# Patient Record
Sex: FEMALE | Race: WHITE | Employment: OTHER | URBAN - METROPOLITAN AREA
[De-identification: names, ages, dates, MRNs, and addresses within clinical notes are randomized per-mention and may not be internally consistent; named-entity substitution may affect disease eponyms.]

---

## 2017-01-25 RX ORDER — PANTOPRAZOLE SODIUM 40 MG/1
TABLET, DELAYED RELEASE ORAL
Qty: 30 TABLET | Refills: 3 | Status: SHIPPED | OUTPATIENT
Start: 2017-01-25 | End: 2017-06-05 | Stop reason: SDUPTHER

## 2017-02-28 ENCOUNTER — OFFICE VISIT (OUTPATIENT)
Dept: FAMILY MEDICINE CLINIC | Age: 82
End: 2017-02-28

## 2017-02-28 VITALS
SYSTOLIC BLOOD PRESSURE: 154 MMHG | HEART RATE: 60 BPM | DIASTOLIC BLOOD PRESSURE: 80 MMHG | WEIGHT: 137 LBS | RESPIRATION RATE: 14 BRPM | HEIGHT: 63 IN | BODY MASS INDEX: 24.27 KG/M2

## 2017-02-28 DIAGNOSIS — R73.9 STEROID-INDUCED HYPERGLYCEMIA: ICD-10-CM

## 2017-02-28 DIAGNOSIS — T38.0X5A STEROID-INDUCED HYPERGLYCEMIA: ICD-10-CM

## 2017-02-28 DIAGNOSIS — K21.9 GASTROESOPHAGEAL REFLUX DISEASE, ESOPHAGITIS PRESENCE NOT SPECIFIED: Primary | ICD-10-CM

## 2017-02-28 DIAGNOSIS — E03.4 HYPOTHYROIDISM DUE TO ACQUIRED ATROPHY OF THYROID: ICD-10-CM

## 2017-02-28 DIAGNOSIS — E78.00 HYPERCHOLESTEREMIA: ICD-10-CM

## 2017-02-28 PROCEDURE — 99213 OFFICE O/P EST LOW 20 MIN: CPT | Performed by: EMERGENCY MEDICINE

## 2017-02-28 RX ORDER — ASPIRIN 81 MG/1
81 TABLET, CHEWABLE ORAL DAILY
Qty: 100 TABLET | Refills: 3 | Status: ON HOLD | OUTPATIENT
Start: 2017-02-28 | End: 2019-06-12 | Stop reason: HOSPADM

## 2017-03-02 RX ORDER — SIMVASTATIN 20 MG
TABLET ORAL
Qty: 90 TABLET | Refills: 0 | Status: SHIPPED | OUTPATIENT
Start: 2017-03-02 | End: 2017-06-12 | Stop reason: SDUPTHER

## 2017-03-02 RX ORDER — LEVOTHYROXINE SODIUM 88 UG/1
TABLET ORAL
Qty: 90 TABLET | Refills: 0 | Status: SHIPPED | OUTPATIENT
Start: 2017-03-02 | End: 2017-06-07 | Stop reason: SDUPTHER

## 2017-03-06 ENCOUNTER — OFFICE VISIT (OUTPATIENT)
Age: 82
End: 2017-03-06

## 2017-03-06 VITALS
OXYGEN SATURATION: 93 % | TEMPERATURE: 97.1 F | SYSTOLIC BLOOD PRESSURE: 120 MMHG | WEIGHT: 138 LBS | BODY MASS INDEX: 24.45 KG/M2 | HEART RATE: 70 BPM | HEIGHT: 63 IN | DIASTOLIC BLOOD PRESSURE: 74 MMHG | RESPIRATION RATE: 18 BRPM

## 2017-03-06 DIAGNOSIS — K43.5 PARASTOMAL HERNIA WITHOUT OBSTRUCTION OR GANGRENE: ICD-10-CM

## 2017-03-06 DIAGNOSIS — C78.7 BREAST CANCER METASTASIZED TO LIVER, LEFT (HCC): Primary | ICD-10-CM

## 2017-03-06 DIAGNOSIS — C50.912 BREAST CANCER METASTASIZED TO LIVER, LEFT (HCC): Primary | ICD-10-CM

## 2017-03-06 PROCEDURE — 99214 OFFICE O/P EST MOD 30 MIN: CPT | Performed by: SURGERY

## 2017-03-06 PROCEDURE — G8598 ASA/ANTIPLAT THER USED: HCPCS | Performed by: SURGERY

## 2017-03-06 PROCEDURE — 4040F PNEUMOC VAC/ADMIN/RCVD: CPT | Performed by: SURGERY

## 2017-03-06 PROCEDURE — 1090F PRES/ABSN URINE INCON ASSESS: CPT | Performed by: SURGERY

## 2017-03-06 PROCEDURE — G8420 CALC BMI NORM PARAMETERS: HCPCS | Performed by: SURGERY

## 2017-03-06 PROCEDURE — G8427 DOCREV CUR MEDS BY ELIG CLIN: HCPCS | Performed by: SURGERY

## 2017-03-06 PROCEDURE — G8400 PT W/DXA NO RESULTS DOC: HCPCS | Performed by: SURGERY

## 2017-03-06 PROCEDURE — 1123F ACP DISCUSS/DSCN MKR DOCD: CPT | Performed by: SURGERY

## 2017-03-06 PROCEDURE — G8484 FLU IMMUNIZE NO ADMIN: HCPCS | Performed by: SURGERY

## 2017-03-06 PROCEDURE — 4004F PT TOBACCO SCREEN RCVD TLK: CPT | Performed by: SURGERY

## 2017-03-06 RX ORDER — CEPHALEXIN 500 MG/1
500 CAPSULE ORAL 4 TIMES DAILY
COMMUNITY
End: 2017-06-06 | Stop reason: ALTCHOICE

## 2017-03-06 ASSESSMENT — ENCOUNTER SYMPTOMS
SHORTNESS OF BREATH: 0
TROUBLE SWALLOWING: 0
BLOOD IN STOOL: 0
ABDOMINAL PAIN: 0
SORE THROAT: 0
COLOR CHANGE: 0
COUGH: 0
VOICE CHANGE: 0
NAUSEA: 0
VOMITING: 0
WHEEZING: 0

## 2017-03-09 RX ORDER — MIDODRINE HYDROCHLORIDE 2.5 MG/1
2.5 TABLET ORAL 3 TIMES DAILY
Qty: 90 TABLET | Refills: 2 | Status: SHIPPED | OUTPATIENT
Start: 2017-03-09 | End: 2017-07-12 | Stop reason: SDUPTHER

## 2017-03-24 ENCOUNTER — TELEPHONE (OUTPATIENT)
Dept: FAMILY MEDICINE CLINIC | Age: 82
End: 2017-03-24

## 2017-05-01 RX ORDER — CLOPIDOGREL BISULFATE 75 MG/1
75 TABLET ORAL DAILY
Qty: 30 TABLET | Refills: 3 | Status: SHIPPED | OUTPATIENT
Start: 2017-05-01 | End: 2017-08-21 | Stop reason: SDUPTHER

## 2017-05-19 ASSESSMENT — ENCOUNTER SYMPTOMS
COUGH: 0
CONSTIPATION: 0
SORE THROAT: 0
SINUS PRESSURE: 0
NAUSEA: 0
RHINORRHEA: 0
DIARRHEA: 0
CHEST TIGHTNESS: 0
ABDOMINAL PAIN: 0
TROUBLE SWALLOWING: 0
VOICE CHANGE: 0
BACK PAIN: 0
WHEEZING: 0
VOMITING: 0
SHORTNESS OF BREATH: 0

## 2017-05-23 ENCOUNTER — TELEPHONE (OUTPATIENT)
Dept: FAMILY MEDICINE CLINIC | Age: 82
End: 2017-05-23

## 2017-05-23 DIAGNOSIS — C50.912 MALIGNANT NEOPLASM OF LEFT FEMALE BREAST, UNSPECIFIED SITE OF BREAST: Primary | ICD-10-CM

## 2017-06-05 RX ORDER — PANTOPRAZOLE SODIUM 40 MG/1
40 TABLET, DELAYED RELEASE ORAL DAILY
Qty: 30 TABLET | Refills: 2 | Status: SHIPPED | OUTPATIENT
Start: 2017-06-05 | End: 2017-08-21 | Stop reason: SDUPTHER

## 2017-06-06 ENCOUNTER — OFFICE VISIT (OUTPATIENT)
Dept: FAMILY MEDICINE CLINIC | Age: 82
End: 2017-06-06

## 2017-06-06 VITALS
DIASTOLIC BLOOD PRESSURE: 60 MMHG | HEIGHT: 63 IN | BODY MASS INDEX: 25.2 KG/M2 | RESPIRATION RATE: 14 BRPM | HEART RATE: 66 BPM | SYSTOLIC BLOOD PRESSURE: 122 MMHG | WEIGHT: 142.2 LBS

## 2017-06-06 DIAGNOSIS — E55.9 UNSPECIFIED VITAMIN D DEFICIENCY: ICD-10-CM

## 2017-06-06 DIAGNOSIS — Z79.52 LONG TERM (CURRENT) USE OF SYSTEMIC STEROIDS: ICD-10-CM

## 2017-06-06 DIAGNOSIS — Z78.0 POSTMENOPAUSAL: ICD-10-CM

## 2017-06-06 DIAGNOSIS — T38.0X5A STEROID-INDUCED HYPERGLYCEMIA: ICD-10-CM

## 2017-06-06 DIAGNOSIS — E03.4 HYPOTHYROIDISM DUE TO ACQUIRED ATROPHY OF THYROID: Primary | ICD-10-CM

## 2017-06-06 DIAGNOSIS — E78.00 HYPERCHOLESTEREMIA: ICD-10-CM

## 2017-06-06 DIAGNOSIS — R73.9 STEROID-INDUCED HYPERGLYCEMIA: ICD-10-CM

## 2017-06-06 DIAGNOSIS — H54.40 BLIND RIGHT EYE: ICD-10-CM

## 2017-06-06 PROCEDURE — 99214 OFFICE O/P EST MOD 30 MIN: CPT | Performed by: EMERGENCY MEDICINE

## 2017-06-06 ASSESSMENT — ENCOUNTER SYMPTOMS
ABDOMINAL PAIN: 0
COUGH: 0
TROUBLE SWALLOWING: 0
ORTHOPNEA: 0
NAUSEA: 0
SHORTNESS OF BREATH: 0
BACK PAIN: 0
SORE THROAT: 0
CONSTIPATION: 0
SINUS PRESSURE: 0
VOICE CHANGE: 0
WHEEZING: 0
VOMITING: 0
DIARRHEA: 0
RHINORRHEA: 0
CHEST TIGHTNESS: 0

## 2017-06-06 ASSESSMENT — PATIENT HEALTH QUESTIONNAIRE - PHQ9
1. LITTLE INTEREST OR PLEASURE IN DOING THINGS: 0
SUM OF ALL RESPONSES TO PHQ QUESTIONS 1-9: 0
2. FEELING DOWN, DEPRESSED OR HOPELESS: 0
SUM OF ALL RESPONSES TO PHQ9 QUESTIONS 1 & 2: 0

## 2017-06-07 RX ORDER — LEVOTHYROXINE SODIUM 88 UG/1
TABLET ORAL
Qty: 90 TABLET | Refills: 1 | Status: SHIPPED | OUTPATIENT
Start: 2017-06-07 | End: 2017-11-29 | Stop reason: SDUPTHER

## 2017-06-08 ENCOUNTER — OFFICE VISIT (OUTPATIENT)
Age: 82
End: 2017-06-08

## 2017-06-08 VITALS
TEMPERATURE: 97.1 F | BODY MASS INDEX: 24.98 KG/M2 | DIASTOLIC BLOOD PRESSURE: 68 MMHG | HEIGHT: 63 IN | WEIGHT: 141 LBS | OXYGEN SATURATION: 93 % | RESPIRATION RATE: 16 BRPM | HEART RATE: 70 BPM | SYSTOLIC BLOOD PRESSURE: 120 MMHG

## 2017-06-08 DIAGNOSIS — C50.912 BREAST CANCER METASTASIZED TO LIVER, LEFT (HCC): ICD-10-CM

## 2017-06-08 DIAGNOSIS — C78.7 BREAST CANCER METASTASIZED TO LIVER, LEFT (HCC): ICD-10-CM

## 2017-06-08 DIAGNOSIS — K43.5 PARASTOMAL HERNIA WITHOUT OBSTRUCTION OR GANGRENE: Primary | ICD-10-CM

## 2017-06-08 PROCEDURE — 99213 OFFICE O/P EST LOW 20 MIN: CPT | Performed by: SURGERY

## 2017-06-08 PROCEDURE — 4040F PNEUMOC VAC/ADMIN/RCVD: CPT | Performed by: SURGERY

## 2017-06-08 PROCEDURE — G8598 ASA/ANTIPLAT THER USED: HCPCS | Performed by: SURGERY

## 2017-06-08 PROCEDURE — 1123F ACP DISCUSS/DSCN MKR DOCD: CPT | Performed by: SURGERY

## 2017-06-08 PROCEDURE — G8420 CALC BMI NORM PARAMETERS: HCPCS | Performed by: SURGERY

## 2017-06-08 PROCEDURE — 1090F PRES/ABSN URINE INCON ASSESS: CPT | Performed by: SURGERY

## 2017-06-08 PROCEDURE — G8427 DOCREV CUR MEDS BY ELIG CLIN: HCPCS | Performed by: SURGERY

## 2017-06-08 PROCEDURE — 4004F PT TOBACCO SCREEN RCVD TLK: CPT | Performed by: SURGERY

## 2017-06-08 PROCEDURE — G8400 PT W/DXA NO RESULTS DOC: HCPCS | Performed by: SURGERY

## 2017-06-08 ASSESSMENT — ENCOUNTER SYMPTOMS
SHORTNESS OF BREATH: 0
VOICE CHANGE: 0
BLOOD IN STOOL: 0
COUGH: 0
TROUBLE SWALLOWING: 0
NAUSEA: 0
WHEEZING: 0
SORE THROAT: 0
COLOR CHANGE: 0
ABDOMINAL PAIN: 0
VOMITING: 0

## 2017-06-09 LAB
CHOLESTEROL, TOTAL: NORMAL MG/DL
CHOLESTEROL/HDL RATIO: NORMAL
HDLC SERPL-MCNC: NORMAL MG/DL (ref 35–70)
LDL CHOLESTEROL CALCULATED: 103 MG/DL (ref 0–160)
TRIGL SERPL-MCNC: NORMAL MG/DL
VLDLC SERPL CALC-MCNC: NORMAL MG/DL

## 2017-06-12 RX ORDER — SIMVASTATIN 20 MG
TABLET ORAL
Qty: 90 TABLET | Refills: 0 | Status: SHIPPED | OUTPATIENT
Start: 2017-06-12 | End: 2017-08-21 | Stop reason: SDUPTHER

## 2017-06-29 DIAGNOSIS — R89.9 ABNORMAL LABORATORY TEST: Primary | ICD-10-CM

## 2017-07-06 ENCOUNTER — NURSE ONLY (OUTPATIENT)
Dept: FAMILY MEDICINE CLINIC | Age: 82
End: 2017-07-06

## 2017-07-06 DIAGNOSIS — R35.0 URINARY FREQUENCY: Primary | ICD-10-CM

## 2017-07-06 DIAGNOSIS — R30.0 BURNING WITH URINATION: ICD-10-CM

## 2017-07-06 LAB
BACTERIA URINE, POC: ABNORMAL
BILIRUBIN URINE: 0 MG/DL
BLOOD, URINE: POSITIVE
CASTS URINE, POC: ABNORMAL
CLARITY: CLEAR
COLOR: YELLOW
CRYSTALS URINE, POC: ABNORMAL
EPI CELLS URINE, POC: ABNORMAL
GLUCOSE URINE: NEGATIVE
KETONES, URINE: NEGATIVE
LEUKOCYTE EST, POC: 15
NITRITE, URINE: NEGATIVE
PH UA: 5 (ref 4.5–8)
PROTEIN UA: POSITIVE
RBC URINE, POC: ABNORMAL
SPECIFIC GRAVITY UA: 1.01 (ref 1–1.03)
UROBILINOGEN, URINE: NORMAL
WBC URINE, POC: ABNORMAL
YEAST URINE, POC: ABNORMAL

## 2017-07-06 PROCEDURE — 81000 URINALYSIS NONAUTO W/SCOPE: CPT | Performed by: EMERGENCY MEDICINE

## 2017-07-07 DIAGNOSIS — R89.9 ABNORMAL LABORATORY TEST: ICD-10-CM

## 2017-07-07 LAB
APPEARANCE: CLEAR
BILIRUBIN: NEGATIVE
COLOR: YELLOW
GLUCOSE BLD-MCNC: NEGATIVE MG/DL
KETONES, URINE: NEGATIVE
LEUKOCYTE ESTERASE, URINE: NEGATIVE
NITRITE, URINE: NEGATIVE
OCCULT BLOOD,URINE: ABNORMAL
PH: 6 (ref 5–9)
PROTEIN, URINE: NEGATIVE
SP GRAVITY MISCELLANEOUS: 1.01 (ref 1–1.03)
UROBILINOGEN, URINE: NORMAL

## 2017-07-10 ENCOUNTER — TELEPHONE (OUTPATIENT)
Dept: FAMILY MEDICINE CLINIC | Age: 82
End: 2017-07-10

## 2017-07-10 DIAGNOSIS — N39.0 URINARY TRACT INFECTION, SITE UNSPECIFIED: Primary | ICD-10-CM

## 2017-07-10 RX ORDER — CIPROFLOXACIN 500 MG/1
500 TABLET, FILM COATED ORAL 2 TIMES DAILY
Qty: 14 TABLET | Refills: 0 | Status: SHIPPED | OUTPATIENT
Start: 2017-07-10 | End: 2017-07-17

## 2017-07-10 RX ORDER — ALPRAZOLAM 0.5 MG/1
0.5 TABLET ORAL 3 TIMES DAILY PRN
Qty: 45 TABLET | Refills: 0 | Status: SHIPPED | OUTPATIENT
Start: 2017-07-10 | End: 2017-08-31 | Stop reason: SDUPTHER

## 2017-07-12 RX ORDER — MIDODRINE HYDROCHLORIDE 2.5 MG/1
TABLET ORAL
Qty: 90 TABLET | Refills: 2 | Status: SHIPPED | OUTPATIENT
Start: 2017-07-12 | End: 2017-11-15 | Stop reason: SDUPTHER

## 2017-07-14 RX ORDER — ISOSORBIDE MONONITRATE 30 MG/1
30 TABLET, EXTENDED RELEASE ORAL DAILY
Qty: 45 TABLET | Refills: 3 | Status: SHIPPED | OUTPATIENT
Start: 2017-07-14 | End: 2018-02-28 | Stop reason: SDUPTHER

## 2017-07-19 ENCOUNTER — HOSPITAL ENCOUNTER (OUTPATIENT)
Dept: WOMENS IMAGING | Age: 82
Discharge: HOME | End: 2017-07-19
Payer: MEDICARE

## 2017-07-19 ENCOUNTER — HOSPITAL ENCOUNTER (OUTPATIENT)
Dept: CT IMAGING | Age: 82
Discharge: HOME | End: 2017-07-19
Payer: MEDICARE

## 2017-07-19 DIAGNOSIS — R51.9 NONINTRACTABLE HEADACHE, UNSPECIFIED CHRONICITY PATTERN, UNSPECIFIED HEADACHE TYPE: ICD-10-CM

## 2017-07-19 DIAGNOSIS — Z79.52 LONG TERM (CURRENT) USE OF SYSTEMIC STEROIDS: ICD-10-CM

## 2017-07-19 DIAGNOSIS — E03.4 HYPOTHYROIDISM DUE TO ACQUIRED ATROPHY OF THYROID: ICD-10-CM

## 2017-07-19 DIAGNOSIS — C50.919 MALIGNANT NEOPLASM OF FEMALE BREAST, UNSPECIFIED LATERALITY, UNSPECIFIED SITE OF BREAST: ICD-10-CM

## 2017-07-19 PROCEDURE — 6360000004 HC RX CONTRAST MEDICATION: Performed by: INTERNAL MEDICINE

## 2017-07-19 PROCEDURE — 70470 CT HEAD/BRAIN W/O & W/DYE: CPT

## 2017-07-19 PROCEDURE — 77080 DXA BONE DENSITY AXIAL: CPT

## 2017-07-19 RX ADMIN — IOPAMIDOL 65 ML: 755 INJECTION, SOLUTION INTRAVENOUS at 15:27

## 2017-07-27 ENCOUNTER — TELEPHONE (OUTPATIENT)
Dept: FAMILY MEDICINE CLINIC | Age: 82
End: 2017-07-27

## 2017-07-27 DIAGNOSIS — M85.89 OSTEOPENIA OF MULTIPLE SITES: Primary | ICD-10-CM

## 2017-07-31 RX ORDER — MULTIVIT-MIN/IRON/FOLIC ACID/K 18-600-40
CAPSULE ORAL
Qty: 60 TABLET | Refills: 3 | Status: SHIPPED | OUTPATIENT
Start: 2017-07-31 | End: 2018-02-07 | Stop reason: SDUPTHER

## 2017-08-11 ENCOUNTER — TELEPHONE (OUTPATIENT)
Dept: FAMILY MEDICINE CLINIC | Age: 82
End: 2017-08-11

## 2017-08-20 ENCOUNTER — APPOINTMENT (OUTPATIENT)
Dept: CT IMAGING | Age: 82
End: 2017-08-20
Payer: MEDICARE

## 2017-08-20 ENCOUNTER — APPOINTMENT (OUTPATIENT)
Dept: GENERAL RADIOLOGY | Age: 82
End: 2017-08-20
Payer: MEDICARE

## 2017-08-20 ENCOUNTER — HOSPITAL ENCOUNTER (EMERGENCY)
Age: 82
Discharge: HOME | End: 2017-08-20
Attending: EMERGENCY MEDICINE
Payer: MEDICARE

## 2017-08-20 VITALS
RESPIRATION RATE: 19 BRPM | SYSTOLIC BLOOD PRESSURE: 149 MMHG | DIASTOLIC BLOOD PRESSURE: 59 MMHG | TEMPERATURE: 98.8 F | HEART RATE: 64 BPM | OXYGEN SATURATION: 95 %
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
## 2017-08-20 DIAGNOSIS — S00.83XA FACIAL HEMATOMA, INITIAL ENCOUNTER: ICD-10-CM

## 2017-08-20 PROCEDURE — 93005 ELECTROCARDIOGRAM TRACING: CPT

## 2017-08-20 PROCEDURE — 99284 EMERGENCY DEPT VISIT MOD MDM: CPT

## 2017-08-20 PROCEDURE — 73070 X-RAY EXAM OF ELBOW: CPT

## 2017-08-20 PROCEDURE — 72125 CT NECK SPINE W/O DYE: CPT

## 2017-08-20 PROCEDURE — 70486 CT MAXILLOFACIAL W/O DYE: CPT

## 2017-08-20 PROCEDURE — 70450 CT HEAD/BRAIN W/O DYE: CPT

## 2017-08-20 ASSESSMENT — PAIN DESCRIPTION - ONSET: ONSET: ON-GOING

## 2017-08-20 ASSESSMENT — PAIN DESCRIPTION - LOCATION: LOCATION: HEAD

## 2017-08-20 ASSESSMENT — PAIN SCALES - GENERAL: PAINLEVEL_OUTOF10: 8

## 2017-08-20 ASSESSMENT — PAIN DESCRIPTION - PAIN TYPE: TYPE: ACUTE PAIN

## 2017-08-20 ASSESSMENT — PAIN DESCRIPTION - ORIENTATION: ORIENTATION: LEFT

## 2017-08-20 ASSESSMENT — PAIN DESCRIPTION - FREQUENCY: FREQUENCY: CONTINUOUS

## 2017-08-20 ASSESSMENT — PAIN DESCRIPTION - DESCRIPTORS: DESCRIPTORS: ACHING

## 2017-08-21 ENCOUNTER — TELEPHONE (OUTPATIENT)
Dept: FAMILY MEDICINE CLINIC | Age: 82
End: 2017-08-21

## 2017-08-21 LAB
EKG ATRIAL RATE: 71 BPM
EKG P AXIS: 32 DEGREES
EKG P-R INTERVAL: 148 MS
EKG Q-T INTERVAL: 390 MS
EKG QRS DURATION: 84 MS
EKG QTC CALCULATION (BAZETT): 423 MS
EKG R AXIS: -11 DEGREES
EKG T AXIS: 104 DEGREES
EKG VENTRICULAR RATE: 71 BPM

## 2017-08-21 RX ORDER — SIMVASTATIN 20 MG
TABLET ORAL
Qty: 90 TABLET | Refills: 1 | Status: SHIPPED | OUTPATIENT
Start: 2017-08-21 | End: 2018-03-09 | Stop reason: SDUPTHER

## 2017-08-21 RX ORDER — PANTOPRAZOLE SODIUM 40 MG/1
TABLET, DELAYED RELEASE ORAL
Qty: 30 TABLET | Refills: 1 | Status: SHIPPED | OUTPATIENT
Start: 2017-08-21 | End: 2017-10-25 | Stop reason: SDUPTHER

## 2017-08-21 RX ORDER — CLOPIDOGREL BISULFATE 75 MG/1
TABLET ORAL
Qty: 30 TABLET | Refills: 1 | Status: SHIPPED | OUTPATIENT
Start: 2017-08-21 | End: 2017-10-25 | Stop reason: SDUPTHER

## 2017-08-31 ENCOUNTER — OFFICE VISIT (OUTPATIENT)
Dept: FAMILY MEDICINE CLINIC | Age: 82
End: 2017-08-31

## 2017-08-31 VITALS
SYSTOLIC BLOOD PRESSURE: 98 MMHG | HEART RATE: 76 BPM | BODY MASS INDEX: 24.54 KG/M2 | RESPIRATION RATE: 14 BRPM | WEIGHT: 138.5 LBS | HEIGHT: 63 IN | DIASTOLIC BLOOD PRESSURE: 60 MMHG
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
## 2017-08-31 DIAGNOSIS — E78.00 HYPERCHOLESTEREMIA: ICD-10-CM

## 2017-08-31 DIAGNOSIS — Z93.3 COLOSTOMY IN PLACE (HCC): ICD-10-CM

## 2017-08-31 DIAGNOSIS — E03.4 HYPOTHYROIDISM DUE TO ACQUIRED ATROPHY OF THYROID: ICD-10-CM

## 2017-08-31 DIAGNOSIS — S00.12XA PERIORBITAL ECCHYMOSIS OF LEFT EYE, INITIAL ENCOUNTER: Primary | ICD-10-CM

## 2017-08-31 DIAGNOSIS — R93.5 ABNORMAL CT OF THE ABDOMEN: ICD-10-CM

## 2017-08-31 PROCEDURE — 99213 OFFICE O/P EST LOW 20 MIN: CPT | Performed by: EMERGENCY MEDICINE

## 2017-08-31 RX ORDER — POTASSIUM CHLORIDE 750 MG/1
1 TABLET, FILM COATED, EXTENDED RELEASE ORAL DAILY
Refills: 0 | Status: ON HOLD | COMMUNITY
Start: 2017-08-21 | End: 2018-09-18 | Stop reason: HOSPADM

## 2017-08-31 RX ORDER — ALPRAZOLAM 0.5 MG/1
0.5 TABLET ORAL 3 TIMES DAILY PRN
Qty: 45 TABLET | Refills: 0 | Status: SHIPPED | OUTPATIENT
Start: 2017-08-31 | End: 2017-12-07 | Stop reason: SDUPTHER

## 2017-08-31 ASSESSMENT — ENCOUNTER SYMPTOMS
VOICE CHANGE: 0
RHINORRHEA: 0
SORE THROAT: 0
ABDOMINAL PAIN: 0
WHEEZING: 0
COUGH: 0
VOMITING: 0
SHORTNESS OF BREATH: 0
CONSTIPATION: 0
DIARRHEA: 0
TROUBLE SWALLOWING: 0
NAUSEA: 0
SINUS PRESSURE: 0
CHEST TIGHTNESS: 0
BACK PAIN: 0

## 2017-09-26 ENCOUNTER — HOSPITAL ENCOUNTER (OUTPATIENT)
Dept: NON INVASIVE DIAGNOSTICS | Age: 82
Discharge: HOME | End: 2017-09-26
Payer: MEDICARE

## 2017-09-26 LAB
LV EF: 65 %
LVEF MODALITY: NORMAL

## 2017-09-26 PROCEDURE — 93306 TTE W/DOPPLER COMPLETE: CPT

## 2017-10-25 NOTE — TELEPHONE ENCOUNTER
Date of last visit:  8/31/2017  Date of next visit:  12/7/2017    Requested Prescriptions     Pending Prescriptions Disp Refills    clopidogrel (PLAVIX) 75 MG tablet [Pharmacy Med Name: CLOPIDOGREL 75 MG TABLET] 30 tablet 1     Sig: take 1 tablet by mouth once daily    pantoprazole (PROTONIX) 40 MG tablet [Pharmacy Med Name: PANTOPRAZOLE SOD DR 40 MG TAB] 30 tablet 1     Sig: take 1 tablet by mouth once daily

## 2017-10-26 RX ORDER — CLOPIDOGREL BISULFATE 75 MG/1
TABLET ORAL
Qty: 30 TABLET | Refills: 5 | Status: SHIPPED | OUTPATIENT
Start: 2017-10-26 | End: 2018-05-09 | Stop reason: SDUPTHER

## 2017-10-26 RX ORDER — PANTOPRAZOLE SODIUM 40 MG/1
TABLET, DELAYED RELEASE ORAL
Qty: 30 TABLET | Refills: 5 | Status: SHIPPED | OUTPATIENT
Start: 2017-10-26 | End: 2018-05-02 | Stop reason: SDUPTHER

## 2017-10-31 ENCOUNTER — OFFICE VISIT (OUTPATIENT)
Dept: FAMILY MEDICINE CLINIC | Age: 82
End: 2017-10-31

## 2017-10-31 VITALS
HEART RATE: 66 BPM | DIASTOLIC BLOOD PRESSURE: 72 MMHG | SYSTOLIC BLOOD PRESSURE: 134 MMHG | BODY MASS INDEX: 25.83 KG/M2 | WEIGHT: 145.8 LBS | HEIGHT: 63 IN | RESPIRATION RATE: 12 BRPM

## 2017-10-31 DIAGNOSIS — T38.0X5A STEROID-INDUCED HYPERGLYCEMIA: ICD-10-CM

## 2017-10-31 DIAGNOSIS — Z01.818 PRE-OPERATIVE CLEARANCE: Primary | ICD-10-CM

## 2017-10-31 DIAGNOSIS — E03.4 HYPOTHYROIDISM DUE TO ACQUIRED ATROPHY OF THYROID: ICD-10-CM

## 2017-10-31 DIAGNOSIS — K21.9 GASTROESOPHAGEAL REFLUX DISEASE, ESOPHAGITIS PRESENCE NOT SPECIFIED: ICD-10-CM

## 2017-10-31 DIAGNOSIS — M31.6 TEMPORAL ARTERITIS (HCC): ICD-10-CM

## 2017-10-31 DIAGNOSIS — Z23 IMMUNIZATION DUE: ICD-10-CM

## 2017-10-31 DIAGNOSIS — R73.9 STEROID-INDUCED HYPERGLYCEMIA: ICD-10-CM

## 2017-10-31 PROCEDURE — G0008 ADMIN INFLUENZA VIRUS VAC: HCPCS | Performed by: EMERGENCY MEDICINE

## 2017-10-31 PROCEDURE — 99214 OFFICE O/P EST MOD 30 MIN: CPT | Performed by: EMERGENCY MEDICINE

## 2017-10-31 RX ORDER — HYDROCODONE BITARTRATE AND ACETAMINOPHEN 5; 325 MG/1; MG/1
TABLET ORAL
Refills: 0 | Status: ON HOLD | COMMUNITY
Start: 2017-09-11 | End: 2018-09-10

## 2017-11-16 RX ORDER — MIDODRINE HYDROCHLORIDE 2.5 MG/1
TABLET ORAL
Qty: 90 TABLET | Refills: 2 | Status: SHIPPED | OUTPATIENT
Start: 2017-11-16 | End: 2017-12-07 | Stop reason: ALTCHOICE

## 2017-11-21 ASSESSMENT — ENCOUNTER SYMPTOMS
TROUBLE SWALLOWING: 0
RHINORRHEA: 0
DIARRHEA: 0
CONSTIPATION: 0
VOICE CHANGE: 0
VOMITING: 0
COUGH: 0
ABDOMINAL PAIN: 0
SINUS PRESSURE: 0
SORE THROAT: 0
SHORTNESS OF BREATH: 0
BACK PAIN: 0
WHEEZING: 0
CHEST TIGHTNESS: 0
NAUSEA: 0

## 2017-11-29 RX ORDER — LEVOTHYROXINE SODIUM 88 UG/1
TABLET ORAL
Qty: 90 TABLET | Refills: 1 | Status: SHIPPED | OUTPATIENT
Start: 2017-11-29 | End: 2018-05-11 | Stop reason: SDUPTHER

## 2017-11-29 NOTE — TELEPHONE ENCOUNTER
Esteban Patten from [facility] called requesting a refill of the below medication which has been pended for you:     Requested Prescriptions     Pending Prescriptions Disp Refills    levothyroxine (SYNTHROID) 88 MCG tablet [Pharmacy Med Name: LEVOTHYROXINE 88 MCG TABLET] 90 tablet 1     Sig: take 1 tablet by mouth once daily       Last Appointment Date: Visit date not found  Next Appointment Date: Visit date not found    Allergies   Allergen Reactions    Bactrim [Sulfamethoxazole-Trimethoprim] Swelling     Of tongue    Demerol Rash     nausea    Pcn [Penicillins] Rash

## 2017-12-07 ENCOUNTER — OFFICE VISIT (OUTPATIENT)
Dept: FAMILY MEDICINE CLINIC | Age: 82
End: 2017-12-07

## 2017-12-07 VITALS
HEART RATE: 60 BPM | BODY MASS INDEX: 25.34 KG/M2 | SYSTOLIC BLOOD PRESSURE: 150 MMHG | DIASTOLIC BLOOD PRESSURE: 80 MMHG | RESPIRATION RATE: 14 BRPM | HEIGHT: 63 IN | WEIGHT: 143 LBS

## 2017-12-07 DIAGNOSIS — E03.4 HYPOTHYROIDISM DUE TO ACQUIRED ATROPHY OF THYROID: Primary | ICD-10-CM

## 2017-12-07 DIAGNOSIS — K21.9 GASTROESOPHAGEAL REFLUX DISEASE WITHOUT ESOPHAGITIS: ICD-10-CM

## 2017-12-07 DIAGNOSIS — E78.00 HYPERCHOLESTEREMIA: ICD-10-CM

## 2017-12-07 PROCEDURE — 99213 OFFICE O/P EST LOW 20 MIN: CPT | Performed by: EMERGENCY MEDICINE

## 2017-12-07 RX ORDER — ALPRAZOLAM 0.5 MG/1
0.5 TABLET ORAL 3 TIMES DAILY PRN
Qty: 45 TABLET | Refills: 0 | Status: SHIPPED | OUTPATIENT
Start: 2017-12-07 | End: 2018-06-12 | Stop reason: SDUPTHER

## 2017-12-07 ASSESSMENT — ENCOUNTER SYMPTOMS
BACK PAIN: 0
RHINORRHEA: 0
CONSTIPATION: 0
SINUS PRESSURE: 0
SHORTNESS OF BREATH: 0
VOMITING: 0
DIARRHEA: 0
TROUBLE SWALLOWING: 0
VOICE CHANGE: 0
COUGH: 0
ABDOMINAL PAIN: 0
NAUSEA: 0
CHEST TIGHTNESS: 0
SORE THROAT: 0
WHEEZING: 0

## 2017-12-07 NOTE — PROGRESS NOTES
Visit Date: 12/7/2017    Subjective:    January Suarez is a 80 y.o. female who presents today for:  Chief Complaint   Patient presents with    Follow-up         HPI:     HPI     Here for a follow-up > she had eye surgery since last visit    She still on chemo from Dr Joseph Zelaya and she will have it for long time      Current Home Medications:  Current Outpatient Prescriptions   Medication Sig Dispense Refill    ALPRAZolam (XANAX) 0.5 MG tablet Take 1 tablet by mouth 3 times daily as needed for Sleep or Anxiety . 45 tablet 0    levothyroxine (SYNTHROID) 88 MCG tablet take 1 tablet by mouth once daily 90 tablet 1    HYDROcodone-acetaminophen (NORCO) 5-325 MG per tablet take 1 tablet by mouth twice a day if needed  0    clopidogrel (PLAVIX) 75 MG tablet take 1 tablet by mouth once daily 30 tablet 5    pantoprazole (PROTONIX) 40 MG tablet take 1 tablet by mouth once daily 30 tablet 5    potassium chloride (KLOR-CON) 10 MEQ extended release tablet Take 1 tablet by mouth daily  0    sertraline (ZOLOFT) 50 MG tablet take 1 tablet by mouth twice a day 180 tablet 0    simvastatin (ZOCOR) 20 MG tablet take 1 tablet by mouth at bedtime 90 tablet 1    Calcium Carb-Cholecalciferol (OYSTER SHELL CALCIUM + D3) 500-400 MG-UNIT TABS One  Tab po bid 60 tablet 3    isosorbide mononitrate (IMDUR) 30 MG extended release tablet Take 1 tablet by mouth daily 45 tablet 3    glucose blood VI test strips (ONETOUCH VERIO) strip Use to check blood sugar once daily.  E11.9 100 each 3    aspirin (RA ASPIRIN ADULT LOW STRENGTH) 81 MG chewable tablet Take 1 tablet by mouth daily 100 tablet 3    ONE TOUCH LANCETS MISC 1 each by Does not apply route daily 100 each 1    albuterol (PROVENTIL) (2.5 MG/3ML) 0.083% nebulizer solution Take 3 mLs by nebulization every 6 hours as needed for Wheezing 30 vial 1    Nebulizers (NEBULIZER COMPRESSOR) MISC Albuterol 1 unit dose every 6 hours for wheezing and chest congestion 1 each 0    predniSONE (DELTASONE) 1 MG tablet Take 1 mg by mouth daily 2 tablets in am      acetaminophen (TYLENOL) 500 MG tablet Take 1,000 mg by mouth as needed for Pain      Multiple Vitamins-Minerals (CENTRUM SILVER ADULT 50+) TABS Take 1 tablet by mouth daily 90 tablet 1    REFRESH TEARS 0.5 % SOLN ophthalmic solution Place 1 drop into both eyes 4 times daily  1    Lift Chair MISC by Does not apply route 1 each 0    timolol (BETIMOL) 0.5 % ophthalmic solution Place 1 drop into the right eye 2 times daily. No current facility-administered medications for this visit. Subjective:      Review of Systems   Constitutional: Negative for appetite change, chills, diaphoresis, fatigue and fever. HENT: Negative for congestion, ear pain, postnasal drip, rhinorrhea, sinus pressure, sneezing, sore throat, trouble swallowing and voice change. Respiratory: Negative for cough, chest tightness, shortness of breath and wheezing. Cardiovascular: Negative for chest pain, palpitations and leg swelling. Gastrointestinal: Negative for abdominal pain, constipation, diarrhea, nausea and vomiting. Musculoskeletal: Negative for arthralgias, back pain, joint swelling, myalgias, neck pain and neck stiffness. Neurological: Negative for dizziness, syncope, weakness, light-headedness, numbness and headaches. Objective:     BP (!) 150/80   Pulse 60   Resp 14   Ht 5' 3\" (1.6 m)   Wt 143 lb (64.9 kg)   BMI 25.33 kg/m²   BP Readings from Last 3 Encounters:   12/07/17 (!) 150/80   10/31/17 134/72   08/31/17 98/60     Wt Readings from Last 3 Encounters:   12/07/17 143 lb (64.9 kg)   10/31/17 145 lb 12.8 oz (66.1 kg)   08/31/17 138 lb 8 oz (62.8 kg)       Physical Exam   Constitutional: She is oriented to person, place, and time. She appears well-developed and well-nourished. She is cooperative. HENT:   Head: Normocephalic and atraumatic.    Right Ear: External ear normal.   Left Ear: External ear normal.   Nose: Nose normal. Mouth/Throat: Oropharynx is clear and moist.   Eyes: Conjunctivae and EOM are normal. Pupils are equal, round, and reactive to light. No scleral icterus. Neck: Normal range of motion. Neck supple. No JVD present. No thyromegaly present. Cardiovascular: Normal rate, regular rhythm and intact distal pulses. Exam reveals no friction rub. No murmur heard. Pulmonary/Chest: Effort normal and breath sounds normal. She has no wheezes. She has no rales. She exhibits no tenderness. Abdominal: Soft. Bowel sounds are normal. She exhibits no mass. There is no tenderness. Musculoskeletal: She exhibits no edema. Lymphadenopathy:     She has no cervical adenopathy. Neurological: She is alert and oriented to person, place, and time. Skin: No rash noted. Vitals reviewed. Assessment:        1. Hypothyroidism due to acquired atrophy of thyroid  TSH without Reflex   2. Gastroesophageal reflux disease without esophagitis     3. Hypercholesteremia         Plan:      Medications Prescribed:  Orders Placed This Encounter   Medications    ALPRAZolam (XANAX) 0.5 MG tablet     Sig: Take 1 tablet by mouth 3 times daily as needed for Sleep or Anxiety . Dispense:  45 tablet     Refill:  0     Orders Placed:  Orders Placed This Encounter   Procedures    TSH without Reflex     Laboratory Test to be done in 3 months     Standing Status:   Future     Standing Expiration Date:   12/7/2018     Advised to stop the Midodrine, wean off self    Return in about 3 months (around 3/7/2018). Discussed use, benefit, and side effects of prescribed medications. All patient questions answered. Pt voiced understanding. Instructed to continue current medications, diet and exercise. Patient agreed with treatment plan.

## 2017-12-14 ENCOUNTER — TELEPHONE (OUTPATIENT)
Dept: FAMILY MEDICINE CLINIC | Age: 82
End: 2017-12-14

## 2017-12-14 RX ORDER — CLINDAMYCIN HYDROCHLORIDE 300 MG/1
300 CAPSULE ORAL 3 TIMES DAILY
Qty: 21 CAPSULE | Refills: 0 | Status: ON HOLD | OUTPATIENT
Start: 2017-12-14 | End: 2018-09-05 | Stop reason: ALTCHOICE

## 2017-12-15 LAB — TSH SERPL DL<=0.05 MIU/L-ACNC: 6.5 MCIU/ML (ref 0.49–4.67)

## 2018-01-24 ENCOUNTER — TELEPHONE (OUTPATIENT)
Dept: FAMILY MEDICINE CLINIC | Age: 83
End: 2018-01-24

## 2018-01-25 RX ORDER — LORATADINE 10 MG/1
10 TABLET ORAL DAILY
Qty: 90 TABLET | Refills: 1 | Status: SHIPPED | OUTPATIENT
Start: 2018-01-25 | End: 2018-03-28 | Stop reason: SDUPTHER

## 2018-02-02 ENCOUNTER — HOSPITAL ENCOUNTER (OUTPATIENT)
Age: 83
Discharge: HOME | End: 2018-02-02
Payer: MEDICARE

## 2018-02-02 ENCOUNTER — HOSPITAL ENCOUNTER (OUTPATIENT)
Dept: NON INVASIVE DIAGNOSTICS | Age: 83
Discharge: HOME | End: 2018-02-02
Payer: MEDICARE

## 2018-02-02 LAB
ANISOCYTOSIS: ABNORMAL
BASOPHILS # BLD: 0.2 %
BASOPHILS ABSOLUTE: 0 THOU/MM3 (ref 0–0.1)
EOSINOPHIL # BLD: 0.4 %
EOSINOPHILS ABSOLUTE: 0.1 THOU/MM3 (ref 0–0.4)
HCT VFR BLD CALC: 37.9 % (ref 37–47)
HEMOGLOBIN: 12.2 GM/DL (ref 12–16)
LV EF: 65 %
LVEF MODALITY: NORMAL
LYMPHOCYTES # BLD: 16 %
LYMPHOCYTES ABSOLUTE: 2.2 THOU/MM3 (ref 1–4.8)
MCH RBC QN AUTO: 27.6 PG (ref 27–31)
MCHC RBC AUTO-ENTMCNC: 32.1 GM/DL (ref 33–37)
MCV RBC AUTO: 85.8 FL (ref 81–99)
MONOCYTES # BLD: 6.4 %
MONOCYTES ABSOLUTE: 0.9 THOU/MM3 (ref 0.4–1.3)
NUCLEATED RED BLOOD CELLS: 0 /100 WBC
PDW BLD-RTO: 16.2 % (ref 11.5–14.5)
PLATELET # BLD: 211 THOU/MM3 (ref 130–400)
PMV BLD AUTO: 10.9 FL (ref 7.4–10.4)
RBC # BLD: 4.41 MILL/MM3 (ref 4.2–5.4)
SEG NEUTROPHILS: 77 %
SEGMENTED NEUTROPHILS ABSOLUTE COUNT: 10.6 THOU/MM3 (ref 1.8–7.7)
WBC # BLD: 13.8 THOU/MM3 (ref 4.8–10.8)

## 2018-02-02 PROCEDURE — 86300 IMMUNOASSAY TUMOR CA 15-3: CPT

## 2018-02-02 PROCEDURE — 36415 COLL VENOUS BLD VENIPUNCTURE: CPT

## 2018-02-02 PROCEDURE — 85025 COMPLETE CBC W/AUTO DIFF WBC: CPT

## 2018-02-02 PROCEDURE — 93306 TTE W/DOPPLER COMPLETE: CPT

## 2018-02-07 LAB — CA 27-29: < 4 U/ML (ref 0–38)

## 2018-02-21 ENCOUNTER — TELEPHONE (OUTPATIENT)
Dept: FAMILY MEDICINE CLINIC | Age: 83
End: 2018-02-21

## 2018-02-21 NOTE — TELEPHONE ENCOUNTER
Emelyn Sethi, [facility] ([phone]) - called requesting an order for a Abdominal Binder for hernia support. Has one now but needs a new one. Can you order?     Please fax order to [facility] at Fax # [phone]

## 2018-03-01 RX ORDER — ISOSORBIDE MONONITRATE 30 MG/1
TABLET, EXTENDED RELEASE ORAL
Qty: 45 TABLET | Refills: 3 | Status: ON HOLD | OUTPATIENT
Start: 2018-03-01 | End: 2018-09-04 | Stop reason: SDUPTHER

## 2018-03-28 NOTE — TELEPHONE ENCOUNTER
Date of last visit:  12/7/2017  Date of next visit:  Visit date not found    Requested Prescriptions     Pending Prescriptions Disp Refills    RA LORATADINE 10 MG tablet [Pharmacy Med Name: RA LORATADINE 10 MG TABLET] 90 tablet 1     Sig: take 1 tablet by mouth once daily

## 2018-03-29 ENCOUNTER — TELEPHONE (OUTPATIENT)
Dept: FAMILY MEDICINE CLINIC | Age: 83
End: 2018-03-29

## 2018-03-29 RX ORDER — ALCOHOL 62 ML/100ML
LIQUID TOPICAL
Qty: 90 TABLET | Refills: 1 | Status: SHIPPED | OUTPATIENT
Start: 2018-03-29 | End: 2018-12-07 | Stop reason: SDUPTHER

## 2018-05-03 RX ORDER — PANTOPRAZOLE SODIUM 40 MG/1
TABLET, DELAYED RELEASE ORAL
Qty: 30 TABLET | Refills: 5 | Status: SHIPPED | OUTPATIENT
Start: 2018-05-03 | End: 2019-07-17 | Stop reason: SDUPTHER

## 2018-05-10 RX ORDER — CLOPIDOGREL BISULFATE 75 MG/1
TABLET ORAL
Qty: 30 TABLET | Refills: 0 | Status: SHIPPED | OUTPATIENT
Start: 2018-05-10 | End: 2018-06-13 | Stop reason: SDUPTHER

## 2018-05-12 RX ORDER — LEVOTHYROXINE SODIUM 88 UG/1
TABLET ORAL
Qty: 90 TABLET | Refills: 0 | Status: SHIPPED | OUTPATIENT
Start: 2018-05-12 | End: 2018-08-30 | Stop reason: SDUPTHER

## 2018-05-16 ENCOUNTER — HOSPITAL ENCOUNTER (OUTPATIENT)
Dept: NON INVASIVE DIAGNOSTICS | Age: 83
Discharge: HOME | End: 2018-05-16
Payer: MEDICARE

## 2018-05-16 LAB
LV EF: 63 %
LVEF MODALITY: NORMAL

## 2018-05-16 PROCEDURE — 93306 TTE W/DOPPLER COMPLETE: CPT

## 2018-05-17 ENCOUNTER — OFFICE VISIT (OUTPATIENT)
Dept: SURGERY | Age: 83
End: 2018-05-17
Payer: MEDICARE

## 2018-05-17 VITALS
SYSTOLIC BLOOD PRESSURE: 110 MMHG | RESPIRATION RATE: 18 BRPM | DIASTOLIC BLOOD PRESSURE: 74 MMHG | TEMPERATURE: 97.4 F | BODY MASS INDEX: 28.16 KG/M2 | WEIGHT: 158.9 LBS | HEIGHT: 63 IN | OXYGEN SATURATION: 93 % | HEART RATE: 78 BPM

## 2018-05-17 DIAGNOSIS — C78.7 BREAST CANCER METASTASIZED TO LIVER, LEFT (HCC): ICD-10-CM

## 2018-05-17 DIAGNOSIS — K43.5 PARASTOMAL HERNIA WITHOUT OBSTRUCTION OR GANGRENE: Primary | ICD-10-CM

## 2018-05-17 DIAGNOSIS — C50.912 BREAST CANCER METASTASIZED TO LIVER, LEFT (HCC): ICD-10-CM

## 2018-05-17 PROCEDURE — 1123F ACP DISCUSS/DSCN MKR DOCD: CPT | Performed by: SURGERY

## 2018-05-17 PROCEDURE — 4040F PNEUMOC VAC/ADMIN/RCVD: CPT | Performed by: SURGERY

## 2018-05-17 PROCEDURE — 4004F PT TOBACCO SCREEN RCVD TLK: CPT | Performed by: SURGERY

## 2018-05-17 PROCEDURE — G8399 PT W/DXA RESULTS DOCUMENT: HCPCS | Performed by: SURGERY

## 2018-05-17 PROCEDURE — 1090F PRES/ABSN URINE INCON ASSESS: CPT | Performed by: SURGERY

## 2018-05-17 PROCEDURE — 99214 OFFICE O/P EST MOD 30 MIN: CPT | Performed by: SURGERY

## 2018-05-17 PROCEDURE — G8427 DOCREV CUR MEDS BY ELIG CLIN: HCPCS | Performed by: SURGERY

## 2018-05-17 PROCEDURE — G8598 ASA/ANTIPLAT THER USED: HCPCS | Performed by: SURGERY

## 2018-05-17 PROCEDURE — G8417 CALC BMI ABV UP PARAM F/U: HCPCS | Performed by: SURGERY

## 2018-05-18 ASSESSMENT — ENCOUNTER SYMPTOMS
BLOOD IN STOOL: 0
WHEEZING: 0
ABDOMINAL PAIN: 1
COLOR CHANGE: 0
SHORTNESS OF BREATH: 0
CONSTIPATION: 0
COUGH: 0
TROUBLE SWALLOWING: 0
SORE THROAT: 0
NAUSEA: 0

## 2018-05-30 ENCOUNTER — HOSPITAL ENCOUNTER (OUTPATIENT)
Dept: CT IMAGING | Age: 83
Discharge: HOME | End: 2018-05-30
Payer: MEDICARE

## 2018-05-30 DIAGNOSIS — K43.5 PARASTOMAL HERNIA WITHOUT OBSTRUCTION OR GANGRENE: ICD-10-CM

## 2018-05-30 LAB — POC CREATININE WHOLE BLOOD: 0.9 MG/DL (ref 0.5–1.2)

## 2018-05-30 PROCEDURE — 82565 ASSAY OF CREATININE: CPT

## 2018-05-30 PROCEDURE — 6360000004 HC RX CONTRAST MEDICATION: Performed by: SURGERY

## 2018-05-30 PROCEDURE — 74177 CT ABD & PELVIS W/CONTRAST: CPT

## 2018-05-30 RX ADMIN — IOHEXOL 50 ML: 240 INJECTION, SOLUTION INTRATHECAL; INTRAVASCULAR; INTRAVENOUS; ORAL at 13:00

## 2018-05-30 RX ADMIN — IOPAMIDOL 85 ML: 755 INJECTION, SOLUTION INTRAVENOUS at 13:01

## 2018-05-31 ENCOUNTER — OFFICE VISIT (OUTPATIENT)
Dept: SURGERY | Age: 83
End: 2018-05-31
Payer: MEDICARE

## 2018-05-31 VITALS
RESPIRATION RATE: 18 BRPM | WEIGHT: 156 LBS | HEART RATE: 83 BPM | SYSTOLIC BLOOD PRESSURE: 112 MMHG | TEMPERATURE: 99.3 F | OXYGEN SATURATION: 92 % | HEIGHT: 63 IN | BODY MASS INDEX: 27.64 KG/M2 | DIASTOLIC BLOOD PRESSURE: 60 MMHG

## 2018-05-31 DIAGNOSIS — K43.5 PARASTOMAL HERNIA WITHOUT OBSTRUCTION OR GANGRENE: Primary | ICD-10-CM

## 2018-05-31 DIAGNOSIS — C50.912 BREAST CANCER METASTASIZED TO LIVER, LEFT (HCC): ICD-10-CM

## 2018-05-31 DIAGNOSIS — C78.7 BREAST CANCER METASTASIZED TO LIVER, LEFT (HCC): ICD-10-CM

## 2018-05-31 DIAGNOSIS — R52 PAIN: ICD-10-CM

## 2018-05-31 PROCEDURE — 4040F PNEUMOC VAC/ADMIN/RCVD: CPT | Performed by: SURGERY

## 2018-05-31 PROCEDURE — 4004F PT TOBACCO SCREEN RCVD TLK: CPT | Performed by: SURGERY

## 2018-05-31 PROCEDURE — G8598 ASA/ANTIPLAT THER USED: HCPCS | Performed by: SURGERY

## 2018-05-31 PROCEDURE — 1123F ACP DISCUSS/DSCN MKR DOCD: CPT | Performed by: SURGERY

## 2018-05-31 PROCEDURE — G8399 PT W/DXA RESULTS DOCUMENT: HCPCS | Performed by: SURGERY

## 2018-05-31 PROCEDURE — G8427 DOCREV CUR MEDS BY ELIG CLIN: HCPCS | Performed by: SURGERY

## 2018-05-31 PROCEDURE — 1090F PRES/ABSN URINE INCON ASSESS: CPT | Performed by: SURGERY

## 2018-05-31 PROCEDURE — G8417 CALC BMI ABV UP PARAM F/U: HCPCS | Performed by: SURGERY

## 2018-05-31 PROCEDURE — 99213 OFFICE O/P EST LOW 20 MIN: CPT | Performed by: SURGERY

## 2018-06-01 ENCOUNTER — APPOINTMENT (OUTPATIENT)
Dept: GENERAL RADIOLOGY | Age: 83
End: 2018-06-01
Payer: MEDICARE

## 2018-06-01 ENCOUNTER — TELEPHONE (OUTPATIENT)
Dept: FAMILY MEDICINE CLINIC | Age: 83
End: 2018-06-01

## 2018-06-01 ENCOUNTER — NURSE TRIAGE (OUTPATIENT)
Dept: ADMINISTRATIVE | Age: 83
End: 2018-06-01

## 2018-06-01 ENCOUNTER — HOSPITAL ENCOUNTER (EMERGENCY)
Age: 83
Discharge: HOME | End: 2018-06-02
Payer: MEDICARE

## 2018-06-01 DIAGNOSIS — R11.2 NON-INTRACTABLE VOMITING WITH NAUSEA, UNSPECIFIED VOMITING TYPE: Primary | ICD-10-CM

## 2018-06-01 DIAGNOSIS — R19.7 DIARRHEA, UNSPECIFIED TYPE: ICD-10-CM

## 2018-06-01 LAB
ALBUMIN SERPL-MCNC: 4.2 G/DL (ref 3.5–5.1)
ALP BLD-CCNC: 45 U/L (ref 38–126)
ALT SERPL-CCNC: 18 U/L (ref 11–66)
ANION GAP SERPL CALCULATED.3IONS-SCNC: 11 MEQ/L (ref 8–16)
ANISOCYTOSIS: ABNORMAL
AST SERPL-CCNC: 22 U/L (ref 5–40)
BASOPHILS # BLD: 0.1 %
BASOPHILS ABSOLUTE: 0 THOU/MM3 (ref 0–0.1)
BILIRUB SERPL-MCNC: 0.4 MG/DL (ref 0.3–1.2)
BILIRUBIN DIRECT: < 0.2 MG/DL (ref 0–0.3)
BUN BLDV-MCNC: 27 MG/DL (ref 7–22)
CALCIUM SERPL-MCNC: 9.2 MG/DL (ref 8.5–10.5)
CHLORIDE BLD-SCNC: 104 MEQ/L (ref 98–111)
CO2: 27 MEQ/L (ref 23–33)
CREAT SERPL-MCNC: 0.9 MG/DL (ref 0.4–1.2)
EKG ATRIAL RATE: 80 BPM
EKG P AXIS: 68 DEGREES
EKG P-R INTERVAL: 162 MS
EKG Q-T INTERVAL: 362 MS
EKG QRS DURATION: 78 MS
EKG QTC CALCULATION (BAZETT): 417 MS
EKG R AXIS: -16 DEGREES
EKG T AXIS: 111 DEGREES
EKG VENTRICULAR RATE: 80 BPM
EOSINOPHIL # BLD: 0.3 %
EOSINOPHILS ABSOLUTE: 0 THOU/MM3 (ref 0–0.4)
GFR SERPL CREATININE-BSD FRML MDRD: 60 ML/MIN/1.73M2
GLUCOSE BLD-MCNC: 141 MG/DL (ref 70–108)
HCT VFR BLD CALC: 38.3 % (ref 37–47)
HEMOGLOBIN: 12.3 GM/DL (ref 12–16)
HYPOCHROMIA: ABNORMAL
LACTIC ACID: 0.9 MMOL/L (ref 0.5–2.2)
LIPASE: 33.9 U/L (ref 5.6–51.3)
LYMPHOCYTES # BLD: 8.1 %
LYMPHOCYTES ABSOLUTE: 1 THOU/MM3 (ref 1–4.8)
MAGNESIUM: 2 MG/DL (ref 1.6–2.4)
MCH RBC QN AUTO: 26.4 PG (ref 27–31)
MCHC RBC AUTO-ENTMCNC: 32.2 GM/DL (ref 33–37)
MCV RBC AUTO: 82.2 FL (ref 81–99)
MONOCYTES # BLD: 8.6 %
MONOCYTES ABSOLUTE: 1 THOU/MM3 (ref 0.4–1.3)
NUCLEATED RED BLOOD CELLS: 0 /100 WBC
OSMOLALITY CALCULATION: 290.6 MOSMOL/KG (ref 275–300)
PDW BLD-RTO: 17.8 % (ref 11.5–14.5)
PLATELET # BLD: 205 THOU/MM3 (ref 130–400)
PMV BLD AUTO: 9.9 FL (ref 7.4–10.4)
POTASSIUM SERPL-SCNC: 4.4 MEQ/L (ref 3.5–5.2)
RBC # BLD: 4.66 MILL/MM3 (ref 4.2–5.4)
SEG NEUTROPHILS: 82.9 %
SEGMENTED NEUTROPHILS ABSOLUTE COUNT: 10 THOU/MM3 (ref 1.8–7.7)
SODIUM BLD-SCNC: 142 MEQ/L (ref 135–145)
TOTAL PROTEIN: 6.7 G/DL (ref 6.1–8)
WBC # BLD: 12.1 THOU/MM3 (ref 4.8–10.8)

## 2018-06-01 PROCEDURE — 36415 COLL VENOUS BLD VENIPUNCTURE: CPT

## 2018-06-01 PROCEDURE — 6360000002 HC RX W HCPCS: Performed by: NURSE PRACTITIONER

## 2018-06-01 PROCEDURE — 85025 COMPLETE CBC W/AUTO DIFF WBC: CPT

## 2018-06-01 PROCEDURE — 82248 BILIRUBIN DIRECT: CPT

## 2018-06-01 PROCEDURE — 80053 COMPREHEN METABOLIC PANEL: CPT

## 2018-06-01 PROCEDURE — 83690 ASSAY OF LIPASE: CPT

## 2018-06-01 PROCEDURE — 74018 RADEX ABDOMEN 1 VIEW: CPT

## 2018-06-01 PROCEDURE — 99285 EMERGENCY DEPT VISIT HI MDM: CPT

## 2018-06-01 PROCEDURE — 83735 ASSAY OF MAGNESIUM: CPT

## 2018-06-01 PROCEDURE — 96374 THER/PROPH/DIAG INJ IV PUSH: CPT

## 2018-06-01 PROCEDURE — 83605 ASSAY OF LACTIC ACID: CPT

## 2018-06-01 PROCEDURE — 93005 ELECTROCARDIOGRAM TRACING: CPT | Performed by: NURSE PRACTITIONER

## 2018-06-01 RX ORDER — 0.9 % SODIUM CHLORIDE 0.9 %
500 INTRAVENOUS SOLUTION INTRAVENOUS ONCE
Status: COMPLETED | OUTPATIENT
Start: 2018-06-01 | End: 2018-06-02

## 2018-06-01 RX ORDER — ONDANSETRON 2 MG/ML
4 INJECTION INTRAMUSCULAR; INTRAVENOUS ONCE
Status: COMPLETED | OUTPATIENT
Start: 2018-06-01 | End: 2018-06-01

## 2018-06-01 RX ADMIN — ONDANSETRON 4 MG: 2 INJECTION INTRAMUSCULAR; INTRAVENOUS at 23:07

## 2018-06-01 ASSESSMENT — ENCOUNTER SYMPTOMS
SORE THROAT: 0
COUGH: 0
NAUSEA: 0
TROUBLE SWALLOWING: 0
ABDOMINAL PAIN: 1
CONSTIPATION: 0
SHORTNESS OF BREATH: 0
WHEEZING: 0
COLOR CHANGE: 0
BLOOD IN STOOL: 0

## 2018-06-02 ENCOUNTER — APPOINTMENT (OUTPATIENT)
Dept: CT IMAGING | Age: 83
End: 2018-06-02
Payer: MEDICARE

## 2018-06-02 VITALS
RESPIRATION RATE: 19 BRPM | TEMPERATURE: 97.7 F | WEIGHT: 158 LBS | BODY MASS INDEX: 28 KG/M2 | HEART RATE: 76 BPM | SYSTOLIC BLOOD PRESSURE: 138 MMHG | DIASTOLIC BLOOD PRESSURE: 61 MMHG | HEIGHT: 63 IN | OXYGEN SATURATION: 94 %

## 2018-06-02 LAB
ADENOVIRUS F 40 41 PCR: NOT DETECTED
ASTROVIRUS PCR: NOT DETECTED
BACTERIA: ABNORMAL
BILIRUBIN URINE: NEGATIVE
BLOOD, URINE: ABNORMAL
CAMPYLOBACTER PCR: NOT DETECTED
CASTS: ABNORMAL /LPF
CASTS: ABNORMAL /LPF
CHARACTER, URINE: CLEAR
CLOSTRIDIUM DIFFICILE, PCR: NOT DETECTED
COLOR: YELLOW
CRYPTOSPORIDIUM PCR: NOT DETECTED
CRYSTALS: ABNORMAL
CYCLOSPORA CAYETANENSIS PCR: NOT DETECTED
E COLI 0157 PCR: NORMAL
E COLI ENTEROAGGREGATIVE PCR: NOT DETECTED
E COLI ENTEROPATHOGENIC PCR: NOT DETECTED
E COLI ENTEROTOXIGENIC PCR: NOT DETECTED
E COLI SHIGA LIKE TOXIN PCR: NOT DETECTED
E COLI SHIGELLA/ENTEROINVASIVE PCR: NOT DETECTED
E HISTOLYTICA GI FILM ARRAY: NOT DETECTED
EPITHELIAL CELLS, UA: ABNORMAL /HPF
GIARDIA LAMBLIA PCR: NOT DETECTED
GLUCOSE, URINE: NEGATIVE MG/DL
KETONES, URINE: ABNORMAL
LEUKOCYTE ESTERASE, URINE: NEGATIVE
MISCELLANEOUS LAB TEST RESULT: ABNORMAL
NITRITE, URINE: NEGATIVE
NOROVIRUS GI GII PCR: NOT DETECTED
PH UA: 5
PLESIOMONAS SHIGELLOIDES PCR: NOT DETECTED
PROTEIN UA: ABNORMAL MG/DL
RBC URINE: ABNORMAL /HPF
RENAL EPITHELIAL, UA: ABNORMAL
ROTAVIRUS A PCR: NOT DETECTED
SALMONELLA PCR: NOT DETECTED
SAPOVIRUS PCR: NOT DETECTED
SPECIFIC GRAVITY UA: 1.03 (ref 1–1.03)
UROBILINOGEN, URINE: 0.2 EU/DL
VIBRIO CHOLERAE PCR: NOT DETECTED
VIBRIO PCR: NOT DETECTED
WBC UA: ABNORMAL /HPF
YEAST: ABNORMAL
YERSINIA ENTEROCOLITICA PCR: NOT DETECTED

## 2018-06-02 PROCEDURE — 74177 CT ABD & PELVIS W/CONTRAST: CPT

## 2018-06-02 PROCEDURE — 96375 TX/PRO/DX INJ NEW DRUG ADDON: CPT

## 2018-06-02 PROCEDURE — 87507 IADNA-DNA/RNA PROBE TQ 12-25: CPT

## 2018-06-02 PROCEDURE — 81001 URINALYSIS AUTO W/SCOPE: CPT

## 2018-06-02 PROCEDURE — 6360000002 HC RX W HCPCS: Performed by: NURSE PRACTITIONER

## 2018-06-02 PROCEDURE — 2580000003 HC RX 258: Performed by: NURSE PRACTITIONER

## 2018-06-02 PROCEDURE — 6360000004 HC RX CONTRAST MEDICATION: Performed by: NURSE PRACTITIONER

## 2018-06-02 RX ORDER — ONDANSETRON 4 MG/1
4 TABLET, ORALLY DISINTEGRATING ORAL EVERY 8 HOURS PRN
Qty: 20 TABLET | Refills: 0 | Status: SHIPPED | OUTPATIENT
Start: 2018-06-02 | End: 2018-09-27 | Stop reason: SDUPTHER

## 2018-06-02 RX ORDER — ONDANSETRON 4 MG/1
4 TABLET, ORALLY DISINTEGRATING ORAL ONCE
Status: DISCONTINUED | OUTPATIENT
Start: 2018-06-02 | End: 2018-06-02 | Stop reason: HOSPADM

## 2018-06-02 RX ADMIN — PROCHLORPERAZINE EDISYLATE 10 MG: 5 INJECTION INTRAMUSCULAR; INTRAVENOUS at 00:43

## 2018-06-02 RX ADMIN — SODIUM CHLORIDE 500 ML: 9 INJECTION, SOLUTION INTRAVENOUS at 00:50

## 2018-06-02 RX ADMIN — IOPAMIDOL 80 ML: 755 INJECTION, SOLUTION INTRAVENOUS at 02:18

## 2018-06-02 ASSESSMENT — ENCOUNTER SYMPTOMS
WHEEZING: 0
VOMITING: 1
NAUSEA: 1
RHINORRHEA: 0
ABDOMINAL PAIN: 1
EYE PAIN: 0
SHORTNESS OF BREATH: 0
DIARRHEA: 0
BACK PAIN: 0
EYE DISCHARGE: 0
SORE THROAT: 0
COUGH: 0

## 2018-06-12 DIAGNOSIS — F41.9 ANXIETY: Primary | ICD-10-CM

## 2018-06-12 DIAGNOSIS — G47.9 SLEEP DIFFICULTIES: ICD-10-CM

## 2018-06-12 RX ORDER — ALPRAZOLAM 0.5 MG/1
0.5 TABLET ORAL 3 TIMES DAILY PRN
Qty: 45 TABLET | Refills: 0 | Status: SHIPPED | OUTPATIENT
Start: 2018-06-12 | End: 2018-08-30 | Stop reason: SDUPTHER

## 2018-06-14 RX ORDER — SIMVASTATIN 20 MG
TABLET ORAL
Qty: 90 TABLET | Refills: 1 | Status: SHIPPED | OUTPATIENT
Start: 2018-06-14 | End: 2018-09-27

## 2018-06-14 RX ORDER — CLOPIDOGREL BISULFATE 75 MG/1
TABLET ORAL
Qty: 30 TABLET | Refills: 5 | Status: SHIPPED | OUTPATIENT
Start: 2018-06-14 | End: 2018-12-14 | Stop reason: SDUPTHER

## 2018-06-22 ENCOUNTER — TELEPHONE (OUTPATIENT)
Dept: FAMILY MEDICINE CLINIC | Age: 83
End: 2018-06-22

## 2018-06-22 RX ORDER — AZITHROMYCIN 250 MG/1
TABLET, FILM COATED ORAL
Qty: 1 PACKET | Refills: 0 | Status: SHIPPED | OUTPATIENT
Start: 2018-06-22 | End: 2018-07-02

## 2018-07-25 ENCOUNTER — TELEPHONE (OUTPATIENT)
Dept: FAMILY MEDICINE CLINIC | Age: 83
End: 2018-07-25

## 2018-07-26 ENCOUNTER — OFFICE VISIT (OUTPATIENT)
Dept: FAMILY MEDICINE CLINIC | Age: 83
End: 2018-07-26

## 2018-07-26 VITALS
HEIGHT: 63 IN | SYSTOLIC BLOOD PRESSURE: 112 MMHG | BODY MASS INDEX: 27.29 KG/M2 | WEIGHT: 154 LBS | RESPIRATION RATE: 13 BRPM | DIASTOLIC BLOOD PRESSURE: 60 MMHG | HEART RATE: 68 BPM

## 2018-07-26 DIAGNOSIS — S51.812A SKIN TEAR OF LEFT FOREARM WITHOUT COMPLICATION, INITIAL ENCOUNTER: ICD-10-CM

## 2018-07-26 DIAGNOSIS — K43.9 ABDOMINAL WALL HERNIA: ICD-10-CM

## 2018-07-26 DIAGNOSIS — Z01.818 PRE-OPERATIVE CLEARANCE: Primary | ICD-10-CM

## 2018-07-26 DIAGNOSIS — E03.4 HYPOTHYROIDISM DUE TO ACQUIRED ATROPHY OF THYROID: ICD-10-CM

## 2018-07-26 DIAGNOSIS — K43.5 PARASTOMAL HERNIA WITHOUT OBSTRUCTION OR GANGRENE: ICD-10-CM

## 2018-07-26 PROCEDURE — 99214 OFFICE O/P EST MOD 30 MIN: CPT | Performed by: EMERGENCY MEDICINE

## 2018-07-26 PROCEDURE — 1101F PT FALLS ASSESS-DOCD LE1/YR: CPT | Performed by: EMERGENCY MEDICINE

## 2018-07-26 RX ORDER — CEPHALEXIN 500 MG/1
500 CAPSULE ORAL 3 TIMES DAILY
Qty: 30 CAPSULE | Refills: 0 | Status: ON HOLD | OUTPATIENT
Start: 2018-07-26 | End: 2018-09-05 | Stop reason: ALTCHOICE

## 2018-07-26 ASSESSMENT — ENCOUNTER SYMPTOMS
VOICE CHANGE: 0
WHEEZING: 0
TROUBLE SWALLOWING: 0
ROS SKIN COMMENTS: SKIN TEAR LEFT FOREARM
DIARRHEA: 0
SHORTNESS OF BREATH: 0
ABDOMINAL PAIN: 0
SORE THROAT: 0
CHEST TIGHTNESS: 0
COUGH: 0
NAUSEA: 0
SINUS PRESSURE: 0
CONSTIPATION: 0
BACK PAIN: 0
RHINORRHEA: 0
VOMITING: 0

## 2018-07-26 ASSESSMENT — PATIENT HEALTH QUESTIONNAIRE - PHQ9
SUM OF ALL RESPONSES TO PHQ9 QUESTIONS 1 & 2: 0
SUM OF ALL RESPONSES TO PHQ QUESTIONS 1-9: 0
1. LITTLE INTEREST OR PLEASURE IN DOING THINGS: 0
SUM OF ALL RESPONSES TO PHQ9 QUESTIONS 1 & 2: 0
2. FEELING DOWN, DEPRESSED OR HOPELESS: 0
1. LITTLE INTEREST OR PLEASURE IN DOING THINGS: 0
SUM OF ALL RESPONSES TO PHQ QUESTIONS 1-9: 0
2. FEELING DOWN, DEPRESSED OR HOPELESS: 0

## 2018-07-26 NOTE — PROGRESS NOTES
Visit Date: 7/26/2018    Mitra Villalba is a 80 y.o. female who presents today for:  Chief Complaint   Patient presents with    Pre-op Exam     sx on 8/12/18       HPI:     HPI     Here for pre-op clearance for reversal of colostomy and repair of hernia parastoma August 15, 2018    Patient is still doing Chemo every week on Tuesday Perjeta and Herciptin for metastatic breast cancer    Patient had a skin tear on the left forearm yesterday when she arrived into the handle of a cabinet      Current Medication:  has a current medication list which includes the following prescription(s): cephalexin, truform arm sleeve l 15-20mmhg, clopidogrel, simvastatin, ondansetron, levothyroxine, pantoprazole, ra loratadine, sertraline, isosorbide mononitrate, abdominal binder/elastic med, oyster shell calcium + d3, clindamycin, hydrocodone-acetaminophen, potassium chloride, blood glucose test strips, aspirin, one touch lancets, albuterol, nebulizer compressor, prednisone, acetaminophen, centrum silver adult 50+, refresh tears, lift chair, and timolol.     Allergies   Allergen Reactions    Bactrim [Sulfamethoxazole-Trimethoprim] Swelling     Of tongue    Demerol Rash     nausea    Pcn [Penicillins] Rash       Social History   Substance Use Topics    Smoking status: Former Smoker     Packs/day: 0.50     Types: Cigarettes    Smokeless tobacco: Never Used      Comment: trying to quit    Alcohol use No       Past Medical History:   Diagnosis Date    Anxiety     Blind right eye     Breast cancer metastasized to liver ([address] 05/10/2016    Liver lesion noted     Carotid stenosis      Left ICA 25%    Col[facility] place [facility])     Degenerative arthritis of cervical spine 5/07    GERD (gastroesophageal reflux disease) 11/06    Hypercholesteremia     Hypoestrogenism     Hypothyroidism     Lumbago     MDRO (multiple drug resistant organisms) resistance 2008    pt stated cleared    Sigmoid diverticulosis 8/04    Spondylolisthesis

## 2018-08-07 ENCOUNTER — HOSPITAL ENCOUNTER (OUTPATIENT)
Dept: NON INVASIVE DIAGNOSTICS | Age: 83
Discharge: HOME | End: 2018-08-07
Payer: MEDICARE

## 2018-08-07 DIAGNOSIS — Z01.818 PRE-OPERATIVE CLEARANCE: ICD-10-CM

## 2018-08-08 ENCOUNTER — TELEPHONE (OUTPATIENT)
Dept: SURGERY | Age: 83
End: 2018-08-08

## 2018-08-08 NOTE — TELEPHONE ENCOUNTER
Keren Snyder from [facility] is calling in stating that pt has a few concerns that she wanted to relay to us. Pt is being seen in office tomorrow. Pt is scheduled for chemo treatment on 8/14/18  She is also scheduled for a stress test on 8/14/18  She will also be doing a bowel prep if cleared for sx on 8/14/18    Tentative surgery date is 8/15/18. Keren Snyder is also asking if someone can call her regarding when to hold ASA and plavix and if surgery is scheduled and for what day.

## 2018-08-09 ENCOUNTER — OFFICE VISIT (OUTPATIENT)
Dept: SURGERY | Age: 83
End: 2018-08-09
Payer: MEDICARE

## 2018-08-09 VITALS
RESPIRATION RATE: 18 BRPM | HEIGHT: 63 IN | HEART RATE: 78 BPM | BODY MASS INDEX: 27.29 KG/M2 | TEMPERATURE: 97.9 F | SYSTOLIC BLOOD PRESSURE: 130 MMHG | WEIGHT: 154 LBS | DIASTOLIC BLOOD PRESSURE: 66 MMHG | OXYGEN SATURATION: 91 %

## 2018-08-09 DIAGNOSIS — Z93.3 COLOSTOMY STATUS (HCC): ICD-10-CM

## 2018-08-09 DIAGNOSIS — K43.5 PARASTOMAL HERNIA WITHOUT OBSTRUCTION OR GANGRENE: Primary | ICD-10-CM

## 2018-08-09 DIAGNOSIS — C78.7 BREAST CANCER METASTASIZED TO LIVER, LEFT (HCC): ICD-10-CM

## 2018-08-09 DIAGNOSIS — C50.912 BREAST CANCER METASTASIZED TO LIVER, LEFT (HCC): ICD-10-CM

## 2018-08-09 PROCEDURE — 1090F PRES/ABSN URINE INCON ASSESS: CPT | Performed by: SURGERY

## 2018-08-09 PROCEDURE — 1036F TOBACCO NON-USER: CPT | Performed by: SURGERY

## 2018-08-09 PROCEDURE — 1123F ACP DISCUSS/DSCN MKR DOCD: CPT | Performed by: SURGERY

## 2018-08-09 PROCEDURE — 4040F PNEUMOC VAC/ADMIN/RCVD: CPT | Performed by: SURGERY

## 2018-08-09 PROCEDURE — 1101F PT FALLS ASSESS-DOCD LE1/YR: CPT | Performed by: SURGERY

## 2018-08-09 PROCEDURE — G8598 ASA/ANTIPLAT THER USED: HCPCS | Performed by: SURGERY

## 2018-08-09 PROCEDURE — G8427 DOCREV CUR MEDS BY ELIG CLIN: HCPCS | Performed by: SURGERY

## 2018-08-09 PROCEDURE — G8399 PT W/DXA RESULTS DOCUMENT: HCPCS | Performed by: SURGERY

## 2018-08-09 PROCEDURE — 99214 OFFICE O/P EST MOD 30 MIN: CPT | Performed by: SURGERY

## 2018-08-09 PROCEDURE — G8417 CALC BMI ABV UP PARAM F/U: HCPCS | Performed by: SURGERY

## 2018-08-09 NOTE — PROGRESS NOTES
Lise Figueroa MD   General Surgery  Follow up Patient Evaluation in Office  Pt Name: Nithya Richard  Date of Birth 5/29/1933   Today's Date: 8/9/2018  Medical Record Number: [id number]  Referring Provider: No ref. provider found  Primary Care Provider: Davee Olszewski, MD  Chief Complaint:  Chief Complaint   Patient presents with    Pre-op Exam     discuss parastomal hernia repair-pt cleared by Dr. Galen Mosley-       ASSESSMENT      1. 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 Colostomy status ([facility] [address]    3. Breast cancer metastasized to liver, left ([facility])         PLANS      1. Review stress testing when completed  2. Preoperative mechanical and oral antibiotic bowel prep  3. Open takedown of colostomy with anastomosis and repair of abdominal wall parastomal site hernia  4. Preoperative testing per anesthesia guidelines  5. Surgical approaches were discussed. I think patient would have a high conversion to an open rate if a minimally invasive robotic assisted approach were attempted and risk of bowel injury. As such recommend an open approach. 6.  Risks of procedure were discussed with the patient and her niece. Risks including but not limited to bleeding infection unable to safely reanastomose her take down her colostomy. Potential need for a diverting ileostomy, perioperative cardiac pulmonary complications and even death. Latanya expressed understanding wishes to proceed. Nakul Lerma is a 80y.o. year old female who is presenting today in the office for Follow-up evaluation for symptomatic parastomal hernia and possible repair and reversal of her colostomy. She had a colostomy in 2016 robotic-assisted laparoscopic for near complete obstruction from diverticular disease. Dr. Marleen Rodriguez had performed previous colonoscopies. Ann Caputo was diagnosed with breast carcinoma with metastasis to the liver. She has been under medical oncologic care.   Most recent imaging studies showed no resection    SUBCLAVIAN / PULMONARY SHUNT  2002    UPPER GASTROINTESTINAL ENDOSCOPY  11/06       Medications  Current Outpatient Prescriptions   Medication Sig Dispense Refill    cephALEXin (KEFLEX) 500 MG capsule Take 1 capsule by mouth 3 times daily 30 capsule 0    Elastic Bandages & Supports (TRUFORM ARM SLEEVE L 15-20MMHG) MISC 2 Units by Does not apply route daily 2 each 0    clopidogrel (PLAVIX) 75 MG tablet take 1 tablet by mouth once daily 30 tablet 5    simvastatin (ZOCOR) 20 MG tablet take 1 tablet by mouth at bedtime 90 tablet 1    ondansetron (ZOFRAN ODT) 4 MG disintegrating tablet Take 1 tablet by mouth every 8 hours as needed for Nausea 20 tablet 0    levothyroxine (SYNTHROID) 88 MCG tablet take 1 tablet by mouth once daily 90 tablet 0    pantoprazole (PROTONIX) 40 MG tablet take 1 tablet by mouth once daily 30 tablet 5    RA LORATADINE 10 MG tablet take 1 tablet by mouth once daily 90 tablet 1    sertraline (ZOLOFT) 50 MG tablet take 1 tablet by mouth twice a day 180 tablet 1    isosorbide mononitrate (IMDUR) 30 MG extended release tablet take 1 tablet by mouth once daily 45 tablet 3    Elastic Bandages & Supports (ABDOMINAL BINDER/ELASTIC MED) MISC Apply daily and as needed 1 each 0    OYSTER SHELL CALCIUM + D3 500-400 MG-UNIT TABS take 1 tablet by mouth twice a day 100 tablet 3    clindamycin (CLEOCIN) 300 MG capsule Take 1 capsule by mouth 3 times daily 21 capsule 0    HYDROcodone-acetaminophen (NORCO) 5-325 MG per tablet take 1 tablet by mouth twice a day if needed  0    potassium chloride (KLOR-CON) 10 MEQ extended release tablet Take 1 tablet by mouth daily  0    glucose blood VI test strips (ONETOUCH VERIO) strip Use to check blood sugar once daily.  E11.9 100 each 3    aspirin (RA ASPIRIN ADULT LOW STRENGTH) 81 MG chewable tablet Take 1 tablet by mouth daily 100 tablet 3    ONE TOUCH LANCETS MISC 1 each by Does not apply route daily 100 each 1    albuterol (PROVENTIL) (2.5 MG/3ML) 0.083% nebulizer solution Take 3 mLs by nebulization every 6 hours as needed for Wheezing 30 vial 1    Nebulizers (NEBULIZER COMPRESSOR) MISC Albuterol 1 unit dose every 6 hours for wheezing and chest congestion 1 each 0    predniSONE (DELTASONE) 1 MG tablet Take 1 mg by mouth daily 2 tablets in am      acetaminophen (TYLENOL) 500 MG tablet Take 1,000 mg by mouth as needed for Pain      Multiple Vitamins-Minerals (CENTRUM SILVER ADULT 50+) TABS Take 1 tablet by mouth daily 90 tablet 1    REFRESH TEARS 0.5 % SOLN ophthalmic solution Place 1 drop into both eyes 4 times daily  1    Lift Chair MISC by Does not apply route 1 each 0    timolol (BETIMOL) 0.5 % ophthalmic solution Place 1 drop into the right eye 2 times daily. No current facility-administered medications for this visit. Allergies  Allergies   Allergen Reactions    Bactrim [Sulfamethoxazole-Trimethoprim] Swelling     Of tongue    Demerol Rash     nausea    Pcn [Penicillins] Rash       Family History  Family History   Problem Relation Age of Onset    Cancer Sister 61        breast    Cancer Brother 79        lung    Cancer Brother 68        colon    Cancer Son 48        lung       Social History  Social History     Social History    Marital status:      Spouse name: N/A    Number of children: N/A    Years of education: N/A     Occupational History    Not on file. Social History Main Topics    Smoking status: Former Smoker     Packs/day: 0.50     Types: Cigarettes    Smokeless tobacco: Never Used      Comment: trying to quit    Alcohol use No    Drug use: No    Sexual activity: No     Other Topics Concern    Not on file     Social History Narrative    No narrative on file           Review of Systems  Review of Systems   Constitutional: Positive for diaphoresis. Negative for chills, fatigue, fever and unexpected weight change. HENT: Positive for postnasal drip, rhinorrhea and sinus pressure.  Negative for hernia. Musculoskeletal: Normal range of motion. She exhibits no edema. Lymphadenopathy:     She has no cervical adenopathy. Neurological: She is alert and oriented to person, place, and time. No cranial nerve deficit. Skin: Skin is warm and dry. No rash noted. Psychiatric: She has a normal mood and affect. Vitals reviewed. 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       COMPARISON: 4/28/2017       TECHNIQUE: Images of the abdomen and pelvis after IV and oral contrast with Isovue-370 IV contrast. Omnipaque oral contrast. Multiplanar reconstructions. All CT scans at this facility use dose modulation, iterative reconstruction, and/or weight-based dosing when appropriate to reduce radiation dose to as low as reasonably achievable.       FINDINGS: Lung bases   Lung bases are clear undersurface of the heart is unremarkable. Abdomen pelvis   The liver, spleen, and pancreas are within normal limits. Prior cholecystectomy. Adrenals are normal.   Kidneys enhance symmetrically. There is mild to moderate pelviectasis on the right. There is a simple cyst left kidney which is stable. Aorta is moderately atherosclerotic. 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 There is a stent in the    right common iliac artery which is patent. There is no central or peritoneal adenopathy. Patient has a left anterior abdominal ostomy. There is a.  Stomal hernia which contains

## 2018-08-10 ASSESSMENT — ENCOUNTER SYMPTOMS
VOMITING: 0
SORE THROAT: 0
VOICE CHANGE: 0
COUGH: 0
SINUS PRESSURE: 1
RHINORRHEA: 1
SHORTNESS OF BREATH: 0
TROUBLE SWALLOWING: 0
ABDOMINAL PAIN: 1
WHEEZING: 0
BLOOD IN STOOL: 0
NAUSEA: 0
BACK PAIN: 1
COLOR CHANGE: 0

## 2018-08-14 ENCOUNTER — HOSPITAL ENCOUNTER (OUTPATIENT)
Dept: NON INVASIVE DIAGNOSTICS | Age: 83
Discharge: HOME | End: 2018-08-14
Payer: MEDICARE

## 2018-08-14 VITALS — BODY MASS INDEX: 27.11 KG/M2 | WEIGHT: 153 LBS | HEIGHT: 63 IN

## 2018-08-14 PROCEDURE — 2709999900 HC NON-CHARGEABLE SUPPLY

## 2018-08-14 PROCEDURE — 3430000000 HC RX DIAGNOSTIC RADIOPHARMACEUTICAL: Performed by: EMERGENCY MEDICINE

## 2018-08-14 PROCEDURE — 93017 CV STRESS TEST TRACING ONLY: CPT | Performed by: INTERNAL MEDICINE

## 2018-08-14 PROCEDURE — A9500 TC99M SESTAMIBI: HCPCS | Performed by: EMERGENCY MEDICINE

## 2018-08-14 PROCEDURE — 78452 HT MUSCLE IMAGE SPECT MULT: CPT

## 2018-08-14 PROCEDURE — 6360000002 HC RX W HCPCS

## 2018-08-14 RX ADMIN — Medication 34.2 MILLICURIE: at 09:20

## 2018-08-14 RX ADMIN — Medication 10 MILLICURIE: at 08:15

## 2018-08-15 ENCOUNTER — TELEPHONE (OUTPATIENT)
Dept: SURGERY | Age: 83
End: 2018-08-15

## 2018-08-30 RX ORDER — LEVOTHYROXINE SODIUM 88 UG/1
88 TABLET ORAL DAILY
Qty: 90 TABLET | Refills: 1 | Status: SHIPPED | OUTPATIENT
Start: 2018-08-30 | End: 2019-02-19 | Stop reason: SDUPTHER

## 2018-08-30 RX ORDER — METRONIDAZOLE 500 MG/1
TABLET ORAL
Qty: 3 TABLET | Refills: 0 | Status: ON HOLD | OUTPATIENT
Start: 2018-08-30 | End: 2018-09-05 | Stop reason: ALTCHOICE

## 2018-09-02 DIAGNOSIS — M85.89 OSTEOPENIA OF MULTIPLE SITES: ICD-10-CM

## 2018-09-04 ENCOUNTER — ANESTHESIA EVENT (OUTPATIENT)
Dept: OPERATING ROOM | Age: 83
DRG: 330 | End: 2018-09-04
Payer: MEDICARE

## 2018-09-04 ENCOUNTER — HOSPITAL ENCOUNTER (INPATIENT)
Age: 83
LOS: 6 days | Discharge: NURSING, REHAB OR LONG-TERM CARE | DRG: 330 | End: 2018-09-10
Attending: SURGERY | Admitting: EMERGENCY MEDICINE
Payer: MEDICARE

## 2018-09-04 ENCOUNTER — ANESTHESIA (OUTPATIENT)
Dept: OPERATING ROOM | Age: 83
DRG: 330 | End: 2018-09-04
Payer: MEDICARE

## 2018-09-04 VITALS
DIASTOLIC BLOOD PRESSURE: 67 MMHG | TEMPERATURE: 96.1 F | SYSTOLIC BLOOD PRESSURE: 160 MMHG | RESPIRATION RATE: 1 BRPM | OXYGEN SATURATION: 100 %

## 2018-09-04 PROBLEM — K43.5 PARASTOMAL HERNIA WITHOUT OBSTRUCTION OR GANGRENE: Status: ACTIVE | Noted: 2018-09-04

## 2018-09-04 PROBLEM — Z79.52 CURRENT CHRONIC USE OF SYSTEMIC STEROIDS: Status: ACTIVE | Noted: 2018-09-04

## 2018-09-04 LAB
ABO: NORMAL
ANTIBODY SCREEN: NORMAL
HCT VFR BLD CALC: 31.7 % (ref 37–47)
HEMOGLOBIN: 9.6 GM/DL (ref 12–16)
POTASSIUM REFLEX MAGNESIUM: 4.4 MEQ/L (ref 3.5–5.2)
RH FACTOR: NORMAL

## 2018-09-04 PROCEDURE — 84132 ASSAY OF SERUM POTASSIUM: CPT

## 2018-09-04 PROCEDURE — 2709999900 HC NON-CHARGEABLE SUPPLY: Performed by: SURGERY

## 2018-09-04 PROCEDURE — 3600000004 HC SURGERY LEVEL 4 BASE: Performed by: SURGERY

## 2018-09-04 PROCEDURE — 86850 RBC ANTIBODY SCREEN: CPT

## 2018-09-04 PROCEDURE — 2500000003 HC RX 250 WO HCPCS: Performed by: SURGERY

## 2018-09-04 PROCEDURE — 2580000003 HC RX 258: Performed by: SURGERY

## 2018-09-04 PROCEDURE — 44120 REMOVAL OF SMALL INTESTINE: CPT | Performed by: SURGERY

## 2018-09-04 PROCEDURE — 6360000002 HC RX W HCPCS: Performed by: SURGERY

## 2018-09-04 PROCEDURE — 3700000000 HC ANESTHESIA ATTENDED CARE: Performed by: SURGERY

## 2018-09-04 PROCEDURE — 2500000003 HC RX 250 WO HCPCS: Performed by: NURSE ANESTHETIST, CERTIFIED REGISTERED

## 2018-09-04 PROCEDURE — L0625 LO FLEX L1-BELOW L5 PRE OTS: HCPCS | Performed by: SURGERY

## 2018-09-04 PROCEDURE — 7100000000 HC PACU RECOVERY - FIRST 15 MIN: Performed by: SURGERY

## 2018-09-04 PROCEDURE — 85018 HEMOGLOBIN: CPT

## 2018-09-04 PROCEDURE — 6360000002 HC RX W HCPCS: Performed by: NURSE ANESTHETIST, CERTIFIED REGISTERED

## 2018-09-04 PROCEDURE — 85014 HEMATOCRIT: CPT

## 2018-09-04 PROCEDURE — 86901 BLOOD TYPING SEROLOGIC RH(D): CPT

## 2018-09-04 PROCEDURE — 6370000000 HC RX 637 (ALT 250 FOR IP): Performed by: SURGERY

## 2018-09-04 PROCEDURE — 36415 COLL VENOUS BLD VENIPUNCTURE: CPT

## 2018-09-04 PROCEDURE — 44626 REPAIR BOWEL OPENING: CPT | Performed by: SURGERY

## 2018-09-04 PROCEDURE — 86900 BLOOD TYPING SEROLOGIC ABO: CPT

## 2018-09-04 PROCEDURE — 1200000003 HC TELEMETRY R&B

## 2018-09-04 PROCEDURE — 3700000001 HC ADD 15 MINUTES (ANESTHESIA): Performed by: SURGERY

## 2018-09-04 PROCEDURE — 7100000001 HC PACU RECOVERY - ADDTL 15 MIN: Performed by: SURGERY

## 2018-09-04 PROCEDURE — 88305 TISSUE EXAM BY PATHOLOGIST: CPT

## 2018-09-04 PROCEDURE — 2700000000 HC OXYGEN THERAPY PER DAY

## 2018-09-04 PROCEDURE — 6360000002 HC RX W HCPCS

## 2018-09-04 PROCEDURE — 0DBM0ZZ EXCISION OF DESCENDING COLON, OPEN APPROACH: ICD-10-PCS | Performed by: SURGERY

## 2018-09-04 PROCEDURE — 3600000014 HC SURGERY LEVEL 4 ADDTL 15MIN: Performed by: SURGERY

## 2018-09-04 PROCEDURE — 2720000010 HC SURG SUPPLY STERILE: Performed by: SURGERY

## 2018-09-04 PROCEDURE — 88304 TISSUE EXAM BY PATHOLOGIST: CPT

## 2018-09-04 PROCEDURE — 2780000010 HC IMPLANT OTHER: Performed by: SURGERY

## 2018-09-04 PROCEDURE — 0WQF0ZZ REPAIR ABDOMINAL WALL, OPEN APPROACH: ICD-10-PCS | Performed by: SURGERY

## 2018-09-04 PROCEDURE — 94761 N-INVAS EAR/PLS OXIMETRY MLT: CPT

## 2018-09-04 PROCEDURE — 0DB80ZZ EXCISION OF SMALL INTESTINE, OPEN APPROACH: ICD-10-PCS | Performed by: SURGERY

## 2018-09-04 PROCEDURE — 0DN80ZZ RELEASE SMALL INTESTINE, OPEN APPROACH: ICD-10-PCS | Performed by: SURGERY

## 2018-09-04 PROCEDURE — P9045 ALBUMIN (HUMAN), 5%, 250 ML: HCPCS | Performed by: NURSE ANESTHETIST, CERTIFIED REGISTERED

## 2018-09-04 PROCEDURE — 0DNE0ZZ RELEASE LARGE INTESTINE, OPEN APPROACH: ICD-10-PCS | Performed by: SURGERY

## 2018-09-04 DEVICE — SEALANT HEMSTAT 5ML HUM FIBRIN THROM 2 VI APPL DEV EVICEL: Type: IMPLANTABLE DEVICE | Status: FUNCTIONAL

## 2018-09-04 DEVICE — RELOAD STPL L55MM OPN H3.8MM CLS H1.5MM WIRE DIA0.2MM REG: Type: IMPLANTABLE DEVICE | Status: FUNCTIONAL

## 2018-09-04 DEVICE — RELOAD STPL H2X4.7XL60MM THCK TISS GRN B FRM AUTO RET PIN: Type: IMPLANTABLE DEVICE | Status: FUNCTIONAL

## 2018-09-04 RX ORDER — CIPROFLOXACIN 2 MG/ML
400 INJECTION, SOLUTION INTRAVENOUS
Status: DISPENSED | OUTPATIENT
Start: 2018-09-04 | End: 2018-09-04

## 2018-09-04 RX ORDER — LABETALOL HYDROCHLORIDE 5 MG/ML
5 INJECTION, SOLUTION INTRAVENOUS EVERY 5 MIN PRN
Status: DISCONTINUED | OUTPATIENT
Start: 2018-09-04 | End: 2018-09-04 | Stop reason: HOSPADM

## 2018-09-04 RX ORDER — HYDROCODONE BITARTRATE AND ACETAMINOPHEN 5; 325 MG/1; MG/1
2 TABLET ORAL EVERY 4 HOURS PRN
Status: DISCONTINUED | OUTPATIENT
Start: 2018-09-04 | End: 2018-09-10 | Stop reason: HOSPADM

## 2018-09-04 RX ORDER — FENTANYL CITRATE 50 UG/ML
INJECTION, SOLUTION INTRAMUSCULAR; INTRAVENOUS
Status: COMPLETED
Start: 2018-09-04 | End: 2018-09-04

## 2018-09-04 RX ORDER — ISOSORBIDE MONONITRATE 30 MG/1
TABLET, EXTENDED RELEASE ORAL
Qty: 45 TABLET | Refills: 3 | Status: SHIPPED | OUTPATIENT
Start: 2018-09-04 | End: 2019-03-11 | Stop reason: SDUPTHER

## 2018-09-04 RX ORDER — ACETAMINOPHEN 500 MG
1000 TABLET ORAL EVERY 6 HOURS PRN
Status: DISCONTINUED | OUTPATIENT
Start: 2018-09-04 | End: 2018-09-04 | Stop reason: ALTCHOICE

## 2018-09-04 RX ORDER — TIMOLOL MALEATE 5 MG/ML
1 SOLUTION/ DROPS OPHTHALMIC 2 TIMES DAILY
Status: DISCONTINUED | OUTPATIENT
Start: 2018-09-04 | End: 2018-09-10 | Stop reason: HOSPADM

## 2018-09-04 RX ORDER — ROCURONIUM BROMIDE 10 MG/ML
INJECTION, SOLUTION INTRAVENOUS PRN
Status: DISCONTINUED | OUTPATIENT
Start: 2018-09-04 | End: 2018-09-04 | Stop reason: SDUPTHER

## 2018-09-04 RX ORDER — GLYCOPYRROLATE 1 MG/5 ML
SYRINGE (ML) INTRAVENOUS PRN
Status: DISCONTINUED | OUTPATIENT
Start: 2018-09-04 | End: 2018-09-04 | Stop reason: SDUPTHER

## 2018-09-04 RX ORDER — ISOSORBIDE MONONITRATE 30 MG/1
30 TABLET, EXTENDED RELEASE ORAL DAILY
Status: DISCONTINUED | OUTPATIENT
Start: 2018-09-05 | End: 2018-09-10 | Stop reason: HOSPADM

## 2018-09-04 RX ORDER — DIPHENHYDRAMINE HYDROCHLORIDE 50 MG/ML
12.5 INJECTION INTRAMUSCULAR; INTRAVENOUS
Status: DISCONTINUED | OUTPATIENT
Start: 2018-09-04 | End: 2018-09-04 | Stop reason: HOSPADM

## 2018-09-04 RX ORDER — ALBUTEROL SULFATE 2.5 MG/3ML
2.5 SOLUTION RESPIRATORY (INHALATION) EVERY 6 HOURS PRN
Status: DISCONTINUED | OUTPATIENT
Start: 2018-09-04 | End: 2018-09-10 | Stop reason: HOSPADM

## 2018-09-04 RX ORDER — ACETAMINOPHEN 325 MG/1
650 TABLET ORAL EVERY 4 HOURS PRN
Status: DISCONTINUED | OUTPATIENT
Start: 2018-09-04 | End: 2018-09-10 | Stop reason: HOSPADM

## 2018-09-04 RX ORDER — EPHEDRINE SULFATE 50 MG/ML
INJECTION, SOLUTION INTRAVENOUS PRN
Status: DISCONTINUED | OUTPATIENT
Start: 2018-09-04 | End: 2018-09-04 | Stop reason: SDUPTHER

## 2018-09-04 RX ORDER — MORPHINE SULFATE 2 MG/ML
2 INJECTION, SOLUTION INTRAMUSCULAR; INTRAVENOUS
Status: DISCONTINUED | OUTPATIENT
Start: 2018-09-04 | End: 2018-09-10 | Stop reason: HOSPADM

## 2018-09-04 RX ORDER — LIDOCAINE HYDROCHLORIDE 20 MG/ML
INJECTION, SOLUTION INFILTRATION; PERINEURAL PRN
Status: DISCONTINUED | OUTPATIENT
Start: 2018-09-04 | End: 2018-09-04 | Stop reason: SDUPTHER

## 2018-09-04 RX ORDER — ALVIMOPAN 12 MG/1
12 CAPSULE ORAL ONCE
Status: COMPLETED | OUTPATIENT
Start: 2018-09-04 | End: 2018-09-04

## 2018-09-04 RX ORDER — ALPRAZOLAM 0.5 MG/1
0.5 TABLET ORAL 3 TIMES DAILY PRN
Status: DISCONTINUED | OUTPATIENT
Start: 2018-09-04 | End: 2018-09-10 | Stop reason: HOSPADM

## 2018-09-04 RX ORDER — PROMETHAZINE HYDROCHLORIDE 25 MG/ML
INJECTION, SOLUTION INTRAMUSCULAR; INTRAVENOUS PRN
Status: DISCONTINUED | OUTPATIENT
Start: 2018-09-04 | End: 2018-09-04 | Stop reason: SDUPTHER

## 2018-09-04 RX ORDER — ALBUMIN, HUMAN INJ 5% 5 %
SOLUTION INTRAVENOUS PRN
Status: DISCONTINUED | OUTPATIENT
Start: 2018-09-04 | End: 2018-09-04 | Stop reason: SDUPTHER

## 2018-09-04 RX ORDER — NEOSTIGMINE METHYLSULFATE 1 MG/ML
INJECTION, SOLUTION INTRAVENOUS PRN
Status: DISCONTINUED | OUTPATIENT
Start: 2018-09-04 | End: 2018-09-04 | Stop reason: SDUPTHER

## 2018-09-04 RX ORDER — MORPHINE SULFATE 4 MG/ML
4 INJECTION, SOLUTION INTRAMUSCULAR; INTRAVENOUS
Status: DISCONTINUED | OUTPATIENT
Start: 2018-09-04 | End: 2018-09-10 | Stop reason: HOSPADM

## 2018-09-04 RX ORDER — FENTANYL CITRATE 50 UG/ML
50 INJECTION, SOLUTION INTRAMUSCULAR; INTRAVENOUS ONCE
Status: COMPLETED | OUTPATIENT
Start: 2018-09-04 | End: 2018-09-04

## 2018-09-04 RX ORDER — HYDROCODONE BITARTRATE AND ACETAMINOPHEN 5; 325 MG/1; MG/1
1 TABLET ORAL EVERY 4 HOURS PRN
Status: DISCONTINUED | OUTPATIENT
Start: 2018-09-04 | End: 2018-09-10 | Stop reason: HOSPADM

## 2018-09-04 RX ORDER — HYDRALAZINE HYDROCHLORIDE 20 MG/ML
5 INJECTION INTRAMUSCULAR; INTRAVENOUS EVERY 10 MIN PRN
Status: DISCONTINUED | OUTPATIENT
Start: 2018-09-04 | End: 2018-09-04 | Stop reason: HOSPADM

## 2018-09-04 RX ORDER — SODIUM CHLORIDE 0.9 % (FLUSH) 0.9 %
10 SYRINGE (ML) INJECTION EVERY 12 HOURS SCHEDULED
Status: DISCONTINUED | OUTPATIENT
Start: 2018-09-04 | End: 2018-09-10 | Stop reason: HOSPADM

## 2018-09-04 RX ORDER — SODIUM CHLORIDE, SODIUM LACTATE, POTASSIUM CHLORIDE, CALCIUM CHLORIDE 600; 310; 30; 20 MG/100ML; MG/100ML; MG/100ML; MG/100ML
INJECTION, SOLUTION INTRAVENOUS CONTINUOUS
Status: DISCONTINUED | OUTPATIENT
Start: 2018-09-04 | End: 2018-09-04

## 2018-09-04 RX ORDER — CIPROFLOXACIN 2 MG/ML
400 INJECTION, SOLUTION INTRAVENOUS EVERY 12 HOURS
Status: COMPLETED | OUTPATIENT
Start: 2018-09-04 | End: 2018-09-04

## 2018-09-04 RX ORDER — SODIUM CHLORIDE 0.9 % (FLUSH) 0.9 %
10 SYRINGE (ML) INJECTION PRN
Status: DISCONTINUED | OUTPATIENT
Start: 2018-09-04 | End: 2018-09-10 | Stop reason: HOSPADM

## 2018-09-04 RX ORDER — CALCIUM CARBONATE/VITAMIN D3 500 MG-10
TABLET ORAL
Qty: 100 TABLET | Refills: 3 | Status: SHIPPED | OUTPATIENT
Start: 2018-09-04 | End: 2019-03-31 | Stop reason: SDUPTHER

## 2018-09-04 RX ORDER — BUPIVACAINE HYDROCHLORIDE AND EPINEPHRINE 5; 5 MG/ML; UG/ML
INJECTION, SOLUTION EPIDURAL; INTRACAUDAL; PERINEURAL PRN
Status: DISCONTINUED | OUTPATIENT
Start: 2018-09-04 | End: 2018-09-04 | Stop reason: HOSPADM

## 2018-09-04 RX ORDER — SODIUM CHLORIDE 9 MG/ML
INJECTION, SOLUTION INTRAVENOUS CONTINUOUS
Status: DISCONTINUED | OUTPATIENT
Start: 2018-09-04 | End: 2018-09-08

## 2018-09-04 RX ORDER — SODIUM CHLORIDE 0.9 % (FLUSH) 0.9 %
10 SYRINGE (ML) INJECTION EVERY 12 HOURS SCHEDULED
Status: DISCONTINUED | OUTPATIENT
Start: 2018-09-04 | End: 2018-09-04 | Stop reason: HOSPADM

## 2018-09-04 RX ORDER — MORPHINE SULFATE 2 MG/ML
2 INJECTION, SOLUTION INTRAMUSCULAR; INTRAVENOUS EVERY 5 MIN PRN
Status: DISCONTINUED | OUTPATIENT
Start: 2018-09-04 | End: 2018-09-04 | Stop reason: HOSPADM

## 2018-09-04 RX ORDER — PANTOPRAZOLE SODIUM 40 MG/1
40 TABLET, DELAYED RELEASE ORAL DAILY
Status: DISCONTINUED | OUTPATIENT
Start: 2018-09-04 | End: 2018-09-10 | Stop reason: HOSPADM

## 2018-09-04 RX ORDER — METHYLPREDNISOLONE SODIUM SUCCINATE 40 MG/ML
40 INJECTION, POWDER, LYOPHILIZED, FOR SOLUTION INTRAMUSCULAR; INTRAVENOUS DAILY
Status: DISCONTINUED | OUTPATIENT
Start: 2018-09-04 | End: 2018-09-04

## 2018-09-04 RX ORDER — OYSTER SHELL CALCIUM WITH VITAMIN D 500; 200 MG/1; [IU]/1
1 TABLET, FILM COATED ORAL DAILY
Status: DISCONTINUED | OUTPATIENT
Start: 2018-09-04 | End: 2018-09-10 | Stop reason: HOSPADM

## 2018-09-04 RX ORDER — ONDANSETRON 2 MG/ML
4 INJECTION INTRAMUSCULAR; INTRAVENOUS EVERY 6 HOURS PRN
Status: DISCONTINUED | OUTPATIENT
Start: 2018-09-04 | End: 2018-09-10 | Stop reason: HOSPADM

## 2018-09-04 RX ORDER — PROPOFOL 10 MG/ML
INJECTION, EMULSION INTRAVENOUS PRN
Status: DISCONTINUED | OUTPATIENT
Start: 2018-09-04 | End: 2018-09-04 | Stop reason: SDUPTHER

## 2018-09-04 RX ORDER — ALVIMOPAN 12 MG/1
12 CAPSULE ORAL 2 TIMES DAILY
Status: DISCONTINUED | OUTPATIENT
Start: 2018-09-04 | End: 2018-09-08

## 2018-09-04 RX ORDER — LEVOTHYROXINE SODIUM 88 UG/1
88 TABLET ORAL DAILY
Status: DISCONTINUED | OUTPATIENT
Start: 2018-09-05 | End: 2018-09-10 | Stop reason: HOSPADM

## 2018-09-04 RX ORDER — M-VIT,TX,IRON,MINS/CALC/FOLIC 27MG-0.4MG
1 TABLET ORAL DAILY
Status: DISCONTINUED | OUTPATIENT
Start: 2018-09-04 | End: 2018-09-10 | Stop reason: HOSPADM

## 2018-09-04 RX ORDER — ONDANSETRON 4 MG/1
4 TABLET, ORALLY DISINTEGRATING ORAL EVERY 8 HOURS PRN
Status: DISCONTINUED | OUTPATIENT
Start: 2018-09-04 | End: 2018-09-10 | Stop reason: HOSPADM

## 2018-09-04 RX ORDER — SODIUM CHLORIDE 0.9 % (FLUSH) 0.9 %
10 SYRINGE (ML) INJECTION PRN
Status: DISCONTINUED | OUTPATIENT
Start: 2018-09-04 | End: 2018-09-04 | Stop reason: HOSPADM

## 2018-09-04 RX ORDER — ONDANSETRON 2 MG/ML
4 INJECTION INTRAMUSCULAR; INTRAVENOUS
Status: DISCONTINUED | OUTPATIENT
Start: 2018-09-04 | End: 2018-09-04 | Stop reason: HOSPADM

## 2018-09-04 RX ORDER — FENTANYL CITRATE 50 UG/ML
INJECTION, SOLUTION INTRAMUSCULAR; INTRAVENOUS PRN
Status: DISCONTINUED | OUTPATIENT
Start: 2018-09-04 | End: 2018-09-04 | Stop reason: SDUPTHER

## 2018-09-04 RX ORDER — POLYVINYL ALCOHOL 14 MG/ML
1 SOLUTION/ DROPS OPHTHALMIC 4 TIMES DAILY
Status: DISCONTINUED | OUTPATIENT
Start: 2018-09-04 | End: 2018-09-04 | Stop reason: CLARIF

## 2018-09-04 RX ADMIN — DEXTRAN 70 AND HYPROMELLOSE 2910 1 DROP: 1; 3 SOLUTION/ DROPS OPHTHALMIC at 22:49

## 2018-09-04 RX ADMIN — MORPHINE SULFATE 4 MG: 4 INJECTION INTRAVENOUS at 18:10

## 2018-09-04 RX ADMIN — TIMOLOL MALEATE 1 DROP: 5 SOLUTION OPHTHALMIC at 22:49

## 2018-09-04 RX ADMIN — HYDROCORTISONE SODIUM SUCCINATE 100 MG: 100 INJECTION, POWDER, FOR SOLUTION INTRAMUSCULAR; INTRAVENOUS at 07:13

## 2018-09-04 RX ADMIN — METRONIDAZOLE 500 MG: 500 INJECTION, SOLUTION INTRAVENOUS at 15:10

## 2018-09-04 RX ADMIN — ROCURONIUM BROMIDE 10 MG: 10 INJECTION INTRAVENOUS at 09:18

## 2018-09-04 RX ADMIN — CIPROFLOXACIN 400 MG: 2 INJECTION, SOLUTION INTRAVENOUS at 13:51

## 2018-09-04 RX ADMIN — DEXTRAN 70 AND HYPROMELLOSE 2910 1 DROP: 1; 3 SOLUTION/ DROPS OPHTHALMIC at 18:00

## 2018-09-04 RX ADMIN — ALVIMOPAN 12 MG: 12 CAPSULE ORAL at 15:09

## 2018-09-04 RX ADMIN — ROCURONIUM BROMIDE 10 MG: 10 INJECTION INTRAVENOUS at 08:42

## 2018-09-04 RX ADMIN — SODIUM CHLORIDE, POTASSIUM CHLORIDE, SODIUM LACTATE AND CALCIUM CHLORIDE: 600; 310; 30; 20 INJECTION, SOLUTION INTRAVENOUS at 08:57

## 2018-09-04 RX ADMIN — SODIUM CHLORIDE: 9 INJECTION, SOLUTION INTRAVENOUS at 12:32

## 2018-09-04 RX ADMIN — HYDROCORTISONE SODIUM SUCCINATE 100 MG: 100 INJECTION, POWDER, FOR SOLUTION INTRAMUSCULAR; INTRAVENOUS at 07:46

## 2018-09-04 RX ADMIN — ROCURONIUM BROMIDE 40 MG: 10 INJECTION INTRAVENOUS at 07:44

## 2018-09-04 RX ADMIN — ONDANSETRON 4 MG: 4 TABLET, ORALLY DISINTEGRATING ORAL at 13:27

## 2018-09-04 RX ADMIN — ALVIMOPAN 12 MG: 12 CAPSULE ORAL at 22:48

## 2018-09-04 RX ADMIN — LIDOCAINE HYDROCHLORIDE 60 MG: 20 INJECTION, SOLUTION INFILTRATION; PERINEURAL at 07:44

## 2018-09-04 RX ADMIN — ALVIMOPAN 12 MG: 12 CAPSULE ORAL at 07:13

## 2018-09-04 RX ADMIN — CALCIUM CARBONATE-VITAMIN D TAB 500 MG-200 UNIT 1 TABLET: 500-200 TAB at 13:54

## 2018-09-04 RX ADMIN — HYDROMORPHONE HYDROCHLORIDE 0.5 MG: 1 INJECTION, SOLUTION INTRAMUSCULAR; INTRAVENOUS; SUBCUTANEOUS at 11:25

## 2018-09-04 RX ADMIN — HYDROCORTISONE SODIUM SUCCINATE 50 MG: 100 INJECTION, POWDER, FOR SOLUTION INTRAMUSCULAR; INTRAVENOUS at 22:48

## 2018-09-04 RX ADMIN — NEOSTIGMINE METHYLSULFATE 3 MG: 1 INJECTION, SOLUTION INTRAVENOUS at 10:20

## 2018-09-04 RX ADMIN — FENTANYL CITRATE 50 MCG: 50 INJECTION INTRAMUSCULAR; INTRAVENOUS at 08:10

## 2018-09-04 RX ADMIN — Medication 0.5 MG: at 11:25

## 2018-09-04 RX ADMIN — PANTOPRAZOLE SODIUM 40 MG: 40 TABLET, DELAYED RELEASE ORAL at 13:55

## 2018-09-04 RX ADMIN — PHENYLEPHRINE HYDROCHLORIDE 100 MCG: 10 INJECTION INTRAVENOUS at 09:25

## 2018-09-04 RX ADMIN — HYDROCORTISONE SODIUM SUCCINATE 50 MG: 100 INJECTION, POWDER, FOR SOLUTION INTRAMUSCULAR; INTRAVENOUS at 15:09

## 2018-09-04 RX ADMIN — Medication 0.4 MG: at 10:20

## 2018-09-04 RX ADMIN — METRONIDAZOLE 500 MG: 500 INJECTION, SOLUTION INTRAVENOUS at 07:56

## 2018-09-04 RX ADMIN — MORPHINE SULFATE 4 MG: 4 INJECTION INTRAVENOUS at 13:30

## 2018-09-04 RX ADMIN — PROMETHAZINE HYDROCHLORIDE 6.25 MG: 25 INJECTION INTRAMUSCULAR; INTRAVENOUS at 08:06

## 2018-09-04 RX ADMIN — MULTIPLE VITAMINS W/ MINERALS TAB 1 TABLET: TAB at 13:54

## 2018-09-04 RX ADMIN — SODIUM CHLORIDE, POTASSIUM CHLORIDE, SODIUM LACTATE AND CALCIUM CHLORIDE: 600; 310; 30; 20 INJECTION, SOLUTION INTRAVENOUS at 07:11

## 2018-09-04 RX ADMIN — PROPOFOL 100 MG: 10 INJECTION, EMULSION INTRAVENOUS at 07:44

## 2018-09-04 RX ADMIN — SERTRALINE 50 MG: 50 TABLET, FILM COATED ORAL at 22:48

## 2018-09-04 RX ADMIN — FENTANYL CITRATE 50 MCG: 50 INJECTION, SOLUTION INTRAMUSCULAR; INTRAVENOUS at 10:36

## 2018-09-04 RX ADMIN — ALBUMIN (HUMAN) 250 ML: 12.5 SOLUTION INTRAVENOUS at 09:52

## 2018-09-04 RX ADMIN — EPHEDRINE SULFATE 10 MG: 50 INJECTION INTRAMUSCULAR; INTRAVENOUS; SUBCUTANEOUS at 09:20

## 2018-09-04 RX ADMIN — FENTANYL CITRATE 50 MCG: 50 INJECTION INTRAMUSCULAR; INTRAVENOUS at 08:13

## 2018-09-04 RX ADMIN — ALBUMIN (HUMAN) 250 ML: 12.5 SOLUTION INTRAVENOUS at 09:33

## 2018-09-04 RX ADMIN — FENTANYL CITRATE 50 MCG: 50 INJECTION INTRAMUSCULAR; INTRAVENOUS at 10:36

## 2018-09-04 RX ADMIN — MORPHINE SULFATE 4 MG: 4 INJECTION INTRAVENOUS at 22:49

## 2018-09-04 RX ADMIN — METRONIDAZOLE 500 MG: 500 INJECTION, SOLUTION INTRAVENOUS at 22:48

## 2018-09-04 RX ADMIN — FENTANYL CITRATE 100 MCG: 50 INJECTION INTRAMUSCULAR; INTRAVENOUS at 07:44

## 2018-09-04 RX ADMIN — EPHEDRINE SULFATE 10 MG: 50 INJECTION INTRAMUSCULAR; INTRAVENOUS; SUBCUTANEOUS at 07:46

## 2018-09-04 ASSESSMENT — PULMONARY FUNCTION TESTS
PIF_VALUE: 16
PIF_VALUE: 4
PIF_VALUE: 16
PIF_VALUE: 18
PIF_VALUE: 18
PIF_VALUE: 17
PIF_VALUE: 18
PIF_VALUE: 17
PIF_VALUE: 19
PIF_VALUE: 16
PIF_VALUE: 0
PIF_VALUE: 17
PIF_VALUE: 18
PIF_VALUE: 19
PIF_VALUE: 18
PIF_VALUE: 18
PIF_VALUE: 17
PIF_VALUE: 18
PIF_VALUE: 17
PIF_VALUE: 17
PIF_VALUE: 18
PIF_VALUE: 16
PIF_VALUE: 16
PIF_VALUE: 17
PIF_VALUE: 18
PIF_VALUE: 17
PIF_VALUE: 18
PIF_VALUE: 16
PIF_VALUE: 17
PIF_VALUE: 18
PIF_VALUE: 18
PIF_VALUE: 2
PIF_VALUE: 17
PIF_VALUE: 19
PIF_VALUE: 19
PIF_VALUE: 16
PIF_VALUE: 17
PIF_VALUE: 16
PIF_VALUE: 17
PIF_VALUE: 16
PIF_VALUE: 18
PIF_VALUE: 18
PIF_VALUE: 16
PIF_VALUE: 15
PIF_VALUE: 1
PIF_VALUE: 19
PIF_VALUE: 17
PIF_VALUE: 17
PIF_VALUE: 18
PIF_VALUE: 19
PIF_VALUE: 16
PIF_VALUE: 18
PIF_VALUE: 18
PIF_VALUE: 17
PIF_VALUE: 16
PIF_VALUE: 17
PIF_VALUE: 19
PIF_VALUE: 2
PIF_VALUE: 16
PIF_VALUE: 18
PIF_VALUE: 17
PIF_VALUE: 18
PIF_VALUE: 18
PIF_VALUE: 16
PIF_VALUE: 18
PIF_VALUE: 18
PIF_VALUE: 17
PIF_VALUE: 17
PIF_VALUE: 18
PIF_VALUE: 17
PIF_VALUE: 18
PIF_VALUE: 17
PIF_VALUE: 19
PIF_VALUE: 18
PIF_VALUE: 16
PIF_VALUE: 16
PIF_VALUE: 0
PIF_VALUE: 0
PIF_VALUE: 19
PIF_VALUE: 17
PIF_VALUE: 17
PIF_VALUE: 18
PIF_VALUE: 17
PIF_VALUE: 17
PIF_VALUE: 18
PIF_VALUE: 15
PIF_VALUE: 18
PIF_VALUE: 18
PIF_VALUE: 17
PIF_VALUE: 0
PIF_VALUE: 17
PIF_VALUE: 17
PIF_VALUE: 18
PIF_VALUE: 18
PIF_VALUE: 1
PIF_VALUE: 16
PIF_VALUE: 18
PIF_VALUE: 4
PIF_VALUE: 18
PIF_VALUE: 8
PIF_VALUE: 4
PIF_VALUE: 3
PIF_VALUE: 17
PIF_VALUE: 1
PIF_VALUE: 18
PIF_VALUE: 18
PIF_VALUE: 2
PIF_VALUE: 17
PIF_VALUE: 17
PIF_VALUE: 16
PIF_VALUE: 18
PIF_VALUE: 23
PIF_VALUE: 16
PIF_VALUE: 17
PIF_VALUE: 16
PIF_VALUE: 18
PIF_VALUE: 17
PIF_VALUE: 17
PIF_VALUE: 16
PIF_VALUE: 18
PIF_VALUE: 0
PIF_VALUE: 17
PIF_VALUE: 17
PIF_VALUE: 16
PIF_VALUE: 16
PIF_VALUE: 2
PIF_VALUE: 18
PIF_VALUE: 18
PIF_VALUE: 19
PIF_VALUE: 18
PIF_VALUE: 16
PIF_VALUE: 17
PIF_VALUE: 18
PIF_VALUE: 17
PIF_VALUE: 19
PIF_VALUE: 16
PIF_VALUE: 18
PIF_VALUE: 17
PIF_VALUE: 18
PIF_VALUE: 19
PIF_VALUE: 17
PIF_VALUE: 17
PIF_VALUE: 19
PIF_VALUE: 18
PIF_VALUE: 17
PIF_VALUE: 16
PIF_VALUE: 17
PIF_VALUE: 19
PIF_VALUE: 17
PIF_VALUE: 16
PIF_VALUE: 18
PIF_VALUE: 17
PIF_VALUE: 19
PIF_VALUE: 18
PIF_VALUE: 17
PIF_VALUE: 18
PIF_VALUE: 17
PIF_VALUE: 2
PIF_VALUE: 4
PIF_VALUE: 17
PIF_VALUE: 18
PIF_VALUE: 17
PIF_VALUE: 17
PIF_VALUE: 16
PIF_VALUE: 18

## 2018-09-04 ASSESSMENT — PAIN SCALES - GENERAL
PAINLEVEL_OUTOF10: 8
PAINLEVEL_OUTOF10: 0
PAINLEVEL_OUTOF10: 8
PAINLEVEL_OUTOF10: 7
PAINLEVEL_OUTOF10: 9
PAINLEVEL_OUTOF10: 9
PAINLEVEL_OUTOF10: 8

## 2018-09-04 ASSESSMENT — PAIN DESCRIPTION - PAIN TYPE
TYPE: SURGICAL PAIN

## 2018-09-04 ASSESSMENT — PAIN DESCRIPTION - LOCATION
LOCATION: ABDOMEN

## 2018-09-04 NOTE — PROGRESS NOTES
ADMITTED TO [facility] AND ORIENTED TO UNIT. SCDS ON. FALL AND ALLERGY BANDS ON.

## 2018-09-04 NOTE — ANESTHESIA POSTPROCEDURE EVALUATION
Department of Anesthesiology  Postprocedure Note    Patient: Niles Fowler  MRN: [id number]  YOB: 1933  Date of evaluation: 9/4/2018  Time:  12:51 PM     Procedure Summary     Date:  09/04/18 Room / Location:  [facility]    Anesthesia Start:  8494 Anesthesia Stop:  6645    Procedure:  COLOSTOMY REVERSAL AND PARASTOMAL HERNIA REPAIR (N/A ) Diagnosis:  (PARASTOMAL HERNIA, COLOSTOMY STATUS)    Surgeon:  Yaquelin Patel MD Responsible Provider:  Chiara Ann MD    Anesthesia Type:  general ASA Status:  4          Anesthesia Type: general    Lavonne Phase I: Lavonne Score: 9    Lavonne Phase II:      Last vitals: Reviewed and per EMR flowsheets. Anesthesia Post Evaluation   [address]  POST-ANESTHESIA NOTE       Name:  Niles Fowler                                         Age:  80 y.o.   MRN:  [id number]      Last Vitals:  /65   Pulse 69   Temp 98.8 °F (37.1 °C) (Oral)   Resp 14   Ht 5' 3\" (1.6 m)   Wt 152 lb (68.9 kg)   SpO2 95%   BMI 26.93 kg/m²   Patient Vitals for the past 4 hrs:   BP Temp Temp src Pulse Resp SpO2   09/04/18 1223 137/65 98.8 °F (37.1 °C) Oral 69 14 95 %   09/04/18 1215 (!) 146/63 - - 69 10 97 %   09/04/18 1210 (!) 149/68 - - 68 15 97 %   09/04/18 1205 (!) 155/66 - - 67 14 96 %   09/04/18 1150 (!) 159/69 - - 71 16 97 %   09/04/18 1145 (!) 157/70 - - 71 15 97 %   09/04/18 1140 (!) 165/72 - - 70 27 98 %   09/04/18 1135 (!) 165/71 - - 70 16 97 %   09/04/18 1130 (!) 162/71 - - 70 16 94 %   09/04/18 1125 (!) 159/80 - - 71 19 96 %   09/04/18 1120 - - - 75 26 97 %   09/04/18 1115 (!) 142/65 - - 77 19 97 %   09/04/18 1110 (!) 146/67 - - 69 15 98 %   09/04/18 1105 (!) 159/69 - - 69 15 98 %   09/04/18 1100 (!) 151/67 - - 66 14 98 %   09/04/18 1055 (!) 146/65 - - 65 13 98 %   09/04/18 1050 (!) 142/66 - - 65 17 96 %   09/04/18 1045 132/63 - - 72 15 96 %   09/04/18 1040 138/60 - - 79 26 96 %   09/04/18 1036 132/62 98 °F (36.7 °C) Temporal 88 26 93 %       Level of

## 2018-09-04 NOTE — PROGRESS NOTES
07/23/2018     Lab Results   Component Value Date    CREATININE 0.92 07/23/2018    BUN 21 (H) 07/23/2018     07/23/2018    K 4.4 09/04/2018     07/23/2018    CO2 30 07/23/2018     Lab Results   Component Value Date    CKTOTAL 41 10/16/2016     Lab Results   Component Value Date    ALT 16 07/23/2018    AST 22 07/23/2018    ALKPHOS 36 (L) 07/23/2018    BILITOT 0.4 07/23/2018     Lab Results   Component Value Date    LIPASE 33.9 06/01/2018         Electronically signed by Bobby Palencia MD on 9/4/18 at 6:33 PM                                                                                           Alexandra Michaud M.D.

## 2018-09-04 NOTE — OP NOTE
[address] [place] [address]                                 OPERATIVE REPORT    PATIENT NAME: Jerome Sutton                      :        1933  MED REC NO:   [id number]                           ROOM:       0028  ACCOUNT NO:   [de-identified]                           ADMIT DATE: 2018  PROVIDER:     Diana Winkler M.D.    DATE OF PROCEDURE:  2018    PREPROCEDURE DIAGNOSES:  Colostomy status, history of diverticular  stricture. POSTOPERATIVE DIAGNOSES:  Colostomy status, history of diverticular  stricture. PROCEDURES:  Open takedown colostomy with partial colectomy, lysis of small  bowel adhesions with small bowel densely adherent to rectal stump, small  bowel resection, low colo-pelvic anastomosis, descending colon to rectum. SURGEON:  Diana Winkler MD    ANESTHESIA:  General.    ESTIMATED BLOOD LOSS:  800 mL. INDICATIONS:  See preoperative history and physical examination. PROCEDURE DESCRIPTION:  The patient underwent a home mechanical bowel  preparation. She has opted to proceed with takedown of colostomy and fully  aware of the risks including death. The patient was brought the operating  room, placed supine on the operating table. She had pneumatic sequential  devices on lower extremities. She was given ciprofloxacin, Flagyl, and  Solu-Cortef intravenously. After induction of general anesthetic, she was  placed in [facility] stirrups. The prep was fair. Abdomen and perineum  were prepped and draped. Once this was completed, time-out was performed. A midline incision was made. The abdominal cavity was entered. Bookwalter  retractor was inserted. There were dense small bowel adhesions down to the  rectal stump. The liver could not be examined due to adhesions in the  right upper quadrant from prior open cholecystectomy. There was no  evidence of any malignancy.   There was a dense adhesion of a subcutaneous  tissues. Subcutaneous tissues were irrigated. Skin was also closed with  staples. Sponge, sharp, and instruments counts were correct. The patient  did have some rectal bleeding from instrumentation, so a Gelfoam was placed  per the rectum. Hemoglobin/hematocrit will be checked postoperatively. The patient did  require transfusion of 2 units of albumin in the operating suite. Blood  pressure on exiting the operating room was 140. She was spontaneously  breathing. Nessa Mckeon M.D.    D: 09/04/2018 11:04:26       T: 09/04/2018 11:06:04     [email or web address]  Job#: [id number]     Doc#: [id number]    CC: Jennifer Higginbotham. Raciel Pittman M.D.        Ezekiel Maravilla M.D.

## 2018-09-04 NOTE — H&P
[address] [address]  History and Physical Update    Pt Name: Yony Logan  MRN: [id number]  YOB: 1933  Date of evaluation: 9/4/2018    [x] I have examined the patient and reviewed the H&P/Consult and there are no changes to the patient or plans.     [] I have examined the patient and reviewed the H&P/Consult and have noted the following changes:        Erick Langford MD  Electronically signed 9/4/2018 at 7:14 AM

## 2018-09-04 NOTE — ANESTHESIA PRE PROCEDURE
Department of Anesthesiology  Preprocedure Note       Name:  Jerilyn Langston   Age:  80 y.o.  :  1933                                          MRN:  [id number]         Date:  2018      Surgeon: Portillo Hobbs):  Rikki Lucas MD    Procedure: Procedure(s):  COLOSTOMY REVERSAL AND PARASTOMAL HERNIA REPAIR    Medications prior to admission:   Prior to Admission medications    Medication Sig Start Date End Date Taking? Authorizing Provider   metroNIDAZOLE (FLAGYL) 500 MG tablet Take 1 tab at 4pm, 5pm and 11pm day prior to surgery.  18  Yes Rikki Lucas MD   ALPRAZolam Vonzella Deck) 0.5 MG tablet take 1 tablet by mouth three times a day if needed for sleep or anxiety 18 Yes Ricardo Thornton MD   levothyroxine (SYNTHROID) 88 MCG tablet Take 1 tablet by mouth Daily 18  Yes Ricardo Thornton MD   simvastatin (ZOCOR) 20 MG tablet take 1 tablet by mouth at bedtime 18  Yes Ricardo Thornton MD   pantoprazole (PROTONIX) 40 MG tablet take 1 tablet by mouth once daily 5/3/18  Yes Ricardo Thornton MD   RA LORATADINE 10 MG tablet take 1 tablet by mouth once daily 3/29/18  Yes Ricardo Thornton MD   sertraline (ZOLOFT) 50 MG tablet take 1 tablet by mouth twice a day 3/15/18  Yes Ricardo Thornton MD   isosorbide mononitrate (IMDUR) 30 MG extended release tablet take 1 tablet by mouth once daily 3/1/18  Yes Ricardo Thornton MD   OYSTER SHELL CALCIUM + D3 500-400 MG-UNIT TABS take 1 tablet by mouth twice a day 18  Yes Ricardo Thornton MD   clindamycin (CLEOCIN) 300 MG capsule Take 1 capsule by mouth 3 times daily 17  Yes Ricardo Thornton MD   HYDROcodone-acetaminophen [facility]) 5-325 MG per tablet take 1 tablet by mouth twice a day if needed 17  Yes Historical Provider, MD   potassium chloride (KLOR-CON) 10 MEQ extended release tablet Take 1 tablet by mouth daily 17  Yes Historical Provider, MD   aspirin (RA ASPIRIN ADULT LOW STRENGTH) 81 MG chewable tablet Take 1 tablet by

## 2018-09-04 NOTE — TELEPHONE ENCOUNTER
Date of last visit:  7/26/2018  Date of next visit:  9/27/2018    Requested Prescriptions     Pending Prescriptions Disp Refills    isosorbide mononitrate (IMDUR) 30 MG extended release tablet [Pharmacy Med Name: ISOSORBIDE MONONIT ER 30 MG TB] 45 tablet 3     Sig: take 1 tablet by mouth once daily

## 2018-09-05 LAB
ANION GAP SERPL CALCULATED.3IONS-SCNC: 10 MEQ/L (ref 8–16)
BASOPHILS # BLD: 0.1 %
BASOPHILS ABSOLUTE: 0 THOU/MM3 (ref 0–0.1)
BUN BLDV-MCNC: 16 MG/DL (ref 7–22)
CALCIUM SERPL-MCNC: 7.9 MG/DL (ref 8.5–10.5)
CHLORIDE BLD-SCNC: 106 MEQ/L (ref 98–111)
CO2: 25 MEQ/L (ref 23–33)
CREAT SERPL-MCNC: 0.8 MG/DL (ref 0.4–1.2)
EOSINOPHIL # BLD: 0 %
EOSINOPHILS ABSOLUTE: 0 THOU/MM3 (ref 0–0.4)
ERYTHROCYTE [DISTWIDTH] IN BLOOD BY AUTOMATED COUNT: 15.9 % (ref 11.5–14.5)
ERYTHROCYTE [DISTWIDTH] IN BLOOD BY AUTOMATED COUNT: 51.1 FL (ref 35–45)
GFR SERPL CREATININE-BSD FRML MDRD: 68 ML/MIN/1.73M2
GLUCOSE BLD-MCNC: 134 MG/DL (ref 70–108)
HCT VFR BLD CALC: 29.8 % (ref 37–47)
HEMOGLOBIN: 8.9 GM/DL (ref 12–16)
IMMATURE GRANS (ABS): 0.13 THOU/MM3 (ref 0–0.07)
IMMATURE GRANULOCYTES: 0.8 %
LYMPHOCYTES # BLD: 9.2 %
LYMPHOCYTES ABSOLUTE: 1.6 THOU/MM3 (ref 1–4.8)
MCH RBC QN AUTO: 26.6 PG (ref 26–33)
MCHC RBC AUTO-ENTMCNC: 29.9 GM/DL (ref 32.2–35.5)
MCV RBC AUTO: 89 FL (ref 81–99)
MONOCYTES # BLD: 7.1 %
MONOCYTES ABSOLUTE: 1.2 THOU/MM3 (ref 0.4–1.3)
NUCLEATED RED BLOOD CELLS: 0 /100 WBC
PLATELET # BLD: 174 THOU/MM3 (ref 130–400)
PMV BLD AUTO: 11.8 FL (ref 9.4–12.4)
POTASSIUM REFLEX MAGNESIUM: 4.8 MEQ/L (ref 3.5–5.2)
POTASSIUM REFLEX MAGNESIUM: 5.3 MEQ/L (ref 3.5–5.2)
RBC # BLD: 3.35 MILL/MM3 (ref 4.2–5.4)
SEG NEUTROPHILS: 82.8 %
SEGMENTED NEUTROPHILS ABSOLUTE COUNT: 14.3 THOU/MM3 (ref 1.8–7.7)
SODIUM BLD-SCNC: 141 MEQ/L (ref 135–145)
WBC # BLD: 17.3 THOU/MM3 (ref 4.8–10.8)

## 2018-09-05 PROCEDURE — 97530 THERAPEUTIC ACTIVITIES: CPT

## 2018-09-05 PROCEDURE — 99024 POSTOP FOLLOW-UP VISIT: CPT | Performed by: SURGERY

## 2018-09-05 PROCEDURE — 6360000002 HC RX W HCPCS: Performed by: SURGERY

## 2018-09-05 PROCEDURE — G8978 MOBILITY CURRENT STATUS: HCPCS

## 2018-09-05 PROCEDURE — 1200000003 HC TELEMETRY R&B

## 2018-09-05 PROCEDURE — 84132 ASSAY OF SERUM POTASSIUM: CPT

## 2018-09-05 PROCEDURE — 80048 BASIC METABOLIC PNL TOTAL CA: CPT

## 2018-09-05 PROCEDURE — 97161 PT EVAL LOW COMPLEX 20 MIN: CPT

## 2018-09-05 PROCEDURE — 36415 COLL VENOUS BLD VENIPUNCTURE: CPT

## 2018-09-05 PROCEDURE — 6370000000 HC RX 637 (ALT 250 FOR IP): Performed by: SURGERY

## 2018-09-05 PROCEDURE — 85025 COMPLETE CBC W/AUTO DIFF WBC: CPT

## 2018-09-05 PROCEDURE — 2580000003 HC RX 258: Performed by: SURGERY

## 2018-09-05 PROCEDURE — G8979 MOBILITY GOAL STATUS: HCPCS

## 2018-09-05 PROCEDURE — 2700000000 HC OXYGEN THERAPY PER DAY

## 2018-09-05 RX ORDER — CHOLECALCIFEROL (VITAMIN D3) 1250 MCG
50000 CAPSULE ORAL WEEKLY
Status: ON HOLD | COMMUNITY
End: 2018-12-23

## 2018-09-05 RX ADMIN — CALCIUM CARBONATE-VITAMIN D TAB 500 MG-200 UNIT 1 TABLET: 500-200 TAB at 09:15

## 2018-09-05 RX ADMIN — DEXTRAN 70 AND HYPROMELLOSE 2910 1 DROP: 1; 3 SOLUTION/ DROPS OPHTHALMIC at 09:15

## 2018-09-05 RX ADMIN — LEVOTHYROXINE SODIUM 88 MCG: 88 TABLET ORAL at 05:54

## 2018-09-05 RX ADMIN — MORPHINE SULFATE 4 MG: 4 INJECTION INTRAVENOUS at 05:55

## 2018-09-05 RX ADMIN — HYDROCODONE BITARTRATE AND ACETAMINOPHEN 2 TABLET: 5; 325 TABLET ORAL at 09:23

## 2018-09-05 RX ADMIN — ALVIMOPAN 12 MG: 12 CAPSULE ORAL at 19:27

## 2018-09-05 RX ADMIN — HYDROCODONE BITARTRATE AND ACETAMINOPHEN 2 TABLET: 5; 325 TABLET ORAL at 14:03

## 2018-09-05 RX ADMIN — SODIUM CHLORIDE: 9 INJECTION, SOLUTION INTRAVENOUS at 23:20

## 2018-09-05 RX ADMIN — SERTRALINE 50 MG: 50 TABLET, FILM COATED ORAL at 19:27

## 2018-09-05 RX ADMIN — DEXTRAN 70 AND HYPROMELLOSE 2910 1 DROP: 1; 3 SOLUTION/ DROPS OPHTHALMIC at 17:06

## 2018-09-05 RX ADMIN — HYDROCORTISONE SODIUM SUCCINATE 50 MG: 100 INJECTION, POWDER, FOR SOLUTION INTRAMUSCULAR; INTRAVENOUS at 21:45

## 2018-09-05 RX ADMIN — SERTRALINE 50 MG: 50 TABLET, FILM COATED ORAL at 09:15

## 2018-09-05 RX ADMIN — ALVIMOPAN 12 MG: 12 CAPSULE ORAL at 09:15

## 2018-09-05 RX ADMIN — DEXTRAN 70 AND HYPROMELLOSE 2910 1 DROP: 1; 3 SOLUTION/ DROPS OPHTHALMIC at 14:04

## 2018-09-05 RX ADMIN — HYDROCORTISONE SODIUM SUCCINATE 50 MG: 100 INJECTION, POWDER, FOR SOLUTION INTRAMUSCULAR; INTRAVENOUS at 05:54

## 2018-09-05 RX ADMIN — DEXTRAN 70 AND HYPROMELLOSE 2910 1 DROP: 1; 3 SOLUTION/ DROPS OPHTHALMIC at 21:45

## 2018-09-05 RX ADMIN — ISOSORBIDE MONONITRATE 30 MG: 30 TABLET ORAL at 09:15

## 2018-09-05 RX ADMIN — HYDROCORTISONE SODIUM SUCCINATE 50 MG: 100 INJECTION, POWDER, FOR SOLUTION INTRAMUSCULAR; INTRAVENOUS at 14:04

## 2018-09-05 RX ADMIN — HYDROCODONE BITARTRATE AND ACETAMINOPHEN 2 TABLET: 5; 325 TABLET ORAL at 21:45

## 2018-09-05 RX ADMIN — TIMOLOL MALEATE 1 DROP: 5 SOLUTION OPHTHALMIC at 21:45

## 2018-09-05 RX ADMIN — TIMOLOL MALEATE 1 DROP: 5 SOLUTION OPHTHALMIC at 09:15

## 2018-09-05 RX ADMIN — PANTOPRAZOLE SODIUM 40 MG: 40 TABLET, DELAYED RELEASE ORAL at 09:16

## 2018-09-05 RX ADMIN — Medication 10 ML: at 09:15

## 2018-09-05 ASSESSMENT — PAIN DESCRIPTION - LOCATION
LOCATION: ABDOMEN

## 2018-09-05 ASSESSMENT — PAIN SCALES - GENERAL
PAINLEVEL_OUTOF10: 7
PAINLEVEL_OUTOF10: 8
PAINLEVEL_OUTOF10: 6
PAINLEVEL_OUTOF10: 8
PAINLEVEL_OUTOF10: 6
PAINLEVEL_OUTOF10: 6
PAINLEVEL_OUTOF10: 5

## 2018-09-05 ASSESSMENT — PAIN DESCRIPTION - PAIN TYPE
TYPE: SURGICAL PAIN
TYPE: ACUTE PAIN;SURGICAL PAIN
TYPE: ACUTE PAIN;SURGICAL PAIN

## 2018-09-05 NOTE — CARE COORDINATION
9/5/18, 7:40 AM      Latanya Rod       Admitted from: PACU 9/4/2018/ [address] day: 1   Location: -28/028-A Reason for admit: Parastomal hernia without obstruction or gangrene [K43.5] Status: IP  Admit order signed?: yes  PMH:  has a past medical history of Anxiety; Blind right eye; Breast cancer metastasized to liver [facility]); Carotid stenosis; Colostomy in place [facility]); Degenerative arthritis of cervical spine; GERD (gastroesophageal reflux disease); Hypercholesteremia; Hypoestrogenism; Hypothyroidism; Lumbago; MDRO (multiple drug resistant organisms) resistance; Sigmoid diverticulosis; Spondylolisthesis of lumbosacral region; Steroid-induced hyperglycemia; Subclavian artery stenosis (HCC); SVT (supraventricular tachycardia) ([address] Temporal arteritis ([address] Vertebral artery occlusion; and Vitamin D deficiency. Procedure: Procedure(s): 9/4/18  COLOSTOMY REVERSAL AND PARASTOMAL HERNIA REPAIR, partial colectomy, small bowel resection     Pertinent abnormal Imaging:none  Medications:  Scheduled Meds:   isosorbide mononitrate  30 mg Oral Daily    levothyroxine  88 mcg Oral Daily    therapeutic multivitamin-minerals  1 tablet Oral Daily    calcium-vitamin D  1 tablet Oral Daily    pantoprazole  40 mg Oral Daily    sertraline  50 mg Oral BID    timolol  1 drop Right Eye BID    alvimopan  12 mg Oral BID    sodium chloride flush  10 mL Intravenous 2 times per day    hydrocortisone sodium succinate PF  50 mg Intravenous Q8H    dextran 70-hypromellose  1 drop Both Eyes 4x Daily     Continuous Infusions:   sodium chloride 125 mL/hr at 09/04/18 1232      Pertinent Info/Orders/Treatment Plan:  IV fluids, IV hydrocortisone, pain control,telemetry, wound drain management,PT/OT, incentive spirometry,abdominal binder. Diet: Diet NPO Effective Now Exceptions are: Sips with Meds, Ice Chips   DVT Prophylaxis: SCD's ordered and on  Smoking status:  reports that she has quit smoking.  Her smoking use included Cigarettes. She smoked 0.50 packs per day. She has never used smokeless tobacco.   Influenza Vaccination Screening Completed: yes  Pneumonia Vaccination Screening Completed: yes  PCP: Branden Flores MD  Readmission: no  Readmission Risk Score: 22%    Discharge Planning  Current Residence:  Private Residence  Living Arrangements:  Alone   Support Systems:  Children, Family Members  Current Services PTA:     Potential Assistance Needed:  Extended Care Facility  Potential Assistance Purchasing Medications:  No  Does patient want to participate in local refill/ meds to beds program?  No  Type of Home Care Services:  [facility]  Patient expects to be discharged to:  rehab  Expected Discharge date: Follow Up Appointment: Best Day/ Time: Monday AM    Discharge Plan:Met with Latanya. She currently lives at home alone. She has a walker and is current with [facility]. Plan for [facility] is to eventually return home but is requesting and additional rehab stay to build her strength. Appropriate consults made.      Evaluation: yes

## 2018-09-05 NOTE — PROGRESS NOTES
Family Medicine Progress Notes/Coverage    Today's Date: 9/5/18  6K-28/028-A  Medical Record # [id number]  Account # [de-identified]      Ms. Rod admitted on 9/4/2018        Subjective / Interval History :     Flatus last night  Sore on the incision site  Hungry , tolerating ice chips  Reviewed notes, consults, laboratory and radiology results,    Objective:       Physical Exam:  Patient Vitals for the past 24 hrs:   BP Temp Temp src Pulse Resp SpO2 Weight   09/05/18 0910 (!) 123/57 98 °F (36.7 °C) Oral 78 16 92 % -   09/05/18 0818 - - - - - 90 % -   09/05/18 0422 (!) 115/52 98 °F (36.7 °C) Oral 80 18 98 % 160 lb 9.6 oz (72.8 kg)   09/05/18 0023 (!) 112/56 - - 82 18 94 % -   09/04/18 2242 (!) 108/53 98 °F (36.7 °C) Oral 87 18 93 % -   09/04/18 1810 (!) 124/58 97.9 °F (36.6 °C) Oral 84 17 94 % -   09/04/18 1415 - - - - - 96 % -   09/04/18 1223 137/65 98.8 °F (37.1 °C) Oral 69 14 95 % -   09/04/18 1215 (!) 146/63 - - 69 10 97 % -   09/04/18 1210 (!) 149/68 - - 68 15 97 % -   09/04/18 1205 (!) 155/66 - - 67 14 96 % -   09/04/18 1150 (!) 159/69 - - 71 16 97 % -   09/04/18 1145 (!) 157/70 - - 71 15 97 % -   09/04/18 1140 (!) 165/72 - - 70 27 98 % -   09/04/18 1135 (!) 165/71 - - 70 16 97 % -   09/04/18 1130 (!) 162/71 - - 70 16 94 % -   09/04/18 1125 (!) 159/80 - - 71 19 96 % -   09/04/18 1120 - - - 75 26 97 % -   09/04/18 1115 (!) 142/65 - - 77 19 97 % -   09/04/18 1110 (!) 146/67 - - 69 15 98 % -   09/04/18 1105 (!) 159/69 - - 69 15 98 % -   09/04/18 1100 (!) 151/67 - - 66 14 98 % -   09/04/18 1055 (!) 146/65 - - 65 13 98 % -   09/04/18 1050 (!) 142/66 - - 65 17 96 % -   09/04/18 1045 132/63 - - 72 15 96 % -   09/04/18 1040 138/60 - - 79 26 96 % -   09/04/18 1036 132/62 98 °F (36.7 °C) Temporal 88 26 93 % -       General Appearance:  Alert, cooperative, no distress, appears stated

## 2018-09-05 NOTE — PROGRESS NOTES
limited by endurance; Patient limited by fatigue    Treatment Initiated: See exercises above and pt ambulated 20 ft with RW with close SBA and stood at sink to wash/dry hands with SBA      Assessment: Body structures, Functions, Activity limitations: Decreased functional mobility , Decreased endurance, Decreased strength  Assessment: Pt is 80 y.o. female that is s/p abdominal surgery to reverse a colostomy. Pt is motivated to get moving and participated well with the session. Pt lives alone and will need to be nearly Mod I to return. Pt is currently needing Min A for bed mobility and CGA otherwise. Pt would benefit from continued skilled PT to address mobility deficits prior to returning home. Prognosis: Excellent    Clinical Presentation: Low - Stable and Uncomplicated:      Decision Making: Moderate Complexity based on patient assessment and decision making process of determining plan of care and establishing reasonable expectations for measurable functional outcomes    REQUIRES PT FOLLOW UP: Yes  Discharge Recommendations: Continue to assess pending progress, Patient would benefit from continued therapy after discharge    Patient Education:  Patient Education: plan of care and LE exercises    Equipment Recommendations:  Equipment Needed: No    Safety:  Type of devices:  All fall risk precautions in place, Left in chair, Call light within reach, Chair alarm in place, Nurse notified, Gait belt, Patient at risk for falls    Plan:  Times per week: 5x GM  Times per day: Daily  Specific instructions for Next Treatment: ther ex, ther act, gait trng, functional mobility  Current Treatment Recommendations: Strengthening, Balance Training, Endurance Training, Functional Mobility Training, Transfer Training, Gait Training, Stair training, Patient/Caregiver Education & Training, Safety Education & Training    Goals:  Patient goals : go home  Short term goals  Time Frame for Short term goals: 3 days  Short term goal 1: Pt to

## 2018-09-05 NOTE — PROGRESS NOTES
Pharmacy Medication History Note      List of current medications patient is taking is complete. Source of information: patient, , daughter    Changes made to medication list:  Medications removed (include reason, ex. therapy complete or physician discontinued):  · Cephalexin 500 mg - therapy completed  · Clindamycin 300 mg - therapy completed  · Metronidazole 500 mg - therapy completed    Medications added/doses adjusted:  · Vitamin D3 50,000 units once weekly on Thursdays    Other notes (ex. Recent course of antibiotics, Coumadin dosing):  · Denies use of other OTC or herbal medications.     Electronically signed by Katherine Galvez on 9/5/2018 at 11:55 AM

## 2018-09-05 NOTE — PROGRESS NOTES
[facility]  Postoperative Progress Note    Pt Name: Sandra Nagy Record Number: [id number]  Date of Birth 5/29/1933   Today's Date: 9/5/2018    Cristóbal Phillips is doing well. Pain is well-controlled she is asking when she can eat. Blood pressure is good. Hemoglobin has dropped.   OBJECTIVE  VITALS: VITALS:  BP (!) 104/51   Pulse 74   Temp 97.9 °F (36.6 °C) (Oral)   Resp 16   Ht 5' 3\" (1.6 m)   Wt 160 lb 9.6 oz (72.8 kg)   SpO2 90%   BMI 28.45 kg/m²   GENERAL: alert, cooperative, no acute distress  LUNGS: clear to ausculation, without wheezes, rales or rhonci  HEART: normal rate  ABDOMEN: Soft expected incisional tenderness, dressing is dry  INCISION: clean, dry and intact  EXTERMITY: no edema  INTAKE/OUTPUT :   INTAKE/OUTPUT:    Intake/Output Summary (Last 24 hours) at 09/05/18 1800  Last data filed at 09/05/18 1605   Gross per 24 hour   Intake          2777.48 ml   Output              620 ml   Net          2157.48 ml        LABS  CBC with Differential:    Lab Results   Component Value Date    WBC 17.3 09/05/2018    RBC 3.35 09/05/2018    HGB 8.9 09/05/2018    HCT 29.8 09/05/2018     09/05/2018    MCV 89.0 09/05/2018    MCH 26.6 09/05/2018    MCHC 29.9 09/05/2018    RDW 17.4 07/23/2018    NRBC 0 09/05/2018    SEGSPCT 82.8 09/05/2018    LYMPHOPCT 28.5 07/23/2018    MONOPCT 7.1 09/05/2018    MONOPCT 9.2 07/23/2018    EOSPCT 1.7 07/23/2018    BASOPCT 0.2 07/23/2018    MONOSABS 1.2 09/05/2018    LYMPHSABS 1.6 09/05/2018    EOSABS 0.0 09/05/2018    BASOSABS 0.0 09/05/2018     CMP:    Lab Results   Component Value Date     09/05/2018    K 4.8 09/05/2018     09/05/2018    CO2 25 09/05/2018    BUN 16 09/05/2018    CREATININE 0.8 09/05/2018    AGRATIO 1.7 07/23/2018    LABGLOM 68 09/05/2018    GLUCOSE 134 09/05/2018    GLUCOSE 103 07/23/2018    PROT 6.2 07/23/2018    LABALBU 3.9 07/23/2018    CALCIUM 7.9 09/05/2018    BILITOT 0.4

## 2018-09-06 LAB
ANION GAP SERPL CALCULATED.3IONS-SCNC: 8 MEQ/L (ref 8–16)
BUN BLDV-MCNC: 15 MG/DL (ref 7–22)
CALCIUM SERPL-MCNC: 7.9 MG/DL (ref 8.5–10.5)
CHLORIDE BLD-SCNC: 110 MEQ/L (ref 98–111)
CO2: 23 MEQ/L (ref 23–33)
CREAT SERPL-MCNC: 0.7 MG/DL (ref 0.4–1.2)
ERYTHROCYTE [DISTWIDTH] IN BLOOD BY AUTOMATED COUNT: 16.1 % (ref 11.5–14.5)
ERYTHROCYTE [DISTWIDTH] IN BLOOD BY AUTOMATED COUNT: 52 FL (ref 35–45)
GFR SERPL CREATININE-BSD FRML MDRD: 79 ML/MIN/1.73M2
GLUCOSE BLD-MCNC: 88 MG/DL (ref 70–108)
HCT VFR BLD CALC: 28.9 % (ref 37–47)
HEMOGLOBIN: 8.6 GM/DL (ref 12–16)
MCH RBC QN AUTO: 26.4 PG (ref 26–33)
MCHC RBC AUTO-ENTMCNC: 29.8 GM/DL (ref 32.2–35.5)
MCV RBC AUTO: 88.7 FL (ref 81–99)
PLATELET # BLD: 178 THOU/MM3 (ref 130–400)
PMV BLD AUTO: 12.1 FL (ref 9.4–12.4)
POTASSIUM SERPL-SCNC: 4.2 MEQ/L (ref 3.5–5.2)
RBC # BLD: 3.26 MILL/MM3 (ref 4.2–5.4)
SODIUM BLD-SCNC: 141 MEQ/L (ref 135–145)
WBC # BLD: 14.9 THOU/MM3 (ref 4.8–10.8)

## 2018-09-06 PROCEDURE — 2580000003 HC RX 258: Performed by: SURGERY

## 2018-09-06 PROCEDURE — 97166 OT EVAL MOD COMPLEX 45 MIN: CPT

## 2018-09-06 PROCEDURE — 97110 THERAPEUTIC EXERCISES: CPT

## 2018-09-06 PROCEDURE — 6370000000 HC RX 637 (ALT 250 FOR IP): Performed by: SURGERY

## 2018-09-06 PROCEDURE — 85027 COMPLETE CBC AUTOMATED: CPT

## 2018-09-06 PROCEDURE — 36415 COLL VENOUS BLD VENIPUNCTURE: CPT

## 2018-09-06 PROCEDURE — G8988 SELF CARE GOAL STATUS: HCPCS

## 2018-09-06 PROCEDURE — G8987 SELF CARE CURRENT STATUS: HCPCS

## 2018-09-06 PROCEDURE — 51798 US URINE CAPACITY MEASURE: CPT

## 2018-09-06 PROCEDURE — 6360000002 HC RX W HCPCS: Performed by: SURGERY

## 2018-09-06 PROCEDURE — 97535 SELF CARE MNGMENT TRAINING: CPT

## 2018-09-06 PROCEDURE — 1200000003 HC TELEMETRY R&B

## 2018-09-06 PROCEDURE — 51702 INSERT TEMP BLADDER CATH: CPT

## 2018-09-06 PROCEDURE — 80048 BASIC METABOLIC PNL TOTAL CA: CPT

## 2018-09-06 PROCEDURE — 97116 GAIT TRAINING THERAPY: CPT

## 2018-09-06 PROCEDURE — 99024 POSTOP FOLLOW-UP VISIT: CPT | Performed by: SURGERY

## 2018-09-06 RX ORDER — FUROSEMIDE 10 MG/ML
20 INJECTION INTRAMUSCULAR; INTRAVENOUS ONCE
Status: COMPLETED | OUTPATIENT
Start: 2018-09-06 | End: 2018-09-06

## 2018-09-06 RX ADMIN — DEXTRAN 70 AND HYPROMELLOSE 2910 1 DROP: 1; 3 SOLUTION/ DROPS OPHTHALMIC at 21:08

## 2018-09-06 RX ADMIN — DEXTRAN 70 AND HYPROMELLOSE 2910 1 DROP: 1; 3 SOLUTION/ DROPS OPHTHALMIC at 08:26

## 2018-09-06 RX ADMIN — SERTRALINE 50 MG: 50 TABLET, FILM COATED ORAL at 21:08

## 2018-09-06 RX ADMIN — SERTRALINE 50 MG: 50 TABLET, FILM COATED ORAL at 08:26

## 2018-09-06 RX ADMIN — ALVIMOPAN 12 MG: 12 CAPSULE ORAL at 21:08

## 2018-09-06 RX ADMIN — HYDROCODONE BITARTRATE AND ACETAMINOPHEN 2 TABLET: 5; 325 TABLET ORAL at 21:08

## 2018-09-06 RX ADMIN — DEXTRAN 70 AND HYPROMELLOSE 2910 1 DROP: 1; 3 SOLUTION/ DROPS OPHTHALMIC at 17:11

## 2018-09-06 RX ADMIN — ENOXAPARIN SODIUM 40 MG: 40 INJECTION SUBCUTANEOUS at 08:26

## 2018-09-06 RX ADMIN — HYDROCODONE BITARTRATE AND ACETAMINOPHEN 2 TABLET: 5; 325 TABLET ORAL at 15:11

## 2018-09-06 RX ADMIN — FUROSEMIDE 20 MG: 10 INJECTION, SOLUTION INTRAMUSCULAR; INTRAVENOUS at 08:26

## 2018-09-06 RX ADMIN — HYDROCORTISONE SODIUM SUCCINATE 50 MG: 100 INJECTION, POWDER, FOR SOLUTION INTRAMUSCULAR; INTRAVENOUS at 21:08

## 2018-09-06 RX ADMIN — MULTIPLE VITAMINS W/ MINERALS TAB 1 TABLET: TAB at 08:26

## 2018-09-06 RX ADMIN — TIMOLOL MALEATE 1 DROP: 5 SOLUTION OPHTHALMIC at 08:26

## 2018-09-06 RX ADMIN — PANTOPRAZOLE SODIUM 40 MG: 40 TABLET, DELAYED RELEASE ORAL at 08:26

## 2018-09-06 RX ADMIN — DEXTRAN 70 AND HYPROMELLOSE 2910 1 DROP: 1; 3 SOLUTION/ DROPS OPHTHALMIC at 11:23

## 2018-09-06 RX ADMIN — CALCIUM CARBONATE-VITAMIN D TAB 500 MG-200 UNIT 1 TABLET: 500-200 TAB at 08:26

## 2018-09-06 RX ADMIN — TIMOLOL MALEATE 1 DROP: 5 SOLUTION OPHTHALMIC at 21:08

## 2018-09-06 RX ADMIN — LEVOTHYROXINE SODIUM 88 MCG: 88 TABLET ORAL at 05:41

## 2018-09-06 RX ADMIN — HYDROCORTISONE SODIUM SUCCINATE 50 MG: 100 INJECTION, POWDER, FOR SOLUTION INTRAMUSCULAR; INTRAVENOUS at 05:41

## 2018-09-06 RX ADMIN — Medication 10 ML: at 08:27

## 2018-09-06 RX ADMIN — HYDROCODONE BITARTRATE AND ACETAMINOPHEN 2 TABLET: 5; 325 TABLET ORAL at 07:49

## 2018-09-06 RX ADMIN — ISOSORBIDE MONONITRATE 30 MG: 30 TABLET ORAL at 08:26

## 2018-09-06 RX ADMIN — ALVIMOPAN 12 MG: 12 CAPSULE ORAL at 08:26

## 2018-09-06 RX ADMIN — ONDANSETRON 4 MG: 2 INJECTION INTRAMUSCULAR; INTRAVENOUS at 20:49

## 2018-09-06 ASSESSMENT — PAIN SCALES - GENERAL
PAINLEVEL_OUTOF10: 6
PAINLEVEL_OUTOF10: 8
PAINLEVEL_OUTOF10: 5
PAINLEVEL_OUTOF10: 6
PAINLEVEL_OUTOF10: 8
PAINLEVEL_OUTOF10: 8
PAINLEVEL_OUTOF10: 5

## 2018-09-06 ASSESSMENT — PAIN DESCRIPTION - LOCATION: LOCATION: ABDOMEN

## 2018-09-06 ASSESSMENT — PAIN DESCRIPTION - PAIN TYPE: TYPE: SURGICAL PAIN;ACUTE PAIN

## 2018-09-06 NOTE — PROGRESS NOTES
(OSCAL-500) 500-200 MG-UNIT per tablet 1 tablet  1 tablet Oral Daily Best Hand MD   1 tablet at 09/06/18 0826    pantoprazole (PROTONIX) tablet 40 mg  40 mg Oral Daily Best Hand MD   40 mg at 09/06/18 0826    sertraline (ZOLOFT) tablet 50 mg  50 mg Oral BID Best Hand MD   50 mg at 09/06/18 0826    timolol (TIMOPTIC) 0.5 % ophthalmic solution 1 drop  1 drop Right Eye BID Best Hand MD   1 drop at 09/06/18 9370    alvimopan (ENTEREG) capsule 12 mg  12 mg Oral BID Best Hand MD   12 mg at 09/06/18 0826    0.9 % sodium chloride infusion   Intravenous Continuous Best Hand MD 75 mL/hr at 09/06/18 0748      sodium chloride flush 0.9 % injection 10 mL  10 mL Intravenous 2 times per day Best Hand MD   10 mL at 09/06/18 0827    sodium chloride flush 0.9 % injection 10 mL  10 mL Intravenous PRN Best Hand MD        acetaminophen (TYLENOL) tablet 650 mg  650 mg Oral Q4H PRN Best Hand MD        ondansetron [facility]) injection 4 mg  4 mg Intravenous Q6H PRN Best Hand MD        HYDROcodone-acetaminophen [facility]) 5-325 MG per tablet 1 tablet  1 tablet Oral Q4H PRN Best Hand MD        Or    HYDROcodone-acetaminophen [facility]) 5-325 MG per tablet 2 tablet  2 tablet Oral Q4H PRN Best Hand MD   2 tablet at 09/06/18 0749    morphine injection 2 mg  2 mg Intravenous Q2H PRN Best Hand MD        Or   Jennifer Ozark morphine (PF) injection 4 mg  4 mg Intravenous Q2H PRN Best Hand MD   4 mg at 09/05/18 0555    dextran 70-hypromellose (TEARS NATURALE) 0.1-0.3 % opthalmic solution 1 drop  1 drop Both Eyes 4x Daily Best Hand MD   1 drop at 09/06/18 1123     LABS  CBC with Differential:    Lab Results   Component Value Date    WBC 14.9 09/06/2018    RBC 3.26 09/06/2018    HGB 8.6 09/06/2018    HCT 28.9 09/06/2018     09/06/2018    MCV 88.7 09/06/2018    MCH 26.4 09/06/2018    MCHC 29.8 09/06/2018    RDW 17.4 07/23/2018    NRBC 0 09/05/2018    SEGSPCT 82.8 09/05/2018    LYMPHOPCT 28.5 07/23/2018

## 2018-09-06 NOTE — PROGRESS NOTES
Pt straight cathed per instructions from Dr Patrick Van after many attempts by pt to void without success. Bladder scan only showing 56ml but likely innacurrate because of surgical incisions. Only 50ml returned from straight cath.

## 2018-09-06 NOTE — FLOWSHEET NOTE
Prayer and encouragement      09/06/18 1721   Encounter Summary   Services provided to: Patient   [facility]   Support System Spouse   Continue Visiting Yes  (9/6 )   Complexity of Encounter Low   Length of Encounter 15 minutes   Spiritual/Scientologist   Type Spiritual support   Assessment Approachable;Calm;Peaceful   Intervention Prayer;Nurtured hope; Active listening;Empowerment   Outcome Connection/belonging;Expressed gratitude;Encouraged

## 2018-09-06 NOTE — PLAN OF CARE
Problem: DISCHARGE BARRIERS  Goal: Patient's continuum of care needs are met  Outcome: Ongoing  SW assessment completed, TCU.

## 2018-09-06 NOTE — PROGRESS NOTES
Family Medicine Progress Notes/Coverage    Today's Date: 9/6/18  6K-28/028-A  Medical Record # [id number]  Account # [de-identified]      Ms. Rod admitted on 9/4/2018        Subjective / Interval History :     + small BM yesterday and + flatus  hungry  Reviewed notes, consults, laboratory and radiology results,    Objective:       Physical Exam:  Patient Vitals for the past 24 hrs:   BP Temp Temp src Pulse Resp SpO2 Weight   09/06/18 0333 122/63 98 °F (36.7 °C) Oral 76 16 95 % 161 lb 12.8 oz (73.4 kg)   09/05/18 1918 (!) 124/59 98.6 °F (37 °C) Oral 77 16 94 % -   09/05/18 1605 (!) 104/51 97.9 °F (36.6 °C) Oral 74 16 90 % -   09/05/18 1201 (!) 112/53 99.2 °F (37.3 °C) Oral 79 16 (!) 88 % -   09/05/18 0910 (!) 123/57 98 °F (36.7 °C) Oral 78 16 92 % -   09/05/18 0818 - - - - - 90 % -       General Appearance:  Alert, cooperative, no distress, appears stated age   HEENT:  Neck: wnl  supple   Chest/Lungs:  Heart: CTA  RRR   Abdomen:  Back:    Extremities:  Neurological Exam: No edema  WNL       Assessment:     Admitting Diagnosis:    Parastomal hernia without obstruction or gangrene [K43.5]    Active Hospital Problems    Diagnosis Date Noted    Parastomal hernia without obstruction or gangrene [K43.5] 09/04/2018     Priority: High    Diverticular stricture ([facility] [address] [K56.699]      Priority: High    Adhesion of intestine [K66.0]      Priority: High    Current chronic use of systemic steroids [Z79.52] 09/04/2018     Priority: Medium    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]      Priority: Medium    Steroid-induced hyperglycemia [R73.9, T38.0X5A] 04/19/2016     Priority: Medium    Hypothyroidism due to acquired atrophy of thyroid [E03.4] 03/03/2016     Priority: Medium    GERD (gastroesophageal reflux disease) [K21.9]      Priority: Medium       Plan:     Discussed plans with the nursing staff  Encourage ambulation    Medications, Laboratories and Imaging results:    Scheduled Meds:   enoxaparin  40 mg Subcutaneous Daily    hydrocortisone sodium succinate PF  50 mg Intravenous Q12H    furosemide  20 mg Intravenous Once    isosorbide mononitrate  30 mg Oral Daily    levothyroxine  88 mcg Oral Daily    therapeutic multivitamin-minerals  1 tablet Oral Daily    calcium-vitamin D  1 tablet Oral Daily    pantoprazole  40 mg Oral Daily    sertraline  50 mg Oral BID    timolol  1 drop Right Eye BID    alvimopan  12 mg Oral BID    sodium chloride flush  10 mL Intravenous 2 times per day    dextran 70-hypromellose  1 drop Both Eyes 4x Daily     Continuous Infusions:   sodium chloride 75 mL/hr at 09/06/18 0748     PRN Meds:albuterol, ALPRAZolam, ondansetron, sodium chloride flush, acetaminophen, ondansetron, HYDROcodone 5 mg - acetaminophen **OR** HYDROcodone 5 mg - acetaminophen, morphine **OR** morphine    Imaging:    Lab Review :    Lab Results   Component Value Date    WBC 14.9 (H) 09/06/2018    HGB 8.6 (L) 09/06/2018    HCT 28.9 (L) 09/06/2018    MCV 88.7 09/06/2018     09/06/2018     Lab Results   Component Value Date    CREATININE 0.7 09/06/2018    BUN 15 09/06/2018     09/06/2018    K 4.2 09/06/2018     09/06/2018    CO2 23 09/06/2018     Lab Results   Component Value Date    CKTOTAL 41 10/16/2016     Lab Results   Component Value Date    ALT 16 07/23/2018    AST 22 07/23/2018    ALKPHOS 36 (L) 07/23/2018    BILITOT 0.4 07/23/2018     Lab Results   Component Value Date    LIPASE 33.9 06/01/2018         Electronically signed by Zulma Valles MD on 9/6/18 at 8:02 AM                                                                                           Deshawn Aj M.D.

## 2018-09-06 NOTE — PROGRESS NOTES
PARASTOMAL HERNIA REPAIR performed by Melida Gauthier MD at [address] / [facility]  2002    UPPER GASTROINTESTINAL ENDOSCOPY  11/06           Subjective  Chart Reviewed: Yes (orders, progress notes)  Patient assessed for rehabilitation services?: Yes  Family / Caregiver Present: Yes    Subjective: cooperative, \"I'll try\"    General:       Vision: Impaired (R eye blindness)    Hearing: Within functional limits         Pain:  Pain Assessment  Patient Currently in Pain: Yes (incision)  Pain Level: 5       Social/Functional History:  Lives With: Alone  Type of Home: House  Home Layout: One level  Home Access: Stairs to enter with rails  Entrance Stairs - Number of Steps: 2 steps with 1 rail  Home Equipment: 4 wheeled walker, Rolling walker, Cane, Lift chair     Bathroom Shower/Tub: Tub/Shower unit  Bathroom Toilet: Standard  Bathroom Equipment: Shower chair, Grab bars in shower    Receives Help From: Family, Friend(s)  ADL Assistance: Needs assistance (A for dressing & bathing)  Homemaking Assistance: Needs assistance       Ambulation Assistance: Independent  Transfer Assistance: Independent          Additional Comments: Pt reports using a walker or cane to get around at home PLOF. Pt reports she has an aide 5x week that A with bathing & IADLs    Objective        Overall Cognitive Status: WNL         Sensation  Overall Sensation Status: WNL                LUE AROM (degrees)  LUE AROM : WNL          RUE AROM (degrees)  RUE AROM : WNL       LUE Strength  Gross LUE Strength:  (NT d/t abdominal incision)           RUE Strength  Gross RUE Strength:  (NT d/t abdominal incision)      ADL  LE Dressing: Dependent/Total (for adjusting slipper socks)     Bed mobility  Supine to Sit: Moderate assistance  Sit to Supine:  Moderate assistance  Comment: education provided on log rolling technique    Transfers  Sit to stand: Contact guard assistance  Stand to sit: Contact guard assistance    Balance  Sitting Balance: place    Plan:  Times per week: 5x  Current Treatment Recommendations: Balance Training, Functional Mobility Training, Endurance Training, Safety Education & Training, Self-Care / ADL    Goals:  Patient goals : get my back better (therapist was telling Pt to stand tall throughout session to prevent back pain from leaning forward)    Short term goals  Time Frame for Short term goals: 2 weeks  Short term goal 1: Complete 3-4 min standing ADL with S for increased ease of sinkside grooming  Short term goal 2: Complete various t/fs including toilet with S & 0 vcs for safety/technique  Short term goal 3: Complete mobility to/from bathroom & in hallway with S, RW, & 0-2 vcs for walker safety & posture  Short term goal 4: Complete LE dressing with min A & LH AE prn  Long term goals  Time Frame for Long term goals : No LTG set d/t short ELOS    Evaluation Complexity: Based on the findings of patient history, examination, clinical presentation, and decision making during this evaluation, this patient is of medium complexity. OT G-codes  Functional Limitation: Self care  Self Care Current Status (): At least 60 percent but less than 80 percent impaired, limited or restricted  Self Care Goal Status ():  At least 60 percent but less than 80 percent impaired, limited or restricted  AM-PAC Inpatient Daily Activity Raw Score: 13  AM-PAC Inpatient ADL T-Scale Score : 32.03  ADL Inpatient CMS 0-100% Score: 63.03  ADL Inpatient CMS G-Code Modifier : CL

## 2018-09-06 NOTE — PROGRESS NOTES
[address] [address]  INPATIENT PHYSICAL THERAPY  DAILYNOTE  [facility] RENAL TELEMETRY 6K - 6K-28/028-A    Time In: 2875  Time Out: 3031  Timed Code Treatment Minutes: 25 Minutes  Minutes: 25          Date: 2018  Patient Name: Niles Fowler,  Gender:  female        MRN: [id number]  : 1933  (80 y.o.)     Referring Practitioner: Gilford Revering, MD  Diagnosis: Parastomal hernia without obstruction or gangrene  Additional Pertinent Hx: Pt admitted for COLOSTOMY REVERSAL AND PARASTOMAL HERNIA REPAIR, partial colectomy, small bowel resection by Dr. Colin Deluna.      Past Medical History:   Diagnosis Date    Anxiety     Blind right eye     Breast cancer metastasized to liver ([facility] [address] 05/10/2016    Liver lesion noted     Carotid stenosis      Left ICA 25%    Colostomy in place [facility])     Degenerative arthritis of cervical spine     GERD (gastroesophageal reflux disease)     Hypercholesteremia     Hypoestrogenism     Hypothyroidism     Lumbago     MDRO (multiple drug resistant organisms) resistance     pt stated cleared    Sigmoid diverticulosis     Spondylolisthesis of lumbosacral region     Steroid-induced hyperglycemia     Subclavian artery stenosis (HCC)     left    SVT (supraventricular tachycardia) ([facility] [address]     Temporal arteritis ([facility] [address]     Vertebral artery occlusion     left    Vitamin D deficiency 2017     Past Surgical History:   Procedure Laterality Date    CARDIAC CATHETERIZATION      CATARACT REMOVAL  right ; left     CHOLECYSTECTOMY      COLONOSCOPY      DILATION AND CURETTAGE OF UTERUS      EYE SURGERY      EYE SURGERY Right 2017    Dr Mccall in [address]      partial    OTHER SURGICAL HISTORY  16    robotic assisted laparoscopic anterior resection    CT OFFICE/OUTPT VISIT,PROCEDURE ONLY N/A 2018    COLOSTOMY REVERSAL AND PARASTOMAL HERNIA REPAIR performed by Yaquelin Patel MD at [address] / PULMONARY [facility]UN[facility]  2002    UPPER GASTROINTESTINAL ENDOSCOPY  11/06       Restrictions/Precautions:  General Precautions, Fall Risk                    Other position/activity restrictions: Abdominal binder       Prior Level of Function:  Ambulation Assistance: Independent  Transfer Assistance: Independent       Subjective:     Subjective: RN approved therapy session. Pt. seated in BS chair upon arrival and pleasantly agrees to therapy session. Pt. reports fatigue and with difficutly staying awake throughout session. Pain:  Yes. Pain Assessment  Pain Assessment: 0-10  Pain Level: 8  Pain Type: Surgical pain;Acute pain  Pain Location: Abdomen       Social/Functional:  Lives With: Alone  Type of Home: House  Home Layout: One level  Home Access: Stairs to enter with rails  Entrance Stairs - Number of Steps: 2 steps with 1 rail  Home Equipment: 4 wheeled walker, Rolling walker, Cane     Objective:       Transfers  Sit to Stand:  (Cues for hand placement)  Stand to sit: Contact guard assistance       Ambulation 1  Surface: level tile  Device: Rolling Walker  Other Apparatus: O2  Assistance: Contact guard assistance  Quality of Gait: Slow sivan and velocity. Short step length with decreased heel strike and foot clearance. Slight forward flexed posture. Pt. reports nausea and light-headedness during ambulation. Pt. slightly unsteady but with no LOB. Distance: ~5' fwd/retro         Exercises:  Exercises  Comments: Pt. completed seated B LE ther ex 15x each consisting of marches, long arc quads, heel/toe raises, hip abd/add and glute sets all to increase strength and improve functional mobility. Pt. required cues throughout to stay on task as pt. had difficulty staying awake. Activity Tolerance:  Activity Tolerance: Patient Tolerated treatment well;Patient limited by endurance; Patient limited by fatigue    Assessment:   Body structures, Functions, Activity limitations: Decreased functional mobility , Decreased endurance, Decreased strength  Assessment: Pt. tolerated session fairly. Pt. pleasant throughout session but fatigued. Pt. limited by abdominal pain. Pt. would benefit from continued skilled PT to increase strength and endurance to enhance functional mobility. Prognosis: Excellent     REQUIRES PT FOLLOW UP: Yes  Discharge Recommendations: Continue to assess pending progress, Patient would benefit from continued therapy after discharge    Patient Education:  Patient Education: plan of care, posture, transfers, LE exercises    Equipment Recommendations:  Equipment Needed: No    Safety:  Type of devices:  All fall risk precautions in place, Left in chair, Call light within reach, Chair alarm in place, Nurse notified, Gait belt, Patient at risk for falls    Plan:  Times per week: 5x GM  Times per day: Daily  Specific instructions for Next Treatment: ther ex, ther act, gait trng, functional mobility  Current Treatment Recommendations: Strengthening, Balance Training, Endurance Training, Functional Mobility Training, Transfer Training, Gait Training, Stair training, Patient/Caregiver Education & Training, Safety Education & Training    Goals:  Patient goals : go home    Short term goals  Time Frame for Short term goals: 3 days  Short term goal 1: Pt to be Mod I for supine <> sit to get in/out of bed  Short term goal 2: Pt to be Mod I for sit <> stand to get  up to ambulate  Short term goal 3: Pt to ambulate > 50 ft with RW with Supervision for household distances  Short term goal 4: Pt to negotiate 2 steps with 1 rail with SBA for home access    Long term goals  Time Frame for Long term goals : not set due to short ELOS            AM-PAC Inpatient Mobility without Stair Climbing Raw Score : 15  AM-PAC Inpatient without Stair Climbing T-Scale Score : 43.03  Mobility Inpatient CMS 0-100% Score: 47.43  Mobility Inpatient without Stair CMS G-Code Modifier : RAOUL

## 2018-09-07 LAB
ANION GAP SERPL CALCULATED.3IONS-SCNC: 7 MEQ/L (ref 8–16)
BUN BLDV-MCNC: 16 MG/DL (ref 7–22)
CALCIUM SERPL-MCNC: 7.9 MG/DL (ref 8.5–10.5)
CHLORIDE BLD-SCNC: 110 MEQ/L (ref 98–111)
CO2: 23 MEQ/L (ref 23–33)
CREAT SERPL-MCNC: 0.6 MG/DL (ref 0.4–1.2)
ERYTHROCYTE [DISTWIDTH] IN BLOOD BY AUTOMATED COUNT: 16 % (ref 11.5–14.5)
ERYTHROCYTE [DISTWIDTH] IN BLOOD BY AUTOMATED COUNT: 53.7 FL (ref 35–45)
GFR SERPL CREATININE-BSD FRML MDRD: > 90 ML/MIN/1.73M2
GLUCOSE BLD-MCNC: 128 MG/DL (ref 70–108)
HCT VFR BLD CALC: 27.3 % (ref 37–47)
HEMOGLOBIN: 7.9 GM/DL (ref 12–16)
MCH RBC QN AUTO: 26.3 PG (ref 26–33)
MCHC RBC AUTO-ENTMCNC: 28.9 GM/DL (ref 32.2–35.5)
MCV RBC AUTO: 91 FL (ref 81–99)
PLATELET # BLD: 154 THOU/MM3 (ref 130–400)
PMV BLD AUTO: 11.9 FL (ref 9.4–12.4)
POTASSIUM SERPL-SCNC: 4.2 MEQ/L (ref 3.5–5.2)
RBC # BLD: 3 MILL/MM3 (ref 4.2–5.4)
SODIUM BLD-SCNC: 140 MEQ/L (ref 135–145)
WBC # BLD: 11 THOU/MM3 (ref 4.8–10.8)

## 2018-09-07 PROCEDURE — 82570 ASSAY OF URINE CREATININE: CPT

## 2018-09-07 PROCEDURE — 97530 THERAPEUTIC ACTIVITIES: CPT

## 2018-09-07 PROCEDURE — 94760 N-INVAS EAR/PLS OXIMETRY 1: CPT

## 2018-09-07 PROCEDURE — 6370000000 HC RX 637 (ALT 250 FOR IP): Performed by: SURGERY

## 2018-09-07 PROCEDURE — 2700000000 HC OXYGEN THERAPY PER DAY

## 2018-09-07 PROCEDURE — 97110 THERAPEUTIC EXERCISES: CPT

## 2018-09-07 PROCEDURE — 36415 COLL VENOUS BLD VENIPUNCTURE: CPT

## 2018-09-07 PROCEDURE — 85027 COMPLETE CBC AUTOMATED: CPT

## 2018-09-07 PROCEDURE — 1200000003 HC TELEMETRY R&B

## 2018-09-07 PROCEDURE — 99024 POSTOP FOLLOW-UP VISIT: CPT | Performed by: SURGERY

## 2018-09-07 PROCEDURE — 6360000002 HC RX W HCPCS: Performed by: SURGERY

## 2018-09-07 PROCEDURE — 80048 BASIC METABOLIC PNL TOTAL CA: CPT

## 2018-09-07 PROCEDURE — 97535 SELF CARE MNGMENT TRAINING: CPT

## 2018-09-07 PROCEDURE — 2580000003 HC RX 258: Performed by: SURGERY

## 2018-09-07 RX ADMIN — SERTRALINE 50 MG: 50 TABLET, FILM COATED ORAL at 09:02

## 2018-09-07 RX ADMIN — DEXTRAN 70 AND HYPROMELLOSE 2910 1 DROP: 1; 3 SOLUTION/ DROPS OPHTHALMIC at 12:08

## 2018-09-07 RX ADMIN — Medication 10 ML: at 21:55

## 2018-09-07 RX ADMIN — ISOSORBIDE MONONITRATE 30 MG: 30 TABLET ORAL at 09:02

## 2018-09-07 RX ADMIN — DEXTRAN 70 AND HYPROMELLOSE 2910 1 DROP: 1; 3 SOLUTION/ DROPS OPHTHALMIC at 21:54

## 2018-09-07 RX ADMIN — PANTOPRAZOLE SODIUM 40 MG: 40 TABLET, DELAYED RELEASE ORAL at 09:02

## 2018-09-07 RX ADMIN — TIMOLOL MALEATE 1 DROP: 5 SOLUTION OPHTHALMIC at 21:53

## 2018-09-07 RX ADMIN — ALVIMOPAN 12 MG: 12 CAPSULE ORAL at 09:02

## 2018-09-07 RX ADMIN — HYDROCODONE BITARTRATE AND ACETAMINOPHEN 2 TABLET: 5; 325 TABLET ORAL at 09:02

## 2018-09-07 RX ADMIN — TIMOLOL MALEATE 1 DROP: 5 SOLUTION OPHTHALMIC at 09:03

## 2018-09-07 RX ADMIN — DEXTRAN 70 AND HYPROMELLOSE 2910 1 DROP: 1; 3 SOLUTION/ DROPS OPHTHALMIC at 17:25

## 2018-09-07 RX ADMIN — HYDROCORTISONE SODIUM SUCCINATE 50 MG: 100 INJECTION, POWDER, FOR SOLUTION INTRAMUSCULAR; INTRAVENOUS at 09:02

## 2018-09-07 RX ADMIN — DEXTRAN 70 AND HYPROMELLOSE 2910 1 DROP: 1; 3 SOLUTION/ DROPS OPHTHALMIC at 09:02

## 2018-09-07 RX ADMIN — MORPHINE SULFATE 2 MG: 2 INJECTION, SOLUTION INTRAMUSCULAR; INTRAVENOUS at 21:54

## 2018-09-07 RX ADMIN — ENOXAPARIN SODIUM 40 MG: 40 INJECTION SUBCUTANEOUS at 09:02

## 2018-09-07 RX ADMIN — ALVIMOPAN 12 MG: 12 CAPSULE ORAL at 21:53

## 2018-09-07 RX ADMIN — HYDROCORTISONE SODIUM SUCCINATE 50 MG: 100 INJECTION, POWDER, FOR SOLUTION INTRAMUSCULAR; INTRAVENOUS at 21:53

## 2018-09-07 RX ADMIN — CALCIUM CARBONATE-VITAMIN D TAB 500 MG-200 UNIT 1 TABLET: 500-200 TAB at 09:02

## 2018-09-07 RX ADMIN — LEVOTHYROXINE SODIUM 88 MCG: 88 TABLET ORAL at 05:29

## 2018-09-07 RX ADMIN — SERTRALINE 50 MG: 50 TABLET, FILM COATED ORAL at 23:42

## 2018-09-07 RX ADMIN — MULTIPLE VITAMINS W/ MINERALS TAB 1 TABLET: TAB at 09:02

## 2018-09-07 ASSESSMENT — PAIN DESCRIPTION - LOCATION
LOCATION: ABDOMEN
LOCATION: ABDOMEN

## 2018-09-07 ASSESSMENT — PAIN SCALES - GENERAL
PAINLEVEL_OUTOF10: 7
PAINLEVEL_OUTOF10: 5
PAINLEVEL_OUTOF10: 8

## 2018-09-07 ASSESSMENT — PAIN DESCRIPTION - PAIN TYPE: TYPE: ACUTE PAIN;SURGICAL PAIN

## 2018-09-07 NOTE — CARE COORDINATION
IV fluids continue, tolerating full liquid diet, PT/OT continue. Plan for possible discharge to TCU this weekend.   Electronically signed by Kerry Wyatt RN on 9/7/2018 at 2:36 PM

## 2018-09-07 NOTE — PROGRESS NOTES
88 mcg Oral Daily Carolynn Jacobs MD   88 mcg at 09/07/18 6961    therapeutic multivitamin-minerals 1 tablet  1 tablet Oral Daily Carolynn Jacobs MD   1 tablet at 09/07/18 0902    ondansetron (ZOFRAN-ODT) disintegrating tablet 4 mg  4 mg Oral Q8H PRN Carolynn Jacobs MD   4 mg at 09/04/18 1327    calcium-vitamin D (OSCAL-500) 500-200 MG-UNIT per tablet 1 tablet  1 tablet Oral Daily Carolynn Jacobs MD   1 tablet at 09/07/18 0902    pantoprazole (PROTONIX) tablet 40 mg  40 mg Oral Daily Carolynn Jacobs MD   40 mg at 09/07/18 0902    sertraline (ZOLOFT) tablet 50 mg  50 mg Oral BID Carolynn Jacobs MD   50 mg at 09/07/18 0902    timolol (TIMOPTIC) 0.5 % ophthalmic solution 1 drop  1 drop Right Eye BID Carolynn Jacobs MD   1 drop at 09/07/18 6428    alvimopan (ENTEREG) capsule 12 mg  12 mg Oral BID Carolynn Jacobs MD   12 mg at 09/07/18 0902    0.9 % sodium chloride infusion   Intravenous Continuous Carolynn Jacobs  mL/hr at 09/06/18 1429      sodium chloride flush 0.9 % injection 10 mL  10 mL Intravenous 2 times per day Carolynn Jacobs MD   10 mL at 09/06/18 0827    sodium chloride flush 0.9 % injection 10 mL  10 mL Intravenous PRN Carolynn Jacobs MD        acetaminophen (TYLENOL) tablet 650 mg  650 mg Oral Q4H PRN Carolynn Jacobs MD        ondansetron [facility]) injection 4 mg  4 mg Intravenous Q6H PRN Carolynn Jacobs MD   4 mg at 09/06/18 2049    HYDROcodone-acetaminophen (NORCO) 5-325 MG per tablet 1 tablet  1 tablet Oral Q4H PRN Carolynn Jacobs MD        Or    HYDROcodone-acetaminophen [facility]) 5-325 MG per tablet 2 tablet  2 tablet Oral Q4H PRN Carolynn Jacobs MD   2 tablet at 09/07/18 4438    morphine injection 2 mg  2 mg Intravenous Q2H PRN Carolynn Jacobs MD        Or   [facility] morphine (PF) injection 4 mg  4 mg Intravenous Q2H PRN Carolynn Jacobs MD   4 mg at 09/05/18 0555    dextran 70-hypromellose (TEARS NATURALE) 0.1-0.3 % opthalmic solution 1 drop  1 drop Both Eyes 4x Daily Carolynn Jacobs MD   1 drop at 09/07/18 0902     LABS  CBC with

## 2018-09-07 NOTE — PROGRESS NOTES
GASTROINTESTINAL ENDOSCOPY  11/06       Restrictions/Precautions:  General Precautions, Fall Risk  Other position/activity restrictions: Abdominal binder    Prior Level of Function:  ADL Assistance: Needs assistance (A for dressing & bathing)  Homemaking Assistance: Needs assistance  Ambulation Assistance: Independent  Transfer Assistance: Independent  Additional Comments: Pt reports using a walker or cane to get around at home PLOF. Pt reports she has an aide 5x week that A with bathing & IADLs    Subjective  Subjective: Patient plesant and cooperative, resting in bed upon arrival reported needing to use bathroom   Overall Orientation Status: Within Functional Limits    Pain:  Pain Assessment  Patient Currently in Pain: Yes  Pain Level: 8  Pain Location: Abdomen    Objective  ADL  Grooming: Contact guard assistance; Increased time to complete (standing at sink to wash hands, slow moving)  LE Bathing: Moderate assistance (d/t to loose stool, patient able to wash upper LE thighs however required assist for below knee )  LE Dressing: Maximum assistance (colby/doffing slipper socks)  Toileting: Moderate assistance; Increased time to complete (Min assist with clothing management, patient attempted to complete hygiene after BM required mod A, slow moving and increased time for repeated loose stool. )     Bed mobility  Supine to Sit: Contact guard assistance  Sit to Supine: Minimal assistance  Scooting: Contact guard assistance    Transfers  Sit to stand: Contact guard assistance  Stand to sit: Contact guard assistance  Toilet Transfers  Toilet - Technique: Ambulating  Equipment Used: Standard toilet  Toilet Transfer: Contact guard assistance  Toilet Transfers Comments: vcs for walker placement and utilize grab bars    Balance  Sitting Balance: Stand by assistance (EOB)  Standing Balance: Contact guard assistance     Activity: toileting and grooming  Comment: From EOB <> STS      Functional Mobility  Functional - Mobility

## 2018-09-07 NOTE — PROGRESS NOTES
[address] [address]  INPATIENT PHYSICAL THERAPY  DAILYNOTE  [facility] RENAL TELEMETRY 6K - 6K-28/028-A    Time In: 9244  Time Out: 1045  Timed Code Treatment Minutes: 13 Minutes  Minutes: 13          Date: 2018  Patient Name: Devorah Walker,  Gender:  female        MRN: [id number]  : 1933  (80 y.o.)     Referring Practitioner: Marcela Lutz MD  Diagnosis: Parastomal hernia without obstruction or gangrene  Additional Pertinent Hx: Pt admitted for COLOSTOMY REVERSAL AND PARASTOMAL HERNIA REPAIR, partial colectomy, small bowel resection by Dr. Ninfa Dias.      Past Medical History:   Diagnosis Date    Anxiety     Blind right eye     Breast cancer metastasized to liver ([facility] [address] 05/10/2016    Liver lesion noted     Carotid stenosis      Left ICA 25%    Col[facility] in place [facility])     Degenerative arthritis of cervical spine     GERD (gastroesophageal reflux disease)     Hypercholesteremia     Hypoestrogenism     Hypothyroidism     Lumbago     MDRO (multiple drug resistant organisms) resistance     pt stated cleared    Sigmoid diverticulosis     Spondylolisthesis of lumbosacral region     Steroid-induced hyperglycemia     Subclavian artery stenosis (HCC)     left    SVT (supraventricular tachycardia) ([facility] [address]     Temporal arteritis ([facility] [address]     Vertebral artery occlusion     left    Vitamin D deficiency 2017     Past Surgical History:   Procedure Laterality Date    CARDIAC CATHETERIZATION      CATARACT REMOVAL  right ; left     CHOLECYSTECTOMY      COLONOSCOPY      DILATION AND CURETTAGE OF UTERUS      EYE SURGERY      EYE SURGERY Right 2017    Dr Mikey Newton in [address]      partial    OTHER SURGICAL HISTORY  16    robotic assisted laparoscopic anterior resection    [place] OFFICE/OUTPT VISIT,PROCEDURE ONLY N/A 2018    COLOSTOMY REVERSAL AND PARASTOMAL HERNIA REPAIR performed by Jono Enriquez MD at [address] / PULMONARY REQUIRES PT FOLLOW UP: Yes  Discharge Recommendations: Continue to assess pending progress, Patient would benefit from continued therapy after discharge    Patient Education:  Patient Education: POC, Ther ex    Equipment Recommendations:  Equipment Needed: No    Safety:  Type of devices:  All fall risk precautions in place, Call light within reach, Nurse notified, Gait belt, Patient at risk for falls, Bed alarm in place, Left in bed    Plan:  Times per week: 5x GM  Times per day: Daily  Specific instructions for Next Treatment: ther ex, ther act, gait trng, functional mobility  Current Treatment Recommendations: Strengthening, Balance Training, Endurance Training, Functional Mobility Training, Transfer Training, Gait Training, Stair training, Patient/Caregiver Education & Training, Safety Education & Training    Goals:  Patient goals : go home    Short term goals  Time Frame for Short term goals: 3 days  Short term goal 1: Pt to be Mod I for supine <> sit to get in/out of bed  Short term goal 2: Pt to be Mod I for sit <> stand to get  up to ambulate  Short term goal 3: Pt to ambulate > 50 ft with RW with Supervision for household distances  Short term goal 4: Pt to negotiate 2 steps with 1 rail with SBA for home access    Long term goals  Time Frame for Long term goals : not set due to short ELOS            AM-PAC Inpatient Mobility without Stair Climbing Raw Score : 15  AM-PAC Inpatient without Stair Climbing T-Scale Score : 43.03  Mobility Inpatient CMS 0-100% Score: 47.43  Mobility Inpatient without Stair CMS G-Code Modifier : RAOUL

## 2018-09-07 NOTE — PLAN OF CARE
Problem: Falls - Risk of:  Goal: Will remain free from falls  Will remain free from falls   Outcome: Ongoing  No falls noted this shift. Continue falling star program. Bed alarm on, bed in low position. Call light and personal belongings in reach. Patient uses call light appropriately. Problem: Risk for Impaired Skin Integrity  Goal: Tissue integrity - skin and mucous membranes  Structural intactness and normal physiological function of skin and  mucous membranes. Outcome: Ongoing  No skin break down noted at this time. Encouraged patient to reposition self in bed. Problem: Pain:  Goal: Pain level will decrease  Pain level will decrease   Outcome: Ongoing  Pt complaining of pain this shift. PRN pain medication available. Will monitor. Problem: Discharge Planning:  Goal: Discharged to appropriate level of care  Discharged to appropriate level of care   Outcome: Ongoing  Pt to go to TCU at discharge. Will continue to assess discharge needs. Problem: Bowel Function - Altered:  Goal: Bowel elimination is within specified parameters  Bowel elimination is within specified parameters   Outcome: Ongoing  Pt has not had a bowel movement this shift. Will monitor. Problem: Cardiac Output - Decreased:  Goal: Hemodynamic stability will improve  Hemodynamic stability will improve   Outcome: Ongoing  Vitals:    09/06/18 2038   BP: (!) 149/62   Pulse: 75   Resp: 16   Temp: 99.2 °F (37.3 °C)   SpO2: 95%         Comments: Care plan reviewed with patient. Patient verbalizes understanding of plan of care and contributes to goal setting.

## 2018-09-08 LAB
CREATININE FLUID: 0.6 MG/DL
ERYTHROCYTE [DISTWIDTH] IN BLOOD BY AUTOMATED COUNT: 15.8 % (ref 11.5–14.5)
ERYTHROCYTE [DISTWIDTH] IN BLOOD BY AUTOMATED COUNT: 51.8 FL (ref 35–45)
HCT VFR BLD CALC: 26.8 % (ref 37–47)
HEMOGLOBIN: 7.9 GM/DL (ref 12–16)
MCH RBC QN AUTO: 26.2 PG (ref 26–33)
MCHC RBC AUTO-ENTMCNC: 29.5 GM/DL (ref 32.2–35.5)
MCV RBC AUTO: 89 FL (ref 81–99)
PLATELET # BLD: 156 THOU/MM3 (ref 130–400)
PMV BLD AUTO: 11.9 FL (ref 9.4–12.4)
RBC # BLD: 3.01 MILL/MM3 (ref 4.2–5.4)
SPECIMEN SOURCE: NORMAL
WBC # BLD: 8.3 THOU/MM3 (ref 4.8–10.8)

## 2018-09-08 PROCEDURE — 99024 POSTOP FOLLOW-UP VISIT: CPT | Performed by: SURGERY

## 2018-09-08 PROCEDURE — 36415 COLL VENOUS BLD VENIPUNCTURE: CPT

## 2018-09-08 PROCEDURE — 2580000003 HC RX 258: Performed by: SURGERY

## 2018-09-08 PROCEDURE — 6370000000 HC RX 637 (ALT 250 FOR IP): Performed by: SURGERY

## 2018-09-08 PROCEDURE — 2700000000 HC OXYGEN THERAPY PER DAY

## 2018-09-08 PROCEDURE — 1200000003 HC TELEMETRY R&B

## 2018-09-08 PROCEDURE — 6370000000 HC RX 637 (ALT 250 FOR IP): Performed by: FAMILY MEDICINE

## 2018-09-08 PROCEDURE — 94760 N-INVAS EAR/PLS OXIMETRY 1: CPT

## 2018-09-08 PROCEDURE — 85027 COMPLETE CBC AUTOMATED: CPT

## 2018-09-08 PROCEDURE — 6360000002 HC RX W HCPCS: Performed by: SURGERY

## 2018-09-08 RX ORDER — PREDNISONE 1 MG/1
2 TABLET ORAL DAILY
Status: DISCONTINUED | OUTPATIENT
Start: 2018-09-08 | End: 2018-09-10 | Stop reason: HOSPADM

## 2018-09-08 RX ADMIN — DEXTRAN 70 AND HYPROMELLOSE 2910 1 DROP: 1; 3 SOLUTION/ DROPS OPHTHALMIC at 17:23

## 2018-09-08 RX ADMIN — ENOXAPARIN SODIUM 40 MG: 40 INJECTION SUBCUTANEOUS at 08:53

## 2018-09-08 RX ADMIN — ISOSORBIDE MONONITRATE 30 MG: 30 TABLET ORAL at 08:54

## 2018-09-08 RX ADMIN — PANTOPRAZOLE SODIUM 40 MG: 40 TABLET, DELAYED RELEASE ORAL at 08:54

## 2018-09-08 RX ADMIN — HYDROCODONE BITARTRATE AND ACETAMINOPHEN 2 TABLET: 5; 325 TABLET ORAL at 08:54

## 2018-09-08 RX ADMIN — METOPROLOL TARTRATE 12.5 MG: 25 TABLET ORAL at 21:36

## 2018-09-08 RX ADMIN — CALCIUM CARBONATE-VITAMIN D TAB 500 MG-200 UNIT 1 TABLET: 500-200 TAB at 08:54

## 2018-09-08 RX ADMIN — ALPRAZOLAM 0.5 MG: 0.5 TABLET ORAL at 08:54

## 2018-09-08 RX ADMIN — PREDNISONE 2 MG: 1 TABLET ORAL at 11:16

## 2018-09-08 RX ADMIN — TIMOLOL MALEATE 1 DROP: 5 SOLUTION OPHTHALMIC at 21:36

## 2018-09-08 RX ADMIN — Medication 10 ML: at 08:54

## 2018-09-08 RX ADMIN — SERTRALINE 50 MG: 50 TABLET, FILM COATED ORAL at 21:36

## 2018-09-08 RX ADMIN — SERTRALINE 50 MG: 50 TABLET, FILM COATED ORAL at 11:16

## 2018-09-08 RX ADMIN — HYDROCODONE BITARTRATE AND ACETAMINOPHEN 2 TABLET: 5; 325 TABLET ORAL at 17:19

## 2018-09-08 RX ADMIN — MULTIPLE VITAMINS W/ MINERALS TAB 1 TABLET: TAB at 08:54

## 2018-09-08 RX ADMIN — DEXTRAN 70 AND HYPROMELLOSE 2910 1 DROP: 1; 3 SOLUTION/ DROPS OPHTHALMIC at 08:53

## 2018-09-08 RX ADMIN — SERTRALINE 50 MG: 50 TABLET, FILM COATED ORAL at 08:54

## 2018-09-08 RX ADMIN — DEXTRAN 70 AND HYPROMELLOSE 2910 1 DROP: 1; 3 SOLUTION/ DROPS OPHTHALMIC at 11:19

## 2018-09-08 RX ADMIN — LEVOTHYROXINE SODIUM 88 MCG: 88 TABLET ORAL at 06:58

## 2018-09-08 RX ADMIN — TIMOLOL MALEATE 1 DROP: 5 SOLUTION OPHTHALMIC at 08:53

## 2018-09-08 RX ADMIN — DEXTRAN 70 AND HYPROMELLOSE 2910 1 DROP: 1; 3 SOLUTION/ DROPS OPHTHALMIC at 21:36

## 2018-09-08 RX ADMIN — Medication 10 ML: at 21:37

## 2018-09-08 ASSESSMENT — PAIN SCALES - GENERAL
PAINLEVEL_OUTOF10: 5
PAINLEVEL_OUTOF10: 7
PAINLEVEL_OUTOF10: 9

## 2018-09-08 ASSESSMENT — PAIN DESCRIPTION - PAIN TYPE: TYPE: ACUTE PAIN;SURGICAL PAIN

## 2018-09-08 ASSESSMENT — PAIN DESCRIPTION - LOCATION: LOCATION: ABDOMEN

## 2018-09-08 NOTE — PROGRESS NOTES
garcia) and producing stool. HEENT: Normal HEENT exam.    Lungs:  Normal effort and normal respiratory rate. Breath sounds clear to auscultation. Heart: Normal rate. Regular rhythm. S1 normal and S2 normal.  Positive for murmur (1/6 deyanira). Abdomen: Abdomen is soft. Bowel sounds are normal.   There is incisional tenderness. Extremities: Normal range of motion. Neurological: Patient is alert and oriented to person, place and time. Assessment:    Condition: In stable condition. Improving. (Postop takedown of colostomy doing well    History of paroxysmal atrial tachycardia and with anemia we'll start low dose Lopressor    Postop anemia from blood loss stable    History of temporal arteritis chronic low dose prednisone 2 mg resumed    Hypothyroid stable    Transient hypoxia felt probable related to atelectasis postop now better    Physical deconditioning and requires TCU). Plan:   Per physical therapy. Start/continue incentive spirometry. Consults: physical therapy and occupational therapy. Advance diet as tolerated. Administer medications as ordered. (As stated adding Lopressor her resuming other home meds    Stable postop and will be going to TCU on September 10).        Leonidas Arcos MD  9/8/2018

## 2018-09-08 NOTE — PROGRESS NOTES
[facility] Vaughn Landry  Postoperative Progress Note    Pt Name: Sandra Nagy Record Number: [id number]  Date of Birth 5/29/1933   Today's Date: 9/8/2018    SUBJECTIVE  Austin Cue looks good this morning. She is alert and oriented. She wants more to eat. Koenig catheter remains for urinary retention. She is afebrile white count is normal.  She is having loose stool as expected after bowel prep. No evidence of GI bleeding. OBJECTIVE  VITALS: VITALS:  BP (!) 163/74   Pulse 70   Temp 98.7 °F (37.1 °C) (Oral)   Resp 16   Ht 5' 3\" (1.6 m)   Wt 162 lb 11.2 oz (73.8 kg)   SpO2 96%   BMI 28.82 kg/m²   GENERAL: alert, cooperative, no acute distress  LUNGS: clear to ausculation, without wheezes, rales   HEART: normal rate  ABDOMEN: Soft expected incisional tenderness, some serous drainage on dressing appears to be decreased.   INCISION: clean, dry and intact  EXTERMITY: no edema  INTAKE/OUTPUT :   INTAKE/OUTPUT:      Intake/Output Summary (Last 24 hours) at 09/08/18 0858  Last data filed at 09/08/18 1668   Gross per 24 hour   Intake          2260.19 ml   Output             1670 ml   Net           590.19 ml      Current Facility-Administered Medications   Medication Dose Route Frequency Provider Last Rate Last Dose    sertraline (ZOLOFT) tablet 50 mg  50 mg Oral BID Vaughn Landry MD        predniSONE (DELTASONE) tablet 2 mg  2 mg Oral Daily Vaughn Landry MD        enoxaparin (LOVENOX) injection 40 mg  40 mg Subcutaneous Daily Vaughn Landry MD   40 mg at 09/08/18 0853    albuterol (PROVENTIL) nebulizer solution 2.5 mg  2.5 mg Nebulization Q6H PRN Vaughn Landry MD        ALPRAZolam Dorotha Riches) tablet 0.5 mg  0.5 mg Oral TID PRN Vaughn Landry MD   0.5 mg at 09/08/18 1855    isosorbide mononitrate (IMDUR) extended release tablet 30 mg  30 mg Oral Daily Vaughn Landry MD   30 mg at 09/08/18 2071    levothyroxine (SYNTHROID) tablet 88 mcg  88 mcg Oral Daily Vaughn Landry MD   88 mcg

## 2018-09-09 LAB
ANION GAP SERPL CALCULATED.3IONS-SCNC: 8 MEQ/L (ref 8–16)
BUN BLDV-MCNC: 10 MG/DL (ref 7–22)
CALCIUM SERPL-MCNC: 7.9 MG/DL (ref 8.5–10.5)
CHLORIDE BLD-SCNC: 106 MEQ/L (ref 98–111)
CO2: 26 MEQ/L (ref 23–33)
CREAT SERPL-MCNC: 0.4 MG/DL (ref 0.4–1.2)
GFR SERPL CREATININE-BSD FRML MDRD: > 90 ML/MIN/1.73M2
GLUCOSE BLD-MCNC: 107 MG/DL (ref 70–108)
POTASSIUM SERPL-SCNC: 3.4 MEQ/L (ref 3.5–5.2)
SODIUM BLD-SCNC: 140 MEQ/L (ref 135–145)

## 2018-09-09 PROCEDURE — 2580000003 HC RX 258: Performed by: SURGERY

## 2018-09-09 PROCEDURE — 1200000003 HC TELEMETRY R&B

## 2018-09-09 PROCEDURE — 80048 BASIC METABOLIC PNL TOTAL CA: CPT

## 2018-09-09 PROCEDURE — 6360000002 HC RX W HCPCS: Performed by: SURGERY

## 2018-09-09 PROCEDURE — 36415 COLL VENOUS BLD VENIPUNCTURE: CPT

## 2018-09-09 PROCEDURE — 6370000000 HC RX 637 (ALT 250 FOR IP): Performed by: FAMILY MEDICINE

## 2018-09-09 PROCEDURE — 99024 POSTOP FOLLOW-UP VISIT: CPT | Performed by: SURGERY

## 2018-09-09 PROCEDURE — 6370000000 HC RX 637 (ALT 250 FOR IP): Performed by: SURGERY

## 2018-09-09 RX ADMIN — TIMOLOL MALEATE 1 DROP: 5 SOLUTION OPHTHALMIC at 10:14

## 2018-09-09 RX ADMIN — Medication 10 ML: at 20:19

## 2018-09-09 RX ADMIN — CALCIUM CARBONATE-VITAMIN D TAB 500 MG-200 UNIT 1 TABLET: 500-200 TAB at 10:11

## 2018-09-09 RX ADMIN — PANTOPRAZOLE SODIUM 40 MG: 40 TABLET, DELAYED RELEASE ORAL at 10:11

## 2018-09-09 RX ADMIN — TIMOLOL MALEATE 1 DROP: 5 SOLUTION OPHTHALMIC at 20:19

## 2018-09-09 RX ADMIN — LEVOTHYROXINE SODIUM 88 MCG: 88 TABLET ORAL at 07:33

## 2018-09-09 RX ADMIN — PREDNISONE 2 MG: 1 TABLET ORAL at 10:11

## 2018-09-09 RX ADMIN — HYDROCODONE BITARTRATE AND ACETAMINOPHEN 2 TABLET: 5; 325 TABLET ORAL at 10:16

## 2018-09-09 RX ADMIN — ALPRAZOLAM 0.5 MG: 0.5 TABLET ORAL at 10:16

## 2018-09-09 RX ADMIN — DEXTRAN 70 AND HYPROMELLOSE 2910 1 DROP: 1; 3 SOLUTION/ DROPS OPHTHALMIC at 20:19

## 2018-09-09 RX ADMIN — DEXTRAN 70 AND HYPROMELLOSE 2910 1 DROP: 1; 3 SOLUTION/ DROPS OPHTHALMIC at 10:17

## 2018-09-09 RX ADMIN — SERTRALINE 50 MG: 50 TABLET, FILM COATED ORAL at 20:19

## 2018-09-09 RX ADMIN — HYDROCODONE BITARTRATE AND ACETAMINOPHEN 2 TABLET: 5; 325 TABLET ORAL at 00:49

## 2018-09-09 RX ADMIN — ISOSORBIDE MONONITRATE 30 MG: 30 TABLET ORAL at 10:12

## 2018-09-09 RX ADMIN — Medication 10 ML: at 10:13

## 2018-09-09 RX ADMIN — METOPROLOL TARTRATE 12.5 MG: 25 TABLET ORAL at 10:12

## 2018-09-09 RX ADMIN — DEXTRAN 70 AND HYPROMELLOSE 2910 1 DROP: 1; 3 SOLUTION/ DROPS OPHTHALMIC at 16:44

## 2018-09-09 RX ADMIN — HYDROCODONE BITARTRATE AND ACETAMINOPHEN 2 TABLET: 5; 325 TABLET ORAL at 14:50

## 2018-09-09 RX ADMIN — HYDROCODONE BITARTRATE AND ACETAMINOPHEN 2 TABLET: 5; 325 TABLET ORAL at 20:19

## 2018-09-09 RX ADMIN — SERTRALINE 50 MG: 50 TABLET, FILM COATED ORAL at 10:13

## 2018-09-09 RX ADMIN — METOPROLOL TARTRATE 12.5 MG: 25 TABLET ORAL at 21:19

## 2018-09-09 RX ADMIN — DEXTRAN 70 AND HYPROMELLOSE 2910 1 DROP: 1; 3 SOLUTION/ DROPS OPHTHALMIC at 12:46

## 2018-09-09 RX ADMIN — MULTIPLE VITAMINS W/ MINERALS TAB 1 TABLET: TAB at 10:11

## 2018-09-09 RX ADMIN — ENOXAPARIN SODIUM 40 MG: 40 INJECTION SUBCUTANEOUS at 10:15

## 2018-09-09 ASSESSMENT — PAIN SCALES - GENERAL
PAINLEVEL_OUTOF10: 9
PAINLEVEL_OUTOF10: 3
PAINLEVEL_OUTOF10: 8
PAINLEVEL_OUTOF10: 8

## 2018-09-09 NOTE — PROGRESS NOTES
[facility]  Postoperative Progress Note    Pt Name: Sandra Nagy Record Number: [id number]  Date of Birth 5/29/1933   Today's Date: 9/9/2018    Laura Alexandre looks pretty good today. She is tolerating oral intake and having stool output. TREASURE drain serous. Normal creatinine. OBJECTIVE  VITALS: VITALS:  /64   Pulse 68   Temp 97.8 °F (36.6 °C) (Oral)   Resp 16   Ht 5' 3\" (1.6 m)   Wt 161 lb 1.6 oz (73.1 kg)   SpO2 97%   BMI 28.54 kg/m²   GENERAL: alert, cooperative, no acute distress  LUNGS: clear to ausculation, without wheezes, rales   HEART: normal rate  ABDOMEN: Soft expected incisional tenderness, dressing has serous drainage.   INCISION: clean, dry and intact  EXTERMITY: no edema  INTAKE/OUTPUT :   INTAKE/OUTPUT:      Intake/Output Summary (Last 24 hours) at 09/09/18 0740  Last data filed at 09/09/18 0357   Gross per 24 hour   Intake              430 ml   Output             2170 ml   Net            -1740 ml      Current Facility-Administered Medications   Medication Dose Route Frequency Provider Last Rate Last Dose    sertraline (ZOLOFT) tablet 50 mg  50 mg Oral BID Ashley Wheeler MD   50 mg at 09/08/18 1116    predniSONE (DELTASONE) tablet 2 mg  2 mg Oral Daily Ashley Wheeler MD   2 mg at 09/08/18 1116    metoprolol tartrate (LOPRESSOR) tablet 12.5 mg  12.5 mg Oral BID Nilda Mccary MD   12.5 mg at 09/08/18 2136    enoxaparin (LOVENOX) injection 40 mg  40 mg Subcutaneous Daily Ashley Wheeler MD   40 mg at 09/08/18 0853    albuterol (PROVENTIL) nebulizer solution 2.5 mg  2.5 mg Nebulization Q6H PRN Ashley Wheeler MD        ALPRAZolam See Estrada) tablet 0.5 mg  0.5 mg Oral TID PRN Ashley Wheeler MD   0.5 mg at 09/08/18 5140    isosorbide mononitrate (IMDUR) extended release tablet 30 mg  30 mg Oral Daily Ashley Wheeler MD   30 mg at 09/08/18 0854    levothyroxine (SYNTHROID) tablet 88 mcg  88 mcg Oral Daily Ashley Wheeler MD   88 mcg at 09/09/18 6669    therapeutic multivitamin-minerals 1 tablet  1 tablet Oral Daily Wicho العلي MD   1 tablet at 09/08/18 0854    ondansetron (ZOFRAN-ODT) disintegrating tablet 4 mg  4 mg Oral Q8H PRN Wicho العلي MD   4 mg at 09/04/18 1327    calcium-vitamin D (OSCAL-500) 500-200 MG-UNIT per tablet 1 tablet  1 tablet Oral Daily Wicho العلي MD   1 tablet at 09/08/18 0854    pantoprazole (PROTONIX) tablet 40 mg  40 mg Oral Daily Wicho العلي MD   40 mg at 09/08/18 0854    sertraline (ZOLOFT) tablet 50 mg  50 mg Oral BID Wicho العلي MD   50 mg at 09/08/18 2136    timolol (TIMOPTIC) 0.5 % ophthalmic solution 1 drop  1 drop Right Eye BID Wicho العلي MD   1 drop at 09/08/18 2136    sodium chloride flush 0.9 % injection 10 mL  10 mL Intravenous 2 times per day Wicho العلي MD   10 mL at 09/08/18 2137    sodium chloride flush 0.9 % injection 10 mL  10 mL Intravenous PRN Wicho العلي MD        acetaminophen (TYLENOL) tablet 650 mg  650 mg Oral Q4H PRN Wicho العلي MD        ondansetron [facility]) injection 4 mg  4 mg Intravenous Q6H PRN Wicho العلي MD   4 mg at 09/06/18 2049    HYDROcodone-acetaminophen (NORCO) 5-325 MG per tablet 1 tablet  1 tablet Oral Q4H PRN Wicho اللعي MD        Or    HYDROcodone-acetaminophen [facility]) 5-325 MG per tablet 2 tablet  2 tablet Oral Q4H PRN Wicho العلي MD   2 tablet at 09/09/18 0049    morphine injection 2 mg  2 mg Intravenous Q2H PRN Wicho العلي MD   2 mg at 09/07/18 2154    Or    morphine (PF) injection 4 mg  4 mg Intravenous Q2H PRN Wicho العلي MD   4 mg at 09/05/18 0555    dextran 70-hypromellose (TEARS NATURALE) 0.1-0.3 % opthalmic solution 1 drop  1 drop Both Eyes 4x Daily Wicho العلي MD   1 drop at 09/08/18 5956     LABS  CBC with Differential:    Lab Results   Component Value Date    WBC 8.3 09/08/2018    RBC 3.01 09/08/2018    HGB 7.9 09/08/2018    HCT 26.8 09/08/2018     09/08/2018    MCV 89.0 09/08/2018    MCH 26.2 09/08/2018    MCHC 29.5 09/08/2018

## 2018-09-10 ENCOUNTER — HOSPITAL ENCOUNTER (INPATIENT)
Age: 83
LOS: 9 days | Discharge: HOME HEALTH | DRG: 950 | End: 2018-09-19
Attending: FAMILY MEDICINE | Admitting: EMERGENCY MEDICINE
Payer: MEDICARE

## 2018-09-10 VITALS
DIASTOLIC BLOOD PRESSURE: 56 MMHG | HEART RATE: 65 BPM | BODY MASS INDEX: 28.72 KG/M2 | OXYGEN SATURATION: 91 % | WEIGHT: 162.1 LBS | RESPIRATION RATE: 20 BRPM | HEIGHT: 63 IN | SYSTOLIC BLOOD PRESSURE: 112 MMHG | TEMPERATURE: 98.9 F

## 2018-09-10 DIAGNOSIS — K43.5 PARASTOMAL HERNIA WITHOUT OBSTRUCTION OR GANGRENE: ICD-10-CM

## 2018-09-10 DIAGNOSIS — R09.02 HYPOXEMIA: ICD-10-CM

## 2018-09-10 DIAGNOSIS — H54.40 BLIND RIGHT EYE: Primary | ICD-10-CM

## 2018-09-10 DIAGNOSIS — R53.1 WEAKNESS GENERALIZED: ICD-10-CM

## 2018-09-10 PROBLEM — Z86.73 H/O: CVA (CEREBROVASCULAR ACCIDENT): Status: ACTIVE | Noted: 2017-11-06

## 2018-09-10 PROBLEM — I25.10 CORONARY ARTERY DISEASE INVOLVING NATIVE CORONARY ARTERY OF NATIVE HEART WITHOUT ANGINA PECTORIS: Status: ACTIVE | Noted: 2017-11-06

## 2018-09-10 PROCEDURE — L0625 LO FLEX L1-BELOW L5 PRE OTS: HCPCS

## 2018-09-10 PROCEDURE — 0220000000 HC SKILLED NURSING FACILITY

## 2018-09-10 PROCEDURE — 99024 POSTOP FOLLOW-UP VISIT: CPT | Performed by: SURGERY

## 2018-09-10 PROCEDURE — 6360000002 HC RX W HCPCS: Performed by: SURGERY

## 2018-09-10 PROCEDURE — 2580000003 HC RX 258: Performed by: SURGERY

## 2018-09-10 PROCEDURE — 2709999900 HC NON-CHARGEABLE SUPPLY

## 2018-09-10 PROCEDURE — 97110 THERAPEUTIC EXERCISES: CPT

## 2018-09-10 PROCEDURE — 6370000000 HC RX 637 (ALT 250 FOR IP): Performed by: EMERGENCY MEDICINE

## 2018-09-10 PROCEDURE — 94640 AIRWAY INHALATION TREATMENT: CPT

## 2018-09-10 PROCEDURE — 6370000000 HC RX 637 (ALT 250 FOR IP): Performed by: SURGERY

## 2018-09-10 PROCEDURE — 97116 GAIT TRAINING THERAPY: CPT

## 2018-09-10 PROCEDURE — 1290000000 HC SEMI PRIVATE OTHER R&B

## 2018-09-10 PROCEDURE — 6370000000 HC RX 637 (ALT 250 FOR IP): Performed by: FAMILY MEDICINE

## 2018-09-10 RX ORDER — ALBUTEROL SULFATE 2.5 MG/3ML
2.5 SOLUTION RESPIRATORY (INHALATION) EVERY 6 HOURS PRN
Status: CANCELLED | OUTPATIENT
Start: 2018-09-10

## 2018-09-10 RX ORDER — ALPRAZOLAM 0.5 MG/1
0.5 TABLET ORAL 3 TIMES DAILY PRN
Status: DISCONTINUED | OUTPATIENT
Start: 2018-09-10 | End: 2018-09-19 | Stop reason: HOSPADM

## 2018-09-10 RX ORDER — TIMOLOL MALEATE 5 MG/ML
1 SOLUTION/ DROPS OPHTHALMIC 2 TIMES DAILY
Status: DISCONTINUED | OUTPATIENT
Start: 2018-09-10 | End: 2018-09-19 | Stop reason: HOSPADM

## 2018-09-10 RX ORDER — ISOSORBIDE MONONITRATE 30 MG/1
30 TABLET, EXTENDED RELEASE ORAL DAILY
Status: CANCELLED | OUTPATIENT
Start: 2018-09-11

## 2018-09-10 RX ORDER — ONDANSETRON 4 MG/1
4 TABLET, ORALLY DISINTEGRATING ORAL EVERY 8 HOURS PRN
Status: CANCELLED | OUTPATIENT
Start: 2018-09-10

## 2018-09-10 RX ORDER — OYSTER SHELL CALCIUM WITH VITAMIN D 500; 200 MG/1; [IU]/1
1 TABLET, FILM COATED ORAL DAILY
Status: DISCONTINUED | OUTPATIENT
Start: 2018-09-11 | End: 2018-09-19 | Stop reason: HOSPADM

## 2018-09-10 RX ORDER — SODIUM CHLORIDE 0.9 % (FLUSH) 0.9 %
10 SYRINGE (ML) INJECTION PRN
Status: DISCONTINUED | OUTPATIENT
Start: 2018-09-10 | End: 2018-09-19 | Stop reason: HOSPADM

## 2018-09-10 RX ORDER — PREDNISONE 1 MG/1
2 TABLET ORAL DAILY
Status: CANCELLED | OUTPATIENT
Start: 2018-09-11

## 2018-09-10 RX ORDER — HYDROCODONE BITARTRATE AND ACETAMINOPHEN 5; 325 MG/1; MG/1
2 TABLET ORAL EVERY 4 HOURS PRN
Status: DISCONTINUED | OUTPATIENT
Start: 2018-09-10 | End: 2018-09-19 | Stop reason: HOSPADM

## 2018-09-10 RX ORDER — PANTOPRAZOLE SODIUM 40 MG/1
40 TABLET, DELAYED RELEASE ORAL
Status: DISCONTINUED | OUTPATIENT
Start: 2018-09-11 | End: 2018-09-19 | Stop reason: HOSPADM

## 2018-09-10 RX ORDER — SODIUM CHLORIDE 0.9 % (FLUSH) 0.9 %
10 SYRINGE (ML) INJECTION EVERY 12 HOURS SCHEDULED
Status: CANCELLED | OUTPATIENT
Start: 2018-09-10

## 2018-09-10 RX ORDER — SODIUM CHLORIDE 0.9 % (FLUSH) 0.9 %
10 SYRINGE (ML) INJECTION EVERY 12 HOURS SCHEDULED
Status: DISCONTINUED | OUTPATIENT
Start: 2018-09-10 | End: 2018-09-11

## 2018-09-10 RX ORDER — PANTOPRAZOLE SODIUM 40 MG/1
40 TABLET, DELAYED RELEASE ORAL DAILY
Status: CANCELLED | OUTPATIENT
Start: 2018-09-11

## 2018-09-10 RX ORDER — M-VIT,TX,IRON,MINS/CALC/FOLIC 27MG-0.4MG
1 TABLET ORAL DAILY
Status: CANCELLED | OUTPATIENT
Start: 2018-09-11

## 2018-09-10 RX ORDER — ONDANSETRON 4 MG/1
4 TABLET, ORALLY DISINTEGRATING ORAL EVERY 8 HOURS PRN
Status: DISCONTINUED | OUTPATIENT
Start: 2018-09-10 | End: 2018-09-19 | Stop reason: HOSPADM

## 2018-09-10 RX ORDER — HYDROCODONE BITARTRATE AND ACETAMINOPHEN 5; 325 MG/1; MG/1
1 TABLET ORAL EVERY 4 HOURS PRN
Status: CANCELLED | OUTPATIENT
Start: 2018-09-10

## 2018-09-10 RX ORDER — ACETAMINOPHEN 325 MG/1
650 TABLET ORAL EVERY 4 HOURS PRN
Status: DISCONTINUED | OUTPATIENT
Start: 2018-09-10 | End: 2018-09-19 | Stop reason: HOSPADM

## 2018-09-10 RX ORDER — ISOSORBIDE MONONITRATE 30 MG/1
30 TABLET, EXTENDED RELEASE ORAL DAILY
Status: DISCONTINUED | OUTPATIENT
Start: 2018-09-11 | End: 2018-09-19 | Stop reason: HOSPADM

## 2018-09-10 RX ORDER — PREDNISONE 1 MG/1
2 TABLET ORAL DAILY
Status: DISCONTINUED | OUTPATIENT
Start: 2018-09-11 | End: 2018-09-19 | Stop reason: HOSPADM

## 2018-09-10 RX ORDER — ACETAMINOPHEN 325 MG/1
650 TABLET ORAL EVERY 4 HOURS PRN
Status: CANCELLED | OUTPATIENT
Start: 2018-09-10

## 2018-09-10 RX ORDER — ALBUTEROL SULFATE 2.5 MG/3ML
2.5 SOLUTION RESPIRATORY (INHALATION) EVERY 6 HOURS PRN
Status: DISCONTINUED | OUTPATIENT
Start: 2018-09-10 | End: 2018-09-19 | Stop reason: HOSPADM

## 2018-09-10 RX ORDER — ALPRAZOLAM 0.5 MG/1
0.5 TABLET ORAL 3 TIMES DAILY PRN
Status: CANCELLED | OUTPATIENT
Start: 2018-09-10

## 2018-09-10 RX ORDER — SODIUM CHLORIDE 0.9 % (FLUSH) 0.9 %
10 SYRINGE (ML) INJECTION PRN
Status: CANCELLED | OUTPATIENT
Start: 2018-09-10

## 2018-09-10 RX ORDER — HYDROCODONE BITARTRATE AND ACETAMINOPHEN 5; 325 MG/1; MG/1
1 TABLET ORAL EVERY 4 HOURS PRN
Status: DISCONTINUED | OUTPATIENT
Start: 2018-09-10 | End: 2018-09-19 | Stop reason: HOSPADM

## 2018-09-10 RX ORDER — TIMOLOL MALEATE 5 MG/ML
1 SOLUTION/ DROPS OPHTHALMIC 2 TIMES DAILY
Status: CANCELLED | OUTPATIENT
Start: 2018-09-10

## 2018-09-10 RX ORDER — HYDROCODONE BITARTRATE AND ACETAMINOPHEN 5; 325 MG/1; MG/1
2 TABLET ORAL EVERY 4 HOURS PRN
Status: CANCELLED | OUTPATIENT
Start: 2018-09-10

## 2018-09-10 RX ORDER — LEVOTHYROXINE SODIUM 88 UG/1
88 TABLET ORAL DAILY
Status: DISCONTINUED | OUTPATIENT
Start: 2018-09-11 | End: 2018-09-19 | Stop reason: HOSPADM

## 2018-09-10 RX ORDER — M-VIT,TX,IRON,MINS/CALC/FOLIC 27MG-0.4MG
1 TABLET ORAL DAILY
Status: DISCONTINUED | OUTPATIENT
Start: 2018-09-11 | End: 2018-09-19 | Stop reason: HOSPADM

## 2018-09-10 RX ORDER — LEVOTHYROXINE SODIUM 88 UG/1
88 TABLET ORAL DAILY
Status: CANCELLED | OUTPATIENT
Start: 2018-09-11

## 2018-09-10 RX ORDER — OYSTER SHELL CALCIUM WITH VITAMIN D 500; 200 MG/1; [IU]/1
1 TABLET, FILM COATED ORAL DAILY
Status: CANCELLED | OUTPATIENT
Start: 2018-09-11

## 2018-09-10 RX ADMIN — METOPROLOL TARTRATE 12.5 MG: 25 TABLET ORAL at 09:46

## 2018-09-10 RX ADMIN — HYDROCODONE BITARTRATE AND ACETAMINOPHEN 2 TABLET: 5; 325 TABLET ORAL at 09:45

## 2018-09-10 RX ADMIN — SERTRALINE 50 MG: 50 TABLET, FILM COATED ORAL at 09:45

## 2018-09-10 RX ADMIN — ALBUTEROL SULFATE 2.5 MG: 2.5 SOLUTION RESPIRATORY (INHALATION) at 12:42

## 2018-09-10 RX ADMIN — TIMOLOL MALEATE 1 DROP: 5 SOLUTION OPHTHALMIC at 09:46

## 2018-09-10 RX ADMIN — PREDNISONE 2 MG: 1 TABLET ORAL at 09:45

## 2018-09-10 RX ADMIN — DEXTRAN 70 AND HYPROMELLOSE 2910 1 DROP: 1; 3 SOLUTION/ DROPS OPHTHALMIC at 09:45

## 2018-09-10 RX ADMIN — LEVOTHYROXINE SODIUM 88 MCG: 88 TABLET ORAL at 06:09

## 2018-09-10 RX ADMIN — PANTOPRAZOLE SODIUM 40 MG: 40 TABLET, DELAYED RELEASE ORAL at 09:45

## 2018-09-10 RX ADMIN — ISOSORBIDE MONONITRATE 30 MG: 30 TABLET ORAL at 09:45

## 2018-09-10 RX ADMIN — ENOXAPARIN SODIUM 40 MG: 40 INJECTION SUBCUTANEOUS at 09:48

## 2018-09-10 RX ADMIN — MULTIPLE VITAMINS W/ MINERALS TAB 1 TABLET: TAB at 09:45

## 2018-09-10 RX ADMIN — DEXTRAN 70 AND HYPROMELLOSE 2910 1 DROP: 1; 3 SOLUTION/ DROPS OPHTHALMIC at 12:58

## 2018-09-10 RX ADMIN — Medication 10 ML: at 09:47

## 2018-09-10 RX ADMIN — HYDROCODONE BITARTRATE AND ACETAMINOPHEN 2 TABLET: 5; 325 TABLET ORAL at 15:42

## 2018-09-10 RX ADMIN — HYDROCODONE BITARTRATE AND ACETAMINOPHEN 2 TABLET: 5; 325 TABLET ORAL at 22:36

## 2018-09-10 RX ADMIN — METOPROLOL TARTRATE 12.5 MG: 25 TABLET ORAL at 22:35

## 2018-09-10 RX ADMIN — CALCIUM CARBONATE-VITAMIN D TAB 500 MG-200 UNIT 1 TABLET: 500-200 TAB at 09:45

## 2018-09-10 ASSESSMENT — PAIN DESCRIPTION - PROGRESSION: CLINICAL_PROGRESSION: GRADUALLY IMPROVING

## 2018-09-10 ASSESSMENT — PAIN DESCRIPTION - LOCATION: LOCATION: ABDOMEN

## 2018-09-10 ASSESSMENT — PAIN SCALES - GENERAL
PAINLEVEL_OUTOF10: 6
PAINLEVEL_OUTOF10: 8
PAINLEVEL_OUTOF10: 6
PAINLEVEL_OUTOF10: 3
PAINLEVEL_OUTOF10: 8
PAINLEVEL_OUTOF10: 8

## 2018-09-10 ASSESSMENT — PAIN DESCRIPTION - PAIN TYPE
TYPE: SURGICAL PAIN
TYPE: ACUTE PAIN;SURGICAL PAIN

## 2018-09-10 ASSESSMENT — PAIN DESCRIPTION - ONSET: ONSET: GRADUAL

## 2018-09-10 ASSESSMENT — PAIN DESCRIPTION - ORIENTATION: ORIENTATION: MID

## 2018-09-10 ASSESSMENT — PAIN DESCRIPTION - DESCRIPTORS: DESCRIPTORS: DISCOMFORT;DULL;SORE

## 2018-09-10 ASSESSMENT — PAIN DESCRIPTION - FREQUENCY: FREQUENCY: INTERMITTENT

## 2018-09-10 NOTE — PROGRESS NOTES
levothyroxine (SYNTHROID) tablet 88 mcg  88 mcg Oral Daily Andre Castañeda MD   88 mcg at 09/10/18 3113    therapeutic multivitamin-minerals 1 tablet  1 tablet Oral Daily Andre Castañeda MD   1 tablet at 09/09/18 1011    ondansetron (ZOFRAN-ODT) disintegrating tablet 4 mg  4 mg Oral Q8H PRN Andre Castañeda MD   4 mg at 09/04/18 1327    calcium-vitamin D (OSCAL-500) 500-200 MG-UNIT per tablet 1 tablet  1 tablet Oral Daily Andre Castañeda MD   1 tablet at 09/09/18 1011    pantoprazole (PROTONIX) tablet 40 mg  40 mg Oral Daily Andre Castañeda MD   40 mg at 09/09/18 1011    sertraline (ZOLOFT) tablet 50 mg  50 mg Oral BID Andre Castañeda MD   50 mg at 09/08/18 2136    timolol (TIMOPTIC) 0.5 % ophthalmic solution 1 drop  1 drop Right Eye BID Andre Castañeda MD   1 drop at 09/09/18 2019    sodium chloride flush 0.9 % injection 10 mL  10 mL Intravenous 2 times per day Andre Castañeda MD   10 mL at 09/09/18 2019    sodium chloride flush 0.9 % injection 10 mL  10 mL Intravenous PRN Andre Castañeda MD        acetaminophen (TYLENOL) tablet 650 mg  650 mg Oral Q4H PRN Andre Castañeda MD        ondansetron [facility]) injection 4 mg  4 mg Intravenous Q6H PRN Andre Castañeda MD   4 mg at 09/06/18 2049    HYDROcodone-acetaminophen (NORCO) 5-325 MG per tablet 1 tablet  1 tablet Oral Q4H PRN Andre Castañeda MD        Or    HYDROcodone-acetaminophen [facility]) 5-325 MG per tablet 2 tablet  2 tablet Oral Q4H PRN Andre Castañeda MD   2 tablet at 09/09/18 2019    morphine injection 2 mg  2 mg Intravenous Q2H PRN Andre Castañeda MD   2 mg at 09/07/18 2154    Or    morphine (PF) injection 4 mg  4 mg Intravenous Q2H PRN Andre Castañeda MD   4 mg at 09/05/18 0555    dextran 70-hypromellose (TEARS NATURALE) 0.1-0.3 % opthalmic solution 1 drop  1 drop Both Eyes 4x Daily Andre Castañeda MD   1 drop at 09/09/18 2019     LABS  CBC with Differential:    Lab Results   Component Value Date    WBC 8.3 09/08/2018    RBC 3.01 09/08/2018    HGB 7.9 09/08/2018    HCT 26.8 09/08/2018    PLT 156 09/08/2018    MCV 89.0 09/08/2018    MCH 26.2 09/08/2018    MCHC 29.5 09/08/2018    RDW 17.4 07/23/2018    NRBC 0 09/05/2018    SEGSPCT 82.8 09/05/2018    LYMPHOPCT 28.5 07/23/2018    MONOPCT 7.1 09/05/2018    MONOPCT 9.2 07/23/2018    EOSPCT 1.7 07/23/2018    BASOPCT 0.2 07/23/2018    MONOSABS 1.2 09/05/2018    LYMPHSABS 1.6 09/05/2018    EOSABS 0.0 09/05/2018    BASOSABS 0.0 09/05/2018     CMP:    Lab Results   Component Value Date     09/09/2018    K 3.4 09/09/2018    K 4.8 09/05/2018     09/09/2018    CO2 26 09/09/2018    BUN 10 09/09/2018    CREATININE 0.4 09/09/2018    AGRATIO 1.7 07/23/2018    LABGLOM >90 09/09/2018    GLUCOSE 107 09/09/2018    GLUCOSE 103 07/23/2018    PROT 6.2 07/23/2018    LABALBU 3.9 07/23/2018    CALCIUM 7.9 09/09/2018    BILITOT 0.4 07/23/2018    BILITOT NEGATIVE 07/06/2017    ALKPHOS 36 07/23/2018    AST 22 07/23/2018    ALT 16 07/23/2018         RADIOLOGY: No new    ASSESSMENT  POD # 5 takedown colostomy/low coloproctostomy anastomosis partial colon resection and small bowel resection with primary anastomosis  Patient spontaneously voiding  PLAN:  1. Diet as tolerated, low fiber  2. VTE: Lovenox  3. Entereg discontinued  4. PT OT encourage out of bed and activity  5. Okay for transfer to TCU from a surgical standpoint.     Andre Castañeda MD  Electronically signed 9/10/2018 at 8:18 AM

## 2018-09-10 NOTE — FLOWSHEET NOTE
09/10/18 [address] Given Transfer Handoff   Oncoming Nurse/Offgoing Nurse Henny/Kassy   Handoff Communication Telephone   Time Handoff Given 9923   End of Shift Check Performed Yes    Report called to Saida Emerson on [address].  Pt going to [phone]

## 2018-09-10 NOTE — FLOWSHEET NOTE
[address] THERAPY MISSED TREATMENT NOTE  [facility] RENAL TELEMETRY 6K  6K-28/028-A      Date: 9/10/2018  Patient Name: Katherine Santana        CSN: [id number]   : 1933  (80 y.o.)  Gender: female   Referring Practitioner: Dr. Gross Mail  Diagnosis: parastomal hernia without obstruction         REASON FOR MISSED TREATMENT:  Patient refused treatment. RN okayed OT session. Upon arrival patient was lying in bed. Pt reports she is fatigued and transferring to TCU this PM. Pt is requesting to rest at this time prior to admission to [facility].

## 2018-09-10 NOTE — PROGRESS NOTES
PRN Garfield Rios MD        ondansetron [facility]) injection 4 mg  4 mg Intravenous Q6H PRN Garfield Rios MD   4 mg at 09/06/18 2049    HYDROcodone-acetaminophen (NORCO) 5-325 MG per tablet 1 tablet  1 tablet Oral Q4H PRN Garfield Rios MD        Or    HYDROcodone-acetaminophen [facility]) 5-325 MG per tablet 2 tablet  2 tablet Oral Q4H PRN Garfield Rios MD   2 tablet at 09/09/18 2019    morphine injection 2 mg  2 mg Intravenous Q2H PRN Garfield Rios MD   2 mg at 09/07/18 2154    Or    morphine (PF) injection 4 mg  4 mg Intravenous Q2H PRN Garfield Rios MD   4 mg at 09/05/18 0555    dextran 70-hypromellose (TEARS NATURALE) 0.1-0.3 % opthalmic solution 1 drop  1 drop Both Eyes 4x Daily Garfield Rios MD   1 drop at 09/09/18 2019     Allergies   Allergen Reactions    Bactrim [Sulfamethoxazole-Trimethoprim] Swelling     Of tongue    Demerol Rash     nausea    Pcn [Penicillins] Rash     Active Problems:    GERD (gastroesophageal reflux disease)    Hypothyroidism due to acquired atrophy of thyroid    Steroid-induced hyperglycemia    Blind right eye    Parastomal hernia without obstruction or gangrene    Diverticular stricture (HCC)    Adhesion of intestine    Current chronic use of systemic steroids  Resolved Problems:    * No resolved hospital problems. *    Blood pressure 135/61, pulse 62, temperature 98 °F (36.7 °C), temperature source Oral, resp. rate 18, height 5' 3\" (1.6 m), weight 161 lb 1.6 oz (73.1 kg), SpO2 97 %, not currently breastfeeding. Subjective:  Symptoms:  Stable. Diet:  Adequate intake. Activity level: Impaired due to weakness. Pain:  She complains of pain that is mild. Objective:  General Appearance:  Comfortable. Vital signs: (most recent): Blood pressure 135/61, pulse 62, temperature 98 °F (36.7 °C), temperature source Oral, resp. rate 18, height 5' 3\" (1.6 m), weight 161 lb 1.6 oz (73.1 kg), SpO2 97 %, not currently breastfeeding.   Vital signs are normal.    Output: Producing urine

## 2018-09-11 PROCEDURE — 97530 THERAPEUTIC ACTIVITIES: CPT

## 2018-09-11 PROCEDURE — 6360000002 HC RX W HCPCS: Performed by: EMERGENCY MEDICINE

## 2018-09-11 PROCEDURE — 1290000000 HC SEMI PRIVATE OTHER R&B

## 2018-09-11 PROCEDURE — 2709999900 HC NON-CHARGEABLE SUPPLY

## 2018-09-11 PROCEDURE — 97110 THERAPEUTIC EXERCISES: CPT

## 2018-09-11 PROCEDURE — 97166 OT EVAL MOD COMPLEX 45 MIN: CPT

## 2018-09-11 PROCEDURE — 97163 PT EVAL HIGH COMPLEX 45 MIN: CPT

## 2018-09-11 PROCEDURE — 97535 SELF CARE MNGMENT TRAINING: CPT

## 2018-09-11 PROCEDURE — 6370000000 HC RX 637 (ALT 250 FOR IP): Performed by: EMERGENCY MEDICINE

## 2018-09-11 PROCEDURE — 2500000003 HC RX 250 WO HCPCS: Performed by: EMERGENCY MEDICINE

## 2018-09-11 RX ADMIN — HYDROCODONE BITARTRATE AND ACETAMINOPHEN 2 TABLET: 5; 325 TABLET ORAL at 20:14

## 2018-09-11 RX ADMIN — CALCIUM CARBONATE-VITAMIN D TAB 500 MG-200 UNIT 1 TABLET: 500-200 TAB at 12:40

## 2018-09-11 RX ADMIN — DEXTRAN 70 AND HYPROMELLOSE 2910 1 DROP: 1; 3 SOLUTION/ DROPS OPHTHALMIC at 20:02

## 2018-09-11 RX ADMIN — PREDNISONE 2 MG: 1 TABLET ORAL at 12:41

## 2018-09-11 RX ADMIN — TIMOLOL MALEATE 1 DROP: 5 SOLUTION OPHTHALMIC at 20:03

## 2018-09-11 RX ADMIN — LEVOTHYROXINE SODIUM 88 MCG: 88 TABLET ORAL at 05:49

## 2018-09-11 RX ADMIN — PANTOPRAZOLE SODIUM 40 MG: 40 TABLET, DELAYED RELEASE ORAL at 05:48

## 2018-09-11 RX ADMIN — HYDROCODONE BITARTRATE AND ACETAMINOPHEN 2 TABLET: 5; 325 TABLET ORAL at 12:48

## 2018-09-11 RX ADMIN — METOPROLOL TARTRATE 12.5 MG: 25 TABLET ORAL at 20:02

## 2018-09-11 RX ADMIN — HYDROCODONE BITARTRATE AND ACETAMINOPHEN 2 TABLET: 5; 325 TABLET ORAL at 05:56

## 2018-09-11 RX ADMIN — Medication 5 UNITS: at 12:41

## 2018-09-11 RX ADMIN — DEXTRAN 70 AND HYPROMELLOSE 2910 1 DROP: 1; 3 SOLUTION/ DROPS OPHTHALMIC at 12:41

## 2018-09-11 RX ADMIN — ONDANSETRON 4 MG: 4 TABLET, ORALLY DISINTEGRATING ORAL at 09:18

## 2018-09-11 RX ADMIN — SERTRALINE 50 MG: 50 TABLET, FILM COATED ORAL at 20:02

## 2018-09-11 RX ADMIN — ENOXAPARIN SODIUM 40 MG: 40 INJECTION SUBCUTANEOUS at 12:41

## 2018-09-11 RX ADMIN — ISOSORBIDE MONONITRATE 30 MG: 30 TABLET ORAL at 12:40

## 2018-09-11 RX ADMIN — MULTIPLE VITAMINS W/ MINERALS TAB 1 TABLET: TAB at 12:40

## 2018-09-11 ASSESSMENT — PAIN DESCRIPTION - PAIN TYPE
TYPE: SURGICAL PAIN

## 2018-09-11 ASSESSMENT — PAIN DESCRIPTION - DESCRIPTORS
DESCRIPTORS: DULL;ACHING
DESCRIPTORS: ACHING

## 2018-09-11 ASSESSMENT — PAIN DESCRIPTION - LOCATION
LOCATION: ABDOMEN

## 2018-09-11 ASSESSMENT — PAIN SCALES - GENERAL
PAINLEVEL_OUTOF10: 8
PAINLEVEL_OUTOF10: 0
PAINLEVEL_OUTOF10: 8
PAINLEVEL_OUTOF10: 8
PAINLEVEL_OUTOF10: 5
PAINLEVEL_OUTOF10: 8
PAINLEVEL_OUTOF10: 3

## 2018-09-11 ASSESSMENT — PAIN DESCRIPTION - ORIENTATION
ORIENTATION: MID;LOWER
ORIENTATION: MID;LOWER

## 2018-09-11 NOTE — PROGRESS NOTES
abdominal surgery                                             ADL  Grooming: Contact guard assistance, Increased time to complete (sinkside)  UE Bathing: Increased time to complete, Supervision  LE Bathing: Increased time to complete, Minimal assistance (B feet)  UE Dressing: Increased time to complete, Minimal assistance (assist with bra)  LE Dressing: Increased time to complete, Minimal assistance (B socks, pt able to colby underwear and pants with CGA)  Toileting: Increased time to complete, Minimal assistance (for thoroughness)     Bed mobility  Supine to Sit: Stand by assistance  Scooting: Stand by assistance    Transfers  Sit to stand: Contact guard assistance  Stand to sit: Contact guard assistance  Toilet Transfers  Toilet - Technique: Ambulating  Equipment Used: Standard toilet  Toilet Transfer: Minimal assistance  Toilet Transfers Comments: vcs for walker placement and to utilize grab bars    Balance  Sitting Balance: Supervision  Standing Balance: Contact guard assistance     Time: x 1-2 min x 5 trials during ADL session, fatigues quickly     Functional Mobility  Functional - Mobility Device: Rolling Walker  Activity: To/from bathroom  Assist Level: Contact guard assistance  Functional Mobility Comments: slow pace, min unsteadiness        Activity Tolerance:  Activity Tolerance: Patient limited by fatigue  Activity Tolerance: Treatment initiated, eval completed: Pt completed ADL session as detailed above. Pt with multiple RBs throughout session and fatigue noted. Assessment:  Assessment: Pt demo decreased ADL & functional mobility status d/t decreased balance & endurance s/p abdominal surgery. Continued OT recommended to educate Pt on compensatory strategies & safety with returning home.    Performance deficits / Impairments: Decreased functional mobility , Decreased endurance, Decreased ADL status, Decreased balance, Decreased safe awareness  Prognosis: Good  Discharge Recommendations: Home with Home health OT, Continue to assess pending progress (home with aides and [facility] OT )    Clinical Decision Making: Clinical Decision making was of Moderate Complexity as the result of analysis of data from a detailed assessment, a consideration of several treatment options, the presence of comorbidities affecting the plan of care and the need for minimal to moderate modifications or assistance required to complete the evaluation. Patient Education:  Patient Education: OT POC, safety with ADLs    Equipment Recommendations:  Equipment Needed: Yes  Other: potential need for LHAE    Safety:  Safety Devices in place: Yes  Type of devices: All fall risk precautions in place, Gait belt, Call light within reach, Chair alarm in place, Patient at risk for falls, Left in chair    Plan:  Times per week: 6x  Current Treatment Recommendations: Balance Training, Functional Mobility Training, Endurance Training, Safety Education & Training, Self-Care / ADL, Strengthening, Patient/Caregiver Education & Training    Goals:       Short term goals  Time Frame for Short term goals: 1 week  Short term goal 1: Pt will complete dynamic standing task with SBA x 4 min for increased ease of sinkside grooming  Short term goal 2: Pt will complete various t/fs including toilet with SBA & 0 vcs for safety/technique to increase indep with toileting routine. Short term goal 3: Pt will complete mobility to/from bathroom & in hallway with SBA, RW, & 0-2 vcs for walker safety & posture  Short term goal 4: Pt will complete LE dressing with CGA & LH AE prn to increase indep with self care. Long term goals  Time Frame for Long term goals : 2 weeks  Long term goal 1: Pt will complete ADL routine with s/u to increase indep with self care. Long term goal 2: Pt will complete light IADL task with S to increase indep with warming up meals when aide is not present at home.     Evaluation Complexity: Based on the findings of patient history, examination, clinical presentation, and decision making during this evaluation, this patient is of medium complexity.

## 2018-09-11 NOTE — PROGRESS NOTES
Abdominal dressing changed. Drain site- small amount of serosanguinous drainage on it. 2X incisions are clean dry and intake. No drainage. Closed with staples. Does have some reddness around the bottom of her middle incision. Will continue to monitor for changes. Pt tolerated dressing change well.

## 2018-09-11 NOTE — PROGRESS NOTES
[address] [address]  Recreational Therapy  Daily Note  [facility] TCU 8E    Time Spent with Patient: 25 minutes    Date:  9/11/2018       Patient Name: Janet Ahn      MRN: [id number]      YOB: 1933 (80 y.o.)       Gender: female  Diagnosis: s/p open takedown with partial colectomy  Referring Practitioner: Lili Renner MD    RESTRICTIONS/PRECAUTIONS:  Restrictions/Precautions: General Precautions, Fall Risk  Vision: Impaired  Hearing: Within functional limits    PAIN: no number given-nurse in to give nausea medication and check her pain     SUBJECTIVE:  I feel like I am going to pass out    OBJECTIVE:  Attempted RT evaluation but pt not feeling well stating she feels like she is going to pass out- breakfast in front of her and cannot eat at this time -requesting to get out of her own clothes and put on gown for comfort -assisted pt in getting gown on-sit to stand from recliner with CGA and assisted taking pants off that were tight around her waist -ambulated to bed with RW and CGA-assisted back to bed with cold cloth on forehead for comfort and fan brought into room also for comfort-02 on 2 liters-pt appreciative -will complete RT evaluation when able to tolerate         Patient Education  New Education Provided: Importance of Leisure, Walker Safety    Electronically signed by: WALTER Weiner  Date: 9/11/2018

## 2018-09-11 NOTE — PROGRESS NOTES
[address] [address]  INPATIENT PHYSICAL THERAPY  EVALUATION  [facility] 8E - 8E-65/065-A    Time In: 3187  Time Out: 1435  Timed Code Treatment Minutes: 28 Minutes  Minutes: 47          Date: 2018  Patient Name: Deanne Clifford,  Gender:  female        MRN: [id number]  : 1933  (80 y.o.)  Referral Date : 09/10/18   Referring Practitioner: Ordering and Attending: Jason Huerta MD  Diagnosis: S/P open takedown with partial colectomy  Additional Pertinent Hx: Pt admitted for COLOSTOMY REVERSAL AND PARASTOMAL HERNIA REPAIR, partial colectomy, small bowel resection by Dr. Ethan Marin.  To TCU on 9/10     Past Medical History:   Diagnosis Date    Anxiety     Blind right eye     Breast cancer metastasized to liver ([facility] [address] 05/10/2016    Liver lesion noted     Carotid stenosis      Left ICA 25%    Colostomy in place [facility])     Degenerative arthritis of cervical spine     GERD (gastroesophageal reflux disease)     Hypercholesteremia     Hypoestrogenism     Hypothyroidism     Lumbago     MDRO (multiple drug resistant organisms) resistance     pt stated cleared    Personal history of cardiac dysrhythmia     Sigmoid diverticulosis     Spondylolisthesis of lumbosacral region     Steroid-induced hyperglycemia     Subclavian artery stenosis (HCC)     left    SVT (supraventricular tachycardia) ([address]     Temporal arteritis ([facility] [address]     Vertebral artery occlusion     left    Vitamin D deficiency 2017     Past Surgical History:   Procedure Laterality Date    CARDIAC CATHETERIZATION      CATARACT REMOVAL  right ; left     CHOLECYSTECTOMY      COLONOSCOPY      DILATION AND CURETTAGE OF UTERUS      EYE SURGERY      EYE SURGERY Right 2017    Dr Lopez in [address]      partial    OTHER SURGICAL HISTORY  16    robotic assisted laparoscopic anterior resection    [place] OFFICE/OUTPT VISIT,PROCEDURE ONLY N/A 2018    COLOSTOMY REVERSAL AND household and community mobility  Long term goal 4: patient to negotiate 2 steps with single rail at S for home access  Long term goal 5: patient to perform car transfer at [facility] for transportation to and from appointments    Evaluation Complexity: Based on the findings of patient history, examination, clinical presentation, and decision making during this evaluation, the evaluation of Mattie Card  is of high complexity.

## 2018-09-12 PROCEDURE — 1290000000 HC SEMI PRIVATE OTHER R&B

## 2018-09-12 PROCEDURE — 6370000000 HC RX 637 (ALT 250 FOR IP): Performed by: EMERGENCY MEDICINE

## 2018-09-12 PROCEDURE — 6360000002 HC RX W HCPCS: Performed by: EMERGENCY MEDICINE

## 2018-09-12 PROCEDURE — 97530 THERAPEUTIC ACTIVITIES: CPT

## 2018-09-12 PROCEDURE — 97116 GAIT TRAINING THERAPY: CPT

## 2018-09-12 PROCEDURE — 97110 THERAPEUTIC EXERCISES: CPT

## 2018-09-12 PROCEDURE — 97535 SELF CARE MNGMENT TRAINING: CPT

## 2018-09-12 RX ADMIN — PREDNISONE 2 MG: 1 TABLET ORAL at 08:55

## 2018-09-12 RX ADMIN — CALCIUM CARBONATE-VITAMIN D TAB 500 MG-200 UNIT 1 TABLET: 500-200 TAB at 08:55

## 2018-09-12 RX ADMIN — ALPRAZOLAM 0.5 MG: 0.5 TABLET ORAL at 22:11

## 2018-09-12 RX ADMIN — PANTOPRAZOLE SODIUM 40 MG: 40 TABLET, DELAYED RELEASE ORAL at 06:33

## 2018-09-12 RX ADMIN — TIMOLOL MALEATE 1 DROP: 5 SOLUTION OPHTHALMIC at 08:57

## 2018-09-12 RX ADMIN — SERTRALINE 50 MG: 50 TABLET, FILM COATED ORAL at 20:03

## 2018-09-12 RX ADMIN — ONDANSETRON 4 MG: 4 TABLET, ORALLY DISINTEGRATING ORAL at 14:00

## 2018-09-12 RX ADMIN — METOPROLOL TARTRATE 12.5 MG: 25 TABLET ORAL at 08:55

## 2018-09-12 RX ADMIN — HYDROCODONE BITARTRATE AND ACETAMINOPHEN 2 TABLET: 5; 325 TABLET ORAL at 22:11

## 2018-09-12 RX ADMIN — HYDROCODONE BITARTRATE AND ACETAMINOPHEN 2 TABLET: 5; 325 TABLET ORAL at 08:54

## 2018-09-12 RX ADMIN — DEXTRAN 70 AND HYPROMELLOSE 2910 1 DROP: 1; 3 SOLUTION/ DROPS OPHTHALMIC at 14:02

## 2018-09-12 RX ADMIN — ENOXAPARIN SODIUM 40 MG: 40 INJECTION SUBCUTANEOUS at 08:57

## 2018-09-12 RX ADMIN — ALPRAZOLAM 0.5 MG: 0.5 TABLET ORAL at 01:32

## 2018-09-12 RX ADMIN — DEXTRAN 70 AND HYPROMELLOSE 2910 1 DROP: 1; 3 SOLUTION/ DROPS OPHTHALMIC at 08:57

## 2018-09-12 RX ADMIN — HYDROCODONE BITARTRATE AND ACETAMINOPHEN 2 TABLET: 5; 325 TABLET ORAL at 17:44

## 2018-09-12 RX ADMIN — LEVOTHYROXINE SODIUM 88 MCG: 88 TABLET ORAL at 06:33

## 2018-09-12 RX ADMIN — ISOSORBIDE MONONITRATE 30 MG: 30 TABLET ORAL at 08:55

## 2018-09-12 RX ADMIN — DEXTRAN 70 AND HYPROMELLOSE 2910 1 DROP: 1; 3 SOLUTION/ DROPS OPHTHALMIC at 20:05

## 2018-09-12 RX ADMIN — DEXTRAN 70 AND HYPROMELLOSE 2910 1 DROP: 1; 3 SOLUTION/ DROPS OPHTHALMIC at 17:44

## 2018-09-12 RX ADMIN — TIMOLOL MALEATE 1 DROP: 5 SOLUTION OPHTHALMIC at 20:05

## 2018-09-12 RX ADMIN — SERTRALINE 50 MG: 50 TABLET, FILM COATED ORAL at 08:55

## 2018-09-12 RX ADMIN — MULTIPLE VITAMINS W/ MINERALS TAB 1 TABLET: TAB at 08:55

## 2018-09-12 RX ADMIN — METOPROLOL TARTRATE 12.5 MG: 25 TABLET ORAL at 20:03

## 2018-09-12 ASSESSMENT — PAIN SCALES - GENERAL
PAINLEVEL_OUTOF10: 6
PAINLEVEL_OUTOF10: 8
PAINLEVEL_OUTOF10: 0
PAINLEVEL_OUTOF10: 8
PAINLEVEL_OUTOF10: 5
PAINLEVEL_OUTOF10: 0
PAINLEVEL_OUTOF10: 8
PAINLEVEL_OUTOF10: 7

## 2018-09-12 ASSESSMENT — PAIN DESCRIPTION - ORIENTATION
ORIENTATION: LOWER;MID
ORIENTATION: MID;LOWER
ORIENTATION: MID;LOWER

## 2018-09-12 ASSESSMENT — PAIN DESCRIPTION - DESCRIPTORS
DESCRIPTORS: ACHING
DESCRIPTORS: ACHING

## 2018-09-12 ASSESSMENT — PAIN DESCRIPTION - LOCATION
LOCATION: ABDOMEN

## 2018-09-12 NOTE — PROGRESS NOTES
Nutrition Assessment    Type and Reason for Visit: Initial, Consult (TCU nutrition assessment)    Nutrition Recommendations: Continue current diet and multivitamin. RD starting ONS TID. Malnutrition Assessment:  · Malnutrition Status: No malnutrition  · Findings of the 6 clinical characteristics of malnutrition (Minimum of 2 out of 6 clinical characteristics is required to make the diagnosis of moderate or severe Protein Calorie Malnutrition based on AND/ASPEN Guidelines):  1. Energy Intake- (50% or greater), not able to assess    2. Weight Loss-No significant weight loss,    3. Fat Loss-No significant subcutaneous fat loss,    4. Muscle Loss-No significant muscle mass loss,    5. Fluid Accumulation-Mild fluid accumulation, Extremities  6.  Strength-Not measured    Nutrition Diagnosis:   · Problem: Increased nutrient needs  · Etiology: related to Acute injury/trauma     Signs and symptoms:  as evidenced by Presence of wounds    Nutrition Assessment:  · Subjective Assessment: Patient admitted with s/p open takedown with partial colectomy (9/4/18). Patient seen and reports that her appetite is good. Patient admits that she does drink up to two Boost daily at home. Offered ensure while here, however patient reports that she \"can't drink ensure\". Other ONS options discussed and patient agrees to try Magic Cups TID. Patient says that her bowel have not moved much yesterday or today yet. Note patient's potassium: 3.4. Note patient is on multivitamin.   · Wound Type: Surgical Wound  · Current Nutrition Therapies:  · Oral Diet Orders: Low Fiber   · Oral Diet intake: 51-75%, %  · Oral Nutrition Supplement (ONS) Orders: Frozen Oral Supplement (magic cup TID)  · ONS intake: Unable to assess (new order)  · Anthropometric Measures:  · Ht: 5' 3\" (160 cm)   · Current Body Wt: 162 lb (73.5 kg) (9/12/18, bedscale, trace edema BLE)  · Admission Body Wt: 162 lb (73.5 kg) (9/12/18, bedscale, + trace edema

## 2018-09-13 PROCEDURE — 97110 THERAPEUTIC EXERCISES: CPT

## 2018-09-13 PROCEDURE — 2709999900 HC NON-CHARGEABLE SUPPLY

## 2018-09-13 PROCEDURE — 6370000000 HC RX 637 (ALT 250 FOR IP): Performed by: EMERGENCY MEDICINE

## 2018-09-13 PROCEDURE — 97535 SELF CARE MNGMENT TRAINING: CPT

## 2018-09-13 PROCEDURE — 6360000002 HC RX W HCPCS: Performed by: EMERGENCY MEDICINE

## 2018-09-13 PROCEDURE — 97530 THERAPEUTIC ACTIVITIES: CPT

## 2018-09-13 PROCEDURE — 97116 GAIT TRAINING THERAPY: CPT

## 2018-09-13 PROCEDURE — 1290000000 HC SEMI PRIVATE OTHER R&B

## 2018-09-13 PROCEDURE — 99024 POSTOP FOLLOW-UP VISIT: CPT | Performed by: SURGERY

## 2018-09-13 RX ADMIN — SERTRALINE 50 MG: 50 TABLET, FILM COATED ORAL at 21:22

## 2018-09-13 RX ADMIN — HYDROCODONE BITARTRATE AND ACETAMINOPHEN 2 TABLET: 5; 325 TABLET ORAL at 09:10

## 2018-09-13 RX ADMIN — TIMOLOL MALEATE 1 DROP: 5 SOLUTION OPHTHALMIC at 09:09

## 2018-09-13 RX ADMIN — ENOXAPARIN SODIUM 40 MG: 40 INJECTION SUBCUTANEOUS at 09:09

## 2018-09-13 RX ADMIN — PANTOPRAZOLE SODIUM 40 MG: 40 TABLET, DELAYED RELEASE ORAL at 06:30

## 2018-09-13 RX ADMIN — HYDROCODONE BITARTRATE AND ACETAMINOPHEN 2 TABLET: 5; 325 TABLET ORAL at 21:23

## 2018-09-13 RX ADMIN — TIMOLOL MALEATE 1 DROP: 5 SOLUTION OPHTHALMIC at 21:24

## 2018-09-13 RX ADMIN — PREDNISONE 2 MG: 1 TABLET ORAL at 09:09

## 2018-09-13 RX ADMIN — DEXTRAN 70 AND HYPROMELLOSE 2910 1 DROP: 1; 3 SOLUTION/ DROPS OPHTHALMIC at 21:22

## 2018-09-13 RX ADMIN — SERTRALINE 50 MG: 50 TABLET, FILM COATED ORAL at 09:09

## 2018-09-13 RX ADMIN — CALCIUM CARBONATE-VITAMIN D TAB 500 MG-200 UNIT 1 TABLET: 500-200 TAB at 09:09

## 2018-09-13 RX ADMIN — METOPROLOL TARTRATE 12.5 MG: 25 TABLET ORAL at 21:23

## 2018-09-13 RX ADMIN — ISOSORBIDE MONONITRATE 30 MG: 30 TABLET ORAL at 09:09

## 2018-09-13 RX ADMIN — LEVOTHYROXINE SODIUM 88 MCG: 88 TABLET ORAL at 06:30

## 2018-09-13 RX ADMIN — METOPROLOL TARTRATE 12.5 MG: 25 TABLET ORAL at 09:09

## 2018-09-13 RX ADMIN — DEXTRAN 70 AND HYPROMELLOSE 2910 1 DROP: 1; 3 SOLUTION/ DROPS OPHTHALMIC at 09:09

## 2018-09-13 RX ADMIN — ALPRAZOLAM 0.5 MG: 0.5 TABLET ORAL at 21:23

## 2018-09-13 RX ADMIN — DEXTRAN 70 AND HYPROMELLOSE 2910 1 DROP: 1; 3 SOLUTION/ DROPS OPHTHALMIC at 17:48

## 2018-09-13 RX ADMIN — MULTIPLE VITAMINS W/ MINERALS TAB 1 TABLET: TAB at 09:09

## 2018-09-13 ASSESSMENT — PAIN SCALES - GENERAL
PAINLEVEL_OUTOF10: 8
PAINLEVEL_OUTOF10: 0
PAINLEVEL_OUTOF10: 8

## 2018-09-13 ASSESSMENT — PAIN DESCRIPTION - LOCATION
LOCATION: ABDOMEN
LOCATION: ABDOMEN

## 2018-09-13 ASSESSMENT — PAIN DESCRIPTION - PAIN TYPE: TYPE: SURGICAL PAIN

## 2018-09-13 ASSESSMENT — PAIN DESCRIPTION - ORIENTATION
ORIENTATION: MID;LOWER
ORIENTATION: MID

## 2018-09-13 ASSESSMENT — PAIN DESCRIPTION - FREQUENCY: FREQUENCY: INTERMITTENT

## 2018-09-13 ASSESSMENT — PAIN DESCRIPTION - ONSET: ONSET: ON-GOING

## 2018-09-13 ASSESSMENT — PAIN DESCRIPTION - DESCRIPTORS: DESCRIPTORS: ACHING

## 2018-09-13 NOTE — PROGRESS NOTES
PARASTOMAL HERNIA REPAIR performed by Patrick Justice MD at [address] [facility]  2002    UPPER GASTROINTESTINAL ENDOSCOPY  11/06       Restrictions/Precautions:  General Precautions, Fall Risk                    Other position/activity restrictions: Abdominal binder  Spinal: Lumbar Corset    Prior Level of Function:  ADL Assistance: Needs assistance  Homemaking Assistance: Needs assistance  Ambulation Assistance: Independent  Transfer Assistance: Independent  Additional Comments: Pt reports using a walker or cane to get around at home PLOF. Pt reports she has an aide 5x week that A with bathing & IADLs, present for 3 hours/day    Subjective:  Response To Previous Treatment: Patient with no complaints from previous session. Family / Caregiver Present: No  Comments: pt. very slow moving and reports feeling very tired ,requesting to sit up in b/s chair for supper end of session  Subjective: patient in  bed upon arrival, pleasant and agrees to therapy.  Motivated    Pain:   .  Pain Assessment  Pain Level: 0 (no pain reported)       Social/Functional:  Lives With: Alone  Type of Home: House  Home Layout: One level  Home Access: Stairs to enter with rails  Entrance Stairs - Number of Steps: 2  Entrance Stairs - Rails: Right  Home Equipment: 4 wheeled walker, Rolling walker, Cane, Lift chair     Objective:  Rolling to Left: Supervision  Supine to Sit: Stand by assistance (hob up ~20 degrees with rail)  Sit to Supine: Unable to assess  Scooting: Supervision    Transfers  Sit to Stand: Contact guard assistance-> sba  Stand to sit: Contact guard assistance-> sba  Toilet transfer with rolling walker, ETS with sba-> supervision       Ambulation 1  Surface: level tile;carpet (around obstacles in 8AB lobby)  Device: Rolling Walker  Other Apparatus: O2 (1L 02 with amb.)  Assistance: Contact guard assistance;Stand by assistance  Quality of Gait: CGA-> CLOSE SBA, very slow pace, decreased but equal step length B, step through pattern, fair heel strike B, slight toe out B,  slight forward flexed posture  Distance: 290' x 1,20' x 1  Comments: verbal cues to increase step length and improve heel strike B with good performance by patient. fatigued following distances         Balance  Comments: sba with standing balance at sink to wash/dry hands , face and to comb hair ,no unsteadiness    Exercises:  Exercises  Comments:  prior to oob pt. completed 15 reps each b le supine ap's quad/glute sets,saq, heelslides, hip abd/add all for strengthening,endurance     Activity Tolerance:  Activity Tolerance: Patient limited by fatigue;Patient limited by endurance  Activity Tolerance: good motivation, very pleasant    Assessment:     Discharge Recommendations: Continue to assess pending progress    Patient Education:  Patient Education: POC, transfers, bed mobility, gait, therex    Equipment Recommendations:  Equipment Needed: No    Safety:  Type of devices:  All fall risk precautions in place, Patient at risk for falls, Call light within reach, Gait belt, Chair alarm in place, Left in chair    Plan:  Times per week: 6x  Current Treatment Recommendations: Strengthening, Balance Training, Endurance Training, Functional Mobility Training, Transfer Training, Gait Training, Stair training, Patient/Caregiver Education & Training, Safety Education & Training, Home Exercise Program, Equipment Evaluation, Education, & procurement    Goals:  Patient goals : go home    Short term goals  Time Frame for Short term goals: 1 week  Short term goal 1: patient to perform bed mobility at S to get in and out of bed  Short term goal 2: patient to perform transfers to and from various surfaces at SBA to rise from bed or chair  Short term goal 3: patient to ambulate 125ft with RW at [facility] for household mobility  Short term goal 4: patient to negotiate 2 steps with single rail at [facility] for home access    Long term goals  Time Frame for Long term goals : 2 weeks  Long term goal 1: patient to perform bed mobility at mod I to get in and out of bed  Long term goal 2: patient to perform transfers to and from various surfaces at S to rise from bed or chair  Long term goal 3: patient to ambulate 175ft with RW at S for household and community mobility  Long term goal 4: patient to negotiate 2 steps with single rail at S for home access  Long term goal 5: patient to perform car transfer at [facility] for transportation to and from appointments

## 2018-09-13 NOTE — PROGRESS NOTES
complete mobility to/from bathroom & in hallway with SBA, RW, & 0-2 vcs for walker safety & posture  Short term goal 4: Pt will complete LE dressing with CGA & LH AE prn to increase indep with self care. Long term goals  Time Frame for Long term goals : 2 weeks  Long term goal 1: Pt will complete ADL routine with s/u to increase indep with self care. Long term goal 2: Pt will complete light IADL task with S to increase indep with warming up meals when aide is not present at home.

## 2018-09-13 NOTE — PROGRESS NOTES
Patient seen and evaluated. She states she is tolerating oral intake. She is having bowel movements. Wounds look okay. Still some serous drainage from her drain site in the right side of the abdomen. She asked Dr. Emma Aguilar if she can go home tomorrow. Continue therapies. Follow-up in the office 1 week after discharge with me.

## 2018-09-13 NOTE — PROGRESS NOTES
RECREATIONAL THERAPY  MISSED TREATMENT    [x]Transitional Care Unit  []Inpatient Rehabilitation    Date:  9/13/2018            Patient Name: Devorah Walker           MRN: [id number]  Acct: [de-identified]          YOB: 1933 (80 y.o.)       Gender: female   Diagnosis: s/p open takedown with partial colectomy  Physician: Referring Practitioner: Jose G Cueva MD    REASON FOR MISSED TREATMENT:    []Hold treatment per nursing request  []Patient refusal  []Family declined treatment  []Patient at testing and/or off the floor  []Patient unavailable, with PT/OT/nursing, etc.  [x]Other pt at first agreed to play bingo with peers in dining room and when I came back to get her she declined stating she is too tired and nauseated   Sebastien Joseph, [address] 9/13/2018

## 2018-09-13 NOTE — PROGRESS NOTES
Family Medicine Progress Notes/Coverage    Today's Date: 9/13/18  8E-65/065-A  Medical Record # [id number]  Account # [de-identified]      Ms. Rod admitted on 9/10/2018        Subjective / Interval History :     Can I go home tomorrow?   Feels good, eating, good bowel movement  Oxygen at  0.5 L NC  Reviewed notes, consults, laboratory and radiology results,    Objective:       Physical Exam:  Patient Vitals for the past 24 hrs:   BP Temp Temp src Pulse Resp SpO2   09/12/18 1945 (!) 111/54 98.1 °F (36.7 °C) Oral 62 18 95 %   09/12/18 0842 (!) 143/65 98 °F (36.7 °C) Oral 73 18 97 %       General Appearance:  Alert, cooperative, no distress, appears stated age   HEENT:  Neck:      Chest/Lungs:  Heart: CTA  RRR   Abdomen:  Back: Scanty serous fluid from the drain site but no infection   Extremities:  Neurological Exam: No edema  WNL       Assessment:     Admitting Diagnosis:    S/P open takedown with partial colectomy    Active Hospital Problems    Diagnosis Date Noted    Weakness generalized [R53.1] 09/10/2018     Priority: High    Parastomal hernia without obstruction or gangrene [K43.5] 09/04/2018     Priority: High    Diverticular stricture ([address] [K56.699]      Priority: High    Adhesion of intestine [K66.0]      Priority: High    Personal history of cardiac dysrhythmia [Z86.79]      Priority: Medium    Current chronic use of systemic steroids [Z79.52] 09/04/2018     Priority: Medium    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]      Priority: Medium    Steroid-induced hyperglycemia [R73.9, T38.0X5A] 04/19/2016     Priority: Medium    Hypothyroidism due to acquired atrophy of thyroid [E03.4] 03/03/2016     Priority: Medium    Temporal arteritis (HCC) [M31.6]      Priority: Medium    GERD (gastroesophageal reflux disease) [K21.9]      Priority: Medium    Coronary artery disease involving native coronary artery of native heart without angina pectoris [I25.10] 11/06/2017    Anxiety [F41.9] 06/29/2016    Breast cancer metastasized to liver [facility]) [C50.919, C78.7] 06/08/2016       Plan:     Discussed plans with the nursing staff  enzo Apodaca in AM  Home soon    Medications, Laboratories and Imaging results:    Scheduled Meds:   calcium-vitamin D  1 tablet Oral Daily    dextran 70-hypromellose  1 drop Both Eyes 4x Daily    enoxaparin  40 mg Subcutaneous Daily    isosorbide mononitrate  30 mg Oral Daily    levothyroxine  88 mcg Oral Daily    metoprolol tartrate  12.5 mg Oral BID    pantoprazole  40 mg Oral QAM AC    ppd   Does not apply Weekly    predniSONE  2 mg Oral Daily    sertraline  50 mg Oral BID    therapeutic multivitamin-minerals  1 tablet Oral Daily    timolol  1 drop Right Eye BID    tuberculin  5 Units Intradermal Weekly     Continuous Infusions:  PRN Meds:acetaminophen, HYDROcodone 5 mg - acetaminophen **OR** HYDROcodone 5 mg - acetaminophen, sodium chloride flush, albuterol, ALPRAZolam, ondansetron    Imaging:    Lab Review :    Lab Results   Component Value Date    WBC 8.3 09/08/2018    HGB 7.9 (L) 09/08/2018    HCT 26.8 (L) 09/08/2018    MCV 89.0 09/08/2018     09/08/2018     Lab Results   Component Value Date    CREATININE 0.4 09/09/2018    BUN 10 09/09/2018     09/09/2018    K 3.4 (L) 09/09/2018     09/09/2018    CO2 26 09/09/2018     Lab Results   Component Value Date    CKTOTAL 41 10/16/2016     Lab Results   Component Value Date    ALT 16 07/23/2018    AST 22 07/23/2018    ALKPHOS 36 (L) 07/23/2018    BILITOT 0.4 07/23/2018     Lab Results   Component Value Date    LIPASE 33.9 06/01/2018         Electronically signed by Radha Mosquera MD on 9/13/18 at 8:00 AM                                                                                           Eduardo Whiting M.D.

## 2018-09-14 LAB
ANION GAP SERPL CALCULATED.3IONS-SCNC: 9 MEQ/L (ref 8–16)
BUN BLDV-MCNC: 11 MG/DL (ref 7–22)
CALCIUM SERPL-MCNC: 8.3 MG/DL (ref 8.5–10.5)
CHLORIDE BLD-SCNC: 100 MEQ/L (ref 98–111)
CO2: 32 MEQ/L (ref 23–33)
CREAT SERPL-MCNC: 0.5 MG/DL (ref 0.4–1.2)
ERYTHROCYTE [DISTWIDTH] IN BLOOD BY AUTOMATED COUNT: 15.9 % (ref 11.5–14.5)
ERYTHROCYTE [DISTWIDTH] IN BLOOD BY AUTOMATED COUNT: 50.5 FL (ref 35–45)
GFR SERPL CREATININE-BSD FRML MDRD: > 90 ML/MIN/1.73M2
GLUCOSE BLD-MCNC: 102 MG/DL (ref 70–108)
HCT VFR BLD CALC: 26.1 % (ref 37–47)
HEMOGLOBIN: 7.9 GM/DL (ref 12–16)
MCH RBC QN AUTO: 26.3 PG (ref 26–33)
MCHC RBC AUTO-ENTMCNC: 30.3 GM/DL (ref 32.2–35.5)
MCV RBC AUTO: 87 FL (ref 81–99)
PLATELET # BLD: 242 THOU/MM3 (ref 130–400)
PMV BLD AUTO: 11 FL (ref 9.4–12.4)
POTASSIUM SERPL-SCNC: 3.4 MEQ/L (ref 3.5–5.2)
RBC # BLD: 3 MILL/MM3 (ref 4.2–5.4)
SODIUM BLD-SCNC: 141 MEQ/L (ref 135–145)
WBC # BLD: 9.3 THOU/MM3 (ref 4.8–10.8)

## 2018-09-14 PROCEDURE — 6370000000 HC RX 637 (ALT 250 FOR IP): Performed by: EMERGENCY MEDICINE

## 2018-09-14 PROCEDURE — 85027 COMPLETE CBC AUTOMATED: CPT

## 2018-09-14 PROCEDURE — 97530 THERAPEUTIC ACTIVITIES: CPT

## 2018-09-14 PROCEDURE — 80048 BASIC METABOLIC PNL TOTAL CA: CPT

## 2018-09-14 PROCEDURE — 1290000000 HC SEMI PRIVATE OTHER R&B

## 2018-09-14 PROCEDURE — 97110 THERAPEUTIC EXERCISES: CPT

## 2018-09-14 PROCEDURE — 2709999900 HC NON-CHARGEABLE SUPPLY

## 2018-09-14 PROCEDURE — 36415 COLL VENOUS BLD VENIPUNCTURE: CPT

## 2018-09-14 PROCEDURE — 97116 GAIT TRAINING THERAPY: CPT

## 2018-09-14 PROCEDURE — 6360000002 HC RX W HCPCS: Performed by: EMERGENCY MEDICINE

## 2018-09-14 RX ORDER — POTASSIUM CHLORIDE 20 MEQ/1
40 TABLET, EXTENDED RELEASE ORAL ONCE
Status: COMPLETED | OUTPATIENT
Start: 2018-09-14 | End: 2018-09-14

## 2018-09-14 RX ADMIN — HYDROCODONE BITARTRATE AND ACETAMINOPHEN 2 TABLET: 5; 325 TABLET ORAL at 10:10

## 2018-09-14 RX ADMIN — ALPRAZOLAM 0.5 MG: 0.5 TABLET ORAL at 21:18

## 2018-09-14 RX ADMIN — CALCIUM CARBONATE-VITAMIN D TAB 500 MG-200 UNIT 1 TABLET: 500-200 TAB at 09:00

## 2018-09-14 RX ADMIN — PANTOPRAZOLE SODIUM 40 MG: 40 TABLET, DELAYED RELEASE ORAL at 05:55

## 2018-09-14 RX ADMIN — DEXTRAN 70 AND HYPROMELLOSE 2910 1 DROP: 1; 3 SOLUTION/ DROPS OPHTHALMIC at 09:03

## 2018-09-14 RX ADMIN — ACETAMINOPHEN 650 MG: 325 TABLET ORAL at 05:57

## 2018-09-14 RX ADMIN — ENOXAPARIN SODIUM 40 MG: 40 INJECTION SUBCUTANEOUS at 09:00

## 2018-09-14 RX ADMIN — SERTRALINE 50 MG: 50 TABLET, FILM COATED ORAL at 21:22

## 2018-09-14 RX ADMIN — METOPROLOL TARTRATE 12.5 MG: 25 TABLET ORAL at 21:21

## 2018-09-14 RX ADMIN — SERTRALINE 50 MG: 50 TABLET, FILM COATED ORAL at 09:00

## 2018-09-14 RX ADMIN — METOPROLOL TARTRATE 12.5 MG: 25 TABLET ORAL at 09:00

## 2018-09-14 RX ADMIN — DEXTRAN 70 AND HYPROMELLOSE 2910 1 DROP: 1; 3 SOLUTION/ DROPS OPHTHALMIC at 21:31

## 2018-09-14 RX ADMIN — LEVOTHYROXINE SODIUM 88 MCG: 88 TABLET ORAL at 05:55

## 2018-09-14 RX ADMIN — POTASSIUM CHLORIDE 40 MEQ: 20 TABLET, EXTENDED RELEASE ORAL at 09:00

## 2018-09-14 RX ADMIN — PREDNISONE 2 MG: 1 TABLET ORAL at 09:00

## 2018-09-14 RX ADMIN — TIMOLOL MALEATE 1 DROP: 5 SOLUTION OPHTHALMIC at 09:03

## 2018-09-14 RX ADMIN — TIMOLOL MALEATE 1 DROP: 5 SOLUTION OPHTHALMIC at 21:31

## 2018-09-14 RX ADMIN — ISOSORBIDE MONONITRATE 30 MG: 30 TABLET ORAL at 09:00

## 2018-09-14 RX ADMIN — MULTIPLE VITAMINS W/ MINERALS TAB 1 TABLET: TAB at 09:00

## 2018-09-14 RX ADMIN — HYDROCODONE BITARTRATE AND ACETAMINOPHEN 2 TABLET: 5; 325 TABLET ORAL at 21:17

## 2018-09-14 RX ADMIN — DEXTRAN 70 AND HYPROMELLOSE 2910 1 DROP: 1; 3 SOLUTION/ DROPS OPHTHALMIC at 17:38

## 2018-09-14 ASSESSMENT — PAIN DESCRIPTION - ONSET: ONSET: ON-GOING

## 2018-09-14 ASSESSMENT — PAIN SCALES - GENERAL
PAINLEVEL_OUTOF10: 7
PAINLEVEL_OUTOF10: 7
PAINLEVEL_OUTOF10: 8
PAINLEVEL_OUTOF10: 7
PAINLEVEL_OUTOF10: 0
PAINLEVEL_OUTOF10: 5
PAINLEVEL_OUTOF10: 7

## 2018-09-14 ASSESSMENT — PAIN DESCRIPTION - PROGRESSION: CLINICAL_PROGRESSION: NOT CHANGED

## 2018-09-14 ASSESSMENT — PAIN DESCRIPTION - FREQUENCY: FREQUENCY: CONTINUOUS

## 2018-09-14 ASSESSMENT — PAIN DESCRIPTION - DESCRIPTORS: DESCRIPTORS: JABBING;ACHING

## 2018-09-14 ASSESSMENT — PAIN DESCRIPTION - ORIENTATION
ORIENTATION: MID;LOWER
ORIENTATION: MID

## 2018-09-14 ASSESSMENT — PAIN DESCRIPTION - LOCATION
LOCATION: ABDOMEN
LOCATION: ABDOMEN

## 2018-09-14 NOTE — PROGRESS NOTES
Sandra Copeland 60  INPATIENT OCCUPATIONAL THERAPY  [facility] TCU 8E  DAILY NOTE    Time:  Time In: 6195  Time Out: 1026  Timed Code Treatment Minutes: 40 Minutes  Minutes: 40          Date: 2018  Patient Name: Niles Fowler,   Gender: female      Room: [facility]65/065-A  MRN: [id number]  : 1933  (80 y.o.)  Referring Practitioner: Opal Kolb MD  Diagnosis: s/p open takedown with partial colectomy  Additional Pertinent Hx: s/p COLOSTOMY REVERSAL AND PARASTOMAL HERNIA REPAIR, partial colectomy, small bowel resection on 18.  Pt admitted to TCU 9/10    Past Medical History:   Diagnosis Date    Anxiety     Blind right eye     Breast cancer metastasized to liver ([address] 05/10/2016    Liver lesion noted     Carotid stenosis      Left ICA 25%    Colostomy in place [facility])     Degenerative arthritis of cervical spine     GERD (gastroesophageal reflux disease)     Hypercholesteremia     Hypoestrogenism     Hypothyroidism     Lumbago     MDRO (multiple drug resistant organisms) resistance     pt stated cleared    Personal history of cardiac dysrhythmia     Sigmoid diverticulosis     Spondylolisthesis of lumbosacral region     Steroid-induced hyperglycemia     Subclavian artery stenosis (HCC)     left    SVT (supraventricular tachycardia) ([address]     Temporal arteritis ([address]     Vertebral artery occlusion     left    Vitamin D deficiency 2017     Past Surgical History:   Procedure Laterality Date    CARDIAC CATHETERIZATION      CATARACT REMOVAL  right ; left     CHOLECYSTECTOMY      COLONOSCOPY      DILATION AND CURETTAGE OF UTERUS      EYE SURGERY      EYE SURGERY Right 2017    Dr Lopez in [address]      partial    OTHER SURGICAL HISTORY  16    robotic assisted laparoscopic anterior resection    DC OFFICE/OUTPT VISIT,PROCEDURE ONLY N/A 2018    COLOSTOMY REVERSAL AND PARASTOMAL HERNIA REPAIR performed by Mobility  Functional - Mobility Device: Rolling Walker  Activity: Other  Assist Level: Contact guard assistance  Functional Mobility Comments: To/from therapy gym and throughout therapy kitchen at fair pace. No LOB noted, can be unsteady at times. Required seated rest break after each trial of mobility       Additional Activities: Other  Additional Activities: Patient completed functional mobility around therapy kitchen to retrieve cones in graded heights using RW requiring CGA for balance with no LOB noted. Patient required moderate verbal cues for proper placement of walker to retrieve items. Required seated rest break after task. Type of ROM/Therapeutic Exercise: Free weights  Comment: Patient completed BUE exercises x15 reps x1 set in all joints/planes using 1# dumb bell seated in chair. Required rest breaks after each exercise.  Completed exercises to increase strength and activity tolerance required for ADL routine and toilet transfers       Activity Tolerance:  Activity Tolerance: Patient limited by fatigue;Patient limited by pain    Assessment:     Performance deficits / Impairments: Decreased functional mobility , Decreased endurance, Decreased ADL status, Decreased balance, Decreased safe awareness  Prognosis: Good  Discharge Recommendations: Home with Home health OT, Continue to assess pending progress (home with aides and [facility] OT )    Patient Education:  Patient Education: safety with transfers and mobility, safety within kitchen using RW    Equipment Recommendations:  Equipment Needed: Yes  Other: potential need for LHAE    Safety:  Safety Devices in place: Yes  Type of devices: Gait belt, Call light within reach, Bed alarm in place, Left in bed    Plan:  Times per week: 6x  Current Treatment Recommendations: Balance Training, Functional Mobility Training, Endurance Training, Safety Education & Training, Self-Care / ADL, Strengthening, Patient/Caregiver Education & Training    Goals:       Short

## 2018-09-15 PROCEDURE — 1290000000 HC SEMI PRIVATE OTHER R&B

## 2018-09-15 PROCEDURE — 6360000002 HC RX W HCPCS: Performed by: EMERGENCY MEDICINE

## 2018-09-15 PROCEDURE — 6370000000 HC RX 637 (ALT 250 FOR IP): Performed by: EMERGENCY MEDICINE

## 2018-09-15 RX ADMIN — SERTRALINE 50 MG: 50 TABLET, FILM COATED ORAL at 09:55

## 2018-09-15 RX ADMIN — ALPRAZOLAM 0.5 MG: 0.5 TABLET ORAL at 21:52

## 2018-09-15 RX ADMIN — CALCIUM CARBONATE-VITAMIN D TAB 500 MG-200 UNIT 1 TABLET: 500-200 TAB at 09:55

## 2018-09-15 RX ADMIN — HYDROCODONE BITARTRATE AND ACETAMINOPHEN 2 TABLET: 5; 325 TABLET ORAL at 06:59

## 2018-09-15 RX ADMIN — DEXTRAN 70 AND HYPROMELLOSE 2910 1 DROP: 1; 3 SOLUTION/ DROPS OPHTHALMIC at 09:55

## 2018-09-15 RX ADMIN — DEXTRAN 70 AND HYPROMELLOSE 2910 1 DROP: 1; 3 SOLUTION/ DROPS OPHTHALMIC at 21:50

## 2018-09-15 RX ADMIN — PANTOPRAZOLE SODIUM 40 MG: 40 TABLET, DELAYED RELEASE ORAL at 07:02

## 2018-09-15 RX ADMIN — HYDROCODONE BITARTRATE AND ACETAMINOPHEN 2 TABLET: 5; 325 TABLET ORAL at 19:22

## 2018-09-15 RX ADMIN — DEXTRAN 70 AND HYPROMELLOSE 2910 1 DROP: 1; 3 SOLUTION/ DROPS OPHTHALMIC at 19:20

## 2018-09-15 RX ADMIN — LEVOTHYROXINE SODIUM 88 MCG: 88 TABLET ORAL at 07:02

## 2018-09-15 RX ADMIN — SERTRALINE 50 MG: 50 TABLET, FILM COATED ORAL at 21:50

## 2018-09-15 RX ADMIN — ISOSORBIDE MONONITRATE 30 MG: 30 TABLET ORAL at 09:55

## 2018-09-15 RX ADMIN — METOPROLOL TARTRATE 12.5 MG: 25 TABLET ORAL at 21:50

## 2018-09-15 RX ADMIN — ENOXAPARIN SODIUM 40 MG: 40 INJECTION SUBCUTANEOUS at 09:55

## 2018-09-15 RX ADMIN — TIMOLOL MALEATE 1 DROP: 5 SOLUTION OPHTHALMIC at 09:55

## 2018-09-15 RX ADMIN — PREDNISONE 2 MG: 1 TABLET ORAL at 09:55

## 2018-09-15 RX ADMIN — TIMOLOL MALEATE 1 DROP: 5 SOLUTION OPHTHALMIC at 21:50

## 2018-09-15 RX ADMIN — MULTIPLE VITAMINS W/ MINERALS TAB 1 TABLET: TAB at 09:55

## 2018-09-15 RX ADMIN — METOPROLOL TARTRATE 12.5 MG: 25 TABLET ORAL at 09:55

## 2018-09-15 ASSESSMENT — PAIN SCALES - GENERAL
PAINLEVEL_OUTOF10: 8
PAINLEVEL_OUTOF10: 0
PAINLEVEL_OUTOF10: 8

## 2018-09-15 NOTE — PROGRESS NOTES
Family Medicine Progress Notes/Coverage    Today's Date: 9/15/18  8E-65/065-A  Medical Record # [id number]  Account # [de-identified]      Ms. Rod admitted on 9/10/2018        Subjective / Interval History :     doing well, No chest pain , No fatigue.  No nausea nor abdominal pain  Still on Oxygen   Reviewed notes, consults, laboratory and radiology results,    Objective:       Physical Exam:  Patient Vitals for the past 24 hrs:   BP Temp Temp src Pulse Resp SpO2   09/15/18 0706 (!) 156/65 - - 63 17 94 %   09/15/18 0453 - 98.3 °F (36.8 °C) - 68 19 -   09/14/18 2345 (!) 154/68 - - - - -   09/14/18 2116 (!) 175/74 98.3 °F (36.8 °C) Oral 68 19 96 %       General Appearance:  Alert, cooperative, no distress, appears stated age   HEENT:  Neck:      Chest/Lungs:  Heart: + crackles bases  RRR   Abdomen:  Back: Soft non tender   Extremities:  Neurological Exam: No edema  WNL       Assessment:     Admitting Diagnosis:    S/P open takedown with partial colectomy    Active Hospital Problems    Diagnosis Date Noted    Weakness generalized [R53.1] 09/10/2018     Priority: High    Parastomal hernia without obstruction or gangrene [K43.5] 09/04/2018     Priority: High    Diverticular stricture [address] [K56.699]      Priority: High    Adhesion of intestine [K66.0]      Priority: High    Personal history of cardiac dysrhythmia [Z86.79]      Priority: Medium    Current chronic use of systemic steroids [Z79.52] 09/04/2018     Priority: Medium    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]      Priority: Medium    Steroid-induced hyperglycemia [R73.9, T38.0X5A] 04/19/2016     Priority: Medium    Hypothyroidism due to acquired atrophy of thyroid [E03.4] 03/03/2016     Priority: Medium    Temporal arteritis (HCC) [M31.6]      Priority: Medium    GERD (gastroesophageal reflux disease) [K21.9]      Priority:

## 2018-09-16 LAB
ANION GAP SERPL CALCULATED.3IONS-SCNC: 9 MEQ/L (ref 8–16)
BUN BLDV-MCNC: 10 MG/DL (ref 7–22)
CALCIUM SERPL-MCNC: 8.5 MG/DL (ref 8.5–10.5)
CHLORIDE BLD-SCNC: 101 MEQ/L (ref 98–111)
CO2: 33 MEQ/L (ref 23–33)
CREAT SERPL-MCNC: 0.6 MG/DL (ref 0.4–1.2)
ERYTHROCYTE [DISTWIDTH] IN BLOOD BY AUTOMATED COUNT: 15.9 % (ref 11.5–14.5)
ERYTHROCYTE [DISTWIDTH] IN BLOOD BY AUTOMATED COUNT: 49.7 FL (ref 35–45)
GFR SERPL CREATININE-BSD FRML MDRD: > 90 ML/MIN/1.73M2
GLUCOSE BLD-MCNC: 93 MG/DL (ref 70–108)
HCT VFR BLD CALC: 26.1 % (ref 37–47)
HEMOGLOBIN: 7.8 GM/DL (ref 12–16)
MCH RBC QN AUTO: 25.7 PG (ref 26–33)
MCHC RBC AUTO-ENTMCNC: 29.9 GM/DL (ref 32.2–35.5)
MCV RBC AUTO: 85.9 FL (ref 81–99)
PLATELET # BLD: 258 THOU/MM3 (ref 130–400)
PMV BLD AUTO: 10.9 FL (ref 9.4–12.4)
POTASSIUM REFLEX MAGNESIUM: 4.3 MEQ/L (ref 3.5–5.2)
RBC # BLD: 3.04 MILL/MM3 (ref 4.2–5.4)
SODIUM BLD-SCNC: 143 MEQ/L (ref 135–145)
WBC # BLD: 10.1 THOU/MM3 (ref 4.8–10.8)

## 2018-09-16 PROCEDURE — 2700000000 HC OXYGEN THERAPY PER DAY

## 2018-09-16 PROCEDURE — 97116 GAIT TRAINING THERAPY: CPT

## 2018-09-16 PROCEDURE — 36415 COLL VENOUS BLD VENIPUNCTURE: CPT

## 2018-09-16 PROCEDURE — 6370000000 HC RX 637 (ALT 250 FOR IP): Performed by: EMERGENCY MEDICINE

## 2018-09-16 PROCEDURE — 97530 THERAPEUTIC ACTIVITIES: CPT

## 2018-09-16 PROCEDURE — 1290000000 HC SEMI PRIVATE OTHER R&B

## 2018-09-16 PROCEDURE — 6360000002 HC RX W HCPCS: Performed by: EMERGENCY MEDICINE

## 2018-09-16 PROCEDURE — 97110 THERAPEUTIC EXERCISES: CPT

## 2018-09-16 PROCEDURE — 85027 COMPLETE CBC AUTOMATED: CPT

## 2018-09-16 PROCEDURE — 80048 BASIC METABOLIC PNL TOTAL CA: CPT

## 2018-09-16 RX ADMIN — DEXTRAN 70 AND HYPROMELLOSE 2910 1 DROP: 1; 3 SOLUTION/ DROPS OPHTHALMIC at 17:47

## 2018-09-16 RX ADMIN — HYDROCODONE BITARTRATE AND ACETAMINOPHEN 2 TABLET: 5; 325 TABLET ORAL at 17:51

## 2018-09-16 RX ADMIN — ENOXAPARIN SODIUM 40 MG: 40 INJECTION SUBCUTANEOUS at 08:10

## 2018-09-16 RX ADMIN — HYDROCODONE BITARTRATE AND ACETAMINOPHEN 2 TABLET: 5; 325 TABLET ORAL at 10:12

## 2018-09-16 RX ADMIN — CALCIUM CARBONATE-VITAMIN D TAB 500 MG-200 UNIT 1 TABLET: 500-200 TAB at 08:10

## 2018-09-16 RX ADMIN — HYDROCODONE BITARTRATE AND ACETAMINOPHEN 2 TABLET: 5; 325 TABLET ORAL at 06:03

## 2018-09-16 RX ADMIN — TIMOLOL MALEATE 1 DROP: 5 SOLUTION OPHTHALMIC at 08:10

## 2018-09-16 RX ADMIN — DEXTRAN 70 AND HYPROMELLOSE 2910 1 DROP: 1; 3 SOLUTION/ DROPS OPHTHALMIC at 20:34

## 2018-09-16 RX ADMIN — PANTOPRAZOLE SODIUM 40 MG: 40 TABLET, DELAYED RELEASE ORAL at 06:03

## 2018-09-16 RX ADMIN — ISOSORBIDE MONONITRATE 30 MG: 30 TABLET ORAL at 08:11

## 2018-09-16 RX ADMIN — ALPRAZOLAM 0.5 MG: 0.5 TABLET ORAL at 20:34

## 2018-09-16 RX ADMIN — MULTIPLE VITAMINS W/ MINERALS TAB 1 TABLET: TAB at 08:09

## 2018-09-16 RX ADMIN — LEVOTHYROXINE SODIUM 88 MCG: 88 TABLET ORAL at 06:03

## 2018-09-16 RX ADMIN — HYDROCODONE BITARTRATE AND ACETAMINOPHEN 2 TABLET: 5; 325 TABLET ORAL at 23:40

## 2018-09-16 RX ADMIN — DEXTRAN 70 AND HYPROMELLOSE 2910 1 DROP: 1; 3 SOLUTION/ DROPS OPHTHALMIC at 08:10

## 2018-09-16 RX ADMIN — PREDNISONE 2 MG: 1 TABLET ORAL at 08:10

## 2018-09-16 RX ADMIN — TIMOLOL MALEATE 1 DROP: 5 SOLUTION OPHTHALMIC at 20:34

## 2018-09-16 RX ADMIN — METOPROLOL TARTRATE 12.5 MG: 25 TABLET ORAL at 20:33

## 2018-09-16 RX ADMIN — SERTRALINE 50 MG: 50 TABLET, FILM COATED ORAL at 20:33

## 2018-09-16 RX ADMIN — SERTRALINE 50 MG: 50 TABLET, FILM COATED ORAL at 08:09

## 2018-09-16 ASSESSMENT — PAIN SCALES - GENERAL
PAINLEVEL_OUTOF10: 8
PAINLEVEL_OUTOF10: 7
PAINLEVEL_OUTOF10: 0
PAINLEVEL_OUTOF10: 7
PAINLEVEL_OUTOF10: 8

## 2018-09-16 ASSESSMENT — PAIN DESCRIPTION - PAIN TYPE: TYPE: SURGICAL PAIN

## 2018-09-16 ASSESSMENT — PAIN DESCRIPTION - LOCATION: LOCATION: ABDOMEN

## 2018-09-16 NOTE — PROGRESS NOTES
strength for improved functional mobility. Activity Tolerance:  Activity Tolerance: Patient Tolerated treatment well    Assessment: Body structures, Functions, Activity limitations: Decreased functional mobility , Decreased strength, Decreased endurance, Decreased balance  Assessment: Patient tolerated session well. Patient increased ambulation distance this session. Patient would benefit from continued skilled physical therapy to increase strength for improved functional mobility. Discharge Recommendations: Continue to assess pending progress    Patient Education:  Patient Education: therex, ambulation    Equipment Recommendations:  Equipment Needed: No    Safety:  Type of devices:  All fall risk precautions in place, Call light within reach, Chair alarm in place, Gait belt, Patient at risk for falls, Left in chair    Plan:  Times per week: 6x  Current Treatment Recommendations: Strengthening, Balance Training, Endurance Training, Functional Mobility Training, Transfer Training, Gait Training, Stair training, Patient/Caregiver Education & Training, Safety Education & Training, Home Exercise Program, Equipment Evaluation, Education, & procurement    Goals:  Patient goals : go home    Short term goals  Time Frame for Short term goals: 1 week  Short term goal 1: patient to perform bed mobility at S to get in and out of bed  Short term goal 2: patient to perform transfers to and from various surfaces at SBA to rise from bed or chair  Short term goal 3: patient to ambulate 125ft with RW at [facility] for household mobility  Short term goal 4: patient to negotiate 2 steps with single rail at [facility] for home access    Long term goals  Time Frame for Long term goals : 2 weeks  Long term goal 1: patient to perform bed mobility at mod I to get in and out of bed  Long term goal 2: patient to perform transfers to and from various surfaces at S to rise from bed or chair  Long term goal 3: patient to ambulate 175ft with RW

## 2018-09-16 NOTE — PROGRESS NOTES
Sandra Copeland 60  INPATIENT OCCUPATIONAL THERAPY  [facility] TCU 8E  DAILY NOTE    Time:  Time In: 8303  Time Out: 1157  Timed Code Treatment Minutes: 28 Minutes  Minutes: 32          Date: 2018  Patient Name: Gino Ramos,   Gender: female      Room: [facility]/065-A  MRN: [id number]  : 1933  (80 y.o.)  Referring Practitioner: Noe Dailey MD  Diagnosis: s/p open takedown with partial colectomy  Additional Pertinent Hx: s/p COLOSTOMY REVERSAL AND PARASTOMAL HERNIA REPAIR, partial colectomy, small bowel resection on 18.  Pt admitted to TCU 9/10    Past Medical History:   Diagnosis Date    Anxiety     Blind right eye     Breast cancer metastasized to liver ([address] 05/10/2016    Liver lesion noted     Carotid stenosis      Left ICA 25%    Colostomy in place [facility])     Degenerative arthritis of cervical spine     GERD (gastroesophageal reflux disease)     Hypercholesteremia     Hypoestrogenism     Hypothyroidism     Lumbago     MDRO (multiple drug resistant organisms) resistance     pt stated cleared    Personal history of cardiac dysrhythmia     Sigmoid diverticulosis     Spondylolisthesis of lumbosacral region     Steroid-induced hyperglycemia     Subclavian artery stenosis (HCC)     left    SVT (supraventricular tachycardia) ([address]     Temporal arteritis ([address]     Vertebral artery occlusion     left    Vitamin D deficiency 2017     Past Surgical History:   Procedure Laterality Date    CARDIAC CATHETERIZATION      CATARACT REMOVAL  right ; left     CHOLECYSTECTOMY      COLONOSCOPY      DILATION AND CURETTAGE OF UTERUS      EYE SURGERY      EYE SURGERY Right 2017    Dr Lopez in [address]      partial    OTHER SURGICAL HISTORY  16    robotic assisted laparoscopic anterior resection    [place] OFFICE/OUTPT VISIT,PROCEDURE ONLY N/A 2018    COLOSTOMY REVERSAL AND PARASTOMAL HERNIA REPAIR performed by Luis Brown MD at [address] [facility]  2002    UPPER GASTROINTESTINAL ENDOSCOPY  11/06       Restrictions/Precautions:  General Precautions, Fall Risk                    Other position/activity restrictions: Abdominal binder  Spinal: Lumbar Corset    Prior Level of Function:  ADL Assistance: Needs assistance  Homemaking Assistance: Needs assistance  Ambulation Assistance: Independent  Transfer Assistance: Independent  Additional Comments: Pt reports using a walker or cane to get around at home PLOF. Pt reports she has an aide 5x week that A with bathing & IADLs, present for 3 hours/day    Subjective       Subjective: In bed upon arrival, agreeable to OT session    Overall Orientation Status: Within Functional Limits         Pain:  Pain Assessment  Patient Currently in Pain: Yes  Pain Assessment: 0-10  Pain Level: 8  Pain Type: Surgical pain  Pain Location: Abdomen       Objective  Overall Cognitive Status: Exceptions      ADL  Grooming: Contact guard assistance (Combed hair standing sinkside)     Bed mobility  Supine to Sit: Stand by assistance (HOB elevated, used bedrail and increased time)    Transfers  Sit to stand: Contact guard assistance (EOB and arm chair)  Stand to sit: Contact guard assistance       Balance  Standing Balance: Contact guard assistance     Time: x 2 minutes for grooming     Functional Mobility  Functional - Mobility Device: Rolling Walker  Assist Level: Contact guard assistance  Functional Mobility Comments: To/from therapy gym with No LOB      Comment: Patient completed BUE exercises x15 reps x1 set in all joints/planes using theraband seated in chair. Required rest breaks after each exercise.  Completed exercises to increase strength and activity tolerance required for ADL routine and toilet transfers       Activity Tolerance:  Activity Tolerance: Patient limited by fatigue;Patient limited by pain    Assessment:     Performance deficits / Impairments: Decreased functional mobility , Decreased endurance, Decreased ADL status, Decreased balance, Decreased safe awareness  Prognosis: Good  Discharge Recommendations: Home with Home health OT, Continue to assess pending progress (home with aides and [facility] OT )    Patient Education:  Patient Education: safety with transfers and mobility, UE exercise    Equipment Recommendations:  Equipment Needed: Yes  Other: potential need for LHAE    Safety:  Safety Devices in place: Yes  Type of devices: Gait belt (left sitting on toilet with pull cord)    Plan:  Times per week: 6x  Current Treatment Recommendations: Balance Training, Functional Mobility Training, Endurance Training, Safety Education & Training, Self-Care / ADL, Strengthening, Patient/Caregiver Education & Training    Goals:     Short term goals  Time Frame for Short term goals: 1 week  Short term goal 1: Pt will complete dynamic standing task with SBA x 4 min for increased ease of sinkside grooming  Short term goal 2: Pt will complete various t/fs including toilet with SBA & 0 vcs for safety/technique to increase indep with toileting routine. Short term goal 3: Pt will complete mobility to/from bathroom & in hallway with SBA, RW, & 0-2 vcs for walker safety & posture  Short term goal 4: Pt will complete LE dressing with CGA & LH AE prn to increase indep with self care. Long term goals  Time Frame for Long term goals : 2 weeks  Long term goal 1: Pt will complete ADL routine with s/u to increase indep with self care. Long term goal 2: Pt will complete light IADL task with S to increase indep with warming up meals when aide is not present at home.

## 2018-09-17 PROCEDURE — 94761 N-INVAS EAR/PLS OXIMETRY MLT: CPT

## 2018-09-17 PROCEDURE — 2700000000 HC OXYGEN THERAPY PER DAY

## 2018-09-17 PROCEDURE — 97116 GAIT TRAINING THERAPY: CPT

## 2018-09-17 PROCEDURE — 97530 THERAPEUTIC ACTIVITIES: CPT

## 2018-09-17 PROCEDURE — 97110 THERAPEUTIC EXERCISES: CPT

## 2018-09-17 PROCEDURE — 6360000002 HC RX W HCPCS: Performed by: EMERGENCY MEDICINE

## 2018-09-17 PROCEDURE — 0220000000 HC SKILLED NURSING FACILITY

## 2018-09-17 PROCEDURE — 6370000000 HC RX 637 (ALT 250 FOR IP): Performed by: EMERGENCY MEDICINE

## 2018-09-17 PROCEDURE — 1290000000 HC SEMI PRIVATE OTHER R&B

## 2018-09-17 PROCEDURE — 97535 SELF CARE MNGMENT TRAINING: CPT

## 2018-09-17 RX ADMIN — DEXTRAN 70 AND HYPROMELLOSE 2910 1 DROP: 1; 3 SOLUTION/ DROPS OPHTHALMIC at 20:31

## 2018-09-17 RX ADMIN — SERTRALINE 50 MG: 50 TABLET, FILM COATED ORAL at 09:20

## 2018-09-17 RX ADMIN — PANTOPRAZOLE SODIUM 40 MG: 40 TABLET, DELAYED RELEASE ORAL at 06:21

## 2018-09-17 RX ADMIN — DEXTRAN 70 AND HYPROMELLOSE 2910 1 DROP: 1; 3 SOLUTION/ DROPS OPHTHALMIC at 14:20

## 2018-09-17 RX ADMIN — METOPROLOL TARTRATE 12.5 MG: 25 TABLET ORAL at 20:33

## 2018-09-17 RX ADMIN — ISOSORBIDE MONONITRATE 30 MG: 30 TABLET ORAL at 09:20

## 2018-09-17 RX ADMIN — METOPROLOL TARTRATE 12.5 MG: 25 TABLET ORAL at 09:20

## 2018-09-17 RX ADMIN — HYDROCODONE BITARTRATE AND ACETAMINOPHEN 2 TABLET: 5; 325 TABLET ORAL at 09:20

## 2018-09-17 RX ADMIN — LEVOTHYROXINE SODIUM 88 MCG: 88 TABLET ORAL at 06:21

## 2018-09-17 RX ADMIN — MULTIPLE VITAMINS W/ MINERALS TAB 1 TABLET: TAB at 09:20

## 2018-09-17 RX ADMIN — CALCIUM CARBONATE-VITAMIN D TAB 500 MG-200 UNIT 1 TABLET: 500-200 TAB at 09:20

## 2018-09-17 RX ADMIN — ALPRAZOLAM 0.5 MG: 0.5 TABLET ORAL at 20:33

## 2018-09-17 RX ADMIN — TIMOLOL MALEATE 1 DROP: 5 SOLUTION OPHTHALMIC at 20:34

## 2018-09-17 RX ADMIN — HYDROCODONE BITARTRATE AND ACETAMINOPHEN 2 TABLET: 5; 325 TABLET ORAL at 14:19

## 2018-09-17 RX ADMIN — TIMOLOL MALEATE 1 DROP: 5 SOLUTION OPHTHALMIC at 09:21

## 2018-09-17 RX ADMIN — PREDNISONE 2 MG: 1 TABLET ORAL at 09:20

## 2018-09-17 RX ADMIN — DEXTRAN 70 AND HYPROMELLOSE 2910 1 DROP: 1; 3 SOLUTION/ DROPS OPHTHALMIC at 09:21

## 2018-09-17 RX ADMIN — ACETAMINOPHEN 650 MG: 325 TABLET ORAL at 20:33

## 2018-09-17 RX ADMIN — SERTRALINE 50 MG: 50 TABLET, FILM COATED ORAL at 20:33

## 2018-09-17 RX ADMIN — ENOXAPARIN SODIUM 40 MG: 40 INJECTION SUBCUTANEOUS at 09:20

## 2018-09-17 ASSESSMENT — PAIN DESCRIPTION - DESCRIPTORS
DESCRIPTORS: ACHING
DESCRIPTORS: ACHING

## 2018-09-17 ASSESSMENT — PAIN SCALES - GENERAL
PAINLEVEL_OUTOF10: 8
PAINLEVEL_OUTOF10: 5
PAINLEVEL_OUTOF10: 8
PAINLEVEL_OUTOF10: 8
PAINLEVEL_OUTOF10: 7

## 2018-09-17 ASSESSMENT — PAIN DESCRIPTION - FREQUENCY
FREQUENCY: INTERMITTENT
FREQUENCY: INTERMITTENT

## 2018-09-17 ASSESSMENT — PAIN DESCRIPTION - LOCATION
LOCATION: ABDOMEN

## 2018-09-17 ASSESSMENT — PAIN DESCRIPTION - PROGRESSION: CLINICAL_PROGRESSION: GRADUALLY WORSENING

## 2018-09-17 ASSESSMENT — PAIN DESCRIPTION - ONSET
ONSET: GRADUAL
ONSET: ON-GOING

## 2018-09-17 ASSESSMENT — PAIN DESCRIPTION - ORIENTATION
ORIENTATION: MID;LOWER
ORIENTATION: MID;LOWER

## 2018-09-17 ASSESSMENT — PAIN DESCRIPTION - PAIN TYPE
TYPE: SURGICAL PAIN
TYPE: SURGICAL PAIN

## 2018-09-17 NOTE — PROGRESS NOTES
unsteady at times- requiring assistance for managing O2 tubing. Bed mobility  Rolling to Left: Supervision  Supine to Sit: Stand by assistance  Scooting: Supervision    Transfers  Sit to stand: Stand by assistance  Stand to sit: Stand by assistance  Transfer Comments: Cueing provided for proper hand placement during transfers  Toilet Transfers  Equipment Used: Raised toilet seat with rails  Toilet Transfer: Stand by assistance    Balance  Sitting Balance: Supervision  Standing Balance: Stand by assistance        Functional Mobility  Functional - Mobility Device: Rolling Walker  Activity: Other; To/from bathroom  Assist Level: Stand by assistance (Close SBA)  Functional Mobility Comments: To/from therapy gym at slow pace. No LOB noted. Required seated rest break after each trial of mobility       Type of ROM/Therapeutic Exercise: AROM (red tband)  Comment: Patient completed BUE exercises x15 reps x1 set in all joints/planes using theraband seated in chair. Required rest breaks after each exercise.  Completed exercises to increase strength and activity tolerance required for ADL routine and toilet transfers          Activity Tolerance:  Activity Tolerance: Patient limited by fatigue;Patient limited by pain    Assessment:     Performance deficits / Impairments: Decreased functional mobility , Decreased endurance, Decreased ADL status, Decreased balance, Decreased safe awareness  Prognosis: Good  Discharge Recommendations: Home with Home health OT, Continue to assess pending progress ([facility] )    Patient Education:  Patient Education: safety with transfers and mobility, HEP, safety within the kitchen using RW transferring items    Equipment Recommendations:  Equipment Needed: Yes  Other: potential need for LHAE    Safety:  Safety Devices in place: Yes  Type of devices: Call light within reach, Chair alarm in place, Gait belt, Left in chair    Plan:  Times per week: 6x  Current Treatment

## 2018-09-17 NOTE — PROGRESS NOTES
[facility]  INPATIENT PHYSICAL THERAPY  DAILY NOTE  [facility] 8E - 8E-65/065-A    Time In: 1400  Time Out: 1446  Timed Code Treatment Minutes: 55 Minutes  Minutes: 46          Date: 2018  Patient Name: Gail Mcclain,  Gender:  female        MRN: [id number]  : 1933  (80 y.o.)  Referral Date : 09/10/18  Referring Practitioner: Ordering and Attending: [facility]. MD Bradley  Diagnosis: S/P open takedown with partial colectomy  Additional Pertinent Hx: Pt admitted for COLOSTOMY REVERSAL AND PARASTOMAL HERNIA REPAIR, partial colectomy, small bowel resection by Dr. Jossue Haines.  To TCU on 9/10     Past Medical History:   Diagnosis Date    Anxiety     Blind right eye     Breast cancer metastasized to liver ([address] 05/10/2016    Liver lesion noted     Carotid stenosis      Left ICA 25%    Colostomy in place [facility]) 2016    Degenerative arthritis of cervical spine     GERD (gastroesophageal reflux disease)     Hypercholesteremia     Hypoestrogenism     Hypothyroidism     Lumbago     MDRO (multiple drug resistant organisms) resistance     pt stated cleared    Personal history of cardiac dysrhythmia     Sigmoid diverticulosis     Spondylolisthesis of lumbosacral region     Steroid-induced hyperglycemia     Subclavian artery stenosis (HCC)     left    SVT (supraventricular tachycardia) ([address]     Tem[facility] arteritis [facility]     Vertebral artery occlusion     left    Vitamin D deficiency 2017     Past Surgical History:   Procedure Laterality Date    CARDIAC CATHETERIZATION      CATARACT REMOVAL  right ; left     CHOLECYSTECTOMY      COLONOSCOPY      COLOSTOMY  2018    REVERSAL OF COLOSTOMY    DILATION AND CURETTAGE OF UTERUS      EYE SURGERY      EYE SURGERY Right 2017    Dr DEVIN GUTIERREZ [facility] in [address]      partial    OTHER SURGICAL HISTORY  2016    robotic assisted laparoscopic anterior colon resection and end colostomy    [place] OFFICE/OUTPT VISIT,PROCEDURE ONLY N/A 9/4/2018    COLOSTOMY REVERSAL AND PARASTOMAL HERNIA REPAIR performed by Patrick Justice MD at [address] [facility]  2002    UPPER GASTROINTESTINAL ENDOSCOPY  11/06       Restrictions/Precautions:  General Precautions, Fall Risk                    Other position/activity restrictions: Abdominal binder  Spinal: Lumbar Corset    Prior Level of Function:  ADL Assistance: Needs assistance  Homemaking Assistance: Needs assistance  Ambulation Assistance: Independent  Transfer Assistance: Independent  Additional Comments: Pt reports using a walker or cane to get around at home PLOF. Pt reports she has an aide 5x week that A with bathing & IADLs, present for 3 hours/day    Subjective:     Subjective: pt in bed upona rrival and agrees to therapy. pt reporting increased pain and fatigue- states she did not sleep well last night. Pain:  Yes.   Pain Assessment  Pain Assessment: 0-10  Pain Level: 8 (abdomen)       Social/Functional:  Lives With: Alone  Type of Home: House  Home Layout: One level  Home Access: Stairs to enter with rails  Entrance Stairs - Number of Steps: 2  Entrance Stairs - Rails: Right  Home Equipment: 4 wheeled walker, Rolling walker, Cane, Lift chair     Objective:  Supine to Sit: Stand by assistance (cue for logroll, used rail)  Sit to Supine: Stand by assistance (increased time for LEs into bed)  Scooting: Supervision (forward and back in seated position and to reosition in bed)    Transfers  Sit to Stand: Stand by assistance (cues for hand placement)  Stand to sit: Stand by assistance (cues for hand placement and to keep RW with her at all times)       Ambulation 1  Surface: level tile  Device: Rolling Walker  Other Apparatus: O2 (1L)  Assistance: Stand by assistance  Quality of Gait: slowed sivan and velocity, short step lengths, downward gaze, mildly unsteady at times, Rknee remains flexed  throughout cycle  Distance: 2x150'    Stairs  # Steps : 4  Stairs Height: 6\"  Rails: Bilateral (L ascendign hand rail with descent)  Device: No Device  Assistance: Contact guard assistance  Comment: reciprocal pattern, cue to slow down, grossly steady    Balance  Comments: ring toss activity x2 trials while standing at AD with single UE support pt reaching outside of/to the limit of the QUIRINO and tossing rings at target on floor. pt grossly steady CGA/SBA throughout    Exercises:  Exercises  Comments: supine BLE therex to increase strength and independence with functional mobiltiy 1x20 reps ankle pumps, quad sets, glute sets, heel slides, hip ab/add, SAQ     Activity Tolerance:  Activity Tolerance: Patient Tolerated treatment well    Assessment: Body structures, Functions, Activity limitations: Decreased functional mobility , Decreased strength, Decreased endurance, Decreased balance  Assessment: pt olerated session well. pt is limited by fatigue and pain this pm. pt demos decreased functional tolerance and strength. pt will benefit from cont PT at this time     Discharge Recommendations: Continue to assess pending progress    Patient Education:  Patient Education: logroll, gait, transfers    Equipment Recommendations:  Equipment Needed: No    Safety:  Type of devices:  All fall risk precautions in place, Call light within reach, Chair alarm in place, Gait belt, Patient at risk for falls, Left in chair    Plan:  Times per week: 6x  Current Treatment Recommendations: Strengthening, Balance Training, Endurance Training, Functional Mobility Training, Transfer Training, Gait Training, Stair training, Patient/Caregiver Education & Training, Safety Education & Training, Home Exercise Program, Equipment Evaluation, Education, & procurement    Goals:  Patient goals : go home    Short term goals  Time Frame for Short term goals: 1 week  Short term goal 1: patient to perform bed mobility at S to get in and out of bed  Short term goal 2: patient to perform

## 2018-09-18 PROCEDURE — 97535 SELF CARE MNGMENT TRAINING: CPT

## 2018-09-18 PROCEDURE — 6360000002 HC RX W HCPCS: Performed by: EMERGENCY MEDICINE

## 2018-09-18 PROCEDURE — 97530 THERAPEUTIC ACTIVITIES: CPT

## 2018-09-18 PROCEDURE — 1290000000 HC SEMI PRIVATE OTHER R&B

## 2018-09-18 PROCEDURE — 97110 THERAPEUTIC EXERCISES: CPT

## 2018-09-18 PROCEDURE — 6370000000 HC RX 637 (ALT 250 FOR IP): Performed by: EMERGENCY MEDICINE

## 2018-09-18 RX ADMIN — TIMOLOL MALEATE 1 DROP: 5 SOLUTION OPHTHALMIC at 21:02

## 2018-09-18 RX ADMIN — DEXTRAN 70 AND HYPROMELLOSE 2910 1 DROP: 1; 3 SOLUTION/ DROPS OPHTHALMIC at 21:02

## 2018-09-18 RX ADMIN — SERTRALINE 50 MG: 50 TABLET, FILM COATED ORAL at 21:01

## 2018-09-18 RX ADMIN — HYDROCODONE BITARTRATE AND ACETAMINOPHEN 1 TABLET: 5; 325 TABLET ORAL at 21:01

## 2018-09-18 RX ADMIN — DEXTRAN 70 AND HYPROMELLOSE 2910 1 DROP: 1; 3 SOLUTION/ DROPS OPHTHALMIC at 17:35

## 2018-09-18 RX ADMIN — PANTOPRAZOLE SODIUM 40 MG: 40 TABLET, DELAYED RELEASE ORAL at 05:07

## 2018-09-18 RX ADMIN — DEXTRAN 70 AND HYPROMELLOSE 2910 1 DROP: 1; 3 SOLUTION/ DROPS OPHTHALMIC at 14:32

## 2018-09-18 RX ADMIN — DEXTRAN 70 AND HYPROMELLOSE 2910 1 DROP: 1; 3 SOLUTION/ DROPS OPHTHALMIC at 09:20

## 2018-09-18 RX ADMIN — CALCIUM CARBONATE-VITAMIN D TAB 500 MG-200 UNIT 1 TABLET: 500-200 TAB at 09:19

## 2018-09-18 RX ADMIN — TIMOLOL MALEATE 1 DROP: 5 SOLUTION OPHTHALMIC at 09:20

## 2018-09-18 RX ADMIN — METOPROLOL TARTRATE 12.5 MG: 25 TABLET ORAL at 21:01

## 2018-09-18 RX ADMIN — SERTRALINE 50 MG: 50 TABLET, FILM COATED ORAL at 09:19

## 2018-09-18 RX ADMIN — LEVOTHYROXINE SODIUM 88 MCG: 88 TABLET ORAL at 05:07

## 2018-09-18 RX ADMIN — PREDNISONE 2 MG: 1 TABLET ORAL at 09:20

## 2018-09-18 RX ADMIN — ENOXAPARIN SODIUM 40 MG: 40 INJECTION SUBCUTANEOUS at 09:20

## 2018-09-18 RX ADMIN — ISOSORBIDE MONONITRATE 30 MG: 30 TABLET ORAL at 09:19

## 2018-09-18 RX ADMIN — MULTIPLE VITAMINS W/ MINERALS TAB 1 TABLET: TAB at 09:19

## 2018-09-18 RX ADMIN — HYDROCODONE BITARTRATE AND ACETAMINOPHEN 2 TABLET: 5; 325 TABLET ORAL at 05:09

## 2018-09-18 RX ADMIN — METOPROLOL TARTRATE 12.5 MG: 25 TABLET ORAL at 09:19

## 2018-09-18 RX ADMIN — HYDROCODONE BITARTRATE AND ACETAMINOPHEN 2 TABLET: 5; 325 TABLET ORAL at 09:23

## 2018-09-18 ASSESSMENT — PAIN DESCRIPTION - ORIENTATION
ORIENTATION: LOWER;MID
ORIENTATION: LOWER;MID

## 2018-09-18 ASSESSMENT — PAIN DESCRIPTION - LOCATION
LOCATION: ABDOMEN
LOCATION: ABDOMEN

## 2018-09-18 ASSESSMENT — PAIN DESCRIPTION - PROGRESSION
CLINICAL_PROGRESSION: GRADUALLY IMPROVING
CLINICAL_PROGRESSION: GRADUALLY IMPROVING

## 2018-09-18 ASSESSMENT — PAIN SCALES - GENERAL
PAINLEVEL_OUTOF10: 3
PAINLEVEL_OUTOF10: 3
PAINLEVEL_OUTOF10: 8
PAINLEVEL_OUTOF10: 7
PAINLEVEL_OUTOF10: 8
PAINLEVEL_OUTOF10: 6
PAINLEVEL_OUTOF10: 5

## 2018-09-18 ASSESSMENT — PAIN DESCRIPTION - FREQUENCY: FREQUENCY: INTERMITTENT

## 2018-09-18 ASSESSMENT — PAIN DESCRIPTION - DESCRIPTORS: DESCRIPTORS: ACHING;DISCOMFORT;PENETRATING

## 2018-09-18 ASSESSMENT — PAIN DESCRIPTION - PAIN TYPE
TYPE: SURGICAL PAIN
TYPE: SURGICAL PAIN

## 2018-09-18 NOTE — PROGRESS NOTES
Family Medicine Progress Notes/Coverage    Today's Date: 9/18/18  8E-65/065-A  Medical Record # [id number]  Account # [de-identified]      Ms. Rod admitted on 9/10/2018        Subjective / Interval History :     Doing well no shortness of breath or chest pain   there is minimal drainage on the right abdomen  Reviewed notes, consults, laboratory and radiology results,    Objective:       Physical Exam:  Patient Vitals for the past 24 hrs:   BP Temp Temp src Pulse Resp SpO2 Weight   09/18/18 0330 - - - - - - 159 lb 9.6 oz (72.4 kg)   09/17/18 2015 (!) 146/71 98.4 °F (36.9 °C) Oral 62 12 94 % -   09/17/18 0829 (!) 141/61 98.1 °F (36.7 °C) Oral 64 18 93 % -       General Appearance:  Alert, cooperative, no distress, appears stated age   HEENT:  Neck:      Chest/Lungs:  Heart: Clear to auscultation  Regular rate and rhythm   Abdomen:  Back: Soft depressible there is a healing incisional wound with staples without any sign    Extremities:  Neurological Exam: No edema  Within normal limits       Assessment:     Admitting Diagnosis:    S/P open takedown with partial colectomy    Active Hospital Problems    Diagnosis Date Noted    Weakness generalized [R53.1] 09/10/2018     Priority: High    Parastomal hernia without obstruction or gangrene [K43.5] 09/04/2018     Priority: High    Diverticular stricture ([address] [K56.699]      Priority: High    Adhesion of intestine [K66.0]      Priority: High    Personal history of cardiac dysrhythmia [Z86.79]      Priority: Medium    Current chronic use of systemic steroids [Z79.52] 09/04/2018     Priority: Medium    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]      Priority: Medium    Steroid-induced hyperglycemia [R73.9, T38.0X5A] 04/19/2016     Priority: Medium    Hypothyroidism due to acquired atrophy of thyroid [E03.4] 03/03/2016     Priority: Medium    K 4.3 09/16/2018     09/16/2018    CO2 33 09/16/2018     Lab Results   Component Value Date    CKTOTAL 41 10/16/2016     Lab Results   Component Value Date    ALT 16 07/23/2018    AST 22 07/23/2018    ALKPHOS 36 (L) 07/23/2018    BILITOT 0.4 07/23/2018     Lab Results   Component Value Date    LIPASE 33.9 06/01/2018         Electronically signed by Ricardo Thornton MD on 9/18/18 at 7:16 AM                                                                                           Fortino Estrada M.D.

## 2018-09-18 NOTE — PLAN OF CARE
Problem: Falls - Risk of:  Goal: Will remain free from falls  Will remain free from falls   Outcome: Ongoing  Call light and bedside table within reach. Hourly rounding done by staff    Problem: Risk for Impaired Skin Integrity  Goal: Tissue integrity - skin and mucous membranes  Structural intactness and normal physiological function of skin and  mucous membranes. Outcome: Ongoing  Encourage pt to rotate in bed. Waffle cushion in chair while up    Problem: Pain:  Goal: Pain level will decrease  Pain level will decrease   Outcome: Ongoing  Pt c/o pain in her abdomen. Pt takes norco to control pain    Problem: Bleeding:  Goal: Will show no signs and symptoms of excessive bleeding  Will show no signs and symptoms of excessive bleeding   Outcome: Ongoing  Pt takes lovenox.  Excessive bleeding and bruising monitored by pt and staff    Problem: Mobility - Impaired:  Goal: Mobility will improve  Mobility will improve   Outcome: Ongoing  Pt works with pt and ot
Outcome: Ongoing  Involve pt and family in dc planning process.
50 mg at 09/17/18 2033    therapeutic multivitamin-minerals 1 tablet  1 tablet Oral Daily Hedy Caballero MD   1 tablet at 09/17/18 0920    timolol (TIMOPTIC) 0.5 % ophthalmic solution 1 drop  1 drop Right Eye BID Hedy Caballero MD   1 drop at 09/17/18 2034    tuberculin injection 5 Units  5 Units Intradermal Weekly Hedy Caballero MD   5 Units at 09/11/18 1241       Plan of Care Problems and Goals (a check in the box indicates current careplan problems and goals):     [x] Problem: Pain    Goal: Pain Level will decrease   [x] Problem: Falls- Risk of    Goal: Absence of Falls   [x] Problem: Risk of Impaired Skin Integrity    Goal: Will show no infection signs and symptoms    Goal: Absence of new skin breakdown   [x] Problem: Risk of Bleeding    Goal: Will show no signs and symptoms of excessive bleeding     [x] Problem: Infection- Risk of Surgical Site Infection    Goal: Will show no infection signs and symptoms   [x] Problem: Nutrition    Goal: Optimal nutrition therapy   [x] Problem: Impaired Mobility    Goal: Mobility will improve   [x] Problem: Discharge Barriers    Goal: Patient's continuum of care needs are met   [x] Problem: Risk of Altered Mood    Goal: Mood stable   [x] Problem: Anxiety    Goal: Anxiety is at manageable level    Goal: Level of anxiety will decrease    [x] Problem: Respiratory     Goal: Clear lung sounds

## 2018-09-18 NOTE — PROGRESS NOTES
colostomy    [place] OFFICE/OUTPT VISIT,PROCEDURE ONLY N/A 9/4/2018    COLOSTOMY REVERSAL AND PARASTOMAL HERNIA REPAIR performed by Beth Cisse MD at [address]  2002    UPPER GASTROINTESTINAL ENDOSCOPY  11/06       Restrictions/Precautions:  General Precautions, Fall Risk                    Other position/activity restrictions: Abdominal binder  Spinal: Lumbar Corset    Prior Level of Function:  ADL Assistance: Needs assistance  Homemaking Assistance: Needs assistance  Ambulation Assistance: Independent  Transfer Assistance: Independent  Additional Comments: Pt reports using a walker or cane to get around at home PLOF. Pt reports she has an aide 5x week that A with bathing & IADLs, present for 3 hours/day    Subjective:     Subjective: patient in chair on arrival, agrees to therapy, voices no concerns with returning home at this time    Pain:  Denies.           Social/Functional:  Lives With: Alone  Type of Home: House  Home Layout: One level  Home Access: Stairs to enter with rails  Entrance Stairs - Number of Steps: 2  Entrance Stairs - Rails: Right  Home Equipment: 4 wheeled walker, Rolling walker, Cane, Lift chair     Objective:  Supine to Sit: Modified independent  Sit to Supine: Supervision  Scooting: Modified independent    Transfers  Sit to Stand: Supervision  Stand to sit: Supervision  Car Transfer: Supervision       Ambulation 1  Surface: level tile  Device: [address]: Supervision  Quality of Gait: slowed sivan and velocity, short step lengths, downward gaze, lacks TKE throughout cycle B  Distance: 200ft x2, 90ft    Stairs  # Steps : 4  Stairs Height: 6\"  Rails: Right ascending  Device: No Device  Assistance: Supervision  Comment: reciprocal to ascend, step to for desension, steady    Balance  Sitting - Static: Good  Sitting - Dynamic: Good  Standing - Static: Good  Standing - Dynamic: Good;-          Exercises:  Exercises  Comments: reviewed seated/supine HEP packet with patient, voiced no concerns with performance. performed seated ankle pumps, long arc quads, hip abd, marches, and glute sets x12 reps each B to increase strength and improv emobility             Activity Tolerance:  Activity Tolerance: Patient Tolerated treatment well    Assessment: Body structures, Functions, Activity limitations: Decreased functional mobility , Decreased strength, Decreased endurance, Decreased balance  Assessment: the patient has currently met all established short and long term goals. she has made good progress with mobility during her stay and has voiced no concerns with returning home at this time. she will be discharged to home this date with continued [facility] services to ensure safety within the home     Discharge Recommendations: Home with assist PRN, Home with Home health PT    Patient Education:  Patient Education: logroll, gait, transfers    Equipment Recommendations:  Equipment Needed: No    Safety:  Type of devices:  All fall risk precautions in place, Call light within reach, Chair alarm in place, Gait belt, Patient at risk for falls, Left in chair    Plan:  Times per week: discharge home 9/18 with [facility] services  Current Treatment Recommendations: Strengthening, Balance Training, Endurance Training, Functional Mobility Training, Transfer Training, Gait Training, Stair training, Patient/Caregiver Education & Training, Safety Education & Training, Home Exercise Program, Equipment Evaluation, Education, & procurement    Goals:  Patient goals : go home    Short term goals  Time Frame for Short term goals: 1 week  Short term goal 1: patient to perform bed mobility at S to get in and out of bed - MET  Short term goal 2: patient to perform transfers to and from various surfaces at SBA to rise from bed or chair - MET  Short term goal 3: patient to ambulate 125ft with RW at [place] [address] for household mobility - MET  Short term goal 4: patient to negotiate 2 steps with single rail at [place] [address] for

## 2018-09-18 NOTE — PROGRESS NOTES
MRN: [id number]    : 1933  (80 y.o.)  Gender: female   Principal Problem: Weakness generalized    Section J    Health Conditions    Should Pain Assessment Interview be Conducted? Attempt to conduct interview with all residents. If resident is comatose, skip to , Shortness of Breath (dyspnea)   Enter Code  1 0 - No à (resident is rarely/never understood) à Skip to [address] of Breath  1 - Yes à Continue to , Pain Presence   Pain Assessment Interview   Pain Presence   Enter Code  1 Ask resident: [place] you had pain or hurting at any time in the last 5 days?   0 - No à Skip to , Shortness of Breath  1 - Yes  à Continue to , Pain Frequency  9  Unable to answer à Skip to , Shortness of Breath    Pain Frequency   Enter Code          3 Ask resident: Travis Rojas much of the time have you experienced pain or hurting over the last 5 days?   1  Almost constantly  2  Frequently  3  Occasionally  4  Rarely  9  Unable to answer    Pain Effect on Function   Enter Code      0 Ask resident: Kitty Bailey the last 5 days, has pain made it hard for you to sleep at night?   0 - No   1 - Yes   9  Unable to answer    Enter Code      0 Ask resident: Kitty Bournemarilin the last 5 days, have you limited your day-to-day activities because of pain?   0 - No   1 - Yes   9  Unable to answer     Pain Intensity   Enter Code      08 A. Numeric Rating Scale (00-10)  Ask resident: Rachel Nielsen rate your worst pain over the last 5 days on a zero to ten scale, with zero being no pain and ten as the worst pain you can imagine.  (Show resident 00-10 pain scale)  Enter two-digit response. Enter 99 if unable to answer.

## 2018-09-18 NOTE — PROGRESS NOTES
[facility]  INPATIENT OCCUPATIONAL THERAPY  [facility] 8[facility]  DISCHARGE NOTE    Time:  Time In: 08  Time Out: 08  Timed Code Treatment Minutes: 36 Minutes  Minutes: 40    Date: 2018  Patient Name: Gail Mcclain,   Gender: female      MRN: [id number]  : 1933  (80 y.o.)  Referring Practitioner: Federica Colin MD  Diagnosis: s/p open takedown with partial colectomy  Additional Pertinent Hx: s/p COLOSTOMY REVERSAL AND PARASTOMAL HERNIA REPAIR, partial colectomy, small bowel resection on 18.  Pt admitted to TCU 9/10    Restrictions/Precautions:  Restrictions/Precautions: General Precautions, Fall Risk    Position Activity Restriction  Other position/activity restrictions: Abdominal binder    Required Braces or Orthoses  Spinal: Lumbar Corset    Past Medical History:   Diagnosis Date    Anxiety     Blind right eye     Breast cancer metastasized to liver ([address] 05/10/2016    Liver lesion noted     Carotid stenosis      Left ICA 25%    [facility]) 2016    Degenerative arthritis of cervical spine     GERD (gastroesophageal reflux disease)     Hypercholesteremia     Hypoestrogenism     Hypothyroidism     Lumbago     MDRO (multiple drug resistant organisms) resistance     pt stated cleared    Personal history of cardiac dysrhythmia     Sigmoid diverticulosis     Spondylolisthesis of lumbosacral region     Steroid-induced hyperglycemia     Subclavian artery stenosis (HCC)     left    SVT (supraventricular tachycardia) ([address]     Temporal arteritis ([address]     Vertebral artery occlusion     left    Vitamin D deficiency 2017     Past Surgical History:   Procedure Laterality Date    CARDIAC CATHETERIZATION      CATARACT REMOVAL  right ; left     CHOLECYSTECTOMY      COLONOSCOPY      COLOSTOMY  2018    REVERSAL OF COLOSTOMY    DILATION AND CURETTAGE OF UTERUS      EYE SURGERY      EYE SURGERY Right

## 2018-09-19 ENCOUNTER — TELEPHONE (OUTPATIENT)
Dept: FAMILY MEDICINE CLINIC | Age: 83
End: 2018-09-19

## 2018-09-19 VITALS
HEART RATE: 77 BPM | DIASTOLIC BLOOD PRESSURE: 111 MMHG | TEMPERATURE: 98.9 F | SYSTOLIC BLOOD PRESSURE: 156 MMHG | RESPIRATION RATE: 18 BRPM | WEIGHT: 159.6 LBS | HEIGHT: 63 IN | OXYGEN SATURATION: 91 % | BODY MASS INDEX: 28.28 KG/M2

## 2018-09-19 DIAGNOSIS — K43.5 PARASTOMAL HERNIA WITHOUT OBSTRUCTION OR GANGRENE: Primary | ICD-10-CM

## 2018-09-19 DIAGNOSIS — Z98.890 HISTORY OF ABDOMINAL SURGERY: ICD-10-CM

## 2018-09-19 PROCEDURE — 6360000002 HC RX W HCPCS: Performed by: EMERGENCY MEDICINE

## 2018-09-19 PROCEDURE — 2709999900 HC NON-CHARGEABLE SUPPLY

## 2018-09-19 PROCEDURE — 6370000000 HC RX 637 (ALT 250 FOR IP): Performed by: EMERGENCY MEDICINE

## 2018-09-19 RX ORDER — HYDROCODONE BITARTRATE AND ACETAMINOPHEN 5; 325 MG/1; MG/1
1 TABLET ORAL EVERY 6 HOURS PRN
Qty: 20 TABLET | Refills: 0 | Status: SHIPPED | OUTPATIENT
Start: 2018-09-19 | End: 2018-09-24

## 2018-09-19 RX ADMIN — MULTIPLE VITAMINS W/ MINERALS TAB 1 TABLET: TAB at 08:52

## 2018-09-19 RX ADMIN — DEXTRAN 70 AND HYPROMELLOSE 2910 1 DROP: 1; 3 SOLUTION/ DROPS OPHTHALMIC at 08:53

## 2018-09-19 RX ADMIN — PANTOPRAZOLE SODIUM 40 MG: 40 TABLET, DELAYED RELEASE ORAL at 05:51

## 2018-09-19 RX ADMIN — TIMOLOL MALEATE 1 DROP: 5 SOLUTION OPHTHALMIC at 08:52

## 2018-09-19 RX ADMIN — CALCIUM CARBONATE-VITAMIN D TAB 500 MG-200 UNIT 1 TABLET: 500-200 TAB at 08:51

## 2018-09-19 RX ADMIN — LEVOTHYROXINE SODIUM 88 MCG: 88 TABLET ORAL at 05:51

## 2018-09-19 RX ADMIN — HYDROCODONE BITARTRATE AND ACETAMINOPHEN 2 TABLET: 5; 325 TABLET ORAL at 09:49

## 2018-09-19 RX ADMIN — ISOSORBIDE MONONITRATE 30 MG: 30 TABLET ORAL at 08:51

## 2018-09-19 RX ADMIN — SERTRALINE 50 MG: 50 TABLET, FILM COATED ORAL at 08:52

## 2018-09-19 RX ADMIN — PREDNISONE 2 MG: 1 TABLET ORAL at 08:52

## 2018-09-19 RX ADMIN — METOPROLOL TARTRATE 12.5 MG: 25 TABLET ORAL at 08:51

## 2018-09-19 RX ADMIN — HYDROCODONE BITARTRATE AND ACETAMINOPHEN 1 TABLET: 5; 325 TABLET ORAL at 05:50

## 2018-09-19 RX ADMIN — ENOXAPARIN SODIUM 40 MG: 40 INJECTION SUBCUTANEOUS at 08:52

## 2018-09-19 ASSESSMENT — PAIN DESCRIPTION - PAIN TYPE: TYPE: ACUTE PAIN;SURGICAL PAIN

## 2018-09-19 ASSESSMENT — PAIN SCALES - GENERAL
PAINLEVEL_OUTOF10: 3
PAINLEVEL_OUTOF10: 5
PAINLEVEL_OUTOF10: 8
PAINLEVEL_OUTOF10: 0

## 2018-09-19 ASSESSMENT — PAIN DESCRIPTION - PROGRESSION
CLINICAL_PROGRESSION: GRADUALLY IMPROVING
CLINICAL_PROGRESSION: GRADUALLY IMPROVING

## 2018-09-19 ASSESSMENT — PAIN DESCRIPTION - ORIENTATION: ORIENTATION: MID

## 2018-09-19 ASSESSMENT — PAIN DESCRIPTION - FREQUENCY: FREQUENCY: INTERMITTENT

## 2018-09-19 ASSESSMENT — PAIN DESCRIPTION - LOCATION: LOCATION: ABDOMEN

## 2018-09-19 NOTE — TELEPHONE ENCOUNTER
Daughter called requesting pain meds for patient since she was discharged.  Please send to [facility] on [address]

## 2018-09-20 ENCOUNTER — TELEPHONE (OUTPATIENT)
Dept: FAMILY MEDICINE CLINIC | Age: 83
End: 2018-09-20

## 2018-09-24 ENCOUNTER — OFFICE VISIT (OUTPATIENT)
Dept: SURGERY | Age: 83
End: 2018-09-24

## 2018-09-24 VITALS
DIASTOLIC BLOOD PRESSURE: 68 MMHG | WEIGHT: 148 LBS | RESPIRATION RATE: 18 BRPM | SYSTOLIC BLOOD PRESSURE: 120 MMHG | TEMPERATURE: 98.2 F | BODY MASS INDEX: 27.23 KG/M2 | HEART RATE: 78 BPM | HEIGHT: 62 IN | OXYGEN SATURATION: 96 %

## 2018-09-24 DIAGNOSIS — K43.5 PARASTOMAL HERNIA WITHOUT OBSTRUCTION OR GANGRENE: Primary | ICD-10-CM

## 2018-09-24 DIAGNOSIS — Z93.3 COLOSTOMY STATUS (HCC): ICD-10-CM

## 2018-09-24 DIAGNOSIS — L03.311 CELLULITIS OF ABDOMINAL WALL: ICD-10-CM

## 2018-09-24 DIAGNOSIS — R63.0 POOR APPETITE: ICD-10-CM

## 2018-09-24 DIAGNOSIS — R53.81 PHYSICAL DECONDITIONING: ICD-10-CM

## 2018-09-24 DIAGNOSIS — Z98.890 POSTOPERATIVE STATE: ICD-10-CM

## 2018-09-24 PROCEDURE — 99024 POSTOP FOLLOW-UP VISIT: CPT | Performed by: SURGERY

## 2018-09-24 NOTE — LETTER
[address] [facility]  [address] [facility]  Phone: [phone]  Fax: [phone]    Shayne Potts MD        September 24, 2018     Patient: Yana Charles   YOB: 1933   Date of Visit: 9/24/2018       To Whom It May Concern: It is my medical opinion that Yana Charles needs a home health aide daily and physical therapy. If you have any questions or concerns, please don't hesitate to call.     Sincerely,        Shayne Potts MD

## 2018-09-25 NOTE — PROGRESS NOTES
tablets by mouth 2 times daily, Disp: 60 tablet, Rfl: 3    sertraline (ZOLOFT) 50 MG tablet, take 1 tablet by mouth twice a day, Disp: 180 tablet, Rfl: 1    Cholecalciferol (VITAMIN D3) 67275 units CAPS, Take 50,000 Units by mouth once a week On Thursdays, Disp: , Rfl:     OYSTER SHELL CALCIUM + D3 500-400 MG-UNIT TABS, take 1 tablet by mouth twice a day, Disp: 100 tablet, Rfl: 3    isosorbide mononitrate (IMDUR) 30 MG extended release tablet, take 1 tablet by mouth once daily, Disp: 45 tablet, Rfl: 3    ALPRAZolam (XANAX) 0.5 MG tablet, take 1 tablet by mouth three times a day if needed for sleep or anxiety, Disp: 45 tablet, Rfl: 0    levothyroxine (SYNTHROID) 88 MCG tablet, Take 1 tablet by mouth Daily, Disp: 90 tablet, Rfl: 1    Elastic Bandages & Supports (TRUFORM ARM SLEEVE L 15-20MMHG) MISC, 2 Units by Does not apply route daily, Disp: 2 each, Rfl: 0    clopidogrel (PLAVIX) 75 MG tablet, take 1 tablet by mouth once daily, Disp: 30 tablet, Rfl: 5    simvastatin (ZOCOR) 20 MG tablet, take 1 tablet by mouth at bedtime, Disp: 90 tablet, Rfl: 1    ondansetron (ZOFRAN ODT) 4 MG disintegrating tablet, Take 1 tablet by mouth every 8 hours as needed for Nausea, Disp: 20 tablet, Rfl: 0    pantoprazole (PROTONIX) 40 MG tablet, take 1 tablet by mouth once daily, Disp: 30 tablet, Rfl: 5    RA LORATADINE 10 MG tablet, take 1 tablet by mouth once daily, Disp: 90 tablet, Rfl: 1    glucose blood VI test strips (ONETOUCH VERIO) strip, Use to check blood sugar once daily.  E11.9, Disp: 100 each, Rfl: 3    aspirin (RA ASPIRIN ADULT LOW STRENGTH) 81 MG chewable tablet, Take 1 tablet by mouth daily, Disp: 100 tablet, Rfl: 3    ONE TOUCH LANCETS MISC, 1 each by Does not apply route daily, Disp: 100 each, Rfl: 1    albuterol (PROVENTIL) (2.5 MG/3ML) 0.083% nebulizer solution, Take 3 mLs by nebulization every 6 hours as needed for Wheezing, Disp: 30 vial, Rfl: 1    Nebulizers (NEBULIZER COMPRESSOR) MISC, Albuterol 1

## 2018-09-27 ENCOUNTER — OFFICE VISIT (OUTPATIENT)
Dept: FAMILY MEDICINE CLINIC | Age: 83
End: 2018-09-27

## 2018-09-27 VITALS
WEIGHT: 146.8 LBS | DIASTOLIC BLOOD PRESSURE: 56 MMHG | HEART RATE: 76 BPM | RESPIRATION RATE: 18 BRPM | SYSTOLIC BLOOD PRESSURE: 94 MMHG | BODY MASS INDEX: 27.02 KG/M2 | HEIGHT: 62 IN

## 2018-09-27 DIAGNOSIS — R11.0 NAUSEA: ICD-10-CM

## 2018-09-27 DIAGNOSIS — K43.5 PARASTOMAL HERNIA WITHOUT OBSTRUCTION OR GANGRENE: ICD-10-CM

## 2018-09-27 DIAGNOSIS — Z09 STATUS POST ABDOMINAL SURGERY, FOLLOW-UP EXAM: Primary | ICD-10-CM

## 2018-09-27 DIAGNOSIS — K56.699 DIVERTICULAR STRICTURE (HCC): ICD-10-CM

## 2018-09-27 DIAGNOSIS — M31.6 TEMPORAL ARTERITIS (HCC): ICD-10-CM

## 2018-09-27 DIAGNOSIS — K21.9 GASTROESOPHAGEAL REFLUX DISEASE, ESOPHAGITIS PRESENCE NOT SPECIFIED: ICD-10-CM

## 2018-09-27 PROCEDURE — 99214 OFFICE O/P EST MOD 30 MIN: CPT | Performed by: EMERGENCY MEDICINE

## 2018-09-27 PROCEDURE — 1111F DSCHRG MED/CURRENT MED MERGE: CPT | Performed by: EMERGENCY MEDICINE

## 2018-09-27 PROCEDURE — 1101F PT FALLS ASSESS-DOCD LE1/YR: CPT | Performed by: EMERGENCY MEDICINE

## 2018-09-27 RX ORDER — ONDANSETRON 4 MG/1
4 TABLET, ORALLY DISINTEGRATING ORAL EVERY 8 HOURS PRN
Qty: 20 TABLET | Refills: 0 | Status: SHIPPED | OUTPATIENT
Start: 2018-09-27 | End: 2020-08-12

## 2018-09-27 ASSESSMENT — ENCOUNTER SYMPTOMS
TROUBLE SWALLOWING: 0
CHEST TIGHTNESS: 0
VOMITING: 0
SORE THROAT: 0
NAUSEA: 0
RHINORRHEA: 0
SINUS PRESSURE: 0
BACK PAIN: 0
SHORTNESS OF BREATH: 0
COUGH: 0
WHEEZING: 0
VOICE CHANGE: 0
ABDOMINAL PAIN: 0
CONSTIPATION: 0
DIARRHEA: 0

## 2018-09-27 NOTE — DISCHARGE SUMMARY
None  Activity: no heavy lifting for 6 weeks  Diet:      Code Status: Prior    Follow-up visits:   Neal Mathur, [id number]  [address]  [phone]          01 week after discharge from transitional care unit  Procedures: Open partial colectomy with takedown of colostomy and repair of ostomy site hernia    Consults:   PT OT and family medicine    Examination:  Vitals:  Vitals:    09/10/18 0937 09/10/18 1014 09/10/18 1016 09/10/18 1129   BP: (!) 150/65   (!) 112/56   Pulse: 66   65   Resp: 22   20   Temp: 98.9 °F (37.2 °C)      TempSrc: Oral      SpO2: 95% 96% 92% 91%   Weight:       Height:         Weight: Weight: 162 lb 1.6 oz (73.5 kg)     Significant Diagnostics:   Radiology: No results found. Labs: No results found for this or any previous visit (from the past 72 hour(s)).     Discharge condition: Stable  Disposition: Transitional care unit      Electronically signed by Patrick Justice MD on 9/27/2018 at 8:29 AM

## 2018-09-27 NOTE — PROGRESS NOTES
tablet by mouth once daily             REFRESH TEARS 0.5 % SOLN ophthalmic solution  Place 1 drop into both eyes 4 times daily             sertraline (ZOLOFT) 50 MG tablet  take 1 tablet by mouth twice a day             timolol (BETIMOL) 0.5 % ophthalmic solution  Place 1 drop into the right eye 2 times daily. Medications marked \"taking\" at this time  Outpatient Prescriptions Marked as Taking for the 9/27/18 encounter (Office Visit) with Carlee West MD   Medication Sig Dispense Refill    ondansetron (ZOFRAN ODT) 4 MG disintegrating tablet Take 1 tablet by mouth every 8 hours as needed for Nausea 20 tablet 0    mupirocin (BACTROBAN) 2 % ointment Apply 2 times daily. 22 g 0    Oxygen Concentrator 1 L by Nasal route continuous       metoprolol tartrate (LOPRESSOR) 25 MG tablet Take 0.5 tablets by mouth 2 times daily 60 tablet 3    sertraline (ZOLOFT) 50 MG tablet take 1 tablet by mouth twice a day 180 tablet 1    Cholecalciferol (VITAMIN D3) 94774 units CAPS Take 50,000 Units by mouth once a week On Thursdays      OYSTER SHELL CALCIUM + D3 500-400 MG-UNIT TABS take 1 tablet by mouth twice a day 100 tablet 3    isosorbide mononitrate (IMDUR) 30 MG extended release tablet take 1 tablet by mouth once daily 45 tablet 3    ALPRAZolam (XANAX) 0.5 MG tablet take 1 tablet by mouth three times a day if needed for sleep or anxiety 45 tablet 0    levothyroxine (SYNTHROID) 88 MCG tablet Take 1 tablet by mouth Daily 90 tablet 1    Elastic Bandages & Supports (TRUFORM ARM SLEEVE L 15-20MMHG) MISC 2 Units by Does not apply route daily 2 each 0    clopidogrel (PLAVIX) 75 MG tablet take 1 tablet by mouth once daily 30 tablet 5    pantoprazole (PROTONIX) 40 MG tablet take 1 tablet by mouth once daily 30 tablet 5    RA LORATADINE 10 MG tablet take 1 tablet by mouth once daily 90 tablet 1    glucose blood VI test strips (ONETOUCH VERIO) strip Use to check blood sugar once daily.  E11.9 100 each 3    aspirin (RA ASPIRIN ADULT LOW STRENGTH) 81 MG chewable tablet Take 1 tablet by mouth daily 100 tablet 3    ONE TOUCH LANCETS MISC 1 each by Does not apply route daily 100 each 1    albuterol (PROVENTIL) (2.5 MG/3ML) 0.083% nebulizer solution Take 3 mLs by nebulization every 6 hours as needed for Wheezing 30 vial 1    Nebulizers (NEBULIZER COMPRESSOR) MISC Albuterol 1 unit dose every 6 hours for wheezing and chest congestion 1 each 0    predniSONE (DELTASONE) 1 MG tablet Take 2 mg by mouth daily       acetaminophen (TYLENOL) 500 MG tablet Take 1,000 mg by mouth as needed for Pain      Multiple Vitamins-Minerals (CENTRUM SILVER ADULT 50+) TABS Take 1 tablet by mouth daily 90 tablet 1    REFRESH TEARS 0.5 % SOLN ophthalmic solution Place 1 drop into both eyes 4 times daily  1    Lift Chair MISC by Does not apply route 1 each 0    timolol (BETIMOL) 0.5 % ophthalmic solution Place 1 drop into the right eye 2 times daily. Medications patient taking as of now reconciled against medications ordered at time of hospital discharge: Yes    Chief Complaint   Patient presents with    Follow-Up from Hospital    Nausea    Fatigue       HPI     Nauseated since surgery, vomiting at time. Taking Norco    Incision drainage and now taking antibiotic Levaquin    Still using O2 1 L NC    Living alone    D/C 9/18/18    Inpatient course: Discharge summary reviewed- see chart. Interval history/Current status: Having nausea    Review of Systems   Constitutional: Negative for appetite change, chills, diaphoresis, fatigue and fever. HENT: Negative for congestion, ear pain, postnasal drip, rhinorrhea, sinus pressure, sneezing, sore throat, trouble swallowing and voice change. Respiratory: Negative for cough, chest tightness, shortness of breath and wheezing. Cardiovascular: Negative for chest pain, palpitations and leg swelling.    Gastrointestinal: Negative for abdominal pain, constipation, diarrhea, nausea and

## 2018-09-30 NOTE — DISCHARGE SUMMARY
Therapy  and Dr. Guillermina Vera. HISTORY AND PHYSICAL:  The patient is an 59-year-old female who was  transferred from acute care of [address] [place] [address] to Transitional Care  because of generalized weakness following repair of parastomal hernia,  takedown of colostomy with end-to-end anastomosis, partial resection of the  small bowel. The patient was transferred to [facility] for recovery. PHYSICAL EXAMINATION:  GENERAL:  The patient is alert, active, cooperative. VITAL SIGNS:  Stable. HEENT:  Right eye blindness. Oral mucosa is moist.  NECK:  Supple. LUNGS:  Clear to auscultation. HEART:  Regular rate and rhythm. ABDOMEN:  Flat, soft, depressible. Nontender. There is a small drainage  from the previous TREASURE drain on the right abdomen, otherwise the staples are  intact. There is no sign of infection. EXTREMITIES:  Showed no edema. Full pulses. HOSPITAL COURSE:  The patient was admitted to Transitional Care Unit  09/10/2018. The patient was attended by the Occupational and Physical  Therapy. The patient stayed at [facility] for 8 days. The patient on TCU, was  able to rehabilitate herself and generally improved. The patient has been  independent, was subsequently discharged improved and stable condition  on 09/19/2018        CYNTHIA Mcdonald M.D.    D: 09/29/2018 10:40:35       T: 09/29/2018 11:55:50     [email or web address]  Job#: [id number]     Doc#: [id number]    CC:

## 2018-10-01 ENCOUNTER — HOSPITAL ENCOUNTER (OUTPATIENT)
Age: 83
Discharge: HOME | End: 2018-10-01
Payer: MEDICARE

## 2018-10-01 ENCOUNTER — HOSPITAL ENCOUNTER (OUTPATIENT)
Dept: GENERAL RADIOLOGY | Age: 83
Discharge: HOME | End: 2018-10-01
Payer: MEDICARE

## 2018-10-01 ENCOUNTER — TELEPHONE (OUTPATIENT)
Dept: SURGERY | Age: 83
End: 2018-10-01

## 2018-10-01 ENCOUNTER — OFFICE VISIT (OUTPATIENT)
Dept: SURGERY | Age: 83
End: 2018-10-01

## 2018-10-01 VITALS
HEART RATE: 80 BPM | WEIGHT: 144.6 LBS | RESPIRATION RATE: 18 BRPM | OXYGEN SATURATION: 96 % | BODY MASS INDEX: 24.69 KG/M2 | TEMPERATURE: 98 F | DIASTOLIC BLOOD PRESSURE: 62 MMHG | SYSTOLIC BLOOD PRESSURE: 118 MMHG | HEIGHT: 64 IN

## 2018-10-01 DIAGNOSIS — R06.02 SHORTNESS OF BREATH: ICD-10-CM

## 2018-10-01 DIAGNOSIS — Z93.3 COLOSTOMY STATUS (HCC): ICD-10-CM

## 2018-10-01 DIAGNOSIS — R11.0 NAUSEA: Primary | ICD-10-CM

## 2018-10-01 DIAGNOSIS — K43.5 PARASTOMAL HERNIA WITHOUT OBSTRUCTION OR GANGRENE: ICD-10-CM

## 2018-10-01 DIAGNOSIS — R11.0 NAUSEA: ICD-10-CM

## 2018-10-01 DIAGNOSIS — Z98.890 POSTOPERATIVE STATE: ICD-10-CM

## 2018-10-01 DIAGNOSIS — R63.0 POOR APPETITE: ICD-10-CM

## 2018-10-01 DIAGNOSIS — R53.81 PHYSICAL DECONDITIONING: ICD-10-CM

## 2018-10-01 PROCEDURE — 99024 POSTOP FOLLOW-UP VISIT: CPT | Performed by: SURGERY

## 2018-10-01 PROCEDURE — 74022 RADEX COMPL AQT ABD SERIES: CPT

## 2018-10-15 ENCOUNTER — OFFICE VISIT (OUTPATIENT)
Dept: SURGERY | Age: 83
End: 2018-10-15

## 2018-10-15 VITALS
BODY MASS INDEX: 25.1 KG/M2 | SYSTOLIC BLOOD PRESSURE: 120 MMHG | HEIGHT: 64 IN | RESPIRATION RATE: 18 BRPM | OXYGEN SATURATION: 94 % | HEART RATE: 74 BPM | TEMPERATURE: 97.9 F | DIASTOLIC BLOOD PRESSURE: 66 MMHG | WEIGHT: 147 LBS

## 2018-10-15 DIAGNOSIS — K43.5 PARASTOMAL HERNIA WITHOUT OBSTRUCTION OR GANGRENE: Primary | ICD-10-CM

## 2018-10-15 DIAGNOSIS — Z93.3 COLOSTOMY STATUS (HCC): ICD-10-CM

## 2018-10-15 DIAGNOSIS — Z98.890 POSTOPERATIVE STATE: ICD-10-CM

## 2018-10-15 PROCEDURE — 99024 POSTOP FOLLOW-UP VISIT: CPT | Performed by: SURGERY

## 2018-10-15 NOTE — PROGRESS NOTES
mouth once daily, Disp: 45 tablet, Rfl: 3    levothyroxine (SYNTHROID) 88 MCG tablet, Take 1 tablet by mouth Daily, Disp: 90 tablet, Rfl: 1    Elastic Bandages & Supports (TRUFORM ARM SLEEVE L 15-20MMHG) MISC, 2 Units by Does not apply route daily, Disp: 2 each, Rfl: 0    clopidogrel (PLAVIX) 75 MG tablet, take 1 tablet by mouth once daily, Disp: 30 tablet, Rfl: 5    pantoprazole (PROTONIX) 40 MG tablet, take 1 tablet by mouth once daily, Disp: 30 tablet, Rfl: 5    RA LORATADINE 10 MG tablet, take 1 tablet by mouth once daily, Disp: 90 tablet, Rfl: 1    glucose blood VI test strips (ONETOUCH VERIO) strip, Use to check blood sugar once daily. E11.9, Disp: 100 each, Rfl: 3    aspirin (RA ASPIRIN ADULT LOW STRENGTH) 81 MG chewable tablet, Take 1 tablet by mouth daily, Disp: 100 tablet, Rfl: 3    ONE TOUCH LANCETS MISC, 1 each by Does not apply route daily, Disp: 100 each, Rfl: 1    albuterol (PROVENTIL) (2.5 MG/3ML) 0.083% nebulizer solution, Take 3 mLs by nebulization every 6 hours as needed for Wheezing, Disp: 30 vial, Rfl: 1    Nebulizers (NEBULIZER COMPRESSOR) MISC, Albuterol 1 unit dose every 6 hours for wheezing and chest congestion, Disp: 1 each, Rfl: 0    predniSONE (DELTASONE) 1 MG tablet, Take 2 mg by mouth daily , Disp: , Rfl:     acetaminophen (TYLENOL) 500 MG tablet, Take 1,000 mg by mouth as needed for Pain, Disp: , Rfl:     Multiple Vitamins-Minerals (CENTRUM SILVER ADULT 50+) TABS, Take 1 tablet by mouth daily, Disp: 90 tablet, Rfl: 1    REFRESH TEARS 0.5 % SOLN ophthalmic solution, Place 1 drop into both eyes 4 times daily, Disp: , Rfl: 1    Lift Chair MISC, by Does not apply route, Disp: 1 each, Rfl: 0    timolol (BETIMOL) 0.5 % ophthalmic solution, Place 1 drop into the right eye 2 times daily. , Disp: , Rfl:     Allergies  Allergies   Allergen Reactions    Bactrim [Sulfamethoxazole-Trimethoprim] Swelling     Of tongue    Demerol Rash     nausea    Pcn [Penicillins] Rash

## 2018-11-05 ENCOUNTER — TELEPHONE (OUTPATIENT)
Dept: FAMILY MEDICINE CLINIC | Age: 83
End: 2018-11-05

## 2018-11-05 ENCOUNTER — NURSE TRIAGE (OUTPATIENT)
Dept: ADMINISTRATIVE | Age: 83
End: 2018-11-05

## 2018-11-05 RX ORDER — CEPHALEXIN 500 MG/1
500 CAPSULE ORAL 3 TIMES DAILY
Qty: 30 CAPSULE | Refills: 0 | Status: SHIPPED | OUTPATIENT
Start: 2018-11-05 | End: 2018-11-15

## 2018-11-12 ENCOUNTER — TELEPHONE (OUTPATIENT)
Dept: FAMILY MEDICINE CLINIC | Age: 83
End: 2018-11-12

## 2018-11-12 DIAGNOSIS — N39.0 URINARY TRACT INFECTION WITHOUT HEMATURIA, SITE UNSPECIFIED: Primary | ICD-10-CM

## 2018-11-12 DIAGNOSIS — R35.0 URINARY FREQUENCY: ICD-10-CM

## 2018-11-12 RX ORDER — CIPROFLOXACIN 500 MG/1
500 TABLET, FILM COATED ORAL 2 TIMES DAILY
Qty: 14 TABLET | Refills: 0 | Status: SHIPPED | OUTPATIENT
Start: 2018-11-12 | End: 2018-11-16 | Stop reason: CLARIF

## 2018-11-12 NOTE — TELEPHONE ENCOUNTER
Dakota Gamboa with [facility] called stating that Pt's BP was 92/48, pulse was 70, and tempeture was 96.8 this morning. She held pt's blood pressure medication. She also stated that pt is complaining of burning with urination and odor and vaqsuez in color. She is asking if something could be called in for pt.      [facility] on [address] may be reached at [phone]

## 2018-11-12 NOTE — TELEPHONE ENCOUNTER
Avis willett.  Faxed U/A order to [phone], [facility] [address]

## 2018-11-14 LAB
APPEARANCE: ABNORMAL
BACTERIA: ABNORMAL PER HPF
BILIRUBIN: NEGATIVE
COLOR: YELLOW
EPITHELIAL CELLS: ABNORMAL PER HPF
GLUCOSE BLD-MCNC: NEGATIVE MG/DL
KETONES, URINE: NEGATIVE
LEUKOCYTE ESTERASE, URINE: ABNORMAL
NITRITE, URINE: NEGATIVE
OCCULT BLOOD,URINE: ABNORMAL
PH: 5 (ref 5–9)
PROTEIN, URINE: NEGATIVE
RBC: ABNORMAL PER HPF (ref 0–5)
SP GRAVITY MISCELLANEOUS: 1.02 (ref 1–1.03)
UROBILINOGEN, URINE: NORMAL
WBC: ABNORMAL PER HPF (ref 0–5)

## 2018-11-15 ENCOUNTER — OFFICE VISIT (OUTPATIENT)
Dept: FAMILY MEDICINE CLINIC | Age: 83
End: 2018-11-15

## 2018-11-15 VITALS
DIASTOLIC BLOOD PRESSURE: 74 MMHG | BODY MASS INDEX: 25.06 KG/M2 | RESPIRATION RATE: 16 BRPM | HEART RATE: 70 BPM | SYSTOLIC BLOOD PRESSURE: 122 MMHG | HEIGHT: 64 IN | WEIGHT: 146.8 LBS

## 2018-11-15 DIAGNOSIS — J06.9 UPPER RESPIRATORY TRACT INFECTION, UNSPECIFIED TYPE: ICD-10-CM

## 2018-11-15 DIAGNOSIS — R19.7 DIARRHEA, UNSPECIFIED TYPE: Primary | ICD-10-CM

## 2018-11-15 LAB — URINE CULTURE, ROUTINE: NORMAL

## 2018-11-15 PROCEDURE — 99213 OFFICE O/P EST LOW 20 MIN: CPT | Performed by: EMERGENCY MEDICINE

## 2018-11-15 PROCEDURE — 1101F PT FALLS ASSESS-DOCD LE1/YR: CPT | Performed by: EMERGENCY MEDICINE

## 2018-11-15 ASSESSMENT — ENCOUNTER SYMPTOMS
SINUS PRESSURE: 0
DIARRHEA: 1
SORE THROAT: 1
NAUSEA: 0
VOICE CHANGE: 0
CONSTIPATION: 0
VOMITING: 0
COUGH: 1
SHORTNESS OF BREATH: 0
WHEEZING: 0
TROUBLE SWALLOWING: 0
BACK PAIN: 0
ABDOMINAL PAIN: 0
RHINORRHEA: 0
CHEST TIGHTNESS: 0

## 2018-11-16 ENCOUNTER — HOSPITAL ENCOUNTER (OUTPATIENT)
Age: 83
Setting detail: SPECIMEN
Discharge: HOME | End: 2018-11-16
Payer: MEDICARE

## 2018-11-16 DIAGNOSIS — R19.7 DIARRHEA, UNSPECIFIED TYPE: ICD-10-CM

## 2018-11-16 LAB
ADENOVIRUS F 40 41 PCR: NOT DETECTED
ASTROVIRUS PCR: NOT DETECTED
CAMPYLOBACTER PCR: NOT DETECTED
CLOSTRIDIUM DIFFICILE, PCR: DETECTED
CRYPTOSPORIDIUM PCR: NOT DETECTED
CYCLOSPORA CAYETANENSIS PCR: NOT DETECTED
E COLI 0157 PCR: ABNORMAL
E COLI ENTEROAGGREGATIVE PCR: NOT DETECTED
E COLI ENTEROPATHOGENIC PCR: NOT DETECTED
E COLI ENTEROTOXIGENIC PCR: NOT DETECTED
E COLI SHIGA LIKE TOXIN PCR: NOT DETECTED
E COLI SHIGELLA/ENTEROINVASIVE PCR: NOT DETECTED
E HISTOLYTICA GI FILM ARRAY: NOT DETECTED
GIARDIA LAMBLIA PCR: NOT DETECTED
NOROVIRUS GI GII PCR: NOT DETECTED
PLESIOMONAS SHIGELLOIDES PCR: NOT DETECTED
ROTAVIRUS A PCR: NOT DETECTED
SALMONELLA PCR: NOT DETECTED
SAPOVIRUS PCR: NOT DETECTED
VIBRIO CHOLERAE PCR: NOT DETECTED
VIBRIO PCR: NOT DETECTED
YERSINIA ENTEROCOLITICA PCR: NOT DETECTED

## 2018-11-16 PROCEDURE — 87507 IADNA-DNA/RNA PROBE TQ 12-25: CPT

## 2018-11-16 RX ORDER — METRONIDAZOLE 500 MG/1
500 TABLET ORAL 3 TIMES DAILY
Qty: 42 TABLET | Refills: 0 | Status: SHIPPED | OUTPATIENT
Start: 2018-11-16 | End: 2018-11-30

## 2018-11-30 ENCOUNTER — TELEPHONE (OUTPATIENT)
Dept: FAMILY MEDICINE CLINIC | Age: 83
End: 2018-11-30

## 2018-11-30 DIAGNOSIS — A04.72 C. DIFFICILE COLITIS: Primary | ICD-10-CM

## 2018-12-06 ENCOUNTER — TELEPHONE (OUTPATIENT)
Dept: FAMILY MEDICINE CLINIC | Age: 83
End: 2018-12-06

## 2018-12-06 LAB — C DIFFICILE TOXIN, EIA: NORMAL

## 2018-12-06 NOTE — TELEPHONE ENCOUNTER
----- Message from Leonidas Young MD sent at 12/6/2018  4:58 PM EST -----  Please call needs follow-up ASAP

## 2018-12-07 RX ORDER — LORATADINE 10 MG/1
TABLET ORAL
Qty: 90 TABLET | Refills: 1 | Status: ON HOLD | OUTPATIENT
Start: 2018-12-07 | End: 2019-06-01 | Stop reason: SDUPTHER

## 2018-12-11 ENCOUNTER — OFFICE VISIT (OUTPATIENT)
Dept: FAMILY MEDICINE CLINIC | Age: 83
End: 2018-12-11

## 2018-12-11 VITALS
SYSTOLIC BLOOD PRESSURE: 116 MMHG | WEIGHT: 148.2 LBS | RESPIRATION RATE: 16 BRPM | DIASTOLIC BLOOD PRESSURE: 64 MMHG | HEART RATE: 94 BPM | BODY MASS INDEX: 25.3 KG/M2 | HEIGHT: 64 IN

## 2018-12-11 DIAGNOSIS — A04.72 C. DIFFICILE DIARRHEA: Primary | ICD-10-CM

## 2018-12-11 DIAGNOSIS — Z23 IMMUNIZATION DUE: ICD-10-CM

## 2018-12-11 PROCEDURE — 90656 IIV3 VACC NO PRSV 0.5 ML IM: CPT | Performed by: EMERGENCY MEDICINE

## 2018-12-11 PROCEDURE — G0008 ADMIN INFLUENZA VIRUS VAC: HCPCS | Performed by: EMERGENCY MEDICINE

## 2018-12-11 PROCEDURE — 1101F PT FALLS ASSESS-DOCD LE1/YR: CPT | Performed by: EMERGENCY MEDICINE

## 2018-12-11 PROCEDURE — 99213 OFFICE O/P EST LOW 20 MIN: CPT | Performed by: EMERGENCY MEDICINE

## 2018-12-11 ASSESSMENT — ENCOUNTER SYMPTOMS
SORE THROAT: 0
CONSTIPATION: 0
DIARRHEA: 0
COUGH: 0
WHEEZING: 0
SHORTNESS OF BREATH: 0
CHEST TIGHTNESS: 0
BACK PAIN: 0
SINUS PRESSURE: 0
VOICE CHANGE: 0
TROUBLE SWALLOWING: 0
NAUSEA: 0
RHINORRHEA: 0
VOMITING: 0
ABDOMINAL PAIN: 0

## 2018-12-11 NOTE — PROGRESS NOTES
Visit Date: 12/11/2018    Subjective:    Nini Almeida is a 80 y. o.female who presents today for:  Chief Complaint   Patient presents with    Hypothyroidism    Gastroesophageal Reflux    Hyperlipidemia         HPI:     HPI     Patient diarrhea improved days ago, last Flagyl finished past Friday 12/7/18, patient took the Flagyl for 2 weeks    Eating well , no abdominal pain no dysuria or frequency      CurrentHome Medications:  Current Outpatient Prescriptions   Medication Sig Dispense Refill    loratadine (RA LORATADINE) 10 MG tablet take 1 tablet by mouth once daily 90 tablet 1    ondansetron (ZOFRAN ODT) 4 MG disintegrating tablet Take 1 tablet by mouth every 8 hours as needed for Nausea 20 tablet 0    Oxygen Concentrator 1 L by Nasal route continuous       metoprolol tartrate (LOPRESSOR) 25 MG tablet Take 0.5 tablets by mouth 2 times daily 60 tablet 3    sertraline (ZOLOFT) 50 MG tablet take 1 tablet by mouth twice a day 180 tablet 1    Cholecalciferol (VITAMIN D3) 79499 units CAPS Take 50,000 Units by mouth once a week On Thursdays      OYSTER SHELL CALCIUM + D3 500-400 MG-UNIT TABS take 1 tablet by mouth twice a day 100 tablet 3    isosorbide mononitrate (IMDUR) 30 MG extended release tablet take 1 tablet by mouth once daily 45 tablet 3    levothyroxine (SYNTHROID) 88 MCG tablet Take 1 tablet by mouth Daily 90 tablet 1    Elastic Bandages & Supports (TRUFORM ARM SLEEVE L 15-20MMHG) MISC 2 Units by Does not apply route daily 2 each 0    clopidogrel (PLAVIX) 75 MG tablet take 1 tablet by mouth once daily 30 tablet 5    pantoprazole (PROTONIX) 40 MG tablet take 1 tablet by mouth once daily 30 tablet 5    glucose blood VI test strips (ONETOUCH VERIO) strip Use to check blood sugar once daily.  E11.9 100 each 3    aspirin (RA ASPIRIN ADULT LOW STRENGTH) 81 MG chewable tablet Take 1 tablet by mouth daily 100 tablet 3    ONE TOUCH LANCETS MISC 1 each by Does not apply route daily 100 each 1   

## 2018-12-11 NOTE — PROGRESS NOTES
Immunizations     Name Date Dose Route    Influenza, Parisa Cooper, 3 Years and older, IM (Fluzone 3 yrs and older or Afluria 5 yrs and older) 12/11/2018 0.5 mL Intramuscular    Site: Deltoid- Right    Lot: [id number]    [address]: [id number]

## 2018-12-16 RX ORDER — SIMVASTATIN 20 MG
TABLET ORAL
Qty: 90 TABLET | Refills: 1 | Status: SHIPPED | OUTPATIENT
Start: 2018-12-16 | End: 2019-08-01 | Stop reason: SDUPTHER

## 2018-12-16 RX ORDER — CLOPIDOGREL BISULFATE 75 MG/1
TABLET ORAL
Qty: 90 TABLET | Refills: 1 | Status: SHIPPED | OUTPATIENT
Start: 2018-12-16 | End: 2019-09-23 | Stop reason: SDUPTHER

## 2018-12-18 ENCOUNTER — TELEPHONE (OUTPATIENT)
Dept: FAMILY MEDICINE CLINIC | Age: 83
End: 2018-12-18

## 2018-12-18 NOTE — TELEPHONE ENCOUNTER
Caregiver Julian Ayala called stating that Neela Meneses has been taking the probiotics for the C Diff. She had diarrhea yesterday. She is unable to do Chemo until the C-diff has cleared up. Julian Ayala wants to know how many days she is to take the probiotic before she is re-tested.   Please call Julian Ayala

## 2018-12-22 ENCOUNTER — APPOINTMENT (OUTPATIENT)
Dept: CT IMAGING | Age: 83
DRG: 389 | End: 2018-12-22
Payer: MEDICARE

## 2018-12-22 ENCOUNTER — HOSPITAL ENCOUNTER (INPATIENT)
Age: 83
LOS: 5 days | Discharge: HOME | DRG: 389 | End: 2018-12-28
Attending: EMERGENCY MEDICINE | Admitting: INTERNAL MEDICINE
Payer: MEDICARE

## 2018-12-22 DIAGNOSIS — N17.9 AKI (ACUTE KIDNEY INJURY) (HCC): Primary | ICD-10-CM

## 2018-12-22 DIAGNOSIS — K52.9 GASTROENTERITIS: ICD-10-CM

## 2018-12-22 DIAGNOSIS — K56.600 PARTIAL SMALL BOWEL OBSTRUCTION (HCC): ICD-10-CM

## 2018-12-22 LAB
ALBUMIN SERPL-MCNC: 3.9 G/DL (ref 3.5–5.1)
ALP BLD-CCNC: 53 U/L (ref 38–126)
ALT SERPL-CCNC: 15 U/L (ref 11–66)
AMORPHOUS: ABNORMAL
ANION GAP SERPL CALCULATED.3IONS-SCNC: 16 MEQ/L (ref 8–16)
AST SERPL-CCNC: 14 U/L (ref 5–40)
BACTERIA: ABNORMAL /HPF
BASOPHILS # BLD: 0.2 %
BASOPHILS ABSOLUTE: 0 THOU/MM3 (ref 0–0.1)
BILIRUB SERPL-MCNC: 0.6 MG/DL (ref 0.3–1.2)
BILIRUBIN URINE: ABNORMAL
BLOOD, URINE: NEGATIVE
BUN BLDV-MCNC: 56 MG/DL (ref 7–22)
CALCIUM SERPL-MCNC: 9.5 MG/DL (ref 8.5–10.5)
CASTS 2: ABNORMAL /LPF
CASTS UA: ABNORMAL /LPF
CHARACTER, URINE: ABNORMAL
CHLORIDE BLD-SCNC: 100 MEQ/L (ref 98–111)
CO2: 24 MEQ/L (ref 23–33)
COLOR: ABNORMAL
CREAT SERPL-MCNC: 1.7 MG/DL (ref 0.4–1.2)
CRYSTALS, UA: ABNORMAL
EKG ATRIAL RATE: 100 BPM
EKG P AXIS: 18 DEGREES
EKG P-R INTERVAL: 142 MS
EKG Q-T INTERVAL: 346 MS
EKG QRS DURATION: 76 MS
EKG QTC CALCULATION (BAZETT): 446 MS
EKG R AXIS: -11 DEGREES
EKG T AXIS: 129 DEGREES
EKG VENTRICULAR RATE: 100 BPM
EOSINOPHIL # BLD: 0 %
EOSINOPHILS ABSOLUTE: 0 THOU/MM3 (ref 0–0.4)
EPITHELIAL CELLS, UA: ABNORMAL /HPF
ERYTHROCYTE [DISTWIDTH] IN BLOOD BY AUTOMATED COUNT: 20.2 % (ref 11.5–14.5)
ERYTHROCYTE [DISTWIDTH] IN BLOOD BY AUTOMATED COUNT: 57.7 FL (ref 35–45)
GFR SERPL CREATININE-BSD FRML MDRD: 29 ML/MIN/1.73M2
GLUCOSE BLD-MCNC: 133 MG/DL (ref 70–108)
GLUCOSE URINE: NEGATIVE MG/DL
HCT VFR BLD CALC: 42.1 % (ref 37–47)
HEMOGLOBIN: 12.5 GM/DL (ref 12–16)
ICTOTEST: NEGATIVE
IMMATURE GRANS (ABS): 0.06 THOU/MM3 (ref 0–0.07)
IMMATURE GRANULOCYTES: 0.3 %
KETONES, URINE: ABNORMAL
LACTIC ACID: 1.7 MMOL/L (ref 0.5–2.2)
LEUKOCYTE ESTERASE, URINE: ABNORMAL
LIPASE: 11.4 U/L (ref 5.6–51.3)
LYMPHOCYTES # BLD: 13.4 %
LYMPHOCYTES ABSOLUTE: 2.5 THOU/MM3 (ref 1–4.8)
MCH RBC QN AUTO: 24.1 PG (ref 26–33)
MCHC RBC AUTO-ENTMCNC: 29.7 GM/DL (ref 32.2–35.5)
MCV RBC AUTO: 81.1 FL (ref 81–99)
MISCELLANEOUS 2: ABNORMAL
MONOCYTES # BLD: 6.2 %
MONOCYTES ABSOLUTE: 1.2 THOU/MM3 (ref 0.4–1.3)
NITRITE, URINE: NEGATIVE
NUCLEATED RED BLOOD CELLS: 0 /100 WBC
OSMOLALITY CALCULATION: 296.8 MOSMOL/KG (ref 275–300)
PH UA: 5
PLATELET # BLD: 311 THOU/MM3 (ref 130–400)
PMV BLD AUTO: 11.7 FL (ref 9.4–12.4)
POTASSIUM SERPL-SCNC: 4.6 MEQ/L (ref 3.5–5.2)
PROTEIN UA: 30
RBC # BLD: 5.19 MILL/MM3 (ref 4.2–5.4)
RBC URINE: ABNORMAL /HPF
RENAL EPITHELIAL, UA: ABNORMAL
SEG NEUTROPHILS: 79.9 %
SEGMENTED NEUTROPHILS ABSOLUTE COUNT: 15.2 THOU/MM3 (ref 1.8–7.7)
SODIUM BLD-SCNC: 140 MEQ/L (ref 135–145)
SPECIFIC GRAVITY, URINE: 1.02 (ref 1–1.03)
TOTAL PROTEIN: 6.7 G/DL (ref 6.1–8)
TROPONIN T: < 0.01 NG/ML
UROBILINOGEN, URINE: 0.2 EU/DL
WBC # BLD: 19 THOU/MM3 (ref 4.8–10.8)
WBC UA: ABNORMAL /HPF
YEAST: ABNORMAL

## 2018-12-22 PROCEDURE — 80053 COMPREHEN METABOLIC PANEL: CPT

## 2018-12-22 PROCEDURE — 83605 ASSAY OF LACTIC ACID: CPT

## 2018-12-22 PROCEDURE — 81001 URINALYSIS AUTO W/SCOPE: CPT

## 2018-12-22 PROCEDURE — 2580000003 HC RX 258: Performed by: EMERGENCY MEDICINE

## 2018-12-22 PROCEDURE — 74176 CT ABD & PELVIS W/O CONTRAST: CPT

## 2018-12-22 PROCEDURE — 85025 COMPLETE CBC W/AUTO DIFF WBC: CPT

## 2018-12-22 PROCEDURE — 84484 ASSAY OF TROPONIN QUANT: CPT

## 2018-12-22 PROCEDURE — 36415 COLL VENOUS BLD VENIPUNCTURE: CPT

## 2018-12-22 PROCEDURE — 99285 EMERGENCY DEPT VISIT HI MDM: CPT

## 2018-12-22 PROCEDURE — 93005 ELECTROCARDIOGRAM TRACING: CPT | Performed by: EMERGENCY MEDICINE

## 2018-12-22 PROCEDURE — 83690 ASSAY OF LIPASE: CPT

## 2018-12-22 PROCEDURE — 96375 TX/PRO/DX INJ NEW DRUG ADDON: CPT

## 2018-12-22 PROCEDURE — 6360000002 HC RX W HCPCS: Performed by: EMERGENCY MEDICINE

## 2018-12-22 RX ORDER — MORPHINE SULFATE 2 MG/ML
2 INJECTION, SOLUTION INTRAMUSCULAR; INTRAVENOUS ONCE
Status: COMPLETED | OUTPATIENT
Start: 2018-12-22 | End: 2018-12-22

## 2018-12-22 RX ORDER — 0.9 % SODIUM CHLORIDE 0.9 %
500 INTRAVENOUS SOLUTION INTRAVENOUS ONCE
Status: COMPLETED | OUTPATIENT
Start: 2018-12-22 | End: 2018-12-23

## 2018-12-22 RX ORDER — ONDANSETRON 2 MG/ML
4 INJECTION INTRAMUSCULAR; INTRAVENOUS ONCE
Status: COMPLETED | OUTPATIENT
Start: 2018-12-22 | End: 2018-12-22

## 2018-12-22 RX ADMIN — ONDANSETRON 4 MG: 2 INJECTION INTRAMUSCULAR; INTRAVENOUS at 23:14

## 2018-12-22 RX ADMIN — MORPHINE SULFATE 2 MG: 2 INJECTION, SOLUTION INTRAMUSCULAR; INTRAVENOUS at 23:14

## 2018-12-22 RX ADMIN — SODIUM CHLORIDE 500 ML: 9 INJECTION, SOLUTION INTRAVENOUS at 23:14

## 2018-12-22 ASSESSMENT — ENCOUNTER SYMPTOMS
WHEEZING: 0
EYE DISCHARGE: 0
SHORTNESS OF BREATH: 0
COUGH: 0
RHINORRHEA: 0
DIARRHEA: 1
SORE THROAT: 0
NAUSEA: 1
BACK PAIN: 0
EYE PAIN: 0
ABDOMINAL PAIN: 1
VOMITING: 1

## 2018-12-22 ASSESSMENT — PAIN DESCRIPTION - LOCATION: LOCATION: ABDOMEN

## 2018-12-22 ASSESSMENT — PAIN SCALES - GENERAL
PAINLEVEL_OUTOF10: 8
PAINLEVEL_OUTOF10: 8
PAINLEVEL_OUTOF10: 6

## 2018-12-22 ASSESSMENT — PAIN DESCRIPTION - FREQUENCY: FREQUENCY: CONTINUOUS

## 2018-12-22 ASSESSMENT — PAIN DESCRIPTION - ORIENTATION: ORIENTATION: MID

## 2018-12-22 ASSESSMENT — PAIN DESCRIPTION - DESCRIPTORS: DESCRIPTORS: SHOOTING

## 2018-12-22 ASSESSMENT — PAIN DESCRIPTION - PAIN TYPE: TYPE: ACUTE PAIN

## 2018-12-23 ENCOUNTER — APPOINTMENT (OUTPATIENT)
Dept: GENERAL RADIOLOGY | Age: 83
DRG: 389 | End: 2018-12-23
Payer: MEDICARE

## 2018-12-23 PROBLEM — K56.600 PARTIAL SMALL BOWEL OBSTRUCTION (HCC): Status: ACTIVE | Noted: 2018-12-23

## 2018-12-23 PROCEDURE — 6360000002 HC RX W HCPCS: Performed by: INTERNAL MEDICINE

## 2018-12-23 PROCEDURE — 6360000002 HC RX W HCPCS: Performed by: EMERGENCY MEDICINE

## 2018-12-23 PROCEDURE — 2580000003 HC RX 258: Performed by: INTERNAL MEDICINE

## 2018-12-23 PROCEDURE — 87507 IADNA-DNA/RNA PROBE TQ 12-25: CPT

## 2018-12-23 PROCEDURE — 2700000000 HC OXYGEN THERAPY PER DAY

## 2018-12-23 PROCEDURE — 74019 RADEX ABDOMEN 2 VIEWS: CPT

## 2018-12-23 PROCEDURE — 6360000002 HC RX W HCPCS

## 2018-12-23 PROCEDURE — 2580000003 HC RX 258: Performed by: EMERGENCY MEDICINE

## 2018-12-23 PROCEDURE — 2500000003 HC RX 250 WO HCPCS: Performed by: INTERNAL MEDICINE

## 2018-12-23 PROCEDURE — 71045 X-RAY EXAM CHEST 1 VIEW: CPT

## 2018-12-23 PROCEDURE — C9113 INJ PANTOPRAZOLE SODIUM, VIA: HCPCS | Performed by: INTERNAL MEDICINE

## 2018-12-23 PROCEDURE — 2709999900 HC NON-CHARGEABLE SUPPLY

## 2018-12-23 PROCEDURE — 96365 THER/PROPH/DIAG IV INF INIT: CPT

## 2018-12-23 PROCEDURE — 99223 1ST HOSP IP/OBS HIGH 75: CPT | Performed by: SURGERY

## 2018-12-23 PROCEDURE — 2500000003 HC RX 250 WO HCPCS: Performed by: EMERGENCY MEDICINE

## 2018-12-23 PROCEDURE — 6370000000 HC RX 637 (ALT 250 FOR IP): Performed by: INTERNAL MEDICINE

## 2018-12-23 PROCEDURE — 6370000000 HC RX 637 (ALT 250 FOR IP): Performed by: SURGERY

## 2018-12-23 PROCEDURE — 1200000000 HC SEMI PRIVATE

## 2018-12-23 RX ORDER — PANTOPRAZOLE SODIUM 40 MG/10ML
40 INJECTION, POWDER, LYOPHILIZED, FOR SOLUTION INTRAVENOUS DAILY
Status: DISCONTINUED | OUTPATIENT
Start: 2018-12-23 | End: 2018-12-28 | Stop reason: HOSPADM

## 2018-12-23 RX ORDER — ONDANSETRON 2 MG/ML
4 INJECTION INTRAMUSCULAR; INTRAVENOUS ONCE
Status: COMPLETED | OUTPATIENT
Start: 2018-12-23 | End: 2018-12-23

## 2018-12-23 RX ORDER — LEVOTHYROXINE SODIUM 88 UG/1
88 TABLET ORAL DAILY
Status: DISCONTINUED | OUTPATIENT
Start: 2018-12-23 | End: 2018-12-28 | Stop reason: HOSPADM

## 2018-12-23 RX ORDER — SODIUM CHLORIDE 0.9 % (FLUSH) 0.9 %
10 SYRINGE (ML) INJECTION EVERY 12 HOURS SCHEDULED
Status: DISCONTINUED | OUTPATIENT
Start: 2018-12-23 | End: 2018-12-28 | Stop reason: HOSPADM

## 2018-12-23 RX ORDER — TIMOLOL MALEATE 5 MG/ML
1 SOLUTION/ DROPS OPHTHALMIC 2 TIMES DAILY
Status: DISCONTINUED | OUTPATIENT
Start: 2018-12-23 | End: 2018-12-28 | Stop reason: HOSPADM

## 2018-12-23 RX ORDER — ALBUTEROL SULFATE 2.5 MG/3ML
2.5 SOLUTION RESPIRATORY (INHALATION) EVERY 6 HOURS PRN
Status: DISCONTINUED | OUTPATIENT
Start: 2018-12-23 | End: 2018-12-28 | Stop reason: HOSPADM

## 2018-12-23 RX ORDER — SODIUM CHLORIDE 9 MG/ML
INJECTION, SOLUTION INTRAVENOUS CONTINUOUS
Status: DISCONTINUED | OUTPATIENT
Start: 2018-12-23 | End: 2018-12-28 | Stop reason: HOSPADM

## 2018-12-23 RX ORDER — SODIUM CHLORIDE 9 MG/ML
INJECTION, SOLUTION INTRAVENOUS ONCE
Status: COMPLETED | OUTPATIENT
Start: 2018-12-23 | End: 2018-12-23

## 2018-12-23 RX ORDER — MORPHINE SULFATE 4 MG/ML
INJECTION, SOLUTION INTRAMUSCULAR; INTRAVENOUS
Status: COMPLETED
Start: 2018-12-23 | End: 2018-12-23

## 2018-12-23 RX ORDER — SODIUM CHLORIDE 0.9 % (FLUSH) 0.9 %
10 SYRINGE (ML) INJECTION PRN
Status: DISCONTINUED | OUTPATIENT
Start: 2018-12-23 | End: 2018-12-28 | Stop reason: HOSPADM

## 2018-12-23 RX ORDER — CIPROFLOXACIN 2 MG/ML
200 INJECTION, SOLUTION INTRAVENOUS EVERY 12 HOURS
Status: DISCONTINUED | OUTPATIENT
Start: 2018-12-23 | End: 2018-12-28

## 2018-12-23 RX ORDER — ONDANSETRON 2 MG/ML
4 INJECTION INTRAMUSCULAR; INTRAVENOUS EVERY 6 HOURS PRN
Status: DISCONTINUED | OUTPATIENT
Start: 2018-12-23 | End: 2018-12-23 | Stop reason: SDUPTHER

## 2018-12-23 RX ORDER — POTASSIUM CHLORIDE 7.45 MG/ML
10 INJECTION INTRAVENOUS PRN
Status: DISCONTINUED | OUTPATIENT
Start: 2018-12-23 | End: 2018-12-28 | Stop reason: HOSPADM

## 2018-12-23 RX ORDER — ASPIRIN 81 MG/1
81 TABLET, CHEWABLE ORAL DAILY
Status: DISCONTINUED | OUTPATIENT
Start: 2018-12-23 | End: 2018-12-28 | Stop reason: HOSPADM

## 2018-12-23 RX ORDER — MORPHINE SULFATE 4 MG/ML
4 INJECTION, SOLUTION INTRAMUSCULAR; INTRAVENOUS ONCE
Status: COMPLETED | OUTPATIENT
Start: 2018-12-23 | End: 2018-12-23

## 2018-12-23 RX ORDER — ONDANSETRON 2 MG/ML
INJECTION INTRAMUSCULAR; INTRAVENOUS
Status: COMPLETED
Start: 2018-12-23 | End: 2018-12-23

## 2018-12-23 RX ORDER — MORPHINE SULFATE 2 MG/ML
2 INJECTION, SOLUTION INTRAMUSCULAR; INTRAVENOUS EVERY 4 HOURS PRN
Status: DISCONTINUED | OUTPATIENT
Start: 2018-12-23 | End: 2018-12-28 | Stop reason: HOSPADM

## 2018-12-23 RX ORDER — CIPROFLOXACIN 2 MG/ML
400 INJECTION, SOLUTION INTRAVENOUS ONCE
Status: COMPLETED | OUTPATIENT
Start: 2018-12-23 | End: 2018-12-23

## 2018-12-23 RX ORDER — ONDANSETRON 2 MG/ML
4 INJECTION INTRAMUSCULAR; INTRAVENOUS EVERY 6 HOURS PRN
Status: DISCONTINUED | OUTPATIENT
Start: 2018-12-23 | End: 2018-12-28 | Stop reason: HOSPADM

## 2018-12-23 RX ADMIN — ONDANSETRON 4 MG: 2 INJECTION INTRAMUSCULAR; INTRAVENOUS at 03:01

## 2018-12-23 RX ADMIN — SODIUM CHLORIDE, PRESERVATIVE FREE 10 ML: 5 INJECTION INTRAVENOUS at 09:37

## 2018-12-23 RX ADMIN — MORPHINE SULFATE 4 MG: 4 INJECTION, SOLUTION INTRAMUSCULAR; INTRAVENOUS at 03:01

## 2018-12-23 RX ADMIN — LEVOTHYROXINE SODIUM 88 MCG: 88 TABLET ORAL at 09:21

## 2018-12-23 RX ADMIN — MORPHINE SULFATE 2 MG: 2 INJECTION, SOLUTION INTRAMUSCULAR; INTRAVENOUS at 09:15

## 2018-12-23 RX ADMIN — ASPIRIN 81 MG 81 MG: 81 TABLET ORAL at 09:30

## 2018-12-23 RX ADMIN — CARBOXYMETHYLCELLULOSE SODIUM 1 DROP: 10 GEL OPHTHALMIC at 20:00

## 2018-12-23 RX ADMIN — MORPHINE SULFATE 2 MG: 2 INJECTION, SOLUTION INTRAMUSCULAR; INTRAVENOUS at 16:00

## 2018-12-23 RX ADMIN — METRONIDAZOLE 500 MG: 500 INJECTION, SOLUTION INTRAVENOUS at 09:18

## 2018-12-23 RX ADMIN — SODIUM CHLORIDE: 9 INJECTION, SOLUTION INTRAVENOUS at 02:23

## 2018-12-23 RX ADMIN — CIPROFLOXACIN 200 MG: 2 INJECTION, SOLUTION INTRAVENOUS at 15:51

## 2018-12-23 RX ADMIN — METRONIDAZOLE 500 MG: 500 INJECTION, SOLUTION INTRAVENOUS at 17:18

## 2018-12-23 RX ADMIN — PANTOPRAZOLE SODIUM 40 MG: 40 INJECTION, POWDER, FOR SOLUTION INTRAVENOUS at 09:17

## 2018-12-23 RX ADMIN — METRONIDAZOLE 500 MG: 500 INJECTION, SOLUTION INTRAVENOUS at 01:21

## 2018-12-23 RX ADMIN — CARBOXYMETHYLCELLULOSE SODIUM 1 DROP: 10 GEL OPHTHALMIC at 17:14

## 2018-12-23 RX ADMIN — CIPROFLOXACIN 400 MG: 2 INJECTION, SOLUTION INTRAVENOUS at 02:23

## 2018-12-23 RX ADMIN — CARBOXYMETHYLCELLULOSE SODIUM 1 DROP: 10 GEL OPHTHALMIC at 12:56

## 2018-12-23 RX ADMIN — SODIUM CHLORIDE: 9 INJECTION, SOLUTION INTRAVENOUS at 09:41

## 2018-12-23 RX ADMIN — CHLORASEPTIC 1 SPRAY: 1.5 LIQUID ORAL at 11:38

## 2018-12-23 RX ADMIN — TIMOLOL MALEATE 1 DROP: 5 SOLUTION OPHTHALMIC at 09:18

## 2018-12-23 RX ADMIN — CARBOXYMETHYLCELLULOSE SODIUM 1 DROP: 10 GEL OPHTHALMIC at 09:20

## 2018-12-23 RX ADMIN — CHLORASEPTIC 1 SPRAY: 1.5 LIQUID ORAL at 17:15

## 2018-12-23 ASSESSMENT — PAIN SCALES - GENERAL
PAINLEVEL_OUTOF10: 6
PAINLEVEL_OUTOF10: 0
PAINLEVEL_OUTOF10: 6
PAINLEVEL_OUTOF10: 5
PAINLEVEL_OUTOF10: 8

## 2018-12-23 ASSESSMENT — ENCOUNTER SYMPTOMS
WHEEZING: 0
SORE THROAT: 0
CHEST TIGHTNESS: 0
BLOOD IN STOOL: 0
COLOR CHANGE: 0
VOICE CHANGE: 0
VOMITING: 1
SHORTNESS OF BREATH: 0
DIARRHEA: 1
TROUBLE SWALLOWING: 0
NAUSEA: 1
COUGH: 0
ABDOMINAL PAIN: 1

## 2018-12-23 ASSESSMENT — PAIN DESCRIPTION - DESCRIPTORS: DESCRIPTORS: ACHING

## 2018-12-23 ASSESSMENT — PAIN DESCRIPTION - PAIN TYPE: TYPE: ACUTE PAIN

## 2018-12-23 ASSESSMENT — PAIN DESCRIPTION - LOCATION: LOCATION: ABDOMEN

## 2018-12-23 ASSESSMENT — PAIN DESCRIPTION - FREQUENCY: FREQUENCY: CONTINUOUS

## 2018-12-23 ASSESSMENT — PAIN DESCRIPTION - ORIENTATION: ORIENTATION: MID

## 2018-12-23 ASSESSMENT — PAIN DESCRIPTION - PROGRESSION: CLINICAL_PROGRESSION: NOT CHANGED

## 2018-12-23 ASSESSMENT — PAIN DESCRIPTION - ONSET: ONSET: ON-GOING

## 2018-12-24 LAB
ADENOVIRUS F 40 41 PCR: NOT DETECTED
ANION GAP SERPL CALCULATED.3IONS-SCNC: 12 MEQ/L (ref 8–16)
ASTROVIRUS PCR: NOT DETECTED
BASOPHILS # BLD: 0.1 %
BASOPHILS ABSOLUTE: 0 THOU/MM3 (ref 0–0.1)
BUN BLDV-MCNC: 36 MG/DL (ref 7–22)
CALCIUM SERPL-MCNC: 8.3 MG/DL (ref 8.5–10.5)
CAMPYLOBACTER PCR: NOT DETECTED
CHLORIDE BLD-SCNC: 104 MEQ/L (ref 98–111)
CLOSTRIDIUM DIFFICILE, PCR: DETECTED
CO2: 25 MEQ/L (ref 23–33)
CREAT SERPL-MCNC: 0.9 MG/DL (ref 0.4–1.2)
CRYPTOSPORIDIUM PCR: NOT DETECTED
CYCLOSPORA CAYETANENSIS PCR: NOT DETECTED
E COLI 0157 PCR: ABNORMAL
E COLI ENTEROAGGREGATIVE PCR: NOT DETECTED
E COLI ENTEROPATHOGENIC PCR: NOT DETECTED
E COLI ENTEROTOXIGENIC PCR: NOT DETECTED
E COLI SHIGA LIKE TOXIN PCR: NOT DETECTED
E COLI SHIGELLA/ENTEROINVASIVE PCR: NOT DETECTED
E HISTOLYTICA GI FILM ARRAY: NOT DETECTED
EOSINOPHIL # BLD: 0.4 %
EOSINOPHILS ABSOLUTE: 0 THOU/MM3 (ref 0–0.4)
ERYTHROCYTE [DISTWIDTH] IN BLOOD BY AUTOMATED COUNT: 19.5 % (ref 11.5–14.5)
ERYTHROCYTE [DISTWIDTH] IN BLOOD BY AUTOMATED COUNT: 59.6 FL (ref 35–45)
GFR SERPL CREATININE-BSD FRML MDRD: 59 ML/MIN/1.73M2
GIARDIA LAMBLIA PCR: NOT DETECTED
GLUCOSE BLD-MCNC: 126 MG/DL (ref 70–108)
HCT VFR BLD CALC: 36.1 % (ref 37–47)
HEMOGLOBIN: 10.2 GM/DL (ref 12–16)
HYPOCHROMIA: PRESENT
IMMATURE GRANS (ABS): 0.08 THOU/MM3 (ref 0–0.07)
IMMATURE GRANULOCYTES: 0.7 %
INR BLD: 1.05 (ref 0.85–1.13)
LACTIC ACID: 0.9 MMOL/L (ref 0.5–2.2)
LYMPHOCYTES # BLD: 9.5 %
LYMPHOCYTES ABSOLUTE: 1.2 THOU/MM3 (ref 1–4.8)
MCH RBC QN AUTO: 23.9 PG (ref 26–33)
MCHC RBC AUTO-ENTMCNC: 28.3 GM/DL (ref 32.2–35.5)
MCV RBC AUTO: 84.5 FL (ref 81–99)
MONOCYTES # BLD: 5.9 %
MONOCYTES ABSOLUTE: 0.7 THOU/MM3 (ref 0.4–1.3)
NOROVIRUS GI GII PCR: NOT DETECTED
NUCLEATED RED BLOOD CELLS: 0 /100 WBC
PLATELET # BLD: 204 THOU/MM3 (ref 130–400)
PLESIOMONAS SHIGELLOIDES PCR: NOT DETECTED
PMV BLD AUTO: 12.5 FL (ref 9.4–12.4)
POTASSIUM REFLEX MAGNESIUM: 4.2 MEQ/L (ref 3.5–5.2)
RBC # BLD: 4.27 MILL/MM3 (ref 4.2–5.4)
ROTAVIRUS A PCR: NOT DETECTED
SALMONELLA PCR: NOT DETECTED
SAPOVIRUS PCR: NOT DETECTED
SEG NEUTROPHILS: 83.4 %
SEGMENTED NEUTROPHILS ABSOLUTE COUNT: 10.2 THOU/MM3 (ref 1.8–7.7)
SODIUM BLD-SCNC: 141 MEQ/L (ref 135–145)
VIBRIO CHOLERAE PCR: NOT DETECTED
VIBRIO PCR: NOT DETECTED
WBC # BLD: 12.2 THOU/MM3 (ref 4.8–10.8)
YERSINIA ENTEROCOLITICA PCR: NOT DETECTED

## 2018-12-24 PROCEDURE — 2709999900 HC NON-CHARGEABLE SUPPLY

## 2018-12-24 PROCEDURE — 1200000000 HC SEMI PRIVATE

## 2018-12-24 PROCEDURE — 2700000000 HC OXYGEN THERAPY PER DAY

## 2018-12-24 PROCEDURE — 6360000002 HC RX W HCPCS: Performed by: INTERNAL MEDICINE

## 2018-12-24 PROCEDURE — 93010 ELECTROCARDIOGRAM REPORT: CPT | Performed by: INTERNAL MEDICINE

## 2018-12-24 PROCEDURE — 80048 BASIC METABOLIC PNL TOTAL CA: CPT

## 2018-12-24 PROCEDURE — 85025 COMPLETE CBC W/AUTO DIFF WBC: CPT

## 2018-12-24 PROCEDURE — 2500000003 HC RX 250 WO HCPCS: Performed by: INTERNAL MEDICINE

## 2018-12-24 PROCEDURE — 94760 N-INVAS EAR/PLS OXIMETRY 1: CPT

## 2018-12-24 PROCEDURE — C9113 INJ PANTOPRAZOLE SODIUM, VIA: HCPCS | Performed by: INTERNAL MEDICINE

## 2018-12-24 PROCEDURE — 83605 ASSAY OF LACTIC ACID: CPT

## 2018-12-24 PROCEDURE — 36415 COLL VENOUS BLD VENIPUNCTURE: CPT

## 2018-12-24 PROCEDURE — 2580000003 HC RX 258: Performed by: INTERNAL MEDICINE

## 2018-12-24 PROCEDURE — 85610 PROTHROMBIN TIME: CPT

## 2018-12-24 PROCEDURE — 6370000000 HC RX 637 (ALT 250 FOR IP): Performed by: INTERNAL MEDICINE

## 2018-12-24 PROCEDURE — 99233 SBSQ HOSP IP/OBS HIGH 50: CPT | Performed by: SURGERY

## 2018-12-24 RX ADMIN — CHLORASEPTIC 1 SPRAY: 1.5 LIQUID ORAL at 17:31

## 2018-12-24 RX ADMIN — CARBOXYMETHYLCELLULOSE SODIUM 1 DROP: 10 GEL OPHTHALMIC at 14:09

## 2018-12-24 RX ADMIN — CIPROFLOXACIN 200 MG: 2 INJECTION, SOLUTION INTRAVENOUS at 03:20

## 2018-12-24 RX ADMIN — CIPROFLOXACIN 200 MG: 2 INJECTION, SOLUTION INTRAVENOUS at 15:30

## 2018-12-24 RX ADMIN — SODIUM CHLORIDE: 9 INJECTION, SOLUTION INTRAVENOUS at 17:14

## 2018-12-24 RX ADMIN — CARBOXYMETHYLCELLULOSE SODIUM 1 DROP: 10 GEL OPHTHALMIC at 08:30

## 2018-12-24 RX ADMIN — METRONIDAZOLE 500 MG: 500 INJECTION, SOLUTION INTRAVENOUS at 17:30

## 2018-12-24 RX ADMIN — CARBOXYMETHYLCELLULOSE SODIUM 1 DROP: 10 GEL OPHTHALMIC at 21:53

## 2018-12-24 RX ADMIN — LEVOTHYROXINE SODIUM 88 MCG: 88 TABLET ORAL at 04:53

## 2018-12-24 RX ADMIN — TIMOLOL MALEATE 1 DROP: 5 SOLUTION OPHTHALMIC at 21:53

## 2018-12-24 RX ADMIN — ASPIRIN 81 MG 81 MG: 81 TABLET ORAL at 08:30

## 2018-12-24 RX ADMIN — SODIUM CHLORIDE: 9 INJECTION, SOLUTION INTRAVENOUS at 00:14

## 2018-12-24 RX ADMIN — METRONIDAZOLE 500 MG: 500 INJECTION, SOLUTION INTRAVENOUS at 00:14

## 2018-12-24 RX ADMIN — PANTOPRAZOLE SODIUM 40 MG: 40 INJECTION, POWDER, FOR SOLUTION INTRAVENOUS at 04:54

## 2018-12-24 RX ADMIN — METRONIDAZOLE 500 MG: 500 INJECTION, SOLUTION INTRAVENOUS at 08:30

## 2018-12-24 RX ADMIN — CARBOXYMETHYLCELLULOSE SODIUM 1 DROP: 10 GEL OPHTHALMIC at 17:31

## 2018-12-24 RX ADMIN — MORPHINE SULFATE 2 MG: 2 INJECTION, SOLUTION INTRAMUSCULAR; INTRAVENOUS at 19:09

## 2018-12-24 ASSESSMENT — ENCOUNTER SYMPTOMS
COUGH: 0
NAUSEA: 0
ABDOMINAL DISTENTION: 0
SORE THROAT: 0
EYE REDNESS: 0
ABDOMINAL PAIN: 0
SHORTNESS OF BREATH: 0
COLOR CHANGE: 0
TROUBLE SWALLOWING: 0

## 2018-12-24 ASSESSMENT — PAIN SCALES - GENERAL
PAINLEVEL_OUTOF10: 8
PAINLEVEL_OUTOF10: 0
PAINLEVEL_OUTOF10: 2

## 2018-12-24 ASSESSMENT — PAIN DESCRIPTION - DESCRIPTORS: DESCRIPTORS: DISCOMFORT

## 2018-12-24 ASSESSMENT — PAIN DESCRIPTION - LOCATION: LOCATION: THROAT;ABDOMEN

## 2018-12-25 PROCEDURE — 2580000003 HC RX 258: Performed by: INTERNAL MEDICINE

## 2018-12-25 PROCEDURE — C9113 INJ PANTOPRAZOLE SODIUM, VIA: HCPCS | Performed by: INTERNAL MEDICINE

## 2018-12-25 PROCEDURE — 6370000000 HC RX 637 (ALT 250 FOR IP): Performed by: INTERNAL MEDICINE

## 2018-12-25 PROCEDURE — 99232 SBSQ HOSP IP/OBS MODERATE 35: CPT | Performed by: SURGERY

## 2018-12-25 PROCEDURE — 2700000000 HC OXYGEN THERAPY PER DAY

## 2018-12-25 PROCEDURE — 2709999900 HC NON-CHARGEABLE SUPPLY

## 2018-12-25 PROCEDURE — 2500000003 HC RX 250 WO HCPCS: Performed by: INTERNAL MEDICINE

## 2018-12-25 PROCEDURE — 6360000002 HC RX W HCPCS: Performed by: INTERNAL MEDICINE

## 2018-12-25 PROCEDURE — 1200000000 HC SEMI PRIVATE

## 2018-12-25 RX ORDER — LANOLIN ALCOHOL/MO/W.PET/CERES
3 CREAM (GRAM) TOPICAL NIGHTLY PRN
Status: DISCONTINUED | OUTPATIENT
Start: 2018-12-25 | End: 2018-12-28 | Stop reason: HOSPADM

## 2018-12-25 RX ORDER — LANOLIN ALCOHOL/MO/W.PET/CERES
3 CREAM (GRAM) TOPICAL NIGHTLY PRN
Status: DISCONTINUED | OUTPATIENT
Start: 2018-12-26 | End: 2018-12-25

## 2018-12-25 RX ORDER — ALPRAZOLAM 0.5 MG/1
0.5 TABLET ORAL NIGHTLY PRN
Status: ON HOLD | COMMUNITY
End: 2019-06-12 | Stop reason: HOSPADM

## 2018-12-25 RX ADMIN — METRONIDAZOLE 500 MG: 500 INJECTION, SOLUTION INTRAVENOUS at 16:54

## 2018-12-25 RX ADMIN — METRONIDAZOLE 500 MG: 500 INJECTION, SOLUTION INTRAVENOUS at 08:40

## 2018-12-25 RX ADMIN — METRONIDAZOLE 500 MG: 500 INJECTION, SOLUTION INTRAVENOUS at 01:36

## 2018-12-25 RX ADMIN — TIMOLOL MALEATE 1 DROP: 5 SOLUTION OPHTHALMIC at 22:03

## 2018-12-25 RX ADMIN — LEVOTHYROXINE SODIUM 88 MCG: 88 TABLET ORAL at 06:01

## 2018-12-25 RX ADMIN — CARBOXYMETHYLCELLULOSE SODIUM 1 DROP: 10 GEL OPHTHALMIC at 16:54

## 2018-12-25 RX ADMIN — CARBOXYMETHYLCELLULOSE SODIUM 1 DROP: 10 GEL OPHTHALMIC at 13:26

## 2018-12-25 RX ADMIN — CIPROFLOXACIN 200 MG: 2 INJECTION, SOLUTION INTRAVENOUS at 15:05

## 2018-12-25 RX ADMIN — TIMOLOL MALEATE 1 DROP: 5 SOLUTION OPHTHALMIC at 09:47

## 2018-12-25 RX ADMIN — ASPIRIN 81 MG 81 MG: 81 TABLET ORAL at 09:47

## 2018-12-25 RX ADMIN — CARBOXYMETHYLCELLULOSE SODIUM 1 DROP: 10 GEL OPHTHALMIC at 22:03

## 2018-12-25 RX ADMIN — SODIUM CHLORIDE: 9 INJECTION, SOLUTION INTRAVENOUS at 08:40

## 2018-12-25 RX ADMIN — MORPHINE SULFATE 2 MG: 2 INJECTION, SOLUTION INTRAMUSCULAR; INTRAVENOUS at 01:41

## 2018-12-25 RX ADMIN — CARBOXYMETHYLCELLULOSE SODIUM 1 DROP: 10 GEL OPHTHALMIC at 09:47

## 2018-12-25 RX ADMIN — PANTOPRAZOLE SODIUM 40 MG: 40 INJECTION, POWDER, FOR SOLUTION INTRAVENOUS at 06:01

## 2018-12-25 RX ADMIN — ONDANSETRON 4 MG: 2 INJECTION INTRAMUSCULAR; INTRAVENOUS at 13:26

## 2018-12-25 RX ADMIN — CIPROFLOXACIN 200 MG: 2 INJECTION, SOLUTION INTRAVENOUS at 04:25

## 2018-12-25 ASSESSMENT — PAIN SCALES - GENERAL
PAINLEVEL_OUTOF10: 8
PAINLEVEL_OUTOF10: 8

## 2018-12-25 ASSESSMENT — PAIN DESCRIPTION - PAIN TYPE: TYPE: CHRONIC PAIN

## 2018-12-25 ASSESSMENT — PAIN DESCRIPTION - LOCATION: LOCATION: BACK

## 2018-12-26 LAB
ANION GAP SERPL CALCULATED.3IONS-SCNC: 10 MEQ/L (ref 8–16)
BASOPHILS # BLD: 0.1 %
BASOPHILS ABSOLUTE: 0 THOU/MM3 (ref 0–0.1)
BUN BLDV-MCNC: 7 MG/DL (ref 7–22)
CALCIUM SERPL-MCNC: 7.7 MG/DL (ref 8.5–10.5)
CHLORIDE BLD-SCNC: 106 MEQ/L (ref 98–111)
CO2: 23 MEQ/L (ref 23–33)
CREAT SERPL-MCNC: 0.5 MG/DL (ref 0.4–1.2)
EOSINOPHIL # BLD: 1 %
EOSINOPHILS ABSOLUTE: 0.1 THOU/MM3 (ref 0–0.4)
ERYTHROCYTE [DISTWIDTH] IN BLOOD BY AUTOMATED COUNT: 18.9 % (ref 11.5–14.5)
ERYTHROCYTE [DISTWIDTH] IN BLOOD BY AUTOMATED COUNT: 58 FL (ref 35–45)
GFR SERPL CREATININE-BSD FRML MDRD: > 90 ML/MIN/1.73M2
GLUCOSE BLD-MCNC: 96 MG/DL (ref 70–108)
HCT VFR BLD CALC: 31.7 % (ref 37–47)
HEMOGLOBIN: 9.1 GM/DL (ref 12–16)
HYPOCHROMIA: PRESENT
IMMATURE GRANS (ABS): 0.03 THOU/MM3 (ref 0–0.07)
IMMATURE GRANULOCYTES: 0.4 %
LYMPHOCYTES # BLD: 19 %
LYMPHOCYTES ABSOLUTE: 1.3 THOU/MM3 (ref 1–4.8)
MCH RBC QN AUTO: 23.9 PG (ref 26–33)
MCHC RBC AUTO-ENTMCNC: 28.7 GM/DL (ref 32.2–35.5)
MCV RBC AUTO: 83.4 FL (ref 81–99)
MONOCYTES # BLD: 11.6 %
MONOCYTES ABSOLUTE: 0.8 THOU/MM3 (ref 0.4–1.3)
NUCLEATED RED BLOOD CELLS: 0 /100 WBC
PLATELET # BLD: 162 THOU/MM3 (ref 130–400)
PMV BLD AUTO: 12.2 FL (ref 9.4–12.4)
POTASSIUM SERPL-SCNC: 3.4 MEQ/L (ref 3.5–5.2)
RBC # BLD: 3.8 MILL/MM3 (ref 4.2–5.4)
SEG NEUTROPHILS: 67.9 %
SEGMENTED NEUTROPHILS ABSOLUTE COUNT: 4.7 THOU/MM3 (ref 1.8–7.7)
SODIUM BLD-SCNC: 139 MEQ/L (ref 135–145)
WBC # BLD: 6.9 THOU/MM3 (ref 4.8–10.8)

## 2018-12-26 PROCEDURE — 85025 COMPLETE CBC W/AUTO DIFF WBC: CPT

## 2018-12-26 PROCEDURE — C9113 INJ PANTOPRAZOLE SODIUM, VIA: HCPCS | Performed by: INTERNAL MEDICINE

## 2018-12-26 PROCEDURE — 1200000000 HC SEMI PRIVATE

## 2018-12-26 PROCEDURE — 80048 BASIC METABOLIC PNL TOTAL CA: CPT

## 2018-12-26 PROCEDURE — 6370000000 HC RX 637 (ALT 250 FOR IP): Performed by: INTERNAL MEDICINE

## 2018-12-26 PROCEDURE — 2500000003 HC RX 250 WO HCPCS: Performed by: INTERNAL MEDICINE

## 2018-12-26 PROCEDURE — 97166 OT EVAL MOD COMPLEX 45 MIN: CPT

## 2018-12-26 PROCEDURE — G8988 SELF CARE GOAL STATUS: HCPCS

## 2018-12-26 PROCEDURE — 36415 COLL VENOUS BLD VENIPUNCTURE: CPT

## 2018-12-26 PROCEDURE — G8987 SELF CARE CURRENT STATUS: HCPCS

## 2018-12-26 PROCEDURE — 2709999900 HC NON-CHARGEABLE SUPPLY

## 2018-12-26 PROCEDURE — 97535 SELF CARE MNGMENT TRAINING: CPT

## 2018-12-26 PROCEDURE — 6360000002 HC RX W HCPCS: Performed by: INTERNAL MEDICINE

## 2018-12-26 PROCEDURE — 2700000000 HC OXYGEN THERAPY PER DAY

## 2018-12-26 RX ORDER — POTASSIUM CHLORIDE 7.45 MG/ML
10 INJECTION INTRAVENOUS
Status: DISCONTINUED | OUTPATIENT
Start: 2018-12-26 | End: 2018-12-26

## 2018-12-26 RX ORDER — POTASSIUM CHLORIDE 20 MEQ/1
40 TABLET, EXTENDED RELEASE ORAL 2 TIMES DAILY WITH MEALS
Status: DISCONTINUED | OUTPATIENT
Start: 2018-12-26 | End: 2018-12-28 | Stop reason: HOSPADM

## 2018-12-26 RX ADMIN — TIMOLOL MALEATE 1 DROP: 5 SOLUTION OPHTHALMIC at 20:07

## 2018-12-26 RX ADMIN — PANTOPRAZOLE SODIUM 40 MG: 40 INJECTION, POWDER, FOR SOLUTION INTRAVENOUS at 07:40

## 2018-12-26 RX ADMIN — POTASSIUM CHLORIDE 40 MEQ: 1500 TABLET, EXTENDED RELEASE ORAL at 17:16

## 2018-12-26 RX ADMIN — CARBOXYMETHYLCELLULOSE SODIUM 1 DROP: 10 GEL OPHTHALMIC at 13:00

## 2018-12-26 RX ADMIN — ASPIRIN 81 MG 81 MG: 81 TABLET ORAL at 08:51

## 2018-12-26 RX ADMIN — CIPROFLOXACIN 200 MG: 2 INJECTION, SOLUTION INTRAVENOUS at 04:15

## 2018-12-26 RX ADMIN — MORPHINE SULFATE 2 MG: 2 INJECTION, SOLUTION INTRAMUSCULAR; INTRAVENOUS at 18:48

## 2018-12-26 RX ADMIN — Medication 3 MG: at 00:24

## 2018-12-26 RX ADMIN — LEVOTHYROXINE SODIUM 88 MCG: 88 TABLET ORAL at 07:40

## 2018-12-26 RX ADMIN — CARBOXYMETHYLCELLULOSE SODIUM 1 DROP: 10 GEL OPHTHALMIC at 20:07

## 2018-12-26 RX ADMIN — METRONIDAZOLE 500 MG: 500 INJECTION, SOLUTION INTRAVENOUS at 00:23

## 2018-12-26 RX ADMIN — METRONIDAZOLE 500 MG: 500 INJECTION, SOLUTION INTRAVENOUS at 08:57

## 2018-12-26 RX ADMIN — CARBOXYMETHYLCELLULOSE SODIUM 1 DROP: 10 GEL OPHTHALMIC at 16:27

## 2018-12-26 RX ADMIN — CARBOXYMETHYLCELLULOSE SODIUM 1 DROP: 10 GEL OPHTHALMIC at 08:51

## 2018-12-26 RX ADMIN — METRONIDAZOLE 500 MG: 500 INJECTION, SOLUTION INTRAVENOUS at 17:16

## 2018-12-26 RX ADMIN — TIMOLOL MALEATE 1 DROP: 5 SOLUTION OPHTHALMIC at 08:51

## 2018-12-26 RX ADMIN — CIPROFLOXACIN 200 MG: 2 INJECTION, SOLUTION INTRAVENOUS at 16:14

## 2018-12-26 ASSESSMENT — PAIN SCALES - GENERAL
PAINLEVEL_OUTOF10: 8
PAINLEVEL_OUTOF10: 0
PAINLEVEL_OUTOF10: 7
PAINLEVEL_OUTOF10: 8
PAINLEVEL_OUTOF10: 0

## 2018-12-26 ASSESSMENT — PAIN DESCRIPTION - LOCATION: LOCATION: BACK

## 2018-12-26 ASSESSMENT — PAIN DESCRIPTION - ORIENTATION: ORIENTATION: MID

## 2018-12-26 ASSESSMENT — PAIN DESCRIPTION - DESCRIPTORS: DESCRIPTORS: DISCOMFORT;ACHING

## 2018-12-26 ASSESSMENT — PAIN DESCRIPTION - PROGRESSION: CLINICAL_PROGRESSION: NOT CHANGED

## 2018-12-26 ASSESSMENT — PAIN DESCRIPTION - FREQUENCY: FREQUENCY: CONTINUOUS

## 2018-12-26 ASSESSMENT — PAIN DESCRIPTION - PAIN TYPE: TYPE: CHRONIC PAIN

## 2018-12-27 PROBLEM — N17.9 AKI (ACUTE KIDNEY INJURY) (HCC): Status: ACTIVE | Noted: 2018-12-27

## 2018-12-27 PROCEDURE — G8979 MOBILITY GOAL STATUS: HCPCS

## 2018-12-27 PROCEDURE — 1200000000 HC SEMI PRIVATE

## 2018-12-27 PROCEDURE — 2709999900 HC NON-CHARGEABLE SUPPLY

## 2018-12-27 PROCEDURE — 6370000000 HC RX 637 (ALT 250 FOR IP): Performed by: INTERNAL MEDICINE

## 2018-12-27 PROCEDURE — 97161 PT EVAL LOW COMPLEX 20 MIN: CPT

## 2018-12-27 PROCEDURE — 97110 THERAPEUTIC EXERCISES: CPT

## 2018-12-27 PROCEDURE — 2500000003 HC RX 250 WO HCPCS: Performed by: INTERNAL MEDICINE

## 2018-12-27 PROCEDURE — 6360000002 HC RX W HCPCS: Performed by: INTERNAL MEDICINE

## 2018-12-27 PROCEDURE — G8978 MOBILITY CURRENT STATUS: HCPCS

## 2018-12-27 PROCEDURE — 2580000003 HC RX 258: Performed by: INTERNAL MEDICINE

## 2018-12-27 RX ADMIN — TIMOLOL MALEATE 1 DROP: 5 SOLUTION OPHTHALMIC at 19:48

## 2018-12-27 RX ADMIN — CARBOXYMETHYLCELLULOSE SODIUM 1 DROP: 10 GEL OPHTHALMIC at 08:09

## 2018-12-27 RX ADMIN — METRONIDAZOLE 500 MG: 500 INJECTION, SOLUTION INTRAVENOUS at 01:43

## 2018-12-27 RX ADMIN — MORPHINE SULFATE 2 MG: 2 INJECTION, SOLUTION INTRAMUSCULAR; INTRAVENOUS at 10:59

## 2018-12-27 RX ADMIN — SODIUM CHLORIDE: 9 INJECTION, SOLUTION INTRAVENOUS at 10:59

## 2018-12-27 RX ADMIN — TIMOLOL MALEATE 1 DROP: 5 SOLUTION OPHTHALMIC at 08:09

## 2018-12-27 RX ADMIN — CIPROFLOXACIN 200 MG: 2 INJECTION, SOLUTION INTRAVENOUS at 03:06

## 2018-12-27 RX ADMIN — POTASSIUM CHLORIDE 40 MEQ: 1500 TABLET, EXTENDED RELEASE ORAL at 17:28

## 2018-12-27 RX ADMIN — LEVOTHYROXINE SODIUM 88 MCG: 88 TABLET ORAL at 06:01

## 2018-12-27 RX ADMIN — METRONIDAZOLE 500 MG: 500 INJECTION, SOLUTION INTRAVENOUS at 11:00

## 2018-12-27 RX ADMIN — CIPROFLOXACIN 200 MG: 2 INJECTION, SOLUTION INTRAVENOUS at 15:41

## 2018-12-27 RX ADMIN — CARBOXYMETHYLCELLULOSE SODIUM 1 DROP: 10 GEL OPHTHALMIC at 17:28

## 2018-12-27 RX ADMIN — METRONIDAZOLE 500 MG: 500 INJECTION, SOLUTION INTRAVENOUS at 17:28

## 2018-12-27 RX ADMIN — Medication 3 MG: at 19:49

## 2018-12-27 RX ADMIN — CARBOXYMETHYLCELLULOSE SODIUM 1 DROP: 10 GEL OPHTHALMIC at 13:34

## 2018-12-27 RX ADMIN — ASPIRIN 81 MG 81 MG: 81 TABLET ORAL at 08:09

## 2018-12-27 RX ADMIN — CARBOXYMETHYLCELLULOSE SODIUM 1 DROP: 10 GEL OPHTHALMIC at 19:48

## 2018-12-27 RX ADMIN — POTASSIUM CHLORIDE 40 MEQ: 1500 TABLET, EXTENDED RELEASE ORAL at 08:09

## 2018-12-27 ASSESSMENT — PAIN SCALES - GENERAL
PAINLEVEL_OUTOF10: 0
PAINLEVEL_OUTOF10: 6
PAINLEVEL_OUTOF10: 0
PAINLEVEL_OUTOF10: 0
PAINLEVEL_OUTOF10: 3

## 2018-12-27 ASSESSMENT — PAIN DESCRIPTION - PAIN TYPE: TYPE: CHRONIC PAIN

## 2018-12-27 ASSESSMENT — PAIN DESCRIPTION - LOCATION: LOCATION: BACK

## 2018-12-28 VITALS
BODY MASS INDEX: 26.54 KG/M2 | OXYGEN SATURATION: 95 % | SYSTOLIC BLOOD PRESSURE: 140 MMHG | HEIGHT: 63 IN | RESPIRATION RATE: 16 BRPM | HEART RATE: 68 BPM | TEMPERATURE: 98.8 F | WEIGHT: 149.8 LBS | DIASTOLIC BLOOD PRESSURE: 64 MMHG

## 2018-12-28 PROCEDURE — 6370000000 HC RX 637 (ALT 250 FOR IP): Performed by: INTERNAL MEDICINE

## 2018-12-28 PROCEDURE — 6360000002 HC RX W HCPCS: Performed by: INTERNAL MEDICINE

## 2018-12-28 PROCEDURE — 97535 SELF CARE MNGMENT TRAINING: CPT

## 2018-12-28 PROCEDURE — 2500000003 HC RX 250 WO HCPCS: Performed by: INTERNAL MEDICINE

## 2018-12-28 PROCEDURE — 97110 THERAPEUTIC EXERCISES: CPT

## 2018-12-28 PROCEDURE — C9113 INJ PANTOPRAZOLE SODIUM, VIA: HCPCS | Performed by: INTERNAL MEDICINE

## 2018-12-28 PROCEDURE — 2580000003 HC RX 258: Performed by: INTERNAL MEDICINE

## 2018-12-28 RX ADMIN — CARBOXYMETHYLCELLULOSE SODIUM 1 DROP: 10 GEL OPHTHALMIC at 08:17

## 2018-12-28 RX ADMIN — PANTOPRAZOLE SODIUM 40 MG: 40 INJECTION, POWDER, FOR SOLUTION INTRAVENOUS at 06:02

## 2018-12-28 RX ADMIN — LEVOTHYROXINE SODIUM 88 MCG: 88 TABLET ORAL at 06:02

## 2018-12-28 RX ADMIN — ASPIRIN 81 MG 81 MG: 81 TABLET ORAL at 08:17

## 2018-12-28 RX ADMIN — METRONIDAZOLE 500 MG: 500 INJECTION, SOLUTION INTRAVENOUS at 01:44

## 2018-12-28 RX ADMIN — SODIUM CHLORIDE: 9 INJECTION, SOLUTION INTRAVENOUS at 04:45

## 2018-12-28 RX ADMIN — CIPROFLOXACIN 200 MG: 2 INJECTION, SOLUTION INTRAVENOUS at 04:52

## 2018-12-28 RX ADMIN — TIMOLOL MALEATE 1 DROP: 5 SOLUTION OPHTHALMIC at 08:17

## 2018-12-28 RX ADMIN — POTASSIUM CHLORIDE 40 MEQ: 1500 TABLET, EXTENDED RELEASE ORAL at 08:17

## 2018-12-28 ASSESSMENT — PAIN SCALES - GENERAL
PAINLEVEL_OUTOF10: 0

## 2019-01-02 ENCOUNTER — TELEPHONE (OUTPATIENT)
Dept: FAMILY MEDICINE CLINIC | Age: 84
End: 2019-01-02

## 2019-01-03 ENCOUNTER — OFFICE VISIT (OUTPATIENT)
Dept: FAMILY MEDICINE CLINIC | Age: 84
End: 2019-01-03

## 2019-01-03 VITALS
HEIGHT: 63 IN | WEIGHT: 149.38 LBS | DIASTOLIC BLOOD PRESSURE: 68 MMHG | SYSTOLIC BLOOD PRESSURE: 132 MMHG | BODY MASS INDEX: 26.47 KG/M2 | HEART RATE: 88 BPM | RESPIRATION RATE: 13 BRPM

## 2019-01-03 DIAGNOSIS — N17.9 AKI (ACUTE KIDNEY INJURY) (HCC): ICD-10-CM

## 2019-01-03 DIAGNOSIS — K56.600 PARTIAL SMALL BOWEL OBSTRUCTION (HCC): ICD-10-CM

## 2019-01-03 DIAGNOSIS — C78.7 CARCINOMA OF BREAST METASTATIC TO LIVER, UNSPECIFIED LATERALITY (HCC): ICD-10-CM

## 2019-01-03 DIAGNOSIS — R10.9 ACUTE RIGHT FLANK PAIN: Primary | ICD-10-CM

## 2019-01-03 DIAGNOSIS — E03.4 HYPOTHYROIDISM DUE TO ACQUIRED ATROPHY OF THYROID: ICD-10-CM

## 2019-01-03 DIAGNOSIS — M31.6 TEMPORAL ARTERITIS (HCC): ICD-10-CM

## 2019-01-03 DIAGNOSIS — C50.919 CARCINOMA OF BREAST METASTATIC TO LIVER, UNSPECIFIED LATERALITY (HCC): ICD-10-CM

## 2019-01-03 DIAGNOSIS — K56.699 DIVERTICULAR STRICTURE (HCC): ICD-10-CM

## 2019-01-03 LAB
BACTERIA URINE, POC: NORMAL
BILIRUBIN URINE: 0 MG/DL
BLOOD, URINE: NEGATIVE
CASTS URINE, POC: NORMAL
CLARITY: CLEAR
COLOR: YELLOW
CRYSTALS URINE, POC: NORMAL
EPI CELLS URINE, POC: NORMAL
GLUCOSE URINE: NEGATIVE
KETONES, URINE: NEGATIVE
LEUKOCYTE EST, POC: NEGATIVE
NITRITE, URINE: NEGATIVE
PH UA: 5 (ref 4.5–8)
PROTEIN UA: POSITIVE
RBC URINE, POC: NORMAL
SPECIFIC GRAVITY UA: 1.03 (ref 1–1.03)
UROBILINOGEN, URINE: NORMAL
WBC URINE, POC: NORMAL
YEAST URINE, POC: NORMAL

## 2019-01-03 PROCEDURE — 1111F DSCHRG MED/CURRENT MED MERGE: CPT | Performed by: EMERGENCY MEDICINE

## 2019-01-03 PROCEDURE — G8427 DOCREV CUR MEDS BY ELIG CLIN: HCPCS | Performed by: EMERGENCY MEDICINE

## 2019-01-03 PROCEDURE — 1036F TOBACCO NON-USER: CPT | Performed by: EMERGENCY MEDICINE

## 2019-01-03 PROCEDURE — 99214 OFFICE O/P EST MOD 30 MIN: CPT | Performed by: EMERGENCY MEDICINE

## 2019-01-03 PROCEDURE — 81000 URINALYSIS NONAUTO W/SCOPE: CPT | Performed by: EMERGENCY MEDICINE

## 2019-01-03 PROCEDURE — G8417 CALC BMI ABV UP PARAM F/U: HCPCS | Performed by: EMERGENCY MEDICINE

## 2019-01-03 PROCEDURE — G8598 ASA/ANTIPLAT THER USED: HCPCS | Performed by: EMERGENCY MEDICINE

## 2019-01-03 PROCEDURE — 1101F PT FALLS ASSESS-DOCD LE1/YR: CPT | Performed by: EMERGENCY MEDICINE

## 2019-01-03 PROCEDURE — G8399 PT W/DXA RESULTS DOCUMENT: HCPCS | Performed by: EMERGENCY MEDICINE

## 2019-01-03 PROCEDURE — 1090F PRES/ABSN URINE INCON ASSESS: CPT | Performed by: EMERGENCY MEDICINE

## 2019-01-03 PROCEDURE — 4040F PNEUMOC VAC/ADMIN/RCVD: CPT | Performed by: EMERGENCY MEDICINE

## 2019-01-03 PROCEDURE — 1123F ACP DISCUSS/DSCN MKR DOCD: CPT | Performed by: EMERGENCY MEDICINE

## 2019-01-03 ASSESSMENT — ENCOUNTER SYMPTOMS
DIARRHEA: 0
NAUSEA: 0
RHINORRHEA: 1
VOICE CHANGE: 0
BACK PAIN: 0
SHORTNESS OF BREATH: 0
VOMITING: 0
SORE THROAT: 0
CONSTIPATION: 0
ABDOMINAL PAIN: 0
COUGH: 1
TROUBLE SWALLOWING: 0
SINUS PRESSURE: 0
CHEST TIGHTNESS: 0
WHEEZING: 0

## 2019-02-08 ENCOUNTER — HOSPITAL ENCOUNTER (OUTPATIENT)
Dept: NON INVASIVE DIAGNOSTICS | Age: 84
Discharge: HOME | End: 2019-02-08
Payer: MEDICARE

## 2019-02-08 LAB
LV EF: 60 %
LVEF MODALITY: NORMAL

## 2019-02-08 PROCEDURE — 93306 TTE W/DOPPLER COMPLETE: CPT

## 2019-02-12 LAB
ABSOLUTE BASO #: 0 K/UL (ref 0–0.1)
ABSOLUTE EOS #: 0.1 K/UL (ref 0.1–0.4)
ABSOLUTE LYMPH #: 1.8 K/UL (ref 0.8–5.2)
ABSOLUTE MONO #: 0.5 K/UL (ref 0.1–0.9)
ABSOLUTE NEUT #: 5.5 K/UL (ref 1.3–9.1)
BASOPHILS RELATIVE PERCENT: 0.1 %
EOSINOPHILS RELATIVE PERCENT: 1.8 %
HCT VFR BLD CALC: 31.4 % (ref 36–48)
HEMOGLOBIN: 9.7 G/DL (ref 12–16)
LYMPHOCYTE %: 22.2 %
MCH RBC QN AUTO: 24.3 PG (ref 27–34)
MCHC RBC AUTO-ENTMCNC: 30.9 G/DL (ref 31–36)
MCV RBC AUTO: 78.7 FL (ref 80–100)
MONOCYTES # BLD: 6.6 %
NEUTROPHILS RELATIVE PERCENT: 68.7 %
PDW BLD-RTO: 17.2 % (ref 10.8–14.8)
PLATELETS: 196 K/UL (ref 150–450)
RBC: 3.99 M/UL (ref 4–5.5)
WBC: 8 K/UL (ref 3.7–10.8)

## 2019-02-19 RX ORDER — LEVOTHYROXINE SODIUM 88 UG/1
TABLET ORAL
Qty: 90 TABLET | Refills: 1 | Status: SHIPPED | OUTPATIENT
Start: 2019-02-19 | End: 2019-08-13 | Stop reason: SDUPTHER

## 2019-03-11 RX ORDER — ISOSORBIDE MONONITRATE 30 MG/1
TABLET, EXTENDED RELEASE ORAL
Qty: 45 TABLET | Refills: 3 | Status: SHIPPED | OUTPATIENT
Start: 2019-03-11 | End: 2020-01-13

## 2019-03-21 ENCOUNTER — OFFICE VISIT (OUTPATIENT)
Dept: FAMILY MEDICINE CLINIC | Age: 84
End: 2019-03-21

## 2019-03-21 VITALS
SYSTOLIC BLOOD PRESSURE: 110 MMHG | HEIGHT: 63 IN | RESPIRATION RATE: 14 BRPM | DIASTOLIC BLOOD PRESSURE: 68 MMHG | HEART RATE: 66 BPM | WEIGHT: 149.2 LBS | BODY MASS INDEX: 26.44 KG/M2

## 2019-03-21 DIAGNOSIS — Z79.899 PATIENT ON ANTINEOPLASTIC CHEMOTHERAPY REGIMEN: ICD-10-CM

## 2019-03-21 DIAGNOSIS — C50.912 CARCINOMA OF LEFT BREAST METASTATIC TO LIVER (HCC): ICD-10-CM

## 2019-03-21 DIAGNOSIS — H54.40 BLIND RIGHT EYE: ICD-10-CM

## 2019-03-21 DIAGNOSIS — R29.6 RECURRENT FALLS: Primary | ICD-10-CM

## 2019-03-21 DIAGNOSIS — M31.6 TEMPORAL ARTERITIS (HCC): ICD-10-CM

## 2019-03-21 DIAGNOSIS — C78.7 CARCINOMA OF LEFT BREAST METASTATIC TO LIVER (HCC): ICD-10-CM

## 2019-03-21 PROCEDURE — 4040F PNEUMOC VAC/ADMIN/RCVD: CPT | Performed by: EMERGENCY MEDICINE

## 2019-03-21 PROCEDURE — G8427 DOCREV CUR MEDS BY ELIG CLIN: HCPCS | Performed by: EMERGENCY MEDICINE

## 2019-03-21 PROCEDURE — 1090F PRES/ABSN URINE INCON ASSESS: CPT | Performed by: EMERGENCY MEDICINE

## 2019-03-21 PROCEDURE — 1123F ACP DISCUSS/DSCN MKR DOCD: CPT | Performed by: EMERGENCY MEDICINE

## 2019-03-21 PROCEDURE — 1036F TOBACCO NON-USER: CPT | Performed by: EMERGENCY MEDICINE

## 2019-03-21 PROCEDURE — 99214 OFFICE O/P EST MOD 30 MIN: CPT | Performed by: EMERGENCY MEDICINE

## 2019-03-21 PROCEDURE — G8399 PT W/DXA RESULTS DOCUMENT: HCPCS | Performed by: EMERGENCY MEDICINE

## 2019-03-21 PROCEDURE — G8598 ASA/ANTIPLAT THER USED: HCPCS | Performed by: EMERGENCY MEDICINE

## 2019-03-21 PROCEDURE — G8417 CALC BMI ABV UP PARAM F/U: HCPCS | Performed by: EMERGENCY MEDICINE

## 2019-03-21 ASSESSMENT — ENCOUNTER SYMPTOMS
CHEST TIGHTNESS: 0
WHEEZING: 0
TROUBLE SWALLOWING: 0
SHORTNESS OF BREATH: 0
ABDOMINAL PAIN: 0
SINUS PRESSURE: 0
CONSTIPATION: 0
VOMITING: 0
DIARRHEA: 0
RHINORRHEA: 0
NAUSEA: 0
COUGH: 0
VOICE CHANGE: 0
SORE THROAT: 0
BACK PAIN: 0

## 2019-03-21 ASSESSMENT — PATIENT HEALTH QUESTIONNAIRE - PHQ9
2. FEELING DOWN, DEPRESSED OR HOPELESS: 0
SUM OF ALL RESPONSES TO PHQ9 QUESTIONS 1 & 2: 0
SUM OF ALL RESPONSES TO PHQ QUESTIONS 1-9: 0
SUM OF ALL RESPONSES TO PHQ QUESTIONS 1-9: 0
1. LITTLE INTEREST OR PLEASURE IN DOING THINGS: 0

## 2019-03-31 DIAGNOSIS — M85.89 OSTEOPENIA OF MULTIPLE SITES: ICD-10-CM

## 2019-04-01 RX ORDER — CALCIUM CARBONATE/VITAMIN D3 500 MG-10
TABLET ORAL
Qty: 100 TABLET | Refills: 3 | Status: ON HOLD | OUTPATIENT
Start: 2019-04-01 | End: 2019-06-12 | Stop reason: HOSPADM

## 2019-04-02 ENCOUNTER — TELEPHONE (OUTPATIENT)
Dept: FAMILY MEDICINE CLINIC | Age: 84
End: 2019-04-02

## 2019-04-02 NOTE — TELEPHONE ENCOUNTER
Left mom for patient to return call, need to know if she still needs supplies for wound care. We received a request from [facility] for supplies but are not sure if she still needs them.

## 2019-04-09 LAB
ABSOLUTE BASO #: 0 K/UL (ref 0–0.1)
ABSOLUTE EOS #: 0.1 K/UL (ref 0.1–0.4)
ABSOLUTE LYMPH #: 2.1 K/UL (ref 0.8–5.2)
ABSOLUTE MONO #: 0.6 K/UL (ref 0.1–0.9)
ABSOLUTE NEUT #: 6.9 K/UL (ref 1.3–9.1)
BASOPHILS RELATIVE PERCENT: 0.1 %
EOSINOPHILS RELATIVE PERCENT: 0.9 %
HCT VFR BLD CALC: 35.2 % (ref 36–48)
HEMOGLOBIN: 11.1 G/DL (ref 12–16)
LYMPHOCYTE %: 21.3 %
MCH RBC QN AUTO: 26.2 PG (ref 27–34)
MCHC RBC AUTO-ENTMCNC: 31.5 G/DL (ref 31–36)
MCV RBC AUTO: 83.2 FL (ref 80–100)
MONOCYTES # BLD: 6.3 %
NEUTROPHILS RELATIVE PERCENT: 71.1 %
PDW BLD-RTO: 17 % (ref 10.8–14.8)
PLATELETS: 168 K/UL (ref 150–450)
RBC: 4.23 M/UL (ref 4–5.5)
WBC: 9.6 K/UL (ref 3.7–10.8)

## 2019-04-10 ENCOUNTER — HOSPITAL ENCOUNTER (OUTPATIENT)
Dept: CT IMAGING | Age: 84
Discharge: HOME | End: 2019-04-10
Payer: MEDICARE

## 2019-04-10 DIAGNOSIS — C78.7 SECONDARY MALIGNANT NEOPLASM OF LIVER (HCC): ICD-10-CM

## 2019-04-10 LAB — POC CREATININE WHOLE BLOOD: 0.7 MG/DL (ref 0.5–1.2)

## 2019-04-10 PROCEDURE — 82565 ASSAY OF CREATININE: CPT

## 2019-04-10 PROCEDURE — 74177 CT ABD & PELVIS W/CONTRAST: CPT

## 2019-04-10 PROCEDURE — 6360000004 HC RX CONTRAST MEDICATION: Performed by: INTERNAL MEDICINE

## 2019-04-10 RX ADMIN — IOPAMIDOL 85 ML: 755 INJECTION, SOLUTION INTRAVENOUS at 14:17

## 2019-04-10 RX ADMIN — IOHEXOL 50 ML: 240 INJECTION, SOLUTION INTRATHECAL; INTRAVASCULAR; INTRAVENOUS; ORAL at 14:17

## 2019-04-16 ENCOUNTER — TELEPHONE (OUTPATIENT)
Dept: FAMILY MEDICINE CLINIC | Age: 84
End: 2019-04-16

## 2019-05-21 ENCOUNTER — APPOINTMENT (OUTPATIENT)
Dept: GENERAL RADIOLOGY | Age: 84
DRG: 536 | End: 2019-05-21
Payer: MEDICARE

## 2019-05-21 ENCOUNTER — HOSPITAL ENCOUNTER (INPATIENT)
Age: 84
LOS: 3 days | Discharge: NURSING, REHAB OR LONG-TERM CARE | DRG: 536 | End: 2019-05-24
Attending: FAMILY MEDICINE | Admitting: ORTHOPAEDIC SURGERY
Payer: MEDICARE

## 2019-05-21 DIAGNOSIS — S32.9XXA: Primary | ICD-10-CM

## 2019-05-21 LAB
ANION GAP SERPL CALCULATED.3IONS-SCNC: 16 MEQ/L (ref 8–16)
APTT: 31.2 SECONDS (ref 22–38)
BASOPHILS # BLD: 0.1 %
BASOPHILS ABSOLUTE: 0 THOU/MM3 (ref 0–0.1)
BUN BLDV-MCNC: 17 MG/DL (ref 7–22)
CALCIUM SERPL-MCNC: 8.7 MG/DL (ref 8.5–10.5)
CHLORIDE BLD-SCNC: 108 MEQ/L (ref 98–111)
CO2: 20 MEQ/L (ref 23–33)
CREAT SERPL-MCNC: 0.7 MG/DL (ref 0.4–1.2)
EKG ATRIAL RATE: 70 BPM
EKG P AXIS: 70 DEGREES
EKG P-R INTERVAL: 168 MS
EKG Q-T INTERVAL: 420 MS
EKG QRS DURATION: 88 MS
EKG QTC CALCULATION (BAZETT): 453 MS
EKG R AXIS: 0 DEGREES
EKG T AXIS: 116 DEGREES
EKG VENTRICULAR RATE: 70 BPM
EOSINOPHIL # BLD: 0.2 %
EOSINOPHILS ABSOLUTE: 0 THOU/MM3 (ref 0–0.4)
ERYTHROCYTE [DISTWIDTH] IN BLOOD BY AUTOMATED COUNT: 17.2 % (ref 11.5–14.5)
ERYTHROCYTE [DISTWIDTH] IN BLOOD BY AUTOMATED COUNT: 58.3 FL (ref 35–45)
GFR SERPL CREATININE-BSD FRML MDRD: 79 ML/MIN/1.73M2
GLUCOSE BLD-MCNC: 186 MG/DL (ref 70–108)
HCT VFR BLD CALC: 37.9 % (ref 37–47)
HEMOGLOBIN: 11.5 GM/DL (ref 12–16)
IMMATURE GRANS (ABS): 0.3 THOU/MM3 (ref 0–0.07)
IMMATURE GRANULOCYTES: 3.3 %
INR BLD: 0.91 (ref 0.85–1.13)
LYMPHOCYTES # BLD: 9 %
LYMPHOCYTES ABSOLUTE: 0.8 THOU/MM3 (ref 1–4.8)
MCH RBC QN AUTO: 27.6 PG (ref 26–33)
MCHC RBC AUTO-ENTMCNC: 30.3 GM/DL (ref 32.2–35.5)
MCV RBC AUTO: 91.1 FL (ref 81–99)
MONOCYTES # BLD: 0.9 %
MONOCYTES ABSOLUTE: 0.1 THOU/MM3 (ref 0.4–1.3)
NUCLEATED RED BLOOD CELLS: 0 /100 WBC
OSMOLALITY CALCULATION: 293.2 MOSMOL/KG (ref 275–300)
PLATELET # BLD: 147 THOU/MM3 (ref 130–400)
PMV BLD AUTO: 12 FL (ref 9.4–12.4)
POTASSIUM SERPL-SCNC: 4.6 MEQ/L (ref 3.5–5.2)
RBC # BLD: 4.16 MILL/MM3 (ref 4.2–5.4)
SEG NEUTROPHILS: 86.5 %
SEGMENTED NEUTROPHILS ABSOLUTE COUNT: 7.9 THOU/MM3 (ref 1.8–7.7)
SODIUM BLD-SCNC: 144 MEQ/L (ref 135–145)
WBC # BLD: 9.1 THOU/MM3 (ref 4.8–10.8)

## 2019-05-21 PROCEDURE — 99285 EMERGENCY DEPT VISIT HI MDM: CPT

## 2019-05-21 PROCEDURE — 96372 THER/PROPH/DIAG INJ SC/IM: CPT

## 2019-05-21 PROCEDURE — 1200000000 HC SEMI PRIVATE

## 2019-05-21 PROCEDURE — 93010 ELECTROCARDIOGRAM REPORT: CPT | Performed by: INTERNAL MEDICINE

## 2019-05-21 PROCEDURE — 6360000002 HC RX W HCPCS: Performed by: FAMILY MEDICINE

## 2019-05-21 PROCEDURE — 36415 COLL VENOUS BLD VENIPUNCTURE: CPT

## 2019-05-21 PROCEDURE — 71045 X-RAY EXAM CHEST 1 VIEW: CPT

## 2019-05-21 PROCEDURE — 6370000000 HC RX 637 (ALT 250 FOR IP): Performed by: FAMILY MEDICINE

## 2019-05-21 PROCEDURE — 73502 X-RAY EXAM HIP UNI 2-3 VIEWS: CPT

## 2019-05-21 PROCEDURE — 6360000002 HC RX W HCPCS: Performed by: PHYSICIAN ASSISTANT

## 2019-05-21 PROCEDURE — 72100 X-RAY EXAM L-S SPINE 2/3 VWS: CPT

## 2019-05-21 PROCEDURE — 85610 PROTHROMBIN TIME: CPT

## 2019-05-21 PROCEDURE — 73060 X-RAY EXAM OF HUMERUS: CPT

## 2019-05-21 PROCEDURE — 80048 BASIC METABOLIC PNL TOTAL CA: CPT

## 2019-05-21 PROCEDURE — 2709999900 HC NON-CHARGEABLE SUPPLY

## 2019-05-21 PROCEDURE — 85730 THROMBOPLASTIN TIME PARTIAL: CPT

## 2019-05-21 PROCEDURE — 93005 ELECTROCARDIOGRAM TRACING: CPT | Performed by: FAMILY MEDICINE

## 2019-05-21 PROCEDURE — 85025 COMPLETE CBC W/AUTO DIFF WBC: CPT

## 2019-05-21 RX ORDER — SIMVASTATIN 20 MG
20 TABLET ORAL NIGHTLY
Status: DISCONTINUED | OUTPATIENT
Start: 2019-05-21 | End: 2019-05-24 | Stop reason: HOSPADM

## 2019-05-21 RX ORDER — PANTOPRAZOLE SODIUM 40 MG/1
40 TABLET, DELAYED RELEASE ORAL
Status: DISCONTINUED | OUTPATIENT
Start: 2019-05-22 | End: 2019-05-24 | Stop reason: HOSPADM

## 2019-05-21 RX ORDER — ACETAMINOPHEN 325 MG/1
650 TABLET ORAL EVERY 4 HOURS PRN
Status: DISCONTINUED | OUTPATIENT
Start: 2019-05-21 | End: 2019-05-24 | Stop reason: HOSPADM

## 2019-05-21 RX ORDER — SODIUM CHLORIDE 0.9 % (FLUSH) 0.9 %
10 SYRINGE (ML) INJECTION EVERY 12 HOURS SCHEDULED
Status: DISCONTINUED | OUTPATIENT
Start: 2019-05-21 | End: 2019-05-24 | Stop reason: HOSPADM

## 2019-05-21 RX ORDER — MORPHINE SULFATE 4 MG/ML
4 INJECTION, SOLUTION INTRAMUSCULAR; INTRAVENOUS
Status: DISCONTINUED | OUTPATIENT
Start: 2019-05-21 | End: 2019-05-24 | Stop reason: HOSPADM

## 2019-05-21 RX ORDER — M-VIT,TX,IRON,MINS/CALC/FOLIC 27MG-0.4MG
1 TABLET ORAL DAILY
Status: DISCONTINUED | OUTPATIENT
Start: 2019-05-22 | End: 2019-05-24 | Stop reason: HOSPADM

## 2019-05-21 RX ORDER — HYDROCODONE BITARTRATE AND ACETAMINOPHEN 5; 325 MG/1; MG/1
1 TABLET ORAL EVERY 4 HOURS PRN
Status: DISCONTINUED | OUTPATIENT
Start: 2019-05-21 | End: 2019-05-24 | Stop reason: HOSPADM

## 2019-05-21 RX ORDER — ALPRAZOLAM 0.5 MG/1
0.5 TABLET ORAL NIGHTLY PRN
Status: DISCONTINUED | OUTPATIENT
Start: 2019-05-22 | End: 2019-05-22

## 2019-05-21 RX ORDER — ONDANSETRON 2 MG/ML
4 INJECTION INTRAMUSCULAR; INTRAVENOUS EVERY 6 HOURS PRN
Status: DISCONTINUED | OUTPATIENT
Start: 2019-05-21 | End: 2019-05-24 | Stop reason: HOSPADM

## 2019-05-21 RX ORDER — PREDNISONE 1 MG/1
2 TABLET ORAL DAILY
Status: DISCONTINUED | OUTPATIENT
Start: 2019-05-22 | End: 2019-05-24 | Stop reason: HOSPADM

## 2019-05-21 RX ORDER — SODIUM CHLORIDE 0.9 % (FLUSH) 0.9 %
10 SYRINGE (ML) INJECTION PRN
Status: DISCONTINUED | OUTPATIENT
Start: 2019-05-21 | End: 2019-05-24 | Stop reason: HOSPADM

## 2019-05-21 RX ORDER — HYDROCODONE BITARTRATE AND ACETAMINOPHEN 5; 325 MG/1; MG/1
1 TABLET ORAL ONCE
Status: COMPLETED | OUTPATIENT
Start: 2019-05-21 | End: 2019-05-21

## 2019-05-21 RX ORDER — CETIRIZINE HYDROCHLORIDE 5 MG/1
5 TABLET ORAL DAILY
Status: DISCONTINUED | OUTPATIENT
Start: 2019-05-22 | End: 2019-05-24 | Stop reason: HOSPADM

## 2019-05-21 RX ORDER — MORPHINE SULFATE 2 MG/ML
2 INJECTION, SOLUTION INTRAMUSCULAR; INTRAVENOUS
Status: DISCONTINUED | OUTPATIENT
Start: 2019-05-21 | End: 2019-05-24 | Stop reason: HOSPADM

## 2019-05-21 RX ORDER — MORPHINE SULFATE 2 MG/ML
2 INJECTION, SOLUTION INTRAMUSCULAR; INTRAVENOUS ONCE
Status: COMPLETED | OUTPATIENT
Start: 2019-05-21 | End: 2019-05-21

## 2019-05-21 RX ORDER — LEVOTHYROXINE SODIUM 88 UG/1
88 TABLET ORAL DAILY
Status: DISCONTINUED | OUTPATIENT
Start: 2019-05-22 | End: 2019-05-24 | Stop reason: HOSPADM

## 2019-05-21 RX ORDER — ISOSORBIDE MONONITRATE 30 MG/1
30 TABLET, EXTENDED RELEASE ORAL DAILY
Status: DISCONTINUED | OUTPATIENT
Start: 2019-05-22 | End: 2019-05-24 | Stop reason: HOSPADM

## 2019-05-21 RX ORDER — ALBUTEROL SULFATE 2.5 MG/3ML
2.5 SOLUTION RESPIRATORY (INHALATION) EVERY 6 HOURS PRN
Status: DISCONTINUED | OUTPATIENT
Start: 2019-05-21 | End: 2019-05-24 | Stop reason: HOSPADM

## 2019-05-21 RX ORDER — HYDROCODONE BITARTRATE AND ACETAMINOPHEN 5; 325 MG/1; MG/1
1 TABLET ORAL ONCE
Status: DISCONTINUED | OUTPATIENT
Start: 2019-05-21 | End: 2019-05-21

## 2019-05-21 RX ORDER — CLOPIDOGREL BISULFATE 75 MG/1
75 TABLET ORAL DAILY
Status: DISCONTINUED | OUTPATIENT
Start: 2019-05-23 | End: 2019-05-24 | Stop reason: HOSPADM

## 2019-05-21 RX ORDER — HYDROCODONE BITARTRATE AND ACETAMINOPHEN 5; 325 MG/1; MG/1
2 TABLET ORAL EVERY 4 HOURS PRN
Status: DISCONTINUED | OUTPATIENT
Start: 2019-05-21 | End: 2019-05-24 | Stop reason: HOSPADM

## 2019-05-21 RX ADMIN — MORPHINE SULFATE 2 MG: 2 INJECTION, SOLUTION INTRAMUSCULAR; INTRAVENOUS at 18:12

## 2019-05-21 RX ADMIN — HYDROCODONE BITARTRATE AND ACETAMINOPHEN 1 TABLET: 5; 325 TABLET ORAL at 20:30

## 2019-05-21 RX ADMIN — MORPHINE SULFATE 4 MG: 4 INJECTION INTRAVENOUS at 22:29

## 2019-05-21 ASSESSMENT — PAIN SCALES - GENERAL
PAINLEVEL_OUTOF10: 10

## 2019-05-21 ASSESSMENT — ENCOUNTER SYMPTOMS
WHEEZING: 0
BACK PAIN: 1
DIARRHEA: 0
SHORTNESS OF BREATH: 0
EYE DISCHARGE: 0
NAUSEA: 0
EYE PAIN: 0
SORE THROAT: 0
RHINORRHEA: 0
COUGH: 0
ABDOMINAL PAIN: 0
VOMITING: 0

## 2019-05-21 ASSESSMENT — PAIN DESCRIPTION - DESCRIPTORS: DESCRIPTORS: ACHING

## 2019-05-21 ASSESSMENT — PAIN DESCRIPTION - LOCATION: LOCATION: HIP;ARM

## 2019-05-21 ASSESSMENT — PAIN DESCRIPTION - ONSET: ONSET: ON-GOING

## 2019-05-21 ASSESSMENT — PAIN DESCRIPTION - ORIENTATION: ORIENTATION: RIGHT

## 2019-05-21 NOTE — ED NOTES
Bed: 005A  Expected date: 5/21/19  Expected time: 5:20 PM  Means of arrival: [facility]  Comments:  ALEXANDRO Mckinney RN  05/21/19 7538

## 2019-05-21 NOTE — ED TRIAGE NOTES
Pt presents to ER via squad with c/o fall. Per reports pt lives home alone, s/p fall pt did not loose consciousness, did not hit head during or after fall. Pt states she had just returned home from chemotherapy and started feeling dizzy upon entering house and fell and went down the floor on right hip. Pt reports taking plavix as blood thinner, h/o cardiac stents placed in. Pt uses 2 LPM O@ via nc at home. Pt's daughter reports \"my mother usually gets dizzy s/p chemotherapy sessions. Right Pedal and redial pulse presence noted. Pt's RLE shortened and internally rotated. EKG obtained, pt placed on heart monitor. Call light within reach.

## 2019-05-21 NOTE — ED PROVIDER NOTES
[facility]  eMERGENCY dEPARTMENT eNCOUnter          CHIEF COMPLAINT       Chief Complaint   Patient presents with    Fall       Nurses Notes reviewed and I agree except as noted in the HPI. HISTORY OF PRESENT ILLNESS    Shiraz Zuniga is a 80 y.o. female who presents to the Emergency Department via EMS from home for the evaluation of fall prior to arrival. Patient states she returned home from chemotherapy and began feeling dizzy when she was walking to the door. Prior to entering the house she lost balance and fell onto her right hip. No head injury or loss of consciousness. Patient is on Plavix. She reports right hip and arm pain as well as low back pain. She has not taken any medication since the injury. Patient denies numbness, tingling, weakness, light-headedness, vision changes, shortness of breath, or chest pain. No further complaints at initial evaluation. The HPI was provided by the patient. REVIEW OF SYSTEMS     Review of Systems   Constitutional: Negative for appetite change, chills, fatigue and fever. HENT: Negative for congestion, ear pain, rhinorrhea and sore throat. Eyes: Negative for pain, discharge and visual disturbance. Respiratory: Negative for cough, shortness of breath and wheezing. Cardiovascular: Negative for chest pain, palpitations and leg swelling. Gastrointestinal: Negative for abdominal pain, diarrhea, nausea and vomiting. Genitourinary: Negative for difficulty urinating, dysuria, hematuria and vaginal discharge. Musculoskeletal: Positive for arthralgias (right hip and arm), back pain and myalgias. Negative for joint swelling and neck pain. Skin: Negative for pallor and rash. Neurological: Negative for dizziness, syncope, weakness, light-headedness, numbness and headaches. Hematological: Negative for adenopathy. Psychiatric/Behavioral: Negative for confusion and suicidal ideas. The patient is not nervous/anxious.         PAST MEDICALHISTORY    has a past medical history of Anxiety, Blind right eye, Breast cancer metastasized to liver [facility]), Carotid stenosis, Colostomy in place [facility]), Degenerative arthritis of cervical spine, GERD (gastroesophageal reflux disease), Hypercholesteremia, Hypoestrogenism, Hypothyroidism, Lumbago, Lumbosacral spinal stenosis, MDRO (multiple drug resistant organisms) resistance, Personal history of cardiac dysrhythmia, Sigmoid diverticulosis, Spondylolisthesis of lumbosacral region, Steroid-induced hyperglycemia, Subclavian artery stenosis ([address] Superior and Inferior Pubic ramus fracture, right, closed, initial encounter ([address] SVT (supraventricular tachycardia) ([address] Temporal arteritis ([address] Vertebral artery occlusion, and Vitamin D deficiency. SURGICAL HISTORY    has a past surgical history that includes Dilation and curettage of uterus; Cholecystectomy (1/03); Cardiac catheterization (5/07); Cataract removal (right 1/08; left 2/08); subclavian / pulmonary shunt (2002); Colonoscopy (11/06); Upper gastrointestinal endoscopy (11/06); Hysterectomy; eye surgery; other surgical history (05/27/2016); Eye surgery (Right, 11/06/2017); pr office/outpt visit,procedure only (N/A, 9/4/2018); colostomy (09/04/2018); and Endoscopy, colon, diagnostic.     CURRENT MEDICATIONS       Discharge Medication List as of 5/24/2019 10:35 AM      CONTINUE these medications which have NOT CHANGED    Details   metoprolol tartrate (LOPRESSOR) 25 MG tablet take 1/2 tablet by mouth twice a day, Disp-90 tablet, R-1Normal      OYSTER SHELL CALCIUM + D3 500-400 MG-UNIT TABS take 1 tablet by mouth twice a day, Disp-100 tablet, R-3Normal      sertraline (ZOLOFT) 50 MG tablet take 1 tablet by mouth twice a day, Disp-180 tablet, R-1Normal      isosorbide mononitrate (IMDUR) 30 MG extended release tablet take 1 tablet by mouth once daily, Disp-45 tablet, R-3Normal      levothyroxine (SYNTHROID) 88 MCG tablet take 1 tablet by mouth once daily, Disp-90 tablet, R-1Normal      ALPRAZolam (XANAX) 0.5 MG tablet Take 0.5 mg by mouth nightly as needed for Sleep. Елена Tomlinson Historical Med      simvastatin (ZOCOR) 20 MG tablet take 1 tablet by mouth at bedtime, Disp-90 tablet, R-1Normal      clopidogrel (PLAVIX) 75 MG tablet take 1 tablet by mouth once daily, Disp-90 tablet, R-1Normal      loratadine (RA LORATADINE) 10 MG tablet take 1 tablet by mouth once daily, Disp-90 tablet, R-1Normal      ondansetron (ZOFRAN ODT) 4 MG disintegrating tablet Take 1 tablet by mouth every 8 hours as needed for Nausea, Disp-20 tablet, R-0Print      Oxygen Concentrator CONTINUOUS, Historical Med      Elastic Bandages & Supports (TRUFORM ARM SLEEVE L 15-20MMHG) MISC DAILY Starting Thu 7/26/2018, Disp-2 each, R-0, Print      pantoprazole (PROTONIX) 40 MG tablet take 1 tablet by mouth once daily, Disp-30 tablet, R-5Normal      glucose blood VI test strips (ONETOUCH VERIO) strip Disp-100 each, R-3, NormalUse to check blood sugar once daily.  E11.9      aspirin (RA ASPIRIN ADULT LOW STRENGTH) 81 MG chewable tablet Take 1 tablet by mouth daily, Disp-100 tablet, R-3      ONE TOUCH LANCETS MISC DAILY Starting 4/27/2016, Until Discontinued, Disp-100 each, R-1, Normal      albuterol (PROVENTIL) (2.5 MG/3ML) 0.083% nebulizer solution Take 3 mLs by nebulization every 6 hours as needed for Wheezing, Disp-30 vial, R-1      Nebulizers (NEBULIZER COMPRESSOR) MISC Disp-1 each, R-0, PrintAlbuterol 1 unit dose every 6 hours for wheezing and chest congestion      predniSONE (DELTASONE) 1 MG tablet Take 2 mg by mouth daily Historical Med      acetaminophen (TYLENOL) 500 MG tablet Take 1,000 mg by mouth as needed for Pain      Multiple Vitamins-Minerals (CENTRUM SILVER ADULT 50+) TABS Take 1 tablet by mouth daily, Disp-90 tablet, R-1      Lift Chair MISC Starting 5/28/2015, Until Discontinued, Disp-1 each, R-0, Print             ALLERGIES     is allergic to bactrim [sulfamethoxazole-trimethoprim]; demerol; and pcn sounds. She has no wheezes. She has no rhonchi. She has no rales. Abdominal: Soft. Bowel sounds are normal. She exhibits no distension. There is no tenderness. There is no rebound, no guarding and no CVA tenderness. Musculoskeletal: Normal range of motion. She exhibits no edema. Right hip: She exhibits tenderness. Tenderness at the right hip. Right leg is shortened and internally rotated. Neurological: She is alert and oriented to person, place, and time. She exhibits normal muscle tone. Coordination normal.   Skin: Skin is warm and dry. No rash noted. She is not diaphoretic. Nursing note and vitals reviewed. DIFFERENTIALDIAGNOSIS:   fracture, spasm, sprain, straining, crush injury, nerve pain, vascular compromise, compartment syndrome, and others     DIAGNOSTIC RESULTS     EKG: All EKG's are interpreted by the Emergency Department Physician who either signs or Co-signs this chart in the absence of a cardiologist.  EKG interpreted by Ken Bullard MD:    Vent. Rate: 70 bpm  NH interval: 168 ms  QRS duration: 88 ms  QTc: 453 ms  P-R-T axes: 70, 0, 116  Sinus rhythm with premature atrial complexes. No STEMI. RADIOLOGY: non-plain film images(s) such as CT, Ultrasound and MRI are read by the radiologist.    CT PELVIS WO CONTRAST Additional Contrast? None   Final Result   No proximal right femur fracture though there are comminuted superior and inferior pubic rami fractures on the right. Final report electronically signed by Dr. Daya Cueto on 5/22/2019 2:01 PM      XR CHEST 1 VW   Final Result      No acute findings. **This report has been created using voice recognition software. It may contain minor errors which are inherent in voice recognition technology. **      Final report electronically signed by Dr. Joy Silva on 5/21/2019 7:50 PM      XR LUMBAR SPINE (2-3 VIEWS)   Final Result   Advanced degenerative changes lumbar spine with anterolisthesis L5 on S1 which RDW-CV 17.2 (*)     RDW-SD 58.4 (*)     Platelets 802 (*)     All other components within normal limits   CBC WITH AUTO DIFFERENTIAL - Abnormal; Notable for the following components:    RBC 3.59 (*)     Hemoglobin 9.8 (*)     Hematocrit 33.6 (*)     MCHC 29.2 (*)     RDW-CV 16.8 (*)     RDW-SD 57.5 (*)     Platelets 558 (*)     All other components within normal limits   PROTIME-INR   APTT   ANION GAP   OSMOLALITY   POCT GLUCOSE   POCT GLUCOSE       EMERGENCY DEPARTMENT COURSE:   Vitals:    Vitals:    05/23/19 1345 05/23/19 1950 05/24/19 0328 05/24/19 0819   BP: (!) 96/50 (!) 97/53 (!) 115/58 (!) 145/63   Pulse: 77 72 61 71   Resp: 18 18 18 18   Temp: 99.8 °F (37.7 °C) 99.4 °F (37.4 °C) 97.9 °F (36.6 °C) 97.8 °F (36.6 °C)   TempSrc: Oral Oral Oral Oral   SpO2: 91% 93% 93%    Weight:       Height:           6:15 PM: The patient was seen and evaluated in a timely fashion. MDM:  The pt was seen and evaluated by me. Within the department, I observed the pt's vital signs to be within acceptable range. Laboratory and Radiological studies were performed, results were reviewed with the patient and family at bedside. X-ray showed a superior inferior pubic cream or fractures on the right, CT confirmed a minimally displaced extraforaminal fractures. Orthopedics to admit for further evaluation and management. I observed the pt's condition to remain stable during the duration of their stay. I explained my proposed course of treatment to the pt, and they were amenable to my decision. Admitted under orthopedic service. Recommendations for minimally displaced pelvic fracture weightbearing as tolerated with walker, physical therapy occupational therapy,    skilled nursing facility placement. CRITICAL CARE:   None     CONSULTS:  Orthopedics - Dr Doris Blanco:  None     FINAL IMPRESSION      1.  Closed fracture of right pelvis, initial encounter [facility])          DISPOSITION/PLAN   ADMIT    PATIENT REFERRED TO:  [place] Carreon [address], [address] [facility] [address]  [phone]          [address]  [facility] [address]  [phone]          DISCHARGE MEDICATIONS:  Discharge Medication List as of 5/24/2019 10:35 AM          (Please note that portions of this note were completed with a voice recognition program.Efforts were made to edit thedictations but occasionally words are mis-transcribed.)    Scribe:  Daniel Hurtado 5/21/19 6:15 PM Scribing forand in the presence Rosales Diaz MD.    Signed by: Leydi Rios, 05/24/19 5:46 PM    Provider:  I personally performed the services described in the documentation, reviewed and edited the documentation which was dictated to the scribe in my presence, and it accurately records mywords and actions.     Chadwick Luis MD 5/21/19 5:46 PM                  Chadwick Luis MD  05/24/19 0762

## 2019-05-22 ENCOUNTER — APPOINTMENT (OUTPATIENT)
Dept: CT IMAGING | Age: 84
DRG: 536 | End: 2019-05-22
Payer: MEDICARE

## 2019-05-22 PROBLEM — K43.5 PARASTOMAL HERNIA WITHOUT OBSTRUCTION OR GANGRENE: Status: RESOLVED | Noted: 2018-09-04 | Resolved: 2019-05-22

## 2019-05-22 PROBLEM — K56.600 PARTIAL SMALL BOWEL OBSTRUCTION (HCC): Status: RESOLVED | Noted: 2018-12-23 | Resolved: 2019-05-22

## 2019-05-22 PROBLEM — R53.1 WEAKNESS GENERALIZED: Status: RESOLVED | Noted: 2018-09-10 | Resolved: 2019-05-22

## 2019-05-22 PROBLEM — N17.9 AKI (ACUTE KIDNEY INJURY) (HCC): Status: RESOLVED | Noted: 2018-12-27 | Resolved: 2019-05-22

## 2019-05-22 PROCEDURE — 72192 CT PELVIS W/O DYE: CPT

## 2019-05-22 PROCEDURE — 97162 PT EVAL MOD COMPLEX 30 MIN: CPT

## 2019-05-22 PROCEDURE — 97530 THERAPEUTIC ACTIVITIES: CPT

## 2019-05-22 PROCEDURE — 6820000001 HC L2 TRAUMA SURGERY EVALUATION

## 2019-05-22 PROCEDURE — 6370000000 HC RX 637 (ALT 250 FOR IP): Performed by: PHYSICIAN ASSISTANT

## 2019-05-22 PROCEDURE — 6360000002 HC RX W HCPCS: Performed by: PHYSICIAN ASSISTANT

## 2019-05-22 PROCEDURE — 97110 THERAPEUTIC EXERCISES: CPT

## 2019-05-22 PROCEDURE — 1200000000 HC SEMI PRIVATE

## 2019-05-22 PROCEDURE — 2580000003 HC RX 258: Performed by: PHYSICIAN ASSISTANT

## 2019-05-22 RX ORDER — ALPRAZOLAM 0.5 MG/1
0.5 TABLET ORAL 3 TIMES DAILY PRN
Status: DISCONTINUED | OUTPATIENT
Start: 2019-05-22 | End: 2019-05-24 | Stop reason: HOSPADM

## 2019-05-22 RX ADMIN — LEVOTHYROXINE SODIUM 88 MCG: 88 TABLET ORAL at 08:53

## 2019-05-22 RX ADMIN — HYDROCODONE BITARTRATE AND ACETAMINOPHEN 1 TABLET: 5; 325 TABLET ORAL at 08:56

## 2019-05-22 RX ADMIN — SERTRALINE 50 MG: 50 TABLET, FILM COATED ORAL at 08:53

## 2019-05-22 RX ADMIN — HYDROCODONE BITARTRATE AND ACETAMINOPHEN 1 TABLET: 5; 325 TABLET ORAL at 15:47

## 2019-05-22 RX ADMIN — MORPHINE SULFATE 4 MG: 4 INJECTION INTRAVENOUS at 22:56

## 2019-05-22 RX ADMIN — ASPIRIN 325 MG: 325 TABLET, DELAYED RELEASE ORAL at 08:53

## 2019-05-22 RX ADMIN — Medication 10 ML: at 19:39

## 2019-05-22 RX ADMIN — SIMVASTATIN 20 MG: 20 TABLET, FILM COATED ORAL at 19:39

## 2019-05-22 RX ADMIN — METOPROLOL TARTRATE 12.5 MG: 25 TABLET ORAL at 08:53

## 2019-05-22 RX ADMIN — METOPROLOL TARTRATE 12.5 MG: 25 TABLET ORAL at 19:39

## 2019-05-22 RX ADMIN — ISOSORBIDE MONONITRATE 30 MG: 30 TABLET ORAL at 08:53

## 2019-05-22 RX ADMIN — PREDNISONE 2 MG: 1 TABLET ORAL at 08:53

## 2019-05-22 RX ADMIN — CETIRIZINE HYDROCHLORIDE 5 MG: 5 TABLET ORAL at 08:53

## 2019-05-22 RX ADMIN — HYDROCODONE BITARTRATE AND ACETAMINOPHEN 2 TABLET: 5; 325 TABLET ORAL at 19:39

## 2019-05-22 RX ADMIN — PANTOPRAZOLE SODIUM 40 MG: 40 TABLET, DELAYED RELEASE ORAL at 08:53

## 2019-05-22 RX ADMIN — SERTRALINE 50 MG: 50 TABLET, FILM COATED ORAL at 19:39

## 2019-05-22 RX ADMIN — Medication 10 ML: at 02:40

## 2019-05-22 RX ADMIN — Medication 10 ML: at 08:57

## 2019-05-22 RX ADMIN — MORPHINE SULFATE 4 MG: 4 INJECTION INTRAVENOUS at 01:35

## 2019-05-22 RX ADMIN — MULTIPLE VITAMINS W/ MINERALS TAB 1 TABLET: TAB at 08:53

## 2019-05-22 ASSESSMENT — PAIN DESCRIPTION - FREQUENCY
FREQUENCY: CONTINUOUS
FREQUENCY: CONTINUOUS

## 2019-05-22 ASSESSMENT — PAIN SCALES - GENERAL
PAINLEVEL_OUTOF10: 4
PAINLEVEL_OUTOF10: 9
PAINLEVEL_OUTOF10: 6
PAINLEVEL_OUTOF10: 10
PAINLEVEL_OUTOF10: 9
PAINLEVEL_OUTOF10: 6

## 2019-05-22 ASSESSMENT — PAIN DESCRIPTION - PROGRESSION
CLINICAL_PROGRESSION: GRADUALLY WORSENING
CLINICAL_PROGRESSION: GRADUALLY WORSENING

## 2019-05-22 ASSESSMENT — PAIN - FUNCTIONAL ASSESSMENT
PAIN_FUNCTIONAL_ASSESSMENT: PREVENTS OR INTERFERES SOME ACTIVE ACTIVITIES AND ADLS
PAIN_FUNCTIONAL_ASSESSMENT: PREVENTS OR INTERFERES SOME ACTIVE ACTIVITIES AND ADLS

## 2019-05-22 ASSESSMENT — PAIN DESCRIPTION - DESCRIPTORS
DESCRIPTORS: ACHING
DESCRIPTORS: ACHING

## 2019-05-22 ASSESSMENT — PAIN DESCRIPTION - LOCATION
LOCATION: HIP
LOCATION: HIP

## 2019-05-22 ASSESSMENT — PAIN DESCRIPTION - PAIN TYPE
TYPE: ACUTE PAIN
TYPE: ACUTE PAIN

## 2019-05-22 ASSESSMENT — PAIN DESCRIPTION - ORIENTATION
ORIENTATION: RIGHT
ORIENTATION: RIGHT

## 2019-05-22 ASSESSMENT — PAIN DESCRIPTION - ONSET
ONSET: ON-GOING
ONSET: ON-GOING

## 2019-05-22 NOTE — PROGRESS NOTES
Pt arrived in [address] [place] [address] from ED and via cart/stretcher. Complaints: right hip pain.     IV none infusing into the antecubital right, condition patent and no redness

## 2019-05-22 NOTE — ED NOTES
Pt. Resting in bed with even and unlabored respirations. Pt. States pain is a 10/10 at this time. Pt was medicated and repositioned for comfort. Pt. Updated about plan of care and treatment. Family at bedside. Pt. Has no further concerns, questions or needs at this time. Call light within reach.         January Duncan RN  05/21/19 2702

## 2019-05-22 NOTE — PROGRESS NOTES
Family Medicine Progress Notes/Coverage    Today's Date: 5/22/19  [id number]  Medical Record # [id number]  Account # [de-identified]      Ms. Rod admitted on 5/21/2019        Subjective / Interval History :     Admitted by Ortho due to fall yesterday. She returned home yesterday from chemotherapy and began feeling dizzy when she was walking to the door. Prior to entering the house she lost balance and fell onto her right hip. No head injury or loss of consciousness. Patient is on Plavix. She reports right hip and arm pain as well as low back pain    Reviewed notes, consults, laboratory and radiology results,    Objective:       Physical Exam:  Patient Vitals for the past 24 hrs:   BP Temp Temp src Pulse Resp SpO2 Height Weight   05/22/19 0849 (!) 116/53 98 °F (36.7 °C) Oral 68 16 94 % -- --   05/22/19 0251 (!) 117/55 -- -- 68 18 94 % -- --   05/21/19 2205 (!) 181/77 98.2 °F (36.8 °C) Oral 69 17 96 % -- --   05/21/19 2043 (!) 153/65 -- -- 68 24 97 % -- --   05/21/19 1820 (!) 131/113 -- -- 69 19 94 % -- --   05/21/19 1740 124/89 98.5 °F (36.9 °C) Oral 70 20 92 % 5' 2\" (1.575 m) 151 lb (68.5 kg)       General Appearance:  Alert, cooperative, no distress, appears stated age   HEENT:  Neck: WNL  supple   Chest/Lungs:  Heart: CTA  RRR   Abdomen:  Back: Soft non tender  No CVA terderness   Extremities:  Neurological Exam: No edema  WNL       Assessment:     Admitting Diagnosis:    Fracture of right pelvis, closed, initial encounter ([facility] [address] [S32. [address] Problems    Diagnosis Date Noted    Fracture of right pelvis, closed, initial encounter ([facility] [address] [S32. 9XXA] 05/21/2019     Priority: High    Anxiety [F41.9] 06/29/2016     Priority: High    Breast cancer metastasized to liver ([facility] [address] [C50.919, C78.7] 06/08/2016     Priority: High    Personal history of cardiac dysrhythmia [Z86.79]      Priority: Medium    Current chronic use of systemic steroids [Z79.52] 09/04/2018     Priority: Medium    H/O: CVA (cerebrovascular accident) [Z86.73] 11/06/2017     Priority: Medium    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]      Priority: Medium    Steroid-induced hyperglycemia [R73.9, T38.0X5A] 04/19/2016     Priority: Medium    Hypothyroidism due to acquired atrophy of thyroid [E03.4] 03/03/2016     Priority: Medium    Temporal arteritis (HCC) [M31.6]      Priority: Medium    GERD (gastroesophageal reflux disease) [K21.9]      Priority: Medium       Plan:     Discussed plans with the nursing staff  Resume home medications  PT/OT if CT do not show other injurues  Discussed ECF placement.     Medications, Laboratories and Imaging results:    Scheduled Meds:   enoxaparin  30 mg Subcutaneous Q24H    sodium chloride flush  10 mL Intravenous 2 times per day    [START ON 5/23/2019] clopidogrel  75 mg Oral Daily    isosorbide mononitrate  30 mg Oral Daily    levothyroxine  88 mcg Oral Daily    cetirizine  5 mg Oral Daily    metoprolol tartrate  12.5 mg Oral BID    therapeutic multivitamin-minerals  1 tablet Oral Daily    pantoprazole  40 mg Oral QAM AC    predniSONE  2 mg Oral Daily    sertraline  50 mg Oral BID    simvastatin  20 mg Oral Nightly    aspirin  325 mg Oral Daily     Continuous Infusions:  PRN Meds:ALPRAZolam, sodium chloride flush, acetaminophen, magnesium hydroxide, ondansetron, HYDROcodone 5 mg - acetaminophen **OR** HYDROcodone 5 mg - acetaminophen, morphine **OR** morphine, albuterol    Imaging:    Lab Review :    Lab Results   Component Value Date    WBC 9.1 05/21/2019    HGB 11.5 (L) 05/21/2019    HCT 37.9 05/21/2019    MCV 91.1 05/21/2019     05/21/2019     Lab Results   Component Value Date    CREATININE 0.7 05/21/2019    BUN 17 05/21/2019     05/21/2019    K 4.6 05/21/2019     05/21/2019    CO2 20 (L) 05/21/2019     Lab Results   Component Value Date    CKTOTAL 41 10/16/2016     Lab Results   Component Value Date    ALT 15 12/22/2018    AST 14 12/22/2018    ALKPHOS 53 12/22/2018    BILITOT 0.6 12/22/2018     Lab Results   Component Value Date    LIPASE 11.4 12/22/2018         Electronically signed by Rayna Ireland MD on 5/22/19 at 10:25 Yobany Zeng M.D.

## 2019-05-22 NOTE — H&P
I have independently seen and evaluated this patient and discussed the plan of care with the nurse practioner/physician assistant. I agree with the above note as documented by the except as stated below. Shima Perez is a 80 y.o. female who presented after fall after a chemotherapy treatment. Upon presentation complained of right sided hip pains. She denies numbness/tingling. She denies pains elsewhere. XR R Hip/Pelvis - superior and inferior pubic rami fractures, right  CT Pelvis - LC I pelvis fracture with superior and inferior pubic rami and lateral sacrum, minimally displaced, extraforaminal    Impression and Plan:  Minimally displaced right LC1 fracture of pelvis ring  - WBAT with walker  - PT/OT  - Patient will likely require SNF at discharge  - Lovenox during admission for DVT prophylaxis  - Lovenox x 2 weeks, then resume ASA and plavix at discharge for DVT prophylaxis    History and Physical        CHIEF COMPLAINT:  Right hip pain    HISTORY OF PRESENT ILLNESS:      The patient is a 80 y.o. female  With multiple medical comorbidities who was admitted to ortho services for treatment of a right pelvic fracture. Patient has a history of breast CA with mets to the liver and is currently undergoing weekly chemotherapy. She completed a treatment yesterday and notes that she usually gets dizzy afterwards. She reports a fall in her home after an episode of dizziness yesterday. She fell onto her right side landing on the right hip. She was unable to bear weight afterwards. She denies hitting her head and no LOC. X-ray shows a right superior and inferior pubic rami fracture. Patient lives alone and is typically independent. She does ambulate with a walker.      Past Medical History:    Past Medical History:   Diagnosis Date    Anxiety     Blind right eye     Breast cancer metastasized to liver ([facility] [address] 05/10/2016    Liver lesion noted     Carotid stenosis      Left ICA 25%    Colostomy in place [facility]) 05/27/2016    Degenerative arthritis of cervical spine 5/07    GERD (gastroesophageal reflux disease) 11/06    Hypercholesteremia     Hypoestrogenism     Hypothyroidism     Lumbago     MDRO (multiple drug resistant organisms) resistance 2008    pt stated cleared    Personal history of cardiac dysrhythmia     Sigmoid diverticulosis 8/04    Spondylolisthesis of lumbosacral region 8/04    Steroid-induced hyperglycemia     Subclavian artery stenosis (HCC)     left    SVT (supraventricular tachycardia) ([address] 6/07    Temporal arteritis ([address]     Vertebral artery occlusion     left    Vitamin D deficiency 06/2017       Past Surgical History:    Past Surgical History:   Procedure Laterality Date    CARDIAC CATHETERIZATION  5/07    CATARACT REMOVAL  right 1/08; left 2/08    CHOLECYSTECTOMY  1/03    COLONOSCOPY  11/06    COLOSTOMY  09/04/2018    REVERSAL OF COLOSTOMY    DILATION AND CURETTAGE OF UTERUS      ENDOSCOPY, COLON, DIAGNOSTIC      EYE SURGERY      [facility]06[facility] [address]      partial    OTHER SURGICAL HISTORY  05/27/2016    robotic assisted laparoscopic anterior colon resection and end colostomy    VT OFFICE/OUTPT VISIT,PROCEDURE ONLY N/A 9/4/2018    COLOSTOMY REVERSAL AND PARASTOMAL HERNIA REPAIR performed by Ceci Mims MD at [address] [facility]  2002    UPPER GASTROINTESTINAL ENDOSCOPY  11/06       Medications Prior to Admission:   Prior to Admission medications    Medication Sig Start Date End Date Taking?  Authorizing Provider   metoprolol tartrate (LOPRESSOR) 25 MG tablet take 1/2 tablet by mouth twice a day 4/29/19   Loco Chahal MD   OYSTER SHELL CALCIUM + D3 500-400 MG-UNIT TABS take 1 tablet by mouth twice a day 4/1/19   Loco Chahal MD   sertraline (ZOLOFT) 50 MG tablet take 1 tablet by mouth twice a day 3/13/19   Loco Chahal MD   isosorbide mononitrate (IMDUR) 30 MG extended release tablet take 1 tablet by mouth once daily 3/11/19   Saundra Diaz MD   levothyroxine (SYNTHROID) 88 MCG tablet take 1 tablet by mouth once daily 2/19/19   Saundra Diaz MD   ALPRAZolam Tr[place]) 0.5 MG tablet Take 0.5 mg by mouth nightly as needed for Sleep. Irma Gaines Historical Provider, MD   simvastatin (ZOCOR) 20 MG tablet take 1 tablet by mouth at bedtime 12/16/18   Saundra Diaz MD   clopidogrel (PLAVIX) 75 MG tablet take 1 tablet by mouth once daily 12/16/18   Saundra Diaz MD   loratadine (RA LORATADINE) 10 MG tablet take 1 tablet by mouth once daily 12/7/18   Maye Jiménez MD   ondansetron (ZOFRAN ODT) 4 MG disintegrating tablet Take 1 tablet by mouth every 8 hours as needed for Nausea 9/27/18   Saundra Diaz MD   Oxygen Concentrator 1 L by Nasal route continuous     Historical Provider, MD   Elastic Bandages & Supports (TRUFORM ARM SLEEVE L 15-20MMHG) MISC 2 Units by Does not apply route daily 7/26/18   Saundra Diaz MD   pantoprazole (PROTONIX) 40 MG tablet take 1 tablet by mouth once daily 5/3/18   Saundra Diaz MD   glucose blood VI test strips (ONETOUCH VERIO) strip Use to check blood sugar once daily.  E11.9 6/29/17   Saundra Diaz MD   aspirin (RA ASPIRIN ADULT LOW STRENGTH) 81 MG chewable tablet Take 1 tablet by mouth daily 2/28/17   Saundra Diaz MD   ONE TOUCH LANCETS MISC 1 each by Does not apply route daily 4/27/16   Maye Jiménez MD   albuterol (PROVENTIL) (2.5 MG/3ML) 0.083% nebulizer solution Take 3 mLs by nebulization every 6 hours as needed for Wheezing 3/29/16   Saundra Diaz MD   Nebulizers (NEBULIZER COMPRESSOR) MISC Albuterol 1 unit dose every 6 hours for wheezing and chest congestion 3/29/16   Saundra Diaz MD   predniSONE (DELTASONE) 1 MG tablet Take 2 mg by mouth daily     Historical Provider, MD   acetaminophen (TYLENOL) 500 MG tablet Take 1,000 mg by mouth as needed for Pain    Historical Provider, MD   Multiple Vitamins-Minerals (CENTRUM SILVER ADULT 50+) TABS Take 1 tablet by mouth daily 12/7/15   Mony Kemp MD   Lift Chair MISC by Does not apply route 5/28/15   Anmol Ortiz MD    Scheduled Meds:   enoxaparin  30 mg Subcutaneous Q24H    sodium chloride flush  10 mL Intravenous 2 times per day    [START ON 5/23/2019] clopidogrel  75 mg Oral Daily    isosorbide mononitrate  30 mg Oral Daily    levothyroxine  88 mcg Oral Daily    cetirizine  5 mg Oral Daily    metoprolol tartrate  12.5 mg Oral BID    therapeutic multivitamin-minerals  1 tablet Oral Daily    pantoprazole  40 mg Oral QAM AC    predniSONE  2 mg Oral Daily    sertraline  50 mg Oral BID    simvastatin  20 mg Oral Nightly    aspirin  325 mg Oral Daily     Continuous Infusions:  PRN Meds:.sodium chloride flush, acetaminophen, magnesium hydroxide, ondansetron, HYDROcodone 5 mg - acetaminophen **OR** HYDROcodone 5 mg - acetaminophen, morphine **OR** morphine, albuterol, ALPRAZolam    Allergies:  Bactrim [sulfamethoxazole-trimethoprim]; Demerol; and Pcn [penicillins]    Social History:   Social History     Socioeconomic History    Marital status:       Spouse name: None    Number of children: None    Years of education: None    Highest education level: None   Occupational History    None   Social Needs    Financial resource strain: None    Food insecurity:     Worry: None     Inability: None    Transportation needs:     Medical: None     Non-medical: None   Tobacco Use    Smoking status: Former Smoker     Packs/day: 0.50     Types: Cigarettes    Smokeless tobacco: Never Used    Tobacco comment: trying to quit   Substance and Sexual Activity    Alcohol use: No    Drug use: No    Sexual activity: Never   Lifestyle    Physical activity:     Days per week: None     Minutes per session: None    Stress: None   Relationships    Social connections:     Talks on phone: None     Gets together: None     Attends Sikh service: None     Active member of club or organization: None     Attends meetings of clubs or organizations: None     Relationship status: None    Intimate partner violence:     Fear of current or ex partner: None     Emotionally abused: None     Physically abused: None     Forced sexual activity: None   Other Topics Concern    None   Social History Narrative    None     Social History     Tobacco Use   Smoking Status Former Smoker    Packs/day: 0.50    Types: Cigarettes   Smokeless Tobacco Never Used   Tobacco Comment    trying to quit     Social History     Substance and Sexual Activity   Alcohol Use No     Social History     Substance and Sexual Activity   Drug Use No       Family History:  Family History   Problem Relation Age of Onset    Cancer Sister 61        breast    Cancer Brother 79        lung    Cancer Brother 68        colon    Cancer Son 48        lung       REVIEW OF SYSTEMS:  Constitutional: Denies any fever, chills. Derm: Denies any rash or skin color change. Eyes: Denies blurred or decreased in vision. Ent: Denies any tinnitus or vertigo. Resp: Denies any cough or shortness of breath. CV: Denies any syncope, palpitations or chest pain. GI:  Denies any abdominal pain, nausea, vomiting, constipation or diarrhea. : Denies any hematuria, hesitancy, or dysuria. Heme/Lymph: Denies any bleeding. Musculoskeletal: Positive for right hip pain  Neuro: Denies any dizziness, paresthesia or weakness. PHYSICAL EXAM:  Patient Vitals for the past 24 hrs:   BP Temp Temp src Pulse Resp SpO2 Height Weight   05/22/19 0849 (!) 116/53 98 °F (36.7 °C) Oral 68 16 94 % -- --   05/22/19 0251 (!) 117/55 -- -- 68 18 94 % -- --   05/21/19 2205 (!) 181/77 98.2 °F (36.8 °C) Oral 69 17 96 % -- --   05/21/19 2043 (!) 153/65 -- -- 68 24 97 % -- --   05/21/19 1820 (!) 131/113 -- -- 69 19 94 % -- --   05/21/19 1740 124/89 98.5 °F (36.9 °C) Oral 70 20 92 % 5' 2\" (1.575 m) 151 lb (68.5 kg)     General appearance:  Alert and oriented x 3.  No apparent distress, appears stated age and cooperative. HEENT:  Normal cephalic, atraumatic without obvious deformity. Pupils equal, round, and reactive to light. Conjunctivae/corneas clear. Neck: Supple, with full range of motion. Trachea midline. Respiratory:  Normal respiratory effort. No audible Wheezes or Rhonchi. Cardiovascular:  Regular rate and rhythm. Abdomen: Soft, non-tender, non-distended. Musculoskeletal: right hip skin intact, no rashes or lesions. TTP groin and greater troch. Flex and extends toes and ankle right. Decreased ROM secondary to pain. Calf soft non-tender. SILT. Skin: Skin color, texture, turgor normal.  No rashes or lesions. Neurologic:  Neurovascularly intact without any focal sensory/motor deficits. Sensation intact. Capillary Refill: Brisk,< 3 seconds   Peripheral Pulses: +2 palpable, equal bilaterally    DATA:  CBC:   Lab Results   Component Value Date    WBC 9.1 05/21/2019    HGB 11.5 05/21/2019     05/21/2019     BMP:    Lab Results   Component Value Date     05/21/2019    K 4.6 05/21/2019    K 4.2 12/24/2018     05/21/2019    CO2 20 05/21/2019    BUN 17 05/21/2019    CREATININE 0.7 05/21/2019    CALCIUM 8.7 05/21/2019    GLUCOSE 186 05/21/2019    GLUCOSE 103 07/23/2018     PT/INR:    Lab Results   Component Value Date    INR 0.91 05/21/2019     Troponin:  No results found for: TROPONINI  No results for input(s): LIPASE, AMYLASE in the last 72 hours. No results for input(s): AST, ALT, BILIDIR, BILITOT, ALKPHOS in the last 72 hours. Radiology:  Xr Lumbar Spine (2-3 Views)    Result Date: 5/21/2019  PROCEDURE: XR LUMBAR SPINE (2-3 VIEWS) CLINICAL INFORMATION: pain s/p fall, COMPARISON: No prior study. TECHNIQUE: AP and lateral views of the lumbar spine. FINDINGS: Mild lumbar scoliosis. Degenerative changes. Anterolisthesis of 1.5 cm L5 on S1. Endplate spurring. Disc space narrowing. Facet hypertrophy greatest at L5-S1. No definite acute fracture. Vascular calcifications with lower abdominal stent graft. Surgical clips in the right upper quadrant. Stool in the visualized colon. Narrowing of the SI joints. Advanced degenerative changes lumbar spine with anterolisthesis L5 on S1 which appears to have increased when compared to most recent prior. Correlation with symptoms is advised. No definite acute fracture. **This report has been created using voice recognition software. It may contain minor errors which are inherent in voice recognition technology. ** Final report electronically signed by Dr. Becka Brewer on 5/21/2019 7:37 PM    Xr Humerus Right (min 2 Views)    Result Date: 5/21/2019  PROCEDURE: XR HUMERUS RIGHT (MIN 2 VIEWS) CLINICAL INFORMATION: pain s/p fall . COMPARISON: No prior study. TECHNIQUE: AP and lateral views of the right humerus. FINDINGS: Degenerative changes. No acute fracture or dislocation. Narrowing of the acromioclavicular and glenohumeral joint. Soft tissues are unremarkable. No acute fracture or dislocation. **This report has been created using voice recognition software. It may contain minor errors which are inherent in voice recognition technology. ** Final report electronically signed by Dr. Becka Brewer on 5/21/2019 7:51 PM    Xr Chest 1 Vw    Result Date: 5/21/2019  PROCEDURE: XR CHEST 1 VIEW CLINICAL INFORMATION: pre op. COMPARISON: No prior study. TECHNIQUE: AP Portable chest xray FINDINGS: Eventration of the right hemidiaphragm. Mild hyperinflated lung fields. No lobar consolidation. Costophrenic recesses are preserved. Cardiac mediastinal silhouette is within normal limits. No acute findings. **This report has been created using voice recognition software. It may contain minor errors which are inherent in voice recognition technology. ** Final report electronically signed by Dr. Becka Brewer on 5/21/2019 7:50 PM    Xr Hip 2-3 Vw W Pelvis Right    Result Date: 5/21/2019  PROCEDURE: XR HIP 2-3 VW W PELVIS RIGHT CLINICAL INFORMATION: hip pain s/p fall. hip internally rotated and shortened . COMPARISON: No prior study. TECHNIQUE: 2 views of the right hip joint. Single view pelvis. FINDINGS: Generalized osteopenia. Narrowing of the hip joint. No dislocation. Acute fracture of the superior and inferior pubic ramus on the right. There is 1 cm cortical offset of the superior pubic cortical margin. Degenerative changes SI joints. Surgical clips in the pelvis. Stool in the visualized colon. 1. Acute right superior and inferior pubic ramus fracture with cortical offset as described. **This report has been created using voice recognition software. It may contain minor errors which are inherent in voice recognition technology. ** Final report electronically signed by Dr. Irwin Sethi on 5/21/2019 7:49 PM       ASSESSMENT:Active Problems:    Fracture of right pelvis, closed, initial encounter [facility])  Resolved Problems:    * No resolved hospital problems. *       PLAN as discussed with Dr. Carl Valerio:  Conservative treatment of the pelvic fracture. Will obtain CT pelvis wo contrast.  Consult Dr. Ana Salazar to manage chronic issues  Bedrest until after CT.  for home going needs.    PT/OT  Pain control    Electronically signed by PAMELA Sanchez CNP on 5/22/2019 at 9:00 AM

## 2019-05-22 NOTE — PROGRESS NOTES
[facility]  INPATIENT PHYSICAL THERAPY  EVALUATION  [facility] ORTHOPEDICS 7K - 7K-01/001-A    Time In: 1430  Time Out: 1502  Timed Code Treatment Minutes: 23 Minutes  Minutes: 32          Date: 2019  Patient Name: Nelson Hanosn,  Gender:  female        MRN: [id number]  : 1933  (80 y.o.)      Referring Practitioner: EMELYN Lara  Diagnosis: Fracture of right pelvis, closed  Additional Pertinent Hx: The patient is a 80 y.o. female  With multiple medical comorbidities who was admitted to ortho services for treatment of a right pelvic fracture. Patient has a history of breast CA with mets to the liver and is currently undergoing weekly chemotherapy. She completed a treatment yesterday and notes that she usually gets dizzy afterwards. She reports a fall in her home after an episode of dizziness yesterday. She fell onto her right side landing on the right hip. She was unable to bear weight afterwards. She denies hitting her head and no LOC. X-ray shows a right superior and inferior pubic rami fracture. Patient lives alone and is typically independent. She does ambulate with a walker. CT negative for R femur fracture.      Past Medical History:   Diagnosis Date    Anxiety     Blind right eye     Breast cancer metastasized to liver ([facility] [address] 05/10/2016    Liver lesion noted     Carotid stenosis      Left ICA 25%    Colostomy in place [facility]) 2016    Degenerative arthritis of cervical spine     GERD (gastroesophageal reflux disease)     Hypercholesteremia     Hypoestrogenism     Hypothyroidism     Lumbago     MDRO (multiple drug resistant organisms) resistance     pt stated cleared    Personal history of cardiac dysrhythmia     Sigmoid diverticulosis     Spondylolisthesis of lumbosacral region     Steroid-induced hyperglycemia     Subclavian artery stenosis (HCC)     left    SVT (supraventricular tachycardia) ([address]     Temporal arteritis ([address]     Vertebral artery occlusion     left    Vitamin D deficiency 06/2017     Past Surgical History:   Procedure Laterality Date    CARDIAC CATHETERIZATION  5/07    CATARACT REMOVAL  right 1/08; left 2/08    CHOLECYSTECTOMY  1/03    COLONOSCOPY  11/06    COLOSTOMY  09/04/2018    REVERSAL OF COLOSTOMY    DILATION AND CURETTAGE OF UTERUS      ENDOSCOPY, COLON, DIAGNOSTIC      EYE SURGERY      EYE SURGERY Right 11/06/2017    Dr Alexis Parson in [address]      partial    OTHER SURGICAL HISTORY  05/27/2016    robotic assisted laparoscopic anterior colon resection and end colostomy    DC OFFICE/OUTPT VISIT,PROCEDURE ONLY N/A 9/4/2018    COLOSTOMY REVERSAL AND PARASTOMAL HERNIA REPAIR performed by Dionna Membreno MD at [address] [facility]  2002    UPPER GASTROINTESTINAL ENDOSCOPY  11/06       Restrictions/Precautions:  Weight Bearing, Fall Risk     Right Lower Extremity Weight Bearing: Weight Bearing As Tolerated            Subjective:  Chart Reviewed: Yes  Patient assessed for rehabilitation services?: Yes  Family / Caregiver Present: No  Subjective: RN approved session, pt is agreeable. General:  Overall Orientation Status: Within Functional Limits  Follows Commands: Within Functional Limits    Vision: Within Functional Limits    Hearing: Within functional limits         Pain:   .      Pre Treatment Pain Screening  Pain at present: 8  Scale Used: Numeric Score  Intervention List: Patient able to continue with treatment  Comments / Details: R leg    Social/Functional History:    Lives With: Alone  Type of Home: House  Home Layout: One level  Home Access: Level entry  Home Equipment: Rolling walker, Oxygen(O2 at night)            Ambulation Assistance: Independent  Transfer Assistance: Independent    Active : No     Additional Comments: Pt states amb with RW, O2 at night; has aide services 5 days/week that assists with laundry, showers       Objective:       RLE AROM: WFL       LLE AROM : WFL       R LE 3+/5, L LE 4-/5         RLE Tone: Normotonic  LLE Tone: Normotonic       Balance  Standing - Static: Fair, -  Standing - Dynamic: Poor, +    Supine to Sit: Minimal assistance    Transfers  Sit to Stand: Minimal Assistance, Moderate Assistance(performs several times from EOB, cues for hand placement every trial, assist varies from min-mod A, R leg reta on one attempt causing pt to sit back on EOB)  Stand to sit: Minimal Assistance       Ambulation 1  Surface: level tile  Device: Rolling Walker  Assistance: Minimal assistance  Quality of Gait: Pt unsteady, R LE instability, cues for technique and sequencing  Gait Deviations: Slow Gillian, Decreased step length  Distance: 3 feet to chair                 Exercises:  Comments: Pt performs supine B LE AROM: ankle pumps, heel slides and hip abd/add x 10 reps to increase strength for improved gait. Activity Tolerance:  Activity Tolerance: Patient limited by endurance  Activity Tolerance: weakness    Treatment Initiated: See above exercises, additional transfers, education on recommendation for PT prior to DC home. Assessment: Body structures, Functions, Activity limitations: Decreased functional mobility , Decreased strength, Decreased safe awareness, Decreased balance, Decreased endurance, Increased Pain  Assessment: Pt tolerates session fair, limited by weakness, pain. Pt will require skilled inpatient PT prior to DC home. PT to continue to progress strength and functional mobility to return to PLOF. Prognosis: Good    Clinical Presentation: Moderate - Evolving with Changing Characteristics: Pt tolerates session fair, limited by weakness, pain. Pt will require skilled inpatient PT prior to DC home. PT to continue to progress strength and functional mobility to return to PLOF.     Decision Making: High Complexitybased on patient assessment and decision making process of determining plan of care and establishing reasonable expectations for measurable functional outcomes    REQUIRES PT FOLLOW UP: Yes    Discharge Recommendations:  Discharge Recommendations: [address]    Patient Education:  Patient Education: POC    Equipment Recommendations:  Equipment Needed: No    Safety:  Type of devices: All fall risk precautions in place, Call light within reach, Chair alarm in place, Nurse notified, Patient at risk for falls, Gait belt, Left in chair    Plan:  Times per week: 5 X O  Times per day: Daily  Current Treatment Recommendations: Strengthening, Functional Mobility Training, Transfer Training, Balance Training, Gait Training, Stair training, Home Exercise Program, Equipment Evaluation, Education, & procurement, Safety Education & Training, Patient/Caregiver Education & Training    Goals:  Patient goals : to get stronger  Short term goals  Time Frame for Short term goals: by discharge  Short term goal 1: Pt to transfer supine <--> sit SBA to enable pt to get in/out of bed. Short term goal 2: Pt to transfer sit <--> stand SBA for increased functional mobility. Short term goal 3: Pt to ambulate >15 feet for household ambulation. Long term goals  Time Frame for Long term goals : NA due to short length of stay. Evaluation Complexity: Based on the findings of patient history, examination, clinical presentation, and decision making during this evaluation, the evaluation of Latanya Rod  is of medium complexity.             AM-PAC Inpatient Mobility without Stair Climbing Raw Score : 12  AM-PAC Inpatient without Stair Climbing T-Scale Score : 37.26  Mobility Inpatient CMS 0-100% Score: 63.03  Mobility Inpatient without Stair CMS G-Code Modifier : CL

## 2019-05-22 NOTE — PLAN OF CARE
Problem: Falls - Risk of:  Goal: Will remain free from falls  Description  Will remain free from falls  Outcome: Met This Shift     Problem: Pain:  Goal: Pain level will decrease  Description  Pain level will decrease  Outcome: Ongoing  Note:   Patient reports pain medications ordered giving pain relief. Problem: Risk for Impaired Skin Integrity  Goal: Tissue integrity - skin and mucous membranes  Description  Structural intactness and normal physiological function of skin and  mucous membranes. Outcome: Ongoing  Note:   Patient tolerates turning every 2 hours to prevent skin breakdown,      Problem: Discharge Planning:  Goal: Patients continuum of care needs are met  Description  Patients continuum of care needs are met  5/22/2019 1111 by Nav Polo RN  Outcome: Ongoing  Note:    to see patient to assist with temporary placement at a SNF for PT/OT and 24 assistance with activities until safe to be home alone. 5/22/2019 1021 by MIGEL Garzon  Outcome: Ongoing     Care plan reviewed with patient. Patient verbalizes understanding of the plan of care and contributes to goal setting.

## 2019-05-23 PROBLEM — S32.591A PUBIC RAMUS FRACTURE, RIGHT, CLOSED, INITIAL ENCOUNTER (HCC): Status: ACTIVE | Noted: 2019-05-21

## 2019-05-23 PROBLEM — M48.07 LUMBOSACRAL SPINAL STENOSIS: Status: ACTIVE | Noted: 2019-05-01

## 2019-05-23 LAB
BASOPHILS # BLD: 0.2 %
BASOPHILS ABSOLUTE: 0 THOU/MM3 (ref 0–0.1)
EOSINOPHIL # BLD: 1.9 %
EOSINOPHILS ABSOLUTE: 0.2 THOU/MM3 (ref 0–0.4)
ERYTHROCYTE [DISTWIDTH] IN BLOOD BY AUTOMATED COUNT: 17.2 % (ref 11.5–14.5)
ERYTHROCYTE [DISTWIDTH] IN BLOOD BY AUTOMATED COUNT: 58.4 FL (ref 35–45)
HCT VFR BLD CALC: 34.3 % (ref 37–47)
HEMOGLOBIN: 10.1 GM/DL (ref 12–16)
IMMATURE GRANS (ABS): 0.05 THOU/MM3 (ref 0–0.07)
IMMATURE GRANULOCYTES: 0.5 %
LYMPHOCYTES # BLD: 22.8 %
LYMPHOCYTES ABSOLUTE: 2.2 THOU/MM3 (ref 1–4.8)
MCH RBC QN AUTO: 27.4 PG (ref 26–33)
MCHC RBC AUTO-ENTMCNC: 29.4 GM/DL (ref 32.2–35.5)
MCV RBC AUTO: 93 FL (ref 81–99)
MONOCYTES # BLD: 9.7 %
MONOCYTES ABSOLUTE: 0.9 THOU/MM3 (ref 0.4–1.3)
NUCLEATED RED BLOOD CELLS: 0 /100 WBC
PLATELET # BLD: 122 THOU/MM3 (ref 130–400)
PMV BLD AUTO: 12.3 FL (ref 9.4–12.4)
RBC # BLD: 3.69 MILL/MM3 (ref 4.2–5.4)
SEG NEUTROPHILS: 64.9 %
SEGMENTED NEUTROPHILS ABSOLUTE COUNT: 6.2 THOU/MM3 (ref 1.8–7.7)
WBC # BLD: 9.5 THOU/MM3 (ref 4.8–10.8)

## 2019-05-23 PROCEDURE — 97535 SELF CARE MNGMENT TRAINING: CPT

## 2019-05-23 PROCEDURE — 97530 THERAPEUTIC ACTIVITIES: CPT

## 2019-05-23 PROCEDURE — 6370000000 HC RX 637 (ALT 250 FOR IP): Performed by: PHYSICIAN ASSISTANT

## 2019-05-23 PROCEDURE — 36415 COLL VENOUS BLD VENIPUNCTURE: CPT

## 2019-05-23 PROCEDURE — 2580000003 HC RX 258: Performed by: PHYSICIAN ASSISTANT

## 2019-05-23 PROCEDURE — 97110 THERAPEUTIC EXERCISES: CPT

## 2019-05-23 PROCEDURE — 85025 COMPLETE CBC W/AUTO DIFF WBC: CPT

## 2019-05-23 PROCEDURE — 97166 OT EVAL MOD COMPLEX 45 MIN: CPT

## 2019-05-23 PROCEDURE — 6360000002 HC RX W HCPCS: Performed by: PHYSICIAN ASSISTANT

## 2019-05-23 PROCEDURE — 1200000000 HC SEMI PRIVATE

## 2019-05-23 PROCEDURE — 6360000002 HC RX W HCPCS: Performed by: NURSE PRACTITIONER

## 2019-05-23 RX ADMIN — SERTRALINE 50 MG: 50 TABLET, FILM COATED ORAL at 09:58

## 2019-05-23 RX ADMIN — PREDNISONE 2 MG: 1 TABLET ORAL at 09:58

## 2019-05-23 RX ADMIN — SIMVASTATIN 20 MG: 20 TABLET, FILM COATED ORAL at 21:07

## 2019-05-23 RX ADMIN — MULTIPLE VITAMINS W/ MINERALS TAB 1 TABLET: TAB at 09:58

## 2019-05-23 RX ADMIN — Medication 10 ML: at 09:59

## 2019-05-23 RX ADMIN — ENOXAPARIN SODIUM 30 MG: 30 INJECTION SUBCUTANEOUS at 09:58

## 2019-05-23 RX ADMIN — HYDROCODONE BITARTRATE AND ACETAMINOPHEN 2 TABLET: 5; 325 TABLET ORAL at 21:07

## 2019-05-23 RX ADMIN — HYDROCODONE BITARTRATE AND ACETAMINOPHEN 2 TABLET: 5; 325 TABLET ORAL at 02:56

## 2019-05-23 RX ADMIN — SERTRALINE 50 MG: 50 TABLET, FILM COATED ORAL at 21:07

## 2019-05-23 RX ADMIN — CETIRIZINE HYDROCHLORIDE 5 MG: 5 TABLET ORAL at 09:58

## 2019-05-23 RX ADMIN — LEVOTHYROXINE SODIUM 88 MCG: 88 TABLET ORAL at 05:44

## 2019-05-23 RX ADMIN — MAGNESIUM HYDROXIDE 30 ML: 400 SUSPENSION ORAL at 09:57

## 2019-05-23 RX ADMIN — HYDROCODONE BITARTRATE AND ACETAMINOPHEN 1 TABLET: 5; 325 TABLET ORAL at 16:35

## 2019-05-23 RX ADMIN — ASPIRIN 325 MG: 325 TABLET, DELAYED RELEASE ORAL at 09:58

## 2019-05-23 RX ADMIN — PANTOPRAZOLE SODIUM 40 MG: 40 TABLET, DELAYED RELEASE ORAL at 05:44

## 2019-05-23 RX ADMIN — ISOSORBIDE MONONITRATE 30 MG: 30 TABLET ORAL at 09:57

## 2019-05-23 RX ADMIN — CLOPIDOGREL BISULFATE 75 MG: 75 TABLET ORAL at 09:59

## 2019-05-23 RX ADMIN — MORPHINE SULFATE 4 MG: 4 INJECTION INTRAVENOUS at 05:45

## 2019-05-23 RX ADMIN — METOPROLOL TARTRATE 12.5 MG: 25 TABLET ORAL at 09:58

## 2019-05-23 RX ADMIN — Medication 10 ML: at 21:08

## 2019-05-23 ASSESSMENT — PAIN DESCRIPTION - FREQUENCY
FREQUENCY: CONTINUOUS

## 2019-05-23 ASSESSMENT — PAIN DESCRIPTION - PROGRESSION
CLINICAL_PROGRESSION: GRADUALLY WORSENING

## 2019-05-23 ASSESSMENT — PAIN SCALES - GENERAL
PAINLEVEL_OUTOF10: 9
PAINLEVEL_OUTOF10: 9
PAINLEVEL_OUTOF10: 6
PAINLEVEL_OUTOF10: 8
PAINLEVEL_OUTOF10: 9
PAINLEVEL_OUTOF10: 8
PAINLEVEL_OUTOF10: 5
PAINLEVEL_OUTOF10: 8
PAINLEVEL_OUTOF10: 8

## 2019-05-23 ASSESSMENT — PAIN DESCRIPTION - PAIN TYPE
TYPE: ACUTE PAIN

## 2019-05-23 ASSESSMENT — PAIN - FUNCTIONAL ASSESSMENT
PAIN_FUNCTIONAL_ASSESSMENT: PREVENTS OR INTERFERES WITH MANY ACTIVE NOT PASSIVE ACTIVITIES
PAIN_FUNCTIONAL_ASSESSMENT: PREVENTS OR INTERFERES SOME ACTIVE ACTIVITIES AND ADLS
PAIN_FUNCTIONAL_ASSESSMENT: PREVENTS OR INTERFERES SOME ACTIVE ACTIVITIES AND ADLS

## 2019-05-23 ASSESSMENT — PAIN DESCRIPTION - ORIENTATION
ORIENTATION: RIGHT

## 2019-05-23 ASSESSMENT — PAIN DESCRIPTION - LOCATION
LOCATION: PELVIS
LOCATION: HIP
LOCATION: PELVIS
LOCATION: HIP

## 2019-05-23 ASSESSMENT — PAIN DESCRIPTION - ONSET
ONSET: ON-GOING

## 2019-05-23 ASSESSMENT — PAIN DESCRIPTION - DESCRIPTORS
DESCRIPTORS: ACHING
DESCRIPTORS: ACHING
DESCRIPTORS: ACHING;DISCOMFORT

## 2019-05-23 NOTE — PLAN OF CARE
Problem: Falls - Risk of:  Goal: Will remain free from falls  Description  Will remain free from falls  Outcome: Ongoing  Note:   Pt using call light appropriately to call for assistance with ambulation to the bathroom and to chair. Pt is also compliant with use of non-skid slippers. Pt reports understanding of fall prevention when discussed. Bed alarm and chair alarm on. Problem: Pain:  Goal: Pain level will decrease  Description  Pain level will decrease   Outcome: Ongoing  Note:   Pt report pain at 9 on scale. Pt states oral medication helping to achieve pain goal of a 4 on scale. Problem: Risk for Impaired Skin Integrity  Goal: Tissue integrity - skin and mucous membranes  Description  Structural intactness and normal physiological function of skin and  mucous membranes. Outcome: Ongoing  Note:   Scattered bruising noted on patient. Turn and reposition patient as needed. Patient turns well. Problem: Discharge Planning:  Goal: Patients continuum of care needs are met  Description  Patients continuum of care needs are met  Outcome: Ongoing  Note:   Pt plans tcu at discharge. Care manager and social working helping with discharge needs. Problem: Musculor/Skeletal Functional Status  Goal: Highest potential functional level  Outcome: Ongoing  Note:   Up with 1 assist, walker, and gait belt. Requires assistance to mobilize out of bed, chair and to bathroom. Independent once set up for meals and bathing. Problem: Respiratory  Goal: O2 Sat > 90%  Outcome: Ongoing  Note:   Patient on 2L of oxygen and level is at 92-93%     Problem: GI  Goal: No bowel complications  Outcome: Ongoing  Note:   No bowel movement this shift. Passing gas. Care plan reviewed with patient and family. Patient and family verbalize understanding of the plan of care and contribute to goal setting.

## 2019-05-23 NOTE — PLAN OF CARE
Problem: Falls - Risk of:  Goal: Will remain free from falls  Description  Will remain free from falls  5/22/2019 2302 by Tamica Downs RN  Outcome: Ongoing  Note:   Patient not trying to get up on her own, bed alarm on. Problem: Falls - Risk of:  Goal: Absence of physical injury  Description  Absence of physical injury  Outcome: Ongoing  Note:   No sign of injury, hourly rounding continues. Problem: Pain:  Description  Pain management should include both nonpharmacologic and pharmacologic interventions. Goal: Pain level will decrease  Description  Pain level will decrease   5/22/2019 2302 by Tamica Downs RN  Outcome: Ongoing  Note:   Pain goal is 4/10. Right hip pain continues. Problem: Risk for Impaired Skin Integrity  Goal: Tissue integrity - skin and mucous membranes  Description  Structural intactness and normal physiological function of skin and  mucous membranes. 5/22/2019 2302 by Tamica Downs RN  Outcome: Ongoing  Note:   Encourage patient to turn in bed, heels elevated. Problem: Discharge Planning:  Goal: Patients continuum of care needs are met  Description  Patients continuum of care needs are met  5/22/2019 2302 by Tamica Downs RN  Outcome: Ongoing  Note:   Tcu/rehab consult. Problem: Musculor/Skeletal Functional Status  Goal: Highest potential functional level  Outcome: Ongoing  Note:   Patient up with assist to commode and back to bed. Working with therapy. Problem: Cardiovascular  Goal: No DVT, peripheral vascular complications  Outcome: Ongoing  Note:   On lovenox, denies chest pain. Problem: Respiratory  Goal: O2 Sat > 90%  Outcome: Ongoing  Note:   Remains on oxygen at 2 liters, patient does wear oxygen at home when needed. Problem: GI  Goal: No bowel complications  Outcome: Ongoing  Note:   No bm this admission. Patient request MOM in the am.    Care plan reviewed with patient.   Patient verbalize understanding of the plan of care and contribute to goal setting.

## 2019-05-23 NOTE — PROGRESS NOTES
Family Medicine Progress Notes/Coverage    Today's Date: 5/23/19  7K-01/001-A  Medical Record # [id number]  Account # [de-identified]      Ms. Rod admitted on 5/21/2019        Subjective / Interval History :     Right hip pain  Seen with her nephew at bedside  doing well, No SOB, No chest pain , No fatigue. No nausea nor abdominal pain  Reviewed notes, consults, laboratory and radiology results,    Objective:       Physical Exam:  Patient Vitals for the past 24 hrs:   BP Temp Temp src Pulse Resp SpO2   05/23/19 0256 (!) 119/53 98.8 °F (37.1 °C) Oral 62 20 93 %   05/22/19 2256 (!) 134/59 98.4 °F (36.9 °C) Oral 65 20 94 %   05/22/19 1939 129/61 98.5 °F (36.9 °C) Oral 66 20 95 %   05/22/19 1536 (!) 120/56 98.4 °F (36.9 °C) Oral 63 16 92 %   05/22/19 0849 (!) 116/53 98 °F (36.7 °C) Oral 68 16 94 %       General Appearance:  Alert, cooperative, no distress, appears stated age   HEENT:  Neck:      Chest/Lungs:  Heart: CTA  RRR   Abdomen:  Back: Soft    Extremities:  Neurological Exam: No edema  WNL       Assessment:     Admitting Diagnosis:    Fracture of right pelvis, closed, initial encounter ([facility] [address] [S32. [address] Problems    Diagnosis Date Noted    Fracture of right pelvis, closed, initial encounter ([facility] [address] [S32. 9XXA] 05/21/2019     Priority: High    Anxiety [F41.9] 06/29/2016     Priority: High    Breast cancer metastasized to liver ([facility] [address] [C50.919, C78.7] 06/08/2016     Priority: High    Personal history of cardiac dysrhythmia [Z86.79]      Priority: Medium    Current chronic use of systemic steroids [Z79.52] 09/04/2018     Priority: Medium    H/O: CVA (cerebrovascular accident) [Z86.73] 11/06/2017     Priority: Medium    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]      Priority: Medium    Steroid-induced hyperglycemia [R73.9, T38.0X5A] 04/19/2016     Priority: Medium    Hypothyroidism due to acquired atrophy of thyroid [E03.4] 03/03/2016     Priority: Medium    Temporal arteritis (HCC) [M31.6]      Priority: Medium    GERD (gastroesophageal reflux disease) [K21.9]      Priority: Medium    Hypothyroidism [E03.9]     Superior and Inferior Pubic ramus fracture, right, closed, initial encounter [facility]) Ayaka Pitts 05/21/2019    Lumbosacral spinal stenosis [M48.07] 05/01/2019       Plan:     Discussed plans with the nursing staff  ECF placement , OT/PT was discussed with the patient and nephew    Medications, Laboratories and Imaging results:    Scheduled Meds:   enoxaparin  30 mg Subcutaneous Q24H    sodium chloride flush  10 mL Intravenous 2 times per day    clopidogrel  75 mg Oral Daily    isosorbide mononitrate  30 mg Oral Daily    levothyroxine  88 mcg Oral Daily    cetirizine  5 mg Oral Daily    metoprolol tartrate  12.5 mg Oral BID    therapeutic multivitamin-minerals  1 tablet Oral Daily    pantoprazole  40 mg Oral QAM AC    predniSONE  2 mg Oral Daily    sertraline  50 mg Oral BID    simvastatin  20 mg Oral Nightly    aspirin  325 mg Oral Daily     Continuous Infusions:  PRN Meds:ALPRAZolam, sodium chloride flush, acetaminophen, magnesium hydroxide, ondansetron, HYDROcodone 5 mg - acetaminophen **OR** HYDROcodone 5 mg - acetaminophen, morphine **OR** morphine, albuterol    Imaging:    Lab Review :    Lab Results   Component Value Date    WBC 9.5 05/23/2019    HGB 10.1 (L) 05/23/2019    HCT 34.3 (L) 05/23/2019    MCV 93.0 05/23/2019     (L) 05/23/2019     Lab Results   Component Value Date    CREATININE 0.7 05/21/2019    BUN 17 05/21/2019     05/21/2019    K 4.6 05/21/2019     05/21/2019    CO2 20 (L) 05/21/2019     Lab Results   Component Value Date    CKTOTAL 41 10/16/2016     Lab Results   Component Value Date    ALT 15 12/22/2018    AST 14 12/22/2018    ALKPHOS 53 12/22/2018    BILITOT 0.6 12/22/2018     Lab Results   Component Value Date LIPASE 11.4 12/22/2018         Electronically signed by Ilan Joseph MD on 5/23/19 at 7:27 Leesa Francisco M.D.

## 2019-05-23 NOTE — PROGRESS NOTES
Orthopaedic Progress Note      SUBJECTIVE   Ms. Rod was seen resting in bed with therapy working with her. States that her right hip pain is a bit more stable, however still increases with motion/use. No new problems. She was concerned about blood thinners to prevent blood clots. OBJECTIVE      Physical    VITALS:  BP (!) 119/53   Pulse 62   Temp 98.8 °F (37.1 °C) (Oral)   Resp 20   Ht 5' 2\" (1.575 m)   Wt 151 lb (68.5 kg)   LMP  (LMP Unknown)   SpO2 93%   BMI 27.62 kg/m²   I/O last 3 completed shifts: In: 940 [P.O.:940]  Out: 1625 [Urine:1625]      RLE:  Skin intact without rash or lesion noted. Log roll of right hip mildly increases her pain, however any attempt at abduction/adduction, or flexion moderately exacerbates her pain. Calf soft nontender. ROM knee, ankle, toes intact without difficulty or pain. NVI, no numbness/tingling. Intact distal pulses and sensation.       Data  CBC:   Lab Results   Component Value Date    WBC 9.5 05/23/2019    HGB 10.1 05/23/2019     05/23/2019     BMP:    Lab Results   Component Value Date     05/21/2019    K 4.6 05/21/2019    K 4.2 12/24/2018     05/21/2019    CO2 20 05/21/2019    BUN 17 05/21/2019    CREATININE 0.7 05/21/2019    CALCIUM 8.7 05/21/2019    GLUCOSE 186 05/21/2019    GLUCOSE 103 07/23/2018     Uric Acid:  No components found for: URIC  PT/INR:    Lab Results   Component Value Date    INR 0.91 05/21/2019     Troponin:  No results found for: TROPONINI  Urine Culture:  No components found for: CURINE      Current Inpatient Medications    Current Facility-Administered Medications: enoxaparin (LOVENOX) injection 30 mg, 30 mg, Subcutaneous, Q24H  ALPRAZolam (XANAX) tablet 0.5 mg, 0.5 mg, Oral, TID PRN  sodium chloride flush 0.9 % injection 10 mL, 10 mL, Intravenous, 2 times per day  sodium chloride flush 0.9 % injection 10 mL, 10 mL, Intravenous, PRN  acetaminophen (TYLENOL) tablet 650 mg, 650 mg, Oral, Q4H PRN  magnesium hydroxide (MILK OF MAGNESIA) 400 MG/5ML suspension 30 mL, 30 mL, Oral, Daily PRN  ondansetron (ZOFRAN) injection 4 mg, 4 mg, Intravenous, Q6H PRN  HYDROcodone-acetaminophen (NORCO) 5-325 MG per tablet 1 tablet, 1 tablet, Oral, Q4H PRN **OR** HYDROcodone-acetaminophen (NORCO) 5-325 MG per tablet 2 tablet, 2 tablet, Oral, Q4H PRN  morphine (PF) injection 2 mg, 2 mg, Intravenous, Q2H PRN **OR** morphine injection 4 mg, 4 mg, Intravenous, Q2H PRN  albuterol (PROVENTIL) nebulizer solution 2.5 mg, 2.5 mg, Nebulization, Q6H PRN  clopidogrel (PLAVIX) tablet 75 mg, 75 mg, Oral, Daily  isosorbide mononitrate (IMDUR) extended release tablet 30 mg, 30 mg, Oral, Daily  levothyroxine (SYNTHROID) tablet 88 mcg, 88 mcg, Oral, Daily  cetirizine (ZYRTEC) tablet 5 mg, 5 mg, Oral, Daily  metoprolol tartrate (LOPRESSOR) tablet 12.5 mg, 12.5 mg, Oral, BID  therapeutic multivitamin-minerals 1 tablet, 1 tablet, Oral, Daily  pantoprazole (PROTONIX) tablet 40 mg, 40 mg, Oral, QAM AC  predniSONE (DELTASONE) tablet 2 mg, 2 mg, Oral, Daily  sertraline (ZOLOFT) tablet 50 mg, 50 mg, Oral, BID  simvastatin (ZOCOR) tablet 20 mg, 20 mg, Oral, Nightly  aspirin EC tablet 325 mg, 325 mg, Oral, Daily        PLAN    1)  Cont with current medical management  2)  NWB RLE, activity as tolerated  3)  Therapy to cont with treatment  4)  Awaiting ECF kim Castillo PA-C

## 2019-05-23 NOTE — PROGRESS NOTES
Hearing     Isolation Precautions:         []Yes  [x]No  If Yes:  [] Droplet  []Contact []Airborne                   []VRE          []MRSA               []C-diff         [] TB  [] Other:       Skills:   [x]Physical therapy     [x]Occupational Therapy   []IV Antibiotics   [] IV meds   [] TPN     []PEG tube Feedings      []New Colostomy   [] Ileostomy care/teaching    [] Speech Therapy   []Wound Vac   []Complex Dressing Changes    []Terminal care    []Other       Has patient had Prior Skilled care in the Last 60 days:  []Yes  [x]NO   If Yes:   Skilled Facility:    How many skilled days were used?

## 2019-05-23 NOTE — FLOWSHEET NOTE
conversation;Expressed gratitude   Advance Directives (For Healthcare)   Healthcare Directive Yes, patient has an advance directive for healthcare treatment   Type of Healthcare Directive Durable power of  for health care;Living will   Copy in Chart Yes, copy in chart

## 2019-05-23 NOTE — PROGRESS NOTES
Eunthienaj Madhumarilin 60  INPATIENT OCCUPATIONAL THERAPY  [facility] ORTHOPEDICS 7K  EVALUATION    Time:  Time In: 8933  Time Out: 7985  Timed Code Treatment Minutes: 45 Minutes  Minutes: 48          Date: 2019  Patient Name: Milo Blanchard,   Gender: female      MRN: [id number]  : 1933  (80 y.o.)  Referring Practitioner: EMELYN Lo  Diagnosis: fracture of right pelvis   Additional Pertinent Hx: The patient is a 80 y.o. female  With multiple medical comorbidities who was admitted to ortho services for treatment of a right pelvic fracture. Patient has a history of breast CA with mets to the liver and is currently undergoing weekly chemotherapy. She completed a treatment yesterday and notes that she usually gets dizzy afterwards. She reports a fall in her home after an episode of dizziness yesterday. She fell onto her right side landing on the right hip. She was unable to bear weight afterwards. She denies hitting her head and no LOC. X-ray shows a right superior and inferior pubic rami fracture. Patient lives alone and is typically independent. She does ambulate with a walker. CT negative for R femur fracture. Restrictions/Precautions:  Restrictions/Precautions: Weight Bearing, Fall Risk  Right Lower Extremity Weight Bearing: Weight Bearing As Tolerated    Subjective  Chart Reviewed: Yes(medical chart review completed. orders, images, op note)  Patient assessed for rehabilitation services?: Yes    Subjective: RN okayed session. Pt in chair, fatigue noted with pt eyes closing multiple times during sesion. pt pleasant and cooperative.    Comments: Increased time needed this session d/t multiple episodes of incontinence with increased time for clean up    Pain:   min complaints of R hip pain    Social/Functional History:  Lives With: Alone  Type of Home: House  Home Layout: One level  Home Access: Level entry  Home Equipment: Rolling walker, Oxygen(O2 at night)   Bathroom Shower/Tub: Tub/Shower assistance, Moderate assistance  Toilet Transfers Comments: multiple transfer this date     Upper Extremity Assessment:   LUE AROM : WFL  RUE AROM : WFL    LUE Strength  LUE Strength Comment: BUE general deconditioning noted    RUE Tone: Normotonic  LUE Tone: Normotonic       Activity Tolerance: Patient Tolerated treatment well, Patient limited by fatigue, Patient limited by pain       Assessment:  Assessment: Pt requires increased assitance with ADLs, trnasfers and mobility, max increased time for all tasks with cues for safety durin gsession. Pt will continue to benefit from OT services to address deficits and increase safeyt and indep wtih ADL tasks to return to PLOF  Performance deficits / Impairments: Decreased functional mobility , Decreased ADL status, Decreased endurance, Decreased strength, Decreased balance, Decreased safe awareness, Decreased high-level IADLs  Prognosis: Good  REQUIRES OT FOLLOW UP: Yes  Safety Devices in place: Yes  Type of devices: All fall risk precautions in place, Bed alarm in place, Left in bed, Nurse notified, Gait belt    Treatment Initiated:   increased time needed for all tasks. Pt completed multiple transfers this date with fluctuating assistance from mod A - min A with cues for hand placement, ADls, mobility completed as detailed above. Overall good tolerance with rest breaks provided     Discharge Recommendations:  [address], 24 hour supervision or assist    Patient Education:  Patient Education: OT POC, role of OT, discharge planning, bed mobility, trnasfers training, funcitonal mobility, ADLs. Equipment Recommendations:   Other: defer to next level of care    Plan:  Times per week: 6x  Current Treatment Recommendations: Strengthening, Balance Training, Endurance Training, Functional Mobility Training, Patient/Caregiver Education & Training, Self-Care / ADL, Safety Education & Training    Goals:  Patient goals : to get home  Short term goals  Time Frame for Short term goals: by discharge  Short term goal 1: Pt will cmoplete LB ADls with min A and LHAE prn with no vc for safety   Short term goal 2: Pt will complete functional trnasfers with CGA and no > 1 vc for hand placmeent for improved ease with toilet transfers  Short term goal 3: Pt will complete funcitonal mobility to[facility]from [facility] or recliner with CGA and only min vc for safety to increase ease with toileting routine  Short term goal 4: Pt will complete standing ADL with CGA with 1 UE release for > 3 minutes and no > 1 vc to attend to task to increase ease with sink side grooming  Short term goal 5: Pt will demo indep wtih BUE light strenthenng HEP for improved UB strength and activity tolerance needed for ease with funcitonal transfers and ADLs  Long term goals  Time Frame for Long term goals : No LTG established d/t short ELOS    Following session, patient left in safe position with all fall risk precautions in place.

## 2019-05-23 NOTE — PROGRESS NOTES
Physical Therapy   [facility]  INPATIENT PHYSICAL THERAPY  DAILYNOTE  [facility] ORTHOPEDICS 7K - 7K-01/001-A    Time In: 0745  Time Out: 4057  Timed Code Treatment Minutes: 25 Minutes  Minutes: 25          Date: 2019  Patient Name: Nohemi Sutherland,  Gender:  female        MRN: [id number]  : 1933  (80 y.o.)     Referring Practitioner: EMELYN Ulloa  Diagnosis: Fracture of right pelvis, closed  Additional Pertinent Hx: The patient is a 80 y.o. female  With multiple medical comorbidities who was admitted to ortho services for treatment of a right pelvic fracture. Patient has a history of breast CA with mets to the liver and is currently undergoing weekly chemotherapy. She completed a treatment yesterday and notes that she usually gets dizzy afterwards. She reports a fall in her home after an episode of dizziness yesterday. She fell onto her right side landing on the right hip. She was unable to bear weight afterwards. She denies hitting her head and no LOC. X-ray shows a right superior and inferior pubic rami fracture. Patient lives alone and is typically independent. She does ambulate with a walker. CT negative for R femur fracture.      Past Medical History:   Diagnosis Date    Anxiety     Blind right eye     Breast cancer metastasized to liver ([address] 05/10/2016    Liver lesion noted     Carotid stenosis      Left ICA 25%    Colostomy in place [facility]) 2016    Degenerative arthritis of cervical spine     GERD (gastroesophageal reflux disease)     Hypercholesteremia     Hypoestrogenism     Hypothyroidism     Lumbago     Lumbosacral spinal stenosis 2019    MDRO (multiple drug resistant organisms) resistance     pt stated cleared    Personal history of cardiac dysrhythmia     Sigmoid diverticulosis     Spondylolisthesis of lumbosacral region     Steroid-induced hyperglycemia     Subclavian artery stenosis (HCC)     left    Superior and Inferior Pubic reach back for chair before sitting. )       Ambulation 1  Surface: level tile  Device: Rolling Walker  Assistance: Minimal assistance  Quality of Gait: Slow and guarded due to pain. Decreased B step lengths and decreased B step clearance. Mild instability but no LOB  Gait Deviations: Decreased step length; Slow Gillian  Distance: 3 feet x1          Balance  Comments: Static sit at EOB at Stand by Assist.       Exercises:  Exercises  Comments: Seated in bedside chair pt completed B LE ankle pumps, LAQ, marches, hip abd/add, glut set all x10 reps to increase strength for improved functional mobiltiy. Activity Tolerance:  Activity Tolerance: Patient limited by endurance    Assessment: Body structures, Functions, Activity limitations: Decreased functional mobility , Decreased strength, Decreased safe awareness, Decreased balance, Decreased endurance, Increased Pain  Assessment: Pt tolerated session fair. Limited by pain, decreased strength, decreased mobility. Unsteady up on feet. Would benefit from continued therapy before returning home. Prognosis: Good     REQUIRES PT FOLLOW UP: Yes    Discharge Recommendations:  Discharge Recommendations: [address]    Patient Education:  Patient Education: Bed mobility. Transfers. Equipment Recommendations:  Equipment Needed: No    Safety:  Type of devices:  All fall risk precautions in place, Call light within reach, Chair alarm in place, Nurse notified, Patient at risk for falls, Gait belt, Left in chair    Plan:  Times per week: 5 X O  Times per day: Daily  Current Treatment Recommendations: Strengthening, Functional Mobility Training, Transfer Training, Balance Training, Gait Training, Stair training, Home Exercise Program, Equipment Evaluation, Education, & procurement, Safety Education & Training, Patient/Caregiver Education & Training    Goals:  Patient goals : to get stronger    Short term goals  Time Frame for Short term goals: by

## 2019-05-24 ENCOUNTER — HOSPITAL ENCOUNTER (INPATIENT)
Age: 84
LOS: 19 days | Discharge: HOME | DRG: 561 | End: 2019-06-12
Attending: EMERGENCY MEDICINE | Admitting: EMERGENCY MEDICINE
Payer: MEDICARE

## 2019-05-24 VITALS
TEMPERATURE: 97.8 F | WEIGHT: 151 LBS | HEART RATE: 71 BPM | HEIGHT: 62 IN | DIASTOLIC BLOOD PRESSURE: 63 MMHG | SYSTOLIC BLOOD PRESSURE: 145 MMHG | RESPIRATION RATE: 18 BRPM | BODY MASS INDEX: 27.79 KG/M2 | OXYGEN SATURATION: 93 %

## 2019-05-24 DIAGNOSIS — S32.591K: Primary | ICD-10-CM

## 2019-05-24 PROBLEM — S32.591A CLOSED FRACTURE OF RIGHT INFERIOR PUBIC RAMUS (HCC): Status: ACTIVE | Noted: 2019-05-24

## 2019-05-24 LAB
BASOPHILS # BLD: 0.1 %
BASOPHILS ABSOLUTE: 0 THOU/MM3 (ref 0–0.1)
EOSINOPHIL # BLD: 2 %
EOSINOPHILS ABSOLUTE: 0.2 THOU/MM3 (ref 0–0.4)
ERYTHROCYTE [DISTWIDTH] IN BLOOD BY AUTOMATED COUNT: 16.8 % (ref 11.5–14.5)
ERYTHROCYTE [DISTWIDTH] IN BLOOD BY AUTOMATED COUNT: 57.5 FL (ref 35–45)
GLUCOSE BLD-MCNC: 118 MG/DL (ref 70–108)
GLUCOSE BLD-MCNC: 147 MG/DL (ref 70–108)
HCT VFR BLD CALC: 33.6 % (ref 37–47)
HEMOGLOBIN: 9.8 GM/DL (ref 12–16)
IMMATURE GRANS (ABS): 0.06 THOU/MM3 (ref 0–0.07)
IMMATURE GRANULOCYTES: 0.7 %
LYMPHOCYTES # BLD: 19.3 %
LYMPHOCYTES ABSOLUTE: 1.7 THOU/MM3 (ref 1–4.8)
MCH RBC QN AUTO: 27.3 PG (ref 26–33)
MCHC RBC AUTO-ENTMCNC: 29.2 GM/DL (ref 32.2–35.5)
MCV RBC AUTO: 93.6 FL (ref 81–99)
MONOCYTES # BLD: 8.4 %
MONOCYTES ABSOLUTE: 0.7 THOU/MM3 (ref 0.4–1.3)
NUCLEATED RED BLOOD CELLS: 0 /100 WBC
PLATELET # BLD: 108 THOU/MM3 (ref 130–400)
PMV BLD AUTO: 12 FL (ref 9.4–12.4)
RBC # BLD: 3.59 MILL/MM3 (ref 4.2–5.4)
SEG NEUTROPHILS: 69.5 %
SEGMENTED NEUTROPHILS ABSOLUTE COUNT: 6.1 THOU/MM3 (ref 1.8–7.7)
WBC # BLD: 8.8 THOU/MM3 (ref 4.8–10.8)

## 2019-05-24 PROCEDURE — 6370000000 HC RX 637 (ALT 250 FOR IP): Performed by: ORTHOPAEDIC SURGERY

## 2019-05-24 PROCEDURE — 6370000000 HC RX 637 (ALT 250 FOR IP): Performed by: PHYSICIAN ASSISTANT

## 2019-05-24 PROCEDURE — 97530 THERAPEUTIC ACTIVITIES: CPT

## 2019-05-24 PROCEDURE — 1290000000 HC SEMI PRIVATE OTHER R&B

## 2019-05-24 PROCEDURE — 97110 THERAPEUTIC EXERCISES: CPT

## 2019-05-24 PROCEDURE — 85025 COMPLETE CBC W/AUTO DIFF WBC: CPT

## 2019-05-24 PROCEDURE — 0220000000 HC SKILLED NURSING FACILITY

## 2019-05-24 PROCEDURE — 82948 REAGENT STRIP/BLOOD GLUCOSE: CPT

## 2019-05-24 PROCEDURE — 36415 COLL VENOUS BLD VENIPUNCTURE: CPT

## 2019-05-24 RX ORDER — ALPRAZOLAM 0.5 MG/1
0.5 TABLET ORAL 3 TIMES DAILY PRN
Status: DISCONTINUED | OUTPATIENT
Start: 2019-05-24 | End: 2019-06-12 | Stop reason: HOSPADM

## 2019-05-24 RX ORDER — ALBUTEROL SULFATE 2.5 MG/3ML
2.5 SOLUTION RESPIRATORY (INHALATION) EVERY 6 HOURS PRN
Status: CANCELLED | OUTPATIENT
Start: 2019-05-24

## 2019-05-24 RX ORDER — ALBUTEROL SULFATE 2.5 MG/3ML
2.5 SOLUTION RESPIRATORY (INHALATION) EVERY 6 HOURS PRN
Status: DISCONTINUED | OUTPATIENT
Start: 2019-05-24 | End: 2019-05-27

## 2019-05-24 RX ORDER — LEVOTHYROXINE SODIUM 88 UG/1
88 TABLET ORAL DAILY
Status: CANCELLED | OUTPATIENT
Start: 2019-05-25

## 2019-05-24 RX ORDER — CLOPIDOGREL BISULFATE 75 MG/1
75 TABLET ORAL DAILY
Status: DISCONTINUED | OUTPATIENT
Start: 2019-05-25 | End: 2019-05-25

## 2019-05-24 RX ORDER — M-VIT,TX,IRON,MINS/CALC/FOLIC 27MG-0.4MG
1 TABLET ORAL DAILY
Status: CANCELLED | OUTPATIENT
Start: 2019-05-25

## 2019-05-24 RX ORDER — LEVOTHYROXINE SODIUM 88 UG/1
88 TABLET ORAL DAILY
Status: DISCONTINUED | OUTPATIENT
Start: 2019-05-25 | End: 2019-06-12 | Stop reason: HOSPADM

## 2019-05-24 RX ORDER — HYDROCODONE BITARTRATE AND ACETAMINOPHEN 5; 325 MG/1; MG/1
1 TABLET ORAL EVERY 4 HOURS PRN
Status: DISCONTINUED | OUTPATIENT
Start: 2019-05-24 | End: 2019-06-12 | Stop reason: HOSPADM

## 2019-05-24 RX ORDER — CETIRIZINE HYDROCHLORIDE 10 MG/1
5 TABLET ORAL DAILY
Status: DISCONTINUED | OUTPATIENT
Start: 2019-05-25 | End: 2019-06-12 | Stop reason: HOSPADM

## 2019-05-24 RX ORDER — ACETAMINOPHEN 325 MG/1
650 TABLET ORAL EVERY 4 HOURS PRN
Status: DISCONTINUED | OUTPATIENT
Start: 2019-05-24 | End: 2019-06-12 | Stop reason: HOSPADM

## 2019-05-24 RX ORDER — PANTOPRAZOLE SODIUM 40 MG/1
40 TABLET, DELAYED RELEASE ORAL
Status: DISCONTINUED | OUTPATIENT
Start: 2019-05-25 | End: 2019-06-03

## 2019-05-24 RX ORDER — PREDNISONE 1 MG/1
2 TABLET ORAL DAILY
Status: CANCELLED | OUTPATIENT
Start: 2019-05-25

## 2019-05-24 RX ORDER — SIMVASTATIN 20 MG
20 TABLET ORAL NIGHTLY
Status: DISCONTINUED | OUTPATIENT
Start: 2019-05-24 | End: 2019-06-12 | Stop reason: HOSPADM

## 2019-05-24 RX ORDER — M-VIT,TX,IRON,MINS/CALC/FOLIC 27MG-0.4MG
1 TABLET ORAL DAILY
Status: DISCONTINUED | OUTPATIENT
Start: 2019-05-25 | End: 2019-06-12 | Stop reason: HOSPADM

## 2019-05-24 RX ORDER — ISOSORBIDE MONONITRATE 30 MG/1
30 TABLET, EXTENDED RELEASE ORAL DAILY
Status: CANCELLED | OUTPATIENT
Start: 2019-05-25

## 2019-05-24 RX ORDER — PREDNISONE 1 MG/1
2 TABLET ORAL DAILY
Status: DISCONTINUED | OUTPATIENT
Start: 2019-05-25 | End: 2019-06-12 | Stop reason: HOSPADM

## 2019-05-24 RX ORDER — HYDROCODONE BITARTRATE AND ACETAMINOPHEN 5; 325 MG/1; MG/1
1 TABLET ORAL EVERY 4 HOURS PRN
Status: CANCELLED | OUTPATIENT
Start: 2019-05-24

## 2019-05-24 RX ORDER — CLOPIDOGREL BISULFATE 75 MG/1
75 TABLET ORAL DAILY
Status: CANCELLED | OUTPATIENT
Start: 2019-05-25

## 2019-05-24 RX ORDER — ALPRAZOLAM 0.5 MG/1
0.5 TABLET ORAL 3 TIMES DAILY PRN
Status: CANCELLED | OUTPATIENT
Start: 2019-05-24

## 2019-05-24 RX ORDER — HYDROCODONE BITARTRATE AND ACETAMINOPHEN 5; 325 MG/1; MG/1
2 TABLET ORAL EVERY 4 HOURS PRN
Status: DISCONTINUED | OUTPATIENT
Start: 2019-05-24 | End: 2019-06-12 | Stop reason: HOSPADM

## 2019-05-24 RX ORDER — PANTOPRAZOLE SODIUM 40 MG/1
40 TABLET, DELAYED RELEASE ORAL
Status: CANCELLED | OUTPATIENT
Start: 2019-05-25

## 2019-05-24 RX ORDER — ACETAMINOPHEN 325 MG/1
650 TABLET ORAL EVERY 4 HOURS PRN
Status: CANCELLED | OUTPATIENT
Start: 2019-05-24

## 2019-05-24 RX ORDER — CETIRIZINE HYDROCHLORIDE 10 MG/1
5 TABLET ORAL DAILY
Status: CANCELLED | OUTPATIENT
Start: 2019-05-25

## 2019-05-24 RX ORDER — HYDROCODONE BITARTRATE AND ACETAMINOPHEN 5; 325 MG/1; MG/1
2 TABLET ORAL EVERY 4 HOURS PRN
Status: CANCELLED | OUTPATIENT
Start: 2019-05-24

## 2019-05-24 RX ORDER — SIMVASTATIN 20 MG
20 TABLET ORAL NIGHTLY
Status: CANCELLED | OUTPATIENT
Start: 2019-05-24

## 2019-05-24 RX ORDER — ISOSORBIDE MONONITRATE 30 MG/1
30 TABLET, EXTENDED RELEASE ORAL DAILY
Status: DISCONTINUED | OUTPATIENT
Start: 2019-05-25 | End: 2019-06-12 | Stop reason: HOSPADM

## 2019-05-24 RX ADMIN — LEVOTHYROXINE SODIUM 88 MCG: 88 TABLET ORAL at 06:14

## 2019-05-24 RX ADMIN — HYDROCODONE BITARTRATE AND ACETAMINOPHEN 2 TABLET: 5; 325 TABLET ORAL at 03:28

## 2019-05-24 RX ADMIN — MULTIPLE VITAMINS W/ MINERALS TAB 1 TABLET: TAB at 08:24

## 2019-05-24 RX ADMIN — SERTRALINE 50 MG: 50 TABLET, FILM COATED ORAL at 21:17

## 2019-05-24 RX ADMIN — SIMVASTATIN 20 MG: 20 TABLET, FILM COATED ORAL at 21:17

## 2019-05-24 RX ADMIN — CETIRIZINE HYDROCHLORIDE 5 MG: 5 TABLET ORAL at 08:24

## 2019-05-24 RX ADMIN — MAGNESIUM HYDROXIDE 30 ML: 400 SUSPENSION ORAL at 06:14

## 2019-05-24 RX ADMIN — SERTRALINE 50 MG: 50 TABLET, FILM COATED ORAL at 08:24

## 2019-05-24 RX ADMIN — METOPROLOL TARTRATE 12.5 MG: 25 TABLET ORAL at 08:24

## 2019-05-24 RX ADMIN — ASPIRIN 325 MG: 325 TABLET, DELAYED RELEASE ORAL at 08:24

## 2019-05-24 RX ADMIN — METOPROLOL TARTRATE 12.5 MG: 25 TABLET ORAL at 21:17

## 2019-05-24 RX ADMIN — PREDNISONE 2 MG: 1 TABLET ORAL at 08:24

## 2019-05-24 RX ADMIN — HYDROCODONE BITARTRATE AND ACETAMINOPHEN 2 TABLET: 5; 325 TABLET ORAL at 08:24

## 2019-05-24 RX ADMIN — CLOPIDOGREL BISULFATE 75 MG: 75 TABLET ORAL at 08:24

## 2019-05-24 RX ADMIN — ISOSORBIDE MONONITRATE 30 MG: 30 TABLET ORAL at 08:24

## 2019-05-24 RX ADMIN — PANTOPRAZOLE SODIUM 40 MG: 40 TABLET, DELAYED RELEASE ORAL at 06:14

## 2019-05-24 RX ADMIN — HYDROCODONE BITARTRATE AND ACETAMINOPHEN 2 TABLET: 5; 325 TABLET ORAL at 21:18

## 2019-05-24 ASSESSMENT — PAIN DESCRIPTION - ONSET
ONSET: ON-GOING

## 2019-05-24 ASSESSMENT — PAIN DESCRIPTION - LOCATION
LOCATION: HIP;PELVIS
LOCATION: HIP;PELVIS
LOCATION: HIP
LOCATION: HIP

## 2019-05-24 ASSESSMENT — PAIN SCALES - GENERAL
PAINLEVEL_OUTOF10: 9
PAINLEVEL_OUTOF10: 0
PAINLEVEL_OUTOF10: 9
PAINLEVEL_OUTOF10: 0
PAINLEVEL_OUTOF10: 10
PAINLEVEL_OUTOF10: 9

## 2019-05-24 ASSESSMENT — PAIN DESCRIPTION - PROGRESSION
CLINICAL_PROGRESSION: GRADUALLY WORSENING
CLINICAL_PROGRESSION: NOT CHANGED

## 2019-05-24 ASSESSMENT — PAIN DESCRIPTION - ORIENTATION
ORIENTATION: RIGHT

## 2019-05-24 ASSESSMENT — PAIN DESCRIPTION - PAIN TYPE
TYPE: ACUTE PAIN
TYPE: ACUTE PAIN;OTHER (COMMENT)

## 2019-05-24 ASSESSMENT — PAIN DESCRIPTION - DESCRIPTORS
DESCRIPTORS: ACHING
DESCRIPTORS: ACHING

## 2019-05-24 ASSESSMENT — PAIN DESCRIPTION - FREQUENCY
FREQUENCY: CONTINUOUS
FREQUENCY: INTERMITTENT
FREQUENCY: INTERMITTENT

## 2019-05-24 NOTE — DISCHARGE SUMMARY
Patient ID:  Vee Jarquin  [id number]  80 y.o.  5/29/1933    Admit date: 5/21/2019    Discharge date and time: No discharge date for patient encounter. Admitting Physician: Deborah Benavidez MD     Discharge Physician: Deborah Benavidez MD    Admission Diagnoses: Fracture of right pelvis, closed, [facility] [S32. 9XXA]    Discharge Diagnoses: Fracture of right pelvis, closed, initial encounter ([facility] [S32. 9XXA]    Hospital Course: patient admitted after a fall to ortho services after a fall in her home. Underwent pain control, PT/OT. Pain was well controlled. VS and labs remained stable. Consults:  medicine    Treatments: PT/OT    Disposition: fair/stable. TCU    Patient Instructions:      Medication List      ASK your doctor about these medications    acetaminophen 500 MG tablet  Commonly known as:  TYLENOL     albuterol (2.5 MG/3ML) 0.083% nebulizer solution  Commonly known as:  PROVENTIL  Take 3 mLs by nebulization every 6 hours as needed for Wheezing     ALPRAZolam 0.5 MG tablet  Commonly known as:  XANAX     aspirin 81 MG chewable tablet  Commonly known as:  RA ASPIRIN ADULT LOW STRENGTH  Take 1 tablet by mouth daily     blood glucose test strips strip  Commonly known as:  ONETOUCH VERIO  Use to check blood sugar once daily.  E11.9     CENTRUM SILVER ADULT 50+ Tabs  Take 1 tablet by mouth daily     clopidogrel 75 MG tablet  Commonly known as:  PLAVIX  take 1 tablet by mouth once daily     isosorbide mononitrate 30 MG extended release tablet  Commonly known as:  IMDUR  take 1 tablet by mouth once daily     levothyroxine 88 MCG tablet  Commonly known as:  SYNTHROID  take 1 tablet by mouth once daily     Lift Chair Misc  by Does not apply route     loratadine 10 MG tablet  Commonly known as:  RA LORATADINE  take 1 tablet by mouth once daily     metoprolol tartrate 25 MG tablet  Commonly known as:  LOPRESSOR  take 1/2 tablet by mouth twice a day     Nebulizer Compressor Misc  Albuterol 1 unit dose every

## 2019-05-24 NOTE — PROGRESS NOTES
Nutrition Assessment    Type and Reason for Visit: Initial, Consult(nutrition assessment/ TCU admission)    Nutrition Recommendations: Continue current diet and multivitamin as ordered. ONS initiated. Nutrition Assessment:   Pt. nutritionally compromised AEB patient report of occasional use of Boost supplement and eats Activia yogurt daily at home, reports being in need for having a BM. At risk for further nutrition compromise r/t admit with right pubic ramus fracture. Will provide Ensure enlive once daily and Acitivia TID. Malnutrition Assessment:  · Malnutrition Status: At risk for malnutrition  · Context: Acute illness or injury    Nutrition Risk Level:  Moderate    Nutrient Needs:  · Estimated Daily Total Kcal: 4943-9705 kcals (22-25 kcal/kgm- 67kgm 5/24)  · Estimated Daily Protein (g): 65-75 grams (1.3-1.5 grams protein/kgm -ideal 50kgm)    Nutrition Diagnosis:   · Problem: Inadequate oral intake  · Etiology: related to Insufficient energy/nutrient consumption     Signs and symptoms:  as evidenced by Patient report of(home use of oral nutrition supplement)    Objective Information:  · Nutrition-Focused Physical Findings: no stool yet since being in hospital, denies difficulty chewing or swallowing, medication includes multivitamin and deltasone; glucose 118  · Wound Type: (scattered bruising)  · Current Nutrition Therapies:  · Oral Diet Orders: General   · Oral Diet intake: 51-75%, %  · Oral Nutrition Supplement (ONS) Orders: Standard High Calorie Oral Supplement(Ensure enlive once daily and Activia TID)  · ONS intake: Unable to assess  · Anthropometric Measures:  · Ht: 5' 2\" (157.5 cm)   · Current Body Wt: 148 lb 4.8 oz (67.3 kg)  · Admission Body Wt: 148 lb 4.8 oz (67.3 kg)(5/24; no edema)  · Usual Body Wt: 151 lb (68.5 kg)(per patient; per EHR: 12/11/18 148# 3.2oz)  · Ideal Body Wt: 110 lb (49.9 kg),   · BMI Classification: BMI 25.0 - 29.9 Overweight    Nutrition Interventions:   Continue current diet, Start ONS  Continued Inpatient Monitoring, Education Initiated, Coordination of Care    Nutrition Evaluation:   · Evaluation: Goals set   · Goals: Patient will consume 75% or more of meals during LOS.      · Monitoring: Meal Intake, Supplement Intake, Diet Tolerance, Skin Integrity, Weight, Pertinent Labs, Monitor Bowel Function      Electronically signed by Rosie iLvingston RD, BAKARI on 5/24/19 at 3:30 PM    Contact Number: [phone]

## 2019-05-24 NOTE — PROGRESS NOTES
Family Medicine Progress Notes/Coverage    Today's Date: 5/24/19  7K-01/001-A  Medical Record # [id number]  Account # [de-identified]      Ms. Rod admitted on 5/21/2019        Subjective / Interval History :     Sitting on a chair when seen  doing well, No SOB, No chest pain , No fatigue. No nausea nor abdominal pain  Reviewed notes, consults, laboratory and radiology results,    Objective:       Physical Exam:  Patient Vitals for the past 24 hrs:   BP Temp Temp src Pulse Resp SpO2   05/24/19 0819 (!) 145/63 97.8 °F (36.6 °C) Oral 71 18 --   05/24/19 0328 (!) 115/58 97.9 °F (36.6 °C) Oral 61 18 93 %   05/23/19 1950 (!) 97/53 99.4 °F (37.4 °C) Oral 72 18 93 %   05/23/19 1345 (!) 96/50 99.8 °F (37.7 °C) Oral 77 18 91 %       General Appearance:  Alert, cooperative, no distress, appears stated age   HEENT:  Neck: WNL  supple   Chest/Lungs:  Heart: + wheezing otherwise CTA   RRR   Abdomen:  Back: spft   Extremities:  Neurological Exam: No edema  WNL except for pain on the right hip/pelvis       Assessment:     Admitting Diagnosis:    Fracture of right pelvis, closed, initial encounter ([facility] [S32. [address] Problems    Diagnosis Date Noted    Fracture of right pelvis, closed, initial encounter ([facility] [S32. 9XXA] 05/21/2019     Priority: High    Anxiety [F41.9] 06/29/2016     Priority: High    Breast cancer metastasized to liver ([facility] [C50.919, C78.7] 06/08/2016     Priority: High    Personal history of cardiac dysrhythmia [Z86.79]      Priority: Medium    Current chronic use of systemic steroids [Z79.52] 09/04/2018     Priority: Medium    H/O: CVA (cerebrovascular accident) [Z86.73] 11/06/2017     Priority: Medium    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]      Priority: Medium    Steroid-induced hyperglycemia [R73.9, T38.0X5A] 04/19/2016     Priority: Medium    Hypothyroidism due to acquired atrophy of thyroid [E03.4] 03/03/2016     Priority: Medium    Temporal arteritis ([address] [M31.6]      Priority: Medium    GERD (gastroesophageal reflux disease) [K21.9]      Priority: Medium    Hypothyroidism [E03.9]     Superior and Inferior Pubic ramus fracture, right, closed, initial encounter [facility]) [facility] Daily 05/21/2019    Lumbosacral spinal stenosis [M48.07] 05/01/2019       Plan:     Discussed plans with the nursing staff  To TCU when available    Medications, Laboratories and Imaging results:    Scheduled Meds:   enoxaparin  30 mg Subcutaneous BID    sodium chloride flush  10 mL Intravenous 2 times per day    clopidogrel  75 mg Oral Daily    isosorbide mononitrate  30 mg Oral Daily    levothyroxine  88 mcg Oral Daily    cetirizine  5 mg Oral Daily    metoprolol tartrate  12.5 mg Oral BID    therapeutic multivitamin-minerals  1 tablet Oral Daily    pantoprazole  40 mg Oral QAM AC    predniSONE  2 mg Oral Daily    sertraline  50 mg Oral BID    simvastatin  20 mg Oral Nightly    aspirin  325 mg Oral Daily     Continuous Infusions:  PRN Meds:ALPRAZolam, sodium chloride flush, acetaminophen, magnesium hydroxide, ondansetron, HYDROcodone 5 mg - acetaminophen **OR** HYDROcodone 5 mg - acetaminophen, morphine **OR** morphine, albuterol    Imaging:    Lab Review :    Lab Results   Component Value Date    WBC 8.8 05/24/2019    HGB 9.8 (L) 05/24/2019    HCT 33.6 (L) 05/24/2019    MCV 93.6 05/24/2019     (L) 05/24/2019     Lab Results   Component Value Date    CREATININE 0.7 05/21/2019    BUN 17 05/21/2019     05/21/2019    K 4.6 05/21/2019     05/21/2019    CO2 20 (L) 05/21/2019     Lab Results   Component Value Date    CKTOTAL 41 10/16/2016     Lab Results   Component Value Date    ALT 15 12/22/2018    AST 14 12/22/2018    ALKPHOS 53 12/22/2018    BILITOT 0.6 12/22/2018     Lab Results   Component Value Date    LIPASE 11.4 12/22/2018         Electronically signed by Roseline Crow MD on 5/24/19 at 10:17 AM                                                                                           Valeri York M.D.

## 2019-05-24 NOTE — PROGRESS NOTES
0858-Call to [facility] RN on  TCU to review orders, she states she will call back in about 20 minutes after reviewing orders. 0940-Call back to TCU to give report, forwarded to [facility] who states unable to review orders yet, states she will finish current task and review orders and call back. Mate[address] called, report given. Transport requested for wheelchair to new room.

## 2019-05-24 NOTE — PROGRESS NOTES
Called 7k regarding patient stating her purse was still locked up on 7k. Nurse stated she would send it as soon as she could.

## 2019-05-24 NOTE — PROGRESS NOTES
Physical Therapy   [address] [address]  INPATIENT PHYSICAL THERAPY  DAILYNOTE  [facility] - 7K-01/001-A    Time In: 0803  Time Out: 7176  Timed Code Treatment Minutes: 32 Minutes  Minutes: 26          Date: 2019  Patient Name: Shola Diaz,  Gender:  female        MRN: [id number]  : 1933  (80 y.o.)     Referring Practitioner: EMELYN Mitchell  Diagnosis: Fracture of right pelvis, closed  Additional Pertinent Hx: The patient is a 80 y.o. female  With multiple medical comorbidities who was admitted to ortho services for treatment of a right pelvic fracture. Patient has a history of breast CA with mets to the liver and is currently undergoing weekly chemotherapy. She completed a treatment yesterday and notes that she usually gets dizzy afterwards. She reports a fall in her home after an episode of dizziness yesterday. She fell onto her right side landing on the right hip. She was unable to bear weight afterwards. She denies hitting her head and no LOC. X-ray shows a right superior and inferior pubic rami fracture. Patient lives alone and is typically independent. She does ambulate with a walker. CT negative for R femur fracture.      Past Medical History:   Diagnosis Date    Anxiety     Blind right eye     Breast cancer metastasized to liver ([address] 05/10/2016    Liver lesion noted     Carotid stenosis      Left ICA 25%    Colostomy in place [facility]) 2016    Degenerative arthritis of cervical spine     GERD (gastroesophageal reflux disease)     Hypercholesteremia     Hypoestrogenism     Hypothyroidism     Lumbago     Lumbosacral spinal stenosis 2019    MDRO (multiple drug resistant organisms) resistance     pt stated cleared    Personal history of cardiac dysrhythmia     Sigmoid diverticulosis     Spondylolisthesis of lumbosacral region     Steroid-induced hyperglycemia     Subclavian artery stenosis (HCC)     left    Superior and Inferior Pubic ramus fracture, right, closed, initial encounter ([facility] 05/21/2019    SVT (supraventricular tachycardia) ([facility] 6/07    Temporal arteritis ([facility]     Vertebral artery occlusion     left    Vitamin D deficiency 06/2017     Past Surgical History:   Procedure Laterality Date    CARDIAC CATHETERIZATION  5/07    CATARACT REMOVAL  right 1/08; left 2/08    CHOLECYSTECTOMY  1/03    COLONOSCOPY  11/06    COLOSTOMY  09/04/2018    REVERSAL OF COLOSTOMY    DILATION AND CURETTAGE OF UTERUS      ENDOSCOPY, COLON, DIAGNOSTIC      EYE SURGERY      EYE SURGERY Right 11/06/2017    Dr Lopez in [address]      partial    OTHER SURGICAL HISTORY  05/27/2016    robotic assisted laparoscopic anterior colon resection and end colostomy    AL OFFICE/OUTPT VISIT,PROCEDURE ONLY N/A 9/4/2018    COLOSTOMY REVERSAL AND PARASTOMAL HERNIA REPAIR performed by Rochelle Scott MD at [address] [facility]  2002    UPPER GASTROINTESTINAL ENDOSCOPY  11/06       Restrictions/Precautions:  Weight Bearing, Fall Risk     Right Lower Extremity Weight Bearing: Weight Bearing As Tolerated                      Prior Level of Function:  ADL Assistance: Independent  Homemaking Assistance: Needs assistance  Ambulation Assistance: Independent  Transfer Assistance: Independent  Additional Comments: Pt states amb with RW, O2 at night; has aide services 5 days/week that assists with laundry, showers     Subjective:  Chart Reviewed: Yes  Family / Caregiver Present: No  Subjective: Pt resting in bed, requesting to use bedside commode. Pain:  Yes.   Pain Assessment  Pain Level: 9  Pain Type: Acute pain  Pain Location: Hip;Pelvis  Pain Orientation: Right       Social/Functional:  Lives With: Alone  Type of Home: House  Home Layout: One level  Home Access: Level entry  Home Equipment: Rolling walker, Oxygen(O2 at night)     Objective:  Supine to Sit: Minimal assistance  Sit to Supine: Unable to assess    Transfers  Sit to Stand: Minimal Assistance; Moderate Assistance(Min from EOB. Mod from bedside commode. Cuing for hand placement. )  Stand to sit: Minimal Assistance       Ambulation 1  Surface: level tile  Device: Rolling Walker  Assistance: Minimal assistance  Gait Deviations: Slow Igllian;Decreased step length(Guarded due to pain. Minimal weight bearing through R LE. Unsteady. )  Distance: 3 feet x2        Balance  Comments: Static stand at AD while PTA completed dc clean up. Contact Guard for safety. Exercises:  Pt completed BLE ankle pumps, LAQ, marches, hip abd/add, glut set all x10 reps to increase strength for improved functional mobility    Activity Tolerance:  Activity Tolerance: Patient limited by endurance; Patient limited by pain    Assessment: Body structures, Functions, Activity limitations: Decreased functional mobility , Decreased strength, Decreased safe awareness, Decreased balance, Decreased endurance, Increased Pain  Assessment: Pt tolerated fairly well. Continues to be limited by pain, decreased strength, decreased mobility. Would benefit from continued therapy at discharge. Prognosis: Good     REQUIRES PT FOLLOW UP: Yes    Discharge Recommendations:  Discharge Recommendations: Patient would benefit from continued therapy after discharge, Continue to assess pending progress    Patient Education:  Patient Education: Bed mobility. Transfers. Equipment Recommendations:  Equipment Needed: No    Safety:  Type of devices:  All fall risk precautions in place, Call light within reach, Chair alarm in place, Nurse notified, Patient at risk for falls, Gait belt, Left in chair    Plan:  Times per week: 5 X O  Times per day: Daily  Current Treatment Recommendations: Strengthening, Functional Mobility Training, Transfer Training, Balance Training, Gait Training, Stair training, Home Exercise Program, Equipment Evaluation, Education, & procurement, Safety Education & Training, Patient/Caregiver Education &

## 2019-05-24 NOTE — PLAN OF CARE
Problem: Falls - Risk of:  Goal: Will remain free from falls  Description  Will remain free from falls  5/24/2019 0028 by Brian Anand RN  Outcome: Ongoing  Note:   Patient not trying to get up on her own, bed alarm on. Problem: Falls - Risk of:  Goal: Absence of physical injury  Description  Absence of physical injury  Outcome: Ongoing  Note:   No sign of injury, hourly rounding continues. Problem: Pain:  Description  Pain management should include both nonpharmacologic and pharmacologic interventions. Goal: Pain level will decrease  Description  Pain level will decrease   5/24/2019 0028 by Brian Anand RN  Outcome: Ongoing  Note:   Patient able to rest after pain medication. Pain goal is 4/10. Problem: Risk for Impaired Skin Integrity  Goal: Tissue integrity - skin and mucous membranes  Description  Structural intactness and normal physiological function of skin and  mucous membranes. 5/24/2019 0028 by Brian Anand RN  Outcome: Ongoing  Note:   Patient will allow you to assist with turns. Heels elevated. Problem: Discharge Planning:  Goal: Patients continuum of care needs are met  Description  Patients continuum of care needs are met  5/24/2019 0028 by Brian Anand RN  Outcome: Ongoing  Note:   Planning tcu at discharge. Problem: Musculor/Skeletal Functional Status  Goal: Highest potential functional level  5/24/2019 0028 by Brian Anand RN  Outcome: Ongoing  Note:   Up with assist and walker. Problem: Cardiovascular  Goal: No DVT, peripheral vascular complications  Outcome: Ongoing  Note:   Scds in place, denies chest pain. Problem: Respiratory  Goal: O2 Sat > 90%  5/24/2019 0028 by Brian Anand RN  Outcome: Ongoing  Note:   Patient remains on oxygen at 2 liters. Problem: GI  Goal: No bowel complications  0/01/1509 0028 by Brian Anand RN  Outcome: Ongoing  Note:   No bm this shift. Care plan reviewed with patient.   Patient verbalize understanding of the plan of care and contribute to goal setting.

## 2019-05-24 NOTE — CARE COORDINATION
5/23/19, 10:22 AM    DISCHARGE BARRIERS      Spoke with [facility] admissions. They may have a bed open, if TCU is not able to accept.
5/23/19, 3:26 PM    DISCHARGE BARRIERS      Spoke with Latanya's niece, who is in agreement with TCU as first choice. Family shares that ecf preferences, if needed, would be [facility].
5/23/19, 9:36 AM      [facility] day: 2  Location: [facility]/001-A Reason for admit: Fracture of right pelvis, closed, initial encounter ([facility] [address] [S32. 9XXA]   Procedure: no  Treatment Plan of Care: PT/OT. WBAT right leg. Pain management. I&O. N/V checks. PCP: John Cabrera MD  Readmission Risk Score: 26%  Discharge Plan: [facility] admissions coordinator, considering for TCU tomorrow. Latanya told me that her Viola Schwartz is coming today and will stay with her upon discharge from TCU to home. She said that he will transport her to chemo. She gets chemo at Dr Lisa Lindsay office on [facility] weekly on Tuesdays. I updated [facility] admissions coordinator. Latanya told me that she prefers TCU over ECF, but first ECF choice is [facility] ,  said they do not have a bed for her at this time. Viola Schwartz is here. Grandson or one of 5 nieces that normally make sure she gets to chemo will transport her to and from [facility] while on TCU.
5/24/19, 10:43 AM    Discharge plan discussed by  and . Discharge plan reviewed with patient/ family. Patient/ family verbalize understanding of discharge plan and are in agreement with plan. Understanding was demonstrated using the teach back method. Services After Discharge  Services At/After Discharge: Skilled Therapy, OT, PT, Nursing Services   IMM Letter  IMM Letter given to Patient/Family/Significant other/Guardian/POA/by[de-identified] cm  IMM Letter date given[de-identified] 05/24/19  IMM Letter time given[de-identified] 0900     Was discharged/ readmitted to [address] [place] [address] this morning.
DISCHARGE BARRIERS  5/22/19, 10:25 AM    Reason for Referral:  Discharge needs   Mental Status:  Alert, oriented   Decision Making:  Makes own decisions with family support   Family/Social/Home Environment:  Latanya lives at home alone. She has [employer] services for support. Her nieces live nearby and help with care and transportation. Current Services: [employer], including [employer] for aids, and also home delivered meals  Current Equipment:   Payment Source: medicare, medicaid   Concerns or Barriers to Discharge:  May need ecf for rehab  Collabrative List of ECF/HH were provided: declined at this time, unsure of needs   * ecf list provided    Teach Back Method used with patient  regarding care plan and discharge plan  Patient and niece verbalize understanding of the plan of care and contribute to goal setting. Anticipated Needs/Discharge Plan:  Spoke with Jordyn Newton and her niece. Jordyn Newton has [employer] services, including [employer]  Care aids and also home delivered meals. Message left for [employer] , Dolores Monsalve, with update on admission and requesting return call. Jordyn Newton and family are unsure of needs yet at discharge. Jordyn Newton is currently getting chemo, which is scheduled to continue through June.       Electronically signed by MIGEL Saez on 5/22/2019 at 10:25 AM
has never used smokeless tobacco.   PCP: Esmer Echeverria MD  Readmission: no  Readmission Risk Score: 26%    Discharge Planning  Current Residence:  Private Residence  Living Arrangements:  Alone   Support Systems:  Family Members  Current Services PTA:     Potential Assistance Needed:  N/A  Potential Assistance Purchasing Medications:  No  Does patient want to participate in local refill/ meds to beds program?  No  Type of Home Care Services:  None  Patient expects to be discharged to:  home  Expected Discharge date:  05/24/19  Follow Up Appointment: Best Day/ Time: Monday PM    Discharge Plan: Lynne Blanco is  and lives alone in [facility]. Has services at home. Scotty Padgett, working with her and her niece on discharge planning. She is currently getting chemo. Dr Maribel Russo in this morning. He discussed ECF placement.       Evaluation: yes

## 2019-05-25 PROBLEM — S32.9XXA: Status: RESOLVED | Noted: 2019-05-21 | Resolved: 2019-05-25

## 2019-05-25 PROBLEM — Z79.899 ON ANTINEOPLASTIC CHEMOTHERAPY: Status: ACTIVE | Noted: 2019-05-25

## 2019-05-25 PROBLEM — S32.591A CLOSED FRACTURE OF RIGHT INFERIOR PUBIC RAMUS (HCC): Status: RESOLVED | Noted: 2019-05-24 | Resolved: 2019-05-25

## 2019-05-25 LAB
GLUCOSE BLD-MCNC: 104 MG/DL (ref 70–108)
GLUCOSE BLD-MCNC: 142 MG/DL (ref 70–108)

## 2019-05-25 PROCEDURE — 2700000000 HC OXYGEN THERAPY PER DAY

## 2019-05-25 PROCEDURE — 1290000000 HC SEMI PRIVATE OTHER R&B

## 2019-05-25 PROCEDURE — 82948 REAGENT STRIP/BLOOD GLUCOSE: CPT

## 2019-05-25 PROCEDURE — 97110 THERAPEUTIC EXERCISES: CPT

## 2019-05-25 PROCEDURE — 97161 PT EVAL LOW COMPLEX 20 MIN: CPT

## 2019-05-25 PROCEDURE — 2500000003 HC RX 250 WO HCPCS: Performed by: ORTHOPAEDIC SURGERY

## 2019-05-25 PROCEDURE — 6370000000 HC RX 637 (ALT 250 FOR IP): Performed by: ORTHOPAEDIC SURGERY

## 2019-05-25 PROCEDURE — 97530 THERAPEUTIC ACTIVITIES: CPT

## 2019-05-25 RX ORDER — B-COMPLEX WITH VITAMIN C
TABLET ORAL
Refills: 0 | COMMUNITY
Start: 2019-05-21 | End: 2019-12-11 | Stop reason: SDUPTHER

## 2019-05-25 RX ORDER — CLOPIDOGREL BISULFATE 75 MG/1
75 TABLET ORAL DAILY
Status: DISCONTINUED | OUTPATIENT
Start: 2019-06-09 | End: 2019-06-12 | Stop reason: HOSPADM

## 2019-05-25 RX ADMIN — CLOPIDOGREL BISULFATE 75 MG: 75 TABLET ORAL at 08:32

## 2019-05-25 RX ADMIN — ASPIRIN 325 MG: 325 TABLET, DELAYED RELEASE ORAL at 08:32

## 2019-05-25 RX ADMIN — PANTOPRAZOLE SODIUM 40 MG: 40 TABLET, DELAYED RELEASE ORAL at 05:53

## 2019-05-25 RX ADMIN — CETIRIZINE HYDROCHLORIDE 5 MG: 10 TABLET, FILM COATED ORAL at 08:32

## 2019-05-25 RX ADMIN — METOPROLOL TARTRATE 12.5 MG: 25 TABLET ORAL at 08:31

## 2019-05-25 RX ADMIN — HYDROCODONE BITARTRATE AND ACETAMINOPHEN 2 TABLET: 5; 325 TABLET ORAL at 10:48

## 2019-05-25 RX ADMIN — HYDROCODONE BITARTRATE AND ACETAMINOPHEN 2 TABLET: 5; 325 TABLET ORAL at 05:53

## 2019-05-25 RX ADMIN — SERTRALINE 50 MG: 50 TABLET, FILM COATED ORAL at 08:31

## 2019-05-25 RX ADMIN — ISOSORBIDE MONONITRATE 30 MG: 30 TABLET ORAL at 08:31

## 2019-05-25 RX ADMIN — PREDNISONE 2 MG: 1 TABLET ORAL at 08:31

## 2019-05-25 RX ADMIN — LEVOTHYROXINE SODIUM 88 MCG: 88 TABLET ORAL at 05:53

## 2019-05-25 RX ADMIN — MULTIPLE VITAMINS W/ MINERALS TAB 1 TABLET: TAB at 08:31

## 2019-05-25 RX ADMIN — Medication 5 UNITS: at 17:25

## 2019-05-25 RX ADMIN — HYDROCODONE BITARTRATE AND ACETAMINOPHEN 2 TABLET: 5; 325 TABLET ORAL at 18:38

## 2019-05-25 ASSESSMENT — PAIN SCALES - GENERAL
PAINLEVEL_OUTOF10: 9
PAINLEVEL_OUTOF10: 7
PAINLEVEL_OUTOF10: 0
PAINLEVEL_OUTOF10: 10
PAINLEVEL_OUTOF10: 5
PAINLEVEL_OUTOF10: 10

## 2019-05-25 ASSESSMENT — PAIN DESCRIPTION - PAIN TYPE
TYPE: ACUTE PAIN
TYPE: ACUTE PAIN

## 2019-05-25 ASSESSMENT — PAIN DESCRIPTION - ORIENTATION
ORIENTATION: RIGHT
ORIENTATION: RIGHT

## 2019-05-25 ASSESSMENT — PAIN DESCRIPTION - FREQUENCY
FREQUENCY: INTERMITTENT
FREQUENCY: INTERMITTENT

## 2019-05-25 ASSESSMENT — PAIN DESCRIPTION - DESCRIPTORS
DESCRIPTORS: ACHING
DESCRIPTORS: ACHING;DISCOMFORT

## 2019-05-25 ASSESSMENT — PAIN DESCRIPTION - ONSET
ONSET: ON-GOING
ONSET: ON-GOING

## 2019-05-25 ASSESSMENT — PAIN DESCRIPTION - PROGRESSION
CLINICAL_PROGRESSION: NOT CHANGED
CLINICAL_PROGRESSION: NOT CHANGED

## 2019-05-25 ASSESSMENT — PAIN DESCRIPTION - LOCATION
LOCATION: HIP
LOCATION: HIP

## 2019-05-25 NOTE — PROGRESS NOTES
[facility]  INPATIENT PHYSICAL THERAPY  EVALUATION  [facility] SKILLED NURSING UNIT - [id number]    Time In: 0848  Time Out: 3327  Minutes: 38  Timed treatment minutes:  20  Date: 2019  Patient Name: Camille Nava,  Gender:  female        MRN: [id number]  : 1933  (80 y.o.)      Referring Practitioner: Mihai Lara, Ordering: Tay Orantes MD  Diagnosis: superior and inferior pubic ramus fracture, right, initial encounter   Additional Pertinent Hx: The patient is a 80 y.o. female  With multiple medical comorbidities who was admitted to ortho services for treatment of a right pelvic fracture. Patient has a history of breast CA with mets to the liver and is currently undergoing weekly chemotherapy. She completed a treatment yesterday and notes that she usually gets dizzy afterwards. She reports a fall in her home after an episode of dizziness yesterday. She fell onto her right side landing on the right hip. She was unable to bear weight afterwards. She denies hitting her head and no LOC. X-ray shows a right superior and inferior pubic rami fracture. Patient lives alone and is typically independent. She does ambulate with a walker. CT negative for R femur fracture.      Past Medical History:   Diagnosis Date    Anxiety     Blind right eye     Breast cancer metastasized to liver ([address] 05/10/2016    Liver lesion noted     Carotid stenosis      Left ICA 25%    Colostomy in place [facility]) 2016    Degenerative arthritis of cervical spine     GERD (gastroesophageal reflux disease)     Hypercholesteremia     Hypoestrogenism     Hypothyroidism     Lumbago     Lumbosacral spinal stenosis 2019    MDRO (multiple drug resistant organisms) resistance     pt stated cleared    Personal history of cardiac dysrhythmia     Sigmoid diverticulosis     Spondylolisthesis of lumbosacral region     Steroid-induced hyperglycemia     Subclavian artery stenosis ([address]     left    Superior and Inferior Pubic ramus fracture, right, closed, initial encounter ([facility] [address] 05/21/2019    SVT (supraventricular tachycardia) ([facility] [address] 6/07    Temporal arteritis ([facility] [address]     Vertebral artery occlusion     left    Vitamin D deficiency 06/2017     Past Surgical History:   Procedure Laterality Date    CARDIAC CATHETERIZATION  5/07    CATARACT REMOVAL  right 1/08; left 2/08    CHOLECYSTECTOMY  1/03    COLONOSCOPY  11/06    COLOSTOMY  09/04/2018    REVERSAL OF COLOSTOMY    DILATION AND CURETTAGE OF UTERUS      ENDOSCOPY, COLON, DIAGNOSTIC      EYE SURGERY      EYE SURGERY Right 11/06/2017    Dr Lopez in [address]      partial    OTHER SURGICAL HISTORY  05/27/2016    robotic assisted laparoscopic anterior colon resection and end colostomy    [place] OFFICE/OUTPT VISIT,PROCEDURE ONLY N/A 9/4/2018    COLOSTOMY REVERSAL AND PARASTOMAL HERNIA REPAIR performed by Chad Valencia MD at [address] [facility]  2002    UPPER GASTROINTESTINAL ENDOSCOPY  11/06       Restrictions/Precautions:  Weight Bearing, Fall Risk     Right Lower Extremity Weight Bearing: Weight Bearing As Tolerated     Subjective:  Chart Reviewed: Yes  Patient assessed for rehabilitation services?: Yes  Family / Caregiver Present: No  Subjective: Patient resting in chair after getting cleaned up with the nurse, patient agreeable to therapy. General:  Overall Orientation Status: Within Functional Limits  Follows Commands: Within Functional Limits    Vision: Impaired(patient utilizes glasses for reading, states she has a hard time seeing objects at a distance)    Hearing: Within functional limits    Pain:   .      Pre Treatment Pain Screening  Pain at present: 6  Scale Used: Numeric Score  Intervention List: Patient able to continue with treatment  Comments / Details: R leg, during WB or shifting    Social/Functional History:    Lives With: Alone  Type of Home: House  Home Layout: One level  Home Access: Level entry  Home Equipment: Rolling walker, Oxygen     Bathroom Shower/Tub: Tub/Shower unit  Bathroom Toilet: Standard  Bathroom Equipment: Shower chair       ADL Assistance: Independent  Homemaking Assistance: Needs assistance  Ambulation Assistance: Independent  Transfer Assistance: Independent    Active : No     Additional Comments: Pt states amb with RW, O2 at night; has aide services 5 days/week that assists with laundry, showers       Objective:     RLE AROM: WFL    LLE AROM : WFL    Strength RLE: Exception  Comment: not formally tested due to pain, Hip Flexion 2+/5, Knee Extension 3+/5, Ankle Dorsiflexion 3+/5    Strength LLE: WNL  Comment: grossly 4-/5       Balance  Posture: Fair  Sitting - Static: Good  Sitting - Dynamic: Good  Standing - Static: Fair, -  Standing - Dynamic: Poor, +  Comments: Patient requires MINx1 for ambulation, BUE support on RW to maintain balance due to pain during RLE WB    Rolling to Left: Maximum assistance(MAXx1 for RLE movement, required placement of RUE on handrail)  Rolling to Right: Unable to assess(patient with significant pain during ambulation and sitting/scooting on RLE, did not assess R rolling due to WB)  Supine to Sit: Moderate assistance(MODx1 to place LE off EOB, assist with upper body into sitting)  Sit to Supine: Moderate assistance(MODx1 to control descent to bed and assist with LE placement)  Scooting: Contact guard assistance(patient able to complete but requires additional time secondary to pain levels)    Transfers  Sit to Stand: Moderate Assistance, Minimal Assistance(MODx1 from bedside chair, MINx1 from EOB when elevated.  Patient with increased pain during WB on RLE, requiring assist to maintain balance and complete transfer)  Stand to sit: Minimal Assistance(MINx1 to control descent from various surfaces, cueing given for RLE extension to decrease WB through leg )  Bed to Chair: Moderate assistance(MODx1 for transfer, MINx1 during ambulation to maintain balance)  Car Transfer: Unable to assess(secondary to pain levels)  Comment: After all mobility patient required prolonged rest break secondary to pain. Patient able to complete pursed lip breathing with cueing        Ambulation 1  Surface: level tile  Device: Rolling Walker  Assistance: Minimal assistance  Quality of Gait: Slow and guarded due to pain. Decreased B step lengths and decreased B step clearance. Mild instability but no LOB  Distance: 10x2  Comments: further ambulation deferred at this time secondary to fatigue, pain and instability when fatigued        Exercises:  Comments: Seated in bedside chair pt completed B LE ankle pumps, LAQ, marches, hip abd/add, glut set all x10 reps to increase strength for improved functional mobiltiy. Patient required additional time due to pain      Activity Tolerance:  Activity Tolerance: Patient limited by endurance; Patient limited by pain  Activity Tolerance: patient required long rest breaks between all mobility due to pain levels     Treatment Initiated: see above exercises, additional gait training and sitting balance     Assessment: Body structures, Functions, Activity limitations: Decreased functional mobility , Decreased strength, Decreased safe awareness, Decreased balance, Decreased endurance, Increased Pain  Assessment: Patient tolerated well but limited by pain, decreased strength and balance. She requires extended rest breaks between all mobility to decrease pain and improve breathing pattern. She also requires additional assistance during all bed mobility, transfers and ambulation to maintain balance and perform in a timely manner due to pain. She would benefit from continued therapy in order to address these deficits and return her to her PLOF. Prognosis: Good    Clinical Presentation: Low - Stable and Uncomplicated: Patient tolerated well but limited by pain, decreased strength and balance.  She requires extended rest breaks between all mobility to decrease pain and improve breathing pattern. She also requires additional assistance during all bed mobility, transfers and ambulation to maintain balance and perform in a timely manner due to pain. She would benefit from continued therapy in order to address these deficits and return her to her PLOF. Decision Making: High Complexitybased on patient assessment and decision making process of determining plan of care and establishing reasonable expectations for measurable functional outcomes         Discharge Recommendations:  Discharge Recommendations: Continue to assess pending progress    Patient Education:  Patient Education: POC, bed mobility, transfers, gait pattern to offload RLE     Equipment Recommendations:  Equipment Needed: No    Safety:  Type of devices: All fall risk precautions in place, Call light within reach, Chair alarm in place, Nurse notified, Patient at risk for falls, Gait belt, Left in chair    Plan:  Times per week: 6x/wk  Times per day: Daily  Current Treatment Recommendations: Strengthening, Functional Mobility Training, Transfer Training, Balance Training, Gait Training, Stair training, Home Exercise Program, Equipment Evaluation, Education, & procurement, Safety Education & Training, Patient/Caregiver Education & Training, Endurance Training    Goals:  Patient goals : to get stronger  Short term goals  Time Frame for Short term goals: 1 week  Short term goal 1: Pt to transfer supine <--> sit MINx1 to enable pt to get in/out of bed. Short term goal 2: Pt to transfer sit <--> stand CGA for increased functional mobility.   Short term goal 3: Pt to ambulate >15 feet for household ambulation with use of RW and CGA  Short term goal 4: Patient able to stand with F+ balance in order to decrease risk of future falls  Long term goals  Time Frame for Long term goals : 2 weeks  Long term goal 1: Patient will be able to perform bed mobility CGA to get in/out of bed  Long term goal 2: Patient will be able to perform functional transfers SBA to sit in bedside chair  Long term goal 3: Patient will be able to ambulate up to 50 feet with RW and CGA to walk around home safely  Long term goal 4: Patient able to stand with G balance in order to decrease risk of future falls    Evaluation Complexity: Based on the findings of patient history, examination, clinical presentation, and decision making during this evaluation, the evaluation of Luis Milligan  is of low complexity. Evaluation and note completed by Lisa Stone PT.   Addendum of treatment minutes by Mathew Payan PT

## 2019-05-25 NOTE — PLAN OF CARE
Problem: Falls - Risk of:  Goal: Will remain free from falls  Description  Will remain free from falls  Outcome: Ongoing  Note:   Fall precautions in place, up with assist and walker     Problem: Falls - Risk of:  Goal: Absence of physical injury  Description  Absence of physical injury  Outcome: Ongoing  Note:   No injury     Problem: Risk for Impaired Skin Integrity  Goal: Tissue integrity - skin and mucous membranes  Description  Structural intactness and normal physiological function of skin and  mucous membranes. Outcome: Ongoing  Note:   Turn and reposition, wt shifts ,waffle cushion in chair      Problem: Pain:  Goal: Pain level will decrease  Description  Pain level will decrease  Outcome: Ongoing  Note:   Ice pack prn , norco prn , repositioned      Problem: Pain:  Goal: Control of acute pain  Description  Control of acute pain  Outcome: Ongoing  Note:   Administer pain meds as needed      Problem: Pain:  Goal: Control of chronic pain  Description  Control of chronic pain  Outcome: Ongoing  Note:   Prn pain meds norco, ice pack if needed rest and reposition      Problem: Anxiety:  Goal: Level of anxiety will decrease  Description  Level of anxiety will decrease  Outcome: Ongoing  Note:   Calm environment      Problem: Bleeding:  Goal: Will show no signs and symptoms of excessive bleeding  Description  Will show no signs and symptoms of excessive bleeding  Outcome: Ongoing  Note:   No active bleeding. Continues on asa and plavix      Problem: Serum Glucose Level - Abnormal:  Goal: Ability to maintain appropriate glucose levels will improve  Description  Ability to maintain appropriate glucose levels will improve  Outcome: Ongoing  Note:   Daily chems      Problem: Cardiac:  Goal: Ability to maintain vital signs within normal range will improve  Description  Ability to maintain vital signs within normal range will improve  Outcome: Ongoing  Note:   Monitor vs as ordered.       Problem: Mood - Altered:  Goal: Mood stable  Description  Mood stable  Outcome: Ongoing  Note:   Mood stable      Problem: Nutrition  Goal: Optimal nutrition therapy  Outcome: Ongoing  Note:   Monitor intake and output

## 2019-05-26 LAB — GLUCOSE BLD-MCNC: 112 MG/DL (ref 70–108)

## 2019-05-26 PROCEDURE — 82948 REAGENT STRIP/BLOOD GLUCOSE: CPT

## 2019-05-26 PROCEDURE — 6360000002 HC RX W HCPCS: Performed by: ORTHOPAEDIC SURGERY

## 2019-05-26 PROCEDURE — 6370000000 HC RX 637 (ALT 250 FOR IP): Performed by: ORTHOPAEDIC SURGERY

## 2019-05-26 PROCEDURE — 97535 SELF CARE MNGMENT TRAINING: CPT

## 2019-05-26 PROCEDURE — 97166 OT EVAL MOD COMPLEX 45 MIN: CPT

## 2019-05-26 PROCEDURE — 1290000000 HC SEMI PRIVATE OTHER R&B

## 2019-05-26 RX ADMIN — SIMVASTATIN 20 MG: 20 TABLET, FILM COATED ORAL at 22:16

## 2019-05-26 RX ADMIN — SIMVASTATIN 20 MG: 20 TABLET, FILM COATED ORAL at 03:11

## 2019-05-26 RX ADMIN — ALPRAZOLAM 0.5 MG: 0.5 TABLET ORAL at 22:16

## 2019-05-26 RX ADMIN — SERTRALINE 50 MG: 50 TABLET, FILM COATED ORAL at 22:16

## 2019-05-26 RX ADMIN — PANTOPRAZOLE SODIUM 40 MG: 40 TABLET, DELAYED RELEASE ORAL at 07:00

## 2019-05-26 RX ADMIN — ISOSORBIDE MONONITRATE 30 MG: 30 TABLET ORAL at 10:07

## 2019-05-26 RX ADMIN — ENOXAPARIN SODIUM 40 MG: 40 INJECTION SUBCUTANEOUS at 10:08

## 2019-05-26 RX ADMIN — METOPROLOL TARTRATE 12.5 MG: 25 TABLET ORAL at 10:07

## 2019-05-26 RX ADMIN — HYDROCODONE BITARTRATE AND ACETAMINOPHEN 2 TABLET: 5; 325 TABLET ORAL at 22:16

## 2019-05-26 RX ADMIN — HYDROCODONE BITARTRATE AND ACETAMINOPHEN 1 TABLET: 5; 325 TABLET ORAL at 03:11

## 2019-05-26 RX ADMIN — HYDROCODONE BITARTRATE AND ACETAMINOPHEN 1 TABLET: 5; 325 TABLET ORAL at 07:00

## 2019-05-26 RX ADMIN — METOPROLOL TARTRATE 12.5 MG: 25 TABLET ORAL at 22:16

## 2019-05-26 RX ADMIN — METOPROLOL TARTRATE 12.5 MG: 25 TABLET ORAL at 03:11

## 2019-05-26 RX ADMIN — MULTIPLE VITAMINS W/ MINERALS TAB 1 TABLET: TAB at 10:06

## 2019-05-26 RX ADMIN — ALPRAZOLAM 0.5 MG: 0.5 TABLET ORAL at 03:12

## 2019-05-26 RX ADMIN — SERTRALINE 50 MG: 50 TABLET, FILM COATED ORAL at 10:06

## 2019-05-26 RX ADMIN — CETIRIZINE HYDROCHLORIDE 5 MG: 10 TABLET, FILM COATED ORAL at 10:08

## 2019-05-26 RX ADMIN — LEVOTHYROXINE SODIUM 88 MCG: 88 TABLET ORAL at 07:00

## 2019-05-26 RX ADMIN — HYDROCODONE BITARTRATE AND ACETAMINOPHEN 2 TABLET: 5; 325 TABLET ORAL at 11:51

## 2019-05-26 RX ADMIN — PREDNISONE 2 MG: 1 TABLET ORAL at 10:06

## 2019-05-26 RX ADMIN — SERTRALINE 50 MG: 50 TABLET, FILM COATED ORAL at 03:11

## 2019-05-26 ASSESSMENT — PAIN SCALES - GENERAL
PAINLEVEL_OUTOF10: 8
PAINLEVEL_OUTOF10: 9
PAINLEVEL_OUTOF10: 0
PAINLEVEL_OUTOF10: 7
PAINLEVEL_OUTOF10: 9
PAINLEVEL_OUTOF10: 10

## 2019-05-26 ASSESSMENT — PAIN DESCRIPTION - LOCATION: LOCATION: HIP

## 2019-05-26 ASSESSMENT — PAIN DESCRIPTION - ORIENTATION: ORIENTATION: RIGHT

## 2019-05-26 ASSESSMENT — PAIN DESCRIPTION - PAIN TYPE: TYPE: ACUTE PAIN

## 2019-05-26 NOTE — PLAN OF CARE
Problem: Bleeding:  Goal: Will show no signs and symptoms of excessive bleeding  Description  Will show no signs and symptoms of excessive bleeding  5/25/2019 2011 by Sherren Ducking, RN  Outcome: Ongoing  5/25/2019 1157 by Keith Loera RN  Outcome: Ongoing  Note:   No active bleeding. Continues on asa and plavix     This nurse received call from Dr. Liset DUNAWAY and orders received for Lovenox x 2 weeks starting tomorrow then resume aspirin and plavix.     Lizz Whaley RN)

## 2019-05-26 NOTE — PROGRESS NOTES
Sandra Copeland 60  INPATIENT OCCUPATIONAL THERAPY  [facility] SKILLED NURSING UNIT  EVALUATION    Time:  Time In: 803  Time Out: 0848  Timed Code Treatment Minutes: 30 Minutes  Minutes: 45          Date: 2019  Patient Name: Rochelle Wiggins,   Gender: female      MRN: [id number]  : 1933  (80 y.o.)  Referring Practitioner: Dr. Gokul Lawson, Dr. Ethan Huerta Attending  Diagnosis: pubic ramus fracture RIght, closed  Additional Pertinent Hx: The patient is a 80 y.o. female  With multiple medical comorbidities who was admitted to ortho services for treatment of a right pelvic fracture. Patient has a history of breast CA with mets to the liver and is currently undergoing weekly chemotherapy. She completed a treatment yesterday and notes that she usually gets dizzy afterwards. She reports a fall in her home after an episode of dizziness yesterday. She fell onto her right side landing on the right hip. She was unable to bear weight afterwards. She denies hitting her head and no LOC. X-ray shows a right superior and inferior pubic rami fracture. Patient lives alone and is typically independent. She does ambulate with a walker. CT negative for R femur fracture.     Restrictions/Precautions:  Weight Bearing, Fall Risk     Right Lower Extremity Weight Bearing: Weight Bearing As Tolerated           Past Medical History:   Diagnosis Date    Anxiety     Blind right eye     Breast cancer metastasized to liver ([address] 05/10/2016    Liver lesion noted     Carotid stenosis      Left ICA 25%    Colostomy in place [facility]) 2016    Degenerative arthritis of cervical spine     GERD (gastroesophageal reflux disease)     Hypercholesteremia     Hypoestrogenism     Hypothyroidism     Lumbago     Lumbosacral spinal stenosis 2019    MDRO (multiple drug resistant organisms) resistance     pt stated cleared    Personal history of cardiac dysrhythmia     Sigmoid diverticulosis     Needs assistance(aide assists with housekeeping and grocery shopping.)       Ambulation Assistance: Independent  Transfer Assistance: Independent    Active : No     Additional Comments: Pt states amb with RW, O2 at night; has aide services 5 days/week that assists with laundry, showers and grocery shopping. Objective                 LUE AROM (degrees)  LUE AROM : WFL          RUE AROM (degrees)  RUE AROM : WFL       LUE Strength  LUE Strength Comment: min generalized weakness throughout 4/5         ADL  Feeding: Setup  Grooming: Minimal assistance  UE Bathing: Minimal assistance  LE Bathing: Maximum assistance  UE Dressing: Minimal assistance  LE Dressing: Maximum assistance  Toileting: Maximal assistance     Bed mobility  Rolling to Left: Moderate assistance  Supine to Sit: Moderate assistance  Scooting: Minimal assistance  Comment: inreased time due to high pain levels. Transfers  Sit to stand: Minimal assistance  Stand to sit: Minimal assistance  Toilet Transfers  Equipment Used: Standard bedside commode  Toilet Transfer: Minimal assistance    Balance  Sitting Balance: Stand by assistance  Standing Balance: Minimal assistance           Functional Mobility  Functional - Mobility Device: Rolling Walker  Assist Level: Minimal assistance  Functional Mobility Comments: X 2 feet to BS X 4 feet from [facility] to recliner chair, difficulty bearing weight through RLE, min cues needed to sequence and for walker safety. increased time needed   Apparatus Needs: O2(2 liters)                  Activity Tolerance:  Activity Tolerance: Patient Tolerated treatment well, Patient limited by fatigue, Patient limited by pain    Treatment Initiated:  BADL completed x 35 min as detailed above with mod increased time due to pain levels. PT ambulated in room x 5 feet with CGA to min A and RW. To ambulate to the chair. Assisted pt to reposition in the chair for comfort at EOS.      Assessment:  Assessment: Pt requires increased assitance with ADLs, trnasfers and mobility due to a recent fall and pelvic fracture. She requires min A for standing balance and short distance ambulation, Sh requires min cues for safety and attention to ADL tasks. Pt will continue to benefit from skilled OT services to address deficits and increase safeyt and indep wtih ADL tasks to return to PLOF  Performance deficits / Impairments: Decreased functional mobility , Decreased ADL status, Decreased endurance, Decreased strength, Decreased balance, Decreased safe awareness, Decreased high-level IADLs  Prognosis: Good    Clinical Decision Making: Clinical Decision making was of Moderate Complexity as the result of analysis of data from a detailed assessment, a consideration of several treatment options, the presence of comorbidities affecting the plan of care and the need for minimal to moderate modifications or assistance required to complete the evaluation. Discharge Recommendations:  Continue to assess pending progress    Patient Education:  Patient Education: OT POC, safe mobility, attention to task    Equipment Recommendations: Other: Pt has a shower chair. May benefit from [facility]. Safety:  Safety Devices in place: Yes  Type of devices: All fall risk precautions in place, Nurse notified, Gait belt, Left in chair, Call light within reach, Chair alarm in place       Plan:  Times per week: 6x  Current Treatment Recommendations: Strengthening, Balance Training, Endurance Training, Functional Mobility Training, Patient/Caregiver Education & Training, Self-Care / ADL, Safety Education & Training, Home Management Training, Equipment Evaluation, Education, & procurement    Goals:  Patient goals : decrease pain to be able to go home soon.     Short term goals  Time Frame for Short term goals: 1 week  Short term goal 1: Pt will complete ADL routine with no cues for adapted tech and LHAE PRN to increase independende in self care tasks   Short term goal 2: Pt will complete functional ambulation to and from the BR with CGA and no cues for RW safety to increase independence in toileting routine  Short term goal 3: Pt will complete 1 step light homemaking tasks with no > min cues for attention to task to increase independence in clothng retrieval.   Short term goal 4: Pt will complete standing ADL with CGA with 1 UE release for > 3 minutes and no > 1 vc to attend to task to increase ease with sink side grooming  Short term goal 5: Pt will demo indep wtih BUE light strenthenng HEP  for improved UB strength and activity tolerance needed for ease with funcitonal transfers and ADLs  Long term goals  Time Frame for Long term goals : 3-4 weeks  Long term goal 1: PT will complete ADL routine with no cues for safe and adapted tech with S for completion to increase independence in self care tasks  Long term goal 2: PT will complete 2 step homemaking tasks with S and no cues for problem solving or attention to task to be able to prepare a simple meal     Evaluation Complexity: Based on the findings of patient history, examination, clinical presentation, and decision making during this evaluation, this patient is of medium complexity.

## 2019-05-26 NOTE — PLAN OF CARE
Problem: Serum Glucose Level - Abnormal:  Goal: Ability to maintain appropriate glucose levels will improve  Description  Ability to maintain appropriate glucose levels will improve  Outcome: Ongoing       Patient spontaneously awakened during the night to go to bathroom. When this nurse into assist, patient states \"Did I miss dinner? \"  This nurse made patient aware of the time but did offer snack of peanut butter and jelly to which patient accepted and ate the entire sandwich. Patient offered ice to right hip/pelvic region but refused at this time. Repositioned. Medications administered. Call light within patient reach.

## 2019-05-27 PROCEDURE — 94640 AIRWAY INHALATION TREATMENT: CPT

## 2019-05-27 PROCEDURE — 1290000000 HC SEMI PRIVATE OTHER R&B

## 2019-05-27 PROCEDURE — 6370000000 HC RX 637 (ALT 250 FOR IP): Performed by: ORTHOPAEDIC SURGERY

## 2019-05-27 PROCEDURE — 6370000000 HC RX 637 (ALT 250 FOR IP): Performed by: EMERGENCY MEDICINE

## 2019-05-27 PROCEDURE — 6360000002 HC RX W HCPCS: Performed by: ORTHOPAEDIC SURGERY

## 2019-05-27 PROCEDURE — 94760 N-INVAS EAR/PLS OXIMETRY 1: CPT

## 2019-05-27 PROCEDURE — 2700000000 HC OXYGEN THERAPY PER DAY

## 2019-05-27 RX ORDER — AZITHROMYCIN 250 MG/1
500 TABLET, FILM COATED ORAL DAILY
Status: COMPLETED | OUTPATIENT
Start: 2019-05-27 | End: 2019-05-27

## 2019-05-27 RX ORDER — IPRATROPIUM BROMIDE AND ALBUTEROL SULFATE 2.5; .5 MG/3ML; MG/3ML
1 SOLUTION RESPIRATORY (INHALATION)
Status: DISCONTINUED | OUTPATIENT
Start: 2019-05-27 | End: 2019-06-12 | Stop reason: HOSPADM

## 2019-05-27 RX ORDER — AZITHROMYCIN 250 MG/1
250 TABLET, FILM COATED ORAL DAILY
Status: COMPLETED | OUTPATIENT
Start: 2019-05-28 | End: 2019-05-31

## 2019-05-27 RX ADMIN — SIMVASTATIN 20 MG: 20 TABLET, FILM COATED ORAL at 21:57

## 2019-05-27 RX ADMIN — ISOSORBIDE MONONITRATE 30 MG: 30 TABLET ORAL at 09:05

## 2019-05-27 RX ADMIN — IPRATROPIUM BROMIDE AND ALBUTEROL SULFATE 1 AMPULE: .5; 3 SOLUTION RESPIRATORY (INHALATION) at 17:42

## 2019-05-27 RX ADMIN — ENOXAPARIN SODIUM 40 MG: 40 INJECTION SUBCUTANEOUS at 09:06

## 2019-05-27 RX ADMIN — SERTRALINE 50 MG: 50 TABLET, FILM COATED ORAL at 21:57

## 2019-05-27 RX ADMIN — LEVOTHYROXINE SODIUM 88 MCG: 88 TABLET ORAL at 06:17

## 2019-05-27 RX ADMIN — ALPRAZOLAM 0.5 MG: 0.5 TABLET ORAL at 15:23

## 2019-05-27 RX ADMIN — METOPROLOL TARTRATE 12.5 MG: 25 TABLET ORAL at 09:05

## 2019-05-27 RX ADMIN — AZITHROMYCIN 500 MG: 250 TABLET, FILM COATED ORAL at 14:13

## 2019-05-27 RX ADMIN — PREDNISONE 2 MG: 1 TABLET ORAL at 09:05

## 2019-05-27 RX ADMIN — METOPROLOL TARTRATE 12.5 MG: 25 TABLET ORAL at 21:57

## 2019-05-27 RX ADMIN — MULTIPLE VITAMINS W/ MINERALS TAB 1 TABLET: TAB at 09:05

## 2019-05-27 RX ADMIN — IPRATROPIUM BROMIDE AND ALBUTEROL SULFATE 1 AMPULE: .5; 3 SOLUTION RESPIRATORY (INHALATION) at 11:56

## 2019-05-27 RX ADMIN — PANTOPRAZOLE SODIUM 40 MG: 40 TABLET, DELAYED RELEASE ORAL at 06:17

## 2019-05-27 RX ADMIN — SERTRALINE 50 MG: 50 TABLET, FILM COATED ORAL at 09:05

## 2019-05-27 RX ADMIN — HYDROCODONE BITARTRATE AND ACETAMINOPHEN 2 TABLET: 5; 325 TABLET ORAL at 14:14

## 2019-05-27 RX ADMIN — ALPRAZOLAM 0.5 MG: 0.5 TABLET ORAL at 21:57

## 2019-05-27 RX ADMIN — HYDROCODONE BITARTRATE AND ACETAMINOPHEN 1 TABLET: 5; 325 TABLET ORAL at 21:57

## 2019-05-27 RX ADMIN — CETIRIZINE HYDROCHLORIDE 5 MG: 10 TABLET, FILM COATED ORAL at 09:05

## 2019-05-27 RX ADMIN — HYDROCODONE BITARTRATE AND ACETAMINOPHEN 1 TABLET: 5; 325 TABLET ORAL at 06:17

## 2019-05-27 ASSESSMENT — PAIN DESCRIPTION - LOCATION: LOCATION: HIP

## 2019-05-27 ASSESSMENT — PAIN DESCRIPTION - ORIENTATION: ORIENTATION: RIGHT

## 2019-05-27 ASSESSMENT — PAIN DESCRIPTION - PAIN TYPE: TYPE: ACUTE PAIN

## 2019-05-27 ASSESSMENT — PAIN SCALES - GENERAL
PAINLEVEL_OUTOF10: 10
PAINLEVEL_OUTOF10: 6
PAINLEVEL_OUTOF10: 6
PAINLEVEL_OUTOF10: 10

## 2019-05-27 NOTE — PROGRESS NOTES
Family Medicine Progress Notes/Coverage    Today's Date: 5/27/19  [id number]-71/071-A  Medical Record # [id number]  Account # [de-identified]      Ms. Rod admitted on 5/24/2019        Subjective / Interval History :     + BM x 2 today  Pain is getting better  + coughing still with yellow phlem  Reviewed notes, consults, laboratory and radiology results,    Objective:       Physical Exam:  Patient Vitals for the past 24 hrs:   BP Temp Temp src Pulse Resp SpO2   05/27/19 0512 -- -- -- -- -- 93 %   05/26/19 2010 (!) 115/55 97 °F (36.1 °C) Oral 67 20 92 %   05/26/19 1000 (!) 120/58 98.8 °F (37.1 °C) Oral 66 18 92 %       General Appearance:  Alert, cooperative, no distress, appears stated age   HEENT:  Neck: wnl     Chest/Lungs:  Heart: + wheezing  RRR   Abdomen:  Back: soft   Extremities:  Neurological Exam: No edema  WNL       Assessment:     Admitting Diagnosis:    Pubic ramus fracture, right, closed, initial encounter ([facility] [S32.591A]  Closed fracture of right inferior pubic ramus, initial encounter [facility]) [place] Problems    Diagnosis Date Noted    Superior and Inferior Pubic ramus fracture, right, closed, initial encounter [facility]) [place] 05/21/2019     Priority: High    Anxiety [F41.9] 06/29/2016     Priority: High    Breast cancer metastasized to liver ([facility] [C50.919, C78.7] 06/08/2016     Priority: High    On antineoplastic chemotherapy [Z79.899] 05/25/2019     Priority: Medium    Lumbosacral spinal stenosis [M48.07] 05/01/2019     Priority: Medium    Personal history of cardiac dysrhythmia [Z86.79]      Priority: Medium    Current chronic use of systemic steroids [Z79.52] 09/04/2018     Priority: Medium    H/O: CVA (cerebrovascular accident) [Z86.73] 11/06/2017     Priority: Medium    Coronary artery disease involving native coronary artery of native heart without angina pectoris [I25.10] 11/06/2017     Priority: Medium    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]      Priority: Medium    Hypothyroidism due to acquired atrophy of thyroid [E03.4] 03/03/2016     Priority: Medium    Hypercholesteremia [E78.00]      Priority: Medium    Temporal arteritis ([facility] [M31.6]      Priority: Medium    GERD (gastroesophageal reflux disease) [K21.9]      Priority: Medium       Plan:     Discussed plans with the nursing staff  Zithromax and Douneb  May stop oxygen if saturation is normal w/ or w/o exercise  Labs on 5/29/19    Medications, Laboratories and Imaging results:    Scheduled Meds:   azithromycin  500 mg Oral Daily    Followed by   Andrés Edmonds ON 5/28/2019] azithromycin  250 mg Oral Daily    ipratropium-albuterol  1 ampule Inhalation Q6H WA    [START ON 6/9/2019] clopidogrel  75 mg Oral Daily    [START ON 6/9/2019] aspirin  325 mg Oral Daily    enoxaparin  40 mg Subcutaneous Daily    cetirizine  5 mg Oral Daily    isosorbide mononitrate  30 mg Oral Daily    levothyroxine  88 mcg Oral Daily    metoprolol tartrate  12.5 mg Oral BID    pantoprazole  40 mg Oral QAM AC    ppd   Does not apply Weekly    predniSONE  2 mg Oral Daily    sertraline  50 mg Oral BID    simvastatin  20 mg Oral Nightly    therapeutic multivitamin-minerals  1 tablet Oral Daily    tuberculin  5 Units Intradermal Weekly     Continuous Infusions:  PRN Meds:acetaminophen, HYDROcodone 5 mg - acetaminophen **OR** HYDROcodone 5 mg - acetaminophen, magnesium hydroxide, ALPRAZolam    Imaging:    Lab Review :    Lab Results   Component Value Date    WBC 8.8 05/24/2019    HGB 9.8 (L) 05/24/2019    HCT 33.6 (L) 05/24/2019    MCV 93.6 05/24/2019     (L) 05/24/2019     Lab Results   Component Value Date    CREATININE 0.7 05/21/2019    BUN 17 05/21/2019     05/21/2019    K 4.6 05/21/2019     05/21/2019    CO2 20 (L) 05/21/2019     Lab Results   Component Value Date    CKTOTAL 41 10/16/2016

## 2019-05-27 NOTE — PROGRESS NOTES
Clinical Pharmacy Note  Pharmacy Antimicrobial Monitoring    Current antibiotic(s): Z-vargas    Total days of antibiotics: 1    Indication/Diagnosis: + coughing still with yellow phlem    Patient chart reviewed for signs/symptoms of infection: Yes    Signs/symptoms: cough    BP (!) 107/53   Pulse 67   Temp 96 °F (35.6 °C) (Oral)   Resp 16   Ht 5' 2\" (1.575 m)   Wt 149 lb (67.6 kg)   LMP  (LMP Unknown)   SpO2 92%   BMI 27.25 kg/m²   Lab Results   Component Value Date    WBC 8.8 05/24/2019       Cultures:none    Recommended stop date: 5/31/19, already ordered    Prescriber contacted: N/A    Recommendations accepted: [facility] 5/27/2019 3:33 PM

## 2019-05-27 NOTE — PROGRESS NOTES
Patient removed from oxygen this morning able to maintain sats above 90 percent. Dr. Aram Packer stated she is not on oxygen at home. He states the order for home oxygen is from an old surgery.  She is not SOB or complaining at all without it. will continue to monitor her throughout the shift

## 2019-05-28 LAB — GLUCOSE BLD-MCNC: 104 MG/DL (ref 70–108)

## 2019-05-28 PROCEDURE — 97110 THERAPEUTIC EXERCISES: CPT

## 2019-05-28 PROCEDURE — 97530 THERAPEUTIC ACTIVITIES: CPT

## 2019-05-28 PROCEDURE — 94640 AIRWAY INHALATION TREATMENT: CPT

## 2019-05-28 PROCEDURE — 6360000002 HC RX W HCPCS: Performed by: ORTHOPAEDIC SURGERY

## 2019-05-28 PROCEDURE — 82948 REAGENT STRIP/BLOOD GLUCOSE: CPT

## 2019-05-28 PROCEDURE — 97535 SELF CARE MNGMENT TRAINING: CPT

## 2019-05-28 PROCEDURE — 6370000000 HC RX 637 (ALT 250 FOR IP): Performed by: ORTHOPAEDIC SURGERY

## 2019-05-28 PROCEDURE — 6370000000 HC RX 637 (ALT 250 FOR IP): Performed by: EMERGENCY MEDICINE

## 2019-05-28 PROCEDURE — 97116 GAIT TRAINING THERAPY: CPT

## 2019-05-28 PROCEDURE — 1290000000 HC SEMI PRIVATE OTHER R&B

## 2019-05-28 RX ADMIN — HYDROCODONE BITARTRATE AND ACETAMINOPHEN 2 TABLET: 5; 325 TABLET ORAL at 11:01

## 2019-05-28 RX ADMIN — AZITHROMYCIN 250 MG: 250 TABLET, FILM COATED ORAL at 09:10

## 2019-05-28 RX ADMIN — HYDROCODONE BITARTRATE AND ACETAMINOPHEN 2 TABLET: 5; 325 TABLET ORAL at 20:21

## 2019-05-28 RX ADMIN — IPRATROPIUM BROMIDE AND ALBUTEROL SULFATE 1 AMPULE: .5; 3 SOLUTION RESPIRATORY (INHALATION) at 08:27

## 2019-05-28 RX ADMIN — SIMVASTATIN 20 MG: 20 TABLET, FILM COATED ORAL at 20:21

## 2019-05-28 RX ADMIN — ALPRAZOLAM 0.5 MG: 0.5 TABLET ORAL at 09:10

## 2019-05-28 RX ADMIN — IPRATROPIUM BROMIDE AND ALBUTEROL SULFATE 1 AMPULE: .5; 3 SOLUTION RESPIRATORY (INHALATION) at 13:43

## 2019-05-28 RX ADMIN — HYDROCODONE BITARTRATE AND ACETAMINOPHEN 2 TABLET: 5; 325 TABLET ORAL at 06:30

## 2019-05-28 RX ADMIN — SERTRALINE 50 MG: 50 TABLET, FILM COATED ORAL at 20:21

## 2019-05-28 RX ADMIN — ISOSORBIDE MONONITRATE 30 MG: 30 TABLET ORAL at 09:10

## 2019-05-28 RX ADMIN — METOPROLOL TARTRATE 12.5 MG: 25 TABLET ORAL at 09:10

## 2019-05-28 RX ADMIN — IPRATROPIUM BROMIDE AND ALBUTEROL SULFATE 1 AMPULE: .5; 3 SOLUTION RESPIRATORY (INHALATION) at 17:57

## 2019-05-28 RX ADMIN — PREDNISONE 2 MG: 1 TABLET ORAL at 09:10

## 2019-05-28 RX ADMIN — MULTIPLE VITAMINS W/ MINERALS TAB 1 TABLET: TAB at 09:10

## 2019-05-28 RX ADMIN — CETIRIZINE HYDROCHLORIDE 5 MG: 10 TABLET, FILM COATED ORAL at 09:09

## 2019-05-28 RX ADMIN — ENOXAPARIN SODIUM 40 MG: 40 INJECTION SUBCUTANEOUS at 09:10

## 2019-05-28 RX ADMIN — LEVOTHYROXINE SODIUM 88 MCG: 88 TABLET ORAL at 06:30

## 2019-05-28 RX ADMIN — METOPROLOL TARTRATE 12.5 MG: 25 TABLET ORAL at 20:21

## 2019-05-28 RX ADMIN — PANTOPRAZOLE SODIUM 40 MG: 40 TABLET, DELAYED RELEASE ORAL at 06:30

## 2019-05-28 RX ADMIN — SERTRALINE 50 MG: 50 TABLET, FILM COATED ORAL at 09:10

## 2019-05-28 ASSESSMENT — PAIN DESCRIPTION - ONSET: ONSET: ON-GOING

## 2019-05-28 ASSESSMENT — PAIN DESCRIPTION - PROGRESSION
CLINICAL_PROGRESSION: NOT CHANGED
CLINICAL_PROGRESSION: NOT CHANGED
CLINICAL_PROGRESSION: GRADUALLY IMPROVING
CLINICAL_PROGRESSION: NOT CHANGED

## 2019-05-28 ASSESSMENT — PAIN DESCRIPTION - ORIENTATION
ORIENTATION: RIGHT
ORIENTATION: RIGHT

## 2019-05-28 ASSESSMENT — PAIN DESCRIPTION - FREQUENCY
FREQUENCY: INTERMITTENT
FREQUENCY: INTERMITTENT

## 2019-05-28 ASSESSMENT — PAIN SCALES - GENERAL
PAINLEVEL_OUTOF10: 0
PAINLEVEL_OUTOF10: 9
PAINLEVEL_OUTOF10: 10
PAINLEVEL_OUTOF10: 9
PAINLEVEL_OUTOF10: 10
PAINLEVEL_OUTOF10: 0

## 2019-05-28 ASSESSMENT — PAIN DESCRIPTION - PAIN TYPE
TYPE: ACUTE PAIN
TYPE: ACUTE PAIN

## 2019-05-28 ASSESSMENT — PAIN DESCRIPTION - DESCRIPTORS
DESCRIPTORS: ACHING;DISCOMFORT;SHARP
DESCRIPTORS: ACHING;SORE

## 2019-05-28 ASSESSMENT — PAIN DESCRIPTION - LOCATION
LOCATION: ABDOMEN;HIP
LOCATION: RIB CAGE;HIP

## 2019-05-28 NOTE — PROGRESS NOTES
[address] [address]  [address]  Occupational Therapy  Daily Note  Time:  Time In: 1120  Time Out: 1220  Timed Code Treatment Minutes: 60 Minutes  Minutes: 60        Date: 2019  Patient Name: Sita Levine,   Gender: female      Room: -71/071-A  MRN: [id number]  : 1933  (80 y.o.)  Referring Practitioner: Dr. Genesis Silva, Dr. Gm Huerta Attending  Diagnosis: pubic ramus fracture RIght, closed  Additional Pertinent Hx: The patient is a 80 y.o. female  With multiple medical comorbidities who was admitted to ortho services for treatment of a right pelvic fracture. Patient has a history of breast CA with mets to the liver and is currently undergoing weekly chemotherapy. She completed a treatment yesterday and notes that she usually gets dizzy afterwards. She reports a fall in her home after an episode of dizziness yesterday. She fell onto her right side landing on the right hip. She was unable to bear weight afterwards. She denies hitting her head and no LOC. X-ray shows a right superior and inferior pubic rami fracture. Patient lives alone and is typically independent. She does ambulate with a walker. CT negative for R femur fracture. Restrictions/Precautions:  Restrictions/Precautions: Weight Bearing, Fall Risk  Right Lower Extremity Weight Bearing: Weight Bearing As Tolerated    SUBJECTIVE: Pt resting in chair and agreeable to OT treatment. Pt states she feels very tired this morning. PAIN: 3/10: R hip    COGNITION: decreased safety and insight, decreased problem solving    ADL:   [address] standing at sink to wash hands with cues for locating soap and paper towels. Setup assist to comb hair while seated in chair  Lower Extremity Dressing: Moderate Assistance. doffing and donning pants and undergarment  Toileting: Minimal Assistance. for clothing management  Toilet Transfer: [address] from RTS at slow pace.   Pt requires extra time to complete ADLs. BALANCE:  Sitting Balance:  Stand By Assistance  Standing Balance: Contact Guard Assistance    TRANSFERS:  Sit to Stand:  Contact Guard Assistance. from chair at slow pace with vcs for hand placement and safe tech  Stand to Sit: [employer]. to chair at slow pace with vcs for hand placement and safe tech    FUNCTIONAL MOBILITY:  Assistive Device: Rolling Walker  Assist Level:  Contact Guard Assistance. Pt ambulated to/from bathroom at slow pace with no LOB     ADDITIONAL ACTIVITIES:  Pt completed B UE AROM HEP using mod resistance exercise band in all planes in available range x 2 sets of 10 reps at slow pace with vcs and rest breaks needed. Pt required verbal, visual and tactile cues to complete HEP. Exs completed to increase strength and activity tolerance for toilet transfers and ADLs. ASSESSMENT:  Activity Tolerance:  Patient tolerance of  treatment: fair. Pt limited by pain      Discharge Recommendations: Patient would benefit from continued therapy after discharge  Equipment Recommendations: Other: Pt would benefit from new shower chair as hers is approx 8 yrs old, not sturdy and screws are loosening. May benefit from [facility] and [facility].   Plan: Times per week: 6x  Current Treatment Recommendations: Strengthening, Balance Training, Endurance Training, Functional Mobility Training, Patient/Caregiver Education & Training, Self-Care / ADL, Safety Education & Training, Home Management Training, Equipment Evaluation, Education, & procurement    Patient Education  Patient Education: safety with transfers and ambulation, ADLs, role of OT    Goals  Short term goals  Time Frame for Short term goals: 1 week  Short term goal 1: Pt will complete ADL routine with no cues for adapted tech and LHAE PRN to increase independende in self care tasks   Short term goal 2: Pt will complete functional ambulation to and from the BR with CGA and no cues for RW safety to increase independence in toileting routine  Short term goal 3: Pt will complete 1 step light homemaking tasks with no > min cues for attention to task to increase independence in clothng retrieval.   Short term goal 4: Pt will complete standing ADL with CGA with 1 UE release for > 3 minutes and no > 1 vc to attend to task to increase ease with sink side grooming  Short term goal 5: Pt will demo indep wtih BUE light strenthenng HEP  for improved UB strength and activity tolerance needed for ease with funcitonal transfers and ADLs  Long term goals  Time Frame for Long term goals : 3-4 weeks  Long term goal 1: PT will complete ADL routine with no cues for safe and adapted tech with S for completion to increase independence in self care tasks  Long term goal 2: PT will complete 2 step homemaking tasks with S and no cues for problem solving or attention to task to be able to prepare a simple meal     Following session, patient left in safe position with all fall risk precautions in place.

## 2019-05-28 NOTE — PLAN OF CARE
Problem: Impaired respiratory status  Goal: Clear lung sounds  Outcome: Ongoing   Continue therapy to improve lung sounds.

## 2019-05-28 NOTE — PROGRESS NOTES
stenosis ([address]     left    Superior and Inferior Pubic ramus fracture, right, closed, initial encounter ([address] 05/21/2019    SVT (supraventricular tachycardia) ([address] 6/07    Temporal arteritis ([address]     Vertebral artery occlusion     left    Vitamin D deficiency 06/2017     Past Surgical History:   Procedure Laterality Date    CARDIAC CATHETERIZATION  5/07    CATARACT REMOVAL  right 1/08; left 2/08    CHOLECYSTECTOMY  1/03    COLONOSCOPY  11/06    COLOSTOMY  09/04/2018    REVERSAL OF COLOSTOMY    DILATION AND CURETTAGE OF UTERUS      ENDOSCOPY, COLON, DIAGNOSTIC      EYE SURGERY      EYE SURGERY Right 11/06/2017    Dr Lopez in [address]      partial    OTHER SURGICAL HISTORY  05/27/2016    robotic assisted laparoscopic anterior colon resection and end colostomy    MS OFFICE/OUTPT VISIT,PROCEDURE ONLY N/A 9/4/2018    COLOSTOMY REVERSAL AND PARASTOMAL HERNIA REPAIR performed by Rickey Carr MD at [address] [facility]  2002    UPPER GASTROINTESTINAL ENDOSCOPY  11/06       Restrictions/Precautions:  Weight Bearing, Fall Risk     Right Lower Extremity Weight Bearing: Weight Bearing As Tolerated                      Prior Level of Function:  ADL Assistance: Needs assistance(HH aide assists pt 5 days a week x 2 hours.)  Homemaking Assistance: Needs assistance(aide assists with housekeeping and grocery shopping.)  Ambulation Assistance: Independent  Transfer Assistance: Independent  Additional Comments: Pt states amb with RW, O2 at night; has aide services 5 days/week that assists with laundry, showers and grocery shopping. Subjective:  Response To Previous Treatment: Patient with no complaints from previous session. Family / Caregiver Present: No  Comments: pt. very motivated and alert initially for therapy however as session went on , pt. was having a very difficult time keeping her eyes opened, freq.  rests and alot of vc's neededx  Subjective: Patient resting in

## 2019-05-28 NOTE — PLAN OF CARE
Problem: Falls - Risk of:  Goal: Will remain free from falls  Description  Will remain free from falls  Outcome: Ongoing  Pt will remain free of falls. Problem: Risk for Impaired Skin Integrity  Goal: Tissue integrity - skin and mucous membranes  Description  Structural intactness and normal physiological function of skin and  mucous membranes. Outcome: Ongoing  Skin assessment every shift. Problem: Pain:  Goal: Pain level will decrease  Description  Pain level will decrease  Outcome: Ongoing  Reposition frequently to alleviate pain. Problem: Anxiety:  Goal: Level of anxiety will decrease  Description  Level of anxiety will decrease  Outcome: Ongoing  Redirect when anxiety becomes noticeable prior to medicating. Problem: Bleeding:  Goal: Will show no signs and symptoms of excessive bleeding  Description  Will show no signs and symptoms of excessive bleeding  Outcome: Ongoing  Pt will have no active bleeding. Problem: Cardiac:  Goal: Ability to maintain vital signs within normal range will improve  Description  Ability to maintain vital signs within normal range will improve  Outcome: Ongoing     Problem: Impaired respiratory status  Goal: Clear lung sounds  5/28/2019 0830 by Alexis Silver RCP  Outcome: Ongoing  Encourage use of incentive spirometry.

## 2019-05-28 NOTE — PROGRESS NOTES
Patient Name: Neeraj Tapia        MRN: [id number]    : 1933  (80 y.o.)  Gender: female   Principal Problem: Pubic ramus fracture, right, closed, initial encounter [facility])    Section F - Preferences for Customary Routine Activities   . Should Interview for Daily and Activity Preferences be Conducted? - Attempt to interview all residents able to communicate. If resident is unable to complete, attempt to complete interview with family member or significant other. Enter Code    1 0. No (resident is rarely/never understood and family/significant other not available) à Skip to and complete , Staff Assessment of Daily and Activity Preferences  1. Yes à Continue to Gurpreet Vidhi for Daily Preferences   . Interview for Daily Preferences  Show resident the response options and say: While you are in this facility           [place] - Very Important  2 - Somewhat Important  3 - Not very important  4 - Not important at all  5 - Important, but cant do or no choice  6 - No response or non-responsive Enter Codes in Boxes    1 A. How important is it to you to choose what clothes to wear?    1 B. How important is it to you to take care of your personal belongings or things?    2 C. How important is it to you to choose between a tub bath, shower, bed bath or sponge bath?    1 D. How important is it to you to have snacks available between meals?    1 E. How important is it to you to choose your own bedtime?    1 F. How important is it to you to have your family or a close friend involved in discussions about your care?    1 G. How important is it to you to be able to use the phone in private?    1 H. How important is it to you to have a place to lock your things to keep them safe? .  Interview for Activity Preferences   Show resident the response options and say: Aristeo Sterling you are in this facility           Codin - Very Important  2 - Somewhat Important  3 - Not very important  4 - Not important at all  5 - Important, but cant do or no choice  6 - No response or non-responsive Enter Codes in Boxes    1 A. How important is it to you to have books, newspapers, and magazines to read?    1 B. How important is it to you to listen to music you like?    1 C. How important is it to you to be around animals such as pets?     1 D. How important is it to you to keep up with the news?    4 E. How important is it to you to do things with groups of people?     1 F. How important is it to you to do your favorite activities?     1 G. How important is it to you to go outside to get fresh air when the weather is good?     1 H. How important is it to you to participate in Baptism services or practices? .  Daily and Activity Preferences Primary Respondent   Enter Code    1 Indicate Primary respondent for Daily and Activity Preferences ( and )  1 - Resident  2 - Family or Significant other (close friend or other representative)  5 - Interview could not be completed by resident or family/significant other (no response to 3 or more items)

## 2019-05-28 NOTE — PROGRESS NOTES
[facility]  Recreational Therapy  Evaluation  Transitional Care Unit      Time Spent with Patient: 20 minutes    Date:  5/28/2019       Patient Name: Taiwo Escalera      MRN: [id number]       YOB: 1933 (80 y.o.)       Gender: female  Diagnosis: pubic ramus fracture RIght, closed  Referring Practitioner: Dr. Ansley Salinas, Dr. Chele Mayers. Bradley Attending    RESTRICTIONS/PRECAUTIONS:  Restrictions/Precautions: Weight Bearing, Fall Risk  Vision: Impaired  Hearing: Within functional limits    PAIN: 9- Right pelvic area. -RT student informed nurse about pain. SUBJECTIVE:  Pt. Lives alone.- Pt. Has family that lives In the [address] area. - Pt. Had one son who passed, and one grandson in [facility]. VISION:  Glasses- for reading    HEARING: Within Normal Limit    LEISURE INTERESTS:   Pt. Needs assistance with bathing, dressing, and driving.- Pt. Likes to clean around the house in free time.- pt. Enjoys going outside when the weather is permitting.- pt. Likes to visit her family and go out to eat with them.- Pt. Enjoys knitting and working on 800razors. Likes to participate in [employer] games.- Pt. Is interested in the adult coloring.- RT student will provide the materials for in-room activity.- Pt's affect was bright when talking about her dog. PT. Couldn't remember the name of the vet for future pet therapy visits.- RT student will search for the vet name that Pt. Gave.- Pt.  Was very sociable with RT student.-     BARRIERS TO LEISURE INTERESTS:    Decreased motivation and Pain           Patient Education  New Education Provided: Importance of Leisure, RT Plan of Care    Plan:  Continue to follow patient through this admission  Include patient in appropriate groups  See patient individually    Electronically signed by: Sebastian Marshall  Date: 5/28/2019

## 2019-05-28 NOTE — PATIENT CARE CONFERENCE
[facility]  Transitional Care Unit  Team Conference Note    Patient Name: Milo Blanchard   MRN: [id number]    : 1933  (80 y.o.)  Gender: female   Referring Practitioner: Sobeida Joyner, Ordering: Sandi Quesada MD  Diagnosis: superior and inferior pubic ramus fracture, right, initial encounter     Team Conference Date: 2019   Admission Date:   2313 33:28 AM  Re-Certification Date: 9/3/6590    :  Tentative Discharge Plan and current psychosocial issues: The patient lives alone and has a limited support system. Linwood Baird has [employer] services, including Continued  Care aids and also home delivered meals. Message left for [employer] , Shaneka Oates, with update on admission and requesting return call. Linwood Baird and family are unsure of needs yet at discharge. Linwood Baird is currently getting chemo, which is scheduled to continue through . Nursing:     Weight:  Weight: has been stable     Wounds/Incisions/Ulcers:  Skin/Wound Issues: bruising   Bowel: continent  Bladder:incontinent     Pain: Patient's pain is currently controlled with norco    Education Provided:  Diabetic:  Yes on no meds  Peg Tube:No    Koenig Care:  Education:NA  Removal Necessary:  NA  Supplies Needed: NA     Anticoagulant Use:  Patient on lovenox for anticoagulation, which is being managed by Primary Care Physician    Antibiotic Use:  Patient on antibiotics with a stop date of zpak to stop 19    Psychotropic Medication Use:  Patient on xanax and zoloft     Oxygen Use: No    Home Medications Reconciled: N/A    Physical Therapy:   Bed Mobility  Scooting: Minimal assistance  Transfers  Sit to Stand: Moderate Assistance, Minimal Assistance(MODx1 from bedside chair, MINx1 from EOB when elevated.  Patient with increased pain during WB on RLE, requiring assist to maintain balance and complete transfer)  Stand to sit: Minimal Assistance(MINx1 to control descent from various surfaces, cueing given for RLE

## 2019-05-29 LAB
ANION GAP SERPL CALCULATED.3IONS-SCNC: 12 MEQ/L (ref 8–16)
BASOPHILS # BLD: 0.1 %
BASOPHILS ABSOLUTE: 0 THOU/MM3 (ref 0–0.1)
BUN BLDV-MCNC: 20 MG/DL (ref 7–22)
CALCIUM SERPL-MCNC: 8.8 MG/DL (ref 8.5–10.5)
CHLORIDE BLD-SCNC: 102 MEQ/L (ref 98–111)
CO2: 27 MEQ/L (ref 23–33)
CREAT SERPL-MCNC: 0.7 MG/DL (ref 0.4–1.2)
EOSINOPHIL # BLD: 2.7 %
EOSINOPHILS ABSOLUTE: 0.2 THOU/MM3 (ref 0–0.4)
ERYTHROCYTE [DISTWIDTH] IN BLOOD BY AUTOMATED COUNT: 16.6 % (ref 11.5–14.5)
ERYTHROCYTE [DISTWIDTH] IN BLOOD BY AUTOMATED COUNT: 55.6 FL (ref 35–45)
GFR SERPL CREATININE-BSD FRML MDRD: 79 ML/MIN/1.73M2
GLUCOSE BLD-MCNC: 101 MG/DL (ref 70–108)
GLUCOSE BLD-MCNC: 92 MG/DL (ref 70–108)
HCT VFR BLD CALC: 30.3 % (ref 37–47)
HEMOGLOBIN: 9.2 GM/DL (ref 12–16)
IMMATURE GRANS (ABS): 0.15 THOU/MM3 (ref 0–0.07)
IMMATURE GRANULOCYTES: 1.8 %
LYMPHOCYTES # BLD: 25.1 %
LYMPHOCYTES ABSOLUTE: 2.1 THOU/MM3 (ref 1–4.8)
MCH RBC QN AUTO: 27.7 PG (ref 26–33)
MCHC RBC AUTO-ENTMCNC: 30.4 GM/DL (ref 32.2–35.5)
MCV RBC AUTO: 91.3 FL (ref 81–99)
MONOCYTES # BLD: 7.8 %
MONOCYTES ABSOLUTE: 0.7 THOU/MM3 (ref 0.4–1.3)
NUCLEATED RED BLOOD CELLS: 0 /100 WBC
PLATELET # BLD: 156 THOU/MM3 (ref 130–400)
PMV BLD AUTO: 11.5 FL (ref 9.4–12.4)
POTASSIUM SERPL-SCNC: 4.4 MEQ/L (ref 3.5–5.2)
RBC # BLD: 3.32 MILL/MM3 (ref 4.2–5.4)
SEG NEUTROPHILS: 62.5 %
SEGMENTED NEUTROPHILS ABSOLUTE COUNT: 5.3 THOU/MM3 (ref 1.8–7.7)
SODIUM BLD-SCNC: 141 MEQ/L (ref 135–145)
WBC # BLD: 8.5 THOU/MM3 (ref 4.8–10.8)

## 2019-05-29 PROCEDURE — 1290000000 HC SEMI PRIVATE OTHER R&B

## 2019-05-29 PROCEDURE — 85025 COMPLETE CBC W/AUTO DIFF WBC: CPT

## 2019-05-29 PROCEDURE — 6360000002 HC RX W HCPCS: Performed by: ORTHOPAEDIC SURGERY

## 2019-05-29 PROCEDURE — 97110 THERAPEUTIC EXERCISES: CPT

## 2019-05-29 PROCEDURE — 97530 THERAPEUTIC ACTIVITIES: CPT

## 2019-05-29 PROCEDURE — 82948 REAGENT STRIP/BLOOD GLUCOSE: CPT

## 2019-05-29 PROCEDURE — 97116 GAIT TRAINING THERAPY: CPT

## 2019-05-29 PROCEDURE — 97535 SELF CARE MNGMENT TRAINING: CPT

## 2019-05-29 PROCEDURE — 6370000000 HC RX 637 (ALT 250 FOR IP): Performed by: EMERGENCY MEDICINE

## 2019-05-29 PROCEDURE — 36415 COLL VENOUS BLD VENIPUNCTURE: CPT

## 2019-05-29 PROCEDURE — 94640 AIRWAY INHALATION TREATMENT: CPT

## 2019-05-29 PROCEDURE — 80048 BASIC METABOLIC PNL TOTAL CA: CPT

## 2019-05-29 PROCEDURE — 6370000000 HC RX 637 (ALT 250 FOR IP): Performed by: ORTHOPAEDIC SURGERY

## 2019-05-29 RX ADMIN — HYDROCODONE BITARTRATE AND ACETAMINOPHEN 2 TABLET: 5; 325 TABLET ORAL at 06:07

## 2019-05-29 RX ADMIN — PREDNISONE 2 MG: 1 TABLET ORAL at 09:04

## 2019-05-29 RX ADMIN — SIMVASTATIN 20 MG: 20 TABLET, FILM COATED ORAL at 22:20

## 2019-05-29 RX ADMIN — AZITHROMYCIN 250 MG: 250 TABLET, FILM COATED ORAL at 09:06

## 2019-05-29 RX ADMIN — HYDROCODONE BITARTRATE AND ACETAMINOPHEN 2 TABLET: 5; 325 TABLET ORAL at 15:30

## 2019-05-29 RX ADMIN — PANTOPRAZOLE SODIUM 40 MG: 40 TABLET, DELAYED RELEASE ORAL at 06:07

## 2019-05-29 RX ADMIN — ISOSORBIDE MONONITRATE 30 MG: 30 TABLET ORAL at 09:06

## 2019-05-29 RX ADMIN — HYDROCODONE BITARTRATE AND ACETAMINOPHEN 2 TABLET: 5; 325 TABLET ORAL at 22:20

## 2019-05-29 RX ADMIN — IPRATROPIUM BROMIDE AND ALBUTEROL SULFATE 1 AMPULE: .5; 3 SOLUTION RESPIRATORY (INHALATION) at 13:19

## 2019-05-29 RX ADMIN — MULTIPLE VITAMINS W/ MINERALS TAB 1 TABLET: TAB at 09:05

## 2019-05-29 RX ADMIN — SERTRALINE 50 MG: 50 TABLET, FILM COATED ORAL at 22:20

## 2019-05-29 RX ADMIN — SERTRALINE 50 MG: 50 TABLET, FILM COATED ORAL at 09:05

## 2019-05-29 RX ADMIN — METOPROLOL TARTRATE 12.5 MG: 25 TABLET ORAL at 22:21

## 2019-05-29 RX ADMIN — IPRATROPIUM BROMIDE AND ALBUTEROL SULFATE 1 AMPULE: .5; 3 SOLUTION RESPIRATORY (INHALATION) at 18:22

## 2019-05-29 RX ADMIN — ENOXAPARIN SODIUM 40 MG: 40 INJECTION SUBCUTANEOUS at 09:06

## 2019-05-29 RX ADMIN — CETIRIZINE HYDROCHLORIDE 5 MG: 10 TABLET, FILM COATED ORAL at 09:06

## 2019-05-29 RX ADMIN — LEVOTHYROXINE SODIUM 88 MCG: 88 TABLET ORAL at 06:07

## 2019-05-29 RX ADMIN — IPRATROPIUM BROMIDE AND ALBUTEROL SULFATE 1 AMPULE: .5; 3 SOLUTION RESPIRATORY (INHALATION) at 05:50

## 2019-05-29 ASSESSMENT — PAIN SCALES - GENERAL
PAINLEVEL_OUTOF10: 0
PAINLEVEL_OUTOF10: 6
PAINLEVEL_OUTOF10: 5
PAINLEVEL_OUTOF10: 10
PAINLEVEL_OUTOF10: 1
PAINLEVEL_OUTOF10: 8

## 2019-05-29 ASSESSMENT — PAIN DESCRIPTION - LOCATION
LOCATION: HIP
LOCATION: HIP

## 2019-05-29 ASSESSMENT — PAIN DESCRIPTION - DESCRIPTORS: DESCRIPTORS: ACHING;SORE

## 2019-05-29 ASSESSMENT — PAIN DESCRIPTION - FREQUENCY: FREQUENCY: INTERMITTENT

## 2019-05-29 ASSESSMENT — PAIN DESCRIPTION - ONSET: ONSET: ON-GOING

## 2019-05-29 ASSESSMENT — PAIN DESCRIPTION - PROGRESSION: CLINICAL_PROGRESSION: GRADUALLY IMPROVING

## 2019-05-29 ASSESSMENT — PAIN DESCRIPTION - ORIENTATION
ORIENTATION: RIGHT
ORIENTATION: RIGHT

## 2019-05-29 NOTE — PROGRESS NOTES
[address] [address]  Recreational Therapy  Daily Note  - [facility] SKILLED [facility]URSING UNIT    Time Spent with Patient: 10 minutes    Date:  5/29/2019       Patient Name: Fitz Gusman      MRN: [id number]      YOB: 1933 (80 y.o.)       Gender: female  Diagnosis: pubic ramus fracture RIght, closed  Referring Practitioner: Dr. Jaciel Mac, Dr. Claudeen Quinones. Bradley Attending    Patient enjoyed spending time with our pet therapy dogs.  Pt enjoyed petting our pet therapy dog May this afternoon while resting in bed-affect bright and social     Electronically signed by: WALTER Carlson  Date: 5/29/2019

## 2019-05-29 NOTE — PLAN OF CARE
Care Plan reviewed with patient. Patient verbalizes understanding to educator and is willing to contribute and participate in goal planning.

## 2019-05-29 NOTE — PLAN OF CARE
Problem: Impaired respiratory status  Goal: Clear lung sounds  Outcome: Ongoing   Continue therapy as ordered to improve breath sounds/aeration.

## 2019-05-29 NOTE — PLAN OF CARE
Problem: Discharge Planning:  Intervention: Involve patient/S. O. in Discharge Planning process  Note:   The TCU team meeting was held on 5/29. The patient is requiring assistance in numerous areas, and can benefit from more time on TCU. The team is planning to reassess progress at next meeting on 6/4. She is limited to no more than 20 skilled days, due to having Medicaid as a secondary insurance. A chemotherapy treatment for her cancer is planned at the oncologist's office tomorrow, 5/30. Her niece is planning to take her and then return her to TCU afterward. Following the meeting, the  met with patient and provided update. She seemed disappointed that she would not be returning home as soon as she expected, and emotional support was provided. The patient could not recall the  agency through which she receives her [address] [place] [address] aides, but stated that she has services 2 hours a day, 7 days a week. PASSP[employer] worker is Vannessa Peña ([phone]), and this worker left a voicemail message to provide update regarding length of stay.  MIGEL Benjamin

## 2019-05-29 NOTE — PROGRESS NOTES
[facility]DR[facility] INPATIENT  INPATIENT PHYSICAL THERAPY  DAILY NOTE  [facility] SKILLED NURSING UNIT - [id number]    Time In: 1138  Time Out: 1235  Timed Code Treatment Minutes: 62 Minutes  Minutes: 57          Date: 2019  Patient Name: Victorina Clement,  Gender:  female        MRN: [id number]  : 1933  (80 y.o.)     Referring Practitioner: Lina Brasher, Ordering: Sukhi Cortez MD  Diagnosis: superior and inferior pubic ramus fracture, right, initial encounter   Additional Pertinent Hx: The patient is a 80 y.o. female  With multiple medical comorbidities who was admitted to ortho services for treatment of a right pelvic fracture. Patient has a history of breast CA with mets to the liver and is currently undergoing weekly chemotherapy. She completed a treatment yesterday and notes that she usually gets dizzy afterwards. She reports a fall in her home after an episode of dizziness yesterday. She fell onto her right side landing on the right hip. She was unable to bear weight afterwards. She denies hitting her head and no LOC. X-ray shows a right superior and inferior pubic rami fracture. Patient lives alone and is typically independent. She does ambulate with a walker. CT negative for R femur fracture.      Past Medical History:   Diagnosis Date    Anxiety     Blind right eye     Breast cancer metastasized to liver ([address] 05/10/2016    Liver lesion noted     Carotid stenosis      Left ICA 25%    Colostomy in place [facility]) 2016    Degenerative arthritis of cervical spine     GERD (gastroesophageal reflux disease)     Hypercholesteremia     Hypoestrogenism     Hypothyroidism     Lumbago     Lumbosacral spinal stenosis 2019    MDRO (multiple drug resistant organisms) resistance     pt stated cleared    Personal history of cardiac dysrhythmia     Sigmoid diverticulosis     Spondylolisthesis of lumbosacral region     Steroid-induced hyperglycemia     Subclavian artery stenosis ([address]     left    Superior and Inferior Pubic ramus fracture, right, closed, initial encounter ([address] 05/21/2019    SVT (supraventricular tachycardia) ([address] 6/07    Temporal arteritis ([address]     Vertebral artery occlusion     left    Vitamin D deficiency 06/2017     Past Surgical History:   Procedure Laterality Date    CARDIAC CATHETERIZATION  5/07    CATARACT REMOVAL  right 1/08; left 2/08    CHOLECYSTECTOMY  1/03    COLONOSCOPY  11/06    COLOSTOMY  09/04/2018    REVERSAL OF COLOSTOMY    DILATION AND CURETTAGE OF UTERUS      ENDOSCOPY, COLON, DIAGNOSTIC      EYE SURGERY      EYE SURGERY Right 11/06/2017    Dr Clayton Auguste in [address]      partial    OTHER SURGICAL HISTORY  05/27/2016    robotic assisted laparoscopic anterior colon resection and end colostomy    [place] OFFICE/OUTPT VISIT,PROCEDURE ONLY N/A 9/4/2018    COLOSTOMY REVERSAL AND PARASTOMAL HERNIA REPAIR performed by Ellie Hall MD at [address] [facility]  2002    UPPER GASTROINTESTINAL ENDOSCOPY  11/06       Restrictions/Precautions:  Weight Bearing, Fall Risk     Right Lower Extremity Weight Bearing: Weight Bearing As Tolerated     Prior Level of Function:  ADL Assistance: Needs assistance(HH aide assists pt 5 days a week x 2 hours.)  Homemaking Assistance: Needs assistance(aide assists with housekeeping and grocery shopping.)  Ambulation Assistance: Independent  Transfer Assistance: Independent  Additional Comments: Pt states amb with RW, O2 at night; has aide services 5 days/week that assists with laundry, showers and grocery shopping. Subjective:  Response To Previous Treatment: Patient with no complaints from previous session.   Family / Caregiver Present: No  Comments: pt. less fatigued and more alert throughout entire session today unlike pt's tolerance to therapy yesterday  Subjective: Patient resting in bed after returning from getting her hair done by the beautician. patient pleasant and agreeable to therapy. Pain:   .  Pain Assessment  Pain Level: 1(\"it's not too bad unless I move alot\")  Pain Location: Hip  Pain Orientation: Right  Pain Descriptors: Aching; Sore  Pain Frequency: Intermittent  Pain Onset: On-going  Clinical Progression: Gradually improving       Social/Functional:  Lives With: Alone  Type of Home: House  Home Layout: One level  Home Access: Level entry  Home Equipment: Rolling walker, Oxygen     Objective:  Rolling to Left: Stand by assistance(in bed with use of rail)  Supine to Sit: Minimal assistance;Stand by assistance(sba in bed with use of rail and hob up ~30 degrees , min a with trunk and b le's on mat)  Sit to Supine: Moderate assistance(with raising of b le's on mat)  Scooting: Stand by assistance(scoot to edge of sitting surfaces)    Transfers  Sit to Stand: Minimal Assistance; Moderate Assistance(fluctuates from min-> mod a.   freq. !! vc for hand placement)  Stand to sit: Contact guard assistance    Toilet transfer with rolling walker, ETS with cga stand to sit, min a sit to stand     Standing balance at sink to wash/  dry hands reaching within rashad head to knee levels with close sba       Ambulation 1  Surface: level tile  Device: Rolling Walker  Other Apparatus: Wheelchair follow  Assistance: Contact guard assistance  Quality of Gait: Slow and guarded due to pain. Decreased B step lengths and decreased B step clearance.  freq. vc to amb. closer to walker and for posture. Mild instability but no LOB  Gait Deviations: Slow Gillian;Decreased step length;Decreased step height  Distance:  15' x 1,100' x 2  Comments: pt. reported that walking felt \"real good \"and stated: \"I need to do this to get home with my dog! \"  Exercises:  Exercises  Comments: Seated in wheelchair pt completed B LE ankle pumps, LAQ, marches, hip abd/add,  resistance t-band ham curls, hip abd/add and b hip add ball squeezes 10 reps each and 12 reps each b le supine ap's, quad/glute sets, saq ,heeslides, hip abd/add with vc's for proper completion all to increase strength for improved functional mobiltiy. Activity Tolerance:  Activity Tolerance: Patient Tolerated treatment well;Patient limited by fatigue  Activity Tolerance: pt. more alert today vs. yesterdays session    Discharge Recommendations:  Discharge Recommendations: Continue to assess pending progress    Patient Education:  Patient Education: POC, bed mobility, transfers, gait pattern to offload RLE     Equipment Recommendations:  Equipment Needed: No    Safety:  Type of devices: All fall risk precautions in place, Patient at risk for falls, Call light within reach, Left in chair, Chair alarm in place, Gait belt(lunch tray being delivered)    Plan:  Times per week: 6x/wk  Times per day: Daily  Current Treatment Recommendations: Strengthening, Functional Mobility Training, Transfer Training, Balance Training, Gait Training, Stair training, Home Exercise Program, Equipment Evaluation, Education, & procurement, Safety Education & Training, Patient/Caregiver Education & Training, Endurance Training    Goals:  Patient goals : to get stronger    Short term goals  Time Frame for Short term goals: 1 week  Short term goal 1: Pt to transfer supine <--> sit MINx1 to enable pt to get in/out of bed. Short term goal 2: Pt to transfer sit <--> stand CGA for increased functional mobility.   Short term goal 3: Pt to ambulate >15 feet for household ambulation with use of RW and CGA  Short term goal 4: Patient able to stand with F+ balance in order to decrease risk of future falls    Long term goals  Time Frame for Long term goals : 2 weeks  Long term goal 1: Patient will be able to perform bed mobility CGA to get in/out of bed  Long term goal 2: Patient will be able to perform functional transfers SBA to sit in bedside chair  Long term goal 3: Patient will be able to ambulate up to 50 feet with RW and CGA to walk around home safely  Long term goal 4: Patient able to stand with G balance in order to decrease risk of future falls  If patient is discharged prior to progress note completion, this note is to serve as the discharge note with all goals being unmet unless indicated otherwise.

## 2019-05-29 NOTE — PROGRESS NOTES
Sandra Copeland 60  INPATIENT OCCUPATIONAL THERAPY  [facility] SKILLED NURSING UNIT  DAILY NOTE    Time:  Time In: 0900  Time Out: 0956  Timed Code Treatment Minutes: 64 Minutes  Minutes: 56          Date: 2019  Patient Name: Lucian Matthews,   Gender: female      Room: [facility]71/071-A  MRN: [id number]  : 1933  (80 y.o.)  Referring Practitioner: Dr. Cassandra Padilla, Dr. Ирина Huerta Attending  Diagnosis: pubic ramus fracture RIght, closed  Additional Pertinent Hx: The patient is a 80 y.o. female  With multiple medical comorbidities who was admitted to ortho services for treatment of a right pelvic fracture. Patient has a history of breast CA with mets to the liver and is currently undergoing weekly chemotherapy. She completed a treatment yesterday and notes that she usually gets dizzy afterwards. She reports a fall in her home after an episode of dizziness yesterday. She fell onto her right side landing on the right hip. She was unable to bear weight afterwards. She denies hitting her head and no LOC. X-ray shows a right superior and inferior pubic rami fracture. Patient lives alone and is typically independent. She does ambulate with a walker. CT negative for R femur fracture.     Past Medical History:   Diagnosis Date    Anxiety     Blind right eye     Breast cancer metastasized to liver ([address] 05/10/2016    Liver lesion noted     Carotid stenosis      Left ICA 25%    Colostomy in place [facility]) 2016    Degenerative arthritis of cervical spine     GERD (gastroesophageal reflux disease)     Hypercholesteremia     Hypoestrogenism     Hypothyroidism     Lumbago     Lumbosacral spinal stenosis 2019    MDRO (multiple drug resistant organisms) resistance     pt stated cleared    Personal history of cardiac dysrhythmia     Sigmoid diverticulosis     Spondylolisthesis of lumbosacral region     Steroid-induced hyperglycemia     Subclavian artery stenosis ([address]     left    Superior and Inferior Pubic ramus fracture, right, closed, initial encounter ([facility] 05/21/2019    SVT (supraventricular tachycardia) ([facility] 6/07    Temporal arteritis ([facility]     Vertebral artery occlusion     left    Vitamin D deficiency 06/2017     Past Surgical History:   Procedure Laterality Date    CARDIAC CATHETERIZATION  5/07    CATARACT REMOVAL  right 1/08; left 2/08    CHOLECYSTECTOMY  1/03    COLONOSCOPY  11/06    COLOSTOMY  09/04/2018    REVERSAL OF COLOSTOMY    DILATION AND CURETTAGE OF UTERUS      ENDOSCOPY, COLON, DIAGNOSTIC      EYE SURGERY      EYE SURGERY Right 11/06/2017    Dr Joesph Gardiner in [address]      partial    OTHER SURGICAL HISTORY  05/27/2016    robotic assisted laparoscopic anterior colon resection and end colostomy    [place] OFFICE/OUTPT VISIT,PROCEDURE ONLY N/A 9/4/2018    COLOSTOMY REVERSAL AND PARASTOMAL HERNIA REPAIR performed by Harris Son MD at [address] [facility]  2002    UPPER GASTROINTESTINAL ENDOSCOPY  11/06       Restrictions/Precautions:  Weight Bearing, Fall Risk     Right Lower Extremity Weight Bearing: Weight Bearing As Tolerated                      Prior Level of Function:  ADL Assistance: Needs assistance(HH aide assists pt 5 days a week x 2 hours.)  Homemaking Assistance: Needs assistance(aide assists with housekeeping and grocery shopping.)  Ambulation Assistance: Independent  Transfer Assistance: Independent  Additional Comments: Pt states amb with RW, O2 at night; has aide services 5 days/week that assists with laundry, showers and grocery shopping.     Subjective       Subjective: Patient lying in bed finishing breakfast upon arrival. Agreeable to OT session    Overall Orientation Status: Within Functional Limits         Pain:  Pain Assessment  Patient Currently in Pain: Yes  Pain Assessment: 0-10  Pain Level: 5  Pain Location: Hip  Pain Orientation: Right       Objective  Overall Cognitive Status: Exceptions  Cognition Comment: decreased safety noted throughout session       ADL  Grooming: Setup(for hair care and to apply face moisterizer seated EOB)  UE Dressing: Setup(to doff gown and don undershirt/shirt seated EOB, extended time to adjust with 1 cue provided)  LE Dressing: Moderate assistance(threading BLE into pants/undergarment and to don B shoes. Able to pull up over hips with Morales and extended time.)  Toileting: Minimal assistance(for clothing management, able to complete hygiene with CGA. Increased time provided with use of bathroom this AM)  Additional Comments: Would benefit from education on use of LHAE          Bed mobility  Supine to Sit: Contact guard assistance(increased time to complete)  Scooting: Stand by assistance    Transfers  Sit to stand: Contact guard assistance(from EOB, w/c, RTS)  Stand to sit: Contact guard assistance  Transfer Comments: Minimal cueing provided throughout for proper hand plaecment during transfers  Toilet Transfers  Equipment Used: Raised toilet seat with rails  Toilet Transfer: Contact guard assistance    Balance  Sitting Balance: Stand by assistance  Standing Balance: Contact guard assistance           Functional Mobility  Functional - Mobility Device: Rolling Walker  Activity: To/from bathroom; Other  Assist Level: Contact guard assistance  Functional Mobility Comments: Approx 40 ft x1 trial at slow pace. No LOB noted. Required seated rest break after mobility. Type of ROM/Therapeutic Exercise: AROM  Comment: Pt completed BUE strengthening exercises x10 reps x1 set this date with min resistance band in all joints and all planes in order to increase strength and improve activity tolerance required for functional toilet & shower transfers and ADL routine. Pt tolerated fair, requiring rest breaks throughout task. Activity Tolerance:  Activity Tolerance: Patient limited by pain; Patient limited by fatigue    Assessment:     Performance deficits / Impairments: Decreased functional mobility , Decreased ADL status, Decreased endurance, Decreased strength, Decreased balance, Decreased safe awareness, Decreased high-level IADLs  Prognosis: Good    Discharge Recommendations:  Discharge Recommendations: Patient would benefit from continued therapy after discharge    Patient Education:  Patient Education: safety with transfers and mobility, ADL strateiges, HEP    Equipment Recommendations: Other: Pt would benefit from new shower chair as hers is approx 8 yrs old, not sturdy and screws are loosening. [facility] benefit from [facility] and [facility]. Safety:  Safety Devices in place: Yes  Type of devices: Left in chair, Gait belt(Left with beautician to get hair done on 7th floor)       Plan:  Times per week: 6x  Current Treatment Recommendations: Strengthening, Balance Training, Endurance Training, Functional Mobility Training, Patient/Caregiver Education & Training, Self-Care / ADL, Safety Education & Training, Home Management Training, Equipment Evaluation, Education, & procurement    Goals:  Patient goals : decrease pain to be able to go home soon.     Short term goals  Time Frame for Short term goals: 1 week  Short term goal 1: Pt will complete ADL routine with no cues for adapted tech and LHAE PRN to increase independende in self care tasks   Short term goal 2: Pt will complete functional ambulation to and from the BR with CGA and no cues for RW safety to increase independence in toileting routine  Short term goal 3: Pt will complete 1 step light homemaking tasks with no > min cues for attention to task to increase independence in clothng retrieval.   Short term goal 4: Pt will complete standing ADL with CGA with 1 UE release for > 3 minutes and no > 1 vc to attend to task to increase ease with sink side grooming  Short term goal 5: Pt will demo indep wtih BUE light strenthenng HEP  for improved UB strength and activity tolerance needed for ease with funcitonal transfers and ADLs  Long term

## 2019-05-29 NOTE — PROGRESS NOTES
Family Medicine Progress Notes/Coverage    Today's Date: 5/29/19  8E-71/071-A  Medical Record # [id number]  Account # [de-identified]      Ms. Rod admitted on 5/24/2019        Subjective / Interval History : [birth year] birthday  Pain right pelvis/ hip especially when ambulating or walking otherwise doing well, No SOB, No chest pain , No fatigue.  No nausea nor abdominal pain    Reviewed notes, consults, laboratory and radiology results,    Objective:       Physical Exam:  Patient Vitals for the past 24 hrs:   BP Temp Temp src Pulse Resp SpO2   05/29/19 0757 (!) 92/54 95.9 °F (35.5 °C) Oral 69 16 (!) 87 %   05/28/19 2015 126/60 98.1 °F (36.7 °C) Oral 72 20 94 %       General Appearance:  Alert, cooperative, no distress, appears stated age   [de-identified]:  Neck:      Chest/Lungs:  Heart: CTA  RRR   Abdomen:  Back: Soft non tender   Extremities:  Neurological Exam: No edema  WNL       Assessment:     Admitting Diagnosis:    right pelvic fracture    Active Hospital Problems    Diagnosis Date Noted    Superior and Inferior Pubic ramus fracture, right, closed, initial encounter [facility]) Luz Ojeda 05/21/2019     Priority: High    Anxiety [F41.9] 06/29/2016     Priority: High    Breast cancer metastasized to liver ([facility] [C50.919, C78.7] 06/08/2016     Priority: High    On antineoplastic chemotherapy [Z79.899] 05/25/2019     Priority: Medium    Lumbosacral spinal stenosis [M48.07] 05/01/2019     Priority: Medium    Personal history of cardiac dysrhythmia [Z86.79]      Priority: Medium    Current chronic use of systemic steroids [Z79.52] 09/04/2018     Priority: Medium    H/O: CVA (cerebrovascular accident) [Z86.73] 11/06/2017     Priority: Medium    Coronary artery disease involving native coronary artery of native heart without angina pectoris [I25.10] 11/06/2017     Priority: Medium    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]      Priority: Medium    Hypothyroidism due to acquired atrophy of thyroid [E03.4] 03/03/2016     Priority: Medium    Hypercholesteremia [E78.00]      Priority: Medium    Temporal arteritis ([address] [M31.6]      Priority: Medium    GERD (gastroesophageal reflux disease) [K21.9]      Priority: Medium       Plan:     Discussed plans with the nursing staff  Okay to go on pass to Dr Erica Gudino office for her chemo    Medications, Laboratories and Imaging results:    Scheduled Meds:   azithromycin  250 mg Oral Daily    ipratropium-albuterol  1 ampule Inhalation Q6H WA    [START ON 6/9/2019] clopidogrel  75 mg Oral Daily    [START ON 6/9/2019] aspirin  325 mg Oral Daily    enoxaparin  40 mg Subcutaneous Daily    cetirizine  5 mg Oral Daily    isosorbide mononitrate  30 mg Oral Daily    levothyroxine  88 mcg Oral Daily    metoprolol tartrate  12.5 mg Oral BID    pantoprazole  40 mg Oral QAM AC    ppd   Does not apply Weekly    predniSONE  2 mg Oral Daily    sertraline  50 mg Oral BID    simvastatin  20 mg Oral Nightly    therapeutic multivitamin-minerals  1 tablet Oral Daily    tuberculin  5 Units Intradermal Weekly     Continuous Infusions:  PRN Meds:acetaminophen, HYDROcodone 5 mg - acetaminophen **OR** HYDROcodone 5 mg - acetaminophen, magnesium hydroxide, ALPRAZolam    Imaging:    Lab Review :    Lab Results   Component Value Date    WBC 8.5 05/29/2019    HGB 9.2 (L) 05/29/2019    HCT 30.3 (L) 05/29/2019    MCV 91.3 05/29/2019     05/29/2019     Lab Results   Component Value Date    CREATININE 0.7 05/29/2019    BUN 20 05/29/2019     05/29/2019    K 4.4 05/29/2019     05/29/2019    CO2 27 05/29/2019     Lab Results   Component Value Date    CKTOTAL 41 10/16/2016     Lab Results   Component Value Date    ALT 15 12/22/2018    AST 14 12/22/2018    ALKPHOS 53 12/22/2018    BILITOT 0.6 12/22/2018     Lab Results   Component Value Date    LIPASE 11.4 12/22/2018         Electronically signed by Lurdes Duckworth MD on 5/29/19 at 10:33 Renny Carson M.D.

## 2019-05-30 LAB — GLUCOSE BLD-MCNC: 98 MG/DL (ref 70–108)

## 2019-05-30 PROCEDURE — 94640 AIRWAY INHALATION TREATMENT: CPT

## 2019-05-30 PROCEDURE — 6370000000 HC RX 637 (ALT 250 FOR IP): Performed by: EMERGENCY MEDICINE

## 2019-05-30 PROCEDURE — 97530 THERAPEUTIC ACTIVITIES: CPT

## 2019-05-30 PROCEDURE — 6370000000 HC RX 637 (ALT 250 FOR IP): Performed by: ORTHOPAEDIC SURGERY

## 2019-05-30 PROCEDURE — 97110 THERAPEUTIC EXERCISES: CPT

## 2019-05-30 PROCEDURE — 82948 REAGENT STRIP/BLOOD GLUCOSE: CPT

## 2019-05-30 PROCEDURE — 6360000002 HC RX W HCPCS: Performed by: ORTHOPAEDIC SURGERY

## 2019-05-30 PROCEDURE — 97116 GAIT TRAINING THERAPY: CPT

## 2019-05-30 PROCEDURE — 97112 NEUROMUSCULAR REEDUCATION: CPT

## 2019-05-30 PROCEDURE — 1290000000 HC SEMI PRIVATE OTHER R&B

## 2019-05-30 PROCEDURE — 97535 SELF CARE MNGMENT TRAINING: CPT

## 2019-05-30 RX ADMIN — SIMVASTATIN 20 MG: 20 TABLET, FILM COATED ORAL at 21:11

## 2019-05-30 RX ADMIN — PREDNISONE 2 MG: 1 TABLET ORAL at 09:00

## 2019-05-30 RX ADMIN — ENOXAPARIN SODIUM 40 MG: 40 INJECTION SUBCUTANEOUS at 09:01

## 2019-05-30 RX ADMIN — IPRATROPIUM BROMIDE AND ALBUTEROL SULFATE 1 AMPULE: .5; 3 SOLUTION RESPIRATORY (INHALATION) at 18:02

## 2019-05-30 RX ADMIN — SERTRALINE 50 MG: 50 TABLET, FILM COATED ORAL at 09:01

## 2019-05-30 RX ADMIN — PANTOPRAZOLE SODIUM 40 MG: 40 TABLET, DELAYED RELEASE ORAL at 06:18

## 2019-05-30 RX ADMIN — ISOSORBIDE MONONITRATE 30 MG: 30 TABLET ORAL at 09:00

## 2019-05-30 RX ADMIN — METOPROLOL TARTRATE 12.5 MG: 25 TABLET ORAL at 05:00

## 2019-05-30 RX ADMIN — IPRATROPIUM BROMIDE AND ALBUTEROL SULFATE 1 AMPULE: .5; 3 SOLUTION RESPIRATORY (INHALATION) at 13:10

## 2019-05-30 RX ADMIN — HYDROCODONE BITARTRATE AND ACETAMINOPHEN 1 TABLET: 5; 325 TABLET ORAL at 21:12

## 2019-05-30 RX ADMIN — IPRATROPIUM BROMIDE AND ALBUTEROL SULFATE 1 AMPULE: .5; 3 SOLUTION RESPIRATORY (INHALATION) at 05:56

## 2019-05-30 RX ADMIN — MULTIPLE VITAMINS W/ MINERALS TAB 1 TABLET: TAB at 09:01

## 2019-05-30 RX ADMIN — HYDROCODONE BITARTRATE AND ACETAMINOPHEN 2 TABLET: 5; 325 TABLET ORAL at 10:13

## 2019-05-30 RX ADMIN — LEVOTHYROXINE SODIUM 88 MCG: 88 TABLET ORAL at 06:17

## 2019-05-30 RX ADMIN — METOPROLOL TARTRATE 12.5 MG: 25 TABLET ORAL at 21:11

## 2019-05-30 RX ADMIN — AZITHROMYCIN 250 MG: 250 TABLET, FILM COATED ORAL at 09:01

## 2019-05-30 RX ADMIN — ALPRAZOLAM 0.5 MG: 0.5 TABLET ORAL at 21:12

## 2019-05-30 RX ADMIN — SERTRALINE 50 MG: 50 TABLET, FILM COATED ORAL at 21:11

## 2019-05-30 RX ADMIN — HYDROCODONE BITARTRATE AND ACETAMINOPHEN 2 TABLET: 5; 325 TABLET ORAL at 06:17

## 2019-05-30 RX ADMIN — CETIRIZINE HYDROCHLORIDE 5 MG: 10 TABLET, FILM COATED ORAL at 09:00

## 2019-05-30 ASSESSMENT — PAIN DESCRIPTION - PROGRESSION

## 2019-05-30 ASSESSMENT — PAIN DESCRIPTION - PAIN TYPE: TYPE: ACUTE PAIN

## 2019-05-30 ASSESSMENT — PAIN SCALES - GENERAL
PAINLEVEL_OUTOF10: 8
PAINLEVEL_OUTOF10: 10
PAINLEVEL_OUTOF10: 0
PAINLEVEL_OUTOF10: 4
PAINLEVEL_OUTOF10: 8
PAINLEVEL_OUTOF10: 0
PAINLEVEL_OUTOF10: 10

## 2019-05-30 ASSESSMENT — PAIN - FUNCTIONAL ASSESSMENT: PAIN_FUNCTIONAL_ASSESSMENT: PREVENTS OR INTERFERES SOME ACTIVE ACTIVITIES AND ADLS

## 2019-05-30 ASSESSMENT — PAIN DESCRIPTION - ORIENTATION
ORIENTATION: RIGHT
ORIENTATION: RIGHT

## 2019-05-30 ASSESSMENT — PAIN DESCRIPTION - LOCATION
LOCATION: HIP
LOCATION: HIP

## 2019-05-30 ASSESSMENT — PAIN DESCRIPTION - FREQUENCY: FREQUENCY: INTERMITTENT

## 2019-05-30 ASSESSMENT — PAIN DESCRIPTION - DESCRIPTORS: DESCRIPTORS: ACHING;DISCOMFORT;DULL;SHARP

## 2019-05-30 NOTE — PLAN OF CARE
Zofia Rodriguez  [id number]    This document includes baseline careplan information as well as current active orders for this admission to Transitional Care Unit (8E). A copy was given to the patient and/or patient representative today, 5/30/2019. Current Active Orders:  Orders Placed This Encounter   Procedures    CBC auto differential    Basic Metabolic Panel    Anion Gap    Glomerular Filtration Rate, Estimated    DIET GENERAL;    Waffle cushion in chair when up    Weigh patient weekly    Elevate heels off of bed at all times if patient is not able to move lower extremities    Inspect skin per unit guidelines    Maintain HOB at the lowest elevation consistent with medical plan of care    Turn or assist with turn every 2 hours if patient is unable to turn self. Remind patient to turn if necessary.     Gradual Compression Stockings (BARRY)    Up with assistance    Place intermittent pneumatic compression device    Activity as tolerated    Place PPD    Vital signs    Waffle cushion in chair when up    Weight bearing as tolerated    Full Code    Consult to Recreation Therapy    Consult to Social Work    Inpatient consult to Dietitian    Dietary Nutrition Supplements: Standard High Calorie Oral Supplement    Dietary Nutrition Supplements: Other Oral Supplement (see comment)    OT eval and treat    PT eval and treat    Initiate Oxygen Therapy Protocol    HHN Treatment    POCT Glucose    Therapeutic day pass       Current Medications:  Current Facility-Administered Medications   Medication Dose Route Frequency Provider Last Rate Last Dose    azithromycin (ZITHROMAX) tablet 250 mg  250 mg Oral Daily Lurdes Duckworth MD   250 mg at 05/30/19 0901    ipratropium-albuterol (DUONEB) nebulizer solution 1 ampule  1 ampule Inhalation Q6H WA Lurdes Duckworth MD   1 ampule at 05/30/19 1310    [START ON 6/9/2019] clopidogrel (PLAVIX) tablet 75 mg  75 mg Oral Daily Joaquín Fallon MD       [facility]

## 2019-05-30 NOTE — PROGRESS NOTES
Subclavian artery stenosis (HCC)     left    Superior and Inferior Pubic ramus fracture, right, closed, initial encounter ([facility] 05/21/2019    SVT (supraventricular tachycardia) ([facility] 6/07    Temporal arteritis ([facility]     Vertebral artery occlusion     left    Vitamin D deficiency 06/2017     Past Surgical History:   Procedure Laterality Date    CARDIAC CATHETERIZATION  5/07    CATARACT REMOVAL  right 1/08; left 2/08    CHOLECYSTECTOMY  1/03    COLONOSCOPY  11/06    COLOSTOMY  09/04/2018    REVERSAL OF COLOSTOMY    DILATION AND CURETTAGE OF UTERUS      ENDOSCOPY, COLON, DIAGNOSTIC      EYE SURGERY      EYE SURGERY Right 11/06/2017    Dr Masters See in [address]      partial    OTHER SURGICAL HISTORY  05/27/2016    robotic assisted laparoscopic anterior colon resection and end colostomy    DC OFFICE/OUTPT VISIT,PROCEDURE ONLY N/A 9/4/2018    COLOSTOMY REVERSAL AND PARASTOMAL HERNIA REPAIR performed by Mark Hernandez MD at [address] [facility]  2002    UPPER GASTROINTESTINAL ENDOSCOPY  11/06       Restrictions/Precautions:  Weight Bearing, Fall Risk     Right Lower Extremity Weight Bearing: Weight Bearing As Tolerated                      Prior Level of Function:  ADL Assistance: Needs assistance(HH aide assists pt 5 days a week x 2 hours.)  Homemaking Assistance: Needs assistance(aide assists with housekeeping and grocery shopping.)  Ambulation Assistance: Independent  Transfer Assistance: Independent  Additional Comments: Pt states amb with RW, O2 at night; has aide services 5 days/week that assists with laundry, showers and grocery shopping. Subjective:  Chart Reviewed: Yes  Response To Previous Treatment: Patient with no complaints from previous session. Family / Caregiver Present: No  Subjective: Pt resting in bedside chair upon arrival, agreeable to therapy noting she is having a lot of pain today. RN in partway through session to give pain meds. Pain:  Yes. Pain Assessment  Pain Level: 10       Social/Functional:  Lives With: Alone  Type of Home: House  Home Layout: One level  Home Access: Level entry  Home Equipment: Rolling walker, Oxygen     Objective:  Supine to Sit: Minimal assistance(Off of flat mat table)  Sit to Supine: Minimal assistance(OF LEs)    Transfers  Sit to Stand: Minimal Assistance;Contact guard assistance(Mostly Contact Guard Assist. Did require Min A from EOM towards end of session. )  Stand to sit: Contact guard assistance       Ambulation 1  Surface: level tile  Device: Rolling Walker  Other Apparatus: Wheelchair follow  Assistance: Contact guard assistance  Quality of Gait: SLow and guarded due to pain. Decreased B step lengths and decreased weight shifts to R LE. Min instability but no LOB  Distance: 100 feet x2     Stairs  Curbs: 2\"  Device: Rolling walker  Assistance: Contact guard assistance  Comment: Cuing for technique. Balance  Comments: Dynamic standing reaching activity focussing on increasing weight shifts to R LE for improved gait technique. Dynamic  bathroom to manage clothing, complete dc clean up, and to wash hands at sink all at [facility] Assist.       Exercises:  Exercises  Comments: Pt completed supine BLE ankle pumps, quad set, glut set, heel slides, SAQ, hip abd/add and seated LAQ, marches, HS curl with tband, hip add with ball all x10-12 reps to increase strength for improved functional mobility. Activity Tolerance:  Activity Tolerance: Patient Tolerated treatment well;Patient limited by fatigue    Assessment: Body structures, Functions, Activity limitations: Decreased functional mobility , Decreased strength, Decreased safe awareness, Decreased balance, Decreased endurance, Increased Pain  Assessment: Pt tolerated session fairly well. Limited by pain, decreased strength, decreased mobility.  Would benefit from continued therapy at this time  Prognosis: Good          Discharge

## 2019-05-30 NOTE — PLAN OF CARE
Problem: Impaired respiratory status  Goal: Clear lung sounds  Outcome: Ongoing   Breath sounds clear and diminished, continuing treatments as ordered.

## 2019-05-30 NOTE — PLAN OF CARE
Problem: Falls - Risk of:  Goal: Will remain free from falls  Description  Will remain free from falls  Outcome: Ongoing  Pt will remain free of falls. Problem: Risk for Impaired Skin Integrity  Goal: Tissue integrity - skin and mucous membranes  Description  Structural intactness and normal physiological function of skin and  mucous membranes. Outcome: Ongoing  Skin assessment every shift. Problem: Pain:  Goal: Pain level will decrease  Description  Pain level will decrease  Outcome: Ongoing  Reposition frequently to alleviate pain. Problem: Bleeding:  Goal: Will show no signs and symptoms of excessive bleeding  Description  Will show no signs and symptoms of excessive bleeding  Outcome: Ongoing  Pt will have no active bleeding. Problem: Cardiac:  Goal: Ability to maintain vital signs within normal range will improve  Description  Ability to maintain vital signs within normal range will improve  Outcome: Ongoing  Monitor vs and report abn to dr. Lianna Woodruff: Impaired respiratory status  Goal: Clear lung sounds  5/30/2019 0559 by Pablo Romero RCP  Outcome: Ongoing  Encourage use of incentive spirometry every hour while awake.

## 2019-05-31 PROCEDURE — 0220000000 HC SKILLED NURSING FACILITY

## 2019-05-31 PROCEDURE — 6370000000 HC RX 637 (ALT 250 FOR IP): Performed by: EMERGENCY MEDICINE

## 2019-05-31 PROCEDURE — 1290000000 HC SEMI PRIVATE OTHER R&B

## 2019-05-31 PROCEDURE — 94640 AIRWAY INHALATION TREATMENT: CPT

## 2019-05-31 PROCEDURE — 97110 THERAPEUTIC EXERCISES: CPT

## 2019-05-31 PROCEDURE — 6360000002 HC RX W HCPCS: Performed by: ORTHOPAEDIC SURGERY

## 2019-05-31 PROCEDURE — 97530 THERAPEUTIC ACTIVITIES: CPT

## 2019-05-31 PROCEDURE — 6370000000 HC RX 637 (ALT 250 FOR IP): Performed by: ORTHOPAEDIC SURGERY

## 2019-05-31 PROCEDURE — 97116 GAIT TRAINING THERAPY: CPT

## 2019-05-31 PROCEDURE — 94760 N-INVAS EAR/PLS OXIMETRY 1: CPT

## 2019-05-31 RX ADMIN — HYDROCODONE BITARTRATE AND ACETAMINOPHEN 2 TABLET: 5; 325 TABLET ORAL at 12:34

## 2019-05-31 RX ADMIN — ENOXAPARIN SODIUM 40 MG: 40 INJECTION SUBCUTANEOUS at 08:51

## 2019-05-31 RX ADMIN — SERTRALINE 50 MG: 50 TABLET, FILM COATED ORAL at 08:50

## 2019-05-31 RX ADMIN — ISOSORBIDE MONONITRATE 30 MG: 30 TABLET ORAL at 08:50

## 2019-05-31 RX ADMIN — ACETAMINOPHEN 650 MG: 325 TABLET ORAL at 08:50

## 2019-05-31 RX ADMIN — METOPROLOL TARTRATE 12.5 MG: 25 TABLET ORAL at 08:53

## 2019-05-31 RX ADMIN — SERTRALINE 50 MG: 50 TABLET, FILM COATED ORAL at 20:22

## 2019-05-31 RX ADMIN — MULTIPLE VITAMINS W/ MINERALS TAB 1 TABLET: TAB at 08:50

## 2019-05-31 RX ADMIN — IPRATROPIUM BROMIDE AND ALBUTEROL SULFATE 1 AMPULE: .5; 3 SOLUTION RESPIRATORY (INHALATION) at 18:11

## 2019-05-31 RX ADMIN — HYDROCODONE BITARTRATE AND ACETAMINOPHEN 2 TABLET: 5; 325 TABLET ORAL at 06:05

## 2019-05-31 RX ADMIN — METOPROLOL TARTRATE 12.5 MG: 25 TABLET ORAL at 20:22

## 2019-05-31 RX ADMIN — PREDNISONE 2 MG: 1 TABLET ORAL at 08:50

## 2019-05-31 RX ADMIN — IPRATROPIUM BROMIDE AND ALBUTEROL SULFATE 1 AMPULE: .5; 3 SOLUTION RESPIRATORY (INHALATION) at 13:27

## 2019-05-31 RX ADMIN — PANTOPRAZOLE SODIUM 40 MG: 40 TABLET, DELAYED RELEASE ORAL at 06:04

## 2019-05-31 RX ADMIN — CETIRIZINE HYDROCHLORIDE 5 MG: 10 TABLET, FILM COATED ORAL at 08:53

## 2019-05-31 RX ADMIN — LEVOTHYROXINE SODIUM 88 MCG: 88 TABLET ORAL at 06:05

## 2019-05-31 RX ADMIN — AZITHROMYCIN 250 MG: 250 TABLET, FILM COATED ORAL at 08:50

## 2019-05-31 RX ADMIN — HYDROCODONE BITARTRATE AND ACETAMINOPHEN 2 TABLET: 5; 325 TABLET ORAL at 22:31

## 2019-05-31 RX ADMIN — IPRATROPIUM BROMIDE AND ALBUTEROL SULFATE 1 AMPULE: .5; 3 SOLUTION RESPIRATORY (INHALATION) at 06:14

## 2019-05-31 RX ADMIN — SIMVASTATIN 20 MG: 20 TABLET, FILM COATED ORAL at 20:22

## 2019-05-31 ASSESSMENT — PAIN DESCRIPTION - DESCRIPTORS
DESCRIPTORS: ACHING
DESCRIPTORS: ACHING;CRUSHING
DESCRIPTORS: ACHING;DISCOMFORT;SHARP;SORE
DESCRIPTORS: ACHING;DISCOMFORT

## 2019-05-31 ASSESSMENT — PAIN SCALES - GENERAL
PAINLEVEL_OUTOF10: 9
PAINLEVEL_OUTOF10: 9
PAINLEVEL_OUTOF10: 0
PAINLEVEL_OUTOF10: 9
PAINLEVEL_OUTOF10: 10
PAINLEVEL_OUTOF10: 8
PAINLEVEL_OUTOF10: 8
PAINLEVEL_OUTOF10: 9
PAINLEVEL_OUTOF10: 9
PAINLEVEL_OUTOF10: 10
PAINLEVEL_OUTOF10: 0
PAINLEVEL_OUTOF10: 9

## 2019-05-31 ASSESSMENT — PAIN DESCRIPTION - ORIENTATION
ORIENTATION: RIGHT

## 2019-05-31 ASSESSMENT — PAIN DESCRIPTION - PROGRESSION
CLINICAL_PROGRESSION: NOT CHANGED

## 2019-05-31 ASSESSMENT — PAIN DESCRIPTION - LOCATION
LOCATION: HIP

## 2019-05-31 ASSESSMENT — PAIN DESCRIPTION - PAIN TYPE
TYPE: ACUTE PAIN

## 2019-05-31 ASSESSMENT — PAIN - FUNCTIONAL ASSESSMENT: PAIN_FUNCTIONAL_ASSESSMENT: PREVENTS OR INTERFERES SOME ACTIVE ACTIVITIES AND ADLS

## 2019-05-31 ASSESSMENT — PAIN DESCRIPTION - ONSET
ONSET: ON-GOING

## 2019-05-31 ASSESSMENT — PAIN DESCRIPTION - FREQUENCY
FREQUENCY: INTERMITTENT

## 2019-05-31 NOTE — PROGRESS NOTES
[facility]  [facility] SKILLED NURSING UNIT  Occupational Therapy  Daily Note  Time:   Time In: 2299  Time Out: 5811  Timed Code Treatment Minutes: 55 Minutes  Minutes: 46          Date: 2019  Patient Name: Ken Eli,   Gender: female      Room: 8E-71/071-A  MRN: [id number]  : 1933  (80 y.o.)  Referring Practitioner: Dr. Mike Tsang, Dr. Luis Huerta Attending  Diagnosis: pubic ramus fracture RIght, closed  Additional Pertinent Hx: The patient is a 80 y.o. female  With multiple medical comorbidities who was admitted to ortho services for treatment of a right pelvic fracture. Patient has a history of breast CA with mets to the liver and is currently undergoing weekly chemotherapy. She completed a treatment yesterday and notes that she usually gets dizzy afterwards. She reports a fall in her home after an episode of dizziness yesterday. She fell onto her right side landing on the right hip. She was unable to bear weight afterwards. She denies hitting her head and no LOC. X-ray shows a right superior and inferior pubic rami fracture. Patient lives alone and is typically independent. She does ambulate with a walker. CT negative for R femur fracture. Restrictions/Precautions:  Restrictions/Precautions: Weight Bearing, Fall Risk  Right Lower Extremity Weight Bearing: Weight Bearing As Tolerated    SUBJECTIVE: Up in bathroom with STNA upon arrival, agreeable to OT session, pleasant and cooperative    PAIN: 8/10: pelvis    COGNITION: WFL    ADL:   Grooming: with set-up. Combed hair sitting in bedside chair  Lower Extremity Dressing: Modified Independent. doffed B shoes sitting at EOB. BALANCE:  Standing Balance: Stand By Assistance x4 minutes with 1 UE support for bean bag toss  BED MOBILITY:  Sit to Supine: Stand By Assistance HOB elevated, used bedrail and increased time    TRANSFERS:  Sit to Stand:  Contact Guard Assistance. bedside chair  Stand to Sit: [employer].

## 2019-05-31 NOTE — PROGRESS NOTES
Patient Name: Cierra Braden        MRN: [id number]    : 1933  (80 y.o.)  Gender: female   Principal Problem: Pubic ramus fracture, right, closed, initial encounter ([facility]    Section D - Mood     Should Resident Mood Interview be Conducted? - Attempt to conduct interview with all residents   0. No (resident is rarely/never understood) à Skip to and complete -, Staff Assessment of Mood (PHQ 9-OV)  1. Yes à Continue to , Resident Mood Interview (PHQ-9) Enter Code    1   . Resident Mood Interview (PHQ-9)   Say to resident: Renee Jones the last 2 weeks, have you been bothered by any of the following problems?    If symptom is present, enter 1(yes) in column 1, Symptom Presence. Then move to column 2, Symptom Frequency, and indicate symptom frequency. 1. Symptom Presence                   2. Symptom                                                                  Frequency  0. No (enter 0 in column 2)          0. Never or 1 day          1. Yes (enter 0-3 in column 2)      1. 2-6 days           9. No Response                               2.  7-11 days                                                 3.  12-14 days   1. Symptom Presence 2. Symptom  Frequency        Enter Scores in boxes below   A. Little interest or pleasure in doing things 0 0   B. Feeling down, depressed, or hopeless 1 1   C. Trouble falling or staying asleep, or sleeping too much 1 2   D. Feeling tired or having little energy 1 2   E. Poor appetite or overeating 1 2   F. Feeling bad about yourself - or that you are a failure, or have let your family down 0 0   G. Trouble concentrating on things, such as reading the newspaper or watching television 0 0   H. Moving or speaking so slowly that other people have noticed. Or the opposite-being so fidgety or restless that s/he has been moving around a lot more than usual   0   0   I. Thoughts that you would be better off dead, or of hurting yourself in some way.  0 0

## 2019-05-31 NOTE — PROGRESS NOTES
Pt mentioned to therapy staff she has instances of lightheadedness, headaches at times when suddenly turning head, has happened at hospital and not notified staff, when speaking with pt she said she had 2 instances of lightheadedness on Thursday, Dr Sera Mcgraw notified and asked us to monitor this.

## 2019-05-31 NOTE — PLAN OF CARE
Problem: Falls - Risk of:  Goal: Will remain free from falls  Description  Will remain free from falls  Outcome: Ongoing  Gait belt and walker used while up. Pathway clear. Goal: Absence of physical injury  Description  Absence of physical injury  Outcome: Ongoing  Pathway remains clear. Gait belt and walker with transfers. Problem: Risk for Impaired Skin Integrity  Goal: Tissue integrity - skin and mucous membranes  Description  Structural intactness and normal physiological function of skin and  mucous membranes. Outcome: Ongoing  Monitoring for signs of skin breakdown. Problem: Pain:  Goal: Pain level will decrease  Description  Pain level will decrease  Outcome: Ongoing  Repositioned and encouraged rest.  Goal: Control of acute pain  Description  Control of acute pain  Outcome: Ongoing  Repositioned and encouraged rest.  Goal: Control of chronic pain  Description  Control of chronic pain  Outcome: Ongoing  Repositioned and encouraged rest.     Problem: Anxiety:  Goal: Level of anxiety will decrease  Description  Level of anxiety will decrease  Outcome: Ongoing  Explained all assessments to decrease anxiety. Problem: Bleeding:  Goal: Will show no signs and symptoms of excessive bleeding  Description  Will show no signs and symptoms of excessive bleeding  Outcome: Ongoing  No signs of excessive bleeding. Problem: Mood - Altered:  Goal: Mood stable  Description  Mood stable  Outcome: Ongoing  Calm and cooperative interactions.

## 2019-05-31 NOTE — PROGRESS NOTES
[address] [address]  INPATIENT PHYSICAL THERAPY  DAILY NOTE  - [place] SKILLED NURSING UNIT - 8E-71/071-A    Time In:   Time Out: 1426  Timed Code Treatment Minutes: 48 Minutes  Minutes: 53          Date: 2019  Patient Name: Maria A Wolf,  Gender:  female        MRN: [id number]  : 1933  (80 y.o.)     Referring Practitioner: Lin Frazier, Ordering: Dre Shaerer MD  Diagnosis: superior and inferior pubic ramus fracture, right, initial encounter   Additional Pertinent Hx: The patient is a 80 y.o. female  With multiple medical comorbidities who was admitted to ortho services for treatment of a right pelvic fracture. Patient has a history of breast CA with mets to the liver and is currently undergoing weekly chemotherapy. She completed a treatment yesterday and notes that she usually gets dizzy afterwards. She reports a fall in her home after an episode of dizziness yesterday. She fell onto her right side landing on the right hip. She was unable to bear weight afterwards. She denies hitting her head and no LOC. X-ray shows a right superior and inferior pubic rami fracture. Patient lives alone and is typically independent. She does ambulate with a walker. CT negative for R femur fracture.      Past Medical History:   Diagnosis Date    Anxiety     Blind right eye     Breast cancer metastasized to liver ([address] 05/10/2016    Liver lesion noted     Carotid stenosis      Left ICA 25%    Colostomy in place [facility]) 2016    Degenerative arthritis of cervical spine     GERD (gastroesophageal reflux disease)     Hypercholesteremia     Hypoestrogenism     Hypothyroidism     Lumbago     Lumbosacral spinal stenosis 2019    MDRO (multiple drug resistant organisms) resistance     pt stated cleared    Personal history of cardiac dysrhythmia     Sigmoid diverticulosis     Spondylolisthesis of lumbosacral region     Steroid-induced hyperglycemia     Subclavian artery stenosis

## 2019-05-31 NOTE — PROGRESS NOTES
Family Medicine Progress Notes/Coverage    Today's Date: 5/31/19  8E-71/071-A  Medical Record # [id number]  Account # [de-identified]      Ms. Rod admitted on 5/24/2019        Subjective / Interval History :     Pain 8/10 but better with pain meds , goes down to 5/10  PT/OT is ongoing and tolerating it well  Reviewed notes, consults, laboratory and radiology results,    Objective:       Physical Exam:  Patient Vitals for the past 24 hrs:   BP Temp Temp src Pulse Resp SpO2   05/31/19 0743 (!) 163/66 98.2 °F (36.8 °C) Oral 65 18 92 %   05/30/19 2045 (!) 152/65 97.9 °F (36.6 °C) Oral 60 20 96 %   05/30/19 1000 (!) 152/74 -- -- -- -- --       General Appearance:  Alert, cooperative, no distress, appears stated age   [de-identified]:  Neck:      Chest/Lungs:  Heart: CTA , few crackle at right lung base  RRR   Abdomen:  Back: soft   Extremities:  Neurological Exam: No edema  WNL, no dysarthria, no deficit       Assessment:     Admitting Diagnosis:    right pelvic fracture    Active Hospital Problems    Diagnosis Date Noted    Superior and Inferior Pubic ramus fracture, right, closed, initial encounter [facility]) [facility] 05/21/2019     Priority: High    Anxiety [F41.9] 06/29/2016     Priority: High    Breast cancer metastasized to liver ([facility] [address] [C50.919, C78.7] 06/08/2016     Priority: High    On antineoplastic chemotherapy [Z79.899] 05/25/2019     Priority: Medium    Lumbosacral spinal stenosis [M48.07] 05/01/2019     Priority: Medium    Personal history of cardiac dysrhythmia [Z86.79]      Priority: Medium    Current chronic use of systemic steroids [Z79.52] 09/04/2018     Priority: Medium    H/O: CVA (cerebrovascular accident) [Z86.73] 11/06/2017     Priority: Medium    Coronary artery disease involving native coronary artery of native heart without angina pectoris [I25.10] 11/06/2017 Priority: Medium    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]      Priority: Medium    Hypothyroidism due to acquired atrophy of thyroid [E03.4] 03/03/2016     Priority: Medium    Hypercholesteremia [E78.00]      Priority: Medium    Temporal arteritis ([address] [M31.6]      Priority: Medium    GERD (gastroesophageal reflux disease) [K21.9]      Priority: Medium       Plan:     Discussed plans with the nursing staff  Continue present treatment    Medications, Laboratories and Imaging results:    Scheduled Meds:   ipratropium-albuterol  1 ampule Inhalation Q6H WA    [START ON 6/9/2019] clopidogrel  75 mg Oral Daily    [START ON 6/9/2019] aspirin  325 mg Oral Daily    enoxaparin  40 mg Subcutaneous Daily    cetirizine  5 mg Oral Daily    isosorbide mononitrate  30 mg Oral Daily    levothyroxine  88 mcg Oral Daily    metoprolol tartrate  12.5 mg Oral BID    pantoprazole  40 mg Oral QAM AC    ppd   Does not apply Weekly    predniSONE  2 mg Oral Daily    sertraline  50 mg Oral BID    simvastatin  20 mg Oral Nightly    therapeutic multivitamin-minerals  1 tablet Oral Daily    tuberculin  5 Units Intradermal Weekly     Continuous Infusions:  PRN Meds:acetaminophen, HYDROcodone 5 mg - acetaminophen **OR** HYDROcodone 5 mg - acetaminophen, magnesium hydroxide, ALPRAZolam    Imaging:    Lab Review :    Lab Results   Component Value Date    WBC 8.5 05/29/2019    HGB 9.2 (L) 05/29/2019    HCT 30.3 (L) 05/29/2019    MCV 91.3 05/29/2019     05/29/2019     Lab Results   Component Value Date    CREATININE 0.7 05/29/2019    BUN 20 05/29/2019     05/29/2019    K 4.4 05/29/2019     05/29/2019    CO2 27 05/29/2019     Lab Results   Component Value Date    CKTOTAL 41 10/16/2016     Lab Results   Component Value Date    ALT 15 12/22/2018    AST 14 12/22/2018    ALKPHOS 53 12/22/2018    BILITOT 0.6 12/22/2018     Lab Results   Component Value Date    LIPASE 11.4 12/22/2018         Electronically signed by Beth Thomas MD [place] on 5/31/19 at 9:25 Mell Baires M.D.

## 2019-06-01 PROCEDURE — 6370000000 HC RX 637 (ALT 250 FOR IP): Performed by: EMERGENCY MEDICINE

## 2019-06-01 PROCEDURE — 1290000000 HC SEMI PRIVATE OTHER R&B

## 2019-06-01 PROCEDURE — 6360000002 HC RX W HCPCS: Performed by: ORTHOPAEDIC SURGERY

## 2019-06-01 PROCEDURE — 6370000000 HC RX 637 (ALT 250 FOR IP): Performed by: ORTHOPAEDIC SURGERY

## 2019-06-01 PROCEDURE — 94640 AIRWAY INHALATION TREATMENT: CPT

## 2019-06-01 RX ADMIN — PANTOPRAZOLE SODIUM 40 MG: 40 TABLET, DELAYED RELEASE ORAL at 05:26

## 2019-06-01 RX ADMIN — CETIRIZINE HYDROCHLORIDE 5 MG: 10 TABLET, FILM COATED ORAL at 09:57

## 2019-06-01 RX ADMIN — ISOSORBIDE MONONITRATE 30 MG: 30 TABLET ORAL at 09:57

## 2019-06-01 RX ADMIN — SIMVASTATIN 20 MG: 20 TABLET, FILM COATED ORAL at 21:14

## 2019-06-01 RX ADMIN — HYDROCODONE BITARTRATE AND ACETAMINOPHEN 2 TABLET: 5; 325 TABLET ORAL at 21:15

## 2019-06-01 RX ADMIN — PREDNISONE 2 MG: 1 TABLET ORAL at 09:57

## 2019-06-01 RX ADMIN — MULTIPLE VITAMINS W/ MINERALS TAB 1 TABLET: TAB at 09:58

## 2019-06-01 RX ADMIN — HYDROCODONE BITARTRATE AND ACETAMINOPHEN 2 TABLET: 5; 325 TABLET ORAL at 15:08

## 2019-06-01 RX ADMIN — METOPROLOL TARTRATE 12.5 MG: 25 TABLET ORAL at 21:14

## 2019-06-01 RX ADMIN — IPRATROPIUM BROMIDE AND ALBUTEROL SULFATE 1 AMPULE: .5; 3 SOLUTION RESPIRATORY (INHALATION) at 18:06

## 2019-06-01 RX ADMIN — SERTRALINE 50 MG: 50 TABLET, FILM COATED ORAL at 21:14

## 2019-06-01 RX ADMIN — IPRATROPIUM BROMIDE AND ALBUTEROL SULFATE 1 AMPULE: .5; 3 SOLUTION RESPIRATORY (INHALATION) at 12:14

## 2019-06-01 RX ADMIN — LEVOTHYROXINE SODIUM 88 MCG: 88 TABLET ORAL at 05:26

## 2019-06-01 RX ADMIN — SERTRALINE 50 MG: 50 TABLET, FILM COATED ORAL at 09:58

## 2019-06-01 RX ADMIN — METOPROLOL TARTRATE 12.5 MG: 25 TABLET ORAL at 09:57

## 2019-06-01 RX ADMIN — IPRATROPIUM BROMIDE AND ALBUTEROL SULFATE 1 AMPULE: .5; 3 SOLUTION RESPIRATORY (INHALATION) at 05:26

## 2019-06-01 RX ADMIN — HYDROCODONE BITARTRATE AND ACETAMINOPHEN 2 TABLET: 5; 325 TABLET ORAL at 06:48

## 2019-06-01 RX ADMIN — ENOXAPARIN SODIUM 40 MG: 40 INJECTION SUBCUTANEOUS at 09:58

## 2019-06-01 ASSESSMENT — PAIN SCALES - GENERAL
PAINLEVEL_OUTOF10: 9
PAINLEVEL_OUTOF10: 10
PAINLEVEL_OUTOF10: 10
PAINLEVEL_OUTOF10: 8
PAINLEVEL_OUTOF10: 10
PAINLEVEL_OUTOF10: 3

## 2019-06-01 ASSESSMENT — PAIN DESCRIPTION - LOCATION: LOCATION: HIP

## 2019-06-01 ASSESSMENT — PAIN DESCRIPTION - PAIN TYPE: TYPE: ACUTE PAIN

## 2019-06-01 ASSESSMENT — PAIN DESCRIPTION - ORIENTATION: ORIENTATION: RIGHT

## 2019-06-01 ASSESSMENT — PAIN DESCRIPTION - DESCRIPTORS: DESCRIPTORS: ACHING

## 2019-06-01 NOTE — PLAN OF CARE
Problem: Impaired respiratory status  Goal: Clear lung sounds  6/1/2019 1227 by Torrie Brand RCP  Outcome: Met This Shift     Problem: Falls - Risk of:  Goal: Will remain free from falls  Description  Will remain free from falls  Outcome: Ongoing  Note:   Fall precautions in place, up with assist and walker      Problem: Falls - Risk of:  Goal: Absence of physical injury  Description  Absence of physical injury  Outcome: Ongoing  Note:   No injury      Problem: Risk for Impaired Skin Integrity  Goal: Tissue integrity - skin and mucous membranes  Description  Structural intactness and normal physiological function of skin and  mucous membranes.   Outcome: Ongoing  Note:   Turn and reposition, waffle cushion in chair      Problem: Pain:  Goal: Pain level will decrease  Description  Pain level will decrease  Outcome: Ongoing  Note:   Prn norco, ice pack applied     Problem: Anxiety:  Goal: Level of anxiety will decrease  Description  Level of anxiety will decrease  Outcome: Ongoing  Note:   Calm environment     Problem: Bleeding:  Goal: Will show no signs and symptoms of excessive bleeding  Description  Will show no signs and symptoms of excessive bleeding  Outcome: Ongoing  Note:   No active bleeding ,lovenox continues      Problem: Serum Glucose Level - Abnormal:  Goal: Ability to maintain appropriate glucose levels will improve  Description  Ability to maintain appropriate glucose levels will improve  Outcome: Ongoing  Note:   Monitor blood sugar as ordered, continues as prednisone     Problem: Cardiac:  Goal: Ability to maintain vital signs within normal range will improve  Description  Ability to maintain vital signs within normal range will improve  Outcome: Ongoing  Note:   Monitor vs as ordered      Problem: Mood - Altered:  Goal: Mood stable  Description  Mood stable  Outcome: Ongoing  Note:   Calm mood, quiet environment      Problem: Nutrition  Goal: Optimal nutrition therapy  Outcome: Ongoing  Note: Monitor intake and output ,monitor weight as ordered.

## 2019-06-02 PROCEDURE — 2500000003 HC RX 250 WO HCPCS: Performed by: ORTHOPAEDIC SURGERY

## 2019-06-02 PROCEDURE — 1290000000 HC SEMI PRIVATE OTHER R&B

## 2019-06-02 PROCEDURE — 6370000000 HC RX 637 (ALT 250 FOR IP): Performed by: EMERGENCY MEDICINE

## 2019-06-02 PROCEDURE — 6360000002 HC RX W HCPCS: Performed by: ORTHOPAEDIC SURGERY

## 2019-06-02 PROCEDURE — 6370000000 HC RX 637 (ALT 250 FOR IP): Performed by: ORTHOPAEDIC SURGERY

## 2019-06-02 PROCEDURE — 97110 THERAPEUTIC EXERCISES: CPT

## 2019-06-02 PROCEDURE — 97116 GAIT TRAINING THERAPY: CPT

## 2019-06-02 PROCEDURE — 94640 AIRWAY INHALATION TREATMENT: CPT

## 2019-06-02 PROCEDURE — 97535 SELF CARE MNGMENT TRAINING: CPT

## 2019-06-02 RX ADMIN — IPRATROPIUM BROMIDE AND ALBUTEROL SULFATE 1 AMPULE: .5; 3 SOLUTION RESPIRATORY (INHALATION) at 05:46

## 2019-06-02 RX ADMIN — MULTIPLE VITAMINS W/ MINERALS TAB 1 TABLET: TAB at 09:16

## 2019-06-02 RX ADMIN — HYDROCODONE BITARTRATE AND ACETAMINOPHEN 2 TABLET: 5; 325 TABLET ORAL at 04:08

## 2019-06-02 RX ADMIN — METOPROLOL TARTRATE 12.5 MG: 25 TABLET ORAL at 21:56

## 2019-06-02 RX ADMIN — PANTOPRAZOLE SODIUM 40 MG: 40 TABLET, DELAYED RELEASE ORAL at 05:39

## 2019-06-02 RX ADMIN — SERTRALINE 50 MG: 50 TABLET, FILM COATED ORAL at 09:16

## 2019-06-02 RX ADMIN — HYDROCODONE BITARTRATE AND ACETAMINOPHEN 2 TABLET: 5; 325 TABLET ORAL at 18:01

## 2019-06-02 RX ADMIN — SIMVASTATIN 20 MG: 20 TABLET, FILM COATED ORAL at 21:56

## 2019-06-02 RX ADMIN — ENOXAPARIN SODIUM 40 MG: 40 INJECTION SUBCUTANEOUS at 09:17

## 2019-06-02 RX ADMIN — HYDROCODONE BITARTRATE AND ACETAMINOPHEN 2 TABLET: 5; 325 TABLET ORAL at 09:16

## 2019-06-02 RX ADMIN — METOPROLOL TARTRATE 12.5 MG: 25 TABLET ORAL at 09:16

## 2019-06-02 RX ADMIN — ALPRAZOLAM 0.5 MG: 0.5 TABLET ORAL at 09:16

## 2019-06-02 RX ADMIN — SERTRALINE 50 MG: 50 TABLET, FILM COATED ORAL at 21:56

## 2019-06-02 RX ADMIN — ISOSORBIDE MONONITRATE 30 MG: 30 TABLET ORAL at 09:15

## 2019-06-02 RX ADMIN — Medication 5 UNITS: at 18:01

## 2019-06-02 RX ADMIN — HYDROCODONE BITARTRATE AND ACETAMINOPHEN 2 TABLET: 5; 325 TABLET ORAL at 21:56

## 2019-06-02 RX ADMIN — CETIRIZINE HYDROCHLORIDE 5 MG: 10 TABLET, FILM COATED ORAL at 09:15

## 2019-06-02 RX ADMIN — IPRATROPIUM BROMIDE AND ALBUTEROL SULFATE 1 AMPULE: .5; 3 SOLUTION RESPIRATORY (INHALATION) at 13:57

## 2019-06-02 RX ADMIN — PREDNISONE 2 MG: 1 TABLET ORAL at 09:16

## 2019-06-02 RX ADMIN — LEVOTHYROXINE SODIUM 88 MCG: 88 TABLET ORAL at 05:39

## 2019-06-02 ASSESSMENT — PAIN SCALES - GENERAL
PAINLEVEL_OUTOF10: 10
PAINLEVEL_OUTOF10: 0
PAINLEVEL_OUTOF10: 9
PAINLEVEL_OUTOF10: 9
PAINLEVEL_OUTOF10: 10
PAINLEVEL_OUTOF10: 9

## 2019-06-02 ASSESSMENT — PAIN DESCRIPTION - PAIN TYPE: TYPE: ACUTE PAIN

## 2019-06-02 ASSESSMENT — PAIN DESCRIPTION - LOCATION: LOCATION: HIP

## 2019-06-02 NOTE — PROGRESS NOTES
Physical Therapy   [facility]  [facility] SKILLED NURSING UNIT - 8E-71/071-A    Time In: 09  Time Out: 0930  Timed Code Treatment Minutes: 28 Minutes  Minutes: 28          Date: 2019  Patient Name: Nelson Hanson,  Gender:  female        MRN: [id number]  : 1933  (80 y.o.)     Referring Practitioner: Carroll Dooley, Ordering: Gayathri Barba MD  Diagnosis: superior and inferior pubic ramus fracture, right, initial encounter   Additional Pertinent Hx: The patient is a 80 y.o. female  With multiple medical comorbidities who was admitted to ortho services for treatment of a right pelvic fracture. Patient has a history of breast CA with mets to the liver and is currently undergoing weekly chemotherapy. She completed a treatment yesterday and notes that she usually gets dizzy afterwards. She reports a fall in her home after an episode of dizziness yesterday. She fell onto her right side landing on the right hip. She was unable to bear weight afterwards. She denies hitting her head and no LOC. X-ray shows a right superior and inferior pubic rami fracture. Patient lives alone and is typically independent. She does ambulate with a walker. CT negative for R femur fracture.      Past Medical History:   Diagnosis Date    Anxiety     Blind right eye     Breast cancer metastasized to liver ([address] 05/10/2016    Liver lesion noted     Carotid stenosis      Left ICA 25%    Col[facility] in place [facility]) 2016    Degenerative arthritis of cervical spine     GERD (gastroesophageal reflux disease)     Hypercholesteremia     Hypoestrogenism     Hypothyroidism     Lumbago     Lumbosacral spinal stenosis 2019    MDRO (multiple drug resistant organisms) resistance     pt stated cleared    Personal history of cardiac dysrhythmia     Sigmoid diverticulosis     Spondylolisthesis of lumbosacral region     Steroid-induced hyperglycemia     Subclavian artery stenosis (HCC)     left    Superior and Inferior Pubic ramus fracture, right, closed, initial encounter [facility] 05/21/2019    SVT (supraventricular tachycardia) ([facility] 6/07    Temporal arteritis ([facility]     Vertebral artery occlusion     left    Vitamin D deficiency 06/2017     Past Surgical History:   Procedure Laterality Date    CARDIAC CATHETERIZATION  5/07    CATARACT REMOVAL  right 1/08; left 2/08    CHOLECYSTECTOMY  1/03    COLONOSCOPY  11/06    COLOSTOMY  09/04/2018    REVERSAL OF COLOSTOMY    DILATION AND CURETTAGE OF UTERUS      ENDOSCOPY, COLON, DIAGNOSTIC      EYE SURGERY      EYE SURGERY Right 11/06/2017    Dr Patria Sauceda in [address]      partial    OTHER SURGICAL HISTORY  05/27/2016    robotic assisted laparoscopic anterior colon resection and end colostomy    [place] OFFICE/OUTPT VISIT,PROCEDURE ONLY N/A 9/4/2018    COLOSTOMY REVERSAL AND PARASTOMAL HERNIA REPAIR performed by Maricruz Gonzaelz MD at [address] [facility]  2002    UPPER GASTROINTESTINAL ENDOSCOPY  11/06       Restrictions/Precautions:  Weight Bearing, Fall Risk     Right Lower Extremity Weight Bearing: Weight Bearing As Tolerated       Prior Level of Function:  ADL Assistance: Needs assistance(HH aide assists pt 5 days a week x 2 hours.)  Homemaking Assistance: Needs assistance(aide assists with housekeeping and grocery shopping.)  Ambulation Assistance: Independent  Transfer Assistance: Independent  Additional Comments: Pt states amb with RW, O2 at night; has aide services 5 days/week that assists with laundry, showers and grocery shopping. Subjective:  Chart Reviewed: Yes  Response To Previous Treatment: Patient with no complaints from previous session. Family / Caregiver Present: No  Subjective: Pt up in restroom upon arrival noting she is having a lot of pain. With some encourgement agreeable to there ex in chair. RN in to give meds. Pain:  Yes.   Pain Assessment  Pain Level: 9  Pain Program, Equipment Evaluation, Education, & procurement, Safety Education & Training, Patient/Caregiver Education & Training, Endurance Training    Goals:  Patient goals : to get stronger    Short term goals  Time Frame for Short term goals: 1 week  Short term goal 1: Pt to transfer supine <--> sit MINx1 to enable pt to get in/out of bed. Short term goal 2: Pt to transfer sit <--> stand CGA for increased functional mobility. Short term goal 3: Pt to ambulate >15 feet for household ambulation with use of RW and CGA  Short term goal 4: Patient able to stand with F+ balance in order to decrease risk of future falls    Long term goals  Time Frame for Long term goals : 2 weeks  Long term goal 1: Patient will be able to perform bed mobility CGA to get in/out of bed  Long term goal 2: Patient will be able to perform functional transfers SBA to sit in bedside chair  Long term goal 3: Patient will be able to ambulate up to 50 feet with RW and CGA to walk around home safely  Long term goal 4: Patient able to stand with G balance in order to decrease risk of future falls  If patient is discharged prior to progress note completion, this note is to serve as the discharge note with all goals being unmet unless indicated otherwise.

## 2019-06-02 NOTE — PROGRESS NOTES
Елена Tomlinson [address] [address]  [address]  Occupational Therapy  Daily Note  Time:   Time In: 1  Time Out: 1101  Timed Code Treatment Minutes: 26 Minutes  Minutes: 26          Date: 2019  Patient Name: Emilie Brown,   Gender: female      Room: -71/071-A  MRN: [id number]  : 1933  (80 y.o.)  Referring Practitioner: Dr. Mayra Lang, Dr. Brian Huerta Attending  Diagnosis: pubic ramus fracture RIght, closed  Additional Pertinent Hx: The patient is a 80 y.o. female  With multiple medical comorbidities who was admitted to ortho services for treatment of a right pelvic fracture. Patient has a history of breast CA with mets to the liver and is currently undergoing weekly chemotherapy. She completed a treatment yesterday and notes that she usually gets dizzy afterwards. She reports a fall in her home after an episode of dizziness yesterday. She fell onto her right side landing on the right hip. She was unable to bear weight afterwards. She denies hitting her head and no LOC. X-ray shows a right superior and inferior pubic rami fracture. Patient lives alone and is typically independent. She does ambulate with a walker. CT negative for R femur fracture. Restrictions/Precautions:  Restrictions/Precautions: Weight Bearing, Fall Risk  Right Lower Extremity Weight Bearing: Weight Bearing As Tolerated    SUBJECTIVE: pt lying in bed when arrived. Pt agreeable to OT    PAIN: 9/10: R LE     COGNITION: WFL    ADL:   Lower Extremity Dressing: Minimal Assistance. to slip shoes on sitting EOB . Tolieting: SBA   Tolieting transfer: CGA onto RTS     BALANCE:  Sitting Balance:  Contact Guard Assistance  Standing Balance: Contact Guard Assistance    BED MOBILITY:  Supine to Sit: Modified Independent     TRANSFERS:  Sit to Stand:  Contact Guard Assistance. from EOB and RTS   Stand to Sit: Contact Guard Assistance.  to RTS and arm chair and recliner     FUNCTIONAL MOBILITY:  Assistive Device: Rolling with funcitonal transfers and ADLs  Long term goals  Time Frame for Long term goals : 3-4 weeks  Long term goal 1: PT will complete ADL routine with no cues for safe and adapted tech with S for completion to increase independence in self care tasks  Long term goal 2: PT will complete 2 step homemaking tasks with S and no cues for problem solving or attention to task to be able to prepare a simple meal     Following session, patient left in safe position with all fall risk precautions in place.

## 2019-06-03 ENCOUNTER — APPOINTMENT (OUTPATIENT)
Dept: GENERAL RADIOLOGY | Age: 84
DRG: 561 | End: 2019-06-03
Attending: EMERGENCY MEDICINE
Payer: MEDICARE

## 2019-06-03 LAB
ANION GAP SERPL CALCULATED.3IONS-SCNC: 12 MEQ/L (ref 8–16)
BASOPHILS # BLD: 0.1 %
BASOPHILS ABSOLUTE: 0 THOU/MM3 (ref 0–0.1)
BUN BLDV-MCNC: 21 MG/DL (ref 7–22)
CALCIUM SERPL-MCNC: 8.8 MG/DL (ref 8.5–10.5)
CHLORIDE BLD-SCNC: 104 MEQ/L (ref 98–111)
CO2: 26 MEQ/L (ref 23–33)
CREAT SERPL-MCNC: 0.8 MG/DL (ref 0.4–1.2)
EOSINOPHIL # BLD: 2.3 %
EOSINOPHILS ABSOLUTE: 0.2 THOU/MM3 (ref 0–0.4)
ERYTHROCYTE [DISTWIDTH] IN BLOOD BY AUTOMATED COUNT: 17 % (ref 11.5–14.5)
ERYTHROCYTE [DISTWIDTH] IN BLOOD BY AUTOMATED COUNT: 56.5 FL (ref 35–45)
GFR SERPL CREATININE-BSD FRML MDRD: 68 ML/MIN/1.73M2
GLUCOSE BLD-MCNC: 91 MG/DL (ref 70–108)
HCT VFR BLD CALC: 32.9 % (ref 37–47)
HEMOGLOBIN: 9.9 GM/DL (ref 12–16)
IMMATURE GRANS (ABS): 0.12 THOU/MM3 (ref 0–0.07)
IMMATURE GRANULOCYTES: 1.3 %
LYMPHOCYTES # BLD: 29.6 %
LYMPHOCYTES ABSOLUTE: 2.7 THOU/MM3 (ref 1–4.8)
MCH RBC QN AUTO: 27.7 PG (ref 26–33)
MCHC RBC AUTO-ENTMCNC: 30.1 GM/DL (ref 32.2–35.5)
MCV RBC AUTO: 91.9 FL (ref 81–99)
MONOCYTES # BLD: 8.1 %
MONOCYTES ABSOLUTE: 0.7 THOU/MM3 (ref 0.4–1.3)
NUCLEATED RED BLOOD CELLS: 0 /100 WBC
PLATELET # BLD: 201 THOU/MM3 (ref 130–400)
PMV BLD AUTO: 11 FL (ref 9.4–12.4)
POTASSIUM SERPL-SCNC: 4.3 MEQ/L (ref 3.5–5.2)
RBC # BLD: 3.58 MILL/MM3 (ref 4.2–5.4)
SEG NEUTROPHILS: 58.6 %
SEGMENTED NEUTROPHILS ABSOLUTE COUNT: 5.3 THOU/MM3 (ref 1.8–7.7)
SODIUM BLD-SCNC: 142 MEQ/L (ref 135–145)
WBC # BLD: 9 THOU/MM3 (ref 4.8–10.8)

## 2019-06-03 PROCEDURE — 6370000000 HC RX 637 (ALT 250 FOR IP): Performed by: ORTHOPAEDIC SURGERY

## 2019-06-03 PROCEDURE — 97530 THERAPEUTIC ACTIVITIES: CPT

## 2019-06-03 PROCEDURE — 80048 BASIC METABOLIC PNL TOTAL CA: CPT

## 2019-06-03 PROCEDURE — 94640 AIRWAY INHALATION TREATMENT: CPT

## 2019-06-03 PROCEDURE — 97535 SELF CARE MNGMENT TRAINING: CPT

## 2019-06-03 PROCEDURE — 97116 GAIT TRAINING THERAPY: CPT

## 2019-06-03 PROCEDURE — 6370000000 HC RX 637 (ALT 250 FOR IP): Performed by: EMERGENCY MEDICINE

## 2019-06-03 PROCEDURE — 97110 THERAPEUTIC EXERCISES: CPT

## 2019-06-03 PROCEDURE — 6360000002 HC RX W HCPCS: Performed by: ORTHOPAEDIC SURGERY

## 2019-06-03 PROCEDURE — 85025 COMPLETE CBC W/AUTO DIFF WBC: CPT

## 2019-06-03 PROCEDURE — 36415 COLL VENOUS BLD VENIPUNCTURE: CPT

## 2019-06-03 PROCEDURE — 71045 X-RAY EXAM CHEST 1 VIEW: CPT

## 2019-06-03 PROCEDURE — 1290000000 HC SEMI PRIVATE OTHER R&B

## 2019-06-03 PROCEDURE — 92523 SPEECH SOUND LANG COMPREHEN: CPT

## 2019-06-03 RX ORDER — LORATADINE 10 MG/1
TABLET ORAL
Qty: 90 TABLET | Refills: 1 | Status: SHIPPED | OUTPATIENT
Start: 2019-06-03 | End: 2019-06-12 | Stop reason: HOSPADM

## 2019-06-03 RX ORDER — IPRATROPIUM BROMIDE AND ALBUTEROL SULFATE 2.5; .5 MG/3ML; MG/3ML
1 SOLUTION RESPIRATORY (INHALATION) EVERY 4 HOURS PRN
Status: DISCONTINUED | OUTPATIENT
Start: 2019-06-03 | End: 2019-06-12 | Stop reason: HOSPADM

## 2019-06-03 RX ORDER — PANTOPRAZOLE SODIUM 40 MG/1
40 TABLET, DELAYED RELEASE ORAL
Status: DISCONTINUED | OUTPATIENT
Start: 2019-06-03 | End: 2019-06-12 | Stop reason: HOSPADM

## 2019-06-03 RX ADMIN — SERTRALINE 50 MG: 50 TABLET, FILM COATED ORAL at 21:59

## 2019-06-03 RX ADMIN — IPRATROPIUM BROMIDE AND ALBUTEROL SULFATE 1 AMPULE: .5; 3 SOLUTION RESPIRATORY (INHALATION) at 11:40

## 2019-06-03 RX ADMIN — IPRATROPIUM BROMIDE AND ALBUTEROL SULFATE 1 AMPULE: .5; 3 SOLUTION RESPIRATORY (INHALATION) at 04:31

## 2019-06-03 RX ADMIN — ENOXAPARIN SODIUM 40 MG: 40 INJECTION SUBCUTANEOUS at 09:34

## 2019-06-03 RX ADMIN — PANTOPRAZOLE SODIUM 40 MG: 40 TABLET, DELAYED RELEASE ORAL at 05:58

## 2019-06-03 RX ADMIN — PANTOPRAZOLE SODIUM 40 MG: 40 TABLET, DELAYED RELEASE ORAL at 17:08

## 2019-06-03 RX ADMIN — PREDNISONE 2 MG: 1 TABLET ORAL at 09:33

## 2019-06-03 RX ADMIN — MULTIPLE VITAMINS W/ MINERALS TAB 1 TABLET: TAB at 09:33

## 2019-06-03 RX ADMIN — ALPRAZOLAM 0.5 MG: 0.5 TABLET ORAL at 09:33

## 2019-06-03 RX ADMIN — METOPROLOL TARTRATE 12.5 MG: 25 TABLET ORAL at 21:14

## 2019-06-03 RX ADMIN — HYDROCODONE BITARTRATE AND ACETAMINOPHEN 2 TABLET: 5; 325 TABLET ORAL at 04:15

## 2019-06-03 RX ADMIN — SERTRALINE 50 MG: 50 TABLET, FILM COATED ORAL at 09:33

## 2019-06-03 RX ADMIN — CETIRIZINE HYDROCHLORIDE 5 MG: 10 TABLET, FILM COATED ORAL at 09:33

## 2019-06-03 RX ADMIN — HYDROCODONE BITARTRATE AND ACETAMINOPHEN 1 TABLET: 5; 325 TABLET ORAL at 21:14

## 2019-06-03 RX ADMIN — ISOSORBIDE MONONITRATE 30 MG: 30 TABLET ORAL at 09:33

## 2019-06-03 RX ADMIN — IPRATROPIUM BROMIDE AND ALBUTEROL SULFATE 1 AMPULE: .5; 3 SOLUTION RESPIRATORY (INHALATION) at 17:50

## 2019-06-03 RX ADMIN — METOPROLOL TARTRATE 12.5 MG: 25 TABLET ORAL at 09:37

## 2019-06-03 RX ADMIN — LEVOTHYROXINE SODIUM 88 MCG: 88 TABLET ORAL at 05:58

## 2019-06-03 RX ADMIN — SIMVASTATIN 20 MG: 20 TABLET, FILM COATED ORAL at 21:14

## 2019-06-03 RX ADMIN — HYDROCODONE BITARTRATE AND ACETAMINOPHEN 2 TABLET: 5; 325 TABLET ORAL at 09:33

## 2019-06-03 ASSESSMENT — PAIN DESCRIPTION - LOCATION
LOCATION: BACK;HIP
LOCATION: BUTTOCKS
LOCATION: BACK;HIP

## 2019-06-03 ASSESSMENT — PAIN SCALES - GENERAL
PAINLEVEL_OUTOF10: 7
PAINLEVEL_OUTOF10: 0
PAINLEVEL_OUTOF10: 8
PAINLEVEL_OUTOF10: 5
PAINLEVEL_OUTOF10: 10
PAINLEVEL_OUTOF10: 0

## 2019-06-03 ASSESSMENT — PAIN DESCRIPTION - DESCRIPTORS: DESCRIPTORS: SHOOTING;SHARP

## 2019-06-03 ASSESSMENT — PAIN DESCRIPTION - PAIN TYPE: TYPE: ACUTE PAIN

## 2019-06-03 ASSESSMENT — PAIN DESCRIPTION - PROGRESSION: CLINICAL_PROGRESSION: NOT CHANGED

## 2019-06-03 ASSESSMENT — PAIN DESCRIPTION - ORIENTATION
ORIENTATION: RIGHT
ORIENTATION: RIGHT

## 2019-06-03 ASSESSMENT — PAIN DESCRIPTION - FREQUENCY: FREQUENCY: INTERMITTENT

## 2019-06-03 ASSESSMENT — PAIN DESCRIPTION - ONSET: ONSET: ON-GOING

## 2019-06-03 NOTE — PROGRESS NOTES
stenosis ([address]     left    Superior and Inferior Pubic ramus fracture, right, closed, initial encounter ([address] 05/21/2019    SVT (supraventricular tachycardia) ([address] 6/07    Temporal arteritis ([address]     Vertebral artery occlusion     left    Vitamin D deficiency 06/2017     Past Surgical History:   Procedure Laterality Date    CARDIAC CATHETERIZATION  5/07    CATARACT REMOVAL  right 1/08; left 2/08    CHOLECYSTECTOMY  1/03    COLONOSCOPY  11/06    COLOSTOMY  09/04/2018    REVERSAL OF COLOSTOMY    DILATION AND CURETTAGE OF UTERUS      ENDOSCOPY, COLON, DIAGNOSTIC      EYE SURGERY      EYE SURGERY Right 11/06/2017    Dr Lopez in [address]      partial    OTHER SURGICAL HISTORY  05/27/2016    robotic assisted laparoscopic anterior colon resection and end colostomy    [place] OFFICE/OUTPT VISIT,PROCEDURE ONLY N/A 9/4/2018    COLOSTOMY REVERSAL AND PARASTOMAL HERNIA REPAIR performed by Nilda Traylor MD at [address] [facility]  2002    UPPER GASTROINTESTINAL ENDOSCOPY  11/06       Restrictions/Precautions:  Weight Bearing, Fall Risk     Right Lower Extremity Weight Bearing: Weight Bearing As Tolerated                      Prior Level of Function:  ADL Assistance: Needs assistance(HH aide assists pt 5 days a week x 2 hours.)  Homemaking Assistance: Needs assistance(aide assists with housekeeping and grocery shopping.)  Ambulation Assistance: Independent  Transfer Assistance: Independent  Additional Comments: Pt states amb with RW, O2 at night; has aide services 5 days/week that assists with laundry, showers and grocery shopping. Subjective:  Family / Caregiver Present: No  Comments: alot of instability present in pt's right LE this am with knee buckling freq. pt. reports she keeps getting a sharp/shooting pain through her right buttocks that is causing her R knee to buckle  Subjective: pt. in bed upon arrival, agreeable for therapy.  alot of confusion and forgetfulness present throughout session    Pain:   .  Pain Assessment  Pain Level: 0(\"not bad\"  ? ??)  Pain Type: Acute pain  Pain Location: Buttocks(right side)  Pain Descriptors: Shooting; Sharp  Pain Frequency: Intermittent  Pain Onset: On-going  Clinical Progression: Not changed       Social/Functional:  Lives With: Alone  Type of Home: House  Home Layout: One level  Home Access: Level entry  Home Equipment: Rolling walker, Oxygen     Objective:  Rolling to Left: Stand by assistance  Supine to Sit: Stand by assistance(hob up ~30 degrees with use of rail, very slow moving)  Sit to Supine: Unable to assess  Scooting: Stand by assistance(scoot to edge of sitting surfaces)    Transfers  Sit to Stand: Contact guard assistance;Minimal Assistance(fluctuated from cga-> min a )  Stand to sit: Stand by assistance;Contact guard assistance(occasional vc for proper body positioning upon reaching back to sit)  Car Transfer: Contact guard assistance(cga stand<-> sit , otherwise sba (self assist with b le's in/out of car.))       Ambulation 1  Surface: level tile;carpet;uneven  Device: Rolling Walker  Other Apparatus: Wheelchair follow  Assistance: Contact guard assistance  Quality of Gait: Slow, Decreased B step lengths and ht. and decreased stance on R LE. forward flexed posture, vc to amb closer to walker, freq. instability right knee Min instability but no LOB  Gait Deviations: Slow Gillian;Decreased step length  Distance: 15' x 1, 30' x 1, 15' x 1, 25' x 1, 30' x 1, 35' x 1  Comments: increased unsteadiness and instability right knee this am,. Stairs  Curbs: 6\"(porch step)  Device: Rolling walker  Assistance: Minimal assistance;Contact guard assistance(min -> lt. mod a up and cga down)  Comment: Cuing for technique. Exercises:  Exercises  Comments:  standing in // bars with b ue support and cga 10-12 reps each b le heelraises, marching, hip abd/add, partial squats all to increase strength for improved functional mobility. Activity Tolerance:  Activity Tolerance: Patient limited by cognitive status; Patient Tolerated treatment well  Activity Tolerance: increased confusion and forgetfulness this am.    Discharge Recommendations:  Discharge Recommendations: 24 hour supervision or assist, Patient would benefit from continued therapy after discharge(alot of safety issues if to return home alone)    Patient Education:  Patient Education: POC,  6-3-19: car transfer, porch step with rolling walker,    Equipment Recommendations:  Equipment Needed: No    Safety:  Type of devices: All fall risk precautions in place, Patient at risk for falls, Call light within reach, Left in chair, Chair alarm in place, Gait belt    Plan:  Times per week: 6x/wk  Times per day: Daily  Current Treatment Recommendations: Strengthening, Functional Mobility Training, Transfer Training, Balance Training, Gait Training, Stair training, Home Exercise Program, Equipment Evaluation, Education, & procurement, Safety Education & Training, Patient/Caregiver Education & Training, Endurance Training    Goals:  Patient goals : to get stronger    Short term goals  Time Frame for Short term goals: 1 week  Short term goal 1: Pt to transfer supine <--> sit MINx1 to enable pt to get in/out of bed. Short term goal 2: Pt to transfer sit <--> stand CGA for increased functional mobility.   Short term goal 3: Pt to ambulate >15 feet for household ambulation with use of RW and CGA  Short term goal 4: Patient able to stand with F+ balance in order to decrease risk of future falls    Long term goals  Time Frame for Long term goals : 2 weeks  Long term goal 1: Patient will be able to perform bed mobility CGA to get in/out of bed  Long term goal 2: Patient will be able to perform functional transfers SBA to sit in bedside chair  Long term goal 3: Patient will be able to ambulate up to 50 feet with RW and CGA to walk around home safely  Long term goal 4: Patient able to stand with G balance in

## 2019-06-03 NOTE — PROGRESS NOTES
[address] [address]  SPEECH THERAPY  [facility] SKILLED NURSING UNIT  Speech - Language - Cognitive Evaluation    SLP Individual Minutes  Time In: 0900  Time Out: 0930  Minutes: 30  Timed Code Treatment Minutes: 0 Minutes       Date: 6/3/2019  Patient Name: Shirlean Aase      MRN: [id number]    : 1933  (80 y.o.)  Gender: female   Referring Physician:  Dr. Melody Field   Diagnosis: right pelvic fraction   Secondary Diagnosis:  Cognitive deficits   Diet:  Regular with thin   History of Present Illness/Injury: Patient admitted to [facility] with above dx. See physician H&P for full report. ST consulted d/t ongoing confusion noted. ST to complete cognitive evaluation and determine goals and POC and remain engaged within discharge planning as appropriate. Past Medical History:   Diagnosis Date    Anxiety     Blind right eye     Breast cancer metastasized to liver ([facility] 05/10/2016    Liver lesion noted     Carotid stenosis      Left ICA 25%    Colostomy in place [facility]) 2016    Degenerative arthritis of cervical spine     GERD (gastroesophageal reflux disease)     Hypercholesteremia     Hypoestrogenism     Hypothyroidism     Lumbago     Lumbosacral spinal stenosis 2019    MDRO (multiple drug resistant organisms) resistance     pt stated cleared    Personal history of cardiac dysrhythmia     Sigmoid diverticulosis     Spondylolisthesis of lumbosacral region     Steroid-induced hyperglycemia     Subclavian artery stenosis (HCC)     left    Superior and Inferior Pubic ramus fracture, right, closed, initial encounter ([facility] 2019    SVT (supraventricular tachycardia) ([facility]     Temporal arteritis (HCC)     Vertebral artery occlusion     left    Vitamin D deficiency 2017       Precautions: Fall Risk   Pain:  8-10/10 located in right side; YADY Beebe notified     Subjective:  Patient seen sitting upright in chair; alert and cooperative.  Patient reporting pain 8-10/10 located in right side. Patient reported, \"I can't remember if I had pain medication or not. \" ST assisted patient to utilize call light to notify RN immediately. SOCIAL HISTORY: Patient reports she lives alone in [place]. She reports she has nieces in [place] that assist her and a grandson that lives in [place]. She reported the following information re: ADL's and IADL's. Medication:  Nurse comes x1 a week to fill pill box and refill prescriptions. Patient reports, \"I take the medications throughout the day. Finances:  Patient reports, \"My niece helps. \"   Cooking, Cleaning, Laundry:  Patient reports, \"I do what I can but I have 2 aids that come 2 hours/day Monday-Friday. \"  Patient expressed concern stating, \"I don't know what I will do on the weekend when they are not there. \"         ORAL MOTOR:  Informal OME completed; all structures present to be intact and functioning appropriately     SPEECH / VOICE:  Speech:  Patient 100% intelligible within conversation, no dysarthria  Voice: Dry and clear, no dysphonia     LANGUAGE:  Receptive:  Patient able to understand all commands and answer questions without difficulty, no receptive deficits noted     Expressive:  Patient able to express basic wants and needs and engage in very basic conversation. no expressive deficits noted     COGNITION:  Bora Cognitive Assessment (MOCA) version 7.3 completed. Pt scored 9/30. Normal is greater than or equal to 26/30. Orientation: 5/6  Immediate recall:  2/5  Short term recall: 0/5  Divergent naming: 3 members/60 sec  Problem solving: Impaired; appropriate use of call light provided cues to initiate use   Reasonin/2 impaired   Sequencing: Impaired  Thought organization: Impaired  Insight: Poor  Attention: x5 errors/5 opportunities   Numerical relations: 0/3    SWALLOWING:  Patient denies swallowing deficits; completed PO trial of soft potato and thin liquids without difficulty, no s/s of aspiration.  Limited PO intake secondary to increased pain in side. Recommend continued regular diet with thin liquids. IMPRESSIONS: Patient presents with severe cognitive deficits as evidence by performance on cognitive evaluation MOCA 7.3. However, patient with report of pain 8-10/10 located in right side throughout evaluation. RN Tory James notified immediately. Patient with overall good participation and engagement throughout evaluation; although continuous report of \"I am in so much pain my brain can't even think right now. \" Patient strengths within the areas of social skills and interactions. Patient with impairments within the areas of memory, thought organization, executive functioning, problem solving, reasoning and attention; all skilled necessary to successfully return to the home setting to complete basic ADL's upon discharge. No dysarthria, dysphonia, dysphagia or expressive/receptive language deficits. Patient reports living alone with aid and nursing services. Patient would greatly benefit from skilled ST services in order to target cognitive deficits in order to successfully return to the home setting with high level of support and assistance upon discharge. At this time it is strongly recommended patient refrain from driving and will required high level of supervision and assistance. REHAB POTENTIAL: [] Excellent [x] Good [] Fair  [] Poor    EDUCATION:   Learner: [x]Patient [] Significant other [] Son/Daughter [] Parent     [] Other:   Education: [x] Reviewed results and recommendations of this evaluation     [] Reviewed diet and strategies     [] Reviewed signs, symptoms and risk of aspiration     [] Demonstrated how to thick liquid appropriately.      [x] Reviewed goals and Plan of Care     [] OTHER:   Method: [x] Discussion [x] Demonstration [] Hand-out     [] OTHER:   Evaluation of Education:     [x] Verbalizes understanding [] Demonstrates with assistance     [x] Demonstrates without assistance [x]Needs further instruction     [] No evidence of learning  [x] Family not present    PLAN / TREATMENT RECOMMENDATIONS:  [x] [x] Skilled SLP intervention on IP Rehab or TCU 30 minutes per day 5 days per week. Specific interventions for next session may include: cognitive tx      SHORT TERM GOALS:  Short-term Goals  Timeframe for Short-term Goals: 1 week  Goal 1: Patient will complete recall, short term memory and working memory tasks with 70% accuracy provided mod cues to improve overall recall of newly learned information and medication information (Ex. Pain medication PRN)   Goal 2: Patient will complete functional problem solving/reasoning tasks with 75% accuracy provided mod cues to improve overall safety within hospital and home environement  Goal 3: Patient will complete sequencing and divergent naming tasks with 70% accuracy provided mod cues to improve overall thought organization  Goal 4: Patient will complete executive functioning and basic comprehension tasks (Ex. Location of information, navigation) with 70% accuracy provided mod cues to improve overall return to completion of basic ADL's and IADL's.    Goal 5: Patient will complete sustained attention tasks with no more than 3 errors/redirections within 2 mintues in order to succesfuly return to completion of multi-step ADL tasks     LONG TERM GOALS:  Long-term Goals  Timeframe for Long-term Goals: 2 weeks   Goal 1: Patient will improve overall cognitive function to a supervision level in order to successfuly and safely return to the home setting with assistance upon discharge       TENNILLE Michele 23

## 2019-06-03 NOTE — PLAN OF CARE
Problem: Nutrition  Goal: Optimal nutrition therapy  Outcome: Ongoing      Nutrition Problem: Inadequate oral intake  Intervention: Food and/or Nutrient Delivery: Continue current diet, Discontinue ONS, Vitamin Supplement  Nutritional Goals: Patient will consume 75% or more of meals during LOS.

## 2019-06-03 NOTE — PROGRESS NOTES
Nutrition Assessment    Type and Reason for Visit: Reassess; Nutrition FU Note    Nutrition Recommendations:   Continue General Diet to help encourage po intake  Will stop Ensure Enlive as pt does not appear to be drinking them  Monitor pt's weight - increase seen with no edema noted  Agree with daily MVI and the need for at this time    Nutrition Assessment: Pt admitted with Right Pelvic Fracture (no surgery needed). Pt is stable from a nutritional standpoint as po intake has improved (>50% most meals); no significant wt loss; + stools. Pt is at risk for nutritional compromise d/t increased nutrient needs for fracture healing; advanced age; sleepiness exhibited today d/t medications. Continue Diet Order. Stop Ensure Enlive for now. Agree with MVI. Monitor pt's weight. Malnutrition Assessment:  · Malnutrition Status: At risk for malnutrition    Nutrition Risk Level: Moderate    Nutrient Needs:  · Estimated Daily Total Kcal: 4518-6028 kcals (22-25 kcal/kgm- 67kgm 5/24)  · Estimated Daily Protein (g): 65-75 grams (1.3-1.5 grams protein/kgm -ideal 50kgm)  · Estimated Daily Total Fluid (ml/day):  no fluid restrictions at this time    Nutrition Diagnosis:   · Problem: Inadequate oral intake  · Etiology: related to Insufficient energy/nutrient consumption     Signs and symptoms:  as evidenced by Patient report of(home use of oral nutrition supplement)    Objective Information:  · Nutrition-Focused Physical Findings: pt seen before lunch/right after respiratory tx; pt was VERY SLEEPY; could not even hold a conversation; otherwise pt has been eating better; will stop Ensure - a lot of them in room; continue yogurt; Glucose 91, no edema; Rx include Duoneb, Synthroid, MVI, Protonix, Prednisone, Zoloft;  Last BM was 6/2  · Wound Type: (scattered bruising)  · Current Nutrition Therapies:  · Oral Diet Orders: General   · Oral Diet intake: 51-75%  · Oral Nutrition Supplement (ONS) Orders: Standard High Calorie Oral Supplement(Ensure enlive once daily and Activia TID)  · ONS intake: stopped Ensure on 6/3  · Anthropometric Measures:  · Ht: 5' 2\" (157.5 cm)   · Current Body Wt: 155 lb 4.8 oz (70.4 kg) - no edema on 6/3  · Admission Body Wt: 148 lb 4.8 oz (67.3 kg)(5/24; no edema)  · Usual Body Wt: 151 lb (68.5 kg)(per patient; per EHR: 12/11/18 148# 3.2oz)  · Ideal Body Wt: 110 lb (49.9 kg),   · BMI Classification: BMI 25.0 - 29.9 Overweight    Nutrition Interventions:   Continue current diet, Discontinue ONS, Vitamin Supplement  Continued Inpatient Monitoring, Speech Therapy, Education not appropriate at this time, Coordination of Community Care    Nutrition Evaluation:   · Evaluation: Progressing toward goals   · Goals: Patient will consume 75% or more of meals during LOS.      · Monitoring: Meal Intake, Diet Tolerance, Skin Integrity, Mental Status/Confusion, Weight, Pertinent Labs, Monitor Bowel Function      Electronically signed by Rohan Millan RD, BAKARI on 6/3/19 at 4:32 PM    Contact Number: [phone]

## 2019-06-03 NOTE — PROGRESS NOTES
[address] 60  INPATIENT OCCUPATIONAL THERAPY  [facility] SKILLED NURSING UNIT  DAILY NOTE    Time:  Time In: 1316  Time Out: 1343  Timed Code Treatment Minutes: 27 Minutes  Minutes: 27          Date: 6/3/2019  Patient Name: Emilie Brown,   Gender: female      Room: [facility]/071-A  MRN: [id number]  : 1933  (80 y.o.)  Referring Practitioner: Dr. Mayra Lang, Dr. Brian Huerta Attending  Diagnosis: pubic ramus fracture RIght, closed  Additional Pertinent Hx: The patient is a 80 y.o. female  With multiple medical comorbidities who was admitted to ortho services for treatment of a right pelvic fracture. Patient has a history of breast CA with mets to the liver and is currently undergoing weekly chemotherapy. She completed a treatment yesterday and notes that she usually gets dizzy afterwards. She reports a fall in her home after an episode of dizziness yesterday. She fell onto her right side landing on the right hip. She was unable to bear weight afterwards. She denies hitting her head and no LOC. X-ray shows a right superior and inferior pubic rami fracture. Patient lives alone and is typically independent. She does ambulate with a walker. CT negative for R femur fracture.     Past Medical History:   Diagnosis Date    Anxiety     Blind right eye     Breast cancer metastasized to liver ([address] 05/10/2016    Liver lesion noted     Carotid stenosis      Left ICA 25%    Colostomy in place [facility]) 2016    Degenerative arthritis of cervical spine     GERD (gastroesophageal reflux disease)     Hypercholesteremia     Hypoestrogenism     Hypothyroidism     Lumbago     Lumbosacral spinal stenosis 2019    MDRO (multiple drug resistant organisms) resistance     pt stated cleared    Personal history of cardiac dysrhythmia     Sigmoid diverticulosis     Spondylolisthesis of lumbosacral region     Steroid-induced hyperglycemia     Subclavian artery stenosis ([address]     left    Superior and Inferior Pubic ramus fracture, right, closed, initial encounter ([facility] 05/21/2019    SVT (supraventricular tachycardia) ([facility] [facility] 6/07    Temporal arteritis ([facility]     Vertebral artery occlusion     left    Vitamin D deficiency 06/2017     Past Surgical History:   Procedure Laterality Date    CARDIAC CATHETERIZATION  5/07    CATARACT REMOVAL  right 1/08; left 2/08    CHOLECYSTECTOMY  1/03    COLONOSCOPY  11/06    COLOSTOMY  09/04/2018    REVERSAL OF COLOSTOMY    DILATION AND CURETTAGE OF UTERUS      ENDOSCOPY, COLON, DIAGNOSTIC      EYE SURGERY      EYE SURGERY Right 11/06/2017    Dr Wesley Castano in [address]      partial    OTHER SURGICAL HISTORY  05/27/2016    robotic assisted laparoscopic anterior colon resection and end colostomy    PA OFFICE/OUTPT VISIT,PROCEDURE ONLY N/A 9/4/2018    COLOSTOMY REVERSAL AND PARASTOMAL HERNIA REPAIR performed by Vadim Marin MD at [address] [facility]  2002    UPPER GASTROINTESTINAL ENDOSCOPY  11/06       Restrictions/Precautions:  Weight Bearing, Fall Risk     Right Lower Extremity Weight Bearing: Weight Bearing As Tolerated                      Prior Level of Function:  ADL Assistance: Needs assistance(HH aide assists pt 5 days a week x 2 hours.)  Homemaking Assistance: Needs assistance(aide assists with housekeeping and grocery shopping.)  Ambulation Assistance: Independent  Transfer Assistance: Independent  Additional Comments: Pt states amb with RW, O2 at night; has aide services 5 days/week that assists with laundry, showers and grocery shopping. Subjective       Subjective: Patient lying in bed upon arrival. Agreeable to OT session    Overall Orientation Status: Within Functional Limits         Pain:  Pain Assessment  Patient Currently in Pain: Yes  Pain Assessment: 0-10  Pain Level: 7  Pain Location: Back; Hip  Pain Orientation: Right       Objective  Overall Cognitive Status: Exceptions  Cognition Comment: decreased safety noted throughout session, confusion        ADL  Feeding: Setup(lunch tray)  Grooming: Contact guard assistance(-close SBA standing sinkside to wash face and wash hands (after toileting tasks))  LE Bathing: Contact guard assistance(washing dc area/bottom while standing)  Toileting: Contact guard assistance(for hygiene and clothing management. Extended time with use of bathroom this PM)        Bed mobility  Rolling to Left: Stand by assistance  Supine to Sit: Stand by assistance  Scooting: Stand by assistance    Transfers  Sit to stand: Contact guard assistance  Stand to sit: Stand by assistance  Toilet Transfers  Equipment Used: Raised toilet seat with rails  Toilet Transfer: Contact guard assistance(-SBA)    Balance  Sitting Balance: Supervision  Standing Balance: Contact guard assistance         Functional Mobility  Functional - Mobility Device: Rolling Walker  Activity: To/from bathroom  Assist Level: Contact guard assistance           Activity Tolerance:  Activity Tolerance: Patient limited by pain; Patient limited by fatigue    Assessment:     Performance deficits / Impairments: Decreased functional mobility , Decreased ADL status, Decreased endurance, Decreased strength, Decreased balance, Decreased safe awareness, Decreased high-level IADLs  Prognosis: Good    Discharge Recommendations:  Discharge Recommendations: Patient would benefit from continued therapy after discharge    Patient Education:  Patient Education: safety with transfers and mobility, ADL strateiges    Equipment Recommendations: Other: Pt would benefit from new shower chair as hers is approx 8 yrs old, not sturdy and screws are loosening. May benefit from [facility] and [facility].     Safety:  Safety Devices in place: Yes  Type of devices: Call light within reach, Chair alarm in place, Left in chair, Gait belt       Plan:  Times per week: 6x  Current Treatment Recommendations: Strengthening, Balance Training, Endurance Training, Functional

## 2019-06-03 NOTE — PATIENT CARE CONFERENCE
[facility]  Transitional Care Unit  Team Conference Note    Patient Name: Augusto Dobbins   MRN: [id number]    : 1933  (80 y.o.)  Gender: female   Referring Practitioner: Pai Orta, Ordering: Boy Goncalves MD  Diagnosis: superior and inferior pubic ramus fracture, right, initial encounter     Team Conference Date: 2019   Admission Date:    14:62 AM  Re-Certification Date: 5339    :  Tentative Discharge Plan and current psychosocial issues: The patient continues to complain of pain from her pelvic fracture and has been evaluated for cognitive deficits by the . She has [employer] services during the week, and worker is checking to see if patient can potentially have aides on the weekends as well. Due Medicaid as a secondary payor, patient is limited to a 20 day stay on TCU ().     Nursing:     Weight:  Weight: has been stable     Wounds/Incisions/Ulcers:  No skin/wound issues identified  Bowel: continent  Bladder:continent     Pain: Patient's pain is currently controlled with tylenol, norco    Education Provided:  Diabetic:  Yes   Peg Tube:No    Koenig Care:  Education:NA  Removal Necessary:  NA  Supplies Needed: NA     Anticoagulant Use:  Patient on lovenox for anticoagulation, which is being managed by Primary Care Physician    Antibiotic Use:  Patient not on antibiotics    Psychotropic Medication Use:  Patient on xanax and zoloft     Oxygen Use: No    Home Medications Reconciled: N/A    Physical Therapy:   Bed Mobility  Scooting: Stand by assistance  Transfers  Sit to Stand: Contact guard assistance, Minimal Assistance(fluctuated from cga-> min a )  Stand to sit: Stand by assistance, Contact guard assistance(occasional vc for proper body positioning upon reaching back to sit)  Bed to Chair: Minimal assistance(stand pivot transfer without ad from w/c-> b/s chair)  Car Transfer: Contact guard assistance(cga stand<-> sit , otherwise sba (self assist with b le's in/out of car.))  Comment: After all mobility patient required prolonged rest break secondary to pain. Patient able to complete pursed lip breathing with cueing   Ambulation 1  Surface: level tile, carpet, uneven  Device: Rolling Walker  Other Apparatus: Wheelchair follow  Assistance: Contact guard assistance  Quality of Gait: Slow, Decreased B step lengths and ht. and decreased stance on R LE. forward flexed posture, vc to amb closer to walker, freq. instability right knee Min instability but no LOB  Gait Deviations: Slow Gillian, Decreased step length  Distance: 15' x 1, 30' x 1, 15' x 1, 25' x 1, 30' x 1, 35' x 1  Comments: increased unsteadiness and instability right knee this am,. Stairs  Curbs: 6\"(porch step)  Device: Rolling walker  Assistance: Minimal assistance, Contact guard assistance(min -> lt. mod a up and cga down)  Comment: Cuing for technique. PT Equipment Recommendations  Equipment Needed: No    Occupational  Therapy:   ADL  Feeding: Setup(lunch tray)  Grooming: Contact guard assistance(-close SBA standing sinkside to wash face and wash hands (after toileting tasks))  UE Bathing: Supervision(seated in bedside chair)  LE Bathing: Contact guard assistance(washing dc area/bottom while standing)  UE Dressing: Setup(to doff gown and don undershirt/tshirt seated in chair)  LE Dressing: Moderate assistance(threading BLE into pants/undergarment. Able to don slip on shoes seated EOB. Leilani to pull up over hips while standing)  Toileting: Contact guard assistance(for hygiene and clothing management. Extended time with use of bathroom this PM)  Additional Comments: Would benefit from education on use of LHAE       Speech Therapy:   IMPRESSIONS: Patient presents with severe cognitive deficits as evidence by performance on cognitive evaluation MOCA 7.3. However, patient with report of pain 8-10/10 located in right side throughout evaluation. YADY Sheridan notified immediately.  Patient with overall good participation and engagement throughout evaluation; although continuous report of \"I am in so much pain my brain can't even think right now. \" Patient strengths within the areas of social skills and interactions. Patient with impairments within the areas of memory, thought organization, executive functioning, problem solving, reasoning and attention; all skilled necessary to successfully return to the home setting to complete basic ADL's upon discharge. No dysarthria, dysphonia, dysphagia or expressive/receptive language deficits. Patient reports living alone with aid and nursing services. Patient would greatly benefit from skilled ST services in order to target cognitive deficits in order to successfully return to the home setting with high level of support and assistance upon discharge. At this time it is strongly recommended patient refrain from driving and will required high level of supervision and assistance. Nutrition:    Weight: 155 lb 4.8 oz (70.4 kg)(Bed zeroed) / Body mass index is 28.4 kg/m². Please see nutrition note for details.     Family Education: No family available for education  Fall Risk:  Falling star program initiated    Goal Achievement:   Patient Continues to progress toward goal achievement    Discharge Plan   Estimated Length of Stay: when placement arranged  Destination: skilled nursing facility  Services at Discharge: SNF Physical Therapy, Occupational Therapy and Speech Therapy 5x week  Equipment at Discharge: No equipment needs  Factors facilitating achievement of predicted outcomes: Family support, Motivated, Cooperative and Pleasant  Barriers to the achievement of predicted outcomes: Pain, Cognitive deficit, Limited safety awareness, Decreased endurance, Upper extremity weakness and Lower extremity weakness    Readmission Risk              Risk of Unplanned Readmission:        30         High (20-31)        Team Members Present at Conference:  Physician: Dr. Kaylynn Nguyen MD  Nurse: Papi Flores Henny Lauren LPN  :  WADE QuinterosW  Occupational Therapist:  LEFTY Keys  Physical Therapist:   Gomez Mustafa, PT  Speech Therapist: Speech Therapist: Jazmin Ramirez, SLP    All team members have reviewed and updated as necessary and appropriate the established interdisciplinary plan of care within the medical record of [address] [place] [address]. Pt's team conference attended (see above). Pt seen on rounds with LSW, to review the results with patient and family. Discussed length of stay as above.     Electronically signed by Amira Soriano MD on 6/4/2019 at 9:38 AM

## 2019-06-03 NOTE — PROGRESS NOTES
Sandra Copeland 60  INPATIENT OCCUPATIONAL THERAPY  [facility] [address] SKILLED NURSING UNIT  DAILY NOTE    Time:  Time In: 2970  Time Out: 1447  Timed Code Treatment Minutes: 27 Minutes  Minutes: 27          Date: 6/3/2019  Patient Name: Cesar Sanders,   Gender: female      Room: -71/071-A  MRN: [id number]  : 1933  (80 y.o.)  Referring Practitioner: Dr. Kirsty Dempsey, Dr. Mc Huerta Attending  Diagnosis: pubic ramus fracture RIght, closed  Additional Pertinent Hx: The patient is a 80 y.o. female  With multiple medical comorbidities who was admitted to ortho services for treatment of a right pelvic fracture. Patient has a history of breast CA with mets to the liver and is currently undergoing weekly chemotherapy. She completed a treatment yesterday and notes that she usually gets dizzy afterwards. She reports a fall in her home after an episode of dizziness yesterday. She fell onto her right side landing on the right hip. She was unable to bear weight afterwards. She denies hitting her head and no LOC. X-ray shows a right superior and inferior pubic rami fracture. Patient lives alone and is typically independent. She does ambulate with a walker. CT negative for R femur fracture.     Past Medical History:   Diagnosis Date    Anxiety     Blind right eye     Breast cancer metastasized to liver ([address] 05/10/2016    Liver lesion noted     Carotid stenosis      Left ICA 25%    Colostomy in place [facility]) 2016    Degenerative arthritis of cervical spine     GERD (gastroesophageal reflux disease)     Hypercholesteremia     Hypoestrogenism     Hypothyroidism     Lumbago     Lumbosacral spinal stenosis 2019    MDRO (multiple drug resistant organisms) resistance     pt stated cleared    Personal history of cardiac dysrhythmia     Sigmoid diverticulosis     Spondylolisthesis of lumbosacral region     Steroid-induced hyperglycemia     Subclavian artery stenosis ([address]     left    Superior and Inferior Pubic ramus fracture, right, closed, initial encounter ([facility] 05/21/2019    SVT (supraventricular tachycardia) ([facility] 6/07    Temporal arteritis ([facility]     Vertebral artery occlusion     left    Vitamin D deficiency 06/2017     Past Surgical History:   Procedure Laterality Date    CARDIAC CATHETERIZATION  5/07    CATARACT REMOVAL  right 1/08; left 2/08    CHOLECYSTECTOMY  1/03    COLONOSCOPY  11/06    COLOSTOMY  09/04/2018    REVERSAL OF COLOSTOMY    DILATION AND CURETTAGE OF UTERUS      ENDOSCOPY, COLON, DIAGNOSTIC      EYE SURGERY      EYE SURGERY Right 11/06/2017    Dr Teri Buitrago in [address]      partial    OTHER SURGICAL HISTORY  05/27/2016    robotic assisted laparoscopic anterior colon resection and end colostomy    MS OFFICE/OUTPT VISIT,PROCEDURE ONLY N/A 9/4/2018    COLOSTOMY REVERSAL AND PARASTOMAL HERNIA REPAIR performed by Yaz Colon MD at [address] [facility]  2002    UPPER GASTROINTESTINAL ENDOSCOPY  11/06       Restrictions/Precautions:  Weight Bearing, Fall Risk     Right Lower Extremity Weight Bearing: Weight Bearing As Tolerated                      Prior Level of Function:  ADL Assistance: Needs assistance(HH aide assists pt 5 days a week x 2 hours.)  Homemaking Assistance: Needs assistance(aide assists with housekeeping and grocery shopping.)  Ambulation Assistance: Independent  Transfer Assistance: Independent  Additional Comments: Pt states amb with RW, O2 at night; has aide services 5 days/week that assists with laundry, showers and grocery shopping. Subjective       Subjective: Patient finishing lunch upon arrival. Agreeable to complete OT session    Overall Orientation Status: Within Functional Limits         Pain:  Pain Assessment  Patient Currently in Pain: Yes  Pain Assessment: 0-10  Pain Level: 8  Pain Location: Back; Hip  Pain Orientation: Right       Objective  Overall Cognitive Status: Exceptions  Cognition Comment: decreased safety noted throughout session, confusion      Bed mobility  Rolling to Left: Stand by assistance  Supine to Sit: Stand by assistance  Scooting: Stand by assistance    Transfers  Sit to stand: Contact guard assistance(-SBA)  Stand to sit: Stand by assistance  Transfer Comments: Minimal cueing provided throughout for proper hand placement during transfers- poor carry over noted    Balance  Sitting Balance: Supervision  Standing Balance: Contact guard assistance(-SBA)         Functional Mobility  Functional - Mobility Device: Rolling Walker  Activity: To/From therapy gym  Assist Level: Contact guard assistance  Functional Mobility Comments: Slow pace, no LOB noted. Min vcs to keep walker closer to self during mobility. Seated rest break after each trial of mobility       Type of ROM/Therapeutic Exercise: Free weights  Comment: Pt completed BUE strengthening exercises x15 reps x1 set this date with a 1# dumb bell in all joints and all planes in order to increase strength and improve activity tolerance required for functional toilet & shower transfers and ADL routine. Pt tolerated fair, requiring  rest breaks throughout task. Activity Tolerance:  Activity Tolerance: Patient limited by pain; Patient limited by fatigue    Assessment:     Performance deficits / Impairments: Decreased functional mobility , Decreased ADL status, Decreased endurance, Decreased strength, Decreased balance, Decreased safe awareness, Decreased high-level IADLs  Prognosis: Good    Discharge Recommendations:  Discharge Recommendations: Patient would benefit from continued therapy after discharge    Patient Education:  Patient Education: safety with transfers and mobility, BUE exercises    Equipment Recommendations: Other: Pt would benefit from new shower chair as hers is approx 8 yrs old, not sturdy and screws are loosening. May benefit from [facility] and [facility].     Safety:  Safety Devices in place: Yes  Type of devices: Call light within reach, Chair alarm in place, Left in chair, Gait belt       Plan:  Times per week: 6x  Current Treatment Recommendations: Strengthening, Balance Training, Endurance Training, Functional Mobility Training, Patient/Caregiver Education & Training, Self-Care / ADL, Safety Education & Training, Home Management Training, Equipment Evaluation, Education, & procurement    Goals:  Patient goals : decrease pain to be able to go home soon. Short term goals  Time Frame for Short term goals: 1 week  Short term goal 1: Pt will complete ADL routine with no cues for adapted tech and LHAE PRN to increase independende in self care tasks   Short term goal 2: Pt will complete functional ambulation to and from the BR with CGA and no cues for RW safety to increase independence in toileting routine  Short term goal 3: Pt will complete 1 step light homemaking tasks with no > min cues for attention to task to increase independence in clothng retrieval.   Short term goal 4: Pt will complete standing ADL with CGA with 1 UE release for > 3 minutes and no > 1 vc to attend to task to increase ease with sink side grooming  Short term goal 5: Pt will demo indep wtih BUE light strenthenng HEP  for improved UB strength and activity tolerance needed for ease with funcitonal transfers and ADLs  Long term goals  Time Frame for Long term goals : 3-4 weeks  Long term goal 1: PT will complete ADL routine with no cues for safe and adapted tech with S for completion to increase independence in self care tasks  Long term goal 2: PT will complete 2 step homemaking tasks with S and no cues for problem solving or attention to task to be able to prepare a simple meal   If patient is discharged prior to progress note completion, this note is to serve as the discharge note with all goals being unmet unless indicated otherwise.

## 2019-06-04 LAB
BILIRUBIN URINE: NEGATIVE
BLOOD, URINE: NEGATIVE
CHARACTER, URINE: CLEAR
COLOR: YELLOW
GLUCOSE BLD-MCNC: 97 MG/DL (ref 70–108)
GLUCOSE URINE: NEGATIVE MG/DL
KETONES, URINE: NEGATIVE
LEUKOCYTE ESTERASE, URINE: NEGATIVE
NITRITE, URINE: NEGATIVE
PH UA: 7 (ref 5–9)
PROTEIN UA: NEGATIVE
SPECIFIC GRAVITY, URINE: 1.01 (ref 1–1.03)
UROBILINOGEN, URINE: 1 EU/DL (ref 0–1)

## 2019-06-04 PROCEDURE — 97530 THERAPEUTIC ACTIVITIES: CPT

## 2019-06-04 PROCEDURE — 1290000000 HC SEMI PRIVATE OTHER R&B

## 2019-06-04 PROCEDURE — 82948 REAGENT STRIP/BLOOD GLUCOSE: CPT

## 2019-06-04 PROCEDURE — 94640 AIRWAY INHALATION TREATMENT: CPT

## 2019-06-04 PROCEDURE — 97110 THERAPEUTIC EXERCISES: CPT

## 2019-06-04 PROCEDURE — 6370000000 HC RX 637 (ALT 250 FOR IP): Performed by: EMERGENCY MEDICINE

## 2019-06-04 PROCEDURE — 97116 GAIT TRAINING THERAPY: CPT

## 2019-06-04 PROCEDURE — 6370000000 HC RX 637 (ALT 250 FOR IP): Performed by: ORTHOPAEDIC SURGERY

## 2019-06-04 PROCEDURE — 97535 SELF CARE MNGMENT TRAINING: CPT

## 2019-06-04 PROCEDURE — 81003 URINALYSIS AUTO W/O SCOPE: CPT

## 2019-06-04 PROCEDURE — 97127 HC SP THER IVNTJ W/FOCUS COG FUNCJ: CPT

## 2019-06-04 PROCEDURE — 6360000002 HC RX W HCPCS: Performed by: ORTHOPAEDIC SURGERY

## 2019-06-04 RX ADMIN — ACETAMINOPHEN 650 MG: 325 TABLET ORAL at 14:04

## 2019-06-04 RX ADMIN — ISOSORBIDE MONONITRATE 30 MG: 30 TABLET ORAL at 09:01

## 2019-06-04 RX ADMIN — PREDNISONE 2 MG: 1 TABLET ORAL at 09:01

## 2019-06-04 RX ADMIN — LEVOTHYROXINE SODIUM 88 MCG: 88 TABLET ORAL at 05:57

## 2019-06-04 RX ADMIN — SERTRALINE 50 MG: 50 TABLET, FILM COATED ORAL at 09:01

## 2019-06-04 RX ADMIN — SERTRALINE 50 MG: 50 TABLET, FILM COATED ORAL at 20:58

## 2019-06-04 RX ADMIN — ENOXAPARIN SODIUM 40 MG: 40 INJECTION SUBCUTANEOUS at 09:01

## 2019-06-04 RX ADMIN — CETIRIZINE HYDROCHLORIDE 5 MG: 10 TABLET, FILM COATED ORAL at 09:01

## 2019-06-04 RX ADMIN — HYDROCODONE BITARTRATE AND ACETAMINOPHEN 1 TABLET: 5; 325 TABLET ORAL at 10:14

## 2019-06-04 RX ADMIN — SIMVASTATIN 20 MG: 20 TABLET, FILM COATED ORAL at 20:58

## 2019-06-04 RX ADMIN — PANTOPRAZOLE SODIUM 40 MG: 40 TABLET, DELAYED RELEASE ORAL at 05:57

## 2019-06-04 RX ADMIN — PANTOPRAZOLE SODIUM 40 MG: 40 TABLET, DELAYED RELEASE ORAL at 17:18

## 2019-06-04 RX ADMIN — METOPROLOL TARTRATE 12.5 MG: 25 TABLET ORAL at 09:01

## 2019-06-04 RX ADMIN — HYDROCODONE BITARTRATE AND ACETAMINOPHEN 2 TABLET: 5; 325 TABLET ORAL at 05:57

## 2019-06-04 RX ADMIN — IPRATROPIUM BROMIDE AND ALBUTEROL SULFATE 1 AMPULE: .5; 3 SOLUTION RESPIRATORY (INHALATION) at 06:02

## 2019-06-04 RX ADMIN — IPRATROPIUM BROMIDE AND ALBUTEROL SULFATE 1 AMPULE: .5; 3 SOLUTION RESPIRATORY (INHALATION) at 17:41

## 2019-06-04 RX ADMIN — METOPROLOL TARTRATE 12.5 MG: 25 TABLET ORAL at 20:58

## 2019-06-04 RX ADMIN — MULTIPLE VITAMINS W/ MINERALS TAB 1 TABLET: TAB at 09:01

## 2019-06-04 RX ADMIN — HYDROCODONE BITARTRATE AND ACETAMINOPHEN 2 TABLET: 5; 325 TABLET ORAL at 18:22

## 2019-06-04 ASSESSMENT — PAIN DESCRIPTION - LOCATION: LOCATION: BACK;HIP

## 2019-06-04 ASSESSMENT — PAIN DESCRIPTION - PROGRESSION
CLINICAL_PROGRESSION: NOT CHANGED

## 2019-06-04 ASSESSMENT — PAIN - FUNCTIONAL ASSESSMENT
PAIN_FUNCTIONAL_ASSESSMENT: PREVENTS OR INTERFERES SOME ACTIVE ACTIVITIES AND ADLS
PAIN_FUNCTIONAL_ASSESSMENT: ACTIVITIES ARE NOT PREVENTED

## 2019-06-04 ASSESSMENT — PAIN SCALES - GENERAL
PAINLEVEL_OUTOF10: 5
PAINLEVEL_OUTOF10: 10
PAINLEVEL_OUTOF10: 3
PAINLEVEL_OUTOF10: 9
PAINLEVEL_OUTOF10: 9
PAINLEVEL_OUTOF10: 0

## 2019-06-04 ASSESSMENT — PAIN SCALES - WONG BAKER: WONGBAKER_NUMERICALRESPONSE: 4

## 2019-06-04 ASSESSMENT — PAIN DESCRIPTION - DESCRIPTORS: DESCRIPTORS: ACHING;DISCOMFORT;NAGGING

## 2019-06-04 ASSESSMENT — PAIN DESCRIPTION - PAIN TYPE
TYPE: ACUTE PAIN
TYPE: ACUTE PAIN

## 2019-06-04 ASSESSMENT — PAIN DESCRIPTION - FREQUENCY: FREQUENCY: INTERMITTENT

## 2019-06-04 ASSESSMENT — PAIN DESCRIPTION - ORIENTATION: ORIENTATION: RIGHT

## 2019-06-04 ASSESSMENT — PAIN DESCRIPTION - ONSET: ONSET: ON-GOING

## 2019-06-04 NOTE — PROGRESS NOTES
RECREATIONAL THERAPY  MISSED TREATMENT    [x]Transitional Care Unit  []Inpatient Rehabilitation    Date:  6/4/2019            Patient Name: Jose Luis Rios           MRN: [id number]  Acct: [de-identified]          YOB: 1933 (80 y.o.)       Gender: female   Diagnosis: pubic ramus fracture RIght, closed  Physician: Referring Practitioner: Dr. Gunner Mcmullen, Dr. Kim Agudelo. Bradley Attending    REASON FOR MISSED TREATMENT:    []Hold treatment per nursing request  []Patient refusal  []Family declined treatment  []Patient at testing and/or off the floor  []Patient unavailable, with PT/OT/nursing, etc.  [x]Other, Upon arrival, Pt. Was with nursing.- RT student asked if Pt. Would like to eat lunch in the dining room with peers.- Pt. Denied the request and stated that she would like to stay in her room.    Allison Chacon    6/4/2019

## 2019-06-04 NOTE — PROGRESS NOTES
[facility]  Recreational Therapy  Daily Note  - [place] SKILLED NURSING UNIT    Time Spent with Patient: 15 minutes    Date:  6/4/2019       Patient Name: Cesar Sanders      MRN: [id number]      YOB: 1933 (80 y.o.)       Gender: female  Diagnosis: pubic ramus fracture RIght, closed  Referring Practitioner: Dr. Kirsty Dempsey, Dr. Mc Huerta Attending    RESTRICTIONS/PRECAUTIONS:  Restrictions/Precautions: Weight Bearing, Fall Risk  Vision: Impaired  Hearing: Within functional limits    PAIN: 6-R hip -nurse notified     SUBJECTIVE:  I want to lay down     OBJECTIVE:  Upon arrival pt standing at sink to wash her hands with MI-ambulated to her bed with RW and SBA-sit to supine with Min A for B LE's into bed-comfort measures given-nurse aware pt requesting pain medication if it is time-appreciative          Patient Education  New Education Provided: Importance of Leisure, Walker Safety    Electronically signed by: WALTER Calderón  Date: 6/4/2019

## 2019-06-04 NOTE — PROGRESS NOTES
[facility]  INPATIENT SPEECH THERAPY  [facility] SKILLED NURSING UNIT    TIME   SLP Individual Minutes  Time In: 3426  Time Out: 1400  Minutes: 25  Timed Code Treatment Minutes: 25 Minutes       [x]Daily Note  []Progress Note  []Discharge Note    Date: 2019  Patient Name: Sherlyn Cartwright        MRN: [id number]    : 1933  (80 y.o.)  Gender: female   Primary Provider: Roseline Crow MD  Admitting Diagnosis:  Right pelvic fracture   Secondary Diagnosis: Cognitive deficits   Precautions: Fall Risk   Swallowing Status/Diet: Standard   Swallowing Strategies: Regular with thin   DATE of last MBS:  N/a   Pain:  9/10 located in right leg     Subjective: Patient seen at bedside; alert and cooperative. Patient continues with high level of pain with continuous request for pain medication. ST assisted patient to refer to care board to locate \"next available dose time. \"      SHORT TERM GOAL #1:  Goal 1: Patient will complete recall, short term memory and working memory tasks with 70% accuracy provided mod cues to improve overall recall of newly learned information and medication information (Ex. Pain medication PRN)   INTERVENTIONS: Orientation:   indep   Short term memory: Patient presented with 3 basic words; cued to utilize association strategy to recall. Immediate recall:  3/3 indep. 10 minute delay: 3/3 indep. 15 minute delay: 3/3 indep     SHORT TERM GOAL #2:  Goal 2: Patient will complete functional problem solving/reasoning tasks with 75% accuracy provided mod cues to improve overall safety within hospital and home environement  INTERVENTIONS: Time management regarding PRN pain medication:  1/3 mod cues, 2/3 MAX cues   *High concerns for accidental medication overdose     Call light:  Patient required mod-max cues to locate call light (despite within patient's reach).   Once located patient with appropriate use to request pain medication     SHORT TERM GOAL #3:  Goal 3: Patient will complete and swallowing precautions    []Home Exercise Program  [x]Progress and/or discharge information  Method of Education:  [x]Discussion          [x]Demonstration          []Handout          []Other  Evaluation of Education:   [x]Verbalized understanding   []Demonstrates without assistance  [x]Demonstrates with assistance  [x]Needs further instruction     []No evidence of learning                  [x]No family present      Plan: [x]Continue with current plan of care    []Modify current plan of care as follows:    []Discharge patient    Discharge Location:    Services/Supervision Recommended:     [x]Patient continues to require treatment by a licensed therapist to address functional deficits as outlined in the established plan of care.     Litzy Ramirez M.S. [address]

## 2019-06-04 NOTE — PROGRESS NOTES
[address] [address] THERAPY  - [place] SKILLED NURSING UNIT  PROGRESS NOTE    Time:  Time In: 0810  Time Out: 0570  Timed Code Treatment Minutes: 39 Minutes  Minutes: 45          Date: 2019  Patient Name: Camille Nava,   Gender: female      MRN: [id number]  : 1933  (80 y.o.)  Referring Practitioner: Dr. Shaylee Russo, Dr. Roxy Huerta Attending  Diagnosis: pubic ramus fracture RIght, closed  Additional Pertinent Hx: The patient is a 80 y.o. female  With multiple medical comorbidities who was admitted to ortho services for treatment of a right pelvic fracture. Patient has a history of breast CA with mets to the liver and is currently undergoing weekly chemotherapy. She completed a treatment yesterday and notes that she usually gets dizzy afterwards. She reports a fall in her home after an episode of dizziness yesterday. She fell onto her right side landing on the right hip. She was unable to bear weight afterwards. She denies hitting her head and no LOC. X-ray shows a right superior and inferior pubic rami fracture. Patient lives alone and is typically independent. She does ambulate with a walker. CT negative for R femur fracture.     Restrictions/Precautions:  Restrictions/Precautions: Weight Bearing, Fall Risk    Right Lower Extremity Weight Bearing: Weight Bearing As Tolerated                 Past Medical History:   Diagnosis Date    Anxiety     Blind right eye     Breast cancer metastasized to liver ([facility] 05/10/2016    Liver lesion noted     Carotid stenosis      Left ICA 25%    Colostomy in place [facility]) 2016    Degenerative arthritis of cervical spine     GERD (gastroesophageal reflux disease)     Hypercholesteremia     Hypoestrogenism     Hypothyroidism     Lumbago     Lumbosacral spinal stenosis 2019    MDRO (multiple drug resistant organisms) resistance     pt stated cleared    Personal history of cardiac dysrhythmia     Sigmoid diverticulosis 8/04    Spondylolisthesis of lumbosacral region 8/04    Steroid-induced hyperglycemia     Subclavian artery stenosis (HCC)     left    Superior and Inferior Pubic ramus fracture, right, closed, initial encounter ([facility] 05/21/2019    SVT (supraventricular tachycardia) ([facility] 6/07    Temporal arteritis ([facility]     Vertebral artery occlusion     left    Vitamin D deficiency 06/2017     Past Surgical History:   Procedure Laterality Date    CARDIAC CATHETERIZATION  5/07    CATARACT REMOVAL  right 1/08; left 2/08    CHOLECYSTECTOMY  1/03    COLONOSCOPY  11/06    COLOSTOMY  09/04/2018    REVERSAL OF COLOSTOMY    DILATION AND CURETTAGE OF UTERUS      ENDOSCOPY, COLON, DIAGNOSTIC      EYE SURGERY      EYE SURGERY Right 11/06/2017    Dr Lopez in [address]      partial    OTHER SURGICAL HISTORY  05/27/2016    robotic assisted laparoscopic anterior colon resection and end colostomy    MD OFFICE/OUTPT VISIT,PROCEDURE ONLY N/A 9/4/2018    COLOSTOMY REVERSAL AND PARASTOMAL HERNIA REPAIR performed by Jon Nj MD at [address] [place] [address] / [address] [place] [address]  2002    UPPER GASTROINTESTINAL ENDOSCOPY  11/06           Subjective       Subjective: Pt sitting in chair, reluctant initially to engage in session, however, pleasant and cooperative with encouragement    Overall Orientation Status: Within Functional Limits         Pain:  Pain Assessment  Patient Currently in Pain: Yes  Pain Assessment: Faces  Collins-Baker Pain Rating: Hurts little more  Pain Type: Acute pain  Pain Location: Back;Hip       Objective  Overall Cognitive Status: Exceptions  Cognition Comment: decreased safety noted throughout session, confusion, slow processing                   ADL  LE Dressing: Minimal assistance(socks and slippers with use of sock aid.  Pt excited about sock aid and may want to purchase)     Instrumental ADLs: Yes  Light Housekeeping  Light Housekeeping Level: [employer] Housekeeping Level of to meeting pt's goals. Pt continues to require skilled OT intervention to improve ADL/IADL performance required for pt to return [facility]. Prognosis: Good  Discharge Recommendations: ECF with OT    Patient Education:  Patient Education: safety with RW in kitchen environment    Equipment Recommendations: Other: Pt would benefit from new shower chair as hers is approx 8 yrs old, not sturdy and screws are loosening. Discussed LHAE--pt very interested in equipment--follow-up about purchasing. May require BSC. Safety:  Safety Devices in place: Yes  Type of devices: Call light within reach, Chair alarm in place, Left in chair, Gait belt, Patient at risk for falls, All fall risk precautions in place    Plan:  Times per week: 6x  Current Treatment Recommendations: Strengthening, Balance Training, Endurance Training, Functional Mobility Training, Patient/Caregiver Education & Training, Self-Care / ADL, Safety Education & Training, Home Management Training, Equipment Evaluation, Education, & procurement    Goals:  Patient goals : decrease pain to be able to go home soon. Short term goals  Time Frame for Short term goals: 1 week  Short term goal 1: Pt will complete ADL routine with no cues for adapted tech and LHAE PRN to increase independende in self care tasks  NOT MET, CONTINUE    Short term goal 2: Pt will complete functional ambulation to and from the BR with CGA and no cues for RW safety to increase independence in toileting routine MET, REVISE    Short term goal 3: Pt will complete 1 step light homemaking tasks with no > min cues for attention to task to increase independence in clothng retrieval. MET, REVISE    Short term goal 4: Pt will complete standing ADL with CGA with 1 UE release for > 3 minutes and no > 1 vc to attend to task to increase ease with sink side grooming.  MET, REVISE    Short term goal 5: Pt will demo indep wtih BUE light strenthenng HEP  for improved UB strength and activity tolerance needed for ease with funcitonal transfers and ADLs.  CONTINUE  Long term goals  Time Frame for Long term goals : 3-4 weeks  Long term goal 1: PT will complete ADL routine with no cues for safe and adapted tech with S for completion to increase independence in self care tasks NOT MET, CONTINUE    Long term goal 2: PT will complete 2 step homemaking tasks with S and no cues for problem solving or attention to task to be able to prepare a simple meal NOT MET, CONTINUE    Revised Short-Term Goals  Short term goals  Time Frame for Short term goals: 1 week  Short term goal 1: Pt will complete ADL routine with no cues for adapted tech and LHAE PRN to increase independende in self care tasks   Short term goal 2: Pt will complete functional ambulation to and from the BR with SBA and no cues for RW safety to increase independence in toileting routine  Short term goal 3: Pt will complete multi step light homemaking tasks with SBA and no > min cues for attention to task to increase independence in clothng retrieval.   Short term goal 4: Pt will complete dynamic standing task with B UE release for 8 minutes with SBA and no > 1 vc to attend to task to increase ease with sink side grooming  Short term goal 5: Pt will demo indep wtih BUE light strenthenng HEP  for improved UB strength and activity tolerance needed for ease with funcitonal transfers and ADLs  Long term goals  Time Frame for Long term goals : 3-4 weeks  Long term goal 1: PT will complete ADL routine with no cues for safe and adapted tech with S for completion to increase independence in self care tasks  Long term goal 2: PT will complete 2 step homemaking tasks with S and no cues for problem solving or attention to task to be able to prepare a simple meal

## 2019-06-04 NOTE — PROGRESS NOTES
[facility]  INPATIENT PHYSICAL THERAPY  Progress  NOTE  [facility] SKILLED NURSING UNIT - [id number]-71/[id number]    Time In: 3291  Time Out: 1134  Timed Code Treatment Minutes: 52 Minutes  Minutes: 49          Date: 2019  Patient Name: Milo Blanchard,  Gender:  female        MRN: [id number]  : 1933  (80 y.o.)     Referring Practitioner: Sobeida Joyner, Ordering: Christiano Encarnacion MD  Diagnosis: superior and inferior pubic ramus fracture, right, initial encounter   Additional Pertinent Hx: The patient is a 80 y.o. female  With multiple medical comorbidities who was admitted to ortho services for treatment of a right pelvic fracture. Patient has a history of breast CA with mets to the liver and is currently undergoing weekly chemotherapy. She completed a treatment yesterday and notes that she usually gets dizzy afterwards. She reports a fall in her home after an episode of dizziness yesterday. She fell onto her right side landing on the right hip. She was unable to bear weight afterwards. She denies hitting her head and no LOC. X-ray shows a right superior and inferior pubic rami fracture. Patient lives alone and is typically independent. She does ambulate with a walker. CT negative for R femur fracture. Prior Level of Function:  Lives With: Alone  Type of Home: House  Home Layout: One level  Home Access: Level entry  Home Equipment: Rolling walker, Oxygen    Restrictions/Precautions:  Restrictions/Precautions: Weight Bearing, Fall Risk  Right Lower Extremity Weight Bearing: Weight Bearing As Tolerated    SUBJECTIVE: Pt resting in bed. Pleasant and cooperative. PAIN: Not asked to rate. Current with pain meds. Rt hip/pelvic pain noted. OBJECTIVE:  Bed Mobility:  Supine to Sit: Stand By Assistance bed flat, with rail assist  Sit to Supine: Stand By Assistance bed flat with rail assist    Transfers:  Sit to Stand: Contact Guard Assistance from bed and w/c. Cues for hand placement.   1 trial with 8CE with cues for locking/unlocking brakes  Stand to Sit:Minimal Assistance to CGA with cues for hand placement ea time. Minimally decreased control with descent  Car:Minimal Assistance sit <->stand, cues for hand placement    Ambulation:  Contact Guard Assistance  Distance: 15 ft, x3  Surface: Level Tile  Device:rolling walker x2 trials, 4WW x1 trial  Gait Deviations: Forward Flexed Posture, Slow Gillian and decreased stance tolerance RLE. Heavy reliance on UEs benjamin with the RW. With 4WW, decreased control of walker maneuvering, swaying back and forth. More hip pain noted with use of 4WW  Stairs:  Minimal Assistance  Steps/Height: 1 steps and 6\" step  Device:Parallel Bars  Cues and demo for sequencing. EXERCISES:  The following exercises were completed to increase range of motion and strength for increased independence with functional mobility:    EXERCISE REPS/SETS   Glut Sets 10/1   Quads Sets 10/1 BLEs   Ankle Pumps 10/1 BLEs with cues to emphasize df   Heel Slides 10/1 BLEs with CGA   Short Arc Quads 10/1 BLEs   Long Arc Quads    Hip ABDuction 10/1 LLE, 5/1 RLE with CGA   Straight Leg Raise    BUE diagonals to ea direction 10/1   isa scapular retraction 10/1   Abdominal isometrics 10/1             ASSESSMENT:  Activity Tolerance:  Patient tolerance of  treatment: fair. Pt has met 3 short term goals, so showing progress, but Limited with gait/standing due to Rt hip pain. Carryover with safe technique also requiring 75-90% cues ea session. Pt will benefit from continued skilled PT to improve overall functional mobility and safety . See revised goals.   Equipment Recommendations:Equipment Needed: No  Discharge Recommendations:  Discharge Recommendations: 24 hour supervision or assist, Patient would benefit from continued therapy after discharge(alot of safety issues if to return home alone)    Plan: Times per week: 6x/wk  Times per day: Daily  Current Treatment Recommendations: Strengthening, Functional Goals  Long term goals  Time Frame for Long term goals : 2 weeks  Long term goal 1: Patient will be able to perform bed mobility CGA to get in/out of bed  Long term goal 2: Patient will be able to perform functional transfers SBA to sit in bedside chair  Long term goal 3: Patient will be able to ambulate up to 50 feet with RW and CGA to walk around home safely  Long term goal 4: Pt to get in/out of car, SBA, for transportation needs.    Long term goal 5: Pt to ascend/descend 6\" step with RW, SBA, for community mobility

## 2019-06-05 ENCOUNTER — APPOINTMENT (OUTPATIENT)
Dept: CT IMAGING | Age: 84
End: 2019-06-05
Payer: MEDICARE

## 2019-06-05 ENCOUNTER — APPOINTMENT (OUTPATIENT)
Dept: GENERAL RADIOLOGY | Age: 84
DRG: 561 | End: 2019-06-05
Attending: EMERGENCY MEDICINE
Payer: MEDICARE

## 2019-06-05 LAB — GLUCOSE BLD-MCNC: 97 MG/DL (ref 70–108)

## 2019-06-05 PROCEDURE — 6370000000 HC RX 637 (ALT 250 FOR IP): Performed by: ORTHOPAEDIC SURGERY

## 2019-06-05 PROCEDURE — 97116 GAIT TRAINING THERAPY: CPT

## 2019-06-05 PROCEDURE — 6370000000 HC RX 637 (ALT 250 FOR IP): Performed by: EMERGENCY MEDICINE

## 2019-06-05 PROCEDURE — 70450 CT HEAD/BRAIN W/O DYE: CPT

## 2019-06-05 PROCEDURE — 94640 AIRWAY INHALATION TREATMENT: CPT

## 2019-06-05 PROCEDURE — 73701 CT LOWER EXTREMITY W/DYE: CPT

## 2019-06-05 PROCEDURE — 97127 HC SP THER IVNTJ W/FOCUS COG FUNCJ: CPT

## 2019-06-05 PROCEDURE — 72170 X-RAY EXAM OF PELVIS: CPT

## 2019-06-05 PROCEDURE — 97110 THERAPEUTIC EXERCISES: CPT

## 2019-06-05 PROCEDURE — 6360000002 HC RX W HCPCS: Performed by: ORTHOPAEDIC SURGERY

## 2019-06-05 PROCEDURE — 82948 REAGENT STRIP/BLOOD GLUCOSE: CPT

## 2019-06-05 PROCEDURE — 1290000000 HC SEMI PRIVATE OTHER R&B

## 2019-06-05 PROCEDURE — 6360000004 HC RX CONTRAST MEDICATION: Performed by: EMERGENCY MEDICINE

## 2019-06-05 PROCEDURE — 97535 SELF CARE MNGMENT TRAINING: CPT

## 2019-06-05 RX ADMIN — ALPRAZOLAM 0.5 MG: 0.5 TABLET ORAL at 17:32

## 2019-06-05 RX ADMIN — PREDNISONE 2 MG: 1 TABLET ORAL at 09:53

## 2019-06-05 RX ADMIN — ISOSORBIDE MONONITRATE 30 MG: 30 TABLET ORAL at 09:53

## 2019-06-05 RX ADMIN — METOPROLOL TARTRATE 12.5 MG: 25 TABLET ORAL at 09:53

## 2019-06-05 RX ADMIN — HYDROCODONE BITARTRATE AND ACETAMINOPHEN 2 TABLET: 5; 325 TABLET ORAL at 09:59

## 2019-06-05 RX ADMIN — METOPROLOL TARTRATE 12.5 MG: 25 TABLET ORAL at 20:31

## 2019-06-05 RX ADMIN — SERTRALINE 50 MG: 50 TABLET, FILM COATED ORAL at 20:31

## 2019-06-05 RX ADMIN — IPRATROPIUM BROMIDE AND ALBUTEROL SULFATE 1 AMPULE: .5; 3 SOLUTION RESPIRATORY (INHALATION) at 11:57

## 2019-06-05 RX ADMIN — IPRATROPIUM BROMIDE AND ALBUTEROL SULFATE 1 AMPULE: .5; 3 SOLUTION RESPIRATORY (INHALATION) at 05:34

## 2019-06-05 RX ADMIN — HYDROCODONE BITARTRATE AND ACETAMINOPHEN 2 TABLET: 5; 325 TABLET ORAL at 06:05

## 2019-06-05 RX ADMIN — SERTRALINE 50 MG: 50 TABLET, FILM COATED ORAL at 09:53

## 2019-06-05 RX ADMIN — IPRATROPIUM BROMIDE AND ALBUTEROL SULFATE 1 AMPULE: .5; 3 SOLUTION RESPIRATORY (INHALATION) at 16:22

## 2019-06-05 RX ADMIN — LEVOTHYROXINE SODIUM 88 MCG: 88 TABLET ORAL at 05:56

## 2019-06-05 RX ADMIN — PANTOPRAZOLE SODIUM 40 MG: 40 TABLET, DELAYED RELEASE ORAL at 17:32

## 2019-06-05 RX ADMIN — ENOXAPARIN SODIUM 40 MG: 40 INJECTION SUBCUTANEOUS at 09:54

## 2019-06-05 RX ADMIN — PANTOPRAZOLE SODIUM 40 MG: 40 TABLET, DELAYED RELEASE ORAL at 05:56

## 2019-06-05 RX ADMIN — SIMVASTATIN 20 MG: 20 TABLET, FILM COATED ORAL at 20:31

## 2019-06-05 RX ADMIN — MULTIPLE VITAMINS W/ MINERALS TAB 1 TABLET: TAB at 09:53

## 2019-06-05 RX ADMIN — HYDROCODONE BITARTRATE AND ACETAMINOPHEN 2 TABLET: 5; 325 TABLET ORAL at 17:32

## 2019-06-05 RX ADMIN — IOPAMIDOL 80 ML: 755 INJECTION, SOLUTION INTRAVENOUS at 18:07

## 2019-06-05 RX ADMIN — CETIRIZINE HYDROCHLORIDE 5 MG: 10 TABLET, FILM COATED ORAL at 09:53

## 2019-06-05 ASSESSMENT — PAIN SCALES - GENERAL
PAINLEVEL_OUTOF10: 8
PAINLEVEL_OUTOF10: 0
PAINLEVEL_OUTOF10: 5
PAINLEVEL_OUTOF10: 5
PAINLEVEL_OUTOF10: 10
PAINLEVEL_OUTOF10: 8
PAINLEVEL_OUTOF10: 0
PAINLEVEL_OUTOF10: 8
PAINLEVEL_OUTOF10: 0
PAINLEVEL_OUTOF10: 9
PAINLEVEL_OUTOF10: 10

## 2019-06-05 ASSESSMENT — PAIN DESCRIPTION - ORIENTATION
ORIENTATION: RIGHT

## 2019-06-05 ASSESSMENT — PAIN DESCRIPTION - LOCATION
LOCATION: HIP
LOCATION: BACK;HIP
LOCATION: HIP;BACK
LOCATION: BACK;HIP

## 2019-06-05 ASSESSMENT — PAIN DESCRIPTION - PAIN TYPE
TYPE: ACUTE PAIN
TYPE: ACUTE PAIN

## 2019-06-05 ASSESSMENT — PAIN DESCRIPTION - PROGRESSION: CLINICAL_PROGRESSION: RAPIDLY WORSENING

## 2019-06-05 ASSESSMENT — PAIN DESCRIPTION - DESCRIPTORS
DESCRIPTORS: ACHING
DESCRIPTORS: ACHING;THROBBING

## 2019-06-05 ASSESSMENT — PAIN - FUNCTIONAL ASSESSMENT: PAIN_FUNCTIONAL_ASSESSMENT: PREVENTS OR INTERFERES SOME ACTIVE ACTIVITIES AND ADLS

## 2019-06-05 ASSESSMENT — PAIN DESCRIPTION - FREQUENCY: FREQUENCY: CONTINUOUS

## 2019-06-05 ASSESSMENT — PAIN DESCRIPTION - ONSET: ONSET: ON-GOING

## 2019-06-05 NOTE — PROGRESS NOTES
Patient Name: Jose Luis Rios        MRN: [id number]    : 1933  (80 y.o.)  Gender: female   Principal Problem: Pubic ramus fracture, right, closed, initial encounter [facility])    Section F - Preferences for Customary Routine Activities   . Should Interview for Daily and Activity Preferences be Conducted? - Attempt to interview all residents able to communicate. If resident is unable to complete, attempt to complete interview with family member or significant other. Enter Code    1 0. No (resident is rarely/never understood and family/significant other not available) à Skip to and complete , Staff Assessment of Daily and Activity Preferences  1. Yes à Continue to Atul Pereira for Daily Preferences   . Interview for Daily Preferences  Show resident the response options and say: While you are in this facility           Codin - Very Important  2 - Somewhat Important  3 - Not very important  4 - Not important at all  5 - Important, but cant do or no choice  6 - No response or non-responsive Enter Codes in Boxes    1 A. How important is it to you to choose what clothes to wear?    1 B. How important is it to you to take care of your personal belongings or things?    4 C. How important is it to you to choose between a tub bath, shower, bed bath or sponge bath?    4 D. How important is it to you to have snacks available between meals?    1 E. How important is it to you to choose your own bedtime?    1 F. How important is it to you to have your family or a close friend involved in discussions about your care?    1 G. How important is it to you to be able to use the phone in private?    4 H. How important is it to you to have a place to lock your things to keep them safe? .  Interview for Activity Preferences   Show resident the response options and say: Wanda Ache you are in this facility           Codin - Very Important  2 - Somewhat Important  3 - Not very important  4 - Not important at all  5 - Important, but cant do or no choice  6 - No response or non-responsive Enter Codes in Boxes    1 A. How important is it to you to have books, newspapers, and magazines to read?    1 B. How important is it to you to listen to music you like?    1 C. How important is it to you to be around animals such as pets?     2 D. How important is it to you to keep up with the news?    2 E. How important is it to you to do things with groups of people?     1 F. How important is it to you to do your favorite activities?     1 G. How important is it to you to go outside to get fresh air when the weather is good?     1 H. How important is it to you to participate in [employer] services or practices? .  Daily and Activity Preferences Primary Respondent   Enter Code    1 Indicate Primary respondent for Daily and Activity Preferences ( and )  1 - Resident  2 - Family or Significant other (close friend or other representative)  5 - Interview could not be completed by resident or family/significant other (no response to 3 or more items)

## 2019-06-05 NOTE — PROGRESS NOTES
Family Medicine Progress Notes/Coverage    Today's Date: 6/5/19  [id number]  Medical Record # [id number]  Account # [de-identified]      Ms. Rod admitted on 5/24/2019        Subjective / Interval History :     Lump on the right lateral proximal thigh noted yesterday  C/o right pelvic pain  + intermittent headache  Reviewed notes, consults, laboratory and radiology results,    Objective:       Physical Exam:  Patient Vitals for the past 24 hrs:   BP Temp Temp src Pulse Resp SpO2   06/05/19 0830 (!) 138/58 98.3 °F (36.8 °C) Oral 69 12 95 %   06/05/19 0812 (!) 149/48 98.3 °F (36.8 °C) Oral 62 16 92 %   06/04/19 2044 (!) 177/74 98.1 °F (36.7 °C) Oral 69 20 91 %       General Appearance:  Alert, cooperative, no distress, appears stated age   HEENT:  Neck:      Chest/Lungs:  Heart: CTA  RRR   Abdomen:  Back: soft   Extremities:  Neurological Exam: 3 cm lump on the right proximal lateral thigh non tender  WNL       Assessment:     Admitting Diagnosis:    right pelvic fracture    Active Hospital Problems    Diagnosis Date Noted    Superior and Inferior Pubic ramus fracture, right, closed, initial encounter ([facility] [address] Magen Shepard 05/21/2019     Priority: High    Anxiety [F41.9] 06/29/2016     Priority: High    Breast cancer metastasized to liver ([facility] [address] [C50.919, C78.7] 06/08/2016     Priority: High    On antineoplastic chemotherapy [Z79.899] 05/25/2019     Priority: Medium    Lumbosacral spinal stenosis [M48.07] 05/01/2019     Priority: Medium    Personal history of cardiac dysrhythmia [Z86.79]      Priority: Medium    Current chronic use of systemic steroids [Z79.52] 09/04/2018     Priority: Medium    H/O: CVA (cerebrovascular accident) [Z86.73] 11/06/2017     Priority: Medium    Coronary artery disease involving native coronary artery of native heart without angina pectoris [I25.10] Denny Reilly M.D.

## 2019-06-05 NOTE — PROGRESS NOTES
Transitional Care Unit                   RECERTIFICATION    Patient Name: Maria A Wolf        MRN: [id number]    Susan: [de-identified]    : 1933  (80 y.o.)  Gender: female     RECERTIFICATION of continued SNF inpatient care. On or before the 14 day:  Date:      I certify that continued SNF inpatient care is necessary for the following reason(s):  PT/OT     I estimate that the additional period of SNF inpatient care will be 5-7 days.       Plans for post-SNF care are:  []Home Health Agency  [x]Office Care    [x]Other : ECF    Continued SNF care is same condition for which patient received inpatient hospital services:  [x]Yes   []No  Electronically signed by Ghazal Leach MD on 2019 at 9:02 AM

## 2019-06-05 NOTE — PLAN OF CARE
Problem: Discharge Planning:  Intervention: Involve patient/S. O. in Discharge Planning process  Note:   The TCU team meeting was held 6/4. This worker shared update with team members that patient's PASSPORT aide services cannot be extended at this time to include 7 day a week coverage. Patient's family members (nieces) are very supportive and help as able, but have other responsibilities, and one of the nieces lives in [place], [place]. The patient is a major safety risk and continues to show severe cognitive deficits. She is limited to no more than 20 TCU days due to Medicaid being a secondary payer. Consensus of the team is to discharge patient to a community nursing home for continued care and rehab therapies at this time. She will need to discharge no later than 6/12. Following the meeting, the  met with patient and explained the current obstacles in being able to send her home. She mentioned that she has a grandson who is staying in [place] that she could ask to come and stay with her, but this seems highly unlikely. Informed her that I will speak with her nieces about making appropriate plans, and will also update her PASSPORT worker, Charo Redd. Today, call was place to patient's niece Alka Brasher, and an update was provided. She acknowledged that she and her sister are unable to provide any direct assistance to patient at this time. She will speak with her sister Teresa Hernandez, regarding nursing home choices and return a call to this worker as soon as possible. The PASSPORT worker visited with patient today and also reinforced the need for her to be in a safe 24 hr setting when she leaves TCU. She reported that patient understood this, and is receptive to admitting to a nursing home. Will wait to hear from extended family and will reach out to several ecf's to ascertain bed availability. Patient's physician has also been updated as to the discharge plan and is in agreement.  MIGEL Clifford
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
## 2019-06-05 NOTE — PLAN OF CARE
Problem: Falls - Risk of:  Goal: Will remain free from falls  Description  Will remain free from falls  6/5/2019 0320 by Minnie Yanez RN  Outcome: Ongoing  Note:   No falls noted this shift. Patient uses call light appropriately. 6/5/2019 0320 by Minnie Yanez RN  Outcome: Ongoing  Note:   No falls noted this shift. Patient uses call light appropriately     Problem: Falls - Risk of:  Goal: Absence of physical injury  Description  Absence of physical injury  6/5/2019 0320 by Minnie Yanez RN  Outcome: Ongoing  6/5/2019 0320 by Minnie Yanez RN  Outcome: Ongoing     Problem: Risk for Impaired Skin Integrity  Goal: Tissue integrity - skin and mucous membranes  Description  Structural intactness and normal physiological function of skin and  mucous membranes. 6/5/2019 0320 by Minnie Yanez RN  Outcome: Ongoing  Note:   No skin breakdown noted. Patient repositions self frequently. 6/5/2019 0320 by Minnie Yanez RN  Outcome: Ongoing  Note:   No skin issues noted. Problem: Pain:  Goal: Pain level will decrease  Description  Pain level will decrease  6/5/2019 0320 by Minnie Yanez RN  Outcome: Ongoing  Note:   Patient rated pain 0/10. PRN pain medication available  6/5/2019 0320 by Minnie Yanez RN  Outcome: Ongoing  Note:   Patient rating pain 0/10. PRN med available. Problem: Pain:  Goal: Pain level will decrease  Description  Pain level will decrease  6/5/2019 0320 by Minnie Yanez RN  Outcome: Ongoing  Note:   Patient rated pain 0/10. PRN pain medication available  6/5/2019 0320 by Minnie Yanez RN  Outcome: Ongoing  Note:   Patient rating pain 0/10. PRN med available.      Problem: Pain:  Goal: Control of acute pain  Description  Control of acute pain  6/5/2019 0320 by Minnie Yanez RN  Outcome: Ongoing  6/5/2019 0320 by Minnie Yanez RN  Outcome: Ongoing     Problem: Pain:  Goal: Control of chronic will improve  6/5/2019 0320 by Alex Da Silva RN  Outcome: Ongoing  6/5/2019 0320 by Alex Da Silva RN  Outcome: Ongoing     Problem: Physical Regulation:  Goal: Complications related to the disease process, condition or treatment will be avoided or minimized  Description  Complications related to the disease process, condition or treatment will be avoided or minimized  6/5/2019 0320 by Alex Da Silva RN  Outcome: Ongoing  6/5/2019 0320 by Alex Da Silva RN  Outcome: Ongoing     Problem: Mood - Altered:  Goal: Mood stable  Description  Mood stable  6/5/2019 0320 by Alex Da Silva RN  Outcome: Ongoing  Note:   Patient calm and cooperative with care. 6/5/2019 0320 by Alex Da Silva RN  Outcome: Ongoing     Problem: Nutrition  Goal: Optimal nutrition therapy  6/5/2019 0320 by Alex Da Silva RN  Outcome: Ongoing  6/5/2019 0320 by Alex Da Silva RN  Outcome: Ongoing     Problem: IP MOBILITY  Goal: LTG - patient will demonstrate safe mobility requirements  6/5/2019 0320 by Alex Da Silva RN  Outcome: Ongoing  6/5/2019 0320 by Alex Da Silva RN  Outcome: Ongoing     Problem: IP BALANCE  Goal: LTG - Patient will maintain balance to allow for safe/functional mobility  6/5/2019 0320 by Alex Da Silva RN  Outcome: Ongoing  6/5/2019 0320 by Alex Da Silva RN  Outcome: Ongoing     Problem: IP BALANCE  Goal: BALANCE EDUCATION  Description  Educate patients on maintaining dynamic/static standing/sitting balance, with/without upper extremity support.   6/5/2019 0320 by Alex Da Silva RN  Outcome: Ongoing  6/5/2019 0320 by Alex Da Silva RN  Outcome: Ongoing     Problem: Discharge Planning:  Goal: Patients continuum of care needs are met  Description  Patients continuum of care needs are met  6/5/2019 0320 by Alex Da Silva RN  Outcome: Ongoing  6/5/2019 0320 by Alex Da Silva RN  Outcome: Ongoing     Problem: IP COMMUNICATION/DYSARTHRIA  Goal: LTG - patient will improve expressive language skills to allow for communication of wants and needs in daily activities  6/5/2019 0320 by Keith Eisenberg RN  Outcome: Ongoing  6/5/2019 0320 by Keith Eisenberg, RN  Outcome: Ongoing

## 2019-06-05 NOTE — PROGRESS NOTES
0830 Alert and oriented to person, place, and year. ROSA reactive in left 3 mm to 2 mm brisk. Unable to assess right side due to prosthetic. Sclera white. Mucous membranes pink and moist. Hair clean and dry. Skin warm with scattered bruising. Speech clear. No difficulty with swallowing. Explitory wheezing throughout all fields. Respirations easy and regular. Non productive cough. Heart sounds strong and regular. Bowel sounds active in all four quadrants. Abdomen round, soft, with no pain to palpation. States not passing gas. Last bowel movement was 6-4-19. Denies difficulty with urination. Radial pulse strong bilaterally. Hand grasp strong bilaterally. Arm drift negative bilaterally. Capillary refill less than three seconds. Skin turgor brisk. No edema noted. Pedal pulse strong bilaterally. Pedal push and pull weak bilaterally. Missael sign negative. States tenderness with dorsiflex to right foot. Leg lift negative. States numbness with right leg. Bed in low position. Call light and overhead table within reach. Side rails up times 2. Denies need for anything else at this time.       Pura PNS

## 2019-06-05 NOTE — PROGRESS NOTES
[address] [place] [address]  INPATIENT PHYSICAL THERAPY  DAILY NOTE  [facility] SKILLED NURSING UNIT - 8E-71/071-A    Time In: 9692  Time Out: 1158  Timed Code Treatment Minutes: 32 Minutes  Minutes: 27          Date: 2019  Patient Name: Zofia Rodriguez,  Gender:  female        MRN: [id number]  : 1933  (80 y.o.)     Referring Practitioner: Jenny Laurent, Ordering: Ladonna Arroyo MD  Diagnosis: superior and inferior pubic ramus fracture, right, initial encounter   Additional Pertinent Hx: The patient is a 80 y.o. female  With multiple medical comorbidities who was admitted to ortho services for treatment of a right pelvic fracture. Patient has a history of breast CA with mets to the liver and is currently undergoing weekly chemotherapy. She completed a treatment yesterday and notes that she usually gets dizzy afterwards. She reports a fall in her home after an episode of dizziness yesterday. She fell onto her right side landing on the right hip. She was unable to bear weight afterwards. She denies hitting her head and no LOC. X-ray shows a right superior and inferior pubic rami fracture. Patient lives alone and is typically independent. She does ambulate with a walker. CT negative for R femur fracture. Prior Level of Function:  Lives With: Alone  Type of Home: House  Home Layout: One level  Home Access: Level entry  Home Equipment: Rolling walker, Oxygen    Restrictions/Precautions:  Restrictions/Precautions: Weight Bearing, Fall Risk  Right Lower Extremity Weight Bearing: Weight Bearing As Tolerated    SUBJECTIVE: Pt resting in bed. Pt to have Rt hip x-rayed due to c/o pain and a \"knot\" at lateral hip    PAIN: not rated, but RT hip pain noted. Current with pain meds OBJECTIVE:  Bed Mobility:  Supine to Sit: Minimal Assistance at trunk to elevate.   Rail assist.    Transfers:  Sit to Stand: Stand By Assistance  Stand to Sit:Contact Eliezer to fully align up to next seated surface with RW and then hand placement    Ambulation:  Contact Guard Assistance  Distance: 85 ft  Surface: Level Tile  Device:Rolling Walker  Gait Deviations:  Antalgic gait pattern favoring RLE. Min shortness of breath, forward flexed trunk, slow pace     EXERCISES:  The following exercises were completed to increase range of motion and strength for increased independence with functional mobility:    EXERCISE REPS/SETS   Glut Sets 10/1 with cues for technique   Quads Sets 10/1 BLEs with cues for technique   Ankle Pumps 10/1 BLEs with cues to emphasize df   Heel Slides 10/1 BLEs with min assist RLE   Short Arc Quads 10/1 BLEs   Long Arc Quads 5/1 BLEs   Hip ABDuction 10/1 LLE,  RLE deferred due to c/o pain   Straight Leg Raise    Hamstring Curls    Hamstring Curls with Theraband                  ASSESSMENT:  Activity Tolerance:  Patient tolerance of  treatment: fair. Limited due to Rt hip pain     Equipment Recommendations:Equipment Needed: No(Pt has RW, [id number])  Discharge Recommendations:  Discharge Recommendations: 24 hour supervision or assist, Patient would benefit from continued therapy after discharge(alot of safety issues if to return home alone)    Plan: Times per week: 6x/wk  Times per day: Daily  Current Treatment Recommendations: Strengthening, Functional Mobility Training, Transfer Training, Balance Training, Gait Training, Stair training, Home Exercise Program, Equipment Evaluation, Education, & procurement, Safety Education & Training, Patient/Caregiver Education & Training, Endurance Training    Patient Education  Patient Education: Home Exercise Program  Issued and reviewed supine ex. Transfer training. Required further education    Goals:  Patient goals : to get stronger  Short term goals  Time Frame for Short term goals: 1 week  Short term goal 1: Pt to transfer supine <--> sit MINx1 to enable pt to get in/out of bed. MET see LTG  Short term goal 2: Pt to transfer sit <--> stand CGA for increased functional mobility.   Short term goal 3: Pt to ambulate >15 feet for household ambulation with use of RW and CGA  Short term goal 4: Pt to get in/out of car, CGA, for transportation needs. Short term goal 5: Pt to ascend/descend 6\" step with rw, CGa, for community mobility  Long term goals  Time Frame for Long term goals : 2 weeks  Long term goal 1: Patient will be able to perform bed mobility CGA to get in/out of bed  Long term goal 2: Patient will be able to perform functional transfers SBA to sit in bedside chair  Long term goal 3: Patient will be able to ambulate up to 50 feet with RW and CGA to walk around home safely  Long term goal 4: Pt to get in/out of car, SBA, for transportation needs.    Long term goal 5: Pt to ascend/descend 6\" step with RW, SBA, for community mobility

## 2019-06-05 NOTE — PROGRESS NOTES
[address] [place] [address]  Recreational Therapy  Daily Note  [facility]    Time Spent with Patient: 10 minutes    Date:  6/5/2019       Patient Name: Luis Milligan      MRN: [id number]      YOB: 1933 (80 y.o.)       Gender: female  Diagnosis: pubic ramus fracture RIght, closed  Referring Practitioner: Dr. Jennifer Nevarez, Dr. Seferino Clement. Bradley Attending    Patient enjoyed spending time with our pet therapy dogs.  Pt enjoyed petting our pet therapy dogs Randa and Selena this am-affect bright and social-she thought Harris Diamond was her dog from home initially     Electronically signed by: Michelle Jain, CTRS  Date: 6/5/2019

## 2019-06-05 NOTE — PROGRESS NOTES
[address] [address] THERAPY  - [facility]ILLED NURSING UNIT  DAILY NOTE    Time:  Time In: 900  Time Out:   Timed Code Treatment Minutes: 54 Minutes  Minutes: 55          Date: 2019  Patient Name: Adrian Orr,   Gender: female      Room: [facility]71/071-A  MRN: [id number]  : 1933  (80 y.o.)  Referring Practitioner: Dr. Jaime Land, Dr. Julio Huerta Attending  Diagnosis: pubic ramus fracture RIght, closed  Additional Pertinent Hx: The patient is a 80 y.o. female  With multiple medical comorbidities who was admitted to ortho services for treatment of a right pelvic fracture. Patient has a history of breast CA with mets to the liver and is currently undergoing weekly chemotherapy. She completed a treatment yesterday and notes that she usually gets dizzy afterwards. She reports a fall in her home after an episode of dizziness yesterday. She fell onto her right side landing on the right hip. She was unable to bear weight afterwards. She denies hitting her head and no LOC. X-ray shows a right superior and inferior pubic rami fracture. Patient lives alone and is typically independent. She does ambulate with a walker. CT negative for R femur fracture.     Past Medical History:   Diagnosis Date    Anxiety     Blind right eye     Breast cancer metastasized to liver ([address] 05/10/2016    Liver lesion noted     Carotid stenosis      Left ICA 25%    Colostomy in place [facility]) 2016    Degenerative arthritis of cervical spine     GERD (gastroesophageal reflux disease)     Hypercholesteremia     Hypoestrogenism     Hypothyroidism     Lumbago     Lumbosacral spinal stenosis 2019    MDRO (multiple drug resistant organisms) resistance     pt stated cleared    Personal history of cardiac dysrhythmia     Sigmoid diverticulosis     Spondylolisthesis of lumbosacral region     Steroid-induced hyperglycemia     Subclavian artery stenosis ([address]     left    Superior and Inferior Pubic ramus fracture, right, closed, initial encounter ([facility] 05/21/2019    SVT (supraventricular tachycardia) ([facility] 6/07    Temporal arteritis ([facility]     Vertebral artery occlusion     left    Vitamin D deficiency 06/2017     Past Surgical History:   Procedure Laterality Date    CARDIAC CATHETERIZATION  5/07    CATARACT REMOVAL  right 1/08; left 2/08    CHOLECYSTECTOMY  1/03    COLONOSCOPY  11/06    COLOSTOMY  09/04/2018    REVERSAL OF COLOSTOMY    DILATION AND CURETTAGE OF UTERUS      ENDOSCOPY, COLON, DIAGNOSTIC      EYE SURGERY      EYE SURGERY Right 11/06/2017    Dr Marc Davila in [address]      partial    OTHER SURGICAL HISTORY  05/27/2016    robotic assisted laparoscopic anterior colon resection and end colostomy    [place] OFFICE/OUTPT VISIT,PROCEDURE ONLY N/A 9/4/2018    COLOSTOMY REVERSAL AND PARASTOMAL HERNIA REPAIR performed by Jon Nj MD at [address] [facility]  2002    UPPER GASTROINTESTINAL ENDOSCOPY  11/06       Restrictions/Precautions:  Weight Bearing, Fall Risk     Right Lower Extremity Weight Bearing: Weight Bearing As Tolerated                      Prior Level of Function:  ADL Assistance: Needs assistance(HH aide assists pt 5 days a week x 2 hours.)  Homemaking Assistance: Needs assistance(aide assists with housekeeping and grocery shopping.)  Ambulation Assistance: Independent  Transfer Assistance: Independent  Additional Comments: Pt states amb with RW, O2 at night; has aide services 5 days/week that assists with laundry, showers and grocery shopping. Subjective       Subjective: Patient seated in bedside chair upon arrival. Agreeable to OT session    Overall Orientation Status: Within Functional Limits         Pain:  Pain Assessment  Patient Currently in Pain: Yes(Nurse Concepción notified)  Pain Assessment: 0-10  Pain Level: 10  Pain Location: Back; Hip  Pain Orientation: Right       Objective  Overall Cognitive Status: Exceptions  Cognition Comment: decreased safety noted throughout session, confusion, slow processing    ADL  Grooming: Stand by assistance(Close SBA standing sinkside to wash hands after toileting tasks)  Toileting: Stand by assistance(Close SBA for hygiene and clothing management. Extended time required with use of bathroom this AM)     Instrumental ADLs: Yes  Meal Prep  Meal Prep Level: Walker(RW)  Meal Prep Level of Assistance: Contact guard assistance  Meal Preparation: Patient completed IADL homemaking task this date of making scrambled eggs - task was graded to challenge activity tolerance, 1-2 UE release from walker. Patient was able to retrieve all items from graded heights with CGA and min VCs for safety during the retrieval and transportation of items. Patient required cueing throughout for proper placement of walker. Patient required 1 cue for thoroughness of cooking eggs fully. Patient required 1 seated rest break throughout task and 1 seated rest break after task with a standing endurance of 7 minutes. Bed mobility  Sit to Supine: Stand by assistance    Transfers  Sit to stand: Contact guard assistance  Stand to sit: Contact guard assistance(-SBA)  Toilet Transfers  Equipment Used: Raised toilet seat with rails  Toilet Transfer: Contact guard assistance(-SBA)    Balance  Sitting Balance: Supervision  Standing Balance: Contact guard assistance           Functional Mobility  Functional - Mobility Device: Rolling Walker  Activity: To/from bathroom; To/From therapy gym  Assist Level: Contact guard assistance  Functional Mobility Comments: Slow pace, no LOB noted. Unsteadiness noted at times d/t pain.  Lengthy seated rest break after each trial of mobility       Type of ROM/Therapeutic Exercise: Free weights  Comment: Pt completed BUE strengthening exercises x15 reps x1 set this date with a 1# dumb bell in all joints and all planes in order to increase strength and improve activity tolerance required

## 2019-06-06 LAB — GLUCOSE BLD-MCNC: 90 MG/DL (ref 70–108)

## 2019-06-06 PROCEDURE — 6370000000 HC RX 637 (ALT 250 FOR IP): Performed by: EMERGENCY MEDICINE

## 2019-06-06 PROCEDURE — 97110 THERAPEUTIC EXERCISES: CPT

## 2019-06-06 PROCEDURE — 82948 REAGENT STRIP/BLOOD GLUCOSE: CPT

## 2019-06-06 PROCEDURE — 97530 THERAPEUTIC ACTIVITIES: CPT

## 2019-06-06 PROCEDURE — 97127 HC SP THER IVNTJ W/FOCUS COG FUNCJ: CPT

## 2019-06-06 PROCEDURE — 6360000002 HC RX W HCPCS: Performed by: ORTHOPAEDIC SURGERY

## 2019-06-06 PROCEDURE — 97535 SELF CARE MNGMENT TRAINING: CPT

## 2019-06-06 PROCEDURE — 6370000000 HC RX 637 (ALT 250 FOR IP): Performed by: ORTHOPAEDIC SURGERY

## 2019-06-06 PROCEDURE — 97116 GAIT TRAINING THERAPY: CPT

## 2019-06-06 PROCEDURE — 94640 AIRWAY INHALATION TREATMENT: CPT

## 2019-06-06 PROCEDURE — 1290000000 HC SEMI PRIVATE OTHER R&B

## 2019-06-06 RX ADMIN — HYDROCODONE BITARTRATE AND ACETAMINOPHEN 2 TABLET: 5; 325 TABLET ORAL at 10:17

## 2019-06-06 RX ADMIN — PANTOPRAZOLE SODIUM 40 MG: 40 TABLET, DELAYED RELEASE ORAL at 17:31

## 2019-06-06 RX ADMIN — CETIRIZINE HYDROCHLORIDE 5 MG: 10 TABLET, FILM COATED ORAL at 08:50

## 2019-06-06 RX ADMIN — LEVOTHYROXINE SODIUM 88 MCG: 88 TABLET ORAL at 06:06

## 2019-06-06 RX ADMIN — MULTIPLE VITAMINS W/ MINERALS TAB 1 TABLET: TAB at 08:49

## 2019-06-06 RX ADMIN — PREDNISONE 2 MG: 1 TABLET ORAL at 08:49

## 2019-06-06 RX ADMIN — ISOSORBIDE MONONITRATE 30 MG: 30 TABLET ORAL at 08:49

## 2019-06-06 RX ADMIN — SIMVASTATIN 20 MG: 20 TABLET, FILM COATED ORAL at 20:48

## 2019-06-06 RX ADMIN — ENOXAPARIN SODIUM 40 MG: 40 INJECTION SUBCUTANEOUS at 08:48

## 2019-06-06 RX ADMIN — METOPROLOL TARTRATE 12.5 MG: 25 TABLET ORAL at 20:48

## 2019-06-06 RX ADMIN — PANTOPRAZOLE SODIUM 40 MG: 40 TABLET, DELAYED RELEASE ORAL at 06:05

## 2019-06-06 RX ADMIN — SERTRALINE 50 MG: 50 TABLET, FILM COATED ORAL at 08:49

## 2019-06-06 RX ADMIN — SERTRALINE 50 MG: 50 TABLET, FILM COATED ORAL at 20:48

## 2019-06-06 RX ADMIN — IPRATROPIUM BROMIDE AND ALBUTEROL SULFATE 1 AMPULE: .5; 3 SOLUTION RESPIRATORY (INHALATION) at 05:00

## 2019-06-06 RX ADMIN — METOPROLOL TARTRATE 12.5 MG: 25 TABLET ORAL at 08:49

## 2019-06-06 RX ADMIN — HYDROCODONE BITARTRATE AND ACETAMINOPHEN 2 TABLET: 5; 325 TABLET ORAL at 14:48

## 2019-06-06 RX ADMIN — IPRATROPIUM BROMIDE AND ALBUTEROL SULFATE 1 AMPULE: .5; 3 SOLUTION RESPIRATORY (INHALATION) at 12:08

## 2019-06-06 RX ADMIN — HYDROCODONE BITARTRATE AND ACETAMINOPHEN 2 TABLET: 5; 325 TABLET ORAL at 06:05

## 2019-06-06 RX ADMIN — IPRATROPIUM BROMIDE AND ALBUTEROL SULFATE 1 AMPULE: .5; 3 SOLUTION RESPIRATORY (INHALATION) at 17:53

## 2019-06-06 ASSESSMENT — PAIN DESCRIPTION - PROGRESSION
CLINICAL_PROGRESSION: GRADUALLY IMPROVING
CLINICAL_PROGRESSION: GRADUALLY WORSENING

## 2019-06-06 ASSESSMENT — PAIN - FUNCTIONAL ASSESSMENT: PAIN_FUNCTIONAL_ASSESSMENT: PREVENTS OR INTERFERES SOME ACTIVE ACTIVITIES AND ADLS

## 2019-06-06 ASSESSMENT — PAIN SCALES - GENERAL
PAINLEVEL_OUTOF10: 9
PAINLEVEL_OUTOF10: 9
PAINLEVEL_OUTOF10: 0
PAINLEVEL_OUTOF10: 5
PAINLEVEL_OUTOF10: 5
PAINLEVEL_OUTOF10: 9
PAINLEVEL_OUTOF10: 10
PAINLEVEL_OUTOF10: 0

## 2019-06-06 ASSESSMENT — PAIN DESCRIPTION - LOCATION
LOCATION: HIP
LOCATION: ABDOMEN;HIP
LOCATION: HIP

## 2019-06-06 ASSESSMENT — PAIN DESCRIPTION - ONSET: ONSET: ON-GOING

## 2019-06-06 ASSESSMENT — PAIN DESCRIPTION - ORIENTATION
ORIENTATION: RIGHT
ORIENTATION: RIGHT

## 2019-06-06 ASSESSMENT — PAIN DESCRIPTION - FREQUENCY
FREQUENCY: INTERMITTENT
FREQUENCY: INTERMITTENT

## 2019-06-06 ASSESSMENT — PAIN DESCRIPTION - PAIN TYPE: TYPE: ACUTE PAIN

## 2019-06-06 NOTE — PROGRESS NOTES
to require treatment by a licensed therapist to address functional deficits as outlined in the established plan of care.     TENNILLE [address]

## 2019-06-06 NOTE — PROGRESS NOTES
stenosis ([address]     left    Superior and Inferior Pubic ramus fracture, right, closed, initial encounter ([address] 05/21/2019    SVT (supraventricular tachycardia) ([address] 6/07    Temporal arteritis ([address]     Vertebral artery occlusion     left    Vitamin D deficiency 06/2017     Past Surgical History:   Procedure Laterality Date    CARDIAC CATHETERIZATION  5/07    CATARACT REMOVAL  right 1/08; left 2/08    CHOLECYSTECTOMY  1/03    COLONOSCOPY  11/06    COLOSTOMY  09/04/2018    REVERSAL OF COLOSTOMY    DILATION AND CURETTAGE OF UTERUS      ENDOSCOPY, COLON, DIAGNOSTIC      EYE SURGERY      EYE SURGERY Right 11/06/2017    Dr Franklin Sera in [address]      partial    OTHER SURGICAL HISTORY  05/27/2016    robotic assisted laparoscopic anterior colon resection and end colostomy    [place] OFFICE/OUTPT VISIT,PROCEDURE ONLY N/A 9/4/2018    COLOSTOMY REVERSAL AND PARASTOMAL HERNIA REPAIR performed by Nilsa House MD at [address] [facility]  2002    UPPER GASTROINTESTINAL ENDOSCOPY  11/06       Restrictions/Precautions:  Weight Bearing, Fall Risk     Right Lower Extremity Weight Bearing: Weight Bearing As Tolerated                      Prior Level of Function:  ADL Assistance: Needs assistance(HH aide assists pt 5 days a week x 2 hours.)  Homemaking Assistance: Needs assistance(aide assists with housekeeping and grocery shopping.)  Ambulation Assistance: Independent  Transfer Assistance: Independent  Additional Comments: Pt states amb with RW, O2 at night; has aide services 5 days/week that assists with laundry, showers and grocery shopping. Subjective:  Response To Previous Treatment: Patient with no complaints from previous session.   Family / Caregiver Present: No  Comments: lengthy time needed for toileting, pt. had a bm, sba and vc's for sit<-> stand from ets no assist needed with hygiene,  assist as needed with clothing mangement,  Subjective: pleasant, co-operative, nursing however    Discharge Recommendations:  Discharge Recommendations: ECF with PT or 24 hr. Assist at home with continued therapy    Patient Education:  Patient Education: POC,  6-3-19: car transfer, porch step with rolling walker,    Equipment Recommendations:  Equipment Needed: No(Pt has RW, 5CA)    Safety:  Type of devices: All fall risk precautions in place, Patient at risk for falls, Call light within reach, Left in chair, Chair alarm in place, Gait belt    Plan:  Times per week: 6x/wk  Times per day: Daily  Current Treatment Recommendations: Strengthening, Functional Mobility Training, Transfer Training, Balance Training, Gait Training, Stair training, Home Exercise Program, Equipment Evaluation, Education, & procurement, Safety Education & Training, Patient/Caregiver Education & Training, Endurance Training    Goals:  Patient goals : to get stronger    Short term goals  Time Frame for Short term goals: 1 week  Short term goal 1: Pt to transfer supine <--> sit MINx1 to enable pt to get in/out of bed. MET see LTG  Short term goal 2: Pt to transfer sit <--> stand CGA for increased functional mobility. Short term goal 3: Pt to ambulate >15 feet for household ambulation with use of RW and CGA  Short term goal 4: Pt to get in/out of car, CGA, for transportation needs. Short term goal 5: Pt to ascend/descend 6\" step with rw, CGa, for community mobility    Long term goals  Time Frame for Long term goals : 2 weeks  Long term goal 1: Patient will be able to perform bed mobility CGA to get in/out of bed  Long term goal 2: Patient will be able to perform functional transfers SBA to sit in bedside chair  Long term goal 3: Patient will be able to ambulate up to 50 feet with RW and CGA to walk around home safely  Long term goal 4: Pt to get in/out of car, SBA, for transportation needs.    Long term goal 5: Pt to ascend/descend 6\" step with RW, SBA, for community mobility  If patient is discharged prior to progress note completion, this note is to serve as the discharge note with all goals being unmet unless indicated otherwise.

## 2019-06-06 NOTE — PROGRESS NOTES
Superior and Inferior Pubic ramus fracture, right, closed, initial encounter ([facility] 05/21/2019    SVT (supraventricular tachycardia) ([facility] 6/07    Temporal arteritis ([facility]     Vertebral artery occlusion     left    Vitamin D deficiency 06/2017     Past Surgical History:   Procedure Laterality Date    CARDIAC CATHETERIZATION  5/07    CATARACT REMOVAL  right 1/08; left 2/08    CHOLECYSTECTOMY  1/03    COLONOSCOPY  11/06    COLOSTOMY  09/04/2018    REVERSAL OF COLOSTOMY    DILATION AND CURETTAGE OF UTERUS      ENDOSCOPY, COLON, DIAGNOSTIC      EYE SURGERY      EYE SURGERY Right 11/06/2017    Dr Yisel Torres in [address]      partial    OTHER SURGICAL HISTORY  05/27/2016    robotic assisted laparoscopic anterior colon resection and end colostomy    NM OFFICE/OUTPT VISIT,PROCEDURE ONLY N/A 9/4/2018    COLOSTOMY REVERSAL AND PARASTOMAL HERNIA REPAIR performed by Arthur Glasgow MD at [address] [facility]  2002    UPPER GASTROINTESTINAL ENDOSCOPY  11/06       Restrictions/Precautions:  Weight Bearing, Fall Risk     Right Lower Extremity Weight Bearing: Weight Bearing As Tolerated                      Prior Level of Function:  ADL Assistance: Needs assistance(HH aide assists pt 5 days a week x 2 hours.)  Homemaking Assistance: Needs assistance(aide assists with housekeeping and grocery shopping.)  Ambulation Assistance: Independent  Transfer Assistance: Independent  Additional Comments: Pt states amb with RW, O2 at night; has aide services 5 days/week that assists with laundry, showers and grocery shopping.     Subjective       Subjective: Patient seated in bedside chair upon arrival. Agreeable to OT session    Overall Orientation Status: Within Functional Limits         Pain:  Pain Assessment  Patient Currently in Pain: Yes(complains of pain only with movement; denies at rest)  Pain Assessment: 0-10  Pain Level: 5  Pain Location: Hip       Objective  Overall Cognitive Status: Exceptions  Cognition Comment: decreased safety noted throughout session, confusion, slow processing         ADL  Grooming: Stand by assistance(standing sinkside to wash hands after toileting tasks)  Toileting: Stand by assistance(for hygiene and clothing management with 1-2 UE release from walker)     Instrumental ADLs: Yes  Meal Prep and Light Housekeeping   Meal Prep and Light Housekeeping Level: Walker(RW)  Meal Prep and Light Housekeeping Level of Assistance: Contact guard assistance(-SBA)  Meal Preparation and Light Housekeeping: Patient completed IADL homemaking task this date of making chocolate pudding, washing dishes and putting them away. Task was graded to challenge activity tolerance, sequencing, safety within kitchen using RW, and BUE release from walker. Patient was able to retrieve all items and put away items in graded heights with use of RW  and min VCs for safety during the retrieval and transportation of items. Required minimal cueing throughout task for proper placement of walker, had tendency to leave towards side. Patient had difficulty reading small print on pudding box despite having glasses on, therapist read directions aloud and patient able to complete task. Patient required 0 seated rest breaks throughout task and 1 lengthy seated rest break after task with a standing endurance of 25 minutes.         Transfers  Sit to stand: Contact guard assistance(-close SBA from w/c; SBA from RTS)  Stand to sit: Stand by assistance  Transfer Comments: Cueing provided throughout for proper hand placement during transfers- better carry over noted  Toilet Transfers  Equipment Used: Raised toilet seat with rails  Toilet Transfer: Stand by assistance    Balance  Sitting Balance: Modified independent   Standing Balance: Contact guard assistance(-SBA)     Time: ~25 minutes  Activity: IADL tasks within kitchen     Functional Mobility  Functional - Mobility Device: Rolling Walker  Activity: To/from bathroom; To/From therapy gym  Assist Level: Contact guard assistance(-close SBA)  Functional Mobility Comments: Throughout therapy kitchen at slow pace, 1 significant LOB noted while in therapy kitchen requiring CGA for self correction. Required seated rest break after each trial of mobility         Activity Tolerance:  Activity Tolerance: Patient Tolerated treatment well;Patient limited by fatigue    Assessment:     Performance deficits / Impairments: Decreased functional mobility , Decreased ADL status, Decreased endurance, Decreased strength, Decreased balance, Decreased safe awareness, Decreased high-level IADLs  Prognosis: Good    Discharge Recommendations:  Discharge Recommendations: ECF with OT    Patient Education:  Patient Education: safety with RW in kitchen, ADL strategies, safety with transfers and mobility    Equipment Recommendations: Other: Pt would benefit from new shower chair as hers is approx 8 yrs old, not sturdy and screws are loosening. Discussed [facility]--pt very interested in equipment--follow-up about purchasing. May require BSC. Safety:  Safety Devices in place: Yes  Type of devices: Call light within reach, Chair alarm in place, Left in chair, Gait belt       Plan:  Times per week: 6x  Current Treatment Recommendations: Strengthening, Balance Training, Endurance Training, Functional Mobility Training, Patient/Caregiver Education & Training, Self-Care / ADL, Safety Education & Training, Home Management Training, Equipment Evaluation, Education, & procurement    Goals:  Patient goals : decrease pain to be able to go home soon.     Short term goals  Time Frame for Short term goals: 1 week  Short term goal 1: Pt will complete ADL routine with no cues for adapted tech and [facility] PRN to increase independende in self care tasks   Short term goal 2: Pt will complete functional ambulation to and from the BR with SBA and no cues for RW safety to increase independence in toileting routine  Short term goal 3: Pt will complete multi step light homemaking tasks with SBA and no > min cues for attention to task to increase independence in clothng retrieval.   Short term goal 4: Pt will complete dynamic standing task with B UE release for 8 minutes with SBA and no > 1 vc to attend to task to increase ease with sink side grooming  Short term goal 5: Pt will demo indep wtih BUE light strenthenng HEP  for improved UB strength and activity tolerance needed for ease with funcitonal transfers and ADLs  Long term goals  Time Frame for Long term goals : 3-4 weeks  Long term goal 1: PT will complete ADL routine with no cues for safe and adapted tech with S for completion to increase independence in self care tasks  Long term goal 2: PT will complete 2 step homemaking tasks with S and no cues for problem solving or attention to task to be able to prepare a simple meal   If patient is discharged prior to progress note completion, this note is to serve as the discharge note with all goals being unmet unless indicated otherwise.

## 2019-06-06 NOTE — PLAN OF CARE
Problem: Falls - Risk of:  Goal: Will remain free from falls  Description      Outcome: Ongoing  Pt will remain free of falls. Problem: Pain:  Goal: Pain level will decrease  Description      Outcome: Ongoing  Reposition frequently to alleviate pain. Problem: Cardiac:  Goal: Ability to maintain vital signs within normal range will improve  Description      Outcome: Ongoing  Monitor vs and report abn to dr. Munir Beltrán: Mood - Altered:  Goal: Mood stable  Description      Outcome: Ongoing  Redirect if needed, discuss discharge plans and point out positive aspects of ECF     Problem: Impaired respiratory status  Goal: Clear lung sounds  6/6/2019 0508 by Randi Walker RCP  Outcome: Ongoing  Encourage and have pt demonstrate incentive spirometry and deep breathing.

## 2019-06-06 NOTE — PLAN OF CARE
Problem: Impaired respiratory status  Goal: Clear lung sounds  Outcome: Ongoing   Breath sounds clear, continuing treatments as ordered.

## 2019-06-06 NOTE — PROGRESS NOTES
0830 Alert and oriented to person, place, and time. ROSA 3 mm- 2 mm left eye brisk. Unable to assess right due to prosthetic. Sclera white. Mucous membranes pink and moist. Hair dry and clean. Skin warm. Speech clear with no swallowing difficulty. Lungs sounds crackles and expiratory wheezing throughout. Respirations easy and regular. Non- productive cough. Oxygen on room air. Heart sounds strong and regular. Bowel sounds active in all four quadrants. Abdomen round and soft with rated pain at 5 to palpation. States passing gas. Denies difficulty with urination. Radial pulse strong bilaterally. Arm drift negative. INT site flushed with no resistance. Dressing on INT reinforced, dry, and intact with dry blood present. No edema or erythema present. Capillary refill less than three seconds. Skin turgor brisk. No edema noted. Pedal push and pull strong bilaterally. Missael sign negative with slight tenderness to right leg. Leg lift weak bilateral. Denies numbness and tingling. Bed in low position. Call light and overhead table within reach. Side rails up times 2. Denies need for anything else at this time.          Pura PNS

## 2019-06-06 NOTE — PLAN OF CARE
ipratropium-albuterol (DUONEB) nebulizer solution 1 ampule  1 ampule Inhalation Q6H WA Marian Daily MD   1 ampule at 06/06/19 1208    [START ON 6/9/2019] clopidogrel (PLAVIX) tablet 75 mg  75 mg Oral Daily Joo Galvez MD        [START ON 6/9/2019] aspirin EC tablet 325 mg  325 mg Oral Daily Joo Galvez MD        enoxaparin (LOVENOX) injection 40 mg  40 mg Subcutaneous Daily Joo Galvez MD   40 mg at 06/06/19 0848    acetaminophen (TYLENOL) tablet 650 mg  650 mg Oral Q4H PRN Marlen Pearl MD   650 mg at 06/04/19 1404    HYDROcodone-acetaminophen ([address]) 5-325 MG per tablet 1 tablet  1 tablet Oral Q4H PRN Marlen Pearl MD   1 tablet at 06/04/19 1014    Or    HYDROcodone-acetaminophen (NORCO) 5-325 MG per tablet 2 tablet  2 tablet Oral Q4H PRN Marlen Pearl MD   2 tablet at 06/06/19 1017    magnesium hydroxide (MILK OF MAGNESIA) 400 MG/5ML suspension 30 mL  30 mL Oral Daily PRN Marlen Pearl MD        ALPRAZolam Haig Omi) tablet 0.5 mg  0.5 mg Oral TID PRN Marlen Pearl MD   0.5 mg at 06/05/19 1732    cetirizine (ZYRTEC) tablet 5 mg  5 mg Oral Daily Marlen Pearl MD   5 mg at 06/06/19 0850    isosorbide mononitrate (IMDUR) extended release tablet 30 mg  30 mg Oral Daily Marlen Pearl MD   30 mg at 06/06/19 0849    levothyroxine (SYNTHROID) tablet 88 mcg  88 mcg Oral Daily Marlen Pearl MD   88 mcg at 06/06/19 0606    metoprolol tartrate (LOPRESSOR) tablet 12.5 mg  12.5 mg Oral BID Marlen Pearl MD   12.5 mg at 06/06/19 0849    predniSONE (DELTASONE) tablet 2 mg  2 mg Oral Daily Marlen Pearl MD   2 mg at 06/06/19 0849    sertraline (ZOLOFT) tablet 50 mg  50 mg Oral BID Marlen Pearl MD   50 mg at 06/06/19 0849    simvastatin (ZOCOR) tablet 20 mg  20 mg Oral Nightly Marlen Pearl MD   20 mg at 06/05/19 2031    therapeutic multivitamin-minerals 1 tablet  1 tablet Oral Daily Marlen Pearl MD   1 tablet at 06/06/19 0849       Plan of Care Problems and Goals      Problem: Pain    Goal: Pain Level will decrease   Problem: Falls- Risk of    Goal: Absence of falls    Goal: Absence of physical injury   Problem: Serum Glucose Level- Abnormal    Goal: Ability to maintain appropriate glucose levels will improve    Goal: Ability to maintain a stable neurologic state will improve   Problem: Cardiovascular Function Impairment Risk    Goal: Ability to maintain vital signs within normal range will improve    Goal: Cardiovascular alteration will improve    Goal: Complications related to the disease process, condition or treatment will be avoided or minimized     Problem: Risk of Pressure Ulcer    Goal: Absence of pressure ulcer   Problem: Risk of Bleeding    Goal: Will show no signs and symptoms of excessive bleeding     Problem: Urinary Elimination- Impaired    Goal: Decrease in number of episodes of urinary incontinence    Goal: Skin integrity will improve    Goal: Will remain free from infection    Goal: Ability to recognize the need to void and respond appropriately will improve   Problem: Nutrition    Goal: Optimal nutrition therapy   Problem: Discharge Planning    Goal: Continuum of care needs are met   Problem: Risk of Altered Mood    Goal: Mood stable   Problem: Anxiety    Goal: Anxiety is at manageable level    Goal: Level of anxiety will decrease    Problem: Mobility (PT)    Goal: will be able to perform bed mobility with contact guard assist to get in/out of bed    Goal: to transfer sit <--> stand with contact guard assist for increased functional mobility    Goal: to ambulate >15 feet for household ambulation with use of rolling walker and contact guard assist    Goal: to get in/out of car with contact guard assist for transportation needs    Goal: to ascend/descend 6\" step with rolling walker and contact guard assist for community mobility   Problem: Balance (OT)    Goal: will complete activity of daily living (ADL) routine with no cues for adapted technique and long handled adaptive equipment as needed to increase independende in self care tasks     Goal: will complete functional ambulation to and from the bathroom with stand by assist and no cues for rolling walker safety to increase independence in toileting routine    Goal: will complete multi-step light homemaking tasks with stand by assist and no greater than minimal cues for attention to task to increase independence in clothng retrieval    Goal: will complete dynamic standing task with bilateral upper extremity release for 8 minutes with stand by assist and no greater than 1 verbal cue to attend to task to increase ease with sink side grooming    Goal: will demonstrate independence wtih bilateral upper extremity, light strenthenng home exercise program for improved upper body strength and activity tolerance needed for ease with funcitonal transfers and activities of daily living (ADLs)   Problem: Communication/Dysarthria (ST)    Goal: will complete recall, short term memory and working memory tasks with 70% accuracy provided moderate cues to improve overall recall of newly learned information and medication information (Ex. Pain medication as needed)     Goal: will complete functional problem solving/reasoning tasks with 75% accuracy provided moderate cues to improve overall safety within hospital and home environment    Goal: will complete sequencing and divergent naming tasks with 70% accuracy provided moderate cues to improve overall thought organization    Goal: will complete executive functioning and basic comprehension tasks (Ex. Location of information, navigation) with 70% accuracy provided moderate cues to improve overall return to completion of basic ADL's and IADL's.      Goal: will complete sustained attention tasks with no more than 3 errors/redirections within 2 mintues in order to succesfuly return to completion of multi-step ADL tasks

## 2019-06-07 ENCOUNTER — APPOINTMENT (OUTPATIENT)
Dept: GENERAL RADIOLOGY | Age: 84
DRG: 561 | End: 2019-06-07
Attending: EMERGENCY MEDICINE
Payer: MEDICARE

## 2019-06-07 LAB — GLUCOSE BLD-MCNC: 99 MG/DL (ref 70–108)

## 2019-06-07 PROCEDURE — 6370000000 HC RX 637 (ALT 250 FOR IP): Performed by: EMERGENCY MEDICINE

## 2019-06-07 PROCEDURE — 97127 HC SP THER IVNTJ W/FOCUS COG FUNCJ: CPT

## 2019-06-07 PROCEDURE — 74018 RADEX ABDOMEN 1 VIEW: CPT

## 2019-06-07 PROCEDURE — 0220000000 HC SKILLED NURSING FACILITY

## 2019-06-07 PROCEDURE — 97535 SELF CARE MNGMENT TRAINING: CPT

## 2019-06-07 PROCEDURE — 6370000000 HC RX 637 (ALT 250 FOR IP): Performed by: ORTHOPAEDIC SURGERY

## 2019-06-07 PROCEDURE — 97110 THERAPEUTIC EXERCISES: CPT

## 2019-06-07 PROCEDURE — 1290000000 HC SEMI PRIVATE OTHER R&B

## 2019-06-07 PROCEDURE — 6360000002 HC RX W HCPCS: Performed by: ORTHOPAEDIC SURGERY

## 2019-06-07 PROCEDURE — 94640 AIRWAY INHALATION TREATMENT: CPT

## 2019-06-07 PROCEDURE — 82948 REAGENT STRIP/BLOOD GLUCOSE: CPT

## 2019-06-07 PROCEDURE — 94760 N-INVAS EAR/PLS OXIMETRY 1: CPT

## 2019-06-07 PROCEDURE — 97530 THERAPEUTIC ACTIVITIES: CPT

## 2019-06-07 RX ORDER — POLYETHYLENE GLYCOL 3350 17 G/17G
17 POWDER, FOR SOLUTION ORAL DAILY
Status: DISCONTINUED | OUTPATIENT
Start: 2019-06-07 | End: 2019-06-12 | Stop reason: HOSPADM

## 2019-06-07 RX ADMIN — PANTOPRAZOLE SODIUM 40 MG: 40 TABLET, DELAYED RELEASE ORAL at 16:45

## 2019-06-07 RX ADMIN — HYDROCODONE BITARTRATE AND ACETAMINOPHEN 1 TABLET: 5; 325 TABLET ORAL at 06:05

## 2019-06-07 RX ADMIN — IPRATROPIUM BROMIDE AND ALBUTEROL SULFATE 1 AMPULE: .5; 3 SOLUTION RESPIRATORY (INHALATION) at 05:19

## 2019-06-07 RX ADMIN — PREDNISONE 2 MG: 1 TABLET ORAL at 08:27

## 2019-06-07 RX ADMIN — POLYETHYLENE GLYCOL 3350 17 G: 17 POWDER, FOR SOLUTION ORAL at 16:44

## 2019-06-07 RX ADMIN — ENOXAPARIN SODIUM 40 MG: 40 INJECTION SUBCUTANEOUS at 08:26

## 2019-06-07 RX ADMIN — ACETAMINOPHEN 650 MG: 325 TABLET ORAL at 08:27

## 2019-06-07 RX ADMIN — METOPROLOL TARTRATE 12.5 MG: 25 TABLET ORAL at 20:20

## 2019-06-07 RX ADMIN — SERTRALINE 50 MG: 50 TABLET, FILM COATED ORAL at 08:27

## 2019-06-07 RX ADMIN — PANTOPRAZOLE SODIUM 40 MG: 40 TABLET, DELAYED RELEASE ORAL at 06:02

## 2019-06-07 RX ADMIN — LEVOTHYROXINE SODIUM 88 MCG: 88 TABLET ORAL at 06:02

## 2019-06-07 RX ADMIN — SERTRALINE 50 MG: 50 TABLET, FILM COATED ORAL at 20:20

## 2019-06-07 RX ADMIN — MULTIPLE VITAMINS W/ MINERALS TAB 1 TABLET: TAB at 08:27

## 2019-06-07 RX ADMIN — HYDROCODONE BITARTRATE AND ACETAMINOPHEN 2 TABLET: 5; 325 TABLET ORAL at 20:27

## 2019-06-07 RX ADMIN — MAGNESIUM HYDROXIDE 30 ML: 400 SUSPENSION ORAL at 12:52

## 2019-06-07 RX ADMIN — ISOSORBIDE MONONITRATE 30 MG: 30 TABLET ORAL at 08:27

## 2019-06-07 RX ADMIN — ACETAMINOPHEN 650 MG: 325 TABLET ORAL at 16:45

## 2019-06-07 RX ADMIN — HYDROCODONE BITARTRATE AND ACETAMINOPHEN 2 TABLET: 5; 325 TABLET ORAL at 12:52

## 2019-06-07 RX ADMIN — IPRATROPIUM BROMIDE AND ALBUTEROL SULFATE 1 AMPULE: .5; 3 SOLUTION RESPIRATORY (INHALATION) at 13:20

## 2019-06-07 RX ADMIN — CETIRIZINE HYDROCHLORIDE 5 MG: 10 TABLET, FILM COATED ORAL at 08:27

## 2019-06-07 RX ADMIN — METOPROLOL TARTRATE 12.5 MG: 25 TABLET ORAL at 08:26

## 2019-06-07 RX ADMIN — SIMVASTATIN 20 MG: 20 TABLET, FILM COATED ORAL at 20:20

## 2019-06-07 RX ADMIN — IPRATROPIUM BROMIDE AND ALBUTEROL SULFATE 1 AMPULE: .5; 3 SOLUTION RESPIRATORY (INHALATION) at 18:00

## 2019-06-07 ASSESSMENT — PAIN DESCRIPTION - LOCATION
LOCATION: GROIN;HIP
LOCATION: HIP;LEG;ABDOMEN
LOCATION: ABDOMEN
LOCATION: ABDOMEN

## 2019-06-07 ASSESSMENT — PAIN DESCRIPTION - DESCRIPTORS
DESCRIPTORS: ACHING
DESCRIPTORS: ACHING
DESCRIPTORS: PRESSURE;ACHING

## 2019-06-07 ASSESSMENT — PAIN DESCRIPTION - ONSET: ONSET: ON-GOING

## 2019-06-07 ASSESSMENT — PAIN SCALES - GENERAL
PAINLEVEL_OUTOF10: 9
PAINLEVEL_OUTOF10: 8
PAINLEVEL_OUTOF10: 10
PAINLEVEL_OUTOF10: 4
PAINLEVEL_OUTOF10: 8
PAINLEVEL_OUTOF10: 8
PAINLEVEL_OUTOF10: 9
PAINLEVEL_OUTOF10: 4
PAINLEVEL_OUTOF10: 6

## 2019-06-07 ASSESSMENT — PAIN DESCRIPTION - PAIN TYPE
TYPE: ACUTE PAIN

## 2019-06-07 ASSESSMENT — PAIN DESCRIPTION - FREQUENCY
FREQUENCY: CONTINUOUS
FREQUENCY: INTERMITTENT

## 2019-06-07 ASSESSMENT — PAIN DESCRIPTION - PROGRESSION
CLINICAL_PROGRESSION: GRADUALLY IMPROVING
CLINICAL_PROGRESSION: NOT CHANGED

## 2019-06-07 ASSESSMENT — PAIN DESCRIPTION - DIRECTION: RADIATING_TOWARDS: WHOLE ABDOMEN

## 2019-06-07 ASSESSMENT — PAIN DESCRIPTION - ORIENTATION
ORIENTATION: MID
ORIENTATION: RIGHT
ORIENTATION: RIGHT
ORIENTATION: MID

## 2019-06-07 ASSESSMENT — PAIN - FUNCTIONAL ASSESSMENT: PAIN_FUNCTIONAL_ASSESSMENT: PREVENTS OR INTERFERES SOME ACTIVE ACTIVITIES AND ADLS

## 2019-06-07 NOTE — PROGRESS NOTES
[address] [address]  INPATIENT PHYSICAL THERAPY  DAILY NOTE  - [facility] - 8E-71/071-A    Time In: 1332  Time Out: 1411  Timed Code Treatment Minutes: 44 Minutes  Minutes: 39          Date: 2019  Patient Name: Jason Coulter,  Gender:  female        MRN: [id number]  : 1933  (80 y.o.)     Referring Practitioner: Jimmie Ramirez, Ordering: Tomer Rodgers MD  Diagnosis: superior and inferior pubic ramus fracture, right, initial encounter   Additional Pertinent Hx: The patient is a 80 y.o. female  With multiple medical comorbidities who was admitted to ortho services for treatment of a right pelvic fracture. Patient has a history of breast CA with mets to the liver and is currently undergoing weekly chemotherapy. She completed a treatment yesterday and notes that she usually gets dizzy afterwards. She reports a fall in her home after an episode of dizziness yesterday. She fell onto her right side landing on the right hip. She was unable to bear weight afterwards. She denies hitting her head and no LOC. X-ray shows a right superior and inferior pubic rami fracture. Patient lives alone and is typically independent. She does ambulate with a walker. CT negative for R femur fracture.      Past Medical History:   Diagnosis Date    Anxiety     Blind right eye     Breast cancer metastasized to liver ([address] 05/10/2016    Liver lesion noted     Carotid stenosis      Left ICA 25%    Colostomy in place [facility]) 2016    Degenerative arthritis of cervical spine     GERD (gastroesophageal reflux disease)     Hypercholesteremia     Hypoestrogenism     Hypothyroidism     Lumbago     Lumbosacral spinal stenosis 2019    MDRO (multiple drug resistant organisms) resistance     pt stated cleared    Personal history of cardiac dysrhythmia     Sigmoid diverticulosis     Spondylolisthesis of lumbosacral region     Steroid-induced hyperglycemia     [facility]clavian artery stenosis [address] [address]     left    Superior and Inferior Pubic ramus fracture, right, closed, initial encounter ([facility] [address] 05/21/2019    SVT (supraventricular tachycardia) ([facility] [address] 6/07    Temporal arteritis ([facility] [address]     Vertebral artery occlusion     left    Vitamin D deficiency 06/2017     Past Surgical History:   Procedure Laterality Date    CARDIAC CATHETERIZATION  5/07    CATARACT REMOVAL  right 1/08; left 2/08    CHOLECYSTECTOMY  1/03    COLONOSCOPY  11/06    COLOSTOMY  09/04/2018    REVERSAL OF COLOSTOMY    DILATION AND CURETTAGE OF UTERUS      ENDOSCOPY, COLON, DIAGNOSTIC      EYE SURGERY      EYE SURGERY Right 11/06/2017    Dr Michelle Alas in [address]      partial    OTHER SURGICAL HISTORY  05/27/2016    robotic assisted laparoscopic anterior colon resection and end colostomy    CO OFFICE/OUTPT VISIT,PROCEDURE ONLY N/A 9/4/2018    COLOSTOMY REVERSAL AND PARASTOMAL HERNIA REPAIR performed by Vipul Ambriz MD at [address] [facility]  2002    UPPER GASTROINTESTINAL ENDOSCOPY  11/06       Restrictions/Precautions:  Weight Bearing, Fall Risk     Right Lower Extremity Weight Bearing: Weight Bearing As Tolerated          Prior Level of Function:  ADL Assistance: Needs assistance(HH aide assists pt 5 days a week x 2 hours.)  Homemaking Assistance: Needs assistance(aide assists with housekeeping and grocery shopping.)  Ambulation Assistance: Independent  Transfer Assistance: Independent  Additional Comments: Pt states amb with RW, O2 at night; has aide services 5 days/week that assists with laundry, showers and grocery shopping. Subjective:     Subjective: Patient laying in bed upon arrival. Patient agreeable to therapy with encouragement, patient c/o abdominal pain from constipation. Patient requested to use restroom during session. Applied ice pack to RLE at end of session. Pain:  Yes.   Pain Assessment  Pain Assessment: 0-10  Pain Level: 8(R hip pain 9/10, abdominal pain 8/10) Social/Functional:  Lives With: Alone  Type of Home: House  Home Layout: One level  Home Access: Level entry  Home Equipment: Rolling walker, Oxygen     Objective:  Supine to Sit: Stand by assistance(bed)  Sit to Supine: Stand by assistance(bed)  Scooting: Supervision    Transfers  Sit to Stand: Stand by assistance(from bed, from toilet)  Stand to sit: Stand by assistance(toilet, bed)       Ambulation 1  Surface: level tile  Device: Rolling Walker  Assistance: Stand by assistance;Contact guard assistance  Quality of Gait: decreased sivan, forward flexed posture, antalgic gait, decreased stance on R, decreased step length/height  Distance: 15ft x2         Balance  Comments: Dynamic standing in bathroom for clothing mangement and wash hands, SBA for safety and balance         Exercises:  Exercises  Comments: Performed BLE exercises seated long arc quads, marches, supine heel slides, hip abd/add, glute sets, quad sets, hooklying hip add pillow squeezes x10 reps. Attempted hooklying bridges, unable to complete due to increased pain and muscle cramping in RLE. Activity Tolerance:  Activity Tolerance: Patient Tolerated treatment well;Patient limited by pain; Patient limited by fatigue;Patient limited by endurance    Assessment: Body structures, Functions, Activity limitations: Decreased functional mobility , Decreased strength, Decreased safe awareness, Decreased balance, Decreased endurance, Increased Pain  Assessment: Patient tolerated session fair. Patient requires encouragement to participate, patient limited by pain and discomfort. Patient would benefit from continued skilled physical therapy to improve functional mobility. Prognosis: Good          Discharge Recommendations:  Discharge Recommendations: ECF with PT    Patient Education:  Patient Education: therex, transfers, bed mobility, gait    Equipment Recommendations:  Equipment Needed: No(Pt has RW, 8OU)    Safety:  Type of devices:  All fall risk precautions in place, Bed alarm in place, Call light within reach, Patient at risk for falls, Left in bed, Nurse notified    Plan:  Times per week: 6x/wk  Times per day: Daily  Current Treatment Recommendations: Strengthening, Functional Mobility Training, Transfer Training, Balance Training, Gait Training, Stair training, Home Exercise Program, Equipment Evaluation, Education, & procurement, Safety Education & Training, Patient/Caregiver Education & Training, Endurance Training    Goals:  Patient goals : to get stronger    Short term goals  Time Frame for Short term goals: 1 week  Short term goal 1: Pt to transfer supine <--> sit MINx1 to enable pt to get in/out of bed. MET see LTG  Short term goal 2: Pt to transfer sit <--> stand CGA for increased functional mobility. Short term goal 3: Pt to ambulate >15 feet for household ambulation with use of RW and CGA  Short term goal 4: Pt to get in/out of car, CGA, for transportation needs. Short term goal 5: Pt to ascend/descend 6\" step with rw, CGa, for community mobility    Long term goals  Time Frame for Long term goals : 2 weeks  Long term goal 1: Patient will be able to perform bed mobility CGA to get in/out of bed  Long term goal 2: Patient will be able to perform functional transfers SBA to sit in bedside chair  Long term goal 3: Patient will be able to ambulate up to 50 feet with RW and CGA to walk around home safely  Long term goal 4: Pt to get in/out of car, SBA, for transportation needs. Long term goal 5: Pt to ascend/descend 6\" step with RW, SBA, for community mobility  If patient is discharged prior to progress note completion, this note is to serve as the discharge note with all goals being unmet unless indicated otherwise.

## 2019-06-07 NOTE — PROGRESS NOTES
RECREATIONAL THERAPY  MISSED TREATMENT    [x]Transitional Care Unit  []Inpatient Rehabilitation    Date:  6/7/2019            Patient Name: Zofia Rodriguez           MRN: [id number]  Acct: [de-identified]          YOB: 1933 (80 y.o.)       Gender: female   Diagnosis: pubic ramus fracture RIght, closed  Physician: Referring Practitioner: Dr. Dhruv Lanier, Dr. Vijaya Clarke.  Bradley Attending    REASON FOR MISSED TREATMENT:    []Hold treatment per nursing request  []Patient refusal  []Family declined treatment  []Patient at testing and/or off the floor  []Patient unavailable, with PT/OT/nursing, etc.  [x]Other pt sound asleep and unavailable to go outside this am   Silvano Miller, CTRS    6/7/2019

## 2019-06-07 NOTE — PLAN OF CARE
Problem: Anxiety:  Goal: Level of anxiety will decrease  Description      6/7/2019 1833 by Roby Guzmán RN  Outcome: Ongoing   No episodes of anxiety calm and cooperative with staff  Problem: Bleeding:  Goal: Will show no signs and symptoms of excessive bleeding  Description      6/7/2019 1833 by Roby Guzmán RN  Outcome: Ongoing   No s/s excessive bleeding noted, plavix asa & lovenox  Problem: Cardiac:  Goal: Ability to maintain vital signs within normal range will improve  Description      6/7/2019 1833 by Roby Guzmán RN  Outcome: Ongoing   VS stable  Problem: Impaired respiratory status  Goal: Clear lung sounds  6/7/2019 1323 by Eddie Bundy RCP  Outcome: Ongoing   Lung sounds clear, no cough, no evidence of distress

## 2019-06-07 NOTE — PROGRESS NOTES
[address]  INPATIENT OCCUPATIONAL THERAPY  [facility] SKILLED NURSING UNIT  DAILY NOTE    Time:  Time In: 0700  Time Out: 6256  Timed Code Treatment Minutes: 47 Minutes  Minutes: 54          Date: 2019  Patient Name: Sita Levine,   Gender: female      Room: [facility]/071-A  MRN: [id number]  : 1933  (80 y.o.)  Referring Practitioner: Dr. Genesis Silva, Dr. Gm Huerta Attending  Diagnosis: pubic ramus fracture RIght, closed  Additional Pertinent Hx: The patient is a 80 y.o. female  With multiple medical comorbidities who was admitted to ortho services for treatment of a right pelvic fracture. Patient has a history of breast CA with mets to the liver and is currently undergoing weekly chemotherapy. She completed a treatment yesterday and notes that she usually gets dizzy afterwards. She reports a fall in her home after an episode of dizziness yesterday. She fell onto her right side landing on the right hip. She was unable to bear weight afterwards. She denies hitting her head and no LOC. X-ray shows a right superior and inferior pubic rami fracture. Patient lives alone and is typically independent. She does ambulate with a walker. CT negative for R femur fracture.     Past Medical History:   Diagnosis Date    Anxiety     Blind right eye     Breast cancer metastasized to liver ([address] 05/10/2016    Liver lesion noted     Carotid stenosis      Left ICA 25%    Colostomy in place [facility]) 2016    Degenerative arthritis of cervical spine     GERD (gastroesophageal reflux disease)     Hypercholesteremia     Hypoestrogenism     Hypothyroidism     Lumbago     Lumbosacral spinal stenosis 2019    MDRO (multiple drug resistant organisms) resistance     pt stated cleared    Personal history of cardiac dysrhythmia     Sigmoid diverticulosis     Spondylolisthesis of lumbosacral region     Steroid-induced hyperglycemia     Subclavian artery stenosis ([address]     left    decreased safety noted throughout session, slow processing         ADL  Grooming: Stand by assistance(standing sinkside x6 minutes for oral care)  UE Bathing: Setup(seated EOB)  LE Bathing: Stand by assistance(Close SBA standing to wash dc area/bottom. Able to wash BLE below knees including B feet with extended time with SBA)  UE Dressing: Setup(to doff pajama shirt and don sports bra, under shirt and tshirt seated EOB)  LE Dressing: Stand by assistance(threading BLE into pants/undergarment without use of reacher. Able to don slip on shoes seated EOB)        Bed mobility  Supine to Sit: Stand by assistance  Scooting: Supervision    Transfers  Sit to stand: Contact guard assistance(-SBA)  Stand to sit: Stand by assistance  Transfer Comments: Cueing provided throughout for proper hand placement during transfers- better carry over noted       Balance  Sitting Balance: Modified independent   Standing Balance: Stand by assistance         Functional Mobility  Functional - Mobility Device: Rolling Walker  Activity: To/from bathroom  Assist Level: Contact guard assistance        Activity Tolerance:  Activity Tolerance: Patient Tolerated treatment well;Patient limited by fatigue    Assessment:     Performance deficits / Impairments: Decreased functional mobility , Decreased ADL status, Decreased endurance, Decreased strength, Decreased balance, Decreased safe awareness, Decreased high-level IADLs  Prognosis: Good    Discharge Recommendations:  Discharge Recommendations: ECF with OT    Patient Education:  Patient Education: safety with transfers and mobility, ADL strategies    Equipment Recommendations: Other: Pt would benefit from new shower chair as hers is approx 8 yrs old, not sturdy and screws are loosening. Discussed [facility]--pt very interested in equipment--follow-up about purchasing. May require BSC.     Safety:  Safety Devices in place: Yes  Type of devices: Call light within reach, Chair alarm in place, Left in

## 2019-06-07 NOTE — PROGRESS NOTES
[address] [address]  INPATIENT SPEECH THERAPY  - [facility]    TIME   SLP Individual Minutes  Time In: 5486  Time Out: 1440  Minutes: 27  Timed Code Treatment Minutes: 27 Minutes       [x]Daily Note  []Progress Note  []Discharge Note    Date: 2019  Patient Name: Taiwo Escalera        MRN: [id number]    : 1933  (80 y.o.)  Gender: female   Primary Provider: Esmer Echeverria MD  Admitting Diagnosis:  Right pelvic fracture   Secondary Diagnosis: Cognitive deficits   Precautions: Fall Risk   Swallowing Status/Diet: Standard   Swallowing Strategies: Regular with thin   DATE of last MBS:  N/a   Pain:  9/10; RN Meseret notified     Subjective: Patient seen resting in bed upon ST arrival. Easily awaken provided verbal cues. Pleasantly engaged in session. SHORT TERM GOAL #1:  Goal 1: Patient will complete recall, short term memory and working memory tasks with 70% accuracy provided mod cues to improve overall recall of newly learned information and medication information (Ex.  Pain medication PRN)   INTERVENTIONS: Recall of familiar ST-  MAX cues  2-sentence retention- 3/10 indep, 2/10 min cues, 3/10 max cues, 0/10 not able to elicti  *poor working and immediate recal    Visual recall (pt provided picture and instructed to recall elements within)- 5/10 indep, 2/10 min cues, 3/10 mod cues  *pt appears to benefit from visualization strategy    SHORT TERM GOAL #2:  Goal 2: Patient will complete functional problem solving/reasoning tasks with 75% accuracy provided mod cues to improve overall safety within hospital and home environement  INTERVENTIONS: Categorical reasoning- 1/5 indep, 1/5 mod cues, 3/5 max cues  *poor mental flexibility and creativity     SHORT TERM GOAL #3:  Goal 3: Patient will complete sequencing and divergent naming tasks with 70% accuracy provided mod cues to improve overall thought organization  INTERVENTIONS: Sequencing of 4 BASIC, written steps to complete ADLs-  1. Brushing teeth- 2/4 min cues, 2/4 max cues  2. Planting a flower- 2/4 min cues, 1/4 mod cues, 1/4 max cues  3. Serving ice cream- 3/4 indep, 1/4 mod cues  *decreased sequential thought    SHORT TERM GOAL #4:  Goal 4: Patient will complete executive functioning and basic comprehension tasks (Ex. Location of information, navigation) with 70% accuracy provided mod cues to improve overall return to completion of basic ADL's and IADL's. INTERVENTIONS: Navigation of grocery advertisement- 5/7 indep, 2/7 mod cues  *decreased interpretation of numerical data     SHORT TERM GOAL #5:  Goal 5: Patient will complete sustained attention tasks with no more than 3 errors/redirections within 2 mintues in order to succesfuly return to completion of multi-step ADL tasks   INTERVENTIONS: Did not address d/t focus on other goals.   PREVIOUS SESSION:   Task 1:  Counting by 2's, 0-24- patient with overt difficulty and multiple errors, unable to complete task despite max cues provided  Task 2: Stating alphabet C-T- patient with overt difficulty and multiple errors x5+, unable to complete task accurately despite max cues   Task 3: Identifying x1 target within 2 paragraphs; 7/7 min cues + additional time        ASSESSMENT:  Assessment: [x]Progressing towards goals          []Not Progressing towards goals       Patient Tolerance of Treatment:   []Tolerated well [x]Tolerated fair []Required rest breaks []Fatigued  Plan for Next Session: Thought organization, problem solving, attention    Education:  Learner:  [x]Patient          []Significant Other          []Other  Education provided regarding:  [x]Goals and POC   []Diet and swallowing precautions    []Home Exercise Program  [x]Progress and/or discharge information  Method of Education:  [x]Discussion          [x]Demonstration          []Handout          []Other  Evaluation of Education:   [x]Verbalized understanding   []Demonstrates without assistance  [x]Demonstrates with assistance  [x]Needs further instruction     []No evidence of learning                  [x]No family present      Plan: [x]Continue with current plan of care    []Modify current plan of care as follows:    []Discharge patient    Discharge Location:    Services/Supervision Recommended:     [x]Patient continues to require treatment by a licensed therapist to address functional deficits as outlined in the established plan of care.     Priscilla Segovia M.S. [place] [id number]

## 2019-06-07 NOTE — PROGRESS NOTES
Patient Name: Milo Blanchard        MRN: [id number]    : 1933  (80 y.o.)  Gender: female   Principal Problem: Pubic ramus fracture, right, closed, initial encounter ([facility] [address]    Section C - Cognitive Patterns   . Should Brief Interview for Mental Status be Conducted  Attempt to conduct interview with all residents     1 0. No (resident is rarely/never understood) - then skip to and complete -, Staff Assessment for Mental Status  1. Yes - continue to , Repetition of Three Words   Brief Interview for Mental Status (BIMS)   . Repetition of Three Words         3 Ask resident: Robert Timothyns am going to say three words for you to remember. Please repeat the words after I have said all three. The words are sock, blue, and bed. Now tell me the three words. Number of words repeated after first attempt:  0. None  1. One  2. Two  3. Three  After residents first attempt, repeat the words using cues (sock, something to wear; blue, a color; bed, a piece of furniture). . Temporal Orientation (orientation to year, month , and day)         3 Ask resident: Dorothea Biggs tell me what year it is right now. Able to report correct year  0. Missed by >5 years or no answer  1. Missed by 2-5 years  2. Missed by 1 year  3. Correct         2 Ask resident: Sherry Doshi month are we in right now?   Able to report correct month  0. Missed by >1 month or no answer  1. Missed by 6 days to 1 month  2. Accurate within 5 days       1 Ask resident: Sherry Doshi day of the week is today?   Able to report correct day of the week  0. Incorrect or no answer  1. Correct   . Recall         1 Ask resident: Chao Arita go back to an earlier question. What were those three words that I asked you to repeat?   If unable to remember a word, give a cue (something to wear; a color; a piece of furniture) for that word. Able to recall sock  0. No - could not recall  1. Yes, after cueing (something to wear  2.  Yes, no cue required     2 Able to recall blue  0. No - could not recall  1. Yes, after cueing (a color)  2. Yes, no cue required       1 Able to recall bed  0. No - could not recall  1. Yes, after cueing (a piece of furniture)  2. Yes, no cue required                Patient Name: Shiraz Zuniga        MRN: [id number]    : 1933  (80 y.o.)  Gender: female   Principal Problem: Pubic ramus fracture, right, closed, initial encounter ([facility] [address]    Section D - Mood     Should Resident Mood Interview be Conducted? - Attempt to conduct interview with all residents   0. No (resident is rarely/never understood) à Skip to and complete -, Staff Assessment of Mood (PHQ 9-OV)  1. Yes à Continue to , Resident Mood Interview (PHQ-9) Enter Code    1   . Resident Mood Interview (PHQ-9)   Say to resident: Marissa Cobble the last 2 weeks, have you been bothered by any of the following problems?    If symptom is present, enter 1(yes) in column 1, Symptom Presence. Then move to column 2, Symptom Frequency, and indicate symptom frequency. 1. Symptom Presence                   2. Symptom                                                                  Frequency  0. No (enter 0 in column 2)          0. Never or 1 day          1. Yes (enter 0-3 in column 2)      1. 2-6 days           9. No Response                               2.  7-11 days                                                 3.  12-14 days   1. Symptom Presence 2. Symptom  Frequency        Enter Scores in boxes below   A. Little interest or pleasure in doing things 0 0   B. Feeling down, depressed, or hopeless 1 1   C. Trouble falling or staying asleep, or sleeping too much 1 1   D. Feeling tired or having little energy 1 2   E. Poor appetite or overeating 1 2   F. Feeling bad about yourself - or that you are a failure, or have let your family down 0 0   G. Trouble concentrating on things, such as reading the newspaper or watching television 0 0   H.  Moving or speaking so slowly unable to answer.

## 2019-06-07 NOTE — PLAN OF CARE
Problem: Falls - Risk of:  Goal: Will remain free from falls  Description      Outcome: Ongoing   No falls sustained at this time. Uses call light  Problem: Pain:  Goal: Pain level will decrease  Description      Outcome: Ongoing  C/o abdominal and right hip pain.  Used ice packs, heat pad,

## 2019-06-07 NOTE — PROGRESS NOTES
Family Medicine Progress Notes/Coverage    Today's Date: 6/7/19  8E-71/071-A  Medical Record # [id number]  Account # [de-identified]      Ms. Rod admitted on 5/24/2019        Subjective / Interval History :     Sore abdomen , + contipation  Reviewed notes, consults, laboratory and radiology results,    Objective:       Physical Exam:  Patient Vitals for the past 24 hrs:   BP Temp Temp src Pulse Resp SpO2   06/07/19 0822 (!) 144/65 97.8 °F (36.6 °C) Oral 68 20 91 %   06/07/19 0615 -- -- -- -- -- 92 %   06/07/19 0519 -- -- -- -- -- (!) 86 %   06/06/19 2040 (!) 147/65 97.9 °F (36.6 °C) Oral 65 18 92 %   06/06/19 1230 126/72 97.8 °F (36.6 °C) Oral 57 16 94 %       General Appearance:  Alert, cooperative, no distress, appears stated age   HEENT:  Neck:      Chest/Lungs:  Heart:    Abdomen:  Back:    Extremities:  Neurological Exam:        Assessment:     Admitting Diagnosis:    right pelvic fracture    Active Hospital Problems    Diagnosis Date Noted    Superior and Inferior Pubic ramus fracture, right, closed, initial encounter ([facility] [address] [facility] 05/21/2019     Priority: High    Anxiety [F41.9] 06/29/2016     Priority: High    Breast cancer metastasized to liver ([facility] [address] [C50.919, C78.7] 06/08/2016     Priority: High    On antineoplastic chemotherapy [Z79.899] 05/25/2019     Priority: Medium    Lumbosacral spinal stenosis [M48.07] 05/01/2019     Priority: Medium    Personal history of cardiac dysrhythmia [Z86.79]      Priority: Medium    Current chronic use of systemic steroids [Z79.52] 09/04/2018     Priority: Medium    H/O: CVA (cerebrovascular accident) [Z86.73] 11/06/2017     Priority: Medium    Coronary artery disease involving native coronary artery of native heart without angina pectoris [I25.10] 11/06/2017     Priority: Medium    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]      Priority: Medium    Hypothyroidism due to acquired atrophy of thyroid [E03.4] 03/03/2016     Priority: Medium    Hypercholesteremia [E78.00]      Priority: Medium    Temporal arteritis ([address] [M31.6]      Priority: Medium    GERD (gastroesophageal reflux disease) [K21.9]      Priority: Medium       Plan:     Discussed plans with the nursing staff  Continue PT/OT    Medications, Laboratories and Imaging results:    Scheduled Meds:   polyethylene glycol  17 g Oral Daily    pantoprazole  40 mg Oral BID AC    ipratropium-albuterol  1 ampule Inhalation Q6H WA    [START ON 6/9/2019] clopidogrel  75 mg Oral Daily    [START ON 6/9/2019] aspirin  325 mg Oral Daily    enoxaparin  40 mg Subcutaneous Daily    cetirizine  5 mg Oral Daily    isosorbide mononitrate  30 mg Oral Daily    levothyroxine  88 mcg Oral Daily    metoprolol tartrate  12.5 mg Oral BID    predniSONE  2 mg Oral Daily    sertraline  50 mg Oral BID    simvastatin  20 mg Oral Nightly    therapeutic multivitamin-minerals  1 tablet Oral Daily     Continuous Infusions:  PRN Meds:ipratropium-albuterol, acetaminophen, HYDROcodone 5 mg - acetaminophen **OR** HYDROcodone 5 mg - acetaminophen, magnesium hydroxide, ALPRAZolam    Imaging:    Lab Review :    Lab Results   Component Value Date    WBC 9.0 06/03/2019    HGB 9.9 (L) 06/03/2019    HCT 32.9 (L) 06/03/2019    MCV 91.9 06/03/2019     06/03/2019     Lab Results   Component Value Date    CREATININE 0.8 06/03/2019    BUN 21 06/03/2019     06/03/2019    K 4.3 06/03/2019     06/03/2019    CO2 26 06/03/2019     Lab Results   Component Value Date    CKTOTAL 41 10/16/2016     Lab Results   Component Value Date    ALT 15 12/22/2018    AST 14 12/22/2018    ALKPHOS 53 12/22/2018    BILITOT 0.6 12/22/2018     Lab Results   Component Value Date    LIPASE 11.4 12/22/2018         Electronically signed by Loco Chahal MD on 6/7/19 at 11:08 AM Marietta Medina M.D.

## 2019-06-08 LAB
GLUCOSE BLD-MCNC: 103 MG/DL (ref 70–108)
GLUCOSE BLD-MCNC: 73 MG/DL (ref 70–108)

## 2019-06-08 PROCEDURE — 6370000000 HC RX 637 (ALT 250 FOR IP): Performed by: ORTHOPAEDIC SURGERY

## 2019-06-08 PROCEDURE — 6370000000 HC RX 637 (ALT 250 FOR IP): Performed by: EMERGENCY MEDICINE

## 2019-06-08 PROCEDURE — 1290000000 HC SEMI PRIVATE OTHER R&B

## 2019-06-08 PROCEDURE — 94640 AIRWAY INHALATION TREATMENT: CPT

## 2019-06-08 PROCEDURE — 6370000000 HC RX 637 (ALT 250 FOR IP): Performed by: FAMILY MEDICINE

## 2019-06-08 PROCEDURE — 6360000002 HC RX W HCPCS: Performed by: ORTHOPAEDIC SURGERY

## 2019-06-08 PROCEDURE — 82948 REAGENT STRIP/BLOOD GLUCOSE: CPT

## 2019-06-08 RX ADMIN — SERTRALINE 50 MG: 50 TABLET, FILM COATED ORAL at 07:48

## 2019-06-08 RX ADMIN — IPRATROPIUM BROMIDE AND ALBUTEROL SULFATE 1 AMPULE: .5; 3 SOLUTION RESPIRATORY (INHALATION) at 06:23

## 2019-06-08 RX ADMIN — SIMVASTATIN 20 MG: 20 TABLET, FILM COATED ORAL at 23:00

## 2019-06-08 RX ADMIN — IPRATROPIUM BROMIDE AND ALBUTEROL SULFATE 1 AMPULE: .5; 3 SOLUTION RESPIRATORY (INHALATION) at 18:03

## 2019-06-08 RX ADMIN — CETIRIZINE HYDROCHLORIDE 5 MG: 10 TABLET, FILM COATED ORAL at 07:48

## 2019-06-08 RX ADMIN — METOPROLOL TARTRATE 12.5 MG: 25 TABLET ORAL at 23:00

## 2019-06-08 RX ADMIN — ALPRAZOLAM 0.5 MG: 0.5 TABLET ORAL at 23:08

## 2019-06-08 RX ADMIN — LEVOTHYROXINE SODIUM 88 MCG: 88 TABLET ORAL at 06:00

## 2019-06-08 RX ADMIN — ISOSORBIDE MONONITRATE 30 MG: 30 TABLET ORAL at 07:48

## 2019-06-08 RX ADMIN — PANTOPRAZOLE SODIUM 40 MG: 40 TABLET, DELAYED RELEASE ORAL at 06:00

## 2019-06-08 RX ADMIN — IPRATROPIUM BROMIDE AND ALBUTEROL SULFATE 1 AMPULE: .5; 3 SOLUTION RESPIRATORY (INHALATION) at 13:04

## 2019-06-08 RX ADMIN — PANTOPRAZOLE SODIUM 40 MG: 40 TABLET, DELAYED RELEASE ORAL at 16:55

## 2019-06-08 RX ADMIN — HYDROCODONE BITARTRATE AND ACETAMINOPHEN 2 TABLET: 5; 325 TABLET ORAL at 23:08

## 2019-06-08 RX ADMIN — MULTIPLE VITAMINS W/ MINERALS TAB 1 TABLET: TAB at 07:48

## 2019-06-08 RX ADMIN — SERTRALINE 50 MG: 50 TABLET, FILM COATED ORAL at 23:00

## 2019-06-08 RX ADMIN — HYDROCODONE BITARTRATE AND ACETAMINOPHEN 2 TABLET: 5; 325 TABLET ORAL at 12:29

## 2019-06-08 RX ADMIN — IPRATROPIUM BROMIDE AND ALBUTEROL SULFATE 1 AMPULE: .5; 3 SOLUTION RESPIRATORY (INHALATION) at 18:05

## 2019-06-08 RX ADMIN — ENOXAPARIN SODIUM 40 MG: 40 INJECTION SUBCUTANEOUS at 07:47

## 2019-06-08 RX ADMIN — HYDROCODONE BITARTRATE AND ACETAMINOPHEN 2 TABLET: 5; 325 TABLET ORAL at 07:45

## 2019-06-08 RX ADMIN — PREDNISONE 2 MG: 1 TABLET ORAL at 07:47

## 2019-06-08 RX ADMIN — METOPROLOL TARTRATE 12.5 MG: 25 TABLET ORAL at 07:48

## 2019-06-08 ASSESSMENT — PAIN SCALES - GENERAL
PAINLEVEL_OUTOF10: 8
PAINLEVEL_OUTOF10: 9

## 2019-06-09 LAB — GLUCOSE BLD-MCNC: 94 MG/DL (ref 70–108)

## 2019-06-09 PROCEDURE — 97110 THERAPEUTIC EXERCISES: CPT

## 2019-06-09 PROCEDURE — 1290000000 HC SEMI PRIVATE OTHER R&B

## 2019-06-09 PROCEDURE — 97530 THERAPEUTIC ACTIVITIES: CPT

## 2019-06-09 PROCEDURE — 6370000000 HC RX 637 (ALT 250 FOR IP): Performed by: ORTHOPAEDIC SURGERY

## 2019-06-09 PROCEDURE — 97535 SELF CARE MNGMENT TRAINING: CPT

## 2019-06-09 PROCEDURE — 97116 GAIT TRAINING THERAPY: CPT

## 2019-06-09 PROCEDURE — 94640 AIRWAY INHALATION TREATMENT: CPT

## 2019-06-09 PROCEDURE — 6370000000 HC RX 637 (ALT 250 FOR IP): Performed by: EMERGENCY MEDICINE

## 2019-06-09 PROCEDURE — 82948 REAGENT STRIP/BLOOD GLUCOSE: CPT

## 2019-06-09 RX ADMIN — CETIRIZINE HYDROCHLORIDE 5 MG: 10 TABLET, FILM COATED ORAL at 08:26

## 2019-06-09 RX ADMIN — PANTOPRAZOLE SODIUM 40 MG: 40 TABLET, DELAYED RELEASE ORAL at 17:12

## 2019-06-09 RX ADMIN — HYDROCODONE BITARTRATE AND ACETAMINOPHEN 2 TABLET: 5; 325 TABLET ORAL at 22:44

## 2019-06-09 RX ADMIN — ALPRAZOLAM 0.5 MG: 0.5 TABLET ORAL at 22:44

## 2019-06-09 RX ADMIN — ISOSORBIDE MONONITRATE 30 MG: 30 TABLET ORAL at 08:26

## 2019-06-09 RX ADMIN — IPRATROPIUM BROMIDE AND ALBUTEROL SULFATE 1 AMPULE: .5; 3 SOLUTION RESPIRATORY (INHALATION) at 05:32

## 2019-06-09 RX ADMIN — HYDROCODONE BITARTRATE AND ACETAMINOPHEN 2 TABLET: 5; 325 TABLET ORAL at 17:12

## 2019-06-09 RX ADMIN — POLYETHYLENE GLYCOL 3350 17 G: 17 POWDER, FOR SOLUTION ORAL at 08:26

## 2019-06-09 RX ADMIN — SIMVASTATIN 20 MG: 20 TABLET, FILM COATED ORAL at 22:42

## 2019-06-09 RX ADMIN — HYDROCODONE BITARTRATE AND ACETAMINOPHEN 1 TABLET: 5; 325 TABLET ORAL at 10:22

## 2019-06-09 RX ADMIN — SERTRALINE 50 MG: 50 TABLET, FILM COATED ORAL at 08:26

## 2019-06-09 RX ADMIN — IPRATROPIUM BROMIDE AND ALBUTEROL SULFATE 1 AMPULE: .5; 3 SOLUTION RESPIRATORY (INHALATION) at 10:44

## 2019-06-09 RX ADMIN — MULTIPLE VITAMINS W/ MINERALS TAB 1 TABLET: TAB at 08:26

## 2019-06-09 RX ADMIN — CLOPIDOGREL BISULFATE 75 MG: 75 TABLET ORAL at 08:26

## 2019-06-09 RX ADMIN — ASPIRIN 325 MG: 325 TABLET, DELAYED RELEASE ORAL at 08:25

## 2019-06-09 RX ADMIN — PANTOPRAZOLE SODIUM 40 MG: 40 TABLET, DELAYED RELEASE ORAL at 05:51

## 2019-06-09 RX ADMIN — LEVOTHYROXINE SODIUM 88 MCG: 88 TABLET ORAL at 05:51

## 2019-06-09 RX ADMIN — HYDROCODONE BITARTRATE AND ACETAMINOPHEN 1 TABLET: 5; 325 TABLET ORAL at 05:52

## 2019-06-09 RX ADMIN — METOPROLOL TARTRATE 12.5 MG: 25 TABLET ORAL at 08:26

## 2019-06-09 RX ADMIN — METOPROLOL TARTRATE 12.5 MG: 25 TABLET ORAL at 22:41

## 2019-06-09 RX ADMIN — IPRATROPIUM BROMIDE AND ALBUTEROL SULFATE 1 AMPULE: .5; 3 SOLUTION RESPIRATORY (INHALATION) at 17:58

## 2019-06-09 RX ADMIN — SERTRALINE 50 MG: 50 TABLET, FILM COATED ORAL at 22:42

## 2019-06-09 RX ADMIN — PREDNISONE 2 MG: 1 TABLET ORAL at 08:26

## 2019-06-09 ASSESSMENT — PAIN SCALES - GENERAL
PAINLEVEL_OUTOF10: 8
PAINLEVEL_OUTOF10: 5
PAINLEVEL_OUTOF10: 8
PAINLEVEL_OUTOF10: 8
PAINLEVEL_OUTOF10: 4
PAINLEVEL_OUTOF10: 10
PAINLEVEL_OUTOF10: 10

## 2019-06-09 ASSESSMENT — PAIN DESCRIPTION - ORIENTATION: ORIENTATION: RIGHT

## 2019-06-09 ASSESSMENT — PAIN DESCRIPTION - LOCATION: LOCATION: ABDOMEN;HIP

## 2019-06-09 ASSESSMENT — PAIN DESCRIPTION - PAIN TYPE: TYPE: ACUTE PAIN

## 2019-06-09 NOTE — PROGRESS NOTES
[address] [address]  INPATIENT PHYSICAL THERAPY  DAILY NOTE  [address] 8E-71/071-A    Time In: 1214  Time Out: 1245  Timed Code Treatment Minutes: 32 Minutes  Minutes: 31          Date: 2019  Patient Name: Liang Valente,  Gender:  female        MRN: [id number]  : 1933  (80 y.o.)     Referring Practitioner: Meg Galaviz, Ordering: Charmayne Sieving MD  Diagnosis: superior and inferior pubic ramus fracture, right, initial encounter   Additional Pertinent Hx: The patient is a 80 y.o. female  With multiple medical comorbidities who was admitted to ortho services for treatment of a right pelvic fracture. Patient has a history of breast CA with mets to the liver and is currently undergoing weekly chemotherapy. She completed a treatment yesterday and notes that she usually gets dizzy afterwards. She reports a fall in her home after an episode of dizziness yesterday. She fell onto her right side landing on the right hip. She was unable to bear weight afterwards. She denies hitting her head and no LOC. X-ray shows a right superior and inferior pubic rami fracture. Patient lives alone and is typically independent. She does ambulate with a walker. CT negative for R femur fracture.      Past Medical History:   Diagnosis Date    Anxiety     Blind right eye     Breast cancer metastasized to liver ([address] 05/10/2016    Liver lesion noted     Carotid stenosis      Left ICA 25%    Colostomy in place [facility]) 2016    Degenerative arthritis of cervical spine     GERD (gastroesophageal reflux disease)     Hypercholesteremia     Hypoestrogenism     Hypothyroidism     Lumbago     Lumbosacral spinal stenosis 2019    MDRO (multiple drug resistant organisms) resistance     pt stated cleared    Personal history of cardiac dysrhythmia     Sigmoid diverticulosis     Spondylolisthesis of lumbosacral region     Steroid-induced hyperglycemia     Subclavian artery Pain:   .  Pain Assessment  Pain Level: 10  Pain Type: Acute pain  Pain Location: Abdomen; Hip  Pain Orientation: Right       Social/Functional:  Lives With: Alone  Type of Home: House  Home Layout: One level  Home Access: Level entry  Home Equipment: Rolling walker, Oxygen     Objective:  Bridging: (no bed mobility completed this session)  Scooting: Supervision(scoot to edge of sitting surfaces)    Transfers  Sit to Stand: Stand by assistance  Stand to sit: Stand by assistance       Ambulation 1  Surface: level tile  Device: Rolling Walker  Assistance: Stand by assistance  Quality of Gait: decreased sivan, forward flexed posture, decreased stance on R, decreased step length/height  Distance: 75' x 1  Comments: \"That's about all I can walk right now\"            Exercises:  Exercises  Comments: Performed BLE exercises reclined in b/s chair: 12 reps each b le ap's, quad/glute sets, saq, heelslides, hip abd/add all for strengthening,endurance   Activity Tolerance:  Activity Tolerance: Patient limited by pain; Patient limited by fatigue  Activity Tolerance: pt. willing to do what she was able     Discharge Recommendations:  Discharge Recommendations: ECF with PT    Patient Education:  Patient Education: therex, transfers, bed mobility, gait    Equipment Recommendations:  Equipment Needed: No(Pt has RW, 2LB)    Safety:  Type of devices:  All fall risk precautions in place, Patient at risk for falls, Call light within reach, Left in chair, Chair alarm in place, Gait belt    Plan:  Times per week: 6x/wk  Times per day: Daily  Current Treatment Recommendations: Strengthening, Functional Mobility Training, Transfer Training, Balance Training, Gait Training, Stair training, Home Exercise Program, Equipment Evaluation, Education, & procurement, Safety Education & Training, Patient/Caregiver Education & Training, Endurance Training    Goals:  Patient goals : to get stronger    Short term goals  Time Frame for Short term goals: 1 week  Short term goal 1: Pt to transfer supine <--> sit MINx1 to enable pt to get in/out of bed. MET see LTG  Short term goal 2: Pt to transfer sit <--> stand CGA for increased functional mobility. Short term goal 3: Pt to ambulate >15 feet for household ambulation with use of RW and CGA  Short term goal 4: Pt to get in/out of car, CGA, for transportation needs. Short term goal 5: Pt to ascend/descend 6\" step with rw, CGa, for community mobility    Long term goals  Time Frame for Long term goals : 2 weeks  Long term goal 1: Patient will be able to perform bed mobility CGA to get in/out of bed  Long term goal 2: Patient will be able to perform functional transfers SBA to sit in bedside chair  Long term goal 3: Patient will be able to ambulate up to 50 feet with RW and CGA to walk around home safely  Long term goal 4: Pt to get in/out of car, SBA, for transportation needs. Long term goal 5: Pt to ascend/descend 6\" step with RW, SBA, for community mobility  If patient is discharged prior to progress note completion, this note is to serve as the discharge note with all goals being unmet unless indicated otherwise.

## 2019-06-09 NOTE — PROGRESS NOTES
Sandra Copeland 60  INPATIENT OCCUPATIONAL THERAPY  [facility] SKILLED NURSING UNIT  DAILY NOTE    Time:  Time In: 1000  Time Out: 1030  Timed Code Treatment Minutes: 30 Minutes  Minutes: 30          Date: 2019  Patient Name: Fitz Gusman,   Gender: female      Room: [facility]71/071-A  MRN: [id number]  : 1933  (80 y.o.)  Referring Practitioner: Dr. Jaciel Mac, Dr. Claudeen Quinones. Bradley Attending  Diagnosis: pubic ramus fracture RIght, closed  Additional Pertinent Hx: The patient is a 80 y.o. female  With multiple medical comorbidities who was admitted to ortho services for treatment of a right pelvic fracture. Patient has a history of breast CA with mets to the liver and is currently undergoing weekly chemotherapy. She completed a treatment yesterday and notes that she usually gets dizzy afterwards. She reports a fall in her home after an episode of dizziness yesterday. She fell onto her right side landing on the right hip. She was unable to bear weight afterwards. She denies hitting her head and no LOC. X-ray shows a right superior and inferior pubic rami fracture. Patient lives alone and is typically independent. She does ambulate with a walker. CT negative for R femur fracture.     Past Medical History:   Diagnosis Date    Anxiety     Blind right eye     Breast cancer metastasized to liver ([address] 05/10/2016    Liver lesion noted     Carotid stenosis      Left ICA 25%    Colostomy in place [facility]) 2016    Degenerative arthritis of cervical spine     GERD (gastroesophageal reflux disease)     Hypercholesteremia     Hypoestrogenism     Hypothyroidism     Lumbago     Lumbosacral spinal stenosis 2019    MDRO (multiple drug resistant organisms) resistance     pt stated cleared    Personal history of cardiac dysrhythmia     Sigmoid diverticulosis     Spondylolisthesis of lumbosacral region     Steroid-induced hyperglycemia     Subclavian artery stenosis ([address]     left    Superior and Inferior Pubic ramus fracture, right, closed, initial encounter [facility] 05/21/2019    SVT (supraventricular tachycardia) ([facility] 6/07    Temporal arteritis ([facility]     Vertebral artery occlusion     left    Vitamin D deficiency 06/2017     Past Surgical History:   Procedure Laterality Date    CARDIAC CATHETERIZATION  5/07    CATARACT REMOVAL  right 1/08; left 2/08    CHOLECYSTECTOMY  1/03    COLONOSCOPY  11/06    COLOSTOMY  09/04/2018    REVERSAL OF COLOSTOMY    DILATION AND CURETTAGE OF UTERUS      ENDOSCOPY, COLON, DIAGNOSTIC      EYE SURGERY      [facility] [address]      partial    OTHER SURGICAL HISTORY  05/27/2016    robotic assisted laparoscopic anterior colon resection and end colostomy    [place] OFFICE/OUTPT VISIT,PROCEDURE ONLY N/A 9/4/2018    COLOSTOMY REVERSAL AND PARASTOMAL HERNIA REPAIR performed by Braden Smith MD at [address] / [facility]ULMON[facility]  2002    UPPER GASTROINTESTINAL ENDOSCOPY  11/06       Restrictions/Precautions:  Weight Bearing, Fall Risk     Right Lower Extremity Weight Bearing: Weight Bearing As Tolerated           Prior Level of Function:  ADL Assistance: Needs assistance(HH aide assists pt 5 days a week x 2 hours.)  Homemaking Assistance: Needs assistance(aide assists with housekeeping and grocery shopping.)  Ambulation Assistance: Independent  Transfer Assistance: Independent  Additional Comments: Pt states amb with RW, O2 at night; has aide services 5 days/week that assists with laundry, showers and grocery shopping. Subjective       Subjective: Patient seated in  recliner upon arrival.     Pain:  Pain Assessment  Patient Currently in Pain: Yes(patient reports 10/10 R Hip.  Rn Notified)       Objective      ADL  Toileting: Stand by assistance           Transfers  Sit to stand: Contact guard assistance  Stand to sit: Contact guard assistance  Transfer Comments: verbal cues for safety with hand placement  Toilet Transfers  Equipment Used: Raised toilet seat with rails  Toilet Transfer: Stand by assistance  Toilet Transfers Comments: Use of grab bar    Balance  Sitting Balance: Modified independent   Standing Balance: Stand by assistance           Functional Mobility  Functional - Mobility Device: Rolling Walker  Activity: To/from bathroom  Assist Level: Contact guard assistance  Functional Mobility Comments: No LOB        Additional Activities: education on safety with transfers       Activity Tolerance:  Activity Tolerance: Patient Tolerated treatment well;Patient limited by pain    Assessment:     Performance deficits / Impairments: Decreased functional mobility , Decreased ADL status, Decreased endurance, Decreased strength, Decreased balance, Decreased safe awareness, Decreased high-level IADLs  Prognosis: Good    Discharge Recommendations:  Discharge Recommendations: ECF with OT    Patient Education:  Patient Education: education on safety with transfers    Equipment Recommendations: Other: Pt would benefit from new shower chair as hers is approx 8 yrs old, not sturdy and screws are loosening. Discussed LHAE--pt very interested in equipment--follow-up about purchasing. May require BSC. Safety:  Safety Devices in place: Yes  Type of devices: Call light within reach, Chair alarm in place, Left in chair, Gait belt       Plan:  Times per week: 6x  Current Treatment Recommendations: Strengthening, Balance Training, Endurance Training, Functional Mobility Training, Patient/Caregiver Education & Training, Self-Care / ADL, Safety Education & Training, Home Management Training, Equipment Evaluation, Education, & procurement    Goals:  Patient goals : decrease pain to be able to go home soon.     Short term goals  Time Frame for Short term goals: 1 week  Short term goal 1: Pt will complete ADL routine with no cues for adapted tech and LHAE PRN to increase independende in self care tasks   Short term goal 2: Pt will complete functional ambulation to and from the BR with SBA and no cues for RW safety to increase independence in toileting routine  Short term goal 3: Pt will complete multi step light homemaking tasks with SBA and no > min cues for attention to task to increase independence in clothng retrieval.   Short term goal 4: Pt will complete dynamic standing task with B UE release for 8 minutes with SBA and no > 1 vc to attend to task to increase ease with sink side grooming  Short term goal 5: Pt will demo indep wtih BUE light strenthenng HEP  for improved UB strength and activity tolerance needed for ease with funcitonal transfers and ADLs  Long term goals  Time Frame for Long term goals : 3-4 weeks  Long term goal 1: PT will complete ADL routine with no cues for safe and adapted tech with S for completion to increase independence in self care tasks  Long term goal 2: PT will complete 2 step homemaking tasks with S and no cues for problem solving or attention to task to be able to prepare a simple meal   If patient is discharged prior to progress note completion, this note is to serve as the discharge note with all goals being unmet unless indicated otherwise.

## 2019-06-09 NOTE — PLAN OF CARE
Problem: Falls - Risk of:  Goal: Will remain free from falls  Description      Outcome: Ongoing  Note:   Pt remains free from falls. Problem: Falls - Risk of:  Goal: Absence of physical injury  Description      Outcome: Ongoing  Note:   Pt remains absent of physical injury. Problem: Pain:  Goal: Pain level will decrease  Description      Outcome: Ongoing  Note:   Pt reports pain level 9/10. PRN Norco and tylenol provided as needed. Heating pad and ice packs provided as needed. Problem: Anxiety:  Goal: Level of anxiety will decrease  Description      Outcome: Ongoing  Note:   Pt calm and cooperative. Problem: Bleeding:  Goal: Will show no signs and symptoms of excessive bleeding  Description      Outcome: Ongoing  Note:   Pt shows no s/s of excessive bleeding.

## 2019-06-10 LAB — GLUCOSE BLD-MCNC: 94 MG/DL (ref 70–108)

## 2019-06-10 PROCEDURE — 6370000000 HC RX 637 (ALT 250 FOR IP): Performed by: EMERGENCY MEDICINE

## 2019-06-10 PROCEDURE — 97116 GAIT TRAINING THERAPY: CPT

## 2019-06-10 PROCEDURE — 82948 REAGENT STRIP/BLOOD GLUCOSE: CPT

## 2019-06-10 PROCEDURE — 6370000000 HC RX 637 (ALT 250 FOR IP): Performed by: ORTHOPAEDIC SURGERY

## 2019-06-10 PROCEDURE — 1290000000 HC SEMI PRIVATE OTHER R&B

## 2019-06-10 PROCEDURE — 97110 THERAPEUTIC EXERCISES: CPT

## 2019-06-10 PROCEDURE — 94640 AIRWAY INHALATION TREATMENT: CPT

## 2019-06-10 PROCEDURE — 97535 SELF CARE MNGMENT TRAINING: CPT

## 2019-06-10 PROCEDURE — 97127 HC SP THER IVNTJ W/FOCUS COG FUNCJ: CPT

## 2019-06-10 PROCEDURE — 97530 THERAPEUTIC ACTIVITIES: CPT

## 2019-06-10 RX ADMIN — IPRATROPIUM BROMIDE AND ALBUTEROL SULFATE 1 AMPULE: .5; 3 SOLUTION RESPIRATORY (INHALATION) at 18:02

## 2019-06-10 RX ADMIN — LEVOTHYROXINE SODIUM 88 MCG: 88 TABLET ORAL at 06:05

## 2019-06-10 RX ADMIN — PANTOPRAZOLE SODIUM 40 MG: 40 TABLET, DELAYED RELEASE ORAL at 06:05

## 2019-06-10 RX ADMIN — PANTOPRAZOLE SODIUM 40 MG: 40 TABLET, DELAYED RELEASE ORAL at 17:21

## 2019-06-10 RX ADMIN — IPRATROPIUM BROMIDE AND ALBUTEROL SULFATE 1 AMPULE: .5; 3 SOLUTION RESPIRATORY (INHALATION) at 05:42

## 2019-06-10 RX ADMIN — SERTRALINE 50 MG: 50 TABLET, FILM COATED ORAL at 08:44

## 2019-06-10 RX ADMIN — ISOSORBIDE MONONITRATE 30 MG: 30 TABLET ORAL at 08:44

## 2019-06-10 RX ADMIN — SERTRALINE 50 MG: 50 TABLET, FILM COATED ORAL at 22:28

## 2019-06-10 RX ADMIN — METOPROLOL TARTRATE 12.5 MG: 25 TABLET ORAL at 08:44

## 2019-06-10 RX ADMIN — CETIRIZINE HYDROCHLORIDE 5 MG: 10 TABLET, FILM COATED ORAL at 08:44

## 2019-06-10 RX ADMIN — MAGNESIUM HYDROXIDE 30 ML: 400 SUSPENSION ORAL at 06:29

## 2019-06-10 RX ADMIN — PREDNISONE 2 MG: 1 TABLET ORAL at 08:44

## 2019-06-10 RX ADMIN — CLOPIDOGREL BISULFATE 75 MG: 75 TABLET ORAL at 08:44

## 2019-06-10 RX ADMIN — HYDROCODONE BITARTRATE AND ACETAMINOPHEN 1 TABLET: 5; 325 TABLET ORAL at 06:06

## 2019-06-10 RX ADMIN — HYDROCODONE BITARTRATE AND ACETAMINOPHEN 2 TABLET: 5; 325 TABLET ORAL at 14:03

## 2019-06-10 RX ADMIN — POLYETHYLENE GLYCOL 3350 17 G: 17 POWDER, FOR SOLUTION ORAL at 08:44

## 2019-06-10 RX ADMIN — IPRATROPIUM BROMIDE AND ALBUTEROL SULFATE 1 AMPULE: .5; 3 SOLUTION RESPIRATORY (INHALATION) at 10:22

## 2019-06-10 RX ADMIN — METOPROLOL TARTRATE 12.5 MG: 25 TABLET ORAL at 22:27

## 2019-06-10 RX ADMIN — MULTIPLE VITAMINS W/ MINERALS TAB 1 TABLET: TAB at 08:44

## 2019-06-10 RX ADMIN — SIMVASTATIN 20 MG: 20 TABLET, FILM COATED ORAL at 22:28

## 2019-06-10 RX ADMIN — ASPIRIN 325 MG: 325 TABLET, DELAYED RELEASE ORAL at 08:44

## 2019-06-10 ASSESSMENT — PAIN DESCRIPTION - DIRECTION: RADIATING_TOWARDS: RIGHT HIP

## 2019-06-10 ASSESSMENT — PAIN SCALES - GENERAL
PAINLEVEL_OUTOF10: 6
PAINLEVEL_OUTOF10: 10
PAINLEVEL_OUTOF10: 3
PAINLEVEL_OUTOF10: 0
PAINLEVEL_OUTOF10: 10
PAINLEVEL_OUTOF10: 0

## 2019-06-10 ASSESSMENT — PAIN DESCRIPTION - PROGRESSION: CLINICAL_PROGRESSION: NOT CHANGED

## 2019-06-10 ASSESSMENT — PAIN DESCRIPTION - LOCATION
LOCATION: ABDOMEN;BACK
LOCATION: ABDOMEN;HIP

## 2019-06-10 ASSESSMENT — PAIN DESCRIPTION - DESCRIPTORS: DESCRIPTORS: ACHING

## 2019-06-10 ASSESSMENT — PAIN DESCRIPTION - ONSET: ONSET: ON-GOING

## 2019-06-10 ASSESSMENT — PAIN DESCRIPTION - PAIN TYPE: TYPE: ACUTE PAIN;CHRONIC PAIN

## 2019-06-10 ASSESSMENT — PAIN DESCRIPTION - FREQUENCY: FREQUENCY: CONTINUOUS

## 2019-06-10 ASSESSMENT — PAIN DESCRIPTION - ORIENTATION: ORIENTATION: LOWER

## 2019-06-10 NOTE — PLAN OF CARE
Problem: Falls - Risk of:  Goal: Will remain free from falls  Description      Outcome: Ongoing   No falls sustained at this time. Uses call light appropriately. Problem: Pain:  Goal: Control of acute pain  Description      Outcome: Ongoing   C/o intermittent rt hip and abdominal pain. PRN norco and heat pad   Problem: Anxiety:  Goal: Level of anxiety will decrease  Description      Outcome: Ongoing   No episodes of anxiety noted today. Calm and cooperative with staff  Problem: Bleeding:  Goal: Will show no signs and symptoms of excessive bleeding  Description      Outcome: Ongoing   No s/s of excessive bleeding. Problem: Serum Glucose Level - Abnormal:  Goal: Ability to maintain appropriate glucose levels will improve  Description      Outcome: Ongoing   AM chem, blood sugars stable. Problem: IP BALANCE  Goal: LTG - Patient will maintain balance to allow for safe/functional mobility  Outcome: Ongoing   1 assist with walker and gait belt  Problem: Discharge Planning:  Goal: Patients continuum of care needs are met  Description      Outcome: Ongoing   Plans to discharge to [address] at discharge. Team conference tomorrow. Problem: Urinary Elimination:  Goal: Incidence of incontinence will decrease  Description      Outcome: Ongoing   Continent of bladder throughout the shift. Uses pullups.

## 2019-06-10 NOTE — PROGRESS NOTES
[address] [address]  INPATIENT SPEECH THERAPY  - [facility] SKILLED NURSING UNIT    TIME   SLP Individual Minutes  Time In: 1130  Time Out: 1200  Minutes: 30  Timed Code Treatment Minutes: 30 Minutes       []Daily Note  [x]Progress Note  []Discharge Note    Date: 6/10/2019  Patient Name: Nelson Hanson        MRN: [id number]    : 1933  (80 y.o.)  Gender: female   Primary Provider: Teto Caldwell MD  Admitting Diagnosis:  Right pelvic fracture   Secondary Diagnosis: Cognitive deficits   Precautions: Fall Risk   Swallowing Status/Diet: Standard   Swallowing Strategies: Regular with thin   DATE of last MBS:  N/a   Pain:  5/10; RN notified     Subjective: Patient seen sitting in recliner alert and cooperative. Fairly engaged in session. Patient with phone call midway through session; returned to ST session following 2-3 minutes. SHORT TERM GOAL #1:  Goal 1: Patient will complete recall, short term memory and working memory tasks with 70% accuracy provided mod cues to improve overall recall of newly learned information and medication information (Ex. Pain medication PRN)  GOAL NOT MET. CONTINUE  INTERVENTIONS:   Orientation:  4/4 indep    Recall of 4 basic/familiar locations:  Immediate recall: 4/4 indep. 20 minute delay:  4/4 MAX cues       SHORT TERM GOAL #2:  Goal 2: Patient will complete functional problem solving/reasoning tasks with 75% accuracy provided mod cues to improve overall safety within hospital and home environement GOAL NOT MET. CONTINUE. INTERVENTIONS: DNT secondary to focus on other goals  Previous tx: Categorical reasoning- 1/5 indep, 1/5 mod cues, 3/5 max cues  *poor mental flexibility and creativity     SHORT TERM GOAL #3:  Goal 3: Patient will complete sequencing and divergent naming tasks with 70% accuracy provided mod cues to improve overall thought organization GOAL NOT MET.   CONTINUE   INTERVENTIONS:   Sequencing x4 basic words into alphabetical order:  2/4 mod cues, 2/4 max cues  *decreased sequential thought    SHORT TERM GOAL #4:  Goal 4: Patient will complete executive functioning and basic comprehension tasks (Ex. Location of information, navigation) with 70% accuracy provided mod cues to improve overall return to completion of basic ADL's and IADL's. GOAL NOT MET. CONTINUE  INTERVENTIONS: Location/identification of food item prices presented basic low stimuli menu:  2/10 indep, 3/10 mod cues, 5/10 max cues  *poor thought organization and placement     SHORT TERM GOAL #5:  Goal 5: Patient will complete sustained attention tasks with no more than 3 errors/redirections within 2 mintues in order to succesfuly return to completion of multi-step ADL tasks  GOAL NOT MET. CONTINUE  INTERVENTIONS: Patient with overt difficulty attending to tasks reporting to ST \"I just have too much on my mind. My brain won't think. \"      PREVIOUS SESSION:   Task 1:  Counting by 2's, 0-24- patient with overt difficulty and multiple errors, unable to complete task despite max cues provided  Task 2: Stating alphabet C-T- patient with overt difficulty and multiple errors x5+, unable to complete task accurately despite max cues   Task 3: Identifying x1 target within 2 paragraphs; 7/7 min cues + additional time          Long-term Goals  Timeframe for Long-term Goals: 2 weeks   Goal 1: Patient will improve overall cognitive function to a supervision level in order to successfuly and safely return to the home setting with assistance upon discharge  GOAL NOT MET. CONTINUE    ASSESSMENT:  Patient has met 0/5 STG's and 0/1 LTG's. Limited progress d/t patient with varying level of pain within therapy session and poor attention to cognitive tasks with poor carryover. Patient continues to present with severe cognitive deficits as evidence by impaired memory, carryover, thought organization, reasoning, problem solving, executive functioning and attention.  Patient strengths within the areas of social skills and interactions at the surface level. No dysarthria, dysphonia, dysphagia or expressive/receptive language deficits. ST with HIGH level of safety concerns in regards to potential discharge to home setting. ST with concerns of potential accidental medication overdose and high fall risk. STRICT 24/hour supervision is highly recommended upon discharge with strict restriction to driving d/t ongoing cognitive impairments and safety concerns. Patient would greatly benefit from skilled ST services in order to target cognitive deficits in order to successfully return to the home setting with high level of support and assistance upon discharge. Assessment: [x]Progressing towards goals          []Not Progressing towards goals       Patient Tolerance of Treatment:   []Tolerated well [x]Tolerated fair []Required rest breaks []Fatigued  Plan for Next Session: Thought organization, problem solving, attention , SAFETY   Education:  Learner:  [x]Patient          []Significant Other          []Other  Education provided regarding:  [x]Goals and POC   []Diet and swallowing precautions    []Home Exercise Program  [x]Progress and/or discharge information  Method of Education:  [x]Discussion          [x]Demonstration          []Handout          []Other  Evaluation of Education:   [x]Verbalized understanding   []Demonstrates without assistance  [x]Demonstrates with assistance  [x]Needs further instruction     []No evidence of learning                  [x]No family present      Plan: [x]Continue with current plan of care    []Modify current plan of care as follows:    []Discharge patient    Discharge Location:    Services/Supervision Recommended:     [x]Patient continues to require treatment by a licensed therapist to address functional deficits as outlined in the established plan of care.     Sharon Madden M.S. Jose De Jesusmarilin

## 2019-06-10 NOTE — PROGRESS NOTES
Family Medicine Progress Notes/Coverage    Today's Date: 6/10/19  [id number]-71/071-[id number]  Medical Record # [id number]  Account # [de-identified]      Ms. Rod admitted on 5/24/2019        Subjective / Interval History :     + right hip pain otherwise doing well, No SOB, No chest pain , No fatigue.  No nausea nor abdominal pain  Reviewed notes, consults, laboratory and radiology results,    Objective:       Physical Exam:  Patient Vitals for the past 24 hrs:   BP Temp Temp src Pulse Resp SpO2 Weight   06/10/19 0826 (!) 117/58 97.8 °F (36.6 °C) Oral 61 13 90 % --   06/10/19 0701 -- -- -- -- -- -- 154 lb 14.4 oz (70.3 kg)   06/09/19 1951 131/60 98.1 °F (36.7 °C) Oral 64 16 96 % --       General Appearance:  Alert, cooperative, no distress, appears stated age   HEENT:  Neck:      Chest/Lungs:  Heart: CTA  RRR   Abdomen:  Back:    Extremities:  Neurological Exam: No edema  WNL       Assessment:     Admitting Diagnosis:    right pelvic fracture    Active Hospital Problems    Diagnosis Date Noted    Superior and Inferior Pubic ramus fracture, right, closed[facility] Reji Harden 05/21/2019     Priority: High    Anxiety [F41.9] 06/29/2016     Priority: High    Breast cancer metastasized to liver [facility] [C50.919, C78.7] 06/08/2016     Priority: High    On antineoplastic chemotherapy [Z79.899] 05/25/2019     Priority: Medium    Lumbosacral spinal stenosis [M48.07] 05/01/2019     Priority: Medium    Personal history of cardiac dysrhythmia [Z86.79]      Priority: Medium    Current chronic use of systemic steroids [Z79.52] 09/04/2018     Priority: Medium    H/O: CVA (cerebrovascular accident) [Z86.73] 11/06/2017     Priority: Medium    Coronary artery disease involving native coronary artery of native heart without angina pectoris [I25.10] 11/06/2017     Priority: Medium    Blind Rogers Bates M.D.

## 2019-06-10 NOTE — PROGRESS NOTES
[address] THERAPY  - [facility] SKILLED NURSING UNIT  DAILY NOTE    Time:  Time In: 8817  Time Out: 4204  Timed Code Treatment Minutes: 47 Minutes  Minutes: 54          Date: 6/10/2019  Patient Name: Vee Jarquin,   Gender: female      Room: [facility]71/071-A  MRN: [id number]  : 1933  (80 y.o.)  Referring Practitioner: Dr. Gayatri Riojas, Dr. Anshu Wiley. Bradley Attending  Diagnosis: pubic ramus fracture RIght, closed  Additional Pertinent Hx: The patient is a 80 y.o. female  With multiple medical comorbidities who was admitted to ortho services for treatment of a right pelvic fracture. Patient has a history of breast CA with mets to the liver and is currently undergoing weekly chemotherapy. She completed a treatment yesterday and notes that she usually gets dizzy afterwards. She reports a fall in her home after an episode of dizziness yesterday. She fell onto her right side landing on the right hip. She was unable to bear weight afterwards. She denies hitting her head and no LOC. X-ray shows a right superior and inferior pubic rami fracture. Patient lives alone and is typically independent. She does ambulate with a walker. CT negative for R femur fracture.     Past Medical History:   Diagnosis Date    Anxiety     Blind right eye     Breast cancer metastasized to liver ([address] 05/10/2016    Liver lesion noted     Carotid stenosis      Left ICA 25%    Colostomy in place [facility]) 2016    Degenerative arthritis of cervical spine     GERD (gastroesophageal reflux disease)     Hypercholesteremia     Hypoestrogenism     Hypothyroidism     Lumbago     Lumbosacral spinal stenosis 2019    MDRO (multiple drug resistant organisms) resistance     pt stated cleared    Personal history of cardiac dysrhythmia     Sigmoid diverticulosis     Spondylolisthesis of lumbosacral region     Steroid-induced hyperglycemia     Subclavian artery stenosis ([address]     left    Superior and Inferior Pubic ramus fracture, right, closed, initial encounter ([facility] 05/21/2019    SVT (supraventricular tachycardia) ([facility] 6/07    Temporal arteritis ([facility]     Vertebral artery occlusion     left    Vitamin D deficiency 06/2017     Past Surgical History:   Procedure Laterality Date    CARDIAC CATHETERIZATION  5/07    CATARACT REMOVAL  right 1/08; left 2/08    CHOLECYSTECTOMY  1/03    COLONOSCOPY  11/06    COLOSTOMY  09/04/2018    REVERSAL OF COLOSTOMY    DILATION AND CURETTAGE OF UTERUS      ENDOSCOPY, COLON, DIAGNOSTIC      EYE SURGERY      EYE SURGERY Right 11/06/2017    Dr Pollack Minus in [address]      partial    OTHER SURGICAL HISTORY  05/27/2016    robotic assisted laparoscopic anterior colon resection and end colostomy    [place] OFFICE/OUTPT VISIT,PROCEDURE ONLY N/A 9/4/2018    COLOSTOMY REVERSAL AND PARASTOMAL HERNIA REPAIR performed by Nilda Traylor MD at [address] / [facility]UL[facility]  2002    UPPER GASTROINTESTINAL ENDOSCOPY  11/06       Restrictions/Precautions:  Weight Bearing, Fall Risk     Right Lower Extremity Weight Bearing: Weight Bearing As Tolerated       Prior Level of Function:  ADL Assistance: Needs assistance(HH aide assists pt 5 days a week x 2 hours.)  Homemaking Assistance: Needs assistance(aide assists with housekeeping and grocery shopping.)  Ambulation Assistance: Independent  Transfer Assistance: Independent  Additional Comments: Pt states amb with RW, O2 at night; has aide services 5 days/week that assists with laundry, showers and grocery shopping.     Subjective     Subjective: Patient seated upright in bed finishing breakfast upon arrival. Agreeable to OT session  Overall Orientation Status: Within Functional Limits         Pain:  Pain Assessment  Patient Currently in Pain: Yes  Pain Assessment: 0-10  Pain Level: 3  Pain Location: Abdomen;Hip       Objective  Overall Cognitive Status: Exceptions  Cognition Comment: decreased safety noted throughout session, slow processing      ADL  Grooming: Stand by assistance(standing sinkside x2 trials to wash hands after toileting tasks; Setup seated in chair to wash face)  LE Dressing: Supervision(seated EOB to doff/don slip on shoes x2 trials)  Toileting: Stand by assistance(for hygiene and clothing management x2 trials after bowel movement. Extended time provided with use of bathroom this AM x2 trials)        Bed mobility  Rolling to Left: Modified independent  Supine to Sit: Supervision(use of side rail)    Transfers  Sit to stand: Stand by assistance  Stand to sit: Stand by assistance  Transfer Comments: Verbal cueing provided throughout for proper hand placement during transfers  Toilet Transfers  Equipment Used: Raised toilet seat with rails  Toilet Transfer: Stand by assistance    Balance  Sitting Balance: Modified independent   Standing Balance: Stand by assistance         Functional Mobility  Functional - Mobility Device: Rolling Walker  Activity: To/from bathroom; Other(x2 trials to/from bathroom)  Assist Level: Stand by assistance(Close SBA)  Functional Mobility Comments: Approx 110 ft x1 trial within hallway at slow pace. No LOB noted. Min vcs to keep walker closer to self during mobility. Required seated rest break after mobility   Apparatus Needs: Wheelchair follow        Type of ROM/Therapeutic Exercise: AROM  Comment: Pt completed BUE strengthening exercises x15 reps x1 set this date with moderate resistance band in all joints and all planes in order to increase strength and improve activity tolerance required for functional toilet & shower transfers and ADL routine. Pt tolerated fair, requiring rest breaks throughout task. Activity Tolerance:  Activity Tolerance: Patient Tolerated treatment well  Activity Tolerance: Patient limited in therapy session this AM d/t urgent need to use bathroom to have bowel movements.  Patient requesting to complete therapy session within room after 2nd improved UB strength and activity tolerance needed for ease with funcitonal transfers and ADLs  Long term goals  Time Frame for Long term goals : 3-4 weeks  Long term goal 1: PT will complete ADL routine with no cues for safe and adapted tech with S for completion to increase independence in self care tasks  Long term goal 2: PT will complete 2 step homemaking tasks with S and no cues for problem solving or attention to task to be able to prepare a simple meal   If patient is discharged prior to progress note completion, this note is to serve as the discharge note with all goals being unmet unless indicated otherwise.

## 2019-06-10 NOTE — DISCHARGE INSTR - COC
Continuity of Care Form    Patient Name: Maria A Wolf   :  1933  MRN:  [id number]    Admit date:  2019  Discharge date:  2019    Code Status Order: Full Code   Advance Directives:   Advance Care Flowsheet Documentation     Date/Time Healthcare Directive Type of Healthcare Directive Copy in [address] Agent's Name Healthcare Agent's Phone Number    19 1234  Yes, patient has an advance directive for healthcare treatment  Durable power of  for health care  Yes, copy in chart  --  --  --    19 1138  No, patient does not have an advance directive for healthcare treatment  Other (Comment) none   Yes, copy in chart  --  --  --          Admitting Physician:  Loco Chahal MD  PCP: Loco Chahal MD    Discharging Nurse: [facility] Unit/Room#: 8E-71/071-A  Discharging Unit Phone Number: [phone]    Emergency Contact:   Extended Emergency Contact Information  Primary Emergency Contact: Jaye Jean [address] Phone: [phone]  Mobile Phone: [phone]  Relation: Niece/Nephew  Hearing or visual needs: None  Other needs: None  Preferred language: English   needed? No  Secondary Emergency Contact: Manuela Leary [address] Phone: [phone]  Mobile Phone: [phone]  Relation: Niece/Nephew  Hearing or visual needs: None  Other needs: None  Preferred language: English   needed?  No    Past Surgical History:  Past Surgical History:   Procedure Laterality Date    CARDIAC CATHETERIZATION      CATARACT REMOVAL  right ; left     CHOLECYSTECTOMY      COLONOSCOPY      COLOSTOMY  2018    REVERSAL OF COLOSTOMY    DILATION AND CURETTAGE OF UTERUS      ENDOSCOPY, COLON, DIAGNOSTIC      EYE SURGERY      EYE SURGERY Right 2017    Dr Emelyn Braden in [address]      partial    OTHER SURGICAL HISTORY  2016    robotic assisted laparoscopic anterior colon resection and end colostomy    VT OFFICE/OUTPT VISIT,PROCEDURE ONLY N/A 9/4/2018    COLOSTOMY REVERSAL AND PARASTOMAL HERNIA REPAIR performed by Harris Son MD at [address] PULMONARY [facility]  2002    UPPER GASTROINTESTINAL ENDOSCOPY  11/06       Immunization History:   Immunization History   Administered Date(s) Administered    Influenza Virus Vaccine 10/19/2011, 09/25/2012, 10/28/2014, 12/01/2015    Influenza, Caterina Liao, 3 Years and older, IM (Fluzone 3 yrs and older or Afluria 5 yrs and older) 10/31/2017, 12/11/2018    PPD Test 06/01/2016, 06/08/2016, 09/11/2018, 05/25/2019, 06/02/2019    Pneumococcal 13-valent Conjugate ([id number]) 12/01/2015    Pneumococcal Polysaccharide ([id number]) 11/19/2007       Active Problems:  Patient Active Problem List   Diagnosis Code    GERD (gastroesophageal reflux disease) K21.9    Carotid stenosis I65.29    Temporal arteritis ([facility] [address] M31.6    Hypercholesteremia E78.00    Hypothyroidism due to acquired atrophy of thyroid E03.4    Steroid-induced hyperglycemia R73.9, T38.0X5A    Blind right eye H54.40    Breast cancer metastasized to liver ([facility] [address] C50.919, C78.7    Current chronic use of systemic steroids Z79.52    Personal history of cardiac dysrhythmia Z86.79    Coronary artery disease involving native coronary artery of native heart without angina pectoris I25.10    Anxiety F41.9    H/O: CVA (cerebrovascular accident) Z80.78    Superior and Inferior Pubic ramus fracture, right, closed, initial encounter ([facility] [address] S32.591A    Lumbosacral spinal stenosis M48.07    On antineoplastic chemotherapy Z79.899       Isolation/Infection:   Isolation          No Isolation            Nurse Assessment:  Last Vital Signs: BP (!) 117/58   Pulse 61   Temp 97.8 °F (36.6 °C) (Oral)   Resp 13   Ht 5' 2\" (1.575 m)   Wt 154 lb 14.4 oz (70.3 kg)   LMP  (LMP Unknown)   SpO2 90%   BMI 28.33 kg/m²     Last documented pain score (0-10 scale): Pain Level: 10(right hip pelvic area used heating pad prior and following session )  Last Weight:   Wt Readings from Last 1 Encounters:   06/10/19 154 lb 14.4 oz (70.3 kg)     Mental Status: Alert and oriented    Nursing Mobility/ADLs:  Walking   Assisted  Transfer  Assisted  Bathing  Assisted  Dressing  Assisted  Toileting  Assisted  Feeding  Independent  Med Admin  Assisted  Med Delivery   whole    Wound Care Documentation and Therapy:  EPC to buttocks as needed for prevention      Elimination:  Continence:   · Bowel: Yes (occasional incontinence)  · Bladder: Yes     Date of Last BM: 06/10/19    Intake/Output Summary (Last 24 hours) at 6/10/2019 1559  Last data filed at 6/10/2019 0800  Gross per 24 hour   Intake 300 ml   Output --   Net 300 ml     I/O last 3 completed shifts: In: 300 [P.O.:300]  Out: -     Safety Concerns: At Risk for Falls, Left upper extremity limb alert,    Impairments/Disabilities:  Artificial Right Eye    Nutrition Therapy:  Current Nutrition Therapy:   - Oral Diet:  General    Routes of Feeding: Oral  Liquids: Thin Liquids  Daily Fluid Restriction: none  Last Modified Barium Swallow with Video (Video Swallowing Test): not done during this admission    Treatments at the Time of Hospital Discharge:   Respiratory Treatments: ipratropium-albuterol (DUONEB) nebulizer solution 1 ampule   [[id number]]   Order Details   Ordered Dose: 1 ampule Route: Inhalation Frequency: EVERY 4 HOURS PRN for Shortness of Breath     ipratropium-albuterol (DUONEB) nebulizer solution 1 ampule   [[id number]]   Order Details   Ordered Dose: 1 ampule Route: Inhalation Frequency: EVERY 6 HOURS WHILE AWAKE       Oxygen Therapy:  is not on home oxygen therapy. Rehab Therapies: OT, PT, ST  Weight Bearing Status/Restrictions: No weight bearing restirctions   Other Medical Equipment (for information only, NOT a DME order):   Daya Richmond, patient is bringing walker from home  Other Treatments: Giancarlo hose to be applied in

## 2019-06-10 NOTE — PLAN OF CARE
Problem: Impaired respiratory status  Goal: Clear lung sounds  Outcome: Ongoing  Note:   Continue therapy until clear breath sounds are obtained.

## 2019-06-10 NOTE — PLAN OF CARE
Problem: Impaired respiratory status  Goal: Clear lung sounds  6/10/2019 1805 by Ciro Adams RCP  Outcome: Met This Shift  Note:   Duoneb Q6 WA to maintain clear breath sounds.

## 2019-06-10 NOTE — PROGRESS NOTES
[facility] SK[facility] NURSING UN[facility] - 8E-71/071-A    Time In: 5330  Time Out: 1500  Timed Code Treatment Minutes: 39 Minutes  Minutes: 45          Date: 6/10/2019  Patient Name: Caro Iniguez,  Gender:  female        MRN: [id number]  : 1933  (80 y.o.)     Referring Practitioner: Inocencio Martinez, Ordering: Ana Walter MD  Diagnosis: superior and inferior pubic ramus fracture, right, initial encounter   Additional Pertinent Hx: The patient is a 80 y.o. female  With multiple medical comorbidities who was admitted to ortho services for treatment of a right pelvic fracture. Patient has a history of breast CA with mets to the liver and is currently undergoing weekly chemotherapy. She completed a treatment yesterday and notes that she usually gets dizzy afterwards. She reports a fall in her home after an episode of dizziness yesterday. She fell onto her right side landing on the right hip. She was unable to bear weight afterwards. She denies hitting her head and no LOC. X-ray shows a right superior and inferior pubic rami fracture. Patient lives alone and is typically independent. She does ambulate with a walker. CT negative for R femur fracture.      Past Medical History:   Diagnosis Date    Anxiety     Blind right eye     Breast cancer metastasized to liver ([address] 05/10/2016    Liver lesion noted     Carotid stenosis      Left ICA 25%    Colostomy in place [facility]) 2016    Degenerative arthritis of cervical spine     GERD (gastroesophageal reflux disease)     Hypercholesteremia     Hypoestrogenism     Hypothyroidism     Lumbago     Lumbosacral spinal stenosis 2019    MDRO (multiple drug resistant organisms) resistance     pt stated cleared    Personal history of cardiac dysrhythmia     Sigmoid diverticulosis     Spondylolisthesis of lumbosacral region     Steroid-induced hyperglycemia     Subclavian artery stenosis ([facility] [address]     left    Superior and Inferior Pubic ramus fracture, right, closed, initial encounter ([facility] [address] 05/21/2019    SVT (supraventricular tachycardia) ([facility] [address] 6/07    Temporal arteritis ([facility] [address] [facility]     Vertebral artery occlusion     left    Vitamin D deficiency 06/2017     Past Surgical History:   Procedure Laterality Date    CARDIAC CATHETERIZATION  5/07    CATARACT REMOVAL  right 1/08; left 2/08    CHOLECYSTECTOMY  1/03    COLONOSCOPY  11/06    COLOSTOMY  09/04/2018    REVERSAL OF COLOSTOMY    DILATION AND CURETTAGE OF UTERUS      ENDOSCOPY, COLON, DIAGNOSTIC      EYE SURGERY      EYE SURGERY Right 11/06/2017    Dr Lopez in [address]      partial    OTHER SURGICAL HISTORY  05/27/2016    robotic assisted laparoscopic anterior colon resection and end colostomy    ND OFFICE/OUTPT VISIT,PROCEDURE ONLY N/A 9/4/2018    COLOSTOMY REVERSAL AND PARASTOMAL HERNIA REPAIR performed by Jovanny Mckeon MD at [address] PULMONARY [facility]  2002    UPPER GASTROINTESTINAL ENDOSCOPY  11/06       Restrictions/Precautions:  Weight Bearing, Fall Risk     Right Lower Extremity Weight Bearing: Weight Bearing As Tolerated                      Prior Level of Function:  ADL Assistance: Needs assistance(HH aide assists pt 5 days a week x 2 hours.)  Homemaking Assistance: Needs assistance(aide assists with housekeeping and grocery shopping.)  Ambulation Assistance: Independent  Transfer Assistance: Independent  Additional Comments: Pt states amb with RW, O2 at night; has aide services 5 days/week that assists with laundry, showers and grocery shopping.     Subjective:     Subjective: pt resting in bed on arrival reported pain 10/10 at right hip groin area and had pain meds already, pt using heating pad for pain and reported that it was helping, pt asked to stay close to room for therapy due to needing to use bathroom frequently today     Pain:   .  Pain Assessment  Pain Level: 10(right hip pelvic area used heating pad prior and following session )       Social/Functional:  Lives With: Alone  Type of Home: House  Home Layout: One level  Home Access: Level entry  Home Equipment: Rolling walker, Oxygen     Objective:  Rolling to Left: Supervision(pt did reach for rail )  Supine to Sit: Stand by assistance(pt had min difficulty lowering right LE off edge of bed and supervision to scoot to edge of bed )  Sit to Supine: Stand by assistance(pt used her gait belt to assist right LE up into bed due to pain when she was trying to lift it up )    Transfers  Sit to Stand: Contact guard assistance(to SBA pt had increased pain this date )  Stand to sit: Stand by assistance       Ambulation 1  Device: [address]: Stand by assistance(to CGA)  Quality of Gait: pt required several cues for improved gait pattern, pt needed cues to stand erect as she leans heavily on the walker, noted decreased stance at right LE but lacking TKE at stance phase w/ decreased step length   Distance: 70x2  Comments: followed w/ w/c         Balance  Comments: dyanmic balance activity w/ one UE at support pt reaching above head adn to knee ht and ~ 2 in out of base of support w/ SBA noted most of wbing at left LE        Exercises:  Exercises  Comments: pt completed supine ankle pumps, quad sets, heelslides, hip abd/add, short arc qauds, seated long arc qauds, hip flexion x 10 reps each         Activity Tolerance:  Activity Tolerance: Patient limited by pain; Patient limited by fatigue    Assessment:   Body structures, Functions, Activity limitations: Decreased functional mobility , Decreased strength, Decreased safe awareness, Decreased balance, Decreased endurance, Increased Pain  Assessment: pt c/o of pain 10/10 during session this date which limited her she would benefit from cont skilled therapy   Prognosis: Good          Discharge Recommendations:  Discharge Recommendations: ECF with PT    Patient Education:  Patient Education: robbin transfers, bed mobility, gait    Equipment Recommendations:  Equipment Needed: No(Pt has RW, 0SU)    Safety:  Type of devices: All fall risk precautions in place, Patient at risk for falls, Call light within reach, Left in chair, Chair alarm in place, Gait belt    Plan:  Times per week: 6x/wk  Times per day: Daily  Current Treatment Recommendations: Strengthening, Functional Mobility Training, Transfer Training, Balance Training, Gait Training, Stair training, Home Exercise Program, Equipment Evaluation, Education, & procurement, Safety Education & Training, Patient/Caregiver Education & Training, Endurance Training    Goals:  Patient goals : to get stronger    Short term goals  Time Frame for Short term goals: 1 week  Short term goal 1: Pt to transfer supine <--> sit MINx1 to enable pt to get in/out of bed. MET see LTG  Short term goal 2: Pt to transfer sit <--> stand CGA for increased functional mobility. Short term goal 3: Pt to ambulate >15 feet for household ambulation with use of RW and CGA  Short term goal 4: Pt to get in/out of car, CGA, for transportation needs. Short term goal 5: Pt to ascend/descend 6\" step with rw, CGa, for community mobility    Long term goals  Time Frame for Long term goals : 2 weeks  Long term goal 1: Patient will be able to perform bed mobility CGA to get in/out of bed  Long term goal 2: Patient will be able to perform functional transfers SBA to sit in bedside chair  Long term goal 3: Patient will be able to ambulate up to 50 feet with RW and CGA to walk around home safely  Long term goal 4: Pt to get in/out of car, SBA, for transportation needs. Long term goal 5: Pt to ascend/descend 6\" step with RW, SBA, for community mobility  If patient is discharged prior to progress note completion, this note is to serve as the discharge note with all goals being unmet unless indicated otherwise.

## 2019-06-10 NOTE — PROGRESS NOTES
Call made to Dr Abelino Jang office to confirm appointment for tomorrow. Office did not have her down for tomorrow and seen a note that stated that family cancelled this appointment and will reschedule it once she is discharged and in the nursing home.

## 2019-06-11 LAB — GLUCOSE BLD-MCNC: 94 MG/DL (ref 70–108)

## 2019-06-11 PROCEDURE — 97116 GAIT TRAINING THERAPY: CPT

## 2019-06-11 PROCEDURE — 97530 THERAPEUTIC ACTIVITIES: CPT

## 2019-06-11 PROCEDURE — 97535 SELF CARE MNGMENT TRAINING: CPT

## 2019-06-11 PROCEDURE — 6370000000 HC RX 637 (ALT 250 FOR IP): Performed by: EMERGENCY MEDICINE

## 2019-06-11 PROCEDURE — 82948 REAGENT STRIP/BLOOD GLUCOSE: CPT

## 2019-06-11 PROCEDURE — 1290000000 HC SEMI PRIVATE OTHER R&B

## 2019-06-11 PROCEDURE — 6370000000 HC RX 637 (ALT 250 FOR IP): Performed by: ORTHOPAEDIC SURGERY

## 2019-06-11 PROCEDURE — 94640 AIRWAY INHALATION TREATMENT: CPT

## 2019-06-11 PROCEDURE — 94760 N-INVAS EAR/PLS OXIMETRY 1: CPT

## 2019-06-11 RX ADMIN — CETIRIZINE HYDROCHLORIDE 5 MG: 10 TABLET, FILM COATED ORAL at 08:47

## 2019-06-11 RX ADMIN — ISOSORBIDE MONONITRATE 30 MG: 30 TABLET ORAL at 08:47

## 2019-06-11 RX ADMIN — IPRATROPIUM BROMIDE AND ALBUTEROL SULFATE 1 AMPULE: .5; 3 SOLUTION RESPIRATORY (INHALATION) at 12:15

## 2019-06-11 RX ADMIN — SERTRALINE 50 MG: 50 TABLET, FILM COATED ORAL at 08:47

## 2019-06-11 RX ADMIN — IPRATROPIUM BROMIDE AND ALBUTEROL SULFATE 1 AMPULE: .5; 3 SOLUTION RESPIRATORY (INHALATION) at 16:53

## 2019-06-11 RX ADMIN — SERTRALINE 50 MG: 50 TABLET, FILM COATED ORAL at 21:30

## 2019-06-11 RX ADMIN — HYDROCODONE BITARTRATE AND ACETAMINOPHEN 2 TABLET: 5; 325 TABLET ORAL at 06:20

## 2019-06-11 RX ADMIN — PREDNISONE 2 MG: 1 TABLET ORAL at 08:47

## 2019-06-11 RX ADMIN — CLOPIDOGREL BISULFATE 75 MG: 75 TABLET ORAL at 08:47

## 2019-06-11 RX ADMIN — METOPROLOL TARTRATE 12.5 MG: 25 TABLET ORAL at 21:30

## 2019-06-11 RX ADMIN — SIMVASTATIN 20 MG: 20 TABLET, FILM COATED ORAL at 21:30

## 2019-06-11 RX ADMIN — HYDROCODONE BITARTRATE AND ACETAMINOPHEN 2 TABLET: 5; 325 TABLET ORAL at 14:45

## 2019-06-11 RX ADMIN — METOPROLOL TARTRATE 12.5 MG: 25 TABLET ORAL at 08:46

## 2019-06-11 RX ADMIN — MULTIPLE VITAMINS W/ MINERALS TAB 1 TABLET: TAB at 08:47

## 2019-06-11 RX ADMIN — PANTOPRAZOLE SODIUM 40 MG: 40 TABLET, DELAYED RELEASE ORAL at 17:59

## 2019-06-11 RX ADMIN — ACETAMINOPHEN 650 MG: 325 TABLET ORAL at 17:59

## 2019-06-11 RX ADMIN — IPRATROPIUM BROMIDE AND ALBUTEROL SULFATE 1 AMPULE: .5; 3 SOLUTION RESPIRATORY (INHALATION) at 06:06

## 2019-06-11 RX ADMIN — LEVOTHYROXINE SODIUM 88 MCG: 88 TABLET ORAL at 06:20

## 2019-06-11 RX ADMIN — PANTOPRAZOLE SODIUM 40 MG: 40 TABLET, DELAYED RELEASE ORAL at 06:20

## 2019-06-11 RX ADMIN — ASPIRIN 325 MG: 325 TABLET, DELAYED RELEASE ORAL at 08:47

## 2019-06-11 ASSESSMENT — PAIN SCALES - GENERAL
PAINLEVEL_OUTOF10: 2
PAINLEVEL_OUTOF10: 6
PAINLEVEL_OUTOF10: 7
PAINLEVEL_OUTOF10: 7
PAINLEVEL_OUTOF10: 0
PAINLEVEL_OUTOF10: 10

## 2019-06-11 ASSESSMENT — PAIN - FUNCTIONAL ASSESSMENT: PAIN_FUNCTIONAL_ASSESSMENT: ACTIVITIES ARE NOT PREVENTED

## 2019-06-11 NOTE — CARE COORDINATION
[facility]  [address]      Date: 2019  Patient Name: Shima Perez,  Gender:  female        MRN: [id number]  : 1933  (80 y.o.)     Referring Practitioner: Fanny Sy, Ordering: Jose Vargas MD  Diagnosis: superior and inferior pubic ramus fracture, right, initial encounter   Additional Pertinent Hx: The patient is a 80 y.o. female  With multiple medical comorbidities who was admitted to ortho services for treatment of a right pelvic fracture. Patient has a history of breast CA with mets to the liver and is currently undergoing weekly chemotherapy. She completed a treatment yesterday and notes that she usually gets dizzy afterwards. She reports a fall in her home after an episode of dizziness yesterday. She fell onto her right side landing on the right hip. She was unable to bear weight afterwards. She denies hitting her head and no LOC. X-ray shows a right superior and inferior pubic rami fracture. Patient lives alone and is typically independent. She does ambulate with a walker. CT negative for R femur fracture. Restrictions/Precautions:  Restrictions/Precautions: Weight Bearing, Fall Risk    Right Lower Extremity Weight Bearing: Weight Bearing As Tolerated                 Social/Functional:  Lives With: Alone  Type of Home: House  Home Layout: One level  Home Access: Level entry  Home Equipment: Rolling walker, Oxygen   Assessment:  Pt has made steady progress, meeting all of her short and long term goals. She is limited yet with overall endurance and minimal safety. Pt will benefit from 24/7 assist and f/u PT for continued progression with balance, gait and overall endurance.      Equipment Recommendations:  Equipment Needed: No(Pt has RW, [id number])    Plan:  Discharge to ECF with PT to f/u    Goals:  Short term goals  MET  Time Frame for Short term goals: 1 week  Short term goal 1: Pt to transfer supine <--> sit MINx1 to enable pt to get in/out of bed. MET see LTG  Short term goal 2: Pt to transfer sit <--> stand CGA for increased functional mobility. MET   Short term goal 3: Pt to ambulate >15 feet for household ambulation with use of RW and CGA  MET  Short term goal 4: Pt to get in/out of car, CGA, for transportation needs. MET  Short term goal 5: Pt to ascend/descend 6\" step with rw, CGA, for community mobility   MET    Long term goals  MET  Time Frame for Long term goals : 2 weeks  Long term goal 1: Patient will be able to perform bed mobility CGA to get in/out of bed  Long term goal 2: Patient will be able to perform functional transfers SBA to sit in bedside chair  Long term goal 3: Patient will be able to ambulate up to 50 feet with RW and CGA to walk around home safely  Long term goal 4: Pt to get in/out of car, SBA, for transportation needs.    Long term goal 5: Pt to ascend/descend 6\" step with RW, SBA, for community mobility

## 2019-06-11 NOTE — PROGRESS NOTES
Call made to Dr Huerta's office to notify of pending discharge that will need completed for tomorrow.

## 2019-06-11 NOTE — PROGRESS NOTES
Family Medicine Progress Notes/Coverage    Today's Date: 6/11/19  8E-71/071-A  Medical Record # [id number]  Account # [de-identified]      Ms. Rod admitted on 5/24/2019        Subjective / Interval History :     Still with lump right lateral hip  Reviewed notes, consults, laboratory and radiology results,    Objective:       Physical Exam:  Patient Vitals for the past 24 hrs:   BP Temp Temp src Pulse Resp SpO2   06/11/19 0606 -- -- -- -- 16 92 %   06/10/19 2220 (!) 153/94 98.1 °F (36.7 °C) Oral 64 14 93 %   06/10/19 0826 (!) 117/58 97.8 °F (36.6 °C) Oral 61 13 90 %       General Appearance:  Alert, cooperative, no distress, appears stated age   HEENT:  Neck:      Chest/Lungs:  Heart: CTA  RRR   Abdomen:  Back:    Extremities:  Neurological Exam: + hematoma right lateral hip  WNL       Assessment:     Admitting Diagnosis:    right pelvic fracture    Active Hospital Problems    Diagnosis Date Noted    Superior and Inferior Pubic ramus fracture, right, closed, initial encounter ([facility] [address] Leanna Walker 05/21/2019     Priority: High    Anxiety [F41.9] 06/29/2016     Priority: High    Breast cancer metastasized to liver ([facility] [address] [C50.919, C78.7] 06/08/2016     Priority: High    On antineoplastic chemotherapy [Z79.899] 05/25/2019     Priority: Medium    Lumbosacral spinal stenosis [M48.07] 05/01/2019     Priority: Medium    Personal history of cardiac dysrhythmia [Z86.79]      Priority: Medium    Current chronic use of systemic steroids [Z79.52] 09/04/2018     Priority: Medium    H/O: CVA (cerebrovascular accident) [Z86.73] 11/06/2017     Priority: Medium    Coronary artery disease involving native coronary artery of native heart without angina pectoris [I25.10] 11/06/2017     Priority: Medium    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]      Priority: Medium    Hypothyroidism due to acquired atrophy of thyroid [E03.4] 03/03/2016     Priority: Medium    Hypercholesteremia [E78.00]      Priority: Medium    Temporal arteritis ([address] [M31.6]      Priority: Medium    GERD (gastroesophageal reflux disease) [K21.9]      Priority: Medium       Plan:     Discussed plans with the nursing staff  To F in AM    Medications, Laboratories and Imaging results:    Scheduled Meds:   polyethylene glycol  17 g Oral Daily    pantoprazole  40 mg Oral BID AC    ipratropium-albuterol  1 ampule Inhalation Q6H WA    clopidogrel  75 mg Oral Daily    aspirin  325 mg Oral Daily    cetirizine  5 mg Oral Daily    isosorbide mononitrate  30 mg Oral Daily    levothyroxine  88 mcg Oral Daily    metoprolol tartrate  12.5 mg Oral BID    predniSONE  2 mg Oral Daily    sertraline  50 mg Oral BID    simvastatin  20 mg Oral Nightly    therapeutic multivitamin-minerals  1 tablet Oral Daily     Continuous Infusions:  PRN Meds:ipratropium-albuterol, acetaminophen, HYDROcodone 5 mg - acetaminophen **OR** HYDROcodone 5 mg - acetaminophen, magnesium hydroxide, ALPRAZolam    Imaging:    Lab Review :    Lab Results   Component Value Date    WBC 9.0 06/03/2019    HGB 9.9 (L) 06/03/2019    HCT 32.9 (L) 06/03/2019    MCV 91.9 06/03/2019     06/03/2019     Lab Results   Component Value Date    CREATININE 0.8 06/03/2019    BUN 21 06/03/2019     06/03/2019    K 4.3 06/03/2019     06/03/2019    CO2 26 06/03/2019     Lab Results   Component Value Date    CKTOTAL 41 10/16/2016     Lab Results   Component Value Date    ALT 15 12/22/2018    AST 14 12/22/2018    ALKPHOS 53 12/22/2018    BILITOT 0.6 12/22/2018     Lab Results   Component Value Date    LIPASE 11.4 12/22/2018         Electronically signed by Tone Welsh MD on 6/11/19 at 7:08 Shameka Barclay M.D.

## 2019-06-11 NOTE — DISCHARGE INSTR - DIET
 Good nutrition is important when healing from an illness, injury, or surgery. Follow any nutrition recommendations given to you during your hospital stay.  If you were given an oral nutrition supplement while in the hospital, continue to take this supplement at home. You can take it with meals, in-between meals, and/or before bedtime. These supplements can be purchased at most local grocery stores, pharmacies, and chain super-stores.  If you have any questions about your diet or nutrition, call the hospital and ask for the dietitian. General Diet  · Get some physical activity every day, but do not get too tired.

## 2019-06-11 NOTE — DISCHARGE INSTR - ACTIVITY
Weight Bearing as Tolerated  · Rest when you feel tired. · Get some physical activity every day, but do not get too tired.

## 2019-06-11 NOTE — PROGRESS NOTES
and don tshirt in seated position on RTS  Lower Extremity Dressing: Minimal Assistance. To doff/don socks seated on RTS. Able to thread BLE into pants/undergarment seated on RTS. Pulled up over hips while standing with SBA    Toileting: Stand By Assistance. For hygiene and clothing management  Toilet Transfer: Stand By Assistance. To/from RTS. BALANCE:  Sitting Balance:  Modified Independent  Standing Balance: Stand By Assistance    BED MOBILITY:  Sit to Supine: Stand By Assistance Bringing BLE into bed    TRANSFERS:  Sit to Stand:  Stand By Assistance. From EOB, RTS  Stand to Sit: Stand By Assistance. FUNCTIONAL MOBILITY:  Assistive Device: Rolling Walker  Assist Level:  Stand By Assistance. (Close SBA)  To/from bathroom at slow pace. No LOB noted     ASSESSMENT:  Activity Tolerance:  Patient tolerance of  treatment: fair. Patient having an increase of pain in R hip and lower back this PM.       Discharge Recommendations: ECF with OT  Equipment Recommendations: Other: Refer equipment need to next level of care, recommending shower chair, LHAE, BSC.   Plan: Times per week: 6x  Current Treatment Recommendations: Strengthening, Balance Training, Endurance Training, Functional Mobility Training, Patient/Caregiver Education & Training, Self-Care / ADL, Safety Education & Training, Home Management Training, Equipment Evaluation, Education, & procurement    Patient Education  Patient Education: Adaptive ADL Techniques    Goals  Short term goals  Time Frame for Short term goals: 1 week  Short term goal 1: Pt will complete ADL routine with no cues for adapted tech and LHAE PRN to increase independende in self care tasks   Short term goal 2: Pt will complete functional ambulation to and from the BR with SBA and no cues for RW safety to increase independence in toileting routine  Short term goal 3: Pt will complete multi step light homemaking tasks with SBA and no > min cues for attention to task to increase independence in clothng retrieval.   Short term goal 4: Pt will complete dynamic standing task with B UE release for 8 minutes with SBA and no > 1 vc to attend to task to increase ease with sink side grooming  Short term goal 5: Pt will demo indep wtih BUE light strenthenng HEP  for improved UB strength and activity tolerance needed for ease with funcitonal transfers and ADLs  Long term goals  Time Frame for Long term goals : 3-4 weeks  Long term goal 1: PT will complete ADL routine with no cues for safe and adapted tech with S for completion to increase independence in self care tasks  Long term goal 2: PT will complete 2 step homemaking tasks with S and no cues for problem solving or attention to task to be able to prepare a simple meal     Following session, patient left in safe position with all fall risk precautions in place.

## 2019-06-11 NOTE — PLAN OF CARE
Problem: Discharge Planning:  Intervention: Involve patient/S. O. in Discharge Planning process  Note:   The patient is discharging from TCU tomorrow, 6/12. [facility] and therapies. The TCU team recommended a 24 hr care setting due to cognitive and safety concerns, and this degree of help was unable to be lined up at her home The [employer] worker was contacted, as well as patient's niece, Rosemarie Vega, and there was consensus that patient would need a nursing home placement. The patient was agreeable to this plan once the rationale was explained, although she is hopeful that she will be able to return home in the future. Will arrange transportation to [facility] hospital tomorrow.  MIGEL Chandra

## 2019-06-11 NOTE — PLAN OF CARE
Problem: Falls - Risk of:  Goal: Will remain free from falls  Description      Outcome: Ongoing   No falls sustained at this time. Problem: Pain:  Goal: Control of acute pain  Description      Outcome: Ongoing   C/o intermittent right hip pain. PRN norco and heat pad in use  Problem: Anxiety:  Goal: Level of anxiety will decrease  Description      Outcome: Ongoing   No episodes of anxiety noted, calm and cooperative with staff  Problem: Bleeding:  Goal: Will show no signs and symptoms of excessive bleeding  Description      Outcome: Ongoing   No s/s excessive bleeding  Problem: Serum Glucose Level - Abnormal:  Goal: Ability to maintain appropriate glucose levels will improve  Description      Outcome: Ongoing   AM chem, blood sugar stable  Problem: IP MOBILITY  Goal: LTG - patient will demonstrate safe mobility requirements  Outcome: Ongoing   1 assist with walker  Problem: Discharge Planning:  Goal: Patients continuum of care needs are met  Description      Outcome: Ongoing   Plan to discharge to [facility] tomorrow   Problem: Urinary Elimination:  Goal: Skin integrity will improve  Description      Outcome: Ongoing   Continent of bladder, wears pullups.

## 2019-06-11 NOTE — PROGRESS NOTES
[facility]  SPEECH THERAPY MISSED TREATMENT NOTE  - [place] [facility]      Date: 2019  Patient Name: Zofia Rodriguez        MRN: [id number]    : 1933  (80 y.o.)    REASON FOR MISSED TREATMENT:  Attempted to see patient x2 this date. First attempt schedule time of 11:45; upon attempt patient finishing signing living will with RN and Father John Blevins. Following completion of living will patient stating \"I just have a lot on my mind and I can't even think right now. Can we do this later? \"  ST rescheduled for 1430 this date. Second attempt scheduled time of 1430; upon attempt patient awake in bed stating \"I just want to lay here I and think about what I going on. I don't want to do anymore therapy. \"  Will hold this date and plan to see next date for final discharge session as patient is anticipated to discharge to [facility] tomorrow     TENNILLE Ravi 23

## 2019-06-11 NOTE — PROGRESS NOTES
[facility]  INPATIENT PHYSICAL THERAPY  DAILY NOTE  - [place] [facility] - [id number]    Time In: 0945  Time Out: 1030  Timed Code Treatment Minutes: 39 Minutes  Minutes: 45          Date: 2019  Patient Name: Jason Coulter,  Gender:  female        MRN: [id number]  : 1933  (80 y.o.)     Referring Practitioner: Jimmie Ramirez, Ordering: Heike Rodgers MD  Diagnosis: superior and inferior pubic ramus fracture, right, initial encounter   Additional Pertinent Hx: The patient is a 80 y.o. female  With multiple medical comorbidities who was admitted to ortho services for treatment of a right pelvic fracture. Patient has a history of breast CA with mets to the liver and is currently undergoing weekly chemotherapy. She completed a treatment yesterday and notes that she usually gets dizzy afterwards. She reports a fall in her home after an episode of dizziness yesterday. She fell onto her right side landing on the right hip. She was unable to bear weight afterwards. She denies hitting her head and no LOC. X-ray shows a right superior and inferior pubic rami fracture. Patient lives alone and is typically independent. She does ambulate with a walker. CT negative for R femur fracture.      Past Medical History:   Diagnosis Date    Anxiety     Blind right eye     Breast cancer metastasized to liver ([address] 05/10/2016    Liver lesion noted     Carotid stenosis      Left ICA 25%    Colostomy in place [facility]) 2016    Degenerative arthritis of cervical spine     GERD (gastroesophageal reflux disease)     Hypercholesteremia     Hypoestrogenism     Hypothyroidism     Lumbago     Lumbosacral spinal stenosis 2019    MDRO (multiple drug resistant organisms) resistance     pt stated cleared    Personal history of cardiac dysrhythmia     Sigmoid diverticulosis     Spondylolisthesis of lumbosacral region     Steroid-induced hyperglycemia     [facility]cl[facility] artery stenosis [facility]     left    Superior and Inferior Pubic ramus fracture, right, closed, initial encounter ([facility] 05/21/2019    SVT (supraventricular tachycardia) ([facility] 6/07    Temporal arteritis ([facility]     Vertebral artery occlusion     left    Vitamin D deficiency 06/2017     Past Surgical History:   Procedure Laterality Date    CARDIAC CATHETERIZATION  5/07    CATARACT REMOVAL  right 1/08; left 2/08    CHOLECYSTECTOMY  1/03    COLONOSCOPY  11/06    COLOSTOMY  09/04/2018    REVERSAL OF COLOSTOMY    DILATION AND CURETTAGE OF UTERUS      ENDOSCOPY, COLON, DIAGNOSTIC      EYE SURGERY      [facility]06/2017     [facility] [place] in [address]      partial    OTHER SURGICAL HISTORY  05/27/2016    robotic assisted laparoscopic anterior colon resection and end colostomy    NE OFFICE/OUTPT VISIT,PROCEDURE ONLY N/A 9/4/2018    COLOSTOMY REVERSAL AND PARASTOMAL HERNIA REPAIR performed by Nancy Abbott MD at [address] [facility]  2002    UPPER GASTROINTESTINAL ENDOSCOPY  11/06       Restrictions/Precautions:  Weight Bearing, Fall Risk     Right Lower Extremity Weight Bearing: Weight Bearing As Tolerated          Prior Level of Function:  ADL Assistance: Needs assistance(HH aide assists pt 5 days a week x 2 hours.)  Homemaking Assistance: Needs assistance(aide assists with housekeeping and grocery shopping.)  Ambulation Assistance: Independent  Transfer Assistance: Independent  Additional Comments: Pt states amb with RW, O2 at night; has aide services 5 days/week that assists with laundry, showers and grocery shopping. Subjective:     Subjective: Patient sitting in recliner upon arrival. Patient agreeable to therapy. Pain:  Yes.   Pain Assessment  Pain Assessment: 0-10  Pain Level: 7(R hip)       Social/Functional:  Lives With: Alone  Type of Home: House  Home Layout: One level  Home Access: Level entry  Home Equipment: Rolling walker, Oxygen     Objective:  Rolling to Left: Supervision(mat)  Rolling to Right: Supervision(mat)  Supine to Sit: Stand by assistance(mat)  Sit to Supine: Supervision(mat)  Scooting: Supervision    Transfers  Sit to Stand: Stand by assistance(occasional cues for hand placement)  Stand to sit: Stand by assistance(occasional cues for hand placement)  Bed to Chair: Supervision(used RW)  Car Transfer: Modified independent(patient self assists BLE)       Ambulation 1  Surface: level tile;ramp  Device: Rolling Walker  Assistance: Supervision  Quality of Gait: decreased sivan, decreased step length/height, slight antalgic gait, forward flexed posture, decreased stance on R  Distance: level: 35ft x1, 40ft x1, 12ft x1, 20ft x1, 75ft x1, 30ft x1, 250ft x1, 10ftx1,   ramp: 20ft x1    Stairs  # Steps : (4+12)  Stairs Height: 6\"  Rails: Bilateral  Curbs: 6\"  Device: Rolling walker  Assistance: Stand by assistance  Comment: verbal and tactile cues for sequencing on stairs, completed 4 stairs and additional 12 stairs,    verbal cues for sequencing on porch step    Balance  Comments: dynamic standing balance: patient stood at [employer] and used reacher to  object from floor level, Modified Independent         Exercises:  Exercises  Comments: None this session         Activity Tolerance:  Activity Tolerance: Patient Tolerated treatment well;Patient limited by fatigue;Patient limited by pain    Assessment: Body structures, Functions, Activity limitations: Decreased functional mobility , Decreased strength, Decreased safe awareness, Decreased balance, Decreased endurance, Increased Pain  Assessment: Patient tolerated session well. Patient motivated for therapy. Patient able to increase ambulation distance this session. Patient would benefit from continued skilled physical therapy to improve functional mobility.    Prognosis: Good          Discharge Recommendations:  Discharge Recommendations: ECF with PT    Patient Education:  Patient Education: transfers, bed mobility, gait, stair training, curb training, car transfer    Equipment Recommendations:  Equipment Needed: No(Pt has RW, 8QP)    Safety:  Type of devices: All fall risk precautions in place, Call light within reach, Bed alarm in place, Gait belt, Patient at risk for falls, Left in bed    Plan:  Times per week: 6x/wk  Times per day: Daily  Current Treatment Recommendations: Strengthening, Functional Mobility Training, Transfer Training, Balance Training, Gait Training, Stair training, Home Exercise Program, Equipment Evaluation, Education, & procurement, Safety Education & Training, Patient/Caregiver Education & Training, Endurance Training    Goals:  Patient goals : to get stronger    Short term goals  Time Frame for Short term goals: 1 week  Short term goal 1: Pt to transfer supine <--> sit MINx1 to enable pt to get in/out of bed. MET see LTG  Short term goal 2: Pt to transfer sit <--> stand CGA for increased functional mobility. MET   Short term goal 3: Pt to ambulate >15 feet for household ambulation with use of RW and CGA  MET  Short term goal 4: Pt to get in/out of car, CGA, for transportation needs. MET  Short term goal 5: Pt to ascend/descend 6\" step with rw, CGA, for community mobility   MET    Long term goals  Time Frame for Long term goals : 2 weeks  Long term goal 1: Patient will be able to perform bed mobility CGA to get in/out of bed  Long term goal 2: Patient will be able to perform functional transfers SBA to sit in bedside chair  Long term goal 3: Patient will be able to ambulate up to 50 feet with RW and CGA to walk around home safely  Long term goal 4: Pt to get in/out of car, SBA, for transportation needs. Long term goal 5: Pt to ascend/descend 6\" step with RW, SBA, for community mobility  If patient is discharged prior to progress note completion, this note is to serve as the discharge note with all goals being unmet unless indicated otherwise.

## 2019-06-12 VITALS
SYSTOLIC BLOOD PRESSURE: 126 MMHG | RESPIRATION RATE: 16 BRPM | OXYGEN SATURATION: 93 % | TEMPERATURE: 97.8 F | HEART RATE: 67 BPM | HEIGHT: 62 IN | BODY MASS INDEX: 28.5 KG/M2 | DIASTOLIC BLOOD PRESSURE: 61 MMHG | WEIGHT: 154.9 LBS

## 2019-06-12 LAB — GLUCOSE BLD-MCNC: 89 MG/DL (ref 70–108)

## 2019-06-12 PROCEDURE — 97127 HC SP THER IVNTJ W/FOCUS COG FUNCJ: CPT

## 2019-06-12 PROCEDURE — 82948 REAGENT STRIP/BLOOD GLUCOSE: CPT

## 2019-06-12 PROCEDURE — 94640 AIRWAY INHALATION TREATMENT: CPT

## 2019-06-12 PROCEDURE — 6370000000 HC RX 637 (ALT 250 FOR IP): Performed by: ORTHOPAEDIC SURGERY

## 2019-06-12 PROCEDURE — 6370000000 HC RX 637 (ALT 250 FOR IP): Performed by: EMERGENCY MEDICINE

## 2019-06-12 RX ORDER — HYDROCODONE BITARTRATE AND ACETAMINOPHEN 5; 325 MG/1; MG/1
1 TABLET ORAL EVERY 6 HOURS PRN
Qty: 20 TABLET | Refills: 0 | Status: SHIPPED | OUTPATIENT
Start: 2019-06-12 | End: 2019-06-17

## 2019-06-12 RX ORDER — IPRATROPIUM BROMIDE AND ALBUTEROL SULFATE 2.5; .5 MG/3ML; MG/3ML
3 SOLUTION RESPIRATORY (INHALATION)
Qty: 30 VIAL | Refills: 0 | Status: SHIPPED | OUTPATIENT
Start: 2019-06-12 | End: 2021-03-09

## 2019-06-12 RX ORDER — HYDROCODONE BITARTRATE AND ACETAMINOPHEN 5; 325 MG/1; MG/1
1 TABLET ORAL EVERY 6 HOURS PRN
Qty: 20 TABLET | Refills: 0 | Status: SHIPPED | OUTPATIENT
Start: 2019-06-12 | End: 2019-06-12

## 2019-06-12 RX ORDER — IPRATROPIUM BROMIDE AND ALBUTEROL SULFATE 2.5; .5 MG/3ML; MG/3ML
3 SOLUTION RESPIRATORY (INHALATION)
Qty: 30 VIAL | Refills: 0 | Status: SHIPPED | OUTPATIENT
Start: 2019-06-12 | End: 2019-06-12

## 2019-06-12 RX ORDER — POLYETHYLENE GLYCOL 3350 17 G/17G
17 POWDER, FOR SOLUTION ORAL DAILY
Qty: 527 G | Refills: 1 | COMMUNITY
Start: 2019-06-13 | End: 2019-07-13

## 2019-06-12 RX ORDER — LORATADINE 10 MG/1
TABLET ORAL
Qty: 90 TABLET | Refills: 1 | COMMUNITY
Start: 2019-06-12 | End: 2020-01-02 | Stop reason: SDUPTHER

## 2019-06-12 RX ADMIN — HYDROCODONE BITARTRATE AND ACETAMINOPHEN 2 TABLET: 5; 325 TABLET ORAL at 14:08

## 2019-06-12 RX ADMIN — HYDROCODONE BITARTRATE AND ACETAMINOPHEN 2 TABLET: 5; 325 TABLET ORAL at 09:22

## 2019-06-12 RX ADMIN — PREDNISONE 2 MG: 1 TABLET ORAL at 09:25

## 2019-06-12 RX ADMIN — MULTIPLE VITAMINS W/ MINERALS TAB 1 TABLET: TAB at 09:21

## 2019-06-12 RX ADMIN — LEVOTHYROXINE SODIUM 88 MCG: 88 TABLET ORAL at 05:11

## 2019-06-12 RX ADMIN — PANTOPRAZOLE SODIUM 40 MG: 40 TABLET, DELAYED RELEASE ORAL at 05:11

## 2019-06-12 RX ADMIN — IPRATROPIUM BROMIDE AND ALBUTEROL SULFATE 1 AMPULE: .5; 3 SOLUTION RESPIRATORY (INHALATION) at 06:03

## 2019-06-12 RX ADMIN — IPRATROPIUM BROMIDE AND ALBUTEROL SULFATE 1 AMPULE: .5; 3 SOLUTION RESPIRATORY (INHALATION) at 13:51

## 2019-06-12 RX ADMIN — ISOSORBIDE MONONITRATE 30 MG: 30 TABLET ORAL at 09:22

## 2019-06-12 RX ADMIN — CLOPIDOGREL BISULFATE 75 MG: 75 TABLET ORAL at 09:23

## 2019-06-12 RX ADMIN — HYDROCODONE BITARTRATE AND ACETAMINOPHEN 2 TABLET: 5; 325 TABLET ORAL at 02:09

## 2019-06-12 RX ADMIN — ASPIRIN 325 MG: 325 TABLET, DELAYED RELEASE ORAL at 09:23

## 2019-06-12 RX ADMIN — METOPROLOL TARTRATE 12.5 MG: 25 TABLET ORAL at 09:21

## 2019-06-12 RX ADMIN — SERTRALINE 50 MG: 50 TABLET, FILM COATED ORAL at 09:21

## 2019-06-12 RX ADMIN — CETIRIZINE HYDROCHLORIDE 5 MG: 10 TABLET, FILM COATED ORAL at 09:22

## 2019-06-12 ASSESSMENT — PAIN DESCRIPTION - LOCATION: LOCATION: HIP

## 2019-06-12 ASSESSMENT — PAIN SCALES - GENERAL
PAINLEVEL_OUTOF10: 5
PAINLEVEL_OUTOF10: 10
PAINLEVEL_OUTOF10: 10
PAINLEVEL_OUTOF10: 5

## 2019-06-12 ASSESSMENT — PAIN DESCRIPTION - FREQUENCY: FREQUENCY: CONTINUOUS

## 2019-06-12 ASSESSMENT — PAIN - FUNCTIONAL ASSESSMENT: PAIN_FUNCTIONAL_ASSESSMENT: PREVENTS OR INTERFERES SOME ACTIVE ACTIVITIES AND ADLS

## 2019-06-12 ASSESSMENT — PAIN DESCRIPTION - DESCRIPTORS: DESCRIPTORS: ACHING

## 2019-06-12 ASSESSMENT — PAIN DESCRIPTION - PROGRESSION: CLINICAL_PROGRESSION: GRADUALLY WORSENING

## 2019-06-12 ASSESSMENT — PAIN DESCRIPTION - ORIENTATION: ORIENTATION: LOWER;RIGHT

## 2019-06-12 NOTE — PROGRESS NOTES
[address] THERAPY  - [facility]  DISCHARGE NOTE    Date: 2019  Patient Name: Milo Blanchard,   Gender: female      MRN: [id number]  : 1933  (80 y.o.)  Referring Practitioner: Dr. Alondra Ma, Dr. Matilda Huerta Attending  Diagnosis: pubic ramus fracture RIght, closed  Additional Pertinent Hx: The patient is a 80 y.o. female  With multiple medical comorbidities who was admitted to ortho services for treatment of a right pelvic fracture. Patient has a history of breast CA with mets to the liver and is currently undergoing weekly chemotherapy. She completed a treatment yesterday and notes that she usually gets dizzy afterwards. She reports a fall in her home after an episode of dizziness yesterday. She fell onto her right side landing on the right hip. She was unable to bear weight afterwards. She denies hitting her head and no LOC. X-ray shows a right superior and inferior pubic rami fracture. Patient lives alone and is typically independent. She does ambulate with a walker. CT negative for R femur fracture.     Restrictions/Precautions:  Restrictions/Precautions: Weight Bearing, Fall Risk    Right Lower Extremity Weight Bearing: Weight Bearing As Tolerated                 Past Medical History:   Diagnosis Date    Anxiety     Blind right eye     Breast cancer metastasized to liver ([facility] [address] 05/10/2016    Liver lesion noted     Carotid stenosis      Left ICA 25%    Colostomy in [facility]) 2016    Degenerative arthritis of cervical spine     GERD (gastroesophageal reflux disease)     Hypercholesteremia     Hypoestrogenism     Hypothyroidism     Lumbago     Lumbosacral spinal stenosis 2019    MDRO (multiple drug resistant organisms) resistance     pt stated cleared    Personal history of cardiac dysrhythmia     Sigmoid diverticulosis     Spondylolisthesis of lumbosacral region     Steroid-induced hyperglycemia  Subclavian artery stenosis (HCC)     left    Superior and Inferior Pubic ramus fracture, right, closed, initial encounter ([facility] 05/21/2019    SVT (supraventricular tachycardia) ([facility] 6/07    Temporal arteritis ([facility]     Vertebral artery occlusion     left    Vitamin D deficiency 06/2017     Past Surgical History:   Procedure Laterality Date    CARDIAC CATHETERIZATION  5/07    CATARACT REMOVAL  right 1/08; left 2/08    CHOLECYSTECTOMY  1/03    COLONOSCOPY  11/06    COLOSTOMY  09/04/2018    REVERSAL OF COLOSTOMY    DILATION AND CURETTAGE OF UTERUS      ENDOSCOPY, COLON, DIAGNOSTIC      EYE SURGERY      EYE SURGERY Right 11/06/2017    Dr Rondi Aschoff in [address]      partial    OTHER SURGICAL HISTORY  05/27/2016    robotic assisted laparoscopic anterior colon resection and end colostomy    [place] OFFICE/OUTPT VISIT,PROCEDURE ONLY N/A 9/4/2018    COLOSTOMY REVERSAL AND PARASTOMAL HERNIA REPAIR performed by Aaron Rios MD at [address]  2002    UPPER GASTROINTESTINAL ENDOSCOPY  11/06          Assessment:  Pt being discharged to SNF. Pt has made slow steady progress towards goals. Pt has met 2/5 STGs and 0/2 LTGs. Pt has exhibited improvement in functional mobility and UB strengthening. Pt continues to exhibit decreased  balance, endurance, safety awareness, and activity tolerance causing barriers to meeting pt's goals. Pt continues to require skilled OT intervention at SNF to improve ADL/IADL performance required for pt to return [facility]. Equipment Recommendations: Other: Refer equipment need to next level of care, recommending shower chair, LHAE, BSC.     Plan:  Discharge patient to [facility]    Goals:  Short term goals  Time Frame for Short term goals: 1 week  Short term goal 1: Pt will complete ADL routine with no cues for adapted tech and LHAE PRN to increase independende in self care tasks NOT MET  Short term goal 2: Pt will complete functional ambulation to and from the BR with SBA and no cues for RW safety to increase independence in toileting routine MET  Short term goal 3: Pt will complete multi step light homemaking tasks with SBA and no > min cues for attention to task to increase independence in clothng retrieval. NOT MET  Short term goal 4: Pt will complete dynamic standing task with B UE release for 8 minutes with SBA and no > 1 vc to attend to task to increase ease with sink side grooming NOT MET  Short term goal 5: Pt will demo indep wtih BUE light strenthenng HEP  for improved UB strength and activity tolerance needed for ease with funcitonal transfers and ADLs MET    Long term goals  Time Frame for Long term goals : 3-4 weeks  Long term goal 1: PT will complete ADL routine with no cues for safe and adapted tech with S for completion to increase independence in self care tasks NOT MET  Long term goal 2: PT will complete 2 step homemaking tasks with S and no cues for problem solving or attention to task to be able to prepare a simple meal NOT MET

## 2019-06-12 NOTE — PLAN OF CARE
Care plan reviewed with patient. Patient verbalize understanding of the plan of care and contribute to goal setting. Problem: Falls - Risk of:  Goal: Will remain free from falls  Description      Outcome: Completed  Note:   Patient being discharged to [facility]. Fall protocol initiated until discharge      Problem: Falls - Risk of:  Goal: Absence of physical injury  Description      Outcome: Completed  Note:   Patient being discharged to [facility]. Bed and chair alarm in use until discharged. Problem: Pain:  Goal: Pain level will decrease  Description      Outcome: Completed  Note:   Patient being discharged to [facility]. Pain management will resume at [facility]     Problem: Pain:  Goal: Control of acute pain  Description      Outcome: Completed     Problem: Pain:  Goal: Control of chronic pain  Description      Outcome: Completed     Problem: Anxiety:  Goal: Level of anxiety will decrease  Description      Outcome: Completed  Note:   Patient being discharged to [facility]. Will resume PRN xanax and will use techniques to help with anxiety. Problem: Bleeding:  Goal: Will show no signs and symptoms of excessive bleeding  Description      Outcome: Completed  Note:   Patient being discharged to F.  No sign or symptom of excessive bleeding noted      Problem: Serum Glucose Level - Abnormal:  Goal: Ability to maintain appropriate glucose levels will improve  Description      Outcome: Completed     Problem: Sensory Perception - Impaired:  Goal: Ability to maintain a stable neurologic state will improve  Description      Outcome: Completed     Problem: Cardiac:  Goal: Ability to maintain vital signs within normal range will improve  Description      Outcome: Completed     Problem: Cardiac:  Goal: Cardiovascular alteration will improve  Description      Outcome: Completed     Problem: Physical Regulation:  Goal: Complications related to the disease process, condition or treatment will be avoided or minimized  Description Outcome: Completed     Problem: Mood - Altered:  Goal: Mood stable  Description      Outcome: Completed  Note:   Patient being discharged to ECF. Mood is stable      Problem: Mood - Altered:  Goal: Mood stable  Description      Outcome: Completed  Note:   Patient being discharged to ECF.  Mood is stable      Problem: Pressure Ulcer - Risk of  Goal: Absence of pressure ulcer  Outcome: Completed  Note:   Patient being discharged to ECF with no pressure ulcer      Problem: Urinary Elimination:  Goal: Skin integrity will improve  Description      Outcome: Completed     Problem: Urinary Elimination:  Goal: Ability to recognize the need to void and respond appropriately will improve  Description      Outcome: Completed  Note:   Patient is continent of bladder      Problem: Urinary Elimination:  Goal: Will remain free from infection  Description      Outcome: Completed  Note:   No sign or symptom of infection noted

## 2019-06-12 NOTE — DISCHARGE INSTR - PHARMACY
Unused medications, especially pain medications, should be disposed to ensure medications do not end up being misused in the future, as well as helping to ensure drugs are not impacting the environment. [facility] and many local police agencies have medication take-back bins to properly destroy these medications. Please see the site https://apps. deadiversion. [place].gov/pubdispsearch/spring/main?execution=e2s1 for additional take-back bins located in your area. · Be safe with medicines. If your doctor prescribed medicine, take it exactly as prescribed. Call your doctor if you think you are having a problem with your medicine. You will get more details on the specific medicines your doctor prescribes.

## 2019-06-12 NOTE — PLAN OF CARE
Problem: Discharge Planning:  Intervention: Involve patient/S. O. in Discharge Planning process  Note:   The patient has been discharged from [address] [place] [address] today, 6/12, and has been [facility] for continued care & therapies. Her transportation was provided by her niece Manuela Patient message was left for the PASSPORT worker Deepa Lai) to confirm discharge location, so that she can continue to follow and determine if patient will be able to return with resumption of services at some point. Discharge goals have been met.  MIGEL Guaman

## 2019-06-12 NOTE — PROGRESS NOTES
[facility]  INPATIENT SPEECH THERAPY  [facility]    TIME   SLP Individual Minutes  Time In: 0825  Time Out: 0840  Minutes: 15  Timed Code Treatment Minutes: 15 Minutes       [x]Discharge Note    Date: 2019  Patient Name: Jason Coulter        MRN: [id number]    : 1933  (80 y.o.)  Gender: female   Primary Provider: Simone Pedraza MD  Admitting Diagnosis:  Right pelvic fracture   Secondary Diagnosis: Cognitive deficits   Precautions: Fall Risk   Swallowing Status/Diet: Standard   Swallowing Strategies: Regular with thin   DATE of last MBS:  N/a   Pain:  5/10; RN notified     Subjective: Patient seen sitting in recliner alert and cooperative. Breakfast arrived midway through session. SHORT TERM GOAL #1:  Goal 1: Patient will complete recall, short term memory and working memory tasks with 70% accuracy provided mod cues to improve overall recall of newly learned information and medication information (Ex. Pain medication PRN)  GOAL NOT MET. INTERVENTIONS:   Orientation:   indep    Recall of 4 ingredients:  Immediate recall:  2/4 indep, 2/4 max cues       SHORT TERM GOAL #2:  Goal 2: Patient will complete functional problem solving/reasoning tasks with 75% accuracy provided mod cues to improve overall safety within hospital and home environement GOAL NOT MET. INTERVENTIONS: Reasoning: patient presented with hypothetical problem solving situation, cued to generate 3 possible supportive reasons of causation:  1/3 indep, 2/3 mod cues  Problem solving: Patient cued to generate safe solution to problem presented:  Patient required max cues to generate safe/functional solution to x1 problem     SHORT TERM GOAL #3:  Goal 3: Patient will complete sequencing and divergent naming tasks with 70% accuracy provided mod cues to improve overall thought organization GOAL NOT MET.     INTERVENTIONS:   Stating word provided category and initial letter: 2/5 max cues, 3/5 unable to elicit appropriate response despite max cues    SHORT TERM GOAL #4:  Goal 4: Patient will complete executive functioning and basic comprehension tasks (Ex. Location of information, navigation) with 70% accuracy provided mod cues to improve overall return to completion of basic ADL's and IADL's. GOAL NOT MET. INTERVENTIONS: Previous tx:  Location/identification of food item prices presented basic low stimuli menu:  2/10 indep, 3/10 mod cues, 5/10 max cues  *poor thought organization and placement     SHORT TERM GOAL #5:  Goal 5: Patient will complete sustained attention tasks with no more than 3 errors/redirections within 2 mintues in order to succesfuly return to completion of multi-step ADL tasks  GOAL NOT MET. INTERVENTIONS: Patient required x5 redirections back to structured cognitive tasks this session   Previous tx:   Task 1:  Counting by 2's, 0-24- patient with overt difficulty and multiple errors, unable to complete task despite max cues provided  Task 2: Stating alphabet C-T- patient with overt difficulty and multiple errors x5+, unable to complete task accurately despite max cues   Task 3: Identifying x1 target within 2 paragraphs; 7/7 min cues + additional time          Long-term Goals  Timeframe for Long-term Goals: 2 weeks   Goal 1: Patient will improve overall cognitive function to a supervision level in order to successfuly and safely return to the home setting with assistance upon discharge  GOAL NOT MET. ASSESSMENT:  Patient has met 0/5 STG's and 0/1 LTG's. Limited progress d/t patient with varying level of pain within therapy session and poor attention to cognitive tasks with poor carryover. Patient continues to present with severe cognitive deficits as evidence by impaired memory, carryover, thought organization, reasoning, problem solving, executive functioning and attention. Patient strengths within the areas of social skills and interactions at the surface level.  No dysarthria, dysphonia, dysphagia or expressive/receptive language deficits. ST with HIGH level of safety concerns in regards to potential discharge to home setting. ST with concerns of potential accidental medication overdose and high fall risk secondary to ongoing cognitive deficits. STRICT 24/hour supervision is highly recommended upon discharge with strict restriction to driving d/t ongoing cognitive impairments and safety concerns. Patient would greatly benefit from skilled ST services in order to target cognitive deficits in order to successfully return to the home setting with high level of support and assistance upon discharge. Assessment: [x]Progressing towards goals          []Not Progressing towards goals       Patient Tolerance of Treatment:   []Tolerated well [x]Tolerated fair []Required rest breaks []Fatigued  Plan for Next Session: Thought organization, problem solving, attention , SAFETY   Education:  Learner:  [x]Patient          []Significant Other          []Other  Education provided regarding:  [x]Goals and POC   []Diet and swallowing precautions    []Home Exercise Program  [x]Progress and/or discharge information  Method of Education:  [x]Discussion          [x]Demonstration          []Handout          []Other  Evaluation of Education:   [x]Verbalized understanding   []Demonstrates without assistance  [x]Demonstrates with assistance  [x]Needs further instruction     []No evidence of learning                  [x]No family present      Plan: []Continue with current plan of care    []Modify current plan of care as follows:    [x]Discharge patient    Discharge Location: [address]    Services/Supervision Recommended:  Yes- Recommend continued ST services    [x]Patient continues to require treatment by a licensed therapist to address functional deficits as outlined in the established plan of care.     TENNILLE Cornejomarilin

## 2019-06-12 NOTE — PROGRESS NOTES
Family Medicine Progress Notes/Coverage    Today's Date: 6/12/19  [id number]  Medical Record # [id number]  Account # [de-identified]      Ms. Rod admitted on 5/24/2019        Subjective / Interval History :     Pain pelvis 5/10 otherwise doing well, No SOB, No chest pain , No fatigue.  No nausea nor abdominal pain  Reviewed notes, consults, laboratory and radiology results,    Objective:       Physical Exam:  Patient Vitals for the past 24 hrs:   BP Temp Temp src Pulse Resp SpO2   06/11/19 2124 (!) 127/57 97.8 °F (36.6 °C) Oral 65 14 90 %       General Appearance:  Alert, cooperative, no distress, appears stated age   [de-identified]:  Neck:      Chest/Lungs:  Heart: Decrease BS otherwise CTA  RRR   Abdomen:  Back: Soft  No CVA tenderness   Extremities:  Neurological Exam: No edema  WNL       Assessment:     Admitting Diagnosis:    right pelvic fracture    Active Hospital Problems    Diagnosis Date Noted    Superior and Inferior Pubic ramus fracture, right, closed, initial encounter [facility] Ayaka Pitts 05/21/2019     Priority: High    Anxiety [F41.9] 06/29/2016     Priority: High    Breast cancer metastasized to liver ([facility] [address] [C50.919, C78.7] 06/08/2016     Priority: High    On antineoplastic chemotherapy [Z79.899] 05/25/2019     Priority: Medium    Lumbosacral spinal stenosis [M48.07] 05/01/2019     Priority: Medium    Personal history of cardiac dysrhythmia [Z86.79]      Priority: Medium    Current chronic use of systemic steroids [Z79.52] 09/04/2018     Priority: Medium    H/O: CVA (cerebrovascular accident) [Z86.73] 11/06/2017     Priority: Medium    Coronary artery disease involving native coronary artery of native heart without angina pectoris [I25.10] 11/06/2017     Priority: Medium    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]      Priority: Medium    Hypothyroidism due to acquired atrophy of thyroid [E03.4] 03/03/2016     Priority: Medium    Hypercholesteremia [E78.00]      Priority: Medium    Temporal arteritis ([address] [M31.6]      Priority: Medium    GERD (gastroesophageal reflux disease) [K21.9]      Priority: Medium       Plan:     Discussed plans with the nursing staff  Discharge to [facility]    Medications, Laboratories and Imaging results:    Scheduled Meds:   polyethylene glycol  17 g Oral Daily    pantoprazole  40 mg Oral BID AC    ipratropium-albuterol  1 ampule Inhalation Q6H WA    clopidogrel  75 mg Oral Daily    aspirin  325 mg Oral Daily    cetirizine  5 mg Oral Daily    isosorbide mononitrate  30 mg Oral Daily    levothyroxine  88 mcg Oral Daily    metoprolol tartrate  12.5 mg Oral BID    predniSONE  2 mg Oral Daily    sertraline  50 mg Oral BID    simvastatin  20 mg Oral Nightly    therapeutic multivitamin-minerals  1 tablet Oral Daily     Continuous Infusions:  PRN Meds:ipratropium-albuterol, acetaminophen, HYDROcodone 5 mg - acetaminophen **OR** HYDROcodone 5 mg - acetaminophen, magnesium hydroxide, ALPRAZolam    Imaging:    Lab Review :    Lab Results   Component Value Date    WBC 9.0 06/03/2019    HGB 9.9 (L) 06/03/2019    HCT 32.9 (L) 06/03/2019    MCV 91.9 06/03/2019     06/03/2019     Lab Results   Component Value Date    CREATININE 0.8 06/03/2019    BUN 21 06/03/2019     06/03/2019    K 4.3 06/03/2019     06/03/2019    CO2 26 06/03/2019     Lab Results   Component Value Date    CKTOTAL 41 10/16/2016     Lab Results   Component Value Date    ALT 15 12/22/2018    AST 14 12/22/2018    ALKPHOS 53 12/22/2018    BILITOT 0.6 12/22/2018     Lab Results   Component Value Date    LIPASE 11.4 12/22/2018         Electronically signed by Michell Hancock MD on 6/12/19 at 8:50 Samson Frye M.D.

## 2019-06-13 ENCOUNTER — TELEPHONE (OUTPATIENT)
Dept: FAMILY MEDICINE CLINIC | Age: 84
End: 2019-06-13

## 2019-06-13 NOTE — TELEPHONE ENCOUNTER
[facility]. Spoke with Felipa Mehta. She voiced English Stacie was admitted to the facility last night. No discharge plans in place yet. CC asked to be contacted prior to discharge. Understanding voiced.

## 2019-06-18 ENCOUNTER — TELEPHONE (OUTPATIENT)
Dept: FAMILY MEDICINE CLINIC | Age: 84
End: 2019-06-18

## 2019-06-18 NOTE — DISCHARGE SUMMARY
[address] [place] [address]                               DISCHARGE SUMMARY    PATIENT NAME: Rubina Chapa                      :        1933  MED REC NO:   [id number]                           ROOM:       2165  ACCOUNT NO:   [de-identified]                           ADMIT DATE: 2019  PROVIDER:     Beth Thomas. Sera Mcgraw M.D.            Hobert Gema: 2019      DISCHARGE FINAL DIAGNOSES:  1. Right superior and inferior pubic ramus fracture, closed. 2.  Anxiety. 3.  History of breast cancer, metastasized to the liver, currently on  chemotherapy. 4.  Lumbosacral spinal stenosis. 5.  Personal history of cardiac dysrhythmia. 6.  Chronic use of systemic steroid. 7.  Coronary artery disease. 8.  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 9.  History of CVA. 10.  Hypothyroidism due to acquired atrophy of the thyroid. 11.  Hypercholesterolemia. 12.  Temporal arteritis. 13.  GERD. The patient is being discharged to [facility]. DISCHARGE MEDICATIONS:  To the ECF Includes,  1. Aspirin 325 mg one tablet daily. 2.  Loratadine 10 mg one tablet daily. 3.  GlycoLax 17 gm daily. 4.  Norco 5/325 mg one tablet every 6 hours for pain. 5.  DuoNeb nebulizer 3 mL every 6 hours. 6.  Calcium carbonate one tablet twice daily. 7.  Lopressor 25 mg one-half tablet twice a day. 8.  Zoloft 50 mg one tablet daily. 9.  Imdur 30 mg one tablet daily. 10.  Synthroid 88 mcg one tablet daily. 11.  Zocor 20 mg one tablet at bedtime. 12.  Plavix 75 mg one tablet daily. 13.  Zofran ODT 4 mg every 8 hours for nausea and vomiting. 14.  Protonix 40 mg one tablet daily. 15.  Prednisone 2 mg one tablet daily. 16.  Tylenol 1000 mg for pain p.r.n.  17.  Centrum one tablet daily. ACTIVITY:  Will be as tolerated as per the nursing home facility  protocol.     CONSULTANTS ON THIS HOSPITALIZATION:  Occupational and Physical

## 2019-07-08 ENCOUNTER — TELEPHONE (OUTPATIENT)
Dept: FAMILY MEDICINE CLINIC | Age: 84
End: 2019-07-08

## 2019-07-22 ENCOUNTER — HOSPITAL ENCOUNTER (OUTPATIENT)
Dept: WOMENS IMAGING | Age: 84
Discharge: HOME | End: 2019-07-22
Payer: MEDICARE

## 2019-07-22 ENCOUNTER — NURSE ONLY (OUTPATIENT)
Dept: LAB | Age: 84
End: 2019-07-22

## 2019-07-22 DIAGNOSIS — C50.912 MALIGNANT NEOPLASM OF LEFT FEMALE BREAST, UNSPECIFIED ESTROGEN RECEPTOR STATUS, UNSPECIFIED SITE OF BREAST (HCC): ICD-10-CM

## 2019-07-22 DIAGNOSIS — R92.8 ABNORMAL MAMMOGRAM: ICD-10-CM

## 2019-07-22 DIAGNOSIS — Z00.6 ENCOUNTER FOR EXAMINATION FOR NORMAL COMPARISON OR CONTROL IN CLINICAL RESEARCH PROGRAM: ICD-10-CM

## 2019-07-22 LAB
BASOPHILS # BLD: 0.1 %
BASOPHILS ABSOLUTE: 0 THOU/MM3 (ref 0–0.1)
EOSINOPHIL # BLD: 0.8 %
EOSINOPHILS ABSOLUTE: 0.1 THOU/MM3 (ref 0–0.4)
ERYTHROCYTE [DISTWIDTH] IN BLOOD BY AUTOMATED COUNT: 15.6 % (ref 11.5–14.5)
ERYTHROCYTE [DISTWIDTH] IN BLOOD BY AUTOMATED COUNT: 51.8 FL (ref 35–45)
HCT VFR BLD CALC: 41.3 % (ref 37–47)
HEMOGLOBIN: 12.2 GM/DL (ref 12–16)
IMMATURE GRANS (ABS): 0.04 THOU/MM3 (ref 0–0.07)
IMMATURE GRANULOCYTES: 0 %
LYMPHOCYTES # BLD: 24.6 %
LYMPHOCYTES ABSOLUTE: 2.8 THOU/MM3 (ref 1–4.8)
MCH RBC QN AUTO: 27.1 PG (ref 26–33)
MCHC RBC AUTO-ENTMCNC: 29.5 GM/DL (ref 32.2–35.5)
MCV RBC AUTO: 91.8 FL (ref 81–99)
MONOCYTES # BLD: 5.6 %
MONOCYTES ABSOLUTE: 0.6 THOU/MM3 (ref 0.4–1.3)
NUCLEATED RED BLOOD CELLS: 0 /100 WBC
PLATELET # BLD: 196 THOU/MM3 (ref 130–400)
PMV BLD AUTO: 11.8 FL (ref 9.4–12.4)
RBC # BLD: 4.5 MILL/MM3 (ref 4.2–5.4)
SEG NEUTROPHILS: 68.5 %
SEGMENTED NEUTROPHILS ABSOLUTE COUNT: 7.7 THOU/MM3 (ref 1.8–7.7)
WBC # BLD: 11.2 THOU/MM3 (ref 4.8–10.8)

## 2019-07-22 PROCEDURE — 3209999900 MAM COMPARISON OF OUTSIDE IMAGES

## 2019-07-22 PROCEDURE — G0279 TOMOSYNTHESIS, MAMMO: HCPCS

## 2019-07-22 PROCEDURE — 76642 ULTRASOUND BREAST LIMITED: CPT

## 2019-07-25 RX ORDER — ROSUVASTATIN CALCIUM 5 MG/1
5 TABLET, COATED ORAL DAILY
Qty: 90 TABLET | Refills: 1 | Status: SHIPPED | OUTPATIENT
Start: 2019-07-25 | End: 2019-08-01 | Stop reason: ALTCHOICE

## 2019-07-26 ENCOUNTER — HOSPITAL ENCOUNTER (OUTPATIENT)
Dept: NUCLEAR MEDICINE | Age: 84
Discharge: HOME | End: 2019-07-26
Payer: MEDICARE

## 2019-07-26 DIAGNOSIS — C50.912 MALIGNANT NEOPLASM OF LEFT FEMALE BREAST, UNSPECIFIED ESTROGEN RECEPTOR STATUS, UNSPECIFIED SITE OF BREAST (HCC): ICD-10-CM

## 2019-07-26 DIAGNOSIS — C78.7 SECONDARY MALIGNANT NEOPLASM OF LIVER (HCC): ICD-10-CM

## 2019-07-26 PROCEDURE — A9503 TC99M MEDRONATE: HCPCS | Performed by: INTERNAL MEDICINE

## 2019-07-26 PROCEDURE — 78306 BONE IMAGING WHOLE BODY: CPT

## 2019-07-26 PROCEDURE — 3430000000 HC RX DIAGNOSTIC RADIOPHARMACEUTICAL: Performed by: INTERNAL MEDICINE

## 2019-07-26 RX ORDER — TC 99M MEDRONATE 20 MG/10ML
25 INJECTION, POWDER, LYOPHILIZED, FOR SOLUTION INTRAVENOUS
Status: COMPLETED | OUTPATIENT
Start: 2019-07-26 | End: 2019-07-26

## 2019-07-26 RX ADMIN — TC 99M MEDRONATE 23.3 MILLICURIE: 20 INJECTION, POWDER, LYOPHILIZED, FOR SOLUTION INTRAVENOUS at 10:50

## 2019-08-01 ENCOUNTER — HOSPITAL ENCOUNTER (OUTPATIENT)
Dept: GENERAL RADIOLOGY | Age: 84
Discharge: HOME | End: 2019-08-01
Payer: MEDICARE

## 2019-08-01 ENCOUNTER — HOSPITAL ENCOUNTER (OUTPATIENT)
Age: 84
Discharge: HOME | End: 2019-08-01
Payer: MEDICARE

## 2019-08-01 DIAGNOSIS — C50.919 MALIGNANT NEOPLASM OF FEMALE BREAST, UNSPECIFIED ESTROGEN RECEPTOR STATUS, UNSPECIFIED LATERALITY, UNSPECIFIED SITE OF BREAST (HCC): ICD-10-CM

## 2019-08-01 PROCEDURE — 71046 X-RAY EXAM CHEST 2 VIEWS: CPT

## 2019-08-13 ENCOUNTER — OFFICE VISIT (OUTPATIENT)
Dept: FAMILY MEDICINE CLINIC | Age: 84
End: 2019-08-13

## 2019-08-13 VITALS
RESPIRATION RATE: 14 BRPM | DIASTOLIC BLOOD PRESSURE: 62 MMHG | HEIGHT: 62 IN | HEART RATE: 66 BPM | SYSTOLIC BLOOD PRESSURE: 124 MMHG | BODY MASS INDEX: 27.82 KG/M2 | WEIGHT: 151.2 LBS

## 2019-08-13 DIAGNOSIS — E03.4 HYPOTHYROIDISM DUE TO ACQUIRED ATROPHY OF THYROID: ICD-10-CM

## 2019-08-13 DIAGNOSIS — Z78.0 POST-MENOPAUSAL: ICD-10-CM

## 2019-08-13 DIAGNOSIS — M31.6 TEMPORAL ARTERITIS (HCC): ICD-10-CM

## 2019-08-13 DIAGNOSIS — Z87.81 HISTORY OF FRACTURE OF PELVIS: ICD-10-CM

## 2019-08-13 DIAGNOSIS — H54.40 BLIND RIGHT EYE: ICD-10-CM

## 2019-08-13 DIAGNOSIS — Z79.52 CURRENT CHRONIC USE OF SYSTEMIC STEROIDS: Primary | ICD-10-CM

## 2019-08-13 PROCEDURE — 1123F ACP DISCUSS/DSCN MKR DOCD: CPT | Performed by: EMERGENCY MEDICINE

## 2019-08-13 PROCEDURE — G8427 DOCREV CUR MEDS BY ELIG CLIN: HCPCS | Performed by: EMERGENCY MEDICINE

## 2019-08-13 PROCEDURE — 1090F PRES/ABSN URINE INCON ASSESS: CPT | Performed by: EMERGENCY MEDICINE

## 2019-08-13 PROCEDURE — G8598 ASA/ANTIPLAT THER USED: HCPCS | Performed by: EMERGENCY MEDICINE

## 2019-08-13 PROCEDURE — 4040F PNEUMOC VAC/ADMIN/RCVD: CPT | Performed by: EMERGENCY MEDICINE

## 2019-08-13 PROCEDURE — 1036F TOBACCO NON-USER: CPT | Performed by: EMERGENCY MEDICINE

## 2019-08-13 PROCEDURE — 99213 OFFICE O/P EST LOW 20 MIN: CPT | Performed by: EMERGENCY MEDICINE

## 2019-08-13 PROCEDURE — G8417 CALC BMI ABV UP PARAM F/U: HCPCS | Performed by: EMERGENCY MEDICINE

## 2019-08-13 RX ORDER — LEVOTHYROXINE SODIUM 88 UG/1
TABLET ORAL
Qty: 90 TABLET | Refills: 1 | Status: SHIPPED | OUTPATIENT
Start: 2019-08-13 | End: 2020-05-18 | Stop reason: SDUPTHER

## 2019-08-13 ASSESSMENT — ENCOUNTER SYMPTOMS
COUGH: 0
SINUS PRESSURE: 0
RHINORRHEA: 0
WHEEZING: 0
ABDOMINAL PAIN: 0
VOICE CHANGE: 0
NAUSEA: 0
ORTHOPNEA: 0
SORE THROAT: 0
CONSTIPATION: 0
CHEST TIGHTNESS: 0
TROUBLE SWALLOWING: 0
BACK PAIN: 0
VOMITING: 0
DIARRHEA: 0
SHORTNESS OF BREATH: 0

## 2019-08-13 ASSESSMENT — PATIENT HEALTH QUESTIONNAIRE - PHQ9
SUM OF ALL RESPONSES TO PHQ QUESTIONS 1-9: 0
SUM OF ALL RESPONSES TO PHQ QUESTIONS 1-9: 0
2. FEELING DOWN, DEPRESSED OR HOPELESS: 0
SUM OF ALL RESPONSES TO PHQ9 QUESTIONS 1 & 2: 0
1. LITTLE INTEREST OR PLEASURE IN DOING THINGS: 0

## 2019-08-13 NOTE — PROGRESS NOTES
Cardiovascular: Normal rate, regular rhythm and intact distal pulses. Exam reveals no friction rub. No murmur heard. Pulmonary/Chest: Effort normal and breath sounds normal. She has no wheezes. She has no rales. She exhibits no tenderness. Abdominal: Soft. Bowel sounds are normal. She exhibits no mass. There is no tenderness. Musculoskeletal: She exhibits no edema. Lymphadenopathy:     She has no cervical adenopathy. Neurological: She is alert and oriented to person, place, and time. Skin: No rash noted. Vitals reviewed. Assessment:         Diagnosis Orders   1. Current chronic use of systemic steroids  DEXA Bone Density 2 Sites   2. History of fracture of pelvis     3. Blind right eye     4. Post-menopausal  DEXA Bone Density 2 Sites   5. Temporal arteritis ([address]     6. Hypothyroidism due to acquired atrophy of thyroid         Plan:      Medications Prescribed:  No orders of the defined types were placed in this encounter. Orders Placed:  Orders Placed This Encounter   Procedures    DEXA Bone Density 2 Sites     Standing Status:   Future     Standing Expiration Date:   8/13/2020        Return in about 3 months (around 11/14/2019). Discussed use, benefit, and side effects of prescribedmedications. All patient questions answered. Pt voiced understanding. Instructedto continue current medications, diet and exercise. Patient agreed with treatmentplan.

## 2019-08-21 ENCOUNTER — TELEPHONE (OUTPATIENT)
Dept: FAMILY MEDICINE CLINIC | Age: 84
End: 2019-08-21

## 2019-08-21 ENCOUNTER — NURSE ONLY (OUTPATIENT)
Dept: LAB | Age: 84
End: 2019-08-21

## 2019-08-21 LAB
ANION GAP SERPL CALCULATED.3IONS-SCNC: 11 MEQ/L (ref 8–16)
BUN BLDV-MCNC: 17 MG/DL (ref 7–22)
CALCIUM SERPL-MCNC: 9.9 MG/DL (ref 8.5–10.5)
CHLORIDE BLD-SCNC: 104 MEQ/L (ref 98–111)
CO2: 27 MEQ/L (ref 23–33)
CREAT SERPL-MCNC: 0.8 MG/DL (ref 0.4–1.2)
GFR SERPL CREATININE-BSD FRML MDRD: 68 ML/MIN/1.73M2
POTASSIUM SERPL-SCNC: 4.8 MEQ/L (ref 3.5–5.2)
SEDIMENTATION RATE, ERYTHROCYTE: 15 MM/HR (ref 0–20)
SODIUM BLD-SCNC: 142 MEQ/L (ref 135–145)
VITAMIN D 25-HYDROXY: 23 NG/ML (ref 30–100)

## 2019-08-22 NOTE — TELEPHONE ENCOUNTER
Dalia, daughter called - said that Dr. Davida Dodd office is supposed to be sending a copy of the Dexa Scan that pt had done in 2018. Which we do have now. So please cancel the order for the Dexa Scan that was ordered on 8/13/19.

## 2019-09-08 ENCOUNTER — APPOINTMENT (OUTPATIENT)
Dept: GENERAL RADIOLOGY | Age: 84
End: 2019-09-08
Payer: MEDICARE

## 2019-09-08 ENCOUNTER — APPOINTMENT (OUTPATIENT)
Dept: CT IMAGING | Age: 84
End: 2019-09-08
Payer: MEDICARE

## 2019-09-08 ENCOUNTER — HOSPITAL ENCOUNTER (EMERGENCY)
Age: 84
Discharge: HOME | End: 2019-09-08
Payer: MEDICARE

## 2019-09-08 VITALS
RESPIRATION RATE: 18 BRPM | SYSTOLIC BLOOD PRESSURE: 149 MMHG | TEMPERATURE: 98.7 F | WEIGHT: 150 LBS | DIASTOLIC BLOOD PRESSURE: 70 MMHG | OXYGEN SATURATION: 96 % | HEART RATE: 66 BPM | BODY MASS INDEX: 27.44 KG/M2

## 2019-09-08 DIAGNOSIS — S50.00XA CONTUSION OF ELBOW, UNSPECIFIED LATERALITY, INITIAL ENCOUNTER: ICD-10-CM
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
## 2019-09-08 PROCEDURE — 73080 X-RAY EXAM OF ELBOW: CPT

## 2019-09-08 PROCEDURE — 73200 CT UPPER EXTREMITY W/O DYE: CPT

## 2019-09-08 PROCEDURE — 2709999900 HC NON-CHARGEABLE SUPPLY

## 2019-09-08 PROCEDURE — 6370000000 HC RX 637 (ALT 250 FOR IP): Performed by: NURSE PRACTITIONER

## 2019-09-08 PROCEDURE — 70450 CT HEAD/BRAIN W/O DYE: CPT

## 2019-09-08 PROCEDURE — 73030 X-RAY EXAM OF SHOULDER: CPT

## 2019-09-08 PROCEDURE — 72125 CT NECK SPINE W/O DYE: CPT

## 2019-09-08 PROCEDURE — 99284 EMERGENCY DEPT VISIT MOD MDM: CPT

## 2019-09-08 RX ORDER — ACETAMINOPHEN 325 MG/1
650 TABLET ORAL ONCE
Status: COMPLETED | OUTPATIENT
Start: 2019-09-08 | End: 2019-09-08

## 2019-09-08 RX ORDER — HYDROCODONE BITARTRATE AND ACETAMINOPHEN 5; 325 MG/1; MG/1
1 TABLET ORAL ONCE
Status: COMPLETED | OUTPATIENT
Start: 2019-09-08 | End: 2019-09-08

## 2019-09-08 RX ADMIN — ACETAMINOPHEN 650 MG: 325 TABLET ORAL at 15:58

## 2019-09-08 RX ADMIN — HYDROCODONE BITARTRATE AND ACETAMINOPHEN 1 TABLET: 5; 325 TABLET ORAL at 17:04

## 2019-09-08 ASSESSMENT — ENCOUNTER SYMPTOMS
SHORTNESS OF BREATH: 0
NAUSEA: 0
WHEEZING: 0
RHINORRHEA: 0
EYE PAIN: 0
EYE DISCHARGE: 0
DIARRHEA: 0
SORE THROAT: 0
VOMITING: 0
COUGH: 0
BACK PAIN: 0
ABDOMINAL PAIN: 0

## 2019-09-08 ASSESSMENT — PAIN DESCRIPTION - DESCRIPTORS: DESCRIPTORS: THROBBING;SHARP

## 2019-09-08 ASSESSMENT — PAIN DESCRIPTION - PAIN TYPE: TYPE: ACUTE PAIN

## 2019-09-08 ASSESSMENT — PAIN DESCRIPTION - LOCATION: LOCATION: SHOULDER;ELBOW

## 2019-09-08 ASSESSMENT — PAIN SCALES - GENERAL
PAINLEVEL_OUTOF10: 8
PAINLEVEL_OUTOF10: 6

## 2019-09-08 ASSESSMENT — PAIN DESCRIPTION - ORIENTATION: ORIENTATION: RIGHT

## 2019-09-08 NOTE — ED TRIAGE NOTES
Pt presents to the ED from home after she tripped over her nephew's dog and fell onto her right elbow and shoulder. Denies hitting head. States her neck is tight, but thinks it's from her shoulder injury. Pt has a skin tear on her right elbow. States she cannot move her right shoulder. Pt is on plavix d/t stent placement in her heart. Pt last took plavix yesterday. Pt denies loss of consciousness, dizziness, or diplopia. Provider at bedside.

## 2019-09-08 NOTE — ED PROVIDER NOTES
[facility]  eMERGENCY dEPARTMENT eNCOUnter          CHIEF COMPLAINT       Chief Complaint   Patient presents with    Fall       Nurses Notes reviewed and I agree except as noted in the HPI. HISTORY OF PRESENT ILLNESS    Rosemarie Marinelli is a 80 y.o. female who presents to the Emergency Department for the evaluation of a fall about 30 minutes PTA to ED. Patient was at home when she tripped over her dog and fell on her right shoulder and elbow. She is currently on Plavix as she has had stents placed. Patient did not hit her head, lose consciousness, or sustain any other injuries. Patient believes her shoulder hit the kitchen cabinet while her elbow hit the concrete floor. She previously had a broken pelvis in May and was in the nursing home for recovery. Patient denies any chest pain, shortness of breath, vomiting, or any other symptoms. The HPI was provided by the patient. REVIEW OF SYSTEMS     Review of Systems   Constitutional: Negative for appetite change, chills, fatigue and fever. HENT: Negative for congestion, ear pain, rhinorrhea and sore throat. Eyes: Negative for pain, discharge and visual disturbance. Respiratory: Negative for cough, shortness of breath and wheezing. Cardiovascular: Negative for chest pain, palpitations and leg swelling. Gastrointestinal: Negative for abdominal pain, diarrhea, nausea and vomiting. Genitourinary: Negative for difficulty urinating, dysuria, hematuria and vaginal discharge. Musculoskeletal: Positive for arthralgias. Negative for back pain, joint swelling and neck pain. Skin: Positive for wound. Negative for pallor and rash. Neurological: Negative for dizziness, syncope, weakness, light-headedness, numbness and headaches. Hematological: Negative for adenopathy. Psychiatric/Behavioral: Negative for confusion and suicidal ideas. The patient is not nervous/anxious.         PAST MEDICAL HISTORY    has a past medical history of R-1OTC      ipratropium-albuterol (DUONEB) 0.5-2.5 (3) MG/3ML SOLN nebulizer solution Inhale 3 mLs into the lungs every 6 hours while awake, Disp-30 vial, R-0Print      Calcium Carbonate-Vitamin D (OYSTER SHELL CALCIUM/D) 500-200 MG-UNIT TABS take 1 tablet by mouth twice a day, R-0Historical Med      metoprolol tartrate (LOPRESSOR) 25 MG tablet take 1/2 tablet by mouth twice a day, Disp-90 tablet, R-1Normal      sertraline (ZOLOFT) 50 MG tablet take 1 tablet by mouth twice a day, Disp-180 tablet, R-1Normal      isosorbide mononitrate (IMDUR) 30 MG extended release tablet take 1 tablet by mouth once daily, Disp-45 tablet, R-3Normal      ondansetron (ZOFRAN ODT) 4 MG disintegrating tablet Take 1 tablet by mouth every 8 hours as needed for Nausea, Disp-20 tablet, R-0Print      predniSONE (DELTASONE) 1 MG tablet Take 2 mg by mouth daily Historical Med      acetaminophen (TYLENOL) 500 MG tablet Take 1,000 mg by mouth as needed for Pain      Multiple Vitamins-Minerals (CENTRUM SILVER ADULT 50+) TABS Take 1 tablet by mouth daily, Disp-90 tablet, R-1             ALLERGIES     is allergic to bactrim [sulfamethoxazole-trimethoprim]; demerol; and pcn [penicillins]. FAMILY HISTORY     She indicated that her mother is . She indicated that her father is . She indicated that the status of her sister is unknown. She indicated that the status of her son is unknown.   family history includes Cancer (age of onset: 48) in her son; Cancer (age of onset: 61) in her sister; Cancer (age of onset: 79) in her brother; Cancer (age of onset: 68) in her brother. SOCIAL HISTORY      reports that she has quit smoking. Her smoking use included cigarettes. She smoked 0.50 packs per day. She has never used smokeless tobacco. She reports that she does not drink alcohol or use drugs. PHYSICAL EXAM     INITIAL VITALS:  weight is 150 lb (68 kg). Her oral temperature is 98.7 °F (37.1 °C).  Her blood pressure is 149/70 (abnormal) and her pulse is 66. Her respiration is 18 and oxygen saturation is 96%. Physical Exam   Constitutional: She is oriented to person, place, and time. She appears well-developed and well-nourished. Non-toxic appearance. HENT:   Head: Normocephalic and atraumatic. Right Ear: Tympanic membrane and external ear normal.   Left Ear: Tympanic membrane and external ear normal.   Nose: Nose normal.   Mouth/Throat: Oropharynx is clear and moist and mucous membranes are normal. No oropharyngeal exudate, posterior oropharyngeal edema or posterior oropharyngeal erythema. Eyes: Conjunctivae and EOM are normal.   Neck: Normal range of motion. Neck supple. No JVD present. No cervical tenderness   Cardiovascular: Normal rate, regular rhythm, normal heart sounds, intact distal pulses and normal pulses. Exam reveals no gallop and no friction rub. No murmur heard. Pulmonary/Chest: Effort normal and breath sounds normal. No respiratory distress. She has no decreased breath sounds. She has no wheezes. She has no rhonchi. She has no rales. Abdominal: Soft. Bowel sounds are normal. She exhibits no distension. There is no tenderness. There is no rebound, no guarding and no CVA tenderness. Musculoskeletal:   10 cm skin tear to right elbow. Bruising noted around elbow. Tenderness with limited range of motion to right shoulder. Neurological: She is alert and oriented to person, place, and time. She exhibits normal muscle tone. Coordination normal.   Skin: Skin is warm and dry. No rash noted. She is not diaphoretic. Nursing note and vitals reviewed. DIFFERENTIAL DIAGNOSIS:   Shoulder fracture, elbow fracture, elbow dislocation, low suspicion of intracranial hemorrhage    DIAGNOSTIC RESULTS     EKG:   None    RADIOLOGY: non-plainfilm images(s) such as CT, Ultrasound and MRI are read by the radiologist.    CT SHOULDER RIGHT WO CONTRAST   Preliminary Result   No acute osseous findings.         CT Head WO Contrast   Final accurately records my words and actions.     Natividad Caal CNP 9/8/19 7:23 PM       PAMELA Paniagua - DELON  09/08/19 0639

## 2019-09-08 NOTE — ED NOTES
Patient transported to Radiology department via [employer] tech in stable condition.        Apurva Richardson RN  09/08/19 [id number]

## 2019-09-10 ENCOUNTER — TELEPHONE (OUTPATIENT)
Dept: FAMILY MEDICINE CLINIC | Age: 84
End: 2019-09-10

## 2019-09-17 ENCOUNTER — OFFICE VISIT (OUTPATIENT)
Dept: FAMILY MEDICINE CLINIC | Age: 84
End: 2019-09-17

## 2019-09-17 VITALS
WEIGHT: 142.6 LBS | HEART RATE: 60 BPM | DIASTOLIC BLOOD PRESSURE: 70 MMHG | RESPIRATION RATE: 12 BRPM | BODY MASS INDEX: 26.24 KG/M2 | HEIGHT: 62 IN | SYSTOLIC BLOOD PRESSURE: 122 MMHG

## 2019-09-17 DIAGNOSIS — S51.011D SKIN TEAR OF RIGHT ELBOW WITHOUT COMPLICATION, SUBSEQUENT ENCOUNTER: ICD-10-CM

## 2019-09-17 DIAGNOSIS — S46.011S: Primary | ICD-10-CM

## 2019-09-17 DIAGNOSIS — R19.7 DIARRHEA, UNSPECIFIED TYPE: ICD-10-CM

## 2019-09-17 DIAGNOSIS — Z23 NEED FOR IMMUNIZATION AGAINST INFLUENZA: ICD-10-CM

## 2019-09-17 PROCEDURE — 1090F PRES/ABSN URINE INCON ASSESS: CPT | Performed by: EMERGENCY MEDICINE

## 2019-09-17 PROCEDURE — 4040F PNEUMOC VAC/ADMIN/RCVD: CPT | Performed by: EMERGENCY MEDICINE

## 2019-09-17 PROCEDURE — 1036F TOBACCO NON-USER: CPT | Performed by: EMERGENCY MEDICINE

## 2019-09-17 PROCEDURE — 90688 IIV4 VACCINE SPLT 0.5 ML IM: CPT | Performed by: EMERGENCY MEDICINE

## 2019-09-17 PROCEDURE — G8417 CALC BMI ABV UP PARAM F/U: HCPCS | Performed by: EMERGENCY MEDICINE

## 2019-09-17 PROCEDURE — G8598 ASA/ANTIPLAT THER USED: HCPCS | Performed by: EMERGENCY MEDICINE

## 2019-09-17 PROCEDURE — G8427 DOCREV CUR MEDS BY ELIG CLIN: HCPCS | Performed by: EMERGENCY MEDICINE

## 2019-09-17 PROCEDURE — 99213 OFFICE O/P EST LOW 20 MIN: CPT | Performed by: EMERGENCY MEDICINE

## 2019-09-17 PROCEDURE — G0008 ADMIN INFLUENZA VIRUS VAC: HCPCS | Performed by: EMERGENCY MEDICINE

## 2019-09-17 PROCEDURE — 1123F ACP DISCUSS/DSCN MKR DOCD: CPT | Performed by: EMERGENCY MEDICINE

## 2019-09-17 ASSESSMENT — ENCOUNTER SYMPTOMS
CHEST TIGHTNESS: 0
SORE THROAT: 0
VOICE CHANGE: 0
VOMITING: 0
RHINORRHEA: 0
DIARRHEA: 1
ABDOMINAL PAIN: 0
SHORTNESS OF BREATH: 0
COUGH: 0
BACK PAIN: 0
NAUSEA: 0
CONSTIPATION: 0
SINUS PRESSURE: 0
TROUBLE SWALLOWING: 0
WHEEZING: 0

## 2019-09-17 NOTE — PROGRESS NOTES
Desmond Mayers MD     Requested Specialty:   Orthopedic Surgery     Number of Visits Requested:   1       Discussed with the patient regarding home health nurse and aide at this time I am not sure if she needs nurse and an aide how either she thinks that she needs it    Results of x-rays and CT done 9/8/19 are reviewed     Return in about 3 months (around 12/17/2019). Discussed use, benefit, and side effects of prescribedmedications. All patient questions answered. Pt voiced understanding. Instructedto continue current medications, diet and exercise. Patient agreed with treatmentplan.

## 2019-09-23 RX ORDER — CLOPIDOGREL BISULFATE 75 MG/1
TABLET ORAL
Qty: 90 TABLET | Refills: 1 | Status: SHIPPED | OUTPATIENT
Start: 2019-09-23 | End: 2020-03-31 | Stop reason: SDUPTHER

## 2019-10-07 ENCOUNTER — TELEPHONE (OUTPATIENT)
Dept: FAMILY MEDICINE CLINIC | Age: 84
End: 2019-10-07

## 2019-10-14 ENCOUNTER — TELEPHONE (OUTPATIENT)
Dept: FAMILY MEDICINE CLINIC | Age: 84
End: 2019-10-14

## 2019-10-15 RX ORDER — DICLOFENAC SODIUM 300 MG/G
CREAM TOPICAL
Qty: 2500 G | Refills: 1 | Status: ON HOLD | OUTPATIENT
Start: 2019-10-15 | End: 2020-08-15 | Stop reason: HOSPADM

## 2019-10-21 ENCOUNTER — TELEPHONE (OUTPATIENT)
Dept: FAMILY MEDICINE CLINIC | Age: 84
End: 2019-10-21

## 2019-12-11 RX ORDER — B-COMPLEX WITH VITAMIN C
TABLET ORAL
Qty: 100 TABLET | Refills: 0 | Status: SHIPPED | OUTPATIENT
Start: 2019-12-11 | End: 2020-01-31

## 2019-12-24 ENCOUNTER — TELEPHONE (OUTPATIENT)
Dept: FAMILY MEDICINE CLINIC | Age: 84
End: 2019-12-24

## 2019-12-24 RX ORDER — DOXYCYCLINE HYCLATE 100 MG
100 TABLET ORAL 2 TIMES DAILY
Qty: 20 TABLET | Refills: 0 | Status: SHIPPED | OUTPATIENT
Start: 2019-12-24 | End: 2020-01-02

## 2020-01-02 ENCOUNTER — OFFICE VISIT (OUTPATIENT)
Dept: FAMILY MEDICINE CLINIC | Age: 85
End: 2020-01-02

## 2020-01-02 VITALS
BODY MASS INDEX: 26.22 KG/M2 | HEIGHT: 62 IN | RESPIRATION RATE: 14 BRPM | HEART RATE: 64 BPM | SYSTOLIC BLOOD PRESSURE: 104 MMHG | WEIGHT: 142.5 LBS | DIASTOLIC BLOOD PRESSURE: 52 MMHG

## 2020-01-02 PROBLEM — E55.9 VITAMIN D DEFICIENCY: Status: ACTIVE | Noted: 2017-06-01

## 2020-01-02 PROCEDURE — G8417 CALC BMI ABV UP PARAM F/U: HCPCS | Performed by: EMERGENCY MEDICINE

## 2020-01-02 PROCEDURE — 1090F PRES/ABSN URINE INCON ASSESS: CPT | Performed by: EMERGENCY MEDICINE

## 2020-01-02 PROCEDURE — 1036F TOBACCO NON-USER: CPT | Performed by: EMERGENCY MEDICINE

## 2020-01-02 PROCEDURE — 4040F PNEUMOC VAC/ADMIN/RCVD: CPT | Performed by: EMERGENCY MEDICINE

## 2020-01-02 PROCEDURE — 1123F ACP DISCUSS/DSCN MKR DOCD: CPT | Performed by: EMERGENCY MEDICINE

## 2020-01-02 PROCEDURE — G8427 DOCREV CUR MEDS BY ELIG CLIN: HCPCS | Performed by: EMERGENCY MEDICINE

## 2020-01-02 PROCEDURE — 99213 OFFICE O/P EST LOW 20 MIN: CPT | Performed by: EMERGENCY MEDICINE

## 2020-01-02 RX ORDER — LORATADINE 10 MG/1
TABLET ORAL
Qty: 90 TABLET | Refills: 1 | Status: SHIPPED | OUTPATIENT
Start: 2020-01-02 | End: 2020-11-19 | Stop reason: SDUPTHER

## 2020-01-02 RX ORDER — ALPRAZOLAM 0.5 MG/1
TABLET ORAL
Qty: 45 TABLET | Refills: 0 | Status: SHIPPED | OUTPATIENT
Start: 2020-01-02 | End: 2021-03-30 | Stop reason: SDUPTHER

## 2020-01-02 ASSESSMENT — ENCOUNTER SYMPTOMS
COUGH: 0
RHINORRHEA: 0
TROUBLE SWALLOWING: 0
VOICE CHANGE: 0
ABDOMINAL PAIN: 0
SINUS PRESSURE: 0
WHEEZING: 0
CONSTIPATION: 0
BACK PAIN: 0
DIARRHEA: 0
SHORTNESS OF BREATH: 0
CHEST TIGHTNESS: 0
SORE THROAT: 0
VOMITING: 0
NAUSEA: 0

## 2020-01-02 ASSESSMENT — PATIENT HEALTH QUESTIONNAIRE - PHQ9
SUM OF ALL RESPONSES TO PHQ QUESTIONS 1-9: 2
SUM OF ALL RESPONSES TO PHQ QUESTIONS 1-9: 2
2. FEELING DOWN, DEPRESSED OR HOPELESS: 1
1. LITTLE INTEREST OR PLEASURE IN DOING THINGS: 1
SUM OF ALL RESPONSES TO PHQ9 QUESTIONS 1 & 2: 2

## 2020-01-02 NOTE — PROGRESS NOTES
Visit Date: 1/2/2020    Subjective:    Teressa Solis is a 80 y. o.female who presents today for:  Chief Complaint   Patient presents with    Check-Up         HPI:     HPI     Patient here for refill of her Xanax for anxiety and sleep. Patient stable with her anxiety and sleep    Chest cough and cold is better now, Got bitten by her dog left forearm1 week ago. Home health nurse to be in helping her in her wound, the wound is healing well.     Still on Oxygen due to CAD and hypoxemia      CurrentHome Medications:  Current Outpatient Medications   Medication Sig Dispense Refill    loratadine (RA LORATADINE) 10 MG tablet take 1 tablet by mouth once daily 90 tablet 1    ALPRAZolam (XANAX) 0.5 MG tablet take 1 tablet by mouth at bedtime if needed for sleep 45 tablet 0    Calcium Carbonate-Vitamin D (OYSTER SHELL CALCIUM/D) 500-200 MG-UNIT TABS take 1 tablet by mouth twice a day 100 tablet 0    metoprolol tartrate (LOPRESSOR) 25 MG tablet take 1/2 tablet by mouth twice a day 90 tablet 1    diclofenac sodium 1 % GEL Apply 4 g topically 4 times daily As needed for arthritis 2 Tube 5    Diclofenac Sodium 3 % CREA Apply 2 g to affected area 1-2 x daily 2500 g 1    clopidogrel (PLAVIX) 75 MG tablet take 1 tablet by mouth once daily 90 tablet 1    levothyroxine (SYNTHROID) 88 MCG tablet take 1 tablet by mouth once daily 90 tablet 1    simvastatin (ZOCOR) 20 MG tablet Take 1 tablet by mouth nightly 90 tablet 1    pantoprazole (PROTONIX) 40 MG tablet take 1 tablet by mouth once daily 90 tablet 1    aspirin 325 MG EC tablet Take 1 tablet by mouth daily 30 tablet 3    sertraline (ZOLOFT) 50 MG tablet take 1 tablet by mouth twice a day 180 tablet 1    isosorbide mononitrate (IMDUR) 30 MG extended release tablet take 1 tablet by mouth once daily 45 tablet 3    ondansetron (ZOFRAN ODT) 4 MG disintegrating tablet Take 1 tablet by mouth every 8 hours as needed for Nausea 20 tablet 0    predniSONE (DELTASONE) 1 MG tablet Take 2 mg by mouth daily       acetaminophen (TYLENOL) 500 MG tablet Take 1,000 mg by mouth as needed for Pain      Multiple Vitamins-Minerals (CENTRUM SILVER ADULT 50+) TABS Take 1 tablet by mouth daily 90 tablet 1    ipratropium-albuterol (DUONEB) 0.5-2.5 (3) MG/3ML SOLN nebulizer solution Inhale 3 mLs into the lungs every 6 hours while awake (Patient not taking: Reported on 1/2/2020) 30 vial 0     No current facility-administered medications for this visit. Subjective:      Review of Systems   Constitutional: Negative for appetite change, chills, diaphoresis, fatigue and fever. HENT: Negative for congestion, ear pain, postnasal drip, rhinorrhea, sinus pressure, sneezing, sore throat, trouble swallowing and voice change. Respiratory: Negative for cough, chest tightness, shortness of breath and wheezing. Cardiovascular: Negative for chest pain, palpitations and leg swelling. Gastrointestinal: Negative for abdominal pain, constipation, diarrhea, nausea and vomiting. Musculoskeletal: Negative for arthralgias, back pain, joint swelling, myalgias, neck pain and neck stiffness. Wound left forearm   Neurological: Negative for dizziness, syncope, weakness, light-headedness, numbness and headaches. Psychiatric/Behavioral: Positive for sleep disturbance. The patient is nervous/anxious. Objective:     BP (!) 104/52 (Site: Right Upper Arm, Position: Sitting, Cuff Size: Medium Adult)   Pulse 64   Resp 14   Ht 5' 2\" (1.575 m)   Wt 142 lb 8 oz (64.6 kg)   LMP  (LMP Unknown)   BMI 26.06 kg/m²   BP Readings from Last 3 Encounters:   01/02/20 (!) 104/52   09/17/19 122/70   09/08/19 (!) 149/70     Wt Readings from Last 3 Encounters:   01/02/20 142 lb 8 oz (64.6 kg)   09/17/19 142 lb 9.6 oz (64.7 kg)   09/08/19 150 lb (68 kg)       Physical Exam  Vitals signs reviewed. Constitutional:       Appearance: She is well-developed. HENT:      Head: Normocephalic and atraumatic.       Right Ear: Procedures    CBC Auto Differential     Standing Status:   Future     Standing Expiration Date:   1/2/2021    Comprehensive Metabolic Panel     Standing Status:   Future     Standing Expiration Date:   1/1/2021    Lipid Panel     Standing Status:   Future     Standing Expiration Date:   1/1/2021     Order Specific Question:   Is Patient Fasting?/# of Hours     Answer:   YES 12 HOURS    TSH without Reflex     Standing Status:   Future     Standing Expiration Date:   1/2/2021    Vitamin D 25 Hydroxy     Standing Status:   Future     Standing Expiration Date:   1/1/2021       Patient needs to continue oxygen, needs portable oxygen to being mobile at home    Uses oxygen by nasal cannula, 2 L. Return in about 14 weeks (around 4/9/2020). Discussed use, benefit, and side effects of prescribedmedications. All patient questions answered. Pt voiced understanding. Instructedto continue current medications, diet and exercise. Patient agreed with treatmentplan.

## 2020-01-29 NOTE — TELEPHONE ENCOUNTER
Date of last visit:  1/2/2020  Date of next visit:  4/9/2020    Requested Prescriptions     Pending Prescriptions Disp Refills    sertraline (ZOLOFT) 50 MG tablet [Pharmacy Med Name: SERTRALINE HCL 50 MG TABLET] 180 tablet 1     Sig: take 1 tablet by mouth twice a day

## 2020-01-31 RX ORDER — B-COMPLEX WITH VITAMIN C
TABLET ORAL
Qty: 100 TABLET | Refills: 0 | Status: SHIPPED | OUTPATIENT
Start: 2020-01-31 | End: 2020-03-03

## 2020-02-11 ENCOUNTER — TELEPHONE (OUTPATIENT)
Dept: FAMILY MEDICINE CLINIC | Age: 85
End: 2020-02-11

## 2020-02-11 RX ORDER — LEVOFLOXACIN 500 MG/1
500 TABLET, FILM COATED ORAL DAILY
Qty: 10 TABLET | Refills: 0 | Status: SHIPPED | OUTPATIENT
Start: 2020-02-11 | End: 2020-02-21

## 2020-02-17 ENCOUNTER — TELEPHONE (OUTPATIENT)
Dept: FAMILY MEDICINE CLINIC | Age: 85
End: 2020-02-17

## 2020-03-03 ENCOUNTER — OFFICE VISIT (OUTPATIENT)
Dept: FAMILY MEDICINE CLINIC | Age: 85
End: 2020-03-03

## 2020-03-03 VITALS
SYSTOLIC BLOOD PRESSURE: 92 MMHG | BODY MASS INDEX: 28.17 KG/M2 | DIASTOLIC BLOOD PRESSURE: 64 MMHG | RESPIRATION RATE: 18 BRPM | HEIGHT: 60 IN | HEART RATE: 60 BPM | WEIGHT: 143.5 LBS

## 2020-03-03 PROCEDURE — G8417 CALC BMI ABV UP PARAM F/U: HCPCS | Performed by: EMERGENCY MEDICINE

## 2020-03-03 PROCEDURE — 1123F ACP DISCUSS/DSCN MKR DOCD: CPT | Performed by: EMERGENCY MEDICINE

## 2020-03-03 PROCEDURE — 99213 OFFICE O/P EST LOW 20 MIN: CPT | Performed by: EMERGENCY MEDICINE

## 2020-03-03 PROCEDURE — G8427 DOCREV CUR MEDS BY ELIG CLIN: HCPCS | Performed by: EMERGENCY MEDICINE

## 2020-03-03 PROCEDURE — 1036F TOBACCO NON-USER: CPT | Performed by: EMERGENCY MEDICINE

## 2020-03-03 PROCEDURE — 1090F PRES/ABSN URINE INCON ASSESS: CPT | Performed by: EMERGENCY MEDICINE

## 2020-03-03 PROCEDURE — 4040F PNEUMOC VAC/ADMIN/RCVD: CPT | Performed by: EMERGENCY MEDICINE

## 2020-03-03 PROCEDURE — G0438 PPPS, INITIAL VISIT: HCPCS | Performed by: EMERGENCY MEDICINE

## 2020-03-03 ASSESSMENT — ENCOUNTER SYMPTOMS
SINUS PRESSURE: 0
SORE THROAT: 0
CHEST TIGHTNESS: 0
BACK PAIN: 0
DIARRHEA: 0
ABDOMINAL PAIN: 0
RHINORRHEA: 0
VOMITING: 0
WHEEZING: 0
TROUBLE SWALLOWING: 0
VOICE CHANGE: 0
COUGH: 1
CONSTIPATION: 0
SHORTNESS OF BREATH: 0
NAUSEA: 0

## 2020-03-03 ASSESSMENT — PATIENT HEALTH QUESTIONNAIRE - PHQ9
SUM OF ALL RESPONSES TO PHQ QUESTIONS 1-9: 0
1. LITTLE INTEREST OR PLEASURE IN DOING THINGS: 0
2. FEELING DOWN, DEPRESSED OR HOPELESS: 0
SUM OF ALL RESPONSES TO PHQ9 QUESTIONS 1 & 2: 0
SUM OF ALL RESPONSES TO PHQ QUESTIONS 1-9: 0

## 2020-03-03 ASSESSMENT — LIFESTYLE VARIABLES: HOW OFTEN DO YOU HAVE A DRINK CONTAINING ALCOHOL: 0

## 2020-03-03 NOTE — PATIENT INSTRUCTIONS
Personalized Preventive Plan for Teressa Solis - 3/3/2020  Medicare offers a range of preventive health benefits. Some of the tests and screenings are paid in full while other may be subject to a deductible, co-insurance, and/or copay. Some of these benefits include a comprehensive review of your medical history including lifestyle, illnesses that may run in your family, and various assessments and screenings as appropriate. After reviewing your medical record and screening and assessments performed today your provider may have ordered immunizations, labs, imaging, and/or referrals for you. A list of these orders (if applicable) as well as your Preventive Care list are included within your After Visit Summary for your review. Other Preventive Recommendations:    · A preventive eye exam performed by an eye specialist is recommended every 1-2 years to screen for glaucoma; cataracts, macular degeneration, and other eye disorders. · A preventive dental visit is recommended every 6 months. · Try to get at least 150 minutes of exercise per week or 10,000 steps per day on a pedometer . · Order or download the FREE \"Exercise & Physical Activity: Your Everyday Guide\" from [employer]. Call 7-532.954.7656 or search [employer] online. · You need 2423-5772 mg of calcium and 9035-4638 IU of vitamin D per day. It is possible to meet your calcium requirement with diet alone, but a vitamin D supplement is usually necessary to meet this goal.  · When exposed to the sun, use a sunscreen that protects against both UVA and UVB radiation with an SPF of 30 or greater. Reapply every 2 to 3 hours or after sweating, drying off with a towel, or swimming. · Always wear a seat belt when traveling in a car. Always wear a helmet when riding a bicycle or motorcycle.

## 2020-03-03 NOTE — PROGRESS NOTES
Abnormal  Total Score Interpretation: Positive Mini-Cog  Did the patient refuse to take the cognition test?: No  Cognitive Impairment Interventions:  · Patient declines any further evaluation/treatment for cognitive impairment    Health Habits/Nutrition:  Health Habits/Nutrition  Do you exercise for at least 20 minutes 2-3 times per week?: Yes  Have you lost any weight without trying in the past 3 months?: No  Do you eat fewer than 2 meals per day?: No  Have you seen a dentist within the past year?: (!) No  Body mass index is 28.03 kg/m². Health Habits/Nutrition Interventions:  · Inadequate physical activity:  educational materials provided to promote increased physical activity    Hearing/Vision:  No exam data present  Hearing/Vision  Do you or your family notice any trouble with your hearing?: No  Do you have difficulty driving, watching TV, or doing any of your daily activities because of your eyesight?: No  Have you had an eye exam within the past year?: (!) No  Hearing/Vision Interventions:  · stable    Safety:  Safety  Do you have working smoke detectors?: Yes  Have all throw rugs been removed or fastened?: Yes  Do you have non-slip mats or surfaces in all bathtubs/showers?: (!) No  Do all of your stairways have a railing or banister?: (!) No  Are your doorways, halls and stairs free of clutter?: Yes  Do you always fasten your seatbelt when you are in a car?: Yes  Safety Interventions:  · Home safety tips provided    ADL:  ADLs  In the past 7 days, did you need help from others to perform any of the following everyday activities? Eating, dressing, grooming, bathing, toileting, or walking/balance?: (!) Dressing, Grooming, Bathing, Toileting, Walking/Balance  In the past 7 days, did you need help from others to take care of any of the following?  Laundry, housekeeping, banking/finances, shopping, telephone use, food preparation, transportation, or taking medications?: [employer], Housekeeping, Banking/Finances, \"rattling going on in her chest\"    Shoulder Pain     right         HPI:     HPI     [facility] heard crackles on chest noted by here [facility], thus sent here to be check. , had occasional cough.  Finished Levaquin antibiotic last Thursday      CurrentHome Medications:  Current Outpatient Medications   Medication Sig Dispense Refill    Calcium Carbonate-Vitamin D (OYSTER SHELL CALCIUM/D) 500-200 MG-UNIT TABS take 1 tablet by mouth twice a day 180 tablet 3    sertraline (ZOLOFT) 50 MG tablet take 1 tablet by mouth twice a day 180 tablet 1    isosorbide mononitrate (IMDUR) 30 MG extended release tablet take 1 tablet by mouth once daily 90 tablet 1    loratadine (RA LORATADINE) 10 MG tablet take 1 tablet by mouth once daily 90 tablet 1    metoprolol tartrate (LOPRESSOR) 25 MG tablet take 1/2 tablet by mouth twice a day 90 tablet 1    diclofenac sodium 1 % GEL Apply 4 g topically 4 times daily As needed for arthritis 2 Tube 5    Diclofenac Sodium 3 % CREA Apply 2 g to affected area 1-2 x daily 2500 g 1    clopidogrel (PLAVIX) 75 MG tablet take 1 tablet by mouth once daily 90 tablet 1    levothyroxine (SYNTHROID) 88 MCG tablet take 1 tablet by mouth once daily 90 tablet 1    simvastatin (ZOCOR) 20 MG tablet Take 1 tablet by mouth nightly 90 tablet 1    pantoprazole (PROTONIX) 40 MG tablet take 1 tablet by mouth once daily 90 tablet 1    aspirin 325 MG EC tablet Take 1 tablet by mouth daily 30 tablet 3    ipratropium-albuterol (DUONEB) 0.5-2.5 (3) MG/3ML SOLN nebulizer solution Inhale 3 mLs into the lungs every 6 hours while awake 30 vial 0    ondansetron (ZOFRAN ODT) 4 MG disintegrating tablet Take 1 tablet by mouth every 8 hours as needed for Nausea 20 tablet 0    predniSONE (DELTASONE) 1 MG tablet Take 2 mg by mouth daily       acetaminophen (TYLENOL) 500 MG tablet Take 1,000 mg by mouth as needed for Pain      Multiple Vitamins-Minerals (CENTRUM SILVER ADULT 50+) TABS Take 1 tablet by mouth daily 90 tablet 1

## 2020-03-18 ENCOUNTER — NURSE ONLY (OUTPATIENT)
Dept: LAB | Age: 85
End: 2020-03-18

## 2020-03-18 LAB
ALT SERPL-CCNC: 9 U/L (ref 11–66)
BASOPHILS # BLD: 0.1 %
BASOPHILS ABSOLUTE: 0 THOU/MM3 (ref 0–0.1)
CALCIUM SERPL-MCNC: 9.2 MG/DL (ref 8.5–10.5)
CREAT SERPL-MCNC: 0.7 MG/DL (ref 0.4–1.2)
EOSINOPHIL # BLD: 1.1 %
EOSINOPHILS ABSOLUTE: 0.1 THOU/MM3 (ref 0–0.4)
ERYTHROCYTE [DISTWIDTH] IN BLOOD BY AUTOMATED COUNT: 16.5 % (ref 11.5–14.5)
ERYTHROCYTE [DISTWIDTH] IN BLOOD BY AUTOMATED COUNT: 56.6 FL (ref 35–45)
GFR SERPL CREATININE-BSD FRML MDRD: 79 ML/MIN/1.73M2
HCT VFR BLD CALC: 40.9 % (ref 37–47)
HEMOGLOBIN: 12 GM/DL (ref 12–16)
IMMATURE GRANS (ABS): 0.02 THOU/MM3 (ref 0–0.07)
IMMATURE GRANULOCYTES: 0.2 %
LYMPHOCYTES # BLD: 19.5 %
LYMPHOCYTES ABSOLUTE: 1.6 THOU/MM3 (ref 1–4.8)
MCH RBC QN AUTO: 27.2 PG (ref 26–33)
MCHC RBC AUTO-ENTMCNC: 29.3 GM/DL (ref 32.2–35.5)
MCV RBC AUTO: 92.7 FL (ref 81–99)
MONOCYTES # BLD: 6 %
MONOCYTES ABSOLUTE: 0.5 THOU/MM3 (ref 0.4–1.3)
NUCLEATED RED BLOOD CELLS: 0 /100 WBC
PLATELET # BLD: 188 THOU/MM3 (ref 130–400)
PMV BLD AUTO: 12.6 FL (ref 9.4–12.4)
RBC # BLD: 4.41 MILL/MM3 (ref 4.2–5.4)
RHEUMATOID FACTOR: 11 IU/ML (ref 0–13)
SEDIMENTATION RATE, ERYTHROCYTE: 15 MM/HR (ref 0–20)
SEG NEUTROPHILS: 73.1 %
SEGMENTED NEUTROPHILS ABSOLUTE COUNT: 6.1 THOU/MM3 (ref 1.8–7.7)
VITAMIN D 25-HYDROXY: 36 NG/ML (ref 30–100)
WBC # BLD: 8.4 THOU/MM3 (ref 4.8–10.8)

## 2020-03-19 ENCOUNTER — HOSPITAL ENCOUNTER (OUTPATIENT)
Age: 85
Discharge: HOME | End: 2020-03-19
Payer: MEDICARE

## 2020-03-19 ENCOUNTER — HOSPITAL ENCOUNTER (OUTPATIENT)
Dept: GENERAL RADIOLOGY | Age: 85
Discharge: HOME | End: 2020-03-19
Payer: MEDICARE

## 2020-03-19 LAB — CYCLIC CITRULLINATED PEPTIDE ANTIBODY IGG: < 1.5 U/ML

## 2020-03-19 PROCEDURE — 71101 X-RAY EXAM UNILAT RIBS/CHEST: CPT

## 2020-03-25 ENCOUNTER — TELEPHONE (OUTPATIENT)
Dept: FAMILY MEDICINE CLINIC | Age: 85
End: 2020-03-25

## 2020-03-25 NOTE — TELEPHONE ENCOUNTER
Pablo Chang from [facility] called stating pt slid off her bed today to the floor. Pt caught her blankets and went down to floor slowly per Pablo Chang. No injuries.

## 2020-03-31 RX ORDER — SIMVASTATIN 20 MG
20 TABLET ORAL NIGHTLY
Qty: 90 TABLET | Refills: 1 | Status: SHIPPED | OUTPATIENT
Start: 2020-03-31 | End: 2020-06-30 | Stop reason: SDUPTHER

## 2020-03-31 RX ORDER — CLOPIDOGREL BISULFATE 75 MG/1
TABLET ORAL
Qty: 90 TABLET | Refills: 1 | Status: SHIPPED | OUTPATIENT
Start: 2020-03-31 | End: 2020-09-22

## 2020-03-31 NOTE — TELEPHONE ENCOUNTER
Date of last visit:  3/3/2020  Date of next visit:  6/4/2020    Requested Prescriptions     Pending Prescriptions Disp Refills    clopidogrel (PLAVIX) 75 MG tablet 90 tablet 1     Sig: take 1 tablet by mouth once daily    simvastatin (ZOCOR) 20 MG tablet 90 tablet 1     Sig: Take 1 tablet by mouth nightly

## 2020-04-03 ENCOUNTER — TELEPHONE (OUTPATIENT)
Dept: FAMILY MEDICINE CLINIC | Age: 85
End: 2020-04-03

## 2020-04-14 RX ORDER — ISOSORBIDE MONONITRATE 30 MG/1
TABLET, EXTENDED RELEASE ORAL
Qty: 90 TABLET | Refills: 1 | Status: SHIPPED | OUTPATIENT
Start: 2020-04-14 | End: 2020-10-06

## 2020-04-27 NOTE — TELEPHONE ENCOUNTER
Date of last visit:  3/3/2020  Date of next visit:  6/4/2020    Requested Prescriptions     Pending Prescriptions Disp Refills    pantoprazole (PROTONIX) 40 MG tablet 90 tablet 1     Sig: take 1 tablet by mouth once daily    sertraline (ZOLOFT) 50 MG tablet 180 tablet 1     Sig: take 1 tablet by mouth twice a day   Date of last visit:  3/3/2020  Date of next visit:  6/4/2020    Requested Prescriptions     Pending Prescriptions Disp Refills    pantoprazole (PROTONIX) 40 MG tablet 90 tablet 1     Sig: take 1 tablet by mouth once daily

## 2020-04-28 RX ORDER — PANTOPRAZOLE SODIUM 40 MG/1
TABLET, DELAYED RELEASE ORAL
Qty: 90 TABLET | Refills: 1 | Status: SHIPPED | OUTPATIENT
Start: 2020-04-28 | End: 2020-10-19

## 2020-05-18 RX ORDER — LEVOTHYROXINE SODIUM 88 UG/1
TABLET ORAL
Qty: 90 TABLET | Refills: 1 | Status: SHIPPED | OUTPATIENT
Start: 2020-05-18 | End: 2020-08-25 | Stop reason: DRUGHIGH

## 2020-05-26 ENCOUNTER — HOSPITAL ENCOUNTER (OUTPATIENT)
Dept: NUCLEAR MEDICINE | Age: 85
Discharge: HOME | End: 2020-05-26
Payer: MEDICARE

## 2020-05-26 ENCOUNTER — HOSPITAL ENCOUNTER (OUTPATIENT)
Dept: ULTRASOUND IMAGING | Age: 85
Discharge: HOME | End: 2020-05-26
Payer: MEDICARE

## 2020-05-26 PROCEDURE — 78306 BONE IMAGING WHOLE BODY: CPT

## 2020-05-26 PROCEDURE — A9503 TC99M MEDRONATE: HCPCS | Performed by: INTERNAL MEDICINE

## 2020-05-26 PROCEDURE — 3430000000 HC RX DIAGNOSTIC RADIOPHARMACEUTICAL: Performed by: INTERNAL MEDICINE

## 2020-05-26 PROCEDURE — 76705 ECHO EXAM OF ABDOMEN: CPT

## 2020-05-26 RX ORDER — TC 99M MEDRONATE 20 MG/10ML
24.2 INJECTION, POWDER, LYOPHILIZED, FOR SOLUTION INTRAVENOUS
Status: COMPLETED | OUTPATIENT
Start: 2020-05-26 | End: 2020-05-26

## 2020-05-26 RX ADMIN — TC 99M MEDRONATE 24.2 MILLICURIE: 20 INJECTION, POWDER, LYOPHILIZED, FOR SOLUTION INTRAVENOUS at 11:30

## 2020-06-04 ENCOUNTER — VIRTUAL VISIT (OUTPATIENT)
Dept: FAMILY MEDICINE CLINIC | Age: 85
End: 2020-06-04

## 2020-06-04 PROCEDURE — G8417 CALC BMI ABV UP PARAM F/U: HCPCS | Performed by: EMERGENCY MEDICINE

## 2020-06-04 PROCEDURE — 4040F PNEUMOC VAC/ADMIN/RCVD: CPT | Performed by: EMERGENCY MEDICINE

## 2020-06-04 PROCEDURE — 99213 OFFICE O/P EST LOW 20 MIN: CPT | Performed by: EMERGENCY MEDICINE

## 2020-06-04 PROCEDURE — 1090F PRES/ABSN URINE INCON ASSESS: CPT | Performed by: EMERGENCY MEDICINE

## 2020-06-04 PROCEDURE — G8427 DOCREV CUR MEDS BY ELIG CLIN: HCPCS | Performed by: EMERGENCY MEDICINE

## 2020-06-04 PROCEDURE — 1123F ACP DISCUSS/DSCN MKR DOCD: CPT | Performed by: EMERGENCY MEDICINE

## 2020-06-04 PROCEDURE — 1036F TOBACCO NON-USER: CPT | Performed by: EMERGENCY MEDICINE

## 2020-06-04 ASSESSMENT — ENCOUNTER SYMPTOMS
NAUSEA: 0
CONSTIPATION: 0
VOICE CHANGE: 0
BACK PAIN: 1
VOMITING: 0
DIARRHEA: 0
SORE THROAT: 0
COUGH: 0
ABDOMINAL PAIN: 0
SHORTNESS OF BREATH: 0
SINUS PRESSURE: 0
WHEEZING: 0
RHINORRHEA: 0
TROUBLE SWALLOWING: 0
CHEST TIGHTNESS: 0

## 2020-06-04 NOTE — PROGRESS NOTES
daily (Patient not taking: Reported on 6/4/2020) 90 tablet 1    ipratropium-albuterol (DUONEB) 0.5-2.5 (3) MG/3ML SOLN nebulizer solution Inhale 3 mLs into the lungs every 6 hours while awake (Patient not taking: Reported on 6/4/2020) 30 vial 0     No current facility-administered medications for this visit. Subjective:      Review of Systems   Constitutional: Negative for appetite change, chills, diaphoresis, fatigue and fever. HENT: Negative for congestion, ear pain, postnasal drip, rhinorrhea, sinus pressure, sneezing, sore throat, trouble swallowing and voice change. Respiratory: Negative for cough, chest tightness, shortness of breath and wheezing. Cardiovascular: Negative for chest pain, palpitations and leg swelling. Gastrointestinal: Negative for abdominal pain, constipation, diarrhea, nausea and vomiting. Musculoskeletal: Positive for arthralgias and back pain. Negative for joint swelling, myalgias, neck pain and neck stiffness. Neurological: Negative for dizziness, syncope, weakness, light-headedness, numbness and headaches. Objective:     LMP  (LMP Unknown)   BP Readings from Last 3 Encounters:   03/03/20 92/64   01/02/20 (!) 104/52   09/17/19 122/70     Wt Readings from Last 3 Encounters:   03/03/20 143 lb 8 oz (65.1 kg)   01/02/20 142 lb 8 oz (64.6 kg)   09/17/19 142 lb 9.6 oz (64.7 kg)       Physical Exam    Physical Examination:  not done, this is a telemedicine  Patient is looking alert, active, cooperative , no difficulty of breathing    Assessment:         Diagnosis Orders   1. Hypercholesteremia     2. Hypothyroidism due to acquired atrophy of thyroid     3. History of fracture of pelvis     4. Current chronic use of systemic steroids         :      Medications Prescribed:  No orders of the defined types were placed in this encounter. Orders Placed:  No orders of the defined types were placed in this encounter.       Telemedicine time : 15 minutes    Discussed about COVID 19 , viral upper respiratory infection, signs and symptoms, talk about they are high risks, hygiene, washing hands, use of mask, covering mouth when coughing, avoid traveling especially airplane, cruise, conference, seminars, attending masses , avoid exposure to children and Millennials. Encouraged the patient to call the office is having more concerned. Results of bone scan and ultrasound the liver done 5/26/20 were reviewed with the patient      Call or Return in about 13 weeks (around 9/3/2020). Discussed use, benefit, and side effects of prescribedmedications. All patient questions answered. Pt voiced understanding. Instructedto continue current medications, diet and exercise. Patient agreed with treatmentplan.

## 2020-06-08 ENCOUNTER — HOSPITAL ENCOUNTER (OUTPATIENT)
Dept: INTERVENTIONAL RADIOLOGY/VASCULAR | Age: 85
Discharge: HOME | End: 2020-06-08
Payer: MEDICARE

## 2020-06-08 PROCEDURE — 93971 EXTREMITY STUDY: CPT

## 2020-06-29 NOTE — TELEPHONE ENCOUNTER
Date of last visit:  3/3/2020  Date of next visit:  9/3/2020    Requested Prescriptions     Pending Prescriptions Disp Refills    simvastatin (ZOCOR) 20 MG tablet 90 tablet 1     Sig: Take 1 tablet by mouth nightly

## 2020-06-30 RX ORDER — SIMVASTATIN 20 MG
20 TABLET ORAL NIGHTLY
Qty: 90 TABLET | Refills: 1 | Status: SHIPPED | OUTPATIENT
Start: 2020-06-30 | End: 2020-12-15

## 2020-08-07 ENCOUNTER — TELEPHONE (OUTPATIENT)
Dept: FAMILY MEDICINE CLINIC | Age: 85
End: 2020-08-07

## 2020-08-11 ENCOUNTER — TELEPHONE (OUTPATIENT)
Dept: FAMILY MEDICINE CLINIC | Age: 85
End: 2020-08-11

## 2020-08-11 RX ORDER — PROMETHAZINE HYDROCHLORIDE 25 MG/1
25 TABLET ORAL EVERY 6 HOURS PRN
Qty: 12 TABLET | Refills: 0 | Status: SHIPPED | OUTPATIENT
Start: 2020-08-11 | End: 2020-08-11 | Stop reason: SDUPTHER

## 2020-08-11 RX ORDER — PROMETHAZINE HYDROCHLORIDE 25 MG/1
25 TABLET ORAL EVERY 6 HOURS PRN
Qty: 12 TABLET | Refills: 0 | Status: SHIPPED | OUTPATIENT
Start: 2020-08-11 | End: 2022-07-26

## 2020-08-11 RX ORDER — ERGOCALCIFEROL 1.25 MG/1
CAPSULE ORAL
Status: ON HOLD | COMMUNITY
Start: 2020-07-05 | End: 2022-05-28 | Stop reason: HOSPADM

## 2020-08-11 RX ORDER — HYDROCODONE BITARTRATE AND ACETAMINOPHEN 5; 325 MG/1; MG/1
TABLET ORAL
Status: ON HOLD | COMMUNITY
Start: 2020-06-11 | End: 2020-08-15 | Stop reason: HOSPADM

## 2020-08-11 NOTE — TELEPHONE ENCOUNTER
Dalia called stating that pt is having diarrhea and vomiting. It started last night. Pt took imodium which has slowed down the diarrhea but she still has upset stomach. She is not wanting to eat. She is asking if something would be able to be called in for the upset stomach. If not able to call something in then is there anything you can suggest OTC. [facility] on [facility]    Please let Dalia know if something called in or what she would be able to get OTC.     Dalia may be reached at [phone]

## 2020-08-12 ENCOUNTER — HOSPITAL ENCOUNTER (INPATIENT)
Age: 85
LOS: 3 days | Discharge: HOME HEALTH | DRG: 392 | End: 2020-08-15
Attending: EMERGENCY MEDICINE | Admitting: EMERGENCY MEDICINE
Payer: MEDICARE

## 2020-08-12 ENCOUNTER — APPOINTMENT (OUTPATIENT)
Dept: GENERAL RADIOLOGY | Age: 85
DRG: 392 | End: 2020-08-12
Payer: MEDICARE

## 2020-08-12 ENCOUNTER — APPOINTMENT (OUTPATIENT)
Dept: CT IMAGING | Age: 85
DRG: 392 | End: 2020-08-12
Payer: MEDICARE

## 2020-08-12 PROBLEM — R10.9 ABDOMINAL PAIN: Status: ACTIVE | Noted: 2020-08-12

## 2020-08-12 LAB
ALBUMIN SERPL-MCNC: 3.8 G/DL (ref 3.5–5.1)
ALP BLD-CCNC: 39 U/L (ref 38–126)
ALT SERPL-CCNC: 10 U/L (ref 11–66)
ANION GAP SERPL CALCULATED.3IONS-SCNC: 11 MEQ/L (ref 8–16)
AST SERPL-CCNC: 17 U/L (ref 5–40)
BASOPHILS # BLD: 0.1 %
BASOPHILS ABSOLUTE: 0 THOU/MM3 (ref 0–0.1)
BILIRUB SERPL-MCNC: 0.6 MG/DL (ref 0.3–1.2)
BUN BLDV-MCNC: 14 MG/DL (ref 7–22)
CALCIUM SERPL-MCNC: 8.8 MG/DL (ref 8.5–10.5)
CHLORIDE BLD-SCNC: 104 MEQ/L (ref 98–111)
CO2: 28 MEQ/L (ref 23–33)
CREAT SERPL-MCNC: 0.7 MG/DL (ref 0.4–1.2)
EKG ATRIAL RATE: 63 BPM
EKG P-R INTERVAL: 148 MS
EKG Q-T INTERVAL: 402 MS
EKG QRS DURATION: 76 MS
EKG QTC CALCULATION (BAZETT): 411 MS
EKG R AXIS: 2 DEGREES
EKG T AXIS: 136 DEGREES
EKG VENTRICULAR RATE: 63 BPM
EOSINOPHIL # BLD: 0.4 %
EOSINOPHILS ABSOLUTE: 0 THOU/MM3 (ref 0–0.4)
ERYTHROCYTE [DISTWIDTH] IN BLOOD BY AUTOMATED COUNT: 15.2 % (ref 11.5–14.5)
ERYTHROCYTE [DISTWIDTH] IN BLOOD BY AUTOMATED COUNT: 53.6 FL (ref 35–45)
GFR SERPL CREATININE-BSD FRML MDRD: 79 ML/MIN/1.73M2
GLUCOSE BLD-MCNC: 120 MG/DL (ref 70–108)
HCT VFR BLD CALC: 41.9 % (ref 37–47)
HEMOGLOBIN: 12.4 GM/DL (ref 12–16)
IMMATURE GRANS (ABS): 0.03 THOU/MM3 (ref 0–0.07)
IMMATURE GRANULOCYTES: 0.3 %
LACTIC ACID, SEPSIS: 0.7 MMOL/L (ref 0.5–1.9)
LIPASE: 12.6 U/L (ref 5.6–51.3)
LYMPHOCYTES # BLD: 12.7 %
LYMPHOCYTES ABSOLUTE: 1.3 THOU/MM3 (ref 1–4.8)
MCH RBC QN AUTO: 28 PG (ref 26–33)
MCHC RBC AUTO-ENTMCNC: 29.6 GM/DL (ref 32.2–35.5)
MCV RBC AUTO: 94.6 FL (ref 81–99)
MONOCYTES # BLD: 6.1 %
MONOCYTES ABSOLUTE: 0.6 THOU/MM3 (ref 0.4–1.3)
NUCLEATED RED BLOOD CELLS: 0 /100 WBC
OSMOLALITY CALCULATION: 286.6 MOSMOL/KG (ref 275–300)
PLATELET # BLD: 167 THOU/MM3 (ref 130–400)
PMV BLD AUTO: 11.9 FL (ref 9.4–12.4)
POTASSIUM REFLEX MAGNESIUM: 4 MEQ/L (ref 3.5–5.2)
RBC # BLD: 4.43 MILL/MM3 (ref 4.2–5.4)
SEG NEUTROPHILS: 80.4 %
SEGMENTED NEUTROPHILS ABSOLUTE COUNT: 8.1 THOU/MM3 (ref 1.8–7.7)
SODIUM BLD-SCNC: 143 MEQ/L (ref 135–145)
TOTAL PROTEIN: 5.6 G/DL (ref 6.1–8)
TROPONIN T: < 0.01 NG/ML
WBC # BLD: 10.1 THOU/MM3 (ref 4.8–10.8)

## 2020-08-12 PROCEDURE — 85025 COMPLETE CBC W/AUTO DIFF WBC: CPT

## 2020-08-12 PROCEDURE — 99223 1ST HOSP IP/OBS HIGH 75: CPT | Performed by: SURGERY

## 2020-08-12 PROCEDURE — 74177 CT ABD & PELVIS W/CONTRAST: CPT

## 2020-08-12 PROCEDURE — 83690 ASSAY OF LIPASE: CPT

## 2020-08-12 PROCEDURE — 96374 THER/PROPH/DIAG INJ IV PUSH: CPT

## 2020-08-12 PROCEDURE — 0D9670Z DRAINAGE OF STOMACH WITH DRAINAGE DEVICE, VIA NATURAL OR ARTIFICIAL OPENING: ICD-10-PCS | Performed by: SURGERY

## 2020-08-12 PROCEDURE — 84484 ASSAY OF TROPONIN QUANT: CPT

## 2020-08-12 PROCEDURE — 6360000004 HC RX CONTRAST MEDICATION: Performed by: EMERGENCY MEDICINE

## 2020-08-12 PROCEDURE — 80053 COMPREHEN METABOLIC PANEL: CPT

## 2020-08-12 PROCEDURE — 0097U HC GI PTHGN MULT REV TRANS & AMP PRB TECH 22 TRGT: CPT

## 2020-08-12 PROCEDURE — 99284 EMERGENCY DEPT VISIT MOD MDM: CPT

## 2020-08-12 PROCEDURE — 2580000003 HC RX 258: Performed by: EMERGENCY MEDICINE

## 2020-08-12 PROCEDURE — 1200000000 HC SEMI PRIVATE

## 2020-08-12 PROCEDURE — 93005 ELECTROCARDIOGRAM TRACING: CPT | Performed by: EMERGENCY MEDICINE

## 2020-08-12 PROCEDURE — 6360000002 HC RX W HCPCS: Performed by: EMERGENCY MEDICINE

## 2020-08-12 PROCEDURE — 71045 X-RAY EXAM CHEST 1 VIEW: CPT

## 2020-08-12 PROCEDURE — 83605 ASSAY OF LACTIC ACID: CPT

## 2020-08-12 PROCEDURE — 99285 EMERGENCY DEPT VISIT HI MDM: CPT

## 2020-08-12 PROCEDURE — 36415 COLL VENOUS BLD VENIPUNCTURE: CPT

## 2020-08-12 PROCEDURE — 93010 ELECTROCARDIOGRAM REPORT: CPT | Performed by: INTERNAL MEDICINE

## 2020-08-12 RX ORDER — ACETAMINOPHEN 325 MG/1
650 TABLET ORAL EVERY 6 HOURS PRN
Status: DISCONTINUED | OUTPATIENT
Start: 2020-08-12 | End: 2020-08-15 | Stop reason: HOSPADM

## 2020-08-12 RX ORDER — LEVOTHYROXINE SODIUM 88 UG/1
88 TABLET ORAL DAILY
Status: DISCONTINUED | OUTPATIENT
Start: 2020-08-13 | End: 2020-08-15 | Stop reason: HOSPADM

## 2020-08-12 RX ORDER — ACETAMINOPHEN 650 MG/1
650 SUPPOSITORY RECTAL EVERY 6 HOURS PRN
Status: DISCONTINUED | OUTPATIENT
Start: 2020-08-12 | End: 2020-08-15 | Stop reason: HOSPADM

## 2020-08-12 RX ORDER — DEXTROSE AND SODIUM CHLORIDE 5; .9 G/100ML; G/100ML
INJECTION, SOLUTION INTRAVENOUS CONTINUOUS
Status: DISCONTINUED | OUTPATIENT
Start: 2020-08-12 | End: 2020-08-15 | Stop reason: HOSPADM

## 2020-08-12 RX ORDER — PREDNISONE 1 MG/1
2 TABLET ORAL DAILY
Status: DISCONTINUED | OUTPATIENT
Start: 2020-08-13 | End: 2020-08-15 | Stop reason: HOSPADM

## 2020-08-12 RX ORDER — ONDANSETRON 2 MG/ML
4 INJECTION INTRAMUSCULAR; INTRAVENOUS EVERY 6 HOURS PRN
Status: DISCONTINUED | OUTPATIENT
Start: 2020-08-12 | End: 2020-08-15 | Stop reason: HOSPADM

## 2020-08-12 RX ORDER — LIDOCAINE HYDROCHLORIDE 20 MG/ML
JELLY TOPICAL ONCE
Status: DISCONTINUED | OUTPATIENT
Start: 2020-08-12 | End: 2020-08-15 | Stop reason: HOSPADM

## 2020-08-12 RX ORDER — OXYMETAZOLINE HYDROCHLORIDE 0.05 G/100ML
2 SPRAY NASAL ONCE
Status: DISCONTINUED | OUTPATIENT
Start: 2020-08-12 | End: 2020-08-15 | Stop reason: HOSPADM

## 2020-08-12 RX ORDER — CETIRIZINE HYDROCHLORIDE 10 MG/1
10 TABLET ORAL DAILY
Status: DISCONTINUED | OUTPATIENT
Start: 2020-08-13 | End: 2020-08-15 | Stop reason: HOSPADM

## 2020-08-12 RX ORDER — SODIUM CHLORIDE 0.9 % (FLUSH) 0.9 %
10 SYRINGE (ML) INJECTION EVERY 12 HOURS SCHEDULED
Status: DISCONTINUED | OUTPATIENT
Start: 2020-08-12 | End: 2020-08-15 | Stop reason: HOSPADM

## 2020-08-12 RX ORDER — IPRATROPIUM BROMIDE AND ALBUTEROL SULFATE 2.5; .5 MG/3ML; MG/3ML
3 SOLUTION RESPIRATORY (INHALATION)
Status: DISCONTINUED | OUTPATIENT
Start: 2020-08-12 | End: 2020-08-15 | Stop reason: HOSPADM

## 2020-08-12 RX ORDER — SODIUM CHLORIDE 0.9 % (FLUSH) 0.9 %
10 SYRINGE (ML) INJECTION PRN
Status: DISCONTINUED | OUTPATIENT
Start: 2020-08-12 | End: 2020-08-15 | Stop reason: HOSPADM

## 2020-08-12 RX ORDER — PROMETHAZINE HYDROCHLORIDE 25 MG/1
12.5 TABLET ORAL EVERY 6 HOURS PRN
Status: DISCONTINUED | OUTPATIENT
Start: 2020-08-12 | End: 2020-08-15 | Stop reason: HOSPADM

## 2020-08-12 RX ORDER — ONDANSETRON 2 MG/ML
4 INJECTION INTRAMUSCULAR; INTRAVENOUS ONCE
Status: COMPLETED | OUTPATIENT
Start: 2020-08-12 | End: 2020-08-12

## 2020-08-12 RX ORDER — CLOPIDOGREL BISULFATE 75 MG/1
75 TABLET ORAL DAILY
Status: DISCONTINUED | OUTPATIENT
Start: 2020-08-13 | End: 2020-08-15 | Stop reason: HOSPADM

## 2020-08-12 RX ORDER — PANTOPRAZOLE SODIUM 40 MG/10ML
40 INJECTION, POWDER, LYOPHILIZED, FOR SOLUTION INTRAVENOUS DAILY
Status: DISCONTINUED | OUTPATIENT
Start: 2020-08-13 | End: 2020-08-15 | Stop reason: HOSPADM

## 2020-08-12 RX ORDER — ISOSORBIDE MONONITRATE 30 MG/1
30 TABLET, EXTENDED RELEASE ORAL DAILY
Status: DISCONTINUED | OUTPATIENT
Start: 2020-08-13 | End: 2020-08-15 | Stop reason: HOSPADM

## 2020-08-12 RX ADMIN — ONDANSETRON 4 MG: 2 INJECTION INTRAMUSCULAR; INTRAVENOUS at 16:35

## 2020-08-12 RX ADMIN — DEXTROSE AND SODIUM CHLORIDE: 5; 900 INJECTION, SOLUTION INTRAVENOUS at 23:44

## 2020-08-12 RX ADMIN — IOPAMIDOL 80 ML: 755 INJECTION, SOLUTION INTRAVENOUS at 16:22

## 2020-08-12 ASSESSMENT — PAIN DESCRIPTION - ONSET: ONSET: ON-GOING

## 2020-08-12 ASSESSMENT — PAIN DESCRIPTION - LOCATION
LOCATION: ABDOMEN

## 2020-08-12 ASSESSMENT — PAIN DESCRIPTION - PAIN TYPE
TYPE: ACUTE PAIN

## 2020-08-12 ASSESSMENT — PAIN DESCRIPTION - PROGRESSION: CLINICAL_PROGRESSION: GRADUALLY IMPROVING

## 2020-08-12 ASSESSMENT — PAIN SCALES - GENERAL
PAINLEVEL_OUTOF10: 6
PAINLEVEL_OUTOF10: 7
PAINLEVEL_OUTOF10: 7
PAINLEVEL_OUTOF10: 8
PAINLEVEL_OUTOF10: 10
PAINLEVEL_OUTOF10: 7

## 2020-08-12 ASSESSMENT — PAIN - FUNCTIONAL ASSESSMENT: PAIN_FUNCTIONAL_ASSESSMENT: ACTIVITIES ARE NOT PREVENTED

## 2020-08-12 ASSESSMENT — PAIN DESCRIPTION - ORIENTATION: ORIENTATION: MID

## 2020-08-12 ASSESSMENT — PAIN DESCRIPTION - DESCRIPTORS: DESCRIPTORS: DISCOMFORT;CRAMPING

## 2020-08-12 ASSESSMENT — PAIN DESCRIPTION - FREQUENCY: FREQUENCY: CONTINUOUS

## 2020-08-12 NOTE — ED NOTES
Pt resting on cot with niece at bedside. Pt stating her pain has improved since NG insertion. Pt respirations unlabored.       Brain [employer] IAGEIB, RN  08/12/20 [id number]

## 2020-08-12 NOTE — ED NOTES
ED to inpatient nurses report    Chief Complaint   Patient presents with    Nausea    Emesis    Diarrhea      Present to ED from home  LOC: alert and orientated to name, place, date  Vital signs   Vitals:    08/12/20 1436 08/12/20 1551 08/12/20 1719 08/12/20 1812   BP: 101/88 (!) 101/58 (!) 104/50 (!) 121/54   Pulse: 62 77 61 61   Resp: 16 21 18 17   Temp: 98.4 °F (36.9 °C)      TempSrc: Oral      SpO2: 93% 95% 96% 95%   Weight: 138 lb (62.6 kg)      Height: 5' (1.524 m)         Oxygen Baseline room air    Current needs required nasal canula 2L Bipap/Cpap No  LDAs:   Peripheral IV 08/12/20 Left Antecubital (Active)   Site Assessment Clean;Dry; Intact 08/12/20 1720   Line Status Normal saline locked 08/12/20 1611   Dressing Status Clean;Dry; Intact 08/12/20 1611   Dressing Intervention New 08/12/20 1516     Mobility: Requires assistance * 1  Pending ED orders: none  Present condition: pt resting on cot with niece at bedside. Pt respirations unlabored.      Electronically signed by Ritesh Rodney RN on 8/12/2020 at 6:48 PM       [address] Garay RN  08/12/20 [id number]

## 2020-08-12 NOTE — ED TRIAGE NOTES
Pt arrives to ED c/o nausea, vomiting and diarrhea for the last 2 days. Pt states that she is able to eat and has an appetite but vomits small amounts after eating. Pt states she has been having diarrhea but denies any blood in her stool. Pt states that she is also nauseas. Pt abdomen is tender and distended. Pt states when she is having pain it is a 10/10. Pt shows no signs of distress, EKG complete, monitor applied.
Discharged

## 2020-08-12 NOTE — ED NOTES
Pt placed on 2L nasal canula for 02 saturations 90-91% on room air.       Linda KOHLER RN  08/12/20 [id number]

## 2020-08-12 NOTE — CONSULTS
[address] Surgery History & Physical  Dr. Ashley Lomas Name: Roman Rick  MRN: [id number]  YOB: 1933  Date of evaluation: 8/12/2020  Primary Care Physician: Jeff Rosales MD    Patient evaluated at the request of Dr. Toya Thomas  Reason for evaluation: Nausea vomiting diarrhea    IMPRESSIONS:  1. Nausea vomiting and diarrhea. Enteritis versus partial SBO  2. Reducible incisional hernia at old colostomy site  3. History of stage IV breast cancer. Recent bone scan stable bone mets. History of hepatic metastasis. 4.   New splenic lesions  5. Chronic steroid use  PLAN:  1. CT scan reviewed. Dilated loops of small bowel without discrete transition point. Reducible incisional hernia. Nausea vomiting combined with diarrhea suggest enteritis less likely mechanical obstruction. 2. 2.  Discussed with Dr. Gato Chu recommend n.p.o., bowel rest IV hydration and antiemetics 3.  GI panel  4. Patient wishes to avoid surgery. Conservative management. 5.  New splenic lesions may be metastatic disease. May warrant PET/CT outpatient. 6.  Recommend follow-up x-ray in a.m.  7.  Recommend hold Plavix for now            SUBJECTIVE:  History of Chief Complaint:     Ursula Jeong is well-known to me from previous medical care. I have not seen her since 2018. She had a colostomy for diverticular disease and underwent a takedown at that time. She had a symptomatic parastomal hernia which required repair and she underwent takedown at that time and repair of the hernia. 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 She underwent treatment under Dr. Yaya miller. She had a bone scan earlier this year that showed stable to decreased disease in the sacrum and pelvis. She denies any fever or chills. She had onset of nausea vomiting and diarrhea since yesterday.   Both her emesis and diarrhea looks like undigested food. She states her diarrhea was dark but nonbloody. CT noted as below. Distended loops of small bowel without transition point. Left-sided incisional hernia at ostomy site clinically reducible. Past Medical History   has a past medical history of Anxiety, Blind right eye, Breast cancer metastasized to liver [facility]), Carotid stenosis, Colostomy in place [facility]), Degenerative arthritis of cervical spine, GERD (gastroesophageal reflux disease), Hypercholesteremia, Hypoestrogenism, Hypothyroidism, Lumbago, Lumbosacral spinal stenosis, MDRO (multiple drug resistant organisms) resistance, Personal history of cardiac dysrhythmia, Sigmoid diverticulosis, Spondylolisthesis of lumbosacral region, Steroid-induced hyperglycemia, Subclavian artery stenosis ([address] Superior and Inferior Pubic ramus fracture, right, closed, initial encounter ([address] SVT (supraventricular tachycardia) ([address] Temporal arteritis ([address] Vertebral artery occlusion, and Vitamin D deficiency. Past Surgical History   has a past surgical history that includes Dilation and curettage of uterus; Cholecystectomy (1/03); Cardiac catheterization (5/07); Cataract removal (right 1/08; left 2/08); subclavian / pulmonary shunt (2002); Colonoscopy (11/06); Upper gastrointestinal endoscopy (11/06); Hysterectomy; eye surgery; other surgical history (05/27/2016); Eye surgery (Right, 11/06/2017); pr office/outpt visit,procedure only (N/A, 9/4/2018); colostomy (09/04/2018); Endoscopy, colon, diagnostic; and Eye removal (Right, 03/2017). Medications  Prior to Admission medications    Medication Sig Start Date End Date Taking?  Authorizing Provider   vitamin D (ERGOCALCIFEROL) 1.25 MG (60711 UT) CAPS capsule take 1 capsule by mouth every week 7/5/20  Yes Historical Provider, MD   HYDROcodone-acetaminophen ([place]) 5-325 MG per tablet take 1 tablet by mouth twice a day if needed 6/11/20  Yes Historical Provider, MD   promethazine (PHENERGAN) 25 MG tablet Take 1 tablet by mouth every 6 hours as needed for Nausea (and vomiting) 8/11/20  Yes Heather Kaur MD   simvastatin (ZOCOR) 20 MG tablet Take 1 tablet by mouth nightly 6/30/20  Yes Heather Kaur MD   levothyroxine (SYNTHROID) 88 MCG tablet take 1 tablet by mouth once daily 5/18/20  Yes Heather Kaur MD   sertraline (ZOLOFT) 50 MG tablet take 1 tablet by mouth twice a day 4/28/20  Yes Heather Kaur MD   isosorbide mononitrate (IMDUR) 30 MG extended release tablet take 1 tablet by mouth once daily 4/14/20  Yes Heather Kaur MD   clopidogrel (PLAVIX) 75 MG tablet take 1 tablet by mouth once daily 3/31/20  Yes Heather Kaur MD   Calcium Carbonate-Vitamin D (OYSTER SHELL CALCIUM/D) 500-200 MG-UNIT TABS take 1 tablet by mouth twice a day 3/3/20  Yes Heather Kaur MD   loratadine (RA LORATADINE) 10 MG tablet take 1 tablet by mouth once daily 1/2/20  Yes Heather Kaur MD   metoprolol tartrate (LOPRESSOR) 25 MG tablet take 1/2 tablet by mouth twice a day 12/10/19  Yes Heather Kaur MD   diclofenac sodium 1 % GEL Apply 4 g topically 4 times daily As needed for arthritis 11/5/19  Yes Heather Kaur MD   Diclofenac Sodium 3 % CREA Apply 2 g to affected area 1-2 x daily 10/15/19  Yes Heather Kaur MD   aspirin 325 MG EC tablet Take 1 tablet by mouth daily 6/13/19  Yes Heather Kaur MD   ipratropium-albuterol (DUONEB) 0.5-2.5 (3) MG/3ML SOLN nebulizer solution Inhale 3 mLs into the lungs every 6 hours while awake 6/12/19  Yes Heather Kaur MD   predniSONE (DELTASONE) 1 MG tablet Take 2 mg by mouth daily    Yes Historical Provider, MD   acetaminophen (TYLENOL) 500 MG tablet Take 1,000 mg by mouth every 8 hours as needed for Pain    Yes Historical Provider, MD   Multiple Vitamins-Minerals (CENTRUM SILVER ADULT 50+) TABS Take 1 tablet by mouth daily 12/7/15  Yes Anne Coyle MD   pantoprazole (PROTONIX) 40 MG tablet take 1 tablet by mouth once daily 4/28/20   Yoni Valentine MD       Allergies  is allergic to bactrim [sulfamethoxazole-trimethoprim]; demerol; and pcn [penicillins]. Family History  family history includes Cancer (age of onset: 48) in her son; Cancer (age of onset: 61) in her sister; Cancer (age of onset: 79) in her brother; Cancer (age of onset: 68) in her brother. Social History   reports that she has quit smoking. Her smoking use included cigarettes. She smoked 0.50 packs per day. She has never used smokeless tobacco.   reports no history of alcohol use. reports no history of drug use. Review of Systems:  General Denies any fever or chills  HEENT Denies any diplopia, tinnitus or vertigo  Resp Denies any shortness of breath, cough or wheezing  Cardiac Denies any chest pain, palpitations, claudication or edema  GI poor appetite, nausea, vomiting/regurgitation, diarrhea and Denies any melena, hematochezia, hematemesis or pyrosis   denies any dysuria has some frequency some incontinence  Heme bruises easily and has multiple bruises  Endocrine Denies any history of diabetes or thyroid disease  Neuro Denies any focal motor or sensory deficits    PHYSICAL EXAMINATION  VITALS:  BP (!) 104/50   Pulse 61   Temp 98.4 °F (36.9 °C) (Oral)   Resp 18   Ht 5' (1.524 m)   Wt 138 lb (62.6 kg)   LMP  (LMP Unknown)   SpO2 96%   BMI 26.95 kg/m²   CONSTITUTIONAL: awake, alert, cooperative, no apparent distress, and appears stated age  HEENT: Blind in 1 eye, Head is normocephalic, atraumatic. NECK: Soft, trachea midline and straight  BACK: symmetric and no curvature  LUNGS: Chest expands equally bilaterally upon respiration, no accessory muscle used. Ausculation reveals no wheezes, rales or rhonchi. CARDIOVASCULAR: Heart is regular rate and rhythm without murmur  ABDOMEN: Mild to moderate distention but soft some subjective tenderness but mild without rebound or guarding. Left abdominal wall hernia reducible.   No erythema of the skin.  MUSCULOSKELETAL: Bruising of extremities no edema  NEUROLOGIC: Alert and oriented answering questions appropriately  SKIN: Multiple bruises, no rashes and no jaundice  EXTREMITIES: No edema, warm      LABS:  CBC with Differential:    Lab Results   Component Value Date    WBC 10.1 08/12/2020    RBC 4.43 08/12/2020    RBC 4.23 04/08/2019    HGB 12.4 08/12/2020    HCT 41.9 08/12/2020     08/12/2020    MCV 94.6 08/12/2020    MCH 28.0 08/12/2020    MCHC 29.6 08/12/2020    RDW 17.0 04/08/2019    NRBC 0 08/12/2020    SEGSPCT 80.4 08/12/2020    LYMPHOPCT 21.3 04/08/2019    MONOPCT 6.1 08/12/2020    MONOPCT 9.2 07/23/2018    EOSPCT 0.9 04/08/2019    BASOPCT 0.1 04/08/2019    MONOSABS 0.6 08/12/2020    LYMPHSABS 1.3 08/12/2020    EOSABS 0.0 08/12/2020    BASOSABS 0.0 08/12/2020     CMP:    Lab Results   Component Value Date     08/12/2020    K 4.0 08/12/2020     08/12/2020    CO2 28 08/12/2020    BUN 14 08/12/2020    CREATININE 0.7 08/12/2020    AGRATIO 1.7 07/23/2018    LABGLOM 79 08/12/2020    GLUCOSE 120 08/12/2020    GLUCOSE 103 07/23/2018    PROT 5.6 08/12/2020    LABALBU 3.8 08/12/2020    CALCIUM 8.8 08/12/2020    BILITOT 0.6 08/12/2020    BILITOT NEGATIVE 11/13/2018    ALKPHOS 39 08/12/2020    AST 17 08/12/2020    ALT 10 08/12/2020       RADIOLOGY:        PROCEDURE: CT ABDOMEN PELVIS W IV CONTRAST         CLINICAL INFORMATION: diffuse abd pain, vomiting .         COMPARISON: 4/10/2019         TECHNIQUE: 2-D multiplanar postcontrast images of the chest Isovue-370 IV contrast only no oral contrast.    All CT scans at this facility use dose modulation, iterative reconstruction, and/or weight-based dosing when appropriate to reduce radiation dose to as low as reasonably achievable.         FINDINGS: Lung bases    Lung bases are clear undersurface of the heart is unremarkable. Abdomen pelvis    The stomach is dilated with fluid and gas.     There are numerous dilated air and fluid-filled small bowel loops. A distinct transition is not seen. There is a left ventral hernia which does contain a dilated loop of bowel. This does not definitively appear to be a transition. The most distal small    bowel loops appear to be normal caliber. There is an anastomosis in the right lower quadrant of the pelvis which could represent transition. The terminal ileum is normal. The colon is completely decompressed. No free air is seen. There is minimal perihepatic ascites. Liver has a lobulated contour suggesting cirrhosis. Chronic low density lesion in the spleen measuring less than 2 cm possibly a hemangioma. There are multiple subcentimeter low-density lesions in the spleen which were not seen previously. Metastatic lesions are not excluded. Adrenals are normal.    Kidneys enhance symmetrically. Bosniak 1 cyst lower pole left kidney measuring 2.8 cm. Heavy calcific atherosclerosis of the aorta.              There is a fracture of the right superior pubic ramus with expansion and sclerosis. This could represent a pathologic fracture. Is also fracture of the inferior pubic ramus on the right which appears chronic it was not present on the previous CT scan. L5-S1 spondylolysis. No other suspicious bone lesions are identified.              Impression    1. Findings of distal small bowel obstruction without definite transition point. Left ventral hernia containing a dilated loop of bowel but this does not definitively appear to be the obstruction point. 2. Minimal ascites. 3. Chronic Fractures of the superior and inferior pubic rami on the right.                   **This report has been created using voice recognition software.  It may contain minor errors which are inherent in voice recognition technology. **         Final report electronically signed by Dr. Norma Perla on 8/12/2020 4:42 PM                 Temo Issa MD  Electronically signed 8/12/2020 at 5:22 PM

## 2020-08-12 NOTE — PROGRESS NOTES
Pharmacy Medication History Note      List of current medications patient is taking is complete. Source of information: patient    Changes made to medication list:  Medications removed (include reason, ex. therapy complete or physician discontinued):  Zofran - not taking      Medications added/doses adjusted: Other notes (ex. Recent course of antibiotics, Coumadin dosing):    Denies use of other OTC or herbal medications.       Allergies reviewed      Electronically signed by Joslyn Moss [facility] on 8/12/2020 at 4:02 PM

## 2020-08-12 NOTE — ED PROVIDER NOTES
[address] [facility] [address] [address] EMERGENCY DEPT  EMERGENCY DEPARTMENT ENCOUNTER      Pt Name: Isela Foster  MRN: [id number]  Srinathgfguera 5/29/1933  Date of evaluation: 8/12/2020  Provider: Adamson MD    [address] [place] [address]       Chief Complaint   Patient presents with    Nausea    Emesis    Diarrhea         HISTORY OF PRESENT ILLNESS   (Location/Symptom, Timing/Onset, Context/Setting, Quality, Duration, Modifying Factors, Severity)  Note limiting factors. Is 49-year-old female with history of breast cancer and liver metastases, who presents for evaluation of vomiting and diarrhea since yesterday. Patient reports her symptoms started yesterday. Patient was prescribed Phenergan yesterday and took half a tablet without improvement in her symptoms. Patient reports that she has been having dark stools and she is currently taking iron supplements. Patient denies dark or red emesis. Patient has not been able to tolerate anything p.o. today given her nausea and vomiting. Patient has a history of          Nursing Notes were reviewed. REVIEW OF SYSTEMS    (2-9 systems for level 4, 10 or more for level 5)     Review of Systems   All other systems reviewed and are negative. Except as noted above the remainder of the review of systems was reviewed and negative.        PAST MEDICAL HISTORY     Past Medical History:   Diagnosis Date    Anxiety     Blind right eye     Breast cancer metastasized to liver ([address] 05/10/2016    Liver lesion noted     Carotid stenosis      Left ICA 25%    Colostomy in place [facility]) 05/27/2016    Degenerative arthritis of cervical spine 5/07    GERD (gastroesophageal reflux disease) 11/06    Hypercholesteremia     Hypoestrogenism     Hypothyroidism     Lumbago     Lumbosacral spinal stenosis 05/2019    MDRO (multiple drug resistant organisms) resistance 2008    pt stated cleared    Personal history of cardiac dysrhythmia     Sigmoid diverticulosis 8/04    Spondylolisthesis of lumbosacral region 8/04    Steroid-induced hyperglycemia     Subclavian artery stenosis (HCC)     left    Superior and Inferior Pubic ramus fracture, right, closed, initial encounter [facility] 05/21/2019    SVT (supraventricular tachycardia) [facility] 6/07    Temporal arteritis [facility]     Vertebral artery occlusion     left    Vitamin D deficiency 06/2017         SURGICAL HISTORY       Past Surgical History:   Procedure Laterality Date    CARDIAC CATHETERIZATION  5/07    CATARACT REMOVAL  right 1/08; left 2/08    CHOLECYSTECTOMY  1/03    COLONOSCOPY  11/06    COLOSTOMY  09/04/2018    REVERSAL OF COLOSTOMY    DILATION AND CURETTAGE OF UTERUS      ENDOSCOPY, COLON, DIAGNOSTIC      EYE REMOVAL Right 03/2017    EYE SURGERY      EYE SURGERY Right 11/06/2017    Dr Lopez in [address]      partial    OTHER SURGICAL HISTORY  05/27/2016    robotic assisted laparoscopic anterior colon resection and end colostomy    TN OFFICE/OUTPT VISIT,PROCEDURE ONLY N/A 9/4/2018    COLOSTOMY REVERSAL AND PARASTOMAL HERNIA REPAIR performed by Timmy Dave MD at [address] [facility]  2002    UPPER GASTROINTESTINAL ENDOSCOPY  11/06         CURRENT MEDICATIONS       Previous Medications    ACETAMINOPHEN (TYLENOL) 500 MG TABLET    Take 1,000 mg by mouth every 8 hours as needed for Pain     ASPIRIN 325 MG EC TABLET    Take 1 tablet by mouth daily    CALCIUM CARBONATE-VITAMIN D (OYSTER SHELL CALCIUM/D) 500-200 MG-UNIT TABS    take 1 tablet by mouth twice a day    CLOPIDOGREL (PLAVIX) 75 MG TABLET    take 1 tablet by mouth once daily    DICLOFENAC SODIUM 1 % GEL    Apply 4 g topically 4 times daily As needed for arthritis    DICLOFENAC SODIUM 3 % CREA    Apply 2 g to affected area 1-2 x daily    HYDROCODONE-ACETAMINOPHEN (NORCO) 5-325 MG PER TABLET    take 1 tablet by mouth twice a day if needed    IPRATROPIUM-ALBUTEROL (DUONEB) 0.5-2.5 (3) MG/3ML SOLN NEBULIZER SOLUTION    Inhale 3 mLs into the lungs every 6 hours while awake    ISOSORBIDE MONONITRATE (IMDUR) 30 MG EXTENDED RELEASE TABLET    take 1 tablet by mouth once daily    LEVOTHYROXINE (SYNTHROID) 88 MCG TABLET    take 1 tablet by mouth once daily    LORATADINE (RA LORATADINE) 10 MG TABLET    take 1 tablet by mouth once daily    METOPROLOL TARTRATE (LOPRESSOR) 25 MG TABLET    take 1/2 tablet by mouth twice a day    MULTIPLE VITAMINS-MINERALS (CENTRUM SILVER ADULT 50+) TABS    Take 1 tablet by mouth daily    PANTOPRAZOLE (PROTONIX) 40 MG TABLET    take 1 tablet by mouth once daily    PREDNISONE (DELTASONE) 1 MG TABLET    Take 2 mg by mouth daily     PROMETHAZINE (PHENERGAN) 25 MG TABLET    Take 1 tablet by mouth every 6 hours as needed for Nausea (and vomiting)    SERTRALINE (ZOLOFT) 50 MG TABLET    take 1 tablet by mouth twice a day    SIMVASTATIN (ZOCOR) 20 MG TABLET    Take 1 tablet by mouth nightly    VITAMIN D (ERGOCALCIFEROL) 1.25 MG (49157 UT) CAPS CAPSULE    take 1 capsule by mouth every week       ALLERGIES     Bactrim [sulfamethoxazole-trimethoprim]; Demerol; and Pcn [penicillins]    FAMILY HISTORY       Family History   Problem Relation Age of Onset    Cancer Sister 61        breast    Cancer Brother 79        lung    Cancer Brother 68        colon    Cancer Son 48        lung          SOCIAL HISTORY       Social History     Socioeconomic History    Marital status:       Spouse name: None    Number of children: 0    Years of education: 10th grade     Highest education level: None   Occupational History    None   Social Needs    Financial resource strain: None    Food insecurity     Worry: None     Inability: None    Transportation needs     Medical: None     Non-medical: None   Tobacco Use    Smoking status: Former Smoker     Packs/day: 0.50     Types: Cigarettes    Smokeless tobacco: Never Used    Tobacco comment: trying to quit   Substance and Sexual Activity    Alcohol use: No    Drug use: No    Sexual activity: Never Capillary Refill: Capillary refill takes less than 2 seconds. Findings: No erythema or rash. Neurological:      Mental Status: She is alert and oriented to person, place, and time. Cranial Nerves: No cranial nerve deficit. Sensory: No sensory deficit. Motor: No abnormal muscle tone. Coordination: Coordination normal.   Psychiatric:         Behavior: Behavior normal.         Thought Content: Thought content normal.         Judgment: Judgment normal.         DIAGNOSTIC RESULTS     EKG: All EKG's are interpreted by the Emergency Department Physician who either signs or Co-signs this chart in the absence of a cardiologist.    EKG demonstrates normal sinus rhythm with nonspecific ST changes. Patient has T wave inversions in V2 through V5, aVL. This is unchanged from previous EKG in May 2019. There are no new ST changes that are concerning for ischemia. RADIOLOGY:   Non-plain film images such as CT, Ultrasound and MRI are read by the radiologist. Plain radiographic images are visualized and preliminarily interpreted by the emergency physician with the below findings:    Interpretation per the Radiologist below, if available at the time of this note:    XR CHEST PORTABLE   Final Result   No acute findings               **This report has been created using voice recognition software. It may contain minor errors which are inherent in voice recognition technology. **      Final report electronically signed by Dr. Renetta Bradley on 8/12/2020 6:11 PM      CT ABDOMEN PELVIS W IV CONTRAST Additional Contrast? None   Final Result   1. Findings of distal small bowel obstruction without definite transition point. Left ventral hernia containing a dilated loop of bowel but this does not definitively appear to be the obstruction point. 2. Minimal ascites. 3. Chronic Fractures of the superior and inferior pubic rami on the right.             **This report has been created using voice recognition dilated loops of small bowel. Labs are largely unremarkable. Patient has normal creatinine, normal electrolytes. Patient's had multiple episodes of vomiting during her course in the ED. She was given Zofran which helped with her symptoms. Patient evaluated by surgery, Dr. Stephanie Forrester who plans to place a NG tube and have the patient admitted to the hospitalist service. Patient hemodynamically stable at time of admission. Patient has a nonsurgical abdomen at time of admission. REASSESSMENT     ED Course as of Aug 12 1830   Wed Aug 12, 2020   1700 Discussed patient with Dr. Stephanie Forrester, general surgery. She does not believe the patient is a small bowel obstruction but will come evaluate the patient. [FK]      ED Course User Index  [FK] Heather Ruano MD         CRITICAL CARE TIME       CONSULTS:  None    PROCEDURES:  Unless otherwise noted below, none     Procedures      FINAL IMPRESSION      1. SBO (small bowel obstruction) ([address]          DISPOSITION/PLAN   DISPOSITION Admitted 08/12/2020 05:45:28 PM      PATIENT REFERRED TO:  Dain Bailey MD  [address] [place] [address]            DISCHARGE MEDICATIONS:  New Prescriptions    No medications on file     Controlled Substances Monitoring:     RX Monitoring 1/2/2020   Attestation -   Periodic Controlled Substance Monitoring No signs of potential drug abuse or diversion identified.    Chronic Pain > 80 MEDD -       (Please note that portions of this note were completed with a voice recognition program.  Efforts were made to edit the dictations but occasionally words are mis-transcribed.)    Cho MD (electronically signed)  Attending Emergency Physician            Heather Ruano MD  08/12/20 [id number]

## 2020-08-13 ENCOUNTER — APPOINTMENT (OUTPATIENT)
Dept: GENERAL RADIOLOGY | Age: 85
DRG: 392 | End: 2020-08-13
Payer: MEDICARE

## 2020-08-13 PROBLEM — R19.7 ABDOMINAL PAIN, VOMITING, AND DIARRHEA: Status: ACTIVE | Noted: 2020-08-13

## 2020-08-13 PROBLEM — K56.609 SBO (SMALL BOWEL OBSTRUCTION) (HCC): Status: ACTIVE | Noted: 2018-12-23

## 2020-08-13 PROBLEM — R11.10 ABDOMINAL PAIN, VOMITING, AND DIARRHEA: Status: ACTIVE | Noted: 2020-08-13

## 2020-08-13 PROBLEM — S32.591A PUBIC RAMUS FRACTURE, RIGHT, CLOSED, INITIAL ENCOUNTER (HCC): Status: RESOLVED | Noted: 2019-05-21 | Resolved: 2020-08-13

## 2020-08-13 PROBLEM — R10.9 ABDOMINAL PAIN, VOMITING, AND DIARRHEA: Status: ACTIVE | Noted: 2020-08-13

## 2020-08-13 PROBLEM — Z85.3 HX OF BREAST CANCER: Status: ACTIVE | Noted: 2020-08-13

## 2020-08-13 PROBLEM — Z79.899 ON ANTINEOPLASTIC CHEMOTHERAPY: Status: RESOLVED | Noted: 2019-05-25 | Resolved: 2020-08-13

## 2020-08-13 LAB
ADENOVIRUS F 40 41 PCR: NOT DETECTED
ASTROVIRUS PCR: NOT DETECTED
BACTERIA: ABNORMAL /HPF
BILIRUBIN URINE: NEGATIVE
BLOOD, URINE: ABNORMAL
CAMPYLOBACTER PCR: NOT DETECTED
CASTS 2: ABNORMAL /LPF
CASTS UA: ABNORMAL /LPF
CHARACTER, URINE: CLEAR
CLOSTRIDIUM DIFFICILE, PCR: NOT DETECTED
COLOR: YELLOW
CRYPTOSPORIDIUM PCR: NOT DETECTED
CRYSTALS, UA: ABNORMAL
CYCLOSPORA CAYETANENSIS PCR: NOT DETECTED
E COLI 0157 PCR: NORMAL
E COLI ENTEROAGGREGATIVE PCR: NOT DETECTED
E COLI ENTEROPATHOGENIC PCR: NOT DETECTED
E COLI ENTEROTOXIGENIC PCR: NOT DETECTED
E COLI SHIGA LIKE TOXIN PCR: NOT DETECTED
E COLI SHIGELLA/ENTEROINVASIVE PCR: NOT DETECTED
E HISTOLYTICA GI FILM ARRAY: NOT DETECTED
EPITHELIAL CELLS, UA: ABNORMAL /HPF
GIARDIA LAMBLIA PCR: NOT DETECTED
GLUCOSE URINE: NEGATIVE MG/DL
KETONES, URINE: NEGATIVE
LEUKOCYTE ESTERASE, URINE: NEGATIVE
MISCELLANEOUS 2: ABNORMAL
NITRITE, URINE: NEGATIVE
NOROVIRUS GI GII PCR: NOT DETECTED
PH UA: 5.5 (ref 5–9)
PLESIOMONAS SHIGELLOIDES PCR: NOT DETECTED
PROTEIN UA: NEGATIVE
RBC URINE: ABNORMAL /HPF
RENAL EPITHELIAL, UA: ABNORMAL
ROTAVIRUS A PCR: NOT DETECTED
SALMONELLA PCR: NOT DETECTED
SAPOVIRUS PCR: NOT DETECTED
SPECIFIC GRAVITY, URINE: 1.02 (ref 1–1.03)
UROBILINOGEN, URINE: 0.2 EU/DL (ref 0–1)
VIBRIO CHOLERAE PCR: NOT DETECTED
VIBRIO PCR: NOT DETECTED
WBC UA: ABNORMAL /HPF
YEAST: ABNORMAL
YERSINIA ENTEROCOLITICA PCR: NOT DETECTED

## 2020-08-13 PROCEDURE — 74019 RADEX ABDOMEN 2 VIEWS: CPT

## 2020-08-13 PROCEDURE — C9113 INJ PANTOPRAZOLE SODIUM, VIA: HCPCS | Performed by: EMERGENCY MEDICINE

## 2020-08-13 PROCEDURE — 51798 US URINE CAPACITY MEASURE: CPT

## 2020-08-13 PROCEDURE — 81001 URINALYSIS AUTO W/SCOPE: CPT

## 2020-08-13 PROCEDURE — 6370000000 HC RX 637 (ALT 250 FOR IP): Performed by: EMERGENCY MEDICINE

## 2020-08-13 PROCEDURE — 94760 N-INVAS EAR/PLS OXIMETRY 1: CPT

## 2020-08-13 PROCEDURE — 2700000000 HC OXYGEN THERAPY PER DAY

## 2020-08-13 PROCEDURE — 6360000002 HC RX W HCPCS: Performed by: EMERGENCY MEDICINE

## 2020-08-13 PROCEDURE — 94640 AIRWAY INHALATION TREATMENT: CPT

## 2020-08-13 PROCEDURE — 2580000003 HC RX 258: Performed by: EMERGENCY MEDICINE

## 2020-08-13 PROCEDURE — 1200000000 HC SEMI PRIVATE

## 2020-08-13 PROCEDURE — 99233 SBSQ HOSP IP/OBS HIGH 50: CPT | Performed by: SURGERY

## 2020-08-13 RX ORDER — 0.9 % SODIUM CHLORIDE 0.9 %
500 INTRAVENOUS SOLUTION INTRAVENOUS ONCE
Status: COMPLETED | OUTPATIENT
Start: 2020-08-13 | End: 2020-08-13

## 2020-08-13 RX ADMIN — ONDANSETRON 4 MG: 2 INJECTION INTRAMUSCULAR; INTRAVENOUS at 12:05

## 2020-08-13 RX ADMIN — PREDNISONE 2 MG: 1 TABLET ORAL at 09:31

## 2020-08-13 RX ADMIN — METOPROLOL TARTRATE 12.5 MG: 25 TABLET, FILM COATED ORAL at 09:31

## 2020-08-13 RX ADMIN — DEXTROSE AND SODIUM CHLORIDE: 5; 900 INJECTION, SOLUTION INTRAVENOUS at 12:37

## 2020-08-13 RX ADMIN — CETIRIZINE HYDROCHLORIDE 10 MG: 10 TABLET, FILM COATED ORAL at 09:31

## 2020-08-13 RX ADMIN — IPRATROPIUM BROMIDE AND ALBUTEROL SULFATE 3 ML: .5; 3 SOLUTION RESPIRATORY (INHALATION) at 13:10

## 2020-08-13 RX ADMIN — PANTOPRAZOLE SODIUM 40 MG: 40 INJECTION, POWDER, FOR SOLUTION INTRAVENOUS at 09:32

## 2020-08-13 RX ADMIN — LEVOTHYROXINE SODIUM 88 MCG: 88 TABLET ORAL at 09:31

## 2020-08-13 RX ADMIN — SERTRALINE HYDROCHLORIDE 50 MG: 50 TABLET, FILM COATED ORAL at 09:31

## 2020-08-13 RX ADMIN — PHENOL 1 SPRAY: 1.4 SPRAY ORAL at 12:24

## 2020-08-13 RX ADMIN — ACETAMINOPHEN 650 MG: 325 TABLET ORAL at 12:05

## 2020-08-13 RX ADMIN — ISOSORBIDE MONONITRATE 30 MG: 30 TABLET ORAL at 09:31

## 2020-08-13 RX ADMIN — Medication 10 ML: at 20:21

## 2020-08-13 RX ADMIN — SODIUM CHLORIDE 500 ML: 9 INJECTION, SOLUTION INTRAVENOUS at 17:01

## 2020-08-13 RX ADMIN — DEXTROSE AND SODIUM CHLORIDE: 5; 900 INJECTION, SOLUTION INTRAVENOUS at 22:20

## 2020-08-13 RX ADMIN — IPRATROPIUM BROMIDE AND ALBUTEROL SULFATE 3 ML: .5; 3 SOLUTION RESPIRATORY (INHALATION) at 08:39

## 2020-08-13 RX ADMIN — IPRATROPIUM BROMIDE AND ALBUTEROL SULFATE 3 ML: .5; 3 SOLUTION RESPIRATORY (INHALATION) at 18:21

## 2020-08-13 ASSESSMENT — PAIN DESCRIPTION - LOCATION
LOCATION: ABDOMEN
LOCATION: THROAT

## 2020-08-13 ASSESSMENT — PAIN DESCRIPTION - ONSET: ONSET: ON-GOING

## 2020-08-13 ASSESSMENT — PAIN DESCRIPTION - PAIN TYPE
TYPE: ACUTE PAIN
TYPE: ACUTE PAIN

## 2020-08-13 ASSESSMENT — PAIN DESCRIPTION - ORIENTATION: ORIENTATION: MID;LOWER

## 2020-08-13 ASSESSMENT — PAIN SCALES - GENERAL
PAINLEVEL_OUTOF10: 8
PAINLEVEL_OUTOF10: 9
PAINLEVEL_OUTOF10: 4
PAINLEVEL_OUTOF10: 0
PAINLEVEL_OUTOF10: 0
PAINLEVEL_OUTOF10: 9

## 2020-08-13 ASSESSMENT — PAIN - FUNCTIONAL ASSESSMENT: PAIN_FUNCTIONAL_ASSESSMENT: ACTIVITIES ARE NOT PREVENTED

## 2020-08-13 ASSESSMENT — PAIN DESCRIPTION - PROGRESSION: CLINICAL_PROGRESSION: NOT CHANGED

## 2020-08-13 ASSESSMENT — PAIN DESCRIPTION - FREQUENCY: FREQUENCY: INTERMITTENT

## 2020-08-13 ASSESSMENT — PAIN DESCRIPTION - DESCRIPTORS: DESCRIPTORS: CRAMPING

## 2020-08-13 NOTE — PROGRESS NOTES
Pt admitted to  8AB17 via via cart/stretcher from ED. Complaints: Abdominal pain. IV site free of s/s of infection or infiltration. Vital signs obtained. Assessment and data collection initiated. Two nurse skin assessment performed by Shayy Valero RN and Avis Abel RN. Oriented to room. Policies and procedures for 8AB explained. All questions answered with no further questions at this time. Fall prevention and safety brochure discussed with patient. Bed alarm on. Call light in reach. Oriented to room. Brant Leonard RN 8/12/2020 11:16 PM     Explained patients right to have family, representative or physician notified of their admission. Patient has Declined for physician to be notified. Patient has Declined for family/representative to be notified. Patient would like family notified once per shift?  No.

## 2020-08-13 NOTE — H&P
[address] [id number]                              HISTORY AND PHYSICAL    PATIENT NAME: Hero Qureshi                      :        1933  MED REC NO:   [id number]                           ROOM:  ACCOUNT NO:   [de-identified]                           ADMIT DATE: 2020  PROVIDER:     Anel Benson. Carol Franco M.D.    [address]:  The patient is an 19-year-old white female, presented  here because of nausea, vomiting, diarrhea since two nights ago. HISTORY OF PRESENT ILLNESS:  The patient is an 19-year-old white female  known to me through the office being her primary care provider. The  patient has a known history of breast cancer and had a colostomy that  subsequently had reversal by Dr. Miles Godoy. The patient states that two  nights ago started to have nausea, vomiting, and diarrhea about three to  five times and today had persistently vomited four times and three times  more in the emergency room. Diarrhea about three to four times today of  watery, nonmucus, and nonblood streak. The patient also having  abdominal pain intermittently. She has not been eating since yesterday. She had called the office yesterday and was called in Phenergan, which  she took it half of the tablet. The patient denies any melena or  hematochezia. The patient, however, admits that she had some  intermittent abdominal pain since she had the reversal of her colostomy. The patient has a history of breast cancer with metastasis to the liver,  being followed by Dr. Paulette Eldridge, and the patient has been doing well with  that. REVIEW OF SYSTEMS:  Denies any headache, no dizziness, no ear pain, no  sore throat, cough, cold. No shortness of breath or chest pain. Positive for nausea, vomiting, abdominal pain, diarrhea. Denies any  melena or hematochezia. Denies any urinary symptoms, dysuria,  frequency, or hematuria.   The rest of the review of systems is negative. 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 PAST SURGICAL HISTORY:  Includes cholecystectomy, colostomy and  subsequently had reversal of colostomy, cataract surgery, heart  catheterization, hysterectomy, subclavian pulmonary shunt. FAMILY HISTORY:  Significant for breast cancer, lung cancer, and colon  cancer. SOCIAL HISTORY:  The patient lives alone; however, she has no children  nor been . The patient has a niece that is supportive of her. Denies any alcohol or substance abuse. MEDICATIONS:  See nursing notes. ALLERGIES:  To DEMEROL and BACTRIM. PHYSICAL EXAMINATION:  GENERAL:  When I have seen the patient, the patient was seen in the  emergency room. Alert, active,cooperative, not in distress. VITAL SIGNS:  Blood pressure is 101/88, temperature is 98.4,  respirations 16, pulse 62, saturation 93%. HEENT:  Head normocephalic, atraumatic. Oral mucosa is moist.  No  tonsillopharyngeal congestion. NECK:  Supple. LUNGS:  Good air exchange. No rales or wheezes. HEART:  Regular rate and rhythm. ABDOMEN:  Soft, slightly globular, hyperactive bowel. There is diffuse  tenderness. There is no rebound tenderness. BACK:  Showed no CVA tenderness. EXTREMITIES:  No edema. Full pulses. NEUROLOGIC:  Motor, sensory, and cranial nerves all intact. ASSESSMENT:  1. Abdominal pain with diarrhea, cannot rule out enteritis versus  partial bowel obstruction. 2.  Ventral hernia from previous surgeries and colostomies. 3.  History of stage IV breast cancer with hepatic metastasis. 4.  New splenic lesion.   5. Chronic steroid use secondary to temporal arteritis. 6.  Right eye enucleation. 7.  Hypothyroidism. 8.  GERD. PLAN:  The patient was referred to General Surgery, Dr. Pedro Real, who has  been her general surgeon in the past.  Dr. Pedro Real did not think that she is  not having bowel obstruction; however, we will now treat conservatively  with bowel rest, NG tube, and IV fluid support. The patient will be  followed up accordingly. Further orders based on the patient's clinical  course, laboratory, and x-ray findings. CYNTHIA Oates M.D.    D: 08/12/2020 20:43:51       T: 08/12/2020 22:10:31     [email or web address]  Job#: [id number]     Doc#: [id number]    CC:

## 2020-08-13 NOTE — FLOWSHEET NOTE
Notification: Patient has not peed since around 0900 this morning. She has D5.9 running at 100ml/hr. Only output is about 50mL from NG.     Response: Bladder scan; bolus 500mL of .9; call with update

## 2020-08-13 NOTE — PROGRESS NOTES
Alvin Jovel MD  Daily Progress Note  Pt Name: Sabrina Lopez Record Number: [id number]  Date of Birth 5/29/1933   Today's Date: 8/13/2020  Chief complaint: I feel better. When can I get this tube out of my nose  ASSESSMENT:   1. Hospital day # 1   2. Nausea vomiting diarrhea. 3. Reducible abdominal wall hernia  4. History of metastatic breast cancer  5. New splenic lesions  6.  has a past medical history of Anxiety, Blind right eye, Breast cancer metastasized to liver [facility]), Carotid stenosis, Colostomy in place [facility]), Degenerative arthritis of cervical spine, GERD (gastroesophageal reflux disease), Hypercholesteremia, Hypoestrogenism, Hypothyroidism, Lumbago, Lumbosacral spinal stenosis, MDRO (multiple drug resistant organisms) resistance, Personal history of cardiac dysrhythmia, Sigmoid diverticulosis, Spondylolisthesis of lumbosacral region, Steroid-induced hyperglycemia, Subclavian artery stenosis (HCC), Superior and Inferior Pubic ramus fracture, right, closed, initial encounter ([facility] [address] SVT (supraventricular tachycardia) ([address] Temporal arteritis ([address] Vertebral artery occlusion, and Vitamin D deficiency. RECOMMENDATIONS:   1. IV hydration  2. Analgesics and antiemetics as needed  3. Continue NG tube  4. DVT prophylaxis per primary service. Patient has SCDs. Okay for Lovenox  5. Or chemical prophylaxis from a surgical standpoint increase activity as tolerated  6. Follow-up abdominal imaging. 7. 7.  GI panel negative  SUBJECTIVE:   Hema Ramos is doing better. She continues to have loose stools. NG tube output still somewhat feculent though output decreasing. Abdomen is soft, non-tender, no guarding. She is asking when NG can come out. She states she wants to avoid surgery unless absolutely necessary.   MEDICATIONS   Scheduled Meds:   oxymetazoline  2 spray Each Nostril Once    lidocaine   Topical Once    [Held by provider] clopidogrel  75 mg Oral Daily    ipratropium-albuterol  3 mL Inhalation Q6H WA    isosorbide mononitrate  30 mg Oral Daily    levothyroxine  88 mcg Oral Daily    cetirizine  10 mg Oral Daily    metoprolol tartrate  12.5 mg Oral BID    pantoprazole  40 mg Intravenous Daily    predniSONE  2 mg Oral Daily    sertraline  50 mg Oral Daily    sodium chloride flush  10 mL Intravenous 2 times per day     Continuous Infusions:   dextrose 5 % and 0.9 % NaCl 100 mL/hr at 20 2344     PRN Meds:.sodium chloride flush, acetaminophen **OR** acetaminophen, promethazine **OR** ondansetron  OBJECTIVE   CURRENT VITALS:  height is 5' (1.524 m) and weight is 133 lb 13.1 oz (60.7 kg). Her oral temperature is 97.9 °F (36.6 °C). Her blood pressure is 138/55 (abnormal) and her pulse is 58. Her respiration is 18 and oxygen saturation is 96%.    Temperature Range (24h):Temp: 97.9 °F (36.6 °C) Temp  Av.1 °F (36.7 °C)  Min: 97.9 °F (36.6 °C)  Max: 98.4 °F (36.9 °C)  BP Range (66O): Systolic (25BUZ), NQU:340 , Min:101 , LMR:354     Diastolic (54SKL), PMT:37, Min:48, Max:88    Pulse Range (24h): Pulse  Av.6  Min: 58  Max: 77  Respiration Range (24h): Resp  Av.7  Min: 13  Max: 21  Current Pulse Ox (24h):  SpO2: 96 %  Pulse Ox Range (24h):  SpO2  Av.2 %  Min: 93 %  Max: 96 %  Oxygen Amount and Delivery: O2 Flow Rate (L/min): 1 L/min  Incentive Spirometry Tx:            GENERAL: alert, no distress  LUNGS: clear  HEART:normal heart rate  ABDOMEN:soft hernia reduced, bowel sounds normoactive  EXTREMITY: no cyanosis, clubbing or edema , multiple bruises  In: 355.9 [I.V.:355.9]  Out: 300      Date 20 0000 - 20 2359   Shift 0356-6763 6208-5162 [id number] 24 Hour Total   INTAKE   I.V.(mL/kg) 355.9(5.9)   355.9(5.9)   Shift Total(mL/kg) 355.9(5.9)   355.9(5.9)   OUTPUT   Emesis/NG output(mL/kg) 300(4.9)   300(4.9)   Shift Total(mL/kg) 300(4.9)   300(4.9)   Weight (kg) 60.7 60.7 60.7 60.7     LABS     Recent Labs     20  1505   WBC 10.1 HGB 12.4   HCT 41.9         K 4.0      CO2 28   BUN 14   CREATININE 0.7   CALCIUM 8.8      No results for input(s): PTT, INR in the last 72 hours. Invalid input(s): PT  Recent Labs     08/12/20  1505   AST 17   ALT 10*   BILITOT 0.6   LIPASE 12.6     Recent Labs     08/12/20  1512   TROPONINT < 0.010         RADIOLOGY     XR CHEST PORTABLE   Final Result   No acute findings               **This report has been created using voice recognition software. It may contain minor errors which are inherent in voice recognition technology. **      Final report electronically signed by Dr. Tabatha Gutierrez on 8/12/2020 6:11 PM      CT ABDOMEN PELVIS W IV CONTRAST Additional Contrast? None   Final Result   1. Findings of distal small bowel obstruction without definite transition point. Left ventral hernia containing a dilated loop of bowel but this does not definitively appear to be the obstruction point. 2. Minimal ascites. 3. Chronic Fractures of the superior and inferior pubic rami on the right. **This report has been created using voice recognition software. It may contain minor errors which are inherent in voice recognition technology. **      Final report electronically signed by Dr. Wilmer Eddy on 8/12/2020 4:42 PM      XR ABDOMEN (2 VIEWS)    (Results Pending)       Electronically signed by Vidal Sanabria MD on 8/13/2020 at 7:08 AM

## 2020-08-13 NOTE — PLAN OF CARE
Problem: Impaired respiratory status  Goal: Clear lung sounds  Outcome: Ongoing  Note: Duoneb Q6 WA to improve breath sounds.

## 2020-08-13 NOTE — FLOWSHEET NOTE
Notification: 520ml in bladder per scan; 500mL bolus given; urinated 500mL's    Response: Continue to monitor.

## 2020-08-13 NOTE — ED NOTES
Pt resting on cot with eyes closed at this time. Pt respirations easy and unlabored.       Clemencia EDDY RN  08/12/20 [id number]

## 2020-08-13 NOTE — PROGRESS NOTES
 Anxiety [F41.9] 06/29/2016     Priority: Medium    Hypothyroidism due to acquired atrophy of thyroid [E03.4] 03/03/2016     Priority: Medium    GERD (gastroesophageal reflux disease) [K21.9]      Priority: Medium       Plan:     Discussed plans with the nursing staff  2 view abdomen xray    Medications, Laboratories and Imaging results:    Scheduled Meds:   oxymetazoline  2 spray Each Nostril Once    lidocaine   Topical Once    [Held by provider] clopidogrel  75 mg Oral Daily    ipratropium-albuterol  3 mL Inhalation Q6H WA    isosorbide mononitrate  30 mg Oral Daily    levothyroxine  88 mcg Oral Daily    cetirizine  10 mg Oral Daily    metoprolol tartrate  12.5 mg Oral BID    pantoprazole  40 mg Intravenous Daily    predniSONE  2 mg Oral Daily    sertraline  50 mg Oral Daily    sodium chloride flush  10 mL Intravenous 2 times per day     Continuous Infusions:   dextrose 5 % and 0.9 % NaCl 100 mL/hr at 08/12/20 2344     PRN Meds:sodium chloride flush, acetaminophen **OR** acetaminophen, promethazine **OR** ondansetron    Imaging:    Lab Review :    Lab Results   Component Value Date    WBC 10.1 08/12/2020    HGB 12.4 08/12/2020    HCT 41.9 08/12/2020    MCV 94.6 08/12/2020     08/12/2020     Lab Results   Component Value Date    CREATININE 0.7 08/12/2020    BUN 14 08/12/2020     08/12/2020    K 4.0 08/12/2020     08/12/2020    CO2 28 08/12/2020     Lab Results   Component Value Date    CKTOTAL 41 10/16/2016     Lab Results   Component Value Date    ALT 10 (L) 08/12/2020    AST 17 08/12/2020    ALKPHOS 39 08/12/2020    BILITOT 0.6 08/12/2020     Lab Results   Component Value Date    LIPASE 12.6 08/12/2020         Electronically signed by Lazaro Almodovar MD on 8/13/20 at 6:52 AM AMELIA Vogt M.D.

## 2020-08-14 PROCEDURE — 97162 PT EVAL MOD COMPLEX 30 MIN: CPT

## 2020-08-14 PROCEDURE — 6370000000 HC RX 637 (ALT 250 FOR IP): Performed by: EMERGENCY MEDICINE

## 2020-08-14 PROCEDURE — 6360000002 HC RX W HCPCS: Performed by: EMERGENCY MEDICINE

## 2020-08-14 PROCEDURE — 2580000003 HC RX 258: Performed by: EMERGENCY MEDICINE

## 2020-08-14 PROCEDURE — C9113 INJ PANTOPRAZOLE SODIUM, VIA: HCPCS | Performed by: EMERGENCY MEDICINE

## 2020-08-14 PROCEDURE — 97530 THERAPEUTIC ACTIVITIES: CPT

## 2020-08-14 PROCEDURE — 94640 AIRWAY INHALATION TREATMENT: CPT

## 2020-08-14 PROCEDURE — 94761 N-INVAS EAR/PLS OXIMETRY MLT: CPT

## 2020-08-14 PROCEDURE — 99232 SBSQ HOSP IP/OBS MODERATE 35: CPT | Performed by: SURGERY

## 2020-08-14 PROCEDURE — 2700000000 HC OXYGEN THERAPY PER DAY

## 2020-08-14 PROCEDURE — 1200000000 HC SEMI PRIVATE

## 2020-08-14 RX ADMIN — PREDNISONE 2 MG: 1 TABLET ORAL at 09:07

## 2020-08-14 RX ADMIN — METOPROLOL TARTRATE 12.5 MG: 25 TABLET, FILM COATED ORAL at 20:06

## 2020-08-14 RX ADMIN — METOPROLOL TARTRATE 12.5 MG: 25 TABLET, FILM COATED ORAL at 09:07

## 2020-08-14 RX ADMIN — ISOSORBIDE MONONITRATE 30 MG: 30 TABLET ORAL at 09:07

## 2020-08-14 RX ADMIN — IPRATROPIUM BROMIDE AND ALBUTEROL SULFATE 3 ML: .5; 3 SOLUTION RESPIRATORY (INHALATION) at 13:51

## 2020-08-14 RX ADMIN — CETIRIZINE HYDROCHLORIDE 10 MG: 10 TABLET, FILM COATED ORAL at 09:07

## 2020-08-14 RX ADMIN — DEXTROSE AND SODIUM CHLORIDE: 5; 900 INJECTION, SOLUTION INTRAVENOUS at 09:15

## 2020-08-14 RX ADMIN — LEVOTHYROXINE SODIUM 88 MCG: 88 TABLET ORAL at 06:06

## 2020-08-14 RX ADMIN — PANTOPRAZOLE SODIUM 40 MG: 40 INJECTION, POWDER, FOR SOLUTION INTRAVENOUS at 09:07

## 2020-08-14 RX ADMIN — IPRATROPIUM BROMIDE AND ALBUTEROL SULFATE 3 ML: .5; 3 SOLUTION RESPIRATORY (INHALATION) at 08:22

## 2020-08-14 RX ADMIN — SERTRALINE HYDROCHLORIDE 50 MG: 50 TABLET, FILM COATED ORAL at 09:07

## 2020-08-14 RX ADMIN — IPRATROPIUM BROMIDE AND ALBUTEROL SULFATE 3 ML: .5; 3 SOLUTION RESPIRATORY (INHALATION) at 21:14

## 2020-08-14 RX ADMIN — Medication 10 ML: at 21:09

## 2020-08-14 RX ADMIN — ACETAMINOPHEN 650 MG: 325 TABLET ORAL at 09:07

## 2020-08-14 RX ADMIN — DEXTROSE AND SODIUM CHLORIDE: 5; 900 INJECTION, SOLUTION INTRAVENOUS at 21:16

## 2020-08-14 RX ADMIN — Medication 10 ML: at 09:07

## 2020-08-14 ASSESSMENT — PAIN SCALES - GENERAL
PAINLEVEL_OUTOF10: 0
PAINLEVEL_OUTOF10: 5
PAINLEVEL_OUTOF10: 0
PAINLEVEL_OUTOF10: 0

## 2020-08-14 ASSESSMENT — PAIN DESCRIPTION - PAIN TYPE: TYPE: ACUTE PAIN

## 2020-08-14 NOTE — PROGRESS NOTES
[E03.4] 03/03/2016     Priority: Medium    GERD (gastroesophageal reflux disease) [K21.9]      Priority: Medium       Plan:     Discussed plans with the nursing staff  Discussed about surgery if no better  Clamp NGT  Labs in AM    Medications, Laboratories and Imaging results:    Scheduled Meds:   oxymetazoline  2 spray Each Nostril Once    lidocaine   Topical Once    [Held by provider] clopidogrel  75 mg Oral Daily    ipratropium-albuterol  3 mL Inhalation Q6H WA    isosorbide mononitrate  30 mg Oral Daily    levothyroxine  88 mcg Oral Daily    cetirizine  10 mg Oral Daily    metoprolol tartrate  12.5 mg Oral BID    pantoprazole  40 mg Intravenous Daily    predniSONE  2 mg Oral Daily    sertraline  50 mg Oral Daily    sodium chloride flush  10 mL Intravenous 2 times per day     Continuous Infusions:   dextrose 5 % and 0.9 % NaCl 100 mL/hr at 08/14/20 0915     PRN Meds:phenol, sodium chloride flush, acetaminophen **OR** acetaminophen, promethazine **OR** ondansetron    Imaging:    Lab Review :    Lab Results   Component Value Date    WBC 10.1 08/12/2020    HGB 12.4 08/12/2020    HCT 41.9 08/12/2020    MCV 94.6 08/12/2020     08/12/2020     Lab Results   Component Value Date    CREATININE 0.7 08/12/2020    BUN 14 08/12/2020     08/12/2020    K 4.0 08/12/2020     08/12/2020    CO2 28 08/12/2020     Lab Results   Component Value Date    CKTOTAL 41 10/16/2016     Lab Results   Component Value Date    ALT 10 (L) 08/12/2020    AST 17 08/12/2020    ALKPHOS 39 08/12/2020    BILITOT 0.6 08/12/2020     Lab Results   Component Value Date    LIPASE 12.6 08/12/2020         Electronically signed by Giuliana Patel MD on 8/14/20 at 12:54 PM FADIT                                                                                           Edith Downing M.D.

## 2020-08-14 NOTE — PROGRESS NOTES
[address] [id number]  INPATIENT PHYSICAL THERAPY  EVALUATION  [facility] MED SURG 8A - 8A-17/017-A    Time In: 9261  Time Out: 1315  Timed Code Treatment Minutes: 8 Minutes  Minutes: 20          Date: 2020  Patient Name: Yarely Gonzalez,  Gender:  female        MRN: [id number]  : 1933  (80 y.o.)  Referral Date : 20   Referring Practitioner: Julio Clay MD  Diagnosis: Abdominal pain  Additional Pertinent Hx: 81 y/o female with 4 day history of increasing episodes of nausea, vomiting, diarrhea     Restrictions/Precautions:  Restrictions/Precautions: Fall Risk    Subjective:  Chart Reviewed: Yes  Patient assessed for rehabilitation services?: Yes  Family / Caregiver Present: No  Subjective: Doing well, agreeable for physical therapy    General:  Overall Orientation Status: Within Normal Limits  Follows Commands: Within Functional Limits    Vision: Impaired  Vision Exceptions: Wears glasses for reading    Hearing: Within functional limits         Pain: 0/10: -    Social/Functional History:    Lives With: Alone(aide that comes, RN comes 1x/week)  Type of Home: House  Home Layout: One level  Home Access: Level entry  Home Equipment: Rolling walker     Bathroom Shower/Tub: Tub/Shower unit, Shower chair with back(needs shower chair)  Bathroom Toilet: Handicap height  Bathroom Equipment: Grab bars in shower, Grab bars around toilet  Bathroom Accessibility: Accessible    Receives Help From: Family(niece and nephew)  ADL Assistance: Needs assistance     Transfer Assistance: Independent               OBJECTIVE:  Range of Motion:  Bilateral Lower Extremity: WFL    Strength:  Bilateral Lower Extremity: 3+/5 L, 4-/5 R    Balance:  Static Sitting Balance:  Independent  Dynamic Sitting Balance: Independent  Static Standing Balance: Contact Guard Assistance, X 1  Dynamic Standing Balance: Contact Guard Assistance, X 1, with cues for safety    Bed Mobility:  Rolling to Left: Contact Guard Assistance   Rolling to Right: Contact Guard Assistance   Supine to Sit: Minimal Assistance, X 1, with head of bed raised  Sit to Supine: Minimal Assistance, X 1, with head of bed raised     Transfers:  Sit to Stand: Minimal Assistance, X 1, increased bed height  Stand to Sit:Minimal Assistance, X 1, with increased time for completion, cues for hand placement, decreased bed height    Ambulation:  Contact Guard Assistance, X 1, with cues for safety  Distance: 10'  Surface: Level Tile  Device:Rolling Walker  Gait Deviations:  Slow Gillian, Decreased Step Length Bilaterally, Decreased Arm Swing, Decreased Trunk Rotation, Decreased Gait Speed and Decreased Heel Strike Bilaterally    Exercise:  Patient was guided in 2 set(s) 10 reps of exercise to both lower extremities. Seated marches, Long arc quads, Standing heel/toe raises, Standing marches and Mini squats. Exercises were completed for increased independence with functional mobility. Functional Outcome Measures: Completed  AM-PAC Inpatient Mobility Raw Score : 17  AM-PAC Inpatient T-Scale Score : 42.13    ASSESSMENT:  Activity Tolerance:  Patient tolerance of  treatment: fair. Dyspnea on exertion with decreased upright tolerance      Treatment Initiated: Treatment and education initiated within context of evaluation. Evaluation time included review of current medical information, gathering information related to past medical, social and functional history, completion of standardized testing, formal and informal observation of tasks, assessment of data and development of plan of care and goals. Treatment time included skilled education and facilitation of tasks to increase safety and independence with functional mobility for improved independence and quality of life. Assessment:   Body structures, Functions, Activity limitations: Decreased functional mobility , Decreased balance, Decreased strength, Decreased endurance  Assessment: 81 y/o female with decreased functional mobility secondary to hospitalization. These deficits will benefit from physical therapy services. Prognosis: Fair    REQUIRES PT FOLLOW UP: Yes    Discharge Recommendations:  Discharge Recommendations: [address], Patient would benefit from continued therapy after discharge, Home with Home health PT(Home with [place] and assist 24/7 vs SNF for rehabilitation)    Patient Education:  PT Education: Goals, General Safety, Gait Training, Home Exercise Program, Transfer Training    Equipment Recommendations:  Equipment Needed: Yes  Other: shower chair    Plan:  Times per week: 3-5x GM  Current Treatment Recommendations: Strengthening, IADL Training, Balance Training, Endurance Training, Gait Training, Transfer Training    Goals:  Patient goals : to go home  Short term goals  Time Frame for Short term goals: by d/c  Short term goal 1: Patient to demonstrate improved transfers to mod I with RW for safe pre-gait activities  Short term goal 2: Patient to demonstrate improved ambulation to >50' mod I with RW for safe ability to return home  Short term goal 3: Patient to demonstrate improved bed mobility to independent for safe transition to sitting position  Long term goals  Time Frame for Long term goals : n/a d/t short ELOS    Following session, patient left in safe position with all fall risk precautions in place.

## 2020-08-14 NOTE — PROGRESS NOTES
Alvin Jovel MD  Daily Progress Note  Pt Name: Vicky Joseph  Medical Record Number: [id number]  Date of Birth 5/29/1933   Today's Date: 8/14/2020  Chief complaint: I want this tube out  ASSESSMENT:   1. Hospital day # 2   2. Nausea vomiting diarrhea clinically improved. 3. Reducible abdominal wall hernia  4. History of metastatic breast cancer  5. New splenic lesions   has a past medical history of Anxiety, Blind right eye, Breast cancer metastasized to liver [facility]), Carotid stenosis, Colostomy in place [facility]), Degenerative arthritis of cervical spine, GERD (gastroesophageal reflux disease), Hypercholesteremia, Hypoestrogenism, Hypothyroidism, Lumbago, Lumbosacral spinal stenosis, MDRO (multiple drug resistant organisms) resistance, Personal history of cardiac dysrhythmia, Sigmoid diverticulosis, Spondylolisthesis of lumbosacral region, Steroid-induced hyperglycemia, Subclavian artery stenosis (HCC), Superior and Inferior Pubic ramus fracture, right, closed, initial encounter ([address] SVT (supraventricular tachycardia) ([address] Temporal arteritis ([address] Vertebral artery occlusion, and Vitamin D deficiency. RECOMMENDATIONS:   1. IV hydration  2. Analgesics and antiemetics as needed  3. Trial clamping NG tube. Remove if patient tolerates and may begin a trial of oral clear liquids. 4.  DVT prophylaxis per primary service. Patient has SCDs. Okay for Lovenox  5. Or chemical prophylaxis from a surgical standpoint increase activity as tolerated  6. 7.  GI panel negative  SUBJECTIVE:   Patsy Oviedo is doing better. Stool output decreased she denies abdominal pain. Complains only of \"soreness \". Passing flatus. Diarrhea decreasing. NG tube minimal out last 12 to 24 hours. Left-sided abdominal wall hernia reducible. Patient reluctant for any surgical intervention.   MEDICATIONS   Scheduled Meds:   oxymetazoline  2 spray Each Nostril Once    lidocaine   Topical Once    [Held by provider] clopidogrel  75 mg Oral Daily    ipratropium-albuterol  3 mL Inhalation Q6H WA    isosorbide mononitrate  30 mg Oral Daily    levothyroxine  88 mcg Oral Daily    cetirizine  10 mg Oral Daily    metoprolol tartrate  12.5 mg Oral BID    pantoprazole  40 mg Intravenous Daily    predniSONE  2 mg Oral Daily    sertraline  50 mg Oral Daily    sodium chloride flush  10 mL Intravenous 2 times per day     Continuous Infusions:   dextrose 5 % and 0.9 % NaCl 100 mL/hr at 20 2220     PRN Meds:.phenol, sodium chloride flush, acetaminophen **OR** acetaminophen, promethazine **OR** ondansetron  OBJECTIVE   CURRENT VITALS:  height is 5' (1.524 m) and weight is 133 lb 13.1 oz (60.7 kg). Her oral temperature is 98.1 °F (36.7 °C). Her blood pressure is 142/41 (abnormal) and her pulse is 81. Her respiration is 16 and oxygen saturation is 93%. Temperature Range (24h):Temp: 98.1 °F (36.7 °C) Temp  Av.9 °F (36.6 °C)  Min: 97.5 °F (36.4 °C)  Max: 98.1 °F (36.7 °C)  BP Range (87E): Systolic (31CBQ), JVC:975 , Min:112 , BHC:054     Diastolic (00LBT), BRX:93, Min:40, Max:74    Pulse Range (24h): Pulse  Av.7  Min: 52  Max: 81  Respiration Range (24h): Resp  Av.7  Min: 16  Max: 18  Current Pulse Ox (24h):  SpO2: 93 %  Pulse Ox Range (24h):  SpO2  Av.9 %  Min: 81 %  Max: 95 %  Oxygen Amount and Delivery: O2 Flow Rate (L/min): 2 L/min  Incentive Spirometry Tx:            GENERAL: alert, no distress  LUNGS: clear  HEART:normal heart rate  ABDOMEN:soft hernia reduced, bowel sounds normoactive  EXTREMITY: no cyanosis, clubbing or edema , multiple bruises  In: 1507.5 [I.V.:1507.5]  Out: 1250 [Urine:1250]     Date 20 0000 - 20 2359   Shift 0766-0784 9082-9326 7934-4437 24 Hour Total   INTAKE   I.V.(mL/kg) 705. 1(11.6)   705. 1(11.6)   Shift Total(mL/kg) 705. 1(11.6)   705. 1(11.6)   OUTPUT   Urine(mL/kg/hr) 750(1.5)   750   Shift Total(mL/kg) 750(12.4)   750(12.4)   Weight (kg) 60.7 60.7 60.7 60.7     LABS     Recent Labs     08/12/20  1505   WBC 10.1   HGB 12.4   HCT 41.9         K 4.0      CO2 28   BUN 14   CREATININE 0.7   CALCIUM 8.8      No results for input(s): PTT, INR in the last 72 hours. Invalid input(s): PT  Recent Labs     08/12/20  1505   AST 17   ALT 10*   BILITOT 0.6   LIPASE 12.6     Recent Labs     08/12/20  1512   TROPONINT < 0.010         RADIOLOGY     XR ABDOMEN (2 VIEWS)   Final Result   Findings which could be on the basis of small bowel obstruction. When compared to the previous CT scan there is improved appearance of the abdomen. **This report has been created using voice recognition software. It may contain minor errors which are inherent in voice recognition technology. **      Final report electronically signed by Dr Haley Cottrell on 8/13/2020 8:57 AM      XR CHEST PORTABLE   Final Result   No acute findings               **This report has been created using voice recognition software. It may contain minor errors which are inherent in voice recognition technology. **      Final report electronically signed by Dr. Tami Faye on 8/12/2020 6:11 PM      CT ABDOMEN PELVIS W IV CONTRAST Additional Contrast? None   Final Result   1. Findings of distal small bowel obstruction without definite transition point. Left ventral hernia containing a dilated loop of bowel but this does not definitively appear to be the obstruction point. 2. Minimal ascites. 3. Chronic Fractures of the superior and inferior pubic rami on the right. **This report has been created using voice recognition software. It may contain minor errors which are inherent in voice recognition technology. **      Final report electronically signed by Dr. Hamlet Delong on 8/12/2020 4:42 PM          Electronically signed by Pro Rae MD on 8/14/2020 at 8:48 AM

## 2020-08-14 NOTE — PLAN OF CARE
Problem: Pain:  Goal: Pain level will decrease  Description: Pain level will decrease  Outcome: Ongoing  Note: Pt denies pain on my shift. Non pharmaceutical interventions like ice and repositioning offered before pain medications. Will continue to assess. Problem: Pain:  Goal: Control of acute pain  Description: Control of acute pain  Outcome: Ongoing  Note: Pt denies pain on my shift. Non pharmaceutical interventions like ice and repositioning offered before pain medications. Will continue to assess. Problem: Pain:  Goal: Control of chronic pain  Description: Control of chronic pain  Outcome: Ongoing  Note: Pt denies pain on my shift. Non pharmaceutical interventions like ice and repositioning offered before pain medications. Will continue to assess. Problem: Falls - Risk of:  Goal: Will remain free from falls  Description: Will remain free from falls  Outcome: Ongoing  Note: Fall precaution in place. Bed alarm/chair alarm. Bed locked in lowest position. Fall band applied if applicable. Call light and overhead table within reach. Will continue to monitor.;     Problem: Falls - Risk of:  Goal: Absence of physical injury  Description: Absence of physical injury  Outcome: Ongoing  Note: Absence of physical injury. Problem: Skin Integrity:  Goal: Will show no infection signs and symptoms  Description: Will show no infection signs and symptoms  Outcome: Ongoing  Note: Pt shows no new signs or symptoms of infection on my shift. Will continue to assess. Problem: Skin Integrity:  Goal: Absence of new skin breakdown  Description: Absence of new skin breakdown  Outcome: Ongoing  Note: Absence of new skin integrity issues on my shift. Will continue to assess. Problem: DISCHARGE BARRIERS  Goal: Patient's continuum of care needs are met  Outcome: Ongoing  Note: Pt continuum of care needs are being worked out. Pt verbalizes understanding of care plan.  Pt contributes to goal settings.

## 2020-08-15 VITALS
RESPIRATION RATE: 16 BRPM | HEIGHT: 60 IN | OXYGEN SATURATION: 92 % | WEIGHT: 131.56 LBS | DIASTOLIC BLOOD PRESSURE: 50 MMHG | SYSTOLIC BLOOD PRESSURE: 102 MMHG | HEART RATE: 61 BPM | TEMPERATURE: 98.4 F | BODY MASS INDEX: 25.83 KG/M2

## 2020-08-15 LAB
ALBUMIN SERPL-MCNC: 3.3 G/DL (ref 3.5–5.1)
ALP BLD-CCNC: 36 U/L (ref 38–126)
ALT SERPL-CCNC: 7 U/L (ref 11–66)
ANION GAP SERPL CALCULATED.3IONS-SCNC: 4 MEQ/L (ref 8–16)
AST SERPL-CCNC: 14 U/L (ref 5–40)
BILIRUB SERPL-MCNC: 0.6 MG/DL (ref 0.3–1.2)
BUN BLDV-MCNC: 5 MG/DL (ref 7–22)
CALCIUM SERPL-MCNC: 7.4 MG/DL (ref 8.5–10.5)
CHLORIDE BLD-SCNC: 107 MEQ/L (ref 98–111)
CO2: 30 MEQ/L (ref 23–33)
CREAT SERPL-MCNC: 0.5 MG/DL (ref 0.4–1.2)
ERYTHROCYTE [DISTWIDTH] IN BLOOD BY AUTOMATED COUNT: 15.1 % (ref 11.5–14.5)
ERYTHROCYTE [DISTWIDTH] IN BLOOD BY AUTOMATED COUNT: 54 FL (ref 35–45)
GFR SERPL CREATININE-BSD FRML MDRD: > 90 ML/MIN/1.73M2
GLUCOSE BLD-MCNC: 104 MG/DL (ref 70–108)
HCT VFR BLD CALC: 37.8 % (ref 37–47)
HEMOGLOBIN: 10.9 GM/DL (ref 12–16)
MCH RBC QN AUTO: 27.9 PG (ref 26–33)
MCHC RBC AUTO-ENTMCNC: 28.8 GM/DL (ref 32.2–35.5)
MCV RBC AUTO: 96.9 FL (ref 81–99)
PLATELET # BLD: 109 THOU/MM3 (ref 130–400)
PMV BLD AUTO: 11.9 FL (ref 9.4–12.4)
POTASSIUM SERPL-SCNC: 4.2 MEQ/L (ref 3.5–5.2)
RBC # BLD: 3.9 MILL/MM3 (ref 4.2–5.4)
SODIUM BLD-SCNC: 141 MEQ/L (ref 135–145)
TOTAL PROTEIN: 5.3 G/DL (ref 6.1–8)
WBC # BLD: 6.3 THOU/MM3 (ref 4.8–10.8)

## 2020-08-15 PROCEDURE — C9113 INJ PANTOPRAZOLE SODIUM, VIA: HCPCS | Performed by: EMERGENCY MEDICINE

## 2020-08-15 PROCEDURE — 6370000000 HC RX 637 (ALT 250 FOR IP): Performed by: EMERGENCY MEDICINE

## 2020-08-15 PROCEDURE — 36415 COLL VENOUS BLD VENIPUNCTURE: CPT

## 2020-08-15 PROCEDURE — 94640 AIRWAY INHALATION TREATMENT: CPT

## 2020-08-15 PROCEDURE — 6360000002 HC RX W HCPCS: Performed by: EMERGENCY MEDICINE

## 2020-08-15 PROCEDURE — 80053 COMPREHEN METABOLIC PANEL: CPT

## 2020-08-15 PROCEDURE — 2580000003 HC RX 258: Performed by: EMERGENCY MEDICINE

## 2020-08-15 PROCEDURE — 85027 COMPLETE CBC AUTOMATED: CPT

## 2020-08-15 PROCEDURE — 99232 SBSQ HOSP IP/OBS MODERATE 35: CPT | Performed by: SURGERY

## 2020-08-15 PROCEDURE — 94760 N-INVAS EAR/PLS OXIMETRY 1: CPT

## 2020-08-15 RX ADMIN — METOPROLOL TARTRATE 12.5 MG: 25 TABLET, FILM COATED ORAL at 09:35

## 2020-08-15 RX ADMIN — PREDNISONE 2 MG: 1 TABLET ORAL at 09:36

## 2020-08-15 RX ADMIN — DEXTROSE AND SODIUM CHLORIDE: 5; 900 INJECTION, SOLUTION INTRAVENOUS at 07:25

## 2020-08-15 RX ADMIN — LEVOTHYROXINE SODIUM 88 MCG: 88 TABLET ORAL at 06:45

## 2020-08-15 RX ADMIN — ISOSORBIDE MONONITRATE 30 MG: 30 TABLET ORAL at 09:35

## 2020-08-15 RX ADMIN — IPRATROPIUM BROMIDE AND ALBUTEROL SULFATE 3 ML: .5; 3 SOLUTION RESPIRATORY (INHALATION) at 09:48

## 2020-08-15 RX ADMIN — IPRATROPIUM BROMIDE AND ALBUTEROL SULFATE 3 ML: .5; 3 SOLUTION RESPIRATORY (INHALATION) at 13:27

## 2020-08-15 RX ADMIN — Medication 10 ML: at 09:36

## 2020-08-15 RX ADMIN — PANTOPRAZOLE SODIUM 40 MG: 40 INJECTION, POWDER, FOR SOLUTION INTRAVENOUS at 09:36

## 2020-08-15 RX ADMIN — SERTRALINE HYDROCHLORIDE 50 MG: 50 TABLET, FILM COATED ORAL at 09:35

## 2020-08-15 RX ADMIN — CETIRIZINE HYDROCHLORIDE 10 MG: 10 TABLET, FILM COATED ORAL at 09:35

## 2020-08-15 NOTE — PROGRESS NOTES
Patient: Archie Herron  Unit/Bed: 8A-17/017-A  YOB: 1933  MRN: [id number] Acct: [de-identified]   Admitting Diagnosis: Abdominal pain [R10.9]  Admit Date:  8/12/2020  Hospital Day: 3    Assessment:     Principal Problem:    Abdominal pain  Active Problems:    Abdominal pain, vomiting, and diarrhea    GERD (gastroesophageal reflux disease)    Hypothyroidism due to acquired atrophy of thyroid    Hx of breast cancer with liver mets    Coronary artery disease involving native coronary artery of native heart without angina pectoris    Anxiety    SBO (small bowel obstruction) ([address]  Resolved Problems:    * No resolved hospital problems. *      Plan:     Discharge to home  Discussed need for follow up with Dr Juve Youssef:     Patient denies CP, has some SOB as usual, tolerated regular lunch, had BM this afternoon   Medication side effects: none    Scheduled Meds:   oxymetazoline  2 spray Each Nostril Once    lidocaine   Topical Once    [Held by provider] clopidogrel  75 mg Oral Daily    ipratropium-albuterol  3 mL Inhalation Q6H WA    isosorbide mononitrate  30 mg Oral Daily    levothyroxine  88 mcg Oral Daily    cetirizine  10 mg Oral Daily    metoprolol tartrate  12.5 mg Oral BID    pantoprazole  40 mg Intravenous Daily    predniSONE  2 mg Oral Daily    sertraline  50 mg Oral Daily    sodium chloride flush  10 mL Intravenous 2 times per day     Continuous Infusions:   dextrose 5 % and 0.9 % NaCl 100 mL/hr at 08/15/20 0725     PRN Meds:phenol, sodium chloride flush, acetaminophen **OR** acetaminophen, promethazine **OR** ondansetron    Review of Systems  Pertinent items are noted in HPI. Objective:     Patient Vitals for the past 8 hrs:   BP Temp Temp src Pulse Resp SpO2   08/15/20 1140 (!) 102/50 98.4 °F (36.9 °C) Oral 61 16 92 %   08/15/20 0948 -- -- -- -- 16 94 %   08/15/20 0813 (!) 148/67 99.5 °F (37.5 °C) Oral 59 18 --     I/O last 3 completed shifts:   In: 3611.2 [P.O.:560; I.V.:3051.2]  Out: -   No intake/output data recorded. BP (!) 102/50   Pulse 61   Temp 98.4 °F (36.9 °C) (Oral)   Resp 16   Ht 5' (1.524 m)   Wt 131 lb 9 oz (59.7 kg)   LMP  (LMP Unknown)   SpO2 92%   BMI 25.69 kg/m²     BP (!) 102/50   Pulse 61   Temp 98.4 °F (36.9 °C) (Oral)   Resp 16   Ht 5' (1.524 m)   Wt 131 lb 9 oz (59.7 kg)   LMP  (LMP Unknown)   SpO2 92%   BMI 25.69 kg/m²   General appearance: alert, appears stated age and cooperative  Head: Normocephalic, without obvious abnormality, atraumatic  Lungs: clear to auscultation bilaterally  Heart: regular rate and rhythm, S1, S2 normal, no murmur, click, rub or gallop  Abdomen: soft, non-tender; bowel sounds normal; no masses,  no organomegaly  Extremities: extremities normal, atraumatic, no cyanosis or edema  Skin: Skin color, texture, turgor normal. No rashes or lesions  Neurologic: Grossly normal    Data Review:   Results for Jenny Milian (MRN [id number]) as of 8/15/2020 15:04   Ref.  Range 8/12/2020 15:05 8/12/2020 15:12 8/12/2020 15:15 8/15/2020 05:36   Sodium Latest Ref Range: 135 - 145 meq/L 143   141   Potassium Latest Ref Range: 3.5 - 5.2 meq/L 4.0   4.2   Chloride Latest Ref Range: 98 - 111 meq/L 104   107   CO2 Latest Ref Range: 23 - 33 meq/L 28   30   BUN Latest Ref Range: 7 - 22 mg/dL 14   5 (L)   Creatinine Latest Ref Range: 0.4 - 1.2 mg/dL 0.7   0.5   Anion Gap Latest Ref Range: 8.0 - 16.0 meq/L 11.0   4.0 (L)   Est, Glom Filt Rate Latest Units: ml/min/1.73m2 79 (A)   >90   Lactic Acid, Sepsis Latest Ref Range: 0.5 - 1.9 mmol/L   0.7    Glucose Latest Ref Range: 70 - 108 mg/dL 120 (H)   104   Calcium Latest Ref Range: 8.5 - 10.5 mg/dL 8.8   7.4 (L)   Osmolality Calc Latest Ref Range: 275.0 - 300 mOsmol/kg 286.6      Total Protein Latest Ref Range: 6.1 - 8.0 g/dL 5.6 (L)   5.3 (L)   Troponin T Latest Units: ng/ml  < 0.010     Albumin Latest Ref Range: 3.5 - 5.1 g/dL 3.8   3.3 (L)   Alk Phos Latest Ref Range: 38 - 126 U/L 39   36

## 2020-08-15 NOTE — PROGRESS NOTES
Discharge teaching and instructions completed with patient and niece using teachback method. AVS reviewed. Patient and niece voiced understanding regarding medications, follow up appointments, and care of self at home. Discharged in a wheelchair to home with support per niece.

## 2020-08-15 NOTE — PROGRESS NOTES
Dominick Lyle MD  Daily Progress Note  Pt Name: Lynford Simmonds  Medical Record Number: [id number]  Date of Birth 5/29/1933   Today's Date: 8/15/2020  Chief complaint: I want this tube out  ASSESSMENT:   1. Hospital day # 3   2. Nausea vomiting diarrhea clinically improved. 3. Reducible abdominal wall hernia  4. History of metastatic breast cancer  5. New splenic lesions   has a past medical history of Anxiety, Blind right eye, Breast cancer metastasized to liver [facility]), Carotid stenosis, Colostomy in place [facility]), Degenerative arthritis of cervical spine, GERD (gastroesophageal reflux disease), Hypercholesteremia, Hypoestrogenism, Hypothyroidism, Lumbago, Lumbosacral spinal stenosis, MDRO (multiple drug resistant organisms) resistance, Personal history of cardiac dysrhythmia, Sigmoid diverticulosis, Spondylolisthesis of lumbosacral region, Steroid-induced hyperglycemia, Subclavian artery stenosis (HCC), Superior and Inferior Pubic ramus fracture, right, closed, initial encounter ([address] SVT (supraventricular tachycardia) ([address] Temporal arteritis ([address] Vertebral artery occlusion, and Vitamin D deficiency. RECOMMENDATIONS:   1. IV hydration  2. Analgesics and antiemetics as needed  3. Start regular diet   4. DVT prophylaxis per primary service. Patient has SCDs. Okay for Lovenox  5. Patient considering surgical intervention, will have her discuss with Dr. Neli Haynes. No emergent need for intervention   SUBJECTIVE:   Valerie Vogel is doing better. She is tolerating her clear liquid diet she is having bowel function she is not having any nausea or vomiting. She states she would maybe like to have surgery if this does not happen again. I discussed with her that this is not emergent as of right now and that she can talk with this with Dr. pena either while inpatient or as a follow-up.   MEDICATIONS   Scheduled Meds:   oxymetazoline  2 spray Each Nostril Once    lidocaine   Topical Once    08/12/20  1505 08/15/20  0536   WBC 10.1 6.3   HGB 12.4 10.9*   HCT 41.9 37.8    109*    141   K 4.0 4.2    107   CO2 28 30   BUN 14 5*   CREATININE 0.7 0.5   CALCIUM 8.8 7.4*      No results for input(s): PTT, INR in the last 72 hours. Invalid input(s): PT  Recent Labs     08/12/20  1505 08/15/20  0536   AST 17 14   ALT 10* 7*   BILITOT 0.6 0.6   LIPASE 12.6  --      Recent Labs     08/12/20  1512   TROPONINT < 0.010         RADIOLOGY     XR ABDOMEN (2 VIEWS)   Final Result   Findings which could be on the basis of small bowel obstruction. When compared to the previous CT scan there is improved appearance of the abdomen. **This report has been created using voice recognition software. It may contain minor errors which are inherent in voice recognition technology. **      Final report electronically signed by Dr Haley Cottrell on 8/13/2020 8:57 AM      XR CHEST PORTABLE   Final Result   No acute findings               **This report has been created using voice recognition software. It may contain minor errors which are inherent in voice recognition technology. **      Final report electronically signed by Dr. Tami Faye on 8/12/2020 6:11 PM      CT ABDOMEN PELVIS W IV CONTRAST Additional Contrast? None   Final Result   1. Findings of distal small bowel obstruction without definite transition point. Left ventral hernia containing a dilated loop of bowel but this does not definitively appear to be the obstruction point. 2. Minimal ascites. 3. Chronic Fractures of the superior and inferior pubic rami on the right. **This report has been created using voice recognition software. It may contain minor errors which are inherent in voice recognition technology. **      Final report electronically signed by Dr. Hamlet Delong on 8/12/2020 4:42 PM          Electronically signed by Denisse Putnam MD on 8/15/2020 at 11:49 AM

## 2020-08-15 NOTE — PLAN OF CARE
Problem: Impaired respiratory status  Goal: Clear lung sounds  8/15/2020 0952 by Faustino Royal RCP  Outcome: Ongoing

## 2020-08-15 NOTE — PLAN OF CARE
new skin breakdown. Will continue to monitor. Problem: DISCHARGE BARRIERS  Goal: Patient's continuum of care needs are met  8/15/2020 0211 by Juan Foote RN  Outcome: Ongoing  Note: Patient plans to discharge home when appropriate. No barriers noted at this time. Care plan reviewed with patient and. Patient verbalize understanding of the plan of care and contribute to goal setting.

## 2020-08-18 NOTE — TELEPHONE ENCOUNTER
Date of last visit:  6/4/2020  Date of next visit:  8/25/2020    Requested Prescriptions     Pending Prescriptions Disp Refills    metoprolol tartrate (LOPRESSOR) 25 MG tablet [Pharmacy Med Name: METOPROLOL TARTRATE 25 MG TAB] 90 tablet 1     Sig: take 1/2 tablet by mouth twice a day

## 2020-08-21 ENCOUNTER — NURSE ONLY (OUTPATIENT)
Dept: LAB | Age: 85
End: 2020-08-21

## 2020-08-21 LAB
ALBUMIN SERPL-MCNC: 3.8 G/DL (ref 3.5–5.1)
ALP BLD-CCNC: 39 U/L (ref 38–126)
ALT SERPL-CCNC: 11 U/L (ref 11–66)
ANION GAP SERPL CALCULATED.3IONS-SCNC: 9 MEQ/L (ref 8–16)
AST SERPL-CCNC: 16 U/L (ref 5–40)
BASOPHILS # BLD: 0.1 %
BASOPHILS ABSOLUTE: 0 THOU/MM3 (ref 0–0.1)
BILIRUB SERPL-MCNC: 0.2 MG/DL (ref 0.3–1.2)
BUN BLDV-MCNC: 15 MG/DL (ref 7–22)
CALCIUM SERPL-MCNC: 9.3 MG/DL (ref 8.5–10.5)
CHLORIDE BLD-SCNC: 106 MEQ/L (ref 98–111)
CHOLESTEROL, TOTAL: 137 MG/DL (ref 100–199)
CO2: 29 MEQ/L (ref 23–33)
CREAT SERPL-MCNC: 0.7 MG/DL (ref 0.4–1.2)
EOSINOPHIL # BLD: 2 %
EOSINOPHILS ABSOLUTE: 0.1 THOU/MM3 (ref 0–0.4)
ERYTHROCYTE [DISTWIDTH] IN BLOOD BY AUTOMATED COUNT: 14.9 % (ref 11.5–14.5)
ERYTHROCYTE [DISTWIDTH] IN BLOOD BY AUTOMATED COUNT: 50.8 FL (ref 35–45)
GFR SERPL CREATININE-BSD FRML MDRD: 79 ML/MIN/1.73M2
GLUCOSE BLD-MCNC: 81 MG/DL (ref 70–108)
HCT VFR BLD CALC: 38.3 % (ref 37–47)
HDLC SERPL-MCNC: 63 MG/DL
HEMOGLOBIN: 11.7 GM/DL (ref 12–16)
IMMATURE GRANS (ABS): 0.02 THOU/MM3 (ref 0–0.07)
IMMATURE GRANULOCYTES: 0.3 %
LDL CHOLESTEROL CALCULATED: 59 MG/DL
LYMPHOCYTES # BLD: 37.3 %
LYMPHOCYTES ABSOLUTE: 2.6 THOU/MM3 (ref 1–4.8)
MCH RBC QN AUTO: 28.4 PG (ref 26–33)
MCHC RBC AUTO-ENTMCNC: 30.5 GM/DL (ref 32.2–35.5)
MCV RBC AUTO: 93 FL (ref 81–99)
MONOCYTES # BLD: 7.3 %
MONOCYTES ABSOLUTE: 0.5 THOU/MM3 (ref 0.4–1.3)
NUCLEATED RED BLOOD CELLS: 0 /100 WBC
PLATELET # BLD: 194 THOU/MM3 (ref 130–400)
PMV BLD AUTO: 11.8 FL (ref 9.4–12.4)
POTASSIUM SERPL-SCNC: 4.5 MEQ/L (ref 3.5–5.2)
RBC # BLD: 4.12 MILL/MM3 (ref 4.2–5.4)
SEDIMENTATION RATE, ERYTHROCYTE: 22 MM/HR (ref 0–20)
SEG NEUTROPHILS: 53 %
SEGMENTED NEUTROPHILS ABSOLUTE COUNT: 3.7 THOU/MM3 (ref 1.8–7.7)
SODIUM BLD-SCNC: 144 MEQ/L (ref 135–145)
TOTAL PROTEIN: 5.5 G/DL (ref 6.1–8)
TRIGL SERPL-MCNC: 73 MG/DL (ref 0–199)
TSH SERPL DL<=0.05 MIU/L-ACNC: 7.53 UIU/ML (ref 0.4–4.2)
WBC # BLD: 7 THOU/MM3 (ref 4.8–10.8)

## 2020-08-23 LAB — CA 27-29: 7 U/ML (ref 0–38)

## 2020-08-24 ENCOUNTER — OFFICE VISIT (OUTPATIENT)
Dept: SURGERY | Age: 85
End: 2020-08-24
Payer: MEDICARE

## 2020-08-24 VITALS
HEART RATE: 59 BPM | TEMPERATURE: 97 F | BODY MASS INDEX: 26.45 KG/M2 | RESPIRATION RATE: 18 BRPM | WEIGHT: 134.7 LBS | DIASTOLIC BLOOD PRESSURE: 64 MMHG | HEIGHT: 60 IN | SYSTOLIC BLOOD PRESSURE: 120 MMHG | OXYGEN SATURATION: 96 %

## 2020-08-24 PROCEDURE — 1036F TOBACCO NON-USER: CPT | Performed by: SURGERY

## 2020-08-24 PROCEDURE — 1111F DSCHRG MED/CURRENT MED MERGE: CPT | Performed by: SURGERY

## 2020-08-24 PROCEDURE — 99213 OFFICE O/P EST LOW 20 MIN: CPT | Performed by: SURGERY

## 2020-08-24 PROCEDURE — G8417 CALC BMI ABV UP PARAM F/U: HCPCS | Performed by: SURGERY

## 2020-08-24 PROCEDURE — 4040F PNEUMOC VAC/ADMIN/RCVD: CPT | Performed by: SURGERY

## 2020-08-24 PROCEDURE — G8427 DOCREV CUR MEDS BY ELIG CLIN: HCPCS | Performed by: SURGERY

## 2020-08-24 PROCEDURE — 1090F PRES/ABSN URINE INCON ASSESS: CPT | Performed by: SURGERY

## 2020-08-24 PROCEDURE — 1123F ACP DISCUSS/DSCN MKR DOCD: CPT | Performed by: SURGERY

## 2020-08-24 NOTE — PROGRESS NOTES
Avis Coelman MD   General Surgery  Follow up Patient Evaluation in Office  Pt Name: Nini Edwards  Date of Birth 5/29/1933   Today's Date: 8/24/2020  Medical Record Number: [id number]  Referring Provider: Dr. Clarissa Sherwood  Primary Care Provider: Joshua France MD  Chief Complaint:  Chief Complaint   Patient presents with    Follow-Up from Hospital     SBO/ Left-sided abdominal wall hernia        ASSESSMENT      1. Abdominal pain, vomiting, and diarrhea    2. Gastroesophageal reflux disease, esophagitis presence not specified    3. Current chronic use of systemic steroids    4. Hx of breast cancer with liver mets    5. Generalized abdominal pain    6. Abdominal pain resolved  7. Reducible abdominal wall hernia at old ostomy site. 8.  New splenic lesions. History of metastatic breast cancer to liver and bone without current evidence of liver metastasis. PLANS      1. Latanya's the symptoms have resolved her hernia is reducible. She declined surgical intervention at this point. 2.  Dr. Ashlie Rodriguez plans PET/CT to further evaluate new lesions on recent imaging study, spleen  3. Follow-up surgical clinic in 6 to 8 weeks. Patient encouraged to call if recurrent symptoms or desires to proceed with surgical intervention though she would be at moderate risk for perioperative complications given her medical comorbidities, age and chronic steroid use. Brent Tucker is a 80y.o. year old female who is presenting today in the office for follow-up evaluation after recent hospitalization. She was admitted with nausea vomiting abdominal pain and diarrhea. Harry Zarate is well-known to me with past medical history as below. Symptoms resolved with conservative management. She was discharged from the hospital in a few days. She does have a hernia at her old ostomy site. Which today is very soft and easily reducible. She does not desire surgical intervention. Diarrhea has improved.   She has a history of chronic diarrhea as well. She has seen her medical oncologist Dr. Leda Gonzalez and is undergoing a PET CT to evaluate new lesions seen in spleen as well as bone lesions. No current evidence of liver metastasis. Bony metastasis have appeared stable. She had new lesions on her spleen. Had a lengthy discussion with Nellie Stevenson and her niece. She does not desire surgical intervention at this point unless recurrent symptoms. She is aware of increased risk. .       Past Medical History  Past Medical History:   Diagnosis Date    Anxiety     Blind right eye     Breast cancer metastasized to liver ([address] 05/10/2016    Liver lesion noted     Carotid stenosis      Left ICA 25%    Colostomy in place [facility]) 05/27/2016    Degenerative arthritis of cervical spine 5/07    GERD (gastroesophageal reflux disease) 11/06    Hypercholesteremia     Hypoestrogenism     Hypothyroidism     Lumbago     Lumbosacral spinal stenosis 05/2019    MDRO (multiple drug resistant organisms) resistance 2008    pt stated cleared    Personal history of cardiac dysrhythmia     Sigmoid diverticulosis 8/04    Spondylolisthesis of lumbosacral region 8/04    Steroid-induced hyperglycemia     Subclavian artery stenosis (HCC)     left    Superior and Inferior Pubic ramus fracture, right, closed, initial encounter ([facility] [address] 05/21/2019    SVT (supraventricular tachycardia) ([address] 6/07    Temporal arteritis ([address]     Vertebral artery occlusion     left    Vitamin D deficiency 06/2017       Past Surgical History  Past Surgical History:   Procedure Laterality Date    CARDIAC CATHETERIZATION  5/07    CATARACT REMOVAL  right 1/08; left 2/08    CHOLECYSTECTOMY  1/03    COLONOSCOPY  11/06    COLOSTOMY  09/04/2018    REVERSAL OF COLOSTOMY    DILATION AND CURETTAGE OF UTERUS      ENDOSCOPY, COLON, DIAGNOSTIC      EYE REMOVAL Right 03/2017    EYE SURGERY      EYE SURGERY Right 11/06/2017    Dr BELTRAN [facility] in [address]      partial    OTHER SURGICAL HISTORY  05/27/2016    robotic assisted laparoscopic anterior colon resection and end colostomy    NH OFFICE/OUTPT VISIT,PROCEDURE ONLY N/A 9/4/2018    COLOSTOMY REVERSAL AND PARASTOMAL HERNIA REPAIR performed by Nuria Wright MD at [address] [facility]  2002    UPPER GASTROINTESTINAL ENDOSCOPY  11/06       Medications  Current Outpatient Medications   Medication Sig Dispense Refill    metoprolol tartrate (LOPRESSOR) 25 MG tablet take 1/2 tablet by mouth twice a day 90 tablet 1    vitamin D (ERGOCALCIFEROL) 1.25 MG (60269 UT) CAPS capsule take 1 capsule by mouth every week      promethazine (PHENERGAN) 25 MG tablet Take 1 tablet by mouth every 6 hours as needed for Nausea (and vomiting) 12 tablet 0    simvastatin (ZOCOR) 20 MG tablet Take 1 tablet by mouth nightly 90 tablet 1    levothyroxine (SYNTHROID) 88 MCG tablet take 1 tablet by mouth once daily 90 tablet 1    pantoprazole (PROTONIX) 40 MG tablet take 1 tablet by mouth once daily 90 tablet 1    sertraline (ZOLOFT) 50 MG tablet take 1 tablet by mouth twice a day 180 tablet 1    isosorbide mononitrate (IMDUR) 30 MG extended release tablet take 1 tablet by mouth once daily 90 tablet 1    clopidogrel (PLAVIX) 75 MG tablet take 1 tablet by mouth once daily 90 tablet 1    Calcium Carbonate-Vitamin D (OYSTER SHELL CALCIUM/D) 500-200 MG-UNIT TABS take 1 tablet by mouth twice a day 180 tablet 3    loratadine (RA LORATADINE) 10 MG tablet take 1 tablet by mouth once daily 90 tablet 1    diclofenac sodium 1 % GEL Apply 4 g topically 4 times daily As needed for arthritis 2 Tube 5    aspirin 325 MG EC tablet Take 1 tablet by mouth daily 30 tablet 3    ipratropium-albuterol (DUONEB) 0.5-2.5 (3) MG/3ML SOLN nebulizer solution Inhale 3 mLs into the lungs every 6 hours while awake 30 vial 0    predniSONE (DELTASONE) 1 MG tablet Take 2 mg by mouth daily       acetaminophen (TYLENOL) 500 MG tablet Take 1,000 mg by mouth every 8 hours as needed Vascular: No JVD. Trachea: No tracheal deviation. Cardiovascular:      Rate and Rhythm: Normal rate. Heart sounds: Normal heart sounds. Pulmonary:      Effort: Pulmonary effort is normal. No respiratory distress. Breath sounds: Normal breath sounds. No wheezing. Abdominal:      General: Bowel sounds are normal. There is no distension. Palpations: Abdomen is soft. There is no mass. Tenderness: There is no abdominal tenderness. There is no guarding or rebound. Hernia: A hernia is present. Comments: Reducible left sided abdominal hernia at old ostomy site   Musculoskeletal: Normal range of motion. Lymphadenopathy:      Cervical: No cervical adenopathy. Skin:     General: Skin is warm and dry. Coloration: Skin is not jaundiced or pale. Findings: No rash. Neurological:      Mental Status: She is alert and oriented to person, place, and time. Mental status is at baseline. Cranial Nerves: No cranial nerve deficit.    Psychiatric:         Mood and Affect: Mood normal.         Behavior: Behavior normal.         Lab Results   Component Value Date    WBC 7.0 08/21/2020    HGB 11.7 (L) 08/21/2020    HCT 38.3 08/21/2020     08/21/2020    CHOL 137 08/21/2020    TRIG 73 08/21/2020    HDL 63 08/21/2020    ALT 11 08/21/2020    AST 16 08/21/2020     08/21/2020    K 4.5 08/21/2020     08/21/2020    CREATININE 0.7 08/21/2020    BUN 15 08/21/2020    CO2 29 08/21/2020    TSH 7.530 (H) 08/21/2020    INR 0.91 05/21/2019

## 2020-08-25 ENCOUNTER — OFFICE VISIT (OUTPATIENT)
Dept: FAMILY MEDICINE CLINIC | Age: 85
End: 2020-08-25

## 2020-08-25 VITALS
WEIGHT: 136.6 LBS | TEMPERATURE: 97.1 F | RESPIRATION RATE: 12 BRPM | HEART RATE: 72 BPM | HEIGHT: 62 IN | BODY MASS INDEX: 25.14 KG/M2 | DIASTOLIC BLOOD PRESSURE: 74 MMHG | SYSTOLIC BLOOD PRESSURE: 128 MMHG

## 2020-08-25 PROCEDURE — 99214 OFFICE O/P EST MOD 30 MIN: CPT | Performed by: EMERGENCY MEDICINE

## 2020-08-25 PROCEDURE — 1111F DSCHRG MED/CURRENT MED MERGE: CPT | Performed by: EMERGENCY MEDICINE

## 2020-08-25 RX ORDER — LEVOTHYROXINE SODIUM 0.1 MG/1
TABLET ORAL
Qty: 90 TABLET | Refills: 1 | Status: SHIPPED | OUTPATIENT
Start: 2020-08-25 | End: 2020-09-08 | Stop reason: SDUPTHER

## 2020-08-25 ASSESSMENT — ENCOUNTER SYMPTOMS
COUGH: 0
BACK PAIN: 1
ABDOMINAL PAIN: 0
SHORTNESS OF BREATH: 0
VOICE CHANGE: 0
CHEST TIGHTNESS: 0
WHEEZING: 0
BACK PAIN: 0
SORE THROAT: 0
BLOOD IN STOOL: 0
COUGH: 0
SINUS PRESSURE: 0
COLOR CHANGE: 0
VOICE CHANGE: 0
CONSTIPATION: 0
SORE THROAT: 0
WHEEZING: 0
SHORTNESS OF BREATH: 0
TROUBLE SWALLOWING: 0
VOMITING: 0
DIARRHEA: 0
DIARRHEA: 1
NAUSEA: 0
RHINORRHEA: 0
TROUBLE SWALLOWING: 0
CONSTIPATION: 1
ABDOMINAL PAIN: 0
VOMITING: 0
NAUSEA: 0

## 2020-08-25 NOTE — PROGRESS NOTES
Post-Discharge Transitional Care Management Services or Hospital Follow Up      Latanya Rod   YOB: 1933    Date of Office Visit:  8/25/2020  Date of Hospital Admission: 8/12/20  Date of Hospital Discharge: 8/15/20  Readmission Risk Score(high >=14%.  Medium >=10%):Readmission Risk Score: 15      Care management risk score Rising risk (score 2-5) and Complex Care (Scores >=6): 4     Non face to face  following discharge, date last encounter closed (first attempt may have been earlier): *No documented post hospital discharge outreach found in the last 14 days *No documented post hospital discharge outreach found in the last 14 days    Call initiated 2 business days of discharge: *No response recorded in the last 14 days     Patient Active Problem List   Diagnosis    GERD (gastroesophageal reflux disease)    Carotid stenosis    Hypercholesteremia    Hypothyroidism due to acquired atrophy of thyroid    Steroid-induced hyperglycemia    Blind right eye    Current chronic use of systemic steroids    Personal history of cardiac dysrhythmia    Coronary artery disease involving native coronary artery of native heart without angina pectoris    Anxiety    H/O: CVA (cerebrovascular accident)    SBO (small bowel obstruction) (HCC)    Lumbosacral spinal stenosis    Vitamin D deficiency    Abdominal pain    Abdominal pain, vomiting, and diarrhea    Hx of breast cancer with liver mets    Breast cancer metastasized to liver (HCC)       Allergies   Allergen Reactions    Bactrim [Sulfamethoxazole-Trimethoprim] Swelling     Of tongue    Demerol Rash     nausea    Pcn [Penicillins] Rash       Medications listed as ordered at the time of discharge from hospital   Latanya Rod   Home Medication Instructions MARCELINA:    Printed on:08/25/20 4777   Medication Information                      acetaminophen (TYLENOL) 500 MG tablet  Take 1,000 mg by mouth every 8 hours as needed for Pain              aspirin 325 MG EC tablet  Take 1 tablet by mouth daily             Calcium Carbonate-Vitamin D (OYSTER SHELL CALCIUM/D) 500-200 MG-UNIT TABS  take 1 tablet by mouth twice a day             clopidogrel (PLAVIX) 75 MG tablet  take 1 tablet by mouth once daily             diclofenac sodium 1 % GEL  Apply 4 g topically 4 times daily As needed for arthritis             ipratropium-albuterol (DUONEB) 0.5-2.5 (3) MG/3ML SOLN nebulizer solution  Inhale 3 mLs into the lungs every 6 hours while awake             isosorbide mononitrate (IMDUR) 30 MG extended release tablet  take 1 tablet by mouth once daily             levothyroxine (SYNTHROID) 100 MCG tablet  take 1 tablet by mouth once daily             loratadine (RA LORATADINE) 10 MG tablet  take 1 tablet by mouth once daily             metoprolol tartrate (LOPRESSOR) 25 MG tablet  take 1/2 tablet by mouth twice a day             Multiple Vitamins-Minerals (CENTRUM SILVER ADULT 50+) TABS  Take 1 tablet by mouth daily             pantoprazole (PROTONIX) 40 MG tablet  take 1 tablet by mouth once daily             predniSONE (DELTASONE) 1 MG tablet  Take 3 mg by mouth daily              promethazine (PHENERGAN) 25 MG tablet  Take 1 tablet by mouth every 6 hours as needed for Nausea (and vomiting)             sertraline (ZOLOFT) 50 MG tablet  take 1 tablet by mouth twice a day             simvastatin (ZOCOR) 20 MG tablet  Take 1 tablet by mouth nightly             vitamin D (ERGOCALCIFEROL) 1.25 MG (17032 UT) CAPS capsule  take 1 capsule by mouth every week                   Medications marked \"taking\" at this time  Outpatient Medications Marked as Taking for the 8/25/20 encounter (Office Visit) with Nori Toth MD   Medication Sig Dispense Refill    levothyroxine (SYNTHROID) 100 MCG tablet take 1 tablet by mouth once daily 90 tablet 1    metoprolol tartrate (LOPRESSOR) 25 MG tablet take 1/2 tablet by mouth twice a day 90 tablet 1    vitamin D (ERGOCALCIFEROL) 1.25 MG (49743 UT) CAPS capsule take 1 capsule by mouth every week      promethazine (PHENERGAN) 25 MG tablet Take 1 tablet by mouth every 6 hours as needed for Nausea (and vomiting) 12 tablet 0    simvastatin (ZOCOR) 20 MG tablet Take 1 tablet by mouth nightly 90 tablet 1    pantoprazole (PROTONIX) 40 MG tablet take 1 tablet by mouth once daily 90 tablet 1    sertraline (ZOLOFT) 50 MG tablet take 1 tablet by mouth twice a day 180 tablet 1    isosorbide mononitrate (IMDUR) 30 MG extended release tablet take 1 tablet by mouth once daily 90 tablet 1    clopidogrel (PLAVIX) 75 MG tablet take 1 tablet by mouth once daily 90 tablet 1    Calcium Carbonate-Vitamin D (OYSTER SHELL CALCIUM/D) 500-200 MG-UNIT TABS take 1 tablet by mouth twice a day 180 tablet 3    loratadine (RA LORATADINE) 10 MG tablet take 1 tablet by mouth once daily 90 tablet 1    diclofenac sodium 1 % GEL Apply 4 g topically 4 times daily As needed for arthritis 2 Tube 5    aspirin 325 MG EC tablet Take 1 tablet by mouth daily 30 tablet 3    ipratropium-albuterol (DUONEB) 0.5-2.5 (3) MG/3ML SOLN nebulizer solution Inhale 3 mLs into the lungs every 6 hours while awake 30 vial 0    predniSONE (DELTASONE) 1 MG tablet Take 3 mg by mouth daily       acetaminophen (TYLENOL) 500 MG tablet Take 1,000 mg by mouth every 8 hours as needed for Pain       Multiple Vitamins-Minerals (CENTRUM SILVER ADULT 50+) TABS Take 1 tablet by mouth daily 90 tablet 1        Medications patient taking as of now reconciled against medications ordered at time of hospital discharge: Yes    Chief Complaint   Patient presents with    Follow-Up from Hospital       HPI    Inpatient course: Discharge summary reviewed- see chart. Interval history/Current status: doing well, No SOB, No chest pain , No fatigue.  No nausea nor abdominal pain  Seen Dr Ned Holm and Dr Stephanie Forrester lately and she is getting PET scan this thursday      Review of Systems   Constitutional: Negative for appetite change, chills, diaphoresis, fatigue and fever. HENT: Negative for congestion, ear pain, postnasal drip, rhinorrhea, sinus pressure, sneezing, sore throat, trouble swallowing and voice change. Respiratory: Negative for cough, chest tightness, shortness of breath and wheezing. Cardiovascular: Negative for chest pain, palpitations and leg swelling. Gastrointestinal: Negative for abdominal pain, constipation, diarrhea, nausea and vomiting. Musculoskeletal: Negative for arthralgias, back pain, joint swelling, myalgias, neck pain and neck stiffness. Neurological: Negative for dizziness, syncope, weakness, light-headedness, numbness and headaches. Vitals:    08/25/20 1440   BP: 128/74   Site: Left Upper Arm   Position: Sitting   Cuff Size: Medium Adult   Pulse: 72   Resp: 12   Temp: 97.1 °F (36.2 °C)   Weight: 136 lb 9.6 oz (62 kg)   Height: 5' 2\" (1.575 m)     Body mass index is 24.98 kg/m². Wt Readings from Last 3 Encounters:   08/25/20 136 lb 9.6 oz (62 kg)   08/24/20 134 lb 11.2 oz (61.1 kg)   08/15/20 131 lb 9 oz (59.7 kg)     BP Readings from Last 3 Encounters:   08/25/20 128/74   08/24/20 120/64   08/15/20 (!) 102/50       Physical Exam  Vitals signs reviewed. Constitutional:       Appearance: She is well-developed. HENT:      Head: Normocephalic and atraumatic. Right Ear: External ear normal.      Left Ear: External ear normal.      Nose: Nose normal.   Eyes:      General: No scleral icterus. Conjunctiva/sclera: Conjunctivae normal.      Pupils: Pupils are equal, round, and reactive to light. Neck:      Musculoskeletal: Normal range of motion and neck supple. Thyroid: No thyromegaly. Vascular: No JVD. Cardiovascular:      Rate and Rhythm: Normal rate and regular rhythm. Heart sounds: No murmur. No friction rub. Pulmonary:      Effort: Pulmonary effort is normal.      Breath sounds: Normal breath sounds. No wheezing or rales. Chest:      Chest wall: No tenderness. mouth once daily 90 tablet 1    diclofenac sodium 1 % GEL Apply 4 g topically 4 times daily As needed for arthritis 2 Tube 5    aspirin 325 MG EC tablet Take 1 tablet by mouth daily 30 tablet 3    ipratropium-albuterol (DUONEB) 0.5-2.5 (3) MG/3ML SOLN nebulizer solution Inhale 3 mLs into the lungs every 6 hours while awake 30 vial 0    predniSONE (DELTASONE) 1 MG tablet Take 3 mg by mouth daily       acetaminophen (TYLENOL) 500 MG tablet Take 1,000 mg by mouth every 8 hours as needed for Pain       Multiple Vitamins-Minerals (CENTRUM SILVER ADULT 50+) TABS Take 1 tablet by mouth daily 90 tablet 1     No current facility-administered medications for this visit. Return in about 13 weeks (around 11/24/2020). Patient to return immediately for follow-up if having more problems and concerns.       Adam Fan MD

## 2020-08-27 ENCOUNTER — HOSPITAL ENCOUNTER (OUTPATIENT)
Dept: PET IMAGING | Age: 85
Discharge: HOME | End: 2020-08-27
Payer: MEDICARE

## 2020-08-27 PROCEDURE — 78815 PET IMAGE W/CT SKULL-THIGH: CPT

## 2020-08-27 PROCEDURE — A9552 F18 FDG: HCPCS | Performed by: INTERNAL MEDICINE

## 2020-08-27 PROCEDURE — 3430000000 HC RX DIAGNOSTIC RADIOPHARMACEUTICAL: Performed by: INTERNAL MEDICINE

## 2020-08-27 RX ORDER — FLUDEOXYGLUCOSE F 18 200 MCI/ML
14.5 INJECTION, SOLUTION INTRAVENOUS
Status: COMPLETED | OUTPATIENT
Start: 2020-08-27 | End: 2020-08-27

## 2020-08-27 RX ADMIN — FLUDEOXYGLUCOSE F 18 14.5 MILLICURIE: 200 INJECTION, SOLUTION INTRAVENOUS at 12:43

## 2020-09-08 RX ORDER — LEVOTHYROXINE SODIUM 0.1 MG/1
TABLET ORAL
Qty: 90 TABLET | Refills: 1 | Status: SHIPPED | OUTPATIENT
Start: 2020-09-08 | End: 2021-02-12

## 2020-09-08 NOTE — TELEPHONE ENCOUNTER
Dalia called asking for a Rx for the new dosage of the following medication to be called in for patient.     levothyroxine (SYNTHROID) 100 MCG tablet QD    Send to [facility] on [address]

## 2020-09-21 NOTE — TELEPHONE ENCOUNTER
Date of last visit:  8/25/2020  Date of next visit:  11/24/2020    Requested Prescriptions     Pending Prescriptions Disp Refills    clopidogrel (PLAVIX) 75 MG tablet [Pharmacy Med Name: CLOPIDOGREL 75 MG TABLET] 90 tablet 1     Sig: take 1 tablet by mouth once daily

## 2020-09-22 RX ORDER — CLOPIDOGREL BISULFATE 75 MG/1
TABLET ORAL
Qty: 90 TABLET | Refills: 1 | Status: SHIPPED | OUTPATIENT
Start: 2020-09-22 | End: 2021-03-29

## 2020-10-01 NOTE — DISCHARGE SUMMARY
[address] [place] [address]                               DISCHARGE SUMMARY    PATIENT NAME: Luz Pacheco                      :        1933  MED REC NO:   [id number]                           ROOM:       0017  ACCOUNT NO:   [de-identified]                           ADMIT DATE: 2020  PROVIDER:     Yolis Allen. Tyron Salinas M.D.            Sergo Bain: 08/15/2020    FINAL DIAGNOSES:  1. Partial small bowel obstruction. 2.  Ventral/umbilical hernia. 3.  Abdominal pain, vomiting and diarrhea. 4.  History of breast cancer with liver mets. 5.  Anxiety. 6.  Hypothyroidism. 7.  GERD. 8.  Coronary artery disease. DISCHARGE MEDICATIONS:  The patient's discharge home medications include  Phenergan 25 mg 1 tablet every 6 hours for nausea and vomiting, vitamin  D 50,000 units every week, Zocor 20 mg 1 tablet daily, Zoloft 50 mg 1  tablet daily, Imdur 30 mg 1 tablet daily, Plavix 75 mg 1 tablet daily,  vitamin D with calcium twice a day, loratadine 10 mg 1 tablet daily,  Lopressor 25 mg 1/2 tablet twice a day, aspirin 325 mg 1 tablet daily,  prednisone 1 mg daily, Centrum Silver 1 tablet daily, pantoprazole  40 mg 1 tablet daily, diclofenac gel 4 gm four times a day to the  arthritis affected area, DuoNeb 3 mL every 2 hours while awake, Tylenol  500 mg two tablets every 8 hours for pain p.r.n. FOLLOWUP:  The patient's followup will be by the home health nurse at  home. ACTIVITY:  As tolerated. DIET:  Soft diet. CONSULTANTS ON THIS HOSPITALIZATION:  Leslee Jacques MD from General  Surgery. HISTORY AND PHYSICAL:  The patient is an 70-year-old female, known to me  through the office being her primary care provider. The patient had  known history of breast cancer, had colostomy that subsequently had a  reversal by Dr. Ed Castillo.   The patient two nights ago prior to admission  developed nausea, vomiting and diarrhea 3 to 4 times daily

## 2020-10-02 NOTE — TELEPHONE ENCOUNTER
Date of last visit:  8/25/2020  Date of next visit:  11/24/2020    Requested Prescriptions     Pending Prescriptions Disp Refills    isosorbide mononitrate (IMDUR) 30 MG extended release tablet [Pharmacy Med Name: ISOSORBIDE MONONIT ER 30 MG TB] 90 tablet 1     Sig: take 1 tablet by mouth once daily

## 2020-10-06 RX ORDER — ISOSORBIDE MONONITRATE 30 MG/1
TABLET, EXTENDED RELEASE ORAL
Qty: 90 TABLET | Refills: 1 | Status: SHIPPED | OUTPATIENT
Start: 2020-10-06 | End: 2021-03-29

## 2020-10-19 ENCOUNTER — TELEPHONE (OUTPATIENT)
Dept: FAMILY MEDICINE CLINIC | Age: 85
End: 2020-10-19

## 2020-10-19 RX ORDER — PANTOPRAZOLE SODIUM 40 MG/1
TABLET, DELAYED RELEASE ORAL
Qty: 90 TABLET | Refills: 1 | Status: SHIPPED | OUTPATIENT
Start: 2020-10-19 | End: 2021-04-20

## 2020-10-19 NOTE — TELEPHONE ENCOUNTER
Date of last visit:  8/25/2020  Date of next visit:  11/24/2020    Requested Prescriptions     Pending Prescriptions Disp Refills    pantoprazole (PROTONIX) 40 MG tablet [Pharmacy Med Name: PANTOPRAZOLE SOD DR 40 MG TAB] 90 tablet 1     Sig: take 1 tablet by mouth once daily    sertraline (ZOLOFT) 50 MG tablet [Pharmacy Med Name: SERTRALINE HCL 50 MG TABLET] 180 tablet 1     Sig: take 1 tablet by mouth twice a day

## 2020-10-21 ENCOUNTER — HOSPITAL ENCOUNTER (OUTPATIENT)
Age: 85
Discharge: HOME | End: 2020-10-21
Payer: MEDICARE

## 2020-10-21 ENCOUNTER — HOSPITAL ENCOUNTER (OUTPATIENT)
Dept: GENERAL RADIOLOGY | Age: 85
Discharge: HOME | End: 2020-10-21
Payer: MEDICARE

## 2020-10-21 PROCEDURE — 73502 X-RAY EXAM HIP UNI 2-3 VIEWS: CPT

## 2020-10-22 ENCOUNTER — TELEPHONE (OUTPATIENT)
Dept: FAMILY MEDICINE CLINIC | Age: 85
End: 2020-10-22

## 2020-10-22 NOTE — TELEPHONE ENCOUNTER
[address] 77, niece called requesting results of hip xray. She is having severe pain.     Dalia can be reached at [phone]

## 2020-10-25 ENCOUNTER — HOSPITAL ENCOUNTER (EMERGENCY)
Age: 85
Discharge: HOME | End: 2020-10-25
Attending: EMERGENCY MEDICINE
Payer: MEDICARE

## 2020-10-25 VITALS
HEART RATE: 58 BPM | WEIGHT: 138 LBS | BODY MASS INDEX: 27.09 KG/M2 | RESPIRATION RATE: 20 BRPM | DIASTOLIC BLOOD PRESSURE: 56 MMHG | OXYGEN SATURATION: 95 % | SYSTOLIC BLOOD PRESSURE: 117 MMHG | HEIGHT: 60 IN | TEMPERATURE: 97.9 F

## 2020-10-25 LAB
BACTERIA: ABNORMAL
BILIRUBIN URINE: ABNORMAL
BLOOD, URINE: NEGATIVE
CASTS: ABNORMAL /LPF
CASTS: ABNORMAL /LPF
CHARACTER, URINE: CLEAR
COLOR: ABNORMAL
CRYSTALS: ABNORMAL
EPITHELIAL CELLS, UA: ABNORMAL /HPF
GLUCOSE, URINE: NEGATIVE MG/DL
ICTOTEST: NEGATIVE
KETONES, URINE: 15
LEUKOCYTE ESTERASE, URINE: ABNORMAL
MISCELLANEOUS LAB TEST RESULT: ABNORMAL
MUCUS: ABNORMAL
NITRITE, URINE: NEGATIVE
PH UA: 5 (ref 5–9)
PROTEIN UA: ABNORMAL MG/DL
RBC URINE: ABNORMAL /HPF
RENAL EPITHELIAL, UA: ABNORMAL
SPECIFIC GRAVITY UA: 1.03 (ref 1–1.03)
UROBILINOGEN, URINE: 1 EU/DL (ref 0–1)
WBC UA: ABNORMAL /HPF
YEAST: ABNORMAL

## 2020-10-25 PROCEDURE — 6360000002 HC RX W HCPCS: Performed by: STUDENT IN AN ORGANIZED HEALTH CARE EDUCATION/TRAINING PROGRAM

## 2020-10-25 PROCEDURE — 81001 URINALYSIS AUTO W/SCOPE: CPT

## 2020-10-25 PROCEDURE — 6370000000 HC RX 637 (ALT 250 FOR IP): Performed by: STUDENT IN AN ORGANIZED HEALTH CARE EDUCATION/TRAINING PROGRAM

## 2020-10-25 PROCEDURE — 99282 EMERGENCY DEPT VISIT SF MDM: CPT

## 2020-10-25 PROCEDURE — 96372 THER/PROPH/DIAG INJ SC/IM: CPT

## 2020-10-25 RX ORDER — OXYCODONE HYDROCHLORIDE AND ACETAMINOPHEN 5; 325 MG/1; MG/1
1 TABLET ORAL ONCE
Status: COMPLETED | OUTPATIENT
Start: 2020-10-25 | End: 2020-10-25

## 2020-10-25 RX ORDER — DEXAMETHASONE SODIUM PHOSPHATE 4 MG/ML
6 INJECTION, SOLUTION INTRA-ARTICULAR; INTRALESIONAL; INTRAMUSCULAR; INTRAVENOUS; SOFT TISSUE ONCE
Status: COMPLETED | OUTPATIENT
Start: 2020-10-25 | End: 2020-10-25

## 2020-10-25 RX ADMIN — OXYCODONE HYDROCHLORIDE AND ACETAMINOPHEN 1 TABLET: 5; 325 TABLET ORAL at 17:37

## 2020-10-25 RX ADMIN — DEXAMETHASONE SODIUM PHOSPHATE 6 MG: 4 INJECTION, SOLUTION INTRAMUSCULAR; INTRAVENOUS at 17:37

## 2020-10-25 ASSESSMENT — ENCOUNTER SYMPTOMS
VOMITING: 0
BACK PAIN: 1
WHEEZING: 0
STRIDOR: 0
CHEST TIGHTNESS: 0
DIARRHEA: 0
SHORTNESS OF BREATH: 0
ABDOMINAL PAIN: 1
RHINORRHEA: 0
NAUSEA: 0
CONSTIPATION: 0
COUGH: 0

## 2020-10-25 ASSESSMENT — PAIN SCALES - GENERAL
PAINLEVEL_OUTOF10: 6
PAINLEVEL_OUTOF10: 9

## 2020-10-25 ASSESSMENT — PAIN DESCRIPTION - PAIN TYPE: TYPE: ACUTE PAIN

## 2020-10-25 ASSESSMENT — PAIN DESCRIPTION - LOCATION: LOCATION: BACK

## 2020-10-25 NOTE — ED PROVIDER NOTES
[place] ENCOUNTER      Pt Name: Barrett Tong  MRN: [id number]  [place] 5/29/1933  Date of evaluation: 10/25/2020  Treating Resident Physician: Wilton Gama DO  Supervising Physician: Maru Story [address]       Chief Complaint   Patient presents with    Back Pain     History obtained from child and the patient. HISTORY OF PRESENT ILLNESS    HPI  Barrett Tong is a 80 y.o. female who presents to the emergency department for evaluation of right low back pain. She states it has been occurring for the past 2-3 weeks, but more severe this past week describing it as sharp, throbbing, and achy rated 9/10. She states it starts at the back and travels to her right hip and into her groin. It is worse with movement and she has been unable to ambulate much recently due to the pain. She denies any falls or injuries. She sees Dr. Tracy Mehta as her primary care physician and a XR Right Hip was done on 10/21/20 which shows degenerative changes of the right hip, mild deformity of inferior right pubic ramus may be related to prior remote fracture seen on prior examination; no acute fractures noted. She has been taking Norco 5-325mg with minimal relief of her pain. She has also tried Voltaren gel, heat, and ice without much relief. She denies fevers, headaches, lightheadedness, chest pain, shortness of breath, nausea, vomiting, swelling, weakness, dysuria, frequency, or hematuria. She does endorse right sided pain more on her ribs than abdomen. The patient has no other acute complaints at this time. REVIEW OF SYSTEMS   Review of Systems   Constitutional: Negative for appetite change, chills, diaphoresis, fatigue and fever. HENT: Negative for congestion, postnasal drip and rhinorrhea. Respiratory: Negative for cough, chest tightness, shortness of breath, wheezing and stridor. Cardiovascular: Negative.     Gastrointestinal: Positive for abdominal every 6 hours while awake, Disp: 30 vial, Rfl: 0    predniSONE (DELTASONE) 1 MG tablet, Take 3 mg by mouth daily , Disp: , Rfl:     acetaminophen (TYLENOL) 500 MG tablet, Take 1,000 mg by mouth every 8 hours as needed for Pain , Disp: , Rfl:     Multiple Vitamins-Minerals (CENTRUM SILVER ADULT 50+) TABS, Take 1 tablet by mouth daily, Disp: 90 tablet, Rfl: 1    SOCIAL HISTORY     Social History     Social History Narrative    Not on file     Social History     Tobacco Use    Smoking status: Former Smoker     Packs/day: 0.50     Types: Cigarettes    Smokeless tobacco: Never Used   Substance Use Topics    Alcohol use: No    Drug use: No     ALLERGIES     Allergies   Allergen Reactions    Bactrim [Sulfamethoxazole-Trimethoprim] Swelling     Of tongue    Demerol Rash     nausea    Pcn [Penicillins] Rash     FAMILY HISTORY     Family History   Problem Relation Age of Onset    Cancer Sister 61        breast    Cancer Brother 79        lung    Cancer Brother 68        colon    Cancer Son 48        lung     PREVIOUS RECORDS   Previous records reviewed: Yes    PHYSICAL EXAM     ED Triage Vitals [10/25/20 1559]   BP Temp Temp Source Pulse Resp SpO2 Height Weight   (!) 117/56 97.9 °F (36.6 °C) Oral 58 20 95 % 5' (1.524 m) 138 lb (62.6 kg)     Additional Vital Signs:  Vitals:    10/25/20 1559   BP: (!) 117/56   Pulse: 58   Resp: 20   Temp: 97.9 °F (36.6 °C)   SpO2: 95%     Physical Exam  Vitals signs reviewed. Constitutional:       General: She is not in acute distress. Appearance: Normal appearance. She is not ill-appearing, toxic-appearing or diaphoretic. HENT:      Head: Normocephalic. Nose: Nose normal.      Mouth/Throat:      Mouth: Mucous membranes are moist.      Pharynx: Oropharynx is clear. Eyes:      Extraocular Movements: Extraocular movements intact. Conjunctiva/sclera: Conjunctivae normal.   Neck:      Musculoskeletal: Normal range of motion and neck supple.    Cardiovascular: Rate and Rhythm: Normal rate and regular rhythm. Pulses: Normal pulses. Heart sounds: Normal heart sounds. Pulmonary:      Effort: Pulmonary effort is normal.      Breath sounds: Normal breath sounds. Abdominal:      Palpations: Abdomen is soft. Tenderness: There is no right CVA tenderness, left CVA tenderness or guarding. Musculoskeletal:      Right hip: She exhibits decreased range of motion, decreased strength, tenderness and bony tenderness. She exhibits no swelling, no crepitus and no deformity. Back:    Skin:     General: Skin is warm and dry. Capillary Refill: Capillary refill takes less than 2 seconds. Neurological:      General: No focal deficit present. Mental Status: She is alert and oriented to person, place, and time. Psychiatric:         Mood and Affect: Mood normal.         Behavior: Behavior normal.         Thought Content: Thought content normal.         Judgment: Judgment normal.       MEDICAL DECISION MAKING   Initial Assessment:  1. Low back pain  2. Right hip pain  3. Trochanteric bursitis    Plan:  As patient had recent imaging on 10/21/20 and no recent falls or injuries, repeat imaging is not warranted at this time. Will obtain U/A to rule out urinary tract infection due to her right sided low back/flank pain. Gave patient Percocet 5-325mg and Decadron 6mg IM. Patient's pain is significantly improved on reassessment. U/A not significant for UTI. Patient was referred to [facility] for further evaluation and management. Will discharge home in stable condition.     ED RESULTS   Laboratory results:  Labs Reviewed   URINALYSIS WITH MICROSCOPIC - Abnormal; Notable for the following components:       Result Value    Bilirubin Urine SMALL (*)     Ketones, Urine 15 (*)     Protein, UA TRACE (*)     Leukocyte Esterase, Urine SMALL (*)     All other components within normal limits   ICTOTEST, URINE     Radiologic studies results:  No orders to display     ED Medications administered this visit:   Medications   Dexamethasone Sodium Phosphate injection 6 mg (6 mg Intramuscular Given 10/25/20 1737)   oxyCODONE-acetaminophen (PERCOCET) 5-325 MG per tablet 1 tablet (1 tablet Oral Given 10/25/20 1737)         ED COURSE        Strict return precautions and follow up instructions were discussed with the patient prior to discharge, with which the patient agrees. MEDICATION CHANGES     New Prescriptions    No medications on file     FINAL DISPOSITION     Final diagnoses:   Trochanteric bursitis, right hip     Condition: stable  Dispo: Discharge to home    This transcription was electronically signed. Parts of this transcriptions may have been dictated by use of voice recognition software and electronically transcribed, and parts may have been transcribed with the assistance of an ED scribe. The transcription may contain errors not detected in proofreading. Please refer to my supervising physician's documentation if my documentation differs.     Electronically Signed: Rivas Elias, 10/25/20, 6:51 PM       Rivas Elias DO  Resident  10/25/20 6744

## 2020-10-27 ENCOUNTER — CARE COORDINATION (OUTPATIENT)
Dept: FAMILY MEDICINE CLINIC | Age: 85
End: 2020-10-27

## 2020-10-27 NOTE — CARE COORDINATION
Ambulatory Care Coordination  ED Follow up Call    Reason for ED visit: Back pain  Status:     improved    Did you call your PCP prior to going to the ED? No      Did you receive a discharge instructions from the Emergency Room? Yes  Review of Instructions:     Understands what to report/when to return?:  Yes   Understands discharge instructions?:  Yes   Following discharge instructions?:  Yes   If not why? Are there any new complaints of pain? No  New Pain Meds? No    Constipation prophylaxis needed? No    If you have a wound is the dressing clean, dry, and intact? N/A  Understands wound care regimen? N/A    Are there any other complaints/concerns that you wish to tell your provider? No    FU appts/Provider:    Future Appointments   Date Time Provider Irene Shah   11/24/2020  2:10 PM MD TAMARA [place] [facility]     These questions were answered by her niece   Randi. SHe said they were probably going to wait to follow up with Ortho until they get input from Dr Nellene Bamberger. Latanya received an injection in the ED and is feeling much better. Randi was unable to reconcile the meds because she did not have the list with her but she said it should still be the same. New Medications?:   No      Medication Reconciliation by phone - No  Understands Medications? Yes  Taking Medications? Yes  Can you swallow your pills? Yes    Any further needs in the home i.e. Equipment?   No    Link to services in community?:  N/A   Which services:

## 2020-10-29 ENCOUNTER — TELEPHONE (OUTPATIENT)
Dept: FAMILY MEDICINE CLINIC | Age: 85
End: 2020-10-29

## 2020-11-06 ENCOUNTER — TELEPHONE (OUTPATIENT)
Dept: FAMILY MEDICINE CLINIC | Age: 85
End: 2020-11-06

## 2020-11-06 NOTE — TELEPHONE ENCOUNTER
Home Health Aid tested positive for COVID. Patient is not having any symptoms but would like to be tested.     Uses [facility] [address]    [facility] [place]

## 2020-11-17 NOTE — TELEPHONE ENCOUNTER
[facility] called req ref of Loratadine to [facility].    Last appt   8/25/20    Next appt   11/24/20

## 2020-11-19 RX ORDER — LORATADINE 10 MG/1
TABLET ORAL
Qty: 90 TABLET | Refills: 1 | Status: SHIPPED | OUTPATIENT
Start: 2020-11-19 | End: 2021-05-21

## 2020-12-08 ENCOUNTER — OFFICE VISIT (OUTPATIENT)
Dept: FAMILY MEDICINE CLINIC | Age: 85
End: 2020-12-08

## 2020-12-08 VITALS
WEIGHT: 135.25 LBS | SYSTOLIC BLOOD PRESSURE: 136 MMHG | BODY MASS INDEX: 26.55 KG/M2 | RESPIRATION RATE: 12 BRPM | HEART RATE: 74 BPM | TEMPERATURE: 96.4 F | DIASTOLIC BLOOD PRESSURE: 82 MMHG | HEIGHT: 60 IN

## 2020-12-08 PROCEDURE — G8417 CALC BMI ABV UP PARAM F/U: HCPCS | Performed by: EMERGENCY MEDICINE

## 2020-12-08 PROCEDURE — 1090F PRES/ABSN URINE INCON ASSESS: CPT | Performed by: EMERGENCY MEDICINE

## 2020-12-08 PROCEDURE — 90688 IIV4 VACCINE SPLT 0.5 ML IM: CPT | Performed by: EMERGENCY MEDICINE

## 2020-12-08 PROCEDURE — 4040F PNEUMOC VAC/ADMIN/RCVD: CPT | Performed by: EMERGENCY MEDICINE

## 2020-12-08 PROCEDURE — 99213 OFFICE O/P EST LOW 20 MIN: CPT | Performed by: EMERGENCY MEDICINE

## 2020-12-08 PROCEDURE — G8427 DOCREV CUR MEDS BY ELIG CLIN: HCPCS | Performed by: EMERGENCY MEDICINE

## 2020-12-08 PROCEDURE — 1036F TOBACCO NON-USER: CPT | Performed by: EMERGENCY MEDICINE

## 2020-12-08 PROCEDURE — G0008 ADMIN INFLUENZA VIRUS VAC: HCPCS | Performed by: EMERGENCY MEDICINE

## 2020-12-08 PROCEDURE — 1123F ACP DISCUSS/DSCN MKR DOCD: CPT | Performed by: EMERGENCY MEDICINE

## 2020-12-08 RX ORDER — ACETAMINOPHEN/DIPHENHYDRAMINE 500MG-25MG
TABLET ORAL
Status: ON HOLD | COMMUNITY
Start: 2020-10-13 | End: 2022-05-19 | Stop reason: HOSPADM

## 2020-12-08 RX ORDER — PSYLLIUM HUSK 3.4 G/7G
POWDER ORAL
Status: ON HOLD | COMMUNITY
Start: 2020-11-19 | End: 2021-07-20 | Stop reason: HOSPADM

## 2020-12-08 ASSESSMENT — ENCOUNTER SYMPTOMS
BACK PAIN: 0
TROUBLE SWALLOWING: 0
SINUS PRESSURE: 0
RHINORRHEA: 0
WHEEZING: 0
VOICE CHANGE: 0
ABDOMINAL PAIN: 0
VOMITING: 0
NAUSEA: 0
COUGH: 0
SORE THROAT: 0
CHEST TIGHTNESS: 0
DIARRHEA: 0
CONSTIPATION: 0
SHORTNESS OF BREATH: 0

## 2020-12-08 NOTE — PROGRESS NOTES
Chief complaint: CAD  Chief Complaint   Patient presents with   • Heart Problem     PCP:  Sakshi Lazo MD       Vitals:  /68   Pulse 69   Resp 14   Ht 5' 7\" (1.702 m)   Wt 80.7 kg   SpO2 97%   BMI 27.88 kg/m²     HISTORY OF PRESENT ILLNESS     67 yo male with history of STEMI with REANNA to RCA and OM1, elective PCI with REANNA to LAD, and hypertension.  Hospitalized  8/30 for chest pain. Shortly after arrival to the emergency department he went into a V.Fib cardiac arrest. CPR was begun and he was shocked once and converted to a sinus rhythm with ROSC and return of consciousness.  Post cardiac arrest his EKG revealed an infero-posterior STEMI.  Cardiac cath revealed:  Acute myocardial infarction due to totally occluded first marginal branch of the circumflex artery which was successfully treated with drug-eluting Synergy stent  Severe 95% ulcerated lesion in the proximal nondominant right coronary artery which was successfully stented with a Synergy stent.  At that time, he was noted to have 75% calcified lesion of the LAD.  Electively admitted for PCI to LAD 9/20/2016.     Pt here for 6 month follow up. He is feeling well. Continues to fatigue easily and some muscle cramping. Bruises easily.  Denies chest pain, SOB, lightheadedness, palpitations.  Works full time. Walks 2x/week for 20 minutes              Heart Problem   Pertinent negatives include no chest pain.   :           PAST MEDICAL, FAMILY AND SOCIAL HISTORY     Reviewed in EPIC      Allergies:  ALLERGIES:  No Known Allergies        REVIEW OF SYSTEMS     Review of Systems   Constitutional: Negative for activity change.   HENT: Negative.    Eyes: Negative.    Respiratory: Negative for chest tightness, shortness of breath and wheezing.    Cardiovascular: Negative for chest pain, palpitations and leg swelling.   Gastrointestinal: Negative for constipation and diarrhea.   Endocrine: Negative.    Genitourinary: Negative.    Skin: Negative.   Visit Date: 12/8/2020    Subjective:    Wild Harrison is a 80 y. o.female who presents today for:  Chief Complaint   Patient presents with    Check-Up         HPI:     HPI     doing well, No SOB, No chest pain , No fatigue.  No nausea nor abdominal pain    Had diarrhea on and off wondering about celiac disease      CurrentHome Medications:  Current Outpatient Medications   Medication Sig Dispense Refill    RA ASPIRIN EC ADULT LOW ST 81 MG EC tablet take 1 tablet by mouth once daily      Bioflavonoid Products (RA VITAMIN C CR) TBCR as directed      loratadine (RA LORATADINE) 10 MG tablet take 1 tablet by mouth once daily 90 tablet 1    diclofenac sodium (VOLTAREN) 1 % GEL Apply 4 g topically 3 times daily as needed for Pain 200 g 2    pantoprazole (PROTONIX) 40 MG tablet take 1 tablet by mouth once daily 90 tablet 1    sertraline (ZOLOFT) 50 MG tablet take 1 tablet by mouth twice a day 180 tablet 1    isosorbide mononitrate (IMDUR) 30 MG extended release tablet take 1 tablet by mouth once daily 90 tablet 1    clopidogrel (PLAVIX) 75 MG tablet take 1 tablet by mouth once daily 90 tablet 1    levothyroxine (SYNTHROID) 100 MCG tablet take 1 tablet by mouth once daily 90 tablet 1    metoprolol tartrate (LOPRESSOR) 25 MG tablet take 1/2 tablet by mouth twice a day 90 tablet 1    vitamin D (ERGOCALCIFEROL) 1.25 MG (86084 UT) CAPS capsule take 1 capsule by mouth every week      promethazine (PHENERGAN) 25 MG tablet Take 1 tablet by mouth every 6 hours as needed for Nausea (and vomiting) 12 tablet 0    simvastatin (ZOCOR) 20 MG tablet Take 1 tablet by mouth nightly 90 tablet 1    Calcium Carbonate-Vitamin D (OYSTER SHELL CALCIUM/D) 500-200 MG-UNIT TABS take 1 tablet by mouth twice a day 180 tablet 3    aspirin 325 MG EC tablet Take 1 tablet by mouth daily 30 tablet 3    ipratropium-albuterol (DUONEB) 0.5-2.5 (3) MG/3ML SOLN nebulizer solution Inhale 3 mLs into the lungs every 6 hours while awake 30 vial 0   Allergic/Immunologic: Negative.    Neurological: Negative for dizziness, seizures and syncope.   Hematological: Negative.    Psychiatric/Behavioral: Negative.    All other systems reviewed and are negative.      PHYSICAL EXAM     Physical Exam   Constitutional: He is oriented to person, place, and time. He appears well-developed and well-nourished.   HENT:   Head: Normocephalic and atraumatic.   Eyes: Conjunctivae and EOM are normal. Pupils are equal, round, and reactive to light.   Neck: Normal range of motion. Neck supple. No JVD present. No thyromegaly present.   Cardiovascular: Normal rate, regular rhythm and intact distal pulses.  Exam reveals no gallop and no friction rub.    Murmur heard.   Systolic murmur is present with a grade of 2/6   2/6 ESM   Pulmonary/Chest: Effort normal and breath sounds normal. No respiratory distress. He has no wheezes. He has no rales. He exhibits no tenderness.   Abdominal: Soft.   Musculoskeletal: Normal range of motion. He exhibits no edema or tenderness.   Neurological: He is alert and oriented to person, place, and time.   Skin: Skin is warm and dry.   Psychiatric: He has a normal mood and affect. His behavior is normal. Judgment and thought content normal.       STEMI cardiac catheterization 8/30/2016:  · Acute myocardial infarction due to totally occluded first marginal branch of the circumflex artery which was successfully treated with drug-eluting Synergy stent  · Severe 95% ulcerated lesion in the proximal nondominant right coronary artery which was successfully stented with a Synergy stent.  · Severe 75% calcified lesion in the LAD which will be intervened upon at a later date    Echo 8/31/2016  Normal left ventricular cavity size. Mildly increased left ventricular wall thickness.  Apical lateral wall hypokinesis with otherwise yadira normally.  Left ventricular ejection fraction, 63 %. Global longitudinal strain -14.7 .  Evidence for Grade II/IV LV diastolic   predniSONE (DELTASONE) 1 MG tablet Take 3 mg by mouth daily       acetaminophen (TYLENOL) 500 MG tablet Take 1,000 mg by mouth every 8 hours as needed for Pain       Multiple Vitamins-Minerals (CENTRUM SILVER ADULT 50+) TABS Take 1 tablet by mouth daily 90 tablet 1     No current facility-administered medications for this visit. Subjective:      Review of Systems   Constitutional: Negative for appetite change, chills, diaphoresis, fatigue and fever. HENT: Negative for congestion, ear pain, postnasal drip, rhinorrhea, sinus pressure, sneezing, sore throat, trouble swallowing and voice change. Respiratory: Negative for cough, chest tightness, shortness of breath and wheezing. Cardiovascular: Negative for chest pain, palpitations and leg swelling. Gastrointestinal: Negative for abdominal pain, constipation, diarrhea, nausea and vomiting. Musculoskeletal: Negative for arthralgias, back pain, joint swelling, myalgias, neck pain and neck stiffness. Neurological: Negative for dizziness, syncope, weakness, light-headedness, numbness and headaches. Objective:     /82 (Site: Right Upper Arm, Position: Sitting, Cuff Size: Medium Adult)   Pulse 74   Temp 96.4 °F (35.8 °C) (Skin)   Resp 12   Ht 5' (1.524 m)   Wt 135 lb 4 oz (61.3 kg)   LMP  (LMP Unknown)   BMI 26.41 kg/m²   BP Readings from Last 3 Encounters:   12/08/20 136/82   10/25/20 (!) 117/56   08/25/20 128/74     Wt Readings from Last 3 Encounters:   12/08/20 135 lb 4 oz (61.3 kg)   10/25/20 138 lb (62.6 kg)   08/25/20 136 lb 9.6 oz (62 kg)       Physical Exam  Vitals signs reviewed. Constitutional:       Appearance: She is well-developed. HENT:      Head: Normocephalic and atraumatic. Right Ear: External ear normal.      Left Ear: External ear normal.      Nose: Nose normal.   Eyes:      General: No scleral icterus.      Conjunctiva/sclera: Conjunctivae normal.      Pupils: Pupils are equal, round, and reactive to dysfunction, moderately elevated filling pressures.  Normal size atrium and right ventricle, with normal RV systolic function.  Mild mitral valve and aortic valve sclerosis without stenosis.  Mild MV, AV and TV regurgitation.  Normal estimated pulmonary artery systolic pressure 28 mmHg.    Cardiac cath 9/20/2016  · Patent stent in OM1  · Calcified lesion and proximal LAD with IFR of 0.80.  · Successful intervention on the LAD stenosis with angioplasty followed by drug-eluting Synergy stent implantation       Recent Labs  Lab 01/12/17  0756 09/21/16  1422 09/01/16  0405 08/31/16  1510 08/31/16  0510 08/30/16  2147 08/30/16  1759 08/30/16  1620   HGB  --  12.8*  --   --  13.1  --  13.9 15.8   BUN 22* 16 18  --   --  18  --  22*   CREATININE 1.06 0.98 0.95  --   --  0.85  --  1.15   POTASSIUM 4.3 4.2 4.4 3.5 3.6 4.4  --  2.4*   CHOLESTEROL 175  --   --   --   --  218*  --   --    HDL 52  --   --   --   --  54  --   --    CHOHDL 3.4  --   --   --   --  4.0  --   --    TRIGLYCERIDE 99  --   --   --   --  83  --   --    CALCLDL 103  --   --   --   --  147*  --   --    AST 24  --   --   --   --   --   --   --    GPT 35  --   --   --   --   --   --   --    INR  --   --   --   --   --   --   --  1.0             ASSESSMENT/PLAN       Coronary Artery Disease:  Status post STEMI  with V.Fib cardiac arrest in August 2016: S/P cath with REANNA stents to the OM1 and RCA. LAD with 75% calcified stenosis in its proximal segment.  Elective PCI to LAD calcified lesion September 2016  Chest pain free.  Due to fatigue, decrease metoprolol to 25 mg BID.  Continue with regular exercise.  Continue with DAPT, lisinopril, metoprolol and statin.  Will stop Brilinta due to bruising and start Plavix 75 mg daily  Check NM stress test in 9/2017.    Hypertension, controlled on current regimen    Hyperlipidemia: continue with Lipitor.    Follow up in one year.    On 7/11/2017, Ella JOAQUIN RN scribed the services personally performed by Augustine BARTON  light.   Neck:      Musculoskeletal: Normal range of motion and neck supple. Thyroid: No thyromegaly. Vascular: No JVD. Cardiovascular:      Rate and Rhythm: Normal rate and regular rhythm. Heart sounds: No murmur. No friction rub. Pulmonary:      Effort: Pulmonary effort is normal.      Breath sounds: Normal breath sounds. No wheezing or rales. Chest:      Chest wall: No tenderness. Abdominal:      General: Bowel sounds are normal.      Palpations: Abdomen is soft. There is no mass. Tenderness: There is no abdominal tenderness. Lymphadenopathy:      Cervical: No cervical adenopathy. Skin:     Findings: No rash. Neurological:      Mental Status: She is alert and oriented to person, place, and time. Psychiatric:         Behavior: Behavior is cooperative. Assessment:         Diagnosis Orders   1. Diarrhea, unspecified type     2. Hypothyroidism due to acquired atrophy of thyroid     3. Hypercholesteremia     4. Gastroesophageal reflux disease without esophagitis     5. Need for influenza vaccination  INFLUENZA, QUADV, 0.5ML, 6 MO AND OLDER, IM, MDV, (Donte Rdz)   6. Current chronic use of systemic steroids         Plan:      Medications Prescribed:  No orders of the defined types were placed in this encounter. Orders Placed:  Orders Placed This Encounter   Procedures    INFLUENZA, QUADV, 0.5ML, 6 MO AND OLDER, IM, MDV, (Donte Rdz)        Return in about 3 months (around 3/8/2021). Discussed use, benefit, and side effects of prescribedmedications. All patient questions answered. Pt voiced understanding. Instructedto continue current medications, diet and exercise. Patient agreed with treatmentplan. MD Bessy      Note: The documentation recorded by the scribe accurately and completely reflects the service(s) I personally performed and the decisions made MD Augustine Rincon MD   Cardiology

## 2020-12-15 RX ORDER — SIMVASTATIN 20 MG
TABLET ORAL
Qty: 90 TABLET | Refills: 1 | Status: SHIPPED | OUTPATIENT
Start: 2020-12-15 | End: 2021-03-09

## 2020-12-15 NOTE — TELEPHONE ENCOUNTER
Date of last visit:  12/8/2020  Date of next visit:  3/9/2021    Requested Prescriptions     Pending Prescriptions Disp Refills    simvastatin (ZOCOR) 20 MG tablet [Pharmacy Med Name: SIMVASTATIN 20 MG TABLET] 90 tablet 1     Sig: take 1 tablet by mouth every evening

## 2020-12-28 NOTE — PROGRESS NOTES
[address] [facility]  INPATIENT OCCUPATIONAL THERAPY  [facility] SKILLED NURSING UNIT  DAILY NOTE    Time:  Time In: 0800  Time Out: 0856  Timed Code Treatment Minutes: 64 Minutes  Minutes: 56        Date: 2019  Patient Name: Nohemi Sutherland,   Gender: female      Room: [facility]/071-A  MRN: [id number]  : 1933  (80 y.o.)  Referring Practitioner: Dr. Polina Olson, Dr. Burtis Jeans. Bradley Attending  Diagnosis: pubic ramus fracture RIght, closed  Additional Pertinent Hx: The patient is a 80 y.o. female  With multiple medical comorbidities who was admitted to ortho services for treatment of a right pelvic fracture. Patient has a history of breast CA with mets to the liver and is currently undergoing weekly chemotherapy. She completed a treatment yesterday and notes that she usually gets dizzy afterwards. She reports a fall in her home after an episode of dizziness yesterday. She fell onto her right side landing on the right hip. She was unable to bear weight afterwards. She denies hitting her head and no LOC. X-ray shows a right superior and inferior pubic rami fracture. Patient lives alone and is typically independent. She does ambulate with a walker. CT negative for R femur fracture.     Past Medical History:   Diagnosis Date    Anxiety     Blind right eye     Breast cancer metastasized to liver ([address] 05/10/2016    Liver lesion noted     Carotid stenosis      Left ICA 25%    Colostomy in place [facility]) 2016    Degenerative arthritis of cervical spine     GERD (gastroesophageal reflux disease)     Hypercholesteremia     Hypoestrogenism     Hypothyroidism     Lumbago     Lumbosacral spinal stenosis 2019    MDRO (multiple drug resistant organisms) resistance     pt stated cleared    Personal history of cardiac dysrhythmia     Sigmoid diverticulosis     Spondylolisthesis of lumbosacral region     Steroid-induced hyperglycemia     Subclavian artery stenosis ([address]     left    GASTROENTEROLOGY CONSULT  [facility] HOSPITALIST SERVICE    Patient: Katie Jaeger Today's Date: 12/28/2020   YOB: 1979 Date of Admission: 12/27/2020   MR Number: [id number] Attending Physician: Monty Treviño MD     Primary Care Provider: Verify Pcp      ASSESSMENT & ACTIVE ISSUES:  Active Problems:    Crohn's disease of both small and large intestine (CMS/HCC)    Chronic abdominal pain    Small bowel obstruction (CMS/HCC)         PLAN:  · Records reviewed.  · Labs, imaging scans, previous records were all reviewed in detail.  · Agree with admitting the patient to the hospital.  · Plan to continue NG tube to suction.  · Continue IV fluids.  · Plan to keep her NPO.  · Plan to follow her labs in a.m..  · Continue IV antibiotics.  · Continue PPIs.  · Plan to start her on Solu-Medrol IV.   · Plan to discontinue oral prednisone.  · Appreciate surgery input.  If there is no improvement in next few hours, may have to consider surgical options.  · I will follow her closely during her length of stay in the hospital.  · Thank you for allowing me to take part in her care.      CHIEF COMPLAINTS / REASON FOR CONSULTATION:  Chief Complaint   Patient presents with   • Abdominal Pain       PRESENT HISTORY:  Katie Jaeger is a 41 year old is a pleasant female.    I am seeing this patient at the request of Dr. Jackson for consultation for above-mentioned symptoms.    She has a history of Crohn's diagnosed at age of 25.      She had ileocecal resection with ileal ascending colon anastomosis and abscess removal on 12/01/2011, by Dr. Schultz.      Patient started her Humira on 07/28/2014. She was on Humira and clinically doing well. She stopped Humira because she lost insurance.     Humira restarted in 08/2018 again.    She was admitted the hospital with a complains of abdominal pain.    She has history of chronic abdominal pain.  She has been on pain medication.  She took pain medication as the intensity and severity of  Superior and Inferior Pubic ramus fracture, right, closed, initial encounter ([facility] 05/21/2019    SVT (supraventricular tachycardia) ([facility] 6/07    Temporal arteritis [facility]     Vertebral artery occlusion     left    Vitamin D deficiency 06/2017     Past Surgical History:   Procedure Laterality Date    CARDIAC CATHETERIZATION  5/07    CATARACT REMOVAL  right 1/08; left 2/08    CHOLECYSTECTOMY  1/03    COLONOSCOPY  11/06    COLOSTOMY  09/04/2018    REVERSAL OF COLOSTOMY    DILATION AND CURETTAGE OF UTERUS      ENDOSCOPY, COLON, DIAGNOSTIC      EYE SURGERY      EYE SURGERY Right 11/06/2017    Dr Lopez in [address]      partial    OTHER SURGICAL HISTORY  05/27/2016    robotic assisted laparoscopic anterior colon resection and end colostomy    VT OFFICE/OUTPT VISIT,PROCEDURE ONLY N/A 9/4/2018    COLOSTOMY REVERSAL AND PARASTOMAL HERNIA REPAIR performed by Jeremy Martinez MD at [address] [facility]  2002    UPPER GASTROINTESTINAL ENDOSCOPY  11/06       Restrictions/Precautions:  Weight Bearing, Fall Risk     Right Lower Extremity Weight Bearing: Weight Bearing As Tolerated       Prior Level of Function:  ADL Assistance: Needs assistance(HH aide assists pt 5 days a week x 2 hours.)  Homemaking Assistance: Needs assistance(aide assists with housekeeping and grocery shopping.)  Ambulation Assistance: Independent  Transfer Assistance: Independent  Additional Comments: Pt states amb with RW, O2 at night; has aide services 5 days/week that assists with laundry, showers and grocery shopping.     Subjective     Subjective: Patient seated in bedside chair upon arrival. Agreeable to OT session  Overall Orientation Status: Within Functional Limits         Pain:  Pain Assessment  Patient Currently in Pain: Yes(with movement)  Pain Assessment: 0-10  Pain Level: 8  Pain Location: Hip  Pain Orientation: Right       Objective  Overall Cognitive Status: Exceptions  Cognition Comment: the pain was progressively increasing in getting worse.    She took extra pain medications and did not notice any improvement in her symptoms of pain.  Pain was getting worse.  She noticed her abdomen was getting more distended and bloated.    She denies any nausea or vomiting at the time of admission.  She had mild symptoms of constipation.  She has not had a bowel movement for 2 days before admission.    Initially she was passing some gas.  Then she felt, she is not passing any gas.  Her abdominal distention was progressively increasing in getting worse.  This was associated with increasing, diffuse, generalized abdominal pain.    She came to the hospital for further evaluation and management.  CT scan of the abdomen and pelvis revealed findings consistent with stable terminal ileum inflammation.  Ileus versus early small-bowel obstruction.    She has an NG tube in place.  She still has diffuse, generalized abdominal distention.  She is passing minimal gas.    TB QuantiFERON blood test 05/18/2020.  Negative.  01/23/2018. TB QuantiFERON blood test negative.  07/03/2018. Chronic hepatitis panel negative.      CT abdomen and pelvis with contrast 12/27/2020, revealed  Grossly stable terminal ileum inflammation with concomitant ileus versus early small bowel obstruction. Trace free fluid in the pelvis which is likely reactive, no free air.  Other findings, as above.    Fluoro small bowel series 01/06/2020,  IMPRESSION: Narrowing and irregularity of the distal 4 cm of the terminal ileum, which when correlated with recent CT scan most likely represents some active Crohn's in this area. Please see body of report for complete details, as well as patient's prior CT report from 1/3/2020.    CT abdomen pelvis with contrast 01/03/2020,  Interval dilation of small bowel with transition point at the terminal ileum, findings suggestive of ileus versus less likely early bowel obstruction. Grossly stable hyperenhancement of the  distal ileum mucosa compatible with patient's known Crohn's disease. Small ascites in the abdomen and pelvis.   Probable small cysts in the liver and right kidney.   Other findings, as above.    CT abdomen and pelvis with contrast 04/20/2019, revealed  1. There is mild circumferential wall thickening of the terminal ileum which is likely related to the patient's Crohn's disease. Findings may reflect mild low-grade or chronic inflammation. There is very minimal if any surrounding mesenteric stranding.  2. Multiple right lower quadrant lymph nodes likely reactive or inflammatory in nature.  3. Redemonstration of a low-density lesion within the superior pole the right kidney and interval development of a subcentimeter hypodense lesion within the right hepatic lobe. These could reflect small cysts however are too small to definitively characterize. These could be definitively assessed on a nonemergent basis with ultrasound.  4. Additional findings as discussed.     CT abdomen and pelvis with contrast on 04/11/2018, revealed  1. CT scan of the abdomen and pelvis showing no current small bowel dilatation, with an NG tube in place. Thickening of the terminal ileum is again seen and some thickening of the sigmoid colon above the uterus is currently suggested. No focal pelvic abscess collections are seen.  2. A new small right pleural effusion is seen.  3. Mildly increasing pelvic ascites is seen, along with a small amount of perisplenic ascites now noted.  4. A 1.8 x 1.7 cm right inguinal fluid collection most likely represents some free pelvic fluid extending into a femoral hernia.     CT abdomen and pelvis with contrast on 04/05/2018, revealed  1. Findings suggest active Crohn's disease with 2 areas of narrowing and mucosal enhancement causing moderate small bowel obstruction. There is a long segment of narrowing and enhancement seen in the mid ileum and a more focal area of mild stenosis and mucosal enhancement seen in  functional mobility , Decreased ADL status, Decreased endurance, Decreased strength, Decreased balance, Decreased safe awareness, Decreased high-level IADLs  Prognosis: Good    Discharge Recommendations:  Discharge Recommendations: Patient would benefit from continued therapy after discharge    Patient Education:  Patient Education: safety with transfers and mobility, ADL strateiges- would benefit from education on use of LHAE, BUE exercises    Equipment Recommendations: Other: Pt would benefit from new shower chair as hers is approx 8 yrs old, not sturdy and screws are loosening. May benefit from [facility]. Safety:  Safety Devices in place: Yes  Type of devices: Call light within reach, Chair alarm in place, Left in chair, Gait belt       Plan:  Times per week: 6x  Current Treatment Recommendations: Strengthening, Balance Training, Endurance Training, Functional Mobility Training, Patient/Caregiver Education & Training, Self-Care / ADL, Safety Education & Training, Home Management Training, Equipment Evaluation, Education, & procurement    Goals:  Patient goals : decrease pain to be able to go home soon.     Short term goals  Time Frame for Short term goals: 1 week  Short term goal 1: Pt will complete ADL routine with no cues for adapted tech and LHAE PRN to increase independende in self care tasks   Short term goal 2: Pt will complete functional ambulation to and from the BR with CGA and no cues for RW safety to increase independence in toileting routine  Short term goal 3: Pt will complete 1 step light homemaking tasks with no > min cues for attention to task to increase independence in clothng retrieval.   Short term goal 4: Pt will complete standing ADL with CGA with 1 UE release for > 3 minutes and no > 1 vc to attend to task to increase ease with sink side grooming  Short term goal 5: Pt will demo indep wtih BUE light strenthenng HEP  for improved UB strength and activity tolerance needed for ease with the distal ileum near the ileocecal junction. There is moderate to severe small bowel dilatation caused by the active Crohn's disease/stenosis. There also is mild associated free fluid.  2. There is a moderate to large amount stool seen diffusely throughout the colon.  3. Enlarging right groin low-attenuation mass. Ultrasound should be considered to better evaluate. Biopsy could be considered to better evaluate.     CT abdomen and pelvis with contrast 02/03/2017, revealed  IMPRESSION: Findings of mild colitis with focal fold and wall thickening  involving the mid sigmoid colon. No evidence of obstruction.     CT abdomen and pelvis without contrast 04/15/2016, revealed  IMPRESSION: Question of a new 1-2 mm (tiny) calcification in the left hemipelvis, not definitely seen previously. Theoretically, this could represent a tiny nonobstructing distal left ureteral calculus, but there is no evidence of any significant obstruction, as there is no evidence for any dilatation of the left pelvicalyceal system or ureter. This could represent a tiny phlebolith as well and just not seen previously, secondary to the level of imaging.     CT scan of abdomen and pelvis with contrast 10/13/2014, revealed a  1. Very slight thickening of the wall of the most distal portion of the terminal ileum. The finding is much improved compared to the prior study.   2. The upper abdominal organs are unremarkable.   3. Retroverted uterus.   4. 2 cm corpus luteum cyst right ovary     TB QuantiFERON blood test 01/23/2018, negative.  TB QuantiFERON blood test 1/30/2017, negative.     Stool studies for C. difficile positive on 10/22/2015. Follow-up stool studies were negative.        REVIEW OF SYSTEMS:  Complains of feeling tired and fatigued.  Review of 12 systems done. Review of all the other systems are negative except for once mentioned above.     ENDOSCOPIC HISTORY:  EGD 01/16/2019, revealed  · Gastroesophageal reflux disease.  · Mid and distal  esophageal stricture with normal overlying mucosa. Dilated 18 mm.  · Slightly irregular Z line.  · GE junction biopsy, esophagitis. Antral biopsy, gastritis. Duodenal biopsy, normal.     Celiac panel 07/03/2018, negative.  Chronic hepatitis panel 07/03/2018, negative.     Colonoscopy 03/16/2017, revealed  · Endoscopic findings consistent with ileal-ascending colon anastomosis.  · Normal anastomotic site.  · Severe Crohn's ileitis in the distal 10 cm of terminal ileum.  · Normal mucosa in the entire colon.  · Mild-to-moderate internal hemorrhoids.  · Terminal ileum biopsies revealed Crohn's ileitis. Random colon biopsies normal.     EGD 03/16/2017, revealed  · Gastroesophageal reflux disease.  · Endoscopic findings suggestive of possible eosinophilic esophagitis. Biopsies taken.  · Mid and distal esophageal stricture. Dilated 18 mm with savory.  · Duodenal biopsies revealed increased intraepithelial lymphocytes. Antral biopsies revealed gastritis. GE junction biopsies revealed esophagitis.    EGD may 10, 2012 was normal.    Colonoscopy may 10, 2012 revealed ileal ascending colon anastomosis with moderate ileitis. Mild internal hemorrhoids.    Ileocecal resection with ileal ascending colon anastomosis and abscess removal on December 1, 2011 by Dr. Schultz.      PERSONAL HISTORY:  Single, has a boyfriend.  4 children.  Presently on Medicaid.  Smokes few cigarettes a day.  Denies alcohol.     FAMILY HISTORY:  No history of colon, liver or gallbladder cancer.    PHYSICAL EXAM:  Vitals:    12/28/20 1602   BP: 108/58   Pulse: 83   Resp: 16   Temp: 98.5 °F (36.9 °C)     Body mass index is 24.76 kg/m².  Pleasant female sitting. No signs of acute distress. Appropriate mood and appearance.  CENTRAL NERVOUS SYSTEM: The patient is awake, alert, oriented x3 with intact recent and remote memory. Intact cranial nerves.  EYES: No icterus noted on conjunctival exam. Pupils are equal and reactive to light. Corneal reflex present.  EAR  funcitonal transfers and ADLs  Long term goals  Time Frame for Long term goals : 3-4 weeks  Long term goal 1: PT will complete ADL routine with no cues for safe and adapted tech with S for completion to increase independence in self care tasks  Long term goal 2: PT will complete 2 step homemaking tasks with S and no cues for problem solving or attention to task to be able to prepare a simple meal   If patient is discharged prior to progress note completion, this note is to serve as the discharge note with all goals being unmet unless indicated otherwise. AND NOSE AND THROAT: No lesion of the ears, nose or throat. Oral, nasal and tongue mucosa normal.  NECK: Supple. No masses. No thyroid enlargement. Trachea is centered. JVD negative.  LUNGS: Bilateral air entry is fair. No wheezing or rhonchi heard.  CARDIOVASCULAR SYSTEM: S1, S2 present. No systolic murmur. No peripheral edema.  ABDOMEN:  Abdominal distention noticed .  Decreased bowel sounds.  Unable to appreciate any hepatosplenomegaly due to abdominal distention.  Diffuse, generalized abdominal tenderness.  No guarding noted.   SKIN: Intact and warm. No jaundice. Gait not assessed. Able to move all extremities on a limited exam.    LABS AND IMAGING STUDIES REIVIEWED IN EPIC.  Past medical history, past surgical history, medications, allergies, social history and family history reviewed with patient and in EPIC.    Past Medical History:   Diagnosis Date   • Allergy    • Anxiety    • Chronic abdominal pain 10/11/2016   • Chronic constipation 2/19/2019   • Crohn's disease (CMS/HCC)    • Depression 3/16/2014   • Depressive disorder    • Kidney stone    • Nausea 1/23/2018   • Nicotine dependence 4/18/2014   • Other chronic pain        Past Surgical History:   Procedure Laterality Date   • Colon surgery      Ileocecal resection wtih ileal ascending colon anastomosis and abcess removal   • Colonoscopy  03/16/2017    repeat 2 years-Sor[facility]z   • Colonoscopy w biopsy  05/10/2012   • Esophagogastroduodenoscopy     • Esophagogastroduodenoscopy  03/16/2017    Sokhi   • Esophagogastroduodenoscopy  01/16/2019   • Hysteroscopic sterilization w/ implants  11/22/2010    Essure procedure performed by Dr Jose   • Pap smear  04/23/2012   • Small intestine surgery         Current Facility-Administered Medications   Medication Dose Route Frequency Provider Last Rate Last Admin   • sodium chloride 0.9 % flush bag 25 mL  25 mL Intravenous PRN Twin Jackson MD 25 mL/hr at 12/28/20 1340 25 mL at 12/28/20 1340   • Potassium Standard  Replacement Protocol   Does not apply See Admin Instructions Twin Jackson MD       • nalbuphine (NUBAIN) injection 2.5 mg  2.5 mg Intravenous Q8H PRN Twin Jackson MD       • naLOXone (NARCAN) injection 0.1 mg  0.1 mg Intravenous PRN Twin Jackson MD       • sodium chloride 0.9% infusion   Intravenous Continuous Twin Jackson MD       • HYDROmorphone (DILAUDID) 0.2 mg/mL in sodium chl 0.9% 30 mL PCA infusion   Intravenous Continuous Twin Jackson MD   New Bag at 12/28/20 1115   • sodium chloride 0.9% infusion   Intravenous Continuous Twin Jackson  mL/hr at 12/28/20 1110 New Bag at 12/28/20 1110   • methylPREDNISolone (SOLU-Medrol) PF injection 60 mg  60 mg Intravenous 3 times per day Monty Treviño MD       • [Held by provider] HYDROmorphone (DILAUDID) injection 0.5-1 mg  0.5-1 mg Intravenous Q2H PRN Aaliyah Albrecht MD   1 mg at 12/28/20 0808   • diphenhydrAMINE (BENADRYL) injection 25 mg  25 mg Intravenous Q4H PRN Aaliyah Albrecht MD   25 mg at 12/28/20 0840   • sodium chloride 0.9 % flush bag 25 mL  25 mL Intravenous PRN Aaliyah Albrecht MD       • sodium chloride (PF) 0.9 % injection 2 mL  2 mL Intracatheter 2 times per day Aaliyah Albrecht MD   2 mL at 12/28/20 0816   • pantoprazole (PROTONIX) EC tablet 40 mg  40 mg Oral QAM AC Twin Jackson MD   40 mg at 12/28/20 0602   • sertraline (ZOLOFT) tablet 100 mg  100 mg Oral Daily Twin Jackson MD   100 mg at 12/28/20 0816   • metroNIDAZOLE (FLAGYL) IVPB 500 mg  500 mg Intravenous 3 times per day Twin Jackson  mL/hr at 12/28/20 1347 500 mg at 12/28/20 1347   • piperacillin-tazobactam (ZOSYN) 3.375 g in sodium chloride 0.9 % 100 mL IVPB  3.375 g Intravenous 3 times per day Corky Gilbert DO 25 mL/hr at 12/28/20 1345 3.375 g at 12/28/20 1345   • polyethylene glycol (MIRALAX) packet 17 g  17 g Per NG tube Daily Corky Gilbert DO   17 g at 12/28/20 0816   • diazePAM (VALIUM) tablet 5-10 mg  5-10 mg Oral Q6H PRN Corky Gilbert DO   10 mg  at 12/28/20 1549   • ketorolac injection 30 mg  30 mg Intravenous Q6H PRN Corky Thrasherta, DO   30 mg at 12/28/20 1422       ALLERGIES:   Allergen Reactions   • Azathioprine SWELLING   • Ciprofloxacin HIVES   • Nsaids Nausea & Vomiting       Social History     Tobacco Use   • Smoking status: Current Every Day Smoker     Packs/day: 0.30     Types: Cigarettes   • Smokeless tobacco: Never Used   • Tobacco comment: vaping   Substance Use Topics   • Alcohol use: No     Alcohol/week: 0.0 standard drinks     Frequency: Never     Drinks per session: 1 or 2     Binge frequency: Never   • Drug use: No       Family History   Problem Relation Age of Onset   • Arthritis Mother    • High blood pressure Mother    • Heart disease Mother    • Hyperlipidemia Mother    • Clotting Disorder Mother    • Heart disease Father    • High blood pressure Father    • Cancer Father         throat   • Hyperlipidemia Father        Scheduled meds and infusions:  • Potassium Standard Replacement Protocol   Does not apply See Admin Instructions   • methylPREDNISolone (SOLU-Medrol) IV  60 mg Intravenous 3 times per day   • sodium chloride (PF)  2 mL Intracatheter 2 times per day   • pantoprazole  40 mg Oral QAM AC   • sertraline  100 mg Oral Daily   • metroNIDAZOLE (FLAGYL) IVPB  500 mg Intravenous 3 times per day   • piperacillin-tazobactam (ZOSYN) extended interval IVPB  3.375 g Intravenous 3 times per day   • polyethylene glycol  17 g Per NG tube Daily     • sodium chloride 0.9% infusion     • HYDROmorphone (DILAUDID) 0.2 mg/mL in sodium chl 0.9% 30 mL PCA infusion     • sodium chloride 0.9% infusion 125 mL/hr at 12/28/20 1110         Vital 24 Hour Range Most Recent Value   Temperature Temp  Min: 97.5 °F (36.4 °C)  Max: 98.5 °F (36.9 °C) 98.5 °F (36.9 °C)   Pulse Pulse  Min: 72  Max: 91 83   Respiratory Resp  Min: 16  Max: 24 16   Blood Pressure BP  Min: 82/51  Max: 108/58 108/58   Pulse Oximetry SpO2  Min: 94 %  Max: 96 %    O2 No data recorded       Vital Most Recent Value First Value   Weight 61.4 kg Weight: 61.4 kg   Height 5' 2\" (157.5 cm) Height: 5' 2\" (157.5 cm)       Intake/Output Summary (Last 24 hours) at 12/28/2020 1617  Last data filed at 12/28/2020 1617  Gross per 24 hour   Intake 3609 ml   Output 2125 ml   Net 1484 ml     Current diet:  Npo Diet With Exceptions; Ice Chips, Sips Of Water, Medications    Ancillary Studies including laboratory results are reviewed as recorded in [facility] 12/28/2020 4:17 PM.  A subset of labs and results are listed below:  lab last 12 hrs;   Recent Results (from the past 12 hour(s))   Magnesium    Collection Time: 12/28/20  6:15 AM   Result Value Ref Range    Magnesium 1.9 1.7 - 2.4 mg/dL   Comprehensive Metabolic Panel    Collection Time: 12/28/20  6:15 AM   Result Value Ref Range    Fasting Status      Sodium 139 135 - 145 mmol/L    Potassium 3.1 (L) 3.4 - 5.1 mmol/L    Chloride 103 98 - 107 mmol/L    Carbon Dioxide 28 21 - 32 mmol/L    Anion Gap 11 10 - 20 mmol/L    Glucose 71 65 - 99 mg/dL    BUN 16 6 - 20 mg/dL    Creatinine 0.68 0.51 - 0.95 mg/dL    Glomerular Filtration Rate >90 >90 mL/min/1.73m2    BUN/ Creatinine Ratio 24 7 - 25    Calcium 7.9 (L) 8.4 - 10.2 mg/dL    Bilirubin, Total 0.5 0.2 - 1.0 mg/dL    GOT/AST 15 <=37 Units/L    GPT/ALT 14 <64 Units/L    Alkaline Phosphatase 48 45 - 117 Units/L    Albumin 2.9 (L) 3.6 - 5.1 g/dL    Protein, Total 6.7 6.4 - 8.2 g/dL    Globulin 3.8 2.0 - 4.0 g/dL    A/G Ratio 0.8 (L) 1.0 - 2.4   CBC with Automated Differential (performable only)    Collection Time: 12/28/20  6:15 AM   Result Value Ref Range    WBC 16.1 (H) 4.2 - 11.0 K/mcL    RBC 3.82 (L) 4.00 - 5.20 mil/mcL    HGB 11.8 (L) 12.0 - 15.5 g/dL    HCT 36.3 36.0 - 46.5 %    MCV 95.0 78.0 - 100.0 fl    MCH 30.9 26.0 - 34.0 pg    MCHC 32.5 32.0 - 36.5 g/dL    RDW-CV 13.9 11.0 - 15.0 %     140 - 450 K/mcL    NRBC 0 <=0 /100 WBC    Neutrophil, Percent 69 %    Lymphocytes, Percent 18 %    Mono, Percent 11 %     Eosinophils, Percent 1 %    Basophils, Percent 1 %    Immature Granulocytes 0 %    Absolute Neutrophils 11.1 (H) 1.8 - 7.7 K/mcL    Absolute Lymphocytes 3.0 1.0 - 4.8 K/mcL    Absolute Monocytes 1.7 (H) 0.3 - 0.9 K/mcL    Absolute Eosinophils  0.2 0.0 - 0.5 K/mcL    Absolute Basophils 0.1 0.0 - 0.3 K/mcL    Absolute Immmature Granulocytes 0.1 0.0 - 0.2 K/mcL    RDW-SD 48.4 39.0 - 50.0 fL       Xr Chest Ap Or Pa    Result Date: 12/27/2020  Narrative: XR CHEST AP OR PA INDICATION:  NG tube placement verify COMPARISON:  Portable chest 1/3/2020. FINDINGS: Single view chest The nasogastric tube is present. The tip of the tube is in the body the stomach. The heart size normal. Vascularity is normal. Lungs are clear. There is no active infiltrate, pleural effusion or pneumothorax. The miesha are normal.     Impression: IMPRESSION: No acute cardiopulmonary disease. 2. Nasogastric tube in place, tip in the stomach..     Ct Abdomen Pelvis W Contrast    Result Date: 12/27/2020  Narrative: CT ABDOMEN PELVIS W CONTRAST HISTORY: Abdominal pain, acute, nonlocalized Comparison: January 3, 2020. TECHNIQUE:  Axial images were obtained through the abdomen and pelvis With 100 mL of Isovue-300 IV contrast. Multiplanar reformats were performed. FINDINGS CT ABDOMEN: Lung bases: No pleural effusions, focal consolidation or cardiomegaly. Abdomen: Liver: Grossly unremarkable. Gallbladder:Grossly unremarkable. Spleen:Grossly unremarkable.  Pancreas:Grossly unremarkable. Adrenal glands:Grossly unremarkable. Kidneys:Stable probable small cyst in the right kidney, no hydronephrosis bilaterally. Bowel:Limited evaluation of the stomach is unremarkable.Dilation of the distal ileum with thickening of the terminal ileum. Small bowel feces sign in the distal ileum. Limited evaluation of the colon is otherwise unremarkable. Vasculature:No gross aneurysm of the aorta. IVC is grossly unremarkable. Lymph nodes/miscellaneous:No gross lymphadenopathy by CT  criteria. Pelvis: Urinary bladder and uterus are unremarkable, no gross adnexal lesions, trace free fluid in the pelvis, contraceptive device is in the pelvis. Bones: No acute osseous findings.     Impression: IMPRESSION: Grossly stable terminal ileum inflammation with concomitant ileus versus early small bowel obstruction. Trace free fluid in the pelvis which is likely reactive, no free air. Other findings, as above.     Unresulted Labs (From admission, onward)     Start     Ordered    12/29/20 0600  Comprehensive Metabolic Panel  AM DRAW,   Routine      12/28/20 1225    12/29/20 0600  CBC No Differential  AM DRAW,   Routine      12/28/20 1225    12/28/20 1558  Potassium  TIMED ONCE,   TD      12/28/20 0758              Unresulted Procedure (From admission, onward)    None        Microbiology:  Microbiology Results     None          Monty Treviño MD  Gastroenterologist  [facility]  12/28/2020 4:17 PM

## 2021-01-19 ENCOUNTER — TELEPHONE (OUTPATIENT)
Dept: FAMILY MEDICINE CLINIC | Age: 86
End: 2021-01-19

## 2021-01-19 DIAGNOSIS — C78.7 CARCINOMA OF BREAST METASTATIC TO LIVER, UNSPECIFIED LATERALITY (HCC): Primary | ICD-10-CM

## 2021-01-19 DIAGNOSIS — Z86.73 H/O: CVA (CEREBROVASCULAR ACCIDENT): ICD-10-CM

## 2021-01-19 DIAGNOSIS — I25.10 CORONARY ARTERY DISEASE INVOLVING NATIVE CORONARY ARTERY OF NATIVE HEART WITHOUT ANGINA PECTORIS: ICD-10-CM

## 2021-01-19 DIAGNOSIS — C50.919 CARCINOMA OF BREAST METASTATIC TO LIVER, UNSPECIFIED LATERALITY (HCC): Primary | ICD-10-CM

## 2021-01-19 DIAGNOSIS — R06.02 SOB (SHORTNESS OF BREATH) ON EXERTION: ICD-10-CM

## 2021-01-19 NOTE — TELEPHONE ENCOUNTER
Tri Montgomery from [facility] called req an order for a   Standard Shower chair with back    Needs to have pt's   Weight is 155# and Height is 5 ft  With DX    Please Fax to [phone] can be put in [facility] also per Tri Montgomery    They have tried for several days to fax an order but our fax keeps saying busy and no answer.

## 2021-01-22 ENCOUNTER — TELEPHONE (OUTPATIENT)
Dept: FAMILY MEDICINE CLINIC | Age: 86
End: 2021-01-22

## 2021-01-22 NOTE — TELEPHONE ENCOUNTER
Joaquina Seymour, [facility] ([phone]) - saw her last night and noticed excessive bruising and red spots throughout arms and legs. Also her skin is very discolored. Pt is on  Prednisone 30 mg daily from Dr. Manuel Ngo. She is thinking maybe get a lab ordered? She can draw it anytime today or tomorrow. Pt is living by herself again.

## 2021-01-29 ENCOUNTER — NURSE ONLY (OUTPATIENT)
Dept: LAB | Age: 86
End: 2021-01-29

## 2021-01-29 LAB
ALBUMIN SERPL-MCNC: 4.3 G/DL (ref 3.5–5.1)
ALP BLD-CCNC: 41 U/L (ref 38–126)
ALT SERPL-CCNC: 17 U/L (ref 11–66)
ANION GAP SERPL CALCULATED.3IONS-SCNC: 8 MEQ/L (ref 8–16)
AST SERPL-CCNC: 30 U/L (ref 5–40)
BASOPHILS # BLD: 0.3 %
BASOPHILS ABSOLUTE: 0 THOU/MM3 (ref 0–0.1)
BILIRUB SERPL-MCNC: 0.5 MG/DL (ref 0.3–1.2)
BUN BLDV-MCNC: 19 MG/DL (ref 7–22)
CALCIUM SERPL-MCNC: 9.3 MG/DL (ref 8.5–10.5)
CHLORIDE BLD-SCNC: 105 MEQ/L (ref 98–111)
CO2: 28 MEQ/L (ref 23–33)
CREAT SERPL-MCNC: 0.8 MG/DL (ref 0.4–1.2)
EOSINOPHIL # BLD: 1.4 %
EOSINOPHILS ABSOLUTE: 0.1 THOU/MM3 (ref 0–0.4)
ERYTHROCYTE [DISTWIDTH] IN BLOOD BY AUTOMATED COUNT: 15.1 % (ref 11.5–14.5)
ERYTHROCYTE [DISTWIDTH] IN BLOOD BY AUTOMATED COUNT: 52.7 FL (ref 35–45)
GFR SERPL CREATININE-BSD FRML MDRD: 68 ML/MIN/1.73M2
GLUCOSE BLD-MCNC: 65 MG/DL (ref 70–108)
HCT VFR BLD CALC: 44.4 % (ref 37–47)
HEMOGLOBIN: 13.3 GM/DL (ref 12–16)
IMMATURE GRANS (ABS): 0.01 THOU/MM3 (ref 0–0.07)
IMMATURE GRANULOCYTES: 0.1 %
LYMPHOCYTES # BLD: 37.8 %
LYMPHOCYTES ABSOLUTE: 2.9 THOU/MM3 (ref 1–4.8)
MCH RBC QN AUTO: 28.2 PG (ref 26–33)
MCHC RBC AUTO-ENTMCNC: 30 GM/DL (ref 32.2–35.5)
MCV RBC AUTO: 94.3 FL (ref 81–99)
MONOCYTES # BLD: 9 %
MONOCYTES ABSOLUTE: 0.7 THOU/MM3 (ref 0.4–1.3)
NUCLEATED RED BLOOD CELLS: 0 /100 WBC
PLATELET # BLD: 174 THOU/MM3 (ref 130–400)
PMV BLD AUTO: 12.8 FL (ref 9.4–12.4)
POTASSIUM SERPL-SCNC: 3.6 MEQ/L (ref 3.5–5.2)
RBC # BLD: 4.71 MILL/MM3 (ref 4.2–5.4)
SEDIMENTATION RATE, ERYTHROCYTE: 4 MM/HR (ref 0–20)
SEG NEUTROPHILS: 51.4 %
SEGMENTED NEUTROPHILS ABSOLUTE COUNT: 4 THOU/MM3 (ref 1.8–7.7)
SODIUM BLD-SCNC: 141 MEQ/L (ref 135–145)
TOTAL PROTEIN: 7 G/DL (ref 6.1–8)
WBC # BLD: 7.7 THOU/MM3 (ref 4.8–10.8)

## 2021-02-09 ENCOUNTER — TELEPHONE (OUTPATIENT)
Dept: FAMILY MEDICINE CLINIC | Age: 86
End: 2021-02-09

## 2021-02-09 NOTE — LETTER
[place] [address]  Phone: [phone]  Fax: [phone]    Anjana Urban MD        February 9, 2021     Patient: Martita Jha   YOB: 1933   Date of Visit: 2/9/2021       To Whom it May Concern:    Martita Jha is a patient of mine in this office. Patient may stop using oxygen as she has not been using it for 1 year. If you have any questions or concerns, please don't hesitate to call.     Sincerely,         Anjana Urban MD

## 2021-02-09 NOTE — TELEPHONE ENCOUNTER
Jessy Hernandez, [facility] ([phone]) - write an order that pt could discontinue her oxygen. She has not used it in over a year. [address] needs a Rx that they can discontinue oxygen and then they will go pick it up. Most recent pulse ox was 98. Pulse ox has been running 98 and above on room air.

## 2021-02-12 RX ORDER — LEVOTHYROXINE SODIUM 0.1 MG/1
TABLET ORAL
Qty: 90 TABLET | Refills: 1 | Status: SHIPPED | OUTPATIENT
Start: 2021-02-12 | End: 2021-10-05 | Stop reason: SDUPTHER

## 2021-02-12 NOTE — TELEPHONE ENCOUNTER
Date of last visit:  12/8/2020  Date of next visit:  3/9/2021    Requested Prescriptions     Pending Prescriptions Disp Refills    levothyroxine (SYNTHROID) 100 MCG tablet [Pharmacy Med Name: LEVOTHYROXINE 100 MCG TABLET] 90 tablet 1     Sig: take 1 tablet by mouth once daily

## 2021-03-09 ENCOUNTER — OFFICE VISIT (OUTPATIENT)
Dept: FAMILY MEDICINE CLINIC | Age: 86
End: 2021-03-09

## 2021-03-09 VITALS
BODY MASS INDEX: 25.23 KG/M2 | WEIGHT: 128.5 LBS | DIASTOLIC BLOOD PRESSURE: 52 MMHG | SYSTOLIC BLOOD PRESSURE: 118 MMHG | RESPIRATION RATE: 16 BRPM | TEMPERATURE: 97.2 F | HEART RATE: 56 BPM | HEIGHT: 60 IN

## 2021-03-09 DIAGNOSIS — Z79.52 CURRENT CHRONIC USE OF SYSTEMIC STEROIDS: ICD-10-CM

## 2021-03-09 DIAGNOSIS — E03.4 HYPOTHYROIDISM DUE TO ACQUIRED ATROPHY OF THYROID: ICD-10-CM

## 2021-03-09 DIAGNOSIS — C50.919 CARCINOMA OF BREAST METASTATIC TO LIVER, UNSPECIFIED LATERALITY (HCC): ICD-10-CM

## 2021-03-09 DIAGNOSIS — C78.7 CARCINOMA OF BREAST METASTATIC TO LIVER, UNSPECIFIED LATERALITY (HCC): ICD-10-CM

## 2021-03-09 DIAGNOSIS — Z86.73 H/O: CVA (CEREBROVASCULAR ACCIDENT): ICD-10-CM

## 2021-03-09 DIAGNOSIS — M54.42 BILATERAL LOW BACK PAIN WITH LEFT-SIDED SCIATICA, UNSPECIFIED CHRONICITY: Primary | ICD-10-CM

## 2021-03-09 PROCEDURE — 1090F PRES/ABSN URINE INCON ASSESS: CPT | Performed by: EMERGENCY MEDICINE

## 2021-03-09 PROCEDURE — 4040F PNEUMOC VAC/ADMIN/RCVD: CPT | Performed by: EMERGENCY MEDICINE

## 2021-03-09 PROCEDURE — 1036F TOBACCO NON-USER: CPT | Performed by: EMERGENCY MEDICINE

## 2021-03-09 PROCEDURE — G8417 CALC BMI ABV UP PARAM F/U: HCPCS | Performed by: EMERGENCY MEDICINE

## 2021-03-09 PROCEDURE — G8427 DOCREV CUR MEDS BY ELIG CLIN: HCPCS | Performed by: EMERGENCY MEDICINE

## 2021-03-09 PROCEDURE — 99214 OFFICE O/P EST MOD 30 MIN: CPT | Performed by: EMERGENCY MEDICINE

## 2021-03-09 PROCEDURE — 1123F ACP DISCUSS/DSCN MKR DOCD: CPT | Performed by: EMERGENCY MEDICINE

## 2021-03-09 RX ORDER — HYDROCODONE BITARTRATE AND ACETAMINOPHEN 5; 325 MG/1; MG/1
TABLET ORAL
Status: CANCELLED | OUTPATIENT
Start: 2021-03-09

## 2021-03-09 ASSESSMENT — ENCOUNTER SYMPTOMS
SORE THROAT: 0
VOICE CHANGE: 0
CHEST TIGHTNESS: 0
COUGH: 0
WHEEZING: 0
BACK PAIN: 1
ABDOMINAL PAIN: 0
RHINORRHEA: 0
NAUSEA: 0
VOMITING: 0
SINUS PRESSURE: 0
SHORTNESS OF BREATH: 0
TROUBLE SWALLOWING: 0
CONSTIPATION: 0
DIARRHEA: 0

## 2021-03-09 ASSESSMENT — PATIENT HEALTH QUESTIONNAIRE - PHQ9
SUM OF ALL RESPONSES TO PHQ QUESTIONS 1-9: 0
SUM OF ALL RESPONSES TO PHQ QUESTIONS 1-9: 0

## 2021-03-09 NOTE — PROGRESS NOTES
Visit Date: 3/9/2021    Subjective:    Sarah Pickett is a 80 y. o.female who presents today for:  Chief Complaint   Patient presents with    Check-Up         HPI:     HPI     Having  Low back pain for 2 weeks after cleaning her home .  She went bak to her home    She lost 6 lbs and Dr Alexandra Barrera who requested a bone scan , also gotten a Steroid from Dr Morgan Lenz Medications:  Current Outpatient Medications   Medication Sig Dispense Refill    levothyroxine (SYNTHROID) 100 MCG tablet take 1 tablet by mouth once daily 90 tablet 1    metoprolol tartrate (LOPRESSOR) 25 MG tablet take 1/2 tablet by mouth twice a day 90 tablet 1    RA ASPIRIN EC ADULT LOW ST 81 MG EC tablet take 1 tablet by mouth once daily      Bioflavonoid Products (RA VITAMIN C CR) TBCR as directed      loratadine (RA LORATADINE) 10 MG tablet take 1 tablet by mouth once daily 90 tablet 1    diclofenac sodium (VOLTAREN) 1 % GEL Apply 4 g topically 3 times daily as needed for Pain 200 g 2    pantoprazole (PROTONIX) 40 MG tablet take 1 tablet by mouth once daily 90 tablet 1    sertraline (ZOLOFT) 50 MG tablet take 1 tablet by mouth twice a day 180 tablet 1    isosorbide mononitrate (IMDUR) 30 MG extended release tablet take 1 tablet by mouth once daily 90 tablet 1    clopidogrel (PLAVIX) 75 MG tablet take 1 tablet by mouth once daily 90 tablet 1    vitamin D (ERGOCALCIFEROL) 1.25 MG (49960 UT) CAPS capsule take 1 capsule by mouth every week      promethazine (PHENERGAN) 25 MG tablet Take 1 tablet by mouth every 6 hours as needed for Nausea (and vomiting) 12 tablet 0    Calcium Carbonate-Vitamin D (OYSTER SHELL CALCIUM/D) 500-200 MG-UNIT TABS take 1 tablet by mouth twice a day 180 tablet 3    predniSONE (DELTASONE) 1 MG tablet Take 3 mg by mouth daily       acetaminophen (TYLENOL) 500 MG tablet Take 1,000 mg by mouth every 8 hours as needed for Pain       Multiple Vitamins-Minerals (CENTRUM SILVER ADULT 50+) TABS Take 1 tablet by mouth daily 90 tablet 1     No current facility-administered medications for this visit. Subjective:      Review of Systems   Constitutional: Positive for unexpected weight change. Negative for appetite change, chills, diaphoresis, fatigue and fever. HENT: Negative for congestion, ear pain, postnasal drip, rhinorrhea, sinus pressure, sneezing, sore throat, trouble swallowing and voice change. Respiratory: Negative for cough, chest tightness, shortness of breath and wheezing. Cardiovascular: Negative for chest pain, palpitations and leg swelling. Gastrointestinal: Negative for abdominal pain, constipation, diarrhea, nausea and vomiting. Musculoskeletal: Positive for back pain. Negative for arthralgias, joint swelling, myalgias, neck pain and neck stiffness. Neurological: Negative for dizziness, syncope, weakness, light-headedness, numbness and headaches. Objective:     BP (!) 118/52 (Site: Left Upper Arm, Position: Sitting, Cuff Size: Medium Adult)   Pulse 56   Temp 97.2 °F (36.2 °C) (Skin)   Resp 16   Ht 5' (1.524 m)   Wt 128 lb 8 oz (58.3 kg)   LMP  (LMP Unknown)   BMI 25.10 kg/m²   BP Readings from Last 3 Encounters:   03/09/21 (!) 118/52   12/08/20 136/82   10/25/20 (!) 117/56     Wt Readings from Last 3 Encounters:   03/09/21 128 lb 8 oz (58.3 kg)   12/08/20 135 lb 4 oz (61.3 kg)   10/25/20 138 lb (62.6 kg)       Physical Exam  Vitals signs reviewed. Constitutional:       Appearance: She is well-developed. HENT:      Head: Normocephalic and atraumatic. Right Ear: External ear normal.      Left Ear: External ear normal.      Nose: Nose normal.   Eyes:      General: No scleral icterus. Conjunctiva/sclera: Conjunctivae normal.      Pupils: Pupils are equal, round, and reactive to light. Neck:      Musculoskeletal: Normal range of motion and neck supple. Thyroid: No thyromegaly. Vascular: No JVD.    Cardiovascular:      Rate and Rhythm: Normal rate and

## 2021-03-09 NOTE — LETTER
[facility]  [facility] [address] [address]  Phone: [phone]  Fax: [phone]    Mary Perdomo MD        March 9, 2021     Patient: Lynnette Johnson   YOB: 1933   Date of Visit: 3/9/2021       To Whom it May Concern:      Lynnette Johnson was seen in my clinic on 3/9/2021. Please pick her garbage close to her home due to medical issues including partial blindness . If you have any questions or concerns, please don't hesitate to call.     Sincerely,         Mary Perdomo MD

## 2021-03-10 RX ORDER — B-COMPLEX WITH VITAMIN C
TABLET ORAL
Qty: 180 TABLET | Refills: 3 | Status: SHIPPED | OUTPATIENT
Start: 2021-03-10 | End: 2022-06-28 | Stop reason: SDUPTHER

## 2021-03-10 NOTE — TELEPHONE ENCOUNTER
Date of last visit:  3/9/2021  Date of next visit:  6/10/2021    Requested Prescriptions     Pending Prescriptions Disp Refills    Calcium Carbonate-Vitamin D (OYSTER SHELL CALCIUM/D) 500-200 MG-UNIT TABS [Pharmacy Med Name: OYSTER SHELL 500-VIT D3 200 TB] 180 tablet 3     Sig: take 1 tablet by mouth twice a day

## 2021-03-16 ENCOUNTER — HOSPITAL ENCOUNTER (OUTPATIENT)
Dept: GENERAL RADIOLOGY | Age: 86
Discharge: HOME | End: 2021-03-16
Payer: MEDICARE

## 2021-03-16 ENCOUNTER — HOSPITAL ENCOUNTER (OUTPATIENT)
Dept: NUCLEAR MEDICINE | Age: 86
Discharge: HOME | End: 2021-03-16
Payer: MEDICARE

## 2021-03-16 DIAGNOSIS — M54.50 LOW BACK PAIN, UNSPECIFIED BACK PAIN LATERALITY, UNSPECIFIED CHRONICITY, UNSPECIFIED WHETHER SCIATICA PRESENT: ICD-10-CM

## 2021-03-16 DIAGNOSIS — M54.42 BILATERAL LOW BACK PAIN WITH LEFT-SIDED SCIATICA, UNSPECIFIED CHRONICITY: ICD-10-CM

## 2021-03-16 DIAGNOSIS — C50.912 MALIGNANT NEOPLASM OF LEFT FEMALE BREAST, UNSPECIFIED ESTROGEN RECEPTOR STATUS, UNSPECIFIED SITE OF BREAST (HCC): ICD-10-CM

## 2021-03-16 PROCEDURE — A9503 TC99M MEDRONATE: HCPCS | Performed by: INTERNAL MEDICINE

## 2021-03-16 PROCEDURE — 72100 X-RAY EXAM L-S SPINE 2/3 VWS: CPT

## 2021-03-16 PROCEDURE — 78306 BONE IMAGING WHOLE BODY: CPT

## 2021-03-16 PROCEDURE — 3430000000 HC RX DIAGNOSTIC RADIOPHARMACEUTICAL: Performed by: INTERNAL MEDICINE

## 2021-03-16 RX ORDER — TC 99M MEDRONATE 20 MG/10ML
25 INJECTION, POWDER, LYOPHILIZED, FOR SOLUTION INTRAVENOUS
Status: COMPLETED | OUTPATIENT
Start: 2021-03-16 | End: 2021-03-16

## 2021-03-16 RX ADMIN — TC 99M MEDRONATE 28.3 MILLICURIE: 20 INJECTION, POWDER, LYOPHILIZED, FOR SOLUTION INTRAVENOUS at 11:50

## 2021-03-17 ENCOUNTER — TELEPHONE (OUTPATIENT)
Dept: FAMILY MEDICINE CLINIC | Age: 86
End: 2021-03-17

## 2021-03-17 NOTE — TELEPHONE ENCOUNTER
----- Message from Ale Aly MD sent at 3/16/2021  4:56 PM EDT -----  Please call the patient, patient's x-ray showed degenerative arthritis which is worse as the previous dated 5/21/2019 follow-up as needed

## 2021-03-29 RX ORDER — ISOSORBIDE MONONITRATE 30 MG/1
TABLET, EXTENDED RELEASE ORAL
Qty: 90 TABLET | Refills: 1 | Status: ON HOLD | OUTPATIENT
Start: 2021-03-29 | End: 2022-01-25 | Stop reason: SDUPTHER

## 2021-03-29 RX ORDER — CLOPIDOGREL BISULFATE 75 MG/1
TABLET ORAL
Qty: 90 TABLET | Refills: 1 | Status: ON HOLD | OUTPATIENT
Start: 2021-03-29 | End: 2021-07-20 | Stop reason: HOSPADM

## 2021-03-29 NOTE — TELEPHONE ENCOUNTER
Date of last visit:  3/9/2021  Date of next visit:  6/10/2021    Requested Prescriptions     Pending Prescriptions Disp Refills    clopidogrel (PLAVIX) 75 MG tablet [Pharmacy Med Name: CLOPIDOGREL 75 MG TABLET] 90 tablet 1     Sig: take 1 tablet by mouth once daily    isosorbide mononitrate (IMDUR) 30 MG extended release tablet [Pharmacy Med Name: ISOSORBIDE MONONIT ER 30 MG TB] 90 tablet 1     Sig: take 1 tablet by mouth once daily

## 2021-03-30 DIAGNOSIS — F41.9 ANXIETY: ICD-10-CM

## 2021-03-30 DIAGNOSIS — G47.9 SLEEP DIFFICULTIES: ICD-10-CM

## 2021-03-30 RX ORDER — ALPRAZOLAM 0.5 MG/1
TABLET ORAL
Qty: 45 TABLET | Refills: 0 | Status: SHIPPED | OUTPATIENT
Start: 2021-03-30 | End: 2021-05-25 | Stop reason: ALTCHOICE

## 2021-03-30 NOTE — TELEPHONE ENCOUNTER
Date of last visit:  3/9/2021  Date of next visit:  6/10/2021    Requested Prescriptions     Pending Prescriptions Disp Refills    ALPRAZolam (XANAX) 0.5 MG tablet 45 tablet 0     Sig: take 1 tablet by mouth at bedtime if needed for sleep

## 2021-03-30 NOTE — TELEPHONE ENCOUNTER
Shruti Serrato, niece of patient, called requesting Rx for Alprazolam 0.5 mg one tablet at bedtime.     [address] [facility]

## 2021-04-20 RX ORDER — PANTOPRAZOLE SODIUM 40 MG/1
TABLET, DELAYED RELEASE ORAL
Qty: 90 TABLET | Refills: 1 | Status: SHIPPED | OUTPATIENT
Start: 2021-04-20 | End: 2022-02-10

## 2021-05-04 ENCOUNTER — HOSPITAL ENCOUNTER (EMERGENCY)
Age: 86
Discharge: HOME | End: 2021-05-04
Payer: MEDICARE

## 2021-05-04 ENCOUNTER — APPOINTMENT (OUTPATIENT)
Dept: GENERAL RADIOLOGY | Age: 86
End: 2021-05-04
Payer: MEDICARE

## 2021-05-04 VITALS
DIASTOLIC BLOOD PRESSURE: 53 MMHG | BODY MASS INDEX: 25 KG/M2 | SYSTOLIC BLOOD PRESSURE: 154 MMHG | OXYGEN SATURATION: 95 % | TEMPERATURE: 98.4 F | WEIGHT: 128 LBS | HEART RATE: 61 BPM | RESPIRATION RATE: 14 BRPM

## 2021-05-04 DIAGNOSIS — R53.83 FATIGUE, UNSPECIFIED TYPE: Primary | ICD-10-CM

## 2021-05-04 DIAGNOSIS — Z87.39 HISTORY OF ARTHRITIS: ICD-10-CM

## 2021-05-04 DIAGNOSIS — R53.1 GENERALIZED WEAKNESS: ICD-10-CM

## 2021-05-04 LAB
ALBUMIN SERPL-MCNC: 3.7 G/DL (ref 3.5–5.1)
ALP BLD-CCNC: 38 U/L (ref 38–126)
ALT SERPL-CCNC: 22 U/L (ref 11–66)
ANION GAP SERPL CALCULATED.3IONS-SCNC: 7 MEQ/L (ref 8–16)
AST SERPL-CCNC: 27 U/L (ref 5–40)
BASOPHILS # BLD: 0.1 %
BASOPHILS ABSOLUTE: 0 THOU/MM3 (ref 0–0.1)
BILIRUB SERPL-MCNC: 0.3 MG/DL (ref 0.3–1.2)
BILIRUBIN DIRECT: < 0.2 MG/DL (ref 0–0.3)
BILIRUBIN URINE: NEGATIVE
BLOOD, URINE: NEGATIVE
BUN BLDV-MCNC: 19 MG/DL (ref 7–22)
CALCIUM SERPL-MCNC: 9.2 MG/DL (ref 8.5–10.5)
CHARACTER, URINE: CLEAR
CHLORIDE BLD-SCNC: 108 MEQ/L (ref 98–111)
CO2: 29 MEQ/L (ref 23–33)
COLOR: YELLOW
CREAT SERPL-MCNC: 0.8 MG/DL (ref 0.4–1.2)
EOSINOPHIL # BLD: 0.9 %
EOSINOPHILS ABSOLUTE: 0.1 THOU/MM3 (ref 0–0.4)
ERYTHROCYTE [DISTWIDTH] IN BLOOD BY AUTOMATED COUNT: 15.2 % (ref 11.5–14.5)
ERYTHROCYTE [DISTWIDTH] IN BLOOD BY AUTOMATED COUNT: 55 FL (ref 35–45)
GFR SERPL CREATININE-BSD FRML MDRD: 68 ML/MIN/1.73M2
GLUCOSE BLD-MCNC: 124 MG/DL (ref 70–108)
GLUCOSE URINE: NEGATIVE MG/DL
HCT VFR BLD CALC: 43.5 % (ref 37–47)
HEMOGLOBIN: 13 GM/DL (ref 12–16)
IMMATURE GRANS (ABS): 0.03 THOU/MM3 (ref 0–0.07)
IMMATURE GRANULOCYTES: 0.3 %
KETONES, URINE: NEGATIVE
LEUKOCYTE ESTERASE, URINE: NEGATIVE
LYMPHOCYTES # BLD: 16.6 %
LYMPHOCYTES ABSOLUTE: 1.4 THOU/MM3 (ref 1–4.8)
MCH RBC QN AUTO: 29.5 PG (ref 26–33)
MCHC RBC AUTO-ENTMCNC: 29.9 GM/DL (ref 32.2–35.5)
MCV RBC AUTO: 98.6 FL (ref 81–99)
MONOCYTES # BLD: 7.4 %
MONOCYTES ABSOLUTE: 0.6 THOU/MM3 (ref 0.4–1.3)
NITRITE, URINE: NEGATIVE
NUCLEATED RED BLOOD CELLS: 0 /100 WBC
OSMOLALITY CALCULATION: 290.5 MOSMOL/KG (ref 275–300)
PH UA: 6 (ref 5–9)
PLATELET # BLD: 144 THOU/MM3 (ref 130–400)
PMV BLD AUTO: 12.1 FL (ref 9.4–12.4)
POTASSIUM SERPL-SCNC: 3.6 MEQ/L (ref 3.5–5.2)
PROCALCITONIN: 0.04 NG/ML (ref 0.01–0.09)
PROTEIN UA: NEGATIVE
RBC # BLD: 4.41 MILL/MM3 (ref 4.2–5.4)
SEG NEUTROPHILS: 74.7 %
SEGMENTED NEUTROPHILS ABSOLUTE COUNT: 6.5 THOU/MM3 (ref 1.8–7.7)
SODIUM BLD-SCNC: 144 MEQ/L (ref 135–145)
SPECIFIC GRAVITY, URINE: 1.02 (ref 1–1.03)
TOTAL PROTEIN: 5.9 G/DL (ref 6.1–8)
TROPONIN T: < 0.01 NG/ML
TSH SERPL DL<=0.05 MIU/L-ACNC: 3.93 UIU/ML (ref 0.4–4.2)
UROBILINOGEN, URINE: 0.2 EU/DL (ref 0–1)
WBC # BLD: 8.7 THOU/MM3 (ref 4.8–10.8)

## 2021-05-04 PROCEDURE — 81003 URINALYSIS AUTO W/O SCOPE: CPT

## 2021-05-04 PROCEDURE — 84443 ASSAY THYROID STIM HORMONE: CPT

## 2021-05-04 PROCEDURE — 84145 PROCALCITONIN (PCT): CPT

## 2021-05-04 PROCEDURE — 71045 X-RAY EXAM CHEST 1 VIEW: CPT

## 2021-05-04 PROCEDURE — 6360000002 HC RX W HCPCS: Performed by: PHYSICIAN ASSISTANT

## 2021-05-04 PROCEDURE — 80053 COMPREHEN METABOLIC PANEL: CPT

## 2021-05-04 PROCEDURE — 99284 EMERGENCY DEPT VISIT MOD MDM: CPT

## 2021-05-04 PROCEDURE — 36415 COLL VENOUS BLD VENIPUNCTURE: CPT

## 2021-05-04 PROCEDURE — 84484 ASSAY OF TROPONIN QUANT: CPT

## 2021-05-04 PROCEDURE — 85025 COMPLETE CBC W/AUTO DIFF WBC: CPT

## 2021-05-04 PROCEDURE — 93005 ELECTROCARDIOGRAM TRACING: CPT | Performed by: PHYSICIAN ASSISTANT

## 2021-05-04 PROCEDURE — 82248 BILIRUBIN DIRECT: CPT

## 2021-05-04 PROCEDURE — 96372 THER/PROPH/DIAG INJ SC/IM: CPT

## 2021-05-04 RX ORDER — METHYLPREDNISOLONE ACETATE 80 MG/ML
60 INJECTION, SUSPENSION INTRA-ARTICULAR; INTRALESIONAL; INTRAMUSCULAR; SOFT TISSUE ONCE
Status: COMPLETED | OUTPATIENT
Start: 2021-05-04 | End: 2021-05-04

## 2021-05-04 RX ADMIN — METHYLPREDNISOLONE ACETATE 60 MG: 80 INJECTION, SUSPENSION INTRA-ARTICULAR; INTRALESIONAL; INTRAMUSCULAR; SOFT TISSUE at 14:32

## 2021-05-04 ASSESSMENT — ENCOUNTER SYMPTOMS
RHINORRHEA: 0
VOMITING: 0
ABDOMINAL PAIN: 0
BLOOD IN STOOL: 0
SHORTNESS OF BREATH: 0
NAUSEA: 1
CHEST TIGHTNESS: 0
COUGH: 0

## 2021-05-04 NOTE — ED NOTES
Bed: 007A  Expected date:   Expected time:   Means of arrival: [facility]  Comments:     Nikkie Cooper  05/04/21 1014

## 2021-05-04 NOTE — ED PROVIDER NOTES
[facility] EMERGENCY DEPT      CHIEF COMPLAINT       Chief Complaint   Patient presents with    Fatigue       Nurses Notes reviewed and I agree except as noted in the HPI. HISTORY OF PRESENT ILLNESS    Luly Garza is a 80 y.o. female who presents for evaluation of fatigue that has been present and constant for about a week. Patient states she has also had some nausea and headaches more than her normal for the past week. Patient describes headaches as frontal pressure located behind her eyes. Patient hasn't taken any medication to attempt to improve her symptoms at home. Patient denies any vomiting, appetite change, diarrhea, increased urinary frequency or burning. Patient has a significant history of GERD, hypothyroidism and metastatic breast cancer. REVIEW OF SYSTEMS     Review of Systems   Constitutional: Positive for fatigue. Negative for appetite change, chills and fever. HENT: Negative for congestion and rhinorrhea. Eyes: Negative for visual disturbance. Respiratory: Negative for cough, chest tightness and shortness of breath. Cardiovascular: Negative for chest pain. Gastrointestinal: Positive for nausea. Negative for abdominal pain, blood in stool and vomiting. Genitourinary: Negative for dysuria, frequency and urgency. Musculoskeletal: Negative for gait problem and neck pain. Skin: Negative for rash and wound. Neurological: Positive for weakness and headaches. Negative for dizziness and numbness. Psychiatric/Behavioral: Negative for confusion and sleep disturbance.      PAST MEDICAL HISTORY    has a past medical history of Anxiety, Blind right eye, Breast cancer metastasized to liver [facility]), Carotid stenosis, Colostomy in place [facility]), Degenerative arthritis of cervical spine, GERD (gastroesophageal reflux disease), Hypercholesteremia, Hypoestrogenism, Hypothyroidism, Lumbago, Lumbosacral spinal stenosis, MDRO (multiple drug resistant organisms) resistance, Personal history of RA ASPIRIN EC ADULT LOW ST 81 MG EC tablet take 1 tablet by mouth once daily, DAWHistorical Med      Bioflavonoid Products (RA VITAMIN C CR) TBCR as directedHistorical Med      loratadine (RA LORATADINE) 10 MG tablet take 1 tablet by mouth once daily, Disp-90 tablet,R-1Normal      diclofenac sodium (VOLTAREN) 1 % GEL Apply 4 g topically 3 times daily as needed for Pain, Topical, 3 TIMES DAILY PRN Starting Thu 2020, Disp-200 g,R-2, Normal      vitamin D (ERGOCALCIFEROL) 1.25 MG (85776 UT) CAPS capsule take 1 capsule by mouth every weekHistorical Med      promethazine (PHENERGAN) 25 MG tablet Take 1 tablet by mouth every 6 hours as needed for Nausea (and vomiting), Disp-12 tablet,R-0Normal      predniSONE (DELTASONE) 1 MG tablet Take 3 mg by mouth daily Historical Med      acetaminophen (TYLENOL) 500 MG tablet Take 1,000 mg by mouth every 8 hours as needed for Pain Historical Med      Multiple Vitamins-Minerals (CENTRUM SILVER ADULT 50+) TABS Take 1 tablet by mouth daily, Disp-90 tablet, R-1           ALLERGIES     is allergic to bactrim [sulfamethoxazole-trimethoprim]; demerol; and pcn [penicillins]. FAMILY HISTORY     She indicated that her mother is . She indicated that her father is . She indicated that the status of her sister is unknown. She indicated that the status of her son is unknown.   family history includes Cancer (age of onset: 48) in her son; Cancer (age of onset: 61) in her sister; Cancer (age of onset: 79) in her brother; Cancer (age of onset: 68) in her brother. SOCIAL HISTORY    reports that she has quit smoking. Her smoking use included cigarettes. She smoked 0.50 packs per day. She has never used smokeless tobacco. She reports that she does not drink alcohol or use drugs. PHYSICAL EXAM     INITIAL VITALS:  weight is 128 lb (58.1 kg). Her oral temperature is 98.4 °F (36.9 °C). Her blood pressure is 154/53 (abnormal) and her pulse is 61.  Her respiration is 14 and oxygen saturation is 95%. Physical Exam  Constitutional:       Appearance: Normal appearance. She is well-developed. She is not ill-appearing or diaphoretic. HENT:      Head: Normocephalic and atraumatic. Right Ear: Hearing normal.      Left Ear: Hearing normal.      Nose: Nose normal. No rhinorrhea. Mouth/Throat:      Lips: Pink. Mouth: Mucous membranes are moist.      Pharynx: Oropharynx is clear. Eyes:      General: Lids are normal. No scleral icterus. Extraocular Movements: Extraocular movements intact. Conjunctiva/sclera: Conjunctivae normal.      Pupils: Pupils are equal, round, and reactive to light. Neck:      Musculoskeletal: Normal range of motion and neck supple. No neck rigidity. Trachea: Trachea normal.   Cardiovascular:      Rate and Rhythm: Normal rate and regular rhythm. Pulses: Normal pulses. Heart sounds: Normal heart sounds. No murmur. Pulmonary:      Effort: Pulmonary effort is normal.      Breath sounds: Normal breath sounds and air entry. No decreased breath sounds, wheezing or rhonchi. Abdominal:      General: Abdomen is flat. There is no distension. Palpations: Abdomen is soft. Tenderness: There is no abdominal tenderness. Musculoskeletal:      Comments: Well perfused; movement normal as observed; no signs of DVT   Lymphadenopathy:      Cervical: No cervical adenopathy. Skin:     General: Skin is warm and dry. Findings: No rash. Neurological:      General: No focal deficit present. Mental Status: She is lethargic. Sensory: Sensation is intact. Motor: Motor function is intact. Gait: Gait is intact. Psychiatric:         Mood and Affect: Mood normal.         Speech: Speech normal.         Behavior: Behavior is cooperative.        DIFFERENTIAL DIAGNOSIS:   Including but not limited to: generalized fatigue, RADHA, dehydration, hypothyroidism, anemia, ACS    DIAGNOSTIC RESULTS     EKG: All EKG's are interpreted [facility] who either signs or Co-signs this chart in the absence of a cardiologist.  Ventricular rate: 61  CO Interval: 156  QRS Duration: 82  QTc: 426  P-R-T axes: 101, 6, 145  Normal sinus rhythm, left ventricular hypertrophy with repolarization abnormality, cannot rule out septal infarct - age undetermined, abnormal ECG. No STEMI. RADIOLOGY: non-plain film images(s) such as CT,Ultrasound and MRI are read by the radiologist.  Plain radiographic images are visualized and preliminarily interpreted by the emergency physician unless otherwise stated below. XR CHEST PORTABLE   Final Result   1. Borderline cardiomegaly. 2. Increased density in the right paratracheal space, unchanged. 3. Atherosclerotic calcification aortic arch. Stent at the origin of the left subclavian artery unchanged. 4. Otherwise negative chest x-ray. .               **This report has been created using voice recognition software. It may contain minor errors which are inherent in voice recognition technology. **      Final report electronically signed by DR Sincere Soriano on 5/4/2021 12:43 PM          LABS:   Labs Reviewed   CBC WITH AUTO DIFFERENTIAL - Abnormal; Notable for the following components:       Result Value    MCHC 29.9 (*)     RDW-CV 15.2 (*)     RDW-SD 55.0 (*)     All other components within normal limits   BASIC METABOLIC PANEL - Abnormal; Notable for the following components:    Glucose 124 (*)     All other components within normal limits   HEPATIC FUNCTION PANEL - Abnormal; Notable for the following components:     Total Protein 5.9 (*)     All other components within normal limits   ANION GAP - Abnormal; Notable for the following components:    Anion Gap 7.0 (*)     All other components within normal limits   GLOMERULAR FILTRATION RATE, ESTIMATED - Abnormal; Notable for the following components:    Est, Glom Filt Rate 68 (*)     All other components within normal limits   TSH WITH REFLEX   URINE RT REFLEX TO CULTURE   TROPONIN   PROCALCITONIN   OSMOLALITY       EMERGENCY DEPARTMENT COURSE:   Vitals:    Vitals:    05/04/21 1025 05/04/21 1144 05/04/21 1249 05/04/21 1250   BP: (!) 136/44 (!) 140/51  (!) 154/53   Pulse:  74 61    Resp:  16 14    Temp: 98.4 °F (36.9 °C)      TempSrc: Oral      SpO2:  97% 95%    Weight:         MDM:  Ms. Carlos Cr presents today for evaluation of fatigue that has been present and constant for the past week. Patient also notes some headaches and nausea that have been more frequent in the past week compared to her normal. Patient denies any chest pain or SOB, vomiting, stool changes, or increase in urinary frequency or dysuria. Vital signs reviewed and noted stable. Old records were reviewed. Appropriate laboratory and imaging studies were ordered and reviewed upon completion. Work-up was overwhelmingly benign. The patient remained stable in the ER with good vital signs, neurovascularly intact, and no distress evident. Patient demonstrated a steady gait. 1305: Dr. Debbie Rowland was consulted and advised administering a shot of Depo-Medrol for arthritis pain, providing reassurance, discharge home, and he would arrange physical therapy to come to her home. The patient was comfortable with plan of care. I have given the patient strict written and verbal instructions about care at home, follow-up, and signs and symptoms of worsening of condition and they did verbalize understanding. CRITICAL CARE:   None    CONSULTS:  Dr. Debbie Rowland (internal medicine)    PROCEDURES:  None    FINAL IMPRESSION      1. Fatigue, unspecified type    2. Generalized weakness    3. History of arthritis          DISPOSITION/PLAN     1. Fatigue, unspecified type    2. Generalized weakness    3.  History of arthritis    Discharge    PATIENT REFERRED TO:  Venecia Drew MD  [address] [place] [address] [address]  [phone]    Schedule an appointment as soon as possible for a visit DISCHARGE MEDICATIONS:  Discharge Medication List as of 5/4/2021  2:18 PM          (Please note that portions of this note were completed with a voice recognition program.  Efforts were made to edit the dictations but occasionally words are mis-transcribed.)    Richy Randhawa PA-C 05/04/21 6:46 PM    ILDA Soto PA-C  05/04/21 0481

## 2021-05-04 NOTE — ACP (ADVANCE CARE PLANNING)
Advance Care Planning     Advance Care Planning Activator (Inpatient)  Conversation Note      Date of ACP Conversation: 5/4/2021    Conversation Conducted with: Patient with Decision Making Capacity    ACP Activator: [address] Decision Maker:     Current Designated Health Care Decision Maker: Today we documented Decision Maker(s) consistent with ACP documents on file. Care Preferences    Ventilation: \"If you were in your present state of health and suddenly became very ill and were unable to breathe on your own, what would your preference be about the use of a ventilator (breathing machine) if it were available to you? \"      Would the patient desire the use of ventilator (breathing machine)?: yes    \"If your health worsens and it becomes clear that your chance of recovery is unlikely, what would your preference be about the use of a ventilator (breathing machine) if it were available to you? \"     Would the patient desire the use of ventilator (breathing machine)?: No      Resuscitation  \"CPR works best to restart the heart when there is a sudden event, like a heart attack, in someone who is otherwise healthy. Unfortunately, CPR does not typically restart the heart for people who have serious health conditions or who are very sick. \"    \"In the event your heart stopped as a result of an underlying serious health condition, would you want attempts to be made to restart your heart (answer \"yes\" for attempt to resuscitate) or would you prefer a natural death (answer \"no\" for do not attempt to resuscitate)? \" no       [x] Yes   [] No   Educated Patient / Othelia Else regarding differences between Advance Directives and portable DNR orders.     Length of ACP Conversation in minutes:  39  Conversation Outcomes:  [x] ACP discussion completed  [] Existing advance directive reviewed with patient; no changes to patient's previously recorded wishes  [x] New Advance Directive completed  [] Portable Do Not Rescitate prepared for Provider review and signature  [] POLST/POST/MOLST/MOST prepared for Provider review and signature      Follow-up plan:    [] Schedule follow-up conversation to continue planning  [] Referred individual to Provider for additional questions/concerns   [] Advised patient/agent/surrogate to review completed ACP document and update if needed with changes in condition, patient preferences or care setting    [x] This note routed to one or more involved healthcare providers     - Latanya and her niece were in the room at the time of this visit. During a review of her code status she acknowledged having a DNR-CCA on file. When verified by this staff, it was noticed that the information was incorrectly recorded. A new DNR-CCA was created and filed. - Patient verified the POA's listed so one of them was currently inactive and reactivated as well. Kaci Dunham confirmed she wants Dorma Job as her POA and Saritha Rm as her alternate. Information was provided to registration for assistance on reactiviating these agents in Latanya's file. - Latanya confirmed she does not want CPR not long term intubation. She is okay with intubation if needed for a surgery, but does not want to leave the hospital on a ventilator. She would rather have the devices removed and be allowed to die naturally.

## 2021-05-04 NOTE — ED NOTES
This nurse with another ED nurse unable to straight cath patient to obtain urine sample. Will attempt with another ED nurse and update ED provider.       Donato Mar RN  05/04/21 [id number]

## 2021-05-05 ENCOUNTER — CARE COORDINATION (OUTPATIENT)
Dept: FAMILY MEDICINE CLINIC | Age: 86
End: 2021-05-05

## 2021-05-05 LAB
EKG ATRIAL RATE: 61 BPM
EKG P AXIS: 101 DEGREES
EKG P-R INTERVAL: 156 MS
EKG Q-T INTERVAL: 424 MS
EKG QRS DURATION: 82 MS
EKG QTC CALCULATION (BAZETT): 426 MS
EKG R AXIS: 6 DEGREES
EKG T AXIS: 145 DEGREES
EKG VENTRICULAR RATE: 61 BPM

## 2021-05-05 PROCEDURE — 93010 ELECTROCARDIOGRAM REPORT: CPT | Performed by: INTERNAL MEDICINE

## 2021-05-07 ENCOUNTER — TELEPHONE (OUTPATIENT)
Dept: FAMILY MEDICINE CLINIC | Age: 86
End: 2021-05-07

## 2021-05-07 NOTE — TELEPHONE ENCOUNTER
[place] nurse called stating that ever since the pt came home from hospital that she is still very fatigued, body weak. Pt feels like she is going to fall all the time and just doesn't want to get out of bed. Isma Mcnally was wondering if there were more labs that could be done on the pt. She stated that the pt just isn't her self and not sure what to do at this point.

## 2021-05-12 ENCOUNTER — TELEPHONE (OUTPATIENT)
Dept: FAMILY MEDICINE CLINIC | Age: 86
End: 2021-05-12

## 2021-05-12 DIAGNOSIS — Z85.89 HISTORY OF MALIGNANT NEOPLASM METASTATIC TO LIVER: ICD-10-CM

## 2021-05-12 DIAGNOSIS — R63.0 ANOREXIA: Primary | ICD-10-CM

## 2021-05-12 NOTE — TELEPHONE ENCOUNTER
Anette Forbes with [facility] called stating that they are needing a DX code for insurance for pt's ensure. Pt stated that she is on ensure because it helps with her appetite. Her weight is up and down. Anette Forbes would like us to fax something with DX code for them to be able to bill insurance for the Ensure.      Fax to [phone]    Anette Forbes may be reached at [phone]

## 2021-05-13 RX ORDER — CALORIC SUPPLEMENT
1 LIQUID (ML) ORAL 2 TIMES DAILY
Qty: 60 BOTTLE | Refills: 5 | Status: SHIPPED | OUTPATIENT
Start: 2021-05-13

## 2021-05-13 NOTE — TELEPHONE ENCOUNTER
Faxed RX to number provided. Tried calling [facility]. Got on call nurse, Otilia Ga. She must not have been able to hear me. She hung up on me. Will try calling office tomorrow to inform new RX has been sent.

## 2021-05-14 NOTE — TELEPHONE ENCOUNTER
Called [facility] and spoke to [address] we faxed over the RX. She was going to let Giana Block know. She stated that they would call if they need anything else.

## 2021-05-20 ENCOUNTER — TELEPHONE (OUTPATIENT)
Dept: FAMILY MEDICINE CLINIC | Age: 86
End: 2021-05-20

## 2021-05-20 NOTE — TELEPHONE ENCOUNTER
[place], [address] [facility] ([phone]) called - pt has been very tired the past couple weeks. After gets up eats breakfast then wants to go back to bed and sleep. Last week BP was 120/72, pulse 56. This week BP is 112/60, pulse 52. When she stood up BP dropped 96/52 and pulse stayed 52. She did not get up real fast.  She also felt \"a little wobbly\" when she stood up. No other changes, diet etc.  She is taking metoprolol 25 mg 1/2 tab twice daily.

## 2021-05-21 RX ORDER — LORATADINE 10 MG/1
TABLET ORAL
Qty: 90 TABLET | Refills: 1 | Status: SHIPPED | OUTPATIENT
Start: 2021-05-21 | End: 2021-11-16

## 2021-05-25 ENCOUNTER — OFFICE VISIT (OUTPATIENT)
Dept: FAMILY MEDICINE CLINIC | Age: 86
End: 2021-05-25

## 2021-05-25 VITALS
DIASTOLIC BLOOD PRESSURE: 58 MMHG | WEIGHT: 136 LBS | SYSTOLIC BLOOD PRESSURE: 116 MMHG | HEIGHT: 60 IN | OXYGEN SATURATION: 95 % | TEMPERATURE: 97.6 F | HEART RATE: 72 BPM | RESPIRATION RATE: 16 BRPM | BODY MASS INDEX: 26.7 KG/M2

## 2021-05-25 DIAGNOSIS — F41.9 ANXIETY: ICD-10-CM

## 2021-05-25 DIAGNOSIS — G47.9 SLEEP DIFFICULTIES: ICD-10-CM

## 2021-05-25 DIAGNOSIS — R53.83 FATIGUE, UNSPECIFIED TYPE: Primary | ICD-10-CM

## 2021-05-25 DIAGNOSIS — R53.81 PHYSICAL DECONDITIONING: ICD-10-CM

## 2021-05-25 PROCEDURE — 1036F TOBACCO NON-USER: CPT | Performed by: EMERGENCY MEDICINE

## 2021-05-25 PROCEDURE — G8417 CALC BMI ABV UP PARAM F/U: HCPCS | Performed by: EMERGENCY MEDICINE

## 2021-05-25 PROCEDURE — 1123F ACP DISCUSS/DSCN MKR DOCD: CPT | Performed by: EMERGENCY MEDICINE

## 2021-05-25 PROCEDURE — G8427 DOCREV CUR MEDS BY ELIG CLIN: HCPCS | Performed by: EMERGENCY MEDICINE

## 2021-05-25 PROCEDURE — 4040F PNEUMOC VAC/ADMIN/RCVD: CPT | Performed by: EMERGENCY MEDICINE

## 2021-05-25 PROCEDURE — 1090F PRES/ABSN URINE INCON ASSESS: CPT | Performed by: EMERGENCY MEDICINE

## 2021-05-25 PROCEDURE — 99214 OFFICE O/P EST MOD 30 MIN: CPT | Performed by: EMERGENCY MEDICINE

## 2021-05-25 RX ORDER — ALPRAZOLAM 0.5 MG/1
TABLET ORAL
Qty: 45 TABLET | Refills: 0 | Status: CANCELLED | OUTPATIENT
Start: 2021-05-25 | End: 2021-08-07

## 2021-05-25 RX ORDER — TRAZODONE HYDROCHLORIDE 50 MG/1
50 TABLET ORAL NIGHTLY PRN
Qty: 30 TABLET | Refills: 0 | Status: SHIPPED | OUTPATIENT
Start: 2021-05-25 | End: 2021-06-21

## 2021-05-25 RX ORDER — SIMVASTATIN 20 MG
TABLET ORAL
COMMUNITY
Start: 2021-03-12 | End: 2021-06-08

## 2021-05-25 SDOH — ECONOMIC STABILITY: FOOD INSECURITY: WITHIN THE PAST 12 MONTHS, YOU WORRIED THAT YOUR FOOD WOULD RUN OUT BEFORE YOU GOT MONEY TO BUY MORE.: NEVER TRUE

## 2021-05-25 ASSESSMENT — ENCOUNTER SYMPTOMS
SINUS PRESSURE: 0
SORE THROAT: 0
COUGH: 0
VOICE CHANGE: 0
WHEEZING: 0
DIARRHEA: 0
SHORTNESS OF BREATH: 0
CONSTIPATION: 0
VOMITING: 0
CHEST TIGHTNESS: 0
RHINORRHEA: 0
ABDOMINAL PAIN: 0
TROUBLE SWALLOWING: 0
BACK PAIN: 0
NAUSEA: 0

## 2021-05-25 NOTE — PROGRESS NOTES
tablet by mouth daily 90 tablet 1    RA BIOTIN 1000 MCG TABS use as directed      simvastatin (ZOCOR) 20 MG tablet take 1 tablet by mouth every evening      Bioflavonoid Products (RA VITAMIN C CR) TBCR as directed       No current facility-administered medications for this visit. Subjective:      Review of Systems   Constitutional: Positive for fatigue. Negative for appetite change, chills, diaphoresis and fever. HENT: Negative for congestion, ear pain, postnasal drip, rhinorrhea, sinus pressure, sneezing, sore throat, trouble swallowing and voice change. Respiratory: Negative for cough, chest tightness, shortness of breath and wheezing. Cardiovascular: Negative for chest pain, palpitations and leg swelling. Gastrointestinal: Negative for abdominal pain, constipation, diarrhea, nausea and vomiting. Musculoskeletal: Negative for arthralgias, back pain, joint swelling, myalgias, neck pain and neck stiffness. Neurological: Negative for dizziness, syncope, weakness, light-headedness, numbness and headaches. Objective:     BP (!) 116/58 (Site: Right Upper Arm, Position: Sitting, Cuff Size: Medium Adult)   Pulse 72   Temp 97.6 °F (36.4 °C) (Skin)   Resp 16   Ht 5' (1.524 m)   Wt 136 lb (61.7 kg)   LMP  (LMP Unknown)   SpO2 95% Comment: on room air  BMI 26.56 kg/m²   BP Readings from Last 3 Encounters:   05/25/21 (!) 116/58   05/04/21 (!) 154/53   03/09/21 (!) 118/52     Wt Readings from Last 3 Encounters:   05/25/21 136 lb (61.7 kg)   05/04/21 128 lb (58.1 kg)   03/09/21 128 lb 8 oz (58.3 kg)       Physical Exam  Vitals reviewed. Constitutional:       Appearance: She is well-developed. HENT:      Head: Normocephalic and atraumatic. Right Ear: External ear normal.      Left Ear: External ear normal.      Nose: Nose normal.   Eyes:      General: No scleral icterus. Conjunctiva/sclera: Conjunctivae normal.      Pupils: Pupils are equal, round, and reactive to light.    Neck: Thyroid: No thyromegaly. Vascular: No JVD. Cardiovascular:      Rate and Rhythm: Normal rate and regular rhythm. Heart sounds: No murmur heard. No friction rub. Pulmonary:      Effort: Pulmonary effort is normal.      Breath sounds: Normal breath sounds. No wheezing or rales. Chest:      Chest wall: No tenderness. Abdominal:      General: Bowel sounds are normal.      Palpations: Abdomen is soft. There is no mass. Tenderness: There is no abdominal tenderness. Musculoskeletal:      Cervical back: Normal range of motion and neck supple. Lymphadenopathy:      Cervical: No cervical adenopathy. Skin:     Findings: No rash. Neurological:      Mental Status: She is alert and oriented to person, place, and time. Psychiatric:         Behavior: Behavior is cooperative. Assessment:         Diagnosis Orders   1. Fatigue, unspecified type     2. Anxiety     3. Sleep difficulties     4. Physical deconditioning         Plan:      Medications Prescribed:  Orders Placed This Encounter   Medications    traZODone (DESYREL) 50 MG tablet     Sig: Take 1 tablet by mouth nightly as needed for Sleep     Dispense:  30 tablet     Refill:  0     Orders Placed:  No orders of the defined types were placed in this encounter. Results of Laboratory tests taken  5/4/21 were reviewed with the patient. Results were w/in  acceptable range     Return in about 4 weeks (around 6/22/2021). Discussed use, benefit, and side effects of prescribedmedications. All patient questions answered. Pt voiced understanding. Instructedto continue current medications, diet and exercise. Patient agreed with treatmentplan.

## 2021-05-26 ENCOUNTER — TELEPHONE (OUTPATIENT)
Dept: FAMILY MEDICINE CLINIC | Age: 86
End: 2021-05-26

## 2021-05-26 DIAGNOSIS — E03.4 HYPOTHYROIDISM DUE TO ACQUIRED ATROPHY OF THYROID: Primary | ICD-10-CM

## 2021-05-26 DIAGNOSIS — R63.0 ANOREXIA: ICD-10-CM

## 2021-05-26 NOTE — TELEPHONE ENCOUNTER
I spoke with Tashia Mallory, they are needing lab work that shows she is nutrionally deficient or a dx of anorexia, please advise.

## 2021-05-26 NOTE — TELEPHONE ENCOUNTER
[facility] is requesting medical charts for patient's prescription for ensure  Regarding 5/4 apt.     [phone] fax number    Please contact kelechi with any questions [phone] ext [phone]

## 2021-06-08 ENCOUNTER — OFFICE VISIT (OUTPATIENT)
Dept: FAMILY MEDICINE CLINIC | Age: 86
End: 2021-06-08

## 2021-06-08 ENCOUNTER — TELEPHONE (OUTPATIENT)
Dept: FAMILY MEDICINE CLINIC | Age: 86
End: 2021-06-08

## 2021-06-08 VITALS — HEIGHT: 60 IN | BODY MASS INDEX: 26.56 KG/M2
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
## 2021-06-08 DIAGNOSIS — S51.811A SKIN TEAR OF FOREARM WITHOUT COMPLICATION, RIGHT, INITIAL ENCOUNTER: ICD-10-CM

## 2021-06-08 PROCEDURE — G8417 CALC BMI ABV UP PARAM F/U: HCPCS | Performed by: EMERGENCY MEDICINE

## 2021-06-08 PROCEDURE — 1123F ACP DISCUSS/DSCN MKR DOCD: CPT | Performed by: EMERGENCY MEDICINE

## 2021-06-08 PROCEDURE — 1036F TOBACCO NON-USER: CPT | Performed by: EMERGENCY MEDICINE

## 2021-06-08 PROCEDURE — 99213 OFFICE O/P EST LOW 20 MIN: CPT | Performed by: EMERGENCY MEDICINE

## 2021-06-08 PROCEDURE — 4040F PNEUMOC VAC/ADMIN/RCVD: CPT | Performed by: EMERGENCY MEDICINE

## 2021-06-08 PROCEDURE — G8427 DOCREV CUR MEDS BY ELIG CLIN: HCPCS | Performed by: EMERGENCY MEDICINE

## 2021-06-08 PROCEDURE — 1090F PRES/ABSN URINE INCON ASSESS: CPT | Performed by: EMERGENCY MEDICINE

## 2021-06-08 RX ORDER — CLINDAMYCIN HYDROCHLORIDE 300 MG/1
300 CAPSULE ORAL 3 TIMES DAILY
Qty: 24 CAPSULE | Refills: 0 | Status: SHIPPED | OUTPATIENT
Start: 2021-06-08 | End: 2021-06-16

## 2021-06-08 ASSESSMENT — ENCOUNTER SYMPTOMS
CONSTIPATION: 0
BACK PAIN: 0
SHORTNESS OF BREATH: 0
VOMITING: 0
SINUS PRESSURE: 0
VOICE CHANGE: 0
ABDOMINAL PAIN: 0
NAUSEA: 0
CHEST TIGHTNESS: 0
SORE THROAT: 0
COUGH: 0
DIARRHEA: 0
WHEEZING: 0
TROUBLE SWALLOWING: 0
RHINORRHEA: 0

## 2021-06-08 NOTE — TELEPHONE ENCOUNTER
Tried calling pt on both numbers, no answer, no vm.   Left message with Marry [place] to call office

## 2021-06-08 NOTE — PROGRESS NOTES
Visit Date: 6/8/2021    Subjective:    Becka Freitas is a 80 y. o.female who presents today for:  Chief Complaint   Patient presents with    Animal Bite         HPI:     HPI     Patient is here because of bite on the right forearm 5 days ago. Patient was bitten by her own dog. The patient's wound has a skin tear and part of the skin is not present since been bleeding the patient is a blood thinner Plavix and aspirin. patient was seen by the home health nurse today and was sent here to be evaluated.       CurrentHome Medications:  Current Outpatient Medications   Medication Sig Dispense Refill    clindamycin (CLEOCIN) 300 MG capsule Take 1 capsule by mouth 3 times daily for 8 days 1 tablet 3 times a day for 10 days 24 capsule 0    RA BIOTIN 1000 MCG TABS use as directed      simvastatin (ZOCOR) 20 MG tablet take 1 tablet by mouth every evening      traZODone (DESYREL) 50 MG tablet Take 1 tablet by mouth nightly as needed for Sleep 30 tablet 0    loratadine (CLARITIN) 10 MG tablet take 1 tablet by mouth once daily 90 tablet 1    Nutritional Supplements (ENSURE ORIGINAL) LIQD Take 1 Can by mouth 2 times daily 60 Bottle 5    pantoprazole (PROTONIX) 40 MG tablet take 1 tablet by mouth once daily 90 tablet 1    sertraline (ZOLOFT) 50 MG tablet take 1 tablet by mouth twice a day 180 tablet 1    clopidogrel (PLAVIX) 75 MG tablet take 1 tablet by mouth once daily 90 tablet 1    isosorbide mononitrate (IMDUR) 30 MG extended release tablet take 1 tablet by mouth once daily 90 tablet 1    Calcium Carbonate-Vitamin D (OYSTER SHELL CALCIUM/D) 500-200 MG-UNIT TABS take 1 tablet by mouth twice a day 180 tablet 3    levothyroxine (SYNTHROID) 100 MCG tablet take 1 tablet by mouth once daily 90 tablet 1    RA ASPIRIN EC ADULT LOW ST 81 MG EC tablet take 1 tablet by mouth once daily      Bioflavonoid Products (RA VITAMIN C CR) TBCR as directed      vitamin D (ERGOCALCIFEROL) 1.25 MG (83355 UT) CAPS capsule take 1 capsule by mouth every week      promethazine (PHENERGAN) 25 MG tablet Take 1 tablet by mouth every 6 hours as needed for Nausea (and vomiting) 12 tablet 0    predniSONE (DELTASONE) 1 MG tablet Take 3 mg by mouth daily       acetaminophen (TYLENOL) 500 MG tablet Take 1,000 mg by mouth every 8 hours as needed for Pain       Multiple Vitamins-Minerals (CENTRUM SILVER ADULT 50+) TABS Take 1 tablet by mouth daily 90 tablet 1     No current facility-administered medications for this visit. Subjective:      Review of Systems   Constitutional: Negative for appetite change, chills, diaphoresis, fatigue and fever. HENT: Negative for congestion, ear pain, postnasal drip, rhinorrhea, sinus pressure, sneezing, sore throat, trouble swallowing and voice change. Respiratory: Negative for cough, chest tightness, shortness of breath and wheezing. Cardiovascular: Negative for chest pain, palpitations and leg swelling. Gastrointestinal: Negative for abdominal pain, constipation, diarrhea, nausea and vomiting. Musculoskeletal: Negative for arthralgias, back pain, joint swelling, myalgias, neck pain and neck stiffness. Skin tuear and a puncture wound forearm   Neurological: Negative for dizziness, syncope, weakness, light-headedness, numbness and headaches. Objective:     Ht 5' (1.524 m)   LMP  (LMP Unknown)   BMI 26.56 kg/m²   BP Readings from Last 3 Encounters:   05/25/21 (!) 116/58   05/04/21 (!) 154/53   03/09/21 (!) 118/52     Wt Readings from Last 3 Encounters:   05/25/21 136 lb (61.7 kg)   05/04/21 128 lb (58.1 kg)   03/09/21 128 lb 8 oz (58.3 kg)       Physical Exam  Vitals reviewed. Constitutional:       Appearance: She is well-developed. HENT:      Head: Normocephalic and atraumatic. Right Ear: External ear normal.      Left Ear: External ear normal.      Nose: Nose normal.   Eyes:      General: No scleral icterus.      Conjunctiva/sclera: Conjunctivae normal.      Pupils: Pupils are equal, round, and reactive to light. Neck:      Thyroid: No thyromegaly. Vascular: No JVD. Cardiovascular:      Rate and Rhythm: Normal rate and regular rhythm. Heart sounds: No murmur heard. No friction rub. Pulmonary:      Effort: Pulmonary effort is normal.      Breath sounds: Normal breath sounds. No wheezing or rales. Chest:      Chest wall: No tenderness. Abdominal:      General: Bowel sounds are normal.      Palpations: Abdomen is soft. There is no mass. Tenderness: There is no abdominal tenderness. Musculoskeletal:        Arms:       Cervical back: Normal range of motion and neck supple. Lymphadenopathy:      Cervical: No cervical adenopathy. Skin:     Findings: No rash. Neurological:      Mental Status: She is alert and oriented to person, place, and time. Psychiatric:         Behavior: Behavior is cooperative. Assessment:         Diagnosis Orders   1. Dog bite, initial encounter  RI DEBRIDEMENT OPEN WOUND 20 SQ CM<   2. Skin tear of forearm without complication, right, initial encounter  RI DEBRIDEMENT OPEN WOUND 20 SQ CM<       Plan:      Medications Prescribed:  Orders Placed This Encounter   Medications    clindamycin (CLEOCIN) 300 MG capsule     Sig: Take 1 capsule by mouth 3 times daily for 8 days 1 tablet 3 times a day for 10 days     Dispense:  24 capsule     Refill:  0     Orders Placed:  Orders Placed This Encounter   Procedures    RI DEBRIDEMENT OPEN WOUND 20 SQ CM<                         Wound was irrigated thoroughly with Betadine followed by sterile water. Wound was debrided, apply steri strip, triple antibiotic, pressure dressing and Coban     Return in about 1 week (around 6/15/2021). Discussed use, benefit, and side effects of prescribedmedications. All patient questions answered. Pt voiced understanding. Instructedto continue current medications, diet and exercise. Patient agreed with treatmentplan.

## 2021-06-08 NOTE — TELEPHONE ENCOUNTER
Date of last visit:  5/25/2021   Date of next visit:  6/29/2021    Requested Prescriptions     Pending Prescriptions Disp Refills    simvastatin (ZOCOR) 20 MG tablet [Pharmacy Med Name: SIMVASTATIN 20 MG TABLET] 90 tablet 1     Sig: take 1 tablet by mouth every evening

## 2021-06-09 NOTE — TELEPHONE ENCOUNTER
Yesika Hui from [facility] called stating they need a referral from us to do these dressing changes.     Please fax to [facility] [phone]

## 2021-06-10 RX ORDER — SIMVASTATIN 20 MG
TABLET ORAL
Qty: 90 TABLET | Refills: 1 | Status: SHIPPED | OUTPATIENT
Start: 2021-06-10 | End: 2021-12-03 | Stop reason: SDUPTHER

## 2021-06-15 ENCOUNTER — OFFICE VISIT (OUTPATIENT)
Dept: FAMILY MEDICINE CLINIC | Age: 86
End: 2021-06-15

## 2021-06-15 VITALS
BODY MASS INDEX: 26.95 KG/M2 | HEART RATE: 76 BPM | HEIGHT: 60 IN | WEIGHT: 137.25 LBS | DIASTOLIC BLOOD PRESSURE: 70 MMHG | TEMPERATURE: 96.6 F | SYSTOLIC BLOOD PRESSURE: 112 MMHG | RESPIRATION RATE: 16 BRPM

## 2021-06-15 DIAGNOSIS — S51.811A SKIN TEAR OF FOREARM WITHOUT COMPLICATION, RIGHT, INITIAL ENCOUNTER: ICD-10-CM

## 2021-06-15 DIAGNOSIS — E16.2 HYPOGLYCEMIA: ICD-10-CM

## 2021-06-15 DIAGNOSIS — M48.07 LUMBOSACRAL SPINAL STENOSIS: ICD-10-CM
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
## 2021-06-15 DIAGNOSIS — Z74.09 IMPAIRED FUNCTIONAL MOBILITY, BALANCE, GAIT, AND ENDURANCE: ICD-10-CM

## 2021-06-15 LAB
CHP ED QC CHECK: NORMAL
GLUCOSE BLD-MCNC: 76 MG/DL
HBA1C MFR BLD: 5.7 %

## 2021-06-15 PROCEDURE — 82962 GLUCOSE BLOOD TEST: CPT | Performed by: EMERGENCY MEDICINE

## 2021-06-15 PROCEDURE — 1123F ACP DISCUSS/DSCN MKR DOCD: CPT | Performed by: EMERGENCY MEDICINE

## 2021-06-15 PROCEDURE — 4040F PNEUMOC VAC/ADMIN/RCVD: CPT | Performed by: EMERGENCY MEDICINE

## 2021-06-15 PROCEDURE — G8417 CALC BMI ABV UP PARAM F/U: HCPCS | Performed by: EMERGENCY MEDICINE

## 2021-06-15 PROCEDURE — 1090F PRES/ABSN URINE INCON ASSESS: CPT | Performed by: EMERGENCY MEDICINE

## 2021-06-15 PROCEDURE — G8427 DOCREV CUR MEDS BY ELIG CLIN: HCPCS | Performed by: EMERGENCY MEDICINE

## 2021-06-15 PROCEDURE — 99214 OFFICE O/P EST MOD 30 MIN: CPT | Performed by: EMERGENCY MEDICINE

## 2021-06-15 PROCEDURE — 1036F TOBACCO NON-USER: CPT | Performed by: EMERGENCY MEDICINE

## 2021-06-15 PROCEDURE — 83036 HEMOGLOBIN GLYCOSYLATED A1C: CPT | Performed by: EMERGENCY MEDICINE

## 2021-06-15 ASSESSMENT — ENCOUNTER SYMPTOMS
SORE THROAT: 0
VOICE CHANGE: 0
CONSTIPATION: 0
VOMITING: 0
RHINORRHEA: 0
COUGH: 0
BACK PAIN: 0
TROUBLE SWALLOWING: 0
SINUS PRESSURE: 0
SHORTNESS OF BREATH: 0
DIARRHEA: 0
WHEEZING: 0
NAUSEA: 0
ABDOMINAL PAIN: 0
CHEST TIGHTNESS: 0

## 2021-06-15 NOTE — PROGRESS NOTES
Visit Date: 6/15/2021    Subjective:    Becka Freitas is a 80 y. o.female who presents today for:  Chief Complaint   Patient presents with   [address] Animal Bite    Orders     Walker and commode.  Hypoglycemia         HPI:     HPI     Patient is here for a checkup, she had a dog bite more than 2 weeks ago, I have seen her 1 week ago and a Steri-Strip was applied and a dressing. Patient's wounds been dressed by the home health nurse that goes to her home. Patient denies any pain,    Patient want a new walker with a seat and possible component    Patient had episode of hypoglycemia and went to the emergency room however is feeling better  CurrentHome Medications:  Current Outpatient Medications   Medication Sig Dispense Refill    blood glucose test strips (ASCENSIA AUTODISC VI;ONE TOUCH ULTRA TEST VI) strip 1 each by In Vitro route daily Test BS daily. E11.9 100 each 1    mupirocin (BACTROBAN) 2 % ointment Apply 2 times daily.  22 g 0    metoprolol tartrate (LOPRESSOR) 25 MG tablet take 1/2 tablet by mouth twice a day 90 tablet 1    simvastatin (ZOCOR) 20 MG tablet take 1 tablet by mouth every evening 90 tablet 1    clindamycin (CLEOCIN) 300 MG capsule Take 1 capsule by mouth 3 times daily for 8 days 1 tablet 3 times a day for 10 days 24 capsule 0    RA BIOTIN 1000 MCG TABS use as directed      traZODone (DESYREL) 50 MG tablet Take 1 tablet by mouth nightly as needed for Sleep 30 tablet 0    loratadine (CLARITIN) 10 MG tablet take 1 tablet by mouth once daily 90 tablet 1    Nutritional Supplements (ENSURE ORIGINAL) LIQD Take 1 Can by mouth 2 times daily 60 Bottle 5    pantoprazole (PROTONIX) 40 MG tablet take 1 tablet by mouth once daily 90 tablet 1    sertraline (ZOLOFT) 50 MG tablet take 1 tablet by mouth twice a day 180 tablet 1    clopidogrel (PLAVIX) 75 MG tablet take 1 tablet by mouth once daily 90 tablet 1    isosorbide mononitrate (IMDUR) 30 MG extended release tablet take 1 tablet by mouth once daily 90 tablet 1    Calcium Carbonate-Vitamin D (OYSTER SHELL CALCIUM/D) 500-200 MG-UNIT TABS take 1 tablet by mouth twice a day 180 tablet 3    levothyroxine (SYNTHROID) 100 MCG tablet take 1 tablet by mouth once daily 90 tablet 1    RA ASPIRIN EC ADULT LOW ST 81 MG EC tablet take 1 tablet by mouth once daily      Bioflavonoid Products (RA VITAMIN C CR) TBCR as directed      vitamin D (ERGOCALCIFEROL) 1.25 MG (40252 UT) CAPS capsule take 1 capsule by mouth every week      promethazine (PHENERGAN) 25 MG tablet Take 1 tablet by mouth every 6 hours as needed for Nausea (and vomiting) 12 tablet 0    predniSONE (DELTASONE) 1 MG tablet Take 3 mg by mouth daily       acetaminophen (TYLENOL) 500 MG tablet Take 1,000 mg by mouth every 8 hours as needed for Pain       Multiple Vitamins-Minerals (CENTRUM SILVER ADULT 50+) TABS Take 1 tablet by mouth daily 90 tablet 1     No current facility-administered medications for this visit. Subjective:      Review of Systems   Constitutional: Negative for appetite change, chills, diaphoresis, fatigue and fever. HENT: Negative for congestion, ear pain, postnasal drip, rhinorrhea, sinus pressure, sneezing, sore throat, trouble swallowing and voice change. Respiratory: Negative for cough, chest tightness, shortness of breath and wheezing. Cardiovascular: Negative for chest pain, palpitations and leg swelling. Gastrointestinal: Negative for abdominal pain, constipation, diarrhea, nausea and vomiting. Musculoskeletal: Negative for arthralgias, back pain, joint swelling, myalgias, neck pain and neck stiffness. Wound dog bite right arm   Neurological: Negative for dizziness, syncope, weakness, light-headedness, numbness and headaches.        Objective:     /70 (Site: Left Upper Arm, Position: Sitting, Cuff Size: Medium Adult)   Pulse 76   Temp 96.6 °F (35.9 °C) (Skin)   Resp 16   Ht 5' (1.524 m)   Wt 137 lb 4 oz (62.3 kg)   LMP  (LMP Unknown)   BMI 26.80 kg/m²   BP Readings from Last 3 Encounters:   06/15/21 112/70   05/25/21 (!) 116/58   05/04/21 (!) 154/53     Wt Readings from Last 3 Encounters:   06/15/21 137 lb 4 oz (62.3 kg)   05/25/21 136 lb (61.7 kg)   05/04/21 128 lb (58.1 kg)       Physical Exam  Vitals reviewed. Constitutional:       Appearance: She is well-developed. HENT:      Head: Normocephalic and atraumatic. Right Ear: External ear normal.      Left Ear: External ear normal.      Nose: Nose normal.   Eyes:      General: No scleral icterus. Conjunctiva/sclera: Conjunctivae normal.      Pupils: Pupils are equal, round, and reactive to light. Neck:      Thyroid: No thyromegaly. Vascular: No JVD. Cardiovascular:      Rate and Rhythm: Normal rate and regular rhythm. Heart sounds: No murmur heard. No friction rub. Pulmonary:      Effort: Pulmonary effort is normal.      Breath sounds: Normal breath sounds. No wheezing or rales. Chest:      Chest wall: No tenderness. Abdominal:      General: Bowel sounds are normal.      Palpations: Abdomen is soft. There is no mass. Tenderness: There is no abdominal tenderness. Musculoskeletal:      Cervical back: Normal range of motion and neck supple. Comments: Right forearm showed a granulation tissue in the area of the skin that has a skin tear, Steri-Strip was applied and healing well without any sign infection. Lymphadenopathy:      Cervical: No cervical adenopathy. Skin:     Findings: No rash. Neurological:      Mental Status: She is alert and oriented to person, place, and time. Psychiatric:         Behavior: Behavior is cooperative. Assessment:         Diagnosis Orders   1. Dog bite, subsequent encounter     2. Hypoglycemia  POCT Glucose    POCT glycosylated hemoglobin (Hb A1C)   3. Skin tear of forearm without complication, right, initial encounter     4. Lumbosacral spinal stenosis  DME Order for Chelita Samuel as OP   5.  Impaired

## 2021-06-16 ENCOUNTER — TELEPHONE (OUTPATIENT)
Dept: FAMILY MEDICINE CLINIC | Age: 86
End: 2021-06-16

## 2021-06-16 NOTE — TELEPHONE ENCOUNTER
Karina from [facility] called stating that Roque Valenzuela is suppose to put Bactroban on the wound twice a day but [facility] only has an order to go out every other day to change the dressing. Patient unable to do this herself.     Please call Karina at [facility] [phone]

## 2021-06-18 NOTE — TELEPHONE ENCOUNTER
Spoke with patient- she stated she can not change the bandage herself. She does not want it changed more than the every other day it is being changed now. Spoke with Dante Castellanos- nurse from [facility]- they will continue dressing changes every other day.

## 2021-06-21 ENCOUNTER — TELEPHONE (OUTPATIENT)
Dept: FAMILY MEDICINE CLINIC | Age: 86
End: 2021-06-21

## 2021-06-21 RX ORDER — TRAZODONE HYDROCHLORIDE 50 MG/1
TABLET ORAL
Qty: 30 TABLET | Refills: 0 | Status: ON HOLD | OUTPATIENT
Start: 2021-06-21 | End: 2021-07-19

## 2021-06-21 NOTE — TELEPHONE ENCOUNTER
Ruthanna Ormond from [facility] called stating pt fell again on 6-19-21. Pt complaining of feeling \"fuzzy and weak legged upon standing\". Ruthanna Ormond informed me pt's BP take today was   Sitting 100/68 pulse 58  Standing 90/56 pulse 58  Sitting again for 5 minutes 110/68 pulse 54    Pt has already stopped the RX Metoprolol per Ruthanna Ormond.     Any questions call Ruthanna Ormond at [phone]

## 2021-06-21 NOTE — TELEPHONE ENCOUNTER
Check with andi about how the patient is eating and drinking? Is she on the Boost? If so she should have at least 3 bottles daily. Could she call the vitals in 2 days?

## 2021-06-22 NOTE — TELEPHONE ENCOUNTER
Eliazar Carpenter called back. Said that she is using Boost two daily. She will definitely put in that she should be on at least three Boost.    Wondering if can take the dressing changes down to twice a week? The wound is healing, not completely closed. Area is drying, not much drainage.

## 2021-06-28 ENCOUNTER — APPOINTMENT (OUTPATIENT)
Dept: MRI IMAGING | Age: 86
DRG: 025 | End: 2021-06-28
Payer: MEDICARE

## 2021-06-28 ENCOUNTER — APPOINTMENT (OUTPATIENT)
Dept: CT IMAGING | Age: 86
DRG: 025 | End: 2021-06-28
Payer: MEDICARE

## 2021-06-28 ENCOUNTER — HOSPITAL ENCOUNTER (INPATIENT)
Age: 86
LOS: 10 days | Discharge: NURSING, REHAB OR LONG-TERM CARE | DRG: 025 | End: 2021-07-08
Attending: EMERGENCY MEDICINE | Admitting: EMERGENCY MEDICINE
Payer: MEDICARE

## 2021-06-28 ENCOUNTER — APPOINTMENT (OUTPATIENT)
Dept: ULTRASOUND IMAGING | Age: 86
DRG: 025 | End: 2021-06-28
Payer: MEDICARE

## 2021-06-28 DIAGNOSIS — G93.89 BRAIN MASS: ICD-10-CM

## 2021-06-28 DIAGNOSIS — I61.9 INTRAPARENCHYMAL HEMORRHAGE OF BRAIN (HCC): ICD-10-CM
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
## 2021-06-28 DIAGNOSIS — S09.90XA CLOSED HEAD INJURY, INITIAL ENCOUNTER: ICD-10-CM

## 2021-06-28 PROBLEM — Z79.02 PLATELET INHIBITION DUE TO PLAVIX: Status: ACTIVE | Noted: 2021-06-28

## 2021-06-28 PROBLEM — S80.12XA TRAUMATIC ECCHYMOSIS OF LEFT LOWER LEG: Status: ACTIVE | Noted: 2021-06-28
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
## 2021-06-28 PROBLEM — S00.83XA TRAUMATIC ECCHYMOSIS OF FOREHEAD: Status: ACTIVE | Noted: 2021-06-28

## 2021-06-28 PROBLEM — S50.12XA: Status: ACTIVE | Noted: 2021-06-28

## 2021-06-28 PROBLEM — S06.6X0A SUBARACHNOID HEMORRHAGE FOLLOWING INJURY, NO LOSS OF CONSCIOUSNESS (HCC): Status: ACTIVE | Noted: 2021-06-28

## 2021-06-28 LAB
ABO: NORMAL
ACT TEG: 105 SECONDS (ref 86–118)
ALBUMIN SERPL-MCNC: 4.1 G/DL (ref 3.5–5.1)
ALP BLD-CCNC: 39 U/L (ref 38–126)
ALT SERPL-CCNC: 19 U/L (ref 11–66)
AMPHETAMINE+METHAMPHETAMINE URINE SCREEN: NEGATIVE
ANGLE, RAPID TEG: 77.2 DEG (ref 64–80)
ANION GAP SERPL CALCULATED.3IONS-SCNC: 6 MEQ/L (ref 8–16)
ANTIBODY SCREEN: NORMAL
AST SERPL-CCNC: 28 U/L (ref 5–40)
BARBITURATE QUANTITATIVE URINE: NEGATIVE
BASOPHILS # BLD: 0.1 %
BASOPHILS ABSOLUTE: 0 THOU/MM3 (ref 0–0.1)
BENZODIAZEPINE QUANTITATIVE URINE: NEGATIVE
BILIRUB SERPL-MCNC: 0.5 MG/DL (ref 0.3–1.2)
BUN BLDV-MCNC: 19 MG/DL (ref 7–22)
CALCIUM SERPL-MCNC: 9.2 MG/DL (ref 8.5–10.5)
CANNABINOID QUANTITATIVE URINE: NEGATIVE
CHLORIDE BLD-SCNC: 104 MEQ/L (ref 98–111)
CO2: 28 MEQ/L (ref 23–33)
COCAINE METABOLITE QUANTITATIVE URINE: NEGATIVE
CREAT SERPL-MCNC: 0.6 MG/DL (ref 0.4–1.2)
EKG ATRIAL RATE: 62 BPM
EKG P AXIS: 86 DEGREES
EKG P-R INTERVAL: 154 MS
EKG Q-T INTERVAL: 434 MS
EKG QRS DURATION: 80 MS
EKG QTC CALCULATION (BAZETT): 440 MS
EKG R AXIS: 9 DEGREES
EKG T AXIS: 161 DEGREES
EKG VENTRICULAR RATE: 62 BPM
EOSINOPHIL # BLD: 1.3 %
EOSINOPHILS ABSOLUTE: 0.1 THOU/MM3 (ref 0–0.4)
EPL-TEG: 0 % (ref 0–15)
ERYTHROCYTE [DISTWIDTH] IN BLOOD BY AUTOMATED COUNT: 13.9 % (ref 11.5–14.5)
ERYTHROCYTE [DISTWIDTH] IN BLOOD BY AUTOMATED COUNT: 48.9 FL (ref 35–45)
ETHYL ALCOHOL, SERUM: < 0.01 %
GFR SERPL CREATININE-BSD FRML MDRD: > 90 ML/MIN/1.73M2
GLUCOSE BLD-MCNC: 117 MG/DL (ref 70–108)
GLUCOSE BLD-MCNC: 81 MG/DL (ref 70–108)
GLUCOSE BLD-MCNC: 88 MG/DL (ref 70–108)
GLUCOSE BLD-MCNC: 94 MG/DL (ref 70–108)
HCT VFR BLD CALC: 39.1 % (ref 37–47)
HEMOGLOBIN: 12.1 GM/DL (ref 12–16)
HEPARIN THERAPY: NO
IMMATURE GRANS (ABS): 0.03 THOU/MM3 (ref 0–0.07)
IMMATURE GRANULOCYTES: 0.4 %
KINETICS RAPID TEG: 1.1 MINUTES (ref 0.5–2.3)
LY30 (LYSIS) TEG: 0 % (ref 0–7.5)
LYMPHOCYTES # BLD: 24.2 %
LYMPHOCYTES ABSOLUTE: 2.1 THOU/MM3 (ref 1–4.8)
MA(MAX CLOT) RAPID TEG: 65.9 MM (ref 52–71)
MCH RBC QN AUTO: 29.5 PG (ref 26–33)
MCHC RBC AUTO-ENTMCNC: 30.9 GM/DL (ref 32.2–35.5)
MCV RBC AUTO: 95.4 FL (ref 81–99)
MONOCYTES # BLD: 7.4 %
MONOCYTES ABSOLUTE: 0.6 THOU/MM3 (ref 0.4–1.3)
MRSA SCREEN RT-PCR: NEGATIVE
NUCLEATED RED BLOOD CELLS: 0 /100 WBC
OPIATES, URINE: NEGATIVE
OSMOLALITY CALCULATION: 277 MOSMOL/KG (ref 275–300)
OXYCODONE: NEGATIVE
PHENCYCLIDINE QUANTITATIVE URINE: NEGATIVE
PLATELET # BLD: 150 THOU/MM3 (ref 130–400)
PMV BLD AUTO: 11.4 FL (ref 9.4–12.4)
POTASSIUM REFLEX MAGNESIUM: 4.1 MEQ/L (ref 3.5–5.2)
PRO-BNP: 1346 PG/ML (ref 0–1800)
RBC # BLD: 4.1 MILL/MM3 (ref 4.2–5.4)
REACTION TIME RAPID TEG: 0.6 MINUTES (ref 0.4–1)
RH FACTOR: NORMAL
SEG NEUTROPHILS: 66.6 %
SEGMENTED NEUTROPHILS ABSOLUTE COUNT: 5.7 THOU/MM3 (ref 1.8–7.7)
SODIUM BLD-SCNC: 138 MEQ/L (ref 135–145)
TOTAL CK: 70 U/L (ref 30–135)
TOTAL PROTEIN: 6.2 G/DL (ref 6.1–8)
TROPONIN T: 0.01 NG/ML
VANCOMYCIN RESISTANT ENTEROCOCCUS: NEGATIVE
WBC # BLD: 8.5 THOU/MM3 (ref 4.8–10.8)

## 2021-06-28 PROCEDURE — 85025 COMPLETE CBC W/AUTO DIFF WBC: CPT

## 2021-06-28 PROCEDURE — 2500000003 HC RX 250 WO HCPCS: Performed by: NURSE PRACTITIONER

## 2021-06-28 PROCEDURE — 80307 DRUG TEST PRSMV CHEM ANLYZR: CPT

## 2021-06-28 PROCEDURE — 72125 CT NECK SPINE W/O DYE: CPT

## 2021-06-28 PROCEDURE — 82550 ASSAY OF CK (CPK): CPT

## 2021-06-28 PROCEDURE — 99291 CRITICAL CARE FIRST HOUR: CPT | Performed by: INTERNAL MEDICINE

## 2021-06-28 PROCEDURE — 76376 3D RENDER W/INTRP POSTPROCES: CPT

## 2021-06-28 PROCEDURE — 82948 REAGENT STRIP/BLOOD GLUCOSE: CPT

## 2021-06-28 PROCEDURE — 70553 MRI BRAIN STEM W/O & W/DYE: CPT

## 2021-06-28 PROCEDURE — 99223 1ST HOSP IP/OBS HIGH 75: CPT | Performed by: SURGERY

## 2021-06-28 PROCEDURE — 2580000003 HC RX 258: Performed by: NURSE PRACTITIONER

## 2021-06-28 PROCEDURE — 36430 TRANSFUSION BLD/BLD COMPNT: CPT

## 2021-06-28 PROCEDURE — 86850 RBC ANTIBODY SCREEN: CPT

## 2021-06-28 PROCEDURE — 80053 COMPREHEN METABOLIC PANEL: CPT

## 2021-06-28 PROCEDURE — P9035 PLATELET PHERES LEUKOREDUCED: HCPCS

## 2021-06-28 PROCEDURE — 86901 BLOOD TYPING SEROLOGIC RH(D): CPT

## 2021-06-28 PROCEDURE — 92523 SPEECH SOUND LANG COMPREHEN: CPT

## 2021-06-28 PROCEDURE — 6360000002 HC RX W HCPCS: Performed by: NURSE PRACTITIONER

## 2021-06-28 PROCEDURE — 93005 ELECTROCARDIOGRAM TRACING: CPT | Performed by: EMERGENCY MEDICINE

## 2021-06-28 PROCEDURE — 86900 BLOOD TYPING SEROLOGIC ABO: CPT

## 2021-06-28 PROCEDURE — 82077 ASSAY SPEC XCP UR&BREATH IA: CPT

## 2021-06-28 PROCEDURE — 76705 ECHO EXAM OF ABDOMEN: CPT

## 2021-06-28 PROCEDURE — 70486 CT MAXILLOFACIAL W/O DYE: CPT

## 2021-06-28 PROCEDURE — 6360000004 HC RX CONTRAST MEDICATION: Performed by: EMERGENCY MEDICINE

## 2021-06-28 PROCEDURE — 00B70ZZ EXCISION OF CEREBRAL HEMISPHERE, OPEN APPROACH: ICD-10-PCS | Performed by: NEUROLOGICAL SURGERY

## 2021-06-28 PROCEDURE — APPSS180 APP SPLIT SHARED TIME > 60 MINUTES: Performed by: NURSE PRACTITIONER

## 2021-06-28 PROCEDURE — 87081 CULTURE SCREEN ONLY: CPT

## 2021-06-28 PROCEDURE — 86923 COMPATIBILITY TEST ELECTRIC: CPT

## 2021-06-28 PROCEDURE — 74177 CT ABD & PELVIS W/CONTRAST: CPT

## 2021-06-28 PROCEDURE — A9579 GAD-BASE MR CONTRAST NOS,1ML: HCPCS | Performed by: NURSE PRACTITIONER

## 2021-06-28 PROCEDURE — 6360000004 HC RX CONTRAST MEDICATION: Performed by: NURSE PRACTITIONER

## 2021-06-28 PROCEDURE — 2000000000 HC ICU R&B

## 2021-06-28 PROCEDURE — 84484 ASSAY OF TROPONIN QUANT: CPT

## 2021-06-28 PROCEDURE — 36415 COLL VENOUS BLD VENIPUNCTURE: CPT

## 2021-06-28 PROCEDURE — 70450 CT HEAD/BRAIN W/O DYE: CPT

## 2021-06-28 PROCEDURE — 97129 THER IVNTJ 1ST 15 MIN: CPT

## 2021-06-28 PROCEDURE — 92610 EVALUATE SWALLOWING FUNCTION: CPT

## 2021-06-28 PROCEDURE — 6370000000 HC RX 637 (ALT 250 FOR IP): Performed by: NURSE PRACTITIONER

## 2021-06-28 PROCEDURE — 99291 CRITICAL CARE FIRST HOUR: CPT | Performed by: NEUROLOGICAL SURGERY

## 2021-06-28 PROCEDURE — 99285 EMERGENCY DEPT VISIT HI MDM: CPT

## 2021-06-28 PROCEDURE — 71260 CT THORAX DX C+: CPT

## 2021-06-28 PROCEDURE — 83880 ASSAY OF NATRIURETIC PEPTIDE: CPT

## 2021-06-28 PROCEDURE — 87500 VANOMYCIN DNA AMP PROBE: CPT

## 2021-06-28 PROCEDURE — 87641 MR-STAPH DNA AMP PROBE: CPT

## 2021-06-28 PROCEDURE — 6820000002 HC L2 INJURY CALL ACTIVATION: Performed by: SURGERY

## 2021-06-28 RX ORDER — DEXAMETHASONE SODIUM PHOSPHATE 4 MG/ML
4 INJECTION, SOLUTION INTRA-ARTICULAR; INTRALESIONAL; INTRAMUSCULAR; INTRAVENOUS; SOFT TISSUE DAILY
Status: DISCONTINUED | OUTPATIENT
Start: 2021-06-28 | End: 2021-07-03

## 2021-06-28 RX ORDER — ACETAMINOPHEN 325 MG/1
650 TABLET ORAL EVERY 4 HOURS PRN
Status: DISCONTINUED | OUTPATIENT
Start: 2021-06-28 | End: 2021-07-01

## 2021-06-28 RX ORDER — NICOTINE POLACRILEX 4 MG
15 LOZENGE BUCCAL PRN
Status: DISCONTINUED | OUTPATIENT
Start: 2021-06-28 | End: 2021-07-08 | Stop reason: HOSPADM

## 2021-06-28 RX ORDER — PREDNISONE 1 MG/1
3 TABLET ORAL DAILY
Status: DISCONTINUED | OUTPATIENT
Start: 2021-06-28 | End: 2021-06-28 | Stop reason: ALTCHOICE

## 2021-06-28 RX ORDER — SODIUM CHLORIDE 0.9 % (FLUSH) 0.9 %
5-40 SYRINGE (ML) INJECTION EVERY 12 HOURS SCHEDULED
Status: DISCONTINUED | OUTPATIENT
Start: 2021-06-28 | End: 2021-07-02 | Stop reason: SDUPTHER

## 2021-06-28 RX ORDER — POLYETHYLENE GLYCOL 3350 17 G/17G
17 POWDER, FOR SOLUTION ORAL DAILY
Status: DISCONTINUED | OUTPATIENT
Start: 2021-06-28 | End: 2021-07-08 | Stop reason: HOSPADM

## 2021-06-28 RX ORDER — SODIUM CHLORIDE 9 MG/ML
25 INJECTION, SOLUTION INTRAVENOUS PRN
Status: DISCONTINUED | OUTPATIENT
Start: 2021-06-28 | End: 2021-07-02

## 2021-06-28 RX ORDER — SODIUM CHLORIDE 0.9 % (FLUSH) 0.9 %
5-40 SYRINGE (ML) INJECTION PRN
Status: DISCONTINUED | OUTPATIENT
Start: 2021-06-28 | End: 2021-07-02 | Stop reason: SDUPTHER

## 2021-06-28 RX ORDER — ONDANSETRON 4 MG/1
4 TABLET, ORALLY DISINTEGRATING ORAL EVERY 8 HOURS PRN
Status: DISCONTINUED | OUTPATIENT
Start: 2021-06-28 | End: 2021-07-08 | Stop reason: HOSPADM

## 2021-06-28 RX ORDER — OYSTER SHELL CALCIUM WITH VITAMIN D 500; 200 MG/1; [IU]/1
1 TABLET, FILM COATED ORAL 2 TIMES DAILY
Status: DISCONTINUED | OUTPATIENT
Start: 2021-06-28 | End: 2021-07-02

## 2021-06-28 RX ORDER — TRAZODONE HYDROCHLORIDE 50 MG/1
50 TABLET ORAL NIGHTLY PRN
Status: DISCONTINUED | OUTPATIENT
Start: 2021-06-28 | End: 2021-07-02

## 2021-06-28 RX ORDER — DEXTROSE MONOHYDRATE 25 G/50ML
12.5 INJECTION, SOLUTION INTRAVENOUS PRN
Status: DISCONTINUED | OUTPATIENT
Start: 2021-06-28 | End: 2021-07-08 | Stop reason: HOSPADM

## 2021-06-28 RX ORDER — DEXTROSE MONOHYDRATE 50 MG/ML
100 INJECTION, SOLUTION INTRAVENOUS PRN
Status: DISCONTINUED | OUTPATIENT
Start: 2021-06-28 | End: 2021-07-08 | Stop reason: HOSPADM

## 2021-06-28 RX ORDER — PANTOPRAZOLE SODIUM 40 MG/1
40 TABLET, DELAYED RELEASE ORAL
Status: DISCONTINUED | OUTPATIENT
Start: 2021-06-29 | End: 2021-07-08 | Stop reason: HOSPADM

## 2021-06-28 RX ORDER — BISACODYL 10 MG
10 SUPPOSITORY, RECTAL RECTAL DAILY
Status: CANCELLED | OUTPATIENT
Start: 2021-06-28

## 2021-06-28 RX ORDER — ATORVASTATIN CALCIUM 10 MG/1
10 TABLET, FILM COATED ORAL NIGHTLY
Status: DISCONTINUED | OUTPATIENT
Start: 2021-06-28 | End: 2021-07-08 | Stop reason: HOSPADM

## 2021-06-28 RX ORDER — LIDOCAINE 4 G/G
2 PATCH TOPICAL DAILY
Status: DISCONTINUED | OUTPATIENT
Start: 2021-06-28 | End: 2021-07-08 | Stop reason: HOSPADM

## 2021-06-28 RX ORDER — SODIUM PHOSPHATE, DIBASIC AND SODIUM PHOSPHATE, MONOBASIC 7; 19 G/133ML; G/133ML
1 ENEMA RECTAL DAILY PRN
Status: DISCONTINUED | OUTPATIENT
Start: 2021-06-28 | End: 2021-07-08 | Stop reason: HOSPADM

## 2021-06-28 RX ORDER — SODIUM CHLORIDE 9 MG/ML
INJECTION, SOLUTION INTRAVENOUS CONTINUOUS
Status: DISCONTINUED | OUTPATIENT
Start: 2021-06-28 | End: 2021-06-30

## 2021-06-28 RX ORDER — SODIUM CHLORIDE 9 MG/ML
INJECTION, SOLUTION INTRAVENOUS PRN
Status: DISCONTINUED | OUTPATIENT
Start: 2021-06-28 | End: 2021-07-02

## 2021-06-28 RX ORDER — ONDANSETRON 2 MG/ML
4 INJECTION INTRAMUSCULAR; INTRAVENOUS EVERY 6 HOURS PRN
Status: DISCONTINUED | OUTPATIENT
Start: 2021-06-28 | End: 2021-07-08 | Stop reason: HOSPADM

## 2021-06-28 RX ORDER — MANNITOL 20 G/100ML
50 INJECTION, SOLUTION INTRAVENOUS EVERY 8 HOURS
Status: COMPLETED | OUTPATIENT
Start: 2021-06-28 | End: 2021-06-29

## 2021-06-28 RX ORDER — CETIRIZINE HYDROCHLORIDE 10 MG/1
10 TABLET ORAL DAILY
Status: DISCONTINUED | OUTPATIENT
Start: 2021-06-28 | End: 2021-07-02

## 2021-06-28 RX ORDER — ISOSORBIDE MONONITRATE 30 MG/1
30 TABLET, EXTENDED RELEASE ORAL DAILY
Status: DISCONTINUED | OUTPATIENT
Start: 2021-06-28 | End: 2021-07-08 | Stop reason: HOSPADM

## 2021-06-28 RX ORDER — MULTIVITAMIN WITH IRON
1 TABLET ORAL DAILY
Status: DISCONTINUED | OUTPATIENT
Start: 2021-06-28 | End: 2021-07-02

## 2021-06-28 RX ORDER — DOCUSATE SODIUM 100 MG/1
100 CAPSULE, LIQUID FILLED ORAL 2 TIMES DAILY
Status: DISCONTINUED | OUTPATIENT
Start: 2021-06-28 | End: 2021-07-08 | Stop reason: HOSPADM

## 2021-06-28 RX ORDER — LEVOTHYROXINE SODIUM 0.1 MG/1
100 TABLET ORAL DAILY
Status: DISCONTINUED | OUTPATIENT
Start: 2021-06-28 | End: 2021-07-08 | Stop reason: HOSPADM

## 2021-06-28 RX ADMIN — SODIUM CHLORIDE: 9 INJECTION, SOLUTION INTRAVENOUS at 12:57

## 2021-06-28 RX ADMIN — ISOSORBIDE MONONITRATE 30 MG: 30 TABLET ORAL at 14:43

## 2021-06-28 RX ADMIN — ATORVASTATIN CALCIUM 10 MG: 10 TABLET, FILM COATED ORAL at 21:21

## 2021-06-28 RX ADMIN — CALCIUM CARBONATE-VITAMIN D TAB 500 MG-200 UNIT 1 TABLET: 500-200 TAB at 21:21

## 2021-06-28 RX ADMIN — TRANEXAMIC ACID 1000 MG: 1 INJECTION, SOLUTION INTRAVENOUS at 13:01

## 2021-06-28 RX ADMIN — DEXAMETHASONE SODIUM PHOSPHATE 4 MG: 4 INJECTION, SOLUTION INTRA-ARTICULAR; INTRALESIONAL; INTRAMUSCULAR; INTRAVENOUS; SOFT TISSUE at 18:11

## 2021-06-28 RX ADMIN — CETIRIZINE HYDROCHLORIDE 10 MG: 10 TABLET, FILM COATED ORAL at 14:43

## 2021-06-28 RX ADMIN — LEVETIRACETAM 1000 MG: 100 INJECTION, SOLUTION INTRAVENOUS at 11:36

## 2021-06-28 RX ADMIN — DOCUSATE SODIUM 100 MG: 100 CAPSULE, LIQUID FILLED ORAL at 21:21

## 2021-06-28 RX ADMIN — PREDNISONE 3 MG: 1 TABLET ORAL at 14:39

## 2021-06-28 RX ADMIN — GADOTERIDOL 15 ML: 279.3 INJECTION, SOLUTION INTRAVENOUS at 12:31

## 2021-06-28 RX ADMIN — SERTRALINE 50 MG: 50 TABLET, FILM COATED ORAL at 21:21

## 2021-06-28 RX ADMIN — SODIUM CHLORIDE, PRESERVATIVE FREE 10 ML: 5 INJECTION INTRAVENOUS at 21:21

## 2021-06-28 RX ADMIN — MANNITOL 50 G: 20 INJECTION, SOLUTION INTRAVENOUS at 18:14

## 2021-06-28 RX ADMIN — LEVETIRACETAM 500 MG: 100 INJECTION INTRAVENOUS at 23:02

## 2021-06-28 RX ADMIN — IOPAMIDOL 80 ML: 755 INJECTION, SOLUTION INTRAVENOUS at 09:44

## 2021-06-28 RX ADMIN — TRANEXAMIC ACID 1000 MG: 1 INJECTION, SOLUTION INTRAVENOUS at 12:44

## 2021-06-28 RX ADMIN — Medication 1 TABLET: at 14:44

## 2021-06-28 RX ADMIN — LEVOTHYROXINE SODIUM 100 MCG: 0.1 TABLET ORAL at 14:43

## 2021-06-28 ASSESSMENT — ENCOUNTER SYMPTOMS
CHOKING: 0
VOICE CHANGE: 0
NAUSEA: 0
WHEEZING: 0
BLOOD IN STOOL: 0
SHORTNESS OF BREATH: 0
EYE ITCHING: 0
COLOR CHANGE: 0
VOMITING: 0
FACIAL SWELLING: 0
EYE REDNESS: 0
BACK PAIN: 0
EYE DISCHARGE: 0
PHOTOPHOBIA: 0
COUGH: 0
RHINORRHEA: 0
STRIDOR: 0
SORE THROAT: 0
TROUBLE SWALLOWING: 0
CHEST TIGHTNESS: 0
ABDOMINAL PAIN: 0
CONSTIPATION: 0
SINUS PRESSURE: 0
ABDOMINAL DISTENTION: 0
EYE PAIN: 0
BACK PAIN: 1
APNEA: 0
DIARRHEA: 0

## 2021-06-28 ASSESSMENT — PAIN DESCRIPTION - ORIENTATION: ORIENTATION: MID

## 2021-06-28 ASSESSMENT — PAIN SCALES - GENERAL
PAINLEVEL_OUTOF10: 0

## 2021-06-28 ASSESSMENT — PAIN DESCRIPTION - LOCATION: LOCATION: GENERALIZED;BACK

## 2021-06-28 ASSESSMENT — PAIN DESCRIPTION - PAIN TYPE: TYPE: ACUTE PAIN

## 2021-06-28 NOTE — ED PROVIDER NOTES
she has some dizziness. The patient has no other acute complaints at this time. REVIEW OF SYSTEMS   Review of Systems   Constitutional: Positive for fatigue. Negative for fever. HENT: Negative for drooling, sore throat, tinnitus and trouble swallowing. Eyes: Negative for photophobia and visual disturbance. Musculoskeletal: Positive for back pain. Negative for neck pain and neck stiffness. Neurological: Positive for dizziness, light-headedness and headaches. Negative for weakness and numbness.          PAST MEDICAL AND SURGICAL HISTORY     Past Medical History:   Diagnosis Date    Anxiety     Blind right eye     Breast cancer metastasized to liver ([facility] [address] 05/10/2016    Liver lesion noted     Carotid stenosis      Left ICA 25%    Colostomy in place [facility]) 05/27/2016    Degenerative arthritis of cervical spine 5/07    GERD (gastroesophageal reflux disease) 11/06    Hypercholesteremia     Hypoestrogenism     Hypothyroidism     Lumbago     Lumbosacral spinal stenosis 05/2019    MDRO (multiple drug resistant organisms) resistance 2008    pt stated cleared    Personal history of cardiac dysrhythmia     Sigmoid diverticulosis 8/04    Spondylolisthesis of lumbosacral region 8/04    Steroid-induced hyperglycemia     Subclavian artery stenosis (HCC)     left    Superior and Inferior Pubic ramus fracture, right, closed, initial encounter ([facility] [address] 05/21/2019    SVT (supraventricular tachycardia) ([facility] [address] 6/07    Temporal arteritis (HCC)     Vertebral artery occlusion     left    Vitamin D deficiency 06/2017       Past Surgical History:   Procedure Laterality Date    CARDIAC CATHETERIZATION  5/07    CATARACT REMOVAL  right 1/08; left 2/08    CHOLECYSTECTOMY  1/03    COLONOSCOPY  11/06    COLOSTOMY  09/04/2018    REVERSAL OF COLOSTOMY    DILATION AND CURETTAGE OF UTERUS      ENDOSCOPY, COLON, DIAGNOSTIC      EYE REMOVAL Right 03/2017    EYE SURGERY      EYE SURGERY Right 11/06/2017     Donato Duron in [address]      partial    OTHER SURGICAL HISTORY  05/27/2016    robotic assisted laparoscopic anterior colon resection and end colostomy    NC OFFICE/OUTPT VISIT,PROCEDURE ONLY N/A 9/4/2018    COLOSTOMY REVERSAL AND PARASTOMAL HERNIA REPAIR performed by Bertha Carey MD at [address] [facility]  2002    UPPER GASTROINTESTINAL ENDOSCOPY  11/06     Unable to confirm at this moment, Except as available on EMR.       MEDICATIONS     Current Facility-Administered Medications:     calcium-vitamin D (OSCAL-500) 500-200 MG-UNIT per tablet 1 tablet, 1 tablet, Oral, BID, PAMELA Ruelas CNP    isosorbide mononitrate (IMDUR) extended release tablet 30 mg, 30 mg, Oral, Daily, PAMELA Ruelas - CNP, 30 mg at 06/28/21 1443    levothyroxine (SYNTHROID) tablet 100 mcg, 100 mcg, Oral, Daily, PAMELA Ruelas CNP, 100 mcg at 06/28/21 1443    cetirizine (ZYRTEC) tablet 10 mg, 10 mg, Oral, Daily, PAMELA Ruelas - CNP, 10 mg at 06/28/21 1443    multivitamin 1 tablet, 1 tablet, Oral, Daily, PAMELA Ruelas - CNP, 1 tablet at 06/28/21 1444    [START ON 6/29/2021] pantoprazole (PROTONIX) tablet 40 mg, 40 mg, Oral, QAM AC, PAMELA Ruelas - CNP    sertraline (ZOLOFT) tablet 50 mg, 50 mg, Oral, BID, PAMELA Ruelas - CNP    atorvastatin (LIPITOR) tablet 10 mg, 10 mg, Oral, Nightly, PAMELA Ruelas - CNP    traZODone (DESYREL) tablet 50 mg, 50 mg, Oral, Nightly PRN, PAMELA Ruelas - CNP    sodium chloride flush 0.9 % injection 5-40 mL, 5-40 mL, Intravenous, 2 times per day, PAMELA Ruelas - CNP    sodium chloride flush 0.9 % injection 5-40 mL, 5-40 mL, Intravenous, PRN, PAMELA Ruelas - CNP    0.9 % sodium chloride infusion, 25 mL, Intravenous, PRN, PAMELA Ruelas - CNP    ondansetron (ZOFRAN-ODT) disintegrating tablet 4 mg, 4 mg, Oral, Q8H PRN **OR** ondansetron (ZOFRAN) injection 4 mg, 4 mg, Intravenous, Q6H PRN, Rinku [place], APRN - CNP    polyethylene glycol [facility]) packet 17 g, 17 g, Oral, Daily, [place], APRN - CNP    fleet rectal enema 1 enema, 1 enema, Rectal, Daily PRN, [place]ie [place], APRN - CNP    0.9 % sodium chloride infusion, , Intravenous, Continuous, [place], APRN - CNP, Last Rate: 75 mL/hr at 06/28/21 1257, New Bag at 06/28/21 1257    acetaminophen (TYLENOL) tablet 650 mg, 650 mg, Oral, Q4H PRN, [place]ie [place], APRN - CNP    lidocaine 4 % external patch 2 patch, 2 patch, Topical, Daily, Rinku [place], APRN - CNP    levETIRAcetam (KEPPRA) 500 mg in sodium chloride 0.9 % 100 mL IVPB, 500 mg, Intravenous, Q12H, Rinku [place]es[place], APRN - CNP    [COMPLETED] tranexamic acid (CYKLOKAPRON) 1,000 mg in sodium chloride 0.9 % 100 mL IVPB, 1,000 mg, Intravenous, Once, Stopped at 06/28/21 1254 **FOLLOWED BY** tranexamic acid (CYKLOKAPRON) 1,000 mg in sodium chloride 0.9 % 100 mL IVPB, 1,000 mg, Intravenous, Once, Rinku [place], APRN - CNP, Last Rate: 12.5 mL/hr at 06/28/21 1301, 1,000 mg at 06/28/21 1301    0.9 % sodium chloride infusion, , Intravenous, PRN, [place]ie [place], APRN - CNP    insulin lispro (HUMALOG) injection vial 0-6 Units, 0-6 Units, Subcutaneous, TID WC, Rinku [place], APRN - CNP    insulin lispro (HUMALOG) injection vial 0-3 Units, 0-3 Units, Subcutaneous, Nightly, Rinku [place], APRN - CNP    glucose (GLUTOSE) 40 % oral gel 15 g, 15 g, Oral, PRN, [place]ie [place], APRN - CNP    dextrose 50 % IV solution, 12.5 g, Intravenous, PRN, [place]ie [place], APRN - CNP    glucagon (rDNA) injection 1 mg, 1 mg, Intramuscular, PRN, Rinku [place], APRN - CNP    dextrose 5 % solution, 100 mL/hr, Intravenous, PRN, Rinku [place], APRN - CNP    docusate sodium (COLACE) capsule 100 mg, 100 mg, Oral, BID, Rinku [place], APRN - CNP    Dexamethasone Sodium Phosphate injection 4 mg, 4 mg, Intravenous, Daily, PAMELA Manuel CNP, 4 mg at 06/28/21 1811    mannitol 20 % IVPB 50 g, 50 g, Intravenous, Q8H, Buck Nguyen, APRN - CNP, Last Rate: 500 mL/hr at 06/28/21 1814, 50 g at 06/28/21 1814  Unable to confirm at this moment, Except as available on EMR. SOCIAL HISTORY     Social History     Social History Narrative    Not on file     Social History     Tobacco Use    Smoking status: Former Smoker     Packs/day: 0.50     Types: Cigarettes    Smokeless tobacco: Never Used   Vaping Use    Vaping Use: Every day    Substances: Always   Substance Use Topics    Alcohol use: No    Drug use: No     Unable to confirm at this moment, Except as available on EMR. ALLERGIES     Allergies   Allergen Reactions    Bactrim [Sulfamethoxazole-Trimethoprim] Swelling     Of tongue    Demerol Rash     nausea    Pcn [Penicillins] Rash     Unable to confirm at this moment, Except as available on EMR. FAMILY HISTORY     Family History   Problem Relation Age of Onset    Cancer Sister 61        breast    Cancer Brother 79        lung    Cancer Brother 68        colon    Cancer Son 48        lung     Unable to confirm at this moment, Except as available on EMR. PREVIOUS RECORDS   Previous records reviewed: Medical, past surgical, medications, allergies. PHYSICAL EXAM     ED Triage Vitals   BP Temp Temp Source Pulse Resp SpO2 Height Weight   06/28/21 0858 06/28/21 0858 06/28/21 0858 06/28/21 0855 06/28/21 0855 06/28/21 0855 06/28/21 0858 06/28/21 0858   128/67 98 °F (36.7 °C) Oral 86 16 94 % 5' (1.524 m) 137 lb (62.1 kg)     Initial vital signs and nursing assessment reviewed and normal. Pulsoximetry is normal per my interpretation. Additional Vital Signs:  Vitals:    06/28/21 1800   BP: (!) 118/50   Pulse: 61   Resp: 17   Temp:    SpO2: 94%       EKG monitor: Normal sinus rhythm, no ectopy. Physical Exam  Constitutional:       Appearance: Normal appearance.    HENT:      Head:      Comments: Pain to the right temple, periorbital       Right Ear: External ear normal. There is impacted cerumen. Left Ear: External ear normal. There is impacted cerumen. Nose: Nose normal.      Mouth/Throat:      Mouth: Mucous membranes are moist.      Pharynx: Oropharynx is clear. Eyes:      Conjunctiva/sclera: Conjunctivae normal.      Comments: Right eye is prosthetic. Left eye nonreactive, 4 mm     Cardiovascular:      Rate and Rhythm: Normal rate and regular rhythm. Pulses: Normal pulses. Heart sounds: Normal heart sounds. No murmur heard. No gallop. Pulmonary:      Effort: Pulmonary effort is normal. No respiratory distress. Breath sounds: Normal breath sounds. No wheezing or rales. Chest:      Chest wall: No tenderness. Abdominal:      General: Abdomen is flat. There is no distension. Palpations: Abdomen is soft. Tenderness: There is no abdominal tenderness. There is no right CVA tenderness, left CVA tenderness, guarding or rebound. Musculoskeletal:         General: Normal range of motion. Cervical back: Normal range of motion and neck supple. Skin:     General: Skin is warm and dry. Capillary Refill: Capillary refill takes less than 2 seconds. Findings: Bruising present. Neurological:      General: No focal deficit present. Mental Status: She is alert and oriented to person, place, and time. Mental status is at baseline. Sensory: No sensory deficit. Motor: No weakness. FURTHER MEDICAL DECISION MAKING   Additional Assessment: 6year-old female, past medical history of metastatic breast cancer, presenting with fall. Physical exam significant for no reaction of left eye, right eye is prosthetic, bruising to right temple and periorbital unable to visualize TMs due to cerumen impaction. Further PLAN: labs, portable X rays, CT scan, admission,   CT head shows mass, hemorrhagic and necrotic with acute bleed. Midline shift noted. Patient to be admitted to trauma with neurosurgery consult.   Discussed results with patient and family        ED RESULTS   Laboratory results:  Labs Reviewed   CBC WITH AUTO DIFFERENTIAL - Abnormal; Notable for the following components:       Result Value    RBC 4.10 (*)     MCHC 30.9 (*)     RDW-SD 48.9 (*)     All other components within normal limits   TROPONIN - Abnormal; Notable for the following components:    Troponin T 0.011 (*)     All other components within normal limits   ANION GAP - Abnormal; Notable for the following components:    Anion Gap 6.0 (*)     All other components within normal limits   VRE SCREEN BY PCR   CULTURE, MRSA, SCREENING   COMPREHENSIVE METABOLIC PANEL W/ REFLEX TO MG FOR LOW K   BRAIN NATRIURETIC PEPTIDE   ETHANOL   URINE DRUG SCREEN   CK   GLOMERULAR FILTRATION RATE, ESTIMATED   OSMOLALITY   MRSA BY PCR   TEG, RAPID CITRATED   COMPREHENSIVE METABOLIC PANEL W/ REFLEX TO MG FOR LOW K   CBC   TEG, RAPID CITRATED   POCT GLUCOSE   POCT GLUCOSE   TYPE AND SCREEN   PREPARE PLATELETS    Narrative:     [id number]     issued       Radiologic studies results:  MRI BRAIN W WO CONTRAST   Final Result    Heterogeneous 5.5 cm nodular peripherally enhancing cystic and solid mass centered at the right occipital/temporal lobe junction with prominent adjacent T2/FLAIR prolongation involving the corpus callosum, temporal, parietal and frontal lobes with    prominent mass effect on the right lateral ventricle and 9 mm leftward midline shift. 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 **This report has been created using voice recognition software. It may contain minor errors which are inherent in voice recognition technology. **         Final report electronically signed by Dr. Roseanne Zeng MD on 6/28/2021 12:45 PM      CT HEAD WO CONTRAST   Final Result   Mixed density mass in the right parietal occipital lobe with extensive surrounding white matter edema and minimal parenchymal and subarachnoid hemorrhage.  There is midline shift 8 mm left to errors which are inherent in voice recognition technology. **      Final report electronically signed by Dr. Theodore Harding MD on 6/28/2021 10:00 AM      CT THORACIC RECONSTRUCTION WO POST PROCESS   Final Result   No acute abnormality of the thoracic spine. **This report has been created using voice recognition software. It may contain minor errors which are inherent in voice recognition technology. **      Final report electronically signed by Dr. Gordo Ladd on 6/28/2021 10:06 AM      CT FACIAL BONES WO CONTRAST   Final Result    No evidence of acute osseous injury of the face. **This report has been created using voice recognition software. It may contain minor errors which are inherent in voice recognition technology. **      Final report electronically signed by Dr. Theodore Harding MD on 6/28/2021 10:04 AM      US LIVER    (Results Pending)   CT HEAD WO CONTRAST    (Results Pending)       ED Medications administered this visit:   Medications   calcium-vitamin D (OSCAL-500) 500-200 MG-UNIT per tablet 1 tablet (has no administration in time range)   isosorbide mononitrate (IMDUR) extended release tablet 30 mg (30 mg Oral Given 6/28/21 1443)   levothyroxine (SYNTHROID) tablet 100 mcg (100 mcg Oral Given 6/28/21 1443)   cetirizine (ZYRTEC) tablet 10 mg (10 mg Oral Given 6/28/21 1443)   multivitamin 1 tablet (1 tablet Oral Given 6/28/21 1444)   pantoprazole (PROTONIX) tablet 40 mg (has no administration in time range)   sertraline (ZOLOFT) tablet 50 mg (has no administration in time range)   atorvastatin (LIPITOR) tablet 10 mg (has no administration in time range)   traZODone (DESYREL) tablet 50 mg (has no administration in time range)   sodium chloride flush 0.9 % injection 5-40 mL (has no administration in time range)   sodium chloride flush 0.9 % injection 5-40 mL (has no administration in time range)   0.9 % sodium chloride infusion (has no administration in time range) ondansetron (ZOFRAN-ODT) disintegrating tablet 4 mg (has no administration in time range)     Or   ondansetron (ZOFRAN) injection 4 mg (has no administration in time range)   polyethylene glycol (GLYCOLAX) packet 17 g (17 g Oral Not Given 6/28/21 1437)   fleet rectal enema 1 enema (has no administration in time range)   0.9 % sodium chloride infusion ( Intravenous New Bag 6/28/21 1257)   acetaminophen (TYLENOL) tablet 650 mg (has no administration in time range)   lidocaine 4 % external patch 2 patch (2 patches Topical Not Given 6/28/21 1437)   levETIRAcetam (KEPPRA) 500 mg in sodium chloride 0.9 % 100 mL IVPB (has no administration in time range)   tranexamic acid (CYKLOKAPRON) 1,000 mg in sodium chloride 0.9 % 100 mL IVPB (0 mg Intravenous Stopped 6/28/21 1254)     Followed by   tranexamic acid (CYKLOKAPRON) 1,000 mg in sodium chloride 0.9 % 100 mL IVPB (1,000 mg Intravenous New Bag 6/28/21 1301)   0.9 % sodium chloride infusion (has no administration in time range)   insulin lispro (HUMALOG) injection vial 0-6 Units (0 Units Subcutaneous Not Given 6/28/21 1744)   insulin lispro (HUMALOG) injection vial 0-3 Units (has no administration in time range)   glucose (GLUTOSE) 40 % oral gel 15 g (has no administration in time range)   dextrose 50 % IV solution (has no administration in time range)   glucagon (rDNA) injection 1 mg (has no administration in time range)   dextrose 5 % solution (has no administration in time range)   docusate sodium (COLACE) capsule 100 mg (100 mg Oral Not Given 6/28/21 1436)   Dexamethasone Sodium Phosphate injection 4 mg (4 mg Intravenous Given 6/28/21 1811)   mannitol 20 % IVPB 50 g (50 g Intravenous New Bag 6/28/21 1814)   iopamidol (ISOVUE-370) 76 % injection 80 mL (80 mLs Intravenous Given 6/28/21 1655)   levETIRAcetam (KEPPRA) 1,000 mg in sodium chloride 0.9 % 100 mL IVPB (0 mg Intravenous Stopped 6/28/21 1156)   gadoteridol (PROHANCE) injection 15 mL (15 mLs Intravenous Given 6/28/21 1231)           POCUS eFAST   Date/Time: 06/28/21, 0915   Performed by: Faheem Duncan   Indications: blunt trauma   Views obtained:     Bentley's Pouch:  Visualized     Splenorenal recess:  Visualized     Subxyphoid:  Visualized     Suprapubic:  Visualized     Right lung trauma-pneumothorax protocol:  Visualized     Left lung trauma-pneumothorax protocol:  Visualized   Findings:    Bentley's Pouch: Intra-abdominal fluid: not identified      Splenorenal recess findings:      Intra-abdominal fluid: not identified      Subxyphoid:      Intra-pericardial fluid: not identified      Suprapubic findings:      Intra-abdominal fluid: not identified        Final impression:   eFAST scan not positive for free fluid or pneumothorax at [address] [place] [address]      ED COURSE            MEDICATION CHANGES     Current Discharge Medication List            FINAL DISPOSITION     Final diagnoses:   Fall, initial encounter   Closed head injury, initial encounter   Intraparenchymal hemorrhage of brain ([facility] [address]   Brain mass     Condition: condition: stable  Dispo: Admit to telemetry      This transcription was electronically signed. It was dictated by use of voice recognition software and electronically transcribed. The transcription may contain errors not detected in proofreading.      Mary Kay Lewis MD  Resident  06/28/21 2389

## 2021-06-28 NOTE — PROGRESS NOTES
[facility] THERAPY  [facility] ICU 4D  Speech  Language  Cognitive Evaluation + Clinical Swallow Evaluation + Cognitive Therapy    SLP Individual Minutes  Time In: 2540  Time Out: 4805  Minutes: 38  Timed Code Treatment Minutes: 8 Minutes   Clinical swallow evaluation: 10 minutes  Speech, language, cognitive evaluation: 20 minutes  Cognitive therapy: 8 minutes       Date: 2021  Patient Name: Sidra Childers      CSN: [id number]   : 1933  (80 y.o.)  Gender: female   Referring Physician:  PAMELA Mullen CNP  Diagnosis: Fall at home, initial encounter  Secondary Diagnosis: Dysphagia, cognitive deficits  Precautions: Fall risk, aspiration precautions, low stimulation guidelines/protocol  History of Present Illness/Injury: Pt admitted to [facility] with above med dx; please refer to physician H&P for full details. Per chart review, \"80year-old frail white female reformed smoker. She had her right eye removed in 2017. She has remote cholecystectomy in  and a remote partial hysterectomy. Patient has a history of hypothyroidism, GERD and degenerative arthritis. She has a history of left subclavian artery stenosis and left vertebral artery occlusion. Patient has temporal arteritis with right-sided blindness. She has a history of breast cancer diagnosed in 2016 with an isolated liver lesion. Patient has a history of recurrent falls. Patient sustained a fall early this morning while trying to go to the bathroom. She states that her legs gave out when she was trying to ambulate and she fell to the ground. She apparently hit her head on the nightstand injuring her right eye. There was no loss of consciousness or seizure activity. She was too weak to bear her own weight. When home health arrived they found her on the floor at 8 AM.  He was transferred to the emergency room where she underwent a thorough work-up. She had an abnormal CT scan which precipitated MRI head. Findings were consistent with a right sided brain mass consistent with glioblastoma. As patient had small subarachnoid hemorrhage, she was administered TXA. Blood pressure parameters were maintained systolic blood pressure between 101 160. Neurosurgery was consulted. \" ST consulted to complete clinical swallow evaluation and to assess cognitive linguistic domains s/t acute intracranial findings to assist with POC development as appropriate. Past Medical History:   Diagnosis Date    Anxiety     Blind right eye     Breast cancer metastasized to liver ([facility] [address] 05/10/2016    Liver lesion noted     Carotid stenosis      Left ICA 25%    Colostomy in place [facility]) 05/27/2016    Degenerative arthritis of cervical spine 5/07    GERD (gastroesophageal reflux disease) 11/06    Hypercholesteremia     Hypoestrogenism     Hypothyroidism     Lumbago     Lumbosacral spinal stenosis 05/2019    MDRO (multiple drug resistant organisms) resistance 2008    pt stated cleared    Personal history of cardiac dysrhythmia     Sigmoid diverticulosis 8/04    Spondylolisthesis of lumbosacral region 8/04    Steroid-induced hyperglycemia     Subclavian artery stenosis (HCC)     left    Superior and Inferior Pubic ramus fracture, right, closed, initial encounter ([facility] [address] 05/21/2019    SVT (supraventricular tachycardia) ([facility] [address] [facility] 6/07    Temporal arteritis ([facility] [address] [facility]     Vertebral artery occlusion     left    Vitamin D deficiency 06/2017       Pain: 8/10 - Pain location: headache, worsening in severity as evaluation progressed; RN Emanuel present and aware of concerns    Subjective:  Pt resting in bed upon arrival, awake and alert; slow processing speed noted. Positive participation throughout evaluations. Niece present at bedside to provide intermittent support and encouragement.      SOCIAL HISTORY:   Living Arrangements: [place], independently; x2 nieces in the immediate area to provide supplemental assistance should be required; [place] aide Monday-Friday for x2 hours/day   Work History: Retired; [employer] aide   Education Level: 9th grade   Driving Status: Does not drive  Finance Management: Assistance Required  Medication Management: Assistance Required  ADL's: Assistance Required. Hobbies: Light house cleaning  Vision Status: Limited visual acuity (no vision R eye, blurry vision L eye); concerns for L neglect   Hearing: WFL     SPEECH / VOICE:  Speech and Voice appear to be grossly intact for basic and complex daily communication    LANGUAGE:  Receptive:  1 Step Commands: 3/3  2 Step Commands: 2/2  Simple Yes/No Questions: 3/3  Complex Yes/No Questions: 2/2    Expressive:  Automatic Speech: WFL numerical counting 1-10  Sentence Completion (open-ended): 3/3  Confrontational Naming: 3/3  Responsive Namin/2  Sentence Formulation: Intact for basic wants/needs  Conversational Speech: Impaired thought organization   Paraphasias: None evidenced  Repetition: 1/2 sentence level *deficits for recall    COGNITION:  Informal cognitive screener (A) administered with score of 9/30 calculated with indications for a severe impairment level per skilled clinical findings.   Orientation: 4/6  Immediate Recall: 4/5  Short-Term Recall: 0/5  Divergent Naming: 3 items named in 1 minute time frame (target=11)  Problem Solvin/3  Reasonin/2 verbal  Sequencin/1  Thought Organization: Impaired  Insight: Limited  Attention: Adequate sustained attention   Math Computation: 0/1 serial subtraction   Executive Functioning: DNT    SWALLOWING:    Respiratory Status: Independent      Behavioral Observation: Alert and Oriented    ORAL MECHANISM EVALUATION:      Facial / Labial Impaired R periorbital and upper quadrant ecchymosis    Decreased tone   Lingual Impaired Reduced strength and ROM   Dentition WFL Full dentures   Velum WFL    Vocal Quality WFL    Sensation WFL    Cough Not Tested      PATIENT WAS EVALUATED USING:  Thin H20 via cup/straw; mixed/soft solids; hard/coarse solids    ORAL PHASE:  Impaired:  Impaired Mastication and Reduced Bolus Formation    PHARYNGEAL PHASE:  WFL:  Pharyngeal phase appears WFL but cannot rule out pharyngeal phase deficits from a bedside swallowing evaluation alone. SIGNS AND SYMPTOMS OF LARYNGEAL PENETRATION / ASPIRATION:  No signs/symptoms of aspiration evident in this evaluation, but cannot rule out silent aspiration. INSTRUMENTAL EVALUATION: Instrumental evaluation not indicated at this time. DIET RECOMMENDATIONS:  Soft and bite size, thin liquids    STRATEGIES: Full Upright Position, Small Bite/Sip, Pulmonary Monitoring, Oral Care after all Meals, Direct 1:1 Supervision, Alternate Solids and Liquids, Limit Distractions and Monitor for Fatigue     RECOMMENDATIONS/ASSESSMENT:  DIAGNOSTIC IMPRESSIONS:  Pt presents with at least mild oral dysphagia with inability to fully r/o potential presence of pharyngeal phase deficits without supportive imaging. Oral phase slightly prolonged with difficulties exhibited for textural breakdown and cohesive bolus formation resulting in presence of min-mod oral residuals spread diffusely throughout oral cavity with hard/coarse offerings completed; improved oral phase skills within consumption of mixed/soft solids. Thin H20 via cup and straw consumed with what appeared to be adequate control/containment of fluid bolus. No overt s/s aspiration exhibited across all consistencies/trials consumed, certainly not able to r/o totality of airway invasion events and/or pharyngeal dysfunction at bedside alone; pt's swallow physiology appears functional to support goal for comfortable oral intake without distress. Recommendations for soft and bite size diet with thin liquids, direct 1:1 assistance for PO intake given challenges to self-feeding skills s/t visual acuity deficits presenting.    *YADY Castellanos notified of recommendations post evaluation    As it r/t cognition, pt presents with a severe deficit given impaired temporal orientation, immediate/delayed recall, working memory, problem solving, sequencing, verbal/visual reasoning, math computation, thought organization, executive functioning, processing speed, and mental flexibility. Concerns for potential onset of left neglect given difficulties with visual tracking and locating within the left visual field. Expressive and receptive language measures grossly intact with no apparent communicative breakdowns at date; will require peripheral/dynamic monitoring. Skilled ST services are recommended to address the above aforementioned impairments to improve safe participation within current and future settings. *Anticipate increased need for supervision in home setting (24/7) with f/u conversation completed with niece    COGNITIVE THERAPY: Following completion of the cognitive evaluation, transitioned to skilled service provision via education. Education provided to pt and niece re: rationale for ST services as well as low stimulation guidelines given concerns for CHI. Encouragement provided to pt via visual handout re: needs for omitting extraneous conversation/noises (keeping room door closed), keeping lights dimmed, limitations to visitors (no more than x2 visitors), negating screen time (television, phone, computer), and building in rest-breaks throughout the day given worsening of headache severity and concussion-like symptoms as evaluation progressed; concerns for overall cognitive endruance. Receptiveness noted with ongoing education to be provided.      Rehabilitation Potential: good    EDUCATION:  Learner: Patient and Family  Education:  Reviewed results and recommendations of this evaluation, Reviewed diet and strategies, Reviewed signs, symptoms and risks of aspiration, Reviewed ST goals and Plan of Care and Reviewed recommendations for follow-up  Evaluation of Education: Verbalizes understanding, Demonstrates with assistance and Needs further instruction    PLAN:  Skilled SLP intervention on acute care 3-5 x per week or until goals met and/or pt plateaus in function. Specific interventions for next session may include: cognitive tasks, dysphagia management. PATIENT GOAL:    Did not state. Will further assess during treatment. SHORT TERM GOALS:  Short-term Goals  Timeframe for Short-term Goals: 2 weeks  Goal 1: Pt will safely consume soft and bite size diet wtih thin liquids (advanced texture trials as appropriate; direct 1:1 assistance) without overt s/s aspiration or pulmonary compromise to assist with nutrition/hydration measures. Goal 2: Pt will complete functional immediate/delayed recall tasks (inclusion of compensatory strategies) with 60% accuracy, mod cues to improve retention of pertinent information. Goal 3: Pt will complete problem solving, verbal/visual reasoning, and executive functioning tasks (time, basic money/math/medications) with 60% accuracy, mod cues to improve contribution within ADLs/IADLs. Goal 4: Pt will complete sequencing and thought organization tasks with 70% accuracy, mod cues to improve mental flexibility and processing speed. Goal 5: Pt will complete selective attention tasks (focus on the left) with no more than 5 errors until task completion to optimize reading comprehension and visual scanning requried for ADLs. Goal 6: Pt and family will exhibit return demonstration for implementation of low stimulation guidelines/protocol given concerns within acute intracranial findings.     LONG TERM GOALS:  No LTG established given short MANJIT Dougherty M.A., [address] [place] [address]

## 2021-06-28 NOTE — ED NOTES
Dr. Lilia Anguiano in to speak with pt and family at this time about CT results.      Miya Akbar RN  06/28/21 [id number]

## 2021-06-28 NOTE — ED NOTES
Pt went to the bathroom around 0500 this morning. She states when she was walking back to her bed, she fell to the ground. She is unsure if she hit her nightstand or not, but her lamp fell down and hit her just above the right eye. Pt denies any loc. Home health aide found her on the ground around 0800. Pt states she was too weak to get up on her own. It is reported by ems that they have been to her home multiple times for a lift assist after falling, but pt typically refuses to go to the ER. Pt states she has been falling a lot more lately. She feels as if her legs get very weak and if she doesn't have time to sit down, she falls. Upon assessment, pt alert and oriented. Multiple bruises in different healing stages noted on pt body. New bruises noted to pt left shin, left posterior forearm and just above pt right eye. Pt states she is \"sore all over\" to Dr. Ashleigh Yeboah, but when asked to rate her pain on a pain scale, pt states \"i'm not in any pain\". Upon palpation by Danette Caraballo, Trauma CNP, pt also complains of some lower thoracic pain. Pt hooked up to monitor. Trauma team at bedside.         Mirian Guerrero RN  06/28/21 1000

## 2021-06-28 NOTE — PROGRESS NOTES
Neurosurgery interim progress note    The patient is seen and evaluated in the emergency department setting with evaluation exam findings reviewed and discussed with Dr. Laura Diaz and with nursing and ED and trauma. Pertinent images and labs have been reviewed. Patient is resting comfortably in the emergency department sitting with the head of the bed mildly elevated. She lives independently and is accompanied by her niece. The patient relates a mechanical fall this morning with noted ecchymosis about the right orbit. She denies any loss of consciousness but does relate an increase in frequency of falls over the last month. On exam she remains alert and oriented x3 and grossly intact neurologically for strength and sensation bilaterally and symmetrically with the exception of some diffuse lower extremity weakness (chronic). She is negative for pronator drift visual disturbances or significant balance. Patient does have a history for metastatic breast cancer to liver and pancreas and is currently on Plavix following cardiac catheterization. Patient status is currently DNR CCA. Note: Question was initiated with the family and the patient regarding their wishes specific to aggressive intervention. Undetermined. Current treatment plan from a neurosurgical perspective:    Admit per trauma. Discontinue any and all anticoagulant medications. Administer TXA. Administer Keppra. Order and administer platelets. MRI brain without contrast for review with a repeat of the CT scan in the morning for comparison. A detailed neurosurgery note will follow.

## 2021-06-28 NOTE — H&P
course        Activation: []Level I (Trauma Alert) [x]Level II (Injury Call) []Level III (Trauma Consult) []Downgraded  Mode of Arrival: EMS transportation  Referring Facility: N/A   Loss of Consciousness [x]No []Yes[]Unknown   Mechanism of Injury:  []Motor Vehicle crash   []Single Vehicle [] []Passenger []Scene Fatality []Front Seat  []Restrained   []Air Bag Deployed   []Ejected []Rollover []Pedestrian []Trapped   Type of vehicle:   Protective Devices:   []Motorcycle  Wearing Helmet []Yes []No  []Bicycle  Wearing Helmet []Yes []No  [x]Fall   Distance - ground level    []Assault    Abuse Reported []Yes []No  []Gunshot  []Stabbing  []Work Related  []Burn: []Flame []Scald []Electrical []Chemical []Contact []Inhalation []House Fire  []Other:   Patient Active Problem List   Diagnosis    GERD (gastroesophageal reflux disease)    Carotid stenosis    Hypercholesteremia    Hypothyroidism due to acquired atrophy of thyroid    Steroid-induced hyperglycemia    Blind right eye    Current chronic use of systemic steroids    Personal history of cardiac dysrhythmia    Coronary artery disease involving native coronary artery of native heart without angina pectoris    Anxiety    H/O: CVA (cerebrovascular accident)    SBO (small bowel obstruction) (HCC)    Lumbosacral spinal stenosis    Vitamin D deficiency    Abdominal pain    Abdominal pain, vomiting, and diarrhea    Hx of breast cancer with liver mets    Breast cancer metastasized to liver [facility])    Fall    Traumatic ecchymosis of forehead    Right parietal lobe mass    Subarachnoid hemorrhage following injury, no loss of consciousness (HCC)    Traumatic ecchymosis of left lower leg    Traumatic ecchymosis of left forearm    Platelet inhibition due to Plavix     Subjective   Chief Complaint: Fall    History of Present Illness:  Ellen Pacheco is an 80year old female presenting to the Emergency Department via EMS for evaluation of potential injuries sustained dysuria, flank pain, frequency, hematuria and urgency. Musculoskeletal: Positive for back pain and gait problem. Negative for arthralgias, joint swelling, myalgias, neck pain and neck stiffness. Skin: Negative for color change, pallor, rash and wound. Allergic/Immunologic: Negative for environmental allergies, food allergies and immunocompromised state. Neurological: Positive for weakness. Negative for dizziness, tremors, seizures, syncope, facial asymmetry, speech difficulty, light-headedness, numbness and headaches. Hematological: Negative for adenopathy. Bruises/bleeds easily. Psychiatric/Behavioral: Negative for agitation, behavioral problems, confusion, decreased concentration, dysphoric mood, hallucinations, self-injury, sleep disturbance and suicidal ideas. The patient is not nervous/anxious and is not hyperactive.         Bactrim [sulfamethoxazole-trimethoprim], Demerol, and Pcn [penicillins]  Past Surgical History:   Procedure Laterality Date    CARDIAC CATHETERIZATION  5/07    CATARACT REMOVAL  right 1/08; left 2/08    CHOLECYSTECTOMY  1/03    COLONOSCOPY  11/06    COLOSTOMY  09/04/2018    REVERSAL OF COLOSTOMY    DILATION AND CURETTAGE OF UTERUS      ENDOSCOPY, COLON, DIAGNOSTIC      EYE REMOVAL Right 03/2017    EYE SURGERY      EYE SURGERY Right 11/06/2017    Dr Martita Che in [address]      partial    OTHER SURGICAL HISTORY  05/27/2016    robotic assisted laparoscopic anterior colon resection and end colostomy    NH OFFICE/OUTPT VISIT,PROCEDURE ONLY N/A 9/4/2018    COLOSTOMY REVERSAL AND PARASTOMAL HERNIA REPAIR performed by Farida Jackson MD at [address] [facility]  2002    UPPER GASTROINTESTINAL ENDOSCOPY  11/06     Past Medical History:   Diagnosis Date    Anxiety     Blind right eye     Breast cancer metastasized to liver ([facility] [address] 05/10/2016    Liver lesion noted     Carotid stenosis      Left ICA 25%    Colostomy in place ([facility] [address] 05/27/2016    Degenerative arthritis of cervical spine 5/07    GERD (gastroesophageal reflux disease) 11/06    Hypercholesteremia     Hypoestrogenism     Hypothyroidism     Lumbago     Lumbosacral spinal stenosis 05/2019    MDRO (multiple drug resistant organisms) resistance 2008    pt stated cleared    Personal history of cardiac dysrhythmia     Sigmoid diverticulosis 8/04    Spondylolisthesis of lumbosacral region 8/04    Steroid-induced hyperglycemia     Subclavian artery stenosis (HCC)     left    Superior and Inferior Pubic ramus fracture, right, closed, initial encounter ([facility] [address] 05/21/2019    SVT (supraventricular tachycardia) ([facility] [address] 6/07    Temporal arteritis ([facility] [address]     Vertebral artery occlusion     left    Vitamin D deficiency 06/2017     Past Surgical History:   Procedure Laterality Date    CARDIAC CATHETERIZATION  5/07    CATARACT REMOVAL  right 1/08; left 2/08    CHOLECYSTECTOMY  1/03    COLONOSCOPY  11/06    COLOSTOMY  09/04/2018    REVERSAL OF COLOSTOMY    DILATION AND CURETTAGE OF UTERUS      ENDOSCOPY, COLON, DIAGNOSTIC      EYE REMOVAL Right 03/2017    EYE SURGERY      EYE SURGERY Right 11/06/2017    Dr Viktoria Kimbrough in [address]      partial    OTHER SURGICAL HISTORY  05/27/2016    robotic assisted laparoscopic anterior colon resection and end colostomy    [place] OFFICE/OUTPT VISIT,PROCEDURE ONLY N/A 9/4/2018    COLOSTOMY REVERSAL AND PARASTOMAL HERNIA REPAIR performed by Brijesh Chavez MD at [address] [facility]  2002    UPPER GASTROINTESTINAL ENDOSCOPY  11/06     Social History     Socioeconomic History    Marital status:       Spouse name: Not on file    Number of children: 0    Years of education: 10th grade     Highest education level: Not on file   Occupational History    Not on file   Tobacco Use    Smoking status: Former Smoker     Packs/day: 0.50     Types: Cigarettes    Smokeless tobacco: Never Used   Vaping Use    Vaping Use: Every day    Substances: Always   Substance and Sexual Activity    Alcohol use: No    Drug use: No    Sexual activity: Never   Other Topics Concern    Not on file   Social History Narrative    Not on file     Social Determinants of Health     Financial Resource Strain: Low Risk     Difficulty of Paying Living Expenses: Not hard at all   Food Insecurity: No Food Insecurity    Worried About Running Out of Food in the Last Year: Never true    Florian of Food in the Last Year: Never true   Transportation Needs:     Lack of Transportation (Medical):  Lack of Transportation (Non-Medical):    Physical Activity:     Days of Exercise per Week:     Minutes of Exercise per Session:    Stress:     Feeling of Stress :    Social Connections:     Frequency of Communication with Friends and Family:     Frequency of Social Gatherings with Friends and Family:     Attends Congregational Services:     Active Member of Clubs or Organizations:     Attends Club or Organization Meetings:     Marital Status:    Intimate Partner Violence:     Fear of Current or Ex-Partner:     Emotionally Abused:     Physically Abused:     Sexually Abused:      Family History   Problem Relation Age of Onset    Cancer Sister 61        breast    Cancer Brother 79        lung    Cancer Brother 68        colon    Cancer Son 48        lung       Home medications:    Previous Medications    ACETAMINOPHEN (TYLENOL) 500 MG TABLET    Take 1,000 mg by mouth every 8 hours as needed for Pain     BIOFLAVONOID PRODUCTS (RA VITAMIN C CR) TBCR    as directed    BLOOD GLUCOSE TEST STRIPS (ASCENSIA AUTODISC VI;ONE TOUCH ULTRA TEST VI) STRIP    1 each by In Vitro route daily Test BS daily.  E11.9    CALCIUM CARBONATE-VITAMIN D (OYSTER SHELL CALCIUM/D) 500-200 MG-UNIT TABS    take 1 tablet by mouth twice a day    CLOPIDOGREL (PLAVIX) 75 MG TABLET    take 1 tablet by mouth once daily    ISOSORBIDE MONONITRATE (IMDUR) 30 MG EXTENDED RELEASE TABLET    take 1 tablet by mouth once daily    LEVOTHYROXINE (SYNTHROID) 100 MCG TABLET    take 1 tablet by mouth once daily    LORATADINE (CLARITIN) 10 MG TABLET    take 1 tablet by mouth once daily    MULTIPLE VITAMINS-MINERALS (CENTRUM SILVER ADULT 50+) TABS    Take 1 tablet by mouth daily    NUTRITIONAL SUPPLEMENTS (ENSURE ORIGINAL) LIQD    Take 1 Can by mouth 2 times daily    PANTOPRAZOLE (PROTONIX) 40 MG TABLET    take 1 tablet by mouth once daily    PREDNISONE (DELTASONE) 1 MG TABLET    Take 3 mg by mouth daily     PROMETHAZINE (PHENERGAN) 25 MG TABLET    Take 1 tablet by mouth every 6 hours as needed for Nausea (and vomiting)    RA ASPIRIN EC ADULT LOW ST 81 MG EC TABLET    take 1 tablet by mouth once daily    RA BIOTIN 1000 MCG TABS    use as directed    SERTRALINE (ZOLOFT) 50 MG TABLET    take 1 tablet by mouth twice a day    SIMVASTATIN (ZOCOR) 20 MG TABLET    take 1 tablet by mouth every evening    TRAZODONE (DESYREL) 50 MG TABLET    take 1 tablet by mouth at bedtime if needed for sleep    VITAMIN D (ERGOCALCIFEROL) 1.25 MG (02582 UT) CAPS CAPSULE    take 1 capsule by mouth every week       Hospital medications:  Scheduled Meds:  Continuous Infusions:  PRN Meds:  Objective   ED TRIAGE VITALS  BP: (!) 152/57, Temp: 98 °F (36.7 °C), Pulse: 62, Resp: 21, SpO2: 94 %  Breanne Coma Scale  Eye Opening: Spontaneous  Best Verbal Response: Oriented  Best Motor Response: Obeys commands  [place] Coma Scale Score: 15  Results for orders placed or performed during the hospital encounter of 06/28/21   CBC Auto Differential   Result Value Ref Range    WBC 8.5 4.8 - 10.8 thou/mm3    RBC 4.10 (L) 4.20 - 5.40 mill/mm3    Hemoglobin 12.1 12.0 - 16.0 gm/dl    Hematocrit 39.1 37.0 - 47.0 %    MCV 95.4 81.0 - 99.0 fL    MCH 29.5 26.0 - 33.0 pg    MCHC 30.9 (L) 32.2 - 35.5 gm/dl    RDW-CV 13.9 11.5 - 14.5 %    RDW-SD 48.9 (H) 35.0 - 45.0 fL    Platelets 589 539 - 566 thou/mm3    MPV 11.4 9.4 - 12.4 fL    Seg Neutrophils 66.6 %    Lymphocytes 24.2 % Monocytes 7.4 %    Eosinophils 1.3 %    Basophils 0.1 %    Immature Granulocytes 0.4 %    Segs Absolute 5.7 1 - 7 thou/mm3    Lymphocytes Absolute 2.1 1.0 - 4.8 thou/mm3    Monocytes Absolute 0.6 0.4 - 1.3 thou/mm3    Eosinophils Absolute 0.1 0.0 - 0.4 thou/mm3    Basophils Absolute 0.0 0.0 - 0.1 thou/mm3    Immature Grans (Abs) 0.03 0.00 - 0.07 thou/mm3    nRBC 0 /100 wbc   Comprehensive Metabolic Panel w/ Reflex to MG   Result Value Ref Range    Glucose 81 70 - 108 mg/dL    CREATININE 0.6 0.4 - 1.2 mg/dL    BUN 19 7 - 22 mg/dL    Sodium 138 135 - 145 meq/L    Potassium reflex Magnesium 4.1 3.5 - 5.2 meq/L    Chloride 104 98 - 111 meq/L    CO2 28 23 - 33 meq/L    Calcium 9.2 8.5 - 10.5 mg/dL    AST 28 5 - 40 U/L    Alkaline Phosphatase 39 38 - 126 U/L    Total Protein 6.2 6.1 - 8.0 g/dL    Albumin 4.1 3.5 - 5.1 g/dL    Total Bilirubin 0.5 0.3 - 1.2 mg/dL    ALT 19 11 - 66 U/L   Troponin   Result Value Ref Range    Troponin T 0.011 (A) ng/ml   Brain Natriuretic Peptide   Result Value Ref Range    Pro-BNP 1346.0 0.0 - 1800.0 pg/mL   Ethanol   Result Value Ref Range    ETHYL ALCOHOL, SERUM < 0.01 0.00 %   CK   Result Value Ref Range    Total CK 70 30 - 135 U/L   Anion Gap   Result Value Ref Range    Anion Gap 6.0 (L) 8.0 - 16.0 meq/L   Glomerular Filtration Rate, Estimated   Result Value Ref Range    Est, Glom Filt Rate >90 ml/min/1.73m2   Osmolality   Result Value Ref Range    Osmolality Calc 277.0 275.0 - 300.0 mOsmol/kg   EKG 12 Lead   Result Value Ref Range    Ventricular Rate 62 BPM    Atrial Rate 62 BPM    P-R Interval 154 ms    QRS Duration 80 ms    Q-T Interval 434 ms    QTc Calculation (Bazett) 440 ms    P Axis 86 degrees    R Axis 9 degrees    T Axis 161 degrees       Physical Exam:  Patient Vitals for the past 24 hrs:   BP Temp Temp src Pulse Resp SpO2 Height Weight   06/28/21 1045 (!) 152/57   62 21 94 %     06/28/21 1030 (!) 149/65   62 17 94 %     06/28/21 1015 (!) 167/60   62 16 95 %   06/28/21 1000 (!) 146/62   62 16 95 %     06/28/21 0935 (!) 152/62   63 18 94 %     06/28/21 0920 (!) 143/57   66 14 94 %     06/28/21 0858 128/67 98 °F (36.7 °C) Oral 62 19 94 % 5' (1.524 m) 137 lb (62.1 kg)   06/28/21 0855    86 16 94 %       Primary Assessment:  Airway: Patent, trachea midline  Breathing: Breath sounds present and equal bilaterally, spontaneous, and unlabored  Circulation: Hemodynamically stable, 2+ central and peripheral pulses. Disability: JACKSON x 4, following commands. GCS =15    Secondary Assessment:  General: Alert, NAD. Head: Normocephalic, mid face stable. Tympanic membranes intact. Nares patent bilaterally, no epistaxis. Mouth clear of foreign bodies, no lacerations or abrasions. Ecchymosis to right side of forehead. Eyes: PERRLA. EOMI. Ecchymosis just above right eye  Neurologic: A & O x3. Following commands. CN 2-12 intact  Neck: Immobilized in cervical collar, trachea midline. Cervical spines NTTP midline, without step-offs, crepitus or deformity. Back:T spines are NTTP midline, without step-offs, crepitus or deformity. Lumbar spines are tender to palpation but without step-offs, crepitus or deformity. Multiple areas of ecchymosis to back, in various stages of healing. Lungs: Clear to auscultation bilaterally. Chest Wall: Chest rise symmetrical.  Chest wall without tenderness to palpation. No crepitus, deformities, lacerations, or abrasions. Heart: RRR. Normal S1/S2. No obvious M/G/R. Abdomen:  Soft, NTTP. No guarding. Non-peritoneal.  Pelvis:  NTTP, stable to compression. Femoral pulses 2+. GI/: No blood at the urinary meatus. No gross hematuria. Extremities: No gross deformities. PMS intact. Radial /DP/PT pulses 2+ bilaterally. Ecchymosis to the anterior lower left leg and the left forearm. Skin: Skin warm and dry. Normal for ethnicity.       Radiology:     CT HEAD WO CONTRAST   Final Result   Mixed density mass in the right parietal occipital lobe with extensive surrounding white matter edema and minimal parenchymal and subarachnoid hemorrhage. There is midline shift 8 mm left to right. Compression of the right lateral ventricle. Minimal mass effect on the basilar cistern. This was called to Dr. Tran Diaz at time of exam.            **This report has been created using voice recognition software. It may contain minor errors which are inherent in voice recognition technology. **      Final report electronically signed by Dr. Anneliese Lawson on 6/28/2021 9:56 AM      CT CERVICAL SPINE WO CONTRAST   Final Result    No evidence of acute osseous injury of the cervical spine. **This report has been created using voice recognition software. It may contain minor errors which are inherent in voice recognition technology. **      Final report electronically signed by Dr. Devyn Lozoya MD on 6/28/2021 9:54 AM      CT CHEST W CONTRAST   Final Result   1. No acute traumatic intrathoracic findings. **This report has been created using voice recognition software. It may contain minor errors which are inherent in voice recognition technology. **      Final report electronically signed by Dr. Martha Barrow on 6/28/2021 10:00 AM      CT ABDOMEN PELVIS W IV CONTRAST Additional Contrast? None   Final Result   Multiple chronic findings. No acute abdominal or pelvic abnormality. **This report has been created using voice recognition software. It may contain minor errors which are inherent in voice recognition technology. **      Final report electronically signed by Dr. Anneliese Lawson on 6/28/2021 10:03 AM      CT LUMBAR RECONSTRUCTION WO POST PROCESS   Final Result       1. No evidence of acute osseous injury of the lumbar spine. 2. 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 3. Multilevel degenerative changes elsewhere.  Please refer to the body of the report for segmental analysis. **This report has been created using voice recognition software. It may contain minor errors which are inherent in voice recognition technology. **      Final report electronically signed by Dr. Frank Jackson MD on 6/28/2021 10:00 AM      CT THORACIC RECONSTRUCTION WO POST PROCESS   Final Result   No acute abnormality of the thoracic spine. **This report has been created using voice recognition software. It may contain minor errors which are inherent in voice recognition technology. **      Final report electronically signed by Dr. Sarthak Nam on 6/28/2021 10:06 AM      CT FACIAL BONES WO CONTRAST   Final Result    No evidence of acute osseous injury of the face. **This report has been created using voice recognition software. It may contain minor errors which are inherent in voice recognition technology. **      Final report electronically signed by Dr. Frank Jackson MD on 6/28/2021 10:04 AM        Fast Exam: Yes - negative for free fluid    Electronically signed by PAMELA Braun - CNP on 6/28/2021 at 10:49 AM Patient seen and examined independently by me. Above discussed and I agree with CNP. Labs, cultures, and radiographs where available were reviewed. See orders for the updated patient care plan.     Sylvia Carreno MD MD, this was a level 2 trauma consult time called was 8:48 AM time seen was 9:44 AM trauma by fall on Plavix as noted above 66-year-old white female lives at home but does have aides that come into the day she apparently has been falling recently a lot EMS has been to her house to help getting her up but she is refused hospital transfer until today apparently somehow a lamp fell on her head hitting it work-up is revealed a mass in the brain that is likely causing her lower leg weakness along with some bleeding patient will be admitted to the ICU with critical care and neurosurgical consultation patient did sustain ecchymoses to some extremities and also to her right periorbital area  6/28/2021   9:55 PM

## 2021-06-28 NOTE — CONSULTS
Patient:  Yasmeen Castillo  MRN: [id number]   PCP: Zuhair Castro MD  Date of Admission: 6/28/2021    Assessment and Plan(All pulmonary edema, renal failure, PE, and respiratory failure diagnoses are acute in nature unless otherwise specified):        1. Fall: Associated with head trauma obvious injury to the right frontal region. Associated with small subarachnoid hemorrhage. 2. Subarachnoid hemorrhage: Traumatic in nature. TXA. Maintain systolic blood pressure between 101 160. Follow-up TEG. Repeat CT scan head in the morning. 3. Right occipital and parietal lobe brain mass: Suspicious for glioblastoma. Neurosurgery consulted. Seizure prophylaxis with Keppra. 4. Temporal arteritis: On steroids. 5. Hyperlipidemia: On atorvastatin. 6. Hypothyroidism: On Synthroid. 7. History of breast cancer: Reported lesion to the liver. Obtain ultrasound of the liver. No suspicious lesion identified on CT scan abdomen and pelvis. CC:  Brain Mass  HPI: Patient is an 69-year-old frail white female reformed smoker. She had her right eye removed in 2017. She has remote cholecystectomy in 2003 and a remote partial hysterectomy. Patient has a history of hypothyroidism, GERD and degenerative arthritis. She has a history of left subclavian artery stenosis and left vertebral artery occlusion. Patient has temporal arteritis with right-sided blindness. She has a history of breast cancer diagnosed in 2016 with an isolated liver lesion. Patient has a history of recurrent falls. Patient sustained a fall early this morning while trying to go to the bathroom. She states that her legs gave out when she was trying to ambulate and she fell to the ground. She apparently hit her head on the nightstand injuring her right eye. There was no loss of consciousness or seizure activity. She was too weak to bear her own weight.   When home health arrived they found her on the floor at 8 AM.  He was transferred to the emergency room where she underwent a thorough work-up. She had an abnormal CT scan which precipitated MRI head. Findings were consistent with a right sided brain mass consistent with glioblastoma. As patient had small subarachnoid hemorrhage, she was administered TXA. Blood pressure parameters were maintained systolic blood pressure between 101 160. Neurosurgery was consulted. For further details, please review the assessment and plan. ROS: Unsteady gait. Dizziness with standing. Slight headache. No double vision. Unable to see out of the right eye. Muscle tenderness and soreness. No loss of consciousness or seizure activity. Patient states that her legs felt weak and could no longer support her weight. No painful or difficulty swallowing. No left or right sided weakness. No painful or difficulty swallowing. No suicidal ideation. No chest pain. No fever. No shortness of breath. No rigors. All other review of systems are otherwise negative. PMH:  Per HPI  SHX: Previous smoker at half a pack of cigarettes a day. FHX: Sister had breast cancer.   Allergies: Bactrim and penicillin  Medications:     sodium chloride      sodium chloride 75 mL/hr at 06/28/21 1257    sodium chloride      dextrose        calcium-vitamin D  1 tablet Oral BID    isosorbide mononitrate  30 mg Oral Daily    levothyroxine  100 mcg Oral Daily    cetirizine  10 mg Oral Daily    multivitamin  1 tablet Oral Daily    [START ON 6/29/2021] pantoprazole  40 mg Oral QAM AC    predniSONE  3 mg Oral Daily    sertraline  50 mg Oral BID    atorvastatin  10 mg Oral Nightly    sodium chloride flush  5-40 mL Intravenous 2 times per day    polyethylene glycol  17 g Oral Daily    lidocaine  2 patch Topical Daily    levetiracetam  500 mg Intravenous Q12H    tranexamic acid (CYCLOKAPRON) IVPB  1,000 mg Intravenous Once    insulin lispro  0-6 Units Subcutaneous TID     insulin lispro  0-3 Units Subcutaneous Nightly    docusate sodium  100 mg Oral BID       Vital Signs:   T: 97.9: P: 63 RR: 17 B/P: 125/58: FiO2: RA: O2 Sat:88: I/O: Pending. GCS: 15  Body mass index is 26.48 kg/m². Maira Anderson General:   Elderly frail-appearing white female. HEENT:  normocephalic. No scleral icterus. PERR. Right eye swollen with hematoma. Right eye is swollen shut. Neck: supple. No Thyromegaly. Lungs: clear to auscultation. No retractions  Cardiac: RRR. No JVD. Abdomen: soft. Nontender. Nondistended. Extremities:  No clubbing, cyanosis, or edema x 4. Vasculature: capillary refill < 3 seconds. Palpable dorsalis pedis pulses. Skin:  warm and dry. Diminished turgor. Psych:  Alert and oriented x3. Affect appropriate  Lymph:  No supraclavicular adenopathy. Neurologic:  No focal deficit. No seizures. No upper extremity or lower extremity pronator drift. Patient has slight left facial droop. No dysarthria. No dysphasia. Data: (All radiographs, tracings, PFTs, and imaging are personally viewed and interpreted unless otherwise noted).  Telemetry shows sinus rhythm.  Sodium 138, potassium 4.1, chloride 104, bicarb 28, BUN 19, creatinine 0.6, glucose 81. CPK 70. Troponin 0 0.011. Urine drug screen negative. White blood cell count 8.5, hemoglobin 12.1, platelets 858.  CT scan head 6/28/2021: Complex mass in the right parietal and occipital lobe with edema and subsubarachnoid hemorrhage.  CT scan cervical spine 6/28/2021: No acute process.  CT scan abdomen and chest 6/28/2021: No mass.  CT scan lumbar spine 6/28/2021: Chronic pars defect at L5.  CT scan thoracic spine 6/28/2021: No acute abnormality.  MRI brain 6/28/2021: Complex 5.5 cm nodular cystic mass in the right occipital and temporal lobe region. Mass-effect on the right ventricle with a 9 mm leftward midline shift. Findings are concerning for glioblastoma. CC time 35 minutes. Electronically signed by Leslie Sam M.D.

## 2021-06-28 NOTE — ED NOTES
Pt transported to CT at this time in stable condition with this nurse.      Nayana Mendez RN  06/28/21 6057

## 2021-06-28 NOTE — CONSULTS
Sandra Leung[address]                                          NEUROSURGICAL CONSULTATION NOTE       Latanya Rod   YOB: 1933  Account Number: [de-identified]   Date of Examination: 6/28/2021    ASSESSMENT:     -This is an 45-year-old female with a newly diagnosed huge brain mass assoiciated with significant midline shift . Patient also has acute minimal parenchymal and subarachnoid hemorrhage secondary to ground level fall.  -Patient is confused and this oriented. -GCS 13 to 14/15.  -No new focal neurological deficit. PLAN:    -Admit to the ICU. -Medical management per ICU team.  -A TXA dose to be given to the patient. -Dexamethasone 4 mg every 6 IV. -Neuro check every 2 hours. -Mannitol 50 g every 8 hours.  -Seizure precaution.   -New brain CT tomorrow morning ([facility] protocol). -Stop anticoagulation medication at this time.  -Patient neurodiagnostic brain lesion is accessible from neurosurgical perspective however over patient age group review against any neurosurgical intervention at this time. However we will continue to discuss with patient and her family as well as with other treatment team members before finalize patient recommendation and needed treatment plan from the respiratory neurosurgical perspective.  -Neurosurgery will follow.  -The case was discussed in detail with patient and her family ER team and with the trauma team as well as the ICU team.    *Time spent was at least 35 min of critical time evaluating patient, discussion with staff, planning for treatment and evaluation. The time was spent examining patient face to face, reviewing the images personally, reviewing the chart, perform high complexity decision making and speaking with the nursing staff regarding recommendations.  There was a high probability of clinically significant life-threatening deterioration of the patient's condition requiring my urgent intervention. HISTORY OF PRESENT ILLNESS:  Henry Robertson is a 80 y.o. female, admitted on :6/28/2021  9:03 AM     This is a 49-year-old female who was brought initially to the ER for evaluation after patient experienced a ground-level fall. There is no obvious history of loss of consciousness or new focal weakness or numbness. However, per patient family she has been  experiencing progressive general weakness, headache, balance issue  and confusion over the last couple of weeks. Patient arrival to the ER she underwent brain CT that showed:    Mixed density mass in the right parietal occipital lobe with extensive surrounding white matter edema and minimal parenchymal and subarachnoid hemorrhage. There is midline shift 8 mm left to right. Compression of the right lateral ventricle. For this reason, neurosurgery team was consulted. ROS:    Review of Systems   Constitutional: Negative for fever. Eyes: Positive for visual disturbance. Respiratory: Negative for chest tightness. Cardiovascular: Negative for chest pain. Gastrointestinal: Negative for abdominal pain. Musculoskeletal: Positive for gait problem. Negative for back pain. Skin: Positive for wound. Neurological: Positive for weakness. Psychiatric/Behavioral: Positive for confusion.        PROBLEM LIST:  Patient Active Problem List   Diagnosis    GERD (gastroesophageal reflux disease)    Carotid stenosis    Hypercholesteremia    Hypothyroidism due to acquired atrophy of thyroid    Steroid-induced hyperglycemia    Blind right eye    Current chronic use of systemic steroids    Personal history of cardiac dysrhythmia    Coronary artery disease involving native coronary artery of native heart without angina pectoris    Anxiety    H/O: CVA (cerebrovascular accident)    SBO (small bowel obstruction) (HCC)    Lumbosacral spinal stenosis    Vitamin D deficiency    Abdominal pain    Abdominal pain, vomiting, and diarrhea  Hx of breast cancer with liver mets    Breast cancer metastasized to liver [facility])    Fall    Traumatic ecchymosis of forehead    Right parietal lobe mass    Subarachnoid hemorrhage following injury, no loss of consciousness (HCC)    Traumatic ecchymosis of left lower leg    Traumatic ecchymosis of left forearm    Platelet inhibition due to Plavix    Fall at home, initial encounter       MEDICATIONS:   Prior to Admission medications    Medication Sig Start Date End Date Taking? Authorizing Provider   traZODone (DESYREL) 50 MG tablet take 1 tablet by mouth at bedtime if needed for sleep 6/21/21   Imelda Harris MD   blood glucose test strips (ASCENSIA AUTODISC VI;ONE TOUCH ULTRA TEST VI) strip 1 each by In Vitro route daily Test BS daily.  E11.9 6/15/21   Rj Arauz MD   simvastatin (ZOCOR) 20 MG tablet take 1 tablet by mouth every evening 6/10/21   Rj Arauz MD   RA BIOTIN 1000 MCG TABS use as directed 2/22/21   Historical Provider, MD   loratadine (CLARITIN) 10 MG tablet take 1 tablet by mouth once daily 5/21/21   Obi Yin MD   Nutritional Supplements (ENSURE ORIGINAL) LIQD Take 1 Can by mouth 2 times daily 5/13/21   Rj Arauz MD   pantoprazole (PROTONIX) 40 MG tablet take 1 tablet by mouth once daily 4/20/21   Rj Arauz MD   sertraline (ZOLOFT) 50 MG tablet take 1 tablet by mouth twice a day 4/20/21   Rj Arauz MD   clopidogrel (PLAVIX) 75 MG tablet take 1 tablet by mouth once daily 3/29/21   Rj Arauz MD   isosorbide mononitrate (IMDUR) 30 MG extended release tablet take 1 tablet by mouth once daily 3/29/21   Rj Arauz MD   Calcium Carbonate-Vitamin D (OYSTER SHELL CALCIUM/D) 500-200 MG-UNIT TABS take 1 tablet by mouth twice a day 3/10/21   Rj Arauz MD   levothyroxine (SYNTHROID) 100 MCG tablet take 1 tablet by mouth once daily 2/12/21   Rj Arauz MD   RA ASPIRIN EC ADULT LOW ST 81 MG EC tablet take 1 tablet by chloride flush 0.9 % injection 5-40 mL  5-40 mL Intravenous 2 times per day Prashant Abler, APRN - CNP        sodium chloride flush 0.9 % injection 5-40 mL  5-40 mL Intravenous PRN Prashant Abler, APRN - CNP        0.9 % sodium chloride infusion  25 mL Intravenous PRN Prashant Abler, APRN - CNP        ondansetron (ZOFRAN-ODT) disintegrating tablet 4 mg  4 mg Oral Q8H PRN Prashant Abler, APRN - CNP        Or    ondansetron (ZOFRAN) injection 4 mg  4 mg Intravenous Q6H PRN Prashant Abler, APRN - CNP        polyethylene glycol (GLYCOLAX) packet 17 g  17 g Oral Daily Prashant Abler, APRN - CNP        fleet rectal enema 1 enema  1 enema Rectal Daily PRN Prashant Abler, APRN - CNP        0.9 % sodium chloride infusion   Intravenous Continuous Prashant Ablekandice, APRN - CNP 75 mL/hr at 06/28/21 1257 New Bag at 06/28/21 1257    acetaminophen (TYLENOL) tablet 650 mg  650 mg Oral Q4H PRN Prashant Abler, APRN - CNP        lidocaine 4 % external patch 2 patch  2 patch Topical Daily Prashant Robledor, APRN - CNP        levETIRAcetam (KEPPRA) 500 mg in sodium chloride 0.9 % 100 mL IVPB  500 mg Intravenous Q12H Prashant Abler, APRN - CNP        tranexamic acid (CYKLOKAPRON) 1,000 mg in sodium chloride 0.9 % 100 mL IVPB  1,000 mg Intravenous Once Prashant Monzon, APRN - CNP 12.5 mL/hr at 06/28/21 1301 1,000 mg at 06/28/21 1301    0.9 % sodium chloride infusion   Intravenous PRN Prashant Abler, APRN - CNP        insulin lispro (HUMALOG) injection vial 0-6 Units  0-6 Units Subcutaneous TID WC Prashant Robledor, APRN - CNP        insulin lispro (HUMALOG) injection vial 0-3 Units  0-3 Units Subcutaneous Nightly Prashant Abler, APRN - CNP        glucose (GLUTOSE) 40 % oral gel 15 g  15 g Oral PRN Prashant Abler, APRN - CNP        dextrose 50 % IV solution  12.5 g Intravenous PRN Prashant Abler, APRN - CNP        glucagon (rDNA) injection 1 mg  1 mg Intramuscular PRN Prashant Monzon APRN - CNP        dextrose 5 % solution  100 mL/hr Intravenous PRN PAMELA Garcia CNP        docusate sodium (COLACE) capsule 100 mg  100 mg Oral BID PAMELA Garcia CNP            traZODone, 50 mg, Nightly PRN  sodium chloride flush, 5-40 mL, PRN  sodium chloride, 25 mL, PRN  ondansetron, 4 mg, Q8H PRN   Or  ondansetron, 4 mg, Q6H PRN  fleet, 1 enema, Daily PRN  acetaminophen, 650 mg, Q4H PRN  sodium chloride, , PRN  glucose, 15 g, PRN  dextrose, 12.5 g, PRN  glucagon (rDNA), 1 mg, PRN  dextrose, 100 mL/hr, PRN         sodium chloride      sodium chloride 75 mL/hr at 06/28/21 1257    sodium chloride      dextrose           ALLERGIES:   Bactrim [sulfamethoxazole-trimethoprim], Demerol, and Pcn [penicillins]    PAST MEDICAL  HISTORY:    has a past medical history of Anxiety, Blind right eye, Breast cancer metastasized to liver [facility]), Carotid stenosis, Colostomy in place [facility]), Degenerative arthritis of cervical spine, GERD (gastroesophageal reflux disease), Hypercholesteremia, Hypoestrogenism, Hypothyroidism, Lumbago, Lumbosacral spinal stenosis, MDRO (multiple drug resistant organisms) resistance, Personal history of cardiac dysrhythmia, Sigmoid diverticulosis, Spondylolisthesis of lumbosacral region, Steroid-induced hyperglycemia, Subclavian artery stenosis ([address] Superior and Inferior Pubic ramus fracture, right, closed, initial encounter ([address] SVT (supraventricular tachycardia) ([address] Temporal arteritis ([address] Vertebral artery occlusion, and Vitamin D deficiency. PAST SURGICAL  HISTORY:    has a past surgical history that includes Dilation and curettage of uterus; Cholecystectomy (1/03); Cardiac catheterization (5/07); Cataract removal (right 1/08; left 2/08); subclavian / pulmonary shunt (2002); Colonoscopy (11/06); Upper gastrointestinal endoscopy (11/06); Hysterectomy; eye surgery; other surgical history (05/27/2016);  Eye surgery (Right, 11/06/2017); pr office/outpt visit,procedure only (N/A, 9/4/2018); colostomy (09/04/2018); Endoscopy, colon, diagnostic; and Eye removal (Right, 03/2017). SOCIAL HISTORY:   Social History     Tobacco Use    Smoking status: Former Smoker     Packs/day: 0.50     Types: Cigarettes    Smokeless tobacco: Never Used   Substance Use Topics    Alcohol use: No       FAMILY HISTORY:  Family History   Problem Relation Age of Onset    Cancer Sister 61        breast    Cancer Brother 79        lung    Cancer Brother 68        colon    Cancer Son 48        lung       LABS  CBC:   Lab Results   Component Value Date    WBC 8.5 06/28/2021    RBC 4.10 06/28/2021    RBC 4.23 04/08/2019    HGB 12.1 06/28/2021    HCT 39.1 06/28/2021    MCV 95.4 06/28/2021    MCH 29.5 06/28/2021    MCHC 30.9 06/28/2021    RDW 17.0 04/08/2019     06/28/2021    MPV 11.4 06/28/2021     CMP:    Lab Results   Component Value Date     06/28/2021    K 4.1 06/28/2021     06/28/2021    CO2 28 06/28/2021    BUN 19 06/28/2021    CREATININE 0.6 06/28/2021    AGRATIO 1.7 07/23/2018    LABGLOM >90 06/28/2021    GLUCOSE 81 06/28/2021    GLUCOSE 103 07/23/2018    PROT 6.2 06/28/2021    LABALBU 4.1 06/28/2021    CALCIUM 9.2 06/28/2021    BILITOT 0.5 06/28/2021    BILITOT NEGATIVE 11/13/2018    ALKPHOS 39 06/28/2021    AST 28 06/28/2021    ALT 19 06/28/2021     PT/INR:    Lab Results   Component Value Date    INR 0.91 05/21/2019       RADIOLOGY:  Pertinent images have been reviewed. Brain MRI showed:    Heterogeneous 5.5 cm nodular peripherally enhancing cystic and solid mass centered at the right occipital/temporal lobe junction with prominent adjacent T2/FLAIR prolongation involving the corpus callosum, temporal, parietal and frontal lobes with    prominent mass effect on the right lateral ventricle and 9 mm leftward midline shift.  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             EXAMINATION:    Patient awake and alert but confused  GCS: 13/15   Pupils: Has an artificial eye in the right  Patient  opens her left eyes to voice. FCx4 with general weakness 4/5 throughout. Sensory: grossly intact  Reflexes: 1+ through out.   Planter reflex: Down response    Matt Alvarenga MD,MD  Electronically signed 6/28/2021

## 2021-06-28 NOTE — ED NOTES
Pt transported to CT scan at this time in stable condition by this nurse.      Hailee Alberto RN  06/28/21 114

## 2021-06-28 NOTE — PROGRESS NOTES
Patient arrived to unit from [facility] via bed. Patient transferred to ICU bed and placed on continuous ICU bedside monitor. Patient admitted for Fall at home, initial encounter [W19. XXXA, Y92.009]. Vitals obtained. See flowsheets. Patient's IV access includes right antecubital, right forearm . Current infusions and rates of infusion include . 9 at kvo . Assessment completed by this rn . Two nurse skin assessment completed by Emanuel elliott  and Fortino Goff . See flowsheets for assessment details. Policies and procedures of ICU able to be explained to patient at this time. Family member(s)/representative(s) present at time of admission include [facility] and [facility]  . Patient rights explained to family member(s)/representatives and patient, as able. Patient/patient's family member(s)/representative(s) Requested to have physician notified of their admission. All questions posed by patient's family member(s)/representative(s) and patient answered at this time.

## 2021-06-28 NOTE — ACP (ADVANCE CARE PLANNING)
Advance Care Planning     Advance Care Planning Activator (Inpatient)  Conversation Note      Date of ACP Conversation: 6/28/2021     Conversation Conducted with: Patient with Decision Making Capacity    ACP Activator: Dennys Barillas RN, BSN, [address] [facility] Maker:     Current Designated Health Care Decision Maker:     Primary Decision Maker: [address] [phone]    Secondary Decision Maker: Lalo Jiménez - Niece/Nephew - [phone]    Today we documented Decision Maker(s) consistent with ACP documents on file. Care Preferences    Ventilation: \"If you were in your present state of health and suddenly became very ill and were unable to breathe on your own, what would your preference be about the use of a ventilator (breathing machine) if it were available to you? \"      Would the patient desire the use of ventilator (breathing machine)?: Yes, for surgery but not long term    \"If your health worsens and it becomes clear that your chance of recovery is unlikely, what would your preference be about the use of a ventilator (breathing machine) if it were available to you? \"     Would the patient desire the use of ventilator (breathing machine)?: No      Resuscitation  \"CPR works best to restart the heart when there is a sudden event, like a heart attack, in someone who is otherwise healthy. Unfortunately, CPR does not typically restart the heart for people who have serious health conditions or who are very sick. \"    \"In the event your heart stopped as a result of an underlying serious health condition, would you want attempts to be made to restart your heart (answer \"yes\" for attempt to resuscitate) or would you prefer a natural death (answer \"no\" for do not attempt to resuscitate)? \" no       [x] Yes   [] No   Educated Patient / Evelin Herzog regarding differences between Advance Directives and portable DNR orders.     Length of ACP Conversation in minutes:  20  Conversation Outcomes:  [x] ACP discussion completed  [] Existing advance directive reviewed with patient; no changes to patient's previously recorded wishes  [] New Advance Directive completed  [] Portable Do Not Rescitate prepared for Provider review and signature  [] POLST/POST/MOLST/MOST prepared for Provider review and signature      Follow-up plan:    [x] Schedule follow-up conversation to continue planning for later this date  [] Referred individual to Provider for additional questions/concerns   [] Advised patient/agent/surrogate to review completed ACP document and update if needed with changes in condition, patient preferences or care setting    [] This note routed to one or more involved healthcare providers         Patient has been falling more lately. I reviewed Advance Directives and educated about having her DNR-CCA with her in the squad and in the room. I instructed that each time they will need to see if and especially with hospitalization. Anusha at bedside stated they saw a mass on her brain. I began talking about options that may be presented and indicated she has choices to make. I briefly discussed that some people find help and stay at home, some go to a facility. I indicated they may ask her to see oncology and they may offer surgery, chemo or radiation and stated questions to ask. I indicated that some people pursue treatment and others pursue hospice care. Neuro arrived and I stated I would come back to discuss further and see what questions they have.

## 2021-06-29 ENCOUNTER — APPOINTMENT (OUTPATIENT)
Dept: CT IMAGING | Age: 86
DRG: 025 | End: 2021-06-29
Payer: MEDICARE

## 2021-06-29 LAB
ACT TEG: 113 SECONDS (ref 86–118)
ALBUMIN SERPL-MCNC: 4.2 G/DL (ref 3.5–5.1)
ALP BLD-CCNC: 44 U/L (ref 38–126)
ALT SERPL-CCNC: 18 U/L (ref 11–66)
ANGLE, RAPID TEG: 76.4 DEG (ref 64–80)
ANION GAP SERPL CALCULATED.3IONS-SCNC: 11 MEQ/L (ref 8–16)
AST SERPL-CCNC: 29 U/L (ref 5–40)
BILIRUB SERPL-MCNC: 0.4 MG/DL (ref 0.3–1.2)
BUN BLDV-MCNC: 15 MG/DL (ref 7–22)
CALCIUM SERPL-MCNC: 9 MG/DL (ref 8.5–10.5)
CHLORIDE BLD-SCNC: 102 MEQ/L (ref 98–111)
CO2: 25 MEQ/L (ref 23–33)
CREAT SERPL-MCNC: 0.7 MG/DL (ref 0.4–1.2)
EPL-TEG: 0 % (ref 0–15)
ERYTHROCYTE [DISTWIDTH] IN BLOOD BY AUTOMATED COUNT: 14 % (ref 11.5–14.5)
ERYTHROCYTE [DISTWIDTH] IN BLOOD BY AUTOMATED COUNT: 51.3 FL (ref 35–45)
GFR SERPL CREATININE-BSD FRML MDRD: 79 ML/MIN/1.73M2
GLUCOSE BLD-MCNC: 106 MG/DL (ref 70–108)
GLUCOSE BLD-MCNC: 147 MG/DL (ref 70–108)
GLUCOSE BLD-MCNC: 72 MG/DL (ref 70–108)
GLUCOSE BLD-MCNC: 91 MG/DL (ref 70–108)
GLUCOSE BLD-MCNC: 93 MG/DL (ref 70–108)
HCT VFR BLD CALC: 42.6 % (ref 37–47)
HEMOGLOBIN: 12.9 GM/DL (ref 12–16)
HEPARIN THERAPY: NO
KINETICS RAPID TEG: 1.2 MINUTES (ref 0.5–2.3)
LY30 (LYSIS) TEG: 0 % (ref 0–7.5)
MA(MAX CLOT) RAPID TEG: 65.1 MM (ref 52–71)
MCH RBC QN AUTO: 29.9 PG (ref 26–33)
MCHC RBC AUTO-ENTMCNC: 30.3 GM/DL (ref 32.2–35.5)
MCV RBC AUTO: 98.8 FL (ref 81–99)
PLATELET # BLD: 156 THOU/MM3 (ref 130–400)
PMV BLD AUTO: 11.3 FL (ref 9.4–12.4)
POTASSIUM REFLEX MAGNESIUM: 4.6 MEQ/L (ref 3.5–5.2)
RBC # BLD: 4.31 MILL/MM3 (ref 4.2–5.4)
REACTION TIME RAPID TEG: 0.7 MINUTES (ref 0.4–1)
SODIUM BLD-SCNC: 138 MEQ/L (ref 135–145)
TOTAL PROTEIN: 6.1 G/DL (ref 6.1–8)
WBC # BLD: 8.8 THOU/MM3 (ref 4.8–10.8)

## 2021-06-29 PROCEDURE — 99232 SBSQ HOSP IP/OBS MODERATE 35: CPT | Performed by: SURGERY

## 2021-06-29 PROCEDURE — 2580000003 HC RX 258: Performed by: NURSE PRACTITIONER

## 2021-06-29 PROCEDURE — 97166 OT EVAL MOD COMPLEX 45 MIN: CPT

## 2021-06-29 PROCEDURE — 97162 PT EVAL MOD COMPLEX 30 MIN: CPT

## 2021-06-29 PROCEDURE — 2500000003 HC RX 250 WO HCPCS: Performed by: NURSE PRACTITIONER

## 2021-06-29 PROCEDURE — 85027 COMPLETE CBC AUTOMATED: CPT

## 2021-06-29 PROCEDURE — 97535 SELF CARE MNGMENT TRAINING: CPT

## 2021-06-29 PROCEDURE — 82948 REAGENT STRIP/BLOOD GLUCOSE: CPT

## 2021-06-29 PROCEDURE — 70450 CT HEAD/BRAIN W/O DYE: CPT

## 2021-06-29 PROCEDURE — 99233 SBSQ HOSP IP/OBS HIGH 50: CPT | Performed by: INTERNAL MEDICINE

## 2021-06-29 PROCEDURE — APPSS30 APP SPLIT SHARED TIME 16-30 MINUTES: Performed by: PHYSICIAN ASSISTANT

## 2021-06-29 PROCEDURE — 6370000000 HC RX 637 (ALT 250 FOR IP): Performed by: NURSE PRACTITIONER

## 2021-06-29 PROCEDURE — 97530 THERAPEUTIC ACTIVITIES: CPT

## 2021-06-29 PROCEDURE — 6360000002 HC RX W HCPCS: Performed by: NURSE PRACTITIONER

## 2021-06-29 PROCEDURE — 2060000000 HC ICU INTERMEDIATE R&B

## 2021-06-29 PROCEDURE — 97129 THER IVNTJ 1ST 15 MIN: CPT

## 2021-06-29 PROCEDURE — 99233 SBSQ HOSP IP/OBS HIGH 50: CPT | Performed by: NEUROLOGICAL SURGERY

## 2021-06-29 PROCEDURE — 97110 THERAPEUTIC EXERCISES: CPT

## 2021-06-29 PROCEDURE — 92526 ORAL FUNCTION THERAPY: CPT

## 2021-06-29 PROCEDURE — 80053 COMPREHEN METABOLIC PANEL: CPT

## 2021-06-29 PROCEDURE — 36415 COLL VENOUS BLD VENIPUNCTURE: CPT

## 2021-06-29 RX ORDER — HYDRALAZINE HYDROCHLORIDE 20 MG/ML
10 INJECTION INTRAMUSCULAR; INTRAVENOUS EVERY 8 HOURS PRN
Status: DISCONTINUED | OUTPATIENT
Start: 2021-06-29 | End: 2021-07-08 | Stop reason: HOSPADM

## 2021-06-29 RX ADMIN — MANNITOL 50 G: 20 INJECTION, SOLUTION INTRAVENOUS at 10:34

## 2021-06-29 RX ADMIN — HYDRALAZINE HYDROCHLORIDE 10 MG: 20 INJECTION, SOLUTION INTRAMUSCULAR; INTRAVENOUS at 05:12

## 2021-06-29 RX ADMIN — ATORVASTATIN CALCIUM 10 MG: 10 TABLET, FILM COATED ORAL at 20:21

## 2021-06-29 RX ADMIN — ACETAMINOPHEN 650 MG: 325 TABLET ORAL at 08:04

## 2021-06-29 RX ADMIN — CALCIUM CARBONATE-VITAMIN D TAB 500 MG-200 UNIT 1 TABLET: 500-200 TAB at 10:34

## 2021-06-29 RX ADMIN — DEXAMETHASONE SODIUM PHOSPHATE 4 MG: 4 INJECTION, SOLUTION INTRA-ARTICULAR; INTRALESIONAL; INTRAMUSCULAR; INTRAVENOUS; SOFT TISSUE at 17:35

## 2021-06-29 RX ADMIN — MANNITOL 50 G: 20 INJECTION, SOLUTION INTRAVENOUS at 01:04

## 2021-06-29 RX ADMIN — Medication 1 TABLET: at 08:35

## 2021-06-29 RX ADMIN — LEVOTHYROXINE SODIUM 100 MCG: 0.1 TABLET ORAL at 08:04

## 2021-06-29 RX ADMIN — SERTRALINE 50 MG: 50 TABLET, FILM COATED ORAL at 08:36

## 2021-06-29 RX ADMIN — SERTRALINE 50 MG: 50 TABLET, FILM COATED ORAL at 20:21

## 2021-06-29 RX ADMIN — CETIRIZINE HYDROCHLORIDE 10 MG: 10 TABLET, FILM COATED ORAL at 08:35

## 2021-06-29 RX ADMIN — SODIUM CHLORIDE: 9 INJECTION, SOLUTION INTRAVENOUS at 16:39

## 2021-06-29 RX ADMIN — ACETAMINOPHEN 650 MG: 325 TABLET ORAL at 13:52

## 2021-06-29 RX ADMIN — LEVETIRACETAM 500 MG: 100 INJECTION INTRAVENOUS at 23:17

## 2021-06-29 RX ADMIN — DOCUSATE SODIUM 100 MG: 100 CAPSULE, LIQUID FILLED ORAL at 08:09

## 2021-06-29 RX ADMIN — CALCIUM CARBONATE-VITAMIN D TAB 500 MG-200 UNIT 1 TABLET: 500-200 TAB at 20:21

## 2021-06-29 RX ADMIN — DOCUSATE SODIUM 100 MG: 100 CAPSULE, LIQUID FILLED ORAL at 20:22

## 2021-06-29 RX ADMIN — ISOSORBIDE MONONITRATE 30 MG: 30 TABLET ORAL at 08:35

## 2021-06-29 RX ADMIN — PANTOPRAZOLE SODIUM 40 MG: 40 TABLET, DELAYED RELEASE ORAL at 08:09

## 2021-06-29 RX ADMIN — LEVETIRACETAM 500 MG: 100 INJECTION INTRAVENOUS at 13:02

## 2021-06-29 RX ADMIN — SODIUM CHLORIDE, PRESERVATIVE FREE 10 ML: 5 INJECTION INTRAVENOUS at 08:26

## 2021-06-29 ASSESSMENT — PAIN SCALES - GENERAL
PAINLEVEL_OUTOF10: 10
PAINLEVEL_OUTOF10: 0
PAINLEVEL_OUTOF10: 9
PAINLEVEL_OUTOF10: 0

## 2021-06-29 ASSESSMENT — PAIN DESCRIPTION - LOCATION: LOCATION: HEAD

## 2021-06-29 ASSESSMENT — PAIN DESCRIPTION - DESCRIPTORS: DESCRIPTORS: ACHING

## 2021-06-29 ASSESSMENT — PAIN DESCRIPTION - PAIN TYPE: TYPE: ACUTE PAIN

## 2021-06-29 NOTE — PROGRESS NOTES
Physician Progress Note      Nelly Madden  CSN #:                  [id number]  :                       1933  ADMIT DATE:       2021 9:03 AM  DISCH DATE:  RESPONDING  PROVIDER #:        Haider Thomas PA-C          QUERY TEXT:    Pt admitted s/p fall with subarachnoid hemorrhage and also noted to have   possible glioma. Pt noted to have MRI with prominent mass effect on the right   lateral ventricle and 9 mm leftward midline shift. If clinically   significant, please document in progress notes and discharge summary if you   are evaluating/treating any of the following: The medical record reflects the following:    Risk Factors: brain mass/Glioma, subarachnoid hemorrhage  Clinical Indicators: MRI prominent mass effect on the right lateral ventricle   and 9 mm leftward midline shift, Glioma favored, CT Mixed density mass in the   right parietal occipital lobe with extensive surrounding white matter edema   and minimal parenchymal and subarachnoid hemorrhage. There is midline shift 8   mm left to right. Compression of the right lateral ventricle head, repeat CT    Extensive right hemispheric abnormality is concerning for tumor, with   mass effect. Treatment: NS consult, IV Decadron    Thank you! Inessa Berumen CRCR  RN Clinical   P: [phone]  Options provided:  -- Cerebral edema  -- Brain compression  -- Cerebral edema and Brain compression  -- Other - I will add my own diagnosis  -- Disagree - Not applicable / Not valid  -- Disagree - Clinically unable to determine / Unknown  -- Refer to Clinical Documentation Reviewer    PROVIDER RESPONSE TEXT:    This patient has cerebral edema.     Query created by: Neo Kaur on 2021 11:16 AM      Electronically signed by:  Haider Thomas PA-C 2021 1:59 PM

## 2021-06-29 NOTE — PROGRESS NOTES
Patient:  Yasmeen Castillo  MRN: [id number]   PCP: Zuhair Castro MD  Date of Admission: 6/28/2021    Assessment and Plan(All pulmonary edema, renal failure, PE, and respiratory failure diagnoses are acute in nature unless otherwise specified):          1. Fall: Associated with head trauma obvious injury to the right frontal region. Associated with small subarachnoid hemorrhage. 2. Subarachnoid hemorrhage: Traumatic in nature. TXA. Maintain systolic blood pressure between 100-160. TEG normal.  No progression on CT head 6/29/21.  3. Right occipital and parietal lobe brain mass: Suspicious for glioblastoma. Neurosurgery consulted. Seizure prophylaxis with Keppra. Possible surgery on Friday. 4. Temporal arteritis: On steroids. 5. Hyperlipidemia: On atorvastatin. 6. Hypothyroidism: On Synthroid. 7. History of breast cancer:  Ultrasound shows no metastatic process to the liver. Obtain ultrasound of the liver. No suspicious lesion identified on CT scan abdomen and pelvis. CC: Brain mass with edema. HPI: Patient is an 77-year-old frail white female reformed smoker. She had her right eye removed in 2017. She has remote cholecystectomy in 2003 and a remote partial hysterectomy. Patient has a history of hypothyroidism, GERD and degenerative arthritis. She has a history of left subclavian artery stenosis and left vertebral artery occlusion. Patient has temporal arteritis with right-sided blindness. She has a history of breast cancer diagnosed in 2016 with an isolated liver lesion. Patient has a history of recurrent falls. Patient sustained a fall early this morning while trying to go to the bathroom. She states that her legs gave out when she was trying to ambulate and she fell to the ground. She apparently hit her head on the nightstand injuring her right eye. There was no loss of consciousness or seizure activity. She was too weak to bear her own weight.   When home health arrived they found her on the floor at 8 AM.  He was transferred to the emergency room where she underwent a thorough work-up. She had an abnormal CT scan which precipitated MRI head. Findings were consistent with a right sided brain mass consistent with glioblastoma. As patient had small subarachnoid hemorrhage, she was administered TXA. Blood pressure parameters were maintained systolic blood pressure between 100-160. Neurosurgery was consulted. For further details, please review the assessment and plan. ROS: Positive bowel movement. Slight headache. No vomiting. No seizures. PMH:  Per HPI  SHX: Previous smoker at half a pack of cigarettes a day. FHX: Sister had breast cancer. Allergies: Bactrim and penicillin  Medications:     sodium chloride      sodium chloride 75 mL/hr at 06/28/21 1257    sodium chloride      dextrose        calcium-vitamin D  1 tablet Oral BID    isosorbide mononitrate  30 mg Oral Daily    levothyroxine  100 mcg Oral Daily    cetirizine  10 mg Oral Daily    multivitamin  1 tablet Oral Daily    pantoprazole  40 mg Oral QAM AC    sertraline  50 mg Oral BID    atorvastatin  10 mg Oral Nightly    sodium chloride flush  5-40 mL Intravenous 2 times per day    polyethylene glycol  17 g Oral Daily    lidocaine  2 patch Topical Daily    levetiracetam  500 mg Intravenous Q12H    insulin lispro  0-6 Units Subcutaneous TID WC    insulin lispro  0-3 Units Subcutaneous Nightly    docusate sodium  100 mg Oral BID    dexamethasone  4 mg Intravenous Daily       Vital Signs:   T: 98.5: P: 69 RR: 19 B/P: 121/57: FiO2: RA: O2 Sat:94: I/O: 2691/1900 GCS: 15  Body mass index is 26.48 kg/m². [facility]:   Elderly frail-appearing white female. HEENT:  normocephalic. No scleral icterus. PERR. Right eye swollen with hematoma. Right eye is blind. Neck: supple. No Thyromegaly. Lungs: clear to auscultation. No retractions  Cardiac: RRR. No JVD. Abdomen: soft. Nontender. Nondistended. Extremities:  No clubbing, cyanosis, or edema x 4. Vasculature: capillary refill < 3 seconds. Palpable dorsalis pedis pulses. Skin:  warm and dry. Diminished turgor. Psych:  Alert and oriented x3. Affect appropriate  Lymph:  No supraclavicular adenopathy. Neurologic:  No focal deficit. No seizures. No upper extremity or lower extremity pronator drift. Patient has slight left facial droop. No dysarthria. No dysphasia. Data: (All radiographs, tracings, PFTs, and imaging are personally viewed and interpreted unless otherwise noted).  Telemetry shows sinus rhythm.  Liver ultrasound shows no evidence of metastatic disease. It did show some right sided hydronephrosis. Report 6/28/2021.  CT scan head consistent with brain mass as previously described. Surrounding edema noted.  Sodium 138, potassium 4.6, chloride 102, bicarb 25, BUN 15, creatinine 0.7, glucose 93. Albumin 4.2. White blood cell count 8.8, hemoglobin 12.9, platelets 159. Case reviewed with Dr. Efraín Jacobsen. Plans are to meet with the patient and family around 2 PM today. Electronically signed by Shaista Robison M.D.

## 2021-06-29 NOTE — PROGRESS NOTES
[address] THERAPY  [facility] ICU 4D  DAILY NOTE    TIME   SLP Individual Minutes  Time In: 0152  Time Out: 8296  Minutes: 16  Timed Code Treatment Minutes: 8 Minutes   Dysphagia therapy: 8 minutes  Cognitive therapy: 8 minutes       Date: 2021  Patient Name: Prabhjot Faustin      CSN: [id number]   : 1933  (80 y.o.)  Gender: female   Referring Physician:  PAMELA Echols CNP  Diagnosis: Fall at home, initial encounter  Secondary Diagnosis: Dysphagia, cognitive deficits  Precautions: Fall risk, aspiration precautions, low stimulation  Current Diet: Soft and bite size, thin liquids   Swallowing Strategies: Full Upright Position, Small Bite/Sip, Pulmonary Monitoring, Oral Care after all Meals, Direct 1:1 Supervision, Alternate Solids and Liquids, Limit Distractions and Monitor for Fatigue       Date of Last MBS/FEES: Not Applicable    Pain:  No pain reported. Subjective:  Pt resting in recliner chair post PT evaluation; fatigue noted, however, did not impact participation measures. No family at bedside. Short-Term Goals:  SHORT TERM GOAL #1:  Goal 1: Pt will safely consume soft and bite size diet wtih thin liquids (advanced texture trials as appropriate; direct 1:1 assistance) without overt s/s aspiration or pulmonary compromise to assist with nutrition/hydration measures. INTERVENTIONS: Advanced texture trials completed for consumption of hard/coarse solids. Needs for direct 1:1 assistance given limitations with vision resulting in difficulties for manipulation of food items and cup/straw. 75% presented trial consumed of dalia cracker with min deficits evidenced for cohesive bolus formation (presence of min oral stasis spread diffusely throughout cavity) with sufficient clearance of residuals with liquid assist. Thin H20 via straw what what appeared to be adequate control/containment of fluid bolus.  No overt s/s aspiration exhibited across all consistencies/trials consumed, certainly not able to r/o totality of airway invasion events and/or pharyngeal dysfunction at bedside alone; pt's swallow physiology appears functional to support goal for comfortable oral intake without distress. Despite relative success within advanced texture trials, full dietary upgrade is not warranted at this time given concerns for overall endurance levels that could potentially result in swallow decompensation. Recommendations to maintain soft and bite size diet with thin liquids, subsequent dysphagia interventions to focus on advanced meal trials. SHORT TERM GOAL #2:  Goal 2: Pt will complete functional immediate/delayed recall tasks (inclusion of compensatory strategies) with 60% accuracy, mod cues to improve retention of pertinent information. INTERVENTIONS: Orientation: 4/6 independent, 2/6 logical cues for [place] and date    3-unit recall  Topic: details relevant to treating ST  Strategies: repetition/rehearsal (x3 trials)   -Immediate memory: 2/3 independent, 1/3 min cues   -Delay x5 minutes: 1/3 independent, 2/3 FO2    SHORT TERM GOAL #3:  Goal 3: Pt will complete problem solving, verbal/visual reasoning, and executive functioning tasks (time, basic money/math/medications) with 60% accuracy, mod cues to improve contribution within ADLs/IADLs. INTERVENTIONS: Problem solving:  -Home scenarios: 3/5 independent, 2/5 min cues to enhance awareness; independent recall for emergency phone number [facility] #4:  Goal 4: Pt will complete sequencing and thought organization tasks with 70% accuracy, mod cues to improve mental flexibility and processing speed. INTERVENTIONS: DNT d/t focus on additional STGs    SHORT TERM GOAL #5:  Goal 5: Pt will complete selective attention tasks (focus on the left) with no more than 5 errors until task completion to optimize reading comprehension and visual scanning requried for ADLs.   INTERVENTIONS: Selective Attention  -Visual locating for letter, \"A\": 4/8 independent, 2/8 min cues, 2/8 mod cues  *consistent errors for locating letter in L visual field  *maintained concerns for left neglect and visual proprioception     SHORT TERM GOAL #6:  Goal 6: Pt and family will exhibit return demonstration for implementation of low stimulation guidelines/protocol given concerns within acute intracranial findings. INTERVENTIONS: Upon arrival to pt's room, lights+television turned off with adherence to low stimulation recommendations in place. Will f/u with further education as needed. Long-Term Goals:  No LTG established given short ELOS     EDUCATION:  Learner: Patient  Education:  Reviewed diet and strategies, Reviewed signs, symptoms and risks of aspiration, Reviewed ST goals and Plan of Care and Reviewed recommendations for follow-up  Evaluation of Education: Demonstrates with assistance, Needs further instruction and Family not present    ASSESSMENT/PLAN:  Activity Tolerance:  Patient tolerance of  treatment: good. Cooperative with ST and POC. Assessment/Plan: Patient progressing toward established goals. Continues to require skilled care of licensed speech pathologist to progress toward achievement of established goals and plan of care. .     Plan for Next Session: visual scanning, reasoning       Nehal Cobb M.A., [address] [place] [address]

## 2021-06-29 NOTE — PLAN OF CARE
Problem: Pain:  Goal: Pain level will decrease  Description: Pain level will decrease  Outcome: Ongoing  Note: Pain Assessment: 0-10  Pain Level: 0   Patient's Stated Pain Goal: No pain   Is pain goal met at this time? Yes          Goal: Control of acute pain  Description: Control of acute pain  Outcome: Ongoing  Goal: Control of chronic pain  Description: Control of chronic pain  Outcome: Ongoing     Problem: Skin Integrity:  Goal: Will show no infection signs and symptoms  Description: Will show no infection signs and symptoms  Outcome: Ongoing  Note: No new skin breakdown this shift. Redness to buttocks/bruising scattered. Goal: Absence of new skin breakdown  Description: Absence of new skin breakdown  Outcome: Ongoing  Note: Patient is absent of new skin breakdown. Problem: Falls - Risk of:  Goal: Will remain free from falls  Description: Will remain free from falls  Outcome: Ongoing  Note: Patient is free from falls this shift. Goal: Absence of physical injury  Description: Absence of physical injury  Outcome: Completed  Note: No physical injury noted. Problem: Discharge Planning:  Goal: Discharged to appropriate level of care  Description: Discharged to appropriate level of care  Outcome: Ongoing  Note: No discharge plans this shift. Possible ECF placement. [facility]     Care plan reviewed with patient. Patient verbalize understanding of the plan of care and contribute to goal setting.

## 2021-06-29 NOTE — ED PROVIDER NOTES
[address], Pt Name: Scott Huang  MRN: [id number]  [place] 5/29/1933  Date of evaluation: 9/12/20      I personally saw and examined the patient. I have reviewed and agree with the Resident findings, including all diagnostic interpretations and treatment plans as written. I was present for the key portion of any procedures performed and the inclusive time noted in any critical care statement. History: This is an 69-year-old female who was brought in as a level 2 trauma. She had a fall and hit her head and has some periorbital bruising and swelling. Apparently she has been falling a lot lately she has been getting weaker and weaker and the medics told me they have been out to her home multiple times and they tried to bring her in but she refused to go. She does not seem confused but seems globally weak. She has a history of vascular disease. She is noted to be on Plavix. We ended up getting CT scans of the head, cervical spine, chest abdomen pelvis with thoracic and lumbar reconstructions that she was having pain in multiple areas. She was found to have a mass right parietal occipital lobe with surrounding edema and minimal surrounding hemorrhage with midline shift of 8 mm left to right. There is compression of the right lateral ventricle. Cervical spine chest abdomen pelvis interpreted by the radiologist with no acute traumatic process. Thoracic and lumbar interpreted by the radiologist with chronic changes. She appears to have a new brain tumor this is likely causing her debility. Her problems with ambulation. She apparently cannot even get around with a walker now. She was seen by the trauma service. The brain tumor is of course a medical issue but the hemorrhage is unclear whether or not it was related to the fall so the patient is being admitted to the trauma service. Neurosurgery has been contacted.   Going to the intensive care unit. CRITICAL CARE: There was a high probability of clinically significant/life threatening deterioration in this patient's condition which required my urgent intervention. Total critical care time was 30  minutes. This excludes any time for separately reportable procedures. Diagnosis: new onset brain mass, general weakness, multiple falls, subarachnoid bleed surrounding brain mass.     Admitted                Giuseppe Del Rio DO  06/29/21 6798

## 2021-06-29 NOTE — FLOWSHEET NOTE
Pt was alone and was sitting. She was dealing with the effects of fall. She wanted prayer to cope and heal. It was appreciated. 06/29/21 5677   Encounter Summary   Services provided to: Patient   Referral/Consult From: [address] [place] [address] Family members   Continue Visiting Yes  (6/29 )   Complexity of Encounter Moderate   Length of Encounter 15 minutes   Spiritual/Jehovah's witness   Type Spiritual support   Assessment Approachable;Calm   Intervention Prayer;Nurtured hope   Outcome Connection/belonging;Expressed gratitude;Encouraged; Hopeful;Receptive

## 2021-06-29 NOTE — CARE COORDINATION
6/29/21, 9:55 AM EDT  DISCHARGE PLANNING EVALUATION:    Sabina Kessler       Admitted: 6/28/2021/ [facility] day: 1   Location: -11/011-A Reason for admit: Fall at home, initial encounter [W19. Joey Fairbanks, [phone]]   PMH:  has a past medical history of Anxiety, Blind right eye, Breast cancer metastasized to liver [facility]), Carotid stenosis, Colostomy in place [facility]), Degenerative arthritis of cervical spine, GERD (gastroesophageal reflux disease), Hypercholesteremia, Hypoestrogenism, Hypothyroidism, Lumbago, Lumbosacral spinal stenosis, MDRO (multiple drug resistant organisms) resistance, Personal history of cardiac dysrhythmia, Sigmoid diverticulosis, Spondylolisthesis of lumbosacral region, Steroid-induced hyperglycemia, Subclavian artery stenosis ([address] Superior and Inferior Pubic ramus fracture, right, closed, initial encounter ([address] SVT (supraventricular tachycardia) ([address] Temporal arteritis ([address] Vertebral artery occlusion, and Vitamin D deficiency. Injuries:  Right parietoccipital lobe mass  Subarachnoid and parenchymal hemorrhage  Traumatic ecchymosis to left lower leg and left forearm  Right forehead ecchymosis    Procedure:   6/28 CT Head/Facial bones/Chest/Abd/Pelvis: See injuries  6/28 CT Cervical/Thoracic/Lumbar spine: See injuries  6/28 MRI Brain:  Heterogeneous 5.5 cm nodular peripherally enhancing cystic and solid mass centered at the right occipital/temporal lobe junction with prominent adjacent T2/FLAIR prolongation involving the corpus callosum, temporal, parietal and frontal lobes with prominent mass effect on the right lateral ventricle and 9 mm leftward midline shift. 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  No significant change since yesterday.  Less likely is recent infarct. Barriers to Discharge: Presented to ED after a fall at home.  She was going back to her bed from the bathroom, her legs became weak, and she fell. Hit right side of forehead/eye. She was found by her [facility] aide approximately 3 hours later as patient was not able to get up herself. EMS reports they have been to her home multiple times over the last few weeks for lift assistance, but she refuses to go to hospital for eval. CT's completed - see injuries listed above. Admitted to ICU; Intensivist consulted. Neurosurgery consulted for brain mass with shift, ICH. TXA given. Plan for family meeting today at 0 to discuss possible surgery. Afebrile. NSR. On room air. Ox4. Follows commands. NIH 3 - visual, facial palsy, and dysarthria. SLP/PT/OT. Seizure precautions. IS. SCDs. External urinary catheter. [facility]. IVF, lipitor, IV decadron, prn IV hydralazine, SSI, imdur, IV keppra, synthroid, lidoderm patch x2, protonix, zoloft. Received loading dose of keppra. Mannitol Q8H stopped today. Trop 0.011. PCP: Brie Kaplan MD  Readmission Risk Score: 19%    Patient Goals/Plan/Treatment Preferences: Spoke with Marybel Da Silva in the presence of her niece; states she lives at home alone and uses a cane and walker. She states she also has a BSC and shower chair. She states she is current with [facility] for a nurse and aide. She states she requires assistane with ADLS's, cooking and cleaning - all provided by her [facility] agency. Marybel Da Silva states she plans to return home at discharge and resume [facility] services as PTA. Explained may need rehab at discharge and explained IP Rehab vs SNF. SW consulted. Monitor PT/OT evals. Transportation/Food Security/Housekeeping Addressed:  No issues identified.

## 2021-06-29 NOTE — PROGRESS NOTES
[address] [facility] PHYSICAL THERAPY  EVALUATION  [facility] 4D - 4D-11/011-A    Time In: 6551  Time Out: 1427  Timed Code Treatment Minutes: 44 Minutes  Minutes: 50          Date: 2021  Patient Name: Roger Duran,  Gender:  female        MRN: [id number]  : 1933  (80 y.o.)      Referring Practitioner: Therman Osgood, APRN - CNP  Diagnosis: Fall at home  Additional Pertinent Hx: Raeford Castleman is an 80year old female presenting to the Emergency Department via EMS for evaluation of potential injuries sustained when she fell in her home this morning around 5 am.  She reports that she was going back to her bed from her bathroom when her legs became weak and she fell. She is not sure if she hit her head on the nightstand but she states that the lamp that was on the nightstand fell on top of her, hitting the right side of her forehead/eye. She denies loss of consciousness. She was found by her home health aide around 0800 as she was not able to get herself up after falling. EMS reports that they have been to [facility] home multiple times over the last few weeks for lift assist but she refuses to go to the hospital for evaluation. Raeford Castleman reports that her legs have been progressively becoming more weak. She denies headache, neck pain, chest pain, abdominal pain. She does admit to lower back pain and lower extremity weakness. No visual changes, lightheadedness or dizziness, nausea or vomiting. She denies any feelings of syncope prior to falling. Restrictions/Precautions:  Restrictions/Precautions: General Precautions, Fall Risk  Position Activity Restriction  Other position/activity restrictions: L neglect    Subjective:  Chart Reviewed: Yes  Patient assessed for rehabilitation services?: Yes  Subjective: The pt was eating breakfast upon entry. She was agreeable to PT services.     General:  Overall Orientation Status: Within Normal Limits    Vision: Impaired  Vision Exceptions:  (had R eye removed)    Hearing: Within functional limits         Pain: denied    Vitals: Blood Pressure: 122/55     Social/Functional History:    Lives With: Alone  Type of Home: House  Home Layout: One level  Home Access: Level entry  Home Equipment: [employer], 4 wheeled walker, Cane, Lift chair, Alert Button     Bathroom Shower/Tub: Tub/Shower unit, Shower chair with back  [employer]: Standard (with BSC over toilet)  Bathroom Equipment: Shower chair, 3-in-1 commode    Receives Help From: Personal care attendant  ADL Assistance: Needs assistance  Homemaking Assistance: Needs assistance  Homemaking Responsibilities: Yes  Ambulation Assistance: Independent  Transfer Assistance: Independent    Active : No     Additional Comments: 2 hours/day M-F has an aide that assists with ADLs/IADLs. nieces live nearby and able to help at times. OBJECTIVE:  Range of Motion:  Bilateral Lower Extremity: WFL    Strength:  Bilateral Lower Extremity: Impaired - 4-/5. Pt needed alot of verbal cues, especially regarding the L side     Balance:  Static Sitting Balance:  Stand By Assistance, with cues for safety, with verbal cues , with increased time for completion. Cues needed to sit upright. Dynamic Sitting Balance: Stand By Assistance, with cues for safety, with verbal cues , with increased time for completion  Static Standing Balance: Contact Guard Assistance, X 1, with cues for safety, with increased time for completion, with RW     Bed Mobility:  Supine to Sit: Moderate Assistance, X 1, with head of bed raised, with rail, with verbal cues , with increased time for completion. Pt able to move legs off EOB. Pt needed assistance with bringing trunk upright. Scooting: Contact Guard Assistance, with rail, with verbal cues , with increased time for completion    Transfers:  Sit to Stand: Minimal Assistance, X 1, with increased time for completion, cues for hand placement. Conducted 3x.    Stand to Sit:Contact Guard Assistance, with increased time for completion, cues for hand placement, with verbal cues   Stand Pivot: Contact Guard Assistance, with increased time for completion, cues for walker placement. Conducted 2x from chair<>commode    Ambulation:  Contact Guard Assistance to minimal assistance , with cues for safety, with verbal cues , with increased time for completion  Distance: 3 feet   Surface: Level Tile  Device:Rolling Walker  Gait Deviations: Forward Flexed Posture, Slow Gillian, Decreased Step Length Bilaterally, Decreased Gait Speed, Narrow Base of Support and Unsteady Gait  Pt needed frequent cues with moving the RW     Exercise:  Patient was guided in 1 set(s) 10 reps of exercise to both lower extremities. Ankle pumps, Heelslides, Hip abduction/adduction, Seated marches and Long arc quads. Exercises were completed for increased independence with functional mobility. A lot of cues to keep on task. Pt demonstrated some L sided neglect with exercises. Verbal cues needed. Functional Outcome Measures: Completed  AM-PAC Inpatient Mobility Raw Score : 12  AM-PAC Inpatient T-Scale Score : 35.33    ASSESSMENT:  Activity Tolerance:  Patient tolerance of  treatment: fair. Pt limited by endurance. Treatment Initiated: Treatment and education initiated within context of evaluation. Evaluation time included review of current medical information, gathering information related to past medical, social and functional history, completion of standardized testing, formal and informal observation of tasks, assessment of data and development of plan of care and goals. Treatment time included skilled education and facilitation of tasks to increase safety and independence with functional mobility for improved independence and quality of life. Assessment:   Body structures, Functions, Activity limitations: Decreased functional mobility , Decreased posture, Decreased endurance, Decreased ROM, Decreased strength,

## 2021-06-29 NOTE — PROGRESS NOTES
Addendum by Dr. Jim Amin MD:     I have seen and examined the patient independently. Face to face evaluation and examination was performed. The below evaluation and note have been reviewed. Labs and radiographs were reviewed. I Have discussed with Neurosurgery PA about this patient in detail. The below assessment and plan have been reviewed. Please see my modifications mentioned below. My additional comments and modifications: This is an 80-year-old female with a newly diagnosed huge brain mass assoiciated with significant midline shift       In today evaluation and visit:     · No acute event over the night. · Patient is more awake and oriented today. Her GCS is 15/15 today. Otherwise, there is no change in patient physical exam compared with yesterday. Today brain CT showed stable exam:       Extensive right hemispheric abnormality is concerning for tumor, with mass    effect.  No significant change since yesterday            Patient's recommendation and treatment plan from neurosurgical perspective at this time:        · Continue medical management per ICU team.  · Seizure precaution (Keppra 500 mg every 12h). · Dexamethasone 4 mg every 6 IV. · Neurosurgery to follow. Decision making:     - Given the finding of patient MRI (isolated newly diagnosed right intracerebral intra parenchymal lesion that reaching the surface of the brain), the findings of her C/A/P CT,  the current neurological exam and her  current Karnofsky performance score (at least 70%). A surgical  surgical intervention mainly the form of right craniotomy for surgical resection/debulking of right sided intracerebral tumor with 2 main goals;   1- obtaining  a tissue for definitive diagnosis. 2- Maximum Safe resection /debulking lesion. 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    -On other hand, patient's age group works against surgical intervention.    -I discussed with patient  in detail the above treatment options (surgery versus a palliative care approach) in detail, as well as the associated risk the risk and benefits of every treatment options.  -All questions and concerns were addressed and answered.  -Patient and her nurse that today (and despite patient is leaning towards going surgical intervention) patient and her family will will have a family meeting and they will inform me their final decision tomorrow.    -Neurosurgery will follow.  -The Case was discussed in detail also with ICU team.      Rupert Will MD            Neurosurgery Progress Note    Patient:  Marisol Matthews      Unit/Bed:4D-11/011-A    YOB: 1933    MRN: [id number]     Acct: [de-identified]     Admit date: 6/28/2021    Chief Complaint   Patient presents with    Trauma    Fall       Patient Seen, Chart, Physician notes, Labs, Radiology studies reviewed. Subjective: The patient is seen and evaluated in the ICU setting with evaluation exam findings reviewed and discussed with Dr. Liat Cooper and with nursing. Patient is resting today with pain well-controlled    Past, Family, Social History unchanged from admission. Diet:  ADULT DIET;  Dysphagia - Soft and Bite Sized    Medications:  Scheduled Meds:   calcium-vitamin D  1 tablet Oral BID    isosorbide mononitrate  30 mg Oral Daily    levothyroxine  100 mcg Oral Daily    cetirizine  10 mg Oral Daily    multivitamin  1 tablet Oral Daily    pantoprazole  40 mg Oral QAM AC    sertraline  50 mg Oral BID    atorvastatin  10 mg Oral Nightly    sodium chloride flush  5-40 mL Intravenous 2 times per day    polyethylene glycol  17 g Oral Daily    lidocaine  2 patch Topical Daily    levetiracetam  500 mg Intravenous Q12H    insulin lispro  0-6 Units Subcutaneous TID     insulin lispro  0-3 Units Subcutaneous Nightly    docusate sodium  100 mg Oral BID    dexamethasone  4 mg Intravenous Daily     Continuous Infusions:   sodium chloride      sodium chloride 75 mL/hr at 06/28/21 1257    sodium chloride      dextrose      mannitol Stopped (06/29/21 0133)     PRN Meds:hydrALAZINE, traZODone, sodium chloride flush, sodium chloride, ondansetron **OR** ondansetron, fleet, acetaminophen, sodium chloride, glucose, dextrose, glucagon (rDNA), dextrose    Objective: Patient is evaluated lying in bed with head of the bed elevated approximately 30 degrees and pain well controlled. She was without significant complaints remained stable and intact her baseline on exam today with no additional changes noted the patient chart overnight. Vitals: BP (!) 146/57   Pulse 63   Temp 98.3 °F (36.8 °C) (Oral)   Resp 18   Ht 5' (1.524 m)   Wt 135 lb 9.3 oz (61.5 kg)   LMP  (LMP Unknown)   SpO2 98%   BMI 26.48 kg/m²   Physical Exam:  Alert and attentive. Language appropriate, with no aphasia. Pupils equal.  Facial strength symmetric. Physical Exam  Remained stable and intact her baseline on exam with no additional changes noted to the patient chart overnight. Some complaints of occipital pain secondary to mechanical fall are noted. ROS:  Review of Systems  Patient denied headache, nausea or vomiting, chest pain or shortness of breath. The remaining 12 points review of systems otherwise negative. 24 hour intake/output:    Intake/Output Summary (Last 24 hours) at 6/29/2021 0723  Last data filed at 6/29/2021 0433  Gross per 24 hour   Intake 2691.02 ml   Output 1900 ml   Net 791.02 ml     Last 3 weights:   Wt Readings from Last 3 Encounters:   06/28/21 135 lb 9.3 oz (61.5 kg)   06/15/21 137 lb 4 oz (62.3 kg)   05/25/21 136 lb (61.7 kg)         CBC:   Recent Labs     06/28/21  0908 06/29/21  0405   WBC 8.5 8.8   HGB 12.1 12.9    156     BMP:    Recent Labs     06/28/21  0908 06/29/21  0405    138   K 4.1 4.6    102   CO2 28 25   BUN 19 15   CREATININE 0.6 0.7   GLUCOSE 81 106     Calcium:  Recent Labs     06/29/21  0405   CALCIUM 9.0     Magnesium:No results for input(s): MG in the last 72 hours. Glucose:  Recent Labs     06/28/21  1436 06/28/21  1632 06/28/21  2134   POCGLU 88 94 117*     HgbA1C: No results for input(s): LABA1C in the last 72 hours. Lipids: No results for input(s): CHOL, TRIG, HDL, LDLCALC in the last 72 hours. Invalid input(s): LDL    Radiology reports as per the Radiologist  Radiology: CT HEAD WO CONTRAST    Result Date: 6/28/2021  PROCEDURE: CT HEAD WO CONTRAST CLINICAL INFORMATION: fal . COMPARISON: 9/8/2019 TECHNIQUE: 2-D multiplanar noncontrast images of the brain All CT scans at this facility use dose modulation, iterative reconstruction, and/or weight-based dosing when appropriate to reduce radiation dose to as low as reasonably achievable. FINDINGS: There is heterogeneous low-density mass in the right parieto-occipital lobe image 21 with a small amount of parenchymal hemorrhage. There is small amount of subarachnoid hemorrhage associated with this. There is extensive white matter edema involving most of the right hemisphere extending into the temporal lobe. This results in compression of the right lateral ventricle and midline shift of 8 mm. There is slight mass effect on the right very subtle mass effect on the right basal cistern. Quadrigeminal cistern is intact. Calvarium is intact. Right globe prosthesis is present. Visualized paranasal sinuses and mastoid air cells are clear     Mixed density mass in the right parietal occipital lobe with extensive surrounding white matter edema and minimal parenchymal and subarachnoid hemorrhage. There is midline shift 8 mm left to right. Compression of the right lateral ventricle. Minimal mass effect on the basilar cistern. This was called to Dr. Ez Chapman at time of exam. **This report has been created using voice recognition software.   It may contain minor errors which are inherent in voice recognition technology. ** Final report electronically signed by Dr. Clara Carl on 6/28/2021 9:56 AM    CT FACIAL BONES WO CONTRAST    Result Date: 6/28/2021  PROCEDURE: CT FACIAL BONES WO CONTRAST CLINICAL INFORMATION: tr. Fall with right-sided pain and bruising. COMPARISON: CT head dated 9/8/2019 and CT facial bones dated 8/20/2017. TECHNIQUE: Helical CT of the face with sagittal and coronal reconstructions. All CT scans at this facility use dose modulation, iterative reconstruction, and/or weight-based dosing when appropriate to reduce radiation dose to as low as reasonably achievable. FINDINGS:  Maxillofacial bones are normally aligned without visualized fracture. The mandible appears intact. Patient is edentulous. There is rightward deviation of the nasal septum, stable compared to prior exams. There is a right-sided globe prosthesis, stable compared to prior exam. Paranasal sinuses are clear. Mastoid air cells and middle ears are clear. No evidence of acute osseous injury of the face. **This report has been created using voice recognition software. It may contain minor errors which are inherent in voice recognition technology. ** Final report electronically signed by Dr. Sherry Carrera MD on 6/28/2021 10:04 AM    CT CHEST W CONTRAST    Result Date: 6/28/2021  PROCEDURE: CT CHEST W CONTRAST CLINICAL INFORMATION: tr COMPARISON: 8/26/2016 TECHNIQUE:  Axial CT images were obtained through the chest following the intravenous and demonstration of 80 mL Isovue 370 contrast. Coronal and sagittal reformatted images were rendered. All CT scans at this facility use dose modulation, iterative reconstruction, and/or weight-based dosing when appropriate to reduce radiation dose to as low as reasonably achievable. FINDINGS: The central airways appear patent. There is a 3 mm pulmonary nodule within the left lower lobe demonstrated on axial image 58.  This appears unchanged from 8/26/2016 and likely represents a poorly calcified granuloma given its stability. No pneumothorax or pleural effusion is seen. No mediastinal hematoma is demonstrated. CT of the abdomen and pelvis are reported separately. No acute osseous findings are demonstrated. 1. No acute traumatic intrathoracic findings. **This report has been created using voice recognition software. It may contain minor errors which are inherent in voice recognition technology. ** Final report electronically signed by Dr. Cheyenne Díaz on 6/28/2021 10:00 AM    CT CERVICAL SPINE WO CONTRAST    Result Date: 6/28/2021  PROCEDURE: CT CERVICAL SPINE WO CONTRAST CLINICAL INFORMATION: tr. Right-sided neck pain and bruising after fall. COMPARISON: CT cervical spine dated 9/8/2019. TECHNIQUE: 3 mm noncontrast axial images were obtained through the cervical spine with sagittal and coronal reconstructions. All CT scans at this facility use dose modulation, iterative reconstruction, and/or weight-based dosing when appropriate to reduce radiation dose to as low as reasonably achievable. FINDINGS: There is a dextrocurvature of the cervical spine which is likely positional.  There is grade 1 anterolisthesis of C7 relative to T1 on the basis of degenerative change. There is stable mild irregularity of the superior endplate of C7 likely associated with Schmorl's node. There is otherwise anatomic vertebral body height and alignment. No fracture of the cervical vertebral column is identified. The craniocervical junction appears intact. No paraspinal or epidural fluid collection is identified. Paraspinal soft tissues are grossly unremarkable. There are stable mild degenerative changes of the cervical spine. No evidence of acute osseous injury of the cervical spine. **This report has been created using voice recognition software. It may contain minor errors which are inherent in voice recognition technology. ** Final report electronically signed by Dr. Yasmin oMnsivais Luis Martin MD on 6/28/2021 9:54 AM    CT ABDOMEN PELVIS W IV CONTRAST Additional Contrast? None    Result Date: 6/28/2021  PROCEDURE: CT ABDOMEN PELVIS W IV CONTRAST CLINICAL INFORMATION: tr . COMPARISON: 8/12/2020 TECHNIQUE: 2-D multiplanar postcontrast images of the abdomen and pelvis Isovue-370 IV contrast. All CT scans at this facility use dose modulation, iterative reconstruction, and/or weight-based dosing when appropriate to reduce radiation dose to as low as reasonably achievable. FINDINGS: No solid organ injury. Liver and spleen are within normal limits. There may be early arterial enhancement causing heterogeneous enhancement of the spleen. Appearance of the liver also suggests early arterial enhancement. Pancreas is unremarkable. Adrenals are normal. Stable cyst lower pole left kidney. Kidneys enhance symmetrically and appear otherwise normal. Heavy atherosclerosis of the aorta. Right common iliac artery stent. No adenopathy. Left anterior abdominal hernia containing colon and small bowel. No obstruction at this time. Postsurgical changes right colon near the cecum. Urinary bladder is distended. Uterus is present. Postsurgical changes rectosigmoid colon. Bones 9 mm anterolisthesis L5 on S1. Degenerative disc disease. No acute or suspicious bone lesions identified. Remote healed fracture of the right superior and inferior pubic rami. Multiple chronic findings. No acute abdominal or pelvic abnormality. **This report has been created using voice recognition software. It may contain minor errors which are inherent in voice recognition technology. ** Final report electronically signed by Dr. Luke De La Cruz on 6/28/2021 10:03 AM    CT LUMBAR RECONSTRUCTION WO POST PROCESS    Result Date: 6/28/2021  PROCEDURE: CT LUMBAR RECONSTRUCTION WO POST PROCESS CLINICAL INFORMATION: fall. Right-sided pain and bruising. COMPARISON: Cervical spine radiographs dated 3/16/2021.  TECHNIQUE: 3 mm axial, sagittal and coronal CT with grade 2 anterolisthesis of L5 relative to S1 with associated moderate to severe neural foraminal stenosis. 3. Multilevel degenerative changes elsewhere. Please refer to the body of the report for segmental analysis. **This report has been created using voice recognition software. It may contain minor errors which are inherent in voice recognition technology. ** Final report electronically signed by Dr. Pablo Morales MD on 6/28/2021 10:00 AM    CT THORACIC RECONSTRUCTION WO POST PROCESS    Result Date: 6/28/2021  PROCEDURE: CT THORACIC RECONSTRUCTION WO POST PROCESS CLINICAL INFORMATION: Trauma . COMPARISON: No prior exam TECHNIQUE: 2-D multiplanar reconstructed images of the thoracic spine. All CT scans at this facility use dose modulation, iterative reconstruction, and/or weight-based dosing when appropriate to reduce radiation dose to as low as reasonably achievable. FINDINGS: Increased thoracic kyphosis. No acute fracture or acute bony malalignment. Diffuse osteopenia. Diffuse degenerative disc disease. No central canal encroachment. Dextroscoliosis. No foraminal encroachment or paraspinal soft tissue abnormality. No acute abnormality of the thoracic spine. **This report has been created using voice recognition software. It may contain minor errors which are inherent in voice recognition technology. ** Final report electronically signed by Dr. Alexis Mantilla on 6/28/2021 10:06 AM    MRI BRAIN W WO CONTRAST    Result Date: 6/28/2021  PROCEDURE: MRI BRAIN W WO CONTRAST INDICATION:brain mass. Fall today. COMPARISON: CT head from the same date and MR brain dated 10/23/2009. TECHNIQUE: Multiplanar and multiple spin echo T1 and T2-weighted images were obtained through the brain before and after the administration of intravenous contrast. 15 mL ProHance was injected in the right AC.  FINDINGS: There is an intra-axial multilobulated mass centered at the right occipital/temporal lobe junction measuring 5.5 x 3.9 cm in greatest axial dimensions. There are lobulated low T1, hyperintense T2 signal components centrally. There is thickened and nodular irregular peripheral enhancement following contrast administration. There are nodular enhancing septations centrally and inferiorly within the lesion. No other enhancing lesions are identified within the parenchyma. There is prominent T2/FLAIR prolongation adjacent to the lesion extending anteriorly into the temporal, parietal and frontal lobes and in the right basal ganglia, as well as the splenium of the corpus callosum. There is prominent mass effect on the occipital horn of the right lateral ventricle with near complete effacement. There is 9 millimeters leftward midline shift at the level of the foramen of [place]. There are moderate confluent and patchy areas of T2/FLAIR prolongation in the periventricular, subcortical deep white matter elsewhere. No focal areas of restricted diffusion are present. The major vascular flow voids appear patent. There is artifact from patient's right ocular implant. Patient is status post lens extraction on the left. Orbits are otherwise unremarkable. Paranasal sinuses and mastoid air cells are clear. Heterogeneous 5.5 cm nodular peripherally enhancing cystic and solid mass centered at the right occipital/temporal lobe junction with prominent adjacent T2/FLAIR prolongation involving the corpus callosum, temporal, parietal and frontal lobes with prominent mass effect on the right lateral ventricle and 9 mm leftward midline shift. 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 **This report has been created using voice recognition software. It may contain minor errors which are inherent in voice recognition technology. ** Final report electronically signed by Dr. Kyle Phelps MD on 6/28/2021 12:45 PM    A/P: S/P patient is seen and evaluated on the floor with evaluation exam findings reviewed and discussed with  Angela/neurosurgeon and with nursing. Patient is resting comfortably with pain well-controlled and remained stable and intact to her baseline with no additional changes noted the patient chart overnight. An MRI of the brain performed yesterday with and without contrast is reviewed to reveal heterogeneous 5.5 cm nodular peripherally enhancing cystic and solid mass centered to the right occipital temporal lobe junction with effect on the right lateral ventricle and 9 mm leftward shift noted. High-grade glioma is suspected. Nursing relates a family meeting at approximately 2 PM today to discuss the patient's candidacy for any possible surgical intervention.   Neurosurgery to follow    Electronically signed by Judy Mcneal PA-C on 6/29/2021 at 7:23 AM

## 2021-06-29 NOTE — PROGRESS NOTES
Kareem Veloz  Daily Progress Note    Pt Name: Wilbert Posada Record Number: [id number]  Date of Birth 5/29/1933   Today's Date: 6/29/2021    HD: # 1    CC: \"Good\"    ASSESSMENT  1. Active Hospital Problems    Diagnosis Date Noted    Fall [W19. XXXA] 06/28/2021    Traumatic ecchymosis of forehead [S00.83XA] 06/28/2021    Right parietal lobe mass [G93.89] 06/28/2021    Subarachnoid hemorrhage following injury, no loss of consciousness ([facility] [address] [S06.6X0A] 06/28/2021    Traumatic ecchymosis of left lower leg [S80.12XA] 06/28/2021    Traumatic ecchymosis of left forearm [S50.12XA] 06/28/2021    Platelet inhibition due to Plavix [Z79.02] 06/28/2021    Fall at home, initial encounter [W19. Narinder , D79.601] 06/28/2021         PLAN  Admit to the ICU under Trauma Services     Trauma by frequent falls              - Fall precautions              - PT, OT eval and treat     Right parietoccipital lobe mass with mass effect and midline shift              - Neurosurgery consulted              - MRI of the brain: High grade glioma, glioblastoma multiform, favored              - Intensivist consulted for possible surgical clearance   - Per NSx family meeting at 2 pm today to discuss possible surgical intervention     Subarachnoid and parenchymal hemorrhage              - Neurosurgery consulted              - TXA and platelets ordered              - Keppra 1,000mg loading dose then 500mg BID              - Repeat head CT this AM: stable              - Neuro checks              - Maintain SBP <160 >100              - Seizure precautions               - Plavix and Aspirin held     Traumatic ecchymosis to left lower leg and left forearm, right forehead ecchymosis              - Plavix and aspirin held     Bilateral lower extremity weakness              - Up with assistance              - PT, OT eval and treat              - Fall precautions     Hx of hypothyroidism, anxiety, GERD, SVT, HLD, steroid-induced hyperglycemia, breast cancer with mets to the liver              - Home meds resumed              - Accuchecks ACHS with sliding scale insulin              - Intensivist consulted for assistance with medical management     Pain control              - Tylenol PRN              - Lidoderm patches     General diet  IVF hydration  Repeat labs in AM  Prophylaxis: SCDs, IS, C&DB, Pepcid, stool softeners  PT, OT, SLP eval and treat  DNR-CCA     Discharge disposition pending clinical course     SUBJECTIVE  Patient seen in ICU this AM. Patient stated she was doing \"good\". Patient endorsed that she had a headache but has since resolved after administration of Tylenol. Patient's only complaint is some soreness in her left lower extremity. She denied any other pain or complaints. She denied any current headaches, lightheadedness, dizziness, neck pain, back pain, chest pain, shortness of breath, abdominal pain, nausea/vomiting, and paresthesias. Tertiary exam completed. Patient was awake, alert and orient x3, in no acute distress. Patient sitting in hospital bed eating breakfast. Patient with some right-sided periorbital ecchymosis. Scattered bruising noted to left upper and lower extremity. No extremity tenderness to palpation noted. Range of motion intact. PMS intact in all 4 extremities. No signs of focal neurological deficits. Pan CT imaging with facial bones and spinal recons yesterday reviewed. MRI from yesterday reviewed with concerns for glioblastoma. Neurosurgery and intensivist following. Repeat CT head this morning with no significant change from yesterday. A.m. labs reviewed, generally unremarkable. Patient afebrile, vital signs stable. Neurosurgery noted plan for family meeting this afternoon to discuss patient's candidacy for possible surgical intervention.       Wt Readings from Last 3 Encounters:   06/28/21 135 lb 9.3 oz (61.5 kg)   06/15/21 137 lb 4 oz (62.3 kg) 05/25/21 136 lb (61.7 kg)     Temp Readings from Last 3 Encounters:   06/29/21 98.5 °F (36.9 °C) (Oral)   06/15/21 96.6 °F (35.9 °C) (Skin)   05/25/21 97.6 °F (36.4 °C) (Skin)     BP Readings from Last 3 Encounters:   06/29/21 (!) 121/57   06/15/21 112/70   05/25/21 (!) 116/58     Pulse Readings from Last 3 Encounters:   06/29/21 69   06/15/21 76   05/25/21 72       24 HR INTAKE/OUTPUT :     Intake/Output Summary (Last 24 hours) at 6/29/2021 1150  Last data filed at 6/29/2021 0900  Gross per 24 hour   Intake 3171.02 ml   Output 2100 ml   Net 1071.02 ml     ADULT DIET; Dysphagia - Soft and Bite Sized    OBJECTIVE  CURRENT VITALS BP (!) 121/57   Pulse 69   Temp 98.5 °F (36.9 °C) (Oral)   Resp 19   Ht 5' (1.524 m)   Wt 135 lb 9.3 oz (61.5 kg)   LMP  (LMP Unknown)   SpO2 94%   BMI 26.48 kg/m²   General: Alert, NAD, pleasant and cooperative exam, eating breakfast  Head: Normocephalic, mid face stable. Nares patent bilaterally, no epistaxis. Mouth clear of foreign bodies, no lacerations or abrasions. Ecchymosis to right side of forehead. Eyes: PERRL. EOMI. Right-sided periorbital ecchymosis  Neurologic: A & O x3. Following commands. PMS intact in all 4 extremities. No signs of focal neurological deficits. Neck: trachea midline. Cervical spines with minimal tenderness without step-offs, crepitus or deformity. Back: Thoracic and lumbar spine are nontender to palpation  Lungs: Clear to auscultation bilaterally. Chest Wall: Chest rise symmetrical.  Chest wall without tenderness to palpation. No crepitus, deformities, lacerations, or abrasions. Heart: RRR. Normal S1/S2. No obvious M/G/R. Abdomen:  Soft, NTTP. No guarding. Non-peritoneal.  Pelvis:  NTTP, stable to compression. Extremities: No gross deformities. PMS intact. Radial /DP/PT pulses 2+ bilaterally. Ecchymosis to the anterior lower left leg around knee and the left forearm. No extremity tenderness palpation.   Range of motion intact. Skin: Skin warm and dry. Normal for ethnicity. LABS  CBC :   Recent Labs     06/28/21  0908 06/29/21  0405   WBC 8.5 8.8   HGB 12.1 12.9   HCT 39.1 42.6   MCV 95.4 98.8    156     BMP:   Recent Labs     06/28/21 0908 06/29/21  0405    138   K 4.1 4.6    102   CO2 28 25   BUN 19 15   CREATININE 0.6 0.7     COAGS:   Recent Labs     06/28/21 0908 06/29/21  0405   PROT 6.2 6.1     Pancreas/HFP:  No results for input(s): LIPASE, AMYLASE in the last 72 hours. Recent Labs     06/28/21 0908 06/29/21  0405   AST 28 29   ALT 19 18   BILITOT 0.5 0.4   ALKPHOS 39 44     RADIOLOGY  Narrative   EXAM:  CT HEAD WITHOUT CONTRAST:       COMPARISON:    CT,[facility]  - CT HEAD WO CONTRAST  - 06/28/2021 09:18 AM EDT    CT,[facility]  - CT HEAD WO CONTRAST  - 09/08/2019 04:22 PM EDT       TECHNIQUE:  Helical noncontrast axial images from base of skull to vertex,    with coronal and sagittal reformats.       FINDINGS:       No significant change since yesterday.  There is effacement of sulci    throughout the right temporal and parietal lobes, occipital lobe and    posterior right frontal lobe, with extensive low-attenuation prominence of    white matter, extending into subcortical regions, and discontinuous    calcification in prior to occipital cortex.  Low-attenuation extends into    the right external and internal capsules.  The right lateral ventricle is    partially effaced.  There is approximately 8 mm of midline shift, not    significantly changed.  No transtentorial herniation.  Possibility of a    small amount of cortical hemorrhage, in addition to the apparent    calcifications, unchanged.  No new hemorrhage. The left hemisphere is grossly unremarkable.  No brainstem or cerebellar    abnormality is appreciated. Sinuses and mastoid air cells are clear.  No calvarial or skull base    lesion.           Impression   Extensive right hemispheric abnormality is concerning for tumor, with mass    effect.

## 2021-06-29 NOTE — CARE COORDINATION
DISCHARGE/PLANNING EVALUATION  6/29/21, 2:22 PM EDT    Reason for Referral: Discharge planning. Mental Status: Answered all questions appropriately. Decision Making: Independent with decision making  Family/Social/Home Environment: Lives at home alone. She does not have any children. She has very supportive niece's and nephews. Current Services including food security, transportation and housekeeping: Current with [facility]. Spoke with Gail Krishnan at the agency and she reports [facility] is current with nursing and aid services. She has [employer] services. Her  is Jody Betancourt. Called and left a message for her requesting a call back. Current Equipment:rolling walker, wheelchair, built in shower bench. Payment Source:Medicare and Medicaid. Concerns or Barriers to Discharge: NOne  Post acute provider list with quality measures, geographic area and applicable managed care information provided. Questions regarding selection process answered:Did not want a list at this time. Teach Back Method used with Latanya regarding care plan and discharge planning. Patient verbalized understanding of the plan of care and contribute to goal setting. Patient goals, treatment preferences and discharge plan: [facility]ika Taylor is agreeable to go to [facility] at discharge if needed. Discussed other options if they would deny. She is unsure of a back-up plan at this time. Will continue to follow. Electronically signed by MIGEL Sales on 6/29/2021 at 2:22 PM     Update 2:48pm: Spoke with Jody Stock CM. She reports Marika Taylor has an ERS and home delivered meals.

## 2021-06-29 NOTE — PLAN OF CARE
Problem: Pain:  Description: Pain management should include both nonpharmacologic and pharmacologic interventions. Goal: Pain level will decrease  Description: Pain level will decrease  Outcome: Ongoing  Note: States pain relieved after medication      Problem: Pain:  Description: Pain management should include both nonpharmacologic and pharmacologic interventions. Goal: Control of acute pain  Description: Control of acute pain  Outcome: Met This Shift  Note: Denies pain at present      Problem: Pain:  Description: Pain management should include both nonpharmacologic and pharmacologic interventions. Goal: Control of chronic pain  Description: Control of chronic pain  Outcome: Ongoing  Note: Denies pain at present      Problem: Skin Integrity:  Goal: Will show no infection signs and symptoms  Description: Will show no infection signs and symptoms  Outcome: Ongoing  Note: No new red or open areas      Problem: Skin Integrity:  Goal: Absence of new skin breakdown  Description: Absence of new skin breakdown  Outcome: Ongoing  Note: No new red or open areas      Problem: Falls - Risk of:  Goal: Will remain free from falls  Description: Will remain free from falls  Outcome: Ongoing  Note: Bed/ chair alarms remain on - call light within reach      Problem: Discharge Planning:  Goal: Discharged to appropriate level of care  Description: Discharged to appropriate level of care  6/29/2021 1620 by Emily Quevedo RN  Outcome: Ongoing  Note: Requires icu at present   6/29/2021 1421 by MIGEL Carlton  Outcome: Ongoing   Care plan reviewed with patient and family . Patient and family  verbalize understanding of the plan of care and contribute to goal setting.

## 2021-06-29 NOTE — PROGRESS NOTES
[id number] THERAPY  [facility] ICU 4D  EVALUATION    Time:   Time In: 2270  Time Out: 1215  Timed Code Treatment Minutes: 45 Minutes  Minutes: 47          Date: 2021  Patient Name: Olena Mariano,   Gender: female      MRN: [id number]  : 1933  (80 y.o.)  Referring Practitioner: Rosana Lesches, APRN - CNP  Diagnosis: Fall at Home  Additional Pertinent Hx: Olena Mariano is a 80 y.o. female who presents to the emergency department for evaluation of fall. Patient has had multiple falls over the past couple weeks, refused EMS transfer each time. Today patient had a fall, striking her right temple. Patient is on Plavix. 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 Only pain that she is complaining of is to her right temple. Per MRI: Heterogeneous 5.5 cm nodular peripherally enhancing cystic and solid mass centered at the right occipital/temporal lobe junction with prominent adjacent T2/FLAIR prolongation involving the corpus callosum, temporal, parietal and frontal lobes with prominent mass effect on the right lateral ventricle and 9 mm leftward midline shift. (High grade glioma, glioblastoma multiform).     Restrictions/Precautions:  Restrictions/Precautions: General Precautions, Fall Risk  Position Activity Restriction  Other position/activity restrictions: L neglect, R artificial eye    Subjective  Patient assessed for rehabilitation services?: Yes  Family / Caregiver Present: Yes    Subjective: Pleasant and cooperative to therapy session    Pain:  Pain Assessment  Patient Currently in Pain: Denies    Vitals: continuous monitoring of O2, HR, and intermittent BP, WFL throughout session    Social/Functional History:  Lives With: Alone  Type of Home: House  Home Layout: One level  Home Access: Level entry  Home Equipment: [employer], 4 wheeled walker, Cane, Lift chair, Alert Button   Bathroom Shower/Tub: Tub/Shower unit, Shower chair with back  [employer]: Standard (with BSC over toilet)  Bathroom Equipment: Shower chair, 3-in-1 commode    Receives Help From: Personal care attendant  ADL Assistance: Needs assistance  Homemaking Assistance: Needs assistance  Homemaking Responsibilities: Yes  Ambulation Assistance: Independent  Transfer Assistance: Independent    Active : No  Patient's  Info: family     Additional Comments: 2 hours/day M-F has an aide that assists with ADLs/IADLs. nieces live nearby and able to help at times. VISION:R eye blind, L neglect    HEARING:  WFL    COGNITION: Slow Processing, Decreased Recall, Decreased Problem Solving, Decreased Safety Awareness and Impaired Attention    RANGE OF MOTION:  Bilateral Upper Extremity:  WFL    STRENGTH:  Right Upper Extremity: WFL    SENSATION:   WFL    ADL:   Grooming: Minimal Assistance. MAX extended time required for completion of oral care with depth perception and L neglect issues noted throughout session  Lower Extremity Dressing: Dependent. socks  Toileting: Minimal Assistance.  hygiene  Toilet Transfer: Moderate Assistance. d/t low toilet height and poor placement of grab bars. BALANCE:  Sitting Balance:  Supervision. Standing Balance: Contact Guard Assistance. BED MOBILITY:  Not Tested    TRANSFERS:  Sit to Stand:  Contact Guard Assistance. Stand to Sit: Contact Guard Assistance. FUNCTIONAL MOBILITY:  Assistive Device: Rolling Walker  Assist Level:  Contact Guard Assistance. Distance: To and from bathroom  And within room, slow pace, cues to attend to L side. Activity Tolerance:  Patient tolerance of  treatment: good. Pt motivated and willing to work with OT, but requiring extended time and redirection throughout session. Assessment:    Pt s/p admission for fall at home. Pt exhibiting deficits detailed below, requiring additional OT intervention to restore PLOF.  Pt now requiring assist for ADLs, IADLs, mobility, t/fs, and standing balance. Pt with significant increase in burden of care. Without skilled OT intervention pt at risk for functional decline, increased falls, and readmission to hospital.    Performance deficits / Impairments: Decreased functional mobility , Decreased safe awareness, Decreased balance, Decreased ADL status, Decreased endurance, Decreased strength, Decreased high-level IADLs  Prognosis: Good  REQUIRES OT FOLLOW UP: Yes    Treatment Initiated: Treatment and education initiated within context of evaluation. Evaluation time included review of current medical information, gathering information related to past medical, social and functional history, completion of standardized testing, formal and informal observation of tasks, assessment of data and development of plan of care and goals. Treatment time included skilled education and facilitation of tasks to increase safety and independence with ADL's for improved functional independence and quality of life. Discharge Recommendations:  Continue to assess pending progress, IP Rehab, Patient would benefit from continued therapy after discharge    Patient Education:  OT Education: OT Role, Plan of Care, Precautions, ADL Adaptive Strategies, Transfer Training, Family Education    Equipment Recommendations: Other: continue to assess needs    Plan:  Times per week: 6x  Current Treatment Recommendations: Strengthening, Balance Training, Neuromuscular Re-education, Functional Mobility Training, Endurance Training, Home Management Training, Patient/Caregiver Education & Training, Self-Care / ADL, Safety Education & Training. See long-term goal time frame for expected duration of plan of care. If no long-term goals established, a short length of stay is anticipated.     Goals:     Short term goals  Time Frame for Short term goals: by discharge  Short term goal 1: Pt will complete visual scanning to L side with min cues during ADLs to decrease fall risk during ADLs.  Short term goal 2: Pt will complete LB ADL with Morales and adaptive techs to increase indep with self care. Short term goal 3: Pt will complete dynamic standing task with B hand release and SBA x 4 min to increase balance required for ADL completion. Short term goal 4: Pt will complete functional mobility to/from BR with AD and SBA to increase indep with self care. Following session, patient left in safe position with all fall risk precautions in place.

## 2021-06-30 ENCOUNTER — ANESTHESIA EVENT (OUTPATIENT)
Dept: OPERATING ROOM | Age: 86
DRG: 025 | End: 2021-06-30
Payer: MEDICARE

## 2021-06-30 LAB
EKG ATRIAL RATE: 62 BPM
EKG P AXIS: 55 DEGREES
EKG P-R INTERVAL: 154 MS
EKG Q-T INTERVAL: 422 MS
EKG QRS DURATION: 84 MS
EKG QTC CALCULATION (BAZETT): 428 MS
EKG R AXIS: 35 DEGREES
EKG T AXIS: 137 DEGREES
EKG VENTRICULAR RATE: 62 BPM
GLUCOSE BLD-MCNC: 102 MG/DL (ref 70–108)
GLUCOSE BLD-MCNC: 138 MG/DL (ref 70–108)
GLUCOSE BLD-MCNC: 143 MG/DL (ref 70–108)
MRSA SCREEN: NORMAL

## 2021-06-30 PROCEDURE — 99233 SBSQ HOSP IP/OBS HIGH 50: CPT | Performed by: INTERNAL MEDICINE

## 2021-06-30 PROCEDURE — 6360000002 HC RX W HCPCS: Performed by: NURSE PRACTITIONER

## 2021-06-30 PROCEDURE — 97129 THER IVNTJ 1ST 15 MIN: CPT

## 2021-06-30 PROCEDURE — 94761 N-INVAS EAR/PLS OXIMETRY MLT: CPT

## 2021-06-30 PROCEDURE — 97530 THERAPEUTIC ACTIVITIES: CPT

## 2021-06-30 PROCEDURE — 2140000000 HC CCU INTERMEDIATE R&B

## 2021-06-30 PROCEDURE — 2580000003 HC RX 258: Performed by: NURSE PRACTITIONER

## 2021-06-30 PROCEDURE — 93005 ELECTROCARDIOGRAM TRACING: CPT | Performed by: NURSE PRACTITIONER

## 2021-06-30 PROCEDURE — 99232 SBSQ HOSP IP/OBS MODERATE 35: CPT | Performed by: SURGERY

## 2021-06-30 PROCEDURE — 99233 SBSQ HOSP IP/OBS HIGH 50: CPT | Performed by: NEUROLOGICAL SURGERY

## 2021-06-30 PROCEDURE — 97110 THERAPEUTIC EXERCISES: CPT

## 2021-06-30 PROCEDURE — APPSS60 APP SPLIT SHARED TIME 46-60 MINUTES: Performed by: NURSE PRACTITIONER

## 2021-06-30 PROCEDURE — 82948 REAGENT STRIP/BLOOD GLUCOSE: CPT

## 2021-06-30 PROCEDURE — 92526 ORAL FUNCTION THERAPY: CPT

## 2021-06-30 PROCEDURE — 6370000000 HC RX 637 (ALT 250 FOR IP): Performed by: NURSE PRACTITIONER

## 2021-06-30 PROCEDURE — APPSS30 APP SPLIT SHARED TIME 16-30 MINUTES: Performed by: PHYSICIAN ASSISTANT

## 2021-06-30 RX ADMIN — CETIRIZINE HYDROCHLORIDE 10 MG: 10 TABLET, FILM COATED ORAL at 09:17

## 2021-06-30 RX ADMIN — SERTRALINE 50 MG: 50 TABLET, FILM COATED ORAL at 09:16

## 2021-06-30 RX ADMIN — POLYETHYLENE GLYCOL (3350) 17 G: 17 POWDER, FOR SOLUTION ORAL at 09:17

## 2021-06-30 RX ADMIN — LEVOTHYROXINE SODIUM 100 MCG: 0.1 TABLET ORAL at 06:40

## 2021-06-30 RX ADMIN — LEVETIRACETAM 500 MG: 100 INJECTION INTRAVENOUS at 13:39

## 2021-06-30 RX ADMIN — CALCIUM CARBONATE-VITAMIN D TAB 500 MG-200 UNIT 1 TABLET: 500-200 TAB at 20:52

## 2021-06-30 RX ADMIN — LEVETIRACETAM 500 MG: 100 INJECTION INTRAVENOUS at 23:49

## 2021-06-30 RX ADMIN — Medication 1 TABLET: at 09:17

## 2021-06-30 RX ADMIN — DOCUSATE SODIUM 100 MG: 100 CAPSULE, LIQUID FILLED ORAL at 09:17

## 2021-06-30 RX ADMIN — ISOSORBIDE MONONITRATE 30 MG: 30 TABLET ORAL at 09:17

## 2021-06-30 RX ADMIN — ATORVASTATIN CALCIUM 10 MG: 10 TABLET, FILM COATED ORAL at 20:52

## 2021-06-30 RX ADMIN — SODIUM CHLORIDE, PRESERVATIVE FREE 10 ML: 5 INJECTION INTRAVENOUS at 20:53

## 2021-06-30 RX ADMIN — SODIUM CHLORIDE, PRESERVATIVE FREE 10 ML: 5 INJECTION INTRAVENOUS at 09:18

## 2021-06-30 RX ADMIN — SERTRALINE 50 MG: 50 TABLET, FILM COATED ORAL at 20:52

## 2021-06-30 RX ADMIN — HYDRALAZINE HYDROCHLORIDE 10 MG: 20 INJECTION, SOLUTION INTRAMUSCULAR; INTRAVENOUS at 03:04

## 2021-06-30 RX ADMIN — CALCIUM CARBONATE-VITAMIN D TAB 500 MG-200 UNIT 1 TABLET: 500-200 TAB at 09:17

## 2021-06-30 RX ADMIN — PANTOPRAZOLE SODIUM 40 MG: 40 TABLET, DELAYED RELEASE ORAL at 06:40

## 2021-06-30 RX ADMIN — DOCUSATE SODIUM 100 MG: 100 CAPSULE, LIQUID FILLED ORAL at 20:52

## 2021-06-30 RX ADMIN — DEXAMETHASONE SODIUM PHOSPHATE 4 MG: 4 INJECTION, SOLUTION INTRA-ARTICULAR; INTRALESIONAL; INTRAMUSCULAR; INTRAVENOUS; SOFT TISSUE at 18:07

## 2021-06-30 ASSESSMENT — PAIN SCALES - GENERAL: PAINLEVEL_OUTOF10: 0

## 2021-06-30 NOTE — PROGRESS NOTES
Addendum by Dr. Henny Henderson MD:     I have seen and examined the patient independently. Face to face evaluation and examination was performed. The below evaluation and note have been reviewed. Labs and radiographs were reviewed. I Have discussed with Neurosurgery PA about this patient in detail. The below assessment and plan have been reviewed. Please see my modifications mentioned below. My additional comments and modifications: This is an 51-year-old female with a newly diagnosed huge brain mass assoiciated with significant midline shift       In today evaluation and visit:     · No acute event over the night. · Patient is awake and oriented x3. Her GCS is 15/15 today. Otherwise, there is no change in patient physical exam compared with yesterday. Yesterday  brain CT showed stable exam:       Extensive right hemispheric abnormality is concerning for tumor, with mass    effect.  No significant change since yesterday            Patient's recommendation and treatment plan from neurosurgical perspective at this time:        · Continue medical management per ICU team.  · Seizure precaution (Keppra 500 mg every 12h). · Dexamethasone 4 mg every 6 IV. · New take TEG and platelet mapping tomorrow. · Neurosurgery to follow. Decision making:     - Given the finding of patient MRI (isolated newly diagnosed right intracerebral intra parenchymal lesion that reaching the surface of the brain), the findings of her C/A/P CT,  the current neurological exam and her  current Karnofsky performance score (at least 70%). A surgical  surgical intervention mainly the form of right craniotomy for surgical resection/debulking of right sided intracerebral tumor with 2 main goals;   1- obtaining  a tissue for definitive diagnosis. 2- Maximum Safe resection /debulking lesion. 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 -On other hand, patient's age group works against surgical intervention.    -Today I had long discussion with patient and her family I  family all the treatment options and the recommended surgical treatment option (in front of ICU nurse practitioner, George Cruz). I discussed with them all the risks and the benefits. The expected realistic surgical outcome. - I discussed with the patient and her family the possible complication of surgery including excessive blood loss requiring transfusion, infection that may become meningitis, problem with heart, lung and kidney as a result of general anesthesia such as heart attack, pneumonia, kidney shutdown and stroke. We also discussed the possible complication of the operations in and around the brain such as, death paralysis problems with vision problems with speech, seizures, weakness on one side or the other of the body postoperative meningitis postoperative development of a blood clot that may require another operation, leakage of fluid from the wound that may require another operation, deafness, problems with swallowing and with the ability to close the eye (that may be temporary or permanent). - I told patient there is no guarantee to achieve a total resection of the tumor. I told the patient that based of final results of the pathology he  may need another treatment modalities. -I emphasized for the patient again that the main goal of surgery is biopsy/debulking as there is no guarantee to achieve a total resection of the tumor (based on the tumor location and anatomical configuration). All questions and concerns were addressed and answered. Patient and her family elected to proceed with the recommended surgical treatment option.  - The plan now for  Nicolás Dennis is is to schedule her for surgery this Friday. - The Case and treatment plan were reviewed with ICU team and with patient's nurse.         Muna Andersen MD         Neurosurgery Progress Note    Patient:  Lyndsey Doty      Unit/Bed:4D-11/011-A    YOB: 1933    MRN: [id number]     Acct: [de-identified]     Admit date: 6/28/2021    Chief Complaint   Patient presents with    Trauma    Fall       Patient Seen, Chart, Physician notes, Labs, Radiology studies reviewed. Subjective: The patient is seen and evaluated in the ICU setting with evaluation exam findings reviewed and discussed with Dr. Vadim Chapa, with nursing and with critical care. She is resting comfortably with pain well-controlled. Past, Family, Social History unchanged from admission. Diet:  ADULT DIET; Dysphagia - Soft and Bite Sized    Medications:  Scheduled Meds:   calcium-vitamin D  1 tablet Oral BID    isosorbide mononitrate  30 mg Oral Daily    levothyroxine  100 mcg Oral Daily    cetirizine  10 mg Oral Daily    multivitamin  1 tablet Oral Daily    pantoprazole  40 mg Oral QAM AC    sertraline  50 mg Oral BID    atorvastatin  10 mg Oral Nightly    sodium chloride flush  5-40 mL Intravenous 2 times per day    polyethylene glycol  17 g Oral Daily    lidocaine  2 patch Topical Daily    levetiracetam  500 mg Intravenous Q12H    insulin lispro  0-6 Units Subcutaneous TID WC    insulin lispro  0-3 Units Subcutaneous Nightly    docusate sodium  100 mg Oral BID    dexamethasone  4 mg Intravenous Daily     Continuous Infusions:   sodium chloride      sodium chloride 75 mL/hr at 06/29/21 1639    sodium chloride      dextrose       PRN Meds:hydrALAZINE, traZODone, sodium chloride flush, sodium chloride, ondansetron **OR** ondansetron, fleet, acetaminophen, sodium chloride, glucose, dextrose, glucagon (rDNA), dextrose    Objective: Patient is lying in bed with head of the bed elevated 30 degrees and is comfortable with pain well-controlled and remained stable and intact her baseline with no additional changes noted the chart overnight.     Vitals: BP (!) 150/50   Pulse 63   Temp 98.4 °F (36.9 °C) (Oral)   Resp 18   Ht 5' (1.524 m)   Wt 130 lb 11.7 oz (59.3 kg)   LMP  (LMP Unknown)   SpO2 96%   BMI 25.53 kg/m²   Physical Exam:  Alert and attentive. Language appropriate, with no aphasia. Pupils equal.  Facial strength symmetric. Physical Exam  Remained stable and intact her baseline on exam today with no additional changes having been noted to the patient chart overnight. ROS:  Review of Systems  Mild persistent headache noted absent any additional complaints. Patient denies chest pain, shortness of breath, nausea or vomiting or significant visual disturbances. 24 hour intake/output:    Intake/Output Summary (Last 24 hours) at 6/30/2021 0953  Last data filed at 6/30/2021 0457  Gross per 24 hour   Intake 2914 ml   Output 1550 ml   Net 1364 ml     Last 3 weights: Wt Readings from Last 3 Encounters:   06/30/21 130 lb 11.7 oz (59.3 kg)   06/15/21 137 lb 4 oz (62.3 kg)   05/25/21 136 lb (61.7 kg)         CBC:   Recent Labs     06/28/21  0908 06/29/21  0405   WBC 8.5 8.8   HGB 12.1 12.9    156     BMP:    Recent Labs     06/28/21  0908 06/29/21  0405    138   K 4.1 4.6    102   CO2 28 25   BUN 19 15   CREATININE 0.6 0.7   GLUCOSE 81 106     Calcium:  Recent Labs     06/29/21  0405   CALCIUM 9.0     Magnesium:No results for input(s): MG in the last 72 hours. Glucose:  Recent Labs     06/29/21  1114 06/29/21  1642 06/29/21  2018   POCGLU 93 91 147*     HgbA1C: No results for input(s): LABA1C in the last 72 hours. Lipids: No results for input(s): CHOL, TRIG, HDL, LDLCALC in the last 72 hours.     Invalid input(s): LDL    Radiology reports as per the Radiologist  Radiology: CT HEAD WO CONTRAST    Result Date: 6/29/2021  EXAM:  CT HEAD WITHOUT CONTRAST: COMPARISON:  CT,[facility]  - CT HEAD WO CONTRAST  - 06/28/2021 09:18 AM EDT  CT,SR  - CT HEAD WO CONTRAST  - 09/08/2019 04:22 PM EDT TECHNIQUE:  Helical noncontrast axial images from base of skull to vertex, with coronal and sagittal reformats. FINDINGS: No significant change since yesterday. There is effacement of sulci throughout the right temporal and parietal lobes, occipital lobe and posterior right frontal lobe, with extensive low-attenuation prominence of white matter, extending into subcortical regions, and discontinuous calcification in prior to occipital cortex. Low-attenuation extends into the right external and internal capsules. The right lateral ventricle is partially effaced. There is approximately 8 mm of midline shift, not significantly changed. No transtentorial herniation. Possibility of a small amount of cortical hemorrhage, in addition to the apparent calcifications, unchanged. No new hemorrhage. The left hemisphere is grossly unremarkable. No brainstem or cerebellar abnormality is appreciated. Sinuses and mastoid air cells are clear. No calvarial or skull base lesion. Extensive right hemispheric abnormality is concerning for tumor, with mass effect. No significant change since yesterday. Less likely is recent infarct. This document has been electronically signed by: Jett Pressley MD on 06/29/2021 08:15 AM All CTs at this facility use dose modulation techniques and iterative reconstructions, and/or weight-based dosing when appropriate to reduce radiation to a low as reasonably achievable. CT HEAD WO CONTRAST    Result Date: 6/28/2021  PROCEDURE: CT HEAD WO CONTRAST CLINICAL INFORMATION: fal . COMPARISON: 9/8/2019 TECHNIQUE: 2-D multiplanar noncontrast images of the brain All CT scans at this facility use dose modulation, iterative reconstruction, and/or weight-based dosing when appropriate to reduce radiation dose to as low as reasonably achievable. FINDINGS: There is heterogeneous low-density mass in the right parieto-occipital lobe image 21 with a small amount of parenchymal hemorrhage. There is small amount of subarachnoid hemorrhage associated with this.  There is extensive white matter edema involving most of the right hemisphere extending into the temporal lobe. This results in compression of the right lateral ventricle and midline shift of 8 mm. There is slight mass effect on the right very subtle mass effect on the right basal cistern. Quadrigeminal cistern is intact. Calvarium is intact. Right globe prosthesis is present. Visualized paranasal sinuses and mastoid air cells are clear     Mixed density mass in the right parietal occipital lobe with extensive surrounding white matter edema and minimal parenchymal and subarachnoid hemorrhage. There is midline shift 8 mm left to right. Compression of the right lateral ventricle. Minimal mass effect on the basilar cistern. This was called to Dr. Ez Chapman at time of exam. **This report has been created using voice recognition software. It may contain minor errors which are inherent in voice recognition technology. ** Final report electronically signed by Dr. Melisa Buckner on 6/28/2021 9:56 AM    CT FACIAL BONES WO CONTRAST    Result Date: 6/28/2021  PROCEDURE: CT FACIAL BONES WO CONTRAST CLINICAL INFORMATION: tr. Fall with right-sided pain and bruising. COMPARISON: CT head dated 9/8/2019 and CT facial bones dated 8/20/2017. TECHNIQUE: Helical CT of the face with sagittal and coronal reconstructions. All CT scans at this facility use dose modulation, iterative reconstruction, and/or weight-based dosing when appropriate to reduce radiation dose to as low as reasonably achievable. FINDINGS:  Maxillofacial bones are normally aligned without visualized fracture. The mandible appears intact. Patient is edentulous. There is rightward deviation of the nasal septum, stable compared to prior exams. There is a right-sided globe prosthesis, stable compared to prior exam. Paranasal sinuses are clear. Mastoid air cells and middle ears are clear. No evidence of acute osseous injury of the face. **This report has been created using voice recognition software.  It may contain minor errors which are inherent in voice recognition technology. ** Final report electronically signed by Dr. Libby Perez MD on 6/28/2021 10:04 AM    CT CHEST W CONTRAST    Result Date: 6/28/2021  PROCEDURE: CT CHEST W CONTRAST CLINICAL INFORMATION: tr COMPARISON: 8/26/2016 TECHNIQUE:  Axial CT images were obtained through the chest following the intravenous and demonstration of 80 mL Isovue 370 contrast. Coronal and sagittal reformatted images were rendered. All CT scans at this facility use dose modulation, iterative reconstruction, and/or weight-based dosing when appropriate to reduce radiation dose to as low as reasonably achievable. FINDINGS: The central airways appear patent. There is a 3 mm pulmonary nodule within the left lower lobe demonstrated on axial image 58. This appears unchanged from 8/26/2016 and likely represents a poorly calcified granuloma given its stability. No pneumothorax or pleural effusion is seen. No mediastinal hematoma is demonstrated. CT of the abdomen and pelvis are reported separately. No acute osseous findings are demonstrated. 1. No acute traumatic intrathoracic findings. **This report has been created using voice recognition software. It may contain minor errors which are inherent in voice recognition technology. ** Final report electronically signed by Dr. King Martinez on 6/28/2021 10:00 AM    CT CERVICAL SPINE WO CONTRAST    Result Date: 6/28/2021  PROCEDURE: CT CERVICAL SPINE WO CONTRAST CLINICAL INFORMATION: tr. Right-sided neck pain and bruising after fall. COMPARISON: CT cervical spine dated 9/8/2019. TECHNIQUE: 3 mm noncontrast axial images were obtained through the cervical spine with sagittal and coronal reconstructions. All CT scans at this facility use dose modulation, iterative reconstruction, and/or weight-based dosing when appropriate to reduce radiation dose to as low as reasonably achievable.  FINDINGS: There is a dextrocurvature of the cervical spine which is likely positional.  There is grade 1 anterolisthesis of C7 relative to T1 on the basis of degenerative change. There is stable mild irregularity of the superior endplate of C7 likely associated with Schmorl's node. There is otherwise anatomic vertebral body height and alignment. No fracture of the cervical vertebral column is identified. The craniocervical junction appears intact. No paraspinal or epidural fluid collection is identified. Paraspinal soft tissues are grossly unremarkable. There are stable mild degenerative changes of the cervical spine. No evidence of acute osseous injury of the cervical spine. **This report has been created using voice recognition software. It may contain minor errors which are inherent in voice recognition technology. ** Final report electronically signed by Dr. Austin Ruiz MD on 6/28/2021 9:54 AM    CT ABDOMEN PELVIS W IV CONTRAST Additional Contrast? None    Result Date: 6/28/2021  PROCEDURE: CT ABDOMEN PELVIS W IV CONTRAST CLINICAL INFORMATION: tr . COMPARISON: 8/12/2020 TECHNIQUE: 2-D multiplanar postcontrast images of the abdomen and pelvis Isovue-370 IV contrast. All CT scans at this facility use dose modulation, iterative reconstruction, and/or weight-based dosing when appropriate to reduce radiation dose to as low as reasonably achievable. FINDINGS: No solid organ injury. Liver and spleen are within normal limits. There may be early arterial enhancement causing heterogeneous enhancement of the spleen. Appearance of the liver also suggests early arterial enhancement. Pancreas is unremarkable. Adrenals are normal. Stable cyst lower pole left kidney. Kidneys enhance symmetrically and appear otherwise normal. Heavy atherosclerosis of the aorta. Right common iliac artery stent. No adenopathy. Left anterior abdominal hernia containing colon and small bowel. No obstruction at this time. Postsurgical changes right colon near the cecum.  Urinary bladder is distended. Uterus is present. Postsurgical changes rectosigmoid colon. Bones 9 mm anterolisthesis L5 on S1. Degenerative disc disease. No acute or suspicious bone lesions identified. Remote healed fracture of the right superior and inferior pubic rami. Multiple chronic findings. No acute abdominal or pelvic abnormality. **This report has been created using voice recognition software. It may contain minor errors which are inherent in voice recognition technology. ** Final report electronically signed by Dr. Melinda Porter on 6/28/2021 10:03 AM    US LIVER    Result Date: 6/29/2021  Ultrasound of the right upper quadrant Comparison:  US,SR  - US LIVER  - 05/26/2020 10:50 AM EDT Findings: Normal liver. No evidence for mass. Liver measures 14.4 cm. Status post cholecystectomy. Normal common bile duct at 5 mm. Normal visualized portion of the pancreas. Mild right hydronephrosis. No renal mass. Impression: Mild right hydronephrosis. This document has been electronically signed by: Ez Prescott MD on 06/29/2021 01:28 AM    CT LUMBAR RECONSTRUCTION WO POST PROCESS    Result Date: 6/28/2021  PROCEDURE: CT LUMBAR RECONSTRUCTION WO POST PROCESS CLINICAL INFORMATION: fall. Right-sided pain and bruising. COMPARISON: Cervical spine radiographs dated 3/16/2021. TECHNIQUE: 3 mm axial, sagittal and coronal CT images were reconstructed through the lumbar spine. These are reformatted from the patient's abdomen and pelvis CT. IV contrast is present. All CT scans at this facility use dose modulation, iterative reconstruction, and/or weight-based dosing when appropriate to reduce radiation dose to as low as reasonably achievable. FINDINGS: There are chronic bilateral pars defects at L5 with grade 2 anterolisthesis of L5 relative to S1, stable compared to prior radiographs. There is grade 1 retrolisthesis of L1 relative to L2 on the basis of degenerative change, also stable compared to prior radiographs.  There is a minimal levocurvature of the lumbar spine, unchanged compared to prior contrast. There is otherwise anatomic vertebral body height and alignment. No acute fracture of the lumbar vertebral column is identified. At T12-L1 there is a minimal disc bulge without significant spinal canal or neuroforaminal stenosis. At L1-2 there is partial uncovering the disc without significant spinal canal stenosis and mild left and mild-to-moderate right neural foraminal stenosis in association with facet hypertrophy. At L2-3 there is a shallow disc bulge which contributes to mild spinal canal stenosis and mild left and moderate right neuroforaminal stenosis in association with facet hypertrophy. At L3-4 there is a shallow disc bulge without significant spinal canal stenosis. There is mild bilateral foraminal stenosis in association with facet hypertrophy. At L4-5 there is no significant spinal canal stenosis. There is mild to moderate bilateral foraminal stenosis in association with shallow disc bulge and facet hypertrophy. At L5-S1 there is partial uncovering of the disc. There is moderate to severe bilateral neural foraminal stenosis from flattening of the neural foramina and uncovering of the disc associated with the anterolisthesis. 1. No evidence of acute osseous injury of the lumbar spine. 2. 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 3. Multilevel degenerative changes elsewhere. Please refer to the body of the report for segmental analysis. **This report has been created using voice recognition software. It may contain minor errors which are inherent in voice recognition technology. ** Final report electronically signed by Dr. Zion Dorado MD on 6/28/2021 10:00 AM    CT THORACIC RECONSTRUCTION WO POST PROCESS    Result Date: 6/28/2021  PROCEDURE: CT THORACIC RECONSTRUCTION WO POST PROCESS CLINICAL INFORMATION: Trauma . COMPARISON: No prior exam TECHNIQUE: 2-D multiplanar reconstructed images of the thoracic spine. All CT scans at this facility use dose modulation, iterative reconstruction, and/or weight-based dosing when appropriate to reduce radiation dose to as low as reasonably achievable. FINDINGS: Increased thoracic kyphosis. No acute fracture or acute bony malalignment. Diffuse osteopenia. Diffuse degenerative disc disease. No central canal encroachment. Dextroscoliosis. No foraminal encroachment or paraspinal soft tissue abnormality. No acute abnormality of the thoracic spine. **This report has been created using voice recognition software. It may contain minor errors which are inherent in voice recognition technology. ** Final report electronically signed by Dr. Melanie Gonzales on 6/28/2021 10:06 AM    MRI BRAIN W WO CONTRAST    Result Date: 6/28/2021  PROCEDURE: MRI BRAIN W WO CONTRAST INDICATION:brain mass. Fall today. COMPARISON: CT head from the same date and MR brain dated 10/23/2009. TECHNIQUE: Multiplanar and multiple spin echo T1 and T2-weighted images were obtained through the brain before and after the administration of intravenous contrast. 15 mL ProHance was injected in the right AC. FINDINGS: There is an intra-axial multilobulated mass centered at the right occipital/temporal lobe junction measuring 5.5 x 3.9 cm in greatest axial dimensions. There are lobulated low T1, hyperintense T2 signal components centrally. There is thickened and nodular irregular peripheral enhancement following contrast administration. There are nodular enhancing septations centrally and inferiorly within the lesion. No other enhancing lesions are identified within the parenchyma. There is prominent T2/FLAIR prolongation adjacent to the lesion extending anteriorly into the temporal, parietal and frontal lobes and in the right basal ganglia, as well as the splenium of the corpus callosum.  There is prominent mass effect on the occipital horn of the right lateral ventricle with near complete effacement. There is 9 millimeters leftward midline shift at the level of the foramen of [place]. There are moderate confluent and patchy areas of T2/FLAIR prolongation in the periventricular, subcortical deep white matter elsewhere. No focal areas of restricted diffusion are present. The major vascular flow voids appear patent. There is artifact from patient's right ocular implant. Patient is status post lens extraction on the left. Orbits are otherwise unremarkable. Paranasal sinuses and mastoid air cells are clear. Heterogeneous 5.5 cm nodular peripherally enhancing cystic and solid mass centered at the right occipital/temporal lobe junction with prominent adjacent T2/FLAIR prolongation involving the corpus callosum, temporal, parietal and frontal lobes with prominent mass effect on the right lateral ventricle and 9 mm leftward midline shift. 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 **This report has been created using voice recognition software. It may contain minor errors which are inherent in voice recognition technology. ** Final report electronically signed by Dr. Zion Dorado MD on 6/28/2021 12:45 PM    A/P: S/P patient is seen and evaluated in the ICU setting with evaluation exam findings reviewed and discussed with Dr. Yesenia Miles, with nursing and with critical care. Patient is resting comfortably and remained stable and intact her baseline with no additional changes noted the patient chart overnight. Family discussions are ongoing involving likely surgical intervention tentatively for Friday. The consensus was reached involving the surgical option, necessary clearances will be obtained with consent for the procedure and scheduling to follow.  Neurosurgery to follow    Electronically signed by Rubia Hernandez PA-C on 6/30/2021 at 9:53 AM

## 2021-06-30 NOTE — PROGRESS NOTES
[address] [place] THERAPY  [facility] CCU 3A  DAILY NOTE    TIME   SLP Individual Minutes  Time In: 8856  Time Out: 0499  Minutes: 34  Timed Code Treatment Minutes: 0 Minutes   Dysphagia therapy: 34 minutes      Date: 2021  Patient Name: Eamon Hobbs      CSN: [id number]   : 1933  (80 y.o.)  Gender: female   Referring Physician:  Charbel Robles APRN - CNP  Diagnosis: Fall at home, initial encounter  Secondary Diagnosis: Dysphagia, cognitive deficits  Precautions: Fall risk, aspiration precautions, low stimulation  Current Diet: Advanced to regular diet and thin liquids 21   Swallowing Strategies: Full Upright Position, Small Bite/Sip, Pulmonary Monitoring, Oral Care after all Meals, Interm[place]net supervision, Alternate Solids and Liquids, Limit Distractions and Monitor for Fatigue       Date of Last MBS/FEES: Not Applicable    Pain:  No pain reported. Subjective:  Pt seen upright in recliner. Advanced meal trial brought in by ST to assess pt ability to progress oral diet. No family present. Pt pleasant and cooperative. Television on; however, muted in order to minimize distractions during meal.     Short-Term Goals:  SHORT TERM GOAL #1:  Goal 1: Pt will safely consume soft and bite size diet wtih thin liquids (advanced texture trials as appropriate; direct 1:1 assistance) without overt s/s aspiration or pulmonary compromise to assist with nutrition/hydration measures. GOAL MET   NEW GOAL: Pt will safely consume a regular diet and thin liquids (intermittent supervision) without overt s/s of aspiration or pulmonary compromise to meet nutritional/hydration measures. INTERVENTIONS: Skilled dysphagia therapy via direct advanced meal intervention. Pt with multiple trials of pizza, apple crisp, salad and thin liquids by cup. Pt presents with oropharyngeal swallow function that appears to be [facility] Pt with adequate bolus breakdown, formation and AP transit.  Effective oral clearance with extra time and liquid wash. Initially pt demonstrating HIGH impulsivity with large bolus intake and fast rate of consumption. Improved with mod verbal cues for skilled swallowing strategies (I.e. small bites, slow rate, alternating solids/liquids). Consumption of above PO trials without overt difficulty and/or overt s/s of aspiration. Pt with what appeared to be a timely pharyngeal swallow trigger and likely functional oral bolus control/containment. Unable to fully r/o pharyngeal dysfunction and/or silent airway invasion events in it's entirety without completion of formal instrumentation. Pt's swallow physiology does appear appropriate to support PO consumption without distress. Recommendations for advancement to regular diet and thin liquids. ST to follow up with skilled dietary monitor. May require reassessment with plans for neuro surgery on 7/2. SHORT TERM GOAL #2:  Goal 2: Pt will complete functional immediate/delayed recall tasks (inclusion of compensatory strategies) with 60% accuracy, mod cues to improve retention of pertinent information. INTERVENTIONS: DNT d/t focus on other goals   AM session: Orientation:   Month: indep  Date: Following logical cueing, pt off x1 date  Year: indep  [place]: FO2  Location: 2/2 indep  Time: FO2    Functional carryover of 5 item grocery list:  Immediate recall: 4/5 indep, 1/5 unable to elicit   Repetition: 4/5 indep, 1/5 min cues  10+ minute delay: 3/5 indep, 2/5 min cues     SHORT TERM GOAL #3:  Goal 3: Pt will complete problem solving, verbal/visual reasoning, and executive functioning tasks (time, basic money/math/medications) with 60% accuracy, mod cues to improve contribution within ADLs/IADLs. INTERVENTIONS: DNT d/t focus on additional STGs     SHORT TERM GOAL #4:  Goal 4: Pt will complete sequencing and thought organization tasks with 70% accuracy, mod cues to improve mental flexibility and processing speed.   INTERVENTIONS: DNT d/t focus on additional STGs    SHORT TERM GOAL #5:  Goal 5: Pt will complete selective attention tasks (focus on the left) with no more than 5 errors until task completion to optimize reading comprehension and visual scanning requried for ADLs. INTERVENTIONS: DNT d/t focus on other goals  AM session: Oral reading of written grocery list: 1/2 indep, 1/2 unable to elicit with need for max cues  *presence of L neglect present  *decreased visual acuity     SHORT TERM GOAL #6:  Goal 6: Pt and family will exhibit return demonstration for implementation of low stimulation guidelines/protocol given concerns within acute intracranial findings. INTERVENTIONS: Pt with lights turned on and television on upon ST arrival. Volume muted while consuming meal in order to reduce distractions. Pt exhibiting appropriate attention to task. No s/s of overstimulation. Will continue to monitor and provide follow up education on low stimulation guidelines as needed. Long-Term Goals:  No LTG established given short ELOS     EDUCATION:  Learner: Patient  Education:  Reviewed ST goals and Plan of Care and Reviewed recommendations for follow-up  Evaluation of Education: [place] understanding, Demonstrates with assistance and Needs further instruction    ASSESSMENT/PLAN:  Activity Tolerance:  Patient tolerance of  treatment: good. Cooperative with ST and POC. Assessment/Plan: Patient progressing toward established goals. Continues to require skilled care of licensed speech pathologist to progress toward achievement of established goals and plan of care. .     Plan for Next Session: cognitive therapy        Martha Walters M.S. [id number] [place] [address] 6/30/2021

## 2021-06-30 NOTE — PLAN OF CARE
Problem: Pain:  Goal: Control of acute pain  Description: Control of acute pain  6/29/2021 2136 by Comfort Clinton RN  Outcome: Met This Shift     Problem: Pain:  Goal: Control of chronic pain  Description: Control of chronic pain  6/29/2021 2136 by Comfort Clinton RN  Outcome: Met This Shift     Problem: Skin Integrity:  Goal: Absence of new skin breakdown  Description: Absence of new skin breakdown  6/29/2021 2136 by Comfort Clinton RN  Outcome: Met This Shift  Note: Pt at risk for skin breakdown r/t tubes and lines present. Pt remains free of new skin breakdown at this time. Pt able to turn self. Skin assessed with each skin assessment. Problem: Falls - Risk of:  Goal: Will remain free from falls  Description: Will remain free from falls  6/29/2021 2136 by Comfort Clinton RN  Outcome: Met This Shift  Note: Pt at risk for falls r/t tubes and lines present. Pt remains free of falls t/o this shift. Bed alarm is on and functioning properly. Call light is within reach. Problem: Pain:  Goal: Pain level will decrease  Description: Pain level will decrease  6/29/2021 2136 by Comfort Clinton RN  Outcome: Ongoing  Note: Pt denies any pain at this time. Will continue to assess with each assessment. Pain medication given as indicated per order. Problem: Skin Integrity:  Goal: Will show no infection signs and symptoms  Description: Will show no infection signs and symptoms  6/29/2021 2136 by Comfort Clinton RN  Outcome: Ongoing     Problem: Discharge Planning:  Goal: Discharged to appropriate level of care  Description: Discharged to appropriate level of care  6/29/2021 2136 by Comfort Clinton RN  Outcome: Ongoing  Note: Pt remains in ICU at this time. Will continue to assess. Care plan reviewed with patient and Family. Patient and Family verbalize understanding of the plan of care and contribute to goal setting.

## 2021-06-30 NOTE — PROGRESS NOTES
[address] [facility] PHYSICAL THERAPY  DAILY NOTE  [facility] ICU 4D - 4D-11/011-A     Time In: 9451  Time Out: 0948  Timed Code Treatment Minutes: 25 Minutes  Minutes: 25          Date: 2021  Patient Name: James Montemayor,  Gender:  female        MRN: [id number]  : 1933  (80 y.o.)     Referring Practitioner: PAMELA Hook CNP  Diagnosis: Fall at home  Additional Pertinent Hx: Malcom Chinchilla is an 80year old female presenting to the Emergency Department via EMS for evaluation of potential injuries sustained when she fell in her home this morning around 5 am.  She reports that she was going back to her bed from her bathroom when her legs became weak and she fell. She is not sure if she hit her head on the nightstand but she states that the lamp that was on the nightstand fell on top of her, hitting the right side of her forehead/eye. She denies loss of consciousness. She was found by her home health aide around 0800 as she was not able to get herself up after falling. EMS reports that they have been to [facility] home multiple times over the last few weeks for lift assist but she refuses to go to the hospital for evaluation. Malcom Chinchilla reports that her legs have been progressively becoming more weak. She denies headache, neck pain, chest pain, abdominal pain. She does admit to lower back pain and lower extremity weakness. No visual changes, lightheadedness or dizziness, nausea or vomiting. She denies any feelings of syncope prior to falling.      Prior Level of Function:  Lives With: Alone  Type of Home: House  Home Layout: One level  Home Access: Level entry  Home Equipment: [employer], 4 wheeled walker, Cane, Lift chair, Alert Button   Bathroom Shower/Tub: Tub/Shower unit, Shower chair with back  [employer]: Standard (with BSC over toilet)  Bathroom Equipment: Shower chair, 3-in-1 commode    Receives Help From: Personal care attendant  ADL Assistance: Needs assistance  Homemaking Assistance: Needs assistance  Homemaking Responsibilities: Yes  Ambulation Assistance: Independent  Transfer Assistance: Independent  Active : No  Additional Comments: 2 hours/day DIVINA has an aide that assists with ADLs/IADLs. nieces live nearby and able to help at times. Restrictions/Precautions:  Restrictions/Precautions: General Precautions, Fall Risk  Position Activity Restriction  Other position/activity restrictions: L neglect, R artificial eye      SUBJECTIVE: The pt was sitting upright in her chair upon entry. Pt stating she \"feels better today\". She was agreeable to PT services. PAIN: 8/10 in her low back. Vitals: Vitals not assessed per clinical judgement, see nursing flowsheet    OBJECTIVE:  Bed Mobility:  Not Tested    Transfers:  Sit to Stand: Contact Guard Assistance, Minimal Assistance, with increased time for completion, cues for hand placement  Stand to [place] [id number], X 1, with increased time for completion, cues for hand placement    Ambulation:  Contact Guard Assistance, Minimal Assistance, with cues for safety, with increased time for completion  Distance: 10 feet x 2   Surface: Level Tile  Device:Rolling Walker  Gait Deviations: Forward Flexed Posture, Slow Gillian, Decreased Step Length Bilaterally, Decreased Gait Speed and Unsteady Gait. Pt needing cues for navigating her RW. Balance:  Static Sitting Balance:  Supervision  Dynamic Sitting Balance: Stand By Assistance  Static Standing Balance: Contact Guard Assistance, with cues for safety, with verbal cues     Exercise:  Patient was guided in 1 set(s) 10 reps of exercise to both lower extremities. Heelslides, Seated marches, Long arc quads and Seated abduction/adduction. Exercises were completed for increased independence with functional mobility.     Functional Outcome Measures: Completed  -PAC Inpatient Mobility Raw Score : 16  -PAC Inpatient T-Scale Score : 40.78    ASSESSMENT:  Assessment: Patient progressing toward established goals. The pt demonstrated better mobility today with less assist.   Activity Tolerance:  Patient tolerance of  treatment: good. Equipment Recommendations:Equipment Needed: No  Discharge Recommendations:   IP Rehab, Continue to assess pending progress    Plan: Times per week: 6x T  Current Treatment Recommendations: Strengthening, Transfer Training, ROM, Balance Training, Gait Training, Functional Mobility Training, Home Exercise Program    Patient Education  Patient Education: Plan of Care, Home Exercise Program, Transfers, Gait    Goals:  Patient goals : go home  Short term goals  Short term goal 1: Pt will supine<>sit with supervision in order to get out of bed  Short term goal 2: Pt will sit<>stand with SBA  and RW inorder to transfer surfaces  Short term goal 3: Pt will ambulate 40 feet with a RW and SBA inorder to walk around her room. Short term goal 4: Pt will tolerate~15 mins of sitting upright with SBA inorder to improve functional mobility  Long term goals  Time Frame for Long term goals : no goals set due to short ELOS    Following session, patient left in safe position with all fall risk precautions in place.     Mariah Overton, SPT

## 2021-06-30 NOTE — PROGRESS NOTES
Patient:  Samm Lopez    Unit/Bed:3A-07/007-A  MRN: [id number]   PCP: Harshad Cueva MD  Date of Admission: 6/28/2021    Assessment and Plan(All pulmonary edema, renal failure, PE, and respiratory failure diagnoses are acute in nature unless otherwise specified):          1. Fall: Associated with head trauma obvious injury to the right frontal region.  Associated with small subarachnoid hemorrhage. 2. Subarachnoid hemorrhage: Traumatic in nature.  Status post TXA.  Maintain systolic blood pressure between 100-160.  TEG normal.  No progression on CT head 6/29/21. Suspect represents edema from tumor. 3. Right occipital and parietal lobe brain mass: Suspicious for glioblastoma. Neurosurgery consulted.  Seizure prophylaxis with Keppra. Plans are for surgery on Friday. 4. Temporal arteritis: On steroids. 5. Hyperlipidemia: On atorvastatin. 6. Hypothyroidism: On Synthroid. 7. History of breast cancer:  Ultrasound shows no metastatic process to the liver. Obtain ultrasound of the liver.  No suspicious lesion identified on CT scan abdomen and pelvis. CC: Brain mass  HPI: Patient is an 80year-old frail white female reformed smoker. Sorin Jaimes had her right eye removed in 2017.  She has remote cholecystectomy in 2003 and a remote partial hysterectomy. Patient has a history of hypothyroidism, GERD and degenerative arthritis. 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  Patient has temporal arteritis with right-sided blindness.  She has a history of breast cancer diagnosed in 2016 with an isolated liver lesion.  Patient has a history of recurrent falls.   Patient sustained a fall early this morning while trying to go to the bathroom.  She states that her legs gave out when she was trying to ambulate and she fell to the ground.  She apparently hit her head on the nightstand injuring her right eye.  There was no loss of consciousness or seizure activity.  She was too weak to bear her own weight.  When home health arrived they found her on the floor at 8 Luisaj Medina was transferred to the emergency room where she underwent a thorough work-up.  She had an abnormal CT scan which precipitated MRI head.  Findings were consistent with a right sided brain mass consistent with glioblastoma.  As patient had small subarachnoid hemorrhage, she was administered TXA.  Blood pressure parameters were maintained systolic blood pressure between 100-160.  Neurosurgery was consulted. For further details, please review the assessment and plan. ROS: No fever. No shortness of breath. No chest pain. PMH:  Per HPI  SHX: Previous smoker at half a pack of cigarettes a day. FHX: Sister had breast cancer. Allergies: Bactrim and penicillin  Medications:     sodium chloride      sodium chloride      dextrose        [START ON 7/2/2021] aminolevulinic acid  20 mg/kg Oral Once    calcium-vitamin D  1 tablet Oral BID    isosorbide mononitrate  30 mg Oral Daily    levothyroxine  100 mcg Oral Daily    cetirizine  10 mg Oral Daily    multivitamin  1 tablet Oral Daily    pantoprazole  40 mg Oral QAM AC    sertraline  50 mg Oral BID    atorvastatin  10 mg Oral Nightly    sodium chloride flush  5-40 mL Intravenous 2 times per day    polyethylene glycol  17 g Oral Daily    lidocaine  2 patch Topical Daily    levetiracetam  500 mg Intravenous Q12H    insulin lispro  0-6 Units Subcutaneous TID WC    insulin lispro  0-3 Units Subcutaneous Nightly    docusate sodium  100 mg Oral BID    dexamethasone  4 mg Intravenous Daily       Vital Signs:   T: 98.4: P: 64 RR: 11 B/P: 105/81: FiO2: RA: O2 Sat:97: I/O: 3394/1750 GCS: 15  Body mass index is 25.53 kg/m². Pylba General:   Elderly frail-appearing white female. HEENT:  normocephalic.  No scleral icterus. Right eye does not react to light. Right eye swollen with hematoma. Right eye is blind. Disconjugate gaze. Neck: supple.  No Thyromegaly.   Gaby Dumont to auscultation.  No retractions  Cardiac: RRR.  No JVD. Abdomen: soft.  Nontender. Nondistended. Extremities:  No clubbing, cyanosis, or edema x 4.    Vasculature: capillary refill < 3 seconds. Palpable dorsalis pedis pulses. Skin:  warm and dry. Diminished turgor. Psych:  Alert and oriented x3.  Affect appropriate  Lymph:  No supraclavicular adenopathy. Neurologic:  No focal deficit. No seizures. No upper extremity or lower extremity pronator drift. Patient has slight left facial droop. No dysarthria. No dysphasia. Data: (All radiographs, tracings, PFTs, and imaging are personally viewed and interpreted unless otherwise noted). · Telemetry shows sinus rhythm. · Liver ultrasound shows no evidence of metastatic disease. It did show some right sided hydronephrosis. Report 6/28/2021. · CT scan head consistent with brain mass as previously described. Surrounding edema noted. · Glucose 102. Case discussed in detail with the family, patient, and with Dr. Myrna Medina. Electronically signed by Jefrey Purl [place] M.D.

## 2021-06-30 NOTE — PROGRESS NOTES
[facility]  Daily Progress Note    Pt Name: Eamon Hobbs  Medical Record Number: [id number]  Date of Birth 5/29/1933   Today's Date: 6/30/2021    HD: # 2    CC: \"My back feels a little better\"    ASSESSMENT  1. Active Hospital Problems    Diagnosis Date Noted    Fall [W19. XXXA] 06/28/2021    Traumatic ecchymosis of forehead [S00.83XA] 06/28/2021    Right parietal lobe mass [G93.89] 06/28/2021    Subarachnoid hemorrhage following injury, no loss of consciousness ([facility] [address] [S06.6X0A] 06/28/2021    Traumatic ecchymosis of left lower leg [S80.12XA] 06/28/2021    Traumatic ecchymosis of left forearm [S50.12XA] 06/28/2021    Platelet inhibition due to Plavix [Z79.02] 06/28/2021    Fall at home, initial encounter [W19. Narinder , C37.143] 06/28/2021         PLAN  Admit to the ICU under Trauma Services   - OK to transfer out of ICU to step-down     Trauma by frequent falls              - Fall precautions              - PT, OT continue to treat     Right parietoccipital lobe mass with mass effect and midline shift              - Neurosurgery assisting with management, plan for surgery on Friday              - MRI of the brain: High grade glioma, glioblastoma multiform, favored              - Intensivist assisting with surgical clearance   - Dexamethasone continues     Subarachnoid and parenchymal hemorrhage              - Neurosurgery managing small ICH non operatively              - TXA and platelets given 7/90              - Keppra 1,000mg loading dose given 6/28; 500mg BID thereafter              - Repeat head CT 6/29: stable              - Neuro checks              - Maintain SBP <160 >100              - Seizure precautions               - Plavix and Aspirin held     Traumatic ecchymosis to left lower leg and left forearm, right forehead ecchymosis              - Plavix and aspirin held     Bilateral lower extremity weakness              - Up with assistance - PT, OT continue to treat              - Fall precautions     Hx of hypothyroidism, anxiety, GERD, SVT, HLD, steroid-induced hyperglycemia, breast cancer with mets to the liver              - Home meds resumed              - Accuchecks ACHS with sliding scale insulin              - Intensivist assisting with medical management     Pain control              - Tylenol PRN              - Lidoderm patches     General diet  Stop IVF hydration  Repeat labs in AM  Prophylaxis: SCDs, IS, C&DB, Pepcid, stool softeners  PT, OT, SLP continue to treat  DNR-CCA     Discharge disposition pending clinical course   - PM&R has been consulted     SUBJECTIVE  Patient seen in ICU this AM. She is up in the chair at the time of rounds. She is alert and oriented. She states that she does not have a headache and her back feels better than it did when she came in. Family is at bedside. They have questions regarding surgical procedure that is tentatively scheduled for Friday. They would like to know risks and benefits and have questions regarding the type of mass. Neurosurgery is yet to discuss the specifics of the surgery and answer questions. Anuj Santos continues to have bilateral lower extremity weakness. She denies dizziness, lightheadedness, nausea, vomiting. She is stable from a trauma perspective. Case discussed with trauma surgeon, Dr. Alexandra Gonzalez.        Wt Readings from Last 3 Encounters:   06/30/21 130 lb 11.7 oz (59.3 kg)   06/15/21 137 lb 4 oz (62.3 kg)   05/25/21 136 lb (61.7 kg)     Temp Readings from Last 3 Encounters:   06/30/21 98.5 °F (36.9 °C) (Oral)   06/15/21 96.6 °F (35.9 °C) (Skin)   05/25/21 97.6 °F (36.4 °C) (Skin)     BP Readings from Last 3 Encounters:   06/30/21 (!) 143/58   06/15/21 112/70   05/25/21 (!) 116/58     Pulse Readings from Last 3 Encounters:   06/30/21 58   06/15/21 76   05/25/21 72       24 HR INTAKE/OUTPUT :     Intake/Output Summary (Last 24 hours) at 6/30/2021 6992  Last data filed at 6/30/2021 0457  Gross per 24 hour   Intake 3394 ml   Output 1750 ml   Net 1644 ml     ADULT DIET; Dysphagia - Soft and Bite Sized    OBJECTIVE  CURRENT VITALS BP (!) 150/50   Pulse 63   Temp 98.4 °F (36.9 °C) (Oral)   Resp 18   Ht 5' (1.524 m)   Wt 130 lb 11.7 oz (59.3 kg)   LMP  (LMP Unknown)   SpO2 96%   BMI 25.53 kg/m²   GENERAL: alert, cooperative, no distress  NEURO: Awake, alert and oriented. PERRL. Conversing fluently and appropriately   LUNGS: clear to auscultation bilaterally- no wheezes, rales or rhonchi, normal air movement, no respiratory distress  HEART: normal rate, normal S1 and S2, no gallops, intact distal pulses and no carotid bruits  ABDOMEN: soft, non-tender, non-distended, normal bowel sounds, no masses or organomegaly  WOUNDS: Ecchymosis to right side of forehead and right eye. No palpable hematoma. Scattered ecchymosis in various stages of healing to extremities. EXTREMITY: no cyanosis, no clubbing and no edema      LABS  CBC :   Recent Labs     06/28/21  0908 06/29/21  0405   WBC 8.5 8.8   HGB 12.1 12.9   HCT 39.1 42.6   MCV 95.4 98.8    156     BMP:   Recent Labs     06/28/21  0908 06/29/21  0405    138   K 4.1 4.6    102   CO2 28 25   BUN 19 15   CREATININE 0.6 0.7     COAGS:   Recent Labs     06/28/21  0908 06/29/21  0405   PROT 6.2 6.1     Pancreas/HFP:  No results for input(s): LIPASE, AMYLASE in the last 72 hours. Recent Labs     06/28/21  0908 06/29/21  0405   AST 28 29   ALT 19 18   BILITOT 0.5 0.4   ALKPHOS 39 44     RADIOLOGY:  No new results      Electronically signed by PAMELA Eller - CNP on 6/30/2021 at 7:58 AM Patient seen and examined independently by me. Above discussed and I agree with CNP. Labs, cultures, and radiographs where available were reviewed. See orders for the updated patient care plan.     Sarah Guadarrama MD MD, patient sitting up in her ICU chair she is awake alert periorbital ecchymoses is improved patient apparently planning on surgery for Friday from trauma standpoint stable will transfer services to medicine or neurosurgery postop`  6/30/2021   2:22 PM

## 2021-06-30 NOTE — CONSULTS
Physical Medicine & Rehabilitation   Consult Note      Admitting Physician: Abimael Avalos MD    Primary Care Provider: Ladonna Muro MD     Reason for Consult:  Brain mass. Rehab needs    History of Present Illness:  Prabhjot Faustin is a 80 y.o. female admitted to [address] [place] [address] on 6/28/2021. Patient  has a past medical history of Anxiety, Blind right eye, Breast cancer metastasized to liver [facility]), Carotid stenosis, Colostomy in place [facility]), Degenerative arthritis of cervical spine, GERD (gastroesophageal reflux disease), Hypercholesteremia, Hypoestrogenism, Hypothyroidism, Lumbago, Lumbosacral spinal stenosis, MDRO (multiple drug resistant organisms) resistance, Personal history of cardiac dysrhythmia, Sigmoid diverticulosis, Spondylolisthesis of lumbosacral region, Steroid-induced hyperglycemia, Subclavian artery stenosis ([address] Superior and Inferior Pubic ramus fracture, right, closed, initial encounter ([address] SVT (supraventricular tachycardia) ([address] Temporal arteritis ([address] Vertebral artery occlusion, and Vitamin D deficiency. Patient presented to [facility] ER after suffering a fall at home. Patient was attempting to ambulate when her legs gave out and she hit her nightstand. No LOC. She was too weak to get up. [facility] and found her on the floor and called EMS. patient underwent CT head and found to have right side brain mass with consideration for glioblastoma along with small subarachnoid hemorrhage. patient was admitted to ICU for further monitoring. Family meeting today, plan for surgery on Friday with Dr Janet Sandoval for surgical resection of the right brain mass. Patient seen up in chair in ICU today. Family at bedside. Patient reports she resides alone. Family reports her brother is coming up from [place], but unsure about how long he can stay. Discussed needing discharge plan options after surgery.  Discussed evaluations with therapy post op and monitoring for appropriate level of therapy at discharge. Patient and family understanding. Current Rehabilitation Assessments:  PT:    Range of Motion:  Bilateral Lower Extremity: WFL  Strength:  Bilateral Lower Extremity: Impaired - 4-/5. Pt needed alot of verbal cues, especially regarding the L side   Balance:  Static Sitting Balance:  Stand By Assistance, with cues for safety, with verbal cues , with increased time for completion. Cues needed to sit upright. Dynamic Sitting Balance: Stand By Assistance, with cues for safety, with verbal cues , with increased time for completion  Static Standing Balance: Contact Guard Assistance, X 1, with cues for safety, with increased time for completion, with RW   Bed Mobility:  Supine to Sit: Moderate Assistance, X 1, with head of bed raised, with rail, with verbal cues , with increased time for completion. Pt able to move legs off EOB. Pt needed assistance with bringing trunk upright. Scooting: Contact Guard Assistance, with rail, with verbal cues , with increased time for completion  Transfers:  Sit to Stand: Minimal Assistance, X 1, with increased time for completion, cues for hand placement. Conducted 3x. Stand to [place] [id number], with increased time for completion, cues for hand placement, with verbal cues   Stand Pivot: Contact Guard Assistance, with increased time for completion, cues for walker placement. Conducted 2x from chair<>commode  Ambulation:  Contact Guard Assistance to minimal assistance , with cues for safety, with verbal cues , with increased time for completion  Distance: 3 feet   Surface: Level Tile  Device:Rolling Walker  Gait Deviations:   Forward Flexed Posture, Slow Gillian, Decreased Step Length Bilaterally, Decreased Gait Speed, Narrow Base of Support and Unsteady Gait  Pt needed frequent cues with moving the RW      OT:    COGNITION: Slow Processing, Decreased Recall, Decreased Problem Solving, Decreased Safety Awareness and Impaired Attention  RANGE OF MOTION:  Bilateral Upper Extremity:  WFL  STRENGTH:  Right Upper Extremity: WFL  SENSATION:   WFL  ADL:   Grooming: Minimal Assistance. MAX extended time required for completion of oral care with depth perception and L neglect issues noted throughout session  Lower Extremity Dressing: Dependent. socks  Toileting: Minimal Assistance.  hygiene  Toilet Transfer: Moderate Assistance. d/t low toilet height and poor placement of grab bars. BALANCE:  Sitting Balance:  Supervision. Standing Balance: Contact Guard Assistance. TRANSFERS:  Sit to Stand:  Contact Guard Assistance. Stand to Sit: Contact Guard Assistance. FUNCTIONAL MOBILITY:  Assistive Device: Rolling Walker  Assist Level:  Contact Guard Assistance. Distance: To and from bathroom  And within room, slow pace, cues to attend to L side. ST:    INTERVENTIONS: Plans to complete advanced meal trial this date at 1300. Pt plans for transfer to  step down. Advanced meal order obtained from pt. Will complete later this date to determine appropriateness for further dietary advancement. INTERVENTIONS: Orientation:   Month: indep  Date: Following logical cueing, pt off x1 date  Year: indep  [place]: FO2  Location: 2/2 indep  Time: FO2     Functional carryover of 5 item grocery list:  Immediate recall: 4/5 indep, 1/5 unable to elicit   Repetition: 4/5 indep, 1/5 min cues  10+ minute delay: 3/5 indep, 2/5 min cues      INTERVENTIONS: Oral reading of written grocery list: 1/2 indep, 1/2 unable to elicit with need for max cues  *presence of L neglect present  *decreased visual acuity     INTERVENTIONS: Lights turned on upon ST arrival; however, television off. Pt with appropriate engagement/participation. Concerns for fatigue with multiple interruptions from various staff. Will continue to monitor and provide further education on low stimulation guidelines as warranted.        Past Medical History:        Diagnosis Date    Anxiety     Blind right eye  Breast cancer metastasized to liver ([facility] 05/10/2016    Liver lesion noted     Carotid stenosis      Left ICA 25%    Colostomy in place [facility]) 05/27/2016    Degenerative arthritis of cervical spine 5/07    GERD (gastroesophageal reflux disease) 11/06    Hypercholesteremia     Hypoestrogenism     Hypothyroidism     Lumbago     Lumbosacral spinal stenosis 05/2019    MDRO (multiple drug resistant organisms) resistance 2008    pt stated cleared    Personal history of cardiac dysrhythmia     Sigmoid diverticulosis 8/04    Spondylolisthesis of lumbosacral region 8/04    Steroid-induced hyperglycemia     Subclavian artery stenosis (HCC)     left    Superior and Inferior Pubic ramus fracture, right, closed, initial encounter ([facility] 05/21/2019    SVT (supraventricular tachycardia) ([facility] 6/07    Temporal arteritis ([facility]     Vertebral artery occlusion     left    Vitamin D deficiency 06/2017       Past Surgical History:        Procedure Laterality Date    CARDIAC CATHETERIZATION  5/07    CATARACT REMOVAL  right 1/08; left 2/08    CHOLECYSTECTOMY  1/03    COLONOSCOPY  11/06    COLOSTOMY  09/04/2018    REVERSAL OF COLOSTOMY    DILATION AND CURETTAGE OF UTERUS      ENDOSCOPY, COLON, DIAGNOSTIC      EYE REMOVAL Right 03/2017    EYE SURGERY      EYE SURGERY Right 11/06/2017    Dr Villanueva Payor in [address]      partial    OTHER SURGICAL HISTORY  05/27/2016    robotic assisted laparoscopic anterior colon resection and end colostomy    MO OFFICE/OUTPT VISIT,PROCEDURE ONLY N/A 9/4/2018    COLOSTOMY REVERSAL AND PARASTOMAL HERNIA REPAIR performed by Willow Grewal MD at [address] [facility]  2002    UPPER GASTROINTESTINAL ENDOSCOPY  11/06       Allergies:     Allergies   Allergen Reactions    Bactrim [Sulfamethoxazole-Trimethoprim] Swelling     Of tongue    Demerol Rash     nausea    Pcn [Penicillins] Rash        Current Medications:   Current Facility-Administered Medications: hydrALAZINE (APRESOLINE) injection 10 mg, 10 mg, Intravenous, Q8H PRN  calcium-vitamin D (OSCAL-500) 500-200 MG-UNIT per tablet 1 tablet, 1 tablet, Oral, BID  isosorbide mononitrate (IMDUR) extended release tablet 30 mg, 30 mg, Oral, Daily  levothyroxine (SYNTHROID) tablet 100 mcg, 100 mcg, Oral, Daily  cetirizine (ZYRTEC) tablet 10 mg, 10 mg, Oral, Daily  multivitamin 1 tablet, 1 tablet, Oral, Daily  pantoprazole (PROTONIX) tablet 40 mg, 40 mg, Oral, QAM AC  sertraline (ZOLOFT) tablet 50 mg, 50 mg, Oral, BID  atorvastatin (LIPITOR) tablet 10 mg, 10 mg, Oral, Nightly  traZODone (DESYREL) tablet 50 mg, 50 mg, Oral, Nightly PRN  sodium chloride flush 0.9 % injection 5-40 mL, 5-40 mL, Intravenous, 2 times per day  sodium chloride flush 0.9 % injection 5-40 mL, 5-40 mL, Intravenous, PRN  0.9 % sodium chloride infusion, 25 mL, Intravenous, PRN  ondansetron (ZOFRAN-ODT) disintegrating tablet 4 mg, 4 mg, Oral, Q8H PRN **OR** ondansetron (ZOFRAN) injection 4 mg, 4 mg, Intravenous, Q6H PRN  polyethylene glycol (GLYCOLAX) packet 17 g, 17 g, Oral, Daily  fleet rectal enema 1 enema, 1 enema, Rectal, Daily PRN  0.9 % sodium chloride infusion, , Intravenous, Continuous  acetaminophen (TYLENOL) tablet 650 mg, 650 mg, Oral, Q4H PRN  lidocaine 4 % external patch 2 patch, 2 patch, Topical, Daily  levETIRAcetam (KEPPRA) 500 mg in sodium chloride 0.9 % 100 mL IVPB, 500 mg, Intravenous, Q12H  0.9 % sodium chloride infusion, , Intravenous, PRN  insulin lispro (HUMALOG) injection vial 0-6 Units, 0-6 Units, Subcutaneous, TID WC  insulin lispro (HUMALOG) injection vial 0-3 Units, 0-3 Units, Subcutaneous, Nightly  glucose (GLUTOSE) 40 % oral gel 15 g, 15 g, Oral, PRN  dextrose 50 % IV solution, 12.5 g, Intravenous, PRN  glucagon (rDNA) injection 1 mg, 1 mg, Intramuscular, PRN  dextrose 5 % solution, 100 mL/hr, Intravenous, PRN  docusate sodium (COLACE) capsule 100 mg, 100 mg, Oral, BID  Dexamethasone Sodium Phosphate injection 4 mg, 4 mg, Intravenous, Daily    Social History:  Social History     Socioeconomic History    Marital status:      Spouse name: Not on file    Number of children: 0    Years of education: 10th grade     Highest education level: Not on file   Occupational History    Not on file   Tobacco Use    Smoking status: Former Smoker     Packs/day: 0.50     Types: Cigarettes    Smokeless tobacco: Never Used   Vaping Use    Vaping Use: Every day    Substances: Always   Substance and Sexual Activity    Alcohol use: No    Drug use: No    Sexual activity: Never   Other Topics Concern    Not on file   Social History Narrative    Not on file     Social Determinants of Health     Financial Resource Strain: Low Risk     Difficulty of Paying Living Expenses: Not hard at all   Food Insecurity: No Food Insecurity    Worried About [employer] [employer] in the Last Year: Never true    [employer] [employer] in the Last Year: Never true   Transportation Needs:     Lack of Transportation (Medical):      Lack of Transportation (Non-Medical):    Physical Activity:     Days of Exercise per Week:     Minutes of Exercise per Session:    Stress:     Feeling of Stress :    Social Connections:     Frequency of Communication with Friends and Family:     Frequency of Social Gatherings with Friends and Family:     Attends [employer] Services:     Active Member of Clubs or Organizations:     Attends Club or Organization Meetings:     Marital Status:    Intimate Partner Violence:     Fear of Current or Ex-Partner:     Emotionally Abused:     Physically Abused:     Sexually Abused:      Lives With: Alone  Type of Home: House  Home Layout: One level  Home Access: Level entry  Home Equipment: Sammy Aparicioor, 4 wheeled walker, Cane, Lift chair, Alert Button   Bathroom Shower/Tub: Tub/Shower unit, Shower chair with back  [employer]: Standard (with BSC over toilet)  Bathroom Equipment: Shower chair, 3-in-1 commode     Receives Help From: Personal care attendant  ADL Assistance: Needs assistance  Homemaking Assistance: Needs assistance  Homemaking Responsibilities: Yes  Ambulation Assistance: Independent  Transfer Assistance: Independent     Active : No  Patient's  Info: family  Additional Comments: 2 hours/day DIVINA has an aide that assists with ADLs/IADLs. nieces live nearby and able to help at times.     Family History:       Problem Relation Age of Onset    Cancer Sister 61        breast    Cancer Brother 79        lung    Cancer Brother 68        colon    Cancer Son 48        lung       Review of Systems:  CONSTITUTIONAL:  positive for  fatigue  EYES: blind right eye, cataracts left  HEENT:  negative  RESPIRATORY:  negative  CARDIOVASCULAR:  negative  GASTROINTESTINAL:  Negative, + BM yesterday  GENITOURINARY:  garcia  SKIN:  Bruising, right facial  HEMATOLOGIC/LYMPHATIC:  positive for swelling/edema  MUSCULOSKELETAL:  positive for  muscle weakness  NEUROLOGICAL:  positive for gait problems and weakness  BEHAVIOR/PSYCH:  negative  System review otherwise negative    Physical Exam:  BP (!) 150/50   Pulse 63   Temp 98.4 °F (36.9 °C) (Oral)   Resp 18   Ht 5' (1.524 m)   Wt 130 lb 11.7 oz (59.3 kg)   LMP  (LMP Unknown)   SpO2 96%   BMI 25.53 kg/m²   awake  Orientation:   person, place, time  Mood: within normal limits  Affect: calm  General appearance: Patient is well nourished, well developed, well groomed and in no acute distress    Memory:   normal,   Attention/Concentration: normal  Language:  normal    Cranial Nerves:  cranial nerves II-XII are grossly intact except chronic vision issues with right eye blindness and left eye cataracts  ROM:  normal  Motor Exam:  Motor exam is symmetrical 4+ out of 5 all extremities bilaterally  Tone:  normal  Muscle bulk: within normal limits  Sensory:  Sensory intact    Heart: normal rate, regular rhythm, normal S1, S2, no murmurs, rubs, clicks or gallops  Lungs: clear to auscultation without wheezes or rales  Abdomen: soft, non-tender, non-distended, normal bowel sounds, no masses or organomegaly    Skin: warm and dry, no rash or erythema  Peripheral vascular: Pulses: Normal upper and lower extremity pulses; Edema: trace      Diagnostics:  Recent Results (from the past 24 hour(s))   POCT glucose    Collection Time: 06/29/21  4:42 PM   Result Value Ref Range    POC Glucose 91 70 - 108 mg/dl   POCT glucose    Collection Time: 06/29/21  8:18 PM   Result Value Ref Range    POC Glucose 147 (H) 70 - 108 mg/dl   EKG 12 Lead    Collection Time: 06/30/21  5:37 AM   Result Value Ref Range    Ventricular Rate 62 BPM    Atrial Rate 62 BPM    P-R Interval 154 ms    QRS Duration 84 ms    Q-T Interval 422 ms    QTc Calculation (Bazett) 428 ms    P Axis 55 degrees    R Axis 35 degrees    T Axis 137 degrees     PROCEDURE: MRI BRAIN W WO CONTRAST       INDICATION:brain mass. Fall today.       COMPARISON: CT head from the same date and MR brain dated 10/23/2009.       TECHNIQUE: Multiplanar and multiple spin echo T1 and T2-weighted images were obtained through the brain before and after the administration of intravenous contrast. 15 mL ProHance was injected in the right AC.       FINDINGS:   There is an intra-axial multilobulated mass centered at the right occipital/temporal lobe junction measuring 5.5 x 3.9 cm in greatest axial dimensions. There are lobulated low T1, hyperintense T2 signal components centrally. There is thickened and    nodular irregular peripheral enhancement following contrast administration. There are nodular enhancing septations centrally and inferiorly within the lesion. No other enhancing lesions are identified within the parenchyma. There is prominent T2/FLAIR    prolongation adjacent to the lesion extending anteriorly into the temporal, parietal and frontal lobes and in the right basal ganglia, as well as the splenium of the corpus callosum.  There is prominent mass effect on the occipital horn of the right    lateral ventricle with near complete effacement. There is 9 millimeters leftward midline shift at the level of the foramen of Akins.       There are moderate confluent and patchy areas of T2/FLAIR prolongation in the periventricular, subcortical deep white matter elsewhere. No focal areas of restricted diffusion are present. The major vascular flow voids appear patent. There is artifact    from patient's right ocular implant. Patient is status post lens extraction on the left. Orbits are otherwise unremarkable. Paranasal sinuses and mastoid air cells are clear.               Impression    Heterogeneous 5.5 cm nodular peripherally enhancing cystic and solid mass centered at the right occipital/temporal lobe junction with prominent adjacent T2/FLAIR prolongation involving the corpus callosum, temporal, parietal and frontal lobes with    prominent mass effect on the right lateral ventricle and 9 mm leftward midline shift. 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 Impression:  · Right brain mass - consistent with glioblastoma, await surgery and biopsy  · Fall  · CHI with facial bruising  · Subarachnoid and parenchymal hemorrhage  · Traumatic ecchymosis to left lower leg and left forearm, right forehead ecchymosis  · Bilateral lower extremity weakness  · Hypotension  · Anxiety  · GERD  · SVT  · HLD  · Hyperglycemia, steroid induced  · Hx Breast cancer with mets to the liver    Recommendations:  · Continue current therapies  · [facility]  · Surgery planning for Friday 7/2/21  · Await therapy evals post op  · Will follow up with patient on 7/6/21 and assist with level of therapy needed at discharge. It was my pleasure to evaluate Latanya Rod today. Please call with questions.     Ethan Hidalgo, PAMELA - CNP

## 2021-06-30 NOTE — PLAN OF CARE
Problem: Pain:  Description: Pain management should include both nonpharmacologic and pharmacologic interventions. Goal: Pain level will decrease  Description: Pain level will decrease  Outcome: Ongoing  Goal: Control of acute pain  Description: Control of acute pain  Outcome: Ongoing  Goal: Control of chronic pain  Description: Control of chronic pain  Outcome: Ongoing     Problem: Skin Integrity:  Goal: Will show no infection signs and symptoms  Description: Will show no infection signs and symptoms  Outcome: Ongoing  Goal: Absence of new skin breakdown  Description: Absence of new skin breakdown  Outcome: Ongoing     Problem: Falls - Risk of:  Goal: Will remain free from falls  Description: Will remain free from falls  Outcome: Ongoing     Problem: Discharge Planning:  Goal: Discharged to appropriate level of care  Description: Discharged to appropriate level of care  Outcome: Ongoing     Care plan reviewed with patient. Patient verbalizes understanding of the plan of care and contributes to goal setting.

## 2021-06-30 NOTE — CARE COORDINATION
6/30/21, 1:56 PM EDT    DISCHARGE ON GOING EVALUATION    [facility] day: 2  Location: 3A-07/007-A Reason for admit: Fall at home, initial encounter [W19. Christiano Doty, [id number]]   Procedure:   6/28 CT Head/Facial bones/Chest/Abd/Pelvis: See injuries  6/28 CT Cervical/Thoracic/Lumbar spine: See injuries  6/28 MRI Brain:  Heterogeneous 5.5 cm nodular peripherally enhancing cystic and solid mass centered at the right occipital/temporal lobe junction with prominent adjacent T2/FLAIR prolongation involving the corpus callosum, temporal, parietal and frontal lobes with prominent mass effect on the right lateral ventricle and 9 mm leftward midline shift. High-grade glioma (glioblastoma multiform) is favored  6/29 US Liver: Mild right hydronephrosis  6/29 CT Head: Extensive right hemispheric abnormality is concerning for tumor, with mass effect.  No significant change since yesterday.  Less likely is recent infarct. Barriers to Discharge: Transferred out of ICU. N/S and hospitalist following. N/S planning surgical intervention tentatively on Friday. PT/OT/SLP. IVF. IV dexamethasone. IV keppra. PCP: rBie Kaplan MD  Readmission Risk Score: 20%  Patient Goals/Plan/Treatment Preferences: PMR/IPR following. Await recommendations post-op.

## 2021-06-30 NOTE — FLOWSHEET NOTE
26 Mrs Rod rests in the bed without apparent distress. She denies pain or SOB. NIH completed and scored a 2. She has an IV of NS at 75 cc's through a peripheral site right arm. She has an external urine drainage device which was removed. She is on RA and her pulse ox is 94-96%. She has asked for coffee. 0830 SHe was asssisted up to the chair for breakfast. She tolerates this well. PT and OT has come to see. 1000  [facility] has come to see and he will meet with her family at 18 today.

## 2021-06-30 NOTE — PROGRESS NOTES
[address] [facility] THERAPY  [facility] ICU 4D  DAILY NOTE    TIME   SLP Individual Minutes  Time In: 1034  Time Out: 1038  Minutes: 8  Timed Code Treatment Minutes: 8 Minutes   Cognitive therapy: 8 minutes     Date: 2021  Patient Name: Matthew Haines      CSN: [id number]   : 1933  (80 y.o.)  Gender: female   Referring Physician:  PAMELA Du CNP  Diagnosis: Fall at home, initial encounter  Secondary Diagnosis: Dysphagia, cognitive deficits  Precautions: Fall risk, aspiration precautions, low stimulation  Current Diet: Soft and bite size, thin liquids   Swallowing Strategies: Full Upright Position, Small Bite/Sip, Pulmonary Monitoring, Oral Care after all Meals, Direct 1:1 Supervision, Alternate Solids and Liquids, Limit Distractions and Monitor for Fatigue       Date of Last MBS/FEES: Not Applicable    Pain:  No pain reported. Subjective:  Pt seen upright in recliner with niece present. Alert and cooperative. Multiple interruptions throughout session (conversation with nursing, rounding trauma NP, etc). Total ST time spent in room ~25 minutes. Only 8 minutes of billable therapy provided with minutes adjusted above. Short-Term Goals:  SHORT TERM GOAL #1:  Goal 1: Pt will safely consume soft and bite size diet wtih thin liquids (advanced texture trials as appropriate; direct 1:1 assistance) without overt s/s aspiration or pulmonary compromise to assist with nutrition/hydration measures. INTERVENTIONS: Plans to complete advanced meal trial this date at 1300. Pt plans for transfer to  step down. Advanced meal order obtained from pt. Will complete later this date to determine appropriateness for further dietary advancement. SHORT TERM GOAL #2:  Goal 2: Pt will complete functional immediate/delayed recall tasks (inclusion of compensatory strategies) with 60% accuracy, mod cues to improve retention of pertinent information.   INTERVENTIONS: Orientation:   Month: indep  Date: Following logical cueing, pt off x1 date  Year: indep  [place]: FO2  Location: 2/2 indep  Time: FO2    Functional carryover of 5 item grocery list:  Immediate recall: 4/5 indep, 1/5 unable to elicit   Repetition: 4/5 indep, 1/5 min cues  10+ minute delay: 3/5 indep, 2/5 min cues     SHORT TERM GOAL #3:  Goal 3: Pt will complete problem solving, verbal/visual reasoning, and executive functioning tasks (time, basic money/math/medications) with 60% accuracy, mod cues to improve contribution within ADLs/IADLs. INTERVENTIONS: DNT d/t focus on additional STGs     SHORT TERM GOAL #4:  Goal 4: Pt will complete sequencing and thought organization tasks with 70% accuracy, mod cues to improve mental flexibility and processing speed. INTERVENTIONS: DNT d/t focus on additional STGs    SHORT TERM GOAL #5:  Goal 5: Pt will complete selective attention tasks (focus on the left) with no more than 5 errors until task completion to optimize reading comprehension and visual scanning requried for ADLs. INTERVENTIONS: Oral reading of written grocery list: 1/2 indep, 1/2 unable to elicit with need for max cues  *presence of L neglect present  *decreased visual acuity     SHORT TERM GOAL #6:  Goal 6: Pt and family will exhibit return demonstration for implementation of low stimulation guidelines/protocol given concerns within acute intracranial findings. INTERVENTIONS: Lights turned on upon ST arrival; however, television off. Pt with appropriate engagement/participation. Concerns for fatigue with multiple interruptions from various staff. Will continue to monitor and provide further education on low stimulation guidelines as warranted.      Long-Term Goals:  No LTG established given short ELOS     EDUCATION:  Learner: Patient and niece  Education:  Reviewed ST goals and Plan of Care and Reviewed recommendations for follow-up  Evaluation of Education: Adriano Olivarez understanding, Demonstrates with assistance and Needs further instruction    ASSESSMENT/PLAN:  Activity Tolerance:  Patient tolerance of  treatment: good. Cooperative with ST and POC. Assessment/Plan: Patient progressing toward established goals. Continues to require skilled care of licensed speech pathologist to progress toward achievement of established goals and plan of care. .     Plan for Next Session: advanced meal trial        Diane Pineda M.S. [id number] [id number] 6/30/2021

## 2021-07-01 ENCOUNTER — APPOINTMENT (OUTPATIENT)
Dept: GENERAL RADIOLOGY | Age: 86
DRG: 025 | End: 2021-07-01
Payer: MEDICARE

## 2021-07-01 ENCOUNTER — APPOINTMENT (OUTPATIENT)
Dept: MRI IMAGING | Age: 86
DRG: 025 | End: 2021-07-01
Payer: MEDICARE

## 2021-07-01 LAB
ACT TEG: 97 SECONDS (ref 86–118)
ALBUMIN SERPL-MCNC: 3.9 G/DL (ref 3.5–5.1)
ALP BLD-CCNC: 42 U/L (ref 38–126)
ALT SERPL-CCNC: 21 U/L (ref 11–66)
ANGLE, RAPID TEG: 78.3 DEG (ref 64–80)
ANION GAP SERPL CALCULATED.3IONS-SCNC: 7 MEQ/L (ref 8–16)
AST SERPL-CCNC: 29 U/L (ref 5–40)
BASOPHILS # BLD: 0.3 %
BASOPHILS ABSOLUTE: 0 THOU/MM3 (ref 0–0.1)
BILIRUB SERPL-MCNC: 0.4 MG/DL (ref 0.3–1.2)
BUN BLDV-MCNC: 20 MG/DL (ref 7–22)
CALCIUM SERPL-MCNC: 9.4 MG/DL (ref 8.5–10.5)
CHLORIDE BLD-SCNC: 108 MEQ/L (ref 98–111)
CO2: 25 MEQ/L (ref 23–33)
CREAT SERPL-MCNC: 0.7 MG/DL (ref 0.4–1.2)
EOSINOPHIL # BLD: 1.6 %
EOSINOPHILS ABSOLUTE: 0.2 THOU/MM3 (ref 0–0.4)
EPL-TEG: 0 % (ref 0–15)
ERYTHROCYTE [DISTWIDTH] IN BLOOD BY AUTOMATED COUNT: 14.3 % (ref 11.5–14.5)
ERYTHROCYTE [DISTWIDTH] IN BLOOD BY AUTOMATED COUNT: 50.4 FL (ref 35–45)
GFR SERPL CREATININE-BSD FRML MDRD: 79 ML/MIN/1.73M2
GLUCOSE BLD-MCNC: 121 MG/DL (ref 70–108)
GLUCOSE BLD-MCNC: 123 MG/DL (ref 70–108)
GLUCOSE BLD-MCNC: 76 MG/DL (ref 70–108)
GLUCOSE BLD-MCNC: 95 MG/DL (ref 70–108)
GLUCOSE BLD-MCNC: 99 MG/DL (ref 70–108)
HCT VFR BLD CALC: 42.1 % (ref 37–47)
HEMOGLOBIN: 12.9 GM/DL (ref 12–16)
HEPARIN THERAPY: NO
IMMATURE GRANS (ABS): 0.1 THOU/MM3 (ref 0–0.07)
IMMATURE GRANULOCYTES: 1 %
KINETICS RAPID TEG: 0.9 MINUTES (ref 0.5–2.3)
LY30 (LYSIS) TEG: 0 % (ref 0–7.5)
LYMPHOCYTES # BLD: 22.6 %
LYMPHOCYTES ABSOLUTE: 2.3 THOU/MM3 (ref 1–4.8)
MA(MAX CLOT) RAPID TEG: 68.7 MM (ref 52–71)
MCH RBC QN AUTO: 29.5 PG (ref 26–33)
MCHC RBC AUTO-ENTMCNC: 30.6 GM/DL (ref 32.2–35.5)
MCV RBC AUTO: 96.3 FL (ref 81–99)
MONOCYTES # BLD: 6.9 %
MONOCYTES ABSOLUTE: 0.7 THOU/MM3 (ref 0.4–1.3)
NUCLEATED RED BLOOD CELLS: 0 /100 WBC
PLATELET # BLD: 147 THOU/MM3 (ref 130–400)
PLATELET ESTIMATE: ADEQUATE
PMV BLD AUTO: 11.9 FL (ref 9.4–12.4)
POTASSIUM REFLEX MAGNESIUM: 4.1 MEQ/L (ref 3.5–5.2)
RBC # BLD: 4.37 MILL/MM3 (ref 4.2–5.4)
REACTION TIME RAPID TEG: 0.5 MINUTES (ref 0.4–1)
SCAN OF BLOOD SMEAR: NORMAL
SEG NEUTROPHILS: 67.6 %
SEGMENTED NEUTROPHILS ABSOLUTE COUNT: 6.8 THOU/MM3 (ref 1.8–7.7)
SODIUM BLD-SCNC: 140 MEQ/L (ref 135–145)
TOTAL PROTEIN: 6.2 G/DL (ref 6.1–8)
WBC # BLD: 10.1 THOU/MM3 (ref 4.8–10.8)

## 2021-07-01 PROCEDURE — 2140000000 HC CCU INTERMEDIATE R&B

## 2021-07-01 PROCEDURE — 99232 SBSQ HOSP IP/OBS MODERATE 35: CPT | Performed by: INTERNAL MEDICINE

## 2021-07-01 PROCEDURE — 97110 THERAPEUTIC EXERCISES: CPT

## 2021-07-01 PROCEDURE — A9579 GAD-BASE MR CONTRAST NOS,1ML: HCPCS | Performed by: NURSE PRACTITIONER

## 2021-07-01 PROCEDURE — 6360000002 HC RX W HCPCS: Performed by: NURSE PRACTITIONER

## 2021-07-01 PROCEDURE — 6370000000 HC RX 637 (ALT 250 FOR IP): Performed by: STUDENT IN AN ORGANIZED HEALTH CARE EDUCATION/TRAINING PROGRAM

## 2021-07-01 PROCEDURE — 71045 X-RAY EXAM CHEST 1 VIEW: CPT

## 2021-07-01 PROCEDURE — 82948 REAGENT STRIP/BLOOD GLUCOSE: CPT

## 2021-07-01 PROCEDURE — 99232 SBSQ HOSP IP/OBS MODERATE 35: CPT | Performed by: SURGERY

## 2021-07-01 PROCEDURE — 97129 THER IVNTJ 1ST 15 MIN: CPT

## 2021-07-01 PROCEDURE — 6360000004 HC RX CONTRAST MEDICATION: Performed by: NURSE PRACTITIONER

## 2021-07-01 PROCEDURE — 6370000000 HC RX 637 (ALT 250 FOR IP): Performed by: NURSE PRACTITIONER

## 2021-07-01 PROCEDURE — 97530 THERAPEUTIC ACTIVITIES: CPT

## 2021-07-01 PROCEDURE — 80053 COMPREHEN METABOLIC PANEL: CPT

## 2021-07-01 PROCEDURE — 36415 COLL VENOUS BLD VENIPUNCTURE: CPT

## 2021-07-01 PROCEDURE — 85025 COMPLETE CBC W/AUTO DIFF WBC: CPT

## 2021-07-01 PROCEDURE — 70552 MRI BRAIN STEM W/DYE: CPT

## 2021-07-01 PROCEDURE — 2580000003 HC RX 258: Performed by: NURSE PRACTITIONER

## 2021-07-01 PROCEDURE — APPSS45 APP SPLIT SHARED TIME 31-45 MINUTES: Performed by: PHYSICIAN ASSISTANT

## 2021-07-01 RX ORDER — ACETAMINOPHEN 325 MG/1
650 TABLET ORAL EVERY 4 HOURS PRN
Status: DISCONTINUED | OUTPATIENT
Start: 2021-07-01 | End: 2021-07-02

## 2021-07-01 RX ORDER — HYDROCODONE BITARTRATE AND ACETAMINOPHEN 5; 325 MG/1; MG/1
1 TABLET ORAL EVERY 6 HOURS PRN
Status: DISCONTINUED | OUTPATIENT
Start: 2021-07-01 | End: 2021-07-08 | Stop reason: HOSPADM

## 2021-07-01 RX ORDER — SODIUM CHLORIDE 9 MG/ML
INJECTION, SOLUTION INTRAVENOUS PRN
Status: DISCONTINUED | OUTPATIENT
Start: 2021-07-02 | End: 2021-07-02

## 2021-07-01 RX ADMIN — POLYETHYLENE GLYCOL (3350) 17 G: 17 POWDER, FOR SOLUTION ORAL at 10:55

## 2021-07-01 RX ADMIN — SODIUM CHLORIDE, PRESERVATIVE FREE 10 ML: 5 INJECTION INTRAVENOUS at 20:52

## 2021-07-01 RX ADMIN — ACETAMINOPHEN 650 MG: 325 TABLET ORAL at 15:12

## 2021-07-01 RX ADMIN — LEVETIRACETAM 500 MG: 100 INJECTION INTRAVENOUS at 10:55

## 2021-07-01 RX ADMIN — CALCIUM CARBONATE-VITAMIN D TAB 500 MG-200 UNIT 1 TABLET: 500-200 TAB at 10:54

## 2021-07-01 RX ADMIN — LEVOTHYROXINE SODIUM 100 MCG: 0.1 TABLET ORAL at 06:24

## 2021-07-01 RX ADMIN — DOCUSATE SODIUM 100 MG: 100 CAPSULE, LIQUID FILLED ORAL at 20:50

## 2021-07-01 RX ADMIN — SERTRALINE 50 MG: 50 TABLET, FILM COATED ORAL at 10:54

## 2021-07-01 RX ADMIN — DOCUSATE SODIUM 100 MG: 100 CAPSULE, LIQUID FILLED ORAL at 10:55

## 2021-07-01 RX ADMIN — DEXAMETHASONE SODIUM PHOSPHATE 4 MG: 4 INJECTION, SOLUTION INTRA-ARTICULAR; INTRALESIONAL; INTRAMUSCULAR; INTRAVENOUS; SOFT TISSUE at 18:42

## 2021-07-01 RX ADMIN — LEVETIRACETAM 500 MG: 100 INJECTION INTRAVENOUS at 22:23

## 2021-07-01 RX ADMIN — SODIUM CHLORIDE, PRESERVATIVE FREE 10 ML: 5 INJECTION INTRAVENOUS at 11:00

## 2021-07-01 RX ADMIN — SERTRALINE 50 MG: 50 TABLET, FILM COATED ORAL at 20:51

## 2021-07-01 RX ADMIN — CALCIUM CARBONATE-VITAMIN D TAB 500 MG-200 UNIT 1 TABLET: 500-200 TAB at 20:51

## 2021-07-01 RX ADMIN — PANTOPRAZOLE SODIUM 40 MG: 40 TABLET, DELAYED RELEASE ORAL at 06:24

## 2021-07-01 RX ADMIN — Medication 1 TABLET: at 10:54

## 2021-07-01 RX ADMIN — GADOTERIDOL 10 ML: 279.3 INJECTION, SOLUTION INTRAVENOUS at 09:40

## 2021-07-01 RX ADMIN — CETIRIZINE HYDROCHLORIDE 10 MG: 10 TABLET, FILM COATED ORAL at 10:54

## 2021-07-01 RX ADMIN — ATORVASTATIN CALCIUM 10 MG: 10 TABLET, FILM COATED ORAL at 20:50

## 2021-07-01 ASSESSMENT — PAIN DESCRIPTION - PAIN TYPE: TYPE: ACUTE PAIN

## 2021-07-01 ASSESSMENT — ENCOUNTER SYMPTOMS
SORE THROAT: 0
DIARRHEA: 0
CHOKING: 0
BLOOD IN STOOL: 0
CHEST TIGHTNESS: 0
WHEEZING: 0
BACK PAIN: 0
RHINORRHEA: 0
VOMITING: 0
ABDOMINAL DISTENTION: 0
NAUSEA: 0
CONSTIPATION: 0
ABDOMINAL PAIN: 0
COUGH: 0
TROUBLE SWALLOWING: 0
SHORTNESS OF BREATH: 0
PHOTOPHOBIA: 0
COLOR CHANGE: 0

## 2021-07-01 ASSESSMENT — PAIN SCALES - GENERAL
PAINLEVEL_OUTOF10: 0
PAINLEVEL_OUTOF10: 6
PAINLEVEL_OUTOF10: 6
PAINLEVEL_OUTOF10: 0

## 2021-07-01 ASSESSMENT — PAIN - FUNCTIONAL ASSESSMENT: PAIN_FUNCTIONAL_ASSESSMENT: ACTIVITIES ARE NOT PREVENTED

## 2021-07-01 ASSESSMENT — PAIN DESCRIPTION - DESCRIPTORS: DESCRIPTORS: HEADACHE

## 2021-07-01 ASSESSMENT — PAIN DESCRIPTION - FREQUENCY: FREQUENCY: INTERMITTENT

## 2021-07-01 ASSESSMENT — PAIN DESCRIPTION - LOCATION: LOCATION: HEAD

## 2021-07-01 NOTE — PROGRESS NOTES
Alesha Zarate  Daily Progress Note    Pt Name: Judson Herrera Record Number: [id number]  Date of Birth 5/29/1933   Today's Date: 7/1/2021    HD: # 3    CC: \"okay\"    ASSESSMENT  1. Active Hospital Problems    Diagnosis Date Noted    Glioblastoma multiforme ([address] [C71.9]     Brain edema ([address] [G93.6]     Fall [W19. XXXA] 06/28/2021    Traumatic ecchymosis of forehead [S00.83XA] 06/28/2021    Right parietal lobe mass [G93.89] 06/28/2021    Subarachnoid hemorrhage following injury, no loss of consciousness ([address] [S06.6X0A] 06/28/2021    Traumatic ecchymosis of left lower leg [S80.12XA] 06/28/2021    Traumatic ecchymosis of left forearm [S50.12XA] 06/28/2021    Platelet inhibition due to Plavix [Z79.02] 06/28/2021    Fall at home, initial encounter [W19. Christiano Soren, T11.229] 06/28/2021         PLAN  Admit to the ICU under Trauma Services   - OK to transfer out of ICU to step-down     Trauma by frequent falls              - Fall precautions              - PT, OT continue to treat     Right parietoccipital lobe mass with mass effect and midline shift              - Neurosurgery assisting with management, plan for surgery on tomorrow              - MRI of the brain: High grade glioma, glioblastoma multiform, favored              - Intensivist assisting with surgical clearance   - Dexamethasone continues   - Repeat MRI this AM: stable     Subarachnoid and parenchymal hemorrhage              - Neurosurgery managing small ICH non operatively              - TXA and platelets given 1/73              - Keppra 1,000mg loading dose given 6/28; 500mg BID thereafter              - Repeat head CT 6/29: stable              - Neuro checks              - Maintain SBP <160 >100              - Seizure precautions               - Plavix and Aspirin held   - Repeat MRI this AM: stable     Traumatic ecchymosis to left lower leg and left forearm, right forehead ecchymosis - Plavix and aspirin held     Bilateral lower extremity weakness              - Up with assistance              - PT, OT continue to treat              - Fall precautions     Hx of hypothyroidism, anxiety, GERD, SVT, HLD, steroid-induced hyperglycemia, breast cancer with mets to the liver              - Home meds resumed              - Accuchecks ACHS with sliding scale insulin              - Intensivist assisting with medical management     Pain control              - Tylenol PRN              - Lidoderm patches     General diet  Stop IVF hydration  Repeat labs in AM  Prophylaxis: SCDs, IS, C&DB, Pepcid, stool softeners  PT, OT, SLP continue to treat  DNR-CCA     Discharge disposition pending clinical course   - PM&R has been consulted   - Patient stable from trauma surgery perspective. Plan for intensivst service to assume care and trauma surgery to sign off. SUBJECTIVE  Patient seen 3A this AM.  Patient sitting in bedside recliner in no acute distress. Patient denied having any pain or complaints. She denied having headaches, lightheadedness, dizziness, neck pain, back pain, chest pain, shortness breath, abdominal pain, nausea/vomiting, pain in extremities, and paresthesias. Repeat MRI obtained this morning with no reported changes from previous. Repeat labs obtained this morning reassuring. Hemoglobin stable at 12.9. No leukocytosis noted. Patient afebrile, vital signs stable. Patient remains stable from a trauma surgery perspective. Neurosurgery plans for surgical intervention tomorrow for right craniectomy for surgical resection/debulking of right sided intracerebral tumor. Case discussed with intensivist NP. Plan for trauma surgery to transfer care to intensivist service and sign off. Case discussed with trauma surgeon, Dr. Gabriele Talamantes.     Wt Readings from Last 3 Encounters:   06/30/21 130 lb 11.7 oz (59.3 kg)   06/15/21 137 lb 4 oz (62.3 kg)   05/25/21 136 lb (61.7 kg)     Temp Readings from Last 3 Encounters:   07/01/21 98.6 °F (37 °C) (Oral)   06/15/21 96.6 °F (35.9 °C) (Skin)   05/25/21 97.6 °F (36.4 °C) (Skin)     BP Readings from Last 3 Encounters:   07/01/21 104/84   06/15/21 112/70   05/25/21 (!) 116/58     Pulse Readings from Last 3 Encounters:   07/01/21 59   06/15/21 76   05/25/21 72       24 HR INTAKE/OUTPUT :     Intake/Output Summary (Last 24 hours) at 7/1/2021 1127  Last data filed at 7/1/2021 1010  Gross per 24 hour   Intake 3218.5 ml   Output 0 ml   Net 3218.5 ml     Diet NPO    OBJECTIVE  CURRENT VITALS /84   Pulse 59   Temp 98.6 °F (37 °C) (Oral)   Resp (!) 32   Ht 5' (1.524 m)   Wt 130 lb 11.7 oz (59.3 kg)   LMP  (LMP Unknown)   SpO2 95%   BMI 25.53 kg/m²   GENERAL: alert, cooperative, no distress, sitting in bedside recliner  NEURO: Awake, alert and oriented. PERRL. Conversing fluently and appropriately. Follows commands. Moves all 4 extremities freely. No signs of focal neurological deficits. LUNGS: clear to auscultation bilaterally- no wheezes, rales or rhonchi, normal air movement, no respiratory distress  HEART: normal rate, normal S1 and S2, no gallops, intact distal pulses  ABDOMEN: soft, non-tender, non-distended, normal bowel sounds, no masses or organomegaly  WOUNDS: Ecchymosis to right side of forehead and around right eye. Scattered ecchymosis in various stages of healing to extremities. EXTREMITY: no cyanosis, no clubbing and no edema      LABS  CBC :   Recent Labs     06/29/21  0405 07/01/21  1033   WBC 8.8 10.1   HGB 12.9 12.9   HCT 42.6 42.1   MCV 98.8 96.3     --      BMP:   Recent Labs     06/29/21  0405 07/01/21  1033    140   K 4.6 4.1    108   CO2 25 25   BUN 15 20   CREATININE 0.7 0.7     COAGS:   Recent Labs     06/29/21  0405 07/01/21  1033   PROT 6.1 6.2     Pancreas/HFP:  No results for input(s): LIPASE, AMYLASE in the last 72 hours.   Recent Labs     06/29/21  0405 07/01/21  1033   AST 29 29   ALT 18 21   BILITOT 0.4 0. 4   ALKPHOS 44 42     RADIOLOGY:    Narrative   PROCEDURE: MRI BRAIN W CONTRAST       CLINICAL INFORMATION: [place] protocol .       COMPARISON: MR brain dated 6/28/2021.       TECHNIQUE: Axial 3-D T1 weighted radha protocol images were obtained through the brain before and after the administration of 10 mL ProHance injected in the right forearm. Fiducial markers are in place. Sagittal and coronal reconstructions were    created.       FINDINGS:    Redemonstration of a multilobulated T1 hypointense mass centered at the temporal occipital junction on the right with prominent nodular and irregular curvilinear peripheral enhancement along with thickened enhancing septations measuring 5.5 x 3.8 cm in    greatest axial dimensions, not significantly changed compared to prior MRI. There is prominent adjacent hypointense T1 signal. Redemonstration of prominent mass effect on the atrium and occipital horn of the right lateral ventricle with approximately 1    cm leftward midline shift, stable compared to prior exam.           Impression    Redemonstration of a 5.5 cm irregularly enhancing intra-axial mass at the right temporal occipital junction as evidence for high-grade neoplasm with stable 1 cm leftward midline shift for surgical planning.                   **This report has been created using voice recognition software. It may contain minor errors which are inherent in voice recognition technology. **       Final report electronically signed by Dr. Jason Martinez MD on 7/1/2021 10:12 AM     Narrative   PROCEDURE: XR CHEST 1 VIEW       CLINICAL INFORMATION: preop.       COMPARISON: Chest x-ray dated 4 May 2021.       TECHNIQUE: AP upright view of the chest.       FINDINGS:           There is borderline cardiomegaly. There is atherosclerotic calcification in the aortic arch. There is a stent at the origin of the left subclavian artery.  There is slightly increased density in the right paratracheal space.  There are no pulmonary    infiltrates or effusions. The pulmonary vascularity is normal.   There are degenerative changes involving the acromioclavicular joints bilaterally and glenohumeral joints. .           Impression   1. No interval change since previous study dated for May 2021, no acute cardiopulmonary disease.           **This report has been created using voice recognition software. It may contain minor errors which are inherent in voice recognition technology. **       Final report electronically signed by DR Rosana Cool on 7/1/2021 10:24 AM           Electronically signed by Akbar Chapman PA-C on 7/1/2021 at 11:27 AM Patient seen and examined independently by me. Above discussed and I agree with CNP. Labs, cultures, and radiographs where available were reviewed. See orders for the updated patient care plan.     Rohan Morton MD MD, chart reviewed patient stable and has elected to proceed with neurosurgical tumor excision/debulking no other trauma issues at this time appreciate medicine willing to take over as primary will see as needed  7/1/2021   9:57 PM

## 2021-07-01 NOTE — CARE COORDINATION
7/1/21, 9:48 AM EDT    DISCHARGE ON [facility] [address] [facility] day: 3  Location: -11/011-A Reason for admit: Fall at home, initial encounter [W19. Narinder , I64.998]   Procedure:   6/28 CT Head/Facial bones/Chest/Abd/Pelvis: See injuries  6/28 CT Cervical/Thoracic/Lumbar spine: See injuries  6/28 MRI Brain:  Heterogeneous 5.5 cm nodular peripherally enhancing cystic and solid mass centered at the right occipital/temporal lobe junction with prominent adjacent T2/FLAIR prolongation involving the corpus callosum, temporal, parietal and frontal lobes with prominent mass effect on the right lateral ventricle and 9 mm leftward midline shift. 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  No significant change since yesterday.  Less likely is recent infarct. Planning OR Friday by Dr Sousa Pass  Barriers to Discharge: cont Decadron / Keppra IV, PT/OT, SLP, neurosurgery and hospitalist follows, ISS, hold [address] and DVT prophylaxis for OR. NPO after MN, consent. Hgb 12.9. PCP: Mark Carballo MD  Readmission Risk Score: 20%  Patient Goals/Plan/Treatment Preferences: IP rehab consulted; following post op. PTA patient was home alone with [facility] for [facility].

## 2021-07-01 NOTE — PROGRESS NOTES
[address] THERAPY  [facility] CCU 3A  DAILY NOTE    TIME   SLP Individual Minutes  Time In: 5514  Time Out: 0230  Minutes: 10  Timed Code Treatment Minutes: 10 Minutes       Date: 2021  Patient Name: Lida Walter      CSN: [id number]   : 1933  (80 y.o.)  Gender: female   Referring Physician:  Laine Pereira APRN - CNP  Diagnosis: Fall at home, initial encounter  Secondary Diagnosis: Dysphagia, cognitive deficits  Precautions: Fall risk, aspiration precautions, low stimulation  Current Diet: Advanced to regular diet and thin liquids 21   Swallowing Strategies: Full Upright Position, Small Bite/Sip, Pulmonary Monitoring, Oral Care after all Meals, [place] supervision, Alternate Solids and Liquids, Limit Distractions and Monitor for Fatigue       Date of Last MBS/FEES: Not Applicable    Pain:  No pain reported. Subjective:  YADY Rivera with approval for completion of cognitive treatment. Patient seated in recliner upon ST arrival. Alert and pleasantly engaged in given tasks. Short-Term Goals:  SHORT TERM GOAL #1:  Goal 1: Pt will safely consume a regular diet and thin liquids (intermittent supervision) without overt s/s of aspiration or pulmonary compromise to meet nutritional/hydration measures. INTERVENTIONS: Not addressed due to focus on cognition (diet level upgraded during previous session). SHORT TERM GOAL #2:  Goal 2: Pt will complete functional immediate/delayed recall tasks (inclusion of compensatory strategies) with 60% accuracy, mod cues to improve retention of pertinent information. INTERVENTIONS: Orientation - 2/5 indep, 1/5 self corrected, 2/5 min cues    SHORT TERM GOAL #3:  Goal 3: Pt will complete problem solving, verbal/visual reasoning, and executive functioning tasks (time, basic money/math/medications) with 60% accuracy, mod cues to improve contribution within ADLs/IADLs.   INTERVENTIONS: Telling time on clocks  - 12 indep, 2/12 min cues, 3/12 mod cues  *left neglect impacting overall success  *slow processing speed     ID call light - indep  ID rationale for call light - indep    SHORT TERM GOAL #4:  Goal 4: Pt will complete sequencing and thought organization tasks with 70% accuracy, mod cues to improve mental flexibility and processing speed. INTERVENTIONS: Verbal sequencing task (washing dishes) - fair success with limited detail and poor verbal sequencing    SHORT TERM GOAL #5:  Goal 5: Pt will complete selective attention tasks (focus on the left) with no more than 5 errors until task completion to optimize reading comprehension and visual scanning requried for ADLs. INTERVENTIONS: Not addressed due to focus on other goals. SHORT TERM GOAL #6:  Goal 6: Pt and family will exhibit return demonstration for implementation of low stimulation guidelines/protocol given concerns within acute intracranial findings. INTERVENTIONS: Reviewed low stimulation recommendations. Patient receptive, stating, \"yes I need these lights off to nap too\". Will require further education. Long-Term Goals:  No LTG established given short ELOS     EDUCATION:  Learner: Patient  Education:  Reviewed ST goals and Plan of Care, Reviewed recommendations for follow-up, Education Related to Potential Risks and Complications Due to Impairment/Illness/Injury and Education Related to Prevention of Recurrence of Impairment/Illness/Injury  Evaluation of Education: Verbalizes understanding, Demonstrates with assistance and Needs further instruction    ASSESSMENT/PLAN:  Activity Tolerance:  Patient tolerance of treatment: good. Cooperative with ST and POC. Assessment/Plan: Patient progressing toward established goals. Continues to require skilled care of licensed speech pathologist to progress toward achievement of established goals and plan of care.      Plan for Next Session: left inattention, recall        Nav Goodson M.S. [address] [place]

## 2021-07-01 NOTE — PROGRESS NOTES
CRITICAL CARE PROGRESS NOTE      Patient:  Deborah Virk    Unit/Bed:3A-07/007-A  YOB: 1933  MRN: [id number]   PCP: Shola Mcknight MD  Date of Admission: 6/28/2021  Chief Complaint:-   Chief Complaint   Patient presents with    Trauma    Fall        Assessment and Plan:    Fall: History of frequent falls over the past couple months, had not been evaluated for this till arrival.  Found to have intracranial hemorrhage surrounding brain mass. Subarchnoid hemorrhage: Trauma versus hemorrhagic mass. Status post TPA. SBP between 100-160. Repeat TEG pending  Brain mass:  Right ocipita and pariatal brain mass with hemorrhage and necrosis. 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 Seizure prophylaxis, Decadron. Plan for OR on Friday. N.p.o. at midnight. 2 units held for OR. TEG pending  Hx Temporal arteritis: On Decadron. Has prosthetic right eye. HTN: Hydralazine ordered  HLD: On Lipitor  Hypothyroidism : Synthroid ordered  Hx of breast cancer: CT abdomen and pelvis not significant for suspicious lesions   Fluids: None, tolerating p.o. 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 Patient has had frequent falls over the past month, had a fall when trying to go to the bathroom, striking her right forehead on the nightstand. Patient denies loss of consciousness. Unable to get up due to generalized weakness. Home health provider arrived, called EMS. Patient was level 2 trauma due to Plavix and head trauma. CT found to have spiculated right-sided brain mass, subarachnoid hemorrhage.   Admitted to trauma due to element of fall and presence of head bleed. Plan to go to the OR 7/2 for debulking. 7/1 - no complaints. Patient aware of plan to go to the OR tomorrow. Spoke with Dr. Amber Crowe, who requested tagged to be done today, 2 units on hold for the OR tomorrow, n.p.o. at midnight. Past Medical History:    Past Medical History:   Diagnosis Date    Anxiety     Blind right eye     Breast cancer metastasized to liver ([facility] 05/10/2016    Liver lesion noted     Carotid stenosis      Left ICA 25%    Colostomy in place ([facility] 05/27/2016    Degenerative arthritis of cervical spine 5/07    GERD (gastroesophageal reflux disease) 11/06    Hypercholesteremia     Hypoestrogenism     Hypothyroidism     Lumbago     Lumbosacral spinal stenosis 05/2019    MDRO (multiple drug resistant organisms) resistance 2008    pt stated cleared    Personal history of cardiac dysrhythmia     Sigmoid diverticulosis 8/04    Spondylolisthesis of lumbosacral region 8/04    Steroid-induced hyperglycemia     Subclavian artery stenosis (HCC)     left    Superior and Inferior Pubic ramus fracture, right, closed, initial encounter ([facility] 05/21/2019    SVT (supraventricular tachycardia) ([facility] 6/07    Temporal arteritis (HCC)     Vertebral artery occlusion     left    Vitamin D deficiency 06/2017      Family History:    Family History   Problem Relation Age of Onset    Cancer Sister 61        breast    Cancer Brother 79        lung    Cancer Brother 68        colon    Cancer Son 48        lung      Social History:    Social History       Tobacco History       Smoking Status  Former Smoker Smoking Frequency  0.5 packs/day Smoking Tobacco Type  Cigarettes      Smokeless Tobacco Use  Never Used              Alcohol History       Alcohol Use Status  No              Drug Use       Drug Use Status  No              Sexual Activity       Sexually Active  Never                 . ROS Review of Systems   Constitutional: Positive for fatigue (improving).  Negative for chills, diaphoresis and fever. HENT: Negative for rhinorrhea, sore throat and trouble swallowing. Eyes: Negative for photophobia and visual disturbance. Respiratory: Negative for cough, choking, chest tightness, shortness of breath and wheezing. Cardiovascular: Negative for chest pain, palpitations and leg swelling. Gastrointestinal: Negative for abdominal distention, abdominal pain, blood in stool, constipation, diarrhea, nausea and vomiting. Genitourinary: Negative for decreased urine volume, difficulty urinating, dysuria, flank pain, frequency and urgency. Musculoskeletal: Negative for arthralgias, back pain, myalgias, neck pain and neck stiffness. Skin: Negative for color change and rash. Neurological: Positive for weakness (generalized. Improving) and light-headedness. Negative for syncope and numbness.         Scheduled Meds:   [START ON 7/2/2021] aminolevulinic acid  20 mg/kg Oral Once    calcium-vitamin D  1 tablet Oral BID    isosorbide mononitrate  30 mg Oral Daily    levothyroxine  100 mcg Oral Daily    cetirizine  10 mg Oral Daily    multivitamin  1 tablet Oral Daily    pantoprazole  40 mg Oral QAM AC    sertraline  50 mg Oral BID    atorvastatin  10 mg Oral Nightly    sodium chloride flush  5-40 mL Intravenous 2 times per day    polyethylene glycol  17 g Oral Daily    lidocaine  2 patch Topical Daily    levetiracetam  500 mg Intravenous Q12H    insulin lispro  0-6 Units Subcutaneous TID WC    insulin lispro  0-3 Units Subcutaneous Nightly    docusate sodium  100 mg Oral BID    dexamethasone  4 mg Intravenous Daily     Continuous Infusions:   [START ON 7/2/2021] sodium chloride      sodium chloride      sodium chloride      dextrose         PHYSICAL EXAMINATION:  Vitals:    07/01/21 0400 07/01/21 0430 07/01/21 0745 07/01/21 0800   BP:   (!) 149/48    Pulse: 66 62 63 65   Resp: 22 16 18 24   Temp:   98.5 °F (36.9 °C)    TempSrc:   Oral    SpO2: 93% 92%     Weight:       Height: In: 2277.7   Out: -      Body mass index is 25.53 kg/m². GCS:   No data recorded         Physical Exam  HENT:      Head: Normocephalic. Right Ear: External ear normal. There is impacted cerumen. Left Ear: External ear normal. There is impacted cerumen. Nose: Nose normal. No rhinorrhea. Mouth/Throat:      Mouth: Mucous membranes are moist.      Pharynx: Oropharynx is clear. Eyes:      Conjunctiva/sclera: Conjunctivae normal.      Comments: Disconjugate gaze, right prosthetic eye. Left eye has very minimal reaction to light   Neck:      Vascular: No carotid bruit. Cardiovascular:      Rate and Rhythm: Normal rate and regular rhythm. Pulses: Normal pulses. Heart sounds: Normal heart sounds. No murmur heard. No gallop. Pulmonary:      Effort: Pulmonary effort is normal. No respiratory distress. Breath sounds: Normal breath sounds. No wheezing or rales. Chest:      Chest wall: No tenderness. Abdominal:      General: Abdomen is flat. Bowel sounds are normal. There is no distension. Palpations: Abdomen is soft. Tenderness: There is no abdominal tenderness. Musculoskeletal:         General: No tenderness, deformity or signs of injury. Cervical back: Normal range of motion and neck supple. Right lower leg: No edema. Left lower leg: No edema. Skin:     General: Skin is warm and dry. Capillary Refill: Capillary refill takes less than 2 seconds. Findings: Bruising (right eyebrow, periorbital) present. Neurological:      Mental Status: She is alert. Data Review: (All radiographs, tracings, PFTs, and imaging are personally viewed and interpreted unless otherwise noted). CT HEAD WO CONTRAST   Final Result   Extensive right hemispheric abnormality is concerning for tumor, with mass    effect. No significant change since yesterday. Less likely is recent    infarct.       This document has been electronically signed by: Megan Sparrow MD    on 06/29/2021 08:15 AM      All CTs at this facility use dose modulation techniques and iterative    reconstructions, and/or weight-based dosing   when appropriate to reduce radiation to a low as reasonably achievable. US LIVER   Final Result   Impression:   Mild right hydronephrosis. This document has been electronically signed by: Nas Joel MD on    06/29/2021 01:28 AM      MRI BRAIN W WO CONTRAST   Final Result    Heterogeneous 5.5 cm nodular peripherally enhancing cystic and solid mass centered at the right occipital/temporal lobe junction with prominent adjacent T2/FLAIR prolongation involving the corpus callosum, temporal, parietal and frontal lobes with    prominent mass effect on the right lateral ventricle and 9 mm leftward midline shift. 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 **This report has been created using voice recognition software. It may contain minor errors which are inherent in voice recognition technology. **         Final report electronically signed by Dr. Kyle Phelps MD on 6/28/2021 12:45 PM      CT HEAD WO CONTRAST   Final Result   Mixed density mass in the right parietal occipital lobe with extensive surrounding white matter edema and minimal parenchymal and subarachnoid hemorrhage. There is midline shift 8 mm left to right. Compression of the right lateral ventricle. Minimal mass effect on the basilar cistern. This was called to Dr. Lalita Reyna at time of exam.            **This report has been created using voice recognition software. It may contain minor errors which are inherent in voice recognition technology. **      Final report electronically signed by Dr. Janiya Pham on 6/28/2021 9:56 AM      CT CERVICAL SPINE WO CONTRAST   Final Result    No evidence of acute osseous injury of the cervical spine. **This report has been created using voice recognition software.  It may contain minor errors which are inherent in voice recognition technology. **      Final report electronically signed by Dr. Cyndy Matthews MD on 6/28/2021 9:54 AM      CT CHEST W CONTRAST   Final Result   1. No acute traumatic intrathoracic findings. **This report has been created using voice recognition software. It may contain minor errors which are inherent in voice recognition technology. **      Final report electronically signed by Dr. Belkis Loza on 6/28/2021 10:00 AM      CT ABDOMEN PELVIS W IV CONTRAST Additional Contrast? None   Final Result   Multiple chronic findings. No acute abdominal or pelvic abnormality. **This report has been created using voice recognition software. It may contain minor errors which are inherent in voice recognition technology. **      Final report electronically signed by Dr. Arthur Wolfe on 6/28/2021 10:03 AM      CT LUMBAR RECONSTRUCTION WO POST PROCESS   Final Result       1. No evidence of acute osseous injury of the lumbar spine. 2. 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 3. Multilevel degenerative changes elsewhere. Please refer to the body of the report for segmental analysis. **This report has been created using voice recognition software. It may contain minor errors which are inherent in voice recognition technology. **      Final report electronically signed by Dr. Cyndy Matthews MD on 6/28/2021 10:00 AM      CT THORACIC RECONSTRUCTION WO POST PROCESS   Final Result   No acute abnormality of the thoracic spine. **This report has been created using voice recognition software. It may contain minor errors which are inherent in voice recognition technology. **      Final report electronically signed by Dr. Arthur Wolfe on 6/28/2021 10:06 AM      CT FACIAL BONES WO CONTRAST   Final Result    No evidence of acute osseous injury of the face. **This report has been created using voice recognition software. It may contain minor errors which are inherent in voice recognition technology. **      Final report electronically signed by Dr. Dirk Holland MD on 6/28/2021 10:04 AM      MRI BRAIN W CONTRAST    (Results Pending)   XR CHEST PORTABLE    (Results Pending)          CBC:   Recent Labs     06/28/21  0908 06/29/21  0405   WBC 8.5 8.8   HGB 12.1 12.9   HCT 39.1 42.6   MCV 95.4 98.8    156     BMP:   Recent Labs     06/28/21  0908 06/29/21  0405    138   K 4.1 4.6    102   CO2 28 25   BUN 19 15   CREATININE 0.6 0.7   GLUCOSE 81 106       CV  HR 65  /48  Telemetry shows normal sinus  Hg 12.9, 2 units held for OR. Pulm  CXR: pending  CT chest with contrast on arrival showed 3 mm nodule, unchanged from previous CT  Renal/lytes pending  CBC pending  Endocrine         1. Most recent Glu 99          Seen with multidisciplinary ICU team .  Meets Continued ICU Level Care Criteria:    [x] Yes   [] No - Transfer Planned to listed location:  [] HOSPITALIST CONTACTED-      Case and plan discussed with Dr. Knutson Forward        Electronically signed by Onelia De Jesus MD  Patient seen by me. Case discussed with resident physician. Patient for craniotomy and tumor resection. Patient will receive Rosalene Mole.     Electronically signed by Taiwo Agosto MD on 7/1/2021 at 12:31 PM

## 2021-07-01 NOTE — PLAN OF CARE
Problem: Pain:  Goal: Pain level will decrease  Description: Pain level will decrease  7/1/2021 1223 by Macey Ceballos RN  Outcome: Ongoing  Note: Pain Assessment: 0-10  Pain Level: 0   Patient's Stated Pain Goal: No pain   Is pain goal met at this time? Yes        Problem: Skin Integrity:  Goal: Will show no infection signs and symptoms  Description: Will show no infection signs and symptoms  7/1/2021 1223 by Macey Ceballos RN  Outcome: Ongoing  Note: Patient remains afebrile. Problem: Skin Integrity:  Goal: Absence of new skin breakdown  Description: Absence of new skin breakdown  7/1/2021 1223 by Macey Ceballos RN  Outcome: Ongoing  Note: Patient is able to turn self but is assisted by staff. Redness noted to buttocks and bilateral heels, areas are blanchable. Scattered skin tears, abrasions and bruising noted. Problem: Falls - Risk of:  Goal: Will remain free from falls  Description: Will remain free from falls  7/1/2021 1223 by Macey Ceballos RN  Outcome: Ongoing  Note: Patient remains on fall precautions. Falling star in place. Fall band on. Non-skid slippers on when ambulating. Side rails up x 2. Bed and chair alarm on. Call light within reach. Patient uses call light appropriately. Hourly rounding in place. Problem: Discharge Planning:  Goal: Discharged to appropriate level of care  Description: Discharged to appropriate level of care  7/1/2021 1223 by Macey Ceballos RN  Outcome: Ongoing  Note: Patient is from home, alone. Possible rehab at discharge. Problem: Metabolic:  Goal: Ability to maintain appropriate glucose levels will improve  Description: Ability to maintain appropriate glucose levels will improve  Outcome: Ongoing  Note: Patient is a chem ac/hs. Sliding scale coverage. Care plan reviewed with patient and family. Patient and family verbalize understanding of the plan of care and contribute to goal setting.

## 2021-07-01 NOTE — SIGNIFICANT EVENT
Extensive conversation with patient and family at bedside with Dr. Vasquez Akins. Risk and benefits were discussed of surgery versus nonsurgical interventions. Family understands that this is a palliative measure but could provide patient increased quality of life. Family and patient agreed to move forward with surgery. OR plans for Friday. Electronically signed by Linh Romero CNP on 7/1/2021 at 7:32 AM

## 2021-07-01 NOTE — PROGRESS NOTES
[address] [address]  INPATIENT PHYSICAL THERAPY  DAILY NOTE  [facility] CCU 3A - 3A-11/011-A    Time In: 2834  Time Out: 1110  Timed Code Treatment Minutes: 25 Minutes  Minutes: 25          Date: 2021  Patient Name: Natasha Pagan,  Gender:  female        MRN: [id number]  : 1933  (80 y.o.)     Referring Practitioner: PAMELA Nazario CNP  Diagnosis: Fall at home  Additional Pertinent Hx: Shereen Moncada is an 80year old female presenting to the Emergency Department via EMS for evaluation of potential injuries sustained when she fell in her home this morning around 5 am.  She reports that she was going back to her bed from her bathroom when her legs became weak and she fell. She is not sure if she hit her head on the nightstand but she states that the lamp that was on the nightstand fell on top of her, hitting the right side of her forehead/eye. She denies loss of consciousness. She was found by her home health aide around 0800 as she was not able to get herself up after falling. EMS reports that they have been to [facility] home multiple times over the last few weeks for lift assist but she refuses to go to the hospital for evaluation. Shereen Moncada reports that her legs have been progressively becoming more weak. She denies headache, neck pain, chest pain, abdominal pain. She does admit to lower back pain and lower extremity weakness. No visual changes, lightheadedness or dizziness, nausea or vomiting. She denies any feelings of syncope prior to falling.      Prior Level of Function:  Lives With: Alone  Type of Home: House  Home Layout: One level  Home Access: Level entry  Home Equipment: [employer], 4 wheeled walker, Cane, Lift chair, Alert Button   Bathroom Shower/Tub: Tub/Shower unit, Shower chair with back  [employer]: Standard (with BSC over toilet)  Bathroom Equipment: Shower chair, 3-in-1 commode    Receives Help From: Personal care attendant  ADL Assistance: Needs assistance  Homemaking Assistance: Needs assistance  Homemaking Responsibilities: Yes  Ambulation Assistance: Independent  Transfer Assistance: Independent  Active : No  Additional Comments: 2 hours/day DIVINA has an aide that assists with ADLs/IADLs. nieces live nearby and able to help at times. Restrictions/Precautions:  Restrictions/Precautions: General Precautions, Fall Risk  Position Activity Restriction  Other position/activity restrictions: L neglect, R artificial eye     SUBJECTIVE: RN approved session. Pt resting in bedside chair visiting with her niece. Pt pleasant and agreeable to therapy. Pt demo'd slow processing throughout session. PAIN: denied pain. States she is very tired    Vitals: Vitals not assessed per clinical judgement, see nursing flowsheet    OBJECTIVE:  Bed Mobility:  Not Tested    Transfers:  Sit to Stand: Minimal Assistance, Verbal and tactile cuing for hand placement. Tactile cuing for anterior weight shift. Stand to Sit:Contact Guard Assistance    Ambulation:  Minimal Assistance  Distance: 10 feet x1   Surface: Level Tile  Device:Rolling Walker  Gait Deviations:  Very slow sivan. Max cuing to keep AD closer and stay within QUIRINO. Pt running into multiple objects in room. Difficulty advancing and clearing L LE. Balance:  Static Standing Balance: Contact Guard Assistance    Exercise:  Patient was guided in 1 set(s) 10 reps of exercise to both lower extremities. Seated marches, Seated hamstring curls, Seated heel/toe raises, Long arc quads and Seated abduction/adduction. Exercises were completed for increased independence with functional mobility. Functional Outcome Measures: Completed  AM-PAC Inpatient Mobility Raw Score : 16  AM-PAC Inpatient T-Scale Score : 40.78    ASSESSMENT:  Assessment: Patient progressing toward established goals. and Pt tolerated session well. Limited by decreased strength, decreased endurance, decreased mobility.    Activity Tolerance:  Patient tolerance of

## 2021-07-01 NOTE — PLAN OF CARE
Problem: Pain:  Goal: Pain level will decrease  Description: Pain level will decrease  7/1/2021 0456 by Chaka Landa RN  Outcome: Ongoing   Patient denies pain at this time  Problem: Skin Integrity:  Goal: Will show no infection signs and symptoms  Description: Will show no infection signs and symptoms  7/1/2021 0456 by Chaka Landa RN  Outcome: Ongoing   Skin warm and dry. Scattered bruising and Right eye bruising  Problem: Falls - Risk of:  Goal: Will remain free from falls  Description: Will remain free from falls  7/1/2021 0456 by Chaka Landa RN  Outcome: Ongoing   Patient has frequent falls at home. Encouraged to use the call light and not to get up without staff. Bed locked and in low position. Call light within reach. Assistive devices utilized  Problem: Discharge Planning:  Goal: Discharged to appropriate level of care  Description: Discharged to appropriate level of care  7/1/2021 0456 by Chaka Landa RN  Outcome: Ongoing   Pending surgery Friday  Care plan reviewed with patient and verbalize understanding of the plan of care and contribute to goal setting.

## 2021-07-01 NOTE — PROGRESS NOTES
[facility] [employer] THERAPY MISSED TREATMENT NOTE  [facility] CCU 3A  3A-11/011-A      Date: 2021  Patient Name: Eamon Hobbs        CSN: [id number]   : 1933  (80 y.o.)  Gender: female   Referring Practitioner: PAMELA Jamison CNP  Diagnosis: Fall at [address]: Patient Refused family states patient has a headache and is waiting for medication, requesting therapy to return tomorrow morning.

## 2021-07-02 ENCOUNTER — APPOINTMENT (OUTPATIENT)
Dept: CT IMAGING | Age: 86
DRG: 025 | End: 2021-07-02
Payer: MEDICARE

## 2021-07-02 ENCOUNTER — ANESTHESIA (OUTPATIENT)
Dept: OPERATING ROOM | Age: 86
DRG: 025 | End: 2021-07-02
Payer: MEDICARE

## 2021-07-02 VITALS — DIASTOLIC BLOOD PRESSURE: 106 MMHG | OXYGEN SATURATION: 100 % | TEMPERATURE: 97.5 F | SYSTOLIC BLOOD PRESSURE: 182 MMHG

## 2021-07-02 LAB
ALBUMIN SERPL-MCNC: 3.7 G/DL (ref 3.5–5.1)
ALP BLD-CCNC: 38 U/L (ref 38–126)
ALT SERPL-CCNC: 19 U/L (ref 11–66)
ANION GAP SERPL CALCULATED.3IONS-SCNC: 11 MEQ/L (ref 8–16)
ANION GAP SERPL CALCULATED.3IONS-SCNC: 9 MEQ/L (ref 8–16)
APTT: 29.7 SECONDS (ref 22–38)
AST SERPL-CCNC: 26 U/L (ref 5–40)
BASE EXCESS (CALCULATED): -2.4 MMOL/L (ref -2.5–2.5)
BASOPHILS # BLD: 0.1 %
BASOPHILS ABSOLUTE: 0 THOU/MM3 (ref 0–0.1)
BILIRUB SERPL-MCNC: 0.4 MG/DL (ref 0.3–1.2)
BUN BLDV-MCNC: 17 MG/DL (ref 7–22)
BUN BLDV-MCNC: 19 MG/DL (ref 7–22)
CALCIUM IONIZED SERUM: 1.07 MMOL/L (ref 1.12–1.32)
CALCIUM SERPL-MCNC: 8.9 MG/DL (ref 8.5–10.5)
CALCIUM SERPL-MCNC: 9.6 MG/DL (ref 8.5–10.5)
CHLORIDE BLD-SCNC: 108 MEQ/L (ref 98–111)
CHLORIDE BLD-SCNC: 109 MEQ/L (ref 98–111)
CO2: 23 MEQ/L (ref 23–33)
CO2: 24 MEQ/L (ref 23–33)
COLLECTED BY:: ABNORMAL
CREAT SERPL-MCNC: 0.6 MG/DL (ref 0.4–1.2)
CREAT SERPL-MCNC: 0.8 MG/DL (ref 0.4–1.2)
EOSINOPHIL # BLD: 0.4 %
EOSINOPHILS ABSOLUTE: 0 THOU/MM3 (ref 0–0.4)
ERYTHROCYTE [DISTWIDTH] IN BLOOD BY AUTOMATED COUNT: 14.3 % (ref 11.5–14.5)
ERYTHROCYTE [DISTWIDTH] IN BLOOD BY AUTOMATED COUNT: 51.9 FL (ref 35–45)
GFR SERPL CREATININE-BSD FRML MDRD: 68 ML/MIN/1.73M2
GFR SERPL CREATININE-BSD FRML MDRD: > 90 ML/MIN/1.73M2
GLUCOSE BLD-MCNC: 154 MG/DL (ref 70–108)
GLUCOSE BLD-MCNC: 201 MG/DL (ref 70–108)
GLUCOSE BLD-MCNC: 88 MG/DL (ref 70–108)
GLUCOSE, WHOLE BLOOD: 116 MG/DL (ref 70–108)
HCO3: 22 MMOL/L (ref 23–28)
HCT VFR BLD CALC: 42 % (ref 37–47)
HEMOGLOBIN FINGERSTICK, POC: 11 G/DL (ref 12–16)
HEMOGLOBIN: 12.7 GM/DL (ref 12–16)
IMMATURE GRANS (ABS): 0.03 THOU/MM3 (ref 0–0.07)
IMMATURE GRANULOCYTES: 0.4 %
INR BLD: 0.97 (ref 0.85–1.13)
LYMPHOCYTES # BLD: 19.7 %
LYMPHOCYTES ABSOLUTE: 1.6 THOU/MM3 (ref 1–4.8)
MCH RBC QN AUTO: 30 PG (ref 26–33)
MCHC RBC AUTO-ENTMCNC: 30.2 GM/DL (ref 32.2–35.5)
MCV RBC AUTO: 99.3 FL (ref 81–99)
MONOCYTES # BLD: 4.7 %
MONOCYTES ABSOLUTE: 0.4 THOU/MM3 (ref 0.4–1.3)
NUCLEATED RED BLOOD CELLS: 0 /100 WBC
O2 SATURATION: 99 %
OSMOLALITY: 309 MOSMOL/KG (ref 275–295)
PCO2: 36 MMHG (ref 35–45)
PH BLOOD GAS: 7.4 (ref 7.35–7.45)
PLATELET # BLD: 146 THOU/MM3 (ref 130–400)
PMV BLD AUTO: 11.7 FL (ref 9.4–12.4)
PO2: 135 MMHG (ref 71–104)
POTASSIUM REFLEX MAGNESIUM: 4.4 MEQ/L (ref 3.5–5.2)
POTASSIUM SERPL-SCNC: 4.1 MEQ/L (ref 3.5–5.2)
POTASSIUM, WHOLE BLOOD: 3.2 MEQ/L (ref 3.5–4.9)
RBC # BLD: 4.23 MILL/MM3 (ref 4.2–5.4)
SEG NEUTROPHILS: 74.7 %
SEGMENTED NEUTROPHILS ABSOLUTE COUNT: 6.1 THOU/MM3 (ref 1.8–7.7)
SODIUM BLD-SCNC: 142 MEQ/L (ref 135–145)
SODIUM BLD-SCNC: 142 MEQ/L (ref 135–145)
SODIUM, WHOLE BLOOD: 140 MEQ/L (ref 138–146)
TOTAL PROTEIN: 5.9 G/DL (ref 6.1–8)
WBC # BLD: 8.1 THOU/MM3 (ref 4.8–10.8)

## 2021-07-02 PROCEDURE — 3600000006 HC SURGERY LEVEL 6 BASE: Performed by: NEUROLOGICAL SURGERY

## 2021-07-02 PROCEDURE — 85025 COMPLETE CBC W/AUTO DIFF WBC: CPT

## 2021-07-02 PROCEDURE — C1713 ANCHOR/SCREW BN/BN,TIS/BN: HCPCS | Performed by: NEUROLOGICAL SURGERY

## 2021-07-02 PROCEDURE — 2720000010 HC SURG SUPPLY STERILE: Performed by: NEUROLOGICAL SURGERY

## 2021-07-02 PROCEDURE — P9047 ALBUMIN (HUMAN), 25%, 50ML: HCPCS

## 2021-07-02 PROCEDURE — 61510 CRNEC TREPH EXC BRN TUM STTL: CPT | Performed by: NEUROLOGICAL SURGERY

## 2021-07-02 PROCEDURE — 6360000002 HC RX W HCPCS: Performed by: PHYSICIAN ASSISTANT

## 2021-07-02 PROCEDURE — 2500000003 HC RX 250 WO HCPCS: Performed by: NURSE ANESTHETIST, CERTIFIED REGISTERED

## 2021-07-02 PROCEDURE — 7100000001 HC PACU RECOVERY - ADDTL 15 MIN: Performed by: NEUROLOGICAL SURGERY

## 2021-07-02 PROCEDURE — 6370000000 HC RX 637 (ALT 250 FOR IP): Performed by: NEUROLOGICAL SURGERY

## 2021-07-02 PROCEDURE — 6360000002 HC RX W HCPCS

## 2021-07-02 PROCEDURE — 2500000003 HC RX 250 WO HCPCS: Performed by: ANESTHESIOLOGY

## 2021-07-02 PROCEDURE — 2709999900 HC NON-CHARGEABLE SUPPLY: Performed by: NEUROLOGICAL SURGERY

## 2021-07-02 PROCEDURE — 6370000000 HC RX 637 (ALT 250 FOR IP): Performed by: NURSE PRACTITIONER

## 2021-07-02 PROCEDURE — 2580000003 HC RX 258: Performed by: PHYSICIAN ASSISTANT

## 2021-07-02 PROCEDURE — 82947 ASSAY GLUCOSE BLOOD QUANT: CPT

## 2021-07-02 PROCEDURE — 3700000000 HC ANESTHESIA ATTENDED CARE: Performed by: NEUROLOGICAL SURGERY

## 2021-07-02 PROCEDURE — 36415 COLL VENOUS BLD VENIPUNCTURE: CPT

## 2021-07-02 PROCEDURE — 70450 CT HEAD/BRAIN W/O DYE: CPT

## 2021-07-02 PROCEDURE — 6360000002 HC RX W HCPCS: Performed by: NURSE ANESTHETIST, CERTIFIED REGISTERED

## 2021-07-02 PROCEDURE — 85610 PROTHROMBIN TIME: CPT

## 2021-07-02 PROCEDURE — 99232 SBSQ HOSP IP/OBS MODERATE 35: CPT | Performed by: INTERNAL MEDICINE

## 2021-07-02 PROCEDURE — 2500000003 HC RX 250 WO HCPCS: Performed by: NEUROLOGICAL SURGERY

## 2021-07-02 PROCEDURE — 88331 PATH CONSLTJ SURG 1 BLK 1SPC: CPT

## 2021-07-02 PROCEDURE — 2000000000 HC ICU R&B

## 2021-07-02 PROCEDURE — 85018 HEMOGLOBIN: CPT

## 2021-07-02 PROCEDURE — 88377 M/PHMTRC ALYS ISHQUANT/SEMIQ: CPT

## 2021-07-02 PROCEDURE — 2780000010 HC IMPLANT OTHER: Performed by: NEUROLOGICAL SURGERY

## 2021-07-02 PROCEDURE — 3700000001 HC ADD 15 MINUTES (ANESTHESIA): Performed by: NEUROLOGICAL SURGERY

## 2021-07-02 PROCEDURE — 84132 ASSAY OF SERUM POTASSIUM: CPT

## 2021-07-02 PROCEDURE — 61781 SCAN PROC CRANIAL INTRA: CPT | Performed by: NEUROLOGICAL SURGERY

## 2021-07-02 PROCEDURE — 61510 CRNEC TREPH EXC BRN TUM STTL: CPT | Performed by: PHYSICIAN ASSISTANT

## 2021-07-02 PROCEDURE — 93005 ELECTROCARDIOGRAM TRACING: CPT | Performed by: NURSE PRACTITIONER

## 2021-07-02 PROCEDURE — 2580000003 HC RX 258: Performed by: NEUROLOGICAL SURGERY

## 2021-07-02 PROCEDURE — 87086 URINE CULTURE/COLONY COUNT: CPT

## 2021-07-02 PROCEDURE — 84295 ASSAY OF SERUM SODIUM: CPT

## 2021-07-02 PROCEDURE — 82330 ASSAY OF CALCIUM: CPT

## 2021-07-02 PROCEDURE — C1776 JOINT DEVICE (IMPLANTABLE): HCPCS | Performed by: NEUROLOGICAL SURGERY

## 2021-07-02 PROCEDURE — 88307 TISSUE EXAM BY PATHOLOGIST: CPT

## 2021-07-02 PROCEDURE — 7100000000 HC PACU RECOVERY - FIRST 15 MIN: Performed by: NEUROLOGICAL SURGERY

## 2021-07-02 PROCEDURE — 6370000000 HC RX 637 (ALT 250 FOR IP): Performed by: PHYSICIAN ASSISTANT

## 2021-07-02 PROCEDURE — 6360000002 HC RX W HCPCS: Performed by: NEUROLOGICAL SURGERY

## 2021-07-02 PROCEDURE — 88360 TUMOR IMMUNOHISTOCHEM/MANUAL: CPT

## 2021-07-02 PROCEDURE — 83930 ASSAY OF BLOOD OSMOLALITY: CPT

## 2021-07-02 PROCEDURE — 3600000016 HC SURGERY LEVEL 6 ADDTL 15MIN: Performed by: NEUROLOGICAL SURGERY

## 2021-07-02 PROCEDURE — 80053 COMPREHEN METABOLIC PANEL: CPT

## 2021-07-02 PROCEDURE — 2500000003 HC RX 250 WO HCPCS

## 2021-07-02 PROCEDURE — 85730 THROMBOPLASTIN TIME PARTIAL: CPT

## 2021-07-02 PROCEDURE — 82803 BLOOD GASES ANY COMBINATION: CPT

## 2021-07-02 PROCEDURE — 2580000003 HC RX 258: Performed by: NURSE ANESTHETIST, CERTIFIED REGISTERED

## 2021-07-02 PROCEDURE — 82948 REAGENT STRIP/BLOOD GLUCOSE: CPT

## 2021-07-02 PROCEDURE — P9045 ALBUMIN (HUMAN), 5%, 250 ML: HCPCS | Performed by: NURSE ANESTHETIST, CERTIFIED REGISTERED

## 2021-07-02 DEVICE — DURAGEN® SUTURABLE DURAL REGENERATION MATRIX, 3 IN X 3 IN (7.5 CM X 7.5 CM)
Type: IMPLANTABLE DEVICE | Site: BRAIN | Status: FUNCTIONAL
Brand: DURAGEN® SUTURABLE

## 2021-07-02 DEVICE — PLATE BONE W53XL85MM THK0.3MM CRANIOMAXILLOFACIAL TI PNL: Type: IMPLANTABLE DEVICE | Site: BRAIN | Status: FUNCTIONAL

## 2021-07-02 DEVICE — SCREW BNE L4MM DIA1.5MM CRAN SELF DRL CRSS DRV THINFLAP: Type: IMPLANTABLE DEVICE | Site: BRAIN | Status: FUNCTIONAL

## 2021-07-02 RX ORDER — FENTANYL CITRATE 50 UG/ML
INJECTION, SOLUTION INTRAMUSCULAR; INTRAVENOUS PRN
Status: DISCONTINUED | OUTPATIENT
Start: 2021-07-02 | End: 2021-07-02 | Stop reason: SDUPTHER

## 2021-07-02 RX ORDER — MANNITOL 250 MG/ML
25 INJECTION, SOLUTION INTRAVENOUS EVERY 8 HOURS
Status: DISCONTINUED | OUTPATIENT
Start: 2021-07-02 | End: 2021-07-02

## 2021-07-02 RX ORDER — ESMOLOL HYDROCHLORIDE 10 MG/ML
INJECTION INTRAVENOUS PRN
Status: DISCONTINUED | OUTPATIENT
Start: 2021-07-02 | End: 2021-07-02 | Stop reason: SDUPTHER

## 2021-07-02 RX ORDER — HYDROMORPHONE HCL 110MG/55ML
PATIENT CONTROLLED ANALGESIA SYRINGE INTRAVENOUS PRN
Status: DISCONTINUED | OUTPATIENT
Start: 2021-07-02 | End: 2021-07-02 | Stop reason: SDUPTHER

## 2021-07-02 RX ORDER — SODIUM CHLORIDE 9 MG/ML
25 INJECTION, SOLUTION INTRAVENOUS PRN
Status: DISCONTINUED | OUTPATIENT
Start: 2021-07-02 | End: 2021-07-08 | Stop reason: HOSPADM

## 2021-07-02 RX ORDER — CALCIUM CHLORIDE 100 MG/ML
INJECTION INTRAVENOUS; INTRAVENTRICULAR PRN
Status: DISCONTINUED | OUTPATIENT
Start: 2021-07-02 | End: 2021-07-02 | Stop reason: SDUPTHER

## 2021-07-02 RX ORDER — MORPHINE SULFATE 2 MG/ML
2 INJECTION, SOLUTION INTRAMUSCULAR; INTRAVENOUS
Status: DISCONTINUED | OUTPATIENT
Start: 2021-07-02 | End: 2021-07-03

## 2021-07-02 RX ORDER — CEFAZOLIN SODIUM 1 G/3ML
INJECTION, POWDER, FOR SOLUTION INTRAMUSCULAR; INTRAVENOUS PRN
Status: DISCONTINUED | OUTPATIENT
Start: 2021-07-02 | End: 2021-07-02 | Stop reason: SDUPTHER

## 2021-07-02 RX ORDER — ROCURONIUM BROMIDE 10 MG/ML
INJECTION, SOLUTION INTRAVENOUS PRN
Status: DISCONTINUED | OUTPATIENT
Start: 2021-07-02 | End: 2021-07-02 | Stop reason: SDUPTHER

## 2021-07-02 RX ORDER — MANNITOL 20 G/100ML
INJECTION, SOLUTION INTRAVENOUS PRN
Status: DISCONTINUED | OUTPATIENT
Start: 2021-07-02 | End: 2021-07-02 | Stop reason: SDUPTHER

## 2021-07-02 RX ORDER — EPHEDRINE SULFATE/0.9% NACL/PF 50 MG/5 ML
SYRINGE (ML) INTRAVENOUS PRN
Status: DISCONTINUED | OUTPATIENT
Start: 2021-07-02 | End: 2021-07-02 | Stop reason: SDUPTHER

## 2021-07-02 RX ORDER — SODIUM CHLORIDE 9 MG/ML
INJECTION, SOLUTION INTRAVENOUS CONTINUOUS PRN
Status: DISCONTINUED | OUTPATIENT
Start: 2021-07-02 | End: 2021-07-02 | Stop reason: SDUPTHER

## 2021-07-02 RX ORDER — MANNITOL 250 MG/ML
25 INJECTION, SOLUTION INTRAVENOUS EVERY 8 HOURS
Status: DISCONTINUED | OUTPATIENT
Start: 2021-07-02 | End: 2021-07-02 | Stop reason: ALTCHOICE

## 2021-07-02 RX ORDER — ONDANSETRON 4 MG/1
4 TABLET, ORALLY DISINTEGRATING ORAL EVERY 8 HOURS PRN
Status: DISCONTINUED | OUTPATIENT
Start: 2021-07-02 | End: 2021-07-08 | Stop reason: HOSPADM

## 2021-07-02 RX ORDER — LABETALOL 20 MG/4 ML (5 MG/ML) INTRAVENOUS SYRINGE
Status: COMPLETED
Start: 2021-07-02 | End: 2021-07-02

## 2021-07-02 RX ORDER — ALBUMIN (HUMAN) 12.5 G/50ML
25 SOLUTION INTRAVENOUS ONCE
Status: COMPLETED | OUTPATIENT
Start: 2021-07-03 | End: 2021-07-03

## 2021-07-02 RX ORDER — SODIUM CHLORIDE 0.9 % (FLUSH) 0.9 %
5-40 SYRINGE (ML) INJECTION EVERY 12 HOURS SCHEDULED
Status: DISCONTINUED | OUTPATIENT
Start: 2021-07-02 | End: 2021-07-08 | Stop reason: HOSPADM

## 2021-07-02 RX ORDER — ALBUMIN, HUMAN INJ 5% 5 %
SOLUTION INTRAVENOUS PRN
Status: DISCONTINUED | OUTPATIENT
Start: 2021-07-02 | End: 2021-07-02 | Stop reason: SDUPTHER

## 2021-07-02 RX ORDER — GINSENG 100 MG
CAPSULE ORAL PRN
Status: DISCONTINUED | OUTPATIENT
Start: 2021-07-02 | End: 2021-07-02 | Stop reason: HOSPADM

## 2021-07-02 RX ORDER — MORPHINE SULFATE 4 MG/ML
4 INJECTION, SOLUTION INTRAMUSCULAR; INTRAVENOUS
Status: DISCONTINUED | OUTPATIENT
Start: 2021-07-02 | End: 2021-07-03

## 2021-07-02 RX ORDER — GLYCOPYRROLATE 1 MG/5 ML
SYRINGE (ML) INTRAVENOUS PRN
Status: DISCONTINUED | OUTPATIENT
Start: 2021-07-02 | End: 2021-07-02 | Stop reason: SDUPTHER

## 2021-07-02 RX ORDER — DEXAMETHASONE SODIUM PHOSPHATE 10 MG/ML
INJECTION, EMULSION INTRAMUSCULAR; INTRAVENOUS PRN
Status: DISCONTINUED | OUTPATIENT
Start: 2021-07-02 | End: 2021-07-02 | Stop reason: SDUPTHER

## 2021-07-02 RX ORDER — MANNITOL 20 G/100ML
25 INJECTION, SOLUTION INTRAVENOUS EVERY 8 HOURS
Status: COMPLETED | OUTPATIENT
Start: 2021-07-03 | End: 2021-07-03

## 2021-07-02 RX ORDER — ALBUMIN (HUMAN) 12.5 G/50ML
SOLUTION INTRAVENOUS
Status: COMPLETED
Start: 2021-07-02 | End: 2021-07-03

## 2021-07-02 RX ORDER — FENTANYL CITRATE 50 UG/ML
INJECTION, SOLUTION INTRAMUSCULAR; INTRAVENOUS
Status: COMPLETED
Start: 2021-07-02 | End: 2021-07-02

## 2021-07-02 RX ORDER — FENTANYL CITRATE 50 UG/ML
25 INJECTION, SOLUTION INTRAMUSCULAR; INTRAVENOUS EVERY 5 MIN PRN
Status: DISCONTINUED | OUTPATIENT
Start: 2021-07-02 | End: 2021-07-02 | Stop reason: HOSPADM

## 2021-07-02 RX ORDER — PROPOFOL 10 MG/ML
INJECTION, EMULSION INTRAVENOUS PRN
Status: DISCONTINUED | OUTPATIENT
Start: 2021-07-02 | End: 2021-07-02 | Stop reason: SDUPTHER

## 2021-07-02 RX ORDER — LABETALOL 20 MG/4 ML (5 MG/ML) INTRAVENOUS SYRINGE
5 EVERY 10 MIN PRN
Status: DISCONTINUED | OUTPATIENT
Start: 2021-07-02 | End: 2021-07-02 | Stop reason: HOSPADM

## 2021-07-02 RX ORDER — ONDANSETRON 2 MG/ML
4 INJECTION INTRAMUSCULAR; INTRAVENOUS EVERY 6 HOURS PRN
Status: DISCONTINUED | OUTPATIENT
Start: 2021-07-02 | End: 2021-07-08 | Stop reason: HOSPADM

## 2021-07-02 RX ORDER — LIDOCAINE HCL/PF 100 MG/5ML
SYRINGE (ML) INJECTION PRN
Status: DISCONTINUED | OUTPATIENT
Start: 2021-07-02 | End: 2021-07-02 | Stop reason: SDUPTHER

## 2021-07-02 RX ORDER — HYDROCODONE BITARTRATE AND ACETAMINOPHEN 5; 325 MG/1; MG/1
1 TABLET ORAL EVERY 6 HOURS PRN
Status: DISCONTINUED | OUTPATIENT
Start: 2021-07-02 | End: 2021-07-08 | Stop reason: HOSPADM

## 2021-07-02 RX ORDER — SODIUM CHLORIDE 0.9 % (FLUSH) 0.9 %
5-40 SYRINGE (ML) INJECTION PRN
Status: DISCONTINUED | OUTPATIENT
Start: 2021-07-02 | End: 2021-07-08 | Stop reason: HOSPADM

## 2021-07-02 RX ORDER — FUROSEMIDE 10 MG/ML
INJECTION INTRAMUSCULAR; INTRAVENOUS PRN
Status: DISCONTINUED | OUTPATIENT
Start: 2021-07-02 | End: 2021-07-02 | Stop reason: SDUPTHER

## 2021-07-02 RX ORDER — ONDANSETRON 2 MG/ML
INJECTION INTRAMUSCULAR; INTRAVENOUS PRN
Status: DISCONTINUED | OUTPATIENT
Start: 2021-07-02 | End: 2021-07-02 | Stop reason: SDUPTHER

## 2021-07-02 RX ORDER — MANNITOL 20 G/100ML
50 INJECTION, SOLUTION INTRAVENOUS ONCE
Status: DISCONTINUED | OUTPATIENT
Start: 2021-07-02 | End: 2021-07-02 | Stop reason: HOSPADM

## 2021-07-02 RX ORDER — SODIUM CHLORIDE 9 MG/ML
INJECTION, SOLUTION INTRAVENOUS CONTINUOUS
Status: DISCONTINUED | OUTPATIENT
Start: 2021-07-02 | End: 2021-07-04

## 2021-07-02 RX ORDER — GLYCOPYRROLATE 1 MG/5 ML
SYRINGE (ML) INTRAVENOUS PRN
Status: DISCONTINUED | OUTPATIENT
Start: 2021-07-02 | End: 2021-07-02

## 2021-07-02 RX ADMIN — Medication 0.2 MG: at 14:28

## 2021-07-02 RX ADMIN — ONDANSETRON HYDROCHLORIDE 4 MG: 4 INJECTION, SOLUTION INTRAMUSCULAR; INTRAVENOUS at 17:01

## 2021-07-02 RX ADMIN — Medication 15 MG: at 11:35

## 2021-07-02 RX ADMIN — LABETALOL 20 MG/4 ML (5 MG/ML) INTRAVENOUS SYRINGE 5 MG: at 18:30

## 2021-07-02 RX ADMIN — SODIUM CHLORIDE: 9 INJECTION, SOLUTION INTRAVENOUS at 10:50

## 2021-07-02 RX ADMIN — PHENYLEPHRINE HYDROCHLORIDE 50 MCG: 10 INJECTION INTRAVENOUS at 13:48

## 2021-07-02 RX ADMIN — Medication 60 MG: at 10:58

## 2021-07-02 RX ADMIN — MANNITOL 50 G: 20 INJECTION, SOLUTION INTRAVENOUS at 13:00

## 2021-07-02 RX ADMIN — FENTANYL CITRATE 25 MCG: 50 INJECTION, SOLUTION INTRAMUSCULAR; INTRAVENOUS at 18:33

## 2021-07-02 RX ADMIN — PHENYLEPHRINE HYDROCHLORIDE 50 MCG: 10 INJECTION INTRAVENOUS at 15:59

## 2021-07-02 RX ADMIN — MORPHINE SULFATE 2 MG: 2 INJECTION, SOLUTION INTRAMUSCULAR; INTRAVENOUS at 19:43

## 2021-07-02 RX ADMIN — ROCURONIUM BROMIDE 20 MG: 10 INJECTION INTRAVENOUS at 13:30

## 2021-07-02 RX ADMIN — PHENYLEPHRINE HYDROCHLORIDE 50 MCG: 10 INJECTION INTRAVENOUS at 14:04

## 2021-07-02 RX ADMIN — MORPHINE SULFATE 4 MG: 4 INJECTION, SOLUTION INTRAMUSCULAR; INTRAVENOUS at 23:12

## 2021-07-02 RX ADMIN — PHENYLEPHRINE HYDROCHLORIDE 25 MCG: 10 INJECTION INTRAVENOUS at 15:49

## 2021-07-02 RX ADMIN — ESMOLOL HYDROCHLORIDE 10 MG: 10 INJECTION, SOLUTION INTRAVENOUS at 17:48

## 2021-07-02 RX ADMIN — LEVETIRACETAM 500 MG: 100 INJECTION INTRAVENOUS at 20:46

## 2021-07-02 RX ADMIN — ROCURONIUM BROMIDE 50 MG: 10 INJECTION INTRAVENOUS at 10:58

## 2021-07-02 RX ADMIN — FENTANYL CITRATE 25 MCG: 50 INJECTION, SOLUTION INTRAMUSCULAR; INTRAVENOUS at 16:51

## 2021-07-02 RX ADMIN — ONDANSETRON 4 MG: 4 TABLET, ORALLY DISINTEGRATING ORAL at 05:28

## 2021-07-02 RX ADMIN — PROPOFOL 40 MG: 10 INJECTION, EMULSION INTRAVENOUS at 16:49

## 2021-07-02 RX ADMIN — Medication 5 MG: at 11:52

## 2021-07-02 RX ADMIN — LABETALOL 20 MG/4 ML (5 MG/ML) INTRAVENOUS SYRINGE 5 MG: at 18:20

## 2021-07-02 RX ADMIN — SERTRALINE 50 MG: 50 TABLET, FILM COATED ORAL at 22:24

## 2021-07-02 RX ADMIN — ROCURONIUM BROMIDE 30 MG: 10 INJECTION INTRAVENOUS at 12:22

## 2021-07-02 RX ADMIN — ESMOLOL HYDROCHLORIDE 10 MG: 10 INJECTION, SOLUTION INTRAVENOUS at 16:50

## 2021-07-02 RX ADMIN — SODIUM CHLORIDE: 9 INJECTION, SOLUTION INTRAVENOUS at 11:25

## 2021-07-02 RX ADMIN — DEXAMETHASONE SODIUM PHOSPHATE 4 MG: 4 INJECTION, SOLUTION INTRA-ARTICULAR; INTRALESIONAL; INTRAMUSCULAR; INTRAVENOUS; SOFT TISSUE at 21:38

## 2021-07-02 RX ADMIN — HYDRALAZINE HYDROCHLORIDE 10 MG: 20 INJECTION, SOLUTION INTRAMUSCULAR; INTRAVENOUS at 20:19

## 2021-07-02 RX ADMIN — PHENYLEPHRINE HYDROCHLORIDE 100 MCG: 10 INJECTION INTRAVENOUS at 12:15

## 2021-07-02 RX ADMIN — AMINOLEVULINIC ACID HYDROCHLORIDE 1185 MG: 1500 POWDER, FOR SOLUTION ORAL at 07:08

## 2021-07-02 RX ADMIN — PHENYLEPHRINE HYDROCHLORIDE 50 MCG: 10 INJECTION INTRAVENOUS at 14:16

## 2021-07-02 RX ADMIN — LEVOTHYROXINE SODIUM 100 MCG: 0.1 TABLET ORAL at 05:28

## 2021-07-02 RX ADMIN — ROCURONIUM BROMIDE 20 MG: 10 INJECTION INTRAVENOUS at 15:44

## 2021-07-02 RX ADMIN — PHENYLEPHRINE HYDROCHLORIDE 100 MCG: 10 INJECTION INTRAVENOUS at 11:53

## 2021-07-02 RX ADMIN — PHENYLEPHRINE HYDROCHLORIDE 50 MCG: 10 INJECTION INTRAVENOUS at 13:38

## 2021-07-02 RX ADMIN — SUGAMMADEX 200 MG: 100 INJECTION, SOLUTION INTRAVENOUS at 17:47

## 2021-07-02 RX ADMIN — PROPOFOL 20 MG: 10 INJECTION, EMULSION INTRAVENOUS at 17:24

## 2021-07-02 RX ADMIN — SODIUM CHLORIDE: 9 INJECTION, SOLUTION INTRAVENOUS at 13:47

## 2021-07-02 RX ADMIN — PHENYLEPHRINE HYDROCHLORIDE 50 MCG: 10 INJECTION INTRAVENOUS at 13:30

## 2021-07-02 RX ADMIN — PROPOFOL 80 MG: 10 INJECTION, EMULSION INTRAVENOUS at 10:58

## 2021-07-02 RX ADMIN — ALBUMIN (HUMAN) 25 G: 12.5 SOLUTION INTRAVENOUS at 23:47

## 2021-07-02 RX ADMIN — MORPHINE SULFATE 2 MG: 2 INJECTION, SOLUTION INTRAMUSCULAR; INTRAVENOUS at 20:59

## 2021-07-02 RX ADMIN — PHENYLEPHRINE HYDROCHLORIDE 50 MCG: 10 INJECTION INTRAVENOUS at 12:55

## 2021-07-02 RX ADMIN — PHENYLEPHRINE HYDROCHLORIDE 50 MCG: 10 INJECTION INTRAVENOUS at 16:18

## 2021-07-02 RX ADMIN — PROPOFOL 50 MG: 10 INJECTION, EMULSION INTRAVENOUS at 14:24

## 2021-07-02 RX ADMIN — ATORVASTATIN CALCIUM 10 MG: 10 TABLET, FILM COATED ORAL at 22:24

## 2021-07-02 RX ADMIN — CALCIUM CHLORIDE 1 G: 100 INJECTION, SOLUTION INTRAVENOUS at 14:20

## 2021-07-02 RX ADMIN — CEFAZOLIN 2000 MG: 10 INJECTION, POWDER, FOR SOLUTION INTRAVENOUS at 23:49

## 2021-07-02 RX ADMIN — ALBUMIN (HUMAN) 250 ML: 12.5 SOLUTION INTRAVENOUS at 14:18

## 2021-07-02 RX ADMIN — CEFAZOLIN 2000 MG: 1 INJECTION, POWDER, FOR SOLUTION INTRAMUSCULAR; INTRAVENOUS at 12:11

## 2021-07-02 RX ADMIN — SODIUM CHLORIDE: 9 INJECTION, SOLUTION INTRAVENOUS at 17:05

## 2021-07-02 RX ADMIN — DOCUSATE SODIUM 100 MG: 100 CAPSULE, LIQUID FILLED ORAL at 22:22

## 2021-07-02 RX ADMIN — Medication 10 MG: at 17:29

## 2021-07-02 RX ADMIN — PHENYLEPHRINE HYDROCHLORIDE 25 MCG: 10 INJECTION INTRAVENOUS at 14:43

## 2021-07-02 RX ADMIN — PHENYLEPHRINE HYDROCHLORIDE 100 MCG: 10 INJECTION INTRAVENOUS at 11:19

## 2021-07-02 RX ADMIN — HYDROMORPHONE HYDROCHLORIDE 0.1 MG: 2 INJECTION INTRAMUSCULAR; INTRAVENOUS; SUBCUTANEOUS at 15:46

## 2021-07-02 RX ADMIN — LEVETIRACETAM 500 MG: 100 INJECTION, SOLUTION INTRAVENOUS at 12:27

## 2021-07-02 RX ADMIN — PHENYLEPHRINE HYDROCHLORIDE 100 MCG: 10 INJECTION INTRAVENOUS at 12:40

## 2021-07-02 RX ADMIN — PANTOPRAZOLE SODIUM 40 MG: 40 TABLET, DELAYED RELEASE ORAL at 05:28

## 2021-07-02 RX ADMIN — HYDROMORPHONE HYDROCHLORIDE 0.2 MG: 2 INJECTION INTRAMUSCULAR; INTRAVENOUS; SUBCUTANEOUS at 12:51

## 2021-07-02 RX ADMIN — SODIUM CHLORIDE, PRESERVATIVE FREE 10 ML: 5 INJECTION INTRAVENOUS at 22:09

## 2021-07-02 RX ADMIN — ALBUMIN (HUMAN) 25 G: 0.25 INJECTION, SOLUTION INTRAVENOUS at 23:47

## 2021-07-02 RX ADMIN — PHENYLEPHRINE HYDROCHLORIDE 50 MCG: 10 INJECTION INTRAVENOUS at 16:35

## 2021-07-02 RX ADMIN — FENTANYL CITRATE 100 MCG: 50 INJECTION, SOLUTION INTRAMUSCULAR; INTRAVENOUS at 10:58

## 2021-07-02 RX ADMIN — CEFAZOLIN 2000 MG: 1 INJECTION, POWDER, FOR SOLUTION INTRAMUSCULAR; INTRAVENOUS at 16:11

## 2021-07-02 RX ADMIN — FUROSEMIDE 10 MG: 10 INJECTION, SOLUTION INTRAMUSCULAR; INTRAVENOUS at 13:11

## 2021-07-02 RX ADMIN — ONDANSETRON HYDROCHLORIDE 4 MG: 2 SOLUTION INTRAMUSCULAR; INTRAVENOUS at 22:57

## 2021-07-02 RX ADMIN — Medication 10 MG: at 16:56

## 2021-07-02 RX ADMIN — DEXAMETHASONE SODIUM PHOSPHATE 10 MG: 10 INJECTION, EMULSION INTRAMUSCULAR; INTRAVENOUS at 13:33

## 2021-07-02 ASSESSMENT — PULMONARY FUNCTION TESTS
PIF_VALUE: 20
PIF_VALUE: 3
PIF_VALUE: 18
PIF_VALUE: 15
PIF_VALUE: 19
PIF_VALUE: 17
PIF_VALUE: 19
PIF_VALUE: 0
PIF_VALUE: 18
PIF_VALUE: 2
PIF_VALUE: 18
PIF_VALUE: 18
PIF_VALUE: 16
PIF_VALUE: 19
PIF_VALUE: 18
PIF_VALUE: 15
PIF_VALUE: 18
PIF_VALUE: 18
PIF_VALUE: 16
PIF_VALUE: 20
PIF_VALUE: 18
PIF_VALUE: 15
PIF_VALUE: 0
PIF_VALUE: 19
PIF_VALUE: 19
PIF_VALUE: 18
PIF_VALUE: 1
PIF_VALUE: 19
PIF_VALUE: 19
PIF_VALUE: 18
PIF_VALUE: 15
PIF_VALUE: 18
PIF_VALUE: 15
PIF_VALUE: 18
PIF_VALUE: 19
PIF_VALUE: 18
PIF_VALUE: 16
PIF_VALUE: 19
PIF_VALUE: 18
PIF_VALUE: 18
PIF_VALUE: 20
PIF_VALUE: 18
PIF_VALUE: 19
PIF_VALUE: 0
PIF_VALUE: 18
PIF_VALUE: 18
PIF_VALUE: 15
PIF_VALUE: 20
PIF_VALUE: 19
PIF_VALUE: 18
PIF_VALUE: 16
PIF_VALUE: 18
PIF_VALUE: 16
PIF_VALUE: 18
PIF_VALUE: 19
PIF_VALUE: 19
PIF_VALUE: 15
PIF_VALUE: 2
PIF_VALUE: 18
PIF_VALUE: 19
PIF_VALUE: 18
PIF_VALUE: 19
PIF_VALUE: 19
PIF_VALUE: 18
PIF_VALUE: 19
PIF_VALUE: 18
PIF_VALUE: 18
PIF_VALUE: 19
PIF_VALUE: 18
PIF_VALUE: 17
PIF_VALUE: 19
PIF_VALUE: 20
PIF_VALUE: 19
PIF_VALUE: 19
PIF_VALUE: 18
PIF_VALUE: 2
PIF_VALUE: 19
PIF_VALUE: 18
PIF_VALUE: 18
PIF_VALUE: 16
PIF_VALUE: 19
PIF_VALUE: 16
PIF_VALUE: 5
PIF_VALUE: 19
PIF_VALUE: 17
PIF_VALUE: 18
PIF_VALUE: 19
PIF_VALUE: 15
PIF_VALUE: 15
PIF_VALUE: 18
PIF_VALUE: 19
PIF_VALUE: 17
PIF_VALUE: 18
PIF_VALUE: 20
PIF_VALUE: 18
PIF_VALUE: 1
PIF_VALUE: 17
PIF_VALUE: 1
PIF_VALUE: 19
PIF_VALUE: 20
PIF_VALUE: 18
PIF_VALUE: 19
PIF_VALUE: 18
PIF_VALUE: 19
PIF_VALUE: 15
PIF_VALUE: 18
PIF_VALUE: 1
PIF_VALUE: 18
PIF_VALUE: 1
PIF_VALUE: 20
PIF_VALUE: 19
PIF_VALUE: 18
PIF_VALUE: 0
PIF_VALUE: 18
PIF_VALUE: 20
PIF_VALUE: 18
PIF_VALUE: 19
PIF_VALUE: 25
PIF_VALUE: 18
PIF_VALUE: 18
PIF_VALUE: 19
PIF_VALUE: 18
PIF_VALUE: 19
PIF_VALUE: 19
PIF_VALUE: 15
PIF_VALUE: 18
PIF_VALUE: 16
PIF_VALUE: 18
PIF_VALUE: 18
PIF_VALUE: 20
PIF_VALUE: 19
PIF_VALUE: 18
PIF_VALUE: 19
PIF_VALUE: 18
PIF_VALUE: 18
PIF_VALUE: 19
PIF_VALUE: 18
PIF_VALUE: 6
PIF_VALUE: 19
PIF_VALUE: 18
PIF_VALUE: 15
PIF_VALUE: 18
PIF_VALUE: 18
PIF_VALUE: 15
PIF_VALUE: 18
PIF_VALUE: 18
PIF_VALUE: 20
PIF_VALUE: 18
PIF_VALUE: 16
PIF_VALUE: 18
PIF_VALUE: 16
PIF_VALUE: 16
PIF_VALUE: 18
PIF_VALUE: 19
PIF_VALUE: 18
PIF_VALUE: 19
PIF_VALUE: 18
PIF_VALUE: 19
PIF_VALUE: 18
PIF_VALUE: 19
PIF_VALUE: 5
PIF_VALUE: 18
PIF_VALUE: 19
PIF_VALUE: 18
PIF_VALUE: 20
PIF_VALUE: 18
PIF_VALUE: 20
PIF_VALUE: 19
PIF_VALUE: 18
PIF_VALUE: 15
PIF_VALUE: 19
PIF_VALUE: 13
PIF_VALUE: 18
PIF_VALUE: 18
PIF_VALUE: 19
PIF_VALUE: 18
PIF_VALUE: 19
PIF_VALUE: 18
PIF_VALUE: 20
PIF_VALUE: 19
PIF_VALUE: 18
PIF_VALUE: 17
PIF_VALUE: 19
PIF_VALUE: 15
PIF_VALUE: 19
PIF_VALUE: 18
PIF_VALUE: 19
PIF_VALUE: 18
PIF_VALUE: 19
PIF_VALUE: 18
PIF_VALUE: 18
PIF_VALUE: 16
PIF_VALUE: 18
PIF_VALUE: 18
PIF_VALUE: 17
PIF_VALUE: 19
PIF_VALUE: 18
PIF_VALUE: 19
PIF_VALUE: 19
PIF_VALUE: 18
PIF_VALUE: 19
PIF_VALUE: 18
PIF_VALUE: 15
PIF_VALUE: 18
PIF_VALUE: 19
PIF_VALUE: 18
PIF_VALUE: 15
PIF_VALUE: 20
PIF_VALUE: 18
PIF_VALUE: 18
PIF_VALUE: 16
PIF_VALUE: 18
PIF_VALUE: 19
PIF_VALUE: 18
PIF_VALUE: 18
PIF_VALUE: 15
PIF_VALUE: 18
PIF_VALUE: 19
PIF_VALUE: 19
PIF_VALUE: 18
PIF_VALUE: 18
PIF_VALUE: 19
PIF_VALUE: 18
PIF_VALUE: 18
PIF_VALUE: 1
PIF_VALUE: 18
PIF_VALUE: 14
PIF_VALUE: 18
PIF_VALUE: 19
PIF_VALUE: 16
PIF_VALUE: 18
PIF_VALUE: 19
PIF_VALUE: 18
PIF_VALUE: 17
PIF_VALUE: 19
PIF_VALUE: 18
PIF_VALUE: 16
PIF_VALUE: 18
PIF_VALUE: 19
PIF_VALUE: 1
PIF_VALUE: 15
PIF_VALUE: 22
PIF_VALUE: 18
PIF_VALUE: 1
PIF_VALUE: 15
PIF_VALUE: 19
PIF_VALUE: 18
PIF_VALUE: 15
PIF_VALUE: 19
PIF_VALUE: 19
PIF_VALUE: 17
PIF_VALUE: 18
PIF_VALUE: 19
PIF_VALUE: 18
PIF_VALUE: 18
PIF_VALUE: 19
PIF_VALUE: 1
PIF_VALUE: 18
PIF_VALUE: 19
PIF_VALUE: 18
PIF_VALUE: 19
PIF_VALUE: 18
PIF_VALUE: 19
PIF_VALUE: 18
PIF_VALUE: 19
PIF_VALUE: 18
PIF_VALUE: 18
PIF_VALUE: 17
PIF_VALUE: 19
PIF_VALUE: 19
PIF_VALUE: 18
PIF_VALUE: 19
PIF_VALUE: 18
PIF_VALUE: 16
PIF_VALUE: 18
PIF_VALUE: 19
PIF_VALUE: 15
PIF_VALUE: 1
PIF_VALUE: 20
PIF_VALUE: 18
PIF_VALUE: 2
PIF_VALUE: 18
PIF_VALUE: 19
PIF_VALUE: 18
PIF_VALUE: 16
PIF_VALUE: 15
PIF_VALUE: 18
PIF_VALUE: 19
PIF_VALUE: 18
PIF_VALUE: 16
PIF_VALUE: 18
PIF_VALUE: 18
PIF_VALUE: 20
PIF_VALUE: 18
PIF_VALUE: 19
PIF_VALUE: 18
PIF_VALUE: 19
PIF_VALUE: 18
PIF_VALUE: 19
PIF_VALUE: 18
PIF_VALUE: 19
PIF_VALUE: 18
PIF_VALUE: 19
PIF_VALUE: 19
PIF_VALUE: 18
PIF_VALUE: 18
PIF_VALUE: 16
PIF_VALUE: 18
PIF_VALUE: 19
PIF_VALUE: 1
PIF_VALUE: 18
PIF_VALUE: 19
PIF_VALUE: 3
PIF_VALUE: 18
PIF_VALUE: 18
PIF_VALUE: 19
PIF_VALUE: 18
PIF_VALUE: 19
PIF_VALUE: 3
PIF_VALUE: 17
PIF_VALUE: 18
PIF_VALUE: 18
PIF_VALUE: 19
PIF_VALUE: 5
PIF_VALUE: 15
PIF_VALUE: 18
PIF_VALUE: 19
PIF_VALUE: 18
PIF_VALUE: 18
PIF_VALUE: 19
PIF_VALUE: 18
PIF_VALUE: 17
PIF_VALUE: 19
PIF_VALUE: 20
PIF_VALUE: 18
PIF_VALUE: 19
PIF_VALUE: 26
PIF_VALUE: 20
PIF_VALUE: 19
PIF_VALUE: 19
PIF_VALUE: 1
PIF_VALUE: 18
PIF_VALUE: 1
PIF_VALUE: 19
PIF_VALUE: 18
PIF_VALUE: 19
PIF_VALUE: 18
PIF_VALUE: 19

## 2021-07-02 ASSESSMENT — ENCOUNTER SYMPTOMS
ABDOMINAL DISTENTION: 0
PHOTOPHOBIA: 0
CONSTIPATION: 0
BLOOD IN STOOL: 0
COUGH: 0
WHEEZING: 0
CHOKING: 0
ABDOMINAL PAIN: 0
NAUSEA: 1
BACK PAIN: 0
COLOR CHANGE: 0
TROUBLE SWALLOWING: 0
DIARRHEA: 0
SHORTNESS OF BREATH: 0
VOMITING: 0
RHINORRHEA: 0
SORE THROAT: 0
CHEST TIGHTNESS: 0

## 2021-07-02 ASSESSMENT — PAIN SCALES - GENERAL
PAINLEVEL_OUTOF10: 10
PAINLEVEL_OUTOF10: 0
PAINLEVEL_OUTOF10: 10
PAINLEVEL_OUTOF10: 0
PAINLEVEL_OUTOF10: 0
PAINLEVEL_OUTOF10: 10
PAINLEVEL_OUTOF10: 4

## 2021-07-02 ASSESSMENT — PAIN DESCRIPTION - ONSET
ONSET: ON-GOING
ONSET: ON-GOING

## 2021-07-02 ASSESSMENT — PAIN DESCRIPTION - ORIENTATION
ORIENTATION: RIGHT
ORIENTATION: RIGHT

## 2021-07-02 ASSESSMENT — PAIN DESCRIPTION - PAIN TYPE
TYPE: ACUTE PAIN
TYPE: ACUTE PAIN

## 2021-07-02 ASSESSMENT — PAIN DESCRIPTION - DESCRIPTORS
DESCRIPTORS: ACHING
DESCRIPTORS: ACHING

## 2021-07-02 ASSESSMENT — PAIN DESCRIPTION - FREQUENCY
FREQUENCY: CONTINUOUS
FREQUENCY: CONTINUOUS

## 2021-07-02 ASSESSMENT — PAIN DESCRIPTION - LOCATION
LOCATION: HEAD
LOCATION: HEAD

## 2021-07-02 ASSESSMENT — PAIN DESCRIPTION - PROGRESSION
CLINICAL_PROGRESSION: GRADUALLY WORSENING
CLINICAL_PROGRESSION: GRADUALLY WORSENING

## 2021-07-02 NOTE — PROGRESS NOTES
Patient was seen and examined in the ICU     There are no changes in patient symptoms and neurological exam since yesterday. Sam Doherty is on medication  Today I discussed with patient and her family (in front her nurse) again today planned surgical intervention as well as the associated risk and benefit and alternative. All questions and concerns were addressed and answered. Patient her family elected again to go with today planned surgical intervention.

## 2021-07-02 NOTE — PROGRESS NOTES
CRITICAL CARE PROGRESS NOTE      Patient:  Roger Duran    Unit/Bed:3A-11/011-A  YOB: 1933  MRN: [id number]   PCP: Lavell Turner MD  Date of Admission: 6/28/2021  Chief Complaint:-   Chief Complaint   Patient presents with    Trauma    Fall        Assessment and Plan:    Fall: History of frequent falls over the past couple months, had not been evaluated for this till arrival.  Found to have intracranial hemorrhage surrounding brain mass. Subarchnoid hemorrhage: Trauma versus hemorrhagic mass. Status post TXA. SBP between 100-160. TEG unremarkable  Brain mass:  Right ocipital and pariatal brain mass with hemorrhage and necrosis. 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 Seizure prophylaxis, Decadron. Plan for OR today at 1000. Is n.p.o., off anticoagulation, 2 units held for OR, taken unremarkable. Hx Temporal arteritis: On Decadron. Has prosthetic right eye. HTN: Hydralazine ordered  HLD: On Lipitor  Hypothyroidism : Synthroid ordered  Hx of breast cancer: CT abdomen and pelvis not significant for suspicious lesions   Fluids: None, tolerating p.o. 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 Patient has had frequent falls over the past month, had a fall when trying to go to the bathroom, striking her right forehead on the nightstand. Patient denies loss of consciousness. Unable to get up due to generalized weakness. Home health provider arrived, called EMS. Patient was level 2 trauma due to Plavix and head trauma. CT found to have spiculated right-sided brain mass, subarachnoid hemorrhage.   Admitted to trauma due to element of fall and presence of head bleed. Plan to go to the OR 7/2 for debulking. 7/1 - no complaints. Patient aware of plan to go to the OR tomorrow. Spoke with Dr. Debra Chauhan, who requested tagged to be done today, 2 units on hold for the OR tomorrow, n.p.o. at midnight. 7/2 -patient had mild nausea overnight, improved after antiemetic. Patient asymptomatic at this time. Patient n.p.o. since midnight. Off anticoagulation. Given Gleodon plan to go to the OR at 1000.      Past Medical History:    Past Medical History:   Diagnosis Date    Anxiety     Blind right eye     Breast cancer metastasized to liver ([facility] 05/10/2016    Liver lesion noted     Carotid stenosis      Left ICA 25%    Colostomy in place ([facility] 05/27/2016    Degenerative arthritis of cervical spine 5/07    GERD (gastroesophageal reflux disease) 11/06    Hypercholesteremia     Hypoestrogenism     Hypothyroidism     Lumbago     Lumbosacral spinal stenosis 05/2019    MDRO (multiple drug resistant organisms) resistance 2008    pt stated cleared    Personal history of cardiac dysrhythmia     Sigmoid diverticulosis 8/04    Spondylolisthesis of lumbosacral region 8/04    Steroid-induced hyperglycemia     Subclavian artery stenosis (HCC)     left    Superior and Inferior Pubic ramus fracture, right, closed, initial encounter ([facility] [address] 05/21/2019    SVT (supraventricular tachycardia) ([facility] [address] 6/07    Temporal arteritis (HCC)     Vertebral artery occlusion     left    Vitamin D deficiency 06/2017      Family History:    Family History   Problem Relation Age of Onset    Cancer Sister 61        breast    Cancer Brother 79        lung    Cancer Brother 68        colon    Cancer Son 48        lung      Social History:    Social History       Tobacco History       Smoking Status  Former Smoker Smoking Frequency  0.5 packs/day Smoking Tobacco Type  Cigarettes      Smokeless Tobacco Use  Never Used              Alcohol History       Alcohol Use Status  No              Drug Use       Drug Use Status  No              Sexual Activity       Sexually Active  Never                 . ROS Review of Systems   Constitutional: Negative for chills, diaphoresis, fatigue and fever. HENT: Negative for rhinorrhea, sore throat and trouble swallowing. Eyes: Negative for photophobia and visual disturbance. Respiratory: Negative for cough, choking, chest tightness, shortness of breath and wheezing. Cardiovascular: Negative for chest pain, palpitations and leg swelling. Gastrointestinal: Positive for nausea (improving). Negative for abdominal distention, abdominal pain, blood in stool, constipation, diarrhea and vomiting. Genitourinary: Negative for decreased urine volume, difficulty urinating, dysuria, flank pain, frequency and urgency. Musculoskeletal: Negative for arthralgias, back pain, myalgias, neck pain and neck stiffness. Skin: Negative for color change and rash. Neurological: Negative for syncope, weakness, light-headedness and numbness.         Scheduled Meds:   aminolevulinic acid  20 mg/kg Oral Once    calcium-vitamin D  1 tablet Oral BID    isosorbide mononitrate  30 mg Oral Daily    levothyroxine  100 mcg Oral Daily    cetirizine  10 mg Oral Daily    multivitamin  1 tablet Oral Daily    pantoprazole  40 mg Oral QAM AC    sertraline  50 mg Oral BID    atorvastatin  10 mg Oral Nightly    sodium chloride flush  5-40 mL Intravenous 2 times per day    polyethylene glycol  17 g Oral Daily    lidocaine  2 patch Topical Daily    levetiracetam  500 mg Intravenous Q12H    insulin lispro  0-6 Units Subcutaneous TID WC    insulin lispro  0-3 Units Subcutaneous Nightly    docusate sodium  100 mg Oral BID    dexamethasone  4 mg Intravenous Daily     Continuous Infusions:   sodium chloride      sodium chloride      sodium chloride      dextrose         PHYSICAL EXAMINATION:  Vitals:    07/02/21 0500 07/02/21 0530 07/02/21 0600 07/02/21 0630   BP: (!) 157/61      Pulse: 58 57 62 57   Resp: 23   18   Temp:       TempSrc:       SpO2:       Weight:       Height:            In: 460.2   Out: -      Body mass index is 25.53 kg/m². GCS:   No data recorded         Physical Exam  HENT:      Head: Normocephalic. Right Ear: External ear normal. There is impacted cerumen. Left Ear: External ear normal. There is impacted cerumen. Nose: Nose normal. No rhinorrhea. Mouth/Throat:      Mouth: Mucous membranes are moist.      Pharynx: Oropharynx is clear. Eyes:      Conjunctiva/sclera: Conjunctivae normal.      Comments: Disconjugate gaze, right prosthetic eye. Left eye has very minimal reaction to light   Neck:      Vascular: No carotid bruit. Cardiovascular:      Rate and Rhythm: Normal rate and regular rhythm. Pulses: Normal pulses. Heart sounds: Normal heart sounds. No murmur heard. No gallop. Pulmonary:      Effort: Pulmonary effort is normal. No respiratory distress. Breath sounds: Normal breath sounds. No wheezing or rales. Chest:      Chest wall: No tenderness. Abdominal:      General: Abdomen is flat. Bowel sounds are normal. There is no distension. Palpations: Abdomen is soft. Tenderness: There is no abdominal tenderness. Musculoskeletal:         General: No tenderness, deformity or signs of injury. Cervical back: Normal range of motion and neck supple. Right lower leg: No edema. Left lower leg: No edema. Skin:     General: Skin is warm and dry. Capillary Refill: Capillary refill takes less than 2 seconds. Findings: Bruising (right eyebrow, periorbital) present. Neurological:      Mental Status: She is alert. Data Review: (All radiographs, tracings, PFTs, and imaging are personally viewed and interpreted unless otherwise noted). XR CHEST 1 VIEW   Final Result   1. No interval change since previous study dated for May 2021, no acute cardiopulmonary disease. **This report has been created using voice recognition software. It may contain minor errors which are inherent in voice recognition technology. **      Final report electronically signed by DR Nicole Smart on 7/1/2021 10:24 AM      MRI BRAIN W CONTRAST   Final Result    Redemonstration of a 5.5 cm irregularly enhancing intra-axial mass at the right temporal occipital junction as evidence for high-grade neoplasm with stable 1 cm leftward midline shift for surgical planning. **This report has been created using voice recognition software. It may contain minor errors which are inherent in voice recognition technology. **      Final report electronically signed by Dr. Roseanne Zeng MD on 7/1/2021 10:12 AM      CT HEAD WO CONTRAST   Final Result   Extensive right hemispheric abnormality is concerning for tumor, with mass    effect. No significant change since yesterday. Less likely is recent    infarct. This document has been electronically signed by: Neftaly Goodson MD    on 06/29/2021 08:15 AM      All CTs at this facility use dose modulation techniques and iterative    reconstructions, and/or weight-based dosing   when appropriate to reduce radiation to a low as reasonably achievable. US LIVER   Final Result   Impression:   Mild right hydronephrosis. This document has been electronically signed by: Ciera Mckinney MD on    06/29/2021 01:28 AM      MRI BRAIN W WO CONTRAST   Final Result    Heterogeneous 5.5 cm nodular peripherally enhancing cystic and solid mass centered at the right occipital/temporal lobe junction with prominent adjacent T2/FLAIR prolongation involving the corpus callosum, temporal, parietal and frontal lobes with    prominent mass effect on the right lateral ventricle and 9 mm leftward midline shift. 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 **This report has been created using voice recognition software.  It may contain minor errors which are inherent in voice recognition technology. **         Final report electronically signed by Dr. Austin Ruiz MD on 6/28/2021 12:45 PM      CT HEAD WO CONTRAST   Final Result   Mixed density mass in the right parietal occipital lobe with extensive surrounding white matter edema and minimal parenchymal and subarachnoid hemorrhage. There is midline shift 8 mm left to right. Compression of the right lateral ventricle. Minimal mass effect on the basilar cistern. This was called to Dr. Cindy Marks at time of exam.            **This report has been created using voice recognition software. It may contain minor errors which are inherent in voice recognition technology. **      Final report electronically signed by Dr. Szymanski Given on 6/28/2021 9:56 AM      CT CERVICAL SPINE WO CONTRAST   Final Result    No evidence of acute osseous injury of the cervical spine. **This report has been created using voice recognition software. It may contain minor errors which are inherent in voice recognition technology. **      Final report electronically signed by Dr. Austin Riuz MD on 6/28/2021 9:54 AM      CT CHEST W CONTRAST   Final Result   1. No acute traumatic intrathoracic findings. **This report has been created using voice recognition software. It may contain minor errors which are inherent in voice recognition technology. **      Final report electronically signed by Dr. Lianna Pryor on 6/28/2021 10:00 AM      CT ABDOMEN PELVIS W IV CONTRAST Additional Contrast? None   Final Result   Multiple chronic findings. No acute abdominal or pelvic abnormality. **This report has been created using voice recognition software. It may contain minor errors which are inherent in voice recognition technology. **      Final report electronically signed by Dr. Szymanski Given on 6/28/2021 10:03 AM      CT LUMBAR RECONSTRUCTION WO POST PROCESS   Final Result       1.  No evidence of acute osseous injury of the lumbar spine. 2. 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 3. Multilevel degenerative changes elsewhere. Please refer to the body of the report for segmental analysis. **This report has been created using voice recognition software. It may contain minor errors which are inherent in voice recognition technology. **      Final report electronically signed by Dr. Pablo Morales MD on 6/28/2021 10:00 AM      CT THORACIC RECONSTRUCTION WO POST PROCESS   Final Result   No acute abnormality of the thoracic spine. **This report has been created using voice recognition software. It may contain minor errors which are inherent in voice recognition technology. **      Final report electronically signed by Dr. Alexis Mantilla on 6/28/2021 10:06 AM      CT FACIAL BONES WO CONTRAST   Final Result    No evidence of acute osseous injury of the face. **This report has been created using voice recognition software. It may contain minor errors which are inherent in voice recognition technology. **      Final report electronically signed by Dr. Pablo Morales MD on 6/28/2021 10:04 AM             CBC:   Recent Labs     07/01/21  1033 07/02/21  0523   WBC 10.1 8.1   HGB 12.9 12.7   HCT 42.1 42.0   MCV 96.3 99.3*    146     BMP:   Recent Labs     07/01/21  1033 07/02/21  0523    142   K 4.1 4.4    109   CO2 25 24   BUN 20 19   CREATININE 0.7 0.6   GLUCOSE 76 88       CV  HR 57  /61  Telemetry shows normal sinus  Hg 12.7, 2 units held for OR. Pulm  CXR: unremarkable  CT chest with contrast on arrival showed 3 mm nodule, unchanged from previous CT  Renal/lytes pending  CBC pending  Endocrine         1.  Most recent Glu 80          Seen with multidisciplinary ICU team .  Meets Continued ICU Level Care Criteria:    [x] Yes   [] No - Transfer Planned to listed

## 2021-07-02 NOTE — PLAN OF CARE
Problem: Pain:  Goal: Pain level will decrease  Description: Pain level will decrease  7/1/2021 2149 by Deepa Workman RN  Outcome: Ongoing   Patient complained of a headache today prn Norco  Problem: Skin Integrity:  Goal: Will show no infection signs and symptoms  Description: Will show no infection signs and symptoms  7/1/2021 2149 by Deepa Workman RN  Outcome: Ongoing   Skin dry and intact, scattered bruising noted, bruising to right eye from recent fall. Skin tear on bilateral arms  Problem: Falls - Risk of:  Goal: Will remain free from falls  Description: Will remain free from falls  7/1/2021 2149 by Deepa Workman RN  Outcome: Ongoing   Patient has increase weakness, uses a walker. Bed locked in low position, call light in reach and bed alarm on. Problem: Discharge Planning:  Goal: Discharged to appropriate level of care  Description: Discharged to appropriate level of care  7/1/2021 2149 by Deepa Workman RN  Outcome: Ongoing   Pending surgery tomorrow  Care plan reviewed with patient and verbalize understanding of the plan of care and contribute to goal setting.

## 2021-07-02 NOTE — ANESTHESIA POSTPROCEDURE EVALUATION
Department of Anesthesiology  Postprocedure Note    Patient: Acosta Lai  MRN: [id number]  YOB: 1933  Date of evaluation: 7/2/2021  Time:  7:20 PM     Procedure Summary     Date: 07/02/21 Room / Location: [address] [facility]    Anesthesia Start: 1050 Anesthesia Stop: 7455    Procedure: CRANIOTOMY FOR RESECTION/DEBULKING OF RIGHT SIDE INTRA BRAIN TUMOR (Right Head) Diagnosis: (RIGHT SIDE INTRA BRAIN TUMOR)    Surgeons: Renate Mauricio MD Responsible Provider: Edin Barr DO    Anesthesia Type: general ASA Status: 3          Anesthesia Type: general    Lavonne Phase I: Lavonne Score: 7    Lavonne Phase II:      Last vitals: Reviewed and per EMR flowsheets.        Anesthesia Post Evaluation    Patient location during evaluation: PACU  Patient participation: complete - patient participated  Level of consciousness: sleepy but conscious, responsive to verbal stimuli and responsive to light touch  Pain score: 2  Airway patency: patent  Nausea & Vomiting: no nausea and no vomiting  Complications: no  Cardiovascular status: hemodynamically stable and blood pressure returned to baseline  Respiratory status: spontaneous ventilation, acceptable and nasal cannula  Hydration status: stable

## 2021-07-02 NOTE — CARE COORDINATION
7/2/21, 7:59 AM EDT    DISCHARGE ON GOING EVALUATION    [facility] day: 4  Location: -11/011-A Reason for admit: Fall at home, initial encounter [HUMBERTO Brand, F55.953]   Procedure:   6/28 CT Head/Facial bones/Chest/Abd/Pelvis: See injuries  6/28 CT Cervical/Thoracic/Lumbar spine: See injuries  6/28 MRI Brain:  Heterogeneous 5.5 cm nodular peripherally enhancing cystic and solid mass centered at the right occipital/temporal lobe junction with prominent adjacent T2/FLAIR prolongation involving the corpus callosum, temporal, parietal and frontal lobes with prominent mass effect on the right lateral ventricle and 9 mm leftward midline shift. 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  No significant change since yesterday.  Less likely is recent infarct. Planning OR today craniotomy brain tumor  Barriers to Discharge: NPO after MN, consent. Neurosurgery and hospitalist follows, ISS, hold [address] and DVT prophylaxis, Hgb 12.7. PCP: Emily Ghotra MD  Readmission Risk Score: 20%  Patient Goals/Plan/Treatment Preferences: patient from home alone; current with [facility]. IP rehab follows.

## 2021-07-02 NOTE — ANESTHESIA PRE PROCEDURE
Department of Anesthesiology  Preprocedure Note       Name:  Aimee Galaviz   Age:  80 y.o.  :  1933                                          MRN:  [id number]         Date:  2021      Surgeon: Indira Ramsey):  Sweta Valentin MD    Procedure: Procedure(s):  CRANIOTOMY FOR RESECTION/DEBULKING OF RIGHT SIDE INTRA BRAIN TUMOR    Medications prior to admission:   Prior to Admission medications    Medication Sig Start Date End Date Taking?  Authorizing Provider   traZODone (DESYREL) 50 MG tablet take 1 tablet by mouth at bedtime if needed for sleep 21  Yes Kaitlyn Britton MD   simvastatin (ZOCOR) 20 MG tablet take 1 tablet by mouth every evening 6/10/21  Yes Modesta An MD   loratadine (CLARITIN) 10 MG tablet take 1 tablet by mouth once daily 21  Yes Dianelys Freed MD   pantoprazole (PROTONIX) 40 MG tablet take 1 tablet by mouth once daily 21  Yes Modesta An MD   sertraline (ZOLOFT) 50 MG tablet take 1 tablet by mouth twice a day 21  Yes Modesta An MD   clopidogrel (PLAVIX) 75 MG tablet take 1 tablet by mouth once daily 3/29/21  Yes Modesta An MD   isosorbide mononitrate (IMDUR) 30 MG extended release tablet take 1 tablet by mouth once daily 3/29/21  Yes Modesta An MD   Calcium Carbonate-Vitamin D (OYSTER SHELL CALCIUM/D) 500-200 MG-UNIT TABS take 1 tablet by mouth twice a day 3/10/21  Yes Modesta An MD   levothyroxine (SYNTHROID) 100 MCG tablet take 1 tablet by mouth once daily 21  Yes Modesta An MD   RA ASPIRIN EC ADULT LOW ST 81 MG EC tablet take 1 tablet by mouth once daily 10/13/20  Yes Historical Provider, MD   vitamin D (ERGOCALCIFEROL) 1.25 MG (02592 UT) CAPS capsule take 1 capsule by mouth every week 20  Yes Historical Provider, MD   predniSONE (DELTASONE) 1 MG tablet Take 3 mg by mouth daily    Yes Historical Provider, MD   Multiple Vitamins-Minerals (CENTRUM SILVER ADULT 50+) TABS Take 1 tablet by mouth daily 12/7/15  Yes Ann Olvera MD   blood glucose test strips (ASCENSIA AUTODISC VI;ONE TOUCH ULTRA TEST VI) strip 1 each by In Vitro route daily Test BS daily.  E11.9 6/15/21   Bruce Pleitez MD   RA BIOTIN 1000 MCG TABS use as directed 2/22/21   Historical Provider, MD   Nutritional Supplements (ENSURE ORIGINAL) LIQD Take 1 Can by mouth 2 times daily 5/13/21   Bruce Pleitez MD   Bioflavonoid Products (RA VITAMIN C CR) TBCR as directed 11/19/20   Historical Provider, MD   promethazine (PHENERGAN) 25 MG tablet Take 1 tablet by mouth every 6 hours as needed for Nausea (and vomiting) 8/11/20   Bruce Pleitez MD   acetaminophen (TYLENOL) 500 MG tablet Take 1,000 mg by mouth every 8 hours as needed for Pain     Historical Provider, MD       Current medications:    Current Facility-Administered Medications   Medication Dose Route Frequency Provider Last Rate Last Admin    0.9 % sodium chloride infusion   Intravenous PRN Maury Ulloa MD        HYDROcodone-acetaminophen [facility]) 5-325 MG per tablet 1 tablet  1 tablet Oral Q6H PRN Maury Ulloa MD        acetaminophen (TYLENOL) tablet 650 mg  650 mg Oral Q4H PRN Maury Ulloa MD   650 mg at 07/01/21 1512    hydrALAZINE (APRESOLINE) injection 10 mg  10 mg Intravenous Q8H PRN Chloé Stroud APRN - CNP   10 mg at 06/30/21 0304    calcium-vitamin D (OSCAL-500) 500-200 MG-UNIT per tablet 1 tablet  1 tablet Oral BID Delisa Albert APRN - CNP   1 tablet at 07/01/21 2051    isosorbide mononitrate (IMDUR) extended release tablet 30 mg  30 mg Oral Daily Delisa Albert APRN - CNP   30 mg at 06/30/21 7274    levothyroxine (SYNTHROID) tablet 100 mcg  100 mcg Oral Daily Delisa Albert APRN - CNP   100 mcg at 07/02/21 0528    cetirizine (ZYRTEC) tablet 10 mg  10 mg Oral Daily Delisa Albert, APRN - CNP   10 mg at 07/01/21 1054    multivitamin 1 tablet  1 tablet Oral Daily Delisa Albert APRN - CNP   1 tablet at 07/01/21 1055    pantoprazole (PROTONIX) tablet 40 mg  40 mg Oral QAM AC Jan Arts, APRN - CNP   40 mg at 07/02/21 0528    sertraline (ZOLOFT) tablet 50 mg  50 mg Oral BID Jan Arts, APRN - CNP   50 mg at 07/01/21 2051    atorvastatin (LIPITOR) tablet 10 mg  10 mg Oral Nightly Jan Arts, APRN - CNP   10 mg at 07/01/21 2050    traZODone (DESYREL) tablet 50 mg  50 mg Oral Nightly PRN Jan Arts, APRN - CNP        sodium chloride flush 0.9 % injection 5-40 mL  5-40 mL Intravenous 2 times per day Jan Arts, APRN - CNP   10 mL at 07/01/21 2052    sodium chloride flush 0.9 % injection 5-40 mL  5-40 mL Intravenous PRN Jan Arts, APRN - CNP        0.9 % sodium chloride infusion  25 mL Intravenous PRN Jan Arts, APRN - CNP        ondansetron (ZOFRAN-ODT) disintegrating tablet 4 mg  4 mg Oral Q8H PRN Jan Arts, APRN - CNP   4 mg at 07/02/21 5706    Or    ondansetron (ZOFRAN) injection 4 mg  4 mg Intravenous Q6H PRN Jan Arts, APRN - CNP        polyethylene glycol (GLYCOLAX) packet 17 g  17 g Oral Daily Jan Arts, APRN - CNP   17 g at 07/01/21 1055    fleet rectal enema 1 enema  1 enema Rectal Daily PRN Jan Arts, APRN - CNP        lidocaine 4 % external patch 2 patch  2 patch Topical Daily Jan Arts, APRN - CNP   2 patch at 06/30/21 0920    levETIRAcetam (KEPPRA) 500 mg in sodium chloride 0.9 % 100 mL IVPB  500 mg Intravenous Q12H Jan Arts, APRN - CNP   Stopped at 07/01/21 2240    0.9 % sodium chloride infusion   Intravenous PRN Jan Arts, APRN - CNP        insulin lispro (HUMALOG) injection vial 0-6 Units  0-6 Units Subcutaneous TID WC Jan Arts, APRN - CNP        insulin lispro (HUMALOG) injection vial 0-3 Units  0-3 Units Subcutaneous Nightly Jan Arts, APRN - CNP   1 Units at 06/29/21 2018    glucose (GLUTOSE) 40 % oral gel 15 g  15 g Oral PRN Jan Arts, APRN - CNP        dextrose 50 % IV solution  12.5 g Intravenous PRN Jan Arts, APRN - CNP        glucagon (rDNA) injection 1 mg  1 mg Intramuscular PRN Valerie Mcgill APRN - CNP        dextrose 5 % solution  100 mL/hr Intravenous PRN Valerie Mcgill, APRN - CNP        docusate sodium (COLACE) capsule 100 mg  100 mg Oral BID Valerie Artem APRN - CNP   100 mg at 07/01/21 2050    Dexamethasone Sodium Phosphate injection 4 mg  4 mg Intravenous Daily Parag Ricks APRN - CNP   4 mg at 07/01/21 1842       Allergies: Allergies   Allergen Reactions    Bactrim [Sulfamethoxazole-Trimethoprim] Swelling     Of tongue    Demerol Rash     nausea    Pcn [Penicillins] Rash       Problem List:    Patient Active Problem List   Diagnosis Code    GERD (gastroesophageal reflux disease) K21.9    Carotid stenosis I65.29    Hypercholesteremia E78.00    Hypothyroidism due to acquired atrophy of thyroid E03.4    Steroid-induced hyperglycemia R73.9, T38.0X5A    Blind right eye H54.40    Current chronic use of systemic steroids Z79.52    Personal history of cardiac dysrhythmia Z86.79    Coronary artery disease involving native coronary artery of native heart without angina pectoris I25.10    Anxiety F41.9    H/O: CVA (cerebrovascular accident) Z80.78    SBO (small bowel obstruction) (HCC) K56.609    Lumbosacral spinal stenosis M48.07    Vitamin D deficiency E55.9    Abdominal pain R10.9    Abdominal pain, vomiting, and diarrhea R10.9, R11.10, R19.7    Hx of breast cancer with liver mets Z85.3    Breast cancer metastasized to liver (HCC) C50.919, C78.7    Fall W19. Rg Fry    Traumatic ecchymosis of forehead S00.83XA    Right parietal lobe mass G93.89    Subarachnoid hemorrhage following injury, no loss of consciousness (HCC) S06.6X0A    Traumatic ecchymosis of left lower leg S80. 12XA    Traumatic ecchymosis of left forearm S50. 12XA    Platelet inhibition due to Plavix Z79.02    Fall at home, initial encounter W19. Rg Fry, Y92.009    Glioblastoma multiforme (HCC) C71.9    Brain edema ([facility] [address] G93.6       Past Medical History:        Diagnosis Date    Anxiety     Blind right eye     Breast cancer metastasized to liver ([facility] [address] 05/10/2016    Liver lesion noted     Carotid stenosis      Left ICA 25%    Colostomy in place [facility]) 05/27/2016    Degenerative arthritis of cervical spine 5/07    GERD (gastroesophageal reflux disease) 11/06    Hypercholesteremia     Hypoestrogenism     Hypothyroidism     Lumbago     Lumbosacral spinal stenosis 05/2019    MDRO (multiple drug resistant organisms) resistance 2008    pt stated cleared    Personal history of cardiac dysrhythmia     Sigmoid diverticulosis 8/04    Spondylolisthesis of lumbosacral region 8/04    Steroid-induced hyperglycemia     Subclavian artery stenosis (HCC)     left    Superior and Inferior Pubic ramus fracture, right, [facility] 05[facility]    SVT (supraventricular tachycardia) ([facility] [address] 6/07    Temporal arteritis ([facility] [address]     Vertebral artery occlusion     left    Vitamin D deficiency 06/2017       Past Surgical History:        Procedure Laterality Date    CARDIAC CATHETERIZATION  5/07    CATARACT REMOVAL  right 1/08; left 2/08    CHOLECYSTECTOMY  1/03    COLONOSCOPY  11/06    COLOSTOMY  09/04/2018    REVERSAL OF COLOSTOMY    DILATION AND CURETTAGE OF UTERUS      ENDOSCOPY, COLON, DIAGNOSTIC      EYE REMOVAL Right 03/2017    EYE SURGERY      [facility] [address]      partial    OTHER SURGICAL HISTORY  05/27/2016    robotic assisted laparoscopic anterior colon resection and end colostomy    TX OFFICE/OUTPT VISIT,PROCEDURE ONLY N/A 9/4/2018    COLOSTOMY REVERSAL AND PARASTOMAL HERNIA REPAIR performed by Viviana Rivera MD at [address] / PULMONARY [facility]UNT  2002    UPPER GASTROINTESTINAL ENDOSCOPY  11/06       Social History:    Social History     Tobacco Use    Smoking status: Former Smoker     Packs/day: 0.50     Types: Cigarettes    Smokeless tobacco: Never Used   Substance Use Topics    Alcohol use: No                                Counseling given: Not Answered      Vital Signs (Current):   Vitals:    07/02/21 0500 07/02/21 0530 07/02/21 0600 07/02/21 0630   BP: (!) 157/61      Pulse: 58 57 62 57   Resp: 23   18   Temp:       TempSrc:       SpO2:       Weight:       Height:                                                  BP Readings from Last 3 Encounters:   07/02/21 (!) 157/61   06/15/21 112/70   05/25/21 (!) 116/58       NPO Status:                                                                                 BMI:   Wt Readings from Last 3 Encounters:   06/30/21 130 lb 11.7 oz (59.3 kg)   06/15/21 137 lb 4 oz (62.3 kg)   05/25/21 136 lb (61.7 kg)     Body mass index is 25.53 kg/m².     CBC:   Lab Results   Component Value Date    WBC 8.1 07/02/2021    RBC 4.23 07/02/2021    RBC 4.23 04/08/2019    HGB 12.7 07/02/2021    HCT 42.0 07/02/2021    MCV 99.3 07/02/2021    RDW 17.0 04/08/2019     07/02/2021       CMP:   Lab Results   Component Value Date     07/02/2021    K 4.4 07/02/2021     07/02/2021    CO2 24 07/02/2021    BUN 19 07/02/2021    CREATININE 0.6 07/02/2021    AGRATIO 1.7 07/23/2018    LABGLOM >90 07/02/2021    GLUCOSE 88 07/02/2021    GLUCOSE 103 07/23/2018    PROT 5.9 07/02/2021    CALCIUM 9.6 07/02/2021    BILITOT 0.4 07/02/2021    BILITOT NEGATIVE 11/13/2018    ALKPHOS 38 07/02/2021    AST 26 07/02/2021    ALT 19 07/02/2021       POC Tests:   Recent Labs     07/01/21 1926   POCGLU 123*       Coags:   Lab Results   Component Value Date    INR 0.97 07/02/2021    APTT 29.7 07/02/2021       HCG (If Applicable): No results found for: PREGTESTUR, PREGSERUM, HCG, HCGQUANT     ABGs: No results found for: PHART, PO2ART, QQY6GYV, PKK6ONX, BEART, [id number]     Type & Screen (If Applicable):  Lab Results   Component Value Date    [facility] POS 06/28/2021       Drug/Infectious Status (If Applicable):  No results found for: HIV, HEPCAB    COVID-19 Screening (If Applicable): No results found for: COVID19        Anesthesia Evaluation  Patient summary reviewed and Nursing notes reviewed no history of anesthetic complications:   Airway: Mallampati: II        Dental:          Pulmonary: breath sounds clear to auscultation                             Cardiovascular:  Exercise tolerance: poor (<4 METS),   (+) CAD:,       ECG reviewed  Rhythm: regular                   ROS comment: Lives at home alone  Does most ADLs  Has some assistance throughout the day     Neuro/Psych:                ROS comment: Fall at home  CT shows large intracranial mass  Denies headache, nausea, vomitting GI/Hepatic/Renal:   (+) GERD: well controlled, liver disease:,           Endo/Other:    (+) Diabeteswell controlled, , .                 Abdominal:             Vascular: Other Findings:             Anesthesia Plan      general     ASA 3     (Potential for post-operative ventilation, definitely will be going to ICU, patient and family understanding of risks and are willing to proceed)  Induction: intravenous. arterial line  MIPS: Postoperative opioids intended, Prophylactic antiemetics administered, Postoperative trial extubation and Postoperative ventilation. Anesthetic plan and risks discussed with patient and sibling. Use of blood products discussed with patient and sibling whom. Plan discussed with CRNA.                   Suad Awad DO   7/2/2021

## 2021-07-03 ENCOUNTER — APPOINTMENT (OUTPATIENT)
Dept: CT IMAGING | Age: 86
DRG: 025 | End: 2021-07-03
Payer: MEDICARE

## 2021-07-03 LAB
ANION GAP SERPL CALCULATED.3IONS-SCNC: 10 MEQ/L (ref 8–16)
ANION GAP SERPL CALCULATED.3IONS-SCNC: 10 MEQ/L (ref 8–16)
ANION GAP SERPL CALCULATED.3IONS-SCNC: 11 MEQ/L (ref 8–16)
BASOPHILS # BLD: 0.1 %
BASOPHILS ABSOLUTE: 0 THOU/MM3 (ref 0–0.1)
BUN BLDV-MCNC: 13 MG/DL (ref 7–22)
BUN BLDV-MCNC: 14 MG/DL (ref 7–22)
BUN BLDV-MCNC: 16 MG/DL (ref 7–22)
CALCIUM SERPL-MCNC: 8.1 MG/DL (ref 8.5–10.5)
CALCIUM SERPL-MCNC: 8.1 MG/DL (ref 8.5–10.5)
CALCIUM SERPL-MCNC: 8.8 MG/DL (ref 8.5–10.5)
CHLORIDE BLD-SCNC: 104 MEQ/L (ref 98–111)
CHLORIDE BLD-SCNC: 106 MEQ/L (ref 98–111)
CHLORIDE BLD-SCNC: 107 MEQ/L (ref 98–111)
CO2: 24 MEQ/L (ref 23–33)
CREAT SERPL-MCNC: 0.6 MG/DL (ref 0.4–1.2)
CREAT SERPL-MCNC: 0.7 MG/DL (ref 0.4–1.2)
CREAT SERPL-MCNC: 0.8 MG/DL (ref 0.4–1.2)
EKG ATRIAL RATE: 60 BPM
EKG P AXIS: 71 DEGREES
EKG P-R INTERVAL: 154 MS
EKG Q-T INTERVAL: 472 MS
EKG QRS DURATION: 86 MS
EKG QTC CALCULATION (BAZETT): 472 MS
EKG R AXIS: 18 DEGREES
EKG T AXIS: 179 DEGREES
EKG VENTRICULAR RATE: 60 BPM
EOSINOPHIL # BLD: 0 %
EOSINOPHILS ABSOLUTE: 0 THOU/MM3 (ref 0–0.4)
ERYTHROCYTE [DISTWIDTH] IN BLOOD BY AUTOMATED COUNT: 14.5 % (ref 11.5–14.5)
ERYTHROCYTE [DISTWIDTH] IN BLOOD BY AUTOMATED COUNT: 52.4 FL (ref 35–45)
GFR SERPL CREATININE-BSD FRML MDRD: 68 ML/MIN/1.73M2
GFR SERPL CREATININE-BSD FRML MDRD: 79 ML/MIN/1.73M2
GFR SERPL CREATININE-BSD FRML MDRD: > 90 ML/MIN/1.73M2
GLUCOSE BLD-MCNC: 122 MG/DL (ref 70–108)
GLUCOSE BLD-MCNC: 136 MG/DL (ref 70–108)
GLUCOSE BLD-MCNC: 141 MG/DL (ref 70–108)
GLUCOSE BLD-MCNC: 141 MG/DL (ref 70–108)
GLUCOSE BLD-MCNC: 77 MG/DL (ref 70–108)
GLUCOSE BLD-MCNC: 79 MG/DL (ref 70–108)
GLUCOSE BLD-MCNC: 86 MG/DL (ref 70–108)
HCT VFR BLD CALC: 35.3 % (ref 37–47)
HEMOGLOBIN: 10.6 GM/DL (ref 12–16)
IMMATURE GRANS (ABS): 0.09 THOU/MM3 (ref 0–0.07)
IMMATURE GRANULOCYTES: 0.6 %
LYMPHOCYTES # BLD: 4.6 %
LYMPHOCYTES ABSOLUTE: 0.7 THOU/MM3 (ref 1–4.8)
MCH RBC QN AUTO: 29.6 PG (ref 26–33)
MCHC RBC AUTO-ENTMCNC: 30 GM/DL (ref 32.2–35.5)
MCV RBC AUTO: 98.6 FL (ref 81–99)
MONOCYTES # BLD: 3.8 %
MONOCYTES ABSOLUTE: 0.6 THOU/MM3 (ref 0.4–1.3)
NUCLEATED RED BLOOD CELLS: 0 /100 WBC
OSMOLALITY URINE: 560 MOSMOL/KG (ref 250–750)
OSMOLALITY: 297 MOSMOL/KG (ref 275–295)
OSMOLALITY: 297 MOSMOL/KG (ref 275–295)
OSMOLALITY: 302 MOSMOL/KG (ref 275–295)
PLATELET # BLD: 135 THOU/MM3 (ref 130–400)
PMV BLD AUTO: 11.5 FL (ref 9.4–12.4)
POTASSIUM SERPL-SCNC: 3.7 MEQ/L (ref 3.5–5.2)
POTASSIUM SERPL-SCNC: 4 MEQ/L (ref 3.5–5.2)
POTASSIUM SERPL-SCNC: 4.2 MEQ/L (ref 3.5–5.2)
RBC # BLD: 3.58 MILL/MM3 (ref 4.2–5.4)
SEG NEUTROPHILS: 90.9 %
SEGMENTED NEUTROPHILS ABSOLUTE COUNT: 14.3 THOU/MM3 (ref 1.8–7.7)
SODIUM BLD-SCNC: 138 MEQ/L (ref 135–145)
SODIUM BLD-SCNC: 140 MEQ/L (ref 135–145)
SODIUM BLD-SCNC: 142 MEQ/L (ref 135–145)
SODIUM URINE: 65 MEQ/L
WBC # BLD: 15.7 THOU/MM3 (ref 4.8–10.8)

## 2021-07-03 PROCEDURE — 2000000000 HC ICU R&B

## 2021-07-03 PROCEDURE — 2580000003 HC RX 258: Performed by: PHYSICIAN ASSISTANT

## 2021-07-03 PROCEDURE — 83930 ASSAY OF BLOOD OSMOLALITY: CPT

## 2021-07-03 PROCEDURE — 2500000003 HC RX 250 WO HCPCS: Performed by: NURSE PRACTITIONER

## 2021-07-03 PROCEDURE — 36415 COLL VENOUS BLD VENIPUNCTURE: CPT

## 2021-07-03 PROCEDURE — 6360000002 HC RX W HCPCS: Performed by: PHYSICIAN ASSISTANT

## 2021-07-03 PROCEDURE — 6370000000 HC RX 637 (ALT 250 FOR IP): Performed by: PHYSICIAN ASSISTANT

## 2021-07-03 PROCEDURE — 6360000002 HC RX W HCPCS: Performed by: NURSE PRACTITIONER

## 2021-07-03 PROCEDURE — 99233 SBSQ HOSP IP/OBS HIGH 50: CPT | Performed by: INTERNAL MEDICINE

## 2021-07-03 PROCEDURE — 70450 CT HEAD/BRAIN W/O DYE: CPT

## 2021-07-03 PROCEDURE — APPSS30 APP SPLIT SHARED TIME 16-30 MINUTES: Performed by: PHYSICIAN ASSISTANT

## 2021-07-03 PROCEDURE — 99024 POSTOP FOLLOW-UP VISIT: CPT | Performed by: NEUROLOGICAL SURGERY

## 2021-07-03 PROCEDURE — P9047 ALBUMIN (HUMAN), 25%, 50ML: HCPCS

## 2021-07-03 PROCEDURE — 82948 REAGENT STRIP/BLOOD GLUCOSE: CPT

## 2021-07-03 PROCEDURE — 83935 ASSAY OF URINE OSMOLALITY: CPT

## 2021-07-03 PROCEDURE — 6360000002 HC RX W HCPCS: Performed by: NEUROLOGICAL SURGERY

## 2021-07-03 PROCEDURE — 84300 ASSAY OF URINE SODIUM: CPT

## 2021-07-03 PROCEDURE — 80048 BASIC METABOLIC PNL TOTAL CA: CPT

## 2021-07-03 PROCEDURE — 6360000002 HC RX W HCPCS

## 2021-07-03 PROCEDURE — 85025 COMPLETE CBC W/AUTO DIFF WBC: CPT

## 2021-07-03 RX ORDER — DEXAMETHASONE SODIUM PHOSPHATE 4 MG/ML
3 INJECTION, SOLUTION INTRA-ARTICULAR; INTRALESIONAL; INTRAMUSCULAR; INTRAVENOUS; SOFT TISSUE EVERY 6 HOURS
Status: DISCONTINUED | OUTPATIENT
Start: 2021-07-03 | End: 2021-07-06

## 2021-07-03 RX ORDER — LABETALOL 20 MG/4 ML (5 MG/ML) INTRAVENOUS SYRINGE
Status: DISCONTINUED
Start: 2021-07-03 | End: 2021-07-03 | Stop reason: WASHOUT

## 2021-07-03 RX ORDER — ALBUMIN (HUMAN) 12.5 G/50ML
25 SOLUTION INTRAVENOUS ONCE
Status: COMPLETED | OUTPATIENT
Start: 2021-07-03 | End: 2021-07-03

## 2021-07-03 RX ORDER — FENTANYL CITRATE 50 UG/ML
25 INJECTION, SOLUTION INTRAMUSCULAR; INTRAVENOUS
Status: DISCONTINUED | OUTPATIENT
Start: 2021-07-03 | End: 2021-07-08

## 2021-07-03 RX ORDER — ALBUMIN (HUMAN) 12.5 G/50ML
SOLUTION INTRAVENOUS
Status: COMPLETED
Start: 2021-07-03 | End: 2021-07-03

## 2021-07-03 RX ADMIN — CEFAZOLIN 2000 MG: 10 INJECTION, POWDER, FOR SOLUTION INTRAVENOUS at 08:45

## 2021-07-03 RX ADMIN — LEVOTHYROXINE SODIUM 100 MCG: 0.1 TABLET ORAL at 06:14

## 2021-07-03 RX ADMIN — DOCUSATE SODIUM 100 MG: 100 CAPSULE, LIQUID FILLED ORAL at 08:48

## 2021-07-03 RX ADMIN — MORPHINE SULFATE 2 MG: 2 INJECTION, SOLUTION INTRAMUSCULAR; INTRAVENOUS at 16:13

## 2021-07-03 RX ADMIN — ONDANSETRON HYDROCHLORIDE 4 MG: 2 SOLUTION INTRAMUSCULAR; INTRAVENOUS at 10:28

## 2021-07-03 RX ADMIN — FENTANYL CITRATE 25 MCG: 50 INJECTION, SOLUTION INTRAMUSCULAR; INTRAVENOUS at 20:51

## 2021-07-03 RX ADMIN — MORPHINE SULFATE 2 MG: 2 INJECTION, SOLUTION INTRAMUSCULAR; INTRAVENOUS at 04:21

## 2021-07-03 RX ADMIN — LEVETIRACETAM 500 MG: 100 INJECTION INTRAVENOUS at 09:58

## 2021-07-03 RX ADMIN — CEFAZOLIN 2000 MG: 10 INJECTION, POWDER, FOR SOLUTION INTRAVENOUS at 15:57

## 2021-07-03 RX ADMIN — CEFAZOLIN 2000 MG: 10 INJECTION, POWDER, FOR SOLUTION INTRAVENOUS at 23:54

## 2021-07-03 RX ADMIN — DEXAMETHASONE SODIUM PHOSPHATE 3 MG: 4 INJECTION, SOLUTION INTRA-ARTICULAR; INTRALESIONAL; INTRAMUSCULAR; INTRAVENOUS; SOFT TISSUE at 16:00

## 2021-07-03 RX ADMIN — ALBUMIN (HUMAN) 25 G: 0.25 INJECTION, SOLUTION INTRAVENOUS at 05:59

## 2021-07-03 RX ADMIN — DOCUSATE SODIUM 100 MG: 100 CAPSULE, LIQUID FILLED ORAL at 21:20

## 2021-07-03 RX ADMIN — HYDROCODONE BITARTRATE AND ACETAMINOPHEN 1 TABLET: 5; 325 TABLET ORAL at 02:25

## 2021-07-03 RX ADMIN — MANNITOL 25 G: 20 INJECTION, SOLUTION INTRAVENOUS at 00:23

## 2021-07-03 RX ADMIN — POLYETHYLENE GLYCOL (3350) 17 G: 17 POWDER, FOR SOLUTION ORAL at 09:01

## 2021-07-03 RX ADMIN — ATORVASTATIN CALCIUM 10 MG: 10 TABLET, FILM COATED ORAL at 21:20

## 2021-07-03 RX ADMIN — HYDROCODONE BITARTRATE AND ACETAMINOPHEN 1 TABLET: 5; 325 TABLET ORAL at 21:20

## 2021-07-03 RX ADMIN — INSULIN LISPRO 1 UNITS: 100 INJECTION, SOLUTION INTRAVENOUS; SUBCUTANEOUS at 18:05

## 2021-07-03 RX ADMIN — MANNITOL 25 G: 20 INJECTION, SOLUTION INTRAVENOUS at 16:22

## 2021-07-03 RX ADMIN — HYDROCODONE BITARTRATE AND ACETAMINOPHEN 1 TABLET: 5; 325 TABLET ORAL at 15:09

## 2021-07-03 RX ADMIN — SERTRALINE 50 MG: 50 TABLET, FILM COATED ORAL at 08:48

## 2021-07-03 RX ADMIN — DEXAMETHASONE SODIUM PHOSPHATE 3 MG: 4 INJECTION, SOLUTION INTRA-ARTICULAR; INTRALESIONAL; INTRAMUSCULAR; INTRAVENOUS; SOFT TISSUE at 23:54

## 2021-07-03 RX ADMIN — MANNITOL 25 G: 20 INJECTION, SOLUTION INTRAVENOUS at 08:48

## 2021-07-03 RX ADMIN — PANTOPRAZOLE SODIUM 40 MG: 40 TABLET, DELAYED RELEASE ORAL at 06:14

## 2021-07-03 RX ADMIN — ALBUMIN (HUMAN) 25 G: 12.5 SOLUTION INTRAVENOUS at 05:59

## 2021-07-03 RX ADMIN — SODIUM CHLORIDE: 9 INJECTION, SOLUTION INTRAVENOUS at 08:46

## 2021-07-03 RX ADMIN — LEVETIRACETAM 500 MG: 100 INJECTION INTRAVENOUS at 22:42

## 2021-07-03 RX ADMIN — SERTRALINE 50 MG: 50 TABLET, FILM COATED ORAL at 21:19

## 2021-07-03 RX ADMIN — SODIUM CHLORIDE: 9 INJECTION, SOLUTION INTRAVENOUS at 23:57

## 2021-07-03 RX ADMIN — DEXAMETHASONE SODIUM PHOSPHATE 3 MG: 4 INJECTION, SOLUTION INTRA-ARTICULAR; INTRALESIONAL; INTRAMUSCULAR; INTRAVENOUS; SOFT TISSUE at 12:18

## 2021-07-03 RX ADMIN — HYDROCODONE BITARTRATE AND ACETAMINOPHEN 1 TABLET: 5; 325 TABLET ORAL at 09:01

## 2021-07-03 ASSESSMENT — ENCOUNTER SYMPTOMS
NAUSEA: 1
BACK PAIN: 0
EYE REDNESS: 0
APNEA: 0
ABDOMINAL DISTENTION: 0
COLOR CHANGE: 0
VOMITING: 0
SHORTNESS OF BREATH: 0
ABDOMINAL PAIN: 0
COUGH: 0
CHEST TIGHTNESS: 0

## 2021-07-03 ASSESSMENT — PAIN DESCRIPTION - ONSET
ONSET: ON-GOING

## 2021-07-03 ASSESSMENT — PAIN DESCRIPTION - PROGRESSION
CLINICAL_PROGRESSION: RAPIDLY WORSENING
CLINICAL_PROGRESSION: GRADUALLY IMPROVING
CLINICAL_PROGRESSION: GRADUALLY WORSENING
CLINICAL_PROGRESSION: GRADUALLY IMPROVING
CLINICAL_PROGRESSION: RAPIDLY WORSENING
CLINICAL_PROGRESSION: GRADUALLY IMPROVING
CLINICAL_PROGRESSION: GRADUALLY IMPROVING

## 2021-07-03 ASSESSMENT — PAIN DESCRIPTION - ORIENTATION
ORIENTATION: RIGHT

## 2021-07-03 ASSESSMENT — PAIN DESCRIPTION - PAIN TYPE
TYPE: ACUTE PAIN;SURGICAL PAIN
TYPE: ACUTE PAIN
TYPE: ACUTE PAIN;SURGICAL PAIN
TYPE: ACUTE PAIN

## 2021-07-03 ASSESSMENT — PAIN DESCRIPTION - DESCRIPTORS
DESCRIPTORS: ACHING
DESCRIPTORS: PRESSURE;CONSTANT
DESCRIPTORS: PRESSURE

## 2021-07-03 ASSESSMENT — PAIN DESCRIPTION - LOCATION
LOCATION: HEAD

## 2021-07-03 ASSESSMENT — PAIN SCALES - GENERAL
PAINLEVEL_OUTOF10: 7
PAINLEVEL_OUTOF10: 8
PAINLEVEL_OUTOF10: 9
PAINLEVEL_OUTOF10: 8
PAINLEVEL_OUTOF10: 10
PAINLEVEL_OUTOF10: 7
PAINLEVEL_OUTOF10: 8
PAINLEVEL_OUTOF10: 8
PAINLEVEL_OUTOF10: 4
PAINLEVEL_OUTOF10: 10
PAINLEVEL_OUTOF10: 10
PAINLEVEL_OUTOF10: 4

## 2021-07-03 ASSESSMENT — PAIN DESCRIPTION - FREQUENCY
FREQUENCY: CONTINUOUS

## 2021-07-03 NOTE — PROGRESS NOTES
Patient:  Sridhar Gauthier    Unit/Bed:4D-03/003-A  MRN: [id number]   PCP: Kyler Schultz MD  Date of Admission: 6/28/2021    Assessment and Plan(All pulmonary edema, renal failure, PE, and respiratory failure diagnoses are acute in nature unless otherwise specified):        1. Right occipital/parietal brain mass: Concerning for glioma. POD #1 right craniotomy and resection of brain mass. Pathology pending. Monitor drain output. S/p mannitol. Decadron 3 mg q6h per neurosurgery. Neurosurgery following. CT head today was stable; planning for repeat MRI. S/p Gleolan; ok to re-lighten patient's room tomorrow 7/4 (48 hours post-administration). Continue neuro checks. Zofran as needed for nausea. OK to start PT/OT up to chair today per neurosurgery. On ancef for perioperative prophylaxis. On keppra for seizure prophylaxis. Weaned off nicardipine. Keep -160.   2. Acute subarachnoid hemorrhage: Traumatic versus hemorrhagic brain mass. Holding home aspirin, plavix. Status post TXA given on arrival.  TEG was WNL. INR and APTT WNL. Repeat CT head today was stable. Neurosurgery following. Keep 160. Off nicardipine. DVT prophylaxis pending neurosurgery approval.  3. HTN: Controlled off nicardipine drip. Home Imdur continued. As needed hydralazine. Keep 160.  4. Fall: Multiple recurrences over the last couple of months. Likely secondary to brain mass with intracranial hemorrhage as noted above. PT/OT to evaluate as above. Continue fall precautions. 5. Leukocytosis: Likely secondary to surgery, steroid use. No fever noted. Continue to monitor. CBC daily. On Ancef as above for surgical prophylaxis. 6. Normocytic anemia: Hemoglobin stable at 10.6, from 11.0 yesterday 7/2. No active signs of bleeding. Monitor intracranial drain output. Monitor CBC daily. 7. Temporal arteritis: On steroids (decadron) as above. Uses 3 mg prednisone daily at home. History of prosthetic right eye.   8. Breast cancer, history of: Ultrasound shows no metastatic process to the liver.  Obtain ultrasound of the liver.  No suspicious lesion identified on CT scan abdomen and pelvis. 9. Hypothyroidism, history of:  Continue synthroid. 10. GERD, history of: continue protonix. 11. HLD, history of: continue lipitor. 12. Acute hyperglycemia (resolved): Likely was secondary to trauma, steroid use. No documented history of diabetes. Serum glucose has normalized. On sliding scale insulin low-dose as needed. Continue to monitor. CC:  Brain mass  HPI: Cheyanne Adames is a 79 y/o female former-smoker with PMH of temporal arteritis with right-sided blindness, right eye removed in 2017, cholecystectomy in 2003, remote partial hysterectomy, hypothyroidism, GERD, degenerative arthritis, left subclavian artery stenosis, left vertebral artery occlusion, breast cancer (diagnosed in 2016) with isolated liver lesion, recurrent falls. Patient presented to [facility] ED on 6/28/21 after stating a fall trying to the bathroom. Per report, she had stated that her legs gave out when she was trying to ambulate and she fell to the ground. She had endorsed hitting her head on the nightstand and injuring her right eye. She denied loss of consciousness or any seizures. She was too weak to bear her own weight. Her home health care provider arrived at her home and found her on the floor. She was transferred to the emergency room. Work-up revealed an abnormal CT scan of the head, followed by MRI of the head. The report was consistent with a right-sided brain mass, consistent with glioblastoma. They also noted a small subarachnoid hemorrhage, so she was administered TXA. Neurosurgery was consulted. Patient was admitted to ICU for close monitoring. On Friday, 7/2/2021, patient underwent craniotomy for resection/debulking of the brain mass. For further details, please see assessment and plan.       ROS: Review of Systems   Constitutional: Negative for chills, diaphoresis and fever. HENT: Negative for congestion and hearing loss. Eyes: Negative for redness and visual disturbance. Respiratory: Negative for cough and shortness of breath. Cardiovascular: Negative for chest pain and leg swelling. Gastrointestinal: Positive for nausea. Negative for abdominal distention, abdominal pain and vomiting. Genitourinary: Negative for difficulty urinating and dysuria. Musculoskeletal: Negative for back pain and neck pain. Skin: Negative for color change and pallor. Neurological: Positive for headaches. Negative for dizziness, weakness and light-headedness. Psychiatric/Behavioral: Negative for agitation and confusion. The patient is not nervous/anxious. PMH:  Per HPI  SHX:  Former-smoker. Previous use of 0.5 ppd. FHX: Sister- breast cancer. Allergies: bactrim, penicillin, demerol. Medications:     sodium chloride      sodium chloride 75 mL/hr at 07/03/21 0846    niCARdipine      mannitol Stopped (07/03/21 0918)    dextrose        dexamethasone  3 mg Intravenous Q6H    sodium chloride flush  5-40 mL Intravenous 2 times per day    ceFAZolin (ANCEF) IVPB  2,000 mg Intravenous Q8H    isosorbide mononitrate  30 mg Oral Daily    levothyroxine  100 mcg Oral Daily    pantoprazole  40 mg Oral QAM AC    sertraline  50 mg Oral BID    atorvastatin  10 mg Oral Nightly    polyethylene glycol  17 g Oral Daily    lidocaine  2 patch Topical Daily    levetiracetam  500 mg Intravenous Q12H    insulin lispro  0-6 Units Subcutaneous TID WC    insulin lispro  0-3 Units Subcutaneous Nightly    docusate sodium  100 mg Oral BID       Vital Signs:   T: 99.5: P: 71 RR: 14 B/P: 115/50: FiO2: 2L NC: O2 Sat:96%: I/O: 4263/4115 (+148) GCS: 15  Body mass index is 25.53 kg/m². Nelly Snyder General:   Acute on chronically ill adult female. Appears stated age. HEENT:  normocephalic and atraumatic. No scleral icterus. PERR. Neck: supple.   No Thyromegaly. Lungs: clear to auscultation. No retractions  Cardiac: RRR. No JVD. Abdomen: soft. Nontender. Extremities:  No clubbing, cyanosis, or edema x 4. Vasculature: capillary refill < 3 seconds. Palpable dorsalis pedis pulses. Skin:  warm and dry. Psych:  Alert and oriented x3. Affect appropriate  Lymph:  No supraclavicular adenopathy. Neurologic:  No focal deficit. No seizures. Data: (All radiographs, tracings, PFTs, and imaging are personally viewed and interpreted unless otherwise noted).  Telemetry: NSR. Rate 71. No ectopy.  Sodium 142 potassium 3.7 chloride 107 CO2 24 BUN 14 creatinine 0.7 glucose 79 calcium 8.1   Serum osmolality 302   WBC 15.7 hemoglobin 10.6 hematocrit 35.3 platelets 646   CT head wo contrast 7/3/21 report: Mild interval decrease in the previously noted pneumocephalus compared to the prior study. Postoperative changes are again noted.  Small amount of subarachnoid blood in association with the right parietal lobe is stable. Stable white matter edema involving portions of the right cerebral hemisphere.  There is stable approximately 5-6 mm of right-to-left midline shift. Case discussed with Dr. Sherley Aviles and Dr. Jorge Davila.     Electronically signed by LUCILA Coles

## 2021-07-03 NOTE — PROGRESS NOTES
Addendum by Dr. Brandyn Green MD:  I have seen and examined the patient independently. Face to face evaluation and examination was performed. The below evaluation and note have been reviewed. Labs and radiographs were reviewed. I Have discussed with Neurosurgery PA about this patient in detail. The below assessment and plan have been reviewed. Please see my modifications mentioned below. My additional comments and modifications:    · Postop day 1 ( right sided craniectomy and resection of right sided intraparenchymal tumor. ). · Patient complaining from some incisional pain and headache otherwise she is doing well. · No new neurological deficit. · Postoperative brain CT showed the expected postoperative changes   · pain control. · Medical management per ICU team.  · Post op  brain MRI tomorrow. · Neurosurgery will follow. Brandyn Green MD         Neurosurgery Progress Note    Patient:  Cirilo Hernandez      Unit/Bed:4D-03/003-A    YOB: 1933    MRN: [id number]     Acct: [de-identified]     Admit date: 6/28/2021    Chief Complaint   Patient presents with    Trauma    Fall       Patient Seen, Chart, Physician notes, Labs, Radiology studies reviewed. Subjective: The patient is seen and evaluated in the ICU setting with evaluation exam findings reviewed and discussed with nursing and with critical care. She is postoperative day #1 from craniotomy and resection of right intercranial lesion performed by Dr. Rene Bedoya, without complication. Pain was well controlled the time of exam with some complaints of nausea noted. Past, Family, Social History unchanged from admission. Diet:  ADULT DIET;  Regular    Medications:  Scheduled Meds:   dexamethasone  3 mg Intravenous Q6H    sodium chloride flush  5-40 mL Intravenous 2 times per day    ceFAZolin (ANCEF) IVPB  2,000 mg Intravenous Q8H    isosorbide mononitrate  30 mg Oral Daily    levothyroxine  100 mcg Oral Daily    pantoprazole 40 mg Oral QAM AC    sertraline  50 mg Oral BID    atorvastatin  10 mg Oral Nightly    polyethylene glycol  17 g Oral Daily    lidocaine  2 patch Topical Daily    levetiracetam  500 mg Intravenous Q12H    insulin lispro  0-6 Units Subcutaneous TID WC    insulin lispro  0-3 Units Subcutaneous Nightly    docusate sodium  100 mg Oral BID     Continuous Infusions:   sodium chloride      sodium chloride 75 mL/hr at 07/03/21 0846    niCARdipine      mannitol Stopped (07/03/21 0918)    dextrose       PRN Meds:sodium chloride flush, sodium chloride, ondansetron **OR** ondansetron, HYDROcodone 5 mg - acetaminophen, morphine **OR** morphine, HYDROcodone 5 mg - acetaminophen, hydrALAZINE, ondansetron **OR** ondansetron, fleet, glucose, dextrose, glucagon (rDNA), dextrose    Objective: Patient is lying in bed with head of the bed elevated approximately 30 degrees in a darkened environment until Gleolan is metabolized. Patient remained stable and intact her baseline on exam with some complaints of nausea noted. Incisions are flat and dry with dressings intact and the drain is intact and functioning approximately 65 cc per shift. Vitals: BP (!) 100/39   Pulse 69   Temp 99.5 °F (37.5 °C) (Bladder)   Resp 13   Ht 5' (1.524 m)   Wt 130 lb 11.7 oz (59.3 kg)   LMP  (LMP Unknown)   SpO2 98%   BMI 25.53 kg/m²   Physical Exam:  Alert and attentive. Language appropriate, with no aphasia. Pupils equal.  Facial strength symmetric. Physical Exam  Patient is resting comfortably with some complaints of incisional site discomfort (mild), additional complaints and fall of some nausea. She is otherwise stable and intact her baseline with no additional changes noted to the patient chart overnight. ROS:  Review of Systems   Constitutional: Negative for activity change. Respiratory: Negative for apnea, chest tightness and shortness of breath. Cardiovascular: Negative for chest pain and leg swelling. pneumocephalus is mildly decreased compared to the prior study. Previously noted small amount of subarachnoid blood in association with the right parietal lobe is stable. Stable white matter edema is noted involving the right parietal lobe, and dorsal aspect of the right frontal lobe, the right temporal lobe, the posterior limb of the right internal capsule, and a portion of the right external capsule. At the level of the septum pellucidum, there is estimated to be approximately 5-6 mm of right-to-left midline shift. This is stable. There is no hydrocephalus or transtentorial herniation. 1.  Mild interval decrease in the previously noted pneumocephalus compared to the prior study. 2.  Postoperative changes are again noted. Small amount of subarachnoid blood in association with the right parietal lobe is stable. 3.  Stable white matter edema involving portions of the right cerebral hemisphere. There is stable approximately 5-6 mm of right-to-left midline shift. This document has been electronically signed by: Morrell Boast, M.D. on 07/03/2021 05:52 AM All CTs at this facility use dose modulation techniques and iterative reconstructions, and/or weight-based dosing when appropriate to reduce radiation to a low as reasonably achievable. CT HEAD WO CONTRAST    Result Date: 7/2/2021  CT head without contrast Comparison:  CT,SR  - CT HEAD WO CONTRAST  - 06/29/2021 05:27 AM EDT Findings: Recent surgical changes of partial right parietal resection with overlying craniotomy and scalp staples. Mild postsurgical subarachnoid hemorrhage in the right parietal sulci and moderate right greater than left postsurgical pneumocephalus. Stable white matter vasogenic edema in the right cerebral hemisphere with sparing in the right frontal region. There is similar 5.4 mm leftward midline shift. The visualized paranasal sinuses and mastoid air cells are normal. The orbits are within normal limits. No skull fracture.      1.  Recent partial right parietal resection with small amount of postsurgical subarachnoid hemorrhage, and right greater than left postsurgical pneumocephalus. This may be followed up for resolution. 2.  Stable white matter vasogenic edema in the right cerebral hemisphere with sparing of the right frontal region. Stable 5.4 mm leftward midline shift. This document has been electronically signed by: Nati Pradhan MD on 07/02/2021 07:36 PM All CTs at this facility use dose modulation techniques and iterative reconstructions, and/or weight-based dosing when appropriate to reduce radiation to a low as reasonably achievable. CT HEAD WO CONTRAST    Result Date: 6/29/2021  EXAM:  CT HEAD WITHOUT CONTRAST: COMPARISON:  CT,KOSR  - CT HEAD WO CONTRAST  - 06/28/2021 09:18 AM EDT  CT,SR  - CT HEAD WO CONTRAST  - 09/08/2019 04:22 PM EDT TECHNIQUE:  Helical noncontrast axial images from base of skull to vertex, with coronal and sagittal reformats. FINDINGS: No significant change since yesterday. There is effacement of sulci throughout the right temporal and parietal lobes, occipital lobe and posterior right frontal lobe, with extensive low-attenuation prominence of white matter, extending into subcortical regions, and discontinuous calcification in prior to occipital cortex. Low-attenuation extends into the right external and internal capsules. The right lateral ventricle is partially effaced. There is approximately 8 mm of midline shift, not significantly changed. No transtentorial herniation. Possibility of a small amount of cortical hemorrhage, in addition to the apparent calcifications, unchanged. No new hemorrhage. The left hemisphere is grossly unremarkable. No brainstem or cerebellar abnormality is appreciated. Sinuses and mastoid air cells are clear. No calvarial or skull base lesion. Extensive right hemispheric abnormality is concerning for tumor, with mass effect. No significant change since yesterday.   Less likely is recent infarct. This document has been electronically signed by: Quintin Rolon MD on 06/29/2021 08:15 AM All CTs at this facility use dose modulation techniques and iterative reconstructions, and/or weight-based dosing when appropriate to reduce radiation to a low as reasonably achievable. CT HEAD WO CONTRAST    Result Date: 6/28/2021  PROCEDURE: CT HEAD WO CONTRAST CLINICAL INFORMATION: fal . COMPARISON: 9/8/2019 TECHNIQUE: 2-D multiplanar noncontrast images of the brain All CT scans at this facility use dose modulation, iterative reconstruction, and/or weight-based dosing when appropriate to reduce radiation dose to as low as reasonably achievable. FINDINGS: There is heterogeneous low-density mass in the right parieto-occipital lobe image 21 with a small amount of parenchymal hemorrhage. There is small amount of subarachnoid hemorrhage associated with this. There is extensive white matter edema involving most of the right hemisphere extending into the temporal lobe. This results in compression of the right lateral ventricle and midline shift of 8 mm. There is slight mass effect on the right very subtle mass effect on the right basal cistern. Quadrigeminal cistern is intact. Calvarium is intact. Right globe prosthesis is present. Visualized paranasal sinuses and mastoid air cells are clear     Mixed density mass in the right parietal occipital lobe with extensive surrounding white matter edema and minimal parenchymal and subarachnoid hemorrhage. There is midline shift 8 mm left to right. Compression of the right lateral ventricle. Minimal mass effect on the basilar cistern. This was called to Dr. Gray Palacio at time of exam. **This report has been created using voice recognition software. It may contain minor errors which are inherent in voice recognition technology. ** Final report electronically signed by Dr. Handy Fournier on 6/28/2021 9:56 AM    CT FACIAL BONES WO CONTRAST    Result Date: 6/28/2021  PROCEDURE: CT FACIAL BONES WO CONTRAST CLINICAL INFORMATION: tr. Fall with right-sided pain and bruising. COMPARISON: CT head dated 9/8/2019 and CT facial bones dated 8/20/2017. TECHNIQUE: Helical CT of the face with sagittal and coronal reconstructions. All CT scans at this facility use dose modulation, iterative reconstruction, and/or weight-based dosing when appropriate to reduce radiation dose to as low as reasonably achievable. FINDINGS:  Maxillofacial bones are normally aligned without visualized fracture. The mandible appears intact. Patient is edentulous. There is rightward deviation of the nasal septum, stable compared to prior exams. There is a right-sided globe prosthesis, stable compared to prior exam. Paranasal sinuses are clear. Mastoid air cells and middle ears are clear. No evidence of acute osseous injury of the face. **This report has been created using voice recognition software. It may contain minor errors which are inherent in voice recognition technology. ** Final report electronically signed by Dr. Frank Jackson MD on 6/28/2021 10:04 AM    CT CHEST W CONTRAST    Result Date: 6/28/2021  PROCEDURE: CT CHEST W CONTRAST CLINICAL INFORMATION: tr COMPARISON: 8/26/2016 TECHNIQUE:  Axial CT images were obtained through the chest following the intravenous and demonstration of 80 mL Isovue 370 contrast. Coronal and sagittal reformatted images were rendered. All CT scans at this facility use dose modulation, iterative reconstruction, and/or weight-based dosing when appropriate to reduce radiation dose to as low as reasonably achievable. FINDINGS: The central airways appear patent. There is a 3 mm pulmonary nodule within the left lower lobe demonstrated on axial image 58. This appears unchanged from 8/26/2016 and likely represents a poorly calcified granuloma given its stability. No pneumothorax or pleural effusion is seen. No mediastinal hematoma is demonstrated.  CT of the abdomen and pelvis are reported separately. No acute osseous findings are demonstrated. 1. No acute traumatic intrathoracic findings. **This report has been created using voice recognition software. It may contain minor errors which are inherent in voice recognition technology. ** Final report electronically signed by Dr. Huy Kaur on 6/28/2021 10:00 AM    CT CERVICAL SPINE WO CONTRAST    Result Date: 6/28/2021  PROCEDURE: CT CERVICAL SPINE WO CONTRAST CLINICAL INFORMATION: tr. Right-sided neck pain and bruising after fall. COMPARISON: CT cervical spine dated 9/8/2019. TECHNIQUE: 3 mm noncontrast axial images were obtained through the cervical spine with sagittal and coronal reconstructions. All CT scans at this facility use dose modulation, iterative reconstruction, and/or weight-based dosing when appropriate to reduce radiation dose to as low as reasonably achievable. FINDINGS: There is a dextrocurvature of the cervical spine which is likely positional.  There is grade 1 anterolisthesis of C7 relative to T1 on the basis of degenerative change. There is stable mild irregularity of the superior endplate of C7 likely associated with Schmorl's node. There is otherwise anatomic vertebral body height and alignment. No fracture of the cervical vertebral column is identified. The craniocervical junction appears intact. No paraspinal or epidural fluid collection is identified. Paraspinal soft tissues are grossly unremarkable. There are stable mild degenerative changes of the cervical spine. No evidence of acute osseous injury of the cervical spine. **This report has been created using voice recognition software. It may contain minor errors which are inherent in voice recognition technology. ** Final report electronically signed by Dr. Yuriy Gardiner MD on 6/28/2021 9:54 AM    MRI BRAIN W CONTRAST    Result Date: 7/1/2021  PROCEDURE: MRI BRAIN W CONTRAST CLINICAL INFORMATION: [place] protocol .  COMPARISON: MR brain dated 6/28/2021. TECHNIQUE: Axial 3-D T1 weighted [place] protocol images were obtained through the brain before and after the administration of 10 mL ProHance injected in the right forearm. Fiducial markers are in place. Sagittal and coronal reconstructions were created. FINDINGS: Redemonstration of a multilobulated T1 hypointense mass centered at the temporal occipital junction on the right with prominent nodular and irregular curvilinear peripheral enhancement along with thickened enhancing septations measuring 5.5 x 3.8 cm in greatest axial dimensions, not significantly changed compared to prior MRI. There is prominent adjacent hypointense T1 signal. Redemonstration of prominent mass effect on the atrium and occipital horn of the right lateral ventricle with approximately 1 cm leftward midline shift, stable compared to prior exam.      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 **This report has been created using voice recognition software. It may contain minor errors which are inherent in voice recognition technology. ** Final report electronically signed by Dr. Mary Lou Fitzpatrick MD on 7/1/2021 10:12 AM    CT ABDOMEN PELVIS W IV CONTRAST Additional Contrast? None    Result Date: 6/28/2021  PROCEDURE: CT ABDOMEN PELVIS W IV CONTRAST CLINICAL INFORMATION: tr . COMPARISON: 8/12/2020 TECHNIQUE: 2-D multiplanar postcontrast images of the abdomen and pelvis Isovue-370 IV contrast. All CT scans at this facility use dose modulation, iterative reconstruction, and/or weight-based dosing when appropriate to reduce radiation dose to as low as reasonably achievable. FINDINGS: No solid organ injury. Liver and spleen are within normal limits. There may be early arterial enhancement causing heterogeneous enhancement of the spleen.  Appearance of the liver also suggests early arterial enhancement. Pancreas is unremarkable. Adrenals are normal. Stable cyst lower pole left kidney. Kidneys enhance symmetrically and appear otherwise normal. Heavy atherosclerosis of the aorta. Right common iliac artery stent. No adenopathy. Left anterior abdominal hernia containing colon and small bowel. No obstruction at this time. Postsurgical changes right colon near the cecum. Urinary bladder is distended. Uterus is present. Postsurgical changes rectosigmoid colon. Bones 9 mm anterolisthesis L5 on S1. Degenerative disc disease. No acute or suspicious bone lesions identified. Remote healed fracture of the right superior and inferior pubic rami. Multiple chronic findings. No acute abdominal or pelvic abnormality. **This report has been created using voice recognition software. It may contain minor errors which are inherent in voice recognition technology. ** Final report electronically signed by Dr. Gordo Ladd on 6/28/2021 10:03 AM    US LIVER    Result Date: 6/29/2021  Ultrasound of the right upper quadrant Comparison:  US,SR  - US LIVER  - 05/26/2020 10:50 AM EDT Findings: Normal liver. No evidence for mass. Liver measures 14.4 cm. Status post cholecystectomy. Normal common bile duct at 5 mm. Normal visualized portion of the pancreas. Mild right hydronephrosis. No renal mass. Impression: Mild right hydronephrosis. This document has been electronically signed by: Sunday Landau, MD on 06/29/2021 01:28 AM    XR CHEST 1 VIEW    Result Date: 7/1/2021  PROCEDURE: XR CHEST 1 VIEW CLINICAL INFORMATION: preop. COMPARISON: Chest x-ray dated 4 May 2021. TECHNIQUE: AP upright view of the chest. FINDINGS: There is borderline cardiomegaly. There is atherosclerotic calcification in the aortic arch. There is a stent at the origin of the left subclavian artery. There is slightly increased density in the right paratracheal space. There are no pulmonary infiltrates or effusions.  The pulmonary vascularity is normal. There are degenerative changes involving the acromioclavicular joints bilaterally and glenohumeral joints. .     1. No interval change since previous study dated for May 2021, no acute cardiopulmonary disease. **This report has been created using voice recognition software. It may contain minor errors which are inherent in voice recognition technology. ** Final report electronically signed by DR Caio Mcneal on 7/1/2021 10:24 AM    CT LUMBAR RECONSTRUCTION WO POST PROCESS    Result Date: 6/28/2021  PROCEDURE: CT LUMBAR RECONSTRUCTION WO POST PROCESS CLINICAL INFORMATION: fall. Right-sided pain and bruising. COMPARISON: Cervical spine radiographs dated 3/16/2021. TECHNIQUE: 3 mm axial, sagittal and coronal CT images were reconstructed through the lumbar spine. These are reformatted from the patient's abdomen and pelvis CT. IV contrast is present. All CT scans at this facility use dose modulation, iterative reconstruction, and/or weight-based dosing when appropriate to reduce radiation dose to as low as reasonably achievable. FINDINGS: There are chronic bilateral pars defects at L5 with grade 2 anterolisthesis of L5 relative to S1, stable compared to prior radiographs. There is grade 1 retrolisthesis of L1 relative to L2 on the basis of degenerative change, also stable compared to prior radiographs. There is a minimal levocurvature of the lumbar spine, unchanged compared to prior contrast. There is otherwise anatomic vertebral body height and alignment. No acute fracture of the lumbar vertebral column is identified. At T12-L1 there is a minimal disc bulge without significant spinal canal or neuroforaminal stenosis. At L1-2 there is partial uncovering the disc without significant spinal canal stenosis and mild left and mild-to-moderate right neural foraminal stenosis in association with facet hypertrophy.  At L2-3 there is a shallow disc bulge which contributes to mild spinal canal stenosis and mild left and moderate right neuroforaminal stenosis in association with facet hypertrophy. At L3-4 there is a shallow disc bulge without significant spinal canal stenosis. There is mild bilateral foraminal stenosis in association with facet hypertrophy. At L4-5 there is no significant spinal canal stenosis. There is mild to moderate bilateral foraminal stenosis in association with shallow disc bulge and facet hypertrophy. At L5-S1 there is partial uncovering of the disc. There is moderate to severe bilateral neural foraminal stenosis from flattening of the neural foramina and uncovering of the disc associated with the anterolisthesis. 1. No evidence of acute osseous injury of the lumbar spine. 2. 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 3. Multilevel degenerative changes elsewhere. Please refer to the body of the report for segmental analysis. **This report has been created using voice recognition software. It may contain minor errors which are inherent in voice recognition technology. ** Final report electronically signed by Dr. Roseanne Zeng MD on 6/28/2021 10:00 AM    CT THORACIC RECONSTRUCTION WO POST PROCESS    Result Date: 6/28/2021  PROCEDURE: CT THORACIC RECONSTRUCTION WO POST PROCESS CLINICAL INFORMATION: Trauma . COMPARISON: No prior exam TECHNIQUE: 2-D multiplanar reconstructed images of the thoracic spine. All CT scans at this facility use dose modulation, iterative reconstruction, and/or weight-based dosing when appropriate to reduce radiation dose to as low as reasonably achievable. FINDINGS: Increased thoracic kyphosis. No acute fracture or acute bony malalignment. Diffuse osteopenia. Diffuse degenerative disc disease. No central canal encroachment. Dextroscoliosis. No foraminal encroachment or paraspinal soft tissue abnormality. No acute abnormality of the thoracic spine.  **This report has been created using voice recognition software. It may contain minor errors which are inherent in voice recognition technology. ** Final report electronically signed by Dr. Slade Elliott on 6/28/2021 10:06 AM    MRI BRAIN W WO CONTRAST    Result Date: 6/28/2021  PROCEDURE: MRI BRAIN W WO CONTRAST INDICATION:brain mass. Fall today. COMPARISON: CT head from the same date and MR brain dated 10/23/2009. TECHNIQUE: Multiplanar and multiple spin echo T1 and T2-weighted images were obtained through the brain before and after the administration of intravenous contrast. 15 mL ProHance was injected in the right AC. FINDINGS: There is an intra-axial multilobulated mass centered at the right occipital/temporal lobe junction measuring 5.5 x 3.9 cm in greatest axial dimensions. There are lobulated low T1, hyperintense T2 signal components centrally. There is thickened and nodular irregular peripheral enhancement following contrast administration. There are nodular enhancing septations centrally and inferiorly within the lesion. No other enhancing lesions are identified within the parenchyma. There is prominent T2/FLAIR prolongation adjacent to the lesion extending anteriorly into the temporal, parietal and frontal lobes and in the right basal ganglia, as well as the splenium of the corpus callosum. There is prominent mass effect on the occipital horn of the right lateral ventricle with near complete effacement. There is 9 millimeters leftward midline shift at the level of the foramen of [place]. There are moderate confluent and patchy areas of T2/FLAIR prolongation in the periventricular, subcortical deep white matter elsewhere. No focal areas of restricted diffusion are present. The major vascular flow voids appear patent. There is artifact from patient's right ocular implant. Patient is status post lens extraction on the left. Orbits are otherwise unremarkable. Paranasal sinuses and mastoid air cells are clear.       Heterogeneous 5.5 cm nodular peripherally enhancing cystic and solid mass centered at the right occipital/temporal lobe junction with prominent adjacent T2/FLAIR prolongation involving the corpus callosum, temporal, parietal and frontal lobes with prominent mass effect on the right lateral ventricle and 9 mm leftward midline shift. 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 **This report has been created using voice recognition software. It may contain minor errors which are inherent in voice recognition technology. ** Final report electronically signed by Dr. Zaheer Diaz MD on 6/28/2021 12:45 PM    A/P: S/P patient is seen and evaluated in the ICU setting with evaluation exam findings reviewed and discussed with Dr. James Mann and with nursing and critical care. Patient is postoperative day #1 from right craniotomy and resection of intercranial mass performed with Dr. James Mann, without complication. Incisions are flat and dry with dressings intact and the drain is intact and functioning at 65 cc. Patient has some complaints of mild incisional site discomfort and nausea. She is otherwise stable and intact her baseline with no additional changes noted the patient chart overnight. Decadron administration has been modified to 3 mg q. 8 with MRI pending review. Pathology pending. Neurosurgery recommends some lightening of the room today with complete lightening for tomorrow and up to a chair as tolerated.   Neurosurgery to follow    Electronically signed by Kevin Vargas PA-C on 7/3/2021 at 11:28 AM

## 2021-07-03 NOTE — PLAN OF CARE
Problem: Pain:  Goal: Pain level will decrease  Description: Pain level will decrease  Outcome: Ongoing  Note: Morphine q2 for pain      Problem: Skin Integrity:  Goal: Will show no infection signs and symptoms  Description: Will show no infection signs and symptoms  Outcome: Ongoing  Note: No S&S of infection   Goal: Absence of new skin breakdown  Description: Absence of new skin breakdown  Outcome: Ongoing  Note: Q2 turns. Mepilex to coccyx. Stage 1 to right buttock      Problem: Falls - Risk of:  Goal: Will remain free from falls  Description: Will remain free from falls  Outcome: Ongoing  Note: Fall precautions in place. Fall bracelet, nonskid socks, fall door magnet, bed alarm on, and call light in reach. Problem: Discharge Planning:  Goal: Discharged to appropriate level of care  Description: Discharged to appropriate level of care  Outcome: Ongoing  Note: Pt remains in ICU after surgery      Problem: Metabolic:  Goal: Ability to maintain appropriate glucose levels will improve  Description: Ability to maintain appropriate glucose levels will improve  Outcome: Ongoing  Note: Insulin check 154 x1 unit     Care plan reviewed with patient.  Will review and discuss care plan with family when available

## 2021-07-04 ENCOUNTER — APPOINTMENT (OUTPATIENT)
Dept: MRI IMAGING | Age: 86
DRG: 025 | End: 2021-07-04
Payer: MEDICARE

## 2021-07-04 LAB
ERYTHROCYTE [DISTWIDTH] IN BLOOD BY AUTOMATED COUNT: 14.4 % (ref 11.5–14.5)
ERYTHROCYTE [DISTWIDTH] IN BLOOD BY AUTOMATED COUNT: 52.3 FL (ref 35–45)
GLUCOSE BLD-MCNC: 127 MG/DL (ref 70–108)
GLUCOSE BLD-MCNC: 130 MG/DL (ref 70–108)
GLUCOSE BLD-MCNC: 230 MG/DL (ref 70–108)
HCT VFR BLD CALC: 30.9 % (ref 37–47)
HCT VFR BLD CALC: 33.1 % (ref 37–47)
HEMOGLOBIN: 10 GM/DL (ref 12–16)
HEMOGLOBIN: 9.2 GM/DL (ref 12–16)
MCH RBC QN AUTO: 29.8 PG (ref 26–33)
MCHC RBC AUTO-ENTMCNC: 29.8 GM/DL (ref 32.2–35.5)
MCV RBC AUTO: 100 FL (ref 81–99)
PLATELET # BLD: 101 THOU/MM3 (ref 130–400)
PMV BLD AUTO: 11.7 FL (ref 9.4–12.4)
RBC # BLD: 3.09 MILL/MM3 (ref 4.2–5.4)
URINE CULTURE, ROUTINE: NORMAL
WBC # BLD: 8.5 THOU/MM3 (ref 4.8–10.8)

## 2021-07-04 PROCEDURE — 6360000002 HC RX W HCPCS: Performed by: PHYSICIAN ASSISTANT

## 2021-07-04 PROCEDURE — 2580000003 HC RX 258: Performed by: PHYSICIAN ASSISTANT

## 2021-07-04 PROCEDURE — 82948 REAGENT STRIP/BLOOD GLUCOSE: CPT

## 2021-07-04 PROCEDURE — 85018 HEMOGLOBIN: CPT

## 2021-07-04 PROCEDURE — 6360000004 HC RX CONTRAST MEDICATION: Performed by: PHYSICIAN ASSISTANT

## 2021-07-04 PROCEDURE — 97168 OT RE-EVAL EST PLAN CARE: CPT

## 2021-07-04 PROCEDURE — 85014 HEMATOCRIT: CPT

## 2021-07-04 PROCEDURE — 99233 SBSQ HOSP IP/OBS HIGH 50: CPT | Performed by: INTERNAL MEDICINE

## 2021-07-04 PROCEDURE — 85027 COMPLETE CBC AUTOMATED: CPT

## 2021-07-04 PROCEDURE — 2000000000 HC ICU R&B

## 2021-07-04 PROCEDURE — 6360000002 HC RX W HCPCS: Performed by: NURSE PRACTITIONER

## 2021-07-04 PROCEDURE — APPSS30 APP SPLIT SHARED TIME 16-30 MINUTES: Performed by: PHYSICIAN ASSISTANT

## 2021-07-04 PROCEDURE — 6370000000 HC RX 637 (ALT 250 FOR IP): Performed by: PHYSICIAN ASSISTANT

## 2021-07-04 PROCEDURE — 36415 COLL VENOUS BLD VENIPUNCTURE: CPT

## 2021-07-04 PROCEDURE — 6360000002 HC RX W HCPCS: Performed by: NEUROLOGICAL SURGERY

## 2021-07-04 PROCEDURE — 99024 POSTOP FOLLOW-UP VISIT: CPT | Performed by: NEUROLOGICAL SURGERY

## 2021-07-04 PROCEDURE — 70553 MRI BRAIN STEM W/O & W/DYE: CPT

## 2021-07-04 PROCEDURE — A9579 GAD-BASE MR CONTRAST NOS,1ML: HCPCS | Performed by: PHYSICIAN ASSISTANT

## 2021-07-04 PROCEDURE — 97530 THERAPEUTIC ACTIVITIES: CPT

## 2021-07-04 RX ADMIN — HYDROCODONE BITARTRATE AND ACETAMINOPHEN 1 TABLET: 5; 325 TABLET ORAL at 14:04

## 2021-07-04 RX ADMIN — CEFAZOLIN 2000 MG: 10 INJECTION, POWDER, FOR SOLUTION INTRAVENOUS at 08:30

## 2021-07-04 RX ADMIN — DEXAMETHASONE SODIUM PHOSPHATE 3 MG: 4 INJECTION, SOLUTION INTRA-ARTICULAR; INTRALESIONAL; INTRAMUSCULAR; INTRAVENOUS; SOFT TISSUE at 05:59

## 2021-07-04 RX ADMIN — SERTRALINE 50 MG: 50 TABLET, FILM COATED ORAL at 08:57

## 2021-07-04 RX ADMIN — LEVETIRACETAM 500 MG: 100 INJECTION INTRAVENOUS at 20:42

## 2021-07-04 RX ADMIN — SODIUM CHLORIDE, PRESERVATIVE FREE 10 ML: 5 INJECTION INTRAVENOUS at 08:57

## 2021-07-04 RX ADMIN — GADOTERIDOL 10 ML: 279.3 INJECTION, SOLUTION INTRAVENOUS at 13:14

## 2021-07-04 RX ADMIN — CEFAZOLIN 2000 MG: 10 INJECTION, POWDER, FOR SOLUTION INTRAVENOUS at 17:18

## 2021-07-04 RX ADMIN — FENTANYL CITRATE 25 MCG: 50 INJECTION, SOLUTION INTRAMUSCULAR; INTRAVENOUS at 06:02

## 2021-07-04 RX ADMIN — DEXAMETHASONE SODIUM PHOSPHATE 3 MG: 4 INJECTION, SOLUTION INTRA-ARTICULAR; INTRALESIONAL; INTRAMUSCULAR; INTRAVENOUS; SOFT TISSUE at 14:09

## 2021-07-04 RX ADMIN — ATORVASTATIN CALCIUM 10 MG: 10 TABLET, FILM COATED ORAL at 20:36

## 2021-07-04 RX ADMIN — DOCUSATE SODIUM 100 MG: 100 CAPSULE, LIQUID FILLED ORAL at 08:57

## 2021-07-04 RX ADMIN — SERTRALINE 50 MG: 50 TABLET, FILM COATED ORAL at 20:36

## 2021-07-04 RX ADMIN — PANTOPRAZOLE SODIUM 40 MG: 40 TABLET, DELAYED RELEASE ORAL at 06:05

## 2021-07-04 RX ADMIN — SODIUM CHLORIDE, PRESERVATIVE FREE 10 ML: 5 INJECTION INTRAVENOUS at 20:37

## 2021-07-04 RX ADMIN — DEXAMETHASONE SODIUM PHOSPHATE 3 MG: 4 INJECTION, SOLUTION INTRA-ARTICULAR; INTRALESIONAL; INTRAMUSCULAR; INTRAVENOUS; SOFT TISSUE at 23:32

## 2021-07-04 RX ADMIN — LEVOTHYROXINE SODIUM 100 MCG: 0.1 TABLET ORAL at 06:05

## 2021-07-04 RX ADMIN — ISOSORBIDE MONONITRATE 30 MG: 30 TABLET ORAL at 08:57

## 2021-07-04 RX ADMIN — POLYETHYLENE GLYCOL (3350) 17 G: 17 POWDER, FOR SOLUTION ORAL at 08:57

## 2021-07-04 RX ADMIN — DEXAMETHASONE SODIUM PHOSPHATE 3 MG: 4 INJECTION, SOLUTION INTRA-ARTICULAR; INTRALESIONAL; INTRAMUSCULAR; INTRAVENOUS; SOFT TISSUE at 17:18

## 2021-07-04 RX ADMIN — HYDROCODONE BITARTRATE AND ACETAMINOPHEN 1 TABLET: 5; 325 TABLET ORAL at 23:32

## 2021-07-04 RX ADMIN — LEVETIRACETAM 500 MG: 100 INJECTION INTRAVENOUS at 08:59

## 2021-07-04 RX ADMIN — HYDROCODONE BITARTRATE AND ACETAMINOPHEN 1 TABLET: 5; 325 TABLET ORAL at 02:58

## 2021-07-04 ASSESSMENT — ENCOUNTER SYMPTOMS
COUGH: 0
VOICE CHANGE: 0
EYE REDNESS: 0
CHEST TIGHTNESS: 0
BACK PAIN: 0
SHORTNESS OF BREATH: 0
NAUSEA: 0
TROUBLE SWALLOWING: 0
COLOR CHANGE: 0
VOMITING: 0
ABDOMINAL DISTENTION: 0
ABDOMINAL PAIN: 0

## 2021-07-04 ASSESSMENT — PAIN DESCRIPTION - ONSET
ONSET: ON-GOING
ONSET: AWAKENED FROM SLEEP

## 2021-07-04 ASSESSMENT — PAIN SCALES - GENERAL
PAINLEVEL_OUTOF10: 2
PAINLEVEL_OUTOF10: 0
PAINLEVEL_OUTOF10: 7
PAINLEVEL_OUTOF10: 6
PAINLEVEL_OUTOF10: 10
PAINLEVEL_OUTOF10: 7
PAINLEVEL_OUTOF10: 2

## 2021-07-04 ASSESSMENT — PAIN DESCRIPTION - PAIN TYPE
TYPE: ACUTE PAIN;SURGICAL PAIN

## 2021-07-04 ASSESSMENT — PAIN DESCRIPTION - LOCATION
LOCATION: HEAD
LOCATION: HEAD

## 2021-07-04 ASSESSMENT — PAIN DESCRIPTION - ORIENTATION
ORIENTATION: RIGHT
ORIENTATION: RIGHT

## 2021-07-04 ASSESSMENT — PAIN DESCRIPTION - PROGRESSION
CLINICAL_PROGRESSION: GRADUALLY IMPROVING
CLINICAL_PROGRESSION: GRADUALLY WORSENING

## 2021-07-04 ASSESSMENT — PAIN DESCRIPTION - DESCRIPTORS
DESCRIPTORS: PRESSURE
DESCRIPTORS: PRESSURE

## 2021-07-04 ASSESSMENT — PAIN DESCRIPTION - FREQUENCY
FREQUENCY: CONTINUOUS
FREQUENCY: CONTINUOUS

## 2021-07-04 ASSESSMENT — PAIN - FUNCTIONAL ASSESSMENT: PAIN_FUNCTIONAL_ASSESSMENT: PREVENTS OR INTERFERES WITH MANY ACTIVE NOT PASSIVE ACTIVITIES

## 2021-07-04 NOTE — PROGRESS NOTES
1500 this RN assessing pt for afternoon assessment and pt complaining of severe pain/pressure in her head, coughing and stated \"help me help me some thing is not right\". Pt alert and oriented. PRN medications administered, reassessment of pain pt still complaining of \"severe pressure and pain\" with new onset numbness/tinginling of the all extremities and vision loss, unable to see out left eye. PRRL. Modifies NIH completed pt unable to identify/read stroke pictures or sentences and scored a 2 on limp ataxia. [place] notified    Dominga [place] at bedside evaluating. Dr. Jim [place] not too concern as pt was able to identify time on clock appropriately. Stated, \"vision changes may come and go\" and believes unable to see due to darkness of room. Pt able to allow direct light upon skin on 7/4/2021  0700.

## 2021-07-04 NOTE — PROGRESS NOTES
Patient:  Sidra Childers    Unit/Bed:4D-03/003-A  MRN: [id number]   PCP: Sophia Salter MD  Date of Admission: 6/28/2021    Assessment and Plan(All pulmonary edema, renal failure, PE, and respiratory failure diagnoses are acute in nature unless otherwise specified):        1. Right occipital/parietal brain mass: Concerning for glioma. POD #1 right craniotomy and resection of brain mass. Pathology pending. Monitor drain output. S/p mannitol. Decadron 3 mg q6h per neurosurgery. Neurosurgery following. CT head yesterday was stable; planning for repeat MRI today 7/4. S/p Gleolan; ok to re-lighten patient's room today 7/4 (48 hours post-administration). Continue neuro checks. Zofran as needed for nausea. OK for PT/OT up to chair per neurosurgery. On ancef for perioperative prophylaxis. On keppra for seizure prophylaxis. Weaned off nicardipine. Keep -160.   2. Acute subarachnoid hemorrhage: Traumatic versus hemorrhagic brain mass. Holding home aspirin, plavix. Status post TXA given on arrival.  TEG was WNL. INR and APTT WNL. Repeat CT head today was stable. Neurosurgery following. Keep 160. Off nicardipine. DVT prophylaxis pending neurosurgery approval.  3. HTN: Controlled off nicardipine drip. Home Imdur continued. As needed hydralazine. Keep 160.  4. Fall: Multiple recurrences over the last couple of months. Likely secondary to brain mass with intracranial hemorrhage as noted above. PT/OT to evaluate as above. Continue fall precautions. 5. Normocytic anemia: Hemoglobin 9.2, from 10.6 yesterday. No active signs of bleeding. Monitor intracranial drain output; about 105 mL last 24h. Repeat H/H this afternoon then monitor CBC daily. 6. Temporal arteritis: On steroids (decadron) as above. Uses 3 mg prednisone daily at home. History of prosthetic right eye. 7. Breast cancer, history of: Ultrasound shows no metastatic process to the liver.  No suspicious lesion identified on CT scan abdomen and pelvis. 8. Hypothyroidism, history of:  Continue synthroid. 9. GERD, history of: continue protonix. 10. HLD, history of: continue lipitor. 11. Acute hyperglycemia (resolved): Likely was secondary to trauma, steroid use. No documented history of diabetes. On sliding scale insulin low-dose as needed. Continue to monitor. 12. Leukocytosis (resolved): Likely secondary to surgery, steroid use. Low-grade fever Tmax 100.0 last 24h. Continue to monitor for s/s of infection. CBC daily. On Ancef as above for surgical prophylaxis. CC:  Brain mass  HPI: Matthew Haines is a 79 y/o female former-smoker with PMH of temporal arteritis with right-sided blindness, right eye removed in 2017, cholecystectomy in 2003, remote partial hysterectomy, hypothyroidism, GERD, degenerative arthritis, left subclavian artery stenosis, left vertebral artery occlusion, breast cancer (diagnosed in 2016) with isolated liver lesion, recurrent falls.      Patient presented to [facility] ED on 6/28/21 after sustaining a fall trying to ambulate to the bathroom. Per report, she had stated that her legs gave out when she was trying to ambulate and she fell to the ground. She had endorsed hitting her head on the nightstand and injuring her right eye. She denied loss of consciousness or any seizures. She was too weak to bear her own weight. Her home health care provider arrived at her home and found her on the floor. She was transferred to the emergency room. Work-up revealed an abnormal CT scan of the head, followed by MRI of the head. The report was consistent with a right-sided brain mass, consistent with glioblastoma. They also noted a small subarachnoid hemorrhage, so she was administered TXA. Neurosurgery was consulted. Patient was admitted to ICU for close monitoring.   On Friday, 7/2/2021, patient underwent craniotomy for resection/debulking of the brain mass.     For further details, please see assessment and Appears stated age. HEENT:  normocephalic and atraumatic. No scleral icterus. PERR. Neck: supple. No Thyromegaly. Lungs: clear to auscultation. No retractions  Cardiac: RRR. No JVD. Abdomen: soft. Nontender. Extremities:  No clubbing, cyanosis, or edema x 4. Vasculature: capillary refill < 3 seconds. Palpable dorsalis pedis pulses. Skin:  warm and dry. Psych:  Alert and oriented x3. Affect appropriate  Lymph:  No supraclavicular adenopathy. Neurologic:  No focal deficit. No seizures. Data: (All radiographs, tracings, PFTs, and imaging are personally viewed and interpreted unless otherwise noted).  Telemetry: NSR. Rate 68. No ectopy.  Sodium 138 potassium 4.2 chloride 104 CO2 24 BUN 13 creatinine 0.6 glucose 127 calcium 8.1   Serum osmolality 297   WBC 8.5 hemoglobin 9.2 hematocrit 30.9 platelets 102   Urine culture 7/2: preliminary no growth   CT head wo contrast 7/3/21 report: Mild interval decrease in the previously noted pneumocephalus compared to the prior study. Postoperative changes are again noted. Small amount of subarachnoid blood in association with the right parietal lobe is stable. Stable white matter edema involving portions of the right cerebral hemisphere.  There is stable approximately 5-6 mm of right-to-left midline shift. Case discussed with Dr. Soraida Garcia.     Electronically signed by PAMELA Field-DELON

## 2021-07-04 NOTE — PROGRESS NOTES
[facility] ICU 4D  Occupational Therapy  Reassessment  Time:   Time In: 1435  Time Out: 1505  Timed Code Treatment Minutes: 30 Minutes  Minutes: 30          Date: 2021  Patient Name: Barrett Tong,   Gender: female      Room: [facility]003-A  MRN: [id number]  : 1933  (80 y.o.)  Referring Practitioner: PAMELA Franklin CNP  Diagnosis: Fall at Home  Additional Pertinent Hx: Barrett Tong is a 80 y.o. female who presents to the emergency department for evaluation of fall. Patient has had multiple falls over the past couple weeks, refused EMS transfer each time. Today patient had a fall, striking her right temple. Patient is on Plavix. 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 Only pain that she is complaining of is to her right temple. Per MRI: Heterogeneous 5.5 cm nodular peripherally enhancing cystic and solid mass centered at the right occipital/temporal lobe junction with prominent adjacent T2/FLAIR prolongation involving the corpus callosum, temporal, parietal and frontal lobes with prominent mass effect on the right lateral ventricle and 9 mm leftward midline shift. (High grade glioma, glioblastoma multiform). Pt is S/P R craniotomy on 21. Restrictions/Precautions:  Restrictions/Precautions: General Precautions, Fall Risk  Position Activity Restriction  Other position/activity restrictions: L neglect, R artificial eye; 3L of O2 on      SUBJECTIVE: Pleasant and cooperative  Pt was sitting in the chair. She agreed to return to the bed because she had been sitting for 2 hours. PAIN: Pt denies pain. She has soreness generally. Vitals: Nurse checked vitals prior to session    COGNITION: Slow Processing, Inattention and Decreased Problem Solving    ADL:   Pt used a tissue to wipe her nose with setup A. BALANCE:  Sitting Balance:  Contact Guard Assistance.  sitting at the edge of the chair or the edge of bed while getting repositioned  Standing Balance: Minimal Assistance. preparing to stand    BED MOBILITY:  Sit to Supine: Minimal Assistance help provided to bring her legs into the bed  Scooting: Minimal Assistance bringing her into the middle of the bed and also bringing her shoulders into the middle of the bed    TRANSFERS:  Sit to Stand:  [address] from the recliner chair with cues to get straight after standing up  Stand to Sit: Minimal Assistance. to the edge of the bed     FUNCTIONAL MOBILITY:  Assistive Device: Rolling Walker  Assist Level:  Minimal Assistance. Distance: 4 ft from chair to the bed  Help needed for placement of her L hand on the  of the walker. Pt also needed help to steer the walker and cues to step back until touching the bed before starting to sit. ADDITIONAL ACTIVITIES:   Visual perception:   Pt had difficulty with reaching to  items or replace them on the bedside table. Poor depth perception. R eye blindness noted. Verbal cues needed to attend to items on the L side of a page. She held on the page while reading from it with extra time needed for the month, year and day of week. ASSESSMENT:   Pt is S/P R craniotomy on 7/2/21. She was showing symetricle strength in B arms/legs. Pt has difficulty with attending to task. She also has very poor visual perception including depth perception, attending to her L visual field. She has no vision in her R eye which has been her baseline. She needed MIN A for bed mobility and CGA for sit to stand. MIN A needed for safely sitting on he edge of bed and MIN A for steering the walker. She could feed herself but needed cues to orient to placement of the items on the tray. Activity Tolerance:  Patient tolerance of  treatment: fair. Pt was tolerating session well. She had been sitting up for 2 hours. She was using 3L of O2 and her O2 saturation remained in the mid 90s%.       Discharge Recommendations: Continue to assess pending progress, IP Rehab, Patient would benefit from continued therapy after discharge    Equipment Recommendations: Other: continue to assess needs  Plan: Times per week: 6x  Current Treatment Recommendations: Strengthening, Balance Training, Neuromuscular Re-education, Functional Mobility Training, Endurance Training, Home Management Training, Patient/Caregiver Education & Training, Self-Care / ADL, Safety Education & Training    Patient Education  Patient Education: Role of OT, Plan of Care and Low Vision    Goals  Short term goals  Time Frame for Short term goals: by discharge  Short term goal 1: Pt will complete visual scanning to L side with min cues during ADLs to decrease fall risk during ADLs. Not met. Cont. Goal.   Short term goal 2: Pt will complete LB ADL with Morales and adaptive techs to increase indep with self care. Not met. Cont. Goal.   Short term goal 3: Pt will complete dynamic standing task with B hand release and SBA x 4 min to increase balance required for ADL completion. Not met. Cont. Goal.   Short term goal 4: Pt will complete functional mobility to/from BR with AD and SBA to increase indep with self care. Not met. Cont. Goal.     Following session, patient left in safe position with all fall risk precautions in place.

## 2021-07-04 NOTE — PROGRESS NOTES
[facility] PHYSICAL THERAPY  PROGRESS NOTE  [facility] ICU 4D - 4D-03/003-A    Time In: 4226  Time Out: 1332  Timed Code Treatment Minutes: 25 Minutes  Minutes: 25          Date: 2021  Patient Name: Yaya Tony,  Gender:  female        MRN: [id number]  : 1933  (80 y.o.)     Referring Practitioner: PAMELA Baker CNP  Diagnosis: Fall at home  Additional Pertinent Hx: Alisa Lawson is an 80year old female presenting to the Emergency Department via EMS for evaluation of potential injuries sustained when she fell in her home this morning around 5 am.  She reports that she was going back to her bed from her bathroom when her legs became weak and she fell. She is not sure if she hit her head on the nightstand but she states that the lamp that was on the nightstand fell on top of her, hitting the right side of her forehead/eye. She denies loss of consciousness. She was found by her home health aide around 0800 as she was not able to get herself up after falling. EMS reports that they have been to [facility] multiple times over the last few weeks for lift assist but she refuses to go to the hospital for evaluation. Alisa Lawson reports that her legs have been progressively becoming more weak. She denies headache, neck pain, chest pain, abdominal pain. She does admit to lower back pain and lower extremity weakness. No visual changes, lightheadedness or dizziness, nausea or vomiting. She denies any feelings of syncope prior to falling.      Prior Level of Function:  Lives With: Alone  Type of Home: House  Home Layout: One level  Home Access: Level entry  [facility]: [facility], 4 wheeled walker, Cane, Lift chair, Alert Button   Bathroom Shower/Tub: Tub/Shower unit, Shower chair with back  [employer]: Standard (with BSC over toilet)  Bathroom Equipment: Shower chair, 3-in-1 commode    Receives Help From: Personal care attendant  ADL Assistance: Needs assistance  Homemaking Assistance: Needs assistance  Homemaking Responsibilities: Yes  Ambulation Assistance: Independent  Transfer Assistance: Independent  Active : No  Additional Comments: 2 hours/day M-F has an aide that assists with ADLs/IADLs. nieces live nearby and able to help at times. Restrictions/Precautions:  Restrictions/Precautions: General Precautions, Fall Risk  Position Activity Restriction  Other position/activity restrictions: L neglect, R artificial eye     SUBJECTIVE:   Restart orders given s/p craniotomy for resection/debulking of Rt side intra brain tumor (7/2/2021). Pt resting in bed. Nursing stated she would like pt up in chair for lunch. Pt is pleasant and cooperative. PAIN: 0/10:     Vitals: Nurse checked vitals prior to session    OBJECTIVE:    Muscle Strength:  WFL BLEs  Bed Mobility:  Rolling to Right: Minimal Assistance   Supine to Sit: Minimal Assistance    Transfers:  Sit to Stand: Minimal Assistance, +1, CGA +1  Stand to Sit:Minimal Assistance    Ambulation:  Minimal Assistance +1, Assist of another to monitor lines  Distance: 4 ft to chair  Surface: Level Tile  Device:Rolling Walker  Gait Deviations:  Slow Gillian, Decreased Step Length Bilaterally and minimal trunk instability. Mod verbal cues needed for sequencing and aligning up to next seated surface. Balance:  Static Sitting Balance:  Contact Guard Assistance, initially, progressing to SBA seated edge of bed. Pt showing CGA to min assist to scoot hips forward at edge of bed. Static Standing Balance: Minimal Assistance, with RW support       Functional Outcome Measures: Completed  AM-PAC Inpatient Mobility Raw Score : 16  AM-PAC Inpatient T-Scale Score : 40.78    ASSESSMENT:  Assessment: Pt showing good m. Strength s/p craniotomy. Minimal deficits noted with motor planning with gait and alignment to seated surface. Pt also demonstrating decreased endurance and balance.   She will benefit from continued skilled PT to advance in these areas for improved functional mobility and safety. Activity Tolerance:  Patient tolerance of  treatment: good. Equipment Recommendations:Equipment Needed: No  Discharge Recommendations:  IP Rehab, Continue to assess pending progress    Plan: Times per week: 6x T  Current Treatment Recommendations: Strengthening, Transfer Training, ROM, Balance Training, Gait Training, Functional Mobility Training, Home Exercise Program    Patient Education  Patient Education: Bed Mobility, Transfers, Gait,  - Patient Verbalized Understanding, - Patient Requires Continued Education    Goals:  Patient goals : go home  Short term goals  Short term goal 1: Pt will supine<>sit with supervision in order to get out of bed  Short term goal 2: Pt will sit<>stand with SBA  and RW inorder to transfer surfaces  Short term goal 3: Pt will perform stand/pivot transfer with use of RW, CGA, aligning up to next seated surface with min verbal cues. Short term goal 4: Pt to walk with RW 40 ft, CGA to progress to home mobility  Long term goals  Time Frame for Long term goals : no goals set due to short ELOS    Following session, patient left in safe position with all fall risk precautions in place.

## 2021-07-04 NOTE — PROGRESS NOTES
Addendum by Dr. Blanco Joy MD:  I have seen and examined the patient independently. Face to face evaluation and examination was performed. The below evaluation and note have been reviewed. Labs and radiographs were reviewed. I Have discussed with Neurosurgery PA about this patient in detail. The below assessment and plan have been reviewed. Please see my modifications mentioned below. 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 ). · Sill complaining from some incisional pain and headache, as well as some visual issue otherwise she is doing well. · No new neurological deficit. · Postoperative brain CT showed the expected postoperative changes   · pain control. · Medical management per ICU team.  · Follow up the Post op  brain MRI today . · Neurosurgery will follow. Blanco Joy MD         Neurosurgery Progress Note    Patient:  Hero Elliott      Unit/Bed:4D-03/003-A    YOB: 1933    MRN: [id number]     Acct: [de-identified]     Admit date: 6/28/2021    Chief Complaint   Patient presents with    Trauma    Fall       Patient Seen, Chart, Physician notes, Labs, Radiology studies reviewed. Subjective: Patient is seen and evaluated in the ICU setting with evaluation exam findings reviewed and discussed with Dr. Desmond Barnett, with critical care and with nursing. She is postoperative day #2 following craniotomy and resection of intercranial mass performed by Dr. Desmond Barnett, without complication. Pain is well controlled today. Past, Family, Social History unchanged from admission. Diet:  ADULT DIET;  Regular    Medications:  Scheduled Meds:   dexamethasone  3 mg Intravenous Q6H    sodium chloride flush  5-40 mL Intravenous 2 times per day    ceFAZolin (ANCEF) IVPB  2,000 mg Intravenous Q8H    isosorbide mononitrate  30 mg Oral Daily    levothyroxine  100 mcg Oral Daily    pantoprazole  40 mg Oral QAM AC    sertraline  50 mg Oral BID    atorvastatin  10 mg Oral Nightly    polyethylene glycol  17 g Oral Daily    lidocaine  2 patch Topical Daily    levetiracetam  500 mg Intravenous Q12H    insulin lispro  0-6 Units Subcutaneous TID WC    insulin lispro  0-3 Units Subcutaneous Nightly    docusate sodium  100 mg Oral BID     Continuous Infusions:   sodium chloride      sodium chloride 75 mL/hr at 07/03/21 2357    niCARdipine      dextrose       PRN Meds:fentanNYL, sodium chloride flush, sodium chloride, ondansetron **OR** ondansetron, HYDROcodone 5 mg - acetaminophen, HYDROcodone 5 mg - acetaminophen, hydrALAZINE, ondansetron **OR** ondansetron, fleet, glucose, dextrose, glucagon (rDNA), dextrose    Objective: Patient is sitting up in bed with the head of the bed elevated 30 degrees and is comfortable with pain well-controlled. Incisions are flat and dry with dressings intact and her drain is intact and functioning at less than 100 cc per shift. She remained stable and intact to her baseline on exam with no additional changes noted the patient chart overnight. Of note: She is more active alert and interactive with clear appropriate speech and purposeful movement demonstrated for all extremity groups. Vitals: BP (!) 129/46   Pulse 68   Temp 99.5 °F (37.5 °C) (Bladder)   Resp 14   Ht 5' (1.524 m)   Wt 130 lb 11.7 oz (59.3 kg)   LMP  (LMP Unknown)   SpO2 (!) 87%   BMI 25.53 kg/m²   Physical Exam:  Alert and attentive. Language appropriate, with no aphasia. Pupils equal.  Facial strength symmetric. Physical Exam  Patient is resting comfortably and remained stable and grossly intact her baseline on exam with no additional changes noted to the patient chart overnight. ROS:  Review of Systems  Some incisional site discomfort is noted with patient denying headache, chest pain, shortness of breath, nausea or vomiting.     24 hour intake/output:    Intake/Output Summary (Last 24 hours) at 7/4/2021 1052  Last data filed at 7/4/2021 0612  Gross per 24 hour   Intake 1665 ml   Output 2655 ml   Net -990 ml     Last 3 weights: Wt Readings from Last 3 Encounters:   06/30/21 130 lb 11.7 oz (59.3 kg)   06/15/21 137 lb 4 oz (62.3 kg)   05/25/21 136 lb (61.7 kg)         CBC:   Recent Labs     07/02/21  0523 07/02/21  1414 07/03/21  0333 07/04/21  0637   WBC 8.1  --  15.7* 8.5   HGB 12.7 11.0* 10.6* 9.2*     --  135 101*     BMP:    Recent Labs     07/03/21  0333 07/03/21  1151 07/03/21  1939    142 138   K 4.0 3.7 4.2    107 104   CO2 24 24 24   BUN 16 14 13   CREATININE 0.8 0.7 0.6   GLUCOSE 141* 79 136*     Calcium:  Recent Labs     07/03/21  1939   CALCIUM 8.1*     Magnesium:No results for input(s): MG in the last 72 hours. Glucose:  Recent Labs     07/03/21  1754 07/03/21  2052 07/04/21  0836   POCGLU 141* 122* 127*     HgbA1C: No results for input(s): LABA1C in the last 72 hours. Lipids: No results for input(s): CHOL, TRIG, HDL, LDLCALC in the last 72 hours. Invalid input(s): LDL    Radiology reports as per the Radiologist  Radiology: CT HEAD WO CONTRAST    Result Date: 7/3/2021  CT SCAN OF THE BRAIN WITHOUT CONTRAST COMPARISON: 7/2/2021. TECHNIQUE: A noncontrast CT scan of the brain was performed. A dose reduction technique was utilized. FINDINGS: Right sided craniotomy changes are noted, with underlying postsurgical changes involving the right parietal lobe. Previously noted pneumocephalus is mildly decreased compared to the prior study. Previously noted small amount of subarachnoid blood in association with the right parietal lobe is stable. Stable white matter edema is noted involving the right parietal lobe, and dorsal aspect of the right frontal lobe, the right temporal lobe, the posterior limb of the right internal capsule, and a portion of the right external capsule.  At the level of the septum pellucidum, there is estimated to be approximately 5-6 mm of right-to-left midline shift. This is stable. There is no hydrocephalus or transtentorial herniation. 1.  Mild interval decrease in the previously noted pneumocephalus compared to the prior study. 2.  Postoperative changes are again noted. Small amount of subarachnoid blood in association with the right parietal lobe is stable. 3.  Stable white matter edema involving portions of the right cerebral hemisphere. There is stable approximately 5-6 mm of right-to-left midline shift. This document has been electronically signed by: Neal Vergara M.D. on 07/03/2021 05:52 AM All CTs at this facility use dose modulation techniques and iterative reconstructions, and/or weight-based dosing when appropriate to reduce radiation to a low as reasonably achievable. CT HEAD WO CONTRAST    Result Date: 7/2/2021  CT head without contrast Comparison:  CT,SR  - CT HEAD WO CONTRAST  - 06/29/2021 05:27 AM EDT Findings: Recent surgical changes of partial right parietal resection with overlying craniotomy and scalp staples. Mild postsurgical subarachnoid hemorrhage in the right parietal sulci and moderate right greater than left postsurgical pneumocephalus. Stable white matter vasogenic edema in the right cerebral hemisphere with sparing in the right frontal region. There is similar 5.4 mm leftward midline shift. The visualized paranasal sinuses and mastoid air cells are normal. The orbits are within normal limits. No skull fracture. 1.  Recent partial right parietal resection with small amount of postsurgical subarachnoid hemorrhage, and right greater than left postsurgical pneumocephalus. This may be followed up for resolution. 2.  Stable white matter vasogenic edema in the right cerebral hemisphere with sparing of the right frontal region. Stable 5.4 mm leftward midline shift.  This document has been electronically signed by: Wilbert Chairez MD on 07/02/2021 07:36 PM All CTs at this facility use dose modulation techniques and iterative reconstructions, and/or weight-based dosing when appropriate to reduce radiation to a low as reasonably achievable. CT HEAD WO CONTRAST    Result Date: 6/29/2021  EXAM:  CT HEAD WITHOUT CONTRAST: COMPARISON:  [facility]  - CT HEAD WO CONTRAST  - 06/28/2021 09:18 AM EDT  CT,SR  - CT HEAD WO CONTRAST  - 09/08/2019 04:22 PM EDT TECHNIQUE:  Helical noncontrast axial images from base of skull to vertex, with coronal and sagittal reformats. FINDINGS: No significant change since yesterday. There is effacement of sulci throughout the right temporal and parietal lobes, occipital lobe and posterior right frontal lobe, with extensive low-attenuation prominence of white matter, extending into subcortical regions, and discontinuous calcification in prior to occipital cortex. Low-attenuation extends into the right external and internal capsules. The right lateral ventricle is partially effaced. There is approximately 8 mm of midline shift, not significantly changed. No transtentorial herniation. Possibility of a small amount of cortical hemorrhage, in addition to the apparent calcifications, unchanged. No new hemorrhage. The left hemisphere is grossly unremarkable. No brainstem or cerebellar abnormality is appreciated. Sinuses and mastoid air cells are clear. No calvarial or skull base lesion. Extensive right hemispheric abnormality is concerning for tumor, with mass effect. No significant change since yesterday. Less likely is recent infarct. This document has been electronically signed by: Cinthia Ivey MD on 06/29/2021 08:15 AM All CTs at this facility use dose modulation techniques and iterative reconstructions, and/or weight-based dosing when appropriate to reduce radiation to a low as reasonably achievable.     CT HEAD WO CONTRAST    Result Date: 6/28/2021  PROCEDURE: CT HEAD WO CONTRAST CLINICAL INFORMATION: fal . COMPARISON: 9/8/2019 TECHNIQUE: 2-D multiplanar noncontrast images of the brain All CT scans at this facility use dose modulation, iterative reconstruction, and/or weight-based dosing when appropriate to reduce radiation dose to as low as reasonably achievable. FINDINGS: There is heterogeneous low-density mass in the right parieto-occipital lobe image 21 with a small amount of parenchymal hemorrhage. There is small amount of subarachnoid hemorrhage associated with this. There is extensive white matter edema involving most of the right hemisphere extending into the temporal lobe. This results in compression of the right lateral ventricle and midline shift of 8 mm. There is slight mass effect on the right very subtle mass effect on the right basal cistern. Quadrigeminal cistern is intact. Calvarium is intact. Right globe prosthesis is present. Visualized paranasal sinuses and mastoid air cells are clear     Mixed density mass in the right parietal occipital lobe with extensive surrounding white matter edema and minimal parenchymal and subarachnoid hemorrhage. There is midline shift 8 mm left to right. Compression of the right lateral ventricle. Minimal mass effect on the basilar cistern. This was called to Dr. Chema Mena at time of exam. **This report has been created using voice recognition software. It may contain minor errors which are inherent in voice recognition technology. ** Final report electronically signed by Dr. Melanie Gonzales on 6/28/2021 9:56 AM    CT FACIAL BONES WO CONTRAST    Result Date: 6/28/2021  PROCEDURE: CT FACIAL BONES WO CONTRAST CLINICAL INFORMATION: tr. Fall with right-sided pain and bruising. COMPARISON: CT head dated 9/8/2019 and CT facial bones dated 8/20/2017. TECHNIQUE: Helical CT of the face with sagittal and coronal reconstructions. All CT scans at this facility use dose modulation, iterative reconstruction, and/or weight-based dosing when appropriate to reduce radiation dose to as low as reasonably achievable.  FINDINGS:  Maxillofacial bones are normally aligned without visualized fracture. The mandible appears intact. Patient is edentulous. There is rightward deviation of the nasal septum, stable compared to prior exams. There is a right-sided globe prosthesis, stable compared to prior exam. Paranasal sinuses are clear. Mastoid air cells and middle ears are clear. No evidence of acute osseous injury of the face. **This report has been created using voice recognition software. It may contain minor errors which are inherent in voice recognition technology. ** Final report electronically signed by Dr. Dirk Holland MD on 6/28/2021 10:04 AM    CT CHEST W CONTRAST    Result Date: 6/28/2021  PROCEDURE: CT CHEST W CONTRAST CLINICAL INFORMATION: tr COMPARISON: 8/26/2016 TECHNIQUE:  Axial CT images were obtained through the chest following the intravenous and demonstration of 80 mL Isovue 370 contrast. Coronal and sagittal reformatted images were rendered. All CT scans at this facility use dose modulation, iterative reconstruction, and/or weight-based dosing when appropriate to reduce radiation dose to as low as reasonably achievable. FINDINGS: The central airways appear patent. There is a 3 mm pulmonary nodule within the left lower lobe demonstrated on axial image 58. This appears unchanged from 8/26/2016 and likely represents a poorly calcified granuloma given its stability. No pneumothorax or pleural effusion is seen. No mediastinal hematoma is demonstrated. CT of the abdomen and pelvis are reported separately. No acute osseous findings are demonstrated. 1. No acute traumatic intrathoracic findings. **This report has been created using voice recognition software. It may contain minor errors which are inherent in voice recognition technology. ** Final report electronically signed by Dr. Boris Cintron on 6/28/2021 10:00 AM    CT CERVICAL SPINE WO CONTRAST    Result Date: 6/28/2021  PROCEDURE: CT CERVICAL SPINE WO CONTRAST CLINICAL INFORMATION: tr. Right-sided neck pain and bruising after fall. COMPARISON: CT cervical spine dated 9/8/2019. TECHNIQUE: 3 mm noncontrast axial images were obtained through the cervical spine with sagittal and coronal reconstructions. All CT scans at this facility use dose modulation, iterative reconstruction, and/or weight-based dosing when appropriate to reduce radiation dose to as low as reasonably achievable. FINDINGS: There is a dextrocurvature of the cervical spine which is likely positional.  There is grade 1 anterolisthesis of C7 relative to T1 on the basis of degenerative change. There is stable mild irregularity of the superior endplate of C7 likely associated with Schmorl's node. There is otherwise anatomic vertebral body height and alignment. No fracture of the cervical vertebral column is identified. The craniocervical junction appears intact. No paraspinal or epidural fluid collection is identified. Paraspinal soft tissues are grossly unremarkable. There are stable mild degenerative changes of the cervical spine. No evidence of acute osseous injury of the cervical spine. **This report has been created using voice recognition software. It may contain minor errors which are inherent in voice recognition technology. ** Final report electronically signed by Dr. Dario Dave MD on 6/28/2021 9:54 AM    MRI BRAIN W CONTRAST    Result Date: 7/1/2021  PROCEDURE: MRI BRAIN W CONTRAST CLINICAL INFORMATION: [place] protocol . COMPARISON: MR brain dated 6/28/2021. TECHNIQUE: Axial 3-D T1 weighted radah protocol images were obtained through the brain before and after the administration of 10 mL ProHance injected in the right forearm. Fiducial markers are in place. Sagittal and coronal reconstructions were created.  FINDINGS: Redemonstration of a multilobulated T1 hypointense mass centered at the temporal occipital junction on the right with prominent nodular and irregular curvilinear peripheral enhancement along with thickened enhancing septations measuring 5.5 x 3.8 cm in greatest axial dimensions, not significantly changed compared to prior MRI. There is prominent adjacent hypointense T1 signal. Redemonstration of prominent mass effect on the atrium and occipital horn of the right lateral ventricle with approximately 1 cm leftward midline shift, stable compared to prior exam.      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 **This report has been created using voice recognition software. It may contain minor errors which are inherent in voice recognition technology. ** Final report electronically signed by Dr. Yuriy Gardiner MD on 7/1/2021 10:12 AM    CT ABDOMEN PELVIS W IV CONTRAST Additional Contrast? None    Result Date: 6/28/2021  PROCEDURE: CT ABDOMEN PELVIS W IV CONTRAST CLINICAL INFORMATION: tr . COMPARISON: 8/12/2020 TECHNIQUE: 2-D multiplanar postcontrast images of the abdomen and pelvis Isovue-370 IV contrast. All CT scans at this facility use dose modulation, iterative reconstruction, and/or weight-based dosing when appropriate to reduce radiation dose to as low as reasonably achievable. FINDINGS: No solid organ injury. Liver and spleen are within normal limits. There may be early arterial enhancement causing heterogeneous enhancement of the spleen. Appearance of the liver also suggests early arterial enhancement. Pancreas is unremarkable. Adrenals are normal. Stable cyst lower pole left kidney. Kidneys enhance symmetrically and appear otherwise normal. Heavy atherosclerosis of the aorta. Right common iliac artery stent. No adenopathy. Left anterior abdominal hernia containing colon and small bowel. No obstruction at this time. Postsurgical changes right colon near the cecum. Urinary bladder is distended. Uterus is present.  Postsurgical changes rectosigmoid colon. Bones 9 mm anterolisthesis L5 on S1. Degenerative disc disease. No acute or suspicious bone lesions identified. Remote healed fracture of the right superior and inferior pubic rami. Multiple chronic findings. No acute abdominal or pelvic abnormality. **This report has been created using voice recognition software. It may contain minor errors which are inherent in voice recognition technology. ** Final report electronically signed by Dr. Arthur Wolfe on 6/28/2021 10:03 AM    US LIVER    Result Date: 6/29/2021  Ultrasound of the right upper quadrant Comparison:  US,SR  - US LIVER  - 05/26/2020 10:50 AM EDT Findings: Normal liver. No evidence for mass. Liver measures 14.4 cm. Status post cholecystectomy. Normal common bile duct at 5 mm. Normal visualized portion of the pancreas. Mild right hydronephrosis. No renal mass. Impression: Mild right hydronephrosis. This document has been electronically signed by: Chitra Johnson MD on 06/29/2021 01:28 AM    XR CHEST 1 VIEW    Result Date: 7/1/2021  PROCEDURE: XR CHEST 1 VIEW CLINICAL INFORMATION: preop. COMPARISON: Chest x-ray dated 4 May 2021. TECHNIQUE: AP upright view of the chest. FINDINGS: There is borderline cardiomegaly. There is atherosclerotic calcification in the aortic arch. There is a stent at the origin of the left subclavian artery. There is slightly increased density in the right paratracheal space. There are no pulmonary infiltrates or effusions. The pulmonary vascularity is normal. There are degenerative changes involving the acromioclavicular joints bilaterally and glenohumeral joints. .     1. No interval change since previous study dated for May 2021, no acute cardiopulmonary disease. **This report has been created using voice recognition software. It may contain minor errors which are inherent in voice recognition technology. ** Final report electronically signed by  [facility] on 7/1/2021 10:24 AM    CT LUMBAR RECONSTRUCTION WO POST PROCESS    Result Date: 6/28/2021  PROCEDURE: CT LUMBAR RECONSTRUCTION WO POST PROCESS CLINICAL INFORMATION: fall. Right-sided pain and bruising. COMPARISON: Cervical spine radiographs dated 3/16/2021. TECHNIQUE: 3 mm axial, sagittal and coronal CT images were reconstructed through the lumbar spine. These are reformatted from the patient's abdomen and pelvis CT. IV contrast is present. All CT scans at this facility use dose modulation, iterative reconstruction, and/or weight-based dosing when appropriate to reduce radiation dose to as low as reasonably achievable. FINDINGS: There are chronic bilateral pars defects at L5 with grade 2 anterolisthesis of L5 relative to S1, stable compared to prior radiographs. There is grade 1 retrolisthesis of L1 relative to L2 on the basis of degenerative change, also stable compared to prior radiographs. There is a minimal levocurvature of the lumbar spine, unchanged compared to prior contrast. There is otherwise anatomic vertebral body height and alignment. No acute fracture of the lumbar vertebral column is identified. At T12-L1 there is a minimal disc bulge without significant spinal canal or neuroforaminal stenosis. At L1-2 there is partial uncovering the disc without significant spinal canal stenosis and mild left and mild-to-moderate right neural foraminal stenosis in association with facet hypertrophy. At L2-3 there is a shallow disc bulge which contributes to mild spinal canal stenosis and mild left and moderate right neuroforaminal stenosis in association with facet hypertrophy. At L3-4 there is a shallow disc bulge without significant spinal canal stenosis. There is mild bilateral foraminal stenosis in association with facet hypertrophy. At L4-5 there is no significant spinal canal stenosis. There is mild to moderate bilateral foraminal stenosis in association with shallow disc bulge and facet hypertrophy.  At L5-S1 there is partial uncovering of the disc. There is moderate to severe bilateral neural foraminal stenosis from flattening of the neural foramina and uncovering of the disc associated with the anterolisthesis. 1. No evidence of acute osseous injury of the lumbar spine. 2. 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 3. Multilevel degenerative changes elsewhere. Please refer to the body of the report for segmental analysis. **This report has been created using voice recognition software. It may contain minor errors which are inherent in voice recognition technology. ** Final report electronically signed by Dr. Theodore Harding MD on 6/28/2021 10:00 AM    CT THORACIC RECONSTRUCTION WO POST PROCESS    Result Date: 6/28/2021  PROCEDURE: CT THORACIC RECONSTRUCTION WO POST PROCESS CLINICAL INFORMATION: Trauma . COMPARISON: No prior exam TECHNIQUE: 2-D multiplanar reconstructed images of the thoracic spine. All CT scans at this facility use dose modulation, iterative reconstruction, and/or weight-based dosing when appropriate to reduce radiation dose to as low as reasonably achievable. FINDINGS: Increased thoracic kyphosis. No acute fracture or acute bony malalignment. Diffuse osteopenia. Diffuse degenerative disc disease. No central canal encroachment. Dextroscoliosis. No foraminal encroachment or paraspinal soft tissue abnormality. No acute abnormality of the thoracic spine. **This report has been created using voice recognition software. It may contain minor errors which are inherent in voice recognition technology. ** Final report electronically signed by Dr. Gordo Ladd on 6/28/2021 10:06 AM    MRI BRAIN W WO CONTRAST    Result Date: 6/28/2021  PROCEDURE: MRI BRAIN W WO CONTRAST INDICATION:brain mass. Fall today. COMPARISON: CT head from the same date and MR brain dated 10/23/2009.  TECHNIQUE: Multiplanar and multiple spin echo T1 and T2-weighted errors which are inherent in voice recognition technology. ** Final report electronically signed by Dr. Zion Dorado MD on 6/28/2021 12:45 PM    A/P: S/P patient is seen and evaluated in the ICU setting with evaluation exam findings reviewed and discussed with Dr. Yesenia Miles, with nursing and with critical care. She is resting comfortably with dressings intact over flat dry incisions and a drain intact and functioning at less than 100 cc per shift. Pain is well controlled and she is more active, interactive and verbal with clear appropriate speech and purposeful movement demonstrated for all extremity groups. Neurosurgery is recommending PT and OT as tolerated with up to a chair today. An MRI has been ordered and is pending. Neurosurgery agrees with nursing recommendation to transition Keppra to p.o. and to lock IV fluids.   Neurosurgery to follow    Electronically signed by Rubia Hernandez PA-C on 7/4/2021 at 10:52 AM

## 2021-07-04 NOTE — PROGRESS NOTES
Awake-answers direct questions correctly most of the time but needs reminded of where she is. Follows commands. Denies pain, numbness or tingling. 1220- To radiology for MRI brain. 1305- Returned to room- tolerated procedure well. 1350- Family at bedside. Diet taken well. 1620-Neuro status unchanged. Pt awake, pleasant, giggling. Follows all commands. 1810- Assisted to chair- does well with moving but needs reminded to focus on task. 1920- remains in chair. Brother Boris t bedside.

## 2021-07-05 LAB
ERYTHROCYTE [DISTWIDTH] IN BLOOD BY AUTOMATED COUNT: 14 % (ref 11.5–14.5)
ERYTHROCYTE [DISTWIDTH] IN BLOOD BY AUTOMATED COUNT: 51.5 FL (ref 35–45)
GLUCOSE BLD-MCNC: 161 MG/DL (ref 70–108)
GLUCOSE BLD-MCNC: 194 MG/DL (ref 70–108)
GLUCOSE BLD-MCNC: 98 MG/DL (ref 70–108)
HCT VFR BLD CALC: 36.6 % (ref 37–47)
HEMOGLOBIN: 10.9 GM/DL (ref 12–16)
MCH RBC QN AUTO: 29.9 PG (ref 26–33)
MCHC RBC AUTO-ENTMCNC: 29.8 GM/DL (ref 32.2–35.5)
MCV RBC AUTO: 100.5 FL (ref 81–99)
PLATELET # BLD: 123 THOU/MM3 (ref 130–400)
PMV BLD AUTO: 11.9 FL (ref 9.4–12.4)
RBC # BLD: 3.64 MILL/MM3 (ref 4.2–5.4)
WBC # BLD: 10.7 THOU/MM3 (ref 4.8–10.8)

## 2021-07-05 PROCEDURE — 36415 COLL VENOUS BLD VENIPUNCTURE: CPT

## 2021-07-05 PROCEDURE — 6370000000 HC RX 637 (ALT 250 FOR IP): Performed by: PHYSICIAN ASSISTANT

## 2021-07-05 PROCEDURE — 6360000002 HC RX W HCPCS: Performed by: NEUROLOGICAL SURGERY

## 2021-07-05 PROCEDURE — 2000000000 HC ICU R&B

## 2021-07-05 PROCEDURE — APPSS60 APP SPLIT SHARED TIME 46-60 MINUTES: Performed by: PHYSICIAN ASSISTANT

## 2021-07-05 PROCEDURE — 2580000003 HC RX 258: Performed by: PHYSICIAN ASSISTANT

## 2021-07-05 PROCEDURE — 6370000000 HC RX 637 (ALT 250 FOR IP): Performed by: STUDENT IN AN ORGANIZED HEALTH CARE EDUCATION/TRAINING PROGRAM

## 2021-07-05 PROCEDURE — 99233 SBSQ HOSP IP/OBS HIGH 50: CPT | Performed by: INTERNAL MEDICINE

## 2021-07-05 PROCEDURE — 6360000002 HC RX W HCPCS: Performed by: PHYSICIAN ASSISTANT

## 2021-07-05 PROCEDURE — 99024 POSTOP FOLLOW-UP VISIT: CPT | Performed by: NEUROLOGICAL SURGERY

## 2021-07-05 PROCEDURE — 82948 REAGENT STRIP/BLOOD GLUCOSE: CPT

## 2021-07-05 PROCEDURE — 85027 COMPLETE CBC AUTOMATED: CPT

## 2021-07-05 RX ORDER — HYDRALAZINE HYDROCHLORIDE 25 MG/1
25 TABLET, FILM COATED ORAL EVERY 8 HOURS SCHEDULED
Status: DISCONTINUED | OUTPATIENT
Start: 2021-07-05 | End: 2021-07-08 | Stop reason: HOSPADM

## 2021-07-05 RX ADMIN — SERTRALINE 50 MG: 50 TABLET, FILM COATED ORAL at 10:26

## 2021-07-05 RX ADMIN — HYDRALAZINE HYDROCHLORIDE 25 MG: 25 TABLET, FILM COATED ORAL at 22:07

## 2021-07-05 RX ADMIN — LEVOTHYROXINE SODIUM 100 MCG: 0.1 TABLET ORAL at 06:13

## 2021-07-05 RX ADMIN — SERTRALINE 50 MG: 50 TABLET, FILM COATED ORAL at 20:33

## 2021-07-05 RX ADMIN — DOCUSATE SODIUM 100 MG: 100 CAPSULE, LIQUID FILLED ORAL at 10:18

## 2021-07-05 RX ADMIN — DEXAMETHASONE SODIUM PHOSPHATE 3 MG: 4 INJECTION, SOLUTION INTRA-ARTICULAR; INTRALESIONAL; INTRAMUSCULAR; INTRAVENOUS; SOFT TISSUE at 05:49

## 2021-07-05 RX ADMIN — DEXAMETHASONE SODIUM PHOSPHATE 3 MG: 4 INJECTION, SOLUTION INTRA-ARTICULAR; INTRALESIONAL; INTRAMUSCULAR; INTRAVENOUS; SOFT TISSUE at 10:27

## 2021-07-05 RX ADMIN — LEVETIRACETAM 500 MG: 100 INJECTION INTRAVENOUS at 20:34

## 2021-07-05 RX ADMIN — LEVETIRACETAM 500 MG: 100 INJECTION INTRAVENOUS at 10:19

## 2021-07-05 RX ADMIN — POLYETHYLENE GLYCOL (3350) 17 G: 17 POWDER, FOR SOLUTION ORAL at 10:28

## 2021-07-05 RX ADMIN — SODIUM CHLORIDE, PRESERVATIVE FREE 10 ML: 5 INJECTION INTRAVENOUS at 20:44

## 2021-07-05 RX ADMIN — INSULIN LISPRO 1 UNITS: 100 INJECTION, SOLUTION INTRAVENOUS; SUBCUTANEOUS at 13:35

## 2021-07-05 RX ADMIN — HYDRALAZINE HYDROCHLORIDE 25 MG: 25 TABLET, FILM COATED ORAL at 13:34

## 2021-07-05 RX ADMIN — DEXAMETHASONE SODIUM PHOSPHATE 3 MG: 4 INJECTION, SOLUTION INTRA-ARTICULAR; INTRALESIONAL; INTRAMUSCULAR; INTRAVENOUS; SOFT TISSUE at 17:01

## 2021-07-05 RX ADMIN — SODIUM CHLORIDE, PRESERVATIVE FREE 10 ML: 5 INJECTION INTRAVENOUS at 10:26

## 2021-07-05 RX ADMIN — PANTOPRAZOLE SODIUM 40 MG: 40 TABLET, DELAYED RELEASE ORAL at 06:13

## 2021-07-05 RX ADMIN — ISOSORBIDE MONONITRATE 30 MG: 30 TABLET ORAL at 10:27

## 2021-07-05 RX ADMIN — DEXAMETHASONE SODIUM PHOSPHATE 3 MG: 4 INJECTION, SOLUTION INTRA-ARTICULAR; INTRALESIONAL; INTRAMUSCULAR; INTRAVENOUS; SOFT TISSUE at 22:07

## 2021-07-05 RX ADMIN — ATORVASTATIN CALCIUM 10 MG: 10 TABLET, FILM COATED ORAL at 20:33

## 2021-07-05 RX ADMIN — HYDRALAZINE HYDROCHLORIDE 10 MG: 20 INJECTION, SOLUTION INTRAMUSCULAR; INTRAVENOUS at 06:08

## 2021-07-05 RX ADMIN — DOCUSATE SODIUM 100 MG: 100 CAPSULE, LIQUID FILLED ORAL at 20:33

## 2021-07-05 ASSESSMENT — ENCOUNTER SYMPTOMS
CHOKING: 0
TROUBLE SWALLOWING: 0
SHORTNESS OF BREATH: 0
WHEEZING: 0
BACK PAIN: 0
CONSTIPATION: 0
DIARRHEA: 0
NAUSEA: 0
VOMITING: 0
COLOR CHANGE: 0
BLOOD IN STOOL: 0
SORE THROAT: 0
ABDOMINAL PAIN: 0
ABDOMINAL DISTENTION: 0
COUGH: 0
PHOTOPHOBIA: 0
CHEST TIGHTNESS: 0
RHINORRHEA: 0

## 2021-07-05 ASSESSMENT — PAIN SCALES - GENERAL
PAINLEVEL_OUTOF10: 0
PAINLEVEL_OUTOF10: 0

## 2021-07-05 NOTE — PLAN OF CARE
Problem: Pain:  Goal: Pain level will decrease  Description: Pain level will decrease  Outcome: Ongoing     Problem: Skin Integrity:  Goal: Will show no infection signs and symptoms  Description: Will show no infection signs and symptoms  Outcome: Ongoing  Goal: Absence of new skin breakdown  Description: Absence of new skin breakdown  Outcome: Ongoing     Problem: Falls - Risk of:  Goal: Will remain free from falls  Description: Will remain free from falls  Outcome: Ongoing     Problem: Discharge Planning:  Goal: Discharged to appropriate level of care  Description: Discharged to appropriate level of care  Outcome: Ongoing     Problem: Metabolic:  Goal: Ability to maintain appropriate glucose levels will improve  Description: Ability to maintain appropriate glucose levels will improve  Outcome: Ongoing   Care plan reviewed with patient   Patient  verbalize understanding of the plan of care and contribute to goal setting.

## 2021-07-05 NOTE — PROGRESS NOTES
CRITICAL CARE PROGRESS NOTE      Patient:  Marisol Matthews    Unit/Bed:4D-03/003-A  YOB: 1933  MRN: [id number]   PCP: Priyanka Mehta MD  Date of Admission: 6/28/2021  Chief Complaint:-   Chief Complaint   Patient presents with    Trauma    Fall        Assessment and Plan:    1. Fall: History of frequent falls over the past couple months, had not been evaluated for this till arrival.  Found to have intracranial hemorrhage surrounding brain mass. 2. Subarchnoid hemorrhage: Trauma versus hemorrhagic mass. Status post TXA. SBP between 100-160. No fresh blood on recent CT. 3. 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 Postop day 3 from debulking. Follow-up CT shows a good amount of tumor resection, resolution of midline shift, no new bleeding. Seizure prophylaxis, Decadron weaning. PT OT ordered discussed case with Dr. Vasquez Akins. potential stepdown tomorrow. 4. Hypertension: On Cardene, hydralazine, Imdur. Will start switching over slowly to p.o. medications. 5. Hx Temporal arteritis: On Decadron. Has prosthetic right eye. 6. HLD: On Lipitor  7. Hypothyroidism : Synthroid ordered  8. Hx of breast cancer: CT abdomen and pelvis not significant for suspicious lesions   9. Fluids: None, tolerating p.o.   10. DVT prophylaxis: Held for OR today  11. GI prophylaxis: Protonix 40 daily  12. Nutrition: Tolerating diet well  13. 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 Patient has had frequent falls over the past month, had a fall when trying to go to the bathroom, striking her right forehead on the nightstand.   Patient denies loss of consciousness. Unable to get up due to generalized weakness. Home health provider arrived, called EMS. Patient was level 2 trauma due to Plavix and head trauma. CT found to have spiculated right-sided brain mass, subarachnoid hemorrhage. Admitted to trauma due to element of fall and presence of head bleed. Plan to go to the OR 7/2 for debulking. 7/1 - no complaints. Patient aware of plan to go to the OR tomorrow. Spoke with Dr. Virgilio Rubinstein, who requested tagged to be done today, 2 units on hold for the OR tomorrow, n.p.o. at midnight. 7/2 -patient had mild nausea overnight, improved after antiemetic. Patient asymptomatic at this time. Patient n.p.o. since midnight. Off anticoagulation. Given Gleodon plan to go to the OR at 1000.     7/5 -postop day 3 from tumor resection. Pathology still pending. Patient tolerating p.o., minimal ambulation. PT OT ordered. Possible stepdown tomorrow.   Discussed case with Dr. Yolie Devine    Past Medical History:    Past Medical History:   Diagnosis Date    Anxiety     Blind right eye     Breast cancer metastasized to liver ([facility] [address] 05/10/2016    Liver lesion noted     Carotid stenosis      Left ICA 25%    Col[facility] in place [facility]) 05/27/2016    Degenerative arthritis of cervical spine 5/07    GERD (gastroesophageal reflux disease) 11/06    Hypercholesteremia     Hypoestrogenism     Hypothyroidism     Lumbago     Lumbosacral spinal stenosis 05/2019    MDRO (multiple drug resistant organisms) resistance 2008    pt stated cleared    Personal history of cardiac dysrhythmia     Sigmoid diverticulosis 8/04    Spondylolisthesis of lumbosacral region 8/04    Steroid-induced hyperglycemia     Subclavian artery stenosis (HCC)     left    Superior and Inferior Pubic ramus fracture, right, closed, initial encounter ([facility] [place] [address] 05/21/2019    SVT (supraventricular tachycardia) ([address] 6/07    Temporal arteritis (HCC)     Vertebral artery occlusion     left    Vitamin D deficiency 06/2017      Family History:    Family History   Problem Relation Age of Onset    Cancer Sister 61        breast    Cancer Brother 79        lung    Cancer Brother 68        colon    Cancer Son 48        lung      Social History:    Social History       Tobacco History       Smoking Status  Former Smoker Smoking Frequency  0.5 packs/day Smoking Tobacco Type  Cigarettes      Smokeless Tobacco Use  Never Used              Alcohol History       Alcohol Use Status  No              Drug Use       Drug Use Status  No              Sexual Activity       Sexually Active  Never                 . ROS Review of Systems   Constitutional: Negative for chills, diaphoresis, fatigue and fever. HENT: Negative for rhinorrhea, sore throat and trouble swallowing. Eyes: Negative for photophobia and visual disturbance. Respiratory: Negative for cough, choking, chest tightness, shortness of breath and wheezing. Cardiovascular: Negative for chest pain, palpitations and leg swelling. Gastrointestinal: Negative for abdominal distention, abdominal pain, blood in stool, constipation, diarrhea, nausea and vomiting. Genitourinary: Negative for decreased urine volume, difficulty urinating, dysuria, flank pain, frequency and urgency. Musculoskeletal: Negative for arthralgias, back pain, myalgias, neck pain and neck stiffness. Skin: Negative for color change and rash. Neurological: Negative for syncope, weakness, light-headedness and numbness.         Scheduled Meds:   hydrALAZINE  25 mg Oral 3 times per day    dexamethasone  3 mg Intravenous Q6H    sodium chloride flush  5-40 mL Intravenous 2 times per day    isosorbide mononitrate  30 mg Oral Daily    levothyroxine  100 mcg Oral Daily    pantoprazole  40 mg Oral QAM AC    sertraline  50 mg Oral BID    atorvastatin  10 mg Oral Nightly    polyethylene glycol  17 g Oral Daily    lidocaine  2 patch Topical Daily    levetiracetam  500 mg Intravenous Q12H  insulin lispro  0-6 Units Subcutaneous TID     insulin lispro  0-3 Units Subcutaneous Nightly    docusate sodium  100 mg Oral BID     Continuous Infusions:   sodium chloride      niCARdipine      dextrose         PHYSICAL EXAMINATION:  Vitals:    07/05/21 0500 07/05/21 0600 07/05/21 0608 07/05/21 0700   BP: 136/80 (!) 192/72 (!) 192/73 (!) 164/65   Pulse: 70 69  75   Resp: 19 19  18   Temp:       TempSrc:       SpO2:  91%  94%   Weight:       Height:            In: 1350   Out: 1650 [Urine:1650]     Body mass index is 25.53 kg/m². GCS:   No data recorded         Physical Exam  HENT:      Head: Normocephalic. Right Ear: External ear normal.      Left Ear: External ear normal.      Nose: Nose normal. No rhinorrhea. Mouth/Throat:      Mouth: Mucous membranes are moist.      Pharynx: Oropharynx is clear. Eyes:      Conjunctiva/sclera: Conjunctivae normal.      Comments: Disconjugate gaze, right prosthetic eye. Left eye has very minimal reaction to light   Neck:      Vascular: No carotid bruit. Cardiovascular:      Rate and Rhythm: Normal rate and regular rhythm. Pulses: Normal pulses. Heart sounds: Normal heart sounds. No murmur heard. No gallop. Pulmonary:      Effort: Pulmonary effort is normal. No respiratory distress. Breath sounds: Normal breath sounds. No wheezing or rales. Chest:      Chest wall: No tenderness. Abdominal:      General: Abdomen is flat. Bowel sounds are normal. There is no distension. Palpations: Abdomen is soft. Tenderness: There is no abdominal tenderness. Musculoskeletal:         General: No swelling, tenderness, deformity or signs of injury. Normal range of motion. Cervical back: Normal range of motion and neck supple. Right lower leg: No edema. Left lower leg: No edema. Comments: Can ambulate short distances with assistance   Skin:     General: Skin is warm and dry.       Capillary Refill: Capillary refill

## 2021-07-05 NOTE — PROGRESS NOTES
Addendum by Dr. Renate Mauricio MD:  I have seen and examined the patient independently. Face to face evaluation and examination was performed. The below evaluation and note have been reviewed. Labs and radiographs were reviewed. I Have discussed with Neurosurgery PA about this patient in detail. The below assessment and plan have been reviewed. Please see my modifications mentioned below. My additional comments and modifications:    · Postop day 3 ( right sided craniectomy and resection of right sided intraparenchymal tumor. ). · Feels better today. ·  visual issue has been resolved today  · No new neurological deficit. · Postoperative brain CT showed the expected postoperative changes   · pain control. · Medical management per ICU team.  ·  Post op  brain MRI showed findings suggestive of total resection of the tumor. · Final results of the biopsy showed findings consistent with metastatic breast cancer. · PT and OT. · Neurosurgery will follow. Renate Mauricio MD         Neurosurgery Progress Note    Patient:  [facility]      Unit/Bed:[id number]    YOB: 1933    MRN: [id number]     Acct: [de-identified]     Admit date: 6/28/2021    Chief Complaint   Patient presents with    Trauma    Fall       Patient Seen, Chart, Physician notes, Labs, Radiology studies reviewed. Subjective: The patient is seen and evaluated in the ICU setting with evaluation exam findings reviewed and discussed with Dr. Mitra Rajput and with nursing. She is resting comfortably postoperative day #3 from craniotomy and evacuation of intercranial lesion. Pain is well controlled today. Past, Family, Social History unchanged from admission. Diet:  ADULT DIET;  Regular    Medications:  Scheduled Meds:   dexamethasone  3 mg Intravenous Q6H    sodium chloride flush  5-40 mL Intravenous 2 times per day    isosorbide mononitrate  30 mg Oral Daily    levothyroxine  100 mcg Oral Daily    pantoprazole  40 mg Oral QAM AC    sertraline  50 mg Oral BID    atorvastatin  10 mg Oral Nightly    polyethylene glycol  17 g Oral Daily    lidocaine  2 patch Topical Daily    levetiracetam  500 mg Intravenous Q12H    insulin lispro  0-6 Units Subcutaneous TID WC    insulin lispro  0-3 Units Subcutaneous Nightly    docusate sodium  100 mg Oral BID     Continuous Infusions:   sodium chloride      niCARdipine      dextrose       PRN Meds:fentanNYL, sodium chloride flush, sodium chloride, ondansetron **OR** ondansetron, HYDROcodone 5 mg - acetaminophen, HYDROcodone 5 mg - acetaminophen, hydrALAZINE, ondansetron **OR** ondansetron, fleet, glucose, dextrose, glucagon (rDNA), dextrose    Objective: The patient is observed sitting up in a chair comfortably with pain well-controlled and dressings intact over a flat dry incision. The drain is intact and functioning with only trace output. She remained stable and grossly intact having ambulated about the room with assistance. No additional changes are noted the patient chart overnight. Vitals: BP (!) 164/65   Pulse 75   Temp 97.3 °F (36.3 °C) (Bladder)   Resp 18   Ht 5' (1.524 m)   Wt 130 lb 11.7 oz (59.3 kg)   LMP  (LMP Unknown)   SpO2 94%   BMI 25.53 kg/m²   Physical Exam:  Alert and attentive. Language appropriate, with no aphasia. Pupils equal.  Facial strength symmetric. Physical Exam  Patient is resting comfortably with pain well-controlled. She demonstrates clear appropriate speech and purposeful movement for all extremity groups. She remained stable and intact her baseline with no additional changes noted to the patient chart overnight. ROS:  Review of Systems  Complaints of mild persistent headache are improved. Some incisional site discomfort is noted. Patient denies chest pain, shortness of breath, nausea or vomiting, or dizziness.     24 hour intake/output:    Intake/Output Summary (Last 24 hours) at 7/5/2021 0750  Last data filed at 7/5/2021 2486  Gross per 24 hour   Intake 1350 ml   Output 1650 ml   Net -300 ml     Last 3 weights: Wt Readings from Last 3 Encounters:   06/30/21 130 lb 11.7 oz (59.3 kg)   06/15/21 137 lb 4 oz (62.3 kg)   05/25/21 136 lb (61.7 kg)         CBC:   Recent Labs     07/03/21  0333 07/03/21  0333 07/04/21  0637 07/04/21  1910 07/05/21  0350   WBC 15.7*  --  8.5  --  10.7   HGB 10.6*   < > 9.2* 10.0* 10.9*     --  101*  --  123*    < > = values in this interval not displayed. BMP:    Recent Labs     07/03/21  0333 07/03/21  1151 07/03/21  1939    142 138   K 4.0 3.7 4.2    107 104   CO2 24 24 24   BUN 16 14 13   CREATININE 0.8 0.7 0.6   GLUCOSE 141* 79 136*     Calcium:  Recent Labs     07/03/21 1939   CALCIUM 8.1*     Magnesium:No results for input(s): MG in the last 72 hours. Glucose:  Recent Labs     07/04/21  0836 07/04/21  1707 07/04/21 2038   POCGLU 127* 130* 230*     HgbA1C: No results for input(s): LABA1C in the last 72 hours. Lipids: No results for input(s): CHOL, TRIG, HDL, LDLCALC in the last 72 hours. Invalid input(s): LDL    Radiology reports as per the Radiologist  Radiology: CT HEAD WO CONTRAST    Result Date: 7/3/2021  CT SCAN OF THE BRAIN WITHOUT CONTRAST COMPARISON: 7/2/2021. TECHNIQUE: A noncontrast CT scan of the brain was performed. A dose reduction technique was utilized. FINDINGS: Right sided craniotomy changes are noted, with underlying postsurgical changes involving the right parietal lobe. Previously noted pneumocephalus is mildly decreased compared to the prior study. Previously noted small amount of subarachnoid blood in association with the right parietal lobe is stable. Stable white matter edema is noted involving the right parietal lobe, and dorsal aspect of the right frontal lobe, the right temporal lobe, the posterior limb of the right internal capsule, and a portion of the right external capsule.  At the level of the septum pellucidum, there is estimated to be approximately 5-6 mm of right-to-left midline shift. This is stable. There is no hydrocephalus or transtentorial herniation. 1.  Mild interval decrease in the previously noted pneumocephalus compared to the prior study. 2.  Postoperative changes are again noted. Small amount of subarachnoid blood in association with the right parietal lobe is stable. 3.  Stable white matter edema involving portions of the right cerebral hemisphere. There is stable approximately 5-6 mm of right-to-left midline shift. This document has been electronically signed by: Naomi Thorpe M.D. on 07/03/2021 05:52 AM All CTs at this facility use dose modulation techniques and iterative reconstructions, and/or weight-based dosing when appropriate to reduce radiation to a low as reasonably achievable. CT HEAD WO CONTRAST    Result Date: 7/2/2021  CT head without contrast Comparison:  CT,SR  - CT HEAD WO CONTRAST  - 06/29/2021 05:27 AM EDT Findings: Recent surgical changes of partial right parietal resection with overlying craniotomy and scalp staples. Mild postsurgical subarachnoid hemorrhage in the right parietal sulci and moderate right greater than left postsurgical pneumocephalus. Stable white matter vasogenic edema in the right cerebral hemisphere with sparing in the right frontal region. There is similar 5.4 mm leftward midline shift. The visualized paranasal sinuses and mastoid air cells are normal. The orbits are within normal limits. No skull fracture. 1.  Recent partial right parietal resection with small amount of postsurgical subarachnoid hemorrhage, and right greater than left postsurgical pneumocephalus. This may be followed up for resolution. 2.  Stable white matter vasogenic edema in the right cerebral hemisphere with sparing of the right frontal region. Stable 5.4 mm leftward midline shift.  This document has been electronically signed by: Coleman Ayoub MD on 07/02/2021 07:36 PM All CTs at this facility use dose modulation techniques and iterative reconstructions, and/or weight-based dosing when appropriate to reduce radiation to a low as reasonably achievable. CT HEAD WO CONTRAST    Result Date: 6/29/2021  EXAM:  CT HEAD WITHOUT CONTRAST: COMPARISON:  CT,KOSR  - CT HEAD WO CONTRAST  - 06/28/2021 09:18 AM EDT  CT,SR  - CT HEAD WO CONTRAST  - 09/08/2019 04:22 PM EDT TECHNIQUE:  Helical noncontrast axial images from base of skull to vertex, with coronal and sagittal reformats. FINDINGS: No significant change since yesterday. There is effacement of sulci throughout the right temporal and parietal lobes, occipital lobe and posterior right frontal lobe, with extensive low-attenuation prominence of white matter, extending into subcortical regions, and discontinuous calcification in prior to occipital cortex. Low-attenuation extends into the right external and internal capsules. The right lateral ventricle is partially effaced. There is approximately 8 mm of midline shift, not significantly changed. No transtentorial herniation. Possibility of a small amount of cortical hemorrhage, in addition to the apparent calcifications, unchanged. No new hemorrhage. The left hemisphere is grossly unremarkable. No brainstem or cerebellar abnormality is appreciated. Sinuses and mastoid air cells are clear. No calvarial or skull base lesion. Extensive right hemispheric abnormality is concerning for tumor, with mass effect. No significant change since yesterday. Less likely is recent infarct. This document has been electronically signed by: Yen Lema MD on 06/29/2021 08:15 AM All CTs at this facility use dose modulation techniques and iterative reconstructions, and/or weight-based dosing when appropriate to reduce radiation to a low as reasonably achievable.     CT HEAD WO CONTRAST    Result Date: 6/28/2021  PROCEDURE: CT HEAD WO CONTRAST CLINICAL INFORMATION: fal . COMPARISON: 9/8/2019 TECHNIQUE: 2-D multiplanar noncontrast images of the brain All CT scans at this facility use dose modulation, iterative reconstruction, and/or weight-based dosing when appropriate to reduce radiation dose to as low as reasonably achievable. FINDINGS: There is heterogeneous low-density mass in the right parieto-occipital lobe image 21 with a small amount of parenchymal hemorrhage. There is small amount of subarachnoid hemorrhage associated with this. There is extensive white matter edema involving most of the right hemisphere extending into the temporal lobe. This results in compression of the right lateral ventricle and midline shift of 8 mm. There is slight mass effect on the right very subtle mass effect on the right basal cistern. Quadrigeminal cistern is intact. Calvarium is intact. Right globe prosthesis is present. Visualized paranasal sinuses and mastoid air cells are clear     Mixed density mass in the right parietal occipital lobe with extensive surrounding white matter edema and minimal parenchymal and subarachnoid hemorrhage. There is midline shift 8 mm left to right. Compression of the right lateral ventricle. Minimal mass effect on the basilar cistern. This was called to Dr. Angel Wilcox at time of exam. **This report has been created using voice recognition software. It may contain minor errors which are inherent in voice recognition technology. ** Final report electronically signed by Dr. Maryuri Foley on 6/28/2021 9:56 AM    CT FACIAL BONES WO CONTRAST    Result Date: 6/28/2021  PROCEDURE: CT FACIAL BONES WO CONTRAST CLINICAL INFORMATION: tr. Fall with right-sided pain and bruising. COMPARISON: CT head dated 9/8/2019 and CT facial bones dated 8/20/2017. TECHNIQUE: Helical CT of the face with sagittal and coronal reconstructions. All CT scans at this facility use dose modulation, iterative reconstruction, and/or weight-based dosing when appropriate to reduce radiation dose to as low as reasonably achievable.  FINDINGS:  Maxillofacial bones are normally aligned without visualized fracture. The mandible appears intact. Patient is edentulous. There is rightward deviation of the nasal septum, stable compared to prior exams. There is a right-sided globe prosthesis, stable compared to prior exam. Paranasal sinuses are clear. Mastoid air cells and middle ears are clear. No evidence of acute osseous injury of the face. **This report has been created using voice recognition software. It may contain minor errors which are inherent in voice recognition technology. ** Final report electronically signed by Dr. Dirk Holland MD on 6/28/2021 10:04 AM    CT CHEST W CONTRAST    Result Date: 6/28/2021  PROCEDURE: CT CHEST W CONTRAST CLINICAL INFORMATION: tr COMPARISON: 8/26/2016 TECHNIQUE:  Axial CT images were obtained through the chest following the intravenous and demonstration of 80 mL Isovue 370 contrast. Coronal and sagittal reformatted images were rendered. All CT scans at this facility use dose modulation, iterative reconstruction, and/or weight-based dosing when appropriate to reduce radiation dose to as low as reasonably achievable. FINDINGS: The central airways appear patent. There is a 3 mm pulmonary nodule within the left lower lobe demonstrated on axial image 58. This appears unchanged from 8/26/2016 and likely represents a poorly calcified granuloma given its stability. No pneumothorax or pleural effusion is seen. No mediastinal hematoma is demonstrated. CT of the abdomen and pelvis are reported separately. No acute osseous findings are demonstrated. 1. No acute traumatic intrathoracic findings. **This report has been created using voice recognition software. It may contain minor errors which are inherent in voice recognition technology. ** Final report electronically signed by Dr. Boris Cintron on 6/28/2021 10:00 AM    CT CERVICAL SPINE WO CONTRAST    Result Date: 6/28/2021  PROCEDURE: CT CERVICAL SPINE WO CONTRAST CLINICAL INFORMATION: tr. Right-sided neck pain and bruising after fall. COMPARISON: CT cervical spine dated 9/8/2019. TECHNIQUE: 3 mm noncontrast axial images were obtained through the cervical spine with sagittal and coronal reconstructions. All CT scans at this facility use dose modulation, iterative reconstruction, and/or weight-based dosing when appropriate to reduce radiation dose to as low as reasonably achievable. FINDINGS: There is a dextrocurvature of the cervical spine which is likely positional.  There is grade 1 anterolisthesis of C7 relative to T1 on the basis of degenerative change. There is stable mild irregularity of the superior endplate of C7 likely associated with Schmorl's node. There is otherwise anatomic vertebral body height and alignment. No fracture of the cervical vertebral column is identified. The craniocervical junction appears intact. No paraspinal or epidural fluid collection is identified. Paraspinal soft tissues are grossly unremarkable. There are stable mild degenerative changes of the cervical spine. No evidence of acute osseous injury of the cervical spine. **This report has been created using voice recognition software. It may contain minor errors which are inherent in voice recognition technology. ** Final report electronically signed by Dr. Pablo Morales MD on 6/28/2021 9:54 AM    MRI BRAIN W CONTRAST    Result Date: 7/1/2021  PROCEDURE: MRI BRAIN W CONTRAST CLINICAL INFORMATION: Raymond protocol . COMPARISON: MR brain dated 6/28/2021. TECHNIQUE: Axial 3-D T1 weighted raymond protocol images were obtained through the brain before and after the administration of 10 mL ProHance injected in the right forearm. Fiducial markers are in place. Sagittal and coronal reconstructions were created.  FINDINGS: Redemonstration of a multilobulated T1 hypointense mass centered at the temporal occipital junction on the right with prominent nodular and irregular curvilinear peripheral enhancement along with thickened enhancing septations measuring 5.5 x 3.8 cm in greatest axial dimensions, not significantly changed compared to prior MRI. There is prominent adjacent hypointense T1 signal. Redemonstration of prominent mass effect on the atrium and occipital horn of the right lateral ventricle with approximately 1 cm leftward midline shift, stable compared to prior exam.      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 **This report has been created using voice recognition software. It may contain minor errors which are inherent in voice recognition technology. ** Final report electronically signed by Dr. Zion Dorado MD on 7/1/2021 10:12 AM    CT ABDOMEN PELVIS W IV CONTRAST Additional Contrast? None    Result Date: 6/28/2021  PROCEDURE: CT ABDOMEN PELVIS W IV CONTRAST CLINICAL INFORMATION: tr . COMPARISON: 8/12/2020 TECHNIQUE: 2-D multiplanar postcontrast images of the abdomen and pelvis Isovue-370 IV contrast. All CT scans at this facility use dose modulation, iterative reconstruction, and/or weight-based dosing when appropriate to reduce radiation dose to as low as reasonably achievable. FINDINGS: No solid organ injury. Liver and spleen are within normal limits. There may be early arterial enhancement causing heterogeneous enhancement of the spleen. Appearance of the liver also suggests early arterial enhancement. Pancreas is unremarkable. Adrenals are normal. Stable cyst lower pole left kidney. Kidneys enhance symmetrically and appear otherwise normal. Heavy atherosclerosis of the aorta. Right common iliac artery stent. No adenopathy. Left anterior abdominal hernia containing colon and small bowel. No obstruction at this time. Postsurgical changes right colon near the cecum. Urinary bladder is distended. Uterus is present. Postsurgical changes rectosigmoid colon. Bones 9 mm anterolisthesis L5 on S1. Degenerative disc disease. No acute or suspicious bone lesions identified. Remote healed fracture of the right superior and inferior pubic rami. Multiple chronic findings. No acute abdominal or pelvic abnormality. **This report has been created using voice recognition software. It may contain minor errors which are inherent in voice recognition technology. ** Final report electronically signed by Dr. Rick Alvarado on 6/28/2021 10:03 AM    US LIVER    Result Date: 6/29/2021  Ultrasound of the right upper quadrant Comparison:  US,SR  - US LIVER  - 05/26/2020 10:50 AM EDT Findings: Normal liver. No evidence for mass. Liver measures 14.4 cm. Status post cholecystectomy. Normal common bile duct at 5 mm. Normal visualized portion of the pancreas. Mild right hydronephrosis. No renal mass. Impression: Mild right hydronephrosis. This document has been electronically signed by: Ciera Mckinney MD on 06/29/2021 01:28 AM    XR CHEST 1 VIEW    Result Date: 7/1/2021  PROCEDURE: XR CHEST 1 VIEW CLINICAL INFORMATION: preop. COMPARISON: Chest x-ray dated 4 May 2021. TECHNIQUE: AP upright view of the chest. FINDINGS: There is borderline cardiomegaly. There is atherosclerotic calcification in the aortic arch. There is a stent at the origin of the left subclavian artery. There is slightly increased density in the right paratracheal space. There are no pulmonary infiltrates or effusions. The pulmonary vascularity is normal. There are degenerative changes involving the acromioclavicular joints bilaterally and glenohumeral joints. .     1. No interval change since previous study dated for May 2021, no acute cardiopulmonary disease. **This report has been created using voice recognition software. It may contain minor errors which are inherent in voice recognition technology. ** Final report electronically signed by DR Nicole Smart on 7/1/2021 10:24 AM    CT LUMBAR RECONSTRUCTION WO POST PROCESS    Result Date: 6/28/2021  PROCEDURE: CT LUMBAR RECONSTRUCTION WO POST PROCESS CLINICAL INFORMATION: fall. Right-sided pain and bruising. COMPARISON: Cervical spine radiographs dated 3/16/2021. TECHNIQUE: 3 mm axial, sagittal and coronal CT images were reconstructed through the lumbar spine. These are reformatted from the patient's abdomen and pelvis CT. IV contrast is present. All CT scans at this facility use dose modulation, iterative reconstruction, and/or weight-based dosing when appropriate to reduce radiation dose to as low as reasonably achievable. FINDINGS: There are chronic bilateral pars defects at L5 with grade 2 anterolisthesis of L5 relative to S1, stable compared to prior radiographs. There is grade 1 retrolisthesis of L1 relative to L2 on the basis of degenerative change, also stable compared to prior radiographs. There is a minimal levocurvature of the lumbar spine, unchanged compared to prior contrast. There is otherwise anatomic vertebral body height and alignment. No acute fracture of the lumbar vertebral column is identified. At T12-L1 there is a minimal disc bulge without significant spinal canal or neuroforaminal stenosis. At L1-2 there is partial uncovering the disc without significant spinal canal stenosis and mild left and mild-to-moderate right neural foraminal stenosis in association with facet hypertrophy. At L2-3 there is a shallow disc bulge which contributes to mild spinal canal stenosis and mild left and moderate right neuroforaminal stenosis in association with facet hypertrophy. At L3-4 there is a shallow disc bulge without significant spinal canal stenosis. There is mild bilateral foraminal stenosis in association with facet hypertrophy. At L4-5 there is no significant spinal canal stenosis. There is mild to moderate bilateral foraminal stenosis in association with shallow disc bulge and facet hypertrophy.  At L5-S1 there is partial uncovering of the disc. There is moderate to severe bilateral neural foraminal stenosis from flattening of the neural foramina and uncovering of the disc associated with the anterolisthesis. 1. No evidence of acute osseous injury of the lumbar spine. 2. 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 3. Multilevel degenerative changes elsewhere. Please refer to the body of the report for segmental analysis. **This report has been created using voice recognition software. It may contain minor errors which are inherent in voice recognition technology. ** Final report electronically signed by Dr. Ameena Will MD on 6/28/2021 10:00 AM    CT THORACIC RECONSTRUCTION WO POST PROCESS    Result Date: 6/28/2021  PROCEDURE: CT THORACIC RECONSTRUCTION WO POST PROCESS CLINICAL INFORMATION: Trauma . COMPARISON: No prior exam TECHNIQUE: 2-D multiplanar reconstructed images of the thoracic spine. All CT scans at this facility use dose modulation, iterative reconstruction, and/or weight-based dosing when appropriate to reduce radiation dose to as low as reasonably achievable. FINDINGS: Increased thoracic kyphosis. No acute fracture or acute bony malalignment. Diffuse osteopenia. Diffuse degenerative disc disease. No central canal encroachment. Dextroscoliosis. No foraminal encroachment or paraspinal soft tissue abnormality. No acute abnormality of the thoracic spine. **This report has been created using voice recognition software. It may contain minor errors which are inherent in voice recognition technology. ** Final report electronically signed by Dr. Ronnie Menchaca on 6/28/2021 10:06 AM    MRI BRAIN W WO CONTRAST    Addendum Date: 7/5/2021    PROCEDURE: MRI BRAIN W WO CONTRAST CLINICAL INFORMATIONPost craniotomy and resection of tumor. COMPARISON: CT scan of the brain dated 3 July 2021. [place]  MRI scan of the brain dated July 1, 2021. TECHNIQUE: Multiplanar and multiple spin echo T1 and T2-weighted images were obtained through the brain before and after the administration of intravenous contrast. FINDINGS: The patient has undergone interval surgery and removal of the large mass in the right posterior temporal lobe is a residual 3.9 x 3.1 x 1.7 cm in the right posterior temporal lobe with increased T1 weighted signal intensity which may represent hemorrhage. There is moderate surrounding edema. There is mass effect significantly less marked than on previous study dated July 1, 2021. There is increased signal intensity in the white matter most likely representing ischemic changes The diffusion-weighted images are normal. The brain volume is normal.There are no intra-or extra-axial collections. On the FLAIR and T2-weighted sequences, there is normal signal intensity in the brain. On the gradient echo T2-weighted images, there is mineralization in the medial aspects of the basal ganglia. There is no abnormal enhancement in the brain. The major intracranial vascular flow voids are present. The midline craniocervical junction structures are normal.  The brainstem and pituitary gland are normal. There are postoperative changes in the right orbit. Result Date: 7/5/2021  PROCEDURE: MRI BRAIN W WO CONTRAST CLINICAL INFORMATIONPost craniotomy and resection of tumor. COMPARISON: CT scan of the brain dated 3 July 2021. [facility] MRI scan of the brain dated July 1, 2021. TECHNIQUE: Multiplanar and multiple spin echo T1 and T2-weighted images were obtained through the brain before and after the administration of intravenous contrast. FINDINGS: The patient has undergone interval surgery and removal of the large mass in the right posterior temporal lobe is a residual 3.9 x 3.1 x 1.7 cm in the right posterior temporal lobe with increased T1 weighted signal intensity which may represent hemorrhage. There is moderate surrounding edema.  There is mass effect injected in the right AC. FINDINGS: There is an intra-axial multilobulated mass centered at the right occipital/temporal lobe junction measuring 5.5 x 3.9 cm in greatest axial dimensions. There are lobulated low T1, hyperintense T2 signal components centrally. There is thickened and nodular irregular peripheral enhancement following contrast administration. There are nodular enhancing septations centrally and inferiorly within the lesion. No other enhancing lesions are identified within the parenchyma. There is prominent T2/FLAIR prolongation adjacent to the lesion extending anteriorly into the temporal, parietal and frontal lobes and in the right basal ganglia, as well as the splenium of the corpus callosum. There is prominent mass effect on the occipital horn of the right lateral ventricle with near complete effacement. There is 9 millimeters leftward midline shift at the level of the foramen of [place]. There are moderate confluent and patchy areas of T2/FLAIR prolongation in the periventricular, subcortical deep white matter elsewhere. No focal areas of restricted diffusion are present. The major vascular flow voids appear patent. There is artifact from patient's right ocular implant. Patient is status post lens extraction on the left. Orbits are otherwise unremarkable. Paranasal sinuses and mastoid air cells are clear. Heterogeneous 5.5 cm nodular peripherally enhancing cystic and solid mass centered at the right occipital/temporal lobe junction with prominent adjacent T2/FLAIR prolongation involving the corpus callosum, temporal, parietal and frontal lobes with prominent mass effect on the right lateral ventricle and 9 mm leftward midline shift. 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 **This report has been created using voice recognition software. It may contain minor errors which are inherent in voice recognition technology. ** Final report electronically signed by Dr. Sabrina Mina MD on 6/28/2021 12:45 PM     A/P: S/P the patient is seen and evaluated in the ICU setting with evaluation exam findings reviewed and discussed with Dr. Makenna Morales and with nursing. Patient is resting comfortably postoperative day #3 from craniotomy and evacuation/resection of intercranial mass performed by Dr. Makenna Morales, without complication. Dressings are intact over flat dry incision and the drain is intact and functioning with trace output. She is otherwise stable and grossly intact on exam to her baseline with no additional changes noted to the patient chart overnight. MRI of the brain imaged yesterday with and without contrast was reviewed and reveals resection cavity at the surgical site with moderate surrounding edema and significantly less mass-effect and midline deviation than on previous study. Neurosurgery anticipates removal of the drain to be performed at bedside this afternoon along with a dressing change indicated today. A transition from the ICU setting to the floor following ambulation with PT is anticipated.   Neurosurgery to follow     Electronically signed by Sravanthi Howell PA-C on 7/5/2021 at 7:50 AM

## 2021-07-06 LAB
ALBUMIN SERPL-MCNC: 4 G/DL (ref 3.5–5.1)
ALP BLD-CCNC: 53 U/L (ref 38–126)
ALT SERPL-CCNC: 81 U/L (ref 11–66)
ANION GAP SERPL CALCULATED.3IONS-SCNC: 7 MEQ/L (ref 8–16)
ANION GAP SERPL CALCULATED.3IONS-SCNC: 8 MEQ/L (ref 8–16)
AST SERPL-CCNC: 78 U/L (ref 5–40)
BILIRUB SERPL-MCNC: 0.5 MG/DL (ref 0.3–1.2)
BUN BLDV-MCNC: 20 MG/DL (ref 7–22)
BUN BLDV-MCNC: 21 MG/DL (ref 7–22)
CALCIUM SERPL-MCNC: 8.7 MG/DL (ref 8.5–10.5)
CALCIUM SERPL-MCNC: 8.9 MG/DL (ref 8.5–10.5)
CHLORIDE BLD-SCNC: 107 MEQ/L (ref 98–111)
CHLORIDE BLD-SCNC: 107 MEQ/L (ref 98–111)
CO2: 24 MEQ/L (ref 23–33)
CO2: 26 MEQ/L (ref 23–33)
CREAT SERPL-MCNC: 0.4 MG/DL (ref 0.4–1.2)
CREAT SERPL-MCNC: 0.4 MG/DL (ref 0.4–1.2)
ERYTHROCYTE [DISTWIDTH] IN BLOOD BY AUTOMATED COUNT: 14 % (ref 11.5–14.5)
ERYTHROCYTE [DISTWIDTH] IN BLOOD BY AUTOMATED COUNT: 51.2 FL (ref 35–45)
GFR SERPL CREATININE-BSD FRML MDRD: > 90 ML/MIN/1.73M2
GFR SERPL CREATININE-BSD FRML MDRD: > 90 ML/MIN/1.73M2
GLUCOSE BLD-MCNC: 101 MG/DL (ref 70–108)
GLUCOSE BLD-MCNC: 112 MG/DL (ref 70–108)
GLUCOSE BLD-MCNC: 124 MG/DL (ref 70–108)
GLUCOSE BLD-MCNC: 143 MG/DL (ref 70–108)
GLUCOSE BLD-MCNC: 144 MG/DL (ref 70–108)
GLUCOSE BLD-MCNC: 90 MG/DL (ref 70–108)
HCT VFR BLD CALC: 35.5 % (ref 37–47)
HEMOGLOBIN: 10.5 GM/DL (ref 12–16)
MCH RBC QN AUTO: 29.4 PG (ref 26–33)
MCHC RBC AUTO-ENTMCNC: 29.6 GM/DL (ref 32.2–35.5)
MCV RBC AUTO: 99.4 FL (ref 81–99)
PLATELET # BLD: 118 THOU/MM3 (ref 130–400)
PMV BLD AUTO: 12.1 FL (ref 9.4–12.4)
POTASSIUM REFLEX MAGNESIUM: 4 MEQ/L (ref 3.5–5.2)
POTASSIUM SERPL-SCNC: 3.8 MEQ/L (ref 3.5–5.2)
RBC # BLD: 3.57 MILL/MM3 (ref 4.2–5.4)
SODIUM BLD-SCNC: 138 MEQ/L (ref 135–145)
SODIUM BLD-SCNC: 141 MEQ/L (ref 135–145)
TOTAL PROTEIN: 5.8 G/DL (ref 6.1–8)
WBC # BLD: 10.2 THOU/MM3 (ref 4.8–10.8)

## 2021-07-06 PROCEDURE — 36415 COLL VENOUS BLD VENIPUNCTURE: CPT

## 2021-07-06 PROCEDURE — 97110 THERAPEUTIC EXERCISES: CPT

## 2021-07-06 PROCEDURE — 6360000002 HC RX W HCPCS: Performed by: PHYSICIAN ASSISTANT

## 2021-07-06 PROCEDURE — 6370000000 HC RX 637 (ALT 250 FOR IP): Performed by: STUDENT IN AN ORGANIZED HEALTH CARE EDUCATION/TRAINING PROGRAM

## 2021-07-06 PROCEDURE — 82948 REAGENT STRIP/BLOOD GLUCOSE: CPT

## 2021-07-06 PROCEDURE — 80053 COMPREHEN METABOLIC PANEL: CPT

## 2021-07-06 PROCEDURE — 99233 SBSQ HOSP IP/OBS HIGH 50: CPT | Performed by: INTERNAL MEDICINE

## 2021-07-06 PROCEDURE — 97530 THERAPEUTIC ACTIVITIES: CPT

## 2021-07-06 PROCEDURE — 2580000003 HC RX 258: Performed by: PHYSICIAN ASSISTANT

## 2021-07-06 PROCEDURE — APPSS30 APP SPLIT SHARED TIME 16-30 MINUTES: Performed by: PHYSICIAN ASSISTANT

## 2021-07-06 PROCEDURE — 2060000000 HC ICU INTERMEDIATE R&B

## 2021-07-06 PROCEDURE — 6360000002 HC RX W HCPCS: Performed by: STUDENT IN AN ORGANIZED HEALTH CARE EDUCATION/TRAINING PROGRAM

## 2021-07-06 PROCEDURE — 6360000002 HC RX W HCPCS: Performed by: NEUROLOGICAL SURGERY

## 2021-07-06 PROCEDURE — 85027 COMPLETE CBC AUTOMATED: CPT

## 2021-07-06 PROCEDURE — 6370000000 HC RX 637 (ALT 250 FOR IP): Performed by: PHYSICIAN ASSISTANT

## 2021-07-06 PROCEDURE — 97535 SELF CARE MNGMENT TRAINING: CPT

## 2021-07-06 PROCEDURE — 99024 POSTOP FOLLOW-UP VISIT: CPT | Performed by: NEUROLOGICAL SURGERY

## 2021-07-06 PROCEDURE — 99232 SBSQ HOSP IP/OBS MODERATE 35: CPT | Performed by: NURSE PRACTITIONER

## 2021-07-06 RX ORDER — DEXAMETHASONE SODIUM PHOSPHATE 4 MG/ML
3 INJECTION, SOLUTION INTRA-ARTICULAR; INTRALESIONAL; INTRAMUSCULAR; INTRAVENOUS; SOFT TISSUE EVERY 8 HOURS
Status: DISCONTINUED | OUTPATIENT
Start: 2021-07-06 | End: 2021-07-08

## 2021-07-06 RX ADMIN — LEVETIRACETAM 500 MG: 100 INJECTION INTRAVENOUS at 08:48

## 2021-07-06 RX ADMIN — LEVETIRACETAM 500 MG: 100 INJECTION INTRAVENOUS at 20:01

## 2021-07-06 RX ADMIN — HYDRALAZINE HYDROCHLORIDE 25 MG: 25 TABLET, FILM COATED ORAL at 14:33

## 2021-07-06 RX ADMIN — INSULIN LISPRO 1 UNITS: 100 INJECTION, SOLUTION INTRAVENOUS; SUBCUTANEOUS at 17:09

## 2021-07-06 RX ADMIN — LEVOTHYROXINE SODIUM 100 MCG: 0.1 TABLET ORAL at 06:45

## 2021-07-06 RX ADMIN — POLYETHYLENE GLYCOL (3350) 17 G: 17 POWDER, FOR SOLUTION ORAL at 08:47

## 2021-07-06 RX ADMIN — DEXAMETHASONE SODIUM PHOSPHATE 3 MG: 4 INJECTION, SOLUTION INTRA-ARTICULAR; INTRALESIONAL; INTRAMUSCULAR; INTRAVENOUS; SOFT TISSUE at 06:45

## 2021-07-06 RX ADMIN — DOCUSATE SODIUM 100 MG: 100 CAPSULE, LIQUID FILLED ORAL at 08:46

## 2021-07-06 RX ADMIN — SERTRALINE 50 MG: 50 TABLET, FILM COATED ORAL at 20:01

## 2021-07-06 RX ADMIN — SODIUM CHLORIDE, PRESERVATIVE FREE 10 ML: 5 INJECTION INTRAVENOUS at 20:01

## 2021-07-06 RX ADMIN — SODIUM CHLORIDE, PRESERVATIVE FREE 10 ML: 5 INJECTION INTRAVENOUS at 08:47

## 2021-07-06 RX ADMIN — HYDRALAZINE HYDROCHLORIDE 25 MG: 25 TABLET, FILM COATED ORAL at 22:58

## 2021-07-06 RX ADMIN — DEXAMETHASONE SODIUM PHOSPHATE 3 MG: 4 INJECTION, SOLUTION INTRA-ARTICULAR; INTRALESIONAL; INTRAMUSCULAR; INTRAVENOUS; SOFT TISSUE at 14:33

## 2021-07-06 RX ADMIN — PANTOPRAZOLE SODIUM 40 MG: 40 TABLET, DELAYED RELEASE ORAL at 06:45

## 2021-07-06 RX ADMIN — DEXAMETHASONE SODIUM PHOSPHATE 3 MG: 4 INJECTION, SOLUTION INTRA-ARTICULAR; INTRALESIONAL; INTRAMUSCULAR; INTRAVENOUS; SOFT TISSUE at 22:58

## 2021-07-06 RX ADMIN — HYDRALAZINE HYDROCHLORIDE 25 MG: 25 TABLET, FILM COATED ORAL at 06:45

## 2021-07-06 RX ADMIN — DOCUSATE SODIUM 100 MG: 100 CAPSULE, LIQUID FILLED ORAL at 20:01

## 2021-07-06 RX ADMIN — ATORVASTATIN CALCIUM 10 MG: 10 TABLET, FILM COATED ORAL at 20:01

## 2021-07-06 RX ADMIN — ISOSORBIDE MONONITRATE 30 MG: 30 TABLET ORAL at 08:46

## 2021-07-06 RX ADMIN — SERTRALINE 50 MG: 50 TABLET, FILM COATED ORAL at 08:46

## 2021-07-06 ASSESSMENT — ENCOUNTER SYMPTOMS
APNEA: 0
NAUSEA: 0
SORE THROAT: 0
COUGH: 0
WHEEZING: 0
SHORTNESS OF BREATH: 0
CHEST TIGHTNESS: 0
TROUBLE SWALLOWING: 0
DIARRHEA: 0
PHOTOPHOBIA: 0
ABDOMINAL PAIN: 0
CONSTIPATION: 0
RHINORRHEA: 0
BLOOD IN STOOL: 0
COLOR CHANGE: 0
VOMITING: 0
ABDOMINAL DISTENTION: 0
BACK PAIN: 0
CHOKING: 0

## 2021-07-06 ASSESSMENT — PAIN SCALES - GENERAL
PAINLEVEL_OUTOF10: 0

## 2021-07-06 NOTE — PROGRESS NOTES
[facility] PHYSICAL THERAPY  DAILY NOTE  STR ICU 4D - 4D-03/003-A     Time In: 8185  Time Out: 1123  Timed Code Treatment Minutes: 30 Minutes  Minutes: 30          Date: 2021  Patient Name: Eamon Hobbs,  Gender:  female        MRN: [id number]  : 1933  (80 y.o.)     Referring Practitioner: PAMELA Jamison CNP  Diagnosis: Fall at home  Additional Pertinent Hx: Ishmael Bazan is an 80year old female presenting to the Emergency Department via EMS for evaluation of potential injuries sustained when she fell in her home this morning around 5 am.  She reports that she was going back to her bed from her bathroom when her legs became weak and she fell. She is not sure if she hit her head on the nightstand but she states that the lamp that was on the nightstand fell on top of her, hitting the right side of her forehead/eye. She denies loss of consciousness. She was found by her home health aide around 0800 as she was not able to get herself up after falling. EMS reports that they have been to Latanya's home multiple times over the last few weeks for lift assist but she refuses to go to the hospital for evaluation. Ishmael Bazan reports that her legs have been progressively becoming more weak. She denies headache, neck pain, chest pain, abdominal pain. She does admit to lower back pain and lower extremity weakness. No visual changes, lightheadedness or dizziness, nausea or vomiting. She denies any feelings of syncope prior to falling. 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      Prior Level of Function:  Lives With: Alone  Type of Home: House  Home Layout: One level  Home Access: Level entry  Home Equipment: [employer], 4 wheeled walker, Cane, Lift chair, Alert Button   Bathroom Shower/Tub: Tub/Shower unit, Shower chair with back  [employer]: Standard (with BSC over toilet)  Bathroom Equipment: Shower chair, 3-in-1 commode    Receives Help From: Personal care attendant  ADL Assistance: Needs assistance  Homemaking Assistance: Needs assistance  Homemaking Responsibilities: Yes  Ambulation Assistance: Independent  Transfer Assistance: Independent  Active : No  Additional Comments: 2 hours/day DIVINA has an aide that assists with ADLs/IADLs. nieces live nearby and able to help at times. Restrictions/Precautions:  Restrictions/Precautions: General Precautions, Fall Risk  Position Activity Restriction  Other position/activity restrictions: L neglect, R artificial eye      SUBJECTIVE: Pt status post resection of brain tumor on 6/2. The pt states she is feeling a lot better. The pt was pleasant and cooperative and agreeable to therapy services. PAIN: denied    Vitals: Blood Pressure: 127/81    OBJECTIVE:  Bed Mobility:  Sit to Supine: Stand By Assistance, with head of bed flat, with rail, with verbal cues , with increased time for completion   Scooting: Stand By Assistance, X 1, with head of bed flat, with rail, with increased time for completion    Transfers:  Sit to Stand: [address], with increased time for completion, cues for hand placement  Stand to [facility] [address], with increased time for completion, cues for hand placement    Ambulation:  Contact Guard Assistance, with cues for safety, with increased time for completion. Pt needed cues to bring RW initially, however did not need cues after initial cues. Distance: 50 feet   Surface: Level Tile  Device:Rolling Walker  Gait Deviations: Forward Flexed Posture, Decreased Gait Speed and Mild Path Deviations    Balance:  Static Sitting Balance:  Supervision  Dynamic Sitting Balance: Stand By Assistance  Static Standing Balance: Stand By Assistance, with cues for safety    Exercise:  Patient was guided in 1 set(s) 10 reps of exercise to both lower extremities.   Heelslides, Short arc quads, Hip abduction/adduction, Straight leg raises, Seated marches, Seated heel/toe raises and Long arc quads. Exercises were completed for increased independence with functional mobility. Functional Outcome Measures: Completed  AM-PAC Inpatient Mobility Raw Score : 17  AM-PAC Inpatient T-Scale Score : 42.13    ASSESSMENT:  Assessment: Patient progressing toward established goals. Compared to evaluation, pt met 3/4 of her initial short term goals and is progressing with her mobility. Activity Tolerance:  Patient tolerance of  treatment: good. Equipment Recommendations:Equipment Needed: No  Discharge Recommendations:   IP Rehab, Continue to assess pending progress    Plan: Times per week: 6x T  Current Treatment Recommendations: Strengthening, Transfer Training, ROM, Balance Training, Gait Training, Functional Mobility Training, Home Exercise Program    Patient Education  Patient Education: Plan of Care, Home Exercise Program, Precautions/Restrictions    Goals:  Patient goals : go home  Short term goals  Short term goal 1: Pt will supine<>sit with supervision in order to get out of bed  Short term goal 2: Pt will sit<>stand with SBA  and RW inorder to transfer surfaces-MET   Short term goal 3: Pt will perform stand/pivot transfer with use of RW, CGA, aligning up to next seated surface with min verbal cues. -MET/discontinued   Short term goal 4: Pt to walk with RW 40 ft, CGA to progress to home mobility-MET, see revision   Revised Short-Term Goals:  Short term goals  Short term goal 1: Pt will supine<>sit with supervision in order to get out of bed  Short term goal 2: Pt will sit<>stand with supervision   and RW inorder to transfer surfaces  Short term goal 3: Pt will perform stand/pivot transfer with use of RW, CGA, aligning up to next seated surface with min verbal cues.  Met/discontinued  Short term goal 4: Pt to walk with  ft, SBA to progress to home mobility    Long term goals  Time Frame for Long term goals : no goals set due to short ELOS    Following

## 2021-07-06 NOTE — PROGRESS NOTES
[facility] ICU 4D  Occupational Therapy  Daily Note  Time:   Time In: 1006  Time Out: 1040  Timed Code Treatment Minutes: 34 Minutes  Minutes: 34          Date: 2021  Patient Name: Lyndsey Doty,   Gender: female      Room: [facility]003-A  MRN: [id number]  : 1933  (80 y.o.)  Referring Practitioner: PAMELA Santiago CNP  Diagnosis: Fall at Home  Additional Pertinent Hx: Lyndsey Doty is a 80 y.o. female who presents to the emergency department for evaluation of fall. Patient has had multiple falls over the past couple weeks, refused EMS transfer each time. Today patient had a fall, striking her right temple. Patient is on Plavix. 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 Only pain that she is complaining of is to her right temple. Per MRI: Heterogeneous 5.5 cm nodular peripherally enhancing cystic and solid mass centered at the right occipital/temporal lobe junction with prominent adjacent T2/FLAIR prolongation involving the corpus callosum, temporal, parietal and frontal lobes with prominent mass effect on the right lateral ventricle and 9 mm leftward midline shift. (High grade glioma, glioblastoma multiform). Pt is S/P R craniotomy on 21. Restrictions/Precautions:  Restrictions/Precautions: General Precautions, Fall Risk  Position Activity Restriction  Other position/activity restrictions: L neglect, R artificial eye     SUBJECTIVE: RN approved OT session. Upon entering room pt seated in recliner chair, agreeable to OT session. Pt pleasant and cooperative, however, confused. PAIN: Denies    Vitals: Vitals checked initiating session: all WFL    COGNITION: Decreased Recall, Decreased Insight, Impaired Memory, Decreased Problem Solving and Decreased Safety Awareness    ADL:   Grooming: Minimal Assistance. Pt requires increased assistance to due to decreased vision.  Pt requires increased time to perform oral hygine due to decreased vision and cognition. Pt requires verbal reminders throughout session due to decreased memory. Pt educated regarding left side neglect and educated regarding visual scanning to locate items during grooming. BALANCE:  Sitting Balance:  Contact Guard Assistance. Seated in recliner chair, functional reach outside base of support. BED MOBILITY:  Not Tested    ADDITIONAL ACTIVITIES:  Pt and family member educated throughout session regarding L side neglect, requiring visual scanning techniques. Pt and family member educated on how to perform visual scanning, however, pt does not demonstrate consistent understanding. ASSESSMENT:     Activity Tolerance:  Patient tolerance of  treatment: fair. Pt tolerated session well, however, demonstrates increased confusion throughout the session. Discharge Recommendations: Continue to assess pending progress, IP Rehab, Patient would benefit from continued therapy after discharge Pt would benefit from continued skilled OT services. Equipment Recommendations: Other: continue to assess needs  Plan: Times per week: 6x  Current Treatment Recommendations: Strengthening, Balance Training, Neuromuscular Re-education, Functional Mobility Training, Endurance Training, Home Management Training, Patient/Caregiver Education & Training, Self-Care / ADL, Safety Education & Training    Patient Education  Patient Education: Role of OT, Plan of Care, ADL's and Energy Conservation    Goals  Short term goals  Time Frame for Short term goals: by discharge  Short term goal 1: Pt will complete visual scanning to L side with min cues during ADLs to decrease fall risk during ADLs. Short term goal 2: Pt will complete LB ADL with Morales and adaptive techs to increase indep with self care. Short term goal 3: Pt will complete dynamic standing task with B hand release and SBA x 4 min to increase balance required for ADL completion.   Short term goal 4: Pt will complete functional mobility to/from BR with AD and SBA to increase indep with self care. Following session, patient left in safe position with all fall risk precautions in place.

## 2021-07-06 NOTE — PROGRESS NOTES
Comprehensive Nutrition Assessment    Type and Reason for Visit:  Initial, RD Nutrition Re-Screen/LOS    Nutrition Recommendations/Plan:   ONS initiated: Ensure Enlive TID. Recommend MVI. Continue current diet as ordered. Nutrition Assessment:    Pt. nutritionally compromised AEB s/p craniotomy and wounds. At risk for further nutrition compromise r/t increased nutrient needs for wound healing, admit with fall at home, subarachnoid hemorrhage, right brain mass- metastatic from breast cancer, underlying medical condition (HTN, HLD, GERD). Nutrition recommendations/interventions as per above. Malnutrition Assessment:  Malnutrition Status: At risk for malnutrition (Comment)    Context:  Acute Illness     Findings of the 6 clinical characteristics of malnutrition:  Energy Intake:  1 - 75% or less of estimated energy requirements for 7 or more days  Weight Loss:  No significant weight loss     Body Fat Loss:  Unable to assess     Muscle Mass Loss:  No significant muscle mass loss    Fluid Accumulation:  Unable to assess     Strength:  Not Performed    Estimated Daily Nutrient Needs:  Energy (kcal):  7752-8312 kcals (25-30); Weight Used for Energy Requirements:   (59kgm on 6/30)     Protein (g):  54-90 grams (1.2-2); Weight Used for Protein Requirements:  Ideal (45kgm)          Nutrition Related Findings:   Notified by RN of patient drinks Ensure at home and would like sent with meals. Patient seen- reports fairly good appetite at present, intake close to 100% breakfast, reports since having brain mass appetite/ intake had been poor recently prior to admit and was advised by doctor to drink 3 Ensure daily, intake variable (%) since admit.  Denies difficulty chewing/ swallowing, reports had BM this morning; dexamethasone, colace, humalog, keppra, glycolax; glucose 124, BUN 20, Creatinine 0.4    Wounds:  Surgical Incision (7/2/21: craniotomy and resection of intracranial mass; right arm scab, left arm skin tear, left arm wound, buttocks stage I)       Current Nutrition Therapies:    ADULT DIET; Regular  Adult Oral Nutrition Supplement; Standard High Calorie/High Protein Oral Supplement    Anthropometric Measures:  · Height: 5' (152.4 cm)  · Current Body Weight: 130 lb 11.7 oz (59.3 kg) (6/30; no edema)   · Admission Body Weight: 135 lb 9.3 oz (61.5 kg) (6/28; no edema)    · Usual Body Weight:  (per EMR: 8/12/20 131# 9oz, 3/9/21 128# 8oz, 6/15/21 137# 4oz)     · Ideal Body Weight: 100 lbs;   · BMI: 25.5  · BMI Categories: Overweight (BMI 25.0-29. 9)       Nutrition Diagnosis:   · Increased nutrient needs related to increase demand for energy/nutrients as evidenced by wounds      Nutrition Interventions:   Food and/or Nutrient Delivery:  Continue Current Diet, Start Oral Nutrition Supplement  Nutrition Education/Counseling:  Education initiated (encouraged intake at best effort, use of ONS)   Coordination of Nutrition Care:  Continue to monitor while inpatient    Goals:  Patient will consume 75% or more of meals to aid in healing during LOS. Nutrition Monitoring and Evaluation:   Behavioral-Environmental Outcomes:  None Identified   Food/Nutrient Intake Outcomes:  Food and Nutrient Intake, Supplement Intake  Physical Signs/Symptoms Outcomes:  Biochemical Data, Fluid Status or Edema, Nutrition Focused Physical Findings, Skin, Weight     Discharge Planning:     Too soon to determine     Electronically signed by Eileen Billings RD, LD on 7/6/21 at 2:32 PM EDT    Contact: [phone]

## 2021-07-06 NOTE — CARE COORDINATION
7/6/21, 3:22 PM EDT    DISCHARGE ON GOING EVALUATION    [facility] day: 8  Location: -03/003-A Reason for admit: Fall at home, initial encounter [[id number], J71.072]   Procedure:   6/28 CT Head/Facial bones/Chest/Abd/Pelvis: See injuries  6/28 CT Cervical/Thoracic/Lumbar spine: See injuries  6/28 MRI Brain:  Heterogeneous 5.5 cm nodular peripherally enhancing cystic and solid mass centered at the right occipital/temporal lobe junction with prominent adjacent T2/FLAIR prolongation involving the corpus callosum, temporal, parietal and frontal lobes with prominent mass effect on the right lateral ventricle and 9 mm leftward midline shift. 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  No significant change since yesterday.  Less likely is recent infarct. 7/2 CRANIOTOMY FOR RESECTION/DEBULKING OF RIGHT SIDE INTRA BRAIN TUMOR  7/3 CT Head:  1.  Mild interval decrease in the previously noted pneumocephalus compared    to the prior study. 2.  Postoperative changes are again noted.  Small amount of subarachnoid    blood in association with the right parietal lobe is stable. 3.  Stable white matter edema involving portions of the right cerebral    hemisphere.  There is stable approximately 5-6 mm of right-to-left midline    shift. 7/4 MRI Brain:   1. The patient has undergone interval removal of the large mass in the right posterior temporal lobe. 2. There is a residual 3.9 x 2.1 x 1.7 cm cavity at the surgical site with increased T1 weighted signal intensity suggestive of hemorrhage. 3. There is moderate surrounding edema. 4. There is significantly less mass effect and midline deviation than on previous study. 5. There are probable ischemic changes in the white matter with no evidence of an acute infarct.    6. There are postoperative changes in the right orbit. 7. Otherwise negative MRI scan of the brain with and without intravenous contrast     Barriers to Discharge: POD #4. Oncology and Radiation Oncology consulted today. Afebrile. On room air. NSR. Ox4. Confused at times. Follows commands. PT/OT. Intensivist, Neurosurgery, and Physiatry following. SCDs. Lipitor, IV decadron, prn IV fentanyl, prn IV hydralazine, hydralazine, prn norco, SSI, imdur, IV keppra, synthroid, lidoderm patch, protonix, zoloft. ALT 81, AST 78, hgb 10.5, plt 118. PCP: Alfonzo Jay MD  Readmission Risk Score: 23%  Patient Goals/Plan/Treatment Preferences: From home alone and current with [facility]. SW on case. Plan for IPR if accepted.

## 2021-07-06 NOTE — SIGNIFICANT EVENT
Patient signed out to [address]. Pt moved from [facility] to [address] [place] [address].

## 2021-07-06 NOTE — PROGRESS NOTES
CRITICAL CARE PROGRESS NOTE      Patient:  Kevyn Wallle    Unit/Bed:4D-03/003-A  YOB: 1933  MRN: [id number]   PCP: Zuhair Castro MD  Date of Admission: 6/28/2021  Chief Complaint:-   Chief Complaint   Patient presents with    Trauma    Fall        Assessment and Plan:    1. Fall: History of frequent falls over the past couple months, had not been evaluated for this till arrival.  Found to have intracranial hemorrhage surrounding brain mass. 2. Subarchnoid hemorrhage: Trauma versus hemorrhagic mass. Status post TXA. SBP between 100-160. No fresh blood on recent MRI. 3. 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 Postop day 4 from debulking. Follow-up MRI shows tumor resection, resolution of midline shift, no new bleeding. Seizure prophylaxis, Decadron weaning. PT OT have evaluated. discussed case with Dr. Jonny Mo. potential stepdown today. Oncology consulted. 4. Hypertension: hydralazine, Imdur, Home meds  5. Hx Temporal arteritis: On Decadron. Has prosthetic right eye. 6. HLD: On Lipitor  7. Hypothyroidism : Synthroid ordered  8. Hx of breast cancer: CT abdomen and pelvis not significant for suspicious lesions   9. DVT prophylaxis: Held for #2  10. GI prophylaxis: Protonix 40 daily  11. Nutrition: Tolerating diet well  12. 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 Patient has had frequent falls over the past month, had a fall when trying to go to the bathroom, striking her right forehead on the nightstand. Patient denies loss of consciousness. Unable to get up due to generalized weakness.   Home health provider arrived, called EMS. Patient was level 2 trauma due to Plavix and head trauma. CT found to have spiculated right-sided brain mass, subarachnoid hemorrhage. Admitted to trauma due to element of fall and presence of head bleed. Plan to go to the OR 7/2 for debulking. 7/1 - no complaints. Patient aware of plan to go to the OR tomorrow. Spoke with Dr. Alyssia Duffy, who requested tagged to be done today, 2 units on hold for the OR tomorrow, n.p.o. at midnight. 7/2 -patient had mild nausea overnight, improved after antiemetic. Patient asymptomatic at this time. Patient n.p.o. since midnight. Off anticoagulation. Given Gleodon plan to go to the OR at 1000.     7/5 -postop day 3 from tumor resection. Pathology still pending. Patient tolerating p.o., minimal ambulation. PT OT ordered. Possible stepdown tomorrow. Discussed case with Dr. Destinee Groves    7/6 -patient slept well overnight. Denies any complaints at this time other than mild headache. States that she feels significantly better, no longer having any confusion. Likely stepdown today.     Past Medical History:    Past Medical History:   Diagnosis Date    Anxiety     Blind right eye     Breast cancer metastasized to liver ([facility] [address] 05/10/2016    Liver lesion noted     Carotid stenosis      Left ICA 25%    Colostomy in place [facility]) 05/27/2016    Degenerative arthritis of cervical spine 5/07    GERD (gastroesophageal reflux disease) 11/06    Hypercholesteremia     Hypoestrogenism     Hypothyroidism     Lumbago     Lumbosacral spinal stenosis 05/2019    MDRO (multiple drug resistant organisms) resistance 2008    pt stated cleared    Personal history of cardiac dysrhythmia     Sigmoid diverticulosis 8/04    Spondylolisthesis of lumbosacral region 8/04    Steroid-induced hyperglycemia     Subclavian artery stenosis (HCC)     left    Superior and Inferior Pubic ramus fracture, right, closed, initial encounter ([facility] [address] 05/21/2019    SVT (supraventricular tachycardia) ([facility] [address] 6/07    Temporal arteritis (HCC)     Vertebral artery occlusion     left    Vitamin D deficiency 06/2017      Family History:    Family History   Problem Relation Age of Onset    Cancer Sister 61        breast    Cancer Brother 79        lung    Cancer Brother 68        colon    Cancer Son 48        lung      Social History:    Social History       Tobacco History       Smoking Status  Former Smoker Smoking Frequency  0.5 packs/day Smoking Tobacco Type  Cigarettes      Smokeless Tobacco Use  Never Used              Alcohol History       Alcohol Use Status  No              Drug Use       Drug Use Status  No              Sexual Activity       Sexually Active  Never                 . ROS Review of Systems   Constitutional: Negative for chills, diaphoresis, fatigue and fever. HENT: Negative for rhinorrhea, sore throat and trouble swallowing. Eyes: Negative for photophobia and visual disturbance. Respiratory: Negative for cough, choking, chest tightness, shortness of breath and wheezing. Cardiovascular: Negative for chest pain, palpitations and leg swelling. Gastrointestinal: Negative for abdominal distention, abdominal pain, blood in stool, constipation, diarrhea, nausea and vomiting. Genitourinary: Negative for decreased urine volume, difficulty urinating, dysuria, flank pain, frequency and urgency. Musculoskeletal: Negative for arthralgias, back pain, myalgias, neck pain and neck stiffness. Skin: Negative for color change and rash. Neurological: Positive for headaches (mild). Negative for syncope, weakness, light-headedness and numbness. Hematological: Bruises/bleeds easily.         Scheduled Meds:   hydrALAZINE  25 mg Oral 3 times per day    dexamethasone  3 mg Intravenous Q6H    sodium chloride flush  5-40 mL Intravenous 2 times per day    isosorbide mononitrate  30 mg Oral Daily    levothyroxine  100 mcg Oral Daily    pantoprazole  40 mg Oral QAM AC    sertraline  50 mg Oral BID    atorvastatin  10 mg Oral Nightly    polyethylene glycol  17 g Oral Daily    lidocaine  2 patch Topical Daily    levetiracetam  500 mg Intravenous Q12H    insulin lispro  0-6 Units Subcutaneous TID     insulin lispro  0-3 Units Subcutaneous Nightly    docusate sodium  100 mg Oral BID     Continuous Infusions:   sodium chloride      niCARdipine      dextrose         PHYSICAL EXAMINATION:  Vitals:    07/06/21 0600 07/06/21 0615 07/06/21 0630 07/06/21 0645   BP: (!) 139/48      Pulse: 60 63 63 67   Resp: 15 19 14 17   Temp:       TempSrc:       SpO2: 97% 97% 97% 99%   Weight:       Height:            In: 482   Out: 0      Body mass index is 25.53 kg/m². GCS:   No data recorded         Physical Exam  HENT:      Head: Normocephalic. Right Ear: External ear normal.      Left Ear: External ear normal.      Nose: Nose normal. No rhinorrhea. Mouth/Throat:      Mouth: Mucous membranes are moist.      Pharynx: Oropharynx is clear. Eyes:      Conjunctiva/sclera: Conjunctivae normal.      Comments: Disconjugate gaze, right prosthetic eye. Left eye has very minimal reaction to light   Neck:      Vascular: No carotid bruit. Cardiovascular:      Rate and Rhythm: Normal rate and regular rhythm. Pulses: Normal pulses. Heart sounds: Normal heart sounds. No murmur heard. No gallop. Pulmonary:      Effort: Pulmonary effort is normal. No respiratory distress. Breath sounds: Normal breath sounds. No wheezing or rales. Chest:      Chest wall: No tenderness. Abdominal:      General: Abdomen is flat. Bowel sounds are normal. There is no distension. Palpations: Abdomen is soft. Tenderness: There is no abdominal tenderness. Musculoskeletal:         General: No swelling, tenderness, deformity or signs of injury. Normal range of motion. Cervical back: Normal range of motion and neck supple. Right lower leg: No edema.       Left lower leg: No edema. Comments: Can ambulate short distances with assistance   Skin:     General: Skin is warm and dry. Capillary Refill: Capillary refill takes less than 2 seconds. Findings: Bruising (right eyebrow, periorbital) present. Neurological:      Mental Status: She is alert. Data Review: (All radiographs, tracings, PFTs, and imaging are personally viewed and interpreted unless otherwise noted). CBC:   Recent Labs     07/04/21  0637 07/04/21  0637 07/04/21  1910 07/05/21  0350 07/06/21  0452   WBC 8.5  --   --  10.7 10.2   HGB 9.2*   < > 10.0* 10.9* 10.5*   HCT 30.9*   < > 33.1* 36.6* 35.5*   .0*  --   --  100.5* 99.4*   *  --   --  123* 118*    < > = values in this interval not displayed. BMP:   Recent Labs     07/03/21  1151 07/03/21  1939 07/06/21  0451    138 138   K 3.7 4.2 3.8    104 107   CO2 24 24 24   BUN 14 13 21   CREATININE 0.7 0.6 0.4   GLUCOSE 79 136* 112*       1. CV  1. HR 67  2. /48  3. Telemetry shows normal sinus  4. Hg 10.5  2. Pulm  1. CXR: unremarkable  2. MRI yesterday: Residual 3.9 x 2.1 cm cavity at surgical site some increased signal intensity suggestive of tumor, moderate surrounding edema,  No active bleeding, resolving midline shift  3. CBC   1. WBC: 10.2  2. RBC: 10.5  3. PLT: 118  4. Endocrine         1.  Most recent Glu 112          Seen with multidisciplinary ICU team .  Meets Continued ICU Level Care Criteria:    [] Yes   [x] No - Transfer Planned to listed location: 4A  [] HOSPITALIST CONTACTED-      Case and plan discussed with Dr. Perla Horan          Electronically signed by Phil Rankin MD on 7/6/2021 at 7:10 AM

## 2021-07-06 NOTE — PROGRESS NOTES
Psychiatric/Behavioral: Negative for agitation, behavioral problems, confusion and decreased concentration. 24 hour intake/output:    Intake/Output Summary (Last 24 hours) at 7/6/2021 0752  Last data filed at 7/6/2021 0430  Gross per 24 hour   Intake 1095 ml   Output 1000 ml   Net 95 ml     Last 3 weights: Wt Readings from Last 3 Encounters:   06/30/21 130 lb 11.7 oz (59.3 kg)   06/15/21 137 lb 4 oz (62.3 kg)   05/25/21 136 lb (61.7 kg)         CBC:   Recent Labs     07/04/21  0637 07/04/21  0637 07/04/21  1910 07/05/21  0350 07/06/21  0452   WBC 8.5  --   --  10.7 10.2   HGB 9.2*   < > 10.0* 10.9* 10.5*   *  --   --  123* 118*    < > = values in this interval not displayed. BMP:    Recent Labs     07/03/21  1939 07/06/21  0451 07/06/21  0715    138 141   K 4.2 3.8 4.0    107 107   CO2 24 24 26   BUN 13 21 20   CREATININE 0.6 0.4 0.4   GLUCOSE 136* 112* 90     Calcium:  Recent Labs     07/06/21  0715   CALCIUM 8.9     Magnesium:No results for input(s): MG in the last 72 hours. Glucose:  Recent Labs     07/05/21  1330 07/05/21  1659 07/05/21  2032   POCGLU 194* 98 161*     HgbA1C: No results for input(s): LABA1C in the last 72 hours. Lipids: No results for input(s): CHOL, TRIG, HDL, LDLCALC in the last 72 hours. Invalid input(s): LDL    Radiology reports as per the Radiologist  Radiology: CT HEAD WO CONTRAST    Result Date: 7/3/2021  CT SCAN OF THE BRAIN WITHOUT CONTRAST COMPARISON: 7/2/2021. TECHNIQUE: A noncontrast CT scan of the brain was performed. A dose reduction technique was utilized. FINDINGS: Right sided craniotomy changes are noted, with underlying postsurgical changes involving the right parietal lobe. Previously noted pneumocephalus is mildly decreased compared to the prior study. Previously noted small amount of subarachnoid blood in association with the right parietal lobe is stable.  Stable white matter edema is noted involving the right parietal lobe, and dorsal aspect of the right frontal lobe, the right temporal lobe, the posterior limb of the right internal capsule, and a portion of the right external capsule. At the level of the septum pellucidum, there is estimated to be approximately 5-6 mm of right-to-left midline shift. This is stable. There is no hydrocephalus or transtentorial herniation. 1.  Mild interval decrease in the previously noted pneumocephalus compared to the prior study. 2.  Postoperative changes are again noted. Small amount of subarachnoid blood in association with the right parietal lobe is stable. 3.  Stable white matter edema involving portions of the right cerebral hemisphere. There is stable approximately 5-6 mm of right-to-left midline shift. This document has been electronically signed by: Nahomi Olivares M.D. on 07/03/2021 05:52 AM All CTs at this facility use dose modulation techniques and iterative reconstructions, and/or weight-based dosing when appropriate to reduce radiation to a low as reasonably achievable. CT HEAD WO CONTRAST    Result Date: 7/2/2021  CT head without contrast Comparison:  CT,SR  - CT HEAD WO CONTRAST  - 06/29/2021 05:27 AM EDT Findings: Recent surgical changes of partial right parietal resection with overlying craniotomy and scalp staples. Mild postsurgical subarachnoid hemorrhage in the right parietal sulci and moderate right greater than left postsurgical pneumocephalus. Stable white matter vasogenic edema in the right cerebral hemisphere with sparing in the right frontal region. There is similar 5.4 mm leftward midline shift. The visualized paranasal sinuses and mastoid air cells are normal. The orbits are within normal limits. No skull fracture. 1.  Recent partial right parietal resection with small amount of postsurgical subarachnoid hemorrhage, and right greater than left postsurgical pneumocephalus. This may be followed up for resolution.  2.  Stable white matter vasogenic edema in the right cerebral hemisphere with sparing of the right frontal region. Stable 5.4 mm leftward midline shift. This document has been electronically signed by: Ulysses Xavier MD on 07/02/2021 07:36 PM All CTs at this facility use dose modulation techniques and iterative reconstructions, and/or weight-based dosing when appropriate to reduce radiation to a low as reasonably achievable. CT HEAD WO CONTRAST    Result Date: 6/29/2021  EXAM:  CT HEAD WITHOUT CONTRAST: COMPARISON:  CT,KOSR  - CT HEAD WO CONTRAST  - 06/28/2021 09:18 AM EDT  CT,SR  - CT HEAD WO CONTRAST  - 09/08/2019 04:22 PM EDT TECHNIQUE:  Helical noncontrast axial images from base of skull to vertex, with coronal and sagittal reformats. FINDINGS: No significant change since yesterday. There is effacement of sulci throughout the right temporal and parietal lobes, occipital lobe and posterior right frontal lobe, with extensive low-attenuation prominence of white matter, extending into subcortical regions, and discontinuous calcification in prior to occipital cortex. Low-attenuation extends into the right external and internal capsules. The right lateral ventricle is partially effaced. There is approximately 8 mm of midline shift, not significantly changed. No transtentorial herniation. Possibility of a small amount of cortical hemorrhage, in addition to the apparent calcifications, unchanged. No new hemorrhage. The left hemisphere is grossly unremarkable. No brainstem or cerebellar abnormality is appreciated. Sinuses and mastoid air cells are clear. No calvarial or skull base lesion. Extensive right hemispheric abnormality is concerning for tumor, with mass effect. No significant change since yesterday. Less likely is recent infarct.  This document has been electronically signed by: Kevin Nunez MD on 06/29/2021 08:15 AM All CTs at this facility use dose modulation techniques and iterative reconstructions, and/or weight-based dosing when appropriate to reduce radiation to a low as reasonably achievable. CT HEAD WO CONTRAST    Result Date: 6/28/2021  PROCEDURE: CT HEAD WO CONTRAST CLINICAL INFORMATION: fal . COMPARISON: 9/8/2019 TECHNIQUE: 2-D multiplanar noncontrast images of the brain All CT scans at this facility use dose modulation, iterative reconstruction, and/or weight-based dosing when appropriate to reduce radiation dose to as low as reasonably achievable. FINDINGS: There is heterogeneous low-density mass in the right parieto-occipital lobe image 21 with a small amount of parenchymal hemorrhage. There is small amount of subarachnoid hemorrhage associated with this. There is extensive white matter edema involving most of the right hemisphere extending into the temporal lobe. This results in compression of the right lateral ventricle and midline shift of 8 mm. There is slight mass effect on the right very subtle mass effect on the right basal cistern. Quadrigeminal cistern is intact. Calvarium is intact. Right globe prosthesis is present. Visualized paranasal sinuses and mastoid air cells are clear     Mixed density mass in the right parietal occipital lobe with extensive surrounding white matter edema and minimal parenchymal and subarachnoid hemorrhage. There is midline shift 8 mm left to right. Compression of the right lateral ventricle. Minimal mass effect on the basilar cistern. This was called to Dr. Rob Silva at time of exam. **This report has been created using voice recognition software. It may contain minor errors which are inherent in voice recognition technology. ** Final report electronically signed by Dr. Alexia Orta on 6/28/2021 9:56 AM    CT FACIAL BONES WO CONTRAST    Result Date: 6/28/2021  PROCEDURE: CT FACIAL BONES WO CONTRAST CLINICAL INFORMATION: tr. Fall with right-sided pain and bruising. COMPARISON: CT head dated 9/8/2019 and CT facial bones dated 8/20/2017. TECHNIQUE: Helical CT of the face with sagittal and coronal reconstructions. All CT scans at this facility use dose modulation, iterative reconstruction, and/or weight-based dosing when appropriate to reduce radiation dose to as low as reasonably achievable. FINDINGS:  Maxillofacial bones are normally aligned without visualized fracture. The mandible appears intact. Patient is edentulous. There is rightward deviation of the nasal septum, stable compared to prior exams. There is a right-sided globe prosthesis, stable compared to prior exam. Paranasal sinuses are clear. Mastoid air cells and middle ears are clear. No evidence of acute osseous injury of the face. **This report has been created using voice recognition software. It may contain minor errors which are inherent in voice recognition technology. ** Final report electronically signed by Dr. Ameena Will MD on 6/28/2021 10:04 AM    CT CHEST W CONTRAST    Result Date: 6/28/2021  PROCEDURE: CT CHEST W CONTRAST CLINICAL INFORMATION: tr COMPARISON: 8/26/2016 TECHNIQUE:  Axial CT images were obtained through the chest following the intravenous and demonstration of 80 mL Isovue 370 contrast. Coronal and sagittal reformatted images were rendered. All CT scans at this facility use dose modulation, iterative reconstruction, and/or weight-based dosing when appropriate to reduce radiation dose to as low as reasonably achievable. FINDINGS: The central airways appear patent. There is a 3 mm pulmonary nodule within the left lower lobe demonstrated on axial image 58. This appears unchanged from 8/26/2016 and likely represents a poorly calcified granuloma given its stability. No pneumothorax or pleural effusion is seen. No mediastinal hematoma is demonstrated. CT of the abdomen and pelvis are reported separately. No acute osseous findings are demonstrated. 1. No acute traumatic intrathoracic findings. **This report has been created using voice recognition software.   It may contain minor errors which are inherent in voice recognition technology. ** Final report electronically signed by Dr. Nilam Sparrow on 6/28/2021 10:00 AM    CT CERVICAL SPINE WO CONTRAST    Result Date: 6/28/2021  PROCEDURE: CT CERVICAL SPINE WO CONTRAST CLINICAL INFORMATION: tr. Right-sided neck pain and bruising after fall. COMPARISON: CT cervical spine dated 9/8/2019. TECHNIQUE: 3 mm noncontrast axial images were obtained through the cervical spine with sagittal and coronal reconstructions. All CT scans at this facility use dose modulation, iterative reconstruction, and/or weight-based dosing when appropriate to reduce radiation dose to as low as reasonably achievable. FINDINGS: There is a dextrocurvature of the cervical spine which is likely positional.  There is grade 1 anterolisthesis of C7 relative to T1 on the basis of degenerative change. There is stable mild irregularity of the superior endplate of C7 likely associated with Schmorl's node. There is otherwise anatomic vertebral body height and alignment. No fracture of the cervical vertebral column is identified. The craniocervical junction appears intact. No paraspinal or epidural fluid collection is identified. Paraspinal soft tissues are grossly unremarkable. There are stable mild degenerative changes of the cervical spine. No evidence of acute osseous injury of the cervical spine. **This report has been created using voice recognition software. It may contain minor errors which are inherent in voice recognition technology. ** Final report electronically signed by Dr. Gautam Valerio MD on 6/28/2021 9:54 AM    MRI BRAIN W CONTRAST    Result Date: 7/1/2021  PROCEDURE: MRI BRAIN W CONTRAST CLINICAL INFORMATION: Ranger protocol . COMPARISON: MR brain dated 6/28/2021. TECHNIQUE: Axial 3-D T1 weighted radha protocol images were obtained through the brain before and after the administration of 10 mL ProHance injected in the right forearm. Fiducial markers are in place.  Sagittal and coronal reconstructions were created. FINDINGS: Redemonstration of a multilobulated T1 hypointense mass centered at the temporal occipital junction on the right with prominent nodular and irregular curvilinear peripheral enhancement along with thickened enhancing septations measuring 5.5 x 3.8 cm in greatest axial dimensions, not significantly changed compared to prior MRI. There is prominent adjacent hypointense T1 signal. Redemonstration of prominent mass effect on the atrium and occipital horn of the right lateral ventricle with approximately 1 cm leftward midline shift, stable compared to prior exam.      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 **This report has been created using voice recognition software. It may contain minor errors which are inherent in voice recognition technology. ** Final report electronically signed by Dr. Austin Ruiz MD on 7/1/2021 10:12 AM    CT ABDOMEN PELVIS W IV CONTRAST Additional Contrast? None    Result Date: 6/28/2021  PROCEDURE: CT ABDOMEN PELVIS W IV CONTRAST CLINICAL INFORMATION: tr . COMPARISON: 8/12/2020 TECHNIQUE: 2-D multiplanar postcontrast images of the abdomen and pelvis Isovue-370 IV contrast. All CT scans at this facility use dose modulation, iterative reconstruction, and/or weight-based dosing when appropriate to reduce radiation dose to as low as reasonably achievable. FINDINGS: No solid organ injury. Liver and spleen are within normal limits. There may be early arterial enhancement causing heterogeneous enhancement of the spleen. Appearance of the liver also suggests early arterial enhancement. Pancreas is unremarkable. Adrenals are normal. Stable cyst lower pole left kidney. Kidneys enhance symmetrically and appear otherwise normal. Heavy atherosclerosis of the aorta. Right common iliac artery stent. No adenopathy.  Left anterior abdominal hernia containing colon and small bowel. No obstruction at this time. Postsurgical changes right colon near the cecum. Urinary bladder is distended. Uterus is present. Postsurgical changes rectosigmoid colon. Bones 9 mm anterolisthesis L5 on S1. Degenerative disc disease. No acute or suspicious bone lesions identified. Remote healed fracture of the right superior and inferior pubic rami. Multiple chronic findings. No acute abdominal or pelvic abnormality. **This report has been created using voice recognition software. It may contain minor errors which are inherent in voice recognition technology. ** Final report electronically signed by Dr. Carson Dorsey on 6/28/2021 10:03 AM    US LIVER    Result Date: 6/29/2021  Ultrasound of the right upper quadrant Comparison:  US,SR  - US LIVER  - 05/26/2020 10:50 AM EDT Findings: Normal liver. No evidence for mass. Liver measures 14.4 cm. Status post cholecystectomy. Normal common bile duct at 5 mm. Normal visualized portion of the pancreas. Mild right hydronephrosis. No renal mass. Impression: Mild right hydronephrosis. This document has been electronically signed by: Jv Flores MD on 06/29/2021 01:28 AM    XR CHEST 1 VIEW    Result Date: 7/1/2021  PROCEDURE: XR CHEST 1 VIEW CLINICAL INFORMATION: preop. COMPARISON: Chest x-ray dated 4 May 2021. TECHNIQUE: AP upright view of the chest. FINDINGS: There is borderline cardiomegaly. There is atherosclerotic calcification in the aortic arch. There is a stent at the origin of the left subclavian artery. There is slightly increased density in the right paratracheal space. There are no pulmonary infiltrates or effusions. The pulmonary vascularity is normal. There are degenerative changes involving the acromioclavicular joints bilaterally and glenohumeral joints. .     1. No interval change since previous study dated for May 2021, no acute cardiopulmonary disease.  **This report has been created using voice recognition software. It may contain minor errors which are inherent in voice recognition technology. ** Final report electronically signed by DR Farida Rubalcava on 7/1/2021 10:24 AM    CT LUMBAR RECONSTRUCTION WO POST PROCESS    Result Date: 6/28/2021  PROCEDURE: CT LUMBAR RECONSTRUCTION WO POST PROCESS CLINICAL INFORMATION: fall. Right-sided pain and bruising. COMPARISON: Cervical spine radiographs dated 3/16/2021. TECHNIQUE: 3 mm axial, sagittal and coronal CT images were reconstructed through the lumbar spine. These are reformatted from the patient's abdomen and pelvis CT. IV contrast is present. All CT scans at this facility use dose modulation, iterative reconstruction, and/or weight-based dosing when appropriate to reduce radiation dose to as low as reasonably achievable. FINDINGS: There are chronic bilateral pars defects at L5 with grade 2 anterolisthesis of L5 relative to S1, stable compared to prior radiographs. There is grade 1 retrolisthesis of L1 relative to L2 on the basis of degenerative change, also stable compared to prior radiographs. There is a minimal levocurvature of the lumbar spine, unchanged compared to prior contrast. There is otherwise anatomic vertebral body height and alignment. No acute fracture of the lumbar vertebral column is identified. At T12-L1 there is a minimal disc bulge without significant spinal canal or neuroforaminal stenosis. At L1-2 there is partial uncovering the disc without significant spinal canal stenosis and mild left and mild-to-moderate right neural foraminal stenosis in association with facet hypertrophy. At L2-3 there is a shallow disc bulge which contributes to mild spinal canal stenosis and mild left and moderate right neuroforaminal stenosis in association with facet hypertrophy. At L3-4 there is a shallow disc bulge without significant spinal canal stenosis. There is mild bilateral foraminal stenosis in association with facet hypertrophy.  At L4-5 there is no significant spinal canal stenosis. There is mild to moderate bilateral foraminal stenosis in association with shallow disc bulge and facet hypertrophy. At L5-S1 there is partial uncovering of the disc. There is moderate to severe bilateral neural foraminal stenosis from flattening of the neural foramina and uncovering of the disc associated with the anterolisthesis. 1. No evidence of acute osseous injury of the lumbar spine. 2. 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 3. Multilevel degenerative changes elsewhere. Please refer to the body of the report for segmental analysis. **This report has been created using voice recognition software. It may contain minor errors which are inherent in voice recognition technology. ** Final report electronically signed by Dr. Carlee Colin MD on 6/28/2021 10:00 AM    CT THORACIC RECONSTRUCTION WO POST PROCESS    Result Date: 6/28/2021  PROCEDURE: CT THORACIC RECONSTRUCTION WO POST PROCESS CLINICAL INFORMATION: Trauma . COMPARISON: No prior exam TECHNIQUE: 2-D multiplanar reconstructed images of the thoracic spine. All CT scans at this facility use dose modulation, iterative reconstruction, and/or weight-based dosing when appropriate to reduce radiation dose to as low as reasonably achievable. FINDINGS: Increased thoracic kyphosis. No acute fracture or acute bony malalignment. Diffuse osteopenia. Diffuse degenerative disc disease. No central canal encroachment. Dextroscoliosis. No foraminal encroachment or paraspinal soft tissue abnormality. No acute abnormality of the thoracic spine. **This report has been created using voice recognition software. It may contain minor errors which are inherent in voice recognition technology. ** Final report electronically signed by Dr. Rakan Bang on 6/28/2021 10:06 AM    MRI BRAIN W WO CONTRAST    Addendum Date: 7/5/2021 PROCEDURE: MRI BRAIN W WO CONTRAST CLINICAL INFORMATIONPost craniotomy and resection of tumor. COMPARISON: CT scan of the brain dated 3 July 2021. [facility] MRI scan of the brain dated July 1, 2021. TECHNIQUE: Multiplanar and multiple spin echo T1 and T2-weighted images were obtained through the brain before and after the administration of intravenous contrast. FINDINGS: The patient has undergone interval surgery and removal of the large mass in the right posterior temporal lobe is a residual 3.9 x 3.1 x 1.7 cm in the right posterior temporal lobe with increased T1 weighted signal intensity which may represent hemorrhage. There is moderate surrounding edema. There is mass effect significantly less marked than on previous study dated July 1, 2021. There is increased signal intensity in the white matter most likely representing ischemic changes The diffusion-weighted images are normal. The brain volume is normal.There are no intra-or extra-axial collections. On the FLAIR and T2-weighted sequences, there is normal signal intensity in the brain. On the gradient echo T2-weighted images, there is mineralization in the medial aspects of the basal ganglia. There is no abnormal enhancement in the brain. The major intracranial vascular flow voids are present. The midline craniocervical junction structures are normal.  The brainstem and pituitary gland are normal. There are postoperative changes in the right orbit. Result Date: 7/5/2021  PROCEDURE: MRI BRAIN W WO CONTRAST CLINICAL INFORMATIONPost craniotomy and resection of tumor. COMPARISON: CT scan of the brain dated 3 July 2021. [facility] MRI scan of the brain dated July 1, 2021.  TECHNIQUE: Multiplanar and multiple spin echo T1 and T2-weighted images were obtained through the brain before and after the administration of intravenous contrast. FINDINGS: The patient has undergone interval surgery and removal of the large mass in the right posterior temporal lobe is a residual 3.9 x 3.1 x 1.7 cm in the right posterior temporal lobe with increased T1 weighted signal intensity which may represent hemorrhage. There is moderate surrounding edema. There is mass effect significantly less marked than on previous study dated July 1, 2021. There is increased signal intensity in the white matter most likely representing ischemic changes The diffusion-weighted images are normal. The brain volume is normal.There are no intra-or extra-axial collections. On the FLAIR and T2-weighted sequences, there is normal signal intensity in the brain. On the gradient echo T2-weighted images, there is mineralization in the medial aspects of the basal ganglia. There is no abnormal enhancement in the brain. The major intracranial vascular flow voids are present. The midline craniocervical junction structures are normal.  The brainstem and pituitary gland are normal. There are postoperative changes in the right orbit. 1. The patient has undergone interval removal of the large mass in the right posterior temporal lobe. 2. There is a residual 3.9 x 2.1 x 1.7 cm cavity at the surgical site with increased T1 weighted signal intensity suggestive of hemorrhage. 3. There is moderate surrounding edema. 4. There is significantly less mass effect and midline deviation than on previous study. 5. There are probable ischemic changes in the white matter with no evidence of an acute infarct. 6. There are postoperative changes in the right orbit. 7. Otherwise negative MRI scan of the brain with and without intravenous contrast. **This report has been created using voice recognition software. It may contain minor errors which are inherent in voice recognition technology. ** Final report electronically signed by DR Sarah Dougherty on 7/4/2021 1:35 PM    MRI BRAIN W WO CONTRAST    Result Date: 6/28/2021  PROCEDURE: MRI BRAIN W WO CONTRAST INDICATION:brain mass. Fall today. COMPARISON: CT head from the same date and MR brain dated 10/23/2009. TECHNIQUE: Multiplanar and multiple spin echo T1 and T2-weighted images were obtained through the brain before and after the administration of intravenous contrast. 15 mL ProHance was injected in the right AC. FINDINGS: There is an intra-axial multilobulated mass centered at the right occipital/temporal lobe junction measuring 5.5 x 3.9 cm in greatest axial dimensions. There are lobulated low T1, hyperintense T2 signal components centrally. There is thickened and nodular irregular peripheral enhancement following contrast administration. There are nodular enhancing septations centrally and inferiorly within the lesion. No other enhancing lesions are identified within the parenchyma. There is prominent T2/FLAIR prolongation adjacent to the lesion extending anteriorly into the temporal, parietal and frontal lobes and in the right basal ganglia, as well as the splenium of the corpus callosum. There is prominent mass effect on the occipital horn of the right lateral ventricle with near complete effacement. There is 9 millimeters leftward midline shift at the level of the foramen of [place]. There are moderate confluent and patchy areas of T2/FLAIR prolongation in the periventricular, subcortical deep white matter elsewhere. No focal areas of restricted diffusion are present. The major vascular flow voids appear patent. There is artifact from patient's right ocular implant. Patient is status post lens extraction on the left. Orbits are otherwise unremarkable. Paranasal sinuses and mastoid air cells are clear. Heterogeneous 5.5 cm nodular peripherally enhancing cystic and solid mass centered at the right occipital/temporal lobe junction with prominent adjacent T2/FLAIR prolongation involving the corpus callosum, temporal, parietal and frontal lobes with prominent mass effect on the right lateral ventricle and 9 mm leftward midline shift. 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  **This report has been created using voice recognition software. It may contain minor errors which are inherent in voice recognition technology. ** Final report electronically signed by Dr. Devyn Lozoya MD on 6/28/2021 12:45 PM    A/P: S/P the patient is seen and evaluated in the ICU setting with the valuation exam findings reviewed and discussed with Dr. Chantell Sherman and with nursing. Patient is resting comfortably postoperative day #4 from craniotomy and resection of intracranial mass performed by Dr. Chantell Sherman, without complication. She remains stable and intact to her baseline on exam this morning with no additional changes noted to the patient's chart overnight. Neurosurgery recommends PT and OT as tolerated with a transition from the ICU setting to the fluoroscopy space is available and additional criteria are met. I consult for evaluation for inpatient rehabilitation placement has been made.   Neurosurgery to follow    Electronically signed by Jude Salazar PA-C on 7/6/2021 at 7:52 AM

## 2021-07-06 NOTE — CARE COORDINATION
7/6/21, 10:44 AM EDT    DISCHARGE PLANNING EVALUATION    Received a call from [address] [place] [address]. Updated her on patient condition and discharge plan.

## 2021-07-06 NOTE — PROGRESS NOTES
Physical Medicine & Rehabilitation   Progress Note    Chief Complaint:  Brain mass. Rehab needs    Subjective:  Patient seen in her bed in ICU. Order placed for transfer to step down but beds are limited. Discussed with patient about discharge planning. Patient states her brother is returning to [place] but not sure when. Patient states she has nieces that live in the area but help would be limited. Discussed with patient about discharge planning, IPR vs SNF. Would need to know the plan for her care as OP if able to go to [facility]. Discussed if brother could stay with her short term or if she would be able to go stay with him or a niece in [place]. Patient states she would prefer to go home and have someone stay with her if possible. Rehabilitation:  PT:  Bed Mobility:  Sit to Supine: Stand By Assistance, with head of bed flat, with rail, with verbal cues , with increased time for completion   Scooting: Stand By Assistance, X 1, with head of bed flat, with rail, with increased time for completion  Transfers:  Sit to Stand: [employer], with increased time for completion, cues for hand placement  Stand to [place] [id number], with increased time for completion, cues for hand placement  Ambulation:  Contact Guard Assistance, with cues for safety, with increased time for completion. Pt needed cues to bring RW initially, however did not need cues after initial cues. Distance: 50 feet   Surface: Level Tile  Device:Rolling Walker  Gait Deviations: Forward Flexed Posture, Decreased Gait Speed and Mild Path Deviations  Balance:  Static Sitting Balance:  Supervision  Dynamic Sitting Balance: Stand By Assistance  Static Standing Balance: Stand By Assistance, with cues for safety  Exercise:  Patient was guided in 1 set(s) 10 reps of exercise to both lower extremities. Heelslides, Short arc quads, Hip abduction/adduction, Straight leg raises, Seated marches, Seated heel/toe raises and Long arc quads. Exercises were completed for increased independence with functional mobility. OT:   COGNITION: Decreased Recall, Decreased Insight, Impaired Memory, Decreased Problem Solving and Decreased Safety Awareness  ADL:   Grooming: Minimal Assistance. Pt requires increased assistance to due to decreased vision. Pt requires increased time to perform oral hygine due to decreased vision and cognition. Pt requires verbal reminders throughout session due to decreased memory. Pt educated regarding left side neglect and educated regarding visual scanning to locate items during grooming. BALANCE:  Sitting Balance:  Contact Guard Assistance. Seated in recliner chair, functional reach outside base of support. ADDITIONAL ACTIVITIES:  Pt and family member educated throughout session regarding L side neglect, requiring visual scanning techniques. Pt and family member educated on how to perform visual scanning, however, pt does not demonstrate consistent understanding. ST: Reviewed.       Review of Systems:  CONSTITUTIONAL:  positive for  fatigue  EYES: blind right eye, cataracts left  HEENT:  negative  RESPIRATORY:  negative  CARDIOVASCULAR:  negative  GASTROINTESTINAL:  Negative, + BM yesterday  GENITOURINARY:  garcia  SKIN:  Bruising, right facial  HEMATOLOGIC/LYMPHATIC:  positive for swelling/edema  MUSCULOSKELETAL:  positive for  muscle weakness  NEUROLOGICAL:  positive for gait problems and weakness  BEHAVIOR/PSYCH:  negative  System review otherwise negative      Objective:  /81   Pulse 78   Temp 98.6 °F (37 °C) (Oral)   Resp 24   Ht 5' (1.524 m)   Wt 130 lb 11.7 oz (59.3 kg)   LMP  (LMP Unknown)   SpO2 97%   BMI 25.53 kg/m²   awake  Orientation:   person, place, time  Mood: within normal limits  Affect: calm  General appearance: Patient is well nourished, well developed, well groomed and in no acute distress     Memory:   normal,   Attention/Concentration: normal  Language:  normal     Cranial Nerves: 9.4 - 12.4 fL   Comprehensive Metabolic Panel w/ Reflex to MG    Collection Time: 07/06/21  7:15 AM   Result Value Ref Range    Glucose 90 70 - 108 mg/dL    CREATININE 0.4 0.4 - 1.2 mg/dL    BUN 20 7 - 22 mg/dL    Sodium 141 135 - 145 meq/L    Potassium reflex Magnesium 4.0 3.5 - 5.2 meq/L    Chloride 107 98 - 111 meq/L    CO2 26 23 - 33 meq/L    Calcium 8.9 8.5 - 10.5 mg/dL    AST 78 (H) 5 - 40 U/L    Alkaline Phosphatase 53 38 - 126 U/L    Total Protein 5.8 (L) 6.1 - 8.0 g/dL    Albumin 4.0 3.5 - 5.1 g/dL    Total Bilirubin 0.5 0.3 - 1.2 mg/dL    ALT 81 (H) 11 - 66 U/L   Anion Gap    Collection Time: 07/06/21  7:15 AM   Result Value Ref Range    Anion Gap 8.0 8.0 - 16.0 meq/L   Glomerular Filtration Rate, Estimated    Collection Time: 07/06/21  7:15 AM   Result Value Ref Range    Est, Glom Filt Rate >90 ml/min/1.73m2   POCT glucose    Collection Time: 07/06/21  9:00 AM   Result Value Ref Range    POC Glucose 124 (H) 70 - 108 mg/dl   POCT glucose    Collection Time: 07/06/21 12:23 PM   Result Value Ref Range    POC Glucose 101 70 - 108 mg/dl       Impression:  · 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 Patient unsure at this time about 24 hour assistance, states she will talk to her brother about possibly staying with her as well, possibly after IPR stay. Will need plan in place for IPR. Also discussed with patient about if ongoing treatment needed, whether moving in with a family member maybe needed. Patient would prefer someone come stay with her if possible.    · Patient would prefer DR Juwan Green as her oncologist if/when needed after biopsy returns.    · Patient would be a candidate for IPR, await plan for assistance from family at discharge    Missed Therapy Time:  · None    Annamaria Watson, PAMELA - CNP

## 2021-07-06 NOTE — FLOWSHEET NOTE
07/06/21 1002   Encounter Summary   Services provided to: Patient   Referral/Consult From: Rounding   Continue Visiting Yes  (7/6)   Complexity of Encounter Low   Length of Encounter 15 minutes   Routine   Type Follow up   Assessment Approachable   Intervention Nurtured hope   Outcome Comfort   Assessment: In my encounter with the 80 yr old patient, while rounding  the unit 4D,  I provided spiritual care to patient through conversation, I also came to assess the patients spiritual needs present. The pt was sitting in the chair. The pt was admitted due to a fall. Interventions:  I provided prayer, emotional support and words of comfort.  provided a listening presence and encouraged pt to share their beliefs and how they support him during their hospitalization. Outcomes: The patient was encouraged and didnt share any further spiritual needs at this time. The pt remains optimistic and hopeful. The pt shared that they were appreciative for the support. Plan:  Chaplains will follow-up at a later time for assessment of any spiritual care needs present. The pt is supported by [facility].

## 2021-07-07 ENCOUNTER — APPOINTMENT (OUTPATIENT)
Dept: CT IMAGING | Age: 86
DRG: 025 | End: 2021-07-07
Payer: MEDICARE

## 2021-07-07 ENCOUNTER — HOSPITAL ENCOUNTER (OUTPATIENT)
Dept: RADIATION ONCOLOGY | Age: 86
Discharge: HOME | End: 2021-07-07
Payer: MEDICARE

## 2021-07-07 PROBLEM — R11.10 ABDOMINAL PAIN, VOMITING, AND DIARRHEA: Status: RESOLVED | Noted: 2020-08-13 | Resolved: 2021-07-07

## 2021-07-07 PROBLEM — R10.9 ABDOMINAL PAIN: Status: RESOLVED | Noted: 2020-08-12 | Resolved: 2021-07-07

## 2021-07-07 PROBLEM — R19.7 ABDOMINAL PAIN, VOMITING, AND DIARRHEA: Status: RESOLVED | Noted: 2020-08-13 | Resolved: 2021-07-07

## 2021-07-07 PROBLEM — Z98.890 STATUS POST CRANIOTOMY: Status: ACTIVE | Noted: 2021-07-02

## 2021-07-07 PROBLEM — C79.31 BREAST CANCER METASTASIZED TO BRAIN, RIGHT (HCC): Status: ACTIVE | Noted: 2021-07-06

## 2021-07-07 PROBLEM — K56.609 SBO (SMALL BOWEL OBSTRUCTION) (HCC): Status: RESOLVED | Noted: 2018-12-23 | Resolved: 2021-07-07

## 2021-07-07 PROBLEM — R10.9 ABDOMINAL PAIN, VOMITING, AND DIARRHEA: Status: RESOLVED | Noted: 2020-08-13 | Resolved: 2021-07-07

## 2021-07-07 PROBLEM — C50.911 BREAST CANCER METASTASIZED TO BRAIN, RIGHT (HCC): Status: ACTIVE | Noted: 2021-07-06

## 2021-07-07 LAB
ERYTHROCYTE [DISTWIDTH] IN BLOOD BY AUTOMATED COUNT: 14.1 % (ref 11.5–14.5)
ERYTHROCYTE [DISTWIDTH] IN BLOOD BY AUTOMATED COUNT: 51.2 FL (ref 35–45)
GLUCOSE BLD-MCNC: 133 MG/DL (ref 70–108)
GLUCOSE BLD-MCNC: 304 MG/DL (ref 70–108)
GLUCOSE BLD-MCNC: 91 MG/DL (ref 70–108)
HCT VFR BLD CALC: 36.8 % (ref 37–47)
HEMOGLOBIN: 11.2 GM/DL (ref 12–16)
MCH RBC QN AUTO: 30.1 PG (ref 26–33)
MCHC RBC AUTO-ENTMCNC: 30.4 GM/DL (ref 32.2–35.5)
MCV RBC AUTO: 98.9 FL (ref 81–99)
PLATELET # BLD: 136 THOU/MM3 (ref 130–400)
PMV BLD AUTO: 12.1 FL (ref 9.4–12.4)
RBC # BLD: 3.72 MILL/MM3 (ref 4.2–5.4)
WBC # BLD: 10 THOU/MM3 (ref 4.8–10.8)

## 2021-07-07 PROCEDURE — 2580000003 HC RX 258: Performed by: PHYSICIAN ASSISTANT

## 2021-07-07 PROCEDURE — 6370000000 HC RX 637 (ALT 250 FOR IP): Performed by: STUDENT IN AN ORGANIZED HEALTH CARE EDUCATION/TRAINING PROGRAM

## 2021-07-07 PROCEDURE — 97129 THER IVNTJ 1ST 15 MIN: CPT

## 2021-07-07 PROCEDURE — 82948 REAGENT STRIP/BLOOD GLUCOSE: CPT

## 2021-07-07 PROCEDURE — 99024 POSTOP FOLLOW-UP VISIT: CPT | Performed by: NEUROLOGICAL SURGERY

## 2021-07-07 PROCEDURE — 2060000000 HC ICU INTERMEDIATE R&B

## 2021-07-07 PROCEDURE — 6360000002 HC RX W HCPCS: Performed by: PHYSICIAN ASSISTANT

## 2021-07-07 PROCEDURE — APPSS30 APP SPLIT SHARED TIME 16-30 MINUTES: Performed by: PHYSICIAN ASSISTANT

## 2021-07-07 PROCEDURE — 85027 COMPLETE CBC AUTOMATED: CPT

## 2021-07-07 PROCEDURE — 36415 COLL VENOUS BLD VENIPUNCTURE: CPT

## 2021-07-07 PROCEDURE — 97116 GAIT TRAINING THERAPY: CPT

## 2021-07-07 PROCEDURE — 6370000000 HC RX 637 (ALT 250 FOR IP): Performed by: PHYSICIAN ASSISTANT

## 2021-07-07 PROCEDURE — 97110 THERAPEUTIC EXERCISES: CPT

## 2021-07-07 PROCEDURE — 6360000002 HC RX W HCPCS: Performed by: STUDENT IN AN ORGANIZED HEALTH CARE EDUCATION/TRAINING PROGRAM

## 2021-07-07 PROCEDURE — 97530 THERAPEUTIC ACTIVITIES: CPT

## 2021-07-07 RX ADMIN — DEXAMETHASONE SODIUM PHOSPHATE 3 MG: 4 INJECTION, SOLUTION INTRA-ARTICULAR; INTRALESIONAL; INTRAMUSCULAR; INTRAVENOUS; SOFT TISSUE at 05:59

## 2021-07-07 RX ADMIN — SERTRALINE 50 MG: 50 TABLET, FILM COATED ORAL at 21:43

## 2021-07-07 RX ADMIN — DOCUSATE SODIUM 100 MG: 100 CAPSULE, LIQUID FILLED ORAL at 09:21

## 2021-07-07 RX ADMIN — HYDROCODONE BITARTRATE AND ACETAMINOPHEN 1 TABLET: 5; 325 TABLET ORAL at 01:51

## 2021-07-07 RX ADMIN — DEXAMETHASONE SODIUM PHOSPHATE 3 MG: 4 INJECTION, SOLUTION INTRA-ARTICULAR; INTRALESIONAL; INTRAMUSCULAR; INTRAVENOUS; SOFT TISSUE at 14:20

## 2021-07-07 RX ADMIN — PANTOPRAZOLE SODIUM 40 MG: 40 TABLET, DELAYED RELEASE ORAL at 05:59

## 2021-07-07 RX ADMIN — SODIUM CHLORIDE, PRESERVATIVE FREE 10 ML: 5 INJECTION INTRAVENOUS at 21:43

## 2021-07-07 RX ADMIN — HYDRALAZINE HYDROCHLORIDE 25 MG: 25 TABLET, FILM COATED ORAL at 14:20

## 2021-07-07 RX ADMIN — SODIUM CHLORIDE, PRESERVATIVE FREE 10 ML: 5 INJECTION INTRAVENOUS at 09:22

## 2021-07-07 RX ADMIN — INSULIN LISPRO 4 UNITS: 100 INJECTION, SOLUTION INTRAVENOUS; SUBCUTANEOUS at 17:03

## 2021-07-07 RX ADMIN — DEXAMETHASONE SODIUM PHOSPHATE 3 MG: 4 INJECTION, SOLUTION INTRA-ARTICULAR; INTRALESIONAL; INTRAMUSCULAR; INTRAVENOUS; SOFT TISSUE at 22:55

## 2021-07-07 RX ADMIN — LEVETIRACETAM 500 MG: 100 INJECTION INTRAVENOUS at 21:43

## 2021-07-07 RX ADMIN — LEVETIRACETAM 500 MG: 100 INJECTION INTRAVENOUS at 09:21

## 2021-07-07 RX ADMIN — SERTRALINE 50 MG: 50 TABLET, FILM COATED ORAL at 09:22

## 2021-07-07 RX ADMIN — HYDRALAZINE HYDROCHLORIDE 25 MG: 25 TABLET, FILM COATED ORAL at 05:59

## 2021-07-07 RX ADMIN — LEVOTHYROXINE SODIUM 100 MCG: 0.1 TABLET ORAL at 05:59

## 2021-07-07 RX ADMIN — HYDRALAZINE HYDROCHLORIDE 25 MG: 25 TABLET, FILM COATED ORAL at 21:43

## 2021-07-07 RX ADMIN — ISOSORBIDE MONONITRATE 30 MG: 30 TABLET ORAL at 09:22

## 2021-07-07 RX ADMIN — ATORVASTATIN CALCIUM 10 MG: 10 TABLET, FILM COATED ORAL at 21:43

## 2021-07-07 RX ADMIN — DOCUSATE SODIUM 100 MG: 100 CAPSULE, LIQUID FILLED ORAL at 21:43

## 2021-07-07 RX ADMIN — POLYETHYLENE GLYCOL (3350) 17 G: 17 POWDER, FOR SOLUTION ORAL at 09:21

## 2021-07-07 ASSESSMENT — PAIN SCALES - GENERAL
PAINLEVEL_OUTOF10: 0
PAINLEVEL_OUTOF10: 10
PAINLEVEL_OUTOF10: 0
PAINLEVEL_OUTOF10: 0

## 2021-07-07 ASSESSMENT — PAIN DESCRIPTION - ONSET: ONSET: AWAKENED FROM SLEEP

## 2021-07-07 ASSESSMENT — PAIN DESCRIPTION - ORIENTATION: ORIENTATION: RIGHT

## 2021-07-07 ASSESSMENT — PAIN DESCRIPTION - PAIN TYPE: TYPE: ACUTE PAIN;SURGICAL PAIN

## 2021-07-07 ASSESSMENT — PAIN DESCRIPTION - DESCRIPTORS: DESCRIPTORS: PRESSURE

## 2021-07-07 ASSESSMENT — PAIN DESCRIPTION - PROGRESSION
CLINICAL_PROGRESSION: RAPIDLY WORSENING
CLINICAL_PROGRESSION: RESOLVED

## 2021-07-07 ASSESSMENT — PAIN - FUNCTIONAL ASSESSMENT: PAIN_FUNCTIONAL_ASSESSMENT: ACTIVITIES ARE NOT PREVENTED

## 2021-07-07 ASSESSMENT — PAIN DESCRIPTION - LOCATION: LOCATION: HEAD

## 2021-07-07 ASSESSMENT — PAIN DESCRIPTION - FREQUENCY: FREQUENCY: CONTINUOUS

## 2021-07-07 ASSESSMENT — PAIN DESCRIPTION - DIRECTION: RADIATING_TOWARDS: NO

## 2021-07-07 NOTE — PLAN OF CARE
Problem: Pain:  Goal: Pain level will decrease  Description: Pain level will decrease  Outcome: Ongoing  Note: Patient does not report pain this shift. Stated pain goal is no pain. Problem: Neurological  Goal: Maximum potential motor/sensory/cognitive function  Outcome: Ongoing  Note: Patient alert and oriented x4 but confused at times. Patient has chronic numbness and tingling in all 4 extremities. Working with therapy. Problem: Nutrition  Goal: Optimal nutrition therapy  7/6/2021 2236 by Abdias Ernst RN  Outcome: Ongoing  Note: Patient tolerating diet. 7/6/2021 1432 by Karlene Parks RD, LD  Outcome: Ongoing     Problem: Skin Integrity:  Goal: Will show no infection signs and symptoms  Description: Will show no infection signs and symptoms  Outcome: Ongoing  Goal: Absence of new skin breakdown  Description: Absence of new skin breakdown  Outcome: Ongoing  Note: Patient's skin remains intact this shift with no new signs of impaired skin integrity noted. Patient turned and repositioned every 2 hours. Special mattress in place to decrease pressure on high risk areas. Problem: Falls - Risk of:  Goal: Will remain free from falls  Description: Will remain free from falls  Outcome: Ongoing  Note: Patient remains free from falls this shift. Fall precautions in place with bed/chair exit alarmed. Fall sign posted and fall armband in place. Nonskid footwear used with transferring. Educated patient to use call light when in need of staff assistance with transferring, ambulating, and other activities of daily living. Patient appropriately uses call light this shift. Problem: Discharge Planning:  Goal: Discharged to appropriate level of care  Description: Discharged to appropriate level of care  Outcome: Ongoing  Note: Patient is from home with support. Possible inpatient rehab at discharge.       Problem: Metabolic:  Goal: Ability to maintain appropriate glucose levels will improve  Description: Ability to maintain appropriate glucose levels will improve  Outcome: Ongoing  Note: Patient's nightly blood sugar 143. Insulin on board per sliding scale. Care plan reviewed with patient. Patient verbalized understanding of the plan of care and contributed to goal setting.

## 2021-07-07 NOTE — PROGRESS NOTES
[facility]  Hematology/ Oncology Progress Note     Pt Name: Abby Taylor  MRN: [id number]  [id number]  YOB: 1933  Admit Date: 6/28/2021  9:03 AM  Date of evaluation: 7/7/2021  Primary Care Physician: Brie Kaplan MD   [id number]       SUBJECTIVE: Getting Physical therapy. OBJECTIVE    Physical  VITALS:  BP (!) 131/93   Pulse 73   Temp 97.9 °F (36.6 °C) (Oral)   Resp 16   Ht 5' (1.524 m)   Wt 134 lb (60.8 kg)   LMP  (LMP Unknown)   SpO2 97%   BMI 26.17 kg/m²   CONSTITUTIONAL:  awake  EYES:  ENT:  normocepalic, without obvious abnormality  NECK:  supple, symmetrical, trachea midline  HEMATOLOGIC/LYMPHATICS:  no cervical lymphadenopathy and no supraclavicular lymphadenopathy  LUNGS:  Clear to A and P.  CARDIOVASCULAR:  normal apical pulses  ABDOMEN:  Soft Non tender.     Data  Labs:  General Labs:    CBC:   Lab Results   Component Value Date    WBC 10.0 07/07/2021    RBC 3.72 07/07/2021    RBC 4.23 04/08/2019    HGB 11.2 07/07/2021    HCT 36.8 07/07/2021    MCV 98.9 07/07/2021    MCH 30.1 07/07/2021    MCHC 30.4 07/07/2021    RDW 17.0 04/08/2019     07/07/2021    MPV 12.1 07/07/2021     CBC with Differential:    Lab Results   Component Value Date    WBC 10.0 07/07/2021    RBC 3.72 07/07/2021    RBC 4.23 04/08/2019    HGB 11.2 07/07/2021    HCT 36.8 07/07/2021     07/07/2021    MCV 98.9 07/07/2021    MCH 30.1 07/07/2021    MCHC 30.4 07/07/2021    RDW 17.0 04/08/2019    NRBC 0 07/03/2021    SEGSPCT 90.9 07/03/2021    LYMPHOPCT 21.3 04/08/2019    MONOPCT 3.8 07/03/2021    MONOPCT 9.2 07/23/2018    EOSPCT 0.9 04/08/2019    BASOPCT 0.1 04/08/2019    MONOSABS 0.6 07/03/2021    LYMPHSABS 0.7 07/03/2021    EOSABS 0.0 07/03/2021    BASOSABS 0.0 07/03/2021     CMP:    Lab Results   Component Value Date     07/06/2021    K 4.0 07/06/2021    K 3.8 07/06/2021     07/06/2021    CO2 26 07/06/2021    BUN 20 07/06/2021    CREATININE 0.4 07/06/2021 AGRATIO 1.7 07/23/2018    LABGLOM >90 07/06/2021    GLUCOSE 90 07/06/2021    GLUCOSE 103 07/23/2018    PROT 5.8 07/06/2021    LABALBU 4.0 07/06/2021    CALCIUM 8.9 07/06/2021    BILITOT 0.5 07/06/2021    BILITOT NEGATIVE 11/13/2018    ALKPHOS 53 07/06/2021    AST 78 07/06/2021    ALT 81 07/06/2021     BMP:    Lab Results   Component Value Date     07/06/2021    K 4.0 07/06/2021    K 3.8 07/06/2021     07/06/2021    CO2 26 07/06/2021    BUN 20 07/06/2021    LABALBU 4.0 07/06/2021    CREATININE 0.4 07/06/2021    CALCIUM 8.9 07/06/2021    LABGLOM >90 07/06/2021    GLUCOSE 90 07/06/2021    GLUCOSE 103 07/23/2018     Current Medications  Current Facility-Administered Medications: Dexamethasone Sodium Phosphate injection 3 mg, 3 mg, Intravenous, Q8H  hydrALAZINE (APRESOLINE) tablet 25 mg, 25 mg, Oral, 3 times per day  fentaNYL (SUBLIMAZE) injection 25 mcg, 25 mcg, Intravenous, Q2H PRN  sodium chloride flush 0.9 % injection 5-40 mL, 5-40 mL, Intravenous, 2 times per day  sodium chloride flush 0.9 % injection 5-40 mL, 5-40 mL, Intravenous, PRN  0.9 % sodium chloride infusion, 25 mL, Intravenous, PRN  ondansetron (ZOFRAN-ODT) disintegrating tablet 4 mg, 4 mg, Oral, Q8H PRN **OR** ondansetron (ZOFRAN) injection 4 mg, 4 mg, Intravenous, Q6H PRN  HYDROcodone-acetaminophen (NORCO) 5-325 MG per tablet 1 tablet, 1 tablet, Oral, Q6H PRN  HYDROcodone-acetaminophen (NORCO) 5-325 MG per tablet 1 tablet, 1 tablet, Oral, Q6H PRN  hydrALAZINE (APRESOLINE) injection 10 mg, 10 mg, Intravenous, Q8H PRN  isosorbide mononitrate (IMDUR) extended release tablet 30 mg, 30 mg, Oral, Daily  levothyroxine (SYNTHROID) tablet 100 mcg, 100 mcg, Oral, Daily  pantoprazole (PROTONIX) tablet 40 mg, 40 mg, Oral, QAM AC  sertraline (ZOLOFT) tablet 50 mg, 50 mg, Oral, BID  atorvastatin (LIPITOR) tablet 10 mg, 10 mg, Oral, Nightly  ondansetron (ZOFRAN-ODT) disintegrating tablet 4 mg, 4 mg, Oral, Q8H PRN **OR** ondansetron (ZOFRAN) injection 4 mg, 4 mg, Intravenous, Q6H PRN  polyethylene glycol (GLYCOLAX) packet 17 g, 17 g, Oral, Daily  fleet rectal enema 1 enema, 1 enema, Rectal, Daily PRN  lidocaine 4 % external patch 2 patch, 2 patch, Topical, Daily  levETIRAcetam (KEPPRA) 500 mg in sodium chloride 0.9 % 100 mL IVPB, 500 mg, Intravenous, Q12H  insulin lispro (HUMALOG) injection vial 0-6 Units, 0-6 Units, Subcutaneous, TID WC  insulin lispro (HUMALOG) injection vial 0-3 Units, 0-3 Units, Subcutaneous, Nightly  glucose (GLUTOSE) 40 % oral gel 15 g, 15 g, Oral, PRN  dextrose 50 % IV solution, 12.5 g, Intravenous, PRN  glucagon (rDNA) injection 1 mg, 1 mg, Intramuscular, PRN  dextrose 5 % solution, 100 mL/hr, Intravenous, PRN  docusate sodium (COLACE) capsule 100 mg, 100 mg, Oral, BID    ASSESSMENT AND PLAN  Patient Active Problem List:     GERD (gastroesophageal reflux disease)     Carotid stenosis     Hypercholesteremia     Hypothyroidism due to acquired atrophy of thyroid     Steroid-induced hyperglycemia     Blind right eye     Current chronic use of systemic steroids     Personal history of cardiac dysrhythmia     Coronary artery disease involving native coronary artery of native heart without angina pectoris     Anxiety     H/O: CVA (cerebrovascular accident)     Lumbosacral spinal stenosis     Vitamin D deficiency     Hx of breast cancer with liver mets     Breast cancer metastasized to liver [facility])     Fall     Traumatic ecchymosis of forehead     Brain mass     Subarachnoid hemorrhage following injury, no loss of consciousness (HCC)     Traumatic ecchymosis of left lower leg     Traumatic ecchymosis of left forearm     Platelet inhibition due to Plavix     Fall at home, initial encounter     Brain edema ([address]     Intraparenchymal hemorrhage of brain ([address]     Closed head injury     Breast cancer metastasized to brain, right ([address]     Status post craniotomy    A 1 Brain metastasis S/P Craniotomy and resection of Single met.       2 Breast Cancer and Liver mets. Responded to treatment. Last chemo in 2019. 3 Elevated Liver enzymes. Plan[de-identified] CT chest, Abdomen and  Pelvis             ER, MD and HER 2 on tissue are pending.              INÉS Oneal,CWS  3:51 PM  7/7/2021

## 2021-07-07 NOTE — CONSULTS
[address] [place], [place] [id number]                                  CONSULTATION    PATIENT NAME: Erna Blake                      :        1933  MED REC NO:   [id number]                           ROOM:       0010  ACCOUNT NO:   [de-identified]                           ADMIT DATE: 2021  PROVIDER:     SARAH Tovar Rika:  2021    ONCOLOGIC CONSULTATION    REFERRING PROVIDER:  Guerda Moe MD    HISTORY OF PRESENT ILLNESS:  This is an 80-year-old female who had a  history of breast cancer which was metastatic at the time of diagnosis,  it was diagnosed in 2018. She had liver lesions and colonoscopy was  negative and she had a big breast mass. The liver biopsy on 05/10/2016  showed that she had liver metastasis with breast primary. She underwent  chemotherapy and Herceptin. She did very well and responded completely  and total disappearance of her liver metastasis and breast mass. Her  last chemotherapy was somewhere in 2019, two years ago. She has been  doing well since then. Recently, she had frequent falls and she has hit  her head and she was brought to the emergency room and was found to have  a large, 5-cm brain mass in the right occipitotemporal lobe junction and  Dr. Jeanie Mcdonald did craniotomy and resection of the mass on 2021 and the  final pathology showed that is a poorly differentiated carcinoma  consistent with breast carcinoma. ER, WA, and HER2 are not done. The  postsurgery MRI done on 2021 showed large cavity at the surgical  site. No other lesions were found. She is getting stronger. She is  awake, alert, oriented x3, and is not in any distress. Her legs are  somewhat weak. She is able to walk with Physical Therapy. I had done  recently a bone scan and ultrasound of the liver, both were negative for  any metastatic disease.     PAST MEDICAL HISTORY:  Significant for breast cancer; metastasis to the  liver, diagnosed in 05/2016; carotid artery stenosis. She had a  colostomy which was reversed later on. Hypercholesterolemia,  hypothyroidism, lumbago, lumbosacral spinal stenosis, multiple  drug-resistant organisms which were cleared, cardiac arrhythmia, sigmoid  diverticulosis, spondylolisthesis, subclavian artery stenosis on the  left side, temporal arteritis, vertebral artery occlusion, and vitamin D  deficiency. PAST SURGICAL HISTORY:  Significant for cardiac catheterization in 2007,  cholecystectomy in 2008, colonoscopy, colostomy, reversal of colostomy,  D and C of the uterus, endoscopy, right eye removal, hysterectomy,  hernia repair, subclavian pulmonary artery shunt, EGD. SOCIAL HISTORY:  She has no children, does not smoke, does not drink. FAMILY HISTORY:  Her sister also had breast cancer, brother had lung  cancer. Another brother has colon cancer and son has lung cancer. REVIEW OF SYSTEMS:  As in the present history. MEDICATIONS:  She is on Decadron 3 mg q.8, hydralazine, fentanyl  injection, Zofran, Norco, Synthroid, Imdur, Protonix, Zoloft, Lipitor,  Keppra, and insulin. ALLERGIES:  SHE IS ALLERGIC TO PENICILLIN WHICH GIVES RASH. SHE IS ALSO  ALLERGIC TO BACTRIM. PHYSICAL EXAMINATION:  GENERAL:  She is awake, alert, oriented x3 in no acute distress. VITAL SIGNS:  Afebrile. Blood pressure is 154/132, pulse is 74,  respirations 16. HEENT:  Throat is clear. NECK:  No nodes. LUNGS:  Clear to auscultation and percussion. HEART:  Regular rhythm. ABDOMEN:  Soft, nontender. No organomegaly. EXTREMITIES:  No edema. LABORATORY DATA:  White count is 10.2. Hemoglobin is 10.5. Platelets  are 286,917. Liver functions showed that ALT is elevated at 81, AST  elevated 78, total protein low at 5.5. Alkaline phosphatase is normal  at 53. Liver functions were normal on 07/01/2021.   Creatinine is  normal.    IMAGING DATA:  MRI of the brain as

## 2021-07-07 NOTE — PROGRESS NOTES
Plan of Care, Reviewed recommendations for follow-up, Education Related to Potential Risks and Complications Due to Impairment/Illness/Injury and Education Related to Prevention of Recurrence of Impairment/Illness/Injury  Evaluation of Education: Verbalizes understanding, Demonstrates with assistance and Needs further instruction    ASSESSMENT/PLAN:  Activity Tolerance:  Patient tolerance of treatment: good. Cooperative with ST and POC. Assessment/Plan: Patient progressing toward established goals. Continues to require skilled care of licensed speech pathologist to progress toward achievement of established goals and plan of care.      Plan for Next Session: Thought Organization, Attention         Isidoro Cockayne [facility]) [address]

## 2021-07-07 NOTE — CONSULTS
Herceptin. She did very well and responded completely. Last chemotherapy was somewhere in 07/2019, two years ago. She has been doing well since then. Recently, she had frequent falls and she has hit her head and she was brought to the emergency room and was found to have a large, 5-cm brain mass in the right occipitotemporal lobe junction. Dr. Jeremi Vargas performed a craniotomy and resection of the mass on 07/02/2021 and the final pathology showed that is a poorly differentiated carcinoma consistent with breast carcinoma. ER, SD, and HER2 are not done. The postsurgery MRI done on 07/04/2021 showed large cavity at the surgical site. No other lesions were found. 06/28/21 CT Chest    Impression   1. No acute traumatic intrathoracic findings. 06/28/21 CT Abd/pelv    Impression   Multiple chronic findings. No acute abdominal or pelvic abnormality. 07/01/21 MRI Brain w Cons    Impression    Redemonstration of a 5.5 cm irregularly enhancing intra-axial mass at the right temporal occipital junction as evidence for high-grade neoplasm with stable 1 cm leftward midline shift for surgical planning. 07/04/21 MRI Brain w wo Cons    Impression       1. The patient has undergone interval removal of the large mass in the right posterior temporal lobe. 2. There is a residual 3.9 x 2.1 x 1.7 cm cavity at the surgical site with increased T1 weighted signal intensity suggestive of hemorrhage. 3. There is moderate surrounding edema. 4. There is significantly less mass effect and midline deviation than on previous study. 5. There are probable ischemic changes in the white matter with no evidence of an acute infarct. 6. There are postoperative changes in the right orbit.    7. Otherwise negative MRI scan of the brain with and without intravenous contrast.            Breast cancer metastasized to brain, right ([address]   7/2/2021 Surgery    Craniotomy with resection of brain mass, performed by Dr. Brandyn Green ([facility])    FINAL DIAGNOSIS:   A-B. Right parietal lobe, masses 1 and 2, biopsies:    Metastatic carcinoma consistent with breast primary. See microscopic description. 2021 Initial Diagnosis    Breast cancer metastasized to brain, right [facility])      Chemotherapy    Systemic therapy administered under the care of Dr. Kareem Grove [facility])         Ms. Orestes Vaca is a 80 y.o. female with above mentioned oncologic history, being seen as an inpatient, for initial consultation regarding the potential role for radiation therapy. She was seen, lying in bed in no acute distress. She is doing well post operatively with no new significant neurological complaints. She mentions a brief headache yesterday but states that it has improved, denies current pain. She is otherwise well with no other general complaints at this time. REVIEW OF SYSTEMS: AS per HPI above. PHYSICAL EXAMINATION:   VITAL SIGNS: BP (!) 117/56   Pulse 66   Temp 97.7 °F (36.5 °C) (Oral)   Resp 16   Ht 5' (1.524 m)   Wt 134 lb (60.8 kg)   LMP  (LMP Unknown)   SpO2 95%   BMI 26.17 kg/m²     ECO - Symptomatic, <50% in bed during the day (Ambulatory and capable of all self care but unable to carry out any work activities. Up and about more than 50% of waking hours)    PAIN: 0/10    GENERAL: Pleasant well-developed adult. In no acute distress. Alert and oriented ×3  HEENT: Normocephalic, atraumatic. LUNGS: Unlabored  CARDIAC: Regular rate and rhythm  ABDOMEN: Soft, nontender, nondistended  NEUROLOGIC: No grossly apparent focal deficits, good strength and sensation throughout bilaterally.  Cranial nerves grossly intact    Past Medical History:   Diagnosis Date    Anxiety     Blind right eye     Breast cancer metastasized to liver ([facility] [address] 05/10/2016    Liver lesion noted     Carotid stenosis      Left ICA 25%    Colostomy in place [facility]) 2016    Degenerative arthritis of cervical spine     GERD (gastroesophageal reflux disease) 11/06    Hypercholesteremia     Hypoestrogenism     Hypothyroidism     Lumbago     Lumbosacral spinal stenosis 05/2019    MDRO (multiple drug resistant organisms) resistance 2008    pt stated cleared    Personal history of cardiac dysrhythmia     Sigmoid diverticulosis 8/04    Spondylolisthesis of lumbosacral region 8/04    Steroid-induced hyperglycemia     Subclavian artery stenosis (HCC)     left    Superior and Inferior Pubic ramus fracture, right, closed, initial encounter ([facility] 05/21/2019    SVT (supraventricular tachycardia) ([facility] 6/07    Temporal arteritis ([facility]     Vertebral artery occlusion     left    Vitamin D deficiency 06/2017        Past Surgical History:   Procedure Laterality Date    CARDIAC CATHETERIZATION  5/07    CATARACT REMOVAL  right 1/08; left 2/08    CHOLECYSTECTOMY  1/03    COLONOSCOPY  11/06    COLOSTOMY  09/04/2018    REVERSAL OF COLOSTOMY    CRANIOTOMY Right 7/2/2021    CRANIOTOMY FOR RESECTION/DEBULKING OF RIGHT SIDE INTRA BRAIN TUMOR performed by Teresa Shah MD at [address]      ENDOSCOPY, COLON, DIAGNOSTIC      EYE REMOVAL Right 03/2017    EYE SURGERY      EYE SURGERY Right 11/06/2017    Dr Lopez in [address]      partial    OTHER SURGICAL HISTORY  05/27/2016    robotic assisted laparoscopic anterior colon resection and end colostomy    NE OFFICE/OUTPT VISIT,PROCEDURE ONLY N/A 9/4/2018    COLOSTOMY REVERSAL AND PARASTOMAL HERNIA REPAIR performed by Nannette Calero MD at [address] PULMONARY [facility]UNT  2002    UPPER GASTROINTESTINAL ENDOSCOPY  11/06       Family History   Problem Relation Age of Onset    Cancer Sister 61        breast    Cancer Brother 79        lung    Cancer Brother 68        colon    Cancer Son 48        lung       Social History     Socioeconomic History    Marital status:       Spouse name: Not on file    Number of children: 0    Years of education: 10th grade     Highest education level: Not on file   Occupational History    Not on file   Tobacco Use    Smoking status: Former Smoker     Packs/day: 0.50     Types: Cigarettes    Smokeless tobacco: Never Used   Vaping Use    Vaping Use: Every day    Substances: Always   Substance and Sexual Activity    Alcohol use: No    Drug use: No    Sexual activity: Never   Other Topics Concern    Not on file   Social History Narrative    Not on file     Social Determinants of Health     Financial Resource Strain: Low Risk     Difficulty of Paying Living Expenses: Not hard at all   Food Insecurity: No Food Insecurity    Worried About [facility] in the Last Year: Never true    [facility] in the Last Year: Never true   Transportation Needs:     Lack of Transportation (Medical):  Lack of Transportation (Non-Medical):    Physical Activity:     Days of Exercise per Week:     Minutes of Exercise per Session:    Stress:     Feeling of Stress :    Social Connections:     Frequency of Communication with Friends and Family:     Frequency of Social Gatherings with Friends and Family:     Attends Buddhism Services:     Active Member of Clubs or Organizations:     Attends Club or Organization Meetings:     Marital Status:    Intimate Partner Violence:     Fear of Current or Ex-Partner:     Emotionally Abused:     Physically Abused:     Sexually Abused:         Allergies   Allergen Reactions    Bactrim [Sulfamethoxazole-Trimethoprim] Swelling     Of tongue    Demerol Rash     nausea    Pcn [Penicillins] Rash        Current Facility-Administered Medications   Medication Dose Route Frequency Provider Last Rate Last Admin    Dexamethasone Sodium Phosphate injection 3 mg  3 mg Intravenous Q8H Perla Sun MD   3 mg at 07/07/21 1420    hydrALAZINE (APRESOLINE) tablet 25 mg  25 mg Oral 3 times per day Perla Sun MD   25 mg at 07/07/21 1420    fentaNYL (SUBLIMAZE) injection 25 mcg  25 mcg Intravenous Q2H PRN Hazel Rolandojavid, APRN - CNP   25 mcg at 07/04/21 0602    sodium chloride flush 0.9 % injection 5-40 mL  5-40 mL Intravenous 2 times per day Esdras Taylor, PA-C   10 mL at 07/07/21 4040    sodium chloride flush 0.9 % injection 5-40 mL  5-40 mL Intravenous PRN Esdras Taylor, PA-C        0.9 % sodium chloride infusion  25 mL Intravenous PRN Esdras Taylor, PA-C        ondansetron (ZOFRAN-ODT) disintegrating tablet 4 mg  4 mg Oral Q8H PRN Esdras Claudia, PA-C        Or    ondansetron [facility]) injection 4 mg  4 mg Intravenous Q6H PRN Esdras Taylor, PA-C        HYDROcodone-acetaminophen (NORCO) 5-325 MG per tablet 1 tablet  1 tablet Oral Q6H PRN Esdras Quezadaal, PA-C   1 tablet at 07/04/21 2332    HYDROcodone-acetaminophen (NORCO) 5-325 MG per tablet 1 tablet  1 tablet Oral Q6H PRN Esdras Quezadaal, PA-C   1 tablet at 07/07/21 0151    hydrALAZINE (APRESOLINE) injection 10 mg  10 mg Intravenous Q8H PRN Esdras Taylor, PA-C   10 mg at 07/05/21 0657    isosorbide mononitrate (IMDUR) extended release tablet 30 mg  30 mg Oral Daily Joaquin Chand MD   30 mg at 07/07/21 7620    levothyroxine (SYNTHROID) tablet 100 mcg  100 mcg Oral Daily Esdras Taylor, PA-C   100 mcg at 07/07/21 0559    pantoprazole (PROTONIX) tablet 40 mg  40 mg Oral QAM AC Esdras Sabal, PA-C   40 mg at 07/07/21 0559    sertraline (ZOLOFT) tablet 50 mg  50 mg Oral BID Esdras Taylor, PA-C   50 mg at 07/07/21 3158    atorvastatin (LIPITOR) tablet 10 mg  10 mg Oral Nightly Esdras Sabal, PA-C   10 mg at 07/06/21 2001    ondansetron (ZOFRAN-ODT) disintegrating tablet 4 mg  4 mg Oral Q8H PRN Esdras Sabal, PA-C   4 mg at 07/02/21 7983    Or    ondansetron (ZOFRAN) injection 4 mg  4 mg Intravenous Q6H PRN Esdras Taylor PA-C   4 mg at 07/03/21 1028    polyethylene glycol (GLYCOLAX) packet 17 g  17 g Oral Daily Esdras Taylor PA-C   17 g at 07/07/21 Mona Balderas 96 rectal enema 1 enema  1 enema Rectal Daily PRN Sy Schneider PA-C        lidocaine 4 % external patch 2 patch  2 patch Topical Daily Sy Schneider PA-C   2 patch at 07/03/21 0848    levETIRAcetam (KEPPRA) 500 mg in sodium chloride 0.9 % 100 mL IVPB  500 mg Intravenous Q12H Sy Schneider PA-C   Stopped at 07/07/21 0936    insulin lispro (HUMALOG) injection vial 0-6 Units  0-6 Units Subcutaneous TID WC Sy Schneider PA-C   4 Units at 07/07/21 1703    insulin lispro (HUMALOG) injection vial 0-3 Units  0-3 Units Subcutaneous Nightly Sy Schneider PA-C   1 Units at 07/05/21 2034    glucose (GLUTOSE) 40 % oral gel 15 g  15 g Oral PRN Sy Schneider PA-C        dextrose 50 % IV solution  12.5 g Intravenous PRN Sy Schneider PA-C        glucagon (rDNA) injection 1 mg  1 mg Intramuscular PRN Sy Schneider PA-C        dextrose 5 % solution  100 mL/hr Intravenous PRN Sy Schneider PA-C        docusate sodium (COLACE) capsule 100 mg  100 mg Oral BID Sy Schneider PA-C   100 mg at 07/07/21 0445       Outpatient Medications Marked as Taking for the 6/28/21 encounter [facility] Encounter)   Medication Sig Dispense Refill    traZODone (DESYREL) 50 MG tablet take 1 tablet by mouth at bedtime if needed for sleep 30 tablet 0    simvastatin (ZOCOR) 20 MG tablet take 1 tablet by mouth every evening 90 tablet 1    loratadine (CLARITIN) 10 MG tablet take 1 tablet by mouth once daily 90 tablet 1    pantoprazole (PROTONIX) 40 MG tablet take 1 tablet by mouth once daily 90 tablet 1    sertraline (ZOLOFT) 50 MG tablet take 1 tablet by mouth twice a day 180 tablet 1    clopidogrel (PLAVIX) 75 MG tablet take 1 tablet by mouth once daily 90 tablet 1    isosorbide mononitrate (IMDUR) 30 MG extended release tablet take 1 tablet by mouth once daily 90 tablet 1    Calcium Carbonate-Vitamin D (OYSTER SHELL CALCIUM/D) 500-200 MG-UNIT TABS take 1 tablet by mouth twice a day 180 tablet 3    levothyroxine (SYNTHROID) 100 MCG tablet take 1 tablet by mouth once daily 90 tablet 1    RA ASPIRIN EC ADULT LOW ST 81 MG EC tablet take 1 tablet by mouth once daily      vitamin D (ERGOCALCIFEROL) 1.25 MG (49105 UT) CAPS capsule take 1 capsule by mouth every week      predniSONE (DELTASONE) 1 MG tablet Take 3 mg by mouth daily       Multiple Vitamins-Minerals (CENTRUM SILVER ADULT 50+) TABS Take 1 tablet by mouth daily 90 tablet 1       LABORATORY STUDIES:   Onc labs: No results found for: PSA, CEA, LDH, AFP    PATHOLOGY: As per HPI above. RADIOLOGIC STUDIES: As per HPI above. Electronically signed by Maribeth Truong MD on 7/7/21 at 7:11 PM EDT     ATTESTATION: 30 minutes were spent with the patient at today's visit reviewing pertinent information related to their oncologic diagnosis, including any recent labs, imaging, follow ups and plan of care going forward.     CC:Dr. Neda Chang ([facility]) Dr. Laquita Rosenbaum ([address])   ACC:[facility]

## 2021-07-07 NOTE — CARE COORDINATION
7/7/21, 4:14 PM EDT    DISCHARGE PLANNING EVALUATION  Deejay Lucero has been accepted to [facility] at discharge.

## 2021-07-07 NOTE — PROGRESS NOTES
SPoke with Dr. Fitz Bundy. Plan inpatient rehab once radiation oncology sees and makes a plan. Radiation will need to wait until after her inpatient rehab admission.

## 2021-07-07 NOTE — PROGRESS NOTES
[facility]  Occupational Therapy  Daily Note  Time:   Time In: 1005  Time Out: 1030  Timed Code Treatment Minutes: 25 Minutes  Minutes: 25          Date: 2021  Patient Name: Rupert Shore,   Gender: female      Room: [facility]10/010-A  MRN: [id number]  : 1933  (80 y.o.)  Referring Practitioner: PAMELA Roberts CNP  Diagnosis: Fall at Home  Additional Pertinent Hx: Rupert Shore is a 80 y.o. female who presents to the emergency department for evaluation of fall. Patient has had multiple falls over the past couple weeks, refused EMS transfer each time. Today patient had a fall, striking her right temple. Patient is on Plavix. 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 Only pain that she is complaining of is to her right temple. Per MRI: Heterogeneous 5.5 cm nodular peripherally enhancing cystic and solid mass centered at the right occipital/temporal lobe junction with prominent adjacent T2/FLAIR prolongation involving the corpus callosum, temporal, parietal and frontal lobes with prominent mass effect on the right lateral ventricle and 9 mm leftward midline shift. (High grade glioma, glioblastoma multiform). Pt is S/P R craniotomy on 21. Restrictions/Precautions:  Restrictions/Precautions: General Precautions, Fall Risk  Position Activity Restriction  Other position/activity restrictions: L neglect, R artificial eye     SUBJECTIVE: Pt. Nurse okayed OT treatment. Pt. Sitting up in chair upon entry Pt. Pleasant demo confusion    PAIN: No pain reported    Vitals: Vitals not assessed per clinical judgement, see nursing flowsheet    COGNITION: Decreased Recall, Decreased Insight, Impaired Memory, Decreased Problem Solving and Decreased Safety Awareness    ADL:   Toileting: Contact Guard Assistance, with set-up, with verbal cues  and with increased time for completion.      Toilet Transfer: Contact Guard Assistance, with set-up and with verbal cues . Cass Santoro BALANCE:  Standing Balance: Contact Guard Assistance, with cues for safety. BED MOBILITY:  Not Tested    TRANSFERS:  Sit to Stand:  [employer], with increased time for completion, cues for hand placement, with verbal cues. FUNCTIONAL MOBILITY:  Assistive Device: Rolling Walker  Assist Level:  Contact Guard Assistance, with set-up and with verbal cues . Distance: To and from bathroom and and in hallway full length x 1 no LOB although decreased safety due to L side neglect        ASSESSMENT:     Activity Tolerance:  Patient tolerance of  treatment: fair. Discharge Recommendations: Continue to assess pending progress, IP Rehab, Patient would benefit from continued therapy after discharge   Equipment Recommendations: Other: continue to assess needs  Plan: Times per week: 6x  Current Treatment Recommendations: Strengthening, Balance Training, Neuromuscular Re-education, Functional Mobility Training, Endurance Training, Home Management Training, Patient/Caregiver Education & Training, Self-Care / ADL, Safety Education & Training    Patient Education  Patient Education: ADL's, Importance of Increasing Activity and Assistive Device Safety    Goals  Short term goals  Time Frame for Short term goals: by discharge  Short term goal 1: Pt will complete visual scanning to L side with min cues during ADLs to decrease fall risk during ADLs. Short term goal 2: Pt will complete LB ADL with Morales and adaptive techs to increase indep with self care. Short term goal 3: Pt will complete dynamic standing task with B hand release and SBA x 4 min to increase balance required for ADL completion. Short term goal 4: Pt will complete functional mobility to/from BR with AD and SBA to increase indep with self care. Following session, patient left in safe position with all fall risk precautions in place.

## 2021-07-07 NOTE — PROGRESS NOTES
Family Medicine Progress Notes/Coverage    Today's Date: 7/7/21  4A-10/010-A  Medical Record # [id number]  CSN/Account # [de-identified]      Ms. Rod admitted on 6/28/2021        Subjective / Interval History :     known to me being her PCP  S/p Craniotomy right posterior craniotomy w/ resection of the right temporal lobe tumor  Reviewed notes, consults, laboratory and radiology results,    Objective:       Physical Exam:  Patient Vitals for the past 24 hrs:   BP Temp Temp src Pulse Resp SpO2 Weight   07/07/21 0916 (!) 148/65 97.8 °F (36.6 °C) Oral 71 16 96 %    07/07/21 0558 132/63         07/07/21 0340 132/64 97.7 °F (36.5 °C) Oral 59 16 95 % 134 lb (60.8 kg)   07/06/21 2256 (!) 152/55 99.1 °F (37.3 °C) Oral 63 14 94 %    07/06/21 1949 (!) 148/61 98.8 °F (37.1 °C) Oral 69 18 94 %    07/06/21 1835 123/72 98.2 °F (36.8 °C) Oral 71 20 97 %    07/06/21 1700 (!) 154/132   74 17     07/06/21 1600 (!) 153/82   73 17     07/06/21 1400 (!) 138/114   77 24     07/06/21 1300 (!) 156/64   83 16     07/06/21 1200    81 18         General Appearance:  Alert, cooperative, no distress, appears stated age   HEENT:  Neck: Right craniotomy scar healing well     Chest/Lungs:  Heart: CTA  RRR   Abdomen:  Back: Soft non tender   Extremities:  Neurological Exam: No edema  WNL except for blindness       Assessment:     Admitting Diagnosis:    Fall at home, initial encounter [W19. Melissa Peacock, N45.542]    Active Hospital Problems    Diagnosis Date Noted    Breast cancer metastasized to brain, right ([facility] [address] [C50.911, C79.31] 07/06/2021     Priority: High    Status post craniotomy [Z98.890] 07/02/2021     Priority: High    Fall [W19. XXXA] 06/28/2021     Priority: High    [facility] hemorrhage following [facility], [facility] [S06.6X0A] 06/28/2021     Priority: High    Traumatic ecchymosis of left lower leg [S80.12XA] 06/28/2021     Priority: Medium    Traumatic ecchymosis of left forearm [S50.12XA] 06/28/2021     Priority: Medium    Platelet inhibition due to Plavix [Z79.02] 06/28/2021     Priority: Medium    Fall at home, initial encounter [W19. Camelia Serrano, O40.108] 06/28/2021     Priority: Medium    Hx of breast cancer with liver mets [Z85.3] 08/13/2020     Priority: Medium    Current chronic use of systemic steroids [Z79.52] 09/04/2018     Priority: Medium    Coronary artery disease involving native coronary artery of native heart without angina pectoris [I25.10] 11/06/2017     Priority: Medium    Anxiety [F41.9] 06/29/2016     Priority: Medium    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]      Priority: Medium    Hypothyroidism due to acquired atrophy of thyroid [E03.4] 03/03/2016     Priority: Medium    GERD (gastroesophageal reflux disease) [K21.9]      Priority: Medium    Closed head injury [S09.90XA]     Intraparenchymal hemorrhage of brain ([address] [I61.9]     Brain edema ([address] [G93.6]     Traumatic ecchymosis of forehead [S00.83XA] 06/28/2021    Brain mass [G93.89] 06/28/2021       Plan:     Discussed plans with the nursing staff  Discussed with her niece re D/C plans, she live by herself but having fall, Patient been at [facility] in the past.. Rehab VS ECF    Medications, Laboratories and Imaging results:    Scheduled Meds:   dexamethasone  3 mg Intravenous Q8H    hydrALAZINE  25 mg Oral 3 times per day    sodium chloride flush  5-40 mL Intravenous 2 times per day    isosorbide mononitrate  30 mg Oral Daily    levothyroxine  100 mcg Oral Daily    pantoprazole  40 mg Oral QAM AC    sertraline  50 mg Oral BID    atorvastatin  10 mg Oral Nightly    polyethylene glycol  17 g Oral Daily    lidocaine  2 patch Topical Daily    levetiracetam  500 mg Intravenous Q12H    insulin lispro  0-6 Units Subcutaneous TID WC    insulin lispro  0-3 Units Subcutaneous Nightly    docusate sodium  100 mg Oral BID     Continuous Infusions:   sodium chloride      dextrose       PRN Meds:fentanNYL, sodium chloride flush, sodium chloride, ondansetron **OR** ondansetron, HYDROcodone 5 mg - acetaminophen, HYDROcodone 5 mg - acetaminophen, hydrALAZINE, ondansetron **OR** ondansetron, fleet, glucose, dextrose, glucagon (rDNA), dextrose    Imaging:    Lab Review :    Lab Results   Component Value Date    WBC 10.0 07/07/2021    HGB 11.2 (L) 07/07/2021    HCT 36.8 (L) 07/07/2021    MCV 98.9 07/07/2021     07/07/2021     Lab Results   Component Value Date    CREATININE 0.4 07/06/2021    BUN 20 07/06/2021     07/06/2021    K 4.0 07/06/2021     07/06/2021    CO2 26 07/06/2021     Lab Results   Component Value Date    CKTOTAL 70 06/28/2021     Lab Results   Component Value Date    ALT 81 (H) 07/06/2021    AST 78 (H) 07/06/2021    ALKPHOS 53 07/06/2021    BILITOT 0.5 07/06/2021     Lab Results   Component Value Date    LIPASE 12.6 08/12/2020         Electronically signed by Lyssa Nino MD on 7/7/21 at 11:01 AM AMELIA Fraire M.D.

## 2021-07-07 NOTE — CARE COORDINATION
7/7/21, 12:42 PM EDT    DISCHARGE ON GOING EVALUATION    [facility] day: 9  Location: [facility]10/010-A Reason for admit: Fall at home, initial encounter [W19. Tami Gottron, Y86.801]   Procedure:   6/28 CT Head/Facial bones/Chest/Abd/Pelvis: See injuries  6/28 CT Cervical/Thoracic/Lumbar spine: See injuries  6/28 MRI Brain:  Heterogeneous 5.5 cm nodular peripherally enhancing cystic and solid mass centered at the right occipital/temporal lobe junction with prominent adjacent T2/FLAIR prolongation involving the corpus callosum, temporal, parietal and frontal lobes with prominent mass effect on the right lateral ventricle and 9 mm leftward midline shift. 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  No significant change since yesterday.  Less likely is recent infarct. 7/2 CRANIOTOMY FOR RESECTION/DEBULKING OF RIGHT SIDE INTRA BRAIN TUMOR  7/3 CT Head:  1.  Mild interval decrease in the previously noted pneumocephalus compared    to the prior study. 2.  Postoperative changes are again noted.  Small amount of subarachnoid    blood in association with the right parietal lobe is stable. 3.  Stable white matter edema involving portions of the right cerebral    hemisphere.  There is stable approximately 5-6 mm of right-to-left midline    shift.      7/4 MRI Brain:   1. The patient has undergone interval removal of the large mass in the right posterior temporal lobe. 2. There is a residual 3.9 x 2.1 x 1.7 cm cavity at the surgical site with increased T1 weighted signal intensity suggestive of hemorrhage. 3. There is moderate surrounding edema. 4. There is significantly less mass effect and midline deviation than on previous study. 5. There are probable ischemic changes in the white matter with no evidence of an acute infarct.    6. There are postoperative changes in the right orbit. 7. Otherwise negative MRI scan of the brain with and without intravenous contrast        Barriers to Discharge: PT/OT, Neurosurgery following, Oncology, Radiation Oncology consult, Physiatry following, IV Decadron, pain control, diabetes management, IV Keppra. PCP: Chandni Arnold MD  Readmission Risk Score: 23%  Patient Goals/Plan/Treatment Preferences: from home alone. Plan IP Rehab. Will follow.

## 2021-07-07 NOTE — PROGRESS NOTES
[address] [address]  INPATIENT PHYSICAL THERAPY  DAILY NOTE  [facility] NEUROSCIENCES 4A - 4A-10/010-A    Time In: 6126  Time Out: 8665  Timed Code Treatment Minutes: 23 Minutes  Minutes: 23          Date: 2021  Patient Name: Olena Mariano,  Gender:  female        MRN: [id number]  : 1933  (80 y.o.)     Referring Practitioner: Rosana Lesches, APRN - CNP  Diagnosis: Fall at home  Additional Pertinent Hx: Wade Zhu is an 80year old female presenting to the Emergency Department via EMS for evaluation of potential injuries sustained when she fell in her home this morning around 5 am.  She reports that she was going back to her bed from her bathroom when her legs became weak and she fell. She is not sure if she hit her head on the nightstand but she states that the lamp that was on the nightstand fell on top of her, hitting the right side of her forehead/eye. She denies loss of consciousness. She was found by her home health aide around 0800 as she was not able to get herself up after falling. EMS reports that they have been to [facility] home multiple times over the last few weeks for lift assist but she refuses to go to the hospital for evaluation. Wade Zhu reports that her legs have been progressively becoming more weak. She denies headache, neck pain, chest pain, abdominal pain. She does admit to lower back pain and lower extremity weakness. No visual changes, lightheadedness or dizziness, nausea or vomiting. She denies any feelings of syncope prior to falling. 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      Prior Level of Function:  Lives With: Alone  Type of Home: House  Home Layout: One level  Home Access: Level entry  Home Equipment: [employer], 4 wheeled walker, Cane, Lift chair, Alert Button   Bathroom Shower/Tub: Tub/Shower unit, Shower chair with back  [employer]: Standard (with BSC over toilet)  Bathroom Equipment: Shower chair, 3-in-1 commode    Receives Help From: Personal care attendant  ADL Assistance: Needs assistance  Homemaking Assistance: Needs assistance  Homemaking Responsibilities: Yes  Ambulation Assistance: Independent  Transfer Assistance: Independent  Active : No  Additional Comments: 2 hours/day DIV[facility] has an aide that assists with ADLs/IADLs. nieces live nearby and able to help at times. Restrictions/Precautions:  Restrictions/Precautions: General Precautions, Fall Risk  Position Activity Restriction  Other position/activity restrictions: L neglect, R artificial eye     SUBJECTIVE: RN approved session. RN in room updating pt on brain mass results with pt receiving bad news becoming very emotional. Much extra time spent for emotional support. Pt declined exercise after returning to bed to rest and speak with family    PAIN: none indicated    Vitals: Vitals not assessed per clinical judgement, see nursing flowsheet    OBJECTIVE:  Bed Mobility:  Sit to Supine: Contact Guard Assistance     Transfers:  Sit to Stand: Stand By Assistance  Stand to Sit:Stand By Assistance. Cues for alignment and bring walker all the way back with them  Pt slightly impulsive to stand     Ambulation:  Contact Guard Assistance, with verbal cues , with increased time for completion  Distance: 15'x1  Surface: Level Tile  Device:Rolling Walker  Gait Deviations: Forward Flexed Posture, Slow Gillian, Decreased Step Length Bilaterally, Decreased Gait Speed and Decreased Heel Strike Bilaterally  L sided neglect with pt bumping into objects on left side. Cues for posture    Balance:  Static Sitting Balance:  Stand By Assistance  Pt sat EOB with no UE support and cues for posture    Exercise:  Patient declined due to bad news    Functional Outcome Measures: Completed  AM-PAC Inpatient Mobility Raw Score : 17  AM-PAC Inpatient T-Scale Score : 42.13    ASSESSMENT:  Assessment: Patient progressing toward established goals.   Activity Tolerance:  Patient tolerance of  treatment: good. Pt tolerated session well. Limited by just receiving bad news. Decreased balance, left sided awareness, strength, and endurance. Pt would benefit from continued PT for improved functional mobility and increased independence    Equipment Recommendations:Equipment Needed: No  Discharge Recommendations:  IP Rehab, Continue to assess pending progress    Plan: Times per week: 6x T  Current Treatment Recommendations: Strengthening, Transfer Training, ROM, Balance Training, Gait Training, Functional Mobility Training, Home Exercise Program    Patient Education  Patient Education: Plan of Care, Bed Mobility, Transfers, Gait    Goals:  Patient goals : go home  Short term goals  Short term goal 1: Pt will supine<>sit with supervision in order to get out of bed  Short term goal 2: Pt will sit<>stand with supervision   and RW inorder to transfer surfaces  Short term goal 3: Pt will perform stand/pivot transfer with use of RW, CGA, aligning up to next seated surface with min verbal cues. Met/discontinued  Short term goal 4: Pt to walk with  ft, SBA to progress to home mobility  Long term goals  Time Frame for Long term goals : no goals set due to short ELOS    Following session, patient left in safe position with all fall risk precautions in place.

## 2021-07-07 NOTE — PROGRESS NOTES
Addendum by Dr. Jim Amin MD:  I have seen and examined the patient independently. Face to face evaluation and examination was performed. The below evaluation and note have been reviewed. Labs and radiographs were reviewed. I Have discussed with Neurosurgery PA about this patient in detail. The below assessment and plan have been reviewed. Please see my modifications mentioned below. My additional comments and modifications:    · Post Op 5 (S/P right-sided craniectomy and surgical resection of right-sided intraparenchymal tumor). · Patient is doing well. · No new neurological deficit. · The final result of the IVC is consistent with metastatic breast cancer. · Post op brain MRI consistent with gross total resection of the tumor. · Radiation and medical oncology consultations. · PT and OT. · Patient rehab consultation for discharge plan. · Patient can be discharged from neurosurgical perspective. · Follow-up with neurosurgery outpatient clinic for sutures removal after 12 to 15 days from surgery. · Steroids tapering over the course of 1 week. · New brain MRI with and without contrast after 1 month followed with a new visit with neurosurgery outpatient clinic. · From this time on, neurosurgery will see this patient only as needed as long as she is in the hospital.  Please call me if you have any further questions or concerns regarding this patient. · This case and the treatment plan were discussed with patient and her family, as well as patient nurse. · All questions and concerns were addressed and answered. Jim Amin MD        Neurosurgery Progress Note    Patient:  Krishna Baker      Unit/Bed:4A-10/010-A    YOB: 1933    MRN: [id number]     Acct: [de-identified]     Admit date: 6/28/2021    Chief Complaint   Patient presents with    Trauma    Fall       Patient Seen, Chart, Physician notes, Labs, Radiology studies reviewed.     Subjective: Patient is seen and evaluated on the floor having transition from the ICU setting without incident. She is sitting up in a chair resting comfortably with pain well-controlled postoperative day #5 from craniotomy and resection of intercranial lesion performed by Dr. Dimas Quesada, without complication. Past, Family, Social History unchanged from admission. Diet:  ADULT DIET; Regular  Adult Oral Nutrition Supplement; Standard High Calorie/High Protein Oral Supplement    Medications:  Scheduled Meds:   dexamethasone  3 mg Intravenous Q8H    hydrALAZINE  25 mg Oral 3 times per day    sodium chloride flush  5-40 mL Intravenous 2 times per day    isosorbide mononitrate  30 mg Oral Daily    levothyroxine  100 mcg Oral Daily    pantoprazole  40 mg Oral QAM AC    sertraline  50 mg Oral BID    atorvastatin  10 mg Oral Nightly    polyethylene glycol  17 g Oral Daily    lidocaine  2 patch Topical Daily    levetiracetam  500 mg Intravenous Q12H    insulin lispro  0-6 Units Subcutaneous TID WC    insulin lispro  0-3 Units Subcutaneous Nightly    docusate sodium  100 mg Oral BID     Continuous Infusions:   sodium chloride      dextrose       PRN Meds:fentanNYL, sodium chloride flush, sodium chloride, ondansetron **OR** ondansetron, HYDROcodone 5 mg - acetaminophen, HYDROcodone 5 mg - acetaminophen, hydrALAZINE, ondansetron **OR** ondansetron, fleet, glucose, dextrose, glucagon (rDNA), dextrose    Objective: She is observed sitting up in a chair, comfortably with pain well-controlled. Dressings are intact over flat dry incisions and she demonstrates clear appropriate speech and purposeful movement for all extremity groups. She remains alert and oriented and without significant complaints as she is stable and intact to her baseline on exam today with no additional changes noted to the patient chart overnight.     Vitals: /63   Pulse 59   Temp 97.7 °F (36.5 °C) (Oral)   Resp 16   Ht 5' (1.524 m)   Wt 134 lb (60.8 kg)   LMP (LMP Unknown)   SpO2 95%   BMI 26.17 kg/m²   Physical Exam:  Alert and attentive. Language appropriate, with no aphasia. Pupils equal.  Facial strength symmetric. Physical Exam  Patient is stable and grossly intact her baseline on exam with no additional changes noted the patient chart overnight. ROS:  Review of Systems  Patient denies headache, chest pain, shortness of breath, nausea or vomiting. Some incisional site discomfort is noted but improving and considered mild. 24 hour intake/output:    Intake/Output Summary (Last 24 hours) at 7/7/2021 0857  Last data filed at 7/7/2021 0340  Gross per 24 hour   Intake 1502 ml   Output 450 ml   Net 1052 ml     Last 3 weights: Wt Readings from Last 3 Encounters:   07/07/21 134 lb (60.8 kg)   06/15/21 137 lb 4 oz (62.3 kg)   05/25/21 136 lb (61.7 kg)         CBC:   Recent Labs     07/05/21  0350 07/06/21  0452 07/07/21  0525   WBC 10.7 10.2 10.0   HGB 10.9* 10.5* 11.2*   * 118* 136     BMP:    Recent Labs     07/06/21  0451 07/06/21  0715    141   K 3.8 4.0    107   CO2 24 26   BUN 21 20   CREATININE 0.4 0.4   GLUCOSE 112* 90     Calcium:  Recent Labs     07/06/21  0715   CALCIUM 8.9     Magnesium:No results for input(s): MG in the last 72 hours. Glucose:  Recent Labs     07/06/21  1708 07/06/21 2005 07/07/21  0627   POCGLU 144* 143* 91     HgbA1C: No results for input(s): LABA1C in the last 72 hours. Lipids: No results for input(s): CHOL, TRIG, HDL, LDLCALC in the last 72 hours. Invalid input(s): LDL    Radiology reports as per the Radiologist  Radiology: CT HEAD WO CONTRAST    Result Date: 7/3/2021  CT SCAN OF THE BRAIN WITHOUT CONTRAST COMPARISON: 7/2/2021. TECHNIQUE: A noncontrast CT scan of the brain was performed. A dose reduction technique was utilized. FINDINGS: Right sided craniotomy changes are noted, with underlying postsurgical changes involving the right parietal lobe.   Previously noted pneumocephalus is mildly decreased compared to the prior study. Previously noted small amount of subarachnoid blood in association with the right parietal lobe is stable. Stable white matter edema is noted involving the right parietal lobe, and dorsal aspect of the right frontal lobe, the right temporal lobe, the posterior limb of the right internal capsule, and a portion of the right external capsule. At the level of the septum pellucidum, there is estimated to be approximately 5-6 mm of right-to-left midline shift. This is stable. There is no hydrocephalus or transtentorial herniation. 1.  Mild interval decrease in the previously noted pneumocephalus compared to the prior study. 2.  Postoperative changes are again noted. Small amount of subarachnoid blood in association with the right parietal lobe is stable. 3.  Stable white matter edema involving portions of the right cerebral hemisphere. There is stable approximately 5-6 mm of right-to-left midline shift. This document has been electronically signed by: Shelby Clark M.D. on 07/03/2021 05:52 AM All CTs at this facility use dose modulation techniques and iterative reconstructions, and/or weight-based dosing when appropriate to reduce radiation to a low as reasonably achievable. CT HEAD WO CONTRAST    Result Date: 7/2/2021  CT head without contrast Comparison:  CT,SR  - CT HEAD WO CONTRAST  - 06/29/2021 05:27 AM EDT Findings: Recent surgical changes of partial right parietal resection with overlying craniotomy and scalp staples. Mild postsurgical subarachnoid hemorrhage in the right parietal sulci and moderate right greater than left postsurgical pneumocephalus. Stable white matter vasogenic edema in the right cerebral hemisphere with sparing in the right frontal region. There is similar 5.4 mm leftward midline shift. The visualized paranasal sinuses and mastoid air cells are normal. The orbits are within normal limits. No skull fracture.      1.  Recent partial right parietal resection with small amount of postsurgical subarachnoid hemorrhage, and right greater than left postsurgical pneumocephalus. This may be followed up for resolution. 2.  Stable white matter vasogenic edema in the right cerebral hemisphere with sparing of the right frontal region. Stable 5.4 mm leftward midline shift. This document has been electronically signed by: Evi Mo MD on 07/02/2021 07:36 PM All CTs at this facility use dose modulation techniques and iterative reconstructions, and/or weight-based dosing when appropriate to reduce radiation to a low as reasonably achievable. CT HEAD WO CONTRAST    Result Date: 6/29/2021  EXAM:  CT HEAD WITHOUT CONTRAST: COMPARISON:  CT,KOSR  - CT HEAD WO CONTRAST  - 06/28/2021 09:18 AM EDT  CT,SR  - CT HEAD WO CONTRAST  - 09/08/2019 04:22 PM EDT TECHNIQUE:  Helical noncontrast axial images from base of skull to vertex, with coronal and sagittal reformats. FINDINGS: No significant change since yesterday. There is effacement of sulci throughout the right temporal and parietal lobes, occipital lobe and posterior right frontal lobe, with extensive low-attenuation prominence of white matter, extending into subcortical regions, and discontinuous calcification in prior to occipital cortex. Low-attenuation extends into the right external and internal capsules. The right lateral ventricle is partially effaced. There is approximately 8 mm of midline shift, not significantly changed. No transtentorial herniation. Possibility of a small amount of cortical hemorrhage, in addition to the apparent calcifications, unchanged. No new hemorrhage. The left hemisphere is grossly unremarkable. No brainstem or cerebellar abnormality is appreciated. Sinuses and mastoid air cells are clear. No calvarial or skull base lesion. Extensive right hemispheric abnormality is concerning for tumor, with mass effect. No significant change since yesterday. Less likely is recent infarct.  This document has been electronically signed by: Bryan Diaz MD on 06/29/2021 08:15 AM All CTs at this facility use dose modulation techniques and iterative reconstructions, and/or weight-based dosing when appropriate to reduce radiation to a low as reasonably achievable. CT HEAD WO CONTRAST    Result Date: 6/28/2021  PROCEDURE: CT HEAD WO CONTRAST CLINICAL INFORMATION: fal . COMPARISON: 9/8/2019 TECHNIQUE: 2-D multiplanar noncontrast images of the brain All CT scans at this facility use dose modulation, iterative reconstruction, and/or weight-based dosing when appropriate to reduce radiation dose to as low as reasonably achievable. FINDINGS: There is heterogeneous low-density mass in the right parieto-occipital lobe image 21 with a small amount of parenchymal hemorrhage. There is small amount of subarachnoid hemorrhage associated with this. There is extensive white matter edema involving most of the right hemisphere extending into the temporal lobe. This results in compression of the right lateral ventricle and midline shift of 8 mm. There is slight mass effect on the right very subtle mass effect on the right basal cistern. Quadrigeminal cistern is intact. Calvarium is intact. Right globe prosthesis is present. Visualized paranasal sinuses and mastoid air cells are clear     Mixed density mass in the right parietal occipital lobe with extensive surrounding white matter edema and minimal parenchymal and subarachnoid hemorrhage. There is midline shift 8 mm left to right. Compression of the right lateral ventricle. Minimal mass effect on the basilar cistern. This was called to Dr. Salvador Patel at time of exam. **This report has been created using voice recognition software. It may contain minor errors which are inherent in voice recognition technology. ** Final report electronically signed by Dr. Anish Cabrera on 6/28/2021 9:56 AM    CT FACIAL BONES WO CONTRAST    Result Date: 6/28/2021  PROCEDURE: CT FACIAL BONES WO CONTRAST CLINICAL INFORMATION: tr. Fall with right-sided pain and bruising. COMPARISON: CT head dated 9/8/2019 and CT facial bones dated 8/20/2017. TECHNIQUE: Helical CT of the face with sagittal and coronal reconstructions. All CT scans at this facility use dose modulation, iterative reconstruction, and/or weight-based dosing when appropriate to reduce radiation dose to as low as reasonably achievable. FINDINGS:  Maxillofacial bones are normally aligned without visualized fracture. The mandible appears intact. Patient is edentulous. There is rightward deviation of the nasal septum, stable compared to prior exams. There is a right-sided globe prosthesis, stable compared to prior exam. Paranasal sinuses are clear. Mastoid air cells and middle ears are clear. No evidence of acute osseous injury of the face. **This report has been created using voice recognition software. It may contain minor errors which are inherent in voice recognition technology. ** Final report electronically signed by Dr. Sabrina Mina MD on 6/28/2021 10:04 AM    CT CHEST W CONTRAST    Result Date: 6/28/2021  PROCEDURE: CT CHEST W CONTRAST CLINICAL INFORMATION: tr COMPARISON: 8/26/2016 TECHNIQUE:  Axial CT images were obtained through the chest following the intravenous and demonstration of 80 mL Isovue 370 contrast. Coronal and sagittal reformatted images were rendered. All CT scans at this facility use dose modulation, iterative reconstruction, and/or weight-based dosing when appropriate to reduce radiation dose to as low as reasonably achievable. FINDINGS: The central airways appear patent. There is a 3 mm pulmonary nodule within the left lower lobe demonstrated on axial image 58. This appears unchanged from 8/26/2016 and likely represents a poorly calcified granuloma given its stability. No pneumothorax or pleural effusion is seen. No mediastinal hematoma is demonstrated. CT of the abdomen and pelvis are reported separately.  No acute osseous findings are demonstrated. 1. No acute traumatic intrathoracic findings. **This report has been created using voice recognition software. It may contain minor errors which are inherent in voice recognition technology. ** Final report electronically signed by Dr. Boris Cintron on 6/28/2021 10:00 AM    CT CERVICAL SPINE WO CONTRAST    Result Date: 6/28/2021  PROCEDURE: CT CERVICAL SPINE WO CONTRAST CLINICAL INFORMATION: tr. Right-sided neck pain and bruising after fall. COMPARISON: CT cervical spine dated 9/8/2019. TECHNIQUE: 3 mm noncontrast axial images were obtained through the cervical spine with sagittal and coronal reconstructions. All CT scans at this facility use dose modulation, iterative reconstruction, and/or weight-based dosing when appropriate to reduce radiation dose to as low as reasonably achievable. FINDINGS: There is a dextrocurvature of the cervical spine which is likely positional.  There is grade 1 anterolisthesis of C7 relative to T1 on the basis of degenerative change. There is stable mild irregularity of the superior endplate of C7 likely associated with Schmorl's node. There is otherwise anatomic vertebral body height and alignment. No fracture of the cervical vertebral column is identified. The craniocervical junction appears intact. No paraspinal or epidural fluid collection is identified. Paraspinal soft tissues are grossly unremarkable. There are stable mild degenerative changes of the cervical spine. No evidence of acute osseous injury of the cervical spine. **This report has been created using voice recognition software. It may contain minor errors which are inherent in voice recognition technology. ** Final report electronically signed by Dr. Dirk Holland MD on 6/28/2021 9:54 AM    MRI BRAIN W CONTRAST    Result Date: 7/1/2021  PROCEDURE: MRI BRAIN W CONTRAST CLINICAL INFORMATION: [facility] protocol . COMPARISON: MR brain dated 6/28/2021.  TECHNIQUE: Axial 3-D T1 weighted [place] protocol images were obtained through the brain before and after the administration of 10 mL ProHance injected in the right forearm. Fiducial markers are in place. Sagittal and coronal reconstructions were created. FINDINGS: Redemonstration of a multilobulated T1 hypointense mass centered at the temporal occipital junction on the right with prominent nodular and irregular curvilinear peripheral enhancement along with thickened enhancing septations measuring 5.5 x 3.8 cm in greatest axial dimensions, not significantly changed compared to prior MRI. There is prominent adjacent hypointense T1 signal. Redemonstration of prominent mass effect on the atrium and occipital horn of the right lateral ventricle with approximately 1 cm leftward midline shift, stable compared to prior exam.      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 **This report has been created using voice recognition software. It may contain minor errors which are inherent in voice recognition technology. ** Final report electronically signed by Dr. Austin Ruiz MD on 7/1/2021 10:12 AM    CT ABDOMEN PELVIS W IV CONTRAST Additional Contrast? None    Result Date: 6/28/2021  PROCEDURE: CT ABDOMEN PELVIS W IV CONTRAST CLINICAL INFORMATION: tr . COMPARISON: 8/12/2020 TECHNIQUE: 2-D multiplanar postcontrast images of the abdomen and pelvis Isovue-370 IV contrast. All CT scans at this facility use dose modulation, iterative reconstruction, and/or weight-based dosing when appropriate to reduce radiation dose to as low as reasonably achievable. FINDINGS: No solid organ injury. Liver and spleen are within normal limits. There may be early arterial enhancement causing heterogeneous enhancement of the spleen. Appearance of the liver also suggests early arterial enhancement. Pancreas is unremarkable. Adrenals are normal. Stable cyst lower pole left kidney. Kidneys enhance symmetrically and appear otherwise normal. Heavy atherosclerosis of the aorta. Right common iliac artery stent. No adenopathy. Left anterior abdominal hernia containing colon and small bowel. No obstruction at this time. Postsurgical changes right colon near the cecum. Urinary bladder is distended. Uterus is present. Postsurgical changes rectosigmoid colon. Bones 9 mm anterolisthesis L5 on S1. Degenerative disc disease. No acute or suspicious bone lesions identified. Remote healed fracture of the right superior and inferior pubic rami. Multiple chronic findings. No acute abdominal or pelvic abnormality. **This report has been created using voice recognition software. It may contain minor errors which are inherent in voice recognition technology. ** Final report electronically signed by Dr. Rick Alvarado on 6/28/2021 10:03 AM    US LIVER    Result Date: 6/29/2021  Ultrasound of the right upper quadrant Comparison:  US,SR  - US LIVER  - 05/26/2020 10:50 AM EDT Findings: Normal liver. No evidence for mass. Liver measures 14.4 cm. Status post cholecystectomy. Normal common bile duct at 5 mm. Normal visualized portion of the pancreas. Mild right hydronephrosis. No renal mass. Impression: Mild right hydronephrosis. This document has been electronically signed by: Ciera Mckinney MD on 06/29/2021 01:28 AM    XR CHEST 1 VIEW    Result Date: 7/1/2021  PROCEDURE: XR CHEST 1 VIEW CLINICAL INFORMATION: preop. COMPARISON: Chest x-ray dated 4 May 2021. TECHNIQUE: AP upright view of the chest. FINDINGS: There is borderline cardiomegaly. There is atherosclerotic calcification in the aortic arch. There is a stent at the origin of the left subclavian artery. There is slightly increased density in the right paratracheal space. There are no pulmonary infiltrates or effusions.  The pulmonary vascularity is normal. There are degenerative changes involving the acromioclavicular joints bilaterally and glenohumeral joints. .     1. No interval change since previous study dated for May 2021, no acute cardiopulmonary disease. **This report has been created using voice recognition software. It may contain minor errors which are inherent in voice recognition technology. ** Final report electronically signed by DR Elizabeth León on 7/1/2021 10:24 AM    CT LUMBAR RECONSTRUCTION WO POST PROCESS    Result Date: 6/28/2021  PROCEDURE: CT LUMBAR RECONSTRUCTION WO POST PROCESS CLINICAL INFORMATION: fall. Right-sided pain and bruising. COMPARISON: Cervical spine radiographs dated 3/16/2021. TECHNIQUE: 3 mm axial, sagittal and coronal CT images were reconstructed through the lumbar spine. These are reformatted from the patient's abdomen and pelvis CT. IV contrast is present. All CT scans at this facility use dose modulation, iterative reconstruction, and/or weight-based dosing when appropriate to reduce radiation dose to as low as reasonably achievable. FINDINGS: There are chronic bilateral pars defects at L5 with grade 2 anterolisthesis of L5 relative to S1, stable compared to prior radiographs. There is grade 1 retrolisthesis of L1 relative to L2 on the basis of degenerative change, also stable compared to prior radiographs. There is a minimal levocurvature of the lumbar spine, unchanged compared to prior contrast. There is otherwise anatomic vertebral body height and alignment. No acute fracture of the lumbar vertebral column is identified. At T12-L1 there is a minimal disc bulge without significant spinal canal or neuroforaminal stenosis. At L1-2 there is partial uncovering the disc without significant spinal canal stenosis and mild left and mild-to-moderate right neural foraminal stenosis in association with facet hypertrophy.  At L2-3 there is a shallow disc bulge which contributes to mild spinal canal stenosis and mild left and moderate right neuroforaminal stenosis in association with facet hypertrophy. At L3-4 there is a shallow disc bulge without significant spinal canal stenosis. There is mild bilateral foraminal stenosis in association with facet hypertrophy. At L4-5 there is no significant spinal canal stenosis. There is mild to moderate bilateral foraminal stenosis in association with shallow disc bulge and facet hypertrophy. At L5-S1 there is partial uncovering of the disc. There is moderate to severe bilateral neural foraminal stenosis from flattening of the neural foramina and uncovering of the disc associated with the anterolisthesis. 1. No evidence of acute osseous injury of the lumbar spine. 2. 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 3. Multilevel degenerative changes elsewhere. Please refer to the body of the report for segmental analysis. **This report has been created using voice recognition software. It may contain minor errors which are inherent in voice recognition technology. ** Final report electronically signed by Dr. Xiang Miller MD on 6/28/2021 10:00 AM    CT THORACIC RECONSTRUCTION WO POST PROCESS    Result Date: 6/28/2021  PROCEDURE: CT THORACIC RECONSTRUCTION WO POST PROCESS CLINICAL INFORMATION: Trauma . COMPARISON: No prior exam TECHNIQUE: 2-D multiplanar reconstructed images of the thoracic spine. All CT scans at this facility use dose modulation, iterative reconstruction, and/or weight-based dosing when appropriate to reduce radiation dose to as low as reasonably achievable. FINDINGS: Increased thoracic kyphosis. No acute fracture or acute bony malalignment. Diffuse osteopenia. Diffuse degenerative disc disease. No central canal encroachment. Dextroscoliosis. No foraminal encroachment or paraspinal soft tissue abnormality. No acute abnormality of the thoracic spine.  **This report has been created using voice recognition software. It may contain minor errors which are inherent in voice recognition technology. ** Final report electronically signed by Dr. Love Lindo on 6/28/2021 10:06 AM    MRI BRAIN W WO CONTRAST    Addendum Date: 7/5/2021    PROCEDURE: MRI BRAIN W WO CONTRAST CLINICAL INFORMATIONPost craniotomy and resection of tumor. COMPARISON: CT scan of the brain dated 3 July 2021. Blanchie Bevels MRI scan of the brain dated July 1, 2021. TECHNIQUE: Multiplanar and multiple spin echo T1 and T2-weighted images were obtained through the brain before and after the administration of intravenous contrast. FINDINGS: The patient has undergone interval surgery and removal of the large mass in the right posterior temporal lobe is a residual 3.9 x 3.1 x 1.7 cm in the right posterior temporal lobe with increased T1 weighted signal intensity which may represent hemorrhage. There is moderate surrounding edema. There is mass effect significantly less marked than on previous study dated July 1, 2021. There is increased signal intensity in the white matter most likely representing ischemic changes The diffusion-weighted images are normal. The brain volume is normal.There are no intra-or extra-axial collections. On the FLAIR and T2-weighted sequences, there is normal signal intensity in the brain. On the gradient echo T2-weighted images, there is mineralization in the medial aspects of the basal ganglia. There is no abnormal enhancement in the brain. The major intracranial vascular flow voids are present. The midline craniocervical junction structures are normal.  The brainstem and pituitary gland are normal. There are postoperative changes in the right orbit. Result Date: 7/5/2021  PROCEDURE: MRI BRAIN W WO CONTRAST CLINICAL INFORMATIONPost craniotomy and resection of tumor. COMPARISON: CT scan of the brain dated 3 July 2021. Blanchie Bevels MRI scan of the brain dated July 1, 2021.  TECHNIQUE: Multiplanar and multiple spin echo T1 and T2-weighted images signed by DR Nicole Smart on 7/4/2021 1:35 PM    MRI BRAIN W WO CONTRAST    Result Date: 6/28/2021  PROCEDURE: MRI BRAIN W WO CONTRAST INDICATION:brain mass. Fall today. COMPARISON: CT head from the same date and MR brain dated 10/23/2009. TECHNIQUE: Multiplanar and multiple spin echo T1 and T2-weighted images were obtained through the brain before and after the administration of intravenous contrast. 15 mL ProHance was injected in the right AC. FINDINGS: There is an intra-axial multilobulated mass centered at the right occipital/temporal lobe junction measuring 5.5 x 3.9 cm in greatest axial dimensions. There are lobulated low T1, hyperintense T2 signal components centrally. There is thickened and nodular irregular peripheral enhancement following contrast administration. There are nodular enhancing septations centrally and inferiorly within the lesion. No other enhancing lesions are identified within the parenchyma. There is prominent T2/FLAIR prolongation adjacent to the lesion extending anteriorly into the temporal, parietal and frontal lobes and in the right basal ganglia, as well as the splenium of the corpus callosum. There is prominent mass effect on the occipital horn of the right lateral ventricle with near complete effacement. There is 9 millimeters leftward midline shift at the level of the foramen of [place]. There are moderate confluent and patchy areas of T2/FLAIR prolongation in the periventricular, subcortical deep white matter elsewhere. No focal areas of restricted diffusion are present. The major vascular flow voids appear patent. There is artifact from patient's right ocular implant. Patient is status post lens extraction on the left. Orbits are otherwise unremarkable. Paranasal sinuses and mastoid air cells are clear.       Heterogeneous 5.5 cm nodular peripherally enhancing cystic and solid mass centered at the right occipital/temporal lobe junction with prominent adjacent T2/FLAIR prolongation involving the corpus callosum, temporal, parietal and frontal lobes with prominent mass effect on the right lateral ventricle and 9 mm leftward midline shift. 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 **This report has been created using voice recognition software. It may contain minor errors which are inherent in voice recognition technology. ** Final report electronically signed by Dr. Leda Griffith MD on 6/28/2021 12:45 PM    A/P: S/P patient is seen and evaluated on the floor having transitioned from the ICU setting without incident. She is postoperative day #5 following craniotomy and resection of intercranial mass performed by Dr. Papi Barger, without complication. Examination and evaluation findings are reviewed and discussed with Dr. Papi Barger and with nursing. She is sitting up in a chair comfortably with dressings intact over flat dry incision and was without significant complaints that she remained stable and intact her baseline with no additional changes noted to the patient chart overnight.   Neurosurgery anticipates placement for inpatient rehabilitative care and recommends a follow-up with neurosurgery at 12 days postoperative for suture removal.    Electronically signed by Mckenzie Miguel PA-C on 7/7/2021 at 8:57 AM

## 2021-07-08 ENCOUNTER — APPOINTMENT (OUTPATIENT)
Dept: CT IMAGING | Age: 86
DRG: 025 | End: 2021-07-08
Payer: MEDICARE

## 2021-07-08 ENCOUNTER — HOSPITAL ENCOUNTER (INPATIENT)
Age: 86
LOS: 13 days | Discharge: HOME HEALTH | DRG: 949 | End: 2021-07-21
Attending: PHYSICAL MEDICINE & REHABILITATION | Admitting: PHYSICAL MEDICINE & REHABILITATION
Payer: MEDICARE

## 2021-07-08 VITALS
TEMPERATURE: 97.5 F | WEIGHT: 133.8 LBS | SYSTOLIC BLOOD PRESSURE: 152 MMHG | HEART RATE: 69 BPM | RESPIRATION RATE: 18 BRPM | HEIGHT: 60 IN | DIASTOLIC BLOOD PRESSURE: 69 MMHG | BODY MASS INDEX: 26.27 KG/M2 | OXYGEN SATURATION: 95 %

## 2021-07-08 DIAGNOSIS — C79.31 BREAST CANCER METASTASIZED TO BRAIN, RIGHT (HCC): Primary | ICD-10-CM

## 2021-07-08 DIAGNOSIS — I61.9 INTRAPARENCHYMAL HEMORRHAGE OF BRAIN (HCC): ICD-10-CM

## 2021-07-08 DIAGNOSIS — C78.7 CARCINOMA OF BREAST METASTATIC TO LIVER, UNSPECIFIED LATERALITY (HCC): ICD-10-CM
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
## 2021-07-08 DIAGNOSIS — C50.919 CARCINOMA OF BREAST METASTATIC TO LIVER, UNSPECIFIED LATERALITY (HCC): ICD-10-CM

## 2021-07-08 DIAGNOSIS — S06.6X0D SUBARACHNOID HEMORRHAGE FOLLOWING INJURY, NO LOSS OF CONSCIOUSNESS, SUBSEQUENT ENCOUNTER: ICD-10-CM

## 2021-07-08 DIAGNOSIS — G93.89 BRAIN MASS: ICD-10-CM
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
## 2021-07-08 DIAGNOSIS — C50.911 BREAST CANCER METASTASIZED TO BRAIN, RIGHT (HCC): Primary | ICD-10-CM

## 2021-07-08 DIAGNOSIS — Z86.73 H/O: CVA (CEREBROVASCULAR ACCIDENT): ICD-10-CM

## 2021-07-08 DIAGNOSIS — Z74.09 IMPAIRED FUNCTIONAL MOBILITY, BALANCE, GAIT, AND ENDURANCE: ICD-10-CM

## 2021-07-08 DIAGNOSIS — Z98.890 STATUS POST CRANIOTOMY: ICD-10-CM

## 2021-07-08 DIAGNOSIS — Z85.3 HX OF BREAST CANCER: ICD-10-CM

## 2021-07-08 DIAGNOSIS — H54.40 BLINDNESS OF RIGHT EYE, UNSPECIFIED LEFT EYE VISUAL IMPAIRMENT CATEGORY: ICD-10-CM

## 2021-07-08 DIAGNOSIS — S09.90XD CLOSED HEAD INJURY, SUBSEQUENT ENCOUNTER: ICD-10-CM

## 2021-07-08 DIAGNOSIS — F41.9 ANXIETY: ICD-10-CM

## 2021-07-08 LAB
ERYTHROCYTE [DISTWIDTH] IN BLOOD BY AUTOMATED COUNT: 14.2 % (ref 11.5–14.5)
ERYTHROCYTE [DISTWIDTH] IN BLOOD BY AUTOMATED COUNT: 50.9 FL (ref 35–45)
GLUCOSE BLD-MCNC: 107 MG/DL (ref 70–108)
GLUCOSE BLD-MCNC: 133 MG/DL (ref 70–108)
GLUCOSE BLD-MCNC: 99 MG/DL (ref 70–108)
HCT VFR BLD CALC: 39.5 % (ref 37–47)
HEMOGLOBIN: 11.9 GM/DL (ref 12–16)
MCH RBC QN AUTO: 29.4 PG (ref 26–33)
MCHC RBC AUTO-ENTMCNC: 30.1 GM/DL (ref 32.2–35.5)
MCV RBC AUTO: 97.5 FL (ref 81–99)
PLATELET # BLD: 151 THOU/MM3 (ref 130–400)
PMV BLD AUTO: 12 FL (ref 9.4–12.4)
RBC # BLD: 4.05 MILL/MM3 (ref 4.2–5.4)
WBC # BLD: 9.8 THOU/MM3 (ref 4.8–10.8)

## 2021-07-08 PROCEDURE — 85027 COMPLETE CBC AUTOMATED: CPT

## 2021-07-08 PROCEDURE — 82948 REAGENT STRIP/BLOOD GLUCOSE: CPT

## 2021-07-08 PROCEDURE — 97110 THERAPEUTIC EXERCISES: CPT

## 2021-07-08 PROCEDURE — 2580000003 HC RX 258: Performed by: PHYSICIAN ASSISTANT

## 2021-07-08 PROCEDURE — 6370000000 HC RX 637 (ALT 250 FOR IP): Performed by: PHYSICIAN ASSISTANT

## 2021-07-08 PROCEDURE — 6370000000 HC RX 637 (ALT 250 FOR IP): Performed by: STUDENT IN AN ORGANIZED HEALTH CARE EDUCATION/TRAINING PROGRAM

## 2021-07-08 PROCEDURE — 71260 CT THORAX DX C+: CPT

## 2021-07-08 PROCEDURE — 36415 COLL VENOUS BLD VENIPUNCTURE: CPT

## 2021-07-08 PROCEDURE — 6370000000 HC RX 637 (ALT 250 FOR IP): Performed by: EMERGENCY MEDICINE

## 2021-07-08 PROCEDURE — 6360000004 HC RX CONTRAST MEDICATION: Performed by: INTERNAL MEDICINE

## 2021-07-08 PROCEDURE — 97116 GAIT TRAINING THERAPY: CPT

## 2021-07-08 PROCEDURE — 6370000000 HC RX 637 (ALT 250 FOR IP): Performed by: PHYSICAL MEDICINE & REHABILITATION

## 2021-07-08 PROCEDURE — 1180000000 HC REHAB R&B

## 2021-07-08 PROCEDURE — 99223 1ST HOSP IP/OBS HIGH 75: CPT | Performed by: PHYSICAL MEDICINE & REHABILITATION

## 2021-07-08 PROCEDURE — 6360000002 HC RX W HCPCS: Performed by: STUDENT IN AN ORGANIZED HEALTH CARE EDUCATION/TRAINING PROGRAM

## 2021-07-08 PROCEDURE — 74177 CT ABD & PELVIS W/CONTRAST: CPT

## 2021-07-08 RX ORDER — ISOSORBIDE MONONITRATE 30 MG/1
30 TABLET, EXTENDED RELEASE ORAL DAILY
Status: CANCELLED | OUTPATIENT
Start: 2021-07-09

## 2021-07-08 RX ORDER — DOCUSATE SODIUM 100 MG/1
100 CAPSULE, LIQUID FILLED ORAL 2 TIMES DAILY
Status: DISCONTINUED | OUTPATIENT
Start: 2021-07-08 | End: 2021-07-21 | Stop reason: HOSPADM

## 2021-07-08 RX ORDER — SODIUM CHLORIDE 9 MG/ML
25 INJECTION, SOLUTION INTRAVENOUS PRN
Status: CANCELLED | OUTPATIENT
Start: 2021-07-08

## 2021-07-08 RX ORDER — SODIUM CHLORIDE 0.9 % (FLUSH) 0.9 %
5-40 SYRINGE (ML) INJECTION PRN
Status: CANCELLED | OUTPATIENT
Start: 2021-07-08

## 2021-07-08 RX ORDER — DEXTROSE MONOHYDRATE 25 G/50ML
12.5 INJECTION, SOLUTION INTRAVENOUS PRN
Status: DISCONTINUED | OUTPATIENT
Start: 2021-07-08 | End: 2021-07-21 | Stop reason: HOSPADM

## 2021-07-08 RX ORDER — POLYETHYLENE GLYCOL 3350 17 G/17G
17 POWDER, FOR SOLUTION ORAL DAILY
Status: DISCONTINUED | OUTPATIENT
Start: 2021-07-09 | End: 2021-07-21 | Stop reason: HOSPADM

## 2021-07-08 RX ORDER — ONDANSETRON 4 MG/1
4 TABLET, ORALLY DISINTEGRATING ORAL EVERY 8 HOURS PRN
Status: CANCELLED | OUTPATIENT
Start: 2021-07-08

## 2021-07-08 RX ORDER — LEVOTHYROXINE SODIUM 0.1 MG/1
100 TABLET ORAL DAILY
Status: CANCELLED | OUTPATIENT
Start: 2021-07-09

## 2021-07-08 RX ORDER — ONDANSETRON 2 MG/ML
4 INJECTION INTRAMUSCULAR; INTRAVENOUS EVERY 6 HOURS PRN
Status: CANCELLED | OUTPATIENT
Start: 2021-07-08

## 2021-07-08 RX ORDER — ALPRAZOLAM 0.25 MG/1
0.25 TABLET ORAL 3 TIMES DAILY PRN
Status: CANCELLED | OUTPATIENT
Start: 2021-07-08

## 2021-07-08 RX ORDER — DEXTROSE MONOHYDRATE 25 G/50ML
12.5 INJECTION, SOLUTION INTRAVENOUS PRN
Status: CANCELLED | OUTPATIENT
Start: 2021-07-08

## 2021-07-08 RX ORDER — LEVETIRACETAM 500 MG/1
500 TABLET ORAL 2 TIMES DAILY
Status: DISCONTINUED | OUTPATIENT
Start: 2021-07-08 | End: 2021-07-08 | Stop reason: HOSPADM

## 2021-07-08 RX ORDER — LEVETIRACETAM 500 MG/1
500 TABLET ORAL 2 TIMES DAILY
Status: DISCONTINUED | OUTPATIENT
Start: 2021-07-08 | End: 2021-07-21 | Stop reason: HOSPADM

## 2021-07-08 RX ORDER — ONDANSETRON 2 MG/ML
4 INJECTION INTRAMUSCULAR; INTRAVENOUS EVERY 6 HOURS PRN
Status: DISCONTINUED | OUTPATIENT
Start: 2021-07-08 | End: 2021-07-08 | Stop reason: SDUPTHER

## 2021-07-08 RX ORDER — ONDANSETRON 4 MG/1
4 TABLET, ORALLY DISINTEGRATING ORAL EVERY 8 HOURS PRN
Status: DISCONTINUED | OUTPATIENT
Start: 2021-07-08 | End: 2021-07-08 | Stop reason: SDUPTHER

## 2021-07-08 RX ORDER — ACETAMINOPHEN 325 MG/1
650 TABLET ORAL EVERY 4 HOURS PRN
Status: DISCONTINUED | OUTPATIENT
Start: 2021-07-08 | End: 2021-07-21 | Stop reason: HOSPADM

## 2021-07-08 RX ORDER — SODIUM CHLORIDE 9 MG/ML
25 INJECTION, SOLUTION INTRAVENOUS PRN
Status: DISCONTINUED | OUTPATIENT
Start: 2021-07-08 | End: 2021-07-09

## 2021-07-08 RX ORDER — HYDRALAZINE HYDROCHLORIDE 25 MG/1
25 TABLET, FILM COATED ORAL EVERY 8 HOURS SCHEDULED
Status: CANCELLED | OUTPATIENT
Start: 2021-07-08

## 2021-07-08 RX ORDER — ACETAMINOPHEN 325 MG/1
650 TABLET ORAL EVERY 4 HOURS PRN
Status: CANCELLED | OUTPATIENT
Start: 2021-07-08

## 2021-07-08 RX ORDER — LIDOCAINE 4 G/G
2 PATCH TOPICAL DAILY
Status: CANCELLED | OUTPATIENT
Start: 2021-07-09

## 2021-07-08 RX ORDER — LEVOTHYROXINE SODIUM 0.1 MG/1
100 TABLET ORAL DAILY
Status: DISCONTINUED | OUTPATIENT
Start: 2021-07-09 | End: 2021-07-21 | Stop reason: HOSPADM

## 2021-07-08 RX ORDER — POLYETHYLENE GLYCOL 3350 17 G/17G
17 POWDER, FOR SOLUTION ORAL DAILY
Status: CANCELLED | OUTPATIENT
Start: 2021-07-09

## 2021-07-08 RX ORDER — NICOTINE POLACRILEX 4 MG
15 LOZENGE BUCCAL PRN
Status: CANCELLED | OUTPATIENT
Start: 2021-07-08

## 2021-07-08 RX ORDER — ALPRAZOLAM 0.25 MG/1
0.25 TABLET ORAL 3 TIMES DAILY PRN
Status: DISCONTINUED | OUTPATIENT
Start: 2021-07-08 | End: 2021-07-08 | Stop reason: HOSPADM

## 2021-07-08 RX ORDER — DEXTROSE MONOHYDRATE 50 MG/ML
100 INJECTION, SOLUTION INTRAVENOUS PRN
Status: CANCELLED | OUTPATIENT
Start: 2021-07-08

## 2021-07-08 RX ORDER — LEVETIRACETAM 500 MG/1
500 TABLET ORAL 2 TIMES DAILY
Status: CANCELLED | OUTPATIENT
Start: 2021-07-08

## 2021-07-08 RX ORDER — LIDOCAINE 4 G/G
2 PATCH TOPICAL DAILY
Status: DISCONTINUED | OUTPATIENT
Start: 2021-07-09 | End: 2021-07-21 | Stop reason: HOSPADM

## 2021-07-08 RX ORDER — NICOTINE POLACRILEX 4 MG
15 LOZENGE BUCCAL PRN
Status: DISCONTINUED | OUTPATIENT
Start: 2021-07-08 | End: 2021-07-21 | Stop reason: HOSPADM

## 2021-07-08 RX ORDER — DOCUSATE SODIUM 100 MG/1
100 CAPSULE, LIQUID FILLED ORAL 2 TIMES DAILY
Status: CANCELLED | OUTPATIENT
Start: 2021-07-08

## 2021-07-08 RX ORDER — DEXAMETHASONE 1.5 MG/1
3 TABLET ORAL EVERY 8 HOURS SCHEDULED
Status: DISCONTINUED | OUTPATIENT
Start: 2021-07-08 | End: 2021-07-08 | Stop reason: HOSPADM

## 2021-07-08 RX ORDER — SODIUM CHLORIDE 0.9 % (FLUSH) 0.9 %
5-40 SYRINGE (ML) INJECTION EVERY 12 HOURS SCHEDULED
Status: CANCELLED | OUTPATIENT
Start: 2021-07-08

## 2021-07-08 RX ORDER — DEXAMETHASONE 1.5 MG/1
3 TABLET ORAL EVERY 8 HOURS SCHEDULED
Status: DISCONTINUED | OUTPATIENT
Start: 2021-07-08 | End: 2021-07-11

## 2021-07-08 RX ORDER — ONDANSETRON 2 MG/ML
4 INJECTION INTRAMUSCULAR; INTRAVENOUS EVERY 6 HOURS PRN
Status: DISCONTINUED | OUTPATIENT
Start: 2021-07-08 | End: 2021-07-09

## 2021-07-08 RX ORDER — HYDRALAZINE HYDROCHLORIDE 20 MG/ML
10 INJECTION INTRAMUSCULAR; INTRAVENOUS EVERY 8 HOURS PRN
Status: DISCONTINUED | OUTPATIENT
Start: 2021-07-08 | End: 2021-07-09

## 2021-07-08 RX ORDER — HYDRALAZINE HYDROCHLORIDE 25 MG/1
25 TABLET, FILM COATED ORAL EVERY 8 HOURS SCHEDULED
Status: DISCONTINUED | OUTPATIENT
Start: 2021-07-08 | End: 2021-07-21 | Stop reason: HOSPADM

## 2021-07-08 RX ORDER — DEXTROSE MONOHYDRATE 50 MG/ML
100 INJECTION, SOLUTION INTRAVENOUS PRN
Status: DISCONTINUED | OUTPATIENT
Start: 2021-07-08 | End: 2021-07-21 | Stop reason: HOSPADM

## 2021-07-08 RX ORDER — ISOSORBIDE MONONITRATE 30 MG/1
30 TABLET, EXTENDED RELEASE ORAL DAILY
Status: DISCONTINUED | OUTPATIENT
Start: 2021-07-09 | End: 2021-07-21 | Stop reason: HOSPADM

## 2021-07-08 RX ORDER — ALPRAZOLAM 0.25 MG/1
0.25 TABLET ORAL 3 TIMES DAILY PRN
Status: DISCONTINUED | OUTPATIENT
Start: 2021-07-08 | End: 2021-07-21 | Stop reason: HOSPADM

## 2021-07-08 RX ORDER — SODIUM PHOSPHATE, DIBASIC AND SODIUM PHOSPHATE, MONOBASIC 7; 19 G/133ML; G/133ML
1 ENEMA RECTAL DAILY PRN
Status: CANCELLED | OUTPATIENT
Start: 2021-07-08

## 2021-07-08 RX ORDER — ATORVASTATIN CALCIUM 10 MG/1
10 TABLET, FILM COATED ORAL NIGHTLY
Status: CANCELLED | OUTPATIENT
Start: 2021-07-08

## 2021-07-08 RX ORDER — PANTOPRAZOLE SODIUM 40 MG/1
40 TABLET, DELAYED RELEASE ORAL
Status: CANCELLED | OUTPATIENT
Start: 2021-07-09

## 2021-07-08 RX ORDER — DEXAMETHASONE 1.5 MG/1
3 TABLET ORAL EVERY 8 HOURS SCHEDULED
Status: CANCELLED | OUTPATIENT
Start: 2021-07-08

## 2021-07-08 RX ORDER — SODIUM CHLORIDE 0.9 % (FLUSH) 0.9 %
5-40 SYRINGE (ML) INJECTION PRN
Status: DISCONTINUED | OUTPATIENT
Start: 2021-07-08 | End: 2021-07-09

## 2021-07-08 RX ORDER — HYDRALAZINE HYDROCHLORIDE 20 MG/ML
10 INJECTION INTRAMUSCULAR; INTRAVENOUS EVERY 8 HOURS PRN
Status: CANCELLED | OUTPATIENT
Start: 2021-07-08

## 2021-07-08 RX ORDER — SODIUM CHLORIDE 0.9 % (FLUSH) 0.9 %
5-40 SYRINGE (ML) INJECTION EVERY 12 HOURS SCHEDULED
Status: DISCONTINUED | OUTPATIENT
Start: 2021-07-08 | End: 2021-07-09

## 2021-07-08 RX ORDER — ATORVASTATIN CALCIUM 10 MG/1
10 TABLET, FILM COATED ORAL NIGHTLY
Status: DISCONTINUED | OUTPATIENT
Start: 2021-07-08 | End: 2021-07-21 | Stop reason: HOSPADM

## 2021-07-08 RX ORDER — PANTOPRAZOLE SODIUM 40 MG/1
40 TABLET, DELAYED RELEASE ORAL
Status: DISCONTINUED | OUTPATIENT
Start: 2021-07-09 | End: 2021-07-21 | Stop reason: HOSPADM

## 2021-07-08 RX ORDER — ONDANSETRON 4 MG/1
4 TABLET, ORALLY DISINTEGRATING ORAL EVERY 8 HOURS PRN
Status: DISCONTINUED | OUTPATIENT
Start: 2021-07-08 | End: 2021-07-21 | Stop reason: HOSPADM

## 2021-07-08 RX ORDER — SODIUM PHOSPHATE, DIBASIC AND SODIUM PHOSPHATE, MONOBASIC 7; 19 G/133ML; G/133ML
1 ENEMA RECTAL DAILY PRN
Status: DISCONTINUED | OUTPATIENT
Start: 2021-07-08 | End: 2021-07-21 | Stop reason: HOSPADM

## 2021-07-08 RX ADMIN — LEVOTHYROXINE SODIUM 100 MCG: 0.1 TABLET ORAL at 06:10

## 2021-07-08 RX ADMIN — ALPRAZOLAM 0.25 MG: 0.25 TABLET ORAL at 09:04

## 2021-07-08 RX ADMIN — DEXAMETHASONE 3 MG: 1.5 TABLET ORAL at 22:38

## 2021-07-08 RX ADMIN — HYDRALAZINE HYDROCHLORIDE 25 MG: 25 TABLET ORAL at 22:37

## 2021-07-08 RX ADMIN — DOCUSATE SODIUM 100 MG: 100 CAPSULE ORAL at 22:38

## 2021-07-08 RX ADMIN — LEVETIRACETAM 500 MG: 500 TABLET, FILM COATED ORAL at 09:06

## 2021-07-08 RX ADMIN — HYDRALAZINE HYDROCHLORIDE 25 MG: 25 TABLET, FILM COATED ORAL at 06:10

## 2021-07-08 RX ADMIN — PANTOPRAZOLE SODIUM 40 MG: 40 TABLET, DELAYED RELEASE ORAL at 06:10

## 2021-07-08 RX ADMIN — ATORVASTATIN CALCIUM 10 MG: 10 TABLET, FILM COATED ORAL at 22:38

## 2021-07-08 RX ADMIN — LEVETIRACETAM 500 MG: 500 TABLET, FILM COATED ORAL at 22:37

## 2021-07-08 RX ADMIN — SERTRALINE 50 MG: 50 TABLET, FILM COATED ORAL at 22:37

## 2021-07-08 RX ADMIN — DOCUSATE SODIUM 100 MG: 100 CAPSULE, LIQUID FILLED ORAL at 09:03

## 2021-07-08 RX ADMIN — HYDROCODONE BITARTRATE AND ACETAMINOPHEN 1 TABLET: 5; 325 TABLET ORAL at 09:04

## 2021-07-08 RX ADMIN — SODIUM CHLORIDE, PRESERVATIVE FREE 10 ML: 5 INJECTION INTRAVENOUS at 09:05

## 2021-07-08 RX ADMIN — POLYETHYLENE GLYCOL (3350) 17 G: 17 POWDER, FOR SOLUTION ORAL at 09:05

## 2021-07-08 RX ADMIN — ALPRAZOLAM 0.25 MG: 0.25 TABLET ORAL at 22:38

## 2021-07-08 RX ADMIN — SERTRALINE 50 MG: 50 TABLET, FILM COATED ORAL at 09:04

## 2021-07-08 RX ADMIN — ACETAMINOPHEN 650 MG: 325 TABLET ORAL at 22:39

## 2021-07-08 RX ADMIN — ISOSORBIDE MONONITRATE 30 MG: 30 TABLET ORAL at 09:04

## 2021-07-08 RX ADMIN — IOPAMIDOL 80 ML: 755 INJECTION, SOLUTION INTRAVENOUS at 08:30

## 2021-07-08 RX ADMIN — DEXAMETHASONE SODIUM PHOSPHATE 3 MG: 4 INJECTION, SOLUTION INTRA-ARTICULAR; INTRALESIONAL; INTRAMUSCULAR; INTRAVENOUS; SOFT TISSUE at 06:11

## 2021-07-08 ASSESSMENT — PAIN SCALES - GENERAL
PAINLEVEL_OUTOF10: 8
PAINLEVEL_OUTOF10: 5
PAINLEVEL_OUTOF10: 4
PAINLEVEL_OUTOF10: 0

## 2021-07-08 ASSESSMENT — PAIN DESCRIPTION - LOCATION: LOCATION: HEAD

## 2021-07-08 ASSESSMENT — PAIN DESCRIPTION - PAIN TYPE: TYPE: ACUTE PAIN

## 2021-07-08 NOTE — CARE COORDINATION
7/8/21, 10:12 AM EDT    Patient goals/plan/ treatment preferences discussed by  and . Patient goals/plan/ treatment preferences reviewed with patient/ family. Patient/ family verbalize understanding of discharge plan and are in agreement with goal/plan/treatment preferences. Understanding was demonstrated using the teach back method. AVS provided by RN at time of discharge, which includes all necessary medical information pertaining to the patients current course of illness, treatment, post-discharge goals of care, and treatment preferences. IMM Letter  IMM Letter given to Patient/Family/Significant other/Guardian/POA/by[de-identified] PAOLA  IMM Letter date given[de-identified] 07/08/21  IMM Letter time given[de-identified] Fransisco Wiggins will be discharged to [facility] today. Spoke with her this morning and she did not voice any concerns about the discharge. Spoke with Maria E Santos [phone], Montrell GUEVARA and [facility] to make them both aware patient is going to [facility].

## 2021-07-08 NOTE — PROGRESS NOTES
[address] [address]  Acute Inpatient Rehab Preadmission Assessment    Patient Name: Barrett Tong        MRN: [id number]    : 1933  (80 y.o.)  Gender: female     Admitted from:[address]  Initial Assessment    Date of admission to the hospital: 2021  9:03 AM  Date patient eligible for admission:2021    Primary Diagnosis: S/P craniotomy      Did patient have surgery? yes - CRANIOTOMY FOR RESECTION/DEBULKING OF RIGHT SIDE INTRA BRAIN TUMOR         Physicians: Chandni Arnold MD, Dr. Julio Mayen, Dr. Mil Wills, Dr. Digna Cochran for clinical complications/co-morbidities:   Past Medical History:   Diagnosis Date    Anxiety     Blind right eye     Breast cancer metastasized to liver [facility]) 05/10/2016    Liver lesion noted     Carotid stenosis      Left ICA 25%    Colostomy in place ([facility] 2016    Degenerative arthritis of cervical spine     GERD (gastroesophageal reflux disease)     Hypercholesteremia     Hypoestrogenism     Hypothyroidism     Lumbago     Lumbosacral spinal stenosis 2019    MDRO (multiple drug resistant organisms) resistance     pt stated cleared    Personal history of cardiac dysrhythmia     Sigmoid diverticulosis     Spondylolisthesis of lumbosacral region     Steroid-induced hyperglycemia     Subclavian artery stenosis (HCC)     left    Superior and Inferior Pubic ramus fracture, right, closed, initial encounter ([facility] [facility] 2019    SVT (supraventricular tachycardia) ([facility]     Temporal arteritis (HCC)     Vertebral artery occlusion     left    Vitamin D deficiency 2017       Financial Information  Primary insurance: Medicare    Secondary Insurance:   . Has the patient had two or more falls in the past year or any fall with injury in the past year? yes    Did the patient have major surgery during the 100 days prior to admission?   yes    Precautions:   falls  Restrictions/Precautions: General Precautions, Fall Risk  Other position/activity restrictions: L neglect, R artificial eye    Isolation Precautions: None       Physiatrist: Dr. Alysha Ashby    Patients Occupation: Retired  Reviewed Lab and Diagnostic reports from Current Admission: Yes    Patients Prior Functional  Level: Prior Function  Receives Help From: Personal care attendant  ADL Assistance: Needs assistance  Homemaking Assistance: Needs assistance  Ambulation Assistance: Independent  Transfer Assistance: Independent  Additional Comments: 2 hours/day DIVINA has an aide that assists with ADLs/IADLs. nieces live nearby and able to help at times. Current functional status for upper extremity ADLs: contact guard assistance    Current functional status for lower extremity ADLs: contact guard assistance    Current functional status for bed, chair, wheelchair transfers: contact guard assistance    Current functional status for toilet transfers: contact guard assistance    Current functional status for locomotion:Contact Guard Assistance, with verbal cues , with increased time for completion  Distance: 15'x1  Surface: Level Tile  Device:Rolling Walker  Gait Deviations: Forward Flexed Posture, Slow Gillian, Decreased Step Length Bilaterally, Decreased Gait Speed and Decreased Heel Strike Bilaterally  L sided neglect with pt bumping into objects on left side.    Cues for posture        Current functional status for bladder management: Modified independence    Current functional status for bowel management:Modified independence    Current functional status for comprehension: Modified independence    Current functional status for expression: Modified independence    Current functional status for social interaction: Complete independence    Current functional status for problem solving: Minimal contact assistance    Current functional status for memory: Minimal contact assistance    Expected level of Improvement in Self-Care:  Complete independence    Expected level of Improvement in concur with the findings and results of the pre-admission screening assessment completed by the Inpatient Rehabilitation Admissions Coordinator.           Terri Sharma M.D.

## 2021-07-08 NOTE — PROGRESS NOTES
Admitted to the Inpatient Rehabilitation Unit via wheelchair. Patient was then oriented to room and unit. Education provided on the rehabilitation routine: three hours of therapy five days per week and dining room for lunch. Explained patients right to have family, representative or physician notified of their admission. Patient has Declined for physician to be notified. Patient has Declined for family/representative to be notified. Admitting medication orders compared with acute stay medications; home medication list reviewed with patient/family. Medication issues identified No  Medication issue: None  If yes, physician notified Dr Lynne Redman. Bladder and Bowel Function Assessment:  1. Prior history of bladder problems: stress incontinence  2. Number of pads used per day: 3  3. Frequency of night time voiding: twice  4. Fluid intake volume and pattern: adequate   5. Last BM: 7/7/21  6. Bowel problems (prior or current) No   Yes    Incontinence   no   Frequent diarrhea   no   No BM in 3 days this stay or history of constipation   no   Hemorrhoids   no   Diverticulitis   no   Bowel Surgery     Two nurse skin assessment performed by Lew Khalil LPN  and Gabi RN . Weight:       Care plan was created with patient's input and goals were agreed upon. Admission folder provided with education regarding patients diagnoses, fall prevention, skin care, and M in the box. \"Data Collection Information Summary for Patients in Inpatient Rehabilitation Facilities\" and \"Privacy Act Statement - Health Care Records\" provided. Please refer to the admission navigator for further information.

## 2021-07-08 NOTE — PROGRESS NOTES
[facility]  Hematology/ Oncology Progress Note     Pt Name: Henry Robertson  MRN: [id number]  [id number]  YOB: 1933  Admit Date: 6/28/2021  9:03 AM  Date of evaluation: 7/8/2021  Primary Care Physician: Emily Ghotra MD   4A-10/010-A       SUBJECTIVE:  Mild headache. OBJECTIVE    Physical  VITALS:  BP (!) 168/61   Pulse 64   Temp 98.7 °F (37.1 °C) (Oral)   Resp 12   Ht 5' (1.524 m)   Wt 133 lb 12.8 oz (60.7 kg)   LMP  (LMP Unknown)   SpO2 97%   BMI 26.13 kg/m²   CONSTITUTIONAL:  awake  EYES:  ENT:  normocepalic, without obvious abnormality. Surgical wound on scalp.   NECK:  supple, symmetrical, trachea midline  HEMATOLOGIC/LYMPHATICS:  no cervical lymphadenopathy and no supraclavicular lymphadenopathy  LUNGS:  No increased work of breathing, good air exchange, clear to auscultation bilaterally, no crackles or wheezing  CARDIOVASCULAR:  normal apical pulses  ABDOMEN:  Soft Non Tender  Data  Labs:  General Labs:    CBC:   Lab Results   Component Value Date    WBC 9.8 07/08/2021    RBC 4.05 07/08/2021    RBC 4.23 04/08/2019    HGB 11.9 07/08/2021    HCT 39.5 07/08/2021    MCV 97.5 07/08/2021    MCH 29.4 07/08/2021    MCHC 30.1 07/08/2021    RDW 17.0 04/08/2019     07/08/2021    MPV 12.0 07/08/2021     CMP:    Lab Results   Component Value Date     07/06/2021    K 4.0 07/06/2021    K 3.8 07/06/2021     07/06/2021    CO2 26 07/06/2021    BUN 20 07/06/2021    CREATININE 0.4 07/06/2021    AGRATIO 1.7 07/23/2018    LABGLOM >90 07/06/2021    GLUCOSE 90 07/06/2021    GLUCOSE 103 07/23/2018    PROT 5.8 07/06/2021    LABALBU 4.0 07/06/2021    CALCIUM 8.9 07/06/2021    BILITOT 0.5 07/06/2021    BILITOT NEGATIVE 11/13/2018    ALKPHOS 53 07/06/2021    AST 78 07/06/2021    ALT 81 07/06/2021     BMP:    Lab Results   Component Value Date     07/06/2021    K 4.0 07/06/2021    K 3.8 07/06/2021     07/06/2021    CO2 26 07/06/2021    BUN 20 07/06/2021 LABALBU 4.0 07/06/2021    CREATININE 0.4 07/06/2021    CALCIUM 8.9 07/06/2021    LABGLOM >90 07/06/2021    GLUCOSE 90 07/06/2021    GLUCOSE 103 07/23/2018     Current Medications  Current Facility-Administered Medications: levETIRAcetam (KEPPRA) tablet 500 mg, 500 mg, Oral, BID  dexamethasone (DECADRON) tablet 3 mg, 3 mg, Oral, 3 times per day  ALPRAZolam (XANAX) tablet 0.25 mg, 0.25 mg, Oral, TID PRN  hydrALAZINE (APRESOLINE) tablet 25 mg, 25 mg, Oral, 3 times per day  sodium chloride flush 0.9 % injection 5-40 mL, 5-40 mL, Intravenous, 2 times per day  sodium chloride flush 0.9 % injection 5-40 mL, 5-40 mL, Intravenous, PRN  0.9 % sodium chloride infusion, 25 mL, Intravenous, PRN  ondansetron (ZOFRAN-ODT) disintegrating tablet 4 mg, 4 mg, Oral, Q8H PRN **OR** ondansetron (ZOFRAN) injection 4 mg, 4 mg, Intravenous, Q6H PRN  HYDROcodone-acetaminophen (NORCO) 5-325 MG per tablet 1 tablet, 1 tablet, Oral, Q6H PRN  HYDROcodone-acetaminophen (NORCO) 5-325 MG per tablet 1 tablet, 1 tablet, Oral, Q6H PRN  hydrALAZINE (APRESOLINE) injection 10 mg, 10 mg, Intravenous, Q8H PRN  isosorbide mononitrate (IMDUR) extended release tablet 30 mg, 30 mg, Oral, Daily  levothyroxine (SYNTHROID) tablet 100 mcg, 100 mcg, Oral, Daily  pantoprazole (PROTONIX) tablet 40 mg, 40 mg, Oral, QAM AC  sertraline (ZOLOFT) tablet 50 mg, 50 mg, Oral, BID  atorvastatin (LIPITOR) tablet 10 mg, 10 mg, Oral, Nightly  ondansetron (ZOFRAN-ODT) disintegrating tablet 4 mg, 4 mg, Oral, Q8H PRN **OR** ondansetron (ZOFRAN) injection 4 mg, 4 mg, Intravenous, Q6H PRN  polyethylene glycol (GLYCOLAX) packet 17 g, 17 g, Oral, Daily  fleet rectal enema 1 enema, 1 enema, Rectal, Daily PRN  lidocaine 4 % external patch 2 patch, 2 patch, Topical, Daily  insulin lispro (HUMALOG) injection vial 0-6 Units, 0-6 Units, Subcutaneous, TID WC  insulin lispro (HUMALOG) injection vial 0-3 Units, 0-3 Units, Subcutaneous, Nightly  glucose (GLUTOSE) 40 % oral gel 15 g, 15 g, Oral, PRN  dextrose 50 % IV solution, 12.5 g, Intravenous, PRN  glucagon (rDNA) injection 1 mg, 1 mg, Intramuscular, PRN  dextrose 5 % solution, 100 mL/hr, Intravenous, PRN  docusate sodium (COLACE) capsule 100 mg, 100 mg, Oral, BID    ASSESSMENT AND PLAN  Patient Active Problem List:     GERD (gastroesophageal reflux disease)     Carotid stenosis     Hypercholesteremia     Hypothyroidism due to acquired atrophy of thyroid     Steroid-induced hyperglycemia     Blind right eye     Current chronic use of systemic steroids     Personal history of cardiac dysrhythmia     Coronary artery disease involving native coronary artery of native heart without angina pectoris     Anxiety     H/O: CVA (cerebrovascular accident)     Lumbosacral spinal stenosis     Vitamin D deficiency     Hx of breast cancer with liver mets     Breast cancer metastasized to liver [facility])     Fall     Traumatic ecchymosis of forehead     Brain mass     Subarachnoid hemorrhage following injury, no loss of consciousness (HCC)     Traumatic ecchymosis of left lower leg     Traumatic ecchymosis of left forearm     Platelet inhibition due to Plavix     Fall at home, initial encounter     Brain edema ([address]     Intraparenchymal hemorrhage of brain ([address]     Closed head injury     Breast cancer metastasized to brain, right ([address]     Status post craniotomy     Impaired functional mobility, balance, gait, and endurance    A 1 Brain metastasis S/P Craniotomy and resection of Single met. 2 Breast Cancer and Liver mets. Responded to treatment. Last chemo in 2019. 3 Elevated Liver enzymes. Plan[de-identified] CT chest, Abdomen and  Pelvis today. ER, WI and HER 2 on tissue are pending.              INÉS Clifton,CWS  8:06 AM  7/8/2021

## 2021-07-08 NOTE — PROGRESS NOTES
Family Medicine Progress Notes/Coverage    Today's Date: 7/8/21  4A-10/010-A  Medical Record # [id number]  CSN/Account # [de-identified]      Ms. Rod admitted on 6/28/2021        Subjective / Interval History :     Get anxious thinking of her condition  Mild HA on the right temporal area  Reviewed notes, consults, laboratory and radiology results,    Objective:       Physical Exam:  Patient Vitals for the past 24 hrs:   BP Temp Temp src Pulse Resp SpO2 Weight   07/08/21 0410 (!) 168/61         07/08/21 0334  98.7 °F (37.1 °C) Oral 64 12 97 % 133 lb 12.8 oz (60.7 kg)   07/07/21 2133 (!) 155/70 97.7 °F (36.5 °C) Oral 65 18 94 %    07/07/21 1620 (!) 117/56 97.7 °F (36.5 °C) Oral 66 16 95 %    07/07/21 1420 (!) 131/93         07/07/21 1126 (!) 165/70 97.9 °F (36.6 °C) Oral 73 16 97 %    07/07/21 0916 (!) 148/65 97.8 °F (36.6 °C) Oral 71 16 96 %        General Appearance:  Alert, cooperative, no distress, appears stated age   HEENT:  Neck:      Chest/Lungs:  Heart: CTA  RRR   Abdomen:  Back: soft   Extremities:  Neurological Exam: No edema  WNL       Assessment:     Admitting Diagnosis:    Fall at home, initial encounter [W19. Faina Clark, S06.442]    Active Hospital Problems    Diagnosis Date Noted    Impaired functional mobility, balance, gait, and endurance [Z74.09] 07/08/2021     Priority: High    Breast cancer metastasized to brain, right ([address] [C50.911, C79.31] 07/06/2021     Priority: High    Status post craniotomy [Z98.890] 07/02/2021     Priority: High    Fall [W19. XXXA] 06/28/2021     Priority: High    Subarachnoid hemorrhage following injury, no loss of consciousness ([address] [S06.6X0A] 06/28/2021     Priority: High    Traumatic ecchymosis of left lower leg [S80.12XA] 06/28/2021     Priority: Medium    Traumatic ecchymosis of left forearm [S50.12XA] 06/28/2021 Priority: Medium    Platelet inhibition due to Plavix [Z79.02] 06/28/2021     Priority: Medium    Fall at home, initial encounter [W19. Catie Christensen, J16.752] 06/28/2021     Priority: Medium    Hx of breast cancer with liver mets [Z85.3] 08/13/2020     Priority: Medium    Current chronic use of systemic steroids [Z79.52] 09/04/2018     Priority: Medium    Coronary artery disease involving native coronary artery of native heart without angina pectoris [I25.10] 11/06/2017     Priority: Medium    Anxiety [F41.9] 06/29/2016     Priority: Medium    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]      Priority: Medium    Hypothyroidism due to acquired atrophy of thyroid [E03.4] 03/03/2016     Priority: Medium    GERD (gastroesophageal reflux disease) [K21.9]      Priority: Medium    Closed head injury [S09.90XA]     Intraparenchymal hemorrhage of brain ([address] [I61.9]     Brain edema ([address] [G93.6]     Traumatic ecchymosis of forehead [S00.83XA] 06/28/2021    Brain mass [G93.89] 06/28/2021       Plan:     Discussed plans with the nursing staff  Change meds to oral  Rehab pending Radiation Ocncology consult    Medications, Laboratories and Imaging results:    Scheduled Meds:   levETIRAcetam  500 mg Oral BID    dexamethasone  3 mg Oral 3 times per day    hydrALAZINE  25 mg Oral 3 times per day    sodium chloride flush  5-40 mL Intravenous 2 times per day    isosorbide mononitrate  30 mg Oral Daily    levothyroxine  100 mcg Oral Daily    pantoprazole  40 mg Oral QAM AC    sertraline  50 mg Oral BID    atorvastatin  10 mg Oral Nightly    polyethylene glycol  17 g Oral Daily    lidocaine  2 patch Topical Daily    insulin lispro  0-6 Units Subcutaneous TID WC    insulin lispro  0-3 Units Subcutaneous Nightly    docusate sodium  100 mg Oral BID     Continuous Infusions:   sodium chloride      dextrose       PRN Meds:sodium chloride flush, sodium chloride, ondansetron **OR** ondansetron, HYDROcodone 5 mg - acetaminophen, HYDROcodone 5 mg - acetaminophen, hydrALAZINE, ondansetron **OR** ondansetron, fleet, glucose, dextrose, glucagon (rDNA), dextrose    Imaging:    Lab Review :    Lab Results   Component Value Date    WBC 9.8 07/08/2021    HGB 11.9 (L) 07/08/2021    HCT 39.5 07/08/2021    MCV 97.5 07/08/2021     07/08/2021     Lab Results   Component Value Date    CREATININE 0.4 07/06/2021    BUN 20 07/06/2021     07/06/2021    K 4.0 07/06/2021     07/06/2021    CO2 26 07/06/2021     Lab Results   Component Value Date    CKTOTAL 70 06/28/2021     Lab Results   Component Value Date    ALT 81 (H) 07/06/2021    AST 78 (H) 07/06/2021    ALKPHOS 53 07/06/2021    BILITOT 0.5 07/06/2021     Lab Results   Component Value Date    LIPASE 12.6 08/12/2020         Electronically signed by Chandni Arnold MD on 7/8/21 at 7:11 AM AMELIA Carlin M.D.

## 2021-07-08 NOTE — PROGRESS NOTES
[address] [address]  INPATIENT PHYSICAL THERAPY  DAILY NOTE  [facility] NEUROSCIENCES 4A - 4A-10/010-A    Time In: 5121  Time Out: 0477  Timed Code Treatment Minutes: 23 Minutes  Minutes: 23          Date: 2021  Patient Name: Acosta Lai,  Gender:  female        MRN: [id number]  : 1933  (80 y.o.)     Referring Practitioner: PAMELA Munguia CNP  Diagnosis: Fall at home  Additional Pertinent Hx: Santino Kurtz is an 80year old female presenting to the Emergency Department via EMS for evaluation of potential injuries sustained when she fell in her home this morning around 5 am.  She reports that she was going back to her bed from her bathroom when her legs became weak and she fell. She is not sure if she hit her head on the nightstand but she states that the lamp that was on the nightstand fell on top of her, hitting the right side of her forehead/eye. She denies loss of consciousness. She was found by her home health aide around 0800 as she was not able to get herself up after falling. EMS reports that they have been to [facility] home multiple times over the last few weeks for lift assist but she refuses to go to the hospital for evaluation. Santino Kurtz reports that her legs have been progressively becoming more weak. She denies headache, neck pain, chest pain, abdominal pain. She does admit to lower back pain and lower extremity weakness. No visual changes, lightheadedness or dizziness, nausea or vomiting. She denies any feelings of syncope prior to falling. 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      Prior Level of Function:  Lives With: Alone  Type of Home: House  Home Layout: One level  Home Access: Level entry  [facility]: [facility], 4 wheeled walker, Cane, Lift chair, Alert Button   Bathroom Shower/Tub: Tub/Shower unit, Shower chair with back  [employer]: Standard (with BSC over toilet)  Bathroom Equipment: Shower chair, 3-in-1 commode    Receives Help From: Personal care attendant  ADL Assistance: Needs assistance  Homemaking Assistance: Needs assistance  Homemaking Responsibilities: Yes  Ambulation Assistance: Independent  Transfer Assistance: Independent  Active : No  Additional Comments: 2 hours/day DIVINA has an aide that assists with ADLs/IADLs. nieces live nearby and able to help at times. Restrictions/Precautions:  Restrictions/Precautions: General Precautions, Fall Risk  Position Activity Restriction  Other position/activity restrictions: L neglect, R artificial eye     SUBJECTIVE: RN approved session. Pt in bed with niece upon arrival. Pt pleasant and agreeable to session. This PTA transferred this pt to [facility] at end of session with nurse in room to accept pt. Pt slightly confused at times during session    PAIN: none indicated    Vitals: Vitals not assessed per clinical judgement, see nursing flowsheet    OBJECTIVE:  Bed Mobility:  Supine to Sit: Contact Guard Assistance  Sit to Supine: Contact Guard Assistance     Transfers:  Sit to Stand: [employer], with increased time for completion  Stand to [address] [id number], cues for hand placement  Cues for alignment and bringing walker all the way back with pt prior to sitting. Ambulation:  Contact Guard Assistance, with verbal cues , with increased time for completion  Distance: 90'x1  Surface: Level Tile  Device:Rolling Walker  Gait Deviations: Forward Flexed Posture, Slow Gillian, Decreased Step Length Bilaterally, Decreased Gait Speed, Decreased Heel Strike Bilaterally, Narrow Base of Support and Moderate Path Deviations, Decreased terminal knee extension, Pt veers left due to L sided neglect with cues to focus to prevent bumping into objects. Cues for posture and walking within walker    Balance:  Not tested    Exercise:  Patient was guided in 1 set(s) 10 reps of exercise to both lower extremities.   Ankle pumps, Glut sets, Quad sets, Heelslides,

## 2021-07-08 NOTE — PLAN OF CARE
during hospitalization and Complete education with patient/family with understanding demonstrated regarding disease process and resultant impairment     In order to achieve these goals, nursing interventions may include bowel/bladder training, education for medical assistive devices, medication education, O2 saturation management, energy conservation, stress management techniques, fall prevention, alarms protocol, seating and positioning, skin/wound care, pressure relief instruction, dressing changes, infection protection, DVT prophylaxis, assistance with safe transfers , and/or assistance with bathroom activities and hygiene. PHYSICAL THERAPY:  Goals:        Short term goals  Short term goal 1: Pt will supine<>sit with supervision in order to get out of bed  Short term goal 2: Pt will sit<>stand with supervision   and RW inorder to transfer surfaces  Short term goal 3: Pt will perform stand/pivot transfer with use of RW, CGA, aligning up to next seated surface with min verbal cues. Met/discontinued  Short term goal 4: Pt to walk with  ft, SBA to progress to home mobility  Long term goals  Time Frame for Long term goals : no goals set due to short ELOS    Plan of Care: Patient to be seen by physical therapy services 90 minutes per day Monday through Friday and 30 minutes on Saturday or Sunday    Anticipated interventions may include therapeutic exercises, gait training, neuromuscular re-ed, transfer training, community reintegration, bed mobility, w/c mobility and training. OCCUPATIONAL THERAPY:  Goals:             Short term goals  Time Frame for Short term goals: by discharge  Short term goal 1: Pt will complete visual scanning to L side with min cues during ADLs to decrease fall risk during ADLs. Short term goal 2: Pt will complete LB ADL with Morales and adaptive techs to increase indep with self care.   Short term goal 3: Pt will complete dynamic standing task with B hand release and SBA x 4 min to increase balance required for ADL completion. Short term goal 4: Pt will complete functional mobility to/from BR with AD and SBA to increase indep with self care. Plan of Care: Patient to be seen by occupational therapy services 90 minutes per day Monday through Friday and 30 minutes on Saturday or Sunday    Anticipated interventions may include ADL and IADL retraining, strengthening, safety education and training, patient/caregiver education and training, equipment evaluation/ training/procurement, neuromuscular reeducation, wheelchair mobility training. SPEECH THERAPY:   Short-term Goals  Timeframe for Short-term Goals: 2 weeks  Goal 1: Pt will safely consume soft and bite size diet wtih thin liquids (advanced texture trials as appropriate; direct 1:1 assistance) without overt s/s aspiration or pulmonary compromise to assist with nutrition/hydration measures. Goal 2: Pt will complete functional immediate/delayed recall tasks (inclusion of compensatory strategies) with 60% accuracy, mod cues to improve retention of pertinent information. Goal 3: Pt will complete problem solving, verbal/visual reasoning, and executive functioning tasks (time, basic money/math/medications) with 60% accuracy, mod cues to improve contribution within ADLs/IADLs. Goal 4: Pt will complete sequencing and thought organization tasks with 70% accuracy, mod cues to improve mental flexibility and processing speed. Goal 5: Pt will complete selective attention tasks (focus on the left) with no more than 5 errors until task completion to optimize reading comprehension and visual scanning requried for ADLs. Goal 6: Pt and family will exhibit return demonstration for implementation of low stimulation guidelines/protocol given concerns within acute intracranial findings. Plan of Care: Pt to be seen by speech therapy services 30-60 minutes per day Monday through Friday . Anticipated interventions may include speech/language/communication therapy, cognitive training, group therapy, education, and/or dysphagia therapy based on the above goals. CASE MANAGEMENT:  Goals:   Assist patient/family with discharge planning, patient/family counseling,  and coordination with insurance during the inpatient rehabilitation stay. Other members of the multidisciplinary rehabilitation team that will be involved in the patient's plan of care include recreational therapy, dietary, respiratory therapy, and neuropsychology. Medical issues being managed closely and that require 24 hour availability of a physician:  Swallowing precautions, Bowel/Bladder function, Weight bearing precautions, Wound care, Pain management, Infection protection, DVT prophylaxis, Fall precautions, Fluid/Electrolyte balance, Nutritional status, Respiratory needs and Anemia                                           Physician anticipated functional outcomes: Improved independence with functional measures   Estimated length of stay for this admission 14 days  Medical Prognosis: Good  Anticipated disposition: Home. The potential to achieve the above medical and rehabilitative goals is good. This plan of care has been developed with the assistance and input of the multidisciplinary rehabilitation team.  The plan was reviewed with the patient. The patient has had the opportunity to provide input to the therapy team.    I have reviewed this Individualized Plan of Care and agree with its contents. Above documentation has been expanded, modified, adjusted to reflect the findings of my evaluations and goals for the patient. Physician:   Afshin Briceño M.D.

## 2021-07-08 NOTE — PROGRESS NOTES
[facility] THERAPY MISSED TREATMENT NOTE  [facility] NEUROSCIENCES 4A      Date: 2021  Patient Name: Eamon Hobbs        MRN: [id number]    : 1933  (80 y.o.)    REASON FOR MISSED TREATMENT:    Attempted to see patient at [phone]. Patient currently working with therapy to be transferred to inpatient rehab.         Alma Rosa Lee M.S. [id number] [address]

## 2021-07-08 NOTE — H&P
Physical Medicine & Rehabilitation   History and Physical Exam      Admitting Physician: Dayana Cintron MD    Primary Care Provider: Shola Mcknight MD     Reason for Consult:  Breast cancer with metastatic disease to liver and brain; s/p debulking of right brain mass; Rehab needs    History of Present Illness:  Deborah Virk is a 80 y.o. female admitted to [address] [place] [address] on 7/8/2021. 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 ([address] Carotid stenosis, Colostomy in place [facility]), Degenerative arthritis of cervical spine, GERD (gastroesophageal reflux disease), Hypercholesteremia, Hypoestrogenism, Hypothyroidism, Lumbago, Lumbosacral spinal stenosis, MDRO (multiple drug resistant organisms) resistance, Personal history of cardiac dysrhythmia, Sigmoid diverticulosis, Spondylolisthesis of lumbosacral region, Steroid-induced hyperglycemia, Subclavian artery stenosis ([address] Superior and Inferior Pubic ramus fracture, right, closed, initial encounter ([address] SVT (supraventricular tachycardia) ([address] Temporal arteritis ([address] Vertebral artery occlusion, and Vitamin D deficiency. Patient presented to [facility] ER after suffering a fall at home. Patient was attempting to ambulate when her legs gave out and she hit her nightstand. No LOC. She was too weak to get up. [facility] arrived and found her on the floor and called EMS. Patient underwent CT head and found to have right side brain mass as well as small subarachnoid hemorrhage. She is s/p debulking of the brain mass per Dr. Ursula Barthel 7/2/2021; Biopsy was c/w metastatic disease from her breast cancer. Patient was seen and examined per myself. She is alert and oriented, and very pleasant. She does c/o wax in her ears; ordered Debrox 5 drops bilateral ears bid x 4 days and then flush.   Denied headache today (although she has had them previous post-op days.)  Admitted to improve mobility, ADL's, cognition, and endurance prior to returning home. Current Rehabilitation Assessments:  PT:     Diagnosis: Fall at home  Additional Pertinent Hx: Harshad Lala is an 80year old female presenting to the Emergency Department via EMS for evaluation of potential injuries sustained when she fell in her home this morning around 5 am.  She reports that she was going back to her bed from her bathroom when her legs became weak and she fell. She is not sure if she hit her head on the nightstand but she states that the lamp that was on the nightstand fell on top of her, hitting the right side of her forehead/eye. She denies loss of consciousness. She was found by her home health aide around 0800 as she was not able to get herself up after falling. EMS reports that they have been to Latanya's home multiple times over the last few weeks for lift assist but she refuses to go to the hospital for evaluation. Harshad Lala reports that her legs have been progressively becoming more weak. She denies headache, neck pain, chest pain, abdominal pain. She does admit to lower back pain and lower extremity weakness. No visual changes, lightheadedness or dizziness, nausea or vomiting. She denies any feelings of syncope prior to falling.  Pt is now s/p Craniotomy for resection/debulking of right side intra brain tumor on 7/2/21.      Prior Level of Function:  Lives With: Alone  Type of Home: House  Home Layout: One level  Home Access: Level entry  Home Equipment: [employer], 4 wheeled walker, Cane, Lift chair, Alert Button   Bathroom Shower/Tub: Tub/Shower unit, Shower chair with back  [employer]: Standard (with BSC over toilet)  Bathroom Equipment: Shower chair, 3-in-1 commode     Receives Help From: Personal care attendant  ADL Assistance: Needs assistance  Homemaking Assistance: Needs assistance  Homemaking Responsibilities: Yes  Ambulation Assistance: Independent  Transfer Assistance: Independent  Active : No  Additional Comments: 2 hours/day DIVINA has an aide that assists with ADLs/IADLs. nieces live nearby and able to help at times.     Restrictions/Precautions:  Restrictions/Precautions: General Precautions, Fall Risk  Position Activity Restriction  Other position/activity restrictions: L neglect, R artificial eye      SUBJECTIVE: RN approved session. RN in room updating pt on brain mass results with pt receiving bad news becoming very emotional. Much extra time spent for emotional support. Pt declined exercise after returning to bed to rest and speak with family     PAIN: none indicated     Vitals: Vitals not assessed per clinical judgement, see nursing flowsheet     OBJECTIVE:  Bed Mobility:  Sit to Supine: Contact Guard Assistance      Transfers:  Sit to Stand: Stand By Assistance  Stand to Sit:Stand By Assistance. Cues for alignment and bring walker all the way back with them  Pt slightly impulsive to stand      Ambulation:  Contact Guard Assistance, with verbal cues , with increased time for completion  Distance: 15'x1  Surface: Level Tile  Device:Rolling Walker  Gait Deviations: Forward Flexed Posture, Slow Gillian, Decreased Step Length Bilaterally, Decreased Gait Speed and Decreased Heel Strike Bilaterally  L sided neglect with pt bumping into objects on left side. Cues for posture     Balance:  Static Sitting Balance:  Stand By Assistance  Pt sat EOB with no UE support and cues for posture     Exercise:  Patient declined due to bad news     Functional Outcome Measures: Completed  -PAC Inpatient Mobility Raw Score : 17  -PAC Inpatient T-Scale Score : 42.13     ASSESSMENT:  Assessment: Patient progressing toward established goals. Activity Tolerance:  Patient tolerance of  treatment: good. Pt tolerated session well. Limited by just receiving bad news. Decreased balance, left sided awareness, strength, and endurance.  Pt would benefit from continued PT for improved functional mobility and increased independence    Equipment Recommendations:Equipment Needed: No  Discharge Recommendations:  IP Rehab, Continue to assess pending progress        OT:      Diagnosis: Fall at Home  Additional Pertinent Hx: Sherry Miles is a 80 y.o. female who presents to the emergency department for evaluation of fall. Patient has had multiple falls over the past couple weeks, refused EMS transfer each time. Today patient had a fall, striking her right temple. Patient is on Plavix. 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 Only pain that she is complaining of is to her right temple. Per MRI: Heterogeneous 5.5 cm nodular peripherally enhancing cystic and solid mass centered at the right occipital/temporal lobe junction with prominent adjacent T2/FLAIR prolongation involving the corpus callosum, temporal, parietal and frontal lobes with prominent mass effect on the right lateral ventricle and 9 mm leftward midline shift. (High grade glioma, glioblastoma multiform). Pt is S/P R craniotomy on 7/2/21.     Restrictions/Precautions:  Restrictions/Precautions: General Precautions, Fall Risk  Position Activity Restriction  Other position/activity restrictions: L neglect, R artificial eye      SUBJECTIVE: Pt. Nurse okayed OT treatment. Pt. Sitting up in chair upon entry Pt. Pleasant demo confusion     PAIN: No pain reported     Vitals: Vitals not assessed per clinical judgement, see nursing flowsheet     COGNITION: Decreased Recall, Decreased Insight, Impaired Memory, Decreased Problem Solving and Decreased Safety Awareness     ADL:   Toileting: Contact Guard Assistance, with set-up, with verbal cues  and with increased time for completion. Toilet Transfer: [employer], with set-up and with verbal cues .   .     BALANCE:  Standing Balance: Contact Guard Assistance, with cues for safety.       BED MOBILITY:  Not Tested     TRANSFERS:  Sit to Stand:  Dwight Batista Assistance, with increased time for completion, cues for hand placement, with verbal cues.       FUNCTIONAL MOBILITY:  Assistive Device: Rolling Walker  Assist Level:  Contact Guard Assistance, with set-up and with verbal cues . Distance: To and from bathroom and and in hallway full length x 1 no LOB although decreased safety due to L side neglect        ASSESSMENT:  Activity Tolerance:  Patient tolerance of  treatment: fair. Discharge Recommendations: Continue to assess pending progress, IP Rehab, Patient would benefit from continued therapy after discharge   Equipment Recommendations: Other: continue to assess needs  Plan: Times per week: 6x  Current Treatment Recommendations: Strengthening, Balance Training, Neuromuscular Re-education, Functional Mobility Training, Endurance Training, Home Management Training, Patient/Caregiver Education & Training, Self-Care / ADL, Safety Education & Training       ST:      Diagnosis: Fall at home, initial encounter  Secondary Diagnosis: Dysphagia, cognitive deficits  Precautions: Fall risk, aspiration precautions, low stimulation  Current Diet:  Advanced to regular diet and thin liquids 6/30/21   Swallowing Strategies: Full Upright Position, Small Bite/Sip, Pulmonary Monitoring, Oral Care after all Meals, Intermittnet supervision, Alternate Solids and Liquids, Limit Distractions and Monitor for Fatigue       Date of Last MBS/FEES: Not Applicable     Pain:  No pain reported.     Subjective:  YADY Gomez with approval for completion of cognitive treatment. Patient laying in bed upon ST arrival with brother at bedside. Alert and pleasantly engaged in given tasks.      Short-Term Goals:  SHORT TERM GOAL #1:  Goal 1: Pt will safely consume a regular diet and thin liquids (intermittent supervision) without overt s/s of aspiration or pulmonary compromise to meet nutritional/hydration measures.    INTERVENTIONS: Not addressed due to focus on cognition (diet level upgraded during previous session).      SHORT TERM GOAL #2:  Goal 2: Pt will complete functional immediate/delayed recall tasks (inclusion of compensatory strategies) with 60% accuracy, mod cues to improve retention of pertinent information. INTERVENTIONS:   Orientation   Place - independent   City - independent   Month - independent   Year - Max cues with calendar   [place] - Min cues with calendar  Date - Mod cues with calendar  Time of Day - independent     ST completed review of STM strategies using the acronym WRAP--Write it down, Repeat it, Associate it, and Pictures it. ST then provided patient with 3 new units of information related to [address] [facility], [address]. ST then provided max verbal cues to utilize repetition and association to improve recall.      Immediate Recall: 3/3 independently,   Delayed Recall (5 minutes): 1/3 independently, 2/3 given categorical cue  *good recall of newly learning information following a shift in attention          SHORT TERM GOAL #3:  Goal 3: Pt will complete problem solving, verbal/visual reasoning, and executive functioning tasks (time, basic money/math/medications) with 60% accuracy, mod cues to improve contribution within ADLs/IADLs. INTERVENTIONS:   ID call light - min verbal cues   ID rationale for call light - mod verbal cues   ID 3 situations to utilize call light - 3/3 mod verbal cues      SHORT TERM GOAL #4:  Goal 4: Pt will complete sequencing and thought organization tasks with 70% accuracy, mod cues to improve mental flexibility and processing speed. INTERVENTIONS: Did not address this date d/t focus on other goals      Prior Session:  Verbal sequencing task (washing dishes) - fair success with limited detail and poor verbal sequencing     SHORT TERM GOAL #5:  Goal 5: Pt will complete selective attention tasks (focus on the left) with no more than 5 errors until task completion to optimize reading comprehension and visual scanning requried for ADLs.   INTERVENTIONS: Did not address due to focus on other goals.     SHORT TERM GOAL #6:  Goal 6: Pt and family will exhibit return demonstration for implementation of low stimulation guidelines/protocol given concerns within acute intracranial findings. INTERVENTIONS: Did not address this date d/t focus on other goals. Prior Session  Reviewed low stimulation recommendations. Patient receptive, stating, \"yes I need these lights off to nap too\".  Will require further education.          Past Medical History:        Diagnosis Date    Anxiety     Blind right eye     Breast cancer metastasized to liver ([facility] 05/10/2016    Liver lesion noted     Carotid stenosis      Left ICA 25%    Col[facility] in place [facility]) 05/27/2016    Degenerative arthritis of cervical spine 5/07    GERD (gastroesophageal reflux disease) 11/06    Hypercholesteremia     Hypoestrogenism     Hypothyroidism     Lumbago     Lumbosacral spinal stenosis 05/2019    MDRO (multiple drug resistant organisms) resistance 2008    pt stated cleared    Personal history of cardiac dysrhythmia     Sigmoid diverticulosis 8/04    Spondylolisthesis of lumbosacral region 8/04    Steroid-induced hyperglycemia     Subclavian artery stenosis (HCC)     left    Superior and Inferior Pubic ramus fracture, right, closed, initial encounter ([facility] 05/21/2019    SVT (supraventricular tachycardia) ([facility] 6/07    Temporal arteritis ([facility]     Vertebral artery occlusion     left    Vitamin D deficiency 06/2017       Past Surgical History:        Procedure Laterality Date    CARDIAC CATHETERIZATION  5/07    CATARACT REMOVAL  right 1/08; left 2/08    CHOLECYSTECTOMY  1/03    COLONOSCOPY  11/06    COLOSTOMY  09/04/2018    REVERSAL OF COLOSTOMY    CRANIOTOMY Right 7/2/2021    CRANIOTOMY FOR RESECTION/DEBULKING OF RIGHT SIDE INTRA BRAIN TUMOR [facility] Sw[facility]in [facility] [id number] [address]  [address]      ENDOSCOPY, COLON, DIAGNOSTIC      EYE REMOVAL Right 03/2017    EYE SURGERY      EYE SURGERY Right 11/06/2017    Dr Villanueva Payor in [address]      partial    OTHER SURGICAL HISTORY  05/27/2016    robotic assisted laparoscopic anterior colon resection and end colostomy    DC OFFICE/OUTPT VISIT,PROCEDURE ONLY N/A 9/4/2018    COLOSTOMY REVERSAL AND PARASTOMAL HERNIA REPAIR performed by Willow Grewal MD at [address] PULMON[facility]  2002    UPPER GASTROINTESTINAL ENDOSCOPY  11/06       Allergies:     Allergies   Allergen Reactions    Bactrim [Sulfamethoxazole-Trimethoprim] Swelling     Of tongue    Demerol Rash     nausea    Pcn [Penicillins] Rash        Current Medications:   Current Facility-Administered Medications: acetaminophen (TYLENOL) tablet 650 mg, 650 mg, Oral, Q4H PRN  magnesium hydroxide (MILK OF MAGNESIA) 400 MG/5ML suspension 30 mL, 30 mL, Oral, Daily PRN  0.9 % sodium chloride infusion, 25 mL, Intravenous, PRN  ondansetron (ZOFRAN-ODT) disintegrating tablet 4 mg, 4 mg, Oral, Q8H PRN **OR** ondansetron (ZOFRAN) injection 4 mg, 4 mg, Intravenous, Q6H PRN  sodium chloride flush 0.9 % injection 5-40 mL, 5-40 mL, Intravenous, 2 times per day  sodium chloride flush 0.9 % injection 5-40 mL, 5-40 mL, Intravenous, PRN  fleet rectal enema 1 enema, 1 enema, Rectal, Daily PRN  [START ON 7/9/2021] lidocaine 4 % external patch 2 patch, 2 patch, Topical, Daily  [START ON 7/9/2021] polyethylene glycol (GLYCOLAX) packet 17 g, 17 g, Oral, Daily  ALPRAZolam (XANAX) tablet 0.25 mg, 0.25 mg, Oral, TID PRN  atorvastatin (LIPITOR) tablet 10 mg, 10 mg, Oral, Nightly  dexamethasone (DECADRON) tablet 3 mg, 3 mg, Oral, 3 times per day  dextrose 5 % solution, 100 mL/hr, Intravenous, PRN  dextrose 50 % IV solution, 12.5 g, Intravenous, PRN  docusate sodium (COLACE) capsule 100 mg, 100 mg, Oral, BID  glucagon (rDNA) injection 1 mg, 1 mg, Intramuscular, PRN  glucose (GLUTOSE) 40 % oral gel 15 g, 15 g, Oral, PRN  hydrALAZINE (APRESOLINE) injection 10 mg, 10 mg, Intravenous, Q8H PRN  hydrALAZINE (APRESOLINE) tablet 25 mg, 25 mg, Oral, 3 times per day  insulin lispro (HUMALOG) injection vial 0-6 Units, 0-6 Units, Subcutaneous, TID WC  insulin lispro (HUMALOG) injection vial 0-3 Units, 0-3 Units, Subcutaneous, Nightly  [START ON 7/9/2021] isosorbide mononitrate (IMDUR) extended release tablet 30 mg, 30 mg, Oral, Daily  levETIRAcetam (KEPPRA) tablet 500 mg, 500 mg, Oral, BID  [START ON 7/9/2021] levothyroxine (SYNTHROID) tablet 100 mcg, 100 mcg, Oral, Daily  [START ON 7/9/2021] pantoprazole (PROTONIX) tablet 40 mg, 40 mg, Oral, QAM AC  sertraline (ZOLOFT) tablet 50 mg, 50 mg, Oral, BID  [START ON 7/9/2021] carbamide peroxide (DEBROX) 6.5 % otic solution 5 drop, 5 drop, Both Ears, BID    Social History:  Social History     Socioeconomic History    Marital status:      Spouse name: Not on file    Number of children: 0    Years of education: 10th grade     Highest education level: Not on file   Occupational History    Not on file   Tobacco Use    Smoking status: Former Smoker     Packs/day: 0.50     Types: Cigarettes    Smokeless tobacco: Never Used   Vaping Use    Vaping Use: Every day    Substances: Always   Substance and Sexual Activity    Alcohol use: No    Drug use: No    Sexual activity: Never   Other Topics Concern    Not on file   Social History Narrative    Not on file     Social Determinants of Health     Financial Resource Strain: Low Risk     Difficulty of Paying Living Expenses: Not hard at all   Food Insecurity: No Food Insecurity    Worried About [address] in the Last Year: Never true    [facility] lettrs in the Last Year: Never true   Transportation Needs:     Lack of Transportation (Medical):      Lack of Transportation (Non-Medical):    Physical Activity:     Days of Exercise per Week:     Minutes of Exercise per Session:    Stress:     Feeling of Stress :    Social Connections:     Frequency of Communication with Friends and Family:     Frequency of Social Gatherings with Friends and Family:     Attends Bahai Services:     Active Member of Clubs or Organizations:     Attends Club or Organization Meetings:     Marital Status:    Intimate Partner Violence:     Fear of Current or Ex-Partner:     Emotionally Abused:     Physically Abused:     Sexually Abused:      Lives With: Alone  Type of Home: House  Home Layout: One level  Home Access: Level entry  Home Equipment: [employer], 4 wheeled walker, Cane, Lift chair, Alert Button   Bathroom Shower/Tub: Tub/Shower unit, Shower chair with back  [employer]: Standard (with BSC over toilet)  Bathroom Equipment: Shower chair, 3-in-1 commode     Receives Help From: Personal care attendant  ADL Assistance: Needs assistance  Homemaking Assistance: Needs assistance  Homemaking Responsibilities: Yes  Ambulation Assistance: Independent  Transfer Assistance: Independent     Active : No  Patient's  Info: family  Additional Comments: 2 hours/day JOHN-F has an aide that assists with ADLs/IADLs. nieces live nearby and able to help at times.     Family History:       Problem Relation Age of Onset    Cancer Sister 61        breast    Cancer Brother 79        lung    Cancer Brother 68        colon    Cancer Son 48        lung       Review of Systems:  CONSTITUTIONAL:  positive for  fatigue  EYES: blind right eye, cataracts left  HEENT:  negative  RESPIRATORY:  negative  CARDIOVASCULAR:  negative  GASTROINTESTINAL:  Negative, + BM yesterday  GENITOURINARY:  garcia  SKIN:  Bruising, right facial  HEMATOLOGIC/LYMPHATIC:  positive for swelling/edema  MUSCULOSKELETAL:  positive for  muscle weakness  NEUROLOGICAL:  positive for gait problems and weakness  BEHAVIOR/PSYCH:  negative  System review otherwise negative    Physical Exam:  Vitals:    07/08/21 1455   BP: (!) 136/57   Pulse: 71   Resp: 18   Temp: 98.3 °F (36.8 °C)   SpO2: 96%     awake  Orientation:   person, place, steroid induced  · Hx Breast cancer with mets to the liver and brain  · Cerumen in ears      Plan:   · Admit to the inpatient rehabilitation unit. The patient demonstrates good potential to participate in an inpatient rehabilitation program involving at least 3 hours per day, 5 days per week of intensive rehabilitation. Rehabilitation services will include PT/RT in order to improve functional status prior to discharge. Family education and training will be completed. Equipment evaluations and recommendations will be completed as appropriate. · Rehabilitation nursing will be involved for bowel, bladder, skin, and pain management. Nursing will also provide education and training to patient and family. · Prophylaxis:  DVT: Not on anticoagulant secondary to recent brain surgery. · GI: Protonix  · Pain: Norco, Tylenol  · Nutrition:  Consultation to dietician for nutritional counseling and recommendations. Prealbumin will be checked on admission.     · Bladder: Per nursing  · Bowel: Miralax, Colace, MOM, fleets enema, Senna  · Debrox 5 drops bid x 4 days and then flush  ·  and case management consultations for coordination of care and discharge planning    The main medical problem(s) and comorbidities being actively managed by the physicians and requiring 24 hour rehabilitation nursing care during this stay include:    · Right brain mass; s/p debulking; biopsy c/w metastatic disease from breast  · Fall  · TBI with facial bruising  · Subarachnoid and parenchymal hemorrhage  · Traumatic ecchymosis to left lower leg and left forearm, right forehead ecchymosis  · Bilateral lower extremity weakness  · Hypotension  · Anxiety  · GERD  · SVT  · HLD  · Hyperglycemia, steroid induced  · Hx Breast cancer with mets to the liver and brain      The domains of functional impairment present in this patient which will require an intensive and interdisciplinary rehabilitation environment include self care, mobility, motor dysfunction, bowel/bladder management, pain management and safety. Estimated length of stay for this admission 14 days    Anticipated disposition: Home. The potential to achieve that is good. The post admission physician evaluation (TERESA) is consistent with the pre-admission assessment. See above findings to reflect the elements required in the TERESA. Patient's admitting condition is consistent with the findings of the preadmission assessment by the rehabilitation admissions coordinator.     Saintclair Lank, MD

## 2021-07-09 LAB
ANION GAP SERPL CALCULATED.3IONS-SCNC: 7 MEQ/L (ref 8–16)
BUN BLDV-MCNC: 28 MG/DL (ref 7–22)
CALCIUM SERPL-MCNC: 9.3 MG/DL (ref 8.5–10.5)
CHLORIDE BLD-SCNC: 104 MEQ/L (ref 98–111)
CO2: 30 MEQ/L (ref 23–33)
CREAT SERPL-MCNC: 0.5 MG/DL (ref 0.4–1.2)
ERYTHROCYTE [DISTWIDTH] IN BLOOD BY AUTOMATED COUNT: 14.5 % (ref 11.5–14.5)
ERYTHROCYTE [DISTWIDTH] IN BLOOD BY AUTOMATED COUNT: 51.4 FL (ref 35–45)
GFR SERPL CREATININE-BSD FRML MDRD: > 90 ML/MIN/1.73M2
GLUCOSE BLD-MCNC: 114 MG/DL (ref 70–108)
GLUCOSE BLD-MCNC: 160 MG/DL (ref 70–108)
GLUCOSE BLD-MCNC: 189 MG/DL (ref 70–108)
HCT VFR BLD CALC: 37.3 % (ref 37–47)
HEMOGLOBIN: 11.3 GM/DL (ref 12–16)
MCH RBC QN AUTO: 29.8 PG (ref 26–33)
MCHC RBC AUTO-ENTMCNC: 30.3 GM/DL (ref 32.2–35.5)
MCV RBC AUTO: 98.4 FL (ref 81–99)
PLATELET # BLD: 133 THOU/MM3 (ref 130–400)
PMV BLD AUTO: 12.3 FL (ref 9.4–12.4)
POTASSIUM REFLEX MAGNESIUM: 4.8 MEQ/L (ref 3.5–5.2)
PREALBUMIN: 23.3 MG/DL (ref 20–40)
RBC # BLD: 3.79 MILL/MM3 (ref 4.2–5.4)
SODIUM BLD-SCNC: 141 MEQ/L (ref 135–145)
WBC # BLD: 10.3 THOU/MM3 (ref 4.8–10.8)

## 2021-07-09 PROCEDURE — 97530 THERAPEUTIC ACTIVITIES: CPT

## 2021-07-09 PROCEDURE — 6370000000 HC RX 637 (ALT 250 FOR IP): Performed by: PHYSICAL MEDICINE & REHABILITATION

## 2021-07-09 PROCEDURE — 84134 ASSAY OF PREALBUMIN: CPT

## 2021-07-09 PROCEDURE — 6370000000 HC RX 637 (ALT 250 FOR IP): Performed by: EMERGENCY MEDICINE

## 2021-07-09 PROCEDURE — 97162 PT EVAL MOD COMPLEX 30 MIN: CPT

## 2021-07-09 PROCEDURE — 92523 SPEECH SOUND LANG COMPREHEN: CPT

## 2021-07-09 PROCEDURE — 80048 BASIC METABOLIC PNL TOTAL CA: CPT

## 2021-07-09 PROCEDURE — 97116 GAIT TRAINING THERAPY: CPT

## 2021-07-09 PROCEDURE — 97110 THERAPEUTIC EXERCISES: CPT

## 2021-07-09 PROCEDURE — 82948 REAGENT STRIP/BLOOD GLUCOSE: CPT

## 2021-07-09 PROCEDURE — 92610 EVALUATE SWALLOWING FUNCTION: CPT

## 2021-07-09 PROCEDURE — 97166 OT EVAL MOD COMPLEX 45 MIN: CPT

## 2021-07-09 PROCEDURE — 1180000000 HC REHAB R&B

## 2021-07-09 PROCEDURE — 85027 COMPLETE CBC AUTOMATED: CPT

## 2021-07-09 PROCEDURE — 36415 COLL VENOUS BLD VENIPUNCTURE: CPT

## 2021-07-09 PROCEDURE — 97535 SELF CARE MNGMENT TRAINING: CPT

## 2021-07-09 PROCEDURE — 99233 SBSQ HOSP IP/OBS HIGH 50: CPT | Performed by: NURSE PRACTITIONER

## 2021-07-09 RX ORDER — LANOLIN ALCOHOL/MO/W.PET/CERES
3 CREAM (GRAM) TOPICAL NIGHTLY PRN
Status: DISCONTINUED | OUTPATIENT
Start: 2021-07-09 | End: 2021-07-21 | Stop reason: HOSPADM

## 2021-07-09 RX ADMIN — ISOSORBIDE MONONITRATE 30 MG: 30 TABLET ORAL at 08:11

## 2021-07-09 RX ADMIN — Medication 5 DROP: at 08:10

## 2021-07-09 RX ADMIN — HYDRALAZINE HYDROCHLORIDE 25 MG: 25 TABLET ORAL at 06:00

## 2021-07-09 RX ADMIN — HYDRALAZINE HYDROCHLORIDE 25 MG: 25 TABLET ORAL at 13:39

## 2021-07-09 RX ADMIN — DOCUSATE SODIUM 100 MG: 100 CAPSULE ORAL at 09:37

## 2021-07-09 RX ADMIN — DEXAMETHASONE 3 MG: 1.5 TABLET ORAL at 13:39

## 2021-07-09 RX ADMIN — HYDRALAZINE HYDROCHLORIDE 25 MG: 25 TABLET ORAL at 21:00

## 2021-07-09 RX ADMIN — ACETAMINOPHEN 650 MG: 325 TABLET ORAL at 20:45

## 2021-07-09 RX ADMIN — PANTOPRAZOLE SODIUM 40 MG: 40 TABLET, DELAYED RELEASE ORAL at 06:00

## 2021-07-09 RX ADMIN — ATORVASTATIN CALCIUM 10 MG: 10 TABLET, FILM COATED ORAL at 20:46

## 2021-07-09 RX ADMIN — LEVOTHYROXINE SODIUM 100 MCG: 0.1 TABLET ORAL at 06:00

## 2021-07-09 RX ADMIN — ACETAMINOPHEN 650 MG: 325 TABLET ORAL at 06:00

## 2021-07-09 RX ADMIN — SERTRALINE 50 MG: 50 TABLET, FILM COATED ORAL at 08:11

## 2021-07-09 RX ADMIN — INSULIN LISPRO 2 UNITS: 100 INJECTION, SOLUTION INTRAVENOUS; SUBCUTANEOUS at 12:41

## 2021-07-09 RX ADMIN — ACETAMINOPHEN 650 MG: 325 TABLET ORAL at 11:42

## 2021-07-09 RX ADMIN — ALPRAZOLAM 0.25 MG: 0.25 TABLET ORAL at 20:45

## 2021-07-09 RX ADMIN — DEXAMETHASONE 3 MG: 1.5 TABLET ORAL at 21:00

## 2021-07-09 RX ADMIN — POLYETHYLENE GLYCOL (3350) 17 G: 17 POWDER, FOR SOLUTION ORAL at 08:11

## 2021-07-09 RX ADMIN — LEVETIRACETAM 500 MG: 500 TABLET, FILM COATED ORAL at 08:11

## 2021-07-09 RX ADMIN — DEXAMETHASONE 3 MG: 1.5 TABLET ORAL at 06:00

## 2021-07-09 RX ADMIN — INSULIN LISPRO 1 UNITS: 100 INJECTION, SOLUTION INTRAVENOUS; SUBCUTANEOUS at 17:11

## 2021-07-09 RX ADMIN — LEVETIRACETAM 500 MG: 500 TABLET, FILM COATED ORAL at 20:46

## 2021-07-09 RX ADMIN — SERTRALINE 50 MG: 50 TABLET, FILM COATED ORAL at 20:46

## 2021-07-09 ASSESSMENT — PAIN SCALES - GENERAL
PAINLEVEL_OUTOF10: 2
PAINLEVEL_OUTOF10: 4
PAINLEVEL_OUTOF10: 8
PAINLEVEL_OUTOF10: 8
PAINLEVEL_OUTOF10: 4
PAINLEVEL_OUTOF10: 0

## 2021-07-09 ASSESSMENT — PAIN DESCRIPTION - LOCATION: LOCATION: HEAD

## 2021-07-09 ASSESSMENT — PAIN DESCRIPTION - ORIENTATION: ORIENTATION: UPPER

## 2021-07-09 NOTE — PROGRESS NOTES
[address]  EVALUATION    Time:   Time In: 0900  Time Out: 1030  Timed Code Treatment Minutes: 75 Minutes  Minutes: 90          Date: 2021  Patient Name: Lyndsey Doty,   Gender: female      MRN: [id number]  : 1933  (80 y.o.)  Referring Practitioner: Joel Jansen MD  Diagnosis: breast cancer metastasized to the brain, right  Additional Pertinent Hx: Lyndsey Doty is a 80 y.o. female who presents to the emergency department for evaluation of fall. Patient has had multiple falls over the past couple weeks, refused EMS transfer each time. Today patient had a fall, striking her right temple. Patient is on Plavix. 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 Only pain that she is complaining of is to her right temple. Per MRI: Heterogeneous 5.5 cm nodular peripherally enhancing cystic and solid mass centered at the right occipital/temporal lobe junction with prominent adjacent T2/FLAIR prolongation involving the corpus callosum, temporal, parietal and frontal lobes with prominent mass effect on the right lateral ventricle and 9 mm leftward midline shift. (High grade glioma, glioblastoma multiform). Pt is S/P R craniotomy on 21. Pt admitted to inpatient rehab unit on 2021    Restrictions/Precautions:  Restrictions/Precautions: General Precautions, Fall Risk  Position Activity Restriction  Other position/activity restrictions: L neglect, R artificial eye    Subjective  Chart Reviewed: Yes, Orders, Progress Notes, History and Physical, Imaging, Labs  Patient assessed for rehabilitation services?: Yes    Subjective: Upon entering room pt seated up in recliner chair eating breakfast meal. Pt agreeable to OT session.  Pleasant and cooperative to therapy session  Comments: Pt requires increased time and assistance due to decreased vision    Pain:  Pain Assessment  Patient Currently in Pain: Denies    Vitals: Vitals not assessed per clinical judgement, see nursing flowsheet    Social/Functional History:  Lives With: Alone  Type of Home: House  Home Layout: One level  Home Access: Level entry  Home Equipment: [employer], 4 wheeled walker, Cane, Lift chair, Alert Button   Bathroom Shower/Tub: Tub/Shower unit, Shower chair with back  Bathroom Toilet: Standard  Bathroom Equipment: Shower chair, 3-in-1 commode    Receives Help From: Personal care attendant (Monday-Friday 2 hours)  ADL Assistance: Needs assistance  Homemaking Assistance: Needs assistance  Homemaking Responsibilities: Yes  Ambulation Assistance: Independent  Transfer Assistance: Independent    Active : No  Patient's  Info: family     Additional Comments: 2 hours/day DIVINA has an aide that assists with ADLs/IADLs. nieces live nearby and able to help at times. VISION:Imapired: R artificial, L side neglect    HEARING:  WFL    COGNITION: Slow Processing, Decreased Problem Solving and Decreased Safety Awareness    RANGE OF MOTION:  Bilateral Upper Extremity:  WFL    STRENGTH:  Bilateral Upper Extremity:  Impaired - Groosly impaired    ADL:     EATING:Setup or clean-up assistance. To open containers due to vision   . CARE Score: 5. ORAL HYGIENE:Partial/moderate assistance. Pt requires increased assistance due to decreased vision by completed standing sinks side    . CARE Score: 3.     TOILETING HYGIENE:Partial/moderate assistance. Pt requires increased assistance due to left side neglect  . CARE Score: 3. SHOWERING/BATHING:Partial/moderate assistance. Pt requires increased assistance for safety  . CARE Score: 3.     UPPER BODY DRESSING:Partial/moderate assistance. A for orineting shirt due to vision. CARE Score: 3. LOWER BODY DRESSING:Partial/moderate assistance. .Pt requires increased assistance due to decreased endurance and low vision   CARE Score: 3. Yes    Treatment Initiated: Treatment and education initiated within context of evaluation. Evaluation time included review of current medical information, gathering information related to past medical, social and functional history, completion of standardized testing, formal and informal observation of tasks, assessment of data and development of plan of care and goals. Treatment time included skilled education and facilitation of tasks to increase safety and independence with ADL's for improved functional independence and quality of life. Discharge Recommendations:  Patient would benefit from continued therapy after discharge, Continue to assess pending progress, Home with Home health OT    Patient Education:  OT Education: OT Role, Plan of Care, Precautions, ADL Adaptive Strategies, Transfer Training, Family Education  Patient Education: Pt educated throughout session regarding plan of care and OT role. Pt educated regarding visual scanning in orer to locate obstacles throughout environment due to L side neglect and R side artificial eye  Barriers to Learning: Vision, cognition    Equipment Recommendations: none noted at this time        Plan:  Times per week: 5x/wk for 90 min & 1x/wk for 30 min  Current Treatment Recommendations: Strengthening, Balance Training, Neuromuscular Re-education, Functional Mobility Training, Endurance Training, Home Management Training, Patient/Caregiver Education & Training, Self-Care / ADL, Safety Education & Training  Plan Comment: Pt demonstrates decline from baseline, requiring increased assistance due to decreased endurance and vision. Pt will benefit from skilled OT services to improve functional mobility and self care ADl routine. .  See long-term goal time frame for expected duration of plan of care. If no long-term goals established, a short length of stay is anticipated.     Goals:     Short term goals  Time Frame for Short term goals: 2 weeks  Short term goal 1: Pt will complete visual scanning to L side with min cues during ADLs to decrease fall risk during ADLs. Short term goal 2: Pt will complete LB ADL with Morales and adaptive techs to increase indep with self care. Short term goal 3: Pt will complete dynamic standing task with B hand release and SBA x 4 min to increase balance required for ADL completion. Short term goal 4: Pt will complete functional mobility to/from BR with AD and SBA to increase indep with self care. Long term goals  Time Frame for Long term goals : 4 weeks  Long term goal 1: Pt will complete light ADL MOD I to improve indep in preparing a cup of coffee. Long term goal 2: Pt will complete ADL routine including shower transfer with S and adaptive techs to increase indep with self care. Long term goal 3: Pt will complete dynamic standing task with B hand release and S x 10 min to increase balance required for ADL completion. Long term goal 4: Pt will complete functional mobility to/from BR with AD and MOD I to increase indep with self care. Long term goal 5: Pt will complete toileting routine including transfer with S to increase indep. Following session, patient left in safe position with all fall risk precautions in place.

## 2021-07-09 NOTE — PROGRESS NOTES
Family Medicine Progress Notes/Coverage    Today's Date: 7/9/21  [id number]  Medical Record # [id number]  CSN/Account # [de-identified]      Ms. Rod admitted on 7/8/2021        Subjective / Interval History :     doing well, No SOB, No chest pain , No fatigue. No nausea nor abdominal pain  Was with OT at bathroom  Reviewed notes, consults, laboratory and radiology results,    Objective:       Physical Exam:  Patient Vitals for the past 24 hrs:   BP Temp Temp src Pulse Resp SpO2 Weight   07/09/21 0559 126/60 -- -- 65 -- -- --   07/08/21 2230 129/60 97.4 °F (36.3 °C) Oral 66 17 96 % 133 lb 11.2 oz (60.6 kg)   07/08/21 1455 (!) 136/57 98.3 °F (36.8 °C) Oral 71 18 96 % --       General Appearance:  Alert, cooperative, no distress, appears stated age   HEENT:  Neck: + dressing right temple     Chest/Lungs:  Heart: CTA  RRR   Abdomen:  Back:    Extremities:  Neurological Exam: No edema  WNL       Assessment:     Admitting Diagnosis:    Breast cancer metastasized to brain, right ([address] [C50.911, C79.31]    Active Hospital Problems    Diagnosis Date Noted    Impaired functional mobility, balance, gait, and endurance [Z74.09] 07/08/2021     Priority: High    Breast cancer metastasized to brain, right ([address] [C50.911, C79.31] 07/06/2021     Priority: High    Status post craniotomy [Z98.890] 07/02/2021     Priority: High    Subarachnoid hemorrhage following injury, no loss of consciousness ([facility] [S06.6X0A] 06/28/2021     Priority: High    Fall [W19. XXXA] 06/28/2021     Priority: High    Fall at home, initial encounter [X66. Yamile Mishra, B02.206] 06/28/2021     Priority: Medium    Current chronic use of systemic steroids [Z79.52] 09/04/2018     Priority: Medium    Coronary artery disease involving native coronary artery of native heart without angina pectoris [I25.10] 11/06/2017     Priority: Medium    Anxiety [F41.9] 06/29/2016     Priority: Medium    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]      Priority: Medium    Hypothyroidism due to acquired atrophy of thyroid [E03.4] 03/03/2016     Priority: Medium    Temporal arteritis ([address] [M31.6]      Priority: Medium    GERD (gastroesophageal reflux disease) [K21.9]      Priority: Medium       Plan:     Discussed plans with the nursing staff  Continue rehab    Medications, Laboratories and Imaging results:    Scheduled Meds:   sodium chloride flush  5-40 mL Intravenous 2 times per day    lidocaine  2 patch Topical Daily    polyethylene glycol  17 g Oral Daily    atorvastatin  10 mg Oral Nightly    dexamethasone  3 mg Oral 3 times per day    docusate sodium  100 mg Oral BID    hydrALAZINE  25 mg Oral 3 times per day    insulin lispro  0-6 Units Subcutaneous TID WC    insulin lispro  0-3 Units Subcutaneous Nightly    isosorbide mononitrate  30 mg Oral Daily    levETIRAcetam  500 mg Oral BID    levothyroxine  100 mcg Oral Daily    pantoprazole  40 mg Oral QAM AC    sertraline  50 mg Oral BID    carbamide peroxide  5 drop Both Ears BID     Continuous Infusions:   sodium chloride      dextrose       PRN Meds:acetaminophen, magnesium hydroxide, sodium chloride, ondansetron **OR** ondansetron, sodium chloride flush, fleet, ALPRAZolam, dextrose, dextrose, glucagon (rDNA), glucose, hydrALAZINE    Imaging:    Lab Review :    Lab Results   Component Value Date    WBC 10.3 07/09/2021    HGB 11.3 (L) 07/09/2021    HCT 37.3 07/09/2021    MCV 98.4 07/09/2021     07/09/2021     Lab Results   Component Value Date    CREATININE 0.5 07/09/2021    BUN 28 (H) 07/09/2021     07/09/2021    K 4.8 07/09/2021     07/09/2021    CO2 30 07/09/2021     Lab Results   Component Value Date    CKTOTAL 70 06/28/2021     Lab Results   Component Value Date    ALT 81 (H) 07/06/2021    AST 78 (H) 07/06/2021    ALKPHOS 53 07/06/2021    BILITOT 0.5 07/06/2021     Lab Results Component Value Date    LIPASE 12.6 08/12/2020         Electronically signed by Emily Ghotra MD on 7/9/21 at 9:17 AM AMELIA Francisco M.D.

## 2021-07-09 NOTE — PROGRESS NOTES
cancer. Transfer to [facility] for ongoing rehabilitation. ST consult for completion of cognitive assessment for further establishment of Goals/POC. Past Medical History:   Diagnosis Date    Anxiety     Blind right eye     Breast cancer metastasized to liver ([facility] 05/10/2016    Liver lesion noted     Carotid stenosis      Left ICA 25%    Colostomy in place [facility]) 05/27/2016    Degenerative arthritis of cervical spine 5/07    GERD (gastroesophageal reflux disease) 11/06    Hypercholesteremia     Hypoestrogenism     Hypothyroidism     Lumbago     Lumbosacral spinal stenosis 05/2019    MDRO (multiple drug resistant organisms) resistance 2008    pt stated cleared    Personal history of cardiac dysrhythmia     Sigmoid diverticulosis 8/04    Spondylolisthesis of lumbosacral region 8/04    Steroid-induced hyperglycemia     Subclavian artery stenosis (HCC)     left    Superior and Inferior Pubic ramus fracture, right, closed, initial encounter ([facility] 05/21/2019    SVT (supraventricular tachycardia) ([facility] 6/07    Temporal arteritis (HCC)     Vertebral artery occlusion     left    Vitamin D deficiency 06/2017       Pain: reports of pain in legs. States call out to niece to obtain leg cream ([employer]raworx) for assistance with pain management. Subjective:  Pt seen upright in chair. Pleasant and cooperative. No family present. SOCIAL HISTORY:   Living Arrangements: Lives at home alone with check ups from family (nieces)   Work History: Retired  Education Level: 8th grade education level   Driving Status: Does not drive  Finance Management: Independent  Medication Management: Assistance Required  ADL's: Assistance Required.  Aide comes in M-F, assistance with medications, bathing, toileting (as available), cooking and cleaning   Hobbies: Enjoys writing out grocery lists although admits to difficulty given arthritis in hands and R eye blindness   Vision Status: Blind in R eye   Hearing: decreased hearing acuity, no assistive devices   Type of Home: House  Home Layout: One level  Home Access: Level entry  Home Equipment: [employer], 4 wheeled walker, Cane, Lift chair, Alert Rm & Amado / VOICE:  Speech and Voice appear to be grossly intact for basic and complex daily communication    LANGUAGE:  Receptive:  2 Step Commands: 2/4 indep, 2/4 supervision  Complex Yes/No Questions: 0/3    Expressive:  Confrontational Naming: 3/3 Objects (unable to complete pictures on MOCA given R eye blindness)   Responsive Naming: 3/3  Divergent Naming (abstract): x1 indep 1 minute (N=11 wpm)   Sentence Formulation: WFL  Conversational Speech: WFL  Repetition: sentence level 1/2    COGNITION:  Informal cognitive screener (B) administered (blind version) with score of  calculated with indications for a mild-moderate impairment level per skilled clinical findings. Orientation: 6  Immediate Recall: 35  Short-Term Recall: 3/5 indep, 2/5 FO2  Divergent Naming: x1 indep 1 minute (N=11 wpm)   Problem Solvin/1 for call light safety  Reasonin/2  Thought Organization: moderate impairment  Insight: fair  Attention: poor   Math Computation: 0/1  Executive Functioning: unable to complete given decreased visual acuity    SWALLOWING:  Current Diet: Regular diet and thin liquids   CSE completed in prior session. See note for details         RECOMMENDATIONS/ASSESSMENT:  DIAGNOSTIC IMPRESSIONS:  Pt presents with a mild-moderate cognitive impairment evidenced by deficits within the domains of functional carryover (I.e. immediate/delayed recall), impaired verbal reasoning, thought organization, mental flexibility, poor attention and decreased mathematical computation. Improved score of informal testing screener by x2 points from acute care setting prior to craniotomy. Pt with high level of support within home setting (I.e. aide present 5 days/week, support from family) and low cognitive demands.  Mild-moderate receptive language deficits evidenced by difficulty following multi step commands and answering complex yes/no questions. Expressive language skills relatively intact. No overt dysarthria. Consuming a regular diet and thin liquids without overt difficulty. Would highly benefit from ongoing ST intervention to promote cognitive-linguistic skills to a supervision level of assist for safe, functional return to home setting post discharge. Rehabilitation Potential: excellent    Comprehension: 3 - Patient understands basic needs 50-74% of the time  Expression: 4 - Expresses basic ideas/needs 75-90%+ of the time  Social Interaction: 4 - Patient appropriate 75-90%+ of the time  Problem Solvin - Patient solves simple/routine tasks 25%-49%  Memory: 3 - Patient remembers 50%-74% of the time      EDUCATION:  Learner: Patient  Education:  Reviewed results and recommendations of this evaluation and Reviewed ST goals and Plan of Care  Evaluation of Education: Verbalizes understanding, Demonstrates with assistance, Needs further instruction and Family not present    PLAN:  Skilled SLP intervention on IP Rehab or TCU 30 minutes per day 5 days per week. Specific interventions for next session may include: memory, dietary monitor, problem solving     PATIENT GOAL:    Did not state. Will further assess during treatment. SHORT TERM GOALS:  Short-term Goals  Timeframe for Short-term Goals: 1 week  Goal 1: Pt will consume a regular diet and thin liquids without overt s/s of aspiration to meet nutritional/hydration measures safely. Goal 2: Pt will complete functional immediate/delayed/prospective recall tasks (inclusion of compensatory strategies) with 70% accuracy, mod cues to improve retention of pertinent information.   Goal 3: Pt will complete problem solving, verbal/visual reasoning, and executive functioning tasks (safety time, basic money/math comprehensive of preset up pill box) with 60% accuracy, mod cues to improve contribution within ADLs/IADLs. Goal 4: Pt will complete sequencing and thought organization tasks (i.e. divergent naming, multi step commands, complex yes/no questions) with 70% accuracy, mod cues to improve mental flexibility and processing speed. Goal 5: Pt will complete functional attention tasks (i.e. sustained, selective, alternating) with no more than x3-4 errors/redirections within task completion for successful management of ADL's post discharge. LONG TERM GOALS:  Long-term Goals  Timeframe for Long-term Goals: 2 weeks  Goal 1: Pt will improve cognitive-linguistic skills to a supervision level of assist for safe, functional return to home setting with family support post discharge.       Rabia Lennon M.S. Wood Concepcion 7/9/2021

## 2021-07-09 NOTE — PROGRESS NOTES
Physical Medicine & Rehabilitation   Progress Note    Chief Complaint:  Brain mass. Rehab needs    Subjective:  Patient seen up in chair, working with SLP. Patient with difficulty getting to sleep last night. States her legs were bothering her. States she normally uses a Therex spray at home that is very helpful. She is going to have her nieces bring this in. Discussed Melatonin PRN as well. Patient agreeable. Patient denies any other needs or concerns at this time. +BM 7/8/21    Rehabilitation:  PT: await evals on IPR  OT: await evals on IPR  ST:   Pharyngeal Phase: WFL:  Pharyngeal phase appears WFL but cannot rule out pharyngeal phase deficits from a bedside swallowing evaluation alone. Signs and Symptoms of Laryngeal Penetration/Aspiration: No signs/symptoms of aspiration evident in this evaluation, but cannot rule out silent aspiration. Impresssions: Pt presents with oropharyngeal swallow function that appears to be [facility] evidenced by the above skilled level observations. Pt with adequate bolus breakdown, formation and AP transit. Effective oral clearance with extra time and utilization of liquid wash. Consumption of above PO trials without overt difficulty and/or overt s/s of aspiration. Pt with what appeared to be a timely pharyngeal swallow trigger and likely functional oral bolus control/containment. Unable to fully r/o pharyngeal dysfunction and/or silent airway invasion events in it's entirety without completion of formal instrumentation. Pt's swallow physiology does appear appropriate to support PO consumption without distress. Recommendations for resumption of regular diet and thin liquids. Recommend skilled dietary monitor x1 session. ST to follow up with cognitive assessment in subsequent treatment session. RECOMMENDATIONS/ASSESSMENT:  Instrumental Evaluation: Instrumental evaluation not indicated at this time.   Diet Recommendations:  Regular diet and thin liquids        Review of Systems:  CONSTITUTIONAL:  positive for  fatigue  EYES: blind right eye, cataracts left  HEENT:  negative  RESPIRATORY:  negative  CARDIOVASCULAR:  negative  GASTROINTESTINAL:  Negative, + BM yesterday  GENITOURINARY:  garcia  SKIN:  Bruising, right facial  HEMATOLOGIC/LYMPHATIC:  positive for swelling/edema  MUSCULOSKELETAL:  positive for  muscle weakness  NEUROLOGICAL:  positive for gait problems and weakness  BEHAVIOR/PSYCH:  negative  System review otherwise negative      Objective:  /63   Pulse 63   Temp 97.2 °F (36.2 °C) (Oral)   Resp 18   Wt 133 lb 11.2 oz (60.6 kg)   LMP  (LMP Unknown)   SpO2 94%   BMI 26.11 kg/m²   awake  Orientation:   person, place, time  Mood: within normal limits  Affect: calm  General appearance: Patient is well nourished, well developed, well groomed and in no acute distress     Memory:   normal,   Attention/Concentration: normal  Language:  normal     Cranial Nerves:  cranial nerves II-XII are grossly intact except chronic vision issues with right eye blindness and left eye cataracts  ROM:  normal  Motor Exam:  Motor exam is symmetrical 4+ out of 5 all extremities bilaterally  Tone:  normal  Muscle bulk: within normal limits  Sensory:  Sensory intact     Skin: warm and dry, no rash or erythema  Peripheral vascular: Pulses: Normal upper and lower extremity pulses; Edema: trace    Diagnostics:   Recent Results (from the past 24 hour(s))   POCT glucose    Collection Time: 07/08/21  5:00 PM   Result Value Ref Range    POC Glucose 99 70 - 108 mg/dl   POCT glucose    Collection Time: 07/08/21  9:03 PM   Result Value Ref Range    POC Glucose 133 (H) 70 - 108 mg/dl   Basic Metabolic Panel w/ Reflex to MG    Collection Time: 07/09/21  7:05 AM   Result Value Ref Range    Sodium 141 135 - 145 meq/L    Potassium reflex Magnesium 4.8 3.5 - 5.2 meq/L    Chloride 104 98 - 111 meq/L    CO2 30 23 - 33 meq/L    Glucose 114 (H) 70 - 108 mg/dL    BUN 28 (H) 7 - 22 mg/dL    CREATININE 0.5 0.4 - 1.2 mg/dL    Calcium 9.3 8.5 - 10.5 mg/dL   Prealbumin    Collection Time: 07/09/21  7:05 AM   Result Value Ref Range    Prealbumin 23.3 20.0 - 40.0 mg/dl   CBC    Collection Time: 07/09/21  7:05 AM   Result Value Ref Range    WBC 10.3 4.8 - 10.8 thou/mm3    RBC 3.79 (L) 4.20 - 5.40 mill/mm3    Hemoglobin 11.3 (L) 12.0 - 16.0 gm/dl    Hematocrit 37.3 37.0 - 47.0 %    MCV 98.4 81.0 - 99.0 fL    MCH 29.8 26.0 - 33.0 pg    MCHC 30.3 (L) 32.2 - 35.5 gm/dl    RDW-CV 14.5 11.5 - 14.5 %    RDW-SD 51.4 (H) 35.0 - 45.0 fL    Platelets 978 616 - 278 thou/mm3    MPV 12.3 9.4 - 12.4 fL   Anion Gap    Collection Time: 07/09/21  7:05 AM   Result Value Ref Range    Anion Gap 7.0 (L) 8.0 - 16.0 meq/L   Glomerular Filtration Rate, Estimated    Collection Time: 07/09/21  7:05 AM   Result Value Ref Range    Est, Glom Filt Rate >90 ml/min/1.73m2       Impression:  · Right brain mass - metastatic from breast  · Fall  · CHI with facial bruising  · Subarachnoid and parenchymal hemorrhage  · Traumatic ecchymosis to left lower leg and left forearm, right forehead ecchymosis  · Bilateral lower extremity weakness  · Hypotension  · Anxiety  · GERD  · SVT  · HLD  · Hyperglycemia, steroid induced  · Hx Breast cancer with mets to the liver    Plan:  Continue current therapies  Prophylaxis: DVT - SCDs/BARRY due to brain bleed, GI - Protonix  Pain: tylenol, lidoderm,   Bowels: Colace, fleets, MOM, miralax  Seizure: keppra  Anxiety: xanax, zoloft  Melatonin at night PRN  Ok for OTC spray for leg pain at night  No INT, d/c'd IV medications.    · Conference tuesdays       Missed Therapy Time:  · None    Duane uAgustine, APRN - CNP

## 2021-07-09 NOTE — PROGRESS NOTES
[address] [place] [address]  INPATIENT PHYSICAL THERAPY  EVALUATION  254 [facility] - [id number]    Time In: 2356    Time Out: 1237  Timed Code Treatment Minutes: 62 Minutes  Minutes: 45          Date: 2021  Patient Name: Elridge Mcardle,  Gender:  female        MRN: [id number]  : 1933  (80 y.o.)  Referral Date : 21   Referring Practitioner: Marly Chin MD  Diagnosis: Breast Cancer Metastisized to brain, right  Additional Pertinent Hx: Per H&P:Latanya Rod is a 80 y.o. female admitted to [address] [place] [address] on 2021. 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 ([address] Carotid stenosis, Colostomy in place [facility]), Degenerative arthritis of cervical spine, GERD (gastroesophageal reflux disease), Hypercholesteremia, Hypoestrogenism, Hypothyroidism, Lumbago, Lumbosacral spinal stenosis, MDRO (multiple drug resistant organisms) resistance, Personal history of cardiac dysrhythmia, Sigmoid diverticulosis, Spondylolisthesis of lumbosacral region, Steroid-induced hyperglycemia, Subclavian artery stenosis ([address] Superior and Inferior Pubic ramus fracture, right, closed, initial encounter ([address] SVT (supraventricular tachycardia) ([address] Temporal arteritis ([address] Vertebral artery occlusion, and Vitamin D deficiency. Patient presented to [facility] ER after suffering a fall at home. Patient was attempting to ambulate when her legs gave out and she hit her nightstand. No LOC. She was too weak to get up. Her [employer] [facility] aide arrived and found her on the floor and called EMS. Patient underwent CT head and found to have right side brain mass as well as small subarachnoid hemorrhage. She is s/p debulking of the brain mass per Dr. Jonny Mo 2021; Biopsy was c/w metastatic disease from her breast cancer.      Restrictions/Precautions:  Restrictions/Precautions: General Precautions, Fall Risk  Position Activity Restriction  Other position/activity restrictions: L neglect, R artificial eye    Subjective:  Chart Reviewed: Yes  Patient assessed for rehabilitation services?: Yes  Family / Caregiver Present: No  Subjective: Patient in room in chair, agreeable to PT, just finishing with speech therapy. Pt cooperative and pleasant. General:  Overall Orientation Status: Within Functional Limits    Vision: Impaired  Vision Exceptions:  (right eye artificial, neglect left eye)    Hearing: Within functional limits         Pain: 8/10 head, nurse notified and provided medication    Vitals: Vitals not assessed per clinical judgement, see nursing flowsheet    Social/Functional History:    Lives With: Alone  Type of Home: House  Home Layout: One level  Home Access: Level entry  Home Equipment: 4 wheeled walker, Cane, Lift chair, Alert Button, hospital bed (pt reports wheelchair was husbands who passed away in 2003, unsure if it would fit in home)     Bathroom Shower/Tub: Tub/Shower unit, Shower chair with back  [employer]: Standard  Bathroom Equipment: Shower chair, 3-in-1 commode    Receives Help From: Personal care attendant (Monday-Friday 2 hours)  ADL Assistance: Needs assistance  Homemaking Assistance: Needs assistance  Homemaking Responsibilities: Yes  Ambulation Assistance: Independent  Transfer Assistance: Independent    Active : No     Additional Comments: 2 hours/day M-F has an aide that assists with ADLs/IADLs. nieces live nearby and able to help at times.     OBJECTIVE:  Range of Motion:  Right Lower Extremity: WFL  Left Lower Extremity: [facility]    Strength:  Right Lower Extremity: Impaired - hip flexion 4-/5, knee 4/5, ankle 4/5  Left Lower Extremity: Impaired - hip flxion 4-/5, knee 4/5, ankle 4/5    Balance:  Static Standing Balance: Contact Guard Assistance  Dynamic Standing Balance: Contact Guard Assistance    Bed Mobility:  Rolling to Left: Modified Independent   Rolling to Right: Modified Independent   Supine to Sit: Stand By Assistance  Sit to Supine: Stand By Assistance    With bed rail. Pt has hospital bed at home, increased time to complete supine < > sit    Transfers:  Sit to Stand: Contact Guard Assistance--verbal cues for hand placement after initial assessment  Stand to Sit:Contact Guard Assistance--verbal cues for hand placement after initial assessment  Stand Pivot:Contact Guard Assistance  Car:Minimal Assistance-verbal cues for positioning and technique, increased difficulty due to limited vision. Pt attempting to put foot into car before sitting in car. *Pt not able to complete car transfer without PT intervention safely. Ambulation:  Contact Guard Assistance  Distance: 11', 35', 30', 5'x2  Surface: Level Tile  Device:Rolling Walker  Gait Deviations: Forward Flexed Posture, Slow Gillian, Decreased Step Length Bilaterally, Decreased Gait Speed and Decreased Heel Strike Bilaterally  *Verbal cues following initial assessment for body positioning with walker and avoiding ambulating too close inside of walker    *Pt not safe to attempt step due to weakness and vision impairment  *Pt does not have reacher at home, reports she usually squats to  object. Wheelchair Mobility:  Stand By Assistance  Extremities Used: Bilateral Upper Extremities  Type of Chair:Manual  Surface: Level Tile  Distance: 54', 75'  Quality: Slow Velocity, Short Strokes, Veers Right and Decreased Fluidity   Limited by shoulder weakness and fatigue, consistently veered to right but able to correct with increased time    Exercise:  Patient was guided in 1 set(s) 10 reps of exercise to both lower extremities. Standing: march, hip flexion, heel raise. Seated: long arc quad, march, ankle pumps. Exercises were completed for increased independence with functional mobility.     Functional Outcome Measures: Completed  Balance Score: 8  Gait Score: 5  Tinetti Total Score: 13/28  Risk Indicators:  Less than/equal to 18 = high risk  19-23 Moderate risk  Greater than/equal to 24 = low risk    ASSESSMENT:  Activity Tolerance:  Patient tolerance of  treatment: good. Treatment Initiated: Treatment and education initiated within context of evaluation. Evaluation time included review of current medical information, gathering information related to past medical, social and functional history, completion of standardized testing, formal and informal observation of tasks, assessment of data and development of plan of care and goals. Treatment time included skilled education and facilitation of tasks to increase safety and independence with functional mobility for improved independence and quality of life. Gait training completed to improve patient's ability to navigate her living environment. Therapeutic exercise completed to improve patient's LE strength and decrease difficulty with functional mobility. Therapeutic activity completed to improve patient's ability to complete functional transfers. Assessment: Body structures, Functions, Activity limitations: Decreased functional mobility , Decreased endurance, Decreased strength, Decreased balance, Decreased vision/visual deficit  Assessment: Patient is an 80year old female who presents with a decline in functional mobility compared to baseline. Patient completing transfers and ambulation with CGA/min assist, requiring assistance and cues due to weakness and impaired vision. Pt would benefit from skilled PT services to improve her ability to complete functional mobility, reduce her risk for falls and allow patient to return to OF.   Prognosis: Good  Co-morbidities: anxiety, spinal stenosis, blind right eye  Pt with decreased vision left eye, head pain       Discharge Recommendations:  Discharge Recommendations: Continue to assess pending progress    Patient Education:  PT Education: Goals, PT Role, Plan of Care, Transfer Training, Gait Training    Equipment Recommendations:  Equipment Needed: Yes (RW, wheelchair)    Plan:  Times per week: 5x/wk 90min, 1x/wk 30min  Times per day: Daily  Current Treatment Recommendations: Strengthening, Transfer Training, Endurance Training, Neuromuscular Re-education, Patient/Caregiver Education & Training, Balance Training, Gait Training, Wheelchair Mobility Training, Home Exercise Program, Functional Mobility Training, Stair training, Safety Education & Training    Goals:  Patient goals : go home  Short term goals  Time Frame for Short term goals: 1 week  Short term goal 1: Patient will complete supine < > sit with modified independence to transfer in/out of bed safely. Short term goal 2: Patient will complete sit < > stand with SBA and verbal cues to less than or equal to 50% of the time to stand to ambulate with decreased difficulty. Short term goal 3: Patient will ambulate 76' with a RW and CGA to progress towards ambulating household distances safely. Short term goal 4: Patient will propel wheelchair 100' with SBA to increase independence and mobility. Long term goals  Time Frame for Long term goals : 4 weeks  Long term goal 1: Patient will complete sit < > stand and stand pivot transfers with modified independence to transfer surface to surface safely. Long term goal 2: Patient will ambulate 80' with a RW and supervision to navigate home with decreased difficulty. Long term goal 3: Patient will improve tinetti to greater than or equal to 19/28 to reduce her risk for falls. Long term goal 4: Patient will ascend/descend 1 step with a RW and CGA for community entry. Following session, patient left in safe position with all fall risk precautions in place.

## 2021-07-09 NOTE — PROGRESS NOTES
[address] [place] [address]  INPATIENT PHYSICAL THERAPY  DAILY NOTE  254 [facility] - [id number]    Time In: 1400  Time Out: 1430  Timed Code Treatment Minutes: 30 Minutes  Minutes: 30          Date: 2021  Patient Name: Marisol Matthews,  Gender:  female        MRN: [id number]  : 1933  (80 y.o.)  Referral Date : 21  Referring Practitioner: Gerri Be MD  Diagnosis: Breast Cancer Metastisized to brain, right  Additional Pertinent Hx: Per H&P:Latanya Rod is a 80 y.o. female admitted to [address] [place] [address] on 2021. 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 ([address] Carotid stenosis, Colostomy in place [facility]), Degenerative arthritis of cervical spine, GERD (gastroesophageal reflux disease), Hypercholesteremia, Hypoestrogenism, Hypothyroidism, Lumbago, Lumbosacral spinal stenosis, MDRO (multiple drug resistant organisms) resistance, Personal history of cardiac dysrhythmia, Sigmoid diverticulosis, Spondylolisthesis of lumbosacral region, Steroid-induced hyperglycemia, Subclavian artery stenosis ([address] Superior and Inferior Pubic ramus fracture, right, closed, initial encounter ([address] SVT (supraventricular tachycardia) ([address] Temporal arteritis ([address] Vertebral artery occlusion, and Vitamin D deficiency. Patient presented to [facility] ER after suffering a fall at home. Patient was attempting to ambulate when her legs gave out and she hit her nightstand. No LOC. She was too weak to get up. Her [facility] [place] aide arrived and found her on the floor and called EMS. Patient underwent CT head and found to have right side brain mass as well as small subarachnoid hemorrhage. She is s/p debulking of the brain mass per Dr. Vasquez Akins 2021; Biopsy was c/w metastatic disease from her breast cancer.      Prior Level of Function:  Lives With: Alone  Type of Home: House  Home Layout: One level  Home Access: Level entry  [address]: 4 wheeled walker, [employer], [address] chair, Alert [employer] (pt reports wheelchair was husbands who passed away in 2003, unsure if it would fit in home)   Bathroom Shower/Tub: Tub/Shower unit, Shower chair with back  [employer]: Standard  Bathroom Equipment: Shower chair, 3-in-1 commode    Receives Help From: Personal care attendant (Monday-Friday 2 hours)  ADL Assistance: Needs assistance  Homemaking Assistance: Needs assistance  Homemaking Responsibilities: Yes  Ambulation Assistance: Independent  Transfer Assistance: Independent  Active : No  Additional Comments: 2 hours/day M-F has an aide that assists with ADLs/IADLs. nieces live nearby and able to help at times. Restrictions/Precautions:  Restrictions/Precautions: General Precautions, Fall Risk  Position Activity Restriction  Other position/activity restrictions: L neglect, R artificial eye     SUBJECTIVE: Patient sitting in bedside chair upon arrival and agreeable to therapy. Patient's brother present to observe session. Patient requested to use restroom during session. PAIN: denies    Vitals: Vitals not assessed per clinical judgement, see nursing flowsheet    OBJECTIVE:  Bed Mobility:  Not Tested    Transfers:  Sit to Stand: Stand By Assistance, [employer], cues for hand placement, SBA from bedside chair, CGA from low toilet seat  Stand to Sit:Stand By Assistance, cues for hand placement    Ambulation:  Contact Guard Assistance, Minimal Assistance, X 1  Distance: ~30ft, 25ft, 8ft  Surface: Level Tile  Device:Rolling Walker  Gait Deviations:   Forward Flexed Posture, Slow Gillian, Decreased Step Length Bilaterally, Decreased Gait Speed, Decreased Heel Strike Bilaterally, Moderate Path Deviations and left side neglect, cues and assist for walker navigation    **cues for path finding, difficulty due to poor eye sight and left side neglect    Exercise:  Patient was guided in 1 set(s) 10 reps of exercise to both lower extremities. Ankle pumps, Glut sets, Quad sets, Heelslides, Hip abduction/adduction, Straight leg raises, Seated marches and Long arc quads. Exercises were completed for increased independence with functional mobility. Functional Outcome Measures: Not completed       ASSESSMENT:  Assessment: Patient progressing toward established goals. Activity Tolerance:  Patient tolerance of  treatment: fair. Equipment Recommendations:Equipment Needed: Yes (RW, wheelchair)  Discharge Recommendations:  Continue to assess pending progress    Plan: Times per week: 5x/wk 90min, 1x/wk 30min  Times per day: Daily  Current Treatment Recommendations: Strengthening, Transfer Training, Endurance Training, Neuromuscular Re-education, Patient/Caregiver Education & Training, Balance Training, Gait Training, Wheelchair Mobility Training, Home Exercise Program, Functional Mobility Training, Stair training, Safety Education & Training    Patient Education  Patient Education: Plan of Care, Transfers, Gait, Up in Chair for Kim Salas, Verbal Exercise Instruction    Goals:  Patient goals : go home  Short term goals  Time Frame for Short term goals: 1 week  Short term goal 1: Patient will complete supine < > sit with modified independence to transfer in/out of bed safely. Short term goal 2: Patient will complete sit < > stand with SBA and verbal cues to less than or equal to 50% of the time to stand to ambulate with decreased difficulty. Short term goal 3: Patient will ambulate 76' with a RW and CGA to progress towards ambulating household distances safely. Short term goal 4: Patient will propel wheelchair 100' with SBA to increase independence and mobility. Long term goals  Time Frame for Long term goals : 4 weeks  Long term goal 1: Patient will complete sit < > stand and stand pivot transfers with modified independence to transfer surface to surface safely.   Long term goal 2: Patient will ambulate 80' with a RW and supervision to navigate home with decreased difficulty. Long term goal 3: Patient will improve tinetti to greater than or equal to 19/28 to reduce her risk for falls. Long term goal 4: Patient will ascend/descend 1 step with a RW and CGA for community entry. Following session, patient left in safe position with all fall risk precautions in place.

## 2021-07-09 NOTE — PROGRESS NOTES
[facility]  Clinical Swallow Evaluation      SLP Individual Minutes  Time In: 0825  Time Out: 0458  Minutes: 12  Timed Code Treatment Minutes: 0 Minutes       Date: 2021  Patient Name: Krishna Baker      CSN: [id number]   : 1933  (80 y.o.)  Gender: female   Referring Physician: Su Benito MD   Diagnosis: s/p craniotomy   Secondary Diagnosis: Dysphagia; cognitive deficits     History of Present Illness/Injury: Pt admit with above medical dx. Per physician H&P, Krishna Baker is a 80 y.o. female admitted to [address] [place] [address] on 2021. 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 ([address] Carotid stenosis, Colostomy in place [facility]), Degenerative arthritis of cervical spine, GERD (gastroesophageal reflux disease), Hypercholesteremia, Hypoestrogenism, Hypothyroidism, Lumbago, Lumbosacral spinal stenosis, MDRO (multiple drug resistant organisms) resistance, Personal history of cardiac dysrhythmia, Sigmoid diverticulosis, Spondylolisthesis of lumbosacral region, Steroid-induced hyperglycemia, Subclavian artery stenosis ([address] Superior and Inferior Pubic ramus fracture, right, closed, initial encounter ([address] SVT (supraventricular tachycardia) ([address] Temporal arteritis ([address] Vertebral artery occlusion, and Vitamin D deficiency. Patient presented to [facility] ER after suffering a fall at home. Patient was attempting to ambulate when her legs gave out and she hit her nightstand. No LOC. She was too weak to get up. [facility] and found her on the floor and called EMS. Patient underwent CT head and found to have right side brain mass as well as small subarachnoid hemorrhage. She is s/p debulking of the brain mass and R craniotomy per Dr. Azar Fitzpatrick 2021; Biopsy was c/w metastatic disease from her breast cancer.  Transfer to [facility] for ongoing WFL but cannot rule out pharyngeal phase deficits from a bedside swallowing evaluation alone. Signs and Symptoms of Laryngeal Penetration/Aspiration: No signs/symptoms of aspiration evident in this evaluation, but cannot rule out silent aspiration. Impresssions: Pt presents with oropharyngeal swallow function that appears to be [facility] evidenced by the above skilled level observations. Pt with adequate bolus breakdown, formation and AP transit. Effective oral clearance with extra time and utilization of liquid wash. Consumption of above PO trials without overt difficulty and/or overt s/s of aspiration. Pt with what appeared to be a timely pharyngeal swallow trigger and likely functional oral bolus control/containment. Unable to fully r/o pharyngeal dysfunction and/or silent airway invasion events in it's entirety without completion of formal instrumentation. Pt's swallow physiology does appear appropriate to support PO consumption without distress. Recommendations for resumption of regular diet and thin liquids. Recommend skilled dietary monitor x1 session. ST to follow up with cognitive assessment in subsequent treatment session. RECOMMENDATIONS/ASSESSMENT:  Instrumental Evaluation: Instrumental evaluation not indicated at this time. Diet Recommendations:  Regular diet and thin liquids   Strategies:  Full Upright Position, Small Bite/Sip and Alternate Solids and Liquids   Rehabilitation Potential: excellent    EDUCATION:  Learner: Patient  Education:  Reviewed results and recommendations of this evaluation, Reviewed diet and strategies and Reviewed ST goals and Plan of Care  Evaluation of Education: Verbalizes understanding and Family not present    PLAN:  Speech Therapy evaluation to assess speech, language, cognition and/or voice    PATIENT GOAL:    Did not state. Will further assess during treatment.     SHORT TERM GOALS:  Short-term Goals  Timeframe for Short-term Goals: 1 week  Goal 1: Pt will consume a regular diet and thin liquids without overt s/s of aspiration to meet nutritional/hydration measures safely. Goal 2: Complete cognitive assessment for development of further Goals/POC.      LONG TERM GOALS:  To be established in subsequent treatment session following completion of cognitive evaluation     TENNILLE Reyes 7/9/2021

## 2021-07-09 NOTE — PROGRESS NOTES
[facility]  Recreational Therapy  Inpatient Rehabilitation Evaluation        Time Spent with Patient: 23 minutes    Date:  7/9/2021       Patient Name: Barrett Tong      MRN: [id number]       YOB: 1933 (80 y.o.)       Gender: female  Diagnosis: breast cancer metastasized to the brain, right  Referring Practitioner:  Denise Watkins MD    RESTRICTIONS/PRECAUTIONS:  Restrictions/Precautions: General Precautions, Fall Risk  Vision: Impaired  Hearing: Within functional limits    PAIN: 0-at this time    SUBJECTIVE:  Pt lives alone-her only son has passed away-she has a brother in [place] and a sister close by along with good neighbors    VISION:  Visual Impairment-prosthetic R eye and L neglect    HEARING: Within Normal Limit    LEISURE INTERESTS:   Pt has a personal care attendant that assists her at home-Pt listens to music, she use to enjoy her house work and taking care of the lawn, she enjoys watching [employer] on tv-very pleasant and social-attends [employer]-her brother was visiting from [place] today and she is going to miss him when he goes home this weekend     BARRIERS TO LEISURE INTERESTS:    Decreased endurance and Impaired vision      Patient Education  New Education Provided: Importance of Leisure, RT Plan of Care    Plan:  Continue to follow patient through this admission  See patient individually    Electronically signed by: WALTER Lr  Date: 7/9/2021

## 2021-07-09 NOTE — PROGRESS NOTES
stay:   Your estimated length of stay is  14 days   Your insurance Coverage has been verified as follows:    Primary Insurance: Medicare    Deductible: [id number]  Coverage: Active  Secondary Insurance:;secondary insurance policies often cover co-pay amounts, but to ensure payment please contact your insurance company.     Alternative Resources: Please ask the  for more information [phone]

## 2021-07-10 LAB
ERYTHROCYTE [DISTWIDTH] IN BLOOD BY AUTOMATED COUNT: 14.6 % (ref 11.5–14.5)
ERYTHROCYTE [DISTWIDTH] IN BLOOD BY AUTOMATED COUNT: 52.9 FL (ref 35–45)
GLUCOSE BLD-MCNC: 145 MG/DL (ref 70–108)
GLUCOSE BLD-MCNC: 87 MG/DL (ref 70–108)
GLUCOSE BLD-MCNC: 88 MG/DL (ref 70–108)
GLUCOSE BLD-MCNC: 97 MG/DL (ref 70–108)
HCT VFR BLD CALC: 39.1 % (ref 37–47)
HEMOGLOBIN: 11.9 GM/DL (ref 12–16)
MCH RBC QN AUTO: 30.1 PG (ref 26–33)
MCHC RBC AUTO-ENTMCNC: 30.4 GM/DL (ref 32.2–35.5)
MCV RBC AUTO: 99 FL (ref 81–99)
PLATELET # BLD: 163 THOU/MM3 (ref 130–400)
PMV BLD AUTO: 12.5 FL (ref 9.4–12.4)
RBC # BLD: 3.95 MILL/MM3 (ref 4.2–5.4)
WBC # BLD: 12.2 THOU/MM3 (ref 4.8–10.8)

## 2021-07-10 PROCEDURE — 97530 THERAPEUTIC ACTIVITIES: CPT

## 2021-07-10 PROCEDURE — 97129 THER IVNTJ 1ST 15 MIN: CPT

## 2021-07-10 PROCEDURE — 6370000000 HC RX 637 (ALT 250 FOR IP): Performed by: EMERGENCY MEDICINE

## 2021-07-10 PROCEDURE — 1180000000 HC REHAB R&B

## 2021-07-10 PROCEDURE — 36415 COLL VENOUS BLD VENIPUNCTURE: CPT

## 2021-07-10 PROCEDURE — 6370000000 HC RX 637 (ALT 250 FOR IP): Performed by: PHYSICAL MEDICINE & REHABILITATION

## 2021-07-10 PROCEDURE — 97116 GAIT TRAINING THERAPY: CPT

## 2021-07-10 PROCEDURE — 97535 SELF CARE MNGMENT TRAINING: CPT

## 2021-07-10 PROCEDURE — 85027 COMPLETE CBC AUTOMATED: CPT

## 2021-07-10 PROCEDURE — 97110 THERAPEUTIC EXERCISES: CPT

## 2021-07-10 PROCEDURE — 97112 NEUROMUSCULAR REEDUCATION: CPT

## 2021-07-10 PROCEDURE — 92526 ORAL FUNCTION THERAPY: CPT

## 2021-07-10 PROCEDURE — 82948 REAGENT STRIP/BLOOD GLUCOSE: CPT

## 2021-07-10 RX ADMIN — SERTRALINE 50 MG: 50 TABLET, FILM COATED ORAL at 10:04

## 2021-07-10 RX ADMIN — HYDRALAZINE HYDROCHLORIDE 25 MG: 25 TABLET ORAL at 21:46

## 2021-07-10 RX ADMIN — LEVETIRACETAM 500 MG: 500 TABLET, FILM COATED ORAL at 21:44

## 2021-07-10 RX ADMIN — ATORVASTATIN CALCIUM 10 MG: 10 TABLET, FILM COATED ORAL at 21:45

## 2021-07-10 RX ADMIN — LEVETIRACETAM 500 MG: 500 TABLET, FILM COATED ORAL at 10:04

## 2021-07-10 RX ADMIN — PANTOPRAZOLE SODIUM 40 MG: 40 TABLET, DELAYED RELEASE ORAL at 06:22

## 2021-07-10 RX ADMIN — SERTRALINE 50 MG: 50 TABLET, FILM COATED ORAL at 21:45

## 2021-07-10 RX ADMIN — ISOSORBIDE MONONITRATE 30 MG: 30 TABLET ORAL at 10:04

## 2021-07-10 RX ADMIN — ALPRAZOLAM 0.25 MG: 0.25 TABLET ORAL at 21:38

## 2021-07-10 RX ADMIN — DOCUSATE SODIUM 100 MG: 100 CAPSULE ORAL at 21:46

## 2021-07-10 RX ADMIN — DEXAMETHASONE 3 MG: 1.5 TABLET ORAL at 06:14

## 2021-07-10 RX ADMIN — DEXAMETHASONE 3 MG: 1.5 TABLET ORAL at 14:40

## 2021-07-10 RX ADMIN — ACETAMINOPHEN 650 MG: 325 TABLET ORAL at 21:38

## 2021-07-10 RX ADMIN — LEVETIRACETAM 500 MG: 500 TABLET, FILM COATED ORAL at 21:45

## 2021-07-10 RX ADMIN — LEVOTHYROXINE SODIUM 100 MCG: 0.1 TABLET ORAL at 06:14

## 2021-07-10 RX ADMIN — POLYETHYLENE GLYCOL (3350) 17 G: 17 POWDER, FOR SOLUTION ORAL at 10:05

## 2021-07-10 RX ADMIN — ONDANSETRON 4 MG: 4 TABLET, ORALLY DISINTEGRATING ORAL at 21:38

## 2021-07-10 RX ADMIN — HYDRALAZINE HYDROCHLORIDE 25 MG: 25 TABLET ORAL at 06:13

## 2021-07-10 RX ADMIN — Medication 5 DROP: at 10:08

## 2021-07-10 RX ADMIN — DOCUSATE SODIUM 100 MG: 100 CAPSULE ORAL at 10:05

## 2021-07-10 RX ADMIN — HYDRALAZINE HYDROCHLORIDE 25 MG: 25 TABLET ORAL at 14:40

## 2021-07-10 RX ADMIN — DEXAMETHASONE 3 MG: 1.5 TABLET ORAL at 21:46

## 2021-07-10 ASSESSMENT — PAIN DESCRIPTION - LOCATION: LOCATION: HEAD

## 2021-07-10 ASSESSMENT — PAIN SCALES - GENERAL
PAINLEVEL_OUTOF10: 7
PAINLEVEL_OUTOF10: 0
PAINLEVEL_OUTOF10: 0

## 2021-07-10 ASSESSMENT — PAIN DESCRIPTION - PAIN TYPE: TYPE: ACUTE PAIN

## 2021-07-10 NOTE — PROGRESS NOTES
LIPASE 12.6 08/12/2020         Electronically signed by Modesta An MD on 7/10/21 at 1:14 PM FADIT                                                                                           Darrin Gonzalez M.D.

## 2021-07-10 NOTE — PROGRESS NOTES
[address] [facility] THERAPY  [facility]  DAILY NOTE    TIME   SLP Individual Minutes  Time In: 8455  Time Out: 0840  Minutes: 30  Timed Code Treatment Minutes: 18 Minutes   Cognitive therapy: 18 minutes  Dysphagia therapy: 12 minutes       Date: 7/10/2021  Patient Name: Cesar Valdivia      CSN: [id number]   : 1933  (80 y.o.)  Gender: female   Referring Physician: Darinel Traylor MD  Diagnosis: S/p craniotomy   Secondary Diagnosis: Dysphagia, cognitive linguistic deficits  Precautions: Fall risk, aspiration precautions   Current Diet: Regular, thin liquids   Swallowing Strategies: Full Upright Position, Small Bite/Sip and Alternate Solids and Liquids     Date of Last MBS/FEES: Not Applicable    Pain:  No pain reported. Subjective:  Pt in bathroom upon arrival to complete toileting+oral care routines in conjunction with RN Kita Choi. Transfer to recliner chair post ADLs with pt requiring cues from nursing for appropriate sequential analysis to complete stand<>sit transfer. Positive participation throughout therapeutic interventions with pleasant demeanor. Short-Term Goals:  SHORT TERM GOAL #1:  Goal 1: Pt will consume a regular diet and thin liquids without overt s/s of aspiration to meet nutritional/hydration measures safely. INTERVENTIONS: Dietary analysis completed during breakfast meal consistent for pancake+syrup, christian, and thin coffee via cup in adherence to recommended diet level of regular solids+thin liquids. Min assist required for set-up of meal tray and opening of packages/cartoning with increased time required for visual scanning/locating on tray s/t compromised visual acuity. Oral phase relatively unremarkable with pt demonstrating adequate mastication pattern for textural breakdown, cohesive bolus formation, coordination, and oral clearance (no evidence for oral stasis).  Thin coffee via cup consumed with what appeared to be adequate control/containment and timely pharyngeal swallow trigger. No overt s/s aspiration exhibited across all consistencies/trials consumed (~75% meal consumption), certainly not able to r/o totality of airway invasion events and/or pharyngeal dysfunction at bedside alone; pt's swallow physiology appears functional to support goal for comfortable oral intake without distress. Recommendations to maintain regular diet+thin liquids, f/u dietary analysis to be completed x1 during lunch meal. Anticipate approaching of baseline diet level+swalolw function status. SHORT TERM GOAL #2:  Goal 2: Pt will complete functional immediate/delayed/prospective recall tasks (inclusion of compensatory strategies) with 70% accuracy, mod cues to improve retention of pertinent information. INTERVENTIONS: Orientation: 4/6 independent, 1/6 logical cues for date, 1/6 min cues to refer to external aid of calendar    5-unit recall  Topic: details relevant to treating ST  Strategies: writing information down (independent, min cues needed for organization), repetition/reherasal (x3 trials)   -Immediate memory: 3/5 independent, 1/5 min cues, 1/5 F02   -Delay x8 minutes: 2/5 min cues, 1/5 mod cues, 2/5 F02    Pt with positive insight into deficits r/t recall, stating, \"It's pretty bad. I can't get my thinking brain on\". Prior to admission, pt endorsed usage of a calendar for recall of medical appointments/birthdates/social events and for \"note taking\" in home setting to assist with functional retention. Will continue with further training of compensatory strategies in subsequent therapy session. SHORT TERM GOAL #3:  Goal 3: Pt will complete problem solving, verbal/visual reasoning, and executive functioning tasks (safety time, basic money/math comprehensive of preset up pill box) with 60% accuracy, mod cues to improve contribution within ADLs/IADLs.   INTERVENTIONS: Problem solving:  -Call light: independent locating and discerning of established safety procedure with simulation x1 conducted to promote carryover; 3/3 justifications for usage independently     SHORT TERM GOAL #4:  Goal 4: Pt will complete sequencing and thought organization tasks (i.e. divergent naming, multi step commands, complex yes/no questions) with 70% accuracy, mod cues to improve mental flexibility and processing speed. INTERVENTIONS: DNT d/t focus on additional STGs. See subjective above for concerns for sequencing skills at date. SHORT TERM GOAL #5:  Goal 5: Pt will complete functional attention tasks (i.e. sustained, selective, alternating) with no more than x3-4 errors/redirections within task completion for successful management of ADL's post discharge. INTERVENTIONS: Adequate sustained attention for entirety of 30 minute therapy session with no re-directions required. Long-Term Goals:  Timeframe for Long-term Goals: 2 weeks:  Goal 1: Pt will improve cognitive-linguistic skills to a supervision level of assist for safe, functional return to home setting with family support post discharge. ONGOING   Goal 2: Pt will safely consume regular diet+thin liquids demonstrating independent carryover of trained compensatory swallowing strategies and without overt s/s aspiration or pulmonary compromise to assist with nutrition/hydration measures.  ONGOING       Comprehension: 3 - Patient understands basic needs 50-74% of the time  Expression: 4 - Expresses basic ideas/needs 75-90%+ of the time  Social Interaction: 4 - Patient appropriate 75-90%+ of the time  Problem Solvin - Patient solves simple/routine tasks 25%-49%  Memory: 3 - Patient remembers 50%-74% of the time    EDUCATION:  Learner: Patient  Education:  Reviewed diet and strategies, Reviewed signs, symptoms and risks of aspiration, Reviewed ST goals and Plan of Care and Reviewed recommendations for follow-up  Evaluation of Education: Demonstrates with assistance, Needs further instruction and Family not present    ASSESSMENT/PLAN:  Activity Tolerance:  Patient tolerance of  treatment: good. Cooperative with ST and POC. Assessment/Plan: Patient progressing toward established goals. Continues to require skilled care of licensed speech pathologist to progress toward achievement of established goals and plan of care. .     Plan for Next Session: sequencing, thought organization, selective attention        Tariq Armendariz M.A., [address] [place] [address]

## 2021-07-10 NOTE — PROGRESS NOTES
[address] [address]  INPATIENT PHYSICAL THERAPY  DAILY NOTE  [facility] - [id number]    Time In: 1330  Time Out: 1430  Timed Code Treatment Minutes: 60 Minutes  Minutes: 60          Date: 7/10/2021  Patient Name: Abby Taylor,  Gender:  female        MRN: [id number]  : 1933  (80 y.o.)  Referral Date : 21  Referring Practitioner: Giselle Phillips MD  Diagnosis: Breast Cancer Metastisized to brain, right  Additional Pertinent Hx: Per H&P:Latanya Rod is a 80 y.o. female admitted to [address] [place] [address] on 2021. 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 ([address] Carotid stenosis, Colostomy in place [facility]), Degenerative arthritis of cervical spine, GERD (gastroesophageal reflux disease), Hypercholesteremia, Hypoestrogenism, Hypothyroidism, Lumbago, Lumbosacral spinal stenosis, MDRO (multiple drug resistant organisms) resistance, Personal history of cardiac dysrhythmia, Sigmoid diverticulosis, Spondylolisthesis of lumbosacral region, Steroid-induced hyperglycemia, Subclavian artery stenosis ([address] Superior and Inferior Pubic ramus fracture, right, closed, initial encounter ([address] SVT (supraventricular tachycardia) ([address] Temporal arteritis ([address] Vertebral artery occlusion, and Vitamin D deficiency. Patient presented to [facility] ER after suffering a fall at home. Patient was attempting to ambulate when her legs gave out and she hit her nightstand. No LOC. She was too weak to get up. Her [place] aide arrived and found her on the floor and called EMS. Patient underwent CT head and found to have right side brain mass as well as small subarachnoid hemorrhage. She is s/p debulking of the brain mass per Dr. Yolie Devine 2021; Biopsy was c/w metastatic disease from her breast cancer.      Prior Level of Function:  Lives With: Alone  Type of Home: House  Home Layout: One level  Home Access: Level entry  [address]: 4 wheeled walker, Desmond Doctor, [facility] chair, [address] bed   Bathroom Shower/Tub: Tub/Shower unit, Shower chair with back  Bathroom Toilet: Standard  Bathroom Equipment: Shower chair, 3-in-1 commode    Receives Help From: Personal care attendant (Monday-Friday 2 hours)  ADL Assistance: Needs assistance  Homemaking Assistance: Needs assistance  Homemaking Responsibilities: Yes  Ambulation Assistance: Independent  Transfer Assistance: Independent  Active : No  Additional Comments: 2 hours/day DIVINA has an aide that assists with ADLs/IADLs. nieces live nearby and able to help at times. Restrictions/Precautions:  Restrictions/Precautions: General Precautions, Fall Risk  Position Activity Restriction  Other position/activity restrictions: L neglect, R artificial eye     SUBJECTIVE: Pt sitting in chair upon arrival and agreeable to therapy. Pt pleasant and cooperative. PAIN: denies pain, Pt stated she had back pain earlier but has pain patches on now and pain deminshed    Vitals: Vitals not assessed per clinical judgement, see nursing flowsheet    OBJECTIVE:  Bed Mobility:  Supine to Sit: Stand By Assistance  Sit to Supine: Stand By Assistance     Transfers:  Sit to Stand: [employer] cues for hand placement  Stand to Sit:Stand By Assistance  Toilet transfer- CGA: Contact Guard Assistance    Ambulation:  Contact Guard Assistance, Minimal Assistance, Pt requires Min A with manuevering around obstacles due to decrease vision. Distance: 40ft X2  Surface: Level Tile  Device:Rolling Walker  Gait Deviations: Forward Flexed Posture, Slow Gillian, Decreased Step Length Bilaterally and Mild Path Deviations    Balance:  Static Standing Balance: Contact Guard Assistance with 1 UE support on RW while assisting to don undergarments  Dynamic standing balance: CGA, reaching outside [place] for rings and tossing them to target. Pt also reaching for cones.  Occasionally patient would overshoot target when reaching for items. Washing hands in bathroom while reaching for soap dispenser and paper towels. Exercise:  Patient was guided in 1 set(s) 10 reps of exercise to both lower extremities. Ankle pumps, Glut sets, Quad sets, Heelslides, Hip abduction/adduction Bilateral and ipsilateral (Min. A), Bridges and Lower trunk rotations. Exercises were completed for increased independence with functional mobility. Functional Outcome Measures: Not completed       ASSESSMENT:  Assessment: Patient progressing toward established goals. Activity Tolerance:  Patient tolerance of  treatment: good. Pt tolerated treatment well. Pt has difficulty with tight space when ambulating due to vision disturbances and L neglect. Equipment Recommendations:Equipment Needed: Yes (RW, wheelchair)  Other: wheelchair, RW  Discharge Recommendations:  Continue to assess pending progress    Plan: Times per week: 5x/wk 90min, 1x/wk 30min  Times per day: Daily  Current Treatment Recommendations: Strengthening, Transfer Training, Endurance Training, Neuromuscular Re-education, Patient/Caregiver Education & Training, Balance Training, Gait Training, Wheelchair Mobility Training, Home Exercise Program, Functional Mobility Training, Stair training, Safety Education & Training    Patient Education  Patient Education: Plan of Care, Transfers, Gait,  - Patient Verbalized Understanding    Goals:  Patient goals : go home  Short term goals  Time Frame for Short term goals: 1 week  Short term goal 1: Patient will complete supine < > sit with modified independence to transfer in/out of bed safely. Short term goal 2: Patient will complete sit < > stand with SBA and verbal cues to less than or equal to 50% of the time to stand to ambulate with decreased difficulty. Short term goal 3: Patient will ambulate 76' with a RW and CGA to progress towards ambulating household distances safely.   Short term goal 4: Patient will propel wheelchair 100' with SBA to increase independence and mobility. Long term goals  Time Frame for Long term goals : 4 weeks  Long term goal 1: Patient will complete sit < > stand and stand pivot transfers with modified independence to transfer surface to surface safely. Long term goal 2: Patient will ambulate 80' with a RW and supervision to navigate home with decreased difficulty. Long term goal 3: Patient will improve tinetti to greater than or equal to 19/28 to reduce her risk for falls. Long term goal 4: Patient will ascend/descend 1 step with a RW and CGA for community entry. Following session, patient left in safe position with all fall risk precautions in place.

## 2021-07-10 NOTE — PROGRESS NOTES
Dr. Jessie Childers consulted per Dr. Emile Dakin request. Updated via telephone of her current condition, blurry vision of her right eye, c/o her forehead \"feeling funny\" and elevated WBCs. Dr. Jessie Childers plans to see her tomorrow.

## 2021-07-10 NOTE — PROGRESS NOTES
[address]  Occupational Therapy  Daily Note  Time:   Time In: 0900  Time Out: 09  Timed Code Treatment Minutes: 30 Minutes  Minutes: 30          Date: 7/10/2021  Patient Name: Sherry Miles,   Gender: female      Room: [facility]64/064-A  MRN: [id number]  : 1933  (80 y.o.)  Referring Practitioner: Luz Tran MD  Diagnosis: breast cancer metastasized to the brain, right  Additional Pertinent Hx: Sherry Miles is a 80 y.o. female who presents to the emergency department for evaluation of fall. Patient has had multiple falls over the past couple weeks, refused EMS transfer each time. Today patient had a fall, striking her right temple. Patient is on Plavix. 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 Only pain that she is complaining of is to her right temple. Per MRI: Heterogeneous 5.5 cm nodular peripherally enhancing cystic and solid mass centered at the right occipital/temporal lobe junction with prominent adjacent T2/FLAIR prolongation involving the corpus callosum, temporal, parietal and frontal lobes with prominent mass effect on the right lateral ventricle and 9 mm leftward midline shift. (High grade glioma, glioblastoma multiform). Pt is S/P R craniotomy on 21. Pt admitted to inpatient rehab unit on 2021    Restrictions/Precautions:  Restrictions/Precautions: General Precautions, Fall Risk  Position Activity Restriction  Other position/activity restrictions: L neglect, R artificial eye     SUBJECTIVE: patient standing with nursing upon arrival; heading to bathroom upon arrival; agreeable to therapy with this therapist taking over session.   Patient pleasant and cooperative throughout session    PAIN: 0/10:     Vitals: Vitals not assessed per clinical judgement, see nursing flowsheet    COGNITION: Decreased Insight, Decreased Problem Solving, Decreased Safety Awareness, Difficulty Following Commands and Impulsive    ADL:   Grooming: Contact Guard Assistance. standing at sink to wash hands with verbal/visual cues for scanning  Lower Extremity Dressing: Stand By Assistance. seated in bedside chair in order to doff/colby B socks with verbal cues for sequencing  Toileting: Minimal Assistance. rear periarea thoroughness, increased time, CGA for clothing management  Toilet Transfer: Minimal Assistance. in order to ascend x 2 trials. BALANCE:  Sitting Balance:  Supervision. bedside chair  Standing Balance: Contact Guard Assistance. with RW, no LOB    BED MOBILITY:  Not Tested    TRANSFERS:  Sit to Stand:  Contact Guard Assistance. bedside chair to RW  Stand to Sit: [employer]. vebal cues for body positioning    FUNCTIONAL MOBILITY:  Assistive Device: Rolling Walker  Assist Level:  Contact Guard Assistance. Distance: To and from bathroom  Verbal cues for scanning while locating destination with increased time; no LOB    ASSESSMENT:     Activity Tolerance:  Patient tolerance of  treatment: good. Discharge Recommendations: Patient would benefit from continued therapy after discharge, Continue to assess pending progress, Home with Home health OT   Equipment Recommendations:    Plan: Times per week: 5x/wk for 90 min & 1x/wk for 30 min  Current Treatment Recommendations: Strengthening, Balance Training, Neuromuscular Re-education, Functional Mobility Training, Endurance Training, Home Management Training, Patient/Caregiver Education & Training, Self-Care / ADL, Safety Education & Training  Plan Comment: Pt demonstrates decline from baseline, requiring increased assistance due to decreased endurance and vision. Pt will benefit from skilled OT services to improve functional mobility and self care ADl routine.     Patient Education  Patient Education: Plan of Care, ADL's, Home Safety and Importance of Increasing Activity    Goals  Short term goals  Time Frame for Short term goals: 2 weeks  Short term goal 1: Pt will complete visual scanning to L side with min cues during ADLs to decrease fall risk during ADLs. Short term goal 2: Pt will complete LB ADL with Morales and adaptive techs to increase indep with self care. Short term goal 3: Pt will complete dynamic standing task with B hand release and SBA x 4 min to increase balance required for ADL completion. Short term goal 4: Pt will complete functional mobility to/from BR with AD and SBA to increase indep with self care. Long term goals  Time Frame for Long term goals : 4 weeks  Long term goal 1: Pt will complete light ADL MOD I to improve indep in preparing a cup of coffee. Long term goal 2: Pt will complete ADL routine including shower transfer with S and adaptive techs to increase indep with self care. Long term goal 3: Pt will complete dynamic standing task with B hand release and S x 10 min to increase balance required for ADL completion. Long term goal 4: Pt will complete functional mobility to/from BR with AD and MOD I to increase indep with self care. Long term goal 5: Pt will complete toileting routine including transfer with S to increase indep. Following session, patient left in safe position with all fall risk precautions in place.

## 2021-07-10 NOTE — PROGRESS NOTES
[address] . [facility] [address] [place] [address]  Occupational Therapy  Daily Note  Time:    Time In: 1006  Time Out: 1106  Timed Code Treatment Minutes: 60 Minutes  Minutes: 60          Date: 7/10/2021  Patient Name: Deborah Virk,   Gender: female      Room: [facility]64/064-A  MRN: [id number]  : 1933  (80 y.o.)  Referring Practitioner: Dayana Cintron MD  Diagnosis: breast cancer metastasized to the brain, right  Additional Pertinent Hx: Deborah Virk is a 80 y.o. female who presents to the emergency department for evaluation of fall. Patient has had multiple falls over the past couple weeks, refused EMS transfer each time. Today patient had a fall, striking her right temple. Patient is on Plavix. 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 Only pain that she is complaining of is to her right temple. Per MRI: Heterogeneous 5.5 cm nodular peripherally enhancing cystic and solid mass centered at the right occipital/temporal lobe junction with prominent adjacent T2/FLAIR prolongation involving the corpus callosum, temporal, parietal and frontal lobes with prominent mass effect on the right lateral ventricle and 9 mm leftward midline shift. (High grade glioma, glioblastoma multiform). Pt is S/P R craniotomy on 21. Pt admitted to inpatient rehab unit on 2021    Restrictions/Precautions:  Restrictions/Precautions: General Precautions, Fall Risk  Position Activity Restriction  Other position/activity restrictions: L neglect, R artificial eye      SUBJECTIVE: cooperative, moderate cues for locating items to the left field of vision. Fair comprehension of adapted tech and recall of where she had placed items for dressing/ simple kitchen tasks.      PAIN: 0/10: denied pain    Vitals: Vitals not assessed per clinical judgement, see nursing flowsheet    COGNITION: Decreased Recall, Decreased Insight, Impaired Memory, Inattention and Decreased Problem Solving    ADL:   Feeding: Supervision. to locate items to the left side of the tray table. Discussed placement of the tray table on a specific location for consistency. Pt stated it is OK and doesn't  matter where placed. PT then proceeded to attempt to set water glass to the opposite side of the tray table. Grooming: Stand By Assistance. standing to rinse dentures  Lower Extremity Dressing: Minimal Assistance. to change socks . min cues for item location after handing them to pt  Toileting: [employer]. Toilet Transfer: Minimal Assistance. from [facility] height toilet. Macey Downing BALANCE:  Sitting Balance:  Supervision. Standing Balance: Stand By Assistance. BED MOBILITY:  Not Tested    TRANSFERS:  Sit to Stand:  Contact Guard Assistance. Stand to Sit: Contact Guard Assistance. FUNCTIONAL MOBILITY:  Assistive Device: Rolling Walker  Assist Level:  Contact Guard Assistance. Distance: To and from bathroom and within the therapy apartment, min cues for RW safety and placement. ADDITIONAL ACTIVITIES:  Completed simple kitchen task of retreiving items from overhead shelf , transporting items to and from the microwave. Mod cues for scanning to the left, mod cues for safety with transferring items with coordination of the RW and  Advancing the cup. Educated pt on low vision of adapting the microwave for ease of locating buttons. Pt able to locate the markers on the microwave for locating the buttons, mod cues for problem solving to push the 30 sec button x 2 trials for selecting 60 seconds. ASSESSMENT:     Activity Tolerance:  Patient tolerance of  treatment: good.         Discharge Recommendations: Patient would benefit from continued therapy after discharge, Continue to assess pending progress, Home with Home health OT    Equipment Recommendations:    Plan: Times per week: 5x/wk for 90 min & 1x/wk for 30 min  Current Treatment Recommendations: Strengthening, Balance

## 2021-07-10 NOTE — PLAN OF CARE
Problem: Falls - Risk of:  Goal: Will remain free from falls  Description: Will remain free from falls  Outcome: Ongoing    No noted falls. Continues with alarms in place. Poor safety awareness. Problem: Skin Integrity:  Goal: Will show no infection signs and symptoms  Description: Will show no infection signs and symptoms  Outcome: Ongoing    Bruising continues around her R eye. No other skin breakdown noted. Goal: Absence of new skin breakdown  Description: Absence of new skin breakdown  Outcome: Ongoing    No noted skin breakdown. Repositioning routinely. Problem: Pain:  Goal: Pain level will decrease  Description: Pain level will decrease  Outcome: Ongoing    Denies pain. States \"my head just feels funny\"     Problem: Mobility - Impaired:  Goal: Mobility will improve  Description: Mobility will improve  Outcome: Ongoing    Ambulates with a front wheeled walker and 1 Min A and a gaitbelt. Impaired vision. Problem: Infection - Surgical Site:  Goal: Will show no infection signs and symptoms  Description: Will show no infection signs and symptoms  Outcome: Ongoing    Head dressing dry and intact     Problem: Discharge Planning:  Goal: Patients continuum of care needs are met  Description: Patients continuum of care needs are met  Outcome: Ongoing    Discharge planning continues with no tentative date. Voiced she would like to go home to pick out a spot to bury her dog that has to be put to sleep on Tuesday. Advised to discuss this with social work, therapy, and doctor on Monday. Problem: Serum Glucose Level - Abnormal:  Goal: Ability to maintain appropriate glucose levels has stabilized  Description: Ability to maintain appropriate glucose levels has stabilized  Outcome: Ongoing    Sugars stable. Teaching provided to her on the side effects of dexamethasone on her sugars and when to contact the doctor.      Problem: Mood - Altered:  Goal: Mood stable  Description: Mood stable  Outcome: Ongoing    Calm and cooperative. Problem: IP COMMUNICATION/DYSARTHRIA  Goal: LTG - Patient will improve receptive language skills to allow for completion of daily activities  Outcome: Ongoing    Had to intermittently repeat instruction d/t slow receipt at times. Problem: IP SWALLOWING  Goal: LTG - patient will tolerate the least restrictive diet consistency to allow for safe consumption of daily meals  Outcome: Ongoing    No difficulty swallowing. Problem: IP DRESSINGS LOWER EXTREMITIES  Goal: LTG - Patient will safely utilize adaptive techniques/equipment to dress lower body  Outcome: Ongoing    Therapy continues.      Problem: IP BALANCE  Goal: LTG - Patient will maintain balance to allow for safe/functional mobility  Outcome: Ongoing    Unsteady     Problem: IP MOBILITY  Goal: LTG - patient will ambulate household distance  Outcome: Ongoing

## 2021-07-11 LAB
ERYTHROCYTE [DISTWIDTH] IN BLOOD BY AUTOMATED COUNT: 14.6 % (ref 11.5–14.5)
ERYTHROCYTE [DISTWIDTH] IN BLOOD BY AUTOMATED COUNT: 52.5 FL (ref 35–45)
GLUCOSE BLD-MCNC: 162 MG/DL (ref 70–108)
GLUCOSE BLD-MCNC: 178 MG/DL (ref 70–108)
GLUCOSE BLD-MCNC: 193 MG/DL (ref 70–108)
GLUCOSE BLD-MCNC: 83 MG/DL (ref 70–108)
HCT VFR BLD CALC: 37.8 % (ref 37–47)
HEMOGLOBIN: 11.3 GM/DL (ref 12–16)
MCH RBC QN AUTO: 29.4 PG (ref 26–33)
MCHC RBC AUTO-ENTMCNC: 29.9 GM/DL (ref 32.2–35.5)
MCV RBC AUTO: 98.4 FL (ref 81–99)
PLATELET # BLD: 137 THOU/MM3 (ref 130–400)
PMV BLD AUTO: 12.1 FL (ref 9.4–12.4)
RBC # BLD: 3.84 MILL/MM3 (ref 4.2–5.4)
WBC # BLD: 10.9 THOU/MM3 (ref 4.8–10.8)

## 2021-07-11 PROCEDURE — 6370000000 HC RX 637 (ALT 250 FOR IP): Performed by: NURSE PRACTITIONER

## 2021-07-11 PROCEDURE — 1180000000 HC REHAB R&B

## 2021-07-11 PROCEDURE — 94760 N-INVAS EAR/PLS OXIMETRY 1: CPT

## 2021-07-11 PROCEDURE — 36415 COLL VENOUS BLD VENIPUNCTURE: CPT

## 2021-07-11 PROCEDURE — 82948 REAGENT STRIP/BLOOD GLUCOSE: CPT

## 2021-07-11 PROCEDURE — 6370000000 HC RX 637 (ALT 250 FOR IP): Performed by: EMERGENCY MEDICINE

## 2021-07-11 PROCEDURE — 6370000000 HC RX 637 (ALT 250 FOR IP): Performed by: PHYSICAL MEDICINE & REHABILITATION

## 2021-07-11 PROCEDURE — 85027 COMPLETE CBC AUTOMATED: CPT

## 2021-07-11 RX ORDER — DEXAMETHASONE 4 MG/1
2 TABLET ORAL EVERY 8 HOURS SCHEDULED
Status: DISCONTINUED | OUTPATIENT
Start: 2021-07-12 | End: 2021-07-14

## 2021-07-11 RX ADMIN — ATORVASTATIN CALCIUM 10 MG: 10 TABLET, FILM COATED ORAL at 21:20

## 2021-07-11 RX ADMIN — DEXAMETHASONE 3 MG: 1.5 TABLET ORAL at 21:20

## 2021-07-11 RX ADMIN — SERTRALINE 50 MG: 50 TABLET, FILM COATED ORAL at 21:20

## 2021-07-11 RX ADMIN — INSULIN LISPRO 1 UNITS: 100 INJECTION, SOLUTION INTRAVENOUS; SUBCUTANEOUS at 17:20

## 2021-07-11 RX ADMIN — LEVETIRACETAM 500 MG: 500 TABLET, FILM COATED ORAL at 08:40

## 2021-07-11 RX ADMIN — LEVETIRACETAM 500 MG: 500 TABLET, FILM COATED ORAL at 21:20

## 2021-07-11 RX ADMIN — Medication 3 MG: at 21:20

## 2021-07-11 RX ADMIN — ALPRAZOLAM 0.25 MG: 0.25 TABLET ORAL at 21:20

## 2021-07-11 RX ADMIN — ACETAMINOPHEN 650 MG: 325 TABLET ORAL at 04:48

## 2021-07-11 RX ADMIN — Medication 5 DROP: at 21:20

## 2021-07-11 RX ADMIN — HYDRALAZINE HYDROCHLORIDE 25 MG: 25 TABLET ORAL at 21:20

## 2021-07-11 RX ADMIN — DEXAMETHASONE 3 MG: 1.5 TABLET ORAL at 08:39

## 2021-07-11 RX ADMIN — DEXAMETHASONE 3 MG: 1.5 TABLET ORAL at 14:44

## 2021-07-11 RX ADMIN — ISOSORBIDE MONONITRATE 30 MG: 30 TABLET ORAL at 08:40

## 2021-07-11 RX ADMIN — PANTOPRAZOLE SODIUM 40 MG: 40 TABLET, DELAYED RELEASE ORAL at 08:39

## 2021-07-11 RX ADMIN — LEVOTHYROXINE SODIUM 100 MCG: 0.1 TABLET ORAL at 08:40

## 2021-07-11 RX ADMIN — HYDRALAZINE HYDROCHLORIDE 25 MG: 25 TABLET ORAL at 08:40

## 2021-07-11 RX ADMIN — DOCUSATE SODIUM 100 MG: 100 CAPSULE ORAL at 21:20

## 2021-07-11 RX ADMIN — HYDRALAZINE HYDROCHLORIDE 25 MG: 25 TABLET ORAL at 14:44

## 2021-07-11 RX ADMIN — ACETAMINOPHEN 650 MG: 325 TABLET ORAL at 08:40

## 2021-07-11 RX ADMIN — Medication 5 DROP: at 08:33

## 2021-07-11 RX ADMIN — DOCUSATE SODIUM 100 MG: 100 CAPSULE ORAL at 08:40

## 2021-07-11 RX ADMIN — SERTRALINE 50 MG: 50 TABLET, FILM COATED ORAL at 08:39

## 2021-07-11 ASSESSMENT — PAIN SCALES - GENERAL
PAINLEVEL_OUTOF10: 5
PAINLEVEL_OUTOF10: 0
PAINLEVEL_OUTOF10: 2

## 2021-07-11 NOTE — PROGRESS NOTES
Pt. C/o headache at this time but has been resting quietly tonight. Pt. sept abut 6 hrs of unbroken sleep. Pt. Medicated with PRN Tylenol. Will monitor.

## 2021-07-11 NOTE — PLAN OF CARE
Problem: Falls - Risk of:  Goal: Will remain free from falls  Description: Will remain free from falls  Outcome: Ongoing    No noted falls. Poor safety awareness and vision. Needs step by step cuing maneuvering room. Problem: Skin Integrity:  Goal: Will show no infection signs and symptoms  Description: Will show no infection signs and symptoms  Outcome: Ongoing    Bruising continues to R eye    Goal: Absence of new skin breakdown  Description: Absence of new skin breakdown  Outcome: Ongoing    No new skin breakdown. Problem: Pain:  Goal: Pain level will decrease  Description: Pain level will decrease  Outcome: Ongoing    C/o headache this AM. Resolved with Tylenol, Dexamethasone, cool wash cloth to her forehead, and warm blanket behind her head. Problem: Mobility - Impaired:  Goal: Mobility will improve  Description: Mobility will improve  Outcome: Ongoing    Ambulated in her room with 1 Min A and a walker. Needs step by step cuing. Unable to see objects very close to her and gets turned around in the bathroom and her room. At times she walks the opposite direction when Left or Right is stated but responds the appropriate reaction if she is tapped on the arm of the direction she needs to walk. Problem: Infection - Surgical Site:  Goal: Will show no infection signs and symptoms  Description: Will show no infection signs and symptoms  Outcome: Ongoing    Head dressing dry and intact     Problem: Discharge Planning:  Goal: Patients continuum of care needs are met  Description: Patients continuum of care needs are met  Outcome: Ongoing    Would like to discharge d/t her ill dog. Her niece, Kentrell Khan, was in to visit, is concerned with her discharging at this time, and is encouraging her to continue her rehabilitation.      Problem: Serum Glucose Level - Abnormal:  Goal: Ability to maintain appropriate glucose levels has stabilized  Description: Ability to maintain appropriate glucose levels has stabilized  Outcome: Ongoing    Chems stable. Problem: Mood - Altered:  Goal: Mood stable  Description: Mood stable  Outcome: Ongoing    Calm, cooperative, pleasant. Anxious in the AM, but resolved with ambulation, toileting, and assistance to chair. Problem: IP COMMUNICATION/DYSARTHRIA  Goal: LTG - Patient will improve receptive language skills to allow for completion of daily activities  Outcome: Ongoing    Noted difficulty. Easily distracted. Problem: IP SWALLOWING  Goal: LTG - patient will tolerate the least restrictive diet consistency to allow for safe consumption of daily meals  Outcome: Ongoing    Eating without difficulty. Requires meal set up with verbal explanation of placement d/t poor vision. Problem: IP DRESSINGS LOWER EXTREMITIES  Goal: LTG - Patient will safely utilize adaptive techniques/equipment to dress lower body  Outcome: Ongoing    Required assistance with applying socks and pants around her feet.  Maneuvers pants up and down with stand by assist.     Problem: IP BALANCE  Goal: LTG - Patient will maintain balance to allow for safe/functional mobility  Outcome: Ongoing    Unsteady     Problem: IP MOBILITY  Goal: LTG - patient will ambulate household distance  Outcome: Ongoing    Therapy continues

## 2021-07-11 NOTE — PROGRESS NOTES
Family Medicine Progress Notes/Coverage    Today's Date: 7/11/21  7E-64/064-A  Medical Record # [id number]  CSN/Account # [de-identified]      Ms. Rod admitted on 7/8/2021        Subjective / Interval History :     \"When is Dr Shantell Farooq coming? \"  Eating well, otherwise doing well, No SOB, No chest pain , No fatigue. No nausea nor abdominal pain  Reviewed notes, consults, laboratory and radiology results,    Objective:       Physical Exam:  Patient Vitals for the past 24 hrs:   BP Temp Temp src Pulse Resp SpO2 Weight   07/11/21 0830 (!) 177/75 98.4 °F (36.9 °C) Oral 67 16 94 % --   07/11/21 1476 -- -- -- -- -- -- 136 lb (61.7 kg)   07/10/21 2138 122/82 98 °F (36.7 °C) Oral 82 -- 97 % --   07/10/21 1430 131/62 -- -- 69 16 97 % --       General Appearance:  Alert, cooperative, no distress, appears stated age   HEENT:  Neck: + dressing     Chest/Lungs:  Heart: CTA  RRR   Abdomen:  Back: soft     Extremities:  Neurological Exam: No edema  WNL       Assessment:     Admitting Diagnosis:    Breast cancer metastasized to brain, right ([facility] [C50.911, C79.31]    Active Hospital Problems    Diagnosis Date Noted    Impaired functional mobility, balance, gait, and endurance [Z74.09] 07/08/2021     Priority: High    Breast cancer metastasized to brain, right ([address] [C50.911, C79.31] 07/06/2021     Priority: High    Status post craniotomy [Z98.890] 07/02/2021     Priority: High    Subarachnoid hemorrhage following injury, no loss of consciousness ([facility] [address] [S06.6X0A] 06/28/2021     Priority: High    Fall [W19. XXXA] 06/28/2021     Priority: High    Fall at home, initial encounter [X79. Yamile Mishra, O14.662] 06/28/2021     Priority: Medium    Current chronic use of systemic steroids [Z79.52] 09/04/2018     Priority: Medium    Coronary artery disease involving native coronary artery of native heart without angina pectoris [I25.10] 11/06/2017     Priority: Medium    Anxiety [F41.9] 06/29/2016     Priority: Medium    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]      Priority: Medium    Hypothyroidism due to acquired atrophy of thyroid [E03.4] 03/03/2016     Priority: Medium    Temporal arteritis ([facility] [address] [M31.6]      Priority: Medium    GERD (gastroesophageal reflux disease) [K21.9]      Priority: Medium       Plan:     Discussed plans with the nursing staff  Continue rehab    Medications, Laboratories and Imaging results:    Scheduled Meds:   lidocaine  2 patch Topical Daily    polyethylene glycol  17 g Oral Daily    atorvastatin  10 mg Oral Nightly    dexamethasone  3 mg Oral 3 times per day    docusate sodium  100 mg Oral BID    hydrALAZINE  25 mg Oral 3 times per day    insulin lispro  0-6 Units Subcutaneous TID WC    insulin lispro  0-3 Units Subcutaneous Nightly    isosorbide mononitrate  30 mg Oral Daily    levETIRAcetam  500 mg Oral BID    levothyroxine  100 mcg Oral Daily    pantoprazole  40 mg Oral QAM AC    sertraline  50 mg Oral BID    carbamide peroxide  5 drop Both Ears BID     Continuous Infusions:   dextrose       PRN Meds:melatonin, acetaminophen, magnesium hydroxide, ondansetron **OR** [DISCONTINUED] ondansetron, fleet, ALPRAZolam, dextrose, dextrose, glucagon (rDNA), glucose    Imaging:    Lab Review :    Lab Results   Component Value Date    WBC 10.9 (H) 07/11/2021    HGB 11.3 (L) 07/11/2021    HCT 37.8 07/11/2021    MCV 98.4 07/11/2021     07/11/2021     Lab Results   Component Value Date    CREATININE 0.5 07/09/2021    BUN 28 (H) 07/09/2021     07/09/2021    K 4.8 07/09/2021     07/09/2021    CO2 30 07/09/2021     Lab Results   Component Value Date    CKTOTAL 70 06/28/2021     Lab Results   Component Value Date    ALT 81 (H) 07/06/2021    AST 78 (H) 07/06/2021    ALKPHOS 53 07/06/2021    BILITOT 0.5 07/06/2021     Lab Results   Component Value Date    LIPASE 12.6 08/12/2020 Electronically signed by Wallace France MD on 7/11/21 at 10:07 AM AMELIA Siddiqi M.D.

## 2021-07-11 NOTE — PROGRESS NOTES
Pt. C/o headache at about a 8-9 on pain scale to top of head. Pt. States that this makes her feel nauseous also. Pt. Medicated with PRN Zofran, Tylenol and xanax since pt is anxious and tearful. Pt. Ambulates with difficulty due to vision issues and is confused at times Ice applied to top of head and TLC and reassurance given. Will monitor

## 2021-07-11 NOTE — PLAN OF CARE
Problem: Falls - Risk of:  Goal: Will remain free from falls  Description: Will remain free from falls  7/11/2021 0056 by Viktoria Rincon LPN  Outcome: Met This Shift  Note: Pt. Uses call light for assist.  7/10/2021 1636 by Natty Guillermo RN  Outcome: Ongoing  Goal: Absence of physical injury  Description: Absence of physical injury  Outcome: Met This Shift     Problem: Skin Integrity:  Goal: Will show no infection signs and symptoms  Description: Will show no infection signs and symptoms  7/11/2021 0056 by Viktoria Rincon LPN  Outcome: Met This Shift  7/10/2021 1636 by Natty Guillermo RN  Outcome: Ongoing  Goal: Absence of new skin breakdown  Description: Absence of new skin breakdown  7/11/2021 0056 by Viktoria Rincon LPN  Outcome: Met This Shift  7/10/2021 1636 by Natty Guillermo RN  Outcome: Ongoing     Problem: Pain:  Goal: Pain level will decrease  Description: Pain level will decrease  7/11/2021 0056 by Viktoria Rincon LPN  Outcome: Ongoing  7/10/2021 1636 by Natty Guillermo RN  Outcome: Ongoing  Goal: Control of acute pain  Description: Control of acute pain  Outcome: Ongoing  Note: Pt. Medicated with PRN Tylenol as needed fr pain. Pt. C/o headache. Tylenol effective     Problem: Pain:  Goal: Pain level will decrease  Description: Pain level will decrease  7/11/2021 0056 by Viktoria Rincon LPN  Outcome: Ongoing  7/10/2021 1636 by Natty Guillermo RN  Outcome: Ongoing  Goal: Control of acute pain  Description: Control of acute pain  Outcome: Ongoing  Note: Pt. Medicated with PRN Tylenol as needed fr pain. Pt. C/o headache.  Tylenol effective     Problem: Mobility - Impaired:  Goal: Mobility will improve  Description: Mobility will improve  7/11/2021 0056 by Viktoria Rincon LPN  Outcome: Ongoing  7/10/2021 1636 by Natty Guillermo RN  Outcome: Ongoing     Problem: Infection - Surgical Site:  Goal: Will show no infection signs and symptoms  Description: Will show no infection signs and symptoms  7/11/2021 0056 by Pao Rincon LPN  Outcome: Met This Shift  7/10/2021 1636 by Garland Naranjo RN  Outcome: Ongoing     Problem: Discharge Planning:  Goal: Patients continuum of care needs are met  Description: Patients continuum of care needs are met  7/11/2021 0056 by Pao Rincon LPN  Outcome: Ongoing  7/10/2021 1636 by Garland Naranjo RN  Outcome: Ongoing     Problem: Serum Glucose Level - Abnormal:  Goal: Ability to maintain appropriate glucose levels has stabilized  Description: Ability to maintain appropriate glucose levels has stabilized  7/11/2021 0056 by Pao Rincon LPN  Outcome: Ongoing  7/10/2021 1636 by Garland Naranjo RN  Outcome: Ongoing     Problem: Mood - Altered:  Goal: Mood stable  Description: Mood stable  7/11/2021 0056 by Pao Rincon LPN  Outcome: Met This Shift  7/10/2021 1636 by Garland Naranjo RN  Outcome: Ongoing     Problem: IP COMMUNICATION/DYSARTHRIA  Goal: LTG - Patient will improve receptive language skills to allow for completion of daily activities  7/10/2021 1636 by Garland Naranjo RN  Outcome: Ongoing     Problem: IP SWALLOWING  Goal: LTG - patient will tolerate the least restrictive diet consistency to allow for safe consumption of daily meals  7/10/2021 1636 by Garland Naranjo RN  Outcome: Ongoing     Problem: IP DRESSINGS LOWER EXTREMITIES  Goal: LTG - Patient will safely utilize adaptive techniques/equipment to dress lower body  7/10/2021 1636 by Garland Naranjo RN  Outcome: Ongoing     Problem: IP BALANCE  Goal: LTG - Patient will maintain balance to allow for safe/functional mobility  7/10/2021 1636 by Garland Naranjo RN  Outcome: Ongoing     Problem: IP MOBILITY  Goal: LTG - patient will ambulate household distance  7/10/2021 1636 by Garland Naranjo RN  Outcome: Ongoing

## 2021-07-12 LAB
ERYTHROCYTE [DISTWIDTH] IN BLOOD BY AUTOMATED COUNT: 14.5 % (ref 11.5–14.5)
ERYTHROCYTE [DISTWIDTH] IN BLOOD BY AUTOMATED COUNT: 52.5 FL (ref 35–45)
GLUCOSE BLD-MCNC: 108 MG/DL (ref 70–108)
GLUCOSE BLD-MCNC: 111 MG/DL (ref 70–108)
GLUCOSE BLD-MCNC: 130 MG/DL (ref 70–108)
GLUCOSE BLD-MCNC: 93 MG/DL (ref 70–108)
HCT VFR BLD CALC: 37.5 % (ref 37–47)
HEMOGLOBIN: 11.2 GM/DL (ref 12–16)
MCH RBC QN AUTO: 29.8 PG (ref 26–33)
MCHC RBC AUTO-ENTMCNC: 29.9 GM/DL (ref 32.2–35.5)
MCV RBC AUTO: 99.7 FL (ref 81–99)
PLATELET # BLD: 143 THOU/MM3 (ref 130–400)
PMV BLD AUTO: 12 FL (ref 9.4–12.4)
RBC # BLD: 3.76 MILL/MM3 (ref 4.2–5.4)
WBC # BLD: 10.6 THOU/MM3 (ref 4.8–10.8)

## 2021-07-12 PROCEDURE — 97116 GAIT TRAINING THERAPY: CPT

## 2021-07-12 PROCEDURE — 97129 THER IVNTJ 1ST 15 MIN: CPT

## 2021-07-12 PROCEDURE — 85027 COMPLETE CBC AUTOMATED: CPT

## 2021-07-12 PROCEDURE — 97112 NEUROMUSCULAR REEDUCATION: CPT

## 2021-07-12 PROCEDURE — 97130 THER IVNTJ EA ADDL 15 MIN: CPT

## 2021-07-12 PROCEDURE — 6360000002 HC RX W HCPCS: Performed by: INTERNAL MEDICINE

## 2021-07-12 PROCEDURE — 82948 REAGENT STRIP/BLOOD GLUCOSE: CPT

## 2021-07-12 PROCEDURE — 99232 SBSQ HOSP IP/OBS MODERATE 35: CPT | Performed by: NURSE PRACTITIONER

## 2021-07-12 PROCEDURE — 97530 THERAPEUTIC ACTIVITIES: CPT

## 2021-07-12 PROCEDURE — 97110 THERAPEUTIC EXERCISES: CPT

## 2021-07-12 PROCEDURE — 6370000000 HC RX 637 (ALT 250 FOR IP): Performed by: NURSE PRACTITIONER

## 2021-07-12 PROCEDURE — 97535 SELF CARE MNGMENT TRAINING: CPT

## 2021-07-12 PROCEDURE — 1180000000 HC REHAB R&B

## 2021-07-12 PROCEDURE — 36415 COLL VENOUS BLD VENIPUNCTURE: CPT

## 2021-07-12 PROCEDURE — 6370000000 HC RX 637 (ALT 250 FOR IP): Performed by: EMERGENCY MEDICINE

## 2021-07-12 RX ADMIN — HYDRALAZINE HYDROCHLORIDE 25 MG: 25 TABLET ORAL at 20:50

## 2021-07-12 RX ADMIN — Medication 5 DROP: at 09:15

## 2021-07-12 RX ADMIN — Medication 3 MG: at 20:50

## 2021-07-12 RX ADMIN — DEXAMETHASONE 2 MG: 4 TABLET ORAL at 20:49

## 2021-07-12 RX ADMIN — LEVETIRACETAM 500 MG: 500 TABLET, FILM COATED ORAL at 09:13

## 2021-07-12 RX ADMIN — PANTOPRAZOLE SODIUM 40 MG: 40 TABLET, DELAYED RELEASE ORAL at 06:07

## 2021-07-12 RX ADMIN — SERTRALINE 50 MG: 50 TABLET, FILM COATED ORAL at 09:13

## 2021-07-12 RX ADMIN — SERTRALINE 50 MG: 50 TABLET, FILM COATED ORAL at 20:50

## 2021-07-12 RX ADMIN — LEVOTHYROXINE SODIUM 100 MCG: 0.1 TABLET ORAL at 06:07

## 2021-07-12 RX ADMIN — ALPRAZOLAM 0.25 MG: 0.25 TABLET ORAL at 20:51

## 2021-07-12 RX ADMIN — DEXAMETHASONE 2 MG: 4 TABLET ORAL at 13:04

## 2021-07-12 RX ADMIN — ATORVASTATIN CALCIUM 10 MG: 10 TABLET, FILM COATED ORAL at 20:49

## 2021-07-12 RX ADMIN — Medication 5 DROP: at 20:50

## 2021-07-12 RX ADMIN — DOCUSATE SODIUM 100 MG: 100 CAPSULE ORAL at 09:13

## 2021-07-12 RX ADMIN — DEXAMETHASONE 2 MG: 4 TABLET ORAL at 06:07

## 2021-07-12 RX ADMIN — ISOSORBIDE MONONITRATE 30 MG: 30 TABLET ORAL at 09:13

## 2021-07-12 RX ADMIN — LEVETIRACETAM 500 MG: 500 TABLET, FILM COATED ORAL at 20:50

## 2021-07-12 RX ADMIN — DOCUSATE SODIUM 100 MG: 100 CAPSULE ORAL at 20:50

## 2021-07-12 RX ADMIN — HYDRALAZINE HYDROCHLORIDE 25 MG: 25 TABLET ORAL at 06:07

## 2021-07-12 ASSESSMENT — PAIN SCALES - GENERAL: PAINLEVEL_OUTOF10: 0

## 2021-07-12 NOTE — PROGRESS NOTES
[address] [place] [address]  INPATIENT PHYSICAL THERAPY  Progress Note  [facility] - 7E-64/064-A    Time In: 1107  Time Out: 1207  Timed Code Treatment Minutes: 60 Minutes  Minutes: 60          Date: 2021  Patient Name: Cheyanne Adames,  Gender:  female        MRN: [id number]  : 1933  (80 y.o.)  Referral Date : 21  Referring Practitioner: Vikas Dorado MD  Diagnosis: Breast Cancer Metastisized to brain, right  Additional Pertinent Hx: Per H&P:Latanya Rod is a 80 y.o. female admitted to [address] [place] [address] on 2021. 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 ([address] Carotid stenosis, Colostomy in place [facility]), Degenerative arthritis of cervical spine, GERD (gastroesophageal reflux disease), Hypercholesteremia, Hypoestrogenism, Hypothyroidism, Lumbago, Lumbosacral spinal stenosis, MDRO (multiple drug resistant organisms) resistance, Personal history of cardiac dysrhythmia, Sigmoid diverticulosis, Spondylolisthesis of lumbosacral region, Steroid-induced hyperglycemia, Subclavian artery stenosis ([address] Superior and Inferior Pubic ramus fracture, right, closed, initial encounter ([address] SVT (supraventricular tachycardia) ([address] Temporal arteritis ([address] Vertebral artery occlusion, and Vitamin D deficiency. Patient presented to [facility] ER after suffering a fall at home. Patient was attempting to ambulate when her legs gave out and she hit her nightstand. No LOC. She was too weak to get up. Her [facility] [place] aide arrived and found her on the floor and called EMS. Patient underwent CT head and found to have right side brain mass as well as small subarachnoid hemorrhage. She is s/p debulking of the brain mass per Dr. Areli Villegas 2021; Biopsy was c/w metastatic disease from her breast cancer.      Prior Level of Function:  Lives With: Alone  Type of Home: House  Home Layout: One level  Home Access: Level entry  Home Equipment: 4 wheeled walker, Cane, [address] chair, [address] [facility] bed    Vitals: Vitals not assessed per clinical judgement, see nursing flowsheet    Restrictions/Precautions:  Restrictions/Precautions: General Precautions, Fall Risk  Position Activity Restriction  Other position/activity restrictions: L neglect, R artificial eye    SUBJECTIVE:     PAIN: denies    OBJECTIVE:  Bed Mobility:  Supine to Sit: Modified Independent  Sit to Supine: Modified Independent    With bed rail--has hospital bed at home    Transfers:  Sit to Stand: Contact Guard Assistance  Stand to [place] [id number], Minimal Assistance--occasional requiring min assist and guidance due to impaired vision  Stand Pivot:Contact Guard Assistance  -Patient completed several sit  <> stand transfers from various surfaces, focusing on improved safety, including hand placement, positioning and 4WW brake management. *Verbal cues for hand placement with sit to stand 50-75% of the time. *Verbal cues to lock 4WW brakes prior to sitting down, fair carryover    Ambulation:  Contact Guard Assistance  Distance: 80', short distances in therapy gym  Surface: Level Tile  Device:Rolling Walker  Gait Deviations: Forward Flexed Posture, Slow Gillian, Decreased Gait Speed, Decreased Heel Strike Bilaterally, Mild Path Deviations and Moderate Path Deviations  *Verbal cues to keep body at safe distance from [employer] Assistance  Distance: 50', 140'  Surface: Level Tile  Device:4 Wheeled Walker  Gait Deviations:   Forward Flexed Posture, Slow Gillian, Decreased Step Length Bilaterally, Decreased Gait Speed, Decreased Heel Strike Bilaterally and Mild Path Deviations  *Verbal cues to keep body at safe distance from [place]    Wheelchair Mobility:  Contact Guard Assistance, Minimal Assistance  Extremities Used: Bilateral Upper Extremities  Type of Chair:Manual  Surface: Level Tile  Distance: ~75'  Quality: Slow Velocity, Short Strokes, Veers Training, Balance Training, Gait Training, Wheelchair Mobility Training, Home Exercise Program, Functional Mobility Training, Stair training, Safety Education & Training    Patient Education  Patient Education: Transfers, Gait, Verbal Exercise Instruction,  - Patient Verbalized Understanding, - Patient Requires Continued Education    Goals:  Patient goals : go home  Short term goals  Time Frame for Short term goals: 1 week  Short term goal 1: Patient will complete supine < > sit with modified independence to transfer in/out of bed safely. GOAL MET  Short term goal 2: Patient will complete sit < > stand with SBA and verbal cues to less than or equal to 50% of the time to stand to ambulate with decreased difficulty. GOAL NOT MET, CONTINUE  Short term goal 3: Patient will ambulate 76' with a RW and CGA to progress towards ambulating household distances safely. GOAL MET, SEE LTG  Short term goal 4: Patient will propel wheelchair 100' with SBA to increase independence and mobility. GOAL NOT MET, CONTINUE  Long term goals  Time Frame for Long term goals : 4 weeks  Long term goal 1: Patient will complete sit < > stand and stand pivot transfers with modified independence to transfer surface to surface safely. GOAL NOT MET, CONTINUE  Long term goal 2: Patient will ambulate 150' with a RW and supervision to navigate home with decreased difficulty. GOAL NOT MET, CONTINUE  Long term goal 3: Patient will improve tinetti to greater than or equal to 19/28 to reduce her risk for falls. GOAL NOT MET, CONTINUE  Long term goal 4: Patient will ascend/descend 1 step with a RW and CGA for community entry. GOAL NOT MET/ASSESSED    Revised Short-Term Goals:    Short term goals  Time Frame for Short term goals: 1 week  Short term goal 1: Patient will complete supine < > sit with modified independence to transfer in/out of bed safely.  GOAL MET  Short term goal 2: Patient will complete sit < > stand with SBA and verbal cues to less than or equal to 50% of the time to stand to ambulate with decreased difficulty. Short term goal 3: Patient will ambulate 76' with a RW and CGA to progress towards ambulating household distances safely. GOAL MET, SEE LTG  Short term goal 4: Patient will propel wheelchair 100' with SBA to increase independence and mobility. Revised Long-Term Goals  Long term goals  Time Frame for Long term goals : 4 weeks  Long term goal 1: Patient will complete sit < > stand and stand pivot transfers with modified independence to transfer surface to surface safely. Long term goal 2: Patient will ambulate 80' with a RW and supervision to navigate home with decreased difficulty. Long term goal 3: Patient will improve tinetti to greater than or equal to 19/28 to reduce her risk for falls. Long term goal 4: Patient will ascend/descend 1 step with a RW and CGA for community entry.   Long term goal 5: Patient will propel wheelchair 100' with modified independence to navigate living envionment/community safely

## 2021-07-12 NOTE — PROGRESS NOTES
consumed with what appeared to be adequate control/containment and timely pharyngeal swallow trigger. No overt s/s aspiration exhibited across all consistencies/trials consumed (~75% meal consumption), certainly not able to r/o totality of airway invasion events and/or pharyngeal dysfunction at bedside alone; pt's swallow physiology appears functional to support goal for comfortable oral intake without distress. Recommendations to maintain regular diet+thin liquids, f/u dietary analysis to be completed x1 during lunch meal. Anticipate approaching of baseline diet level+swallow function status. SHORT TERM GOAL #2:  Goal 2: Pt will complete functional immediate/delayed/prospective recall tasks (inclusion of compensatory strategies) with 70% accuracy, mod cues to improve retention of pertinent information. GOAL MET  NEW GOAL: Patient will complete functional immediate/delayed/prospective recall tasks (inclusion of compensatory strategies) with 80% accuracy, min cues to improve retention of pertinent information. INTERVENTIONS:   Recall of ST Information (Four Units)  Immediate Recall: 2/4 independent, 1/4 with visual cuing, 1/4 with multiple choice cuing  5 Minute Delayed Recall: 4/4 independent  10 Minute Delayed Recall: 4/4 independent    Recall of WRAP Strategies: 4/4 independent  *Patient identified assistance with writing information down and repetition of information to facilitate success with independent recall. SHORT TERM GOAL #3:  Goal 3: Pt will complete problem solving, verbal/visual reasoning, and executive functioning tasks (safety time, basic money/math comprehensive of preset up pill box) with 60% accuracy, mod cues to improve contribution within ADLs/IADLs.   GOAL MET  NEW GOAL: Pt will complete problem solving, verbal/visual reasoning, and executive functioning tasks (safety time, basic money/math comprehensive of preset up pill box) with 70% accuracy, mod cues to improve contribution within ADLs/IADLs. INTERVENTIONS:   Setting Time on Analog Clock: 2/5 independent, 1/5 with minimal cuing, 2/5 with moderate cuing  Identifying Time on Analog Clock: 3/3 independent  *Patient with increased difficulty with task completion due to physical demands of task (e.g., clock hands moving in unison following patient placement of single hand). *Patient with improved identification of time rather than placement. Medical Safety Situations: 1/3 independent, 1/3 with logical cuing, 1/3 with maximum cuing  *Patient with excellent insight into medical/safety problem solving scenarios applicable to patient life/situation (e.g., use of life alert), however, with decreased mental flexibility evidenced given unknown scenario. SHORT TERM GOAL #4:  Goal 4: Pt will complete sequencing and thought organization tasks (i.e. divergent naming, multi step commands, complex yes/no questions) with 70% accuracy, mod cues to improve mental flexibility and processing speed. GOAL NOT MET, CONTINUE  INTERVENTIONS: DNT due to focus on additional STGs. SHORT TERM GOAL #5:  Goal 5: Pt will complete functional attention tasks (i.e. sustained, selective, alternating) with no more than x3-4 errors/redirections within task completion for successful management of ADL's post discharge. GOAL NOT MET, CONTINUE  INTERVENTIONS: Adequate sustained attention for entirety of 30 minute therapy session with no re-directions required. Long-Term Goals:  Timeframe for Long-term Goals: 2 weeks:  Goal 1: Pt will improve cognitive-linguistic skills to a supervision level of assist for safe, functional return to home setting with family support post discharge. ONGOING   Goal 2: Pt will safely consume regular diet+thin liquids demonstrating independent carryover of trained compensatory swallowing strategies and without overt s/s aspiration or pulmonary compromise to assist with nutrition/hydration measures.  ONGOING     ST FIM ASSESSMENT:   Admission score Current score Goal Status   COMMUNICATION 3 - Patient understands basic needs 50-74% of the time   3 - Patient understands basic needs 50-74% of the time   Stable   EXPRESSION 4 - Expresses basic ideas/needs 75-90%+ of the time     4 - Expresses basic ideas/needs 75-90%+ of the time   Stable   SOCIAL INTERACTION 4 - Patient appropriate 75-90%+ of the time   4 - Patient appropriate 75-90%+ of the time   Stable   PROBLEM SOLVING 2 - Patient solves simple/routine tasks 25%-49%   2 - Patient solves simple/routine tasks 25%-49%   Stable   MEMORY 3 - Patient remembers 50%-74% of the time   3 - Patient remembers 50%-74% of the time   Stable     Comprehension: 3 - Patient understands basic needs 50-74% of the time  Expression: 4 - Expresses basic ideas/needs 75-90%+ of the time  Social Interaction: 4 - Patient appropriate 75-90%+ of the time  Problem Solvin - Patient solves simple/routine tasks 25%-49%  Memory: 3 - Patient remembers 50%-74% of the time    SUMMARY: Patient achieved 2/5 STGs and 0/2 LTGs this progress reporting period. Patient with improved performance given functional recall tasks, presentation of functional safety tasks, and overall sustained attention, however, patient continues to present with the following identified needs: functional carryover with independent initiation of compensatory strategies, impaired verbal reasoning, mental flexibility, and poor alternating, divided, and selective attention. Patient currently safely consuming regular diet with thin liquids with NO overt difficulty identified at this time. Recommend continuation of dysphagia management at this time to allow for continued education/implementation of compensatory swallowing strategies and ensured safety/tolerance of diet recommendation.  Despite low cognitive demands present in home environment with excellent family support, patient would benefit from continued skilled ST services to address cognitive deficits and allow for safe return to ADLs and IADLs with improved independence and promote cognitive-linguistic skills to a supervision level of assist for safe, functional return to home setting post discharge. EDUCATION:  Learner: Patient  Education:  Reviewed ST goals and Plan of Care and Reviewed recommendations for follow-up  Evaluation of Education: Demonstrates with assistance, Needs further instruction and Family not present    ASSESSMENT/PLAN:  Activity Tolerance:  Patient tolerance of  treatment: good. Cooperative with ST and POC. Assessment/Plan: Patient progressing toward established goals. Continues to require skilled care of licensed speech pathologist to progress toward achievement of established goals and plan of care. .     Plan for Next Session: sequencing, thought organization, selective attention        Luke Ferreira M.S., [facility]

## 2021-07-12 NOTE — PLAN OF CARE
Problem: DISCHARGE BARRIERS  Goal: Patient's continuum of care needs are met  Note:   [facility]  Physical Medicine Case Management Assessment    [x] Inpatient Rehabilitation Unit      Patient Name: Krishna Baker        MRN: [id number]    : 1933  (80 y.o.)  Gender: female     Date of Admission: 2021      SW assessment completed on Friday, 2021. Family/Social/Home Environment: Prior to hospitalization, patient received assistance with ADL's and personal care. Patient lives alone. Patient has a brother, , that lives in [place]. Patient has a sister, [place], in [facility], but she has her own health issues (requires 24 hour care). Patient reports nieces-Jen, Galdino Amezcua, and Manuela-visit and provide support as needed. Patient reports niece, [place] Boast, visit more frequently. Patient reports having emergency alert button. Patient uses walker for ambulation. Patient reports pharmacy delivers medications and nurse (Pooja MCDONALD) fills medication box. Patient does not drive. Therefore, uses COA or family for transportation needs. Patient manages own finances. Patient has non-skilled HHA (Montrell Vilchis) Monday through Friday for 2 hours daily (usually from 8:00am to 10:00am). Patient reports HHA assists with bathing errands/groceries, meal preparation, housekeeping, and laundry. Patient reports goal to be able to return home with current support. Patient is agreeable to increased skilled services through home health at discharge.      Social/Functional History  Lives With: Alone  Type of Home: House  Home Layout: One level  Home Access: Level entry  Bathroom Shower/Tub: Tub/Shower unit, Shower chair with back  Bathroom Toilet: Standard  Bathroom Equipment: Shower chair, 3-in-1 commode  Home Equipment: 4 wheeled walker, [employer], [address] chair, [address] [facility] bed  Receives Help From: Personal care attendant (Monday-Friday 2 hours)  ADL Assistance: Needs assistance  Homemaking Assistance: Needs assistance  Homemaking Responsibilities: Yes  Ambulation Assistance: Independent  Transfer Assistance: Independent  Active : No  Patient's  Info: family  Additional Comments: 2 hours/day DIVINA has an aide that assists with ADLs/IADLs. nijose a live nearby and able to help at times. Contact/Guardian Information: Leda Zavala, [phone]. Jj Angel, [phone]. Community Resources Utilized: [employer] non-skilled services. Sexuality/Intimacy: No issues or concerns identified at time of SW assessment. Complementary Health Approaches: Patient with no current interest in complementary health approaches at time of SW assessment. Anticipated Needs/Discharge Plans: Anticipate patient would benefit from continued therapy upon discharge. SW met with patient to introduce self and explain role, complete SW assessment, and initiate discharge planning. Prior to hospitalization, patient received assistance with ADL's and personal care. Patient lives alone. Patient has a brother, , that lives in [place]. Patient has a sister, [place], in [facility], but she has her own health issues (requires 24 hour care). Patient reports nieces-Jen, Trevor Prasad, and Manuela-visit and provide support as needed. Patient reports Garry [place], visit more frequently. Patient reports having emergency alert button. Patient uses walker for ambulation. Patient reports pharmacy delivers medications and nurse (Pooja MCDONALD) fills medication box. Patient does not drive. Therefore, uses [employer] or family for transportation needs. Patient manages own finances. Patient has non-skilled HHA (Montrell Menchaca) Monday through Friday for 2 hours daily (usually from 8:00am to 10:00am). Patient reports HHA assists with bathing errands/groceries, meal preparation, housekeeping, and laundry. Patient reports goal to be able to return home with current support.  Patient is agreeable to increased skilled services through home health at discharge. Anticipate patient would benefit from continued therapy upon discharge. SW reviewed with patient team conference on Tuesday, 07/13/2021, to further discuss progress and discharge recommendations. SW to follow and maintain involvement in discharge planning.            Discharge Planning  Meds-to-Beds: Does the patient want to have any new prescriptions delivered to bedside prior to discharge?: No (Patient prefers to use [facility] on [address] for discharge medications.)  Expected Discharge Date:  (Undetermined)      Electronically signed by MIGEL Danielle on 7/12/2021 at 3:05 PM

## 2021-07-12 NOTE — PROGRESS NOTES
[address] [address]  Inpatient Rehabilitation  Occupational Therapy  Progress Note  Time:  Time In: 0900  Time Out: 1000  Timed Code Treatment Minutes: 60 Minutes  Minutes: 60          Date: 2021  Patient Name: Yaya Tony,   Gender: female      Room: [facility]64/064-A  MRN: [id number]  : 1933  (80 y.o.)  Referring Practitioner: Matthieu Riojas MD  Diagnosis: breast cancer metastasized to the brain, right  Additional Pertinent Hx: Yaya Tony is a 80 y.o. female who presents to the emergency department for evaluation of fall. Patient has had multiple falls over the past couple weeks, refused EMS transfer each time. Today patient had a fall, striking her right temple. Patient is on Plavix. 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 Only pain that she is complaining of is to her right temple. Per MRI: Heterogeneous 5.5 cm nodular peripherally enhancing cystic and solid mass centered at the right occipital/temporal lobe junction with prominent adjacent T2/FLAIR prolongation involving the corpus callosum, temporal, parietal and frontal lobes with prominent mass effect on the right lateral ventricle and 9 mm leftward midline shift. (High grade glioma, glioblastoma multiform). Pt is S/P R craniotomy on 21. Pt admitted to inpatient rehab unit on 2021    Restrictions/Precautions:  Restrictions/Precautions: General Precautions, Fall Risk  Position Activity Restriction  Other position/activity restrictions: L neglect, R artificial eye    SUBJECTIVE: Up in chair upon arrival, agreeable to OT session, pleasant and cooperative    PAIN: 0/10:     Vitals: Vitals not assessed per clinical judgement, see nursing flowsheet    COGNITION: Slow Processing, Decreased Recall, Decreased Insight, Impaired Memory, Decreased Problem Solving, Decreased Safety Awareness, Impulsive and Confusion noted    ADL:   Feeding: with set-up.   A for opening containers  Grooming: Stand By Assistance. completed standing sinkside while washing hands and brushing teeth  Upper Extremity Dressing: with set-up.  donned sitting at EOB  Lower Extremity Dressing: Stand By Assistance. donned sitting at EOB  Toileting: Stand By Assistance. for clothing management  Toilet Transfer: Stand By Assistance. STS, used grab bar. BALANCE:  Standing Balance: Stand By Assistance. greater than 10 minutes with 0-1 UE support    BED MOBILITY:  Not Tested    TRANSFERS:  Sit to Stand:  Contact Guard Assistance. EOB  Stand to Sit: Contact Guard Assistance. bedside chair    FUNCTIONAL MOBILITY:  Assistive Device: Rolling Walker  Assist Level:  Stand By Assistance. Distance: To and from bathroom  Slow pace       ASSESSMENT:  Activity Tolerance:  Patient tolerance of  treatment: good. Assessment: Pt is making good progress towards goals. Patient has met 1/4 goals. Able to complete LB dressing with SBA. Pt continues to require skilled Occupational Therapy to address the following performance deficits balance, endurance, safety awareness, and activity tolerance. Without skilled OT intervention pt is at risk for functional decline, increased falls, and readmission to hospital.     Discharge Recommendations: Patient would benefit from continued therapy after discharge, Continue to assess pending progress, Home with Home health OT  Equipment Recommendations: Other: 4 wheeled walker  Plan: Times per week: 5x/wk for 90 min & 1x/wk for 30 min  Current Treatment Recommendations: Strengthening, Balance Training, Neuromuscular Re-education, Functional Mobility Training, Endurance Training, Home Management Training, Patient/Caregiver Education & Training, Self-Care / ADL, Safety Education & Training  Plan Comment: Pt demonstrates decline from baseline, requiring increased assistance due to decreased endurance and vision.  Pt will benefit from skilled OT services to improve functional mobility and self care ADl routine. Patient Education  Patient Education: ADL's    Goals  Short term goals  Time Frame for Short term goals: 2 weeks  Short term goal 1: Pt will complete visual scanning to L side with min cues during ADLs to decrease fall risk during ADLs. -NOT MET CONTINUE  Short term goal 2: Pt will complete LB ADL with Morales and adaptive techs to increase indep with self care. -MET REVISE  Short term goal 3: Pt will complete dynamic standing task with B hand release and SBA x 4 min to increase balance required for ADL completion. -NOT MET CONTINUE  Short term goal 4: Pt will complete functional mobility to/from BR with AD and SBA to increase indep with self care. -NOT MET CONTINUE  Long term goals  Time Frame for Long term goals : 4 weeks  Long term goal 1: Pt will complete light ADL MOD I to improve indep in preparing a cup of coffee. -NOT MET CONTINUE  Long term goal 2: Pt will complete ADL routine including shower transfer with S and adaptive techs to increase indep with self care. -NOT MET CONTINUE  Long term goal 3: Pt will complete dynamic standing task with B hand release and S x 10 min to increase balance required for ADL completion. -NOT MET CONTINUE  Long term goal 4: Pt will complete functional mobility to/from BR with AD and MOD I to increase indep with self care. -NOT MET CONTINUE  Long term goal 5: Pt will complete toileting routine including transfer with S to increase indep. -NOT MET CONTINUE    Revised Short-Term Goals  Short term goals  Time Frame for Short term goals: 2 weeks  Short term goal 1: Pt will complete visual scanning to L side with min cues during ADLs to decrease fall risk during ADLs. Short term goal 2: Pt will complete LB ADL with set up and adaptive techs to increase indep with self care. Short term goal 3: Pt will complete dynamic standing task with B hand release and SBA x 4 min to increase balance required for ADL completion.   Short term goal 4: Pt will complete functional mobility to/from BR with AD and SBA to increase indep with self care. Long term goals  Time Frame for Long term goals : 4 weeks  Long term goal 1: Pt will complete light ADL MOD I to improve indep in preparing a cup of coffee. Long term goal 2: Pt will complete ADL routine including shower transfer with S and adaptive techs to increase indep with self care. Long term goal 3: Pt will complete dynamic standing task with B hand release and S x 10 min to increase balance required for ADL completion. Long term goal 4: Pt will complete functional mobility to/from BR with AD and MOD I to increase indep with self care. Long term goal 5: Pt will complete toileting routine including transfer with S to increase indep. Following session, patient left in safe position with all fall risk precautions in place.

## 2021-07-12 NOTE — PROGRESS NOTES
[address] [place] [address]  INPATIENT PHYSICAL THERAPY  DAILY NOTE  [facility] - [id number]    Time In: 1400  Time Out: 1430  Timed Code Treatment Minutes: 30 Minutes  Minutes: 30          Date: 2021  Patient Name: Elridge Mcardle,  Gender:  female        MRN: [id number]  : 1933  (80 y.o.)  Referral Date : 21  Referring Practitioner: Marly Chin MD  Diagnosis: Breast Cancer Metastisized to brain, right  Additional Pertinent Hx: Per H&P:Latanya Rod is a 80 y.o. female admitted to [address] [place] [address] on 2021. 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 ([address] Carotid stenosis, Colostomy in place [facility]), Degenerative arthritis of cervical spine, GERD (gastroesophageal reflux disease), Hypercholesteremia, Hypoestrogenism, Hypothyroidism, Lumbago, Lumbosacral spinal stenosis, MDRO (multiple drug resistant organisms) resistance, Personal history of cardiac dysrhythmia, Sigmoid diverticulosis, Spondylolisthesis of lumbosacral region, Steroid-induced hyperglycemia, Subclavian artery stenosis ([address] Superior and Inferior Pubic ramus fracture, right, closed, initial encounter ([address] SVT (supraventricular tachycardia) ([address] Temporal arteritis ([address] Vertebral artery occlusion, and Vitamin D deficiency. Patient presented to [facility] ER after suffering a fall at home. Patient was attempting to ambulate when her legs gave out and she hit her nightstand. No LOC. She was too weak to get up. [facility] arrived and found her on the floor and called EMS. Patient underwent CT head and found to have right side brain mass as well as small subarachnoid hemorrhage. She is s/p debulking of the brain mass per Dr. Jonny Mo 2021; Biopsy was c/w metastatic disease from her breast cancer.      Prior Level of Function:  Lives With: Alone  Type of Home: House  Home Layout: One level  Home Access: Level and supervision to navigate home with decreased difficulty. Long term goal 3: Patient will improve tinetti to greater than or equal to 19/28 to reduce her risk for falls. Long term goal 4: Patient will ascend/descend 1 step with a RW and CGA for community entry. Long term goal 5: Patient will propel wheelchair 100' with modified independence to navigate living envionment/community safely    Following session, patient left in safe position with all fall risk precautions in place.
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
## 2021-07-12 NOTE — PROGRESS NOTES
Physical Medicine & Rehabilitation   Progress Note    Chief Complaint:  Brain mass. Rehab needs    Subjective:  Patient seen up in chair. Patient states she had a good weekend and appreciated her day of rest yesterday. Patient with some concerns about her dog, she is 11 and needs to be put asleep. Patient states it has [facility] the [facility]\". Dog is staying with her niece at this time while she is in the hospital. Patient asking about a small wheelchair that her [employer] was working on for her. Discussed team conference tomorrow and can obtain w/c at discharge. Patient understanding. Patient denies any other needs or concerns at this time. +BM 7/10/21    Rehabilitation:  PT:   Bed Mobility:  Supine to Sit: Stand By Assistance  Sit to Supine: Stand By Assistance   Transfers:  Sit to Stand: [address] cues for hand placement  Stand to Sit:Stand By Assistance  Toilet transfer- CGA: Contact Guard Assistance  Ambulation:  Contact Guard Assistance, Minimal Assistance, Pt requires Min A with manuevering around obstacles due to decrease vision. Distance: 40ft X2  Surface: Level Tile  Device:Rolling Walker  Gait Deviations: Forward Flexed Posture, Slow Gillian, Decreased Step Length Bilaterally and Mild Path Deviations  Balance:  Static Standing Balance: Contact Guard Assistance with 1 UE support on RW while assisting to don undergarments  Dynamic standing balance: CGA, reaching outside [place] for rings and tossing them to target. Pt also reaching for cones. Occasionally patient would overshoot target when reaching for items. Washing hands in bathroom while reaching for soap dispenser and paper towels. Exercise:  Patient was guided in 1 set(s) 10 reps of exercise to both lower extremities. Ankle pumps, Glut sets, Quad sets, Heelslides, Hip abduction/adduction Bilateral and ipsilateral (Min. A), Bridges and Lower trunk rotations.   Exercises were completed for increased independence with functional mobility. OT:  COGNITION: Decreased Insight, Decreased Problem Solving, Decreased Safety Awareness, Difficulty Following Commands and Impulsive  ADL:   Grooming: Contact Guard Assistance. standing at sink to wash hands with verbal/visual cues for scanning  Lower Extremity Dressing: Stand By Assistance. seated in bedside chair in order to doff/colby B socks with verbal cues for sequencing  Toileting: Minimal Assistance. rear periarea thoroughness, increased time, CGA for clothing management  Toilet Transfer: Minimal Assistance. in order to ascend x 2 trials. BALANCE:  Sitting Balance:  Supervision. bedside chair  Standing Balance: Contact Guard Assistance. with RW, no LOB  TRANSFERS:  Sit to Stand:  Contact Guard Assistance. bedside chair to RW  Stand to Sit: [employer]. vebal cues for body positioning  FUNCTIONAL MOBILITY:  Assistive Device: Rolling Walker  Assist Level:  Contact Guard Assistance. Distance: To and from bathroom  Verbal cues for scanning while locating destination with increased time; no LOB    ST:   INTERVENTIONS: Dietary analysis completed during breakfast meal consistent for pancake+syrup, christian, and thin coffee via cup in adherence to recommended diet level of regular solids+thin liquids. Min assist required for set-up of meal tray and opening of packages/cartoning with increased time required for visual scanning/locating on tray s/t compromised visual acuity. Oral phase relatively unremarkable with pt demonstrating adequate mastication pattern for textural breakdown, cohesive bolus formation, coordination, and oral clearance (no evidence for oral stasis). Thin coffee via cup consumed with what appeared to be adequate control/containment and timely pharyngeal swallow trigger.  No overt s/s aspiration exhibited across all consistencies/trials consumed (~75% meal consumption), certainly not able to r/o totality of airway invasion events and/or pharyngeal 5' (1.524 m)   Wt 134 lb 14.4 oz (61.2 kg)   LMP  (LMP Unknown)   SpO2 93%   BMI 26.35 kg/m²   awake  Orientation:   person, place, time  Mood: within normal limits  Affect: calm  General appearance: Patient is well nourished, well developed, well groomed and in no acute distress     Memory:   normal,   Attention/Concentration: normal  Language:  normal     Cranial Nerves:  cranial nerves II-XII are grossly intact except chronic vision issues with right eye blindness and left eye cataracts  ROM:  normal  Motor Exam:  Motor exam is symmetrical 4+ out of 5 all extremities bilaterally  Tone:  normal  Muscle bulk: within normal limits  Sensory:  Sensory intact     Skin: warm and dry, no rash or erythema  Peripheral vascular: Pulses: Normal upper and lower extremity pulses; Edema: trace    Diagnostics:   Recent Results (from the past 24 hour(s))   POCT glucose    Collection Time: 07/11/21  5:17 PM   Result Value Ref Range    POC Glucose 178 (H) 70 - 108 mg/dl   POCT glucose    Collection Time: 07/11/21  7:36 PM   Result Value Ref Range    POC Glucose 193 (H) 70 - 108 mg/dl   POCT glucose    Collection Time: 07/11/21  9:23 PM   Result Value Ref Range    POC Glucose 162 (H) 70 - 108 mg/dl   CBC    Collection Time: 07/12/21  7:19 AM   Result Value Ref Range    WBC 10.6 4.8 - 10.8 thou/mm3    RBC 3.76 (L) 4.20 - 5.40 mill/mm3    Hemoglobin 11.2 (L) 12.0 - 16.0 gm/dl    Hematocrit 37.5 37.0 - 47.0 %    MCV 99.7 (H) 81.0 - 99.0 fL    MCH 29.8 26.0 - 33.0 pg    MCHC 29.9 (L) 32.2 - 35.5 gm/dl    RDW-CV 14.5 11.5 - 14.5 %    RDW-SD 52.5 (H) 35.0 - 45.0 fL    Platelets 098 408 - 854 thou/mm3    MPV 12.0 9.4 - 12.4 fL   POCT glucose    Collection Time: 07/12/21  8:22 AM   Result Value Ref Range    POC Glucose 108 70 - 108 mg/dl       Impression:  · Right brain mass - metastatic from breast  · Fall  · CHI with facial bruising  · Subarachnoid and parenchymal hemorrhage  · Traumatic ecchymosis to left lower leg and left forearm, right forehead ecchymosis  · Bilateral lower extremity weakness  · Hypotension  · Anxiety  · GERD  · SVT  · HLD  · Hyperglycemia, steroid induced  · Hx Breast cancer with mets to the liver    Plan:  Continue current therapies  Prophylaxis: DVT - SCDs/BARRY due to brain bleed, GI - Protonix  Pain: tylenol, lidoderm,   Bowels: Colace, fleets, MOM, miralax. +BM 7/10/21  Seizure: keppra  Anxiety: xanax, zoloft  Melatonin at night PRN  Ok for OTC spray for leg pain at night  Oncology decreasing decadron and plans Enhertu therapy once discharged  Patient asking about w/c, states passport was working on getting her one.    · Conference tuesdays       Missed Therapy Time:  · None    Laura Corona, APRN - CNP

## 2021-07-12 NOTE — PROGRESS NOTES
[address] [address]  Recreational Therapy  Daily Note  [address]    Time Spent with Patient: 25 minutes    Date:  2021       Patient Name: Sridhar Gauthier      MRN: [id number]      YOB: 1933 (80 y.o.)       Gender: female  Diagnosis: breast cancer metastasized to the brain, right  Referring Practitioner: Etienne Marquis MD    RESTRICTIONS/PRECAUTIONS:  Restrictions/Precautions: General Precautions, Fall Risk  Vision: Impaired  Hearing: Within functional limits    PAIN: 0- no c/o pain    SUBJECTIVE:  I was 1 of 14 children.     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

## 2021-07-12 NOTE — PROGRESS NOTES
[facility]  Hematology/ Oncology Progress Note     Pt Name: Jonatan Sandy  MRN: [id number]  [id number]  YOB: 1933  Admit Date: 7/8/2021  2:41 PM  Date of evaluation: 7/12/2021  Primary Care Physician: Charito Mercado MD   [id number]       SUBJECTIVE: Doing well. OBJECTIVE    Physical  VITALS:  BP (!) 121/52   Pulse 72   Temp 98.1 °F (36.7 °C) (Oral)   Resp 16   Ht 5' (1.524 m)   Wt 134 lb 14.4 oz (61.2 kg)   LMP  (LMP Unknown)   SpO2 98%   BMI 26.35 kg/m²   CONSTITUTIONAL:  awake  EYES:  ENT:  normocepalic, without obvious abnormality. Scar of surgery. NECK:  supple, symmetrical, trachea midline  HEMATOLOGIC/LYMPHATICS:  no cervical lymphadenopathy and no supraclavicular lymphadenopathy  LUNGS:  No increased work of breathing, good air exchange, clear to auscultation bilaterally, no crackles or wheezing  CARDIOVASCULAR:  normal apical pulses  ABDOMEN:  Soft non tender.   Data  Labs:  General Labs:    CBC:   Lab Results   Component Value Date    WBC 10.6 07/12/2021    RBC 3.76 07/12/2021    RBC 4.23 04/08/2019    HGB 11.2 07/12/2021    HCT 37.5 07/12/2021    MCV 99.7 07/12/2021    MCH 29.8 07/12/2021    MCHC 29.9 07/12/2021    RDW 17.0 04/08/2019     07/12/2021    MPV 12.0 07/12/2021     Current Medications  Current Facility-Administered Medications: dexamethasone (DECADRON) tablet 2 mg, 2 mg, Oral, 3 times per day  melatonin tablet 3 mg, 3 mg, Oral, Nightly PRN  acetaminophen (TYLENOL) tablet 650 mg, 650 mg, Oral, Q4H PRN  magnesium hydroxide (MILK OF MAGNESIA) 400 MG/5ML suspension 30 mL, 30 mL, Oral, Daily PRN  ondansetron (ZOFRAN-ODT) disintegrating tablet 4 mg, 4 mg, Oral, Q8H PRN **OR** [DISCONTINUED] ondansetron (ZOFRAN) injection 4 mg, 4 mg, Intravenous, Q6H PRN  fleet rectal enema 1 enema, 1 enema, Rectal, Daily PRN  lidocaine 4 % external patch 2 patch, 2 patch, Topical, Daily  polyethylene glycol (GLYCOLAX) packet 17 g, 17 g, Oral, Daily  ALPRAZolam (XANAX) tablet 0.25 mg, 0.25 mg, Oral, TID PRN  atorvastatin (LIPITOR) tablet 10 mg, 10 mg, Oral, Nightly  dextrose 5 % solution, 100 mL/hr, Intravenous, PRN  dextrose 50 % IV solution, 12.5 g, Intravenous, PRN  docusate sodium (COLACE) capsule 100 mg, 100 mg, Oral, BID  glucagon (rDNA) injection 1 mg, 1 mg, Intramuscular, PRN  glucose (GLUTOSE) 40 % oral gel 15 g, 15 g, Oral, PRN  hydrALAZINE (APRESOLINE) tablet 25 mg, 25 mg, Oral, 3 times per day  insulin lispro (HUMALOG) injection vial 0-6 Units, 0-6 Units, Subcutaneous, TID WC  insulin lispro (HUMALOG) injection vial 0-3 Units, 0-3 Units, Subcutaneous, Nightly  isosorbide mononitrate (IMDUR) extended release tablet 30 mg, 30 mg, Oral, Daily  levETIRAcetam (KEPPRA) tablet 500 mg, 500 mg, Oral, BID  levothyroxine (SYNTHROID) tablet 100 mcg, 100 mcg, Oral, Daily  pantoprazole (PROTONIX) tablet 40 mg, 40 mg, Oral, QAM AC  sertraline (ZOLOFT) tablet 50 mg, 50 mg, Oral, BID  carbamide peroxide (DEBROX) 6.5 % otic solution 5 drop, 5 drop, Both Ears, BID    ASSESSMENT AND PLAN  Patient Active Problem List:     GERD (gastroesophageal reflux disease)     Carotid stenosis     Temporal arteritis (HCC)     Hypercholesteremia     Hypothyroidism due to acquired atrophy of thyroid     Steroid-induced hyperglycemia     Blind right eye     Current chronic use of systemic steroids     Personal history of cardiac dysrhythmia     Coronary artery disease involving native coronary artery of native heart without angina pectoris     Anxiety     H/O: CVA (cerebrovascular accident)     Lumbosacral spinal stenosis     Vitamin D deficiency     Hx of breast cancer with liver mets     Breast cancer metastasized to liver [facility])     Fall     Traumatic ecchymosis of forehead     Brain mass     Subarachnoid hemorrhage following injury, no loss of consciousness (HCC)     Traumatic ecchymosis of left lower leg     Traumatic ecchymosis of left forearm     Platelet inhibition due to Plavix     Fall at home, initial encounter     Brain edema [facility])     Intraparenchymal hemorrhage of brain ([address]     Closed head injury     Breast cancer metastasized to brain, right [facility])     Status post craniotomy     Impaired functional mobility, balance, gait, and endurance    A 1 Breast Cancer Diagnosed in 2016 with Liver mets. With Complete Remission therapy 2 years       Ago. 2 Brain met from breast primary ER/MS negative. Her 2 positive S/P resection      3 Negative CT Chest, Abdomen and Pelvis for metastatic Disease. Plan[de-identified]  Tolerating  Decadron to 2 mg TID well. Blood sugars are down. Continue PT/OT              Will give Enhertu therapy once discharged. It crosses blood brain barrier.                   INÉS Perez,CWS  5:29 PM  7/12/2021

## 2021-07-12 NOTE — PROGRESS NOTES
FUNCTIONAL MOBILITY:  Assistive Device: 4 Wheeled Walker  Assist Level:  Contact Guard Assistance. Distance: to apartment,   Cues with locking/unlocking     ADDITIONAL ACTIVITIES:  Patient completed IADL homemaking task this date of preparing pot of coffee. Patient was able to retrieve all items from various levels with Moderate VC top locate with SBA for safety during the retrieval and transportation of items. Patient required SBA throughout task with a standing endurance of greater than 4 minutes with 1 UE support. ASSESSMENT:  Activity Tolerance:  Patient tolerance of  treatment: good. Assessment: Assessment: Pt is making good progress towards goals. Patient has met 1/4 goals. Able to complete LB dressing with SBA. Pt continues to require skilled Occupational Therapy to address the following performance deficits balance, endurance, safety awareness, and activity tolerance. Without skilled OT intervention pt is at risk for functional decline, increased falls, and readmission to hospital.  Discharge Recommendations: Patient would benefit from continued therapy after discharge, Continue to assess pending progress, Home with Home health OT  Equipment Recommendations: Other: 4 wheeled walker  Plan: Times per week: 5x/wk for 90 min & 1x/wk for 30 min  Current Treatment Recommendations: Strengthening, Balance Training, Neuromuscular Re-education, Functional Mobility Training, Endurance Training, Home Management Training, Patient/Caregiver Education & Training, Self-Care / ADL, Safety Education & Training  Plan Comment: Pt demonstrates decline from baseline, requiring increased assistance due to decreased endurance and vision. Pt will benefit from skilled OT services to improve functional mobility and self care ADl routine.     Patient Education  Patient Education: IADL's and Visual scanning    Goals  Short term goals  Time Frame for Short term goals: 2 weeks  Short term goal 1: Pt will complete visual scanning to L side with min cues during ADLs to decrease fall risk during ADLs. Short term goal 2: Pt will complete LB ADL with set up and adaptive techs to increase indep with self care. Short term goal 3: Pt will complete dynamic standing task with B hand release and SBA x 4 min to increase balance required for ADL completion. Short term goal 4: Pt will complete functional mobility to/from BR with AD and SBA to increase indep with self care. Long term goals  Time Frame for Long term goals : 4 weeks  Long term goal 1: Pt will complete light ADL MOD I to improve indep in preparing a cup of coffee. Long term goal 2: Pt will complete ADL routine including shower transfer with S and adaptive techs to increase indep with self care. Long term goal 3: Pt will complete dynamic standing task with B hand release and S x 10 min to increase balance required for ADL completion. Long term goal 4: Pt will complete functional mobility to/from BR with AD and MOD I to increase indep with self care. Long term goal 5: Pt will complete toileting routine including transfer with S to increase indep. Following session, patient left in safe position with all fall risk precautions in place.

## 2021-07-12 NOTE — PROGRESS NOTES
[address] [facility]  Hematology/ Oncology Progress Note     Pt Name: Meliton Tello  MRN: [id number]  [id number]  YOB: 1933  Admit Date: 7/8/2021  2:41 PM  Date of evaluation: 7/11/2021  Primary Care Physician: Wallace France MD   [id number]       SUBJECTIVE: Doing well. Left eye vision poor. Right eye artificial.    OBJECTIVE    Physical  VITALS:  BP (!) 169/72   Pulse 70   Temp 97.9 °F (36.6 °C) (Oral)   Resp 18   Wt 136 lb (61.7 kg)   LMP  (LMP Unknown)   SpO2 93%   BMI 26.56 kg/m²   CONSTITUTIONAL:  awake  EYES:  ENT:  normocepalic, without obvious abnormality. Scar or surgery on scalp. NECK:  supple, symmetrical, trachea midline  HEMATOLOGIC/LYMPHATICS:  no cervical lymphadenopathy and no supraclavicular lymphadenopathy  LUNGS:  No increased work of breathing, good air exchange, clear to auscultation bilaterally, no crackles or wheezing  CARDIOVASCULAR:  normal apical pulses  ABDOMEN:  Soft Non tender.   Data  Labs:  General Labs:    CBC with Differential:    Lab Results   Component Value Date    WBC 10.9 07/11/2021    RBC 3.84 07/11/2021    RBC 4.23 04/08/2019    HGB 11.3 07/11/2021    HCT 37.8 07/11/2021     07/11/2021    MCV 98.4 07/11/2021    MCH 29.4 07/11/2021    MCHC 29.9 07/11/2021    RDW 17.0 04/08/2019    NRBC 0 07/03/2021    SEGSPCT 90.9 07/03/2021    LYMPHOPCT 21.3 04/08/2019    MONOPCT 3.8 07/03/2021    MONOPCT 9.2 07/23/2018    EOSPCT 0.9 04/08/2019    BASOPCT 0.1 04/08/2019    MONOSABS 0.6 07/03/2021    LYMPHSABS 0.7 07/03/2021    EOSABS 0.0 07/03/2021    BASOSABS 0.0 07/03/2021     CMP:    Lab Results   Component Value Date     07/09/2021    K 4.8 07/09/2021     07/09/2021    CO2 30 07/09/2021    BUN 28 07/09/2021    CREATININE 0.5 07/09/2021    AGRATIO 1.7 07/23/2018    LABGLOM >90 07/09/2021    GLUCOSE 114 07/09/2021    GLUCOSE 103 07/23/2018    PROT 5.8 07/06/2021    LABALBU 4.0 07/06/2021    CALCIUM 9.3 07/09/2021    BILITOT 0.5 07/06/2021    BILITOT NEGATIVE 11/13/2018    ALKPHOS 53 07/06/2021    AST 78 07/06/2021    ALT 81 07/06/2021     BMP:    Lab Results   Component Value Date     07/09/2021    K 4.8 07/09/2021     07/09/2021    CO2 30 07/09/2021    BUN 28 07/09/2021    LABALBU 4.0 07/06/2021    CREATININE 0.5 07/09/2021    CALCIUM 9.3 07/09/2021    LABGLOM >90 07/09/2021    GLUCOSE 114 07/09/2021    GLUCOSE 103 07/23/2018     No radiation information found for this patient   Narrative   PROCEDURE: CT CHEST W CONTRAST, CT ABDOMEN PELVIS W IV CONTRAST       CLINICAL INFORMATION: possible mets .       COMPARISON: CT chest, abdomen and pelvis dated 6/28/2021.       TECHNIQUE: Helical CT of the chest, abdomen and pelvis following intravenous administration of 80 mL Isovue-370 injected in the right arm with sagittal and coronal reconstructions.        All CT scans at this facility use dose modulation, iterative reconstruction, and/or weight-based dosing when appropriate to reduce radiation dose to as low as reasonably achievable.       FINDINGS:        CHEST:   There is a small right pleural effusion which has appeared in the interval. There is minimal adjacent atelectasis. There is a 4 mm calcified nodule at the posterior aspect of left upper lung as evidence for old granulomatous disease stable dating back to    2016. There is a noncalcified nodule measuring 3 mm at the left posterior costophrenic angle, stable compared to 2016 as evidence for benignity. There is also stable 3 mm nodule at the lateral costophrenic angle at the left lung base. The lungs    otherwise appear clear. No axillary, mediastinal or hilar lymphadenopathy is identified. The heart is mildly enlarged. There is coronary artery calcification, stable compared to prior exam. There is exaggerated thoracic kyphosis. There is otherwise    anatomic vertebral body height and alignment. No suspicious osseous lesion is identified.       Abdomen/pelvis:    The liver is normal in appearance. The gallbladder is surgically absent. There is mild postsurgical ductal prominence. Adrenal glands are unremarkable. There is a prominent RCA exophytic cyst at the inferior pole of the left kidney. This is stable    compared to prior CT. Kidneys are otherwise unremarkable. There are multiple hypodense lesions in the spleen stable dating back to 2016 likely representing cysts or hemangiomas. There is a 7 mm hypodense focus in the body of the pancreas not definitely    visualized on prior exam. Pancreas is otherwise unremarkable. No retroperitoneal or mesenteric lymphadenopathy is identified.       There are scattered diverticula within the large bowel without adjacent inflammation to suggest diverticulitis. There is stool throughout the large bowel. There is a ventral hernia on the left containing loops of small bowel without evidence of    obstruction. This is similar to prior exam. The bladder is unremarkable. Uterus appears to be absent. There appears to be residual cervix which is unremarkable. No free fluid is identified. Redemonstration of old pubic ramus fractures on the right. There    are stable degenerative changes of the lumbar spine. No suspicious osseous lesion is identified. Redemonstration of bilateral pars defects at L5 with grade 2 anterolisthesis of L5 relative to S1, stable compared to prior exam.           Impression       1. No evidence of metastatic disease in the chest, abdomen and pelvis. 2. 7 mm hypodense focus in the body of the pancreas may represent IPMN. Consider follow-up MRI of the pancreas in 6 months. 3. Mild colonic diverticulosis. 4. Moderate retained stool.                   **This report has been created using voice recognition software. It may contain minor errors which are inherent in voice recognition technology. **       Final report electronically signed by Dr. Yen Funes MD on 7/8/2021 9:11 AM               Current Medications  Current Facility-Administered Medications: melatonin tablet 3 mg, 3 mg, Oral, Nightly PRN  acetaminophen (TYLENOL) tablet 650 mg, 650 mg, Oral, Q4H PRN  magnesium hydroxide (MILK OF MAGNESIA) 400 MG/5ML suspension 30 mL, 30 mL, Oral, Daily PRN  ondansetron (ZOFRAN-ODT) disintegrating tablet 4 mg, 4 mg, Oral, Q8H PRN **OR** [DISCONTINUED] ondansetron (ZOFRAN) injection 4 mg, 4 mg, Intravenous, Q6H PRN  fleet rectal enema 1 enema, 1 enema, Rectal, Daily PRN  lidocaine 4 % external patch 2 patch, 2 patch, Topical, Daily  polyethylene glycol (GLYCOLAX) packet 17 g, 17 g, Oral, Daily  ALPRAZolam (XANAX) tablet 0.25 mg, 0.25 mg, Oral, TID PRN  atorvastatin (LIPITOR) tablet 10 mg, 10 mg, Oral, Nightly  dexamethasone (DECADRON) tablet 3 mg, 3 mg, Oral, 3 times per day  dextrose 5 % solution, 100 mL/hr, Intravenous, PRN  dextrose 50 % IV solution, 12.5 g, Intravenous, PRN  docusate sodium (COLACE) capsule 100 mg, 100 mg, Oral, BID  glucagon (rDNA) injection 1 mg, 1 mg, Intramuscular, PRN  glucose (GLUTOSE) 40 % oral gel 15 g, 15 g, Oral, PRN  hydrALAZINE (APRESOLINE) tablet 25 mg, 25 mg, Oral, 3 times per day  insulin lispro (HUMALOG) injection vial 0-6 Units, 0-6 Units, Subcutaneous, TID WC  insulin lispro (HUMALOG) injection vial 0-3 Units, 0-3 Units, Subcutaneous, Nightly  isosorbide mononitrate (IMDUR) extended release tablet 30 mg, 30 mg, Oral, Daily  levETIRAcetam (KEPPRA) tablet 500 mg, 500 mg, Oral, BID  levothyroxine (SYNTHROID) tablet 100 mcg, 100 mcg, Oral, Daily  pantoprazole (PROTONIX) tablet 40 mg, 40 mg, Oral, QAM AC  sertraline (ZOLOFT) tablet 50 mg, 50 mg, Oral, BID  carbamide peroxide (DEBROX) 6.5 % otic solution 5 drop, 5 drop, Both Ears, BID    ASSESSMENT AND PLAN  Patient Active Problem List:     GERD (gastroesophageal reflux disease)     Carotid stenosis     Temporal arteritis (HCC)     Hypercholesteremia     Hypothyroidism due to acquired atrophy of thyroid     Steroid-induced hyperglycemia     Blind right eye     Current chronic use of systemic steroids     Personal history of cardiac dysrhythmia     Coronary artery disease involving native coronary artery of native heart without angina pectoris     Anxiety     H/O: CVA (cerebrovascular accident)     Lumbosacral spinal stenosis     Vitamin D deficiency     Hx of breast cancer with liver mets     Breast cancer metastasized to liver [facility])     Fall     Traumatic ecchymosis of forehead     Brain mass     Subarachnoid hemorrhage following injury, no loss of consciousness (HCC)     Traumatic ecchymosis of left lower leg     Traumatic ecchymosis of left forearm     Platelet inhibition due to Plavix     Fall at home, initial encounter     Brain edema ([address]     Intraparenchymal hemorrhage of brain ([address]     Closed head injury     Breast cancer metastasized to brain, right [facility])     Status post craniotomy     Impaired functional mobility, balance, gait, and endurance    A 1 Breast Cancer Diagnosed in 2016 with Liver mets. With Complete Remission therapy 2 years       Ago. 2 Brain met from breast primary ER/NE negative. Her 2 positive S/P resection      3 Negative CT Chest, Abdomen and Pelvis for metastatic Disease. Plan[de-identified]  Taper down Decadron to 2 mg TID. Will give Enhertu therapy once discharged. It crosses blood brain barrier. D/W the patient.           Rolf Olivera, CODYD,CWS  10:05 PM  7/11/2021

## 2021-07-12 NOTE — CARE COORDINATION
Date: 2021  Patient Name: Meliton Tello        MRN: [id number]    Account Number: [de-identified]  : 1933  (80 y.o.)  Gender: female   Referring Practitioner: Jean Paul Prater MD  Diagnosis: breast cancer metastasized to the brain, right    Meliton Tello was evaluated today and a DME order was entered for a lightweight wheelchair because she requires this to successfully complete daily living tasks of bathing, toileting, personal cares, grooming and meal preparation. A lightweight wheelchair is necessary due to the patient's impaired ambulation and mobility restrictions. The patient would not be able to resolve these daily living tasks using a cane or walker. The patient can self-propel a lightweight wheelchair safely in their home and can maneuver within their home with adequate access. The patient cannot self-propel in a standard wheelchair. The need for this equipment was discussed with the patient and she understands, is in agreement, and has not expressed an unwillingness to use the wheelchair. Meliton Tello requires a foam seat cushion for their wheelchair to provide adequate pressure relief for maintenance of proper skin integrity.

## 2021-07-12 NOTE — PROGRESS NOTES
[facility] NOTE    Conference Date: 2021  Admit Date:  2021  2:41 PM  Patient Name: Orestes Vaca    MRN: [id number]    : 1933  (80 y.o.)  Rehabilitation Admitting Diagnosis:  Breast cancer metastasized to brain, right ([facility] [address] [C50.911, C79.31]  Referring Practitioner: Opal Flores MD      CASE MANAGEMENT  Current issues/needs regarding patient and family discharge status: SW met with patient to introduce self and explain role, complete SW assessment, and initiate discharge planning. Prior to hospitalization, patient received assistance with ADL's and personal care. Patient lives alone. Patient has a brother, , that lives in [place]. Patient has a sister, [place], in [facility], but she has her own health issues (requires 24 hour care). Patient reports nieces-Jen, Davian Barrett, and Manuela-visit and provide support as needed. Patient reports niVikki keller, visit more frequently. Patient reports having emergency alert button. Patient uses walker for ambulation. Patient reports pharmacy delivers medications and nurse (Pooja MCDONALD) fills medication box. Patient does not drive. Therefore, uses COA or family for transportation needs. Patient manages own finances. Patient has non-skilled HHA ([employer] [facility]) Monday through Friday for 2 hours daily (usually from 8:00am to 10:00am). Patient reports HHA assists with bathing errands/groceries, meal preparation, housekeeping, and laundry. Patient reports goal to be able to return home with current support. Patient is agreeable to increased skilled services through home health at discharge. Anticipate patient would benefit from continued therapy upon discharge. SW reviewed with patient team conference on Tuesday, 2021, to further discuss progress and discharge recommendations. SW to follow and maintain involvement in discharge planning.     PHYSICAL THERAPY  Patient met 2/4 STG's and 0/4 LTG's. Patient with limited progress due to short length of stay so far on inpatient rehab unit, however is demonstrating small improvements with mobility. Patient progressed tinetti from 13 to 15/28, although this score continues to indicate high risk for falls, pt is gradually ambulating increased distances and progressing to 4WW. Patient requiring CGA with ambulation and transfers for safety with continued verbal cues for safe hand placement, posture and due to limited vision. Patient would benefit from continued skilled PT services to improve her ability to complete functional mobility, reduce her risk for falls and allow patient to return to PLOF. Equipment Needed: Yes (RW, wheelchair)  Other: wheelchair    SPEECH THERAPY  Patient achieved 2/5 STGs and 0/2 LTGs this progress reporting period. Patient with improved performance given functional recall tasks, presentation of functional safety tasks, and overall sustained attention, however, patient continues to present with the following identified needs: functional carryover with independent initiation of compensatory strategies, impaired verbal reasoning, mental flexibility, and poor alternating, divided, and selective attention. Patient currently safely consuming regular diet with thin liquids with NO overt difficulty identified at this time. Recommend continuation of dysphagia management at this time to allow for continued education/implementation of compensatory swallowing strategies and ensured safety/tolerance of diet recommendation. Despite low cognitive demands present in home environment with excellent family support, patient would benefit from continued skilled ST services to address cognitive deficits and allow for safe return to ADLs and IADLs with improved independence and promote cognitive-linguistic skills to a supervision level of assist for safe, functional return to home setting post discharge.     OCCUPATIONAL THERAPY  Pt is making good progress towards goals. Patient has met 1/4 goals. Able to complete LB dressing with SBA. Pt continues to require skilled Occupational Therapy to address the following performance deficits balance, endurance, safety awareness, and activity tolerance. Without skilled OT intervention pt is at risk for functional decline, increased falls, and readmission to hospital.  Other: 4 wheeled walker    RECREATIONAL THERAPY  Patient has been offered participation in recreational therapy activities and participates as able. Pt has a personal care attendant that assists her at home-Pt listens to music, she use to enjoy her house work and taking care of the lawn, she enjoys watching [employer] news on tv-very pleasant and social-attends [employer]-her brother was visiting from [place] today and she is going to miss him when he goes home this weekend -Upon arrival, pt was sitting in her bed eating her breakfast- pt was very social and talked about visiting with her brother and nephew this weekend, how she wants to visit her grandson that lives in [place], how her only child  of cancer, where she worked, and that she is hoping to go home tomorrow- pt did not have any interest in leisure materials at this time since she has poor vision due to having a glass eye- pt had a bright affect through smiling- pt was very appreciative of visit from student and told the student to come back anytime to visit     NUTRITION  Weight: 135 lb 8 oz (61.5 kg) / Body mass index is 26.46 kg/m². Current diet: ADULT DIET; Regular  Adult Oral Nutrition Supplement; Standard High Calorie/High Protein Oral Supplement  Please see nutrition note for details. NURSING  Continent of Bowel: Yes. Frequency: daily. Management: Docusate. Continent of Bladder: Yes. Frequency: 4-6 times daily. Management: none. Pain is Managed:  Yes. Management: none. Frequency of Intervention: n/a.   Adequately Controlled: Yes  Sleep: Adequate  Melatonin as needed for sleep  Signs and Symptoms of Infection:  No.   Signs and Symptoms of Skin Breakdown:  No.   Injury and Fall Free during Inpatient Rehabilitation Admission: Yes  Anticoagulants: None  Diabetic: No  Consultations/Labs/X-rays: None    Recent Labs     07/12/21  1657 07/12/21  2049 07/13/21  0739   POCGLU 130* 93 97       Lab Results   Component Value Date    LDLCALC 59 08/21/2020         Vitals:    07/12/21 2046 07/13/21 0455 07/13/21 0515 07/13/21 0745   BP: 135/60 130/60  138/64   Pulse: 72 60  59   Resp: 16   16   Temp: 98.1 °F (36.7 °C)   97.7 °F (36.5 °C)   TempSrc: Oral   Oral   SpO2: 94%   96%   Weight:   135 lb 8 oz (61.5 kg)    Height:              Family Education: Family available and participating in education   Fall Risk:  Falling star program initiated  Is the patient appropriate for a stay in the functional apartment? no    Discharge Plan   Estimated Discharge Date: Wednesday, 7/21/2021   Destination: discharge home with supervision  Services at Discharge: [address], Occupational Therapy, Speech Therapy, Nursing and HHA 3x week  Is patient appropriate for an outpatient driving evaluation? no  Equipment at Discharge: Other: 4 wheeled walker  Other: wheelchair  Factors facilitating achievement of predicted outcomes: Family support, Motivated, Cooperative and Pleasant  Barriers to the achievement of predicted outcomes: Confusion, Cognitive deficit, Limited safety awareness, Limited insight into deficits, Decreased endurance and Long standing deficits    Team Members Present at Conference:  [facility] [address], [address], OTR/L [facility]  Physical Therapist:Sabrina Dee, [facility]  Speech Di Meet MWilderSWilder [address]   Tess Barros, YADY  Psychologist: Brandon Garza, PhD.    I approve the established interdisciplinary plan of care as documented within the medical record of [address] [place] [address].     Camelia Gonzales MD

## 2021-07-12 NOTE — PLAN OF CARE
Problem: Nutrition  Goal: Optimal nutrition therapy  Outcome: Ongoing   Nutrition Problem #1: Increased nutrient needs  Intervention: Food and/or Nutrient Delivery: Continue Current Diet, Continue Oral Nutrition Supplement, Vitamin Supplement  Nutritional Goals: Patient will consume 75% or more of meals to aid in healing during LOS.

## 2021-07-12 NOTE — CONSULTS
Comprehensive Nutrition Assessment    Type and Reason for Visit:  Initial, Consult (New Rehab Admit/Oral Nutrition Supplements)    Nutrition Recommendations/Plan:   *Recommend a Multivitamin w/minerals daily. *Continue current diet and Ensure Enlive TID. Nutrition Assessment:  Pt. nutritionally compromised AEB s/p craniotomy and wounds. At risk for further nutrition compromise r/t increased nutrient needs for wound healing, admit with fall at home, subarachnoid hemorrhage, right brain mass- metastatic from breast cancer, underlying medical condition (HTN, HLD, GERD). Nutrition recommendations/interventions as per above.     Malnutrition Assessment:  Malnutrition Status: At risk for malnutrition (Comment)    Context:  Acute Illness     Findings of the 6 clinical characteristics of malnutrition:  Energy Intake:  No significant decrease in energy intake  Weight Loss:  No significant weight loss     Body Fat Loss:  No significant body fat loss     Muscle Mass Loss:  1 - Mild muscle mass loss Temples (temporalis), Clavicles (pectoralis & deltoids)  Fluid Accumulation:  Unable to assess Extremities   Strength:  Not Performed    Estimated Daily Nutrient Needs:  Energy (kcal):  7171-2837 kcal/day (20-25 kcal/kg); Weight Used for Energy Requirements:  Current (61.2 kg)     Protein (g):  63+ g/day (1.4+ g/kg); Weight Used for Protein Requirements:  Ideal (45 kg)          Nutrition Related Findings: pt seen; she reports good appetite and intake of meals over the past week consuming % of most meals and x2 Ensure Enlive daily; pt denies N/V or chewing/swallowing difficulty with food; last BM x1 on x3 on 7/10. Rx includes: Lipitor, Decadron, Colace, Humalog, Glycolax and Zofran PRN. Wounds:  Surgical Incision (7/2/21: craniotomy and resection of intracranial mass; right face incision, left arm skin tear, left arm wound, right buttocks stage I)       Current Nutrition Therapies:    ADULT DIET;  Regular  Adult Oral Nutrition Supplement; Standard High Calorie/High Protein Oral Supplement    Anthropometric Measures:  · Height: 5' (152.4 cm)  · Current Body Weight: 134 lb 14.4 oz (61.2 kg) (7/12; no edema noted)   · Admission Body Weight: 133 lb 11.2 oz (60.6 kg) (7/8; no edema noted)    · Usual Body Weight:  (per EMR: 8/12/20 131# 9oz, 3/9/21 128# 8oz, 6/15/21 137# 4oz)     · Ideal Body Weight: 100 lbs  · BMI: 26.3  · BMI Categories: Overweight (BMI 25.0-29. 9)       Nutrition Diagnosis:   · Increased nutrient needs related to increase demand for energy/nutrients as evidenced by wounds    Nutrition Interventions:   Food and/or Nutrient Delivery:  Continue Current Diet, Continue Oral Nutrition Supplement, Vitamin Supplement  Nutrition Education/Counseling:  Education initiated (encouraged intake at best effort, use of ONS)   Coordination of Nutrition Care:  Continue to monitor while inpatient    Goals:  Patient will consume 75% or more of meals to aid in healing during LOS. Nutrition Monitoring and Evaluation:   Behavioral-Environmental Outcomes:  None Identified   Food/Nutrient Intake Outcomes:  Food and Nutrient Intake, Supplement Intake, Vitamin/Mineral Intake  Physical Signs/Symptoms Outcomes:  Biochemical Data, GI Status, Weight, Skin, Nutrition Focused Physical Findings, Fluid Status or Edema     Discharge Planning:     Too soon to determine     Electronically signed by Torrie Lowe MS, RD, LD on 7/12/21 at 11:15 AM EDT    Contact: [phone]

## 2021-07-13 LAB
BACTERIA: ABNORMAL /HPF
BILIRUBIN URINE: NEGATIVE
BLOOD, URINE: NEGATIVE
CASTS 2: ABNORMAL /LPF
CASTS UA: ABNORMAL /LPF
CHARACTER, URINE: CLEAR
COLOR: YELLOW
CRYSTALS, UA: ABNORMAL
EPITHELIAL CELLS, UA: ABNORMAL /HPF
ERYTHROCYTE [DISTWIDTH] IN BLOOD BY AUTOMATED COUNT: 14.5 % (ref 11.5–14.5)
ERYTHROCYTE [DISTWIDTH] IN BLOOD BY AUTOMATED COUNT: 52.4 FL (ref 35–45)
GLUCOSE BLD-MCNC: 121 MG/DL (ref 70–108)
GLUCOSE BLD-MCNC: 151 MG/DL (ref 70–108)
GLUCOSE BLD-MCNC: 90 MG/DL (ref 70–108)
GLUCOSE BLD-MCNC: 97 MG/DL (ref 70–108)
GLUCOSE URINE: NEGATIVE MG/DL
HCT VFR BLD CALC: 39.6 % (ref 37–47)
HEMOGLOBIN: 12 GM/DL (ref 12–16)
KETONES, URINE: NEGATIVE
LEUKOCYTE ESTERASE, URINE: ABNORMAL
MCH RBC QN AUTO: 30.1 PG (ref 26–33)
MCHC RBC AUTO-ENTMCNC: 30.3 GM/DL (ref 32.2–35.5)
MCV RBC AUTO: 99.2 FL (ref 81–99)
MISCELLANEOUS 2: ABNORMAL
NITRITE, URINE: POSITIVE
PH UA: 5.5 (ref 5–9)
PLATELET # BLD: 160 THOU/MM3 (ref 130–400)
PMV BLD AUTO: 12.1 FL (ref 9.4–12.4)
PROTEIN UA: NEGATIVE
RBC # BLD: 3.99 MILL/MM3 (ref 4.2–5.4)
RBC URINE: ABNORMAL /HPF
RENAL EPITHELIAL, UA: ABNORMAL
SPECIFIC GRAVITY, URINE: 1.01 (ref 1–1.03)
UROBILINOGEN, URINE: 0.2 EU/DL (ref 0–1)
WBC # BLD: 13.8 THOU/MM3 (ref 4.8–10.8)
WBC UA: ABNORMAL /HPF
YEAST: ABNORMAL

## 2021-07-13 PROCEDURE — 97530 THERAPEUTIC ACTIVITIES: CPT

## 2021-07-13 PROCEDURE — 97130 THER IVNTJ EA ADDL 15 MIN: CPT

## 2021-07-13 PROCEDURE — 6360000002 HC RX W HCPCS: Performed by: INTERNAL MEDICINE

## 2021-07-13 PROCEDURE — 97129 THER IVNTJ 1ST 15 MIN: CPT

## 2021-07-13 PROCEDURE — 36415 COLL VENOUS BLD VENIPUNCTURE: CPT

## 2021-07-13 PROCEDURE — 97116 GAIT TRAINING THERAPY: CPT

## 2021-07-13 PROCEDURE — 82948 REAGENT STRIP/BLOOD GLUCOSE: CPT

## 2021-07-13 PROCEDURE — 85027 COMPLETE CBC AUTOMATED: CPT

## 2021-07-13 PROCEDURE — 81001 URINALYSIS AUTO W/SCOPE: CPT

## 2021-07-13 PROCEDURE — 97535 SELF CARE MNGMENT TRAINING: CPT

## 2021-07-13 PROCEDURE — 97110 THERAPEUTIC EXERCISES: CPT

## 2021-07-13 PROCEDURE — 1180000000 HC REHAB R&B

## 2021-07-13 PROCEDURE — 99233 SBSQ HOSP IP/OBS HIGH 50: CPT | Performed by: PHYSICAL MEDICINE & REHABILITATION

## 2021-07-13 PROCEDURE — 6370000000 HC RX 637 (ALT 250 FOR IP): Performed by: EMERGENCY MEDICINE

## 2021-07-13 PROCEDURE — 6370000000 HC RX 637 (ALT 250 FOR IP): Performed by: NURSE PRACTITIONER

## 2021-07-13 RX ORDER — NITROFURANTOIN 25; 75 MG/1; MG/1
100 CAPSULE ORAL EVERY 12 HOURS SCHEDULED
Status: COMPLETED | OUTPATIENT
Start: 2021-07-13 | End: 2021-07-21

## 2021-07-13 RX ADMIN — INSULIN LISPRO 1 UNITS: 100 INJECTION, SOLUTION INTRAVENOUS; SUBCUTANEOUS at 17:26

## 2021-07-13 RX ADMIN — HYDRALAZINE HYDROCHLORIDE 25 MG: 25 TABLET ORAL at 21:44

## 2021-07-13 RX ADMIN — SERTRALINE 50 MG: 50 TABLET, FILM COATED ORAL at 09:50

## 2021-07-13 RX ADMIN — DOCUSATE SODIUM 100 MG: 100 CAPSULE ORAL at 09:50

## 2021-07-13 RX ADMIN — POLYETHYLENE GLYCOL (3350) 17 G: 17 POWDER, FOR SOLUTION ORAL at 09:50

## 2021-07-13 RX ADMIN — SERTRALINE 50 MG: 50 TABLET, FILM COATED ORAL at 21:44

## 2021-07-13 RX ADMIN — DEXAMETHASONE 2 MG: 4 TABLET ORAL at 21:44

## 2021-07-13 RX ADMIN — LEVOTHYROXINE SODIUM 100 MCG: 0.1 TABLET ORAL at 04:57

## 2021-07-13 RX ADMIN — HYDRALAZINE HYDROCHLORIDE 25 MG: 25 TABLET ORAL at 04:57

## 2021-07-13 RX ADMIN — PANTOPRAZOLE SODIUM 40 MG: 40 TABLET, DELAYED RELEASE ORAL at 04:57

## 2021-07-13 RX ADMIN — ATORVASTATIN CALCIUM 10 MG: 10 TABLET, FILM COATED ORAL at 21:43

## 2021-07-13 RX ADMIN — Medication 3 MG: at 21:44

## 2021-07-13 RX ADMIN — NITROFURANTOIN MONOHYDRATE/MACROCRYSTALLINE 100 MG: 25; 75 CAPSULE ORAL at 21:43

## 2021-07-13 RX ADMIN — DEXAMETHASONE 2 MG: 4 TABLET ORAL at 14:21

## 2021-07-13 RX ADMIN — LEVETIRACETAM 500 MG: 500 TABLET, FILM COATED ORAL at 09:50

## 2021-07-13 RX ADMIN — HYDRALAZINE HYDROCHLORIDE 25 MG: 25 TABLET ORAL at 14:24

## 2021-07-13 RX ADMIN — LEVETIRACETAM 500 MG: 500 TABLET, FILM COATED ORAL at 21:44

## 2021-07-13 RX ADMIN — DOCUSATE SODIUM 100 MG: 100 CAPSULE ORAL at 21:44

## 2021-07-13 RX ADMIN — DEXAMETHASONE 2 MG: 4 TABLET ORAL at 04:57

## 2021-07-13 RX ADMIN — ISOSORBIDE MONONITRATE 30 MG: 30 TABLET ORAL at 09:50

## 2021-07-13 ASSESSMENT — PAIN SCALES - GENERAL
PAINLEVEL_OUTOF10: 0
PAINLEVEL_OUTOF10: 0

## 2021-07-13 NOTE — PROGRESS NOTES
Family Medicine Progress Notes/Coverage    Today's Date: 7/13/21  7E-64/064-A  Medical Record # [id number]  CSN/Account # [de-identified]      Ms. Rod admitted on 7/8/2021        Subjective / Interval History :     dysuria  Reviewed notes, consults, laboratory and radiology results,    Objective:       Physical Exam:  Patient Vitals for the past 24 hrs:   BP Temp Temp src Pulse Resp SpO2 Weight   07/13/21 0745 138/64 97.7 °F (36.5 °C) Oral 59 16 96 % --   07/13/21 0515 -- -- -- -- -- -- 135 lb 8 oz (61.5 kg)   07/13/21 0455 130/60 -- -- 60 -- -- --   07/12/21 2046 135/60 98.1 °F (36.7 °C) Oral 72 16 94 % --   07/12/21 1435 (!) 121/52 98.1 °F (36.7 °C) Oral 72 16 98 % --   07/12/21 0912 135/62 96.1 °F (35.6 °C) Oral 70 16 -- --       General Appearance:  Alert, cooperative, no distress, appears stated age   HEENT:  Neck:      Chest/Lungs:  Heart:  Clear to auscultation  Regular rate and rhythm   Abdomen:  Back:  Soft nontender  No CVA tenderness   Extremities:  Neurological Exam:  No edema       Assessment:     Admitting Diagnosis:    Breast cancer metastasized to brain, right ([facility] [C50.911, C79.31]    Active Hospital Problems    Diagnosis Date Noted    Impaired functional mobility, balance, gait, and endurance [Z74.09] 07/08/2021     Priority: High    Breast cancer metastasized to brain, right ([address] [C50.911, C79.31] 07/06/2021     Priority: High    Status post craniotomy [Z98.890] 07/02/2021     Priority: High    Subarachnoid hemorrhage following injury, no loss of consciousness ([facility] [S06.6X0A] 06/28/2021     Priority: High    Fall [W19. XXXA] 06/28/2021     Priority: High    Fall at home, initial encounter [A49. Nasra Magdaleno, X43.381] 06/28/2021     Priority: Medium    Current chronic use of systemic steroids [Z79.52] 09/04/2018     Priority: Medium    Coronary artery disease Component Value Date    LIPASE 12.6 08/12/2020         Electronically signed by Wallace France MD on 7/13/21 at 7:59 AM AMELIA Siddiqi M.D.

## 2021-07-13 NOTE — PROGRESS NOTES
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

## 2021-07-13 NOTE — PROGRESS NOTES
Family Medicine Progress Notes/Coverage    Today's Date: 7/12/21  7E-64/064-A  Medical Record # [id number]  CSN/Account # [de-identified]      Ms. Rod admitted on 7/8/2021        Subjective / Interval History :     Feeling well  No HA, no dizziness  Eating well  Reviewed notes, consults, laboratory and radiology results,    Objective:       Physical Exam:  Patient Vitals for the past 24 hrs:   BP Temp Temp src Pulse Resp SpO2 Height Weight   07/12/21 2046 135/60 98.1 °F (36.7 °C) Oral 72 16 94 % -- --   07/12/21 1435 (!) 121/52 98.1 °F (36.7 °C) Oral 72 16 98 % -- --   07/12/21 0912 135/62 96.1 °F (35.6 °C) Oral 70 16 -- -- --   07/12/21 0603 (!) 149/65 -- -- 65 18 -- -- --   07/12/21 0510 -- -- -- -- -- -- -- 134 lb 14.4 oz (61.2 kg)   07/11/21 2345 -- -- -- -- -- -- 5' (1.524 m) 136 lb (61.7 kg)       General Appearance:  Alert, cooperative, no distress, appears stated age   HEENT:  Neck: Jessica right parietal area w/o infection  Supple   Chest/Lungs:  Heart: CTA  RRR   Abdomen:  Back: soft   Extremities:  Neurological Exam: No edema  WNL       Assessment:     Admitting Diagnosis:    Breast cancer metastasized to brain, right ([address] [C50.911, C79.31]    Active Hospital Problems    Diagnosis Date Noted    Impaired functional mobility, balance, gait, and endurance [Z74.09] 07/08/2021     Priority: High    Breast cancer metastasized to brain, right ([address] [C50.911, C79.31] 07/06/2021     Priority: High    Status post craniotomy [Z98.890] 07/02/2021     Priority: High    Subarachnoid hemorrhage following injury, no loss of consciousness ([address] [S06.6X0A] 06/28/2021     Priority: High    Fall [W19. XXXA] 06/28/2021     Priority: High    Fall at home, initial encounter [W74. Hay Sheridan, K31.602] 06/28/2021     Priority: Medium    Current chronic use of systemic steroids [Z79.52] 09/04/2018     Priority: Medium    Coronary artery disease involving native coronary artery of native heart without angina pectoris [I25.10] 11/06/2017     Priority: Medium    Anxiety [F41.9] 06/29/2016     Priority: Medium    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]      Priority: Medium    Hypothyroidism due to acquired atrophy of thyroid [E03.4] 03/03/2016     Priority: Medium    Temporal arteritis ([address] [M31.6]      Priority: Medium    GERD (gastroesophageal reflux disease) [K21.9]      Priority: Medium       Plan:     Discussed plans with the nursing staff  Continue rehab    Medications, Laboratories and Imaging results:    Scheduled Meds:   dexamethasone  2 mg Oral 3 times per day    lidocaine  2 patch Topical Daily    polyethylene glycol  17 g Oral Daily    atorvastatin  10 mg Oral Nightly    docusate sodium  100 mg Oral BID    hydrALAZINE  25 mg Oral 3 times per day    insulin lispro  0-6 Units Subcutaneous TID WC    insulin lispro  0-3 Units Subcutaneous Nightly    isosorbide mononitrate  30 mg Oral Daily    levETIRAcetam  500 mg Oral BID    levothyroxine  100 mcg Oral Daily    pantoprazole  40 mg Oral QAM AC    sertraline  50 mg Oral BID    carbamide peroxide  5 drop Both Ears BID     Continuous Infusions:   dextrose       PRN Meds:melatonin, acetaminophen, magnesium hydroxide, ondansetron **OR** [DISCONTINUED] ondansetron, fleet, ALPRAZolam, dextrose, dextrose, glucagon (rDNA), glucose    Imaging:    Lab Review :    Lab Results   Component Value Date    WBC 10.6 07/12/2021    HGB 11.2 (L) 07/12/2021    HCT 37.5 07/12/2021    MCV 99.7 (H) 07/12/2021     07/12/2021     Lab Results   Component Value Date    CREATININE 0.5 07/09/2021    BUN 28 (H) 07/09/2021     07/09/2021    K 4.8 07/09/2021     07/09/2021    CO2 30 07/09/2021     Lab Results   Component Value Date    CKTOTAL 70 06/28/2021     Lab Results   Component Value Date    ALT 81 (H) 07/06/2021    AST 78 (H) 07/06/2021 ALKPHOS 53 07/06/2021    BILITOT 0.5 07/06/2021     Lab Results   Component Value Date    LIPASE 12.6 08/12/2020         Electronically signed by Mark Carballo MD on 7/12/21 at 9:36 PM AMELIA Saenz M.D.

## 2021-07-13 NOTE — PROGRESS NOTES
[address] [place] [address]  INPATIENT PHYSICAL THERAPY  DAILY NOTE  [facility] - 7E-64/064-A    Time In: 1430  Time Out: 1500  Timed Code Treatment Minutes: 30 Minutes  Minutes: 30          Date: 2021  Patient Name: Samm Lopez,  Gender:  female        MRN: [id number]  : 1933  (80 y.o.)  Referral Date : 21  Referring Practitioner: Vikki Huston MD  Diagnosis: Breast Cancer Metastisized to brain, right  Additional Pertinent Hx: Per H&P:Latanya Rod is a 80 y.o. female admitted to [address] [place] [address] on 2021. 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 ([address] Carotid stenosis, Colostomy in place [facility]), Degenerative arthritis of cervical spine, GERD (gastroesophageal reflux disease), Hypercholesteremia, Hypoestrogenism, Hypothyroidism, Lumbago, Lumbosacral spinal stenosis, MDRO (multiple drug resistant organisms) resistance, Personal history of cardiac dysrhythmia, Sigmoid diverticulosis, Spondylolisthesis of lumbosacral region, Steroid-induced hyperglycemia, Subclavian artery stenosis ([address] Superior and Inferior Pubic ramus fracture, right, closed, initial encounter ([address] SVT (supraventricular tachycardia) ([address] Temporal arteritis ([address] Vertebral artery occlusion, and Vitamin D deficiency. Patient presented to [facility] ER after suffering a fall at home. Patient was attempting to ambulate when her legs gave out and she hit her nightstand. No LOC. She was too weak to get up. Her [facility] aide arrived and found her on the floor and called EMS. Patient underwent CT head and found to have right side brain mass as well as small subarachnoid hemorrhage. She is s/p debulking of the brain mass per Dr. Arlette Barthel 2021; Biopsy was c/w metastatic disease from her breast cancer.      Prior Level of Function:  Lives With: Alone  Type of Home: House  Home Layout: One level  Home Access: Level entry  [address]: 4 wheeled walker, [employer], [address] chair, [address] [facility] bed   Bathroom Shower/Tub: Tub/Shower unit, Shower chair with back  Bathroom Toilet: Standard  Bathroom Equipment: Shower chair, 3-in-1 commode    Receives Help From: Personal care attendant (Monday-Friday 2 hours)  ADL Assistance: Needs assistance  Homemaking Assistance: Needs assistance  Homemaking Responsibilities: Yes  Ambulation Assistance: Independent  Transfer Assistance: Independent  Active : No  Additional Comments: 2 hours/day DIVINA has an aide that assists with ADLs/IADLs. nieces live nearby and able to help at times. Restrictions/Precautions:  Restrictions/Precautions: General Precautions, Fall Risk  Position Activity Restriction  Other position/activity restrictions: L neglect, R artificial eye     SUBJECTIVE: Pt in chair upon arrival. Pt pleasant and stated feeling better this afternoon. Pt requests bathroom use and comlpeting daily tasks taking time. Pt returned to bed with all needs within reach    PAIN: 0/10:     Vitals: Vitals not assessed per clinical judgement, see nursing flowsheet    OBJECTIVE:  Bed Mobility:  Sit to Supine: Stand By Assistance     Transfers:  Sit to Stand: Contact Guard Assistance  Stand to [place] [id number]  Pt impulsive at times and required cues to slow down     Ambulation:  Contact Guard Assistance  Distance: 150'  Surface: Level Tile  Device:4 Wheeled Walker  Gait Deviations: Forward Flexed Posture, Slow Gillian, Decreased Step Length Bilaterally, Decreased Gait Speed, Decreased Heel Strike Bilaterally and Mild Path Deviations  Cues for proximity to walker and focusing. Pt has path deviations when distracted     Balance:  Dynamic Sitting Balance: Stand By Assistance. Pt sat on toilet and completed pericare with no UE support and no LOB. Cues for positioning   Dynamic Standing Balance: Contact Guard Assistance.  Pt stood with 0-1 UE support while completed clothing management. Oral care, and hand washing. Pt had 1 fwd LOB with clothing management with CGA to correct. Cues for posture and walker postioning    Exercise:  Patient was guided in 1 set(s) 10 reps of exercise to both lower extremities. Standing heel/toe raises, Standing marches, Standing hamstring curls and Mini squats. Exercises were completed for increased independence with functional mobility. Functional Outcome Measures: Not completed       ASSESSMENT:  Assessment: Patient progressing toward established goals. Activity Tolerance:  Patient tolerance of  treatment: good. Pt tolerated session well. Decreased attention, endurance, strength, balance. Pt distracts easily requiring cues for focus      Equipment Recommendations:Equipment Needed: Yes  Other: Pt may need a w/c. Has [employer], lift chair and hospital bed  Discharge Recommendations:  Continue to assess pending progress    Plan: Times per week: 5x/wk 90min, 1x/wk 30min  Times per day: Daily  Current Treatment Recommendations: Strengthening, Transfer Training, Endurance Training, Neuromuscular Re-education, Patient/Caregiver Education & Training, Balance Training, Gait Training, Wheelchair Mobility Training, Home Exercise Program, Functional Mobility Training, Stair training, Safety Education & Training    Patient Education  Patient Education: Plan of Care, [employer] Mobility, Transfers, Gait, Use of Gait [place], Verbal Exercise Instruction    Goals:  Patient goals : go home  Short term goals  Time Frame for Short term goals: 1 week  Short term goal 1: Patient will complete supine < > sit with modified independence to transfer in/out of bed safely. GOAL MET  Short term goal 2: Patient will complete sit < > stand with SBA and verbal cues to less than or equal to 50% of the time to stand to ambulate with decreased difficulty. Short term goal 3: Patient will ambulate 76' with a RW and CGA to progress towards ambulating household distances safely.  GOAL MET, SEE LTG  Short term goal 4: Patient will propel wheelchair 100' with SBA to increase independence and mobility. Long term goals  Time Frame for Long term goals : 4 weeks  Long term goal 1: Patient will complete sit < > stand and stand pivot transfers with modified independence to transfer surface to surface safely. Long term goal 2: Patient will ambulate 80' with a RW and supervision to navigate home with decreased difficulty. Long term goal 3: Patient will improve tinetti to greater than or equal to 19/28 to reduce her risk for falls. Long term goal 4: Patient will ascend/descend 1 step with a RW and CGA for community entry. Long term goal 5: Patient will propel wheelchair 100' with modified independence to navigate living envionment/community safely    Following session, patient left in safe position with all fall risk precautions in place.

## 2021-07-13 NOTE — PROGRESS NOTES
Physical Medicine & Rehabilitation   Progress Note    Chief Complaint:  Brain mass. Rehab needs    Subjective:  Patient seen on rounds in her room with ZIA Hernandez LSW, following patient's rehab team conference. Told patient about team's recommendation for discharge to home on 7/21/21; she asked if she could go earlier if she had a family member to stay with her for a few days after discharge. Jennie to discuss with family. Jo Ann Carson from OT was also in room working on dressing with patient. Patient still has some concerns about her dog, who is 6 and needs to be put down. Patient states it has just been the fanatix for 11 years\". Her dog is all she has at home since the deaths of her  and son. Will follow.       Rehabilitation:  PT: Reviewed during team conference  OT: Reviewed during team conference  ST: Reviewed during team conference        Review of Systems:  CONSTITUTIONAL:  positive for  fatigue  EYES: blind right eye, cataracts left  HEENT:  negative  RESPIRATORY:  negative  CARDIOVASCULAR:  negative  GASTROINTESTINAL:  Negative  GENITOURINARY:  garcia  SKIN:  Bruising, right facial  HEMATOLOGIC/LYMPHATIC:  positive for swelling/edema  MUSCULOSKELETAL:  positive for  muscle weakness  NEUROLOGICAL:  positive for gait problems and weakness  BEHAVIOR/PSYCH:  negative  System review otherwise negative      Objective:  Vitals:    07/13/21 1424   BP: (!) 147/51   Pulse:    Resp:    Temp:    SpO2:    awake  Orientation:   person, place, time  Mood: within normal limits  Affect: calm  General appearance: Patient is well nourished, well developed, well groomed and in no acute distress     Memory:   normal,   Attention/Concentration: normal  Language:  normal     Cranial Nerves:  cranial nerves II-XII are grossly intact except chronic vision issues with right eye blindness and left eye cataracts  ROM:  normal  Motor Exam:  Motor exam is symmetrical 4+ out of 5 all extremities bilaterally  Tone: normal  Muscle bulk: within normal limits  Sensory:  Sensory intact     Skin: warm and dry, no rash or erythema  Peripheral vascular: Pulses: Normal upper and lower extremity pulses; Edema: trace    Diagnostics:   Recent Results (from the past 24 hour(s))   POCT glucose    Collection Time: 07/12/21  4:57 PM   Result Value Ref Range    POC Glucose 130 (H) 70 - 108 mg/dl   POCT glucose    Collection Time: 07/12/21  8:49 PM   Result Value Ref Range    POC Glucose 93 70 - 108 mg/dl   POCT glucose    Collection Time: 07/13/21  7:39 AM   Result Value Ref Range    POC Glucose 97 70 - 108 mg/dl   CBC    Collection Time: 07/13/21  7:40 AM   Result Value Ref Range    WBC 13.8 (H) 4.8 - 10.8 thou/mm3    RBC 3.99 (L) 4.20 - 5.40 mill/mm3    Hemoglobin 12.0 12.0 - 16.0 gm/dl    Hematocrit 39.6 37.0 - 47.0 %    MCV 99.2 (H) 81.0 - 99.0 fL    MCH 30.1 26.0 - 33.0 pg    MCHC 30.3 (L) 32.2 - 35.5 gm/dl    RDW-CV 14.5 11.5 - 14.5 %    RDW-SD 52.4 (H) 35.0 - 45.0 fL    Platelets 638 551 - 957 thou/mm3    MPV 12.1 9.4 - 12.4 fL   POCT glucose    Collection Time: 07/13/21 12:07 PM   Result Value Ref Range    POC Glucose 90 70 - 108 mg/dl   Urine with Reflexed Micro    Collection Time: 07/13/21  2:48 PM   Result Value Ref Range    Glucose, Ur NEGATIVE NEGATIVE mg/dl    Bilirubin Urine NEGATIVE NEGATIVE    Ketones, Urine NEGATIVE NEGATIVE    Specific Gravity, Urine 1.011 1.002 - 1.030    Blood, Urine NEGATIVE NEGATIVE    pH, UA 5.5 5.0 - 9.0    Protein, UA NEGATIVE NEGATIVE    Urobilinogen, Urine 0.2 0.0 - 1.0 eu/dl    Nitrite, Urine POSITIVE (A) NEGATIVE    Leukocyte Esterase, Urine TRACE (A) NEGATIVE    Color, UA YELLOW STRAW-YELLOW    Character, Urine CLEAR CLEAR-SL CLOUD    RBC, UA NONE SEEN 0-2/hpf /hpf    WBC, UA 5-9 0-4/hpf /hpf    Epithelial Cells, UA 0-2 3-5/hpf /hpf    Bacteria, UA MODERATE FEW/NONE SEEN /hpf    Casts UA NONE SEEN NONE SEEN /lpf    Crystals, UA NONE SEEN NONE SEEN    Renal Epithelial, UA NONE SEEN NONE SEEN Yeast, UA NONE SEEN NONE SEEN    CASTS 2 NONE SEEN NONE SEEN /lpf    MISCELLANEOUS 2 NONE SEEN        Impression:  · Right brain mass - metastatic from breast  · Fall  · CHI with facial bruising  · Subarachnoid and parenchymal hemorrhage  · Traumatic ecchymosis to left lower leg and left forearm, right forehead ecchymosis  · Bilateral lower extremity weakness  · Hypotension  · Anxiety  · GERD  · SVT  · HLD  · Hyperglycemia, steroid induced  · Hx Breast cancer with mets to the liver and brain    Plan:  Continue current therapies  Prophylaxis: DVT - SCDs/BARRY due to brain bleed, GI - Protonix  Pain: tylenol, lidoderm,   Bowels: Colace, fleets, MOM, miralax. +BM 7/10/21  Seizure: keppra  Anxiety: xanax, zoloft  Melatonin at night PRN  Ok for OTC spray for leg pain at night  Oncology decreasing decadron and plans [facility] therapy once discharged  Patient asking about w/c, states passport was working on getting her one.    · Discharge planning as above      Missed Therapy Time:  · None    Eloisa Herzog MD

## 2021-07-13 NOTE — PROGRESS NOTES
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 female who presents to the emergency department for evaluation of fall. Patient has had multiple falls over the past couple weeks, refused EMS transfer each time. Today patient had a fall, striking her right temple. Patient is on Plavix. 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 Only pain that she is complaining of is to her right temple. Per MRI: Heterogeneous 5.5 cm nodular peripherally enhancing cystic and solid mass centered at the right occipital/temporal lobe junction with prominent adjacent T2/FLAIR prolongation involving the corpus callosum, temporal, parietal and frontal lobes with prominent mass effect on the right lateral ventricle and 9 mm leftward midline shift. (High grade glioma, glioblastoma multiform). Pt is S/P R craniotomy on 21.  Pt admitted to inpatient rehab unit on 2021    Restrictions/Precautions:  Restrictions/Precautions: General Precautions, Fall Risk  Position Activity Restriction  Other position/activity restrictions: L neglect, R artificial eye     SUBJECTIVE: Up in w/c upon arrival, agreeable to OT session, cooperative    PAIN: 0/10:     Vitals: Vitals not assessed per clinical judgement, see nursing flowsheet    COGNITION: Decreased Insight, Impaired Memory, Decreased Problem Solving and Decreased Safety Awareness    ADL:   Upper Extremity Dressing: with set-up.  donned sitting in w/c, A for clothing orientation due to low vision  Lower Extremity Dressing: Stand By Assistance. donned sititng in w/c, increased time. BED MOBILITY:  Not Tested    TRANSFERS:  Sit to Stand:  Stand By Assistance. w/c and bedside chair  Stand to Sit: Stand By Assistance. FUNCTIONAL MOBILITY:  Assistive Device: 4 Wheeled Walker  Assist Level:  Stand By Assistance. Distance: w/c to bedside chair approx 5 feet     ADDITIONAL ACTIVITIES:  Guided patient through BUE AROM this date x10 reps x1 set in chair in order to increase BUE strength and improve activity tolerance for ADLs and homemaking tasks. A for using phone, unable to locate buttons to make phone call due to vision issuesd    ASSESSMENT:     Activity Tolerance:  Patient tolerance of  treatment: good. Discharge Recommendations: Patient would benefit from continued therapy after discharge, Continue to assess pending progress, Home with Home health OT   Equipment Recommendations: Other: 4 wheeled walker  Plan: Times per week: 5x/wk for 90 min & 1x/wk for 30 min  Current Treatment Recommendations: Strengthening, Balance Training, Neuromuscular Re-education, Functional Mobility Training, Endurance Training, Home Management Training, Patient/Caregiver Education & Training, Self-Care / ADL, Safety Education & Training  Plan Comment: Pt demonstrates decline from baseline, requiring increased assistance due to decreased endurance and vision. Pt will benefit from skilled OT services to improve functional mobility and self care ADl routine. Patient Education  Patient Education: ADL's    Goals  Short term goals  Time Frame for Short term goals: 2 weeks  Short term goal 1: Pt will complete visual scanning to L side with min cues during ADLs to decrease fall risk during ADLs. Short term goal 2: Pt will complete LB ADL with set up and adaptive techs to increase indep with self care.   Short term goal 3: Pt will complete dynamic standing task with B hand release and SBA x 4 min to increase balance required for ADL completion. Short term goal 4: Pt will complete functional mobility to/from BR with AD and SBA to increase indep with self care. Long term goals  Time Frame for Long term goals : 4 weeks  Long term goal 1: Pt will complete light ADL MOD I to improve indep in preparing a cup of coffee. Long term goal 2: Pt will complete ADL routine including shower transfer with S and adaptive techs to increase indep with self care. Long term goal 3: Pt will complete dynamic standing task with B hand release and S x 10 min to increase balance required for ADL completion. Long term goal 4: Pt will complete functional mobility to/from BR with AD and MOD I to increase indep with self care. Long term goal 5: Pt will complete toileting routine including transfer with S to increase indep. Following session, patient left in safe position with all fall risk precautions in place.

## 2021-07-13 NOTE — PROGRESS NOTES
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 female who presents to the emergency department for evaluation of fall. Patient has had multiple falls over the past couple weeks, refused EMS transfer each time. Today patient had a fall, striking her right temple. Patient is on Plavix. 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 Only pain that she is complaining of is to her right temple. Per MRI: Heterogeneous 5.5 cm nodular peripherally enhancing cystic and solid mass centered at the right occipital/temporal lobe junction with prominent adjacent T2/FLAIR prolongation involving the corpus callosum, temporal, parietal and frontal lobes with prominent mass effect on the right lateral ventricle and 9 mm leftward midline shift. (High grade glioma, glioblastoma multiform). Pt is S/P R craniotomy on 21. Pt admitted to inpatient rehab unit on 2021    Restrictions/Precautions:  Restrictions/Precautions: General Precautions, Fall Risk  Position Activity Restriction  Other position/activity restrictions: L neglect, R artificial eye     SUBJECTIVE: Patient in chair upon arrival agreeable to OT treatment, reporting she talked to dr Juana Kuhn that she feels discomfort when urinating     PAIN: no /    Vitals: Vitals not assessed per clinical judgement, see nursing flowsheet    COGNITION: Slow Processing, Decreased Insight and Decreased Safety Awareness    ADL:   Grooming: Contact Guard Assistance.   at sink during hand hygiene   Toileting:

## 2021-07-13 NOTE — PROGRESS NOTES
[facility]  INPATIENT SPEECH THERAPY  [address] [facility]  DAILY NOTE    TIME   SLP Individual Minutes  Time In: 1401  Time Out: 5990  Minutes: 31  Timed Code Treatment Minutes: 31 Minutes        Date: 2021  Patient Name: Wild Harrison      CSN: [id number]   : 1933  (80 y.o.)  Gender: female   Referring Physician: Nas Warren MD  Diagnosis: S/p craniotomy   Secondary Diagnosis: Dysphagia, cognitive linguistic deficits  Precautions: Fall risk, aspiration precautions   Current Diet: Regular, thin liquids   Swallowing Strategies: Full Upright Position, Small Bite/Sip and Alternate Solids and Liquids     Date of Last MBS/FEES: Not Applicable    Pain:  No pain reported. Subjective:  Patient seen sitting upright in recliner upon ST arrival. Patient pleasant, alert, and cooperative throughout [address] session. No family present. Short-Term Goals:  SHORT TERM GOAL #1:  Goal 1: Pt will consume a regular diet and thin liquids without overt s/s of aspiration to meet nutritional/hydration measures safely. INTERVENTIONS: DNT due to focus on additional STGs. SHORT TERM GOAL #2:  Goal 2: Patient will complete functional immediate/delayed/prospective recall tasks (inclusion of compensatory strategies) with 80% accuracy, min cues to improve retention of pertinent information. INTERVENTIONS:   Delayed Recall of ST Information from Previous Session (Four Units): 3/4 independent, 1/4 with visual cuing    SHORT TERM GOAL #3:  Goal 3: Pt will complete problem solving, verbal/visual reasoning, and executive functioning tasks (safety time, basic money/math comprehensive of preset up pill box) with 70% accuracy, mod cues to improve contribution within ADLs/IADLs.   INTERVENTIONS:   Medication Management with Pill Box  Medication 1: Patient with need for at least moderate cuing to complete task with correct dosage with an additional maximum cuing to review pill box for completion Medication 2: Patient with need for at least moderate cuing to complete task with perseveration on previous medication dosage recommendations and inconsistent use of pill box. *Patient with multiple errors with need for ST identification and request for patient to correct  *Patient with at least moderate cuing needed to complete task progression/sequencing (e.g., open/close compartments, initiate task with Sunday-Saturday, etc.)  *Significantly increased time needed to complete task (>20 minutes)  *Patient with POOR completion of task with barriers to completion including: physical limitations, visual impairment, etc.    SHORT TERM GOAL #4:  Goal 4: Pt will complete sequencing and thought organization tasks (i.e. divergent naming, multi step commands, complex yes/no questions) with 70% accuracy, mod cues to improve mental flexibility and processing speed. INTERVENTIONS: DNT due to focus on additional STGs. SHORT TERM GOAL #5:  Goal 5: Pt will complete functional attention tasks (i.e. sustained, selective, alternating) with no more than x3-4 errors/redirections within task completion for successful management of ADL's post discharge. INTERVENTIONS: Adequate sustained attention for entirety of 30 minute therapy session with no re-directions required. Long-Term Goals:  Timeframe for Long-term Goals: 2 weeks:  Goal 1: Pt will improve cognitive-linguistic skills to a supervision level of assist for safe, functional return to home setting with family support post discharge. ONGOING   Goal 2: Pt will safely consume regular diet+thin liquids demonstrating independent carryover of trained compensatory swallowing strategies and without overt s/s aspiration or pulmonary compromise to assist with nutrition/hydration measures.  ONGOING     Comprehension: 3 - Patient understands basic needs 50-74% of the time  Expression: 4 - Expresses basic ideas/needs 75-90%+ of the time  Social Interaction: 4 - Patient appropriate 75-90%+ of the time  Problem Solvin - Patient solves simple/routine tasks 25%-49%  Memory: 3 - Patient remembers 50%-74% of the time      EDUCATION:  Learner: Patient  Education:  Reviewed ST goals and Plan of Care and Reviewed recommendations for follow-up  Evaluation of Education: Demonstrates with assistance, Needs further instruction and Family not present    ASSESSMENT/PLAN:  Activity Tolerance:  Patient tolerance of  treatment: good. Cooperative with ST and POC. Assessment/Plan: Patient progressing toward established goals. Continues to require skilled care of licensed speech pathologist to progress toward achievement of established goals and plan of care. .     Plan for Next Session: sequencing, thought organization, selective attention        Florentin Marroquin M.S., [facility]

## 2021-07-13 NOTE — PLAN OF CARE
Problem: Falls - Risk of:  Goal: Will remain free from falls  Description: Will remain free from falls  Outcome: Ongoing  Note: Patient remains free from falls this shift. Fall precautions in place. Non skid footwear used with transferring. Educated patient to use call light when needed assistance with ambulation. Patient used call light appropriate this shift. Problem: Skin Integrity:  Goal: Will show no infection signs and symptoms  Description: Will show no infection signs and symptoms  Outcome: Ongoing  Note: No skin breakdown during hospitalization. Problem: Pain:  Goal: Pain level will decrease  Description: Pain level will decrease  Outcome: Ongoing  Note: Patient denies pain this shift. Problem: Mobility - Impaired:  Goal: Mobility will improve  Description: Mobility will improve  Outcome: Ongoing  Note: Patient is a one assist to transfer. Problem: Infection - Surgical Site:  Goal: Will show no infection signs and symptoms  Description: Will show no infection signs and symptoms  Outcome: Ongoing  Note: No skin breakdown during hospitalization.

## 2021-07-13 NOTE — PROGRESS NOTES
entry  [address]: 4 wheeled walker, Salma Ok, [facility] chair, [address] [facility] [facility]   Bathroom Shower/Tub: Tub/Shower unit, Shower chair with back  Bathroom Toilet: Standard  Bathroom Equipment: Shower chair, 3-in-1 commode    Receives Help From: Personal care attendant (Monday-Friday 2 hours)  ADL Assistance: Needs assistance  Homemaking Assistance: Needs assistance  Homemaking Responsibilities: Yes  Ambulation Assistance: Independent  Transfer Assistance: Independent  Active : No  Additional Comments: 2 hours/day DIVINA has an aide that assists with ADLs/IADLs. nieces live nearby and able to help at times. Restrictions/Precautions:  Restrictions/Precautions: General Precautions, Fall Risk  Position Activity Restriction  Other position/activity restrictions: L neglect, R artificial eye     SUBJECTIVE: Pt in chair upon arrival. Pt required encouragement to participate this date taking some time due to possible UTI making patient not feel good. Pt confused at time throughout and states \"just feels funny\". Pt pleasant and cooperative throughout. Pt returned to room with OT ready for session    PAIN: not rated, stomach    Vitals: Vitals not assessed per clinical judgement, see nursing flowsheet    OBJECTIVE:  Bed Mobility:  Not Tested    Transfers:  Sit to Stand: Stand By Assistance, [address], with increased time for completion, cues for hand placement, with verbal cues  Stand to Sit:Stand By Assistance, [address], with increased time for completion, cues for hand placement, with verbal cues  CGA when pt impulsive. Pt has bad carry over at times and required cues for alignment and hand placement     Ambulation:  Brief Stand By Assistance, [address], with verbal cues   Distance: 175', 100', shorter distances throughout gym  Surface: Level Tile  Device:4 Wheeled Walker  Gait Deviations:   Forward Flexed Posture, Slow Gillian, Decreased Step Length Bilaterally, Decreased Gait Speed, Decreased Heel Strike Bilaterally and Mild Path Deviations due to low eyesight   Cues for slow pace, focusing, posture, and proximity to walker. Pt distracts really easily and veers each way when this happens    Pt weaving through cones 10'x2 with patient bumping into cones at times. Many cues given for sequencing, proximity to walker, posture, and missing the cones. Pt completed in a uncoordinated manner with many stops    Stairs:  Stairs:  6\" steps. X 4 using Bilateral Handrails and Contact Guard Assistance. Cues for sequencing. Pt demos difficulty going only 1 step at a time and weakness noted with Left side. Difficult for patient descent due to decreased depth perception    Wheelchair Mobility:  [address], with verbal cues , with increased time for completion  Extremities Used: Bilateral Upper Extremities  Type of Chair:Manual  Surface: Level Tile  Distance: 100'x1  Quality: Slow Velocity, Short Strokes, Veers Right and Decreased Fluidity  Cues for decreased posterior lean, using B UEs at same time, and focusing on one object in front to prevent path deviations     Balance:  Dynamic Standing Balance: Contact Guard Assistance, Minimal Assistance Pt stood on foam while playing ring toss. Pt CGA majority of time and at times minimal assist due to moderate sway. Cues for maintaining a good QUIRINO and posture. Completed ~10 minutes with seated rest break    Exercise:  Patient was guided in 1 set(s) 10 reps of exercise to both lower extremities. Ankle pumps, Glut sets, Heelslides, Hip abduction/adduction, Straight leg raises, Seated marches, Seated hamstring curls and Long arc quads. Exercises were completed for increased independence with functional mobility. All exercises completed with manual resistance     Functional Outcome Measures: Not completed       ASSESSMENT:  Assessment: Patient progressing toward established goals.   Activity Tolerance:  Patient tolerance of  treatment: Patient will ascend/descend 1 step with a RW and CGA for community entry. Long term goal 5: Patient will propel wheelchair 100' with modified independence to navigate living envionment/community safely    Following session, patient left in safe position with all fall risk precautions in place.

## 2021-07-14 LAB
ERYTHROCYTE [DISTWIDTH] IN BLOOD BY AUTOMATED COUNT: 14.5 % (ref 11.5–14.5)
ERYTHROCYTE [DISTWIDTH] IN BLOOD BY AUTOMATED COUNT: 52.4 FL (ref 35–45)
GLUCOSE BLD-MCNC: 104 MG/DL (ref 70–108)
GLUCOSE BLD-MCNC: 128 MG/DL (ref 70–108)
GLUCOSE BLD-MCNC: 167 MG/DL (ref 70–108)
GLUCOSE BLD-MCNC: 80 MG/DL (ref 70–108)
HCT VFR BLD CALC: 36.3 % (ref 37–47)
HEMOGLOBIN: 10.8 GM/DL (ref 12–16)
MCH RBC QN AUTO: 29.1 PG (ref 26–33)
MCHC RBC AUTO-ENTMCNC: 29.8 GM/DL (ref 32.2–35.5)
MCV RBC AUTO: 97.8 FL (ref 81–99)
PLATELET # BLD: 130 THOU/MM3 (ref 130–400)
PMV BLD AUTO: 12.1 FL (ref 9.4–12.4)
RBC # BLD: 3.71 MILL/MM3 (ref 4.2–5.4)
WBC # BLD: 9.4 THOU/MM3 (ref 4.8–10.8)

## 2021-07-14 PROCEDURE — 97530 THERAPEUTIC ACTIVITIES: CPT

## 2021-07-14 PROCEDURE — 6370000000 HC RX 637 (ALT 250 FOR IP): Performed by: EMERGENCY MEDICINE

## 2021-07-14 PROCEDURE — 97110 THERAPEUTIC EXERCISES: CPT

## 2021-07-14 PROCEDURE — 1180000000 HC REHAB R&B

## 2021-07-14 PROCEDURE — 6360000002 HC RX W HCPCS: Performed by: INTERNAL MEDICINE

## 2021-07-14 PROCEDURE — 97535 SELF CARE MNGMENT TRAINING: CPT

## 2021-07-14 PROCEDURE — 97130 THER IVNTJ EA ADDL 15 MIN: CPT

## 2021-07-14 PROCEDURE — 97116 GAIT TRAINING THERAPY: CPT

## 2021-07-14 PROCEDURE — 85027 COMPLETE CBC AUTOMATED: CPT

## 2021-07-14 PROCEDURE — 36415 COLL VENOUS BLD VENIPUNCTURE: CPT

## 2021-07-14 PROCEDURE — 97129 THER IVNTJ 1ST 15 MIN: CPT

## 2021-07-14 PROCEDURE — 82948 REAGENT STRIP/BLOOD GLUCOSE: CPT

## 2021-07-14 PROCEDURE — 99232 SBSQ HOSP IP/OBS MODERATE 35: CPT | Performed by: PHYSICAL MEDICINE & REHABILITATION

## 2021-07-14 PROCEDURE — 6370000000 HC RX 637 (ALT 250 FOR IP): Performed by: PHYSICAL MEDICINE & REHABILITATION

## 2021-07-14 RX ORDER — DEXAMETHASONE 4 MG/1
2 TABLET ORAL 2 TIMES DAILY
Status: DISCONTINUED | OUTPATIENT
Start: 2021-07-14 | End: 2021-07-19

## 2021-07-14 RX ADMIN — DEXAMETHASONE 2 MG: 4 TABLET ORAL at 20:24

## 2021-07-14 RX ADMIN — PANTOPRAZOLE SODIUM 40 MG: 40 TABLET, DELAYED RELEASE ORAL at 05:35

## 2021-07-14 RX ADMIN — DOCUSATE SODIUM 100 MG: 100 CAPSULE ORAL at 08:46

## 2021-07-14 RX ADMIN — HYDRALAZINE HYDROCHLORIDE 25 MG: 25 TABLET ORAL at 20:24

## 2021-07-14 RX ADMIN — LEVETIRACETAM 500 MG: 500 TABLET, FILM COATED ORAL at 20:24

## 2021-07-14 RX ADMIN — HYDRALAZINE HYDROCHLORIDE 25 MG: 25 TABLET ORAL at 13:42

## 2021-07-14 RX ADMIN — NITROFURANTOIN MONOHYDRATE/MACROCRYSTALLINE 100 MG: 25; 75 CAPSULE ORAL at 20:24

## 2021-07-14 RX ADMIN — HYDRALAZINE HYDROCHLORIDE 25 MG: 25 TABLET ORAL at 05:35

## 2021-07-14 RX ADMIN — LEVOTHYROXINE SODIUM 100 MCG: 0.1 TABLET ORAL at 05:35

## 2021-07-14 RX ADMIN — ALPRAZOLAM 0.25 MG: 0.25 TABLET ORAL at 08:47

## 2021-07-14 RX ADMIN — NITROFURANTOIN MONOHYDRATE/MACROCRYSTALLINE 100 MG: 25; 75 CAPSULE ORAL at 08:46

## 2021-07-14 RX ADMIN — SERTRALINE 50 MG: 50 TABLET, FILM COATED ORAL at 08:46

## 2021-07-14 RX ADMIN — DEXAMETHASONE 2 MG: 4 TABLET ORAL at 05:35

## 2021-07-14 RX ADMIN — ISOSORBIDE MONONITRATE 30 MG: 30 TABLET ORAL at 08:46

## 2021-07-14 RX ADMIN — ATORVASTATIN CALCIUM 10 MG: 10 TABLET, FILM COATED ORAL at 20:24

## 2021-07-14 RX ADMIN — ACETAMINOPHEN 650 MG: 325 TABLET ORAL at 08:47

## 2021-07-14 RX ADMIN — SERTRALINE 50 MG: 50 TABLET, FILM COATED ORAL at 20:24

## 2021-07-14 RX ADMIN — DEXAMETHASONE 2 MG: 4 TABLET ORAL at 13:42

## 2021-07-14 RX ADMIN — LEVETIRACETAM 500 MG: 500 TABLET, FILM COATED ORAL at 08:46

## 2021-07-14 ASSESSMENT — PAIN SCALES - GENERAL
PAINLEVEL_OUTOF10: 5
PAINLEVEL_OUTOF10: 5
PAINLEVEL_OUTOF10: 0

## 2021-07-14 NOTE — PROGRESS NOTES
(MACROBID) capsule 100 mg, 100 mg, Oral, 2 times per day  dexamethasone (DECADRON) tablet 2 mg, 2 mg, Oral, 3 times per day  melatonin tablet 3 mg, 3 mg, Oral, Nightly PRN  acetaminophen (TYLENOL) tablet 650 mg, 650 mg, Oral, Q4H PRN  magnesium hydroxide (MILK OF MAGNESIA) 400 MG/5ML suspension 30 mL, 30 mL, Oral, Daily PRN  ondansetron (ZOFRAN-ODT) disintegrating tablet 4 mg, 4 mg, Oral, Q8H PRN **OR** [DISCONTINUED] ondansetron (ZOFRAN) injection 4 mg, 4 mg, Intravenous, Q6H PRN  fleet rectal enema 1 enema, 1 enema, Rectal, Daily PRN  lidocaine 4 % external patch 2 patch, 2 patch, Topical, Daily  polyethylene glycol (GLYCOLAX) packet 17 g, 17 g, Oral, Daily  ALPRAZolam (XANAX) tablet 0.25 mg, 0.25 mg, Oral, TID PRN  atorvastatin (LIPITOR) tablet 10 mg, 10 mg, Oral, Nightly  dextrose 5 % solution, 100 mL/hr, Intravenous, PRN  dextrose 50 % IV solution, 12.5 g, Intravenous, PRN  docusate sodium (COLACE) capsule 100 mg, 100 mg, Oral, BID  glucagon (rDNA) injection 1 mg, 1 mg, Intramuscular, PRN  glucose (GLUTOSE) 40 % oral gel 15 g, 15 g, Oral, PRN  hydrALAZINE (APRESOLINE) tablet 25 mg, 25 mg, Oral, 3 times per day  insulin lispro (HUMALOG) injection vial 0-6 Units, 0-6 Units, Subcutaneous, TID WC  insulin lispro (HUMALOG) injection vial 0-3 Units, 0-3 Units, Subcutaneous, Nightly  isosorbide mononitrate (IMDUR) extended release tablet 30 mg, 30 mg, Oral, Daily  levETIRAcetam (KEPPRA) tablet 500 mg, 500 mg, Oral, BID  levothyroxine (SYNTHROID) tablet 100 mcg, 100 mcg, Oral, Daily  pantoprazole (PROTONIX) tablet 40 mg, 40 mg, Oral, QAM AC  sertraline (ZOLOFT) tablet 50 mg, 50 mg, Oral, BID    ASSESSMENT AND PLAN  Patient Active Problem List:     GERD (gastroesophageal reflux disease)     Carotid stenosis     Temporal arteritis (HCC)     Hypercholesteremia     Hypothyroidism due to acquired atrophy of thyroid     Steroid-induced hyperglycemia     Blind right eye     Current chronic use of systemic steroids Personal history of cardiac dysrhythmia     Coronary artery disease involving native coronary artery of native heart without angina pectoris     Anxiety     H/O: CVA (cerebrovascular accident)     Lumbosacral spinal stenosis     Vitamin D deficiency     Hx of breast cancer with liver mets     Breast cancer metastasized to liver [facility])     Fall     Traumatic ecchymosis of forehead     Brain mass     Subarachnoid hemorrhage following injury, no loss of consciousness (HCC)     Traumatic ecchymosis of left lower leg     Traumatic ecchymosis of left forearm     Platelet inhibition due to Plavix     Fall at home, initial encounter     Brain edema [facility])     Intraparenchymal hemorrhage of brain ([facility] [address]     Closed head injury     Breast cancer metastasized to brain, right [facility])     Status post craniotomy     Impaired functional mobility, balance, gait, and endurance    A 1 Breast Cancer Diagnosed in 2016 with Liver mets. With Complete Remission therapy 2 years       Ago.     2 Brain met from breast primary ER/NV negative. Her 2 positive S/P resection      3 Negative CT Chest, Abdomen and Pelvis for metastatic Disease. Plan[de-identified]   Decrease Decadron to 2 mg  BID well. Blood sugars are down. Continue PT/OT              Will give [place]tu therapy once discharged.  It crosses blood brain barrier.                  INÉS Jaime,CWS  6:29 PM  7/14/2021

## 2021-07-14 NOTE — PROGRESS NOTES
[facility] [address]  INPATIENT PHYSICAL THERAPY  DAILY NOTE  [address] [facility] - 7E-[id number]-A    Time In: 1030  Time Out: 1100  Timed Code Treatment Minutes: 60 Minutes  Minutes: 30          Date: 2021  Patient Name: Sridhar Gauthier,  Gender:  female        MRN: [id number]  : 1933  (80 y.o.)  Referral Date : 21  Referring Practitioner: Etienne Marquis MD  Diagnosis: Breast Cancer Metastisized to brain, right  Additional Pertinent Hx: Per H&P:Latanya Rod is a 80 y.o. female admitted to [facility] [address] on 2021. 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 ([address] Carotid stenosis, Colostomy in place [facility]), Degenerative arthritis of cervical spine, GERD (gastroesophageal reflux disease), Hypercholesteremia, Hypoestrogenism, Hypothyroidism, Lumbago, Lumbosacral spinal stenosis, MDRO (multiple drug resistant organisms) resistance, Personal history of cardiac dysrhythmia, Sigmoid diverticulosis, Spondylolisthesis of lumbosacral region, Steroid-induced hyperglycemia, Subclavian artery stenosis ([address] Superior and Inferior Pubic ramus fracture, right, closed, initial encounter ([address] SVT (supraventricular tachycardia) ([address] Temporal arteritis ([address] Vertebral artery occlusion, and Vitamin D deficiency. Patient presented to [facility] ER after suffering a fall at home. Patient was attempting to ambulate when her legs gave out and she hit her nightstand. No LOC. She was too weak to get up. Her [place] aide arrived and found her on the floor and called EMS. Patient underwent CT head and found to have right side brain mass as well as small subarachnoid hemorrhage. She is s/p debulking of the brain mass per Dr. Jeanie Mcdonald 2021; Biopsy was c/w metastatic disease from her breast cancer.      Prior Level of Function:  Lives With: Alone  Type of Home: House  Home Layout: One level  Home Access: Level entry  [address]: 4 wheeled walker, [address] [facility] [place], [facility] chair, [address] bed   Bathroom Shower/Tub: Tub/Shower unit, Shower chair with back  Bathroom Toilet: Standard  Bathroom Equipment: Shower chair, 3-in-1 commode    Receives Help From: Personal care attendant (Monday-Friday 2 hours)  ADL Assistance: Needs assistance  Homemaking Assistance: Needs assistance  Homemaking Responsibilities: Yes  Ambulation Assistance: Independent  Transfer Assistance: Independent  Active : No  Additional Comments: 2 hours/day DIVINA has an aide that assists with ADLs/IADLs. nieces live nearby and able to help at times. Restrictions/Precautions:  Restrictions/Precautions: General Precautions, Fall Risk  Position Activity Restriction  Other position/activity restrictions: L neglect, R artificial eye     SUBJECTIVE: Patient seated in bedside chair upon arrival. Patient pleasant and agreeable to therapy. PAIN: 0/10:     Vitals: Vitals not assessed per clinical judgement, see nursing flowsheet    OBJECTIVE:  Bed Mobility:  Not Tested    Transfers:  Sit to Stand: Stand By Assistance, cues for hand placement  Stand to Sit:Stand By Assistance, cues for hand placement    Ambulation:  Stand By Assistance, with increased time for completion  Distance: 150'x1  Surface: Level Tile  Device:4 Wheeled Walker  Gait Deviations: Forward Flexed Posture, Slow Gillian, Decreased Step Length Bilaterally, Decreased Gait Speed, Decreased Heel Strike Bilaterally, Mild Path Deviations and Path deviations due to distractions while ambulating. Stairs:  Stairs:  4\" steps. X 5 using Bilateral Handrails and Contact Guard Assistance, with increased time for completion. Exercise:  Patient was guided in 1 set(s) 10 reps of exercise to both lower extremities. Seated marches, Seated heel/toe raises and Long arc quads. Exercises were completed for increased independence with functional mobility.     Functional Outcome Measures: Not completed

## 2021-07-14 NOTE — PROGRESS NOTES
[address] [place] [address]  INPATIENT PHYSICAL THERAPY  DAILY NOTE  [facility] - 7E-64/064-A    Time In: 0730  Time Out: 0830  Timed Code Treatment Minutes: 60 Minutes  Minutes: 60          Date: 2021  Patient Name: Deborah Virk,  Gender:  female        MRN: [id number]  : 1933  (80 y.o.)  Referral Date : 21  Referring Practitioner: Dayana Cintron MD  Diagnosis: Breast Cancer Metastisized to brain, right  Additional Pertinent Hx: Per H&P:Latanya Rod is a 80 y.o. female admitted to [address] [place] [address] on 2021. 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 ([address] Carotid stenosis, Colostomy in place [facility]), Degenerative arthritis of cervical spine, GERD (gastroesophageal reflux disease), Hypercholesteremia, Hypoestrogenism, Hypothyroidism, Lumbago, Lumbosacral spinal stenosis, MDRO (multiple drug resistant organisms) resistance, Personal history of cardiac dysrhythmia, Sigmoid diverticulosis, Spondylolisthesis of lumbosacral region, Steroid-induced hyperglycemia, Subclavian artery stenosis ([address] Superior and Inferior Pubic ramus fracture, right, closed, initial encounter ([address] SVT (supraventricular tachycardia) ([address] Temporal arteritis ([address] Vertebral artery occlusion, and Vitamin D deficiency. Patient presented to [facility] ER after suffering a fall at home. Patient was attempting to ambulate when her legs gave out and she hit her nightstand. No LOC. She was too weak to get up. Her [place] aide arrived and found her on the floor and called EMS. Patient underwent CT head and found to have right side brain mass as well as small subarachnoid hemorrhage. She is s/p debulking of the brain mass per Dr. Ursula Barthel 2021; Biopsy was c/w metastatic disease from her breast cancer.      Prior Level of Function:  Lives With: Alone  Type of Home: House  Home Layout: One level  Home Access: Level entry  [address]: 4 wheeled walker, [address], [address], [facility] chair, [address] bed   Bathroom Shower/Tub: Tub/Shower unit, Shower chair with back  Bathroom Toilet: Standard  Bathroom Equipment: Shower chair, 3-in-1 commode    Receives Help From: Personal care attendant (Monday-Friday 2 hours)  ADL Assistance: Needs assistance  Homemaking Assistance: Needs assistance  Homemaking Responsibilities: Yes  Ambulation Assistance: Independent  Transfer Assistance: Independent  Active : No  Additional Comments: 2 hours/day DIVINA has an aide that assists with ADLs/IADLs. nieces live nearby and able to help at times. Restrictions/Precautions:  Restrictions/Precautions: General Precautions, Fall Risk  Position Activity Restriction  Other position/activity restrictions: L neglect, R artificial eye     SUBJECTIVE: Pt in bed upon arrival. Pt requests warm towel for eyes due to eye crust. Pt states arthritis is acting up in R ankle. Time taken for restroom use and activity with mobility. Pt returned to chair in room with all needs within reach. Pt expresses frustrations with staying here and wished to return home with 6year old dog soon and start cancer treatment, support provided    PAIN: 10/10: ankle    Vitals: Vitals not assessed per clinical judgement, see nursing flowsheet    OBJECTIVE:  Bed Mobility:  Supine to Sit: Stand By Assistance    Transfers:  Sit to Stand: Stand By Assistance, cues for hand placement, with verbal cues. Stand to Sit:Stand By Assistance, cues for hand placement, with verbal cues. 1 cue for alignment for toilet transfer  [facility], cues for hand placement, with verbal cues.  Step by step commands for sequencing   Pt demos poor carry over and able to remember hand placement 50% of time with improvement throughout treatment     Pt completed multiple STS drom various surfaces in apartment for improved STS    Ambulation:  Stand By Assistance, [employer], with verbal cues   Distance: 200'x1, 10' ramp, 50' carpet  Surface: Level Tile, Carpet and Ramp  Device:4 Wheeled Walker  Gait Deviations: Forward Flexed Posture, Slow Gillian, Decreased Step Length Bilaterally, Decreased Gait Speed, Decreased Heel Strike Bilaterally, Mild Path Deviations and Unsteady Gait  Pt distracts easily resulting in mild path deviations. Cues for proximity to walker, posture, and focusing. Pt impulsive with ambulation at times demonstrating decreased safety awareness     Pt ambulated on carpet while scanning for cones around apartment while retrieving cones from floor to higher surfaces to promote proper body mechanics and improved gait with carpet for home use. Pt demos difficulty scanning due to decreased vision. Pt cues for sequencing and body mechanics with good carry over. Pt verbalizes understanding with locking walker each time to retrieve a cone. Increased time to complete     10' on ramp with cues for controlled descent with RW CGA to complete    Balance:  Dynamic Sitting Balance: Stand By Assistance. Pt sat on toilet and competed pericare ~5 minutes with emotional support given for hospital stay. Pt completed with no UE support. Cues for posture and maintaining a good QUIRINO. No sway or LOB  Dynamic Standing Balance: Stand By Assistance, [employer]. Pt stood and completed daily tasks: handwashing, oral care,  And clothing management. 0-1 UE support used with majority of time with no UE. Cues for walker placement, posture, and positioning to sink. Pt demos minimal sway and no LOB, increased time to complete ~10 minutes       Exercise:  Not tested    Functional Outcome Measures: Not completed       ASSESSMENT:  Assessment: Patient progressing toward established goals. Activity Tolerance:  Patient tolerance of  treatment: good. Pt tolerated session well. Decreased strength and balance. Pt distracts very easily causing decreased safety awareness at times.  Pt moving well this date. Cues throughout for sequencing and staying on task     Equipment Recommendations:Equipment Needed: Yes  Other: Pt may need a w/c. Has [employer], lift chair and hospital bed  Discharge Recommendations:  Continue to assess pending progress    Plan: Times per week: 5x/wk 90min, 1x/wk 30min  Times per day: Daily  Current Treatment Recommendations: Strengthening, Transfer Training, Endurance Training, Neuromuscular Re-education, Patient/Caregiver Education & Training, Balance Training, Gait Training, Wheelchair Mobility Training, Home Exercise Program, Functional Mobility Training, Stair training, Safety Education & Training    Patient Education  Patient Education: Plan of Care, [employer] Mobility, Transfers, Gait, Use of Gait Marble, Car Transfers, Verbal Exercise Instruction, education    Goals:  Patient goals : go home  Short term goals  Time Frame for Short term goals: 1 week  Short term goal 1: Patient will complete supine < > sit with modified independence to transfer in/out of bed safely. GOAL MET  Short term goal 2: Patient will complete sit < > stand with SBA and verbal cues to less than or equal to 50% of the time to stand to ambulate with decreased difficulty. Short term goal 3: Patient will ambulate 76' with a RW and CGA to progress towards ambulating household distances safely. GOAL MET, SEE LTG  Short term goal 4: Patient will propel wheelchair 100' with SBA to increase independence and mobility. Long term goals  Time Frame for Long term goals : 4 weeks  Long term goal 1: Patient will complete sit < > stand and stand pivot transfers with modified independence to transfer surface to surface safely. Long term goal 2: Patient will ambulate 80' with a RW and supervision to navigate home with decreased difficulty. Long term goal 3: Patient will improve tinetti to greater than or equal to 19/28 to reduce her risk for falls.   Long term goal 4: Patient will ascend/descend 1 step with a RW and CGA for community entry.  Long term goal 5: Patient will propel wheelchair 100' with modified independence to navigate living envionment/community safely    Following session, patient left in safe position with all fall risk precautions in place.

## 2021-07-14 NOTE — PLAN OF CARE
Problem: Falls - Risk of:  Goal: Will remain free from falls  Description: Will remain free from falls  Outcome: Ongoing  Pt will remain free of falls. Pt is 1 assist with walker and gait belt, pt can be unsteady at times. Problem: Skin Integrity:  Goal: Will show no infection signs and symptoms  Description: Will show no infection signs and symptoms  Outcome: Ongoing  Skin assessment every shift. No new areas of skin breakdown. Problem: Pain:  Goal: Pain level will decrease  Description: Pain level will decrease  Outcome: Ongoing  Reposition frequently to alleviate pain. Pt has prn Tylenol for mild to moderate pain. Problem: Mobility - Impaired:  Goal: Mobility will improve  Description: Mobility will improve  Outcome: Ongoing  Pt will participate in PT daily as directed to increase mobility. Problem: Infection - Surgical Site:  Goal: Will show no infection signs and symptoms  Description: Will show no infection signs and symptoms  Outcome: Ongoing  Surgical site will be assessed every shift for warmth to site, redness, pain or drainage. Pt has covered incisions to head, no drainage noted. Problem: Serum Glucose Level - Abnormal:  Goal: Ability to maintain appropriate glucose levels has stabilized  Description: Ability to maintain appropriate glucose levels has stabilized  Outcome: Ongoing  Chems are ac/hs   pt has humalog sliding scale, none was given this morning or lunchtime.

## 2021-07-14 NOTE — PROGRESS NOTES
Family Medicine Progress Notes/Coverage    Today's Date: 7/14/21  7E-64/064-A  Medical Record # [id number]  CSN/Account # [de-identified]      Ms. Rod admitted on 7/8/2021        Subjective / Interval History :     Dysuria is getting better  No HA, no weakness  Reviewed notes, consults, laboratory and radiology results,    Objective:       Physical Exam:  Patient Vitals for the past 24 hrs:   BP Temp Temp src Pulse Resp SpO2 Weight   07/14/21 0654 -- -- -- -- -- -- 138 lb 4.8 oz (62.7 kg)   07/14/21 0530 (!) 153/66 -- -- 61 -- -- --   07/13/21 1930 (!) 148/64 98.4 °F (36.9 °C) Oral 76 16 94 % --   07/13/21 1424 (!) 147/51 -- -- -- -- -- --       General Appearance:  Alert, cooperative, no distress, appears stated age   HEENT:  Neck:      Chest/Lungs:  Heart: CTA  RRR   Abdomen:  Back: soft   Extremities:  Neurological Exam: No edema       Assessment:     Admitting Diagnosis:    Breast cancer metastasized to brain, right ([facility] [C50.911, C79.31]    Active Hospital Problems    Diagnosis Date Noted    Impaired functional mobility, balance, gait, and endurance [Z74.09] 07/08/2021     Priority: High    Breast cancer metastasized to brain, right ([address] [C50.911, C79.31] 07/06/2021     Priority: High    Status post craniotomy [Z98.890] 07/02/2021     Priority: High    Subarachnoid hemorrhage following injury, no loss of consciousness ([facility] [S06.6X0A] 06/28/2021     Priority: High    Fall [W19. XXXA] 06/28/2021     Priority: High    Fall at home, initial encounter [C93. Tami Gottron, S53.562] 06/28/2021     Priority: Medium    Current chronic use of systemic steroids [Z79.52] 09/04/2018     Priority: Medium    Coronary artery disease involving native coronary artery of native heart without angina pectoris [I25.10] 11/06/2017     Priority: Medium    Anxiety [F41.9] 06/29/2016     Priority: Medium    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]      Priority: Medium    Hypothyroidism due to acquired atrophy of thyroid [E03.4] 03/03/2016     Priority: Medium    Temporal arteritis ([address] [M31.6]      Priority: Medium    GERD (gastroesophageal reflux disease) [K21.9]      Priority: Medium       Plan:     Discussed plans with the nursing staff  Continue rehab    Medications, Laboratories and Imaging results:    Scheduled Meds:   nitrofurantoin (macrocrystal-monohydrate)  100 mg Oral 2 times per day    dexamethasone  2 mg Oral 3 times per day    lidocaine  2 patch Topical Daily    polyethylene glycol  17 g Oral Daily    atorvastatin  10 mg Oral Nightly    docusate sodium  100 mg Oral BID    hydrALAZINE  25 mg Oral 3 times per day    insulin lispro  0-6 Units Subcutaneous TID WC    insulin lispro  0-3 Units Subcutaneous Nightly    isosorbide mononitrate  30 mg Oral Daily    levETIRAcetam  500 mg Oral BID    levothyroxine  100 mcg Oral Daily    pantoprazole  40 mg Oral QAM AC    sertraline  50 mg Oral BID     Continuous Infusions:   dextrose       PRN Meds:melatonin, acetaminophen, magnesium hydroxide, ondansetron **OR** [DISCONTINUED] ondansetron, fleet, ALPRAZolam, dextrose, dextrose, glucagon (rDNA), glucose    Imaging:    Lab Review :    Lab Results   Component Value Date    WBC 9.4 07/14/2021    HGB 10.8 (L) 07/14/2021    HCT 36.3 (L) 07/14/2021    MCV 97.8 07/14/2021     07/14/2021     Lab Results   Component Value Date    CREATININE 0.5 07/09/2021    BUN 28 (H) 07/09/2021     07/09/2021    K 4.8 07/09/2021     07/09/2021    CO2 30 07/09/2021     Lab Results   Component Value Date    CKTOTAL 70 06/28/2021     Lab Results   Component Value Date    ALT 81 (H) 07/06/2021    AST 78 (H) 07/06/2021    ALKPHOS 53 07/06/2021    BILITOT 0.5 07/06/2021     Lab Results   Component Value Date    LIPASE 12.6 08/12/2020         Electronically signed by Mark Carballo MD on 7/14/21 at 8:26 AM AMELIA Coleman M.D.

## 2021-07-14 NOTE — PROGRESS NOTES
[address]  Occupational Therapy  Daily Note  Time:   Time In: 1100  Time Out: 1230  Timed Code Treatment Minutes: 90 Minutes  Minutes: 90          Date: 2021  Patient Name: Olena Mariano,   Gender: female      Room: [facility]/064-A  MRN: [id number]  : 1933  (80 y.o.)  Referring Practitioner: Gloria Schaffer MD  Diagnosis: breast cancer metastasized to the brain, right  Additional Pertinent Hx: Olena Mariano is a 80 y.o. female who presents to the emergency department for evaluation of fall. Patient has had multiple falls over the past couple weeks, refused EMS transfer each time. Today patient had a fall, striking her right temple. Patient is on Plavix. 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 Only pain that she is complaining of is to her right temple. Per MRI: Heterogeneous 5.5 cm nodular peripherally enhancing cystic and solid mass centered at the right occipital/temporal lobe junction with prominent adjacent T2/FLAIR prolongation involving the corpus callosum, temporal, parietal and frontal lobes with prominent mass effect on the right lateral ventricle and 9 mm leftward midline shift. (High grade glioma, glioblastoma multiform). Pt is S/P R craniotomy on 21. Pt admitted to inpatient rehab unit on 2021    Restrictions/Precautions:  Restrictions/Precautions: General Precautions, Fall Risk  Position Activity Restriction  Other position/activity restrictions: L neglect, R artificial eye     SUBJECTIVE: Pt seated in bedside chair upon arrival, agreeable to OT session. PAIN: No c/o. Vitals: Vitals not assessed per clinical judgement, see nursing flowsheet    COGNITION: Decreased Recall, Decreased Insight, Decreased Problem Solving and Decreased Safety Awareness    ADL:   Feeding: with set-up. For lunch tray. Grooming: Stand By Assistance. Standing sink side washing hands.  Set-up washing face while seated in bedside chair. Bathing: Stand By Assistance. SBA washing dc area/bottom standing at 4WW with 1-2 UE release. Supervision for UB care while seated in chair. Upper Extremity Dressing: with set-up. Likely/donning tshirt overhead while seated. Lower Extremity Dressing: Stand By Assistance. Likely/donning socks and shoes onto B feet while seated. Threading BLE into undergarment/pants while seated and managing over hips in standing. Toileting: Stand By Assistance. SBA for clothing management. Pt completed hygiene while seated after BM. Toilet Transfer: Stand By Assistance. STS- use of grab bar as assist.    BALANCE:  Sitting Balance:  Supervision, Stand By Assistance. Bedside chair  Standing Balance: Stand By Assistance, [employer]. x1-2 minutes within ADLs. BED MOBILITY:  Not Tested    TRANSFERS:  Sit to Stand:  Stand By Assistance. Stand to Sit: Stand By Assistance. Comment: To/from various surfaces- mod cues for walker brakes. FUNCTIONAL MOBILITY:  Assistive Device: 4 Wheeled Walker   Assist Level:  Stand By Assistance and Contact Guard Assistance. Distance:   Completed functional mobility to/from BR x2 trials and ~20ft within unit hallway at slow pace, no LOB noted. Pt requires min cues for walker safety to keep close to QUIRINO- seated rest break after trial of mobility, min fatigue noted. ADDITIONAL ACTIVITIES:  Patient identified a personal goal to increase UB strength and improve overall endurance so they can complete their toilet & shower transfers; skilled edu on UE strengthening. Completed BUE AROM exercises x10 reps x1 set in all joints/planes to increase strength and endurance required for ADLs. Pt required min rest break between each exercise and min v/c for proper technique. ASSESSMENT:     Activity Tolerance:  Patient tolerance of  treatment: fair.        Discharge Recommendations: Patient would benefit from continued therapy after discharge, Continue to assess pending progress, Home with Home health OT   Equipment Recommendations: Other: 4 wheeled walker  Plan: Times per week: 5x/wk for 90 min & 1x/wk for 30 min  Current Treatment Recommendations: Strengthening, Balance Training, Neuromuscular Re-education, Functional Mobility Training, Endurance Training, Home Management Training, Patient/Caregiver Education & Training, Self-Care / ADL, Safety Education & Training  Plan Comment: Pt demonstrates decline from baseline, requiring increased assistance due to decreased endurance and vision. Pt will benefit from skilled OT services to improve functional mobility and self care ADl routine. Patient Education  Patient Education: ADL's, Home Exercise Program, Importance of Increasing Activity, Assistive Device Safety and Saety with transfers and mobility. Goals  Short term goals  Time Frame for Short term goals: 2 weeks  Short term goal 1: Pt will complete visual scanning to L side with min cues during ADLs to decrease fall risk during ADLs. Short term goal 2: Pt will complete LB ADL with set up and adaptive techs to increase indep with self care. Short term goal 3: Pt will complete dynamic standing task with B hand release and SBA x 4 min to increase balance required for ADL completion. Short term goal 4: Pt will complete functional mobility to/from BR with AD and SBA to increase indep with self care. Long term goals  Time Frame for Long term goals : 4 weeks  Long term goal 1: Pt will complete light ADL MOD I to improve indep in preparing a cup of coffee. Long term goal 2: Pt will complete ADL routine including shower transfer with S and adaptive techs to increase indep with self care. Long term goal 3: Pt will complete dynamic standing task with B hand release and S x 10 min to increase balance required for ADL completion.   Long term goal 4: Pt will complete functional mobility to/from BR with AD and MOD I to increase indep with self care.  Long term goal 5: Pt will complete toileting routine including transfer with S to increase indep. Following session, patient left in safe position with all fall risk precautions in place.

## 2021-07-14 NOTE — PROGRESS NOTES
Physical Medicine & Rehabilitation   Progress Note    Chief Complaint:  Brain mass. Rehab needs    Subjective:  Patient seen on rounds in her room . Was working talking with two visitors. She denied headache, SOB, chest pain, nausea, and vomiting. Stated she slept well last night and that her bowels have been moving. She had no new concerns today. Has been tolerating her therapies well. Rehabilitation:  PT:     Restrictions/Precautions:  Restrictions/Precautions: General Precautions, Fall Risk  Position Activity Restriction  Other position/activity restrictions: L neglect, R artificial eye      SUBJECTIVE: Patient seated in bedside chair upon arrival. Patient pleasant and agreeable to therapy.     PAIN: 0/10:      Vitals: Vitals not assessed per clinical judgement, see nursing flowsheet     OBJECTIVE:  Bed Mobility:  Not Tested     Transfers:  Sit to Stand: Stand By Assistance, cues for hand placement  Stand to Sit:Stand By Assistance, cues for hand placement     Ambulation:  Stand By Assistance, with increased time for completion  Distance: 150'x1  Surface: Level Tile  Device:4 Wheeled Walker  Gait Deviations: Forward Flexed Posture, Slow Gillian, Decreased Step Length Bilaterally, Decreased Gait Speed, Decreased Heel Strike Bilaterally, Mild Path Deviations and Path deviations due to distractions while ambulating.     Stairs:  Stairs:  4\" steps. X 5 using Bilateral Handrails and Contact Guard Assistance, with increased time for completion.        Exercise:  Patient was guided in 1 set(s) 10 reps of exercise to both lower extremities. Seated marches, Seated heel/toe raises and Long arc quads. Exercises were completed for increased independence with functional mobility.     Functional Outcome Measures: Not completed     ASSESSMENT:  Assessment: Patient progressing toward established goals. Activity Tolerance:  Patient tolerance of  treatment: good.       Equipment Recommendations:Equipment Needed: Yes  Other: Pt may need a w/c. Has 4 [employer], lift chair and hospital bed  Discharge Recommendations:  Continue to assess pending progress     OT:     Restrictions/Precautions:  Restrictions/Precautions: General Precautions, Fall Risk  Position Activity Restriction  Other position/activity restrictions: L neglect, R artificial eye      SUBJECTIVE: Pt seated in bedside chair upon arrival, agreeable to OT session.     PAIN: No c/o.     Vitals: Vitals not assessed per clinical judgement, see nursing flowsheet     COGNITION: Decreased Recall, Decreased Insight, Decreased Problem Solving and Decreased Safety Awareness     ADL:   Feeding: with set-up. For lunch tray. Grooming: Stand By Assistance. Standing sink side washing hands. Set-up washing face while seated in bedside chair. Bathing: Stand By Assistance. SBA washing dc area/bottom standing at 4WW with 1-2 UE release. Supervision for UB care while seated in chair. Upper Extremity Dressing: with set-up. [place]/donning tshirt overhead while seated. Lower Extremity Dressing: Stand By Assistance. [place]/donning socks and shoes onto B feet while seated. Threading BLE into undergarment/pants while seated and managing over hips in standing. Toileting: Stand By Assistance. SBA for clothing management. Pt completed hygiene while seated after BM. Toilet Transfer: Stand By Assistance. STS- use of grab bar as assist.     BALANCE:  Sitting Balance:  Supervision, Stand By Assistance. Bedside chair  Standing Balance: Stand By Assistance, [address] x1-2 minutes within ADLs.    BED MOBILITY:  Not Tested     TRANSFERS:  Sit to Stand:  Stand By Assistance. Stand to Sit: Stand By Assistance. Comment: To/from various surfaces- mod cues for walker brakes.     FUNCTIONAL MOBILITY:  Assistive Device: 4 Wheeled Walker   Assist Level:  Stand By Assistance and [address]    Distance:   Completed functional mobility to/from BR x2 trials and ~20ft within unit hallway at slow pace, no LOB noted. Pt requires min cues for walker safety to keep close to QUIRINO- seated rest break after trial of mobility, min fatigue noted.      ADDITIONAL ACTIVITIES:  Patient identified a personal goal to increase UB strength and improve overall endurance so they can complete their toilet & shower transfers; skilled edu on UE strengthening. Completed BUE AROM exercises x10 reps x1 set in all joints/planes to increase strength and endurance required for ADLs. Pt required min rest break between each exercise and min v/c for proper technique.     ASSESSMENT:  Activity Tolerance:  Patient tolerance of  treatment: fair. Discharge Recommendations: Patient would benefit from continued therapy after discharge, Continue to assess pending progress, Home with Home health OT   Equipment Recommendations: Other: 4 wheeled walker  Plan: Times per week: 5x/wk for 90 min & 1x/wk for 30 min  Current Treatment Recommendations: Strengthening, Balance Training, Neuromuscular Re-education, Functional Mobility Training, Endurance Training, Home Management Training, Patient/Caregiver Education & Training, Self-Care / ADL, Safety Education & Training  Plan Comment: Pt demonstrates decline from baseline, requiring increased assistance due to decreased endurance and vision. Pt will benefit from skilled OT services to improve functional mobility and self care ADl routine.     ST:     Diagnosis: S/p craniotomy   Secondary Diagnosis: Dysphagia, cognitive linguistic deficits  Precautions: Fall risk, aspiration precautions   Current Diet:  Regular, thin liquids   Swallowing Strategies: Full Upright Position, Small Bite/Sip and Alternate Solids and Liquids     Date of Last MBS/FEES: Not Applicable     Pain:  No pain reported.     Subjective:  Patient seen sitting upright in recliner upon ST arrival. Patient pleasant, alert, and cooperative throughout ST session.  No family present.     Short-Term Goals:  SHORT TERM GOAL #1:  Goal 1: Pt will consume a regular diet and thin liquids without overt s/s of aspiration to meet nutritional/hydration measures safely. INTERVENTIONS: DNT due to focus on additional STGs.     SHORT TERM GOAL #2:  Goal 2: Patient will complete functional immediate/delayed/prospective recall tasks (inclusion of compensatory strategies) with 80% accuracy, min cues to improve retention of pertinent information. INTERVENTIONS:   Delayed Recall of ST Information from Previous Session (Four Units): 3/4 independent, 1/4 with visual cuing     SHORT TERM GOAL #3:  Goal 3: Pt will complete problem solving, verbal/visual reasoning, and executive functioning tasks (safety time, basic money/math comprehensive of preset up pill box) with 70% accuracy, mod cues to improve contribution within ADLs/IADLs. INTERVENTIONS:   Medication Management with Pill Box  Medication 1: Patient with need for at least moderate cuing to complete task with correct dosage with an additional maximum cuing to review pill box for completion   Medication 2: Patient with need for at least moderate cuing to complete task with perseveration on previous medication dosage recommendations and inconsistent use of pill box. *Patient with multiple errors with need for ST identification and request for patient to correct  *Patient with at least moderate cuing needed to complete task progression/sequencing (e.g., open/close compartments, initiate task with Sunday-Saturday, etc.)  *Significantly increased time needed to complete task (>20 minutes)  *Patient with POOR completion of task with barriers to completion including: physical limitations, visual impairment, etc.     SHORT TERM GOAL #4:  Goal 4: Pt will complete sequencing and thought organization tasks (i.e. divergent naming, multi step commands, complex yes/no questions) with 70% accuracy, mod cues to improve mental flexibility and processing speed.    INTERVENTIONS: DNT due to focus on additional STGs.     SHORT TERM GOAL #5:  Goal 5: Pt will complete functional attention tasks (i.e. sustained, selective, alternating) with no more than x3-4 errors/redirections within task completion for successful management of ADL's post discharge. INTERVENTIONS: Adequate sustained attention for entirety of 30 minute therapy session with no re-directions required.      Long-Term Goals:  Timeframe for Long-term Goals: 2 weeks:  Goal 1: Pt will improve cognitive-linguistic skills to a supervision level of assist for safe, functional return to home setting with family support post discharge. ONGOING   Goal 2: Pt will safely consume regular diet+thin liquids demonstrating independent carryover of trained compensatory swallowing strategies and without overt s/s aspiration or pulmonary compromise to assist with nutrition/hydration measures. ONGOING      Comprehension: 3 - Patient understands basic needs 50-74% of the time  Expression: 4 - Expresses basic ideas/needs 75-90%+ of the time  Social Interaction: 4 - Patient appropriate 75-90%+ of the time  Problem Solvin - Patient solves simple/routine tasks 25%-49%  Memory: 3 - Patient remembers 50%-74% of the time       EDUCATION:  Learner: Patient  Education:  Reviewed ST goals and Plan of Care and Reviewed recommendations for follow-up  Evaluation of Education: Demonstrates with assistance, Needs further instruction and Family not present     ASSESSMENT/PLAN:  Activity Tolerance:  Patient tolerance of  treatment: good. Cooperative with ST and POC. Assessment/Plan: Patient progressing toward established goals. Continues to require skilled care of licensed speech pathologist to progress toward achievement of established goals and plan of care. .     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), cataracts left  HEENT:  s/p craniotomy  RESPIRATORY:  negative  CARDIOVASCULAR:  negative  GASTROINTESTINAL:  Negative  GENITOURINARY:  garcia  SKIN:  Bruising, right facial  HEMATOLOGIC/LYMPHATIC:  positive for swelling/edema  MUSCULOSKELETAL:  positive for  muscle weakness  NEUROLOGICAL:  positive for gait problems and weakness  BEHAVIOR/PSYCH:  negative  System review otherwise negative      Objective:  Vitals:    07/14/21 0843   BP: (!) 120/56   Pulse: 66   Resp: 16   Temp: 98.3 °F (36.8 °C)   SpO2: 96%   awake  Orientation:   person, place, time  Mood: within normal limits  Affect: calm  General appearance: Patient is well nourished, well developed, well groomed and in no acute distress     Memory:   normal,   Attention/Concentration: normal  Language:  normal     Cranial Nerves:  cranial nerves II-XII are grossly intact except chronic vision issues with right eye blindness and left eye cataracts  ROM:  normal  Motor Exam:  Motor exam is symmetrical 4+ out of 5 all extremities bilaterally  Tone:  normal  Muscle bulk: within normal limits  Sensory:  Sensory intact     Skin: warm and dry, no rash or erythema  Peripheral vascular: Pulses: Normal upper and lower extremity pulses; Edema: trace    Diagnostics:   Recent Results (from the past 24 hour(s))   POCT glucose    Collection Time: 07/13/21  5:17 PM   Result Value Ref Range    POC Glucose 151 (H) 70 - 108 mg/dl   POCT glucose    Collection Time: 07/13/21  8:53 PM   Result Value Ref Range    POC Glucose 121 (H) 70 - 108 mg/dl   CBC    Collection Time: 07/14/21  6:26 AM   Result Value Ref Range    WBC 9.4 4.8 - 10.8 thou/mm3    RBC 3.71 (L) 4.20 - 5.40 mill/mm3    Hemoglobin 10.8 (L) 12.0 - 16.0 gm/dl    Hematocrit 36.3 (L) 37.0 - 47.0 %    MCV 97.8 81.0 - 99.0 fL    MCH 29.1 26.0 - 33.0 pg    MCHC 29.8 (L) 32.2 - 35.5 gm/dl    RDW-CV 14.5 11.5 - 14.5 %    RDW-SD 52.4 (H) 35.0 - 45.0 fL    Platelets 684 882 - 309 thou/mm3    MPV 12.1 9.4 - 12.4 fL   POCT glucose Collection Time: 07/14/21  7:59 AM   Result Value Ref Range    POC Glucose 104 70 - 108 mg/dl   POCT glucose    Collection Time: 07/14/21 12:29 PM   Result Value Ref Range    POC Glucose 80 70 - 108 mg/dl       Impression:  · Right brain mass - metastatic from breast  · Fall  · CHI with facial bruising  · Subarachnoid and parenchymal hemorrhage  · Traumatic ecchymosis to left lower leg and left forearm, right forehead ecchymosis  · Bilateral lower extremity weakness  · Hypotension  · Anxiety  · GERD  · SVT  · HLD  · Hyperglycemia, steroid induced  · Hx Breast cancer with mets to the liver and brain    Plan:  Continue current therapies  Prophylaxis: DVT - SCDs/BARRY due to brain bleed, GI - Protonix  Pain: tylenol, lidoderm,   Bowels: Colace, fleets, MOM, miralax. Seizure: keppra  Anxiety: xanax, zoloft  Melatonin at night PRN  Ok for OTC spray for leg pain at night  Oncology decreasing decadron and plans En[facility]tu therapy once discharged  Patient asking about w/c, states passport was working on getting her one.    · Discharge planning as above      Missed Therapy Time:  · None    Marly Chin MD

## 2021-07-14 NOTE — PROGRESS NOTES
[address] [address]  INPATIENT SPEECH THERAPY  [address] [place]  DAILY NOTE    TIME   SLP Individual Minutes  Time In: 0459  Time Out: 1500  Minutes: 25  Timed Code Treatment Minutes: 25 Minutes        Date: 2021  Patient Name: Cesar Valdivia      CSN: [id number]   : 1933  (80 y.o.)  Gender: female   Referring Physician: Darinel Traylor MD  Diagnosis: S/p craniotomy   Secondary Diagnosis: Dysphagia, cognitive linguistic deficits  Precautions: Fall risk, aspiration precautions   Current Diet: Regular, thin liquids   Swallowing Strategies: Full Upright Position, Small Bite/Sip and Alternate Solids and Liquids     Date of Last MBS/FEES: Not Applicable    Pain:  No pain reported. Subjective:  Pt seen upright in recliner and speaking to brother on telephone. Required at least min cues to appropriately terminate conversation for completion of therapy. Pt alert and fully cooperative. No family present. Short-Term Goals:  SHORT TERM GOAL #1:  Goal 1: Pt will consume a regular diet and thin liquids without overt s/s of aspiration to meet nutritional/hydration measures safely. INTERVENTIONS: DNT due to focus on additional STGs. SHORT TERM GOAL #2:  Goal 2: Patient will complete functional immediate/delayed/prospective recall tasks (inclusion of compensatory strategies) with 80% accuracy, min cues to improve retention of pertinent information. INTERVENTIONS:   Delayed Recall of ST Information from Previous Session (Four Units):  ~48 hour delay:  indep/extra time     Recall of date: indep     Recall of daily events: indep carryover of exercises (legs + arms), stairs, ambulating to gym. Required extra time to recall bathing tasks. Poor recall of tasks within functional apartment.      Recall of ST name/department: 1/2 FO2, 1/2 poor success     SHORT TERM GOAL #3:  Goal 3: Pt will complete problem solving, verbal/visual reasoning, and executive functioning tasks (safety time, basic money/math comprehensive of preset up pill box) with 70% accuracy, mod cues to improve contribution within ADLs/IADLs. INTERVENTIONS: Functional problem solving/comprehension of already set up pill box:   Determining appropriate slot for current [place]: indep   Locating ALL tabs for Wednesday: required max assist  Determining next appropriate Wednesday tab according to current TOD: moderate assist   Determining mistakes made on pill box: 1/2 mod cues, 1/2 Total A   *severely poor success given decreased visual acuity, poor utilization of tactile skills/feedback and significantly poor mental flexibility with rigid thinking present     SHORT TERM GOAL #4:  Goal 4: Pt will complete sequencing and thought organization tasks (i.e. divergent naming, multi step commands, complex yes/no questions) with 70% accuracy, mod cues to improve mental flexibility and processing speed. INTERVENTIONS: DNT due to focus on additional STGs. SHORT TERM GOAL #5:  Goal 5: Pt will complete functional attention tasks (i.e. sustained, selective, alternating) with no more than x3-4 errors/redirections within task completion for successful management of ADL's post discharge. INTERVENTIONS: DNT d/t focus on other goals     Long-Term Goals:  Timeframe for Long-term Goals: 2 weeks:  Goal 1: Pt will improve cognitive-linguistic skills to a supervision level of assist for safe, functional return to home setting with family support post discharge. ONGOING   Goal 2: Pt will safely consume regular diet+thin liquids demonstrating independent carryover of trained compensatory swallowing strategies and without overt s/s aspiration or pulmonary compromise to assist with nutrition/hydration measures.  ONGOING     Comprehension: 3 - Patient understands basic needs 50-74% of the time  Expression: 4 - Expresses basic ideas/needs 75-90%+ of the time  Social Interaction: 4 - Patient appropriate 75-90%+ of the time  Problem Solvin - Patient solves simple/routine tasks 25%-49%  Memory: 3 - Patient remembers 50%-74% of the time      EDUCATION:  Learner: Patient  Education:  Reviewed ST goals and Plan of Care and Reviewed recommendations for follow-up  Evaluation of Education: Demonstrates with assistance, Needs further instruction and Family not present    ASSESSMENT/PLAN:  Activity Tolerance:  Patient tolerance of  treatment: good. Cooperative with ST and POC. Assessment/Plan: Patient progressing toward established goals. Continues to require skilled care of licensed speech pathologist to progress toward achievement of established goals and plan of care. .     Plan for Next Session: sequencing, thought organization, selective attention        Earnestine Amador M.S. [facility]-SLP [id number] 7/14/2021

## 2021-07-15 PROBLEM — N30.00 ACUTE CYSTITIS WITHOUT HEMATURIA: Status: ACTIVE | Noted: 2021-07-15

## 2021-07-15 PROBLEM — S50.12XA: Status: RESOLVED | Noted: 2021-06-28 | Resolved: 2021-07-15

## 2021-07-15 PROBLEM — S80.12XA TRAUMATIC ECCHYMOSIS OF LEFT LOWER LEG: Status: RESOLVED | Noted: 2021-06-28 | Resolved: 2021-07-15

## 2021-07-15 LAB
ERYTHROCYTE [DISTWIDTH] IN BLOOD BY AUTOMATED COUNT: 14.5 % (ref 11.5–14.5)
ERYTHROCYTE [DISTWIDTH] IN BLOOD BY AUTOMATED COUNT: 52.2 FL (ref 35–45)
GLUCOSE BLD-MCNC: 136 MG/DL (ref 70–108)
GLUCOSE BLD-MCNC: 142 MG/DL (ref 70–108)
GLUCOSE BLD-MCNC: 76 MG/DL (ref 70–108)
GLUCOSE BLD-MCNC: 92 MG/DL (ref 70–108)
HCT VFR BLD CALC: 36.3 % (ref 37–47)
HEMOGLOBIN: 11 GM/DL (ref 12–16)
MCH RBC QN AUTO: 29.8 PG (ref 26–33)
MCHC RBC AUTO-ENTMCNC: 30.3 GM/DL (ref 32.2–35.5)
MCV RBC AUTO: 98.4 FL (ref 81–99)
PLATELET # BLD: 130 THOU/MM3 (ref 130–400)
PMV BLD AUTO: 11.9 FL (ref 9.4–12.4)
RBC # BLD: 3.69 MILL/MM3 (ref 4.2–5.4)
WBC # BLD: 10.3 THOU/MM3 (ref 4.8–10.8)

## 2021-07-15 PROCEDURE — 97130 THER IVNTJ EA ADDL 15 MIN: CPT

## 2021-07-15 PROCEDURE — 85027 COMPLETE CBC AUTOMATED: CPT

## 2021-07-15 PROCEDURE — 6360000002 HC RX W HCPCS: Performed by: INTERNAL MEDICINE

## 2021-07-15 PROCEDURE — 82948 REAGENT STRIP/BLOOD GLUCOSE: CPT

## 2021-07-15 PROCEDURE — 97535 SELF CARE MNGMENT TRAINING: CPT

## 2021-07-15 PROCEDURE — 1180000000 HC REHAB R&B

## 2021-07-15 PROCEDURE — 36415 COLL VENOUS BLD VENIPUNCTURE: CPT

## 2021-07-15 PROCEDURE — 97530 THERAPEUTIC ACTIVITIES: CPT

## 2021-07-15 PROCEDURE — 97110 THERAPEUTIC EXERCISES: CPT

## 2021-07-15 PROCEDURE — 97116 GAIT TRAINING THERAPY: CPT

## 2021-07-15 PROCEDURE — 97129 THER IVNTJ 1ST 15 MIN: CPT

## 2021-07-15 PROCEDURE — 6370000000 HC RX 637 (ALT 250 FOR IP): Performed by: EMERGENCY MEDICINE

## 2021-07-15 PROCEDURE — 99232 SBSQ HOSP IP/OBS MODERATE 35: CPT | Performed by: NURSE PRACTITIONER

## 2021-07-15 PROCEDURE — 6370000000 HC RX 637 (ALT 250 FOR IP): Performed by: PHYSICAL MEDICINE & REHABILITATION

## 2021-07-15 RX ORDER — MUSCLE RUB CREAM 100; 150 MG/G; MG/G
CREAM TOPICAL PRN
Status: DISCONTINUED | OUTPATIENT
Start: 2021-07-15 | End: 2021-07-21 | Stop reason: HOSPADM

## 2021-07-15 RX ADMIN — LEVETIRACETAM 500 MG: 500 TABLET, FILM COATED ORAL at 21:02

## 2021-07-15 RX ADMIN — ALPRAZOLAM 0.25 MG: 0.25 TABLET ORAL at 14:32

## 2021-07-15 RX ADMIN — ATORVASTATIN CALCIUM 10 MG: 10 TABLET, FILM COATED ORAL at 21:02

## 2021-07-15 RX ADMIN — HYDRALAZINE HYDROCHLORIDE 25 MG: 25 TABLET ORAL at 06:23

## 2021-07-15 RX ADMIN — SERTRALINE 50 MG: 50 TABLET, FILM COATED ORAL at 21:02

## 2021-07-15 RX ADMIN — DOCUSATE SODIUM 100 MG: 100 CAPSULE ORAL at 09:44

## 2021-07-15 RX ADMIN — LEVOTHYROXINE SODIUM 100 MCG: 0.1 TABLET ORAL at 06:23

## 2021-07-15 RX ADMIN — NITROFURANTOIN MONOHYDRATE/MACROCRYSTALLINE 100 MG: 25; 75 CAPSULE ORAL at 09:44

## 2021-07-15 RX ADMIN — NITROFURANTOIN MONOHYDRATE/MACROCRYSTALLINE 100 MG: 25; 75 CAPSULE ORAL at 21:02

## 2021-07-15 RX ADMIN — ISOSORBIDE MONONITRATE 30 MG: 30 TABLET ORAL at 09:44

## 2021-07-15 RX ADMIN — ALPRAZOLAM 0.25 MG: 0.25 TABLET ORAL at 06:27

## 2021-07-15 RX ADMIN — ACETAMINOPHEN 650 MG: 325 TABLET ORAL at 09:44

## 2021-07-15 RX ADMIN — DOCUSATE SODIUM 100 MG: 100 CAPSULE ORAL at 21:02

## 2021-07-15 RX ADMIN — PANTOPRAZOLE SODIUM 40 MG: 40 TABLET, DELAYED RELEASE ORAL at 06:23

## 2021-07-15 RX ADMIN — HYDRALAZINE HYDROCHLORIDE 25 MG: 25 TABLET ORAL at 21:02

## 2021-07-15 RX ADMIN — INSULIN LISPRO 1 UNITS: 100 INJECTION, SOLUTION INTRAVENOUS; SUBCUTANEOUS at 18:22

## 2021-07-15 RX ADMIN — LEVETIRACETAM 500 MG: 500 TABLET, FILM COATED ORAL at 09:44

## 2021-07-15 RX ADMIN — SERTRALINE 50 MG: 50 TABLET, FILM COATED ORAL at 09:44

## 2021-07-15 RX ADMIN — DEXAMETHASONE 2 MG: 4 TABLET ORAL at 09:44

## 2021-07-15 RX ADMIN — HYDRALAZINE HYDROCHLORIDE 25 MG: 25 TABLET ORAL at 14:30

## 2021-07-15 RX ADMIN — DEXAMETHASONE 2 MG: 4 TABLET ORAL at 21:02

## 2021-07-15 ASSESSMENT — PAIN DESCRIPTION - PAIN TYPE: TYPE: ACUTE PAIN;CHRONIC PAIN;SURGICAL PAIN

## 2021-07-15 ASSESSMENT — PAIN DESCRIPTION - ONSET: ONSET: ON-GOING

## 2021-07-15 ASSESSMENT — PAIN DESCRIPTION - ORIENTATION: ORIENTATION: MID;POSTERIOR

## 2021-07-15 ASSESSMENT — PAIN SCALES - GENERAL
PAINLEVEL_OUTOF10: 10
PAINLEVEL_OUTOF10: 0
PAINLEVEL_OUTOF10: 0
PAINLEVEL_OUTOF10: 8

## 2021-07-15 ASSESSMENT — PAIN - FUNCTIONAL ASSESSMENT: PAIN_FUNCTIONAL_ASSESSMENT: ACTIVITIES ARE NOT PREVENTED

## 2021-07-15 ASSESSMENT — PAIN DESCRIPTION - PROGRESSION: CLINICAL_PROGRESSION: NOT CHANGED

## 2021-07-15 ASSESSMENT — PAIN DESCRIPTION - LOCATION: LOCATION: BACK

## 2021-07-15 ASSESSMENT — PAIN DESCRIPTION - DESCRIPTORS: DESCRIPTORS: ACHING;DISCOMFORT

## 2021-07-15 ASSESSMENT — PAIN DESCRIPTION - FREQUENCY: FREQUENCY: INTERMITTENT

## 2021-07-15 NOTE — PLAN OF CARE
Problem: Falls - Risk of:  Goal: Will remain free from falls  Description: Will remain free from falls  7/15/2021 0002 by Desmond Parks RN  Outcome: Ongoing  Uses the call light appropriately and waits for staff to assist.  7/14/2021 1440 by Padmini Vargas LPN  Outcome: Ongoing     Problem: Skin Integrity:  Goal: Will show no infection signs and symptoms  Description: Will show no infection signs and symptoms  7/15/2021 0002 by Desmond Parks RN  Outcome: Ongoing  7/14/2021 1440 by Padmini Vargas LPN  Outcome: Ongoing     Problem: Pain:  Goal: Pain level will decrease  Description: Pain level will decrease  7/15/2021 0002 by Desmond Parks RN  Outcome: Ongoing  No pain noted this shift. 7/14/2021 1440 by Padmini Vargas LPN  Outcome: Ongoing     Problem: Mobility - Impaired:  Goal: Mobility will improve  Description: Mobility will improve  7/15/2021 0002 by Desmond Parks RN  Outcome: Ongoing  Mobility improving each day with participation of therapy. 7/14/2021 1440 by Padmini Vargas LPN  Outcome: Ongoing     Problem: Infection - Surgical Site:  Goal: Will show no infection signs and symptoms  Description: Will show no infection signs and symptoms  7/15/2021 0002 by Desmond Parks RN  Outcome: Ongoing  No infection noted this shift. Afebrile.   7/14/2021 1440 by Padmini Vargas LPN  Outcome: Ongoing

## 2021-07-15 NOTE — PROGRESS NOTES
Clinical Pharmacy Note  Pharmacy Antimicrobial Monitoring    Current antibiotic(s): Nitrofurantoin (Macrobid)    Total days of antibiotics: 7    Indication/Diagnosis: ? UTI    Patient chart reviewed for signs/symptoms of infection: Yes      BP (!) 129/56   Pulse 57   Temp 97.9 °F (36.6 °C) (Oral)   Resp 18   Ht 5' (1.524 m)   Wt 138 lb 4.8 oz (62.7 kg)   LMP  (LMP Unknown)   SpO2 98%   BMI 27.01 kg/m²   Lab Results   Component Value Date    WBC 10.3 07/15/2021           Recommended stop date: currently appropriate    Prescriber contacted: No    Recommendations accepted: n/a

## 2021-07-15 NOTE — PROGRESS NOTES
[address]  Occupational Therapy  Daily Note  Time:   Time In: 1100  Time Out: 1230  Timed Code Treatment Minutes: 90 Minutes  Minutes: 90          Date: 7/15/2021  Patient Name: Rupert Shore,   Gender: female      Room: [facility]/064-A  MRN: [id number]  : 1933  (80 y.o.)  Referring Practitioner: Camelia Gonzales MD  Diagnosis: breast cancer metastasized to the brain, right  Additional Pertinent Hx: Rupert Shore is a 80 y.o. female who presents to the emergency department for evaluation of fall. Patient has had multiple falls over the past couple weeks, refused EMS transfer each time. Today patient had a fall, striking her right temple. Patient is on Plavix. 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 Only pain that she is complaining of is to her right temple. Per MRI: Heterogeneous 5.5 cm nodular peripherally enhancing cystic and solid mass centered at the right occipital/temporal lobe junction with prominent adjacent T2/FLAIR prolongation involving the corpus callosum, temporal, parietal and frontal lobes with prominent mass effect on the right lateral ventricle and 9 mm leftward midline shift. (High grade glioma, glioblastoma multiform). Pt is S/P R craniotomy on 21. Pt admitted to inpatient rehab unit on 2021    Restrictions/Precautions:  Restrictions/Precautions: General Precautions, Fall Risk  Position Activity Restriction  Other position/activity restrictions: L neglect, R artificial eye     SUBJECTIVE: Patient seated in bedside chair upon arrival; agreeable to therapy this date.   Patient pleasant and cooperative throughout session    PAIN: 0/10:     Vitals: Vitals not assessed per clinical judgement, see nursing flowsheet    COGNITION: Decreased Recall, Decreased Insight, Decreased Problem Solving, Decreased Safety Awareness and Difficulty Following Commands    ADL:   Grooming: Stand By Assistance. wash face seated  Bathing: Stand By Assistance. increased time  Upper Extremity Dressing: Stand By Assistance. verbal cues with clothing items retrieval for transportation  Lower Extremity Dressing: Stand By Assistance. doff/colby B socks/pants/brief seated  Toileting: Stand By Assistance. increased time for thoroughness standard toilet seated  Toilet Transfer: Stand By Assistance. standard toilet  Tub Transfer: [employer]. verbal cues for techique to utilize GB to shower chair; incresaed time. BALANCE:  Sitting Balance:  Supervision. bedside chair  Standing Balance: Stand By Assistance. 4WW, no LOB    BED MOBILITY:  Not Tested    TRANSFERS:  Sit to Stand:  Stand By Assistance. bedsdie chair to RW  Stand to Sit: Stand By Assistance. RW to bedside chair    FUNCTIONAL MOBILITY:  Assistive Device: Rolling Walker  Assist Level:  Stand By Assistance. Distance: To and from shower room and ~80 feet into unit during scavenger hunt activity  Steady pace, no LOB     ADDITIONAL ACTIVITIES:  Patient completed BUE strengthening exercises with skilled education on HEP: completed x15 reps x1 set with a minimal resistance band in all joints and all planes in order to improve UE strength and activity tolerance required for BADL routine and toilet / shower transfers. Patient tolerated good, requiring minimal rest breaks. Patient also required minimal verbal/visual cues for technique. Patient completed scanning activity in order to navigate within unit in order to locate elevators requiring min verbal cues initially to recall task direction as well as assist with fully scanning to L side to locate elevators as well as return to patient's room requiring increased time. ASSESSMENT:     Activity Tolerance:  Patient tolerance of  treatment: good.        Discharge Recommendations: Patient would benefit from continued therapy after discharge, Continue to assess pending progress, Home with Home health OT   Equipment Recommendations: Other: 4 wheeled walker  Plan: Times per week: 5x/wk for 90 min & 1x/wk for 30 min  Current Treatment Recommendations: Strengthening, Balance Training, Neuromuscular Re-education, Functional Mobility Training, Endurance Training, Home Management Training, Patient/Caregiver Education & Training, Self-Care / ADL, Safety Education & Training  Plan Comment: Pt demonstrates decline from baseline, requiring increased assistance due to decreased endurance and vision. Pt will benefit from skilled OT services to improve functional mobility and self care ADl routine. Patient Education  Patient Education: ADL's, IADL's, Home Safety and Importance of Increasing Activity    Goals  Short term goals  Time Frame for Short term goals: 2 weeks  Short term goal 1: Pt will complete visual scanning to L side with min cues during ADLs to decrease fall risk during ADLs. Short term goal 2: Pt will complete LB ADL with set up and adaptive techs to increase indep with self care. Short term goal 3: Pt will complete dynamic standing task with B hand release and SBA x 4 min to increase balance required for ADL completion. Short term goal 4: Pt will complete functional mobility to/from BR with AD and SBA to increase indep with self care. Long term goals  Time Frame for Long term goals : 4 weeks  Long term goal 1: Pt will complete light ADL MOD I to improve indep in preparing a cup of coffee. Long term goal 2: Pt will complete ADL routine including shower transfer with S and adaptive techs to increase indep with self care. Long term goal 3: Pt will complete dynamic standing task with B hand release and S x 10 min to increase balance required for ADL completion. Long term goal 4: Pt will complete functional mobility to/from BR with AD and MOD I to increase indep with self care. Long term goal 5: Pt will complete toileting routine including transfer with S to increase indep.     Following session, patient left in safe position with all fall risk precautions in place.

## 2021-07-15 NOTE — PROGRESS NOTES
Family Medicine Progress Notes/Coverage    Today's Date: 7/15/21  7E-64/064-A  Medical Record # [id number]  CSN/Account # [de-identified]      Ms. Rod admitted on 7/8/2021        Subjective / Interval History :     doing well, No SOB, No chest pain , No fatigue. No nausea nor abdominal pain    Reviewed notes, consults, laboratory and radiology results,    Objective:       Physical Exam:  Patient Vitals for the past 24 hrs:   BP Temp Temp src Pulse Resp SpO2   07/15/21 0623 (!) 164/77 -- -- -- -- --   07/14/21 2000 (!) 113/56 96.9 °F (36.1 °C) Oral 63 16 95 %   07/14/21 0843 (!) 120/56 98.3 °F (36.8 °C) -- 66 16 96 %       General Appearance:  Alert, cooperative, no distress, appears stated age   HEENT:  Neck:      Chest/Lungs:  Heart: CTA  RRR   Abdomen:  Back: soft   Extremities:  Neurological Exam: No edema  WNL       Assessment:     Admitting Diagnosis:    Breast cancer metastasized to brain, right ([facility] [C50.911, C79.31]    Active Hospital Problems    Diagnosis Date Noted    Impaired functional mobility, balance, gait, and endurance [Z74.09] 07/08/2021     Priority: High    Breast cancer metastasized to brain, right ([address] [C50.911, C79.31] 07/06/2021     Priority: High    Status post craniotomy [Z98.890] 07/02/2021     Priority: High    Subarachnoid hemorrhage following injury, no loss of consciousness ([facility] [address] [S06.6X0A] 06/28/2021     Priority: High    Fall [W19. XXXA] 06/28/2021     Priority: High    Acute cystitis without hematuria [N30.00] 07/15/2021     Priority: Medium    Fall at home, initial encounter [W19. 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]      Priority: Medium    Hypothyroidism due to acquired atrophy of thyroid [E03.4] 03/03/2016     Priority: Medium    Temporal arteritis ([address] [M31.6]      Priority: Medium    GERD (gastroesophageal reflux disease) [K21.9]      Priority: Medium       Plan:     Discussed plans with the nursing staff  Continue rehab    Medications, Laboratories and Imaging results:    Scheduled Meds:   dexamethasone  2 mg Oral BID    nitrofurantoin (macrocrystal-monohydrate)  100 mg Oral 2 times per day    lidocaine  2 patch Topical Daily    polyethylene glycol  17 g Oral Daily    atorvastatin  10 mg Oral Nightly    docusate sodium  100 mg Oral BID    hydrALAZINE  25 mg Oral 3 times per day    insulin lispro  0-6 Units Subcutaneous TID WC    insulin lispro  0-3 Units Subcutaneous Nightly    isosorbide mononitrate  30 mg Oral Daily    levETIRAcetam  500 mg Oral BID    levothyroxine  100 mcg Oral Daily    pantoprazole  40 mg Oral QAM AC    sertraline  50 mg Oral BID     Continuous Infusions:   dextrose       PRN Meds:melatonin, acetaminophen, magnesium hydroxide, ondansetron **OR** [DISCONTINUED] ondansetron, fleet, ALPRAZolam, dextrose, dextrose, glucagon (rDNA), glucose    Imaging:    Lab Review :    Lab Results   Component Value Date    WBC 10.3 07/15/2021    HGB 11.0 (L) 07/15/2021    HCT 36.3 (L) 07/15/2021    MCV 98.4 07/15/2021     07/15/2021     Lab Results   Component Value Date    CREATININE 0.5 07/09/2021    BUN 28 (H) 07/09/2021     07/09/2021    K 4.8 07/09/2021     07/09/2021    CO2 30 07/09/2021     Lab Results   Component Value Date    CKTOTAL 70 06/28/2021     Lab Results   Component Value Date    ALT 81 (H) 07/06/2021    AST 78 (H) 07/06/2021    ALKPHOS 53 07/06/2021    BILITOT 0.5 07/06/2021     Lab Results   Component Value Date    LIPASE 12.6 08/12/2020         Electronically signed by Zuhair Castro MD on 7/15/21 at 7:04 AM AMELIA Bolanos M.D.

## 2021-07-15 NOTE — PROGRESS NOTES
[facility]  INPATIENT PHYSICAL THERAPY  DAILY NOTE  [facility] - 7E-64/064-A    Time In: 0830  Time Out: 930  Timed Code Treatment Minutes: 60 Minutes  Minutes: 60          Date: 7/15/2021  Patient Name: Orestes Vaca,  Gender:  female        MRN: [id number]  : 1933  (80 y.o.)  Referral Date : 21  Referring Practitioner: Opal Flores MD  Diagnosis: Breast Cancer Metastisized to brain, right  Additional Pertinent Hx: Per H&P:Latanya Rod is a 80 y.o. female admitted to [facility] on 2021. 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 ([address] Carotid stenosis, Colostomy in place [facility]), Degenerative arthritis of cervical spine, GERD (gastroesophageal reflux disease), Hypercholesteremia, Hypoestrogenism, Hypothyroidism, Lumbago, Lumbosacral spinal stenosis, MDRO (multiple drug resistant organisms) resistance, Personal history of cardiac dysrhythmia, Sigmoid diverticulosis, Spondylolisthesis of lumbosacral region, Steroid-induced hyperglycemia, Subclavian artery stenosis ([address] Superior and Inferior Pubic ramus fracture, right, closed, initial encounter ([address] SVT (supraventricular tachycardia) ([address] Temporal arteritis ([address] Vertebral artery occlusion, and Vitamin D deficiency. Patient presented to [facility] ER after suffering a fall at home. Patient was attempting to ambulate when her legs gave out and she hit her nightstand. No LOC. She was too weak to get up. Her [facility] aide arrived and found her on the floor and called EMS. Patient underwent CT head and found to have right side brain mass as well as small subarachnoid hemorrhage. She is s/p debulking of the brain mass per Dr. Brady Valle 2021; Biopsy was c/w metastatic disease from her breast cancer.      Prior Level of Function:  Lives With: Alone  Type of Home: House  Home Layout: One level  Home Access: Level vision. At times pt attempting to amb with brakes engaged on 4ww- pt able to self correct after amb ~2ft  -Pt completed cone weaving with 4ww through cones. Pt needing cues for walker proximity and to keep feet within frame. Pt min unsteady, VERY slow moving, extra time and cues needed to complete. No LOB    Balance:  Dynamic Standing Balance: Stand By Assistance, [employer], with cues for safety, with verbal cues , with increased time for completion  Pt completed cone hnt task, pt instructed to locate and retrieve 10 cones, pt needing assist to locate 7/10 cones. Pt needing much extra time for task (45 mins to complete) pt needing many cues for 4ww safety and to keep it close to body and keep feet within RW frame. Pt unstedy on feet at times, pt needing cues for safety    Stairs:  Minimal Assistance, with cues for safety, with verbal cues , with increased time for completion  Number of Steps: 1  Height: 6\" step with [address] for technique, how to lift walker safely and for brakes      Functional Outcome Measures: Completed       ASSESSMENT:  Assessment: Patient progressing toward established goals. Activity Tolerance:  Patient tolerance of  treatment: good. Pt demos decreased cognition and problem solving, pt needing cues for safety throughout session     Equipment Recommendations:Equipment Needed: Yes  Other: Pt may need a w/c.   Has 4 [employer], lift chair and hospital bed  Discharge Recommendations:  Continue to assess pending progress    Plan: Times per week: 5x/wk 90min, 1x/wk 30min  Times per day: Daily  Current Treatment Recommendations: Strengthening, Transfer Training, Endurance Training, Neuromuscular Re-education, Patient/Caregiver Education & Training, Balance Training, Gait Training, Wheelchair Mobility Training, Home Exercise Program, Functional Mobility Training, Stair training, Safety Education & Training    Patient Education  Patient Education: Plan of Care, Transfers, Gait, Stairs, Verbal Exercise Instruction, AD safety    Goals:  Patient goals : go home  Short term goals  Time Frame for Short term goals: 1 week  Short term goal 1: Patient will complete supine < > sit with modified independence to transfer in/out of bed safely. GOAL MET  Short term goal 2: Patient will complete sit < > stand with SBA and verbal cues to less than or equal to 50% of the time to stand to ambulate with decreased difficulty. Short term goal 3: Patient will ambulate 76' with a RW and CGA to progress towards ambulating household distances safely. GOAL MET, SEE LTG  Short term goal 4: Patient will propel wheelchair 100' with SBA to increase independence and mobility. Long term goals  Time Frame for Long term goals : 4 weeks  Long term goal 1: Patient will complete sit < > stand and stand pivot transfers with modified independence to transfer surface to surface safely. Long term goal 2: Patient will ambulate 80' with a RW and supervision to navigate home with decreased difficulty. Long term goal 3: Patient will improve tinetti to greater than or equal to 19/28 to reduce her risk for falls. Long term goal 4: Patient will ascend/descend 1 step with a RW and CGA for community entry. Long term goal 5: Patient will propel wheelchair 100' with modified independence to navigate living envionment/community safely    Following session, patient left in safe position with all fall risk precautions in place.

## 2021-07-15 NOTE — PLAN OF CARE
Problem: Falls - Risk of:  Goal: Will remain free from falls  Description: Will remain free from falls  Outcome: Ongoing  Pt will remain free of falls. Pt is 1 assist with walker and gait belt, pt can be unsteady when walking, pt is blind in R eye and has difficulty at times with balance. Problem: Skin Integrity:  Goal: Will show no infection signs and symptoms  Description: Will show no infection signs and symptoms  Outcome: Ongoing  Skin assessment every shift. No new areas of skin breakdown, pt does have scattered bruising, abrasions and skin tears are healing. Problem: Pain:  Goal: Pain level will decrease  Description: Pain level will decrease  Outcome: Ongoing  Pt complains of pain to head at incision site, pt complains of chronic pain to neck and shoulders, pt is given Tylenol, lidoderm patches,  and muscle rub is applied to neck, pt is using a rolled towel to neck to alleviate pain. Problem: Mobility - Impaired:  Goal: Mobility will improve  Description: Mobility will improve  Outcome: Ongoing  Pt will participate in PT daily as directed to increase mobility. Problem: Infection - Surgical Site:  Goal: Will show no infection signs and symptoms  Description: Will show no infection signs and symptoms  Outcome: Ongoing  Surgical site will be assessed every shift for warmth to site, redness, pain or drainage. No ss of infection noted, staples were removed per order, removed 31 staples, incision is approximated, pt tolerated very well. Problem: Serum Glucose Level - Abnormal:  Goal: Ability to maintain appropriate glucose levels has stabilized  Description: Ability to maintain appropriate glucose levels has stabilized  Outcome: Ongoing  Continuing to check chems AM/HS due to use of decadron.

## 2021-07-15 NOTE — PROGRESS NOTES
Physical Medicine & Rehabilitation   Progress Note    Chief Complaint:  Brain mass. Rehab needs    Subjective:  Patient seen up in her chair. Patient sleeping well. Denies pain at this time. States had a headache/neckache earlier but tylenol helpful. Discussed menthol cream PRN for neck pain. Patient open to this. Patient denies any other needs or concerns at this time. +BM 7/15/21    Rehabilitation:  PT:   Transfers:  Sit to Stand: Stand By Assistance, cues for hand placement  Stand to Sit:Stand By Assistance, cues for hand placement  Ambulation:  Stand By Assistance, with increased time for completion  Distance: 150'x1  Surface: Level Tile  Device:4 Wheeled Walker  Gait Deviations: Forward Flexed Posture, Slow Gillian, Decreased Step Length Bilaterally, Decreased Gait Speed, Decreased Heel Strike Bilaterally, Mild Path Deviations and Path deviations due to distractions while ambulating. Stairs:  Stairs:  4\" steps. X 5 using Bilateral Handrails and Contact Guard Assistance, with increased time for completion. Exercise:  Patient was guided in 1 set(s) 10 reps of exercise to both lower extremities. Seated marches, Seated heel/toe raises and Long arc quads. Exercises were completed for increased independence with functional mobility. OT:  COGNITION: Decreased Recall, Decreased Insight, Decreased Problem Solving and Decreased Safety Awareness  ADL:   Feeding: with set-up. For lunch tray. Grooming: Stand By Assistance. Standing sink side washing hands. Set-up washing face while seated in bedside chair. Bathing: Stand By Assistance. SBA washing dc area/bottom standing at 4WW with 1-2 UE release. Supervision for UB care while seated in chair. Upper Extremity Dressing: with set-up. [place]/donning tshirt overhead while seated. Lower Extremity Dressing: Stand By Assistance. [place]/donning socks and shoes onto B feet while seated.  Threading BLE into undergarment/pants while seated and managing over hips in standing. Toileting: Stand By Assistance. SBA for clothing management. Pt completed hygiene while seated after BM. Toilet Transfer: Stand By Assistance. STS- use of grab bar as assist.  BALANCE:  Sitting Balance:  Supervision, Stand By Assistance. Bedside chair  Standing Balance: Stand By Assistance, [employer]. x1-2 minutes within ADLs. TRANSFERS:  Sit to Stand:  Stand By Assistance. Stand to Sit: Stand By Assistance. Comment: To/from various surfaces- mod cues for walker brakes   FUNCTIONAL MOBILITY:  Assistive Device: 4 Wheeled Walker   Assist Level:  Stand By Assistance and [employer]. Distance:   Completed functional mobility to/from BR x2 trials and ~20ft within unit hallway at slow pace, no LOB noted. Pt requires min cues for walker safety to keep close to QUIRINO- seated rest break after trial of mobility, min fatigue noted. ADDITIONAL ACTIVITIES:  Patient identified a personal goal to increase UB strength and improve overall endurance so they can complete their toilet & shower transfers; skilled edu on UE strengthening. Completed BUE AROM exercises x10 reps x1 set in all joints/planes to increase strength and endurance required for ADLs. Pt required min rest break between each exercise and min v/c for proper technique. ST:   INTERVENTIONS:   Delayed Recall of ST Information from Previous Session (Four Units):  ~48 hour delay: 4/4 indep/extra time      Recall of date: indep      Recall of daily events: indep carryover of exercises (legs + arms), stairs, ambulating to gym. Required extra time to recall bathing tasks.  Poor recall of tasks within functional apartment.      Recall of ST name/department: 1/2 FO2, 1/2 poor success     INTERVENTIONS: Functional problem solving/comprehension of already set up pill box:   Determining appropriate slot for current [place]: indep   Locating ALL tabs for Wednesday: required max assist  Determining next appropriate Wednesday tab according to current TOD: moderate assist   Determining mistakes made on pill box: 1/2 mod cues, 1/2 Total A   *severely poor success given decreased visual acuity, poor utilization of tactile skills/feedback and significantly poor mental flexibility with rigid thinking present           Review of Systems:  CONSTITUTIONAL:  positive for  fatigue  EYES: blind right eye, cataracts left  HEENT:  negative  RESPIRATORY:  negative  CARDIOVASCULAR:  negative  GASTROINTESTINAL:  Negative  GENITOURINARY:  garcia  SKIN:  Bruising, right facial  HEMATOLOGIC/LYMPHATIC:  positive for swelling/edema  MUSCULOSKELETAL:  positive for  muscle weakness  NEUROLOGICAL:  positive for gait problems and weakness  BEHAVIOR/PSYCH:  negative  System review otherwise negative      Objective:  BP (!) 129/56   Pulse 57   Temp 97.9 °F (36.6 °C) (Oral)   Resp 18   Ht 5' (1.524 m)   Wt 138 lb 4.8 oz (62.7 kg)   LMP  (LMP Unknown)   SpO2 98%   BMI 27.01 kg/m²   awake  Orientation:   person, place, time  Mood: within normal limits  Affect: calm  General appearance: Patient is well nourished, well developed, well groomed and in no acute distress     Memory:   normal,   Attention/Concentration: normal  Language:  normal     Cranial Nerves:  cranial nerves II-XII are grossly intact except chronic vision issues with right eye false and left eye cataracts  ROM:  normal  Motor Exam:  Motor exam is symmetrical 4+ out of 5 all extremities bilaterally  Tone:  normal  Muscle bulk: within normal limits  Sensory:  Sensory intact     Skin: warm and dry, no rash or erythema.  Left hand bruising from recent blood draw  Peripheral vascular: Pulses: Normal upper and lower extremity pulses; Edema: trace    Diagnostics:   Recent Results (from the past 24 hour(s))   POCT glucose    Collection Time: 07/14/21 12:29 PM   Result Value Ref Range    POC Glucose 80 70 - 108 mg/dl   POCT glucose    Collection Time: 07/14/21  5:18 PM   Result Value Ref Range    POC Glucose 128 (H) 70 - 108 mg/dl   POCT glucose    Collection Time: 07/14/21  8:17 PM   Result Value Ref Range    POC Glucose 167 (H) 70 - 108 mg/dl   CBC    Collection Time: 07/15/21  5:57 AM   Result Value Ref Range    WBC 10.3 4.8 - 10.8 thou/mm3    RBC 3.69 (L) 4.20 - 5.40 mill/mm3    Hemoglobin 11.0 (L) 12.0 - 16.0 gm/dl    Hematocrit 36.3 (L) 37.0 - 47.0 %    MCV 98.4 81.0 - 99.0 fL    MCH 29.8 26.0 - 33.0 pg    MCHC 30.3 (L) 32.2 - 35.5 gm/dl    RDW-CV 14.5 11.5 - 14.5 %    RDW-SD 52.2 (H) 35.0 - 45.0 fL    Platelets 770 549 - 250 thou/mm3    MPV 11.9 9.4 - 12.4 fL   POCT glucose    Collection Time: 07/15/21  8:13 AM   Result Value Ref Range    POC Glucose 92 70 - 108 mg/dl       Impression:  · Right brain mass - metastatic from breast  · Fall  · CHI with facial bruising  · Subarachnoid and parenchymal hemorrhage  · Traumatic ecchymosis to left lower leg and left forearm, right forehead ecchymosis  · Bilateral lower extremity weakness  · Hypotension  · Anxiety  · GERD  · SVT  · HLD  · Hyperglycemia, steroid induced  · Hx Breast cancer with mets to the liver    Plan:  Continue current therapies  Prophylaxis: DVT - SCDs/BARRY due to brain bleed, GI - Protonix  Pain: tylenol, lidoderm,   Bowels: Colace, fleets, MOM, miralax.  +BM 7/10/21  Seizure: keppra  Anxiety: xanax, zoloft  Melatonin at night PRN  Ok for OTC spray for leg pain at night  Oncology decreasing decadron and plans [facility] therapy once discharged  Menthol cream to neck PRN  · Conference tuesdays       Missed Therapy Time:  · None    Annamaria Watson, APRN - CNP

## 2021-07-15 NOTE — PROGRESS NOTES
[facility]  INPATIENT PHYSICAL THERAPY  DAILY NOTE  [facility] - [id number]    Time In: 1330  Time Out: 1400  Timed Code Treatment Minutes: 30 Minutes  Minutes: 30          Date: 7/15/2021  Patient Name: Olena Mariano,  Gender:  female        MRN: [id number]  : 1933  (80 y.o.)  Referral Date : 21  Referring Practitioner: Gloria Schaffer MD  Diagnosis: Breast Cancer Metastisized to brain, right  Additional Pertinent Hx: Per H&P:Latanya Rod is a 80 y.o. female admitted to [facility] on 2021. 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 ([address] Carotid stenosis, Colostomy in place [facility]), Degenerative arthritis of cervical spine, GERD (gastroesophageal reflux disease), Hypercholesteremia, Hypoestrogenism, Hypothyroidism, Lumbago, Lumbosacral spinal stenosis, MDRO (multiple drug resistant organisms) resistance, Personal history of cardiac dysrhythmia, Sigmoid diverticulosis, Spondylolisthesis of lumbosacral region, Steroid-induced hyperglycemia, Subclavian artery stenosis ([address] Superior and Inferior Pubic ramus fracture, right, closed, initial encounter ([address] SVT (supraventricular tachycardia) ([address] Temporal arteritis ([address] Vertebral artery occlusion, and Vitamin D deficiency. Patient presented to [facility] ER after suffering a fall at home. Patient was attempting to ambulate when her legs gave out and she hit her nightstand. No LOC. She was too weak to get up. [facility] arrived and found her on the floor and called EMS. Patient underwent CT head and found to have right side brain mass as well as small subarachnoid hemorrhage. She is s/p debulking of the brain mass per Dr. Libertad Tobar 2021; Biopsy was c/w metastatic disease from her breast cancer.      Prior Level of Function:  Lives With: Alone  Type of Home: House  Home Layout: One level  Home Access: Level entry  [address]: 4 wheeled walker, Asa Held, [facility] chair, [address] bed   Bathroom Shower/Tub: Tub/Shower unit, Shower chair with back  Bathroom Toilet: Standard  Bathroom Equipment: Shower chair, 3-in-1 commode    Receives Help From: Personal care attendant (Monday-Friday 2 hours)  ADL Assistance: Needs assistance  Homemaking Assistance: Needs assistance  Homemaking Responsibilities: Yes  Ambulation Assistance: Independent  Transfer Assistance: Independent  Active : No  Additional Comments: 2 hours/day M-F has an aide that assists with ADLs/IADLs. nieces live nearby and able to help at times. Restrictions/Precautions:  Restrictions/Precautions: General Precautions, Fall Risk  Position Activity Restriction  Other position/activity restrictions: L neglect, R artificial eye     SUBJECTIVE: pt in recliner upon arrival and agrees to therapy. Pt pleasant and cooperative, confused at times, needing freq cues for direction- pt heard phone ringing- pt turned to look behind toilet for the phone. PAIN: no c/o pain    Vitals: Vitals not assessed per clinical judgement, see nursing flowsheet    OBJECTIVE:  Bed Mobility:  Not Tested    Transfers:  Sit to Stand: [address], with verbal cues  Stand to Sit:Contact [employer], cues for hand placement, with verbal cues  Reminders for hand placement, brakes and walker safety    Ambulation:  Contact Guard Assistance, Minimal Assistance, with cues for safety, with verbal cues , with increased time for completion  Distance: [id number] 15ftx1  Surface: Level Tile  Device:4 Wheeled Walker  Gait Deviations:   Forward Flexed Posture, Slow Gillian, Decreased Step Length Bilaterally, Decreased Gait Speed, Decreased Heel Strike Bilaterally, Mild Path Deviations, Unsteady Gait, Decreased Terminal Knee Extension and cues for walker proximity and to keep feet within frame    Balance:  Dynamic Standing Balance: Contact Guard Assistance, with cues for safety, with verbal cues   Pt needing cues for safety and walker management when completing functional tasks in bathroom. Pt stood at sink to wash hands and completed dyn sitting tasks. Exercise:  Patient was guided in 1 set(s) 10 reps of exercise to both lower extremities. Ankle pumps, Glut sets, Hip abduction/adduction, Seated marches, Long arc quads and Seated abduction/adduction. Exercises were completed for increased independence with functional mobility. Functional Outcome Measures: Completed       ASSESSMENT:  Assessment: Patient progressing toward established goals. Activity Tolerance:  Patient tolerance of  treatment: good. Equipment Recommendations:Equipment Needed: Yes  Other: Pt may need a w/c. Has [employer], lift chair and hospital bed  Discharge Recommendations:  Continue to assess pending progress    Plan: Times per week: 5x/wk 90min, 1x/wk 30min  Times per day: Daily  Current Treatment Recommendations: Strengthening, Transfer Training, Endurance Training, Neuromuscular Re-education, Patient/Caregiver Education & Training, Balance Training, Gait Training, Wheelchair Mobility Training, Home Exercise Program, Functional Mobility Training, Stair training, Safety Education & Training    Patient Education  Patient Education: Plan of Care, Transfers, Gait, Verbal Exercise Instruction, safety, use of AD    Goals:  Patient goals : go home  Short term goals  Time Frame for Short term goals: 1 week  Short term goal 1: Patient will complete supine < > sit with modified independence to transfer in/out of bed safely. GOAL MET  Short term goal 2: Patient will complete sit < > stand with SBA and verbal cues to less than or equal to 50% of the time to stand to ambulate with decreased difficulty. Short term goal 3: Patient will ambulate 76' with a RW and CGA to progress towards ambulating household distances safely.  GOAL MET, SEE LTG  Short term goal 4: Patient will propel wheelchair 100' with SBA to increase independence and mobility. Long term goals  Time Frame for Long term goals : 4 weeks  Long term goal 1: Patient will complete sit < > stand and stand pivot transfers with modified independence to transfer surface to surface safely. Long term goal 2: Patient will ambulate 80' with a RW and supervision to navigate home with decreased difficulty. Long term goal 3: Patient will improve tinetti to greater than or equal to 19/28 to reduce her risk for falls. Long term goal 4: Patient will ascend/descend 1 step with a RW and CGA for community entry. Long term goal 5: Patient will propel wheelchair 100' with modified independence to navigate living envionment/community safely    Following session, patient left in safe position with all fall risk precautions in place.

## 2021-07-16 LAB
ERYTHROCYTE [DISTWIDTH] IN BLOOD BY AUTOMATED COUNT: 14.4 % (ref 11.5–14.5)
ERYTHROCYTE [DISTWIDTH] IN BLOOD BY AUTOMATED COUNT: 51.8 FL (ref 35–45)
GLUCOSE BLD-MCNC: 117 MG/DL (ref 70–108)
GLUCOSE BLD-MCNC: 150 MG/DL (ref 70–108)
GLUCOSE BLD-MCNC: 73 MG/DL (ref 70–108)
GLUCOSE BLD-MCNC: 83 MG/DL (ref 70–108)
HCT VFR BLD CALC: 36.5 % (ref 37–47)
HEMOGLOBIN: 11.2 GM/DL (ref 12–16)
MCH RBC QN AUTO: 30.2 PG (ref 26–33)
MCHC RBC AUTO-ENTMCNC: 30.7 GM/DL (ref 32.2–35.5)
MCV RBC AUTO: 98.4 FL (ref 81–99)
PLATELET # BLD: 118 THOU/MM3 (ref 130–400)
PMV BLD AUTO: 11.9 FL (ref 9.4–12.4)
RBC # BLD: 3.71 MILL/MM3 (ref 4.2–5.4)
WBC # BLD: 9.4 THOU/MM3 (ref 4.8–10.8)

## 2021-07-16 PROCEDURE — 97530 THERAPEUTIC ACTIVITIES: CPT

## 2021-07-16 PROCEDURE — 85027 COMPLETE CBC AUTOMATED: CPT

## 2021-07-16 PROCEDURE — 97129 THER IVNTJ 1ST 15 MIN: CPT

## 2021-07-16 PROCEDURE — 36415 COLL VENOUS BLD VENIPUNCTURE: CPT

## 2021-07-16 PROCEDURE — 97110 THERAPEUTIC EXERCISES: CPT

## 2021-07-16 PROCEDURE — 6360000002 HC RX W HCPCS: Performed by: INTERNAL MEDICINE

## 2021-07-16 PROCEDURE — 1180000000 HC REHAB R&B

## 2021-07-16 PROCEDURE — 6370000000 HC RX 637 (ALT 250 FOR IP): Performed by: EMERGENCY MEDICINE

## 2021-07-16 PROCEDURE — 82948 REAGENT STRIP/BLOOD GLUCOSE: CPT

## 2021-07-16 PROCEDURE — 97130 THER IVNTJ EA ADDL 15 MIN: CPT

## 2021-07-16 PROCEDURE — 97535 SELF CARE MNGMENT TRAINING: CPT

## 2021-07-16 PROCEDURE — 6370000000 HC RX 637 (ALT 250 FOR IP): Performed by: PHYSICAL MEDICINE & REHABILITATION

## 2021-07-16 PROCEDURE — 99232 SBSQ HOSP IP/OBS MODERATE 35: CPT | Performed by: NURSE PRACTITIONER

## 2021-07-16 PROCEDURE — 97112 NEUROMUSCULAR REEDUCATION: CPT

## 2021-07-16 PROCEDURE — 6370000000 HC RX 637 (ALT 250 FOR IP): Performed by: NURSE PRACTITIONER

## 2021-07-16 PROCEDURE — 97116 GAIT TRAINING THERAPY: CPT

## 2021-07-16 RX ADMIN — LEVETIRACETAM 500 MG: 500 TABLET, FILM COATED ORAL at 21:07

## 2021-07-16 RX ADMIN — ACETAMINOPHEN 650 MG: 325 TABLET ORAL at 09:54

## 2021-07-16 RX ADMIN — DEXAMETHASONE 2 MG: 4 TABLET ORAL at 21:08

## 2021-07-16 RX ADMIN — ALPRAZOLAM 0.25 MG: 0.25 TABLET ORAL at 09:55

## 2021-07-16 RX ADMIN — HYDRALAZINE HYDROCHLORIDE 25 MG: 25 TABLET ORAL at 06:29

## 2021-07-16 RX ADMIN — SERTRALINE 50 MG: 50 TABLET, FILM COATED ORAL at 09:54

## 2021-07-16 RX ADMIN — NITROFURANTOIN MONOHYDRATE/MACROCRYSTALLINE 100 MG: 25; 75 CAPSULE ORAL at 09:54

## 2021-07-16 RX ADMIN — DOCUSATE SODIUM 100 MG: 100 CAPSULE ORAL at 09:54

## 2021-07-16 RX ADMIN — HYDRALAZINE HYDROCHLORIDE 25 MG: 25 TABLET ORAL at 21:07

## 2021-07-16 RX ADMIN — PANTOPRAZOLE SODIUM 40 MG: 40 TABLET, DELAYED RELEASE ORAL at 06:27

## 2021-07-16 RX ADMIN — SERTRALINE 50 MG: 50 TABLET, FILM COATED ORAL at 21:07

## 2021-07-16 RX ADMIN — LEVETIRACETAM 500 MG: 500 TABLET, FILM COATED ORAL at 09:54

## 2021-07-16 RX ADMIN — DEXAMETHASONE 2 MG: 4 TABLET ORAL at 09:53

## 2021-07-16 RX ADMIN — NITROFURANTOIN MONOHYDRATE/MACROCRYSTALLINE 100 MG: 25; 75 CAPSULE ORAL at 21:07

## 2021-07-16 RX ADMIN — LEVOTHYROXINE SODIUM 100 MCG: 0.1 TABLET ORAL at 06:27

## 2021-07-16 RX ADMIN — Medication 3 MG: at 21:07

## 2021-07-16 RX ADMIN — ISOSORBIDE MONONITRATE 30 MG: 30 TABLET ORAL at 09:54

## 2021-07-16 RX ADMIN — DOCUSATE SODIUM 100 MG: 100 CAPSULE ORAL at 21:08

## 2021-07-16 RX ADMIN — HYDRALAZINE HYDROCHLORIDE 25 MG: 25 TABLET ORAL at 15:07

## 2021-07-16 RX ADMIN — ATORVASTATIN CALCIUM 10 MG: 10 TABLET, FILM COATED ORAL at 21:08

## 2021-07-16 ASSESSMENT — PAIN SCALES - GENERAL
PAINLEVEL_OUTOF10: 0
PAINLEVEL_OUTOF10: 8
PAINLEVEL_OUTOF10: 0

## 2021-07-16 ASSESSMENT — PAIN - FUNCTIONAL ASSESSMENT: PAIN_FUNCTIONAL_ASSESSMENT: ACTIVITIES ARE NOT PREVENTED

## 2021-07-16 NOTE — PROGRESS NOTES
Toilet Transfer: Contact Guard Assistance. BALANCE:  Sitting Balance:  Supervision. Standing Balance: Contact Guard Assistance. BED MOBILITY:  Not Tested    TRANSFERS:  Sit to Stand:  Contact Guard Assistance. Stand to Sit: Contact Guard Assistance. FUNCTIONAL MOBILITY:  Assistive Device: 4 Wheeled Walker  Assist Level:  Contact Guard Assistance. Distance: To and from bathroom  vcs for breaks      ADDITIONAL ACTIVITIES:  Patient completed BUE strengthening exercises with skilled education on HEP: completed x12 reps x1 set with a min resistance band in all joints and all planes in order to improve UE strength and activity tolerance required for BADL routine and toilet / shower transfers. Patient tolerated well, requiring min rest breaks. Patient also required min cues for technique. Patient completed IADL homemaking task this date of putting laundry away - task was graded to challenge  Safety and balance with 4ww. Patient was able to retrieve all items from dresser with CGA and min VCs for safety during the retrieval and transportation of items. Patient required CGA throughout task with a standing endurance of 10mins. Patient required min VCs for safety. ASSESSMENT:     Activity Tolerance:  Patient tolerance of  treatment: fair. Discharge Recommendations: Patient would benefit from continued therapy after discharge, Continue to assess pending progress, Home with Home health OT   Equipment Recommendations: Other: 4 wheeled walker  Plan: Times per week: 5x/wk for 90 min & 1x/wk for 30 min  Current Treatment Recommendations: Strengthening, Balance Training, Neuromuscular Re-education, Functional Mobility Training, Endurance Training, Home Management Training, Patient/Caregiver Education & Training, Self-Care / ADL, Safety Education & Training  Plan Comment: Pt demonstrates decline from baseline, requiring increased assistance due to decreased endurance and vision.  Pt will benefit from skilled OT services to improve functional mobility and self care ADl routine. Patient Education  Patient Education: IADL's    Goals  Short term goals  Time Frame for Short term goals: 2 weeks  Short term goal 1: Pt will complete visual scanning to L side with min cues during ADLs to decrease fall risk during ADLs. Short term goal 2: Pt will complete LB ADL with set up and adaptive techs to increase indep with self care. Short term goal 3: Pt will complete dynamic standing task with B hand release and SBA x 4 min to increase balance required for ADL completion. Short term goal 4: Pt will complete functional mobility to/from BR with AD and SBA to increase indep with self care. Long term goals  Time Frame for Long term goals : 4 weeks  Long term goal 1: Pt will complete light ADL MOD I to improve indep in preparing a cup of coffee. Long term goal 2: Pt will complete ADL routine including shower transfer with S and adaptive techs to increase indep with self care. Long term goal 3: Pt will complete dynamic standing task with B hand release and S x 10 min to increase balance required for ADL completion. Long term goal 4: Pt will complete functional mobility to/from BR with AD and MOD I to increase indep with self care. Long term goal 5: Pt will complete toileting routine including transfer with S to increase indep. Following session, patient left in safe position with all fall risk precautions in place.

## 2021-07-16 NOTE — PROGRESS NOTES
[facility]  INPATIENT PHYSICAL THERAPY  DAILY NOTE  [address] [facility] - [id number]    Time In: 1340  Time Out: 0945  Timed Code Treatment Minutes: 79 Minutes  Minutes: 70          Date: 2021  Patient Name: Acosta Lai,  Gender:  female        MRN: [id number]  : 1933  (80 y.o.)  Referral Date : 21  Referring Practitioner: Princess El MD  Diagnosis: Breast Cancer Metastisized to brain, right  Additional Pertinent Hx: Per H&P:Latanya Rod is a 80 y.o. female admitted to [facility] on 2021. 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 ([address] Carotid stenosis, Colostomy in place [facility]), Degenerative arthritis of cervical spine, GERD (gastroesophageal reflux disease), Hypercholesteremia, Hypoestrogenism, Hypothyroidism, Lumbago, Lumbosacral spinal stenosis, MDRO (multiple drug resistant organisms) resistance, Personal history of cardiac dysrhythmia, Sigmoid diverticulosis, Spondylolisthesis of lumbosacral region, Steroid-induced hyperglycemia, Subclavian artery stenosis ([address] Superior and Inferior Pubic ramus fracture, right, closed, initial encounter ([address] SVT (supraventricular tachycardia) ([address] Temporal arteritis ([address] Vertebral artery occlusion, and Vitamin D deficiency. Patient presented to [facility] ER after suffering a fall at home. Patient was attempting to ambulate when her legs gave out and she hit her nightstand. No LOC. She was too weak to get up. [facility] arrived and found her on the floor and called EMS. Patient underwent CT head and found to have right side brain mass as well as small subarachnoid hemorrhage. She is s/p debulking of the brain mass per Dr. León Wolfe 2021; Biopsy was c/w metastatic disease from her breast cancer.      Prior Level of Function:  Lives With: Alone  Type of Home: House  Home Layout: One level  Home Access: Level entry  [address]: 4 wheeled walker, [employer], [address], [address] [facility]   Bathroom Shower/Tub: Tub/Shower unit, Shower chair with back  Bathroom Toilet: Standard  Bathroom Equipment: Shower chair, 3-in-1 commode    Receives Help From: Personal care attendant (Monday-Friday 2 hours)  ADL Assistance: Needs assistance  Homemaking Assistance: Needs assistance  Homemaking Responsibilities: Yes  Ambulation Assistance: Independent  Transfer Assistance: Independent  Active : No  Additional Comments: 2 hours/day DIV[place] has an aide that assists with ADLs/IADLs. nieces live nearby and able to help at times. Restrictions/Precautions:  Restrictions/Precautions: General Precautions, Fall Risk  Position Activity Restriction  Other position/activity restrictions: L neglect, R artificial eye     SUBJECTIVE: Pt. Seated in BS chair upon arrival and initially declining therapy stating that she would like to speak with her Oncologist and her PCP. NP arrived shortly after and spoke with pt. Pt. Then agreeable to participate with therapy. Pt. Pleasant throughout session. Pt. Begins to demonstrate increased confusion as session progresses requiring increased assistance for path finding and maneuverability. Pt. Requests to use restroom at end of session. PAIN: None indicated    Vitals: Vitals not assessed per clinical judgement, see nursing flowsheet    OBJECTIVE:  Bed Mobility:  Not Tested    Transfers:  Sit to Stand: Stand By Assistance  Stand to Sit:Stand By Assistance  Cues for hand placement and positioning. Ambulation:  Stand By Assistance, Contact Guard Assistance  Distance: 200' x 2  Surface: Level Tile  Device:4 Wheeled Walker  Gait Deviations: Forward Flexed Posture, Slow Gillian, Decreased Step Length Bilaterally, Decreased Gait Speed, Decreased Heel Strike Bilaterally and R sided neglect. Balance:  Pt.  Completed standing dynamic balance activities with Normal QUIRINO on level tile and Single UE support and No UE support on 4 Wheeled Walker with Stand By Assistance, [address] Activity completed to improve balance, enhance functional mobility, and reduce risk of falls. Pt. Also stood in restroom with intermittent UE support while completing clothing management and hand hygiene. Neuromuscular Education:   Pt. Completed dynamic gait activity maneuvering through obstacles using 4 Wheeled Walker to improve gait mechanics and safety with 4WW for improved functional mobility. Cues required for posture, walker management, and environmental awareness. Pt. Spent ~25 minutes on activity, with rest breaks    Exercise:  None    Functional Outcome Measures: Not completed       ASSESSMENT:  Assessment: Patient progressing toward established goals. Activity Tolerance:  Patient tolerance of  treatment: good. Equipment Recommendations:Equipment Needed: Yes  Other: Pt may need a w/c. Has 4 [employer], lift chair and hospital bed  Discharge Recommendations:  Continue to assess pending progress    Plan: Times per week: 5x/wk 90min, 1x/wk 30min  Times per day: Daily  Current Treatment Recommendations: Strengthening, Transfer Training, Endurance Training, Neuromuscular Re-education, Patient/Caregiver Education & Training, Balance Training, Gait Training, Wheelchair Mobility Training, Home Exercise Program, Functional Mobility Training, Stair training, Safety Education & Training    Patient Education  Patient Education: Plan of Care, Transfers, Gait, Health Promotion and Wellness Education, Home Safety Education    Goals:  Patient goals : go home  Short term goals  Time Frame for Short term goals: 1 week  Short term goal 1: Patient will complete supine < > sit with modified independence to transfer in/out of bed safely. GOAL MET  Short term goal 2: Patient will complete sit < > stand with SBA and verbal cues to less than or equal to 50% of the time to stand to ambulate with decreased difficulty.   Short term

## 2021-07-16 NOTE — PROGRESS NOTES
[facility]  Hematology/ Oncology Progress Note     Pt Name: Samm Lopez  MRN: [id number]  [id number]  YOB: 1933  Admit Date: 7/8/2021  2:41 PM  Date of evaluation: 7/16/2021  Primary Care Physician: Harshad Cueva MD   [id number]       SUBJECTIVE: Doing well. On PT/OT. OBJECTIVE    Physical  VITALS:  BP (!) 182/61   Pulse 63   Temp 98.2 °F (36.8 °C) (Oral)   Resp 20   Ht 5' (1.524 m)   Wt 130 lb 3.2 oz (59.1 kg)   LMP  (LMP Unknown)   SpO2 95%   BMI 25.43 kg/m²   CONSTITUTIONAL:  awake  EYES:  ENT:  normocepalic, without obvious abnormality. Surgical scar llos good. NECK:  supple, symmetrical, trachea midline  HEMATOLOGIC/LYMPHATICS:  no cervical lymphadenopathy and no supraclavicular lymphadenopathy  LUNGS:  No increased work of breathing, good air exchange, clear to auscultation bilaterally, no crackles or wheezing  CARDIOVASCULAR:  normal apical pulses  ABDOMEN:  Soft Non tender. NEUROLOGIC:  Alert  oriented X 3 No focal deficits.   Data  Labs:  General Labs:    CBC:   Lab Results   Component Value Date    WBC 9.4 07/16/2021    RBC 3.71 07/16/2021    RBC 4.23 04/08/2019    HGB 11.2 07/16/2021    HCT 36.5 07/16/2021    MCV 98.4 07/16/2021    MCH 30.2 07/16/2021    MCHC 30.7 07/16/2021    RDW 17.0 04/08/2019     07/16/2021    MPV 11.9 07/16/2021     CBC with Differential:    Lab Results   Component Value Date    WBC 9.4 07/16/2021    RBC 3.71 07/16/2021    RBC 4.23 04/08/2019    HGB 11.2 07/16/2021    HCT 36.5 07/16/2021     07/16/2021    MCV 98.4 07/16/2021    MCH 30.2 07/16/2021    MCHC 30.7 07/16/2021    RDW 17.0 04/08/2019    NRBC 0 07/03/2021    SEGSPCT 90.9 07/03/2021    LYMPHOPCT 21.3 04/08/2019    MONOPCT 3.8 07/03/2021    MONOPCT 9.2 07/23/2018    EOSPCT 0.9 04/08/2019    BASOPCT 0.1 04/08/2019    MONOSABS 0.6 07/03/2021    LYMPHSABS 0.7 07/03/2021    EOSABS 0.0 07/03/2021    BASOSABS 0.0 07/03/2021     Current Medications  Current Facility-Administered Medications: muscle rub cream, , Topical, PRN  dexamethasone (DECADRON) tablet 2 mg, 2 mg, Oral, BID  nitrofurantoin (macrocrystal-monohydrate) (MACROBID) capsule 100 mg, 100 mg, Oral, 2 times per day  melatonin tablet 3 mg, 3 mg, Oral, Nightly PRN  acetaminophen (TYLENOL) tablet 650 mg, 650 mg, Oral, Q4H PRN  magnesium hydroxide (MILK OF MAGNESIA) 400 MG/5ML suspension 30 mL, 30 mL, Oral, Daily PRN  ondansetron (ZOFRAN-ODT) disintegrating tablet 4 mg, 4 mg, Oral, Q8H PRN **OR** [DISCONTINUED] ondansetron (ZOFRAN) injection 4 mg, 4 mg, Intravenous, Q6H PRN  fleet rectal enema 1 enema, 1 enema, Rectal, Daily PRN  lidocaine 4 % external patch 2 patch, 2 patch, Topical, Daily  polyethylene glycol (GLYCOLAX) packet 17 g, 17 g, Oral, Daily  ALPRAZolam (XANAX) tablet 0.25 mg, 0.25 mg, Oral, TID PRN  atorvastatin (LIPITOR) tablet 10 mg, 10 mg, Oral, Nightly  dextrose 5 % solution, 100 mL/hr, Intravenous, PRN  dextrose 50 % IV solution, 12.5 g, Intravenous, PRN  docusate sodium (COLACE) capsule 100 mg, 100 mg, Oral, BID  glucagon (rDNA) injection 1 mg, 1 mg, Intramuscular, PRN  glucose (GLUTOSE) 40 % oral gel 15 g, 15 g, Oral, PRN  hydrALAZINE (APRESOLINE) tablet 25 mg, 25 mg, Oral, 3 times per day  insulin lispro (HUMALOG) injection vial 0-6 Units, 0-6 Units, Subcutaneous, TID WC  insulin lispro (HUMALOG) injection vial 0-3 Units, 0-3 Units, Subcutaneous, Nightly  isosorbide mononitrate (IMDUR) extended release tablet 30 mg, 30 mg, Oral, Daily  levETIRAcetam (KEPPRA) tablet 500 mg, 500 mg, Oral, BID  levothyroxine (SYNTHROID) tablet 100 mcg, 100 mcg, Oral, Daily  pantoprazole (PROTONIX) tablet 40 mg, 40 mg, Oral, QAM AC  sertraline (ZOLOFT) tablet 50 mg, 50 mg, Oral, BID    ASSESSMENT AND PLAN  Patient Active Problem List:     GERD (gastroesophageal reflux disease)     Temporal arteritis (HCC)     Hypercholesteremia     Hypothyroidism due to acquired atrophy of thyroid     Steroid-induced hyperglycemia     Blind right eye     Current chronic use of systemic steroids     Personal history of cardiac dysrhythmia     Coronary artery disease involving native coronary artery of native heart without angina pectoris     Anxiety     H/O: CVA (cerebrovascular accident)     Lumbosacral spinal stenosis     Vitamin D deficiency     Hx of breast cancer with liver mets     Breast cancer metastasized to liver [facility])     Fall     Traumatic ecchymosis of forehead     Brain mass     Subarachnoid hemorrhage following injury, no loss of consciousness (HCC)     Platelet inhibition due to Plavix     Fall at home, initial encounter     Brain edema [facility])     Intraparenchymal hemorrhage of brain ([facility] [place] [address]     Closed head injury     Breast cancer metastasized to brain, right [facility])     Status post craniotomy     Impaired functional mobility, balance, gait, and endurance     Acute cystitis without hematuria    A 1 Breast Cancer Diagnosed in 2016 with Liver mets. With Complete Remission therapy 2 years       Ago.     2 Brain met from breast primary ER/CO negative. Her 2 positive S/P resection      3 Thrombocytopenia.      4 Negative CT Chest, Abdomen and Pelvis for metastatic Disease. Plan[de-identified]   Continue Decadron to 2 mg  BID.              Continue PT/OT               Watch Platelet count.              Will give Enhertu therapy once discharged.  It crosses blood brain barrier.                  INÉS Dsouza,CWS  12:15 PM  7/16/2021

## 2021-07-16 NOTE — PROGRESS NOTES
Comprehensive Nutrition Assessment    Type and Reason for Visit:  Reassess (PO Monitor)    Nutrition Recommendations/Plan:   *Recommend a Multivitamin w/minerals daily. *Continue current diet and Ensure Enlive TID. Nutrition Assessment: Pt improving from a nutritional standpoint AEB pt report of good appetite and intake of meals consuming 75% of most meals and consumes one Ensure Enlive daily. Remains at risk for further nutritional compromise r/t increased nutrient needs for wound healing, admit with fall at home, subarachnoid hemorrhage, right brain mass- metastatic from breast cancer and underlying medical condition (HTN, HLD, GERD). Nutrition recommendations/interventions as per above. Malnutrition Assessment:  Malnutrition Status: At risk for malnutrition (Comment)    Context:  Acute Illness     Findings of the 6 clinical characteristics of malnutrition:  Energy Intake:  No significant decrease in energy intake  Weight Loss:  No significant weight loss     Body Fat Loss:  No significant body fat loss     Muscle Mass Loss:  1 - Mild muscle mass loss Temples (temporalis), Clavicles (pectoralis & deltoids)  Fluid Accumulation:  Unable to assess Extremities   Strength:  Not Performed    Estimated Daily Nutrient Needs:  Energy (kcal):  9057-2100 kcal/day (20-25 kcal/kg); Weight Used for Energy Requirements:  Current (61.2 kg)     Protein (g):  63+ g/day (1.4+ g/kg); Weight Used for Protein Requirements:  Ideal (45 kg)         Nutrition Related Findings: pt seen; she reports good appetite and intake of meals over the past week consuming % of most meals and x2 Ensure Enlive daily; pt denies N/V or chewing/swallowing difficulty with food; last BM x1 on x3 on 7/10. Rx includes: Lipitor, Decadron, Colace, Humalog, Glycolax and Zofran PRN.     Wounds:  Surgical Incision (7/2/21: craniotomy and resection of intracranial mass; right face incision, left arm skin tear, left arm wound, right buttocks stage I)       Current Nutrition Therapies:    ADULT DIET; Regular  Adult Oral Nutrition Supplement; Standard High Calorie/High Protein Oral Supplement    Anthropometric Measures:  · Height: 5' (152.4 cm)  · Current Body Weight: 130 lb 3.2 oz (59.1 kg) (7/16; no edema noted)   · Admission Body Weight: 133 lb 11.2 oz (60.6 kg) (7/8; no edema noted)    · Usual Body Weight:  (per EMR: 8/12/20 131# 9oz, 3/9/21 128# 8oz, 6/15/21 137# 4oz)     · Ideal Body Weight: 100 lbs  · BMI: 25.4  · BMI Categories: Overweight (BMI 25.0-29. 9)       Nutrition Diagnosis:   · Increased nutrient needs related to increase demand for energy/nutrients as evidenced by wounds    Nutrition Interventions:   Food and/or Nutrient Delivery:  Continue Current Diet, Continue Oral Nutrition Supplement, Vitamin Supplement  Nutrition Education/Counseling:  Education initiated (encouraged intake at best effort, use of ONS)   Coordination of Nutrition Care:  Continue to monitor while inpatient    Goals:  Patient will consume 75% or more of meals to aid in healing during LOS.        Nutrition Monitoring and Evaluation:   Behavioral-Environmental Outcomes:  None Identified   Food/Nutrient Intake Outcomes:  Food and Nutrient Intake, Supplement Intake, Vitamin/Mineral Intake  Physical Signs/Symptoms Outcomes:  Biochemical Data, GI Status, Weight, Skin, Nutrition Focused Physical Findings, Fluid Status or Edema     Discharge Planning:    Continue current diet, Continue Oral Nutrition Supplement     Electronically signed by Edi Thorpe, MS, RD, LD on 7/16/21 at 11:27 AM EDT    Contact: [phone]

## 2021-07-16 NOTE — PLAN OF CARE
Problem: DISCHARGE BARRIERS  Goal: Patient's continuum of care needs are met  7/16/2021 1558 by MIGEL Booker  Note: SW attempted to contact patient's grandson, Kari Wagoner, in [place] regarding patient's discharge. No answer at [phone], left message requesting returned phone call. SW to follow and maintain involvement in discharge planning.

## 2021-07-16 NOTE — PLAN OF CARE
Problem: DISCHARGE BARRIERS  Goal: Patient's continuum of care needs are met  Note: SW received returned phone call from patient's Christiane angel, regarding patient's progress and discharge planning. Patient's nieceChristiane, indicates that family will not be able to stay with patient upon discharge. Patient's nieceChristiane, indicates that patient does have a grandson, Julio Garrett, that lives in [place] but has come down to assist before. SW contacted patient's grandson, Julio Garrett, to further discuss patient's discharge. No answer at [phone], left message requesting returned phone call. Patient's non-skilled HHA provided through [place] [address] Patient requests for skilled home health care services to be provided through [facility]. SW contacted [facility] to provide update. Information provided to Karina. ALEX discussed patient's progress and anticipated discharge for Wednesday, 07/21/2021. Karina to notify [employer] of discharge date, Wednesday, 07/21/2021, in order to resume non-skilled HHA. Additionally, referral for RN/PT/OT/ST/HHA provided to Karina for skilled services. Agency has access to referral information. ALEX to follow and maintain involvement in discharge planning.

## 2021-07-16 NOTE — PROGRESS NOTES
Resting in bed eyes open, looking out window, respirations easy. Alert, oriented to person, place and time. ROSA 4 mm to 3 mm brisk. Speech clear. Mucous membranes pink, moist. Smile symmetrical. Hair dry, shiny. Lung sounds clear anterior, posterior, laterally. Heart strong, regular. No cough noted. Bowel sounds active in all 4 quadrants. Abdomen soft, flat and non-tender to palpations. States last bowl movement was 6/15/2021. Denies problems with urination. Radial pulses strong, equal. Hand grasp strong bilaterally. Arm drift negative bilaterally. Capillary refill less than 3 seconds. Skin turgor brisk. Pedal pulse strong, equal. Pedal push and pull strong bilaterally. Missael's sign negative bilaterally. No edema noted. Leg lift strong bilaterally. Denies numbness, tingling. Bed in lowest position, wheels locked. Call light, over bed table within reach. Denies further needs.

## 2021-07-16 NOTE — PROGRESS NOTES
[address] [address]  INPATIENT SPEECH THERAPY  [facility]  DAILY NOTE    TIME   SLP Individual Minutes  Time In: 1130  Time Out: 1200  Minutes: 30  Timed Code Treatment Minutes: 30 Minutes        Date: 2021  Patient Name: Sabina Kessler      CSN: [id number]   : 1933  (80 y.o.)  Gender: female   Referring Physician: Adela Kemp MD  Diagnosis: S/p craniotomy   Secondary Diagnosis: Dysphagia, cognitive linguistic deficits  Precautions: Fall risk, aspiration precautions   Current Diet: Regular, thin liquids   Swallowing Strategies: Full Upright Position, Small Bite/Sip and Alternate Solids and Liquids     Date of Last MBS/FEES: Not Applicable    Pain:  No pain reported. Subjective:  Upon arrival to room, patient completing session with physical therapy. Patient's niece is at bedside. Patient alert, cooperative, and pleasant throughout session this date. Short-Term Goals:  SHORT TERM GOAL #1:  Goal 1: Pt will consume a regular diet and thin liquids without overt s/s of aspiration to meet nutritional/hydration measures safely. INTERVENTIONS: Did not address this session         SHORT TERM GOAL #2:  Goal 2: Patient will complete functional immediate/delayed/prospective recall tasks (inclusion of compensatory strategies) with 80% accuracy, min cues to improve retention of pertinent information. INTERVENTIONS:   Orientation  Place -  [address] [address]- independent  Month - independent  [place] - independent   Date - independent   Time - min cues     Immediate/Delayed Recall  ST completed review of STM strategies using the acronym WRAP--Write it down, Repeat it, Associate it, and Pictures it. ST then provided patient with 4 new units of information related to [address] [place] [address] Doris, Speech Therapist, Abiodun Zurita. ST then wrote 4 new units of information in large print on a piece of paper.  Patient completed repetition of list x4 and associated 4 units of information when given min verbal cues    Immediate Recall: 3/4 independently, 1/4 min cues   Delayed Recall (15 minute):  4/4 with utilization of list  *patient unable to independently recall 4 units of information following a shift in attention; however, patient independently recalled that she utilized list, located list, and read list to identify 4 new units of information. SHORT TERM GOAL #3:  Goal 3: Pt will complete problem solving, verbal/visual reasoning, and executive functioning tasks (safety time, basic money/math comprehensive of preset up pill box) with 70% accuracy, mod cues to improve contribution within ADLs/IADLs. INTERVENTIONS:   Call Light  Patient independently identified use of call light if assistance is needed. Patient independently identified 2/2 scenarios to utilize call light. Patient independently then identified situations patient would utilize call light 4/4 trials when given yes/no questions     Reasoning related to safety within immediate environment:   6/6 independently     Safety Awareness - Home  Patient independently answered safety awareness questions related to a fire, a fall, and illness 3/3 trials. *patient demonstrating good safety awareness within basic problem solving, reasoning, and safety tasks this date; however, continued concern for poor carryover of safety awareness within scenarios. *concerned for increased impulsivity d/t setting off chair alarm ~ 4x within a 10 minute time period. *ST discussed safety concerns with niece following session related to impulsivity, poor insight, and visual deficit which may impact safe return home.        Prior Session  Functional problem solving/comprehension of already set up pill box:   Determining appropriate slot for current [place]: indep   Locating ALL tabs for Wednesday: required max assist  Determining next appropriate Wednesday tab according to current TOD: moderate assist   Determining mistakes made on pill box: 1/2 mod cues, 1/2 Total A   *severely poor success given decreased visual acuity, poor utilization of tactile skills/feedback and significantly poor mental flexibility with rigid thinking present     SHORT TERM GOAL #4:  Goal 4: Pt will complete sequencing and thought organization tasks (i.e. divergent naming, multi step commands, complex yes/no questions) with 70% accuracy, mod cues to improve mental flexibility and processing speed. INTERVENTIONS:  Patient independently generated 6 item ingredient list for homemade fried chicken    Patient then sequenced 7 steps to cook fried chicken: 4/7 steps independently, 3/7 steps given min cues        ST generated a 6 item grocery list. Patient was prompted to identify which department each item was located. Patient correctly identified department 5/6 trials independently, 1/6 given mod verbal cues d/t word finding difficulty with \"dairy\". *adequate thought organization   *slow processing speed observed   *increased difficulty possibly s/t visual impairment - poor working memory which impacts accuracy with verbally presented information. ST then prompted patient to chunk items on list that would be in the same department. Patient chunked items together 2/6 independently and 4/6 given mod-max cues. *poor success with task   *decreased comprehension of task - patient listing items not on list that would be found in dairy department, meat department, and produce department. SHORT TERM GOAL #5:  Goal 5: Pt will complete functional attention tasks (i.e. sustained, selective, alternating) with no more than x3-4 errors/redirections within task completion for successful management of ADL's post discharge. INTERVENTIONS:   Pt was prompted to say \"bonifacio\" for red suit and \"halloween\" for black suit.  Pt independently identified \"bonifacio\" or \"halloween\" correctly 49/52 cards, 2/52 self corrected with min cues, and 1/52 given total assist,  and completed task in 2:52 minutes. *good sustained and selective attention noted with task   *slower processing speed       Long-Term Goals:  Timeframe for Long-term Goals: 2 weeks:  Goal 1: Pt will improve cognitive-linguistic skills to a supervision level of assist for safe, functional return to home setting with family support post discharge. Goal 2: Pt will safely consume regular diet+thin liquids demonstrating independent carryover of trained compensatory swallowing strategies and without overt s/s aspiration or pulmonary compromise to assist with nutrition/hydration measures. Comprehension: 3 - Patient understands basic needs 50-74% of the time  Expression: 4 - Expresses basic ideas/needs 75-90%+ of the time  Social Interaction: 4 - Patient appropriate 75-90%+ of the time  Problem Solvin - Patient solves simple/routine tasks 25%-49%  Memory: 3 - Patient remembers 50%-74% of the time      EDUCATION:  Learner: Patient  Education:  Reviewed ST goals and Plan of Care and Reviewed recommendations for follow-up  Evaluation of Education: Demonstrates with assistance, Needs further instruction and Family not present    ASSESSMENT/PLAN:  Activity Tolerance:  Patient tolerance of  treatment: good. Cooperative with ST and POC. Assessment/Plan: Patient progressing toward established goals. Continues to require skilled care of licensed speech pathologist to progress toward achievement of established goals and plan of care.   Plan for Next Session: Attention and thought organization       [facility]) Rosmery Awad M.A., [address]

## 2021-07-16 NOTE — PROGRESS NOTES
Physical Medicine & Rehabilitation   Progress Note    Chief Complaint:  Brain mass. Rehab needs    Subjective:  Angelita Isaac reported concerns with patient wanting to go home and not work with therapy today. Stating she needs to start her treatment soon. Patient seen up in her chair. Therapy in with patient. Discussed with patient that her oncologist, Dr Jo Ann Mcintyre is aware of and agrees to the plan to get patient stronger prior to returning home and starting treatment. Patient aware of what treatment entails and how it tiring it can be. Patient agreeable to continue to work with therapy and discharge planning for next week. patient pleasant with conversation. understanding with plan. Patient denies any other needs or concerns at this time. +BM 7/15/21    Rehabilitation:  PT:   Transfers:  Sit to Stand: [address], with verbal cues  Stand to Sit:Contact [employer], cues for hand placement, with verbal cues  Reminders for hand placement, brakes and walker safety  Ambulation:  Contact Guard Assistance, Minimal Assistance, with cues for safety, with verbal cues , with increased time for completion  Distance: 318wgg4 15ftx1  Surface: Level Tile  Device:4 Wheeled Walker  Gait Deviations: Forward Flexed Posture, Slow Gillian, Decreased Step Length Bilaterally, Decreased Gait Speed, Decreased Heel Strike Bilaterally, Mild Path Deviations, Unsteady Gait, Decreased Terminal Knee Extension and cues for walker proximity and to keep feet within frame  Balance:  Dynamic Standing Balance: Contact Guard Assistance, with cues for safety, with verbal cues   Pt needing cues for safety and walker management when completing functional tasks in bathroom. Pt stood at sink to wash hands and completed dyn sitting tasks. Exercise:  Patient was guided in 1 set(s) 10 reps of exercise to both lower extremities. Ankle pumps, Glut sets, Hip abduction/adduction, Seated marches, Long arc quads and Seated abduction/adduction. Exercises were completed for increased independence with functional mobility. OT:  COGNITION: Decreased Recall, Decreased Insight, Decreased Problem Solving, Decreased Safety Awareness and Difficulty Following Commands  ADL:   Grooming: Stand By Assistance. wash face seated  Bathing: Stand By Assistance. increased time  Upper Extremity Dressing: Stand By Assistance. verbal cues with clothing items retrieval for transportation  Lower Extremity Dressing: Stand By Assistance. doff/colby B socks/pants/brief seated  Toileting: Stand By Assistance. increased time for thoroughness standard toilet seated  Toilet Transfer: Stand By Assistance. standard toilet  Tub Transfer: [address] verbal cues for techique to utilize GB to shower chair; incresaed time. BALANCE:  Sitting Balance:  Supervision. bedside chair  Standing Balance: Stand By Assistance. 4WW, no LOB  TRANSFERS:  Sit to Stand:  Stand By Assistance. bedsdie chair to RW  Stand to Sit: Stand By Assistance. RW to bedside chair  FUNCTIONAL MOBILITY:  Assistive Device: Rolling Walker  Assist Level:  Stand By Assistance. Distance: To and from shower room and ~80 feet into unit during scavenger hunt activity  Steady pace, no LOB   ADDITIONAL ACTIVITIES:  Patient completed BUE strengthening exercises with skilled education on HEP: completed x15 reps x1 set with a minimal resistance band in all joints and all planes in order to improve UE strength and activity tolerance required for BADL routine and toilet / shower transfers. Patient tolerated good, requiring minimal rest breaks. Patient also required minimal verbal/visual cues for technique. Patient completed scanning activity in order to navigate within unit in order to locate elevators requiring min verbal cues initially to recall task direction as well as assist with fully scanning to L side to locate elevators as well as return to patient's room requiring increased time.       ST: INTERVENTIONS:   Orientation  Place -  [address] [address]- independent  Month - independent  [place] - independent   Date - independent   Time - min cues to utilize clock.      Delayed Recall of ST Information from previous session (3 Units):   2/3 given min cues to locate list and min assistance with reading visual cue d/t vision deficit.      INTERVENTIONS:  ST generated a 6 item grocery list. Patient was prompted to identify which department each item was located. Patient correctly identified department 5/6 trials independently, 1/6 given mod verbal cues d/t word finding difficulty with \"dairy\". *adequate thought organization   *slow processing speed observed   *increased difficulty possibly s/t visual impairment - poor working memory which impacts accuracy with verbally presented information.      ST then prompted patient to chunk items on list that would be in the same department. Patient chunked items together 2/6 independently and 4/6 given mod-max cues. *poor success with task   *decreased comprehension of task - patient listing items not on list that would be found in dairy department, meat department, and produce department. INTERVENTIONS:   Pt was prompted to say \"bonifacio\" for red suit and \"halloween\" for black suit. Pt independently identified \"bonifacio\" or \"halloween\" correctly 49/52 cards, 2/52 self corrected with min cues, and 1/52 given total assist,  and completed task in 2:52 minutes.    *good sustained and selective attention noted with task   *slower processing speed           Review of Systems:  CONSTITUTIONAL:  positive for  fatigue  EYES: blind right eye, cataracts left  HEENT:  negative  RESPIRATORY:  negative  CARDIOVASCULAR:  negative  GASTROINTESTINAL:  Negative  GENITOURINARY:  gracia  SKIN:  Bruising, right facial  HEMATOLOGIC/LYMPHATIC:  positive for swelling/edema  MUSCULOSKELETAL:  positive for  muscle weakness  NEUROLOGICAL:  positive for gait problems and weakness  BEHAVIOR/PSYCH:  negative  System review otherwise negative      Objective:  BP (!) 182/61   Pulse 63   Temp 98.2 °F (36.8 °C) (Oral)   Resp 20   Ht 5' (1.524 m)   Wt 130 lb 3.2 oz (59.1 kg)   LMP  (LMP Unknown)   SpO2 95%   BMI 25.43 kg/m²   awake  Orientation:   person, place, time  Mood: within normal limits  Affect: calm  General appearance: Patient is well nourished, well developed, well groomed and in no acute distress     Memory:   normal,   Attention/Concentration: normal  Language:  normal     Cranial Nerves:  cranial nerves II-XII are grossly intact except chronic vision issues with right eye false and left eye cataracts  ROM:  normal  Tone:  normal  Muscle bulk: within normal limits  Sensory:  Sensory intact     Skin: warm and dry, no rash or erythema. Right facial bruising improving.  Left hand bruising from recent blood draw  Peripheral vascular: Pulses: Normal upper and lower extremity pulses; Edema: trace    Diagnostics:   Recent Results (from the past 24 hour(s))   POCT glucose    Collection Time: 07/15/21 12:07 PM   Result Value Ref Range    POC Glucose 76 70 - 108 mg/dl   POCT glucose    Collection Time: 07/15/21  4:59 PM   Result Value Ref Range    POC Glucose 142 (H) 70 - 108 mg/dl   POCT glucose    Collection Time: 07/15/21  8:08 PM   Result Value Ref Range    POC Glucose 136 (H) 70 - 108 mg/dl   CBC    Collection Time: 07/16/21  7:07 AM   Result Value Ref Range    WBC 9.4 4.8 - 10.8 thou/mm3    RBC 3.71 (L) 4.20 - 5.40 mill/mm3    Hemoglobin 11.2 (L) 12.0 - 16.0 gm/dl    Hematocrit 36.5 (L) 37.0 - 47.0 %    MCV 98.4 81.0 - 99.0 fL    MCH 30.2 26.0 - 33.0 pg    MCHC 30.7 (L) 32.2 - 35.5 gm/dl    RDW-CV 14.4 11.5 - 14.5 %    RDW-SD 51.8 (H) 35.0 - 45.0 fL    Platelets 088 (L) 095 - 400 thou/mm3    MPV 11.9 9.4 - 12.4 fL   POCT glucose    Collection Time: 07/16/21  8:07 AM   Result Value Ref Range    POC Glucose 73 70 - 108 mg/dl       Impression:  · Right brain mass - metastatic from breast  · Fall  · CHI with facial bruising  · Subarachnoid and parenchymal hemorrhage  · Traumatic ecchymosis to left lower leg and left forearm, right forehead ecchymosis  · Bilateral lower extremity weakness  · Hypotension  · Anxiety  · GERD  · SVT  · HLD  · Hyperglycemia, steroid induced  · Hx Breast cancer with mets to the liver    Plan:  Continue current therapies  Prophylaxis: DVT - SCDs/BARRY due to brain bleed, GI - Protonix  Pain: tylenol, lidoderm,   Bowels: Colace, fleets, MOM, miralax. +BM 7/15/21  Seizure: keppra  Anxiety: xanax, zoloft  Melatonin at night PRN  Ok for OTC spray for leg pain at night  Oncology decreasing decadron and plans [place] therapy once discharged  Menthol cream to neck PRN  · Conference tuesdays   · Discharge planning for next week.        Missed Therapy Time:  · None    Annamaria Watson, APRN - CNP

## 2021-07-16 NOTE — PLAN OF CARE
Problem: Falls - Risk of:  Goal: Will remain free from falls  Description: Will remain free from falls  Outcome: Ongoing     Problem: Skin Integrity:  Goal: Will show no infection signs and symptoms  Description: Will show no infection signs and symptoms  Outcome: Ongoing     Problem: Pain:  Goal: Pain level will decrease  Description: Pain level will decrease  Outcome: Ongoing     Problem: Mobility - Impaired:  Goal: Mobility will improve  Description: Mobility will improve  Outcome: Ongoing     Problem: Infection - Surgical Site:  Goal: Will show no infection signs and symptoms  Description: Will show no infection signs and symptoms  Outcome: Ongoing

## 2021-07-16 NOTE — PROGRESS NOTES
Family Medicine Progress Notes/Coverage    Today's Date: 7/16/21  7E-64/064-A  Medical Record # [id number]  CSN/Account # [de-identified]      Ms. Rod admitted on 7/8/2021        Subjective / Interval History :     doing well, No SOB, No chest pain , No fatigue. No nausea nor abdominal pain  \"when am I going home? \"  Reviewed notes, consults, laboratory and radiology results,    Objective:       Physical Exam:  Patient Vitals for the past 24 hrs:   BP Temp Temp src Pulse Resp SpO2 Weight   07/16/21 0735 (!) 182/61 98.2 °F (36.8 °C) Oral 63 20 95 % --   07/16/21 0715 (!) 140/60 98.3 °F (36.8 °C) -- 61 16 97 % --   07/16/21 0646 -- -- -- -- -- -- 130 lb 3.2 oz (59.1 kg)   07/16/21 0629 (!) 147/57 -- -- 58 -- -- --   07/16/21 0628 (!) 147/57 -- -- -- -- -- --   07/15/21 2102 (!) 117/55 98.2 °F (36.8 °C) Oral 67 18 92 % --   07/15/21 1439 (!) 141/70 98 °F (36.7 °C) Oral 66 18 95 % --       General Appearance:  Alert, cooperative, no distress, appears stated age   HEENT:  Neck: Head scalp wound healed well no infection     Chest/Lungs:  Heart: CTA  RRR   Abdomen:  Back: soft   Extremities:  Neurological Exam: No edema  WNL       Assessment:     Admitting Diagnosis:    Breast cancer metastasized to brain, right ([address] [C50.911, C79.31]    Active Hospital Problems    Diagnosis Date Noted    Impaired functional mobility, balance, gait, and endurance [Z74.09] 07/08/2021     Priority: High    Breast cancer metastasized to brain, right ([address] [C50.911, C79.31] 07/06/2021     Priority: High    Status post craniotomy [Z98.890] 07/02/2021     Priority: High    Subarachnoid hemorrhage following injury, no loss of consciousness ([address] [S06.6X0A] 06/28/2021     Priority: High    Fall [W19. XXXA] 06/28/2021     Priority: High    Acute cystitis without hematuria [N30.00] 07/15/2021     Priority: Medium    Fall at home, initial encounter [G45. Rg Fry, M71.527] 06/28/2021     Priority: Medium    Current chronic use of systemic steroids [Z79.52] 09/04/2018     Priority: Medium    Coronary artery disease involving native coronary artery of native heart without angina pectoris [I25.10] 11/06/2017     Priority: Medium    Anxiety [F41.9] 06/29/2016     Priority: Medium    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]      Priority: Medium    Hypothyroidism due to acquired atrophy of thyroid [E03.4] 03/03/2016     Priority: Medium    Temporal arteritis (HCC) [M31.6]      Priority: Medium    GERD (gastroesophageal reflux disease) [K21.9]      Priority: Medium       Plan:     Discussed plans with the nursing staff  I'll be out of town 7/17/21 to 7/25/21 Dr Sukumar Morrissey or Dr Ana Mendez will be covering      Medications, Laboratories and Imaging results:    Scheduled Meds:   dexamethasone  2 mg Oral BID    nitrofurantoin (macrocrystal-monohydrate)  100 mg Oral 2 times per day    lidocaine  2 patch Topical Daily    polyethylene glycol  17 g Oral Daily    atorvastatin  10 mg Oral Nightly    docusate sodium  100 mg Oral BID    hydrALAZINE  25 mg Oral 3 times per day    insulin lispro  0-6 Units Subcutaneous TID WC    insulin lispro  0-3 Units Subcutaneous Nightly    isosorbide mononitrate  30 mg Oral Daily    levETIRAcetam  500 mg Oral BID    levothyroxine  100 mcg Oral Daily    pantoprazole  40 mg Oral QAM AC    sertraline  50 mg Oral BID     Continuous Infusions:   dextrose       PRN Meds:muscle rub, melatonin, acetaminophen, magnesium hydroxide, ondansetron **OR** [DISCONTINUED] ondansetron, fleet, ALPRAZolam, dextrose, dextrose, glucagon (rDNA), glucose    Imaging:    Lab Review :    Lab Results   Component Value Date    WBC 9.4 07/16/2021    HGB 11.2 (L) 07/16/2021    HCT 36.5 (L) 07/16/2021    MCV 98.4 07/16/2021     (L) 07/16/2021     Lab Results   Component Value Date    CREATININE 0.5 07/09/2021    BUN 28 (H) 07/09/2021     07/09/2021    K 4.8 07/09/2021     07/09/2021    CO2 30 07/09/2021     Lab Results   Component Value Date    CKTOTAL 70 06/28/2021     Lab Results   Component Value Date    ALT 81 (H) 07/06/2021    AST 78 (H) 07/06/2021    ALKPHOS 53 07/06/2021    BILITOT 0.5 07/06/2021     Lab Results   Component Value Date    LIPASE 12.6 08/12/2020         Electronically signed by Rj Arauz MD on 7/16/21 at 10:12 AM AMELIA Chapa M.D.

## 2021-07-16 NOTE — PROGRESS NOTES
[place]      Date:  7/16/2021            Patient Name: Mikhail Hillman           MRN: [id number]  Acct: [de-identified]          YOB: 1933 (80 y.o.)       Gender: female   Diagnosis: breast cancer metastasized to the brain, right  Physician: Referring Practitioner:  Terri Sharma MD    REASON FOR MISSED TREATMENT:  pt declined playing [employer] with peers this am stating she is waiting for her niece to arrive with more clothes for her to wear while here-pleasant-affect yaw [place], CTRS    7/16/2021

## 2021-07-16 NOTE — DISCHARGE INSTR - COC
Continuity of Care Form    Patient Name: Angel Luis Ortiz   :  1933  MRN:  [id number]    Admit date:  2021  Discharge date:  ***    Code Status Order: DNR-CCA   Advance Directives:   [facility] Documentation     Date/Time Healthcare Directive Type of Healthcare Directive Copy in [address] [facility] [address] Agent's Name Healthcare Agent's Phone Number    [phone]  Yes, patient has an advance directive for healthcare treatment  Durable power of  for health care  --  --  Rosio Aguirre  --          Admitting Physician: Eloisa Herzog MD  PCP: Bridget Hester MD    Discharging Nurse: [facility] Unit/Room#: 7E-64/064-A  Discharging Unit Phone Number: ***    Emergency Contact:   Extended Emergency Contact Information  Primary Emergency Contact: Daniela [address] [place] [address] Phone: [phone]  Mobile Phone: [phone]  Relation: Niece/Nephew  Preferred language: [address] [place] [address] Road needed? No  Secondary Emergency Contact: Garcia Galindo  [place] Phone: [phone]  Relation: Niece/Nephew  Preferred language: English   needed?  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 performed by Pedro Farrell MD at [address]      ENDOSCOPY, COLON, DIAGNOSTIC      EYE REMOVAL Right 2017    EYE SURGERY      EYE SURGERY Right 2017    Dr Lily Encinas in [address]      partial    OTHER SURGICAL HISTORY  2016    robotic assisted laparoscopic anterior colon resection and end colostomy    TX OFFICE/OUTPT VISIT,PROCEDURE ONLY N/A 2018    COLOSTOMY REVERSAL AND PARASTOMAL HERNIA REPAIR performed by Suri Beckham MD at [address] PULMONARY [facility]  2002    UPPER GASTROINTESTINAL ENDOSCOPY  11/06       Immunization History:   Immunization History   Administered Date(s) Administered    Influenza 09/25/2012    Influenza Virus Vaccine 10/19/2011, 10/28/2014, 12/01/2015    Influenza, Kyle Martinez, IM, (6 mo and older Fluzone, Flulaval, Fluarix and 3 yrs and older Afluria) 10/31/2017, 12/11/2018, 09/17/2019, 12/08/2020    PPD Test 06/01/2016, 06/08/2016, 09/11/2018, 05/25/2019, 06/02/2019    Pneumococcal Conjugate 13-valent (Kxtxsuk22) 12/01/2015    Pneumococcal Polysaccharide (Kzaripnaj08) 11/19/2007    Td (Adult), 5 Lf Tetanus Toxoid, Pf (Tenivac, Decavac) 10/30/2019       Active Problems:  Patient Active Problem List   Diagnosis Code    GERD (gastroesophageal reflux disease) K21.9    Temporal arteritis ([facility] M31.6    Hypercholesteremia E78.00    Hypothyroidism due to acquired atrophy of thyroid E03.4    Steroid-induced hyperglycemia R73.9, T38.0X5A    Blind right eye H54.40    Current chronic use of systemic steroids Z79.52    Personal history of cardiac dysrhythmia Z86.79    Coronary artery disease involving native coronary artery of native heart without angina pectoris I25.10    Anxiety F41.9    H/O: CVA (cerebrovascular accident) Z80.78    Lumbosacral spinal stenosis M48.07    Vitamin D deficiency E55.9    Hx of breast cancer with liver mets Z85.3    Breast cancer metastasized to liver ([facility] C50.919, C78.7    Fall W19. Jay Guallpa    Traumatic ecchymosis of forehead S00.83XA    Brain mass G93.89    Subarachnoid hemorrhage following injury, no loss of consciousness (HCC) S06.6X0A    Platelet inhibition due to Plavix Z79.02    Fall at home, initial encounter W19. Jay Guallpa, Y92.009    Brain edema (HCC) G93.6    Intraparenchymal hemorrhage of brain (HCC) I61.9    Closed head injury S09.90XA    Breast cancer metastasized to brain, right (HCC) C50.911, C79.31    Status post craniotomy Z98.890    Impaired functional mobility, balance, gait, and endurance Z74.09    Acute cystitis without hematuria N30.00       Isolation/Infection:   Isolation          No Isolation        Patient Infection Status     Infection Onset Added Last Indicated Last Indicated By Review Planned Expiration Resolved Resolved By    None active    Resolved    C-diff Rule Out 08/12/20 08/12/20 08/12/20 Gastrointestinal Panel, Molecular (Ordered)   08/13/20 Rule-Out Test Resulted          Nurse Assessment:  Last Vital Signs: /60   Pulse 63   Temp 98.1 °F (36.7 °C) (Oral)   Resp 15   Ht 5' (1.524 m)   Wt 130 lb 3.2 oz (59.1 kg)   LMP  (LMP Unknown)   SpO2 93%   BMI 25.43 kg/m²     Last documented pain score (0-10 scale): Pain Level: 0  Last Weight:   Wt Readings from Last 1 Encounters:   07/16/21 130 lb 3.2 oz (59.1 kg)     Mental Status:  oriented    IV Access:  - None    Nursing Mobility/ADLs:  Walking   Independent  Transfer  Independent  Bathing  Independent  Dressing  Independent  [address]  Med Delivery   whole    Wound Care Documentation and Therapy:  Wound 07/01/21 Arm Left; Lower Skin tear (Active)   Wound Etiology Skin Tear 07/16/21 0933   Dressing Status Clean;Dry; Intact 07/16/21 0933   Dressing/Treatment Open to air 07/16/21 0933   Wound Assessment Dry 07/16/21 0933   Drainage Amount None 07/16/21 0933   Drainage Description Sanguinous 07/06/21 0800   Odor None 07/16/21 0933   Shima-wound Assessment Intact 07/16/21 0933   Number of days: 15       Wound 07/02/21 Buttocks Right [place] like dry red, nonblanching area (Active)   Wound Etiology Pressure Stage  1 07/08/21 0854   Wound Cleansed Soap and water 07/06/21 0800   Dressing/Treatment Open to air 07/16/21 0933   Wound Assessment Dry 07/16/21 0933   Drainage Amount None 07/16/21 0933   Odor None 07/16/21 0933   Shima-wound Assessment Blanchable erythema 07/16/21 0933   Number of days: 13 Wound 21 Arm Left;Medial (Active)   Wound Etiology Other 21   Dressing Status Clean;Dry; Intact 21 0933   Wound Assessment Purple/maroon 21   Drainage Amount None 21   Shima-wound Assessment Blanchable erythema 21   Number of days: 12        Elimination:  Continence:   · Bowel: Yes  · Bladder: Yes  Urinary Catheter: None   Colostomy/Ileostomy/Ileal Conduit: No       Date of Last BM: ***    Intake/Output Summary (Last 24 hours) at 2021 1303  Last data filed at 2021 0927  Gross per 24 hour   Intake 480 ml   Output --   Net 480 ml     I/O last 3 completed shifts:   In: 5 [P.O.:840]  Out: -     Safety Concerns:     [address] [employer] Safety Concerns:[id number]}    Impairments/Disabilities:      [address] [employer] Impairments/Disabilities:639412455}    Nutrition Therapy:  Current Nutrition Therapy:   [address] [employer] Diet List:668164075}    Routes of Feeding: {[facility] DME Other Feedings:873560430}  Liquids: {Slp liquid thickness:38253}  Daily Fluid Restriction: {[facility] DME Yes amt example:906975624}  Last Modified Barium Swallow with Video (Video Swallowing Test): {Done Not Done MJVS:324609378}    Treatments at the Time of Hospital Discharge:   Respiratory Treatments: ***  Oxygen Therapy:  {Therapy; copd oxygen:07219}  Ventilator:    {[facility] Vent VDII:728158541}    Rehab Therapies: {THERAPEUTIC INTERVENTION:9937415748}  Weight Bearing Status/Restrictions: [facility]  Weight Bearin}  Other Medical Equipment (for information only, NOT a DME order):  {EQUIPMENT:786212579}  Other Treatments: ***    Patient's personal belongings (please select all that are sent with patient):  {[facility] DME Belongings:357119076}    RN SIGNATURE:  {Esignature:970549482}

## 2021-07-16 NOTE — PROGRESS NOTES
entry  [address]: 4 wheeled walker, [address], [address], 170 [facility] chair, [address] bed   Bathroom Shower/Tub: Tub/Shower unit, Shower chair with back  Bathroom Toilet: Standard  Bathroom Equipment: Shower chair, 3-in-1 commode    Receives Help From: Personal care attendant (Monday-Friday 2 hours)  ADL Assistance: Needs assistance  Homemaking Assistance: Needs assistance  Homemaking Responsibilities: Yes  Ambulation Assistance: Independent  Transfer Assistance: Independent  Active : No  Additional Comments: 2 hours/day M-F has an aide that assists with ADLs/IADLs. nieces live nearby and able to help at times. Restrictions/Precautions:  Restrictions/Precautions: General Precautions, Fall Risk  Position Activity Restriction  Other position/activity restrictions: L neglect, R artificial eye     SUBJECTIVE: pt in recliner upon arrival just having finished getting washed up with student LPN. pts niece present for session. PAIN: denies 0/10    Vitals: Vitals not assessed per clinical judgement, see nursing flowsheet    OBJECTIVE:  Bed Mobility:  Not Tested    Transfers:  Sit to Stand: [employer], cues for hand placement, with verbal cues, impulsive at times  Stand to [place] [id number], cues for hand placement, with verbal cues, decreased safety awarneess noted- cues for use of brakes and alignment with AD/surface    Ambulation:  Stand By Assistance, [employer], with verbal cues , with increased time for completion  Distance: [id number] and mult shorter distances around easy st/apartment  Surface: Level Tile  Device:4 Wheeled Walker  Gait Deviations:   Forward Flexed Posture, Slow Gillian, Decreased Step Length Bilaterally, Decreased Gait Speed, Decreased Heel Strike Bilaterally, Mild Path Deviations, Unsteady Gait and Decreased Terminal Knee Extension    Balance:  Dynamic Standing Balance: Stand By Assistance, [employer], with cues for safety, with verbal cues , towards ambulating household distances safely. GOAL MET, SEE LTG  Short term goal 4: Patient will propel wheelchair 100' with SBA to increase independence and mobility. Long term goals  Time Frame for Long term goals : 4 weeks  Long term goal 1: Patient will complete sit < > stand and stand pivot transfers with modified independence to transfer surface to surface safely. Long term goal 2: Patient will ambulate 80' with a RW and supervision to navigate home with decreased difficulty. Long term goal 3: Patient will improve tinetti to greater than or equal to 19/28 to reduce her risk for falls. Long term goal 4: Patient will ascend/descend 1 step with a RW and CGA for community entry. Long term goal 5: Patient will propel wheelchair 100' with modified independence to navigate living envionment/community safely    Following session, patient left in safe position with all fall risk precautions in place.

## 2021-07-16 NOTE — PROGRESS NOTES
6051 [facility] 49  [address] [place] [address]  Occupational Therapy  Daily Note  Time:  Time In: 1200  Time Out: 1230  Timed Code Treatment Minutes: 30 Minutes  Minutes: 30          Date: 2021  Patient Name: Meliton Tello,   Gender: female      Room: [facility]/064-A  MRN: [id number]  : 1933  (80 y.o.)  Referring Practitioner: Jean Paul Prater MD  Diagnosis: breast cancer metastasized to the brain, right  Additional Pertinent Hx: Meliton Tello is a 80 y.o. female who presents to the emergency department for evaluation of fall. Patient has had multiple falls over the past couple weeks, refused EMS transfer each time. Today patient had a fall, striking her right temple. Patient is on Plavix. 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 Only pain that she is complaining of is to her right temple. Per MRI: Heterogeneous 5.5 cm nodular peripherally enhancing cystic and solid mass centered at the right occipital/temporal lobe junction with prominent adjacent T2/FLAIR prolongation involving the corpus callosum, temporal, parietal and frontal lobes with prominent mass effect on the right lateral ventricle and 9 mm leftward midline shift. (High grade glioma, glioblastoma multiform). Pt is S/P R craniotomy on 21. Pt admitted to inpatient rehab unit on 2021    Restrictions/Precautions:  Restrictions/Precautions: General Precautions, Fall Risk  Position Activity Restriction  Other position/activity restrictions: L neglect, R artificial eye     SUBJECTIVE: Patient in chair upon arrival neice present     PAIN: no     Vitals: Vitals not assessed per clinical judgement, see nursing flowsheet    COGNITION: Slow Processing, Decreased Recall, Decreased Insight, Decreased Problem Solving and Decreased Safety Awareness    ADL:   Grooming: Stand By Assistance and [address]   standing at sink to wash hands, required verbal cues for safe RW placement m  Toileting: Contact Guard Assistance. Toilet Transfer: Contact Guard Assistance. min impulsive reaching for items vcs for RW . BALANCE:  Sitting Balance:  Supervision. Standing Balance: Contact Guard Assistance. BED MOBILITY:  Not Tested    TRANSFERS:  Sit to Stand:  Contact Guard Assistance. Stand to Sit: Contact Guard Assistance. FUNCTIONAL MOBILITY:  Assistive Device: 4 Wheeled Walker  Assist Level:  Contact Guard Assistance. Distance: To and from bathroom  vcs for breaks        ASSESSMENT:     Activity Tolerance:  Patient tolerance of  treatment: fair. Discharge Recommendations: Patient would benefit from continued therapy after discharge, Continue to assess pending progress, Home with Home health OT   Equipment Recommendations: Other: 4 wheeled walker  Plan: Times per week: 5x/wk for 90 min & 1x/wk for 30 min  Current Treatment Recommendations: Strengthening, Balance Training, Neuromuscular Re-education, Functional Mobility Training, Endurance Training, Home Management Training, Patient/Caregiver Education & Training, Self-Care / ADL, Safety Education & Training  Plan Comment: Pt demonstrates decline from baseline, requiring increased assistance due to decreased endurance and vision. Pt will benefit from skilled OT services to improve functional mobility and self care ADl routine. Patient Education  Patient Education: ADL's    Goals  Short term goals  Time Frame for Short term goals: 2 weeks  Short term goal 1: Pt will complete visual scanning to L side with min cues during ADLs to decrease fall risk during ADLs. Short term goal 2: Pt will complete LB ADL with set up and adaptive techs to increase indep with self care. Short term goal 3: Pt will complete dynamic standing task with B hand release and SBA x 4 min to increase balance required for ADL completion.   Short term goal 4: Pt will complete functional mobility to/from BR with AD and SBA to increase indep with self care. Long term goals  Time Frame for Long term goals : 4 weeks  Long term goal 1: Pt will complete light ADL MOD I to improve indep in preparing a cup of coffee. Long term goal 2: Pt will complete ADL routine including shower transfer with S and adaptive techs to increase indep with self care. Long term goal 3: Pt will complete dynamic standing task with B hand release and S x 10 min to increase balance required for ADL completion. Long term goal 4: Pt will complete functional mobility to/from BR with AD and MOD I to increase indep with self care. Long term goal 5: Pt will complete toileting routine including transfer with S to increase indep. Following session, patient left in safe position with all fall risk precautions in place.

## 2021-07-17 LAB
ERYTHROCYTE [DISTWIDTH] IN BLOOD BY AUTOMATED COUNT: 14.6 % (ref 11.5–14.5)
ERYTHROCYTE [DISTWIDTH] IN BLOOD BY AUTOMATED COUNT: 52.2 FL (ref 35–45)
GLUCOSE BLD-MCNC: 120 MG/DL (ref 70–108)
GLUCOSE BLD-MCNC: 125 MG/DL (ref 70–108)
GLUCOSE BLD-MCNC: 134 MG/DL (ref 70–108)
GLUCOSE BLD-MCNC: 99 MG/DL (ref 70–108)
HCT VFR BLD CALC: 36.9 % (ref 37–47)
HEMOGLOBIN: 11.4 GM/DL (ref 12–16)
MCH RBC QN AUTO: 30.3 PG (ref 26–33)
MCHC RBC AUTO-ENTMCNC: 30.9 GM/DL (ref 32.2–35.5)
MCV RBC AUTO: 98.1 FL (ref 81–99)
PLATELET # BLD: 128 THOU/MM3 (ref 130–400)
PMV BLD AUTO: 12 FL (ref 9.4–12.4)
RBC # BLD: 3.76 MILL/MM3 (ref 4.2–5.4)
WBC # BLD: 9.2 THOU/MM3 (ref 4.8–10.8)

## 2021-07-17 PROCEDURE — 6360000002 HC RX W HCPCS: Performed by: INTERNAL MEDICINE

## 2021-07-17 PROCEDURE — 1180000000 HC REHAB R&B

## 2021-07-17 PROCEDURE — 6370000000 HC RX 637 (ALT 250 FOR IP): Performed by: NURSE PRACTITIONER

## 2021-07-17 PROCEDURE — 97530 THERAPEUTIC ACTIVITIES: CPT

## 2021-07-17 PROCEDURE — 36415 COLL VENOUS BLD VENIPUNCTURE: CPT

## 2021-07-17 PROCEDURE — 6370000000 HC RX 637 (ALT 250 FOR IP): Performed by: EMERGENCY MEDICINE

## 2021-07-17 PROCEDURE — 97116 GAIT TRAINING THERAPY: CPT

## 2021-07-17 PROCEDURE — 82948 REAGENT STRIP/BLOOD GLUCOSE: CPT

## 2021-07-17 PROCEDURE — 85027 COMPLETE CBC AUTOMATED: CPT

## 2021-07-17 RX ADMIN — DEXAMETHASONE 2 MG: 4 TABLET ORAL at 08:24

## 2021-07-17 RX ADMIN — SERTRALINE 50 MG: 50 TABLET, FILM COATED ORAL at 20:38

## 2021-07-17 RX ADMIN — SERTRALINE 50 MG: 50 TABLET, FILM COATED ORAL at 08:24

## 2021-07-17 RX ADMIN — DOCUSATE SODIUM 100 MG: 100 CAPSULE ORAL at 20:39

## 2021-07-17 RX ADMIN — ISOSORBIDE MONONITRATE 30 MG: 30 TABLET ORAL at 08:24

## 2021-07-17 RX ADMIN — LEVOTHYROXINE SODIUM 100 MCG: 0.1 TABLET ORAL at 05:46

## 2021-07-17 RX ADMIN — LEVETIRACETAM 500 MG: 500 TABLET, FILM COATED ORAL at 08:24

## 2021-07-17 RX ADMIN — DOCUSATE SODIUM 100 MG: 100 CAPSULE ORAL at 08:24

## 2021-07-17 RX ADMIN — HYDRALAZINE HYDROCHLORIDE 25 MG: 25 TABLET ORAL at 21:22

## 2021-07-17 RX ADMIN — LEVETIRACETAM 500 MG: 500 TABLET, FILM COATED ORAL at 20:38

## 2021-07-17 RX ADMIN — Medication 3 MG: at 20:38

## 2021-07-17 RX ADMIN — NITROFURANTOIN MONOHYDRATE/MACROCRYSTALLINE 100 MG: 25; 75 CAPSULE ORAL at 20:38

## 2021-07-17 RX ADMIN — ATORVASTATIN CALCIUM 10 MG: 10 TABLET, FILM COATED ORAL at 20:39

## 2021-07-17 RX ADMIN — NITROFURANTOIN MONOHYDRATE/MACROCRYSTALLINE 100 MG: 25; 75 CAPSULE ORAL at 08:24

## 2021-07-17 RX ADMIN — PANTOPRAZOLE SODIUM 40 MG: 40 TABLET, DELAYED RELEASE ORAL at 05:46

## 2021-07-17 RX ADMIN — DEXAMETHASONE 2 MG: 4 TABLET ORAL at 20:38

## 2021-07-17 RX ADMIN — HYDRALAZINE HYDROCHLORIDE 25 MG: 25 TABLET ORAL at 13:14

## 2021-07-17 RX ADMIN — ALPRAZOLAM 0.25 MG: 0.25 TABLET ORAL at 20:38

## 2021-07-17 RX ADMIN — POLYETHYLENE GLYCOL (3350) 17 G: 17 POWDER, FOR SOLUTION ORAL at 08:25

## 2021-07-17 RX ADMIN — HYDRALAZINE HYDROCHLORIDE 25 MG: 25 TABLET ORAL at 05:47

## 2021-07-17 ASSESSMENT — ENCOUNTER SYMPTOMS
DIARRHEA: 0
SHORTNESS OF BREATH: 0
COUGH: 0
APNEA: 0
ABDOMINAL DISTENTION: 0
BACK PAIN: 0
CONSTIPATION: 0

## 2021-07-17 ASSESSMENT — PAIN SCALES - GENERAL: PAINLEVEL_OUTOF10: 0

## 2021-07-17 NOTE — PROGRESS NOTES
[address] [place] [address]  INPATIENT PHYSICAL THERAPY  DAILY NOTE  254 [facility] - 7E-64/064-A    Time In: 0700  Time Out: 0730  Timed Code Treatment Minutes: 30 Minutes  Minutes: 30          Date: 2021  Patient Name: Sherry Miles,  Gender:  female        MRN: [id number]  : 1933  (80 y.o.)  Referral Date : 21  Referring Practitioner: Luz Tran MD  Diagnosis: Breast Cancer Metastisized to brain, right  Additional Pertinent Hx: Per H&P:Latanya Rod is a 80 y.o. female admitted to [address] [place] [address] on 2021. 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 ([address] Carotid stenosis, Colostomy in place [facility]), Degenerative arthritis of cervical spine, GERD (gastroesophageal reflux disease), Hypercholesteremia, Hypoestrogenism, Hypothyroidism, Lumbago, Lumbosacral spinal stenosis, MDRO (multiple drug resistant organisms) resistance, Personal history of cardiac dysrhythmia, Sigmoid diverticulosis, Spondylolisthesis of lumbosacral region, Steroid-induced hyperglycemia, Subclavian artery stenosis ([address] Superior and Inferior Pubic ramus fracture, right, closed, initial encounter ([address] SVT (supraventricular tachycardia) ([address] Temporal arteritis ([address] Vertebral artery occlusion, and Vitamin D deficiency. Patient presented to [facility] ER after suffering a fall at home. Patient was attempting to ambulate when her legs gave out and she hit her nightstand. No LOC. She was too weak to get up. Her [place] aide arrived and found her on the floor and called EMS. Patient underwent CT head and found to have right side brain mass as well as small subarachnoid hemorrhage. She is s/p debulking of the brain mass per Dr. Diann Weber 2021; Biopsy was c/w metastatic disease from her breast cancer.      Prior Level of Function:  Lives With: Alone  Type of Home: House  Home Layout: One level  Home Access: Level entry  [address]: 4 wheeled walker, [address] [facility], Ne, 170 [facility] chair, [address] [facility]   Bathroom Shower/Tub: Tub/Shower unit, Shower chair with back  Bathroom Toilet: Standard  Bathroom Equipment: Shower chair, 3-in-1 commode    Receives Help From: Personal care attendant (Monday-Friday 2 hours)  ADL Assistance: Needs assistance  Homemaking Assistance: Needs assistance  Homemaking Responsibilities: Yes  Ambulation Assistance: Independent  Transfer Assistance: Independent  Active : No  Additional Comments: 2 hours/day MNick[place] has an aide that assists with ADLs/IADLs. nieces live nearby and able to help at times. Restrictions/Precautions:  Restrictions/Precautions: General Precautions, Fall Risk  Position Activity Restriction  Other position/activity restrictions: L neglect, R artificial eye     SUBJECTIVE: pt in bed upon arrival, wakes easily. Pt irritable once awake. Pt states I'm going home to see my little dog and check on my house. Mult times during session pt expresses irritation/complaints about her dog not receiving care and that she can take care of herself and that her family does not want to help her. Pt expressing that she wants to speak with MD about leaving to go home tomorrow to see her dog/house. Reassurance and support attempted however pt remains irritable. Suggested that pt relax, eat breakfast and call her niece/sister after breakfast.    PAIN: no c/o pain    Vitals: Vitals not assessed per clinical judgement, see nursing flowsheet    OBJECTIVE:  Bed Mobility:  Supine to Sit: Stand By Assistance  Scooting: Stand By Assistance, with head of bed raised, with rail    Transfers:  Sit to Stand: Contact Guard Assistance  Stand to [employer] Assistance    Ambulation:  Contact Guard Assistance, with cues for safety, with verbal cues , with increased time for completion  Distance: 200ft  Surface: Level Tile  Device:4 Wheeled Walker  Gait Deviations:   Forward Flexed Posture, Slow Gillian, Decreased Step Length Bilaterally, Decreased Gait Speed, Decreased Heel Strike Bilaterally, Mild Path Deviations and Unsteady Gait    Balance:  Dynamic Sitting Balance: Stand By Assistance  Dynamic Standing Balance: Stand By Assistance, Contact Guard Assistance  Pt completed functional tasks in bathroom with A for stability and task completion. Pt needing cues due to limited vision- pt also appears to be confused at times needing direction (pt looking behind toilet for her dentures, etc.) pt reaching in all directions with isa UEs, min unsteady without LOb. Pt also stood at sink and completed double hand release tasks. Pt needing assistance with task completion. Pt stood ~5mins at sink    Exercise:  Patient was guided in 1 set(s) 10 reps of exercise to both lower extremities. Ankle pumps, Glut sets, Seated marches, Long arc quads and Seated abduction/adduction. Exercises were completed for increased independence with functional mobility. Functional Outcome Measures: Completed       ASSESSMENT:  Assessment: Patient progressing toward established goals. Activity Tolerance:  Patient tolerance of  treatment: good. Pt demos decreased cognition, pt irritable during session, requires hands on assist and cues for direction and task completion. Pt unsteady on feet at times. Will benefit from cont PT at this time     Equipment Recommendations:Equipment Needed: Yes  Other: Pt may need a w/c.   Has [employer], lift chair and hospital bed  Discharge Recommendations:  Continue to assess pending progress recommend 24 hour supervision at d/c    Plan: Times per week: 5x/wk 90min, 1x/wk 30min  Times per day: Daily  Current Treatment Recommendations: Strengthening, Transfer Training, Endurance Training, Neuromuscular Re-education, Patient/Caregiver Education & Training, Balance Training, Gait Training, Wheelchair Mobility Training, Home Exercise Program, Functional Mobility Training, Stair training, Safety Education & Training    Patient Education  Patient Education: Plan of Care, Transfers, Gait, Verbal Exercise Instruction    Goals:  Patient goals : go home  Short term goals  Time Frame for Short term goals: 1 week  Short term goal 1: Patient will complete supine < > sit with modified independence to transfer in/out of bed safely. GOAL MET  Short term goal 2: Patient will complete sit < > stand with SBA and verbal cues to less than or equal to 50% of the time to stand to ambulate with decreased difficulty. Short term goal 3: Patient will ambulate 76' with a RW and CGA to progress towards ambulating household distances safely. GOAL MET, SEE LTG  Short term goal 4: Patient will propel wheelchair 100' with SBA to increase independence and mobility. Long term goals  Time Frame for Long term goals : 4 weeks  Long term goal 1: Patient will complete sit < > stand and stand pivot transfers with modified independence to transfer surface to surface safely. Long term goal 2: Patient will ambulate 80' with a RW and supervision to navigate home with decreased difficulty. Long term goal 3: Patient will improve tinetti to greater than or equal to 19/28 to reduce her risk for falls. Long term goal 4: Patient will ascend/descend 1 step with a RW and CGA for community entry. Long term goal 5: Patient will propel wheelchair 100' with modified independence to navigate living envionment/community safely    Following session, patient left in safe position with all fall risk precautions in place.

## 2021-07-17 NOTE — PLAN OF CARE
Problem: Falls - Risk of:  Goal: Will remain free from falls  Description: Will remain free from falls  7/17/2021 0335 by Wisam Hughes RN  Outcome: Ongoing     Problem: Falls - Risk of:  Goal: Absence of physical injury  Description: Absence of physical injury  Outcome: Ongoing     Problem: Skin Integrity:  Goal: Will show no infection signs and symptoms  Description: Will show no infection signs and symptoms  7/17/2021 0335 by Wisam Hughes RN  Outcome: Ongoing     Problem: Skin Integrity:  Goal: Absence of new skin breakdown  Description: Absence of new skin breakdown  Outcome: Ongoing     Problem: Pain:  Goal: Pain level will decrease  Description: Pain level will decrease  7/17/2021 0335 by Wisam Hughes RN  Outcome: Ongoing     Problem: Pain:  Goal: Control of acute pain  Description: Control of acute pain  Outcome: Ongoing     Problem: Pain:  Goal: Control of chronic pain  Description: Control of chronic pain  Outcome: Ongoing     Problem: Mobility - Impaired:  Goal: Mobility will improve  Description: Mobility will improve  7/17/2021 0335 by Wisam Hughes RN  Outcome: Ongoing     Problem: Infection - Surgical Site:  Goal: Will show no infection signs and symptoms  Description: Will show no infection signs and symptoms  7/17/2021 0335 by Wisam Hughes RN  Outcome: Ongoing     Problem: Discharge Planning:  Goal: Patients continuum of care needs are met  Description: Patients continuum of care needs are met  Outcome: Ongoing     Problem: Serum Glucose Level - Abnormal:  Goal: Ability to maintain appropriate glucose levels has stabilized  Description: Ability to maintain appropriate glucose levels has stabilized  Outcome: Ongoing     Problem: Mood - Altered:  Goal: Mood stable  Description: Mood stable  Outcome: Ongoing     Problem: IP COMMUNICATION/DYSARTHRIA  Goal: LTG - Patient will improve receptive language skills to allow for completion of daily activities  Outcome: Ongoing     Problem: IP SWALLOWING  Goal: LTG - patient will tolerate the least restrictive diet consistency to allow for safe consumption of daily meals  Outcome: Ongoing     Problem: IP DRESSINGS LOWER EXTREMITIES  Goal: LTG - Patient will safely utilize adaptive techniques/equipment to dress lower body  Outcome: Ongoing     Problem: IP BALANCE  Goal: LTG - Patient will maintain balance to allow for safe/functional mobility  Outcome: Ongoing     Problem: IP MOBILITY  Goal: LTG - patient will ambulate household distance  Outcome: Ongoing     Problem: Nutrition  Goal: Optimal nutrition therapy  Outcome: Ongoing     Problem: DISCHARGE BARRIERS  Goal: Patient's continuum of care needs are met  7/17/2021 0335 by Nahun Xavier RN  Outcome: Ongoing   Care plan reviewed with patient. Patient verbalizes understanding of care plan and treatment goals.

## 2021-07-17 NOTE — PROGRESS NOTES
[address] 83  [address] [facility]  Occupational Therapy  Daily Note  Time:   Time In: 1130  Time Out: 1200  Timed Code Treatment Minutes: 30 Minutes  Minutes: 30          Date: 2021  Patient Name: Angel Luis Ortiz,   Gender: female      Room: [facility]/064-A  MRN: [id number]  : 1933  (80 y.o.)  Referring Practitioner: Eloisa Herzog MD  Diagnosis: breast cancer metastasized to the brain, right  Additional Pertinent Hx: Angel Luis Ortiz is a 80 y.o. female who presents to the emergency department for evaluation of fall. Patient has had multiple falls over the past couple weeks, refused EMS transfer each time. Today patient had a fall, striking her right temple. Patient is on Plavix. 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 Only pain that she is complaining of is to her right temple. Per MRI: Heterogeneous 5.5 cm nodular peripherally enhancing cystic and solid mass centered at the right occipital/temporal lobe junction with prominent adjacent T2/FLAIR prolongation involving the corpus callosum, temporal, parietal and frontal lobes with prominent mass effect on the right lateral ventricle and 9 mm leftward midline shift. (High grade glioma, glioblastoma multiform). Pt is S/P R craniotomy on 21. Pt admitted to inpatient rehab unit on 2021    Restrictions/Precautions:  Restrictions/Precautions: General Precautions, Fall Risk  Position Activity Restriction  Other position/activity restrictions: L neglect, R artificial eye     SUBJECTIVE: Pt sitting in chair upon arrival, pt agreeable to OT session    PAIN: 0/10:     Vitals: Blood Pressure: 129/59  Oxygen: 95    COGNITION: Impaired Memory    ADL:   No ADL's completed this session. Derek Flores BALANCE:  Standing Balance: Stand By Assistance. with using RW with pt standing for 17 minutes while completing visual scanning activity standing.  Pt able to correctly identifiy  letters with min prompts for left to right scanning, and for completion of circling letters. BED MOBILITY:  Not Tested    TRANSFERS:  Sit to Stand:  Stand By Assistance. for safety  Stand to Sit: Stand By Assistance. for safety    FUNCTIONAL MOBILITY:  Assistive Device: rollator walker  Assist Level:  Contact Guard Assistance. Distance: To and from therapy apartment  With vc's for walker navigation due to pt veering to the left more     ADDITIONAL ACTIVITIES:  Pt stated she felt dizzy at the end of session this date, with vital signs WNL. RN made aware with checking BS, with pt's sugar at 120. Pt advised to sit and rest until lunch. ASSESSMENT:     Activity Tolerance:  Patient tolerance of  treatment: good. Discharge Recommendations: Patient would benefit from continued therapy after discharge, Continue to assess pending progress, Home with Home health OT   Equipment Recommendations: Other: 4 wheeled walker  Plan: Times per week: 5x/wk for 90 min & 1x/wk for 30 min  Current Treatment Recommendations: Strengthening, Balance Training, Neuromuscular Re-education, Functional Mobility Training, Endurance Training, Home Management Training, Patient/Caregiver Education & Training, Self-Care / ADL, Safety Education & Training  Plan Comment: Pt demonstrates decline from baseline, requiring increased assistance due to decreased endurance and vision. Pt will benefit from skilled OT services to improve functional mobility and self care ADl routine. Patient Education  Patient Education: Assistive Device Safety    Goals  Short term goals  Time Frame for Short term goals: 2 weeks  Short term goal 1: Pt will complete visual scanning to L side with min cues during ADLs to decrease fall risk during ADLs. Short term goal 2: Pt will complete LB ADL with set up and adaptive techs to increase indep with self care.   Short term goal 3: Pt will complete dynamic standing task with B hand release and SBA x 4 min to increase balance required for ADL completion. Short term goal 4: Pt will complete functional mobility to/from BR with AD and SBA to increase indep with self care. Long term goals  Time Frame for Long term goals : 4 weeks  Long term goal 1: Pt will complete light ADL MOD I to improve indep in preparing a cup of coffee. Long term goal 2: Pt will complete ADL routine including shower transfer with S and adaptive techs to increase indep with self care. Long term goal 3: Pt will complete dynamic standing task with B hand release and S x 10 min to increase balance required for ADL completion. Long term goal 4: Pt will complete functional mobility to/from BR with AD and MOD I to increase indep with self care. Long term goal 5: Pt will complete toileting routine including transfer with S to increase indep. Following session, patient left in safe position with all fall risk precautions in place.

## 2021-07-18 LAB
ERYTHROCYTE [DISTWIDTH] IN BLOOD BY AUTOMATED COUNT: 14.4 % (ref 11.5–14.5)
ERYTHROCYTE [DISTWIDTH] IN BLOOD BY AUTOMATED COUNT: 53.1 FL (ref 35–45)
GLUCOSE BLD-MCNC: 100 MG/DL (ref 70–108)
GLUCOSE BLD-MCNC: 118 MG/DL (ref 70–108)
GLUCOSE BLD-MCNC: 118 MG/DL (ref 70–108)
GLUCOSE BLD-MCNC: 136 MG/DL (ref 70–108)
HCT VFR BLD CALC: 40.5 % (ref 37–47)
HEMOGLOBIN: 12 GM/DL (ref 12–16)
MCH RBC QN AUTO: 29.9 PG (ref 26–33)
MCHC RBC AUTO-ENTMCNC: 29.6 GM/DL (ref 32.2–35.5)
MCV RBC AUTO: 100.7 FL (ref 81–99)
PLATELET # BLD: 136 THOU/MM3 (ref 130–400)
PMV BLD AUTO: 11.8 FL (ref 9.4–12.4)
RBC # BLD: 4.02 MILL/MM3 (ref 4.2–5.4)
WBC # BLD: 10.4 THOU/MM3 (ref 4.8–10.8)

## 2021-07-18 PROCEDURE — 1180000000 HC REHAB R&B

## 2021-07-18 PROCEDURE — 82948 REAGENT STRIP/BLOOD GLUCOSE: CPT

## 2021-07-18 PROCEDURE — 6360000002 HC RX W HCPCS: Performed by: INTERNAL MEDICINE

## 2021-07-18 PROCEDURE — 85027 COMPLETE CBC AUTOMATED: CPT

## 2021-07-18 PROCEDURE — 36415 COLL VENOUS BLD VENIPUNCTURE: CPT

## 2021-07-18 PROCEDURE — 6370000000 HC RX 637 (ALT 250 FOR IP): Performed by: EMERGENCY MEDICINE

## 2021-07-18 RX ADMIN — DOCUSATE SODIUM 100 MG: 100 CAPSULE ORAL at 09:20

## 2021-07-18 RX ADMIN — ALPRAZOLAM 0.25 MG: 0.25 TABLET ORAL at 23:08

## 2021-07-18 RX ADMIN — HYDRALAZINE HYDROCHLORIDE 25 MG: 25 TABLET ORAL at 05:01

## 2021-07-18 RX ADMIN — HYDRALAZINE HYDROCHLORIDE 25 MG: 25 TABLET ORAL at 20:28

## 2021-07-18 RX ADMIN — NITROFURANTOIN MONOHYDRATE/MACROCRYSTALLINE 100 MG: 25; 75 CAPSULE ORAL at 20:27

## 2021-07-18 RX ADMIN — SERTRALINE 50 MG: 50 TABLET, FILM COATED ORAL at 20:28

## 2021-07-18 RX ADMIN — LEVETIRACETAM 500 MG: 500 TABLET, FILM COATED ORAL at 20:27

## 2021-07-18 RX ADMIN — HYDRALAZINE HYDROCHLORIDE 25 MG: 25 TABLET ORAL at 12:39

## 2021-07-18 RX ADMIN — SERTRALINE 50 MG: 50 TABLET, FILM COATED ORAL at 09:20

## 2021-07-18 RX ADMIN — ATORVASTATIN CALCIUM 10 MG: 10 TABLET, FILM COATED ORAL at 20:27

## 2021-07-18 RX ADMIN — DEXAMETHASONE 2 MG: 4 TABLET ORAL at 20:27

## 2021-07-18 RX ADMIN — DEXAMETHASONE 2 MG: 4 TABLET ORAL at 09:19

## 2021-07-18 RX ADMIN — LEVETIRACETAM 500 MG: 500 TABLET, FILM COATED ORAL at 09:20

## 2021-07-18 RX ADMIN — NITROFURANTOIN MONOHYDRATE/MACROCRYSTALLINE 100 MG: 25; 75 CAPSULE ORAL at 09:19

## 2021-07-18 RX ADMIN — LEVOTHYROXINE SODIUM 100 MCG: 0.1 TABLET ORAL at 05:00

## 2021-07-18 RX ADMIN — PANTOPRAZOLE SODIUM 40 MG: 40 TABLET, DELAYED RELEASE ORAL at 05:00

## 2021-07-18 RX ADMIN — ISOSORBIDE MONONITRATE 30 MG: 30 TABLET ORAL at 09:19

## 2021-07-18 ASSESSMENT — PAIN SCALES - GENERAL
PAINLEVEL_OUTOF10: 0
PAINLEVEL_OUTOF10: 0

## 2021-07-18 NOTE — PLAN OF CARE
Problem: Falls - Risk of:  Goal: Will remain free from falls  Description: Will remain free from falls  Outcome: Ongoing     Problem: Falls - Risk of:  Goal: Absence of physical injury  Description: Absence of physical injury  Outcome: Ongoing     Problem: Skin Integrity:  Goal: Will show no infection signs and symptoms  Description: Will show no infection signs and symptoms  Outcome: Ongoing     Problem: Skin Integrity:  Goal: Absence of new skin breakdown  Description: Absence of new skin breakdown  Outcome: Ongoing     Problem: Pain:  Goal: Pain level will decrease  Description: Pain level will decrease  Outcome: Ongoing     Problem: Pain:  Goal: Control of acute pain  Description: Control of acute pain  Outcome: Ongoing     Problem: Pain:  Goal: Control of chronic pain  Description: Control of chronic pain  Outcome: Ongoing     Problem: Mobility - Impaired:  Goal: Mobility will improve  Description: Mobility will improve  Outcome: Ongoing     Problem: Infection - Surgical Site:  Goal: Will show no infection signs and symptoms  Description: Will show no infection signs and symptoms  Outcome: Ongoing     Problem: Discharge Planning:  Goal: Patients continuum of care needs are met  Description: Patients continuum of care needs are met  Outcome: Ongoing     Problem: Serum Glucose Level - Abnormal:  Goal: Ability to maintain appropriate glucose levels has stabilized  Description: Ability to maintain appropriate glucose levels has stabilized  Outcome: Ongoing     Problem: Mood - Altered:  Goal: Mood stable  Description: Mood stable  Outcome: Ongoing     Problem: IP COMMUNICATION/DYSARTHRIA  Goal: LTG - Patient will improve receptive language skills to allow for completion of daily activities  Outcome: Ongoing     Problem: IP SWALLOWING  Goal: LTG - patient will tolerate the least restrictive diet consistency to allow for safe consumption of daily meals  Outcome: Ongoing     Problem: IP DRESSINGS LOWER

## 2021-07-18 NOTE — PLAN OF CARE
Problem: Falls - Risk of:  Goal: Will remain free from falls  Description: Will remain free from falls  Outcome: Ongoing  Note: Patient to remain free from falls. Bed and chair alarms in use at all times. Patient ambulates with front wheel walker and gait belt. Patient alert, oriented, and able to use call light appropriately in advance of needs. Problem: Skin Integrity:  Goal: Will show no infection signs and symptoms  Description: Will show no infection signs and symptoms  Outcome: Ongoing  Note: Patient free from signs and symptoms of skin infection. Patient is afebrile. Problem: Pain:  Goal: Pain level will decrease  Description: Pain level will decrease  Outcome: Ongoing  Note: Patient denies pain throughout shift. Problem: Infection - Surgical Site:  Goal: Will show no infection signs and symptoms  Description: Will show no infection signs and symptoms  Outcome: Ongoing  Note: Patient free from signs and symptoms of skin infection. Patient is afebrile. Problem: Serum Glucose Level - Abnormal:  Goal: Ability to maintain appropriate glucose levels has stabilized  Description: Ability to maintain appropriate glucose levels has stabilized  Outcome: Ongoing  Note: Patients glucose levels stable and within defined limits throughout shift. Patient has not received Humalog coverage. Problem: Mood - Altered:  Goal: Mood stable  Description: Mood stable  Outcome: Ongoing  Note: Patient calm and cooperative throughout day. Care plan reviewed with patient. Patient verbalizes understanding of the plan of care and contribute to goal setting.

## 2021-07-18 NOTE — PROGRESS NOTES
Progress Note  Date:7/17/2021       ANTONIO:[id number]  Patient Name:Latanya Rod     YOB: 1933     Age:88 y.o. Subjective    Subjective:  Symptoms:  Stable. She reports weakness (better). No shortness of breath, cough, chest pain or diarrhea. Diet:  Adequate intake. Activity level: Returning to normal.    Pain:  She reports no pain.        Current Facility-Administered Medications   Medication Dose Route Frequency Provider Last Rate Last Admin    muscle rub cream   Topical PRN PAMELA Vazquez CNP        dexamethasone (DECADRON) tablet 2 mg  2 mg Oral BID Kina Matta MD   2 mg at 07/17/21 2038    nitrofurantoin (macrocrystal-monohydrate) (MACROBID) capsule 100 mg  100 mg Oral 2 times per day Modesta An MD   100 mg at 07/17/21 2038    melatonin tablet 3 mg  3 mg Oral Nightly PRN PAMELA Dey CNP   3 mg at 07/17/21 2038    acetaminophen (TYLENOL) tablet 650 mg  650 mg Oral Q4H PRN Saintclair Lank, MD   650 mg at 07/16/21 0954    magnesium hydroxide (MILK OF MAGNESIA) 400 MG/5ML suspension 30 mL  30 mL Oral Daily PRN Saintclair Lank, MD        ondansetron (ZOFRAN-ODT) disintegrating tablet 4 mg  4 mg Oral Q8H PRN Modesta An MD   4 mg at 07/10/21 2138    fleet rectal enema 1 enema  1 enema Rectal Daily PRN Modesta An MD        lidocaine 4 % external patch 2 patch  2 patch Topical Daily Modesta An MD   2 patch at 07/17/21 0824    polyethylene glycol (GLYCOLAX) packet 17 g  17 g Oral Daily Modesta An MD   17 g at 07/17/21 0825    ALPRAZolam Erica Heys) tablet 0.25 mg  0.25 mg Oral TID PRN Modesta An MD   0.25 mg at 07/17/21 2038    atorvastatin (LIPITOR) tablet 10 mg  10 mg Oral Nightly Modesta An MD   10 mg at 07/17/21 2039    dextrose 5 % solution  100 mL/hr Intravenous PRN Modesta An MD        dextrose 50 % IV solution  12.5 g Intravenous PRN Modesta An MD        docusate sodium (Mimi Goods) capsule 100 mg  100 mg Oral BID Leyla Cartwright MD   100 mg at 21    glucagon (rDNA) injection 1 mg  1 mg Intramuscular PRN Leyla Cartwright MD        glucose (GLUTOSE) 40 % oral gel 15 g  15 g Oral PRN Leyla Cartwright MD        hydrALAZINE (APRESOLINE) tablet 25 mg  25 mg Oral 3 times per day Leyla Cartwright MD   25 mg at 21    insulin lispro (HUMALOG) injection vial 0-6 Units  0-6 Units Subcutaneous TID WC Leyla Cartwright MD   1 Units at 07/15/21 1822    insulin lispro (HUMALOG) injection vial 0-3 Units  0-3 Units Subcutaneous Nightly Leyla Cartwright MD   1 Units at 21    isosorbide mononitrate (IMDUR) extended release tablet 30 mg  30 mg Oral Daily Leyla Cartwright MD   30 mg at 21    levETIRAcetam (KEPPRA) tablet 500 mg  500 mg Oral BID Leyla Cartwright MD   500 mg at 21    levothyroxine (SYNTHROID) tablet 100 mcg  100 mcg Oral Daily Leyla Cartwright MD   100 mcg at 21 0546    pantoprazole (PROTONIX) tablet 40 mg  40 mg Oral QAM AC Leyla Cartwright MD   40 mg at 21 0546    sertraline (ZOLOFT) tablet 50 mg  50 mg Oral BID Leyla Cartwright MD   50 mg at 21      Review of Systems   Constitutional: Negative for activity change and appetite change. HENT: Negative for congestion, hearing loss and mouth sores. Respiratory: Negative for apnea, cough and shortness of breath. Cardiovascular: Negative for chest pain and palpitations. Gastrointestinal: Negative for abdominal distention, constipation and diarrhea. Genitourinary: Negative for difficulty urinating, flank pain, frequency and hematuria. Musculoskeletal: Negative for arthralgias and back pain. Neurological: Positive for weakness (better). Negative for dizziness, light-headedness and headaches.      Objective         Vitals Last 24 Hours:  TEMPERATURE:  Temp  Av.9 °F (36.1 °C)  Min: 95.8 °F (35.4 °C)  Max: 97.9 °F (36.6 °C)  RESPIRATIONS RANGE: Resp  Av  Min: 18  Max: 18  PULSE OXIMETRY RANGE: SpO2  Av.7 %  Min: 94 %  Max: 95 %  PULSE RANGE: Pulse  Av.7  Min: 60  Max: 78  BLOOD PRESSURE RANGE: Systolic (69CYA), XBO:277 , Min:120 , RPH:157   ; Diastolic (76LDN), UMY:40, Min:53, Max:63    I/O (24Hr): Intake/Output Summary (Last 24 hours) at 2021 2339  Last data filed at 2021 1912  Gross per 24 hour   Intake 936 ml   Output --   Net 936 ml     Objective:  General Appearance:  Comfortable. Vital signs: (most recent): Blood pressure 135/61, pulse 74, temperature 95.8 °F (35.4 °C), temperature source Oral, resp. rate 18, height 5' (1.524 m), weight 132 lb 11.2 oz (60.2 kg), SpO2 94 %, not currently breastfeeding. Vital signs are normal.    Output: Producing urine and producing stool. HEENT: Normal HEENT exam.    Lungs:  Normal effort and normal respiratory rate. Breath sounds clear to auscultation. Heart: Normal rate. Regular rhythm. S1 normal and S2 normal.  No murmur. Abdomen: Abdomen is soft and non-distended. Bowel sounds are normal.   There is no abdominal tenderness. Extremities: Normal range of motion. Neurological: Patient is alert and oriented to person, place and time. Patient has normal reflexes, normal muscle tone and normal coordination. Pupils:  Pupils are equal, round, and reactive to light. Labs/Imaging/Diagnostics    Labs:  CBC:  Recent Labs     07/15/21  0557 21  0707 21  0658   WBC 10.3 9.4 9.2   RBC 3.69* 3.71* 3.76*   HGB 11.0* 11.2* 11.4*   HCT 36.3* 36.5* 36.9*   MCV 98.4 98.4 98.1    118* 128*     CHEMISTRIES:No results for input(s): NA, K, CL, CO2, BUN, CREATININE, GLUCOSE, PHOS, MG in the last 72 hours. Invalid input(s): CA  PT/INR:No results for input(s): PROTIME, INR in the last 72 hours. APTT:No results for input(s): APTT in the last 72 hours.   LIVER PROFILE:No results for input(s): AST, ALT, BILIDIR, BILITOT, ALKPHOS in the last 72 hours. Imaging Last 24 Hours:  No results found. Assessment//Plan           Hospital Problems         Last Modified POA    Breast cancer metastasized to brain, right ([address] 7/8/2021 Yes    Status post craniotomy 7/9/2021 Yes    Impaired functional mobility, balance, gait, and endurance 7/9/2021 Yes    GERD (gastroesophageal reflux disease) 7/9/2021 Yes    Temporal arteritis ([address] 7/9/2021 Yes    Hypothyroidism due to acquired atrophy of thyroid 7/9/2021 Yes    Blind right eye 7/9/2021 Yes    Current chronic use of systemic steroids 7/9/2021 Yes    Acute cystitis without hematuria 7/15/2021 Yes    Coronary artery disease involving native coronary artery of native heart without angina pectoris 7/9/2021 Yes    Anxiety 7/9/2021 Yes    Fall 7/9/2021 Yes    Subarachnoid hemorrhage following injury, no loss of consciousness ([address] 7/9/2021 Yes    Fall at home, initial encounter 7/9/2021 Yes        Assessment:    Condition: In stable condition. Improving.   (  htn stable      breast  Cancer  With brain mets and more  Stable with gait     ashd stable     thyroids  On meds      gerd  Stable     deconditioning  Noted  And better     steroid induced hyperglycemia). Plan:   Per physical therapy. Consults: physical therapy, occupational therapy and speech therapy. Advance diet as tolerated. Administer medications as ordered. ( Stable and continue with rehab as continues  To improve      sugars sgtable).        Electronically signed by Mari Lopez MD on 7/17/21 at 11:39 PM EDT

## 2021-07-19 LAB
ERYTHROCYTE [DISTWIDTH] IN BLOOD BY AUTOMATED COUNT: 14.3 % (ref 11.5–14.5)
ERYTHROCYTE [DISTWIDTH] IN BLOOD BY AUTOMATED COUNT: 52.5 FL (ref 35–45)
GLUCOSE BLD-MCNC: 108 MG/DL (ref 70–108)
GLUCOSE BLD-MCNC: 83 MG/DL (ref 70–108)
GLUCOSE BLD-MCNC: 97 MG/DL (ref 70–108)
GLUCOSE BLD-MCNC: 98 MG/DL (ref 70–108)
HCT VFR BLD CALC: 37 % (ref 37–47)
HEMOGLOBIN: 11.1 GM/DL (ref 12–16)
MCH RBC QN AUTO: 29.8 PG (ref 26–33)
MCHC RBC AUTO-ENTMCNC: 30 GM/DL (ref 32.2–35.5)
MCV RBC AUTO: 99.2 FL (ref 81–99)
PLATELET # BLD: 120 THOU/MM3 (ref 130–400)
PMV BLD AUTO: 11.6 FL (ref 9.4–12.4)
RBC # BLD: 3.73 MILL/MM3 (ref 4.2–5.4)
WBC # BLD: 8.8 THOU/MM3 (ref 4.8–10.8)

## 2021-07-19 PROCEDURE — 97110 THERAPEUTIC EXERCISES: CPT

## 2021-07-19 PROCEDURE — 6360000002 HC RX W HCPCS: Performed by: INTERNAL MEDICINE

## 2021-07-19 PROCEDURE — 97129 THER IVNTJ 1ST 15 MIN: CPT

## 2021-07-19 PROCEDURE — 97530 THERAPEUTIC ACTIVITIES: CPT

## 2021-07-19 PROCEDURE — 82948 REAGENT STRIP/BLOOD GLUCOSE: CPT

## 2021-07-19 PROCEDURE — 6370000000 HC RX 637 (ALT 250 FOR IP): Performed by: EMERGENCY MEDICINE

## 2021-07-19 PROCEDURE — 97130 THER IVNTJ EA ADDL 15 MIN: CPT

## 2021-07-19 PROCEDURE — 97112 NEUROMUSCULAR REEDUCATION: CPT

## 2021-07-19 PROCEDURE — 36415 COLL VENOUS BLD VENIPUNCTURE: CPT

## 2021-07-19 PROCEDURE — 85027 COMPLETE CBC AUTOMATED: CPT

## 2021-07-19 PROCEDURE — 1180000000 HC REHAB R&B

## 2021-07-19 PROCEDURE — 6370000000 HC RX 637 (ALT 250 FOR IP): Performed by: NURSE PRACTITIONER

## 2021-07-19 PROCEDURE — 97116 GAIT TRAINING THERAPY: CPT

## 2021-07-19 PROCEDURE — 99231 SBSQ HOSP IP/OBS SF/LOW 25: CPT | Performed by: NURSE PRACTITIONER

## 2021-07-19 PROCEDURE — 97535 SELF CARE MNGMENT TRAINING: CPT

## 2021-07-19 RX ORDER — TRAZODONE HYDROCHLORIDE 50 MG/1
TABLET ORAL
Qty: 30 TABLET | Refills: 0 | Status: SHIPPED | OUTPATIENT
Start: 2021-07-19 | End: 2021-08-16

## 2021-07-19 RX ORDER — DEXAMETHASONE 4 MG/1
2 TABLET ORAL DAILY
Status: DISCONTINUED | OUTPATIENT
Start: 2021-07-20 | End: 2021-07-21 | Stop reason: HOSPADM

## 2021-07-19 RX ADMIN — PANTOPRAZOLE SODIUM 40 MG: 40 TABLET, DELAYED RELEASE ORAL at 05:14

## 2021-07-19 RX ADMIN — ALPRAZOLAM 0.25 MG: 0.25 TABLET ORAL at 23:43

## 2021-07-19 RX ADMIN — HYDRALAZINE HYDROCHLORIDE 25 MG: 25 TABLET ORAL at 05:14

## 2021-07-19 RX ADMIN — LEVOTHYROXINE SODIUM 100 MCG: 0.1 TABLET ORAL at 05:14

## 2021-07-19 RX ADMIN — LEVETIRACETAM 500 MG: 500 TABLET, FILM COATED ORAL at 08:06

## 2021-07-19 RX ADMIN — NITROFURANTOIN MONOHYDRATE/MACROCRYSTALLINE 100 MG: 25; 75 CAPSULE ORAL at 20:20

## 2021-07-19 RX ADMIN — DOCUSATE SODIUM 100 MG: 100 CAPSULE ORAL at 08:06

## 2021-07-19 RX ADMIN — HYDRALAZINE HYDROCHLORIDE 25 MG: 25 TABLET ORAL at 13:12

## 2021-07-19 RX ADMIN — HYDRALAZINE HYDROCHLORIDE 25 MG: 25 TABLET ORAL at 20:22

## 2021-07-19 RX ADMIN — DEXAMETHASONE 2 MG: 4 TABLET ORAL at 08:05

## 2021-07-19 RX ADMIN — LEVETIRACETAM 500 MG: 500 TABLET, FILM COATED ORAL at 20:20

## 2021-07-19 RX ADMIN — Medication 3 MG: at 20:22

## 2021-07-19 RX ADMIN — SERTRALINE 50 MG: 50 TABLET, FILM COATED ORAL at 20:20

## 2021-07-19 RX ADMIN — ATORVASTATIN CALCIUM 10 MG: 10 TABLET, FILM COATED ORAL at 20:20

## 2021-07-19 RX ADMIN — SERTRALINE 50 MG: 50 TABLET, FILM COATED ORAL at 08:06

## 2021-07-19 RX ADMIN — ISOSORBIDE MONONITRATE 30 MG: 30 TABLET ORAL at 08:06

## 2021-07-19 RX ADMIN — NITROFURANTOIN MONOHYDRATE/MACROCRYSTALLINE 100 MG: 25; 75 CAPSULE ORAL at 08:05

## 2021-07-19 ASSESSMENT — ENCOUNTER SYMPTOMS
CHEST TIGHTNESS: 0
APNEA: 0

## 2021-07-19 ASSESSMENT — PAIN SCALES - GENERAL: PAINLEVEL_OUTOF10: 0

## 2021-07-19 NOTE — PROGRESS NOTES
entry  [address]: 4 wheeled walker, [place], [address] [facility] chair, [address] bed   Bathroom Shower/Tub: Tub/Shower unit, Shower chair with back  Bathroom Toilet: Standard  Bathroom Equipment: Shower chair, 3-in-1 commode    Receives Help From: Personal care attendant (Monday-Friday 2 hours)  ADL Assistance: Needs assistance  Homemaking Assistance: Needs assistance  Homemaking Responsibilities: Yes  Ambulation Assistance: Independent  Transfer Assistance: Independent  Active : No  Additional Comments: 2 hours/day DIVINA has an aide that assists with ADLs/IADLs. nieces live nearby and able to help at times. Restrictions/Precautions:  Restrictions/Precautions: General Precautions, Fall Risk  Position Activity Restriction  Other position/activity restrictions: L neglect, R artificial eye     SUBJECTIVE: pt in recliner upon arrival and agrees to therapy. Pt pleasant and cooperative for therapy. PAIN: 0/10: denies    Vitals: Vitals not assessed per clinical judgement, see nursing flowsheet    OBJECTIVE:  Bed Mobility:  Not Tested    Transfers:  Sit to Stand: Supervision  Stand to Sit:Supervision  Car: Supervision    Ambulation:  Stand By Assistance, with cues for safety, with verbal cues   Distance: [id number] and shorter distances around gym  Surface: Level Tile  Device:4 Wheeled Walker  Gait Deviations:   Forward Flexed Posture, Slow Gillian, Decreased Step Length Bilaterally, Decreased Gait Speed, Decreased Heel Strike Bilaterally, Mild Path Deviations, Unsteady Gait, Decreased Terminal Knee Extension and cues to stay closer to AD and stay within frame  Pt did run into door frame- low vision, needs cues and assistance occasionally to navigate    Stairs:  Stand By Assistance, with cues for safety, with verbal cues   Number of Steps: 1  Height: 6\" step with [address] for technique/safety    Stairs:  Stand By Assistance, with verbal cues , with increased time for completion  Number of Steps: 4  Height: 6\" step with Bilateral Handrails  Cues for safety and to complete using step to pattern with descent    Balance:  Dynamic Standing Balance: Contact Guard Assistance, with verbal cues    Pt completed cone weaving with 4ww, pt needing cues and assist with vision to avoid hitting cones. Cues to stay inside RW frame and maintain safety with proximity to AD    TINETTI BALANCE TEST    BALANCE Patient is seated in a hard, armless chair   1 SITTING BALANCE 1 - Steady, safe   2. RISES FROM CHAIR 2 - Able without use of arms   3. ATTEMPTS TO RISE 2 - Able to rise, 1 attempt   4. IMMEDIATE STANDING BALANCE (FIRST 5 SECONDS) 2 - Steady without walker or other support   5. STANDING BALANCE 2 - Narrow stance without support   6. NUDGED 1 - Staggers, grabs, catches   7. EYES CLOSED 1 - Steady   8. TURNING 360 DEGREES 1 - Continuous and 0 - Unsteady (grabs,staggers)   9. SITTING DOWN 2 - Safe,smooth motion   BALANCE SCORE 14/16       GAIT SECTION Patient stands with therapist, walks across room (+/- aids), fist at usual pace, then at rapid pace. 1.  INDICATION OF GAIT (Immediately after told to \"go\" 1 - No hesitancy   2. STEP LENGTH AND HEIGHT 1 - Step through Right and 1 - Step through Left   3. FOOT CLEARANCE 1 - Left foot clears floor and 1 - Right foot clears floor   4. STEP SYMMETRY 1 - Right and left step length appear equal   5. STEP CONTINUITY 1 - Steps appear continuous   6. PATH 1 - Mild/moderate deviation or uses walking aid   7. TRUNK 0 - Marked sway or uses walking aid   8.   WALKING TIME 1 - Heels almost touching while walking   GAIT SCORE 9/12   TOTAL SCORE 23/28   Risk Indicators:  Less than/equal to 18 = high risk  19-23 Moderate risk  Greater than/equal to 24 = low risk Moderate risk           Wheelchair Mobility:  Stand By Assistance, with verbal cues , with increased time for completion  Extremities Used: Bilateral Upper Extremities  Type of Chair:Manual  Surface: Level Tile  Distance: 100ft  Quality: Slow Velocity, Short Strokes, Decreased Fluidity and extra time for turns cues to avoid obstacles      Functional Outcome Measures: Completed       ASSESSMENT:  Assessment: Patient progressing toward established goals, meeting all short term goals and 2/5 long term goals. PT continues to show decreased balance, endurance and functional strength noted by 23/28 indicating moderate risk for falling, but has improved with this. Pt will benefit from continued skilled PT for improved safety and functional mobility skills. Activity Tolerance:  Patient tolerance of  treatment: good. Equipment Recommendations:Equipment Needed: Yes  Other: W/C and 4 WW ordered from [facility]. Discharge Recommendations:  Continue to assess pending progress    Plan: Times per week: 5x/wk 90min, 1x/wk 30min  Times per day: Daily  Current Treatment Recommendations: Strengthening, Transfer Training, Endurance Training, Neuromuscular Re-education, Patient/Caregiver Education & Training, Balance Training, Gait Training, Wheelchair Mobility Training, Home Exercise Program, Functional Mobility Training, Stair training, Safety Education & Training    Patient Education  Patient Education: Plan of Care, Transfers, Gait, Verbal Exercise Instruction    Goals:  Patient goals : go home  Short term goals  Time Frame for Short term goals: 1 week  Short term goal 1: Patient will complete supine < > sit with modified independence to transfer in/out of bed safely. GOAL MET  Short term goal 2: Patient will complete sit < > stand with SBA and verbal cues to less than or equal to 50% of the time to stand to ambulate with decreased difficulty. MET SEE LTG  Short term goal 3: Patient will ambulate 76' with a RW and CGA to progress towards ambulating household distances safely. GOAL MET, SEE LTG  Short term goal 4: Patient will propel wheelchair 100' with SBA to increase independence and mobility.  MET SEE LTG  Long term goals  Time Frame for Long term goals : 4 weeks  Long term goal 1: Patient will complete sit < > stand and stand pivot transfers with modified independence to transfer surface to surface safely. NOT MET  Long term goal 2: Patient will ambulate 80' with a RW and supervision to navigate home with decreased difficulty. NOT MET  Long term goal 3: Patient will improve tinetti to greater than or equal to 19/28 to reduce her risk for falls. MET  Long term goal 4: Patient will ascend/descend 1 step with a RW and CGA for community entry. MET  Long term goal 5: Patient will propel wheelchair 100' with modified independence to navigate living envionment/community safely NOT MET    Following session, patient left in safe position with all fall risk precautions in place. Treatment session and note completed by Jarret Peña PTA.   Assessment and goal revision of Progress Note completed by Markus Cartwright PT.

## 2021-07-19 NOTE — PLAN OF CARE
Problem: Falls - Risk of:  Goal: Will remain free from falls  Description: Will remain free from falls  7/19/2021 0207 by Jamal Lowe RN  Outcome: Ongoing  Uses the call light appropriately and waits for staff assistance. Problem: Skin Integrity:  Goal: Will show no infection signs and symptoms  Description: Will show no infection signs and symptoms  7/19/2021 0207 by Jamal Lowe RN  Outcome: Ongoing  No s/s of infection noted this shift. 7/18/2021 1528 by Eliana Catalan LPN  Outcome: Ongoing  Note: Patient free from signs and symptoms of skin infection. Patient is afebrile. Problem: Pain:  Goal: Pain level will decrease  Description: Pain level will decrease  7/19/2021 0207 by Jamal Lowe RN  Outcome: Ongoing  No pain noted this shift. 7/18/2021 1528 by Eliana Catalan LPN  Outcome: Ongoing  Note: Patient denies pain throughout shift.

## 2021-07-19 NOTE — PROGRESS NOTES
[address] [facility]  Hematology/ Oncology Progress Note     Pt Name: Acosta Lai  MRN: [id number]  [id number]  YOB: 1933  Admit Date: 7/8/2021  2:41 PM  Date of evaluation: 7/19/2021  Primary Care Physician: Bruce Pleitez MD   [id number]       SUBJECTIVE: Doing good. OBJECTIVE    Physical  VITALS:  BP (!) 124/58   Pulse 70   Temp 97.6 °F (36.4 °C) (Oral)   Resp 16   Ht 5' (1.524 m)   Wt 134 lb 3.2 oz (60.9 kg)   LMP  (LMP Unknown)   SpO2 93%   BMI 26.21 kg/m²   CONSTITUTIONAL:  awake  EYES:  ENT:  normocepalic, without obvious abnormality  NECK:  Supple, symmetrical, trachea midline, no adenopathy, thyroid symmetric, not enlarged and no tenderness, skin normal  HEMATOLOGIC/LYMPHATICS:  no cervical lymphadenopathy and no supraclavicular lymphadenopathy  LUNGS:  No increased work of breathing, good air exchange, clear to auscultation bilaterally, no crackles or wheezing  CARDIOVASCULAR:  normal apical pulses  ABDOMEN:  Soft Non Tender.   NEUROLOGIC:  Non Focal .  Data  Labs:  General Labs:    CBC:   Lab Results   Component Value Date    WBC 8.8 07/19/2021    RBC 3.73 07/19/2021    RBC 4.23 04/08/2019    HGB 11.1 07/19/2021    HCT 37.0 07/19/2021    MCV 99.2 07/19/2021    MCH 29.8 07/19/2021    MCHC 30.0 07/19/2021    RDW 17.0 04/08/2019     07/19/2021    MPV 11.6 07/19/2021     CBC with Differential:    Lab Results   Component Value Date    WBC 8.8 07/19/2021    RBC 3.73 07/19/2021    RBC 4.23 04/08/2019    HGB 11.1 07/19/2021    HCT 37.0 07/19/2021     07/19/2021    MCV 99.2 07/19/2021    MCH 29.8 07/19/2021    MCHC 30.0 07/19/2021    RDW 17.0 04/08/2019    NRBC 0 07/03/2021    SEGSPCT 90.9 07/03/2021    LYMPHOPCT 21.3 04/08/2019    MONOPCT 3.8 07/03/2021    MONOPCT 9.2 07/23/2018    EOSPCT 0.9 04/08/2019    BASOPCT 0.1 04/08/2019    MONOSABS 0.6 07/03/2021    LYMPHSABS 0.7 07/03/2021    EOSABS 0.0 07/03/2021    BASOSABS 0.0 07/03/2021     Current Medications  Current Facility-Administered Medications: muscle rub cream, , Topical, PRN  dexamethasone (DECADRON) tablet 2 mg, 2 mg, Oral, BID  nitrofurantoin (macrocrystal-monohydrate) (MACROBID) capsule 100 mg, 100 mg, Oral, 2 times per day  melatonin tablet 3 mg, 3 mg, Oral, Nightly PRN  acetaminophen (TYLENOL) tablet 650 mg, 650 mg, Oral, Q4H PRN  magnesium hydroxide (MILK OF MAGNESIA) 400 MG/5ML suspension 30 mL, 30 mL, Oral, Daily PRN  ondansetron (ZOFRAN-ODT) disintegrating tablet 4 mg, 4 mg, Oral, Q8H PRN **OR** [DISCONTINUED] ondansetron (ZOFRAN) injection 4 mg, 4 mg, Intravenous, Q6H PRN  fleet rectal enema 1 enema, 1 enema, Rectal, Daily PRN  lidocaine 4 % external patch 2 patch, 2 patch, Topical, Daily  polyethylene glycol (GLYCOLAX) packet 17 g, 17 g, Oral, Daily  ALPRAZolam (XANAX) tablet 0.25 mg, 0.25 mg, Oral, TID PRN  atorvastatin (LIPITOR) tablet 10 mg, 10 mg, Oral, Nightly  dextrose 5 % solution, 100 mL/hr, Intravenous, PRN  dextrose 50 % IV solution, 12.5 g, Intravenous, PRN  docusate sodium (COLACE) capsule 100 mg, 100 mg, Oral, BID  glucagon (rDNA) injection 1 mg, 1 mg, Intramuscular, PRN  glucose (GLUTOSE) 40 % oral gel 15 g, 15 g, Oral, PRN  hydrALAZINE (APRESOLINE) tablet 25 mg, 25 mg, Oral, 3 times per day  insulin lispro (HUMALOG) injection vial 0-6 Units, 0-6 Units, Subcutaneous, TID WC  insulin lispro (HUMALOG) injection vial 0-3 Units, 0-3 Units, Subcutaneous, Nightly  isosorbide mononitrate (IMDUR) extended release tablet 30 mg, 30 mg, Oral, Daily  levETIRAcetam (KEPPRA) tablet 500 mg, 500 mg, Oral, BID  levothyroxine (SYNTHROID) tablet 100 mcg, 100 mcg, Oral, Daily  pantoprazole (PROTONIX) tablet 40 mg, 40 mg, Oral, QAM AC  sertraline (ZOLOFT) tablet 50 mg, 50 mg, Oral, BID    ASSESSMENT AND PLAN  Patient Active Problem List:     GERD (gastroesophageal reflux disease)     Temporal arteritis (HCC)     Hypercholesteremia     Hypothyroidism due to acquired atrophy of thyroid Steroid-induced hyperglycemia     Blind right eye     Current chronic use of systemic steroids     Personal history of cardiac dysrhythmia     Coronary artery disease involving native coronary artery of native heart without angina pectoris     Anxiety     H/O: CVA (cerebrovascular accident)     Lumbosacral spinal stenosis     Vitamin D deficiency     Hx of breast cancer with liver mets     Breast cancer metastasized to liver [facility])     Fall     Traumatic ecchymosis of forehead     Brain mass     Subarachnoid hemorrhage following injury, no loss of consciousness (HCC)     Platelet inhibition due to Plavix     Fall at home, initial encounter     Brain edema [facility])     Intraparenchymal hemorrhage of brain ([facility]     Closed head injury     Breast cancer metastasized to brain, right [facility])     Status post craniotomy     Impaired functional mobility, balance, gait, and endurance     Acute cystitis without hematuria    A 1 Breast Cancer Diagnosed in 2016 with Liver mets. With Complete Remission therapy 2 years       Ago.     2 Brain met from breast primary ER/DE negative. Her 2 positive S/P resection      3 Thrombocytopenia.      4 Negative CT Chest, Abdomen and Pelvis for metastatic Disease. Plan[de-identified]   Lower Decadron to 2 mg  daily.             ULTKI Platelet count.              Will give Enhertu therapy once discharged. It crosses blood brain barrier.                F/U on 7/22/21 in the office.                  Miriam Garcia, INÉS,CWS  5:00 PM  7/19/2021

## 2021-07-19 NOTE — PROGRESS NOTES
Physical Medicine & Rehabilitation   Progress Note    Chief Complaint:  Brain mass. Rehab needs    Subjective:  Patient seen up in therapy gym. Patient happy with therapy progress and excited to get to go home this week. Patient asking about caner treatment and setting up appt. SW working on this today. Patient denies any other needs at this time. Niece continues to help with patient's dog, patient excited to see her dog as well this week. +BM 7/16/21    Rehabilitation:  PT:   Bed Mobility:  Supine to Sit: Stand By Assistance  Scooting: Stand By Assistance, with head of bed raised, with rail  Transfers:  Sit to Stand: Contact Guard Assistance  Stand to [employer] Assistance  Ambulation:  Contact Guard Assistance, with cues for safety, with verbal cues , with increased time for completion  Distance: 200ft  Surface: Level Tile  Device:4 Wheeled Walker  Gait Deviations: Forward Flexed Posture, Slow Gillian, Decreased Step Length Bilaterally, Decreased Gait Speed, Decreased Heel Strike Bilaterally, Mild Path Deviations and Unsteady Gait  Balance:  Dynamic Sitting Balance: Stand By Assistance  Dynamic Standing Balance: Stand By Assistance, Contact Guard Assistance  Pt completed functional tasks in bathroom with A for stability and task completion. Pt needing cues due to limited vision- pt also appears to be confused at times needing direction (pt looking behind toilet for her dentures, etc.) pt reaching in all directions with isa UEs, min unsteady without LOb. Pt also stood at sink and completed double hand release tasks. Pt needing assistance with task completion. Pt stood ~5mins at sink  Exercise:  Patient was guided in 1 set(s) 10 reps of exercise to both lower extremities. Ankle pumps, Glut sets, Seated marches, Long arc quads and Seated abduction/adduction. Exercises were completed for increased independence with functional mobility. OT:  BALANCE:  Standing Balance: Stand By Assistance.  with using RW with pt standing for 17 minutes while completing visual scanning activity standing. Pt able to correctly identifiy 22/26 letters with min prompts for left to right scanning, and for completion of circling letters. TRANSFERS:  Sit to Stand:  Stand By Assistance. for safety  Stand to Sit: Stand By Assistance. for safety  FUNCTIONAL MOBILITY:  Assistive Device: rollator walker  Assist Level:  Contact Guard Assistance. Distance: To and from therapy apartment  With vc's for walker navigation due to pt veering to the left more   ADDITIONAL ACTIVITIES:  Pt stated she felt dizzy at the end of session this date, with vital signs WNL. RN made aware with checking BS, with pt's sugar at 120. Pt advised to sit and rest until lunch. ST:   INTERVENTIONS:   Orientation  Vicky 5- independent  Month - independent  KINGSTON - independent   Date - independent   Time - min cues      Immediate/Delayed Recall  ST completed review of STM strategies using the acronym WRAP--Write it down, Repeat it, Associate it, and Pictures it. ST then provided patient with 4 new units of information related to Tod George, Speech Therapist, Ryan Armendariz. ST then wrote 4 new units of information in large print on a piece of paper. Patient completed repetition of list x4 and associated 4 units of information when given min verbal cues     Immediate Recall: 3/4 independently, 1/4 min cues   Delayed Recall (15 minute):  4/4 with utilization of list  *patient unable to independently recall 4 units of information following a shift in attention; however, patient independently recalled that she utilized list, located list, and read list to identify 4 new units of information. INTERVENTIONS:   Call Light  Patient independently identified use of call light if assistance is needed. Patient independently identified 2/2 scenarios to utilize call light.    Patient independently then identified situations patient would utilize call light 4/4 trials when given yes/no questions      Reasoning related to safety within immediate environment:   6/6 independently      Safety Awareness - Home  Patient independently answered safety awareness questions related to a fire, a fall, and illness 3/3 trials.       *patient demonstrating good safety awareness within basic problem solving, reasoning, and safety tasks this date; however, continued concern for poor carryover of safety awareness within scenarios. *concerned for increased impulsivity d/t setting off chair alarm ~ 4x within a 10 minute time period.    *ST discussed safety concerns with niece following session related to impulsivity, poor insight, and visual deficit which may impact safe return home.      Prior Session  Functional problem solving/comprehension of already set up pill box:   Determining appropriate slot for current KINGSTON: indep   Locating ALL tabs for Wednesday: required max assist  Determining next appropriate Wednesday tab according to current TOD: moderate assist   Determining mistakes made on pill box: 1/2 mod cues, 1/2 Total A   *severely poor success given decreased visual acuity, poor utilization of tactile skills/feedback and significantly poor mental flexibility with rigid thinking present           Review of Systems:  CONSTITUTIONAL:  positive for  fatigue  EYES: blind right eye, cataracts left  HEENT:  negative  RESPIRATORY:  negative  CARDIOVASCULAR:  negative  GASTROINTESTINAL:  Negative  GENITOURINARY:  garcia  SKIN:  Bruising, right facial  HEMATOLOGIC/LYMPHATIC:  positive for swelling/edema  MUSCULOSKELETAL:  positive for  muscle weakness  NEUROLOGICAL:  positive for gait problems and weakness  BEHAVIOR/PSYCH:  negative  System review otherwise negative      Objective:  BP (!) 149/66   Pulse 60   Temp 97.6 °F (36.4 °C) (Oral)   Resp 16   Ht 5' (1.524 m)   Wt 134 lb 3.2 oz (60.9 kg)   LMP  (LMP Unknown)   SpO2 96%   BMI 26.21 kg/m² awake  Orientation:   person, place, time  Mood: within normal limits  Affect: calm  General appearance: Patient is well nourished, well developed, well groomed and in no acute distress     Memory:   normal,   Attention/Concentration: normal  Language:  normal     Cranial Nerves:  cranial nerves II-XII are grossly intact except chronic vision issues with right eye false and left eye cataracts  ROM:  normal  Tone:  normal  Muscle bulk: within normal limits  Sensory:  Sensory intact     Skin: warm and dry, no rash or erythema. Right facial bruising improving.  Left hand bruising from recent blood draw  Peripheral vascular: Pulses: Normal upper and lower extremity pulses; Edema: trace    Diagnostics:   Recent Results (from the past 24 hour(s))   POCT glucose    Collection Time: 07/18/21 12:03 PM   Result Value Ref Range    POC Glucose 118 (H) 70 - 108 mg/dl   POCT glucose    Collection Time: 07/18/21  5:00 PM   Result Value Ref Range    POC Glucose 118 (H) 70 - 108 mg/dl   POCT glucose    Collection Time: 07/18/21  8:25 PM   Result Value Ref Range    POC Glucose 136 (H) 70 - 108 mg/dl   CBC    Collection Time: 07/19/21  6:45 AM   Result Value Ref Range    WBC 8.8 4.8 - 10.8 thou/mm3    RBC 3.73 (L) 4.20 - 5.40 mill/mm3    Hemoglobin 11.1 (L) 12.0 - 16.0 gm/dl    Hematocrit 37.0 37.0 - 47.0 %    MCV 99.2 (H) 81.0 - 99.0 fL    MCH 29.8 26.0 - 33.0 pg    MCHC 30.0 (L) 32.2 - 35.5 gm/dl    RDW-CV 14.3 11.5 - 14.5 %    RDW-SD 52.5 (H) 35.0 - 45.0 fL    Platelets 510 (L) 801 - 400 thou/mm3    MPV 11.6 9.4 - 12.4 fL   POCT glucose    Collection Time: 07/19/21  7:35 AM   Result Value Ref Range    POC Glucose 83 70 - 108 mg/dl       Impression:  · Right brain mass - metastatic from breast  · Fall  · CHI with facial bruising  · Subarachnoid and parenchymal hemorrhage  · Traumatic ecchymosis to left lower leg and left forearm, right forehead ecchymosis  · Bilateral lower extremity weakness  · Hypotension  · Anxiety  · GERD  · SVT  · HLD  · Hyperglycemia, steroid induced  · Hx Breast cancer with mets to the liver    Plan:  Continue current therapies  Prophylaxis: DVT - SCDs/BARRY due to brain bleed, GI - Protonix  Pain: tylenol, lidoderm,   Bowels: Colace, fleets, MOM, miralax. +BM 7/15/21  Seizure: keppra  Anxiety: xanax, zoloft  Melatonin at night PRN  Ok for OTC spray for leg pain at night  Oncology decreasing decadron and plans Enhertu therapy once discharged  Menthol cream to neck PRN  · Conference tuesdays   · Discharge planning : 7/21 HH- RN, PT, OT, ST, HHA      Missed Therapy Time:  · None    PAMELA Vazquez CNP         Acosta Lai was evaluated today and a DME order was entered for a lightweight wheelchair because she requires this to successfully complete daily living tasks of bathing, toileting, personal cares, grooming and meal preparation. A lightweight wheelchair is necessary due to the patient's impaired ambulation and mobility restrictions. The patient would not be able to resolve these daily living tasks using a cane or walker. The patient can self-propel a lightweight wheelchair safely in their home and can maneuver within their home with adequate access. The patient cannot self-propel in a standard wheelchair. The need for this equipment was discussed with the patient and she understands, is in agreement, and has not expressed an unwillingness to use the wheelchair.       Latanya Rod requires a foam seat cushion for their wheelchair to provide adequate pressure relief for maintenance of proper skin integrity.     Electronically signed by PAMELA Cornejo CNP on 7/19/2021 at 9:50 AM

## 2021-07-19 NOTE — FLOWSHEET NOTE
[facility] PROGRESS NOTE      Patient: Eloisa Fortune  Room #: 6H-23/772-T            YOB: 1933  Age: 80 y.o. Gender: female            Admit Date & Time: 7/8/2021  2:41 PM    Assessment:  Pt is an 88y. o. female, sitting in easychair watching television, in 7E[address]64. Anuj Santos is pleasant, approachable and coping with metastatic cancer, necessitating chemotherapy upcoming soon. She shared that her family has been great support, visiting often and that her  has been as well. She shared her concerns with the current state of the nation, and has been praying hard for guidance within it, in a 'what shall I do' sense. She said she's sensing a leading to organize a Bible study for children in her neighborhood, sharing her fears for the atmosphere our children are growing up in. She shared with a serious face, about her coming chemotherapy, resigned to it but mentioned 'all I can do is pray, and I have been, a lot about it' then smiled. Interventions:   provided active listening, conversation, questioned her on her sense with the Bible study, nurtured hope, encouragement and prayer. Outcomes:  Anuj Santos grabbed my hands as we prayed, became a bit emotional and prayed as well. She expressed enthusiastic gratitude for our visit and prayers. Plan:  1. She may be discharged Wednesday; perhaps follow-up with her mindset on upcoming chemotherapy and fleshing out the Bible study mention.     Electronically signed by Saturnino Jackson on 7/19/2021 at 7:36 PM.  [address] [facility]  [phone]       07/19/21 1815   Encounter Summary   Services provided to: Patient   Referral/Consult From: [address] Family members   Place of [address] [place] [address]   Continue Visiting Yes  (7/19)   Complexity of Encounter Moderate   Length of Encounter 15 minutes   Spiritual/Congregational   Type

## 2021-07-19 NOTE — PROGRESS NOTES
glucagon (rDNA) injection 1 mg  1 mg Intramuscular PRN Modesta An MD        glucose (GLUTOSE) 40 % oral gel 15 g  15 g Oral PRN Modesta An MD        hydrALAZINE (APRESOLINE) tablet 25 mg  25 mg Oral 3 times per day Modesta An MD   25 mg at 21    insulin lispro (HUMALOG) injection vial 0-6 Units  0-6 Units Subcutaneous TID WC Modesta An MD   1 Units at 07/15/21 1822    insulin lispro (HUMALOG) injection vial 0-3 Units  0-3 Units Subcutaneous Nightly Modesta An MD   1 Units at 21    isosorbide mononitrate (IMDUR) extended release tablet 30 mg  30 mg Oral Daily Modesta An MD   30 mg at 21    levETIRAcetam (KEPPRA) tablet 500 mg  500 mg Oral BID Modesta An MD   500 mg at 21    levothyroxine (SYNTHROID) tablet 100 mcg  100 mcg Oral Daily Modesta An MD   100 mcg at 21 0500    pantoprazole (PROTONIX) tablet 40 mg  40 mg Oral QAM AC Modesta An MD   40 mg at 21 0500    sertraline (ZOLOFT) tablet 50 mg  50 mg Oral BID Modesta An MD   50 mg at 21      Review of Systems   Constitutional: Negative for activity change and appetite change. HENT: Negative for congestion, ear discharge and ear pain. Respiratory: Negative for apnea and chest tightness. Genitourinary: Negative for difficulty urinating and dysuria. Musculoskeletal: Positive for gait problem. Neurological: Negative for dizziness, tremors, weakness and light-headedness.      Objective         Vitals Last 24 Hours:  TEMPERATURE:  Temp  Av.2 °F (36.8 °C)  Min: 98.1 °F (36.7 °C)  Max: 98.2 °F (36.8 °C)  RESPIRATIONS RANGE: Resp  Av  Min: 16  Max: 18  PULSE OXIMETRY RANGE: SpO2  Av.5 %  Min: 95 %  Max: 96 %  PULSE RANGE: Pulse  Av.5  Min: 63  Max: 78  BLOOD PRESSURE RANGE: Systolic (13ZPS), YXS:900 , Min:106 , BLB:679   ; Diastolic (20EHU), LZV:62, Min:51, Max:65    I/O (24Hr): Intake/Output Summary (Last 24 hours) at 7/19/2021 0044  Last data filed at 7/18/2021 1845  Gross per 24 hour   Intake 817 ml   Output --   Net 817 ml     Objective:  General Appearance:  Comfortable. Vital signs: (most recent): Blood pressure (!) 122/59, pulse 78, temperature 98.1 °F (36.7 °C), temperature source Oral, resp. rate 18, height 5' (1.524 m), weight 134 lb 3.2 oz (60.9 kg), SpO2 96 %, not currently breastfeeding. Vital signs are normal.    Output: Producing urine and producing stool. HEENT: Normal HEENT exam.    Lungs:  Normal effort. There are decreased breath sounds. Heart: Normal rate. Regular rhythm. S1 normal and S2 normal.  No murmur. Abdomen: Abdomen is rigid and non-distended. Bowel sounds are normal.     Extremities: Normal range of motion. There is no deformity. Neurological: Patient is alert and oriented to person, place and time. Patient has normal reflexes and normal muscle tone. Skin:  Warm and dry. Labs/Imaging/Diagnostics    Labs:  CBC:  Recent Labs     07/16/21  0707 07/17/21  0658 07/18/21  0822   WBC 9.4 9.2 10.4   RBC 3.71* 3.76* 4.02*   HGB 11.2* 11.4* 12.0   HCT 36.5* 36.9* 40.5   MCV 98.4 98.1 100.7*   * 128* 136     CHEMISTRIES:No results for input(s): NA, K, CL, CO2, BUN, CREATININE, GLUCOSE, PHOS, MG in the last 72 hours. Invalid input(s): CA  PT/INR:No results for input(s): PROTIME, INR in the last 72 hours. APTT:No results for input(s): APTT in the last 72 hours. LIVER PROFILE:No results for input(s): AST, ALT, BILIDIR, BILITOT, ALKPHOS in the last 72 hours. Imaging Last 24 Hours:  No results found.   Assessment//Plan           Hospital Problems         Last Modified POA    Breast cancer metastasized to brain, right ([address] 7/8/2021 Yes    Status post craniotomy 7/9/2021 Yes    Impaired functional mobility, balance, gait, and endurance 7/9/2021 Yes    GERD (gastroesophageal reflux disease) 7/9/2021 Yes    Temporal arteritis ([facility] 7/9/2021 Yes    Hypothyroidism due to acquired atrophy of thyroid 7/9/2021 Yes    Blind right eye 7/9/2021 Yes    Current chronic use of systemic steroids 7/9/2021 Yes    Acute cystitis without hematuria 7/15/2021 Yes    Coronary artery disease involving native coronary artery of native heart without angina pectoris 7/9/2021 Yes    Anxiety 7/9/2021 Yes    Fall 7/9/2021 Yes    Subarachnoid hemorrhage following injury, no loss of consciousness [facility] 7/9/2021 Yes    Fall at home, initial encounter 7/9/2021 Yes        Assessment:    Condition: Improving. (Breast cancer with brain mets post resection  Noted    htn stable      gerd stable      ashd  stable      thyroid stable      lipids stable ). Plan:   Per physical therapy. Consults: physical therapy and occupational therapy. Administer medications as ordered. ( stable      continue  meds                  continue  With pt and ot and rehab ).        Electronically signed by Mamadou Donaldson MD on 7/19/21 at 12:44 AM EDT

## 2021-07-19 NOTE — PLAN OF CARE
Problem: DISCHARGE BARRIERS  Goal: Patient's continuum of care needs are met  Note: ALEX message from patient's Katerine angel. SW contacted patient's Katerine angel, at [phone]. No answer, left message requesting returned phone call. ALEX received message from Eli thurman, over the weekend. ALEX contacted Eli thurman, at [phone]. No answer, left message requesting returned phone call. ALEX contacted cancer center [facility], at ext. [phone], to discuss patient's plan of care and potential needs as treatment continues and disease progresses. No answer, left message requesting returned phone call. SW to follow and maintain involvement in discharge planning.

## 2021-07-19 NOTE — PROGRESS NOTES
for safe technqiue and left neglect   Upper Extremity Dressing: Stand By Assistance. donning/doffing bra and shirt, min difficulty orienting shirt   Lower Extremity Dressing: Stand By Assistance. vcs for safe technique when standing to pull up over hips   Toileting: Stand By Assistance. increased time to complete d/t patient having multiple loose stools   Toilet Transfer: Stand By Assistance. Derek Rateltonjovanny BALANCE:  Sitting Balance:  Modified Independent. Standing Balance: Stand By Assistance. BED MOBILITY:  Not Tested    TRANSFERS:  Sit to Stand:  Stand By Assistance, cues for hand placement. Stand to Sit: Stand By Assistance, cues for hand placement. FUNCTIONAL MOBILITY:  Assistive Device: 4 Wheeled Walker  Assist Level:  Stand By Assistance. Distance: To and from bathroom     ASSESSMENT:  Activity Tolerance:  Patient tolerance of  treatment: fair. Assessment: Assessment: Pt is making good progress towards goals. Patient has met 4/4STG goals 0/5 LTG. Patient has progressed to SBA during BADL tasks and SBA with LB dressing. Pt continues to require skilled Occupational Therapy to address the following performance deficits balance, endurance, safety awareness, and activity tolerance. Without skilled OT intervention pt is at risk for functional decline, increased falls, and readmission to hospital.  Discharge Recommendations: Patient would benefit from continued therapy after discharge, Continue to assess pending progress, Home with Home health OT  Equipment Recommendations:  Other: 4 wheeled walker  Plan: Times per week: 5x/wk for 90 min & 1x/wk for 30 min  Current Treatment Recommendations: Strengthening, Balance Training, Neuromuscular Re-education, Functional Mobility Training, Endurance Training, Home Management Training, Patient/Caregiver Education & Training, Self-Care / ADL, Safety Education & Training  Plan Comment: Pt demonstrates decline from baseline, requiring increased assistance due to

## 2021-07-19 NOTE — PROGRESS NOTES
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 Patient scheduled for discharge home on Wednesday, 2021. Patient will return home alone with support from skilled and non-skilled home health care services through [facility] [address] Patient's niarianna, Sanjiv Haywood, and other family to provide as much support as possible, however, not available for twenty four hour care. Sanjiv Tavares, scheduled to complete family education on Wednesday, 2021, beginning at 1:30pm with discharge to follow. SW to follow and maintain involvement in discharge planning. PHYSICAL THERAPY  Assessment: Patient progressing toward established goals, meeting all short term goals and 2/5 long term goals. PT continues to show decreased balance, endurance and functional strength noted by  indicating moderate risk for falling, but has improved with this. Pt will benefit from continued skilled PT for improved safety and functional mobility skills. Equipment Needed: Yes  Other: W/C and 4 WW ordered from E. SPEECH THERAPY  Patient has met 4/5 STGs and 1/2 LTGs this reporting period. Patient has demonstrated improvements in the areas of attention, sequencing, thought organization, problem solving, reasoning, and executive functioning.  Patient continues to demonstrate evidence of increased difficulty with immediate/delayed recall, mental manipulation, attention, moderately complex executive functioning, problem solving, reasoning, and thought organization tasks. Patient is safely and efficiently consuming a regular texture diet and thin liquids with no reports of overt s/s of aspiration. Prior to hospitalization, patient lived independently at home with assistance managing finances, medications, and completing ADLs. Patient would greatly benefit from completion of further skilled ST services to address aforementioned deficits in order to safely discharge home and complete ADLs/IADLs with least amount of supervision/assistance. OCCUPATIONAL THERAPY  Pt is making good progress towards goals. Patient has met 4/4STG goals 0/5 LTG. Patient has progressed to SBA during BADL tasks and SBA with LB dressing. Pt continues to require skilled Occupational Therapy to address the following performance deficits balance, endurance, safety awareness, and activity tolerance. Without skilled OT intervention pt is at risk for functional decline, increased falls, and readmission to hospital.  Other: 4 wheeled walker    RECREATIONAL THERAPY  Patient has been offered participation in recreational therapy activities and participates as able. upon arrival pt requesting to rest in recliner-comfort measures given -too tired to come to our craft group this am-very pleasant and appreciative - pt declined playing [employer] with peers this am stating she is waiting for her niece to arrive with more clothes for her to wear while here-pleasant-affect bright - Speech Therapy was with pt at this time- RT student will check in with pt later if time allows -     NUTRITION  Weight: 134 lb 3.2 oz (60.9 kg) / Body mass index is 26.21 kg/m². Current diet: ADULT DIET; Regular  Adult Oral Nutrition Supplement; Standard High Calorie/High Protein Oral Supplement  Please see nutrition note for details. NURSING  Continent of Bowel: Yes. Frequency: every other day . Management: colace BID, fleet enema PRN, MOM PRN, Glycolax Daily-refuses . Continent of Bladder: Yes.   Frequency: Times of inconntinence at night .  Management: None. Pain is Managed:  Yes. Management: Tylenol PRN. Frequency of Intervention: Once daily . Adequately Controlled: Yes  Sleep: Adequate  Signs and Symptoms of Infection:  Yes. Site of Infection: UTI. Antibiotic: Macrobid . Signs and Symptoms of Skin Breakdown:  No.   Injury and Fall Free during Inpatient Rehabilitation Admission: No  Anticoagulants: None  Diabetic: No  Consultations/Labs/X-rays: None    Recent Labs     07/19/21  1705 07/19/21 2029 07/20/21  0814   POCGLU 97 108 74       Lab Results   Component Value Date    LDLCALC 59 08/21/2020         Vitals:    07/19/21 1615 07/19/21 2015 07/20/21 0553 07/20/21 0824   BP: (!) 124/58 (!) 109/52 (!) 115/53 (!) 112/57   Pulse: 70 70  67   Resp: 16 18  16   Temp:  97.6 °F (36.4 °C)  98 °F (36.7 °C)   TempSrc:  Oral     SpO2: 93% 96%  94%   Weight:       Height:              Family Education: Family available and participating in education   Fall Risk:  Falling star program initiated  Is the patient appropriate for a stay in the functional apartment? no    Discharge Plan   Estimated Discharge Date: tomorrow, 7/21/21   Destination: discharge home with supervision  Services at Discharge: [address], Occupational Therapy, Speech Therapy, Nursing, ,  and HHA 3x week  Is patient appropriate for an outpatient driving evaluation? no  Equipment at Discharge: Other: 4 wheeled walker  Other: W/C and 4 [employer] ordered from [address] [place] [address].   Factors facilitating achievement of predicted outcomes: Family support, Friend support, Motivated, Cooperative and Pleasant  Barriers to the achievement of predicted outcomes: Limited family support, Limited safety awareness, Depression, Anxiety, Limited insight into deficits and Decreased endurance    Team Members Present at Conference:  :Jose Eduardo Puga 855, OTR/L [facility]  Physical Therapist:Sabrina Johnson Dr, [facility]  [address] [facility] MS Lizabeth, CCC-SLP 0052  Nurse:Tamela Olson, RN      I approve the established interdisciplinary plan of care as documented within the medical record of [address].     Ida Goins MD

## 2021-07-19 NOTE — PROGRESS NOTES
[address] [address]  INPATIENT SPEECH THERAPY  [address]  PROGRESS NOTE    TIME   SLP Individual Minutes  Time In: 1330  Time Out: 1400  Minutes: 30  Timed Code Treatment Minutes: 30 Minutes        Date: 2021  Patient Name: Elridge Mcardle      CSN: [id number]   : 1933  (80 y.o.)  Gender: female   Referring Physician: Marly Chin MD  Diagnosis: S/p craniotomy   Secondary Diagnosis: Dysphagia, cognitive linguistic deficits  Precautions: Fall risk, aspiration precautions   Current Diet: Regular, thin liquids   Swallowing Strategies: Full Upright Position, Small Bite/Sip and Alternate Solids and Liquids     Date of Last MBS/FEES: Not Applicable    Pain:  No pain reported. Subjective:  Upon arrival to room, patient sitting upright in recliner, awake with  Lucas Gandhi at bedside. Patient alert, cooperative, and pleasant throughout session this date. Short-Term Goals:  SHORT TERM GOAL #1:  Goal 1: Pt will consume a regular diet and thin liquids without overt s/s of aspiration to meet nutritional/hydration measures safely. -GOAL MET; CONTINUE FOR CONSISTENCY  INTERVENTIONS: Did not address this session       SHORT TERM GOAL #2:  Goal 2: Patient will complete functional immediate/delayed/prospective recall tasks (inclusion of compensatory strategies) with 80% accuracy, min cues to improve retention of pertinent information. - GOAL NOT MET; CONTINUE   INTERVENTIONS:   [facility]- independent  Month - independent  [place] - independent   Date - min cues    Time of Day - independent   Clock time - independent      Delayed Recall of Lunch  6/9 independently, 1/9 given min cues, 2/9 given mod cues     Prior Session  Immediate/Delayed Recall  ST completed review of STM strategies using the acronym WRAP--Write it down, Repeat it, Associate it, and Pictures it.  ST then provided patient with 4 new units of information related to [address], Speech Therapist, Jose Medina [address] then wrote 4 new units of information in large print on a piece of paper. Patient completed repetition of list x4 and associated 4 units of information when given min verbal cues    Immediate Recall: 3/4 independently, 1/4 min cues   Delayed Recall (15 minute):  4/4 with utilization of list  *patient unable to independently recall 4 units of information following a shift in attention; however, patient independently recalled that she utilized list, located list, and read list to identify 4 new units of information. SHORT TERM GOAL #3:  Goal 3: Pt will complete problem solving, verbal/visual reasoning, and executive functioning tasks (safety time, basic money/math comprehensive of preset up pill box) with 70% accuracy, mod cues to improve contribution within ADLs/IADLs. -GOAL MET; REVISE   REVISED Goal 3: Pt will complete problem solving, verbal/visual reasoning, and executive functioning tasks (safety time, basic money/math comprehensive of preset up pill box) with 80% accuracy, min cues to improve contribution within ADLs/IADLs. INTERVENTIONS:   Counting Dollar Bills   2/5 independently, 2/5 given min cues, 1/5 given mod cues    Counting Dollar Bills for [employer]   $37 - max    $25 - independent  $18 - independent  $64 - independent   $36 - independent     *decreased working memory and immediate recall  *increased difficulty s/t visual def      SHORT TERM GOAL #4:  Goal 4: Pt will complete sequencing and thought organization tasks (i.e. divergent naming, multi step commands, complex yes/no questions) with 70% accuracy, mod cues to improve mental flexibility and processing speed. - GOAL MET; REVISE   REVISED Goal 4: Pt will complete sequencing and thought organization tasks (i.e. divergent naming, multi step commands, complex yes/no questions) with 80% accuracy, min cues to improve mental flexibility and processing speed.    INTERVENTIONS: Did not address this session d/t focus on other goals. Prior Session  Patient independently generated 6 item ingredient list for homemade fried chicken    Patient then sequenced 7 steps to cook fried chicken: 4/7 steps independently, 3/7 steps given min cues        ST generated a 6 item grocery list. Patient was prompted to identify which department each item was located. Patient correctly identified department 5/6 trials independently, 1/6 given mod verbal cues d/t word finding difficulty with \"dairy\". *adequate thought organization   *slow processing speed observed   *increased difficulty possibly s/t visual impairment - poor working memory which impacts accuracy with verbally presented information. ST then prompted patient to chunk items on list that would be in the same department. Patient chunked items together 2/6 independently and 4/6 given mod-max cues. *poor success with task   *decreased comprehension of task - patient listing items not on list that would be found in dairy department, meat department, and produce department. SHORT TERM GOAL #5:  Goal 5: Pt will complete functional attention tasks (i.e. sustained, selective, alternating) with no more than x3-4 errors/redirections within task completion for successful management of ADL's post discharge. - GOAL MET; CONTINUE FOR CONSISTENCY  INTERVENTIONS:   Pt was prompted to say \"bonifacio\" for red suit and \"halloween\" for black suit. Pt independently identified \"bonifacio\" or \"halloween\" correctly 49/52 cards, 2/52 self corrected with min cues, and 1/52 given total assist,  and completed task in 2:52 minutes. *good sustained and selective attention noted with task   *slower processing speed       Long-Term Goals:  Timeframe for Long-term Goals: 2 weeks:  Goal 1: Pt will improve cognitive-linguistic skills to a supervision level of assist for safe, functional return to home setting with family support post discharge.  ONGOING     Goal 2: Pt problem solving, reasoning, and thought organization tasks. Patient is safely and efficiently consuming a regular texture diet and thin liquids with no reports of overt s/s of aspiration. Prior to hospitalization, patient lived independently at home with assistance managing finances, medications, and completing ADLs. Patient would greatly benefit from completion of further skilled ST services to address aforementioned deficits in order to safely discharge home and complete ADLs/IADLs with least amount of supervision/assistance. Activity Tolerance:  Patient tolerance of  treatment: good. Cooperative with ST and POC. Assessment/Plan: Patient progressing toward established goals. Continues to require skilled care of licensed speech pathologist to progress toward achievement of established goals and plan of care.   Plan for Next Session: Attention and thought organization       [facility]) [address] M[address]

## 2021-07-19 NOTE — PROGRESS NOTES
[address] [place] [address]  INPATIENT PHYSICAL THERAPY  DAILY NOTE  [facility] - [id number]    Time In: 1400  Time Out: 1445  Timed Code Treatment Minutes: 39 Minutes  Minutes: 45          Date: 2021  Patient Name: Rajani Talamantes,  Gender:  female        MRN: [id number]  : 1933  (80 y.o.)  Referral Date : 21  Referring Practitioner: Manny Lindsay MD  Diagnosis: Breast Cancer Metastisized to brain, right  Additional Pertinent Hx: Per H&P:Latanya Rod is a 80 y.o. female admitted to [address] [place] [address] on 2021. 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 ([address] Carotid stenosis, Colostomy in place [facility]), Degenerative arthritis of cervical spine, GERD (gastroesophageal reflux disease), Hypercholesteremia, Hypoestrogenism, Hypothyroidism, Lumbago, Lumbosacral spinal stenosis, MDRO (multiple drug resistant organisms) resistance, Personal history of cardiac dysrhythmia, Sigmoid diverticulosis, Spondylolisthesis of lumbosacral region, Steroid-induced hyperglycemia, Subclavian artery stenosis ([address] Superior and Inferior Pubic ramus fracture, right, closed, initial encounter ([address] SVT (supraventricular tachycardia) ([address] Temporal arteritis ([address] Vertebral artery occlusion, and Vitamin D deficiency. Patient presented to [facility] ER after suffering a fall at home. Patient was attempting to ambulate when her legs gave out and she hit her nightstand. No LOC. She was too weak to get up. [facility] arrived and found her on the floor and called EMS. Patient underwent CT head and found to have right side brain mass as well as small subarachnoid hemorrhage. She is s/p debulking of the brain mass per Dr. Ela Wright 2021; Biopsy was c/w metastatic disease from her breast cancer.      Prior Level of Function:  Lives With: Alone  Type of Home: House  Home Layout: One level  Home Access: Level entry  [address]: 4 wheeled walker, [address] [facility], Ne, [facility] chair, [address] bed   Bathroom Shower/Tub: Tub/Shower unit, Shower chair with back  Bathroom Toilet: Standard  Bathroom Equipment: Shower chair, 3-in-1 commode    Receives Help From: Personal care attendant (Monday-Friday 2 hours)  ADL Assistance: Needs assistance  Homemaking Assistance: Needs assistance  Homemaking Responsibilities: Yes  Ambulation Assistance: Independent  Transfer Assistance: Independent  Active : No  Additional Comments: 2 hours/day DIVINA has an aide that assists with ADLs/IADLs. nieces live nearby and able to help at times. Restrictions/Precautions:  Restrictions/Precautions: General Precautions, Fall Risk  Position Activity Restriction  Other position/activity restrictions: L neglect, R artificial eye     SUBJECTIVE: Pt resting in bedside chair requesting to use restroom before therapy session. PAIN: 0/10: denies pain    Vitals: Vitals not assessed per clinical judgement, see nursing flowsheet    OBJECTIVE:  Bed Mobility:  Not Tested    Transfers:  Sit to Stand: Stand By Assistance  Stand to [facility], Required cuing and tactile cuing so that she would not miss the toilet seat when sitting    Ambulation:  Contact Guard Assistance  Distance: 175 feet x2   Surface: Level Tile  Device:4 Wheeled Walker  Gait Deviations: Forward Flexed Posture, Decreased Step Length Bilaterally, Decreased Gait Speed, Decreased Heel Strike Bilaterally and Pt running into several objects throughout easy street due to poor vision    Balance:  Dynamic Standing Balance: Contact Guard Assistance, Pt standing without UE support ~15 minutes while playing [employer] game to challange balance and improved stability. Pt did have difficulty seeing the game but over all did well with no LOB. Increased time spent in restroom with pt managing clothing, changing depends, completing dc clean up, and washing hands at sink.  Pt demod steady balance throughout majority of tasks, did require frequent verbal and tactile cuing to maneuver around in small rest room. Exercise:  None due to increased time spent in restroom     Functional Outcome Measures: Not completed       ASSESSMENT:  Assessment: Patient progressing toward established goals. and Pt tolerated fairly well. Limited by poor vision and occasional decreased safety awareness. Will require 24 hour supervision at discharge. Activity Tolerance:  Patient tolerance of  treatment: good. Equipment Recommendations:Equipment Needed: Yes  Other: Pt may need a w/c. Has [address] [place] [address], lift chair and hospital bed  Discharge Recommendations:  Continue to assess pending progress    Plan: Times per week: 5x/wk 90min, 1x/wk 30min  Times per day: Daily  Current Treatment Recommendations: Strengthening, Transfer Training, Endurance Training, Neuromuscular Re-education, Patient/Caregiver Education & Training, Balance Training, Gait Training, Wheelchair Mobility Training, Home Exercise Program, Functional Mobility Training, Stair training, Safety Education & Training    Patient Education  Patient Education: Plan of Care, Transfers, Gait    Goals:  Patient goals : go home  Short term goals  Time Frame for Short term goals: 1 week  Short term goal 1: Patient will complete supine < > sit with modified independence to transfer in/out of bed safely. GOAL MET  Short term goal 2: Patient will complete sit < > stand with SBA and verbal cues to less than or equal to 50% of the time to stand to ambulate with decreased difficulty. Short term goal 3: Patient will ambulate 76' with a RW and CGA to progress towards ambulating household distances safely. GOAL MET, SEE LTG  Short term goal 4: Patient will propel wheelchair 100' with SBA to increase independence and mobility.   Long term goals  Time Frame for Long term goals : 4 weeks  Long term goal 1: Patient will complete sit < > stand and stand pivot transfers with modified independence to transfer surface to surface safely. Long term goal 2: Patient will ambulate 80' with a RW and supervision to navigate home with decreased difficulty. Long term goal 3: Patient will improve tinetti to greater than or equal to 19/28 to reduce her risk for falls. Long term goal 4: Patient will ascend/descend 1 step with a RW and CGA for community entry. Long term goal 5: Patient will propel wheelchair 100' with modified independence to navigate living envionment/community safely    Following session, patient left in safe position with all fall risk precautions in place.

## 2021-07-19 NOTE — PLAN OF CARE
Problem: DISCHARGE BARRIERS  Goal: Patient's continuum of care needs are met  7/19/2021 1230 by MIGEL Brown  Note: SW received returned phone call from patient's Luke angel. SW reviewed with Luke angel, patient's goal to be able to return home at discharge. Luke Angel, verbalizes support with patient's decision, however, will continued to be concerned for patient living alone as her health declines. Luke Angel, indicates ability to assist patient as much as possible, but she baby sits her grandson and cares for her mother (patient's sister) that is declining. SW discussed support at discharge to include home health services, both skilled and non-skilled, as well as this SW reaching out to cancer center SW regarding continued support needs. SW reviewed recommendation for family education prior to patient's return home. Luke Angel, currently plans for family education from 1:30pm to 3:00pm, with discharge to follow. SW to follow and maintain involvement in discharge planning.

## 2021-07-19 NOTE — PROGRESS NOTES
Training  Plan Comment: Pt demonstrates decline from baseline, requiring increased assistance due to decreased endurance and vision. Pt will benefit from skilled OT services to improve functional mobility and self care ADl routine. Patient Education  Patient Education: IADL's    Goals  Short term goals  Time Frame for Short term goals: 2 weeks  Short term goal 1: Pt will complete visual scanning to L side with no cues during ADLs to decrease fall risk during ADLs. Short term goal 2: Pt will complete LB ADL with S and adaptive techs to increase indep with self care. Short term goal 3: Pt will complete dynamic standing task with B hand release and S x 4 min to increase balance required for ADL completion. Short term goal 4: Pt will complete functional mobility to/from BR with AD and S to increase indep with self care. Long term goals  Time Frame for Long term goals : 4 weeks  Long term goal 1: Pt will complete light ADL MOD I to improve indep in preparing a cup of coffee. Long term goal 2: Pt will complete ADL routine including shower transfer with S and adaptive techs to increase indep with self care. Long term goal 3: Pt will complete dynamic standing task with B hand release and S x 10 min to increase balance required for ADL completion. Long term goal 4: Pt will complete functional mobility to/from BR with AD and MOD I to increase indep with self care. Long term goal 5: Pt will complete toileting routine including transfer with S to increase indep. Following session, patient left in safe position with all fall risk precautions in place.

## 2021-07-19 NOTE — TELEPHONE ENCOUNTER
Date of last visit:  Visit date not found  Date of next visit:  9/21/2021    Requested Prescriptions     Pending Prescriptions Disp Refills    traZODone (DESYREL) 50 MG tablet [Pharmacy Med Name: TRAZODONE 50 MG TABLET] 30 tablet 0     Sig: take 1 tablet by mouth at bedtime if needed for sleep

## 2021-07-19 NOTE — PROGRESS NOTES
Agata and Jadon called and they would like to know how and what Jadon is supposed to do for his remote monitor appointment that is coming up on 04/29. Please call Jadon back at [phone].   [facility]      Date:  7/19/2021            Patient Name: Barrett Tong           MRN: [id number]  Acct: [de-identified]          YOB: 1933 (80 y.o.)       Gender: female   Diagnosis: breast cancer metastasized to the brain, right  Physician: Referring Practitioner:  Denise Watkins MD    REASON FOR MISSED TREATMENT:  Speech Therapy was with pt at this time- RT student will check in with pt later if time allows    Darvin Goncalves, CTRS    7/19/2021

## 2021-07-20 ENCOUNTER — TELEPHONE (OUTPATIENT)
Dept: NEUROSURGERY | Age: 86
End: 2021-07-20

## 2021-07-20 LAB
ERYTHROCYTE [DISTWIDTH] IN BLOOD BY AUTOMATED COUNT: 14.6 % (ref 11.5–14.5)
ERYTHROCYTE [DISTWIDTH] IN BLOOD BY AUTOMATED COUNT: 52 FL (ref 35–45)
GLUCOSE BLD-MCNC: 118 MG/DL (ref 70–108)
GLUCOSE BLD-MCNC: 74 MG/DL (ref 70–108)
GLUCOSE BLD-MCNC: 78 MG/DL (ref 70–108)
HCT VFR BLD CALC: 37.2 % (ref 37–47)
HEMOGLOBIN: 11.1 GM/DL (ref 12–16)
MCH RBC QN AUTO: 29.3 PG (ref 26–33)
MCHC RBC AUTO-ENTMCNC: 29.8 GM/DL (ref 32.2–35.5)
MCV RBC AUTO: 98.2 FL (ref 81–99)
PLATELET # BLD: 105 THOU/MM3 (ref 130–400)
PMV BLD AUTO: 11.3 FL (ref 9.4–12.4)
RBC # BLD: 3.79 MILL/MM3 (ref 4.2–5.4)
WBC # BLD: 6.5 THOU/MM3 (ref 4.8–10.8)

## 2021-07-20 PROCEDURE — 97110 THERAPEUTIC EXERCISES: CPT

## 2021-07-20 PROCEDURE — 97530 THERAPEUTIC ACTIVITIES: CPT

## 2021-07-20 PROCEDURE — 82948 REAGENT STRIP/BLOOD GLUCOSE: CPT

## 2021-07-20 PROCEDURE — 97130 THER IVNTJ EA ADDL 15 MIN: CPT

## 2021-07-20 PROCEDURE — 99233 SBSQ HOSP IP/OBS HIGH 50: CPT | Performed by: PHYSICAL MEDICINE & REHABILITATION

## 2021-07-20 PROCEDURE — 36415 COLL VENOUS BLD VENIPUNCTURE: CPT

## 2021-07-20 PROCEDURE — 1180000000 HC REHAB R&B

## 2021-07-20 PROCEDURE — 6360000002 HC RX W HCPCS: Performed by: INTERNAL MEDICINE

## 2021-07-20 PROCEDURE — 6370000000 HC RX 637 (ALT 250 FOR IP): Performed by: NURSE PRACTITIONER

## 2021-07-20 PROCEDURE — 97116 GAIT TRAINING THERAPY: CPT

## 2021-07-20 PROCEDURE — 6370000000 HC RX 637 (ALT 250 FOR IP): Performed by: PHYSICAL MEDICINE & REHABILITATION

## 2021-07-20 PROCEDURE — 97535 SELF CARE MNGMENT TRAINING: CPT

## 2021-07-20 PROCEDURE — 85027 COMPLETE CBC AUTOMATED: CPT

## 2021-07-20 PROCEDURE — 97129 THER IVNTJ 1ST 15 MIN: CPT

## 2021-07-20 PROCEDURE — 6370000000 HC RX 637 (ALT 250 FOR IP): Performed by: EMERGENCY MEDICINE

## 2021-07-20 RX ORDER — LANOLIN ALCOHOL/MO/W.PET/CERES
3 CREAM (GRAM) TOPICAL NIGHTLY PRN
Qty: 30 TABLET | Refills: 3 | Status: SHIPPED | OUTPATIENT
Start: 2021-07-20

## 2021-07-20 RX ORDER — ALPRAZOLAM 0.25 MG/1
0.25 TABLET ORAL NIGHTLY PRN
Qty: 30 TABLET | Refills: 0 | Status: SHIPPED | OUTPATIENT
Start: 2021-07-20 | End: 2021-08-12 | Stop reason: SDUPTHER

## 2021-07-20 RX ORDER — HYDRALAZINE HYDROCHLORIDE 25 MG/1
25 TABLET, FILM COATED ORAL EVERY 8 HOURS SCHEDULED
Qty: 90 TABLET | Refills: 3 | Status: SHIPPED | OUTPATIENT
Start: 2021-07-20 | End: 2021-08-03 | Stop reason: ALTCHOICE

## 2021-07-20 RX ORDER — LIDOCAINE 4 G/G
2 PATCH TOPICAL DAILY
Qty: 3 BOX | Refills: 1 | Status: SHIPPED | OUTPATIENT
Start: 2021-07-21 | End: 2022-07-05 | Stop reason: ALTCHOICE

## 2021-07-20 RX ORDER — POLYETHYLENE GLYCOL 3350 17 G/17G
17 POWDER, FOR SOLUTION ORAL DAILY
Qty: 527 G | Refills: 1 | Status: SHIPPED | OUTPATIENT
Start: 2021-07-21 | End: 2021-08-20

## 2021-07-20 RX ORDER — DEXAMETHASONE 2 MG/1
2 TABLET ORAL DAILY
Qty: 10 TABLET | Refills: 0 | Status: SHIPPED | OUTPATIENT
Start: 2021-07-21 | End: 2022-08-01 | Stop reason: HOSPADM

## 2021-07-20 RX ORDER — PSEUDOEPHEDRINE HCL 30 MG
100 TABLET ORAL 2 TIMES DAILY
Qty: 60 CAPSULE | Refills: 1 | Status: SHIPPED | OUTPATIENT
Start: 2021-07-20 | End: 2022-03-22 | Stop reason: SDUPTHER

## 2021-07-20 RX ORDER — LEVETIRACETAM 500 MG/1
500 TABLET ORAL 2 TIMES DAILY
Qty: 60 TABLET | Refills: 3 | Status: SHIPPED | OUTPATIENT
Start: 2021-07-20 | End: 2021-11-16

## 2021-07-20 RX ADMIN — ALPRAZOLAM 0.25 MG: 0.25 TABLET ORAL at 08:28

## 2021-07-20 RX ADMIN — SERTRALINE 50 MG: 50 TABLET, FILM COATED ORAL at 08:29

## 2021-07-20 RX ADMIN — LEVETIRACETAM 500 MG: 500 TABLET, FILM COATED ORAL at 22:18

## 2021-07-20 RX ADMIN — LEVOTHYROXINE SODIUM 100 MCG: 0.1 TABLET ORAL at 05:52

## 2021-07-20 RX ADMIN — SERTRALINE 50 MG: 50 TABLET, FILM COATED ORAL at 22:18

## 2021-07-20 RX ADMIN — LEVETIRACETAM 500 MG: 500 TABLET, FILM COATED ORAL at 08:29

## 2021-07-20 RX ADMIN — DOCUSATE SODIUM 100 MG: 100 CAPSULE ORAL at 08:28

## 2021-07-20 RX ADMIN — HYDRALAZINE HYDROCHLORIDE 25 MG: 25 TABLET ORAL at 22:20

## 2021-07-20 RX ADMIN — HYDRALAZINE HYDROCHLORIDE 25 MG: 25 TABLET ORAL at 14:03

## 2021-07-20 RX ADMIN — Medication 3 MG: at 22:18

## 2021-07-20 RX ADMIN — NITROFURANTOIN MONOHYDRATE/MACROCRYSTALLINE 100 MG: 25; 75 CAPSULE ORAL at 22:39

## 2021-07-20 RX ADMIN — ACETAMINOPHEN 650 MG: 325 TABLET ORAL at 09:34

## 2021-07-20 RX ADMIN — NITROFURANTOIN MONOHYDRATE/MACROCRYSTALLINE 100 MG: 25; 75 CAPSULE ORAL at 08:29

## 2021-07-20 RX ADMIN — PANTOPRAZOLE SODIUM 40 MG: 40 TABLET, DELAYED RELEASE ORAL at 05:53

## 2021-07-20 RX ADMIN — ALPRAZOLAM 0.25 MG: 0.25 TABLET ORAL at 22:18

## 2021-07-20 RX ADMIN — ISOSORBIDE MONONITRATE 30 MG: 30 TABLET ORAL at 08:29

## 2021-07-20 RX ADMIN — DEXAMETHASONE 2 MG: 4 TABLET ORAL at 08:29

## 2021-07-20 RX ADMIN — HYDRALAZINE HYDROCHLORIDE 25 MG: 25 TABLET ORAL at 05:53

## 2021-07-20 RX ADMIN — ATORVASTATIN CALCIUM 10 MG: 10 TABLET, FILM COATED ORAL at 22:18

## 2021-07-20 ASSESSMENT — ENCOUNTER SYMPTOMS
COUGH: 0
ABDOMINAL DISTENTION: 0
BACK PAIN: 0
ANAL BLEEDING: 0
NAUSEA: 0
CHEST TIGHTNESS: 0

## 2021-07-20 ASSESSMENT — PAIN SCALES - GENERAL
PAINLEVEL_OUTOF10: 5
PAINLEVEL_OUTOF10: 0
PAINLEVEL_OUTOF10: 10
PAINLEVEL_OUTOF10: 0
PAINLEVEL_OUTOF10: 0

## 2021-07-20 NOTE — PROGRESS NOTES
[facility]  INPATIENT SPEECH THERAPY  [address] [facility]  DAILY NOTE    TIME   SLP Individual Minutes  Time In: 1430  Time Out: 1500  Minutes: 30  Timed Code Treatment Minutes: 30 Minutes        Date: 2021  Patient Name: Rupert Shore      CSN: [id number]   : 1933  (80 y.o.)  Gender: female   Referring Physician: Cmaelia Gonzales MD  Diagnosis: S/p craniotomy   Secondary Diagnosis: Dysphagia, cognitive linguistic deficits  Precautions: Fall risk, aspiration precautions   Current Diet: Regular, thin liquids   Swallowing Strategies: Full Upright Position, Small Bite/Sip and Alternate Solids and Liquids     Date of Last MBS/FEES: Not Applicable    Pain:   - Pain location: head; RN aware, cold compress placed    Subjective:  Patient seated upright in recliner for duration of session. Short-Term Goals:  SHORT TERM GOAL #1:  Goal 1: Pt will consume a regular diet and thin liquids without overt s/s of aspiration to meet nutritional/hydration measures safely. INTERVENTIONS: Not addressed due to focus on other goals. SHORT TERM GOAL #2:  Goal 2: Patient will complete functional immediate/delayed/prospective recall tasks (inclusion of compensatory strategies) with 80% accuracy, min cues to improve retention of pertinent information.    INTERVENTIONS: Recall discharge plans - 2/3 indep for when and where, 1/3 mod cues for services to be received    Generation of 10-item grocery list    Immediate recall - 7/10 indep, 2/10 min cues, 1/10 mod cues  Delayed 15 min recall - 7/10 indep, 3/10 min cues   *reviewed WRAP strategies; patient appears to benefit from written visual aids and reports enjoying generation of lists     SHORT TERM GOAL #3:  Goal 3: Pt will complete problem solving, verbal/visual reasoning, and executive functioning tasks (safety time, basic money/math comprehensive of preset up pill box) with 80% accuracy, min cues to improve contribution within ADLs/IADLs. INTERVENTIONS: Verbal sequencing task involving baking dessert of patients choice -- Patient with excellent initiation of task utilizing directional terminology with appropriate transitions. Demonstrated adequate sequential thought processing by identifying grocery items needed first, followed by step-by-step process to put ingredients together. Completed task overall with 80% accuracy independently (or 100% accuracy given min cues). Organizing grocery list by category/aisle  - 10/10 indep  *excellent visual organization and categorical thinking    SHORT TERM GOAL #4:  Goal 4: Pt will complete sequencing and thought organization tasks (i.e. divergent naming, multi step commands, complex yes/no questions) with 80% accuracy, min cues to improve mental flexibility and processing speed. INTERVENTIONS:     SHORT TERM GOAL #5:  Goal 5: Pt will complete functional attention tasks (i.e. sustained, selective, alternating) with no more than x3-4 errors/redirections within task completion for successful management of ADL's post discharge. INTERVENTIONS:     Long-Term Goals:  Timeframe for Long-term Goals: 2 weeks:  Goal 1: Pt will improve cognitive-linguistic skills to a supervision level of assist for safe, functional return to home setting with family support post discharge. Goal 2: Pt will safely consume regular diet+thin liquids demonstrating independent carryover of trained compensatory swallowing strategies and without overt s/s aspiration or pulmonary compromise to assist with nutrition/hydration measures.      Comprehension: 5 - Patient understands basic needs (hungry/hot/pain)  Expression: 5 - Expresses basic ideas/needs only (hungry/hot/pain)  Social Interaction: 5 - Patient is appropriate with supervision/cues  Problem Solvin - Patient solves simple/routine tasks 75-90%+   Memory: 4 - Patient remembers 75-90%+ of the time      EDUCATION:  Learner: Patient  Education:  Reviewed ST goals and Plan of Care, Reviewed recommendations for follow-up and Education Related to [employer] and Wellness  Evaluation of Education: [employer] understanding, Demonstrates with assistance, Needs further instruction and Family not present    ASSESSMENT/PLAN:    Activity Tolerance:  Patient tolerance of treatment: good. Cooperative with ST and POC. Assessment/Plan: Patient progressing toward established goals. Continues to require skilled care of licensed speech pathologist to progress toward achievement of established goals and plan of care.   Plan for Next Session: family education in prep for discharge home      Sowmya Lee. [id number] [facility] [id number]

## 2021-07-20 NOTE — PROGRESS NOTES
Progress Note  Date:7/19/2021      KC:[id number]  Patient Name:Latanya Rod     YOB: 1933     Age:88 y.o. Subjective    Subjective:  Symptoms:  Stable. She reports weakness. No cough or chest pain. Diet:  Adequate intake. No nausea. Activity level: Returning to normal.    Pain:  She complains of pain that is mild. She reports pain is unchanged. Pain is well controlled.        Current Facility-Administered Medications   Medication Dose Route Frequency Provider Last Rate Last Admin    dexamethasone (DECADRON) tablet 2 mg  2 mg Oral Daily Simone Gonzalez MD        muscle rub cream   Topical PRN PAMELA Michael CNP        nitrofurantoin (macrocrystal-monohydrate) (MACROBID) capsule 100 mg  100 mg Oral 2 times per day Zuhair Castro MD   100 mg at 07/19/21 2020    melatonin tablet 3 mg  3 mg Oral Nightly PRN PAMELA Michael CNP   3 mg at 07/19/21 2022    acetaminophen (TYLENOL) tablet 650 mg  650 mg Oral Q4H PRN Marly Chin MD   650 mg at 07/16/21 0954    magnesium hydroxide (MILK OF MAGNESIA) 400 MG/5ML suspension 30 mL  30 mL Oral Daily PRN Marly Chin MD        ondansetron (ZOFRAN-ODT) disintegrating tablet 4 mg  4 mg Oral Q8H PRN Zuhair Castro MD   4 mg at 07/10/21 2138    fleet rectal enema 1 enema  1 enema Rectal Daily PRN Zuhair Castro MD        lidocaine 4 % external patch 2 patch  2 patch Topical Daily Zuhair Castro MD   2 patch at 07/19/21 0806    polyethylene glycol (GLYCOLAX) packet 17 g  17 g Oral Daily Zuhair Castro MD   17 g at 07/17/21 0825    ALPRAZolam (XANAX) tablet 0.25 mg  0.25 mg Oral TID PRN Zuhair Castro MD   0.25 mg at 07/19/21 2343    atorvastatin (LIPITOR) tablet 10 mg  10 mg Oral Nightly Zuhair Castro MD   10 mg at 07/19/21 2020    dextrose 5 % solution  100 mL/hr Intravenous PRN Zuhair Castro MD        dextrose 50 % IV solution  12.5 g Intravenous PRN Zuhair Castro MD  docusate sodium (COLACE) capsule 100 mg  100 mg Oral BID Kenzie Garcia MD   100 mg at 21 08    glucagon (rDNA) injection 1 mg  1 mg Intramuscular PRN Kenzie Garcia MD        glucose (GLUTOSE) 40 % oral gel 15 g  15 g Oral PRN Kenzie Garcia MD        hydrALAZINE (APRESOLINE) tablet 25 mg  25 mg Oral 3 times per day Kenzie Garcia MD   25 mg at 21    insulin lispro (HUMALOG) injection vial 0-6 Units  0-6 Units Subcutaneous TID WC Kenzie Garcia MD   1 Units at 07/15/21 1822    insulin lispro (HUMALOG) injection vial 0-3 Units  0-3 Units Subcutaneous Nightly Kenzie Garcia MD   1 Units at 21    isosorbide mononitrate (IMDUR) extended release tablet 30 mg  30 mg Oral Daily Kenzie Garcia MD   30 mg at 21    levETIRAcetam (KEPPRA) tablet 500 mg  500 mg Oral BID Kenzie Garcia MD   500 mg at 21    levothyroxine (SYNTHROID) tablet 100 mcg  100 mcg Oral Daily Kenzie Garcia MD   100 mcg at 21 0514    pantoprazole (PROTONIX) tablet 40 mg  40 mg Oral QAM AC Kenzie Garcia MD   40 mg at 21    sertraline (ZOLOFT) tablet 50 mg  50 mg Oral BID Kenzie Garcia MD   50 mg at 21      Review of Systems   Constitutional: Negative for activity change. HENT: Negative for congestion. Respiratory: Negative for cough and chest tightness. Cardiovascular: Negative for chest pain, palpitations and leg swelling. Gastrointestinal: Negative for abdominal distention, anal bleeding and nausea. Genitourinary: Negative for difficulty urinating and frequency. Musculoskeletal: Negative for arthralgias and back pain. Neurological: Positive for weakness. Negative for dizziness, seizures, light-headedness and numbness. Psychiatric/Behavioral: Negative for behavioral problems.      Objective         Vitals Last 24 Hours:  TEMPERATURE:  Temp  Av.6 °F (36.4 °C)  Min: 97.6 °F (36.4 °C)  Max: 97.6 °F (36.4 °C)  RESPIRATIONS RANGE: Resp  Av.7  Min: 16  Max: 18  PULSE OXIMETRY RANGE: SpO2  Av %  Min: 93 %  Max: 96 %  PULSE RANGE: Pulse  Av.7  Min: 60  Max: 70  BLOOD PRESSURE RANGE: Systolic (98BMR), GUT:295 , Min:109 , GINI:554   ; Diastolic (08PIZ), JL, Min:52, Max:66    I/O (24Hr): Intake/Output Summary (Last 24 hours) at 2021 0012  Last data filed at 2021 1338  Gross per 24 hour   Intake 1080 ml   Output --   Net 1080 ml     Objective:  General Appearance:  Comfortable. Vital signs: (most recent): Blood pressure (!) 109/52, pulse 70, temperature 97.6 °F (36.4 °C), temperature source Oral, resp. rate 18, height 5' (1.524 m), weight 134 lb 3.2 oz (60.9 kg), SpO2 96 %, not currently breastfeeding. Vital signs are normal.    Output: Producing urine and producing stool. HEENT: Normal HEENT exam.    Lungs:  Normal effort and normal respiratory rate. Breath sounds clear to auscultation. Heart: Normal rate. Regular rhythm. S1 normal and S2 normal.  No murmur. Abdomen: Abdomen is soft and non-distended. Bowel sounds are normal.   There is no abdominal tenderness. There is no mass. Extremities: Normal range of motion. Neurological: Patient is alert and oriented to person, place and time. Patient has normal reflexes and normal muscle tone. Skin:  Warm and dry. No rash. Labs/Imaging/Diagnostics    Labs:  CBC:  Recent Labs     21  0658 21  0822 21  0645   WBC 9.2 10.4 8.8   RBC 3.76* 4.02* 3.73*   HGB 11.4* 12.0 11.1*   HCT 36.9* 40.5 37.0   MCV 98.1 100.7* 99.2*   * 136 120*     CHEMISTRIES:No results for input(s): NA, K, CL, CO2, BUN, CREATININE, GLUCOSE, PHOS, MG in the last 72 hours. Invalid input(s): CA  PT/INR:No results for input(s): PROTIME, INR in the last 72 hours. APTT:No results for input(s): APTT in the last 72 hours.   LIVER PROFILE:No results for input(s): AST, ALT, BILIDIR, BILITOT, ALKPHOS in the last 72 hours.    Imaging Last 24 Hours:  No results found. Assessment//Plan           Hospital Problems         Last Modified POA    Breast cancer metastasized to brain, right ([address] 7/8/2021 Yes    Status post craniotomy 7/9/2021 Yes    Impaired functional mobility, balance, gait, and endurance 7/9/2021 Yes    GERD (gastroesophageal reflux disease) 7/9/2021 Yes    Temporal arteritis ([address] 7/9/2021 Yes    Hypothyroidism due to acquired atrophy of thyroid 7/9/2021 Yes    Blind right eye 7/9/2021 Yes    Current chronic use of systemic steroids 7/9/2021 Yes    Acute cystitis without hematuria 7/15/2021 Yes    Coronary artery disease involving native coronary artery of native heart without angina pectoris 7/9/2021 Yes    Anxiety 7/9/2021 Yes    Fall 7/9/2021 Yes    Subarachnoid hemorrhage following injury, no loss of consciousness ([address] 7/9/2021 Yes    Fall at home, initial encounter 7/9/2021 Yes        Assessment:    Condition: In stable condition. Improving.   ( Breast  Cancer  With mets to brain as stable      ashd stable      gerd stable      thyroid on meds      uti  On meds      niddm stable      gerd stable      hypothyroid stable          ). Plan:   Per physical therapy. Consults: physical therapy and occupational therapy. Regular diet. Administer medications as ordered. (  Continue  With therapy     doing well and plan as home in next 2 days     Continue  Present meds      will need home therapies).        Electronically signed by Janey English MD on 7/20/21 at 12:12 AM EDT

## 2021-07-20 NOTE — PROGRESS NOTES
[address]  Occupational Therapy  Daily Note  Time:   Time In: 08  Time Out: 930  Timed Code Treatment Minutes: 60 Minutes  Minutes: 60          Date: 2021  Patient Name: Hero Elliott,   Gender: female      Room: [facility]/064-A  MRN: [id number]  : 1933  (80 y.o.)  Referring Practitioner: Arturo Leach MD  Diagnosis: breast cancer metastasized to the brain, right  Additional Pertinent Hx: Hero Elliott is a 80 y.o. female who presents to the emergency department for evaluation of fall. Patient has had multiple falls over the past couple weeks, refused EMS transfer each time. Today patient had a fall, striking her right temple. Patient is on Plavix. 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 Only pain that she is complaining of is to her right temple. Per MRI: Heterogeneous 5.5 cm nodular peripherally enhancing cystic and solid mass centered at the right occipital/temporal lobe junction with prominent adjacent T2/FLAIR prolongation involving the corpus callosum, temporal, parietal and frontal lobes with prominent mass effect on the right lateral ventricle and 9 mm leftward midline shift. (High grade glioma, glioblastoma multiform). Pt is S/P R craniotomy on 21. Pt admitted to inpatient rehab unit on 2021    Restrictions/Precautions:  Restrictions/Precautions: General Precautions, Fall Risk  Position Activity Restriction  Other position/activity restrictions: L neglect, R artificial eye     SUBJECTIVE: Up in chair upon arrival, agreeable to OT session    PAIN: 10/10: headache, nurse notified    Vitals: Vitals not assessed per clinical judgement, see nursing flowsheet    COGNITION: Slow Processing, Decreased Insight, Impaired Memory, Decreased Problem Solving, and Impulsive    ADL:     EATING:Independent. Able to open containers. CARE Score: 6.      ORAL HYGIENE:Supervision or touching assistance. Rinsed dentures standing sinkside. CARE Score: 4. TOILETING HYGIENE:Supervision or touching assistance. Supervision. CARE Score: 4. SHOWERING/BATHING:Supervision or touching assistance. Completed in shower, used shower seat. CARE Score: 4.     UPPER BODY DRESSING:Setup or clean-up assistance. donned sitting in bedside chair. CARE Score: 5. LOWER BODY DRESSING:Setup or clean-up assistance. donned sitting in bedside chair. CARE Score: 5. FOOTWEAR:Setup or clean-up assistance  donned sitting in bedside chair. CARE Score: 5.     TOILET TRANSFER: Supervision or touching assistance. STS, used grab bar. CARE Score: 4. BALANCE:  Standing Balance: Stand By Assistance. Approx 2 minutes standing sinkside    BED MOBILITY:  Not Tested    TRANSFERS:  Sit to Stand:  Stand By Assistance. Bedside chair  Stand to Sit: Stand By Assistance. FUNCTIONAL MOBILITY:  Assistive Device: 4 Wheeled Walker  Assist Level:  Stand By Assistance. Distance: To and from shower room  Difficulty pathfinding due to low vision     ASSESSMENT:     Activity Tolerance:  Patient tolerance of  treatment: good. Discharge Recommendations: Patient would benefit from continued therapy after discharge, Continue to assess pending progress, Home with Home health OT   Equipment Recommendations: Other: 4 wheeled walker  Plan: Times per week: 5x/wk for 90 min & 1x/wk for 30 min  Current Treatment Recommendations: Strengthening, Balance Training, Neuromuscular Re-education, Functional Mobility Training, Endurance Training, Home Management Training, Patient/Caregiver Education & Training, Self-Care / ADL, Safety Education & Training  Plan Comment: Pt demonstrates decline from baseline, requiring increased assistance due to decreased endurance and vision. Pt will benefit from skilled OT services to improve functional mobility and self care ADl routine.     Patient Education  Patient Education: ADL's    Goals  Short term goals  Time Frame for Short term goals: 2 weeks  Short term goal 1: Pt will complete visual scanning to L side with no cues during ADLs to decrease fall risk during ADLs. Short term goal 2: Pt will complete LB ADL with S and adaptive techs to increase indep with self care. Short term goal 3: Pt will complete dynamic standing task with B hand release and S x 4 min to increase balance required for ADL completion. Short term goal 4: Pt will complete functional mobility to/from BR with AD and S to increase indep with self care. Long term goals  Time Frame for Long term goals : 4 weeks  Long term goal 1: Pt will complete light ADL MOD I to improve indep in preparing a cup of coffee. Long term goal 2: Pt will complete ADL routine including shower transfer with S and adaptive techs to increase indep with self care. Long term goal 3: Pt will complete dynamic standing task with B hand release and S x 10 min to increase balance required for ADL completion. Long term goal 4: Pt will complete functional mobility to/from BR with AD and MOD I to increase indep with self care. Long term goal 5: Pt will complete toileting routine including transfer with S to increase indep. Following session, patient left in safe position with all fall risk precautions in place.

## 2021-07-20 NOTE — DISCHARGE SUMMARY
Physical Medicine & Rehabilitation   Discharge Summary     Patient Identification:  Cheyanne Adames  : 1933  Admit date: 2021  Discharge date: 21   Attending provider: Vikas Dorado MD        Primary care provider: Kenzie Garcia MD     Discharge Diagnoses:   Right brain mass - metastatic from breast  [facility] with facial bruising  Subarachnoid and parenchymal hemorrhage  Traumatic ecchymosis to left lower leg and left forearm, right forehead ecchymosis  Bilateral lower extremity weakness  Hypotension  Anxiety  GERD  SVT  HLD  Hyperglycemia, steroid induced  Hx Breast cancer with mets to the liver    Discharge Functional Status:    Physical therapy:    Bed Mobility:  Rolling to Right: Modified Independent   Supine to Sit: Modified Independent  Sit to Supine: Not tested   Scooting: Modified Independent  Used rail, HOB elevated     Transfers:  Sit to Stand: Modified Independent  Stand to Sit:Modified Independent, Minimal Assistance, fluctuates in perofrmance, at times pt MI, other times pt needing assist due to poor vision- pt nearly sat down missing the chair at end of session  Car:Modified Independent    Mobility:  , PT Equipment Recommendations  Equipment Needed: Yes  Other: W/C and 4 WW ordered from [facility]., Assessment: Patient is an 80year old female who presents with a decline in functional mobility compared to baseline. Patient completing transfers and ambulation with CGA/min assist, requiring assistance and cues due to weakness and impaired vision. Pt would benefit from skilled PT services to improve her ability to complete functional mobility, reduce her risk for falls and allow patient to return to PLOF. Occupational therapy:  ,  , Assessment: Pt is making good progress towards goals. Patient has met 4/4STG goals 0/5 LTG. Patient has progressed to SBA during BADL tasks and SBA with LB dressing.  Pt continues to require skilled Occupational Therapy to address the following performance deficits balance, endurance, safety awareness, and activity tolerance. Without skilled OT intervention pt is at risk for functional decline, increased falls, and readmission to hospital.    Speech therapy:  Comprehension: 4 - Patient understands basic needs 75-90%+ of the time  Expression: 4 - Expresses basic ideas/needs 75-90%+ of the time  Social Interaction: 5 - Patient is appropriate with supervision/cues  Problem Solving: 3 - Patient solves simple/routine tasks 50%-74%  Memory: 3 - Patient remembers 50%-74% of the time      Inpatient Rehabilitation Course:   Mikhail Hillman is a 80 y.o. female admitted to inpatient rehabilitation on 7/8/2021 for improved mobility, ADL's, cognition, endurance. The patient participated in an aggressive multidisciplinary inpatient rehabilitation program involving 3 hours per day, 5 days per week of rehabilitation. Diabetic management was maximized with diet and medication options to attempt to achieve blood sugar control between . Hypertension management was undertaken with medication management on any patient with systolic blood pressure greater than 953 or diastolic blood pressure greater than 90. Hyperlipidemia was considered and the patient was started or continued on a statin medication for any LDL>100. Appropriate stroke prophylaxis was maintained throughout rehabilitation stay. Appropriate DVT prophylaxis options were considered throughout rehabilitation stay. Dr Catherine Andersen followed during the IPR stay for medical management    Patient was discharged Home with [place] in Stable condition.     Consults:   Dietary    Significant Diagnostics:   CBC:   Lab Results   Component Value Date    WBC 6.5 07/20/2021    RBC 3.79 07/20/2021    RBC 4.23 04/08/2019    HGB 11.1 07/20/2021    HCT 37.2 07/20/2021    MCV 98.2 07/20/2021    MCH 29.3 07/20/2021    MCHC 29.8 07/20/2021    RDW 17.0 04/08/2019     07/20/2021    MPV 11.3 07/20/2021 BMP:    Lab Results   Component Value Date     07/09/2021    K 4.8 07/09/2021     07/09/2021    CO2 30 07/09/2021    BUN 28 07/09/2021    LABALBU 4.0 07/06/2021    CREATININE 0.5 07/09/2021    CALCIUM 9.3 07/09/2021    LABGLOM >90 07/09/2021    GLUCOSE 114 07/09/2021    GLUCOSE 103 07/23/2018        Recent Labs     07/19/21  1705 07/19/21 2029 07/20/21  0814   POCGLU 97 108 74       Cholesterol Panel:   No results found for requested labs within last 30 days. Patient Instructions: Follow-up visits: See after visit summary from hospitalization    Discharge Medications:   Janet Bedoya   Home Medication Instructions [id number]    Printed on:07/20/21 1425     Medication Information                        acetaminophen (TYLENOL) 500 MG tablet  Take 1,000 mg by mouth every 8 hours as needed for Pain              ALPRAZolam (XANAX) 0.25 MG tablet  Take 1 tablet by mouth nightly as needed for Anxiety for up to 30 days.              Calcium Carbonate-Vitamin D (OYSTER SHELL CALCIUM/D) 500-200 MG-UNIT TABS  take 1 tablet by mouth twice a day             dexamethasone (DECADRON) 2 MG tablet  Take 1 tablet by mouth daily for 10 days             docusate sodium (COLACE, DULCOLAX) 100 MG CAPS  Take 100 mg by mouth 2 times daily             hydrALAZINE (APRESOLINE) 25 MG tablet  Take 1 tablet by mouth every 8 hours             isosorbide mononitrate (IMDUR) 30 MG extended release tablet  take 1 tablet by mouth once daily             levETIRAcetam (KEPPRA) 500 MG tablet  Take 1 tablet by mouth 2 times daily             levothyroxine (SYNTHROID) 100 MCG tablet  take 1 tablet by mouth once daily             lidocaine 4 % external patch  Apply 2 patches topically daily             loratadine (CLARITIN) 10 MG tablet  take 1 tablet by mouth once daily             melatonin 3 MG TABS tablet  Take 1 tablet by mouth nightly as needed (insomnia)             Multiple Vitamins-Minerals (CENTRUM SILVER ADULT 50+) TABS  Take 1 tablet by mouth daily             Nutritional Supplements (ENSURE ORIGINAL) LIQD  Take 1 Can by mouth 2 times daily             pantoprazole (PROTONIX) 40 MG tablet  take 1 tablet by mouth once daily             polyethylene glycol (GLYCOLAX) 17 g packet  Take 17 g by mouth daily             promethazine (PHENERGAN) 25 MG tablet  Take 1 tablet by mouth every 6 hours as needed for Nausea (and vomiting)             RA ASPIRIN EC ADULT LOW ST 81 MG EC tablet  take 1 tablet by mouth once daily             RA BIOTIN 1000 MCG TABS  use as directed             sertraline (ZOLOFT) 50 MG tablet  take 1 tablet by mouth twice a day             simvastatin (ZOCOR) 20 MG tablet  take 1 tablet by mouth every evening             traZODone (DESYREL) 50 MG tablet  take 1 tablet by mouth at bedtime if needed for sleep             vitamin D (ERGOCALCIFEROL) 1.25 MG (35606 UT) CAPS capsule  take 1 capsule by mouth every week                 Controlled substances monitoring: possible medication side effects, risk of tolerance and/or dependence, and alternative treatments discussed and no signs of potential drug abuse or diversion identified.      45 minutes spent preparing the patient for discharge    Nas Warren MD

## 2021-07-20 NOTE — PROGRESS NOTES
[address]  Occupational Therapy  Daily Note  Time:   Time In: 1400  Time Out: 1430  Timed Code Treatment Minutes: 30 Minutes  Minutes: 30          Date: 2021  Patient Name: Scott Huang,   Gender: female      Room: [facility]64/064-A  MRN: [id number]  : 1933  (80 y.o.)  Referring Practitioner: Patel Dudley MD  Diagnosis: breast cancer metastasized to the brain, right  Additional Pertinent Hx: Scott Huang is a 80 y.o. female who presents to the emergency department for evaluation of fall. Patient has had multiple falls over the past couple weeks, refused EMS transfer each time. Today patient had a fall, striking her right temple. Patient is on Plavix. 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 Only pain that she is complaining of is to her right temple. Per MRI: Heterogeneous 5.5 cm nodular peripherally enhancing cystic and solid mass centered at the right occipital/temporal lobe junction with prominent adjacent T2/FLAIR prolongation involving the corpus callosum, temporal, parietal and frontal lobes with prominent mass effect on the right lateral ventricle and 9 mm leftward midline shift. (High grade glioma, glioblastoma multiform). Pt is S/P R craniotomy on 21. Pt admitted to inpatient rehab unit on 2021    Restrictions/Precautions:  Restrictions/Precautions: General Precautions, Fall Risk  Position Activity Restriction  Other position/activity restrictions: L neglect, R artificial eye     SUBJECTIVE: Up in chair upon arrival, agreeable to OT session, patient reported feeling very fatigued    PAIN: 8/10: B hands due to arthritis    Vitals: Vitals not assessed per clinical judgement, see nursing flowsheet    COGNITION: Slow Processing, Decreased Insight, Impaired Memory, Decreased Problem Solving and Decreased Safety Awareness    ADL:   Grooming: with set-up.   washed face with cold wash cloth. Applied lotion to B UE with set up    ADDITIONAL ACTIVITIES:  Patient completed BUE strengthening exercises with skilled education on HEP: completed x15 reps x1 set with a resistive band in all joints and all planes in order to improve UE strength and activity tolerance required for BADL routine and toilet / shower transfers. Patient tolerated well, requiring minimal rest breaks. ASSESSMENT:     Activity Tolerance:  Patient tolerance of  treatment: good. Discharge Recommendations: Patient would benefit from continued therapy after discharge, Continue to assess pending progress, Home with Home health OT   Equipment Recommendations: Other: 4 wheeled walker  Plan: Times per week: 5x/wk for 90 min & 1x/wk for 30 min  Current Treatment Recommendations: Strengthening, Balance Training, Neuromuscular Re-education, Functional Mobility Training, Endurance Training, Home Management Training, Patient/Caregiver Education & Training, Self-Care / ADL, Safety Education & Training  Plan Comment: Pt demonstrates decline from baseline, requiring increased assistance due to decreased endurance and vision. Pt will benefit from skilled OT services to improve functional mobility and self care ADl routine. Patient Education  Patient Education: Home Exercise Program    Goals  Short term goals  Time Frame for Short term goals: 2 weeks  Short term goal 1: Pt will complete visual scanning to L side with no cues during ADLs to decrease fall risk during ADLs. Short term goal 2: Pt will complete LB ADL with S and adaptive techs to increase indep with self care. Short term goal 3: Pt will complete dynamic standing task with B hand release and S x 4 min to increase balance required for ADL completion. Short term goal 4: Pt will complete functional mobility to/from BR with AD and S to increase indep with self care.   Long term goals  Time Frame for Long term goals : 4 weeks  Long term goal 1: Pt will complete light ADL MOD I to improve indep in preparing a cup of coffee. Long term goal 2: Pt will complete ADL routine including shower transfer with S and adaptive techs to increase indep with self care. Long term goal 3: Pt will complete dynamic standing task with B hand release and S x 10 min to increase balance required for ADL completion. Long term goal 4: Pt will complete functional mobility to/from BR with AD and MOD I to increase indep with self care. Long term goal 5: Pt will complete toileting routine including transfer with S to increase indep. Following session, patient left in safe position with all fall risk precautions in place.

## 2021-07-20 NOTE — CARE COORDINATION
Sridhar Gauthier was evaluated today and a DME order was entered for a 4 wheeled walker with a seat because she requires this to successfully complete daily living tasks of dressing lower body and transferring in/out of the wheelchair. A wheeled walker is necessary due to the patient's unsteady gait, upper body weakness, and inability to  an ambulation device; and she can ambulate only by pushing a walker instead of a lesser assistive device such as a cane, crutch, or standard walker. The need for this equipment was discussed with the patient and she understands and is in agreement.

## 2021-07-20 NOTE — PROGRESS NOTES
Physical Medicine & Rehabilitation   Progress Note    Chief Complaint:  Brain mass. Rehab needs    Subjective:  Patient seen in her room with ZIA Mueller LSW, following patient's inpatient rehab team conference. Discharge to home still scheduled for tomorrow with Home Health PT, OT, ST, , RN, and HHA. She is very pleased to be going home tomorrow and has no new questions today. She has been sleeping well at nights. Denied any pain today. Rehabilitation:  PT: Reviewed during team conference  OT: Reviewed during team conference   ST:  Reviewed during team conference  INTERVENTIONS:       Review of Systems:  CONSTITUTIONAL:  positive for  fatigue  EYES: blind right eye, cataracts left  HEENT:  negative  RESPIRATORY:  negative  CARDIOVASCULAR:  negative  GASTROINTESTINAL:  Negative  GENITOURINARY:  garcia  SKIN:  Bruises persist  HEMATOLOGIC/LYMPHATIC:  positive for swelling/edema  MUSCULOSKELETAL:  positive for  muscle weakness  NEUROLOGICAL:  positive for gait problems and weakness  BEHAVIOR/PSYCH:  negative  System review otherwise negative      Objective:  Vitals:    07/20/21 0824   BP: (!) 112/57   Pulse: 67   Resp: 16   Temp: 98 °F (36.7 °C)   SpO2: 94%   sleepy  Orientation:   person, place, time  Mood: within normal limits  Affect: calm  General appearance: Patient is well nourished, well developed, well groomed and in no acute distress     Memory:   normal,   Attention/Concentration: sleepy today  Language:  normal     Cranial Nerves:  cranial nerves II-XII are grossly intact except chronic vision issues with right eye false and left eye cataracts  ROM:  normal  Tone:  normal  Muscle bulk: within normal limits  Sensory:  Sensory intact     Skin: warm and dry, no rash or erythema. Right facial bruising improving.  Left hand bruising from recent blood draw  Peripheral vascular: Pulses: Normal upper and lower extremity pulses; Edema: trace    Diagnostics:   Recent Results (from

## 2021-07-20 NOTE — TELEPHONE ENCOUNTER
Dc srmc 7/20/21 charles dx craniotomy rs 29% dc home     Patient will need to be seen within one month. Please advise with an  appointment day and time .

## 2021-07-20 NOTE — PLAN OF CARE
Problem: Falls - Risk of:  Goal: Will remain free from falls  Description: Will remain free from falls  Outcome: Ongoing  Patient remains free of falls. Problem: Skin Integrity:  Goal: Absence of new skin breakdown  Description: Absence of new skin breakdown  Outcome: Ongoing   Patient has no new skin breakdown at this time.

## 2021-07-20 NOTE — PROGRESS NOTES
entry  [address]: 4 wheeled walker, [address] [facility], [facility], [address] [facility] chair, [address] bed   Bathroom Shower/Tub: Tub/Shower unit, Shower chair with back  Bathroom Toilet: Standard  Bathroom Equipment: Shower chair, 3-in-1 commode    Receives Help From: Personal care attendant (Monday-Friday 2 hours)  ADL Assistance: Needs assistance  Homemaking Assistance: Needs assistance  Homemaking Responsibilities: Yes  Ambulation Assistance: Independent  Transfer Assistance: Independent  Active : No  Additional Comments: 2 hours/day DIVINA has an aide that assists with ADLs/IADLs. nieces live nearby and able to help at times. Restrictions/Precautions:  Restrictions/Precautions: General Precautions, Fall Risk  Position Activity Restriction  Other position/activity restrictions: L neglect, R artificial eye     SUBJECTIVE: pt in bed upon arrival and agrees to therapy. Pleasant and cooperative. liited by low vision    PAIN: 8/10: headache. Notified RN of pts request for tylenol    Vitals: Vitals not assessed per clinical judgement, see nursing flowsheet    OBJECTIVE:  Bed Mobility:  Rolling to Right: Modified Independent   Supine to Sit: Modified Independent  Sit to Supine: Not tested   Scooting: Modified Independent  Used rail, HOB elevated    Transfers:  Sit to Stand: Modified Independent  Stand to Sit:Modified Independent, Minimal Assistance, fluctuates in perofrmance, at times pt MI, other times pt needing assist due to poor vision- pt nearly sat down missing the chair at end of session  Car:Modified Independent    Ambulation:  Stand By Assistance, with cues for safety, with verbal cues , with increased time for completion  Distance: 342ctp3, 15ftx1 65ftx1  Surface: Level Tile  Device:4 Wheeled Walker  Gait Deviations:   Forward Flexed Posture, Slow Gillian, Decreased Step Length Bilaterally, Decreased Gait Speed, Decreased Heel Strike Bilaterally, Unsteady Gait, Decreased Terminal Knee Extension and pt needing cues throughout for guidance duw to low vision, pt also needing cues for safety and proximity to AD/to stay within frame of AD    Balance:  Dynamic Standing Balance: Stand By Assistance  Pt completed functional tasks in bathroom with 4ww for support, pt steady did need assist for task completion    Stairs:  Stand By Assistance, with verbal cues   Number of Steps: 1  Height: 6\" step with 4 Wheeled Walker  Assisted needed to locate step and to safely step up/down due to low vision, pt also needing cues for brakes      Functional Outcome Measures: Completed       ASSESSMENT:  Assessment: Patient progressing toward established goals. Activity Tolerance:  Patient tolerance of  treatment: good. Pt limtied by vision     Equipment Recommendations:Equipment Needed: Yes  Other: Pt may need a w/c. Has 4 [employer], lift chair and hospital bed  Discharge Recommendations:  Continue to assess pending progress    Plan: Times per week: 5x/wk 90min, 1x/wk 30min  Times per day: Daily  Current Treatment Recommendations: Strengthening, Transfer Training, Endurance Training, Neuromuscular Re-education, Patient/Caregiver Education & Training, Balance Training, Gait Training, Wheelchair Mobility Training, Home Exercise Program, Functional Mobility Training, Stair training, Safety Education & Training    Patient Education  Patient Education: Plan of Care, [employer] Mobility, Transfers, Gait, Stairs, Car Transfers    Goals:  Patient goals : go home  Short term goals  Time Frame for Short term goals: 1 week  Short term goal 1: Patient will complete supine < > sit with modified independence to transfer in/out of bed safely. GOAL MET  Short term goal 2: Patient will complete sit < > stand with SBA and verbal cues to less than or equal to 50% of the time to stand to ambulate with decreased difficulty. Short term goal 3: Patient will ambulate 76' with a RW and CGA to progress towards ambulating household distances safely.  GOAL MET, SEE LTG  Short term goal 4: Patient will propel wheelchair 100' with SBA to increase independence and mobility. Long term goals  Time Frame for Long term goals : 4 weeks  Long term goal 1: Patient will complete sit < > stand and stand pivot transfers with modified independence to transfer surface to surface safely. Long term goal 2: Patient will ambulate 80' with a RW and supervision to navigate home with decreased difficulty. Long term goal 3: Patient will improve tinetti to greater than or equal to 19/28 to reduce her risk for falls. Long term goal 4: Patient will ascend/descend 1 step with a RW and CGA for community entry. Long term goal 5: Patient will propel wheelchair 100' with modified independence to navigate living envionment/community safely    Following session, patient left in safe position with all fall risk precautions in place.

## 2021-07-20 NOTE — PROGRESS NOTES
[facility]  INPATIENT PHYSICAL THERAPY  DAILY NOTE  [address] [facility] - 7E-[id number]    Time In: 1100  Time Out: 1200  Timed Code Treatment Minutes: 60 Minutes  Minutes: 60          Date: 2021  Patient Name: Jonatan Sandy,  Gender:  female        MRN: [id number]  : 1933  (80 y.o.)  Referral Date : 21  Referring Practitioner: Glenny Ho MD  Diagnosis: Breast Cancer Metastisized to brain, right  Additional Pertinent Hx: Per H&P:Latanya Rod is a 80 y.o. female admitted to [facility] on 2021. 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 ([address] Carotid stenosis, Colostomy in place [facility]), Degenerative arthritis of cervical spine, GERD (gastroesophageal reflux disease), Hypercholesteremia, Hypoestrogenism, Hypothyroidism, Lumbago, Lumbosacral spinal stenosis, MDRO (multiple drug resistant organisms) resistance, Personal history of cardiac dysrhythmia, Sigmoid diverticulosis, Spondylolisthesis of lumbosacral region, Steroid-induced hyperglycemia, Subclavian artery stenosis ([address] Superior and Inferior Pubic ramus fracture, right, closed, initial encounter ([address] SVT (supraventricular tachycardia) ([address] Temporal arteritis ([address] Vertebral artery occlusion, and Vitamin D deficiency. Patient presented to [facility] ER after suffering a fall at home. Patient was attempting to ambulate when her legs gave out and she hit her nightstand. No LOC. She was too weak to get up. [facility] arrived and found her on the floor and called EMS. Patient underwent CT head and found to have right side brain mass as well as small subarachnoid hemorrhage. She is s/p debulking of the brain mass per Dr. Alina Portillo 2021; Biopsy was c/w metastatic disease from her breast cancer.      Prior Level of Function:  Lives With: Alone  Type of Home: House  Home Layout: One level  Home Access: Level Surface: Level Tile  Device:4 Wheeled Walker  Gait Deviations: Forward Flexed Posture, Slow Gillian, Decreased Step Length Bilaterally, Decreased Gait Speed, Decreased Heel Strike Bilaterally, Narrow Base of Support, Mild Path Deviations, Unsteady Gait, Decreased Terminal Knee Extension and required guidance at walker d/t low vision, required verbal cues for staying in closer proximity to walker. Stairs:  Stairs:  6\" steps. X 4 using Bilateral Handrails and Stand By Assistance, with verbal cues , with increased time for completion. Stairs: 4\" steps. X12 using Bilateral Handrails and Stand By Assistance, with verbal cues, with increased time for completion. Platform:  6\" platform X 1 using 4 Wheeled Walker and Stand By Assistance, with verbal cues , with increased time for completion. Balance:  Dynamic Standing Balance: Stand By Assistance   *Completed functional bathroom tasks at SBA, verbal cues for safe placement of 4WW while in bathroom to avoid tripping d/t low vision. *Using adaptive reacher, patient picked up object from floor with 1 UE support on AD at Mod. I.  Verbal cues for technique but demonstrated safely. Functional Outcome Measures: Not completed       ASSESSMENT:  Assessment: Patient progressing toward established goals. Activity Tolerance:  Patient tolerance of  treatment: good. Equipment Recommendations:Equipment Needed: Yes  Other: W/C and 4 WW ordered from E.   Discharge Recommendations: Home Health PT, 24/7 supervision     Plan: Times per week: 5x/wk 90min, 1x/wk 30min  Times per day: Daily  Current Treatment Recommendations: Strengthening, Transfer Training, Endurance Training, Neuromuscular Re-education, Patient/Caregiver Education & Training, Balance Training, Gait Training, Wheelchair Mobility Training, Home Exercise Program, Functional Mobility Training, Stair training, Safety Education & Training    Patient Education  Patient Education: Plan of Care,

## 2021-07-21 VITALS
DIASTOLIC BLOOD PRESSURE: 60 MMHG | HEIGHT: 60 IN | SYSTOLIC BLOOD PRESSURE: 144 MMHG | WEIGHT: 133.2 LBS | OXYGEN SATURATION: 94 % | BODY MASS INDEX: 26.15 KG/M2 | RESPIRATION RATE: 12 BRPM | TEMPERATURE: 98.6 F | HEART RATE: 70 BPM

## 2021-07-21 LAB
ERYTHROCYTE [DISTWIDTH] IN BLOOD BY AUTOMATED COUNT: 14.6 % (ref 11.5–14.5)
ERYTHROCYTE [DISTWIDTH] IN BLOOD BY AUTOMATED COUNT: 53.1 FL (ref 35–45)
GLUCOSE BLD-MCNC: 109 MG/DL (ref 70–108)
GLUCOSE BLD-MCNC: 80 MG/DL (ref 70–108)
HCT VFR BLD CALC: 38.6 % (ref 37–47)
HEMOGLOBIN: 11.5 GM/DL (ref 12–16)
MCH RBC QN AUTO: 29.7 PG (ref 26–33)
MCHC RBC AUTO-ENTMCNC: 29.8 GM/DL (ref 32.2–35.5)
MCV RBC AUTO: 99.7 FL (ref 81–99)
PLATELET # BLD: 92 THOU/MM3 (ref 130–400)
PMV BLD AUTO: 11.5 FL (ref 9.4–12.4)
RBC # BLD: 3.87 MILL/MM3 (ref 4.2–5.4)
WBC # BLD: 6.3 THOU/MM3 (ref 4.8–10.8)

## 2021-07-21 PROCEDURE — 6370000000 HC RX 637 (ALT 250 FOR IP): Performed by: EMERGENCY MEDICINE

## 2021-07-21 PROCEDURE — 92526 ORAL FUNCTION THERAPY: CPT

## 2021-07-21 PROCEDURE — 6370000000 HC RX 637 (ALT 250 FOR IP): Performed by: PHYSICAL MEDICINE & REHABILITATION

## 2021-07-21 PROCEDURE — 97530 THERAPEUTIC ACTIVITIES: CPT

## 2021-07-21 PROCEDURE — 36415 COLL VENOUS BLD VENIPUNCTURE: CPT

## 2021-07-21 PROCEDURE — 97129 THER IVNTJ 1ST 15 MIN: CPT

## 2021-07-21 PROCEDURE — 85027 COMPLETE CBC AUTOMATED: CPT

## 2021-07-21 PROCEDURE — 97110 THERAPEUTIC EXERCISES: CPT

## 2021-07-21 PROCEDURE — 97542 WHEELCHAIR MNGMENT TRAINING: CPT

## 2021-07-21 PROCEDURE — 82948 REAGENT STRIP/BLOOD GLUCOSE: CPT

## 2021-07-21 PROCEDURE — 99239 HOSP IP/OBS DSCHRG MGMT >30: CPT | Performed by: PHYSICAL MEDICINE & REHABILITATION

## 2021-07-21 PROCEDURE — 6360000002 HC RX W HCPCS: Performed by: INTERNAL MEDICINE

## 2021-07-21 RX ADMIN — ACETAMINOPHEN 650 MG: 325 TABLET ORAL at 08:00

## 2021-07-21 RX ADMIN — SERTRALINE 50 MG: 50 TABLET, FILM COATED ORAL at 08:01

## 2021-07-21 RX ADMIN — LEVOTHYROXINE SODIUM 100 MCG: 0.1 TABLET ORAL at 06:28

## 2021-07-21 RX ADMIN — LEVETIRACETAM 500 MG: 500 TABLET, FILM COATED ORAL at 08:01

## 2021-07-21 RX ADMIN — PANTOPRAZOLE SODIUM 40 MG: 40 TABLET, DELAYED RELEASE ORAL at 06:28

## 2021-07-21 RX ADMIN — ISOSORBIDE MONONITRATE 30 MG: 30 TABLET ORAL at 08:01

## 2021-07-21 RX ADMIN — DEXAMETHASONE 2 MG: 4 TABLET ORAL at 08:01

## 2021-07-21 RX ADMIN — HYDRALAZINE HYDROCHLORIDE 25 MG: 25 TABLET ORAL at 14:21

## 2021-07-21 RX ADMIN — ALPRAZOLAM 0.25 MG: 0.25 TABLET ORAL at 08:00

## 2021-07-21 RX ADMIN — HYDRALAZINE HYDROCHLORIDE 25 MG: 25 TABLET ORAL at 06:28

## 2021-07-21 RX ADMIN — NITROFURANTOIN MONOHYDRATE/MACROCRYSTALLINE 100 MG: 25; 75 CAPSULE ORAL at 08:01

## 2021-07-21 ASSESSMENT — PAIN SCALES - GENERAL
PAINLEVEL_OUTOF10: 8
PAINLEVEL_OUTOF10: 0
PAINLEVEL_OUTOF10: 0

## 2021-07-21 ASSESSMENT — ENCOUNTER SYMPTOMS
CONSTIPATION: 0
DIARRHEA: 0
NAUSEA: 0
SHORTNESS OF BREATH: 0
VOMITING: 0
ABDOMINAL DISTENTION: 0
COLOR CHANGE: 0
SINUS PAIN: 0
COUGH: 0

## 2021-07-21 NOTE — PROGRESS NOTES
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 female who presents to the emergency department for evaluation of fall. Patient has had multiple falls over the past couple weeks, refused EMS transfer each time. Today patient had a fall, striking her right temple. Patient is on Plavix. 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 Only pain that she is complaining of is to her right temple. Per MRI: Heterogeneous 5.5 cm nodular peripherally enhancing cystic and solid mass centered at the right occipital/temporal lobe junction with prominent adjacent T2/FLAIR prolongation involving the corpus callosum, temporal, parietal and frontal lobes with prominent mass effect on the right lateral ventricle and 9 mm leftward midline shift. (High grade glioma, glioblastoma multiform). Pt is S/P R craniotomy on 21. Pt admitted to inpatient rehab unit on 2021    Restrictions/Precautions:  Restrictions/Precautions: General Precautions, Fall Risk  Position Activity Restriction  Other position/activity restrictions: L neglect, R artificial eye    SUBJECTIVE: Patient in chair upon arrival agreeable to OT treatment, niece present for education     PAIN: no     Vitals: Vitals not assessed per clinical judgement, see nursing flowsheet    COGNITION: Decreased Insight    ADL:   Grooming: Supervision. Toileting: Supervision. Toilet Transfer: Supervision. .  Education for safe transfers with family given     BALANCE:  Sitting Balance:  Modified Independent. complete ADL routine including shower transfer with S and adaptive techs to increase indep with self care. MET   Long term goal 3: Pt will complete dynamic standing task with B hand release and S x 10 min to increase balance required for ADL completion. MET   Long term goal 4: Pt will complete functional mobility to/from BR with AD and MOD I to increase indep with self care. NOT MET   Long term goal 5: Pt will complete toileting routine including transfer with S to increase indep. MET    Following session, patient left in safe position with all fall risk precautions in place.

## 2021-07-21 NOTE — PROGRESS NOTES
[facility]  INPATIENT PHYSICAL THERAPY  DISCHARGE NOTE  [facility] - [id number]    Time In: 1330  Time Out: 1400  Timed Code Treatment Minutes: 30 Minutes  Minutes: 30          Date: 2021  Patient Name: Acosta Lai,  Gender:  female        MRN: [id number]  : 1933  (80 y.o.)  Referral Date : 21  Referring Practitioner: Princess El MD  Diagnosis: Breast Cancer Metastisized to brain, right  Additional Pertinent Hx: Per H&P:Latanya Rod is a 80 y.o. female admitted to [facility] on 2021. 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 ([address] Carotid stenosis, Colostomy in place [facility]), Degenerative arthritis of cervical spine, GERD (gastroesophageal reflux disease), Hypercholesteremia, Hypoestrogenism, Hypothyroidism, Lumbago, Lumbosacral spinal stenosis, MDRO (multiple drug resistant organisms) resistance, Personal history of cardiac dysrhythmia, Sigmoid diverticulosis, Spondylolisthesis of lumbosacral region, Steroid-induced hyperglycemia, Subclavian artery stenosis ([address] Superior and Inferior Pubic ramus fracture, right, closed, initial encounter ([address] SVT (supraventricular tachycardia) ([address] Temporal arteritis ([address] Vertebral artery occlusion, and Vitamin D deficiency. Patient presented to [facility] ER after suffering a fall at home. Patient was attempting to ambulate when her legs gave out and she hit her nightstand. No LOC. She was too weak to get up. Her [place] aide arrived and found her on the floor and called EMS. Patient underwent CT head and found to have right side brain mass as well as small subarachnoid hemorrhage. She is s/p debulking of the brain mass per Dr. León Wolfe 2021; Biopsy was c/w metastatic disease from her breast cancer.      Prior Level of Function:  Lives With: Alone  Type of Home: House  Home Layout: One level  Home Access: Level entry  [address]: 4 wheeled walker, [place], [facility] chair, [address] bed   Bathroom Shower/Tub: Tub/Shower unit, Shower chair with back  Bathroom Toilet: Standard  Bathroom Equipment: Shower chair, 3-in-1 commode    Receives Help From: Personal care attendant (Monday-Friday 2 hours)  ADL Assistance: Needs assistance  Homemaking Assistance: Needs assistance  Homemaking Responsibilities: Yes  Ambulation Assistance: Independent  Transfer Assistance: Independent  Active : No  Additional Comments: 2 hours/day M-F has an aide that assists with ADLs/IADLs. nieces live nearby and able to help at times. Restrictions/Precautions:  Restrictions/Precautions: General Precautions, Fall Risk  Position Activity Restriction  Other position/activity restrictions: L neglect, R artificial eye     SUBJECTIVE: Pt in restroom, completing toileting. PT assisted with monitoring for washing hands. Niece in attendance for education. PAIN: 0/10:     Vitals: Vitals not assessed per clinical judgement, see nursing flowsheet    OBJECTIVE:  Bed Mobility:  Not Tested    Transfers:  Sit to Stand: Mod I, though occasional cue for locking brakes of walker. Stand to Sit:Stand By Assistance, on 1st trial, with pt reaching for w/c when not fully aligned. Demo'd correct technique to pt and niece. Good return demo noted by pt with cues for niece. Stand Pivot:Stand By Assistance, with 6AH  Car:Modified Independent for technique. SBA with w/c to car transfer. Reviewed and demo'd application of gait belt. Wheelchair Mobility:  Stand By Assistance  Extremities Used: Bilateral Upper Extremities  Type of Chair:Manual  Surface: Level Tile  Distance: 150 ft  Quality: Slow Velocity, Short Strokes and cue for steering at times due to decreased vision    W/c reviewed with niece for take down and reassemble for loading. Pt and niece educated and showed good return demo with locking brakes and swinging away legrests as needed. Ambulation:  Supervision  Distance: 125 ft, various shorter distances to destination in room  Surface: Level Tile  Device:4 Wheeled Walker  Gait Deviations:  Slow Gillian, Decreased Step Length Bilaterally, Decreased Gait Speed and tends to walk too far behind walker. Niece cueing pt for improved technique. Functional Outcome Measures: Not completed       ASSESSMENT:  Assessment: Patient progressing toward established goals, meeting all short term goals and 2 long term goals. Pt shows limitations with overall endurance and balance, complicated by decreased vision. Pt will benefit from skilled PT in the discharge setting to further advance in these areas. Improved independence may also occur due to pt being in her home environment and more aware of objects/pathways, not as reliant on vision. Activity Tolerance:  Patient tolerance of  treatment: good. Equipment Recommendations:Equipment Needed: Yes  Other: W/C and 4 WW ordered from [facility]. These were delivered from [address] [place] [address]. Therapist fitted the w/c and 4WW to the patient. Plan: Discharge to home with [facility] to f/u. Family to provide PRN support/assist.    Patient Education  Patient Education: Family Education, Transfers, Gait, Use of Gait Belt, Car Transfers, Wheelchair Mobility,  - Patient Verbalized Understanding, - Patient Requires Continued Education    Goals:  Patient goals : go home  Short term goals  Time Frame for Short term goals: 1 week  Short term goal 1: Patient will complete supine < > sit with modified independence to transfer in/out of bed safely. GOAL MET  Short term goal 2: Patient will complete sit < > stand with SBA and verbal cues to less than or equal to 50% of the time to stand to ambulate with decreased difficulty. MET  Short term goal 3: Patient will ambulate 76' with a RW and CGA to progress towards ambulating household distances safely.  GOAL MET, SEE LTG  Short term goal 4: Patient will propel wheelchair 100' with SBA to increase independence and mobility. MET  Long term goals  Time Frame for Long term goals : 4 weeks  Long term goal 1: Patient will complete sit < > stand and stand pivot transfers with modified independence to transfer surface to surface safely. NOT MET  Long term goal 2: Patient will ambulate 80' with a RW and supervision to navigate home with decreased difficulty. MET  Long term goal 3: Patient will improve tinetti to greater than or equal to 19/28 to reduce her risk for falls. NOT ADDRESSED  Long term goal 4: Patient will ascend/descend 1 step with a RW and CGA for community entry. MET  Long term goal 5: Patient will propel wheelchair 100' with modified independence to navigate living envionment/community safely NOT MET    Following session, patient left in safe position with all fall risk precautions in place.

## 2021-07-21 NOTE — PROGRESS NOTES
[facility]  Recreational Therapy  Discharge Note  Inpatient Rehabilitation Unit         Date:  7/21/2021       Patient Name: Samm Lopez      MRN: [id number]       YOB: 1933 (80 y.o.)       Gender: female  Diagnosis: breast cancer metastasized to the brain, right  Referring Practitioner: Vikki Huston MD    Patient discharged from Recreational Therapy at this time. See recreational therapy notes for details.     Electronically signed by: WALTER Dasilva  Date: 7/21/2021

## 2021-07-21 NOTE — PLAN OF CARE
Problem: Pain:  Goal: Control of acute pain  Description: Control of acute pain  7/21/2021 1124 by Bhavya Dennis LPN  Outcome: Ongoing  Patient able to verbalize when needing pain patches or medication, none needed today. Problem: Mobility - Impaired:  Goal: Mobility will improve  Description: Mobility will improve  7/21/2021 1124 by Bhavya Dennis LPN  Outcome: Ongoing  Patient able to ambulate with a standby/ 1 assist to toilet without difficulty.

## 2021-07-21 NOTE — PROGRESS NOTES
Progress Note  Date:7/20/2021       LVPD:[id number]  Patient Name:Latanya Rod     YOB: 1933     Age:88 y.o. Subjective    Subjective:  Symptoms:  Stable. She reports weakness. No shortness of breath, cough, chest pain or diarrhea. Diet:  Adequate intake. No nausea or vomiting. Activity level: Returning to normal.    Pain:  She reports no pain.        Current Facility-Administered Medications   Medication Dose Route Frequency Provider Last Rate Last Admin    dexamethasone (DECADRON) tablet 2 mg  2 mg Oral Daily Sophie Baird MD   2 mg at 07/20/21 6995    muscle rub cream   Topical PRN Annamaria L Walter APRN - CNP        nitrofurantoin (macrocrystal-monohydrate) (MACROBID) capsule 100 mg  100 mg Oral 2 times per day Ladonna Muro MD   100 mg at 07/20/21 2239    melatonin tablet 3 mg  3 mg Oral Nightly PRN PAMELA Lay - CNP   3 mg at 07/20/21 2218    acetaminophen (TYLENOL) tablet 650 mg  650 mg Oral Q4H PRN Afshin Briceño MD   650 mg at 07/20/21 0934    magnesium hydroxide (MILK OF MAGNESIA) 400 MG/5ML suspension 30 mL  30 mL Oral Daily PRN Afshin Briceño MD        ondansetron (ZOFRAN-ODT) disintegrating tablet 4 mg  4 mg Oral Q8H PRN Ladonna Muro MD   4 mg at 07/10/21 2138    fleet rectal enema 1 enema  1 enema Rectal Daily PRN Ladonna Muro MD        lidocaine 4 % external patch 2 patch  2 patch Topical Daily Ladonna Muro MD   2 patch at 07/20/21 0934    polyethylene glycol (GLYCOLAX) packet 17 g  17 g Oral Daily Ladonna Muro MD   17 g at 07/17/21 0825    ALPRAZolam Aneesh Ket) tablet 0.25 mg  0.25 mg Oral TID PRN Ladonna Muro MD   0.25 mg at 07/20/21 2218    atorvastatin (LIPITOR) tablet 10 mg  10 mg Oral Nightly Ladonna Muro MD   10 mg at 07/20/21 2218    dextrose 5 % solution  100 mL/hr Intravenous PRN Ladonna Muro MD        dextrose 50 % IV solution  12.5 g Intravenous PRN MD Juarez Gorman Objective         Vitals Last 24 Hours:  TEMPERATURE:  Temp  Av.9 °F (36.6 °C)  Min: 97.6 °F (36.4 °C)  Max: 98.1 °F (36.7 °C)  RESPIRATIONS RANGE: Resp  Avg: 15.7  Min: 15  Max: 16  PULSE OXIMETRY RANGE: SpO2  Av.7 %  Min: 94 %  Max: 97 %  PULSE RANGE: Pulse  Av  Min: 67  Max: 73  BLOOD PRESSURE RANGE: Systolic (96WHZ), XLP:222 , Min:110 , VGZ:039   ; Diastolic (25AWT), ZCC:13, Min:44, Max:57    I/O (24Hr): Intake/Output Summary (Last 24 hours) at 2021 0506  Last data filed at 2021 2215  Gross per 24 hour   Intake 1720 ml   Output --   Net 1720 ml     Objective:  General Appearance:  Comfortable. Vital signs: (most recent): Blood pressure (!) 110/44, pulse 67, temperature 97.6 °F (36.4 °C), temperature source Oral, resp. rate 16, height 5' (1.524 m), weight 133 lb 3.2 oz (60.4 kg), SpO2 97 %, not currently breastfeeding. Vital signs are normal.    Output: Producing urine and producing stool. HEENT: Normal HEENT exam.    Lungs:  Normal effort and normal respiratory rate. Breath sounds clear to auscultation. She is not in respiratory distress. Heart: Normal rate. Regular rhythm. S1 normal and S2 normal.  No murmur. Abdomen: Abdomen is soft. Bowel sounds are normal.   There is no abdominal tenderness. Extremities: Normal range of motion. Pulses: Distal pulses are intact. Pupils:  Pupils are equal, round, and reactive to light. Skin:  Warm and dry. Labs/Imaging/Diagnostics    Labs:  CBC:  Recent Labs     21  0822 21  0645 21  0701   WBC 10.4 8.8 6.5   RBC 4.02* 3.73* 3.79*   HGB 12.0 11.1* 11.1*   HCT 40.5 37.0 37.2   .7* 99.2* 98.2    120* 105*     CHEMISTRIES:No results for input(s): NA, K, CL, CO2, BUN, CREATININE, GLUCOSE, PHOS, MG in the last 72 hours. Invalid input(s): CA  PT/INR:No results for input(s): PROTIME, INR in the last 72 hours. APTT:No results for input(s): APTT in the last 72 hours.   LIVER PROFILE:No

## 2021-07-21 NOTE — PROGRESS NOTES
[address] [facility] THERAPY  [address] [facility]  DAILY NOTE    TIME   SLP Individual Minutes  Time In: 1430  Time Out: 4262  Minutes: 23  Timed Code Treatment Minutes: 15 Minutes   Cognitive therapy: 15 minutes  Dysphagia therapy: 8 minutes        Date: 2021  Patient Name: Eamon Hobbs      CSN: [id number]   : 1933  (80 y.o.)  Gender: female   Referring Physician: Sridevi Hu MD  Diagnosis: S/p craniotomy   Secondary Diagnosis: Dysphagia, cognitive linguistic deficits  Precautions: Fall risk, aspiration precautions   Current Diet: Regular, thin liquids   Swallowing Strategies: Full Upright Position, Small Bite/Sip and Alternate Solids and Liquids     Date of Last MBS/FEES: Not Applicable    Pain:  No pain reported. Subjective:  Patient seen upright in wheelchair with niece present for completion of family education. Plans for discharge this date. Recommendations for continuation of HHST upon discharge for continued targeting of cognitive deficits outlined below. Short-Term Goals:  SHORT TERM GOAL #1:  Goal 1: Pt will consume a regular diet and thin liquids without overt s/s of aspiration to meet nutritional/hydration measures safely. GOAL MET   INTERVENTIONS: Skilled level education re: course of rehabilitation with pt requiring direct 1:1 feeding assistance on acute care and progression towards self feeding at this time. Enhanced tolerance of advanced textures with upgrade to regular diet and thin liquids. Reviewed overt s/s of aspiration. Completed review and education of the following safe swallowing strategies/precautions--  *Sit upright (with feet down)  *Small bites/drinks  *Alternate solids/liquids    **recommendations to resume regular diet and thin liquids. All verbalized understanding.      SHORT TERM GOAL #2:  Goal 2: Patient will complete functional immediate/delayed/prospective recall tasks (inclusion of compensatory strategies) with 80% accuracy, min cues to improve retention of pertinent information. GOAL MET   INTERVENTIONS: Skilled level education re: skilled memory strategies (I.e. WRAP) provided. WRAP--Write it down, Repeat it, Associate it, and Pictures it with explanation, demonstration and rational provided. Stressed importance for patient utilization of verbal repetition, association and visual imagery given poor ability to complete written expression tasks given visual deficits present. All verbalized understanding. SHORT TERM GOAL #3:  Goal 3: Pt will complete problem solving, verbal/visual reasoning, and executive functioning tasks (safety time, basic money/math comprehensive of preset up pill box) with 80% accuracy, min cues to improve contribution within ADLs/IADLs. GOAL NOT MET   INTERVENTIONS: Skilled level education re: excellent pt verbal problem solving/reasoning; however, continue deficits within visual presentation given deficits presenting (I.e. medications, scheduling, grocery lists, etc). Patient will continue to require complete assistance with management of daily medications within pill, written expression of grocery lists and calendar organization. Stressed importance of assistance provided with continued opportunities for patient to enhance and rehabilitate skills in order to build independence. Will require continued intervention focusing on utilization of various sensory modalities (I.e. auditory, tactile) to enhance successful completion of above tasks. SHORT TERM GOAL #4:  Goal 4: Pt will complete sequencing and thought organization tasks (i.e. divergent naming, multi step commands, complex yes/no questions) with 80% accuracy, min cues to improve mental flexibility and processing speed. GOAL NOT MET   INTERVENTIONS: HEP tasks provided to enhance skills (I.e. card sorting, word searches, cross word puzzles, verbal highlighting of oral reading provided by family, etc).  Recommendations for large bold print to enhance visual acuity. SHORT TERM GOAL #5:  Goal 5: Pt will complete functional attention tasks (i.e. sustained, selective, alternating) with no more than x3-4 errors/redirections within task completion for successful management of ADL's post discharge. GOAL MET   INTERVENTIONS: Decreased visual acuity impacting above tasks. See above for further details. Long-Term Goals:  Timeframe for Long-term Goals: 2 weeks:  Goal 1: Pt will improve cognitive-linguistic skills to a supervision level of assist for safe, functional return to home setting with family support post discharge. GOAL NOT MET     Goal 2: Pt will safely consume regular diet+thin liquids demonstrating independent carryover of trained compensatory swallowing strategies and without overt s/s aspiration or pulmonary compromise to assist with nutrition/hydration measures.  GOAL MET     Comprehension: 5 - Patient understands basic needs (hungry/hot/pain)  Expression: 5 - Expresses basic ideas/needs only (hungry/hot/pain)  Social Interaction: 5 - Patient is appropriate with supervision/cues  Problem Solvin - Patient solves simple/routine tasks 75-90%+   Memory: 4 - Patient remembers 75-90%+ of the time    ST FIM ASSESSMENT:     Admission score Current score Goal Status   COMMUNICATION 3 - Patient understands basic needs 50-74% of the time   5 - Patient understands basic needs (hungry/hot/pain)   Progressing   EXPRESSION 4 - Expresses basic ideas/needs 75-90%+ of the time     5 - Expresses basic ideas/needs only (hungry/hot/pain)   Progressing   SOCIAL INTERACTION 4 - Patient appropriate 75-90%+ of the time   5 - Patient is appropriate with supervision/cues   Progressing   PROBLEM SOLVING 2 - Patient solves simple/routine tasks 25%-49%   4 - Patient solves simple/routine tasks 75-90%+    Progressing   MEMORY 3 - Patient remembers 50%-74% of the time   4 - Patient remembers 75-90%+ of the time   Progressing     SUMMARY: Pt has met above 3/5 STG's and 1/2 LTG's prior to discharge. Patient has demonstrated improvements in the areas of memory (with focus on compensatory strategies) attention (alternating/divided), verbal sequencing, thought organization and verbal problem solving/reasoning. Patient continues to demonstrate difficulty with mental manipulation, selective/visual attention, moderately complex executive functioning and visual problem solving/reasoning tasks. High concerns r/t return management of IADL's (I.e. medications, daily schedule, grocery list, etc) with additional supports/assistance recommended. Patient is safely and efficiently consuming a regular texture diet and thin liquids with no reports of overt s/s of aspiration. Prior to hospitalization, patient lived independently at home with assistance managing finances, medications, and completing ADLs. Plan for discharge back to home setting. Patient would greatly benefit from completion of further skilled ST services via [facility] to address aforementioned deficits in order to safely discharge home and complete ADLs/IADLs with least amount of supervision/assistance. EDUCATION:  Learner: Patient and niece  Education:  Reviewed ST goals and Plan of Care, Reviewed recommendations for follow-up and Education Related to [employer] and Wellness  Evaluation of Education: [employer] understanding, Demonstrates with assistance, Needs further instruction and Family not present    ASSESSMENT/PLAN:    Activity Tolerance:  Patient tolerance of treatment: good. Cooperative with ST and POC. Assessment/Plan: Patient discharged from Speech Therapy at this time due to planned discharge from [facility]. Discharge Disposition: Home with support.   Continued Speech Therapy Services recommended: Yes        Buck Harris M.S. Krishna Singh 7/21/2021

## 2021-07-22 ENCOUNTER — CARE COORDINATION (OUTPATIENT)
Dept: CASE MANAGEMENT | Age: 86
End: 2021-07-22

## 2021-07-22 ASSESSMENT — ENCOUNTER SYMPTOMS
CHEST TIGHTNESS: 0
CONSTIPATION: 0
ABDOMINAL DISTENTION: 0
VOMITING: 0
NAUSEA: 0
SHORTNESS OF BREATH: 0
COUGH: 0
DIARRHEA: 0

## 2021-07-22 NOTE — CARE COORDINATION
provider: No  none             Method of communication with provider : none      Advance Care Planning:   Does patient have an Advance Directive: reviewed and current. Was this a readmission? No  Patient stated reason for admission: therapy  Patients top risk factors for readmission: falls, lack of knowledge about disease, medical condition-CAD. breast CA with mets and polypharmacy    Care Transition Nurse (CTN) contacted the family by telephone to perform post hospital discharge assessment. Verified name and  with family as identifiers. Provided introduction to self, and explanation of the CTN role. CTN reviewed discharge instructions, medical action plan and red flags with family who verbalized understanding. Family given an opportunity to ask questions and does not have any further questions or concerns at this time. Were discharge instructions available to patient? Yes. Reviewed appropriate site of care based on symptoms and resources available to patient including: PCP, Specialist, Home health and When to call 911. The family agrees to contact the PCP office for questions related to their healthcare. CTN provided contact information. Plan for follow-up call in 1-2 days based on severity of symptoms and risk factors.   Plan for next call: symptom management-pain, appetite and medication management-review meds      Follow Up  Future Appointments   Date Time Provider Irene Shah   2021  2:10 PM Lavell Turner MD [facility]   2021 10:00 AM STR MRI RM1 STRZ MRI STR Radiolog   2021 12:30 PM Rubia Hernandez, Rhoda Apley [facility] [email or web address]   2021  1:40 PM Lavell Turner MD [address] [place] [address]

## 2021-07-22 NOTE — PROGRESS NOTES
Progress Note  Date:7/21/2021      IVWV:[id number]  Patient Name:Latanya Rod     YOB: 1933     Age:88 y.o. Subjective    Subjective:  Symptoms:  Improved. No shortness of breath, cough, chest pain or diarrhea. Diet:  Adequate intake. No nausea or vomiting. Activity level: Returning to normal.    Pain:  Pain is well controlled. No current facility-administered medications for this encounter. Current Outpatient Medications   Medication Sig Dispense Refill    ALPRAZolam (XANAX) 0.25 MG tablet Take 1 tablet by mouth nightly as needed for Anxiety for up to 30 days.  30 tablet 0    levETIRAcetam (KEPPRA) 500 MG tablet Take 1 tablet by mouth 2 times daily 60 tablet 3    hydrALAZINE (APRESOLINE) 25 MG tablet Take 1 tablet by mouth every 8 hours 90 tablet 3    dexamethasone (DECADRON) 2 MG tablet Take 1 tablet by mouth daily for 10 days 10 tablet 0    lidocaine 4 % external patch Apply 2 patches topically daily 3 box 1    docusate sodium (COLACE, DULCOLAX) 100 MG CAPS Take 100 mg by mouth 2 times daily 60 capsule 1    polyethylene glycol (GLYCOLAX) 17 g packet Take 17 g by mouth daily 527 g 1    melatonin 3 MG TABS tablet Take 1 tablet by mouth nightly as needed (insomnia) 30 tablet 3    simvastatin (ZOCOR) 20 MG tablet take 1 tablet by mouth every evening 90 tablet 1    RA BIOTIN 1000 MCG TABS Take 1,000 mcg by mouth daily       loratadine (CLARITIN) 10 MG tablet take 1 tablet by mouth once daily 90 tablet 1    Nutritional Supplements (ENSURE ORIGINAL) LIQD Take 1 Can by mouth 2 times daily 60 Bottle 5    pantoprazole (PROTONIX) 40 MG tablet take 1 tablet by mouth once daily 90 tablet 1    sertraline (ZOLOFT) 50 MG tablet take 1 tablet by mouth twice a day 180 tablet 1    isosorbide mononitrate (IMDUR) 30 MG extended release tablet take 1 tablet by mouth once daily 90 tablet 1    Calcium Carbonate-Vitamin D (OYSTER SHELL CALCIUM/D) 500-200 MG-UNIT TABS take 1 tablet by mouth twice a day 180 tablet 3    levothyroxine (SYNTHROID) 100 MCG tablet take 1 tablet by mouth once daily 90 tablet 1    RA ASPIRIN EC ADULT LOW ST 81 MG EC tablet take 1 tablet by mouth once daily      vitamin D (ERGOCALCIFEROL) 1.25 MG (20856 UT) CAPS capsule take 1 capsule by mouth every week      promethazine (PHENERGAN) 25 MG tablet Take 1 tablet by mouth every 6 hours as needed for Nausea (and vomiting) 12 tablet 0    acetaminophen (TYLENOL) 500 MG tablet Take 1,000 mg by mouth every 8 hours as needed for Pain       Multiple Vitamins-Minerals (CENTRUM SILVER ADULT 50+) TABS Take 1 tablet by mouth daily 90 tablet 1    traZODone (DESYREL) 50 MG tablet take 1 tablet by mouth at bedtime if needed for sleep 30 tablet 0      Review of Systems   Constitutional: Positive for activity change. HENT: Negative for congestion. Respiratory: Negative for cough, chest tightness and shortness of breath. Cardiovascular: Negative for chest pain, palpitations and leg swelling. Gastrointestinal: Negative for abdominal distention, constipation, diarrhea, nausea and vomiting. Genitourinary: Negative for difficulty urinating and dysuria. Musculoskeletal: Negative for arthralgias. Objective         Vitals Last 24 Hours:  TEMPERATURE:  Temp  Av.6 °F (37 °C)  Min: 98.6 °F (37 °C)  Max: 98.6 °F (37 °C)  RESPIRATIONS RANGE: Resp  Av  Min: 12  Max: 12  PULSE OXIMETRY RANGE: SpO2  Av %  Min: 94 %  Max: 94 %  PULSE RANGE: Pulse  Av.5  Min: 67  Max: 70  BLOOD PRESSURE RANGE: Systolic (03MGU), NO , Min:124 , ATW:092   ; Diastolic (66ARI), CNQ:70, Min:54, Max:60    I/O (24Hr): Intake/Output Summary (Last 24 hours) at 2021 7907  Last data filed at 2021 1039  Gross per 24 hour   Intake 360 ml   Output --   Net 360 ml     Objective:  General Appearance:  Comfortable.     Vital signs: (most recent): Blood pressure (!) 144/60, pulse 70, temperature 98.6 °F (37 °C), temperature source Oral, resp. rate 12, height 5' (1.524 m), weight 133 lb 3.2 oz (60.4 kg), SpO2 94 %, not currently breastfeeding. Vital signs are normal.    Output: Producing urine and producing stool. HEENT: Normal HEENT exam.    Lungs:  Normal effort and normal respiratory rate. Breath sounds clear to auscultation. She is not in respiratory distress. Heart: Normal rate. Regular rhythm. S1 normal and S2 normal.  No murmur (1/6 deyanira). Abdomen: Abdomen is soft and non-distended. Bowel sounds are normal.   There is no abdominal tenderness. Extremities: Normal range of motion. Neurological: Patient is alert and oriented to person, place and time. Labs/Imaging/Diagnostics    Labs:  CBC:  Recent Labs     07/19/21  0645 07/20/21  0701 07/21/21  0604   WBC 8.8 6.5 6.3   RBC 3.73* 3.79* 3.87*   HGB 11.1* 11.1* 11.5*   HCT 37.0 37.2 38.6   MCV 99.2* 98.2 99.7*   * 105* 92*     CHEMISTRIES:No results for input(s): NA, K, CL, CO2, BUN, CREATININE, GLUCOSE, PHOS, MG in the last 72 hours. Invalid input(s): CA  PT/INR:No results for input(s): PROTIME, INR in the last 72 hours. APTT:No results for input(s): APTT in the last 72 hours. LIVER PROFILE:No results for input(s): AST, ALT, BILIDIR, BILITOT, ALKPHOS in the last 72 hours. Imaging Last 24 Hours:  No results found.   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, right ([address] 7/20/2021 Yes    Status post craniotomy 7/9/2021 Yes    Impaired functional mobility, balance, gait, and endurance 7/9/2021 Yes    GERD (gastroesophageal reflux disease) 7/9/2021 Yes    Temporal arteritis ([address] 7/9/2021 Yes    Hypothyroidism due to acquired atrophy of thyroid 7/9/2021 Yes    Blind right eye 7/9/2021 Yes    Current chronic use of systemic steroids 7/9/2021 Yes    Acute cystitis without hematuria 7/15/2021 Yes    Coronary artery disease involving native coronary artery of native heart without angina pectoris 7/9/2021 Yes    Anxiety 7/9/2021 Yes    Fall 7/9/2021 Yes    Subarachnoid hemorrhage following injury, no loss of consciousness ([facility] 7/9/2021 Yes    Fall at home, initial encounter 7/9/2021 Yes        Assessment:    Condition: In stable condition. Improving.   ( Post brain met resection and with breast  Cancer mets      htn stable      gait impairment  Much better       gerd stable      ashd stable          ). Plan:   Discharge home. Per physical therapy. Consults: physical therapy and occupational therapy. Administer medications as ordered. (  Much improved      home today     see dr rousseau or dr Roni Madrigal  In one week ).        Electronically signed by Charmayne Reil, MD on 7/22/21 at 6:21 AM EDT

## 2021-07-23 ENCOUNTER — CARE COORDINATION (OUTPATIENT)
Dept: CASE MANAGEMENT | Age: 86
End: 2021-07-23

## 2021-07-23 NOTE — CARE COORDINATION
Care Transitions Outreach Attempt    Call within 2 business days of discharge: Yes   Attempted initial 24 hour transitional call to patient. Left VM to return call directly to [phone]. Unable to reach her niece, Christiane Dorsey who I spoke to yesterday to continue 24 hr call and review medications. 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 ..  Admission (Discharged) Brenton Montanez MD            Noted following upcoming appointments from discharge chart review:   [facility] follow up appointment(s):   Future Appointments   Date Time Provider Irene Shah   2021  2:10 PM Ladonna Muro MD [facility] [employer]   2021 10:00 AM STR MRI RM1 STRZ MRI STR Radiolog   2021 12:30 PM ILDA Larios [facility]   2021  1:40 PM Ladonna Muro MD [facility]     Non-[facility] follow up appointment(s): anthony

## 2021-07-26 ENCOUNTER — CARE COORDINATION (OUTPATIENT)
Dept: CASE MANAGEMENT | Age: 86
End: 2021-07-26

## 2021-07-26 ENCOUNTER — TELEPHONE (OUTPATIENT)
Dept: FAMILY MEDICINE CLINIC | Age: 86
End: 2021-07-26

## 2021-07-26 DIAGNOSIS — Z98.890 STATUS POST CRANIOTOMY: Primary | ICD-10-CM

## 2021-07-26 PROCEDURE — 1111F DSCHRG MED/CURRENT MED MERGE: CPT | Performed by: PHYSICAL MEDICINE & REHABILITATION

## 2021-07-26 NOTE — TELEPHONE ENCOUNTER
Hope Arriaza, [address] [facility] ([phone]) - called said that BP today was 107/45, pulse 75 after shower about 10 am today. At 1:30 pm today it was 110/56, pulse 100. Pt is reporting that when the therapist was there today he took her to the bathroom and BP dropped down by just walking, no dizziness or lightheadedness - does not have reading because the therapist was there. She was on Hydralazine but family held it over the weekend because BP on Friday was low also. The hydralazine has not been restarted. Pt says she has not slept at all for two nights, she used only Melatonin but it did not help by itself. They are going to try Xanax along with the Melatonin tonight.

## 2021-07-26 NOTE — CARE COORDINATION
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 .. Admission (Discharged) Wing Lechuga MD           Spoke to niEmperatriz keller but she was not able to give me any information other than Wade Zhu has not slept much for 2 nights and [address] was coming to give her something for sleep. Was not able to review other medications. Tried to call other niarianna Denson said she has seen her recently and would be there sometime today. Left  for Stephanie Holloway to call me back. [place], [place] but couldn't review medications. She said a BP med was taken away and not sure what the Dr said. Three nights ago,  had put Xanax and Melatonin in her med box and she was lethargic the next day so took out Xanax and now hasn't slept in 2 nights. Rambo Butler will text Stephanie Holloway to call me. Stephanie Holloway returned call and was not going to [address] until later this evening. Said [facility] [place] nurse was on her way to [address] and asked for me to call her. Spoke to nurse Pedro Cheng with [place], said that she just gave Latanya a Xanax, had her go lay down since she has not slept in 2 days. Reviewed medications, Hydralazine has been held d/t BP being a little low and  was notified. /55 114/60 without med. Wade Zhu is not taking Trazodone. She didn't think her appetite is back to normal yet but she is eating and drinking supplements. PT worked with Wade Zhu this morning. Pedro Cheng told Wade Zhu d/t her long hospital stay, it was going to take awhile to get her strength back. Will continue to follow.     Non-face-to-face services provided:  Scheduled appointment with PCP-8/3  Scheduled appointment with Rox Awad and reviewed discharge summary and/or continuity of care documents    Care Transitions 24 Hour Call    Schedule Follow Up Appointment with PCP: Completed  Do you have any ongoing symptoms?: No  Do you have all of your prescriptions and are they filled?: Yes  Have you been contacted by a [facility] Pharmacist?: No  Have you scheduled your follow up appointment?: Yes  How are you going to get to your appointment?: Car - family or friend to transport  Were you discharged with any Home Care or Post Acute Services: Yes  Post Acute Services: Home Health (Comment: Continued Care)  Do you feel like you have everything you need to keep you well at home?: Yes  Care Transitions Interventions     Transitions of Care Initial Call      Challenges to be reviewed by the provider   Additional needs identified to be addressed with provider: No  none             Method of communication with provider : none      Advance Care Planning:   Does patient have an Advance Directive: reviewed and current. Was this a readmission? No  Patient stated reason for admission: therapy  Patients top risk factors for readmission: falls, lack of knowledge about disease, medical condition-breast CA with mets to brain, [facility] and polypharmacy    Care Transition Nurse (CTN) contacted the caregiver by telephone to perform post hospital discharge assessment. Verified name and  with caregiver as identifiers. Provided introduction to self, and explanation of the CTN role. CTN reviewed discharge instructions, medical action plan and red flags with caregiver who verbalized understanding. Caregiver given an opportunity to ask questions and does not have any further questions or concerns at this time. Were discharge instructions available to patient? Yes.  Reviewed appropriate site of care based on symptoms and resources available to patient including: PCP, Specialist, Home health and When to call 911. The caregiver agrees to contact the PCP office for questions related to their healthcare. Medication reconciliation was performed with caregiver, who verbalizes understanding of administration of home medications. Advised obtaining a 90-day supply of all daily and as-needed medications. Covid Risk Education     Educated patient about risk for severe COVID-19 due to risk factors according to CDC guidelines. CTN reviewed discharge instructions, medical action plan and red flag symptoms with the caregiver who verbalized understanding. Discussed COVID vaccination status: Yes. Education provided on COVID-19 vaccination as appropriate. Discussed exposure protocols and quarantine with CDC Guidelines. Caregiver was given an opportunity to verbalize any questions and concerns and agrees to contact CTN or health care provider for questions related to their healthcare. Reviewed and educated caregiver on any new and changed medications related to discharge diagnosis. Was patient discharged with a pulse oximeter? No Discussed and confirmed pulse oximeter discharge instructions and when to notify provider or seek emergency care. CTN provided contact information. Plan for follow-up call in 5-7 days based on severity of symptoms and risk factors. Plan for next call: symptom management-BP readings? and self management-sleep?       Follow Up  Future Appointments   Date Time Provider Irene Shah   8/3/2021  3:30 PM Jenifer Hampton MD [facility]   8/17/2021 10:00 AM STR MRI RM1 STRZ MRI STR Radiolog   8/27/2021 12:30 PM Meagan Hurtado Chaparro [facility]   9/21/2021  1:40 PM Jenifer Hampton MD [address] [place] [address]

## 2021-07-27 ENCOUNTER — TELEPHONE (OUTPATIENT)
Dept: FAMILY MEDICINE CLINIC | Age: 86
End: 2021-07-27

## 2021-07-27 NOTE — TELEPHONE ENCOUNTER
Pt req a script for [employer] large adults wash clothes   Usually get the through a [facility]. Is that something her  Here would send in for her?  To get these in the mail

## 2021-07-29 ENCOUNTER — TELEPHONE (OUTPATIENT)
Dept: FAMILY MEDICINE CLINIC | Age: 86
End: 2021-07-29

## 2021-07-29 NOTE — TELEPHONE ENCOUNTER
Chioma Gordon, [address] ([phone]) - Have been monitoring her BP and nurse said this morning BP was 118/42, pulse 69. PT at her house now and after activity her BP is 84/43, pulse 68. She is not symptomatic, no dizziness, etc.  BP typically runs lower. Just wondering if she should be evaluated.

## 2021-07-29 NOTE — TELEPHONE ENCOUNTER
Please call needs to stop hydralazine and other meds.  Please check if she had Proamatine( Midodrin)

## 2021-07-30 RX ORDER — MIDODRINE HYDROCHLORIDE 2.5 MG/1
2.5 TABLET ORAL 3 TIMES DAILY
Qty: 90 TABLET | Refills: 3 | Status: ON HOLD | OUTPATIENT
Start: 2021-07-30 | End: 2021-11-01 | Stop reason: HOSPADM

## 2021-07-30 NOTE — TELEPHONE ENCOUNTER
Rosa Page informed by phone. She stated the pt does not have proamatine on hand, she stated she is starting chemo on Monday. Please advise.

## 2021-08-03 ENCOUNTER — OFFICE VISIT (OUTPATIENT)
Dept: FAMILY MEDICINE CLINIC | Age: 86
End: 2021-08-03

## 2021-08-03 VITALS
BODY MASS INDEX: 26.6 KG/M2 | WEIGHT: 135.5 LBS | RESPIRATION RATE: 16 BRPM | HEIGHT: 60 IN | HEART RATE: 64 BPM | SYSTOLIC BLOOD PRESSURE: 122 MMHG | TEMPERATURE: 95.5 F | DIASTOLIC BLOOD PRESSURE: 44 MMHG

## 2021-08-03 DIAGNOSIS — R42 DIZZINESS: Primary | ICD-10-CM

## 2021-08-03 DIAGNOSIS — E03.4 HYPOTHYROIDISM DUE TO ACQUIRED ATROPHY OF THYROID: ICD-10-CM

## 2021-08-03 DIAGNOSIS — Z98.890 STATUS POST CRANIOTOMY: ICD-10-CM

## 2021-08-03 DIAGNOSIS — I95.0 IDIOPATHIC HYPOTENSION: ICD-10-CM

## 2021-08-03 DIAGNOSIS — C79.31 BREAST CANCER METASTASIZED TO BRAIN, RIGHT (HCC): ICD-10-CM

## 2021-08-03 DIAGNOSIS — C50.911 BREAST CANCER METASTASIZED TO BRAIN, RIGHT (HCC): ICD-10-CM

## 2021-08-03 PROCEDURE — 1111F DSCHRG MED/CURRENT MED MERGE: CPT | Performed by: EMERGENCY MEDICINE

## 2021-08-03 PROCEDURE — 99495 TRANSJ CARE MGMT MOD F2F 14D: CPT | Performed by: EMERGENCY MEDICINE

## 2021-08-03 ASSESSMENT — ENCOUNTER SYMPTOMS
COUGH: 0
EYE PAIN: 0
BACK PAIN: 0
CONSTIPATION: 0
TROUBLE SWALLOWING: 0
SINUS PRESSURE: 0
CHEST TIGHTNESS: 0
VOICE CHANGE: 0
PHOTOPHOBIA: 0
ABDOMINAL PAIN: 0
SORE THROAT: 0
ABDOMINAL DISTENTION: 0
DIARRHEA: 0
BLOOD IN STOOL: 0
VOMITING: 0
SHORTNESS OF BREATH: 0
RHINORRHEA: 0
WHEEZING: 0
NAUSEA: 0

## 2021-08-03 NOTE — PROGRESS NOTES
Post-Discharge Transitional Care Management Services or Hospital Follow Up      Latanya Rod   YOB: 1933    Date of Office Visit:  8/3/2021  Date of Hospital Admission: 7/8/21  Date of Hospital Discharge: 7/21/21  Readmission Risk Score(high >=14%.  Medium >=10%):Readmission Risk Score: 30      Care management risk score Rising risk (score 2-5) and Complex Care (Scores >=6): 4     Non face to face  following discharge, date last encounter closed (first attempt may have been earlier): 7/26/2021  2:02 PM 7/26/2021  2:02 PM    Call initiated 2 business days of discharge: Yes     Patient Active Problem List   Diagnosis    GERD (gastroesophageal reflux disease)    Temporal arteritis ([address]    Hypercholesteremia    Hypothyroidism due to acquired atrophy of thyroid    Steroid-induced hyperglycemia    Blind right eye    Current chronic use of systemic steroids    Personal history of cardiac dysrhythmia    Coronary artery disease involving native coronary artery of native heart without angina pectoris    Anxiety    H/O: CVA (cerebrovascular accident)    Lumbosacral spinal stenosis    Vitamin D deficiency    Hx of breast cancer with liver mets    Breast cancer metastasized to liver ([address]    Fall    Traumatic ecchymosis of forehead    Brain mass    Subarachnoid hemorrhage following injury, no loss of consciousness ([address]    Platelet inhibition due to Plavix    Fall at home, initial encounter    Brain edema ([address]    Intraparenchymal hemorrhage of brain ([address]    Closed head injury    Breast cancer metastasized to brain, right ([address]    Status post craniotomy    Impaired functional mobility, balance, gait, and endurance    Acute cystitis without hematuria       Allergies   Allergen Reactions    Bactrim [Sulfamethoxazole-Trimethoprim] Swelling     Of tongue    Demerol Rash     nausea    Pcn [Penicillins] Rash       Medications listed as ordered at the time of discharge from hospital Latanya Rod   Home Medication Instructions MARCELINA:    Printed on:08/03/21 0064   Medication Information                      acetaminophen (TYLENOL) 500 MG tablet  Take 1,000 mg by mouth every 8 hours as needed for Pain              ALPRAZolam (XANAX) 0.25 MG tablet  Take 1 tablet by mouth nightly as needed for Anxiety for up to 30 days.              Calcium Carbonate-Vitamin D (OYSTER SHELL CALCIUM/D) 500-200 MG-UNIT TABS  take 1 tablet by mouth twice a day             docusate sodium (COLACE, DULCOLAX) 100 MG CAPS  Take 100 mg by mouth 2 times daily             isosorbide mononitrate (IMDUR) 30 MG extended release tablet  take 1 tablet by mouth once daily             levETIRAcetam (KEPPRA) 500 MG tablet  Take 1 tablet by mouth 2 times daily             levothyroxine (SYNTHROID) 100 MCG tablet  take 1 tablet by mouth once daily             lidocaine 4 % external patch  Apply 2 patches topically daily             loratadine (CLARITIN) 10 MG tablet  take 1 tablet by mouth once daily             melatonin 3 MG TABS tablet  Take 1 tablet by mouth nightly as needed (insomnia)             midodrine (PROAMATINE) 2.5 MG tablet  Take 1 tablet by mouth 3 times daily             Multiple Vitamins-Minerals (CENTRUM SILVER ADULT 50+) TABS  Take 1 tablet by mouth daily             Nutritional Supplements (ENSURE ORIGINAL) LIQD  Take 1 Can by mouth 2 times daily             pantoprazole (PROTONIX) 40 MG tablet  take 1 tablet by mouth once daily             polyethylene glycol (GLYCOLAX) 17 g packet  Take 17 g by mouth daily             promethazine (PHENERGAN) 25 MG tablet  Take 1 tablet by mouth every 6 hours as needed for Nausea (and vomiting)             RA ASPIRIN EC ADULT LOW ST 81 MG EC tablet  take 1 tablet by mouth once daily             RA BIOTIN 1000 MCG TABS  Take 1,000 mcg by mouth daily              sertraline (ZOLOFT) 50 MG tablet  take 1 tablet by mouth twice a day             simvastatin (ZOCOR) 20 MG tablet  take 1 tablet by mouth every evening             traZODone (DESYREL) 50 MG tablet  take 1 tablet by mouth at bedtime if needed for sleep             vitamin D (ERGOCALCIFEROL) 1.25 MG (92297 UT) CAPS capsule  take 1 capsule by mouth every week                   Medications marked \"taking\" at this time  Outpatient Medications Marked as Taking for the 8/3/21 encounter (Office Visit) with Charito Mercado MD   Medication Sig Dispense Refill    midodrine (PROAMATINE) 2.5 MG tablet Take 1 tablet by mouth 3 times daily 90 tablet 3    ALPRAZolam (XANAX) 0.25 MG tablet Take 1 tablet by mouth nightly as needed for Anxiety for up to 30 days.  30 tablet 0    levETIRAcetam (KEPPRA) 500 MG tablet Take 1 tablet by mouth 2 times daily 60 tablet 3    lidocaine 4 % external patch Apply 2 patches topically daily 3 box 1    docusate sodium (COLACE, DULCOLAX) 100 MG CAPS Take 100 mg by mouth 2 times daily 60 capsule 1    polyethylene glycol (GLYCOLAX) 17 g packet Take 17 g by mouth daily 527 g 1    melatonin 3 MG TABS tablet Take 1 tablet by mouth nightly as needed (insomnia) 30 tablet 3    traZODone (DESYREL) 50 MG tablet take 1 tablet by mouth at bedtime if needed for sleep 30 tablet 0    simvastatin (ZOCOR) 20 MG tablet take 1 tablet by mouth every evening 90 tablet 1    RA BIOTIN 1000 MCG TABS Take 1,000 mcg by mouth daily       loratadine (CLARITIN) 10 MG tablet take 1 tablet by mouth once daily 90 tablet 1    Nutritional Supplements (ENSURE ORIGINAL) LIQD Take 1 Can by mouth 2 times daily 60 Bottle 5    pantoprazole (PROTONIX) 40 MG tablet take 1 tablet by mouth once daily 90 tablet 1    sertraline (ZOLOFT) 50 MG tablet take 1 tablet by mouth twice a day 180 tablet 1    isosorbide mononitrate (IMDUR) 30 MG extended release tablet take 1 tablet by mouth once daily 90 tablet 1    Calcium Carbonate-Vitamin D (OYSTER SHELL CALCIUM/D) 500-200 MG-UNIT TABS take 1 tablet by mouth twice a day 180 tablet 3    levothyroxine (SYNTHROID) 100 MCG tablet take 1 tablet by mouth once daily 90 tablet 1    RA ASPIRIN EC ADULT LOW ST 81 MG EC tablet take 1 tablet by mouth once daily      vitamin D (ERGOCALCIFEROL) 1.25 MG (98118 UT) CAPS capsule take 1 capsule by mouth every week      promethazine (PHENERGAN) 25 MG tablet Take 1 tablet by mouth every 6 hours as needed for Nausea (and vomiting) 12 tablet 0    acetaminophen (TYLENOL) 500 MG tablet Take 1,000 mg by mouth every 8 hours as needed for Pain       Multiple Vitamins-Minerals (CENTRUM SILVER ADULT 50+) TABS Take 1 tablet by mouth daily 90 tablet 1        Medications patient taking as of now reconciled against medications ordered at time of hospital discharge: Yes    Chief Complaint   Patient presents with    Follow-Up from Hospital       HPI    Inpatient course: Discharge summary reviewed- see chart. Interval history/Current status: seen Dr Nadja Connelly and had 1st chemo yesterday. Erin Garcia is staying with her. She has dizziness once in a while. . Want Zanax for her sleep. She is not on Norco anymore    Review of Systems   Constitutional: Negative for appetite change, chills, diaphoresis, fatigue and fever. HENT: Negative for congestion, ear discharge, ear pain, postnasal drip, rhinorrhea, sinus pressure, sneezing, sore throat, trouble swallowing and voice change. Eyes: Negative for photophobia, pain and visual disturbance. Respiratory: Negative for cough, chest tightness, shortness of breath and wheezing. Cardiovascular: Negative for chest pain, palpitations and leg swelling. Gastrointestinal: Negative for abdominal distention, abdominal pain, blood in stool, constipation, diarrhea, nausea and vomiting. Genitourinary: Negative for dysuria, flank pain, frequency, genital sores, hematuria, urgency, vaginal bleeding and vaginal discharge. Musculoskeletal: Negative for arthralgias, back pain, gait problem, joint swelling, myalgias, neck pain and neck stiffness.    Skin: Negative. Negative for pallor and rash. Neurological: Positive for dizziness. Negative for tremors, syncope, speech difficulty, weakness, light-headedness, numbness and headaches. Hematological: Does not bruise/bleed easily. Psychiatric/Behavioral: Negative for agitation, confusion, dysphoric mood, hallucinations, sleep disturbance and suicidal ideas. The patient is not nervous/anxious. All other systems reviewed and are negative. Vitals:    08/03/21 1550   BP: (!) 122/44   Site: Right Upper Arm   Position: Sitting   Cuff Size: Medium Adult   Pulse: 64   Resp: 16   Temp: 95.5 °F (35.3 °C)   TempSrc: Skin   Weight: 135 lb 8 oz (61.5 kg)   Height: 5' (1.524 m)     Body mass index is 26.46 kg/m². Wt Readings from Last 3 Encounters:   08/03/21 135 lb 8 oz (61.5 kg)   07/20/21 133 lb 3.2 oz (60.4 kg)   07/08/21 133 lb 12.8 oz (60.7 kg)     BP Readings from Last 3 Encounters:   08/03/21 (!) 122/44   07/21/21 (!) 144/60   07/08/21 (!) 152/69       Physical Exam  Vitals reviewed. Constitutional:       Appearance: She is well-developed. HENT:      Head: Normocephalic and atraumatic. Right Ear: External ear normal.      Left Ear: External ear normal.      Nose: Nose normal.   Eyes:      General: No scleral icterus. Conjunctiva/sclera: Conjunctivae normal.      Pupils: Pupils are equal, round, and reactive to light. Neck:      Thyroid: No thyromegaly. Vascular: No JVD. Cardiovascular:      Rate and Rhythm: Normal rate and regular rhythm. Heart sounds: No murmur heard. No friction rub. Pulmonary:      Effort: Pulmonary effort is normal.      Breath sounds: Normal breath sounds. No wheezing or rales. Chest:      Chest wall: No tenderness. Abdominal:      General: Bowel sounds are normal.      Palpations: Abdomen is soft. There is no mass. Tenderness: There is no abdominal tenderness. Musculoskeletal:      Cervical back: Normal range of motion and neck supple. tablet take 1 tablet by mouth once daily 90 tablet 1    sertraline (ZOLOFT) 50 MG tablet take 1 tablet by mouth twice a day 180 tablet 1    isosorbide mononitrate (IMDUR) 30 MG extended release tablet take 1 tablet by mouth once daily 90 tablet 1    Calcium Carbonate-Vitamin D (OYSTER SHELL CALCIUM/D) 500-200 MG-UNIT TABS take 1 tablet by mouth twice a day 180 tablet 3    levothyroxine (SYNTHROID) 100 MCG tablet take 1 tablet by mouth once daily 90 tablet 1    RA ASPIRIN EC ADULT LOW ST 81 MG EC tablet take 1 tablet by mouth once daily      vitamin D (ERGOCALCIFEROL) 1.25 MG (56750 UT) CAPS capsule take 1 capsule by mouth every week      promethazine (PHENERGAN) 25 MG tablet Take 1 tablet by mouth every 6 hours as needed for Nausea (and vomiting) 12 tablet 0    acetaminophen (TYLENOL) 500 MG tablet Take 1,000 mg by mouth every 8 hours as needed for Pain       Multiple Vitamins-Minerals (CENTRUM SILVER ADULT 50+) TABS Take 1 tablet by mouth daily 90 tablet 1     No current facility-administered medications for this visit. Return in about 6 weeks (around 9/14/2021) for Blood  pressure. Patient to return immediately for follow-up if having more problems and concerns.       Zuhair Castro MD

## 2021-08-04 ENCOUNTER — CARE COORDINATION (OUTPATIENT)
Dept: CASE MANAGEMENT | Age: 86
End: 2021-08-04

## 2021-08-04 NOTE — CARE COORDINATION
Care Transitions Outreach Attempt    Call within 2 business days of discharge: Yes   Attempted to reach patient for subsequent transitional call. VM left to return call to [phone]. Patient: Cheyanne Adames Patient : 1933 MRN: [id number]    Last Discharge [facility]       Complaint Diagnosis Description Type Department Provider    21  Breast cancer metastasized to brain, right ([address] . ..  Admission (Discharged) Priyanka Upton MD            Noted following upcoming appointments from discharge chart review:   [facility] follow up appointment(s):   Future Appointments   Date Time Provider Irene Shah   2021 10:00 AM STR MRI RM1 STRZ MRI STR Radiolog   2021 12:30 PM ILDA Coello SRPXNEUR[facility] Vitaliy Titus   2021  1:40 PM Kenzie Garcia MD [facility] [address]     Non-[facility] follow up appointment(s): anthony

## 2021-08-06 ENCOUNTER — CARE COORDINATION (OUTPATIENT)
Dept: CASE MANAGEMENT | Age: 86
End: 2021-08-06

## 2021-08-06 NOTE — CARE COORDINATION
Care Transitions Outreach Attempt    Call within 2 business days of discharge: Yes   Attempted to reach patient for subsequent transitional call. VM left to return call to [phone]. If no return call, CTN will sign off-2nd attempt. Patient: Rupert Shore Patient : 1933 MRN: [id number]    Last Discharge [facility]       Complaint Diagnosis Description Type Department Provider    21  Breast cancer metastasized to brain, right ([address] . ..  Admission (Discharged) Wero Puente MD            Noted following upcoming appointments from discharge chart review:   [facility] follow up appointment(s):   Future Appointments   Date Time Provider Irene Shah   2021 10:00 AM STR MRI RM1 STRZ MRI STR Radiolog   2021 12:30 PM ILDA De Los Santos [facility] [place] [facility]   2021  1:40 PM Lyssa Nino MD [facility] [address]     Non-[facility] follow up appointment(s): anthony
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
## 2021-08-09 ENCOUNTER — NURSE ONLY (OUTPATIENT)
Dept: LAB | Age: 86
End: 2021-08-09

## 2021-08-09 LAB
BASOPHILS # BLD: 0.1 %
BASOPHILS ABSOLUTE: 0 THOU/MM3 (ref 0–0.1)
EOSINOPHIL # BLD: 2.4 %
EOSINOPHILS ABSOLUTE: 0.2 THOU/MM3 (ref 0–0.4)
ERYTHROCYTE [DISTWIDTH] IN BLOOD BY AUTOMATED COUNT: 14.6 % (ref 11.5–14.5)
ERYTHROCYTE [DISTWIDTH] IN BLOOD BY AUTOMATED COUNT: 52.3 FL (ref 35–45)
HCT VFR BLD CALC: 35.2 % (ref 37–47)
HEMOGLOBIN: 10.5 GM/DL (ref 12–16)
IMMATURE GRANS (ABS): 0.06 THOU/MM3 (ref 0–0.07)
IMMATURE GRANULOCYTES: 0.8 %
LYMPHOCYTES # BLD: 29 %
LYMPHOCYTES ABSOLUTE: 2.2 THOU/MM3 (ref 1–4.8)
MCH RBC QN AUTO: 29.3 PG (ref 26–33)
MCHC RBC AUTO-ENTMCNC: 29.8 GM/DL (ref 32.2–35.5)
MCV RBC AUTO: 98.3 FL (ref 81–99)
MONOCYTES # BLD: 6.3 %
MONOCYTES ABSOLUTE: 0.5 THOU/MM3 (ref 0.4–1.3)
NUCLEATED RED BLOOD CELLS: 0 /100 WBC
PLATELET # BLD: 223 THOU/MM3 (ref 130–400)
PMV BLD AUTO: 11.3 FL (ref 9.4–12.4)
RBC # BLD: 3.58 MILL/MM3 (ref 4.2–5.4)
SEG NEUTROPHILS: 61.4 %
SEGMENTED NEUTROPHILS ABSOLUTE COUNT: 4.7 THOU/MM3 (ref 1.8–7.7)
WBC # BLD: 7.6 THOU/MM3 (ref 4.8–10.8)

## 2021-08-12 ENCOUNTER — TELEPHONE (OUTPATIENT)
Dept: FAMILY MEDICINE CLINIC | Age: 86
End: 2021-08-12

## 2021-08-12 DIAGNOSIS — F41.9 ANXIETY: ICD-10-CM

## 2021-08-12 DIAGNOSIS — C50.911 BREAST CANCER METASTASIZED TO BRAIN, RIGHT (HCC): ICD-10-CM

## 2021-08-12 DIAGNOSIS — C79.31 BREAST CANCER METASTASIZED TO BRAIN, RIGHT (HCC): ICD-10-CM

## 2021-08-12 DIAGNOSIS — C50.919 CARCINOMA OF BREAST METASTATIC TO LIVER, UNSPECIFIED LATERALITY (HCC): ICD-10-CM

## 2021-08-12 DIAGNOSIS — C78.7 CARCINOMA OF BREAST METASTATIC TO LIVER, UNSPECIFIED LATERALITY (HCC): ICD-10-CM

## 2021-08-12 RX ORDER — ALPRAZOLAM 0.25 MG/1
0.25 TABLET ORAL 2 TIMES DAILY PRN
Qty: 30 TABLET | Refills: 2 | Status: SHIPPED | OUTPATIENT
Start: 2021-08-12 | End: 2021-10-05 | Stop reason: SDUPTHER

## 2021-08-12 NOTE — TELEPHONE ENCOUNTER
Quita Perera from [place] [address] called req refof Xanax. She says Latanya's anxiety level is up and she thought maybe adding a Xanax in the am or increasing the Zoloft would help.     She will need a refill of the Xanax to [facility] on [facility].    Please call Quita Perera

## 2021-08-17 ENCOUNTER — HOSPITAL ENCOUNTER (OUTPATIENT)
Dept: MRI IMAGING | Age: 86
Discharge: HOME | End: 2021-08-17
Payer: MEDICARE

## 2021-08-17 ENCOUNTER — HOSPITAL ENCOUNTER (OUTPATIENT)
Dept: NON INVASIVE DIAGNOSTICS | Age: 86
Discharge: HOME | End: 2021-08-17
Payer: MEDICARE

## 2021-08-17 DIAGNOSIS — C50.911 BREAST CANCER METASTASIZED TO BRAIN, RIGHT (HCC): ICD-10-CM

## 2021-08-17 DIAGNOSIS — C79.31 BREAST CANCER METASTASIZED TO BRAIN, RIGHT (HCC): ICD-10-CM

## 2021-08-17 LAB
LV EF: 65 %
LVEF MODALITY: NORMAL

## 2021-08-17 PROCEDURE — 93306 TTE W/DOPPLER COMPLETE: CPT

## 2021-08-17 PROCEDURE — A9579 GAD-BASE MR CONTRAST NOS,1ML: HCPCS | Performed by: NEUROLOGICAL SURGERY

## 2021-08-17 PROCEDURE — 70553 MRI BRAIN STEM W/O & W/DYE: CPT

## 2021-08-17 PROCEDURE — 6360000004 HC RX CONTRAST MEDICATION: Performed by: NEUROLOGICAL SURGERY

## 2021-08-17 RX ADMIN — GADOTERIDOL 10 ML: 279.3 INJECTION, SOLUTION INTRAVENOUS at 10:25

## 2021-08-22 NOTE — DISCHARGE SUMMARY
[address] [id number]                               DISCHARGE SUMMARY    PATIENT NAME: Geeta Webb                      :        1933  MED REC NO:   [id number]                           ROOM:       0010  ACCOUNT NO:   [de-identified]                           ADMIT DATE: 2021  PROVIDER:     Alina Laguna. Anupam Escobar M.D.            Torey Adenarita: 2021    DISCHARGE FINAL DIAGNOSES:  1. Breast cancer, metastasized to the brain. 2.  Impaired functional mobility, balance, gait, and endurance. 3.  Recurrent falls. 4.  Craniotomy done during this hospitalization. 5.  Subarachnoid hemorrhage following a fall. 6.  Traumatic ecchymosis on the left lower extremity, left forearm. 7.  History of breast cancer with liver metastasis, now metastasized to  the brain. 8.  Chronic steroid use. 9.  History of polyarteritis, history of temporal arteritis. 10.  Coronary artery disease. 11. Anxiety. 12.  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 13.  Hypothyroidism. 14.  GERD. 15.  Brain edema. MEDICATIONS: To rehab unit include:  1. Trazodone 50 mg one tablet at night. 2.  Zocor 20 mg one tablet daily. 3.  Claritin 10 mg one tablet daily. 4.  Protonix 40 mg one tablet daily. 5.  Zoloft 50 mg one tablet daily. 6.  Imdur 30 mg one tablet daily. 7.  Plavix 75 mg one tablet daily. 8.  Calcium carbonate one tablet twice a day. 9.  Synthroid 100 mcg one tablet daily. 10.  Aspirin 81 mg one tablet daily. 11.  Vitamin D 50,000 units weekly. 12.  Prednisone 1 mg three tablets daily. 13.  Multivitamins one tablet daily. 14.  Biotin 1000 mcg daily. 15.  Ensure one can twice a day. 16.  Phenergan 25 mg every six hours as needed for nausea and vomiting. 17.  Tylenol 500 mg two tablets every 8 hours for pain and headache  p.r.n. FOLLOWUP:  Will be by Dr. Don Jacques and Dr. Glenny Ho from physiatry  services.    Bradley from primary care services. ACTIVITY:  Will be per rehab unit. The patient will have occupational  and physical therapy and speech therapy at the rehab unit. PROCEDURES DURING THIS HOSPITALIZATION:  Craniotomy done by Dr. Praneeth Carroll of  Neurosurgery/removal of a brain mass. Craniotomy was done on  07/02/2021, includes right posterior craniotomy, surgical resection of  the left cerebellar intraparenchymal tumor, right temporal lobe. Trauma  services admitting the patient, Dr. Jenae Ruano, Dr. Evita Bah  from Critical Care/pulmonary service, ICU services, speech therapist,  physical and occupational therapist.    HISTORY AND PHYSICAL:  The patient is an 80-year-old female admitted  under trauma services on 06/28/2021 secondary to a fall. The patient  fell in the early morning of admission when the patient was trying to go  back to her bed from the bathroom when her legs gave out, became weak,  and she fell. The patient hit her forehead onto the nightstand. Denies  any loss of consciousness. She was found at home by a home health aide  around 8 a.m. as she was not able to get up herself after falling down. The EMS had been called to her home multiple times in a few weeks to  assist her, however, the patient refused to go to the hospital to be  evaluated. The patient reported her leg has been progressively becoming  weak. Denies any headache. No neck pain. No chest pain or abdominal  pain. She does admit that her lower back and lower extremities are  weak. PHYSICAL EXAMINATION:  VITAL SIGNS:  On admission, blood pressure 162/57, temperature 98, pulse  62, respirations 16, saturation 94%. The patient's Breanne Coma Scale  is 15 on admission. LUNGS:  Airway is patent. Breathing. Good air exchange, bilateral.   Clear to auscultation. HEENT:  Pupils are equal and reactive to light. GENERAL:  Oriented x3. NEUROLOGIC:  Cranial nerves II through XII are all intact. No deficits.   NECK: Currently immobilized; however, there are no crepitations. BACK:  Midline, no step-up. CHEST WALL:  Symmetrical.  No retractions without any tenderness. HEART:  Regular rate and rhythm. ABDOMEN:  Soft, depressible, nontender. HOSPITAL COURSE:  The patient was admitted to ICU under trauma service,  Dr. Jose Antonio Ryan and trauma service team.  Crissie Sona was done to Dr. Areli Villegas,  neurosurgeon, regarding the brain mass and bleeding. The patient was  assisted at the ICU by Dr. Shirley Pruett. The patient had a craniotomy done by  Dr. Areli Villegas subsequently on 07/02/2021. The patient tolerated well the  procedure, was sent back to ICU postsurgery. The patient stayed in the  ICU for nine days. The patient's Hematology/Oncology, Dr. Jovanny Garcia, was  also consulted. The patient was transferred out from ICU on 07/07/2021. The patient, however, continued to be having gait balance, mobility, and  endurance dysfunction for which the patient was advised regarding  rehabilitation. The patient also was seen by radiation oncologist, Dr. Jenn Patel. The patient also was stabilized by Dr. Arpan Lennon who graciously  admitted the patient on 07/08/2021. The patient was transferred in  stable condition to rehab unit on this day, 07/08/2021. CYNTHIA Lutz M.D.    D: 08/20/2021 16:01:17       T: 08/21/2021 2:10:01     [email or web address]  Job#: [id number]     Doc#: [id number]    CC:

## 2021-08-23 ENCOUNTER — TELEPHONE (OUTPATIENT)
Dept: FAMILY MEDICINE CLINIC | Age: 86
End: 2021-08-23

## 2021-08-23 DIAGNOSIS — R60.0 LOCALIZED EDEMA: Primary | ICD-10-CM

## 2021-08-25 ENCOUNTER — TELEPHONE (OUTPATIENT)
Dept: NEUROSURGERY | Age: 86
End: 2021-08-25

## 2021-08-25 NOTE — TELEPHONE ENCOUNTER
Patients yarelis Leonard ( on [employer]) calling in for patient who is scheduled for a hospital follow up on 8/27/2021 with Ирина Hare. Aisha is calling in because yesterday they noticed a decline in the patients hearing, and today the home aid called her stating she is having a lot of trouble with her hands and holding onto things, and they have noticed a decline in her sight also. Aisha wasn't sure who to call with the symptoms or what to do. Please call her back to advise.

## 2021-08-27 ENCOUNTER — OFFICE VISIT (OUTPATIENT)
Dept: FAMILY MEDICINE CLINIC | Age: 86
End: 2021-08-27

## 2021-08-27 ENCOUNTER — OFFICE VISIT (OUTPATIENT)
Dept: NEUROSURGERY | Age: 86
End: 2021-08-27

## 2021-08-27 VITALS
WEIGHT: 135.25 LBS | BODY MASS INDEX: 26.55 KG/M2 | TEMPERATURE: 97 F | HEART RATE: 88 BPM | HEIGHT: 60 IN | DIASTOLIC BLOOD PRESSURE: 62 MMHG | SYSTOLIC BLOOD PRESSURE: 128 MMHG | RESPIRATION RATE: 16 BRPM

## 2021-08-27 VITALS
WEIGHT: 135.58 LBS | HEART RATE: 81 BPM | HEIGHT: 60 IN | BODY MASS INDEX: 26.62 KG/M2 | DIASTOLIC BLOOD PRESSURE: 60 MMHG | SYSTOLIC BLOOD PRESSURE: 102 MMHG

## 2021-08-27 DIAGNOSIS — H61.23 BILATERAL IMPACTED CERUMEN: ICD-10-CM

## 2021-08-27 DIAGNOSIS — Z98.890 STATUS POST CRANIOTOMY: Primary | ICD-10-CM

## 2021-08-27 DIAGNOSIS — I61.9 INTRAPARENCHYMAL HEMORRHAGE OF BRAIN (HCC): Primary | ICD-10-CM

## 2021-08-27 DIAGNOSIS — H91.93 DECREASED HEARING OF BOTH EARS: ICD-10-CM

## 2021-08-27 PROCEDURE — G8417 CALC BMI ABV UP PARAM F/U: HCPCS | Performed by: PHYSICIAN ASSISTANT

## 2021-08-27 PROCEDURE — G8427 DOCREV CUR MEDS BY ELIG CLIN: HCPCS | Performed by: PHYSICIAN ASSISTANT

## 2021-08-27 PROCEDURE — G8417 CALC BMI ABV UP PARAM F/U: HCPCS | Performed by: FAMILY MEDICINE

## 2021-08-27 PROCEDURE — 99024 POSTOP FOLLOW-UP VISIT: CPT | Performed by: PHYSICIAN ASSISTANT

## 2021-08-27 PROCEDURE — 1123F ACP DISCUSS/DSCN MKR DOCD: CPT | Performed by: FAMILY MEDICINE

## 2021-08-27 PROCEDURE — G8427 DOCREV CUR MEDS BY ELIG CLIN: HCPCS | Performed by: FAMILY MEDICINE

## 2021-08-27 PROCEDURE — 1036F TOBACCO NON-USER: CPT | Performed by: FAMILY MEDICINE

## 2021-08-27 PROCEDURE — 1123F ACP DISCUSS/DSCN MKR DOCD: CPT | Performed by: PHYSICIAN ASSISTANT

## 2021-08-27 PROCEDURE — 99213 OFFICE O/P EST LOW 20 MIN: CPT | Performed by: FAMILY MEDICINE

## 2021-08-27 PROCEDURE — 1090F PRES/ABSN URINE INCON ASSESS: CPT | Performed by: PHYSICIAN ASSISTANT

## 2021-08-27 PROCEDURE — 4040F PNEUMOC VAC/ADMIN/RCVD: CPT | Performed by: PHYSICIAN ASSISTANT

## 2021-08-27 PROCEDURE — 4040F PNEUMOC VAC/ADMIN/RCVD: CPT | Performed by: FAMILY MEDICINE

## 2021-08-27 PROCEDURE — 1090F PRES/ABSN URINE INCON ASSESS: CPT | Performed by: FAMILY MEDICINE

## 2021-08-27 PROCEDURE — 1036F TOBACCO NON-USER: CPT | Performed by: PHYSICIAN ASSISTANT

## 2021-08-27 RX ORDER — ONDANSETRON 4 MG/1
4 TABLET, ORALLY DISINTEGRATING ORAL EVERY 8 HOURS PRN
Status: ON HOLD | COMMUNITY
Start: 2021-08-05 | End: 2022-08-24 | Stop reason: HOSPADM

## 2021-08-27 ASSESSMENT — ENCOUNTER SYMPTOMS
DIARRHEA: 0
SHORTNESS OF BREATH: 0
ABDOMINAL PAIN: 0
COUGH: 0
VOMITING: 0
CHEST TIGHTNESS: 0
CONSTIPATION: 0
NAUSEA: 0

## 2021-08-27 NOTE — PROGRESS NOTES
encounter. ·      Assessment/Plan:  · Status Post craniotomy and tumor evacuation. · Doing well overall  · Encouraged gradual increase in physical and mental activity. · Fall precaution and home safety education provided to patient. · Follow-up: Month follow-up with new MRI of the brain.       Electronically signed by Joshua Dewitt PA-C on 8/27/21 at 12:26 PM EDT

## 2021-08-27 NOTE — PROGRESS NOTES
diverticulosis 8/04    Spondylolisthesis of lumbosacral region 8/04    Steroid-induced hyperglycemia     Subclavian artery stenosis (HCC)     left    Superior and Inferior Pubic ramus fracture, right, closed, initial encounter ([facility] [address] 05/21/2019    SVT (supraventricular tachycardia) ([facility] [address] 6/07    Temporal arteritis ([facility] [address]     Vertebral artery occlusion     left    Vitamin D deficiency 06/2017       Past Surgical History:   Procedure Laterality Date    CARDIAC CATHETERIZATION  5/07    CATARACT REMOVAL  right 1/08; left 2/08    CHOLECYSTECTOMY  1/03    COLONOSCOPY  11/06    COLOSTOMY  09/04/2018    REVERSAL OF COLOSTOMY    CRANIOTOMY Right 7/2/2021    CRANIOTOMY FOR RESECTION/DEBULKING OF RIGHT SIDE INTRA BRAIN TUMOR performed by Prisca Hernandez MD at [facility]      ENDOSCOPY, COLON, DIAGNOSTIC      EYE REMOVAL Right 03/2017    EYE SURGERY      EYE SURGERY Right 11/06/2017    Dr Haleigh Israel in [address]      partial    OTHER SURGICAL HISTORY  05/27/2016    robotic assisted laparoscopic anterior colon resection and end colostomy    [place] OFFICE/OUTPT VISIT,PROCEDURE ONLY N/A 9/4/2018    COLOSTOMY REVERSAL AND PARASTOMAL HERNIA REPAIR performed by Lluvia Rapp MD at [address] [facility]  2002    UPPER GASTROINTESTINAL ENDOSCOPY  11/06       Family History   Problem Relation Age of Onset    Cancer Sister 61        breast    Cancer Brother 79        lung    Cancer Brother 68        colon    Cancer Son 48        lung     Subjective:     Review of Systems   Constitutional: Negative for activity change, appetite change, diaphoresis, fatigue and fever. HENT: Positive for hearing loss (bilaterally since monday ). Eyes: Positive for visual disturbance ( she is having problems with her left eye since she had her brain surgery. she has been seen by ophthalmology in the past Dr. Christina Rizo  ).    Respiratory: Negative for cough, chest tightness and shortness of breath. Cardiovascular: Negative for chest pain, palpitations and leg swelling. Gastrointestinal: Negative for abdominal pain, constipation, diarrhea, nausea and vomiting. Genitourinary: Negative. Musculoskeletal: Negative. Skin: Negative. Negative for rash. Neurological: Negative for dizziness, syncope, weakness, light-headedness, numbness and headaches. Psychiatric/Behavioral: Negative. Using a walker for ambulation.  :   /62 (Site: Right Upper Arm, Position: Sitting, Cuff Size: Medium Adult)   Pulse 88   Temp 97 °F (36.1 °C) (Skin)   Resp 16   Ht 5' (1.524 m)   Wt 135 lb 4 oz (61.3 kg)   LMP  (LMP Unknown)   BMI 26.41 kg/m²   Wt Readings from Last 3 Encounters:   08/27/21 135 lb 4 oz (61.3 kg)   08/27/21 135 lb 9.3 oz (61.5 kg)   08/03/21 135 lb 8 oz (61.5 kg)     Physical Exam  Vitals and nursing note reviewed. Constitutional:       General: She is not in acute distress. Appearance: She is well-developed. She is not diaphoretic. HENT:      Head: Normocephalic and atraumatic. Right Ear: Decreased hearing noted. Drainage present. Left Ear: Decreased hearing noted. Drainage present. Ears:      Comments: She has bilateral wax impacted  Eyes:      General: No scleral icterus. Right eye: No discharge. Left eye: No discharge. Conjunctiva/sclera: Conjunctivae normal.      Pupils: Pupils are equal, round, and reactive to light. Neck:      Thyroid: No thyromegaly. Vascular: No JVD. Cardiovascular:      Rate and Rhythm: Normal rate and regular rhythm. Heart sounds: Normal heart sounds. No murmur heard. Pulmonary:      Effort: No respiratory distress. Breath sounds: Normal breath sounds. No wheezing, rhonchi or rales. Abdominal:      General: Bowel sounds are normal. There is no distension. Palpations: Abdomen is soft. There is no mass. Tenderness: There is no abdominal tenderness.  There is no guarding or rebound. Musculoskeletal:         General: Normal range of motion. Cervical back: Normal range of motion and neck supple. Lymphadenopathy:      Cervical: No cervical adenopathy. Skin:     General: Skin is warm and dry. Findings: No rash. Neurological:      Mental Status: She is alert and oriented to person, place, and time. Psychiatric:         Behavior: Behavior normal.       :       Diagnosis Orders   1. Status post craniotomy     2. Decreased hearing of both ears     3. Bilateral impacted cerumen         :      Requested Prescriptions      No prescriptions requested or ordered in this encounter     Current Outpatient Medications   Medication Sig Dispense Refill    ondansetron (ZOFRAN-ODT) 4 MG disintegrating tablet Take 4 mg by mouth every 8 hours as needed      traZODone (DESYREL) 50 MG tablet take 1 tablet by mouth at bedtime if needed for sleep 30 tablet 5    ALPRAZolam (XANAX) 0.25 MG tablet Take 1 tablet by mouth 2 times daily as needed for Anxiety for up to 90 days.  30 tablet 2    midodrine (PROAMATINE) 2.5 MG tablet Take 1 tablet by mouth 3 times daily 90 tablet 3    levETIRAcetam (KEPPRA) 500 MG tablet Take 1 tablet by mouth 2 times daily 60 tablet 3    lidocaine 4 % external patch Apply 2 patches topically daily 3 box 1    docusate sodium (COLACE, DULCOLAX) 100 MG CAPS Take 100 mg by mouth 2 times daily 60 capsule 1    melatonin 3 MG TABS tablet Take 1 tablet by mouth nightly as needed (insomnia) 30 tablet 3    simvastatin (ZOCOR) 20 MG tablet take 1 tablet by mouth every evening 90 tablet 1    RA BIOTIN 1000 MCG TABS Take 1,000 mcg by mouth daily       loratadine (CLARITIN) 10 MG tablet take 1 tablet by mouth once daily 90 tablet 1    Nutritional Supplements (ENSURE ORIGINAL) LIQD Take 1 Can by mouth 2 times daily 60 Bottle 5    pantoprazole (PROTONIX) 40 MG tablet take 1 tablet by mouth once daily 90 tablet 1    sertraline (ZOLOFT) 50 MG tablet take 1 tablet by mouth twice a day 180 tablet 1    isosorbide mononitrate (IMDUR) 30 MG extended release tablet take 1 tablet by mouth once daily 90 tablet 1    Calcium Carbonate-Vitamin D (OYSTER SHELL CALCIUM/D) 500-200 MG-UNIT TABS take 1 tablet by mouth twice a day 180 tablet 3    levothyroxine (SYNTHROID) 100 MCG tablet take 1 tablet by mouth once daily 90 tablet 1    RA ASPIRIN EC ADULT LOW ST 81 MG EC tablet take 1 tablet by mouth once daily      vitamin D (ERGOCALCIFEROL) 1.25 MG (71628 UT) CAPS capsule take 1 capsule by mouth every week      promethazine (PHENERGAN) 25 MG tablet Take 1 tablet by mouth every 6 hours as needed for Nausea (and vomiting) 12 tablet 0    acetaminophen (TYLENOL) 500 MG tablet Take 1,000 mg by mouth every 8 hours as needed for Pain       Multiple Vitamins-Minerals (CENTRUM SILVER ADULT 50+) TABS Take 1 tablet by mouth daily 90 tablet 1     No current facility-administered medications for this visit. No orders of the defined types were placed in this encounter. Continue current medications. She may use Cerumenex or Debrox to her ears bilaterally daily for a week and come back next week for ear irrigation. Return in about 1 week (around 9/3/2021), or if symptoms worsen or fail to improve. Discussed use, benefit, and side effects of prescribed medications. All patient questions answered. Pt voiced understanding. Patient agreed with treatment plan.

## 2021-09-03 ENCOUNTER — NURSE ONLY (OUTPATIENT)
Dept: LAB | Age: 86
End: 2021-09-03

## 2021-09-03 ENCOUNTER — OFFICE VISIT (OUTPATIENT)
Dept: FAMILY MEDICINE CLINIC | Age: 86
End: 2021-09-03

## 2021-09-03 VITALS
TEMPERATURE: 96.8 F | RESPIRATION RATE: 16 BRPM | BODY MASS INDEX: 26.58 KG/M2 | SYSTOLIC BLOOD PRESSURE: 108 MMHG | HEART RATE: 88 BPM | DIASTOLIC BLOOD PRESSURE: 52 MMHG | WEIGHT: 135.38 LBS | HEIGHT: 60 IN

## 2021-09-03 DIAGNOSIS — Z87.39 HISTORY OF TEMPORAL ARTERITIS: ICD-10-CM

## 2021-09-03 DIAGNOSIS — H53.9 VISUAL DISTURBANCE: ICD-10-CM

## 2021-09-03 DIAGNOSIS — H61.23 BILATERAL IMPACTED CERUMEN: Primary | ICD-10-CM

## 2021-09-03 LAB
C-REACTIVE PROTEIN: 0.53 MG/DL (ref 0–1)
SEDIMENTATION RATE, ERYTHROCYTE: 18 MM/HR (ref 0–20)

## 2021-09-03 PROCEDURE — 1036F TOBACCO NON-USER: CPT | Performed by: FAMILY MEDICINE

## 2021-09-03 PROCEDURE — G8427 DOCREV CUR MEDS BY ELIG CLIN: HCPCS | Performed by: FAMILY MEDICINE

## 2021-09-03 PROCEDURE — 4040F PNEUMOC VAC/ADMIN/RCVD: CPT | Performed by: FAMILY MEDICINE

## 2021-09-03 PROCEDURE — 1090F PRES/ABSN URINE INCON ASSESS: CPT | Performed by: FAMILY MEDICINE

## 2021-09-03 PROCEDURE — 1123F ACP DISCUSS/DSCN MKR DOCD: CPT | Performed by: FAMILY MEDICINE

## 2021-09-03 PROCEDURE — G8417 CALC BMI ABV UP PARAM F/U: HCPCS | Performed by: FAMILY MEDICINE

## 2021-09-03 PROCEDURE — 99214 OFFICE O/P EST MOD 30 MIN: CPT | Performed by: FAMILY MEDICINE

## 2021-09-03 RX ORDER — PREDNISONE 20 MG/1
TABLET ORAL
Qty: 10 TABLET | Refills: 0 | Status: ON HOLD | OUTPATIENT
Start: 2021-09-03 | End: 2021-11-01 | Stop reason: HOSPADM

## 2021-09-03 ASSESSMENT — ENCOUNTER SYMPTOMS
DIARRHEA: 0
COUGH: 0
ABDOMINAL PAIN: 0
CONSTIPATION: 0
NAUSEA: 0
SHORTNESS OF BREATH: 0
CHEST TIGHTNESS: 0
VOMITING: 0

## 2021-09-03 NOTE — PROGRESS NOTES
Date: 9/3/2021  Here with Kaylin Dewitt her niece  Aimee Galaviz is a 80 y.o. female who presents today for:  Chief Complaint   Patient presents with    Check-Up     1 wk She is still having problems with her hearing, ears feel very plugged up,  Also she stated at times is having problems with her left eye which is the only eye she has as she had an artificial eye on the right and lost her vision due to temporal arteritis. She feels like her vision comes and goes and is concerned about that       HPI:     HPI    has a current medication list which includes the following prescription(s): prednisone, ondansetron, trazodone, alprazolam, midodrine, levetiracetam, lidocaine, docusate, melatonin, simvastatin, ra biotin, loratadine, ensure original, pantoprazole, sertraline, isosorbide mononitrate, oyster shell calcium/d, levothyroxine, ra aspirin ec adult low st, vitamin d, promethazine, acetaminophen, and centrum silver adult 50+.     Allergies   Allergen Reactions    Bactrim [Sulfamethoxazole-Trimethoprim] Swelling     Of tongue    Demerol Rash     nausea    Pcn [Penicillins] Rash       Social History     Tobacco Use    Smoking status: Former Smoker     Packs/day: 0.50     Types: Cigarettes    Smokeless tobacco: Never Used   Vaping Use    Vaping Use: Every day    Substances: Always   Substance Use Topics    Alcohol use: No    Drug use: No       Past Medical History:   Diagnosis Date    Anxiety     Blind right eye     Breast cancer metastasized to liver ([facility] [address] 05/10/2016    Liver lesion noted     Carotid stenosis      Left ICA 25%    Colostomy in [facility]) 05/27/2016    Degenerative arthritis of cervical spine 5/07    GERD (gastroesophageal reflux disease) 11/06    Hypercholesteremia     Hypoestrogenism     Hypothyroidism     Lumbago     Lumbosacral spinal stenosis 05/2019    MDRO (multiple drug resistant organisms) resistance 2008    pt stated cleared    Personal history of cardiac dysrhythmia     Sigmoid diverticulosis 8/04    Spondylolisthesis of lumbosacral region 8/04    Steroid-induced hyperglycemia     Subclavian artery stenosis (HCC)     left    Superior and Inferior Pubic ramus fracture, right, closed, initial encounter ([facility] [address] 05/21/2019    SVT (supraventricular tachycardia) ([facility] [address] 6/07    Temporal arteritis ([facility] [address]     Vertebral artery occlusion     left    Vitamin D deficiency 06/2017       Past Surgical History:   Procedure Laterality Date    CARDIAC CATHETERIZATION  5/07    CATARACT REMOVAL  right 1/08; left 2/08    CHOLECYSTECTOMY  1/03    COLONOSCOPY  11/06    COLOSTOMY  09/04/2018    REVERSAL OF COLOSTOMY    CRANIOTOMY Right 7/2/2021    CRANIOTOMY FOR RESECTION/DEBULKING OF RIGHT SIDE INTRA BRAIN TUMOR performed by Елена Grewal MD at [address]      ENDOSCOPY, COLON, DIAGNOSTIC      EYE REMOVAL Right 03/2017    EYE SURGERY      EYE SURGERY Right 11/06/2017    Dr Mony Griffin in [address]      partial    OTHER SURGICAL HISTORY  05/27/2016    robotic assisted laparoscopic anterior colon resection and end colostomy    DE OFFICE/OUTPT VISIT,PROCEDURE ONLY N/A 9/4/2018    COLOSTOMY REVERSAL AND PARASTOMAL HERNIA REPAIR performed by Alix Meyers MD at [address] [facility]  2002    UPPER GASTROINTESTINAL ENDOSCOPY  11/06       Family History   Problem Relation Age of Onset    Cancer Sister 61        breast    Cancer Brother 79        lung    Cancer Brother 68        colon    Cancer Son 48        lung     Subjective:     Review of Systems   Constitutional: Negative for activity change, appetite change, diaphoresis, fatigue and fever. HENT: Positive for hearing loss. Eyes: Positive for visual disturbance. Respiratory: Negative for cough, chest tightness and shortness of breath. Cardiovascular: Negative for chest pain, palpitations and leg swelling.    Gastrointestinal: Negative for abdominal pain, constipation, diarrhea, nausea and vomiting. Genitourinary: Negative. Musculoskeletal: Negative. Skin: Negative. Negative for rash. Neurological: Negative for dizziness, syncope, weakness, light-headedness, numbness and headaches. Psychiatric/Behavioral: Negative.        :   BP (!) 108/52 (Site: Right Upper Arm, Position: Sitting, Cuff Size: Medium Adult)   Pulse 88   Temp 96.8 °F (36 °C) (Skin)   Resp 16   Ht 5' (1.524 m)   Wt 135 lb 6 oz (61.4 kg)   LMP  (LMP Unknown)   BMI 26.44 kg/m²   Wt Readings from Last 3 Encounters:   09/03/21 135 lb 6 oz (61.4 kg)   08/27/21 135 lb 4 oz (61.3 kg)   08/27/21 135 lb 9.3 oz (61.5 kg)     Physical Exam  Vitals and nursing note reviewed. Constitutional:       General: She is not in acute distress. Appearance: She is well-developed. She is not diaphoretic. HENT:      Head: Normocephalic and atraumatic. Ears:      Comments: Ears were totally plugged up with wax in the ear canal and after irrigation they looked well there was no evidence of any infection. He stated that she was able to hear better she was able to hear finger rubs bilaterally. Eyes:      General: No scleral icterus. Right eye: No discharge. Left eye: No discharge. Conjunctiva/sclera: Conjunctivae normal.      Pupils: Pupils are equal, round, and reactive to light. Neck:      Thyroid: No thyromegaly. Vascular: No JVD. Cardiovascular:      Rate and Rhythm: Normal rate and regular rhythm. Heart sounds: Normal heart sounds. No murmur heard. Pulmonary:      Effort: No respiratory distress. Breath sounds: Normal breath sounds. No wheezing, rhonchi or rales. Abdominal:      General: Bowel sounds are normal. There is no distension. Palpations: Abdomen is soft. There is no mass. Tenderness: There is no abdominal tenderness. There is no guarding or rebound. Musculoskeletal:         General: Normal range of motion.       Cervical back: Normal range of motion and neck supple. Lymphadenopathy:      Cervical: No cervical adenopathy. Skin:     General: Skin is warm and dry. Findings: No rash. Neurological:      Mental Status: She is alert and oriented to person, place, and time. Psychiatric:         Behavior: Behavior normal.       :       Diagnosis Orders   1. Bilateral impacted cerumen     2. Visual disturbance  CLAUDIA Royal MD, Opthalmology, [place]    Sedimentation Rate    C-Reactive Protein   3. History of temporal arteritis  CLAUDIA Royal MD, Opthalmology, [facility]    Sedimentation Rate    C-Reactive Protein       :      Requested Prescriptions     Signed Prescriptions Disp Refills    predniSONE (DELTASONE) 20 MG tablet 10 tablet 0     Sig: Take 30 mg orally twice a day     Current Outpatient Medications   Medication Sig Dispense Refill    predniSONE (DELTASONE) 20 MG tablet Take 30 mg orally twice a day 10 tablet 0    ondansetron (ZOFRAN-ODT) 4 MG disintegrating tablet Take 4 mg by mouth every 8 hours as needed      traZODone (DESYREL) 50 MG tablet take 1 tablet by mouth at bedtime if needed for sleep 30 tablet 5    ALPRAZolam (XANAX) 0.25 MG tablet Take 1 tablet by mouth 2 times daily as needed for Anxiety for up to 90 days.  30 tablet 2    midodrine (PROAMATINE) 2.5 MG tablet Take 1 tablet by mouth 3 times daily 90 tablet 3    levETIRAcetam (KEPPRA) 500 MG tablet Take 1 tablet by mouth 2 times daily 60 tablet 3    lidocaine 4 % external patch Apply 2 patches topically daily 3 box 1    docusate sodium (COLACE, DULCOLAX) 100 MG CAPS Take 100 mg by mouth 2 times daily 60 capsule 1    melatonin 3 MG TABS tablet Take 1 tablet by mouth nightly as needed (insomnia) 30 tablet 3    simvastatin (ZOCOR) 20 MG tablet take 1 tablet by mouth every evening 90 tablet 1    RA BIOTIN 1000 MCG TABS Take 1,000 mcg by mouth daily       loratadine (CLARITIN) 10 MG tablet take 1 tablet by mouth once daily 90 tablet 1    Nutritional Supplements (ENSURE ORIGINAL) LIQD Take 1 Can by mouth 2 times daily 60 Bottle 5    pantoprazole (PROTONIX) 40 MG tablet take 1 tablet by mouth once daily 90 tablet 1    sertraline (ZOLOFT) 50 MG tablet take 1 tablet by mouth twice a day 180 tablet 1    isosorbide mononitrate (IMDUR) 30 MG extended release tablet take 1 tablet by mouth once daily 90 tablet 1    Calcium Carbonate-Vitamin D (OYSTER SHELL CALCIUM/D) 500-200 MG-UNIT TABS take 1 tablet by mouth twice a day 180 tablet 3    levothyroxine (SYNTHROID) 100 MCG tablet take 1 tablet by mouth once daily 90 tablet 1    RA ASPIRIN EC ADULT LOW ST 81 MG EC tablet take 1 tablet by mouth once daily      vitamin D (ERGOCALCIFEROL) 1.25 MG (67997 UT) CAPS capsule take 1 capsule by mouth every week      promethazine (PHENERGAN) 25 MG tablet Take 1 tablet by mouth every 6 hours as needed for Nausea (and vomiting) 12 tablet 0    acetaminophen (TYLENOL) 500 MG tablet Take 1,000 mg by mouth every 8 hours as needed for Pain       Multiple Vitamins-Minerals (CENTRUM SILVER ADULT 50+) TABS Take 1 tablet by mouth daily 90 tablet 1     No current facility-administered medications for this visit. Orders Placed This Encounter   Procedures    Sedimentation Rate     Standing Status:   Future     Standing Expiration Date:   9/3/2022    C-Reactive Protein     Standing Status:   Future     Standing Expiration Date:   9/3/2022    [facility] - Klaudia Rubio MD, Opthalmology, [facility]     Referral Priority:   Routine     Referral Type:   Eval and Treat     Referral Reason:   Specialty Services Required     Referred to Provider:   Maggie Dee MD     Requested Specialty:   Ophthalmology     Number of Visits Requested:   1     Spoke to Dr. Bakari Eastman patient's oncologist to see if there is any reason why she should not be on prednisone but he stated that she he did not have any therefore we will resume her prednisone.   Spoke to Dr. David Hendrickson and explained the situation and he kindly agreed on seeing the patient today. Patient was started to his office trach from our office. Continue current medications. Return in about 1 week (around 9/10/2021). Discussed use, benefit, and side effects of prescribed medications. All patient questions answered. Pt voiced understanding. .  Patient agreed with treatment plan.

## 2021-09-07 ENCOUNTER — TELEPHONE (OUTPATIENT)
Dept: FAMILY MEDICINE CLINIC | Age: 86
End: 2021-09-07

## 2021-09-07 NOTE — TELEPHONE ENCOUNTER
----- Message from Jus Mauro MD sent at 9/7/2021  9:21 AM EDT -----  Please make sure she has a follow up appt this week

## 2021-09-08 NOTE — TELEPHONE ENCOUNTER
Dr Gerald Barreto re started the Prednisone 3mg  - 1mg tid to maintain. Alexsandranaty Parminder stated that she is too exhausted with all the appts to come in this week. They are going to DC the Prednisone that Dr Reny Bae called in over the weekend. Decided not to schedule an appt with Dr Reny Bae at this time due to so many other appts.

## 2021-09-08 NOTE — TELEPHONE ENCOUNTER
Spoke with Karyna Pearson and she did not want to schedule anything this week because she has too many appointments already. She is checking into next week and will call back.

## 2021-09-08 NOTE — TELEPHONE ENCOUNTER
Ok thanks I just wanted to make sure a physician is following up on her Prednisone. It is ok to not have appt with us since Dr Ricardo Spicer is following up on her Prednisone.   Thanks

## 2021-09-14 RX ORDER — CARBOXYMETHYLCELLULOSE SODIUM 5 MG/ML
SOLUTION/ DROPS OPHTHALMIC
COMMUNITY
Start: 2021-09-09

## 2021-09-21 ENCOUNTER — HOSPITAL ENCOUNTER (OUTPATIENT)
Dept: INTERVENTIONAL RADIOLOGY/VASCULAR | Age: 86
Discharge: HOME | End: 2021-09-21
Payer: MEDICARE

## 2021-09-21 DIAGNOSIS — R22.42 MASS OF LOWER LEG, LEFT: ICD-10-CM

## 2021-09-21 PROCEDURE — 93971 EXTREMITY STUDY: CPT

## 2021-09-28 ENCOUNTER — NURSE ONLY (OUTPATIENT)
Dept: LAB | Age: 86
End: 2021-09-28

## 2021-09-28 LAB
CALCIUM SERPL-MCNC: 9.6 MG/DL (ref 8.5–10.5)
CREAT SERPL-MCNC: 0.8 MG/DL (ref 0.4–1.2)
GFR SERPL CREATININE-BSD FRML MDRD: 68 ML/MIN/1.73M2
SEDIMENTATION RATE, ERYTHROCYTE: 1 MM/HR (ref 0–20)
VITAMIN D 25-HYDROXY: 41 NG/ML (ref 30–100)

## 2021-10-05 DIAGNOSIS — C50.919 CARCINOMA OF BREAST METASTATIC TO LIVER, UNSPECIFIED LATERALITY (HCC): ICD-10-CM

## 2021-10-05 DIAGNOSIS — C78.7 CARCINOMA OF BREAST METASTATIC TO LIVER, UNSPECIFIED LATERALITY (HCC): ICD-10-CM

## 2021-10-05 DIAGNOSIS — C50.911 BREAST CANCER METASTASIZED TO BRAIN, RIGHT (HCC): ICD-10-CM

## 2021-10-05 DIAGNOSIS — C79.31 BREAST CANCER METASTASIZED TO BRAIN, RIGHT (HCC): ICD-10-CM

## 2021-10-05 DIAGNOSIS — F41.9 ANXIETY: ICD-10-CM

## 2021-10-05 RX ORDER — LEVOTHYROXINE SODIUM 0.1 MG/1
TABLET ORAL
Qty: 90 TABLET | Refills: 1 | Status: SHIPPED | OUTPATIENT
Start: 2021-10-05 | End: 2022-05-04

## 2021-10-05 RX ORDER — ALPRAZOLAM 0.25 MG/1
0.25 TABLET ORAL 2 TIMES DAILY PRN
Qty: 30 TABLET | Refills: 2 | Status: SHIPPED | OUTPATIENT
Start: 2021-10-05 | End: 2021-10-12 | Stop reason: SDUPTHER

## 2021-10-21 ENCOUNTER — HOSPITAL ENCOUNTER (OUTPATIENT)
Dept: MRI IMAGING | Age: 86
Discharge: HOME | End: 2021-10-21
Payer: MEDICARE

## 2021-10-21 DIAGNOSIS — I61.9 INTRAPARENCHYMAL HEMORRHAGE OF BRAIN (HCC): ICD-10-CM

## 2021-10-21 PROCEDURE — A9579 GAD-BASE MR CONTRAST NOS,1ML: HCPCS | Performed by: PHYSICIAN ASSISTANT

## 2021-10-21 PROCEDURE — 70553 MRI BRAIN STEM W/O & W/DYE: CPT

## 2021-10-21 PROCEDURE — 6360000004 HC RX CONTRAST MEDICATION: Performed by: PHYSICIAN ASSISTANT

## 2021-10-21 RX ADMIN — GADOTERIDOL 10 ML: 279.3 INJECTION, SOLUTION INTRAVENOUS at 14:27

## 2021-10-29 ENCOUNTER — HOSPITAL ENCOUNTER (INPATIENT)
Age: 86
LOS: 3 days | Discharge: HOME HEALTH | DRG: 690 | End: 2021-11-01
Attending: EMERGENCY MEDICINE | Admitting: EMERGENCY MEDICINE
Payer: MEDICARE

## 2021-10-29 ENCOUNTER — APPOINTMENT (OUTPATIENT)
Dept: CT IMAGING | Age: 86
DRG: 690 | End: 2021-10-29
Payer: MEDICARE

## 2021-10-29 DIAGNOSIS — R41.82 ALTERED MENTAL STATUS, UNSPECIFIED ALTERED MENTAL STATUS TYPE: Primary | ICD-10-CM

## 2021-10-29 PROBLEM — R53.1 GENERALIZED WEAKNESS: Status: ACTIVE | Noted: 2021-10-29

## 2021-10-29 PROBLEM — Z98.890 HISTORY OF CRANIOTOMY: Status: ACTIVE | Noted: 2021-07-01

## 2021-10-29 PROBLEM — S06.6X0A SUBARACHNOID HEMORRHAGE FOLLOWING INJURY, NO LOSS OF CONSCIOUSNESS (HCC): Status: RESOLVED | Noted: 2021-06-28 | Resolved: 2021-10-29

## 2021-10-29 LAB
ANION GAP SERPL CALCULATED.3IONS-SCNC: 12 MEQ/L (ref 8–16)
BACTERIA: ABNORMAL
BASOPHILS # BLD: 0.3 %
BASOPHILS ABSOLUTE: 0 THOU/MM3 (ref 0–0.1)
BILIRUBIN URINE: NEGATIVE
BLOOD, URINE: NEGATIVE
BUN BLDV-MCNC: 15 MG/DL (ref 7–22)
CALCIUM SERPL-MCNC: 9 MG/DL (ref 8.5–10.5)
CASTS: ABNORMAL /LPF
CASTS: ABNORMAL /LPF
CHARACTER, URINE: CLEAR
CHLORIDE BLD-SCNC: 98 MEQ/L (ref 98–111)
CO2: 26 MEQ/L (ref 23–33)
COLOR: YELLOW
CREAT SERPL-MCNC: 0.9 MG/DL (ref 0.4–1.2)
CRYSTALS: ABNORMAL
EKG ATRIAL RATE: 88 BPM
EKG P-R INTERVAL: 140 MS
EKG Q-T INTERVAL: 378 MS
EKG QRS DURATION: 84 MS
EKG QTC CALCULATION (BAZETT): 457 MS
EKG R AXIS: 4 DEGREES
EKG T AXIS: 171 DEGREES
EKG VENTRICULAR RATE: 88 BPM
EOSINOPHIL # BLD: 0.3 %
EOSINOPHILS ABSOLUTE: 0 THOU/MM3 (ref 0–0.4)
EPITHELIAL CELLS, UA: ABNORMAL /HPF
ERYTHROCYTE [DISTWIDTH] IN BLOOD BY AUTOMATED COUNT: 16.5 % (ref 11.5–14.5)
ERYTHROCYTE [DISTWIDTH] IN BLOOD BY AUTOMATED COUNT: 56.3 FL (ref 35–45)
GFR SERPL CREATININE-BSD FRML MDRD: 59 ML/MIN/1.73M2
GLUCOSE BLD-MCNC: 102 MG/DL (ref 70–108)
GLUCOSE, URINE: NEGATIVE MG/DL
HCT VFR BLD CALC: 44.9 % (ref 37–47)
HEMOGLOBIN: 14.1 GM/DL (ref 12–16)
IMMATURE GRANS (ABS): 0.01 THOU/MM3 (ref 0–0.07)
IMMATURE GRANULOCYTES: 0.3 %
KETONES, URINE: NEGATIVE
LACTIC ACID: 1.1 MMOL/L (ref 0.5–2)
LEUKOCYTE ESTERASE, URINE: ABNORMAL
LYMPHOCYTES # BLD: 6.2 %
LYMPHOCYTES ABSOLUTE: 0.2 THOU/MM3 (ref 1–4.8)
MAGNESIUM: 1.8 MG/DL (ref 1.6–2.4)
MCH RBC QN AUTO: 29 PG (ref 26–33)
MCHC RBC AUTO-ENTMCNC: 31.4 GM/DL (ref 32.2–35.5)
MCV RBC AUTO: 92.2 FL (ref 81–99)
MISCELLANEOUS LAB TEST RESULT: ABNORMAL
MONOCYTES # BLD: 5.6 %
MONOCYTES ABSOLUTE: 0.2 THOU/MM3 (ref 0.4–1.3)
NITRITE, URINE: POSITIVE
NUCLEATED RED BLOOD CELLS: 0 /100 WBC
OSMOLALITY CALCULATION: 273 MOSMOL/KG (ref 275–300)
PH UA: 8.5 (ref 5–9)
PLATELET # BLD: 136 THOU/MM3 (ref 130–400)
PMV BLD AUTO: 12 FL (ref 9.4–12.4)
POTASSIUM SERPL-SCNC: 4.2 MEQ/L (ref 3.5–5.2)
PROTEIN UA: ABNORMAL MG/DL
RBC # BLD: 4.87 MILL/MM3 (ref 4.2–5.4)
RBC URINE: ABNORMAL /HPF
RENAL EPITHELIAL, UA: ABNORMAL
SARS-COV-2, NAAT: NOT  DETECTED
SEG NEUTROPHILS: 87.3 %
SEGMENTED NEUTROPHILS ABSOLUTE COUNT: 3 THOU/MM3 (ref 1.8–7.7)
SODIUM BLD-SCNC: 136 MEQ/L (ref 135–145)
SPECIFIC GRAVITY UA: 1.01 (ref 1–1.03)
TSH SERPL DL<=0.05 MIU/L-ACNC: 7.69 UIU/ML (ref 0.4–4.2)
UROBILINOGEN, URINE: 0.2 EU/DL (ref 0–1)
WBC # BLD: 3.4 THOU/MM3 (ref 4.8–10.8)
WBC UA: ABNORMAL /HPF
YEAST: ABNORMAL

## 2021-10-29 PROCEDURE — 70450 CT HEAD/BRAIN W/O DYE: CPT

## 2021-10-29 PROCEDURE — 87635 SARS-COV-2 COVID-19 AMP PRB: CPT

## 2021-10-29 PROCEDURE — 83605 ASSAY OF LACTIC ACID: CPT

## 2021-10-29 PROCEDURE — 6360000002 HC RX W HCPCS: Performed by: EMERGENCY MEDICINE

## 2021-10-29 PROCEDURE — 87040 BLOOD CULTURE FOR BACTERIA: CPT

## 2021-10-29 PROCEDURE — 85025 COMPLETE CBC W/AUTO DIFF WBC: CPT

## 2021-10-29 PROCEDURE — 1200000000 HC SEMI PRIVATE

## 2021-10-29 PROCEDURE — 93005 ELECTROCARDIOGRAM TRACING: CPT | Performed by: NURSE PRACTITIONER

## 2021-10-29 PROCEDURE — 84443 ASSAY THYROID STIM HORMONE: CPT

## 2021-10-29 PROCEDURE — 2580000003 HC RX 258: Performed by: EMERGENCY MEDICINE

## 2021-10-29 PROCEDURE — 6370000000 HC RX 637 (ALT 250 FOR IP): Performed by: EMERGENCY MEDICINE

## 2021-10-29 PROCEDURE — 81001 URINALYSIS AUTO W/SCOPE: CPT

## 2021-10-29 PROCEDURE — 83735 ASSAY OF MAGNESIUM: CPT

## 2021-10-29 PROCEDURE — 36415 COLL VENOUS BLD VENIPUNCTURE: CPT

## 2021-10-29 PROCEDURE — 80048 BASIC METABOLIC PNL TOTAL CA: CPT

## 2021-10-29 PROCEDURE — 99284 EMERGENCY DEPT VISIT MOD MDM: CPT

## 2021-10-29 PROCEDURE — 87801 DETECT AGNT MULT DNA AMPLI: CPT

## 2021-10-29 PROCEDURE — 87147 CULTURE TYPE IMMUNOLOGIC: CPT

## 2021-10-29 RX ORDER — ALPRAZOLAM 0.5 MG/1
0.25 TABLET ORAL 2 TIMES DAILY PRN
Status: DISCONTINUED | OUTPATIENT
Start: 2021-10-29 | End: 2021-11-01 | Stop reason: HOSPADM

## 2021-10-29 RX ORDER — SODIUM CHLORIDE 9 MG/ML
25 INJECTION, SOLUTION INTRAVENOUS PRN
Status: DISCONTINUED | OUTPATIENT
Start: 2021-10-29 | End: 2021-11-01 | Stop reason: HOSPADM

## 2021-10-29 RX ORDER — CETIRIZINE HYDROCHLORIDE 10 MG/1
10 TABLET ORAL DAILY
Status: DISCONTINUED | OUTPATIENT
Start: 2021-10-29 | End: 2021-11-01 | Stop reason: HOSPADM

## 2021-10-29 RX ORDER — BIOTIN 1 MG
1000 TABLET ORAL DAILY
Status: DISCONTINUED | OUTPATIENT
Start: 2021-10-29 | End: 2021-10-29 | Stop reason: RX

## 2021-10-29 RX ORDER — TRAZODONE HYDROCHLORIDE 50 MG/1
50 TABLET ORAL NIGHTLY PRN
Status: DISCONTINUED | OUTPATIENT
Start: 2021-10-29 | End: 2021-11-01 | Stop reason: HOSPADM

## 2021-10-29 RX ORDER — ATORVASTATIN CALCIUM 20 MG/1
20 TABLET, FILM COATED ORAL DAILY
Status: DISCONTINUED | OUTPATIENT
Start: 2021-10-29 | End: 2021-11-01 | Stop reason: HOSPADM

## 2021-10-29 RX ORDER — ONDANSETRON 4 MG/1
4 TABLET, ORALLY DISINTEGRATING ORAL EVERY 8 HOURS PRN
Status: DISCONTINUED | OUTPATIENT
Start: 2021-10-29 | End: 2021-11-01 | Stop reason: HOSPADM

## 2021-10-29 RX ORDER — M-VIT,TX,IRON,MINS/CALC/FOLIC 27MG-0.4MG
1 TABLET ORAL DAILY
Status: DISCONTINUED | OUTPATIENT
Start: 2021-10-29 | End: 2021-11-01 | Stop reason: HOSPADM

## 2021-10-29 RX ORDER — LEVETIRACETAM 500 MG/1
500 TABLET ORAL 2 TIMES DAILY
Status: DISCONTINUED | OUTPATIENT
Start: 2021-10-29 | End: 2021-11-01 | Stop reason: HOSPADM

## 2021-10-29 RX ORDER — ACETAMINOPHEN 650 MG/1
650 SUPPOSITORY RECTAL EVERY 6 HOURS PRN
Status: DISCONTINUED | OUTPATIENT
Start: 2021-10-29 | End: 2021-11-01 | Stop reason: HOSPADM

## 2021-10-29 RX ORDER — SODIUM CHLORIDE 0.9 % (FLUSH) 0.9 %
5-40 SYRINGE (ML) INJECTION EVERY 12 HOURS SCHEDULED
Status: DISCONTINUED | OUTPATIENT
Start: 2021-10-29 | End: 2021-11-01 | Stop reason: HOSPADM

## 2021-10-29 RX ORDER — ACETAMINOPHEN 325 MG/1
650 TABLET ORAL EVERY 6 HOURS PRN
Status: DISCONTINUED | OUTPATIENT
Start: 2021-10-29 | End: 2021-11-01 | Stop reason: HOSPADM

## 2021-10-29 RX ORDER — LEVOTHYROXINE SODIUM 0.1 MG/1
100 TABLET ORAL DAILY
Status: DISCONTINUED | OUTPATIENT
Start: 2021-10-29 | End: 2021-11-01 | Stop reason: HOSPADM

## 2021-10-29 RX ORDER — ACETAMINOPHEN 500 MG
1000 TABLET ORAL EVERY 8 HOURS PRN
Status: DISCONTINUED | OUTPATIENT
Start: 2021-10-29 | End: 2021-10-29 | Stop reason: SDUPTHER

## 2021-10-29 RX ORDER — LANOLIN ALCOHOL/MO/W.PET/CERES
3 CREAM (GRAM) TOPICAL NIGHTLY PRN
Status: DISCONTINUED | OUTPATIENT
Start: 2021-10-29 | End: 2021-11-01 | Stop reason: HOSPADM

## 2021-10-29 RX ORDER — SODIUM CHLORIDE 9 MG/ML
INJECTION, SOLUTION INTRAVENOUS CONTINUOUS
Status: DISCONTINUED | OUTPATIENT
Start: 2021-10-29 | End: 2021-11-01 | Stop reason: HOSPADM

## 2021-10-29 RX ORDER — CARBOXYMETHYLCELLULOSE SODIUM 5 MG/ML
2 SOLUTION/ DROPS OPHTHALMIC 2 TIMES DAILY
Status: DISCONTINUED | OUTPATIENT
Start: 2021-10-29 | End: 2021-11-01 | Stop reason: HOSPADM

## 2021-10-29 RX ORDER — ONDANSETRON 2 MG/ML
4 INJECTION INTRAMUSCULAR; INTRAVENOUS EVERY 6 HOURS PRN
Status: DISCONTINUED | OUTPATIENT
Start: 2021-10-29 | End: 2021-11-01 | Stop reason: HOSPADM

## 2021-10-29 RX ORDER — POLYETHYLENE GLYCOL 3350 17 G/17G
17 POWDER, FOR SOLUTION ORAL DAILY PRN
Status: DISCONTINUED | OUTPATIENT
Start: 2021-10-29 | End: 2021-10-30

## 2021-10-29 RX ORDER — CALORIC SUPPLEMENT
1 LIQUID (ML) ORAL 2 TIMES DAILY
Status: DISCONTINUED | OUTPATIENT
Start: 2021-10-29 | End: 2021-10-29 | Stop reason: RX

## 2021-10-29 RX ORDER — SODIUM CHLORIDE 0.9 % (FLUSH) 0.9 %
5-40 SYRINGE (ML) INJECTION PRN
Status: DISCONTINUED | OUTPATIENT
Start: 2021-10-29 | End: 2021-11-01 | Stop reason: HOSPADM

## 2021-10-29 RX ORDER — PANTOPRAZOLE SODIUM 40 MG/1
40 TABLET, DELAYED RELEASE ORAL DAILY
Status: DISCONTINUED | OUTPATIENT
Start: 2021-10-29 | End: 2021-11-01 | Stop reason: HOSPADM

## 2021-10-29 RX ORDER — ISOSORBIDE MONONITRATE 30 MG/1
30 TABLET, EXTENDED RELEASE ORAL DAILY
Status: DISCONTINUED | OUTPATIENT
Start: 2021-10-29 | End: 2021-11-01 | Stop reason: HOSPADM

## 2021-10-29 RX ORDER — ASPIRIN 81 MG/1
81 TABLET ORAL DAILY
Status: DISCONTINUED | OUTPATIENT
Start: 2021-10-29 | End: 2021-11-01 | Stop reason: HOSPADM

## 2021-10-29 RX ADMIN — CEFTRIAXONE SODIUM 1000 MG: 1 INJECTION, POWDER, FOR SOLUTION INTRAMUSCULAR; INTRAVENOUS at 20:10

## 2021-10-29 RX ADMIN — CEFTRIAXONE SODIUM 1000 MG: 1 INJECTION, POWDER, FOR SOLUTION INTRAMUSCULAR; INTRAVENOUS at 13:51

## 2021-10-29 RX ADMIN — LEVETIRACETAM 500 MG: 500 TABLET, FILM COATED ORAL at 20:10

## 2021-10-29 RX ADMIN — SODIUM CHLORIDE: 9 INJECTION, SOLUTION INTRAVENOUS at 19:30

## 2021-10-29 RX ADMIN — ENOXAPARIN SODIUM 40 MG: 40 INJECTION SUBCUTANEOUS at 20:10

## 2021-10-29 NOTE — ED NOTES
Patient is resting in bed with easy and unlabored respirations. Call light in reach. Side rails up x2. Patient denies further complaints or concerns. Will monitor.         Tere Wisdom RN  10/29/21 [id number]

## 2021-10-29 NOTE — ED NOTES
Called 5k to notify that pt would be up soon. Pt transported by bed, stable.       Lala Tariq  10/29/21 [id number]

## 2021-10-29 NOTE — ED PROVIDER NOTES
[facility]    CHIEF COMPLAINT       Chief Complaint   Patient presents with    Fatigue       Nurses Notes reviewed and I agree except as noted in the HPI. HISTORY OF PRESENT ILLNESS    Jeremy Rehman is a 80 y.o. female who presents to the ED for evaluation of fatigue. Patient here today with generalized weakness. Notes that her aide came to see her this morning and was unable to get into her house because the patient was not able to get up on her own. On 911. Patient unable to provide any significant history other than that she feels weak. Per electronic medical record she has a history of temporal arteritis, loss of the right eye, has a prosthetic eye. Breast cancer, with metastasis to liver. Mild carotid stenosis, vertebral artery stenosis. She had a brain mass apparently resected in August, following with Dr. Ellena Kawasaki, they ordered a repeat MRI that was completed within the last week. HPI was provided by the patient.     REVIEW OF SYSTEMS     Review of Systems   Unable to perform ROS: Mental status change        PAST MEDICAL HISTORY     Past Medical History:   Diagnosis Date    Anxiety     Blind right eye     Breast cancer metastasized to liver (5/16) and brain (7/21) ([facility] 05/10/2016    Liver lesion noted 5/16 : Brain 7/21    Carotid stenosis      Left ICA 25%    Colostomy in place ([facility] [facility] 05/27/2016    Degenerative arthritis of cervical spine 05/2007    GERD (gastroesophageal reflux disease) 11/2006    History of craniotomy 07/2021    mets from breast CA    Hypercholesteremia     Hypoestrogenism     Hypothyroidism     Lumbago     Lumbosacral spinal stenosis 05/2019    MDRO (multiple drug resistant organisms) resistance 2008    pt stated cleared    Personal history of cardiac dysrhythmia     Sigmoid diverticulosis 08/2004    Spondylolisthesis of lumbosacral region 08/2004    Steroid-induced hyperglycemia     Subclavian artery stenosis (HCC)     left    Superior and Inferior Pubic ramus fracture, right, closed, initial encounter ([facility] 05/21/2019    SVT (supraventricular tachycardia) ([facility] 06/2007    Temporal arteritis (HCC)     Vertebral artery occlusion     left    Vitamin D deficiency 06/2017       SURGICALHISTORY      has a past surgical history that includes Dilation and curettage of uterus; Cholecystectomy (1/03); Cardiac catheterization (5/07); Cataract removal (right 1/08; left 2/08); subclavian / pulmonary shunt (2002); Colonoscopy (11/06); Upper gastrointestinal endoscopy (11/06); Hysterectomy; eye surgery; other surgical history (05/27/2016); Eye surgery (Right, 11/06/2017); pr office/outpt visit,procedure only (N/A, 9/4/2018); colostomy (09/04/2018); Endoscopy, colon, diagnostic; Eye removal (Right, 03/2017); and craniotomy (Right, 7/2/2021). CURRENT MEDICATIONS       Current Discharge Medication List      CONTINUE these medications which have NOT CHANGED    Details   ALPRAZolam (XANAX) 0.25 MG tablet Take 1 tablet by mouth 2 times daily as needed for Anxiety for up to 90 days. Qty: 30 tablet, Refills: 2    Associated Diagnoses: Breast cancer metastasized to brain, right ([facility] Carcinoma of breast metastatic to liver, unspecified laterality ([facility]  Anxiety      levothyroxine (SYNTHROID) 100 MCG tablet 1 tablet daily  Qty: 90 tablet, Refills: 1      REFRESH PLUS 0.5 % SOLN ophthalmic solution use as directed      predniSONE (DELTASONE) 20 MG tablet Take 30 mg orally twice a day  Qty: 10 tablet, Refills: 0      ondansetron (ZOFRAN-ODT) 4 MG disintegrating tablet Take 4 mg by mouth every 8 hours as needed      traZODone (DESYREL) 50 MG tablet take 1 tablet by mouth at bedtime if needed for sleep  Qty: 30 tablet, Refills: 5      midodrine (PROAMATINE) 2.5 MG tablet Take 1 tablet by mouth 3 times daily  Qty: 90 tablet, Refills: 3      levETIRAcetam (KEPPRA) 500 MG tablet Take 1 tablet by mouth 2 times daily  Qty: 60 tablet, Refills: 3      lidocaine 4 % external patch Apply 2 patches topically daily  Qty: 3 box, Refills: 1      docusate sodium (COLACE, DULCOLAX) 100 MG CAPS Take 100 mg by mouth 2 times daily  Qty: 60 capsule, Refills: 1      melatonin 3 MG TABS tablet Take 1 tablet by mouth nightly as needed (insomnia)  Qty: 30 tablet, Refills: 3      simvastatin (ZOCOR) 20 MG tablet take 1 tablet by mouth every evening  Qty: 90 tablet, Refills: 1      RA BIOTIN 1000 MCG TABS Take 1,000 mcg by mouth daily       loratadine (CLARITIN) 10 MG tablet take 1 tablet by mouth once daily  Qty: 90 tablet, Refills: 1      Nutritional Supplements (ENSURE ORIGINAL) LIQD Take 1 Can by mouth 2 times daily  Qty: 60 Bottle, Refills: 5    Associated Diagnoses: Anorexia; History of malignant neoplasm metastatic to liver      pantoprazole (PROTONIX) 40 MG tablet take 1 tablet by mouth once daily  Qty: 90 tablet, Refills: 1      sertraline (ZOLOFT) 50 MG tablet take 1 tablet by mouth twice a day  Qty: 180 tablet, Refills: 1      isosorbide mononitrate (IMDUR) 30 MG extended release tablet take 1 tablet by mouth once daily  Qty: 90 tablet, Refills: 1      Calcium Carbonate-Vitamin D (OYSTER SHELL CALCIUM/D) 500-200 MG-UNIT TABS take 1 tablet by mouth twice a day  Qty: 180 tablet, Refills: 3      RA ASPIRIN EC ADULT LOW ST 81 MG EC tablet take 1 tablet by mouth once daily      vitamin D (ERGOCALCIFEROL) 1.25 MG (69906 UT) CAPS capsule take 1 capsule by mouth every week      promethazine (PHENERGAN) 25 MG tablet Take 1 tablet by mouth every 6 hours as needed for Nausea (and vomiting)  Qty: 12 tablet, Refills: 0      acetaminophen (TYLENOL) 500 MG tablet Take 1,000 mg by mouth every 8 hours as needed for Pain       Multiple Vitamins-Minerals (CENTRUM SILVER ADULT 50+) TABS Take 1 tablet by mouth daily  Qty: 90 tablet, Refills: 1             ALLERGIES     is allergic to bactrim [sulfamethoxazole-trimethoprim], demerol, and pcn [penicillins].     FAMILY HISTORY     She indicated that her mother is . She indicated that her father is . She indicated that the status of her sister is unknown. She indicated that the status of her son is unknown.   family history includes Cancer (age of onset: 48) in her son; Cancer (age of onset: 61) in her sister; Cancer (age of onset: 79) in her brother; Cancer (age of onset: 68) in her brother. SOCIAL HISTORY       Social History     Socioeconomic History    Marital status:      Spouse name: Not on file    Number of children: 0    Years of education: 10th grade     Highest education level: Not on file   Occupational History    Not on file   Tobacco Use    Smoking status: Former Smoker     Packs/day: 0.50     Types: Cigarettes    Smokeless tobacco: Never Used   Vaping Use    Vaping Use: Former    Substances: Always   Substance and Sexual Activity    Alcohol use: No    Drug use: No    Sexual activity: Never   Other Topics Concern    Not on file   Social History Narrative    Not on file     Social Determinants of Health     Financial Resource Strain: Low Risk     Difficulty of Paying Living Expenses: Not hard at all   Food Insecurity: No Food Insecurity    Worried About [address] in the Last Year: Never true    [facility] in the Last Year: Never true   Transportation Needs:     Lack of Transportation (Medical):      Lack of Transportation (Non-Medical):    Physical Activity:     Days of Exercise per Week:     Minutes of Exercise per Session:    Stress:     Feeling of Stress :    Social Connections:     Frequency of Communication with Friends and Family:     Frequency of Social Gatherings with Friends and Family:     Attends Anab[employer]ist Services:     Active Member of Clubs or Organizations:     Attends Club or Organization Meetings:     Marital Status:    Intimate Partner Violence:     Fear of Current or Ex-Partner:     Emotionally Abused:     Physically Abused:     Sexually Abused:        PHYSICAL EXAM INITIAL VITALS:  weight is 135 lb (61.2 kg). Her oral temperature is 98.5 °F (36.9 °C). Her blood pressure is 102/50 (abnormal) and her pulse is 57. Her respiration is 16 and oxygen saturation is 96%. Physical Exam  Vitals and nursing note reviewed. Constitutional:       Appearance: Normal appearance. She is not toxic-appearing or diaphoretic. HENT:      Head: Normocephalic and atraumatic. Nose: Nose normal.      Mouth/Throat:      Mouth: Mucous membranes are moist.   Eyes:      Comments: Right prosthetic eye, ptosis to bilateral eyes, sluggish pupillary reaction to the left. Extraocular movements were intact. Cardiovascular:      Rate and Rhythm: Normal rate. Pulses: Normal pulses. Pulmonary:      Effort: Pulmonary effort is normal.      Breath sounds: Normal breath sounds. Abdominal:      General: Abdomen is flat. Palpations: Abdomen is soft. Musculoskeletal:         General: Normal range of motion. Cervical back: Normal range of motion and neck supple. Skin:     General: Skin is warm and dry. Capillary Refill: Capillary refill takes less than 2 seconds. Neurological:      Mental Status: She is lethargic. GCS: GCS eye subscore is 4. GCS verbal subscore is 4. GCS motor subscore is 6. Sensory: Sensation is intact. Motor: Motor function is intact. Coordination: Finger-Nose-Finger Test normal.   Psychiatric:         Mood and Affect: Mood normal.         Behavior: Behavior normal.         DIFFERENTIAL DIAGNOSIS:   Intercranial abnormality, UTI, sepsis,    DIAGNOSTIC RESULTS     EKG: All EKG's are interpreted by the Emergency Department Physician who eithersigns or Co-signs this chart in the absence of a cardiologist.    EKG read and interpreted by myself with comparison to July 2, 2021 gives impression of normal sinus rhythm with heart rate of 88; interval 140; QRS 84;QTc 457; axis R4, T171.      RADIOLOGY: non-plainfilm images(s) such as CT, Ultrasound and MRI are read by the radiologist.  Plain radiographic images are visualized and preliminarily interpreted by the emergency physician unless otherwise stated below. CT HEAD WO CONTRAST   Final Result      1. Stable CT scan of the brain, no interval change since previous MRI scan dated 21 October 2021. **This report has been created using voice recognition software. It may contain minor errors which are inherent in voice recognition technology. **      Final report electronically signed by DR Bella German on 10/29/2021 10:50 AM            LABS:   Labs Reviewed   CBC WITH AUTO DIFFERENTIAL - Abnormal; Notable for the following components:       Result Value    WBC 3.4 (*)     MCHC 31.4 (*)     RDW-CV 16.5 (*)     RDW-SD 56.3 (*)     Lymphocytes Absolute 0.2 (*)     Monocytes Absolute 0.2 (*)     All other components within normal limits   URINALYSIS WITH MICROSCOPIC - Abnormal; Notable for the following components:    Protein, UA TRACE (*)     Nitrite, Urine POSITIVE (*)     Leukocyte Esterase, Urine MODERATE (*)     All other components within normal limits   TSH WITHOUT REFLEX - Abnormal; Notable for the following components:    TSH 7.690 (*)     All other components within normal limits   OSMOLALITY - Abnormal; Notable for the following components:    Osmolality Calc 273.0 (*)     All other components within normal limits   GLOMERULAR FILTRATION RATE, ESTIMATED - Abnormal; Notable for the following components:    Est, Glom Filt Rate 59 (*)     All other components within normal limits   COVID-19, RAPID   CULTURE, BLOOD 1   CULTURE, BLOOD 2   BASIC METABOLIC PANEL   LACTIC ACID, PLASMA   MAGNESIUM   ANION GAP       EMERGENCY DEPARTMENT COURSE:   Vitals:    Vitals:    10/29/21 0924 10/29/21 1113 10/29/21 1341 10/29/21 1547   BP:  (!) 114/47 (!) 103/46 (!) 102/50   Pulse:  67 64 57   Resp:  20 18 16   Temp:    98.5 °F (36.9 °C)   TempSrc:    Oral   SpO2: 97% 96% 97% 96%   Weight: MDM    Patient was seen and evaluated in the emergency department, patient appeared to be in no acute distress, vital signs reviewed, slight hypotension noted. Physical exam was completed, she had ptosis bilaterally, she has artificial right eye, she had coordination that was intact, she had no focal deficits noted. Labs and imaging were obtained, CT the head shows no acute abnormality, lab works mostly reassuring other than urine that appears to be nitrate positive. Discussed findings and plan of care with the patient she is amenable with admission. Discussed the case with Dr. Kaylah Jiménez, who accepts patient for admission. While here in the emergency department patient maintained stable course appropriate for admission.   Medications   ALPRAZolam (XANAX) tablet 0.25 mg (has no administration in time range)   isosorbide mononitrate (IMDUR) extended release tablet 30 mg (has no administration in time range)   levETIRAcetam (KEPPRA) tablet 500 mg (has no administration in time range)   levothyroxine (SYNTHROID) tablet 100 mcg (has no administration in time range)   cetirizine (ZYRTEC) tablet 10 mg (has no administration in time range)   melatonin tablet 3 mg (has no administration in time range)   therapeutic multivitamin-minerals 1 tablet (has no administration in time range)   pantoprazole (PROTONIX) tablet 40 mg (has no administration in time range)   sertraline (ZOLOFT) tablet 50 mg (has no administration in time range)   aspirin EC tablet 81 mg (has no administration in time range)   carboxymethylcellulose (REFRESH PLUS) 0.5 % ophthalmic solution 2 drop (has no administration in time range)   atorvastatin (LIPITOR) tablet 20 mg (has no administration in time range)   traZODone (DESYREL) tablet 50 mg (has no administration in time range)   sodium chloride flush 0.9 % injection 5-40 mL (has no administration in time range)   sodium chloride flush 0.9 % injection 5-40 mL (has no administration in time range) 0.9 % sodium chloride infusion (has no administration in time range)   enoxaparin (LOVENOX) injection 40 mg (has no administration in time range)   ondansetron (ZOFRAN-ODT) disintegrating tablet 4 mg (has no administration in time range)     Or   ondansetron (ZOFRAN) injection 4 mg (has no administration in time range)   polyethylene glycol (GLYCOLAX) packet 17 g (has no administration in time range)   acetaminophen (TYLENOL) tablet 650 mg (has no administration in time range)     Or   acetaminophen (TYLENOL) suppository 650 mg (has no administration in time range)   0.9 % sodium chloride infusion (has no administration in time range)   cefTRIAXone (ROCEPHIN) 1000 mg IVPB in 50 mL D5W minibag (1,000 mg IntraVENous New Bag 10/29/21 1351)       Patient was seenindependently by myself. The patient's final impression and disposition and plan was determined by myself. CRITICAL CARE:   None    CONSULTS:  Dr. James Siddiqi:  None    FINAL IMPRESSION     1. Altered mental status, unspecified altered mental status type          DISPOSITION/PLAN   Patient admitted  PATIENT REFERREDTO:  No follow-up provider specified. DISCHARGE MEDICATIONS:  Current Discharge Medication List          (Please note that portions of this note were completed with a voice recognition program.  Efforts were made to edit the dictations but occasionally words are mis-transcribed.)      Provider:  I personally performed the services described in the documentation,reviewed and edited the documentation which was dictated to the scribe in my presence, and it accurately records my words and actions.     Ken Koroma CNP 10/29/21 5:49 PM    Bill Koroma, APRN - CNP        [employer], PAMELA - CNP  10/29/21 0120

## 2021-10-29 NOTE — ED NOTES
ED to inpatient nurses report    Chief Complaint   Patient presents with    Fatigue      Present to ED from home  LOC: alert and orientated to name, place, date  Vital signs   Vitals:    10/29/21 0922 10/29/21 0924 10/29/21 1113 10/29/21 1341   BP: (!) 97/48  (!) 114/47 (!) 103/46   Pulse: 81  67 64   Resp: 22  20 18   Temp: 99.2 °F (37.3 °C)      TempSrc: Oral      SpO2: 91% 97% 96% 97%   Weight: 135 lb (61.2 kg)         Oxygen Baseline 97%    Current needs required RA Bipap/Cpap No  LDAs:   Peripheral IV 10/29/21 Left Antecubital (Active)   Site Assessment Clean; Intact;Dry 10/29/21 0931   Line Status Blood return noted;Specimen collected 10/29/21 0931   Dressing Status Clean;Dry; Intact 10/29/21 0931   Dressing Intervention New 10/29/21 0931     Mobility: Requires assistance * 1  Pending ED orders: NA  Present condition: stable      Electronically signed by Vinicio Goodwin RN on 10/29/2021 at 2:23 PM       Vinicio Goodwin RN  10/29/21 3741

## 2021-10-29 NOTE — ED NOTES
Bed: 042A  Expected date:   Expected time:   Means of arrival:   Comments:  SAUL Prater RN  10/29/21 3759

## 2021-10-29 NOTE — PROGRESS NOTES
Pt admitted to  5K26 via bed from ED. Complaints: weakness. IV into the antecubital left, condition patent and no redness. IV site free of s/s of infection or infiltration. Vital signs obtained. Assessment and data collection initiated. Two nurse skin assessment performed by Cassidy Maxwell and YADY Lew. Oriented to room. Bed alarm on. Call light in reach.

## 2021-10-30 PROCEDURE — 1200000000 HC SEMI PRIVATE

## 2021-10-30 PROCEDURE — 6370000000 HC RX 637 (ALT 250 FOR IP): Performed by: EMERGENCY MEDICINE

## 2021-10-30 PROCEDURE — 2580000003 HC RX 258: Performed by: FAMILY MEDICINE

## 2021-10-30 PROCEDURE — 6360000002 HC RX W HCPCS: Performed by: EMERGENCY MEDICINE

## 2021-10-30 PROCEDURE — 2580000003 HC RX 258: Performed by: EMERGENCY MEDICINE

## 2021-10-30 PROCEDURE — 6370000000 HC RX 637 (ALT 250 FOR IP): Performed by: FAMILY MEDICINE

## 2021-10-30 RX ORDER — POLYETHYLENE GLYCOL 3350 17 G/17G
17 POWDER, FOR SOLUTION ORAL DAILY
Status: DISCONTINUED | OUTPATIENT
Start: 2021-10-30 | End: 2021-11-01 | Stop reason: HOSPADM

## 2021-10-30 RX ADMIN — SERTRALINE 50 MG: 50 TABLET, FILM COATED ORAL at 11:02

## 2021-10-30 RX ADMIN — POLYETHYLENE GLYCOL 3350 17 G: 17 POWDER, FOR SOLUTION ORAL at 15:23

## 2021-10-30 RX ADMIN — CARBOXYMETHYLCELLULOSE SODIUM 2 DROP: 5 SOLUTION/ DROPS OPHTHALMIC at 22:17

## 2021-10-30 RX ADMIN — ACETAMINOPHEN 650 MG: 325 TABLET ORAL at 15:23

## 2021-10-30 RX ADMIN — CEFTRIAXONE SODIUM 1000 MG: 1 INJECTION, POWDER, FOR SOLUTION INTRAMUSCULAR; INTRAVENOUS at 07:04

## 2021-10-30 RX ADMIN — PANTOPRAZOLE SODIUM 40 MG: 40 TABLET, DELAYED RELEASE ORAL at 11:02

## 2021-10-30 RX ADMIN — ONDANSETRON 4 MG: 4 TABLET, ORALLY DISINTEGRATING ORAL at 22:17

## 2021-10-30 RX ADMIN — Medication 1 TABLET: at 11:02

## 2021-10-30 RX ADMIN — LEVOTHYROXINE SODIUM 100 MCG: 0.1 TABLET ORAL at 06:10

## 2021-10-30 RX ADMIN — ATORVASTATIN CALCIUM 20 MG: 20 TABLET, FILM COATED ORAL at 11:01

## 2021-10-30 RX ADMIN — ASPIRIN 81 MG: 81 TABLET, FILM COATED ORAL at 11:01

## 2021-10-30 RX ADMIN — ENOXAPARIN SODIUM 40 MG: 40 INJECTION SUBCUTANEOUS at 15:23

## 2021-10-30 RX ADMIN — SODIUM CHLORIDE: 9 INJECTION, SOLUTION INTRAVENOUS at 06:05

## 2021-10-30 RX ADMIN — ALPRAZOLAM 0.25 MG: 0.5 TABLET ORAL at 22:17

## 2021-10-30 RX ADMIN — Medication 3 MG: at 02:12

## 2021-10-30 RX ADMIN — CETIRIZINE HYDROCHLORIDE 10 MG: 10 TABLET, FILM COATED ORAL at 11:02

## 2021-10-30 RX ADMIN — ALPRAZOLAM 0.25 MG: 0.5 TABLET ORAL at 02:12

## 2021-10-30 RX ADMIN — SODIUM CHLORIDE: 9 INJECTION, SOLUTION INTRAVENOUS at 17:37

## 2021-10-30 RX ADMIN — LEVETIRACETAM 500 MG: 500 TABLET, FILM COATED ORAL at 22:17

## 2021-10-30 RX ADMIN — ISOSORBIDE MONONITRATE 30 MG: 30 TABLET, EXTENDED RELEASE ORAL at 11:02

## 2021-10-30 RX ADMIN — Medication 3 MG: at 22:17

## 2021-10-30 RX ADMIN — LEVETIRACETAM 500 MG: 500 TABLET, FILM COATED ORAL at 11:02

## 2021-10-30 ASSESSMENT — PAIN SCALES - GENERAL
PAINLEVEL_OUTOF10: 0
PAINLEVEL_OUTOF10: 0
PAINLEVEL_OUTOF10: 3

## 2021-10-30 NOTE — PROGRESS NOTES
Patient: Levi Louis  Unit/Bed: 5K-26/026-A  YOB: 1933  MRN: [id number] Acct: [de-identified]   Admitting Diagnosis: Generalized weakness [R53.1]  Altered mental status, unspecified altered mental status type [R41.82]  Admit Date:  10/29/2021  Hospital Day: 1    Assessment:     Principal Problem:    Generalized weakness  Active Problems:    Breast cancer metastasized to brain, right ([address]    Status post craniotomy    Impaired functional mobility, balance, gait, and endurance    GERD (gastroesophageal reflux disease)    Temporal arteritis (HCC)    Hypothyroidism due to acquired atrophy of thyroid    Steroid-induced hyperglycemia    Blind right eye    Current chronic use of systemic steroids    Hx of breast cancer with liver mets    Acute cystitis without hematuria    Coronary artery disease involving native coronary artery of native heart without angina pectoris    Anxiety  Resolved Problems:    * No resolved hospital problems. *      Plan:     Decrease the IVF  Make the rocephin once daily. No culture was done on the urine and she's had several doses of the ATB now. Will continue IV today and reassess tomorrow  Change the glycolax to scheduled        Subjective:     Patient has complaints of some stomach upset due to not having her teeth for chewing. She denies CP, SOB, or Dysuria. .   Medication side effects: none    Scheduled Meds:   [START ON 10/31/2021] cefTRIAXone (ROCEPHIN) IV  1,000 mg IntraVENous Q24H    polyethylene glycol  17 g Oral Daily    isosorbide mononitrate  30 mg Oral Daily    levETIRAcetam  500 mg Oral BID    levothyroxine  100 mcg Oral Daily    cetirizine  10 mg Oral Daily    therapeutic multivitamin-minerals  1 tablet Oral Daily    pantoprazole  40 mg Oral Daily    sertraline  50 mg Oral Daily    aspirin  81 mg Oral Daily    carboxymethylcellulose  2 drop Both Eyes BID    atorvastatin  20 mg Oral Daily    sodium chloride flush  5-40 mL IntraVENous 2 times per day    enoxaparin  40 mg SubCUTAneous Q24H     Continuous Infusions:   sodium chloride      sodium chloride 100 mL/hr at 10/30/21 0605     PRN Meds:ALPRAZolam, melatonin, traZODone, sodium chloride flush, sodium chloride, ondansetron **OR** ondansetron, acetaminophen **OR** acetaminophen    Review of Systems  Pertinent items are noted in HPI. Objective:     Patient Vitals for the past 8 hrs:   BP Temp Temp src Pulse Resp SpO2   10/30/21 0940 (!) 142/63 97.5 °F (36.4 °C) Oral 62 14 91 %   10/30/21 0600 127/60 97.9 °F (36.6 °C) Oral 86 15 91 %     I/O last 3 completed shifts: In: 6183 [P.O.:180; I.V.:1009]  Out: -   No intake/output data recorded.     BP (!) 142/63   Pulse 62   Temp 97.5 °F (36.4 °C) (Oral)   Resp 14   Wt 135 lb (61.2 kg)   LMP  (LMP Unknown)   SpO2 91%   BMI 26.37 kg/m²     General appearance: alert, appears stated age and cooperative  Head: Normocephalic, without obvious abnormality, atraumatic  Lungs: clear to auscultation bilaterally  Heart: regular rate and rhythm, S1, S2 normal, no murmur, click, rub or gallop  Abdomen: soft, non-tender; bowel sounds normal; no masses,  no organomegaly  Extremities: extremities normal, atraumatic, no cyanosis or edema  Skin: Skin color, texture, turgor normal. No rashes or lesions  Neurologic: weak      Electronically signed by Jalyn Red MD on 10/30/2021 at 1:33 PM

## 2021-10-30 NOTE — H&P
[address] [place] [address]                              HISTORY AND PHYSICAL    PATIENT NAME: Rachele Hernandez                      :        1933  MED REC NO:   [id number]                           ROOM:       [id number]  ACCOUNT NO:   [de-identified]                           ADMIT DATE: 10/29/2021  PROVIDER:     Martha Turk. Ale Cueva M.D.    [address]:  The patient is an 63-year-old female, presented to the  emergency room because of generalized weakness, confusion, mental status  change. HISTORY OF PRESENT ILLNESS:  The patient is an 63-year-old female known  to me through the office being her primary care provider. The patient  states that she has not been feeling well for the past five days and  mostly in bed. She has generalized weakness. The patient has been  confused. The patient had history of breast cancer, metastasized to the  liver, to the brain; had a craniotomy with debulking of the brain tumor  by Dr. Amarilis Gonzalez in 2021. Today, her caretaker and aide came to see her;  however, the patient was unable to get up from her bed for which her  caretaker had called 911 and the patient was mostly noted in the bed. The patient did not have any shortness of breath or chest pain. No  nausea or vomiting. PAST MEDICAL HISTORY:  The patient has history of breast cancer that  metastasized to the liver and brain, had hypothyroidism,  hypoestrogenism, anxiety, sigmoid diverticulosis, had blindness in the  right eye with enucleation, temporal arteritis, hypercholesterolemia,  steroid-induced hyperglycemia. PAST SURGICAL HISTORY:  Including craniotomy, enucleation in the right  eye, had D and C, cholecystectomy, pulmonary subclavian shunt,  hysterectomy. FAMILY HISTORY:  Significant for cancer. SOCIAL HISTORY:  The patient lives alone, had good social support,  mostly nieces. MEDICATIONS:  See nursing notes.     ALLERGIES: THE PATIENT IS ALLERGIC TO BACTRIM, DEMEROL, PENICILLIN. PHYSICAL EXAMINATION:  GENERAL:  When I had seen the patient, the patient was seen in the  emergency room. VITAL SIGNS:  Blood pressure is 114/47, pulse 67, respirations 20. HEENT:  Head:  Normocephalic, atraumatic. Tympanic membranes normal.   No nasal discharge. No tenderness in the sinuses. Oral mucosa is  moist.  No tonsillopharyngeal congestion. NECK:  Supple. LUNGS:  Decreased breath sounds. HEART:  Regular rate and rhythm. No murmur. ABDOMEN:  Flat, depressible. Nontender. No hepatosplenomegaly. Normal  bowel sounds. BACK:  Showed tenderness in the right CVA area. EXTREMITIES:  No edema. NEUROLOGIC:  Motor, sensory, cranial nerves are all intact. ASSESSMENT:  1. Generalized weakness. 2.  Acute pyelonephritis/UTI. 3.  History of breast cancer, metastasized to the brain and also into  the liver. 4.  Impaired functional mobility, balance. 5.  GERD. 6.  History of temporal arteritis with right eye blindness then enucleation. 7.  Hypothyroidism. 8.  Hypercholesterolemia. 9.  Steroid-induced hyperglycemia. 10.  Anxiety. PLAN:  The patient was placed on Rocephin, resume home medications. I  consulted the Physical Therapy for ambulation. The patient is going to  get Lovenox and SCDs for DVT prophylaxis. The patient will be followed  up accordingly and further orders will be based on clinical course and  laboratory and x-ray findings. CYNTHIA Ahn M.D.    D: 10/29/2021 18:11:16       T: 10/29/2021 18:14:49     [email or web address]  Job#: [id number]     Doc#: [id number]    CC:

## 2021-10-31 PROCEDURE — 6360000002 HC RX W HCPCS: Performed by: FAMILY MEDICINE

## 2021-10-31 PROCEDURE — 6370000000 HC RX 637 (ALT 250 FOR IP): Performed by: FAMILY MEDICINE

## 2021-10-31 PROCEDURE — 6360000002 HC RX W HCPCS: Performed by: EMERGENCY MEDICINE

## 2021-10-31 PROCEDURE — 6370000000 HC RX 637 (ALT 250 FOR IP): Performed by: EMERGENCY MEDICINE

## 2021-10-31 PROCEDURE — 1200000000 HC SEMI PRIVATE

## 2021-10-31 PROCEDURE — 2580000003 HC RX 258: Performed by: FAMILY MEDICINE

## 2021-10-31 RX ADMIN — CARBOXYMETHYLCELLULOSE SODIUM 2 DROP: 5 SOLUTION/ DROPS OPHTHALMIC at 09:04

## 2021-10-31 RX ADMIN — ISOSORBIDE MONONITRATE 30 MG: 30 TABLET, EXTENDED RELEASE ORAL at 09:03

## 2021-10-31 RX ADMIN — SERTRALINE 50 MG: 50 TABLET, FILM COATED ORAL at 09:04

## 2021-10-31 RX ADMIN — SODIUM CHLORIDE: 9 INJECTION, SOLUTION INTRAVENOUS at 06:19

## 2021-10-31 RX ADMIN — POLYETHYLENE GLYCOL 3350 17 G: 17 POWDER, FOR SOLUTION ORAL at 09:03

## 2021-10-31 RX ADMIN — LEVOTHYROXINE SODIUM 100 MCG: 0.1 TABLET ORAL at 06:17

## 2021-10-31 RX ADMIN — LEVETIRACETAM 500 MG: 500 TABLET, FILM COATED ORAL at 09:03

## 2021-10-31 RX ADMIN — ENOXAPARIN SODIUM 40 MG: 40 INJECTION SUBCUTANEOUS at 17:39

## 2021-10-31 RX ADMIN — ASPIRIN 81 MG: 81 TABLET, FILM COATED ORAL at 09:03

## 2021-10-31 RX ADMIN — CETIRIZINE HYDROCHLORIDE 10 MG: 10 TABLET, FILM COATED ORAL at 09:03

## 2021-10-31 RX ADMIN — TRAZODONE HYDROCHLORIDE 50 MG: 50 TABLET ORAL at 20:45

## 2021-10-31 RX ADMIN — PANTOPRAZOLE SODIUM 40 MG: 40 TABLET, DELAYED RELEASE ORAL at 09:03

## 2021-10-31 RX ADMIN — CARBOXYMETHYLCELLULOSE SODIUM 2 DROP: 5 SOLUTION/ DROPS OPHTHALMIC at 20:45

## 2021-10-31 RX ADMIN — CEFTRIAXONE SODIUM 1000 MG: 1 INJECTION, POWDER, FOR SOLUTION INTRAMUSCULAR; INTRAVENOUS at 06:16

## 2021-10-31 RX ADMIN — LEVETIRACETAM 500 MG: 500 TABLET, FILM COATED ORAL at 20:45

## 2021-10-31 RX ADMIN — Medication 1 TABLET: at 09:03

## 2021-10-31 RX ADMIN — ATORVASTATIN CALCIUM 20 MG: 20 TABLET, FILM COATED ORAL at 09:04

## 2021-10-31 ASSESSMENT — PAIN SCALES - GENERAL
PAINLEVEL_OUTOF10: 0

## 2021-10-31 NOTE — PROGRESS NOTES
Patient: Yuki Crow  Unit/Bed: 5K-26/026-A  YOB: 1933  MRN: [id number] Acct: [de-identified]   Admitting Diagnosis: Generalized weakness [R53.1]  Altered mental status, unspecified altered mental status type [R41.82]  Admit Date:  10/29/2021  Hospital Day: 2    Assessment:     Principal Problem:    Generalized weakness  Active Problems:    Breast cancer metastasized to brain, right ([address]    Status post craniotomy    Impaired functional mobility, balance, gait, and endurance    GERD (gastroesophageal reflux disease)    Temporal arteritis (HCC)    Hypothyroidism due to acquired atrophy of thyroid    Steroid-induced hyperglycemia    Blind right eye    Current chronic use of systemic steroids    Hx of breast cancer with liver mets    Acute cystitis without hematuria    Coronary artery disease involving native coronary artery of native heart without angina pectoris    Anxiety  Resolved Problems:    * No resolved hospital problems. *      Plan:     Continue on the IV ATB as no urine culture was obtained        Subjective:     Patient has no complaint of CP, SOB, GI upset or dysuria. .   Medication side effects: none    Scheduled Meds:   cefTRIAXone (ROCEPHIN) IV  1,000 mg IntraVENous Q24H    polyethylene glycol  17 g Oral Daily    isosorbide mononitrate  30 mg Oral Daily    levETIRAcetam  500 mg Oral BID    levothyroxine  100 mcg Oral Daily    cetirizine  10 mg Oral Daily    therapeutic multivitamin-minerals  1 tablet Oral Daily    pantoprazole  40 mg Oral Daily    sertraline  50 mg Oral Daily    aspirin  81 mg Oral Daily    carboxymethylcellulose  2 drop Both Eyes BID    atorvastatin  20 mg Oral Daily    sodium chloride flush  5-40 mL IntraVENous 2 times per day    enoxaparin  40 mg SubCUTAneous Q24H     Continuous Infusions:   sodium chloride      sodium chloride 75 mL/hr at 10/31/21 0619     PRN Meds:ALPRAZolam, melatonin, traZODone, sodium chloride flush, sodium chloride, ondansetron **OR** ondansetron, acetaminophen **OR** acetaminophen    Review of Systems  Pertinent items are noted in HPI. Objective:     Patient Vitals for the past 8 hrs:   BP Temp Temp src Pulse Resp SpO2 Weight   10/31/21 0500 (!) 122/56 97.5 °F (36.4 °C) Oral 57 16 91 % 142 lb 4.8 oz (64.5 kg)   10/31/21 0030 118/60   65 18 94 %      I/O last 3 completed shifts: In: 1986.7 [P.O.:120; I.V.:1866.7]  Out: -   No intake/output data recorded.     BP (!) 122/56   Pulse 57   Temp 97.5 °F (36.4 °C) (Oral)   Resp 16   Wt 142 lb 4.8 oz (64.5 kg)   LMP  (LMP Unknown)   SpO2 91%   BMI 27.79 kg/m²     General appearance: alert, appears stated age and cooperative  Head: Normocephalic, without obvious abnormality, atraumatic  Lungs: clear to auscultation bilaterally  Heart: regular rate and rhythm, S1, S2 normal, no murmur, click, rub or gallop  Abdomen: soft, non-tender; bowel sounds normal; no masses,  no organomegaly  Extremities: extremities normal, atraumatic, no cyanosis or edema  Skin: Skin color, texture, turgor normal. No rashes or lesions  Neurologic: weak      Electronically signed by Alesha Gamble MD on 10/31/2021 at 8:23 AM

## 2021-11-01 VITALS
BODY MASS INDEX: 27.79 KG/M2 | RESPIRATION RATE: 16 BRPM | WEIGHT: 142.3 LBS | HEART RATE: 63 BPM | DIASTOLIC BLOOD PRESSURE: 74 MMHG | OXYGEN SATURATION: 93 % | SYSTOLIC BLOOD PRESSURE: 182 MMHG | TEMPERATURE: 97.6 F

## 2021-11-01 LAB
ACINETOBACTER BAUMANNII FILM ARRAY: NOT DETECTED
BOTTLE TYPE: ABNORMAL
CANDIDA ALBICANS FILM ARRAY: NOT DETECTED
CANDIDA GLABRATA FILM ARRAY: NOT DETECTED
CANDIDA KRUSEI FILM ARRAY: NOT DETECTED
CANDIDA PARAPSILOSIS FILM ARRAY: NOT DETECTED
CANDIDA TROPICALIS FILM ARRAY: NOT DETECTED
CARBAPENEM RESITANT FILM ARRAY: ABNORMAL
ENTERBACTER CLOACAE FILM ARRAY: NOT DETECTED
ENTERBACTERIACEAE FILM ARRAY: NOT DETECTED
ENTEROCOCCUS FILM ARRAY: NOT DETECTED
ESCHERICHIA COLI FILM ARRAY: NOT DETECTED
HAEMOPHILUS INFLUENZA FILM ARRAY: NOT DETECTED
KLEBSIELLA OXYTOCA FILM ARRAY: NOT DETECTED
KLEBSIELLA PNEUMONIAE FILM ARRAY: NOT DETECTED
LISTERIA MONOCYTOGENES FILM ARRAY: NOT DETECTED
METHICILLIN RESISTANT FILM ARRAY: DETECTED
NEISSERIA MENIGITIDIS FILM ARRAY: NOT DETECTED
PROTEUS FILM ARRAY: NOT DETECTED
PSEUDOMONAS AERUGINOSA FILM ARRAY: NOT DETECTED
SERRATIA MARCESCENS FILM ARRAY: NOT DETECTED
SOURCE OF BLOOD CULTURE: ABNORMAL
STAPH AUREUS FILM ARRAY: NOT DETECTED
STAPHYLOCOCCUS FILM ARRAY: DETECTED
STREP AGALACTIAE FILM ARRAY: NOT DETECTED
STREP PNEUMONIAE FILM ARRAY: NOT DETECTED
STREP PYOCGENES FILM ARRAY: NOT DETECTED
STREPTOCOCCUS FILM ARRAY: NOT DETECTED
VANCOMYCIN RESISTANT FILM ARRAY: ABNORMAL

## 2021-11-01 PROCEDURE — 6370000000 HC RX 637 (ALT 250 FOR IP): Performed by: EMERGENCY MEDICINE

## 2021-11-01 PROCEDURE — 6360000002 HC RX W HCPCS: Performed by: FAMILY MEDICINE

## 2021-11-01 PROCEDURE — 2580000003 HC RX 258: Performed by: FAMILY MEDICINE

## 2021-11-01 RX ORDER — CIPROFLOXACIN 500 MG/1
500 TABLET, FILM COATED ORAL 2 TIMES DAILY
Qty: 14 TABLET | Refills: 0 | Status: SHIPPED | OUTPATIENT
Start: 2021-11-01 | End: 2021-11-08

## 2021-11-01 RX ORDER — SENNA AND DOCUSATE SODIUM 50; 8.6 MG/1; MG/1
1-2 TABLET, FILM COATED ORAL DAILY PRN
Qty: 30 TABLET | Refills: 0 | Status: SHIPPED | OUTPATIENT
Start: 2021-11-01 | End: 2021-12-09 | Stop reason: SDUPTHER

## 2021-11-01 RX ORDER — PREDNISONE 1 MG/1
1 TABLET ORAL DAILY
Qty: 60 TABLET | Refills: 2 | Status: ON HOLD | OUTPATIENT
Start: 2021-11-01 | End: 2022-01-25 | Stop reason: SDUPTHER

## 2021-11-01 RX ADMIN — CARBOXYMETHYLCELLULOSE SODIUM 2 DROP: 5 SOLUTION/ DROPS OPHTHALMIC at 08:54

## 2021-11-01 RX ADMIN — CETIRIZINE HYDROCHLORIDE 10 MG: 10 TABLET, FILM COATED ORAL at 08:52

## 2021-11-01 RX ADMIN — CEFTRIAXONE SODIUM 1000 MG: 1 INJECTION, POWDER, FOR SOLUTION INTRAMUSCULAR; INTRAVENOUS at 08:51

## 2021-11-01 RX ADMIN — LEVOTHYROXINE SODIUM 100 MCG: 0.1 TABLET ORAL at 08:52

## 2021-11-01 RX ADMIN — LEVETIRACETAM 500 MG: 500 TABLET, FILM COATED ORAL at 08:52

## 2021-11-01 RX ADMIN — SERTRALINE 50 MG: 50 TABLET, FILM COATED ORAL at 08:53

## 2021-11-01 RX ADMIN — Medication 1 TABLET: at 08:52

## 2021-11-01 RX ADMIN — ISOSORBIDE MONONITRATE 30 MG: 30 TABLET, EXTENDED RELEASE ORAL at 08:52

## 2021-11-01 RX ADMIN — ATORVASTATIN CALCIUM 20 MG: 20 TABLET, FILM COATED ORAL at 08:53

## 2021-11-01 RX ADMIN — ASPIRIN 81 MG: 81 TABLET, FILM COATED ORAL at 08:54

## 2021-11-01 RX ADMIN — PANTOPRAZOLE SODIUM 40 MG: 40 TABLET, DELAYED RELEASE ORAL at 08:54

## 2021-11-01 ASSESSMENT — PAIN SCALES - GENERAL
PAINLEVEL_OUTOF10: 0
PAINLEVEL_OUTOF10: 0

## 2021-11-01 NOTE — CARE COORDINATION
11/1/21, 1:50 PM EDT    DISCHARGE PLANNING EVALUATION    Patient is to be discharged today. RN Arlene Nix has advised that family will be transporting patient home. Call placed to [facility] Michaels and message left re: discharge. Call to [facility] ([place]) and advised her of patient discharge. They are providing nursing and aide services. Staff can access patient information from [address] No other needs. 11/1/21, 1:57 PM EDT    Patient goals/plan/ treatment preferences discussed by  and . Patient goals/plan/ treatment preferences reviewed with patient/ family. Patient/ family verbalize understanding of discharge plan and are in agreement with goal/plan/treatment preferences. Understanding was demonstrated using the teach back method. AVS provided by RN at time of discharge, which includes all necessary medical information pertaining to the patients current course of illness, treatment, post-discharge goals of care, and treatment preferences. Services After Discharge  Services At/After Discharge: Nursing Services, Aide Services, Community Resources ([facility])   IMM Letter  IMM Letter given to Patient/Family/Significant other/Guardian/POA/by[de-identified] Copy delivered to patient by mgr. Ewing  IMM Letter date given[de-identified] 11/01/21  IMM Letter time given[de-identified] 1767

## 2021-11-01 NOTE — PROGRESS NOTES
[address] [facility] Press About Us THERAPY MISSED TREATMENT NOTE  [facility] ONC MED 5K  5K-26/026-A      Date: 2021  Patient Name: Desmond Bowles        CSN: [id number]   : 1933  (80 y.o.)  Gender: female                REASON FOR MISSED TREATMENT: OT orders received. RN approved session, states patient is being discharged and waiting on her ride. patient seated up in recliner upon OT arrival and was edu on purpose/benefits of OT and states she has no OT concerns and feels safe with returning home. patient focused on where her ride was. OT to discontinue orders. please refer if plans change.

## 2021-11-01 NOTE — PROGRESS NOTES
Discharge instructions explained to patient with verbalized understanding. Patient denied further questions. Patient returning home with all personal belongings. Patient stanley Potts transporting.

## 2021-11-01 NOTE — PROGRESS NOTES
Family Medicine Progress Notes/Coverage    Today's Date: 11/1/21  5K-26/026-A  Medical Record # [id number]  CSN/Account # [de-identified]      Ms. Rod admitted on 10/29/2021        Subjective / Interval History :     feeling well  I'm ready to go home  Reviewed notes, consults, laboratory and radiology results,    Objective:       Physical Exam:  Patient Vitals for the past 24 hrs:   BP Temp Temp src Pulse Resp SpO2   11/01/21 0838 (!) 182/74 97.6 °F (36.4 °C) Oral 63 16 93 %   11/01/21 0237 131/62 98.3 °F (36.8 °C) Oral 55 16 92 %   10/31/21 2043 (!) 144/65 97.7 °F (36.5 °C) Oral 62 18 93 %   10/31/21 1541 (!) 123/59 98.2 °F (36.8 °C) Oral 59 16 91 %       General Appearance:  Alert, cooperative, no distress, appears stated age   [de-identified]:  Neck:      Chest/Lungs:  Heart: CTA  RRR   Abdomen:  Back: Soft non tender   Extremities:  Neurological Exam:        Assessment:     Admitting Diagnosis:    Generalized weakness [R53.1]  Altered mental status, unspecified altered mental status type [R41.82]    Active Hospital Problems    Diagnosis Date Noted    Generalized weakness [R53.1] 10/29/2021     Priority: High    Acute cystitis without hematuria [N30.00] 07/15/2021     Priority: Medium    Impaired functional mobility, balance, gait, and endurance [Z74.09] 07/08/2021     Priority: Medium    Breast cancer metastasized to brain, right ([facility] [address] [C50.911, C79.31] 07/06/2021     Priority: Medium    Status post craniotomy [Z98.890] 07/02/2021     Priority: Medium    Hx of breast cancer with liver mets [Z85.3] 08/13/2020     Priority: Medium    Current chronic use of systemic steroids [Z79.52] 09/04/2018     Priority: Medium    Coronary artery disease involving native coronary artery of native heart without angina pectoris [I25.10] 11/06/2017     Priority: Medium    Anxiety [F41.9] 06/29/2016     Priority: Medium    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]      Priority: Medium    Steroid-induced hyperglycemia [R73.9, T38.0X5A] 04/19/2016     Priority: Medium    Hypothyroidism due to acquired atrophy of thyroid [E03.4] 03/03/2016     Priority: Medium    Temporal arteritis ([address] [M31.6]      Priority: Medium    GERD (gastroesophageal reflux disease) [K21.9]      Priority: Medium       Plan:     Discussed plans with the nursing staff  Discharge home on Cipro Pericolace  Resume back on Prednisone    Medications, Laboratories and Imaging results:    Scheduled Meds:   cefTRIAXone (ROCEPHIN) IV  1,000 mg IntraVENous Q24H    polyethylene glycol  17 g Oral Daily    isosorbide mononitrate  30 mg Oral Daily    levETIRAcetam  500 mg Oral BID    levothyroxine  100 mcg Oral Daily    cetirizine  10 mg Oral Daily    therapeutic multivitamin-minerals  1 tablet Oral Daily    pantoprazole  40 mg Oral Daily    sertraline  50 mg Oral Daily    aspirin  81 mg Oral Daily    carboxymethylcellulose  2 drop Both Eyes BID    atorvastatin  20 mg Oral Daily    sodium chloride flush  5-40 mL IntraVENous 2 times per day    enoxaparin  40 mg SubCUTAneous Q24H     Continuous Infusions:   sodium chloride      sodium chloride 75 mL/hr at 10/31/21 0619     PRN Meds:ALPRAZolam, melatonin, traZODone, sodium chloride flush, sodium chloride, ondansetron **OR** ondansetron, acetaminophen **OR** acetaminophen    Imaging:    Lab Review :    Lab Results   Component Value Date    WBC 3.4 (L) 10/29/2021    HGB 14.1 10/29/2021    HCT 44.9 10/29/2021    MCV 92.2 10/29/2021     10/29/2021     Lab Results   Component Value Date    CREATININE 0.9 10/29/2021    BUN 15 10/29/2021     10/29/2021    K 4.2 10/29/2021    CL 98 10/29/2021    CO2 26 10/29/2021     Lab Results   Component Value Date    CKTOTAL 70 06/28/2021     Lab Results   Component Value Date    ALT 13 09/07/2021    AST 23 09/07/2021    ALKPHOS 49 09/07/2021

## 2021-11-02 ENCOUNTER — CARE COORDINATION (OUTPATIENT)
Dept: FAMILY MEDICINE CLINIC | Age: 86
End: 2021-11-02

## 2021-11-02 LAB
BLOOD CULTURE, ROUTINE: ABNORMAL
BLOOD CULTURE, ROUTINE: ABNORMAL
ORGANISM: ABNORMAL

## 2021-11-02 NOTE — CARE COORDINATION
[facility] [facility] Transitions Initial Follow Up Call    Outreach made within 2 business days of discharge: Yes    Patient: Reddy Garcia Patient : 1933   MRN: [id number]  Reason for Admission: weakness. confusion  Discharge Date: 21       Spoke with: [address] Nurse    Discharge department/facility: [facility] Interactive Patient Contact:  Was patient able to fill all prescriptions: Yes  Was patient instructed to bring all medications to the follow-up visit: Yes  Is patient taking all medications as directed in the discharge summary? Yes   Does patient understand their discharge instructions: Yes   Does patient have questions or concerns that need addressed prior to 7-14 day follow up office visit: no    Scheduled appointment with PCP within 7-14 days    Follow Up  Future Appointments   Date Time Provider Irene Shah   2021 12:10 PM MD Jose Eduardo Narvaez [address] is doing well. She told me hello and I was update by her home health Nurse who was there to visit. She has an appointment with Dr Delia Spaulding for treatment and also a follow up with us. She will be here for that. Her home health care has resumed at home and her family is very supportive.     Aurelia Wong RN

## 2021-11-03 LAB — BLOOD CULTURE, ROUTINE: NORMAL

## 2021-11-10 ENCOUNTER — CARE COORDINATION (OUTPATIENT)
Dept: FAMILY MEDICINE CLINIC | Age: 86
End: 2021-11-10

## 2021-11-10 NOTE — CARE COORDINATION
Alina Gold is feeling a little better. She cancelled her appointment because she did not feel up to coming in that day. She has an appointment with her oncologist tomorrow and said she is getting chemotherapy. She has visiting nurses and therapy coming to her house and she denied any additional needs at this time. She said she will come for an appointment when she feels stronger.

## 2021-11-15 NOTE — TELEPHONE ENCOUNTER
Date of last visit:  9/3/2021   Date of next visit:  Visit date not found    Requested Prescriptions     Pending Prescriptions Disp Refills    loratadine (CLARITIN) 10 MG tablet [Pharmacy Med Name: LORATADINE 10 MG TABLET] 90 tablet 1     Sig: take 1 tablet by mouth once daily

## 2021-11-16 ENCOUNTER — CARE COORDINATION (OUTPATIENT)
Dept: FAMILY MEDICINE CLINIC | Age: 86
End: 2021-11-16

## 2021-11-16 RX ORDER — HYDRALAZINE HYDROCHLORIDE 25 MG/1
25 TABLET, FILM COATED ORAL EVERY 8 HOURS SCHEDULED
Qty: 360 TABLET | Refills: 2 | Status: ON HOLD | OUTPATIENT
Start: 2021-11-16 | End: 2022-01-25 | Stop reason: HOSPADM

## 2021-11-16 RX ORDER — LORATADINE 10 MG/1
TABLET ORAL
Qty: 90 TABLET | Refills: 1 | Status: SHIPPED | OUTPATIENT
Start: 2021-11-16

## 2021-11-16 RX ORDER — LEVETIRACETAM 500 MG/1
TABLET ORAL
Qty: 240 TABLET | Refills: 2 | Status: SHIPPED | OUTPATIENT
Start: 2021-11-16

## 2021-11-16 NOTE — CARE COORDINATION
Keisha Dawn said she is feeling much better. She said her appointment with her oncologist was \"good news\" but she did not elaborate. I told her she will have to come and see us someday and she said she would. She denied any needs at this time. She said that her nurse is still coming.

## 2021-11-22 ENCOUNTER — CARE COORDINATION (OUTPATIENT)
Dept: FAMILY MEDICINE CLINIC | Age: 86
End: 2021-11-22

## 2021-11-28 NOTE — DISCHARGE SUMMARY
[address] [id number]                               DISCHARGE SUMMARY    PATIENT NAME: Danica Galaviz                      :        1933  MED REC NO:   [id number]                           ROOM:       6004  ACCOUNT NO:   [de-identified]                           ADMIT DATE: 10/29/2021  PROVIDER:     Rama Reeves. SARAH Mata Junior Servant: 2021    FINAL DIAGNOSES:  1. Generalized weakness. 2.  Acute cystitis without hematuria. 3.  Impaired functional mobility, balance, gait and endurance. 4.  Status post craniotomy. 5.  Breast cancer, metastasized to the brain. 6.  History of breast cancer with liver mets. 7.  Chronic use of systemic steroids. 8.  Right eye blindness. 9.  Temporal arteritis. 10.  Coronary artery disease. 11. Anxiety. 12.  Steroid induced hyperglycemia. 13.  Hypothyroidism. 14.  GERD. DISCHARGE HOME MEDICATIONS:  Include,  1. Cipro 500 mg one tablet twice a day. 2.  Senokot one to two tablets daily for constipation p.r.n.  3.  Deltasone 1 mg one tablet daily. 4.  Xanax 0.25 mg one tablet twice a day as needed for anxiety. 5.  Synthroid 100 mcg one tablet daily. 6.  Refresh ophthalmic solution use to the eye as needed. 7.  Zofran ODT 4 mg every eight hours p.r.n.  8.  Trazodone 50 mg one tablet at bedtime as needed. 9.  Colace 100 mg one tablet twice a day p.r.n. for constipation. 10.  Melatonin 3 mg one tablet nightly. 11.  Keppra 500 mg one tablet twice a day. 12.  Zocor 20 mg one tablet daily. 13.  Claritin 10 mg one tablet daily. 14.  Biotin 1000 mcg daily. 15.  Protonix 40 mg one tablet daily. 16.  Zoloft 50 mg one tablet daily. 17.  Imdur 30 mg one tablet daily. 18.  Aspirin 81 mg one tablet daily. 19.  Phenergan 25 mg every six hours for nausea, vomiting p.r.n.  20.  Oyster Calcium with Vitamin D 500/200 one tablet twice a day.   21.  Centrum Silver multivitamins ambulation. .  The patient stayed in the  hospital for three days; and during her course, the patient's mentation  is back to baseline, feeling well. No pain. No shortness of breath. The patient was sent home with Cipro and Shima-Colace as the patient had  been constipated. The patient was discharged in improved and stable  condition with the family. CYNTHIA Currie M.D.    D: 11/27/2021 17:34:00       T: 11/27/2021 17:38:50     [email or web address]  Job#: [id number]     Doc#: [id number]    CC:

## 2021-12-01 ENCOUNTER — CARE COORDINATION (OUTPATIENT)
Dept: FAMILY MEDICINE CLINIC | Age: 86
End: 2021-12-01

## 2021-12-03 RX ORDER — SIMVASTATIN 20 MG
TABLET ORAL
Qty: 90 TABLET | Refills: 0 | Status: SHIPPED | OUTPATIENT
Start: 2021-12-03 | End: 2022-03-28

## 2021-12-03 NOTE — TELEPHONE ENCOUNTER
Date of last visit:  8/3/2021  Date of next visit:  Visit date not found    Requested Prescriptions     Pending Prescriptions Disp Refills    simvastatin (ZOCOR) 20 MG tablet 90 tablet 1     Sig: take 1 tablet by mouth every evening

## 2021-12-09 RX ORDER — SENNA AND DOCUSATE SODIUM 50; 8.6 MG/1; MG/1
1-2 TABLET, FILM COATED ORAL DAILY PRN
Qty: 60 TABLET | Refills: 1 | Status: SHIPPED | OUTPATIENT
Start: 2021-12-09 | End: 2022-03-18

## 2021-12-09 NOTE — TELEPHONE ENCOUNTER
Date of last visit:  9/3/2021   Date of next visit:  Visit date not found    Requested Prescriptions     Pending Prescriptions Disp Refills    sennosides-docusate sodium (SENOKOT-S) 8.6-50 MG tablet 180 tablet 1     Sig: Take 1-2 tablets by mouth daily as needed for Constipation

## 2021-12-09 NOTE — TELEPHONE ENCOUNTER
Latanya called requesting a refill of their:    sennosides-docusate sodium (SENOKOT-S) 8.6-50 MG tablet 2 tabs daily      Send 90 day supply on [facility]    PT HAS BEEN OUT FOR 3 WEEKS AND WOULD LIKE THIS TO BE CALLED IN ASAP

## 2021-12-10 DIAGNOSIS — F41.9 ANXIETY: ICD-10-CM

## 2021-12-10 DIAGNOSIS — C79.31 BREAST CANCER METASTASIZED TO BRAIN, RIGHT (HCC): ICD-10-CM

## 2021-12-10 DIAGNOSIS — C78.7 CARCINOMA OF BREAST METASTATIC TO LIVER, UNSPECIFIED LATERALITY (HCC): ICD-10-CM

## 2021-12-10 DIAGNOSIS — C50.919 CARCINOMA OF BREAST METASTATIC TO LIVER, UNSPECIFIED LATERALITY (HCC): ICD-10-CM

## 2021-12-10 DIAGNOSIS — C50.911 BREAST CANCER METASTASIZED TO BRAIN, RIGHT (HCC): ICD-10-CM

## 2021-12-10 RX ORDER — ALPRAZOLAM 0.25 MG/1
0.25 TABLET ORAL 2 TIMES DAILY PRN
Qty: 30 TABLET | Refills: 2 | Status: SHIPPED | OUTPATIENT
Start: 2021-12-10 | End: 2022-03-22 | Stop reason: SDUPTHER

## 2021-12-10 NOTE — TELEPHONE ENCOUNTER
[facility] called requesting Rx for Xanax.     [facility]

## 2021-12-10 NOTE — TELEPHONE ENCOUNTER
Date of last visit:  9/3/2021   Date of next visit:  Visit date not found    Requested Prescriptions     Pending Prescriptions Disp Refills    ALPRAZolam (XANAX) 0.25 MG tablet 30 tablet 2     Sig: Take 1 tablet by mouth 2 times daily as needed for Anxiety for up to 90 days.

## 2021-12-21 ENCOUNTER — HOSPITAL ENCOUNTER (OUTPATIENT)
Dept: WOMENS IMAGING | Age: 86
Discharge: HOME | End: 2021-12-21
Payer: MEDICARE

## 2021-12-21 DIAGNOSIS — Z12.31 VISIT FOR SCREENING MAMMOGRAM: ICD-10-CM

## 2021-12-21 PROCEDURE — 77063 BREAST TOMOSYNTHESIS BI: CPT

## 2022-01-01 ENCOUNTER — TELEPHONE (OUTPATIENT)
Dept: FAMILY MEDICINE CLINIC | Age: 87
End: 2022-01-01

## 2022-01-01 RX ORDER — LEVOTHYROXINE SODIUM 0.1 MG/1
TABLET ORAL
Qty: 30 TABLET | Refills: 5 | Status: SHIPPED | OUTPATIENT
Start: 2022-01-01

## 2022-01-21 ENCOUNTER — APPOINTMENT (OUTPATIENT)
Dept: GENERAL RADIOLOGY | Age: 87
DRG: 177 | End: 2022-01-21
Payer: MEDICARE

## 2022-01-21 ENCOUNTER — HOSPITAL ENCOUNTER (INPATIENT)
Age: 87
LOS: 4 days | Discharge: HOME HEALTH | DRG: 177 | End: 2022-01-27
Attending: EMERGENCY MEDICINE | Admitting: FAMILY MEDICINE
Payer: MEDICARE

## 2022-01-21 ENCOUNTER — APPOINTMENT (OUTPATIENT)
Dept: CT IMAGING | Age: 87
DRG: 177 | End: 2022-01-21
Payer: MEDICARE

## 2022-01-21 DIAGNOSIS — E86.0 DEHYDRATION: ICD-10-CM

## 2022-01-21 DIAGNOSIS — I95.9 HYPOTENSION, UNSPECIFIED HYPOTENSION TYPE: ICD-10-CM

## 2022-01-21 DIAGNOSIS — U07.1 COVID-19: Primary | ICD-10-CM

## 2022-01-21 PROBLEM — C50.919 METASTATIC BREAST CANCER (HCC): Status: ACTIVE | Noted: 2021-07-06

## 2022-01-21 LAB
ALBUMIN SERPL-MCNC: 3.9 G/DL (ref 3.5–5.1)
ALP BLD-CCNC: 48 U/L (ref 38–126)
ALT SERPL-CCNC: 15 U/L (ref 11–66)
ANION GAP SERPL CALCULATED.3IONS-SCNC: 8 MEQ/L (ref 8–16)
AST SERPL-CCNC: 22 U/L (ref 5–40)
BACTERIA: ABNORMAL /HPF
BASOPHILS # BLD: 0 %
BASOPHILS ABSOLUTE: 0 THOU/MM3 (ref 0–0.1)
BILIRUB SERPL-MCNC: 0.4 MG/DL (ref 0.3–1.2)
BILIRUBIN URINE: NEGATIVE
BLOOD, URINE: ABNORMAL
BUN BLDV-MCNC: 18 MG/DL (ref 7–22)
C-REACTIVE PROTEIN: 2.91 MG/DL (ref 0–1)
CALCIUM SERPL-MCNC: 8.8 MG/DL (ref 8.5–10.5)
CASTS 2: ABNORMAL /LPF
CASTS UA: ABNORMAL /LPF
CHARACTER, URINE: CLEAR
CHLORIDE BLD-SCNC: 103 MEQ/L (ref 98–111)
CO2: 25 MEQ/L (ref 23–33)
COLOR: YELLOW
CREAT SERPL-MCNC: 0.7 MG/DL (ref 0.4–1.2)
CRYSTALS, UA: ABNORMAL
D-DIMER QUANTITATIVE: 2096 NG/ML FEU (ref 0–500)
EKG ATRIAL RATE: 78 BPM
EKG P AXIS: -71 DEGREES
EKG P-R INTERVAL: 144 MS
EKG Q-T INTERVAL: 408 MS
EKG QRS DURATION: 78 MS
EKG QTC CALCULATION (BAZETT): 465 MS
EKG R AXIS: 20 DEGREES
EKG T AXIS: -176 DEGREES
EKG VENTRICULAR RATE: 78 BPM
EOSINOPHIL # BLD: 0.2 %
EOSINOPHILS ABSOLUTE: 0 THOU/MM3 (ref 0–0.4)
EPITHELIAL CELLS, UA: ABNORMAL /HPF
ERYTHROCYTE [DISTWIDTH] IN BLOOD BY AUTOMATED COUNT: 16.3 % (ref 11.5–14.5)
ERYTHROCYTE [DISTWIDTH] IN BLOOD BY AUTOMATED COUNT: 55.8 FL (ref 35–45)
FERRITIN: 73 NG/ML (ref 10–291)
FLU A ANTIGEN: NEGATIVE
FLU B ANTIGEN: NEGATIVE
GFR SERPL CREATININE-BSD FRML MDRD: 79 ML/MIN/1.73M2
GLUCOSE BLD-MCNC: 99 MG/DL (ref 70–108)
GLUCOSE URINE: NEGATIVE MG/DL
HCT VFR BLD CALC: 40.6 % (ref 37–47)
HEMOGLOBIN: 12.8 GM/DL (ref 12–16)
IMMATURE GRANS (ABS): 0.02 THOU/MM3 (ref 0–0.07)
IMMATURE GRANULOCYTES: 0.4 %
KETONES, URINE: NEGATIVE
LACTIC ACID, SEPSIS: 0.9 MMOL/L (ref 0.5–1.9)
LACTIC ACID, SEPSIS: 1.1 MMOL/L (ref 0.5–1.9)
LD: 155 U/L (ref 100–190)
LEUKOCYTE ESTERASE, URINE: NEGATIVE
LYMPHOCYTES # BLD: 23.3 %
LYMPHOCYTES ABSOLUTE: 1.3 THOU/MM3 (ref 1–4.8)
MAGNESIUM: 2.1 MG/DL (ref 1.6–2.4)
MCH RBC QN AUTO: 29.5 PG (ref 26–33)
MCHC RBC AUTO-ENTMCNC: 31.5 GM/DL (ref 32.2–35.5)
MCV RBC AUTO: 93.5 FL (ref 81–99)
MISCELLANEOUS 2: ABNORMAL
MONOCYTES # BLD: 14.2 %
MONOCYTES ABSOLUTE: 0.8 THOU/MM3 (ref 0.4–1.3)
NITRITE, URINE: NEGATIVE
NUCLEATED RED BLOOD CELLS: 0 /100 WBC
OSMOLALITY CALCULATION: 273.9 MOSMOL/KG (ref 275–300)
PH UA: 6 (ref 5–9)
PLATELET # BLD: 105 THOU/MM3 (ref 130–400)
PMV BLD AUTO: 11.8 FL (ref 9.4–12.4)
POTASSIUM SERPL-SCNC: 4.2 MEQ/L (ref 3.5–5.2)
PRO-BNP: 1210 PG/ML (ref 0–1800)
PROCALCITONIN: 0.06 NG/ML (ref 0.01–0.09)
PROTEIN UA: NEGATIVE
RBC # BLD: 4.34 MILL/MM3 (ref 4.2–5.4)
RBC URINE: ABNORMAL /HPF
RENAL EPITHELIAL, UA: ABNORMAL
SARS-COV-2, NAAT: DETECTED
SEG NEUTROPHILS: 61.9 %
SEGMENTED NEUTROPHILS ABSOLUTE COUNT: 3.3 THOU/MM3 (ref 1.8–7.7)
SODIUM BLD-SCNC: 136 MEQ/L (ref 135–145)
SPECIFIC GRAVITY, URINE: 1 (ref 1–1.03)
TOTAL PROTEIN: 6 G/DL (ref 6.1–8)
TROPONIN T: < 0.01 NG/ML
UROBILINOGEN, URINE: 0.2 EU/DL (ref 0–1)
WBC # BLD: 5.4 THOU/MM3 (ref 4.8–10.8)
WBC UA: ABNORMAL /HPF
YEAST: ABNORMAL

## 2022-01-21 PROCEDURE — 6360000004 HC RX CONTRAST MEDICATION: Performed by: EMERGENCY MEDICINE

## 2022-01-21 PROCEDURE — 87635 SARS-COV-2 COVID-19 AMP PRB: CPT

## 2022-01-21 PROCEDURE — 96361 HYDRATE IV INFUSION ADD-ON: CPT

## 2022-01-21 PROCEDURE — G0378 HOSPITAL OBSERVATION PER HR: HCPCS

## 2022-01-21 PROCEDURE — 71275 CT ANGIOGRAPHY CHEST: CPT

## 2022-01-21 PROCEDURE — 93005 ELECTROCARDIOGRAM TRACING: CPT | Performed by: EMERGENCY MEDICINE

## 2022-01-21 PROCEDURE — 36415 COLL VENOUS BLD VENIPUNCTURE: CPT

## 2022-01-21 PROCEDURE — 87040 BLOOD CULTURE FOR BACTERIA: CPT

## 2022-01-21 PROCEDURE — 96372 THER/PROPH/DIAG INJ SC/IM: CPT

## 2022-01-21 PROCEDURE — 84484 ASSAY OF TROPONIN QUANT: CPT

## 2022-01-21 PROCEDURE — 82728 ASSAY OF FERRITIN: CPT

## 2022-01-21 PROCEDURE — 83880 ASSAY OF NATRIURETIC PEPTIDE: CPT

## 2022-01-21 PROCEDURE — 93010 ELECTROCARDIOGRAM REPORT: CPT | Performed by: NUCLEAR MEDICINE

## 2022-01-21 PROCEDURE — 96360 HYDRATION IV INFUSION INIT: CPT

## 2022-01-21 PROCEDURE — 99285 EMERGENCY DEPT VISIT HI MDM: CPT

## 2022-01-21 PROCEDURE — 87804 INFLUENZA ASSAY W/OPTIC: CPT

## 2022-01-21 PROCEDURE — 84145 PROCALCITONIN (PCT): CPT

## 2022-01-21 PROCEDURE — 83605 ASSAY OF LACTIC ACID: CPT

## 2022-01-21 PROCEDURE — 81001 URINALYSIS AUTO W/SCOPE: CPT

## 2022-01-21 PROCEDURE — 83735 ASSAY OF MAGNESIUM: CPT

## 2022-01-21 PROCEDURE — 83615 LACTATE (LD) (LDH) ENZYME: CPT

## 2022-01-21 PROCEDURE — 85025 COMPLETE CBC W/AUTO DIFF WBC: CPT

## 2022-01-21 PROCEDURE — 71046 X-RAY EXAM CHEST 2 VIEWS: CPT

## 2022-01-21 PROCEDURE — 99223 1ST HOSP IP/OBS HIGH 75: CPT | Performed by: FAMILY MEDICINE

## 2022-01-21 PROCEDURE — 86140 C-REACTIVE PROTEIN: CPT

## 2022-01-21 PROCEDURE — 2580000003 HC RX 258: Performed by: EMERGENCY MEDICINE

## 2022-01-21 PROCEDURE — 80053 COMPREHEN METABOLIC PANEL: CPT

## 2022-01-21 PROCEDURE — 85379 FIBRIN DEGRADATION QUANT: CPT

## 2022-01-21 PROCEDURE — 2580000003 HC RX 258: Performed by: FAMILY MEDICINE

## 2022-01-21 PROCEDURE — 6360000002 HC RX W HCPCS: Performed by: FAMILY MEDICINE

## 2022-01-21 RX ORDER — ONDANSETRON 2 MG/ML
4 INJECTION INTRAMUSCULAR; INTRAVENOUS EVERY 6 HOURS PRN
Status: DISCONTINUED | OUTPATIENT
Start: 2022-01-21 | End: 2022-01-27 | Stop reason: HOSPADM

## 2022-01-21 RX ORDER — SODIUM CHLORIDE, SODIUM LACTATE, POTASSIUM CHLORIDE, AND CALCIUM CHLORIDE .6; .31; .03; .02 G/100ML; G/100ML; G/100ML; G/100ML
1000 INJECTION, SOLUTION INTRAVENOUS ONCE
Status: COMPLETED | OUTPATIENT
Start: 2022-01-21 | End: 2022-01-21

## 2022-01-21 RX ORDER — LEVOTHYROXINE SODIUM 0.1 MG/1
100 TABLET ORAL DAILY
Status: DISCONTINUED | OUTPATIENT
Start: 2022-01-22 | End: 2022-01-27 | Stop reason: HOSPADM

## 2022-01-21 RX ORDER — CETIRIZINE HYDROCHLORIDE 10 MG/1
5 TABLET ORAL DAILY
Status: DISCONTINUED | OUTPATIENT
Start: 2022-01-22 | End: 2022-01-27 | Stop reason: HOSPADM

## 2022-01-21 RX ORDER — POLYETHYLENE GLYCOL 3350 17 G/17G
17 POWDER, FOR SOLUTION ORAL DAILY PRN
Status: DISCONTINUED | OUTPATIENT
Start: 2022-01-21 | End: 2022-01-27 | Stop reason: HOSPADM

## 2022-01-21 RX ORDER — SENNA AND DOCUSATE SODIUM 50; 8.6 MG/1; MG/1
1 TABLET, FILM COATED ORAL DAILY PRN
Status: DISCONTINUED | OUTPATIENT
Start: 2022-01-21 | End: 2022-01-27 | Stop reason: HOSPADM

## 2022-01-21 RX ORDER — ACETAMINOPHEN 325 MG/1
650 TABLET ORAL EVERY 6 HOURS PRN
Status: DISCONTINUED | OUTPATIENT
Start: 2022-01-21 | End: 2022-01-27 | Stop reason: HOSPADM

## 2022-01-21 RX ORDER — SODIUM CHLORIDE 0.9 % (FLUSH) 0.9 %
5-40 SYRINGE (ML) INJECTION EVERY 12 HOURS SCHEDULED
Status: DISCONTINUED | OUTPATIENT
Start: 2022-01-21 | End: 2022-01-27 | Stop reason: HOSPADM

## 2022-01-21 RX ORDER — ONDANSETRON 4 MG/1
4 TABLET, ORALLY DISINTEGRATING ORAL EVERY 8 HOURS PRN
Status: DISCONTINUED | OUTPATIENT
Start: 2022-01-21 | End: 2022-01-27 | Stop reason: HOSPADM

## 2022-01-21 RX ORDER — ERGOCALCIFEROL 1.25 MG/1
50000 CAPSULE ORAL WEEKLY
Status: DISCONTINUED | OUTPATIENT
Start: 2022-01-21 | End: 2022-01-27 | Stop reason: HOSPADM

## 2022-01-21 RX ORDER — LANOLIN ALCOHOL/MO/W.PET/CERES
3 CREAM (GRAM) TOPICAL NIGHTLY PRN
Status: DISCONTINUED | OUTPATIENT
Start: 2022-01-22 | End: 2022-01-27 | Stop reason: HOSPADM

## 2022-01-21 RX ORDER — SODIUM CHLORIDE 0.9 % (FLUSH) 0.9 %
5-40 SYRINGE (ML) INJECTION PRN
Status: DISCONTINUED | OUTPATIENT
Start: 2022-01-21 | End: 2022-01-27 | Stop reason: HOSPADM

## 2022-01-21 RX ORDER — ZINC SULFATE 50(220)MG
50 CAPSULE ORAL DAILY
Status: DISCONTINUED | OUTPATIENT
Start: 2022-01-22 | End: 2022-01-27 | Stop reason: HOSPADM

## 2022-01-21 RX ORDER — 0.9 % SODIUM CHLORIDE 0.9 %
1000 INTRAVENOUS SOLUTION INTRAVENOUS ONCE
Status: DISCONTINUED | OUTPATIENT
Start: 2022-01-21 | End: 2022-01-21

## 2022-01-21 RX ORDER — DOCUSATE SODIUM 100 MG/1
100 CAPSULE, LIQUID FILLED ORAL 2 TIMES DAILY
Status: DISCONTINUED | OUTPATIENT
Start: 2022-01-21 | End: 2022-01-27 | Stop reason: HOSPADM

## 2022-01-21 RX ORDER — ATORVASTATIN CALCIUM 10 MG/1
10 TABLET, FILM COATED ORAL DAILY
Status: DISCONTINUED | OUTPATIENT
Start: 2022-01-22 | End: 2022-01-27 | Stop reason: HOSPADM

## 2022-01-21 RX ORDER — TRAZODONE HYDROCHLORIDE 50 MG/1
50 TABLET ORAL NIGHTLY PRN
Status: DISCONTINUED | OUTPATIENT
Start: 2022-01-21 | End: 2022-01-27 | Stop reason: HOSPADM

## 2022-01-21 RX ORDER — BIOTIN 1 MG
1000 TABLET ORAL DAILY
Status: DISCONTINUED | OUTPATIENT
Start: 2022-01-22 | End: 2022-01-22

## 2022-01-21 RX ORDER — ASPIRIN 81 MG/1
81 TABLET ORAL DAILY
Status: DISCONTINUED | OUTPATIENT
Start: 2022-01-22 | End: 2022-01-27 | Stop reason: HOSPADM

## 2022-01-21 RX ORDER — ASCORBIC ACID 500 MG
500 TABLET ORAL DAILY
Status: DISCONTINUED | OUTPATIENT
Start: 2022-01-22 | End: 2022-01-27 | Stop reason: HOSPADM

## 2022-01-21 RX ORDER — PREDNISONE 1 MG/1
1 TABLET ORAL DAILY
Status: DISCONTINUED | OUTPATIENT
Start: 2022-01-22 | End: 2022-01-27 | Stop reason: HOSPADM

## 2022-01-21 RX ORDER — SODIUM CHLORIDE 9 MG/ML
25 INJECTION, SOLUTION INTRAVENOUS PRN
Status: DISCONTINUED | OUTPATIENT
Start: 2022-01-21 | End: 2022-01-27 | Stop reason: HOSPADM

## 2022-01-21 RX ORDER — ACETAMINOPHEN 650 MG/1
650 SUPPOSITORY RECTAL EVERY 6 HOURS PRN
Status: DISCONTINUED | OUTPATIENT
Start: 2022-01-21 | End: 2022-01-27 | Stop reason: HOSPADM

## 2022-01-21 RX ORDER — LEVETIRACETAM 500 MG/1
500 TABLET ORAL 2 TIMES DAILY
Status: DISCONTINUED | OUTPATIENT
Start: 2022-01-21 | End: 2022-01-27 | Stop reason: HOSPADM

## 2022-01-21 RX ORDER — PANTOPRAZOLE SODIUM 40 MG/1
40 TABLET, DELAYED RELEASE ORAL
Status: DISCONTINUED | OUTPATIENT
Start: 2022-01-22 | End: 2022-01-27 | Stop reason: HOSPADM

## 2022-01-21 RX ORDER — GUAIFENESIN/DEXTROMETHORPHAN 100-10MG/5
5 SYRUP ORAL EVERY 4 HOURS PRN
Status: DISCONTINUED | OUTPATIENT
Start: 2022-01-21 | End: 2022-01-27 | Stop reason: HOSPADM

## 2022-01-21 RX ORDER — ALPRAZOLAM 0.25 MG/1
0.25 TABLET ORAL 2 TIMES DAILY PRN
Status: DISCONTINUED | OUTPATIENT
Start: 2022-01-21 | End: 2022-01-27 | Stop reason: HOSPADM

## 2022-01-21 RX ADMIN — ENOXAPARIN SODIUM 30 MG: 100 INJECTION SUBCUTANEOUS at 22:31

## 2022-01-21 RX ADMIN — SODIUM CHLORIDE, POTASSIUM CHLORIDE, SODIUM LACTATE AND CALCIUM CHLORIDE 1000 ML: 600; 310; 30; 20 INJECTION, SOLUTION INTRAVENOUS at 14:55

## 2022-01-21 RX ADMIN — IOPAMIDOL 80 ML: 755 INJECTION, SOLUTION INTRAVENOUS at 17:17

## 2022-01-21 RX ADMIN — SODIUM CHLORIDE, PRESERVATIVE FREE 10 ML: 5 INJECTION INTRAVENOUS at 22:34

## 2022-01-21 ASSESSMENT — ENCOUNTER SYMPTOMS
DIARRHEA: 0
ABDOMINAL PAIN: 0
CHOKING: 0
RHINORRHEA: 0
VOMITING: 0
CONSTIPATION: 0
SHORTNESS OF BREATH: 0
BACK PAIN: 0
NAUSEA: 0
STRIDOR: 0
TROUBLE SWALLOWING: 0
CHEST TIGHTNESS: 0
SORE THROAT: 0
COUGH: 0
EYES NEGATIVE: 1

## 2022-01-21 ASSESSMENT — PAIN SCALES - GENERAL: PAINLEVEL_OUTOF10: 0

## 2022-01-21 NOTE — ED PROVIDER NOTES
[address] [address]          Pt Name: Desmond Bowles  MRN: [id number]  [place] 5/29/1933  Date of evaluation: 1/21/2022  Treating Resident Physician: Maria A Pulliam MD  Supervising Physician: Grisel Stout MD.      [address] [place] [address]       Chief Complaint   Patient presents with    Concern For COVID-19     History obtained from the patient. HISTORY OF PRESENT ILLNESS    HPI  Desmond Bowles is a 80 y.o. female who presents to the emergency department for evaluation of hypotension, COVID-positive contact. Patient refers dry cough since yesterday night associated with chills, fever. Patient denies shortness of breath, no chest pain, no abdominal pain, no urinary symptoms, no headache. Patient on clinical records patient had a positive contact with COVID-19; today in the morning was found with low blood pressures. The patient has no other acute complaints at this time. REVIEW OF SYSTEMS   Review of Systems   Constitutional: Positive for chills and fever. Negative for activity change, appetite change, diaphoresis and fatigue. HENT: Negative for ear pain, rhinorrhea, sore throat and trouble swallowing. Eyes: Negative. Respiratory: Negative for cough, choking, chest tightness, shortness of breath and stridor. Cardiovascular: Negative for chest pain, palpitations and leg swelling. Gastrointestinal: Negative for abdominal pain, constipation, diarrhea, nausea and vomiting. Endocrine: Negative. Genitourinary: Negative for flank pain, hematuria and menstrual problem. Musculoskeletal: Negative for back pain, joint swelling and myalgias. Neurological: Negative for seizures, syncope, light-headedness, numbness and headaches. Psychiatric/Behavioral: Negative for confusion. The patient is not nervous/anxious.           PAST MEDICAL AND SURGICAL HISTORY     Past Medical History:   Diagnosis Date    Anxiety     Blind right eye     Breast cancer ([facility] 05/23/2016    IDC @ [facility] only chemo, no surgery    Breast cancer metastasized to liver (5/16) and brain (7/21) ([facility] 05/10/2016    Liver lesion noted 5/16 : Brain 7/21    Carotid stenosis      Left ICA 25%    Colostomy in place [facility]) 05/27/2016    Degenerative arthritis of cervical spine 05/2007    GERD (gastroesophageal reflux disease) 11/2006    History of craniotomy 07/2021    mets from breast CA    Hx antineoplastic chemo 2016    left breast cancer, no surgery    Hypercholesteremia     Hypoestrogenism     Hypothyroidism     Lumbago     Lumbosacral spinal stenosis 05/2019    MDRO (multiple drug resistant organisms) resistance 2008    pt stated cleared    Metastasis ([facility] 2019    from breast to brain    Personal history of cardiac dysrhythmia     Sigmoid diverticulosis 08/2004    Spondylolisthesis of lumbosacral region 08/2004    Steroid-induced hyperglycemia     Subclavian artery stenosis (HCC)     left    Superior and Inferior Pubic ramus fracture, right, closed, initial encounter ([facility] 05/21/2019    SVT (supraventricular tachycardia) ([facility] 06/2007    Temporal arteritis ([facility]     Vertebral artery occlusion     left    Vitamin D deficiency 06/2017     Past Surgical History:   Procedure Laterality Date    CARDIAC CATHETERIZATION  5/07    CATARACT REMOVAL  right 1/08; left 2/08    CHOLECYSTECTOMY  1/03    COLONOSCOPY  11/06    COLOSTOMY  09/04/2018    REVERSAL OF COLOSTOMY    CRANIOTOMY Right 7/2/2021    CRANIOTOMY FOR RESECTION/DEBULKING OF RIGHT SIDE INTRA BRAIN TUMOR performed by Roger Martins MD at [facility]      ENDOSCOPY, COLON, DIAGNOSTIC      EYE REMOVAL Right 03/2017    EYE SURGERY      EYE SURGERY Right 11/06/2017    Dr Amaya Carry in [address]      partial    OTHER SURGICAL HISTORY  05/27/2016    robotic assisted laparoscopic anterior colon resection and end colostomy    HI OFFICE/OUTPT VISIT,PROCEDURE ONLY N/A 9/4/2018    COLOSTOMY REVERSAL AND PARASTOMAL HERNIA REPAIR performed by Dave Gates MD at [address] [facility]  2002    UPPER GASTROINTESTINAL ENDOSCOPY  11/06    US BREAST NEEDLE BIOPSY LEFT Left 05/23/2016    IDC         MEDICATIONS   No current facility-administered medications for this encounter. Current Outpatient Medications:     ALPRAZolam (XANAX) 0.25 MG tablet, Take 1 tablet by mouth 2 times daily as needed for Anxiety for up to 90 days. , Disp: 30 tablet, Rfl: 2    sennosides-docusate sodium (SENOKOT-S) 8.6-50 MG tablet, Take 1-2 tablets by mouth daily as needed for Constipation, Disp: 60 tablet, Rfl: 1    simvastatin (ZOCOR) 20 MG tablet, take 1 tablet by mouth every evening, Disp: 90 tablet, Rfl: 0    levETIRAcetam (KEPPRA) 500 MG tablet, take 1 tablet by mouth twice a day, Disp: 240 tablet, Rfl: 2    hydrALAZINE (APRESOLINE) 25 MG tablet, take 1 tablet by mouth every 8 hours, Disp: 360 tablet, Rfl: 2    loratadine (CLARITIN) 10 MG tablet, take 1 tablet by mouth once daily, Disp: 90 tablet, Rfl: 1    predniSONE (DELTASONE) 1 MG tablet, Take 1 tablet by mouth daily, Disp: 60 tablet, Rfl: 2    levothyroxine (SYNTHROID) 100 MCG tablet, 1 tablet daily, Disp: 90 tablet, Rfl: 1    REFRESH PLUS 0.5 % SOLN ophthalmic solution, use as directed, Disp: , Rfl:     ondansetron (ZOFRAN-ODT) 4 MG disintegrating tablet, Take 4 mg by mouth every 8 hours as needed, Disp: , Rfl:     traZODone (DESYREL) 50 MG tablet, take 1 tablet by mouth at bedtime if needed for sleep, Disp: 30 tablet, Rfl: 5    lidocaine 4 % external patch, Apply 2 patches topically daily, Disp: 3 box, Rfl: 1    docusate sodium (COLACE, DULCOLAX) 100 MG CAPS, Take 100 mg by mouth 2 times daily, Disp: 60 capsule, Rfl: 1    melatonin 3 MG TABS tablet, Take 1 tablet by mouth nightly as needed (insomnia), Disp: 30 tablet, Rfl: 3    RA BIOTIN 1000 MCG TABS, Take 1,000 mcg by mouth daily , Disp: , Rfl:    Nutritional Supplements (ENSURE ORIGINAL) LIQD, Take 1 Can by mouth 2 times daily, Disp: 60 Bottle, Rfl: 5    pantoprazole (PROTONIX) 40 MG tablet, take 1 tablet by mouth once daily, Disp: 90 tablet, Rfl: 1    sertraline (ZOLOFT) 50 MG tablet, take 1 tablet by mouth twice a day, Disp: 180 tablet, Rfl: 1    isosorbide mononitrate (IMDUR) 30 MG extended release tablet, take 1 tablet by mouth once daily, Disp: 90 tablet, Rfl: 1    Calcium Carbonate-Vitamin D (OYSTER SHELL CALCIUM/D) 500-200 MG-UNIT TABS, take 1 tablet by mouth twice a day, Disp: 180 tablet, Rfl: 3    RA ASPIRIN EC ADULT LOW ST 81 MG EC tablet, take 1 tablet by mouth once daily, Disp: , Rfl:     vitamin D (ERGOCALCIFEROL) 1.25 MG (51832 UT) CAPS capsule, take 1 capsule by mouth every week, Disp: , Rfl:     promethazine (PHENERGAN) 25 MG tablet, Take 1 tablet by mouth every 6 hours as needed for Nausea (and vomiting), Disp: 12 tablet, Rfl: 0    Multiple Vitamins-Minerals (CENTRUM SILVER ADULT 50+) TABS, Take 1 tablet by mouth daily, Disp: 90 tablet, Rfl: 1      SOCIAL HISTORY     Social History     Social History Narrative    Not on file     Social History     Tobacco Use    Smoking status: Former Smoker     Packs/day: 0.50     Types: Cigarettes    Smokeless tobacco: Never Used   Vaping Use    Vaping Use: Former    Substances: Always   Substance Use Topics    Alcohol use: No    Drug use: No         ALLERGIES     Allergies   Allergen Reactions    Bactrim [Sulfamethoxazole-Trimethoprim] Swelling     Of tongue    Demerol Rash     nausea    Pcn [Penicillins] Rash         FAMILY HISTORY     Family History   Problem Relation Age of Onset    Breast Cancer Sister 61        breast    Cancer Brother 79        lung    Cancer Brother 68        colon    Cancer Son 48        lung         PREVIOUS RECORDS   Previous records reviewed: Previous medical history of breast cancer metastatic to liver/brain, last chemotherapy 3 weeks ago,.        PHYSICAL EXAM     ED Triage Vitals [01/21/22 1432]   BP Temp Temp Source Pulse Resp SpO2 Height Weight   (!) 95/49 99.5 °F (37.5 °C) Oral 77 16 92 % 5' (1.524 m) 130 lb (59 kg)     Initial vital signs and nursing assessment reviewed and abnormal from Hypotension. Body mass index is 25.39 kg/m². Pulsoximetry is abnormal per my interpretation. Additional Vital Signs:  Vitals:    01/21/22 1559   BP: 127/61   Pulse:    Resp:    Temp:    SpO2:        Physical Exam  Constitutional:       General: She is not in acute distress. Appearance: Normal appearance. She is not ill-appearing, toxic-appearing or diaphoretic. HENT:      Head: Normocephalic. Right Ear: Tympanic membrane, ear canal and external ear normal.      Left Ear: Tympanic membrane, ear canal and external ear normal.      Nose: Nose normal.      Mouth/Throat:      Mouth: Mucous membranes are dry. Eyes:      Extraocular Movements: Extraocular movements intact. Pupils: Pupils are equal, round, and reactive to light. Cardiovascular:      Rate and Rhythm: Normal rate and regular rhythm. Heart sounds: No murmur heard. No friction rub. No gallop. Pulmonary:      Effort: Pulmonary effort is normal. No respiratory distress. Breath sounds: Rhonchi (Bilateral) present. Abdominal:      General: Abdomen is flat. There is no distension. Palpations: Abdomen is soft. Tenderness: There is no abdominal tenderness. There is no guarding. Musculoskeletal:         General: No swelling or tenderness. Normal range of motion. Cervical back: Normal range of motion. No rigidity or tenderness. Skin:     General: Skin is warm. Capillary Refill: Capillary refill takes less than 2 seconds. Neurological:      General: No focal deficit present. Mental Status: She is alert and oriented to person, place, and time. Motor: No weakness.       Coordination: Coordination normal.             MEDICAL DECISION MAKING Initial Assessment:   80year-old patient, medical history of metastatic brain cancer, last chemotherapy 3 weeks ago, previous urinary tract infection, complaining of mild upper respiratory symptoms, with positive COVID-19 contact. Physical examination remarkable for hypotension, no tachycardia, oxygen saturations above 90%, bylateral mild rhonchi, will consider initial diagnosis COVID-19 infection associated with dehydration, however given history of previous UTI and recent chemotherapy other sources of infection will be ruled out; we Ringer's lactate achieving normal DPs. Reassessment  COVID-19 infection  Dehydration    Plan:    Ringer's IV bolus   COVID-19, chest x-ray, CBC, BMP, ferritin, procalcitonin, lactic acid, urinalysis   Reassessment        ED RESULTS   Laboratory results:  Labs Reviewed   COVID-19, RAPID - Abnormal; Notable for the following components:       Result Value    SARS-CoV-2, NAAT DETECTED (*)     All other components within normal limits   CBC WITH AUTO DIFFERENTIAL - Abnormal; Notable for the following components:    MCHC 31.5 (*)     RDW-CV 16.3 (*)     RDW-SD 55.8 (*)     Platelets 449 (*)     All other components within normal limits   COMPREHENSIVE METABOLIC PANEL - Abnormal; Notable for the following components:     Total Protein 6.0 (*)     All other components within normal limits   GLOMERULAR FILTRATION RATE, ESTIMATED - Abnormal; Notable for the following components:    Est, Glom Filt Rate 79 (*)     All other components within normal limits   OSMOLALITY - Abnormal; Notable for the following components:    Osmolality Calc 273.9 (*)     All other components within normal limits   D-DIMER, QUANTITATIVE - Abnormal; Notable for the following components:    D-Dimer, Quant 2096.00 (*)     All other components within normal limits   RAPID INFLUENZA A/B ANTIGENS   CULTURE, BLOOD 1   CULTURE, BLOOD 2   MAGNESIUM   LACTATE, SEPSIS   TROPONIN   BRAIN NATRIURETIC PEPTIDE   ANION GAP FERRITIN   LACTATE DEHYDROGENASE   PROCALCITONIN   URINE RT REFLEX TO CULTURE   LACTATE, SEPSIS       Radiologic studies results:  XR CHEST (2 VW)   Final Result   No acute intrathoracic process. **This report has been created using voice recognition software. It may contain minor errors which are inherent in voice recognition technology. **      Final report electronically signed by Dr Ole Rodriguez on 1/21/2022 3:43 PM      CTA Chest W WO Contrast    (Results Pending)       ED Medications administered this visit:   Medications   lactated ringers bolus (0 mLs IntraVENous Stopped 1/21/22 0788)         ED COURSE      Reassessment  80year-old patient, COVID-19 positive, upper respiratory symptoms, hypotension resolved after IV fluids, elevated D-dimer, no other acute inflammatory reactants were normal; consider PE/bacteremia due to immunosuppressed condition. Patient will be admitted for observation, further assessment. Cultures and CT chest scan is asked. MEDICATION CHANGES     New Prescriptions    No medications on file         FINAL DISPOSITION     Final diagnoses:   COVID-19   Dehydration   Hypotension, unspecified hypotension type     Condition: condition: good  Dispo: Admit to med/surg floor      This transcription was electronically signed. Parts of this transcriptions may have been dictated by use of voice recognition software and electronically transcribed, and parts may have been transcribed with the assistance of an ED scribe. The transcription may contain errors not detected in proofreading. Please refer to my supervising physician's documentation if my documentation differs.     Electronically Signed: Mario Beckham MD, 01/21/22, 5:00 PM       Mario Beckham MD  Resident  01/21/22 1700

## 2022-01-21 NOTE — ED PROVIDER NOTES
[address] [facility]  EMERGENCY MEDICINE ATTENDING ATTESTATION      Evaluation of Latanya Rod. Case discussed and care plan developed with resident physician. I agree with the resident physician documentation and plan as documented by him, except if my documentation differs. Patient seen, interviewed and examined by me. I reviewed the medical, surgical, family and social history, medications and allergies. I have reviewed the nursing documentation. Brief H&P   Patient c/o exposure to COVID-19. Patient felt lightheaded today with fever, cough since yesterday, visited today by her visiting nurse who found her blood pressure to be low. Physical exam is notable for chronically ill appearing elderly female, bilateral rales on lung auscultation, otherwise nonfocal exam.      Medical Decision Making   MDM:   1. Unexplained hypotension  2. Possible COVID19  3. Rule out other sources for infection  Plan:    IV line, labs   IV fluids   EKG   Imaging   Observation   Patient may need to be admitted    Please see the resident physician completed note for final disposition except as documented on this attestation. I have reviewed and interpreted all available lab, radiology and ekg results available at the moment. Diagnosis, treatment and disposition plans were discussed and agreed upon by patient. This transcription was electronically signed. It was dictated by use of voice recognition software and electronically transcribed. The transcription may contain errors not detected in proofreading.      I performed direct supervision and was present for the critical portion following procedures: None  Critical care time on this case: None    Electronically signed by Julien Segal MD on 1/21/22 at 3:39 PM HUGO Segal MD  01/21/22 7789

## 2022-01-21 NOTE — ED NOTES
Bed: 023A  Expected date: 1/21/22  Expected time: 2:21 PM  Means of arrival: Ambulance  Comments:  [facility] Greer Brittle, RN  01/21/22 9920

## 2022-01-21 NOTE — ED NOTES
RN order patient meal tray and provided with phone to call family. Pt respirations even and unlabored.      Dylon Jacob RN  01/21/22 4594

## 2022-01-21 NOTE — ED TRIAGE NOTES
Pt presents to the ED with complaints of concern for covid. Pt states she was at her brothers  and there was a covid outbreak. Pt states her visiting nurse went to check on her and noticed that blood pressure was low. Pt BP on arrival was 95/49. Pt states she doesn't have any COVID symptoms.

## 2022-01-21 NOTE — ED NOTES
Pt unable to urinate at this time. Pt respirations even and unlabored.      Hudson Au RN  01/21/22 3468

## 2022-01-21 NOTE — ED NOTES
RN assisted patient to the restroom via wheeclhair to obtain urine sample. Pt respirations even and unlabored.       America Noland RN  01/21/22 8035

## 2022-01-21 NOTE — H&P
Hospitalist History & Physical        Patient:  Jeremy Rehman  YOB: 1933    MRN: [id number]     Acct: [de-identified]    PCP: Ivonne Strickland MD    Date of Admission: 1/21/2022    Date of Service: Pt seen/examined on 01/21/22  and Admitted to Observation with expected LOS less than two midnights due to medical therapy. ASSESSMENT:    1. COVID-19 infection -- +exposure 1/14, sx started 1/20, unvaccinated --> ferritin 73, , D-dimer elevated 2096, CXR (-) for infiltrate, CTA chest (-) PE, bronchial wall thickening seen in lung bases can relate to infectious bronchiolitis or inflammatory process, very small right pleural effusion --> stable on RA in ER   --with advanced age and cancer with recent completion of chemo ~ 3 weeks ago, borderline BP will monitor for 24 hours and provide supportive care as does not qualify for any other COVID therapies at this time  --Add I-S, Acapella with bronchial wall thickening on CT  --Monitor respiratory status, O2 as needed  -- Add zinc, vitamin C, already takes vitamin D  2. Borderline hypotension --BP 95/49 upon arrival to the ER -patient uncertain what her BP was at home that home health nurse) with --she does note taking her home blood pressure medications today --she was given 1L IVF and BP up to 160/67--hold further IVF and hold home Imdur and hydralazine --monitor and adjust as needed  --Patient denies decreased p.o. intake or diarrhea thus less likely hypovolemia but possible with #1  -- no signs of sepsis - WBC WNL, LA normal 0.9, PCT (-), u/a (-), CTA chest (-) PE and no infiltrates, influenza   3. Pharyngitis --likely viral with #1 -Chloraseptic spray as needed  4.  Metastatic breast cancer with mets to liver, brain s/p debulking/craniotomy 7/2021 --follows with Dr. Ilya Randolph -- CTA (-) PE but with 2.7 x 1.6 cm soft tissue seen in the axillary tail of the right breast --> was noted to have left breast cancer s/p bx and right abn NOT noted on CT chest 7/2021 but her mammogram 12/21 noted a \"stable density demonstrated within the right breast\" --follow-up with Dr. Kami Villeda  --Sophia Sam for hx brain mets  5. Thrombocytopenia --105 on admission ->? Viral versus due to chemo --platelets 807 back in 11/2021--monitor, no signs of bleeding at this time  6. CAD --asymptomatic, troponin negative --hold home Imdur for borderline BP, continue ASA  7. Pulmonary nodules --2 mm RUL, 4 mm LLL per CTA chest -per prior CT 7/2021 noted the 4 mm left lung nodule dating back to 2016 and likely due to old granulomatous disease but did not mention the right -follow-up Dr. Kami Villeda  8. Essential hypertension--hold home Imdur, hydralazine with above -- monitor and adjust prn  9. History left subclavian artery stenosis with stenting -- cont home ASA --CTA chest 1/21 noted stent in proximal left subclavian artery with about 40-50% stenosis in the proximal left subclavian artery --caution with BPs  10. History of SVT  11. HLD --continue statin  12. History carotid stenosis  13. Anxiety --continue home as needed Xanax, Zoloft  14. GERD --continue home PPI  15. Hypothyroidism -- cont home Synthroid  16. History of temporal arteritis with right eye blindness -on chronic prednisone 1 mg daily at home -continue  17.  Code status -- DNR-CCA    Plan 1/21/2022   -- observation  -- supportive care for COVID  -- monitor BP - hold meds and IVF for now and monitor  -- monitor resp status as unvaccinated  -- PT/OT to maintain baseline functionality      Chief Complaint:  Low BP, dry cough, covid exposure      History Of Present Illness:      Amanda Villalba is a 80 y.o. female with metastatic breast cancer to brain, liver s/p craniotomy for brain mets, temporal arteritis with right eye blindness, CAD, hypothyroidism, anxiety, GERD, carotid stenosis, HLD, chronic back pain, subclavian artery stenosis left, SVT who presented to [address] [place] [address] sent in by Artis Rice RN with \"low blood pressure\", cough, +covid exposure, sore throat. Patient states she started with sore throat and phlegmy cough last night. She states that she went to her sister-in-law's  in [place] last Friday where she thinks she probably was exposed to [place]. She then started with a sore throat and feeling bad last night. She did not take anything over-the-counter for her sore throat. She states she has been able to eat and drink normally however. Today she started to feel little feverish. Her home health nurse visited and noted her BP to be \"low\" thus sent her in for evaluation knowing she was exposed to [place]. She has breast cancer and recently completed her chemotherapy about 3 weeks ago with Dr. Marsha Marrero.  She states she tolerated it well. She lives home alone and ambulates with a walker. She denies any falls, lightheaded or dizziness, syncope. She denies any chest pain. She states her cough is nonproductive but does describe it as \"phlegmy\" she has poor vision and is unable to say any definitive color to the sputum. She denies any myalgias. She has chronic leg pain at night. She denies any dysuria, hematuria, diarrhea, abdominal pain, nausea or vomiting. In ER, upon arrival her BP was 95/49, 92% on RA, temp 99.5, HR 77. CBC was unremarkable except for platelets of 831. CMP was WNL, magnesium 2.1, lactic acid 0.9, procalcitonin 0.06, , ferritin 73. Influenza negative, COVID positive, UA negative. CXR showed no acute process, CTA chest with no PE, 2.7 x 1.6 cm soft tissue seen in the axillary tail of the right breast, 2 mm right upper lobe pulmonary nodule, 4 mm LLL nodule, bronchiolar wall thickening seen with the lung bases could be infectious bronchiolitis or inflammatory process, very small right pleural effusion, mildly nodular liver surface, stent in the left proximal subclavian artery with 40-50% stenosis in the proximal left subclavian artery, aortocoronary calcifications mild emphysematous changes.   She was given a 1 L IVF LR bolus in ER and due to her borderline BP, COVID-positive status, and history hospitalist service was contacted for further evaluation and treatment.     Past Medical History:          Diagnosis Date    Anxiety     Blind right eye     Breast cancer ([address] 05/23/2016    [facility] @ [facility] only chemo, no surgery    Breast cancer metastasized to liver (5/16) and brain (7/21) ([address] 05/10/2016    Liver lesion noted 5/16 : Brain 7/21    Carotid stenosis      Left ICA 25%    Colostomy in place ([address] 05/27/2016    Degenerative arthritis of cervical spine 05/2007    GERD (gastroesophageal reflux disease) 11/2006    History of craniotomy 07/2021    mets from breast CA    Hx antineoplastic chemo 2016    left breast cancer, no surgery    Hypercholesteremia     Hypoestrogenism     Hypothyroidism     Lumbago     Lumbosacral spinal stenosis 05/2019    MDRO (multiple drug resistant organisms) resistance 2008    pt stated cleared    Metastasis ([address] 2019    from breast to brain    Personal history of cardiac dysrhythmia     Sigmoid diverticulosis 08/2004    Spondylolisthesis of lumbosacral region 08/2004    Steroid-induced hyperglycemia     Subclavian artery stenosis (HCC)     left    Superior and Inferior Pubic ramus fracture, right, closed, initial encounter ([address] 05/21/2019    SVT (supraventricular tachycardia) ([address] 06/2007    Temporal arteritis ([address]     Vertebral artery occlusion     left    Vitamin D deficiency 06/2017       Past Surgical History:          Procedure Laterality Date    CARDIAC CATHETERIZATION  5/07    CATARACT REMOVAL  right 1/08; left 2/08    CHOLECYSTECTOMY  1/03    COLONOSCOPY  11/06    COLOSTOMY  09/04/2018    REVERSAL OF COLOSTOMY    CRANIOTOMY Right 7/2/2021    CRANIOTOMY FOR RESECTION/DEBULKING OF RIGHT SIDE INTRA BRAIN TUMOR performed by Lorenza Bundy MD at [address]      ENDOSCOPY, COLON, DIAGNOSTIC      EYE REMOVAL Right patches topically daily 7/21/21   Eloisa Carrizales MD   docusate sodium (COLACE, DULCOLAX) 100 MG CAPS Take 100 mg by mouth 2 times daily 7/20/21   Eloisa Carrizales MD   melatonin 3 MG TABS tablet Take 1 tablet by mouth nightly as needed (insomnia) 7/20/21   Eloisa Carrizales MD   RA BIOTIN 1000 MCG TABS Take 1,000 mcg by mouth daily  2/22/21   Historical Provider, MD   Nutritional Supplements (ENSURE ORIGINAL) LIQD Take 1 Can by mouth 2 times daily 5/13/21   Ivonne Strickland MD   pantoprazole (PROTONIX) 40 MG tablet take 1 tablet by mouth once daily 4/20/21   Ivonne Strickland MD   sertraline (ZOLOFT) 50 MG tablet take 1 tablet by mouth twice a day 4/20/21   Ivonne Strickland MD   isosorbide mononitrate (IMDUR) 30 MG extended release tablet take 1 tablet by mouth once daily 3/29/21   Ivonne Strickland MD   Calcium Carbonate-Vitamin D (OYSTER SHELL CALCIUM/D) 500-200 MG-UNIT TABS take 1 tablet by mouth twice a day 3/10/21   Ivonne Strickland MD RA ASPIRIN EC ADULT LOW ST 81 MG EC tablet take 1 tablet by mouth once daily 10/13/20   Historical Provider, MD   vitamin D (ERGOCALCIFEROL) 1.25 MG (20031 UT) CAPS capsule take 1 capsule by mouth every week 7/5/20   Historical Provider, MD   promethazine (PHENERGAN) 25 MG tablet Take 1 tablet by mouth every 6 hours as needed for Nausea (and vomiting) 8/11/20   Ivonne Strickland MD   Multiple Vitamins-Minerals (CENTRUM SILVER ADULT 50+) TABS Take 1 tablet by mouth daily 12/7/15   Alissa Lopez MD       Allergies:  Bactrim [sulfamethoxazole-trimethoprim], Demerol, and Pcn [penicillins]    Social History:      The patient currently lives home alone with home health -uses a walker for ambulation    TOBACCO:   reports that she has quit smoking. Her smoking use included cigarettes. She smoked 0.50 packs per day. She has never used smokeless tobacco.  ETOH:   reports no history of alcohol use.       Family History:      Reviewed in detail and Positive as follows: Problem Relation Age of Onset    Breast Cancer Sister 61        breast    Cancer Brother 79        lung    Cancer Brother 68        colon    Cancer Son 48        lung         REVIEW OF SYSTEMS:   Pertinent positives as noted in the HPI. All other 14 systems reviewed and negative. PHYSICAL EXAM:    BP (!) 160/67   Pulse 64   Temp 99.5 °F (37.5 °C) (Oral)   Resp 18   Ht 5' (1.524 m)   Wt 130 lb (59 kg)   LMP  (LMP Unknown) Comment: age 52 ovaries intact  SpO2 94%   BMI 25.39 kg/m²     General appearance:  No apparent distress, appears stated age and cooperative. Oriented x 3. Non-toxic but chronically ill-appearing  HEENT:  Normal cephalic, atraumatic without obvious deformity. Right eye with dilated pupil, left eye with normal eye movements and reactive pupil. Extra ocular muscles intact. Conjunctivae/corneas clear. Mucous membranes slightly dry, no erythema or exudate of the oropharynx. Neck: Supple, with full range of motion. No jugular venous distention. Trachea midline. No lymphadenopathy  Respiratory:  Normal respiratory effort. Diminished in bases but otherwise clear to auscultation, bilaterally without Rales/Wheezes/Rhonchi. No distress or accessory muscle use. Cardiovascular:  Regular rate and rhythm with ectopy with normal S1/S2 without murmurs, rubs or gallops. Abdomen: Soft, non-tender, non-distended with normal bowel sounds. No rebound or guarding. Musculoskeletal:  No clubbing, cyanosis or edema bilaterally. Full range of motion without deformity. No calf TTP. Negative Homans' sign bilaterally  Skin: Skin color pale, normal texture, turgor normal.  No rashes or lesions. Neurologic:  Neurovascularly intact without any focal sensory/motor deficits. Cranial nerves: II-XII intact.   Psychiatric:  Alert and oriented, thought content appropriate, normal insight  Capillary Refill: Brisk,< 3 seconds   Peripheral Pulses: +palpable, equal bilaterally       Labs:     Recent Labs 01/21/22  1430   WBC 5.4   HGB 12.8   HCT 40.6   *     Recent Labs     01/21/22  1430      K 4.2      CO2 25   BUN 18   CREATININE 0.7   CALCIUM 8.8     Recent Labs     01/21/22  1430   AST 22   ALT 15   BILITOT 0.4   ALKPHOS 48     No results for input(s): INR in the last 72 hours. No results for input(s): [place] in the last 72 hours. Urinalysis:      Lab Results   Component Value Date    NITRU NEGATIVE 01/21/2022    WBCUA NONE SEEN 01/21/2022    BACTERIA NONE SEEN 01/21/2022    RBCUA 0-2 01/21/2022    BLOODU SMALL 01/21/2022    SPECGRAV 1.011 10/29/2021    GLUCOSEU NEGATIVE 01/21/2022           Radiology:     CTA Chest W WO Contrast   Final Result      1. No CT evidence of pulmonary embolus. 2. A 2.7 x 1.6 cm soft tissue seen in the axillary tail of the right breast. Correlate with mammography and physical exam.   3. 2 mm right upper lobe pulmonary nodule. 4 mm left lower lobe pulmonary nodule. 4. Bronchiolar wall thickening seen in the lung bases. This can relate to infectious bronchiolitis or inflammatory process. 5. Very small right pleural effusion. 6. Mildly nodular liver surface morphology is a nonspecific finding. It can be seen with chronic liver disease or cirrhosis. Clinical correlation is recommended. 7. Other findings as described above. **This report has been created using voice recognition software. It may contain minor errors which are inherent in voice recognition technology. **      Final report electronically signed by Dr Kassie Jackson on 1/21/2022 5:57 PM      XR CHEST (2 VW)   Final Result   No acute intrathoracic process. **This report has been created using voice recognition software. It may contain minor errors which are inherent in voice recognition technology. **      Final report electronically signed by Dr Myrna Ho on 1/21/2022 3:43 PM           EKG  - unavailable for review    DVT prophylaxis: lovenox    Code Status: DNR-CCA    PT/OT Eval Status: consult    Disposition:Home        Thank you Toyin Bond MD for the opportunity to be involved in this patient's care.     Electronically signed by Sandra Quinn MD on 1/21/2022 at 6:05 PM

## 2022-01-22 LAB
ANION GAP SERPL CALCULATED.3IONS-SCNC: 9 MEQ/L (ref 8–16)
BASOPHILS # BLD: 0.2 %
BASOPHILS ABSOLUTE: 0 THOU/MM3 (ref 0–0.1)
BUN BLDV-MCNC: 14 MG/DL (ref 7–22)
C-REACTIVE PROTEIN: 3.97 MG/DL (ref 0–1)
CALCIUM SERPL-MCNC: 8.3 MG/DL (ref 8.5–10.5)
CHLORIDE BLD-SCNC: 103 MEQ/L (ref 98–111)
CO2: 26 MEQ/L (ref 23–33)
CREAT SERPL-MCNC: 0.6 MG/DL (ref 0.4–1.2)
EOSINOPHIL # BLD: 0.2 %
EOSINOPHILS ABSOLUTE: 0 THOU/MM3 (ref 0–0.4)
ERYTHROCYTE [DISTWIDTH] IN BLOOD BY AUTOMATED COUNT: 16.3 % (ref 11.5–14.5)
ERYTHROCYTE [DISTWIDTH] IN BLOOD BY AUTOMATED COUNT: 55.5 FL (ref 35–45)
GFR SERPL CREATININE-BSD FRML MDRD: > 90 ML/MIN/1.73M2
GLUCOSE BLD-MCNC: 93 MG/DL (ref 70–108)
HCT VFR BLD CALC: 39.8 % (ref 37–47)
HEMOGLOBIN: 12.5 GM/DL (ref 12–16)
IMMATURE GRANS (ABS): 0.01 THOU/MM3 (ref 0–0.07)
IMMATURE GRANULOCYTES: 0.2 %
LYMPHOCYTES # BLD: 17.4 %
LYMPHOCYTES ABSOLUTE: 1.1 THOU/MM3 (ref 1–4.8)
MCH RBC QN AUTO: 29.1 PG (ref 26–33)
MCHC RBC AUTO-ENTMCNC: 31.4 GM/DL (ref 32.2–35.5)
MCV RBC AUTO: 92.8 FL (ref 81–99)
MONOCYTES # BLD: 11 %
MONOCYTES ABSOLUTE: 0.7 THOU/MM3 (ref 0.4–1.3)
NUCLEATED RED BLOOD CELLS: 0 /100 WBC
PLATELET # BLD: 98 THOU/MM3 (ref 130–400)
PMV BLD AUTO: 11.3 FL (ref 9.4–12.4)
POTASSIUM SERPL-SCNC: 3.6 MEQ/L (ref 3.5–5.2)
RBC # BLD: 4.29 MILL/MM3 (ref 4.2–5.4)
SEG NEUTROPHILS: 71 %
SEGMENTED NEUTROPHILS ABSOLUTE COUNT: 4.5 THOU/MM3 (ref 1.8–7.7)
SODIUM BLD-SCNC: 138 MEQ/L (ref 135–145)
WBC # BLD: 6.3 THOU/MM3 (ref 4.8–10.8)

## 2022-01-22 PROCEDURE — 6370000000 HC RX 637 (ALT 250 FOR IP): Performed by: FAMILY MEDICINE

## 2022-01-22 PROCEDURE — 86140 C-REACTIVE PROTEIN: CPT

## 2022-01-22 PROCEDURE — 85025 COMPLETE CBC W/AUTO DIFF WBC: CPT

## 2022-01-22 PROCEDURE — 6360000002 HC RX W HCPCS: Performed by: FAMILY MEDICINE

## 2022-01-22 PROCEDURE — 80048 BASIC METABOLIC PNL TOTAL CA: CPT

## 2022-01-22 PROCEDURE — 2580000003 HC RX 258: Performed by: FAMILY MEDICINE

## 2022-01-22 PROCEDURE — 36415 COLL VENOUS BLD VENIPUNCTURE: CPT

## 2022-01-22 PROCEDURE — G0378 HOSPITAL OBSERVATION PER HR: HCPCS

## 2022-01-22 PROCEDURE — 99226 PR SBSQ OBSERVATION CARE/DAY 35 MINUTES: CPT | Performed by: FAMILY MEDICINE

## 2022-01-22 PROCEDURE — 96372 THER/PROPH/DIAG INJ SC/IM: CPT

## 2022-01-22 PROCEDURE — 2580000003 HC RX 258: Performed by: PHYSICIAN ASSISTANT

## 2022-01-22 RX ORDER — 0.9 % SODIUM CHLORIDE 0.9 %
500 INTRAVENOUS SOLUTION INTRAVENOUS ONCE
Status: COMPLETED | OUTPATIENT
Start: 2022-01-22 | End: 2022-01-23

## 2022-01-22 RX ADMIN — PREDNISONE 1 MG: 1 TABLET ORAL at 10:13

## 2022-01-22 RX ADMIN — SODIUM CHLORIDE, PRESERVATIVE FREE 10 ML: 5 INJECTION INTRAVENOUS at 22:20

## 2022-01-22 RX ADMIN — LEVETIRACETAM 500 MG: 500 TABLET, FILM COATED ORAL at 20:53

## 2022-01-22 RX ADMIN — ASPIRIN 81 MG: 81 TABLET, COATED ORAL at 10:12

## 2022-01-22 RX ADMIN — SODIUM CHLORIDE 500 ML: 9 INJECTION, SOLUTION INTRAVENOUS at 22:19

## 2022-01-22 RX ADMIN — ACETAMINOPHEN 650 MG: 325 TABLET ORAL at 15:43

## 2022-01-22 RX ADMIN — ACETAMINOPHEN 650 MG: 325 TABLET ORAL at 03:55

## 2022-01-22 RX ADMIN — LEVETIRACETAM 500 MG: 500 TABLET, FILM COATED ORAL at 10:13

## 2022-01-22 RX ADMIN — SODIUM CHLORIDE, PRESERVATIVE FREE 10 ML: 5 INJECTION INTRAVENOUS at 10:43

## 2022-01-22 RX ADMIN — CETIRIZINE HYDROCHLORIDE 5 MG: 10 TABLET, FILM COATED ORAL at 10:12

## 2022-01-22 RX ADMIN — DOCUSATE SODIUM 100 MG: 100 CAPSULE ORAL at 20:53

## 2022-01-22 RX ADMIN — SERTRALINE 50 MG: 50 TABLET, FILM COATED ORAL at 20:53

## 2022-01-22 RX ADMIN — PHENOL 1 SPRAY: 1.5 LIQUID ORAL at 15:45

## 2022-01-22 RX ADMIN — LEVOTHYROXINE SODIUM 100 MCG: 0.1 TABLET ORAL at 05:07

## 2022-01-22 RX ADMIN — PANTOPRAZOLE SODIUM 40 MG: 40 TABLET, DELAYED RELEASE ORAL at 05:07

## 2022-01-22 RX ADMIN — OXYCODONE HYDROCHLORIDE AND ACETAMINOPHEN 500 MG: 500 TABLET ORAL at 10:13

## 2022-01-22 RX ADMIN — ATORVASTATIN CALCIUM 10 MG: 10 TABLET, FILM COATED ORAL at 20:53

## 2022-01-22 RX ADMIN — ENOXAPARIN SODIUM 30 MG: 100 INJECTION SUBCUTANEOUS at 22:20

## 2022-01-22 RX ADMIN — ENOXAPARIN SODIUM 30 MG: 100 INJECTION SUBCUTANEOUS at 10:12

## 2022-01-22 RX ADMIN — PHENOL 1 SPRAY: 1.5 LIQUID ORAL at 10:14

## 2022-01-22 RX ADMIN — DOCUSATE SODIUM 100 MG: 100 CAPSULE ORAL at 10:13

## 2022-01-22 RX ADMIN — Medication 50 MG: at 10:12

## 2022-01-22 ASSESSMENT — PAIN SCALES - GENERAL
PAINLEVEL_OUTOF10: 0

## 2022-01-22 NOTE — ED NOTES
ED nurse-to-nurse bedside report    Chief Complaint   Patient presents with    Concern For COVID-19      LOC: alert and orientated to name, place, date  Vital signs   Vitals:    01/21/22 1528 01/21/22 1559 01/21/22 1703 01/21/22 1824   BP: (!) 118/48 127/61 (!) 160/67 (!) 149/68   Pulse: 72  64 74   Resp: 18  18 16   Temp:       TempSrc:       SpO2: 93%  94% 94%   Weight:       Height:          Pain:    Pain Interventions: n/a  Pain Goal: 0  Oxygen: NO    Current needs required room air  Telemetry: Yes  LDAs:   Peripheral IV 01/21/22 Right Antecubital (Active)   Site Assessment Clean;Dry; Intact 01/21/22 1529   Line Status Infusing 01/21/22 1529   Dressing Status Clean;Dry; Intact 01/21/22 1529     Continuous Infusions:   Mobility: Requires assistance * 1  [place] Fall Risk Score: Fall Risk 3/9/2021 3/3/2020 3/3/2020 1/2/2020 9/27/2018 6/6/2017 12/1/2015   2 or more falls in past year? yes no no yes no no no   Fall with injury in past year? yes no no yes no no no     Fall Interventions: call light in reach  Report given to:  Brandie, [address] [place] [address]  01/21/22 1401

## 2022-01-22 NOTE — ED NOTES
Bedside Shift report received from Roseline Silva RN. Pt resting comfortable in bed, pt states their pain is a 0/10.  Pt is alert and oriented, respirations are equal and unlabored, pt denies needs at this time, will continue to monitor        Trung Wright RN  01/21/22 [facility]

## 2022-01-22 NOTE — PROGRESS NOTES
Hospitalist Progress Note      Patient:  Gabriel Morejon      Unit/Bed:6A-08/008-A    YOB: 1933    MRN: [id number]       Acct: [de-identified]     PCP: Isiah Montes De Oca MD    Date of Admission: 1/21/2022    Date/Time of Evaluation:  1/22/2022 at 8:20 AM    Assessment/Plan:    1. COVID-19 infection -- +exposure 1/14, sx started 1/20, unvaccinated --> on admission 1/21 = ferritin 73, , D-dimer elevated 2096, CXR (-) for infiltrate, CTA chest (-) PE, bronchial wall thickening seen in lung bases can relate to infectious bronchiolitis or inflammatory process, very small right pleural effusion --> stable on RA  thus does not qualify for additional tx and no monoclonal Ab available for infusion  -- CRP trending up 1/22 3.97 from 2.9 on 1/21 and lungs more rhonchorus, +fever 102 1/22 am thus ? ??progressing dz --> repeat CXR 1/23, trend CRP and monitor resp status closely  --Add I-S, Acapella with bronchial wall thickening on CT  --  O2 as needed  -- Add zinc, vitamin C, already takes vitamin D, APAP prn for fever. 2. Borderline hypotension --BP 95/49 upon arrival to the ER -patient uncertain what her BP was at home that made [facility] RN send her in --> s/p 1L IVF and BP up --hold further IVF and cont hold home Imdur and hydralazine --monitor and adjust as needed  --Patient denies decreased p.o. intake or diarrhea thus less likely hypovolemia but possible with #1  -- no signs of sepsis - WBC WNL, LA normal 0.9, PCT (-), u/a (-), CTA chest (-) PE and no infiltrates, influenza   3. Fever 1/22 -- due to #1 -- see above -- [facility] 1/21 (-) to date, u/a 1/21 (-), CTA chest 1/21 (-) infiltrates but with wall thickening lower bronchi -- repeat CXR 1/23 -- APAP prn fever. 4. Pharyngitis --likely viral with #1 -Chloraseptic spray as needed  5.  Metastatic breast cancer with mets to liver, brain s/p debulking/craniotomy 7/2021 --follows with Dr. Marsha Marrero -- CTA (-) PE but with 2.7 x 1.6 cm soft tissue seen in the axillary tail of the right breast --> was noted to have left breast cancer s/p bx and right abn NOT noted on CT chest 7/2021 but her mammogram 12/21 noted a \"stable density demonstrated within the right breast\" --follow-up with Dr. Kami Villeda  --Sophia Sam for hx brain mets  5. Thrombocytopenia --105 on admission 1/21 and 98 on 1/22  ->? Viral versus due to chemo but was done ~ 3 weeks ago ---platelets 280 back in 11/2021--monitor, no signs of bleeding at this time  6. CAD --asymptomatic, troponin negative --hold home Imdur for borderline BP, continue ASA - monitor  7. Pulmonary nodules --2 mm RUL, 4 mm LLL per CTA chest -per prior CT 7/2021 noted the 4 mm left lung nodule dating back to 2016 and likely due to old granulomatous disease but did not mention the right -follow-up Dr. Kami Villeda  8. Essential hypertension--hold home Imdur, hydralazine with above -- monitor and adjust prn  9. History left subclavian artery stenosis with stenting -- cont home ASA --CTA chest 1/21 noted stent in proximal left subclavian artery with about 40-50% stenosis in the proximal left subclavian artery --caution with BPs  10. History of SVT -- noted brief episode on tele 1/22 -- monitor  11. HLD --continue statin  12. History carotid stenosis  13. Anxiety --continue home as needed Xanax, Zoloft  14. GERD --continue home PPI  15. Hypothyroidism -- cont home Synthroid  16. History of temporal arteritis with right eye blindness -on chronic prednisone 1 mg daily at home -continue  17. Code status -- DNR-CCA    Dispo  -- 1/22/2022 -> pt with temp 102 earlier this am -> lungs with more rhonchi on right - concern for progressing COVID infection - CRP trending up - no available monoclonal antibody infusion. On RA yet and does not qualify for further tx. Monitor resp status closely, monitor temps, increase activity, encourage IS and acapella.   At high risk with recent completion of chemo for Breast cancer about 3 weeks ago of worsening and discussed this with pt thus cont to monitor another 24 hrs as pt lives home alone. Chief Complaint: low BP, sore throat, cough    Hospital Course: Gricelda Davies is a 80 y.o. female with metastatic breast cancer to brain, liver s/p craniotomy for brain mets, temporal arteritis with right eye blindness, CAD, hypothyroidism, anxiety, GERD, carotid stenosis, HLD, chronic back pain, subclavian artery stenosis left, SVT who presented to [facility] sent in by [place] RN with \"low blood pressure\", cough, +covid exposure, sore throat. In ER, upon arrival her BP was 95/49, 92% on RA, temp 99.5, HR 77. CBC was unremarkable except for platelets of 933. CMP was WNL, magnesium 2.1, lactic acid 0.9, procalcitonin 0.06, , ferritin 73. Influenza negative, COVID positive, UA negative. CXR showed no acute process, CTA chest with no PE, 2.7 x 1.6 cm soft tissue seen in the axillary tail of the right breast, 2 mm right upper lobe pulmonary nodule, 4 mm LLL nodule, bronchiolar wall thickening seen with the lung bases could be infectious bronchiolitis or inflammatory process, very small right pleural effusion, mildly nodular liver surface, stent in the left proximal subclavian artery with 40-50% stenosis in the proximal left subclavian artery, aortocoronary calcifications mild emphysematous changes. She was given a 1 L IVF LR bolus in ER and due to her borderline BP, COVID-positive status, and history hospitalist service was contacted for further evaluation and treatment. -- BP improved with IVF in ER - no further IVF given and BP stable;  Developed fever 102 1/22 am.      Subjective/HPI:   -- 1/22/2022  --> pt eating breakfast.  Her only c/o is sore throat and the \"phlegmy\" cough but she feels it's doing better. Had temp o 102 at 349 this am -- pt didn't feel like she had a fever. Denies cp, sob. Denies abd pain, n/v.  Janny po. On RA. Hasn't moved around much yet. She states she's ready to go home.       PMH, SURGICAL HX, FH, SOCIAL HX reviewed and updated as needed. Medications:  Reviewed    Infusion Medications    sodium chloride       Scheduled Medications    docusate sodium  100 mg Oral BID    levETIRAcetam  500 mg Oral BID    levothyroxine  100 mcg Oral Daily    cetirizine  5 mg Oral Daily    pantoprazole  40 mg Oral QAM AC    predniSONE  1 mg Oral Daily    aspirin  81 mg Oral Daily    sertraline  50 mg Oral BID    atorvastatin  10 mg Oral Daily    vitamin D  50,000 Units Oral Weekly    sodium chloride flush  5-40 mL IntraVENous 2 times per day    enoxaparin  30 mg SubCUTAneous Q12H    zinc sulfate  50 mg Oral Daily    ascorbic acid  500 mg Oral Daily     PRN Meds: ALPRAZolam, melatonin, sennosides-docusate sodium, traZODone, sodium chloride flush, sodium chloride, ondansetron **OR** ondansetron, polyethylene glycol, acetaminophen **OR** acetaminophen, guaiFENesin-dextromethorphan, phenol      Intake/Output Summary (Last 24 hours) at 1/22/2022 0820  Last data filed at 1/21/2022 2234  Gross per 24 hour   Intake 1130 ml   Output --   Net 1130 ml       Diet:  ADULT DIET; Regular    Exam:  BP (!) 154/51   Pulse 89   Temp 100 °F (37.8 °C) (Oral)   Resp 20   Ht 5' (1.524 m)   Wt 134 lb (60.8 kg)   LMP  (LMP Unknown) Comment: age 52 ovaries intact  SpO2 92%   BMI 26.17 kg/m²     General appearance: No apparent distress, appears stated age and cooperative. Oriented x 3. HEENT: Pupils equal, round, and reactive to light. Conjunctivae/corneas clear. MMM. Neck: Supple, with full range of motion. Trachea midline. Respiratory:  Normal respiratory effort. +rhonchi on right side >left, no wheezing. No respiratory distress or accessory muscle use. Cardiovascular: Regular rate and rhythm with normal S1/S2 without murmurs, rubs or gallops. No JVD. Abdomen: Soft, non-tender, non-distended with normal bowel sounds. No rebound or guarding  Musculoskeletal: No clubbing, cyanosis or edema bilaterally.   Full range report has been created using voice recognition software. It may contain minor errors which are inherent in voice recognition technology. **      Final report electronically signed by Dr Katya Renteria on 1/21/2022 5:57 PM      XR CHEST (2 VW)   Final Result   No acute intrathoracic process. **This report has been created using voice recognition software. It may contain minor errors which are inherent in voice recognition technology. **      Final report electronically signed by Dr Ronal Sam on 1/21/2022 3:43 PM          Diet: ADULT DIET;  Regular    Koenig: no    Microbiology:  Blood cx 1/21 (-) to date    Influenza 1/21 (-)     COVID 1/21 (+)    U/a 1/21 (-)    Tele review last 24 hrs:  SR - burst of SVT x 1 overnight    DVT prophylaxis: [x] Lovenox                                 [] SCDs                                 [] SQ Heparin                                 [] Encourage ambulation           [] Already on Anticoagulation     Disposition:    [x] Home with [facility]       [] TCU       [] Rehab       [] Psych       [] SNF       [] [facility]       [] Other-    Code Status: DNR-CCA    PT/OT Eval Status: consulted      Electronically signed by Richelle Ospina MD on 1/22/2022 at 8:20 AM

## 2022-01-23 ENCOUNTER — APPOINTMENT (OUTPATIENT)
Dept: GENERAL RADIOLOGY | Age: 87
DRG: 177 | End: 2022-01-23
Payer: MEDICARE

## 2022-01-23 PROBLEM — U07.1 COVID-19 VIRUS INFECTION: Status: ACTIVE | Noted: 2022-01-01

## 2022-01-23 LAB
ANION GAP SERPL CALCULATED.3IONS-SCNC: 11 MEQ/L (ref 8–16)
BUN BLDV-MCNC: 14 MG/DL (ref 7–22)
C-REACTIVE PROTEIN: 10.53 MG/DL (ref 0–1)
CALCIUM SERPL-MCNC: 7.5 MG/DL (ref 8.5–10.5)
CHLORIDE BLD-SCNC: 104 MEQ/L (ref 98–111)
CO2: 25 MEQ/L (ref 23–33)
CREAT SERPL-MCNC: 0.5 MG/DL (ref 0.4–1.2)
GFR SERPL CREATININE-BSD FRML MDRD: > 90 ML/MIN/1.73M2
GLUCOSE BLD-MCNC: 98 MG/DL (ref 70–108)
POTASSIUM SERPL-SCNC: 3.6 MEQ/L (ref 3.5–5.2)
SODIUM BLD-SCNC: 140 MEQ/L (ref 135–145)

## 2022-01-23 PROCEDURE — 80048 BASIC METABOLIC PNL TOTAL CA: CPT

## 2022-01-23 PROCEDURE — 6370000000 HC RX 637 (ALT 250 FOR IP): Performed by: FAMILY MEDICINE

## 2022-01-23 PROCEDURE — 6360000002 HC RX W HCPCS: Performed by: FAMILY MEDICINE

## 2022-01-23 PROCEDURE — 99233 SBSQ HOSP IP/OBS HIGH 50: CPT | Performed by: FAMILY MEDICINE

## 2022-01-23 PROCEDURE — 2580000003 HC RX 258: Performed by: FAMILY MEDICINE

## 2022-01-23 PROCEDURE — 36415 COLL VENOUS BLD VENIPUNCTURE: CPT

## 2022-01-23 PROCEDURE — 71045 X-RAY EXAM CHEST 1 VIEW: CPT

## 2022-01-23 PROCEDURE — 96372 THER/PROPH/DIAG INJ SC/IM: CPT

## 2022-01-23 PROCEDURE — 1200000000 HC SEMI PRIVATE

## 2022-01-23 PROCEDURE — 86140 C-REACTIVE PROTEIN: CPT

## 2022-01-23 RX ADMIN — CETIRIZINE HYDROCHLORIDE 5 MG: 10 TABLET, FILM COATED ORAL at 10:21

## 2022-01-23 RX ADMIN — PANTOPRAZOLE SODIUM 40 MG: 40 TABLET, DELAYED RELEASE ORAL at 05:22

## 2022-01-23 RX ADMIN — LEVETIRACETAM 500 MG: 500 TABLET, FILM COATED ORAL at 10:21

## 2022-01-23 RX ADMIN — SERTRALINE 50 MG: 50 TABLET, FILM COATED ORAL at 10:21

## 2022-01-23 RX ADMIN — LEVOTHYROXINE SODIUM 100 MCG: 0.1 TABLET ORAL at 05:22

## 2022-01-23 RX ADMIN — ALPRAZOLAM 0.25 MG: 0.25 TABLET ORAL at 21:06

## 2022-01-23 RX ADMIN — ALPRAZOLAM 0.25 MG: 0.25 TABLET ORAL at 10:40

## 2022-01-23 RX ADMIN — SODIUM CHLORIDE, PRESERVATIVE FREE 10 ML: 5 INJECTION INTRAVENOUS at 08:00

## 2022-01-23 RX ADMIN — PREDNISONE 1 MG: 1 TABLET ORAL at 10:21

## 2022-01-23 RX ADMIN — TRAZODONE HYDROCHLORIDE 50 MG: 50 TABLET ORAL at 21:06

## 2022-01-23 RX ADMIN — LEVETIRACETAM 500 MG: 500 TABLET, FILM COATED ORAL at 21:06

## 2022-01-23 RX ADMIN — SERTRALINE 50 MG: 50 TABLET, FILM COATED ORAL at 21:06

## 2022-01-23 RX ADMIN — DEXAMETHASONE 6 MG: 0.5 TABLET ORAL at 21:06

## 2022-01-23 RX ADMIN — ENOXAPARIN SODIUM 30 MG: 100 INJECTION SUBCUTANEOUS at 22:32

## 2022-01-23 RX ADMIN — OXYCODONE HYDROCHLORIDE AND ACETAMINOPHEN 500 MG: 500 TABLET ORAL at 10:21

## 2022-01-23 RX ADMIN — PANTOPRAZOLE SODIUM 40 MG: 40 TABLET, DELAYED RELEASE ORAL at 10:21

## 2022-01-23 RX ADMIN — ASPIRIN 81 MG: 81 TABLET, COATED ORAL at 10:21

## 2022-01-23 RX ADMIN — ATORVASTATIN CALCIUM 10 MG: 10 TABLET, FILM COATED ORAL at 21:06

## 2022-01-23 RX ADMIN — ENOXAPARIN SODIUM 30 MG: 100 INJECTION SUBCUTANEOUS at 10:20

## 2022-01-23 RX ADMIN — DOCUSATE SODIUM 100 MG: 100 CAPSULE ORAL at 10:21

## 2022-01-23 RX ADMIN — SODIUM CHLORIDE, PRESERVATIVE FREE 10 ML: 5 INJECTION INTRAVENOUS at 22:32

## 2022-01-23 RX ADMIN — TRAZODONE HYDROCHLORIDE 50 MG: 50 TABLET ORAL at 01:26

## 2022-01-23 RX ADMIN — Medication 50 MG: at 10:21

## 2022-01-23 RX ADMIN — ALPRAZOLAM 0.25 MG: 0.25 TABLET ORAL at 01:26

## 2022-01-23 RX ADMIN — PHENOL 1 SPRAY: 1.5 LIQUID ORAL at 10:40

## 2022-01-23 ASSESSMENT — PAIN SCALES - GENERAL
PAINLEVEL_OUTOF10: 0

## 2022-01-23 NOTE — PROGRESS NOTES
Hospitalist Progress Note      Patient:  Jillian Mauro      Unit/Bed:6A-08/008-A    YOB: 1933    MRN: [id number]       Acct: [de-identified]     PCP: Dinah Echols MD    Date of Admission: 1/21/2022    Date/Time of Evaluation:  1/23/2022 at 11:05 AM    Assessment/Plan:    1. COVID-19 infection -- +exposure 1/14, sx started 1/20, unvaccinated --> on admission 1/21 = ferritin 73, , D-dimer elevated 2096, CXR (-) for infiltrate, CTA chest (-) PE, bronchial wall thickening seen in lung bases can relate to infectious bronchiolitis or inflammatory process, very small right pleural effusion --> stable on RA on admission and O2 dropped to 86% on 1/22 -> O2 started 2L and weaned to RA 1/23 - high risk for further decompensation thus cont to monitor closely as CRP trending up 1/23   -- CRP trending up 1/23 to 10.53 from from 2.9 on 1/21 and lungs more rhonchorus, +fever 102 1/22 am thus likely progressing dz --> repeat CXR 1/23 still (-) however thus Started decadron 6 mg po daily 1/23 -->  trend CRP and monitor resp status closely  -- ?start baricitinib  -- IS, Acapella with bronchial wall thickening on CT  --  O2 as needed  -- Add zinc, vitamin C, already takes vitamin D, APAP prn for fever. 2. Acute hypoxic respiratory failure -- not POA - Due to #1 -- O2 down to 86% on RA 1/22 pm --> started on 2L O2 and weaned off 1/23 am - cont O2 as needed - increase activity and monitor O2  3. Borderline hypotension --BP 95/49 upon arrival to the ER -patient uncertain what her BP was at home that made [facility] RN send her in --> s/p 1L IVF and BP up --hold further IVF and cont hold home Imdur and hydralazine --monitor and adjust as needed  --Patient denies decreased p.o. intake or diarrhea thus less likely hypovolemia but possible with #1  -- no signs of sepsis - WBC WNL, LA normal 0.9, PCT (-), u/a (-), CTA chest (-) PE and no infiltrates, influenza   3.  Fever 1/22 -- due to #1 -- see above -- [facility] 1/21 (-) to date, u/a 1/21 (-), CTA chest 1/21 (-) infiltrates but with wall thickening lower bronchi -- repeat CXR 1/23 (-) but required O2 1/22 pm/1/23 am -  APAP prn fever. 4. Pharyngitis --likely viral with #1 -Chloraseptic spray as needed  5. Metastatic breast cancer with mets to liver, brain s/p debulking/craniotomy 7/2021 --follows with Dr. Jose Angel Joel -- CTA (-) PE but with 2.7 x 1.6 cm soft tissue seen in the axillary tail of the right breast --> was noted to have left breast cancer s/p bx and right abn NOT noted on CT chest 7/2021 but her mammogram 12/21 noted a \"stable density demonstrated within the right breast\" --follow-up with Dr. Jose Angel Joel  --Robin Giuseppe for hx brain mets  5. Thrombocytopenia --105 on admission 1/21 and 98 on 1/22  ->? Viral versus due to chemo but was done ~ 3 weeks ago ---platelets 909 back in 11/2021--monitor, no signs of bleeding at this time  6. CAD --asymptomatic, troponin negative --hold home Imdur for borderline BP, continue ASA - monitor  7. Pulmonary nodules --2 mm RUL, 4 mm LLL per CTA chest -per prior CT 7/2021 noted the 4 mm left lung nodule dating back to 2016 and likely due to old granulomatous disease but did not mention the right -follow-up Dr. Jose Angel Joel  8. Essential hypertension-- cont hold home Imdur, hydralazine with above -- monitor and adjust prn  9. History left subclavian artery stenosis with stenting -- cont home ASA --CTA chest 1/21 noted stent in proximal left subclavian artery with about 40-50% stenosis in the proximal left subclavian artery --caution with BPs  10. History of SVT -- noted brief episode on tele 1/22 -- monitor  11. HLD --continue statin  12. History carotid stenosis  13. Anxiety --continue home as needed Xanax, Zoloft  14. GERD --continue home PPI  15. Hypothyroidism -- cont home Synthroid  16. History of temporal arteritis with right eye blindness -on chronic prednisone 1 mg daily at home -continue  17.  Code status -- DNR-CCA    Dispo  -- 1/23/2022 -> pt with temp 103 yesterday and O2 down to 86% - on O2 last pm and weaned off so far today -> cont monitor closely today as At high risk for further decompensation but CXR (-) but CRP trending up -> start decadron 6 mg po daily - if cont to require O2 consider baricitinib.  acapella not in room and d/w RN and will encourage today. Monitor temps, resp status, BP, lytes, renal fxn and await PT/OT evals as pt lives alone with HH. Chief Complaint: low BP, sore throat, cough    Hospital Course: Lianet Lopez is a 80 y.o. female with metastatic breast cancer to brain, liver s/p craniotomy for brain mets, temporal arteritis with right eye blindness, CAD, hypothyroidism, anxiety, GERD, carotid stenosis, HLD, chronic back pain, subclavian artery stenosis left, SVT who presented to [facility] sent in by [place] RN with \"low blood pressure\", cough, +covid exposure, sore throat. In ER, upon arrival her BP was 95/49, 92% on RA, temp 99.5, HR 77. CBC was unremarkable except for platelets of 687. CMP was WNL, magnesium 2.1, lactic acid 0.9, procalcitonin 0.06, , ferritin 73. Influenza negative, COVID positive, UA negative. CXR showed no acute process, CTA chest with no PE, 2.7 x 1.6 cm soft tissue seen in the axillary tail of the right breast, 2 mm right upper lobe pulmonary nodule, 4 mm LLL nodule, bronchiolar wall thickening seen with the lung bases could be infectious bronchiolitis or inflammatory process, very small right pleural effusion, mildly nodular liver surface, stent in the left proximal subclavian artery with 40-50% stenosis in the proximal left subclavian artery, aortocoronary calcifications mild emphysematous changes. She was given a 1 L IVF LR bolus in ER and due to her borderline BP, COVID-positive status, and history hospitalist service was contacted for further evaluation and treatment.   -- BP improved with IVF in ER - no further IVF given and BP stable;  Developed fever 102 1/22 am. Subjective/HPI:   -- 1/23/2022  --> pt states she is feeling little better. Had temp up to 103 yesterday afternoon and is improving this am.  O2 down to 86% last pm and started on 2L O2 overnight - weaned off this am so far. States she is little weak. Still with cough with +production. Denies feeling shortness of breath. Denies cp. Denies abd pain, n/v.  Janny po. Sore throat is improving. Hasn't moved around much yet. PMH, SURGICAL HX, FH, SOCIAL HX reviewed and updated as needed. Medications:  Reviewed    Infusion Medications    sodium chloride       Scheduled Medications    docusate sodium  100 mg Oral BID    levETIRAcetam  500 mg Oral BID    levothyroxine  100 mcg Oral Daily    cetirizine  5 mg Oral Daily    pantoprazole  40 mg Oral QAM AC    predniSONE  1 mg Oral Daily    aspirin  81 mg Oral Daily    sertraline  50 mg Oral BID    atorvastatin  10 mg Oral Daily    vitamin D  50,000 Units Oral Weekly    sodium chloride flush  5-40 mL IntraVENous 2 times per day    enoxaparin  30 mg SubCUTAneous Q12H    zinc sulfate  50 mg Oral Daily    ascorbic acid  500 mg Oral Daily     PRN Meds: ALPRAZolam, melatonin, sennosides-docusate sodium, traZODone, sodium chloride flush, sodium chloride, ondansetron **OR** ondansetron, polyethylene glycol, acetaminophen **OR** acetaminophen, guaiFENesin-dextromethorphan, phenol      Intake/Output Summary (Last 24 hours) at 1/23/2022 1105  Last data filed at 1/22/2022 1127  Gross per 24 hour   Intake 240 ml   Output --   Net 240 ml       Diet:  ADULT DIET; Regular    Exam:  BP (!) 99/36   Pulse 76   Temp 98.4 °F (36.9 °C) (Oral)   Resp 16   Ht 5' (1.524 m)   Wt 134 lb (60.8 kg)   LMP  (LMP Unknown) Comment: age 52 ovaries intact  SpO2 92%   BMI 26.17 kg/m²     General appearance: No apparent distress, appears stated age and cooperative. Oriented x 3, looks sicker today  HEENT: Pupils equal, round, and reactive to light.  Conjunctivae/corneas clear.  MMM. Neck: Supple, with full range of motion. Trachea midline. Respiratory:  Normal respiratory effort. +rhonchi on right side >left, no wheezing. No respiratory distress or accessory muscle use. Cardiovascular: Regular rate and rhythm with normal S1/S2 without murmurs, rubs or gallops. No JVD. Abdomen: Soft, non-tender, non-distended with normal bowel sounds. No rebound or guarding  Musculoskeletal: No clubbing, cyanosis or edema bilaterally. Full range of motion without deformity. No calf tenderness palpation  Skin: Skin color, texture, turgor normal.  No rashes or lesions. Neurologic:  Neurovascularly intact without any focal sensory/motor deficits. Cranial nerves: II-XII intact, grossly non-focal.  Psychiatric: Alert and oriented, thought content appropriate, normal insight  Capillary Refill: Brisk,< 3 seconds   Peripheral Pulses: +2 palpable, equal bilaterally       All labs reviewed and interpreted by me:  Labs:   Recent Labs     01/21/22  1430 01/22/22  0652   WBC 5.4 6.3   HGB 12.8 12.5   HCT 40.6 39.8   * 98*     Recent Labs     01/21/22  1430 01/22/22  0652 01/23/22  0748    138 140   K 4.2 3.6 3.6    103 104   CO2 25 26 25   BUN 18 14 14   CREATININE 0.7 0.6 0.5   CALCIUM 8.8 8.3* 7.5*     Recent Labs     01/21/22  1430   AST 22   ALT 15   BILITOT 0.4   ALKPHOS 48     No results for input(s): INR in the last 72 hours. Recent Labs     01/21/22  1516   TROPONINT < 0.010       Urinalysis:      Lab Results   Component Value Date    NITRU NEGATIVE 01/21/2022    WBCUA NONE SEEN 01/21/2022    BACTERIA NONE SEEN 01/21/2022    RBCUA 0-2 01/21/2022    BLOODU SMALL 01/21/2022    SPECGRAV 1.011 10/29/2021    GLUCOSEU NEGATIVE 01/21/2022       All radiology images and reports reviewed and interpreted by me:  Radiology:  XR CHEST PORTABLE   Final Result   Impression:   No acute cardiopulmonary pathology.       This document has been electronically signed by: Grace Bowens MD on    01/23/2022 04:19 AM      CTA Chest W WO Contrast   Final Result      1. No CT evidence of pulmonary embolus. 2. A 2.7 x 1.6 cm soft tissue seen in the axillary tail of the right breast. Correlate with mammography and physical exam.   3. 2 mm right upper lobe pulmonary nodule. 4 mm left lower lobe pulmonary nodule. 4. Bronchiolar wall thickening seen in the lung bases. This can relate to infectious bronchiolitis or inflammatory process. 5. Very small right pleural effusion. 6. Mildly nodular liver surface morphology is a nonspecific finding. It can be seen with chronic liver disease or cirrhosis. Clinical correlation is recommended. 7. Other findings as described above. **This report has been created using voice recognition software. It may contain minor errors which are inherent in voice recognition technology. **      Final report electronically signed by Dr Reyes Panning on 1/21/2022 5:57 PM      XR CHEST (2 VW)   Final Result   No acute intrathoracic process. **This report has been created using voice recognition software. It may contain minor errors which are inherent in voice recognition technology. **      Final report electronically signed by Dr Mary Cortes on 1/21/2022 3:43 PM          Diet: ADULT DIET;  Regular    Koenig: no    Microbiology:  Blood cx 1/21 (-) to date    Influenza 1/21 (-)     COVID 1/21 (+)    U/a 1/21 (-)    Tele review last 24 hrs:  SR    DVT prophylaxis: [x] Lovenox                                 [] SCDs                                 [] SQ Heparin                                 [] Encourage ambulation           [] Already on Anticoagulation     Disposition:    [x] Home with [facility]       [] TCU       [] Rehab       [] Psych       [] SNF       [] [place]       [] Other-    Code Status: DNR-CCA    PT/OT Eval Status: consulted      Electronically signed by Mariela Fernandez MD on 1/23/2022 at 11:05 AM when pt evaluated - final note filed

## 2022-01-23 NOTE — PROGRESS NOTES
Patient states she would like point of contact to be Bernadette. This RN update Bernadette via telephone. All questions answered. Bernadette states she does not get up at 0600 and would like to only have nightly updates.

## 2022-01-24 LAB
ANION GAP SERPL CALCULATED.3IONS-SCNC: 9 MEQ/L (ref 8–16)
BASOPHILS # BLD: 0 %
BASOPHILS ABSOLUTE: 0 THOU/MM3 (ref 0–0.1)
BUN BLDV-MCNC: 15 MG/DL (ref 7–22)
C-REACTIVE PROTEIN: 11.35 MG/DL (ref 0–1)
CALCIUM SERPL-MCNC: 8.3 MG/DL (ref 8.5–10.5)
CHLORIDE BLD-SCNC: 104 MEQ/L (ref 98–111)
CO2: 26 MEQ/L (ref 23–33)
CREAT SERPL-MCNC: 0.5 MG/DL (ref 0.4–1.2)
EOSINOPHIL # BLD: 0 %
EOSINOPHILS ABSOLUTE: 0 THOU/MM3 (ref 0–0.4)
ERYTHROCYTE [DISTWIDTH] IN BLOOD BY AUTOMATED COUNT: 16.1 % (ref 11.5–14.5)
ERYTHROCYTE [DISTWIDTH] IN BLOOD BY AUTOMATED COUNT: 54.7 FL (ref 35–45)
GFR SERPL CREATININE-BSD FRML MDRD: > 90 ML/MIN/1.73M2
GLUCOSE BLD-MCNC: 163 MG/DL (ref 70–108)
HCT VFR BLD CALC: 46.3 % (ref 37–47)
HEMOGLOBIN: 14.9 GM/DL (ref 12–16)
IMMATURE GRANS (ABS): 0.03 THOU/MM3 (ref 0–0.07)
IMMATURE GRANULOCYTES: 0.7 %
LYMPHOCYTES # BLD: 17.5 %
LYMPHOCYTES ABSOLUTE: 0.8 THOU/MM3 (ref 1–4.8)
MAGNESIUM: 2.2 MG/DL (ref 1.6–2.4)
MCH RBC QN AUTO: 29.8 PG (ref 26–33)
MCHC RBC AUTO-ENTMCNC: 32.2 GM/DL (ref 32.2–35.5)
MCV RBC AUTO: 92.6 FL (ref 81–99)
MONOCYTES # BLD: 2.8 %
MONOCYTES ABSOLUTE: 0.1 THOU/MM3 (ref 0.4–1.3)
NUCLEATED RED BLOOD CELLS: 0 /100 WBC
PLATELET # BLD: 92 THOU/MM3 (ref 130–400)
PMV BLD AUTO: 11.8 FL (ref 9.4–12.4)
POTASSIUM SERPL-SCNC: 4.4 MEQ/L (ref 3.5–5.2)
RBC # BLD: 5 MILL/MM3 (ref 4.2–5.4)
SEG NEUTROPHILS: 79 %
SEGMENTED NEUTROPHILS ABSOLUTE COUNT: 3.6 THOU/MM3 (ref 1.8–7.7)
SODIUM BLD-SCNC: 139 MEQ/L (ref 135–145)
WBC # BLD: 4.6 THOU/MM3 (ref 4.8–10.8)

## 2022-01-24 PROCEDURE — 97535 SELF CARE MNGMENT TRAINING: CPT

## 2022-01-24 PROCEDURE — 6370000000 HC RX 637 (ALT 250 FOR IP): Performed by: FAMILY MEDICINE

## 2022-01-24 PROCEDURE — 80048 BASIC METABOLIC PNL TOTAL CA: CPT

## 2022-01-24 PROCEDURE — 97166 OT EVAL MOD COMPLEX 45 MIN: CPT

## 2022-01-24 PROCEDURE — 1200000000 HC SEMI PRIVATE

## 2022-01-24 PROCEDURE — 85025 COMPLETE CBC W/AUTO DIFF WBC: CPT

## 2022-01-24 PROCEDURE — 97530 THERAPEUTIC ACTIVITIES: CPT

## 2022-01-24 PROCEDURE — 94669 MECHANICAL CHEST WALL OSCILL: CPT

## 2022-01-24 PROCEDURE — 99232 SBSQ HOSP IP/OBS MODERATE 35: CPT | Performed by: FAMILY MEDICINE

## 2022-01-24 PROCEDURE — 2580000003 HC RX 258: Performed by: FAMILY MEDICINE

## 2022-01-24 PROCEDURE — 83735 ASSAY OF MAGNESIUM: CPT

## 2022-01-24 PROCEDURE — 6360000002 HC RX W HCPCS: Performed by: FAMILY MEDICINE

## 2022-01-24 PROCEDURE — 86140 C-REACTIVE PROTEIN: CPT

## 2022-01-24 PROCEDURE — 36415 COLL VENOUS BLD VENIPUNCTURE: CPT

## 2022-01-24 PROCEDURE — 97162 PT EVAL MOD COMPLEX 30 MIN: CPT

## 2022-01-24 RX ORDER — ALBUTEROL SULFATE 90 UG/1
2 AEROSOL, METERED RESPIRATORY (INHALATION) EVERY 6 HOURS PRN
Status: DISCONTINUED | OUTPATIENT
Start: 2022-01-24 | End: 2022-01-27 | Stop reason: HOSPADM

## 2022-01-24 RX ADMIN — SODIUM CHLORIDE, PRESERVATIVE FREE 10 ML: 5 INJECTION INTRAVENOUS at 20:46

## 2022-01-24 RX ADMIN — ATORVASTATIN CALCIUM 10 MG: 10 TABLET, FILM COATED ORAL at 20:46

## 2022-01-24 RX ADMIN — LEVETIRACETAM 500 MG: 500 TABLET, FILM COATED ORAL at 20:46

## 2022-01-24 RX ADMIN — ENOXAPARIN SODIUM 30 MG: 100 INJECTION SUBCUTANEOUS at 22:16

## 2022-01-24 RX ADMIN — SODIUM CHLORIDE, PRESERVATIVE FREE 10 ML: 5 INJECTION INTRAVENOUS at 10:31

## 2022-01-24 RX ADMIN — OXYCODONE HYDROCHLORIDE AND ACETAMINOPHEN 500 MG: 500 TABLET ORAL at 07:50

## 2022-01-24 RX ADMIN — DOCUSATE SODIUM 100 MG: 100 CAPSULE ORAL at 07:50

## 2022-01-24 RX ADMIN — TRAZODONE HYDROCHLORIDE 50 MG: 50 TABLET ORAL at 20:46

## 2022-01-24 RX ADMIN — LEVOTHYROXINE SODIUM 100 MCG: 0.1 TABLET ORAL at 07:47

## 2022-01-24 RX ADMIN — SERTRALINE 50 MG: 50 TABLET, FILM COATED ORAL at 20:46

## 2022-01-24 RX ADMIN — DEXAMETHASONE 6 MG: 0.5 TABLET ORAL at 07:50

## 2022-01-24 RX ADMIN — CETIRIZINE HYDROCHLORIDE 5 MG: 10 TABLET, FILM COATED ORAL at 07:50

## 2022-01-24 RX ADMIN — Medication 50 MG: at 07:51

## 2022-01-24 RX ADMIN — SERTRALINE 50 MG: 50 TABLET, FILM COATED ORAL at 07:50

## 2022-01-24 RX ADMIN — ASPIRIN 81 MG: 81 TABLET, COATED ORAL at 07:50

## 2022-01-24 RX ADMIN — PANTOPRAZOLE SODIUM 40 MG: 40 TABLET, DELAYED RELEASE ORAL at 07:47

## 2022-01-24 RX ADMIN — ENOXAPARIN SODIUM 30 MG: 100 INJECTION SUBCUTANEOUS at 10:32

## 2022-01-24 RX ADMIN — ALPRAZOLAM 0.25 MG: 0.25 TABLET ORAL at 20:46

## 2022-01-24 RX ADMIN — LEVETIRACETAM 500 MG: 500 TABLET, FILM COATED ORAL at 07:50

## 2022-01-24 ASSESSMENT — PAIN SCALES - GENERAL
PAINLEVEL_OUTOF10: 0

## 2022-01-24 NOTE — CARE COORDINATION
DISCHARGE/PLANNING EVALUATION  1/24/22, 2:51 PM EST    Reason for Referral: current with [facility]. Mental Status: Alert and oriented. Decision Making: makes own decisions. Family/Social/Home Environment: Assessment completed with pt, states she lives alone in a one story home and uses her walker to get around. Pt states she her niece and nephew are her support system. Pt states she plans to return home at discharge and her niece will be staying with her. Pt states she would like to continue her in home services, [facility], delivered meals and aide. Pt denies wanting therapy. Current Services including food security, transportation and housekeeping:  Current with [facility]. Current Equipment: pt uses walker in the home, has w/c, shower chair, ERS system, bedside commode if needed. Payment Source:Medicare and Medicaid. Concerns or Barriers to Discharge: pt denies barriers. Post acute provider list with quality measures, geographic area and applicable managed care information provided. Questions regarding selection process answered: Current with [facility]    Teach Back Method used with pt regarding care plan and discharge planner. Patient verbalized understanding of the plan of care and contribute to goal setting. Patient goals, treatment preferences and discharge plan: Pt planning home as PTA and her niece will be staying with her. [facility] provided skilled nursing 1 x a week and an Aide M-F, 2 hrs a day. [employer] also provides transportation when needed. Pt denies wanting IPR, SNF or DME. ALEX spoke with Gabriela Kim from [employer] who is covering for pts on going [place] [phone], update provided.      Electronically signed by MIGEL Martinez on 1/24/2022 at 2:51 PM

## 2022-01-24 NOTE — PROGRESS NOTES
[address] [place] [address]  INPATIENT PHYSICAL THERAPY  EVALUATION  [facility] 6A CAPACITY EBOLA - [id number]    Time In: 4075  Time Out: 1421  Timed Code Treatment Minutes: 32 Minutes  Minutes: 42          Date: 2022  Patient Name: Damion Calvin,  Gender:  female        MRN: [id number]  : 1933  (80 y.o.)      Referring Practitioner: Rosa Mc MD  Diagnosis: dehydration  Additional Pertinent Hx: Per EMR \"Latanya Jefferson is a 80 y.o. 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 Patient states she started with sore throat and phlegmy cough last night. She states that she went to her sister-in-law's  in [place] last Friday where she thinks she probably was exposed to [employer]. She then started with a sore throat and feeling bad last night. She did not take anything over-the-counter for her sore throat. She states she has been able to eat and drink normally however. Today she started to feel little feverish. Her home health nurse visited and noted her BP to be \"low\" thus sent her in for evaluation knowing she was exposed to [employer]. She has breast cancer and recently completed her chemotherapy about 3 weeks ago with Dr. Yamilet Ballard.  She states she tolerated it well. She lives home alone and ambulates with a walker. She denies any falls, lightheaded or dizziness, syncope. She denies any chest pain. She states her cough is nonproductive but does describe it as \"phlegmy\" she has poor vision and is unable to say any definitive color to the sputum. She denies any myalgias. She has chronic leg pain at night. She denies any dysuria, hematuria, diarrhea, abdominal pain, nausea or vomiting. \" Restrictions/Precautions:  Restrictions/Precautions: Fall Risk,Isolation,General Precautions  Position Activity Restriction  Other position/activity restrictions: Droplet plus, COVID-19, blind in R eye    Subjective:  Chart Reviewed: Yes  Patient assessed for rehabilitation services?: Yes  Family / Caregiver Present: No  Subjective: OK to see pt per nursing. Pt in bedside chair when therapy arrived, agreeable to PT session, requesting to get back in bed following use of restroom. Pt pleasant and cooperative during session. General:  Overall Orientation Status: Within Functional Limits  Follows Commands: Within Functional Limits    Vision: Impaired  Vision Exceptions:  (Blind in R eye)    Hearing: Within functional limits         Pain: denies    Vitals: Oxygen: room air >90% during session    Social/Functional History:    Lives With: Alone  Type of Home: House  Home Layout: One level  Home Access: Stairs to enter without rails  Entrance Stairs - Number of Steps: 1 LEI  Home Equipment: 4 wheeled walker,Wheelchair-manual,Cane     Bathroom Shower/Tub: Tub/Shower unit,Shower chair with back  Bathroom Toilet: Standard  Bathroom Equipment: 3-in-1 commode  Bathroom Accessibility: Accessible    Receives Help From: Marian Whalen (comment) (Aide helps 2 hrs/day 5x/wk)  ADL Assistance: Needs assistance  Homemaking Assistance: Needs assistance  Homemaking Responsibilities: No  Ambulation Assistance: Independent  Transfer Assistance: Independent    Active : No  Occupation: Retired  Leisure & Hobbies: Quilting in the past  Additional Comments: Pt was using a 4 wheeled walker. She took spongebaths on weekends but was taking a shower when the aide was available to help her. She warms up food with the microwave. Her aide does the housekeeping and shopping for her.  Pt complete light cooking or completes microwavable meals    OBJECTIVE:  Range of Motion:  Bilateral Lower Extremity: WNL    Strength:  Bilateral Lower Extremity: Impaired - 4/5, grossly deconditioned    Balance:  Static Sitting Balance:  Supervision increased time to complete toileting as pt trying to have BM  Dynamic Sitting Balance: Supervision, Stand By Assistance to complete dc care  Static Standing Balance: Contact Guard Assistance, X 1, with cues for safety, with verbal cues with use of RW for support with cues for safety  Dynamic Standing Balance: Contact Guard Assistance, Minimal Assistance, X 1, with cues for safety, with verbal cues, to complete donning/doffing brief and pants and to change panty liner and to wash hands at sink with reaching    Bed Mobility:  Sit to Supine: Stand By Assistance, X 1, with head of bed flat, with rail, with verbal cues    Scooting: Stand By Assistance, X 1  Cues with good demo  Transfers:  Sit to Stand: [address], X 1, cues for hand placement, with verbal cues  Stand to Sit:Contact Guard Assistance, X 1, cues for hand placement, with verbal cues  RW for support. Pt completed from various surfaces and heights during session, no LOB noted  Ambulation:  Contact Guard Assistance, X 1, with cues for safety, with verbal cues   Distance: 10 feet x2  Surface: Level Tile  Device:Rolling Walker  Gait Deviations: Forward Flexed Posture, Slow Gillian, Decreased Step Length Bilaterally, Decreased Gait Speed and Decreased Terminal Knee Extension  Cues for safety, no LOB noted with min A required for AD management due to visual deficits    Functional Outcome Measures: Completed  AM-PAC Inpatient Mobility Raw Score : 17  AM-PAC Inpatient T-Scale Score : 42.13    ASSESSMENT:  Activity Tolerance:  Patient tolerance of  treatment: good. Treatment Initiated: Treatment and education initiated within context of evaluation.   Evaluation time included review of current medical information, gathering information related to past medical, social and functional history, completion of standardized testing, formal and informal with bed mobility tasks with bed flat to return home with increased IND. Short term goal 4: Pt will negotiate 1 step for LEI in the home with RW for support and S for safety to return home safely. Long term goals  Time Frame for Long term goals : NA due to short ELOS    Following session, patient left in safe position with all fall risk precautions in place. Pt back in bed following session, all needs and call light in reach with bed alarm on.

## 2022-01-24 NOTE — PROGRESS NOTES
[facility]  INPATIENT OCCUPATIONAL THERAPY  STR 6A CAPACITY EBOLA  EVALUATION    Time:   Time In: 930  Time Out: 1015  Timed Code Treatment Minutes: 30 Minutes  Minutes: 45          Date: 2022  Patient Name: Sky Chatman,   Gender: female      MRN: [id number]  : 1933  (80 y.o.)  Referring Practitioner: Dr. Jennifer Serrano MD  Diagnosis: Dehydration  Additional Pertinent Hx: Pt admitted with C/O low BP, sore throat and cough. 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 Restrictions/Precautions:  Restrictions/Precautions: Fall Risk,Isolation  Position Activity Restriction  Other position/activity restrictions: Droplet plus    Subjective  Chart Reviewed: Wendie Torres and Physical  Family / Caregiver Present: No    Subjective: Pleasant and cooperative  Comments: RN approved session. Pt agreed to do her self care after making a trip to use the bathroom.     Pain:  Pain Assessment  Patient Currently in Pain: Denies    Vitals: Oxygen: Pt was showing O2 saturation of 94% while at rest on 1L of O2; pt showed 84% for brief period after having been up and active    Social/Functional History:  Lives With: Alone  Type of Home: House  Home Layout: One level  Home Access: Level entry  Home Equipment: 4 wheeled walker,Wheelchair-manual,Cane   Bathroom Shower/Tub: Tub/Shower unit,Shower chair with back  Bathroom Toilet: Standard  Bathroom Equipment: 3-in-1 commode  Bathroom Accessibility: Accessible    Receives Help From: Marian Whalen (comment) (Aide helps 2 hrs/day 5x/wk)  ADL Assistance: Needs assistance  Homemaking Assistance: Needs assistance  Homemaking Responsibilities: No  Ambulation Assistance: Independent  Transfer Assistance: Independent    Active : No  Patient's  Info: family  Occupation: Retired  Leisure & Hobbies: Quilting in the past  Additional Comments: Pt was using a 4 wheeled walker. She took spongebaths on weekends but was taking a shower when the aide was available to help her. She warms up food with the microwave. Her aide does the housekeeping and shoping for her. VISION:Blind in her R eye and had limited vision in her L eye- neglect    HEARING:  WNL    COGNITION: Decreased Problem Solving    RANGE OF MOTION:  Bilateral Upper Extremity:  Limited PIP and DIP flexion secondary to OA    STRENGTH:  Bilateral Upper Extremity:  Impaired - 3+/5 deltoid; 3+/5 pectoral; 4-/5 biceps/triceps    SENSATION:    Numbness in her L hand    ADL:   Grooming: with set-up. Pt washed her hands and face without difficulty  Bathing: Stand By Assistance. Pt did not wash her legs below the thighs this day; she completed most of it while sitting on the toilet  Upper Extremity Dressing: Minimal Assistance. [place] a hospital gown and donning a pajama top while having help to fasten the buttons  Lower Extremity Dressing: SBA. donning her pajama bottoms  Toileting: Contact Guard Assistance. pt wiped herself but had help with donning her Depends after she had been incontinent of urine and had to change them  Toilet Transfer: Stand By Assistance. Cues needed for standing from the standard toilet seat with the grabbar on her R side     BALANCE:  Sitting Balance:  Stand By Assistance. doing her spongebath while sitting on the toilet  Standing Balance: [address]  finishing donning her Depends and washing her perinuem    BED MOBILITY:  Rolling to Right: Stand By Assistance, with head of bed raised, with rail    Supine to Sit: Stand By Assistance, with head of bed flat, with rail    Scooting: Stand By Assistance, with rail      TRANSFERS:  Sit to Stand:  Contact Guard Assistance. from the edge of bed  Stand to Sit: Stand By Assistance. to the recliner chair    FUNCTIONAL MOBILITY:  Assistive Device: Rolling Walker  Assist Level:  Contact Guard Assistance and with verbal cues . Distance: To and from bathroom  Help needed for holding onto the O2 line; pt could steer around obstacles with the walker with slight difficulty on the R side bumping into things     Activity Tolerance:  Patient tolerance of  treatment: fair. Pt stood for 2 trials of 1-2 minute duration while finishing her self care. She was needing a sitting rest break between activities. Assessment:  Assessment: Patient would benefit from continued skilled OT services to address above deficits. She presents with dehydration. She has hx of L breast CA with mets to the brain. She had a R craniotomy to remove the tumor 7 months ago. She has R eye blindness. Pt was doing her own self care but did have help with taking showers. She had an aide who also helped her with her housekeeping. Pt walked with a 4 wheeled walker prior to admission. Pt demonstrated walking with a rolling walker with CGA and verbal cues for attending to the O2 line. She did her self care with [facility] for buttoning her top and CGA for changing her Depends after had been incontinent. She reports wearing pullups at all times. She was not using supplemental O2 at home prior to admission. Pt was using 1L of O2 at this time. Performance deficits / Impairments: Decreased functional mobility ,Decreased ADL status,Decreased endurance,Decreased strength  Prognosis: Good  REQUIRES OT FOLLOW UP: Yes  Decision Making: Medium Complexity    Treatment Initiated: Treatment and education initiated within context of evaluation.   Evaluation time included review of current medical information, gathering information related to past medical, social and functional history, completion of standardized testing, formal and informal observation of tasks, assessment of data and development of plan of care and goals. Treatment time included skilled education and facilitation of tasks to increase safety and independence with ADL's for improved functional independence and quality of life. Pt agreed to work with therapy and was receptive to having [address] when she is discharged. Discussed home safety with pt and the possibility of using O2 at home. Pt stated that she had used it in the past for a short period of time. Discharge Recommendations:  Home with Home health OT    Patient Education:  OT Education: Kevin Mosquera [facility]  Patient Education: Home safety with use of a home O2 line if needed    Equipment Recommendations:  Equipment Needed: No    Plan:  Times per week: 5x  Current Treatment Recommendations: Functional Mobility Training,Endurance Training,Self-Care / ADL,Strengthening  Plan Comment: Pt would benefit from continued skilled OT services when medically stable and discharged from Acute. HHOT recommended. Specific instructions for Next Treatment: Functional mobility; ADLs and endurance training; UE exercises and relaxed breathing technique. See long-term goal time frame for expected duration of plan of care. If no long-term goals established, a short length of stay is anticipated. Goals:  Patient goals : \"I want to get to feeling better and be able to return to my home routine. \" pt states. Short term goals  Time Frame for Short term goals: By discharge  Short term goal 1: Pt will complete lower body ADLs while using any adaptations needed with SBA and verbal cues for energy conservation techniques to increase her independence and tolerance of doing her self care routine. Short term goal 2: Pt will complete toileting routine including transfers to/from the standard toilet seat while using a grabbar with SBA to increase her independence with self care.   Short term goal 3: Pt will demonstrate functional mobility walking no/from the bathroom while using a rolling walker with SBA while keeping her O2 saturation > than 90% to prepare for doing self care at the sink. Short term goal 4: Pt will complete BUE ROM/light resistance exercises with verbal cues for steady breathing technique to increase her endurance and strength for ease of doing self care. Following session, patient left in safe position with all fall risk precautions in place.

## 2022-01-24 NOTE — PROGRESS NOTES
Hospitalist Progress Note      Patient:  Gisela Haq      Unit/Bed:6A-08/008-A    YOB: 1933    MRN: [id number]       Acct: [de-identified]     PCP: Adelia Tinajero MD    Date of Admission: 1/21/2022    Date/Time of Evaluation:  1/24/2022 at 12:30 PM    Assessment/Plan:    1. COVID-19 infection -- +exposure 1/14, sx started 1/20, unvaccinated --> on admission 1/21 = ferritin 73, , D-dimer elevated 2096, CXR (-) for infiltrate, CTA chest (-) PE, bronchial wall thickening seen in lung bases can relate to infectious bronchiolitis or inflammatory process, very small right pleural effusion --> stable on RA on admission and O2 dropped to 86% on 1/22 -> O2 started 2L and weaned to RA 1/23 but back up to 1L with drop to 88% on 1/23 pm --> back to RA 1/24 am  - high risk for further decompensation thus cont to monitor closely as CRP trending up 1/23 and again 1/24  -- CRP trending up 1/23 to 10.53 from from 2.9 on 1/21 and lungs more rhonchorus, +fever 102 1/22 am thus likely progressing dz --> repeat CXR 1/23 still (-) however thus Started decadron 6 mg po daily 1/23 -->  trend CRP and monitor resp status closely  -- ?start baricitinib but pt on chronic steroids  -- IS, Acapella with bronchial wall thickening on CT  --  O2 as needed  -- Add zinc, vitamin C, already takes vitamin D, APAP prn for fever. 2. Acute hypoxic respiratory failure -- not POA - Due to #1 -- O2 down to 86% on RA 1/22 pm --> started on 2L O2 and weaned off 1/23 am but back to 1L O2 1/23 pm for sat 88% --> weaned back to RA 1/24  - cont O2 as needed - increase activity and monitor O2 - ?home O2 eval but pt with poor vision and lives alone and would be very high fall hazard  3.  Borderline hypotension --BP 95/49 upon arrival to the ER -patient uncertain what her BP was at home that made [facility] RN send her in --> s/p 1L IVF and BP up --hold further IVF and cont hold home Imdur and hydralazine --monitor and adjust as needed  --Patient denies decreased p.o. intake or diarrhea thus less likely hypovolemia but possible with #1  -- no signs of sepsis - WBC WNL, LA normal 0.9, PCT (-), u/a (-), CTA chest (-) PE and no infiltrates, influenza   3. Fever 1/22 -- due to #1 -- see above -- [facility] 1/21 (-) to date, u/a 1/21 (-), CTA chest 1/21 (-) infiltrates but with wall thickening lower bronchi -- repeat CXR 1/23 (-) but required O2 1/22 pm/1/23 am -  APAP prn fever. 4. Pharyngitis --likely viral with #1 -improving -- Chloraseptic spray as needed  5. Metastatic breast cancer with mets to liver, brain s/p debulking/craniotomy 7/2021 --follows with Dr. Eusebio Mc -- CTA (-) PE but with 2.7 x 1.6 cm soft tissue seen in the axillary tail of the right breast --> was noted to have left breast cancer s/p bx and right abn NOT noted on CT chest 7/2021 but her mammogram 12/21 noted a \"stable density demonstrated within the right breast\" --follow-up with Dr. Eusebio Mc  --Tico Garcia for hx brain mets  5. Thrombocytopenia --105 on admission 1/21 and 92 on 1/24  ->? Viral versus due to chemo but was done ~ 3 weeks ago ---platelets 353 back in 11/2021--monitor, no signs of bleeding at this time  6. CAD --asymptomatic, troponin negative --hold home Imdur for borderline BP, continue ASA - monitor  7. Pulmonary nodules --2 mm RUL, 4 mm LLL per CTA chest -per prior CT 7/2021 noted the 4 mm left lung nodule dating back to 2016 and likely due to old granulomatous disease but did not mention the right -follow-up Dr. Eusebio Mc  8. Essential hypertension-- cont hold home Imdur, hydralazine with above -- monitor and adjust prn  9. History left subclavian artery stenosis with stenting -- cont home ASA --CTA chest 1/21 noted stent in proximal left subclavian artery with about 40-50% stenosis in the proximal left subclavian artery --caution with BPs  10. History of SVT -- noted brief episode on tele 1/22, 1/24 -- monitor  11. HLD --continue statin  12. History carotid stenosis  13.  Anxiety --continue home as needed Xanax, Zoloft  14. GERD --continue home PPI  15. Hypothyroidism -- cont home Synthroid  16. History of temporal arteritis with right eye blindness -on chronic prednisone 1 mg daily at home -continue  17. Code status -- DNR-CCA    Dispo  -- 1/24/2022 -> pt up doing better - O2 down slightly again last pm and on 1L O2 until this am - weaned off to RA this am.  Hasn't worked with PT/OT yet. Has some wheeze this am thus added albuterol prn and cont work with IS, acapella -- cont Monitor temps, resp status, BP, lytes, renal fxn and await PT/OT evals as pt lives alone with HH. Likely home tomorrow      Chief Complaint: low BP, sore throat, cough    Hospital Course: Veronique Evans is a 80 y.o. female with metastatic breast cancer to brain, liver s/p craniotomy for brain mets, temporal arteritis with right eye blindness, CAD, hypothyroidism, anxiety, GERD, carotid stenosis, HLD, chronic back pain, subclavian artery stenosis left, SVT [facility] [place] [id number] sent in by Artis Rice RN with \"low blood pressure\", cough, +covid exposure, sore throat. In ER, upon arrival her BP was 95/49, 92% on RA, temp 99.5, HR 77. CBC was unremarkable except for platelets of 256. CMP was WNL, magnesium 2.1, lactic acid 0.9, procalcitonin 0.06, , ferritin 73. Influenza negative, COVID positive, UA negative. CXR showed no acute process, CTA chest with no PE, 2.7 x 1.6 cm soft tissue seen in the axillary tail of the right breast, 2 mm right upper lobe pulmonary nodule, 4 mm LLL nodule, bronchiolar wall thickening seen with the lung bases could be infectious bronchiolitis or inflammatory process, very small right pleural effusion, mildly nodular liver surface, stent in the left proximal subclavian artery with 40-50% stenosis in the proximal left subclavian artery, aortocoronary calcifications mild emphysematous changes.   She was given a 1 L IVF LR bolus in ER and due to her borderline BP, COVID-positive status, and history hospitalist service was contacted for further evaluation and treatment. -- BP improved with IVF in ER - no further IVF given and BP stable;  Developed fever 102 1/22 am.      Subjective/HPI:   -- 1/24/2022  --> pt states she is feeling little better. Up in chair - no further fevers since 1/22. Still with cough - clear sputum - working with IS with RT this am.  Denies any pain. Sore throat improved. O2 down to 88% last pm and back on 1L O2 overnight - weaned off this am so far. Denies feeling shortness of breath. Denies cp. Denies abd pain, n/v.  Janny po. She feels she is moving close to her baseline. PMH, SURGICAL HX, FH, SOCIAL HX reviewed and updated as needed. Medications:  Reviewed    Infusion Medications    sodium chloride       Scheduled Medications    dexamethasone  6 mg Oral Daily    docusate sodium  100 mg Oral BID    levETIRAcetam  500 mg Oral BID    levothyroxine  100 mcg Oral Daily    cetirizine  5 mg Oral Daily    pantoprazole  40 mg Oral QAM AC    [Held by provider] predniSONE  1 mg Oral Daily    aspirin  81 mg Oral Daily    sertraline  50 mg Oral BID    atorvastatin  10 mg Oral Daily    vitamin D  50,000 Units Oral Weekly    sodium chloride flush  5-40 mL IntraVENous 2 times per day    enoxaparin  30 mg SubCUTAneous Q12H    zinc sulfate  50 mg Oral Daily    ascorbic acid  500 mg Oral Daily     PRN Meds: albuterol sulfate HFA, ALPRAZolam, melatonin, sennosides-docusate sodium, traZODone, sodium chloride flush, sodium chloride, ondansetron **OR** ondansetron, polyethylene glycol, acetaminophen **OR** acetaminophen, guaiFENesin-dextromethorphan, phenol      Intake/Output Summary (Last 24 hours) at 1/24/2022 1230  Last data filed at 1/23/2022 1240  Gross per 24 hour   Intake 100 ml   Output --   Net 100 ml       Diet:  ADULT DIET;  Regular    Exam:  BP (!) 130/47   Pulse 65   Temp 97.6 °F (36.4 °C) (Oral)   Resp 18   Ht 5' (1.524 m) Wt 134 lb (60.8 kg)   LMP  (LMP Unknown) Comment: age 52 ovaries intact  SpO2 93%   BMI 26.17 kg/m²     General appearance: No apparent distress, appears stated age and cooperative. Oriented x 3, looks sicker today  HEENT: Pupils equal, round, and reactive to light. Conjunctivae/corneas clear. MMM. Neck: Supple, with full range of motion. Trachea midline. Respiratory:  Normal respiratory effort. +rhonchi on right side >left with wheeze in mid left lung. No respiratory distress or accessory muscle use. Cardiovascular: Regular rate and rhythm with normal S1/S2 without murmurs, rubs or gallops. No JVD. Abdomen: Soft, non-tender, non-distended with normal bowel sounds. No rebound or guarding  Musculoskeletal: No clubbing, cyanosis or edema bilaterally. Full range of motion without deformity. No calf tenderness palpation  Skin: Skin color, texture, turgor normal.  No rashes or lesions. Neurologic:  Neurovascularly intact without any focal sensory/motor deficits. Cranial nerves: II-XII intact, grossly non-focal.  Psychiatric: Alert and oriented, thought content appropriate, normal insight  Capillary Refill: Brisk,< 3 seconds   Peripheral Pulses: +2 palpable, equal bilaterally       All labs reviewed and interpreted by me:  Labs:   Recent Labs     01/21/22  1430 01/22/22  0652 01/24/22  0649   WBC 5.4 6.3 4.6*   HGB 12.8 12.5 14.9   HCT 40.6 39.8 46.3   * 98* 92*     Recent Labs     01/22/22  0652 01/23/22  0748 01/24/22  0649    140 139   K 3.6 3.6 4.4    104 104   CO2 26 25 26   BUN 14 14 15   CREATININE 0.6 0.5 0.5   CALCIUM 8.3* 7.5* 8.3*     Recent Labs     01/21/22  1430   AST 22   ALT 15   BILITOT 0.4   ALKPHOS 48     No results for input(s): INR in the last 72 hours.   Recent Labs     01/21/22  1516   TROPONINT < 0.010       Urinalysis:      Lab Results   Component Value Date    NITRU NEGATIVE 01/21/2022    WBCUA NONE SEEN 01/21/2022    BACTERIA NONE SEEN 01/21/2022    RBCUA 0-2 ambulation           [] Already on Anticoagulation     Disposition:    [x] [place] with [place]       [] TCU       [] Rehab       [] Psych       [] SNF       [] [place]       [] Other-    Code Status: DNR-CCA    PT/OT Eval Status: consulted      Electronically signed by ANCELMO GOETZ MD on 1/24/2022 at 12:30 PM when pt evaluated - final note filed late

## 2022-01-24 NOTE — CARE COORDINATION
1/24/22, 12:41 PM EST  DISCHARGE PLANNING EVALUATION:    Sintia Courser       Admitted: 1/21/2022/ Serafin 80 day: 1   Location: -08/008-A Reason for admit: Dehydration [E86.0]  Hypotension, unspecified hypotension type [I95.9]  COVID-19 virus infection [U07.1]  COVID-19 [U07.1]   PMH:  has a past medical history of Anxiety, Blind right eye, Breast cancer ([address] Breast cancer metastasized to liver (5/16) and brain (7/21) ([address] Carotid stenosis, Colostomy in place [facility]), Degenerative arthritis of cervical spine, GERD (gastroesophageal reflux disease), History of craniotomy, Hx antineoplastic chemo, Hypercholesteremia, Hypoestrogenism, Hypothyroidism, Lumbago, Lumbosacral spinal stenosis, MDRO (multiple drug resistant organisms) resistance, Metastasis ([address] Personal history of cardiac dysrhythmia, Sigmoid diverticulosis, Spondylolisthesis of lumbosacral region, Steroid-induced hyperglycemia, Subclavian artery stenosis ([address] Superior and Inferior Pubic ramus fracture, right, closed, initial encounter ([address] SVT (supraventricular tachycardia) ([address] Temporal arteritis ([address] Vertebral artery occlusion, and Vitamin D deficiency. Procedure:   1/21 CTA chest: 1. No CT evidence of pulmonary embolus. 2. A 2.7 x 1.6 cm soft tissue seen in the axillary tail of the right breast. Correlate with mammography and physical exam. 3. 2 mm right upper lobe pulmonary nodule. 4 mm left lower lobe pulmonary nodule. 4. Bronchiolar wall thickening seen in the lung bases. This can relate to infectious bronchiolitis or inflammatory process. 5. Very small right pleural effusion. 6. Mildly nodular liver surface morphology is a nonspecific finding. It can be seen with chronic liver disease or cirrhosis. Clinical correlation is recommended. 7. Other findings as described above. 1/23 CXR: no acute findings   Barriers to Discharge:  + Covid. Requiring 1L NC. CRP 11.35 (2.91 on 1/21). Inhaler. Decadron.  Vit C, D, zinc.   PCP: Clay Bradley Sue Carpenter MD  Readmission Risk Score: 19.8 ( )%    Patient Goals/Plan/Treatment Preferences: Catie Alvarez is from home with [employer] service,  consult placed. Transportation/Food Security/Housekeeping Addressed:  No issues identified.

## 2022-01-25 LAB
ANION GAP SERPL CALCULATED.3IONS-SCNC: 11 MEQ/L (ref 8–16)
BUN BLDV-MCNC: 21 MG/DL (ref 7–22)
C-REACTIVE PROTEIN: 4.57 MG/DL (ref 0–1)
CALCIUM SERPL-MCNC: 8.1 MG/DL (ref 8.5–10.5)
CHLORIDE BLD-SCNC: 105 MEQ/L (ref 98–111)
CO2: 27 MEQ/L (ref 23–33)
CREAT SERPL-MCNC: 0.6 MG/DL (ref 0.4–1.2)
GFR SERPL CREATININE-BSD FRML MDRD: > 90 ML/MIN/1.73M2
GLUCOSE BLD-MCNC: 97 MG/DL (ref 70–108)
POTASSIUM SERPL-SCNC: 4 MEQ/L (ref 3.5–5.2)
SODIUM BLD-SCNC: 143 MEQ/L (ref 135–145)

## 2022-01-25 PROCEDURE — 80048 BASIC METABOLIC PNL TOTAL CA: CPT

## 2022-01-25 PROCEDURE — 97535 SELF CARE MNGMENT TRAINING: CPT

## 2022-01-25 PROCEDURE — 1200000000 HC SEMI PRIVATE

## 2022-01-25 PROCEDURE — 86140 C-REACTIVE PROTEIN: CPT

## 2022-01-25 PROCEDURE — 6360000002 HC RX W HCPCS: Performed by: FAMILY MEDICINE

## 2022-01-25 PROCEDURE — 6370000000 HC RX 637 (ALT 250 FOR IP): Performed by: FAMILY MEDICINE

## 2022-01-25 PROCEDURE — 36415 COLL VENOUS BLD VENIPUNCTURE: CPT

## 2022-01-25 PROCEDURE — 2580000003 HC RX 258: Performed by: FAMILY MEDICINE

## 2022-01-25 PROCEDURE — 99232 SBSQ HOSP IP/OBS MODERATE 35: CPT | Performed by: FAMILY MEDICINE

## 2022-01-25 RX ORDER — ISOSORBIDE MONONITRATE 30 MG/1
TABLET, EXTENDED RELEASE ORAL
Qty: 90 TABLET | Refills: 1 | Status: SHIPPED
Start: 2022-01-25 | End: 2022-04-01

## 2022-01-25 RX ORDER — DEXAMETHASONE 6 MG/1
6 TABLET ORAL DAILY
Qty: 7 TABLET | Refills: 0 | Status: SHIPPED | OUTPATIENT
Start: 2022-01-25 | End: 2022-08-01 | Stop reason: HOSPADM

## 2022-01-25 RX ORDER — ALBUTEROL SULFATE 90 UG/1
2 AEROSOL, METERED RESPIRATORY (INHALATION) EVERY 6 HOURS PRN
Qty: 18 G | Refills: 3 | Status: ON HOLD | OUTPATIENT
Start: 2022-01-25 | End: 2022-09-08 | Stop reason: HOSPADM

## 2022-01-25 RX ORDER — PREDNISONE 1 MG/1
1 TABLET ORAL DAILY
Qty: 60 TABLET | Refills: 2 | Status: ON HOLD
Start: 2022-01-25 | End: 2022-08-01 | Stop reason: HOSPADM

## 2022-01-25 RX ORDER — ZINC SULFATE 50(220)MG
50 CAPSULE ORAL DAILY
Qty: 30 CAPSULE | Refills: 0 | COMMUNITY
Start: 2022-01-25 | End: 2022-01-28 | Stop reason: SDUPTHER

## 2022-01-25 RX ADMIN — TRAZODONE HYDROCHLORIDE 50 MG: 50 TABLET ORAL at 19:54

## 2022-01-25 RX ADMIN — ENOXAPARIN SODIUM 30 MG: 100 INJECTION SUBCUTANEOUS at 11:10

## 2022-01-25 RX ADMIN — DOCUSATE SODIUM 100 MG: 100 CAPSULE ORAL at 10:57

## 2022-01-25 RX ADMIN — SODIUM CHLORIDE, PRESERVATIVE FREE 10 ML: 5 INJECTION INTRAVENOUS at 09:00

## 2022-01-25 RX ADMIN — SERTRALINE 50 MG: 50 TABLET, FILM COATED ORAL at 10:57

## 2022-01-25 RX ADMIN — DOCUSATE SODIUM 100 MG: 100 CAPSULE ORAL at 20:01

## 2022-01-25 RX ADMIN — LEVETIRACETAM 500 MG: 500 TABLET, FILM COATED ORAL at 20:01

## 2022-01-25 RX ADMIN — DEXAMETHASONE 6 MG: 0.5 TABLET ORAL at 10:57

## 2022-01-25 RX ADMIN — ATORVASTATIN CALCIUM 10 MG: 10 TABLET, FILM COATED ORAL at 20:01

## 2022-01-25 RX ADMIN — LEVOTHYROXINE SODIUM 100 MCG: 0.1 TABLET ORAL at 06:00

## 2022-01-25 RX ADMIN — LEVETIRACETAM 500 MG: 500 TABLET, FILM COATED ORAL at 10:57

## 2022-01-25 RX ADMIN — ENOXAPARIN SODIUM 30 MG: 100 INJECTION SUBCUTANEOUS at 20:02

## 2022-01-25 RX ADMIN — ALPRAZOLAM 0.25 MG: 0.25 TABLET ORAL at 19:54

## 2022-01-25 RX ADMIN — Medication 50 MG: at 10:57

## 2022-01-25 RX ADMIN — ACETAMINOPHEN 650 MG: 325 TABLET ORAL at 08:39

## 2022-01-25 RX ADMIN — SERTRALINE 50 MG: 50 TABLET, FILM COATED ORAL at 20:01

## 2022-01-25 RX ADMIN — PANTOPRAZOLE SODIUM 40 MG: 40 TABLET, DELAYED RELEASE ORAL at 06:00

## 2022-01-25 RX ADMIN — CETIRIZINE HYDROCHLORIDE 5 MG: 10 TABLET, FILM COATED ORAL at 10:56

## 2022-01-25 RX ADMIN — OXYCODONE HYDROCHLORIDE AND ACETAMINOPHEN 500 MG: 500 TABLET ORAL at 10:56

## 2022-01-25 RX ADMIN — ASPIRIN 81 MG: 81 TABLET, COATED ORAL at 10:56

## 2022-01-25 RX ADMIN — SODIUM CHLORIDE, PRESERVATIVE FREE 10 ML: 5 INJECTION INTRAVENOUS at 20:01

## 2022-01-25 ASSESSMENT — PAIN DESCRIPTION - LOCATION
LOCATION: HEAD
LOCATION: HEAD

## 2022-01-25 ASSESSMENT — PAIN DESCRIPTION - PAIN TYPE
TYPE: ACUTE PAIN
TYPE: ACUTE PAIN

## 2022-01-25 ASSESSMENT — PAIN DESCRIPTION - PROGRESSION: CLINICAL_PROGRESSION: GRADUALLY WORSENING

## 2022-01-25 ASSESSMENT — PAIN SCALES - GENERAL
PAINLEVEL_OUTOF10: 8
PAINLEVEL_OUTOF10: 2
PAINLEVEL_OUTOF10: 8
PAINLEVEL_OUTOF10: 6
PAINLEVEL_OUTOF10: 0

## 2022-01-25 ASSESSMENT — PAIN DESCRIPTION - FREQUENCY: FREQUENCY: CONTINUOUS

## 2022-01-25 ASSESSMENT — PAIN DESCRIPTION - ONSET: ONSET: GRADUAL

## 2022-01-25 ASSESSMENT — PAIN - FUNCTIONAL ASSESSMENT: PAIN_FUNCTIONAL_ASSESSMENT: PREVENTS OR INTERFERES SOME ACTIVE ACTIVITIES AND ADLS

## 2022-01-25 ASSESSMENT — PAIN DESCRIPTION - DESCRIPTORS: DESCRIPTORS: POUNDING;HEADACHE

## 2022-01-25 ASSESSMENT — PAIN DESCRIPTION - ORIENTATION: ORIENTATION: ANTERIOR

## 2022-01-25 NOTE — CARE COORDINATION
1/25/22, 12:27 PM EST    DISCHARGE PLANNING EVALUATION    Nurse states discharge is likely today. SW spoke with pt, states she is prepared to go home however, she will need a ride due to her niece, Lorri Huggins is not here yet. SW spoke with nurse, states pt should not go home alone and should wait until her niece arrives. SW spoke with pt and feels this is not necessary. Pt does not have david's phone number because she left her phone at home. SW spoke with Kishore Valentine at [facility], they cannot come to pts home tonight and can be there tomorrow. Kishore Valentine did give sw phone numbers of family. ALEX spoke with nieces Maximilian Stanley and Marcos Dannie, they do not keep in touch with pt and instructed sw to call Bernadette, another family member. SW called Bernadette, she lives in [place] and cannot  pt today, Bernadette states Lorri Huggins is supposed to heading to [facility] tomorrow to stay with pt. Bernadette provided [facility] number/  ALEX spoke with Lorri Huggins, states she was planning to come tomorrow however, she will see what she can do today and possibly come today. Lorri Huggins asked SW to call her back in an hour or so. ALEX updated pt. ALEX called stanley Huggins again to see if a decision has been made about coming to [facility] today. Lorri Huggins not able to come to the phone, on call with her physician. Lorri Huggins will call sw back in 30 to 40 minutes.

## 2022-01-25 NOTE — PROGRESS NOTES
Hospitalist Progress Note      Patient:  Gricelda Davies      Unit/Bed:6A-08/008-A    YOB: 1933    MRN: [id number]       Acct: [de-identified]     PCP: Rolando Mantilla MD    Date of Admission: 1/21/2022    Date/Time of Evaluation:  1/25/2022 at 5:39 PM    Assessment/Plan:    1. COVID-19 infection -- +exposure 1/14, sx started 1/20, unvaccinated --> on admission 1/21 = ferritin 73, , D-dimer elevated 2096, CXR (-) for infiltrate, CTA chest (-) PE, bronchial wall thickening seen in lung bases can relate to infectious bronchiolitis or inflammatory process, very small right pleural effusion --> stable on RA on admission and O2 dropped to 86% on 1/22 -> O2 started 2L and weaned to RA 1/23 but back up to 1L with drop to 88% on 1/23 pm --> on RA since 1/24 am  - CRP trending up 1/23 and again 1/24 to 11.35 and trending down to 4.57  -- CRP trending up 1/23 to 10.53 from from 2.9 on 1/21 and lungs more rhonchorus, +fever 102 1/22 am thus likely progressing dz --> repeat CXR 1/23 still (-) however thus Started decadron 6 mg po daily 1/23 --> monitor resp status closely  -- albuterol MDI prn added 1/24 for some wheezing  -- ?start baricitinib but pt weaned off O2 quickly  -- IS, Acapella with bronchial wall thickening on CT  --  O2 as needed  -- zinc, vitamin C, already takes vitamin D, APAP prn for fever. 2. Acute hypoxic respiratory failure -- not POA - Due to #1 -- O2 down to 86% on RA 1/22 pm --> started on 2L O2 and weaned off 1/23 am but back to 1L O2 1/23 pm for sat 88% --> weaned back to RA since 1/24  - cont O2 as needed - increase activity and monitor O2 - avoid home O2 if able as pt with poor vision and lives alone and would be very high fall hazard  3.  Borderline hypotension --BP 95/49 upon arrival to the ER -patient uncertain what her BP was at home that made Artis Rice RN send her in --> s/p 1L IVF and BP up thus no further IVF given and cont hold home Imdur and hydralazine --monitor and adjust as needed - avoid hypotension given age and risk for falls  --Patient denied decreased p.o. intake or diarrhea on admission thus less likely hypovolemia but possible with #1  -- no signs of sepsis - WBC WNL, LA normal 0.9, PCT (-), u/a (-), CTA chest (-) PE and no infiltrates, influenza   3. Fever 1/22 -- due to #1 -- see above -- afebrile since 1/23 -- BC 1/21 (-) to date, u/a 1/21 (-), CTA chest 1/21 (-) infiltrates but with wall thickening lower bronchi -- repeat CXR 1/23 (-) but required O2 1/22 pm/1/23 am -  APAP prn fever. 4. Pharyngitis --likely viral with #1 -improving -- Chloraseptic spray as needed  5. Metastatic breast cancer with mets to liver, brain s/p debulking/craniotomy 7/2021 --follows with Dr. Jose G Harley -- CTA (-) PE but with 2.7 x 1.6 cm soft tissue seen in the axillary tail of the right breast --> was noted to have left breast cancer s/p bx and right abn NOT noted on CT chest 7/2021 but her mammogram 12/21 noted a \"stable density demonstrated within the right breast\" --follow-up with Dr. Jose G Harley  --Ashley Cespedes for hx brain mets  5. Thrombocytopenia --105 on admission 1/21 and 92 on 1/24  ->? Viral versus due to chemo but was done ~ 3 weeks ago ---platelets 444 back in 11/2021--monitor, no signs of bleeding at this time  6. CAD --asymptomatic, troponin negative --hold home Imdur for borderline BP, continue ASA - monitor  7. Pulmonary nodules --2 mm RUL, 4 mm LLL per CTA chest -per prior CT 7/2021 noted the 4 mm left lung nodule dating back to 2016 and likely due to old granulomatous disease but did not mention the right -follow-up Dr. Jose G Harley  8. Essential hypertension-- cont hold home Imdur, hydralazine with above -- monitor and adjust prn  9. History left subclavian artery stenosis with stenting -- cont home ASA --CTA chest 1/21 noted stent in proximal left subclavian artery with about 40-50% stenosis in the proximal left subclavian artery --caution with BPs  10.  History of SVT -- noted brief episode on tele 1/22, 1/24 -- monitor  11. HLD --continue statin  12. History carotid stenosis  13. Anxiety --continue home as needed Xanax, Zoloft  14. GERD --continue home PPI  15. Hypothyroidism -- cont home Synthroid  16. History of temporal arteritis with right eye blindness -on chronic prednisone 1 mg daily at home -continue  17. Code status -- DNR-CCA    Dispo  -- 1/25/2022 -> Pt seen earlier today -- pt up doing better, remains on RA and plan was for d/c today however pt unable to obtain ride and for someone to stay with her at d/c and [address] [place] [address] can't come out until tomorrow either. Cont monitor overnight, monitor lytes, renal fxn, BP, resp status. Home in am if remains stable and help at home arranged      Chief Complaint: low BP, sore throat, cough    Hospital Course: Steffany Doll is a 80 y.o. female with metastatic breast cancer to brain, liver s/p craniotomy for brain mets, temporal arteritis with right eye blindness, CAD, hypothyroidism, anxiety, GERD, carotid stenosis, HLD, chronic back pain, subclavian artery stenosis left, SVT who presented to [facility] sent in by Artis Rice RN with \"low blood pressure\", cough, +covid exposure, sore throat. In ER, upon arrival her BP was 95/49, 92% on RA, temp 99.5, HR 77. CBC was unremarkable except for platelets of 047. CMP was WNL, magnesium 2.1, lactic acid 0.9, procalcitonin 0.06, , ferritin 73. Influenza negative, COVID positive, UA negative.   CXR showed no acute process, CTA chest with no PE, 2.7 x 1.6 cm soft tissue seen in the axillary tail of the right breast, 2 mm right upper lobe pulmonary nodule, 4 mm LLL nodule, bronchiolar wall thickening seen with the lung bases could be infectious bronchiolitis or inflammatory process, very small right pleural effusion, mildly nodular liver surface, stent in the left proximal subclavian artery with 40-50% stenosis in the proximal left subclavian artery, aortocoronary calcifications mild emphysematous changes. She was given a 1 L IVF LR bolus in ER and due to her borderline BP, COVID-positive status, and history hospitalist service was contacted for further evaluation and treatment. -- BP improved with IVF in ER - no further IVF given and BP stable;  Developed fever 102 1/22 am.      Subjective/HPI:   -- 1/25/2022  --> pt states she is feeling little better. No further fevers since 1/22. Still with cough with +production. Denies any CP or feeling sob. Denies any pain. Sore throat improved. Remains on RA. Denies cp. Denies abd pain, n/v.  Janny po. She feels she is moving close to her baseline. PMH, SURGICAL HX, FH, SOCIAL HX reviewed and updated as needed. Medications:  Reviewed    Infusion Medications    sodium chloride       Scheduled Medications    dexamethasone  6 mg Oral Daily    docusate sodium  100 mg Oral BID    levETIRAcetam  500 mg Oral BID    levothyroxine  100 mcg Oral Daily    cetirizine  5 mg Oral Daily    pantoprazole  40 mg Oral QAM AC    [Held by provider] predniSONE  1 mg Oral Daily    aspirin  81 mg Oral Daily    sertraline  50 mg Oral BID    atorvastatin  10 mg Oral Daily    vitamin D  50,000 Units Oral Weekly    sodium chloride flush  5-40 mL IntraVENous 2 times per day    enoxaparin  30 mg SubCUTAneous Q12H    zinc sulfate  50 mg Oral Daily    ascorbic acid  500 mg Oral Daily     PRN Meds: albuterol sulfate HFA, ALPRAZolam, melatonin, sennosides-docusate sodium, traZODone, sodium chloride flush, sodium chloride, ondansetron **OR** ondansetron, polyethylene glycol, acetaminophen **OR** acetaminophen, guaiFENesin-dextromethorphan, phenol      Intake/Output Summary (Last 24 hours) at 1/25/2022 5028  Last data filed at 1/25/2022 1245  Gross per 24 hour   Intake 700 ml   Output --   Net 700 ml       Diet:  ADULT DIET;  Regular    Exam:  BP (!) 156/52   Pulse 74   Temp 99 °F (37.2 °C) (Oral)   Resp 18   Ht 5' (1.524 m)   Wt 134 lb (60.8 kg)   LMP  (LMP Unknown) Comment: age 52 ovaries intact  SpO2 95%   BMI 26.17 kg/m²     General appearance: No apparent distress, appears stated age and cooperative. Oriented x 3, looks better today  HEENT: Pupils equal, round, and reactive to light. Conjunctivae/corneas clear. MMM. Neck: Supple, with full range of motion. Trachea midline. Respiratory:  Normal respiratory effort. +rhonchi in bases right side >left with wheeze in bilateral bases. No respiratory distress or accessory muscle use. Cardiovascular: Regular rate and rhythm with normal S1/S2 without murmurs, rubs or gallops. No JVD. Abdomen: Soft, non-tender, non-distended with normal bowel sounds. No rebound or guarding  Musculoskeletal: No clubbing, cyanosis or edema bilaterally. Full range of motion without deformity. No calf tenderness palpation  Skin: Skin color, texture, turgor normal.  No rashes or lesions. Neurologic:  Neurovascularly intact without any focal sensory/motor deficits. Cranial nerves: II-XII intact, grossly non-focal.  Psychiatric: Alert and oriented, thought content appropriate, normal insight  Capillary Refill: Brisk,< 3 seconds   Peripheral Pulses: +2 palpable, equal bilaterally       All labs reviewed and interpreted by me:  Labs:   Recent Labs     01/24/22  0649   WBC 4.6*   HGB 14.9   HCT 46.3   PLT 92*     Recent Labs     01/23/22  0748 01/24/22  0649 01/25/22  0539    139 143   K 3.6 4.4 4.0    104 105   CO2 25 26 27   BUN 14 15 21   CREATININE 0.5 0.5 0.6   CALCIUM 7.5* 8.3* 8.1*     No results for input(s): AST, ALT, BILIDIR, BILITOT, ALKPHOS in the last 72 hours. No results for input(s): INR in the last 72 hours. No results for input(s): Lisy Risk in the last 72 hours.     Urinalysis:      Lab Results   Component Value Date    NITRU NEGATIVE 01/21/2022    WBCUA NONE SEEN 01/21/2022    BACTERIA NONE SEEN 01/21/2022    RBCUA 0-2 01/21/2022    BLOODU SMALL 01/21/2022    SPECGRAV 1.011 10/29/2021 GLUCOSEU NEGATIVE 01/21/2022       All radiology images and reports reviewed and interpreted by me:  Radiology:  XR CHEST PORTABLE   Final Result   Impression:   No acute cardiopulmonary pathology. This document has been electronically signed by: Jac Sales MD on    01/23/2022 04:19 AM      CTA Chest W WO Contrast   Final Result      1. No CT evidence of pulmonary embolus. 2. A 2.7 x 1.6 cm soft tissue seen in the axillary tail of the right breast. Correlate with mammography and physical exam.   3. 2 mm right upper lobe pulmonary nodule. 4 mm left lower lobe pulmonary nodule. 4. Bronchiolar wall thickening seen in the lung bases. This can relate to infectious bronchiolitis or inflammatory process. 5. Very small right pleural effusion. 6. Mildly nodular liver surface morphology is a nonspecific finding. It can be seen with chronic liver disease or cirrhosis. Clinical correlation is recommended. 7. Other findings as described above. **This report has been created using voice recognition software. It may contain minor errors which are inherent in voice recognition technology. **      Final report electronically signed by Dr Juliocesar Rooney on 1/21/2022 5:57 PM      XR CHEST (2 VW)   Final Result   No acute intrathoracic process. **This report has been created using voice recognition software. It may contain minor errors which are inherent in voice recognition technology. **      Final report electronically signed by Dr Dang Chen on 1/21/2022 3:43 PM          Diet: ADULT DIET;  Regular    Koenig: no    Microbiology:  Blood cx 1/21 (-) to date    Influenza 1/21 (-)     COVID 1/21 (+)    U/a 1/21 (-)    Tele review last 24 hrs:  SR - no further svt    DVT prophylaxis: [x] Lovenox                                 [] SCDs                                 [] SQ Heparin                                 [] Encourage ambulation           [] Already on Anticoagulation Disposition:    [x] Home with [facility]       [] TCU       [] Rehab       [] Psych       [] SNF       [] [place]       [] Other-    Code Status: DNR-CCA    PT/OT Eval Status: consulted      Electronically signed by Liseth Harrison MD on 1/25/2022 at 5:39 PM

## 2022-01-25 NOTE — PROGRESS NOTES
[facility] 60  PHYSICAL THERAPY MISSED TREATMENT NOTE  [facility] 6A [facility]    Date: 2022  Patient Name: Tamela Smallwood        MRN: [id number]   : 1933  (80 y.o.)  Gender: female   Referring Practitioner: Miles Win MD  Diagnosis: dehydration         REASON FOR MISSED TREATMENT:  Pt resting in bed on arrival and pt declined any activity. Pt reported that she was having pain in her left LE and didn't feel she could do ex or walking at this time. Ricardo Yee

## 2022-01-25 NOTE — PROGRESS NOTES
[facility] 6A CAPAC[facility]  Occupational Therapy  Daily Note  Time: 1862-4840             Date: 2022  Patient Name: Tamela Smallwood,   Gender: female      [facility]  MRN: [id number]  : 1933  (80 y.o.)  Referring Practitioner: Dr. John Barr MD  Diagnosis: Dehydration  Additional Pertinent Hx: Pt admitted with C/O low BP, sore throat and cough. She has hx of metastatic breast cancer to brain, liver s/p craniotomy for brain mets, temporal arteritis with right eye blindness, CAD, hypothyroidism, anxiety, GERD, carotid stenosis, HLD, chronic back pain, subclavian artery stenosis left, SVT who presented to [facility]. [place] [address] sent in by Artis Rice RN with \"low blood pressure\", cough, +covid exposure, sore throat. Restrictions/Precautions:  Restrictions/Precautions: Fall Risk,Isolation,General Precautions  Position Activity Restriction  Other position/activity restrictions: Droplet plus, COVID-19, blind in R eye     SUBJECTIVE: Patient pleasant and cooperative. Looking forward to going home. PAIN: 0/10:     Vitals: Nurse checked vitals prior to session    COGNITION: WFL    ADL:   Grooming: Contact Guard Assistance. standing at sink to use mouthwash and wash hands. 1 mild LOB BWD with CGA  to recover, otherwise SBA. Lower Extremity Dressing: Contact Guard Assistance. To change briefs, pants, and socks while sitting on toilet. CGA for balance when standing. Toileting: Modified Independent. Toilet Transfer: Contact Guard Assistance. From regular height toilet. BALANCE:  Standing Balance: Contact Guard Assistance. with use of RW. Patient more limited by low vision in unfamiliar room rather than balance. BED MOBILITY:  Supine to Sit: Stand By Assistance      TRANSFERS:  Sit to Stand:  Stand By Assistance. from edge of bed    FUNCTIONAL MOBILITY:  Assistive Device: Rolling Walker  Assist Level:  Contact Guard Assistance. Distance:  To and from bathroom  CGA for low vision in unfamiliar environment rather than balance. ASSESSMENT:     Activity Tolerance:  Patient tolerance of  treatment: good. Discharge Recommendations: Home with assistance of aide  Equipment Recommendations: Equipment Needed: No  Plan: Times per week: 5x  Specific instructions for Next Treatment: Functional mobility; ADLs and endurance training; UE exercises and relaxed breathing technique  Current Treatment Recommendations: Functional Mobility Training,Endurance Training,Self-Care / ADL,Strengthening  Plan Comment: Pt would benefit from continued skilled OT services when medically stable and discharged from Acute. HHOT recommended. Patient Education  Patient Education: Plan of Care    Goals  Short term goals  Time Frame for Short term goals: By discharge  Short term goal 1: Pt will complete lower body ADLs while using any adaptations needed with SBA and verbal cues for energy conservation techniques to increase her independence and tolerance of doing her self care routine. Short term goal 2: Pt will complete toileting routine including transfers to/from the standard toilet seat while using a grabbar with SBA to increase her independence with self care. Short term goal 3: Pt will demonstrate functional mobility walking no/from the bathroom while using a rolling walker with SBA while keeping her O2 saturation > than 90% to prepare for doing self care at the sink. Short term goal 4: Pt will complete BUE ROM/light resistance exercises with verbal cues for steady breathing technique to increase her endurance and strength for ease of doing self care. Following session, patient left in safe position with all fall risk precautions in place.

## 2022-01-26 LAB
ANION GAP SERPL CALCULATED.3IONS-SCNC: 9 MEQ/L (ref 8–16)
BASOPHILS # BLD: 0.2 %
BASOPHILS ABSOLUTE: 0 THOU/MM3 (ref 0–0.1)
BUN BLDV-MCNC: 12 MG/DL (ref 7–22)
C-REACTIVE PROTEIN: 6.03 MG/DL (ref 0–1)
CALCIUM SERPL-MCNC: 7.7 MG/DL (ref 8.5–10.5)
CHLORIDE BLD-SCNC: 103 MEQ/L (ref 98–111)
CO2: 25 MEQ/L (ref 23–33)
CREAT SERPL-MCNC: 0.5 MG/DL (ref 0.4–1.2)
EOSINOPHIL # BLD: 0.2 %
EOSINOPHILS ABSOLUTE: 0 THOU/MM3 (ref 0–0.4)
ERYTHROCYTE [DISTWIDTH] IN BLOOD BY AUTOMATED COUNT: 15.8 % (ref 11.5–14.5)
ERYTHROCYTE [DISTWIDTH] IN BLOOD BY AUTOMATED COUNT: 52.7 FL (ref 35–45)
GFR SERPL CREATININE-BSD FRML MDRD: > 90 ML/MIN/1.73M2
GLUCOSE BLD-MCNC: 82 MG/DL (ref 70–108)
HCT VFR BLD CALC: 39.4 % (ref 37–47)
HEMOGLOBIN: 12.3 GM/DL (ref 12–16)
IMMATURE GRANS (ABS): 0.04 THOU/MM3 (ref 0–0.07)
IMMATURE GRANULOCYTES: 0.9 %
LYMPHOCYTES # BLD: 23.2 %
LYMPHOCYTES ABSOLUTE: 1 THOU/MM3 (ref 1–4.8)
MCH RBC QN AUTO: 28.6 PG (ref 26–33)
MCHC RBC AUTO-ENTMCNC: 31.2 GM/DL (ref 32.2–35.5)
MCV RBC AUTO: 91.6 FL (ref 81–99)
MONOCYTES # BLD: 12.4 %
MONOCYTES ABSOLUTE: 0.5 THOU/MM3 (ref 0.4–1.3)
NUCLEATED RED BLOOD CELLS: 0 /100 WBC
PLATELET # BLD: 110 THOU/MM3 (ref 130–400)
PMV BLD AUTO: 12.2 FL (ref 9.4–12.4)
POTASSIUM SERPL-SCNC: 3.7 MEQ/L (ref 3.5–5.2)
RBC # BLD: 4.3 MILL/MM3 (ref 4.2–5.4)
SEG NEUTROPHILS: 63.1 %
SEGMENTED NEUTROPHILS ABSOLUTE COUNT: 2.7 THOU/MM3 (ref 1.8–7.7)
SODIUM BLD-SCNC: 137 MEQ/L (ref 135–145)
WBC # BLD: 4.3 THOU/MM3 (ref 4.8–10.8)

## 2022-01-26 PROCEDURE — 85025 COMPLETE CBC W/AUTO DIFF WBC: CPT

## 2022-01-26 PROCEDURE — 97530 THERAPEUTIC ACTIVITIES: CPT

## 2022-01-26 PROCEDURE — 80048 BASIC METABOLIC PNL TOTAL CA: CPT

## 2022-01-26 PROCEDURE — 97116 GAIT TRAINING THERAPY: CPT

## 2022-01-26 PROCEDURE — 97535 SELF CARE MNGMENT TRAINING: CPT

## 2022-01-26 PROCEDURE — 6360000002 HC RX W HCPCS: Performed by: FAMILY MEDICINE

## 2022-01-26 PROCEDURE — 86140 C-REACTIVE PROTEIN: CPT

## 2022-01-26 PROCEDURE — 2580000003 HC RX 258: Performed by: FAMILY MEDICINE

## 2022-01-26 PROCEDURE — 1200000000 HC SEMI PRIVATE

## 2022-01-26 PROCEDURE — 6370000000 HC RX 637 (ALT 250 FOR IP): Performed by: FAMILY MEDICINE

## 2022-01-26 PROCEDURE — 36415 COLL VENOUS BLD VENIPUNCTURE: CPT

## 2022-01-26 PROCEDURE — 99239 HOSP IP/OBS DSCHRG MGMT >30: CPT | Performed by: PHYSICIAN ASSISTANT

## 2022-01-26 RX ADMIN — LEVOTHYROXINE SODIUM 100 MCG: 0.1 TABLET ORAL at 04:46

## 2022-01-26 RX ADMIN — TRAZODONE HYDROCHLORIDE 50 MG: 50 TABLET ORAL at 21:54

## 2022-01-26 RX ADMIN — SERTRALINE 50 MG: 50 TABLET, FILM COATED ORAL at 21:55

## 2022-01-26 RX ADMIN — PANTOPRAZOLE SODIUM 40 MG: 40 TABLET, DELAYED RELEASE ORAL at 04:46

## 2022-01-26 RX ADMIN — Medication 50 MG: at 08:21

## 2022-01-26 RX ADMIN — SODIUM CHLORIDE, PRESERVATIVE FREE 10 ML: 5 INJECTION INTRAVENOUS at 21:55

## 2022-01-26 RX ADMIN — ASPIRIN 81 MG: 81 TABLET, COATED ORAL at 08:22

## 2022-01-26 RX ADMIN — ENOXAPARIN SODIUM 30 MG: 100 INJECTION SUBCUTANEOUS at 12:00

## 2022-01-26 RX ADMIN — CETIRIZINE HYDROCHLORIDE 5 MG: 10 TABLET, FILM COATED ORAL at 08:23

## 2022-01-26 RX ADMIN — SODIUM CHLORIDE, PRESERVATIVE FREE 10 ML: 5 INJECTION INTRAVENOUS at 08:22

## 2022-01-26 RX ADMIN — ENOXAPARIN SODIUM 30 MG: 100 INJECTION SUBCUTANEOUS at 21:54

## 2022-01-26 RX ADMIN — ATORVASTATIN CALCIUM 10 MG: 10 TABLET, FILM COATED ORAL at 21:53

## 2022-01-26 RX ADMIN — DOCUSATE SODIUM 100 MG: 100 CAPSULE ORAL at 21:54

## 2022-01-26 RX ADMIN — DOCUSATE SODIUM 100 MG: 100 CAPSULE ORAL at 08:22

## 2022-01-26 RX ADMIN — DEXAMETHASONE 6 MG: 0.5 TABLET ORAL at 08:21

## 2022-01-26 RX ADMIN — OXYCODONE HYDROCHLORIDE AND ACETAMINOPHEN 500 MG: 500 TABLET ORAL at 08:21

## 2022-01-26 RX ADMIN — Medication 3 MG: at 21:54

## 2022-01-26 RX ADMIN — SERTRALINE 50 MG: 50 TABLET, FILM COATED ORAL at 08:22

## 2022-01-26 RX ADMIN — LEVETIRACETAM 500 MG: 500 TABLET, FILM COATED ORAL at 21:53

## 2022-01-26 RX ADMIN — GUAIFENESIN AND DEXTROMETHORPHAN 5 ML: 100; 10 SYRUP ORAL at 21:55

## 2022-01-26 RX ADMIN — LEVETIRACETAM 500 MG: 500 TABLET, FILM COATED ORAL at 08:22

## 2022-01-26 ASSESSMENT — PAIN SCALES - GENERAL
PAINLEVEL_OUTOF10: 0

## 2022-01-26 NOTE — PROGRESS NOTES
[address] [place] [address]  INPATIENT PHYSICAL THERAPY  DAILY NOTE  [facility] 6A [facility] - 6A-008-A  Time In: 0840  Time Out: 5058  Timed Code Treatment Minutes: 45 Minutes  Minutes: 38          Date: 2022  Patient Name: Ray Bryant,  Gender:  female        MRN: [id number]  : 1933  (80 y.o.)     Referring Practitioner: Katya Mcdonough MD  Diagnosis: dehydration  Additional Pertinent Hx: Per EMR \"Latanya Tapia is a 80 y.o. female with metastatic breast cancer to brain, liver s/p craniotomy for brain mets, temporal arteritis with right eye blindness, CAD, hypothyroidism, anxiety, GERD, carotid stenosis, HLD, chronic back pain, subclavian artery stenosis left, SVT who presented to [address] [place] [address] sent in by [facility] RN with \"low blood pressure\", cough, +covid exposure, sore throat. Patient states she started with sore throat and phlegmy cough last night. She states that she went to her sister-in-law's  in [place] last Friday where she thinks she probably was exposed to Jolicloud. She then started with a sore throat and feeling bad last night. She did not take anything over-the-counter for her sore throat. She states she has been able to eat and drink normally however. Today she started to feel little feverish. Her home health nurse visited and noted her BP to be \"low\" thus sent her in for evaluation knowing she was exposed to Jolicloud. She has breast cancer and recently completed her chemotherapy about 3 weeks ago with Dr. Aron Mercado.  She states she tolerated it well. She lives home alone and ambulates with a walker. She denies any falls, lightheaded or dizziness, syncope. She denies any chest pain. She states her cough is nonproductive but does describe it as \"phlegmy\" she has poor vision and is unable to say any definitive color to the sputum. She denies any myalgias. She has chronic leg pain at night. She denies any dysuria, hematuria, diarrhea, abdominal pain, nausea or vomiting. \" Prior Level of Function:  Lives With: Alone  Type of Home: House  Home Layout: One level  Home Access: Stairs to enter without rails  Entrance Stairs - Number of Steps: 1 LEI  Home Equipment: 4 wheeled walker,Wheelchair-manual,Cane   Bathroom Shower/Tub: Tub/Shower unit,Shower chair with back  Bathroom Toilet: Standard  Bathroom Equipment: 3-in-1 commode  Bathroom Accessibility: Accessible    Receives Help From: Family,Other (comment) (Aide helps 2 hrs/day 5x/wk)  ADL Assistance: Needs assistance  Homemaking Assistance: Needs assistance  Homemaking Responsibilities: No  Ambulation Assistance: Independent  Transfer Assistance: Independent  Active : No  Additional Comments: Pt was using a 4 wheeled walker. She took spongebaths on weekends but was taking a shower when the aide was available to help her. She warms up food with the microwave. Her aide does the housekeeping and shopping for her. Pt complete light cooking or completes microwavable meals    Restrictions/Precautions:  Restrictions/Precautions: Fall Risk,Isolation,General Precautions  Position Activity Restriction  Other position/activity restrictions: Droplet plus, COVID-19, blind in R eye     SUBJECTIVE: OK to see pt per nursing. Pt in bed with breakfast untouched with pt reporting she is not feeling well, however, agreeable to PT session, requesting to use the restroom and get in chair. Pt wanting to get washed up as pt in same shirt since Monday. Pt also noted to be coughing and wheezing more during session.      PAIN: denies    Vitals: Oxygen: 2 L of O2 >90% during session    OBJECTIVE:  Bed Mobility:  Rolling to Right: Contact Guard Assistance, X 1, with head of bed raised, with rail, with verbal cues    Supine to Sit: Contact Guard Assistance, X 1, with head of bed raised, with rail, with verbal cues , with increased time for completion  Cues for technique of completion   Transfers:  Sit to Stand: [employer], X 1, cues for hand placement, with verbal cues  Stand to Sit:Contact Guard Assistance, X 1, cues for hand placement, with verbal cues  RW for support, increased time due pt not feeling well and weakness noted. Pt completed from various surfaces and heights. Ambulation:  Contact Guard Assistance, X 1, with cues for safety, with verbal cues , with increased time for completion  Distance: 10 feet x2  Surface: Level Tile  Device:Rolling Walker  Gait Deviations: Forward Flexed Posture, Slow Gillian, Decreased Step Length Bilaterally, Decreased Gait Speed, Mild Path Deviations, Unsteady Gait and Decreased Terminal Knee Extension  Cues for safety with fair safety with obstacle negotiation  Balance:  Static Sitting Balance:  Supervision, to complete toileting  Dynamic Sitting Balance: Supervision, Stand By Assistance, to wash self up in sitting, and to colby and doff socks to complete mobility, assist required  Static Standing Balance: Contact Guard Assistance, X 1, with cues for safety, with verbal cues, RW for support as needed cues for safety   Dynamic Standing Balance: Contact Guard Assistance, X 1, with cues for safety, with verbal cues, to colby brief in standing, intermittent UE support required    Functional Outcome Measures: Completed  AM-PAC Inpatient Mobility Raw Score : 17  AM-PAC Inpatient T-Scale Score : 42.13    ASSESSMENT:  Assessment: Pt required increased time to complete mobility tasks this date. Pt not feeling well this AM, now requiring 2 L of O2 at all times. Pt also noted to have some confusion this date. Activity Tolerance:  Patient tolerance of  treatment: fair.      Equipment Recommendations:Equipment Needed: No  Discharge Recommendations: Continue to assess pending progress, 24 hour assistance or supervision, Home with Home Health PT and Patient would benefit from continued PT at discharge  Plan: Times per week: 5x C  Current Treatment Recommendations: Strengthening,Home Exercise Program,Neuromuscular Re-education,Safety Education & Suzannea Kulwinder Training,Patient/Caregiver Education & Training,Functional Mobility Training,Equipment Evaluation, Education, & procurement,Transfer Training,Gait Training,Stair training    Patient Education  Patient Education: Plan of Care, [employer] Mobility, Equipment Education, Transfers, Gait, Use of [employer], Verbal Exercise Instruction, Activity Pacing, Pursed Lip Breathing,  - Patient Verbalized Understanding, - Patient Requires Continued Education    Goals:  Patient goals : go back home  Short term goals  Time Frame for Short term goals: by discharge  Short term goal 1: Pt will amb with RW for support with S for >50 feet to complete HH distances. Short term goal 2: Pt will demo IND with transfers with good safety and RW for support to progress towards PLOF safely. Short term goal 3: Pt will demo IND with bed mobility tasks with bed flat to return home with increased IND. Short term goal 4: Pt will negotiate 1 step for LEI in the home with RW for support and S for safety to return home safely. Long term goals  Time Frame for Long term goals : NA due to short ELOS    Following session, patient left in safe position with all fall risk precautions in place. Pt in bedside chair following session, chair alarm on, breakfast present.

## 2022-01-26 NOTE — PROGRESS NOTES
A home oxygen evaluation has been completed. [x]Patient is an inpatient. It is expected that the patient will be discharged within the next 48 hours. Qualified provider to write order for home prescription if patient qualifies. Social service/care managers will arrange for home oxygen. If patient is active, arrange for Home Medical supplier to assess for Oxygen Conserving Device per pulse oximetry. []Patient is an outpatient. Results will be faxed to the ordering provider. Qualified provider to write order for home prescription if patient qualifies and arranges for home oxygen. Patient was placed on room air for 20  minutes. SpO2 was 93 % on room air at rest. Patients SpO2 was not below 89% to qualify for home oxygen. Patient can ambulate for exercise flow rate. Patients was ambulated, SpO2 was 87% on room air while ambulating and patient was placed on 2 lpm to maintain an SpO2 of 92% while exercising. Note: For any SpO2 at 95% see policy and procedure for possible qualifications.

## 2022-01-26 NOTE — CARE COORDINATION
1/26/22, 11:16 AM HUGO Pelaez plans home today with niarianna Dubois transporting pt per RN. Await O2 eval.    Requires home Oxygen at 2L/min with exertion; [facility] called and will provide home O2. SW follows. Patient goals/plan/ treatment preferences discussed by  and . Patient goals/plan/ treatment preferences reviewed with patient/ family. Patient/ family verbalize understanding of discharge plan and are in agreement with goal/plan/treatment preferences. Understanding was demonstrated using the teach back method. AVS provided by RN at time of discharge, which includes all necessary medical information pertaining to the patients current course of illness, treatment, post-discharge goals of care, and treatment preferences.         IMM Letter  IMM Letter given to Patient/Family/Significant other/Guardian/POA/by[de-identified] Junior Salinas CM  IMM Letter date given[de-identified] 01/25/22  IMM Letter time given[de-identified] 9681

## 2022-01-26 NOTE — PROGRESS NOTES
[facility] 6A [facility]  Occupational Therapy  Daily Note  Time:   Time In: 936  Time Out: 1003  Timed Code Treatment Minutes: 27 Minutes  Minutes: 27          Date: 2022  Patient Name: Jillian Mauro,   Gender: female      Room: [facility]008-A  MRN: [id number]  : 1933  (80 y.o.)  Referring Practitioner: Dr. Mateusz Roque MD  Diagnosis: Dehydration  Additional Pertinent Hx: Pt admitted with C/O low BP, sore throat and cough. She has hx of metastatic breast cancer to brain, liver s/p craniotomy for brain mets, temporal arteritis with right eye blindness, CAD, hypothyroidism, anxiety, GERD, carotid stenosis, HLD, chronic back pain, subclavian artery stenosis left, SVT who presented to [facility] sent in by [facility] RN with \"low blood pressure\", cough, +covid exposure, sore throat. Restrictions/Precautions:  Restrictions/Precautions: Fall Risk,Isolation,General Precautions  Position Activity Restriction  Other position/activity restrictions: Droplet plus, COVID-19, blind in R eye     SUBJECTIVE: Pt seated in bedside chair drinking coffee upon arrival, agreeable to OT session. PAIN: None    Vitals: Oxygen: Patient on 2 L O2 via Nasal Canula  upon arrival to room. Patient O2 sats at 93%. Upon activity patient dropping O2 at low 80's- however pleth unclear on portable reader. Pt increased to 3-4L O2 to manage symptoms- requiring lengthy rest break(s) to recover. COGNITION: Slow Processing, Decreased Recall, Decreased Insight, Impaired Memory, Decreased Problem Solving, Decreased Safety Awareness and Impaired Attention    ADL:   Grooming: Contact Guard Assistance. Standing using wet wipe to clean hands. Lower Extremity Dressing: Moderate Assistance. ModA to doff soiled clothing over BLE after incontinence of BM. SBA with increased time threading BLE into clean undergarment- CGA for standing balance to manage over hips  Toileting: Maximum Assistance.   For hygiene after BM- pt failed inital attempt cleaning self after loose stool. Toilet Transfer: Minimal Assistance. STS- cues for use of grab bar. Sherryle Moder BALANCE:  Sitting Balance:  Stand By Assistance. Standing Balance: Contact Guard Assistance. x1-2 minutes within ADL    BED MOBILITY:  Not Tested    TRANSFERS:  Sit to Stand:  Contact Guard Assistance, Minimal Assistance  Stand to Sit: Contact Guard Assistance. FUNCTIONAL MOBILITY:  Assistive Device: Rolling Walker   Assist Level:  Contact Guard Assistance and Minimal Assistance. Distance:   Completed functional mobility to/from BR at very slow pace, no LOB noted. Pt requires max cues for walker safety to keep close to QUIRINO and for O2 line awareness. Pt began turning RW in circles within BR stating, \"I'm confused and don't know where I'm going! \" Required seated rest break after trial of mobility, mod fatigue noted. ASSESSMENT:     Activity Tolerance:  Patient tolerance of  treatment: fair. Discharge Recommendations: 24 hour assistance or supervision and Home with Home Health OT  Equipment Recommendations: Equipment Needed: No  Plan: Times per week: 5x  Specific instructions for Next Treatment: Functional mobility; ADLs and endurance training; UE exercises and relaxed breathing technique  Current Treatment Recommendations: Functional Mobility Training,Endurance Training,Self-Care / ADL,Strengthening  Plan Comment: Pt would benefit from continued skilled OT services when medically stable and discharged from Acute. HHOT recommended. Patient Education  Patient Education: ADL's, Orientation, Importance of Increasing Activity, Assistive Device Safety and Safety with transfers and mobility.     Goals  Short term goals  Time Frame for Short term goals: By discharge  Short term goal 1: Pt will complete lower body ADLs while using any adaptations needed with SBA and verbal cues for energy conservation techniques to increase her independence and tolerance of doing her self care routine. Short term goal 2: Pt will complete toileting routine including transfers to/from the standard toilet seat while using a grabbar with SBA to increase her independence with self care. Short term goal 3: Pt will demonstrate functional mobility walking no/from the bathroom while using a rolling walker with SBA while keeping her O2 saturation > than 90% to prepare for doing self care at the sink. Short term goal 4: Pt will complete BUE ROM/light resistance exercises with verbal cues for steady breathing technique to increase her endurance and strength for ease of doing self care. Following session, patient left in safe position with all fall risk precautions in place.

## 2022-01-26 NOTE — DISCHARGE SUMMARY
Hospitalist Discharge Summary        Patient: Cesar Livingston  YOB: 1933  MRN: [id number]   Acct: [de-identified]    Primary Care Physician: Juarez Good MD    Admit date  1/21/2022    Discharge date:  1/26/ 2022    Chief Complaint on presentation :-  COVID    Discharge Diagnosis:  COVID 19   Acute respiratory failure, hypoxia   Metastatic breast cancer with mets to liver, brain s/p debulking/craniotomy 7/2021  Thrombocytopenia   CAD   Pulmonary Nodules   Essential HTN   History of left subclavian artery stenosis with stenting   SVT   HLD   Anxiety   GERD  Hypothyroidism        Initial H and P and Hospital course:-  Cesar Livingston is a 80 y.o. female with metastatic breast cancer to brain, liver s/p craniotomy for brain mets, temporal arteritis with right eye blindness, CAD, hypothyroidism, anxiety, GERD, carotid stenosis, HLD, chronic back pain, subclavian artery stenosis left, SVT who presented to [facility] sent in by [facility] RN with \"low blood pressure\", cough, +covid exposure, sore throat.    In ER, upon arrival her BP was 95/49, 92% on RA, temp 99.5, HR 77.  CBC was unremarkable except for platelets of 541.  TIP was WNL, magnesium 2.1, lactic acid 0.9, procalcitonin 0.06, , ferritin 73.  Influenza negative, COVID positive, UA negative.   CXR showed no acute process, CTA chest with no PE, 2.7 x 1.6 cm soft tissue seen in the axillary tail of the right breast, 2 mm right upper lobe pulmonary nodule, 4 mm LLL nodule, bronchiolar wall thickening seen with the lung bases could be infectious bronchiolitis or inflammatory process, very small right pleural effusion, mildly nodular liver surface, stent in the left proximal subclavian artery with 40-50% stenosis in the proximal left subclavian artery, aortocoronary calcifications mild emphysematous changes.  She was given a 1 L IVF LR bolus in ER and due to her borderline BP, COVID-positive status, and history hospitalist service was contacted for further evaluation and treatment. -- BP improved with IVF in ER - no further IVF given and BP stable;  Developed fever 102 1/22 am.      -- 1/25/2022 -> Pt up doing better, remains on RA and plan was for d/c today however pt unable to obtain ride and for someone to stay with her at d/c and [address] can't come out until tomorrow either. Cont monitor overnight, monitor lytes, renal fxn, BP, resp status. Home in am if remains stable and help at home arranged     -- 1/26/2022 -> Pt's family unable to transport patient home until tomorrow AM. Pt has improved. Home O2 eval showed patient required minimal oxygen support and this was arranged prior to DC. On the day of discharge, it was explained to the patient that it was very important to follow up with his PCP  to have continued care. Appointments were made and information was given. Physical Exam:-  Vitals:   Patient Vitals for the past 24 hrs:   BP Temp Temp src Pulse Resp SpO2   01/26/22 1522 (!) 119/45 98 °F (36.7 °C) Oral 66 18 94 %   01/26/22 1106 (!) 106/47 99.7 °F (37.6 °C) Oral 77 16 92 %   01/26/22 0817 (!) 156/67 100.4 °F (38 °C) Oral 77 18 94 %   01/26/22 0345 (!) 142/50 99 °F (37.2 °C) Oral 69 16 91 %   01/25/22 1956 132/60 99.9 °F (37.7 °C) Oral 79 18 94 %     Weight:   Weight: 134 lb (60.8 kg)   24 hour intake/output:     Intake/Output Summary (Last 24 hours) at 1/26/2022 1731  Last data filed at 1/26/2022 0357  Gross per 24 hour   Intake 220 ml   Output --   Net 220 ml       1. General appearance: Chronically ill appearing white female   2. HEENT: Normal cephalic, atraumatic without obvious deformity. Pupils equal, round, and reactive to light. Extra ocular muscles intact. Conjunctivae/corneas clear. 3. Neck: Supple, with full range of motion. No jugular venous distention. Trachea midline. 4. Respiratory:  Normal respiratory effort. Breath sounds diminished.    5. Cardiovascular: Regular rate and rhythm with normal S1/S2 without murmurs, rubs or gallops. 6. Abdomen: Soft, non-tender, non-distended with normal bowel sounds. 7. Musculoskeletal:  No clubbing, cyanosis or edema bilaterally. 8. Skin: Skin color, texture, turgor normal.  No rashes or lesions. 9. Neurologic:  Neurovascularly intact without any focal sensory/motor deficits. Cranial nerves: II-XII intact, grossly non-focal.  10. Psychiatric: Alert and oriented, thought content appropriate, normal insight  11. Capillary Refill: Brisk,< 3 seconds   12. Peripheral Pulses: +2 palpable, equal bilaterally       Discharge Medications:-      Medication List      START taking these medications    albuterol sulfate  (90 Base) MCG/ACT inhaler  Inhale 2 puffs into the lungs every 6 hours as needed for Wheezing or Shortness of Breath     dexamethasone 6 MG tablet  Commonly known as: DECADRON  Take 1 tablet by mouth daily for 7 days     zinc sulfate 220 (50 Zn) MG capsule  Commonly known as: ZINCATE  Take 1 capsule by mouth daily        CHANGE how you take these medications    isosorbide mononitrate 30 MG extended release tablet  Commonly known as: IMDUR  -- HOLD until f/u with PCP  What changed: See the new instructions. predniSONE 1 MG tablet  Commonly known as: DELTASONE  Take 1 tablet by mouth daily - resume after completes decadron  What changed: additional instructions        CONTINUE taking these medications    ALPRAZolam 0.25 MG tablet  Commonly known as: XANAX  Take 1 tablet by mouth 2 times daily as needed for Anxiety for up to 90 days.      Centrum Silver Adult 50+ Tabs  Take 1 tablet by mouth daily     docusate 100 MG Caps  Commonly known as: COLACE, DULCOLAX  Take 100 mg by mouth 2 times daily     Ensure Original Liqd  Take 1 Can by mouth 2 times daily     levETIRAcetam 500 MG tablet  Commonly known as: KEPPRA  take 1 tablet by mouth twice a day     levothyroxine 100 MCG tablet  Commonly known as: SYNTHROID  1 tablet daily     lidocaine 4 % external patch  Apply 2 patches topically daily     loratadine 10 MG tablet  Commonly known as: CLARITIN  take 1 tablet by mouth once daily     melatonin 3 MG Tabs tablet  Take 1 tablet by mouth nightly as needed (insomnia)     ondansetron 4 MG disintegrating tablet  Commonly known as: ZOFRAN-ODT     Oyster Shell Calcium/D 500-200 MG-UNIT Tabs  take 1 tablet by mouth twice a day     pantoprazole 40 MG tablet  Commonly known as: PROTONIX  take 1 tablet by mouth once daily     promethazine 25 MG tablet  Commonly known as: PHENERGAN  Take 1 tablet by mouth every 6 hours as needed for Nausea (and vomiting)     RA Aspirin EC Adult Low St 81 MG EC tablet  Generic drug: aspirin     RA Biotin 1000 MCG Tabs  Generic drug: Biotin     Refresh Plus 0.5 % Soln ophthalmic solution  Generic drug: carboxymethylcellulose PF     sennosides-docusate sodium 8.6-50 MG tablet  Commonly known as: SENOKOT-S  Take 1-2 tablets by mouth daily as needed for Constipation     sertraline 50 MG tablet  Commonly known as: ZOLOFT  take 1 tablet by mouth twice a day     simvastatin 20 MG tablet  Commonly known as: ZOCOR  take 1 tablet by mouth every evening     traZODone 50 MG tablet  Commonly known as: DESYREL  take 1 tablet by mouth at bedtime if needed for sleep     vitamin D 1.25 MG (69497 UT) Caps capsule  Commonly known as: ERGOCALCIFEROL        STOP taking these medications    hydrALAZINE 25 MG tablet  Commonly known as: APRESOLINE           Where to Get Your Medications      These medications were sent to [address] [place]  [address]    Phone: [phone]   · albuterol sulfate  (90 Base) MCG/ACT inhaler  · dexamethasone 6 MG tablet     You can get these medications from any pharmacy    You don't need a prescription for these medications  · zinc sulfate 220 (50 Zn) MG capsule     Information about where to get these medications is not yet available    Ask your nurse or doctor about these medications  · isosorbide mononitrate 30 MG extended release tablet  · predniSONE 1 MG tablet          Labs :-  Recent Results (from the past 72 hour(s))   C-Reactive Protein    Collection Time: 01/24/22  6:49 AM   Result Value Ref Range    CRP 11.35 (H) 0.00 - 1.00 mg/dl   Basic Metabolic Panel    Collection Time: 01/24/22  6:49 AM   Result Value Ref Range    Sodium 139 135 - 145 meq/L    Potassium 4.4 3.5 - 5.2 meq/L    Chloride 104 98 - 111 meq/L    CO2 26 23 - 33 meq/L    Glucose 163 (H) 70 - 108 mg/dL    BUN 15 7 - 22 mg/dL    CREATININE 0.5 0.4 - 1.2 mg/dL    Calcium 8.3 (L) 8.5 - 10.5 mg/dL   CBC auto differential    Collection Time: 01/24/22  6:49 AM   Result Value Ref Range    WBC 4.6 (L) 4.8 - 10.8 thou/mm3    RBC 5.00 4.20 - 5.40 mill/mm3    Hemoglobin 14.9 12.0 - 16.0 gm/dl    Hematocrit 46.3 37.0 - 47.0 %    MCV 92.6 81.0 - 99.0 fL    MCH 29.8 26.0 - 33.0 pg    MCHC 32.2 32.2 - 35.5 gm/dl    RDW-CV 16.1 (H) 11.5 - 14.5 %    RDW-SD 54.7 (H) 35.0 - 45.0 fL    Platelets 92 (L) 601 - 400 thou/mm3    MPV 11.8 9.4 - 12.4 fL    Seg Neutrophils 79.0 %    Lymphocytes 17.5 %    Monocytes 2.8 %    Eosinophils 0.0 %    Basophils 0.0 %    Immature Granulocytes 0.7 %    Segs Absolute 3.6 1.8 - 7.7 thou/mm3    Lymphocytes Absolute 0.8 (L) 1.0 - 4.8 thou/mm3    Monocytes Absolute 0.1 (L) 0.4 - 1.3 thou/mm3    Eosinophils Absolute 0.0 0.0 - 0.4 thou/mm3    Basophils Absolute 0.0 0.0 - 0.1 thou/mm3    Immature Grans (Abs) 0.03 0.00 - 0.07 thou/mm3    nRBC 0 /100 wbc   Magnesium    Collection Time: 01/24/22  6:49 AM   Result Value Ref Range    Magnesium 2.2 1.6 - 2.4 mg/dL   Anion Gap    Collection Time: 01/24/22  6:49 AM   Result Value Ref Range    Anion Gap 9.0 8.0 - 16.0 meq/L   Glomerular Filtration Rate, Estimated    Collection Time: 01/24/22  6:49 AM   Result Value Ref Range    Est, Glom Filt Rate >90 YN/MARIA ESTHER/0.86K1   Basic Metabolic Panel    Collection Time: 01/25/22  5:39 AM 1.00 mg/dl   Anion Gap    Collection Time: 01/26/22  7:43 AM   Result Value Ref Range    Anion Gap 9.0 8.0 - 16.0 meq/L   Glomerular Filtration Rate, Estimated    Collection Time: 01/26/22  7:43 AM   Result Value Ref Range    Est, Glom Filt Rate >90 ml/min/1.73m2        Microbiology:    Blood culture #1:   Lab Results   Component Value Date    BC No growth-preliminary  01/21/2022       Blood culture #2:No results found for: BLOODCULT2    Organism:    Lab Results   Component Value Date    LABGRAM  12/06/2016     No segmented neutrophils observed. Rare epithelial cells observed. No organisms observed. MRSA culture only:No results found for: [facility]    Urine culture:   Lab Results   Component Value Date    LABURIN No growth-preliminary No growth  07/02/2021     Lab Results   Component Value Date    ORG Staphylococcus (coagulase negative) 10/29/2021        Respiratory culture: No results found for: CULTRESP    Aerobic and Anaerobic :  Lab Results   Component Value Date    LABAERO  12/06/2016     No growth-preliminary  Culture yielded very light growth of Staphylococcus sp  (coagulase negative) and gram positive bacilli consistent  with Corynebacterium sp. Culture results suggestive of  surface kate when viewed in conjunction with direct gram  stain results. Lab Results   Component Value Date    LABANAE  11/04/2016     No anaerobes isolated- preliminary  No anaerobes isolated         Urinalysis:      Lab Results   Component Value Date    NITRU NEGATIVE 01/21/2022    WBCUA NONE SEEN 01/21/2022    BACTERIA NONE SEEN 01/21/2022    RBCUA 0-2 01/21/2022    BLOODU SMALL 01/21/2022    SPECGRAV 1.011 10/29/2021    GLUCOSEU NEGATIVE 01/21/2022       Radiology:-  XR CHEST (2 VW)    Result Date: 1/21/2022  PROCEDURE: XR CHEST (2 VW) CLINICAL INFORMATION: Chest pain. Shortness of breath. Cough. COMPARISON: Chest x-ray 7/1/2021. TECHNIQUE: PA and lateral views of the chest performed.  FINDINGS: Lines/tubes: A vascular stent at the left subclavian artery origin is stable. Heart/mediastinum: The heart size and mediastinal contours are stable. Mild cardiomegaly is unchanged. The pulmonary vascularity is unremarkable. Lungs: No focal consolidation, pleural effusion, or pneumothorax is observed pulmonary hyperinflation is unchanged. Bones: Diffuse osteopenia is present. The visualized skeletal structures appear intact. No acute intrathoracic process. **This report has been created using voice recognition software. It may contain minor errors which are inherent in voice recognition technology. ** Final report electronically signed by Dr Rudy Orta on 1/21/2022 3:43 PM    CTA Chest W WO Contrast    Result Date: 1/21/2022  PROCEDURE: CTA CHEST W WO CONTRAST CLINICAL INFORMATION: Hypotension, , Covid 19, PE COMPARISON: CT angiography chest 9/3/7839 TECHNIQUE: Helical CT angiography of the chest was obtained with intravenous contrast. Multiplanar reformats are provided. PE protocol was utilized. 1  mm and 3  mm axial images were provided through the chest after the administration of IV contrast. A non-contrast  localizer was obtained. 10 mm MIP reconstructions of the chest performed in coronal and sagittal planes All CT scans at this facility use dose modulation, iterative reconstruction, and/or weight based dosing when appropriate to reduce the radiation dose to as low as reasonably achievable. CONTRAST: 80  cc of Isovue-370  intravenously FINDINGS: There is adequate opacification of the main pulmonary artery and its branches. . No CT evidence of pulmonary embolus . A stent in the proximal left subclavian artery is seen. There is about 40-50% stenosis in the proximal left subclavian artery. A small hiatal hernia is seen. Aortocoronary calcifications. Mild emphysematous changes. A 2 mm right upper lobe pulmonary nodule (series 4, image 64). 4 mm left lung base pulmonary nodule (image 173).  Bronchiolar wall thickening is seen in the lung bases. No focal pulmonary consolidation. No large pulmonary mass. Central airway is patent. Very small right pleural effusion. No significant pericardial effusion. The heart is not enlarged. Ascending thoracic aorta is not dilated. The main pulmonary artery is not dilated. No significantly enlarged mediastinal or  axillary lymph node. There is a 2.7 x 1.6 cm soft tissue in the axillary tail of the right breast. The thyroid is not enlarged. Limited evaluation below the diaphragm show that the gallbladder is surgically absent. Mildly nodular liver surface morphology. Bilateral adrenal hyperplasia. . Bones: Degenerative changes of the thoracic spine. The bones are demineralized. .     1. No CT evidence of pulmonary embolus. 2. A 2.7 x 1.6 cm soft tissue seen in the axillary tail of the right breast. Correlate with mammography and physical exam. 3. 2 mm right upper lobe pulmonary nodule. 4 mm left lower lobe pulmonary nodule. 4. Bronchiolar wall thickening seen in the lung bases. This can relate to infectious bronchiolitis or inflammatory process. 5. Very small right pleural effusion. 6. Mildly nodular liver surface morphology is a nonspecific finding. It can be seen with chronic liver disease or cirrhosis. Clinical correlation is recommended. 7. Other findings as described above. **This report has been created using voice recognition software. It may contain minor errors which are inherent in voice recognition technology. ** Final report electronically signed by Dr Brian Sanchez on 1/21/2022 5:57 PM       Follow-up scheduled after discharge :-    in the next few days with Jeanette Bowles MD    Consultations during this hospital stay:-  [] NONE [] Cardiology  [] Nephrology  [] Hemo onco   [] GI   [] ID  [] Endocrine  [] Pulm    [] Neuro    [] Psych   [] Urology  [] ENT   [] G SURGERY   []Ortho    []CV surg    [] Palliative  [] Hospice [] Pain management   []    []TCU   [] PT/OT  OTHERS:-    Disposition: home  Condition at Discharge: Stable    Time Spent:- 40 minutes    Electronically signed by EMELYN Benitez on 1/26/22 at 5:31 PM EST   Discharging Hospitalist

## 2022-01-26 NOTE — CARE COORDINATION
1/26/22, 3:55 PM EST    DISCHARGE PLANNING EVALUATION    Spoke with patient's niece Leyla Hendrix and her spouse and they are unable to come and get patient as they now have COVID. They are okay with patient going home alone. Called and spoke with niece Yaz Yepez also and they are okay with being discharged home as neither one of them want patient to go to an ECF. Patient is in agreement with plan also. Awaiting setting up transportation for patient with [facility].

## 2022-01-26 NOTE — PROGRESS NOTES
Patient was evaluated today for the diagnosis of COVID-19. I entered a DME order for home oxygen because the diagnosis and testing requires the patient to have supplemental oxygen. Condition will improve or be benefited by oxygen use. The patient is  able to perform good mobility in a home setting and therefore does require the use of a portable oxygen system. The need for this equipment was discussed with the patient and she understands and is in agreement.     Electronically signed by Elinor Fothergill, PA on 1/26/2022 at 12:24 PM

## 2022-01-26 NOTE — PROGRESS NOTES
Spoke with Karla Solomon. Family was attempted several times and unable to reach them.  was able to get [employer] and [facility] and they are not able to  patient tonight. Transport set up for tomorrow with [facility] at 12:00 pm. Home O2 at bedside for patient to take home.

## 2022-01-27 VITALS
BODY MASS INDEX: 26.31 KG/M2 | OXYGEN SATURATION: 96 % | WEIGHT: 134 LBS | HEIGHT: 60 IN | DIASTOLIC BLOOD PRESSURE: 58 MMHG | TEMPERATURE: 98.9 F | SYSTOLIC BLOOD PRESSURE: 124 MMHG | HEART RATE: 68 BPM | RESPIRATION RATE: 18 BRPM

## 2022-01-27 LAB
ANION GAP SERPL CALCULATED.3IONS-SCNC: 9 MEQ/L (ref 8–16)
BLOOD CULTURE, ROUTINE: NORMAL
BLOOD CULTURE, ROUTINE: NORMAL
BUN BLDV-MCNC: 13 MG/DL (ref 7–22)
CALCIUM SERPL-MCNC: 7.7 MG/DL (ref 8.5–10.5)
CHLORIDE BLD-SCNC: 103 MEQ/L (ref 98–111)
CO2: 26 MEQ/L (ref 23–33)
CREAT SERPL-MCNC: 0.6 MG/DL (ref 0.4–1.2)
GFR SERPL CREATININE-BSD FRML MDRD: > 90 ML/MIN/1.73M2
GLUCOSE BLD-MCNC: 85 MG/DL (ref 70–108)
POTASSIUM SERPL-SCNC: 3.7 MEQ/L (ref 3.5–5.2)
SODIUM BLD-SCNC: 138 MEQ/L (ref 135–145)

## 2022-01-27 PROCEDURE — 36415 COLL VENOUS BLD VENIPUNCTURE: CPT

## 2022-01-27 PROCEDURE — 2580000003 HC RX 258: Performed by: FAMILY MEDICINE

## 2022-01-27 PROCEDURE — 6360000002 HC RX W HCPCS: Performed by: FAMILY MEDICINE

## 2022-01-27 PROCEDURE — 80048 BASIC METABOLIC PNL TOTAL CA: CPT

## 2022-01-27 PROCEDURE — 6370000000 HC RX 637 (ALT 250 FOR IP): Performed by: FAMILY MEDICINE

## 2022-01-27 RX ADMIN — ENOXAPARIN SODIUM 30 MG: 100 INJECTION SUBCUTANEOUS at 10:31

## 2022-01-27 RX ADMIN — CETIRIZINE HYDROCHLORIDE 5 MG: 10 TABLET, FILM COATED ORAL at 08:25

## 2022-01-27 RX ADMIN — SERTRALINE 50 MG: 50 TABLET, FILM COATED ORAL at 08:24

## 2022-01-27 RX ADMIN — OXYCODONE HYDROCHLORIDE AND ACETAMINOPHEN 500 MG: 500 TABLET ORAL at 08:25

## 2022-01-27 RX ADMIN — LEVETIRACETAM 500 MG: 500 TABLET, FILM COATED ORAL at 08:25

## 2022-01-27 RX ADMIN — DOCUSATE SODIUM 100 MG: 100 CAPSULE ORAL at 08:25

## 2022-01-27 RX ADMIN — ASPIRIN 81 MG: 81 TABLET, COATED ORAL at 08:25

## 2022-01-27 RX ADMIN — SODIUM CHLORIDE, PRESERVATIVE FREE 10 ML: 5 INJECTION INTRAVENOUS at 08:24

## 2022-01-27 RX ADMIN — Medication 50 MG: at 08:25

## 2022-01-27 RX ADMIN — ACETAMINOPHEN 650 MG: 325 TABLET ORAL at 04:35

## 2022-01-27 RX ADMIN — PANTOPRAZOLE SODIUM 40 MG: 40 TABLET, DELAYED RELEASE ORAL at 08:26

## 2022-01-27 RX ADMIN — LEVOTHYROXINE SODIUM 100 MCG: 0.1 TABLET ORAL at 08:24

## 2022-01-27 RX ADMIN — DEXAMETHASONE 6 MG: 0.5 TABLET ORAL at 08:24

## 2022-01-27 ASSESSMENT — PAIN SCALES - GENERAL: PAINLEVEL_OUTOF10: 3

## 2022-01-27 NOTE — CARE COORDINATION
1/27/22, 12:43 PM EST    Patient goals/plan/ treatment preferences discussed by  and . Patient goals/plan/ treatment preferences reviewed with patient/ family. Patient/ family verbalize understanding of discharge plan and are in agreement with goal/plan/treatment preferences. Understanding was demonstrated using the teach back method. AVS provided by RN at time of discharge, which includes all necessary medical information pertaining to the patients current course of illness, treatment, post-discharge goals of care, and treatment preferences. Services After Discharge  Services At/After Discharge: PT,OT,Nursing Services,In cab,Aide Services [facility])   IMM Letter  IMM Letter given to Patient/Family/Significant other/Guardian/POA/by[de-identified] Ryan Dunn CM  IMM Letter date given[de-identified] 01/25/22  IMM Letter time given[de-identified] 7581       Call from nursing, reports patient does not having closing, SW able to get some through [employer] for pt. Pharmacy closed and unable to do meds to beds, transport moved to 2pm. SW did call pt's [address], as nurse reports pt reports aide will be there. Teena Turner reports she will not and pt aware of this. Call to stanley Aiden Maravilla, reports she is still in [place] as she has COVID now too. Aiden Maravilla reports patient will be okay in her home alone with her services. ALEX did call [facility] this afternoon, updated on change in transport, they were planning on seeing pt at her home to help her in, they will see if they can make 2pm work. [facility] reports they have all needed paperwork.

## 2022-01-27 NOTE — TELEPHONE ENCOUNTER
Airam from [facility] called stating that linsey doesn't have any zinc sulfate (ZINCATE) 220 (50 Zn) MG capsule

## 2022-01-27 NOTE — TELEPHONE ENCOUNTER
Date of last visit:  8/3/2021  Date of next visit:  2/3/2022    Requested Prescriptions     Pending Prescriptions Disp Refills    zinc sulfate (ZINCATE) 220 (50 Zn) MG capsule 30 capsule 0     Sig: Take 1 capsule by mouth daily

## 2022-01-27 NOTE — CARE COORDINATION
Phone message left with [facility]  [address] Dept of patient's discharge to home and + for T[place].  Electronically signed by Robbin No RN on 1/27/22 at 3:34 PM EST

## 2022-01-28 ENCOUNTER — CARE COORDINATION (OUTPATIENT)
Dept: CASE MANAGEMENT | Age: 87
End: 2022-01-28

## 2022-01-28 RX ORDER — ZINC SULFATE 50(220)MG
50 CAPSULE ORAL DAILY
Qty: 30 CAPSULE | Refills: 0 | COMMUNITY
Start: 2022-01-28 | End: 2022-08-24

## 2022-01-28 NOTE — CARE COORDINATION
Care Transitions Outreach Attempt    Call within 2 business days of discharge: Yes   Patient: Mckenna Ashford Patient : 1933 MRN: <[id number]>  Facility: [facility]       Complaint Diagnosis Description Type Department Provider    22 Concern For COVID-19 COVID-19 . .. ED to Hosp-Admission (Discharged) (ADMITTED) STRZ 6A [facility]ouzia Hillman MD; [facility]. .. Noted following upcoming appointments from discharge chart review:   [facility] follow up appointment(s):   Future Appointments   Date Time Provider Irene Shah   2/3/2022 11:30 AM Severiano Hahn MD [facility]     1st attempt to reach for Covid 19 Monitoring discharge call unsuccessful. HIPAA compliant message left requesting call back.    [address] [place] [address] [phone]

## 2022-01-31 ENCOUNTER — CARE COORDINATION (OUTPATIENT)
Dept: CASE MANAGEMENT | Age: 87
End: 2022-01-31

## 2022-01-31 NOTE — CARE COORDINATION
Care Transitions Outreach Attempt    Call within 2 business days of discharge: Yes   Patient: Gail Cesar Patient : 1933 MRN: <[id number]>  Facility: [address] [place] [address]       Complaint Diagnosis Description Type Department Provider    22 Concern For COVID-19 COVID-19 . .. ED to Hosp-Admission (Discharged) (ADMITTED) STRZ 6A Yolanda Sánchez MD; Robin Canales To. .. Noted following upcoming appointments from discharge chart review:   [facility] follow up appointment(s):   Future Appointments   Date Time Provider Irene Shah   2/3/2022 11:30 AM Chele Gonzalez MD [facility]       2nd unsuccessful attempt to reach for Covid 19 Monitoring discharge call. HIPAA compliant message left requesting call back. Episode closed per protocol, no further outreach scheduled.    [address] [place] [address] [phone]

## 2022-02-02 ENCOUNTER — VIRTUAL VISIT (OUTPATIENT)
Dept: FAMILY MEDICINE CLINIC | Age: 87
End: 2022-02-02

## 2022-02-02 DIAGNOSIS — U07.1 COVID-19 VIRUS INFECTION: Primary | ICD-10-CM

## 2022-02-02 DIAGNOSIS — Z74.09 IMPAIRED FUNCTIONAL MOBILITY, BALANCE, GAIT, AND ENDURANCE: ICD-10-CM

## 2022-02-02 DIAGNOSIS — R53.1 GENERALIZED WEAKNESS: ICD-10-CM

## 2022-02-02 DIAGNOSIS — I10 ESSENTIAL HYPERTENSION: ICD-10-CM

## 2022-02-02 PROCEDURE — 99443 PR PHYS/QHP TELEPHONE EVALUATION 21-30 MIN: CPT | Performed by: FAMILY MEDICINE

## 2022-02-02 RX ORDER — BENZONATATE 200 MG/1
200 CAPSULE ORAL 3 TIMES DAILY PRN
Qty: 30 CAPSULE | Refills: 0 | Status: SHIPPED | OUTPATIENT
Start: 2022-02-02 | End: 2022-02-09

## 2022-02-02 ASSESSMENT — ENCOUNTER SYMPTOMS
ABDOMINAL PAIN: 0
WHEEZING: 0
CHEST TIGHTNESS: 0
COUGH: 1
SHORTNESS OF BREATH: 0
SORE THROAT: 0
EYE PAIN: 0
CONSTIPATION: 0
NAUSEA: 0

## 2022-02-02 NOTE — PROGRESS NOTES
Pt is all ready and checked in for her telephone visit with Dr. Geradine Fothergill. She stated understanding that she is to call the office with any future problems she has.

## 2022-02-02 NOTE — PROGRESS NOTES
Post-Discharge Transitional Care Management Services or Hospital Follow Up      Latanya Rod   YOB: 1933    Date of Office Visit:  2/2/2022  Date of Hospital Admission: 1/21/22  Date of Hospital Discharge: 1/27/22  Readmission Risk Score(high >=14%.  Medium >=10%):Readmission Risk Score: 19.6 ( )      Care management risk score Rising risk (score 2-5) and Complex Care (Scores >=6): 4     Non face to face  following discharge, date last encounter closed (first attempt may have been earlier): 1/31/2022 11:24 AM 1/31/2022 11:24 AM    Call initiated 2 business days of discharge: Yes     Patient Active Problem List   Diagnosis    Hypothyroidism    GERD (gastroesophageal reflux disease)    Temporal arteritis ([address]    Hyperlipidemia    Hypothyroidism due to acquired atrophy of thyroid    Steroid-induced hyperglycemia    Blind right eye    Current chronic use of systemic steroids    Personal history of cardiac dysrhythmia    Coronary artery disease involving native heart without angina pectoris    Anxiety disorder    H/O: CVA (cerebrovascular accident)    Lumbosacral spinal stenosis    Vitamin D deficiency    Hx of breast cancer with liver mets    Breast cancer metastasized to liver ([address]    Traumatic ecchymosis of forehead    Brain mass    Platelet inhibition due to Plavix    Fall at home, initial encounter    Brain edema ([address]    Intraparenchymal hemorrhage of brain ([address]    Closed head injury    Metastatic breast cancer ([address]    Status post craniotomy    Impaired functional mobility, balance, gait, and endurance    Acute cystitis without hematuria    History of craniotomy    Generalized weakness    COVID-19    Hypotension    Pharyngitis    Thrombocytopenia (HCC)    Pulmonary nodules    Essential hypertension    Stenosis of left subclavian artery (HCC)    COVID-19 virus infection       Allergies   Allergen Reactions    Bactrim [Sulfamethoxazole-Trimethoprim] Swelling Of tongue    Demerol Rash     nausea    Pcn [Penicillins] Rash       Medications listed as ordered at the time of discharge from hospital     Medication List          Accurate as of February 2, 2022  1:41 PM. If you have any questions, ask your nurse or doctor. CONTINUE taking these medications    albuterol sulfate  (90 Base) MCG/ACT inhaler  Inhale 2 puffs into the lungs every 6 hours as needed for Wheezing or Shortness of Breath     ALPRAZolam 0.25 MG tablet  Commonly known as: XANAX  Take 1 tablet by mouth 2 times daily as needed for Anxiety for up to 90 days.      Centrum Silver Adult 50+ Tabs  Take 1 tablet by mouth daily     docusate 100 MG Caps  Commonly known as: COLACE, DULCOLAX  Take 100 mg by mouth 2 times daily     Ensure Original Liqd  Take 1 Can by mouth 2 times daily     isosorbide mononitrate 30 MG extended release tablet  Commonly known as: IMDUR  -- HOLD until f/u with PCP     levETIRAcetam 500 MG tablet  Commonly known as: KEPPRA  take 1 tablet by mouth twice a day     levothyroxine 100 MCG tablet  Commonly known as: SYNTHROID  1 tablet daily     lidocaine 4 % external patch  Apply 2 patches topically daily     loratadine 10 MG tablet  Commonly known as: CLARITIN  take 1 tablet by mouth once daily     melatonin 3 MG Tabs tablet  Take 1 tablet by mouth nightly as needed (insomnia)     ondansetron 4 MG disintegrating tablet  Commonly known as: ZOFRAN-ODT     Oyster Shell Calcium/D 500-200 MG-UNIT Tabs  take 1 tablet by mouth twice a day     pantoprazole 40 MG tablet  Commonly known as: PROTONIX  take 1 tablet by mouth once daily     predniSONE 1 MG tablet  Commonly known as: DELTASONE  Take 1 tablet by mouth daily - resume after completes decadron     promethazine 25 MG tablet  Commonly known as: PHENERGAN  Take 1 tablet by mouth every 6 hours as needed for Nausea (and vomiting)     RA Aspirin EC Adult Low St 81 MG EC tablet  Generic drug: aspirin     RA Biotin 1000 MCG Tabs  Generic drug: Biotin     Refresh Plus 0.5 % Soln ophthalmic solution  Generic drug: carboxymethylcellulose PF     sennosides-docusate sodium 8.6-50 MG tablet  Commonly known as: SENOKOT-S  Take 1-2 tablets by mouth daily as needed for Constipation     sertraline 50 MG tablet  Commonly known as: ZOLOFT  take 1 tablet by mouth twice a day     simvastatin 20 MG tablet  Commonly known as: ZOCOR  take 1 tablet by mouth every evening     traZODone 50 MG tablet  Commonly known as: DESYREL  take 1 tablet by mouth at bedtime if needed for sleep     vitamin D 1.25 MG (70452 UT) Caps capsule  Commonly known as: ERGOCALCIFEROL     zinc sulfate 220 (50 Zn) MG capsule  Commonly known as: ZINCATE  Take 1 capsule by mouth daily              Medications marked \"taking\" at this time  Outpatient Medications Marked as Taking for the 2/2/22 encounter (Virtual Visit) with Danielle Retana MD   Medication Sig Dispense Refill    zinc sulfate (ZINCATE) 220 (50 Zn) MG capsule Take 1 capsule by mouth daily 30 capsule 0    isosorbide mononitrate (IMDUR) 30 MG extended release tablet -- HOLD until f/u with PCP 90 tablet 1    albuterol sulfate  (90 Base) MCG/ACT inhaler Inhale 2 puffs into the lungs every 6 hours as needed for Wheezing or Shortness of Breath 18 g 3    predniSONE (DELTASONE) 1 MG tablet Take 1 tablet by mouth daily - resume after completes decadron 60 tablet 2    ALPRAZolam (XANAX) 0.25 MG tablet Take 1 tablet by mouth 2 times daily as needed for Anxiety for up to 90 days.  30 tablet 2    sennosides-docusate sodium (SENOKOT-S) 8.6-50 MG tablet Take 1-2 tablets by mouth daily as needed for Constipation 60 tablet 1    simvastatin (ZOCOR) 20 MG tablet take 1 tablet by mouth every evening 90 tablet 0    levETIRAcetam (KEPPRA) 500 MG tablet take 1 tablet by mouth twice a day 240 tablet 2    loratadine (CLARITIN) 10 MG tablet take 1 tablet by mouth once daily 90 tablet 1    levothyroxine (SYNTHROID) 100 MCG tablet 1 tablet daily 90 tablet 1    REFRESH PLUS 0.5 % SOLN ophthalmic solution use as directed      ondansetron (ZOFRAN-ODT) 4 MG disintegrating tablet Take 4 mg by mouth every 8 hours as needed      traZODone (DESYREL) 50 MG tablet take 1 tablet by mouth at bedtime if needed for sleep 30 tablet 5    lidocaine 4 % external patch Apply 2 patches topically daily 3 box 1    docusate sodium (COLACE, DULCOLAX) 100 MG CAPS Take 100 mg by mouth 2 times daily 60 capsule 1    melatonin 3 MG TABS tablet Take 1 tablet by mouth nightly as needed (insomnia) 30 tablet 3    RA BIOTIN 1000 MCG TABS Take 1,000 mcg by mouth daily       Nutritional Supplements (ENSURE ORIGINAL) LIQD Take 1 Can by mouth 2 times daily 60 Bottle 5    pantoprazole (PROTONIX) 40 MG tablet take 1 tablet by mouth once daily 90 tablet 1    sertraline (ZOLOFT) 50 MG tablet take 1 tablet by mouth twice a day 180 tablet 1    Calcium Carbonate-Vitamin D (OYSTER SHELL CALCIUM/D) 500-200 MG-UNIT TABS take 1 tablet by mouth twice a day 180 tablet 3    RA ASPIRIN EC ADULT LOW ST 81 MG EC tablet take 1 tablet by mouth once daily      vitamin D (ERGOCALCIFEROL) 1.25 MG (21871 UT) CAPS capsule take 1 capsule by mouth every week      promethazine (PHENERGAN) 25 MG tablet Take 1 tablet by mouth every 6 hours as needed for Nausea (and vomiting) 12 tablet 0    Multiple Vitamins-Minerals (CENTRUM SILVER ADULT 50+) TABS Take 1 tablet by mouth daily 90 tablet 1        Medications patient taking as of now reconciled against medications ordered at time of hospital discharge: Yes    Chief Complaint   Patient presents with    Follow-Up from [address] [place] [address] Other     pt would like to get some robitussin dm with honey. HPI  In the hospital for 6 days with Covid an  No fever.  There is no nausea or vomiting and her   stools and urine normal.   Cough  Still and  Just  Feel  Weak    Inpatient course: Discharge summary reviewed- see chart.    Interval history/Current status: stable  And better  Overall     Review of Systems   Constitutional: Positive for fatigue. Negative for appetite change and fever. HENT: Negative for congestion, ear pain, postnasal drip and sore throat. Eyes: Negative for pain and visual disturbance. Respiratory: Positive for cough. Negative for chest tightness, shortness of breath and wheezing. Cardiovascular: Negative for chest pain, palpitations and leg swelling. Gastrointestinal: Negative for abdominal pain, constipation and nausea. Genitourinary: Negative for dysuria and frequency. Musculoskeletal: Negative for arthralgias, joint swelling, neck pain and neck stiffness. Skin: Negative for rash. Neurological: Positive for weakness. Negative for dizziness, numbness and headaches. Hematological: Negative for adenopathy. Does not bruise/bleed easily. Psychiatric/Behavioral: Negative for behavioral problems and sleep disturbance. The patient is not nervous/anxious. There were no vitals filed for this visit. There is no height or weight on file to calculate BMI. Wt Readings from Last 3 Encounters:   01/22/22 134 lb (60.8 kg)   10/31/21 142 lb 4.8 oz (64.5 kg)   09/03/21 135 lb 6 oz (61.4 kg)     BP Readings from Last 3 Encounters:   01/27/22 (!) 124/58   11/01/21 (!) 182/74   09/03/21 (!) 108/52       Physical Exam      Audio appt  only     Assessment     Diagnosis Orders   1. COVID-19 virus infection     2. Essential hypertension     3. Impaired functional mobility, balance, gait, and endurance     4.  Generalized weakness           /Plan:  Current Outpatient Medications   Medication Sig Dispense Refill    benzonatate (TESSALON) 200 MG capsule Take 1 capsule by mouth 3 times daily as needed for Cough 30 capsule 0    zinc sulfate (ZINCATE) 220 (50 Zn) MG capsule Take 1 capsule by mouth daily 30 capsule 0    isosorbide mononitrate (IMDUR) 30 MG extended release tablet -- HOLD until f/u with PCP 90 tablet 1    albuterol sulfate  (90 Base) MCG/ACT inhaler Inhale 2 puffs into the lungs every 6 hours as needed for Wheezing or Shortness of Breath 18 g 3    predniSONE (DELTASONE) 1 MG tablet Take 1 tablet by mouth daily - resume after completes decadron 60 tablet 2    ALPRAZolam (XANAX) 0.25 MG tablet Take 1 tablet by mouth 2 times daily as needed for Anxiety for up to 90 days.  30 tablet 2    sennosides-docusate sodium (SENOKOT-S) 8.6-50 MG tablet Take 1-2 tablets by mouth daily as needed for Constipation 60 tablet 1    simvastatin (ZOCOR) 20 MG tablet take 1 tablet by mouth every evening 90 tablet 0    levETIRAcetam (KEPPRA) 500 MG tablet take 1 tablet by mouth twice a day 240 tablet 2    loratadine (CLARITIN) 10 MG tablet take 1 tablet by mouth once daily 90 tablet 1    levothyroxine (SYNTHROID) 100 MCG tablet 1 tablet daily 90 tablet 1    REFRESH PLUS 0.5 % SOLN ophthalmic solution use as directed      ondansetron (ZOFRAN-ODT) 4 MG disintegrating tablet Take 4 mg by mouth every 8 hours as needed      traZODone (DESYREL) 50 MG tablet take 1 tablet by mouth at bedtime if needed for sleep 30 tablet 5    lidocaine 4 % external patch Apply 2 patches topically daily 3 box 1    docusate sodium (COLACE, DULCOLAX) 100 MG CAPS Take 100 mg by mouth 2 times daily 60 capsule 1    melatonin 3 MG TABS tablet Take 1 tablet by mouth nightly as needed (insomnia) 30 tablet 3    RA BIOTIN 1000 MCG TABS Take 1,000 mcg by mouth daily       Nutritional Supplements (ENSURE ORIGINAL) LIQD Take 1 Can by mouth 2 times daily 60 Bottle 5    pantoprazole (PROTONIX) 40 MG tablet take 1 tablet by mouth once daily 90 tablet 1    sertraline (ZOLOFT) 50 MG tablet take 1 tablet by mouth twice a day 180 tablet 1    Calcium Carbonate-Vitamin D (OYSTER SHELL CALCIUM/D) 500-200 MG-UNIT TABS take 1 tablet by mouth twice a day 180 tablet 3    RA ASPIRIN EC ADULT LOW ST 81 MG EC tablet take 1 tablet by mouth once daily  vitamin D (ERGOCALCIFEROL) 1.25 MG (44568 UT) CAPS capsule take 1 capsule by mouth every week      promethazine (PHENERGAN) 25 MG tablet Take 1 tablet by mouth every 6 hours as needed for Nausea (and vomiting) 12 tablet 0    Multiple Vitamins-Minerals (CENTRUM SILVER ADULT 50+) TABS Take 1 tablet by mouth daily 90 tablet 1     No current facility-administered medications for this visit. There are no diagnoses linked to this encounter. Medical Decision Making: moderate complexity    Using  accapella           walks  With  Walker     Use  Tessalon  capsuls   For   Cough  And  Using  Home   Oxygen       Also problems with coordination of the albuterol and      needs the use  Of a nebulizer    Latanya Rod was evaluated today and a DME order was entered for a nebulizer compressor in order to administer Albuterol due to the diagnosis of  Copd  And pneumonia. The need for this equipment and treatment was discussed with the patient and she understands and is in agreement.          See in one  Month   Dr Pope Nurse

## 2022-02-10 RX ORDER — PANTOPRAZOLE SODIUM 40 MG/1
TABLET, DELAYED RELEASE ORAL
Qty: 90 TABLET | Refills: 1 | Status: SHIPPED | OUTPATIENT
Start: 2022-02-10

## 2022-02-10 NOTE — TELEPHONE ENCOUNTER
Date of last visit:  8/3/2021  Date of next visit:  Visit date not found    Requested Prescriptions     Pending Prescriptions Disp Refills    pantoprazole (PROTONIX) 40 MG tablet [Pharmacy Med Name: PANTOPRAZOLE SOD DR 40 MG TAB] 90 tablet 1     Sig: take 1 tablet by mouth once daily

## 2022-02-14 ENCOUNTER — TELEPHONE (OUTPATIENT)
Dept: FAMILY MEDICINE CLINIC | Age: 87
End: 2022-02-14

## 2022-02-14 DIAGNOSIS — U07.1 COVID-19: Primary | ICD-10-CM

## 2022-02-14 DIAGNOSIS — U07.1 COVID-19 VIRUS INFECTION: ICD-10-CM

## 2022-02-14 NOTE — TELEPHONE ENCOUNTER
Tal Mullins from [facility] called stating pt was using an nebulizer at the hospital but wasn't sent with one home they are asking for an order be sent to [facility] medical supplies.     If any question's call Tal Mullins at [facility] [phone]

## 2022-02-15 NOTE — TELEPHONE ENCOUNTER
Cipriano Matute from [address] [place] [address] called stating the since Dr. Ruthie Monreal did not see patient for the visit, Dr. Gaby Mejia would have to be the one to order the Nebulizer since he seen her on 02/02/2022 for virtual covid visit. They can not accept 's order.

## 2022-02-16 ENCOUNTER — TELEPHONE (OUTPATIENT)
Dept: FAMILY MEDICINE CLINIC | Age: 87
End: 2022-02-16

## 2022-02-16 RX ORDER — POLYETHYLENE GLYCOL 3350 17 G/17G
17 POWDER, FOR SOLUTION ORAL DAILY PRN
Qty: 510 G | Refills: 5 | Status: SHIPPED | OUTPATIENT
Start: 2022-02-16 | End: 2022-08-15

## 2022-02-16 NOTE — TELEPHONE ENCOUNTER
Pt called stating that she got a RX for gavilax from Dr. Madeleine Armendariz but would like it changed back to the Miralax. She stated that she is not sure who Dr. Marques Fraire is for sure. She is asking if you would be able to call in the Miralax. She said that if she does not take this daily she gets backed up.     Miralax 17g QD    Send to [employer] on [address]

## 2022-02-21 ENCOUNTER — TELEPHONE (OUTPATIENT)
Dept: FAMILY MEDICINE CLINIC | Age: 87
End: 2022-02-21

## 2022-02-21 DIAGNOSIS — U07.1 COVID-19: Primary | ICD-10-CM

## 2022-02-21 DIAGNOSIS — U07.1 COVID-19 VIRUS INFECTION: ICD-10-CM

## 2022-02-21 NOTE — TELEPHONE ENCOUNTER
DME Neb order needed to go under Dr Sydnie Hermosillo and not Dr Linda Bauer. Ordered and they will pull the order out of Epic.

## 2022-03-10 ENCOUNTER — NURSE ONLY (OUTPATIENT)
Dept: LAB | Age: 87
End: 2022-03-10

## 2022-03-10 LAB
CALCIUM SERPL-MCNC: 9.2 MG/DL (ref 8.5–10.5)
CREAT SERPL-MCNC: 0.8 MG/DL (ref 0.4–1.2)
GFR SERPL CREATININE-BSD FRML MDRD: 68 ML/MIN/1.73M2
SEDIMENTATION RATE, ERYTHROCYTE: 2 MM/HR (ref 0–20)
VITAMIN D 25-HYDROXY: 49 NG/ML (ref 30–100)

## 2022-03-18 RX ORDER — ETHYL ALCOHOL 62 %
GEL (ML) TOPICAL
Qty: 60 TABLET | Refills: 1 | Status: ON HOLD | OUTPATIENT
Start: 2022-03-18 | End: 2022-05-19 | Stop reason: HOSPADM

## 2022-03-18 NOTE — TELEPHONE ENCOUNTER
Date of last visit:  8/3/2021  Date of next visit:  Visit date not found    Requested Prescriptions     Pending Prescriptions Disp Refills    RA P COL-RITE 8.6-50 MG per tablet [Pharmacy Med Name: RA P-COL RITE 8.6MG/50MG TAB] 60 tablet 1     Sig: take 1 to 2 tablets by mouth daily if needed for constipation

## 2022-03-22 DIAGNOSIS — C79.31 BREAST CANCER METASTASIZED TO BRAIN, RIGHT (HCC): ICD-10-CM

## 2022-03-22 DIAGNOSIS — C50.911 BREAST CANCER METASTASIZED TO BRAIN, RIGHT (HCC): ICD-10-CM

## 2022-03-22 DIAGNOSIS — C78.7 CARCINOMA OF BREAST METASTATIC TO LIVER, UNSPECIFIED LATERALITY (HCC): ICD-10-CM

## 2022-03-22 DIAGNOSIS — C50.919 CARCINOMA OF BREAST METASTATIC TO LIVER, UNSPECIFIED LATERALITY (HCC): ICD-10-CM

## 2022-03-22 DIAGNOSIS — F41.9 ANXIETY: ICD-10-CM

## 2022-03-22 RX ORDER — PSEUDOEPHEDRINE HCL 30 MG
100 TABLET ORAL 2 TIMES DAILY
Qty: 60 CAPSULE | Refills: 1 | Status: ON HOLD | OUTPATIENT
Start: 2022-03-22 | End: 2022-08-24 | Stop reason: HOSPADM

## 2022-03-22 RX ORDER — ALPRAZOLAM 0.25 MG/1
0.25 TABLET ORAL 2 TIMES DAILY PRN
Qty: 60 TABLET | Refills: 2 | Status: CANCELLED | OUTPATIENT
Start: 2022-03-22 | End: 2022-05-21

## 2022-03-22 RX ORDER — ALPRAZOLAM 0.25 MG/1
0.25 TABLET ORAL 2 TIMES DAILY PRN
Qty: 60 TABLET | Refills: 0 | Status: SHIPPED | OUTPATIENT
Start: 2022-03-22 | End: 2022-05-03

## 2022-03-22 NOTE — TELEPHONE ENCOUNTER
Date of last visit:  8/3/2021  Date of next visit:  Visit date not found    Requested Prescriptions     Pending Prescriptions Disp Refills    ALPRAZolam (XANAX) 0.25 MG tablet 60 tablet      Sig: Take 1 tablet by mouth 2 times daily as needed for Anxiety for up to 60 days.     docusate (COLACE, DULCOLAX) 100 MG CAPS 60 capsule 1     Sig: Take 100 mg by mouth 2 times daily    RA BIOTIN 1000 MCG TABS 60 tablet      Sig: Take 1,000 mcg by mouth daily Patient taking it twice a day

## 2022-03-28 RX ORDER — SIMVASTATIN 20 MG
TABLET ORAL
Qty: 90 TABLET | Refills: 1 | Status: SHIPPED | OUTPATIENT
Start: 2022-03-28 | End: 2022-08-24

## 2022-03-28 NOTE — TELEPHONE ENCOUNTER
Date of last visit:  9/3/2021  Date of next visit:  Visit date not found    Requested Prescriptions     Pending Prescriptions Disp Refills    simvastatin (ZOCOR) 20 MG tablet [Pharmacy Med Name: SIMVASTATIN 20 MG TABLET] 90 tablet 0     Sig: take 1 tablet by mouth every evening

## 2022-04-01 ENCOUNTER — TELEPHONE (OUTPATIENT)
Dept: FAMILY MEDICINE CLINIC | Age: 87
End: 2022-04-01

## 2022-04-01 DIAGNOSIS — J45.909 ASTHMATIC BRONCHITIS WITHOUT COMPLICATION, UNSPECIFIED ASTHMA SEVERITY, UNSPECIFIED WHETHER PERSISTENT: Primary | ICD-10-CM

## 2022-04-01 RX ORDER — ISOSORBIDE MONONITRATE 30 MG/1
TABLET, EXTENDED RELEASE ORAL
Qty: 90 TABLET | Refills: 1 | Status: SHIPPED | OUTPATIENT
Start: 2022-04-01 | End: 2022-10-04 | Stop reason: SDUPTHER

## 2022-04-01 RX ORDER — AZITHROMYCIN 250 MG/1
TABLET, FILM COATED ORAL
Qty: 1 PACKET | Refills: 0 | Status: SHIPPED | OUTPATIENT
Start: 2022-04-01 | End: 2022-04-11

## 2022-04-01 RX ORDER — PREDNISONE 10 MG/1
TABLET ORAL
Qty: 16 TABLET | Refills: 0 | Status: ON HOLD | OUTPATIENT
Start: 2022-04-01 | End: 2022-05-19 | Stop reason: HOSPADM

## 2022-04-01 NOTE — TELEPHONE ENCOUNTER
Date of last visit:  8/3/2021  Date of next visit:  Visit date not found    Requested Prescriptions     Pending Prescriptions Disp Refills    isosorbide mononitrate (IMDUR) 30 MG extended release tablet [Pharmacy Med Name: ISOSORBIDE MONONIT ER 30 MG TB] 90 tablet 1     Sig: take 1 tablet by mouth once daily

## 2022-04-01 NOTE — TELEPHONE ENCOUNTER
Please call home health, patient started on Z-Moreno and prednisone, continue nebulizer, follow-up next week if no better or go to the emergency room if worst

## 2022-04-01 NOTE — TELEPHONE ENCOUNTER
Sadie Morales called from [facility] and said that she was visiting pt today. She has been using her Nebulizer Machine with the Albuterol twice a day. Sadie Morales has noticed wheezing throughtout her lungs. She said that her Pulse Ox was 96. She did use some O2 yesterday. She has no other Sx. Clear sputum. They would like to know if anything else should be done. Please send any Rx to [facility].

## 2022-05-02 DIAGNOSIS — C50.919 CARCINOMA OF BREAST METASTATIC TO LIVER, UNSPECIFIED LATERALITY (HCC): ICD-10-CM

## 2022-05-02 DIAGNOSIS — F41.9 ANXIETY: ICD-10-CM

## 2022-05-02 DIAGNOSIS — C78.7 CARCINOMA OF BREAST METASTATIC TO LIVER, UNSPECIFIED LATERALITY (HCC): ICD-10-CM

## 2022-05-02 DIAGNOSIS — C79.31 BREAST CANCER METASTASIZED TO BRAIN, RIGHT (HCC): ICD-10-CM

## 2022-05-02 DIAGNOSIS — C50.911 BREAST CANCER METASTASIZED TO BRAIN, RIGHT (HCC): ICD-10-CM

## 2022-05-02 NOTE — TELEPHONE ENCOUNTER
Date of last visit:  8/3/2021  Date of next visit:  Visit date not found    Requested Prescriptions     Pending Prescriptions Disp Refills    ALPRAZolam (XANAX) 0.25 MG tablet [Pharmacy Med Name: ALPRAZOLAM 0.25 MG TABLET] 60 tablet      Sig: take 1 tablet by mouth twice a day if needed for anxiety

## 2022-05-03 RX ORDER — ALPRAZOLAM 0.25 MG/1
TABLET ORAL
Qty: 60 TABLET | Refills: 2 | Status: SHIPPED | OUTPATIENT
Start: 2022-05-03 | End: 2022-08-01

## 2022-05-04 RX ORDER — LEVOTHYROXINE SODIUM 0.1 MG/1
TABLET ORAL
Qty: 90 TABLET | Refills: 0 | Status: SHIPPED | OUTPATIENT
Start: 2022-05-04 | End: 2022-10-04 | Stop reason: SDUPTHER

## 2022-05-04 NOTE — TELEPHONE ENCOUNTER
Date of last visit:  8/3/2021  Date of next visit:  Visit date not found    Requested Prescriptions     Pending Prescriptions Disp Refills    levothyroxine (SYNTHROID) 100 MCG tablet [Pharmacy Med Name: LEVOTHYROXINE 100 MCG TABLET] 90 tablet 1     Sig: take 1 tablet by mouth once daily

## 2022-05-06 ENCOUNTER — APPOINTMENT (OUTPATIENT)
Dept: MRI IMAGING | Age: 87
DRG: 025 | End: 2022-05-06
Payer: MEDICARE

## 2022-05-06 ENCOUNTER — TELEPHONE (OUTPATIENT)
Dept: FAMILY MEDICINE CLINIC | Age: 87
End: 2022-05-06

## 2022-05-06 ENCOUNTER — HOSPITAL ENCOUNTER (INPATIENT)
Age: 87
LOS: 13 days | Discharge: HOME HEALTH | DRG: 025 | End: 2022-05-19
Attending: EMERGENCY MEDICINE | Admitting: INTERNAL MEDICINE
Payer: MEDICARE

## 2022-05-06 ENCOUNTER — APPOINTMENT (OUTPATIENT)
Dept: CT IMAGING | Age: 87
DRG: 025 | End: 2022-05-06
Payer: MEDICARE

## 2022-05-06 ENCOUNTER — OFFICE VISIT (OUTPATIENT)
Dept: NEUROSURGERY | Age: 87
End: 2022-05-06
Payer: MEDICARE

## 2022-05-06 VITALS
HEIGHT: 60 IN | SYSTOLIC BLOOD PRESSURE: 114 MMHG | WEIGHT: 134 LBS | DIASTOLIC BLOOD PRESSURE: 70 MMHG | BODY MASS INDEX: 26.31 KG/M2

## 2022-05-06 DIAGNOSIS — Z98.890 STATUS POST CRANIOTOMY: ICD-10-CM

## 2022-05-06 DIAGNOSIS — Z86.79: Primary | ICD-10-CM

## 2022-05-06 DIAGNOSIS — I10 ESSENTIAL HYPERTENSION: ICD-10-CM

## 2022-05-06 DIAGNOSIS — C79.31 BRAIN METASTASES (HCC): Primary | ICD-10-CM

## 2022-05-06 DIAGNOSIS — D69.6 THROMBOCYTOPENIA (HCC): ICD-10-CM

## 2022-05-06 DIAGNOSIS — R11.2 NAUSEA AND VOMITING, INTRACTABILITY OF VOMITING NOT SPECIFIED, UNSPECIFIED VOMITING TYPE: ICD-10-CM

## 2022-05-06 DIAGNOSIS — N39.0 URINARY TRACT INFECTION WITHOUT HEMATURIA, SITE UNSPECIFIED: Primary | ICD-10-CM

## 2022-05-06 DIAGNOSIS — G93.6 BRAIN EDEMA (HCC): ICD-10-CM

## 2022-05-06 DIAGNOSIS — R51.9 NONINTRACTABLE HEADACHE, UNSPECIFIED CHRONICITY PATTERN, UNSPECIFIED HEADACHE TYPE: ICD-10-CM

## 2022-05-06 DIAGNOSIS — R42 DIZZINESS: ICD-10-CM

## 2022-05-06 LAB
ALBUMIN SERPL-MCNC: 4.2 G/DL (ref 3.5–5.1)
ALP BLD-CCNC: 43 U/L (ref 38–126)
ALT SERPL-CCNC: 14 U/L (ref 11–66)
ANION GAP SERPL CALCULATED.3IONS-SCNC: 14 MEQ/L (ref 8–16)
AST SERPL-CCNC: 24 U/L (ref 5–40)
BASOPHILS # BLD: 0.1 %
BASOPHILS ABSOLUTE: 0 THOU/MM3 (ref 0–0.1)
BILIRUB SERPL-MCNC: 0.5 MG/DL (ref 0.3–1.2)
BILIRUBIN DIRECT: < 0.2 MG/DL (ref 0–0.3)
BUN BLDV-MCNC: 20 MG/DL (ref 7–22)
CALCIUM SERPL-MCNC: 9.4 MG/DL (ref 8.5–10.5)
CHLORIDE BLD-SCNC: 102 MEQ/L (ref 98–111)
CO2: 24 MEQ/L (ref 23–33)
CREAT SERPL-MCNC: 0.7 MG/DL (ref 0.4–1.2)
EOSINOPHIL # BLD: 1.5 %
EOSINOPHILS ABSOLUTE: 0.1 THOU/MM3 (ref 0–0.4)
ERYTHROCYTE [DISTWIDTH] IN BLOOD BY AUTOMATED COUNT: 14 % (ref 11.5–14.5)
ERYTHROCYTE [DISTWIDTH] IN BLOOD BY AUTOMATED COUNT: 46.9 FL (ref 35–45)
FLU A ANTIGEN: NEGATIVE
FLU B ANTIGEN: NEGATIVE
GFR SERPL CREATININE-BSD FRML MDRD: 79 ML/MIN/1.73M2
GLUCOSE BLD-MCNC: 129 MG/DL (ref 70–108)
HCT VFR BLD CALC: 41.6 % (ref 37–47)
HEMOGLOBIN: 13 GM/DL (ref 12–16)
IMMATURE GRANS (ABS): 0.01 THOU/MM3 (ref 0–0.07)
IMMATURE GRANULOCYTES: 0.1 %
LIPASE: 20.4 U/L (ref 5.6–51.3)
LYMPHOCYTES # BLD: 35.9 %
LYMPHOCYTES ABSOLUTE: 2.7 THOU/MM3 (ref 1–4.8)
MCH RBC QN AUTO: 29 PG (ref 26–33)
MCHC RBC AUTO-ENTMCNC: 31.3 GM/DL (ref 32.2–35.5)
MCV RBC AUTO: 92.9 FL (ref 81–99)
MONOCYTES # BLD: 7.1 %
MONOCYTES ABSOLUTE: 0.5 THOU/MM3 (ref 0.4–1.3)
NUCLEATED RED BLOOD CELLS: 0 /100 WBC
PLATELET # BLD: 150 THOU/MM3 (ref 130–400)
PMV BLD AUTO: 11.8 FL (ref 9.4–12.4)
POTASSIUM REFLEX MAGNESIUM: 3.8 MEQ/L (ref 3.5–5.2)
RBC # BLD: 4.48 MILL/MM3 (ref 4.2–5.4)
SARS-COV-2, NAAT: NOT  DETECTED
SEG NEUTROPHILS: 55.3 %
SEGMENTED NEUTROPHILS ABSOLUTE COUNT: 4.1 THOU/MM3 (ref 1.8–7.7)
SODIUM BLD-SCNC: 140 MEQ/L (ref 135–145)
TOTAL PROTEIN: 6.4 G/DL (ref 6.1–8)
TROPONIN T: < 0.01 NG/ML
WBC # BLD: 7.4 THOU/MM3 (ref 4.8–10.8)

## 2022-05-06 PROCEDURE — 1090F PRES/ABSN URINE INCON ASSESS: CPT

## 2022-05-06 PROCEDURE — 99285 EMERGENCY DEPT VISIT HI MDM: CPT

## 2022-05-06 PROCEDURE — 87804 INFLUENZA ASSAY W/OPTIC: CPT

## 2022-05-06 PROCEDURE — 6360000004 HC RX CONTRAST MEDICATION: Performed by: STUDENT IN AN ORGANIZED HEALTH CARE EDUCATION/TRAINING PROGRAM

## 2022-05-06 PROCEDURE — 87635 SARS-COV-2 COVID-19 AMP PRB: CPT

## 2022-05-06 PROCEDURE — 96374 THER/PROPH/DIAG INJ IV PUSH: CPT

## 2022-05-06 PROCEDURE — 6360000002 HC RX W HCPCS: Performed by: STUDENT IN AN ORGANIZED HEALTH CARE EDUCATION/TRAINING PROGRAM

## 2022-05-06 PROCEDURE — 1123F ACP DISCUSS/DSCN MKR DOCD: CPT

## 2022-05-06 PROCEDURE — 1036F TOBACCO NON-USER: CPT

## 2022-05-06 PROCEDURE — 83690 ASSAY OF LIPASE: CPT

## 2022-05-06 PROCEDURE — 85651 RBC SED RATE NONAUTOMATED: CPT

## 2022-05-06 PROCEDURE — 93005 ELECTROCARDIOGRAM TRACING: CPT | Performed by: STUDENT IN AN ORGANIZED HEALTH CARE EDUCATION/TRAINING PROGRAM

## 2022-05-06 PROCEDURE — 6370000000 HC RX 637 (ALT 250 FOR IP): Performed by: STUDENT IN AN ORGANIZED HEALTH CARE EDUCATION/TRAINING PROGRAM

## 2022-05-06 PROCEDURE — G8427 DOCREV CUR MEDS BY ELIG CLIN: HCPCS

## 2022-05-06 PROCEDURE — 80076 HEPATIC FUNCTION PANEL: CPT

## 2022-05-06 PROCEDURE — 99213 OFFICE O/P EST LOW 20 MIN: CPT

## 2022-05-06 PROCEDURE — A9579 GAD-BASE MR CONTRAST NOS,1ML: HCPCS | Performed by: STUDENT IN AN ORGANIZED HEALTH CARE EDUCATION/TRAINING PROGRAM

## 2022-05-06 PROCEDURE — 4040F PNEUMOC VAC/ADMIN/RCVD: CPT

## 2022-05-06 PROCEDURE — 87500 VANOMYCIN DNA AMP PROBE: CPT

## 2022-05-06 PROCEDURE — 87641 MR-STAPH DNA AMP PROBE: CPT

## 2022-05-06 PROCEDURE — G8417 CALC BMI ABV UP PARAM F/U: HCPCS

## 2022-05-06 PROCEDURE — 70553 MRI BRAIN STEM W/O & W/DYE: CPT

## 2022-05-06 PROCEDURE — 2580000003 HC RX 258: Performed by: STUDENT IN AN ORGANIZED HEALTH CARE EDUCATION/TRAINING PROGRAM

## 2022-05-06 PROCEDURE — 85025 COMPLETE CBC W/AUTO DIFF WBC: CPT

## 2022-05-06 PROCEDURE — 2000000000 HC ICU R&B

## 2022-05-06 PROCEDURE — 84484 ASSAY OF TROPONIN QUANT: CPT

## 2022-05-06 PROCEDURE — 80048 BASIC METABOLIC PNL TOTAL CA: CPT

## 2022-05-06 PROCEDURE — 70450 CT HEAD/BRAIN W/O DYE: CPT

## 2022-05-06 RX ORDER — DEXAMETHASONE 4 MG/1
4 TABLET ORAL EVERY 6 HOURS SCHEDULED
Status: DISCONTINUED | OUTPATIENT
Start: 2022-05-07 | End: 2022-05-06

## 2022-05-06 RX ORDER — ONDANSETRON 2 MG/ML
4 INJECTION INTRAMUSCULAR; INTRAVENOUS EVERY 6 HOURS PRN
Status: DISCONTINUED | OUTPATIENT
Start: 2022-05-06 | End: 2022-05-19 | Stop reason: HOSPADM

## 2022-05-06 RX ORDER — ACETAMINOPHEN 500 MG
1000 TABLET ORAL ONCE
Status: COMPLETED | OUTPATIENT
Start: 2022-05-06 | End: 2022-05-06

## 2022-05-06 RX ORDER — SODIUM CHLORIDE, SODIUM LACTATE, POTASSIUM CHLORIDE, AND CALCIUM CHLORIDE .6; .31; .03; .02 G/100ML; G/100ML; G/100ML; G/100ML
500 INJECTION, SOLUTION INTRAVENOUS ONCE
Status: COMPLETED | OUTPATIENT
Start: 2022-05-06 | End: 2022-05-06

## 2022-05-06 RX ORDER — CIPROFLOXACIN 500 MG/1
500 TABLET, FILM COATED ORAL 2 TIMES DAILY
Qty: 14 TABLET | Refills: 0 | Status: ON HOLD | OUTPATIENT
Start: 2022-05-06 | End: 2022-05-19 | Stop reason: HOSPADM

## 2022-05-06 RX ORDER — ONDANSETRON 4 MG/1
4 TABLET, ORALLY DISINTEGRATING ORAL EVERY 8 HOURS PRN
Status: DISCONTINUED | OUTPATIENT
Start: 2022-05-06 | End: 2022-05-19 | Stop reason: HOSPADM

## 2022-05-06 RX ORDER — ENOXAPARIN SODIUM 100 MG/ML
40 INJECTION SUBCUTANEOUS DAILY
Status: DISCONTINUED | OUTPATIENT
Start: 2022-05-07 | End: 2022-05-19 | Stop reason: HOSPADM

## 2022-05-06 RX ORDER — ONDANSETRON 2 MG/ML
4 INJECTION INTRAMUSCULAR; INTRAVENOUS ONCE
Status: COMPLETED | OUTPATIENT
Start: 2022-05-06 | End: 2022-05-06

## 2022-05-06 RX ORDER — 3% SODIUM CHLORIDE 3 G/100ML
25 INJECTION, SOLUTION INTRAVENOUS CONTINUOUS
Status: DISCONTINUED | OUTPATIENT
Start: 2022-05-07 | End: 2022-05-07

## 2022-05-06 RX ORDER — DEXAMETHASONE 4 MG/1
4 TABLET ORAL EVERY 6 HOURS SCHEDULED
Status: DISCONTINUED | OUTPATIENT
Start: 2022-05-06 | End: 2022-05-12

## 2022-05-06 RX ADMIN — ACETAMINOPHEN 1000 MG: 500 TABLET ORAL at 21:33

## 2022-05-06 RX ADMIN — SODIUM CHLORIDE, POTASSIUM CHLORIDE, SODIUM LACTATE AND CALCIUM CHLORIDE 500 ML: 600; 310; 30; 20 INJECTION, SOLUTION INTRAVENOUS at 20:07

## 2022-05-06 RX ADMIN — DEXAMETHASONE 4 MG: 4 TABLET ORAL at 21:33

## 2022-05-06 RX ADMIN — GADOTERIDOL 10 ML: 279.3 INJECTION, SOLUTION INTRAVENOUS at 23:20

## 2022-05-06 RX ADMIN — ONDANSETRON 4 MG: 2 INJECTION INTRAMUSCULAR; INTRAVENOUS at 20:08

## 2022-05-06 ASSESSMENT — ENCOUNTER SYMPTOMS
BLOOD IN STOOL: 0
COLOR CHANGE: 0
CONSTIPATION: 0
RECTAL PAIN: 0
SHORTNESS OF BREATH: 0
ABDOMINAL PAIN: 0
NAUSEA: 1
BACK PAIN: 0
SHORTNESS OF BREATH: 0
VOMITING: 1
WHEEZING: 0
CONSTIPATION: 0
BACK PAIN: 0
RHINORRHEA: 0
COLOR CHANGE: 0
DIARRHEA: 1
ABDOMINAL DISTENTION: 0
APNEA: 0
NAUSEA: 0
CHOKING: 0
ANAL BLEEDING: 0
TROUBLE SWALLOWING: 0
COUGH: 0
SORE THROAT: 0
COUGH: 0

## 2022-05-06 ASSESSMENT — PAIN - FUNCTIONAL ASSESSMENT
PAIN_FUNCTIONAL_ASSESSMENT: NONE - DENIES PAIN

## 2022-05-06 ASSESSMENT — PAIN DESCRIPTION - LOCATION: LOCATION: ABDOMEN

## 2022-05-06 ASSESSMENT — PAIN SCALES - GENERAL: PAINLEVEL_OUTOF10: 3

## 2022-05-06 ASSESSMENT — PAIN DESCRIPTION - ORIENTATION: ORIENTATION: LOWER

## 2022-05-06 ASSESSMENT — PAIN DESCRIPTION - DESCRIPTORS: DESCRIPTORS: ACHING

## 2022-05-06 NOTE — ED PROVIDER NOTES
[place] ENCOUNTER          Pt Name: Marisol Matthews  MRN: [id number]  [place] 5/29/1933  Date of Evaluation: 5/6/2022  Treating Resident Physician: Maxine Garcia MD  Supervising Physician: Trinda Moritz, MD    [address] [place] [address]       Chief Complaint   Patient presents with    Abdominal Pain     History obtained from chart review and the patient. HISTORY OF PRESENT ILLNESS    HPI    Marisol Matthews is a 80 y.o. female with a past medical history significant for static breast cancer, prior brain metastases, prior craniotomy in July 2021, followed by chemotherapy, presents to the emergency department for evaluation of 2 weeks of headache, 2 days of nausea, vomiting, worse this afternoon. She was seen in neurosurgery outpatient clinic earlier today though symptoms got worse this afternoon with increasing vomiting. The patient has no other acute complaints at this time. REVIEW OF SYSTEMS   Review of Systems   Constitutional: Positive for fatigue. Negative for activity change, appetite change, chills, diaphoresis, fever and unexpected weight change. HENT: Negative for congestion, rhinorrhea, sneezing and sore throat. Respiratory: Negative for apnea, cough, choking, shortness of breath and wheezing. Cardiovascular: Negative for chest pain, palpitations and leg swelling. Gastrointestinal: Positive for diarrhea, nausea and vomiting. Negative for abdominal distention, abdominal pain, anal bleeding, blood in stool, constipation and rectal pain. Genitourinary: Negative for dysuria and urgency. Musculoskeletal: Negative for back pain and neck pain. Skin: Negative for color change, pallor, rash and wound. Neurological: Positive for dizziness, numbness and headaches. Negative for seizures, syncope, weakness and light-headedness. Psychiatric/Behavioral: Negative for agitation and confusion.    All other systems reviewed and are negative.         PAST MEDICAL AND SURGICAL HISTORY     Past Medical History:   Diagnosis Date    Anxiety     Blind right eye     Breast cancer ([facility] 05/23/2016    [facility] @ [facility] only chemo, no surgery    Breast cancer metastasized to liver (5/16) and brain (7/21) ([address] 05/10/2016    Liver lesion noted 5/16 : Brain 7/21    Carotid stenosis      Left ICA 25%    Colostomy in place ([address] 05/27/2016    Degenerative arthritis of cervical spine 05/2007    GERD (gastroesophageal reflux disease) 11/2006    History of craniotomy 07/2021    mets from breast CA    Hx antineoplastic chemo 2016    left breast cancer, no surgery    Hypercholesteremia     Hypoestrogenism     Hypothyroidism     Lumbago     Lumbosacral spinal stenosis 05/2019    MDRO (multiple drug resistant organisms) resistance 2008    pt stated cleared    Metastasis ([address] 2019    from breast to brain    Personal history of cardiac dysrhythmia     Respiratory tract infection due to COVID-19 virus 01/21/2022    Sigmoid diverticulosis 08/2004    Spondylolisthesis of lumbosacral region 08/2004    Steroid-induced hyperglycemia     Subclavian artery stenosis (HCC)     left    Superior and Inferior Pubic ramus fracture, right, closed, initial encounter ([facility] [address] 05/21/2019    SVT (supraventricular tachycardia) ([address] 06/2007    Temporal arteritis ([facility] [address]     Vertebral artery occlusion     left    Vitamin D deficiency 06/2017     Past Surgical History:   Procedure Laterality Date    CARDIAC CATHETERIZATION  5/07    CATARACT REMOVAL  right 1/08; left 2/08    CHOLECYSTECTOMY  1/03    COLONOSCOPY  11/06    COLOSTOMY  09/04/2018    REVERSAL OF COLOSTOMY    CRANIOTOMY Right 7/2/2021    CRANIOTOMY FOR RESECTION/DEBULKING OF RIGHT SIDE INTRA BRAIN TUMOR performed by Matt Alvarenga MD at [address]      ENDOSCOPY, COLON, DIAGNOSTIC      EYE REMOVAL Right 03/2017    EYE SURGERY      EYE SURGERY Right 11/06/2017    Dr Srinivasa Ramey in [address]      partial    OTHER SURGICAL HISTORY  05/27/2016    robotic assisted laparoscopic anterior colon resection and end colostomy    VT OFFICE/OUTPT VISIT,PROCEDURE ONLY N/A 9/4/2018    COLOSTOMY REVERSAL AND PARASTOMAL HERNIA REPAIR performed by Mckenna Chen MD at [address] PULMON[facility]  2002    UPPER GASTROINTESTINAL ENDOSCOPY  11/06     BREAST NEEDLE BIOPSY LEFT Left 05/23/2016    IDC         MEDICATIONS     Current Facility-Administered Medications:     dexamethasone (DECADRON) tablet 4 mg, 4 mg, Oral, 4 times per day, Geovanna Mcdonald MD, 4 mg at 05/06/22 2133    Current Outpatient Medications:     ciprofloxacin (CIPRO) 500 MG tablet, Take 1 tablet by mouth 2 times daily for 7 days, Disp: 14 tablet, Rfl: 0    levothyroxine (SYNTHROID) 100 MCG tablet, take 1 tablet by mouth once daily, Disp: 90 tablet, Rfl: 0    ALPRAZolam (XANAX) 0.25 MG tablet, take 1 tablet by mouth twice a day if needed for anxiety, Disp: 60 tablet, Rfl: 2    isosorbide mononitrate (IMDUR) 30 MG extended release tablet, take 1 tablet by mouth once daily, Disp: 90 tablet, Rfl: 1    predniSONE (DELTASONE) 10 MG tablet, 2 tablets 2 times a day for 2 days then 1  Tablet 2 times a day for 2 days, then 1 tablet daily till gone, Disp: 16 tablet, Rfl: 0    simvastatin (ZOCOR) 20 MG tablet, take 1 tablet by mouth every evening, Disp: 90 tablet, Rfl: 1    docusate (COLACE, DULCOLAX) 100 MG CAPS, Take 100 mg by mouth 2 times daily, Disp: 60 capsule, Rfl: 1    RA BIOTIN 1000 MCG TABS, Take 1,000 mcg by mouth daily Patient taking it twice a day, Disp: 60 tablet, Rfl: 2    RA P COL-RITE 8.6-50 MG per tablet, take 1 to 2 tablets by mouth daily if needed for constipation, Disp: 60 tablet, Rfl: 1    Nebulizers (COMPRESSOR/NEBULIZER) MISC, 1 Device by Does not apply route 4 times daily as needed (Shortness of breath and wheezing), Disp: 1 each, Rfl: 3    albuterol (PROVENTIL) (2.5 MG/3ML) 0.083% nebulizer solution, Take 3 mLs by nebulization every 6 hours as needed for Wheezing, Disp: 120 each, Rfl: 3    polyethylene glycol (GLYCOLAX) 17 GM/SCOOP powder, Take 17 g by mouth daily as needed (constipation), Disp: 510 g, Rfl: 5    pantoprazole (PROTONIX) 40 MG tablet, take 1 tablet by mouth once daily, Disp: 90 tablet, Rfl: 1    zinc sulfate (ZINCATE) 220 (50 Zn) MG capsule, Take 1 capsule by mouth daily, Disp: 30 capsule, Rfl: 0    albuterol sulfate  (90 Base) MCG/ACT inhaler, Inhale 2 puffs into the lungs every 6 hours as needed for Wheezing or Shortness of Breath, Disp: 18 g, Rfl: 3    predniSONE (DELTASONE) 1 MG tablet, Take 1 tablet by mouth daily - resume after completes decadron, Disp: 60 tablet, Rfl: 2    levETIRAcetam (KEPPRA) 500 MG tablet, take 1 tablet by mouth twice a day, Disp: 240 tablet, Rfl: 2    loratadine (CLARITIN) 10 MG tablet, take 1 tablet by mouth once daily, Disp: 90 tablet, Rfl: 1    REFRESH PLUS 0.5 % SOLN ophthalmic solution, use as directed, Disp: , Rfl:     ondansetron (ZOFRAN-ODT) 4 MG disintegrating tablet, Take 4 mg by mouth every 8 hours as needed, Disp: , Rfl:     traZODone (DESYREL) 50 MG tablet, take 1 tablet by mouth at bedtime if needed for sleep, Disp: 30 tablet, Rfl: 5    lidocaine 4 % external patch, Apply 2 patches topically daily, Disp: 3 box, Rfl: 1    melatonin 3 MG TABS tablet, Take 1 tablet by mouth nightly as needed (insomnia), Disp: 30 tablet, Rfl: 3    Nutritional Supplements (ENSURE ORIGINAL) LIQD, Take 1 Can by mouth 2 times daily, Disp: 60 Bottle, Rfl: 5    sertraline (ZOLOFT) 50 MG tablet, take 1 tablet by mouth twice a day, Disp: 180 tablet, Rfl: 1    Calcium Carbonate-Vitamin D (OYSTER SHELL CALCIUM/D) 500-200 MG-UNIT TABS, take 1 tablet by mouth twice a day, Disp: 180 tablet, Rfl: 3    RA ASPIRIN EC ADULT LOW ST 81 MG EC tablet, take 1 tablet by mouth once daily, Disp: , Rfl:     vitamin D (ERGOCALCIFEROL) 1.25 MG (10910 UT) CAPS capsule, take 1 capsule by mouth every week, Disp: , Rfl:     promethazine (PHENERGAN) 25 MG tablet, Take 1 tablet by mouth every 6 hours as needed for Nausea (and vomiting), Disp: 12 tablet, Rfl: 0    Multiple Vitamins-Minerals (CENTRUM SILVER ADULT 50+) TABS, Take 1 tablet by mouth daily, Disp: 90 tablet, Rfl: 1      SOCIAL HISTORY     Social History     Social History Narrative    Not on file     Social History     Tobacco Use    Smoking status: Former Smoker     Packs/day: 0.50     Types: Cigarettes    Smokeless tobacco: Never Used   Vaping Use    Vaping Use: Former    Substances: Always   Substance Use Topics    Alcohol use: No    Drug use: No         ALLERGIES     Allergies   Allergen Reactions    Bactrim [Sulfamethoxazole-Trimethoprim] Swelling     Of tongue    Demerol Rash     nausea    Pcn [Penicillins] Rash         FAMILY HISTORY     Family History   Problem Relation Age of Onset    Breast Cancer Sister 61        breast    Cancer Brother 79        lung    Cancer Brother 68        colon    Cancer Son 48        lung         PREVIOUS RECORDS   Previous records reviewed: notes from Neurology reviewed. PHYSICAL EXAM     ED Triage Vitals   BP Temp Temp src Pulse Resp SpO2 Height Weight   -- -- -- -- -- -- -- --     Initial vital signs and nursing assessment reviewed and normal. Body mass index is 25.39 kg/m². Pulsoximetry is normal per my interpretation. Additional Vital Signs:  Vitals:    05/06/22 2120   BP: 134/75   Pulse: 62   Resp: 20   Temp:    SpO2: 96%       Physical Exam  Vitals and nursing note reviewed. Constitutional:       General: She is not in acute distress. Appearance: She is well-developed and normal weight. She is not ill-appearing, toxic-appearing or diaphoretic. HENT:      Head: Normocephalic. Mouth/Throat:      Mouth: Mucous membranes are moist.      Pharynx: Oropharynx is clear. Cardiovascular:      Rate and Rhythm: Normal rate and regular rhythm. Pulmonary:      Effort: Pulmonary effort is normal.      Breath sounds: Normal breath sounds. Abdominal:      General: Abdomen is flat. Bowel sounds are normal.      Palpations: Abdomen is soft. Tenderness: There is no abdominal tenderness. Skin:     General: Skin is warm and dry. Capillary Refill: Capillary refill takes less than 2 seconds. Neurological:      Mental Status: She is alert and oriented to person, place, and time. GCS: GCS eye subscore is 4. GCS verbal subscore is 5. GCS motor subscore is 6. Psychiatric:         Mood and Affect: Mood normal.             MEDICAL DECISION MAKING   Initial Differential Diagnosis: (includes but is not limited to)  1. Recurrence of brain metastases  2. Viral illness  3. Influenza  4. COVID-19  5. Acute coronary syndrome    Plan:    IV   Monitor   ECG   CBC, BMP, LFT, lipase, troponin   Rapid swabs for COVID-19 influenza   CT head noncontrast   Symptomatic treatment with IV fluid bolus plus ondansetron    Summary:  History concerning for new or worsening brain metastases. CT head shows new edema in right temporal, occipital, parietal lobes. New underlying cystic masses 1 of which measures 2 cm. Patient discussed with on-call neurosurgeon Dr. Raffy Anand, recommends admission to ICU, initiation of dexamethasone 4 mg p.o. every 6 hours, hypertonic saline, MRI brain. He will see the patient in the morning. Patient discussed with Sanam Ojeda NP ICU, who accepts patient for admission to ICU.       ED RESULTS   Laboratory results:  Labs Reviewed   CBC WITH AUTO DIFFERENTIAL - Abnormal; Notable for the following components:       Result Value    MCHC 31.3 (*)     RDW-SD 46.9 (*)     All other components within normal limits   BASIC METABOLIC PANEL W/ REFLEX TO MG FOR LOW K - Abnormal; Notable for the following components:    Glucose 129 (*)     All other components within normal limits   GLOMERULAR FILTRATION RATE, ESTIMATED - Abnormal; Notable for the following components:    Est, Glom Filt Rate 79 (*)     All other components within normal limits   COVID-19, RAPID   RAPID INFLUENZA A/B ANTIGENS   HEPATIC FUNCTION PANEL   LIPASE   TROPONIN   ANION GAP   URINALYSIS WITH REFLEX TO CULTURE       Radiologic studies results:  CT Head WO Contrast   Final Result   Impression:      New edema right temporal, occipital and parietal lobes. There appear to be underlying cystic masses, 1 of which measures 2 cm. These are not well assessed, recommend follow-up contrast-enhanced MRI. This document has been electronically signed by: Portillo Melendrez MD on    05/06/2022 09:06 PM      All CTs at this facility use dose modulation techniques and iterative    reconstructions, and/or weight-based dosing   when appropriate to reduce radiation to a low as reasonably achievable. MRI BRAIN W WO CONTRAST    (Results Pending)       ED Medications administered this visit:   Medications   dexamethasone (DECADRON) tablet 4 mg (4 mg Oral Given 5/6/22 2133)   ondansetron (ZOFRAN) injection 4 mg (4 mg IntraVENous Given 5/6/22 2008)   lactated ringers bolus (500 mLs IntraVENous New Bag 5/6/22 2007)   acetaminophen (TYLENOL) tablet 1,000 mg (1,000 mg Oral Given 5/6/22 2133)         ED COURSE     ED Course as of 05/06/22 2139   Fri May 06, 2022   2114 Call to Dr. Libertad Tobar - Neurosurgery - admit to ICU, start dexamethasone 4 mg every 6 hours, start hypertonic saline. MRI brain. He will see in the morning. [SC]   2132 Discussed results of CT with patient and her niece, talked to other niece on phone per patient request. Patient agrees with admission to ICU.  [SC]      ED Course User Index  [SC] Vania Marin MD       PROCEDURES   Procedures      MEDICATION CHANGES     New Prescriptions    No medications on file         FINAL DISPOSITION     Final diagnoses:   Brain metastases ([address]   Brain edema (HCC)   Nausea and vomiting, intractability of vomiting not specified, unspecified vomiting type       Condition: condition: serious  Dispo: Admit to CCU/ICU      This transcription was electronically signed. Parts of this transcriptions may have been dictated by use of voice recognition software and electronically transcribed, and parts may have been transcribed with the assistance of an ED scribe. The transcription may contain errors not detected in proofreading. Please refer to my supervising physician's documentation if my documentation differs.     Electronically Signed: Bernabe Solano MD, 05/06/22, 9:39 PM         Kaitlyn Aaron MD  Resident  05/06/22 4765

## 2022-05-06 NOTE — TELEPHONE ENCOUNTER
Sebastian Buckner, called stating that pt is having burning with urination and frequency. She is asking for something to be called in for pt.     [facility] on [address]

## 2022-05-06 NOTE — TELEPHONE ENCOUNTER
Pt informed. She said that she is unable to get a urine as she has no way to get anywhere. I called [facility] to see if they could deliver. They are going to deliver to pt.

## 2022-05-06 NOTE — TELEPHONE ENCOUNTER
Please let her know that Rx was done  Also order for urinalysis done  Needs seen if not better or worse.

## 2022-05-06 NOTE — PROGRESS NOTES
Neurosurgery Follow up Note    Date:5/6/2022         Patient Name:Latanya Rod     YOB: 1933     Age:88 y.o. Reason for Follow up: Follow up for headaches and history of brain tumor      Chief Complaint:   Chief Complaint   Patient presents with    Follow-up     increased headaches and pressure with brain tumor         Subjective     Roger Duran is a 80year old female who presents to the office with her niece Daya Valles ambulating with a rolling walker. Patient underwent surgery by Dr. Jorge Davila on 7/2/2021 for craniotomy for resection/debulking of right-sided anterior brain tumor. Patient stated that her headaches have been going on for 2 weeks. She stated that she has about 3-4 headaches a week. She thinks that she thinks they are stress related to her house. She stated that her nephew bought her house 6 months ago and she wants to buy it back. She is worried that he may want her out of the house. She stated that her headaches are frontal and radiates to the back of her head. She stated she is getting dizzy when hs stands up. She stated that she is not having a headache  Or dizziness today. She stated that her niece is making her her increase her water intake and she has noticed that her headaches and dizziness improved. Patient is blind. Patient stated that the stress has been causing her to have difficulty sleeping. She lives alone and has a home health aide that comes in everyday for 2 hours. Review of Systems   Review of Systems   Constitutional: Negative for activity change, appetite change, fatigue and fever. HENT: Negative for trouble swallowing. Eyes: Positive for visual disturbance (blind). Respiratory: Negative for cough and shortness of breath. Cardiovascular: Negative for chest pain. Gastrointestinal: Negative for constipation and nausea. Genitourinary: Negative for difficulty urinating. Musculoskeletal: Negative for back pain and gait problem.    Skin: Negative for color change, rash and wound. Neurological: Positive for dizziness, weakness, numbness (bilateral feet and legs) and headaches. Psychiatric/Behavioral: Positive for sleep disturbance. Negative for confusion. The patient is not nervous/anxious.         Medications     Current Outpatient Medications on File Prior to Visit   Medication Sig Dispense Refill    levothyroxine (SYNTHROID) 100 MCG tablet take 1 tablet by mouth once daily 90 tablet 0    ALPRAZolam (XANAX) 0.25 MG tablet take 1 tablet by mouth twice a day if needed for anxiety 60 tablet 2    isosorbide mononitrate (IMDUR) 30 MG extended release tablet take 1 tablet by mouth once daily 90 tablet 1    predniSONE (DELTASONE) 10 MG tablet 2 tablets 2 times a day for 2 days then 1  Tablet 2 times a day for 2 days, then 1 tablet daily till gone 16 tablet 0    simvastatin (ZOCOR) 20 MG tablet take 1 tablet by mouth every evening 90 tablet 1    docusate (COLACE, DULCOLAX) 100 MG CAPS Take 100 mg by mouth 2 times daily 60 capsule 1    RA BIOTIN 1000 MCG TABS Take 1,000 mcg by mouth daily Patient taking it twice a day 60 tablet 2    RA P COL-RITE 8.6-50 MG per tablet take 1 to 2 tablets by mouth daily if needed for constipation 60 tablet 1    Nebulizers (COMPRESSOR/NEBULIZER) MISC 1 Device by Does not apply route 4 times daily as needed (Shortness of breath and wheezing) 1 each 3    albuterol (PROVENTIL) (2.5 MG/3ML) 0.083% nebulizer solution Take 3 mLs by nebulization every 6 hours as needed for Wheezing 120 each 3    polyethylene glycol (GLYCOLAX) 17 GM/SCOOP powder Take 17 g by mouth daily as needed (constipation) 510 g 5    pantoprazole (PROTONIX) 40 MG tablet take 1 tablet by mouth once daily 90 tablet 1    zinc sulfate (ZINCATE) 220 (50 Zn) MG capsule Take 1 capsule by mouth daily 30 capsule 0    albuterol sulfate  (90 Base) MCG/ACT inhaler Inhale 2 puffs into the lungs every 6 hours as needed for Wheezing or Shortness of Breath 18 g 3  predniSONE (DELTASONE) 1 MG tablet Take 1 tablet by mouth daily - resume after completes decadron 60 tablet 2    levETIRAcetam (KEPPRA) 500 MG tablet take 1 tablet by mouth twice a day 240 tablet 2    loratadine (CLARITIN) 10 MG tablet take 1 tablet by mouth once daily 90 tablet 1    REFRESH PLUS 0.5 % SOLN ophthalmic solution use as directed      ondansetron (ZOFRAN-ODT) 4 MG disintegrating tablet Take 4 mg by mouth every 8 hours as needed      traZODone (DESYREL) 50 MG tablet take 1 tablet by mouth at bedtime if needed for sleep 30 tablet 5    lidocaine 4 % external patch Apply 2 patches topically daily 3 box 1    melatonin 3 MG TABS tablet Take 1 tablet by mouth nightly as needed (insomnia) 30 tablet 3    Nutritional Supplements (ENSURE ORIGINAL) LIQD Take 1 Can by mouth 2 times daily 60 Bottle 5    sertraline (ZOLOFT) 50 MG tablet take 1 tablet by mouth twice a day 180 tablet 1    Calcium Carbonate-Vitamin D (OYSTER SHELL CALCIUM/D) 500-200 MG-UNIT TABS take 1 tablet by mouth twice a day 180 tablet 3    RA ASPIRIN EC ADULT LOW ST 81 MG EC tablet take 1 tablet by mouth once daily      vitamin D (ERGOCALCIFEROL) 1.25 MG (92622 UT) CAPS capsule take 1 capsule by mouth every week      promethazine (PHENERGAN) 25 MG tablet Take 1 tablet by mouth every 6 hours as needed for Nausea (and vomiting) 12 tablet 0    Multiple Vitamins-Minerals (CENTRUM SILVER ADULT 50+) TABS Take 1 tablet by mouth daily 90 tablet 1     No current facility-administered medications on file prior to visit.         Past History    Past Medical History:   has a past medical history of Anxiety, Blind right eye, Breast cancer ([facility] [address] Breast cancer metastasized to liver (5/16) and brain (7/21) ([facility] [address] Carotid stenosis, Colostomy in place [facility]), Degenerative arthritis of cervical spine, GERD (gastroesophageal reflux disease), History of craniotomy, Hx antineoplastic chemo, Hypercholesteremia, Hypoestrogenism, Hypothyroidism, Lumbago, Lumbosacral spinal stenosis, MDRO (multiple drug resistant organisms) resistance, Metastasis (HCC), Personal history of cardiac dysrhythmia, Respiratory tract infection due to COVID-19 virus, Sigmoid diverticulosis, Spondylolisthesis of lumbosacral region, Steroid-induced hyperglycemia, Subclavian artery stenosis (HCC), Superior and Inferior Pubic ramus fracture, right, closed, initial encounter ([facility] SVT (supraventricular tachycardia) ([facility] [address] Temporal arteritis ([facility] [address] Vertebral artery occlusion, and Vitamin D deficiency. Social History:   reports that she has quit smoking. Her smoking use included cigarettes. She smoked 0.50 packs per day. She has never used smokeless tobacco. She reports that she does not drink alcohol and does not use drugs. Family History:   Family History   Problem Relation Age of Onset    Breast Cancer Sister 61        breast    Cancer Brother 79        lung    Cancer Brother 68        colon    Cancer Son 48        lung       Physical Examination      Vitals:  Ht 5' (1.524 m)   Wt 134 lb (60.8 kg)   LMP  (LMP Unknown) Comment: age 52 ovaries intact  BMI 26.17 kg/m²       Physical Exam  Constitutional:       Appearance: Normal appearance. She is not ill-appearing. HENT:      Nose: Nose normal.      Mouth/Throat:      Mouth: Mucous membranes are moist.   Eyes:      Pupils: Pupils are equal, round, and reactive to light. Cardiovascular:      Rate and Rhythm: Normal rate. Pulses: Normal pulses. Pulmonary:      Effort: Pulmonary effort is normal.   Abdominal:      General: Bowel sounds are normal.   Musculoskeletal:         General: No tenderness. Cervical back: Normal range of motion. Skin:     General: Skin is warm and dry. Findings: No erythema. Neurological:      Mental Status: She is alert and oriented to person, place, and time. Sensory: Sensory deficit (numbness in legs and feet) present. Motor: No weakness.       Comments: LU 5/5  HARISH 5/5  RLE5/5  LLE 5/5   Psychiatric:         Mood and Affect: Mood normal.         Behavior: Behavior normal.         Thought Content: Thought content normal.         Judgment: Judgment normal.       Neurologic Exam     Mental Status   Oriented to person, place, and time. Cranial Nerves     CN III, IV, VI   Pupils are equal, round, and reactive to light. Imaging   Imaging last 30 days:  No results found. Assessment and Plan:          1. Follow up for headaches S/P craniotomy and tumor evacuation  2. Patient is doing well  3. Continue home health  4. Continue walking with wheeled walker  5. Fall precautions  6. Discussed with patient to continue drinking water. 7. MRI of the brain with and without contrast ordered  8. Follow up in 1 weeks. 9. Report to the emergency room if you experience sudden onset of headaches, weakness, facial droop , or difficulty with speaking  10. Call office is symptoms worsen or fail to improve  11. All patient questions answered. Pt voiced understanding.  Patient instructed to call the office with any questions or concerns      Electronically signed by PAMELA Pate CNP on 5/6/22 at 10:46 AM EDT

## 2022-05-06 NOTE — ED TRIAGE NOTES
Pt presents to the ER from home via EMS for abdominal pain and N/V. Pt states she had emesis at home x1, was diaphoretic, and lightheaded about an hour ago. Pt states she has a hx of cancer. PT denies CP and SOB. Pt is alert and oriented, respirations even and unlabored. VSS. Pt is on 2L at home.

## 2022-05-07 ENCOUNTER — APPOINTMENT (OUTPATIENT)
Dept: GENERAL RADIOLOGY | Age: 87
DRG: 025 | End: 2022-05-07
Payer: MEDICARE

## 2022-05-07 LAB
ANION GAP SERPL CALCULATED.3IONS-SCNC: 11 MEQ/L (ref 8–16)
BACTERIA: ABNORMAL /HPF
BILIRUBIN URINE: NEGATIVE
BLOOD, URINE: NEGATIVE
BUN BLDV-MCNC: 18 MG/DL (ref 7–22)
CALCIUM SERPL-MCNC: 8.8 MG/DL (ref 8.5–10.5)
CASTS 2: ABNORMAL /LPF
CASTS UA: ABNORMAL /LPF
CHARACTER, URINE: CLEAR
CHLORIDE BLD-SCNC: 104 MEQ/L (ref 98–111)
CO2: 23 MEQ/L (ref 23–33)
COLOR: YELLOW
CREAT SERPL-MCNC: 0.6 MG/DL (ref 0.4–1.2)
CRYSTALS, UA: ABNORMAL
EKG ATRIAL RATE: 63 BPM
EKG P AXIS: 82 DEGREES
EKG P-R INTERVAL: 160 MS
EKG Q-T INTERVAL: 460 MS
EKG QRS DURATION: 88 MS
EKG QTC CALCULATION (BAZETT): 470 MS
EKG R AXIS: 12 DEGREES
EKG T AXIS: 112 DEGREES
EKG VENTRICULAR RATE: 63 BPM
EPITHELIAL CELLS, UA: ABNORMAL /HPF
ERYTHROCYTE [DISTWIDTH] IN BLOOD BY AUTOMATED COUNT: 13.8 % (ref 11.5–14.5)
ERYTHROCYTE [DISTWIDTH] IN BLOOD BY AUTOMATED COUNT: 46.5 FL (ref 35–45)
GFR SERPL CREATININE-BSD FRML MDRD: > 90 ML/MIN/1.73M2
GLUCOSE BLD-MCNC: 131 MG/DL (ref 70–108)
GLUCOSE URINE: NEGATIVE MG/DL
HCT VFR BLD CALC: 38.7 % (ref 37–47)
HEMOGLOBIN: 12.1 GM/DL (ref 12–16)
KETONES, URINE: 15
LEUKOCYTE ESTERASE, URINE: ABNORMAL
MAGNESIUM: 1.7 MG/DL (ref 1.6–2.4)
MCH RBC QN AUTO: 29.1 PG (ref 26–33)
MCHC RBC AUTO-ENTMCNC: 31.3 GM/DL (ref 32.2–35.5)
MCV RBC AUTO: 93 FL (ref 81–99)
MISCELLANEOUS 2: ABNORMAL
MRSA SCREEN RT-PCR: NEGATIVE
NITRITE, URINE: NEGATIVE
PH UA: 6.5 (ref 5–9)
PLATELET # BLD: 142 THOU/MM3 (ref 130–400)
PMV BLD AUTO: 12.2 FL (ref 9.4–12.4)
POTASSIUM SERPL-SCNC: 4.4 MEQ/L (ref 3.5–5.2)
PROTEIN UA: NEGATIVE
RBC # BLD: 4.16 MILL/MM3 (ref 4.2–5.4)
RBC URINE: ABNORMAL /HPF
RENAL EPITHELIAL, UA: ABNORMAL
SEDIMENTATION RATE, ERYTHROCYTE: 2 MM/HR (ref 0–20)
SODIUM BLD-SCNC: 132 MEQ/L (ref 135–145)
SODIUM BLD-SCNC: 138 MEQ/L (ref 135–145)
SODIUM BLD-SCNC: 138 MEQ/L (ref 135–145)
SODIUM BLD-SCNC: 139 MEQ/L (ref 135–145)
SODIUM BLD-SCNC: 140 MEQ/L (ref 135–145)
SPECIFIC GRAVITY, URINE: 1.02 (ref 1–1.03)
T4 FREE: 1.69 NG/DL (ref 0.93–1.76)
TSH SERPL DL<=0.05 MIU/L-ACNC: 0.36 UIU/ML (ref 0.4–4.2)
UROBILINOGEN, URINE: 0.2 EU/DL (ref 0–1)
VANCOMYCIN RESISTANT ENTEROCOCCUS: NEGATIVE
WBC # BLD: 6 THOU/MM3 (ref 4.8–10.8)
WBC UA: ABNORMAL /HPF
YEAST: ABNORMAL

## 2022-05-07 PROCEDURE — 87186 SC STD MICRODIL/AGAR DIL: CPT

## 2022-05-07 PROCEDURE — 2580000003 HC RX 258: Performed by: NURSE PRACTITIONER

## 2022-05-07 PROCEDURE — 93010 ELECTROCARDIOGRAM REPORT: CPT | Performed by: INTERNAL MEDICINE

## 2022-05-07 PROCEDURE — 2700000000 HC OXYGEN THERAPY PER DAY

## 2022-05-07 PROCEDURE — 6360000002 HC RX W HCPCS: Performed by: STUDENT IN AN ORGANIZED HEALTH CARE EDUCATION/TRAINING PROGRAM

## 2022-05-07 PROCEDURE — 87086 URINE CULTURE/COLONY COUNT: CPT

## 2022-05-07 PROCEDURE — 87077 CULTURE AEROBIC IDENTIFY: CPT

## 2022-05-07 PROCEDURE — 02HV33Z INSERTION OF INFUSION DEVICE INTO SUPERIOR VENA CAVA, PERCUTANEOUS APPROACH: ICD-10-PCS | Performed by: FAMILY MEDICINE

## 2022-05-07 PROCEDURE — 84439 ASSAY OF FREE THYROXINE: CPT

## 2022-05-07 PROCEDURE — 51798 US URINE CAPACITY MEASURE: CPT

## 2022-05-07 PROCEDURE — 36556 INSERT NON-TUNNEL CV CATH: CPT | Performed by: NURSE PRACTITIONER

## 2022-05-07 PROCEDURE — 84443 ASSAY THYROID STIM HORMONE: CPT

## 2022-05-07 PROCEDURE — 71045 X-RAY EXAM CHEST 1 VIEW: CPT

## 2022-05-07 PROCEDURE — 99291 CRITICAL CARE FIRST HOUR: CPT | Performed by: NEUROLOGICAL SURGERY

## 2022-05-07 PROCEDURE — 6370000000 HC RX 637 (ALT 250 FOR IP): Performed by: NURSE PRACTITIONER

## 2022-05-07 PROCEDURE — 94761 N-INVAS EAR/PLS OXIMETRY MLT: CPT

## 2022-05-07 PROCEDURE — 2000000000 HC ICU R&B

## 2022-05-07 PROCEDURE — 85027 COMPLETE CBC AUTOMATED: CPT

## 2022-05-07 PROCEDURE — 6360000002 HC RX W HCPCS: Performed by: NURSE PRACTITIONER

## 2022-05-07 PROCEDURE — APPSS30 APP SPLIT SHARED TIME 16-30 MINUTES: Performed by: PHYSICIAN ASSISTANT

## 2022-05-07 PROCEDURE — 81001 URINALYSIS AUTO W/SCOPE: CPT

## 2022-05-07 PROCEDURE — 99291 CRITICAL CARE FIRST HOUR: CPT | Performed by: INTERNAL MEDICINE

## 2022-05-07 PROCEDURE — 2580000003 HC RX 258: Performed by: INTERNAL MEDICINE

## 2022-05-07 PROCEDURE — 36415 COLL VENOUS BLD VENIPUNCTURE: CPT

## 2022-05-07 PROCEDURE — 84295 ASSAY OF SERUM SODIUM: CPT

## 2022-05-07 PROCEDURE — 80048 BASIC METABOLIC PNL TOTAL CA: CPT

## 2022-05-07 PROCEDURE — 83735 ASSAY OF MAGNESIUM: CPT

## 2022-05-07 RX ORDER — ASPIRIN 81 MG/1
81 TABLET ORAL DAILY
Status: DISCONTINUED | OUTPATIENT
Start: 2022-05-07 | End: 2022-05-19 | Stop reason: HOSPADM

## 2022-05-07 RX ORDER — ATORVASTATIN CALCIUM 10 MG/1
10 TABLET, FILM COATED ORAL NIGHTLY
Status: DISCONTINUED | OUTPATIENT
Start: 2022-05-07 | End: 2022-05-19 | Stop reason: HOSPADM

## 2022-05-07 RX ORDER — SODIUM CHLORIDE, SODIUM LACTATE, POTASSIUM CHLORIDE, AND CALCIUM CHLORIDE .6; .31; .03; .02 G/100ML; G/100ML; G/100ML; G/100ML
500 INJECTION, SOLUTION INTRAVENOUS ONCE
Status: COMPLETED | OUTPATIENT
Start: 2022-05-07 | End: 2022-05-07

## 2022-05-07 RX ORDER — HYDROCODONE BITARTRATE AND ACETAMINOPHEN 5; 325 MG/1; MG/1
1 TABLET ORAL
Status: COMPLETED | OUTPATIENT
Start: 2022-05-07 | End: 2022-05-07

## 2022-05-07 RX ORDER — 3% SODIUM CHLORIDE 3 G/100ML
30 INJECTION, SOLUTION INTRAVENOUS CONTINUOUS
Status: DISPENSED | OUTPATIENT
Start: 2022-05-07 | End: 2022-05-08

## 2022-05-07 RX ORDER — LEVOTHYROXINE SODIUM 0.1 MG/1
100 TABLET ORAL DAILY
Status: DISCONTINUED | OUTPATIENT
Start: 2022-05-07 | End: 2022-05-19 | Stop reason: HOSPADM

## 2022-05-07 RX ORDER — DOCUSATE SODIUM 100 MG/1
100 CAPSULE, LIQUID FILLED ORAL 2 TIMES DAILY
Status: DISCONTINUED | OUTPATIENT
Start: 2022-05-07 | End: 2022-05-19 | Stop reason: HOSPADM

## 2022-05-07 RX ORDER — LANOLIN ALCOHOL/MO/W.PET/CERES
3 CREAM (GRAM) TOPICAL NIGHTLY PRN
Status: DISCONTINUED | OUTPATIENT
Start: 2022-05-07 | End: 2022-05-19 | Stop reason: HOSPADM

## 2022-05-07 RX ORDER — PANTOPRAZOLE SODIUM 40 MG/1
40 TABLET, DELAYED RELEASE ORAL
Status: DISCONTINUED | OUTPATIENT
Start: 2022-05-07 | End: 2022-05-19 | Stop reason: HOSPADM

## 2022-05-07 RX ORDER — POLYETHYLENE GLYCOL 3350 17 G/17G
17 POWDER, FOR SOLUTION ORAL DAILY PRN
Status: DISCONTINUED | OUTPATIENT
Start: 2022-05-07 | End: 2022-05-19 | Stop reason: HOSPADM

## 2022-05-07 RX ADMIN — HYDROCODONE BITARTRATE AND ACETAMINOPHEN 1 TABLET: 5; 325 TABLET ORAL at 00:48

## 2022-05-07 RX ADMIN — ASPIRIN 81 MG: 81 TABLET, COATED ORAL at 08:33

## 2022-05-07 RX ADMIN — LEVOTHYROXINE SODIUM 100 MCG: 0.1 TABLET ORAL at 06:49

## 2022-05-07 RX ADMIN — ATORVASTATIN CALCIUM 10 MG: 10 TABLET, FILM COATED ORAL at 20:27

## 2022-05-07 RX ADMIN — SODIUM CHLORIDE 25 ML/HR: 3 INJECTION, SOLUTION INTRAVENOUS at 22:11

## 2022-05-07 RX ADMIN — LEVETIRACETAM 500 MG: 100 INJECTION, SOLUTION INTRAVENOUS at 00:52

## 2022-05-07 RX ADMIN — SERTRALINE 50 MG: 50 TABLET, FILM COATED ORAL at 10:41

## 2022-05-07 RX ADMIN — SODIUM CHLORIDE, POTASSIUM CHLORIDE, SODIUM LACTATE AND CALCIUM CHLORIDE 500 ML: 600; 310; 30; 20 INJECTION, SOLUTION INTRAVENOUS at 17:43

## 2022-05-07 RX ADMIN — PANTOPRAZOLE SODIUM 40 MG: 40 TABLET, DELAYED RELEASE ORAL at 06:49

## 2022-05-07 RX ADMIN — DOCUSATE SODIUM 100 MG: 100 CAPSULE, LIQUID FILLED ORAL at 20:27

## 2022-05-07 RX ADMIN — SODIUM CHLORIDE 25 ML/HR: 3 INJECTION, SOLUTION INTRAVENOUS at 01:10

## 2022-05-07 RX ADMIN — DOCUSATE SODIUM 100 MG: 100 CAPSULE, LIQUID FILLED ORAL at 08:33

## 2022-05-07 RX ADMIN — DEXAMETHASONE 4 MG: 4 TABLET ORAL at 17:41

## 2022-05-07 RX ADMIN — LEVETIRACETAM 500 MG: 100 INJECTION, SOLUTION INTRAVENOUS at 13:24

## 2022-05-07 RX ADMIN — DEXAMETHASONE 4 MG: 4 TABLET ORAL at 06:49

## 2022-05-07 RX ADMIN — DEXAMETHASONE 4 MG: 4 TABLET ORAL at 23:53

## 2022-05-07 RX ADMIN — DEXAMETHASONE 4 MG: 4 TABLET ORAL at 13:22

## 2022-05-07 RX ADMIN — SERTRALINE 50 MG: 50 TABLET, FILM COATED ORAL at 20:27

## 2022-05-07 ASSESSMENT — PAIN SCALES - GENERAL
PAINLEVEL_OUTOF10: 0
PAINLEVEL_OUTOF10: 10
PAINLEVEL_OUTOF10: 10
PAINLEVEL_OUTOF10: 0
PAINLEVEL_OUTOF10: 0
PAINLEVEL_OUTOF10: 3
PAINLEVEL_OUTOF10: 5

## 2022-05-07 ASSESSMENT — ENCOUNTER SYMPTOMS
VOMITING: 1
CONSTIPATION: 1
NAUSEA: 1
VOICE CHANGE: 0
WHEEZING: 0
DIARRHEA: 0
COUGH: 0
COLOR CHANGE: 0
ABDOMINAL DISTENTION: 0
EYE REDNESS: 0
CHEST TIGHTNESS: 0
SHORTNESS OF BREATH: 0
SORE THROAT: 0
TROUBLE SWALLOWING: 0
ABDOMINAL PAIN: 1
BACK PAIN: 0

## 2022-05-07 ASSESSMENT — PAIN DESCRIPTION - DESCRIPTORS
DESCRIPTORS: ACHING
DESCRIPTORS: ACHING;THROBBING

## 2022-05-07 ASSESSMENT — PAIN DESCRIPTION - LOCATION
LOCATION: HEAD

## 2022-05-07 ASSESSMENT — PAIN DESCRIPTION - ORIENTATION: ORIENTATION: LOWER

## 2022-05-07 NOTE — FLOWSHEET NOTE
05/07/22 1012   Encounter Summary   Service Provided For: Patient and family together   Referral/Consult From: [address] Children;Family members   Last Encounter  05/07/22   Complexity of Encounter Moderate   Begin Time 1005   End Time  1012   Total Time Calculated 7 min   Spiritual/Emotional needs   Type Spiritual Support   Assessment/Intervention/Outcome   Assessment Coping   Intervention Active listening;Prayer (assurance of)/[place]   Outcome Comfort;Coping   Assessment: In my encounter with the 80 yr old patient the pts family was supportively present. While rounding the unit 4D. I provided spiritual care to patient and their family through conversation, I also came to assess their spiritual needs present. The pt had emotional stress when she first came in but she's coping. The pt was admitted due to a brain mass. Interventions:  I provided, prayer, emotional support and words of comfort.  provided a listening presence and encouraged pt to share their beliefs and how they support him during their hospitalization. Outcomes: The patient was encouraged and didnt share any further spiritual needs at this time. The pt remains optimistic and hopeful. The pt shared that they were appreciative for the support. Plan:  Chaplains will follow-up at a later time for assessment of any spiritual care needs present.

## 2022-05-07 NOTE — CONSULTS
team as well as the ICU team.     *Time spent was at least 35 min of critical time evaluating patient, discussion with staff, planning for treatment and evaluation. The time was spent examining patient face to face, reviewing the images personally, reviewing the chart, perform high complexity decision making and speaking with the nursing staff regarding recommendations. There was a high probability of clinically significant life-threatening deterioration of the patient's condition requiring my urgent intervention. HISTORY OF PRESENT ILLNESS:  Sridhar Gauthier is a 80 y.o. female, admitted on :5/6/2022  7:05 PM  his is an 70-year-old female with past medical history significant for metastatic breast cancer and prior craniectomy in July 2021 for resection of right parieto-occipital lobe metastatic tumor followed by chemotherapy. Patient presented to the ER because of progressive history of headache associated with vomiting over the last 2 weeks. Patient underwent a brain MRI that showed:  Large irregular enhancing cystic mass on the right with surrounding edema.       Primary considerations would be recurrent malignancy versus abscess       Persistent linear enhancement along the meninges anterior left parietal    lobe. For this reason neurosurgery team was consulted. ROS:    Review of Systems   Constitutional: Positive for fatigue. HENT: Negative for congestion. Eyes: Positive for visual disturbance. Respiratory: Negative for chest tightness. Cardiovascular: Negative for chest pain. Gastrointestinal: Positive for nausea and vomiting. Genitourinary: Negative for difficulty urinating. Musculoskeletal: Negative for gait problem. Skin: Negative for color change. Neurological: Positive for dizziness, numbness and headaches. Psychiatric/Behavioral: Negative for confusion.        PROBLEM LIST:  Patient Active Problem List   Diagnosis    Hypothyroidism    GERD (gastroesophageal reflux disease)    Temporal arteritis (HCC)    Hyperlipidemia    Hypothyroidism due to acquired atrophy of thyroid    Steroid-induced hyperglycemia    Blind right eye    Current chronic use of systemic steroids    Personal history of cardiac dysrhythmia    Coronary artery disease involving native heart without angina pectoris    Anxiety disorder    H/O: CVA (cerebrovascular accident)    Lumbosacral spinal stenosis    Vitamin D deficiency    Hx of breast cancer with liver mets    Breast cancer metastasized to liver (HCC)    Traumatic ecchymosis of forehead    Brain mass    Platelet inhibition due to Plavix    Fall at home, initial encounter    Brain edema ([address]    Intraparenchymal hemorrhage of brain ([address]    Closed head injury    Metastatic breast cancer ([address]    Status post craniotomy    Impaired functional mobility, balance, gait, and endurance    Acute cystitis without hematuria    History of craniotomy    Generalized weakness    COVID-19    Hypotension    Pharyngitis    Thrombocytopenia (HCC)    Pulmonary nodules    Essential hypertension    Stenosis of left subclavian artery ([address]    COVID-19 virus infection    Brain metastases (HCC)       MEDICATIONS:   Prior to Admission medications    Medication Sig Start Date End Date Taking?  Authorizing Provider   ciprofloxacin (CIPRO) 500 MG tablet Take 1 tablet by mouth 2 times daily for 7 days 5/6/22 5/13/22  Sekou Benitez MD   levothyroxine (SYNTHROID) 100 MCG tablet take 1 tablet by mouth once daily 5/4/22   Ranjith Santos MD   ALPRAZolam Diane Hartmann) 0.25 MG tablet take 1 tablet by mouth twice a day if needed for anxiety 5/3/22 8/1/22  Ranjith Santos MD   isosorbide mononitrate (IMDUR) 30 MG extended release tablet take 1 tablet by mouth once daily 4/1/22   Ranjith Santos MD   predniSONE (DELTASONE) 10 MG tablet 2 tablets 2 times a day for 2 days then 1  Tablet 2 times a day for 2 days, then 1 tablet daily till gone 4/1/22   Bruce Pleitez MD   simvastatin (ZOCOR) 20 MG tablet take 1 tablet by mouth every evening 3/28/22   Bruce Pleitez MD   docusate (COLACE, DULCOLAX) 100 MG CAPS Take 100 mg by mouth 2 times daily 3/22/22   Bruce Pleitez MD   RA BIOTIN 1000 MCG TABS Take 1,000 mcg by mouth daily Patient taking it twice a day 3/22/22   Bruce Pleitez MD   RA P COL-RITE 8.6-50 MG per tablet take 1 to 2 tablets by mouth daily if needed for constipation 3/18/22   Ann Olvera MD   Nebulizers (COMPRESSOR/NEBULIZER) MISC 1 Device by Does not apply route 4 times daily as needed (Shortness of breath and wheezing) 3/3/22   Bruce Pleitez MD   albuterol (PROVENTIL) (2.5 MG/3ML) 0.083% nebulizer solution Take 3 mLs by nebulization every 6 hours as needed for Wheezing 2/28/22   Chad Bustos MD   polyethylene glycol [facility]) 17 GM/SCOOP powder Take 17 g by mouth daily as needed (constipation) 2/16/22 8/15/22  Bruce Pleitez MD   pantoprazole (PROTONIX) 40 MG tablet take 1 tablet by mouth once daily 2/10/22   Bruce Pleitez MD   zinc sulfate (ZINCATE) 220 (50 Zn) MG capsule Take 1 capsule by mouth daily 1/28/22   Chad Bustos MD   albuterol sulfate  (90 Base) MCG/ACT inhaler Inhale 2 puffs into the lungs every 6 hours as needed for Wheezing or Shortness of Breath 1/25/22   Tamela Araya MD   predniSONE (DELTASONE) 1 MG tablet Take 1 tablet by mouth daily - resume after completes decadron 1/25/22   Tamela Araya MD   levETIRAcetam (KEPPRA) 500 MG tablet take 1 tablet by mouth twice a day 11/16/21   Princess El MD   loratadine (CLARITIN) 10 MG tablet take 1 tablet by mouth once daily 11/16/21   Bruce Pleitez MD   REFRESH PLUS 0.5 % SOLN ophthalmic solution use as directed 9/9/21   Historical Provider, MD   ondansetron (ZOFRAN-ODT) 4 MG disintegrating tablet Take 4 mg by mouth every 8 hours as needed 8/5/21   Historical Provider, MD   traZODone (DESYREL) 50 MG tablet take 1 tablet by mouth at bedtime if needed for sleep 8/16/21   Lyssa Nino MD   lidocaine 4 % external patch Apply 2 patches topically daily 7/21/21   Camelia Gonzales MD   melatonin 3 MG TABS tablet Take 1 tablet by mouth nightly as needed (insomnia) 7/20/21   Camleia Gonzales MD   Nutritional Supplements (ENSURE ORIGINAL) LIQD Take 1 Can by mouth 2 times daily 5/13/21   Lyssa Nino MD   sertraline (ZOLOFT) 50 MG tablet take 1 tablet by mouth twice a day 4/20/21   Lyssa Nino MD   Calcium Carbonate-Vitamin D (OYSTER SHELL CALCIUM/D) 500-200 MG-UNIT TABS take 1 tablet by mouth twice a day 3/10/21   Lyssa Nino MD   RA ASPIRIN EC ADULT LOW ST 81 MG EC tablet take 1 tablet by mouth once daily 10/13/20   Historical Provider, MD   vitamin D (ERGOCALCIFEROL) 1.25 MG (52709 UT) CAPS capsule take 1 capsule by mouth every week 7/5/20   Historical Provider, MD   promethazine (PHENERGAN) 25 MG tablet Take 1 tablet by mouth every 6 hours as needed for Nausea (and vomiting) 8/11/20   Lyssa Nino MD   Multiple Vitamins-Minerals (CENTRUM SILVER ADULT 50+) TABS Take 1 tablet by mouth daily 12/7/15   Romi Boss MD       Current Facility-Administered Medications   Medication Dose Route Frequency Provider Last Rate Last Admin    docusate sodium (COLACE) capsule 100 mg  100 mg Oral BID Jilda Casa, APRN - CNP   100 mg at 05/07/22 4713    levothyroxine (SYNTHROID) tablet 100 mcg  100 mcg Oral Daily Jilda Casa, APRN - CNP   100 mcg at 05/07/22 0649    melatonin tablet 3 mg  3 mg Oral Nightly PRN Jilda Casa, APRN - CNP        pantoprazole (PROTONIX) tablet 40 mg  40 mg Oral QAM AC Jilda Casa, APRN - CNP   40 mg at 05/07/22 0649    polyethylene glycol (GLYCOLAX) packet 17 g  17 g Oral Daily PRN Jilda Casa, APRN - CNP        aspirin EC tablet 81 mg  81 mg Oral Daily Jilda Casa, APRN - CNP   81 mg at 05/07/22 4688    sertraline (ZOLOFT) tablet 50 mg  50 mg Oral BID Jilda Casa, APRN - CNP        atorvastatin (LIPITOR) tablet 10 mg  10 mg Oral Nightly Kelsie Ferrara APRN - CNP        dexamethasone (DECADRON) tablet 4 mg  4 mg Oral 4 times per day German Deluna MD   4 mg at 05/07/22 0649    sodium chloride 3 % solution  25 mL/hr IntraVENous Continuous Kelsie Remak, APRN - CNP 25 mL/hr at 05/07/22 0700 25 mL/hr at 05/07/22 0700    levETIRAcetam (KEPPRA) 500 mg in sodium chloride 0.9 % 100 mL IVPB  500 mg IntraVENous Q12H Kelsie Ferrara, APRN - CNP   Stopped at 05/07/22 0109    enoxaparin (LOVENOX) injection 40 mg  40 mg SubCUTAneous Daily Kelsie Ferrara APRN - CNP        ondansetron (ZOFRAN-ODT) disintegrating tablet 4 mg  4 mg Oral Q8H PRN Kelsie Remak, APRN - CNP        Or    ondansetron [facility]) injection 4 mg  4 mg IntraVENous Q6H PRN Kelsie Ferrara, APRN - CNP            melatonin, 3 mg, Nightly PRN  polyethylene glycol, 17 g, Daily PRN  ondansetron, 4 mg, Q8H PRN   Or  ondansetron, 4 mg, Q6H PRN         sodium chloride 25 mL/hr (05/07/22 0700)         ALLERGIES:   Bactrim [sulfamethoxazole-trimethoprim], Demerol, and Pcn [penicillins]    PAST MEDICAL  HISTORY:    has a past medical history of Anxiety, Blind right eye, Breast cancer ([facility] Breast cancer metastasized to liver (5/16) and brain (7/21) ([facility] Carotid stenosis, Colostomy in place [facility]), Degenerative arthritis of cervical spine, GERD (gastroesophageal reflux disease), History of craniotomy, Hx antineoplastic chemo, Hypercholesteremia, Hypoestrogenism, Hypothyroidism, Lumbago, Lumbosacral spinal stenosis, MDRO (multiple drug resistant organisms) resistance, Metastasis ([address] Personal history of cardiac dysrhythmia, Respiratory tract infection due to COVID-19 virus, Sigmoid diverticulosis, Spondylolisthesis of lumbosacral region, Steroid-induced hyperglycemia, Subclavian artery stenosis ([facility] Superior and Inferior Pubic ramus fracture, right, closed, initial encounter ([address] SVT (supraventricular tachycardia) ([address] Temporal arteritis ([address] Vertebral artery occlusion, and Vitamin D deficiency. PAST SURGICAL  HISTORY:    has a past surgical history that includes Dilation and curettage of uterus; Cholecystectomy (1/03); Cardiac catheterization (5/07); Cataract removal (right 1/08; left 2/08); subclavian / pulmonary shunt (2002); Colonoscopy (11/06); Upper gastrointestinal endoscopy (11/06); Hysterectomy; eye surgery; other surgical history (05/27/2016); Eye surgery (Right, 11/06/2017); pr office/outpt visit,procedure only (N/A, 9/4/2018); colostomy (09/04/2018); Endoscopy, colon, diagnostic; Eye removal (Right, 03/2017); craniotomy (Right, 7/2/2021); and US BREAST BIOPSY W LOC DEVICE 1ST LESION LEFT (Left, 05/23/2016).     SOCIAL HISTORY:   Social History     Tobacco Use    Smoking status: Former Smoker     Packs/day: 0.50     Types: Cigarettes    Smokeless tobacco: Never Used   Substance Use Topics    Alcohol use: No       FAMILY HISTORY:  Family History   Problem Relation Age of Onset    Breast Cancer Sister 61        breast    Cancer Brother 79        lung    Cancer Brother 68        colon    Cancer Son 48        lung       LABS  CBC:   Lab Results   Component Value Date    WBC 7.4 05/06/2022    RBC 4.48 05/06/2022    RBC 4.23 04/08/2019    HGB 13.0 05/06/2022    HCT 41.6 05/06/2022    MCV 92.9 05/06/2022    MCH 29.0 05/06/2022    MCHC 31.3 05/06/2022    RDW 17.0 04/08/2019     05/06/2022    MPV 11.8 05/06/2022     CMP:    Lab Results   Component Value Date     05/07/2022    K 4.4 05/07/2022    K 3.8 05/06/2022     05/07/2022    CO2 23 05/07/2022    BUN 18 05/07/2022    CREATININE 0.6 05/07/2022    AGRATIO 1.7 07/23/2018    LABGLOM >90 05/07/2022    GLUCOSE 131 05/07/2022    GLUCOSE 103 07/23/2018    PROT 6.4 05/06/2022    LABALBU 4.2 05/06/2022    CALCIUM 8.8 05/07/2022    BILITOT 0.5 05/06/2022    BILITOT NEGATIVE 11/13/2018    ALKPHOS 43 05/06/2022    AST 24 05/06/2022    ALT 14 05/06/2022     PT/INR:    Lab Results Component Value Date    INR 0.97 07/02/2021       RADIOLOGY:  Pertinent images have been reviewed. Brain MRI showed:    Large irregular enhancing cystic mass on the right with surrounding edema.       Primary considerations would be recurrent malignancy versus abscess       Persistent linear enhancement along the meninges anterior left parietal    lobe. EXAMINATION:  Patient awake alert and oriented x3  GCS: 15/15   Pupils: equal and reactive. 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 She has significant decreased vision in the left (chronic old findings). FCx4 with good strength through out   Sensory: grossly intact  Reflexes: 2+ through out.   Planter reflex: Down response    Kaylan Higginbotham MD,MD  Electronically signed 5/7/2022

## 2022-05-07 NOTE — PROGRESS NOTES
0915: Sodium level from 0820 results 139, Dr. Branden Hui aware at this time - awaiting orders  0920: no change to 3% gtt. Per Dr. Jaydon Martin  4308: Dr. Jaydon Martin orders sodium labs Q4H  1037: Maria Luisa DUNAWAY with neurosurgery rounded on patient  : Dr. Wilian Price at bedside assessing patient  1115: Dr. Jaydon Martin orders sodium lab results Q8H  [id number]: Perfect serve consult sent to oncology MD Kenisha Meyer MD  \"Consult from ICU MD for patient with recurrent brain mets from breast cancer. \" - awaiting read response  [phone]: Radiology oncology does not cover weekends - Dr. Jadyon Martin okay to wait until Monday to consult   1530: Dr. Jaydon Martin updated on patient with decreased urine output  - 1 incontinent pad and bladder scan showing 284ml - Dr. Jaydon Martin orders garcia placement for accurate I&O for acute patient '  1545:  Garcia catheter inserted w/o complication - urinary culture sent post placement per protocol per charge YADY Figueroa   2963: Vitals stable, BP within goal range, urine output low - Dr. Jaydon Martin updated - 500ml LR bolus ordered at this time

## 2022-05-07 NOTE — PLAN OF CARE
Problem: Discharge Planning  Goal: Discharge to home or other facility with appropriate resources  Description: Discharge planning in progress. Flowsheets (Taken 5/7/2022 0222)  Discharge to home or other facility with appropriate resources:   Identify barriers to discharge with patient and caregiver   Arrange for needed discharge resources and transportation as appropriate     Problem: Pain  Goal: Verbalizes/displays adequate comfort level or baseline comfort level  Description: PRN Norco for pain management. Encouraged to report pain on pain scale. Flowsheets (Taken 5/7/2022 0222)  Verbalizes/displays adequate comfort level or baseline comfort level:   Encourage patient to monitor pain and request assistance   Assess pain using appropriate pain scale     Problem: Safety - Adult  Goal: Free from fall injury  Description: Family at bedside. Bed alarm activated. Flowsheets (Taken 5/7/2022 0222)  Free From Fall Injury:   Instruct family/caregiver on patient safety   Based on caregiver fall risk screen, instruct family/caregiver to ask for assistance with transferring infant if caregiver noted to have fall risk factors     Problem: Chronic Conditions and Co-morbidities  Goal: Patient's chronic conditions and co-morbidity symptoms are monitored and maintained or improved  Description: Monitoring co-morbidities. Flowsheets (Taken 5/7/2022 0222)  Care Plan - Patient's Chronic Conditions and Co-Morbidity Symptoms are Monitored and Maintained or Improved:   Monitor and assess patient's chronic conditions and comorbid symptoms for stability, deterioration, or improvement   Collaborate with multidisciplinary team to address chronic and comorbid conditions and prevent exacerbation or deterioration     Problem: Nutrition Deficit:  Goal: Optimize nutritional status  Description: NPO at this time. Monitoring for nutritional needs.    Flowsheets (Taken 5/7/2022 0222)  Nutrient intake appropriate for improving, restoring, or maintaining nutritional needs:   Assess nutritional status and recommend course of action   Monitor oral intake, labs, and treatment plans     Problem: ABCDS Injury Assessment  Goal: Absence of physical injury  Description: No physical injury noted. Flowsheets (Taken 5/7/2022 0222)  Absence of Physical Injury: Implement safety measures based on patient assessment     Problem: Skin/Tissue Integrity  Goal: Absence of new skin breakdown  Description: 1. Monitor for areas of redness and/or skin breakdown  2. Assess vascular access sites hourly  3. Every 4-6 hours minimum:  Change oxygen saturation probe site  4. Every 4-6 hours:  If on nasal continuous positive airway pressure, respiratory therapy assess nares and determine need for appliance change or resting period. Note: No new breakdown noted.

## 2022-05-07 NOTE — ED NOTES
ED to inpatient nurses report    Chief Complaint   Patient presents with    Abdominal Pain      Present to ED from home  LOC: alert and orientated to name, place, date  Vital signs   Vitals:    05/06/22 1911 05/06/22 2010 05/06/22 2120   BP: 137/69 91/64 134/75   Pulse: 66 60 62   Resp: 20 18 20   Temp: 97.5 °F (36.4 °C)     TempSrc: Oral     SpO2: 93% 95% 96%   Weight: 130 lb (59 kg)     Height: 5' (1.524 m)        Oxygen Baseline 2L    Current needs required 2L Bipap/Cpap No  LDAs:   Peripheral IV 05/06/22 Right Antecubital (Active)   Site Assessment Clean, dry & intact 05/06/22 2121   Line Status Infusing 05/06/22 2121   Phlebitis Assessment No symptoms 05/06/22 2121   Infiltration Assessment 0 05/06/22 2121   Dressing Status Clean, dry & intact 05/06/22 2121   Dressing Intervention New 05/06/22 1923     Mobility: Requires assistance * 1  Pending ED orders: Complete  Present condition: Stable    Electronically signed by Vidal England RN on 5/6/2022 at 9:53 PM     Vidal England [facility]  05/06/22 8108

## 2022-05-07 NOTE — FLOWSHEET NOTE
0023- Notified Magda Toure CNP of stat sodium level of 140. Okay to start 3% sodium at ordered 25ml/hr. 0200- Attempted to draw serial sodium level X2, called lab requesting phlebotomy to come up and draw. Awaiting lab at this time, notified Magda Toure CNP of delay in sodium draw. 0230- Multiple RN's attempted to draw labs X2 each, unable to get sodium level. Notified Magda Toure CNP.    0300- Lab attempted 0200 sodium draw, did get some blood. Results pending. 6992- Sodium resulted at 132, Magda Toure CNP aware, results not consistent. Will discuss with pt regarding central line placement for lab draws. [address] CNP at bedside discussing line placement. [id number] Mcdonald CNP at bedside for central line placement. Time out performed. Consent obtained prior to procedure. Monitoring. [facility] Notified Magda Toure CNP of sodium result of 138, continue 3% infusion at 25 ml/hr, will reassess at next level. Okay to use central line for infusion. 12- Spoke with Magda Toure CNP, okay to adjust times on q2hr sodium labs. Changed to 0800 and q2hr following due to late lab results from poor access.

## 2022-05-07 NOTE — FLOWSHEET NOTE
[place] [address] PROGRESS NOTE      Patient: Sidra Childers  Room #: 04/004A            YOB: 1933  Age: 80 y.o. Gender: female            Admit Date & Time: 2022  7:05 PM    Assessment:  Interventions:   Outcomes:       called to ER 4 by nurse at request for prayer from patient and family member  Patient was exhibiting emotional ldistress and reporting physical pain; Patient was asking about her personal ; a [employer] [facility] [employer] [facility] reviews ;  connected with patient and led out in numerous prayers; Patient spoke of being ready to see Sridevi Qiu sang several older songs/humns/spirituals  Patient spoke of a son who  in the past and her desire to see him. We ended with the The Lord's Prayer. Family asked for another visit. Plan:    1. Patient to be admitted; Will desire prayer and support    Electronically signed by Aamir Nguyen on 2022 at 10:01 PM.  [facility]  [phone]                 22   Encounter Summary   Encounter Overview/Reason  Initial Encounter;Spiritual/Emotional Needs;Grief, Loss, and Adjustments   Service Provided For: Patient;Patient and family together   Referral/Consult From: Nurse   Support System Children;Family members   Last Encounter  22   Complexity of Encounter High  (Patient reporting significant pain; preparing for MRI & admi)   Begin Time    End Time     Total Time Calculated 23 min   Encounter    Type Initial Screen/Assessment   Crisis   Type Emotional distress   Spiritual/Emotional needs   Type Spiritual Distress; Other (comment)  (reporting pain)   Rituals, Rites and Sacraments   Type Blessings  (Sang several old torsten and spirituals with patient)   Grief, Loss, and Adjustments   Type Grief and loss; Other (Comment)  (reflecting on loss of loved ones)   Assessment/Intervention/Outcome   Assessment Anxious; Complicated grieving;Concerns with suffering; Hopeful; Other (Comment)  (Verbalizing deep mary jane)   Intervention Active listening;Grief Care;Sustaining Presence/Ministry of presence

## 2022-05-07 NOTE — H&P
Patient:  Hortencia De Anda    Unit/Bed:4D-08/008-A  MRN: [id number]   PCP: Jenifer Hampton MD  Date of Admission: 5/6/2022    Assessment and Plan(All pulmonary edema, renal failure, PE, and respiratory failure diagnoses are acute in nature unless otherwise specified):        1. Multiple right-sided cystic brain masses, with cerebral edema: These are reportedly new findings from prior imaging. In the past, patient had right-sided metastatic brain tumor resection/debulking (7/2021), and patient states she last received chemotherapy 3-4 months ago. Follows with neurosurgery and oncology (Dr. Lori Khan) as outpatient. MRI ordered and pending. Continue home keppra seizure prophylaxis. Neurochecks. Keep head of bed elevated. Keep -160. Neurosurgery consulted. They recommended Decadron 4 mg every 6 hours, as well as hypertonic saline. Appreciate further recs. 2. Breast cancer, history of: With metastasis to the liver in the past.  States she was diagnosed in 2016, and completed chemotherapy course. States she follows with Dr. Jeannine Miller as outpatient. 3. Mild abdominal pain/discomfort: Abdomen is largely soft and nondistended on exam.  Bladder does feel firm. Patient states she has not urinated since this morning. Obtain bladder scan. Straight cath as indicated. Obtain urinalysis and urine culture. Otherwise, no tenderness to palpation of other quadrants. Lipase was negative. LFTs are within normal limits. Denies any diarrhea or abdominal cramping. States she has not had a bowel movement today. Home colace ordered. Administer glycolax daily as needed. 4. Temporal arteritis, history of: Endorses right temporal headache. ESR within normal limits. On Decadron as above. Has prosthetic eye on the right side. 5. Hyperlipidemia, history of: Lipitor. 6. GERD, history of: PPI. 7. Hypothyroidism, history of: Continue Synthroid. 8. Anxiety, history of: Continue Zoloft.       CC: Lower extremity numbness, nausea/vomiting. 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 Patient presented to [facility] ED on 5/6/2022 for multiple complaints, chiefly lower extremity numbness, headaches, and nausea/vomiting. Patient had reported 2-week history of progressive headaches. She had reported 3-4 headaches a week. She also endorsed difficulty sleeping. She reported that the nausea/vomiting started a few days ago. She reported some lower abdominal pain, and thought that it might be bladder related. She had also reported recent subjective chills. She endorsed recent fatigue. She denies any recent dysuria or urgency, but did report decreased urine output. The last time she urinated was this morning. In the ED, lab work was grossly within normal limits. Troponin was notably negative. Lipase was 20.4. LFTs were stable. Renal function was at her baseline. She had no leukocytosis. Twelve-lead ECG was negative for acute ischemic changes. She underwent CT head without contrast, which was notable for reported new cerebral edema in the right temporal, occipital and parietal lobes. There also appeared to be underlying cystic masses, with the largest measuring 2 cm. Consultation was called to Dr. Ernestina Hand with neurosurgery. He recommended admission to ICU for close neurologic monitoring, as well as starting Decadron and hypertonic saline. MRI brain was ordered. Patient was admitted to the ICU in stable condition. For further details, please see assessment and plan.       ROS: Review of Systems   Constitutional: Positive for fatigue. Negative for chills, diaphoresis and fever. HENT: Negative for congestion, sore throat, trouble swallowing and voice change. Eyes: Positive for visual disturbance (chronic; blind in right eye). Negative for redness. Respiratory: Negative for cough, chest tightness, shortness of breath and wheezing. Cardiovascular: Negative for chest pain, palpitations and leg swelling. Gastrointestinal: Positive for abdominal pain (lower abdomen, over urinary bladder), constipation, nausea and vomiting. Negative for abdominal distention and diarrhea. Genitourinary: Negative for difficulty urinating and dysuria. Musculoskeletal: Negative for back pain and neck pain. Skin: Negative for color change and pallor. Neurological: Positive for numbness (left foot) and headaches. Negative for dizziness, seizures, facial asymmetry, speech difficulty, weakness and light-headedness. Psychiatric/Behavioral: Negative for agitation and confusion. The patient is not nervous/anxious. PMH:  Per HPI  SHX: Former smoker. Denies alcohol or substance use. FHX: Sister-breast cancer. Brother- lung cancer, colon cancer. Son- lung cancer. Allergies: Bactrim, Demerol, penicillins. Medications:       dexamethasone  4 mg Oral 4 times per day     Vital Signs:   T: 97.9: P: 72 RR: 18 B/P: 114/48: FiO2: 2L NC: O2 Sat:100%: I/O: no data GCS: 15  Body mass index is 25.39 kg/m². [facility]:   Acute on chronically ill adult female. Appears stated age. HEENT:  normocephalic and atraumatic. No scleral icterus. PERR  Neck: supple. No Thyromegaly. Lungs: clear to auscultation. No retractions  Cardiac: RRR. No JVD. Abdomen: soft. Mildly tender to bladder palpation. No guarding. BS present. Extremities:  No clubbing, cyanosis, or edema x 4. Vasculature: capillary refill < 3 seconds. Palpable dorsalis pedis pulses. Skin:  warm and dry. Psych:  Alert and oriented x3.   Affect appropriate  Lymph:  No supraclavicular adenopathy. Neurologic:  No focal deficit. No seizures. Data: (All radiographs, tracings, PFTs, and imaging are personally viewed and interpreted unless otherwise noted).  Telemetry: NSR.    12-lead ECG 5/6/22: NSR. Negative for acute ischemic changes.  Sodium 140 potassium 3.8 chloride 102 CO2 24 BUN 20 creatinine 0.7 glucose 129 calcium 9.4   Troponin <0.010   Albumin 4.2 alk phos 43 ALT 14 AST 24 bilirubin 0.5 lipase 20.4 total protein 6.4   WBC 7.4 hemoglobin 13.0 hematocrit 41.6 platelets 802   ESR 2   Flu A/B antigens: Negative   COVID-19 screening: Negative   CT head without contrast 5/6/2022 report: New edema right temporal, occipital and parietal lobes. There appear to be underlying cystic masses, 1 of which measures 2 cm. Case discussed with Dr. Tima Jimenez.     Electronically signed by LUCILA Jones

## 2022-05-07 NOTE — PROGRESS NOTES
Attending Note:     Please see my today separate consultation note for this patient. Sharon Freed MD       Neurosurgery Progress Note    Patient:  Eloisa Fortune      Unit/Bed:4D-08/008-A    YOB: 1933    MRN: [id number]     Acct: [de-identified]     Admit date: 5/6/2022    Chief Complaint   Patient presents with    Abdominal Pain       Patient Seen, Chart, Physician notes, Labs, Radiology studies reviewed. Subjective: The patient is seen and evaluated in the ICU setting with evaluation exam findings reviewed discussed with Dr. Bakari Gallardo and with nursing. This is a patient familiar with our service having undergone a prior craniotomy for resection of right anterior brain lesion performed Dr. Bakari Gallardo on 1/2/5530, without complication. She was resting comfortably this morning, at the time of exam.  She noted some improvement of headaches and was absent nausea or vomiting. Past, Family, Social History unchanged from admission. Diet:  ADULT DIET; Regular    Medications:  Scheduled Meds:   docusate sodium  100 mg Oral BID    levothyroxine  100 mcg Oral Daily    pantoprazole  40 mg Oral QAM AC    aspirin  81 mg Oral Daily    sertraline  50 mg Oral BID    atorvastatin  10 mg Oral Nightly    dexamethasone  4 mg Oral 4 times per day    levetiracetam  500 mg IntraVENous Q12H    enoxaparin  40 mg SubCUTAneous Daily     Continuous Infusions:   sodium chloride 25 mL/hr (05/07/22 0700)     PRN Meds:melatonin, polyethylene glycol, ondansetron **OR** ondansetron    Objective: She is observed lying in bed with head of the bed mildly elevated and was comfortable. Pain was well controlled and she demonstrated clear appropriate speech and participated in discussions involving her care appropriately.   Some mild confusion and disorientation noted with no new focal deficits    Vitals: BP (!) 106/52   Pulse 68   Temp 98.4 °F (36.9 °C)   Resp 12   Ht 5' (1.524 m)   Wt 141 lb 8.6 oz (64.2 kg)   LMP  (LMP Unknown) Comment: age 52 ovaries intact  SpO2 98%   BMI 27.64 kg/m²     Physical Exam     Physical Exam:  Mild confusion and disorientation is noted. Language appropriate, with no aphasia. Pupils equal.  Facial strength symmetric. Review of Systems     Constitutional: Positive for fatigue. HENT: Negative for congestion. Eyes: Positive for visual disturbance. Respiratory: Negative for chest tightness. Cardiovascular: Negative for chest pain. Gastrointestinal: Positive for nausea and vomiting. Genitourinary: Negative for difficulty urinating. Musculoskeletal: Negative for gait problem. Skin: Negative for color change. Neurological: Positive for dizziness and headaches. Psychiatric/Behavioral: Negative for confusion. 24 hour intake/output:    Intake/Output Summary (Last 24 hours) at 5/7/2022 1115  Last data filed at 5/7/2022 0700  Gross per 24 hour   Intake 219.29 ml   Output 450 ml   Net -230.71 ml     Last 3 weights: Wt Readings from Last 3 Encounters:   05/06/22 141 lb 8.6 oz (64.2 kg)   05/06/22 134 lb (60.8 kg)   01/22/22 134 lb (60.8 kg)         CBC:   Recent Labs     05/06/22 1921   WBC 7.4   HGB 13.0        BMP:    Recent Labs     05/06/22  1921 05/07/22  0023 05/07/22  0331 05/07/22  0554 05/07/22  0820      < > 132* 138 139   K 3.8  --   --  4.4  --      --   --  104  --    CO2 24  --   --  23  --    BUN 20  --   --  18  --    CREATININE 0.7  --   --  0.6  --    GLUCOSE 129*  --   --  131*  --     < > = values in this interval not displayed. Calcium:  Recent Labs     05/07/22  0554   CALCIUM 8.8     Magnesium:  Recent Labs     05/07/22  0554   MG 1.7     Glucose:No results for input(s): POCGLU in the last 72 hours. HgbA1C: No results for input(s): LABA1C in the last 72 hours. Lipids: No results for input(s): CHOL, TRIG, HDL, LDL, LDLCALC in the last 72 hours.     Radiology reports as per the Radiologist  Radiology: CT Head WO Contrast    Result Date: 5/6/2022  CT head without contrast Comparison: 10/29/2021 10:19 AM EDT Findings: There has been prior right temporoparietal craniotomy. There is new edema right temporal lobe and occipital lobe. This extends into the posterior right parietal lobe as well. There appear to be underlying cystic lesions in this region. Largest measures 2 cm, not well seen on noncontrasted CT. Follow-up contrast enhanced MRI recommended to assess for recurrent mass No acute intracranial hemorrhage is demonstrated. No acute bony abnormalities. Sinuses and mastoids clear. Right ocular prosthesis. Impression: New edema right temporal, occipital and parietal lobes. There appear to be underlying cystic masses, 1 of which measures 2 cm. These are not well assessed, recommend follow-up contrast-enhanced MRI. This document has been electronically signed by: Alexandra Hazel MD on 05/06/2022 09:06 PM All CTs at this facility use dose modulation techniques and iterative reconstructions, and/or weight-based dosing when appropriate to reduce radiation to a low as reasonably achievable. XR CHEST PORTABLE    Result Date: 5/7/2022  EXAM: CHEST X-RAY, SINGLE VIEW PORTABLE: COMPARISON:  CR,SR - XR CHEST PORTABLE - 05/07/2022 01:01 AM EDT FINDINGS: Right internal jugular central line has been placed, tip is at the cavoatrial junction. Heart size remains within normal limits. Central vascular congestion is again suspected. Question of new infiltrate or pneumonitis in the right lung base, not present on the earlier study. 1. Unremarkable central line placement. 2. Question of new infiltrate or pneumonitis in right lung base. This document has been electronically signed by: Sarah Tariq MD on 05/07/2022 06:10 AM    XR CHEST PORTABLE    Result Date: 5/7/2022  1 view chest x-ray. Comparison: 1/23/2022, 6/3/2019 Findings: Lungs are clear without acute infiltrates. Stable prominence right medial apex.  Suspect this is vascular prominence. No pneumothorax. Heart size normal. No acute bony abnormalities. Impression: No acute processes. This document has been electronically signed by: Xiomara Ayers MD on 05/07/2022 04:25 AM    MRI BRAIN W WO CONTRAST    Result Date: 5/6/2022  MR brain without and with contrast Comparison: CT same date, MRI 10/21/2021 Findings: Multilocular cystic enhancing lesion right parieto-occipital lobes. Finding appears to represent a single large lesion rather than multiple. Overall measurements somewhat difficult due to the irregular configuration. Findings approximately 2.7 cm transverse by 4.5 cm AP by 4 cm height. There is overlying thickening and enhancement of the meninges. Possibilities include recurrent malignancy versus abscess. There is surrounding edema right temporal, parietal and occipital lobes. No other abnormal areas of contrast enhancement demonstrated. Nonspecific dural thickening and enhancement anterior left lobe. Prior study images obscure the lesion with measurement calipers. Finding is grossly unchanged from this previous study. No acute signal abnormalities on diffusion-weighted imaging. Chronic ischemic white matter disease noted with mild volume loss. Midline structures are intact and within normal limits. Normal flow voids are noted within the vascular structures. Sinuses and mastoids are clear. Right ocular prosthesis. Impression: Large irregular enhancing cystic mass on the right with surrounding edema. Primary considerations would be recurrent malignancy versus abscess Persistent linear enhancement along the meninges anterior left parietal lobe.  This document has been electronically signed by: Xiomara Ayers MD on 05/06/2022 11:54 PM                   Assessment: Newly diagnosed brain mass with associated significant midline shift    Principal Problem:    Brain mass  Active Problems:    Brain metastases ([address]  Resolved Problems:    * No resolved hospital problems. *        Plan: Patient is seen and evaluated in the ICU setting with evaluation exam findings reviewed discussed with Dr. Vadim Chapa and with nursing. Is a patient known to our service having undergone a prior craniotomy and resection of anterior tumor performed by Dr. Vadim Chapa on 4/8/5837, without complication. She was resting comfortably this morning with some improvement of headache absent the nausea and vomiting from admission. She presented with clear appropriate speech and participated in discussions involving her care appropriately with some confusion and disorientation continuing. My the brain reveals large irregular enhancing cystic mass on the right with surrounding edema with concerns for recurrence of malignancy versus abscess. Neurosurgery recommends hypertonic 3% saline with sodium goals of 1 45-1 50 along with seizure precautions and Decadron 4 mg every 6. Bow protocol imaging is pending medical management per ICU team to continue.   Neurosurgery to follow      Electronically signed by Holly Field PA-C on 5/7/2022 at 11:15 AM    Neurosurgery

## 2022-05-07 NOTE — ED NOTES
Pt medicated per MAR. EKG obtained. Updated on POC. Warm blanket provided for comfort.  [address] [facility], [facility]  05/06/22 2012

## 2022-05-07 NOTE — PROCEDURES
Central Venous Catheterization Procedure Note    Indication:  [x] Lack of adequate peripheral IV access  [] Infusion of medications with high risk of extravasation injury  [] Hemodynamic monitoring  [] Hemodialysis  [] Extracorporeal therapies  [] Other:                     Time Out:  [x] Time out was completed immediately prior to the start of the procedure, which included verification of the correct patient, correct site and agreement on the procedure to be done. [] Time out was not done because procedure was emergent    Consent:  [x] The patient/surrogate decision maker was informed of the procedure indications, risks, benefits and alternatives. Questions were answered and consent was obtained to proceed with the procedure. [] Consent was not obtained, because the procedure was emergent, or the patient was unable to consent and the surrogate decision maker was not available. Type of Central Line Being Placed:   [x] Central venous    [] Hemodialysis  [] Pulmonary artery    Location:   [x] Internal Jugular Vein [x] Right [] Left  [] Subclavian Vein  [] Right [] Left  [] Femoral Vein   [] Right [] Left      Central Line Bundle:  [x] I washed/disinfected my hands prior to starting procedure  [x] Hat      [x] Mask      [x] Sterile gown      [x] Sterile gloves were worn throughout the procedure  [x] Everyone in the room during the procedure wore a mask  [x] Chlorhexidine was utilized to prep the skin and allowed to dry completely  [x] Full body drape was used to cover the patient    Ultrasound Guidance:   [x] Yes      [] No    Anesthetic Used:  [x] Lidocaine      [] Other    Sterile Technique Used Throughout Procedure? [x] Yes      [] No    Description/Findings:   [x] Dark, non-pulsatile blood return obtained from all ports  [] Appropriate waveform(s) seen  [] Other    Complications:  [x] None apparent  [] Other:    EBL: less than 5 cc    Follow-up CXR: Appropriate line placement.  No pneumothorax. Procedure Performed By: Magnus Homans, APRN-CNP. Assistant(s): If supervised: Dr. Kofi Magdaleno was physically present and supervised, all key elements of this procedure.     Electronically signed by Magnus Homans, APRN - CNP on 5/7/2022 at 6:00 AM

## 2022-05-07 NOTE — ED NOTES
Dr Vic Gibson at bedside with update. Pt reminded of need for urine specimen. Family now at bedside.  [facility]  05/06/22 2126

## 2022-05-08 LAB
ACCELERATED MAX AMPLITUDE: 60.2 MM (ref 52–70)
ANION GAP SERPL CALCULATED.3IONS-SCNC: 8 MEQ/L (ref 8–16)
BUN BLDV-MCNC: 19 MG/DL (ref 7–22)
CALCIUM SERPL-MCNC: 8.1 MG/DL (ref 8.5–10.5)
CHLORIDE BLD-SCNC: 104 MEQ/L (ref 98–111)
CITRATED KAOLIN ANGLE: 68.6 DEG (ref 63–78)
CITRATED KAOLIN K TIME: 1.7 MINUTES (ref 0.8–2.1)
CITRATED KAOLIN MAX AMPLITUDE: 57.7 MM (ref 52–69)
CITRATED KAOLIN R TIME: 7.8 MINUTES (ref 4.6–9.1)
CO2: 26 MEQ/L (ref 23–33)
CREAT SERPL-MCNC: 0.7 MG/DL (ref 0.4–1.2)
ERYTHROCYTE [DISTWIDTH] IN BLOOD BY AUTOMATED COUNT: 13.9 % (ref 11.5–14.5)
ERYTHROCYTE [DISTWIDTH] IN BLOOD BY AUTOMATED COUNT: 47.7 FL (ref 35–45)
FIBRINOGEN MAX AMPLITUDE: 19.8 MM (ref 15–32)
FUNCT FIBRINOGEN LEVEL: 361.3 MG/DL (ref 278–581)
GFR SERPL CREATININE-BSD FRML MDRD: 79 ML/MIN/1.73M2
GLUCOSE BLD-MCNC: 145 MG/DL (ref 70–108)
HCT VFR BLD CALC: 40.3 % (ref 37–47)
HEMOGLOBIN: 12.3 GM/DL (ref 12–16)
HEPARINASE R TIME: 6.8 MINUTES (ref 4.3–8.3)
MCH RBC QN AUTO: 28.8 PG (ref 26–33)
MCHC RBC AUTO-ENTMCNC: 30.5 GM/DL (ref 32.2–35.5)
MCV RBC AUTO: 94.4 FL (ref 81–99)
PLATELET # BLD: 152 THOU/MM3 (ref 130–400)
PLATELET FUNCTION INTERPRETATION: NORMAL SECONDS
PLT FUNCTION, EPI: 160 SECONDS (ref 68–175)
PMV BLD AUTO: 12.3 FL (ref 9.4–12.4)
POTASSIUM SERPL-SCNC: 3.9 MEQ/L (ref 3.5–5.2)
RBC # BLD: 4.27 MILL/MM3 (ref 4.2–5.4)
SODIUM BLD-SCNC: 138 MEQ/L (ref 135–145)
SODIUM BLD-SCNC: 139 MEQ/L (ref 135–145)
SODIUM BLD-SCNC: 140 MEQ/L (ref 135–145)
SODIUM BLD-SCNC: 141 MEQ/L (ref 135–145)
SODIUM BLD-SCNC: 141 MEQ/L (ref 135–145)
WBC # BLD: 9.9 THOU/MM3 (ref 4.8–10.8)

## 2022-05-08 PROCEDURE — 99291 CRITICAL CARE FIRST HOUR: CPT | Performed by: INTERNAL MEDICINE

## 2022-05-08 PROCEDURE — 80048 BASIC METABOLIC PNL TOTAL CA: CPT

## 2022-05-08 PROCEDURE — 6360000002 HC RX W HCPCS: Performed by: STUDENT IN AN ORGANIZED HEALTH CARE EDUCATION/TRAINING PROGRAM

## 2022-05-08 PROCEDURE — 6360000002 HC RX W HCPCS: Performed by: NURSE PRACTITIONER

## 2022-05-08 PROCEDURE — 84295 ASSAY OF SERUM SODIUM: CPT

## 2022-05-08 PROCEDURE — 2000000000 HC ICU R&B

## 2022-05-08 PROCEDURE — 6370000000 HC RX 637 (ALT 250 FOR IP): Performed by: NURSE PRACTITIONER

## 2022-05-08 PROCEDURE — 85384 FIBRINOGEN ACTIVITY: CPT

## 2022-05-08 PROCEDURE — 6370000000 HC RX 637 (ALT 250 FOR IP): Performed by: INTERNAL MEDICINE

## 2022-05-08 PROCEDURE — 2500000003 HC RX 250 WO HCPCS: Performed by: NURSE PRACTITIONER

## 2022-05-08 PROCEDURE — APPSS30 APP SPLIT SHARED TIME 16-30 MINUTES: Performed by: PHYSICIAN ASSISTANT

## 2022-05-08 PROCEDURE — 99024 POSTOP FOLLOW-UP VISIT: CPT | Performed by: NEUROLOGICAL SURGERY

## 2022-05-08 PROCEDURE — 85576 BLOOD PLATELET AGGREGATION: CPT

## 2022-05-08 PROCEDURE — 94761 N-INVAS EAR/PLS OXIMETRY MLT: CPT

## 2022-05-08 PROCEDURE — 85027 COMPLETE CBC AUTOMATED: CPT

## 2022-05-08 PROCEDURE — 2580000003 HC RX 258: Performed by: NURSE PRACTITIONER

## 2022-05-08 PROCEDURE — 85347 COAGULATION TIME ACTIVATED: CPT

## 2022-05-08 RX ORDER — DEXMEDETOMIDINE HYDROCHLORIDE 4 UG/ML
.1-1.5 INJECTION, SOLUTION INTRAVENOUS CONTINUOUS
Status: DISCONTINUED | OUTPATIENT
Start: 2022-05-08 | End: 2022-05-10

## 2022-05-08 RX ORDER — ALPRAZOLAM 0.25 MG/1
0.25 TABLET ORAL 2 TIMES DAILY PRN
Status: DISCONTINUED | OUTPATIENT
Start: 2022-05-08 | End: 2022-05-12

## 2022-05-08 RX ORDER — ISOSORBIDE MONONITRATE 30 MG/1
30 TABLET, EXTENDED RELEASE ORAL DAILY
Status: DISCONTINUED | OUTPATIENT
Start: 2022-05-08 | End: 2022-05-19 | Stop reason: HOSPADM

## 2022-05-08 RX ADMIN — DEXAMETHASONE 4 MG: 4 TABLET ORAL at 17:49

## 2022-05-08 RX ADMIN — ALPRAZOLAM 0.25 MG: 0.25 TABLET ORAL at 20:57

## 2022-05-08 RX ADMIN — DEXAMETHASONE 4 MG: 4 TABLET ORAL at 06:01

## 2022-05-08 RX ADMIN — SERTRALINE 50 MG: 50 TABLET, FILM COATED ORAL at 20:58

## 2022-05-08 RX ADMIN — DEXAMETHASONE 4 MG: 4 TABLET ORAL at 23:13

## 2022-05-08 RX ADMIN — DOCUSATE SODIUM 100 MG: 100 CAPSULE, LIQUID FILLED ORAL at 20:57

## 2022-05-08 RX ADMIN — ISOSORBIDE MONONITRATE 30 MG: 30 TABLET, EXTENDED RELEASE ORAL at 06:01

## 2022-05-08 RX ADMIN — SODIUM CHLORIDE 30 ML/HR: 3 INJECTION, SOLUTION INTRAVENOUS at 17:49

## 2022-05-08 RX ADMIN — SERTRALINE 50 MG: 50 TABLET, FILM COATED ORAL at 08:02

## 2022-05-08 RX ADMIN — ENOXAPARIN SODIUM 40 MG: 100 INJECTION SUBCUTANEOUS at 08:02

## 2022-05-08 RX ADMIN — LEVOTHYROXINE SODIUM 100 MCG: 0.1 TABLET ORAL at 08:02

## 2022-05-08 RX ADMIN — LEVETIRACETAM 500 MG: 100 INJECTION, SOLUTION INTRAVENOUS at 13:41

## 2022-05-08 RX ADMIN — DEXAMETHASONE 4 MG: 4 TABLET ORAL at 12:07

## 2022-05-08 RX ADMIN — DOCUSATE SODIUM 100 MG: 100 CAPSULE, LIQUID FILLED ORAL at 08:02

## 2022-05-08 RX ADMIN — LEVETIRACETAM 500 MG: 100 INJECTION, SOLUTION INTRAVENOUS at 02:22

## 2022-05-08 RX ADMIN — PANTOPRAZOLE SODIUM 40 MG: 40 TABLET, DELAYED RELEASE ORAL at 08:02

## 2022-05-08 RX ADMIN — DEXMEDETOMIDINE HYDROCHLORIDE 0.2 MCG/KG/HR: 4 INJECTION, SOLUTION INTRAVENOUS at 22:38

## 2022-05-08 RX ADMIN — Medication 3 MG: at 21:53

## 2022-05-08 RX ADMIN — ATORVASTATIN CALCIUM 10 MG: 10 TABLET, FILM COATED ORAL at 20:57

## 2022-05-08 ASSESSMENT — ENCOUNTER SYMPTOMS
WHEEZING: 0
COLOR CHANGE: 0
VOMITING: 0
SORE THROAT: 0
PHOTOPHOBIA: 0
CHEST TIGHTNESS: 0
TROUBLE SWALLOWING: 0
NAUSEA: 0
ABDOMINAL PAIN: 0
SHORTNESS OF BREATH: 0
BACK PAIN: 0

## 2022-05-08 ASSESSMENT — PAIN SCALES - GENERAL
PAINLEVEL_OUTOF10: 5
PAINLEVEL_OUTOF10: 0
PAINLEVEL_OUTOF10: 0

## 2022-05-08 ASSESSMENT — PAIN DESCRIPTION - LOCATION: LOCATION: ABDOMEN

## 2022-05-08 ASSESSMENT — PAIN DESCRIPTION - ORIENTATION: ORIENTATION: LOWER

## 2022-05-08 ASSESSMENT — PAIN DESCRIPTION - DESCRIPTORS: DESCRIPTORS: ACHING

## 2022-05-08 NOTE — PLAN OF CARE
Problem: Discharge Planning  Goal: Discharge to home or other facility with appropriate resources  Description: Discharge planning in progress. Outcome: Progressing     Problem: Safety - Adult  Goal: Free from fall injury  Description: Family at bedside. Bed alarm activated. Outcome: Progressing  Note: Patient did not sustain any fall this shift     Problem: Chronic Conditions and Co-morbidities  Goal: Patient's chronic conditions and co-morbidity symptoms are monitored and maintained or improved  Description: Monitoring co-morbidities. Outcome: Progressing  Flowsheets (Taken 5/8/2022 0627)  Care Plan - Patient's Chronic Conditions and Co-Morbidity Symptoms are Monitored and Maintained or Improved:   Collaborate with multidisciplinary team to address chronic and comorbid conditions and prevent exacerbation or deterioration   Monitor and assess patient's chronic conditions and comorbid symptoms for stability, deterioration, or improvement   Update acute care plan with appropriate goals if chronic or comorbid symptoms are exacerbated and prevent overall improvement and discharge     Problem: Nutrition Deficit:  Goal: Optimize nutritional status  Description: NPO at this time. Monitoring for nutritional needs. Outcome: Progressing     Problem: ABCDS Injury Assessment  Goal: Absence of physical injury  Description: No physical injury noted. Outcome: Progressing  Flowsheets (Taken 5/8/2022 0627)  Absence of Physical Injury: Implement safety measures based on patient assessment  Note: Patient stayed void of physical injury      Problem: Skin/Tissue Integrity  Goal: Absence of new skin breakdown  Description: 1. Monitor for areas of redness and/or skin breakdown  2. Assess vascular access sites hourly  3. Every 4-6 hours minimum:  Change oxygen saturation probe site  4.   Every 4-6 hours:  If on nasal continuous positive airway pressure, respiratory therapy assess nares and determine need for appliance change or resting period. Outcome: Progressing  Note: Skin remained intact, no bruises or any opening noted on skin this shift.

## 2022-05-08 NOTE — PROGRESS NOTES
0825: Patient A&Ox4 but extremely impulsive this morning, patient has been up into chair (attempting to get out of chair without assistance multiple times) and back into bed, tele sitter at bedside

## 2022-05-08 NOTE — PROGRESS NOTES
Addendum by Dr. Raheem Zamora MD:  I have seen and examined the patient independently. Face to face evaluation and examination were performed. The below evaluation and note have been reviewed. Labs and radiographs were reviewed. I Have discussed with Neurosurgery PA about this patient in detail. Time spent with patient 35 minutes. Time could have been discontiguous. Time does not include procedures. Time does include my direct assessment of the patient and coordination of care. 100% decision making by myself. Time represents more than 50% of the time involved with patient care by the neurosurgical team  I agree with below assessment and plan. Please see my modifications mentioned below. My additional comments and modifications:     · No acute event over the night. ·        There is no significant change in patient symptoms and neurological exam compared with yesterday. Patient's recommendation and treatment plan from neurosurgical perspective at this time:     · Continue medical management per ICU team.  · Seizure precaution (Keppra 500 mg every 12h). · Dexamethasone 4 mg every 6 IV. · Continue the hypertonic 3% saline with the main target of sodium [id number]. · I discussed the case in detail with ICU team.  · Today I had another long discussion with patient regarding her options from neurosurgical perspective at this time. I told the patient that if she fits for surgery from medical stand point that I would recommend for the patient and surgical intervention in the form of right sided craniotomy for maximum resection of patient right-sided recurrent metastatic brain lesion. · This recommended intervention was discussed and patient in detail including the associated risks and benefits and the realistic expectation. This discussion was carried out in front of neuro surgery PA(Alen Crocker) and ICU nurse practitioner Jeny Petersen). All questions and concern were addressed and answered. Patient stated that she would like to discuss with her family first before she makes her final decision. I was informed later per ICU nurse practitioner Carole Cabrera) that the family discussed the case with Mary Hanson over the phone and they stated that they would like to proceed with the surgical intervention. · The plan for patient to schedule her for surgery this week based on the OR availability. · Medical and cardiology clearance for surgical intervention. · Neurosurgery will follow. Judy Dunn MD       Neurosurgery Progress Note    Patient:  Eamon Hobbs      Unit/Bed:4D-08/008-A    YOB: 1933    MRN: [id number]     Acct: [de-identified]     Admit date: 5/6/2022    Chief Complaint   Patient presents with    Abdominal Pain       Patient Seen, Chart, Physician notes, Labs, Radiology studies reviewed. Subjective:         Past, Family, Social History unchanged from admission. Diet:  ADULT DIET; Regular    Medications:  Scheduled Meds:   isosorbide mononitrate  30 mg Oral Daily    docusate sodium  100 mg Oral BID    levothyroxine  100 mcg Oral Daily    pantoprazole  40 mg Oral QAM AC    [Held by provider] aspirin  81 mg Oral Daily    sertraline  50 mg Oral BID    atorvastatin  10 mg Oral Nightly    dexamethasone  4 mg Oral 4 times per day    levetiracetam  500 mg IntraVENous Q12H    enoxaparin  40 mg SubCUTAneous Daily     Continuous Infusions:   sodium chloride 30 mL/hr (05/08/22 0500)     PRN Meds:ALPRAZolam, melatonin, polyethylene glycol, ondansetron **OR** ondansetron    Objective:    Vitals: BP (!) 126/101   Pulse 64   Temp 98.5 °F (36.9 °C)   Resp 16   Ht 5' (1.524 m)   Wt 144 lb 6.4 oz (65.5 kg)   LMP  (LMP Unknown) Comment: age 52 ovaries intact  SpO2 95%   BMI 28.20 kg/m²     Physical Exam     No appreciable change from prior evaluation. Patient awake alert and oriented x3  GCS: 15/15   Pupils: equal and reactive. 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   She has significant decreased vision in the left (chronic old findings). FCx4 with good strength through out   Sensory: grossly intact  Reflexes: 2+ through out. Planter reflex: Down response    Physical Exam:  Alert and attentive. Language appropriate, with no aphasia. Pupils equal.  Facial strength symmetric. Review of Systems     Constitutional: Positive for fatigue. HENT: Negative for congestion. Eyes: Positive for visual disturbance. Respiratory: Negative for chest tightness. Cardiovascular: Negative for chest pain. Gastrointestinal: Positive for nausea and vomiting. Genitourinary: Negative for difficulty urinating. Musculoskeletal: Negative for gait problem. Skin: Negative for color change. Neurological: Positive for dizziness, numbness and headaches. Psychiatric/Behavioral: Negative for confusion.        24 hour intake/output:    Intake/Output Summary (Last 24 hours) at 5/8/2022 1226  Last data filed at 5/8/2022 1113  Gross per 24 hour   Intake 1051.44 ml   Output 3310 ml   Net -2258.56 ml     Last 3 weights: Wt Readings from Last 3 Encounters:   05/08/22 144 lb 6.4 oz (65.5 kg)   05/06/22 134 lb (60.8 kg)   01/22/22 134 lb (60.8 kg)         CBC:   Recent Labs     05/06/22  1921 05/07/22  1450 05/08/22  0703   WBC 7.4 6.0 9.9   HGB 13.0 12.1 12.3    142 152     BMP:    Recent Labs     05/06/22  1921 05/07/22  0023 05/07/22  0554 05/07/22  0820 05/08/22  0344 05/08/22  0703 05/08/22  1135      < > 138   < > 138 141 141   K 3.8  --  4.4  --  3.9  --   --      --  104  --  104  --   --    CO2 24  --  23  --  26  --   --    BUN 20  --  18  --  19  --   --    CREATININE 0.7  --  0.6  --  0.7  --   --    GLUCOSE 129*  --  131*  --  145*  --   --     < > = values in this interval not displayed. Calcium:  Recent Labs     05/08/22  0344   CALCIUM 8.1*     Magnesium:  Recent Labs     05/07/22  0554   MG 1.7     Glucose:No results for input(s): POCGLU in the last 72 hours.   HgbA1C: No results for input(s): LABA1C in the last 72 hours. Lipids: No results for input(s): CHOL, TRIG, HDL, LDL, LDLCALC in the last 72 hours. Radiology reports as per the Radiologist  Radiology: CT Head WO Contrast    Result Date: 5/6/2022  CT head without contrast Comparison: 10/29/2021 10:19 AM EDT Findings: There has been prior right temporoparietal craniotomy. There is new edema right temporal lobe and occipital lobe. This extends into the posterior right parietal lobe as well. There appear to be underlying cystic lesions in this region. Largest measures 2 cm, not well seen on noncontrasted CT. Follow-up contrast enhanced MRI recommended to assess for recurrent mass No acute intracranial hemorrhage is demonstrated. No acute bony abnormalities. Sinuses and mastoids clear. Right ocular prosthesis. Impression: New edema right temporal, occipital and parietal lobes. There appear to be underlying cystic masses, 1 of which measures 2 cm. These are not well assessed, recommend follow-up contrast-enhanced MRI. This document has been electronically signed by: Andrea العراقي MD on 05/06/2022 09:06 PM All CTs at this facility use dose modulation techniques and iterative reconstructions, and/or weight-based dosing when appropriate to reduce radiation to a low as reasonably achievable. XR CHEST PORTABLE    Result Date: 5/7/2022  EXAM: CHEST X-RAY, SINGLE VIEW PORTABLE: COMPARISON:  [facility] - XR CHEST PORTABLE - 05/07/2022 01:01 AM EDT FINDINGS: Right internal jugular central line has been placed, tip is at the cavoatrial junction. Heart size remains within normal limits. Central vascular congestion is again suspected. Question of new infiltrate or pneumonitis in the right lung base, not present on the earlier study. 1. Unremarkable central line placement. 2. Question of new infiltrate or pneumonitis in right lung base.  This document has been electronically signed by: Kevin Nunez MD on 05/07/2022 06:10 AM    XR CHEST PORTABLE    Result Date: 5/7/2022  1 view chest x-ray. Comparison: 1/23/2022, 6/3/2019 Findings: Lungs are clear without acute infiltrates. Stable prominence right medial apex. Suspect this is vascular prominence. No pneumothorax. Heart size normal. No acute bony abnormalities. Impression: No acute processes. This document has been electronically signed by: Marcos Askew MD on 05/07/2022 04:25 AM    MRI BRAIN W WO CONTRAST    Result Date: 5/6/2022  MR brain without and with contrast Comparison: CT same date, MRI 10/21/2021 Findings: Multilocular cystic enhancing lesion right parieto-occipital lobes. Finding appears to represent a single large lesion rather than multiple. Overall measurements somewhat difficult due to the irregular configuration. Findings approximately 2.7 cm transverse by 4.5 cm AP by 4 cm height. There is overlying thickening and enhancement of the meninges. Possibilities include recurrent malignancy versus abscess. There is surrounding edema right temporal, parietal and occipital lobes. No other abnormal areas of contrast enhancement demonstrated. Nonspecific dural thickening and enhancement anterior left lobe. Prior study images obscure the lesion with measurement calipers. Finding is grossly unchanged from this previous study. No acute signal abnormalities on diffusion-weighted imaging. Chronic ischemic white matter disease noted with mild volume loss. Midline structures are intact and within normal limits. Normal flow voids are noted within the vascular structures. Sinuses and mastoids are clear. Right ocular prosthesis. Impression: Large irregular enhancing cystic mass on the right with surrounding edema. Primary considerations would be recurrent malignancy versus abscess Persistent linear enhancement along the meninges anterior left parietal lobe.  This document has been electronically signed by: Marcos Askew MD on 05/06/2022 11:54 PM Assessment: Large irregular enhancing cystic mass in the right with surrounding edema concerning for recurrent malignancy versus abscess    Principal Problem:    Brain mass  Active Problems:    Brain metastases ([address]  Resolved Problems:    * No resolved hospital problems. *        Plan: Patient is seen and evaluated in the ICU setting with evaluation exam findings reviewed discussed with Dr. Makenna Morales and with nursing. Patient is lying in bed with head of the bed mildly elevated and is comfortable with pain well-controlled. She demonstrates clear appropriate speech and participated in discussions with Dr. Areli Villegas involving her care, appropriately. On exam she remains otherwise stable and intact to her baseline on admission with improvement noted for prior headache and nausea with vomiting. Neurosurgery is considering the patient for additional surgical intervention in the form of a reexploration of her prior craniotomy for resection of recurrent mass. Prior to consent and scheduling, necessary clearances will be obtained and additional discussions will be had with the patient and patient's family regarding this and other options moving forward.   Neurosurgery to follow      Electronically signed by Sravanthi Howell PA-C on 5/8/2022 at 12:26 PM    Neurosurgery

## 2022-05-08 NOTE — SIGNIFICANT EVENT
With the patient's brother Valeria Prabhakar via telephone. I did explain the situation of her current brain mass. He states he would like patient to undergo surgery. I did explain that I have spoken to patient extensively with Dr. Dain Farrell and she does agree to surgery. I feel that she is mentally intact to make decision but would like family's input also secondary to her her diagnosis. Daniel Peña states that he would like Dr. Kemar Ribeiro to perform surgery as he is operating held her before. He states that him and his daughter plan to arrive to the hospital by Tuesday from [place]. All questions were answered. I did ask extensively discuss risk and benefit which did include new weakness, bleeding, need for additional surgical intervention, infection. He did understand these risks. He did again state that he feels this is the best option and again stated that he would like Dr. Kemar Ribeiro to complete surgery. All questions were answered. I did update primary nurse. I will update Dr. Kemar Ribeiro. Electronically signed by Carmelita Romero CNP on 5/8/2022 at 2:47 PM

## 2022-05-08 NOTE — PROGRESS NOTES
CRITICAL CARE PROGRESS NOTE      Patient:  Mikhail Hillman    Unit/Bed:4D-08/008-A  YOB: 1933  MRN: [id number]   PCP: Prashant Bazzi MD  Date of Admission: 5/6/2022  Chief Complaint:- headache    Assessment and Plan:      1. Acute hypoxic respiratory failure: Likely secondary to atelectasis. Mild concern on chest x-ray for new infiltrate. Patient has no fever no leukocytosis no cough no sputum production. Increase activity and up to chair. Incentive spirometry  2. Right-sided brain masses: Noted CT head. MRI shows mass with cerebral edema. Neurosurgery consulted. Patient does have history of cancer with metastatic disease. Maintain systolic blood pressure 300-567. Neurochecks. Patient was on chronic aspirin. This was given yesterday, now on hold. TEG and platelet mapping pending. 3. Cerebral edema: 3% saline and Decadron. Goal sodium 145-150, seizure precautions. 4. History of breast cancer: With mets to the liver. Diagnosed in 2016. Completed chemotherapy. She follows with Dr. Naima Shaw as outpatient. 5. History of temporal arteritis: Noted history. On Decadron. Prostatic right eye, secondary to vision loss  6. Hypertension: Continue Imdur  7. Hyperlipidemia: Lipitor  8. GERD: PPI  9. Hypothyroidism: Continue Synthroid  10. Anxiety: Zoloft    INITIAL H AND P AND ICU COURSE:  Mikhail Hillman is an 22-year-old white female who presented to [facility] on 5/6/2022 for lower extremity numbness headaches and nausea and vomiting. 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   Per report patient presents that for the last 2 weeks she had had progressive headache she reported 3-4 headaches a week she is not [place] difficulty sleeping she stated that she has had nausea and vomiting that started a few days prior. She endorsed feeling fatigued. She denied any dysuria or urgency but did states she had decreased urine output. In the emergency department labs were normal.  Troponin was negative. EKG was completed which was negative. She underwent CT head which showed cerebral edema with a right temporal occipital parietal mass. Consult to neurosurgery Dr. Dianne Nevarez. He recommended admission to the ICU for close neurological monitoring. Patient was started on Decadron and hypertonic saline. MRI brain was ordered. Patient was admitted to ICU in stable condition. Past Medical History: per HPI  Family History: Sister: Breast cancer Brother: Lung cancer, colon cancer  Social History: Reformed smoker, denies alcohol use, denies drug use. Review of Systems   Constitutional: Negative for fatigue and fever. HENT: Negative for sore throat and trouble swallowing. Eyes: Positive for visual disturbance (chronic right eye blindness). Negative for photophobia. Respiratory: Negative for chest tightness, shortness of breath and wheezing. Cardiovascular: Negative for chest pain and leg swelling. Gastrointestinal: Negative for abdominal pain, nausea and vomiting. Endocrine: Negative for polydipsia and polyphagia. Genitourinary: Negative for decreased urine volume and flank pain. Musculoskeletal: Negative for back pain and neck pain. Skin: Negative for color change, pallor and rash. Allergic/Immunologic: Negative for food allergies. Neurological: Positive for light-headedness and headaches. Negative for dizziness, weakness and numbness. Hematological: Negative for adenopathy. Psychiatric/Behavioral: Positive for confusion. Negative for agitation. The patient is not nervous/anxious.         Scheduled Meds:   isosorbide mononitrate  30 mg Oral Daily    docusate sodium  100 mg Oral BID    levothyroxine  100 mcg Oral Daily    pantoprazole  40 mg Oral QAM AC    aspirin  81 mg Oral Daily    sertraline  50 mg Oral BID    atorvastatin  10 mg Oral Nightly    dexamethasone  4 mg Oral 4 times per day    levetiracetam  500 mg IntraVENous Q12H    enoxaparin  40 mg SubCUTAneous Daily     Continuous Infusions:   sodium chloride 30 mL/hr (05/08/22 0500)       PHYSICAL EXAMINATION:  T:  98.3.  P:  67. RR:  19. B/P:  160/67. FiO2:  2. O2 Sat:  96.  I/O:  1051/2160  Body mass index is 27.64 kg/m². GCS: 15  General: Acute on chronically ill-appearing white female  HEENT:  normocephalic and atraumatic. No scleral icterus. PERR  Neck: supple. No Thyromegaly. Lungs: clear to auscultation. No retractions  Cardiac: RRR. No JVD. Abdomen: soft. Nontender. Extremities:  No clubbing, cyanosis, or edema x 4. Vasculature: capillary refill < 3 seconds. Palpable dorsalis pedis pulses. Skin:  warm and dry. Psych:  Alert and oriented x3. Intermittent confusion  Lymph:  No supraclavicular adenopathy. Neurologic:  No focal deficit. No seizures. Data: (All radiographs, tracings, PFTs, and imaging are personally viewed and interpreted unless otherwise noted).  Sodium 138, potassium 3.9, chloride 104, CO2 26, BUN 19, creatinine 0.7, anion gap 8.0, glucose 145.  WBC 6.0, hemoglobin 12.1, hematocrit 38.7, platelet count 818   Telemetry shows normal sinus rhythm   CT head 5/6/2022 reports new right temporal edema. Cystic mass.  Chest x-ray 5/7/2022 reports questionable new infiltrate or pneumonitis in the right lung base.  MRI 5/6/2022 reports large irregular enhancing cystic mass on the right with surrounding edema. Considerations would be malignancy versus abscess.  EKG 5/6/2022 reports normal sinus rhythm   Echocardiogram 8/17/2021 reports normal EF 65%, normal LV function.     Meets Continued ICU Level Care Criteria:    [x] Yes   [] No - Transfer Planned to listed location:  [] HOSPITALIST CONTACTED-      Case and plan discussed with Dr. Thuy Levine and Dr. Lars Quinones. Electronically signed by Madeleine Cook.  PAMELA Sparrow - CNP  CRITICAL CARE SPECIALIST

## 2022-05-09 ENCOUNTER — ANESTHESIA EVENT (OUTPATIENT)
Dept: OPERATING ROOM | Age: 87
DRG: 025 | End: 2022-05-09
Payer: MEDICARE

## 2022-05-09 ENCOUNTER — HOSPITAL ENCOUNTER (OUTPATIENT)
Dept: RADIATION ONCOLOGY | Age: 87
Discharge: HOME | End: 2022-05-09

## 2022-05-09 LAB
ANION GAP SERPL CALCULATED.3IONS-SCNC: 9 MEQ/L (ref 8–16)
BUN BLDV-MCNC: 19 MG/DL (ref 7–22)
CALCIUM SERPL-MCNC: 7.7 MG/DL (ref 8.5–10.5)
CHLORIDE BLD-SCNC: 107 MEQ/L (ref 98–111)
CO2: 25 MEQ/L (ref 23–33)
CREAT SERPL-MCNC: 0.5 MG/DL (ref 0.4–1.2)
ERYTHROCYTE [DISTWIDTH] IN BLOOD BY AUTOMATED COUNT: 13.7 % (ref 11.5–14.5)
ERYTHROCYTE [DISTWIDTH] IN BLOOD BY AUTOMATED COUNT: 46.5 FL (ref 35–45)
GFR SERPL CREATININE-BSD FRML MDRD: > 90 ML/MIN/1.73M2
GLUCOSE BLD-MCNC: 141 MG/DL (ref 70–108)
HCT VFR BLD CALC: 40.8 % (ref 37–47)
HEMOGLOBIN: 12.7 GM/DL (ref 12–16)
MCH RBC QN AUTO: 29 PG (ref 26–33)
MCHC RBC AUTO-ENTMCNC: 31.1 GM/DL (ref 32.2–35.5)
MCV RBC AUTO: 93.2 FL (ref 81–99)
ORGANISM: ABNORMAL
PLATELET # BLD: 125 THOU/MM3 (ref 130–400)
PMV BLD AUTO: 12.2 FL (ref 9.4–12.4)
POTASSIUM SERPL-SCNC: 4.1 MEQ/L (ref 3.5–5.2)
RBC # BLD: 4.38 MILL/MM3 (ref 4.2–5.4)
SODIUM BLD-SCNC: 140 MEQ/L (ref 135–145)
SODIUM BLD-SCNC: 141 MEQ/L (ref 135–145)
SODIUM BLD-SCNC: 143 MEQ/L (ref 135–145)
URINE CULTURE, ROUTINE: ABNORMAL
URINE CULTURE, ROUTINE: NORMAL
WBC # BLD: 6.3 THOU/MM3 (ref 4.8–10.8)

## 2022-05-09 PROCEDURE — 6360000002 HC RX W HCPCS: Performed by: NURSE PRACTITIONER

## 2022-05-09 PROCEDURE — 6360000002 HC RX W HCPCS: Performed by: STUDENT IN AN ORGANIZED HEALTH CARE EDUCATION/TRAINING PROGRAM

## 2022-05-09 PROCEDURE — 6360000002 HC RX W HCPCS: Performed by: INTERNAL MEDICINE

## 2022-05-09 PROCEDURE — 99291 CRITICAL CARE FIRST HOUR: CPT | Performed by: INTERNAL MEDICINE

## 2022-05-09 PROCEDURE — 6370000000 HC RX 637 (ALT 250 FOR IP): Performed by: INTERNAL MEDICINE

## 2022-05-09 PROCEDURE — 2000000000 HC ICU R&B

## 2022-05-09 PROCEDURE — APPSS30 APP SPLIT SHARED TIME 16-30 MINUTES

## 2022-05-09 PROCEDURE — 6370000000 HC RX 637 (ALT 250 FOR IP): Performed by: NURSE PRACTITIONER

## 2022-05-09 PROCEDURE — 84295 ASSAY OF SERUM SODIUM: CPT

## 2022-05-09 PROCEDURE — 2500000003 HC RX 250 WO HCPCS: Performed by: NURSE PRACTITIONER

## 2022-05-09 PROCEDURE — 99233 SBSQ HOSP IP/OBS HIGH 50: CPT | Performed by: NEUROLOGICAL SURGERY

## 2022-05-09 PROCEDURE — 94761 N-INVAS EAR/PLS OXIMETRY MLT: CPT

## 2022-05-09 PROCEDURE — 99221 1ST HOSP IP/OBS SF/LOW 40: CPT | Performed by: RADIOLOGY

## 2022-05-09 PROCEDURE — 2700000000 HC OXYGEN THERAPY PER DAY

## 2022-05-09 PROCEDURE — 2500000003 HC RX 250 WO HCPCS: Performed by: INTERNAL MEDICINE

## 2022-05-09 PROCEDURE — 80048 BASIC METABOLIC PNL TOTAL CA: CPT

## 2022-05-09 PROCEDURE — 2580000003 HC RX 258: Performed by: NURSE PRACTITIONER

## 2022-05-09 PROCEDURE — 85027 COMPLETE CBC AUTOMATED: CPT

## 2022-05-09 RX ORDER — HYDRALAZINE HYDROCHLORIDE 20 MG/ML
5 INJECTION INTRAMUSCULAR; INTRAVENOUS EVERY 6 HOURS PRN
Status: DISCONTINUED | OUTPATIENT
Start: 2022-05-09 | End: 2022-05-11

## 2022-05-09 RX ORDER — ACETAMINOPHEN 325 MG/1
650 TABLET ORAL EVERY 4 HOURS PRN
Status: DISCONTINUED | OUTPATIENT
Start: 2022-05-09 | End: 2022-05-12

## 2022-05-09 RX ORDER — LEVETIRACETAM 500 MG/1
500 TABLET ORAL 2 TIMES DAILY
Status: DISCONTINUED | OUTPATIENT
Start: 2022-05-09 | End: 2022-05-19 | Stop reason: HOSPADM

## 2022-05-09 RX ORDER — 3% SODIUM CHLORIDE 3 G/100ML
35 INJECTION, SOLUTION INTRAVENOUS CONTINUOUS
Status: DISCONTINUED | OUTPATIENT
Start: 2022-05-09 | End: 2022-05-10

## 2022-05-09 RX ADMIN — LEVETIRACETAM 500 MG: 100 INJECTION, SOLUTION INTRAVENOUS at 12:04

## 2022-05-09 RX ADMIN — Medication 3 MG: at 21:02

## 2022-05-09 RX ADMIN — ATORVASTATIN CALCIUM 10 MG: 10 TABLET, FILM COATED ORAL at 21:02

## 2022-05-09 RX ADMIN — PANTOPRAZOLE SODIUM 40 MG: 40 TABLET, DELAYED RELEASE ORAL at 06:43

## 2022-05-09 RX ADMIN — DEXMEDETOMIDINE HYDROCHLORIDE 0.6 MCG/KG/HR: 4 INJECTION, SOLUTION INTRAVENOUS at 04:32

## 2022-05-09 RX ADMIN — HYDRALAZINE HYDROCHLORIDE 5 MG: 20 INJECTION, SOLUTION INTRAMUSCULAR; INTRAVENOUS at 01:54

## 2022-05-09 RX ADMIN — ISOSORBIDE MONONITRATE 30 MG: 30 TABLET, EXTENDED RELEASE ORAL at 08:39

## 2022-05-09 RX ADMIN — ALPRAZOLAM 0.25 MG: 0.25 TABLET ORAL at 21:04

## 2022-05-09 RX ADMIN — SERTRALINE 50 MG: 50 TABLET, FILM COATED ORAL at 23:03

## 2022-05-09 RX ADMIN — LEVETIRACETAM 500 MG: 100 INJECTION, SOLUTION INTRAVENOUS at 01:18

## 2022-05-09 RX ADMIN — SODIUM CHLORIDE 35 ML/HR: 3 INJECTION, SOLUTION INTRAVENOUS at 10:18

## 2022-05-09 RX ADMIN — DEXAMETHASONE 4 MG: 4 TABLET ORAL at 19:58

## 2022-05-09 RX ADMIN — SERTRALINE 50 MG: 50 TABLET, FILM COATED ORAL at 08:39

## 2022-05-09 RX ADMIN — ENOXAPARIN SODIUM 40 MG: 100 INJECTION SUBCUTANEOUS at 08:39

## 2022-05-09 RX ADMIN — DEXAMETHASONE 4 MG: 4 TABLET ORAL at 06:42

## 2022-05-09 RX ADMIN — LEVOTHYROXINE SODIUM 100 MCG: 0.1 TABLET ORAL at 06:42

## 2022-05-09 RX ADMIN — DOCUSATE SODIUM 100 MG: 100 CAPSULE, LIQUID FILLED ORAL at 21:02

## 2022-05-09 RX ADMIN — DEXAMETHASONE 4 MG: 4 TABLET ORAL at 12:04

## 2022-05-09 RX ADMIN — LEVETIRACETAM 500 MG: 500 TABLET, FILM COATED ORAL at 23:03

## 2022-05-09 RX ADMIN — DOCUSATE SODIUM 100 MG: 100 CAPSULE, LIQUID FILLED ORAL at 08:39

## 2022-05-09 RX ADMIN — CEFTRIAXONE SODIUM 1000 MG: 10 INJECTION, POWDER, FOR SOLUTION INTRAVENOUS at 17:24

## 2022-05-09 ASSESSMENT — ENCOUNTER SYMPTOMS
SHORTNESS OF BREATH: 0
WHEEZING: 0
PHOTOPHOBIA: 0
CONSTIPATION: 0
VOMITING: 0
NAUSEA: 0
CHEST TIGHTNESS: 0
ABDOMINAL PAIN: 0
COUGH: 0
COLOR CHANGE: 0
TROUBLE SWALLOWING: 0
BACK PAIN: 0
SORE THROAT: 0

## 2022-05-09 NOTE — PROGRESS NOTES
CRITICAL CARE PROGRESS NOTE      Patient:  Henry Robertson    Unit/Bed:4D-08/008-A  YOB: 1933  MRN: [id number]   PCP: Emily Ghotra MD  Date of Admission: 5/6/2022  Chief Complaint:- headache     Assessment and Plan:       1. Acute hypoxic respiratory failure: Likely secondary to atelectasis. Mild concern on chest x-ray for new infiltrate. Patient has no fever no leukocytosis no cough no sputum production. Increase activity and up to chair. Incentive spirometry  2. Right-sided brain masses: Noted CT head. MRI shows mass with cerebral edema. Neurosurgery consulted. Patient does have history of cancer with metastatic disease. Maintain systolic blood pressure 119-091. Neurochecks. Patient was on chronic aspirin. This was given yesterday, now on hold. TEG and platelet mapping normal  3. Cerebral edema: 3% saline and Decadron. Goal sodium 145-150, seizure precautions. 4. History of breast cancer: With mets to the liver. Diagnosed in 2016. Completed chemotherapy. She follows with Dr. Ximena Urbina as outpatient. 5. History of temporal arteritis: Noted history. On Decadron. Prostatic right eye, secondary to vision loss  6. Hypertension: Continue Imdur  7. Hyperlipidemia: Lipitor  8. Thrombocytopenia: Platelets 237, monitor. No signs of bleeding. 9. GERD: PPI  10. Hypothyroidism: Continue Synthroid  11. Anxiety: Zoloft     INITIAL H AND P AND ICU COURSE:  Henry Robertson is an 66-year-old white female who presented to [facility] on 5/6/2022 for lower extremity numbness headaches and nausea and vomiting. 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   Per report patient presents that for the last 2 weeks she had had progressive headache she reported 3-4 headaches a week she is not [place] difficulty sleeping she stated that she has had nausea and vomiting that started a few days prior. She endorsed feeling fatigued. She denied any dysuria or urgency but did states she had decreased urine output. In the emergency department labs were normal.  Troponin was negative. EKG was completed which was negative. She underwent CT head which showed cerebral edema with a right temporal occipital parietal mass. Consult to neurosurgery Dr. Praneeth Carroll. He recommended admission to the ICU for close neurological monitoring. Patient was started on Decadron and hypertonic saline. MRI brain was ordered. Patient was admitted to ICU in stable condition.     Past Medical History: per HPI  Family History: Sister: Breast cancer Brother: Lung cancer, colon cancer  Social History: Reformed smoker, denies alcohol use, denies drug use. Review of Systems   Constitutional: Negative for fatigue and fever. HENT: Negative for sore throat and trouble swallowing. Eyes: Positive for visual disturbance (chronic). Negative for photophobia. Respiratory: Negative for chest tightness, shortness of breath and wheezing. Cardiovascular: Negative for chest pain and leg swelling. Gastrointestinal: Negative for abdominal pain, nausea and vomiting. Endocrine: Negative for polydipsia and polyphagia. Genitourinary: Negative for decreased urine volume and flank pain. Musculoskeletal: Negative for back pain and neck pain. Skin: Negative for color change, pallor and rash. Allergic/Immunologic: Negative for food allergies. Neurological: Negative for dizziness, weakness and numbness. Hematological: Negative for adenopathy. Psychiatric/Behavioral: Negative for agitation and confusion. The patient is nervous/anxious.         Scheduled Meds:   isosorbide mononitrate  30 mg Oral Daily    docusate sodium  100 mg Oral BID    levothyroxine  100 mcg Oral Daily    pantoprazole  40 mg Oral QAM AC    [Held by provider] aspirin  81 mg Oral Daily    sertraline  50 mg Oral BID    atorvastatin  10 mg Oral Nightly    dexamethasone  4 mg Oral 4 times per day    levetiracetam  500 mg IntraVENous Q12H    enoxaparin  40 mg SubCUTAneous Daily     Continuous Infusions:   sodium chloride 35 mL/hr (05/09/22 0124)    dexmedetomidine 0.6 mcg/kg/hr (05/09/22 0432)       PHYSICAL EXAMINATION:  T:  97.8. P:  55. RR:  19. B/P:  166/67. FiO2:  2. O2 Sat:  97.  I/O:  1200/  Body mass index is 27.47 kg/m². GCS: 15  General:   Acute on chronically ill-appearing white female  HEENT:  normocephalic and atraumatic. No scleral icterus. PERR  Neck: supple. No Thyromegaly. Lungs: clear to auscultation. No retractions  Cardiac: RRR. No JVD. Abdomen: soft. Nontender. Extremities:  No clubbing, cyanosis, or edema x 4. Vasculature: capillary refill < 3 seconds. Palpable dorsalis pedis pulses. Skin:  warm and dry. Psych:  Alert and oriented x3. Affect appropriate  Lymph:  No supraclavicular adenopathy. Neurologic:  No focal deficit. No seizures. Data: (All radiographs, tracings, PFTs, and imaging are personally viewed and interpreted unless otherwise noted).  Sodium 141, potassium 4.1, chloride 107, CO2 25, BUN 19, creatinine 0.5 demanding at 9.0, glucose 141.  WBC 6.3, hemoglobin 12.7, hematocrit 40.8, platelet count 102   Telemetry shows normal sinus rhythm  · CT head 5/6/2022 reports new right temporal edema. Cystic mass. · Chest x-ray 5/7/2022 reports questionable new infiltrate or pneumonitis in the right lung base. · MRI 5/6/2022 reports large irregular enhancing cystic mass on the right with surrounding edema. Considerations would be malignancy versus abscess. · EKG 5/6/2022 reports normal sinus rhythm  · Echocardiogram 8/17/2021 reports normal EF 65%, normal LV function.         Meets Continued ICU Level Care Criteria:    [x] Yes   [] No - Transfer Planned to listed location:  [] HOSPITALIST CONTACTED-      Case and plan discussed with Dr. Jillian Irwin and Dr. Jnaet Sandoval. Electronically signed by Tran Sanchez. PAMELA Ace - [facility]  CRITICAL CARE SPECIALIST  Patient seen by me including key components of medical care. Case discussed with nurse practitioner. Case cussed with Dr. Janet Sandoval. Patient on Precedex for slight agitation. On steroids and hypertonic saline for edema secondary to brain mass with subsequent edema. .  Italicized font, if present,  represents changes to the note made by me. CC time 35 minutes. Time was discontiguous. Time does not include procedure. Time does include my direct assessment of the patient and coordination of care. Time represents more than 50% of the time involved with patient care by the [address] [place] [address] team.  Electronically signed by Mary Rolle.  Jillian Irwin MD.

## 2022-05-09 NOTE — PROGRESS NOTES
Medical Oncology consult requested due to history of breast cancer with history of resected brain metastasis in July 2021 with new recurrent brain metastasis. The patient had previously followed with Dr. Odell Lawson. Patient's RN Guilherme Schmidt states the patient was to follow up with Dr. Norma Abraham as an outpatient with an appointment scheduled. Recommend Dr. Norma Abraham is consulted to follow admission. Discussed with ICU, Dr. Ervin Kim.   Electronically signed by   Chantell Herring PA-C on 5/9/2022 at 2:14 PM

## 2022-05-09 NOTE — PROGRESS NOTES
At 2038 Patient is very agitated, confused and is trying to get out of bed. Patient refused to stay clam despite the administration of scheduled Xanax 0.25mg. [facility] notified,     At 2059, patient kept trying to get out from the bed, stated \"I need to get on my pyjamas , I need to see my brother and . They both re hear to see me. \" DELON Edwards was in to see patient. patient agitation and confusion increasingly getting worse. This RN requested for sitter.

## 2022-05-09 NOTE — CARE COORDINATION
5/9/22, 11:54 AM EDT  DISCHARGE PLANNING EVALUATION:    Lynda Hull       Admitted: 5/6/2022/ [place] day: 3   Location: 4D-08/008-A Reason for admit: Brain edema ([address] [G93.6]  Brain metastases ([address] [C79.31]  Brain mass [G93.89]  Nausea and vomiting, intractability of vomiting not specified, unspecified vomiting type [R11.2]   PMH:  has a past medical history of Anxiety, Blind right eye, Breast cancer ([address] Breast cancer metastasized to liver (5/16) and brain (7/21) ([address] Carotid stenosis, Colostomy in place [facility]), Degenerative arthritis of cervical spine, GERD (gastroesophageal reflux disease), History of craniotomy, Hx antineoplastic chemo, Hypercholesteremia, Hypoestrogenism, Hypothyroidism, Lumbago, Lumbosacral spinal stenosis, MDRO (multiple drug resistant organisms) resistance, Metastasis ([address] Personal history of cardiac dysrhythmia, Respiratory tract infection due to COVID-19 virus, Sigmoid diverticulosis, Spondylolisthesis of lumbosacral region, Steroid-induced hyperglycemia, Subclavian artery stenosis ([address] Superior and Inferior Pubic ramus fracture, right, closed, initial encounter ([address] SVT (supraventricular tachycardia) ([address] Temporal arteritis ([address] Vertebral artery occlusion, and Vitamin D deficiency. Procedure: 5/6 CT Head: New edema right temporal, occipital and parietal lobes. There appear to be underlying cystic masses, 1 of which measures 2 cm. 5/6 MRI Brain: Large irregular enhancing cystic mass on the right with surrounding edema. Primary considerations would be recurrent malignancy versus abscess. Persistent linear enhancement along the meninges anterior left parietal   lobe. 5/7 CXR: Unremarkable central line placement. Question of new infiltrate or pneumonitis in right lung base  5/7 CVC placement  Barriers to Discharge:  Admit through ER on 5/6 for concern for new brain mass.   Multiple R side cystic brain masses with edema noted on imaging, hx R side tumor resection and debulking in 07/2021. Hx breast cancer with mets to liver. Neurosurgery following, planning craniotomy with resection of R lesion/mass. Consult to Oncology and Radiation Oncology. Agitation overnight, telesitter placed in room. PO Decadron, Precedex gtt 0.6mcg/kg/hr, IV Keppra 500 mg q 12hr, 3% infusion at 35 ml/hr. Temp 98.5-95.9, HR 40-50's, 2L O2. , q 4 hr monitoring while on hypertonic IVF. PCP: Kyler Schultz MD  Readmission Risk Score: 18.3 ( )%  Patient's Healthcare Decision Maker: Named in [address]  Patient Goals/Plan/Treatment Preferences: Spoke with Mango Lai and stanley Duenas. They report Mango Lai was living home alone. She was current with [facility] for her O2; she wears 2 L ATC. Has services through [address] [place] [address], Rn once a week, and aide services 5x week. She has a 2 wheeled walker, shower chair, and bedside commode. Transportation/Food Security/Housekeeping Addressed:  No issues identified.

## 2022-05-09 NOTE — PROGRESS NOTES
At 5067 patient artificial right eye was shown to the [place] the patient's niece and placed back in a enclosed cup. Day shift nurse was informed about it also.

## 2022-05-09 NOTE — PROGRESS NOTES
Radiation Oncology Inpatient Consultation  Encounter Date: 2022     Ms. Lida Walter is a 80 y.o. female  : 1933  MRN: [id number]  Acct Number: [de-identified]  Requesting Provider: Dr. Dave Jc    Reason for request: Brain metastasis      CONSULTANT: Александр Mancilla MD, PhD      DIAGNOSIS: C79.31 - Secondary malignant neoplasm of brain related to:   C50.512 -- Malignant neoplasm of lower outer quadrant left female breast; Infiltrating ductal carcinoma, Grade 3; rpT1c N0 M1, Stage IV; ER+; MN+; Her2+ [metastatic to liver at time of initial diagnosis]  Date of diagnosis: 5/10/2016      ASSESSMENT:  1. Breast cancer with solitary brain metastasis resected 2021. Patient declined recommended radiation therapy. Now with evidence of recurrent metastasis and scan very worrisome for possible meningeal involvement. 2. The diagnosis including representative imaging studies were reviewed with Latanya. She received a copy of the representative MRI images. 3. Treatment options and recommendations including current clinical practice guidelines (NCCN) were reviewed. In this situation, the top recommendation is in favor of repeat surgical resection followed by radiation therapy (preferably stereotactic cranial RT: :  4. Latanya received a copy of the clinical practice guideline recommendation pertaining to her situation. 5. We briefly reviewed some information regarding possible radiation therapy including potential number of treatments. A major concern again is the enhancement of the adjacent meninges. If she has meningeal involvement, then the appropriate treatment would be palliative radiation therapy to the operative bed and cranial meninges. This would require whole brain radiation therapy, though possibly with a simultaneous integrated boost to the operative bed. 10. Pauline Connie had the opportunity to ask questions and indicated that her questions were adequately addressed based on current information.   She indicated that she understood the discussion and is planning to proceed with surgery. Given the recurrence of her intracranial disease, she will be agreeable to proceeding with radiation therapy based on our current knowledge. PLAN:  1. Await surgical resection. 2. Dr. Manish Leon to see the patient for subsequent hospital care with further discussion regarding radiation therapy (whether stereotactic cranial irradiation limited to the operative bed or treatment including cranial meninges depending on circumstances). 3. Continue care with other physicians/providers  4. Continue basic/supportive health care in accordance with clinical practice guidelines      HISTORY OF PRESENT ILLNESS:   Scott Huang Is an 51-year-old woman with known metastatic breast cancer who was previously known to radiation oncology. She was initially seen for radiation oncology consultation by Dr. Manish Leon in July 2021 after being diagnosed with a solitary brain metastasis. She was found to have multiple liver masses and in May 2016 underwent biopsy. This unexpectedly showed metastatic adenocarcinoma favoring breast primary. Imaging then showed a mass in the lower outer quadrant left breast.  Biopsy of the left breast mass 5/23/2016 confirmed the presence of poorly differentiated invasive ductal carcinoma ER positive (40%, weak), WA positive (3%, weak), HER2/lacie positive with a proliferative index of 90%. Urmila Wiggins was initiated on chemotherapy and Herceptin. She apparently had a complete clinical and radiographic response to systemic therapy. She completed systemic therapy in 2019 and had been doing well until 2021 when she started having frequent falling episodes. She was brought to the emergency department after one of her falls when she hit her head. Scanning showed a 5 cm right temporal occipital brain mass.   She was seen for neurosurgical consultation and received a recommendation for surgical resection for symptom management and tissue diagnosis. Surgery was performed 7/2/2021 showing poorly differentiated carcinoma consistent with breast primary. Dr. Shannon Harp recommendation was for fractionated stereotactic cranial irradiation to the operative bed. Janice Snellen was scheduled to return for a follow-up in September 2021. She subsequently canceled the appointment opting to undergo systemic therapy and forego radiation therapy. INTERVAL HISTORY:  Janice Snellen was initiated on Enhertu under the care of Dr. Cindy Davenport.  She had favorable response to systemic therapy, And in October 2021 MRI scan of the brain indicated stability of the resection cavity. Janice Snellen was brought to the emergency department 5/6/2022 with complaints of increasing headache, mood/personality changes and nausea/vomiting. MRI scan shows a large cystic enhancing mass corresponding to the previous operative bed. There is also an area of abnormal enhancement along the adjacent meninges very worrisome for meningeal involvement. Janice Snellen has been seen for neurosurgical reconsultation (Dr. Shania Mendoza). In accordance with clinical practice guidelines she received a recommendation for repeat resection followed by radiation therapy. She is being seen today 5/9/2022 for reevaluation and discussion regarding the potential role of radiation therapy in the management of her metastatic breast cancer.       Past Medical History:   Diagnosis Date    Anxiety     Blind right eye     Breast cancer ([facility] 05/23/2016    [facility] @ [facility] only chemo, no surgery    Breast cancer metastasized to liver (5/16) and brain (7/21) ([facility] [address] [facility] 05/10/2016    Liver lesion noted 5/16 : Brain 7/21    Carotid stenosis      Left ICA 25%    Colostomy in place [facility]) 05/27/2016    Degenerative arthritis of cervical spine 05/2007    GERD (gastroesophageal reflux disease) 11/2006    History of craniotomy 07/2021    mets from breast CA    Hx antineoplastic chemo 2016    left breast cancer, no surgery    Hypercholesteremia     Hypoestrogenism     Hypothyroidism     Lumbago     Lumbosacral spinal stenosis 05/2019    MDRO (multiple drug resistant organisms) resistance 2008    pt stated cleared    Metastasis ([facility] [address] [facility] 2019    from breast to brain    Personal history of cardiac dysrhythmia     Respiratory tract infection due to COVID-19 virus 01/21/2022    Sigmoid diverticulosis 08/2004    Spondylolisthesis of lumbosacral region 08/2004    Steroid-induced hyperglycemia     Subclavian artery stenosis (HCC)     left    Superior and Inferior Pubic ramus fracture, right, closed, initial encounter ([facility] [address] [facility] 05/21/2019    SVT (supraventricular tachycardia) ([address] 06/2007    Temporal arteritis ([facility] [address] [facility]     Vertebral artery occlusion     left    Vitamin D deficiency 06/2017        Past Surgical History:   Procedure Laterality Date    CARDIAC CATHETERIZATION  5/07    CATARACT REMOVAL  right 1/08; left 2/08    CHOLECYSTECTOMY  1/03    COLONOSCOPY  11/06    COLOSTOMY  09/04/2018    REVERSAL OF COLOSTOMY    CRANIOTOMY Right 7/2/2021    CRANIOTOMY FOR RESECTION/DEBULKING OF RIGHT SIDE INTRA BRAIN TUMOR performed by Ever Ayoub MD at  [facility], COLON, DIAGNOSTIC      EYE REMOVAL Right 03/2017    EYE SURGERY      E[facility] SURG[facility] Right 11/06/2017     [facility] [address]      partial    OTHER SURGICAL HISTORY  05/27/2016    robotic assisted laparoscopic anterior colon resection and end colostomy    [place] OFFICE/OUTPT VISIT,PROCEDURE ONLY N/A 9/4/2018    COLOSTOMY REVERSAL AND PARASTOMAL HERNIA REPAIR performed by Isaac Salinas MD at [address] [facility]  2002    UPPER GASTROINTESTINAL ENDOSCOPY  11/06    US BREAST NEEDLE BIOPSY LEFT Left 05/23/2016    IDC       Family History   Problem Relation Age of Onset    Breast Cancer Sister 61        breast    Cancer Brother 79        lung    Cancer Brother 68        colon    Cancer Son 48        lung       Social History     Socioeconomic History    Marital status:      Spouse name: Not on file    Number of children: 0    Years of education: 10th grade     Highest education level: Not on file   Occupational History    Not on file   Tobacco Use    Smoking status: Former Smoker     Packs/day: 0.50     Types: Cigarettes    Smokeless tobacco: Never Used   Vaping Use    Vaping Use: Former    Substances: Always   Substance and Sexual Activity    Alcohol use: No    Drug use: No    Sexual activity: Never   Other Topics Concern    Not on file   Social History Narrative    Not on file     Social Determinants of Health     Financial Resource Strain: Low Risk     Difficulty of Paying Living Expenses: Not hard at all   Food Insecurity: No Food Insecurity    Worried About [address] in the Last Year: Never true    [address] [employer] in the Last Year: Never true   Transportation Needs:     Lack of Transportation (Medical): Not on file    Lack of Transportation (Non-Medical):  Not on file   Physical Activity:     Days of Exercise per Week: Not on file    Minutes of Exercise per Session: Not on file   Stress:     Feeling of Stress : Not on file   Social Connections:     Frequency of Communication with Friends and Family: Not on file    Frequency of Social Gatherings with Friends and Family: Not on file    Attends Taoism Services: Not on file    Active Member of [address] [place] [address] [employer] or Organizations: Not on file    Attends Club or Organization Meetings: Not on file    Marital Status: Not on file   Intimate Partner Violence:     Fear of Current or Ex-Partner: Not on file    Emotionally Abused: Not on file    Physically Abused: Not on file    Sexually Abused: Not on file   Housing Stability:     Unable to Pay for Housing in the Last Year: Not on file    Number of [place] in the Last Year: Not on file    Unstable Housing in the Last Year: Not on file     Exposure to Industrial/ environmental Carcinogens: no      ALLERGIES:   Allergies   Allergen Reactions    Bactrim [Sulfamethoxazole-Trimethoprim] Swelling     Of tongue    Demerol Rash     nausea    Pcn [Penicillins] Rash          No current facility-administered medications for this encounter. No current outpatient medications on file.      Facility-Administered Medications Ordered in Other Encounters   Medication Dose Route Frequency Provider Last Rate Last Admin    sodium chloride 3 % solution  35 mL/hr IntraVENous Continuous Jia Matters, APRN - CNP 35 mL/hr at 05/09/22 1650 35 mL/hr at 05/09/22 1650    hydrALAZINE (APRESOLINE) injection 5 mg  5 mg IntraVENous Q6H PRN Sherolyn Reagin, APRN - CNP   5 mg at 05/09/22 0154    levETIRAcetam (KEPPRA) tablet 500 mg  500 mg Oral BID Brittnee Lomeliin, APRN - CNP        cefTRIAXone (ROCEPHIN) 1,000 mg in sterile water 10 mL IV syringe  1,000 mg IntraVENous Once Tyler May MD        acetaminophen (TYLENOL) tablet 650 mg  650 mg Oral Q4H PRN Jia Ezequiel, APRN - CNP        isosorbide mononitrate (IMDUR) extended release tablet 30 mg  30 mg Oral Daily Sherolyn Reagin, APRN - CNP   30 mg at 05/09/22 0839    ALPRAZolam (XANAX) tablet 0.25 mg  0.25 mg Oral BID PRN Anthony Atwood MD   0.25 mg at 05/08/22 2057    dexmedetomidine (PRECEDEX) 400 mcg in sodium chloride 0.9 % 100 mL infusion  0.1-1.5 mcg/kg/hr IntraVENous Continuous Sherolyn Reagin, APRN - CNP   Stopped at 05/09/22 1153    docusate sodium (COLACE) capsule 100 mg  100 mg Oral BID Sherolyn Reagin, APRN - CNP   100 mg at 05/09/22 7943    levothyroxine (SYNTHROID) tablet 100 mcg  100 mcg Oral Daily Sherolyn Reagin, APRN - CNP   100 mcg at 05/09/22 2996    melatonin tablet 3 mg  3 mg Oral Nightly PRN Sherolyn Reagin, APRN - CNP   3 mg at 05/08/22 2153    pantoprazole (PROTONIX) tablet 40 mg  40 mg Oral QAM AC Sherolyn Reagin, APRN - CNP   40 mg at 05/09/22 5711    polyethylene glycol (GLYCOLAX) packet 17 g  17 g Oral Daily PRN PAMELA Francisco - CNP        [Held by provider] aspirin EC tablet 81 mg  81 mg Oral Daily Lori Rouannmarie, APRN - CNP   81 mg at 05/07/22 4341    sertraline (ZOLOFT) tablet 50 mg  50 mg Oral BID Lori Rouannmarie, APRN - CNP   50 mg at 05/09/22 6319    atorvastatin (LIPITOR) tablet 10 mg  10 mg Oral Nightly Lori Roulettmarilin, APRN - CNP   10 mg at 05/08/22 2057    dexamethasone (DECADRON) tablet 4 mg  4 mg Oral 4 times per day Maranda Obrien MD   4 mg at 05/09/22 1204    enoxaparin (LOVENOX) injection 40 mg  40 mg SubCUTAneous Daily Lori Roulette, APRN - CNP   40 mg at 05/09/22 2981    ondansetron (ZOFRAN-ODT) disintegrating tablet 4 mg  4 mg Oral Q8H PRN Lori Roulette, APRN - CNP        Or    ondansetron (ZOFRAN) injection 4 mg  4 mg IntraVENous Q6H PRN Lori Francolette, APRN - CNP         No outpatient medications have been marked as taking for the 5/9/22 encounter [facility]) with Beck Bennett MD.         LABORATORY STUDIES:   Onc labs:   Lab Results   Component Value Date     01/21/2022       Lab Results   Component Value Date    CREATININE 0.5 05/09/2022     Lab Results   Component Value Date    BUN 19 05/09/2022       CBC:   Lab Results   Component Value Date    WBC 6.3 05/09/2022    RBC 4.38 05/09/2022    RBC 4.23 04/08/2019    HGB 12.7 05/09/2022    HCT 40.8 05/09/2022    MCV 93.2 05/09/2022    MCH 29.0 05/09/2022    MCHC 31.1 05/09/2022    RDW 17.0 04/08/2019     05/09/2022    MPV 12.2 03/51/9518     MetabolicPanel:  Lab Results   Component Value Date     05/09/2022    K 4.1 05/09/2022    K 3.8 05/06/2022     05/09/2022    CO2 25 05/09/2022    BUN 19 05/09/2022    CREATININE 0.5 05/09/2022    AGRATIO 1.7 07/23/2018    LABGLOM >90 05/09/2022    GLUCOSE 141 05/09/2022    GLUCOSE 103 07/23/2018    PROT 6.4 05/06/2022    LABALBU 4.2 05/06/2022    CALCIUM 7.7 05/09/2022    BILITOT 0.5 05/06/2022    BILITOT NEGATIVE 11/13/2018    ALKPHOS 43 05/06/2022    AST 24 05/06/2022    ALT 14 05/06/2022         PATHOLOGY:   5/10/2016: Surgical pathology:  FINAL DIAGNOSIS:   Liver, masses, biopsy:    Metastatic adenocarcinoma, favor breast primary (CK7+, GATA3 +).     5/23/2016: Surgical pathology:  FINAL DIAGNOSIS:   Left breast mass, lower outer quadrant, barbell clip, core biopsies:   Glenys Reis poorly differentiated ductal adenocarcinoma, modified       Smith-Qiu grade III out of III. BREAST BIOMARKERS  5/25/16   Estrogen Receptor: (Clone SP1), [employer]        ( X ) Positive 40% of cells   Intensity of staining:        ( X ) Weak     Progesterone Receptor: (Clone 1E2), [employer]        ( X ) Positive 3% of cells   Intensity of Staining:        ( X ) Weak     Ki-67 (clone 30-9)        Percentage of positive nuclei:  90%                Favorable  < 10%                Borderline 10-20%                Unfavorable  > 20%     Inform HER-2 Dual LUISANA ([employer])   ( X )  Amplified - HER2/CEP17 ratio > 2.0 OR average HER2 copy             number > 6.0 signals/cell     7/2/2021: Surgical pathology:  FINAL DIAGNOSIS:   Addendum Report (07/07/2021):   A-B.  Right parietal lobe, masses 1 and 2, biopsies:    Metastatic carcinoma consistent with breast primary.    See microscopic description. Addendum note: At the request of the patient's oncologist, Dr. Jassi Leroy, receptor studies for ER, TX, and Her-2/lacie were performed on   this specimen.  The results of these studies are as follows:   BREAST BIOMARKERS*   Estrogen Receptor: (Clone SP1), [employer]    Negative (<1% of cells staining)     Progesterone Receptor: (Clone 1E2), [employer]    Negative (<1% of cells staining)          2018 ASCO/ CAP   Inform HER2 Dual LUISANA        HER2 dual LUISANA    [employer]        Group #   ( X  Group 1:         HER2/CEP17 Ratio >=.0 and >=.0 HER2 )   signals/cell                         HER2 LUISANA POSITIVE         RADIOLOGIC STUDIES:  6/28/2021: MRI brain with/without contrast:  Impression  Heterogeneous 5.5 cm nodular peripherally enhancing cystic and solid mass centered at the right occipital/temporal lobe junction with prominent adjacent T2/FLAIR prolongation involving the corpus callosum, temporal, parietal and frontal lobes with    prominent mass effect on the right lateral ventricle and 9 mm leftward midline shift. 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 7/4/2021: MRI brain with/without contrast:  Impression   1. The patient has undergone interval removal of the large mass in the right posterior temporal lobe. 2. There is a residual 3.9 x 2.1 x 1.7 cm cavity at the surgical site with increased T1 weighted signal intensity suggestive of hemorrhage. 3. There is moderate surrounding edema. 4. There is significantly less mass effect and midline deviation than on previous study. 5. There are probable ischemic changes in the white matter with no evidence of an acute infarct. 6. There are postoperative changes in the right orbit. 7. Otherwise negative MRI scan of the brain with and without intravenous contrast.        10/21/2021: MRI brain with/without contrast:  Impression   1. Postoperative changes involving the right parietal and temporal calvarium. 2. 3.7 x 2.2 cm cavitary lesion in the right temporal/occipital lobe with blood products no wall of this cavity. No abnormal enhancement or significant restricted diffusion. 3. Small meningioma over the left frontal lobe, unchanged. 4. Mild atrophy and dilatation of the third and lateral ventricles. 5. Increased signal intensity in the white matter and in the jocelin most likely representing ischemic changes. 6. Prosthetic globe in the right orbit. Status post lens extractions on the left side. 7. Otherwise negative MRI scan of the brain with and without intravenous contrast.       5/6/2022: MRI brain with/without contrast:  Impression   Large irregular enhancing cystic mass on the right with surrounding edema. Primary considerations would be recurrent malignancy versus abscess   Persistent linear enhancement along the meninges anterior left parietal    lobe. Review of Systems  Constitutional: Generalized fatigue. Denies fever, chills, unexpected weight change. HENT: Denies dysphagia or odynophagia. Denies new vision or hearing change. Respiratory: Dyspnea on exertion. Denies worsening dyspnea, coughing, wheezing or sputum   Cardiovascular: Denies chest pain, palpitations or syncope  Gastrointestinal: Complains of nausea, vomiting and intermittent loose bowels. Denies hematemesis or rectal bleeding. Genitourinary: Denies dysuria or hematuria  Musculoskeletal: Occasional aches and pains. Denies new or unusual bone pain. Skin: Denies rashes or ulcerations  Neurological: Recent Personality change, Headaches, \"Dizziness\". Denies seizures, fainting. Psychiatric/Behavioral: Some personality change with patient stating she has been \"mean\" to those around her. A complete review of systems was performed and found to be negative except as presented above. PHYSICAL EXAMINATION:   VITAL SIGNS: Height: 5 feet. Weight: 140 pounds 10.5 ounces. Temperature: 35.5 Celsius. Pulse: 48.  Respirations: 13.  Blood pressure: 104/54. Pulse oximetry: O2 saturation 96% on 2 L oxygen per nasal cannula. ECOG PERFORMANCE STATUS: 3  PAIN RATIN/10  GENERAL: Frail-appearing adult Female. In no acute distress. Alert and oriented ×3; clear mentation with appropriate affect  SKIN: Warm and dry, without jaundice, ecchymoses, or petechiae. HEENT: Normocephalic, atraumatic. PERRL/EOMI; ears, nose and lips unremarkable on external examination;   NECK:  No JVD; no palpable cervical adenopathy.   THORAX: No palpable supraclavicular or axillary adenopathy;  LUNGS: Scant basilar crackles otherwise clear  CARDIAC: Nontachycardic  ABDOMEN: bowel sounds present  EXTREMITIES: No clubbing or cyanosis  NEUROLOGIC: Cranial nerves grossly intact      Thank you for allowing my assistance in Paralee Frame' care. Miguel A Martin MD, PhD  Radiation Oncology       CC: Gloria Blue  ACC: Tumor Registry: [address]      This document was created using a voice-recognition program.  Computer generated transcription errors may be present.

## 2022-05-09 NOTE — PROGRESS NOTES
This RN informed Eze Camejo about the increased confusion and agitated mood of the patient. The NP gave an order for precedex drip to be started but watch out on the HR.

## 2022-05-09 NOTE — CARE COORDINATION
DISCHARGE/PLANNING EVALUATION  5/9/22, 3:01 PM EDT    Reason for Referral: Current with home care  Mental Status: Has some confusion, but  niece in the room also  Decision Making: Makes own decisions, has family support  Family/Social/Home Environment: Patient stated that she resides at home alone in a one story home with a step inside. She stated that she uses a walker. Current Services including food security, transportation and housekeeping:  Family, self or [employer] aide  Current Equipment: [address]: Medicare, Medicaid  Concerns or Barriers to Discharge: NA  Post acute provider list with quality measures, geographic area and applicable managed care information provided. Questions regarding selection process answered: Declined current with [facility], [employer]    Teach Back Method used with Latanya regarding care plan and options for discharge. Patient and Niece verbalize understanding of the plan of care and contribute to goal setting. Patient goals, treatment preferences and discharge plan: Spoke with patient and she hopes to return home with current [employer] services and [facility]. She did state that [facility] in the past and would return there if needed. Spoke with Alem Milton at [facility] [phone] and patient has emergency response system, meals and RN for med box set up and aide. Spoke with Arcenio Mahmood and patient is current but would need new orders for skilled care.      Electronically signed by MIGEL Blood on 5/9/2022 at 3:01 PM

## 2022-05-09 NOTE — PLAN OF CARE
Problem: Discharge Planning  Goal: Discharge to home or other facility with appropriate resources  Description: Discharge planning in progress. Outcome: Progressing     Problem: Safety - Adult  Goal: Free from fall injury  Description: Family at bedside. Bed alarm activated. Outcome: Progressing     Problem: Chronic Conditions and Co-morbidities  Goal: Patient's chronic conditions and co-morbidity symptoms are monitored and maintained or improved  Description: Monitoring co-morbidities. Outcome: Progressing     Problem: Nutrition Deficit:  Goal: Optimize nutritional status  Description: NPO at this time. Monitoring for nutritional needs. Outcome: Progressing     Problem: ABCDS Injury Assessment  Goal: Absence of physical injury  Description: No physical injury noted. Outcome: Progressing     Problem: Skin/Tissue Integrity  Goal: Absence of new skin breakdown  Description: 1. Monitor for areas of redness and/or skin breakdown  2. Assess vascular access sites hourly  3. Every 4-6 hours minimum:  Change oxygen saturation probe site  4. Every 4-6 hours:  If on nasal continuous positive airway pressure, respiratory therapy assess nares and determine need for appliance change or resting period.   Outcome: Progressing

## 2022-05-09 NOTE — PROGRESS NOTES
RADIATION ONCOLOGY: BRIEF NOTE    Latanya Cr Is known to radiation oncology, having been seen by Dr. Joelle Marin for consultation In July 2021. The plan was for Dr. Joelle Marin to meet with the patient for follow-up after hospital discharge. However, she did not come in for that appointment and has not been seen in radiation oncology since her initial inpatient consultation. I will be meeting with Marybel Da Silva later today for evaluation and discussion regarding her imaging. She will subsequently be seen by Dr. Joelle Marin for further management.     Kiran Pena, PhD, MD

## 2022-05-09 NOTE — PROGRESS NOTES
Addendum by Dr. Nick Leon MD:  I have seen and examined the patient independently. Face to face evaluation and examination were performed. The below evaluation and note have been reviewed. Labs and radiographs were reviewed. I Have discussed with Neurosurgery PA about this patient in detail. Time spent with patient 35 minutes.  Time could have been discontiguous. Time does not include procedures.  Time does include my direct assessment of the patient and coordination of care.  100% decision making by myself. Time represents more than 50% of the time involved with patient care by the neurosurgical team  I agree with below assessment and plan.  Please see my modifications mentioned below.      My additional comments and modifications:     · Confusion issue over the night. · There is no significant change in patient symptoms and neurological exam compared with yesterday.     Patient's recommendation and treatment plan from neurosurgical perspective at this time:     · Continue medical management per ICU team.  · Seizure precaution (Keppra 500 mg every 12h). · Dexamethasone 4 mg every 6 IV. · Continue the hypertonic 3% saline with the main target of sodium of 145-150. · I discussed the case in detail with ICU team and with radiation oncology team.  · Plan for surgical intervention this Wednesday\" reexploration of right-sided craniotomy and maximum safe resection of recurrent right-sided brain metastatic tumor\". · Brain MRI with and without contrast [place] protocol. · Brain CT without contrast [place] protocol. · Medical and cardiology clearance for surgical intervention.   · Neurosurgery will follow.  Prosper Kinney MD           Neurosurgery Progress Note    Patient:  Wild Harrison      Unit/Bed:4D-08/008-A    YOB: 1933    MRN: [id number]     Acct: [de-identified]     Admit date: 5/6/2022    Chief Complaint   Patient presents with    Abdominal Pain       Patient Seen, Chart, Physician notes, Labs, Radiology studies reviewed. Subjective:   Patient sitting up in bed. No family at bedside. Patient continues with complaints of fatigue. Patient denies nausea and vomiting. Patient requesting water to drink. Patient with complaints of dizziness. Patient denies numbness and headaches this a.m. Patient did not sleep well last evening. Patient with confusion this am.    Past, Family, Social History unchanged from admission. Diet:  ADULT DIET; Regular    Medications:  Scheduled Meds:   isosorbide mononitrate  30 mg Oral Daily    docusate sodium  100 mg Oral BID    levothyroxine  100 mcg Oral Daily    pantoprazole  40 mg Oral QAM AC    [Held by provider] aspirin  81 mg Oral Daily    sertraline  50 mg Oral BID    atorvastatin  10 mg Oral Nightly    dexamethasone  4 mg Oral 4 times per day    levetiracetam  500 mg IntraVENous Q12H    enoxaparin  40 mg SubCUTAneous Daily     Continuous Infusions:   sodium chloride 35 mL/hr (05/09/22 0124)    dexmedetomidine 0.6 mcg/kg/hr (05/09/22 0700)     PRN Meds:hydrALAZINE, ALPRAZolam, melatonin, polyethylene glycol, ondansetron **OR** ondansetron    Objective:    Vitals: BP (!) 156/62   Pulse 55   Temp 97.8 °F (36.6 °C) (Oral)   Resp 23   Ht 5' (1.524 m)   Wt 140 lb 10.5 oz (63.8 kg)   LMP  (LMP Unknown) Comment: age 52 ovaries intact  SpO2 95%   BMI 27.47 kg/m²     Physical Exam  Constitutional:       Appearance: Normal appearance. She is not ill-appearing. HENT:      Nose: Nose normal.      Mouth/Throat:      Mouth: Mucous membranes are moist.   Eyes:      Pupils: Pupils are equal, round, and reactive to light. Cardiovascular:      Rate and Rhythm: Normal rate. Pulses: Normal pulses. Pulmonary:      Effort: Pulmonary effort is normal.   Abdominal:      General: Bowel sounds are normal.   Musculoskeletal:         General: No tenderness. Cervical back: Normal range of motion. Skin:     General: Skin is warm and dry. Findings: No erythema. Neurological:      Mental Status: She is alert. She is disoriented. Sensory: No sensory deficit. Motor: No weakness. Psychiatric:         Mood and Affect: Mood normal.         Behavior: Behavior normal.         Thought Content: Thought content normal.         Judgment: Judgment normal.          Physical Exam:  Alert and attentive. Language appropriate, with no aphasia. Pupils equal.  Facial strength symmetric. Review of Systems   Constitutional: Positive for fatigue. Negative for activity change and appetite change. HENT: Negative for trouble swallowing. Eyes: Positive for visual disturbance (blind). Respiratory: Negative for cough and shortness of breath. Cardiovascular: Negative for chest pain. Gastrointestinal: Negative for constipation and nausea. Musculoskeletal: Negative for back pain and gait problem. Skin: Negative for color change, rash and wound. Neurological: Positive for dizziness. Negative for weakness, numbness and headaches. Psychiatric/Behavioral: Positive for confusion and sleep disturbance. The patient is not nervous/anxious. 24 hour intake/output:    Intake/Output Summary (Last 24 hours) at 5/9/2022 0901  Last data filed at 5/9/2022 0700  Gross per 24 hour   Intake 1636.94 ml   Output 2650 ml   Net -1013.06 ml     Last 3 weights:   Wt Readings from Last 3 Encounters:   05/09/22 140 lb 10.5 oz (63.8 kg)   05/06/22 134 lb (60.8 kg)   01/22/22 134 lb (60.8 kg)         CBC:   Recent Labs     05/07/22  1450 05/08/22  0703 05/09/22  0611   WBC 6.0 9.9 6.3   HGB 12.1 12.3 12.7    152 125*     BMP:    Recent Labs     05/07/22  0554 05/07/22  0820 05/08/22  0344 05/08/22  0703 05/08/22  1900 05/08/22  2247 05/09/22  0611      < > 138   < > 140 140 141   K 4.4  --  3.9  --   --   --  4.1     --  104  --   --   --  107   CO2 23  --  26  --   --   --  25   BUN 18  --  19  --   --   --  19   CREATININE 0.6  --  0.7  --   -- --  0.5   GLUCOSE 131*  --  145*  --   --   --  141*    < > = values in this interval not displayed. Calcium:  Recent Labs     05/09/22  0611   CALCIUM 7.7*     Magnesium:  Recent Labs     05/07/22  0554   MG 1.7     Glucose:No results for input(s): POCGLU in the last 72 hours. HgbA1C: No results for input(s): LABA1C in the last 72 hours. Lipids: No results for input(s): CHOL, TRIG, HDL, LDL, LDLCALC in the last 72 hours. Radiology reports as per the Radiologist  Radiology: CT Head WO Contrast    Result Date: 5/6/2022  CT head without contrast Comparison: 10/29/2021 10:19 AM EDT Findings: There has been prior right temporoparietal craniotomy. There is new edema right temporal lobe and occipital lobe. This extends into the posterior right parietal lobe as well. There appear to be underlying cystic lesions in this region. Largest measures 2 cm, not well seen on noncontrasted CT. Follow-up contrast enhanced MRI recommended to assess for recurrent mass No acute intracranial hemorrhage is demonstrated. No acute bony abnormalities. Sinuses and mastoids clear. Right ocular prosthesis. Impression: New edema right temporal, occipital and parietal lobes. There appear to be underlying cystic masses, 1 of which measures 2 cm. These are not well assessed, recommend follow-up contrast-enhanced MRI. This document has been electronically signed by: Jeremy Moody MD on 05/06/2022 09:06 PM All CTs at this facility use dose modulation techniques and iterative reconstructions, and/or weight-based dosing when appropriate to reduce radiation to a low as reasonably achievable. XR CHEST PORTABLE    Result Date: 5/7/2022  EXAM: CHEST X-RAY, SINGLE VIEW PORTABLE: COMPARISON:  CR,SR - XR CHEST PORTABLE - 05/07/2022 01:01 AM EDT FINDINGS: Right internal jugular central line has been placed, tip is at the cavoatrial junction. Heart size remains within normal limits. Central vascular congestion is again suspected.  Question of new infiltrate or pneumonitis in the right lung base, not present on the earlier study. 1. Unremarkable central line placement. 2. Question of new infiltrate or pneumonitis in right lung base. This document has been electronically signed by: Sher Layton MD on 05/07/2022 06:10 AM    XR CHEST PORTABLE    Result Date: 5/7/2022  1 view chest x-ray. Comparison: 1/23/2022, 6/3/2019 Findings: Lungs are clear without acute infiltrates. Stable prominence right medial apex. Suspect this is vascular prominence. No pneumothorax. Heart size normal. No acute bony abnormalities. Impression: No acute processes. This document has been electronically signed by: Marcel Rojo MD on 05/07/2022 04:25 AM    MRI BRAIN W WO CONTRAST    Result Date: 5/6/2022  MR brain without and with contrast Comparison: CT same date, MRI 10/21/2021 Findings: Multilocular cystic enhancing lesion right parieto-occipital lobes. Finding appears to represent a single large lesion rather than multiple. Overall measurements somewhat difficult due to the irregular configuration. Findings approximately 2.7 cm transverse by 4.5 cm AP by 4 cm height. There is overlying thickening and enhancement of the meninges. Possibilities include recurrent malignancy versus abscess. There is surrounding edema right temporal, parietal and occipital lobes. No other abnormal areas of contrast enhancement demonstrated. Nonspecific dural thickening and enhancement anterior left lobe. Prior study images obscure the lesion with measurement calipers. Finding is grossly unchanged from this previous study. No acute signal abnormalities on diffusion-weighted imaging. Chronic ischemic white matter disease noted with mild volume loss. Midline structures are intact and within normal limits. Normal flow voids are noted within the vascular structures. Sinuses and mastoids are clear. Right ocular prosthesis.      Impression: Large irregular enhancing cystic mass on the right with surrounding edema. Primary considerations would be recurrent malignancy versus abscess Persistent linear enhancement along the meninges anterior left parietal lobe. This document has been electronically signed by: Albin Machado MD on 05/06/2022 11:54 PM                   Assessment:    Principal Problem:    Brain mass  Active Problems:    Brain metastases ([address]  Resolved Problems:    * No resolved hospital problems. *        Plan:  -Continue medical management per ICU team  -Patient with confusion and agitation yesterday and was started on a Precedex drip.  -Seizure precautions (Keppra)  -Patients son to arrive Tuesday from [place].  [address] [place] [address] ICU, CNP discussed surgical  intervention via telephone on 5-8-2022   -Surgical intervention in the form of right-sided craniotomy for maximum resection of right-sided recurrent metastatic brain lesion possible Wednesday depending OR availability and medical and cardiology clearance  -Medical and cardiology clearance for surgical intervention  -Medical and radiation oncology on board  -Neurosurgery to follow  Electronically signed by PAMELA Thompson - CNP on 5/9/2022 at 9:01 AM    Neurosurgery

## 2022-05-09 NOTE — PROGRESS NOTES
Pharmacy Intravenous to Oral Protocol  Medication changed per P&T protocol: Keppra    Patient meets criteria based on the followin. IV therapy > 24 hours - Yes  2. Nausea/vomiting - No  3. Regular diet - Yes  4. Tolerating other oral medications:  Yes

## 2022-05-09 NOTE — PROGRESS NOTES
Physician Progress Note      Yordy Olsen  CSN #:                  [id number]  :                       1933  ADMIT DATE:       2022 7:05 PM  [address] [place] DATE:  RESPONDING  PROVIDER #:        Jessi TEJADA          QUERY TEXT:    Patient admitted with Multiple right-sided cystic brain masses. Noted   documentation of acute hypoxic  respiratory failure in Critical Care Note on   22 . In order to support the diagnosis of acute respiratory failure,   please make selection  below,  include additional clinical indicators in your   documentation. Or please document if the diagnosis of acute respiratory   failure has been ruled out after further study. The medical record reflects the following:  Risk Factors: brain mets , cerebral edema, atelectasis  Clinical Indicators: Oxygen saturations maintained 90  100 on  2 liters per Nasal cannula with PF ratio 321  332 , Resp 14  24 ,  chest x-ray for new infiltrate. Patient has no fever no leukocytosis no   cough no sputum production. Treatment:  continued monitoring,  Increase activity and up to chair.     Incentive spirometry , imaging as required, supplemental oxygen as required      Acute Respiratory Failure Clinical Indicators per  MS-DRG Training Guide and   Quick Reference Guide:  pO2 < 60 mmHg or SpO2 (pulse oximetry) < 91% breathing room air  pCO2 > 50 and pH < 7.35  P/F ratio (pO2 / FIO2) < 300  pO2 decrease or pCO2 increase by 10 mmHg from baseline (if known)  Supplemental oxygen of 40% or more  Presence of respiratory distress, tachypnea, dyspnea, shortness of breath,   wheezing  Unable to speak in complete sentences  Use of accessory muscles to breathe  Extreme anxiety and feeling of impending doom  Tripod position  Confusion/altered mental status/obtunded    Thank you ,  Marc Feliz  RN, BSN, [address] [place] [address]   P: [phone]  F: [phone]  Options provided:  -- Acute Respiratory Failure as evidenced by, Please document evidence. -- Acute Respiratory Failure ruled out after study  -- Acute Respiratory Failure ruled out after study, Oxygen placed for comfort  -- Other - I will add my own diagnosis  -- Disagree - Not applicable / Not valid  -- Disagree - Clinically unable to determine / Unknown  -- Refer to Clinical Documentation Reviewer    PROVIDER RESPONSE TEXT:    Acute Respiratory Failure has been ruled out after study. Query created by: Elizabeth Domingo on 5/9/2022 10:10 AM      QUERY TEXT:    Pt admitted with Brain mets cerebral edema . Pt noted to have new confusion . If possible, please make selection below , document in the progress notes and   discharge summary if you are evaluating and / or treating any of the   following: The medical record reflects the following:  Risk Factors: Multiple right-sided cystic brain masses, with cerebral edema  Clinical Indicators: new confusion and agitation  Treatment:  Precedex drip, continued monitoring and treatment of mets to brain      Thank you,  Tomas Biggs  RN, BSN, [facility]  Clinical   P: [phone]  F: [phone]  Options provided:  -- Encephalopathy due to brain tumor  -- Metabolic encephalopathy due to brain tumor, cerebral edema  -- Other - I will add my own diagnosis  -- Disagree - Not applicable / Not valid  -- Disagree - Clinically unable to determine / Unknown  -- Refer to Clinical Documentation Reviewer    PROVIDER RESPONSE TEXT:    This patient has metabolic encephalopathy, cerebral edema .     Query created by: Elizabeth Domingo on 5/9/2022 10:16 AM      Electronically signed by:  Yosi Andersen 5/9/2022 1:57 PM

## 2022-05-10 ENCOUNTER — APPOINTMENT (OUTPATIENT)
Dept: CT IMAGING | Age: 87
DRG: 025 | End: 2022-05-10
Payer: MEDICARE

## 2022-05-10 LAB
ANION GAP SERPL CALCULATED.3IONS-SCNC: 9 MEQ/L (ref 8–16)
BUN BLDV-MCNC: 19 MG/DL (ref 7–22)
CALCIUM SERPL-MCNC: 7.8 MG/DL (ref 8.5–10.5)
CHLORIDE BLD-SCNC: 110 MEQ/L (ref 98–111)
CO2: 24 MEQ/L (ref 23–33)
CREAT SERPL-MCNC: 0.5 MG/DL (ref 0.4–1.2)
ERYTHROCYTE [DISTWIDTH] IN BLOOD BY AUTOMATED COUNT: 14 % (ref 11.5–14.5)
ERYTHROCYTE [DISTWIDTH] IN BLOOD BY AUTOMATED COUNT: 47.2 FL (ref 35–45)
GFR SERPL CREATININE-BSD FRML MDRD: > 90 ML/MIN/1.73M2
GLUCOSE BLD-MCNC: 122 MG/DL (ref 70–108)
HCT VFR BLD CALC: 39.6 % (ref 37–47)
HEMOGLOBIN: 12.3 GM/DL (ref 12–16)
MCH RBC QN AUTO: 28.6 PG (ref 26–33)
MCHC RBC AUTO-ENTMCNC: 31.1 GM/DL (ref 32.2–35.5)
MCV RBC AUTO: 92.1 FL (ref 81–99)
PLATELET # BLD: 146 THOU/MM3 (ref 130–400)
PMV BLD AUTO: 11.6 FL (ref 9.4–12.4)
POTASSIUM SERPL-SCNC: 3.8 MEQ/L (ref 3.5–5.2)
RBC # BLD: 4.3 MILL/MM3 (ref 4.2–5.4)
SODIUM BLD-SCNC: 143 MEQ/L (ref 135–145)
SODIUM BLD-SCNC: 144 MEQ/L (ref 135–145)
SODIUM BLD-SCNC: 145 MEQ/L (ref 135–145)
SODIUM BLD-SCNC: 146 MEQ/L (ref 135–145)
WBC # BLD: 9.5 THOU/MM3 (ref 4.8–10.8)

## 2022-05-10 PROCEDURE — 6370000000 HC RX 637 (ALT 250 FOR IP): Performed by: NURSE PRACTITIONER

## 2022-05-10 PROCEDURE — 85027 COMPLETE CBC AUTOMATED: CPT

## 2022-05-10 PROCEDURE — 99291 CRITICAL CARE FIRST HOUR: CPT | Performed by: INTERNAL MEDICINE

## 2022-05-10 PROCEDURE — APPSS30 APP SPLIT SHARED TIME 16-30 MINUTES

## 2022-05-10 PROCEDURE — 6360000002 HC RX W HCPCS: Performed by: STUDENT IN AN ORGANIZED HEALTH CARE EDUCATION/TRAINING PROGRAM

## 2022-05-10 PROCEDURE — 84295 ASSAY OF SERUM SODIUM: CPT

## 2022-05-10 PROCEDURE — 6370000000 HC RX 637 (ALT 250 FOR IP): Performed by: INTERNAL MEDICINE

## 2022-05-10 PROCEDURE — 80048 BASIC METABOLIC PNL TOTAL CA: CPT

## 2022-05-10 PROCEDURE — 6360000002 HC RX W HCPCS: Performed by: NURSE PRACTITIONER

## 2022-05-10 PROCEDURE — 2000000000 HC ICU R&B

## 2022-05-10 PROCEDURE — 36415 COLL VENOUS BLD VENIPUNCTURE: CPT

## 2022-05-10 PROCEDURE — 70450 CT HEAD/BRAIN W/O DYE: CPT

## 2022-05-10 PROCEDURE — 94640 AIRWAY INHALATION TREATMENT: CPT

## 2022-05-10 PROCEDURE — 2580000003 HC RX 258: Performed by: NURSE PRACTITIONER

## 2022-05-10 RX ORDER — ALBUTEROL SULFATE 90 UG/1
2 AEROSOL, METERED RESPIRATORY (INHALATION) EVERY 6 HOURS PRN
Status: DISCONTINUED | OUTPATIENT
Start: 2022-05-10 | End: 2022-05-13

## 2022-05-10 RX ORDER — HYDROCODONE BITARTRATE AND ACETAMINOPHEN 5; 325 MG/1; MG/1
1 TABLET ORAL EVERY 6 HOURS PRN
Status: DISCONTINUED | OUTPATIENT
Start: 2022-05-10 | End: 2022-05-19 | Stop reason: HOSPADM

## 2022-05-10 RX ORDER — 3% SODIUM CHLORIDE 3 G/100ML
35 INJECTION, SOLUTION INTRAVENOUS CONTINUOUS
Status: DISPENSED | OUTPATIENT
Start: 2022-05-10 | End: 2022-05-10

## 2022-05-10 RX ORDER — HYDROCODONE BITARTRATE AND ACETAMINOPHEN 5; 325 MG/1; MG/1
2 TABLET ORAL EVERY 6 HOURS PRN
Status: DISCONTINUED | OUTPATIENT
Start: 2022-05-10 | End: 2022-05-19 | Stop reason: HOSPADM

## 2022-05-10 RX ORDER — 3% SODIUM CHLORIDE 3 G/100ML
35 INJECTION, SOLUTION INTRAVENOUS CONTINUOUS
Status: DISPENSED | OUTPATIENT
Start: 2022-05-10 | End: 2022-05-11

## 2022-05-10 RX ORDER — HYDROCODONE BITARTRATE AND ACETAMINOPHEN 5; 325 MG/1; MG/1
1 TABLET ORAL ONCE
Status: COMPLETED | OUTPATIENT
Start: 2022-05-10 | End: 2022-05-10

## 2022-05-10 RX ADMIN — LEVOTHYROXINE SODIUM 100 MCG: 0.1 TABLET ORAL at 08:02

## 2022-05-10 RX ADMIN — ALPRAZOLAM 0.25 MG: 0.25 TABLET ORAL at 18:25

## 2022-05-10 RX ADMIN — Medication 3 MG: at 20:17

## 2022-05-10 RX ADMIN — ISOSORBIDE MONONITRATE 30 MG: 30 TABLET, EXTENDED RELEASE ORAL at 08:02

## 2022-05-10 RX ADMIN — HYDROCODONE BITARTRATE AND ACETAMINOPHEN 2 TABLET: 5; 325 TABLET ORAL at 18:25

## 2022-05-10 RX ADMIN — SERTRALINE 50 MG: 50 TABLET, FILM COATED ORAL at 08:02

## 2022-05-10 RX ADMIN — ALPRAZOLAM 0.25 MG: 0.25 TABLET ORAL at 09:44

## 2022-05-10 RX ADMIN — SODIUM CHLORIDE 35 ML/HR: 3 INJECTION, SOLUTION INTRAVENOUS at 16:44

## 2022-05-10 RX ADMIN — ENOXAPARIN SODIUM 40 MG: 100 INJECTION SUBCUTANEOUS at 08:02

## 2022-05-10 RX ADMIN — DEXAMETHASONE 4 MG: 4 TABLET ORAL at 02:27

## 2022-05-10 RX ADMIN — DEXAMETHASONE 4 MG: 4 TABLET ORAL at 18:25

## 2022-05-10 RX ADMIN — DEXAMETHASONE 4 MG: 4 TABLET ORAL at 08:02

## 2022-05-10 RX ADMIN — ALBUTEROL SULFATE 2 PUFF: 90 AEROSOL, METERED RESPIRATORY (INHALATION) at 09:42

## 2022-05-10 RX ADMIN — PANTOPRAZOLE SODIUM 40 MG: 40 TABLET, DELAYED RELEASE ORAL at 08:02

## 2022-05-10 RX ADMIN — ACETAMINOPHEN 650 MG: 325 TABLET ORAL at 08:02

## 2022-05-10 RX ADMIN — DOCUSATE SODIUM 100 MG: 100 CAPSULE, LIQUID FILLED ORAL at 20:17

## 2022-05-10 RX ADMIN — LEVETIRACETAM 500 MG: 500 TABLET, FILM COATED ORAL at 08:02

## 2022-05-10 RX ADMIN — SODIUM CHLORIDE 35 ML/HR: 3 INJECTION, SOLUTION INTRAVENOUS at 00:20

## 2022-05-10 RX ADMIN — ATORVASTATIN CALCIUM 10 MG: 10 TABLET, FILM COATED ORAL at 23:07

## 2022-05-10 RX ADMIN — DEXAMETHASONE 4 MG: 4 TABLET ORAL at 12:41

## 2022-05-10 RX ADMIN — HYDROCODONE BITARTRATE AND ACETAMINOPHEN 1 TABLET: 5; 325 TABLET ORAL at 12:38

## 2022-05-10 RX ADMIN — HYDROCODONE BITARTRATE AND ACETAMINOPHEN 1 TABLET: 5; 325 TABLET ORAL at 02:28

## 2022-05-10 RX ADMIN — DOCUSATE SODIUM 100 MG: 100 CAPSULE, LIQUID FILLED ORAL at 08:02

## 2022-05-10 RX ADMIN — LEVETIRACETAM 500 MG: 500 TABLET, FILM COATED ORAL at 23:07

## 2022-05-10 ASSESSMENT — PAIN SCALES - GENERAL
PAINLEVEL_OUTOF10: 8
PAINLEVEL_OUTOF10: 3
PAINLEVEL_OUTOF10: 9
PAINLEVEL_OUTOF10: 3
PAINLEVEL_OUTOF10: 8

## 2022-05-10 ASSESSMENT — ENCOUNTER SYMPTOMS
TROUBLE SWALLOWING: 0
SORE THROAT: 0
ABDOMINAL PAIN: 0
BACK PAIN: 0
PHOTOPHOBIA: 0
CHEST TIGHTNESS: 0
WHEEZING: 0
SHORTNESS OF BREATH: 0
NAUSEA: 0
VOMITING: 0
COLOR CHANGE: 0

## 2022-05-10 ASSESSMENT — PAIN DESCRIPTION - LOCATION
LOCATION: HEAD

## 2022-05-10 ASSESSMENT — PAIN DESCRIPTION - DESCRIPTORS
DESCRIPTORS: DULL
DESCRIPTORS: ACHING
DESCRIPTORS: SHARP;PRESSURE

## 2022-05-10 ASSESSMENT — PAIN DESCRIPTION - ORIENTATION: ORIENTATION: RIGHT

## 2022-05-10 NOTE — SIGNIFICANT EVENT
Patient has a solitary brain mass. She would benefit from craniotomy and resection of the mass. This would place her survival and anticipated course back to baseline. With retaining the solitary brain mass, her survival will be shorter and her quality of life will be less capable. Patient has acceptable risk for surgery given her clinical coarse and anticipated recovery. Case discussed with Dr. Javad Márquez. He agrees with my assessment. Electronically signed by Geovanna Kelly MD.

## 2022-05-10 NOTE — CONSULTS
[address] [place] [address]                                  CONSULTATION    PATIENT NAME: Tyrell Osman                      :        1933  MED REC NO:   [id number]                           ROOM:       0008  ACCOUNT NO:   [de-identified]                           ADMIT DATE: 2022  PROVIDER:     Laura Vazquez. Bridger Medrano D.O.    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 HISTORY OF PRESENT ILLNESS:  The patient is an 80-year-old female  unbeknownst to me. The patient will be transferring care apparently to  me as an outpatient from Dr. Carey Adjutant office as he retired. I have not  seen this patient yet and I am not familiar with her case. I reviewed  the records from [address] [place] [address] on the electronic medical record, Epic, and  most of the history was obtained that way. The patient apparently has  had metastatic breast cancer when she was originally diagnosed. She had  a radiation oncology consult by Dr. Cecilio Ferreira in Radiation Therapy in  2021 when she was diagnosed with a solitary brain met. She had  resection of that metastasis and it was recommended that she undergo  radiation therapy to the tumor bed. She did not do that and now as a  result, the patient has recurrent metastatic disease that looks worst  than the original disease on the current MRI. The patient has meningeal  enhancement as well suggesting that the meninges may be involved with  her disease, which is very unfortunate. The patient had been under the care of Dr. Steve Ramirez.  The patient was  originally diagnosed with a ER positive 40% weak, IL positive 3% weak  and HER2-lacie positive 90% proliferation index breast cancer. She had  been on treatment with chemotherapy and Herceptin and completed her  treatments in , had done well apparently until  in July when she  developed her brain metastasis.   She had liver metastatic disease  apparently also at the time of diagnosis, left breast mass on 05/23/2016  showed poorly differentiated invasive ductal carcinoma. She also had a  liver biopsy that showed she had metastatic disease to the liver, but  apparently had responded to systemic therapy favorably. In 10/2021, an  MRI of the brain showed the stability of resection cavity, but she was  brought to the ER 05/06/2022 with a complaint of increasing headache,  mood and personality changes and nausea and vomiting. An MRI showed a  large cystic enhancing mass that was in the previous operative bed. She  has abnormal enhancement of the adjacent meninges. The patient is being  seen by Dr. Arlette Barthel for neurosurgical consultation and was seen by Dr. Kayla Contreras also for reevaluation and discussion of radiation therapy  with metastatic disease to the brain. This is where I differ from the  other physician. The patient is 80years of age and I do not seriously  believe that this disease is going to be very surgically amenable as it  has probably meningeal involvement and is a very irregular mass on the  MRI. I think it will be a suboptimal debulking and at her advanced age,  I am not sure that that would be the best idea. I understand the NCCN  guidelines, but I do not think they include an [de-identified]year-old in those  guideline. I would just recommend tide of radiation therapy at this  present time to the brain and get a PET scan as an outpatient to see if  she has disease anywhere else. I noted that on 01/22/2022, there was a  mass that was noted in the left tail of [place] that measured about 2.7  cm. There was no mention of that in any of Dr. Tiara Gallegos note from what I  had reviewed as an outpatient.   This was just mentioned when I perused  the chart and saw that a mammogram had been ordered and a CT of the  chest and the patient had a CT chest in January of this year that showed  that mass and I do not know if that was even addressed. PAST MEDICAL HISTORY:  Significant for anxiety, right eye blindness,  breast cancer at [facility] with chemotherapy, no surgery. She had a breast  cancer that metastasized to the liver and brain. The liver was in  05/2016, brain of 07/2021. Liver lesion noted in 05/2016. She has had  carotid stenosis with an ICA 25% on the left, colostomy, GERD, history  of craniotomy in 2021 from breast cancer metastasis of the brain,  history of chemotherapy use in 2016, hypercholesterolemia,  hyperestrogenemia, hypothyroidism, lumbago, sacral stenosis, cardiac  dysrhythmia. She had a COVID-19 infection in 01/2022, spondylolisthesis  in lumbosacral region, steroid-induced hyperglycemia, subclavian  stenosis on the left. Superior and inferior pubic ramus fracture,  closed, initial encounter with Orthopedics in 2019. 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 PAST SURGICAL HISTORY:  She had a cluster of surgery. She has had  cardiac cath, cataract, cholecystectomy, colonoscopy, colostomy with  reversal of colostomy, craniotomy in 2021, D and C of the uterus with  enucleation on the right, eye surgery right in [facility], partial  hysterectomy. She has had a robot-assisted laparoscopic anterior colon  resection and end colostomy in 2016, upper endoscopy and needle biopsy,  left breast 2016. FAMILY HISTORY:  Breast cancer in sister, lung cancer in brother, colon  cancer in brother, son had lung cancer. SOCIAL HISTORY:  She is a . No children, nulliparous. She was a  smoker formerly about a half a pack a day. No alcohol. ALLERGIES:  BACTRIM, DEMEROL, AND PENICILLIN. PHYSICAL EXAMINATION:  Entries taken from Epic note. GENERAL:  She is a chronically ill-appearing adult female. HEENT:  No scleral icterus. LUNGS:  Basically clear. ABDOMEN:  Mildly tender to bladder palpation. EXTREMITIES:  No edema. NEUROLOGIC:  A and O x3.   NODES:  No supraclavicular adenopathy. LABORATORY DATA:  As follows, sodium 141, potassium 4.1, chloride 107,  CO2 25, BUN 19, creatinine 0.5, calcium 7.7. White count 6300,  hemoglobin 12.7, hematocrit 28 and platelet count is 151,164. She did  have a tumor marker, CA 27-29, which was only 16, which is normal, but  may not be helpful. CT scan of the chest 01/21/2022 had suggested that  there was a 2.7 X 1.6 cm soft tissue area on the axillary tail on the  right breast.  She has MRI of the brain dated 05/06/2022, showing a  large irregular enhancing cystic mass in the right side with surrounding  edema. There also appears to be persistent linear enhancement along the  meninges anterior left parietal lobe. The mass measures 2.7 x 4.5 x 4  cm. There is thickening enhancement of the meninges, which is very  concerning. IMPRESSION:  1. The patient has recurrent metastatic disease to the brain in the  same resection cavity that was removed in 07/2021 and she still had  breast cancer. I need a PET scan once she is discharged from the  hospital.  I did get an opinion from the other doctors. Unfortunately,  she is 80years old and I do not think she should undergo surgery  especially with this irregular shape mass and possible meningeal  enhancement, palliation is probably best, so I would just do palliative  radiation therapy to the whole brain with focus of attention on the  tumor bed. The patient may need to undergo systemic therapy, but I will  have that discussion with her as an outpatient. The axillary tail needs  to be made attention to, to see if this is her area of recurrence that  caused systemic disease. A PET scan may be helpful as an outpatient to  delineate this as she will not be able to do that as an inpatient. See  orders for further plan. She is already on dexamethasone that helps her  with peripheral edema. Any questions, please do not hesitate to contact  me.         JEROME Parker:

## 2022-05-10 NOTE — SIGNIFICANT EVENT
Spoke with patient and niece at bedside. We did explain that the critical care team in [facility] do agree with Dr. Alyssa Thomas with surgical resection. We did explain that oncology feels that resection may not be optimal treatment plan. Patient states that she is comfortable with resection. She has had prior resection 1 year prior and did very well. She is very active for her age. Her niece was also at bedside. Her niece also agrees with resection. We did explain that if resection was not treatment plan goals of care would need to be palliative and possible hospice. States he voiced understanding. She did again confirm she would like to move forward with surgery. She states \"I have to keep moving\". All questions were answered. Plans for surgical resection tomorrow at 8 AM.    Electronically signed by Robi Sam.  PAMELA Rodriguez Ra, CNP on 5/10/2022 at 3:52 PM

## 2022-05-10 NOTE — PROGRESS NOTES
Addendum by Dr. Prisca Hernandez MD:  I have seen and examined the patient independently. Face to face evaluation and examination were performed. The below evaluation and note have been reviewed. Labs and radiographs were reviewed. I Have discussed with Neurosurgery PA about this patient in detail. Time spent with patient 25 minutes.  Time could have been discontiguous. Time does not include procedures.  Time does include my direct assessment of the patient and coordination of care.  100% decision making by myself. Time represents more than 50% of the time involved with patient care by the neurosurgical team  I agree with below assessment and plan.  Please see my modifications mentioned below.      My additional comments and modifications:       · There is no significant change in patient symptoms and neurological exam compared with yesterday.     -Today I had long discussion with patient and her family regarding patient overall current medical condition and tomorrow planned surgical intervention in detail. I discussed with them again tomorrow planned surgical intervention. I discussed with them potential treatment options such as to proceed with just palliative type of care. I discussed with them the recommendation of other treatment teams (ICU team, radiation oncology and medical oncology team). -The above discussion was carried out in front of neurosurgery ICU NP Ace Lopez). -All questions and concerns were addressed and answered.  -Patient and her family stated that they want to proceed with the surgical treatment option.  -Plan for surgery tomorrow.  -Please keep the patient n.p.o. after midnight.     Prisca Hernandez MD        Neurosurgery Progress Note    Patient:  [facility]      Unit/Bed:4D-08/008-A    YOB: 1933    MRN: [id number]     Acct: [de-identified]     Admit date: 5/6/2022    Chief Complaint   Patient presents with    Abdominal Pain       Patient Seen, Chart, Physician notes, Labs, Radiology studies reviewed. Subjective:     Patient seen and examined on the unit. Patient's niece Noah Gill is at bedside. Patient complains of headache and dizziness. Patient denies numbness and tingling. Patient stated she is nauseated this morning. Patient's niece stated patient is still slightly confused but much better and last evening. Per patient's nurse patient did not sleep well last evening. Past, Family, Social History unchanged from admission. Diet:  ADULT DIET; Regular    Medications:  Scheduled Meds:   levETIRAcetam  500 mg Oral BID    isosorbide mononitrate  30 mg Oral Daily    docusate sodium  100 mg Oral BID    levothyroxine  100 mcg Oral Daily    pantoprazole  40 mg Oral QAM AC    [Held by provider] aspirin  81 mg Oral Daily    sertraline  50 mg Oral BID    atorvastatin  10 mg Oral Nightly    dexamethasone  4 mg Oral 4 times per day    enoxaparin  40 mg SubCUTAneous Daily     Continuous Infusions:   sodium chloride 35 mL/hr (05/10/22 0632)     PRN Meds:hydrALAZINE, acetaminophen, phenol, ALPRAZolam, melatonin, polyethylene glycol, ondansetron **OR** ondansetron    Objective:    Vitals: BP (!) 147/111   Pulse 71   Temp 98.5 °F (36.9 °C) (Oral)   Resp 21   Ht 5' (1.524 m)   Wt 143 lb 15.4 oz (65.3 kg)   LMP  (LMP Unknown) Comment: age 52 ovaries intact  SpO2 (!) 88%   BMI 28.12 kg/m²     Physical Exam   Constitutional:       Appearance: Normal appearance. She is not ill-appearing. HENT:      Nose: Nose normal.      Mouth/Throat:      Mouth: Mucous membranes are moist.   Eyes:      Pupils: Pupils are equal, round, and reactive to light. Cardiovascular:      Rate and Rhythm: Normal rate. Pulses: Normal pulses. Pulmonary:      Effort: Pulmonary effort is normal.   Abdominal:      General: Bowel sounds are normal.   Musculoskeletal:         General: No tenderness. Cervical back: Normal range of motion. Skin:     General: Skin is warm and dry. Findings: No erythema. Neurological:      Mental Status: She is alert. She is disoriented. Sensory: No sensory deficit. Motor: No weakness. Psychiatric:         Mood and Affect: Mood normal.         Behavior: Behavior normal.         Thought Content: Thought content normal.         Judgment: Judgment normal.     Review of Systems     Review of Systems   Constitutional: Positive for fatigue. Negative for activity change and appetite change. HENT: Negative for trouble swallowing. Eyes: Positive for visual disturbance (blind). Respiratory: Negative for cough and shortness of breath. Cardiovascular: Negative for chest pain. Gastrointestinal: Negative for constipation and nausea. Musculoskeletal: Negative for back pain and gait problem. Skin: Negative for color change, rash and wound. Neurological: Positive for dizziness. Negative for weakness, numbness and headaches. Psychiatric/Behavioral: Positive for confusion and sleep disturbance. The patient is not nervous/anxious. 24 hour intake/output:    Intake/Output Summary (Last 24 hours) at 5/10/2022 0655  Last data filed at 5/10/2022 0535  Gross per 24 hour   Intake 1720.25 ml   Output 950 ml   Net 770.25 ml     Last 3 weights: Wt Readings from Last 3 Encounters:   05/10/22 143 lb 15.4 oz (65.3 kg)   05/06/22 134 lb (60.8 kg)   01/22/22 134 lb (60.8 kg)         CBC:   Recent Labs     05/08/22  0703 05/09/22  0611 05/10/22  0530   WBC 9.9 6.3 9.5   HGB 12.3 12.7 12.3    125* 146     BMP:    Recent Labs     05/08/22  0344 05/08/22  0703 05/09/22  0611 05/09/22  1120 05/09/22  2256 05/10/22  0231 05/10/22  0530      < > 141   < > 143 143 143   K 3.9  --  4.1  --   --   --  3.8     --  107  --   --   --  110   CO2 26  --  25  --   --   --  24   BUN 19  --  19  --   --   --  19   CREATININE 0.7  --  0.5  --   --   --  0.5   GLUCOSE 145*  --  141*  --   --   --  122*    < > = values in this interval not displayed. Calcium:  Recent Labs     05/10/22  0530   CALCIUM 7.8*     Magnesium:No results for input(s): MG in the last 72 hours. Glucose:No results for input(s): POCGLU in the last 72 hours. HgbA1C: No results for input(s): LABA1C in the last 72 hours. Lipids: No results for input(s): CHOL, TRIG, HDL, LDL, LDLCALC in the last 72 hours. Radiology reports as per the Radiologist  Radiology: CT Head WO Contrast    Result Date: 5/6/2022  CT head without contrast Comparison: 10/29/2021 10:19 AM EDT Findings: There has been prior right temporoparietal craniotomy. There is new edema right temporal lobe and occipital lobe. This extends into the posterior right parietal lobe as well. There appear to be underlying cystic lesions in this region. Largest measures 2 cm, not well seen on noncontrasted CT. Follow-up contrast enhanced MRI recommended to assess for recurrent mass No acute intracranial hemorrhage is demonstrated. No acute bony abnormalities. Sinuses and mastoids clear. Right ocular prosthesis. Impression: New edema right temporal, occipital and parietal lobes. There appear to be underlying cystic masses, 1 of which measures 2 cm. These are not well assessed, recommend follow-up contrast-enhanced MRI. This document has been electronically signed by: Umair Sales MD on 05/06/2022 09:06 PM All CTs at this facility use dose modulation techniques and iterative reconstructions, and/or weight-based dosing when appropriate to reduce radiation to a low as reasonably achievable. XR CHEST PORTABLE    Result Date: 5/7/2022  EXAM: CHEST X-RAY, SINGLE VIEW PORTABLE: COMPARISON:  NIKUNJ,SR - XR CHEST PORTABLE - 05/07/2022 01:01 AM EDT FINDINGS: Right internal jugular central line has been placed, tip is at the cavoatrial junction. Heart size remains within normal limits. Central vascular congestion is again suspected. Question of new infiltrate or pneumonitis in the right lung base, not present on the earlier study.      1. Unremarkable central line placement. 2. Question of new infiltrate or pneumonitis in right lung base. This document has been electronically signed by: Yarelis Doty MD on 05/07/2022 06:10 AM    XR CHEST PORTABLE    Result Date: 5/7/2022  1 view chest x-ray. Comparison: 1/23/2022, 6/3/2019 Findings: Lungs are clear without acute infiltrates. Stable prominence right medial apex. Suspect this is vascular prominence. No pneumothorax. Heart size normal. No acute bony abnormalities. Impression: No acute processes. This document has been electronically signed by: Clare Zhou MD on 05/07/2022 04:25 AM    MRI BRAIN W WO CONTRAST    Result Date: 5/6/2022  MR brain without and with contrast Comparison: CT same date, MRI 10/21/2021 Findings: Multilocular cystic enhancing lesion right parieto-occipital lobes. Finding appears to represent a single large lesion rather than multiple. Overall measurements somewhat difficult due to the irregular configuration. Findings approximately 2.7 cm transverse by 4.5 cm AP by 4 cm height. There is overlying thickening and enhancement of the meninges. Possibilities include recurrent malignancy versus abscess. There is surrounding edema right temporal, parietal and occipital lobes. No other abnormal areas of contrast enhancement demonstrated. Nonspecific dural thickening and enhancement anterior left lobe. Prior study images obscure the lesion with measurement calipers. Finding is grossly unchanged from this previous study. No acute signal abnormalities on diffusion-weighted imaging. Chronic ischemic white matter disease noted with mild volume loss. Midline structures are intact and within normal limits. Normal flow voids are noted within the vascular structures. Sinuses and mastoids are clear. Right ocular prosthesis. Impression: Large irregular enhancing cystic mass on the right with surrounding edema.  Primary considerations would be recurrent malignancy versus abscess Persistent linear enhancement along the meninges anterior left parietal lobe. This document has been electronically signed by: Nicole Downey MD on 05/06/2022 11:54 PM                   Assessment:    Principal Problem:    Brain mass  Active Problems:    Brain metastases ([facility] [address]  Resolved Problems:    * No resolved hospital problems. *        Plan:  -Plan for surgical intervention tomorrow for reexploration of right-sided craniotomy and maximum safe resection of recurrent right sided brain metastatic tumor  I explained for patient and her family the recommended surgical intervention , as well as the associated risks and benefits.  - I discussed the possible complications of surgery again with patient in detail, including excessive blood loss requiring transfusion, infection that may become meningitis, problem with heart lung and kidney as a result of general anesthesia such as heart attack, pneumonia, kidney shutdown and stroke. I also discussed the possible complication of the operations in and around the spine such as, death, paralysis, weakness on one side or the other of the body, decreased sensation or pain on one side or the other of the body, inability to control bowel/bladder, problems with sexual function, postoperative meningitis, postoperative development of a blood clot that may require another operation, leakage of fluid from the wound that may require another operation. The patient understands that no guarantee can be made regarding the extent of post-operative pain or other per op  symptoms relief. .   - All question and concerns were addressed and answered. - Patient elected to proceed with the surgical treatment and she signed the surgical consent.   -NPO at midnight  -MRI of the head with and without contrast with [place] protocol  -CT of the head without contrast with [place] protocol  -Radiation and medical oncology following  -Medical and cardiology clearance for surgical intervention  -Neurosurgery to follow    Electronically signed by PAMELA Mckay CNP on 5/10/2022 at 6:55 AM    Neurosurgery

## 2022-05-10 NOTE — PLAN OF CARE
Problem: Discharge Planning  Goal: Discharge to home or other facility with appropriate resources  Description: Discharge planning in progress. Flowsheets  Taken 5/10/2022 1368 by Gerson Weber RN  Discharge to home or other facility with appropriate resources: Identify barriers to discharge with patient and caregiver    Problem: Pain  Goal: Verbalizes/displays adequate comfort level or baseline comfort level  Description: PRN Norco for pain management. Encouraged to report pain on pain scale. Flowsheets (Taken 5/10/2022 6757)  Verbalizes/displays adequate comfort level or baseline comfort level:   Encourage patient to monitor pain and request assistance   Assess pain using appropriate pain scale     Problem: Safety - Adult  Goal: Free from fall injury  Description: Family at bedside. Bed alarm activated. Flowsheets (Taken 5/10/2022 9563)  Free From Fall Injury:   Instruct family/caregiver on patient safety   Based on caregiver fall risk screen, instruct family/caregiver to ask for assistance with transferring infant if caregiver noted to have fall risk factors     Problem: Chronic Conditions and Co-morbidities  Goal: Patient's chronic conditions and co-morbidity symptoms are monitored and maintained or improved  Description: Monitoring co-morbidities. Flowsheets  Taken 5/10/2022 3284 by Gerson Weber RN  Care Plan - Patient's Chronic Conditions and Co-Morbidity Symptoms are Monitored and Maintained or Improved: Monitor and assess patient's chronic conditions and comorbid symptoms for stability, deterioration, or improvement    Problem: Nutrition Deficit:  Goal: Optimize nutritional status  Description: NPO at this time. Monitoring for nutritional needs.    Flowsheets (Taken 5/10/2022 0303)  Nutrient intake appropriate for improving, restoring, or maintaining nutritional needs:   Assess nutritional status and recommend course of action   Monitor oral intake, labs, and treatment plans     Problem: ABCDS Injury Assessment  Goal: Absence of physical injury  Description: No physical injury noted. Flowsheets (Taken 5/10/2022 9667)  Absence of Physical Injury: Implement safety measures based on patient assessment     Problem: Skin/Tissue Integrity  Goal: Absence of new skin breakdown  Description: 1. Monitor for areas of redness and/or skin breakdown  2. Assess vascular access sites hourly  3. Every 4-6 hours minimum:  Change oxygen saturation probe site  4. Every 4-6 hours:  If on nasal continuous positive airway pressure, respiratory therapy assess nares and determine need for appliance change or resting period. Note: No new breakdown noted. Monitoring.

## 2022-05-10 NOTE — PROGRESS NOTES
CRITICAL CARE PROGRESS NOTE      Patient:  Hero Elliott    Unit/Bed:4D-08/008-A  YOB: 1933  MRN: [id number]   PCP: Mariely Peñaloza MD  Date of Admission: 5/6/2022  Chief Complaint:- headache     Assessment and Plan:         1. Right-sided brain masses: Noted CT head.  MRI shows mass with cerebral edema.  Neurosurgery consulted.  Patient does have history of cancer with metastatic disease.  Maintain systolic blood pressure 602-315.  Neurochecks.  Patient was on chronic aspirin, on hold.  TEG and platelet mapping normal.  Keppra for seizure prophylaxis. 2. Cerebral edema: 3% saline and Decadron.  Goal sodium 145-150, seizure precautions. 3. History of breast cancer: With mets to the liver.  Diagnosed in 2016.  Completed chemotherapy.  She follows with Dr. Jasmine Asencio as outpatient, now seeing Dr. Kayla Cote, Dr. Jasmine Asencio retired   4. History of temporal arteritis: Noted history.  On Decadron.  Prostatic right eye, secondary to vision loss  5. Hypertension: Continue Imdur  6. Hyperlipidemia: Lipitor  7. GERD: PPI  8. Hypothyroidism: Continue Synthroid  9. Anxiety: Zoloft  10. Acute hypoxic respiratory failure:  resolved; Likely secondary to atelectasis.  Mild concern on chest x-ray for new infiltrate.  Patient has no fever no leukocytosis no cough no sputum production.  Increase activity and up to chair.  Incentive spirometr  11. Thrombocytopenia: resolved; Platelets 382, monitor.   No signs of bleeding.     INITIAL H AND P AND ICU COURSE:  Latanya Rod is an 80-year-old white female who presented to [facility] on 5/6/2022 for lower extremity numbness headaches and nausea and vomiting. 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  Per report patient presents that for the last 2 weeks she had had progressive headache she reported 3-4 headaches a week she is not [place] difficulty sleeping she stated that she has had nausea and vomiting that started a few days prior.  She endorsed feeling fatigued.  She denied any dysuria or urgency but did states she had decreased urine output.  In the emergency department labs were normal.  Troponin was negative.  EKG was completed which was negative.  She underwent CT head which showed cerebral edema with a right temporal occipital parietal mass.  Consult to neurosurgery Dr. Ammon Archuleta recommended admission to the ICU for close neurological monitoring.  Patient was started on Decadron and hypertonic saline.  MRI brain was ordered. Emerita Razo was admitted to ICU in stable condition.     Past Medical History: per HPI  Family History: Sister: Breast cancer Brother: Lung cancer, colon cancer  Social History: Reformed smoker, denies alcohol use, denies drug use.     Review of Systems   Constitutional: Negative for fatigue and fever. HENT: Negative for sore throat and trouble swallowing. Eyes: Negative for photophobia. Visual disturbance: chronic. Respiratory: Negative for chest tightness, shortness of breath and wheezing. Cardiovascular: Negative for chest pain and leg swelling. Gastrointestinal: Negative for abdominal pain, nausea and vomiting. Endocrine: Negative for polydipsia and polyphagia. Genitourinary: Negative for decreased urine volume and flank pain. Musculoskeletal: Positive for neck pain. Negative for back pain. Skin: Negative for color change, pallor and rash. Allergic/Immunologic: Negative for food allergies. Neurological: Negative for dizziness, weakness and numbness. Hematological: Negative for adenopathy. Psychiatric/Behavioral: Negative for agitation and confusion. The patient is nervous/anxious.         Scheduled Meds:   levETIRAcetam  500 mg Oral BID    isosorbide mononitrate  30 mg Oral Daily    docusate sodium  100 mg Oral BID    levothyroxine  100 mcg Oral Daily    pantoprazole  40 mg Oral QAM AC    [Held by provider] aspirin  81 mg Oral Daily    sertraline  50 mg Oral BID    atorvastatin  10 mg Oral Nightly    dexamethasone  4 mg Oral 4 times per day    enoxaparin  40 mg SubCUTAneous Daily     Continuous Infusions:   dexmedetomidine Stopped (05/09/22 1153)       PHYSICAL EXAMINATION:  T:  98.5.  P:  62. RR:  18. B/P:  124/58. FiO2:  0. O2 Sat:  91.  I/O:  244/950  Body mass index is 28.12 kg/m². GCS:15  General: Acute on chronically ill-appearing white female  HEENT:  normocephalic and atraumatic. No scleral icterus. PERR  Neck: supple. No Thyromegaly. Lungs: clear to auscultation. No retractions  Cardiac: RRR. No JVD. Abdomen: soft. Nontender. Extremities:  No clubbing, cyanosis, or edema x 4. Vasculature: capillary refill < 3 seconds. Palpable dorsalis pedis pulses. Skin:  warm and dry. Psych:  Alert and oriented x3. Affect appropriate  Lymph:  No supraclavicular adenopathy. Neurologic:  No focal deficit. No seizures. Data: (All radiographs, tracings, PFTs, and imaging are personally viewed and interpreted unless otherwise noted).  Sodium 143, potassium 3.8, chloride 110, CO2 24, BUN 19, creatinine 0.5, anion gap 9.0, glucose 122.  WBC 9.5, hemoglobin 12.3, hematocrit 39.6, platelet count 665   Telemetry shows normal sinus rhythm  · CT head 5/6/2022 reports new right temporal edema.  Cystic mass. · Chest x-ray 5/7/2022 reports questionable new infiltrate or pneumonitis in the right lung base. · MRI 5/6/2022 reports large irregular enhancing cystic mass on the right with surrounding edema.  Considerations would be malignancy versus abscess.   · EKG 5/6/2022 reports normal sinus rhythm  · Echocardiogram 8/17/2021 reports normal EF 65%, normal LV function.     Meets Continued ICU Level Care Criteria:    [x] Yes   [] No - Transfer Planned to listed location:  [] HOSPITALIST CONTACTED-      Case and plan discussed with Dr. DENNIS HOANGΟΛΕΜΙEnriquetaΙAYDE and Dr. Juwan Farrell. Electronically signed by Salma Perez. PAMELA Quintanilla - CNP  CRITICAL CARE SPECIALIST    Patient seen by me including key components of medical care. Case discussed with NP. Adjusting hypertonic saline for cerebral edema. Italicized font, if present,  represents changes to the note made by me. CC time 35 minutes. Time was discontiguous. Time does not include procedure. Time does include my direct assessment of the patient and coordination of care. Time represents more than 50% of the time involved with patient care by the [address] [place] [address] team.  Electronically signed by Ninfa Quezada.  ΚΑTOSHA HOANGΟΛΕΜΙ∆ΙΑ MD.

## 2022-05-10 NOTE — FLOWSHEET NOTE
Pt was in bed as her niece was visiting. She was dealing with brain mass but was optimistic to be well. she was encouraged and prayer appreciated. 05/10/22 1709   Encounter Summary   Service Provided For: Patient and family together   Referral/Consult From: [address] [facility] Family members   Last Encounter  05/10/22   Complexity of Encounter Low   Begin Time 1520   End Time  1528   Total Time Calculated 8 min   Crisis   Type Follow up   Spiritual/Emotional needs   Type Spiritual Support   Assessment/Intervention/Outcome   Assessment Calm; Hopeful   Intervention Empowerment; Active listening;Nurtured Hope   Outcome Acceptance;Encouraged;Peace

## 2022-05-11 ENCOUNTER — APPOINTMENT (OUTPATIENT)
Dept: CT IMAGING | Age: 87
DRG: 025 | End: 2022-05-11
Payer: MEDICARE

## 2022-05-11 ENCOUNTER — APPOINTMENT (OUTPATIENT)
Dept: GENERAL RADIOLOGY | Age: 87
DRG: 025 | End: 2022-05-11
Payer: MEDICARE

## 2022-05-11 ENCOUNTER — ANESTHESIA (OUTPATIENT)
Dept: OPERATING ROOM | Age: 87
DRG: 025 | End: 2022-05-11
Payer: MEDICARE

## 2022-05-11 VITALS — DIASTOLIC BLOOD PRESSURE: 69 MMHG | OXYGEN SATURATION: 93 % | TEMPERATURE: 100.2 F | SYSTOLIC BLOOD PRESSURE: 177 MMHG

## 2022-05-11 LAB
ABO: NORMAL
ANION GAP SERPL CALCULATED.3IONS-SCNC: 9 MEQ/L (ref 8–16)
ANION GAP SERPL CALCULATED.3IONS-SCNC: 9 MEQ/L (ref 8–16)
ANTIBODY SCREEN: NORMAL
BASE EXCESS (CALCULATED): -1.4 MMOL/L (ref -2.5–2.5)
BASE EXCESS (CALCULATED): -2.6 MMOL/L (ref -2.5–2.5)
BASE EXCESS (CALCULATED): 1.8 MMOL/L (ref -2.5–2.5)
BUN BLDV-MCNC: 13 MG/DL (ref 7–22)
BUN BLDV-MCNC: 16 MG/DL (ref 7–22)
CALCIUM IONIZED SERUM: 0.93 MMOL/L (ref 1.12–1.32)
CALCIUM IONIZED SERUM: 1.09 MMOL/L (ref 1.12–1.32)
CALCIUM SERPL-MCNC: 7.6 MG/DL (ref 8.5–10.5)
CALCIUM SERPL-MCNC: 8.2 MG/DL (ref 8.5–10.5)
CHLORIDE BLD-SCNC: 109 MEQ/L (ref 98–111)
CHLORIDE BLD-SCNC: 111 MEQ/L (ref 98–111)
CO2: 24 MEQ/L (ref 23–33)
CO2: 25 MEQ/L (ref 23–33)
COLLECTED BY:: ABNORMAL
COLLECTED BY:: ABNORMAL
COLLECTED BY:: NORMAL
CREAT SERPL-MCNC: 0.5 MG/DL (ref 0.4–1.2)
CREAT SERPL-MCNC: 0.5 MG/DL (ref 0.4–1.2)
ERYTHROCYTE [DISTWIDTH] IN BLOOD BY AUTOMATED COUNT: 14.2 % (ref 11.5–14.5)
ERYTHROCYTE [DISTWIDTH] IN BLOOD BY AUTOMATED COUNT: 47.8 FL (ref 35–45)
GFR SERPL CREATININE-BSD FRML MDRD: > 90 ML/MIN/1.73M2
GFR SERPL CREATININE-BSD FRML MDRD: > 90 ML/MIN/1.73M2
GLUCOSE BLD-MCNC: 135 MG/DL (ref 70–108)
GLUCOSE BLD-MCNC: 147 MG/DL (ref 70–108)
GLUCOSE, WHOLE BLOOD: 111 MG/DL (ref 70–108)
GLUCOSE, WHOLE BLOOD: 85 MG/DL (ref 70–108)
GLUCOSE, WHOLE BLOOD: 95 MG/DL (ref 70–108)
HCO3: 22 MMOL/L (ref 23–28)
HCO3: 23 MMOL/L (ref 23–28)
HCO3: 26 MMOL/L (ref 23–28)
HCT VFR BLD CALC: 40.1 % (ref 37–47)
HEMOGLOBIN: 12.6 GM/DL (ref 12–16)
MAGNESIUM: 1.4 MG/DL (ref 1.6–2.4)
MCH RBC QN AUTO: 28.9 PG (ref 26–33)
MCHC RBC AUTO-ENTMCNC: 31.4 GM/DL (ref 32.2–35.5)
MCV RBC AUTO: 92 FL (ref 81–99)
O2 SATURATION: 100 %
O2 SATURATION: 100 %
O2 SATURATION: NORMAL %
PCO2: 37 MMHG (ref 35–45)
PCO2: 38 MMHG (ref 35–45)
PCO2: 39 MMHG (ref 35–45)
PH BLOOD GAS: 7.38 (ref 7.35–7.45)
PH BLOOD GAS: 7.39 (ref 7.35–7.45)
PH BLOOD GAS: 7.43 (ref 7.35–7.45)
PLATELET # BLD: 148 THOU/MM3 (ref 130–400)
PMV BLD AUTO: 11.5 FL (ref 9.4–12.4)
PO2: 285 MMHG (ref 71–104)
PO2: 392 MMHG (ref 71–104)
PO2: NORMAL MMHG (ref 71–104)
POTASSIUM SERPL-SCNC: 3.7 MEQ/L (ref 3.5–5.2)
POTASSIUM SERPL-SCNC: 3.8 MEQ/L (ref 3.5–5.2)
POTASSIUM, WHOLE BLOOD: 3 MEQ/L (ref 3.5–4.9)
POTASSIUM, WHOLE BLOOD: NORMAL MEQ/L (ref 3.5–4.9)
RBC # BLD: 4.36 MILL/MM3 (ref 4.2–5.4)
REASON FOR REJECTION: NORMAL
REJECTED TEST: NORMAL
RH FACTOR: NORMAL
SODIUM BLD-SCNC: 142 MEQ/L (ref 135–145)
SODIUM BLD-SCNC: 143 MEQ/L (ref 135–145)
SODIUM BLD-SCNC: 144 MEQ/L (ref 135–145)
SODIUM BLD-SCNC: 145 MEQ/L (ref 135–145)
SODIUM, WHOLE BLOOD: 144 MEQ/L (ref 138–146)
SODIUM, WHOLE BLOOD: 146 MEQ/L (ref 138–146)
SODIUM, WHOLE BLOOD: 147 MEQ/L (ref 138–146)
WBC # BLD: 8.5 THOU/MM3 (ref 4.8–10.8)

## 2022-05-11 PROCEDURE — 84295 ASSAY OF SERUM SODIUM: CPT

## 2022-05-11 PROCEDURE — C1763 CONN TISS, NON-HUMAN: HCPCS | Performed by: NEUROLOGICAL SURGERY

## 2022-05-11 PROCEDURE — 6360000002 HC RX W HCPCS: Performed by: NURSE PRACTITIONER

## 2022-05-11 PROCEDURE — 3600000016 HC SURGERY LEVEL 6 ADDTL 15MIN: Performed by: NEUROLOGICAL SURGERY

## 2022-05-11 PROCEDURE — 93005 ELECTROCARDIOGRAM TRACING: CPT | Performed by: NURSE PRACTITIONER

## 2022-05-11 PROCEDURE — 6360000002 HC RX W HCPCS

## 2022-05-11 PROCEDURE — 6370000000 HC RX 637 (ALT 250 FOR IP): Performed by: NURSE PRACTITIONER

## 2022-05-11 PROCEDURE — 36415 COLL VENOUS BLD VENIPUNCTURE: CPT

## 2022-05-11 PROCEDURE — 70450 CT HEAD/BRAIN W/O DYE: CPT

## 2022-05-11 PROCEDURE — 3700000000 HC ANESTHESIA ATTENDED CARE: Performed by: NEUROLOGICAL SURGERY

## 2022-05-11 PROCEDURE — 2500000003 HC RX 250 WO HCPCS

## 2022-05-11 PROCEDURE — 86900 BLOOD TYPING SEROLOGIC ABO: CPT

## 2022-05-11 PROCEDURE — 82803 BLOOD GASES ANY COMBINATION: CPT

## 2022-05-11 PROCEDURE — 7100000001 HC PACU RECOVERY - ADDTL 15 MIN: Performed by: NEUROLOGICAL SURGERY

## 2022-05-11 PROCEDURE — 3700000001 HC ADD 15 MINUTES (ANESTHESIA): Performed by: NEUROLOGICAL SURGERY

## 2022-05-11 PROCEDURE — 84132 ASSAY OF SERUM POTASSIUM: CPT

## 2022-05-11 PROCEDURE — 85027 COMPLETE CBC AUTOMATED: CPT

## 2022-05-11 PROCEDURE — 61781 SCAN PROC CRANIAL INTRA: CPT | Performed by: NEUROLOGICAL SURGERY

## 2022-05-11 PROCEDURE — 83735 ASSAY OF MAGNESIUM: CPT

## 2022-05-11 PROCEDURE — P9045 ALBUMIN (HUMAN), 5%, 250 ML: HCPCS

## 2022-05-11 PROCEDURE — 2709999900 HC NON-CHARGEABLE SUPPLY: Performed by: NEUROLOGICAL SURGERY

## 2022-05-11 PROCEDURE — 86850 RBC ANTIBODY SCREEN: CPT

## 2022-05-11 PROCEDURE — 61518 REMOVAL OF BRAIN LESION: CPT | Performed by: PHYSICIAN ASSISTANT

## 2022-05-11 PROCEDURE — 6370000000 HC RX 637 (ALT 250 FOR IP): Performed by: NEUROLOGICAL SURGERY

## 2022-05-11 PROCEDURE — 88331 PATH CONSLTJ SURG 1 BLK 1SPC: CPT

## 2022-05-11 PROCEDURE — 2780000010 HC IMPLANT OTHER: Performed by: NEUROLOGICAL SURGERY

## 2022-05-11 PROCEDURE — 7100000000 HC PACU RECOVERY - FIRST 15 MIN: Performed by: NEUROLOGICAL SURGERY

## 2022-05-11 PROCEDURE — 80048 BASIC METABOLIC PNL TOTAL CA: CPT

## 2022-05-11 PROCEDURE — 61781 SCAN PROC CRANIAL INTRA: CPT | Performed by: PHYSICIAN ASSISTANT

## 2022-05-11 PROCEDURE — 71045 X-RAY EXAM CHEST 1 VIEW: CPT

## 2022-05-11 PROCEDURE — 2500000003 HC RX 250 WO HCPCS: Performed by: NURSE PRACTITIONER

## 2022-05-11 PROCEDURE — 82330 ASSAY OF CALCIUM: CPT

## 2022-05-11 PROCEDURE — 6360000002 HC RX W HCPCS: Performed by: STUDENT IN AN ORGANIZED HEALTH CARE EDUCATION/TRAINING PROGRAM

## 2022-05-11 PROCEDURE — 2580000003 HC RX 258

## 2022-05-11 PROCEDURE — 2000000000 HC ICU R&B

## 2022-05-11 PROCEDURE — 6360000002 HC RX W HCPCS: Performed by: NEUROLOGICAL SURGERY

## 2022-05-11 PROCEDURE — 86901 BLOOD TYPING SEROLOGIC RH(D): CPT

## 2022-05-11 PROCEDURE — 88342 IMHCHEM/IMCYTCHM 1ST ANTB: CPT

## 2022-05-11 PROCEDURE — 82947 ASSAY GLUCOSE BLOOD QUANT: CPT

## 2022-05-11 PROCEDURE — 2580000003 HC RX 258: Performed by: NEUROLOGICAL SURGERY

## 2022-05-11 PROCEDURE — 3600000006 HC SURGERY LEVEL 6 BASE: Performed by: NEUROLOGICAL SURGERY

## 2022-05-11 PROCEDURE — 61518 REMOVAL OF BRAIN LESION: CPT | Performed by: NEUROLOGICAL SURGERY

## 2022-05-11 PROCEDURE — 88307 TISSUE EXAM BY PATHOLOGIST: CPT

## 2022-05-11 PROCEDURE — 2720000010 HC SURG SUPPLY STERILE: Performed by: NEUROLOGICAL SURGERY

## 2022-05-11 DEVICE — INTEGRA® BOVINE PERICARDIUM DURAL GRAFT IS A NATURAL, RESORBABLE, COLLAGENOUS DEVICE FOR USE IN THE REPAIR OF DURA MATER. THE DEVICE IS DERIVED FROM BOVINE PERICARDIUM AND IS MANUFACTURED USING A MULTISTEP PROPRIETARY PROCESS WHICH GENTLY CLEANS AND DEHYDRATES THE TISSUE. THE DEVICE IS TERMINALLY STERILIZED BY GAMMA IRRADIATION. THE DEVICE MUST BE SUTURED IN PLACE.
Type: IMPLANTABLE DEVICE | Site: BRAIN | Status: FUNCTIONAL
Brand: INTEGRA® BOVINE PERICARDIUM DURAL GRAFT

## 2022-05-11 DEVICE — DURAGEN® SUTURABLE DURAL REGENERATION MATRIX, 3 IN X 3 IN (7.5 CM X 7.5 CM)
Type: IMPLANTABLE DEVICE | Site: CRANIAL | Status: FUNCTIONAL
Brand: DURAGEN® SUTURABLE

## 2022-05-11 RX ORDER — GLYCOPYRROLATE 1 MG/5 ML
SYRINGE (ML) INTRAVENOUS PRN
Status: DISCONTINUED | OUTPATIENT
Start: 2022-05-11 | End: 2022-05-11 | Stop reason: SDUPTHER

## 2022-05-11 RX ORDER — SODIUM CHLORIDE, SODIUM LACTATE, POTASSIUM CHLORIDE, CALCIUM CHLORIDE 600; 310; 30; 20 MG/100ML; MG/100ML; MG/100ML; MG/100ML
INJECTION, SOLUTION INTRAVENOUS CONTINUOUS PRN
Status: DISCONTINUED | OUTPATIENT
Start: 2022-05-11 | End: 2022-05-11 | Stop reason: SDUPTHER

## 2022-05-11 RX ORDER — PHENYLEPHRINE HYDROCHLORIDE 10 MG/ML
INJECTION INTRAVENOUS PRN
Status: DISCONTINUED | OUTPATIENT
Start: 2022-05-11 | End: 2022-05-11 | Stop reason: SDUPTHER

## 2022-05-11 RX ORDER — ALBUMIN, HUMAN INJ 5% 5 %
SOLUTION INTRAVENOUS PRN
Status: DISCONTINUED | OUTPATIENT
Start: 2022-05-11 | End: 2022-05-11 | Stop reason: SDUPTHER

## 2022-05-11 RX ORDER — MANNITOL 20 G/100ML
INJECTION, SOLUTION INTRAVENOUS PRN
Status: DISCONTINUED | OUTPATIENT
Start: 2022-05-11 | End: 2022-05-11 | Stop reason: SDUPTHER

## 2022-05-11 RX ORDER — HYDRALAZINE HYDROCHLORIDE 20 MG/ML
10 INJECTION INTRAMUSCULAR; INTRAVENOUS EVERY 6 HOURS PRN
Status: DISCONTINUED | OUTPATIENT
Start: 2022-05-11 | End: 2022-05-19 | Stop reason: HOSPADM

## 2022-05-11 RX ORDER — TRANEXAMIC ACID 100 MG/ML
INJECTION, SOLUTION INTRAVENOUS PRN
Status: DISCONTINUED | OUTPATIENT
Start: 2022-05-11 | End: 2022-05-11 | Stop reason: SDUPTHER

## 2022-05-11 RX ORDER — FENTANYL CITRATE 50 UG/ML
25 INJECTION, SOLUTION INTRAMUSCULAR; INTRAVENOUS
Status: DISCONTINUED | OUTPATIENT
Start: 2022-05-11 | End: 2022-05-19 | Stop reason: HOSPADM

## 2022-05-11 RX ORDER — METOPROLOL TARTRATE 5 MG/5ML
5 INJECTION INTRAVENOUS ONCE
Status: COMPLETED | OUTPATIENT
Start: 2022-05-11 | End: 2022-05-11

## 2022-05-11 RX ORDER — FENTANYL CITRATE 50 UG/ML
50 INJECTION, SOLUTION INTRAMUSCULAR; INTRAVENOUS EVERY 5 MIN PRN
Status: DISCONTINUED | OUTPATIENT
Start: 2022-05-11 | End: 2022-05-11 | Stop reason: HOSPADM

## 2022-05-11 RX ORDER — DEXAMETHASONE SODIUM PHOSPHATE 10 MG/ML
INJECTION, EMULSION INTRAMUSCULAR; INTRAVENOUS PRN
Status: DISCONTINUED | OUTPATIENT
Start: 2022-05-11 | End: 2022-05-11 | Stop reason: SDUPTHER

## 2022-05-11 RX ORDER — 3% SODIUM CHLORIDE 3 G/100ML
35 INJECTION, SOLUTION INTRAVENOUS CONTINUOUS
Status: DISCONTINUED | OUTPATIENT
Start: 2022-05-11 | End: 2022-05-12

## 2022-05-11 RX ORDER — FENTANYL CITRATE 50 UG/ML
INJECTION, SOLUTION INTRAMUSCULAR; INTRAVENOUS
Status: COMPLETED
Start: 2022-05-11 | End: 2022-05-11

## 2022-05-11 RX ORDER — FENTANYL CITRATE 50 UG/ML
50 INJECTION, SOLUTION INTRAMUSCULAR; INTRAVENOUS
Status: DISCONTINUED | OUTPATIENT
Start: 2022-05-11 | End: 2022-05-19 | Stop reason: HOSPADM

## 2022-05-11 RX ORDER — SODIUM CHLORIDE 9 MG/ML
INJECTION, SOLUTION INTRAVENOUS CONTINUOUS PRN
Status: DISCONTINUED | OUTPATIENT
Start: 2022-05-11 | End: 2022-05-11 | Stop reason: SDUPTHER

## 2022-05-11 RX ORDER — PROPOFOL 10 MG/ML
INJECTION, EMULSION INTRAVENOUS PRN
Status: DISCONTINUED | OUTPATIENT
Start: 2022-05-11 | End: 2022-05-11 | Stop reason: SDUPTHER

## 2022-05-11 RX ORDER — METOPROLOL TARTRATE 5 MG/5ML
INJECTION INTRAVENOUS
Status: COMPLETED
Start: 2022-05-11 | End: 2022-05-11

## 2022-05-11 RX ORDER — CALCIUM CHLORIDE 100 MG/ML
INJECTION INTRAVENOUS; INTRAVENTRICULAR PRN
Status: DISCONTINUED | OUTPATIENT
Start: 2022-05-11 | End: 2022-05-11 | Stop reason: SDUPTHER

## 2022-05-11 RX ORDER — LIDOCAINE HCL/PF 100 MG/5ML
SYRINGE (ML) INJECTION PRN
Status: DISCONTINUED | OUTPATIENT
Start: 2022-05-11 | End: 2022-05-11 | Stop reason: SDUPTHER

## 2022-05-11 RX ORDER — DILTIAZEM HYDROCHLORIDE 5 MG/ML
16 INJECTION INTRAVENOUS ONCE
Status: DISCONTINUED | OUTPATIENT
Start: 2022-05-11 | End: 2022-05-11 | Stop reason: RX

## 2022-05-11 RX ORDER — POTASSIUM CHLORIDE 29.8 MG/ML
INJECTION INTRAVENOUS PRN
Status: DISCONTINUED | OUTPATIENT
Start: 2022-05-11 | End: 2022-05-11 | Stop reason: SDUPTHER

## 2022-05-11 RX ORDER — ROCURONIUM BROMIDE 10 MG/ML
INJECTION, SOLUTION INTRAVENOUS PRN
Status: DISCONTINUED | OUTPATIENT
Start: 2022-05-11 | End: 2022-05-11 | Stop reason: SDUPTHER

## 2022-05-11 RX ORDER — FENTANYL CITRATE 50 UG/ML
INJECTION, SOLUTION INTRAMUSCULAR; INTRAVENOUS PRN
Status: DISCONTINUED | OUTPATIENT
Start: 2022-05-11 | End: 2022-05-11 | Stop reason: SDUPTHER

## 2022-05-11 RX ORDER — CEFAZOLIN SODIUM 1 G/3ML
INJECTION, POWDER, FOR SOLUTION INTRAMUSCULAR; INTRAVENOUS PRN
Status: DISCONTINUED | OUTPATIENT
Start: 2022-05-11 | End: 2022-05-11 | Stop reason: SDUPTHER

## 2022-05-11 RX ORDER — LABETALOL HYDROCHLORIDE 5 MG/ML
INJECTION, SOLUTION INTRAVENOUS PRN
Status: DISCONTINUED | OUTPATIENT
Start: 2022-05-11 | End: 2022-05-11 | Stop reason: SDUPTHER

## 2022-05-11 RX ORDER — ONDANSETRON 2 MG/ML
INJECTION INTRAMUSCULAR; INTRAVENOUS PRN
Status: DISCONTINUED | OUTPATIENT
Start: 2022-05-11 | End: 2022-05-11 | Stop reason: SDUPTHER

## 2022-05-11 RX ORDER — GINSENG 100 MG
CAPSULE ORAL PRN
Status: DISCONTINUED | OUTPATIENT
Start: 2022-05-11 | End: 2022-05-11 | Stop reason: ALTCHOICE

## 2022-05-11 RX ORDER — HALOPERIDOL 5 MG/ML
2 INJECTION INTRAMUSCULAR EVERY 30 MIN PRN
Status: DISCONTINUED | OUTPATIENT
Start: 2022-05-11 | End: 2022-05-12

## 2022-05-11 RX ADMIN — FENTANYL CITRATE 50 MCG: 50 INJECTION, SOLUTION INTRAMUSCULAR; INTRAVENOUS at 14:38

## 2022-05-11 RX ADMIN — DEXAMETHASONE SODIUM PHOSPHATE 8 MG: 10 INJECTION, EMULSION INTRAMUSCULAR; INTRAVENOUS at 08:57

## 2022-05-11 RX ADMIN — ALBUMIN (HUMAN) 250 ML: 12.5 SOLUTION INTRAVENOUS at 13:02

## 2022-05-11 RX ADMIN — LABETALOL HYDROCHLORIDE 20 MG: 5 INJECTION INTRAVENOUS at 14:48

## 2022-05-11 RX ADMIN — ISOSORBIDE MONONITRATE 30 MG: 30 TABLET, EXTENDED RELEASE ORAL at 17:31

## 2022-05-11 RX ADMIN — ROCURONIUM BROMIDE 20 MG: 50 INJECTION, SOLUTION INTRAVENOUS at 11:35

## 2022-05-11 RX ADMIN — FENTANYL CITRATE 50 MCG: 50 INJECTION, SOLUTION INTRAMUSCULAR; INTRAVENOUS at 17:17

## 2022-05-11 RX ADMIN — CALCIUM CHLORIDE 0.2 G: 100 INJECTION, SOLUTION INTRAVENOUS at 12:15

## 2022-05-11 RX ADMIN — CEFAZOLIN SODIUM 2000 MG: 1 INJECTION, POWDER, FOR SOLUTION INTRAMUSCULAR; INTRAVENOUS at 08:48

## 2022-05-11 RX ADMIN — HYDRALAZINE HYDROCHLORIDE 5 MG: 20 INJECTION, SOLUTION INTRAMUSCULAR; INTRAVENOUS at 01:45

## 2022-05-11 RX ADMIN — SUGAMMADEX 130 MG: 100 INJECTION, SOLUTION INTRAVENOUS at 12:52

## 2022-05-11 RX ADMIN — PHENYLEPHRINE HYDROCHLORIDE 15 MCG/MIN: 10 INJECTION INTRAVENOUS at 08:44

## 2022-05-11 RX ADMIN — METOPROLOL TARTRATE 5 MG: 1 INJECTION, SOLUTION INTRAVENOUS at 20:48

## 2022-05-11 RX ADMIN — PROPOFOL 50 MG: 10 INJECTION, EMULSION INTRAVENOUS at 09:07

## 2022-05-11 RX ADMIN — POTASSIUM CHLORIDE 10 MEQ: 400 INJECTION, SOLUTION INTRAVENOUS at 12:00

## 2022-05-11 RX ADMIN — CALCIUM CHLORIDE 0.2 G: 100 INJECTION, SOLUTION INTRAVENOUS at 12:24

## 2022-05-11 RX ADMIN — METOPROLOL TARTRATE 5 MG: 5 INJECTION INTRAVENOUS at 20:48

## 2022-05-11 RX ADMIN — LABETALOL HYDROCHLORIDE 10 MG: 5 INJECTION INTRAVENOUS at 14:08

## 2022-05-11 RX ADMIN — CALCIUM CHLORIDE 0.2 G: 100 INJECTION, SOLUTION INTRAVENOUS at 11:40

## 2022-05-11 RX ADMIN — SERTRALINE 50 MG: 50 TABLET, FILM COATED ORAL at 00:14

## 2022-05-11 RX ADMIN — FENTANYL CITRATE 50 MCG: 50 INJECTION, SOLUTION INTRAMUSCULAR; INTRAVENOUS at 09:07

## 2022-05-11 RX ADMIN — FENTANYL CITRATE 50 MCG: 50 INJECTION, SOLUTION INTRAMUSCULAR; INTRAVENOUS at 14:52

## 2022-05-11 RX ADMIN — FENTANYL CITRATE 50 MCG: 50 INJECTION, SOLUTION INTRAMUSCULAR; INTRAVENOUS at 16:00

## 2022-05-11 RX ADMIN — ROCURONIUM BROMIDE 20 MG: 50 INJECTION, SOLUTION INTRAVENOUS at 09:34

## 2022-05-11 RX ADMIN — SODIUM CHLORIDE, POTASSIUM CHLORIDE, SODIUM LACTATE AND CALCIUM CHLORIDE: 600; 310; 30; 20 INJECTION, SOLUTION INTRAVENOUS at 08:35

## 2022-05-11 RX ADMIN — PHENYLEPHRINE HYDROCHLORIDE 100 MCG: 10 INJECTION INTRAVENOUS at 08:21

## 2022-05-11 RX ADMIN — FENTANYL CITRATE 100 MCG: 50 INJECTION, SOLUTION INTRAMUSCULAR; INTRAVENOUS at 08:18

## 2022-05-11 RX ADMIN — CALCIUM CHLORIDE 0.2 G: 100 INJECTION, SOLUTION INTRAVENOUS at 12:03

## 2022-05-11 RX ADMIN — ALBUMIN (HUMAN) 250 ML: 12.5 SOLUTION INTRAVENOUS at 09:47

## 2022-05-11 RX ADMIN — ROCURONIUM BROMIDE 50 MG: 50 INJECTION, SOLUTION INTRAVENOUS at 07:52

## 2022-05-11 RX ADMIN — ONDANSETRON 4 MG: 2 INJECTION INTRAMUSCULAR; INTRAVENOUS at 12:10

## 2022-05-11 RX ADMIN — PHENYLEPHRINE HYDROCHLORIDE 100 MCG: 10 INJECTION INTRAVENOUS at 09:20

## 2022-05-11 RX ADMIN — LABETALOL HYDROCHLORIDE 10 MG: 5 INJECTION INTRAVENOUS at 14:00

## 2022-05-11 RX ADMIN — PHENYLEPHRINE HYDROCHLORIDE 100 MCG: 10 INJECTION INTRAVENOUS at 10:09

## 2022-05-11 RX ADMIN — SODIUM CHLORIDE: 9 INJECTION, SOLUTION INTRAVENOUS at 07:20

## 2022-05-11 RX ADMIN — FENTANYL CITRATE 50 MCG: 50 INJECTION, SOLUTION INTRAMUSCULAR; INTRAVENOUS at 21:12

## 2022-05-11 RX ADMIN — HYDRALAZINE HYDROCHLORIDE 10 MG: 20 INJECTION, SOLUTION INTRAMUSCULAR; INTRAVENOUS at 18:04

## 2022-05-11 RX ADMIN — Medication 0.1 MG: at 11:01

## 2022-05-11 RX ADMIN — FENTANYL CITRATE 50 MCG: 50 INJECTION, SOLUTION INTRAMUSCULAR; INTRAVENOUS at 13:30

## 2022-05-11 RX ADMIN — Medication 60 MG: at 14:08

## 2022-05-11 RX ADMIN — MICONAZOLE NITRATE: 20 POWDER TOPICAL at 00:13

## 2022-05-11 RX ADMIN — SODIUM CHLORIDE, POTASSIUM CHLORIDE, SODIUM LACTATE AND CALCIUM CHLORIDE: 600; 310; 30; 20 INJECTION, SOLUTION INTRAVENOUS at 12:10

## 2022-05-11 RX ADMIN — TRANEXAMIC ACID 1000 MG: 100 INJECTION, SOLUTION INTRAVENOUS at 08:33

## 2022-05-11 RX ADMIN — FENTANYL CITRATE 50 MCG: 50 INJECTION, SOLUTION INTRAMUSCULAR; INTRAVENOUS at 07:52

## 2022-05-11 RX ADMIN — LEVETIRACETAM 500 MG: 100 INJECTION, SOLUTION, CONCENTRATE INTRAVENOUS at 09:20

## 2022-05-11 RX ADMIN — CALCIUM CHLORIDE 0.2 G: 100 INJECTION, SOLUTION INTRAVENOUS at 11:52

## 2022-05-11 RX ADMIN — POTASSIUM CHLORIDE 10 MEQ: 400 INJECTION, SOLUTION INTRAVENOUS at 12:30

## 2022-05-11 RX ADMIN — PROPOFOL 10 MG: 10 INJECTION, EMULSION INTRAVENOUS at 07:52

## 2022-05-11 RX ADMIN — Medication 60 MG: at 07:52

## 2022-05-11 RX ADMIN — CEFAZOLIN SODIUM 2000 MG: 1 INJECTION, POWDER, FOR SOLUTION INTRAMUSCULAR; INTRAVENOUS at 12:44

## 2022-05-11 RX ADMIN — Medication 0.2 MG: at 09:22

## 2022-05-11 RX ADMIN — DEXAMETHASONE 4 MG: 4 TABLET ORAL at 17:31

## 2022-05-11 RX ADMIN — HALOPERIDOL LACTATE 2 MG: 5 INJECTION, SOLUTION INTRAMUSCULAR at 04:36

## 2022-05-11 RX ADMIN — FENTANYL CITRATE 25 MCG: 50 INJECTION, SOLUTION INTRAMUSCULAR; INTRAVENOUS at 14:06

## 2022-05-11 RX ADMIN — PHENYLEPHRINE HYDROCHLORIDE 200 MCG: 10 INJECTION INTRAVENOUS at 08:06

## 2022-05-11 RX ADMIN — FENTANYL CITRATE 25 MCG: 50 INJECTION, SOLUTION INTRAMUSCULAR; INTRAVENOUS at 14:00

## 2022-05-11 RX ADMIN — MANNITOL 30 G: 20 INJECTION, SOLUTION INTRAVENOUS at 10:22

## 2022-05-11 RX ADMIN — PHENYLEPHRINE HYDROCHLORIDE 100 MCG: 10 INJECTION INTRAVENOUS at 07:56

## 2022-05-11 ASSESSMENT — PULMONARY FUNCTION TESTS
PIF_VALUE: 3
PIF_VALUE: 2
PIF_VALUE: 15
PIF_VALUE: 21
PIF_VALUE: 21
PIF_VALUE: 19
PIF_VALUE: 1
PIF_VALUE: 17
PIF_VALUE: 19
PIF_VALUE: 19
PIF_VALUE: 18
PIF_VALUE: 19
PIF_VALUE: 20
PIF_VALUE: 19
PIF_VALUE: 3
PIF_VALUE: 15
PIF_VALUE: 22
PIF_VALUE: 23
PIF_VALUE: 20
PIF_VALUE: 19
PIF_VALUE: 2
PIF_VALUE: 21
PIF_VALUE: 20
PIF_VALUE: 3
PIF_VALUE: 20
PIF_VALUE: 22
PIF_VALUE: 1
PIF_VALUE: 19
PIF_VALUE: 20
PIF_VALUE: 19
PIF_VALUE: 19
PIF_VALUE: 16
PIF_VALUE: 19
PIF_VALUE: 21
PIF_VALUE: 18
PIF_VALUE: 16
PIF_VALUE: 20
PIF_VALUE: 17
PIF_VALUE: 18
PIF_VALUE: 23
PIF_VALUE: 21
PIF_VALUE: 0
PIF_VALUE: 3
PIF_VALUE: 18
PIF_VALUE: 15
PIF_VALUE: 18
PIF_VALUE: 18
PIF_VALUE: 20
PIF_VALUE: 19
PIF_VALUE: 27
PIF_VALUE: 19
PIF_VALUE: 18
PIF_VALUE: 19
PIF_VALUE: 17
PIF_VALUE: 20
PIF_VALUE: 1
PIF_VALUE: 18
PIF_VALUE: 1
PIF_VALUE: 22
PIF_VALUE: 17
PIF_VALUE: 3
PIF_VALUE: 0
PIF_VALUE: 0
PIF_VALUE: 19
PIF_VALUE: 21
PIF_VALUE: 16
PIF_VALUE: 18
PIF_VALUE: 17
PIF_VALUE: 19
PIF_VALUE: 20
PIF_VALUE: 20
PIF_VALUE: 19
PIF_VALUE: 19
PIF_VALUE: 3
PIF_VALUE: 16
PIF_VALUE: 3
PIF_VALUE: 20
PIF_VALUE: 19
PIF_VALUE: 3
PIF_VALUE: 3
PIF_VALUE: 1
PIF_VALUE: 26
PIF_VALUE: 17
PIF_VALUE: 19
PIF_VALUE: 23
PIF_VALUE: 21
PIF_VALUE: 19
PIF_VALUE: 20
PIF_VALUE: 17
PIF_VALUE: 16
PIF_VALUE: 3
PIF_VALUE: 16
PIF_VALUE: 17
PIF_VALUE: 17
PIF_VALUE: 21
PIF_VALUE: 20
PIF_VALUE: 18
PIF_VALUE: 20
PIF_VALUE: 18
PIF_VALUE: 17
PIF_VALUE: 17
PIF_VALUE: 25
PIF_VALUE: 19
PIF_VALUE: 19
PIF_VALUE: 22
PIF_VALUE: 19
PIF_VALUE: 21
PIF_VALUE: 4
PIF_VALUE: 19
PIF_VALUE: 16
PIF_VALUE: 19
PIF_VALUE: 23
PIF_VALUE: 18
PIF_VALUE: 18
PIF_VALUE: 15
PIF_VALUE: 4
PIF_VALUE: 20
PIF_VALUE: 23
PIF_VALUE: 21
PIF_VALUE: 20
PIF_VALUE: 1
PIF_VALUE: 18
PIF_VALUE: 20
PIF_VALUE: 19
PIF_VALUE: 21
PIF_VALUE: 21
PIF_VALUE: 19
PIF_VALUE: 4
PIF_VALUE: 20
PIF_VALUE: 19
PIF_VALUE: 17
PIF_VALUE: 0
PIF_VALUE: 15
PIF_VALUE: 16
PIF_VALUE: 18
PIF_VALUE: 24
PIF_VALUE: 18
PIF_VALUE: 17
PIF_VALUE: 19
PIF_VALUE: 16
PIF_VALUE: 21
PIF_VALUE: 18
PIF_VALUE: 19
PIF_VALUE: 15
PIF_VALUE: 3
PIF_VALUE: 3
PIF_VALUE: 17
PIF_VALUE: 19
PIF_VALUE: 18
PIF_VALUE: 23
PIF_VALUE: 2
PIF_VALUE: 19
PIF_VALUE: 18
PIF_VALUE: 19
PIF_VALUE: 20
PIF_VALUE: 19
PIF_VALUE: 19
PIF_VALUE: 1
PIF_VALUE: 2
PIF_VALUE: 20
PIF_VALUE: 3
PIF_VALUE: 2
PIF_VALUE: 19
PIF_VALUE: 17
PIF_VALUE: 20
PIF_VALUE: 3
PIF_VALUE: 23
PIF_VALUE: 17
PIF_VALUE: 19
PIF_VALUE: 4
PIF_VALUE: 20
PIF_VALUE: 17
PIF_VALUE: 17
PIF_VALUE: 21
PIF_VALUE: 19
PIF_VALUE: 17
PIF_VALUE: 19
PIF_VALUE: 17
PIF_VALUE: 15
PIF_VALUE: 22
PIF_VALUE: 23
PIF_VALUE: 18
PIF_VALUE: 15
PIF_VALUE: 19
PIF_VALUE: 18
PIF_VALUE: 20
PIF_VALUE: 18
PIF_VALUE: 22
PIF_VALUE: 17
PIF_VALUE: 23
PIF_VALUE: 23
PIF_VALUE: 17
PIF_VALUE: 18
PIF_VALUE: 2
PIF_VALUE: 3
PIF_VALUE: 20
PIF_VALUE: 19
PIF_VALUE: 20
PIF_VALUE: 23
PIF_VALUE: 21
PIF_VALUE: 19
PIF_VALUE: 3
PIF_VALUE: 23
PIF_VALUE: 21
PIF_VALUE: 19
PIF_VALUE: 3
PIF_VALUE: 1
PIF_VALUE: 3
PIF_VALUE: 22
PIF_VALUE: 16
PIF_VALUE: 3
PIF_VALUE: 20
PIF_VALUE: 24
PIF_VALUE: 17
PIF_VALUE: 3
PIF_VALUE: 19
PIF_VALUE: 19
PIF_VALUE: 17
PIF_VALUE: 18
PIF_VALUE: 18
PIF_VALUE: 16
PIF_VALUE: 19
PIF_VALUE: 17
PIF_VALUE: 17
PIF_VALUE: 16
PIF_VALUE: 2
PIF_VALUE: 0
PIF_VALUE: 18
PIF_VALUE: 21
PIF_VALUE: 17
PIF_VALUE: 0
PIF_VALUE: 16
PIF_VALUE: 15
PIF_VALUE: 19
PIF_VALUE: 18
PIF_VALUE: 1
PIF_VALUE: 19
PIF_VALUE: 17
PIF_VALUE: 21
PIF_VALUE: 3
PIF_VALUE: 18
PIF_VALUE: 18
PIF_VALUE: 15
PIF_VALUE: 20
PIF_VALUE: 20
PIF_VALUE: 17
PIF_VALUE: 19
PIF_VALUE: 18
PIF_VALUE: 16
PIF_VALUE: 23
PIF_VALUE: 16
PIF_VALUE: 18
PIF_VALUE: 19
PIF_VALUE: 18
PIF_VALUE: 3
PIF_VALUE: 3
PIF_VALUE: 23
PIF_VALUE: 23
PIF_VALUE: 16
PIF_VALUE: 19
PIF_VALUE: 21
PIF_VALUE: 19
PIF_VALUE: 18
PIF_VALUE: 18
PIF_VALUE: 3
PIF_VALUE: 17
PIF_VALUE: 17
PIF_VALUE: 19
PIF_VALUE: 17
PIF_VALUE: 19
PIF_VALUE: 18
PIF_VALUE: 20
PIF_VALUE: 23
PIF_VALUE: 15
PIF_VALUE: 1
PIF_VALUE: 15
PIF_VALUE: 15
PIF_VALUE: 3
PIF_VALUE: 20
PIF_VALUE: 21
PIF_VALUE: 1
PIF_VALUE: 21
PIF_VALUE: 19
PIF_VALUE: 2
PIF_VALUE: 17
PIF_VALUE: 17
PIF_VALUE: 19
PIF_VALUE: 3
PIF_VALUE: 19
PIF_VALUE: 22
PIF_VALUE: 3
PIF_VALUE: 23
PIF_VALUE: 19
PIF_VALUE: 20
PIF_VALUE: 3
PIF_VALUE: 19
PIF_VALUE: 20
PIF_VALUE: 18
PIF_VALUE: 3
PIF_VALUE: 20
PIF_VALUE: 19
PIF_VALUE: 21
PIF_VALUE: 3
PIF_VALUE: 11
PIF_VALUE: 4
PIF_VALUE: 3
PIF_VALUE: 18
PIF_VALUE: 17
PIF_VALUE: 15
PIF_VALUE: 17
PIF_VALUE: 20
PIF_VALUE: 19
PIF_VALUE: 21
PIF_VALUE: 2
PIF_VALUE: 20
PIF_VALUE: 20
PIF_VALUE: 19
PIF_VALUE: 19
PIF_VALUE: 16
PIF_VALUE: 22
PIF_VALUE: 17
PIF_VALUE: 19
PIF_VALUE: 4
PIF_VALUE: 15
PIF_VALUE: 17
PIF_VALUE: 24
PIF_VALUE: 23
PIF_VALUE: 17
PIF_VALUE: 0
PIF_VALUE: 3
PIF_VALUE: 2
PIF_VALUE: 19
PIF_VALUE: 15
PIF_VALUE: 20
PIF_VALUE: 3
PIF_VALUE: 1
PIF_VALUE: 18
PIF_VALUE: 18
PIF_VALUE: 3
PIF_VALUE: 19
PIF_VALUE: 18
PIF_VALUE: 18
PIF_VALUE: 24
PIF_VALUE: 24
PIF_VALUE: 19
PIF_VALUE: 15
PIF_VALUE: 19
PIF_VALUE: 1
PIF_VALUE: 23
PIF_VALUE: 17
PIF_VALUE: 19
PIF_VALUE: 18
PIF_VALUE: 3
PIF_VALUE: 18
PIF_VALUE: 21
PIF_VALUE: 17
PIF_VALUE: 1
PIF_VALUE: 20
PIF_VALUE: 18
PIF_VALUE: 18
PIF_VALUE: 2
PIF_VALUE: 16
PIF_VALUE: 16
PIF_VALUE: 15
PIF_VALUE: 17
PIF_VALUE: 19
PIF_VALUE: 18
PIF_VALUE: 18
PIF_VALUE: 17
PIF_VALUE: 15
PIF_VALUE: 17
PIF_VALUE: 21
PIF_VALUE: 18
PIF_VALUE: 18
PIF_VALUE: 23
PIF_VALUE: 3
PIF_VALUE: 15
PIF_VALUE: 20
PIF_VALUE: 17
PIF_VALUE: 19
PIF_VALUE: 17
PIF_VALUE: 24
PIF_VALUE: 2
PIF_VALUE: 19
PIF_VALUE: 22
PIF_VALUE: 18
PIF_VALUE: 19
PIF_VALUE: 17
PIF_VALUE: 3
PIF_VALUE: 0
PIF_VALUE: 19
PIF_VALUE: 17
PIF_VALUE: 20
PIF_VALUE: 2
PIF_VALUE: 19
PIF_VALUE: 24
PIF_VALUE: 19
PIF_VALUE: 19
PIF_VALUE: 17
PIF_VALUE: 16
PIF_VALUE: 18
PIF_VALUE: 19
PIF_VALUE: 19
PIF_VALUE: 21
PIF_VALUE: 0
PIF_VALUE: 20
PIF_VALUE: 19
PIF_VALUE: 14
PIF_VALUE: 19
PIF_VALUE: 20
PIF_VALUE: 4
PIF_VALUE: 19
PIF_VALUE: 19
PIF_VALUE: 20
PIF_VALUE: 20
PIF_VALUE: 21
PIF_VALUE: 18
PIF_VALUE: 20
PIF_VALUE: 19
PIF_VALUE: 18
PIF_VALUE: 18
PIF_VALUE: 17
PIF_VALUE: 19
PIF_VALUE: 0

## 2022-05-11 ASSESSMENT — PAIN DESCRIPTION - LOCATION
LOCATION: HEAD;CHEST
LOCATION: SHOULDER
LOCATION: SHOULDER

## 2022-05-11 ASSESSMENT — PAIN SCALES - WONG BAKER
WONGBAKER_NUMERICALRESPONSE: 0

## 2022-05-11 ASSESSMENT — PAIN SCALES - GENERAL
PAINLEVEL_OUTOF10: 2
PAINLEVEL_OUTOF10: 7
PAINLEVEL_OUTOF10: 3
PAINLEVEL_OUTOF10: 10
PAINLEVEL_OUTOF10: 0
PAINLEVEL_OUTOF10: 0
PAINLEVEL_OUTOF10: 10
PAINLEVEL_OUTOF10: 0
PAINLEVEL_OUTOF10: 10
PAINLEVEL_OUTOF10: 0

## 2022-05-11 ASSESSMENT — PAIN DESCRIPTION - ORIENTATION: ORIENTATION: RIGHT

## 2022-05-11 ASSESSMENT — PAIN DESCRIPTION - DESCRIPTORS
DESCRIPTORS: ACHING
DESCRIPTORS: ACHING

## 2022-05-11 NOTE — SIGNIFICANT EVENT
Patient has been restless intermittently thru the evening and redirectable. Patient would swing legs across side railings. Patient requesting cigarette and a \"nip \". She became very agitated. Yelling and verbalizing \"I want to go home \". Patient continued to escalate with agitation and restlessness and yelling. Patient demonstrating paranoid behavior. Answering questions appropriately, speech is clear, and moving all extremities well. Haldol 2 mg IV x1 was given.

## 2022-05-11 NOTE — ANESTHESIA PROCEDURE NOTES
Arterial Line:    An arterial line was placed using surface landmarks, in the OR for the following indication(s): continuous blood pressure monitoring and blood sampling needed. A 20 gauge (size), (length), Arrow (type) catheter was placed, Seldinger technique used, into the left radial artery, secured by tape and Tegaderm. Anesthesia type: General    Events:  patient tolerated procedure well with no complications.   Anesthesiologist: Mary Roman MD  Performed: Anesthesiologist   Preanesthetic Checklist  Completed: patient identified, IV checked, site marked, risks and benefits discussed, surgical consent, monitors and equipment checked, pre-op evaluation, timeout performed, anesthesia consent given, oxygen available and patient being monitored

## 2022-05-11 NOTE — ANESTHESIA PRE PROCEDURE
Department of Anesthesiology  Preprocedure Note       Name:  Olena Mariano   Age:  80 y.o.  :  1933                                          MRN:  [id number]         Date:  2022      Surgeon: Matthew Moody):  Celestina Almendarez MD    Procedure: Procedure(s):  RIGHT SIDED CRANIOTOMY FOR TUMOR    Medications prior to admission:   Prior to Admission medications    Medication Sig Start Date End Date Taking?  Authorizing Provider   ciprofloxacin (CIPRO) 500 MG tablet Take 1 tablet by mouth 2 times daily for 7 days 22  Sekou Benitez MD   levothyroxine (SYNTHROID) 100 MCG tablet take 1 tablet by mouth once daily 22   Ranjith Santos MD   ALPRAZolam Diane Hartmann) 0.25 MG tablet take 1 tablet by mouth twice a day if needed for anxiety 5/3/22 8/1/22  Ranjith Santos MD   isosorbide mononitrate (IMDUR) 30 MG extended release tablet take 1 tablet by mouth once daily 22   Ranjith Santos MD   predniSONE (DELTASONE) 10 MG tablet 2 tablets 2 times a day for 2 days then 1  Tablet 2 times a day for 2 days, then 1 tablet daily till gone 22   Ranjith Santos MD   simvastatin (ZOCOR) 20 MG tablet take 1 tablet by mouth every evening 3/28/22   Ranjith Santos MD   docusate (COLACE, DULCOLAX) 100 MG CAPS Take 100 mg by mouth 2 times daily 3/22/22   Ranjith Santos MD   RA BIOTIN 1000 MCG TABS Take 1,000 mcg by mouth daily Patient taking it twice a day 3/22/22   Ranjith Santos MD   RA P COL-RITE 8.6-50 MG per tablet take 1 to 2 tablets by mouth daily if needed for constipation 3/18/22   Sekou Benitez MD   Nebulizers (COMPRESSOR/NEBULIZER) MISC 1 Device by Does not apply route 4 times daily as needed (Shortness of breath and wheezing) 3/3/22   Ranjith Santos MD   albuterol (PROVENTIL) (2.5 MG/3ML) 0.083% nebulizer solution Take 3 mLs by nebulization every 6 hours as needed for Wheezing 22   Jessica Saldaña MD   polyethylene glycol (GLYCOLAX) 17 GM/SCOOP powder Take 17 g by mouth daily as needed (constipation) 2/16/22 8/15/22  Jenifer Hampton MD   pantoprazole (PROTONIX) 40 MG tablet take 1 tablet by mouth once daily 2/10/22   Jenifer Hampton MD   zinc sulfate (ZINCATE) 220 (50 Zn) MG capsule Take 1 capsule by mouth daily 1/28/22   Oksana Spicer MD   albuterol sulfate  (90 Base) MCG/ACT inhaler Inhale 2 puffs into the lungs every 6 hours as needed for Wheezing or Shortness of Breath 1/25/22   Taemla Araya MD   predniSONE (DELTASONE) 1 MG tablet Take 1 tablet by mouth daily - resume after completes decadron 1/25/22   Tamela Araya MD   levETIRAcetam (KEPPRA) 500 MG tablet take 1 tablet by mouth twice a day 11/16/21   Adolm Sessions, MD   loratadine (CLARITIN) 10 MG tablet take 1 tablet by mouth once daily 11/16/21   Jenifer Hampton MD   REFRESH PLUS 0.5 % SOLN ophthalmic solution use as directed 9/9/21   Historical Provider, MD   ondansetron (ZOFRAN-ODT) 4 MG disintegrating tablet Take 4 mg by mouth every 8 hours as needed 8/5/21   Historical Provider, MD   traZODone (DESYREL) 50 MG tablet take 1 tablet by mouth at bedtime if needed for sleep 8/16/21   Jenifer Hampton MD   lidocaine 4 % external patch Apply 2 patches topically daily 7/21/21   Adolm Sessions, MD   melatonin 3 MG TABS tablet Take 1 tablet by mouth nightly as needed (insomnia) 7/20/21   Adolm Sessions, MD   Nutritional Supplements (ENSURE ORIGINAL) LIQD Take 1 Can by mouth 2 times daily 5/13/21   Jenifer Hampton MD   sertraline (ZOLOFT) 50 MG tablet take 1 tablet by mouth twice a day 4/20/21   Jenifer Hampton MD   Calcium Carbonate-Vitamin D (OYSTER SHELL CALCIUM/D) 500-200 MG-UNIT TABS take 1 tablet by mouth twice a day 3/10/21   Jenifer Hampton MD   RA ASPIRIN EC ADULT LOW ST 81 MG EC tablet take 1 tablet by mouth once daily 10/13/20   Historical Provider, MD   vitamin D (ERGOCALCIFEROL) 1.25 MG (32302 UT) CAPS capsule take 1 capsule by mouth every week 7/5/20   Historical Provider, MD promethazine (PHENERGAN) 25 MG tablet Take 1 tablet by mouth every 6 hours as needed for Nausea (and vomiting) 8/11/20   Priyanka Mehta MD   Multiple Vitamins-Minerals (CENTRUM SILVER ADULT 50+) TABS Take 1 tablet by mouth daily 12/7/15   Gayathri Patel MD       Current medications:    No current facility-administered medications for this visit. No current outpatient medications on file.      Facility-Administered Medications Ordered in Other Visits   Medication Dose Route Frequency Provider Last Rate Last Admin    albuterol (PROVENTIL) nebulizer solution 2.5 mg  2.5 mg Nebulization Q6H PRN PAMELA Kim CNP        haloperidol lactate (HALDOL) injection 2 mg  2 mg IntraVENous Q30 Min PRN PAMELA Kim CNP   2 mg at 05/11/22 0436    HYDROcodone-acetaminophen (NORCO) 5-325 MG per tablet 1 tablet  1 tablet Oral Q6H PRN PAMELA Hand CNP   1 tablet at 05/10/22 1238    Or    HYDROcodone-acetaminophen (NORCO) 5-325 MG per tablet 2 tablet  2 tablet Oral Q6H PRN PAMELA Hand CNP   2 tablet at 05/10/22 1825    albuterol sulfate  (90 Base) MCG/ACT inhaler 2 puff  2 puff Inhalation Q6H PRN PAMELA Hand CNP   2 puff at 05/10/22 0942    miconazole (MICOTIN) 2 % powder   Topical BID PAMELA Kim CNP   Given at 05/11/22 0013    hydrALAZINE (APRESOLINE) injection 5 mg  5 mg IntraVENous Q6H PRN PAMELA Beck CNP   5 mg at 05/11/22 0145    levETIRAcetam (KEPPRA) tablet 500 mg  500 mg Oral BID PAMELA Beck CNP   500 mg at 05/10/22 2307    acetaminophen (TYLENOL) tablet 650 mg  650 mg Oral Q4H PRN PAMELA Hand CNP   650 mg at 05/10/22 0802    phenol 1.4 % mouth spray 1 spray  1 spray Mouth/Throat Q2H PRN PAMELA Kim CNP        isosorbide mononitrate (IMDUR) extended release tablet 30 mg  30 mg Oral Daily Mckenzie Liming, APRN - CNP   30 mg at 05/10/22 0802    ALPRAZolam (XANAX) tablet 0.25 mg  0.25 mg Oral BID PRN Yvonne Thompson MD   0.25 mg at 05/10/22 1825    docusate sodium (COLACE) capsule 100 mg  100 mg Oral BID Lori Weeks APRN - CNP   100 mg at 05/10/22 2017    levothyroxine (SYNTHROID) tablet 100 mcg  100 mcg Oral Daily Lori Weeks APRN - CNP   100 mcg at 05/10/22 0802    melatonin tablet 3 mg  3 mg Oral Nightly PRN Lori Weeks APRN - CNP   3 mg at 05/10/22 2017    pantoprazole (PROTONIX) tablet 40 mg  40 mg Oral QAM AC Lori Weeks, APRN - CNP   40 mg at 05/10/22 0802    polyethylene glycol (GLYCOLAX) packet 17 g  17 g Oral Daily PRN PAMELA Cevallos - CNP        [Held by provider] aspirin EC tablet 81 mg  81 mg Oral Daily Lori Weeks APRN - CNP   81 mg at 05/07/22 8847    sertraline (ZOLOFT) tablet 50 mg  50 mg Oral BID Lori Weeks APRN - CNP   50 mg at 05/11/22 0014    atorvastatin (LIPITOR) tablet 10 mg  10 mg Oral Nightly Lori Weeks APRN - CNP   10 mg at 05/10/22 2307    dexamethasone (DECADRON) tablet 4 mg  4 mg Oral 4 times per day Maranda Obrien MD   4 mg at 05/10/22 1825    enoxaparin (LOVENOX) injection 40 mg  40 mg SubCUTAneous Daily PAMELA Cevallos - CNP   40 mg at 05/10/22 0802    ondansetron (ZOFRAN-ODT) disintegrating tablet 4 mg  4 mg Oral Q8H PRN PAMELA Cevallos - CNP        Or    ondansetron [facility]) injection 4 mg  4 mg IntraVENous Q6H PRN Lori Weeks APRN - CNP           Allergies:     Allergies   Allergen Reactions    Bactrim [Sulfamethoxazole-Trimethoprim] Swelling     Of tongue    Demerol Rash     nausea    Pcn [Penicillins] Rash       Problem List:    Patient Active Problem List   Diagnosis Code    Hypothyroidism E03.9    GERD (gastroesophageal reflux disease) K21.9    Temporal arteritis (HCC) M31.6    Hyperlipidemia E78.5    Hypothyroidism due to acquired atrophy of thyroid E03.4    Steroid-induced hyperglycemia R73.9, T38.0X5A    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 Camelia Serrano, Y92.009    Brain edema (HCC) G93.6    Intraparenchymal hemorrhage of brain (HCC) I61.9    Closed head injury S09.90XA    Metastatic breast cancer (HCC) C50.919    Status post craniotomy Z98.890    Impaired functional mobility, balance, gait, and endurance Z74.09    Acute cystitis without hematuria N30.00    History of craniotomy Z98.890    Generalized weakness R53.1    COVID-19 U07.1    Hypotension I95.9    Pharyngitis J02.9    Thrombocytopenia (HCC) D69.6    Pulmonary nodules R91.8    Essential hypertension I10    Stenosis of left subclavian artery (HCC) I77.1    COVID-19 virus infection U07.1    Brain metastases (HCC) C79.31       Past Medical History:        Diagnosis Date    Anxiety     Blind right eye     Breast cancer ([facility] 05/23/2016    [facility] @ [facility] only chemo, no surgery    Breast cancer metastasized to liver (5/16) and brain (7/21) ([facility] 05/10/2016    Liver lesion noted 5/16 : Brain 7/21    Carotid stenosis      Left ICA 25%    Colostomy in place ([facility] 05/27/2016    Degenerative arthritis of cervical spine 05/2007    GERD (gastroesophageal reflux disease) 11/2006    History of craniotomy 07/2021    mets from breast CA    Hx antineoplastic chemo 2016    left breast cancer, no surgery    Hypercholesteremia     Hypoestrogenism     Hypothyroidism     Lumbago     Lumbosacral spinal stenosis 05/2019    MDRO (multiple drug resistant organisms) resistance 2008    pt stated cleared    Metastasis ([facility] 2019    from breast to brain    Personal history of cardiac dysrhythmia     Respiratory tract infection due to COVID-19 virus 01/21/2022    Sigmoid diverticulosis 08/2004    Spondylolisthesis of lumbosacral region 08/2004    Steroid-induced hyperglycemia     Subclavian artery stenosis (HCC)     left    Superior and Inferior Pubic ramus fracture, right, closed, initial encounter ([facility] 05/21/2019    SVT (supraventricular tachycardia) ([facility] 06/2007    Temporal arteritis ([facility]     Vertebral artery occlusion     left    Vitamin D deficiency 06/2017       Past Surgical History:        Procedure Laterality Date    CARDIAC CATHETERIZATION  5/07    CATARACT REMOVAL  right 1/08; left 2/08    CHOLECYSTECTOMY  1/03    COLONOSCOPY  11/06    COLOSTOMY  09/04/2018    REVERSAL OF COLOSTOMY    CRANIOTOMY Right 7/2/2021    CRANIOTOMY FOR RESECTION/DEBULKING OF RIGHT SIDE INTRA BRAIN TUMOR performed by Ubaldo Lemus MD at [address], COLON, DIAGNOSTIC      EYE REMOVAL Right 03/2017    EYE SURGERY      EYE SURGERY Right 11/06/2017    Dr Fabiola Payan in [address]      partial    OTHER SURGICAL HISTORY  05/27/2016    robotic assisted laparoscopic anterior colon resection and end colostomy    FL OFFICE/OUTPT VISIT,PROCEDURE ONLY N/A 9/4/2018    COLOSTOMY REVERSAL AND PARASTOMAL HERNIA REPAIR performed by Maricruz Winston MD at [address] PULMONARY [facility]UNT  2002    UPPER GASTROINTESTINAL ENDOSCOPY  11/06     BREAST NEEDLE BIOPSY LEFT Left 05/23/2016    [facility]       Social History:    Social History     Tobacco Use    Smoking status: Former Smoker     Packs/day: 0.50     Types: Cigarettes    Smokeless tobacco: Never Used   Substance Use Topics    Alcohol use: No                                Counseling given: Not Answered      Vital Signs (Current):    There were no vitals filed for this visit.                                           BP Readings from Last 3 Encounters:   05/11/22 (!) 164/86   05/06/22 114/70   01/27/22 (!) 124/58       NPO Status:                                                                                 BMI:   Wt Readings from Last 3 Encounters:   05/10/22 143 lb 15.4 oz (65.3 kg)   05/06/22 134 lb (60.8 kg)   01/22/22 134 lb (60.8 kg)     There is no height or weight on file to calculate BMI.    CBC:   Lab Results   Component Value Date    WBC 8.5 05/11/2022    RBC 4.36 05/11/2022    RBC 4.23 04/08/2019    HGB 12.6 05/11/2022    HCT 40.1 05/11/2022    MCV 92.0 05/11/2022    RDW 17.0 04/08/2019     05/11/2022       CMP:   Lab Results   Component Value Date     05/11/2022    K 3.8 05/11/2022    K 3.8 05/06/2022     05/11/2022    CO2 24 05/11/2022    BUN 16 05/11/2022    CREATININE 0.5 05/11/2022    AGRATIO 1.7 07/23/2018    LABGLOM >90 05/11/2022    GLUCOSE 135 05/11/2022    GLUCOSE 103 07/23/2018    PROT 6.4 05/06/2022    CALCIUM 7.6 05/11/2022    BILITOT 0.5 05/06/2022    BILITOT NEGATIVE 11/13/2018    ALKPHOS 43 05/06/2022    AST 24 05/06/2022    ALT 14 05/06/2022       POC Tests:   No results for input(s): POCGLU, POCNA, POCK, POCCL, POCBUN, POCHEMO, POCHCT in the last 72 hours.     Coags:   Lab Results   Component Value Date    INR 0.97 07/02/2021    APTT 29.7 07/02/2021       HCG (If Applicable): No results found for: PREGTESTUR, PREGSERUM, HCG, HCGQUANT     ABGs: No results found for: PHART, PO2ART, RTB1WBA, IIH1ZZC, BEART, K0HOIHLL     Type & Screen (If Applicable):  Lab Results   Component Value Date    LABRH POS 06/28/2021       Drug/Infectious Status (If Applicable):  No results found for: HIV, HEPCAB    COVID-19 Screening (If Applicable):   Lab Results   Component Value Date    COVID19 NOT  DETECTED 05/06/2022           Anesthesia Evaluation  Patient summary reviewed and Nursing notes reviewed no history of anesthetic complications:   Airway: Mallampati: II        Dental:          Pulmonary: breath sounds clear to auscultation                             Cardiovascular:  Exercise tolerance: poor (<4 METS),   (+) CAD:,       ECG reviewed  Rhythm: regular                   ROS comment: Lives at home alone  Does most ADLs  Has some assistance throughout the day     Neuro/Psych:                ROS comment: Fall at home  CT shows large intracranial mass  Denies headache, nausea, vomitting GI/Hepatic/Renal:   (+) GERD: well controlled, liver disease:,           Endo/Other:    (+) Diabeteswell controlled, , .          Pt had no PAT visit       Abdominal:             Vascular: Other Findings:               Anesthesia Plan      general     ASA 3     (Potential for post-operative ventilation, definitely will be going to ICU, patient and family understanding of risks and are willing to proceed)  Induction: intravenous. arterial line  MIPS: Postoperative opioids intended, Prophylactic antiemetics administered, Postoperative trial extubation and Postoperative ventilation. Anesthetic plan and risks discussed with patient and sibling. Use of blood products discussed with patient and sibling whom. Plan discussed with CRNA.                   Cj Jeff MD   5/11/2022

## 2022-05-11 NOTE — BRIEF OP NOTE
Brief Postoperative Note      Patient: Yaya Tony  YOB: 1933  MRN: [id number]    Date of Procedure: 5/11/2022    Pre-Op Diagnosis: BRAIN MASS    Post-Op Diagnosis: Same       Procedure(s):  RIGHT SIDED CRANIOTOMY FOR TUMOR    Surgeon(s):  Juan David Guzman MD    Assistant:  Physician Assistant: Yeimy Cherry PA-C    Anesthesia: General    Estimated Blood Loss (mL): less than 551     Complications: None    Specimens:   ID Type Source Tests Collected by Time Destination   A : right brain lesion Tissue Brain [address] MD 5/11/2022 1018    B : right brain lesion Tissue Brain SURGICAL PATHOLOGY Glendy Miller MD 5/11/2022 1100    C : right brain leion Tissue Brain [address] MD 5/11/2022 1333        Implants:  Implant Name Type Inv.  Item Serial No.  Lot No. LRB No. Used Action   GRAFT DURA 4X5 CM SUTURABLE BCNVX BOV PERICARD DURAGN - [id number]  GRAFT DURA 4X5 CM SUTURABLE BCNVX BOV PERICARD DURAGN  [employer] FABRICE[employer]WD [id number] Right 1 Implanted   GRAFT DURA [id number] REGEN MTRX CLLGN BILAYER SUTURABLE - [id number]  GRAFT DURA [id number] REGEN MTRX CLLGN BILAYER SUTURABLE  [employer] FABRICE[employer]WD [id number] Right 1 Implanted   Biomet thin flap 5mm screw      Right 10 Implanted   biomet thin flap 4mm      Right 8 Implanted   Biomet thin flap 6 hole round metal plate large      Right 1 Implanted         Drains: TREASURE  Closed/Suction Drain Right Scalp Bulb (Active)       NG/OG/NJ/NE Tube Orogastric 16 fr (Active)       Urinary Catheter Koenig-Temperature (Active)       [REMOVED] Urinary Catheter Koenig-Temperature (Removed)   Catheter Indications Need for fluid volume management of the critically ill patient in a critical care setting 05/09/22 1600   Site Assessment [place] 05/09/22 1600   Urine Color Yellow 05/09/22 1600   Urine Appearance Cloudy 05/09/22 0426   Collection Container Standard 05/09/22 1600   Securement Method Securing device (Describe) 05/09/22 1600   Catheter Care Completed Yes 05/08/22 0817   Catheter Best Practices  Drainage tube clipped to bed;Catheter secured to thigh; Tamper seal intact; Bag below bladder;Bag not on floor; Lack of dependent loop in tubing;Drainage bag less than half full 05/09/22 1600   Status Draining 05/09/22 1600   Output (mL) 500 mL 05/09/22 1600       [REMOVED] External Urinary Catheter (Removed)   Output (mL) 250 mL 05/10/22 1400       Findings:Recurrence of brain lesion    Electronically signed by General Dewayne PA-C on 5/11/2022 at 2:50 PM

## 2022-05-11 NOTE — PROGRESS NOTES
Reviewed case. It was decided to go to neurosurgery and remove the met to the brain. Still in OR. Oncology will sign off as there is nothing for me to do at present but wait for the patient to recover from surgery. She can follow up as an outpatient for further discussion about systemic therapy and outpatient PET to evaluate for disease outside the brain. I.e. tail of sofia, etc.of breast or other areas that may be occult. She will also require XRT to brain after recovery from [address] [place] [address] before any systemic therapy.

## 2022-05-11 NOTE — PROGRESS NOTES
Patient was seen and examined in the pre op area in conjunction with neurosurgery EMELYN Ortiz PA-C). Agitation issues over the night, otherwise there are no changes in patient symptoms and neurological exam since yesterday. Today I discussed with patient and her family again today planned/recommended surgical intervention as well as the associated risk and benefit and alternative. All questions and concerns were addressed and answered. Patient elected again to go with today planned surgical intervention.

## 2022-05-11 NOTE — PROGRESS NOTES
1452: Pt arrives to pacu, opens eyes to verbal stimuli. Unable to follow commands. Pt moving all extremities. Pt on 6L simple mask, respirations easy and unlabored. VSS  1457: Crna administered 50mcg of fentanyl for pain  1457: 10mg of labetalol administered for elevated bp  1510: Pt sleeping at this time  1530: Pt awakens to verbal stimuli, able to follow commands  1600: Pt c/o pain, 50mcg of fentanyl administered. VSS  1615: Provided report to 4D Rn  1620: Pt transported to CT and to ICU in stable condition.  VSS

## 2022-05-11 NOTE — ANESTHESIA POSTPROCEDURE EVALUATION
Department of Anesthesiology  Postprocedure Note    Patient: Prabhjot Faustin  MRN: [id number]  YOB: 1933  Date of evaluation: 5/11/2022  Time:  3:56 PM     Procedure Summary     Date: 05/11/22 Room / Location: [facility] / Cameron Smith    Anesthesia Start: 0740 Anesthesia Stop: 1946    Procedure: RIGHT SIDED CRANIOTOMY FOR TUMOR (Right Head) Diagnosis: (BRAIN MASS)    Surgeons: Radha Baldwin MD Responsible Provider: Mik Heard MD    Anesthesia Type: general ASA Status: 3          Anesthesia Type: No value filed. Lavonne Phase I: Lavonne Score: 8    Lavonne Phase II:      Last vitals: Reviewed and per EMR flowsheets.        Anesthesia Post Evaluation    Patient location during evaluation: PACU  Patient participation: complete - patient participated  Level of consciousness: awake and alert  Airway patency: patent  Nausea & Vomiting: no nausea  Complications: no  Cardiovascular status: blood pressure returned to baseline and hemodynamically stable  Respiratory status: acceptable and spontaneous ventilation  Hydration status: euvolemic

## 2022-05-12 ENCOUNTER — APPOINTMENT (OUTPATIENT)
Dept: MRI IMAGING | Age: 87
DRG: 025 | End: 2022-05-12
Payer: MEDICARE

## 2022-05-12 LAB
ANION GAP SERPL CALCULATED.3IONS-SCNC: 11 MEQ/L (ref 8–16)
BUN BLDV-MCNC: 15 MG/DL (ref 7–22)
CALCIUM IONIZED: 1.16 MMOL/L (ref 1.12–1.32)
CALCIUM SERPL-MCNC: 7.9 MG/DL (ref 8.5–10.5)
CHLORIDE BLD-SCNC: 112 MEQ/L (ref 98–111)
CO2: 24 MEQ/L (ref 23–33)
CREAT SERPL-MCNC: 0.5 MG/DL (ref 0.4–1.2)
EKG Q-T INTERVAL: 302 MS
EKG QRS DURATION: 82 MS
EKG QTC CALCULATION (BAZETT): 447 MS
EKG R AXIS: 20 DEGREES
EKG T AXIS: -150 DEGREES
EKG VENTRICULAR RATE: 132 BPM
ERYTHROCYTE [DISTWIDTH] IN BLOOD BY AUTOMATED COUNT: 14.6 % (ref 11.5–14.5)
ERYTHROCYTE [DISTWIDTH] IN BLOOD BY AUTOMATED COUNT: 50 FL (ref 35–45)
GFR SERPL CREATININE-BSD FRML MDRD: > 90 ML/MIN/1.73M2
GLUCOSE BLD-MCNC: 133 MG/DL (ref 70–108)
GLUCOSE BLD-MCNC: 150 MG/DL (ref 70–108)
GLUCOSE BLD-MCNC: 155 MG/DL (ref 70–108)
GLUCOSE BLD-MCNC: 171 MG/DL (ref 70–108)
GLUCOSE BLD-MCNC: 182 MG/DL (ref 70–108)
HCT VFR BLD CALC: 34.3 % (ref 37–47)
HCT VFR BLD CALC: 34.9 % (ref 37–47)
HEMOGLOBIN: 10.8 GM/DL (ref 12–16)
HEMOGLOBIN: 10.8 GM/DL (ref 12–16)
LV EF: 68 %
LVEF MODALITY: NORMAL
MAGNESIUM: 2.1 MG/DL (ref 1.6–2.4)
MCH RBC QN AUTO: 29.2 PG (ref 26–33)
MCHC RBC AUTO-ENTMCNC: 30.9 GM/DL (ref 32.2–35.5)
MCV RBC AUTO: 94.3 FL (ref 81–99)
PLATELET # BLD: 106 THOU/MM3 (ref 130–400)
PMV BLD AUTO: 12.4 FL (ref 9.4–12.4)
POTASSIUM SERPL-SCNC: 4 MEQ/L (ref 3.5–5.2)
RBC # BLD: 3.7 MILL/MM3 (ref 4.2–5.4)
SODIUM BLD-SCNC: 145 MEQ/L (ref 135–145)
SODIUM BLD-SCNC: 147 MEQ/L (ref 135–145)
WBC # BLD: 8.9 THOU/MM3 (ref 4.8–10.8)

## 2022-05-12 PROCEDURE — 84295 ASSAY OF SERUM SODIUM: CPT

## 2022-05-12 PROCEDURE — 2580000003 HC RX 258: Performed by: NURSE PRACTITIONER

## 2022-05-12 PROCEDURE — 6370000000 HC RX 637 (ALT 250 FOR IP): Performed by: INTERNAL MEDICINE

## 2022-05-12 PROCEDURE — 6370000000 HC RX 637 (ALT 250 FOR IP): Performed by: PHYSICIAN ASSISTANT

## 2022-05-12 PROCEDURE — 6370000000 HC RX 637 (ALT 250 FOR IP): Performed by: NURSE PRACTITIONER

## 2022-05-12 PROCEDURE — 85014 HEMATOCRIT: CPT

## 2022-05-12 PROCEDURE — 6360000002 HC RX W HCPCS: Performed by: NURSE PRACTITIONER

## 2022-05-12 PROCEDURE — 85018 HEMOGLOBIN: CPT

## 2022-05-12 PROCEDURE — 97129 THER IVNTJ 1ST 15 MIN: CPT

## 2022-05-12 PROCEDURE — 99024 POSTOP FOLLOW-UP VISIT: CPT | Performed by: NEUROLOGICAL SURGERY

## 2022-05-12 PROCEDURE — 80048 BASIC METABOLIC PNL TOTAL CA: CPT

## 2022-05-12 PROCEDURE — 99291 CRITICAL CARE FIRST HOUR: CPT | Performed by: INTERNAL MEDICINE

## 2022-05-12 PROCEDURE — A9579 GAD-BASE MR CONTRAST NOS,1ML: HCPCS | Performed by: NURSE PRACTITIONER

## 2022-05-12 PROCEDURE — 93010 ELECTROCARDIOGRAM REPORT: CPT | Performed by: NUCLEAR MEDICINE

## 2022-05-12 PROCEDURE — 2500000003 HC RX 250 WO HCPCS: Performed by: NURSE PRACTITIONER

## 2022-05-12 PROCEDURE — 6360000002 HC RX W HCPCS: Performed by: PHYSICIAN ASSISTANT

## 2022-05-12 PROCEDURE — 70553 MRI BRAIN STEM W/O & W/DYE: CPT

## 2022-05-12 PROCEDURE — 2000000000 HC ICU R&B

## 2022-05-12 PROCEDURE — 94761 N-INVAS EAR/PLS OXIMETRY MLT: CPT

## 2022-05-12 PROCEDURE — 82330 ASSAY OF CALCIUM: CPT

## 2022-05-12 PROCEDURE — 2580000003 HC RX 258: Performed by: PHYSICIAN ASSISTANT

## 2022-05-12 PROCEDURE — 92523 SPEECH SOUND LANG COMPREHEN: CPT

## 2022-05-12 PROCEDURE — 85027 COMPLETE CBC AUTOMATED: CPT

## 2022-05-12 PROCEDURE — 82948 REAGENT STRIP/BLOOD GLUCOSE: CPT

## 2022-05-12 PROCEDURE — APPSS30 APP SPLIT SHARED TIME 16-30 MINUTES: Performed by: PHYSICIAN ASSISTANT

## 2022-05-12 PROCEDURE — 6360000002 HC RX W HCPCS: Performed by: STUDENT IN AN ORGANIZED HEALTH CARE EDUCATION/TRAINING PROGRAM

## 2022-05-12 PROCEDURE — 36592 COLLECT BLOOD FROM PICC: CPT

## 2022-05-12 PROCEDURE — 93306 TTE W/DOPPLER COMPLETE: CPT

## 2022-05-12 PROCEDURE — 94640 AIRWAY INHALATION TREATMENT: CPT

## 2022-05-12 PROCEDURE — 2700000000 HC OXYGEN THERAPY PER DAY

## 2022-05-12 PROCEDURE — 6360000004 HC RX CONTRAST MEDICATION: Performed by: NURSE PRACTITIONER

## 2022-05-12 PROCEDURE — 83735 ASSAY OF MAGNESIUM: CPT

## 2022-05-12 RX ORDER — ONDANSETRON 4 MG/1
4 TABLET, ORALLY DISINTEGRATING ORAL EVERY 8 HOURS PRN
Status: DISCONTINUED | OUTPATIENT
Start: 2022-05-12 | End: 2022-05-12 | Stop reason: SDUPTHER

## 2022-05-12 RX ORDER — TRAZODONE HYDROCHLORIDE 50 MG/1
50 TABLET ORAL NIGHTLY
Status: DISCONTINUED | OUTPATIENT
Start: 2022-05-12 | End: 2022-05-19 | Stop reason: HOSPADM

## 2022-05-12 RX ORDER — CIPROFLOXACIN 500 MG/1
500 TABLET, FILM COATED ORAL 2 TIMES DAILY
Status: DISCONTINUED | OUTPATIENT
Start: 2022-05-12 | End: 2022-05-13

## 2022-05-12 RX ORDER — LIDOCAINE 4 G/G
2 PATCH TOPICAL DAILY
Status: DISCONTINUED | OUTPATIENT
Start: 2022-05-12 | End: 2022-05-19 | Stop reason: HOSPADM

## 2022-05-12 RX ORDER — ALPRAZOLAM 0.25 MG/1
0.25 TABLET ORAL NIGHTLY PRN
Status: DISCONTINUED | OUTPATIENT
Start: 2022-05-12 | End: 2022-05-19 | Stop reason: HOSPADM

## 2022-05-12 RX ORDER — ALBUTEROL SULFATE 2.5 MG/3ML
2.5 SOLUTION RESPIRATORY (INHALATION) EVERY 6 HOURS PRN
Status: DISCONTINUED | OUTPATIENT
Start: 2022-05-12 | End: 2022-05-12

## 2022-05-12 RX ORDER — ALBUTEROL SULFATE 90 UG/1
2 AEROSOL, METERED RESPIRATORY (INHALATION) EVERY 6 HOURS PRN
Status: DISCONTINUED | OUTPATIENT
Start: 2022-05-12 | End: 2022-05-12 | Stop reason: SDUPTHER

## 2022-05-12 RX ORDER — INSULIN LISPRO 100 [IU]/ML
0-6 INJECTION, SOLUTION INTRAVENOUS; SUBCUTANEOUS
Status: DISCONTINUED | OUTPATIENT
Start: 2022-05-12 | End: 2022-05-19 | Stop reason: HOSPADM

## 2022-05-12 RX ORDER — MAGNESIUM SULFATE IN WATER 40 MG/ML
2000 INJECTION, SOLUTION INTRAVENOUS PRN
Status: DISCONTINUED | OUTPATIENT
Start: 2022-05-12 | End: 2022-05-19 | Stop reason: HOSPADM

## 2022-05-12 RX ORDER — MORPHINE SULFATE 2 MG/ML
2 INJECTION, SOLUTION INTRAMUSCULAR; INTRAVENOUS
Status: DISCONTINUED | OUTPATIENT
Start: 2022-05-12 | End: 2022-05-12

## 2022-05-12 RX ORDER — CETIRIZINE HYDROCHLORIDE 5 MG/1
5 TABLET ORAL DAILY
Status: DISCONTINUED | OUTPATIENT
Start: 2022-05-12 | End: 2022-05-19 | Stop reason: HOSPADM

## 2022-05-12 RX ORDER — MORPHINE SULFATE 4 MG/ML
4 INJECTION, SOLUTION INTRAMUSCULAR; INTRAVENOUS
Status: DISCONTINUED | OUTPATIENT
Start: 2022-05-12 | End: 2022-05-12

## 2022-05-12 RX ORDER — DEXTROSE MONOHYDRATE 50 MG/ML
100 INJECTION, SOLUTION INTRAVENOUS PRN
Status: DISCONTINUED | OUTPATIENT
Start: 2022-05-12 | End: 2022-05-19 | Stop reason: HOSPADM

## 2022-05-12 RX ORDER — SODIUM CHLORIDE 0.9 % (FLUSH) 0.9 %
5-40 SYRINGE (ML) INJECTION EVERY 12 HOURS SCHEDULED
Status: DISCONTINUED | OUTPATIENT
Start: 2022-05-12 | End: 2022-05-19 | Stop reason: HOSPADM

## 2022-05-12 RX ORDER — PREDNISONE 1 MG/1
1 TABLET ORAL DAILY
Status: DISCONTINUED | OUTPATIENT
Start: 2022-05-13 | End: 2022-05-19 | Stop reason: HOSPADM

## 2022-05-12 RX ORDER — INSULIN LISPRO 100 [IU]/ML
0-3 INJECTION, SOLUTION INTRAVENOUS; SUBCUTANEOUS NIGHTLY
Status: DISCONTINUED | OUTPATIENT
Start: 2022-05-12 | End: 2022-05-19 | Stop reason: HOSPADM

## 2022-05-12 RX ORDER — PREDNISONE 1 MG/1
1 TABLET ORAL DAILY
Status: DISCONTINUED | OUTPATIENT
Start: 2022-05-12 | End: 2022-05-12 | Stop reason: SDUPTHER

## 2022-05-12 RX ORDER — SODIUM CHLORIDE 0.9 % (FLUSH) 0.9 %
5-40 SYRINGE (ML) INJECTION PRN
Status: DISCONTINUED | OUTPATIENT
Start: 2022-05-12 | End: 2022-05-19 | Stop reason: HOSPADM

## 2022-05-12 RX ORDER — PROMETHAZINE HYDROCHLORIDE 25 MG/1
25 TABLET ORAL EVERY 6 HOURS PRN
Status: DISCONTINUED | OUTPATIENT
Start: 2022-05-12 | End: 2022-05-12

## 2022-05-12 RX ORDER — SODIUM CHLORIDE 9 MG/ML
INJECTION, SOLUTION INTRAVENOUS PRN
Status: DISCONTINUED | OUTPATIENT
Start: 2022-05-12 | End: 2022-05-19 | Stop reason: HOSPADM

## 2022-05-12 RX ADMIN — SODIUM CHLORIDE, PRESERVATIVE FREE 10 ML: 5 INJECTION INTRAVENOUS at 20:59

## 2022-05-12 RX ADMIN — AMIODARONE HYDROCHLORIDE 0.5 MG/MIN: 1.8 INJECTION, SOLUTION INTRAVENOUS at 16:21

## 2022-05-12 RX ADMIN — DEXAMETHASONE 4 MG: 4 TABLET ORAL at 12:17

## 2022-05-12 RX ADMIN — MAGNESIUM SULFATE HEPTAHYDRATE 2000 MG: 40 INJECTION, SOLUTION INTRAVENOUS at 05:11

## 2022-05-12 RX ADMIN — HYDRALAZINE HYDROCHLORIDE 10 MG: 20 INJECTION, SOLUTION INTRAMUSCULAR; INTRAVENOUS at 10:03

## 2022-05-12 RX ADMIN — PANTOPRAZOLE SODIUM 40 MG: 40 TABLET, DELAYED RELEASE ORAL at 09:34

## 2022-05-12 RX ADMIN — MICONAZOLE NITRATE: 20 POWDER TOPICAL at 21:00

## 2022-05-12 RX ADMIN — AMIODARONE HYDROCHLORIDE 0.5 MG/MIN: 1.8 INJECTION, SOLUTION INTRAVENOUS at 04:11

## 2022-05-12 RX ADMIN — DOCUSATE SODIUM 100 MG: 100 CAPSULE, LIQUID FILLED ORAL at 09:34

## 2022-05-12 RX ADMIN — ISOSORBIDE MONONITRATE 30 MG: 30 TABLET, EXTENDED RELEASE ORAL at 09:34

## 2022-05-12 RX ADMIN — SERTRALINE 50 MG: 50 TABLET, FILM COATED ORAL at 09:34

## 2022-05-12 RX ADMIN — MICONAZOLE NITRATE: 20 POWDER TOPICAL at 00:09

## 2022-05-12 RX ADMIN — CIPROFLOXACIN HYDROCHLORIDE 500 MG: 500 TABLET, FILM COATED ORAL at 12:17

## 2022-05-12 RX ADMIN — LEVOTHYROXINE SODIUM 100 MCG: 0.1 TABLET ORAL at 09:34

## 2022-05-12 RX ADMIN — ATORVASTATIN CALCIUM 10 MG: 10 TABLET, FILM COATED ORAL at 21:00

## 2022-05-12 RX ADMIN — LEVETIRACETAM 500 MG: 500 TABLET, FILM COATED ORAL at 09:34

## 2022-05-12 RX ADMIN — HYDRALAZINE HYDROCHLORIDE 10 MG: 20 INJECTION, SOLUTION INTRAMUSCULAR; INTRAVENOUS at 00:07

## 2022-05-12 RX ADMIN — FENTANYL CITRATE 25 MCG: 50 INJECTION, SOLUTION INTRAMUSCULAR; INTRAVENOUS at 12:27

## 2022-05-12 RX ADMIN — MICONAZOLE NITRATE: 20 POWDER TOPICAL at 09:35

## 2022-05-12 RX ADMIN — HYDROCODONE BITARTRATE AND ACETAMINOPHEN 2 TABLET: 5; 325 TABLET ORAL at 10:10

## 2022-05-12 RX ADMIN — CETIRIZINE HYDROCHLORIDE 5 MG: 5 TABLET ORAL at 12:17

## 2022-05-12 RX ADMIN — DOCUSATE SODIUM 100 MG: 100 CAPSULE, LIQUID FILLED ORAL at 21:00

## 2022-05-12 RX ADMIN — POLYETHYLENE GLYCOL 3350 17 G: 17 POWDER, FOR SOLUTION ORAL at 09:40

## 2022-05-12 RX ADMIN — SODIUM CHLORIDE 35 ML/HR: 3 INJECTION, SOLUTION INTRAVENOUS at 05:14

## 2022-05-12 RX ADMIN — INSULIN LISPRO 1 UNITS: 100 INJECTION, SOLUTION INTRAVENOUS; SUBCUTANEOUS at 12:25

## 2022-05-12 RX ADMIN — TRAZODONE HYDROCHLORIDE 50 MG: 50 TABLET ORAL at 20:59

## 2022-05-12 RX ADMIN — SODIUM CHLORIDE, PRESERVATIVE FREE 10 ML: 5 INJECTION INTRAVENOUS at 12:16

## 2022-05-12 RX ADMIN — INSULIN LISPRO 1 UNITS: 100 INJECTION, SOLUTION INTRAVENOUS; SUBCUTANEOUS at 17:43

## 2022-05-12 RX ADMIN — ALBUTEROL SULFATE 2 PUFF: 90 AEROSOL, METERED RESPIRATORY (INHALATION) at 09:59

## 2022-05-12 RX ADMIN — DEXAMETHASONE 4 MG: 4 TABLET ORAL at 05:04

## 2022-05-12 RX ADMIN — LEVETIRACETAM 500 MG: 500 TABLET, FILM COATED ORAL at 20:59

## 2022-05-12 RX ADMIN — GADOTERIDOL 10 ML: 279.3 INJECTION, SOLUTION INTRAVENOUS at 15:58

## 2022-05-12 RX ADMIN — SERTRALINE 50 MG: 50 TABLET, FILM COATED ORAL at 20:59

## 2022-05-12 RX ADMIN — DEXAMETHASONE 4 MG: 4 TABLET ORAL at 00:31

## 2022-05-12 RX ADMIN — CIPROFLOXACIN HYDROCHLORIDE 500 MG: 500 TABLET, FILM COATED ORAL at 21:00

## 2022-05-12 RX ADMIN — Medication 2000 MG: at 18:00

## 2022-05-12 RX ADMIN — Medication 1 TABLET: at 13:20

## 2022-05-12 RX ADMIN — ALPRAZOLAM 0.25 MG: 0.25 TABLET ORAL at 00:31

## 2022-05-12 RX ADMIN — LEVETIRACETAM 500 MG: 500 TABLET, FILM COATED ORAL at 00:10

## 2022-05-12 RX ADMIN — INSULIN LISPRO 1 UNITS: 100 INJECTION, SOLUTION INTRAVENOUS; SUBCUTANEOUS at 21:18

## 2022-05-12 RX ADMIN — HYDROCODONE BITARTRATE AND ACETAMINOPHEN 2 TABLET: 5; 325 TABLET ORAL at 00:31

## 2022-05-12 ASSESSMENT — PAIN DESCRIPTION - DESCRIPTORS
DESCRIPTORS: ACHING

## 2022-05-12 ASSESSMENT — ENCOUNTER SYMPTOMS
COLOR CHANGE: 0
PHOTOPHOBIA: 0
VOMITING: 0
BACK PAIN: 0
ABDOMINAL PAIN: 0
TROUBLE SWALLOWING: 0
SORE THROAT: 0
WHEEZING: 0
SHORTNESS OF BREATH: 0
CHEST TIGHTNESS: 0
NAUSEA: 0

## 2022-05-12 ASSESSMENT — PAIN SCALES - GENERAL
PAINLEVEL_OUTOF10: 4
PAINLEVEL_OUTOF10: 0
PAINLEVEL_OUTOF10: 0
PAINLEVEL_OUTOF10: 6
PAINLEVEL_OUTOF10: 10
PAINLEVEL_OUTOF10: 6
PAINLEVEL_OUTOF10: 7
PAINLEVEL_OUTOF10: 0
PAINLEVEL_OUTOF10: 0

## 2022-05-12 ASSESSMENT — PAIN DESCRIPTION - ORIENTATION
ORIENTATION: POSTERIOR
ORIENTATION: POSTERIOR
ORIENTATION: MID;POSTERIOR

## 2022-05-12 ASSESSMENT — PAIN DESCRIPTION - LOCATION
LOCATION: HEAD
LOCATION: HEAD
LOCATION: NECK

## 2022-05-12 ASSESSMENT — PAIN - FUNCTIONAL ASSESSMENT
PAIN_FUNCTIONAL_ASSESSMENT: ACTIVITIES ARE NOT PREVENTED
PAIN_FUNCTIONAL_ASSESSMENT: ACTIVITIES ARE NOT PREVENTED

## 2022-05-12 NOTE — PROGRESS NOTES
Shift Summary [id number]    At approximately 0820, pt HR spontaneously to 140-160's, irregular (appears to be atrial fib with RVR) with noted drop in BP via continuous monitoring with arterial line. 12 Lead EKG completed. Pt symptomatic, feels \"dizzy\" and \"heart racing\". Mildly hypotensive. Notified PAMELA Luevano. See orders for amiodarone bolus and drip. When going to administer amiodarone bolus, pt converted to NSR, rate 70. VSS. Notified PAMELA Luevano. Amiodarone bolus and drip held. Amiodarone drip started at 0411 without bolus. Pt remains NSR without further rhythm changes. Symptoms resolved. Neurologic condition unchanged throughout night. Continue to monitor closely.

## 2022-05-12 NOTE — PROGRESS NOTES
[facility] ICU 4D  Speech - Language - Cognitive Evaluation    SLP Individual Minutes  Time In: 5770  Time Out: 2956  Minutes: 19  Timed Code Treatment Minutes: 0 Minutes       Date: 2022  Patient Name: Natasha Pagan      CSN: [id number]   : 1933  (80 y.o.)  Gender: female   Referring Physician:  Marci Ramirez MD  Diagnosis: brain edema   Precautions: fall risk   History of Present Illness/Injury: Latanya Rod is an 80-year-old white female who presented to [facility] on 2022 for lower extremity numbness headaches and nausea and vomiting. 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  Per report patient presents that for the last 2 weeks she had had progressive headache she reported 3-4 headaches a week she is not [place] difficulty sleeping she stated that she has had nausea and vomiting that started a few days prior.  She endorsed feeling fatigued.  She denied any dysuria or urgency but did states she had decreased urine output.  In the emergency department labs were normal.  Troponin was negative.  EKG was completed which was negative.  She underwent CT head which showed cerebral edema with a right temporal occipital parietal mass.  Consult to neurosurgery Dr. Alisa Coleman recommended admission to the ICU for close neurological monitoring.  Patient was started on Decadron and hypertonic saline.  MRI brain was ordered.  Patient was admitted to ICU in stable condition.  patient underwent surgical resection on . She did have an episode of atrial fib with RVR overnight. This has since resolved.   Past Medical History:   Diagnosis Date    Anxiety     Blind right eye     [facility] 05/23/2016    IDC @ [facility] only chemo, no surgery    Breast cancer metastasized to liver (5/16) and brain (7/21) ([facility] 05/10/2016    Liver lesion noted 5/16 : Brain 7/21    Carotid stenosis      Left ICA 25%    Colostomy in place [facility]) 05/27/2016    Degenerative arthritis of cervical spine 05/2007    GERD (gastroesophageal reflux disease) 11/2006    History of craniotomy 07/2021    mets from breast CA    Hx antineoplastic chemo 2016    left breast cancer, no surgery    Hypercholesteremia     Hypoestrogenism     Hypothyroidism     Lumbago     Lumbosacral spinal stenosis 05/2019    MDRO (multiple drug resistant organisms) resistance 2008    pt stated cleared    Metastasis ([facility] 2019    from breast to brain    Personal history of cardiac dysrhythmia     Respiratory tract infection due to COVID-19 virus 01/21/2022    Sigmoid diverticulosis 08/2004    Spondylolisthesis of lumbosacral region 08/2004    Steroid-induced hyperglycemia     Subclavian artery stenosis (HCC)     left    Superior and Inferior Pubic ramus fracture, right, closed, initial encounter ([facility] 05/21/2019    SVT (supraventricular tachycardia) ([facility] 06/2007    Temporal arteritis (HCC)     Vertebral artery occlusion     left    Vitamin D deficiency 06/2017       Pain: No pain reported. Subjective:  Patient seen upright in bed with RN permission. No family present on this date. *pt with recent admission to [facility] d/c home with support from family at that time (~6 months ago). SOCIAL HISTORY:   Living Arrangements: Lives at home alone with [facility] rn ad caregivers. PT independently stated she will need more help when she goes home. Work History: Retired  Education Level: 8th grade education level   Driving Status: Does not drive  Finance Management: Independent  Medication Management: Assistance Required  ADL's: Assistance Required.  Aide comes in M-F, assistance with medications, bathing, toileting (as available), cooking and cleaning   Hobbies: Enjoys writing out grocery lists although admits to difficulty given arthritis in hands and R eye blindness   Vision Status: Blind in R eye   Hearing: decreased hearing acuity, no assistive devices        SPEECH / VOICE:  Speech and Voice appear to be grossly intact for basic and complex daily communication    LANGUAGE:  Receptive:  Receptive language skills appear to be grossly intact for basic daily communication. Expressive:  Expressive language skills appear to be grossly intact for basic daily communication. COGNITION:  [facility]) version 7.1 completed. Pt scored /30. Normal is greater than or equal to 26/30. Inclusion of +1 point given highest level of education achieved less than/equal to 12th grade or GED with limited-0 post-secondary schooling   Orientation:   Immediate Recall: Trial 1: 3/5, Trial 2: 4/5   Short-Term Recall: 0/5 indep, 1/5 min A, 4/5 max FO2  Divergent Namin words/min   Reasoning: verbal: 0/2  Sequencing: poor   Insight: fair   Attention: poor basic sustained and alternating   Math Computation: poor - 0/3 on serial 7's   Executive Functioning: inability to complete d/t visual restriction     SWALLOWING:  Current Diet: regular solids, thin liquids   Not Tested   *informal screen - pt's breakfast arrived (christian, eggs, pancakes) complete 4 bites with ST present with no overt s/s of aspiration/penetration. Formal evaluation no warranted at this time. RECOMMENDATIONS/ASSESSMENT:  DIAGNOSTIC IMPRESSIONS:  Patient presented with a severe cognitive linguistic impairment as supported by a MOCA score of 6/30. Given recent admission and documentation upon d/c from [facility], the pt has experienced a significant cognitive decline this admission.  The pt's deficits are in the domains of basic attention (sustained, alternating), orientation to the date and [place], problem solving, sequencing, thought organization, verbal/visual reasoning, and basic numeric reasoning and processing speed; all required to safely return to home setting at [facility]. Pt is experiencing visual deficits, impacting her ability to identify objects on her breakfast tray, and visual portions of the MOCA. Receptive and expressive language remains intact for basic daily communication of wants/needs. No dysarthria or dysphonia detected. Recommended  STRICT 24 hour supervision for safe return to home setting, along with continued skilled ST to address aforementioned cognitive deficits. Rehabilitation Potential: good    EDUCATION:  Learner: Patient  Education:  Reviewed results and recommendations of this evaluation, Reviewed ST goals and Plan of Care and Reviewed recommendations for follow-up  Evaluation of Education: Rip Forth understanding, Needs further instruction and Family not present    PLAN:  Skilled SLP intervention on acute care 3-5 x per week or until goals met and/or pt plateaus in function. Specific interventions for next session may include: cognitive tasks - orienation, reasoning, problem solving sequencing. Derek Sandra PATIENT GOAL:    Did not state. Will further assess during treatment. SHORT TERM GOALS:  Short-term Goals  Timeframe for Short-term Goals: 2 weeks from evaluation  Goal 1: PAtient will complete orienation and basic STM recall with 3-4 units (immediate, delayed recall), and working memory tasks with 60% accuracy given MOD A to assist in awareness of environment and retention of personal and newly learned information  Goal 2: Patient will complete basic problem solving, visual/verbal reasoning, and safety awareness tasks (call light, 911, etc) with 70% accuracy given MOD A to assist in requesting help and identifying unsafe situations.   Goal 3: PAtient will complete organization (divergent/convergent) thinking tasks and sequencing tasks with 60% accuracy given MOD A to assist in safe completion of ADLs/iADLs  Goal 4: Pt will complete basic and mildly complex attention tasks with fewer than 5 erros in a 2 minutes time frame. LONG TERM GOALS:  No LTM Goals recommended, due to anticipated short ELOS. Julia De La Cruz MA., Miriam Damico / EMMANUEL#.[phone]

## 2022-05-12 NOTE — PROGRESS NOTES
Attending Note:     Patient was seen and examined by me in ICU in conjunction with neurosurgery PA Avelina Baumgarten, PA-C). Discussed with patient, his nurse and the  ICU team. All data and imaging reviewed by myself. I agree with examination assessment and plan as documented below.     -Doing well.  -No neuro no new neurological deficit (in fact patient reported an improvement in her vision)  -Postop brain CT showed expected postoperative changes.  -Up to the chair. -PT and OT.  -Continue the current medical management per the ICU team.  -Neurosurgery will follow. Sharon Freed MD      Neurosurgery Progress Note    Patient:  Eloisa Fortune      Unit/Bed:4D-08/008-A    YOB: 1933    MRN: [id number]     Acct: [de-identified]     Admit date: 5/6/2022    Chief Complaint   Patient presents with    Abdominal Pain       Patient Seen, Chart, Physician notes, Labs, Radiology studies reviewed. Subjective: Patient is seen and evaluated in the ICU setting with evaluation exam findings reviewed and discussed with Dr. Bakari Gallardo and with nursing. Patient is resting comfortably postoperative day #1 from reexploration and revision of craniotomy for resection of recurrence of intercranial lesion performed Dr. Bakari Gallardo, without complication. He tolerated the procedure nicely was resting comfortably this morning with only minor incisional site discomfort noted. Past, Family, Social History unchanged from admission. Diet:  ADULT DIET;  Regular    Medications:  Scheduled Meds:   insulin lispro  0-6 Units SubCUTAneous TID WC    insulin lispro  0-3 Units SubCUTAneous Nightly    amiodarone  150 mg IntraVENous Once    miconazole   Topical BID    levETIRAcetam  500 mg Oral BID    isosorbide mononitrate  30 mg Oral Daily    docusate sodium  100 mg Oral BID    levothyroxine  100 mcg Oral Daily    pantoprazole  40 mg Oral QAM AC    [Held by provider] aspirin  81 mg Oral Daily    sertraline  50 mg Oral BID    atorvastatin  10 mg Oral Nightly    dexamethasone  4 mg Oral 4 times per day    [Held by provider] enoxaparin  40 mg SubCUTAneous Daily     Continuous Infusions:   dextrose      amiodarone 0.5 mg/min (05/12/22 0411)    sodium chloride 35 mL/hr (05/12/22 0514)     PRN Meds:magnesium sulfate, glucose, dextrose bolus **OR** dextrose bolus, glucagon (rDNA), dextrose, albuterol, haloperidol lactate, fentanNYL, fentanNYL, hydrALAZINE, HYDROcodone 5 mg - acetaminophen **OR** HYDROcodone 5 mg - acetaminophen, albuterol sulfate HFA, acetaminophen, phenol, ALPRAZolam, melatonin, polyethylene glycol, ondansetron **OR** ondansetron    Objective: Patient is lying in bed with head of the bed elevated approximately 30 degrees and is comfortable. Pain was moderately controlled. Dressings are intact over flat dry incisions and the surgical drain was intact and functioning this morning with approximately 100 cc of output noted per shift. She presents with clear appropriate speech and participated in discussions involving her care appropriately stating that she feels much better and that she can see more clearly. She remains stable and intact neurologically otherwise to her baseline with no additional significant changes noted. Vitals: BP (!) 150/50   Pulse 64   Temp 98.8 °F (37.1 °C) (Oral)   Resp 15   Ht 5' (1.524 m)   Wt 145 lb 4.5 oz (65.9 kg)   LMP  (LMP Unknown) Comment: age 52 ovaries intact  SpO2 99%   BMI 28.37 kg/m²     Physical Exam   Remained stable neurologically intact to her baseline on exam this morning with no additional significant changes noted overnight. Physical Exam:  Alert and attentive. Language appropriate, with no aphasia. Pupils equal.  Facial strength symmetric.     Review of Systems   Patient relates mild incisional site discomfort but was absent headache at the time of exam.  Patient did describe a mild headache immediately following the procedure which resolved overnight. She denies chest pain, shortness of breath nausea or vomiting. The remaining 12 points of review of systems otherwise negative. 24 hour intake/output:    Intake/Output Summary (Last 24 hours) at 5/12/2022 0744  Last data filed at 5/12/2022 0514  Gross per 24 hour   Intake 3558.87 ml   Output 3650 ml   Net -91.13 ml     Last 3 weights: Wt Readings from Last 3 Encounters:   05/12/22 145 lb 4.5 oz (65.9 kg)   05/06/22 134 lb (60.8 kg)   01/22/22 134 lb (60.8 kg)         CBC:   Recent Labs     05/10/22  0530 05/10/22  0530 05/11/22  0330 05/12/22  0015 05/12/22  0450   WBC 9.5  --  8.5  --  8.9   HGB 12.3   < > 12.6 10.8* 10.8*     --  148  --  106*    < > = values in this interval not displayed. BMP:    Recent Labs     05/11/22  0330 05/11/22  0640 05/11/22  1139 05/11/22  1710 05/11/22  2100 05/12/22  0015 05/12/22  0450      < > 144   < > 145 145 147*   K 3.8   < > 3.0*  --  3.7  --  4.0     --   --   --  111  --  112*   CO2 24  --   --   --  25  --  24   BUN 16  --   --   --  13  --  15   CREATININE 0.5  --   --   --  0.5  --  0.5   GLUCOSE 135*  --   --   --  147*  --  150*    < > = values in this interval not displayed. Calcium:  Recent Labs     05/12/22  0450   CALCIUM 7.9*     Magnesium:  Recent Labs     05/11/22  2100   MG 1.4*     Glucose:No results for input(s): POCGLU in the last 72 hours. HgbA1C: No results for input(s): LABA1C in the last 72 hours. Lipids: No results for input(s): CHOL, TRIG, HDL, LDL, LDLCALC in the last 72 hours. Radiology reports as per the Radiologist  Radiology: CT HEAD WO CONTRAST    Result Date: 5/10/2022  PROCEDURE: CT HEAD WO CONTRAST CLINICAL INFORMATION: fr surgery planning. COMPARISON: MRI brain dated 5/6/2022. TECHNIQUE: Helical CT of the head with financial marker utilizing [employer] protocol with axial, sagittal and coronal reconstructions.  All CT scans at this facility use dose modulation, iterative reconstruction, and/or weight-based dosing when appropriate to reduce radiation dose to as low as reasonably achievable. FINDINGS: Redemonstration of a left parietal craniotomy with underlying resection cavity. There is an irregular complex multilobulated cystic lesion involving the resection cavity, better characterized on prior MRI. Hypodensity surrounding the lesion corresponds to expansile T2/FLAIR signal seen on previous MRI. There is stable mass effect on the right occipital horn. No midline shift is identified. Ventricular size is stable. 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 Orbits are otherwise unremarkable. Paranasal sinuses and mastoid air cells are clear. [employer] CT of the head for surgical planning redemonstrating right parietal craniotomy with complex cystic mass subjacent involving the prior resection cavity with adjacent mild expansile hypodensity, better characterized on previous MRI and causing stable mild mass effect on the occipital horn of the right lateral ventricle. **This report has been created using voice recognition software. It may contain minor errors which are inherent in voice recognition technology. ** Final report electronically signed by Dr. Dario Dave MD on 5/10/2022 10:35 AM                   Assessment: Status postoperative day #1 from reexploration and revision of craniotomy for resection of recurrent brain mass performed Dr. Osei Youngblood, without complication    Principal Problem:    Brain mass  Active Problems:    Brain metastases ([address]  Resolved Problems:    * No resolved hospital problems. *        Plan: Patient is seen and evaluated in the ICU setting with evaluation exam findings reviewed and discussed with Dr. Osei Youngblood and with nursing.   Patient resting comfortably this morning postoperative day #1 from reexploration and revision of craniotomy for resection of recurrent brain lesion performed Dr. Osei Youngblood, without complication. She is resting comfortably this morning with pain very well controlled and presents with dressings intact over flat dry incisions and a surgical drain intact and functioning approximately 100 cc collected per shift. Postoperative CT scanning was considered stable. Neurosurgery recommends PT and OT as tolerated with up to a chair as tolerated.   Neurosurgery to follow      Electronically signed by Holly Field PA-C on 5/12/2022 at 7:44 AM    Neurosurgery

## 2022-05-12 NOTE — PLAN OF CARE
Problem: Discharge Planning  Goal: Discharge to home or other facility with appropriate resources  Description: Discharge planning in progress. Outcome: Progressing  Flowsheets (Taken 5/12/2022 0900)  Discharge to home or other facility with appropriate resources:   Identify barriers to discharge with patient and caregiver   Arrange for needed discharge resources and transportation as appropriate   Identify discharge learning needs (meds, wound care, etc)     Problem: Discharge Planning  Goal: Discharge to home or other facility with appropriate resources  Description: Discharge planning in progress. Outcome: Progressing  Flowsheets (Taken 5/12/2022 0900)  Discharge to home or other facility with appropriate resources:   Identify barriers to discharge with patient and caregiver   Arrange for needed discharge resources and transportation as appropriate   Identify discharge learning needs (meds, wound care, etc)     Problem: Safety - Adult  Goal: Free from fall injury  Description: Family at bedside. Bed alarm activated. Outcome: Progressing  Flowsheets (Taken 5/12/2022 1042)  Free From Fall Injury: Instruct family/caregiver on patient safety     Problem: Chronic Conditions and Co-morbidities  Goal: Patient's chronic conditions and co-morbidity symptoms are monitored and maintained or improved  Description: Monitoring co-morbidities. Outcome: Progressing  Flowsheets (Taken 5/12/2022 0900)  Care Plan - Patient's Chronic Conditions and Co-Morbidity Symptoms are Monitored and Maintained or Improved:   Monitor and assess patient's chronic conditions and comorbid symptoms for stability, deterioration, or improvement   Collaborate with multidisciplinary team to address chronic and comorbid conditions and prevent exacerbation or deterioration     Problem: ABCDS Injury Assessment  Goal: Absence of physical injury  Description: No physical injury noted.    Outcome: Progressing  Flowsheets (Taken 5/12/2022 1042)  Absence of Physical Injury: Implement safety measures based on patient assessment     Problem: Skin/Tissue Integrity  Goal: Absence of new skin breakdown  Description: 1. Monitor for areas of redness and/or skin breakdown  2. Assess vascular access sites hourly  3. Every 4-6 hours minimum:  Change oxygen saturation probe site  4. Every 4-6 hours:  If on nasal continuous positive airway pressure, respiratory therapy assess nares and determine need for appliance change or resting period. Outcome: Progressing  Note: No evidence of new skin breakdown noted. Continue to assist with turns Q2H and as needed. Care plan reviewed with patient. Patient verbalizes understanding of the plan of care and contributes to goal setting.

## 2022-05-12 NOTE — PROGRESS NOTES
CRITICAL CARE PROGRESS NOTE      Patient:  [facility]    Unit/Bed:4D-08/008-A  YOB: 1933  MRN: [id number]   PCP: Amy Hernandez MD  Date of Admission: 5/6/2022  Chief Complaint:- headache     Assessment and Plan:       1. Right-sided brain masses: Noted CT head.  MRI shows mass with cerebral edema.  Neurosurgery consulted.  Patient does have history of cancer with metastatic disease.  Maintain systolic blood pressure 161-272.  Neurochecks.  Patient was on chronic aspirin, on hold.  TEG and platelet mapping normal.  Keppra for seizure prophylaxis. Status post surgical resection with Dr. Oj Barros on 5/11. Awaiting pathology. Oncology following. 2. Cerebral edema: 3% saline and Decadron.  Goal sodium 145-150, seizure precautions. Sodium 147  3. History of breast cancer: With mets to the liver.  Diagnosed in 2016.  Completed chemotherapy.  She follows with Dr. Abhi Bach as outpatient, now seeing Dr. Tracy Matthews, Dr. Abhi Bach retired   4. History of temporal arteritis: Noted history.  On Decadron.  Prostatic right eye, secondary to vision loss  5. Hypertension: Continue Imdur  6. Hyperlipidemia: Lipitor  7. GERD: PPI  8. Acute blood loss normocytic anemia: Hemoglobin 10.8, hematocrit 34.9. Monitor. No active signs of bleeding. Patient underwent surgery on 5/11 with an EBL of some 100 cc. 9. Hypothyroidism: Continue Synthroid  10. Anxiety: Zoloft  11. Acute hyperglycemia: Likely secondary to steroid use. Last A1c noted at 5.7 on 6/15/2021. Monitor need for sliding scale insulin. 12. Acute hypoxic respiratory failure:  resolved; Likely secondary to atelectasis.  Mild concern on chest x-ray for new infiltrate.  Patient has no fever no leukocytosis no cough no sputum production.  Increase activity and up to chair.  Incentive spirometr  13. Thrombocytopenia: Platelets 349, monitor.  No signs of bleeding.   14. Paroxysmal atrial fibrillation: Patient had sudden onset atrial fibrillation overnight postoperatively. Patient was given amiodarone and Lopressor. She did convert to normal sinus rhythm. Patient was unable to be anticoagulated secondary to recent brain surgery. [id number] 2 for. Due to her advanced age and recent surgery she has not candidate for anticoagulation. This appears to be an isolated situation likely secondary to stress of surgery. Monitor. ECHO pending      INITIAL H AND P AND ICU COURSE:  Latanya Rod is an 69-year-old white female who presented to [facility] on 5/6/2022 for lower extremity numbness headaches and nausea and vomiting. 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  Per report patient presents that for the last 2 weeks she had had progressive headache she reported 3-4 headaches a week she is not [place] difficulty sleeping she stated that she has had nausea and vomiting that started a few days prior.  She endorsed feeling fatigued.  She denied any dysuria or urgency but did states she had decreased urine output.  In the emergency department labs were normal.  Troponin was negative.  EKG was completed which was negative.  She underwent CT head which showed cerebral edema with a right temporal occipital parietal mass.  Consult to neurosurgery Dr. Erik Mosquera recommended admission to the ICU for close neurological monitoring.  Patient was started on Decadron and hypertonic saline.  MRI brain was ordered.  Patient was admitted to ICU in stable condition. 5/12 patient underwent surgical resection on 5/11. She did have an episode of atrial fib with RVR overnight.   This has since resolved.     Past Medical History: per HPI  Family History: Sister: Breast cancer Brother: Lung cancer, colon cancer  Social History: Reformed smoker, denies alcohol use, denies drug use. Review of Systems   Constitutional: Negative for fatigue and fever. HENT: Negative for sore throat and trouble swallowing. Eyes: Positive for visual disturbance (chronic). Negative for photophobia. Respiratory: Negative for chest tightness, shortness of breath and wheezing. Cardiovascular: Negative for chest pain and leg swelling. Gastrointestinal: Negative for abdominal pain, nausea and vomiting. Endocrine: Negative for polydipsia and polyphagia. Genitourinary: Negative for decreased urine volume and dysuria. Musculoskeletal: Positive for neck pain. Negative for back pain. Skin: Negative for color change, pallor and rash. Allergic/Immunologic: Negative for food allergies. Neurological: Negative for dizziness, weakness and numbness. Hematological: Negative for adenopathy. Psychiatric/Behavioral: Negative for agitation and confusion. The patient is not nervous/anxious. Scheduled Meds:   amiodarone  150 mg IntraVENous Once    miconazole   Topical BID    levETIRAcetam  500 mg Oral BID    isosorbide mononitrate  30 mg Oral Daily    docusate sodium  100 mg Oral BID    levothyroxine  100 mcg Oral Daily    pantoprazole  40 mg Oral QAM AC    [Held by provider] aspirin  81 mg Oral Daily    sertraline  50 mg Oral BID    atorvastatin  10 mg Oral Nightly    dexamethasone  4 mg Oral 4 times per day    [Held by provider] enoxaparin  40 mg SubCUTAneous Daily     Continuous Infusions:   amiodarone 0.5 mg/min (05/12/22 0411)    sodium chloride 35 mL/hr (05/12/22 0514)       PHYSICAL EXAMINATION:  T:  98.8. P:  64. RR:  15. B/P:  150/50. FiO2:  0. O2 Sat:  99.  I/O:  3558/3650  Body mass index is 28.37 kg/m². GCS:  15  General: Acute on chronically ill-appearing white female  HEENT:  normocephalic and atraumatic, drain . No scleral icterus. PERR  Neck: supple.   No Thyromegaly. Lungs: clear to auscultation. No retractions  Cardiac: RRR. No JVD. Abdomen: soft. Nontender. Extremities:  No clubbing, cyanosis, or edema x 4. Vasculature: capillary refill < 3 seconds. Palpable dorsalis pedis pulses. Skin:  warm and dry. Psych:  Alert and oriented x3. Affect appropriate  Lymph:  No supraclavicular adenopathy. Neurologic:  No focal deficit. No seizures. Data: (All radiographs, tracings, PFTs, and imaging are personally viewed and interpreted unless otherwise noted).  Sodium 147, potassium 4.0, chloride 112, CO2 24, BUN 15, creatinine 0.5, anion gap 11.0, glucose 150.  WBC 8.9, hemoglobin 10.8, hematocrit 34.9, platelet count 646   Telemetry shows   CT head 5/11/2022 reports status post removal of mass of the right posterior temporal regions. Air in the frontal horn. · CT head 5/6/2022 reports new right temporal edema.  Cystic mass. · Chest x-ray 5/7/2022 reports questionable new infiltrate or pneumonitis in the right lung base. · MRI 5/6/2022 reports large irregular enhancing cystic mass on the right with surrounding edema.  Considerations would be malignancy versus abscess. · EKG 5/6/2022 reports normal sinus rhythm  · Echocardiogram 8/17/2021 reports normal EF 65%, normal LV function. Meets Continued ICU Level Care Criteria:    [x] Yes   [] No - Transfer Planned to listed location:  [] HOSPITALIST CONTACTED-      Case and plan discussed with Dr. Jennie Domínguez and Dr. Ela Wright        Electronically signed by Kathy Caldwell. PAMELA Gipson - CNP  CRITICAL CARE SPECIALIST  Patient seen by me including key components of medical care. Case discussed with NP. Trial off 3% saline IV. Italicized font, if present,  represents changes to the note made by me. CC time 35 minutes. Time was discontiguous. Time does not include procedure. Time does include my direct assessment of the patient and coordination of care.   Time represents more than 50% of the time involved with patient care by the [address] [place] [id number] team.  Electronically signed by Italia Murray.  Roby Luu MD.

## 2022-05-12 NOTE — SIGNIFICANT EVENT
Atrial Fibrillation with RVR -150's  Patient is warm and dry. Positive complaint of \"I feel my heart racing \". Denies any chest pain shortness of breath no diaphoresis. Patient is status post 5/11/2022 right-sided craniotomy for tumor. Positive for recurrence of right sided brain metastatic tumor. Patient has a significant history of SVT  CAD s/p PCI 2003, CVA 2005 no residual, Breast cancer with mets to liver, essential hypertension,   ECHO 2021 LVEF 65, hypothyroidism. EKG was completed  with ST depression globally. Lopressor 5 mg IV push x1 was given. This did bring her heart rate down to 1 44-1 25  Systolic blood pressure did drop to   Amiodarone bolus and drip will be started. Unable to anticoagulate secondary to postoperative state. [id number]    ECHO is pending  Labs pending.    5/7/2022 TSH 0.363 Free T4 1.69

## 2022-05-12 NOTE — CARE COORDINATION
5/12/22, 1:43 PM EDT    DISCHARGE PLANNING EVALUATION    Pt is current with [facility] [employer] for RN. Pt had brain surgery yesterday. ALEX called Marcus Velazquez (pts back up plan) for potential referral, left message with [facility] to return call.

## 2022-05-12 NOTE — FLOWSHEET NOTE
Pt was in bed and was eating. She was dealing with brain mass but was hopeful and loves the Lord. Prayer was appreciated. 05/12/22 1504   Encounter Summary   Service Provided For: Patient   Referral/Consult From: [facility] Family members   Last Encounter  05/12/22   Complexity of Encounter Low   Begin Time 1145   End Time  1152   Total Time Calculated 7 min   Crisis   Type Follow up   Spiritual/Emotional needs   Type Spiritual Support   Assessment/Intervention/Outcome   Assessment Coping; Hopeful   Intervention Active listening;Empowerment

## 2022-05-13 LAB
ANION GAP SERPL CALCULATED.3IONS-SCNC: 8 MEQ/L (ref 8–16)
ANISOCYTOSIS: PRESENT
BASOPHILIA: ABNORMAL
BASOPHILIC STIPPLING: ABNORMAL
BASOPHILS # BLD: 0.2 %
BASOPHILS ABSOLUTE: 0 THOU/MM3 (ref 0–0.1)
BUN BLDV-MCNC: 17 MG/DL (ref 7–22)
CALCIUM SERPL-MCNC: 8 MG/DL (ref 8.5–10.5)
CHLORIDE BLD-SCNC: 106 MEQ/L (ref 98–111)
CO2: 28 MEQ/L (ref 23–33)
CREAT SERPL-MCNC: 0.5 MG/DL (ref 0.4–1.2)
EOSINOPHIL # BLD: 1.8 %
EOSINOPHILS ABSOLUTE: 0.1 THOU/MM3 (ref 0–0.4)
ERYTHROCYTE [DISTWIDTH] IN BLOOD BY AUTOMATED COUNT: 14.6 % (ref 11.5–14.5)
ERYTHROCYTE [DISTWIDTH] IN BLOOD BY AUTOMATED COUNT: 49.4 FL (ref 35–45)
GFR SERPL CREATININE-BSD FRML MDRD: > 90 ML/MIN/1.73M2
GLUCOSE BLD-MCNC: 104 MG/DL (ref 70–108)
GLUCOSE BLD-MCNC: 107 MG/DL (ref 70–108)
GLUCOSE BLD-MCNC: 130 MG/DL (ref 70–108)
GLUCOSE BLD-MCNC: 135 MG/DL (ref 70–108)
GLUCOSE BLD-MCNC: 85 MG/DL (ref 70–108)
HCT VFR BLD CALC: 31.5 % (ref 37–47)
HEMOGLOBIN: 9.7 GM/DL (ref 12–16)
IMMATURE GRANS (ABS): 0.07 THOU/MM3 (ref 0–0.07)
IMMATURE GRANULOCYTES: 1.2 %
LYMPHOCYTES # BLD: 13 %
LYMPHOCYTES ABSOLUTE: 0.8 THOU/MM3 (ref 1–4.8)
MCH RBC QN AUTO: 28.8 PG (ref 26–33)
MCHC RBC AUTO-ENTMCNC: 30.8 GM/DL (ref 32.2–35.5)
MCV RBC AUTO: 93.5 FL (ref 81–99)
MONOCYTES # BLD: 9.2 %
MONOCYTES ABSOLUTE: 0.6 THOU/MM3 (ref 0.4–1.3)
NUCLEATED RED BLOOD CELLS: 0 /100 WBC
PLATELET # BLD: 92 THOU/MM3 (ref 130–400)
PLATELET ESTIMATE: ABNORMAL
PMV BLD AUTO: 12 FL (ref 9.4–12.4)
POTASSIUM SERPL-SCNC: 3.4 MEQ/L (ref 3.5–5.2)
RBC # BLD: 3.37 MILL/MM3 (ref 4.2–5.4)
SCAN OF BLOOD SMEAR: NORMAL
SEG NEUTROPHILS: 74.6 %
SEGMENTED NEUTROPHILS ABSOLUTE COUNT: 4.6 THOU/MM3 (ref 1.8–7.7)
SODIUM BLD-SCNC: 142 MEQ/L (ref 135–145)
WBC # BLD: 6.1 THOU/MM3 (ref 4.8–10.8)

## 2022-05-13 PROCEDURE — 2580000003 HC RX 258: Performed by: PHYSICIAN ASSISTANT

## 2022-05-13 PROCEDURE — 6360000002 HC RX W HCPCS: Performed by: PHYSICIAN ASSISTANT

## 2022-05-13 PROCEDURE — 6360000002 HC RX W HCPCS: Performed by: NURSE PRACTITIONER

## 2022-05-13 PROCEDURE — 6370000000 HC RX 637 (ALT 250 FOR IP): Performed by: PHYSICIAN ASSISTANT

## 2022-05-13 PROCEDURE — 99024 POSTOP FOLLOW-UP VISIT: CPT | Performed by: NEUROLOGICAL SURGERY

## 2022-05-13 PROCEDURE — 2500000003 HC RX 250 WO HCPCS: Performed by: NURSE PRACTITIONER

## 2022-05-13 PROCEDURE — 97535 SELF CARE MNGMENT TRAINING: CPT

## 2022-05-13 PROCEDURE — 99233 SBSQ HOSP IP/OBS HIGH 50: CPT | Performed by: INTERNAL MEDICINE

## 2022-05-13 PROCEDURE — 94761 N-INVAS EAR/PLS OXIMETRY MLT: CPT

## 2022-05-13 PROCEDURE — 8E09XBZ COMPUTER ASSISTED PROCEDURE OF HEAD AND NECK REGION: ICD-10-PCS | Performed by: NEUROLOGICAL SURGERY

## 2022-05-13 PROCEDURE — APPSS30 APP SPLIT SHARED TIME 16-30 MINUTES: Performed by: PHYSICIAN ASSISTANT

## 2022-05-13 PROCEDURE — 2060000000 HC ICU INTERMEDIATE R&B

## 2022-05-13 PROCEDURE — 6370000000 HC RX 637 (ALT 250 FOR IP): Performed by: NURSE PRACTITIONER

## 2022-05-13 PROCEDURE — 2700000000 HC OXYGEN THERAPY PER DAY

## 2022-05-13 PROCEDURE — 82948 REAGENT STRIP/BLOOD GLUCOSE: CPT

## 2022-05-13 PROCEDURE — 00B00ZZ EXCISION OF BRAIN, OPEN APPROACH: ICD-10-PCS | Performed by: NEUROLOGICAL SURGERY

## 2022-05-13 PROCEDURE — 94640 AIRWAY INHALATION TREATMENT: CPT

## 2022-05-13 PROCEDURE — 80048 BASIC METABOLIC PNL TOTAL CA: CPT

## 2022-05-13 PROCEDURE — 97166 OT EVAL MOD COMPLEX 45 MIN: CPT

## 2022-05-13 PROCEDURE — 85025 COMPLETE CBC W/AUTO DIFF WBC: CPT

## 2022-05-13 RX ORDER — POTASSIUM CHLORIDE 20 MEQ/1
40 TABLET, EXTENDED RELEASE ORAL PRN
Status: DISCONTINUED | OUTPATIENT
Start: 2022-05-13 | End: 2022-05-19 | Stop reason: HOSPADM

## 2022-05-13 RX ORDER — METOPROLOL TARTRATE 5 MG/5ML
2.5 INJECTION INTRAVENOUS ONCE
Status: COMPLETED | OUTPATIENT
Start: 2022-05-13 | End: 2022-05-13

## 2022-05-13 RX ORDER — POTASSIUM CHLORIDE 7.45 MG/ML
10 INJECTION INTRAVENOUS PRN
Status: DISCONTINUED | OUTPATIENT
Start: 2022-05-13 | End: 2022-05-19 | Stop reason: HOSPADM

## 2022-05-13 RX ORDER — ALBUTEROL SULFATE 2.5 MG/3ML
2.5 SOLUTION RESPIRATORY (INHALATION) 2 TIMES DAILY
Status: DISCONTINUED | OUTPATIENT
Start: 2022-05-13 | End: 2022-05-16

## 2022-05-13 RX ORDER — AMIODARONE HYDROCHLORIDE 200 MG/1
200 TABLET ORAL DAILY
Status: DISCONTINUED | OUTPATIENT
Start: 2022-05-13 | End: 2022-05-19 | Stop reason: HOSPADM

## 2022-05-13 RX ADMIN — PREDNISONE 1 MG: 1 TABLET ORAL at 08:41

## 2022-05-13 RX ADMIN — ALPRAZOLAM 0.25 MG: 0.25 TABLET ORAL at 22:04

## 2022-05-13 RX ADMIN — MICONAZOLE NITRATE: 20 POWDER TOPICAL at 08:43

## 2022-05-13 RX ADMIN — AMIODARONE HYDROCHLORIDE 0.5 MG/MIN: 1.8 INJECTION, SOLUTION INTRAVENOUS at 05:51

## 2022-05-13 RX ADMIN — AMIODARONE HYDROCHLORIDE 200 MG: 200 TABLET ORAL at 08:42

## 2022-05-13 RX ADMIN — METOPROLOL TARTRATE 25 MG: 25 TABLET, FILM COATED ORAL at 10:09

## 2022-05-13 RX ADMIN — FENTANYL CITRATE 50 MCG: 50 INJECTION, SOLUTION INTRAMUSCULAR; INTRAVENOUS at 03:36

## 2022-05-13 RX ADMIN — SODIUM CHLORIDE, PRESERVATIVE FREE 10 ML: 5 INJECTION INTRAVENOUS at 08:43

## 2022-05-13 RX ADMIN — PANTOPRAZOLE SODIUM 40 MG: 40 TABLET, DELAYED RELEASE ORAL at 05:12

## 2022-05-13 RX ADMIN — Medication 1 TABLET: at 08:41

## 2022-05-13 RX ADMIN — HYDRALAZINE HYDROCHLORIDE 10 MG: 20 INJECTION, SOLUTION INTRAMUSCULAR; INTRAVENOUS at 08:42

## 2022-05-13 RX ADMIN — TRAZODONE HYDROCHLORIDE 50 MG: 50 TABLET ORAL at 20:36

## 2022-05-13 RX ADMIN — DOCUSATE SODIUM 100 MG: 100 CAPSULE, LIQUID FILLED ORAL at 08:42

## 2022-05-13 RX ADMIN — CETIRIZINE HYDROCHLORIDE 5 MG: 5 TABLET ORAL at 08:41

## 2022-05-13 RX ADMIN — SERTRALINE 50 MG: 50 TABLET, FILM COATED ORAL at 08:41

## 2022-05-13 RX ADMIN — Medication 2000 MG: at 09:49

## 2022-05-13 RX ADMIN — SODIUM CHLORIDE, PRESERVATIVE FREE 10 ML: 5 INJECTION INTRAVENOUS at 20:36

## 2022-05-13 RX ADMIN — POLYETHYLENE GLYCOL 3350 17 G: 17 POWDER, FOR SOLUTION ORAL at 20:35

## 2022-05-13 RX ADMIN — ISOSORBIDE MONONITRATE 30 MG: 30 TABLET, EXTENDED RELEASE ORAL at 08:41

## 2022-05-13 RX ADMIN — Medication 2000 MG: at 03:30

## 2022-05-13 RX ADMIN — LEVETIRACETAM 500 MG: 500 TABLET, FILM COATED ORAL at 08:41

## 2022-05-13 RX ADMIN — DOCUSATE SODIUM 100 MG: 100 CAPSULE, LIQUID FILLED ORAL at 20:36

## 2022-05-13 RX ADMIN — METOPROLOL TARTRATE 25 MG: 25 TABLET, FILM COATED ORAL at 20:35

## 2022-05-13 RX ADMIN — LEVETIRACETAM 500 MG: 500 TABLET, FILM COATED ORAL at 20:35

## 2022-05-13 RX ADMIN — ONDANSETRON 4 MG: 2 INJECTION INTRAMUSCULAR; INTRAVENOUS at 07:26

## 2022-05-13 RX ADMIN — ATORVASTATIN CALCIUM 10 MG: 10 TABLET, FILM COATED ORAL at 20:35

## 2022-05-13 RX ADMIN — ALBUTEROL SULFATE 2.5 MG: 2.5 SOLUTION RESPIRATORY (INHALATION) at 08:15

## 2022-05-13 RX ADMIN — Medication 2000 MG: at 17:58

## 2022-05-13 RX ADMIN — LEVOTHYROXINE SODIUM 100 MCG: 0.1 TABLET ORAL at 05:13

## 2022-05-13 RX ADMIN — ALBUTEROL SULFATE 2.5 MG: 2.5 SOLUTION RESPIRATORY (INHALATION) at 18:22

## 2022-05-13 RX ADMIN — SERTRALINE 50 MG: 50 TABLET, FILM COATED ORAL at 20:36

## 2022-05-13 RX ADMIN — METOPROLOL TARTRATE 2.5 MG: 1 INJECTION, SOLUTION INTRAVENOUS at 09:43

## 2022-05-13 RX ADMIN — POTASSIUM BICARBONATE 40 MEQ: 782 TABLET, EFFERVESCENT ORAL at 08:41

## 2022-05-13 RX ADMIN — MICONAZOLE NITRATE: 20 POWDER TOPICAL at 20:43

## 2022-05-13 ASSESSMENT — PAIN SCALES - GENERAL
PAINLEVEL_OUTOF10: 0
PAINLEVEL_OUTOF10: 10
PAINLEVEL_OUTOF10: 0

## 2022-05-13 ASSESSMENT — ENCOUNTER SYMPTOMS
PHOTOPHOBIA: 0
WHEEZING: 0
SHORTNESS OF BREATH: 0
SORE THROAT: 0
ABDOMINAL PAIN: 0
COLOR CHANGE: 0
CHEST TIGHTNESS: 0
BACK PAIN: 0
TROUBLE SWALLOWING: 0
NAUSEA: 0
VOMITING: 0

## 2022-05-13 ASSESSMENT — PAIN DESCRIPTION - LOCATION: LOCATION: HEAD

## 2022-05-13 NOTE — PLAN OF CARE
31-year-old female presented to [facility] emergency room department 5/6/2022 with lower extremity numbness, severe headache as well as nausea and vomiting. Patient has significant past medical history of breast cancer 2016 that metastasized to liver and brain 7/2021 (s/p craniotomy for tumor resection/debulking), temporal arteritis resulting in right eye blindness, left carotid artery stenosis, hyperlipidemia, GERD, hypothyroidism as well as vitamin D deficiency. Patient expressed that for the previous 2 weeks she has had progressive worsening headaches that resulted in difficulty sleeping as well as associated nausea vomiting within the last few days. Patient also had fatigue at this time. While patient was worked up in the emergency department labs were relatively unremarkable however CT head did demonstrate new cerebral edema more significant in the temporal occipital and parietal lobes of the right side. There is also noted to be an underlying cystic mass in which Dr. Raffy Anand, neurosurgery was consulted. Patient was admitted to the ICU 5/6/2022 for close monitoring. Upon arrival to the ICU patient was started on Decadron as well as hypertonic saline. An MRI was obtained. Patient underwent right-sided craniotomy 5/11/2022 by Dr. Raffy Anand, neurosurgeon. Subsequently that evening patient did develop atrial fibrillation with rapid ventricular response in which amiodarone bolus and drip were initiated and Lopressor 5 mg IV was given. And at that point patient did convert to normal sinus rhythm. Given resolution of atrial fibrillation and patient being stable patient was okay to be transition to ICU stepdown 5/13/2021. Brain mass: Patient had right-sided metastatic brain tumor resection/debulking 7/2021 by Dr. Raffy Anand, neurosurgeon. Patient presented with worsening headaches, nausea and vomiting. Patient found to have cerebral edema.   Patient was given hypertonic saline as well as started on Decadron and Keppra for seizure prophylaxis. History of breast cancer with mets: Diagnosed with breast cancer 2016 with mets to the liver. Patient was found to have mets to the brain in which she underwent right-sided metastatic tumor resection/debulking 7/2021 patient was following with Dr. Alina Rush, and now follows with Dr. Dalia Morales. Paroxysmal atrial fibrillation: Found 5/11/2022. Patient has [id number] Score: 2  And given recent surgery patient not a good candidate for anticoagulation therapy at this time. Patient did have an echo 5/12/2022 that demonstrated EF 65-70% with dilated dilated left atrium and right ventricular systolic pressure 55 mmHg  Temporal arteritis: Right side resulting in right eye blindness. Hyperlipidemia: Continue Lipitor   Hypothyroidism: Continue Synthroid  Anxiety: Continue Zoloft    Patient denies any pain or complaints   Alert and oriented x3. Right eye is unreactive and dose not track   Lungs demonstrate intermittent expiratory wheezing bilaterally   Heart is currently normal sinus rhythm with regular rate   Abdominal sounds present, no tenderness to palpation  Lower extremities have no edema, no cyanosis  Skin is warm and dry, clean dry dressing on head with cali drain present       Of note MRI brain with and without contrast performed 5/13/2022 did demonstrate abnormal signaling right posterior temporal lobe consistent with postoperative changes and hemorrhage at surgical site. A message was sent to neurosurgery Kathleen DUNAWAY to confirm that patient is still safe for stepdown. Awaiting response.     Electronically signed by Tasia Baker DO on 5/13/2022 at 5:00 PM

## 2022-05-13 NOTE — PROGRESS NOTES
Sandra Copeland 60  PHYSICAL THERAPY MISSED TREATMENT NOTE  [facility] ICU 4D    Date: 2022  Patient Name: Barrett Tong        MRN: [id number]   : 1933  (80 y.o.)  Gender: female                REASON FOR MISSED TREATMENT:  Hold treatment per nursing request.  RN requesting to hold PT evaluation this date as pt was in Afib RVR earlier this date. PT to evaluate as able and pt medically appropriate.      Vanna Amor PT, DPT

## 2022-05-13 NOTE — PROGRESS NOTES
Attending Note:     Patient was seen and examined by me in ICU in conjunction with neurosurgery EMELYN Lira PA-C).  Discussed with patient, his nurse and the  ICU team. All data and imaging reviewed by myself. I agree with examination assessment and plan as documented below.      -Doing well.  -No neuro no new neurological deficit (in fact patient reported an improvement in her vision)  -Postop brain CT showed expected postoperative changes.  -Up to the chair. -PT and OT.  -Continue the current medical management per the ICU team.  -Neurosurgery will follow.  Uriel Bazan MD      Neurosurgery Progress Note    Patient:  Meliton Tello      Unit/Bed:4D-08/008-A    YOB: 1933    MRN: [id number]     Acct: [de-identified]     Admit date: 5/6/2022    Chief Complaint   Patient presents with    Abdominal Pain       Patient Seen, Chart, Physician notes, Labs, Radiology studies reviewed. Subjective: Patient is seen and evaluated in the ICU setting with evaluation exam findings reviewed and discussed with Dr. Olga Haywood and with nursing. Patient is resting comfortably postoperative day #2 from reexploration and revision of craniotomy for resection of recurrence of intercranial lesion performed Dr. Olga Haywood, without complication. She is resting comfortably as he sits up in bed enjoying breakfast with pain well-controlled. Past, Family, Social History unchanged from admission. Diet:  ADULT DIET;  Regular    Medications:  Scheduled Meds:   amiodarone  200 mg Oral Daily    albuterol  2.5 mg Nebulization BID    calcium-cholecalciferol  1 tablet Oral Daily    lidocaine  2 patch Topical Daily    traZODone  50 mg Oral Nightly    cetirizine  5 mg Oral Daily    predniSONE  1 mg Oral Daily    sodium chloride flush  5-40 mL IntraVENous 2 times per day    ceFAZolin  2,000 mg IntraVENous Q8H    insulin lispro  0-6 Units SubCUTAneous TID WC    insulin lispro  0-3 Units SubCUTAneous Nightly    miconazole   Topical BID    levETIRAcetam  500 mg Oral BID    isosorbide mononitrate  30 mg Oral Daily    docusate sodium  100 mg Oral BID    levothyroxine  100 mcg Oral Daily    pantoprazole  40 mg Oral QAM AC    [Held by provider] aspirin  81 mg Oral Daily    sertraline  50 mg Oral BID    atorvastatin  10 mg Oral Nightly    [Held by provider] enoxaparin  40 mg SubCUTAneous Daily     Continuous Infusions:   sodium chloride      dextrose       PRN Meds:potassium chloride **OR** potassium alternative oral replacement **OR** potassium chloride, ALPRAZolam, sodium chloride flush, sodium chloride, magnesium sulfate, glucose, dextrose bolus **OR** dextrose bolus, glucagon (rDNA), dextrose, albuterol, fentanNYL, fentanNYL, hydrALAZINE, HYDROcodone 5 mg - acetaminophen **OR** HYDROcodone 5 mg - acetaminophen, phenol, melatonin, polyethylene glycol, ondansetron **OR** ondansetron    Objective: Patient is observed sitting up in bed with head of the bed elevated 30 degrees and with pain well-controlled. Dressings are intact over flat dry incision with a dressing change anticipated for today corresponding with postoperative day 2 from her procedure. Remained stable and neurologically intact to her baseline on exam this morning with no additional significant changes noted overnight. She presents with clear appropriate speech and again participated in discussions involving her care appropriately. Vitals: BP (!) 169/60   Pulse 68   Temp 98.1 °F (36.7 °C) (Oral)   Resp 21   Ht 5' (1.524 m)   Wt 145 lb 4.5 oz (65.9 kg)   LMP  (LMP Unknown) Comment: age 52 ovaries intact  SpO2 94%   BMI 28.37 kg/m²     Physical Exam     She remains stable neurologically intact to her baseline on exam this morning with no additional significant changes noted overnight. Physical Exam:  Alert and attentive. Language appropriate, with no aphasia. Pupils equal.  Facial strength symmetric.     Review of Systems     Patient relates mild incisional site discomfort but was absent headache at the time of exam.  Patient did describe a mild headache immediately following the procedure which resolved overnight. She denies chest pain, shortness of breath nausea or vomiting. The remaining 12 points of review of systems otherwise negative. 24 hour intake/output:    Intake/Output Summary (Last 24 hours) at 5/13/2022 0939  Last data filed at 5/13/2022 0729  Gross per 24 hour   Intake 2045.65 ml   Output 1185 ml   Net 860.65 ml     Last 3 weights: Wt Readings from Last 3 Encounters:   05/12/22 145 lb 4.5 oz (65.9 kg)   05/06/22 134 lb (60.8 kg)   01/22/22 134 lb (60.8 kg)         CBC:   Recent Labs     05/11/22  0330 05/11/22  0330 05/12/22  0015 05/12/22  0450 05/13/22  0515   WBC 8.5  --   --  8.9 6.1   HGB 12.6   < > 10.8* 10.8* 9.7*     --   --  106* 92*    < > = values in this interval not displayed. BMP:    Recent Labs     05/11/22  2100 05/11/22  2100 05/12/22  0015 05/12/22  0450 05/13/22  0515      < > 145 147* 142   K 3.7  --   --  4.0 3.4*     --   --  112* 106   CO2 25  --   --  24 28   BUN 13  --   --  15 17   CREATININE 0.5  --   --  0.5 0.5   GLUCOSE 147*  --   --  150* 107    < > = values in this interval not displayed. Calcium:  Recent Labs     05/13/22  0515   CALCIUM 8.0*     Magnesium:  Recent Labs     05/12/22  1045   MG 2.1     Glucose:  Recent Labs     05/12/22  1739 05/12/22  2113 05/13/22  0738   POCGLU 182* 155* 85     HgbA1C: No results for input(s): LABA1C in the last 72 hours. Lipids: No results for input(s): CHOL, TRIG, HDL, LDL, LDLCALC in the last 72 hours. Radiology reports as per the Radiologist  Radiology: CT HEAD WO CONTRAST    Result Date: 5/10/2022  PROCEDURE: CT HEAD WO CONTRAST CLINICAL INFORMATION: fr surgery planning. COMPARISON: MRI brain dated 5/6/2022.  TECHNIQUE: Helical CT of the head with financial marker utilizing Codenomicon protocol with and revision of craniotomy for resection of recurrent brain lesion performed Dr. James Mann, without complication. She is observed sitting up in bed this morning with head of the bed elevated and pain very well controlled with dressings intact over flat dry incisions and the surgical drain intact and functioning this morning at 135 cc per shift. Neurosurgery recommends PT and OT with up to a chair as tolerated along with a dressing change indicated postoperative day 2.   Neurosurgery to follow      Electronically signed by Kevin Vargas PA-C on 5/13/2022 at 9:39 AM    Neurosurgery

## 2022-05-13 NOTE — PROGRESS NOTES
[address] ([place] [address]) THERAPY  [facility] ICU 4D  EVALUATION    Time:   Time In: 6120  Time Out: 1027  Timed Code Treatment Minutes: 9 Minutes  Minutes: 19          Date: 2022  Patient Name: Matthew Haines,   Gender: female      MRN: [id number]  : 1933  (80 y.o.)  Referring Practitioner: Molly Goodwin PA-C  Diagnosis: Brain mass  Additional Pertinent Hx: Per chart review, \"Latanya Rod is an 80-year-old white female who presented to [facility] on 2022 for lower extremity numbness headaches and nausea and vomiting. 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 Per report patient presents that for the last 2 weeks she had had progressive headache she reported 3-4 headaches a week she is not [place] difficulty sleeping she stated that she has had nausea and vomiting that started a few days prior. She endorsed feeling fatigued. She denied any dysuria or urgency but did states she had decreased urine output. In the emergency department labs were normal.  Troponin was negative. EKG was completed which was negative. She underwent CT head which showed cerebral edema with a right temporal occipital parietal mass. Consult to neurosurgery Dr. Milo Salinas. He recommended admission to the ICU for close neurological monitoring. Patient was started on Decadron and hypertonic saline. MRI brain was ordered. Patient was admitted to ICU in stable condition.  patient underwent surgical resection on . She did have an episode of atrial fib with RVR overnight. This has since resolved.  patient is awake and following commands.   Patient is stable for transfer to neuro stepdown. \"    Restrictions/Precautions:  Restrictions/Precautions: General Precautions,Fall Risk,Surgical Protocols  Position Activity Restriction  Other position/activity restrictions: watch HR    Subjective  Chart Reviewed: Yes,Orders,Progress Notes,History and Physical,Imaging,Labs,Operative Notes  Patient assessed for rehabilitation services?: Yes  Family / Caregiver Present: No    Subjective: RN approved session, reported AFIB and low HR and requested patient remain in bed for activities. Patient supine in bed upon OT arrival, A&Ox4 and agreeable to session. Pain: No pain reported. Vitals: Blood Pressure: 136/54  Oxygen: 2L O2, O2 stats ranged from 95%-98%  Heart Rate: 70-78 BPM    Social/Functional History:  Lives With: Family (patients brother and neice will be staying with her.)  Type of Home: House  Home Layout: One level  Home Access: Level entry  Home Equipment: Hospital bed,Oxygen (hospital bed and [facility].)   Bathroom Shower/Tub: Tub/Shower unit  Bathroom Toilet: Handicap height  Bathroom Equipment: Grab bars in shower,Shower chair  Bathroom Accessibility: Accessible    Receives Help From: [employer])  ADL Assistance: Needs assistance  Homemaking Responsibilities: No  Ambulation Assistance: Needs assistance  Transfer Assistance: Independent    Active : No  Patient's  Info: family  Occupation: Retired       VISION:Visual deficits noted, patient reported increased visual ability after procedure. HEARING:  WFL    COGNITION: WFL    RANGE OF MOTION:  Bilateral Upper Extremity:  WFL    STRENGTH:  Bilateral Upper Extremity:  WFL    SENSATION:   WFL    ADL:   Grooming: Minimal Assistance. and set up. patient completed oral hygiene routine while in bed. Derek Flores BALANCE:  Not tested, RN requested in bed eval due to AFIB. BED MOBILITY:  Not Tested    TRANSFERS:  OTR to assess. FUNCTIONAL MOBILITY:  OTR to assess. Activity Tolerance:  Patient tolerance of  treatment: fair.  Patient procurement,Patient/Caregiver education & training,Safety education & training,Self-Care / ADL. See long-term goal time frame for expected duration of plan of care. If no long-term goals established, a short length of stay is anticipated. Goals:  Patient goals : return home  Short Term Goals  Time Frame for Short term goals: upon discharge  Short Term Goal 1: OTR to assess sit to stand t/f and functional mobility. Short Term Goal 2: Patient will complete ADL routine with AE prn. Short Term Goal 3: Patient will learn compensatory strategies for scanning environment and locating needed item with 0-1 verbal cues to increase ADL success and safety. Following session, patient left in safe position with all fall risk precautions in place.

## 2022-05-13 NOTE — PLAN OF CARE
Problem: Discharge Planning  Goal: Discharge to home or other facility with appropriate resources  Description: Discharge planning in progress. 5/13/2022 1107 by Yovany Oneil RN  Outcome: Progressing  Flowsheets (Taken 5/13/2022 0800)  Discharge to home or other facility with appropriate resources:   Identify barriers to discharge with patient and caregiver   Arrange for needed discharge resources and transportation as appropriate   Identify discharge learning needs (meds, wound care, etc)     Problem: Pain  Goal: Verbalizes/displays adequate comfort level or baseline comfort level  Description: PRN Norco for pain management. Encouraged to report pain on pain scale. 5/13/2022 1107 by Yovany Oneil RN  Outcome: Progressing  Flowsheets (Taken 5/12/2022 2100 by Maria De Jesus Feng RN)  Verbalizes/displays adequate comfort level or baseline comfort level:   Encourage patient to monitor pain and request assistance   Assess pain using appropriate pain scale   Implement non-pharmacological measures as appropriate and evaluate response     Problem: Safety - Adult  Goal: Free from fall injury  Description: Family at bedside. Bed alarm activated. 5/13/2022 1107 by Yovany Oneil RN  Outcome: Progressing  Flowsheets (Taken 5/13/2022 1105)  Free From Fall Injury: Instruct family/caregiver on patient safety     Problem: Chronic Conditions and Co-morbidities  Goal: Patient's chronic conditions and co-morbidity symptoms are monitored and maintained or improved  Description: Monitoring co-morbidities.    5/13/2022 1107 by Yovany Oneil RN  Outcome: Progressing  Flowsheets (Taken 5/13/2022 0800)  Care Plan - Patient's Chronic Conditions and Co-Morbidity Symptoms are Monitored and Maintained or Improved:   Monitor and assess patient's chronic conditions and comorbid symptoms for stability, deterioration, or improvement   Collaborate with multidisciplinary team to address chronic and comorbid conditions and prevent exacerbation or deterioration   Update acute care plan with appropriate goals if chronic or comorbid symptoms are exacerbated and prevent overall improvement and discharge    Problem: Skin/Tissue Integrity  Goal: Absence of new skin breakdown  Description: 1. Monitor for areas of redness and/or skin breakdown  2. Assess vascular access sites hourly  3. Every 4-6 hours minimum:  Change oxygen saturation probe site  4. Every 4-6 hours:  If on nasal continuous positive airway pressure, respiratory therapy assess nares and determine need for appliance change or resting period. 5/13/2022 1107 by Olivia Pruitt RN  Outcome: Progressing  Note: No evidence of breakdown. Patient encouraged to change position every 2 hours or more frequently. Problem: ABCDS Injury Assessment  Goal: Absence of physical injury  Description: No physical injury noted. 5/13/2022 1107 by Olivia Pruitt RN  Outcome: Progressing  Flowsheets (Taken 5/13/2022 1105)  Absence of Physical Injury: Implement safety measures based on patient assessment   Care plan reviewed with patient and family. Patient and family verbalize understanding of the plan of care and contribute to goal setting.

## 2022-05-13 NOTE — ONCOLOGY
Brief Note  Radiation Oncology    Patient seen today in ICU care, lying in bed comfortably, A/O x3. She appears well, is able to communicate effectively and was eating her evening meal in no distress. She is doing very well post operatively at this time. I briefly discussed potential plan of care going forward and the role of palliative radiation therapy treating her recently resected brain metastasis. Will discuss recent MRI brain findings further with neuroradiology prior finalizing treatment plan. Considering whole brain radiation therapy vs stereotactic radiosurgery. Will plan to see her as an outpatient once she has recovered from recent surgery. Please contact radiation oncology for questions/concerns or if her condition were to change or worsen.     Annalise Fowler MD MS

## 2022-05-13 NOTE — PROGRESS NOTES
CRITICAL CARE PROGRESS NOTE      Patient:  Henry Robertson    Unit/Bed:4D-08/008-A  YOB: 1933  MRN: [id number]   PCP: Emily Ghotar MD  Date of Admission: 5/6/2022  Chief Complaint:- headache     Assessment and Plan:       Right-sided brain masses: Noted CT head. MRI shows mass with cerebral edema. Neurosurgery consulted. Patient does have history of cancer with metastatic disease. Maintain systolic blood pressure 600-939. Neurochecks. Patient was on chronic aspirin, on hold. TEG and platelet mapping normal.  Keppra for seizure prophylaxis. Status post surgical resection with Dr. Farnsworth Gravely on 5/11. Awaiting pathology. Oncology following. Cerebral edema: resolved; s/p 3% saline and Decadron. History of breast cancer: With mets to the liver. Diagnosed in 2016. Completed chemotherapy. She follows with Dr. Ximena Urbina as outpatient, now seeing Dr. Boris Guzman, Dr. Ximena Urbina retired   History of temporal arteritis: Noted history. On prednisone  prostatic right eye, secondary to vision loss  Hypertension: Continue Imdur  Hyperlipidemia: Lipitor  GERD: PPI  Acute blood loss normocytic anemia: Hemoglobin 9.7, hematocrit 31.5. Monitor. No active signs of bleeding. Patient underwent surgery on 5/11 with an EBL of some 100 cc. Hypothyroidism: Continue Synthroid  Anxiety: Zoloft  Hypokalemia: Replacement ordered. Acute hyperglycemia: Likely secondary to steroid use. Last A1c noted at 5.7 on 6/15/2021. Monitor need for sliding scale insulin. Acute hypoxic respiratory failure:  resolved; Likely secondary to atelectasis. Mild concern on chest x-ray for new infiltrate. Patient has no fever no leukocytosis no cough no sputum production. Increase activity and up to chair. Incentive spirometr  Thrombocytopenia: Platelets 92, monitor. No signs of bleeding. Paroxysmal atrial fibrillation: Patient had sudden onset atrial fibrillation overnight postoperatively. Patient was given amiodarone and Lopressor. She did convert to normal sinus rhythm. Patient was unable to be anticoagulated secondary to recent brain surgery. [id number] 2 for. Due to her advanced age and recent surgery she has not candidate for anticoagulation. This appears to be an isolated situation likely secondary to stress of surgery. Monitor. ECHO normal      INITIAL H AND P AND ICU COURSE:  Prabhjot Faustin is an 59-year-old white female who presented to [facility] on 5/6/2022 for lower extremity numbness headaches and nausea and vomiting. 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 Per report patient presents that for the last 2 weeks she had had progressive headache she reported 3-4 headaches a week she is not [place] difficulty sleeping she stated that she has had nausea and vomiting that started a few days prior. She endorsed feeling fatigued. She denied any dysuria or urgency but did states she had decreased urine output. In the emergency department labs were normal.  Troponin was negative. EKG was completed which was negative. She underwent CT head which showed cerebral edema with a right temporal occipital parietal mass. Consult to neurosurgery Dr. Janet Sandoval. He recommended admission to the ICU for close neurological monitoring. Patient was started on Decadron and hypertonic saline. MRI brain was ordered. Patient was admitted to ICU in stable condition. 5/12 patient underwent surgical resection on 5/11. She did have an episode of atrial fib with RVR overnight. This has since resolved. 5/13 patient is awake and following commands. Patient is stable for transfer to neuro stepdown.      Past Medical History: per HPI  Family History: Sister: Breast cancer Brother: Lung cancer, colon cancer  Social History: Reformed smoker, denies alcohol use, denies drug use. Review of Systems   Constitutional: Negative for fatigue and fever. HENT: Negative for sore throat and trouble swallowing. Eyes: Positive for visual disturbance (chronic). Negative for photophobia. Respiratory: Negative for chest tightness, shortness of breath and wheezing. Cardiovascular: Negative for chest pain and leg swelling. Gastrointestinal: Negative for abdominal pain, nausea and vomiting. Endocrine: Negative for polydipsia and polyphagia. Genitourinary: Negative for decreased urine volume and flank pain. Musculoskeletal: Negative for back pain and neck pain. Skin: Negative for color change, pallor and rash. Allergic/Immunologic: Negative for food allergies. Neurological: Negative for dizziness, weakness and numbness. Hematological: Negative for adenopathy. Psychiatric/Behavioral: Negative for agitation and confusion. The patient is not nervous/anxious.         Scheduled Meds:   amiodarone  200 mg Oral Daily    calcium-cholecalciferol  1 tablet Oral Daily    lidocaine  2 patch Topical Daily    traZODone  50 mg Oral Nightly    cetirizine  5 mg Oral Daily    predniSONE  1 mg Oral Daily    ciprofloxacin  500 mg Oral BID    sodium chloride flush  5-40 mL IntraVENous 2 times per day    ceFAZolin  2,000 mg IntraVENous Q8H    insulin lispro  0-6 Units SubCUTAneous TID WC    insulin lispro  0-3 Units SubCUTAneous Nightly    miconazole   Topical BID    levETIRAcetam  500 mg Oral BID    isosorbide mononitrate  30 mg Oral Daily    docusate sodium  100 mg Oral BID    levothyroxine  100 mcg Oral Daily    pantoprazole  40 mg Oral QAM AC    [Held by provider] aspirin  81 mg Oral Daily    sertraline  50 mg Oral BID    atorvastatin  10 mg Oral Nightly    [Held by provider] enoxaparin  40 mg SubCUTAneous Daily     Continuous Infusions:   sodium chloride      dextrose PHYSICAL EXAMINATION:  T:  97.7. P:  59. RR:  10. B/P:  131/61. FiO2:  2. O2 Sat:  96.  I/O:  1778/585  Body mass index is 28.37 kg/m². GCS: 15  General: Acute on chronically ill-appearing white female  HEENT:  normocephalic and atraumatic, surgical dressing. No scleral icterus. PERR  Neck: supple. No Thyromegaly. Lungs: clear to auscultation. No retractions  Cardiac: RRR. No JVD. Abdomen: soft. Nontender. Extremities:  No clubbing, cyanosis, or edema x 4. Vasculature: capillary refill < 3 seconds. Palpable dorsalis pedis pulses. Skin:  warm and dry. Psych:  Alert and oriented x3. Affect appropriate  Lymph:  No supraclavicular adenopathy. Neurologic:  No focal deficit. No seizures. Data: (All radiographs, tracings, PFTs, and imaging are personally viewed and interpreted unless otherwise noted). Sodium under 42, potassium 3.4, chloride 106, CO2 28, BUN 17, creatinine 0.5, anion gap 8.0, glucose 107. WBC 6.1, hemoglobin 9.7, hematocrit 31.5, platelet count 92  Telemetry shows normal sinus rhythm  CT head 5/11/2022 reports status post removal of mass of the right posterior temporal regions. Air in the frontal horn. CT head 5/6/2022 reports new right temporal edema. Cystic mass. Chest x-ray 5/7/2022 reports questionable new infiltrate or pneumonitis in the right lung base. MRI 5/6/2022 reports large irregular enhancing cystic mass on the right with surrounding edema. Considerations would be malignancy versus abscess. EKG 5/6/2022 reports normal sinus rhythm  Echocardiogram 8/17/2021 reports normal EF 65%, normal LV function. Meets Continued ICU Level Care Criteria:    [] Yes   [x] No - Transfer Planned to listed location: 4A   [x] HOSPITALIST CONTACTED-awaiting bed placement    Case and plan discussed with Dr. Delisa Guerrero and Dr. Ursula Barthel         Electronically signed by Arabella Sutherland. PAMELA Almaraz - CNP  CRITICAL CARE SPECIALIST  Patient seen by me including key components of medical care. Case discussed with resident physician. Case discussed with Dr. Abril Bourne. Transfer to medical floor. Italicized font, if present,  represents changes to the note made by me. CC time 25 minutes. Time was discontiguous. Time does not include procedure. Time does include my direct assessment of the patient and coordination of care. Time represents more than 50% of the time involved with patient care by the [address] [place] [address] team.  Electronically signed by Bruno Mas.  Cynthia Chavez MD.

## 2022-05-13 NOTE — OP NOTE
[address] [address]  RECORD OF OPERATION    Patient name: Sandra Nagy Record Number: [id number]  Account Number: [de-identified]      Date of Procedure: 5/13/2022    Pre-operative Diagnosis:     · Large multilocular cystic enhancing lesion/tumor in the right parieto-occipital lobes. 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 · Brain Compression. Post-operative Diagnosis: The same     PROCEDURE:     ·       Re-exploration of previous right-sided posterior craniotomy. · Surgical resection of right  recurrent cerebral  metastatic tumor involving the right  parieto-occipital regions  · Utilizing micro surgical technique and the surgical microscope. · Using the intraoperative neuro-navigation. · Duraplasty by utilizing piece of dural substitute (dura flex). ·  Application of Mejia head 3-pins head fixation system. Anesthesia: General endotracheal     Surgeons/Assistants: Glendy Miller MD      Estimated Blood Loss: 100 ml     Drains: 1 TREASURE drain (under the skin). Blood Transfusions: None      Complications: none immediately appreciated     Specimens: From large intra-axial tumor in the  right parieto-occipital regions. Indications for Procedure:        ( Please see my consultation and progress notes for this patient for more information). This is an 72-year-old female who underwent brain MRI recently that showed large large brain mass assoiciated with significant brain edema.  The findings are more consistent with recurrent metastatic brain tumor.   Patient has a recent history of progressive has a progressive headache that associated with nausea and vomiting over the last 2 weeks.             Decision making:    -Based on patient patient current symptoms, the findings of her neurological exam and the findings of her brain imaging studies, after a thorough discussion with other treatment teams members (ICU team and radiation oncology team) and with keeping in mind patient's age group, 2 main treatment options are proposed to the patient:  · To proceed with only palliative care treatment option at this time. · To proceed with more aggressive treatment option in the form of revision of patient's previous right-sided craniotomy for maximum safe resection of patient right-sided current brain metastatic tumor.    -The above to treatment options were discussed with patient and her family in detail multiple time in front of the ICU team Rocio Bosch NP). Associated risk and benefits with all the above treatment options were discussed in detail as well as the realistic expectation.  - I discussed with the patient and her family the possible complication of surgery including excessive blood loss requiring transfusion, infection that may become meningitis, problem with heart, lung and kidney as a result of general anesthesia such as heart attack, pneumonia, kidney shutdown and stroke. We also discussed the possible complication of the operations in and around the brain such as, death paralysis problems with vision problems with speech, seizures, weakness on one side or the other of the body postoperative meningitis postoperative development of a blood clot that may require another operation, leakage of fluid from the wound that may require another operation, deafness, problems with swallowing and with the ability to close the eye (that may be temporary or permanent). -Patient and her family understood that there is no guarantee to achieve a total resection of the tumor. I told the patient that based of final results of the pathology he  may need another treatment modalities. -I emphasized for the patient and her family that here is no guarantee to achieve a total resection of the tumor (based on the tumor location and anatomical configuration).   -All questions and concerns addressed and answered.  -Patient elected to proceed with surgical treatment option \" revision of previous right-sided craniotomy for maximum safe resection of right-sided brain tumor\", and the surgical consent was signed. PROCEDURE:      Patient was brought to the OR where she was placed  supine on the OR table. General anesthesia was inducted. Then, IV lines, A-line  and Koenig catheter were inserted and maintained. Next, we did registration between patient and the neuronavigation system and   as we were satisfied with the accuracy of neuronavigation system, we  we fixed patient head in [place] head fixation system with turning the head to left side. After that we placed a large shoulder role under patient's right shoulder and placed the patient in semilateral position. After secured the patient's with tapes and belts. We  secured  patient's head in [place] head fixation system. We shaved the patient's head . Next,  We  localized the location of the tumor based of neuro navigation feedback. The location of the tumor was in  the right parieto-occipital area almost directly underneath the bone bone flap of the previous craniotomy. Next, we proceeded in draping and prepping the patient in standard fashion. Then, I proceeded with marking the skin incision. The skin incision which was a  horseshoe skin incision in same location of the skin incision of previous craniotomy skin incision. I dissected the soft tissue under the skin, scar tissue  and the muscle all the way down to reach the bone. We used the neuronavigation to localize The location of the tumor again. I exposed  The previous bone flap. I noticed that the bone flap was well-healed with some area . I removed the old screws and plate . Then , I used a periosteal elevator to elevate the bone flap. After exposing the dura I opened the dura in a circular fashion. I removed the previous pieces of the DuraGen. The tumor was found in the middle of the surgical exposure at the level of the dura.    Then, we brought the microscope in and took several several biopsies and sent them for for frozen biopsy exam.    Next, I used a combination of microsurgical techniques, Sonopet and bipolar to resect the tumor in piecemeal fashion. At that time, the results of the frozen came back as more consistent with metastatic brain tumor. After I reached the point that I thought that I achieved what appeared to be a  and gross total resection of the tumor. I proceeded with performing a meticulous hemostasis. As I was satisfied with the  hemostasis, I applied  FloSeal, fibrillar and Surgicel and then I did a copious  irrigation. Then, as the irrigated  Fluid kept returning back as a clear fluid and no obvious bleed, I proceeded  with closing the dura in a water-tight fashion using a piece of dural substitute and applied a layer of TISSEEL on the top of the dura then I applied a layer of DuraGen. Next, we returned back the bone flap and secured the bone flap with screws and plates. Next, We did a meticulous irrigation again. After that we closed the wound in a standard  fashion. The patient tolerated the procedure very well without  intraoperative complications. At the end of the surgery, patient was  flipped back to supine position and she was extubated. Then patient was transferred to the PACU for further observation and  treatment.      *Molly Goodwin PA-C ( Neurosurgery PA) assisted throughout the procedure with positioning, draping, retraction, wound closure, and dressing application      Natasha Gill MD, MD  Electronically signed by me on 5/13/2022 at 9:31 AM

## 2022-05-13 NOTE — PLAN OF CARE
Problem: Discharge Planning  Goal: Discharge to home or other facility with appropriate resources  Description: Discharge planning in progress. Outcome: Progressing  Flowsheets (Taken 5/12/2022 2100)  Discharge to home or other facility with appropriate resources:   Identify barriers to discharge with patient and caregiver   Arrange for needed discharge resources and transportation as appropriate   Identify discharge learning needs (meds, wound care, etc)     Problem: Pain  Goal: Verbalizes/displays adequate comfort level or baseline comfort level  Description: PRN Norco for pain management. Encouraged to report pain on pain scale. Outcome: Progressing  Flowsheets (Taken 5/12/2022 2100)  Verbalizes/displays adequate comfort level or baseline comfort level:   Encourage patient to monitor pain and request assistance   Assess pain using appropriate pain scale   Implement non-pharmacological measures as appropriate and evaluate response     Problem: Safety - Adult  Goal: Free from fall injury  Description: Family at bedside. Bed alarm activated. Outcome: Progressing  Flowsheets (Taken 5/13/2022 0021)  Free From Fall Injury: Instruct family/caregiver on patient safety     Problem: Chronic Conditions and Co-morbidities  Goal: Patient's chronic conditions and co-morbidity symptoms are monitored and maintained or improved  Description: Monitoring co-morbidities. Outcome: Progressing  Flowsheets (Taken 5/12/2022 2100)  Care Plan - Patient's Chronic Conditions and Co-Morbidity Symptoms are Monitored and Maintained or Improved:   Monitor and assess patient's chronic conditions and comorbid symptoms for stability, deterioration, or improvement   Collaborate with multidisciplinary team to address chronic and comorbid conditions and prevent exacerbation or deterioration     Problem: Nutrition Deficit:  Goal: Optimize nutritional status  Description: NPO at this time. Monitoring for nutritional needs.    Outcome: Progressing     Problem: ABCDS Injury Assessment  Goal: Absence of physical injury  Description: No physical injury noted. Outcome: Progressing  Flowsheets (Taken 5/13/2022 0021)  Absence of Physical Injury: Implement safety measures based on patient assessment     Problem: Skin/Tissue Integrity  Goal: Absence of new skin breakdown  Description: 1. Monitor for areas of redness and/or skin breakdown  2. Assess vascular access sites hourly  3. Every 4-6 hours minimum:  Change oxygen saturation probe site  4. Every 4-6 hours:  If on nasal continuous positive airway pressure, respiratory therapy assess nares and determine need for appliance change or resting period.   Outcome: Progressing

## 2022-05-13 NOTE — RT PROTOCOL NOTE
RT Inhaler-Nebulizer Bronchodilator Protocol Note    There is a bronchodilator order in the chart from a provider indicating to follow the RT Bronchodilator Protocol and there is an Initiate RT Inhaler-Nebulizer Bronchodilator Protocol order as well (see protocol at bottom of note). CXR Findings:  No results found. The findings from the last RT Protocol Assessment were as follows:   History Pulmonary Disease: None or smoker <15 pack years  Respiratory Pattern: Regular pattern and RR 12-20 bpm  Breath Sounds: Inspiratory and expiratory or bilateral wheezing and/or rhonchi  Cough: Strong, spontaneous, non-productive  Indication for Bronchodilator Therapy: On home bronchodilators,Decreased or absent breath sounds  Bronchodilator Assessment Score: 6    Aerosolized bronchodilator medication orders have been revised according to the RT Inhaler-Nebulizer Bronchodilator Protocol below. Respiratory Therapist to perform RT Therapy Protocol Assessment initially then follow the protocol. Repeat RT Therapy Protocol Assessment PRN for score 0-3 or on second treatment, BID, and PRN for scores above 3. No Indications - adjust the frequency to every 6 hours PRN wheezing or bronchospasm, if no treatments needed after 48 hours then discontinue using Per Protocol order mode. If indication present, adjust the RT bronchodilator orders based on the Bronchodilator Assessment Score as indicated below. Use Inhaler orders unless patient has one or more of the following: on home nebulizer, not able to hold breath for 10 seconds, is not alert and oriented, cannot activate and use MDI correctly, or respiratory rate 25 breaths per minute or more, then use the equivalent nebulizer order(s) with same Frequency and PRN reasons based on the score. If a patient is on this medication at home then do not decrease Frequency below that used at home.     0-3 - enter or revise RT bronchodilator order(s) to equivalent RT Bronchodilator order with Frequency of every 4 hours PRN for wheezing or increased work of breathing using Per Protocol order mode. 4-6 - enter or revise RT Bronchodilator order(s) to two equivalent RT bronchodilator orders with one order with BID Frequency and one order with Frequency of every 4 hours PRN wheezing or increased work of breathing using Per Protocol order mode. 7-10 - enter or revise RT Bronchodilator order(s) to two equivalent RT bronchodilator orders with one order with TID Frequency and one order with Frequency of every 4 hours PRN wheezing or increased work of breathing using Per Protocol order mode. 11-13 - enter or revise RT Bronchodilator order(s) to one equivalent RT bronchodilator order with QID Frequency and an Albuterol order with Frequency of every 4 hours PRN wheezing or increased work of breathing using Per Protocol order mode. Greater than 13 - enter or revise RT Bronchodilator order(s) to one equivalent RT bronchodilator order with every 4 hours Frequency and an Albuterol order with Frequency of every 2 hours PRN wheezing or increased work of breathing using Per Protocol order mode. RT to enter RT Home Evaluation for COPD & MDI Assessment order using Per Protocol order mode.     Electronically signed by Dennise Arndt RCP on 5/13/2022 at 8:21 AM

## 2022-05-13 NOTE — PROGRESS NOTES
Comprehensive Nutrition Assessment    Type and Reason for Visit:  Initial,RD Nutrition Re-Screen/LOS    Nutrition Recommendations/Plan:   1. Continue General Diet  2. Begin Magic Cup ONS TID     Malnutrition Assessment:  Malnutrition Status: At risk for malnutrition (Comment) (05/13/22 4086)    Context:  Chronic Illness     Findings of the 6 clinical characteristics of malnutrition:  Energy Intake:  No significant decrease in energy intake  Weight Loss:  No significant weight loss     Body Fat Loss:  No significant body fat loss     Muscle Mass Loss:  No significant muscle mass loss    Fluid Accumulation:  Mild     Strength:  Not Performed    Nutrition Assessment:      Pt. nutritionally compromised AEB increased needs for post op healing. At risk for further nutrition compromise r/t s/p crani d/t metastatic disease and underlying medical condition limited vision, advanced age, Hx breast CA with mets to liver and brain, colostomy (revised), GERD, Covid,      Nutrition Related Findings:   Pt. Report/Treatments/Miscellaneous: pt. Seen this am; very pleasant; drinks 2 Boost daily pta; does not like [employer] equivalent [employer] but agrees to [employer]; dentures are loose but pt. Reports no need for texture modifications; no swallowing difficulties on regular textures  GI Status: BM x1 5/10  Pertinent Labs: glucose 104  BUN 17  Creatinine 0.5  Pertinent Meds: calcium, ATB, colace, humalog, prednisone, zofran  ·  Wound Type:  5/11 Re-exploration of previous right-sided posterior craniotomy. Surgical resection of right  recurrent cerebral  metastatic tumor involving the right  parieto-occipital regions        Current Nutrition Intake & Therapies:    Average Meal Intake: 51-75%,%     ADULT DIET;  Regular  ADULT ORAL NUTRITION SUPPLEMENT; Breakfast, Lunch, Dinner; Frozen Oral Supplement    Anthropometric Measures:  Height: 5' (152.4 cm)  Ideal Body Weight (IBW): 100 lbs (45 kg)    Admission Body Weight: 141 lb (64 kg) (5/6)  Current Body Weight: 145 lb (65.8 kg) (5/12 with Non pitting edema),   IBW. Current BMI (kg/m2): 28.3  Usual Body Weight: 135 lb (61.2 kg) (per pt; 142# 10/29/21)  % Weight Change (Calculated): 7.4                    BMI Categories: Overweight (BMI 25.0-29. 9)    Estimated Daily Nutrient Needs:  Energy Requirements Based On: Kcal/kg  Weight Used for Energy Requirements: Current (66kgm)  Energy (kcal/day): 2262-1558 (20-25/kgm)  Weight Used for Protein Requirements: Ideal (45kgm)  Protein (g/day): 59(1.3/kgm IBW)     Fluid (ml/day): per physician    Nutrition Diagnosis:   · Increased nutrient needs related to acute injury/trauma as evidenced by  (s/p surgery)      Nutrition Interventions:   Food and/or Nutrient Delivery: Continue Current Diet,Start Oral Nutrition Supplement  Nutrition Education/Counseling: Education initiated (5/13 discussed ONS options for here - pt. drinks Boost BID at home and plans to continue)  Coordination of Nutrition Care: Continue to monitor while inpatient,Interdisciplinary Rounds       Goals:  Previous Goal Met: Progressing toward Goal(s)  Goals: PO intake 75% or greater,by next RD assessment       Nutrition Monitoring and Evaluation:      Food/Nutrient Intake Outcomes: Food and Nutrient Intake,Supplement Intake  Physical Signs/Symptoms Outcomes: Biochemical Data,Chewing or Swallowing,GI Status,Fluid Status or Edema,Hemodynamic Status,Nutrition Focused Physical Findings,Skin,Weight    Discharge Planning:     Too soon to determine     [address], [place], [place]  Contact: [phone]

## 2022-05-13 NOTE — PLAN OF CARE
Problem: Respiratory - Adult  Goal: Clear lung sounds  Description: Clear lung sounds  Outcome: Progressing   Patient continues on breathing treatments; takes prn at home; will continue to monitor patients respiratory status

## 2022-05-14 LAB
ANION GAP SERPL CALCULATED.3IONS-SCNC: 5 MEQ/L (ref 8–16)
BASOPHILS # BLD: 0.2 %
BASOPHILS ABSOLUTE: 0 THOU/MM3 (ref 0–0.1)
BUN BLDV-MCNC: 17 MG/DL (ref 7–22)
CALCIUM SERPL-MCNC: 7.9 MG/DL (ref 8.5–10.5)
CHLORIDE BLD-SCNC: 103 MEQ/L (ref 98–111)
CO2: 32 MEQ/L (ref 23–33)
CREAT SERPL-MCNC: 0.5 MG/DL (ref 0.4–1.2)
EOSINOPHIL # BLD: 2.8 %
EOSINOPHILS ABSOLUTE: 0.2 THOU/MM3 (ref 0–0.4)
ERYTHROCYTE [DISTWIDTH] IN BLOOD BY AUTOMATED COUNT: 14.9 % (ref 11.5–14.5)
ERYTHROCYTE [DISTWIDTH] IN BLOOD BY AUTOMATED COUNT: 50.4 FL (ref 35–45)
GFR SERPL CREATININE-BSD FRML MDRD: > 90 ML/MIN/1.73M2
GLUCOSE BLD-MCNC: 151 MG/DL (ref 70–108)
GLUCOSE BLD-MCNC: 76 MG/DL (ref 70–108)
GLUCOSE BLD-MCNC: 81 MG/DL (ref 70–108)
GLUCOSE BLD-MCNC: 93 MG/DL (ref 70–108)
HCT VFR BLD CALC: 32.4 % (ref 37–47)
HEMOGLOBIN: 10.1 GM/DL (ref 12–16)
IMMATURE GRANS (ABS): 0.05 THOU/MM3 (ref 0–0.07)
IMMATURE GRANULOCYTES: 0.8 %
LYMPHOCYTES # BLD: 10.3 %
LYMPHOCYTES ABSOLUTE: 0.6 THOU/MM3 (ref 1–4.8)
MCH RBC QN AUTO: 29 PG (ref 26–33)
MCHC RBC AUTO-ENTMCNC: 31.2 GM/DL (ref 32.2–35.5)
MCV RBC AUTO: 93.1 FL (ref 81–99)
MONOCYTES # BLD: 9.3 %
MONOCYTES ABSOLUTE: 0.6 THOU/MM3 (ref 0.4–1.3)
NUCLEATED RED BLOOD CELLS: 0 /100 WBC
PLATELET # BLD: 97 THOU/MM3 (ref 130–400)
PMV BLD AUTO: 11.9 FL (ref 9.4–12.4)
POTASSIUM SERPL-SCNC: 4.1 MEQ/L (ref 3.5–5.2)
RBC # BLD: 3.48 MILL/MM3 (ref 4.2–5.4)
SEG NEUTROPHILS: 76.6 %
SEGMENTED NEUTROPHILS ABSOLUTE COUNT: 4.7 THOU/MM3 (ref 1.8–7.7)
SODIUM BLD-SCNC: 140 MEQ/L (ref 135–145)
WBC # BLD: 6.1 THOU/MM3 (ref 4.8–10.8)

## 2022-05-14 PROCEDURE — 99024 POSTOP FOLLOW-UP VISIT: CPT | Performed by: NEUROLOGICAL SURGERY

## 2022-05-14 PROCEDURE — 80048 BASIC METABOLIC PNL TOTAL CA: CPT

## 2022-05-14 PROCEDURE — 2700000000 HC OXYGEN THERAPY PER DAY

## 2022-05-14 PROCEDURE — 97530 THERAPEUTIC ACTIVITIES: CPT

## 2022-05-14 PROCEDURE — 97162 PT EVAL MOD COMPLEX 30 MIN: CPT

## 2022-05-14 PROCEDURE — 6370000000 HC RX 637 (ALT 250 FOR IP): Performed by: PHYSICIAN ASSISTANT

## 2022-05-14 PROCEDURE — 99232 SBSQ HOSP IP/OBS MODERATE 35: CPT | Performed by: HOSPITALIST

## 2022-05-14 PROCEDURE — 6360000002 HC RX W HCPCS: Performed by: NURSE PRACTITIONER

## 2022-05-14 PROCEDURE — 2060000000 HC ICU INTERMEDIATE R&B

## 2022-05-14 PROCEDURE — 94760 N-INVAS EAR/PLS OXIMETRY 1: CPT

## 2022-05-14 PROCEDURE — 85025 COMPLETE CBC W/AUTO DIFF WBC: CPT

## 2022-05-14 PROCEDURE — 6370000000 HC RX 637 (ALT 250 FOR IP): Performed by: HOSPITALIST

## 2022-05-14 PROCEDURE — 94640 AIRWAY INHALATION TREATMENT: CPT

## 2022-05-14 PROCEDURE — 6370000000 HC RX 637 (ALT 250 FOR IP): Performed by: NURSE PRACTITIONER

## 2022-05-14 PROCEDURE — 82948 REAGENT STRIP/BLOOD GLUCOSE: CPT

## 2022-05-14 PROCEDURE — 2500000003 HC RX 250 WO HCPCS: Performed by: PHYSICIAN ASSISTANT

## 2022-05-14 PROCEDURE — 2580000003 HC RX 258: Performed by: PHYSICIAN ASSISTANT

## 2022-05-14 PROCEDURE — APPSS60 APP SPLIT SHARED TIME 46-60 MINUTES: Performed by: PHYSICIAN ASSISTANT

## 2022-05-14 PROCEDURE — 6360000002 HC RX W HCPCS: Performed by: PHYSICIAN ASSISTANT

## 2022-05-14 PROCEDURE — 36592 COLLECT BLOOD FROM PICC: CPT

## 2022-05-14 RX ORDER — BUPIVACAINE HYDROCHLORIDE 5 MG/ML
30 INJECTION, SOLUTION PERINEURAL ONCE
Status: COMPLETED | OUTPATIENT
Start: 2022-05-14 | End: 2022-05-14

## 2022-05-14 RX ADMIN — LEVETIRACETAM 500 MG: 500 TABLET, FILM COATED ORAL at 19:54

## 2022-05-14 RX ADMIN — ALBUTEROL SULFATE 2.5 MG: 2.5 SOLUTION RESPIRATORY (INHALATION) at 08:02

## 2022-05-14 RX ADMIN — Medication 2000 MG: at 09:55

## 2022-05-14 RX ADMIN — Medication 2000 MG: at 01:55

## 2022-05-14 RX ADMIN — PREDNISONE 1 MG: 1 TABLET ORAL at 08:24

## 2022-05-14 RX ADMIN — ALPRAZOLAM 0.25 MG: 0.25 TABLET ORAL at 19:54

## 2022-05-14 RX ADMIN — SODIUM CHLORIDE, PRESERVATIVE FREE 10 ML: 5 INJECTION INTRAVENOUS at 08:25

## 2022-05-14 RX ADMIN — METOPROLOL TARTRATE 25 MG: 25 TABLET, FILM COATED ORAL at 08:25

## 2022-05-14 RX ADMIN — ATORVASTATIN CALCIUM 10 MG: 10 TABLET, FILM COATED ORAL at 19:54

## 2022-05-14 RX ADMIN — ALBUTEROL SULFATE 2.5 MG: 2.5 SOLUTION RESPIRATORY (INHALATION) at 15:34

## 2022-05-14 RX ADMIN — ISOSORBIDE MONONITRATE 30 MG: 30 TABLET, EXTENDED RELEASE ORAL at 08:25

## 2022-05-14 RX ADMIN — MICONAZOLE NITRATE: 20 POWDER TOPICAL at 19:56

## 2022-05-14 RX ADMIN — DOCUSATE SODIUM 100 MG: 100 CAPSULE, LIQUID FILLED ORAL at 08:25

## 2022-05-14 RX ADMIN — Medication 1 TABLET: at 08:24

## 2022-05-14 RX ADMIN — BUPIVACAINE HYDROCHLORIDE 150 MG: 5 INJECTION, SOLUTION PERINEURAL at 11:41

## 2022-05-14 RX ADMIN — LEVETIRACETAM 500 MG: 500 TABLET, FILM COATED ORAL at 08:24

## 2022-05-14 RX ADMIN — CETIRIZINE HYDROCHLORIDE 5 MG: 5 TABLET ORAL at 08:24

## 2022-05-14 RX ADMIN — LEVOTHYROXINE SODIUM 100 MCG: 0.1 TABLET ORAL at 06:30

## 2022-05-14 RX ADMIN — SERTRALINE 50 MG: 50 TABLET, FILM COATED ORAL at 08:25

## 2022-05-14 RX ADMIN — SERTRALINE 50 MG: 50 TABLET, FILM COATED ORAL at 19:54

## 2022-05-14 RX ADMIN — SODIUM CHLORIDE, PRESERVATIVE FREE 10 ML: 5 INJECTION INTRAVENOUS at 19:58

## 2022-05-14 RX ADMIN — PANTOPRAZOLE SODIUM 40 MG: 40 TABLET, DELAYED RELEASE ORAL at 06:30

## 2022-05-14 RX ADMIN — DOCUSATE SODIUM 100 MG: 100 CAPSULE, LIQUID FILLED ORAL at 19:54

## 2022-05-14 RX ADMIN — METOPROLOL TARTRATE 25 MG: 25 TABLET, FILM COATED ORAL at 14:34

## 2022-05-14 RX ADMIN — POLYETHYLENE GLYCOL 3350 17 G: 17 POWDER, FOR SOLUTION ORAL at 19:53

## 2022-05-14 RX ADMIN — TRAZODONE HYDROCHLORIDE 50 MG: 50 TABLET ORAL at 19:54

## 2022-05-14 RX ADMIN — Medication 2000 MG: at 17:55

## 2022-05-14 RX ADMIN — MICONAZOLE NITRATE: 20 POWDER TOPICAL at 08:26

## 2022-05-14 RX ADMIN — AMIODARONE HYDROCHLORIDE 200 MG: 200 TABLET ORAL at 08:24

## 2022-05-14 ASSESSMENT — PAIN SCALES - GENERAL: PAINLEVEL_OUTOF10: 0

## 2022-05-14 ASSESSMENT — ENCOUNTER SYMPTOMS
CHEST TIGHTNESS: 0
SHORTNESS OF BREATH: 0
APNEA: 0

## 2022-05-14 NOTE — PROGRESS NOTES
Hospitalist Progress Note    Patient:  Natasha Pagan      Unit/Bed:4A-04/004-A    YOB: 1933    MRN: [id number]       Acct: [de-identified]     PCP: Leigh Fallon MD    Date of Admission: 5/6/2022    Assessment/Plan:    1. Right sided brain mass: Patient is status post resection on 5/11/2022. Radiation oncology met with patient recommended radiation therapy stereotactic versus whole brain. Patient states she will think about this. Discussed with neurosurgery, drain being removed today. Patient is currently on Keppra for seizure prophylaxis. 2. Cerebral edema: Patient was treated 3% saline in the ED. 3. Vision loss: Patient has history of temporal arteritis, patient is on prednisone  4. Hypertension: Currently on Imdur, monitor blood pressure. 5. Acute hypoxemic respiratory failure: Resolved, this is likely postoperative atelectasis. 6. Paroxysmal atrial fibrillation:Patient currently in sinus rhythm. Continue the amiodarone 200 mg daily. No anticoagulation due to recent surgery. 7. Weakness: PT/OT  8. Disposition: Discharge planning      Subjective (past 24 hours):   Patient seen and examined at bedside. States that she is doing well. She reports that she spoke with the radiation oncologist last night but feels overwhelmed and will think about radiation. Drain to be removed today. Patient reports minimal symptoms. No new complaints or acute overnight events.       Medications:  Reviewed    Infusion Medications    sodium chloride      dextrose       Scheduled Medications    amiodarone  200 mg Oral Daily    albuterol  2.5 mg Nebulization BID    metoprolol tartrate  25 mg Oral BID    calcium-cholecalciferol  1 tablet Oral Daily    lidocaine  2 patch Topical Daily    traZODone  50 mg Oral Nightly    cetirizine  5 mg Oral Daily    predniSONE  1 mg Oral Daily    sodium chloride flush  5-40 mL IntraVENous 2 times per day    ceFAZolin  2,000 mg IntraVENous Q8H    insulin lispro  0-6 Units SubCUTAneous TID WC    insulin lispro  0-3 Units SubCUTAneous Nightly    miconazole   Topical BID    levETIRAcetam  500 mg Oral BID    isosorbide mononitrate  30 mg Oral Daily    docusate sodium  100 mg Oral BID    levothyroxine  100 mcg Oral Daily    pantoprazole  40 mg Oral QAM AC    [Held by provider] aspirin  81 mg Oral Daily    sertraline  50 mg Oral BID    atorvastatin  10 mg Oral Nightly    [Held by provider] enoxaparin  40 mg SubCUTAneous Daily     PRN Meds: potassium chloride **OR** potassium alternative oral replacement **OR** potassium chloride, ALPRAZolam, sodium chloride flush, sodium chloride, magnesium sulfate, glucose, dextrose bolus **OR** dextrose bolus, glucagon (rDNA), dextrose, albuterol, fentanNYL, fentanNYL, hydrALAZINE, HYDROcodone 5 mg - acetaminophen **OR** HYDROcodone 5 mg - acetaminophen, phenol, melatonin, polyethylene glycol, ondansetron **OR** ondansetron      Intake/Output Summary (Last 24 hours) at 5/14/2022 1303  Last data filed at 5/14/2022 0815  Gross per 24 hour   Intake 510 ml   Output 925 ml   Net -415 ml       Diet:  ADULT DIET; Regular  ADULT ORAL NUTRITION SUPPLEMENT; Breakfast, Lunch, Dinner; Frozen Oral Supplement    Exam:  /82   Pulse 68   Temp 97 °F (36.1 °C) (Axillary)   Resp 18   Ht 5' (1.524 m)   Wt 144 lb 8 oz (65.5 kg)   LMP  (LMP Unknown) Comment: age 52 ovaries intact  SpO2 100%   BMI 28.22 kg/m²     General appearance: No apparent distress, appears stated age and cooperative. Drain in place surgical dressing over scalp  Respiratory:  Normal respiratory effort. Clear to auscultation, bilaterally without Rales/Wheezes/Rhonchi. Cardiovascular: Regular rate and rhythm with normal S1/S2 without murmurs, rubs or gallops. Abdomen: Soft, non-tender, non-distended with normal bowel sounds.   Extremities: no pedal edema  Skin:  no rashes    Labs:   Recent Labs     05/12/22  0450 05/13/22  0515 05/14/22  0320   WBC 8.9 6.1 6.1 HGB 10.8* 9.7* 10.1*   HCT 34.9* 31.5* 32.4*   * 92* 97*     Recent Labs     05/12/22  0450 05/13/22  0515 05/14/22  0320   * 142 140   K 4.0 3.4* 4.1   * 106 103   CO2 24 28 32   BUN 15 17 17   CREATININE 0.5 0.5 0.5   CALCIUM 7.9* 8.0* 7.9*     No results for input(s): AST, ALT, BILIDIR, BILITOT, ALKPHOS in the last 72 hours. No results for input(s): INR in the last 72 hours. No results for input(s): Donah Keens in the last 72 hours. No results for input(s): PROCAL in the last 72 hours. Microbiology:      Urinalysis:      Lab Results   Component Value Date    NITRU NEGATIVE 05/07/2022    WBCUA 0-2 05/07/2022    BACTERIA NONE SEEN 05/07/2022    RBCUA 0-2 05/07/2022    BLOODU NEGATIVE 05/07/2022    SPECGRAV 1.011 10/29/2021    GLUCOSEU NEGATIVE 05/07/2022       Radiology:  CT HEAD WO CONTRAST    Result Date: 5/10/2022  PROCEDURE: CT HEAD WO CONTRAST CLINICAL INFORMATION: fr surgery planning. COMPARISON: MRI brain dated 5/6/2022. TECHNIQUE: Helical CT of the head with financial marker utilizing Stribe protocol with axial, sagittal and coronal reconstructions. All CT scans at this facility use dose modulation, iterative reconstruction, and/or weight-based dosing when appropriate to reduce radiation dose to as low as reasonably achievable. FINDINGS: Redemonstration of a left parietal craniotomy with underlying resection cavity. There is an irregular complex multilobulated cystic lesion involving the resection cavity, better characterized on prior MRI. Hypodensity surrounding the lesion corresponds to expansile T2/FLAIR signal seen on previous MRI. There is stable mass effect on the right occipital horn. No midline shift is identified. Ventricular size is stable. 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 Orbits are otherwise unremarkable. Paranasal sinuses and mastoid air cells are clear.       Ray[place] CT of the head for surgical planning redemonstrating right parietal craniotomy with complex cystic mass subjacent involving the prior resection cavity with adjacent mild expansile hypodensity, better characterized on previous MRI and causing stable mild mass effect on the occipital horn of the right lateral ventricle. **This report has been created using voice recognition software. It may contain minor errors which are inherent in voice recognition technology. ** Final report electronically signed by Dr. Sabrina Mina MD on 5/10/2022 10:35 AM      DVT prophylaxis: [] Lovenox                                 [x] SCDs                                 [] SQ Heparin                                 [] Encourage ambulation           [] Already on Anticoagulation     Code Status: DNR-CCA    Tele:   [x] yes             [] no    Active Hospital Problems    Diagnosis Date Noted    Brain metastases [facility]) [C79.31]      Priority: Medium    Brain mass [G93.89] 06/28/2021       Electronically signed by Jina Hernandez MD on 5/14/2022 at 1:03 PM

## 2022-05-14 NOTE — RT PROTOCOL NOTE
RT Nebulizer Bronchodilator Protocol Note    There is a bronchodilator order in the chart from a provider indicating to follow the RT Bronchodilator Protocol and there is an Initiate RT Bronchodilator Protocol order as well (see protocol at bottom of note). CXR Findings:  No results found. The findings from the last RT Protocol Assessment were as follows:  Smoking: None or smoker <15 pack years  Respiratory Pattern: Regular pattern and RR 12-20 bpm  Breath Sounds: Intermittent or unilateral wheezes  Cough: Strong, spontaneous, non-productive  Indication for Bronchodilator Therapy: On home bronchodilators  Bronchodilator Assessment Score: 4    Aerosolized bronchodilator medication orders have been revised according to the RT Nebulizer Bronchodilator Protocol below. Respiratory Therapist to perform RT Therapy Protocol Assessment initially then follow the protocol. Repeat RT Therapy Protocol Assessment PRN for score 0-3 or on second treatment, BID, and PRN for scores above 3. No Indications - adjust the frequency to every 6 hours PRN wheezing or bronchospasm, if no treatments needed after 48 hours then discontinue using Per Protocol order mode. If indication present, adjust the RT bronchodilator orders based on the Bronchodilator Assessment Score as indicated below. If a patient is on this medication at home then do not decrease Frequency below that used at home. 0-3 - enter or revise RT bronchodilator order(s) to equivalent RT Bronchodilator order with Frequency of every 4 hours PRN for wheezing or increased work of breathing using Per Protocol order mode. 4-6 - enter or revise RT Bronchodilator order(s) to two equivalent RT bronchodilator orders with one order with BID Frequency and one order with Frequency of every 4 hours PRN wheezing or increased work of breathing using Per Protocol order mode.          7-10 - enter or revise RT Bronchodilator order(s) to two equivalent RT bronchodilator orders with one order with TID Frequency and one order with Frequency of every 4 hours PRN wheezing or increased work of breathing using Per Protocol order mode. 11-13 - enter or revise RT Bronchodilator order(s) to one equivalent RT bronchodilator order with QID Frequency and an Albuterol order with Frequency of every 4 hours PRN wheezing or increased work of breathing using Per Protocol order mode. Greater than 13 - enter or revise RT Bronchodilator order(s) to one equivalent RT bronchodilator order with every 4 hours Frequency and an Albuterol order with Frequency of every 2 hours PRN wheezing or increased work of breathing using Per Protocol order mode. RT to enter RT Home Evaluation for COPD & MDI Assessment order using Per Protocol order mode.     Electronically signed by Derick Mc RCP on 5/14/2022 at 9:55 AM

## 2022-05-14 NOTE — PROGRESS NOTES
Attending Note:     Patient seen and examined in the floor in conjunction with neurosurgery PA. Discussed with patient and her nurse as well. All data and imaging reviewed by myself. I agree with examination assessment and plan as documented below. Juan David Guzman MD    Neurosurgery Progress Note    Patient:  Yaya Tony      Unit/Bed:4A-04/004-A    YOB: 1933    MRN: [id number]     Acct: [de-identified]     Admit date: 5/6/2022    Chief Complaint   Patient presents with    Abdominal Pain       Patient Seen, Chart, Physician notes, Labs, Radiology studies reviewed. Subjective: Patient is seen and evaluated on the floor postoperative day #3 from craniotomy and resection of intercranial recurring lesion performed by Dr. Osei Youngblood, without complication. She is observed sitting up in a chair comfortably enjoying breakfast with pain very well controlled. Past, Family, Social History unchanged from admission. Diet:  ADULT DIET;  Regular  ADULT ORAL NUTRITION SUPPLEMENT; Breakfast, Lunch, Dinner; Frozen Oral Supplement    Medications:  Scheduled Meds:   amiodarone  200 mg Oral Daily    albuterol  2.5 mg Nebulization BID    metoprolol tartrate  25 mg Oral BID    calcium-cholecalciferol  1 tablet Oral Daily    lidocaine  2 patch Topical Daily    traZODone  50 mg Oral Nightly    cetirizine  5 mg Oral Daily    predniSONE  1 mg Oral Daily    sodium chloride flush  5-40 mL IntraVENous 2 times per day    ceFAZolin  2,000 mg IntraVENous Q8H    insulin lispro  0-6 Units SubCUTAneous TID WC    insulin lispro  0-3 Units SubCUTAneous Nightly    miconazole   Topical BID    levETIRAcetam  500 mg Oral BID    isosorbide mononitrate  30 mg Oral Daily    docusate sodium  100 mg Oral BID    levothyroxine  100 mcg Oral Daily    pantoprazole  40 mg Oral QAM AC    [Held by provider] aspirin  81 mg Oral Daily    sertraline  50 mg Oral BID    atorvastatin  10 mg Oral Nightly    [Held by provider] enoxaparin  40 mg SubCUTAneous Daily     Continuous Infusions:   sodium chloride      dextrose       PRN Meds:potassium chloride **OR** potassium alternative oral replacement **OR** potassium chloride, ALPRAZolam, sodium chloride flush, sodium chloride, magnesium sulfate, glucose, dextrose bolus **OR** dextrose bolus, glucagon (rDNA), dextrose, albuterol, fentanNYL, fentanNYL, hydrALAZINE, HYDROcodone 5 mg - acetaminophen **OR** HYDROcodone 5 mg - acetaminophen, phenol, melatonin, polyethylene glycol, ondansetron **OR** ondansetron    Objective: Patient is sitting up in a chair this morning comfortably with pain very well controlled. Dressings were removed revealing a flat dry incision following removal of the surgical drain performed at bedside without complication this morning. Patient tolerated the bedside procedure and is comfortable with some baseline confusion remaining but otherwise stable and intact neurologically to her baseline with no additional significant changes noted overnight. On exam she presents with clear appropriate speech and participated in discussions involving her care appropriately    Vitals: /82   Pulse 68   Temp 97 °F (36.1 °C) (Axillary)   Resp 18   Ht 5' (1.524 m)   Wt 144 lb 8 oz (65.5 kg)   LMP  (LMP Unknown) Comment: age 52 ovaries intact  SpO2 100%   BMI 28.22 kg/m²     Physical Exam   She remained stable and intact to her baseline on exam this morning with no additional significant changes noted overnight aside from some nocturnal confusion    Physical Exam:  Alert and attentive. Language appropriate, with no aphasia. Pupils equal.  Facial strength symmetric. Review of Systems   Constitutional: Negative for activity change. Respiratory: Negative for apnea, chest tightness and shortness of breath. Cardiovascular: Negative for chest pain. Neurological: Negative for speech difficulty and headaches.    Psychiatric/Behavioral: Positive for confusion. Negative for agitation, behavioral problems and decreased concentration. 24 hour intake/output:    Intake/Output Summary (Last 24 hours) at 5/14/2022 1155  Last data filed at 5/14/2022 0815  Gross per 24 hour   Intake 510 ml   Output 1175 ml   Net -665 ml     Last 3 weights: Wt Readings from Last 3 Encounters:   05/14/22 144 lb 8 oz (65.5 kg)   05/06/22 134 lb (60.8 kg)   01/22/22 134 lb (60.8 kg)         CBC:   Recent Labs     05/12/22  0450 05/13/22  0515 05/14/22  0320   WBC 8.9 6.1 6.1   HGB 10.8* 9.7* 10.1*   * 92* 97*     BMP:    Recent Labs     05/12/22  0450 05/13/22  0515 05/14/22  0320   * 142 140   K 4.0 3.4* 4.1   * 106 103   CO2 24 28 32   BUN 15 17 17   CREATININE 0.5 0.5 0.5   GLUCOSE 150* 107 93     Calcium:  Recent Labs     05/14/22  0320   CALCIUM 7.9*     Magnesium:  Recent Labs     05/12/22  1045   MG 2.1     Glucose:  Recent Labs     05/13/22  2047 05/14/22  0630 05/14/22  1040   POCGLU 135* 76 81     HgbA1C: No results for input(s): LABA1C in the last 72 hours. Lipids: No results for input(s): CHOL, TRIG, HDL, LDL, LDLCALC in the last 72 hours. Radiology reports as per the Radiologist  Radiology: CT HEAD WO CONTRAST    Result Date: 5/10/2022  PROCEDURE: CT HEAD WO CONTRAST CLINICAL INFORMATION: fr surgery planning. COMPARISON: MRI brain dated 5/6/2022. TECHNIQUE: Helical CT of the head with financial marker utilizing [employer] protocol with axial, sagittal and coronal reconstructions. All CT scans at this facility use dose modulation, iterative reconstruction, and/or weight-based dosing when appropriate to reduce radiation dose to as low as reasonably achievable. FINDINGS: Redemonstration of a left parietal craniotomy with underlying resection cavity. There is an irregular complex multilobulated cystic lesion involving the resection cavity, better characterized on prior MRI.  Hypodensity surrounding the lesion corresponds to expansile T2/FLAIR signal seen on previous MRI. There is stable mass effect on the right occipital horn. No midline shift is identified. Ventricular size is stable. 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 Orbits are otherwise unremarkable. Paranasal sinuses and mastoid air cells are clear. Raymond CT of the head for surgical planning redemonstrating right parietal craniotomy with complex cystic mass subjacent involving the prior resection cavity with adjacent mild expansile hypodensity, better characterized on previous MRI and causing stable mild mass effect on the occipital horn of the right lateral ventricle. **This report has been created using voice recognition software. It may contain minor errors which are inherent in voice recognition technology. ** Final report electronically signed by Dr. Devyn Lozoya MD on 5/10/2022 10:35 AM                   Assessment: Postoperative day #3 from reexploration and revision craniotomy for resection of recurrent lesion performed by Dr. Chantell Sherman, without complication    Principal Problem:    Brain mass  Active Problems:    Brain metastases ([address]  Resolved Problems:    * No resolved hospital problems. *        Plan: Patient is seen and evaluated on the floor with evaluation exam findings reviewed and discussed with Dr. John Yates. Patient is transition from the ICU setting to the floor without incident and is resting comfortably with pain very well controlled. Surgical drain was removed at bedside this morning without incident or complication and on exam the patient presents with clear appropriate speech and participated in discussions involving her care appropriately as she remains intact her baseline on exam with no additional significant changes noted overnight other than some nocturnal confusion. Neurosurgery recommends PT and OT with up to a chair as tolerated. Suture removal is indicated at 12 days postoperatively follow-up with neurosurgery as scheduled. Neurosurgery to follow      Electronically signed by Calli Mcdonald PA-C on 5/14/2022 at 11:55 AM    Neurosurgery

## 2022-05-14 NOTE — PROGRESS NOTES
[facility]  INPATIENT PHYSICAL THERAPY  EVALUATION  [facility] 4A - 4A-04/004-A    Time In: 0815  Time Out: 1128  Timed Code Treatment Minutes: 18 Minutes  Minutes: 32          Date: 2022  Patient Name: Elridge Mcardle,  Gender:  female        MRN: [id number]  : 1933  (80 y.o.)      Referring Practitioner: JACK GAYTAN  Diagnosis: brain edema  Additional Pertinent Hx: Pt admitted from neuro office due to nausea, H/A. Hx:  Breast CA with mets to brain and underwent craniotomy with resection and debulking of Rt side anterior brain tumor . Ct+ for edema MRI + for lesion and pt underwent reexpliration and revision for craniotomy with resection of brain lesion 22     Restrictions/Precautions:  Restrictions/Precautions: General Precautions,Fall Risk,Surgical Protocols  Position Activity Restriction  Other position/activity restrictions: Cranial drain    Subjective:  Chart Reviewed: Yes  Patient assessed for rehabilitation services?: Yes  Subjective: Nurse in with pt and approved session. Pt was resting in bed. Pleasant and cooperative. 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     Social/Functional History:    Lives With: Other (comment) (Pt has HHA during the day and her brother stays with her at night.)  Type of Home: [address] [place] [address],[address]: One level  Home Access: Level entry  [address] bed,Walker, 4 wheeled,Walker, rolling,Oxygen     Bathroom Shower/Tub: Tub/Shower unit  Bathroom Toilet: Handicap height  Bathroom Equipment: Grab bars in shower,Shower chair  Bathroom Accessibility: Accessible    Receives Help From: [employer])  ADL Assistance: Needs assistance     Homemaking Responsibilities: No  Ambulation Assistance: Needs assistance  Transfer Assistance: Needs assistance    Active : No  Occupation: Retired       OBJECTIVE:  Range of Motion:  Right Lower Extremity: WFL except df lacking approx 3 degrees to neutral  Left Lower Extremity: WFL except df lacking approx 3 degrees to neutral    Strength:  Right Lower Extremity: WFL except hip flexion 3+, aBD tolerated mod resistance in supine. Left Lower Extremity: WFL    Balance:  Static Sitting Balance:  Stand By Assistance, due to initial c/o dizziness/lightheadedness. Passed quickly. Static Standing Balance: Minimal Assistance, to gain balance with RW support, then CGA with initial c/o dizziness with standing, though passed quickly. Bed Mobility:  Rolling to Right: Contact Guard Assistance, with head of bed raised, with rail, to approx 25 degrees   Supine to Sit: Minimal Assistance, at trunk to elevate. Cues to scoot hips forward once seated to get feet to floor. Transfers:  Sit to Stand: Minimal Assistance, cues for hand placement  Stand to Sit:Minimal Assistance, cues for hand placement    Ambulation:  Minimal Assistance  Distance: 4 ft to recliner  Surface: Level Tile  Device:Rolling Walker  Gait Deviations:  Slow Gillian, Decreased Step Length Bilaterally, Decreased Gait Speed and assist needed with direction and maneuvering RW. Initial min c/o dizziness, but passed quickly. Exercise:     Exercises were completed for increased independence with functional mobility. Seated- toe taps with decreased fluidity noted Carloyn moss with decreased lift height RLE x10 reps ea. Functional Outcome Measures: Not completed  AM-PAC Inpatient Mobility Raw Score : 16  AM-PAC Inpatient T-Scale Score : 40.78    ASSESSMENT:  Activity Tolerance:  Patient tolerance of  treatment: good. Required time for accomodation to the upright position. Treatment Initiated: Treatment and education initiated within context of evaluation.   Evaluation time included review of current medical information, gathering information related to past medical, social and functional history, completion of standardized testing, formal and informal observation of tasks, assessment of data and development of plan of care and goals. Treatment time included skilled education and facilitation of tasks to increase safety and independence with functional mobility for improved independence and quality of life. Assessment: Body Structures, Functions, Activity Limitations Requiring Skilled Therapeutic Intervention: Decreased functional mobility ,Decreased balance,Decreased endurance,Decreased coordination,Decreased strength  Assessment: This 80 y.o female presents with a decline in baseline by way of bed mobility, transfers and gait due to recent craniotomy revision for resection of brain mass. Pt has hx of same (9/21). Pt currently requires min assist for bed mobility and transfers. She tolerated short distance walk to recliner, though had min c/o lightheadedness with initial sitting and standing. Pt did demonstrate decreased RT hip strength and decreased coordination as result. Pt balance also affected due to the vision deficits, decreased strength and endurance. Pt will benefit from skilled PT to advance in these areas for improved functional mobility, gait and safety. Therapy Prognosis: Fair    Requires PT Follow-Up: Yes    Discharge Recommendations:  Discharge Recommendations: Continue to assess pending progress,24 hour supervision or assist,Patient would benefit from continued therapy after discharge    Patient Education:  Education  Education Given To: Patient  Education Provided: Role of Therapy,Plan of [address] [place] [address] Training  Education Method: Demonstration,Verbal  Education Outcome: Verbalized understanding,Continued education needed   .             Equipment Recommendations:  Equipment Needed: No  Other: Pt has 4WW and RW    Plan:  Plan:  (6x/wk N)    Goals:  Patient goals : did not state  Short Term Goals  Time Frame for Short term goals: until discharge  Short term goal 1: Pt to go supine <->sit, SBA, to get in/out of bed. May use rail  Short term goal 2: Pt to get up/down from various seated surfaces, CGA to get up to walk. Short term goal 3: Pt to walk with RW >= 50 ft, CGA for home mobility  Long Term Goals  Time Frame for Long term goals : NA due to expected short LOS    Following session, patient left in safe position with all fall risk precautions in place.

## 2022-05-14 NOTE — PLAN OF CARE
Problem: Respiratory - Adult  Goal: Clear lung sounds  Description: Clear lung sounds  Outcome: Progressing  Note: BID bronchodilator therapy for occasional expiratory wheeze

## 2022-05-14 NOTE — FLOWSHEET NOTE
05/14/22 1405   Vitals   Pulse 135   BP (!) 129/56   MAP (mmHg) 73   BP Location Right upper arm   BP Upper/Lower Upper   BP Method Automatic   Patient Position Semi fowlers   Cardiac Rhythm Atrial fib   Notified Dr. Lindsey Mayfield that patient is in A.fib RVR -130. Ordered for extra dose of metoprolol. See MAR.

## 2022-05-15 LAB
ANION GAP SERPL CALCULATED.3IONS-SCNC: 6 MEQ/L (ref 8–16)
BUN BLDV-MCNC: 22 MG/DL (ref 7–22)
CALCIUM SERPL-MCNC: 8.2 MG/DL (ref 8.5–10.5)
CHLORIDE BLD-SCNC: 100 MEQ/L (ref 98–111)
CO2: 31 MEQ/L (ref 23–33)
CREAT SERPL-MCNC: 0.6 MG/DL (ref 0.4–1.2)
GFR SERPL CREATININE-BSD FRML MDRD: > 90 ML/MIN/1.73M2
GLUCOSE BLD-MCNC: 103 MG/DL (ref 70–108)
GLUCOSE BLD-MCNC: 104 MG/DL (ref 70–108)
GLUCOSE BLD-MCNC: 130 MG/DL (ref 70–108)
GLUCOSE BLD-MCNC: 139 MG/DL (ref 70–108)
GLUCOSE BLD-MCNC: 97 MG/DL (ref 70–108)
POTASSIUM SERPL-SCNC: 4.1 MEQ/L (ref 3.5–5.2)
SODIUM BLD-SCNC: 137 MEQ/L (ref 135–145)

## 2022-05-15 PROCEDURE — 6360000002 HC RX W HCPCS: Performed by: PHYSICIAN ASSISTANT

## 2022-05-15 PROCEDURE — 2580000003 HC RX 258: Performed by: PHYSICIAN ASSISTANT

## 2022-05-15 PROCEDURE — 82948 REAGENT STRIP/BLOOD GLUCOSE: CPT

## 2022-05-15 PROCEDURE — 6370000000 HC RX 637 (ALT 250 FOR IP): Performed by: NURSE PRACTITIONER

## 2022-05-15 PROCEDURE — 99232 SBSQ HOSP IP/OBS MODERATE 35: CPT | Performed by: HOSPITALIST

## 2022-05-15 PROCEDURE — 6370000000 HC RX 637 (ALT 250 FOR IP): Performed by: PHYSICIAN ASSISTANT

## 2022-05-15 PROCEDURE — APPSS60 APP SPLIT SHARED TIME 46-60 MINUTES: Performed by: PHYSICIAN ASSISTANT

## 2022-05-15 PROCEDURE — 2700000000 HC OXYGEN THERAPY PER DAY

## 2022-05-15 PROCEDURE — 80048 BASIC METABOLIC PNL TOTAL CA: CPT

## 2022-05-15 PROCEDURE — 94760 N-INVAS EAR/PLS OXIMETRY 1: CPT

## 2022-05-15 PROCEDURE — 94640 AIRWAY INHALATION TREATMENT: CPT

## 2022-05-15 PROCEDURE — 6360000002 HC RX W HCPCS: Performed by: NURSE PRACTITIONER

## 2022-05-15 PROCEDURE — 2060000000 HC ICU INTERMEDIATE R&B

## 2022-05-15 RX ADMIN — AMIODARONE HYDROCHLORIDE 200 MG: 200 TABLET ORAL at 07:46

## 2022-05-15 RX ADMIN — LEVETIRACETAM 500 MG: 500 TABLET, FILM COATED ORAL at 07:45

## 2022-05-15 RX ADMIN — MICONAZOLE NITRATE: 20 POWDER TOPICAL at 10:06

## 2022-05-15 RX ADMIN — DOCUSATE SODIUM 100 MG: 100 CAPSULE, LIQUID FILLED ORAL at 10:06

## 2022-05-15 RX ADMIN — SERTRALINE 50 MG: 50 TABLET, FILM COATED ORAL at 07:46

## 2022-05-15 RX ADMIN — DOCUSATE SODIUM 100 MG: 100 CAPSULE, LIQUID FILLED ORAL at 20:50

## 2022-05-15 RX ADMIN — PREDNISONE 1 MG: 1 TABLET ORAL at 07:45

## 2022-05-15 RX ADMIN — TRAZODONE HYDROCHLORIDE 50 MG: 50 TABLET ORAL at 20:50

## 2022-05-15 RX ADMIN — SERTRALINE 50 MG: 50 TABLET, FILM COATED ORAL at 20:50

## 2022-05-15 RX ADMIN — SODIUM CHLORIDE, PRESERVATIVE FREE 10 ML: 5 INJECTION INTRAVENOUS at 20:52

## 2022-05-15 RX ADMIN — PANTOPRAZOLE SODIUM 40 MG: 40 TABLET, DELAYED RELEASE ORAL at 07:36

## 2022-05-15 RX ADMIN — MICONAZOLE NITRATE: 20 POWDER TOPICAL at 20:50

## 2022-05-15 RX ADMIN — ALBUTEROL SULFATE 2.5 MG: 2.5 SOLUTION RESPIRATORY (INHALATION) at 18:21

## 2022-05-15 RX ADMIN — HYDROCODONE BITARTRATE AND ACETAMINOPHEN 2 TABLET: 5; 325 TABLET ORAL at 23:43

## 2022-05-15 RX ADMIN — LEVETIRACETAM 500 MG: 500 TABLET, FILM COATED ORAL at 20:50

## 2022-05-15 RX ADMIN — ATORVASTATIN CALCIUM 10 MG: 10 TABLET, FILM COATED ORAL at 20:50

## 2022-05-15 RX ADMIN — METOPROLOL TARTRATE 25 MG: 25 TABLET, FILM COATED ORAL at 07:45

## 2022-05-15 RX ADMIN — CETIRIZINE HYDROCHLORIDE 5 MG: 5 TABLET ORAL at 07:45

## 2022-05-15 RX ADMIN — ALBUTEROL SULFATE 2.5 MG: 2.5 SOLUTION RESPIRATORY (INHALATION) at 09:41

## 2022-05-15 RX ADMIN — Medication 2000 MG: at 03:16

## 2022-05-15 RX ADMIN — ISOSORBIDE MONONITRATE 30 MG: 30 TABLET, EXTENDED RELEASE ORAL at 07:45

## 2022-05-15 RX ADMIN — METOPROLOL TARTRATE 25 MG: 25 TABLET, FILM COATED ORAL at 20:50

## 2022-05-15 RX ADMIN — LEVOTHYROXINE SODIUM 100 MCG: 0.1 TABLET ORAL at 07:36

## 2022-05-15 RX ADMIN — Medication 1 TABLET: at 07:45

## 2022-05-15 RX ADMIN — HYDROCODONE BITARTRATE AND ACETAMINOPHEN 1 TABLET: 5; 325 TABLET ORAL at 07:50

## 2022-05-15 RX ADMIN — Medication 2000 MG: at 10:06

## 2022-05-15 RX ADMIN — SODIUM CHLORIDE, PRESERVATIVE FREE 10 ML: 5 INJECTION INTRAVENOUS at 07:46

## 2022-05-15 ASSESSMENT — PAIN DESCRIPTION - FREQUENCY: FREQUENCY: CONTINUOUS

## 2022-05-15 ASSESSMENT — PAIN - FUNCTIONAL ASSESSMENT: PAIN_FUNCTIONAL_ASSESSMENT: ACTIVITIES ARE NOT PREVENTED

## 2022-05-15 ASSESSMENT — PAIN SCALES - WONG BAKER: WONGBAKER_NUMERICALRESPONSE: 0

## 2022-05-15 ASSESSMENT — PAIN DESCRIPTION - ONSET: ONSET: ON-GOING

## 2022-05-15 ASSESSMENT — PAIN DESCRIPTION - ORIENTATION: ORIENTATION: RIGHT;POSTERIOR

## 2022-05-15 ASSESSMENT — PAIN SCALES - GENERAL
PAINLEVEL_OUTOF10: 8
PAINLEVEL_OUTOF10: 0
PAINLEVEL_OUTOF10: 8

## 2022-05-15 ASSESSMENT — PAIN DESCRIPTION - LOCATION: LOCATION: HEAD

## 2022-05-15 ASSESSMENT — PAIN DESCRIPTION - DESCRIPTORS: DESCRIPTORS: ACHING

## 2022-05-15 NOTE — PROGRESS NOTES
Attending Note:     Patient seen and examined in the floor in conjunction with neurosurgery PA.  Discussed with patient and her nurse as well.  All data and imaging reviewed by myself. I agree with examination assessment and plan as documented below.  Jami Marte MD  Neurosurgery Progress Note    Patient:  Aimee Galaviz      Unit/Bed:4A-04/004-A    YOB: 1933    MRN: [id number]     Acct: [de-identified]     Admit date: 5/6/2022    Chief Complaint   Patient presents with    Abdominal Pain       Patient Seen, Chart, Physician notes, Labs, Radiology studies reviewed. Subjective: Patient is seen and evaluated on the floor with evaluation exam findings reviewed and discussed with Dr. Alix Camara and with nursing. Patient remains postoperative day #4 from craniotomy and resection of intercranial lesion performed Dr. Alix Camara, without complication. Pain was well controlled at the time of the evaluation        Past, Family, Social History unchanged from admission. Diet:  ADULT ORAL NUTRITION SUPPLEMENT; Breakfast, Lunch, Dinner; Frozen Oral Supplement  ADULT DIET; Regular;  No Caffeine    Medications:  Scheduled Meds:   amiodarone  200 mg Oral Daily    albuterol  2.5 mg Nebulization BID    metoprolol tartrate  25 mg Oral BID    calcium-cholecalciferol  1 tablet Oral Daily    lidocaine  2 patch Topical Daily    traZODone  50 mg Oral Nightly    cetirizine  5 mg Oral Daily    predniSONE  1 mg Oral Daily    sodium chloride flush  5-40 mL IntraVENous 2 times per day    insulin lispro  0-6 Units SubCUTAneous TID WC    insulin lispro  0-3 Units SubCUTAneous Nightly    miconazole   Topical BID    levETIRAcetam  500 mg Oral BID    isosorbide mononitrate  30 mg Oral Daily    docusate sodium  100 mg Oral BID    levothyroxine  100 mcg Oral Daily    pantoprazole  40 mg Oral QAM AC    [Held by provider] aspirin  81 mg Oral Daily    sertraline  50 mg Oral BID    atorvastatin 10 mg Oral Nightly    [Held by provider] enoxaparin  40 mg SubCUTAneous Daily     Continuous Infusions:   sodium chloride      dextrose       PRN Meds:potassium chloride **OR** potassium alternative oral replacement **OR** potassium chloride, ALPRAZolam, sodium chloride flush, sodium chloride, magnesium sulfate, glucose, dextrose bolus **OR** dextrose bolus, glucagon (rDNA), dextrose, albuterol, fentanNYL, fentanNYL, hydrALAZINE, HYDROcodone 5 mg - acetaminophen **OR** HYDROcodone 5 mg - acetaminophen, phenol, melatonin, polyethylene glycol, ondansetron **OR** ondansetron    Objective: Patient is observed sitting up in a chair comfortably with pain very well controlled. Incisions are flat and dry including the drain site incision following removal of the surgical drain performed at bedside yesterday without complication. She remains otherwise stable and neurologically intact her baseline on exam with no additional significant changes noted. She presented with clear appropriate speech and participated in discussions involving her care appropriately stating several occasions that she would like to be discharged to home. Vitals: BP (!) 116/42   Pulse 62   Temp 97.7 °F (36.5 °C) (Oral)   Resp 20   Ht 5' (1.524 m)   Wt 144 lb 8 oz (65.5 kg)   LMP  (LMP Unknown) Comment: age 52 ovaries intact  SpO2 94%   BMI 28.22 kg/m²     Physical Exam   Patient remained stable neurologically intact her baseline on exam this morning with no additional significant changes noted overnight  Physical Exam:  Alert and attentive. Language appropriate, with no aphasia. Pupils equal.  Facial strength symmetric. Review of Systems     Constitutional: Negative for activity change. Respiratory: Negative for apnea, chest tightness and shortness of breath. Cardiovascular: Negative for chest pain. Neurological: Negative for speech difficulty and headaches. Psychiatric/Behavioral: Positive for confusion.  Negative for agitation, behavioral problems and decreased concentration. 24 hour intake/output:    Intake/Output Summary (Last 24 hours) at 5/15/2022 1621  Last data filed at 5/15/2022 0750  Gross per 24 hour   Intake 490 ml   Output 0 ml   Net 490 ml     Last 3 weights: Wt Readings from Last 3 Encounters:   05/14/22 144 lb 8 oz (65.5 kg)   05/06/22 134 lb (60.8 kg)   01/22/22 134 lb (60.8 kg)         CBC:   Recent Labs     05/13/22  0515 05/14/22  0320   WBC 6.1 6.1   HGB 9.7* 10.1*   PLT 92* 97*     BMP:    Recent Labs     05/13/22  0515 05/14/22  0320 05/15/22  0328    140 137   K 3.4* 4.1 4.1    103 100   CO2 28 32 31   BUN 17 17 22   CREATININE 0.5 0.5 0.6   GLUCOSE 107 93 104     Calcium:  Recent Labs     05/15/22  0328   CALCIUM 8.2*     Magnesium:No results for input(s): MG in the last 72 hours. Glucose:  Recent Labs     05/15/22  0637 05/15/22  1034 05/15/22  1602   POCGLU 103 97 139*     HgbA1C: No results for input(s): LABA1C in the last 72 hours. Lipids: No results for input(s): CHOL, TRIG, HDL, LDL, LDLCALC in the last 72 hours. Radiology reports as per the Radiologist  Radiology: CT HEAD WO CONTRAST    Result Date: 5/10/2022  PROCEDURE: CT HEAD WO CONTRAST CLINICAL INFORMATION: fr surgery planning. COMPARISON: MRI brain dated 5/6/2022. TECHNIQUE: Helical CT of the head with financial marker utilizing [employer] protocol with axial, sagittal and coronal reconstructions. All CT scans at this facility use dose modulation, iterative reconstruction, and/or weight-based dosing when appropriate to reduce radiation dose to as low as reasonably achievable. FINDINGS: Redemonstration of a left parietal craniotomy with underlying resection cavity. There is an irregular complex multilobulated cystic lesion involving the resection cavity, better characterized on prior MRI. Hypodensity surrounding the lesion corresponds to expansile T2/FLAIR signal seen on previous MRI.  There is stable mass effect on the right occipital horn. No midline shift is identified. Ventricular size is stable. 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 Orbits are otherwise unremarkable. Paranasal sinuses and mastoid air cells are clear. [place] CT of the head for surgical planning redemonstrating right parietal craniotomy with complex cystic mass subjacent involving the prior resection cavity with adjacent mild expansile hypodensity, better characterized on previous MRI and causing stable mild mass effect on the occipital horn of the right lateral ventricle. **This report has been created using voice recognition software. It may contain minor errors which are inherent in voice recognition technology. ** Final report electronically signed by Dr. Kyle Phelps MD on 5/10/2022 10:35 AM                   Assessment: Postoperative day #4 from reexploration and revision craniotomy for resection of recurrent lesion performed by Dr. Serena Pineda, without complication    Principal Problem:    Brain mass  Active Problems:    Brain metastases ([address]  Resolved Problems:    * No resolved hospital problems. *        Plan: Patient is seen and evaluated on the floor with evaluation exam findings reviewed discussed with Dr. Deepika Campos. Patient is status postoperative day #4 from reexploration and revision of craniotomy for safe maximal resection of lesion recurrence performed by Dr. Serena Pineda, without complication. She is observed sitting up in a chair comfortably with flat dry incisions including the drain site incision following removal of the surgical drain performed yesterday at bedside, without complication. She remains otherwise stable and intact her baseline with no additional changes noted.   Surgery recommends discharge planning with a follow-up with neurosurgery corresponding with 12 days postoperative for suture removal.  Neurosurgery to follow      Electronically signed by Alyssa Maradiaga PA-C on 5/15/2022 at 4:21 PM    Neurosurgery

## 2022-05-15 NOTE — RT PROTOCOL NOTE
RT Inhaler-Nebulizer Bronchodilator Protocol Note    There is a bronchodilator order in the chart from a provider indicating to follow the RT Bronchodilator Protocol and there is an Initiate RT Inhaler-Nebulizer Bronchodilator Protocol order as well (see protocol at bottom of note). CXR Findings:  No results found. The findings from the last RT Protocol Assessment were as follows:   History Pulmonary Disease: None or smoker <15 pack years  Respiratory Pattern: Regular pattern and RR 12-20 bpm  Breath Sounds: Intermittent or unilateral wheezes  Cough: Strong, productive  Indication for Bronchodilator Therapy: Decreased or absent breath sounds,On home bronchodilators  Bronchodilator Assessment Score: 5    Aerosolized bronchodilator medication orders have been revised according to the RT Inhaler-Nebulizer Bronchodilator Protocol below. Respiratory Therapist to perform RT Therapy Protocol Assessment initially then follow the protocol. Repeat RT Therapy Protocol Assessment PRN for score 0-3 or on second treatment, BID, and PRN for scores above 3. No Indications - adjust the frequency to every 6 hours PRN wheezing or bronchospasm, if no treatments needed after 48 hours then discontinue using Per Protocol order mode. If indication present, adjust the RT bronchodilator orders based on the Bronchodilator Assessment Score as indicated below. Use Inhaler orders unless patient has one or more of the following: on home nebulizer, not able to hold breath for 10 seconds, is not alert and oriented, cannot activate and use MDI correctly, or respiratory rate 25 breaths per minute or more, then use the equivalent nebulizer order(s) with same Frequency and PRN reasons based on the score. If a patient is on this medication at home then do not decrease Frequency below that used at home.     0-3 - enter or revise RT bronchodilator order(s) to equivalent RT Bronchodilator order with Frequency of every 4 hours PRN for wheezing or increased work of breathing using Per Protocol order mode. 4-6 - enter or revise RT Bronchodilator order(s) to two equivalent RT bronchodilator orders with one order with BID Frequency and one order with Frequency of every 4 hours PRN wheezing or increased work of breathing using Per Protocol order mode. 7-10 - enter or revise RT Bronchodilator order(s) to two equivalent RT bronchodilator orders with one order with TID Frequency and one order with Frequency of every 4 hours PRN wheezing or increased work of breathing using Per Protocol order mode. 11-13 - enter or revise RT Bronchodilator order(s) to one equivalent RT bronchodilator order with QID Frequency and an Albuterol order with Frequency of every 4 hours PRN wheezing or increased work of breathing using Per Protocol order mode. Greater than 13 - enter or revise RT Bronchodilator order(s) to one equivalent RT bronchodilator order with every 4 hours Frequency and an Albuterol order with Frequency of every 2 hours PRN wheezing or increased work of breathing using Per Protocol order mode. RT to enter RT Home Evaluation for COPD & MDI Assessment order using Per Protocol order mode.     Electronically signed by Steven Dinh RCP on 5/15/2022 at 9:53 AM

## 2022-05-15 NOTE — PROGRESS NOTES
Hospitalist Progress Note    Patient:  Eamon Hobbs      Unit/Bed:4A-04/004-A    YOB: 1933    MRN: [id number]       Acct: [de-identified]     PCP: Mark Carballo MD    Date of Admission: 5/6/2022    Assessment/Plan:    Brain mass: Patient had right-sided metastatic brain tumor resection/debulking 7/2021 by Dr. Lars Quinones, neurosurgeon. Patient presented with worsening headaches, nausea and vomiting. Patient found to have cerebral edema. Patient was given hypertonic saline as well as started on Decadron and Keppra for seizure prophylaxis. Per Dr. Yuridia Messer note, radiation oncologist considering whole brain radiation versus stereotactic radiotherapy. This to be further discussed on outpatient basis. Surgical drain removed 5/14/22. POD #4 from craniotomy with resection of intracranial reoccurring lesion.    - Continue keppra and prednisone (continue steroids for 1 week on discharge, neurosurgery recommendations)    - Per Dr. Lars Quinones recommendations will discharge with 1 weeks steroids   -Follow up with neurosurgery NP in clinic in 14 days for suture removal ( 5/25/22)    -Ancef 5/12/22-5/15/22    History of breast cancer with mets: Diagnosed with breast cancer 2016 with mets to the liver. Patient was found to have mets to the brain in which she underwent right-sided metastatic tumor resection/debulking 7/2021 patient was following with Dr. Jerry Gann, and now follows with Dr. Hari Lovell. Paroxysmal atrial fibrillation: Found 5/11/2022. Patient has [id number] Score: 2. And given recent surgery patient not a good candidate for anticoagulation therapy at this time. Patient did have an echo 5/12/2022 that demonstrated EF 65-70% with dilated dilated left atrium and right ventricular systolic pressure 55 mmHg.   Patient had episode of atrial fibrillation with rapid ventricular sponsor 5/14/2022 which resolved with Lopressor.   - Continue PO amiodarone, Imdur, lopressor     Urinary tract infection: Urine cultures 5/7/2022 demonstrated E. coli. Patient continued on Ancef. 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 Hyperlipidemia: Continue Lipitor     Hypothyroidism: Continue Synthroid    Anxiety: Continue Zoloft    Acute hypoxic respiratory failure: Resolved -Patient initially required ICU management immediate postoperatively from tumor resection likely secondary to atelectasis. This subsequently resolved and patient on room air.   -Continue DuoNebs twice daily.   -Pulmonary hygiene    Expected discharge date:  TBD     Disposition:    [] Home       [] TCU       [] Rehab       [] Psych       [] SNF       [] [place]       [] Other-    Chief Complaint: Abdominal pain     Hospital Course:   54-year-old female presented to [facility] emergency room department 5/6/2022 with lower extremity numbness, severe headache as well as nausea and vomiting. Patient has significant past medical history of breast cancer 2016 that metastasized to liver and brain 7/2021 (s/p craniotomy for tumor resection/debulking), temporal arteritis resulting in right eye blindness, left carotid artery stenosis, hyperlipidemia, GERD, hypothyroidism as well as vitamin D deficiency.     Patient expressed that for the previous 2 weeks she has had progressive worsening headaches that resulted in difficulty sleeping as well as associated nausea vomiting within the last few days. Patient also had fatigue at this time. While patient was worked up in the emergency department labs were relatively unremarkable however CT head did demonstrate new cerebral edema more significant in the temporal occipital and parietal lobes of the right side. There is also noted to be an underlying cystic mass in which Dr. Natasha Junior, neurosurgery was consulted. Patient was admitted to the ICU 5/6/2022 for close monitoring.   Upon arrival to the ICU patient was started on Decadron as well as hypertonic saline. An MRI was obtained.     Patient underwent right-sided craniotomy 5/11/2022 by Dr. Abril Bourne, neurosurgeon. Subsequently that evening patient did develop atrial fibrillation with rapid ventricular response in which amiodarone bolus and drip were initiated and Lopressor 5 mg IV was given. And at that point patient did convert to normal sinus rhythm.     Given resolution of atrial fibrillation and patient being stable patient was okay to be transition to ICU stepdown 5/13/2021.     5/14/22 patient had episode of atrial fibrillation with rapid ventricular response that responded well to Lopressor    Subjective (past 24 hours):   Head pressure without true headache but no significant change from prior   Patient states she is eating well and had a good breakfast. Patient states she slept funny and that is why her neck hurts     Medications:  Reviewed    Infusion Medications    sodium chloride      dextrose       Scheduled Medications    amiodarone  200 mg Oral Daily    albuterol  2.5 mg Nebulization BID    metoprolol tartrate  25 mg Oral BID    calcium-cholecalciferol  1 tablet Oral Daily    lidocaine  2 patch Topical Daily    traZODone  50 mg Oral Nightly    cetirizine  5 mg Oral Daily    predniSONE  1 mg Oral Daily    sodium chloride flush  5-40 mL IntraVENous 2 times per day    ceFAZolin  2,000 mg IntraVENous Q8H    insulin lispro  0-6 Units SubCUTAneous TID WC    insulin lispro  0-3 Units SubCUTAneous Nightly    miconazole   Topical BID    levETIRAcetam  500 mg Oral BID    isosorbide mononitrate  30 mg Oral Daily    docusate sodium  100 mg Oral BID    levothyroxine  100 mcg Oral Daily    pantoprazole  40 mg Oral QAM AC    [Held by provider] aspirin  81 mg Oral Daily    sertraline  50 mg Oral BID    atorvastatin  10 mg Oral Nightly    [Held by provider] enoxaparin  40 mg SubCUTAneous Daily     PRN Meds: potassium chloride **OR** potassium alternative oral replacement **OR** potassium chloride, ALPRAZolam, sodium chloride flush, sodium chloride, magnesium sulfate, glucose, dextrose bolus **OR** dextrose bolus, glucagon (rDNA), dextrose, albuterol, fentanNYL, fentanNYL, hydrALAZINE, HYDROcodone 5 mg - acetaminophen **OR** HYDROcodone 5 mg - acetaminophen, phenol, melatonin, polyethylene glycol, ondansetron **OR** ondansetron      Intake/Output Summary (Last 24 hours) at 5/15/2022 5844  Last data filed at 5/15/2022 0002  Gross per 24 hour   Intake 570 ml   Output 870 ml   Net -300 ml       Diet:  ADULT DIET; Regular  ADULT ORAL NUTRITION SUPPLEMENT; Breakfast, Lunch, Dinner; Frozen Oral Supplement    Exam:  BP (!) 143/54   Pulse 69   Temp 98 °F (36.7 °C) (Oral)   Resp 18   Ht 5' (1.524 m)   Wt 144 lb 8 oz (65.5 kg)   LMP  (LMP Unknown) Comment: age 52 ovaries intact  SpO2 96%   BMI 28.22 kg/m²     General appearance - oriented to person, place, and time, left IJ present   Chest - clear to auscultation, no wheezes, rales or rhonchi, symmetric air entry  Heart - normal rate and regular rhythm, S1 and S2 normal  Abdomen - soft, nontender, nondistended, left mid hernia noted   Obese: No; Protuberant: No   Neurological - alert, oriented, normal speech, no focal findings or movement disorder noted. Left sided sutures clean without exudate   Extremities - peripheral pulses normal, no pedal edema, no clubbing or cyanosis  Skin - warm and dry       Labs:   Recent Labs     05/13/22  0515 05/14/22  0320   WBC 6.1 6.1   HGB 9.7* 10.1*   HCT 31.5* 32.4*   PLT 92* 97*     Recent Labs     05/13/22  0515 05/14/22  0320 05/15/22  0328    140 137   K 3.4* 4.1 4.1    103 100   CO2 28 32 31   BUN 17 17 22   CREATININE 0.5 0.5 0.6   CALCIUM 8.0* 7.9* 8.2*     No results for input(s): AST, ALT, BILIDIR, BILITOT, ALKPHOS in the last 72 hours. No results for input(s): INR in the last 72 hours. No results for input(s): Al Altes in the last 72 hours.     Microbiology: Urinalysis:      Lab Results   Component Value Date    NITRU NEGATIVE 05/07/2022    WBCUA 0-2 05/07/2022    BACTERIA NONE SEEN 05/07/2022    RBCUA 0-2 05/07/2022    BLOODU NEGATIVE 05/07/2022    SPECGRAV 1.011 10/29/2021    GLUCOSEU NEGATIVE 05/07/2022       Radiology:  MRI BRAIN W WO CONTRAST   Final Result       1. Postoperative changes involving the right parietal and posterior temporal calvarium. 2. Thickening at the surgical site and in the right lateral ventricle. 3. 3.7 x 1.3 cm area of abnormal signal intensity in the right posterior temporal lobe consistent with postoperative changes and hemorrhage at the surgical site. 4. There is edema in the right temporal and parietal regions. 4. There is mild atrophy and dilatation of the lateral ventricles. 5.. Probable ischemic changes in the white matter. 6. There is a small extra-axial lesion over the left posterior frontal lobe possibly representing an incidental meningioma. .               **This report has been created using voice recognition software. It may contain minor errors which are inherent in voice recognition technology. **         Final report electronically signed by DR Ced Ugarte on 5/13/2022 8:16 AM      CT HEAD WO CONTRAST   Final Result       1. Status post removal of the mass in the right posterior temporal region with air at the surgical site. 2. Air in the frontal horn of the right lateral ventricle. 3. Patient edema in the right temporal and parietal lobes. 4. Probable ischemic changes in the white matter. 5. Postoperative changes involving the right globe. .               **This report has been created using voice recognition software. It may contain minor errors which are inherent in voice recognition technology. **      Final report electronically signed by DR Ced Ugarte on 5/11/2022 4:52 PM      XR CHEST PORTABLE   Final Result   Mild bibasilar opacities compatible with atelectasis and/or infiltrates.       This document has been electronically signed by: Sánchez Castillo MD on    05/11/2022 02:28 AM      CT HEAD WO CONTRAST   Final Result    [place] CT of the head for surgical planning redemonstrating right parietal craniotomy with complex cystic mass subjacent involving the prior resection cavity with adjacent mild expansile hypodensity, better characterized on previous MRI and causing    stable mild mass effect on the occipital horn of the right lateral ventricle. **This report has been created using voice recognition software. It may contain minor errors which are inherent in voice recognition technology. **      Final report electronically signed by Dr. Terri Naranjo MD on 5/10/2022 10:35 AM      XR CHEST PORTABLE   Final Result   1. Unremarkable central line placement. 2. Question of new infiltrate or pneumonitis in right lung base. This document has been electronically signed by: Marge Murguia MD    on 05/07/2022 06:10 AM      XR CHEST PORTABLE   Final Result   Impression:      No acute processes. This document has been electronically signed by: Umair Sales MD on    05/07/2022 04:25 AM      MRI BRAIN W WO CONTRAST   Final Result   Impression:      Large irregular enhancing cystic mass on the right with surrounding edema. Primary considerations would be recurrent malignancy versus abscess      Persistent linear enhancement along the meninges anterior left parietal    lobe. This document has been electronically signed by: Umair Sales MD on    05/06/2022 11:54 PM      CT Head WO Contrast   Final Result   Impression:      New edema right temporal, occipital and parietal lobes. There appear to be underlying cystic masses, 1 of which measures 2 cm. These are not well assessed, recommend follow-up contrast-enhanced MRI.       This document has been electronically signed by: Umair Sales MD on    05/06/2022 09:06 PM      All CTs at this facility use dose modulation techniques and iterative    reconstructions, and/or weight-based dosing   when appropriate to reduce radiation to a low as reasonably achievable. DVT prophylaxis: [] Lovenox                                 [x] SCDs                                 [] SQ Heparin                                 [] Encourage ambulation           [] Already on Anticoagulation     Code Status: DNR-CCA    PT/OT Eval Status:  Following    Tele:   [x] yes             [] no    Active Hospital Problems    Diagnosis Date Noted    Brain metastases [facility]) [C79.31]      Priority: Medium    Brain mass [G93.89] 06/28/2021       Electronically signed by Gretchen Tobias DO on 5/15/2022 at 6:24 AM

## 2022-05-15 NOTE — PLAN OF CARE
Problem: Respiratory - Adult  Goal: Clear lung sounds  Description: Clear lung sounds  Outcome: Progressing   Continue aerosol tx's to improve aeration of lungs. Pt has tx's at home PRN.

## 2022-05-15 NOTE — PROGRESS NOTES
Notified Dr. Arlette Barthel of increasing fluid build up near incision site between skin and skull. Dr. Arlette Barthel will see patient tomorrow.

## 2022-05-16 ENCOUNTER — APPOINTMENT (OUTPATIENT)
Dept: CT IMAGING | Age: 87
DRG: 025 | End: 2022-05-16
Payer: MEDICARE

## 2022-05-16 LAB
ANION GAP SERPL CALCULATED.3IONS-SCNC: 6 MEQ/L (ref 8–16)
BASOPHILS # BLD: 0 %
BASOPHILS ABSOLUTE: 0 THOU/MM3 (ref 0–0.1)
BUN BLDV-MCNC: 15 MG/DL (ref 7–22)
CALCIUM SERPL-MCNC: 6.1 MG/DL (ref 8.5–10.5)
CHLORIDE BLD-SCNC: 111 MEQ/L (ref 98–111)
CO2: 23 MEQ/L (ref 23–33)
CREAT SERPL-MCNC: 0.3 MG/DL (ref 0.4–1.2)
EOSINOPHIL # BLD: 1.8 %
EOSINOPHILS ABSOLUTE: 0.1 THOU/MM3 (ref 0–0.4)
ERYTHROCYTE [DISTWIDTH] IN BLOOD BY AUTOMATED COUNT: 14.6 % (ref 11.5–14.5)
ERYTHROCYTE [DISTWIDTH] IN BLOOD BY AUTOMATED COUNT: 49.7 FL (ref 35–45)
GFR SERPL CREATININE-BSD FRML MDRD: > 90 ML/MIN/1.73M2
GLUCOSE BLD-MCNC: 149 MG/DL (ref 70–108)
GLUCOSE BLD-MCNC: 154 MG/DL (ref 70–108)
GLUCOSE BLD-MCNC: 77 MG/DL (ref 70–108)
GLUCOSE BLD-MCNC: 78 MG/DL (ref 70–108)
GLUCOSE BLD-MCNC: 92 MG/DL (ref 70–108)
GLUCOSE BLD-MCNC: 93 MG/DL (ref 70–108)
HCT VFR BLD CALC: 28.1 % (ref 37–47)
HCT VFR BLD CALC: 33 % (ref 37–47)
HEMOGLOBIN: 10.5 GM/DL (ref 12–16)
HEMOGLOBIN: 8.6 GM/DL (ref 12–16)
IMMATURE GRANS (ABS): 0.05 THOU/MM3 (ref 0–0.07)
IMMATURE GRANULOCYTES: 0.8 %
LYMPHOCYTES # BLD: 19 %
LYMPHOCYTES ABSOLUTE: 1.2 THOU/MM3 (ref 1–4.8)
MCH RBC QN AUTO: 28.7 PG (ref 26–33)
MCHC RBC AUTO-ENTMCNC: 30.6 GM/DL (ref 32.2–35.5)
MCV RBC AUTO: 93.7 FL (ref 81–99)
MONOCYTES # BLD: 9.6 %
MONOCYTES ABSOLUTE: 0.6 THOU/MM3 (ref 0.4–1.3)
NUCLEATED RED BLOOD CELLS: 0 /100 WBC
PLATELET # BLD: 74 THOU/MM3 (ref 130–400)
PMV BLD AUTO: 12.3 FL (ref 9.4–12.4)
POTASSIUM SERPL-SCNC: 3 MEQ/L (ref 3.5–5.2)
RBC # BLD: 3 MILL/MM3 (ref 4.2–5.4)
SEG NEUTROPHILS: 68.8 %
SEGMENTED NEUTROPHILS ABSOLUTE COUNT: 4.2 THOU/MM3 (ref 1.8–7.7)
SODIUM BLD-SCNC: 140 MEQ/L (ref 135–145)
WBC # BLD: 6.1 THOU/MM3 (ref 4.8–10.8)

## 2022-05-16 PROCEDURE — 6370000000 HC RX 637 (ALT 250 FOR IP): Performed by: NURSE PRACTITIONER

## 2022-05-16 PROCEDURE — 97116 GAIT TRAINING THERAPY: CPT

## 2022-05-16 PROCEDURE — 99232 SBSQ HOSP IP/OBS MODERATE 35: CPT | Performed by: HOSPITALIST

## 2022-05-16 PROCEDURE — 6360000002 HC RX W HCPCS: Performed by: NURSE PRACTITIONER

## 2022-05-16 PROCEDURE — 70450 CT HEAD/BRAIN W/O DYE: CPT

## 2022-05-16 PROCEDURE — 2060000000 HC ICU INTERMEDIATE R&B

## 2022-05-16 PROCEDURE — 6370000000 HC RX 637 (ALT 250 FOR IP): Performed by: PHYSICIAN ASSISTANT

## 2022-05-16 PROCEDURE — 85025 COMPLETE CBC W/AUTO DIFF WBC: CPT

## 2022-05-16 PROCEDURE — 85018 HEMOGLOBIN: CPT

## 2022-05-16 PROCEDURE — 94760 N-INVAS EAR/PLS OXIMETRY 1: CPT

## 2022-05-16 PROCEDURE — 82948 REAGENT STRIP/BLOOD GLUCOSE: CPT

## 2022-05-16 PROCEDURE — 99024 POSTOP FOLLOW-UP VISIT: CPT | Performed by: NEUROLOGICAL SURGERY

## 2022-05-16 PROCEDURE — 85014 HEMATOCRIT: CPT

## 2022-05-16 PROCEDURE — 2580000003 HC RX 258: Performed by: PHYSICIAN ASSISTANT

## 2022-05-16 PROCEDURE — 97530 THERAPEUTIC ACTIVITIES: CPT

## 2022-05-16 PROCEDURE — APPSS60 APP SPLIT SHARED TIME 46-60 MINUTES: Performed by: PHYSICIAN ASSISTANT

## 2022-05-16 PROCEDURE — 80048 BASIC METABOLIC PNL TOTAL CA: CPT

## 2022-05-16 PROCEDURE — 94640 AIRWAY INHALATION TREATMENT: CPT

## 2022-05-16 PROCEDURE — 2700000000 HC OXYGEN THERAPY PER DAY

## 2022-05-16 RX ORDER — ALBUTEROL SULFATE 2.5 MG/3ML
2.5 SOLUTION RESPIRATORY (INHALATION) EVERY 4 HOURS PRN
Status: DISCONTINUED | OUTPATIENT
Start: 2022-05-16 | End: 2022-05-19 | Stop reason: HOSPADM

## 2022-05-16 RX ORDER — BUPIVACAINE HYDROCHLORIDE 5 MG/ML
30 INJECTION, SOLUTION EPIDURAL; INTRACAUDAL ONCE
Status: DISCONTINUED | OUTPATIENT
Start: 2022-05-16 | End: 2022-05-19 | Stop reason: HOSPADM

## 2022-05-16 RX ADMIN — LEVETIRACETAM 500 MG: 500 TABLET, FILM COATED ORAL at 08:58

## 2022-05-16 RX ADMIN — ALBUTEROL SULFATE 2.5 MG: 2.5 SOLUTION RESPIRATORY (INHALATION) at 05:00

## 2022-05-16 RX ADMIN — DOCUSATE SODIUM 100 MG: 100 CAPSULE, LIQUID FILLED ORAL at 08:57

## 2022-05-16 RX ADMIN — LEVOTHYROXINE SODIUM 100 MCG: 0.1 TABLET ORAL at 05:10

## 2022-05-16 RX ADMIN — MICONAZOLE NITRATE: 20 POWDER TOPICAL at 19:51

## 2022-05-16 RX ADMIN — SERTRALINE 50 MG: 50 TABLET, FILM COATED ORAL at 08:58

## 2022-05-16 RX ADMIN — PREDNISONE 1 MG: 1 TABLET ORAL at 08:57

## 2022-05-16 RX ADMIN — INSULIN LISPRO 1 UNITS: 100 INJECTION, SOLUTION INTRAVENOUS; SUBCUTANEOUS at 19:53

## 2022-05-16 RX ADMIN — ATORVASTATIN CALCIUM 10 MG: 10 TABLET, FILM COATED ORAL at 19:52

## 2022-05-16 RX ADMIN — Medication 1 TABLET: at 08:58

## 2022-05-16 RX ADMIN — SERTRALINE 50 MG: 50 TABLET, FILM COATED ORAL at 19:51

## 2022-05-16 RX ADMIN — HYDROCODONE BITARTRATE AND ACETAMINOPHEN 2 TABLET: 5; 325 TABLET ORAL at 18:41

## 2022-05-16 RX ADMIN — HYDROCODONE BITARTRATE AND ACETAMINOPHEN 1 TABLET: 5; 325 TABLET ORAL at 08:58

## 2022-05-16 RX ADMIN — PANTOPRAZOLE SODIUM 40 MG: 40 TABLET, DELAYED RELEASE ORAL at 05:10

## 2022-05-16 RX ADMIN — SODIUM CHLORIDE, PRESERVATIVE FREE 10 ML: 5 INJECTION INTRAVENOUS at 19:51

## 2022-05-16 RX ADMIN — SODIUM CHLORIDE, PRESERVATIVE FREE 10 ML: 5 INJECTION INTRAVENOUS at 10:31

## 2022-05-16 RX ADMIN — TRAZODONE HYDROCHLORIDE 50 MG: 50 TABLET ORAL at 19:52

## 2022-05-16 RX ADMIN — ALBUTEROL SULFATE 2.5 MG: 2.5 SOLUTION RESPIRATORY (INHALATION) at 08:14

## 2022-05-16 RX ADMIN — POTASSIUM CHLORIDE 10 MEQ: 7.46 INJECTION, SOLUTION INTRAVENOUS at 09:55

## 2022-05-16 RX ADMIN — POTASSIUM CHLORIDE 10 MEQ: 7.46 INJECTION, SOLUTION INTRAVENOUS at 08:35

## 2022-05-16 RX ADMIN — POTASSIUM CHLORIDE 10 MEQ: 7.46 INJECTION, SOLUTION INTRAVENOUS at 06:48

## 2022-05-16 RX ADMIN — POTASSIUM CHLORIDE 10 MEQ: 7.46 INJECTION, SOLUTION INTRAVENOUS at 05:58

## 2022-05-16 RX ADMIN — AMIODARONE HYDROCHLORIDE 200 MG: 200 TABLET ORAL at 08:57

## 2022-05-16 RX ADMIN — MICONAZOLE NITRATE: 20 POWDER TOPICAL at 08:59

## 2022-05-16 RX ADMIN — POTASSIUM CHLORIDE 10 MEQ: 7.46 INJECTION, SOLUTION INTRAVENOUS at 10:57

## 2022-05-16 RX ADMIN — ALBUTEROL SULFATE 2.5 MG: 2.5 SOLUTION RESPIRATORY (INHALATION) at 18:26

## 2022-05-16 RX ADMIN — DOCUSATE SODIUM 100 MG: 100 CAPSULE, LIQUID FILLED ORAL at 19:52

## 2022-05-16 RX ADMIN — LEVETIRACETAM 500 MG: 500 TABLET, FILM COATED ORAL at 19:51

## 2022-05-16 RX ADMIN — POTASSIUM CHLORIDE 10 MEQ: 7.46 INJECTION, SOLUTION INTRAVENOUS at 05:08

## 2022-05-16 RX ADMIN — CETIRIZINE HYDROCHLORIDE 5 MG: 5 TABLET ORAL at 08:57

## 2022-05-16 ASSESSMENT — PAIN SCALES - GENERAL
PAINLEVEL_OUTOF10: 6
PAINLEVEL_OUTOF10: 9
PAINLEVEL_OUTOF10: 10
PAINLEVEL_OUTOF10: 9
PAINLEVEL_OUTOF10: 6

## 2022-05-16 ASSESSMENT — ENCOUNTER SYMPTOMS
SHORTNESS OF BREATH: 0
APNEA: 0
CHEST TIGHTNESS: 0

## 2022-05-16 ASSESSMENT — PAIN DESCRIPTION - ONSET: ONSET: ON-GOING

## 2022-05-16 ASSESSMENT — PAIN - FUNCTIONAL ASSESSMENT: PAIN_FUNCTIONAL_ASSESSMENT: ACTIVITIES ARE NOT PREVENTED

## 2022-05-16 ASSESSMENT — PAIN DESCRIPTION - ORIENTATION: ORIENTATION: RIGHT;POSTERIOR

## 2022-05-16 ASSESSMENT — PAIN DESCRIPTION - FREQUENCY: FREQUENCY: CONTINUOUS

## 2022-05-16 ASSESSMENT — PAIN DESCRIPTION - DESCRIPTORS
DESCRIPTORS: BURNING
DESCRIPTORS: BURNING

## 2022-05-16 ASSESSMENT — PAIN DESCRIPTION - LOCATION: LOCATION: HEAD

## 2022-05-16 NOTE — PLAN OF CARE
Problem: Respiratory - Adult  Goal: Clear lung sounds  Description: Clear lung sounds  5/16/2022 0820 by Nav Carranza RCP  Outcome: Progressing   Patient on txs. to improve lung sounds

## 2022-05-16 NOTE — PROGRESS NOTES
[facility], [place]  INPATIENT PHYSICAL THERAPY  DAILY NOTE  [facility] 4A - 4A-04/004-A    Time In: 1107  Time Out: 1146  Timed Code Treatment Minutes: 44 Minutes  Minutes: 39          Date: 2022  Patient Name: Sherry Miles,  Gender:  female        MRN: [id number]  : 1933  (80 y.o.)     Referring Practitioner: JACK GAYTAN  Diagnosis: brain edema  Additional Pertinent Hx: Pt admitted from neuro office due to nausea, H/A. Hx:  Breast CA with mets to brain and underwent craniotomy with resection and debulking of Rt side anterior brain tumor . Ct+ for edema MRI + for lesion and pt underwent reexpliration and revision for craniotomy with resection of brain lesion 22     Prior Level of Function:  Lives With: Other (comment) (Pt has HHA during the day and her brother stays with her at night.)  Type of Home: House  Home Layout: One level  Home Access: Level entry  [address] bed,Walker, 4 wheeled,Walker, rolling,Oxygen   Bathroom Shower/Tub: Tub/Shower unit  Bathroom Toilet: Handicap height  Bathroom Equipment: Grab bars in shower,Shower chair  Bathroom Accessibility: Accessible    Receives Help From: [employer])  ADL Assistance: Needs assistance  Homemaking Responsibilities: No  Ambulation Assistance: Needs assistance  Transfer Assistance: Needs assistance  Active : No    Restrictions/Precautions:  Restrictions/Precautions: General Precautions,Fall Risk,Surgical Protocols  Position Activity Restriction  Other position/activity restrictions: Cranial drain     SUBJECTIVE: RN approved session. Patient in bed upon arrival and agreeable to therapy. Pt stated she had to use the bathroom and then would like to sit up in the chair. PAIN: Pt stated her pain has not been too bad but did not rate.     Vitals: Vitals not assessed per clinical judgement, see nursing flowsheet    OBJECTIVE:  Bed Mobility:  Supine to Sit: Contact Guard Assistance  Scooting: Contact Guard Assistance    Transfers:  Sit to Stand: [employer], with increased time for completion, cues for hand placement, with verbal cues, x3 trials; x1 EOB, x2 toilet  Stand to [facility], with increased time for completion, cues for hand placement, with verbal cues    Ambulation:  Contact Guard Assistance, Minimal Assistance  Distance: 2 x 8 ft  Surface: Level Tile  Device:Rolling Walker  Gait Deviations: Forward Flexed Posture, Slow Gillian, Decreased Step Length Bilaterally, Decreased Weight Shift Bilaterally, Decreased Heel Strike Bilaterally, Mild Path Deviations and Decreased Terminal Knee Extension  *Pt needed Min A for walker navigation     Balance:  Static Sitting Balance:  Contact Guard Assistance, while sitting on the toilet  Dynamic Sitting Balance: Contact Guard Assistance, reaching out of QUIRINO for toileting items  Dynamic Standing Balance: Contact Guard Assistance, Minimal Assistance, with verbal cues    -Washing hands and reaching out of QUIRINO   **pt had difficulty grasping objects due to deficiencies in vision     Exercise:  Not performed during this session    Functional Outcome Measures: Completed  AM-PAC Inpatient Mobility Raw Score : 17  AM-PAC Inpatient T-Scale Score : 42.13    ASSESSMENT:  Assessment: Patient progressing toward established goals. Activity Tolerance:  Patient tolerance of  treatment: good. Equipment Recommendations:Equipment Needed: No  Other: Pt has 4WW and RW  Discharge Recommendations: Continue to assess pending progress and Patient would benefit from continued PT at discharge  Plan: Plan:  (6x/wk N)    Patient Education  Patient Education: Plan of Care, Bed Mobility, Transfers, Gait, Up in Chair for All Meals    Goals:  Patient goals : did not state  Short Term Goals  Time Frame for Short term goals: until discharge  Short term goal 1: Pt to go supine <->sit, SBA, to get in/out of bed.   May use rail  Short term goal 2: Pt to get up/down from various seated surfaces, CGA to get up to walk. Short term goal 3: Pt to walk with RW >= 50 ft, CGA for home mobility  Long Term Goals  Time Frame for Long term goals : NA due to expected short LOS    Following session, patient left in safe position with all fall risk precautions in place.

## 2022-05-16 NOTE — CARE COORDINATION
5/16/22, 2:54 PM EDT    DISCHARGE ON GOING EVALUATION    [facility] day: 10  Location: 4A-04/004-A Reason for admit: Brain edema ([facility] [address] [G93.6]  Brain metastases ([facility] [address] [C79.31]  Brain mass [G93.89]  Nausea and vomiting, intractability of vomiting not specified, unspecified vomiting type [R11.2]   Procedure:    5/6 CT Head: New edema right temporal, occipital and parietal lobes. There appear to be underlying cystic masses, 1 of which measures 2 cm. 5/6 MRI Brain: Large irregular enhancing cystic mass on the right with surrounding edema. Primary considerations would be recurrent malignancy versus abscess. Persistent linear enhancement along the meninges anterior left parietal   lobe. 5/7 CXR: Unremarkable central line placement. Question of new infiltrate or pneumonitis in right lung base  5/7 CVC placement  5/11 ·       Re-exploration of previous right-sided posterior craniotomy. · Surgical resection of right  recurrent cerebral  metastatic tumor involving the right  parieto-occipital regions  · Utilizing micro surgical technique and the surgical microscope. · Using the intraoperative neuro-navigation. · Duraplasty by utilizing piece of dural substitute (dura flex). ·  Application of Mejia head 3-pins head fixation system.        Barriers to Discharge: PT/OT/ST, Neurosurgery, Cardiology following. Neuro checks, Radiation Oncology consult done. Oncology. Albuterol nebs, Lipitor, Keprra, diabetes management, pain and nausea control, Prednisone, potassium replacement protocols. PCP: Priyanka Mehta MD  Readmission Risk Score: 22.7 ( )%  Patient Goals/Plan/Treatment Preferences: From home with [facility] and [facility]. SW following for poss ECF for rehab.

## 2022-05-16 NOTE — PROGRESS NOTES
Hospitalist Progress Note    Patient:  Sridhar Gauthier      Unit/Bed:4A-04/004-A    YOB: 1933    MRN: [id number]       Acct: [de-identified]     PCP: Kyler Schultz MD    Date of Admission: 5/6/2022    Assessment/Plan:    Brain mass: Patient had right-sided metastatic brain tumor resection/debulking 7/2021 by Dr. Jeanie Mcdonald, neurosurgeon. Patient presented with worsening headaches, nausea and vomiting. Patient found to have cerebral edema. Patient was given hypertonic saline as well as started on Decadron and Keppra for seizure prophylaxis. Per Dr. Jorge Patel note, radiation oncologist considering whole brain radiation versus stereotactic radiotherapy. This to be further discussed on outpatient basis. Patient completed course of Ancef from 5/12/22 to 5/15/22 . This to be further discussed on outpatient basis. Surgical drain removed 5/14/22. Per Dr. Jeanie Mcdonald recommendations will discharge with 1 weeks steroids. Follow up with neurosurgery NP in clinic in 14 days for suture removal (5/25/22)              - Fluid/Edema right side of head under incisions, neurosurgery to evaluate today. This was not present yesterday morning however slowly developed over the day 5/15/22, normal NIH. Stat CT head w/o contrast ordered              History of breast cancer with mets: Diagnosed with breast cancer 2016 with mets to the liver. Patient was found to have mets to the brain in which she underwent right-sided metastatic tumor resection/debulking 7/2021 patient was following with Dr. Jamarcus Robledo, and now follows with Dr. Neal Barcenas. Per oncology, Dr. Neal Barcenas recommendation she can follow up as an outpatient for further discussion about systemic therapy and outpatient PET to evaluate for disease outside the brain. Paroxysmal atrial fibrillation: Found 5/11/2022. Patient has [id number]-[id number] Score: 2. And given recent surgery patient not a good candidate for anticoagulation therapy at this time.   Patient did have an echo 5/12/2022 that demonstrated EF 65-70% with dilated dilated left atrium and right ventricular systolic pressure 55 mmHg. Patient had episode of atrial fibrillation with rapid ventricular sponsor 5/14/2022 which resolved with Lopressor.    -Continue medications including p.o. amiodarone, Imdur and Lopressor.       Acute Normocytic Anemia:  On arrival, Hg stable on arrival. Current Hg 8.6, yesterday 10.1. Possible due to worsening post op bleed, stat head CT ordered. -Repeat H&H pending, will trend     Urinary tract infection: Patient found to have urinary tract infection with urine culture taken 5/7/2022 grew E. coli. Patient has been on Ancef since 5/11/2022. 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 Hyperlipidemia: Continue Lipitor      Hypothyroidism: Continue Synthroid     Anxiety: Continue Zoloft     Acute hypoxic respiratory failure: Resolved -Patient initially required ICU management immediate postoperatively from tumor resection likely secondary to atelectasis. This subsequently resolved and patient on room air.        Expected discharge date:  5/17/22- 5/18/22     Disposition:    [] Home       [] TCU       [] Rehab       [] Psych       [] SNF       [] [place]       [] Other-    Chief Complaint: Abdominal Pain     Hospital Course:   49-year-old female presented to [facility] emergency room department 5/6/2022 with lower extremity numbness, severe headache as well as nausea and vomiting. Patient has significant past medical history of breast cancer 2016 that metastasized to liver and brain 7/2021 (s/p craniotomy for tumor resection/debulking), temporal arteritis resulting in right eye blindness, left carotid artery stenosis, hyperlipidemia, GERD, hypothyroidism as well as vitamin D deficiency.   Patient expressed that for the previous 2 weeks she has had progressive worsening headaches that resulted in difficulty sleeping as well as associated nausea vomiting within the last few days. Patient also had fatigue at this time. While patient was worked up in the emergency department labs were relatively unremarkable however CT head did demonstrate new cerebral edema more significant in the temporal occipital and parietal lobes of the right side. There is also noted to be an underlying cystic mass in which Dr. Diann Weber, neurosurgery was consulted. Patient was admitted to the ICU 5/6/2022 for close monitoring. Upon arrival to the ICU patient was started on Decadron as well as hypertonic saline. An MRI was obtained. Patient underwent right-sided craniotomy 5/11/2022 by Dr. Diann Weber, neurosurgeon. Subsequently that evening patient did develop atrial fibrillation with rapid ventricular response in which amiodarone bolus and drip were initiated and Lopressor 5 mg IV was given. And at that point patient did convert to normal sinus rhythm. Given resolution of atrial fibrillation and patient being stable patient was okay to be transition to ICU stepdown 5/13/2021. 5/14/22 patient had episode of atrial fibrillation with rapid ventricular response that responded well to Lopressor  Patient had no further episodes of A. fib with rapid ventricular response. Patient was ambulated by nurse 5/15/2022 however due to unsteady gait we will have physical therapy work with patient 5/15/2022 to ensure safe for discharge.      Subjective (past 24 hours):   Patient doing well, however has developed edema/ swelling on right posterior occipital region underlying sutures   No acute complaints - Denies headache     Medications:  Reviewed    Infusion Medications    sodium chloride      dextrose       Scheduled Medications    amiodarone  200 mg Oral Daily    albuterol  2.5 mg Nebulization BID    metoprolol tartrate  25 mg Oral BID    calcium-cholecalciferol  1 tablet Oral Daily    lidocaine  2 patch Topical Daily    traZODone  50 mg Oral Nightly    cetirizine  5 mg Oral Daily    predniSONE  1 mg Oral Daily    sodium chloride flush  5-40 mL IntraVENous 2 times per day    insulin lispro  0-6 Units SubCUTAneous TID WC    insulin lispro  0-3 Units SubCUTAneous Nightly    miconazole   Topical BID    levETIRAcetam  500 mg Oral BID    isosorbide mononitrate  30 mg Oral Daily    docusate sodium  100 mg Oral BID    levothyroxine  100 mcg Oral Daily    pantoprazole  40 mg Oral QAM AC    [Held by provider] aspirin  81 mg Oral Daily    sertraline  50 mg Oral BID    atorvastatin  10 mg Oral Nightly    [Held by provider] enoxaparin  40 mg SubCUTAneous Daily     PRN Meds: potassium chloride **OR** potassium alternative oral replacement **OR** potassium chloride, ALPRAZolam, sodium chloride flush, sodium chloride, magnesium sulfate, glucose, dextrose bolus **OR** dextrose bolus, glucagon (rDNA), dextrose, albuterol, fentanNYL, fentanNYL, hydrALAZINE, HYDROcodone 5 mg - acetaminophen **OR** HYDROcodone 5 mg - acetaminophen, phenol, melatonin, polyethylene glycol, ondansetron **OR** ondansetron      Intake/Output Summary (Last 24 hours) at 5/16/2022 0740  Last data filed at 5/15/2022 2349  Gross per 24 hour   Intake 490 ml   Output 0 ml   Net 490 ml       Diet:  ADULT ORAL NUTRITION SUPPLEMENT; Breakfast, Lunch, Dinner; Frozen Oral Supplement  ADULT DIET; Regular;  No Caffeine    Exam:  BP (!) 106/49   Pulse 61   Temp 97.7 °F (36.5 °C)   Resp 20   Ht 5' (1.524 m)   Wt 146 lb 11.2 oz (66.5 kg)   LMP  (LMP Unknown) Comment: age 52 ovaries intact  SpO2 95%   BMI 28.65 kg/m²     General appearance - alert, well appearing, and in no distress, Left IJ present   Chest - clear to auscultation, no wheezes, rales or rhonchi, symmetric air entry  Heart - normal rate and regular rhythm, S1 and S2 normal  Abdomen - soft, nontender, nondistended, left abdominal hernia noted   Obese: No; Protuberant: No   Neurological - alert, oriented, normal speech, no focal findings or movement disorder noted  Extremities - peripheral pulses normal, no pedal edema, no clubbing or cyanosis  Skin - Warm and dry       Labs:   Recent Labs     05/14/22  0320 05/16/22  0357   WBC 6.1 6.1   HGB 10.1* 8.6*   HCT 32.4* 28.1*   PLT 97* 74*     Recent Labs     05/14/22  0320 05/15/22  0328 05/16/22  0357    137 140   K 4.1 4.1 3.0*    100 111   CO2 32 31 23   BUN 17 22 15   CREATININE 0.5 0.6 0.3*   CALCIUM 7.9* 8.2* 6.1*     No results for input(s): AST, ALT, BILIDIR, BILITOT, ALKPHOS in the last 72 hours. No results for input(s): INR in the last 72 hours. No results for input(s): Zunilda Lowers in the last 72 hours. Microbiology:      Urinalysis:      Lab Results   Component Value Date    NITRU NEGATIVE 05/07/2022    WBCUA 0-2 05/07/2022    BACTERIA NONE SEEN 05/07/2022    RBCUA 0-2 05/07/2022    BLOODU NEGATIVE 05/07/2022    SPECGRAV 1.011 10/29/2021    GLUCOSEU NEGATIVE 05/07/2022       Radiology:  MRI BRAIN W WO CONTRAST   Final Result       1. Postoperative changes involving the right parietal and posterior temporal calvarium. 2. Thickening at the surgical site and in the right lateral ventricle. 3. 3.7 x 1.3 cm area of abnormal signal intensity in the right posterior temporal lobe consistent with postoperative changes and hemorrhage at the surgical site. 4. There is edema in the right temporal and parietal regions. 4. There is mild atrophy and dilatation of the lateral ventricles. 5.. Probable ischemic changes in the white matter. 6. There is a small extra-axial lesion over the left posterior frontal lobe possibly representing an incidental meningioma. .               **This report has been created using voice recognition software. It may contain minor errors which are inherent in voice recognition technology. **         Final report electronically signed by DR Re Clifford on 5/13/2022 8:16 AM      CT HEAD WO CONTRAST   Final Result 1. Status post removal of the mass in the right posterior temporal region with air at the surgical site. 2. Air in the frontal horn of the right lateral ventricle. 3. Patient edema in the right temporal and parietal lobes. 4. Probable ischemic changes in the white matter. 5. Postoperative changes involving the right globe. .               **This report has been created using voice recognition software. It may contain minor errors which are inherent in voice recognition technology. **      Final report electronically signed by DR Annie Block on 5/11/2022 4:52 PM      XR CHEST PORTABLE   Final Result   Mild bibasilar opacities compatible with atelectasis and/or infiltrates. This document has been electronically signed by: Jayla Florian MD on    05/11/2022 02:28 AM      CT HEAD WO CONTRAST   Final Result    [place] CT of the head for surgical planning redemonstrating right parietal craniotomy with complex cystic mass subjacent involving the prior resection cavity with adjacent mild expansile hypodensity, better characterized on previous MRI and causing    stable mild mass effect on the occipital horn of the right lateral ventricle. **This report has been created using voice recognition software. It may contain minor errors which are inherent in voice recognition technology. **      Final report electronically signed by Dr. Roxane Sauceda MD on 5/10/2022 10:35 AM      XR CHEST PORTABLE   Final Result   1. Unremarkable central line placement. 2. Question of new infiltrate or pneumonitis in right lung base. This document has been electronically signed by: Julio César Arizmendi MD    on 05/07/2022 06:10 AM      XR CHEST PORTABLE   Final Result   Impression:      No acute processes.       This document has been electronically signed by: Patrizia Reis MD on    05/07/2022 04:25 AM      MRI BRAIN W WO CONTRAST   Final Result   Impression:      Large irregular enhancing cystic mass on the right with surrounding edema. Primary considerations would be recurrent malignancy versus abscess      Persistent linear enhancement along the meninges anterior left parietal    lobe. This document has been electronically signed by: Karla Gabriel MD on    05/06/2022 11:54 PM      CT Head WO Contrast   Final Result   Impression:      New edema right temporal, occipital and parietal lobes. There appear to be underlying cystic masses, 1 of which measures 2 cm. These are not well assessed, recommend follow-up contrast-enhanced MRI. This document has been electronically signed by: Karla Gabriel MD on    05/06/2022 09:06 PM      All CTs at this facility use dose modulation techniques and iterative    reconstructions, and/or weight-based dosing   when appropriate to reduce radiation to a low as reasonably achievable.           DVT prophylaxis: [] Lovenox                                 [x] SCDs                                 [] SQ Heparin                                 [] Encourage ambulation           [] Already on Anticoagulation     Code Status: DNR-CCA    PT/OT Eval Status: Consulted     Tele:   [x] yes             [] no    Active Hospital Problems    Diagnosis Date Noted    Brain metastases [facility]) [C79.31]      Priority: Medium    Brain mass [G93.89] 06/28/2021       Electronically signed by Dorian Palacio DO on 5/16/2022 at 7:40 AM

## 2022-05-16 NOTE — PLAN OF CARE
Problem: Discharge Planning  Goal: Discharge to home or other facility with appropriate resources  Description: Discharge planning in progress. Outcome: Progressing  Note: Discharge planning in process and discussed with patient/family. Social work consulted for any additional needs. Care manager aware of discharge needs. Problem: Pain  Goal: Verbalizes/displays adequate comfort level or baseline comfort level  Description: PRN Norco for pain management. Encouraged to report pain on pain scale. Outcome: Progressing  Note: Patient able to use 0-10 pain scale. Denies pain at this time. C/O burning sensation at incision site. Agreeable to take PRN pain medications. Problem: Safety - Adult  Goal: Free from fall injury  Description: Family at bedside. Bed alarm activated. Outcome: Progressing  Note: Patient remained free from falls this shift. Bed is in low position with alarm on and siderails up x2. Education given on use of call light and patient voiced understanding. Call light and beside table within reach. Arm band and falling star in place. Hourly rounds completed. Will continue to monitor. Problem: Skin/Tissue Integrity  Goal: Absence of new skin breakdown  Description: 1. Monitor for areas of redness and/or skin breakdown  2. Assess vascular access sites hourly  3. Every 4-6 hours minimum:  Change oxygen saturation probe site  4. Every 4-6 hours:  If on nasal continuous positive airway pressure, respiratory therapy assess nares and determine need for appliance change or resting period. Outcome: Progressing  Note: No signs of skin breakdown. Skin warm, dry, and intact. Mucous membranes pink and moist. Pt has incision site closed with sutures on head open to air. Assistance with turns/ambulation provided PRN. Will continue to monitor. Care plan reviewed with patient. Patient verbalizes understanding of the plan of care and contributed to goal setting.

## 2022-05-16 NOTE — PROGRESS NOTES
[facility] 60  OCCUPATIONAL THERAPY MISSED TREATMENT NOTE  [facility] 4A  4A-04/004-A      Date: 2022  Patient Name: Rupert Shore        CSN: [id number]   : 1933  (80 y.o.)  Gender: female   Referring Practitioner: Vale Herrera PA-C  Diagnosis: Brain mass         REASON FOR MISSED TREATMENT: Hold Treatment per Nursing; reports patient is getting a head CT due to feeling dizzy this date. Asks to hold OT today. Will check back tomorrow.

## 2022-05-16 NOTE — PLAN OF CARE
Problem: Respiratory - Adult  Goal: Clear lung sounds  Description: Clear lung sounds  5/16/2022 1828 by Junito Gavin RCP  Outcome: Progressing  Note: Dulera BID to improve breath sounds.

## 2022-05-16 NOTE — RT PROTOCOL NOTE
RT Inhaler-Nebulizer Bronchodilator Protocol Note    There is a bronchodilator order in the chart from a provider indicating to follow the RT Bronchodilator Protocol and there is an Initiate RT Inhaler-Nebulizer Bronchodilator Protocol order as well (see protocol at bottom of note). CXR Findings:  No results found. The findings from the last RT Protocol Assessment were as follows:   History Pulmonary Disease: None or smoker <15 pack years  Respiratory Pattern: Regular pattern and RR 12-20 bpm  Breath Sounds: Slightly diminished and/or crackles  Cough: Strong, spontaneous, non-productive  Indication for Bronchodilator Therapy: On home bronchodilators,Decreased or absent breath sounds  Bronchodilator Assessment Score: 2    Aerosolized bronchodilator medication orders have been revised according to the RT Inhaler-Nebulizer Bronchodilator Protocol below. Respiratory Therapist to perform RT Therapy Protocol Assessment initially then follow the protocol. Repeat RT Therapy Protocol Assessment PRN for score 0-3 or on second treatment, BID, and PRN for scores above 3. No Indications - adjust the frequency to every 6 hours PRN wheezing or bronchospasm, if no treatments needed after 48 hours then discontinue using Per Protocol order mode. If indication present, adjust the RT bronchodilator orders based on the Bronchodilator Assessment Score as indicated below. Use Inhaler orders unless patient has one or more of the following: on home nebulizer, not able to hold breath for 10 seconds, is not alert and oriented, cannot activate and use MDI correctly, or respiratory rate 25 breaths per minute or more, then use the equivalent nebulizer order(s) with same Frequency and PRN reasons based on the score. If a patient is on this medication at home then do not decrease Frequency below that used at home.     0-3 - enter or revise RT bronchodilator order(s) to equivalent RT Bronchodilator order with Frequency of every 4 hours PRN for wheezing or increased work of breathing using Per Protocol order mode. 4-6 - enter or revise RT Bronchodilator order(s) to two equivalent RT bronchodilator orders with one order with BID Frequency and one order with Frequency of every 4 hours PRN wheezing or increased work of breathing using Per Protocol order mode. 7-10 - enter or revise RT Bronchodilator order(s) to two equivalent RT bronchodilator orders with one order with TID Frequency and one order with Frequency of every 4 hours PRN wheezing or increased work of breathing using Per Protocol order mode. 11-13 - enter or revise RT Bronchodilator order(s) to one equivalent RT bronchodilator order with QID Frequency and an Albuterol order with Frequency of every 4 hours PRN wheezing or increased work of breathing using Per Protocol order mode. Greater than 13 - enter or revise RT Bronchodilator order(s) to one equivalent RT bronchodilator order with every 4 hours Frequency and an Albuterol order with Frequency of every 2 hours PRN wheezing or increased work of breathing using Per Protocol order mode. RT to enter RT Home Evaluation for COPD & MDI Assessment order using Per Protocol order mode.     Electronically signed by Lashon Holt RCP on 5/16/2022 at 6:33 PM

## 2022-05-16 NOTE — CARE COORDINATION
5/16/22, 7:21 AM EDT    DISCHARGE BARRIERS        Patient transferred to  4. Report given to unit Precious Sicard, regarding discharge plan for this patient.

## 2022-05-16 NOTE — PROGRESS NOTES
[address] [place] [address], at bedside to aspirate fluid from patient's scalp incision. About 100 ml serosanguinous fluid removed. See PA's note for more details.

## 2022-05-16 NOTE — PLAN OF CARE
Problem: Respiratory - Adult  Goal: Clear lung sounds  Description: Clear lung sounds  Outcome: Progressing     Continue breathing tx's to improve aeration of lungs/breath sounds

## 2022-05-16 NOTE — PROGRESS NOTES
Attending Note:     Patient seen and examined in the floor in conjunction with neurosurgery PA/NP.  Discussed with patient and her nurse as well.  All data and imaging reviewed by myself. I agree with examination assessment and plan as documented below.  Maciel Raymond MD    Neurosurgery Progress Note    Patient:  Eamon Hobbs      Unit/Bed:4A-04/004-A    YOB: 1933    MRN: [id number]     Acct: [de-identified]     Admit date: 5/6/2022    Chief Complaint   Patient presents with    Abdominal Pain       Patient Seen, Chart, Physician notes, Labs, Radiology studies reviewed. Subjective: Patient is seen and evaluated postoperative day #5 from craniotomy for resection of recurrent lesion performed by Dr. Wilbert San, without complication. Patient was complaining of tenderness around the incision site that went inspected revealed an underlying fluctuance with a fluid buildup below the incision absent any overt discharge. Past, Family, Social History unchanged from admission. Diet:  ADULT ORAL NUTRITION SUPPLEMENT; Breakfast, Lunch, Dinner; Frozen Oral Supplement  ADULT DIET; Regular;  No Caffeine    Medications:  Scheduled Meds:   bupivacaine  30 mL IntraDERmal Once    amiodarone  200 mg Oral Daily    metoprolol tartrate  25 mg Oral BID    calcium-cholecalciferol  1 tablet Oral Daily    lidocaine  2 patch Topical Daily    traZODone  50 mg Oral Nightly    cetirizine  5 mg Oral Daily    predniSONE  1 mg Oral Daily    sodium chloride flush  5-40 mL IntraVENous 2 times per day    insulin lispro  0-6 Units SubCUTAneous TID WC    insulin lispro  0-3 Units SubCUTAneous Nightly    miconazole   Topical BID    levETIRAcetam  500 mg Oral BID    isosorbide mononitrate  30 mg Oral Daily    docusate sodium  100 mg Oral BID    levothyroxine  100 mcg Oral Daily    pantoprazole  40 mg Oral QAM AC    [Held by provider] aspirin  81 mg Oral Daily    sertraline  50 mg Oral BID    atorvastatin  10 mg Oral Nightly    [Held by provider] enoxaparin  40 mg SubCUTAneous Daily     Continuous Infusions:   sodium chloride      dextrose       PRN Meds:albuterol, potassium chloride **OR** potassium alternative oral replacement **OR** potassium chloride, ALPRAZolam, sodium chloride flush, sodium chloride, magnesium sulfate, glucose, dextrose bolus **OR** dextrose bolus, glucagon (rDNA), dextrose, albuterol, fentanNYL, fentanNYL, hydrALAZINE, HYDROcodone 5 mg - acetaminophen **OR** HYDROcodone 5 mg - acetaminophen, phenol, melatonin, polyethylene glycol, ondansetron **OR** ondansetron    Objective: Patient is observed lying in bed on her left side with pain moderately controlled. And underlying fluctuance is noted secondary to fluid accumulation beneath the incision site. The incision site was prepped with Betadine and a 30 mL syringe with 18-gauge needle was applied through the incision with a total accumulation of approximately 100 mL of mostly serosanguineous fluid withdrawn at bedside. The patient tolerated the procedure and was resting comfortably with a flat dry incision. Vitals: BP (!) 135/45   Pulse 65   Temp 98.2 °F (36.8 °C) (Oral)   Resp 18   Ht 5' (1.524 m)   Wt 146 lb 11.2 oz (66.5 kg)   LMP  (LMP Unknown) Comment: age 52 ovaries intact  SpO2 97%   BMI 28.65 kg/m²     Physical Exam   Patient remains stable and otherwise neurologically intact her baseline on exam without any additional or significant changes noted overnight. Physical Exam:  Alert and attentive. Language appropriate, with no aphasia. Pupils equal.  Facial strength symmetric. Review of Systems   Constitutional: Negative for activity change. Respiratory: Negative for apnea, chest tightness and shortness of breath. Cardiovascular: Negative for chest pain. Musculoskeletal: Positive for neck pain.         Some mild neck pain is noted along with tenderness and pressure sensation secondary to fluid accumulation beneath the incision site   Neurological: Negative for dizziness and speech difficulty. Psychiatric/Behavioral: Positive for confusion. Negative for agitation, behavioral problems and decreased concentration. Mild confusion per baseline        24 hour intake/output:    Intake/Output Summary (Last 24 hours) at 5/16/2022 1928  Last data filed at 5/16/2022 1425  Gross per 24 hour   Intake 590 ml   Output 0 ml   Net 590 ml     Last 3 weights: Wt Readings from Last 3 Encounters:   05/15/22 146 lb 11.2 oz (66.5 kg)   05/06/22 134 lb (60.8 kg)   01/22/22 134 lb (60.8 kg)         CBC:   Recent Labs     05/14/22  0320 05/16/22  0357 05/16/22  1020   WBC 6.1 6.1  --    HGB 10.1* 8.6* 10.5*   PLT 97* 74*  --      BMP:    Recent Labs     05/14/22  0320 05/15/22  0328 05/16/22  0357    137 140   K 4.1 4.1 3.0*    100 111   CO2 32 31 23   BUN 17 22 15   CREATININE 0.5 0.6 0.3*   GLUCOSE 93 104 78     Calcium:  Recent Labs     05/16/22  0357   CALCIUM 6.1*     Magnesium:No results for input(s): MG in the last 72 hours. Glucose:  Recent Labs     05/16/22  0658 05/16/22  1038 05/16/22  1519   POCGLU 77 93 92     HgbA1C: No results for input(s): LABA1C in the last 72 hours. Lipids: No results for input(s): CHOL, TRIG, HDL, LDL, LDLCALC in the last 72 hours. Radiology reports as per the Radiologist  Radiology: CT HEAD WO CONTRAST    Result Date: 5/10/2022  PROCEDURE: CT HEAD WO CONTRAST CLINICAL INFORMATION: fr surgery planning. COMPARISON: MRI brain dated 5/6/2022. TECHNIQUE: Helical CT of the head with financial marker utilizing [employer] protocol with axial, sagittal and coronal reconstructions. All CT scans at this facility use dose modulation, iterative reconstruction, and/or weight-based dosing when appropriate to reduce radiation dose to as low as reasonably achievable. FINDINGS: Redemonstration of a left parietal craniotomy with underlying resection cavity.  There is an irregular complex multilobulated cystic lesion involving the resection cavity, better characterized on prior MRI. Hypodensity surrounding the lesion corresponds to expansile T2/FLAIR signal seen on previous MRI. There is stable mass effect on the right occipital horn. No midline shift is identified. Ventricular size is stable. 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 Orbits are otherwise unremarkable. Paranasal sinuses and mastoid air cells are clear. Labelle CT of the head for surgical planning redemonstrating right parietal craniotomy with complex cystic mass subjacent involving the prior resection cavity with adjacent mild expansile hypodensity, better characterized on previous MRI and causing stable mild mass effect on the occipital horn of the right lateral ventricle. **This report has been created using voice recognition software. It may contain minor errors which are inherent in voice recognition technology. ** Final report electronically signed by Dr. Terri Naranjo MD on 5/10/2022 10:35 AM                   Assessment: Status postoperative day #5 from reexploration revision of craniotomy for resection of recurrent lesion performed by Dr. Payam Valdivia, without complication    Principal Problem:    Brain mass  Active Problems:    Brain metastases ([address]  Resolved Problems:    * No resolved hospital problems. *        Plan: Patient is seen and evaluated on the floor with evaluation exam findings reviewed and discussed with Dr. Payam Valdivia and with nursing. Patient is resting comfortably with pain moderately controlled. Complaints of a pressure sensation and tenderness along the incision site secondary to a fluid accumulation with underlying fluctuance was noted. A procedure performed at bedside involving aspiration of the fluid resulted in approximately 100 mL of mostly serosanguineous fluid removed from the incision site. The incision site remains flat and dry following the procedure.   The patient tolerated the procedure and is much more comfortable with pressure and tenderness resolved. Neurosurgery recommends suture removal at 2 weeks postoperative with a follow-up with our service as an outpatient as scheduled.   Neurosurgery to follow      Electronically signed by Alessia Mcconnell PA-C on 5/16/2022 at 7:28 PM    Neurosurgery

## 2022-05-16 NOTE — PROGRESS NOTES
Notified Dr. Eddie Mackenzie, resident, of patient's /39, HR 71, and patient complains of dizziness and blurred vision while lying in bed. Laid patient's head of bed down to 20 degrees and instructed her to stay in bed for now. Dr Eddie Mackenzie at bedside. She stated to notify neurosurgery. This RN notified EMELYN Ricci. Waiting for response. 1003 Dr. Eddie Mackenzie ordered stat head CT and repeat labs. See orders. 56 Dr. Libertad Tobar at bedside. He wants EMELYN Ricci, to aspirate 10 ml. No further concerns at this time. This RN will monitor her closely for changes. [address], that Dr. Libertad Tobar wants to aspirate near patient's incision.

## 2022-05-17 LAB
ANION GAP SERPL CALCULATED.3IONS-SCNC: 6 MEQ/L (ref 8–16)
BASOPHILS # BLD: 0.1 %
BASOPHILS ABSOLUTE: 0 THOU/MM3 (ref 0–0.1)
BUN BLDV-MCNC: 21 MG/DL (ref 7–22)
CALCIUM SERPL-MCNC: 8.7 MG/DL (ref 8.5–10.5)
CHLORIDE BLD-SCNC: 97 MEQ/L (ref 98–111)
CO2: 31 MEQ/L (ref 23–33)
CREAT SERPL-MCNC: 0.6 MG/DL (ref 0.4–1.2)
EOSINOPHIL # BLD: 1.7 %
EOSINOPHILS ABSOLUTE: 0.1 THOU/MM3 (ref 0–0.4)
ERYTHROCYTE [DISTWIDTH] IN BLOOD BY AUTOMATED COUNT: 14.4 % (ref 11.5–14.5)
ERYTHROCYTE [DISTWIDTH] IN BLOOD BY AUTOMATED COUNT: 50.4 FL (ref 35–45)
GFR SERPL CREATININE-BSD FRML MDRD: > 90 ML/MIN/1.73M2
GLUCOSE BLD-MCNC: 101 MG/DL (ref 70–108)
GLUCOSE BLD-MCNC: 107 MG/DL (ref 70–108)
GLUCOSE BLD-MCNC: 158 MG/DL (ref 70–108)
GLUCOSE BLD-MCNC: 170 MG/DL (ref 70–108)
HCT VFR BLD CALC: 34.1 % (ref 37–47)
HEMOGLOBIN: 10.2 GM/DL (ref 12–16)
IMMATURE GRANS (ABS): 0.05 THOU/MM3 (ref 0–0.07)
IMMATURE GRANULOCYTES: 0.7 %
LYMPHOCYTES # BLD: 19.6 %
LYMPHOCYTES ABSOLUTE: 1.4 THOU/MM3 (ref 1–4.8)
MCH RBC QN AUTO: 28.2 PG (ref 26–33)
MCHC RBC AUTO-ENTMCNC: 29.9 GM/DL (ref 32.2–35.5)
MCV RBC AUTO: 94.2 FL (ref 81–99)
MONOCYTES # BLD: 10.6 %
MONOCYTES ABSOLUTE: 0.8 THOU/MM3 (ref 0.4–1.3)
NUCLEATED RED BLOOD CELLS: 0 /100 WBC
PLATELET # BLD: 102 THOU/MM3 (ref 130–400)
PMV BLD AUTO: 12.2 FL (ref 9.4–12.4)
POTASSIUM SERPL-SCNC: 4.4 MEQ/L (ref 3.5–5.2)
RBC # BLD: 3.62 MILL/MM3 (ref 4.2–5.4)
SEG NEUTROPHILS: 67.3 %
SEGMENTED NEUTROPHILS ABSOLUTE COUNT: 4.8 THOU/MM3 (ref 1.8–7.7)
SODIUM BLD-SCNC: 134 MEQ/L (ref 135–145)
WBC # BLD: 7.2 THOU/MM3 (ref 4.8–10.8)

## 2022-05-17 PROCEDURE — 82948 REAGENT STRIP/BLOOD GLUCOSE: CPT

## 2022-05-17 PROCEDURE — 2060000000 HC ICU INTERMEDIATE R&B

## 2022-05-17 PROCEDURE — 80048 BASIC METABOLIC PNL TOTAL CA: CPT

## 2022-05-17 PROCEDURE — 97530 THERAPEUTIC ACTIVITIES: CPT

## 2022-05-17 PROCEDURE — APPSS60 APP SPLIT SHARED TIME 46-60 MINUTES: Performed by: PHYSICIAN ASSISTANT

## 2022-05-17 PROCEDURE — 85025 COMPLETE CBC W/AUTO DIFF WBC: CPT

## 2022-05-17 PROCEDURE — 99232 SBSQ HOSP IP/OBS MODERATE 35: CPT | Performed by: HOSPITALIST

## 2022-05-17 PROCEDURE — 6370000000 HC RX 637 (ALT 250 FOR IP): Performed by: NURSE PRACTITIONER

## 2022-05-17 PROCEDURE — 97116 GAIT TRAINING THERAPY: CPT

## 2022-05-17 PROCEDURE — 2580000003 HC RX 258: Performed by: PHYSICIAN ASSISTANT

## 2022-05-17 PROCEDURE — 6370000000 HC RX 637 (ALT 250 FOR IP): Performed by: PHYSICIAN ASSISTANT

## 2022-05-17 PROCEDURE — 94640 AIRWAY INHALATION TREATMENT: CPT

## 2022-05-17 PROCEDURE — 2700000000 HC OXYGEN THERAPY PER DAY

## 2022-05-17 PROCEDURE — 94760 N-INVAS EAR/PLS OXIMETRY 1: CPT

## 2022-05-17 PROCEDURE — 99024 POSTOP FOLLOW-UP VISIT: CPT | Performed by: NEUROLOGICAL SURGERY

## 2022-05-17 PROCEDURE — 6360000002 HC RX W HCPCS: Performed by: HOSPITALIST

## 2022-05-17 PROCEDURE — 2580000003 HC RX 258: Performed by: STUDENT IN AN ORGANIZED HEALTH CARE EDUCATION/TRAINING PROGRAM

## 2022-05-17 RX ORDER — 0.9 % SODIUM CHLORIDE 0.9 %
500 INTRAVENOUS SOLUTION INTRAVENOUS ONCE
Status: COMPLETED | OUTPATIENT
Start: 2022-05-17 | End: 2022-05-17

## 2022-05-17 RX ORDER — SODIUM CHLORIDE 9 MG/ML
INJECTION, SOLUTION INTRAVENOUS CONTINUOUS
Status: DISCONTINUED | OUTPATIENT
Start: 2022-05-17 | End: 2022-05-19 | Stop reason: HOSPADM

## 2022-05-17 RX ADMIN — LEVOTHYROXINE SODIUM 100 MCG: 0.1 TABLET ORAL at 05:31

## 2022-05-17 RX ADMIN — SODIUM CHLORIDE, PRESERVATIVE FREE 10 ML: 5 INJECTION INTRAVENOUS at 08:05

## 2022-05-17 RX ADMIN — SODIUM CHLORIDE, PRESERVATIVE FREE 10 ML: 5 INJECTION INTRAVENOUS at 19:31

## 2022-05-17 RX ADMIN — METOPROLOL TARTRATE 25 MG: 25 TABLET, FILM COATED ORAL at 08:05

## 2022-05-17 RX ADMIN — LEVETIRACETAM 500 MG: 500 TABLET, FILM COATED ORAL at 08:05

## 2022-05-17 RX ADMIN — DOCUSATE SODIUM 100 MG: 100 CAPSULE, LIQUID FILLED ORAL at 19:30

## 2022-05-17 RX ADMIN — LEVETIRACETAM 500 MG: 500 TABLET, FILM COATED ORAL at 19:30

## 2022-05-17 RX ADMIN — ALBUTEROL SULFATE 2.5 MG: 2.5 SOLUTION RESPIRATORY (INHALATION) at 22:07

## 2022-05-17 RX ADMIN — SODIUM CHLORIDE: 9 INJECTION, SOLUTION INTRAVENOUS at 14:03

## 2022-05-17 RX ADMIN — DOCUSATE SODIUM 100 MG: 100 CAPSULE, LIQUID FILLED ORAL at 08:05

## 2022-05-17 RX ADMIN — TRAZODONE HYDROCHLORIDE 50 MG: 50 TABLET ORAL at 19:30

## 2022-05-17 RX ADMIN — Medication 1 TABLET: at 08:05

## 2022-05-17 RX ADMIN — MICONAZOLE NITRATE: 20 POWDER TOPICAL at 08:04

## 2022-05-17 RX ADMIN — MICONAZOLE NITRATE: 20 POWDER TOPICAL at 19:30

## 2022-05-17 RX ADMIN — CETIRIZINE HYDROCHLORIDE 5 MG: 5 TABLET ORAL at 08:05

## 2022-05-17 RX ADMIN — ATORVASTATIN CALCIUM 10 MG: 10 TABLET, FILM COATED ORAL at 19:30

## 2022-05-17 RX ADMIN — AMIODARONE HYDROCHLORIDE 200 MG: 200 TABLET ORAL at 08:05

## 2022-05-17 RX ADMIN — PANTOPRAZOLE SODIUM 40 MG: 40 TABLET, DELAYED RELEASE ORAL at 05:31

## 2022-05-17 RX ADMIN — HYDROCODONE BITARTRATE AND ACETAMINOPHEN 2 TABLET: 5; 325 TABLET ORAL at 08:04

## 2022-05-17 RX ADMIN — SERTRALINE 50 MG: 50 TABLET, FILM COATED ORAL at 08:05

## 2022-05-17 RX ADMIN — PREDNISONE 1 MG: 1 TABLET ORAL at 08:05

## 2022-05-17 RX ADMIN — INSULIN LISPRO 1 UNITS: 100 INJECTION, SOLUTION INTRAVENOUS; SUBCUTANEOUS at 19:36

## 2022-05-17 RX ADMIN — ISOSORBIDE MONONITRATE 30 MG: 30 TABLET, EXTENDED RELEASE ORAL at 08:05

## 2022-05-17 RX ADMIN — SODIUM CHLORIDE 500 ML: 9 INJECTION, SOLUTION INTRAVENOUS at 11:12

## 2022-05-17 RX ADMIN — HYDROCODONE BITARTRATE AND ACETAMINOPHEN 2 TABLET: 5; 325 TABLET ORAL at 22:27

## 2022-05-17 RX ADMIN — SERTRALINE 50 MG: 50 TABLET, FILM COATED ORAL at 19:32

## 2022-05-17 ASSESSMENT — PAIN SCALES - GENERAL
PAINLEVEL_OUTOF10: 10
PAINLEVEL_OUTOF10: 7
PAINLEVEL_OUTOF10: 8

## 2022-05-17 NOTE — PROGRESS NOTES
[address]  Occupational Therapy  Daily Note  Time:   Time In: 7386  Time Out: 1030  Timed Code Treatment Minutes: 13 Minutes  Minutes: 13          Date: 2022  Patient Name: Acosta Lai,   Gender: female      Room: [facility]004-A  MRN: [id number]  : 1933  (80 y.o.)  Referring Practitioner: Maximilian Maravilla PA-C  Diagnosis: Brain mass  Additional Pertinent Hx: Per chart review, \"Latanya Rod is an 27-year-old white female who presented to [facility] on 2022 for lower extremity numbness headaches and nausea and vomiting. 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 Per report patient presents that for the last 2 weeks she had had progressive headache she reported 3-4 headaches a week she is not [place] difficulty sleeping she stated that she has had nausea and vomiting that started a few days prior. She endorsed feeling fatigued. She denied any dysuria or urgency but did states she had decreased urine output. In the emergency department labs were normal.  Troponin was negative. EKG was completed which was negative. She underwent CT head which showed cerebral edema with a right temporal occipital parietal mass. Consult to neurosurgery Dr. León Wolfe. He recommended admission to the ICU for close neurological monitoring. Patient was started on Decadron and hypertonic saline. MRI brain was ordered. Patient was admitted to ICU in stable condition.  patient underwent surgical resection on . She did have an episode of atrial fib with RVR overnight. This has since resolved.  patient is awake and following commands.   Patient is stable for transfer to assess. Plan: Times per Week: 5x  Times per Day: Daily  Current Treatment Recommendations: Functional mobility training,Endurance training,Neuromuscular re-education,Equipment evaluation, education, & procurement,Patient/Caregiver education & training,Safety education & training,Self-Care / ADL    Patient Education  Patient Education: Role of OT, Plan of Care, ADL's, Precautions and Education Related to [employer] and Wellness    Goals  Short Term Goals  Time Frame for Short term goals: upon discharge  Short Term Goal 1: OTR to assess sit to stand t/f and all mobility. Short Term Goal 2: Patient will complete [facility]B ADL routine with SBA and AE prn. Short Term Goal 3: Patient will learn compensatory strategies for scanning environment and locating needed item with 0-1 verbal cues to increase ADL success and safety. Following session, patient left in safe position with all fall risk precautions in place.

## 2022-05-17 NOTE — FLOWSHEET NOTE
05/17/22 1034   Vitals   Pulse 52   BP (!) 95/42   MAP (mmHg) (!) 57   Therapy notified this RN that patient had small emesis and felt lightheaded while doing activities. Patient was assisted back to bed. Dr. Lise Travis updated and ordered IV fluid bolus and maintenance fluids. See MAR.

## 2022-05-17 NOTE — CARE COORDINATION
5/17/22, 12:27 PM EDT    DISCHARGE PLANNING EVALUATION      SW attempted several calls to family about Latanya's next steps. Called Vikki Ely first, as she is listed as POA on patient's contact forms. She states that she does not feel comfortable making decisions for the patient anymore, that she did not think she was POA anymore. She asked SW to call patient's brother [facility]. Letha Montenegro, daughter Latrice Ochoa states that he is sleeping right now but SW could talk to her. Bernadette states that she absolutely does not want Latanya to go to a facility and wants her to be at home with [facility]. ALEX then pulled up POA paperwork that was received in 2019. It lists a neighbor, Alvarado Rodriguez as POA, so SW spoke to him at [phone]. He states that [place] would probably be okay with going back to [place] [id number] but he would speak to the rest of the family first.     Irwin Porter then pointed out that this POA paperwork was signed in 2009, and there was a more recent one documented. This listed a niece Larissa Marin as [address] [facility], so SW called her. She states that they would like Latanya to return to home with family, but would give SW's number to patient's other niece, Alcira Ward to call SW.       Received a call from niarianna Ward, they want Latanya to go home with her [facility] for RN, PT and OT services and her brother [facility] and niece Alcira Ward will be staying with her.       1:25PM Update-     [facility]  Marshal Caballero ([phone])[facility] updated her on Latanya. She is agreeable to plan and just asked that I call [facility] when patient is discharged.

## 2022-05-17 NOTE — PROGRESS NOTES
Attending Note:     Patient seen and examined in the floor in conjunction with neurosurgery PA/NP.  Discussed with patient and her nurse as well.  All data and imaging reviewed by myself. I agree with examination assessment and plan as documented below.  Alayne Prader, MD    Neurosurgery Progress Note    Patient:  Matthew Haines      Unit/Bed:4A-04/004-A    YOB: 1933    MRN: [id number]     Acct: [de-identified]     Admit date: 5/6/2022    Chief Complaint   Patient presents with    Abdominal Pain       Patient Seen, Chart, Physician notes, Labs, Radiology studies reviewed. Subjective: Patient is seen and evaluated postoperative day #6 from craniotomy for resection of recurrent lesion performed by Dr. Jonnathan Velásquez, without complication. Patient is resting comfortably today with pain moderately controlled. A return of tenderness secondary to fluid collection beneath the incision site is again noted. .        Past, Family, Social History unchanged from admission. Diet:  ADULT ORAL NUTRITION SUPPLEMENT; Breakfast, Lunch, Dinner; Frozen Oral Supplement  ADULT DIET; Regular;  No Caffeine    Medications:  Scheduled Meds:   bupivacaine  30 mL IntraDERmal Once    amiodarone  200 mg Oral Daily    metoprolol tartrate  25 mg Oral BID    calcium-cholecalciferol  1 tablet Oral Daily    lidocaine  2 patch Topical Daily    traZODone  50 mg Oral Nightly    cetirizine  5 mg Oral Daily    predniSONE  1 mg Oral Daily    sodium chloride flush  5-40 mL IntraVENous 2 times per day    insulin lispro  0-6 Units SubCUTAneous TID WC    insulin lispro  0-3 Units SubCUTAneous Nightly    miconazole   Topical BID    levETIRAcetam  500 mg Oral BID    isosorbide mononitrate  30 mg Oral Daily    docusate sodium  100 mg Oral BID    levothyroxine  100 mcg Oral Daily    pantoprazole  40 mg Oral QAM AC    [Held by provider] aspirin  81 mg Oral Daily    sertraline  50 mg Oral BID    atorvastatin  10 mg Oral Nightly    [Held by provider] enoxaparin  40 mg SubCUTAneous Daily     Continuous Infusions:   sodium chloride      dextrose       PRN Meds:albuterol, potassium chloride **OR** potassium alternative oral replacement **OR** potassium chloride, ALPRAZolam, sodium chloride flush, sodium chloride, magnesium sulfate, glucose, dextrose bolus **OR** dextrose bolus, glucagon (rDNA), dextrose, albuterol, fentanNYL, fentanNYL, hydrALAZINE, HYDROcodone 5 mg - acetaminophen **OR** HYDROcodone 5 mg - acetaminophen, phenol, melatonin, polyethylene glycol, ondansetron **OR** ondansetron    Objective: Patient is observed lying in bed on her left side with noted return of significant fluid collection below the incision site. No overt discharge is noted. Patient tolerated aspiration of the fluid yesterday and again this morning with a tight Kerlix dressing and is tight stockinette applied. She is otherwise stable and intact neurologically to her baseline with no additional significant changes noted overnight. Vitals: BP (!) 142/56   Pulse 79   Temp 97.7 °F (36.5 °C)   Resp 18   Ht 5' (1.524 m)   Wt 147 lb 14.4 oz (67.1 kg)   LMP  (LMP Unknown) Comment: age 52 ovaries intact  SpO2 95%   BMI 28.88 kg/m²     Physical Exam   Remained stable neurologically intact her baseline on exam today with no additional significant changes noted overnight. Physical Exam:  Alert and attentive. Language appropriate, with no aphasia. Pupils equal.  Facial strength symmetric. Review of Systems     Constitutional: Negative for activity change. Respiratory: Negative for apnea, chest tightness and shortness of breath. Cardiovascular: Negative for chest pain. Musculoskeletal: Positive for neck pain. Some mild neck pain is noted along with tenderness and pressure sensation secondary to fluid accumulation beneath the incision site   Neurological: Negative for dizziness and speech difficulty.    Psychiatric/Behavioral: Positive for confusion. Negative for agitation, behavioral problems and decreased concentration. Mild confusion per baseline     24 hour intake/output:    Intake/Output Summary (Last 24 hours) at 5/17/2022 0750  Last data filed at 5/16/2022 2309  Gross per 24 hour   Intake 490 ml   Output --   Net 490 ml     Last 3 weights: Wt Readings from Last 3 Encounters:   05/16/22 147 lb 14.4 oz (67.1 kg)   05/06/22 134 lb (60.8 kg)   01/22/22 134 lb (60.8 kg)         CBC:   Recent Labs     05/16/22  0357 05/16/22  1020 05/17/22  0640   WBC 6.1  --  7.2   HGB 8.6* 10.5* 10.2*   PLT 74*  --  102*     BMP:    Recent Labs     05/15/22  0328 05/16/22  0357 05/17/22  0315    140 134*   K 4.1 3.0* 4.4    111 97*   CO2 31 23 31   BUN 22 15 21   CREATININE 0.6 0.3* 0.6   GLUCOSE 104 78 107     Calcium:  Recent Labs     05/17/22  0315   CALCIUM 8.7     Magnesium:No results for input(s): MG in the last 72 hours. Glucose:  Recent Labs     05/16/22  1519 05/16/22  1943 05/16/22  2019   POCGLU 92 154* 149*     HgbA1C: No results for input(s): LABA1C in the last 72 hours. Lipids: No results for input(s): CHOL, TRIG, HDL, LDL, LDLCALC in the last 72 hours. Radiology reports as per the Radiologist  Radiology: CT HEAD WO CONTRAST    Result Date: 5/10/2022  PROCEDURE: CT HEAD WO CONTRAST CLINICAL INFORMATION: fr surgery planning. COMPARISON: MRI brain dated 5/6/2022. TECHNIQUE: Helical CT of the head with financial marker utilizing Accumetrics protocol with axial, sagittal and coronal reconstructions. All CT scans at this facility use dose modulation, iterative reconstruction, and/or weight-based dosing when appropriate to reduce radiation dose to as low as reasonably achievable. FINDINGS: Redemonstration of a left parietal craniotomy with underlying resection cavity. There is an irregular complex multilobulated cystic lesion involving the resection cavity, better characterized on prior MRI.  Hypodensity surrounding the lesion corresponds to expansile T2/FLAIR signal seen on previous MRI. There is stable mass effect on the right occipital horn. No midline shift is identified. Ventricular size is stable. 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 Orbits are otherwise unremarkable. Paranasal sinuses and mastoid air cells are clear. [place] CT of the head for surgical planning redemonstrating right parietal craniotomy with complex cystic mass subjacent involving the prior resection cavity with adjacent mild expansile hypodensity, better characterized on previous MRI and causing stable mild mass effect on the occipital horn of the right lateral ventricle. **This report has been created using voice recognition software. It may contain minor errors which are inherent in voice recognition technology. ** Final report electronically signed by Dr. Dirk Holland MD on 5/10/2022 10:35 AM                   Assessment: Status postoperative day #6 from reexploration revision of craniotomy for resection of recurrent lesion performed by Dr. Kellee Villalobos, without complication    Principal Problem:    Brain mass  Active Problems:    Brain metastases ([address]  Resolved Problems:    * No resolved hospital problems. *        Plan: Patient is seen and evaluated on the floor with evaluation exam findings reviewed discussed with Dr. Kellee Villalobos with nursing. Patient is resting comfortably this morning with pain moderately controlled. A recurrence of fluid collection and fluctuance beneath the incision site is noted on exam again this morning. No discharge from the incision site was observed. She tolerated aspiration of the fluid accumulation yesterday with nearly 100 mL removed at bedside. An additional 85 mL were removed this morning with a tight Kerlix dressing and stockinette applied.   On exam she remained stable and intact neurologically to her baseline with no additional significant changes noted and was absent fever, headache visual changes or significant deficits. Neurosurgery recommends discharge planning with a follow-up with neurosurgery for suture removal at 2 weeks postop.   Neurosurgery to follow      Electronically signed by Pancho Hairston PA-C on 5/17/2022 at 7:50 AM    Neurosurgery

## 2022-05-17 NOTE — PROGRESS NOTES
1600 Family at bedside upon entering patient's room. Head dressing was off of patient. This RN assessed the site and more fluid accumulation noted under incision. Cleansed surgical incision with betadine. Redressed patient's surgical incision with kerlix and stockinette. Notified Edilia Soulier., PA, of fluid under incision. He ordered to redress patient's head with tight dressing- kerlix and stockinette. No other orders at this time.

## 2022-05-17 NOTE — CARE COORDINATION
5/17/22, 1:28 PM EDT    DISCHARGE ON GOING EVALUATION    [facility] day: 11  Location: 4A-04/004-A Reason for admit: Brain edema ([facility] [address] [G93.6]  Brain metastases ([facility] [address] [C79.31]  Brain mass [G93.89]  Nausea and vomiting, intractability of vomiting not specified, unspecified vomiting type [R11.2]   Procedure:    5/6 CT Head: New edema right temporal, occipital and parietal lobes. There appear to be underlying cystic masses, 1 of which measures 2 cm. 5/6 MRI Brain: Large irregular enhancing cystic mass on the right with surrounding edema. Primary considerations would be recurrent malignancy versus abscess. Persistent linear enhancement along the meninges anterior left parietal   lobe. 5/7 CXR: Unremarkable central line placement. Question of new infiltrate or pneumonitis in right lung base  5/7 CVC placement  5/11 ·               Re-exploration of previous right-sided posterior craniotomy. ·     Surgical resection of right  recurrent cerebral  metastatic tumor involving the right  parieto-occipital regions  ·     Utilizing micro surgical technique and the surgical microscope. ·     Using the intraoperative neuro-navigation. ·     Duraplasty by utilizing piece of dural substitute (dura flex). ·      Application of Mejia head 3-pins head fixation system. 5/16 CT Head WO Contrast Impression:        Redemonstration of right parietal occipital craniotomy with underlying resection cavity in the right temporal occipital region. There has been interval increased pneumocephalus subjacent to the craniotomy and interval development of prominent hypodense   fluid collection in the scalp overlying the craniotomy. This is concerning for possible leak/communication through the craniotomy.          Barriers to Discharge: PT/OT/ST, Neurosurgery, Cardiology following. Neuro checks, Radiation Oncology consult done. Oncology.  Albuterol nebs, Lipitor, Keprra, diabetes management, pain and nausea control, Prednisone, potassium replacement protocols  PCP: Emily Ghotra MD  Readmission Risk Score: 21.3 ( )%  Patient Goals/Plan/Treatment Preferences: Plans return home with [employer], [facility] [place], family to stay [place]. SW following. Refer to SW note.

## 2022-05-17 NOTE — PROGRESS NOTES
Hospitalist Progress Note    Patient:  Krishna Baker      Unit/Bed:4A-04/004-A    YOB: 1933    MRN: [id number]       Acct: [de-identified]     PCP: Agneles Falcon MD    Date of Admission: 5/6/2022    Assessment/Plan:    Brain mass: Patient had right-sided metastatic brain tumor resection/debulking 7/2021 by Dr. Azar Fitzpatrick, neurosurgeon. Nellie Landau presented with worsening headaches, nausea and vomiting.  Patient found to have cerebral edema.  Patient was given hypertonic saline as well as started on Decadron and Keppra for seizure prophylaxis.  Per Dr. Wright Hand note, radiation oncologist considering whole brain radiation versus stereotactic radiotherapy. This to be further discussed on outpatient basis. Patient completed course of Ancef from 5/12/22 to 5/15/22 . This to be further discussed on outpatient basis. Surgical drain removed 5/14/22.    Per Dr. Azar Fitzpatrick recommendations will discharge with 1 weeks steroids. Follow up with neurosurgery NP in clinic in 14 days for suture removal (5/25/22)              -5/16/22  Fluid/Edema right side of head under incisions, neurosurgery PA aspirated fluid and drained about 100 cc of fluid. 5/17/22 additional 85 cc was taken off by neurosurgery PA.            History of breast cancer with mets: Diagnosed with breast cancer 2016 with mets to the liver. Nellie Landau was found to have mets to the brain in which she underwent right-sided metastatic tumor resection/debulking 7/2021 patient was following with Dr. Padmini Yeager, and now follows with Dr. Alicia Ricketts. Per oncology, Dr. Alicia Ricketts recommendation she can follow up as an outpatient for further discussion about systemic therapy and outpatient PET to evaluate for disease outside the brain.       Paroxysmal atrial fibrillation: Found 5/11/2022.  Patient has [id number] Score: 2. And given recent surgery patient not a good candidate for anticoagulation therapy at this time.  Patient did have an echo 5/12/2022 that demonstrated EF 65-70% with dilated dilated left atrium and right ventricular systolic pressure 55 mmHg.  Patient had episode of atrial fibrillation with rapid ventricular sponsor 5/14/2022 which resolved with Lopressor.               -Continue medications including p.o. amiodarone, Imdur and Lopressor.       Acute Normocytic Anemia:  On arrival, Hg stable on arrival. Current Hg 10.2 5/17/22     -Monitor with daily CBC      Urinary tract infection: Patient found to have urinary tract infection with urine culture taken 5/7/2022 grew E. coli.  Patient was on Ancef since 5/11/2022 - 5/15/22.      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     Hyperlipidemia: Continue Lipitor      Hypothyroidism: Continue Synthroid     Anxiety: Continue Zoloft     Acute hypoxic respiratory failure: Resolved -Patient initially required ICU management immediate postoperatively from tumor resection likely secondary to atelectasis.  This subsequently resolved and patient on room air. Expected discharge date:  TBD     Disposition:    [] Home       [] TCU       [] Rehab       [] Psych       [x] SNF       [] [place]       [] Other-    Chief Complaint: Abdominal pain    Hospital Course:   59-year-old female presented to [facility] emergency room department 5/6/2022 with lower extremity numbness, severe headache as well as nausea and vomiting.  Patient has significant past medical history of breast cancer 2016 that metastasized to liver and brain 7/2021 (s/p craniotomy for tumor resection/debulking), temporal arteritis resulting in right eye blindness, left carotid artery stenosis, hyperlipidemia, GERD, hypothyroidism as well as vitamin D deficiency.   Patient expressed that for the previous 2 weeks she has had progressive worsening headaches that resulted in difficulty sleeping as well as associated nausea vomiting within the last few days.  Patient also had fatigue at this time.  While patient was worked up in the emergency department labs were relatively unremarkable however CT head did demonstrate new cerebral edema more significant in the temporal occipital and parietal lobes of the right side. Nida Cisse is also noted to be an underlying cystic mass in which Dr. Alina Portillo, neurosurgery was consulted. Anuj Del Toro was admitted to the ICU 5/6/2022 for close monitoring.  Upon arrival to the ICU patient was started on Decadron as well as hypertonic saline.  An MRI was obtained. Patient underwent right-sided craniotomy 5/11/2022 by Dr. Alina Portillo, neurosurgeon.  Subsequently that evening patient did develop atrial fibrillation with rapid ventricular response in which amiodarone bolus and drip were initiated and Lopressor 5 mg IV was given.  And at that point patient did convert to normal sinus rhythm. Given resolution of atrial fibrillation and patient being stable patient was okay to be transition to ICU stepdown 5/13/2021. 5/14/22 patient had episode of atrial fibrillation with rapid ventricular response that responded well to Lopressor  Patient had no further episodes of A. fib with rapid ventricular response.  Patient was ambulated by nurse 5/15/2022 however due to unsteady gait we will have physical therapy work with patient 5/15/2022 to ensure safe for discharge.      Subjective (past 24 hours):    Patient endorses continued neck pain   Patient states she is \"woosy\" and having N/V this am     Medications:  Reviewed    Infusion Medications    sodium chloride      dextrose       Scheduled Medications    bupivacaine  30 mL IntraDERmal Once    amiodarone  200 mg Oral Daily    metoprolol tartrate  25 mg Oral BID    calcium-cholecalciferol  1 tablet Oral Daily    lidocaine  2 patch Topical Daily    traZODone  50 mg Oral Nightly    cetirizine  5 mg Oral Daily    predniSONE  1 mg Oral Daily    sodium chloride flush  5-40 mL IntraVENous 2 times per day    insulin lispro  0-6 Units SubCUTAneous TID WC    insulin lispro  0-3 Units SubCUTAneous Nightly    miconazole   Topical BID    levETIRAcetam  500 mg Oral BID    isosorbide mononitrate  30 mg Oral Daily    docusate sodium  100 mg Oral BID    levothyroxine  100 mcg Oral Daily    pantoprazole  40 mg Oral QAM AC    [Held by provider] aspirin  81 mg Oral Daily    sertraline  50 mg Oral BID    atorvastatin  10 mg Oral Nightly    [Held by provider] enoxaparin  40 mg SubCUTAneous Daily     PRN Meds: albuterol, potassium chloride **OR** potassium alternative oral replacement **OR** potassium chloride, ALPRAZolam, sodium chloride flush, sodium chloride, magnesium sulfate, glucose, dextrose bolus **OR** dextrose bolus, glucagon (rDNA), dextrose, albuterol, fentanNYL, fentanNYL, hydrALAZINE, HYDROcodone 5 mg - acetaminophen **OR** HYDROcodone 5 mg - acetaminophen, phenol, melatonin, polyethylene glycol, ondansetron **OR** ondansetron      Intake/Output Summary (Last 24 hours) at 5/17/2022 0631  Last data filed at 5/16/2022 2309  Gross per 24 hour   Intake 490 ml   Output --   Net 490 ml       Diet:  ADULT ORAL NUTRITION SUPPLEMENT; Breakfast, Lunch, Dinner; Frozen Oral Supplement  ADULT DIET; Regular;  No Caffeine    Exam:  BP (!) 130/43   Pulse 65   Temp 97.9 °F (36.6 °C) (Oral)   Resp 16   Ht 5' (1.524 m)   Wt 147 lb 14.4 oz (67.1 kg)   LMP  (LMP Unknown) Comment: age 52 ovaries intact  SpO2 100%   BMI 28.88 kg/m²     General appearance - oriented to person, place, and time- left IJ present   Chest - clear to auscultation, no wheezes, rales or rhonchi, symmetric air entry  Heart - normal rate and regular rhythm, S1 and S2 normal  Abdomen - soft, nontender, nondistended, left abdominal hernia noted   Obese: No;   Neurological - alert, oriented, normal speech, no focal findings or movement disorder noted  Extremities - peripheral pulses normal, no pedal edema, no clubbing or cyanosis  Skin - warm and dry       Labs:   Recent Labs 05/16/22  0357 05/16/22  1020   WBC 6.1  --    HGB 8.6* 10.5*   HCT 28.1* 33.0*   PLT 74*  --      Recent Labs     05/15/22  0328 05/16/22  0357 05/17/22  0315    140 134*   K 4.1 3.0* 4.4    111 97*   CO2 31 23 31   BUN 22 15 21   CREATININE 0.6 0.3* 0.6   CALCIUM 8.2* 6.1* 8.7     No results for input(s): AST, ALT, BILIDIR, BILITOT, ALKPHOS in the last 72 hours. No results for input(s): INR in the last 72 hours. No results for input(s): Verlena Jairo in the last 72 hours. Microbiology:      Urinalysis:      Lab Results   Component Value Date    NITRU NEGATIVE 05/07/2022    WBCUA 0-2 05/07/2022    BACTERIA NONE SEEN 05/07/2022    RBCUA 0-2 05/07/2022    BLOODU NEGATIVE 05/07/2022    SPECGRAV 1.011 10/29/2021    GLUCOSEU NEGATIVE 05/07/2022       Radiology:  CT HEAD WO CONTRAST   Final Result    Redemonstration of right parietal occipital craniotomy with underlying resection cavity in the right temporal occipital region. There has been interval increased pneumocephalus subjacent to the craniotomy and interval development of prominent hypodense    fluid collection in the scalp overlying the craniotomy. This is concerning for possible leak/communication through the craniotomy. **This report has been created using voice recognition software. It may contain minor errors which are inherent in voice recognition technology. **      Final report electronically signed by Dr. Yuriy Gardiner MD on 5/16/2022 10:31 AM      MRI BRAIN W WO CONTRAST   Final Result       1. Postoperative changes involving the right parietal and posterior temporal calvarium. 2. Thickening at the surgical site and in the right lateral ventricle. 3. 3.7 x 1.3 cm area of abnormal signal intensity in the right posterior temporal lobe consistent with postoperative changes and hemorrhage at the surgical site. 4. There is edema in the right temporal and parietal regions.    4. There is mild atrophy and dilatation of the lateral ventricles. 5.. Probable ischemic changes in the white matter. 6. There is a small extra-axial lesion over the left posterior frontal lobe possibly representing an incidental meningioma. .               **This report has been created using voice recognition software. It may contain minor errors which are inherent in voice recognition technology. **         Final report electronically signed by DR Javier Jones on 5/13/2022 8:16 AM      CT HEAD WO CONTRAST   Final Result       1. Status post removal of the mass in the right posterior temporal region with air at the surgical site. 2. Air in the frontal horn of the right lateral ventricle. 3. Patient edema in the right temporal and parietal lobes. 4. Probable ischemic changes in the white matter. 5. Postoperative changes involving the right globe. .               **This report has been created using voice recognition software. It may contain minor errors which are inherent in voice recognition technology. **      Final report electronically signed by DR Javier Jones on 5/11/2022 4:52 PM      XR CHEST PORTABLE   Final Result   Mild bibasilar opacities compatible with atelectasis and/or infiltrates. This document has been electronically signed by: Anuj Bolden MD on    05/11/2022 02:28 AM      CT HEAD WO CONTRAST   Final Result    [place] CT of the head for surgical planning redemonstrating right parietal craniotomy with complex cystic mass subjacent involving the prior resection cavity with adjacent mild expansile hypodensity, better characterized on previous MRI and causing    stable mild mass effect on the occipital horn of the right lateral ventricle. **This report has been created using voice recognition software. It may contain minor errors which are inherent in voice recognition technology. **      Final report electronically signed by Dr. Kyle Phelps MD on 5/10/2022 10:35 AM      XR CHEST PORTABLE Final Result   1. Unremarkable central line placement. 2. Question of new infiltrate or pneumonitis in right lung base. This document has been electronically signed by: Julio César Arizmendi MD    on 05/07/2022 06:10 AM      XR CHEST PORTABLE   Final Result   Impression:      No acute processes. This document has been electronically signed by: Patrizia Reis MD on    05/07/2022 04:25 AM      MRI BRAIN W WO CONTRAST   Final Result   Impression:      Large irregular enhancing cystic mass on the right with surrounding edema. Primary considerations would be recurrent malignancy versus abscess      Persistent linear enhancement along the meninges anterior left parietal    lobe. This document has been electronically signed by: Patrizia Reis MD on    05/06/2022 11:54 PM      CT Head WO Contrast   Final Result   Impression:      New edema right temporal, occipital and parietal lobes. There appear to be underlying cystic masses, 1 of which measures 2 cm. These are not well assessed, recommend follow-up contrast-enhanced MRI. This document has been electronically signed by: Patrizia Reis MD on    05/06/2022 09:06 PM      All CTs at this facility use dose modulation techniques and iterative    reconstructions, and/or weight-based dosing   when appropriate to reduce radiation to a low as reasonably achievable. DVT prophylaxis: [] Lovenox                                 [x] SCDs                                 [] SQ Heparin                                 [] Encourage ambulation           [] Already on Anticoagulation     Code Status: DNR-CCA    PT/OT Eval Status:  Following    Tele:   [x] yes             [] no    Active Hospital Problems    Diagnosis Date Noted    Brain metastases [facility]) [C79.31]      Priority: Medium    Brain mass [G93.89] 06/28/2021       Electronically signed by Devan Morgan DO on 5/17/2022 at 6:31 AM

## 2022-05-17 NOTE — PROGRESS NOTES
[address] [address]  INPATIENT PHYSICAL THERAPY  DAILY NOTE  [facility] NEUROSCIENCES 4A - 4A-04/004-A    Time In: 1292  Time Out: 7270  Timed Code Treatment Minutes: 24 Minutes  Minutes: 24          Date: 2022  Patient Name: Hortencia De Anda,  Gender:  female        MRN: [id number]  : 1933  (80 y.o.)     Referring Practitioner: JACK Rowell APRN-CNP  Diagnosis: brain edema  Additional Pertinent Hx: Pt admitted from neuro office due to nausea, H/A. Hx:  Breast CA with mets to brain and underwent craniotomy with resection and debulking of Rt side anterior brain tumor . Ct+ for edema MRI + for lesion and pt underwent reexpliration and revision for craniotomy with resection of brain lesion 22     Prior Level of Function:  Lives With: Other (comment) (Pt has HHA during the day and her brother stays with her at night.)  Type of Home: House  Home Layout: One level  Home Access: Level entry  [address] [facility] bed,Walker, 4 wheeled,Walker, rolling,Oxygen   Bathroom Shower/Tub: Tub/Shower unit  Bathroom Toilet: Handicap height  Bathroom Equipment: Grab bars in shower,Shower chair  Bathroom Accessibility: Accessible    Receives Help From: [employer])  ADL Assistance: Needs assistance  Homemaking Responsibilities: No  Ambulation Assistance: Needs assistance  Transfer Assistance: Needs assistance  Active : No    Restrictions/Precautions:  Restrictions/Precautions: General Precautions,Fall Risk,Surgical Protocols  Position Activity Restriction  Other position/activity restrictions: Cranial drain     SUBJECTIVE: RN approved session. Patient in chair upon arrival and agreeable to therapy. Pt stated she wanted to get back in bed and didn't feel good. Pt stated she was very tired and had just gotten sick, nurse notificed.     PAIN: 10/10: Head    Vitals: Vitals not assessed per clinical judgement, see nursing flowsheet    OBJECTIVE:  Bed Mobility:  Sit to Supine: [employer], with

## 2022-05-18 LAB
ANION GAP SERPL CALCULATED.3IONS-SCNC: 7 MEQ/L (ref 8–16)
BASOPHILS # BLD: 0.1 %
BASOPHILS ABSOLUTE: 0 THOU/MM3 (ref 0–0.1)
BUN BLDV-MCNC: 21 MG/DL (ref 7–22)
CALCIUM SERPL-MCNC: 8.2 MG/DL (ref 8.5–10.5)
CHLORIDE BLD-SCNC: 101 MEQ/L (ref 98–111)
CO2: 29 MEQ/L (ref 23–33)
CREAT SERPL-MCNC: 0.7 MG/DL (ref 0.4–1.2)
EOSINOPHIL # BLD: 1.3 %
EOSINOPHILS ABSOLUTE: 0.1 THOU/MM3 (ref 0–0.4)
ERYTHROCYTE [DISTWIDTH] IN BLOOD BY AUTOMATED COUNT: 14.5 % (ref 11.5–14.5)
ERYTHROCYTE [DISTWIDTH] IN BLOOD BY AUTOMATED COUNT: 49.4 FL (ref 35–45)
GFR SERPL CREATININE-BSD FRML MDRD: 79 ML/MIN/1.73M2
GLUCOSE BLD-MCNC: 115 MG/DL (ref 70–108)
GLUCOSE BLD-MCNC: 132 MG/DL (ref 70–108)
GLUCOSE BLD-MCNC: 87 MG/DL (ref 70–108)
GLUCOSE BLD-MCNC: 88 MG/DL (ref 70–108)
GLUCOSE BLD-MCNC: 92 MG/DL (ref 70–108)
HCT VFR BLD CALC: 31.2 % (ref 37–47)
HEMOGLOBIN: 9.4 GM/DL (ref 12–16)
IMMATURE GRANS (ABS): 0.05 THOU/MM3 (ref 0–0.07)
IMMATURE GRANULOCYTES: 0.7 %
LYMPHOCYTES # BLD: 21.2 %
LYMPHOCYTES ABSOLUTE: 1.5 THOU/MM3 (ref 1–4.8)
MCH RBC QN AUTO: 28.3 PG (ref 26–33)
MCHC RBC AUTO-ENTMCNC: 30.1 GM/DL (ref 32.2–35.5)
MCV RBC AUTO: 94 FL (ref 81–99)
MONOCYTES # BLD: 10.6 %
MONOCYTES ABSOLUTE: 0.8 THOU/MM3 (ref 0.4–1.3)
NUCLEATED RED BLOOD CELLS: 0 /100 WBC
PLATELET # BLD: 96 THOU/MM3 (ref 130–400)
PMV BLD AUTO: 12.1 FL (ref 9.4–12.4)
POTASSIUM SERPL-SCNC: 4.1 MEQ/L (ref 3.5–5.2)
RBC # BLD: 3.32 MILL/MM3 (ref 4.2–5.4)
SEG NEUTROPHILS: 66.1 %
SEGMENTED NEUTROPHILS ABSOLUTE COUNT: 4.7 THOU/MM3 (ref 1.8–7.7)
SODIUM BLD-SCNC: 137 MEQ/L (ref 135–145)
WBC # BLD: 7.1 THOU/MM3 (ref 4.8–10.8)

## 2022-05-18 PROCEDURE — APPSS60 APP SPLIT SHARED TIME 46-60 MINUTES: Performed by: PHYSICIAN ASSISTANT

## 2022-05-18 PROCEDURE — 94760 N-INVAS EAR/PLS OXIMETRY 1: CPT

## 2022-05-18 PROCEDURE — 97116 GAIT TRAINING THERAPY: CPT

## 2022-05-18 PROCEDURE — 2580000003 HC RX 258: Performed by: STUDENT IN AN ORGANIZED HEALTH CARE EDUCATION/TRAINING PROGRAM

## 2022-05-18 PROCEDURE — 6370000000 HC RX 637 (ALT 250 FOR IP): Performed by: PHYSICIAN ASSISTANT

## 2022-05-18 PROCEDURE — 99233 SBSQ HOSP IP/OBS HIGH 50: CPT | Performed by: INTERNAL MEDICINE

## 2022-05-18 PROCEDURE — 6370000000 HC RX 637 (ALT 250 FOR IP): Performed by: STUDENT IN AN ORGANIZED HEALTH CARE EDUCATION/TRAINING PROGRAM

## 2022-05-18 PROCEDURE — 6370000000 HC RX 637 (ALT 250 FOR IP): Performed by: NURSE PRACTITIONER

## 2022-05-18 PROCEDURE — 97110 THERAPEUTIC EXERCISES: CPT

## 2022-05-18 PROCEDURE — 2060000000 HC ICU INTERMEDIATE R&B

## 2022-05-18 PROCEDURE — 97530 THERAPEUTIC ACTIVITIES: CPT

## 2022-05-18 PROCEDURE — 85025 COMPLETE CBC W/AUTO DIFF WBC: CPT

## 2022-05-18 PROCEDURE — 99024 POSTOP FOLLOW-UP VISIT: CPT | Performed by: NEUROLOGICAL SURGERY

## 2022-05-18 PROCEDURE — 82948 REAGENT STRIP/BLOOD GLUCOSE: CPT

## 2022-05-18 PROCEDURE — 2580000003 HC RX 258: Performed by: PHYSICIAN ASSISTANT

## 2022-05-18 PROCEDURE — 97535 SELF CARE MNGMENT TRAINING: CPT

## 2022-05-18 PROCEDURE — 2700000000 HC OXYGEN THERAPY PER DAY

## 2022-05-18 PROCEDURE — 80048 BASIC METABOLIC PNL TOTAL CA: CPT

## 2022-05-18 RX ADMIN — SERTRALINE 50 MG: 50 TABLET, FILM COATED ORAL at 08:19

## 2022-05-18 RX ADMIN — HYDROCODONE BITARTRATE AND ACETAMINOPHEN 1 TABLET: 5; 325 TABLET ORAL at 17:07

## 2022-05-18 RX ADMIN — ATORVASTATIN CALCIUM 10 MG: 10 TABLET, FILM COATED ORAL at 20:50

## 2022-05-18 RX ADMIN — PANTOPRAZOLE SODIUM 40 MG: 40 TABLET, DELAYED RELEASE ORAL at 05:36

## 2022-05-18 RX ADMIN — Medication 1 TABLET: at 08:20

## 2022-05-18 RX ADMIN — SODIUM CHLORIDE, PRESERVATIVE FREE 10 ML: 5 INJECTION INTRAVENOUS at 20:51

## 2022-05-18 RX ADMIN — MICONAZOLE NITRATE: 20 POWDER TOPICAL at 08:18

## 2022-05-18 RX ADMIN — LEVOTHYROXINE SODIUM 100 MCG: 0.1 TABLET ORAL at 05:36

## 2022-05-18 RX ADMIN — SODIUM CHLORIDE: 9 INJECTION, SOLUTION INTRAVENOUS at 18:27

## 2022-05-18 RX ADMIN — CETIRIZINE HYDROCHLORIDE 5 MG: 5 TABLET ORAL at 08:20

## 2022-05-18 RX ADMIN — SODIUM CHLORIDE: 9 INJECTION, SOLUTION INTRAVENOUS at 03:35

## 2022-05-18 RX ADMIN — AMIODARONE HYDROCHLORIDE 200 MG: 200 TABLET ORAL at 08:20

## 2022-05-18 RX ADMIN — ISOSORBIDE MONONITRATE 30 MG: 30 TABLET, EXTENDED RELEASE ORAL at 08:20

## 2022-05-18 RX ADMIN — TRAZODONE HYDROCHLORIDE 50 MG: 50 TABLET ORAL at 20:50

## 2022-05-18 RX ADMIN — LEVETIRACETAM 500 MG: 500 TABLET, FILM COATED ORAL at 20:50

## 2022-05-18 RX ADMIN — LEVETIRACETAM 500 MG: 500 TABLET, FILM COATED ORAL at 08:19

## 2022-05-18 RX ADMIN — Medication 3 MG: at 20:50

## 2022-05-18 RX ADMIN — MICONAZOLE NITRATE: 20 POWDER TOPICAL at 20:51

## 2022-05-18 RX ADMIN — SERTRALINE 50 MG: 50 TABLET, FILM COATED ORAL at 20:51

## 2022-05-18 RX ADMIN — PREDNISONE 1 MG: 1 TABLET ORAL at 08:19

## 2022-05-18 RX ADMIN — METOPROLOL TARTRATE 12.5 MG: 25 TABLET, FILM COATED ORAL at 11:11

## 2022-05-18 RX ADMIN — DOCUSATE SODIUM 100 MG: 100 CAPSULE, LIQUID FILLED ORAL at 20:50

## 2022-05-18 ASSESSMENT — PAIN DESCRIPTION - DESCRIPTORS
DESCRIPTORS: ACHING;BURNING
DESCRIPTORS: BURNING

## 2022-05-18 ASSESSMENT — PAIN DESCRIPTION - LOCATION
LOCATION: HEAD
LOCATION: HEAD

## 2022-05-18 ASSESSMENT — PAIN DESCRIPTION - FREQUENCY
FREQUENCY: CONTINUOUS
FREQUENCY: CONTINUOUS

## 2022-05-18 ASSESSMENT — PAIN SCALES - GENERAL
PAINLEVEL_OUTOF10: 6
PAINLEVEL_OUTOF10: 0
PAINLEVEL_OUTOF10: 5
PAINLEVEL_OUTOF10: 0

## 2022-05-18 ASSESSMENT — PAIN DESCRIPTION - ORIENTATION
ORIENTATION: RIGHT;POSTERIOR
ORIENTATION: RIGHT;POSTERIOR

## 2022-05-18 ASSESSMENT — PAIN DESCRIPTION - ONSET
ONSET: ON-GOING
ONSET: ON-GOING

## 2022-05-18 NOTE — PROGRESS NOTES
[address] [address]  INPATIENT PHYSICAL THERAPY  DAILY NOTE  [facility] NEUROSCIENCES 4A - 4A-04/004-A    Time In: 0840  Time Out: 0920  Timed Code Treatment Minutes: 40 Minutes  Minutes: 40          Date: 2022  Patient Name: Deborah Virk,  Gender:  female        MRN: [id number]  : 1933  (80 y.o.)     Referring Practitioner: JACK SANTIAGO-CNP  Diagnosis: brain edema  Additional Pertinent Hx: Pt admitted from neuro office due to nausea, H/A. Hx:  Breast CA with mets to brain and underwent craniotomy with resection and debulking of Rt side anterior brain tumor . Ct+ for edema MRI + for lesion and pt underwent reexpliration and revision for craniotomy with resection of brain lesion 22     Prior Level of Function:  Lives With: Other (comment) (Pt has HHA during the day and her brother stays with her at night.)  Type of Home: House  Home Layout: One level  Home Access: Level entry  [address] [facility] bed,Walker, 4 wheeled,Walker, rolling,Oxygen   Bathroom Shower/Tub: Tub/Shower unit  Bathroom Toilet: Handicap height  Bathroom Equipment: Grab bars in shower,Shower chair  Bathroom Accessibility: Accessible    Receives Help From: [employer])  ADL Assistance: Needs assistance  Homemaking Responsibilities: No  Ambulation Assistance: Needs assistance  Transfer Assistance: Needs assistance  Active : No    Restrictions/Precautions:  Restrictions/Precautions: General Precautions,Fall Risk,Surgical Protocols  Position Activity Restriction  Other position/activity restrictions: Cranial drain     SUBJECTIVE: RN approved session. Patient in bed at arrival, pleasant and agreeable to therapy. Patient stated she is eager to go home and frequently asked when she was leaving. MD entered part way through session to drain incision and asked to get patient in bed. Increased time needed for completion     PAIN: Patient denied pain stating see overall feels better.      Vitals: Heart Rate: 66-80 with all activities. OBJECTIVE:  Bed Mobility:  Supine to Sit: Contact Guard Assistance, with increased time for completion, tactile and verbal cues needed for hand placement, with rail and bed of bed raised   Sit to Supine: [employer], with rail, with increased time for completion, cues for safety awareness. Scooting: Contact Guard Assistance, with head of bed raised, with increased time for completion, cues to properly sequeence     Transfers:  Sit to Stand: [employer], with increased time for completion, cues for safety awareness. Stand to [address] [address], with increased time for completion, cues for safety awareness and hand placement. *x2 trials     Ambulation:  Minimal Assistance, with cues for safety, with increased time for completion  Distance: 5'x2  Surface: Level Tile  Device:Rolling Walker  Gait Deviations: Forward Flexed Posture, Slow Gillian, Decreased Step Length Bilaterally, Decreased Weight Shift Bilaterally, Decreased Heel Strike Bilaterally, Mild Path Deviations and minimal assistance needed to properly use walker. Cues to maintain within walker for proper support and safety with minimal ability to maintain. Occasional unsteadiness noted during ambulation however not loss of balance. Balance:  Dynamic Standing Balance: Contact Guard Assistance, Minimal Assistance, with unilateral support on walker and sink, patient was able to wash her hands without LOB however requiring min assistance due to posterior sway when donning pants after using the restroom. Exercise:  Patient was guided in 1 set(s) 10 reps of exercise to both lower extremities. Ankle pumps, Glut sets and Quad sets. Patient required encouragement to complete exercises. Rest breaks needed to recover from fatigue. Exercises were completed for increased independence with functional mobility.     Functional Outcome Measures: Completed  AM-PAC Inpatient Mobility Raw Score : 17  AM-PAC Inpatient T-Scale Score : 42.13    ASSESSMENT:  Assessment: Patient progressing toward established goals. However will continue to benefit from therapy at discharge due to gait deficits listed above, decreased endurance, and unsteadiness with all standing activities. Activity Tolerance:  Patient tolerance of  treatment: good. Equipment Recommendations:Equipment Needed: No  Other: Pt has 4WW and RW  Discharge Recommendations: Continue to assess pending progress and Patient would benefit from continued PT at discharge  Plan: Plan:  (6x/wk N)    Patient Education  Patient Education: Plan of Care    Goals:  Patient goals : did not state  Short Term Goals  Time Frame for Short term goals: until discharge  Short term goal 1: Pt to go supine <->sit, SBA, to get in/out of bed. May use rail  Short term goal 2: Pt to get up/down from various seated surfaces, CGA to get up to walk. Short term goal 3: Pt to walk with RW >= 50 ft, CGA for home mobility  Long Term Goals  Time Frame for Long term goals : NA due to expected short LOS    Following session, patient left in safe position with all fall risk precautions in place.

## 2022-05-18 NOTE — CARE COORDINATION
5/18/22, 1:35 PM EDT    DISCHARGE ON GOING EVALUATION    [facility] day: 12  Location: 4A-04/004-A Reason for admit: Brain edema ([facility] [address] [G93.6]  Brain metastases ([facility] [address] [C79.31]  Brain mass [G93.89]  Nausea and vomiting, intractability of vomiting not specified, unspecified vomiting type [R11.2]   Procedure:    5/6 CT Head: New edema right temporal, occipital and parietal lobes. There appear to be underlying cystic masses, 1 of which measures 2 cm. 5/6 MRI Brain: Large irregular enhancing cystic mass on the right with surrounding edema. Primary considerations would be recurrent malignancy versus abscess. Persistent linear enhancement along the meninges anterior left parietal   lobe. 5/7 CXR: Unremarkable central line placement. Question of new infiltrate or pneumonitis in right lung base  5/7 CVC placement  5/11 ·               Re-exploration of previous right-sided posterior craniotomy. ·     Surgical resection of right  recurrent cerebral  metastatic tumor involving the right  parieto-occipital regions  ·     Utilizing micro surgical technique and the surgical microscope. ·     Using the intraoperative neuro-navigation. ·     Duraplasty by utilizing piece of dural substitute (dura flex). ·      Application of Mejia head 3-pins head fixation system. 5/16 CT Head WO Contrast Impression:                      Redemonstration of right parietal occipital craniotomy with underlying resection cavity in the right temporal occipital region. There has been interval increased pneumocephalus subjacent to the craniotomy and interval development of prominent hypodense   fluid collection in the scalp overlying the craniotomy. This is concerning for possible leak/communication through the craniotomy.      Barriers to Discharge: PT/OT/ST, Neurosurgery, Cardiology following. Neuro checks, Radiation Oncology consult done. Oncology.  Albuterol nebs, Lipitor, Keprra, diabetes management, pain and nausea control, Prednisone, potassium replacement protocols  PCP: Yvrose Jensen MD  Readmission Risk Score: 22 ( )%  Patient Goals/Plan/Treatment Preferences: Plan is home with [employer], [facility], 24 hour supervision from family. SW following.

## 2022-05-18 NOTE — PLAN OF CARE
Problem: Respiratory - Adult  Goal: Clear lung sounds  Description: Clear lung sounds  Outcome: Progressing    Patient lung sounds are considered normal for their current lung condition. No signs of distress noted.  Current treatment regimen appropriate

## 2022-05-18 NOTE — FLOWSHEET NOTE
05/18/22 0851   Treatment Team Notification   Reason for Communication Evaluate  (fluid collection)   Team Member Name Ariana Angelo   Treatment Team Role Physician Assistant   Method of Communication Face to face   Response Other (Comment)   Notification Time 3223     Notified EMELYN Davidson of fluid collection near incision. 60mL of serosanguineous fluid removed, dressing reinforced.

## 2022-05-18 NOTE — PROGRESS NOTES
[address]  Occupational Therapy  Daily Note  Time:   Time In: 422  Time Out: 1101  Timed Code Treatment Minutes: 26 Minutes  Minutes: 26        Date: 2022  Patient Name: Hortencia De Anda,   Gender: female      Room: [facility]004-A  MRN: [id number]  : 1933  (80 y.o.)  Referring Practitioner: Jude Salazar PA-C  Diagnosis: Brain mass  Additional Pertinent Hx: Per chart review, \"Latanya Rod is an 51-year-old white female who presented to [facility] on 2022 for lower extremity numbness headaches and nausea and vomiting. 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 Per report patient presents that for the last 2 weeks she had had progressive headache she reported 3-4 headaches a week she is not [place] difficulty sleeping she stated that she has had nausea and vomiting that started a few days prior. She endorsed feeling fatigued. She denied any dysuria or urgency but did states she had decreased urine output. In the emergency department labs were normal.  Troponin was negative. EKG was completed which was negative. She underwent CT head which showed cerebral edema with a right temporal occipital parietal mass. Consult to neurosurgery Dr. Myrna Medina. He recommended admission to the ICU for close neurological monitoring. Patient was started on Decadron and hypertonic saline. MRI brain was ordered. Patient was admitted to ICU in stable condition.  patient underwent surgical resection on . She did have an episode of atrial fib with RVR overnight. This has since resolved.  patient is awake and following commands.   Patient is stable for transfer to neuro stepdown. \"    Restrictions/Precautions:  Restrictions/Precautions: General Precautions,Fall Risk,Surgical Protocols  Position Activity Restriction  Other position/activity restrictions: Cranial drain     SUBJECTIVE: RN approved session and reported she would assess patients vitals prior to OT session. Patient supine in bed upon OT arrival, agreeable to session. Patient reported feelings of anxiety during session and was provided with support. PAIN: Pain near incision site reported. Vitals: Nurse checked vitals prior to session    COGNITION: Slow Processing, Decreased Insight, Decreased Problem Solving, Decreased Safety Awareness, Impaired Attention, Difficulty Following Commands and Impulsive    ADL:   Lower Extremity Dressing: Minimal Assistance. to doff pants and depends and colby new set. Toileting: Minimal Assistance. for dc area hygiene. Toilet Transfer: Minimal Assistance. <> MOD A. two attempts as patient required clothing change and required rest break. verbal and tactile cues for orientation and hand placement and assist to lower and raise patient. William Confer BALANCE:  Sitting Balance:  Stand By Assistance. while seated EOB. patient c/o dizziness initially which resolved with time and breathing. Standing Balance: Contact Guard Assistance. with 2w/w, patient demo unsteadiness and heavy WB with UE through 2w/w     BED MOBILITY:  Supine to Sit: Contact Guard Assistance verbal and tactile cues for hand placement and to bring LE to the floor. TRANSFERS:  Sit to Stand:  Minimal Assistance. <> MOD A. to stand from EOB. verbal and tactile cues for hand placement. Stand to Sit: Minimal Assistance. to sit onto recliner, patient required MOD tactile cues for orientation to the recliner. FUNCTIONAL MOBILITY:  Assistive Device: Rolling Walker  Assist Level:  Minimal Assistance <> MOD A. Distance:  To and from bathroom     Patient had challenges navigating and maneuvering 2w/w to and from bathroom. This appeared to be due to visual and/or cognitive deficits and requires further assessment. Patient not comprehending simple step directions and is exhibiting deficits with motor planning and sequencing. ASSESSMENT:  Assessment: Patient continues to have complaints of dizziness, feeling unwell and weakness however patient participated in functional mobility household distances this date. On 5/16 aspiration of the fluid resulted in approximately 100 mL of mostly serosanguineous fluid removed from the incision site as a result of the CT and patient complaint. Patient demo increased visual deficits and difficulty following simple step directions. Patient would benefit from continued skilled OT services. Activity Tolerance:  Patient tolerance of  treatment: fair. Patient was cooperative however reported feeling anxious this date. She had difficulty comprehending and following simple step directions and tended to demo impulsive behaviors. Discharge Recommendations: Continue to assess pending progress, Subacute/skilled nursing facility, Inpatient Rehabilitation, ECF with OT, 24 hour assistance or supervision, Not safe to return home at this time and Patient would benefit from continued OT at discharge  Equipment Recommendations: Equipment Needed: Yes  Mobility Devices: ADL Assistive Devices  Other: continue to assess.   Plan: Times per Week: 5x  Times per Day: Daily  Current Treatment Recommendations: Functional mobility training,Endurance training,Neuromuscular re-education,Equipment evaluation, education, & procurement,Patient/Caregiver education & training,Safety education & training,Self-Care / ADL    Patient Education  Patient Education: Role of OT, Plan of Care, ADL's, Energy Conservation, Precautions, Equipment Education, Reviewed Prior Education, Importance of Increasing Activity and Assistive Device Safety    Goals  Short Term Goals  Time Frame for Short term goals: upon discharge  Short Term Goal 1: OTR to assess sit to stand t/f and all mobility. - MET, REVISED  Short Term Goal 2: Patient will complete UB ADL routine with SBA and AE prn. Short Term Goal 3: Patient will learn compensatory strategies for scanning environment and locating needed item with 0-1 verbal cues to increase ADL success and safety. Revised Short-Term Goals  Short Term Goals  Time Frame for Short term goals: upon discharge  Short Term Goal 1: Patient will complete functional ambulation household distances with MIN A. to increase ease and safety with self care tasks. Short Term Goal 2: Patient will complete UB ADL routine with SBA and AE prn. Short Term Goal 3: Patient will learn compensatory strategies for scanning environment and locating needed item with 0-1 verbal cues to increase ADL success and safety. Following session, patient left in safe position with all fall risk precautions in place.

## 2022-05-18 NOTE — PLAN OF CARE
Problem: Discharge Planning  Goal: Discharge to home or other facility with appropriate resources  Description: Discharge planning in progress. Outcome: Progressing  Note: Discharge planning in process and discussed with patient/family.  Social work consulted for any additional needs. Care manager aware of discharge needs.         Problem: Pain  Goal: Verbalizes/displays adequate comfort level or baseline comfort level  Description: PRN Norco for pain management. Encouraged to report pain on pain scale. Outcome: Progressing  Note: Patient able to use 0-10 pain scale. . C/O burning sensation at incision site 8/10. PRN Norco given. Agreeable to take PRN pain medications.        Problem: Safety - Adult  Goal: Free from fall injury  Description: Family at bedside. Bed alarm activated. Outcome: Progressing  Note: Patient remained free from falls this shift. Bed is in low position with alarm on and siderails up x2. Education given on use of call light and patient voiced understanding. Call light and beside table within reach. Arm band and falling star in place. Hourly rounds completed. Will continue to monitor.         Problem: Skin/Tissue Integrity  Goal: Absence of new skin breakdown  Description: 1.  Monitor for areas of redness and/or skin breakdown  2.  Assess vascular access sites hourly  3.  Every 4-6 hours minimum:  Change oxygen saturation probe site  4.  Every 4-6 hours:  If on nasal continuous positive airway pressure, respiratory therapy assess nares and determine need for appliance change or resting period. Outcome: Progressing  Note: No signs of skin breakdown.  Skin warm, dry, and intact.  Mucous membranes pink and moist. Pt has incision site closed with sutures on head open to air.   Assistance with turns/ambulation provided PRN.  Will continue to monitor.      Care plan reviewed with patient.  Patient verbalizes understanding of the plan of care and contributed to goal setting.

## 2022-05-18 NOTE — PLAN OF CARE
Problem: Discharge Planning  Goal: Discharge to home or other facility with appropriate resources  Description: Discharge planning in progress. Outcome: Progressing  Flowsheets (Taken 5/18/2022 0815)  Discharge to home or other facility with appropriate resources: Identify barriers to discharge with patient and caregiver  Note: Patient educated that discharge plan is still in progress. Problem: Pain  Goal: Verbalizes/displays adequate comfort level or baseline comfort level  Description: PRN Norco for pain management. Encouraged to report pain on pain scale. Outcome: Progressing  Problem: Safety - Adult  Goal: Free from fall injury  Description: Family at bedside. Bed alarm activated. Outcome: Progressing  Flowsheets (Taken 5/18/2022 0948)  Free From Fall Injury: Instruct family/caregiver on patient safety  Note: Patient remains free from falls this shift. Fall precautions in place with bed/chair exit alarmed. Fall sign posted and fall armband in place. Nonskid footwear used with transferring. Educated patient to use call light when in need of staff assistance with transferring, ambulating, and other activities of daily living. Patient appropriately uses call light this shift. Problem: Chronic Conditions and Co-morbidities  Goal: Patient's chronic conditions and co-morbidity symptoms are monitored and maintained or improved  Description: Monitoring co-morbidities. Outcome: Progressing  Flowsheets (Taken 5/18/2022 0815)  Care Plan - Patient's Chronic Conditions and Co-Morbidity Symptoms are Monitored and Maintained or Improved: Monitor and assess patient's chronic conditions and comorbid symptoms for stability, deterioration, or improvement     Problem: Nutrition Deficit:  Goal: Optimize nutritional status  Description: NPO at this time. Monitoring for nutritional needs.    Outcome: Progressing  Flowsheets (Taken 5/18/2022 0948)  Nutrient intake appropriate for improving, restoring, or maintaining nutritional needs: Assess nutritional status and recommend course of action  Note: Patient tolerating current diet. Problem: ABCDS Injury Assessment  Goal: Absence of physical injury  Description: No physical injury noted. Outcome: Progressing     Problem: Skin/Tissue Integrity  Goal: Absence of new skin breakdown  Description: 1. Monitor for areas of redness and/or skin breakdown  2. Assess vascular access sites hourly  3. Every 4-6 hours minimum:  Change oxygen saturation probe site  4. Every 4-6 hours:  If on nasal continuous positive airway pressure, respiratory therapy assess nares and determine need for appliance change or resting period. Outcome: Progressing  Note: Patient exhibits no new skin breakdown this shift. Patient repositioned Q2H and as needed with staff assistance. All skin integrity issuse charted in Flowsheets. Will continue to monitor. Care plan reviewed with patient and family. Patient and familly verbalize understanding of the plan of care and contribute to goal setting.

## 2022-05-18 NOTE — PROGRESS NOTES
Hospitalist Progress Note    Patient:  Matthew Haines      Unit/Bed:4A-04/004-A    YOB: 1933    MRN: [id number]       Acct: [de-identified]     PCP: Leyla Cartwright MD    Date of Admission: 5/6/2022    Assessment/Plan:    Brain mass: Patient had right-sided metastatic brain tumor resection/debulking 7/2021 by Dr. Milo Salinas, neurosurgeon. Emerita Razo presented with worsening headaches, nausea and vomiting.  Patient found to have cerebral edema.  Patient was given hypertonic saline as well as started on Decadron and Keppra for seizure prophylaxis.  Per Dr. Prashant Kelly note, radiation oncologist considering whole brain radiation versus stereotactic radiotherapy. This to be further discussed on outpatient basis. Patient completed course of Ancef from 5/12/22 to 5/15/22 . This to be further discussed on outpatient basis. Surgical drain removed 5/14/22.    Per Dr. Milo Salinas recommendations will discharge with 1 weeks steroids. Follow up with neurosurgery NP in clinic in 14 days for suture removal (5/25/22)              -5/16/22  Fluid/Edema right side of head under incisions, neurosurgery PA aspirated fluid and drained about 100 cc of fluid. 5/17/22 additional 85 cc was taken off by neurosurgery PA.    -Patient has golf ball size --- under incisions              History of breast cancer with mets: Diagnosed with breast cancer 2016 with mets to the liver.  Patient was found to have mets to the brain in which she underwent right-sided metastatic tumor resection/debulking 7/2021 patient was following with Dr. Ally Patel, and now follows with Dr. Roberth Jimenez. Per oncology, Dr. Roberth Jimenez recommendation she can follow up as an outpatient for further discussion about systemic therapy and outpatient PET to evaluate for disease outside the brain.       Paroxysmal atrial fibrillation: Found 5/11/2022.  Patient has [id number] Score: 2. And given recent surgery patient not a good candidate for anticoagulation therapy at this time.  Patient did have an echo 5/12/2022 that demonstrated EF 65-70% with dilated dilated left atrium and right ventricular systolic pressure 55 mmHg.  Patient had episode of atrial fibrillation with rapid ventricular sponsor 5/14/2022 which resolved with Lopressor.               -Continue medications including p.o. amiodarone, Imdur and Lopressor.       Acute Normocytic Anemia:  On arrival, Hg stable on arrival. Current Hg 9.4 5/18/22              -Monitor with daily CBC      Urinary tract infection: Patient found to have urinary tract infection with urine culture taken 5/7/2022 grew E. coli.  Patient was on Ancef since 5/11/2022 - 5/15/22.      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     Hyperlipidemia: Continue Lipitor      Hypothyroidism: Continue Synthroid     Anxiety: Continue Zoloft      Acute hypoxic respiratory failure: Resolved -Patient initially required ICU management immediate postoperatively from tumor resection likely secondary to atelectasis.  This subsequently resolved and patient on room air.     Discussed with patient and patient's family yesterday, she wishes to go home.  Family states someone will be present to assist her at all times     Expected discharge date:  5/19/22     Disposition:    [x] Home       [] TCU       [] Rehab       [] Psych       [] SNF       [] [place]       [] Other-    Chief Complaint: Abdominal Pain     Hospital Course:   42-year-old female presented to [facility] emergency room department 5/6/2022 with lower extremity numbness, severe headache as well as nausea and vomiting.  Patient has significant past medical history of breast cancer 2016 that metastasized to liver and brain 7/2021 (s/p craniotomy for tumor resection/debulking), temporal arteritis resulting in right eye blindness, left carotid artery stenosis, hyperlipidemia, GERD, hypothyroidism as well as vitamin D deficiency. Patient expressed that for the previous 2 weeks she has had progressive worsening headaches that resulted in difficulty sleeping as well as associated nausea vomiting within the last few days.  Patient also had fatigue at this time.  While patient was worked up in the emergency department labs were relatively unremarkable however CT head did demonstrate new cerebral edema more significant in the temporal occipital and parietal lobes of the right side. Joseph Perez is also noted to be an underlying cystic mass in which Dr. Maira Cuenca, neurosurgery was consulted. Krista Byrne was admitted to the ICU 5/6/2022 for close monitoring.  Upon arrival to the ICU patient was started on Decadron as well as hypertonic saline.  An MRI was obtained. Patient underwent right-sided craniotomy 5/11/2022 by Dr. Maira Cuenca, neurosurgeon.  Subsequently that evening patient did develop atrial fibrillation with rapid ventricular response in which amiodarone bolus and drip were initiated and Lopressor 5 mg IV was given.  And at that point patient did convert to normal sinus rhythm. Given resolution of atrial fibrillation and patient being stable patient was okay to be transition to ICU stepdown 5/13/2021.   5/14/22 patient had episode of atrial fibrillation with rapid ventricular response that responded well to Lopressor  Patient had no further episodes of A. fib with rapid ventricular response.  Patient was ambulated by nurse 5/15/2022 however due to unsteady gait we will have physical therapy work with patient 5/15/2022 to ensure safe for discharge.     Subjective (past 24 hours):   Patient denies headache or any acute pain   Patient does have golf ball size fluid collection under incision site     Medications:  Reviewed    Infusion Medications    sodium chloride 75 mL/hr at 05/18/22 0335    sodium chloride      dextrose       Scheduled Medications    metoprolol tartrate  12.5 mg Oral BID    bupivacaine  30 mL IntraDERmal Once    amiodarone  200 mg Oral Daily    calcium-cholecalciferol  1 tablet Oral Daily    lidocaine  2 patch Topical Daily    traZODone  50 mg Oral Nightly    cetirizine  5 mg Oral Daily    predniSONE  1 mg Oral Daily    sodium chloride flush  5-40 mL IntraVENous 2 times per day    insulin lispro  0-6 Units SubCUTAneous TID WC    insulin lispro  0-3 Units SubCUTAneous Nightly    miconazole   Topical BID    levETIRAcetam  500 mg Oral BID    isosorbide mononitrate  30 mg Oral Daily    docusate sodium  100 mg Oral BID    levothyroxine  100 mcg Oral Daily    pantoprazole  40 mg Oral QAM AC    [Held by provider] aspirin  81 mg Oral Daily    sertraline  50 mg Oral BID    atorvastatin  10 mg Oral Nightly    [Held by provider] enoxaparin  40 mg SubCUTAneous Daily     PRN Meds: albuterol, potassium chloride **OR** potassium alternative oral replacement **OR** potassium chloride, ALPRAZolam, sodium chloride flush, sodium chloride, magnesium sulfate, glucose, dextrose bolus **OR** dextrose bolus, glucagon (rDNA), dextrose, albuterol, fentanNYL, fentanNYL, hydrALAZINE, HYDROcodone 5 mg - acetaminophen **OR** HYDROcodone 5 mg - acetaminophen, phenol, melatonin, polyethylene glycol, ondansetron **OR** ondansetron      Intake/Output Summary (Last 24 hours) at 5/18/2022 0845  Last data filed at 5/18/2022 0334  Gross per 24 hour   Intake 3302 ml   Output 900 ml   Net 2402 ml       Diet:  ADULT ORAL NUTRITION SUPPLEMENT; Breakfast, Lunch, Dinner; Frozen Oral Supplement  ADULT DIET; Regular;  No Caffeine    Exam:  /70   Pulse 70   Temp 97.2 °F (36.2 °C) (Oral)   Resp 16   Ht 5' (1.524 m)   Wt 151 lb 6.4 oz (68.7 kg)   LMP  (LMP Unknown) Comment: age 52 ovaries intact  SpO2 98%   BMI 29.57 kg/m²     General appearance - alert, well appearing, and in no distress  Chest - clear to auscultation, no wheezes, rales or rhonchi, symmetric air entry  Heart - normal rate and regular rhythm, S1 and S2 normal  Abdomen - soft, nontender, nondistended, left abdominal hernia noted   Obese: No; Protuberant: No   Neurological - alert, oriented, normal speech, no focal findings or movement disorder noted. Golf ball size fluid collection under incision   Extremities - peripheral pulses normal, no pedal edema, no clubbing or cyanosis  Skin - warm and dry       Labs:   Recent Labs     05/16/22  0357 05/16/22  0357 05/16/22  1020 05/17/22  0640 05/18/22  0330   WBC 6.1  --   --  7.2 7.1   HGB 8.6*   < > 10.5* 10.2* 9.4*   HCT 28.1*   < > 33.0* 34.1* 31.2*   PLT 74*  --   --  102* 96*    < > = values in this interval not displayed. Recent Labs     05/16/22  0357 05/17/22  0315 05/18/22  0330    134* 137   K 3.0* 4.4 4.1    97* 101   CO2 23 31 29   BUN 15 21 21   CREATININE 0.3* 0.6 0.7   CALCIUM 6.1* 8.7 8.2*     No results for input(s): AST, ALT, BILIDIR, BILITOT, ALKPHOS in the last 72 hours. No results for input(s): INR in the last 72 hours. No results for input(s): [place] in the last 72 hours. Microbiology:      Urinalysis:      Lab Results   Component Value Date    NITRU NEGATIVE 05/07/2022    WBCUA 0-2 05/07/2022    BACTERIA NONE SEEN 05/07/2022    RBCUA 0-2 05/07/2022    BLOODU NEGATIVE 05/07/2022    SPECGRAV 1.011 10/29/2021    GLUCOSEU NEGATIVE 05/07/2022       Radiology:  CT HEAD WO CONTRAST   Final Result    Redemonstration of right parietal occipital craniotomy with underlying resection cavity in the right temporal occipital region. There has been interval increased pneumocephalus subjacent to the craniotomy and interval development of prominent hypodense    fluid collection in the scalp overlying the craniotomy. This is concerning for possible leak/communication through the craniotomy. **This report has been created using voice recognition software. It may contain minor errors which are inherent in voice recognition technology. **      Final report electronically signed by Dr. Dara Gli cystic mass subjacent involving the prior resection cavity with adjacent mild expansile hypodensity, better characterized on previous MRI and causing    stable mild mass effect on the occipital horn of the right lateral ventricle. **This report has been created using voice recognition software. It may contain minor errors which are inherent in voice recognition technology. **      Final report electronically signed by Dr. Frank Jackson MD on 5/10/2022 10:35 AM      XR CHEST PORTABLE   Final Result   1. Unremarkable central line placement. 2. Question of new infiltrate or pneumonitis in right lung base. This document has been electronically signed by: Jayna Adams MD    on 05/07/2022 06:10 AM      XR CHEST PORTABLE   Final Result   Impression:      No acute processes. This document has been electronically signed by: Jeremy Moody MD on    05/07/2022 04:25 AM      MRI BRAIN W WO CONTRAST   Final Result   Impression:      Large irregular enhancing cystic mass on the right with surrounding edema. Primary considerations would be recurrent malignancy versus abscess      Persistent linear enhancement along the meninges anterior left parietal    lobe. This document has been electronically signed by: Jeremy Moody MD on    05/06/2022 11:54 PM      CT Head WO Contrast   Final Result   Impression:      New edema right temporal, occipital and parietal lobes. There appear to be underlying cystic masses, 1 of which measures 2 cm. These are not well assessed, recommend follow-up contrast-enhanced MRI. This document has been electronically signed by: Jeremy Moody MD on    05/06/2022 09:06 PM      All CTs at this facility use dose modulation techniques and iterative    reconstructions, and/or weight-based dosing   when appropriate to reduce radiation to a low as reasonably achievable.           DVT prophylaxis: [] Lovenox                                 [x] SCDs                                 [] SQ Heparin                                 [] Encourage ambulation           [] Already on Anticoagulation     Code Status: DNR-CCA    PT/OT Eval Status: following     Tele:   [x] yes             [] no    Active Hospital Problems    Diagnosis Date Noted    Brain metastases [facility]) [C79.31]      Priority: Medium    Brain mass [G93.89] 06/28/2021       Electronically signed by Libby Headley DO on 5/18/2022 at 8:45 AM

## 2022-05-19 VITALS
TEMPERATURE: 99.5 F | BODY MASS INDEX: 29.72 KG/M2 | SYSTOLIC BLOOD PRESSURE: 125 MMHG | DIASTOLIC BLOOD PRESSURE: 65 MMHG | OXYGEN SATURATION: 95 % | HEIGHT: 60 IN | HEART RATE: 62 BPM | WEIGHT: 151.4 LBS | RESPIRATION RATE: 16 BRPM

## 2022-05-19 LAB
ANION GAP SERPL CALCULATED.3IONS-SCNC: 7 MEQ/L (ref 8–16)
BASOPHILS # BLD: 0 %
BASOPHILS ABSOLUTE: 0 THOU/MM3 (ref 0–0.1)
BUN BLDV-MCNC: 14 MG/DL (ref 7–22)
CALCIUM SERPL-MCNC: 8.2 MG/DL (ref 8.5–10.5)
CHLORIDE BLD-SCNC: 104 MEQ/L (ref 98–111)
CO2: 28 MEQ/L (ref 23–33)
CREAT SERPL-MCNC: 0.5 MG/DL (ref 0.4–1.2)
EOSINOPHIL # BLD: 1.6 %
EOSINOPHILS ABSOLUTE: 0.1 THOU/MM3 (ref 0–0.4)
ERYTHROCYTE [DISTWIDTH] IN BLOOD BY AUTOMATED COUNT: 14.3 % (ref 11.5–14.5)
ERYTHROCYTE [DISTWIDTH] IN BLOOD BY AUTOMATED COUNT: 49 FL (ref 35–45)
GFR SERPL CREATININE-BSD FRML MDRD: > 90 ML/MIN/1.73M2
GLUCOSE BLD-MCNC: 84 MG/DL (ref 70–108)
GLUCOSE BLD-MCNC: 94 MG/DL (ref 70–108)
GLUCOSE BLD-MCNC: 95 MG/DL (ref 70–108)
HCT VFR BLD CALC: 29.2 % (ref 37–47)
HEMOGLOBIN: 8.8 GM/DL (ref 12–16)
IMMATURE GRANS (ABS): 0.05 THOU/MM3 (ref 0–0.07)
IMMATURE GRANULOCYTES: 0.8 %
LYMPHOCYTES # BLD: 21.5 %
LYMPHOCYTES ABSOLUTE: 1.3 THOU/MM3 (ref 1–4.8)
MCH RBC QN AUTO: 28.4 PG (ref 26–33)
MCHC RBC AUTO-ENTMCNC: 30.1 GM/DL (ref 32.2–35.5)
MCV RBC AUTO: 94.2 FL (ref 81–99)
MONOCYTES # BLD: 10.8 %
MONOCYTES ABSOLUTE: 0.7 THOU/MM3 (ref 0.4–1.3)
NUCLEATED RED BLOOD CELLS: 0 /100 WBC
PLATELET # BLD: 95 THOU/MM3 (ref 130–400)
PMV BLD AUTO: 12.2 FL (ref 9.4–12.4)
POTASSIUM SERPL-SCNC: 4.4 MEQ/L (ref 3.5–5.2)
RBC # BLD: 3.1 MILL/MM3 (ref 4.2–5.4)
SEG NEUTROPHILS: 65.3 %
SEGMENTED NEUTROPHILS ABSOLUTE COUNT: 4 THOU/MM3 (ref 1.8–7.7)
SODIUM BLD-SCNC: 139 MEQ/L (ref 135–145)
WBC # BLD: 6.1 THOU/MM3 (ref 4.8–10.8)

## 2022-05-19 PROCEDURE — 85025 COMPLETE CBC W/AUTO DIFF WBC: CPT

## 2022-05-19 PROCEDURE — 97530 THERAPEUTIC ACTIVITIES: CPT

## 2022-05-19 PROCEDURE — 6370000000 HC RX 637 (ALT 250 FOR IP): Performed by: STUDENT IN AN ORGANIZED HEALTH CARE EDUCATION/TRAINING PROGRAM

## 2022-05-19 PROCEDURE — 6370000000 HC RX 637 (ALT 250 FOR IP): Performed by: PHYSICIAN ASSISTANT

## 2022-05-19 PROCEDURE — 97535 SELF CARE MNGMENT TRAINING: CPT

## 2022-05-19 PROCEDURE — 6360000002 HC RX W HCPCS: Performed by: PHYSICIAN ASSISTANT

## 2022-05-19 PROCEDURE — 6370000000 HC RX 637 (ALT 250 FOR IP): Performed by: NURSE PRACTITIONER

## 2022-05-19 PROCEDURE — 36592 COLLECT BLOOD FROM PICC: CPT

## 2022-05-19 PROCEDURE — 82948 REAGENT STRIP/BLOOD GLUCOSE: CPT

## 2022-05-19 PROCEDURE — 99024 POSTOP FOLLOW-UP VISIT: CPT | Performed by: NEUROLOGICAL SURGERY

## 2022-05-19 PROCEDURE — 97110 THERAPEUTIC EXERCISES: CPT

## 2022-05-19 PROCEDURE — 99239 HOSP IP/OBS DSCHRG MGMT >30: CPT | Performed by: INTERNAL MEDICINE

## 2022-05-19 PROCEDURE — 80048 BASIC METABOLIC PNL TOTAL CA: CPT

## 2022-05-19 RX ORDER — AMIODARONE HYDROCHLORIDE 200 MG/1
200 TABLET ORAL DAILY
Qty: 30 TABLET | Refills: 0 | Status: SHIPPED | OUTPATIENT
Start: 2022-05-19 | End: 2022-07-19 | Stop reason: SDUPTHER

## 2022-05-19 RX ORDER — HYDROCODONE BITARTRATE AND ACETAMINOPHEN 5; 325 MG/1; MG/1
2 TABLET ORAL EVERY 8 HOURS PRN
Qty: 18 TABLET | Refills: 0 | Status: SHIPPED | OUTPATIENT
Start: 2022-05-19 | End: 2022-07-05 | Stop reason: SDUPTHER

## 2022-05-19 RX ADMIN — HYDROCODONE BITARTRATE AND ACETAMINOPHEN 1 TABLET: 5; 325 TABLET ORAL at 00:43

## 2022-05-19 RX ADMIN — PANTOPRAZOLE SODIUM 40 MG: 40 TABLET, DELAYED RELEASE ORAL at 06:36

## 2022-05-19 RX ADMIN — ISOSORBIDE MONONITRATE 30 MG: 30 TABLET, EXTENDED RELEASE ORAL at 10:00

## 2022-05-19 RX ADMIN — METOPROLOL TARTRATE 12.5 MG: 25 TABLET, FILM COATED ORAL at 09:59

## 2022-05-19 RX ADMIN — HYDROCODONE BITARTRATE AND ACETAMINOPHEN 1 TABLET: 5; 325 TABLET ORAL at 08:09

## 2022-05-19 RX ADMIN — AMIODARONE HYDROCHLORIDE 200 MG: 200 TABLET ORAL at 10:00

## 2022-05-19 RX ADMIN — ONDANSETRON 4 MG: 2 INJECTION INTRAMUSCULAR; INTRAVENOUS at 07:51

## 2022-05-19 RX ADMIN — SERTRALINE 50 MG: 50 TABLET, FILM COATED ORAL at 09:58

## 2022-05-19 RX ADMIN — PREDNISONE 1 MG: 1 TABLET ORAL at 09:59

## 2022-05-19 RX ADMIN — MICONAZOLE NITRATE: 20 POWDER TOPICAL at 10:00

## 2022-05-19 RX ADMIN — LEVOTHYROXINE SODIUM 100 MCG: 0.1 TABLET ORAL at 06:36

## 2022-05-19 RX ADMIN — LEVETIRACETAM 500 MG: 500 TABLET, FILM COATED ORAL at 09:59

## 2022-05-19 RX ADMIN — Medication 1 TABLET: at 10:00

## 2022-05-19 RX ADMIN — DOCUSATE SODIUM 100 MG: 100 CAPSULE, LIQUID FILLED ORAL at 09:59

## 2022-05-19 RX ADMIN — CETIRIZINE HYDROCHLORIDE 5 MG: 5 TABLET ORAL at 10:00

## 2022-05-19 ASSESSMENT — PAIN SCALES - GENERAL
PAINLEVEL_OUTOF10: 10
PAINLEVEL_OUTOF10: 10

## 2022-05-19 ASSESSMENT — PAIN DESCRIPTION - DESCRIPTORS
DESCRIPTORS: ACHING
DESCRIPTORS: ACHING

## 2022-05-19 ASSESSMENT — PAIN DESCRIPTION - ORIENTATION
ORIENTATION: RIGHT;POSTERIOR
ORIENTATION: RIGHT;POSTERIOR

## 2022-05-19 ASSESSMENT — PAIN DESCRIPTION - ONSET
ONSET: ON-GOING
ONSET: ON-GOING

## 2022-05-19 ASSESSMENT — PAIN DESCRIPTION - FREQUENCY
FREQUENCY: CONTINUOUS
FREQUENCY: CONTINUOUS

## 2022-05-19 ASSESSMENT — PAIN DESCRIPTION - PAIN TYPE
TYPE: ACUTE PAIN;SURGICAL PAIN
TYPE: ACUTE PAIN

## 2022-05-19 ASSESSMENT — PAIN DESCRIPTION - LOCATION
LOCATION: HEAD
LOCATION: HEAD;NECK

## 2022-05-19 NOTE — PROGRESS NOTES
[address]  Occupational Therapy  Daily Note  Time:   Time In: 1005  Time Out: 1100  Timed Code Treatment Minutes: 54 Minutes  Minutes: 55          Date: 2022  Patient Name: Wild Harrison,   Gender: female      Room: 4A-004-A  MRN: [id number]  : 1933  (80 y.o.)  Referring Practitioner: Jenny Matthews PA-C  Diagnosis: Brain mass  Additional Pertinent Hx: Per chart review, \"Latanya Rod is an 22-year-old white female who presented to [facility] on 2022 for lower extremity numbness headaches and nausea and vomiting. 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 Per report patient presents that for the last 2 weeks she had had progressive headache she reported 3-4 headaches a week she is not [place] difficulty sleeping she stated that she has had nausea and vomiting that started a few days prior. She endorsed feeling fatigued. She denied any dysuria or urgency but did states she had decreased urine output. In the emergency department labs were normal.  Troponin was negative. EKG was completed which was negative. She underwent CT head which showed cerebral edema with a right temporal occipital parietal mass. Consult to neurosurgery Dr. Cass Herrmann. He recommended admission to the ICU for close neurological monitoring. Patient was started on Decadron and hypertonic saline. MRI brain was ordered. Patient was admitted to ICU in stable condition.  patient underwent surgical resection on . She did have an episode of atrial fib with RVR overnight. This has since resolved.  patient is awake and following commands.   Patient is stable for transfer to neuro stepdown. \"    Restrictions/Precautions:  Restrictions/Precautions: General Precautions,Fall Risk,Surgical Protocols  Position Activity Restriction    SUBJECTIVE: RN okayed OT session. Upon arrival patient was resting in bed. Pt was agreeable to OT session. Pt's niece in room. PAIN: 4/10: head    Vitals: Vitals not assessed per clinical judgement, see nursing flowsheet    COGNITION: Slow Processing, Decreased Recall, Decreased Insight, Decreased Problem Solving and Decreased Safety Awareness    ADL:   Lower Extremity Dressing: Moderate Assistance and with increased time for completion. To don/doff briefs while sitting on toilet. Pt demonstrates difficulty to thread B feet. Toileting: Minimal Assistance. Assist for dc care. Toilet Transfer: Minimal Assistance and with increased time for completion. From standard toilet with L grab bar. BALANCE:  Sitting Balance:  Stand By Assistance, Minimal Assistance. Pt sitting balance fluctuated sitting EOB with 2 UE release. Pt demonstrates posterior lean at times requiring min A and mod vcs to correct. Standing Balance: Contact Guard Assistance, with verbal cues . BUE supported on walker. BED MOBILITY:  Supine to Sit: [employer], with rail, with increased time for completion    Sit to Supine: Minimal Assistance, with head of bed flat, without rail, with increased time for completion    Scooting: [employer], with increased time for completion to scoot B hips to EOB. TRANSFERS:  Sit to Stand:  [employer], with increased time for completion, cues for hand placement. Stand to Sit: Contact Guard Assistance. FUNCTIONAL MOBILITY:  Assistive Device: Rolling Walker  Assist Level:  Contact Guard Assistance and with increased time for completion. Distance: To and from bathroom & EOB to recliner. Slow pace, No LOB, assist to maneuver walker d/t decreased vision.  Pt required step by step cues for orientation to objects. Additional Activities. Guided patient through BUE AROM this date x10 reps x1 set sitting EOB in order to increase BUE strength and improve activity tolerance for ADLs and homemaking tasks. ASSESSMENT:   Activity Tolerance:  Patient tolerance of  treatment: good. Discharge Recommendations: ECF with OT and 24 hour assistance or supervision  Equipment Recommendations: Equipment Needed: Yes  Mobility Devices: ADL Assistive Devices  Other: continue to assess. Plan: Times per Week: 5x  Times per Day: Daily  Current Treatment Recommendations: Functional mobility training,Endurance training,Neuromuscular re-education,Equipment evaluation, education, & procurement,Patient/Caregiver education & training,Safety education & training,Self-Care / ADL    Patient Education  Patient Education: Role of OT, Plan of Care, ADL's, Precautions, Importance of Increasing Activity and Assistive Device Safety    Goals  Short Term Goals  Time Frame for Short term goals: upon discharge  Short Term Goal 1: Patient will complete functional ambulation household distances with MIN A. to increase ease and safety with self care tasks. Short Term Goal 2: Patient will complete UB ADL routine with SBA and AE prn. Short Term Goal 3: Patient will learn compensatory strategies for scanning environment and locating needed item with 0-1 verbal cues to increase ADL success and safety. Following session, patient left in safe position with all fall risk precautions in place.

## 2022-05-19 NOTE — PLAN OF CARE
Problem: Discharge Planning  Goal: Discharge to home or other facility with appropriate resources  Description: Discharge planning in progress. Outcome: Progressing  Note: Patient unsafe to return home alone at this time, family wishes to take her home with 24H care     Problem: Pain  Goal: Verbalizes/displays adequate comfort level or baseline comfort level  Description: PRN Norco for pain management. Encouraged to report pain on pain scale. Outcome: Progressing  Note: Patient able to use 0-10 pain scale. Denies pain at this time. Patient has a pain goal of \"no pain. \" Agreeable to take PRN pain medications. Problem: Safety - Adult  Goal: Free from fall injury  Description: Family at bedside. Bed alarm activated. Outcome: Progressing  Free From Fall Injury: Instruct family/caregiver on patient safety  Note: Patient remains free from falls this shift. Fall precautions in place with bed/chair exit alarmed. Fall sign posted and fall armband in place. Nonskid footwear used with transferring. Educated patient to use call light when in need of staff assistance with transferring, ambulating, and other activities of daily living. Patient appropriately uses call light this shift. Problem: Chronic Conditions and Co-morbidities  Goal: Patient's chronic conditions and co-morbidity symptoms are monitored and maintained or improved  Description: Monitoring co-morbidities. Outcome: Progressing     Problem: Nutrition Deficit:  Goal: Optimize nutritional status  Description: NPO at this time. Monitoring for nutritional needs. Outcome: Progressing     Problem: ABCDS Injury Assessment  Goal: Absence of physical injury  Description: No physical injury noted. Outcome: Progressing  Note: Hourly rounding in place to anticipate patient needs, such as assistance with pain, toileting, or repositioning, in order to decrease risk for injuries such as falls.         Problem: Skin/Tissue Integrity  Goal: Absence of new skin breakdown  Description: 1. Monitor for areas of redness and/or skin breakdown  2. Assess vascular access sites hourly  3. Every 4-6 hours minimum:  Change oxygen saturation probe site  4. Every 4-6 hours:  If on nasal continuous positive airway pressure, respiratory therapy assess nares and determine need for appliance change or resting period. Outcome: Progressing  Note: No signs of skin breakdown. Skin warm, dry, and intact. Mucous membranes pink and moist.  Assistance with turns/ambulation provided PRN. Will continue to monitor.

## 2022-05-19 NOTE — PROGRESS NOTES
Family Medicine Progress Notes/Coverage    Today's Date: 5/19/22  4A-04/004-A  Medical Record # [id number]  CSN/Account # [de-identified]      Ms. Rod admitted on 5/6/2022        Subjective / Interval History :     Seen Dr Libertad Tobar and Dr Angie Turner at bedside, and her niece  Patient still had requiring swelling/seroma on the right parietal head  Denies any headache vision is coming back, no weakness no numbness no slurred speech  No abdominal pain no back pain  Reviewed notes, consults, laboratory and radiology results,    Objective:       Physical Exam:  Patient Vitals for the past 24 hrs:   BP Temp Temp src Pulse Resp SpO2   05/19/22 0805 (!) 174/49 98.9 °F (37.2 °C) Oral 66 18 94 %   05/19/22 0431 136/60 -- -- 69 18 95 %   05/18/22 2353 135/64 98.1 °F (36.7 °C) Oral 69 16 98 %   05/18/22 2047 (!) 124/48 98.2 °F (36.8 °C) Oral 65 18 97 %   05/18/22 1504 (!) 128/55 98.4 °F (36.9 °C) Oral 65 20 96 %   05/18/22 1303 -- -- -- -- -- 94 %   05/18/22 1102 (!) 169/52 98.2 °F (36.8 °C) Oral 68 18 100 %       General Appearance:  Alert, cooperative, no distress, appears stated age   HEENT:  Neck:  There is a soft tissue swelling on the right parietal head, sutures are dry no sign of infection     Chest/Lungs:  Heart:  Clear to auscultation  Regular rate and rhythm   Abdomen:  Back:  Soft nontender   Extremities:  Neurological Exam:  No edema  Cranial nerves intact II - XII, motor is 5/5 in all extremities       Assessment:     Admitting Diagnosis:    Brain edema (HCC) [G93.6]  Brain metastases (HCC) [C79.31]  Brain mass [G93.89]  Nausea and vomiting, intractability of vomiting not specified, unspecified vomiting type [R11.2]    Active Hospital Problems    Diagnosis Date Noted    Brain metastases [facility]) [C79.31]      Priority: Medium    Brain mass [G93.89] 06/28/2021       Plan:     Discussed plans with the nursing staff  Patient is being discharged today  Follow-up in the office in 5 days    Medications, Laboratories and Imaging results:    Scheduled Meds:   metoprolol tartrate  12.5 mg Oral BID    bupivacaine  30 mL IntraDERmal Once    amiodarone  200 mg Oral Daily    calcium-cholecalciferol  1 tablet Oral Daily    lidocaine  2 patch Topical Daily    traZODone  50 mg Oral Nightly    cetirizine  5 mg Oral Daily    predniSONE  1 mg Oral Daily    sodium chloride flush  5-40 mL IntraVENous 2 times per day    insulin lispro  0-6 Units SubCUTAneous TID WC    insulin lispro  0-3 Units SubCUTAneous Nightly    miconazole   Topical BID    levETIRAcetam  500 mg Oral BID    isosorbide mononitrate  30 mg Oral Daily    docusate sodium  100 mg Oral BID    levothyroxine  100 mcg Oral Daily    pantoprazole  40 mg Oral QAM AC    [Held by provider] aspirin  81 mg Oral Daily    sertraline  50 mg Oral BID    atorvastatin  10 mg Oral Nightly    [Held by provider] enoxaparin  40 mg SubCUTAneous Daily     Continuous Infusions:   sodium chloride 75 mL/hr at 05/18/22 1827    sodium chloride      dextrose       PRN Meds:albuterol, potassium chloride **OR** potassium alternative oral replacement **OR** potassium chloride, ALPRAZolam, sodium chloride flush, sodium chloride, magnesium sulfate, glucose, dextrose bolus **OR** dextrose bolus, glucagon (rDNA), dextrose, albuterol, fentanNYL, fentanNYL, hydrALAZINE, HYDROcodone 5 mg - acetaminophen **OR** HYDROcodone 5 mg - acetaminophen, phenol, melatonin, polyethylene glycol, ondansetron **OR** ondansetron    Imaging:    Lab Review :    Lab Results   Component Value Date    WBC 6.1 05/19/2022    HGB 8.8 (L) 05/19/2022    HCT 29.2 (L) 05/19/2022    MCV 94.2 05/19/2022    PLT 95 (L) 05/19/2022     Lab Results   Component Value Date    CREATININE 0.5 05/19/2022    BUN 14 05/19/2022     05/19/2022    K 4.4 05/19/2022     05/19/2022    CO2 28 05/19/2022 Lab Results   Component Value Date    CKTOTAL 70 06/28/2021     Lab Results   Component Value Date    ALT 14 05/06/2022    AST 24 05/06/2022    ALKPHOS 43 05/06/2022    BILITOT 0.5 05/06/2022     Lab Results   Component Value Date    LIPASE 20.4 05/06/2022         Electronically signed by Cris Ingram MD on 5/19/22 at 10:02 AM AMELIA Villalobos M.D.

## 2022-05-19 NOTE — CARE COORDINATION
5/19/22, 10:48 AM EDT    DISCHARGE PLANNING EVALUATION    Updated Latanya's [facility]  Jennifer Colón that Benny Casillas would be discharged today. Spoke with Katy Pham from [facility] and made her aware of patient's discharge. She had questions about patient's head incision needs, let her know I would ask the patient's nurse and call her back. Called Katy Pham back and let her know the patient would need dry dressing changes on the wound. 5/19/22, 10:51 AM EDT    Patient goals/plan/ treatment preferences discussed by  and . Patient goals/plan/ treatment preferences reviewed with patient/ family. Patient/ family verbalize understanding of discharge plan and are in agreement with goal/plan/treatment preferences. Understanding was demonstrated using the teach back method. AVS provided by RN at time of discharge, which includes all necessary medical information pertaining to the patients current course of illness, treatment, post-discharge goals of care, and treatment preferences. Services At/After Discharge: Home Health, Nursing service, OT and PT- [facility] and [facility]       IMM Letter  IMM Letter given to Patient/Family/Significant other/Guardian/POA/by[de-identified] CM  IMM Letter date given[de-identified] 05/17/22  IMM Letter time given[de-identified] 80     Latanya will be discharged to home with her family today, where they will provide 24 hour care for her. She is current with [facility]  for RN, PT and OT services. She is also current with [facility],  Sandralee Bence ([phone]). Her family will be transporting her home. YADY Batista is aware of discharge plan, as well as Jennifer Colón and Katy Pham from [facility].

## 2022-05-19 NOTE — DISCHARGE SUMMARY
Hospitalist Discharge Summary        Patient: Scott Huang  YOB: 1933  MRN: [id number]   Acct: [de-identified]    Primary Care Physician: Alfonzo Jay MD    Admit date  5/6/2022    Discharge date:  5/19/2022 9:58 AM    Chief Complaint on presentation :-  Abdominal Pain     Discharge Assessment and Plan:-   Brain mass: Patient had right-sided metastatic brain tumor resection/debulking 7/2021 by Dr. Danielle Jose, neurosurgeon. Seth Prakash presented with worsening headaches, nausea and vomiting.  Patient found to have cerebral edema.  Patient was given hypertonic saline as well as started on Decadron and Keppra for seizure prophylaxis.  Per Dr. Manish Leon note, radiation oncologist considering whole brain radiation versus stereotactic radiotherapy. This to be further discussed on outpatient basis. Patient completed course of Ancef from 5/12/22 to 5/15/22 . This to be further discussed on outpatient basis. Surgical drain removed 5/14/22.  5/16/22  Fluid/Edema right side of head under incisions, neurosurgery PA aspirated fluid and drained about 100 cc of fluid. 5/17/22 additional 85 cc was taken off by neurosurgery PA.  Patient has golf ball size --- under incisions  that required drainage x2 on 5/18/2022.   Per Dr. Danielle oJse recommendations will discharge with 1 weeks steroids. Follow up with neurosurgery NP in clinic in 14 days for suture removal (5/25/22). 3 days of Norco were given to patient and if patient needs more beyond this amount she was instructed to call neurosurgery office.              History of breast cancer with mets: Diagnosed with breast cancer 2016 with mets to the liver.  Patient was found to have mets to the brain in which she underwent right-sided metastatic tumor resection/debulking 7/2021 patient was following with Dr. Odell Lawson, and now follows with Dr. Norma Abraham.  Per oncology, Dr. Norma Abraham recommendation she can follow up as an outpatient for further discussion about systemic therapy and outpatient PET to evaluate for disease outside the brain.       Paroxysmal atrial fibrillation: Found 5/11/2022.  Patient has [id number] Score: 2. And given recent surgery patient not a good candidate for anticoagulation therapy at this time.  Patient did have an echo 5/12/2022 that demonstrated EF 65-70% with dilated dilated left atrium and right ventricular systolic pressure 55 mmHg.  Patient had episode of atrial fibrillation with rapid ventricular sponsor 5/14/2022 which resolved with Lopressor. Patient is to continue medications including p.o. amiodarone, Imdur and Lopressor.  Follow up with PCP in 3-5 days.      Acute Normocytic Anemia:  On arrival, Hg stable on arrival. Current Hg 8.8/hematocrit 29.2. Will have patient complete CBC in 3 days prior to follow up with PCP in 3-5 days.                Urinary tract infection: Patient found to have urinary tract infection with urine culture taken 5/7/2022 grew E. coli.  Patient was on Ancef since 5/11/2022 - 5/15/22.      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     Hyperlipidemia: Continue Lipitor      Hypothyroidism: Continue Synthroid     Anxiety: Continue Zoloft         Discussed with patient and patient's family yesterday, she wishes to go home. Family states someone will be present to assist her at all times        Initial H and P and Hospital course:-  49-year-old female presented to [facility] emergency room department 5/6/2022 with lower extremity numbness, severe headache as well as nausea and vomiting.  Patient has significant past medical history of breast cancer 2016 that metastasized to liver and brain 7/2021 (s/p craniotomy for tumor resection/debulking), temporal arteritis resulting in right eye blindness, left carotid artery stenosis, hyperlipidemia, GERD, hypothyroidism as well as vitamin D deficiency.   Patient expressed that for the previous 2 weeks she has had progressive worsening headaches that resulted in difficulty sleeping as well as associated nausea vomiting within the last few days.  Patient also had fatigue at this time.  While patient was worked up in the emergency department labs were relatively unremarkable however CT head did demonstrate new cerebral edema more significant in the temporal occipital and parietal lobes of the right side. Jorge Cruz is also noted to be an underlying cystic mass in which Dr. Jim Kearney, neurosurgery was consulted. Amalia Bess was admitted to the ICU 5/6/2022 for close monitoring.  Upon arrival to the ICU patient was started on Decadron as well as hypertonic saline.  An MRI was obtained. Patient underwent right-sided craniotomy 5/11/2022 by Dr. Jim Kearney, neurosurgeon.  Subsequently that evening patient did develop atrial fibrillation with rapid ventricular response in which amiodarone bolus and drip were initiated and Lopressor 5 mg IV was given.  And at that point patient did convert to normal sinus rhythm. Given resolution of atrial fibrillation and patient being stable patient was okay to be transition to ICU stepdown 5/13/2021. 5/14/22 patient had episode of atrial fibrillation with rapid ventricular response that responded well to Lopressor  Patient had no further episodes of A. fib with rapid ventricular response.  Patient was ambulated by nurse 5/15/2022 however due to unsteady gait we will have physical therapy work with patient 5/15/2022 to ensure safe for discharge. Patient had fluid build up under incision site noted 5/16/22. Serosanguineous fluid was drained 5/16/22, 5/17/22 and 5/18/22. Discussed extensively with Dr. Jim Kearney and will proceed with no further draining and tight compression dressing with discharge home. Dr. Red Ewing, is patient's PCP and was at bedside this morning 5/19/22 and discussed case with Dr. Jim Kearney.  Will discharge home, in accordance with family and patient wishes, with home health, CBC and BMP prior to follow up with PCP in 3-5 days. Physical Exam:-  Vitals:   Patient Vitals for the past 24 hrs:   BP Temp Temp src Pulse Resp SpO2   05/19/22 0805 (!) 174/49 98.9 °F (37.2 °C) Oral 66 18 94 %   05/19/22 0431 136/60 -- -- 69 18 95 %   05/18/22 2353 135/64 98.1 °F (36.7 °C) Oral 69 16 98 %   05/18/22 2047 (!) 124/48 98.2 °F (36.8 °C) Oral 65 18 97 %   05/18/22 1504 (!) 128/55 98.4 °F (36.9 °C) Oral 65 20 96 %   05/18/22 1303 -- -- -- -- -- 94 %   05/18/22 1102 (!) 169/52 98.2 °F (36.8 °C) Oral 68 18 100 %     Weight:   Weight: 151 lb 6.4 oz (68.7 kg)   24 hour intake/output:     Intake/Output Summary (Last 24 hours) at 5/19/2022 0958  Last data filed at 5/18/2022 2050  Gross per 24 hour   Intake 1014.93 ml   Output --   Net 1014.93 ml       1. General appearance: No apparent distress, appears stated age and cooperative. 2. Neck: Supple, with full range of motion. No jugular venous distention. Trachea midline. 3. Respiratory:  Normal respiratory effort. Clear to auscultation, bilaterally without Rales/Wheezes/Rhonchi. 4. Cardiovascular: Regular rate and rhythm with normal S1/S2   5. Abdomen: Soft, non-tender, non-distended with normal bowel sounds. 6. Musculoskeletal:  No clubbing, cyanosis or edema bilaterally. 7. Skin: warm and dry   8. Neurologic:  Neurovascularly intact without any focal sensory/motor deficits. Patient lean incision on right posterior occiput. Mild amount of fluid noted under incision   9. Psychiatric: Alert and oriented, thought content appropriate, normal insight  10. Capillary Refill: Brisk,< 3 seconds   11. Peripheral Pulses: +2 palpable, equal bilaterally       Discharge Medications:-      Medication List      START taking these medications    amiodarone 200 MG tablet  Commonly known as: CORDARONE  Take 1 tablet by mouth daily     HYDROcodone-acetaminophen 5-325 MG per tablet  Commonly known as: NORCO  Take 2 tablets by mouth every 8 hours as needed for Pain for up to 3 days. metoprolol tartrate 25 MG tablet  Commonly known as: LOPRESSOR  Take 0.5 tablets by mouth 2 times daily        CHANGE how you take these medications    predniSONE 1 MG tablet  Commonly known as: DELTASONE  Take 1 tablet by mouth daily - resume after completes decadron  What changed: Another medication with the same name was removed. Continue taking this medication, and follow the directions you see here.         CONTINUE taking these medications    * albuterol sulfate  (90 Base) MCG/ACT inhaler  Inhale 2 puffs into the lungs every 6 hours as needed for Wheezing or Shortness of Breath     * albuterol (2.5 MG/3ML) 0.083% nebulizer solution  Commonly known as: PROVENTIL  Take 3 mLs by nebulization every 6 hours as needed for Wheezing     ALPRAZolam 0.25 MG tablet  Commonly known as: XANAX  take 1 tablet by mouth twice a day if needed for anxiety     Centrum Silver Adult 50+ Tabs  Take 1 tablet by mouth daily     Compressor/Nebulizer Misc  1 Device by Does not apply route 4 times daily as needed (Shortness of breath and wheezing)     docusate 100 MG Caps  Commonly known as: COLACE, DULCOLAX  Take 100 mg by mouth 2 times daily     Ensure Original Liqd  Take 1 Can by mouth 2 times daily     isosorbide mononitrate 30 MG extended release tablet  Commonly known as: IMDUR  take 1 tablet by mouth once daily     levETIRAcetam 500 MG tablet  Commonly known as: KEPPRA  take 1 tablet by mouth twice a day     levothyroxine 100 MCG tablet  Commonly known as: SYNTHROID  take 1 tablet by mouth once daily     lidocaine 4 % external patch  Apply 2 patches topically daily     loratadine 10 MG tablet  Commonly known as: CLARITIN  take 1 tablet by mouth once daily     melatonin 3 MG Tabs tablet  Take 1 tablet by mouth nightly as needed (insomnia)     ondansetron 4 MG disintegrating tablet  Commonly known as: ZOFRAN-ODT     Oyster Shell Calcium/D 500-200 MG-UNIT Tabs  take 1 tablet by mouth twice a day     pantoprazole 40 MG tablet  Commonly known as: PROTONIX  take 1 tablet by mouth once daily     polyethylene glycol 17 GM/SCOOP powder  Commonly known as: GLYCOLAX  Take 17 g by mouth daily as needed (constipation)     promethazine 25 MG tablet  Commonly known as: PHENERGAN  Take 1 tablet by mouth every 6 hours as needed for Nausea (and vomiting)     RA Biotin 1000 MCG Tabs  Generic drug: Biotin  Take 1,000 mcg by mouth daily Patient taking it twice a day     Refresh Plus 0.5 % Soln ophthalmic solution  Generic drug: carboxymethylcellulose PF     sertraline 50 MG tablet  Commonly known as: ZOLOFT  take 1 tablet by mouth twice a day     simvastatin 20 MG tablet  Commonly known as: ZOCOR  take 1 tablet by mouth every evening     traZODone 50 MG tablet  Commonly known as: DESYREL  take 1 tablet by mouth at bedtime if needed for sleep     vitamin D 1.25 MG (26936 UT) Caps capsule  Commonly known as: ERGOCALCIFEROL     zinc sulfate 220 (50 Zn) MG capsule  Commonly known as: ZINCATE  Take 1 capsule by mouth daily         * This list has 2 medication(s) that are the same as other medications prescribed for you. Read the directions carefully, and ask your doctor or other care provider to review them with you.             STOP taking these medications    ciprofloxacin 500 MG tablet  Commonly known as: CIPRO     RA Aspirin EC Adult Low St 81 MG EC tablet  Generic drug: aspirin     RA P Col-Rite 8.6-50 MG per tablet  Generic drug: senna-docusate           Where to Get Your Medications      These medications were sent to [address], [address]  [address] [place] [address], [address] [place] [address] [facility]    Phone: [phone]   · amiodarone 200 MG tablet  · metoprolol tartrate 25 MG tablet     You can get these medications from any pharmacy    Bring a paper prescription for each of these medications  · HYDROcodone-acetaminophen 5-325 MG per tablet          Labs :-  Recent Results (from the past 72 hour(s))   Hemoglobin and Hematocrit    Collection Time: 05/16/22 10:20 AM   Result Value Ref Range    Hemoglobin 10.5 (L) 12.0 - 16.0 gm/dl    Hematocrit 33.0 (L) 37.0 - 47.0 %   POCT glucose    Collection Time: 05/16/22 10:38 AM   Result Value Ref Range    POC Glucose 93 70 - 108 mg/dl   POCT glucose    Collection Time: 05/16/22  3:19 PM   Result Value Ref Range    POC Glucose 92 70 - 108 mg/dl   POCT glucose    Collection Time: 05/16/22  7:43 PM   Result Value Ref Range    POC Glucose 154 (H) 70 - 108 mg/dl   POCT glucose    Collection Time: 05/16/22  8:19 PM   Result Value Ref Range    POC Glucose 149 (H) 70 - 108 mg/dl   Basic Metabolic Panel    Collection Time: 05/17/22  3:15 AM   Result Value Ref Range    Sodium 134 (L) 135 - 145 meq/L    Potassium 4.4 3.5 - 5.2 meq/L    Chloride 97 (L) 98 - 111 meq/L    CO2 31 23 - 33 meq/L    Glucose 107 70 - 108 mg/dL    BUN 21 7 - 22 mg/dL    CREATININE 0.6 0.4 - 1.2 mg/dL    Calcium 8.7 8.5 - 10.5 mg/dL   Anion Gap    Collection Time: 05/17/22  3:15 AM   Result Value Ref Range    Anion Gap 6.0 (L) 8.0 - 16.0 meq/L   Glomerular Filtration Rate, Estimated    Collection Time: 05/17/22  3:15 AM   Result Value Ref Range    Est, Glom Filt Rate >90 ml/min/1.73m2   CBC with Auto Differential    Collection Time: 05/17/22  6:40 AM   Result Value Ref Range    WBC 7.2 4.8 - 10.8 thou/mm3    RBC 3.62 (L) 4.20 - 5.40 mill/mm3    Hemoglobin 10.2 (L) 12.0 - 16.0 gm/dl    Hematocrit 34.1 (L) 37.0 - 47.0 %    MCV 94.2 81.0 - 99.0 fL    MCH 28.2 26.0 - 33.0 pg    MCHC 29.9 (L) 32.2 - 35.5 gm/dl    RDW-CV 14.4 11.5 - 14.5 %    RDW-SD 50.4 (H) 35.0 - 45.0 fL    Platelets 861 (L) 033 - 400 thou/mm3    MPV 12.2 9.4 - 12.4 fL    Seg Neutrophils 67.3 %    Lymphocytes 19.6 %    Monocytes 10.6 %    Eosinophils 1.7 %    Basophils 0.1 %    Immature Granulocytes 0.7 %    Segs Absolute 4.8 1.8 - 7.7 thou/mm3    Lymphocytes Absolute 1.4 1.0 - 4.8 thou/mm3    Monocytes Absolute 0.8 0.4 - 1.3 thou/mm3    Eosinophils Absolute 0.1 0.0 - 0.4 thou/mm3    Basophils Absolute 0.0 0.0 - 0.1 thou/mm3    Immature Grans (Abs) 0.05 0.00 - 0.07 thou/mm3    nRBC 0 /100 wbc   POCT glucose    Collection Time: 05/17/22 10:12 AM   Result Value Ref Range    POC Glucose 101 70 - 108 mg/dl   POCT glucose    Collection Time: 05/17/22  3:58 PM   Result Value Ref Range    POC Glucose 158 (H) 70 - 108 mg/dl   POCT glucose    Collection Time: 05/17/22  7:29 PM   Result Value Ref Range    POC Glucose 170 (H) 70 - 108 mg/dl   CBC with Auto Differential    Collection Time: 05/18/22  3:30 AM   Result Value Ref Range    WBC 7.1 4.8 - 10.8 thou/mm3    RBC 3.32 (L) 4.20 - 5.40 mill/mm3    Hemoglobin 9.4 (L) 12.0 - 16.0 gm/dl    Hematocrit 31.2 (L) 37.0 - 47.0 %    MCV 94.0 81.0 - 99.0 fL    MCH 28.3 26.0 - 33.0 pg    MCHC 30.1 (L) 32.2 - 35.5 gm/dl    RDW-CV 14.5 11.5 - 14.5 %    RDW-SD 49.4 (H) 35.0 - 45.0 fL    Platelets 96 (L) 697 - 400 thou/mm3    MPV 12.1 9.4 - 12.4 fL    Seg Neutrophils 66.1 %    Lymphocytes 21.2 %    Monocytes 10.6 %    Eosinophils 1.3 %    Basophils 0.1 %    Immature Granulocytes 0.7 %    Segs Absolute 4.7 1.8 - 7.7 thou/mm3    Lymphocytes Absolute 1.5 1.0 - 4.8 thou/mm3    Monocytes Absolute 0.8 0.4 - 1.3 thou/mm3    Eosinophils Absolute 0.1 0.0 - 0.4 thou/mm3    Basophils Absolute 0.0 0.0 - 0.1 thou/mm3    Immature Grans (Abs) 0.05 0.00 - 0.07 thou/mm3    nRBC 0 /100 wbc   Basic Metabolic Panel    Collection Time: 05/18/22  3:30 AM   Result Value Ref Range    Sodium 137 135 - 145 meq/L    Potassium 4.1 3.5 - 5.2 meq/L    Chloride 101 98 - 111 meq/L    CO2 29 23 - 33 meq/L    Glucose 92 70 - 108 mg/dL    BUN 21 7 - 22 mg/dL    CREATININE 0.7 0.4 - 1.2 mg/dL    Calcium 8.2 (L) 8.5 - 10.5 mg/dL   Anion Gap    Collection Time: 05/18/22  3:30 AM   Result Value Ref Range    Anion Gap 7.0 (L) 8.0 - 16.0 meq/L   Glomerular Filtration Rate, Estimated    Collection Time: 05/18/22  3:30 AM   Result Value Ref Range Est, Glom Filt Rate 79 (A) ml/min/1.73m2   POCT glucose    Collection Time: 05/18/22  6:21 AM   Result Value Ref Range    POC Glucose 88 70 - 108 mg/dl   POCT glucose    Collection Time: 05/18/22 10:28 AM   Result Value Ref Range    POC Glucose 87 70 - 108 mg/dl   POCT glucose    Collection Time: 05/18/22  3:05 PM   Result Value Ref Range    POC Glucose 115 (H) 70 - 108 mg/dl   POCT glucose    Collection Time: 05/18/22  7:55 PM   Result Value Ref Range    POC Glucose 132 (H) 70 - 108 mg/dl   Basic Metabolic Panel    Collection Time: 05/19/22  4:00 AM   Result Value Ref Range    Sodium 139 135 - 145 meq/L    Potassium 4.4 3.5 - 5.2 meq/L    Chloride 104 98 - 111 meq/L    CO2 28 23 - 33 meq/L    Glucose 94 70 - 108 mg/dL    BUN 14 7 - 22 mg/dL    CREATININE 0.5 0.4 - 1.2 mg/dL    Calcium 8.2 (L) 8.5 - 10.5 mg/dL   CBC with Auto Differential    Collection Time: 05/19/22  4:00 AM   Result Value Ref Range    WBC 6.1 4.8 - 10.8 thou/mm3    RBC 3.10 (L) 4.20 - 5.40 mill/mm3    Hemoglobin 8.8 (L) 12.0 - 16.0 gm/dl    Hematocrit 29.2 (L) 37.0 - 47.0 %    MCV 94.2 81.0 - 99.0 fL    MCH 28.4 26.0 - 33.0 pg    MCHC 30.1 (L) 32.2 - 35.5 gm/dl    RDW-CV 14.3 11.5 - 14.5 %    RDW-SD 49.0 (H) 35.0 - 45.0 fL    Platelets 95 (L) 339 - 400 thou/mm3    MPV 12.2 9.4 - 12.4 fL    Seg Neutrophils 65.3 %    Lymphocytes 21.5 %    Monocytes 10.8 %    Eosinophils 1.6 %    Basophils 0.0 %    Immature Granulocytes 0.8 %    Segs Absolute 4.0 1.8 - 7.7 thou/mm3    Lymphocytes Absolute 1.3 1.0 - 4.8 thou/mm3    Monocytes Absolute 0.7 0.4 - 1.3 thou/mm3    Eosinophils Absolute 0.1 0.0 - 0.4 thou/mm3    Basophils Absolute 0.0 0.0 - 0.1 thou/mm3    Immature Grans (Abs) 0.05 0.00 - 0.07 thou/mm3    nRBC 0 /100 wbc   Anion Gap    Collection Time: 05/19/22  4:00 AM   Result Value Ref Range    Anion Gap 7.0 (L) 8.0 - 16.0 meq/L   Glomerular Filtration Rate, Estimated    Collection Time: 05/19/22  4:00 AM   Result Value Ref Range    Est, Glom Filt Rate >90 ml/min/1.73m2   POCT glucose    Collection Time: 05/19/22  6:37 AM   Result Value Ref Range    POC Glucose 95 70 - 108 mg/dl        Microbiology:    Blood culture #1:   Lab Results   Component Value Date    BC No growth-preliminary No growth  01/21/2022       Blood culture #2:No results found for: Lorna Sanders    Organism:    Lab Results   Component Value Date    LABGRAM  12/06/2016     No segmented neutrophils observed. Rare epithelial cells observed. No organisms observed. MRSA culture only:No results found for: [facility]    Urine culture:   Lab Results   Component Value Date    LABURIN [place] count: >100,000 CFU/mL  05/07/2022     Lab Results   Component Value Date    ORG Escherichia coli 05/07/2022        Respiratory culture: No results found for: CULTRESP    Aerobic and Anaerobic :  Lab Results   Component Value Date    LABAERO  12/06/2016     No growth-preliminary  Culture yielded very light growth of Staphylococcus sp  (coagulase negative) and gram positive bacilli consistent  with Corynebacterium sp. Culture results suggestive of  surface kate when viewed in conjunction with direct gram  stain results. Lab Results   Component Value Date    LABANAE  11/04/2016     No anaerobes isolated- preliminary  No anaerobes isolated         Urinalysis:      Lab Results   Component Value Date    NITRU NEGATIVE 05/07/2022    WBCUA 0-2 05/07/2022    BACTERIA NONE SEEN 05/07/2022    RBCUA 0-2 05/07/2022    BLOODU NEGATIVE 05/07/2022    SPECGRAV 1.011 10/29/2021    GLUCOSEU NEGATIVE 05/07/2022       Radiology:-  CT HEAD WO CONTRAST    Result Date: 5/10/2022  PROCEDURE: CT HEAD WO CONTRAST CLINICAL INFORMATION: fr surgery planning. COMPARISON: MRI brain dated 5/6/2022. TECHNIQUE: Helical CT of the head with financial marker utilizing [place] protocol with axial, sagittal and coronal reconstructions.  All CT scans at this facility use dose modulation, iterative reconstruction, and/or weight-based dosing when appropriate to reduce radiation dose to as low as reasonably achievable. FINDINGS: Redemonstration of a left parietal craniotomy with underlying resection cavity. There is an irregular complex multilobulated cystic lesion involving the resection cavity, better characterized on prior MRI. Hypodensity surrounding the lesion corresponds to expansile T2/FLAIR signal seen on previous MRI. There is stable mass effect on the right occipital horn. No midline shift is identified. Ventricular size is stable. 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 Orbits are otherwise unremarkable. Paranasal sinuses and mastoid air cells are clear. Raymond CT of the head for surgical planning redemonstrating right parietal craniotomy with complex cystic mass subjacent involving the prior resection cavity with adjacent mild expansile hypodensity, better characterized on previous MRI and causing stable mild mass effect on the occipital horn of the right lateral ventricle. **This report has been created using voice recognition software. It may contain minor errors which are inherent in voice recognition technology. ** Final report electronically signed by Dr. Roxane Sauceda MD on 5/10/2022 10:35 AM       Follow-up scheduled after discharge :-    in 3-5 days with Kenzie Garcia MD  Follow up with neurosurgery NP in clinic in for suture removal (5/25/22).      Consultations during this hospital stay:-  [] NONE [] Cardiology  [] Nephrology  [] Hemo onco   [] GI   [] ID  [] Endocrine  [] Pulm    [] Neuro    [] Psych   [] Urology  [] ENT   [] G SURGERY   []Ortho    []CV surg    [] Palliative  [] Hospice [] Pain management   []    []TCU   [x] PT/OT  OTHERS:-Neurosurgery     Disposition: home  Condition at Discharge: Stable    Time Spent:- 50 minutes    Electronically signed by Devan Morgan DO on 5/19/2022 at 9:58 AM  Discharging Hospitalist

## 2022-05-19 NOTE — PROGRESS NOTES
Discharge teaching and instructions for diagnosis/procedure of Brain Mass completed with patient using teachback method. AVS reviewed. Printed prescriptions given to patient. Patient voiced understanding regarding prescriptions, follow up appointments, and care of self at home. Discharged in a wheelchair to home with support per family.

## 2022-05-19 NOTE — PROGRESS NOTES
Order received for CVC removal per order . Patient placed in bed. Transparent dressing removed. Skin accessed appears clean and dry. Sutures removed, area cleaned with chloro prep, bio patch applied, sterile gauze placed over bio patch, CVC removed with green tip intact, pressure held with sterile gauze for four minutes. New transparent dressing applied. Educated the patient on remaining in bed for one hour, dressing stays on for 24 hours, signs and symptoms of infection and notify primary nurse if any blood or oozing. Melinda CONTEH notified.

## 2022-05-19 NOTE — PLAN OF CARE
Problem: Discharge Planning  Goal: Discharge to home or other facility with appropriate resources  Description: Discharge planning in progress. 5/19/2022 0828 by Abigail Brennan RN  Outcome: Progressing  Flowsheets (Taken 5/19/2022 0805)  Discharge to home or other facility with appropriate resources: Identify barriers to discharge with patient and caregiver  Note: Patient educated that discharge plan is still in progress. Problem: Pain  Goal: Verbalizes/displays adequate comfort level or baseline comfort level  Description: PRN Norco for pain management. Encouraged to report pain on pain scale. 5/19/2022 0828 by Abigail Brennan RN  Outcome: Progressing  Flowsheets (Taken 5/19/2022 6023)  Verbalizes/displays adequate comfort level or baseline comfort level: Encourage patient to monitor pain and request assistance  Note: Patient reports pain as a 10 on a 0-10 scale this shift. Patient's stated pain goal is no pain. Pain is acute/surgical and located in the head/neck described as aching/burning. Non-pharmacological pain interventions include rest and repositioning. Pharmacological pain interventions on board per physician's order. Problem: Safety - Adult  Goal: Free from fall injury  Description: Family at bedside. Bed alarm activated. 5/19/2022 0828 by Abigail Brennan RN  Outcome: Progressing  Flowsheets (Taken 5/19/2022 0828)  Free From Fall Injury: Instruct family/caregiver on patient safety  Note: Patient remains free from falls this shift. Fall precautions in place with bed/chair exit alarmed. Fall sign posted and fall armband in place. Nonskid footwear used with transferring. Educated patient to use call light when in need of staff assistance with transferring, ambulating, and other activities of daily living. Patient appropriately uses call light this shift.         Problem: Chronic Conditions and Co-morbidities  Goal: Patient's chronic conditions and co-morbidity symptoms are monitored and maintained or improved  Description: Monitoring co-morbidities. 5/19/2022 0828 by Nasra Bullard RN  Outcome: Progressing  Flowsheets (Taken 5/19/2022 0805)  Care Plan - Patient's Chronic Conditions and Co-Morbidity Symptoms are Monitored and Maintained or Improved: Monitor and assess patient's chronic conditions and comorbid symptoms for stability, deterioration, or improvement     Problem: Nutrition Deficit:  Goal: Optimize nutritional status  Problem: ABCDS Injury Assessment  Goal: Absence of physical injury  Description: No physical injury noted. 5/19/2022 0828 by Nasra Bullard RN  Outcome: Progressing     Problem: Skin/Tissue Integrity  Goal: Absence of new skin breakdown  Description: 1. Monitor for areas of redness and/or skin breakdown  2. Assess vascular access sites hourly  3. Every 4-6 hours minimum:  Change oxygen saturation probe site  4. Every 4-6 hours:  If on nasal continuous positive airway pressure, respiratory therapy assess nares and determine need for appliance change or resting period. 5/19/2022 0828 by Nasra Bullard RN  Outcome: Progressing  Note: Patient exhibits no new skin breakdown this shift. Patient repositined Q2H and as needed with staff assistance. All skin integrity issuse charted in Flowsheets. Will continue to monitor. Care plan reviewed with patient and family. Patient and family verbalize understanding of the plan of care and contribute to goal setting.

## 2022-05-20 ENCOUNTER — CARE COORDINATION (OUTPATIENT)
Dept: CASE MANAGEMENT | Age: 87
End: 2022-05-20

## 2022-05-20 NOTE — CARE COORDINATION
Care Transitions Initial Outreach Attempt-1st attempt    Call within 2 business days of discharge: Yes   Attempted to reach patient for transitions of care follow up. Unable to reach patient. No answer on land line, no voice mail. Brother, Amy Brothers, not accepting calls at this time. Called Dr Milo Salinas office d/t confusing appt time for suture/staple removal.  Appt confirmed for  and office was able to reach [facility] and gave her the appt day/time and said she would be sure to let Latanya know. Confirmed HH referral with Demario Padilla at [facility]. Contacted [facility] and asked if she reaches Casey and/or Aida Vasquez to have them call me, she has my number, said she will do so. Patient: Matthew Haines Patient : 1933 MRN: [id number]    Last Discharge [facility]       Complaint Diagnosis Description Type Department Provider    22 Abdominal Pain Brain metastases ([facility] [place] [address] . .. ED to Hosp-Admission (Discharged) (ADMITTED) JOSE MIGUEL 4A Rickey Kim MD; Suzanne Martinez ...             Noted following upcoming appointments from discharge chart review:   [facility] follow up appointment(s):   Future Appointments   Date Time Provider Irene Shah   2022  4:00 PM Adriana Go 139, [address] [facility]   2022  2:20 PM Leyla Cartrwight MD [facility]   6/3/2022 12:30 PM STR MRI RM1 STR[facility] MRI STR [facility]   2022  2:00 PM Adriana Go 139, APRN - [phone] [address] [facility] follow up appointment(s): na

## 2022-05-23 ENCOUNTER — APPOINTMENT (OUTPATIENT)
Dept: CT IMAGING | Age: 87
DRG: 641 | End: 2022-05-23
Payer: MEDICARE

## 2022-05-23 ENCOUNTER — HOSPITAL ENCOUNTER (INPATIENT)
Age: 87
LOS: 3 days | Discharge: NURSING, REHAB OR LONG-TERM CARE | DRG: 641 | End: 2022-05-28
Attending: EMERGENCY MEDICINE | Admitting: EMERGENCY MEDICINE
Payer: MEDICARE

## 2022-05-23 ENCOUNTER — TELEPHONE (OUTPATIENT)
Dept: NEUROSURGERY | Age: 87
End: 2022-05-23

## 2022-05-23 ENCOUNTER — CARE COORDINATION (OUTPATIENT)
Dept: CASE MANAGEMENT | Age: 87
End: 2022-05-23

## 2022-05-23 DIAGNOSIS — R41.82 ALTERED MENTAL STATUS, UNSPECIFIED ALTERED MENTAL STATUS TYPE: ICD-10-CM

## 2022-05-23 DIAGNOSIS — R51.9 INTRACTABLE HEADACHE, UNSPECIFIED CHRONICITY PATTERN, UNSPECIFIED HEADACHE TYPE: Primary | ICD-10-CM

## 2022-05-23 PROBLEM — R53.1 GENERALIZED WEAKNESS: Status: RESOLVED | Noted: 2021-10-29 | Resolved: 2022-01-01

## 2022-05-23 PROBLEM — R41.0 CONFUSION: Status: ACTIVE | Noted: 2022-01-01

## 2022-05-23 LAB
ALBUMIN SERPL-MCNC: 3.7 G/DL (ref 3.5–5.1)
ALP BLD-CCNC: 42 U/L (ref 38–126)
ALT SERPL-CCNC: 63 U/L (ref 11–66)
ANION GAP SERPL CALCULATED.3IONS-SCNC: 8 MEQ/L (ref 8–16)
AST SERPL-CCNC: 85 U/L (ref 5–40)
BACTERIA: ABNORMAL /HPF
BASOPHILS # BLD: 0.2 %
BASOPHILS ABSOLUTE: 0 THOU/MM3 (ref 0–0.1)
BILIRUB SERPL-MCNC: 0.4 MG/DL (ref 0.3–1.2)
BILIRUBIN DIRECT: < 0.2 MG/DL (ref 0–0.3)
BILIRUBIN URINE: NEGATIVE
BLOOD, URINE: ABNORMAL
BUN BLDV-MCNC: 17 MG/DL (ref 7–22)
CALCIUM SERPL-MCNC: 8.4 MG/DL (ref 8.5–10.5)
CASTS 2: ABNORMAL /LPF
CASTS UA: ABNORMAL /LPF
CHARACTER, URINE: CLEAR
CHLORIDE BLD-SCNC: 88 MEQ/L (ref 98–111)
CO2: 28 MEQ/L (ref 23–33)
COLOR: YELLOW
CREAT SERPL-MCNC: 0.5 MG/DL (ref 0.4–1.2)
CRYSTALS, UA: ABNORMAL
EKG Q-T INTERVAL: 380 MS
EKG QRS DURATION: 82 MS
EKG QTC CALCULATION (BAZETT): 433 MS
EKG R AXIS: 9 DEGREES
EKG T AXIS: 130 DEGREES
EKG VENTRICULAR RATE: 78 BPM
EOSINOPHIL # BLD: 0.9 %
EOSINOPHILS ABSOLUTE: 0.1 THOU/MM3 (ref 0–0.4)
EPITHELIAL CELLS, UA: ABNORMAL /HPF
ERYTHROCYTE [DISTWIDTH] IN BLOOD BY AUTOMATED COUNT: 13.7 % (ref 11.5–14.5)
ERYTHROCYTE [DISTWIDTH] IN BLOOD BY AUTOMATED COUNT: 44.8 FL (ref 35–45)
FOLATE: > 20 NG/ML (ref 4.8–24.2)
GFR SERPL CREATININE-BSD FRML MDRD: > 90 ML/MIN/1.73M2
GLUCOSE BLD-MCNC: 109 MG/DL (ref 70–108)
GLUCOSE URINE: NEGATIVE MG/DL
HCT VFR BLD CALC: 34.6 % (ref 37–47)
HEMOGLOBIN: 10.9 GM/DL (ref 12–16)
IMMATURE GRANS (ABS): 0.07 THOU/MM3 (ref 0–0.07)
IMMATURE GRANULOCYTES: 0.7 %
KETONES, URINE: NEGATIVE
LEUKOCYTE ESTERASE, URINE: NEGATIVE
LYMPHOCYTES # BLD: 18.8 %
LYMPHOCYTES ABSOLUTE: 1.8 THOU/MM3 (ref 1–4.8)
MCH RBC QN AUTO: 28.2 PG (ref 26–33)
MCHC RBC AUTO-ENTMCNC: 31.5 GM/DL (ref 32.2–35.5)
MCV RBC AUTO: 89.6 FL (ref 81–99)
MISCELLANEOUS 2: ABNORMAL
MONOCYTES # BLD: 11 %
MONOCYTES ABSOLUTE: 1.1 THOU/MM3 (ref 0.4–1.3)
NITRITE, URINE: NEGATIVE
NUCLEATED RED BLOOD CELLS: 0 /100 WBC
OSMOLALITY CALCULATION: 251.8 MOSMOL/KG (ref 275–300)
PH UA: 6.5 (ref 5–9)
PLATELET # BLD: 171 THOU/MM3 (ref 130–400)
PMV BLD AUTO: 11.4 FL (ref 9.4–12.4)
POTASSIUM REFLEX MAGNESIUM: 4.1 MEQ/L (ref 3.5–5.2)
PROTEIN UA: ABNORMAL
RBC # BLD: 3.86 MILL/MM3 (ref 4.2–5.4)
RBC URINE: ABNORMAL /HPF
REASON FOR REJECTION: NORMAL
REJECTED TEST: NORMAL
RENAL EPITHELIAL, UA: ABNORMAL
SEG NEUTROPHILS: 68.4 %
SEGMENTED NEUTROPHILS ABSOLUTE COUNT: 6.7 THOU/MM3 (ref 1.8–7.7)
SODIUM BLD-SCNC: 124 MEQ/L (ref 135–145)
SPECIFIC GRAVITY, URINE: 1.02 (ref 1–1.03)
TOTAL PROTEIN: 5.6 G/DL (ref 6.1–8)
TSH SERPL DL<=0.05 MIU/L-ACNC: 3.2 UIU/ML (ref 0.4–4.2)
UROBILINOGEN, URINE: 1 EU/DL (ref 0–1)
VITAMIN B-12: 494 PG/ML (ref 211–911)
WBC # BLD: 9.8 THOU/MM3 (ref 4.8–10.8)
WBC UA: ABNORMAL /HPF
YEAST: ABNORMAL

## 2022-05-23 PROCEDURE — 80076 HEPATIC FUNCTION PANEL: CPT

## 2022-05-23 PROCEDURE — G0378 HOSPITAL OBSERVATION PER HR: HCPCS

## 2022-05-23 PROCEDURE — 93010 ELECTROCARDIOGRAM REPORT: CPT | Performed by: INTERNAL MEDICINE

## 2022-05-23 PROCEDURE — 81001 URINALYSIS AUTO W/SCOPE: CPT

## 2022-05-23 PROCEDURE — 93005 ELECTROCARDIOGRAM TRACING: CPT | Performed by: EMERGENCY MEDICINE

## 2022-05-23 PROCEDURE — 85025 COMPLETE CBC W/AUTO DIFF WBC: CPT

## 2022-05-23 PROCEDURE — 2580000003 HC RX 258: Performed by: EMERGENCY MEDICINE

## 2022-05-23 PROCEDURE — 99212 OFFICE O/P EST SF 10 MIN: CPT | Performed by: NEUROLOGICAL SURGERY

## 2022-05-23 PROCEDURE — 82746 ASSAY OF FOLIC ACID SERUM: CPT

## 2022-05-23 PROCEDURE — 96361 HYDRATE IV INFUSION ADD-ON: CPT

## 2022-05-23 PROCEDURE — 6370000000 HC RX 637 (ALT 250 FOR IP): Performed by: EMERGENCY MEDICINE

## 2022-05-23 PROCEDURE — 84443 ASSAY THYROID STIM HORMONE: CPT

## 2022-05-23 PROCEDURE — 99285 EMERGENCY DEPT VISIT HI MDM: CPT

## 2022-05-23 PROCEDURE — 80048 BASIC METABOLIC PNL TOTAL CA: CPT

## 2022-05-23 PROCEDURE — 6360000002 HC RX W HCPCS: Performed by: STUDENT IN AN ORGANIZED HEALTH CARE EDUCATION/TRAINING PROGRAM

## 2022-05-23 PROCEDURE — 82607 VITAMIN B-12: CPT

## 2022-05-23 PROCEDURE — 80177 DRUG SCRN QUAN LEVETIRACETAM: CPT

## 2022-05-23 PROCEDURE — 36415 COLL VENOUS BLD VENIPUNCTURE: CPT

## 2022-05-23 PROCEDURE — 2580000003 HC RX 258: Performed by: STUDENT IN AN ORGANIZED HEALTH CARE EDUCATION/TRAINING PROGRAM

## 2022-05-23 PROCEDURE — 96374 THER/PROPH/DIAG INJ IV PUSH: CPT

## 2022-05-23 PROCEDURE — 70450 CT HEAD/BRAIN W/O DYE: CPT

## 2022-05-23 RX ORDER — ISOSORBIDE MONONITRATE 30 MG/1
30 TABLET, EXTENDED RELEASE ORAL DAILY
Status: DISCONTINUED | OUTPATIENT
Start: 2022-05-23 | End: 2022-05-28 | Stop reason: HOSPADM

## 2022-05-23 RX ORDER — ACETAMINOPHEN 650 MG/1
650 SUPPOSITORY RECTAL EVERY 6 HOURS PRN
Status: DISCONTINUED | OUTPATIENT
Start: 2022-05-23 | End: 2022-05-28 | Stop reason: HOSPADM

## 2022-05-23 RX ORDER — ALBUTEROL SULFATE 90 UG/1
2 AEROSOL, METERED RESPIRATORY (INHALATION) EVERY 6 HOURS PRN
Status: DISCONTINUED | OUTPATIENT
Start: 2022-05-23 | End: 2022-05-28 | Stop reason: HOSPADM

## 2022-05-23 RX ORDER — TRAZODONE HYDROCHLORIDE 50 MG/1
50 TABLET ORAL NIGHTLY
Status: DISCONTINUED | OUTPATIENT
Start: 2022-05-23 | End: 2022-05-28 | Stop reason: HOSPADM

## 2022-05-23 RX ORDER — SODIUM CHLORIDE 0.9 % (FLUSH) 0.9 %
5-40 SYRINGE (ML) INJECTION PRN
Status: DISCONTINUED | OUTPATIENT
Start: 2022-05-23 | End: 2022-05-28 | Stop reason: HOSPADM

## 2022-05-23 RX ORDER — LEVETIRACETAM 500 MG/1
500 TABLET ORAL 2 TIMES DAILY
Status: DISCONTINUED | OUTPATIENT
Start: 2022-05-23 | End: 2022-05-28 | Stop reason: HOSPADM

## 2022-05-23 RX ORDER — MULTIVITAMIN WITH IRON
1 TABLET ORAL DAILY
Status: DISCONTINUED | OUTPATIENT
Start: 2022-05-23 | End: 2022-05-28 | Stop reason: HOSPADM

## 2022-05-23 RX ORDER — POLYETHYLENE GLYCOL 3350 17 G/17G
17 POWDER, FOR SOLUTION ORAL DAILY PRN
Status: DISCONTINUED | OUTPATIENT
Start: 2022-05-23 | End: 2022-05-28 | Stop reason: HOSPADM

## 2022-05-23 RX ORDER — SODIUM CHLORIDE 0.9 % (FLUSH) 0.9 %
5-40 SYRINGE (ML) INJECTION EVERY 12 HOURS SCHEDULED
Status: DISCONTINUED | OUTPATIENT
Start: 2022-05-23 | End: 2022-05-28 | Stop reason: HOSPADM

## 2022-05-23 RX ORDER — HYDROCODONE BITARTRATE AND ACETAMINOPHEN 7.5; 325 MG/1; MG/1
1 TABLET ORAL EVERY 6 HOURS PRN
Status: DISCONTINUED | OUTPATIENT
Start: 2022-05-23 | End: 2022-05-28 | Stop reason: HOSPADM

## 2022-05-23 RX ORDER — SODIUM CHLORIDE 9 MG/ML
INJECTION, SOLUTION INTRAVENOUS PRN
Status: DISCONTINUED | OUTPATIENT
Start: 2022-05-23 | End: 2022-05-28 | Stop reason: HOSPADM

## 2022-05-23 RX ORDER — PREDNISONE 1 MG/1
1 TABLET ORAL DAILY
Status: DISCONTINUED | OUTPATIENT
Start: 2022-05-23 | End: 2022-05-28 | Stop reason: HOSPADM

## 2022-05-23 RX ORDER — ONDANSETRON 2 MG/ML
4 INJECTION INTRAMUSCULAR; INTRAVENOUS EVERY 6 HOURS PRN
Status: DISCONTINUED | OUTPATIENT
Start: 2022-05-23 | End: 2022-05-28 | Stop reason: HOSPADM

## 2022-05-23 RX ORDER — PANTOPRAZOLE SODIUM 40 MG/1
40 TABLET, DELAYED RELEASE ORAL
Status: DISCONTINUED | OUTPATIENT
Start: 2022-05-24 | End: 2022-05-28 | Stop reason: HOSPADM

## 2022-05-23 RX ORDER — 0.9 % SODIUM CHLORIDE 0.9 %
1000 INTRAVENOUS SOLUTION INTRAVENOUS ONCE
Status: COMPLETED | OUTPATIENT
Start: 2022-05-23 | End: 2022-05-23

## 2022-05-23 RX ORDER — SODIUM CHLORIDE 9 MG/ML
INJECTION, SOLUTION INTRAVENOUS CONTINUOUS
Status: DISCONTINUED | OUTPATIENT
Start: 2022-05-23 | End: 2022-05-25

## 2022-05-23 RX ORDER — AMIODARONE HYDROCHLORIDE 200 MG/1
200 TABLET ORAL DAILY
Status: DISCONTINUED | OUTPATIENT
Start: 2022-05-23 | End: 2022-05-28 | Stop reason: HOSPADM

## 2022-05-23 RX ORDER — CETIRIZINE HYDROCHLORIDE 10 MG/1
10 TABLET ORAL DAILY
Status: DISCONTINUED | OUTPATIENT
Start: 2022-05-23 | End: 2022-05-28 | Stop reason: HOSPADM

## 2022-05-23 RX ORDER — ONDANSETRON 4 MG/1
4 TABLET, ORALLY DISINTEGRATING ORAL EVERY 8 HOURS PRN
Status: DISCONTINUED | OUTPATIENT
Start: 2022-05-23 | End: 2022-05-28 | Stop reason: HOSPADM

## 2022-05-23 RX ORDER — LEVOTHYROXINE SODIUM 0.1 MG/1
100 TABLET ORAL DAILY
Status: DISCONTINUED | OUTPATIENT
Start: 2022-05-23 | End: 2022-05-28 | Stop reason: HOSPADM

## 2022-05-23 RX ORDER — ACETAMINOPHEN 325 MG/1
650 TABLET ORAL EVERY 6 HOURS PRN
Status: DISCONTINUED | OUTPATIENT
Start: 2022-05-23 | End: 2022-05-28 | Stop reason: HOSPADM

## 2022-05-23 RX ORDER — PROCHLORPERAZINE EDISYLATE 5 MG/ML
10 INJECTION INTRAMUSCULAR; INTRAVENOUS ONCE
Status: COMPLETED | OUTPATIENT
Start: 2022-05-23 | End: 2022-05-23

## 2022-05-23 RX ADMIN — SODIUM CHLORIDE 1000 ML: 9 INJECTION, SOLUTION INTRAVENOUS at 13:10

## 2022-05-23 RX ADMIN — SODIUM CHLORIDE: 9 INJECTION, SOLUTION INTRAVENOUS at 18:45

## 2022-05-23 RX ADMIN — PROCHLORPERAZINE EDISYLATE 10 MG: 5 INJECTION INTRAMUSCULAR; INTRAVENOUS at 13:11

## 2022-05-23 RX ADMIN — LEVETIRACETAM 500 MG: 500 TABLET, FILM COATED ORAL at 21:03

## 2022-05-23 RX ADMIN — MUPIROCIN: 20 OINTMENT TOPICAL at 18:48

## 2022-05-23 RX ADMIN — ACETAMINOPHEN 650 MG: 325 TABLET ORAL at 21:03

## 2022-05-23 RX ADMIN — METOPROLOL TARTRATE 12.5 MG: 25 TABLET, FILM COATED ORAL at 21:03

## 2022-05-23 RX ADMIN — TRAZODONE HYDROCHLORIDE 50 MG: 50 TABLET ORAL at 21:03

## 2022-05-23 RX ADMIN — HYDROCODONE BITARTRATE AND ACETAMINOPHEN 1 TABLET: 7.5; 325 TABLET ORAL at 23:50

## 2022-05-23 ASSESSMENT — PAIN DESCRIPTION - LOCATION
LOCATION: HEAD

## 2022-05-23 ASSESSMENT — PAIN SCALES - GENERAL
PAINLEVEL_OUTOF10: 10
PAINLEVEL_OUTOF10: 10
PAINLEVEL_OUTOF10: 8

## 2022-05-23 ASSESSMENT — PAIN - FUNCTIONAL ASSESSMENT
PAIN_FUNCTIONAL_ASSESSMENT: ACTIVITIES ARE NOT PREVENTED
PAIN_FUNCTIONAL_ASSESSMENT: 0-10
PAIN_FUNCTIONAL_ASSESSMENT: ACTIVITIES ARE NOT PREVENTED

## 2022-05-23 ASSESSMENT — PAIN DESCRIPTION - PAIN TYPE
TYPE: ACUTE PAIN

## 2022-05-23 ASSESSMENT — PAIN DESCRIPTION - ORIENTATION
ORIENTATION: ANTERIOR;RIGHT
ORIENTATION: ANTERIOR;RIGHT

## 2022-05-23 ASSESSMENT — LIFESTYLE VARIABLES: HOW OFTEN DO YOU HAVE A DRINK CONTAINING ALCOHOL: NEVER

## 2022-05-23 ASSESSMENT — PAIN DESCRIPTION - ONSET
ONSET: ON-GOING
ONSET: ON-GOING

## 2022-05-23 ASSESSMENT — PAIN DESCRIPTION - DESCRIPTORS
DESCRIPTORS: ACHING
DESCRIPTORS: ACHING;DULL

## 2022-05-23 ASSESSMENT — PAIN DESCRIPTION - FREQUENCY
FREQUENCY: CONTINUOUS
FREQUENCY: CONTINUOUS

## 2022-05-23 NOTE — ED PROVIDER NOTES
[place] ENCOUNTER          Pt Name: Rupert Shore  MRN: [id number]  [place] 5/29/1933  Date of evaluation: 5/23/2022  Treating Resident Physician: Klever Covarrubias MD  Supervising Physician: [address]       Chief Complaint   Patient presents with    Headache    Wound Check     History obtained from unobtainable from patient due to mental status. HISTORY OF PRESENT ILLNESS    HPI  Rupert Shore is a 80 y.o. female who presents to the emergency department for evaluation of increased confusion, headache, increased drainage from head wound. Patient has history of brain metastasis with craniotomy over a year ago, and procedure to drain subgaleal fluid. According to home health nurse, patient had increase drainage on her dressing today, as well as being more confused. Patient complaining of headache, unable to describe character or location. Patient is known to me, typically she is more arousable and interactive. The patient has no other acute complaints at this time.         REVIEW OF SYSTEMS   Review of Systems   Unable to perform ROS: Mental status change          PAST MEDICAL AND SURGICAL HISTORY     Past Medical History:   Diagnosis Date    Anxiety     Blind right eye     Breast cancer ([facility] [address] 05/23/2016    IDC @ [facility] only chemo, no surgery    Breast cancer metastasized to liver (5/16) and brain (7/21) ([facility] [address] 05/10/2016    Liver lesion noted 5/16 : Brain 7/21    Carotid stenosis      Left ICA 25%    Colostomy in place ([facility] [address] 05/27/2016    Degenerative arthritis of cervical spine 05/2007    GERD (gastroesophageal reflux disease) 11/2006    History of craniotomy 07/2021    mets from breast CA    Hx antineoplastic chemo 2016    left breast cancer, no surgery    Hypercholesteremia     Hypoestrogenism     Hypothyroidism     Lumbago     Lumbosacral spinal stenosis 05/2019    MDRO (multiple drug resistant organisms) resistance 2008    pt stated cleared    Metastasis ([facility] [address] 2019    from breast to brain    Personal history of cardiac dysrhythmia     Respiratory tract infection due to COVID-19 virus 01/21/2022    Sigmoid diverticulosis 08/2004    Spondylolisthesis of lumbosacral region 08/2004    Steroid-induced hyperglycemia     Subclavian artery stenosis (HCC)     left    Superior and Inferior Pubic ramus fracture, right, closed, initial encounter ([facility] [address] 05/21/2019    SVT (supraventricular tachycardia) ([facility] [address] 06/2007    Temporal arteritis ([facility] [address]     Vertebral artery occlusion     left    Vitamin D deficiency 06/2017     Past Surgical History:   Procedure Laterality Date    CARDIAC CATHETERIZATION  5/07    CATARACT REMOVAL  right 1/08; left 2/08    CHOLECYSTECTOMY  1/03    COLONOSCOPY  11/06    COLOSTOMY  09/04/2018    REVERSAL OF COLOSTOMY    CRANIOTOMY Right 7/2/2021    CRANIOTOMY FOR RESECTION/DEBULKING OF RIGHT SIDE INTRA BRAIN TUMOR performed by Ubaldo Lemus MD at [address] [place] [address] Right 5/11/2022    RIGHT SIDED CRANIOTOMY FOR TUMOR performed by Ubaldo Lemus MD at [address] [place] [address] [address], COLON, DIAGNOSTIC      EYE REMOVAL Right 03/2017    EYE SURGERY      EYE SURGERY Right 11/06/2017    Dr Fabiola Payan in [address]      partial    OTHER SURGICAL HISTORY  05/27/2016    robotic assisted laparoscopic anterior colon resection and end colostomy    AL OFFICE/OUTPT VISIT,PROCEDURE ONLY N/A 9/4/2018    COLOSTOMY REVERSAL AND PARASTOMAL HERNIA REPAIR performed by Maricruz Winston MD at [facility] / PULMONARY [facility]UNT  2002    UPPER GASTROINTESTINAL ENDOSCOPY  11/06     BREAST NEEDLE BIOPSY LEFT Left 05/23/2016    IDC         MEDICATIONS     Current Facility-Administered Medications:     albuterol sulfate  (90 Base) MCG/ACT inhaler 2 puff, 2 puff, Inhalation, Q6H PRN, Emily Ghotra MD    amiodarone (CORDARONE) tablet 200 mg, 200 mg, Oral, Daily, Jenifer Hampton MD    isosorbide mononitrate (IMDUR) extended release tablet 30 mg, 30 mg, Oral, Daily, Jenifer Hampton MD    levETIRAcetam (KEPPRA) tablet 500 mg, 500 mg, Oral, BID, Jenifer Hampton MD    levothyroxine (SYNTHROID) tablet 100 mcg, 100 mcg, Oral, Daily, Jenifer Hampton MD    cetirizine (ZYRTEC) tablet 10 mg, 10 mg, Oral, Daily, Jenifer Hampton MD    metoprolol tartrate (LOPRESSOR) tablet 12.5 mg, 12.5 mg, Oral, BID, Jenifer Hampton MD    multivitamin 1 tablet, 1 tablet, Oral, Daily, MD Toñito Ayala ON 5/24/2022] pantoprazole (PROTONIX) tablet 40 mg, 40 mg, Oral, QAM AC, Jenifer Hampton MD    predniSONE (DELTASONE) tablet 1 mg, 1 mg, Oral, Daily, Jenifer Hampton MD    sertraline (ZOLOFT) tablet 50 mg, 50 mg, Oral, Daily, Jenifer Hampton MD    traZODone (DESYREL) tablet 50 mg, 50 mg, Oral, Nightly, Jenfier Hampton MD    sodium chloride flush 0.9 % injection 5-40 mL, 5-40 mL, IntraVENous, 2 times per day, Jenifer Hampton MD    sodium chloride flush 0.9 % injection 5-40 mL, 5-40 mL, IntraVENous, PRN, Jenifer Hampton MD    0.9 % sodium chloride infusion, , IntraVENous, PRN, Jenifer Hampton MD    ondansetron (ZOFRAN-ODT) disintegrating tablet 4 mg, 4 mg, Oral, Q8H PRN **OR** ondansetron (ZOFRAN) injection 4 mg, 4 mg, IntraVENous, Q6H PRN, Jenifer Hampton MD    polyethylene glycol [facility]) packet 17 g, 17 g, Oral, Daily PRN, Jenifer Hampton MD    acetaminophen (TYLENOL) tablet 650 mg, 650 mg, Oral, Q6H PRN **OR** acetaminophen (TYLENOL) suppository 650 mg, 650 mg, Rectal, Q6H PRN, Jenifer Hampton MD    0.9 % sodium chloride infusion, , IntraVENous, Continuous, Jenifer Hampton MD    mupirocin (BACTROBAN) 2 % ointment, , Topical, Daily, Jenifer Hampton MD      SOCIAL HISTORY     Social History     Social History Narrative    Not on file     Social History     Tobacco Use    Smoking status: Former Smoker Packs/day: 0.50     Types: Cigarettes    Smokeless tobacco: Never Used   Vaping Use    Vaping Use: Former    Substances: Always   Substance Use Topics    Alcohol use: No    Drug use: No         ALLERGIES     Allergies   Allergen Reactions    Bactrim [Sulfamethoxazole-Trimethoprim] Swelling     Of tongue    Demerol Rash     nausea    Pcn [Penicillins] Rash         FAMILY HISTORY     Family History   Problem Relation Age of Onset    Breast Cancer Sister 61        breast    Cancer Brother 79        lung    Cancer Brother 68        colon    Cancer Son 48        lung         PREVIOUS RECORDS   Previous records reviewed: Medical, past surgical, medications, allergies        PHYSICAL EXAM     ED Triage Vitals [05/23/22 1127]   BP Temp Temp Source Pulse Resp SpO2 Height Weight   130/60 98.3 °F (36.8 °C) Oral 80 19 100 % -- --     Initial vital signs and nursing assessment reviewed and normal. There is no height or weight on file to calculate BMI. Pulsoximetry is normalper my interpretation. Additional Vital Signs:  Vitals:    05/23/22 1714   BP: 133/74   Pulse: 74   Resp: 18   Temp: 98.6 °F (37 °C)   SpO2:        Physical Exam  Constitutional:       Appearance: She is ill-appearing. Comments: Somnolent, difficult to arouse, oriented to person and place, not oriented to time or events   HENT:      Head:      Comments: Swelling over right parietal region sutures intact, minimal varus drainage from suture line, no erythema. Swelling is quite boggy. Right Ear: External ear normal.      Left Ear: External ear normal.      Nose: Nose normal.      Mouth/Throat:      Mouth: Mucous membranes are moist.      Pharynx: Oropharynx is clear. Eyes:      Conjunctiva/sclera: Conjunctivae normal.      Comments: Prosthetic IM on right. Left eye is approximately 3 mm does not appear to be responsive to light. Patient saying she can see things, but difficult to assess given patient's mental status.   It does not seem to track past midline   Cardiovascular:      Rate and Rhythm: Normal rate and regular rhythm. Pulses: Normal pulses. Heart sounds: Normal heart sounds. Pulmonary:      Effort: Pulmonary effort is normal. No respiratory distress. Breath sounds: Normal breath sounds. No wheezing or rales. Chest:      Chest wall: No tenderness. Abdominal:      General: Abdomen is flat. Bowel sounds are normal. There is no distension. Palpations: Abdomen is soft. Tenderness: There is no abdominal tenderness. Musculoskeletal:      Right lower leg: No edema. Left lower leg: No edema. Skin:     General: Skin is warm and dry. Capillary Refill: Capillary refill takes less than 2 seconds. Neurological:      Mental Status: She is disoriented. Sensory: No sensory deficit. Motor: No weakness. Comments: 4 extremities moves to pain. MEDICAL DECISION MAKING   Initial Assessment and Plan:    This is a 69-year-old female, history of brain metastasis status postcraniotomy, difficult drainage of fluid situation we got a week and a half ago, presenting with decreased level of consciousness, headache, increased drainage per at home nurse. Physical exam significant for bogginess of the craniotomy site of right parietal. Have had this patient on multiple occasions. She is not as alert and interactive she is typically. Significant for hyponatremia. CT shows no acute bleed. Neurosurgeon dropped by. He does not feel that this is a mass-effect. Would like to admit this patient for confusion. Urine pending.          ED RESULTS   Laboratory results:  Labs Reviewed   BASIC METABOLIC PANEL W/ REFLEX TO MG FOR LOW K - Abnormal; Notable for the following components:       Result Value    Sodium 124 (*)     Chloride 88 (*)     Glucose 109 (*)     Calcium 8.4 (*)     All other components within normal limits   HEPATIC FUNCTION PANEL - Abnormal; Notable for the following components:    AST 85 (*)     Total Protein 5.6 (*)     All other components within normal limits   CBC WITH AUTO DIFFERENTIAL - Abnormal; Notable for the following components:    RBC 3.86 (*)     Hemoglobin 10.9 (*)     Hematocrit 34.6 (*)     MCHC 31.5 (*)     All other components within normal limits   OSMOLALITY - Abnormal; Notable for the following components:    Osmolality Calc 251.8 (*)     All other components within normal limits   URINE WITH REFLEXED MICRO - Abnormal; Notable for the following components:    Blood, Urine SMALL (*)     Protein, UA TRACE (*)     All other components within normal limits   SPECIMEN REJECTION   ANION GAP   GLOMERULAR FILTRATION RATE, ESTIMATED   TSH WITH REFLEX   LEVETIRACETAM LEVEL   VITAMIN B12 & FOLATE       Radiologic studies results:  CT Head WO Contrast   Final Result      1. Postoperative changes involving the right posterior temporal and parietal calvarium. 2. 3.2 x 1.6 cm area of encephalomalacia in the right posterior temporal lobe slightly smaller than on previous study dated 16 May 2022. 3. Small amount of extra-axial air smaller than on previous study dated 16 May 2022. 4. Diminished attenuation in the white matter most likely representing ischemic changes. 5. Mild dilatation of the third and lateral ventricles. 6. Postoperative changes in the right globe. **This report has been created using voice recognition software. It may contain minor errors which are inherent in voice recognition technology. **      Final report electronically signed by DR Ced Ugarte on 5/23/2022 1:01 PM          ED Medications administered this visit:   Medications   albuterol sulfate  (90 Base) MCG/ACT inhaler 2 puff (has no administration in time range)   amiodarone (CORDARONE) tablet 200 mg (200 mg Oral Not Given 5/23/22 1716)   isosorbide mononitrate (IMDUR) extended release tablet 30 mg (30 mg Oral Not Given 5/23/22 1716)   levETIRAcetam (KEPPRA) tablet 500 mg (has no transcription was electronically signed. Parts of this transcriptions may have been dictated by use of voice recognition software and electronically transcribed, and parts may have been transcribed with the assistance of an ED scribe. The transcription may contain errors not detected in proofreading. Please refer to my supervising physician's documentation if my documentation differs.     Electronically Signed: Rahul Rivera MD, 05/23/22, 5:50 PM          Rahul Rivera MD  Resident  05/23/22 1677

## 2022-05-23 NOTE — CARE COORDINATION
Pauline Connie is in the ED and may be admitted. Will call tomorrow if not admitted. If admitted, CTN will sign off and follow once discharged if appropriate.

## 2022-05-23 NOTE — ED NOTES
Craniotomy done on may 11th and fluid pulled off according to ems. Pt was discharged this last Friday with home health seeing patient. It is reported that patients head wound is beginning to seep more fluid and pt complaining of head pain. Pt alert to voice, but oriented and follows commands.       Nayana Mendez RN  05/23/22 0295

## 2022-05-23 NOTE — TELEPHONE ENCOUNTER
Violette Osborne from [facility] called stating pt has a lot of fluid build up on her incision site. Violette Osborne stated the nurse over the weekend had not noticed any fluid but today there is a large amount of fluid. She reported the family also informed her that pt has been more confused and was wondering if she should be seen in the ER. I advised Violette Osborne they can take pt to ER and to be evaluated. Violette Osborne expressed understanding.

## 2022-05-23 NOTE — CONSULTS
NEUROSURGERY CONSULT    80 RH lady. Stage IV breast CA DXd in 2016 with liver mets. Brain mets > craniotomy in July 2021. (? ER/OK and [id number] status) Craniotomy reportedly followed by chemo. No mention of RT. Presented with right parieto-occipital recurrence. Re-operated on 5/11/2022. Had some subgaleal fluid aspirations post-op. Now presents with headaches and confusion. ROS/PMH  Blind in right eye by report  Breast CA  Temporal arteritis  HTN  Hypothyroid  GERD    /90  HR 80  T    36.8    Serum Na+ 124    NEURO:  Awake, alert, named month and year  Vision extremely difficult to test. May be blind. .. did not perceive flashing light. Clains to see hands moving but not convincing. Pupils 3mm fighting to open her eyelids. No reaction. Right prosthetic eye by report  Eye movements: cannot adduct right eye beyond midline (prosthetic)  Motor grossly 5/5  Sensory: no extinction    Incision healing well. Small amount of subgaleal fluid not under tension. CT: Expected post surgical changes right occipitoparietal. No evidence of hydrocephalus that could drive fluid leak    Impression/Plan:  Small amount of subgaleal fluid of no concern. If increases could try pressure head wrap.   Confusion most likely due to low serum Na+    Manage Na+    [facility]    Neurosurgery

## 2022-05-23 NOTE — ED PROVIDER NOTES
[facility]  ED Attending Physician Attestation     I performed a history and physical examination of the patient and discussed management with the Resident. I reviewed the Resident's note and agree with the documented findings and plan of care. Any areas of disagreement are noted on the chart. I was personally present for the key portions of any procedures and have documented in the chart those procedures where I was not present during the key portions. I have reviewed the emergency nurses triage note and agree with the chief complaint, past medical history, past surgical history, allergies, medications, social and family history as documented unless otherwise noted below. Pleasant 81yo female  Recently postop debulking of brain tumor with mutiple complications  Returns today with generalized headache  She feels somewhat more confused  No falls or trauma  She is awake, alert x3 but seems slightly confused.   Incision clean dry and intact; stitches in place  Trace erythema  No nuchal rigidity    Mari Cates, [address]  Attending Emergency Physician       Ana Rosa Ibrahim, DO  05/23/22 6360

## 2022-05-24 PROBLEM — S00.83XA TRAUMATIC ECCHYMOSIS OF FOREHEAD: Status: RESOLVED | Noted: 2021-06-28 | Resolved: 2022-05-24

## 2022-05-24 PROBLEM — C50.919 BREAST CANCER METASTASIZED TO BRAIN (HCC): Status: ACTIVE | Noted: 2022-01-01

## 2022-05-24 PROBLEM — G93.89 BRAIN MASS: Status: RESOLVED | Noted: 2021-06-28 | Resolved: 2022-01-01

## 2022-05-24 PROBLEM — U07.1 COVID-19 VIRUS INFECTION: Status: RESOLVED | Noted: 2022-01-01 | Resolved: 2022-01-01

## 2022-05-24 PROBLEM — I48.20 CHRONIC ATRIAL FIBRILLATION (HCC): Status: ACTIVE | Noted: 2022-01-01

## 2022-05-24 PROBLEM — C79.31 BREAST CANCER METASTASIZED TO BRAIN (HCC): Status: ACTIVE | Noted: 2022-01-01

## 2022-05-24 PROBLEM — U07.1 COVID-19: Status: RESOLVED | Noted: 2022-01-01 | Resolved: 2022-01-01

## 2022-05-24 LAB
ANION GAP SERPL CALCULATED.3IONS-SCNC: 7 MEQ/L (ref 8–16)
BUN BLDV-MCNC: 13 MG/DL (ref 7–22)
CALCIUM SERPL-MCNC: 7.7 MG/DL (ref 8.5–10.5)
CHLORIDE BLD-SCNC: 94 MEQ/L (ref 98–111)
CO2: 26 MEQ/L (ref 23–33)
CREAT SERPL-MCNC: 0.5 MG/DL (ref 0.4–1.2)
GFR SERPL CREATININE-BSD FRML MDRD: > 90 ML/MIN/1.73M2
GLUCOSE BLD-MCNC: 89 MG/DL (ref 70–108)
KEPPRA: 13 UG/ML (ref 10–40)
MAGNESIUM: 1.8 MG/DL (ref 1.6–2.4)
POTASSIUM SERPL-SCNC: 3.9 MEQ/L (ref 3.5–5.2)
SODIUM BLD-SCNC: 127 MEQ/L (ref 135–145)

## 2022-05-24 PROCEDURE — 6370000000 HC RX 637 (ALT 250 FOR IP): Performed by: EMERGENCY MEDICINE

## 2022-05-24 PROCEDURE — 80048 BASIC METABOLIC PNL TOTAL CA: CPT

## 2022-05-24 PROCEDURE — 2580000003 HC RX 258: Performed by: EMERGENCY MEDICINE

## 2022-05-24 PROCEDURE — 99214 OFFICE O/P EST MOD 30 MIN: CPT | Performed by: NEUROLOGICAL SURGERY

## 2022-05-24 PROCEDURE — G0378 HOSPITAL OBSERVATION PER HR: HCPCS

## 2022-05-24 PROCEDURE — 6360000002 HC RX W HCPCS: Performed by: EMERGENCY MEDICINE

## 2022-05-24 PROCEDURE — 36415 COLL VENOUS BLD VENIPUNCTURE: CPT

## 2022-05-24 PROCEDURE — 96361 HYDRATE IV INFUSION ADD-ON: CPT

## 2022-05-24 PROCEDURE — 83735 ASSAY OF MAGNESIUM: CPT

## 2022-05-24 PROCEDURE — 2500000003 HC RX 250 WO HCPCS: Performed by: EMERGENCY MEDICINE

## 2022-05-24 PROCEDURE — 96375 TX/PRO/DX INJ NEW DRUG ADDON: CPT

## 2022-05-24 RX ORDER — CIPROFLOXACIN HYDROCHLORIDE 3.5 MG/ML
1 SOLUTION/ DROPS TOPICAL
Status: DISPENSED | OUTPATIENT
Start: 2022-05-24 | End: 2022-05-26

## 2022-05-24 RX ADMIN — CEFTRIAXONE SODIUM 1000 MG: 1 INJECTION, POWDER, FOR SOLUTION INTRAMUSCULAR; INTRAVENOUS at 08:50

## 2022-05-24 RX ADMIN — SODIUM CHLORIDE: 9 INJECTION, SOLUTION INTRAVENOUS at 17:48

## 2022-05-24 RX ADMIN — TRAZODONE HYDROCHLORIDE 50 MG: 50 TABLET ORAL at 20:39

## 2022-05-24 RX ADMIN — SERTRALINE 50 MG: 50 TABLET, FILM COATED ORAL at 08:49

## 2022-05-24 RX ADMIN — LEVETIRACETAM 500 MG: 500 TABLET, FILM COATED ORAL at 20:39

## 2022-05-24 RX ADMIN — HYDROCODONE BITARTRATE AND ACETAMINOPHEN 1 TABLET: 7.5; 325 TABLET ORAL at 08:49

## 2022-05-24 RX ADMIN — PANTOPRAZOLE SODIUM 40 MG: 40 TABLET, DELAYED RELEASE ORAL at 06:18

## 2022-05-24 RX ADMIN — CIPROFLOXACIN 1 DROP: 3 SOLUTION OPHTHALMIC at 17:48

## 2022-05-24 RX ADMIN — CEFTRIAXONE SODIUM 1000 MG: 10 INJECTION, POWDER, FOR SOLUTION INTRAVENOUS at 23:42

## 2022-05-24 RX ADMIN — LEVOTHYROXINE SODIUM 100 MCG: 0.1 TABLET ORAL at 06:18

## 2022-05-24 RX ADMIN — AMIODARONE HYDROCHLORIDE 200 MG: 200 TABLET ORAL at 08:48

## 2022-05-24 RX ADMIN — Medication 1 TABLET: at 08:49

## 2022-05-24 RX ADMIN — CETIRIZINE HYDROCHLORIDE 10 MG: 10 TABLET, FILM COATED ORAL at 08:49

## 2022-05-24 RX ADMIN — CIPROFLOXACIN 1 DROP: 3 SOLUTION OPHTHALMIC at 08:49

## 2022-05-24 RX ADMIN — SODIUM CHLORIDE: 9 INJECTION, SOLUTION INTRAVENOUS at 08:24

## 2022-05-24 RX ADMIN — CIPROFLOXACIN 1 DROP: 3 SOLUTION OPHTHALMIC at 22:05

## 2022-05-24 RX ADMIN — CIPROFLOXACIN 1 DROP: 3 SOLUTION OPHTHALMIC at 12:22

## 2022-05-24 RX ADMIN — CIPROFLOXACIN 1 DROP: 3 SOLUTION OPHTHALMIC at 16:48

## 2022-05-24 RX ADMIN — MUPIROCIN: 20 OINTMENT TOPICAL at 08:47

## 2022-05-24 RX ADMIN — LEVETIRACETAM 500 MG: 500 TABLET, FILM COATED ORAL at 08:48

## 2022-05-24 RX ADMIN — PREDNISONE 1 MG: 1 TABLET ORAL at 08:49

## 2022-05-24 RX ADMIN — CIPROFLOXACIN 1 DROP: 3 SOLUTION OPHTHALMIC at 20:39

## 2022-05-24 RX ADMIN — SODIUM CHLORIDE, PRESERVATIVE FREE 10 ML: 5 INJECTION INTRAVENOUS at 08:50

## 2022-05-24 RX ADMIN — METOPROLOL TARTRATE 12.5 MG: 25 TABLET, FILM COATED ORAL at 20:39

## 2022-05-24 RX ADMIN — CIPROFLOXACIN 1 DROP: 3 SOLUTION OPHTHALMIC at 14:52

## 2022-05-24 RX ADMIN — CIPROFLOXACIN 1 DROP: 3 SOLUTION OPHTHALMIC at 10:48

## 2022-05-24 ASSESSMENT — PAIN DESCRIPTION - ORIENTATION: ORIENTATION: ANTERIOR

## 2022-05-24 ASSESSMENT — PAIN SCALES - GENERAL
PAINLEVEL_OUTOF10: 0
PAINLEVEL_OUTOF10: 4
PAINLEVEL_OUTOF10: 0

## 2022-05-24 ASSESSMENT — PAIN SCALES - WONG BAKER: WONGBAKER_NUMERICALRESPONSE: 0

## 2022-05-24 ASSESSMENT — PAIN DESCRIPTION - LOCATION: LOCATION: HEAD

## 2022-05-24 ASSESSMENT — PAIN DESCRIPTION - DESCRIPTORS: DESCRIPTORS: ACHING

## 2022-05-24 NOTE — PLAN OF CARE
Problem: Discharge Planning  Goal: Discharge to home or other facility with appropriate resources  Outcome: Progressing  Flowsheets (Taken 5/23/2022 2043)  Discharge to home or other facility with appropriate resources:   Identify barriers to discharge with patient and caregiver   Arrange for needed discharge resources and transportation as appropriate   Identify discharge learning needs (meds, wound care, etc)  Note: Patient and family actively involved in [address] [place] [address]. Discharge planning in progress at this time. Will continue to monitor. Problem: Pain  Goal: Verbalizes/displays adequate comfort level or baseline comfort level  Outcome: Progressing  Flowsheets (Taken 5/24/2022 0014)  Verbalizes/displays adequate comfort level or baseline comfort level:   Encourage patient to monitor pain and request assistance   Assess pain using appropriate pain scale   Administer analgesics based on type and severity of pain and evaluate response   Implement non-pharmacological measures as appropriate and evaluate response  Note: Patient c/o HA pain during this shift. Patient medicated per MAR. Will continue to monitor. Problem: Safety - Adult  Goal: Free from fall injury  Outcome: Progressing  Flowsheets (Taken 5/23/2022 2206)  Free From Fall Injury: Instruct family/caregiver on patient safety  Note: Patient educated on and encouraged to use the call light for assistance from staff with needs. Patient verbalizes an understanding of this. Bed wheels locked and bed in lowest position; bed alarm on. Patient possessions within reach; pathways clear at this time. Problem: ABCDS Injury Assessment  Goal: Absence of physical injury  Outcome: Progressing  Flowsheets (Taken 5/23/2022 2206)  Absence of Physical Injury: Implement safety measures based on patient assessment  Note: Patient educated on and encouraged to use the call light for assistance from staff with needs. Patient verbalizes an understanding of this.  Bed wheels locked and bed in lowest position; bed alarm on. Patient possessions within reach; pathways clear at this time. Problem: Skin/Tissue Integrity  Goal: Absence of new skin breakdown  Description: 1. Monitor for areas of redness and/or skin breakdown  2. Assess vascular access sites hourly  3. Every 4-6 hours minimum:  Change oxygen saturation probe site  4. Every 4-6 hours:  If on nasal continuous positive airway pressure, respiratory therapy assess nares and determine need for appliance change or resting period. Outcome: Progressing  Note: No signs and/or symptoms of infection noted during this shift. Patient afebrile during this shift. No new skin breakdown noted during this shift. Patient turned every two hours with pillow support utilized. Bilat heels floated off bed. Care plan reviewed with patient. Patient verbalizes understanding of the plan of care and contributes to goal setting.

## 2022-05-24 NOTE — PROGRESS NOTES
Initial Evaluation          Patient:   James Montemayor  YOB: 1933  Age:  80 y.o. Room:  [address] [place] [address]  MRN:  [id number]   Acct: [de-identified]    Date of Admission:  5/23/2022 11:23 AM  Date of Service:  5/24/2022  Completed By:  Devyn Singh RN                 Reason for Palliative Care Evaluation:-             [x] Code Status Discussion              [x] Goals of Care              [] Pain/Symptom Management               [] Emotional Support              [] Other:                   Current Issues:-  []  Pain  [x]  Fatigue  []  Nausea  []  Anxiety  []  Depression  []  Shortness of Breath  []  Constipation  []  Appetite  []  Other:             Advance Directives:-  [x] [place] DNR Form  [x] Living Will  [x] Medical POA             Current Code Status:-  [] Full Resuscitation  [x] DNR-Comfort Care-Arrest  [] DNR-Comfort Care       [] Limited Resuscitation             [] No CPR            [] No shock            [] No ET intubation/reintubation            [] No resuscitative medications            [] Other limitation:              Palliative Performance Status:          [] 60%  Ambulation reduced; Significant disease;Can't do hobbies/housework; intake normal or reduced; occasional assist; LOC full/confusion        [x] 50%  Mainly sit/lie; Extensive disease; Can't do any work; Considerable assist; intake normal or reduced; LOC full/confusion        [x] 40%  Mainly in bed; Extensive disease; Mainly assist; intake normal or reduced; LOC full/confusion         [] 30%  Bed Bound; Extensive disease; Total care; intake reduced; LOC full/confusion        [] 20%  Bed Bound; Extensive disease; Total care; intake minimal; Drowsy/coma        [] 10%  Bed Bound; Extensive disease;  Total care; Mouth care only; Drowsy/coma        [] 0  Death        Goals of care evaluation:-        The patient goals of care are to provide comfort care/supportive services/palliation & relieve suffering:  Goals of care discussed with:  [] Patient independently  [] Patient and Family  [] Family or Healthcare DPOA independently  [x] Unable to discuss with patient, family/DPOA not present         Family/Patient Discussion:  Spoke with Latanya Dickens's niece/POA on phone. Discussed plan of care, code status, and discharge planning. Adryan Clinton stated Latanya's brother and niece are planning on staying with her once she is discharged. She stated she understands that Yakelin Sevilla is weak and will require a lot of care at home but the family is willing to care for her. Asked Adryan Clinton what Latanya's wishes are regarding her code status. She said she thinks Yakelin Sevilla would not want to be put on a ventilator (currently [facility] code status) but is not sure. She would like to discuss this with her family. Briefly discuss hospice care as Yakelin Sevilla might be appropriate for a consult. Adryan Clinton agreed but wants to discuss this with her family as well. She did not want to make any changes to Yakelin Sevilla care without talking with the family first.         Plan/Follow-Up:  Adryan Clinton was provided with the palliative care office number and encouraged to talk with her family and call back with any questions or concerns. Made Adryan Clinton aware that a family meeting could be conducted to address everyone's concerns if needed. Adryan Clinton voiced understanding and appreciated the information.         Electronically signed by Saskia Frankel RN on 5/24/2022 at 3:33 PM           Palliative Care Office: [phone]

## 2022-05-24 NOTE — PROGRESS NOTES
NEUROSURGERY PROGRESS NOTE     80 RH lady. Stage IV breast CA DXd in 2016 with liver mets. Brain mets > craniotomy in July 2021. (? ER/SD and [id number] status) Craniotomy reportedly followed by chemo. No mention of RT. Presented with right parieto-occipital recurrence. Re-operated on 5/11/2022. Had some subgaleal fluid aspirations post-op. Now presents with headaches and confusion.     BP 80/65  HR 60  T    36.7     Serum Na+ 127  WBC  9.8     NEURO:  Awake, alert, confused, fairly cooperative  Vision extremely difficult to test.  Motor grossly 5/5       Incision . Small amount of subgaleal fluid not under tension. There is some drainage (brown serosanguinous) on pillow that was not conspicuous in ER yesterday. Placed dressing and curlix to see where fluid may be coming from with a view to a possible suture.      CT yesterday: Expected post surgical changes right occipitoparietal. No evidence of hydrocephalus that could drive fluid leak     Impression/Plan:  Small amount of subgaleal fluid of no concern.head wrap to localize source of some drainage.     Manage Na+     [facility]    Neurosurgery

## 2022-05-24 NOTE — PROGRESS NOTES
Family Medicine Progress Notes/Coverage    Today's Date: 5/24/22  4A-09/009-A  Medical Record # [id number]  CSN/Account # [de-identified]      Ms. Rod admitted on 5/23/2022        Subjective / Interval History :     Greenish drainage on the incision site scalp  + eye drainage  Still confused  Reviewed notes, consults, laboratory and radiology results,    Objective:       Physical Exam:  Patient Vitals for the past 24 hrs:   BP Temp Temp src Pulse Resp SpO2   05/24/22 0341 (!) 105/56 97.8 °F (36.6 °C) Oral 60 18 97 %   05/23/22 2339 (!) 143/84 97.7 °F (36.5 °C) Oral 100 18 98 %   05/23/22 2005 (!) 150/63 98.6 °F (37 °C) Oral 97 20 99 %   05/23/22 1714 133/74 98.6 °F (37 °C) Oral 74 18    05/23/22 1623 (!) 144/65   87 20 99 %   05/23/22 1556 (!) 128/54   84 19 99 %   05/23/22 1459 (!) 126/92   81 16 98 %   05/23/22 1309 (!) 141/68   76 17 100 %   05/23/22 1127 130/60 98.3 °F (36.8 °C) Oral 80 19 100 %       General Appearance:  Alert, cooperative, no distress, appears stated age   HEENT:  Neck: Seroma scalp is softer non tender + green drainage in the incision site   Chest/Lungs:  Heart: Few crackle at bases  IRRR   Abdomen:  Back: soft   Extremities:  Neurological Exam: No edema  + right eye enuclation + green drainage       Assessment:     Admitting Diagnosis:    Confusion [R41.0]  Intractable headache, unspecified chronicity pattern, unspecified headache type [R51.9]    Active Hospital Problems    Diagnosis Date Noted    Chronic atrial fibrillation ([facility] [I48.20] 05/24/2022     Priority: High    Breast 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 ([facility] [facility] [C50.919, C79.31] 05/24/2022     Priority: High    Confusion [R41.0] 05/23/2022     Priority: High    Brain metastases [facility] [C79.31]      Priority: High    Status post craniotomy [Z98.890] 07/02/2021     Priority: High  Essential hypertension [I10]      Priority: Medium    History of craniotomy [Z98.890] 07/2021     Priority: Medium    Hx of breast cancer with liver mets [Z85.3] 08/13/2020     Priority: Medium    Current chronic use of systemic steroids [Z79.52] 09/04/2018     Priority: Medium    Coronary artery disease involving native heart without angina pectoris [I25.10] 11/06/2017     Priority: Medium    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]      Priority: Medium    Breast cancer metastasized to liver ([facility] [address] [C50.919, C78.7] 05/10/2016     Priority: Medium    Hypothyroidism due to acquired atrophy of thyroid [E03.4] 03/03/2016     Priority: Medium    Temporal arteritis ([facility] [M31.6]      Priority: Medium    GERD (gastroesophageal reflux disease) [K21.9]      Priority: Medium    Hypothyroidism [E03.9]      Priority: Medium       Plan:     Discussed plans with the nursing staff  Rocephin, Ciloxan to the eye  PT/OT  Palliative re -hospice , DNR-CCA patient do not have children, unmarried  POA is her neice  SCD    Medications, Laboratories and Imaging results:    Scheduled Meds:   ciprofloxacin  1 drop Both Eyes Q2H While awake    cefTRIAXone (ROCEPHIN) IV  1,000 mg IntraVENous Once    amiodarone  200 mg Oral Daily    isosorbide mononitrate  30 mg Oral Daily    levETIRAcetam  500 mg Oral BID    levothyroxine  100 mcg Oral Daily    cetirizine  10 mg Oral Daily    metoprolol tartrate  12.5 mg Oral BID    multivitamin  1 tablet Oral Daily    pantoprazole  40 mg Oral QAM AC    predniSONE  1 mg Oral Daily    sertraline  50 mg Oral Daily    traZODone  50 mg Oral Nightly    sodium chloride flush  5-40 mL IntraVENous 2 times per day    mupirocin   Topical Daily     Continuous Infusions:   sodium chloride      sodium chloride 100 mL/hr at 05/23/22 1845     PRN Meds:albuterol sulfate HFA, sodium chloride flush, sodium chloride, ondansetron **OR** ondansetron, polyethylene glycol, acetaminophen **OR** acetaminophen, HYDROcodone-acetaminophen    Imaging:    Lab Review :    Lab Results   Component Value Date    WBC 9.8 05/23/2022    HGB 10.9 (L) 05/23/2022    HCT 34.6 (L) 05/23/2022    MCV 89.6 05/23/2022     05/23/2022     Lab Results   Component Value Date    CREATININE 0.5 05/23/2022    BUN 17 05/23/2022     (L) 05/23/2022    K 4.1 05/23/2022    CL 88 (L) 05/23/2022    CO2 28 05/23/2022     Lab Results   Component Value Date    CKTOTAL 70 06/28/2021     Lab Results   Component Value Date    ALT 63 05/23/2022    AST 85 (H) 05/23/2022    ALKPHOS 42 05/23/2022    BILITOT 0.4 05/23/2022     Lab Results   Component Value Date    LIPASE 20.4 05/06/2022         Electronically signed by Wallace France MD on 5/24/22 at 7:22 AM AMELIA Siddiqi M.D.

## 2022-05-24 NOTE — CARE COORDINATION
5/24/22, 8:02 AM EDT    DISCHARGE PLANNING EVALUATION    Spoke to patient's Passport  Emilie Srivastava ([phone]) this morning, and made her aware that Addison Leary had been admitted to the hospital yesterday. She states that she will call [facility] and have her services paused while she is here. Kalpana Ewing gave  a number for [facility] ([phone]). She is the transitional care nurse for Addison Leary, and should be contacted moving forward with updates. 5/24/22, 1:22 PM EDT    DISCHARGE PLANNING EVALUATION    Spoke with Addison Leary, her brother Elier Nj and a niece, Hanna Cantu. Yasmin expressed concern about Addison Leary going home last admission, ALEX explained that family was adamant about taking Latanya home, as they have seen bad experiences with nursing homes and refuse to put Addison Leary through that. Elier Nj would like home health therapy to come every day, ALEX explained that this would not be possible, it would be more like 3 days a week. He is agreeable to this. Elier Nj would like to know who is listed as the POA for Latanya, as there is some confusion between the family. SW will look into this and let him know.

## 2022-05-24 NOTE — H&P
[address] [id number]                              HISTORY AND PHYSICAL    PATIENT NAME: Carmen Olivares                      :        1933  MED REC NO:   [id number]                           ROOM:       [id number]  ACCOUNT NO:   [de-identified]                           ADMIT DATE: 2022  PROVIDER:     Edward Lesches. Isabel Bermudez M.D.    [address]:  The patient is an 25-year-old female, brought in here  by the EMS because of confusion and seepage of the incision on the right  parietal head. HISTORY OF PRESENT ILLNESS:  The patient is an 25-year-old female, who  had a known history of metastatic breast cancer, metastasized to the  liver and also to the colon, and to the brain. The patient had a  debulking of recurrent cancer of the breast and to the head by Dr. Areli Villegas, was re-operated on 2022. The patient subsequently  developed a subgaleal fluid on the right parietal head where the  incision is and sutures are, and has been aspirated twice during her  last hospitalization. The patient subsequently was discharged this past  Friday, 2022 and I have seen the patient that time and the patient  had a significant swelling on the right parietal head, the swelling is  very tense; however, it is not infected. The patient was discharged on  that day and at home, she was attended by the home health nurse,  noticing more fluids coming out from the suture lines and the patient  seems to be more confused. The patient was seen in the emergency room  and was noted to have a hyponatremia with the sodium of 124 for which  the patient was hence admitted. When I have seen the patient, the patient is confused. She is unable to  recognize me and usually she recognizes me easily. Denies any fever.    No chills, been complaining of headache, continue to have some blurred  vision and the right eye is blind secondary to temporal arteritis in the  past.    REVIEW OF SYSTEMS:  Positive for headache. Positive confusion. Positive right parietal scalp wound drainage with swelling. Positive  blurred vision and blindness in the right eye. No sore throat, cough,  cold. No shortness of breath or chest pain. No nausea or vomiting. Abdominal pain, back pain, dysuria, frequency, or hematuria. The rest  of the review of systems is negative. PAST MEDICAL HISTORY:  The patient had a history of breast cancer noted  in 2016, underwent radiation of chemotherapy and the patient had  metastasized to the breast in 05/2016, and to the brain 07/2021. The  patient had a history of right eye blindness from temporal arteritis,  anxiety, carotid stenosis, GERD, hypercholesterolemia, hypothyroidism,  hypoestrogenism, chronic back pain, spondylolisthesis, steroid-induced  hyperglycemia, vitamin D deficiency. PAST SURGICAL HISTORY:  The patient recently had debulking of the  recurrent breast cancer into the brain 05/11/2022, first craniotomy was  07/22/2021, had reversal of colostomy in 2018, cholecystectomy in 2003,  cataract surgery, heart catheterization, D and C, enucleation on the  right eye. FAMILY HISTORY:  Significant for family history with breast cancer in  sister and brother had a lung cancer, another brother had colon cancer. SOCIAL HISTORY:  The patient has been single all throughout her life,  never been sexually active. No smoking or substance abuse. MEDICATIONS:  See nursing notes. ALLERGIES:  THE PATIENT IS ALLERGIC TO BACTRIM, DEMEROL AND PENICILLIN. PHYSICAL EXAMINATION:  GENERAL:  When I had seen the patient, the patient is confused, unable  to recognize, finally she was able the recall. VITAL SIGNS:  Blood pressure 133/74, respirations 18, pulse 74,  temperature 98. 6. HEENT:  Head, there is a healing sutured scalp, incised wound on the  scalp secondary to recent debulking/craniotomy of the metastatic brain  cancer.   There is a swelling, fluid is more soft as compared when I had  seen the patient, last time it was very firm, today it is softer. There  is a serous fluid coming out from suture lines. The right eye is enucleated. Left eye showed minimal drainage. Oral mucosa is moist.  No  tonsillopharyngeal congestion. NECK:  Supple. LUNGS:  Clear to auscultation. Decreased breath sounds. Occasional  crackles in the bases. HEART:  Irregular rate and rhythm. ABDOMEN:  Flat, soft, depressible, nontender. No hepatosplenomegaly. BACK:  No CVA tenderness. EXTREMITIES:  No edema. NEUROLOGIC:  Cranial nerves II through XII are all intact. Motor and  sensory 100%. ASSESSMENT:  1. Confusion. 2.  Hyponatremia with the sodium of 124.  3.  Recent debulking of the right head secondary to metastatic breast  Cancer. 4.  Hypothyroidism. 5.  History of temporal arteritis with right eye blindness. 6.  GERD. 7.  Hyperlipidemia. 8.  Coronary artery disease. 9.  Anxiety. 10.  Metastatic breast cancer to the liver and brain. 11.  Atrial fibrillation, persistent since last hospitalization   12. Status post craniotomy day 12.  13.  Impaired mobility and balance. 14.  Brain mets. 15.  Hypertension. 16.  Vitamin D deficiency. PLAN:  The patient was admitted. She is going to have a slow hydration  with saline. Resume home medications. Lovenox was not given because of  the patient recent head surgery. We are going to give SCDs at this  time. Monitor the patient and replace the sodium and electrolyte  imbalance. The patient will be followed up accordingly and further  orders will be based on clinical course and laboratory and x-ray  findings. CYNTHIA Anaya M.D.    D: 05/23/2022 17:36:51       T: 05/23/2022 20:32:48     DELONTE/DAVID  Job#: [id number]     Doc#: [id number]    CC:

## 2022-05-24 NOTE — PROGRESS NOTES
[address]  PHYSICAL THERAPY MISSED TREATMENT NOTE  [facility]    Date: 2022  Patient Name: Marisol Matthews        MRN: [id number]   : 1933  (80 y.o.)  Gender: female                REASON FOR MISSED TREATMENT:  Hold treatment per nursing request.  Pt having low BPs this morning (78/66). Will check back as able. Rayo Fajardo.  Jer Pryor, [place] [place] [address]

## 2022-05-24 NOTE — CASE COMMUNICATION
5/24/22, 7:49 AM EDT  DISCHARGE PLANNING EVALUATION:    Rupert Shore       Admitted: 5/23/2022/ Regan 3 day: 0   Location: -09/009-A Reason for admit: Confusion [R41.0]  Intractable headache, unspecified chronicity pattern, unspecified headache type [R51.9]   PMH:  has a past medical history of Anxiety, Blind right eye, Breast cancer ([address] Breast cancer metastasized to liver (5/16) and brain (7/21) ([address] Carotid stenosis, Colostomy in place [facility]), Degenerative arthritis of cervical spine, GERD (gastroesophageal reflux disease), History of craniotomy, Hx antineoplastic chemo, Hypercholesteremia, Hypoestrogenism, Hypothyroidism, Lumbago, Lumbosacral spinal stenosis, MDRO (multiple drug resistant organisms) resistance, Metastasis ([address] Personal history of cardiac dysrhythmia, Respiratory tract infection due to COVID-19 virus, Sigmoid diverticulosis, Spondylolisthesis of lumbosacral region, Steroid-induced hyperglycemia, Subclavian artery stenosis ([address] Superior and Inferior Pubic ramus fracture, right, closed, initial encounter ([address] SVT (supraventricular tachycardia) ([address] Temporal arteritis ([address] Vertebral artery occlusion, and Vitamin D deficiency. Procedure:   CT Head WO Contrast 1. Postoperative changes involving the right posterior temporal and parietal calvarium. 2. 3.2 x 1.6 cm area of encephalomalacia in the right posterior temporal lobe slightly smaller than on previous study dated 16 May 2022. 3. Small amount of extra-axial air smaller than on previous study dated 16 May 2022. 4. Diminished attenuation in the white matter most likely representing ischemic changes. 5. Mild dilatation of the third and lateral ventricles. 6. Postoperative changes in the right globe. Barriers to Discharge:  From ED, PT/OT, Palliative Care eval, Neurosurgery consult, IV fluids, IV Rocephin, Cipro eye drops, Keppra, Protonix, oral Prednisone.    PCP: Lyssa Nino MD   %  Patient's Healthcare

## 2022-05-24 NOTE — PLAN OF CARE
Problem: Discharge Planning  Goal: Discharge to home or other facility with appropriate resources  5/24/2022 1321 by MIGEL Mauricio  Outcome: Progressing     Consult received. Please see SW note dated 5/24.

## 2022-05-24 NOTE — PROGRESS NOTES
[address] [facility] THERAPY MISSED TREATMENT NOTE  [facility]  4A-09/009-A      Date: 2022  Patient Name: Deborah Virk        CSN: [id number]   : 1933  (80 y.o.)  Gender: female                REASON FOR MISSED TREATMENT: Hold Treatment per Nursing. RN reporting patient is hypotensive with BP 78/66. OT will hold and check back as time allows.

## 2022-05-24 NOTE — PLAN OF CARE
Problem: Discharge Planning  Goal: Discharge to home or other facility with appropriate resources  5/24/2022 1123 by Barry Andersen RN  Outcome: Progressing  Flowsheets (Taken 5/23/2022 2043 by Mindy Condon RN)  Discharge to home or other facility with appropriate resources:   Identify barriers to discharge with patient and caregiver   Arrange for needed discharge resources and transportation as appropriate   Identify discharge learning needs (meds, wound care, etc)     Problem: Pain  Goal: Verbalizes/displays adequate comfort level or baseline comfort level  5/24/2022 1123 by Barry Andersen RN  Outcome: Progressing  Flowsheets (Taken 5/24/2022 0014 by Mindy Condon RN)  Verbalizes/displays adequate comfort level or baseline comfort level:   Encourage patient to monitor pain and request assistance   Assess pain using appropriate pain scale   Administer analgesics based on type and severity of pain and evaluate response   Implement non-pharmacological measures as appropriate and evaluate response     Problem: Safety - Adult  Goal: Free from fall injury  5/24/2022 1123 by Barry Andersen RN  Outcome: Progressing  Flowsheets (Taken 5/24/2022 1123)  Free From Fall Injury: Instruct family/caregiver on patient safety     Problem: ABCDS Injury Assessment  Goal: Absence of physical injury  5/24/2022 1123 by Barry Andersen RN  Outcome: Progressing  Flowsheets (Taken 5/23/2022 2206 by Mindy Condon RN)  Absence of Physical Injury: Implement safety measures based on patient assessment     Problem: Skin/Tissue Integrity  Goal: Absence of new skin breakdown  Description: 1. Monitor for areas of redness and/or skin breakdown  2. Assess vascular access sites hourly  3. Every 4-6 hours minimum:  Change oxygen saturation probe site  4. Every 4-6 hours:  If on nasal continuous positive airway pressure, respiratory therapy assess nares and determine need for appliance change or resting period.   5/24/2022 1123 by Valerio Burden RN  Outcome: Progressing  Note: No signs of new skin breakdown with each assessment. Skin remains warm, dry, intact.  Mucous membranes pink & moist.

## 2022-05-25 LAB
ANION GAP SERPL CALCULATED.3IONS-SCNC: 7 MEQ/L (ref 8–16)
BUN BLDV-MCNC: 9 MG/DL (ref 7–22)
CALCIUM IONIZED: 1.2 MMOL/L (ref 1.12–1.32)
CALCIUM SERPL-MCNC: 7.5 MG/DL (ref 8.5–10.5)
CHLORIDE BLD-SCNC: 101 MEQ/L (ref 98–111)
CO2: 25 MEQ/L (ref 23–33)
CREAT SERPL-MCNC: 0.5 MG/DL (ref 0.4–1.2)
GFR SERPL CREATININE-BSD FRML MDRD: > 90 ML/MIN/1.73M2
GLUCOSE BLD-MCNC: 89 MG/DL (ref 70–108)
POTASSIUM SERPL-SCNC: 4.2 MEQ/L (ref 3.5–5.2)
SODIUM BLD-SCNC: 133 MEQ/L (ref 135–145)

## 2022-05-25 PROCEDURE — 2500000003 HC RX 250 WO HCPCS: Performed by: EMERGENCY MEDICINE

## 2022-05-25 PROCEDURE — 6360000002 HC RX W HCPCS: Performed by: EMERGENCY MEDICINE

## 2022-05-25 PROCEDURE — 97166 OT EVAL MOD COMPLEX 45 MIN: CPT

## 2022-05-25 PROCEDURE — 6370000000 HC RX 637 (ALT 250 FOR IP): Performed by: EMERGENCY MEDICINE

## 2022-05-25 PROCEDURE — 99232 SBSQ HOSP IP/OBS MODERATE 35: CPT | Performed by: NEUROLOGICAL SURGERY

## 2022-05-25 PROCEDURE — G0378 HOSPITAL OBSERVATION PER HR: HCPCS

## 2022-05-25 PROCEDURE — 2060000000 HC ICU INTERMEDIATE R&B

## 2022-05-25 PROCEDURE — 97162 PT EVAL MOD COMPLEX 30 MIN: CPT

## 2022-05-25 PROCEDURE — 2580000003 HC RX 258: Performed by: EMERGENCY MEDICINE

## 2022-05-25 PROCEDURE — 99223 1ST HOSP IP/OBS HIGH 75: CPT | Performed by: PHYSICAL MEDICINE & REHABILITATION

## 2022-05-25 PROCEDURE — 36415 COLL VENOUS BLD VENIPUNCTURE: CPT

## 2022-05-25 PROCEDURE — 80048 BASIC METABOLIC PNL TOTAL CA: CPT

## 2022-05-25 PROCEDURE — 96361 HYDRATE IV INFUSION ADD-ON: CPT

## 2022-05-25 PROCEDURE — 97530 THERAPEUTIC ACTIVITIES: CPT

## 2022-05-25 PROCEDURE — 82330 ASSAY OF CALCIUM: CPT

## 2022-05-25 PROCEDURE — 97116 GAIT TRAINING THERAPY: CPT

## 2022-05-25 PROCEDURE — 97535 SELF CARE MNGMENT TRAINING: CPT

## 2022-05-25 RX ADMIN — TRAZODONE HYDROCHLORIDE 50 MG: 50 TABLET ORAL at 20:59

## 2022-05-25 RX ADMIN — CIPROFLOXACIN 1 DROP: 3 SOLUTION OPHTHALMIC at 06:19

## 2022-05-25 RX ADMIN — MUPIROCIN: 20 OINTMENT TOPICAL at 09:17

## 2022-05-25 RX ADMIN — CEFTRIAXONE SODIUM 1000 MG: 10 INJECTION, POWDER, FOR SOLUTION INTRAVENOUS at 09:18

## 2022-05-25 RX ADMIN — CIPROFLOXACIN 1 DROP: 3 SOLUTION OPHTHALMIC at 12:29

## 2022-05-25 RX ADMIN — LEVETIRACETAM 500 MG: 500 TABLET, FILM COATED ORAL at 20:58

## 2022-05-25 RX ADMIN — CIPROFLOXACIN 1 DROP: 3 SOLUTION OPHTHALMIC at 08:00

## 2022-05-25 RX ADMIN — LEVETIRACETAM 500 MG: 500 TABLET, FILM COATED ORAL at 09:17

## 2022-05-25 RX ADMIN — SODIUM CHLORIDE, PRESERVATIVE FREE 10 ML: 5 INJECTION INTRAVENOUS at 21:04

## 2022-05-25 RX ADMIN — LEVOTHYROXINE SODIUM 100 MCG: 0.1 TABLET ORAL at 06:19

## 2022-05-25 RX ADMIN — AMIODARONE HYDROCHLORIDE 200 MG: 200 TABLET ORAL at 09:17

## 2022-05-25 RX ADMIN — SODIUM CHLORIDE: 9 INJECTION, SOLUTION INTRAVENOUS at 04:01

## 2022-05-25 RX ADMIN — ISOSORBIDE MONONITRATE 30 MG: 30 TABLET, EXTENDED RELEASE ORAL at 09:17

## 2022-05-25 RX ADMIN — METOPROLOL TARTRATE 12.5 MG: 25 TABLET, FILM COATED ORAL at 20:59

## 2022-05-25 RX ADMIN — CIPROFLOXACIN 1 DROP: 3 SOLUTION OPHTHALMIC at 21:58

## 2022-05-25 RX ADMIN — PREDNISONE 1 MG: 1 TABLET ORAL at 09:17

## 2022-05-25 RX ADMIN — CEFTRIAXONE SODIUM 1000 MG: 10 INJECTION, POWDER, FOR SOLUTION INTRAVENOUS at 21:09

## 2022-05-25 RX ADMIN — Medication 1 TABLET: at 09:17

## 2022-05-25 RX ADMIN — CIPROFLOXACIN 1 DROP: 3 SOLUTION OPHTHALMIC at 09:35

## 2022-05-25 RX ADMIN — CETIRIZINE HYDROCHLORIDE 10 MG: 10 TABLET, FILM COATED ORAL at 09:17

## 2022-05-25 RX ADMIN — METOPROLOL TARTRATE 12.5 MG: 25 TABLET, FILM COATED ORAL at 09:17

## 2022-05-25 RX ADMIN — SERTRALINE 50 MG: 50 TABLET, FILM COATED ORAL at 09:21

## 2022-05-25 RX ADMIN — PANTOPRAZOLE SODIUM 40 MG: 40 TABLET, DELAYED RELEASE ORAL at 06:19

## 2022-05-25 RX ADMIN — CIPROFLOXACIN 1 DROP: 3 SOLUTION OPHTHALMIC at 16:00

## 2022-05-25 RX ADMIN — CIPROFLOXACIN 1 DROP: 3 SOLUTION OPHTHALMIC at 18:42

## 2022-05-25 RX ADMIN — CIPROFLOXACIN 1 DROP: 3 SOLUTION OPHTHALMIC at 20:58

## 2022-05-25 ASSESSMENT — PAIN SCALES - GENERAL: PAINLEVEL_OUTOF10: 0

## 2022-05-25 NOTE — PROGRESS NOTES
Hospice met with Addison Laery and her brother Elier Nj in patient room. Addison Leary denies adverse symptoms and has no S/S of. Discussed that hospice nurse was updated that patient wishing for hospice with no further returns to hospital. Elier Nj told nurse that plan is for rehab for few weeks and when ready for discharge, pt wishing to go home with hospice care. Waited on Galen's daughter Remberto Mera and granddaughter Johnna Pisano to arrive for referral.   Hospice concepts, philosophies, and services explained. family voiced understanding and wish to care for patient in home until passing. At this time will await when plan of discharge from rehab and start plan of care for discharge home with hospice. Family members that are present are going to return to home in [place] to get things in line, to be able to return to care for [facility]. Updated primary nurse Jerson CONTEH of plan.

## 2022-05-25 NOTE — PROGRESS NOTES
Keeshamarvinansley Copeland 60  INPATIENT OCCUPATIONAL THERAPY  [facility] NEUROSCIENCES 4A  EVALUATION    Time:   Time In: 926  Time Out: 403  Timed Code Treatment Minutes: 23 Minutes  Minutes: 33          Date: 2022  Patient Name: Meliton Tello,   Gender: female      MRN: [id number]  : 1933  (80 y.o.)  Referring Practitioner: Justin Leonard. Pratibha Diaz MD  Diagnosis: Confusion  Additional Pertinent Hx: Per chart review, \"Latanya Uribe is a 80 y.o. female who presents to the emergency department for evaluation of increased confusion, headache, increased drainage from head wound. Patient has history of brain metastasis with craniotomy over a year ago, and procedure to drain subgaleal fluid. According to home health nurse, patient had increase drainage on her dressing today, as well as being more confused. Patient complaining of headache, unable to describe character or location. Patient is known to me, typically she is more arousable and interactive. The patient has no other acute complaints at this time. \"    Restrictions/Precautions:  Restrictions/Precautions: Fall Risk,General Precautions    Subjective  Chart Reviewed: Yes,Orders,Progress Notes,History and Physical,Imaging,Labs  Patient assessed for rehabilitation services?: Yes  Family / Caregiver Present: No    Subjective: RN approved session. Patient supine in bed upon OT arrival and A&Ox1. Patient requesting family members and unaware of where she is at.     Pain: No pain reported     Vitals: Blood Pressure: 140/53   Oxygen: 2 L O2 NC    Social/Functional History:  Lives With: Family  Type of Home: House  Home Layout: One level  Home Access: Level entry  Home Equipment: Cane   Bathroom Shower/Tub: Tub/Shower unit  Bathroom Toilet: Handicap height  Bathroom Equipment: Shower chair  Bathroom Accessibility: Accessible    Receives Help From: Family  ADL Assistance: Independent  Homemaking Assistance: Independent  Homemaking Responsibilities: No  Ambulation Assistance: Independent  Transfer Assistance: Independent    Active : No  Patient's  Info: family  Occupation: Retired  Additional Comments: accuracy of history obtained unknow due to patients confusion. VISION:visual impairments  Assessed patient for tracking, patient demo delay in tracking and would turn head instead of following with eyes only. Writer asked patient about colors and lighting in the room and she was able to identify correct color. Writer ensured patient was able to view drinks and accurately reach for them and use call light. HEARING:  WFL    COGNITION: Slow Processing, Decreased Recall, Impaired Memory, Decreased Problem Solving, Decreased Safety Awareness and Difficulty Following Commands    RANGE OF MOTION:  Bilateral Upper Extremity:  WFL    STRENGTH:  Bilateral Upper Extremity:  generalized weakness and deconditioning. SENSATION:   WFL    ADL:   Grooming: Moderate Assistance. patient sat EOB and lifted UE while writer applied deodorant  Bathing: Moderate Assistance. patient sat EOD to wash UB, vision impacted ability to complete, writer assisted with UB. patient stood with 2w/w and required MOD A. to [facility] and wash dc area  Upper Extremity Dressing: Minimal Assistance. to [facility] gown. Risa Martin BALANCE:  Sitting Balance:  Stand By Assistance. to sit EOB. patient had difficulty maintaining upright position and tended to lean laterally to her right and demo decreased trunk strength. verbal cues to maintain upright posture. Standing Balance: Contact Guard Assistance. <> MIN A. patient stood with 2w/w and demo anterior leaning posture with WB through UE. she required verbal and tactile cues to maintain upright posture. patient demo weaknes. BED MOBILITY:  Supine to Sit: Minimal Assistance to sit EOB.  verbal, tactile and hand over hand cues given to move legs to EOB and use UE to pull self up, assist to lower legs onto floor and raise UB.    TRANSFERS:  Sit to Stand: Moderate Assistance. to stand from EOB. verbal, tactile and hand over hand cues given to push off bed and locate 2w/w as patient had difficulty viewing the 2w/w and where she was at. Stand to Sit: Minimal Assistance. verbal and tactile cues given to orient to recliner and reach back for hand rests. FUNCTIONAL MOBILITY:  Assistive Device: Rolling Walker  Assist Level:  Minimal Assistance and Moderate Assistance. Distance: Several steps to recliner from EOB. Patient required verbal and tactile cues to ambulate and direct walker appropriately to align with recliner. She demo weakness and difficulty following simple step directions. Activity Tolerance:  Patient tolerance of  treatment: fair. Patient was cooperative however demo significant confusion and visual deficits impairing her ability to follow commands and comprehend directions and tasks. Assessment:  Assessment: Patient presents with confusion secondary to craniotomy operation and drainage on 5/11/22. Patient demo decreased activity tolerance, functional mobility, balance, strength, vision, cognition and ADL status. She experiences significant confusion impacting her awareness of her surroundings and individuals in her setting as she often asked for family members. Patient would benefit from continued skilled OT to increase safety with functional mobility, activity tolerance, ADL status, balance, strength and cognition and decrease further health risks and caregiver burden to return to PLOF. Performance deficits / Impairments: Decreased functional mobility ,Decreased cognition,Decreased safe awareness,Decreased vision/visual deficit,Decreased ADL status,Decreased endurance,Decreased balance,Decreased strength  Prognosis: Fair  REQUIRES OT FOLLOW-UP: Yes  Decision Making: Medium Complexity    Treatment Initiated: Treatment and education initiated within context of evaluation.   Evaluation time included review of current medical information, gathering information related to past medical, social and functional history, completion of standardized testing, formal and informal observation of tasks, assessment of data and development of plan of care and goals. Treatment time included skilled education and facilitation of tasks to increase safety and independence with ADL's for improved functional independence and quality of life. Discharge Recommendations:  Continue to assess pending progress,Patient would benefit from continued therapy after discharge,24 hour supervision or assist,Subacute/Skilled Nursing [id number] with OT    Patient Education:     Patient Education  Education Given To: Patient  Education Provided: Role of Therapy,Plan of [address] Prevention Strategies,Low Vision Education  Barriers to Learning: Cognition,Vision    Equipment Recommendations:  Equipment Needed: Yes  Mobility Devices: ADL Assistive Devices  Other: continue to assess    Plan:  Times per Week: 5x  Times per Day: Daily  Current Treatment Recommendations: Strengthening,Functional mobility training,Endurance training,Equipment evaluation, education, & procurement,Patient/Caregiver education & training,Safety education & training,Cognitive reorientation,Self-Care / ADL,Balance training,Cognitive/Perceptual training,Neuromuscular re-education. See long-term goal time frame for expected duration of plan of care. If no long-term goals established, a short length of stay is anticipated. Goals:  Patient goals : return home  Short Term Goals  Time Frame for Short term goals: upon discharge  Short Term Goal 1: Patient will complete static standing for 3 minutes with CGA to increase ease with grooming and dressing.   Short Term Goal 2: Patient will learn visual compensatory techniques with SBA and 1-2 verbal cues to increase ease with self care tasks.  Short Term Goal 3: Patient will complete UB ADL with CGA and LB ADL with MIN A and AE prn. Short Term Goal 4: Patient will complete functional ambulation short distances to increase ease with toileting. Following session, patient left in safe position with all fall risk precautions in place.

## 2022-05-25 NOTE — PROGRESS NOTES
Consult received, chart reviewed, await PM&R consult and therapy evaluations for further determination of patient needs.

## 2022-05-25 NOTE — PROGRESS NOTES
[address] [id number]  INPATIENT PHYSICAL THERAPY  EVALUATION  [facility] 4A - [id number]    Time In: 3531  Time Out: 1146  Timed Code Treatment Minutes: 27 Minutes  Minutes: 37          Date: 2022  Patient Name: Prabhjot Faustin,  Gender:  female        MRN: [id number]  : 1933  (80 y.o.)      Referring Practitioner: Ladonna Mruo MD  Diagnosis: confusion  Additional Pertinent Hx: Per H&P : The patient is an 29-year-old female, whohad a known history of metastatic breast cancer, metastasized to theliver and also to the colon, and to the brain. The patient had adebulking of recurrent cancer of the breast and to the head by Sharee Rm, was re-operated on 2022. The patient subsequentlydeveloped a subgaleal fluid on the right parietal head where theincision is and sutures are, and has been aspirated twice during herlast hospitalization. The patient subsequently was discharged this pastFriday, 2022 and I have seen the patient that time and the patienthad a significant swelling on the right parietal head, the swelling isvery tense; however, it is not infected. The patient was discharged onthat day and at home, she was attended by the home health nurse,noticing more fluids coming out from the suture lines and the patientseems to be more confused. The patient was seen in the emergency roomand was noted to have a hyponatremia with the sodium of 124 for whichthe patient was hence admitted. Restrictions/Precautions:  Restrictions/Precautions: Fall Risk,General Precautions  Position Activity Restriction  Other position/activity restrictions: visual impairments, hx craniotomy 22    Subjective:  Chart Reviewed: Yes  Patient assessed for rehabilitation services?: Yes  Family / Caregiver Present: No  Subjective: RN approved session. Pt pleasantly agrees for PT evaluation. Noted to have some confusion, asking about her family repetitively.  Pt states no concern with d/c home, this PT did discuss a therapy stay with the pt. Max cues for safety and orientation to task, and much time for hygine and clean up following pt being incontinent of urine. Pt required set up assistance and cues for orientation with her lunch tray at end of session. General:  Overall Orientation Status: Within Functional Limits  Orientation Level: Oriented to place,Oriented to time,Oriented to situation,Oriented to person  Cognition Comment: confusion  Overall Cognitive Status: Exceptions  Vision: Impaired  Vision Exceptions: Visual field cut (L eye blind, R eye visual impairments)  Hearing: Exceptions to [facility]  Hearing Exceptions: Hard of hearing/hearing concerns     Pain: 0/10: denies     Vitals: Oxygen: 92% baseline, attempted to read 2 more times, however, much difficulty getting a reading on pulse ox, pt not noted not to be SOB   Heart Rate: 60's baseline    *Pt on 2 L O2, nasal canula noted to not be in her nose upon this therapist's entry to her room. Cues to prompt donning this provided with SpO2 WFL following this     Social/Functional History:    Lives With: Alone  Type of Home: House  Home Layout: One level  Home Access: Level entry  Home Equipment: Cane,Walker, rolling     Bathroom Shower/Tub: Tub/Shower unit  Bathroom Toilet: Handicap height  Bathroom Equipment: Shower chair  Bathroom Accessibility: Accessible    Receives Help From: Family  ADL Assistance: Independent  Homemaking Assistance: Independent  Homemaking Responsibilities: No  Ambulation Assistance: Needs assistance  Transfer Assistance: Needs assistance    Active : No  Occupation: Retired  Additional Comments: Pt states her niece assisted with ambulation with RW at home since previous admission. States 24/7 assist at home, however, mentions her niece has back problems. Per RN, pt will likely not have 24/7 asssist at home.     OBJECTIVE:  Range of Motion:  Bilateral Lower Extremity: WFL    Strength:  Bilateral Lower Extremity: [facility]   *Not specifically tested, however, noted to be a min of 3/5 with functional mobility     Balance:  Static Sitting Balance:  Stand By Assistance  Dynamic Sitting Balance: Stand By Assistance  Static Standing Balance: Minimal Assistance, Moderate Assistance   *Pt required min To mod assist to support stance at RW with tactile cues at the pelvis and trunk for improved posture, and manual hand over hand assist to bring B UE to the RW and repetitive assist required to maintain safe grasp of RW. Cues and min to mod assist required to support as pt noted to begin to sit impulsively. *Pt required assist to don a brief, and for hygiene following incontinence     Bed Mobility:  Not Tested    Transfers:  Sit to Stand: Minimal Assistance, with increased time for completion, cues for hand placement  Stand to Sit:Minimal Assistance, with increased time for completion, cues for hand placement  Pt required min assist for force production, for improved posture and for grasping RW. Cues To fully align with the chair and min A provided at the RW to ensure aligned. Ambulation:  Minimal Assistance, Moderate Assistance, with cues for safety, with increased time for completion  Distance: ~4' fwd, 4' retro x2 trials  Surface: Level Tile  Device:Rolling Walker  Gait Deviations: Forward Flexed Posture, Slow Gillian, Decreased Step Length Bilaterally, Decreased Heel Strike Bilaterally, Wide Base of Support, Moderate Path Deviations, Unsteady Gait and Decreased Terminal Knee Extension    *Pt required MAX cues for sequencing, to orient to task, and min to mod assist for good grasp on RW with B UE. Pt required min to mod assist for RW progression, cues for path finding as she has visual impairments, and support for her gait deviations for stability. Pt noted to have fwd flexion, decreased stop length, and  path deviations.      Seated rest break provided between trials    Exercise:  None this session     Functional Outcome Measures: Completed  -PAC Inpatient Mobility without Stair Climbing Raw Score : 14  AM-PAC Inpatient without Stair Climbing T-Scale Score : 40.85    ASSESSMENT:  Activity Tolerance:  Patient tolerance of  treatment: fair. Pt with confusion, lack of safety awareness, visual impairments and some impulsivity during elevation. Pt required max verbal and manual cues along with min to moderate assist with majority of mobility for safety. Pt is not safe to be without 24/7 hands on assistance. Pt unsafe to be home alone. Treatment Initiated: Treatment and education initiated within context of evaluation. Evaluation time included review of current medical information, gathering information related to past medical, social and functional history, completion of standardized testing, formal and informal observation of tasks, assessment of data and development of plan of care and goals. Treatment time included skilled education and facilitation of tasks to increase safety and independence with functional mobility for improved independence and quality of life. Assessment: Body Structures, Functions, Activity Limitations Requiring Skilled Therapeutic Intervention: Decreased functional mobility ,Decreased endurance,Decreased safe awareness,Decreased balance,Decreased posture,Decreased coordination  Assessment: This patient is a 80 y.o. who presents with confusion. This is a decline from the patient's baseline status of ambulating with RW and assistance. The patient is observed to have deficits in strength, balance, activity tolerance, and safety awareness and would benefit from skilled PT services to progress functional mobility, safety awareness, and to decrease overall risk of falls. PT recommending 24/7 hands on assistance, use of RW, and continued PT as pt with signficant safety impairments and high risk of falls.      Therapy Prognosis: Good    Requires PT Follow-Up: Yes    Discharge Recommendations:  Discharge Recommendations: Continue to assess pending progress,24 hour supervision or assist,IP Rehab,Therapy recommended at discharge  Patient is exhibiting above listed deficits and requiring continued therapy. Patient would benefit from continued therapy on an inpatient rehab unit. Patient is able to tolerate 3 hours of intensive therapy 5-6 days/week. Without continued therapies pt at risk for functional decline, increased falls, and readmission to hospital.     Patient Education:      . Patient Education  Education Given To: Patient  Education Method: Demonstration,Verbal  Barriers to Learning: Vision  Education Outcome: Continued education needed       Equipment Recommendations:  Equipment Needed: No (pt has a RW, continue to assess needs)    Plan:  Current Treatment Recommendations: Strengthening,Balance training,Functional mobility training,Transfer training,Gait training,Endurance training,Neuromuscular re-education,Home exercise program,Safety education & training,Patient/Caregiver education & training,Equipment evaluation, education, & procurement,Therapeutic activities  Plan:  (6x N)    Goals:  Patient goals : \"no\"  Short Term Goals  Time Frame for Short term goals: by hospital d/c  Short term goal 1: Pt to demo supine <-> sit with S for ability to get in and out of bed easily  Short term goal 2: Pt to complete sit <->stand with RW with SBA for improved safety with getting up from surfaces  Short term goal 3: Pt to ambulate >=20' with RW and CGA for improved ability to move safely in her environment  Long Term Goals  Time Frame for Long term goals : NA due to short ELOS    Following session, patient left in safe position with all fall risk precautions in place.

## 2022-05-25 NOTE — CONSULTS
Physical Medicine & Rehabilitation   Consult Note      Admitting Physician: Alfonzo Jay MD    Primary Care Provider: Alfonzo Jay MD     Reason for Consult:  Breast Cancer with metasatic disease to brain, liver, and colon; Confusion, and seepage of incision on right parietal area; Rehab needs    History of Present Illness:  Scott Huang is a 80 y.o. female admitted to [facility] on 2022. The patient had a  debulking of recurrent (primary cancer of the breast) metastatic disease to the head by Dr. Danielle Jose, re-operated on 2022. The patient subsequently developed a subgaleal fluid on the right parietal head where the incision and sutures are, and it has been aspirated twice during her last hospitalization. The patient subsequently was discharged to home this past  Friday, 2022 and was seen per Dr. Roderick Ritter at that time and the patient had a significant swelling on the right parietal head, the swelling is very tense; however, it was not infected. She was seen by the home health nurse who noticed more fluids coming out from the suture lines and the patient  seemed to be more confused. The patient was therefore seen in the emergency room and was noted to have a hyponatremia with the sodium of 124 for which the patient was hence admitted.     Patient is well known to me as she was on my Inpatient Rehab service from 21/thru discharge to home on 21. Patient wants to go home. Spoke to her brother Eli Brar who with his daughters, plans to provide  care for Latanya at her home. Current Rehabilitation Assessments:  PT:      Date: 2022  Patient Name: Scott Huang        MRN:  [id number]   : 1933  (80 y.o.)  Gender: female         REASON FOR MISSED TREATMENT:  Hold treatment per nursing request.  Pt having low BPs this morning (78/66). Will check back as able.       OT:      COGNITION: Slow Processing, Decreased Recall, Impaired Memory, Decreased Problem Solving, Decreased Safety Awareness and Difficulty Following Commands     RANGE OF MOTION:  Bilateral Upper Extremity:  WFL     STRENGTH:  Bilateral Upper Extremity:  generalized weakness and deconditioning.     SENSATION:   WFL     ADL:   Grooming: Moderate Assistance. patient sat EOB and lifted UE while writer applied deodorant  Bathing: Moderate Assistance. patient sat EOD to wash UB, vision impacted ability to complete, writer assisted with UB. patient stood with 2w/w and required MOD A. to [facility] and wash dc area  Upper Extremity Dressing: Minimal Assistance. to [facility] gown. .     BALANCE:  Sitting Balance:  Stand By Assistance. to sit EOB. patient had difficulty maintaining upright position and demo decreased trunk strength. Standing Balance: Contact Guard Assistance. <> MIN A. patient stood with 2w/w and demo anterior leaning posture with WB through UE. she required verbal and tactile cues to maintain upright posture. patient demo weaknes.     BED MOBILITY:  Supine to Sit: Minimal Assistance to sit EOB. verbal, tactile and hand over hand cues given to move legs to EOB and use UE to pull self up, assist to lower legs onto floor and raise UB.     TRANSFERS:  Sit to Stand: Moderate Assistance. to stand from EOB. verbal, tactile and hand over hand cues given to push off bed and locate 2w/w as patient had difficulty viewing the 2w/w and where she was at. Stand to Sit: Minimal Assistance. verbal and tactile cues given to orient to recliner and reach back for hand rests.      FUNCTIONAL MOBILITY:  Assistive Device: Rolling Walker  Assist Level:  Minimal Assistance and Moderate Assistance. Distance: Several steps to recliner from EOB.     Patient required verbal and tactile cues to ambulate and direct walker appropriately to align with recliner. She demo weakness and difficulty following simple step directions.       Activity Tolerance:  Patient tolerance of  treatment: fair.  Patient was cooperative however demo significant confusion and visual deficits impairing her ability to follow commands and comprehend directions and tasks.        Assessment:  Assessment: Patient presents with confusion secondary to craniotomy operation and drainage on 5/11/22. Patient demo decreased activity tolerance, functional mobility, balance, strength, vision, cognition and ADL status. She experiences significant confusion impacting her awareness of her surroundings and individuals in her setting as she often asked for family members. Patient would benefit from continued skilled OT to increase safety with functional mobility, activity tolerance, ADL status, balance, strength and cognition and decrease further health risks and caregiver burden to return to [facility]. Performance deficits / Impairments: Decreased functional mobility ,Decreased cognition,Decreased safe awareness,Decreased vision/visual deficit,Decreased ADL status,Decreased endurance,Decreased balance,Decreased strength  Prognosis: Fair  REQUIRES OT FOLLOW-UP: Yes  Decision Making: Medium Complexity     Treatment Initiated: Treatment and education initiated within context of evaluation. Evaluation time included review of current medical information, gathering information related to past medical, social and functional history, completion of standardized testing, formal and informal observation of tasks, assessment of data and development of plan of care and goals.   Treatment time included skilled education and facilitation of tasks to increase safety and independence with ADL's for improved functional independence and quality of life.     Discharge Recommendations:  Continue to assess pending progress,Patient would benefit from continued therapy after discharge,24 hour supervision or assist,Subacute/Skilled Nursing [id number] with OT         Past Medical History:        Diagnosis Date    Anxiety     Blind right eye     Breast cancer ([facility] [address] 05/23/2016    [facility] only chemo, no surgery    Breast cancer metastasized to liver (5/16) and brain (7/21) ([address] 05/10/2016    Liver lesion noted 5/16 : Brain 7/21    Carotid stenosis      Left ICA 25%    Colostomy in place [facility]) 05/27/2016    Degenerative arthritis of cervical spine 05/2007    GERD (gastroesophageal reflux disease) 11/2006    History of craniotomy 07/2021    mets from breast CA    Hx antineoplastic chemo 2016    left breast cancer, no surgery    Hypercholesteremia     Hypoestrogenism     Hypothyroidism     Lumbago     Lumbosacral spinal stenosis 05/2019    MDRO (multiple drug resistant organisms) resistance 2008    pt stated cleared    Metastasis ([address] 2019    from breast to brain    Personal history of cardiac dysrhythmia     Respiratory tract infection due to COVID-19 virus 01/21/2022    Sigmoid diverticulosis 08/2004    Spondylolisthesis of lumbosacral region 08/2004    Steroid-induced hyperglycemia     Subclavian artery stenosis (HCC)     left    Superior and Inferior Pubic ramus fracture, right, closed, initial encounter ([address] 05/21/2019    SVT (supraventricular tachycardia) ([address] 06/2007    Temporal arteritis ([address]     Vertebral artery occlusion     left    Vitamin D deficiency 06/2017       Past Surgical History:        Procedure Laterality Date    CARDIAC CATHETERIZATION  5/07    CATARACT REMOVAL  right 1/08; left 2/08    CHOLECYSTECTOMY  1/03    COLONOSCOPY  11/06    COLOSTOMY  09/04/2018    REVERSAL OF COLOSTOMY    CRANIOTOMY Right 7/2/2021    CRANIOTOMY FOR RESECTION/DEBULKING OF RIGHT SIDE INTRA BRAIN TUMOR performed by Natasha Gill MD at [address] Right 5/11/2022    RIGHT SIDED CRANIOTOMY FOR TUMOR performed by Natasha Gill MD at [facility]      ENDOSCOPY, COLON, DIAGNOSTIC      EYE REMOVAL Right 03/2017    EYE SURGERY      EYE SURGERY Right 11/06/2017    Dr BELTRAN [facility] in [address] partial    OTHER SURGICAL HISTORY  05/27/2016    robotic assisted laparoscopic anterior colon resection and end colostomy    CO OFFICE/OUTPT VISIT,PROCEDURE ONLY N/A 9/4/2018    COLOSTOMY REVERSAL AND PARASTOMAL HERNIA REPAIR performed by Ramos Payan MD at [address] PULMONARY SHUNT  2002    UPPER GASTROINTESTINAL ENDOSCOPY  11/06    US BREAST NEEDLE BIOPSY LEFT Left 05/23/2016    ID[facility]       Allergies:     Allergies   Allergen Reactions    Bactrim [Sulfamethoxazole-Trimethoprim] Swelling     Of tongue    Demerol Rash     nausea    Pcn [Penicillins] Rash        Current Medications:   Current Facility-Administered Medications: ciprofloxacin (CILOXAN) 0.3 % ophthalmic solution 1 drop, 1 drop, Both Eyes, Q2H While awake  cefTRIAXone (ROCEPHIN) 1,000 mg in sterile water 10 mL IV syringe, 1,000 mg, IntraVENous, BID  albuterol sulfate  (90 Base) MCG/ACT inhaler 2 puff, 2 puff, Inhalation, Q6H PRN  amiodarone (CORDARONE) tablet 200 mg, 200 mg, Oral, Daily  isosorbide mononitrate (IMDUR) extended release tablet 30 mg, 30 mg, Oral, Daily  levETIRAcetam (KEPPRA) tablet 500 mg, 500 mg, Oral, BID  levothyroxine (SYNTHROID) tablet 100 mcg, 100 mcg, Oral, Daily  cetirizine (ZYRTEC) tablet 10 mg, 10 mg, Oral, Daily  metoprolol tartrate (LOPRESSOR) tablet 12.5 mg, 12.5 mg, Oral, BID  multivitamin 1 tablet, 1 tablet, Oral, Daily  pantoprazole (PROTONIX) tablet 40 mg, 40 mg, Oral, QAM AC  predniSONE (DELTASONE) tablet 1 mg, 1 mg, Oral, Daily  sertraline (ZOLOFT) tablet 50 mg, 50 mg, Oral, Daily  traZODone (DESYREL) tablet 50 mg, 50 mg, Oral, Nightly  sodium chloride flush 0.9 % injection 5-40 mL, 5-40 mL, IntraVENous, 2 times per day  sodium chloride flush 0.9 % injection 5-40 mL, 5-40 mL, IntraVENous, PRN  0.9 % sodium chloride infusion, , IntraVENous, PRN  ondansetron (ZOFRAN-ODT) disintegrating tablet 4 mg, 4 mg, Oral, Q8H PRN **OR** ondansetron (ZOFRAN) injection 4 mg, 4 mg, IntraVENous, Q6H PRN  polyethylene glycol (GLYCOLAX) packet 17 g, 17 g, Oral, Daily PRN  acetaminophen (TYLENOL) tablet 650 mg, 650 mg, Oral, Q6H PRN **OR** acetaminophen (TYLENOL) suppository 650 mg, 650 mg, Rectal, Q6H PRN  mupirocin (BACTROBAN) 2 % ointment, , Topical, Daily  HYDROcodone-acetaminophen (NORCO) 7.5-325 MG per tablet 1 tablet, 1 tablet, Oral, Q6H PRN    Social History:  Social History     Socioeconomic History    Marital status:      Spouse name: Not on file    Number of children: 0    Years of education: 10th grade     Highest education level: Not on file   Occupational History    Not on file   Tobacco Use    Smoking status: Former Smoker     Packs/day: 0.50     Types: Cigarettes    Smokeless tobacco: Never Used   Vaping Use    Vaping Use: Former    Substances: Always   Substance and Sexual Activity    Alcohol use: No    Drug use: No    Sexual activity: Never   Other Topics Concern    Not on file   Social History Narrative    Not on file     Social Determinants of Health     Financial Resource Strain: Low Risk     Difficulty of Paying Living Expenses: Not hard at all   Food Insecurity: No Food Insecurity    Worried About [address] in the Last Year: Never true    [address] [facility] [address] in the Last Year: Never true   Transportation Needs:     Lack of Transportation (Medical): Not on file    Lack of Transportation (Non-Medical):  Not on file   Physical Activity:     Days of Exercise per Week: Not on file    Minutes of Exercise per Session: Not on file   Stress:     Feeling of Stress : Not on file   Social Connections:     Frequency of Communication with Friends and Family: Not on file    Frequency of Social Gatherings with Friends and Family: Not on file    Attends Religion Services: Not on file    Active Member of Clubs or Organizations: Not on file    Attends Club or Organization Meetings: Not on file    Marital Status: Not on file   Intimate Partner Violence:     Fear of Current or Ex-Partner: Not on file    Emotionally Abused: Not on file    Physically Abused: Not on file    Sexually Abused: Not on file   Housing Stability:     Unable to Pay for Housing in the Last Year: Not on file    Number of Places Lived in the Last Year: Not on file    Unstable Housing in the Last Year: Not on file   Lives With: Family  Type of Home: House  Home Layout: One level  Home Access: Level entry  [address]: Cane   Bathroom Shower/Tub: Tub/Shower unit  Bathroom Toilet: Handicap height  Bathroom Equipment: Shower chair  Bathroom Accessibility: [id number] From: Family  ADL Assistance: [address]: Independent  Homemaking Responsibilities: No  Ambulation Assistance: Independent  Transfer Assistance: Independent     Active : No  Patient's  Info: family  Occupation: Retired  Additional Comments: accuracy of history obtained unknow due to patients confusion.     Natacha Soler, Latanya's niece/POA and Bora Rush is her brother. Also met Latanya's niece Neal Gates, who is a daughter of Latanya's sister [place].     Family History:       Problem Relation Age of Onset    Breast Cancer Sister 61        breast    Cancer Brother 79        lung    Cancer Brother 68        colon    Cancer Son 48        lung       Review of Systems:  CONSTITUTIONAL:  positive for  fatigue  EYES:  positive for  Was blind in her right eye s  2/2 temporal arteritis and now not able to see with left eye following this surgery  HEENT:  positive for headache and bilateral blindnesss  RESPIRATORY:  Currently on 2L supplemental O2 per NC  CARDIOVASCULAR:  negative  GASTROINTESTINAL:  negative  GENITOURINARY:  negative  SKIN:  negative  HEMATOLOGIC/LYMPHATIC:  positive for Breast cancer with metastatic disease to liver, colon, and brain x 2  MUSCULOSKELETAL:  positive for  myalgias, arthralgias, pain and muscle weakness  NEUROLOGICAL:  positive for visual disturbance, gait problems and weakness  BEHAVIOR/PSYCH:  positive for decreased energy level  System review otherwise negative    Physical Exam:  BP (!) 145/47   Pulse 59   Temp 98.3 °F (36.8 °C) (Oral)   Resp 18   Wt 146 lb 9.6 oz (66.5 kg)   LMP  (LMP Unknown) Comment: age 52 ovaries intact  SpO2 97%   BMI 28.63 kg/m²   awake  Orientation:   person, place, time  Mood: within normal limits  Affect: quiet and spontaneous  General appearance: well groomed and in no acute distress    Memory:   normal,   Attention/Concentration: normal  Language:  normal    Cranial Nerves:  Bilateral blindness  ROM:  normal  Motor Exam:  Motor exam is symmetrical 54+ut of 5 all extremities bilaterally  Tone:  normal  Muscle bulk: within normal limits  Sensory:  Sensory intact  Coordination:   normal  Plantar Response:  Right:  upgoing  Left:  upgoing  Gait: not tested    Heart: normal rate, regular rhythm, normal S1, S2, no murmurs, rubs, clicks or gallops  Lungs: clear to auscultation without wheezes or rales  Abdomen: soft, non-tender, non-distended, normal bowel sounds, no masses or organomegaly    Skin: warm and dry  Peripheral vascular: Pulses: Normal upper and lower extremity pulses; Edema: no      Diagnostics:  Recent Results (from the past 24 hour(s))   Basic Metabolic Panel    Collection Time: 05/25/22  4:33 AM   Result Value Ref Range    Sodium 133 (L) 135 - 145 meq/L    Potassium 4.2 3.5 - 5.2 meq/L    Chloride 101 98 - 111 meq/L    CO2 25 23 - 33 meq/L    Glucose 89 70 - 108 mg/dL    BUN 9 7 - 22 mg/dL    CREATININE 0.5 0.4 - 1.2 mg/dL    Calcium 7.5 (L) 8.5 - 10.5 mg/dL   Anion Gap    Collection Time: 05/25/22  4:33 AM   Result Value Ref Range    Anion Gap 7.0 (L) 8.0 - 16.0 meq/L   Glomerular Filtration Rate, Estimated    Collection Time: 05/25/22  4:33 AM   Result Value Ref Range    Est, Glom Filt Rate >90 ml/min/1.73m2           Impression:  · Breast Cancer  · Metastatic disease to brain x 2, liver, and colon  · S/p debulking of recurrent metastatic cancer to brain per Dr. 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  · Chronic atrial fibrillation  · Coronary artery disease involving native heart without angina  · Essential hypertension  · Left carotid artery stenosis  · Hyperllipidemia  · GERD  · Hypothyroidism   · Vitamin D deficiency  · CODE STATUS is DNR CCA  · Degenerative arthritis of cervical spine  · Hypoestrogenism  · Lumbosacral spinal stenosis/spondylolisthesis  · Multiple drug-resistant organisms  · Respiratory tract infection due to COVID-19  · Sigmoid diverticulosis  · Steroid-induced hyperglycemia  · Subclavian artery stenosis  · Superior and inferior pubic ramus fracture, right, closed  · Supraventricular tachycardia  · Vertebral artery occlusion      Recommendations:  · Await therapy evaluations from PT and ST  · Hoping patient will be able to tolerate 3 hours of therapy per day to improve her mobility, ADL's, and cognition prior to returning home with family  · She also stated that she doesn't want to come back to the hospital after this stay; \"I have had enough! \"    I called patient's brother Maria L Fernández) to discuss Latanya's case and care; He was adamant that he and his daughter(s) would be providing 24/7 care for her when she goes home    It was my pleasure to evaluate Latanya [place] today. Please call with questions.     Darrel Razo MD

## 2022-05-25 NOTE — PROGRESS NOTES
Family Medicine Progress Notes/Coverage    Today's Date: 5/25/22  [id number]/[id number]-A  Medical Record # [id number]  CSN/Account # [de-identified]      Ms. Rod admitted on 5/23/2022        Subjective / Interval History :     Hypotension yesterday but better now  More oriented  Eye drainage better  Scalp drainage is better edema  Reviewed notes, consults, laboratory and radiology results,    Objective:       Physical Exam:  Patient Vitals for the past 24 hrs:   BP Temp Temp src Pulse Resp SpO2 Weight   05/25/22 0400       146 lb 9.6 oz (66.5 kg)   05/25/22 0357 131/74 97.8 °F (36.6 °C) Oral 64 18 95 %    05/24/22 2335 (!) 132/49 97.3 °F (36.3 °C) Oral 62 18 95 %    05/24/22 2024 (!) 154/58 98 °F (36.7 °C) Oral 67 16 99 %    05/24/22 1531 111/69 98.6 °F (37 °C) Oral 63 16 99 %    05/24/22 1143 (!) 101/45 97.7 °F (36.5 °C) Oral 63 18 100 %    05/24/22 0756 (!) 78/66 98.1 °F (36.7 °C) Oral 61 16 93 %        General Appearance:  Alert, cooperative, no distress, appears stated age   HEENT:  Neck:      Chest/Lungs:  Heart: CTA  IRRR   Abdomen:  Back: soft   Extremities:  Neurological Exam: No             Assessment:     Admitting Diagnosis:    Confusion [R41.0]  Intractable headache, unspecified chronicity pattern, unspecified headache type [R51.9]    Active Hospital Problems    Diagnosis Date Noted    Chronic atrial fibrillation ([facility] [address] [I48.20] 05/24/2022     Priority: High    Breast 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 ([facility] [address] [C50.919, C79.31] 05/24/2022     Priority: High    Confusion [R41.0] 05/23/2022     Priority: High    Brain metastases [facility] [C79.31]      Priority: High    Status post craniotomy [Z98.890] 07/02/2021     Priority: High    Essential hypertension [I10]      Priority: Medium    History of craniotomy [Z98.890] 07/2021     Priority: Medium    Hx of breast cancer with liver mets [Z85.3] 08/13/2020     Priority: Medium    Current chronic use of systemic steroids [Z79.52] 09/04/2018     Priority: Medium    Coronary artery disease involving native heart without angina pectoris [I25.10] 11/06/2017     Priority: Medium    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]      Priority: Medium    [facility] [C50.919, C78.7] 05/10/2016     Priority: Medium    Hypothyroidism due to acquired atrophy of thyroid [E03.4] 03/03/2016     Priority: Medium    Temporal arteritis ([facility] [address] [M31.6]      Priority: Medium    GERD (gastroesophageal reflux disease) [K21.9]      Priority: Medium    Hypothyroidism [E03.9]      Priority: Medium       Plan:     Discussed plans with the nursing staff  PT/OT   home in AM    Medications, Laboratories and Imaging results:    Scheduled Meds:   ciprofloxacin  1 drop Both Eyes Q2H While awake    cefTRIAXone (ROCEPHIN) IV  1,000 mg IntraVENous BID    amiodarone  200 mg Oral Daily    isosorbide mononitrate  30 mg Oral Daily    levETIRAcetam  500 mg Oral BID    levothyroxine  100 mcg Oral Daily    cetirizine  10 mg Oral Daily    metoprolol tartrate  12.5 mg Oral BID    multivitamin  1 tablet Oral Daily    pantoprazole  40 mg Oral QAM AC    predniSONE  1 mg Oral Daily    sertraline  50 mg Oral Daily    traZODone  50 mg Oral Nightly    sodium chloride flush  5-40 mL IntraVENous 2 times per day    mupirocin   Topical Daily     Continuous Infusions:   sodium chloride      sodium chloride 100 mL/hr at 05/25/22 0401     PRN Meds:albuterol sulfate HFA, sodium chloride flush, sodium chloride, ondansetron **OR** ondansetron, polyethylene glycol, acetaminophen **OR** acetaminophen, HYDROcodone-acetaminophen    Imaging:    Lab Review :    Lab Results   Component Value Date    WBC 9.8 05/23/2022    HGB 10.9 (L) 05/23/2022    HCT 34.6 (L) 05/23/2022    MCV 89.6 05/23/2022     05/23/2022     Lab Results   Component Value Date CREATININE 0.5 05/25/2022    BUN 9 05/25/2022     (L) 05/25/2022    K 4.2 05/25/2022     05/25/2022    CO2 25 05/25/2022     Lab Results   Component Value Date    CKTOTAL 70 06/28/2021     Lab Results   Component Value Date    ALT 63 05/23/2022    AST 85 (H) 05/23/2022    ALKPHOS 42 05/23/2022    BILITOT 0.4 05/23/2022     Lab Results   Component Value Date    LIPASE 20.4 05/06/2022         Electronically signed by Linh Killian MD on 5/25/22 at 7:20 AM AMELIA Treviño M.D.

## 2022-05-25 NOTE — PLAN OF CARE
Problem: Discharge Planning  Goal: Discharge to home or other facility with appropriate resources  Note: Possible dc to IPR      Problem: Pain  Goal: Verbalizes/displays adequate comfort level or baseline comfort level  Flowsheets (Taken 5/25/2022 0865)  Verbalizes/displays adequate comfort level or baseline comfort level: Encourage patient to monitor pain and request assistance  Note: Patient able to verbalize pain on a scale of 0-10. PRN meds available and appropriate pain level established. Problem: ABCDS Injury Assessment  Goal: Absence of physical injury  Note: Pt up with 1 assist and a walker      Problem: Skin/Tissue Integrity  Goal: Absence of new skin breakdown  Description: 1. Monitor for areas of redness and/or skin breakdown  2. Assess vascular access sites hourly  3. Every 4-6 hours minimum:  Change oxygen saturation probe site  4. Every 4-6 hours:  If on nasal continuous positive airway pressure, respiratory therapy assess nares and determine need for appliance change or resting period. Note: Pt has no skin breakdown      Problem: Neurosensory - Adult  Goal: Achieves stable or improved neurological status  Recent Flowsheet Documentation  Taken 5/25/2022 0902 by Lisset Nixon RN  Achieves stable or improved neurological status: Assess for and report changes in neurological status     Problem: Neurosensory - Adult  Goal: Absence of seizures  Recent Flowsheet Documentation  Taken 5/25/2022 0902 by Lisset Nixon RN  Absence of seizures: Monitor for seizure activity.   If seizure occurs, document type and location of movements and any associated apnea     Problem: Neurosensory - Adult  Goal: Achieves maximal functionality and self care  Recent Flowsheet Documentation  Taken 5/25/2022 0902 by Lisset Nixon RN  Achieves maximal functionality and self care: Encourage and assist patient to increase activity and self care with guidance from physical therapy/occupational therapy     Problem: Cardiovascular - Adult  Goal: Maintains optimal cardiac output and hemodynamic stability  Recent Flowsheet Documentation  Taken 5/25/2022 0902 by Amirah Atkinson RN  Maintains optimal cardiac output and hemodynamic stability: Monitor blood pressure and heart rate   Care plan reviewed with patient . Patient  verbalize understanding of the plan of care and contribute to goal setting.

## 2022-05-25 NOTE — PLAN OF CARE
Patient educated on and encouraged to use the call light for assistance from staff with needs. Patient verbalizes an understanding of this. Bed wheels locked and bed in lowest position; bed alarm on. Patient possessions within reach; pathways clear at this time. Problem: Skin/Tissue Integrity  Goal: Absence of new skin breakdown  Description: 1. Monitor for areas of redness and/or skin breakdown  2. Assess vascular access sites hourly  3. Every 4-6 hours minimum:  Change oxygen saturation probe site  4. Every 4-6 hours:  If on nasal continuous positive airway pressure, respiratory therapy assess nares and determine need for appliance change or resting period. 5/24/2022 2237 by Day Love, RN  Outcome: Progressing  Note: No signs and/or symptoms of infection noted during this shift. Patient afebrile during this shift. No new skin breakdown noted during this shift. Patient turned every two hours with pillow support utilized. Bilat heels floated off bed; SCDs in place. Problem: Neurosensory - Adult  Goal: Achieves stable or improved neurological status  Outcome: Progressing  Flowsheets (Taken 5/24/2022 2237)  Achieves stable or improved neurological status: Assess for and report changes in neurological status  Note: Patient orientation waxes/wanes. Problem: Neurosensory - Adult  Goal: Absence of seizures  Outcome: Progressing  Flowsheets (Taken 5/24/2022 2237)  Absence of seizures:   Monitor for seizure activity. If seizure occurs, document type and location of movements and any associated apnea   If seizure occurs, turn head to side and suction secretions as needed   Administer anticonvulsants as ordered   Support airway/breathing, administer oxygen as needed  Note: Patient on Keppra PO for prophylaxis.      Problem: Neurosensory - Adult  Goal: Achieves maximal functionality and self care  Outcome: Progressing  Flowsheets (Taken 5/24/2022 2237)  Achieves maximal functionality and self care: Monitor swallowing and airway patency with patient fatigue and changes in neurological status   Encourage and assist patient to increase activity and self care with guidance from physical therapy/occupational therapy  Note: Patient to work with therapies to assist in achieving maximum potential.     Problem: Cardiovascular - Adult  Goal: Maintains optimal cardiac output and hemodynamic stability  Outcome: Progressing  Flowsheets (Taken 5/24/2022 2237)  Maintains optimal cardiac output and hemodynamic stability: Monitor blood pressure and heart rate  Note:   Vitals:    05/24/22 0756 05/24/22 1143 05/24/22 1531 05/24/22 2024   BP: (!) 78/66 (!) 101/45 111/69 (!) 154/58   Pulse: 61 63 63 67   Resp: 16 18 16 16   Temp: 98.1 °F (36.7 °C) 97.7 °F (36.5 °C) 98.6 °F (37 °C) 98 °F (36.7 °C)   TempSrc: Oral Oral Oral Oral   SpO2: 93% 100% 99% 99%        Problem: Musculoskeletal - Adult  Goal: Return mobility to safest level of function  Outcome: Progressing  Flowsheets (Taken 5/24/2022 2237)  Return Mobility to Safest Level of Function:   Assess patient stability and activity tolerance for standing, transferring and ambulating with or without assistive devices   Assist with transfers and ambulation using safe patient handling equipment as needed   Obtain physical therapy/occupational therapy consults as needed  Note: Patient to work with therapies to assist in achieving maximum potential.     Problem: Musculoskeletal - Adult  Goal: Return ADL status to a safe level of function  Outcome: Progressing  Flowsheets (Taken 5/24/2022 2237)  Return ADL Status to a Safe Level of Function:   Administer medication as ordered   Assess activities of daily living deficits and provide assistive devices as needed   Obtain physical therapy/occupational therapy consults as needed  Note: Patient to work with therapies to assist in achieving maximum potential.     Problem: Infection - Adult  Goal: Absence of infection at discharge  Outcome: Progressing  Flowsheets (Taken 5/24/2022 2237)  Absence of infection at discharge:   Assess and monitor for signs and symptoms of infection   Monitor lab/diagnostic results   Monitor all insertion sites i.e., indwelling lines, tubes and drains   Administer medications as ordered  Note: No s/s of infection noted. Patient remains afebrile during this shift. Patient on IV Rocephin. Problem: Metabolic/Fluid and Electrolytes - Adult  Goal: Electrolytes maintained within normal limits  Outcome: Progressing  Flowsheets (Taken 5/24/2022 2237)  Electrolytes maintained within normal limits:   Monitor labs and assess patient for signs and symptoms of electrolyte imbalances   Administer electrolyte replacement as ordered   Monitor response to electrolyte replacements, including repeat lab results as appropriate  Note: Patient hyponatremic at 127. IVF running at 100 cc/hr. Will continue to monitor. Patient is unable to participate in care planning. Family has been updated and understands.

## 2022-05-25 NOTE — PROGRESS NOTES
NEUROSURGERY PROGRESS NOTE     80 RH lady. Stage IV breast CA DXd in 2016 with liver mets. Brain mets > craniotomy in July 2021. (? ER/MT and UDX9eel status) Craniotomy reportedly followed by chemo. No mention of RT. Presented with right parieto-occipital recurrence. Re-operated on 5/11/2022. Had some subgaleal fluid aspirations post-op. Now presents with headaches and confusion.     BP 80/65  HR 60  T    36.7     Serum Na+ 133     NEURO:  Awake, alert, confused, fairly cooperative  Vision extremely difficult to test.  Motor grossly 5/5        Incision . Small amount of subgaleal fluid not under tension. Decreased but not absent drainage with pressure dressing/ head wrap     Impression/Plan:  Small amount of subgaleal fluid of no concern. Drainage of greater concern.  May need additional sutures if not stopped by pressure dressing.     Zahra Cagle    Neurosurgery

## 2022-05-25 NOTE — CARE COORDINATION
5/25/22, 2:03 PM EDT    DISCHARGE ON GOING EVALUATION    [facility] day: 0  Location: 4A-09/009-A Reason for admit: Confusion [R41.0]  Intractable headache, unspecified chronicity pattern, unspecified headache type [R51.9]   Procedure:   CT Head WO Contrast 1. Postoperative changes involving the right posterior temporal and parietal calvarium. 2. 3.2 x 1.6 cm area of encephalomalacia in the right posterior temporal lobe slightly smaller than on previous study dated 16 May 2022. 3. Small amount of extra-axial air smaller than on previous study dated 16 May 2022. 4. Diminished attenuation in the white matter most likely representing ischemic changes. 5. Mild dilatation of the third and lateral ventricles. 6. Postoperative changes in the right globe. Barriers to Discharge: Physiatry and Hospice consult. Neurosurgery following, PT/OT. IV fluids, IV Rocephin, Cipro eye drops, Keppra, Protonix, oral Prednisone. PCP: Amy Hernandez MD  Readmission Risk Score: 27 ( )%  Patient Goals/Plan/Treatment Preferences: From home. Will follow PT/OT. Physiatry recommendations. SW following.

## 2022-05-26 PROCEDURE — 97530 THERAPEUTIC ACTIVITIES: CPT

## 2022-05-26 PROCEDURE — 6370000000 HC RX 637 (ALT 250 FOR IP): Performed by: EMERGENCY MEDICINE

## 2022-05-26 PROCEDURE — 99232 SBSQ HOSP IP/OBS MODERATE 35: CPT | Performed by: NEUROLOGICAL SURGERY

## 2022-05-26 PROCEDURE — 2580000003 HC RX 258: Performed by: EMERGENCY MEDICINE

## 2022-05-26 PROCEDURE — 2060000000 HC ICU INTERMEDIATE R&B

## 2022-05-26 PROCEDURE — 97110 THERAPEUTIC EXERCISES: CPT

## 2022-05-26 PROCEDURE — 2500000003 HC RX 250 WO HCPCS: Performed by: EMERGENCY MEDICINE

## 2022-05-26 PROCEDURE — 6360000002 HC RX W HCPCS: Performed by: EMERGENCY MEDICINE

## 2022-05-26 RX ADMIN — TRAZODONE HYDROCHLORIDE 50 MG: 50 TABLET ORAL at 21:37

## 2022-05-26 RX ADMIN — LEVOTHYROXINE SODIUM 100 MCG: 0.1 TABLET ORAL at 05:28

## 2022-05-26 RX ADMIN — METOPROLOL TARTRATE 12.5 MG: 25 TABLET, FILM COATED ORAL at 21:32

## 2022-05-26 RX ADMIN — Medication 1 TABLET: at 09:53

## 2022-05-26 RX ADMIN — LEVETIRACETAM 500 MG: 500 TABLET, FILM COATED ORAL at 21:32

## 2022-05-26 RX ADMIN — SODIUM CHLORIDE, PRESERVATIVE FREE 10 ML: 5 INJECTION INTRAVENOUS at 21:32

## 2022-05-26 RX ADMIN — CIPROFLOXACIN 1 DROP: 3 SOLUTION OPHTHALMIC at 05:28

## 2022-05-26 RX ADMIN — AMIODARONE HYDROCHLORIDE 200 MG: 200 TABLET ORAL at 09:53

## 2022-05-26 RX ADMIN — ISOSORBIDE MONONITRATE 30 MG: 30 TABLET, EXTENDED RELEASE ORAL at 09:53

## 2022-05-26 RX ADMIN — CEFTRIAXONE SODIUM 1000 MG: 10 INJECTION, POWDER, FOR SOLUTION INTRAVENOUS at 09:53

## 2022-05-26 RX ADMIN — PANTOPRAZOLE SODIUM 40 MG: 40 TABLET, DELAYED RELEASE ORAL at 05:28

## 2022-05-26 RX ADMIN — CEFTRIAXONE SODIUM 1000 MG: 1 INJECTION, POWDER, FOR SOLUTION INTRAMUSCULAR; INTRAVENOUS at 21:37

## 2022-05-26 RX ADMIN — PREDNISONE 1 MG: 1 TABLET ORAL at 09:53

## 2022-05-26 RX ADMIN — SODIUM CHLORIDE, PRESERVATIVE FREE 10 ML: 5 INJECTION INTRAVENOUS at 09:53

## 2022-05-26 RX ADMIN — LEVETIRACETAM 500 MG: 500 TABLET, FILM COATED ORAL at 09:53

## 2022-05-26 RX ADMIN — MUPIROCIN: 20 OINTMENT TOPICAL at 09:53

## 2022-05-26 RX ADMIN — CETIRIZINE HYDROCHLORIDE 10 MG: 10 TABLET, FILM COATED ORAL at 09:53

## 2022-05-26 RX ADMIN — SERTRALINE 50 MG: 50 TABLET, FILM COATED ORAL at 09:53

## 2022-05-26 ASSESSMENT — PAIN DESCRIPTION - ONSET: ONSET: ON-GOING

## 2022-05-26 ASSESSMENT — PAIN DESCRIPTION - LOCATION: LOCATION: HEAD

## 2022-05-26 ASSESSMENT — PAIN DESCRIPTION - PAIN TYPE: TYPE: ACUTE PAIN

## 2022-05-26 ASSESSMENT — PAIN DESCRIPTION - DESCRIPTORS: DESCRIPTORS: ACHING

## 2022-05-26 ASSESSMENT — PAIN - FUNCTIONAL ASSESSMENT: PAIN_FUNCTIONAL_ASSESSMENT: ACTIVITIES ARE NOT PREVENTED

## 2022-05-26 ASSESSMENT — PAIN SCALES - GENERAL
PAINLEVEL_OUTOF10: 0
PAINLEVEL_OUTOF10: 0
PAINLEVEL_OUTOF10: 8

## 2022-05-26 ASSESSMENT — PAIN DESCRIPTION - FREQUENCY: FREQUENCY: CONTINUOUS

## 2022-05-26 ASSESSMENT — PAIN DESCRIPTION - ORIENTATION: ORIENTATION: ANTERIOR

## 2022-05-26 NOTE — PROGRESS NOTES
[address]  Occupational Therapy  Daily Note  Time:   Time In: 492  Time Out: 978  Timed Code Treatment Minutes: 24 Minutes  Minutes: 24          Date: 2022  Patient Name: Lyndsey Doty,   Gender: female      Room: [facility]009-A  MRN: [id number]  : 1933  (80 y.o.)  Referring Practitioner: Chely Richardson. Celeste Banegas MD  Diagnosis: Confusion  Additional Pertinent Hx: Per chart review, \"Latanya Coates is a 80 y.o. female who presents to the emergency department for evaluation of increased confusion, headache, increased drainage from head wound. Patient has history of brain metastasis with craniotomy over a year ago, and procedure to drain subgaleal fluid. According to home health nurse, patient had increase drainage on her dressing today, as well as being more confused. Patient complaining of headache, unable to describe character or location. Patient is known to me, typically she is more arousable and interactive. The patient has no other acute complaints at this time. \"    Restrictions/Precautions:  Restrictions/Precautions: Fall Risk,General Precautions  Position Activity Restriction  Other position/activity restrictions: visual impairments, hx craniotomy 22     SUBJECTIVE: Pt sitting in recliner upon arrival, pt agreeable to OT session, pt noted to state that someone had broke into her home, with RN and IDT notified, pt assisted multiple times during session with attempts to call nephewCass. PAIN: 0/10:     Vitals: Nurse checked vitals prior to session    COGNITION: Slow Processing, Decreased Recall, Decreased Insight, Impaired Memory, Inattention, Decreased Problem Solving and Decreased Safety Awareness    ADL:   Grooming: Maximum Assistance. with combing hair due to hair extremely tangled and covered in blood, with assistance combing  Lower Extremity Dressing: Maximum Assistance.   with pt attempting to complete with crossing feet over, and then bending over with increased difficulty noted due to decreased vision. BALANCE:  Standing Balance: Minimal Assistance. with RW, with vc's and tc's for hand placement on the walker    BED MOBILITY:  Sit to Supine: Minimal Assistance with BLE back into bed    TRANSFERS:  Sit to Stand:  [employer]. from the recliner  Stand to Sit: [employer]. to the EOB    FUNCTIONAL MOBILITY:  Assistive Device: Rolling Walker  Assist Level:  Minimal Assistance. Distance: from the recliner to the EOB, with max vc's for placement of walker, and for safety during     ASSESSMENT:     Activity Tolerance:  Patient tolerance of  treatment: fair. Discharge Recommendations: Home with Hospice  Equipment Recommendations: Equipment Needed: Yes  Mobility Devices: ADL Assistive Devices  Other: continue to assess  Plan: Times per Week: 5x  Times per Day: Daily  Current Treatment Recommendations: Strengthening,Functional mobility training,Endurance training,Equipment evaluation, education, & procurement,Patient/Caregiver education & training,Safety education & training,Cognitive reorientation,Self-Care / ADL,Balance training,Cognitive/Perceptual training,Neuromuscular re-education    Patient Education  Patient Education: ADL's    Goals  Short Term Goals  Time Frame for Short term goals: upon discharge  Short Term Goal 1: Patient will complete static standing for 3 minutes with CGA to increase ease with grooming and dressing. Short Term Goal 2: Patient will learn visual compensatory techniques with SBA and 1-2 verbal cues to increase ease with self care tasks. Short Term Goal 3: Patient will complete UB ADL with CGA and LB ADL with MIN A and AE prn. Short Term Goal 4: Patient will complete functional ambulation short distances to increase ease with toileting. Following session, patient left in safe position with all fall risk precautions in place.

## 2022-05-26 NOTE — PROGRESS NOTES
[facility]  INPATIENT PHYSICAL THERAPY  DAILY NOTE  [id number]    Time In: 4634  Time Out: 9784  Timed Code Treatment Minutes: 26 Minutes  Minutes: 26          Date: 2022  Patient Name: Mikhail Hillman,  Gender:  female        MRN: [id number]  : 1933  (80 y.o.)     Referring Practitioner: Prashant Bazzi MD  Diagnosis: confusion  Additional Pertinent Hx: Per H&P : The patient is an 80-year-old female, whohad a known history of metastatic breast cancer, metastasized to theliver and also to the colon, and to the brain. The patient had adebulking of recurrent cancer of the breast and to the head by Shauna Torres, was re-operated on 2022. The patient subsequentlydeveloped a subgaleal fluid on the right parietal head where theincision is and sutures are, and has been aspirated twice during herlast hospitalization. The patient subsequently was discharged this pastFriday, 2022 and I have seen the patient that time and the patienthad a significant swelling on the right parietal head, the swelling isvery tense; however, it is not infected. The patient was discharged onthat day and at home, she was attended by the home health nurse,noticing more fluids coming out from the suture lines and the patientseems to be more confused. The patient was seen in the emergency roomand was noted to have a hyponatremia with the sodium of 124 for whichthe patient was hence admitted.      Prior Level of Function:  Lives With: Alone  Type of Home: House  Home Layout: One level  Home Access: Level entry  Home Equipment: Cane,Walker, rolling   Bathroom Shower/Tub: Tub/Shower unit  Bathroom Toilet: Handicap height  Bathroom Equipment: Shower chair  Bathroom Accessibility: Accessible    Receives Help From: Family  ADL Assistance: Independent  Homemaking Assistance: Independent  Homemaking Responsibilities: No  Ambulation Assistance: Needs assistance  Transfer Assistance: Needs assistance  Active : No  Additional Comments: Pt states her niece assisted with ambulation with RW at home since previous admission. States 24/7 assist at home, however, mentions her niece has back problems. Per RN, pt will likely not have 24/7 asssist at home. Restrictions/Precautions:  Restrictions/Precautions: Fall Risk,General Precautions  Position Activity Restriction  Other position/activity restrictions: visual impairments, hx craniotomy 5/11/22     SUBJECTIVE: RN approved session. Pt in bed upon arrival and agrees to therapy. Pt pleasant and cooperative for session however demos difficulty following directions at times and mild confusion. Pt to have meeting with family to discuss possible SNF vs hospice plans. PAIN: no c/o pain    Vitals: Vitals not assessed per clinical judgement, see nursing flowsheet    OBJECTIVE:  Bed Mobility:  Supine to Sit: Minimal Assistance, with head of bed raised, with rail, with verbal cues , with increased time for completion  Scooting: Minimal Assistance, with verbal cues , with increased time for completion    Transfers:  Sit to Stand: Minimal Assistance, Moderate Assistance, with increased time for completion, cues for hand placement, with verbal cues. 2 trials completed, pt leans retro  Stand to Sit:Minimal Assistance, Moderate Assistance, with increased time for completion, cues for hand placement, with verbal cues    Ambulation:  Minimal Assistance, X 2, with cues for safety, with verbal cues , with increased time for completion  Distance: 4ft  Surface: Level Tile  Device:Rolling Walker  Gait Deviations: Forward Flexed Posture, Slow Gillian, Decreased Step Length Bilaterally, Decreased Gait Speed, Decreased Heel Strike Bilaterally, Mild Path Deviations, Unsteady Gait, Decreased Terminal Knee Extension and pt having difficulty following directions and requires max cues for direction and erect posture.  Pt becomes more forward flexed and flexed through hips and knees with mobility. Physical assist to guide RW needed, unsteady at times with posterior lean    Exercise:  Patient was guided in 1 set(s) 10 reps of exercise to both lower extremities. Ankle pumps, Glut sets, Quad sets, Heelslides and Hip abduction/adduction. Exercises were completed for increased independence with functional mobility. Functional Outcome Measures: Completed  AM-PAC Inpatient Mobility Raw Score : 12  AM-PAC Inpatient T-Scale Score : 35.33    ASSESSMENT:  Assessment: Patient progressing toward established goals. Activity Tolerance:  Patient tolerance of  treatment: good. Pt demos decreased independence with functional mobility and is unsteady on feet.  Pt would benefit from cont PT at this time, will require 1-2 assist to ensure safety     Equipment Recommendations:Equipment Needed: No (pt has a RW, continue to assess needs)  Discharge Recommendations: will cont to assess pending family meeting and decision- would benefit from [address] / 24 hour assistance or supervision and continued PT at discharge  Plan: Current Treatment Recommendations: Strengthening,Balance training,Functional mobility training,Transfer training,Gait training,Endurance training,Neuromuscular re-education,Home exercise program,Safety education & training,Patient/Caregiver education & training,Equipment evaluation, education, & procurement,Therapeutic activities  Plan:  (6x N)    Patient Education  Patient Education: Plan of Care, Bed Mobility, Transfers, Gait, Verbal Exercise Instruction    Goals:  Patient goals : \"no\"  Short Term Goals  Time Frame for Short term goals: by hospital d/c  Short term goal 1: Pt to demo supine <-> sit with S for ability to get in and out of bed easily  Short term goal 2: Pt to complete sit <->stand with RW with SBA for improved safety with getting up from surfaces  Short term goal 3: Pt to ambulate >=20' with RW and CGA for improved ability to move safely in her environment  Long Term Goals  Time Frame for Long term goals : NA due to short ELOS    Following session, patient left in safe position with all fall risk precautions in place.

## 2022-05-26 NOTE — CARE COORDINATION
5/26/22, 3:06 PM EDT    DISCHARGE PLANNING EVALUATION    Spoke with Kaleb Frederick and her niece Banhaileymarilin Ellie this afternoon. Елена expressed concerns about Kaleb Frederick going home rather than an ECF. Banlivia Oliveirats states that as listed, Se Santos is the updated POA and she is the person who should be making decisions for Kaleb Frederick when she is unable to make them for herself. ALEX states that at this point, Kaleb Frederick is very capable of making her own decisions in the aspect of ECF vs. Home. ALEX asked Kaleb Frederick what she thought of going to a nursing home short-term for rehab and she expressed agreement with this, after speaking to Dr. Rachel Baldwin, who also suggested rehab for a few weeks. Kaleb Frederick states that [facility] and liked it very much there. She is agreeable to returning there for rehab at discharge and asked SW to call stanley Santos [facility]). ALEX will make referral to [facility] and call Se Santos. Spoke with Se Santos over the phone, she is also agreeable to Kaleb Frederick going to a facility for rehab until she is stronger. Explained the process of nursing home referrals to Se Santos and let her know ALEX would update her as things are happening.

## 2022-05-26 NOTE — PLAN OF CARE
Problem: Discharge Planning  Goal: Discharge to home or other facility with appropriate resources  5/26/2022 1216 by Giorgio Sauceda RN  Outcome: Progressing  Patient to be discharged to SNF d/t need for 24 hour care. SW following. Problem: Pain  Goal: Verbalizes/displays adequate comfort level or baseline comfort level  5/26/2022 1216 by Giorgio Sauceda RN  Outcome: Progressing  Patient denies pain up to this point of shift. Pain assessment ongoing. Problem: Safety - Adult  Goal: Free from fall injury  5/26/2022 1216 by Giorgio Sauceda RN  Outcome: Progressing  Patient remains free from falls this shift. Fall risk assessment complete. Fall sign posted. Non skid socks on. Bed in lowest position, 2/4 side rails up. Bed alarm on. Call light and all possessions within reach. Patient at risk due to generalized weakness. Problem: Skin/Tissue Integrity  Goal: Absence of new skin breakdown  Description: 1. Monitor for areas of redness and/or skin breakdown  2. Assess vascular access sites hourly  3. Every 4-6 hours minimum:  Change oxygen saturation probe site  4. Every 4-6 hours:  If on nasal continuous positive airway pressure, respiratory therapy assess nares and determine need for appliance change or resting period. 5/26/2022 1216 by Giorgio Sauceda RN  Outcome: Progressing  Skin assessment complete. See LDA/flow sheet. Patient up to chair for meals. Patient repositioned every 2 hours while in bed. Patient educated on making frequent position changes to minimize skin breakdown. Verbalized understanding. Care plan reviewed with patient. Patient verbalized understanding of the plan of care and contributes to goal setting.

## 2022-05-26 NOTE — PROGRESS NOTES
NEUROSURGERY PROGRESS NOTE     80 RH lady. Stage IV breast CA DXd in 2016 with liver mets. Brain mets > craniotomy in July 2021. (? ER/NC and [id number] status) Craniotomy reportedly followed by chemo. No mention of RT. Presented with right parieto-occipital recurrence. Re-operated on 5/11/2022. Had some subgaleal fluid aspirations post-op. Now presented with headaches and confusion.     BP 140/50  HR 60  T    37.1     NEURO:  Awake, alert, confused, fairly cooperative  Motor grossly 5/5     Incision . Minimal subgaleal fluid not under tension. Absent drainage with pressure dressing/ head wrap. Currently not covered.     Impression/Plan:  If discharged could reapply head wrap if fluid drainage recurs.  Even though sutures were supposed to be removed, they should stay for at least another week.     Raj White    Neurosurgery

## 2022-05-26 NOTE — CARE COORDINATION
5/26/22, 2:47 PM EDT    DISCHARGE ON GOING EVALUATION    [facility] day: 1  Location: 4A-09/009-A Reason for admit: Confusion [R41.0]  Intractable headache, unspecified chronicity pattern, unspecified headache type [R51.9]   Procedure: 5/23:CT Head WO Contrast 1. Postoperative changes involving the right posterior temporal and parietal calvarium. 2. 3.2 x 1.6 cm area of encephalomalacia in the right posterior temporal lobe slightly smaller than on previous study dated 16 May 2022. 3. Small amount of extra-axial air smaller than on previous study dated 16 May 2022. 4. Diminished attenuation in the white matter most likely representing ischemic changes. 5. Mild dilatation of the third and lateral ventricles. 6. Postoperative changes in the right globe.   Barriers to Discharge: Rocephin IV. Hospice consulted-pt and family decided no. Would like to pursue SNF. PCP: Angeles Falcon MD  Readmission Risk Score: 27 ( )%  Patient Goals/Plan/Treatment Preferences: Plan to discharge to SNF. SW following. Will continue to follow for any additional needs.  Has [facility] for O2 (2L ATC)

## 2022-05-26 NOTE — PROGRESS NOTES
I called Kaylynn Jimenez  and [facility]  the POA discussed about the patient's prognosis including likely a recurrence of the brain tumor and that chemotherapy had failed. Discussed also the need for Latanya to require 24-hour care and moderate heavy assistance. Advised them that the rehab recommendation is to go to the nursing home. Jackson Barton and Penny Guerrero concurred of her Jb Marceloa going to the nursing home.

## 2022-05-26 NOTE — PLAN OF CARE
Problem: Discharge Planning  Goal: Discharge to home or other facility with appropriate resources  5/25/2022 2222 by Giana Bagley RN  Outcome: Progressing  Flowsheets  Taken 5/25/2022 2222  Discharge to home or other facility with appropriate resources:   Identify barriers to discharge with patient and caregiver   Identify discharge learning needs (meds, wound care, etc)  Taken 5/25/2022 2045  Discharge to home or other facility with appropriate resources: Identify barriers to discharge with patient and caregiver     Problem: Pain  Goal: Verbalizes/displays adequate comfort level or baseline comfort level  5/25/2022 2222 by Giana Bagley RN  Outcome: Progressing  Flowsheets (Taken 5/25/2022 2222)  Verbalizes/displays adequate comfort level or baseline comfort level:   Encourage patient to monitor pain and request assistance   Administer analgesics based on type and severity of pain and evaluate response  Note: Ongoing assessment & interventions provided throughout shift. Reminded patient to report any pain, pressure, or shortness of breath to the nurse. Pain medications provided per physician's orders. Problem: Safety - Adult  Goal: Free from fall injury  Outcome: Progressing  Flowsheets  Taken 5/25/2022 2222  Free From Fall Injury:   Instruct family/caregiver on patient safety   Based on caregiver fall risk screen, instruct family/caregiver to ask for assistance with transferring infant if caregiver noted to have fall risk factors  Taken 5/25/2022 2155  Free From Fall Injury: Instruct family/caregiver on patient safety  Note: Bed locked & in low position, call light in reach, side-rails up x2, bed/chair alarm utilized, non-slip socks on when ambulating, reminded patient to use call light to call for assistance.        Problem: ABCDS Injury Assessment  Goal: Absence of physical injury  5/25/2022 2222 by Giana Bagley RN  Outcome: Progressing  Flowsheets  Taken 5/25/2022 2222  Absence of Physical Injury: Implement safety measures based on patient assessment  Taken 5/25/2022 2155  Absence of Physical Injury: Implement safety measures based on patient assessment  Note: Patient is repositioned throughout shift. Problem: Skin/Tissue Integrity  Goal: Absence of new skin breakdown  Description: 1. Monitor for areas of redness and/or skin breakdown  2. Assess vascular access sites hourly  3. Every 4-6 hours minimum:  Change oxygen saturation probe site  4. Every 4-6 hours:  If on nasal continuous positive airway pressure, respiratory therapy assess nares and determine need for appliance change or resting period. 5/25/2022 2222 by Katie Estrada RN  Outcome: Progressing  Note: Ongoing assessment & interventions provided throughout shift. Skin assessments provided. Assisted patient to turn as needed.        Problem: Neurosensory - Adult  Goal: Achieves stable or improved neurological status  Outcome: Progressing  Flowsheets  Taken 5/25/2022 2222 by Katie Estrada RN  Achieves stable or improved neurological status:   Initiate measures to prevent increased intracranial pressure   Assess for and report changes in neurological status  Taken 5/25/2022 2045 by Katie Estrada RN  Achieves stable or improved neurological status: Assess for and report changes in neurological status  Taken 5/25/2022 0902 by Devan Winn RN  Achieves stable or improved neurological status: Assess for and report changes in neurological status     Problem: Cardiovascular - Adult  Goal: Maintains optimal cardiac output and hemodynamic stability  Outcome: Progressing  Flowsheets  Taken 5/25/2022 2222 by Katie Estrada RN  Maintains optimal cardiac output and hemodynamic stability:   Monitor blood pressure and heart rate   Monitor urine output and notify Licensed Independent Practitioner for values outside of normal range   Assess for signs of decreased cardiac output  Taken 5/25/2022 2045 by Katie Estrada

## 2022-05-26 NOTE — PROGRESS NOTES
Physician Progress Note      Elena CABELLO #:                  [id number]  :                       1933  ADMIT DATE:       2022 11:23 AM  DISCH DATE:  RESPONDING  PROVIDER #:        Yesenia Herzog MD          QUERY TEXT:    Pt admitted with drainage from head wound and hyponatremia. Pt noted to have   increased confusion. If possible, please document in the progress notes and   discharge summary if you are evaluating and / or treating any of the   following: The medical record reflects the following:    Risk Factors: hyponatremia, brain mets  Clinical Indicators: presents with increased confusion, usually more   interactive and arousable, Sodium level 124, mentation and sodium level   improved  Treatment: IVF bolus, continuous IVF and monitoring    Thank you! Rachele Garcia, NABEELR  RN Clinical   P: [phone]  Options provided:  -- Metabolic encephalopathy  -- Toxic encephalopathy  -- Encephalopathy due to brain mets  -- Other - I will add my own diagnosis  -- Disagree - Not applicable / Not valid  -- Disagree - Clinically unable to determine / Unknown  -- Refer to Clinical Documentation Reviewer    PROVIDER RESPONSE TEXT:    Provider disagreed with this query.   Patient had hyponatremia causing confusion    Query created by: Jaguar Serrano on 2022 9:04 AM      Electronically signed by:  Yesenia Herzog MD 2022 3:53 PM

## 2022-05-26 NOTE — PROGRESS NOTES
Family Medicine Progress Notes/Coverage    Today's Date: 5/26/22  4A-09/009-A  Medical Record # [id number]  CSN/Account # [de-identified]      Ms. Rod admitted on 5/23/2022        Subjective / Interval History :     Doing well  Anxious to go home  Swelling head is much better  Eye drainage is better  Seen Hospice and P & R yesterday.   Reviewed notes, consults, laboratory and radiology results,    Objective:       Physical Exam:  Patient Vitals for the past 24 hrs:   BP Temp Temp src Pulse Resp SpO2   05/26/22 0315 (!) 126/54 97.9 °F (36.6 °C) Oral 62 18 98 %   05/25/22 2345 (!) 132/44 97.8 °F (36.6 °C) Oral 57 18 100 %   05/25/22 2045 (!) 138/52 97.6 °F (36.4 °C) Oral 62 18 100 %   05/25/22 1506 128/65 98.2 °F (36.8 °C) Oral 63 16 97 %   05/25/22 1152 (!) 177/75 97.5 °F (36.4 °C) Oral 61 18 97 %   05/25/22 0859 (!) 145/47 98.3 °F (36.8 °C) Oral 59 18 97 %       General Appearance:  Alert, cooperative, no distress, appears stated age   HEENT:  Neck: Swelling right parietal scalp better     Chest/Lungs:  Heart: Few crackles at bases  Sinus bradycardia   Abdomen:  Back: soft   Extremities:  Neurological Exam: No edema  WNL       Assessment:     Admitting Diagnosis:    Confusion [R41.0]  Intractable headache, unspecified chronicity pattern, unspecified headache type [R51.9]    Active Hospital Problems    Diagnosis Date Noted    Chronic atrial fibrillation ([facility] [I48.20] 05/24/2022     Priority: High    Breast 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 ([facility] [C50.919, C79.31] 05/24/2022     Priority: High    Confusion [R41.0] 05/23/2022     Priority: High    Brain metastases [facility]) [C79.31]      Priority: High    Status post craniotomy [Z98.890] 07/02/2021     Priority: High    Intractable headache [R51.9]      Priority: Medium    Essential hypertension [I10]      Priority: Medium  History of craniotomy [Z98.890] 07/2021     Priority: Medium    Hx of breast cancer with liver mets [Z85.3] 08/13/2020     Priority: Medium    Current chronic use of systemic steroids [Z79.52] 09/04/2018     Priority: Medium    Coronary artery disease involving native heart without angina pectoris [I25.10] 11/06/2017     Priority: Medium    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]      Priority: Medium    Breast cancer metastasized to liver ([address] [C50.919, C78.7] 05/10/2016     Priority: Medium    Hypothyroidism due to acquired atrophy of thyroid [E03.4] 03/03/2016     Priority: Medium    Temporal arteritis ([address] [M31.6]      Priority: Medium    GERD (gastroesophageal reflux disease) [K21.9]      Priority: Medium    Hypothyroidism [E03.9]      Priority: Medium       Plan:     Discussed plans with the nursing staff  Seen with the nursing staff Florentin and , patient do not want to be on hospice at this time, She wants to go home with her brother Amrik, and niece to help her at home ( they are from [place])  She may go home with HHN, OT.PT aide.  She needs 2 person heavy assistance  I recommend her to go to nursing home      Medications, Laboratories and Imaging results:    Scheduled Meds:   ciprofloxacin  1 drop Both Eyes Q2H While awake    cefTRIAXone (ROCEPHIN) IV  1,000 mg IntraVENous BID    amiodarone  200 mg Oral Daily    isosorbide mononitrate  30 mg Oral Daily    levETIRAcetam  500 mg Oral BID    levothyroxine  100 mcg Oral Daily    cetirizine  10 mg Oral Daily    metoprolol tartrate  12.5 mg Oral BID    multivitamin  1 tablet Oral Daily    pantoprazole  40 mg Oral QAM AC    predniSONE  1 mg Oral Daily    sertraline  50 mg Oral Daily    traZODone  50 mg Oral Nightly    sodium chloride flush  5-40 mL IntraVENous 2 times per day    mupirocin   Topical Daily     Continuous Infusions:   sodium chloride       PRN Meds:albuterol sulfate HFA, sodium chloride flush, sodium chloride, ondansetron **OR** ondansetron, polyethylene glycol, acetaminophen **OR** acetaminophen, HYDROcodone-acetaminophen    Imaging:    Lab Review :    Lab Results   Component Value Date    WBC 9.8 05/23/2022    HGB 10.9 (L) 05/23/2022    HCT 34.6 (L) 05/23/2022    MCV 89.6 05/23/2022     05/23/2022     Lab Results   Component Value Date    CREATININE 0.5 05/25/2022    BUN 9 05/25/2022     (L) 05/25/2022    K 4.2 05/25/2022     05/25/2022    CO2 25 05/25/2022     Lab Results   Component Value Date    CKTOTAL 70 06/28/2021     Lab Results   Component Value Date    ALT 63 05/23/2022    AST 85 (H) 05/23/2022    ALKPHOS 42 05/23/2022    BILITOT 0.4 05/23/2022     Lab Results   Component Value Date    LIPASE 20.4 05/06/2022         Electronically signed by Cris Ingram MD on 5/26/22 at 7:09 Laura Barnett M.D.

## 2022-05-27 PROCEDURE — 2580000003 HC RX 258: Performed by: EMERGENCY MEDICINE

## 2022-05-27 PROCEDURE — 97535 SELF CARE MNGMENT TRAINING: CPT

## 2022-05-27 PROCEDURE — 97530 THERAPEUTIC ACTIVITIES: CPT

## 2022-05-27 PROCEDURE — 97110 THERAPEUTIC EXERCISES: CPT

## 2022-05-27 PROCEDURE — 6370000000 HC RX 637 (ALT 250 FOR IP): Performed by: EMERGENCY MEDICINE

## 2022-05-27 PROCEDURE — 99231 SBSQ HOSP IP/OBS SF/LOW 25: CPT | Performed by: NEUROLOGICAL SURGERY

## 2022-05-27 PROCEDURE — 6360000002 HC RX W HCPCS: Performed by: EMERGENCY MEDICINE

## 2022-05-27 PROCEDURE — 2060000000 HC ICU INTERMEDIATE R&B

## 2022-05-27 RX ORDER — GLUCOSAMINE SULFATE 500 MG
TABLET ORAL
Status: ON HOLD | COMMUNITY
Start: 2022-04-18 | End: 2022-05-28 | Stop reason: HOSPADM

## 2022-05-27 RX ADMIN — ISOSORBIDE MONONITRATE 30 MG: 30 TABLET, EXTENDED RELEASE ORAL at 10:36

## 2022-05-27 RX ADMIN — LEVOTHYROXINE SODIUM 100 MCG: 0.1 TABLET ORAL at 06:17

## 2022-05-27 RX ADMIN — PANTOPRAZOLE SODIUM 40 MG: 40 TABLET, DELAYED RELEASE ORAL at 06:13

## 2022-05-27 RX ADMIN — LEVETIRACETAM 500 MG: 500 TABLET, FILM COATED ORAL at 10:36

## 2022-05-27 RX ADMIN — SODIUM CHLORIDE, PRESERVATIVE FREE 10 ML: 5 INJECTION INTRAVENOUS at 12:01

## 2022-05-27 RX ADMIN — LEVETIRACETAM 500 MG: 500 TABLET, FILM COATED ORAL at 20:44

## 2022-05-27 RX ADMIN — PREDNISONE 1 MG: 1 TABLET ORAL at 10:36

## 2022-05-27 RX ADMIN — SERTRALINE 50 MG: 50 TABLET, FILM COATED ORAL at 10:36

## 2022-05-27 RX ADMIN — METOPROLOL TARTRATE 12.5 MG: 25 TABLET, FILM COATED ORAL at 20:44

## 2022-05-27 RX ADMIN — Medication 1 TABLET: at 10:36

## 2022-05-27 RX ADMIN — TRAZODONE HYDROCHLORIDE 50 MG: 50 TABLET ORAL at 20:45

## 2022-05-27 RX ADMIN — CETIRIZINE HYDROCHLORIDE 10 MG: 10 TABLET, FILM COATED ORAL at 10:36

## 2022-05-27 RX ADMIN — CEFTRIAXONE SODIUM 1000 MG: 1 INJECTION, POWDER, FOR SOLUTION INTRAMUSCULAR; INTRAVENOUS at 21:15

## 2022-05-27 RX ADMIN — MUPIROCIN: 20 OINTMENT TOPICAL at 12:01

## 2022-05-27 RX ADMIN — METOPROLOL TARTRATE 12.5 MG: 25 TABLET, FILM COATED ORAL at 10:36

## 2022-05-27 RX ADMIN — SODIUM CHLORIDE, PRESERVATIVE FREE 10 ML: 5 INJECTION INTRAVENOUS at 20:44

## 2022-05-27 RX ADMIN — ACETAMINOPHEN 650 MG: 325 TABLET ORAL at 20:44

## 2022-05-27 RX ADMIN — CEFTRIAXONE SODIUM 1000 MG: 1 INJECTION, POWDER, FOR SOLUTION INTRAMUSCULAR; INTRAVENOUS at 12:03

## 2022-05-27 RX ADMIN — AMIODARONE HYDROCHLORIDE 200 MG: 200 TABLET ORAL at 10:36

## 2022-05-27 ASSESSMENT — PAIN - FUNCTIONAL ASSESSMENT: PAIN_FUNCTIONAL_ASSESSMENT: ACTIVITIES ARE NOT PREVENTED

## 2022-05-27 ASSESSMENT — PAIN DESCRIPTION - DESCRIPTORS: DESCRIPTORS: ACHING

## 2022-05-27 ASSESSMENT — PAIN SCALES - GENERAL
PAINLEVEL_OUTOF10: 0
PAINLEVEL_OUTOF10: 3
PAINLEVEL_OUTOF10: 0

## 2022-05-27 ASSESSMENT — PAIN DESCRIPTION - LOCATION: LOCATION: HEAD

## 2022-05-27 ASSESSMENT — PAIN DESCRIPTION - ORIENTATION: ORIENTATION: ANTERIOR

## 2022-05-27 NOTE — PROGRESS NOTES
[facility]  INPATIENT PHYSICAL THERAPY  DAILY NOTE  [facility] NEUROSCIENCES 4A - 3E-42/162-T    Time In: 1324  Time Out: 1310  Timed Code Treatment Minutes: 25 Minutes  Minutes: 25          Date: 2022  Patient Name: Matthew Haines,  Gender:  female        MRN: [id number]  : 1933  (80 y.o.)     Referring Practitioner: Leyla Cartwright MD  Diagnosis: confusion  Additional Pertinent Hx: Per H&P : The patient is an 80-year-old female, whohad a known history of metastatic breast cancer, metastasized to theliver and also to the colon, and to the brain. The patient had adebulking of recurrent cancer of the breast and to the head by Josiah Donaldson, was re-operated on 2022. The patient subsequentlydeveloped a subgaleal fluid on the right parietal head where theincision is and sutures are, and has been aspirated twice during herlast hospitalization. The patient subsequently was discharged this pastFriday, 2022 and I have seen the patient that time and the patienthad a significant swelling on the right parietal head, the swelling isvery tense; however, it is not infected. The patient was discharged onthat day and at home, she was attended by the home health nurse,noticing more fluids coming out from the suture lines and the patientseems to be more confused. The patient was seen in the emergency roomand was noted to have a hyponatremia with the sodium of 124 for whichthe patient was hence admitted.      Prior Level of Function:  Lives With: Alone  Type of Home: House  Home Layout: One level  Home Access: Level entry  Home Equipment: Cane,Walker, rolling   Bathroom Shower/Tub: Tub/Shower unit  Bathroom Toilet: Handicap height  Bathroom Equipment: Shower chair  Bathroom Accessibility: Accessible    Receives Help From: Family  ADL Assistance: Independent  Homemaking Assistance: Independent  Homemaking Responsibilities: No  Ambulation Assistance: Needs assistance  Transfer Assistance: Needs assistance  Active : No  Additional Comments: Pt states her niece assisted with ambulation with RW at home since previous admission. States 24/7 assist at home, however, mentions her niece has back problems. Per RN, pt will likely not have 24/7 asssist at home. Restrictions/Precautions:  Restrictions/Precautions: Fall Risk,General Precautions  Position Activity Restriction  Other position/activity restrictions: visual impairments, hx craniotomy 5/11/22     SUBJECTIVE: RN approved session. Patient in chair at arrival, asleep and required increased time to become alert. Patient is pleasant and agreeable to therapy. Required assistance throughout session due to vision deficits. PAIN: Patient denied pain. Vitals: Oxygen: 85-93 on 2L. Nurse notified of changes. OBJECTIVE:  Bed Mobility:  Not Tested    Transfers:  Sit to Stand: Moderate Assistance, cues for hand placement and safety awareness. Stand to Sit:Moderate Assistance, cues for hand placement, Patient sat without warning and was unable to control descend to chair. Education provided on safety. Ambulation:  Minimal Assistance, X 1, with cues for safety, with verbal cues , with increased time for completion  Distance: 4' fwd/bwd   Surface: Level Tile  Device:Rolling Walker  Gait Deviations: Forward Flexed Posture, Slow Gillian, Decreased Step Length Bilaterally, Decreased Gait Speed, Decreased Heel Strike Bilaterally, Mild Path Deviations, Unsteady Gait and Decreased Terminal Knee Extension. Patient had difficulty following directions requiring max cues to complete ambulation properly. Patient frequently demonstrated increased lean posteriorly requiring tactile cues to correct. Physical Assistance needed to properly guide walker. Balance:  Static Sitting Balance:  Contact Guard Assistance, Minimal Assistance, cues for safety.    Static Standing Balance: Minimal Assistance, Moderate Assistance, cues for hand placement on walker with hand over hand. Unable to maintain for greater than a few seconds     Exercise:  Patient was guided in 1 set(s) 10 reps of exercise to both lower extremities. Hip abduction/adduction, Straight leg raises, Seated marches, Seated heel/toe raises and Long arc quads. Max verbal cues needed to complete all exercises properly. Tactile cues needed to demonstration how to properly complete due to vision deficits. Exercises were completed for increased independence with functional mobility. Functional Outcome Measures: Completed  AM-PAC Inpatient Mobility Raw Score : 12  AM-PAC Inpatient T-Scale Score : 35.33    ASSESSMENT:  Assessment: Patient progressing toward established goals. Activity Tolerance:  Patient tolerance of  treatment: fair.       Equipment Recommendations:Equipment Needed: No (pt has a RW, continue to assess needs)  Discharge Recommendations: Continue to assess pending progress, Subacte/Skilled Nursing Facility / 24 hour assistance or supervision and would benefit from continued PT at discharge  Plan: Current Treatment Recommendations: Strengthening,Balance training,Functional mobility training,Transfer training,Gait training,Endurance training,Neuromuscular re-education,Home exercise program,Safety education & training,Patient/Caregiver education & training,Equipment evaluation, education, & procurement,Therapeutic activities  Plan:  (6x N)    Patient Education  Patient Education: Plan of Care    Goals:  Patient goals : \"no\"  Short Term Goals  Time Frame for Short term goals: by hospital d/c  Short term goal 1: Pt to demo supine <-> sit with S for ability to get in and out of bed easily  Short term goal 2: Pt to complete sit <->stand with RW with SBA for improved safety with getting up from surfaces  Short term goal 3: Pt to ambulate >=20' with RW and CGA for improved ability to move safely in her environment  Long Term Goals  Time Frame for Long term goals : NA due to short ELOS    Following session, patient left in safe position with all fall risk precautions in place.

## 2022-05-27 NOTE — DISCHARGE INSTR - COC
Continuity of Care Form    Patient Name: Cesar Valdivia   :  1933  MRN:  [id number]    Admit date:  2022  Discharge date:  22    Code Status Order: DNR-CCA   Advance Directives:      Admitting Physician:  Viktor Vaca MD  PCP: Viktor Vaca MD    Discharging Nurse: Dara Robins [place] Unit/Room#: 4A-09/009-A  Discharging Unit Phone Number: [phone]    Emergency Contact:   Extended Emergency Contact Information  Primary Emergency Contact: Edmund Leary   [address] Phone: [phone]  Mobile Phone: [phone]  Relation: Niece/Nephew  Hearing or visual needs: None  Other needs: None  Preferred language: English   needed?  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 performed by Neda Chang MD at [address] [place] [address] Right 2022    RIGHT SIDED CRANIOTOMY FOR TUMOR performed by Neda Chang MD at [address], DIAGNOSTIC      EYE REMOVAL Right 2017    EYE SURGERY      EYE SURGERY Right 2017    Dr [facility]anci [facility]aud[facility] in [facility]    OTHER SURGICAL HISTORY  2016    robotic assisted laparoscopic anterior colon resection and end colostomy    AR OFFICE/OUTPT VISIT,PROCEDURE ONLY N/A 2018    COLOSTOMY REVERSAL AND PARASTOMAL HERNIA REPAIR performed by Matt Page MD at [address] [place] [address] / [facility]    UPPER GASTROINTESTINAL ENDOSCOPY       BREAST NEEDLE BIOPSY LEFT Left 2016 [facility]       Immunization History:   Immunization History   Administered Date(s) Administered    Influenza 09/25/2012    Influenza Virus Vaccine 10/19/2011, 10/28/2014, 12/01/2015    Influenza, Teodoro Workman, IM, (6 mo and older Fluzone, Flulaval, Fluarix and 3 yrs and older Afluria) 10/31/2017, 12/11/2018, 09/17/2019, 12/08/2020    PPD Test 06/01/2016, 06/08/2016, 09/11/2018, 05/25/2019, 06/02/2019    Pneumococcal Conjugate 13-valent Gisselle Hudson) 12/01/2015    Pneumococcal Polysaccharide (Tekzekjiq52) 11/19/2007    Td (Adult), 5 Lf Tetanus Toxoid, Pf (Tenivac, Decavac) 10/30/2019       Active Problems:  Patient Active Problem List   Diagnosis Code    Hypothyroidism E03.9    GERD (gastroesophageal reflux disease) K21.9    Temporal arteritis (HCC) M31.6    Hyperlipidemia E78.5    Hypothyroidism due to acquired atrophy of thyroid E03.4    Steroid-induced hyperglycemia R73.9, T38.0X5A    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 XXXA, Y92.009    Metastatic breast cancer ([facility] C50.919    Status post craniotomy Z98.890    Impaired functional mobility, balance, gait, and endurance Z74.09    Acute cystitis without hematuria N30.00    History of craniotomy Z98.890    Pulmonary nodules R91.8    Essential hypertension I10    Stenosis of left subclavian artery (HCC) I77.1    Brain metastases (HCC) C79.31    Confusion R41.0    Chronic atrial fibrillation (HCC) I48.20    Breast 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 (HCC) C50.919, C79.31    Intractable headache R51.9       Isolation/Infection:   Isolation            No Isolation          Patient Infection Status       Infection Onset Added Last Indicated Last Indicated By Review Planned Expiration Resolved Resolved By    None active    Resolved    COVID-19 (Rule Out) 22 COVID-19, Rapid (Ordered)   22 Rule-Out Test Resulted    COVID-19 22 COVID-19, Rapid   22     S/S , +    COVID-19 (Rule Out) 22 COVID-19, Rapid (Ordered)   22 Rule-Out Test Resulted    COVID-19 (Rule Out) 10/29/21 10/29/21 10/29/21 COVID-19, Rapid (Ordered)   10/29/21 Rule-Out Test Resulted    C-diff Rule Out 20 Gastrointestinal Panel, Molecular (Ordered)   20 Rule-Out Test Resulted            Nurse Assessment:  Last Vital Signs: BP (!) 131/47   Pulse 67   Temp 98 °F (36.7 °C) (Oral)   Resp 18   Wt 146 lb 9.6 oz (66.5 kg)   LMP  (LMP Unknown) Comment: age 52 ovaries intact  SpO2 96%   BMI 28.63 kg/m²     Last documented pain score (0-10 scale): Pain Level: 0  Last Weight:   Wt Readings from Last 1 Encounters:   22 146 lb 9.6 oz (66.5 kg)     Mental Status:  disoriented, alert, and intermittent confusion    IV Access:  - None    Nursing Mobility/ADLs:  Walking   Assisted  Transfer  Assisted  Bathing  Assisted  Dressing  Assisted  Toileting  Assisted  Feeding  Assisted  Med Admin  Assisted  Med Delivery   whole    Wound Care Documentation and Therapy:  Incision 22 Head Upper;Right (Active)   Dressing Status Clean;Dry; Intact 22 0800   Dressing Change Due 22 1011   Incision Cleansed Soap and water 22 1011   Dressing/Treatment Gauze dressing/dressing sponge;Roll gauze 22 0800   Closure Other (Comment) 22 0800   Margins Approximated 22 1011   Incision Assessment Dry 22 1011   Drainage Amount None 22 1011   Drainage Description Other (Comment) 05/27/22 0800   Odor None 05/26/22 1011   Shima-incision Assessment Intact 05/26/22 1011   Number of days: 16        Elimination:  Continence: Bowel: Yes  Bladder: No  Urinary Catheter: None   Colostomy/Ileostomy/Ileal Conduit: No       Date of Last BM: 5/27/20222    Intake/Output Summary (Last 24 hours) at 5/27/2022 1507  Last data filed at 5/27/2022 0618  Gross per 24 hour   Intake 300 ml   Output 1550 ml   Net -1250 ml     I/O last 3 completed shifts: In: 440 [P.O.:360; I.V.:30; IV Piggyback:50]  Out: 2075 [Urine:2075]    Safety Concerns: At Risk for Falls    Impairments/Disabilities:      Vision    Nutrition Therapy:  Current Nutrition Therapy:   - Oral Diet:  General    Routes of Feeding: Oral  Liquids: Thin Liquids  Daily Fluid Restriction: no  Last Modified Barium Swallow with Video (Video Swallowing Test): not done    Treatments at the Time of Hospital Discharge:   Respiratory Treatments: None  Oxygen Therapy:  is on oxygen at 2 L/min per nasal cannula. Ventilator:    - No ventilator support    Rehab Therapies: Physical Therapy, Occupational Therapy, and Speech/Language Therapy  Weight Bearing Status/Restrictions: No weight bearing restrictions  Other Medical Equipment (for information only, NOT a DME order):  walker  Other Treatments: None    Patient's personal belongings (please select all that are sent with patient):  None    RN SIGNATURE:  Electronically signed by Gerri Munoz RN on 5/28/22 at 2:38 PM EDT    CASE MANAGEMENT/SOCIAL WORK SECTION    Inpatient Status Date: ***    Readmission Risk Assessment Score:  Readmission Risk              Risk of Unplanned Readmission:  33           Discharging to Facility/ Agency   Name: [facility]    Address: [address] [place] [address]. [address]  Phone: [phone]  Fax: [phone]    Dialysis Facility (if applicable)   Name:  Address:  Dialysis Schedule:  Phone:  Fax:    / signature: Electronically signed by Natalie Conte WADEW on 5/27/22 at 3:08 PM EDT    PHYSICIAN SECTION    Prognosis: Fair    Condition at Discharge: Stable    Rehab Potential (if transferring to Rehab): Good    Recommended Labs or Other Treatments After Discharge: BMP , Magnesium in 1 week    Physician Certification: I certify the above information and transfer of Mikhail Hillman  is necessary for the continuing treatment of the diagnosis listed and that she requires [facility] for greater 30 days.      Update Admission H&P: No change in H&P    PHYSICIAN SIGNATURE:  Electronically signed by Prashant Bazzi MD on 5/28/22 at 8:57 AM EDT

## 2022-05-27 NOTE — CARE COORDINATION
5/27/22, 8:04 AM EDT    DISCHARGE PLANNING EVALUATION      Left a message for Dl at [facility] this morning to make referral for Latanya. Asked for a call back to discuss patient. 5/27/22, 3:11 PM EDT    DISCHARGE PLANNING EVALUATION    Called [facility] again and made referral. Information is all put together and placed on chart for a potential weekend discharge.

## 2022-05-27 NOTE — CARE COORDINATION
5/27/22, 2:02 PM EDT    DISCHARGE ON GOING EVALUATION    [facility] day: 2  Location: -09/009-A Reason for admit: Confusion [R41.0]  Intractable headache, unspecified chronicity pattern, unspecified headache type [R51.9]   Procedure:  5/23:CT Head WO Contrast 1. Postoperative changes involving the right posterior temporal and parietal calvarium. 2. 3.2 x 1.6 cm area of encephalomalacia in the right posterior temporal lobe slightly smaller than on previous study dated 16 May 2022. 3. Small amount of extra-axial air smaller than on previous study dated 16 May 2022. 4. Diminished attenuation in the white matter most likely representing ischemic changes. 5. Mild dilatation of the third and lateral ventricles. 6. Postoperative changes in the right globe.   Barriers to Discharge: Awaiting placement at SNF. PT/OTWilder De Guzman IV  PCP: Shola Mcknight MD  Readmission Risk Score: 25.9 ( )%  Patient Goals/Plan/Treatment Preferences: Sw following. Planning to discharge to SNF. [facility].

## 2022-05-27 NOTE — PROGRESS NOTES
NEUROSURGERY    No complaints  Subgaleal fluid practically resolved  No further fluid leakage  Keep sutures for a week    Awaiting nursing home    [facility], Neurosurgery

## 2022-05-27 NOTE — PLAN OF CARE
Patient A&Ox2, short term memory loss, reoriented to situation and place. Blind L eye, R eye prothesic. Bed alarm in place, call light within reach. Intermittent antibiotics. External catheter in place. Sinus rhythm on monitor. Problem: Discharge Planning  Goal: Discharge to home or other facility with appropriate resources  5/27/2022 0242 by Arlene Lawrence RN  Outcome: Progressing  5/27/2022 0242 by Arlene Lawrence RN  Outcome: Progressing     Problem: Pain  Goal: Verbalizes/displays adequate comfort level or baseline comfort level  5/27/2022 0242 by Arlene Lawrence RN  Outcome: Progressing  5/27/2022 0242 by Arlene Lawrence RN  Outcome: Progressing     Problem: Safety - Adult  Goal: Free from fall injury  5/27/2022 0242 by Arlene Lawrence RN  Outcome: Progressing  5/27/2022 0242 by Arlene Lawrence RN  Outcome: Progressing     Problem: ABCDS Injury Assessment  Goal: Absence of physical injury  5/27/2022 0242 by Arlene Lawrence RN  Outcome: Progressing  5/27/2022 0242 by Arlene Lawrence RN  Outcome: Progressing     Problem: Skin/Tissue Integrity  Goal: Absence of new skin breakdown  Description: 1. Monitor for areas of redness and/or skin breakdown  2. Assess vascular access sites hourly  3. Every 4-6 hours minimum:  Change oxygen saturation probe site  4. Every 4-6 hours:  If on nasal continuous positive airway pressure, respiratory therapy assess nares and determine need for appliance change or resting period.   5/27/2022 0242 by Arlene Lawrence RN  Outcome: Progressing  5/27/2022 0242 by Arlene Lawrence RN  Outcome: Progressing     Problem: Neurosensory - Adult  Goal: Achieves stable or improved neurological status  5/27/2022 0242 by Arlene Lawrence RN  Outcome: Progressing  5/27/2022 0242 by Arlene Lawrence RN  Outcome: Progressing  Goal: Absence of seizures  5/27/2022 0242 by Arlene Lawrence RN  Outcome: Progressing  5/27/2022 0242 by Arlene Lawrence RN  Outcome: Progressing  Goal: Achieves maximal functionality and self care  5/27/2022 0242 by Sebastian Martinez RN  Outcome: Progressing  5/27/2022 0242 by Sebastian Martinez RN  Outcome: Progressing     Problem: Cardiovascular - Adult  Goal: Maintains optimal cardiac output and hemodynamic stability  5/27/2022 0242 by Sebastian Martinez RN  Outcome: Progressing  5/27/2022 0242 by Sebastian Martinez RN  Outcome: Progressing  Flowsheets (Taken 5/26/2022 1934)  Maintains optimal cardiac output and hemodynamic stability:   Monitor blood pressure and heart rate   Monitor urine output and notify Licensed Independent Practitioner for values outside of normal range     Problem: Musculoskeletal - Adult  Goal: Return mobility to safest level of function  5/27/2022 0242 by Sebastian Martinez RN  Outcome: Progressing  5/27/2022 0242 by Sebastian Martinez RN  Outcome: Progressing  Goal: Return ADL status to a safe level of function  5/27/2022 0242 by Sebastian Martinez RN  Outcome: Progressing  5/27/2022 0242 by Sebastian Martinez RN  Outcome: Progressing     Problem: Infection - Adult  Goal: Absence of infection at discharge  5/27/2022 0242 by Sebastian Martinez RN  Outcome: Progressing  5/27/2022 0242 by Sebastian Martinez RN  Outcome: Progressing     Problem: Metabolic/Fluid and Electrolytes - Adult  Goal: Electrolytes maintained within normal limits  5/27/2022 0242 by Sebastian Martinez RN  Outcome: Progressing  5/27/2022 0242 by Sebastian Martinez RN  Outcome: Progressing     Problem: Respiratory - Adult  Goal: Achieves optimal ventilation and oxygenation  5/27/2022 0242 by Sebastian Martinez RN  Outcome: Progressing  5/27/2022 0242 by Sebastian Martinez RN  Outcome: Progressing     Problem: Genitourinary - Adult  Goal: Absence of urinary retention  5/27/2022 0242 by Sebastian Martinez RN  Outcome: Progressing  5/27/2022 0242 by Sebastian Martinez RN  Outcome: Progressing  Goal: Urinary catheter remains patent  5/27/2022 0242 by Sebastian Martinez RN  Outcome: Progressing  5/27/2022 0242 by Sebastian Martinez RN  Outcome: Progressing     Problem: Skin/Tissue Integrity - Adult  Goal: Skin integrity remains intact  5/27/2022 0242 by Lili Forbes RN  Outcome: Progressing  5/27/2022 0242 by Lili Forbes RN  Outcome: Progressing  Flowsheets (Taken 5/26/2022 1934)  Skin Integrity Remains Intact:   Monitor for areas of redness and/or skin breakdown   Assess vascular access sites hourly  Goal: Incisions, wounds, or drain sites healing without S/S of infection  5/27/2022 0242 by Lili Forbes RN  Outcome: Progressing  5/27/2022 0242 by Lili Forbes RN  Outcome: Progressing  Goal: Oral mucous membranes remain intact  5/27/2022 0242 by Lili Forbes RN  Outcome: Progressing  5/27/2022 0242 by Lili Forbes RN  Outcome: Progressing

## 2022-05-27 NOTE — PROGRESS NOTES
Family Medicine Progress Notes/Coverage    Today's Date: 5/27/22  4A-09/009-A  Medical Record # [id number]  CSN/Account # [de-identified]      Ms. Rod admitted on 5/23/2022        Subjective / Interval History :     Right shoulder pain, likes it needs to move  No HA, no CP., no SOB  Reviewed notes, consults, laboratory and radiology results,    Objective:       Physical Exam:  Patient Vitals for the past 24 hrs:   BP Temp Temp src Pulse Resp SpO2   05/27/22 0316 (!) 117/51 99.3 °F (37.4 °C) Oral 62 16 99 %   05/26/22 2332 (!) 125/52 98.4 °F (36.9 °C) Oral 56 18 98 %   05/1934 (!) 147/56 98.1 °F (36.7 °C) Oral 66 16 98 %   05/26/22 1631 (!) 161/64 97.7 °F (36.5 °C) Axillary 66 18 96 %   05/26/22 1128 (!) 142/60 97.7 °F (36.5 °C) Oral 58 16 98 %       General Appearance:  Alert, cooperative, no distress, appears stated age   HEENT:  Neck: Scalp wound dry , no drinage     Chest/Lungs:  Heart: CTA  RRR   Abdomen:  Back: sofr   Extremities:  Neurological Exam: No edema  WNL       Assessment:     Admitting Diagnosis:    Confusion [R41.0]  Intractable headache, unspecified chronicity pattern, unspecified headache type [R51.9]    Active Hospital Problems    Diagnosis Date Noted    Chronic atrial fibrillation ([facility] [I48.20] 05/24/2022     Priority: High    Breast 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 ([facility] [C50.919, C79.31] 05/24/2022     Priority: High    Confusion [R41.0] 05/23/2022     Priority: High    Brain metastases (HCC) [C79.31]      Priority: High    Status post craniotomy [Z98.890] 07/02/2021     Priority: High    Intractable headache [R51.9]      Priority: Medium    Essential hypertension [I10]      Priority: Medium    History of craniotomy [Z98.890] 07/2021     Priority: Medium    Hx of breast cancer with liver mets [Z85.3] 08/13/2020     Priority: Medium    Current chronic use of systemic steroids [Z79.52] 09/04/2018     Priority: Medium    Coronary artery disease involving native heart without angina pectoris [I25.10] 11/06/2017     Priority: Medium    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]      Priority: Medium    [facility] liver [facility] [C50.919, C78.7] 05/10/2016     Priority: Medium    Hypothyroidism due to acquired atrophy of thyroid [E03.4] 03/03/2016     Priority: Medium    Temporal arteritis ([facility] [M31.6]      Priority: Medium    GERD (gastroesophageal reflux disease) [K21.9]      Priority: Medium    Hypothyroidism [E03.9]      Priority: Medium       Plan:     Discussed plans with the nursing staff  Awaiting ECF placement    Medications, Laboratories and Imaging results:    Scheduled Meds:   cefTRIAXone (ROCEPHIN) IV  1,000 mg IntraVENous Q12H    amiodarone  200 mg Oral Daily    isosorbide mononitrate  30 mg Oral Daily    levETIRAcetam  500 mg Oral BID    levothyroxine  100 mcg Oral Daily    cetirizine  10 mg Oral Daily    metoprolol tartrate  12.5 mg Oral BID    multivitamin  1 tablet Oral Daily    pantoprazole  40 mg Oral QAM AC    predniSONE  1 mg Oral Daily    sertraline  50 mg Oral Daily    traZODone  50 mg Oral Nightly    sodium chloride flush  5-40 mL IntraVENous 2 times per day    mupirocin   Topical Daily     Continuous Infusions:   sodium chloride       PRN Meds:albuterol sulfate HFA, sodium chloride flush, sodium chloride, ondansetron **OR** ondansetron, polyethylene glycol, acetaminophen **OR** acetaminophen, HYDROcodone-acetaminophen    Imaging:    Lab Review :    Lab Results   Component Value Date    WBC 9.8 05/23/2022    HGB 10.9 (L) 05/23/2022    HCT 34.6 (L) 05/23/2022    MCV 89.6 05/23/2022     05/23/2022     Lab Results   Component Value Date    CREATININE 0.5 05/25/2022    BUN 9 05/25/2022     (L) 05/25/2022    K 4.2 05/25/2022     05/25/2022    CO2 25 05/25/2022     Lab Results   Component Value Date    CKTOTAL 70 06/28/2021     Lab Results   Component Value Date    ALT 63 05/23/2022    AST 85 (H) 05/23/2022    ALKPHOS 42 05/23/2022    BILITOT 0.4 05/23/2022     Lab Results   Component Value Date    LIPASE 20.4 05/06/2022         Electronically signed by Kyler Schultz MD on 5/27/22 at 8:19 AM AMELIA Ortiz M.D.

## 2022-05-27 NOTE — PROGRESS NOTES
[address]  Occupational Therapy  Daily Note  Time:   Time In: 6839  Time Out: 1022  Timed Code Treatment Minutes: 45 Minutes  Minutes: 45          Date: 2022  Patient Name: Krishna Baker,   Gender: female      Room: [facility]009-A  MRN: [id number]  : 1933  (80 y.o.)  Referring Practitioner: Francois Hernandez. Tashi Crisostomo MD  Diagnosis: Confusion  Additional Pertinent Hx: Per chart review, \"Latanya Valentine is a 80 y.o. female who presents to the emergency department for evaluation of increased confusion, headache, increased drainage from head wound. Patient has history of brain metastasis with craniotomy over a year ago, and procedure to drain subgaleal fluid. According to home health nurse, patient had increase drainage on her dressing today, as well as being more confused. Patient complaining of headache, unable to describe character or location. Patient is known to me, typically she is more arousable and interactive. The patient has no other acute complaints at this time. \"    Restrictions/Precautions:  Restrictions/Precautions: Fall Risk,General Precautions  Position Activity Restriction  Other position/activity restrictions: visual impairments, hx craniotomy 22      SUBJECTIVE: Pt seated in bed upon arrival, agreeable to OT session and motivated to participate. Pt requesting to use BSC. PAIN: 0/10:     Vitals: Oxygen: 92% on 2L O2 via NC throughout    COGNITION: Slow Processing, Decreased Recall, Decreased Insight, Impaired Memory, Decreased Problem Solving and Decreased Safety Awareness    ADL:   Lower Extremity Dressing: Maximum Assistance. doffing/donning depends  Toileting: Maximum Assistance.  hygiene after voiding/bm; significantly increased time for pt to finish having bm  Toilet Transfer: Minimal Assistance. to/from [facility]; completed X2 trials. BALANCE:  Sitting Balance:  Stand By Assistance. Standing Balance: Minimal Assistance.  with RW; mod-max cues for upright posture. Pt tolerated standing ~2 minutes during hygiene task    BED MOBILITY:  Supine to Sit: Minimal Assistance, with head of bed raised, with rail, with verbal cues , with increased time for completion    Scooting: Stand By Assistance      TRANSFERS:  Sit to Stand:  Minimal Assistance, with increased time for completion, cues for hand placement. Stand to Sit: [employer], with increased time for completion, cues for hand placement. **Completed X2 trials, requires hand over hand cueing for hand placement due to decreased vision    FUNCTIONAL MOBILITY:  Assistive Device: Rolling Walker  Assist Level:  Minimal Assistance. Distance: EOB>BSC; BSC>bedside chair  Slow pace, mod/max cues for posture, technique, safety, and navigating due to poor vision. ASSESSMENT:     Activity Tolerance:  Patient tolerance of  treatment: good. Discharge Recommendations: Subacute/skilled nursing facility  Equipment Recommendations: Equipment Needed: Yes  Mobility Devices: ADL Assistive Devices  Other: continue to assess  Plan: Times per Week: 5x  Times per Day: Daily  Current Treatment Recommendations: Strengthening,Functional mobility training,Endurance training,Equipment evaluation, education, & procurement,Patient/Caregiver education & training,Safety education & training,Cognitive reorientation,Self-Care / ADL,Balance training,Cognitive/Perceptual training,Neuromuscular re-education    Patient Education  Patient Education: Plan of Care, ADL's, Low Vision, Assistive Device Safety and transfer training    Goals  Short Term Goals  Time Frame for Short term goals: upon discharge  Short Term Goal 1: Patient will complete static standing for 3 minutes with CGA to increase ease with grooming and dressing. Short Term Goal 2: Patient will learn visual compensatory techniques with SBA and 1-2 verbal cues to increase ease with self care tasks.   Short Term Goal 3: Patient will complete UB ADL with CGA and LB ADL with MIN A and AE prn. Short Term Goal 4: Patient will complete functional ambulation short distances with CGA and minimal cues for navigation in prep for increasing ease with toileting. Following session, patient left in safe position with all fall risk precautions in place.

## 2022-05-28 VITALS
DIASTOLIC BLOOD PRESSURE: 42 MMHG | WEIGHT: 146.6 LBS | RESPIRATION RATE: 18 BRPM | OXYGEN SATURATION: 100 % | HEART RATE: 54 BPM | SYSTOLIC BLOOD PRESSURE: 137 MMHG | BODY MASS INDEX: 28.63 KG/M2 | TEMPERATURE: 97.5 F

## 2022-05-28 PROCEDURE — 97116 GAIT TRAINING THERAPY: CPT

## 2022-05-28 PROCEDURE — 6370000000 HC RX 637 (ALT 250 FOR IP): Performed by: EMERGENCY MEDICINE

## 2022-05-28 PROCEDURE — 97110 THERAPEUTIC EXERCISES: CPT

## 2022-05-28 PROCEDURE — 2580000003 HC RX 258: Performed by: EMERGENCY MEDICINE

## 2022-05-28 RX ADMIN — PREDNISONE 1 MG: 1 TABLET ORAL at 10:40

## 2022-05-28 RX ADMIN — LEVETIRACETAM 500 MG: 500 TABLET, FILM COATED ORAL at 10:41

## 2022-05-28 RX ADMIN — LEVOTHYROXINE SODIUM 100 MCG: 0.1 TABLET ORAL at 05:56

## 2022-05-28 RX ADMIN — PANTOPRAZOLE SODIUM 40 MG: 40 TABLET, DELAYED RELEASE ORAL at 05:56

## 2022-05-28 RX ADMIN — SERTRALINE 50 MG: 50 TABLET, FILM COATED ORAL at 10:41

## 2022-05-28 RX ADMIN — ISOSORBIDE MONONITRATE 30 MG: 30 TABLET, EXTENDED RELEASE ORAL at 10:41

## 2022-05-28 RX ADMIN — AMIODARONE HYDROCHLORIDE 200 MG: 200 TABLET ORAL at 10:41

## 2022-05-28 RX ADMIN — CETIRIZINE HYDROCHLORIDE 10 MG: 10 TABLET, FILM COATED ORAL at 10:41

## 2022-05-28 RX ADMIN — Medication 1 TABLET: at 10:41

## 2022-05-28 RX ADMIN — SODIUM CHLORIDE, PRESERVATIVE FREE 10 ML: 5 INJECTION INTRAVENOUS at 10:41

## 2022-05-28 RX ADMIN — MUPIROCIN: 20 OINTMENT TOPICAL at 13:11

## 2022-05-28 RX ADMIN — HYDROCODONE BITARTRATE AND ACETAMINOPHEN 1 TABLET: 7.5; 325 TABLET ORAL at 10:40

## 2022-05-28 ASSESSMENT — PAIN DESCRIPTION - FREQUENCY: FREQUENCY: CONTINUOUS

## 2022-05-28 ASSESSMENT — PAIN DESCRIPTION - ONSET: ONSET: ON-GOING

## 2022-05-28 ASSESSMENT — PAIN - FUNCTIONAL ASSESSMENT: PAIN_FUNCTIONAL_ASSESSMENT: ACTIVITIES ARE NOT PREVENTED

## 2022-05-28 ASSESSMENT — PAIN DESCRIPTION - DESCRIPTORS: DESCRIPTORS: ACHING

## 2022-05-28 ASSESSMENT — PAIN SCALES - GENERAL
PAINLEVEL_OUTOF10: 8
PAINLEVEL_OUTOF10: 0

## 2022-05-28 ASSESSMENT — PAIN DESCRIPTION - ORIENTATION: ORIENTATION: ANTERIOR;RIGHT

## 2022-05-28 ASSESSMENT — PAIN DESCRIPTION - PAIN TYPE: TYPE: ACUTE PAIN;SURGICAL PAIN

## 2022-05-28 ASSESSMENT — PAIN DESCRIPTION - LOCATION: LOCATION: HEAD

## 2022-05-28 NOTE — PROGRESS NOTES
Discharge teaching and instructions for diagnosis/procedure of Confusion completed with patient using teachback method. AVS reviewed. Printed prescriptions given to patient. Patient voiced understanding regarding prescriptions, follow up appointments, and care of self at home. Discharged via ambulance to assisted living per EMS transportation.

## 2022-05-28 NOTE — PROGRESS NOTES
[address] [place] [address]  INPATIENT PHYSICAL THERAPY  DAILY NOTE  [facility] 4A - 4A-09/009-A    Time In: 9909  Time Out: 2540  Timed Code Treatment Minutes: 25 Minutes  Minutes: 25          Date: 2022  Patient Name: Rajani Talamantes,  Gender:  female        MRN: [id number]  : 1933  (80 y.o.)     Referring Practitioner: Darren Rodney MD  Diagnosis: confusion  Additional Pertinent Hx: Per H&P : The patient is an 61-year-old female, whohad a known history of metastatic breast cancer, metastasized to theliver and also to the colon, and to the brain. The patient had adebulking of recurrent cancer of the breast and to the head by Ines Brewster, was re-operated on 2022. The patient subsequentlydeveloped a subgaleal fluid on the right parietal head where theincision is and sutures are, and has been aspirated twice during herlast hospitalization. The patient subsequently was discharged this pastFriday, 2022 and I have seen the patient that time and the patienthad a significant swelling on the right parietal head, the swelling isvery tense; however, it is not infected. The patient was discharged onthat day and at home, she was attended by the home health nurse,noticing more fluids coming out from the suture lines and the patientseems to be more confused. The patient was seen in the emergency roomand was noted to have a hyponatremia with the sodium of 124 for whichthe patient was hence admitted.      Prior Level of Function:  Lives With: Alone  Type of Home: House  Home Layout: One level  Home Access: Level entry  Home Equipment: Cane,Walker, rolling   Bathroom Shower/Tub: Tub/Shower unit  Bathroom Toilet: Handicap height  Bathroom Equipment: Shower chair  Bathroom Accessibility: Accessible    Receives Help From: Family  ADL Assistance: Independent  Homemaking Assistance: Independent  Homemaking Responsibilities: No  Ambulation Assistance: Needs assistance  Transfer Assistance: Needs assistance  Active : No  Additional Comments: Pt states her niece assisted with ambulation with RW at home since previous admission. States 24/7 assist at home, however, mentions her niece has back problems. Per RN, pt will likely not have 24/7 asssist at home. Restrictions/Precautions:  Restrictions/Precautions: Fall Risk,General Precautions  Position Activity Restriction  Other position/activity restrictions: visual impairments, hx craniotomy 5/11/22     SUBJECTIVE: RN approved therapy session. Patient supine in bed upon arrival. Patient pleasant and agreeable to therapy. PAIN: 9/10: head along incision. RN aware    Vitals: Vitals not assessed per clinical judgement, see nursing flowsheet    OBJECTIVE:  Bed Mobility:  Supine to Sit: Contact Guard Assistance, with head of bed raised, with increased time for completion    Transfers:  Sit to Stand: Minimal Assistance, with increased time for completion, cues for hand placement  Stand to Sit:Minimal Assistance, with increased time for completion    Ambulation:  Minimal Assistance  Distance: 3' from bed to chair  Surface: Level Tile  Device:Rolling Walker  Gait Deviations: Forward Flexed Posture, Slow Gillian, Decreased Step Length Bilaterally, Decreased Gait Speed, Decreased Heel Strike Bilaterally, Unsteady Gait and patient demonstrated initial posterior lean but able to correct with Leilani from therapist. Patient needed assistance with moving/ placement of rolling walker. Exercise:  Patient was guided in 1 set(s) 10 reps of exercise to both lower extremities. Seated marches, Seated heel/toe raises, Long arc quads and Seated abduction/adduction. Exercises were completed for increased independence with functional mobility. Functional Outcome Measures: Completed  -PAC Inpatient Mobility Raw Score : 14  -PAC Inpatient T-Scale Score : 38.1    ASSESSMENT:  Assessment: Patient progressing toward established goals.   Activity Tolerance:  Patient tolerance of  treatment: good. Patient limited by vision, decreased strength and decreased endurance. Equipment Recommendations:Equipment Needed: No (pt has a RW, continue to assess needs)  Discharge Recommendations: Continue to assess pending progress, Subacte/Skilled Nursing Facility, 24 hour assistance or supervision and Patient would benefit from continued PT at discharge  Plan: Current Treatment Recommendations: Strengthening,Balance training,Functional mobility training,Transfer training,Gait training,Endurance training,Neuromuscular re-education,Home exercise program,Safety education & training,Patient/Caregiver education & training,Equipment evaluation, education, & procurement,Therapeutic activities  Plan:  (6x N)    Patient Education  Patient Education: Gait, Verbal Exercise Instruction    Goals:  Patient goals : \"no\"  Short Term Goals  Time Frame for Short term goals: by hospital d/c  Short term goal 1: Pt to demo supine <-> sit with S for ability to get in and out of bed easily  Short term goal 2: Pt to complete sit <->stand with RW with SBA for improved safety with getting up from surfaces  Short term goal 3: Pt to ambulate >=20' with RW and CGA for improved ability to move safely in her environment  Long Term Goals  Time Frame for Long term goals : NA due to short ELOS    Following session, patient left in safe position with all fall risk precautions in place.

## 2022-05-28 NOTE — PROGRESS NOTES
This RN spoke with patient's niece Fernanda Tri regarding patient's status and plan of care. All questions answered at this time. Fernanda Bartlett updated that Yessenia Novak is transported to [facility] at this time.

## 2022-05-28 NOTE — PROGRESS NOTES
Report called to [facility]. All questions answered at this time. AVS and last dose MAR faxed at this time.

## 2022-05-28 NOTE — PROGRESS NOTES
Family Medicine Progress Notes/Coverage    Today's Date: 5/28/22  4A-09/009-A  Medical Record # [id number]  CSN/Account # [de-identified]      Ms. Rod admitted on 5/23/2022        Subjective / Interval History :     Seen with the PT at bedside  doing well, No SOB, No chest pain , No fatigue.  No nausea nor abdominal pain, No HA  Reviewed notes, consults, laboratory and radiology results,    Objective:       Physical Exam:  Patient Vitals for the past 24 hrs:   BP Temp Temp src Pulse Resp SpO2   05/28/22 0415 (!) 107/52 97.2 °F (36.2 °C) Axillary 52 18 99 %   05/28/22 0130 (!) 100/50        05/28/22 0047 (!) 117/45 97.6 °F (36.4 °C) Oral 53 16 98 %   05/27/22 2044 (!) 132/53 98.6 °F (37 °C) Oral 59 16 97 %   05/27/22 1615 (!) 121/56 98 °F (36.7 °C) Oral 57 18 96 %   05/27/22 1147 (!) 131/47 98 °F (36.7 °C) Oral 67 18 96 %   05/27/22 1015 (!) 131/56 98 °F (36.7 °C) Oral 69 18 94 %       General Appearance:  Alert, cooperative, no distress, appears stated age   HEENT:  Neck: scalp wound healing well     Chest/Lungs:  Heart: CTA  RRR   Abdomen:  Back: soft   Extremities:  Neurological Exam: No edema  WNL       Assessment:     Admitting Diagnosis:    Confusion [R41.0]  Intractable headache, unspecified chronicity pattern, unspecified headache type [R51.9]    Active Hospital Problems    Diagnosis Date Noted    Chronic atrial fibrillation ([facility] [I48.20] 05/24/2022     Priority: High    Breast 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 ([facility] [C50.919, C79.31] 05/24/2022     Priority: High    Confusion [R41.0] 05/23/2022     Priority: High    Brain metastases [facility]) [C79.31]      Priority: High    Status post craniotomy [Z98.890] 07/02/2021     Priority: High    Intractable headache [R51.9]      Priority: Medium    Essential hypertension [I10]      Priority: Medium    History of  (L) 05/25/2022    K 4.2 05/25/2022     05/25/2022    CO2 25 05/25/2022     Lab Results   Component Value Date    CKTOTAL 70 06/28/2021     Lab Results   Component Value Date    ALT 63 05/23/2022    AST 85 (H) 05/23/2022    ALKPHOS 42 05/23/2022    BILITOT 0.4 05/23/2022     Lab Results   Component Value Date    LIPASE 20.4 05/06/2022         Electronically signed by Leigh Fallon MD on 5/28/22 at 8:49 AM EDT                                                                                           Rodney Rubalcava M.D.

## 2022-05-31 NOTE — CARE COORDINATION
5/31/22, 7:10 AM EDT    Patient goals/plan/ treatment preferences discussed by  and . Patient goals/plan/ treatment preferences reviewed with patient/ family. Patient/ family verbalize understanding of discharge plan and are in agreement with goal/plan/treatment preferences. Understanding was demonstrated using the teach back method. AVS provided by RN at time of discharge, which includes all necessary medical information pertaining to the patients current course of illness, treatment, post-discharge goals of care, and treatment preferences. Services At/After Discharge: [facility] ([facility]) and [facility]- [facility]       IMM Letter  IMM Letter given to Patient/Family/Significant other/Guardian/POA/by[de-identified]   IMM Letter date given[de-identified] 05/25/22  IMM Letter time given[de-identified] Elida Beverly was discharged over the weekend to [facility], where she will be skilled under her Medicare benefit. She was transported by ambulance on Saturday 5/28. Spoke to Transitional Care Nurse Oswaldo Colindres and updated her on Latanya's discharge to the facility.

## 2022-06-03 ENCOUNTER — HOSPITAL ENCOUNTER (OUTPATIENT)
Dept: MRI IMAGING | Age: 87
Discharge: HOME | End: 2022-06-03
Payer: MEDICARE

## 2022-06-03 DIAGNOSIS — Z86.79: ICD-10-CM

## 2022-06-03 PROCEDURE — A9579 GAD-BASE MR CONTRAST NOS,1ML: HCPCS

## 2022-06-03 PROCEDURE — 6360000004 HC RX CONTRAST MEDICATION

## 2022-06-03 PROCEDURE — 70553 MRI BRAIN STEM W/O & W/DYE: CPT

## 2022-06-03 RX ADMIN — GADOTERIDOL 10 ML: 279.3 INJECTION, SOLUTION INTRAVENOUS at 13:20

## 2022-06-06 ENCOUNTER — OFFICE VISIT (OUTPATIENT)
Dept: NEUROSURGERY | Age: 87
End: 2022-06-06
Payer: MEDICARE

## 2022-06-06 VITALS
BODY MASS INDEX: 23.92 KG/M2 | DIASTOLIC BLOOD PRESSURE: 60 MMHG | HEIGHT: 62 IN | SYSTOLIC BLOOD PRESSURE: 122 MMHG | WEIGHT: 130 LBS

## 2022-06-06 DIAGNOSIS — Z98.890 STATUS POST CRANIOTOMY: ICD-10-CM

## 2022-06-06 DIAGNOSIS — I61.9 INTRAPARENCHYMAL HEMORRHAGE OF BRAIN (HCC): Primary | ICD-10-CM

## 2022-06-06 DIAGNOSIS — G93.9 BRAIN LESION: ICD-10-CM

## 2022-06-06 PROCEDURE — 1111F DSCHRG MED/CURRENT MED MERGE: CPT | Performed by: PHYSICIAN ASSISTANT

## 2022-06-06 PROCEDURE — G8420 CALC BMI NORM PARAMETERS: HCPCS | Performed by: PHYSICIAN ASSISTANT

## 2022-06-06 PROCEDURE — 1090F PRES/ABSN URINE INCON ASSESS: CPT | Performed by: PHYSICIAN ASSISTANT

## 2022-06-06 PROCEDURE — 1036F TOBACCO NON-USER: CPT | Performed by: PHYSICIAN ASSISTANT

## 2022-06-06 PROCEDURE — G8427 DOCREV CUR MEDS BY ELIG CLIN: HCPCS | Performed by: PHYSICIAN ASSISTANT

## 2022-06-06 PROCEDURE — 1123F ACP DISCUSS/DSCN MKR DOCD: CPT | Performed by: PHYSICIAN ASSISTANT

## 2022-06-06 PROCEDURE — 99204 OFFICE O/P NEW MOD 45 MIN: CPT | Performed by: PHYSICIAN ASSISTANT

## 2022-06-06 NOTE — PROGRESS NOTES
[address] [place], [place] [address]n[place] [id number]  Dept: [phone]  Dept Fax: [phone]  Loc: Julianne Arango 1166 Follow Visit  Visit Date: 6/6/2022      Timmy Mcdonnell  is a 80 y.o. female who is returning to the office today for a post-op follow-up visit. She had surgery on 5/11/2022 with Dr. 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 Josefina Mathis locum covering for Dr. iMlo Salinas in his absence, on 5/27/2022 where she presented without complaints with subgaleal fluid practically resolved and no further leakage with sutures recommended to remain in place for an additional week. She presents today with an MRI of the brain imaged on 6/3/2022 without contrast which reveals postoperative changes along with a large cavity lesion from the surgical site measuring 4.3 x 3.5 x 2.8 cm EXTR axial fluid collection is also noted. Some enhancement along the anterior aspect of the cavity is suspicious for residual tumor with extensive surrounding edema and mild mass-effect noted. At today's visit she arrives accompanied by her children and in good spirits. She presents with clear appropriate speech and participated in discussions involving her care appropriately. Her surgical incision was inspected and is healing nicely enough to warrant removal of the sutures. No discharge or underlying fluctuance was noted as the incision is flat and dry. (Following suture removal some concerns for possible dehiscence are noted proximally) and a referral to wound care for evaluation will be processed following today's visit. She is encouraged to wait a couple of days after suture removal for showering or shampooing and to notify the office with any concerns involving changes to the incision site over the following days and weeks.   A brief review of the MRI with a more detailed evaluation pending the return of Dr. Payam Valdivia on Friday. Is otherwise stable and intact her baseline on exam with no additional significant changes noted. She remains very happy with the outcome of the procedure and was without complaints of headache, dizziness, visual changes or disturbances or significant issues with gait or imbalance. She is currently residing in a nursing home where she receives regular physical and Occupational Therapy but she states that she is benefiting from. Based on the stable intact nature of her exam and the progress towards healing of the incision sites we have agreed to follow-up with her in 2 weeks to ascertain continued healing of the incision and the more thoroughly review the latest MRI findings with Dr. Kathe Garcia input. She and her family are very happy with her surgery and with today's appointment having question concerns addressed and answered and they look forward to their next follow-up in 2 weeks.     · Patient was evaluated today and is doing well overall. · No new complaints were voiced. · Patient  lives in a skilled nursing facility  · Wound: wound healing as expected  · Follow-up Studies: No orders of the defined types were placed in this encounter. ·      Assessment/Plan:  · Status Post craniotomy and reexploration for resection of recurrent intercranial lesion performed by Dr. Payam Valdivia, without complication  · Doing well overall  · Encouraged gradual increase in physical and mental activity. · Fall precaution and home safety education provided to patient. · Follow-up: 2-week follow-up to ascertain continued progress towards healing of the incision and for more detailed review of the latest MRI imaging following input from the neurosurgeon.   A follow-up appointment with oncology is processed along with a appointment to wound care for evaluation of the incision to rule out any dehiscence or complications      Electronically signed by Calli Mcdonald PA-C on 6/6/22 at 1:46 PM EDT

## 2022-06-07 ENCOUNTER — FOLLOWUP TELEPHONE ENCOUNTER (OUTPATIENT)
Dept: PALLATIVE CARE | Age: 87
End: 2022-06-07

## 2022-06-07 NOTE — TELEPHONE ENCOUNTER
Prabhjot Faustin       5/29/1933    MRN [id number]    Visit completed in facility this date. Patient resides at [facility], room 405. Staff understands RN palliative visit is a hands-free assessment in accordance with [address] [place] [address] and non-billable. Staff denies patient having fever/chills, cough, SOB, breathing difficulties, new fatigue, muscle aches, headache, loss of tast or smell, sore throat, congestion/runny nose, nausea/vomiting or diarrhea. Referral for palliative care received from Juvenal Lopez RN, inpatient hospice liaison. PCP:  Dr. 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 No change in weight reported. HEENT:  Denies ear pain, tinnitus, hearing loss, trouble swallowing, or sore throat. Blind right eye. Respiratory:  Normal effort at rest, no use of accessory muscles. No SOB at rest but reports some with ambulating. Has oxygen, home oxygen as well. No audible adventitious lung sounds. No cough or apnea noted. Cardiovascular:  Negative for edema. Denies dizziness, chest pain or orthopnea. Musculoskeletal:  Denies joint pain or swelling, back pain. Positive for gait disturbance, difficulty walking in a straight line. Cannot drive, niece Rashmi helps. Brother Hilary Lawson. Most of family in [place]. Uses a walker. Gastrointestinal:  Abdomen without distention. Continent of bowel. Denies nausea, vomiting, blood in stool, diarrhea, constipation. Urological:  Continent of urine most of the time. Negative for difficult or painful urination, flank pain, or change in frequency or urgency. Integumentary:  Denies itching, color changes, rash. Has shaved hair and stitches right side of head. Psychiatric/Behavioral Positive for some confusion. Negative for behaviors. Speech normal.  Appropriate responses. Responds  for year, cannot recall President but \"doesn't like him, \" states .  She answered most questions appropriately but did go on tangents and did not understand difference in [place]  and financial POA. Patient stated \"my brother cared for me when my  and son . \"  Patient answered several questions with Rosanaellyn Gosselin I lost a lot of my memory after this operation, but I think some will come back. \"  Neurological:  Denies headaches. Negative for tremors, seizures or numbness. Vitals/Weights  Most recent:      5'2\"  #130 on 22, BMI  23.78. States norm around #135     ADVANCE CARE PLANNING:  Current code status: DNR-CCA, verified with nurse on unit. On file in [facility] [address] as well. Has HCPOA and Living Will documents on file. Naz Warner is primary at this time. She would like to update her HCPOA, but with conversation kept reverting back to conversation about a will and finances/assets and how they \"treated her dog and her aid. \"  Recent craniotomy and unsure if cognition will improve over next couple weeks. I will let her brother know we can re-address this. She will need a  for her finances, as she wishes for her grandson to have her house. I attempted to call Jones Gomez, brother and 1 number had been disconnected. No answer on the local number listed in Epic. I wanted to offer education to him, as he will be the primary care giver. Call placed back to [facility] to let her know I do not feel patient can complete legal documents this date and to update her about insurance. Summary of Chart Review:       PROBLEMS/PLAN:    1. Deconditioning: Patient has history of breast Ca with liver and brain mets. She had a recent craniotomy and is at facility for therapy. She is on PT, OT, ST (for cognition). At this time, she cannot complete ADLs on her own. Per therapy, she has difficulty walking a straight line due to her right eye blindness.   Patient reports this as new since surgery. Patient has Medicaid, Passport services. Darryl Simpson an aid goes 3 hours a day M-F. Care worker is Angélica Villeda. I confirmed information with Damion Fowler, . 2. Symptom management:  Patient  Has SOB with ambulating. States had oxygen at home as well. Only pain is gum pain from \"ill fitting teeth. \"    3. Palliative Care Encounter:  Patient participating with therapy, cooperative. She has Medicare A and has orders for a few more weeks. Staff unsure if they will order more weeks. Patient anxious to go home and have brother assist her. Per HCPOA, patient would benefit from having a nurse practitioner see her at home. [facility] [place] aware our agency does not have one on the team, but that we work with the PCP. Jen's mom has a CNP that visits with palliative care so she is well aware of the program. Patient's goals not in line with hospice at this time. I collaborate with the PCP, medical director of palliative care, [facility] home infusion pharmacy/facility pharmacy and [facility] registered dietitians to achieve team-based health care. Orders are provided by the PCP. On this date 6/7/2022/ I have spent 120 minutes reviewing electronic chart, visiting with patient, speaking with staff, updating family and documenting. An electronic signature was used to authenticate this note.      Dara Mcguire RN, BSN, [address] Palliative Care Liaison  [facility]

## 2022-06-08 ENCOUNTER — TELEPHONE (OUTPATIENT)
Dept: WOUND CARE | Age: 87
End: 2022-06-08

## 2022-06-09 ENCOUNTER — TELEPHONE (OUTPATIENT)
Dept: WOUND CARE | Age: 87
End: 2022-06-09

## 2022-06-14 DIAGNOSIS — Z98.890 STATUS POST CRANIOTOMY: ICD-10-CM

## 2022-06-14 NOTE — TELEPHONE ENCOUNTER
Latanya Rod called requesting a refill on the following medications:  Requested Prescriptions     Pending Prescriptions Disp Refills    HYDROcodone-acetaminophen (NORCO) 5-325 MG per tablet 18 tablet 0     Sig: Take 2 tablets by mouth every 8 hours as needed for Pain for up to 3 days. [facility]   . pv      Date of last visit:   Date of next visit (if applicable): 1/53/7269

## 2022-06-15 ENCOUNTER — CARE COORDINATION (OUTPATIENT)
Dept: CARE COORDINATION | Age: 87
End: 2022-06-15

## 2022-06-15 ENCOUNTER — CARE COORDINATION (OUTPATIENT)
Dept: CASE MANAGEMENT | Age: 87
End: 2022-06-15

## 2022-06-15 NOTE — CARE COORDINATION
[facility] Transitions Initial Follow Up Call    Call within 2 business days of discharge: Yes    Patient: Matthew Haines Patient : 1933   MRN: <[id number]>  Reason for Admission: Intractable headache   Discharge Date: 22 RARS: Readmission Risk Score: 24.4 ( )      Last Discharge [facility]       Complaint Diagnosis Description Type Department Provider    22 Headache; Wound Check Intractable headache, unspecified chronicity pattern, unspecified headache type . .. ED to Hosp-Admission (Discharged) (ADMITTED) Joy Kamara MD; Samantha Lorenzo . .. Acute Care Course: Patient admitted to [facility]. [facility] - for complications related to Brain Mass. Discharged to home with [facility]. She re-admitted to [facility]. [facility] - for intractable headache. [facility] for [facility]hab -. HFU made:      Sig Hx: Confusion, R parietal head seepage    DME:     Conversation: Attempted to contact patient/family for initial transitional care call. Unable to reach. Writer left a hipaa compliant message representing [facility]), introducing of self, purpose of the call and contact information for a return call. Follow up plan: Will re-attempt     Non-face-to-face services provided:           Follow Up  Future Appointments   Date Time Provider Irene Shah   2022 10:30 AM Yael Villalpando, APRN -  [facility]   2022  4:00 PM Rosa Lee, APRN - [address]   2022 11:00 AM MD CLAUDIA [employer] [facility]       Odalys Metz RN

## 2022-06-15 NOTE — CARE COORDINATION
I spoke with Garrick at [facility] who confirmed patient discharged home on 6/14 with [facility] [address].     I spoke with Katy Pham at [facility] who confirmed [facility] is scheduled for today. Will notify CTN at this time.

## 2022-06-16 ENCOUNTER — CARE COORDINATION (OUTPATIENT)
Dept: CASE MANAGEMENT | Age: 87
End: 2022-06-16

## 2022-06-16 NOTE — CARE COORDINATION
Miralax. She stated that there have been some family issues and her aunt does not want Jimenez Friar or Storm Rebel to receive information regarding her aunt. Asked to speak to [facility]. [facility] stated she definitely does not want Jimenez Friar or Storm Rebel to be given any information. States there has been some significant family issues and she is very upset with them She states that Hilda Mcclelland, her brother, is her primary decision maker and ok to give info to Lolly Villafana and Julia. Educated her that she needs to obtain paperwork when she goes to see Dr Jorje Jimenez and it is important for her to have a HCPOA document since there are family issues. Per family and pt she is doing really well and aware of upcoming appts. Knows s/s to call PCP. Sent PCP note to give pt ACP docs.       Follow up plan: Will handoff to Jasmina Guillermo for f/u with ACP              Spoke with: [facility] and [address] [place] [address],[address]: [facility] services provided:  Education of patient/family/caregiver/guardian to support self-management-home safety including walker safty. Constipation. Transitions of Care Initial Call    Was this an external facility discharge? No Discharge Facility: [facility]    Challenges to be reviewed by the provider   Additional needs identified to be addressed with provider: Yes  ACP docs need addressed. Method of communication with provider : chart routing    Advance Care Planning:   Does patient have an Advance Directive: reviewed and current. Pt wants to change 2019 doc. Wants Hilda Mcclelland to be [employer]. Care Transition Nurse contacted the patient by telephone to perform post hospital discharge assessment. Verified name and  with patient as identifiers. Provided introduction to self, and explanation of the CTN role. CTN reviewed discharge instructions, medical action plan and red flags with patient who verbalized understanding.  Patient given an opportunity to ask questions and does not have any further questions or concerns at this time. Were discharge instructions available to patient? Yes. Reviewed appropriate site of care based on symptoms and resources available to patient including: PCP. The patient agrees to contact the PCP office for questions related to their healthcare. Medication reconciliation was performed with patient, who verbalizes understanding of administration of home medications. Advised obtaining a 90-day supply of all daily and as-needed medications. Was patient discharged with a pulse oximeter? no    CTN provided contact information. Plan for follow-up call in 5-7 days based on severity of symptoms and risk factors.   Plan for next call: referral to [facility]ulatory care [facility] 24 Hour Call    Do you have all of your prescriptions and are they filled?: Yes  Post Acute Services: Home Health (Comment: [facility])  Care Transitions Interventions         Follow Up  Future Appointments   Date Time Provider Irene Shah   6/17/2022 10:30 AM Fabiola Villalpando, APRN -  [facility]   6/21/2022  4:00 PM Christie Corrales[address]   6/28/2022 11:00 AM Kyler Schultz MD [facility]       DANYELL GutierrezN, RN   [address] [facility] 45 Transition Nurse  [phone]

## 2022-06-20 ENCOUNTER — TELEPHONE (OUTPATIENT)
Dept: FAMILY MEDICINE CLINIC | Age: 87
End: 2022-06-20

## 2022-06-20 ENCOUNTER — HOSPITAL ENCOUNTER (OUTPATIENT)
Dept: WOUND CARE | Age: 87
Discharge: HOME | End: 2022-06-20
Payer: MEDICARE

## 2022-06-20 VITALS
OXYGEN SATURATION: 97 % | SYSTOLIC BLOOD PRESSURE: 152 MMHG | DIASTOLIC BLOOD PRESSURE: 70 MMHG | HEART RATE: 64 BPM | TEMPERATURE: 97.2 F | RESPIRATION RATE: 18 BRPM

## 2022-06-20 DIAGNOSIS — Z98.890 HISTORY OF CRANIOTOMY: Primary | ICD-10-CM

## 2022-06-20 DIAGNOSIS — T81.31XA DEHISCENCE OF OPERATIVE WOUND, INITIAL ENCOUNTER: ICD-10-CM

## 2022-06-20 DIAGNOSIS — T81.89XA NON-HEALING SURGICAL WOUND, INITIAL ENCOUNTER: ICD-10-CM

## 2022-06-20 PROCEDURE — 99213 OFFICE O/P EST LOW 20 MIN: CPT | Performed by: NURSE PRACTITIONER

## 2022-06-20 PROCEDURE — 99213 OFFICE O/P EST LOW 20 MIN: CPT

## 2022-06-20 RX ORDER — BETAMETHASONE DIPROPIONATE 0.05 %
OINTMENT (GRAM) TOPICAL ONCE
Status: CANCELLED | OUTPATIENT
Start: 2022-06-20 | End: 2022-06-20

## 2022-06-20 RX ORDER — LIDOCAINE 50 MG/G
OINTMENT TOPICAL ONCE
Status: CANCELLED | OUTPATIENT
Start: 2022-06-20 | End: 2022-06-20

## 2022-06-20 RX ORDER — GINSENG 100 MG
CAPSULE ORAL ONCE
Status: CANCELLED | OUTPATIENT
Start: 2022-06-20 | End: 2022-06-20

## 2022-06-20 RX ORDER — BACITRACIN ZINC AND POLYMYXIN B SULFATE 500; 1000 [USP'U]/G; [USP'U]/G
OINTMENT TOPICAL ONCE
Status: CANCELLED | OUTPATIENT
Start: 2022-06-20 | End: 2022-06-20

## 2022-06-20 RX ORDER — CLOBETASOL PROPIONATE 0.5 MG/G
OINTMENT TOPICAL ONCE
Status: CANCELLED | OUTPATIENT
Start: 2022-06-20 | End: 2022-06-20

## 2022-06-20 RX ORDER — LIDOCAINE 40 MG/G
CREAM TOPICAL ONCE
Status: CANCELLED | OUTPATIENT
Start: 2022-06-20 | End: 2022-06-20

## 2022-06-20 RX ORDER — GENTAMICIN SULFATE 1 MG/G
OINTMENT TOPICAL ONCE
Status: CANCELLED | OUTPATIENT
Start: 2022-06-20 | End: 2022-06-20

## 2022-06-20 RX ORDER — LIDOCAINE HYDROCHLORIDE 20 MG/ML
JELLY TOPICAL ONCE
Status: CANCELLED | OUTPATIENT
Start: 2022-06-20 | End: 2022-06-20

## 2022-06-20 RX ORDER — BACITRACIN, NEOMYCIN, POLYMYXIN B 400; 3.5; 5 [USP'U]/G; MG/G; [USP'U]/G
OINTMENT TOPICAL ONCE
Status: CANCELLED | OUTPATIENT
Start: 2022-06-20 | End: 2022-06-20

## 2022-06-20 RX ORDER — LIDOCAINE HYDROCHLORIDE 40 MG/ML
SOLUTION TOPICAL ONCE
Status: CANCELLED | OUTPATIENT
Start: 2022-06-20 | End: 2022-06-20

## 2022-06-20 RX ORDER — HYDROCODONE BITARTRATE AND ACETAMINOPHEN 5; 325 MG/1; MG/1
2 TABLET ORAL EVERY 8 HOURS PRN
Qty: 18 TABLET | Refills: 0 | OUTPATIENT
Start: 2022-06-20 | End: 2022-06-23

## 2022-06-20 ASSESSMENT — PAIN SCALES - GENERAL: PAINLEVEL_OUTOF10: 10

## 2022-06-20 ASSESSMENT — PAIN DESCRIPTION - DESCRIPTORS: DESCRIPTORS: ACHING

## 2022-06-20 ASSESSMENT — PAIN - FUNCTIONAL ASSESSMENT: PAIN_FUNCTIONAL_ASSESSMENT: ACTIVITIES ARE NOT PREVENTED

## 2022-06-20 ASSESSMENT — PAIN DESCRIPTION - LOCATION: LOCATION: HEAD

## 2022-06-20 NOTE — TELEPHONE ENCOUNTER
Patient calling in to check on this, she is aware of her appt 6/21/2022, but she is out of pain medication today 6/20/22.

## 2022-06-20 NOTE — TELEPHONE ENCOUNTER
Monitor. Sobeida from [facility] home [facility] called stating pt need's to be back on the biotin and vitamin d2 she also stated that if the pt  has to pay out of pocket pt won't get them.

## 2022-06-20 NOTE — PROGRESS NOTES
[facility] and Procedure Note      Elridge Mcardle  MEDICAL RECORD NUMBER:  [id number]  AGE: 80 y.o. GENDER: female  : 1933  EPISODE DATE:  2022    SUBJECTIVE:     Chief Complaint   Patient presents with    Wound Check     craniotomy incision         HISTORY OF PRESENT ILLNESS      Elridge Mcardle is a 80 y.o. female who presents today for evaluation of nonhealing surgical wound to head. Patient is s/p re-exploration craniotomy and resection of recurrent brain lesion per Dr. Jonny Mo 22. At follow up visit  with PA for suture removal surgical wound dehiscence was noted to proximal portion of wound prompting referral to clinic today. Patient has been leaving area open to air, states that she has aid assist with shower weekly, has been washing with shampoo. She notes difficulties with her short term memory post operatively and headaches. She is current with home health-weekly visits per nursing and daily aide. States she lives with her brother. Patient is legally blind, ambulates with walker. She denies any fevers or chills. Denies any further needs or concerns.     PAST MEDICAL HISTORY             Diagnosis Date    Anxiety     Blind right eye     Breast cancer ([facility] 2016    [facility] only chemo, no surgery    Breast cancer metastasized to liver () and brain () ([facility] [address] 05/10/2016    Liver lesion noted  : Brain     Carotid stenosis      Left ICA 25%    Colostomy in place [facility]) 2016    Degenerative arthritis of cervical spine 2007    GERD (gastroesophageal reflux disease) 2006    History of craniotomy 2021    mets from breast CA    Hx antineoplastic chemo     left breast cancer, no surgery    Hypercholesteremia     Hypoestrogenism     Hypothyroidism     Lumbago     Lumbosacral spinal stenosis 2019    MDRO (multiple drug resistant organisms) resistance     pt stated cleared    Metastasis ([facility] [address] 2019    from breast to brain    Personal history of cardiac dysrhythmia     Respiratory tract infection due to COVID-19 virus 01/21/2022    Sigmoid diverticulosis 08/2004    Spondylolisthesis of lumbosacral region 08/2004    Steroid-induced hyperglycemia     Subclavian artery stenosis (HCC)     left    Superior and Inferior Pubic ramus fracture, right, closed, initial encounter ([facility] [address] 05/21/2019    SVT (supraventricular tachycardia) ([facility] [address] 06/2007    Temporal arteritis ([facility] [address]     Vertebral artery occlusion     left    Vitamin D deficiency 06/2017       PAST SURGICAL HISTORY     Past Surgical History:   Procedure Laterality Date    CARDIAC CATHETERIZATION  5/07    CATARACT REMOVAL  right 1/08; left 2/08    CHOLECYSTECTOMY  1/03    COLONOSCOPY  11/06    COLOSTOMY  09/04/2018    REVERSAL OF COLOSTOMY    CRANIOTOMY Right 7/2/2021    CRANIOTOMY FOR RESECTION/DEBULKING OF RIGHT SIDE INTRA BRAIN TUMOR performed by Radha Baldwin MD at [facility] Right 5/11/2022    RIGHT SIDED CRANIOTOMY FOR TUMOR performed by Radha Baldwin MD at [facility]      ENDOSCOPY, COLON, DIAGNOSTIC      EYE REMOVAL Right 03/2017    EYE SURGERY      [facility]06[facility] [address]      partial    OTHER SURGICAL HISTORY  05/27/2016    robotic assisted laparoscopic anterior colon resection and end colostomy    LA OFFICE/OUTPT VISIT,PROCEDURE ONLY N/A 9/4/2018    COLOSTOMY REVERSAL AND PARASTOMAL HERNIA REPAIR performed by Dereje Weeks MD at [address] / PULMONARY SHUNT  2002    UPPER GASTROINTESTINAL ENDOSCOPY  11/06     BREAST NEEDLE BIOPSY LEFT Left 05/23/2016    [facility]       FAMILY HISTORY     Family History   Problem Relation Age of Onset    Breast Cancer Sister 61        breast    Cancer Brother 79        lung    Cancer Brother 68        colon    Cancer Son 48        lung       SOCIAL HISTORY     Social History Tobacco Use    Smoking status: Former Smoker     Packs/day: 0.50     Types: Cigarettes    Smokeless tobacco: Never Used   Vaping Use    Vaping Use: Never used   Substance Use Topics    Alcohol use: No    Drug use: No       ALLERGIES     Allergies   Allergen Reactions    Bactrim [Sulfamethoxazole-Trimethoprim] Swelling     Of tongue    Demerol Rash     nausea    Pcn [Penicillins] Rash       MEDICATIONS     Current Outpatient Medications on File Prior to Encounter   Medication Sig Dispense Refill    amiodarone (CORDARONE) 200 MG tablet Take 1 tablet by mouth daily 30 tablet 0    metoprolol tartrate (LOPRESSOR) 25 MG tablet Take 0.5 tablets by mouth 2 times daily 60 tablet 3    levothyroxine (SYNTHROID) 100 MCG tablet take 1 tablet by mouth once daily 90 tablet 0    ALPRAZolam (XANAX) 0.25 MG tablet take 1 tablet by mouth twice a day if needed for anxiety 60 tablet 2    isosorbide mononitrate (IMDUR) 30 MG extended release tablet take 1 tablet by mouth once daily 90 tablet 1    simvastatin (ZOCOR) 20 MG tablet take 1 tablet by mouth every evening 90 tablet 1    docusate (COLACE, DULCOLAX) 100 MG CAPS Take 100 mg by mouth 2 times daily 60 capsule 1    Nebulizers (COMPRESSOR/NEBULIZER) MISC 1 Device by Does not apply route 4 times daily as needed (Shortness of breath and wheezing) 1 each 3    albuterol (PROVENTIL) (2.5 MG/3ML) 0.083% nebulizer solution Take 3 mLs by nebulization every 6 hours as needed for Wheezing 120 each 3    polyethylene glycol (GLYCOLAX) 17 GM/SCOOP powder Take 17 g by mouth daily as needed (constipation) 510 g 5    pantoprazole (PROTONIX) 40 MG tablet take 1 tablet by mouth once daily 90 tablet 1    zinc sulfate (ZINCATE) 220 (50 Zn) MG capsule Take 1 capsule by mouth daily 30 capsule 0    albuterol sulfate  (90 Base) MCG/ACT inhaler Inhale 2 puffs into the lungs every 6 hours as needed for Wheezing or Shortness of Breath 18 g 3    predniSONE (DELTASONE) 1 MG tablet Take 1 tablet by mouth daily - resume after completes decadron 60 tablet 2    levETIRAcetam (KEPPRA) 500 MG tablet take 1 tablet by mouth twice a day 240 tablet 2    loratadine (CLARITIN) 10 MG tablet take 1 tablet by mouth once daily 90 tablet 1    REFRESH PLUS 0.5 % SOLN ophthalmic solution use as directed      ondansetron (ZOFRAN-ODT) 4 MG disintegrating tablet Take 4 mg by mouth every 8 hours as needed      traZODone (DESYREL) 50 MG tablet take 1 tablet by mouth at bedtime if needed for sleep 30 tablet 5    lidocaine 4 % external patch Apply 2 patches topically daily 3 box 1    melatonin 3 MG TABS tablet Take 1 tablet by mouth nightly as needed (insomnia) 30 tablet 3    Nutritional Supplements (ENSURE ORIGINAL) LIQD Take 1 Can by mouth 2 times daily 60 Bottle 5    sertraline (ZOLOFT) 50 MG tablet take 1 tablet by mouth twice a day 180 tablet 1    Calcium Carbonate-Vitamin D (OYSTER SHELL CALCIUM/D) 500-200 MG-UNIT TABS take 1 tablet by mouth twice a day 180 tablet 3    promethazine (PHENERGAN) 25 MG tablet Take 1 tablet by mouth every 6 hours as needed for Nausea (and vomiting) 12 tablet 0    Multiple Vitamins-Minerals (CENTRUM SILVER ADULT 50+) TABS Take 1 tablet by mouth daily 90 tablet 1     No current facility-administered medications on file prior to encounter. REVIEW OF SYSTEMS:     Comprehensive ROS completed, pertinent items per HPI    PHYSICAL EXAM:     BP (!) 152/70   Pulse 64   Temp 97.2 °F (36.2 °C) (Infrared)   Resp 18   LMP  (LMP Unknown) Comment: age 52 ovaries intact  SpO2 97%   Wt Readings from Last 3 Encounters:   06/06/22 130 lb (59 kg)   05/25/22 146 lb 9.6 oz (66.5 kg)   05/17/22 151 lb 6.4 oz (68.7 kg)       General:  Awake, alert, no apparent distress. Appears stated age  Chest:  Respirations regular, non-labored. Chest rise and fall equal bilaterally.     Neurological: Awake, alert, forgetful  Psychiatric:  Appropriate mood and affect  Extremities: non-traumatic in Z86.79    Coronary artery disease involving native heart without angina pectoris I25.10    Anxiety disorder F41.9    H/O: CVA (cerebrovascular accident) Z80.78    Lumbosacral spinal stenosis M48.07    Vitamin D deficiency E55.9    Hx of breast cancer with liver mets Z85.3    Breast cancer metastasized to liver (HCC) C50.919, C78.7    Platelet inhibition due to Plavix Z79.02    Fall at home, initial encounter W19. Beti Olson, Y92.009    Metastatic breast cancer ([address] C50.919    Status post craniotomy Z98.890    Impaired functional mobility, balance, gait, and endurance Z74.09    Acute cystitis without hematuria N30.00    History of craniotomy Z98.890    Pulmonary nodules R91.8    Essential hypertension I10    Stenosis of left subclavian artery (HCC) I77.1    Brain metastases (HCC) C79.31    Confusion R41.0    Chronic atrial fibrillation (HCC) I48.20    Breast 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 (HCC) C50.919, C79.31    Intractable headache R51.9    Nonhealing surgical wound T81.89XA    Surgical wound dehiscence T81.31XA       PLAN     Patient examined and evaluated. All available lab work, radiology studies, and progress notes pertaining to [facility] reviewed prior to or during patient visit today.    -nonhealing surgical wound, surgical wound dehiscence- Three small open areas noted along incision line. Adjacent skin very thin, fragile- may be difficult to heal.  Minimal drainage noted today. Will start use of Duoderm thin to wound, change twice weekly. Advised her to keep area clean and dry. Clean with chlorhexidine gluconate weekly. Avoid use of regular shampoos due to scents and moisturizers. Home health nursing to assist with wound care as patient is legally blind, unable to see wounds. Orders sent today.      -No indications of infection to wounds today. Education provided on signs and symptoms of infection. Call clinic or seek emergency care should these occur.     Follow up 2-3 weeks. Call clinic with any needs or concerns prior to scheduled visit. All questions and concerns addressed prior to discharge from today's visit. Please see attached Discharge Instructions    Written patient dismissal instructions given to patient and signed by patient or POA. Treatment:   Orders Placed This Encounter   Procedures    Misc nursing order (specify)     Scheduling Instructions:      Visit Discharge/Physician Orders:            [facility] Care:[facility]            Wound Location: scalp            Dressing orders:             1) Gather wound care supplies and arrange on clean table. 2) Wash your hands with soap and water or use alcohol based hand  for 20 seconds (sing \"Happy Birthday\" twice). 3) Cleanse wounds with normal saline or wound cleanser and gauze. Pat dry with clean gauze. 4) open areas x3 on right side of head- wash the head with chlorhexidine soap and a clean wash cloth, pat dry. Apply DuoDerm extra thin spots to the open areas and change twice a week. Keep all dressings clean & dry. Follow up visit:  4  Weeks 7/14/22 at 1:30pm with Soheila Clakre CNP            Keep next scheduled appointment. Please give 24 hour notice if unable to keep appointment. [phone]            If you experience any of the following, please call the Wound Care Service during business hours: Monday through Friday 8:00 am - 4:30 pm  ([phone]). *Increase in pain       *Temperature over 101       *Increase in drainage from your wound or a foul odor       *Uncontrolled swelling       *Need for compression bandage changes due to slippage, breakthrough drainage            If you need medical attention outside of business hours, please contact your Primary Care Doctor or go to the nearest emergency room.          I spent a total of 25 minutes reviewing previous notes, test results and face to face with Latanya Rod  on 6/20/2022. Greater than 50% of this time was spent on counseling, reviewing and discussing test results, answering questions, instructions on treatment and/or medications ordered, and coordinating the care based on my plan and assessment as noted. Discharge Instructions     Discharge Instructions       Visit Discharge/Physician Orders:    Home Care:[facility]    Wound Location: scalp    Dressing orders:     1) Gather wound care supplies and arrange on clean table. 2) Wash your hands with soap and water or use alcohol based hand  for 20 seconds (sing \"Happy Birthday\" twice). 3) Cleanse wounds with normal saline or wound cleanser and gauze. Pat dry with clean gauze. 4) open areas x3 on right side of head- wash the head with chlorhexidine soap and a clean wash cloth, pat dry. Apply DuoDerm extra thin spots to the open areas and change twice a week. Keep all dressings clean & dry. Follow up visit:  4  Weeks 7/14/22 at 1:30pm with Phill Sandoval CNP    Keep next scheduled appointment. Please give 24 hour notice if unable to keep appointment. [phone]    If you experience any of the following, please call the Wound Care Service during business hours: Monday through Friday 8:00 am - 4:30 pm  ([phone]). *Increase in pain   *Temperature over 101   *Increase in drainage from your wound or a foul odor   *Uncontrolled swelling   *Need for compression bandage changes due to slippage, breakthrough drainage    If you need medical attention outside of business hours, please contact your Primary Care Doctor or go to the nearest emergency room.                    Electronically signed by PAMELA Gaming CNP on 6/20/2022 at 2:45 PM

## 2022-06-20 NOTE — PLAN OF CARE
Problem: Wound:  Goal: Will show signs of wound healing; wound closure and no evidence of infection  Description: Will show signs of wound healing; wound closure and no evidence of infection  Outcome: Progressing   Pt. Seen today for head wound see AVS for new orders. Follow up in 4 weeks. Care plan reviewed with patient. Patient verbalize understanding of the plan of care and contribute to goal setting.

## 2022-06-21 RX ORDER — ERGOCALCIFEROL 1.25 MG/1
CAPSULE ORAL
Qty: 5 CAPSULE | Refills: 5 | Status: ON HOLD | OUTPATIENT
Start: 2022-06-21 | End: 2022-09-08 | Stop reason: HOSPADM

## 2022-06-21 NOTE — TELEPHONE ENCOUNTER
Called [facility] and they stated they would like the RX to go to [facility] on [address]. I verified the dosing of the Biotin and she is taking 1000 mcg 1 tablet by mouth 2 times a day.

## 2022-06-21 NOTE — TELEPHONE ENCOUNTER
Patient calling in again. She rescheduled her appt from today 6/21/2022 to 6/23/2022 due to no ride and not feeling well. She again asked about pain medication. Please advise.

## 2022-06-22 DIAGNOSIS — Z98.890 STATUS POST CRANIOTOMY: Primary | ICD-10-CM

## 2022-06-22 NOTE — TELEPHONE ENCOUNTER
Patient is requesting a refill of Norco, last prescription given by our office was on 5/10/22. Patient had surgery 5/11/22, post op appointment was rescheduled to tomorrow 6/23/22. Please advise.

## 2022-06-22 NOTE — TELEPHONE ENCOUNTER
Yaya Tony is requesting a refill of the following medication(s); Requested Prescriptions     Pending Prescriptions Disp Refills    HYDROcodone-acetaminophen (NORCO) 5-325 MG per tablet 12 tablet 0     Sig: Take 1 tablet by mouth every 6 hours as needed for Pain for up to 3 days. Intended supply: 3 days.  Take lowest dose possible to manage pain       Last filled;  5/10/22

## 2022-06-23 ENCOUNTER — OFFICE VISIT (OUTPATIENT)
Dept: NEUROSURGERY | Age: 87
End: 2022-06-23

## 2022-06-23 VITALS
DIASTOLIC BLOOD PRESSURE: 80 MMHG | HEIGHT: 62 IN | BODY MASS INDEX: 23.92 KG/M2 | WEIGHT: 130 LBS | SYSTOLIC BLOOD PRESSURE: 102 MMHG

## 2022-06-23 DIAGNOSIS — G93.9 BRAIN LESION: ICD-10-CM

## 2022-06-23 DIAGNOSIS — Z98.890 STATUS POST CRANIOTOMY: Primary | ICD-10-CM

## 2022-06-23 PROCEDURE — 99024 POSTOP FOLLOW-UP VISIT: CPT

## 2022-06-23 RX ORDER — HYDROCODONE BITARTRATE AND ACETAMINOPHEN 5; 325 MG/1; MG/1
1 TABLET ORAL EVERY 6 HOURS PRN
Qty: 28 TABLET | Refills: 0 | Status: SHIPPED | OUTPATIENT
Start: 2022-06-23 | End: 2022-06-30

## 2022-06-23 ASSESSMENT — ENCOUNTER SYMPTOMS
NAUSEA: 0
BACK PAIN: 0
SHORTNESS OF BREATH: 0
TROUBLE SWALLOWING: 0
COUGH: 0
CONSTIPATION: 0
COLOR CHANGE: 0

## 2022-06-23 NOTE — PROGRESS NOTES
Neurosurgery Follow up Note    Date:6/23/2022         Patient Name:Latanya Rod     YOB: 1933     Age:89 y.o. Reason for Follow up:  4 week follow up for incision site check      Chief Complaint:   Chief Complaint   Patient presents with    Follow-up     wound care         Subjective   Meliton Tello is a 80year old female who presents to the office  With her niece [facility]. Patient stated that she went home from the nursing home last Tuesday. She sated that she has someone with her 24/7. She stated that she is to see Dr. Gracy Batista on 6-28-22. She stated that she is still having headaches. She stated that her dizziness has improved. Patient stated that she is sleeping better. Patient stated that she is following with wound care. Patient denies fever, fatigue, and chills. Patient stated that she is glad to be back home. Patient denies recent fall or trauma. Review of Systems   Review of Systems   Constitutional: Negative for activity change, appetite change and fatigue. HENT: Negative for trouble swallowing. Eyes: Positive for visual disturbance (blind). Respiratory: Negative for cough and shortness of breath. Cardiovascular: Negative for chest pain. Gastrointestinal: Negative for constipation and nausea. Musculoskeletal: Negative for back pain and gait problem. Skin: Positive for wound (open areas on right side of head close to incision line). Negative for color change and rash. Neurological: Positive for dizziness (has improved), numbness (feet and legs) and headaches. Negative for weakness. Psychiatric/Behavioral: Negative for confusion and sleep disturbance. The patient is not nervous/anxious. Medications     Current Outpatient Medications on File Prior to Visit   Medication Sig Dispense Refill    HYDROcodone-acetaminophen (NORCO) 5-325 MG per tablet Take 1 tablet by mouth every 6 hours as needed for Pain for up to 7 days. Intended supply: 3 days.  Take lowest dose possible to manage pain 28 tablet 0    RA BIOTIN 1000 MCG TABS Take 1,000 mcg by mouth 2 times daily 60 tablet 5    vitamin D (ERGOCALCIFEROL) 1.25 MG (86079 UT) CAPS capsule take 1 capsule by mouth every week 5 capsule 5    metoprolol tartrate (LOPRESSOR) 25 MG tablet Take 0.5 tablets by mouth 2 times daily 60 tablet 3    levothyroxine (SYNTHROID) 100 MCG tablet take 1 tablet by mouth once daily 90 tablet 0    ALPRAZolam (XANAX) 0.25 MG tablet take 1 tablet by mouth twice a day if needed for anxiety 60 tablet 2    isosorbide mononitrate (IMDUR) 30 MG extended release tablet take 1 tablet by mouth once daily 90 tablet 1    simvastatin (ZOCOR) 20 MG tablet take 1 tablet by mouth every evening 90 tablet 1    docusate (COLACE, DULCOLAX) 100 MG CAPS Take 100 mg by mouth 2 times daily 60 capsule 1    Nebulizers (COMPRESSOR/NEBULIZER) MISC 1 Device by Does not apply route 4 times daily as needed (Shortness of breath and wheezing) 1 each 3    albuterol (PROVENTIL) (2.5 MG/3ML) 0.083% nebulizer solution Take 3 mLs by nebulization every 6 hours as needed for Wheezing 120 each 3    polyethylene glycol (GLYCOLAX) 17 GM/SCOOP powder Take 17 g by mouth daily as needed (constipation) 510 g 5    pantoprazole (PROTONIX) 40 MG tablet take 1 tablet by mouth once daily 90 tablet 1    zinc sulfate (ZINCATE) 220 (50 Zn) MG capsule Take 1 capsule by mouth daily 30 capsule 0    albuterol sulfate  (90 Base) MCG/ACT inhaler Inhale 2 puffs into the lungs every 6 hours as needed for Wheezing or Shortness of Breath 18 g 3    predniSONE (DELTASONE) 1 MG tablet Take 1 tablet by mouth daily - resume after completes decadron 60 tablet 2    levETIRAcetam (KEPPRA) 500 MG tablet take 1 tablet by mouth twice a day 240 tablet 2    loratadine (CLARITIN) 10 MG tablet take 1 tablet by mouth once daily 90 tablet 1    REFRESH PLUS 0.5 % SOLN ophthalmic solution use as directed      ondansetron (ZOFRAN-ODT) 4 MG disintegrating tablet Take 4 mg by mouth every 8 hours as needed      traZODone (DESYREL) 50 MG tablet take 1 tablet by mouth at bedtime if needed for sleep 30 tablet 5    lidocaine 4 % external patch Apply 2 patches topically daily 3 box 1    melatonin 3 MG TABS tablet Take 1 tablet by mouth nightly as needed (insomnia) 30 tablet 3    Nutritional Supplements (ENSURE ORIGINAL) LIQD Take 1 Can by mouth 2 times daily 60 Bottle 5    sertraline (ZOLOFT) 50 MG tablet take 1 tablet by mouth twice a day 180 tablet 1    Calcium Carbonate-Vitamin D (OYSTER SHELL CALCIUM/D) 500-200 MG-UNIT TABS take 1 tablet by mouth twice a day 180 tablet 3    promethazine (PHENERGAN) 25 MG tablet Take 1 tablet by mouth every 6 hours as needed for Nausea (and vomiting) 12 tablet 0    Multiple Vitamins-Minerals (CENTRUM SILVER ADULT 50+) TABS Take 1 tablet by mouth daily 90 tablet 1    amiodarone (CORDARONE) 200 MG tablet Take 1 tablet by mouth daily 30 tablet 0     No current facility-administered medications on file prior to visit.         Past History    Past Medical History:   has a past medical history of Anxiety, Blind right eye, Breast cancer ([address] Breast cancer metastasized to liver (5/16) and brain (7/21) ([address] Carotid stenosis, Colostomy in place [facility]), Degenerative arthritis of cervical spine, GERD (gastroesophageal reflux disease), History of craniotomy, Hx antineoplastic chemo, Hypercholesteremia, Hypoestrogenism, Hypothyroidism, Lumbago, Lumbosacral spinal stenosis, MDRO (multiple drug resistant organisms) resistance, Metastasis ([address] Personal history of cardiac dysrhythmia, Respiratory tract infection due to COVID-19 virus, Sigmoid diverticulosis, Spondylolisthesis of lumbosacral region, Steroid-induced hyperglycemia, Subclavian artery stenosis ([address] Superior and Inferior Pubic ramus fracture, right, closed, initial encounter ([address] SVT (supraventricular tachycardia) ([address] Temporal arteritis ([address] Vertebral artery occlusion, and Vitamin D deficiency. Social History:   reports that she has quit smoking. Her smoking use included cigarettes. She smoked 0.50 packs per day. She has never used smokeless tobacco. She reports that she does not drink alcohol and does not use drugs. Family History:   Family History   Problem Relation Age of Onset    Breast Cancer Sister 61        breast    Cancer Brother 79        lung    Cancer Brother 68        colon    Cancer Son 48        lung       Physical Examination      Vitals:  /80 (Site: Right Upper Arm, Position: Sitting, Cuff Size: Large Adult)   Ht 5' 2\" (1.575 m)   Wt 130 lb (59 kg)   LMP  (LMP Unknown) Comment: age 52 ovaries intact  BMI 23.78 kg/m²       Physical Exam  Constitutional:       Appearance: Normal appearance. She is not ill-appearing. HENT:      Nose: Nose normal.      Mouth/Throat:      Mouth: Mucous membranes are moist.   Eyes:      Pupils: Pupils are equal, round, and reactive to light. Cardiovascular:      Rate and Rhythm: Normal rate. Pulses: Normal pulses. Pulmonary:      Effort: Pulmonary effort is normal.   Abdominal:      General: Bowel sounds are normal.   Musculoskeletal:         General: No tenderness. Cervical back: Normal range of motion. Skin:     General: Skin is warm and dry. Findings: No erythema. Comments: open areas on right side of head close to incision line   Neurological:      Mental Status: She is alert and oriented to person, place, and time. Sensory: No sensory deficit. Motor: No weakness. Psychiatric:         Mood and Affect: Mood normal.         Behavior: Behavior normal.         Thought Content: Thought content normal.         Judgment: Judgment normal.       Neurologic Exam     Mental Status   Oriented to person, place, and time. Cranial Nerves     CN III, IV, VI   Pupils are equal, round, and reactive to light.        Imaging   Imaging last 30 days:  MRI BRAIN W WO CONTRAST    Result Date: 6/3/2022   1. Postoperative changes in the right posterior temporal/parietal region. 2. Large cavitary lesion at the surgical site measuring 4.3 x 3.5 x 2.8 cm in size significantly larger than on previous study dated 12/8/2022. Extra-axial fluid collection at the surgical site measuring 4.9 x 0.8 cm in size. 3. Abnormal enhancement along the anterior aspect of the cavity and extensive surrounding edema suggestive of tumor. There is mass effect upon the right lateral ventricle. 4. Mild atrophy and dilatation of the third and lateral ventricles. 5. Increased signal intensity in the white matter which may represent ischemic changes or possibly changes secondary to radiation therapy. 6. Small incidental meningioma over the left frontal lobe laterally. 7. Inflammatory changes in the mastoid air cells bilaterally. . **This report has been created using voice recognition software. It may contain minor errors which are inherent in voice recognition technology. ** Final report electronically signed by DR Javier Jones on 6/3/2022 1:58 PM         Assessment and Plan:          1. 4 week follow up for incisional site check  2. MRI of the brain with and without contrast from 6-3-22 discussed with Dr. Yolie Devine. He stated that post op scans showed complete resection of tumor and the new scan on 6-3-22 showed tumor. Discussed with patient that Dr. Yolie Devine stated that she is not a surgical canidate  3. Continue to follow with   4. Discussed with patient that if she continue to need pain medication then she needs to get them from her PCP. Patient verbalized understanding. 5. Continue to follow with wound care  6. Follow up in 2 month for incisional site check . 7. Call office is symptoms worsen or fail to improve  8. All patient questions answered. Pt voiced understanding.  Patient instructed to call the office with any questions or concerns      Electronically signed by PAMELA Jacobo CNP on 6/23/22 at 11:11 AM FADIT

## 2022-06-27 ENCOUNTER — CARE COORDINATION (OUTPATIENT)
Dept: CARE COORDINATION | Age: 87
End: 2022-06-27

## 2022-06-27 RX ORDER — BIOTIN 1 MG
1 TABLET ORAL 2 TIMES DAILY
Qty: 60 TABLET | Refills: 3 | Status: SHIPPED | OUTPATIENT
Start: 2022-06-27 | End: 2022-07-26

## 2022-06-28 ENCOUNTER — TELEPHONE (OUTPATIENT)
Dept: FAMILY MEDICINE CLINIC | Age: 87
End: 2022-06-28

## 2022-06-28 RX ORDER — B-COMPLEX WITH VITAMIN C
TABLET ORAL
Qty: 180 TABLET | Refills: 3 | Status: SHIPPED | OUTPATIENT
Start: 2022-06-28

## 2022-06-28 NOTE — TELEPHONE ENCOUNTER
Date of last visit:  8/3/2021  Date of next visit:  7/5/2022    Requested Prescriptions     Pending Prescriptions Disp Refills    sertraline (ZOLOFT) 50 MG tablet 180 tablet 1     Sig: take 1 tablet by mouth twice a day    Calcium Carbonate-Vitamin D (OYSTER SHELL CALCIUM/D) 500-200 MG-UNIT TABS 180 tablet 3     Sig: take 1 tablet by mouth twice a day

## 2022-06-28 NOTE — TELEPHONE ENCOUNTER
Pari Serna with [facility] called stating that pt had a fall this morning. Pt has no head injury and not complaining of pain. She does have a bruise on her left elbow. Pt refused to go to ER.

## 2022-06-28 NOTE — TELEPHONE ENCOUNTER
David Vinson with [facility] called for a refill of the following:    sertraline (ZOLOFT) 50 MG tablet BID  OYSTER SHELL CALCIUM + D3 500-400 MG-UNIT TABS BID    Send to [address] [place] [address] on INSKIP

## 2022-06-28 NOTE — DISCHARGE SUMMARY
[address] [place] [address]                               DISCHARGE SUMMARY    PATIENT NAME: Jas Dubon                      :        1933  MED REC NO:   [id number]                           ROOM:       [id number]  ACCOUNT NO:   [de-identified]                           ADMIT DATE: 2022  PROVIDER:     Alma Jorge. Zachary Fortune M.D.            Suzanne Cork: 2022    FINAL DIAGNOSES:  1. Confusion. 2.  Hyponatremia with the sodium of 124.  3.  Infected incision site, status post recent craniotomy. 4.  Metastatic breast cancer to the brain. 5.  Recent debulking of the right metastatic cancer in the brain. 6.  Hypothyroidism. 7.  Chronic atrial fibrillation. 8.  Breast cancer, metastasis to liver and brain. 9.  Essential hypertension. 10.  Chronic current use of systemic steroids. 11.  History of temporal arteritis. 12.  Right eye blindness. 13.  Coronary artery disease without angina. 14.  GERD. DISCHARGE MEDICATIONS TO THE NURSING HOME:  1.  Trazodone 50 mg one tablet at bedtime. 2.  Lidocaine patch, two patches topically daily. 3.  Bactroban ointment three times a day into the scalp sutures wound. 4.  Amiodarone 200 mg one tablet daily. 5.  Metoprolol 25 mg one-half tablet twice a day. 6.  Synthroid 100 mcg one tablet daily. 7.  Xanax 0.25 mg one tablet twice a day. 8.  Imdur 30 mg one tablet once daily. 9.  Zocor 20 mg one tablet daily. 10.  Dulcolax 100 mg twice a day. 11.  Albuterol nebulizer 3 mL through nebulizer every 6 hours daily for  wheezing. 12.  GlycoLax 17 gm daily p.r.n. for constipation. 13.  Protonix 40 mg one tablet daily. 14.  Zinc sulfate 200 mg one capsule daily. 15.  Prednisone 1 mg one tablet daily. 16.  Keppra 500 mg twice a day. 17.  Claritin 10 mg one tablet daily. 18.  Refresh plus ophthalmic solution as needed every 2 to 4 hours.   19.  Zofran ODT 4 mg every eight hours as needed for nausea and  vomiting. 20.  Melatonin 3 mg one tablet daily, nightly. 21.  Zoloft 50 mg one tablet twice a day. 22.  Calcium with Vitamin D one tablet twice a day. 23.  Phenergan 25 mg every six hours as needed for nausea, vomiting. 24.  Centrum Silver one tablet daily. 25.  Dietary supplement/Ensure p.r.n. daily. ACTIVITY:  As tolerated and as per the patient's physical therapy in the  nursing home. CONSULTANTS DURING THIS HOSPITALIZATION:  Dr. Ira Alford from  Neurosurgery; occupational and physical therapist; Dr. Ericka Turner,  Physical Medicine and Rehabilitation, who recommends the patient to be  sent to the nursing home. DIET:  As tolerated. FOLLOWUP:  Will be by the medical director at the nursing home. HISTORY AND PHYSICAL:  The patient is an 80-year-old female brought in  by EMS because of confusion and seepage of the incision and right  parietal head. The patient is known to me through the office, history  of metastatic breast cancer, metastatic to the liver and also to the  colon and to the brain. The patient had debulking of recurrent cancer  of the breast and the brain by Dr. Natasha Junior, was re-operated on 05/11/2022. The patient subsequently developed subgaleal fluid under right parietal  head where the incision is and sutures are. The patient has been  aspirated twice during her last hospitalization. The patient was  subsequently discharged this past Friday,, and I had seen the  patient at that time. The patient has significant swelling on the right  parietal head. The swelling is very tense; however,it is not infected. The patient was discharged on that day and at home, attended by the home  health nurse. The home health nurse noticed more fluid coming out from  the suture line and the patient seemed to be more confused. The patient  was then sent to the emergency room, noted to be hyponatremic with  sodium of 124 for which the patient was hence admitted. When I had seen  the patient, the patient is confused. She is unable to recognize me and  usually she recognizes me easily. She has been complaining of headache. No fever, no chills. PHYSICAL EXAMINATION:  GENERAL:  The patient is confused and unable to recognize. Finally, she  was able to recall. VITAL SIGNS:  Blood pressure 133/74, respirations 18, pulse 74,  temperature 98. 6. HEENT:  Head:  There is a healing suture sutured scalp, incised wound on  the scalp secondary to recent debulking/craniotomy of the metastatic  brain cancer. There is swelling; fluid is more soft as compared to when  I had seen the patient last time, which was very firm. Today it is  softer. There is fluid coming out on the suture lines. 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 The left eye showed minimal drainage. Oral mucosa is  moist.  NECK:  Supple. LUNGS:  Clear to auscultation. Decreased breath sounds. Occasional  crackles in the bases. HEART:  Regular rate and rhythm. ABDOMEN:  Flat, depressible, nontender. EXTREMITIES:  No edema. NEUROLOGICAL:  Cranial nerves II through XII are all intact except for  the right eye blindness. Motor and sensory 100%. HOSPITAL COURSE:  The patient was admitted by me, was placed on SCDs for  DVT prophylaxis. Electrolytes were replaced slowly by saline. The  patient was started on Rocephin 1 gm IV every 12 hours. I consulted  Physical Therapy; and also Neurosurgery, Dr. Bruce Bales, who did not   recommend further procedure or surgery on the patient. The swelling   on the right parietal head gradually improved with compression dressings. The eye drainage is much better with Ciloxan. The patient on the third   hospital day was seen by the hospice/palliative personnel. The patient was also seen by Physical and Rehabilitation for rehab; however, the patient was recommended to go to  the nursing home. The patient stayed in the hospital for five days.    During her course, the swelling improved, the patient's confusion  improved, and feeling much better. The patient was sent to the extended  care facility on 05/28/2022 in improved and in stable condition. CYNTHIA Hancock M.D.    D: 06/27/2022 16:43:08       T: 06/28/2022 4:39:17     [email or web address]  Job#: [id number]     Doc#: [id number]    CC:

## 2022-06-29 NOTE — CARE COORDINATION
Unable to reach or leave VM on home phone. Left detailed message on mobile phone to return phone call for possible admission.

## 2022-07-05 ENCOUNTER — CARE COORDINATION (OUTPATIENT)
Dept: CARE COORDINATION | Age: 87
End: 2022-07-05

## 2022-07-05 ENCOUNTER — OFFICE VISIT (OUTPATIENT)
Dept: FAMILY MEDICINE CLINIC | Age: 87
End: 2022-07-05

## 2022-07-05 VITALS
WEIGHT: 136.6 LBS | SYSTOLIC BLOOD PRESSURE: 122 MMHG | RESPIRATION RATE: 12 BRPM | HEART RATE: 68 BPM | HEIGHT: 62 IN | BODY MASS INDEX: 25.14 KG/M2 | DIASTOLIC BLOOD PRESSURE: 68 MMHG

## 2022-07-05 DIAGNOSIS — R51.9 INTRACTABLE EPISODIC HEADACHE, UNSPECIFIED HEADACHE TYPE: ICD-10-CM

## 2022-07-05 DIAGNOSIS — C79.31 MALIGNANT NEOPLASM OF BREAST METASTATIC TO BRAIN, UNSPECIFIED LATERALITY (HCC): Primary | ICD-10-CM

## 2022-07-05 DIAGNOSIS — Z98.890 STATUS POST CRANIOTOMY: ICD-10-CM

## 2022-07-05 DIAGNOSIS — H54.40 BLINDNESS OF RIGHT EYE WITH LOW VISION IN CONTRALATERAL EYE: ICD-10-CM

## 2022-07-05 DIAGNOSIS — M31.6 TEMPORAL ARTERITIS (HCC): ICD-10-CM

## 2022-07-05 DIAGNOSIS — H54.50 BLINDNESS OF RIGHT EYE WITH LOW VISION IN CONTRALATERAL EYE: ICD-10-CM

## 2022-07-05 DIAGNOSIS — C50.919 MALIGNANT NEOPLASM OF BREAST METASTATIC TO BRAIN, UNSPECIFIED LATERALITY (HCC): Primary | ICD-10-CM

## 2022-07-05 PROCEDURE — 1036F TOBACCO NON-USER: CPT | Performed by: EMERGENCY MEDICINE

## 2022-07-05 PROCEDURE — 1090F PRES/ABSN URINE INCON ASSESS: CPT | Performed by: EMERGENCY MEDICINE

## 2022-07-05 PROCEDURE — 99214 OFFICE O/P EST MOD 30 MIN: CPT | Performed by: EMERGENCY MEDICINE

## 2022-07-05 PROCEDURE — 1123F ACP DISCUSS/DSCN MKR DOCD: CPT | Performed by: EMERGENCY MEDICINE

## 2022-07-05 PROCEDURE — G8427 DOCREV CUR MEDS BY ELIG CLIN: HCPCS | Performed by: EMERGENCY MEDICINE

## 2022-07-05 PROCEDURE — G8420 CALC BMI NORM PARAMETERS: HCPCS | Performed by: EMERGENCY MEDICINE

## 2022-07-05 RX ORDER — HYDROCODONE BITARTRATE AND ACETAMINOPHEN 5; 325 MG/1; MG/1
1 TABLET ORAL
Qty: 24 TABLET | Refills: 0 | Status: SHIPPED | OUTPATIENT
Start: 2022-07-05 | End: 2022-07-24 | Stop reason: SDUPTHER

## 2022-07-05 SDOH — ECONOMIC STABILITY: FOOD INSECURITY: WITHIN THE PAST 12 MONTHS, THE FOOD YOU BOUGHT JUST DIDN'T LAST AND YOU DIDN'T HAVE MONEY TO GET MORE.: NEVER TRUE

## 2022-07-05 SDOH — ECONOMIC STABILITY: FOOD INSECURITY: WITHIN THE PAST 12 MONTHS, YOU WORRIED THAT YOUR FOOD WOULD RUN OUT BEFORE YOU GOT MONEY TO BUY MORE.: NEVER TRUE

## 2022-07-05 ASSESSMENT — PATIENT HEALTH QUESTIONNAIRE - PHQ9
2. FEELING DOWN, DEPRESSED OR HOPELESS: 0
SUM OF ALL RESPONSES TO PHQ9 QUESTIONS 1 & 2: 0
1. LITTLE INTEREST OR PLEASURE IN DOING THINGS: 0
SUM OF ALL RESPONSES TO PHQ QUESTIONS 1-9: 0

## 2022-07-05 ASSESSMENT — ENCOUNTER SYMPTOMS
SINUS PRESSURE: 0
DIARRHEA: 0
COUGH: 0
CONSTIPATION: 0
BACK PAIN: 0
NAUSEA: 0
TROUBLE SWALLOWING: 0
VOICE CHANGE: 0
RHINORRHEA: 0
WHEEZING: 0
CHEST TIGHTNESS: 0
VOMITING: 0
SORE THROAT: 0
SHORTNESS OF BREATH: 0
ABDOMINAL PAIN: 0

## 2022-07-05 ASSESSMENT — SOCIAL DETERMINANTS OF HEALTH (SDOH): HOW HARD IS IT FOR YOU TO PAY FOR THE VERY BASICS LIKE FOOD, HOUSING, MEDICAL CARE, AND HEATING?: NOT VERY HARD

## 2022-07-05 NOTE — CARE COORDINATION
Left message to return phone call to complete enrollment outreach from [facility] referral.  Have not been able to reach and no return call X 3 attempts. Will send out [email or web address] message. unsuccessful enrollment at this time.

## 2022-07-05 NOTE — PROGRESS NOTES
Visit Date: 7/5/2022    Subjective:    Carl Benito is a 80 y. o.female who presents today for:  Chief Complaint   Patient presents with    Other     NH Follow Up         HPI:     HPI     Here for a follow-up, She was discharge from the [facility] 2 weeks ago. The patient is living at home, being taken care of by the brother and sister-in-law who moved from [place] to [facility]. Patient been doing well    Toe nails problem    Seeing Dr Sarath Murphy now as Dr Nora Cantu retired    Patient has been complaining of headache and wants to go back to taking Norco that he takes 1/2 to 1 tablet once in a while    CurrentHome Medications:  Current Outpatient Medications   Medication Sig Dispense Refill    HYDROcodone-acetaminophen (NORCO) 5-325 MG per tablet Take 1 tablet by mouth once as needed for Pain for up to 1 dose.  24 tablet 0    sertraline (ZOLOFT) 50 MG tablet take 1 tablet by mouth twice a day 180 tablet 1    Calcium Carbonate-Vitamin D (OYSTER SHELL CALCIUM/D) 500-200 MG-UNIT TABS take 1 tablet by mouth twice a day 180 tablet 3    Biotin 1000 MCG TABS Take 1 tablet by mouth in the morning and at bedtime 60 tablet 3    RA BIOTIN 1000 MCG TABS Take 1,000 mcg by mouth 2 times daily 60 tablet 5    vitamin D (ERGOCALCIFEROL) 1.25 MG (79115 UT) CAPS capsule take 1 capsule by mouth every week 5 capsule 5    amiodarone (CORDARONE) 200 MG tablet Take 1 tablet by mouth daily 30 tablet 0    metoprolol tartrate (LOPRESSOR) 25 MG tablet Take 0.5 tablets by mouth 2 times daily 60 tablet 3    levothyroxine (SYNTHROID) 100 MCG tablet take 1 tablet by mouth once daily 90 tablet 0    ALPRAZolam (XANAX) 0.25 MG tablet take 1 tablet by mouth twice a day if needed for anxiety 60 tablet 2    isosorbide mononitrate (IMDUR) 30 MG extended release tablet take 1 tablet by mouth once daily 90 tablet 1    simvastatin (ZOCOR) 20 MG tablet take 1 tablet by mouth every evening 90 tablet 1    docusate (COLACE, DULCOLAX) 100 MG CAPS Take 100 mg by mouth 2 times daily 60 capsule 1    Nebulizers (COMPRESSOR/NEBULIZER) MISC 1 Device by Does not apply route 4 times daily as needed (Shortness of breath and wheezing) 1 each 3    albuterol (PROVENTIL) (2.5 MG/3ML) 0.083% nebulizer solution Take 3 mLs by nebulization every 6 hours as needed for Wheezing 120 each 3    polyethylene glycol (GLYCOLAX) 17 GM/SCOOP powder Take 17 g by mouth daily as needed (constipation) 510 g 5    pantoprazole (PROTONIX) 40 MG tablet take 1 tablet by mouth once daily 90 tablet 1    zinc sulfate (ZINCATE) 220 (50 Zn) MG capsule Take 1 capsule by mouth daily 30 capsule 0    albuterol sulfate  (90 Base) MCG/ACT inhaler Inhale 2 puffs into the lungs every 6 hours as needed for Wheezing or Shortness of Breath 18 g 3    predniSONE (DELTASONE) 1 MG tablet Take 1 tablet by mouth daily - resume after completes decadron 60 tablet 2    levETIRAcetam (KEPPRA) 500 MG tablet take 1 tablet by mouth twice a day 240 tablet 2    loratadine (CLARITIN) 10 MG tablet take 1 tablet by mouth once daily 90 tablet 1    REFRESH PLUS 0.5 % SOLN ophthalmic solution use as directed      ondansetron (ZOFRAN-ODT) 4 MG disintegrating tablet Take 4 mg by mouth every 8 hours as needed      traZODone (DESYREL) 50 MG tablet take 1 tablet by mouth at bedtime if needed for sleep 30 tablet 5    melatonin 3 MG TABS tablet Take 1 tablet by mouth nightly as needed (insomnia) 30 tablet 3    Nutritional Supplements (ENSURE ORIGINAL) LIQD Take 1 Can by mouth 2 times daily 60 Bottle 5    promethazine (PHENERGAN) 25 MG tablet Take 1 tablet by mouth every 6 hours as needed for Nausea (and vomiting) 12 tablet 0    Multiple Vitamins-Minerals (CENTRUM SILVER ADULT 50+) TABS Take 1 tablet by mouth daily 90 tablet 1     No current facility-administered medications for this visit. Subjective:      Review of Systems   Constitutional: Negative for appetite change, chills, diaphoresis, fatigue and fever.    HENT: Negative for congestion, ear pain, postnasal drip, rhinorrhea, sinus pressure, sneezing, sore throat, trouble swallowing and voice change. Respiratory: Negative for cough, chest tightness, shortness of breath and wheezing. Cardiovascular: Negative for chest pain, palpitations and leg swelling. Gastrointestinal: Negative for abdominal pain, constipation, diarrhea, nausea and vomiting. Musculoskeletal: Negative for arthralgias, back pain, joint swelling, myalgias, neck pain and neck stiffness. Neurological: Positive for headaches. Negative for dizziness, syncope, weakness, light-headedness and numbness. Objective:     /68 (Site: Right Upper Arm, Position: Sitting, Cuff Size: Medium Adult)   Pulse 68   Resp 12   Ht 5' 2\" (1.575 m)   Wt 136 lb 9.6 oz (62 kg)   LMP  (LMP Unknown) Comment: age 52 ovaries intact  BMI 24.98 kg/m²   BP Readings from Last 3 Encounters:   07/05/22 122/68   06/23/22 102/80   06/20/22 (!) 152/70     Wt Readings from Last 3 Encounters:   07/05/22 136 lb 9.6 oz (62 kg)   06/23/22 130 lb (59 kg)   06/06/22 130 lb (59 kg)       Physical Exam  Vitals reviewed. Constitutional:       Appearance: She is well-developed. HENT:      Head: Normocephalic and atraumatic. Right Ear: External ear normal.      Left Ear: External ear normal.      Nose: Nose normal.   Eyes:      General: No scleral icterus. Conjunctiva/sclera: Conjunctivae normal.      Pupils: Pupils are equal, round, and reactive to light. Neck:      Thyroid: No thyromegaly. Vascular: No JVD. Cardiovascular:      Rate and Rhythm: Normal rate and regular rhythm. Heart sounds: No murmur heard. No friction rub. Pulmonary:      Effort: Pulmonary effort is normal.      Breath sounds: Normal breath sounds. No wheezing or rales. Chest:      Chest wall: No tenderness. Abdominal:      General: Bowel sounds are normal.      Palpations: Abdomen is soft. There is no mass. Tenderness:  There is no abdominal tenderness. Musculoskeletal:      Cervical back: Normal range of motion and neck supple. Lymphadenopathy:      Cervical: No cervical adenopathy. Skin:     Findings: No rash. Neurological:      Mental Status: She is alert and oriented to person, place, and time. Psychiatric:         Behavior: Behavior is cooperative. Assessment:         Diagnosis Orders   1. 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 Intractable episodic headache, unspecified headache type     3. History Temporal arteritis (HCC)     4. Status post craniotomy  HYDROcodone-acetaminophen (NORCO) 5-325 MG per tablet   5. 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 Dispense:  24 tablet     Refill:  0     Reduce doses taken as pain becomes manageable     Orders Placed:  No orders of the defined types were placed in this encounter. Discussed with the patient and Bernadette, regarding code situations living well, patient do not want to get any life support, patient is agreeable with DNR CCA arrest     Return in about 3 months (around 10/5/2022) for HA HTN. Discussed use, benefit, and side effects of prescribedmedications. All patient questions answered. Pt voiced understanding. Instructedto continue current medications, diet and exercise. Patient agreed with treatmentplan.

## 2022-07-06 ENCOUNTER — HOSPITAL ENCOUNTER (OUTPATIENT)
Dept: RADIATION ONCOLOGY | Age: 87
Discharge: HOME | DRG: 054 | End: 2022-07-06
Payer: MEDICARE

## 2022-07-06 ENCOUNTER — HOSPITAL ENCOUNTER (EMERGENCY)
Age: 87
Discharge: HOME | End: 2022-07-06
Attending: STUDENT IN AN ORGANIZED HEALTH CARE EDUCATION/TRAINING PROGRAM
Payer: MEDICARE

## 2022-07-06 ENCOUNTER — APPOINTMENT (OUTPATIENT)
Dept: GENERAL RADIOLOGY | Age: 87
End: 2022-07-06
Payer: MEDICARE

## 2022-07-06 VITALS
TEMPERATURE: 97.4 F | WEIGHT: 138 LBS | DIASTOLIC BLOOD PRESSURE: 43 MMHG | RESPIRATION RATE: 16 BRPM | OXYGEN SATURATION: 95 % | HEART RATE: 63 BPM | BODY MASS INDEX: 25.24 KG/M2 | SYSTOLIC BLOOD PRESSURE: 82 MMHG

## 2022-07-06 VITALS
HEART RATE: 53 BPM | SYSTOLIC BLOOD PRESSURE: 116 MMHG | DIASTOLIC BLOOD PRESSURE: 59 MMHG | BODY MASS INDEX: 25.24 KG/M2 | RESPIRATION RATE: 14 BRPM | OXYGEN SATURATION: 98 % | WEIGHT: 138 LBS | TEMPERATURE: 98.2 F

## 2022-07-06 DIAGNOSIS — I95.9 HYPOTENSION, UNSPECIFIED HYPOTENSION TYPE: ICD-10-CM

## 2022-07-06 DIAGNOSIS — R55 NEAR SYNCOPE: Primary | ICD-10-CM

## 2022-07-06 LAB
ANION GAP SERPL CALCULATED.3IONS-SCNC: 15 MEQ/L (ref 8–16)
BASOPHILS # BLD: 0.2 %
BASOPHILS ABSOLUTE: 0 THOU/MM3 (ref 0–0.1)
BUN BLDV-MCNC: 17 MG/DL (ref 7–22)
CALCIUM SERPL-MCNC: 9.2 MG/DL (ref 8.5–10.5)
CHLORIDE BLD-SCNC: 104 MEQ/L (ref 98–111)
CO2: 21 MEQ/L (ref 23–33)
CREAT SERPL-MCNC: 0.6 MG/DL (ref 0.4–1.2)
EKG ATRIAL RATE: 52 BPM
EKG P AXIS: 99 DEGREES
EKG P-R INTERVAL: 174 MS
EKG Q-T INTERVAL: 504 MS
EKG QRS DURATION: 82 MS
EKG QTC CALCULATION (BAZETT): 468 MS
EKG R AXIS: 49 DEGREES
EKG T AXIS: -143 DEGREES
EKG VENTRICULAR RATE: 52 BPM
EOSINOPHIL # BLD: 1.2 %
EOSINOPHILS ABSOLUTE: 0.1 THOU/MM3 (ref 0–0.4)
ERYTHROCYTE [DISTWIDTH] IN BLOOD BY AUTOMATED COUNT: 15.3 % (ref 11.5–14.5)
ERYTHROCYTE [DISTWIDTH] IN BLOOD BY AUTOMATED COUNT: 51.3 FL (ref 35–45)
GFR SERPL CREATININE-BSD FRML MDRD: > 90 ML/MIN/1.73M2
GLUCOSE BLD-MCNC: 114 MG/DL (ref 70–108)
HCT VFR BLD CALC: 36.5 % (ref 37–47)
HEMOGLOBIN: 11.2 GM/DL (ref 12–16)
IMMATURE GRANS (ABS): 0.04 THOU/MM3 (ref 0–0.07)
IMMATURE GRANULOCYTES: 0.4 %
LYMPHOCYTES # BLD: 32.6 %
LYMPHOCYTES ABSOLUTE: 3.5 THOU/MM3 (ref 1–4.8)
MCH RBC QN AUTO: 28.4 PG (ref 26–33)
MCHC RBC AUTO-ENTMCNC: 30.7 GM/DL (ref 32.2–35.5)
MCV RBC AUTO: 92.4 FL (ref 81–99)
MONOCYTES # BLD: 5.3 %
MONOCYTES ABSOLUTE: 0.6 THOU/MM3 (ref 0.4–1.3)
NUCLEATED RED BLOOD CELLS: 0 /100 WBC
OSMOLALITY CALCULATION: 281.8 MOSMOL/KG (ref 275–300)
PLATELET # BLD: 208 THOU/MM3 (ref 130–400)
PMV BLD AUTO: 11.9 FL (ref 9.4–12.4)
POTASSIUM REFLEX MAGNESIUM: 4.7 MEQ/L (ref 3.5–5.2)
PRO-BNP: 1752 PG/ML (ref 0–1800)
RBC # BLD: 3.95 MILL/MM3 (ref 4.2–5.4)
SEG NEUTROPHILS: 60.3 %
SEGMENTED NEUTROPHILS ABSOLUTE COUNT: 6.4 THOU/MM3 (ref 1.8–7.7)
SODIUM BLD-SCNC: 140 MEQ/L (ref 135–145)
TROPONIN T: < 0.01 NG/ML
WBC # BLD: 10.6 THOU/MM3 (ref 4.8–10.8)

## 2022-07-06 PROCEDURE — 85025 COMPLETE CBC W/AUTO DIFF WBC: CPT

## 2022-07-06 PROCEDURE — 93005 ELECTROCARDIOGRAM TRACING: CPT | Performed by: STUDENT IN AN ORGANIZED HEALTH CARE EDUCATION/TRAINING PROGRAM

## 2022-07-06 PROCEDURE — 83880 ASSAY OF NATRIURETIC PEPTIDE: CPT

## 2022-07-06 PROCEDURE — 80048 BASIC METABOLIC PNL TOTAL CA: CPT

## 2022-07-06 PROCEDURE — 71045 X-RAY EXAM CHEST 1 VIEW: CPT

## 2022-07-06 PROCEDURE — 93010 ELECTROCARDIOGRAM REPORT: CPT | Performed by: INTERNAL MEDICINE

## 2022-07-06 PROCEDURE — 84484 ASSAY OF TROPONIN QUANT: CPT

## 2022-07-06 PROCEDURE — 99285 EMERGENCY DEPT VISIT HI MDM: CPT

## 2022-07-06 PROCEDURE — 6370000000 HC RX 637 (ALT 250 FOR IP): Performed by: STUDENT IN AN ORGANIZED HEALTH CARE EDUCATION/TRAINING PROGRAM

## 2022-07-06 RX ORDER — MIDODRINE HYDROCHLORIDE 2.5 MG/1
2.5 TABLET ORAL 3 TIMES DAILY
Qty: 90 TABLET | Refills: 3 | Status: SHIPPED | OUTPATIENT
Start: 2022-07-06 | End: 2022-07-06 | Stop reason: SDUPTHER

## 2022-07-06 RX ORDER — MIDODRINE HYDROCHLORIDE 2.5 MG/1
2.5 TABLET ORAL ONCE
Status: COMPLETED | OUTPATIENT
Start: 2022-07-06 | End: 2022-07-06

## 2022-07-06 RX ORDER — MIDODRINE HYDROCHLORIDE 2.5 MG/1
2.5 TABLET ORAL 3 TIMES DAILY
Qty: 90 TABLET | Refills: 3 | Status: SHIPPED | OUTPATIENT
Start: 2022-07-06

## 2022-07-06 RX ADMIN — MIDODRINE HYDROCHLORIDE 2.5 MG: 2.5 TABLET ORAL at 19:24

## 2022-07-06 ASSESSMENT — PAIN - FUNCTIONAL ASSESSMENT
PAIN_FUNCTIONAL_ASSESSMENT: NONE - DENIES PAIN

## 2022-07-06 ASSESSMENT — ENCOUNTER SYMPTOMS
BLOOD IN STOOL: 0
VOMITING: 0
SORE THROAT: 0
ABDOMINAL PAIN: 0
BACK PAIN: 0
DIARRHEA: 0
NAUSEA: 0
SHORTNESS OF BREATH: 0
TROUBLE SWALLOWING: 0
COUGH: 0

## 2022-07-06 NOTE — ED NOTES
Fluid bolus initiated and patient placed in trendelenburg in an attempt to increase BP.      Rica Torres, RN  07/06/22 1500

## 2022-07-06 NOTE — ED NOTES
ED resident provider in to evaluate patient. Patient alert and oriented, able to talk about situation that brought her here today.  BP increased to 119/53     Sue Faustin RN  07/06/22 1520

## 2022-07-06 NOTE — ED PROVIDER NOTES
[address] [facility]          Pt Name: Damion Calvin  MRN: [id number]  [place] 5/29/1933  Date of evaluation: 7/6/2022  Treating Resident Physician: Vladimir Jose MD  Supervising Physician: Christine Fortune MD    CHIEF COMPLAINT       Chief Complaint   Patient presents with    Loss of Consciousness     History obtained from the patient. HISTORY OF PRESENT ILLNESS    HPI  Damion Calvin is a 80 y.o. female with PMHx of CAD, subclavian artery stenosis, temporal arteritis, vertebral artery occlusion. An active metastatic breast cancer to the brain status post 3 craniotomies (most recent in May) who presents to the emergency department for evaluation of hypotension and near syncope. Patient was sent from radiation oncology clinic as she was sitting in a chair and was found to be hypotensive with a blood pressure of 82/43. Patient almost syncopized but did not. Patient states that she did not hit her head and denies any preceding chest pain, palpitations, weakness, numbness, paresthesias or headache. The patient has no other acute complaints at this time. REVIEW OF SYSTEMS   Review of Systems   Constitutional: Positive for diaphoresis. Negative for chills, fatigue and fever. HENT: Negative for sore throat and trouble swallowing. Eyes: Negative for visual disturbance. Respiratory: Negative for cough and shortness of breath. Cardiovascular: Negative for chest pain, palpitations and leg swelling. Gastrointestinal: Negative for abdominal pain, blood in stool, diarrhea, nausea and vomiting. Genitourinary: Negative for dysuria, hematuria and urgency. Musculoskeletal: Negative for arthralgias, back pain, myalgias and neck pain. Skin: Negative for rash. Neurological: Positive for light-headedness. Negative for seizures, syncope, weakness, numbness and headaches.          PAST MEDICAL AND SURGICAL HISTORY     Past Medical History: Right 11/06/2017    Dr Darin Berry in [place] ([address])      partial    OTHER SURGICAL HISTORY  05/27/2016    robotic assisted laparoscopic anterior colon resection and end colostomy    ID OFFICE/OUTPT VISIT,PROCEDURE ONLY N/A 9/4/2018    COLOSTOMY REVERSAL AND PARASTOMAL HERNIA REPAIR performed by Ela Stewart MD at [address] [facility]  2002    UPPER GASTROINTESTINAL ENDOSCOPY  11/06     BREAST NEEDLE BIOPSY LEFT Left 05/23/2016    [facility]         MEDICATIONS     Current Facility-Administered Medications:     midodrine (PROAMATINE) tablet 2.5 mg, 2.5 mg, Oral, Once, Stefani Reynolds MD    Current Outpatient Medications:     midodrine (PROAMATINE) 2.5 MG tablet, Take 1 tablet by mouth 3 times daily, Disp: 90 tablet, Rfl: 3    sertraline (ZOLOFT) 50 MG tablet, take 1 tablet by mouth twice a day, Disp: 180 tablet, Rfl: 1    Calcium Carbonate-Vitamin D (OYSTER SHELL CALCIUM/D) 500-200 MG-UNIT TABS, take 1 tablet by mouth twice a day, Disp: 180 tablet, Rfl: 3    Biotin 1000 MCG TABS, Take 1 tablet by mouth in the morning and at bedtime, Disp: 60 tablet, Rfl: 3    RA BIOTIN 1000 MCG TABS, Take 1,000 mcg by mouth 2 times daily, Disp: 60 tablet, Rfl: 5    vitamin D (ERGOCALCIFEROL) 1.25 MG (71951 UT) CAPS capsule, take 1 capsule by mouth every week, Disp: 5 capsule, Rfl: 5    amiodarone (CORDARONE) 200 MG tablet, Take 1 tablet by mouth daily, Disp: 30 tablet, Rfl: 0    metoprolol tartrate (LOPRESSOR) 25 MG tablet, Take 0.5 tablets by mouth 2 times daily, Disp: 60 tablet, Rfl: 3    levothyroxine (SYNTHROID) 100 MCG tablet, take 1 tablet by mouth once daily, Disp: 90 tablet, Rfl: 0    ALPRAZolam (XANAX) 0.25 MG tablet, take 1 tablet by mouth twice a day if needed for anxiety, Disp: 60 tablet, Rfl: 2    isosorbide mononitrate (IMDUR) 30 MG extended release tablet, take 1 tablet by mouth once daily, Disp: 90 tablet, Rfl: 1    simvastatin (ZOCOR) 20 MG tablet, take 1 tablet by mouth every evening, Disp: 90 tablet, Rfl: 1    docusate (COLACE, DULCOLAX) 100 MG CAPS, Take 100 mg by mouth 2 times daily, Disp: 60 capsule, Rfl: 1    Nebulizers (COMPRESSOR/NEBULIZER) MISC, 1 Device by Does not apply route 4 times daily as needed (Shortness of breath and wheezing), Disp: 1 each, Rfl: 3    albuterol (PROVENTIL) (2.5 MG/3ML) 0.083% nebulizer solution, Take 3 mLs by nebulization every 6 hours as needed for Wheezing, Disp: 120 each, Rfl: 3    polyethylene glycol (GLYCOLAX) 17 GM/SCOOP powder, Take 17 g by mouth daily as needed (constipation), Disp: 510 g, Rfl: 5    pantoprazole (PROTONIX) 40 MG tablet, take 1 tablet by mouth once daily, Disp: 90 tablet, Rfl: 1    zinc sulfate (ZINCATE) 220 (50 Zn) MG capsule, Take 1 capsule by mouth daily, Disp: 30 capsule, Rfl: 0    albuterol sulfate  (90 Base) MCG/ACT inhaler, Inhale 2 puffs into the lungs every 6 hours as needed for Wheezing or Shortness of Breath, Disp: 18 g, Rfl: 3    predniSONE (DELTASONE) 1 MG tablet, Take 1 tablet by mouth daily - resume after completes decadron, Disp: 60 tablet, Rfl: 2    levETIRAcetam (KEPPRA) 500 MG tablet, take 1 tablet by mouth twice a day, Disp: 240 tablet, Rfl: 2    loratadine (CLARITIN) 10 MG tablet, take 1 tablet by mouth once daily, Disp: 90 tablet, Rfl: 1    REFRESH PLUS 0.5 % SOLN ophthalmic solution, use as directed, Disp: , Rfl:     ondansetron (ZOFRAN-ODT) 4 MG disintegrating tablet, Take 4 mg by mouth every 8 hours as needed, Disp: , Rfl:     traZODone (DESYREL) 50 MG tablet, take 1 tablet by mouth at bedtime if needed for sleep, Disp: 30 tablet, Rfl: 5    melatonin 3 MG TABS tablet, Take 1 tablet by mouth nightly as needed (insomnia), Disp: 30 tablet, Rfl: 3    Nutritional Supplements (ENSURE ORIGINAL) LIQD, Take 1 Can by mouth 2 times daily, Disp: 60 Bottle, Rfl: 5    promethazine (PHENERGAN) 25 MG tablet, Take 1 tablet by mouth every 6 hours as needed for Nausea (and vomiting), Disp: 12 tablet, Rfl: 0    Multiple Vitamins-Minerals (CENTRUM SILVER ADULT 50+) TABS, Take 1 tablet by mouth daily, Disp: 90 tablet, Rfl: 1      SOCIAL HISTORY     Social History     Social History Narrative    Not on file     Social History     Tobacco Use    Smoking status: Former Smoker     Packs/day: 0.50     Types: Cigarettes    Smokeless tobacco: Never Used   Vaping Use    Vaping Use: Never used   Substance Use Topics    Alcohol use: No    Drug use: No         ALLERGIES     Allergies   Allergen Reactions    Bactrim [Sulfamethoxazole-Trimethoprim] Swelling     Of tongue    Demerol Rash     nausea    Pcn [Penicillins] Rash         FAMILY HISTORY     Family History   Problem Relation Age of Onset    Breast Cancer Sister 61        breast    Cancer Brother 79        lung    Cancer Brother 68        colon    Cancer Son 48        lung         PREVIOUS RECORDS   Previous records reviewed: I reviewed the patient's past medical records including relevant labs, imaging and procedures. I reviewed the note from the radiation oncology clinic. Patient was evaluated by nurse Katie Youssef. Vitals were being taken and blood pressure was 82/43. The nurse noticed decreased alertness and ability to follow commands on the room. The patient then began leaning towards her right side became pale and clammy. Dr. Miguel A Meza was notified and patient was taken to ED        PHYSICAL EXAM     ED Triage Vitals   BP Temp Temp Source Heart Rate Resp SpO2 Height Weight   07/06/22 1452 07/06/22 1457 07/06/22 1457 07/06/22 1452 07/06/22 1452 07/06/22 1452 -- 07/06/22 1452   (S) (!) 83/45 98.2 °F (36.8 °C) Oral 53 16 91 %  138 lb (62.6 kg)     Initial vital signs and nursing assessment reviewed and abnormal from Hypotension. Body mass index is 25.24 kg/m². Pulsoximetry is normal per my interpretation.     Additional Vital Signs:  Vitals:    07/06/22 1723   BP: (!) 103/46   Pulse:    Resp:    Temp:    SpO2:        Physical Exam  Vitals and nursing note reviewed. Constitutional:       Appearance: Normal appearance. She is ill-appearing. HENT:      Head: Normocephalic and atraumatic. Right Ear: Tympanic membrane normal.      Left Ear: Tympanic membrane normal.      Nose: Nose normal.      Mouth/Throat:      Mouth: Mucous membranes are moist.      Pharynx: Oropharynx is clear. No oropharyngeal exudate. Eyes:      General: No scleral icterus. Extraocular Movements: Extraocular movements intact. Conjunctiva/sclera: Conjunctivae normal.      Pupils: Pupils are equal, round, and reactive to light. Cardiovascular:      Rate and Rhythm: Normal rate and regular rhythm. Pulses: Normal pulses. Heart sounds: Normal heart sounds. No murmur heard. No friction rub. No gallop. Pulmonary:      Effort: Pulmonary effort is normal. No respiratory distress. Breath sounds: Normal breath sounds. Abdominal:      Palpations: Abdomen is soft. Tenderness: There is no abdominal tenderness. There is no right CVA tenderness, left CVA tenderness, guarding or rebound. Musculoskeletal:         General: No swelling or tenderness. Normal range of motion. Cervical back: Normal range of motion and neck supple. Right lower leg: No edema. Left lower leg: No edema. Skin:     General: Skin is warm and dry. Capillary Refill: Capillary refill takes less than 2 seconds. Neurological:      General: No focal deficit present. Mental Status: She is alert and oriented to person, place, and time. Cranial Nerves: No cranial nerve deficit. Motor: No weakness.              MEDICAL DECISION MAKING   Initial Assessment:   A 55-year-old female presenting with      Differential diagnosis includes but is not limited to:  Cardiogenic syncope, ACS, orthostatic hypotension, medication side effect, vasovagal syncope    Although some of these diagnoses are unlikely they were considered in my medical decision making. Plan:    Syncope work-up        ED RESULTS   Laboratory results:  Labs Reviewed   BASIC METABOLIC PANEL W/ REFLEX TO MG FOR LOW K - Abnormal; Notable for the following components:       Result Value    CO2 21 (*)     Glucose 114 (*)     All other components within normal limits   CBC WITH AUTO DIFFERENTIAL - Abnormal; Notable for the following components:    RBC 3.95 (*)     Hemoglobin 11.2 (*)     Hematocrit 36.5 (*)     MCHC 30.7 (*)     RDW-CV 15.3 (*)     RDW-SD 51.3 (*)     All other components within normal limits   BRAIN NATRIURETIC PEPTIDE   TROPONIN   ANION GAP   OSMOLALITY   GLOMERULAR FILTRATION RATE, ESTIMATED       Radiologic studies results:  XR CHEST PORTABLE   Final Result   1. The previously noted right internal jugular catheter is no longer seen. 2. Mild cardiomegaly. 3. Diffuse COPD. .               **This report has been created using voice recognition software. It may contain minor errors which are inherent in voice recognition technology. **      Final report electronically signed by DR Sheela Moore on 7/6/2022 4:00 PM          ED Medications administered this visit:   Medications   midodrine (PROAMATINE) tablet 2.5 mg (has no administration in time range)         ED COURSE     ED Course as of 07/06/22 1757   Wed Jul 06, 2022   1653 XR CHEST PORTABLE  IMPRESSION:  1. The previously noted right internal jugular catheter is no longer seen. 2. Mild cardiomegaly. 3. Diffuse COPD. . [TM]      ED Course User Index  [TM] June Salazar MD         MDM:   The patient presents with near syncope. Patient did not have any chest pain and EKG did not reveal signs of ischemia and troponin was not elevated. Fortunately, the patient's PCP was working a shift in our emergency department and was also able to evaluate the patient. PCP knows the patient well and her prior visits she has been hypotensive.   The patient has been mentating appropriately and states that she feels better after the fluid bolus.  Prior to the craniotomies, the patient was taken off her midodrine which I suspect is likely the cause of her hypotension. The patient's PCP and I discussed this and we decided to place the patient back on midodrine and give her dose to the emergency department. I discussed the risk of hypotension with the patient especially risk of syncope and fall. The patient understands these risks and through shared decision-making, the patient wishes to go home and restart the midodrine. PCP also agrees with this plan. The patient has a home nurse that can monitor the patient and her response to restarting the midodrine. Strict return precautions and follow up instructions were discussed with the patient prior to discharge, with which the patient agrees. MEDICATION CHANGES     New Prescriptions    MIDODRINE (PROAMATINE) 2.5 MG TABLET    Take 1 tablet by mouth 3 times daily         FINAL DISPOSITION     Final diagnoses:   Near syncope   Hypotension, unspecified hypotension type     Condition: condition: stable  Dispo: Discharge to home        This transcription was electronically signed. Parts of this transcriptions may have been dictated by use of voice recognition software and electronically transcribed, and parts may have been transcribed with the assistance of an ED scribe. The transcription may contain errors not detected in proofreading. Please refer to my supervising physician's documentation if my documentation differs.     Electronically Signed: Marty Vega MD, 07/06/22, 5:57 PM         Shanna Ochoa MD  Resident  07/06/22 4409

## 2022-07-06 NOTE — CARE COORDINATION
Spoke with [employer] on [address]. They verified had received prescription for Midodrine, filled and will deliver tomorrow to patients home. This information was given to Dr. Good Godinez.

## 2022-07-06 NOTE — ED NOTES
Liter fluid bolus completed. Patient BP decreased to 89/46. Verbal order from ED resident provider to infuse another liter fluid bolus and recheck BP. Patient denies any concerns at this time. Other VS stable. Patient placed in trendelenburg.         Libertad Mar RN  07/06/22 0966

## 2022-07-06 NOTE — ED NOTES
Discharge instructions and follow up discussed with pt. Pt verbalized understanding and denied further questions. LDA removed. Pt discharged with all belongings.         Sandi Campuzano RN  07/06/22 1943

## 2022-07-07 ENCOUNTER — SOCIAL WORK (OUTPATIENT)
Dept: RADIATION ONCOLOGY | Age: 87
End: 2022-07-07

## 2022-07-07 NOTE — ED PROVIDER NOTES
[address] [facility]  EMERGENCY MEDICINE ATTENDING ATTESTATION      Evaluation of Latanya Rod. Case discussed and care plan developed with resident physician. I agree with the resident physician documentation and plan as documented by him, except if my documentation differs. Patient seen, interviewed and examined by me. I reviewed the medical, surgical, family and social history, medications and allergies. I have reviewed the nursing documentation. I have reviewed the patient's vital signs and are abnormal from Hypotension per my interpretation. Body mass index is 25.24 kg/m². Pulsoxymetry is normal per my interpretation. Brief H&P   Patient c/o presyncope, lightheadedness, hypotension    Physical exam is notable for chronically ill-appearing, no focal deficits      Medical Decision Making   MDM:   Patient is an 26-year-old female with a history of metastatic breast cancer with mets to the brain, s/p craniotomy x3 who presents with hypotension. Patient responded to IV fluid. Laboratory work-up unremarkable for etiology. Discussed patient presentation with her PCP, Dr. Vilma Chaney, who stated that patient was previously on midodrine for chronic hypotension which had been discontinued recently. He recommends restarting midodrine and given that patient is otherwise well-appearing, with no signs of infection or continued hypoperfusion at this time and he plans to see her soon in the office. Plan:    Restart midodrine 3 times daily   Return precautions   PCP follow-up    Please see the resident physician completed note for final disposition except as documented on this attestation. I have reviewed and interpreted all available lab, radiology and ekg results available at the moment. Diagnosis, treatment and disposition plans were discussed and agreed upon by patient. This transcription was electronically signed.  It was dictated by use of voice recognition software and electronically transcribed. The transcription may contain errors not detected in proofreading.      I performed direct supervision and was present for the critical portion following procedures: None  Critical care time on this case: None    Electronically signed by Ivonne Conrad MD on 7/7/22 at 3:14 AM EDT       Ivonne Conrad MD  07/07/22 1605

## 2022-07-07 NOTE — PROGRESS NOTES
SW called patient for a follow up for Distress Level Screening completed 7/06/2022. The patient did not answer and the greeting was a generic message so no call back number or message was left.

## 2022-07-08 ENCOUNTER — CARE COORDINATION (OUTPATIENT)
Dept: CARE COORDINATION | Age: 87
End: 2022-07-08

## 2022-07-08 SDOH — HEALTH STABILITY: PHYSICAL HEALTH: ON AVERAGE, HOW MANY DAYS PER WEEK DO YOU ENGAGE IN MODERATE TO STRENUOUS EXERCISE (LIKE A BRISK WALK)?: 0 DAYS

## 2022-07-08 SDOH — ECONOMIC STABILITY: TRANSPORTATION INSECURITY
IN THE PAST 12 MONTHS, HAS THE LACK OF TRANSPORTATION KEPT YOU FROM MEDICAL APPOINTMENTS OR FROM GETTING MEDICATIONS?: NO

## 2022-07-08 SDOH — ECONOMIC STABILITY: INCOME INSECURITY: IN THE LAST 12 MONTHS, WAS THERE A TIME WHEN YOU WERE NOT ABLE TO PAY THE MORTGAGE OR RENT ON TIME?: NO

## 2022-07-08 SDOH — HEALTH STABILITY: PHYSICAL HEALTH: ON AVERAGE, HOW MANY MINUTES DO YOU ENGAGE IN EXERCISE AT THIS LEVEL?: 0 MIN

## 2022-07-08 SDOH — ECONOMIC STABILITY: TRANSPORTATION INSECURITY
IN THE PAST 12 MONTHS, HAS LACK OF TRANSPORTATION KEPT YOU FROM MEETINGS, WORK, OR FROM GETTING THINGS NEEDED FOR DAILY LIVING?: NO

## 2022-07-08 SDOH — ECONOMIC STABILITY: HOUSING INSECURITY: IN THE LAST 12 MONTHS, HOW MANY PLACES HAVE YOU LIVED?: 1

## 2022-07-08 SDOH — ECONOMIC STABILITY: HOUSING INSECURITY
IN THE LAST 12 MONTHS, WAS THERE A TIME WHEN YOU DID NOT HAVE A STEADY PLACE TO SLEEP OR SLEPT IN A SHELTER (INCLUDING NOW)?: NO

## 2022-07-08 ASSESSMENT — SOCIAL DETERMINANTS OF HEALTH (SDOH)
IN A TYPICAL WEEK, HOW MANY TIMES DO YOU TALK ON THE PHONE WITH FAMILY, FRIENDS, OR NEIGHBORS?: MORE THAN THREE TIMES A WEEK
HOW OFTEN DO YOU ATTENT MEETINGS OF THE CLUB OR ORGANIZATION YOU BELONG TO?: NEVER
HOW OFTEN DO YOU GET TOGETHER WITH FRIENDS OR RELATIVES?: MORE THAN THREE TIMES A WEEK

## 2022-07-08 NOTE — CARE COORDINATION
Dr. Kemal Zelaya, I have enrolled Latanya into Care Coordination program to provide support and education to help manage health. Please approve Plan of Care using smart phrase . Ccplanofcare. Thank you!

## 2022-07-08 NOTE — CARE COORDINATION
Ambulatory Care Coordination Note  7/8/2022  CM Risk Score: 8  Charlson 10 Year Mortality Risk Score: 100%     ACC: Moose White, YADY    Summary Note: Spoke with Jennie Duenas. Introduced self/role. Was CTN referral for enrollment.   Discussed overall chronic health conditions and recent ED visit  PCP was present during ED visit and helped with interventions and medication changes  Not able to complete Med Rec during call as [address] sets up meds weekly  States she was able to start new medication as pharmacy delivered it to her yesterday  Niece was present during call  Follow up appt with wound clinic scheduled for wound on head  Attempted to make PCP appt but unable to make dates when PCP in office and declining to see other providers  Uses [employer] for transportation needs  Active with [address] to have headaches from chronic dx  Using walker and wheelchair, has fall hx  Denies the need for additional support in the home  Denies barriers with medication affordability    Plan  Provide education and support to help manage chronic conditions  Ensure follow up with wound clinic   Offer and help set up PCP appt around other appts and transportation schedule  Ensure HH in place  Reinforce importance of early symptom recognition and reporting to prevent exacerbation and unnecessary hospitalization  Reinforce fall education and prevention to reduce risk for injury related to falls  General Assessment    Do you have any symptoms that are causing concern?: Yes  Progression since Onset: Unchanged  Reported Symptoms: Other (Comment: headaches)             Ambulatory Care Coordination Assessment    Care Coordination Protocol  Referral from Primary Care Provider: No  Week 1 - Initial Assessment     Do you have all of your prescriptions and are they filled?: Yes  Barriers to medication adherence: None  Are you able to afford your medications?: Yes  How often do you have trouble taking your medications the way you have been told to take them?: I always take them as prescribed. Do you have Home O2 Therapy?: No      Ability to seek help/take action for Emergent Urgent situations i.e. fire, crime, inclement weather or health crisis.: Needs Assistance  Ability to ambulate to restroom: Needs Assistance  Ability handle personal hygeine needs (bathing/dressing/grooming): Needs Assistance  Ability to manage Medications: Dependent  Ability to prepare Food Preparation: Needs Assistance  Ability to maintain home (clean home, laundry): Dependent  Ability to drive and/or has transportation: Dependent  Ability to do shopping: Dependent  Ability to manage finances: Dependent  Is patient able to live independently?: Yes     Current Housing: Private Residence        Per the Fall Risk Screening, did the patient have 2 or more falls or 1 fall with injury in the past year?: Yes  How often do you think you are about to fall and you do NOT fall?  For example, you grab something to stabilize yourself or hold onto a wall/furniture?: Occasionally  Use of a Mobility Aid: Yes  Difficulty walking/impaired gait: Yes  Changes in vision, poor vision or poor lighting in environment: No  Dizziness: No     Frequent urination at night?: No  Do you use rails/bars?: No  Do you have a non-slip tub mat?: No     Are you experiencing loss of meaning?: No  Are you experiencing loss of hope and peace?: No     Thinking about your patient's physical health needs, are there any symptoms or problems (risk indicators) you are unsure about that require further investigation?: Mild vague physical symptoms or problems; but do not impact on daily life or are not of concern to patient   Are the patients physical health problems impacting on their mental well-being?: Mild impact on mental well-being e.g. \"\"feeling fed-up\"\", \"\"reduced enjoyment\"\"   Are there any problems with your patients lifestyle behaviors (alcohol, drugs, diet, exercise) that are impacting on physical or mental well-being?: Moderate to severe impact on well-being, preventing enjoyment of usual activities   Do you have any other concerns about your patients mental well-being? How would you rate their severity and impact on the patient?: Mild problems - don't interfere with function   How would you rate their home environment in terms of safety and stability (including domestic violence, insecure housing, neighbor harassment)?: Safe, stable, but with some inconsistency   How do daily activities impact on the patient's well-being? (include current or anticipated unemployment, work, caregiving, access to transportation or other): Some general dissatisfaction but no concern   How would you rate their social network (family, work, friends)?: Adequate participation with social networks   How would you rate their financial resources (including ability to afford all required medical care)?: Financially secure, some resource challenges   How wells does the patient now understand their health and well-being (symptoms, signs or risk factors) and what they need to do to manage their health?: Little understanding which impacts on their ability to undertake better management   How well do you think your patient can engage in healthcare discussions? (Barriers include language, deafness, aphasia, alcohol or drug problems, learning difficulties, concentration): Some difficulties in communication with or without moderate barriers   Do other services need to be involved to help this patient?: Other care/services in place and adequate   Are current services involved with this patient well-coordinated? (Include coordination with other services you are now recommendation):  All required care/services in place and well-coordinated   Suggested Interventions and [place]: Completed   Medication Assistance Program: Not Started   Medi Set or Pill Pack: Completed   Occupational Therapy: Not Started   Palliative Care: Not Started   Physical Therapy: Not Started   Senior Services: Completed   Social Work: Not Started   Transportation Services: Completed                  Prior to Admission medications    Medication Sig Start Date End Date Taking?  Authorizing Provider   midodrine (PROAMATINE) 2.5 MG tablet Take 1 tablet by mouth 3 times daily 7/6/22   Varinder Hull MD   sertraline (ZOLOFT) 50 MG tablet take 1 tablet by mouth twice a day 6/28/22   Radha Gonzalez MD   Calcium Carbonate-Vitamin D (OYSTER SHELL CALCIUM/D) 500-200 MG-UNIT TABS take 1 tablet by mouth twice a day 6/28/22   Radha Gonzalez MD   Biotin 1000 MCG TABS Take 1 tablet by mouth in the morning and at bedtime 6/27/22   Radha Gonzalez MD   RA BIOTIN 1000 MCG TABS Take 1,000 mcg by mouth 2 times daily 6/21/22   Stephanie Gill MD   vitamin D (ERGOCALCIFEROL) 1.25 MG (08404 UT) CAPS capsule take 1 capsule by mouth every week 6/21/22   Stephanie Gill MD   amiodarone (CORDARONE) 200 MG tablet Take 1 tablet by mouth daily 5/19/22 7/5/22  Horacio Warren,    metoprolol tartrate (LOPRESSOR) 25 MG tablet Take 0.5 tablets by mouth 2 times daily 5/19/22   Horacio Warren, DO   levothyroxine (SYNTHROID) 100 MCG tablet take 1 tablet by mouth once daily 5/4/22   Radha Gonzalez MD   ALPRAZolam Dahlia Mech) 0.25 MG tablet take 1 tablet by mouth twice a day if needed for anxiety 5/3/22 8/1/22  Radha Gonzalez MD   isosorbide mononitrate (IMDUR) 30 MG extended release tablet take 1 tablet by mouth once daily 4/1/22   Radha Gonzalez MD   simvastatin (ZOCOR) 20 MG tablet take 1 tablet by mouth every evening 3/28/22   Radha Gonzalez MD   docusate (COLACE, DULCOLAX) 100 MG CAPS Take 100 mg by mouth 2 times daily 3/22/22   Radha Gonzalez MD   Nebulizers (COMPRESSOR/NEBULIZER) MISC 1 Device by Does not apply route 4 times daily as needed (Shortness of breath and wheezing) 3/3/22   Radha Gonzalez MD   albuterol (PROVENTIL) (2.5 MG/3ML) 0.083% nebulizer solution Take 3 mLs by nebulization every 6 hours as needed for Wheezing 2/28/22   Joanna Catalan MD   polyethylene glycol [facility]) 17 GM/SCOOP powder Take 17 g by mouth daily as needed (constipation) 2/16/22 8/15/22  Tamika Caro MD   pantoprazole (PROTONIX) 40 MG tablet take 1 tablet by mouth once daily 2/10/22   Tamika Caro MD   zinc sulfate (ZINCATE) 220 (50 Zn) MG capsule Take 1 capsule by mouth daily 1/28/22   Joanna Catalan MD   albuterol sulfate  (90 Base) MCG/ACT inhaler Inhale 2 puffs into the lungs every 6 hours as needed for Wheezing or Shortness of Breath 1/25/22   Tamela Araya MD   predniSONE (DELTASONE) 1 MG tablet Take 1 tablet by mouth daily - resume after completes decadron 1/25/22   Tamela Araya MD   levETIRAcetam (KEPPRA) 500 MG tablet take 1 tablet by mouth twice a day 11/16/21   Claudetta Shilling, MD   loratadine (CLARITIN) 10 MG tablet take 1 tablet by mouth once daily 11/16/21   Tamika Caro MD   REFRESH PLUS 0.5 % SOLN ophthalmic solution use as directed 9/9/21   Historical Provider, MD   ondansetron (ZOFRAN-ODT) 4 MG disintegrating tablet Take 4 mg by mouth every 8 hours as needed 8/5/21   Historical Provider, MD   traZODone (DESYREL) 50 MG tablet take 1 tablet by mouth at bedtime if needed for sleep 8/16/21   Tamika Caro MD   melatonin 3 MG TABS tablet Take 1 tablet by mouth nightly as needed (insomnia) 7/20/21   Claudetta Shilling, MD   Nutritional Supplements (ENSURE ORIGINAL) LIQD Take 1 Can by mouth 2 times daily 5/13/21   Tamika Caro MD   promethazine (PHENERGAN) 25 MG tablet Take 1 tablet by mouth every 6 hours as needed for Nausea (and vomiting) 8/11/20   Tamika Caro MD   Multiple Vitamins-Minerals (CENTRUM SILVER ADULT 50+) TABS Take 1 tablet by mouth daily 12/7/15   Noah Torres MD       Future Appointments   Date Time Provider Irene Shah   7/14/2022  1:30 PM Verena Bocanegra, APRN - [address]   8/23/2022 1:30 PM Miranda Kearney, APRN - [facility]   10/11/2022  1:30 PM Gato Borges MD [facility]

## 2022-07-12 ENCOUNTER — CLINICAL DOCUMENTATION (OUTPATIENT)
Dept: SPIRITUAL SERVICES | Facility: CLINIC | Age: 87
End: 2022-07-12

## 2022-07-12 NOTE — PROGRESS NOTES
[place] [address] PROGRESS NOTE      Patient: Veronique Evans           YOB: 1933  Age: 80 y.o. Gender: female            Assessment:  Rhea Cast is an 80year old female who is being referred to spiritual care by the nurse navigator at oncology center. A  had given Latanya a distress screener and she scored high for concerns over her physical and spiritual areas. Interventions: This  called her number and no one answered the call. A message with my office number was left on the answering machine. Plan:    1. Follow up and call again soon.     Electronically signed by Ama Coates, on 7/12/2022 at 10:29 AM.  [facility]  [phone]

## 2022-07-19 RX ORDER — AMIODARONE HYDROCHLORIDE 200 MG/1
200 TABLET ORAL DAILY
Qty: 30 TABLET | Refills: 0 | Status: SHIPPED | OUTPATIENT
Start: 2022-07-19 | End: 2022-08-16

## 2022-07-19 NOTE — TELEPHONE ENCOUNTER
Hillary Vicente with [facility] called for a refill of:    amiodarone (CORDARONE) 200 MG tablet QD    Send to [facility] on [address]

## 2022-07-19 NOTE — TELEPHONE ENCOUNTER
Date of last visit:  7/5/2022  Date of next visit:  10/11/2022    Requested Prescriptions     Pending Prescriptions Disp Refills    amiodarone (CORDARONE) 200 MG tablet 30 tablet 0     Sig: Take 1 tablet by mouth in the morning.

## 2022-07-21 ENCOUNTER — HOSPITAL ENCOUNTER (OUTPATIENT)
Dept: WOUND CARE | Age: 87
Discharge: HOME | End: 2022-07-21
Payer: MEDICARE

## 2022-07-21 VITALS
HEART RATE: 53 BPM | TEMPERATURE: 97.5 F | RESPIRATION RATE: 16 BRPM | SYSTOLIC BLOOD PRESSURE: 142 MMHG | OXYGEN SATURATION: 94 % | DIASTOLIC BLOOD PRESSURE: 66 MMHG

## 2022-07-21 DIAGNOSIS — T81.89XD NON-HEALING SURGICAL WOUND, SUBSEQUENT ENCOUNTER: ICD-10-CM

## 2022-07-21 DIAGNOSIS — T81.31XD DEHISCENCE OF OPERATIVE WOUND, SUBSEQUENT ENCOUNTER: Primary | ICD-10-CM

## 2022-07-21 PROCEDURE — 99213 OFFICE O/P EST LOW 20 MIN: CPT | Performed by: NURSE PRACTITIONER

## 2022-07-21 PROCEDURE — 99213 OFFICE O/P EST LOW 20 MIN: CPT

## 2022-07-21 NOTE — DISCHARGE INSTR - COC
Continuity of Care Form    Patient Name: Barrett Magallanes   :  1933  MRN:  [id number]    Admit date:  2022  Discharge date:  ***    Code Status Order: Prior   Advance Directives:     Admitting Physician:  No admitting provider for patient encounter. PCP: Hari Holly MD    Discharging Nurse: [facility] Unit/Room#: No information available for this encounter. Discharging Unit Phone Number: ***    Emergency Contact:   Extended Emergency Contact Information  Primary Emergency Contact: Nori Smyth, [address] [facility] Madhuri Blotter of [address] [place] [address] Phone: [phone]  Relation: Niece/Nephew  Secondary Emergency Contact: [facility] Phone: [phone]  Relation: Brother/Sister  Preferred language: [place]   needed?  No    Past Surgical History:  Past Surgical History:   Procedure Laterality Date    CARDIAC CATHETERIZATION      CATARACT REMOVAL  right ; left     CHOLECYSTECTOMY      COLONOSCOPY      COLOSTOMY  2018    REVERSAL OF COLOSTOMY    CRANIOTOMY Right 2021    CRANIOTOMY FOR RESECTION/DEBULKING OF RIGHT SIDE INTRA BRAIN TUMOR performed by Fazal Harkins MD at [facility] Right 2022    RIGHT SIDED CRANIOTOMY FOR TUMOR performed by Fazal Harkins MD at [facility], COLON, DIAGNOSTIC      EYE REMOVAL Right 2017    EYE SURGERY      EYE SURGERY Right 2017    Dr Erika Hutchins in [facility] ([address] [place] [address])      partial    OTHER SURGICAL HISTORY  2016    robotic assisted laparoscopic anterior colon resection and end colostomy    [place] OFFICE/OUTPT VISIT,PROCEDURE ONLY N/A 2018    COLOSTOMY REVERSAL AND PARASTOMAL HERNIA REPAIR performed by Nathalie Tolbert MD at [address] [place] [address] / PULMONARY SHUNT      UPPER GASTROINTESTINAL ENDOSCOPY       BREAST NEEDLE BIOPSY LEFT Left 2016    [facility]       Immunization History:   Immunization History   Administered Date(s) Administered    Influenza 09/25/2012    Influenza Virus Vaccine 10/19/2011, 10/28/2014, 12/01/2015    Influenza, Juan Jose Jackson, IM, (6 mo and older Fluzone, Flulaval, Fluarix and 3 yrs and older Afluria) 10/31/2017, 12/11/2018, 09/17/2019, 12/08/2020    PPD Test 06/01/2016, 06/08/2016, 09/11/2018, 05/25/2019, 06/02/2019    Pneumococcal Conjugate 13-valent Poli [employer]) 12/01/2015    Pneumococcal Polysaccharide ([id number]) 11/19/2007    Td (Adult), 5 Lf Tetanus Toxoid, Pf (Tenivac, Decavac) 10/30/2019       Active Problems:  Patient Active Problem List   Diagnosis Code    Hypothyroidism E03.9    GERD (gastroesophageal reflux disease) K21.9    Temporal arteritis (HCC) M31.6    Hyperlipidemia E78.5    Hypothyroidism due to acquired atrophy of thyroid E03.4    Steroid-induced hyperglycemia R73.9, T38.0X5A    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 XXXA, Y92.009    Metastatic breast cancer ([facility] [address] C50.919    Status post craniotomy Z98.890    Impaired functional mobility, balance, gait, and endurance Z74.09    Acute cystitis without hematuria N30.00    History of craniotomy Z98.890    Pulmonary nodules R91.8    Essential hypertension I10    Stenosis of left subclavian artery (HCC) I77.1    Brain metastases (HCC) C79.31    Confusion R41.0    Chronic atrial fibrillation (HCC) I48.20    Breast 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 (HCC) C50.919, C79.31    Intractable headache R51.9    Nonhealing surgical wound T81.89XA Surgical wound dehiscence T81.31XA       Isolation/Infection:   Isolation            No Isolation          Patient Infection Status       Infection Onset Added Last Indicated Last Indicated By Review Planned Expiration Resolved Resolved By    None active    Resolved    COVID-19 (Rule Out) 22 COVID-19, Rapid (Ordered)   22 Rule-Out Test Resulted    COVID-19 22 COVID-19, Rapid   22     S/S , +    COVID-19 (Rule Out) 22 COVID-19, Rapid (Ordered)   22 Rule-Out Test Resulted    COVID-19 (Rule Out) 10/29/21 10/29/21 10/29/21 COVID-19, Rapid (Ordered)   10/29/21 Rule-Out Test Resulted    C-diff Rule Out 20 Gastrointestinal Panel, Molecular (Ordered)   20 Rule-Out Test Resulted            Nurse Assessment:  Last Vital Signs: BP (!) 142/66   Pulse 53   Temp 97.5 °F (36.4 °C) (Infrared)   Resp 16   LMP  (LMP Unknown) Comment: age 52 ovaries intact  SpO2 94%     Last documented pain score (0-10 scale):    Last Weight:   Wt Readings from Last 1 Encounters:   22 138 lb (62.6 kg)     Mental Status:  {IP PT MENTAL STATUS:45045}    IV Access:  { TEJA IV ACCESS:888374420}    Nursing Mobility/ADLs:  Walking   {CHP DME WTBP:557071147}  Transfer  {CHP DME IVWA:635106779}  Bathing  {P DME GEAM:444582838}  Dressing  {CHP DME HFLL:516287685}  Toileting  {CHP DME OHNB:841985978}  Feeding  {CHP DME FSOE:610678661}  Med Admin  {CHP DME OTFL:856534822}  Med Delivery   { TEJA MED Delivery:343920666}    Wound Care Documentation and Therapy:  Wound 22 Head Right #2 (Active)   Wound Image   22 1121   Wound Etiology Non-Healing Surgical 22 1121   Dressing Status Other (Comment) 22 1121   Wound Cleansed Cleansed with saline 22 1121   Dressing/Treatment Open to air 22 1121   Offloading for Diabetic Foot Ulcers Offloading not required 22 1121   Wound Length {Esignature:621795623}    CASE MANAGEMENT/SOCIAL WORK SECTION    Inpatient Status Date: ***    Readmission Risk Assessment Score:  Readmission Risk              Risk of Unplanned Readmission:  0           Discharging to Facility/ Agency   Name:   Address:  Phone:  Fax:    Dialysis Facility (if applicable)   Name:  Address:  Dialysis Schedule:  Phone:  Fax:    / signature: {Esignature:363519593}    PHYSICIAN SECTION    Prognosis: {Prognosis:9157470340}    Condition at Discharge: [address] [place] [address] Patient Condition:676436612}    Rehab Potential (if transferring to Rehab): {Prognosis:0317868750}    Recommended Labs or Other Treatments After Discharge: ***    Physician Certification: I certify the above information and transfer of Zaid Kumar  is necessary for the continuing treatment of the diagnosis listed and that she requires {Admit to Appropriate Level of Care:60741} for {GREATER/LESS:779227331} 30 days.      Update Admission H&P: {CHP DME Changes in GXMST:997022273}    PHYSICIAN SIGNATURE:  {Esignature:498447188}

## 2022-07-21 NOTE — DISCHARGE INSTRUCTIONS
Discharge Instructions        Visit Discharge/Physician Orders:   - Call Dr Salbador Zapata office regarding increased pain and pressure in head. [phone] is his office number     Home Care: continued care [facility]     Wound Location: scalp     Dressing orders:     1) Gather wound care supplies and arrange on clean table. 2) Wash your hands with soap and water or use alcohol based hand  for 20 seconds (sing \"Happy Birthday\" twice). 3) Cleanse wounds with normal saline or wound cleanser and gauze. Pat dry with clean gauze. 4) open areas x 2 on right side of head- wash the head with chlorhexidine soap and a clean wash cloth, pat dry. Use this soap 1-2 times weekly. Keep all dressings clean & dry. Follow up visit: As needed. Call with any concerns. Keep next scheduled appointment. Please give 24 hour notice if unable to keep appointment. [phone]     If you experience any of the following, please call the Wound Care Service during business hours: Monday through Friday 8:00 am - 4:30 pm  ([phone]). *Increase in pain              *Temperature over 101              *Increase in drainage from your wound or a foul odor              *Uncontrolled swelling              *Need for compression bandage changes due to slippage, breakthrough drainage     If you need medical attention outside of business hours, please contact your Primary Care Doctor or go to the nearest emergency room.

## 2022-07-21 NOTE — PROGRESS NOTES
[address] [facility] and Procedure Note      Veronique Evans  MEDICAL RECORD NUMBER:  [id number]  AGE: 80 y.o. GENDER: female  : 1933  EPISODE DATE:  2022    SUBJECTIVE:     Chief Complaint   Patient presents with    Wound Check     Right side of scalp           HISTORY OF PRESENT ILLNESS      Veronique Evans is a 80 y.o. female who presents today for evaluation of nonhealing surgical wound to head. Patient is s/p re-exploration craniotomy and resection of recurrent brain lesion per Dr. Deng Oglesby 22. At follow up visit  with PA for suture removal surgical wound dehiscence was noted to proximal portion of wound prompting referral to clinic. At last visit patient was advised to cover with duoderm thin and wash twice weekly with chlorhexidine gluconate to decrease risk for infection and promote healing. She states that she has not been doing either of these. Daughter, present for visit, states that bottle of chlorhexidine is sitting on her table at home but she was unaware she was supposed to be using this. She notes difficulties with her short term memory post operatively and headaches. Daughter also voices concerns regarding new complaints of pressure in her head- has not yet discussed this with Dr. Woods [place] office. She is current with home health-weekly visits per nursing and daily aide. Patient is legally blind, ambulates with walker. She denies any fevers or chills. Denies any further needs or concerns.     PAST MEDICAL HISTORY             Diagnosis Date    Anxiety     Blind right eye     Breast cancer ([facility] [address] 2016    IDC @ [facility] only chemo, no surgery    Breast cancer metastasized to liver () and brain () ([address] 05/10/2016    Liver lesion noted  : Brain     Carotid stenosis      Left ICA 25%    Colostomy in place ([address] 2016    Degenerative arthritis of cervical spine 2007    GERD (gastroesophageal reflux disease) 2006    History of craniotomy 07/2021    mets from breast CA    Hx antineoplastic chemo 2016    left breast cancer, no surgery    Hypercholesteremia     Hypoestrogenism     Hypothyroidism     Lumbago     Lumbosacral spinal stenosis 05/2019    MDRO (multiple drug resistant organisms) resistance 2008    pt stated cleared    Metastasis ([facility] [address] 2019    from breast to brain    Personal history of cardiac dysrhythmia     Respiratory tract infection due to COVID-19 virus 01/21/2022    Sigmoid diverticulosis 08/2004    Spondylolisthesis of lumbosacral region 08/2004    Steroid-induced hyperglycemia     Subclavian artery stenosis (HCC)     left    Superior and Inferior Pubic ramus fracture, right, closed, initial encounter ([facility] [address] 05/21/2019    SVT (supraventricular tachycardia) ([facility] [address] 06/2007    Temporal arteritis ([facility] [address]     Vertebral artery occlusion     left    Vitamin D deficiency 06/2017       PAST SURGICAL HISTORY     Past Surgical History:   Procedure Laterality Date    CARDIAC CATHETERIZATION  5/07    CATARACT REMOVAL  right 1/08; left 2/08    CHOLECYSTECTOMY  1/03    COLONOSCOPY  11/06    COLOSTOMY  09/04/2018    REVERSAL OF COLOSTOMY    CRANIOTOMY Right 7/2/2021    CRANIOTOMY FOR RESECTION/DEBULKING OF RIGHT SIDE INTRA BRAIN TUMOR performed by Hayley Romo MD at [address] Right 5/11/2022    RIGHT SIDED CRANIOTOMY FOR TUMOR performed by Hayley Romo MD at [place] [address], COLON, DIAGNOSTIC      EYE REMOVAL Right 03/2017    EYE SURGERY      EYE SURGERY Right 11/06/2017    Dr BELTRAN [facility] in [facility] ([address] [place] [address])      partial    OTHER SURGICAL HISTORY  05/27/2016    robotic assisted laparoscopic anterior colon resection and end colostomy    [place] OFFICE/OUTPT VISIT,PROCEDURE ONLY N/A 9/4/2018    COLOSTOMY REVERSAL AND PARASTOMAL HERNIA REPAIR performed by Tasneem Silvestre MD at [facility] / [address] [place] [address] ENDOSCOPY  11/06     BREAST NEEDLE BIOPSY LEFT Left 05/23/2016    IDC       FAMILY HISTORY     Family History   Problem Relation Age of Onset    Breast Cancer Sister 61        breast    Cancer Brother 79        lung    Cancer Brother 68        colon    Cancer Son 48        lung       SOCIAL HISTORY     Social History     Tobacco Use    Smoking status: Former     Packs/day: 0.50     Types: Cigarettes    Smokeless tobacco: Never   Vaping Use    Vaping Use: Never used   Substance Use Topics    Alcohol use: No    Drug use: No       ALLERGIES     Allergies   Allergen Reactions    Bactrim [Sulfamethoxazole-Trimethoprim] Swelling     Of tongue    Demerol Rash     nausea    Pcn [Penicillins] Rash       MEDICATIONS     Current Outpatient Medications on File Prior to Encounter   Medication Sig Dispense Refill    amiodarone (CORDARONE) 200 MG tablet Take 1 tablet by mouth in the morning.  30 tablet 0    midodrine (PROAMATINE) 2.5 MG tablet Take 1 tablet by mouth 3 times daily 90 tablet 3    sertraline (ZOLOFT) 50 MG tablet take 1 tablet by mouth twice a day 180 tablet 1    Calcium Carbonate-Vitamin D (OYSTER SHELL CALCIUM/D) 500-200 MG-UNIT TABS take 1 tablet by mouth twice a day 180 tablet 3    Biotin 1000 MCG TABS Take 1 tablet by mouth in the morning and at bedtime 60 tablet 3    RA BIOTIN 1000 MCG TABS Take 1,000 mcg by mouth 2 times daily 60 tablet 5    vitamin D (ERGOCALCIFEROL) 1.25 MG (28431 UT) CAPS capsule take 1 capsule by mouth every week 5 capsule 5    metoprolol tartrate (LOPRESSOR) 25 MG tablet Take 0.5 tablets by mouth 2 times daily 60 tablet 3    levothyroxine (SYNTHROID) 100 MCG tablet take 1 tablet by mouth once daily 90 tablet 0    ALPRAZolam (XANAX) 0.25 MG tablet take 1 tablet by mouth twice a day if needed for anxiety 60 tablet 2    isosorbide mononitrate (IMDUR) 30 MG extended release tablet take 1 tablet by mouth once daily 90 tablet 1    simvastatin (ZOCOR) 20 MG tablet take 1 tablet by mouth every evening 90 tablet 1    docusate BP (!) 142/66   Pulse 53   Temp 97.5 °F (36.4 °C) (Infrared)   Resp 16   LMP  (LMP Unknown) Comment: age 52 ovaries intact  SpO2 94%   Wt Readings from Last 3 Encounters:   07/06/22 138 lb (62.6 kg)   07/06/22 138 lb (62.6 kg)   07/05/22 136 lb 9.6 oz (62 kg)       General:  Awake, alert, no apparent distress. Appears stated age  Chest:  Respirations regular, non-labored. Chest rise and fall equal bilaterally. Neurological: Awake, alert, forgetful  Psychiatric:  Appropriate mood and affect  Extremities: non-traumatic in appearance. Skin:  Warm and dry  Wound:    One small open areas along incision line with exposed hardware to wound bed. Periwound dry, intact. No fluctuance or warmth to the touch noted. No drainage observed today. Wound 06/20/22 Head Right #2 (Active)   Wound Image   07/21/22 1121   Wound Etiology Non-Healing Surgical 07/21/22 1121   Dressing Status Other (Comment) 07/21/22 1121   Wound Cleansed Cleansed with saline 07/21/22 1121   Dressing/Treatment Open to air 07/21/22 1121   Offloading for Diabetic Foot Ulcers Offloading not required 07/21/22 1121   Wound Length (cm) 0.1 cm 07/21/22 1121   Wound Width (cm) 0.2 cm 07/21/22 1121   Wound Depth (cm) 0.1 cm 07/21/22 1121   Wound Surface Area (cm^2) 0.02 cm^2 07/21/22 1121   Change in Wound Size % (l*w) 50 07/21/22 1121   Wound Volume (cm^3) 0.002 cm^3 07/21/22 1121   Wound Healing % 83 07/21/22 1121   Wound Assessment Exposed hardware 07/21/22 1121   Drainage Amount None 07/21/22 1121   Odor None 07/21/22 1121   Shima-wound Assessment Dry/flaky; Induration;Blanchable erythema 07/21/22 1121   Margins Attached edges 07/21/22 1121   Wound Thickness Description not for Pressure Injury Full thickness 07/21/22 1121   Number of days: 31       LABS/IMAGING       UA: No results for input(s): SPECGRAV, PHUR, COLORU, CLARITYU, MUCUS, PROTEINU, BLOODU, RBCUA, WBCUA, BACTERIA, NITRU, GLUCOSEU, BILIRUBINUR, UROBILINOGEN, KETUA, LABCAST, LABCASTTY, AMORPHOS in the last 72 hours. Invalid input(s): CRYSTALS  Micro:   Lab Results   Component Value Date/Time    BC No growth-preliminary No growth  01/21/2022 10:50 PM         ASSESSMENT     -nonhealing surgical wound  -surgical wound dehiscence    Ulcer Identification:  Ulcer Type: non-healing surgical    Depth of Diabetic/Pressure/Non Pressure Ulcers or Wound:  Wound, full thickness     Patient Active Problem List   Diagnosis Code    Hypothyroidism E03.9    GERD (gastroesophageal reflux disease) K21.9    Temporal arteritis (HCC) M31.6    Hyperlipidemia E78.5    Hypothyroidism due to acquired atrophy of thyroid E03.4    Steroid-induced hyperglycemia R73.9, T38.0X5A    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 XXXA, Y92.009    Metastatic breast cancer ([address] C50.919    Status post craniotomy Z98.890    Impaired functional mobility, balance, gait, and endurance Z74.09    Acute cystitis without hematuria N30.00    History of craniotomy Z98.890    Pulmonary nodules R91.8    Essential hypertension I10    Stenosis of left subclavian artery (HCC) I77.1    Brain metastases (HCC) C79.31    Confusion R41.0    Chronic atrial fibrillation (HCC) I48.20    Breast 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 (HCC) C50.919, C79.31    Intractable headache R51.9    Nonhealing surgical wound T81.89XA    Surgical wound dehiscence T81.31XA       PLAN     Patient examined and evaluated.     All available lab work, radiology studies, and progress notes pertaining to [facility] reviewed prior to or during patient visit today.    -nonhealing surgical wound, surgical wound dehiscence- One small open area remains along incision line, no drainage noted. Patient states she prefers to leave open to air. Advised her to keep area clean and dry. I do recommend she clean With chlorhexidine gluconate 1-2 times weekly to help keep clean and decrease bacterial load on scalp. Avoid use of regular shampoos due to scents and moisturizers.      -No indications of infection to wounds today. Education provided on signs and symptoms of infection. Call clinic or seek emergency care should these occur.    -Encouraged patient and her daughter to discuss new concerns for pressure in her head with surgeon. Seek emergency care with any new onset or worsening of symptoms. Follow up as needed. Advised patient to call clinic with any needs or concerns prior to scheduled visit. All questions and concerns addressed prior to discharge from today's visit. Please see attached Discharge Instructions    Written patient dismissal instructions given to patient and signed by patient or POA. Treatment:   Orders Placed This Encounter   Procedures    Misc nursing order (specify)     Discharge Instructions      Visit Discharge/Physician Orders:   - Call Dr Salbador Zapata office regarding increased pain and pressure in head. [phone] is his office number     Home Care: continued care [place]     Wound Location: scalp     Dressing orders:     1) Gather wound care supplies and arrange on clean table. 2) Wash your hands with soap and water or use alcohol based hand  for 20 seconds (sing \"Happy Birthday\" twice). 3) Cleanse wounds with normal saline or wound cleanser and gauze. Pat dry with clean gauze. 4) open areas x 2 on right side of head- wash the head with chlorhexidine soap and a clean wash cloth, pat dry. Use this soap 1-2 times weekly.      Keep all dressings clean & dry. Follow up visit: As needed. Call with any concerns. Keep next scheduled appointment. Please give 24 hour notice if unable to keep appointment. [phone]     If you experience any of the following, please call the Wound Care Service during business hours: Monday through Friday 8:00 am - 4:30 pm  ([phone]). *Increase in pain              *Temperature over 101              *Increase in drainage from your wound or a foul odor              *Uncontrolled swelling              *Need for compression bandage changes due to slippage, breakthrough drainage     If you need medical attention outside of business hours, please contact your Primary Care Doctor or go to the nearest emergency room. I spent a total of 20 minutes reviewing previous notes, test results and face to face with Latanya Rod  on 7/21/2022. Greater than 50% of this time was spent on counseling, reviewing and discussing test results, answering questions, instructions on treatment and/or medications ordered, and coordinating the care based on my plan and assessment as noted. Discharge Instructions     Discharge Instructions           Discharge Instructions        Visit Discharge/Physician Orders:   - Call Dr Judge Duran office regarding increased pain and pressure in head. [phone] is his office number     Home Care: continued care [facility]     Wound Location: scalp     Dressing orders:     1) Gather wound care supplies and arrange on clean table. 2) Wash your hands with soap and water or use alcohol based hand  for 20 seconds (sing \"Happy Birthday\" twice). 3) Cleanse wounds with normal saline or wound cleanser and gauze. Pat dry with clean gauze. 4) open areas x 2 on right side of head- wash the head with chlorhexidine soap and a clean wash cloth, pat dry. Use this soap 1-2 times weekly. Keep all dressings clean & dry. Follow up visit: As needed. Call with any concerns. Keep next scheduled appointment. Please give 24 hour notice if unable to keep appointment. [phone]     If you experience any of the following, please call the Wound Care Service during business hours: Monday through Friday 8:00 am - 4:30 pm  ([phone]). *Increase in pain              *Temperature over 101              *Increase in drainage from your wound or a foul odor              *Uncontrolled swelling              *Need for compression bandage changes due to slippage, breakthrough drainage     If you need medical attention outside of business hours, please contact your Primary Care Doctor or go to the nearest emergency room.        Electronically signed by PAMELA William CNP on 7/21/2022 at 2:05 PM

## 2022-07-22 ENCOUNTER — CARE COORDINATION (OUTPATIENT)
Dept: CARE COORDINATION | Age: 87
End: 2022-07-22

## 2022-07-24 DIAGNOSIS — C79.31 MALIGNANT NEOPLASM OF BREAST METASTATIC TO BRAIN, UNSPECIFIED LATERALITY (HCC): ICD-10-CM

## 2022-07-24 DIAGNOSIS — C50.919 MALIGNANT NEOPLASM OF BREAST METASTATIC TO BRAIN, UNSPECIFIED LATERALITY (HCC): ICD-10-CM

## 2022-07-24 DIAGNOSIS — Z98.890 STATUS POST CRANIOTOMY: ICD-10-CM

## 2022-07-24 RX ORDER — HYDROCODONE BITARTRATE AND ACETAMINOPHEN 5; 325 MG/1; MG/1
1 TABLET ORAL
Qty: 24 TABLET | Refills: 0 | Status: SHIPPED | OUTPATIENT
Start: 2022-07-24 | End: 2022-07-24

## 2022-07-25 ENCOUNTER — CARE COORDINATION (OUTPATIENT)
Dept: CARE COORDINATION | Age: 87
End: 2022-07-25

## 2022-07-26 ENCOUNTER — HOSPITAL ENCOUNTER (OUTPATIENT)
Dept: RADIATION ONCOLOGY | Age: 87
Discharge: HOME | DRG: 054 | End: 2022-07-26
Payer: MEDICARE

## 2022-07-26 VITALS
SYSTOLIC BLOOD PRESSURE: 168 MMHG | OXYGEN SATURATION: 93 % | RESPIRATION RATE: 16 BRPM | TEMPERATURE: 98 F | DIASTOLIC BLOOD PRESSURE: 70 MMHG | HEART RATE: 54 BPM | BODY MASS INDEX: 25.06 KG/M2 | WEIGHT: 137 LBS

## 2022-07-26 DIAGNOSIS — C79.31 BRAIN METASTASES (HCC): Primary | ICD-10-CM

## 2022-07-26 PROCEDURE — 99213 OFFICE O/P EST LOW 20 MIN: CPT | Performed by: RADIOLOGY

## 2022-07-26 PROCEDURE — 99212 OFFICE O/P EST SF 10 MIN: CPT | Performed by: RADIOLOGY

## 2022-07-26 ASSESSMENT — ENCOUNTER SYMPTOMS
TROUBLE SWALLOWING: 0
COUGH: 0
SORE THROAT: 0
NAUSEA: 0
BLOOD IN STOOL: 0
FACIAL SWELLING: 0
SHORTNESS OF BREATH: 0
ABDOMINAL PAIN: 0
RECTAL PAIN: 0

## 2022-07-26 NOTE — PROGRESS NOTES
[address] [facility]           [address], [address]        [place],  [facility]        Cipriano Due: [phone]        F: [phone]       [email or web address]            FOLLOW UP NOTE    Date of Service: 2022  Patient ID: Jillian Mauro   : 1933  MRN: [id number]   Acct Number: [de-identified]       DATE OF SERVICE: 2022   LOCATION: [facility]  PROVIDER: Tayla Olivera MD MS    FOLLOW UP PHYSICIANS: Dr. Haven Garcia (NeuroSx) ; Shay Batista  (Med/Onc)    ASSESSMENT AND PLAN:  Cancer Staging  Metastatic breast cancer [facility])  Staging form: Breast, AJCC 8th Edition  - Clinical stage from 5/10/2018: Stage IV (rcT1c, cN0, cM1, G3, ER+, WY+, HER2+) - Signed by Jaycee Ricks MD on 2021    Ms. Shweta Patel presents today for extended follow-up in regards to palliative radiation to the brain for her metastatic breast cancer. First we will order MRI brain for most updated imaging. We will discuss with the patient following the MRI results. If the patient is agreeable to radiation at that juncture, we will plan to proceed with CT simulation and planning for radiation treatments. This will entail high dose focal radiation to the brain [facility]) or whole brain radiation therapy, depending on MRI results. Currently, the patient is agreeable to receive radiation. The patient will return to clinic following MRI results or sooner if clinically indicated. They have our clinic number to call with any questions or concerns. Thank you for allowing us to be part of their care. HPI:  Oncology History Overview Note   Jillian Mauro is a 80 y.o. female with a history of breast cancer which was metastatic at the time of diagnosis, in 2018. She had liver lesions and colonoscopy was negative, +breast mass. The liver biopsy on 05/10/2016 was positive for liver metastasis with breast primary. She underwent chemotherapy and Herceptin. She did very well and responded completely. Last chemotherapy was somewhere in 07/2019, two years ago. She has been doing well since then. Recently, she had frequent falls and she has hit her head and she was brought to the emergency room and was found to have a large, 5-cm brain mass in the right occipitotemporal lobe junction. Dr. Crescencio Drew performed a craniotomy and resection of the mass on 07/02/2021 and the final pathology showed that is a poorly differentiated carcinoma consistent with breast carcinoma. ER, MT, and HER2 are not done. The postsurgery MRI done on 07/04/2021 showed large cavity at the surgical site. No other lesions were found. 06/28/21 CT Chest    Impression   1. No acute traumatic intrathoracic findings. 06/28/21 CT Abd/pelv    Impression   Multiple chronic findings. No acute abdominal or pelvic abnormality. 07/01/21 MRI Brain w Cons    Impression    Redemonstration of a 5.5 cm irregularly enhancing intra-axial mass at the right temporal occipital junction as evidence for high-grade neoplasm with stable 1 cm leftward midline shift for surgical planning. 07/04/21 MRI Brain w wo Cons    Impression       1. The patient has undergone interval removal of the large mass in the right posterior temporal lobe. 2. There is a residual 3.9 x 2.1 x 1.7 cm cavity at the surgical site with increased T1 weighted signal intensity suggestive of hemorrhage. 3. There is moderate surrounding edema. 4. There is significantly less mass effect and midline deviation than on previous study. 5. There are probable ischemic changes in the white matter with no evidence of an acute infarct. 6. There are postoperative changes in the right orbit.    7. Otherwise negative MRI scan of the brain with and without intravenous contrast.     05/09/2022 InPt Consult [facility]    INTERVAL HISTORY:  Ira Wood was initiated on Enhertu under the care of Dr. Chinedu Estrada.  She had favorable response to systemic therapy, And in October 2021 MRI scan of the brain indicated stability of the resection cavity. Ira Wood was brought to the emergency department 5/6/2022 with complaints of increasing headache, mood/personality changes and nausea/vomiting. MRI scan shows a large cystic enhancing mass corresponding to the previous operative bed. There is also an area of abnormal enhancement along the adjacent meninges very worrisome for meningeal involvement. Ira Wood has been seen for neurosurgical reconsultation (Dr. Crescencio Drew). In accordance with clinical practice guidelines she received a recommendation for repeat resection followed by radiation therapy. She is being seen today 5/9/2022 for reevaluation and discussion regarding the potential role of radiation therapy in the management of her metastatic breast cancer. 05/11/2022 Neurosurgery, Pathology    FINAL DIAGNOSIS:   A-C. Brain, right, lesion, excision:     Metastatic carcinoma consistent with breast primary. Specimen:   A) BRAIN BIOPSY, RIGHT LESION  FS   B) BRAIN BIOPSY, RIGHT LESION   C) BRAIN BIOPSY, RIGHT LESION     05/12/2022 MRI Brain W WO Cons    Impression       1. Postoperative changes involving the right parietal and posterior temporal calvarium. 2. Thickening at the surgical site and in the right lateral ventricle. 3. 3.7 x 1.3 cm area of abnormal signal intensity in the right posterior temporal lobe consistent with postoperative changes and hemorrhage at the surgical site. 4. There is edema in the right temporal and parietal regions. 4. There is mild atrophy and dilatation of the lateral ventricles. 5.. Probable ischemic changes in the white matter. 6. There is a small extra-axial lesion over the left posterior frontal lobe possibly representing an incidental meningioma. 06/03/2022 MRI Brain W WO Cons    Impression       1. Postoperative changes in the right posterior temporal/parietal region.    2. Large cavitary lesion at the surgical site measuring 4.3 x 3.5 x 2.8 cm in size significantly larger than on previous study dated 12/8/2022. Extra-axial fluid collection at the surgical site measuring 4.9 x 0.8 cm in size. 3. Abnormal enhancement along the anterior aspect of the cavity and extensive surrounding edema suggestive of tumor. There is mass effect upon the right lateral ventricle. 4. Mild atrophy and dilatation of the third and lateral ventricles. 5. Increased signal intensity in the white matter which may represent ischemic changes or possibly changes secondary to radiation therapy. 6. Small incidental meningioma over the left frontal lobe laterally. 7. Inflammatory changes in the mastoid air cells bilaterally. Coni Daley 06/06/2022 As per Neurosurgery follow up    Assessment/Plan:  Status Post craniotomy and reexploration for resection of recurrent intercranial lesion performed by Dr. Nae Upton, without complication  Doing well overall  Encouraged gradual increase in physical and mental activity. Fall precaution and home safety education provided to patient. Follow-up: 2-week follow-up to ascertain continued progress towards healing of the incision and for more detailed review of the latest MRI imaging following input from the neurosurgeon. A follow-up appointment with oncology is processed along with a appointment to wound care for evaluation of the incision to rule out any dehiscence or complications    07/27/3128 As per wound care,  \" Carl Benito is a 80 y.o. female who presents today for evaluation of nonhealing surgical wound to head. Patient is s/p re-exploration craniotomy and resection of recurrent brain lesion per Dr. Dhara Lopez 5/11/22. At follow up visit 6/6 with PA for suture removal surgical wound dehiscence was noted to proximal portion of wound prompting referral to clinic today. Patient has been leaving area open to air, states that she has aid assist with shower weekly, has been washing with shampoo.   She notes difficulties with her short term Patient did not have any chest pain and EKG did not reveal signs of ischemia and troponin was not elevated. Fortunately, the patient's PCP was working a shift in our emergency department and was also able to evaluate the patient. PCP knows the patient well and her prior visits she has been hypotensive. The patient has been mentating appropriately and states that she feels better after the fluid bolus. Prior to the craniotomies, the patient was taken off her midodrine which I suspect is likely the cause of her hypotension. The patient's PCP and I discussed this and we decided to place the patient back on midodrine and give her dose to the emergency department. I discussed the risk of hypotension with the patient especially risk of syncope and fall. The patient understands these risks and through shared decision-making, the patient wishes to go home and restart the midodrine. PCP also agrees with this plan. The patient has a home nurse that can monitor the patient and her response to restarting the midodrine. \"    07/21/2022 As per wound care,  \"Patient examined and evaluated. All available lab work, radiology studies, and progress notes pertaining to [facility] reviewed prior to or during patient visit today.     -nonhealing surgical wound, surgical wound dehiscence- One small open area remains along incision line, no drainage noted. Patient states she prefers to leave open to air. Advised her to keep area clean and dry. I do recommend she clean With chlorhexidine gluconate 1-2 times weekly to help keep clean and decrease bacterial load on scalp. Avoid use of regular shampoos due to scents and moisturizers.       -No indications of infection to wounds today. Education provided on signs and symptoms of infection. Call clinic or seek emergency care should these occur.     -Encouraged patient and her daughter to discuss new concerns for pressure in her head with surgeon.   Seek emergency care with any new onset or worsening of symptoms. \"     Metastatic breast cancer ([facility] [address]   7/2/2021 Surgery    Craniotomy with resection of brain mass, performed by Dr. Yu Tai (NeuroSx)    FINAL DIAGNOSIS:   A-B. Right parietal lobe, masses 1 and 2, biopsies:    Metastatic carcinoma consistent with breast primary. See microscopic description. 7/6/2021 Initial Diagnosis    Breast cancer metastasized to brain, right [facility])      Chemotherapy    Systemic therapy administered under the care of Dr. Raya Phillips ([address])     5/11/2022 Surgery    Neurosurgery performed under the care of Dr. Yu Tai (Neurosurgery)    Clinical Information: BRAIN MASS     FINAL DIAGNOSIS:   A-C. Brain, right, lesion, excision:     Metastatic carcinoma consistent with breast primary. Specimen:   A) BRAIN BIOPSY, RIGHT LESION  FS   B) BRAIN BIOPSY, RIGHT LESION   C) BRAIN BIOPSY, RIGHT LESION     Intraoperative Consultation:   A - Frozen Section DX:  Malignancy present. SIO    Received:  10:27 Reported:  10:48     Gross Examination:   A - The container is labeled Latanya Rod, right brain lesion. Received fresh for frozen section analysis are multiple fragments of   red-white tissue aggregating to 1.7 x 1.2 x 0.3 cm. A crush prep is   made and the remaining specimen is entirely submitted for frozen   section analysis. Frozen section remnants are entirely submitted. 1   ss. B - The container is labeled Latanya Rod, right brain lesion. Received in formalin are fragments of pink-white tissue aggregating to   3 x 2.5 x 2 cm. Representative sections are submitted in three   cassettes. ss.     C - The container is labeled Latanya Rod, right brain lesion. Received in formalin are multiple fragments of red-white tissue   aggregating to 2.2 x 1.7 x 0.4 cm.  1 ns. SIO/DKR:[id number]     Microscopic Examination:   A-C. The specimens demonstrate nests of malignant cells with   associated necrosis.   The malignant cells demonstrate nuclear   pleomorphism, prominent nucleoli, and moderate vacuolated cytoplasm. Mitotic figures are readily identifiable. The patient's history of   metastatic poorly differentiated breast carcinoma is noted. Immunohistochemistry for GATA3 is performed on block B2 with adequate   controls. The malignant cells are positive for GATA3. The morphology   and immunohistochemistry are consistent with metastatic breast   carcinoma. Clinical and radiologic correlation is recommended. INTERVAL HISTORY:  Desmond Bowles is a 80 y.o. female is presenting today for regularly scheduled follow up regarding the above mentioned oncologic history. Review of Systems   Constitutional:  Negative for activity change, appetite change, fatigue and unexpected weight change. HENT:  Negative for ear pain, facial swelling, hearing loss, sore throat, tinnitus and trouble swallowing. 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 Respiratory:  Negative for cough and shortness of breath. Cardiovascular:  Negative for chest pain. Gastrointestinal:  Negative for abdominal pain, blood in stool, nausea and rectal pain. Genitourinary:  Negative for difficulty urinating, frequency, hematuria and urgency. Musculoskeletal:  Negative for arthralgias and myalgias. Neurological:  Positive for dizziness, weakness, light-headedness and headaches. Negative for tremors, seizures, syncope, facial asymmetry, speech difficulty and numbness. Psychiatric/Behavioral:  Negative for confusion. A complete review of systems was performed and found to be negative except as presented above. CHAPERONE: Declined    PAIN: 0/10    LABORATORY STUDIES:  Onc labs:   Lab Results   Component Value Date/Time     01/21/2022 03:38 PM       MEDICATIONS:   Current Outpatient Medications   Medication Sig Dispense Refill    amiodarone (CORDARONE) 200 MG tablet Take 1 tablet by mouth in the morning. 30 tablet 0    midodrine (PROAMATINE) 2.5 MG tablet Take 1 tablet by mouth 3 times daily 90 tablet 3    sertraline (ZOLOFT) 50 MG tablet take 1 tablet by mouth twice a day 180 tablet 1    Calcium Carbonate-Vitamin D (OYSTER SHELL CALCIUM/D) 500-200 MG-UNIT TABS take 1 tablet by mouth twice a day 180 tablet 3    RA BIOTIN 1000 MCG TABS Take 1,000 mcg by mouth 2 times daily 60 tablet 5    vitamin D (ERGOCALCIFEROL) 1.25 MG (32055 UT) CAPS capsule take 1 capsule by mouth every week 5 capsule 5    metoprolol tartrate (LOPRESSOR) 25 MG tablet Take 0.5 tablets by mouth 2 times daily 60 tablet 3    levothyroxine (SYNTHROID) 100 MCG tablet take 1 tablet by mouth once daily 90 tablet 0    ALPRAZolam (XANAX) 0.25 MG tablet take 1 tablet by mouth twice a day if needed for anxiety 60 tablet 2    isosorbide mononitrate (IMDUR) 30 MG extended release tablet take 1 tablet by mouth once daily 90 tablet 1    simvastatin (ZOCOR) 20 MG tablet take 1 tablet by mouth every evening 90 tablet 1    docusate (COLACE, DULCOLAX) 100 MG CAPS Take 100 mg by mouth 2 times daily 60 capsule 1    Nebulizers (COMPRESSOR/NEBULIZER) MISC 1 Device by Does not apply route 4 times daily as needed (Shortness of breath and wheezing) 1 each 3    albuterol (PROVENTIL) (2.5 MG/3ML) 0.083% nebulizer solution Take 3 mLs by nebulization every 6 hours as needed for Wheezing 120 each 3    polyethylene glycol (GLYCOLAX) 17 GM/SCOOP powder Take 17 g by mouth daily as needed (constipation) 510 g 5    pantoprazole (PROTONIX) 40 MG tablet take 1 tablet by mouth once daily 90 tablet 1    zinc sulfate (ZINCATE) 220 (50 Zn) MG capsule Take 1 capsule by mouth daily 30 capsule 0    albuterol sulfate  (90 Base) MCG/ACT inhaler Inhale 2 puffs into the lungs every 6 hours as needed for Wheezing or Shortness of Breath 18 g 3    predniSONE (DELTASONE) 1 MG tablet Take 1 tablet by mouth daily - resume after completes decadron 60 tablet 2    levETIRAcetam (KEPPRA) 500 MG tablet take 1 tablet by mouth twice a day 240 tablet 2    loratadine (CLARITIN) 10 MG tablet take 1 tablet by mouth once daily 90 tablet 1    REFRESH PLUS 0.5 % SOLN ophthalmic solution use as directed      ondansetron (ZOFRAN-ODT) 4 MG disintegrating tablet Take 4 mg by mouth every 8 hours as needed      traZODone (DESYREL) 50 MG tablet take 1 tablet by mouth at bedtime if needed for sleep (Patient not taking: Reported on 7/26/2022) 30 tablet 5    melatonin 3 MG TABS tablet Take 1 tablet by mouth nightly as needed (insomnia) 30 tablet 3    Nutritional Supplements (ENSURE ORIGINAL) LIQD Take 1 Can by mouth 2 times daily 60 Bottle 5    Multiple Vitamins-Minerals (CENTRUM SILVER ADULT 50+) TABS Take 1 tablet by mouth daily 90 tablet 1     No current facility-administered medications for this encounter. PHYSICAL EXAMINATION:  VITAL SIGNS: BP (!) 168/70   Pulse 54   Temp 98 °F (36.7 °C) (Infrared)   Resp 16   Wt 137 lb (62.1 kg)   LMP  (LMP Unknown) Comment: age 52 ovaries intact  SpO2 93%   BMI 25.06 kg/m²     ECOG: 3 - Symptomatic, >50% in bed, but not bedbound (Capable of only limited self-care, confined to bed or chair 50% or more of waking hours)    Physical Exam  Constitutional:       General: She is not in acute distress. Appearance: Normal appearance. HENT:      Head: Normocephalic and atraumatic. Pulmonary:      Effort: Pulmonary effort is normal. No respiratory distress. Abdominal:      General: Abdomen is flat. There is no distension. Palpations: Abdomen is soft. Musculoskeletal:      Right lower leg: No edema. Left lower leg: No edema. Skin:     Comments: Surgical site healing well, without dehiscence or significant erythema. Mildly tender to palpation. Neurological:      Mental Status: She is alert and oriented to person, place, and time. Sensory: No sensory deficit. Motor: No weakness. Comments: Cranial nerves V, VII, and XI intact.  Vision and EOMs unable to be evaluated due to patient's baseline visual deficits. Electronically signed by Kim Snider MD MS on 7/26/22 at 9:23 AM EDT     ATTESTATION: 20 minutes were spent with the patient at today's visit reviewing pertinent information related to their oncologic diagnosis, including any recent labs, imaging, follow ups and plan of care going forward. CC: Dr. Jersey Siddiqui (NeuroSx) ;  Amaya Vazquez  (Med/Onc)  ACC:[facility]

## 2022-07-27 ENCOUNTER — APPOINTMENT (OUTPATIENT)
Dept: CT IMAGING | Age: 87
DRG: 054 | End: 2022-07-27
Payer: MEDICARE

## 2022-07-27 ENCOUNTER — APPOINTMENT (OUTPATIENT)
Dept: GENERAL RADIOLOGY | Age: 87
DRG: 054 | End: 2022-07-27
Payer: MEDICARE

## 2022-07-27 ENCOUNTER — HOSPITAL ENCOUNTER (INPATIENT)
Age: 87
LOS: 5 days | Discharge: HOME | DRG: 054 | End: 2022-08-01
Attending: EMERGENCY MEDICINE | Admitting: INTERNAL MEDICINE
Payer: MEDICARE

## 2022-07-27 DIAGNOSIS — R53.1 GENERALIZED WEAKNESS: ICD-10-CM

## 2022-07-27 DIAGNOSIS — C79.31 BREAST CANCER METASTASIZED TO BRAIN, UNSPECIFIED LATERALITY (HCC): Primary | ICD-10-CM

## 2022-07-27 DIAGNOSIS — C50.919 BREAST CANCER METASTASIZED TO BRAIN, UNSPECIFIED LATERALITY (HCC): Primary | ICD-10-CM

## 2022-07-27 DIAGNOSIS — G93.89 BRAIN MASS: ICD-10-CM

## 2022-07-27 LAB
ANION GAP SERPL CALCULATED.3IONS-SCNC: 8 MEQ/L (ref 8–16)
BACTERIA: ABNORMAL /HPF
BASOPHILS # BLD: 0.1 %
BASOPHILS ABSOLUTE: 0 THOU/MM3 (ref 0–0.1)
BILIRUBIN URINE: NEGATIVE
BLOOD, URINE: NEGATIVE
BUN BLDV-MCNC: 24 MG/DL (ref 7–22)
CALCIUM SERPL-MCNC: 9.2 MG/DL (ref 8.5–10.5)
CASTS 2: ABNORMAL /LPF
CASTS UA: ABNORMAL /LPF
CHARACTER, URINE: CLEAR
CHLORIDE BLD-SCNC: 105 MEQ/L (ref 98–111)
CO2: 29 MEQ/L (ref 23–33)
COLOR: YELLOW
CREAT SERPL-MCNC: 0.8 MG/DL (ref 0.4–1.2)
CRYSTALS, UA: ABNORMAL
EOSINOPHIL # BLD: 1.7 %
EOSINOPHILS ABSOLUTE: 0.1 THOU/MM3 (ref 0–0.4)
EPITHELIAL CELLS, UA: ABNORMAL /HPF
ERYTHROCYTE [DISTWIDTH] IN BLOOD BY AUTOMATED COUNT: 15.6 % (ref 11.5–14.5)
ERYTHROCYTE [DISTWIDTH] IN BLOOD BY AUTOMATED COUNT: 54.9 FL (ref 35–45)
GFR SERPL CREATININE-BSD FRML MDRD: 68 ML/MIN/1.73M2
GLUCOSE BLD-MCNC: 98 MG/DL (ref 70–108)
GLUCOSE URINE: NEGATIVE MG/DL
HCT VFR BLD CALC: 38.6 % (ref 37–47)
HEMOGLOBIN: 11.4 GM/DL (ref 12–16)
IMMATURE GRANS (ABS): 0.03 THOU/MM3 (ref 0–0.07)
IMMATURE GRANULOCYTES: 0.4 %
KETONES, URINE: NEGATIVE
LEUKOCYTE ESTERASE, URINE: ABNORMAL
LYMPHOCYTES # BLD: 23 %
LYMPHOCYTES ABSOLUTE: 1.7 THOU/MM3 (ref 1–4.8)
MCH RBC QN AUTO: 28.1 PG (ref 26–33)
MCHC RBC AUTO-ENTMCNC: 29.5 GM/DL (ref 32.2–35.5)
MCV RBC AUTO: 95.3 FL (ref 81–99)
MISCELLANEOUS 2: ABNORMAL
MONOCYTES # BLD: 7.1 %
MONOCYTES ABSOLUTE: 0.5 THOU/MM3 (ref 0.4–1.3)
NITRITE, URINE: NEGATIVE
NUCLEATED RED BLOOD CELLS: 0 /100 WBC
OSMOLALITY CALCULATION: 287.1 MOSMOL/KG (ref 275–300)
PH UA: 7.5 (ref 5–9)
PLATELET # BLD: 154 THOU/MM3 (ref 130–400)
PMV BLD AUTO: 12.1 FL (ref 9.4–12.4)
POTASSIUM REFLEX MAGNESIUM: 4.8 MEQ/L (ref 3.5–5.2)
PROTEIN UA: NEGATIVE
RBC # BLD: 4.05 MILL/MM3 (ref 4.2–5.4)
RBC URINE: ABNORMAL /HPF
RENAL EPITHELIAL, UA: ABNORMAL
SARS-COV-2, NAAT: NOT  DETECTED
SEG NEUTROPHILS: 67.7 %
SEGMENTED NEUTROPHILS ABSOLUTE COUNT: 5.1 THOU/MM3 (ref 1.8–7.7)
SODIUM BLD-SCNC: 142 MEQ/L (ref 135–145)
SPECIFIC GRAVITY, URINE: 1.02 (ref 1–1.03)
TROPONIN T: < 0.01 NG/ML
UROBILINOGEN, URINE: 0.2 EU/DL (ref 0–1)
WBC # BLD: 7.5 THOU/MM3 (ref 4.8–10.8)
WBC UA: ABNORMAL /HPF
YEAST: ABNORMAL

## 2022-07-27 PROCEDURE — 96374 THER/PROPH/DIAG INJ IV PUSH: CPT

## 2022-07-27 PROCEDURE — 2140000000 HC CCU INTERMEDIATE R&B

## 2022-07-27 PROCEDURE — 80048 BASIC METABOLIC PNL TOTAL CA: CPT

## 2022-07-27 PROCEDURE — 84484 ASSAY OF TROPONIN QUANT: CPT

## 2022-07-27 PROCEDURE — 99285 EMERGENCY DEPT VISIT HI MDM: CPT

## 2022-07-27 PROCEDURE — 85025 COMPLETE CBC W/AUTO DIFF WBC: CPT

## 2022-07-27 PROCEDURE — 81001 URINALYSIS AUTO W/SCOPE: CPT

## 2022-07-27 PROCEDURE — 71045 X-RAY EXAM CHEST 1 VIEW: CPT

## 2022-07-27 PROCEDURE — 96361 HYDRATE IV INFUSION ADD-ON: CPT

## 2022-07-27 PROCEDURE — 6360000002 HC RX W HCPCS: Performed by: EMERGENCY MEDICINE

## 2022-07-27 PROCEDURE — 93005 ELECTROCARDIOGRAM TRACING: CPT | Performed by: EMERGENCY MEDICINE

## 2022-07-27 PROCEDURE — 36415 COLL VENOUS BLD VENIPUNCTURE: CPT

## 2022-07-27 PROCEDURE — 87635 SARS-COV-2 COVID-19 AMP PRB: CPT

## 2022-07-27 PROCEDURE — 70450 CT HEAD/BRAIN W/O DYE: CPT

## 2022-07-27 PROCEDURE — 2580000003 HC RX 258: Performed by: EMERGENCY MEDICINE

## 2022-07-27 RX ORDER — DEXAMETHASONE SODIUM PHOSPHATE 4 MG/ML
10 INJECTION, SOLUTION INTRA-ARTICULAR; INTRALESIONAL; INTRAMUSCULAR; INTRAVENOUS; SOFT TISSUE ONCE
Status: COMPLETED | OUTPATIENT
Start: 2022-07-27 | End: 2022-07-27

## 2022-07-27 RX ORDER — 0.9 % SODIUM CHLORIDE 0.9 %
1000 INTRAVENOUS SOLUTION INTRAVENOUS ONCE
Status: COMPLETED | OUTPATIENT
Start: 2022-07-27 | End: 2022-07-27

## 2022-07-27 RX ADMIN — SODIUM CHLORIDE 1000 ML: 9 INJECTION, SOLUTION INTRAVENOUS at 20:51

## 2022-07-27 RX ADMIN — DEXAMETHASONE SODIUM PHOSPHATE 10 MG: 4 INJECTION, SOLUTION INTRAMUSCULAR; INTRAVENOUS at 22:36

## 2022-07-27 ASSESSMENT — PAIN - FUNCTIONAL ASSESSMENT
PAIN_FUNCTIONAL_ASSESSMENT: NONE - DENIES PAIN

## 2022-07-27 NOTE — ED TRIAGE NOTES
Pt come to ED by EMS from home. Pts family reported to EMS that the pt has been increasingly fatigued. Pt states that she has noticed being more fatigued. Pt states no other complaints or symptoms at this time. VSS. Pt resting on cot.

## 2022-07-28 LAB
ALBUMIN SERPL-MCNC: 3.5 G/DL (ref 3.5–5.1)
ALP BLD-CCNC: 44 U/L (ref 38–126)
ALT SERPL-CCNC: 9 U/L (ref 11–66)
ANION GAP SERPL CALCULATED.3IONS-SCNC: 12 MEQ/L (ref 8–16)
AST SERPL-CCNC: 18 U/L (ref 5–40)
BASOPHILS # BLD: 0 %
BASOPHILS ABSOLUTE: 0 THOU/MM3 (ref 0–0.1)
BILIRUB SERPL-MCNC: 0.3 MG/DL (ref 0.3–1.2)
BUN BLDV-MCNC: 19 MG/DL (ref 7–22)
CALCIUM SERPL-MCNC: 9.1 MG/DL (ref 8.5–10.5)
CHLORIDE BLD-SCNC: 106 MEQ/L (ref 98–111)
CO2: 24 MEQ/L (ref 23–33)
CREAT SERPL-MCNC: 0.7 MG/DL (ref 0.4–1.2)
EKG ATRIAL RATE: 53 BPM
EKG P-R INTERVAL: 146 MS
EKG Q-T INTERVAL: 480 MS
EKG QRS DURATION: 80 MS
EKG QTC CALCULATION (BAZETT): 450 MS
EKG R AXIS: 2 DEGREES
EKG T AXIS: 159 DEGREES
EKG VENTRICULAR RATE: 53 BPM
EOSINOPHIL # BLD: 0.3 %
EOSINOPHILS ABSOLUTE: 0 THOU/MM3 (ref 0–0.4)
ERYTHROCYTE [DISTWIDTH] IN BLOOD BY AUTOMATED COUNT: 15.6 % (ref 11.5–14.5)
ERYTHROCYTE [DISTWIDTH] IN BLOOD BY AUTOMATED COUNT: 54.3 FL (ref 35–45)
GFR SERPL CREATININE-BSD FRML MDRD: 79 ML/MIN/1.73M2
GLUCOSE BLD-MCNC: 120 MG/DL (ref 70–108)
GLUCOSE BLD-MCNC: 136 MG/DL (ref 70–108)
GLUCOSE BLD-MCNC: 152 MG/DL (ref 70–108)
GLUCOSE BLD-MCNC: 217 MG/DL (ref 70–108)
HCT VFR BLD CALC: 38.2 % (ref 37–47)
HEMOGLOBIN: 11.5 GM/DL (ref 12–16)
IMMATURE GRANS (ABS): 0.05 THOU/MM3 (ref 0–0.07)
IMMATURE GRANULOCYTES: 0.7 %
LYMPHOCYTES # BLD: 11.4 %
LYMPHOCYTES ABSOLUTE: 0.9 THOU/MM3 (ref 1–4.8)
MCH RBC QN AUTO: 28.4 PG (ref 26–33)
MCHC RBC AUTO-ENTMCNC: 30.1 GM/DL (ref 32.2–35.5)
MCV RBC AUTO: 94.3 FL (ref 81–99)
MONOCYTES # BLD: 1 %
MONOCYTES ABSOLUTE: 0.1 THOU/MM3 (ref 0.4–1.3)
NUCLEATED RED BLOOD CELLS: 0 /100 WBC
PLATELET # BLD: 140 THOU/MM3 (ref 130–400)
PLATELET ESTIMATE: ADEQUATE
PMV BLD AUTO: 12.4 FL (ref 9.4–12.4)
POTASSIUM REFLEX MAGNESIUM: 4.5 MEQ/L (ref 3.5–5.2)
RBC # BLD: 4.05 MILL/MM3 (ref 4.2–5.4)
SCAN OF BLOOD SMEAR: NORMAL
SEG NEUTROPHILS: 86.6 %
SEGMENTED NEUTROPHILS ABSOLUTE COUNT: 6.7 THOU/MM3 (ref 1.8–7.7)
SODIUM BLD-SCNC: 142 MEQ/L (ref 135–145)
TOTAL PROTEIN: 6 G/DL (ref 6.1–8)
WBC # BLD: 7.7 THOU/MM3 (ref 4.8–10.8)

## 2022-07-28 PROCEDURE — 85025 COMPLETE CBC W/AUTO DIFF WBC: CPT

## 2022-07-28 PROCEDURE — 97535 SELF CARE MNGMENT TRAINING: CPT

## 2022-07-28 PROCEDURE — 6360000002 HC RX W HCPCS: Performed by: NURSE PRACTITIONER

## 2022-07-28 PROCEDURE — 97166 OT EVAL MOD COMPLEX 45 MIN: CPT

## 2022-07-28 PROCEDURE — 2140000000 HC CCU INTERMEDIATE R&B

## 2022-07-28 PROCEDURE — 99221 1ST HOSP IP/OBS SF/LOW 40: CPT | Performed by: RADIOLOGY

## 2022-07-28 PROCEDURE — 93010 ELECTROCARDIOGRAM REPORT: CPT | Performed by: INTERNAL MEDICINE

## 2022-07-28 PROCEDURE — 36415 COLL VENOUS BLD VENIPUNCTURE: CPT

## 2022-07-28 PROCEDURE — 6370000000 HC RX 637 (ALT 250 FOR IP): Performed by: INTERNAL MEDICINE

## 2022-07-28 PROCEDURE — 99233 SBSQ HOSP IP/OBS HIGH 50: CPT | Performed by: NEUROLOGICAL SURGERY

## 2022-07-28 PROCEDURE — 2580000003 HC RX 258: Performed by: INTERNAL MEDICINE

## 2022-07-28 PROCEDURE — 82948 REAGENT STRIP/BLOOD GLUCOSE: CPT

## 2022-07-28 PROCEDURE — 97530 THERAPEUTIC ACTIVITIES: CPT

## 2022-07-28 PROCEDURE — 80053 COMPREHEN METABOLIC PANEL: CPT

## 2022-07-28 PROCEDURE — 6360000002 HC RX W HCPCS: Performed by: INTERNAL MEDICINE

## 2022-07-28 RX ORDER — PANTOPRAZOLE SODIUM 40 MG/1
40 TABLET, DELAYED RELEASE ORAL
Status: DISCONTINUED | OUTPATIENT
Start: 2022-07-28 | End: 2022-08-01 | Stop reason: HOSPADM

## 2022-07-28 RX ORDER — ONDANSETRON 4 MG/1
4 TABLET, ORALLY DISINTEGRATING ORAL EVERY 8 HOURS PRN
Status: DISCONTINUED | OUTPATIENT
Start: 2022-07-28 | End: 2022-08-01 | Stop reason: HOSPADM

## 2022-07-28 RX ORDER — SODIUM CHLORIDE 9 MG/ML
INJECTION, SOLUTION INTRAVENOUS PRN
Status: DISCONTINUED | OUTPATIENT
Start: 2022-07-28 | End: 2022-08-01 | Stop reason: HOSPADM

## 2022-07-28 RX ORDER — ACETAMINOPHEN 325 MG/1
650 TABLET ORAL EVERY 6 HOURS PRN
Status: DISCONTINUED | OUTPATIENT
Start: 2022-07-28 | End: 2022-08-01 | Stop reason: HOSPADM

## 2022-07-28 RX ORDER — ALPRAZOLAM 0.25 MG/1
0.25 TABLET ORAL 2 TIMES DAILY PRN
Status: DISCONTINUED | OUTPATIENT
Start: 2022-07-28 | End: 2022-08-01 | Stop reason: HOSPADM

## 2022-07-28 RX ORDER — ISOSORBIDE MONONITRATE 30 MG/1
30 TABLET, EXTENDED RELEASE ORAL DAILY
Status: DISCONTINUED | OUTPATIENT
Start: 2022-07-28 | End: 2022-08-01 | Stop reason: HOSPADM

## 2022-07-28 RX ORDER — TRAZODONE HYDROCHLORIDE 50 MG/1
50 TABLET ORAL NIGHTLY PRN
Status: DISCONTINUED | OUTPATIENT
Start: 2022-07-28 | End: 2022-08-01 | Stop reason: HOSPADM

## 2022-07-28 RX ORDER — SODIUM CHLORIDE 0.9 % (FLUSH) 0.9 %
5-40 SYRINGE (ML) INJECTION PRN
Status: DISCONTINUED | OUTPATIENT
Start: 2022-07-28 | End: 2022-08-01 | Stop reason: HOSPADM

## 2022-07-28 RX ORDER — LEVOTHYROXINE SODIUM 0.1 MG/1
100 TABLET ORAL DAILY
Status: DISCONTINUED | OUTPATIENT
Start: 2022-07-28 | End: 2022-08-01 | Stop reason: HOSPADM

## 2022-07-28 RX ORDER — INSULIN LISPRO 100 [IU]/ML
0-4 INJECTION, SOLUTION INTRAVENOUS; SUBCUTANEOUS NIGHTLY
Status: DISCONTINUED | OUTPATIENT
Start: 2022-07-28 | End: 2022-08-01 | Stop reason: HOSPADM

## 2022-07-28 RX ORDER — DEXTROSE MONOHYDRATE 100 MG/ML
INJECTION, SOLUTION INTRAVENOUS CONTINUOUS PRN
Status: DISCONTINUED | OUTPATIENT
Start: 2022-07-28 | End: 2022-08-01 | Stop reason: HOSPADM

## 2022-07-28 RX ORDER — BIOTIN 1 MG
1000 TABLET ORAL 2 TIMES DAILY
Status: DISCONTINUED | OUTPATIENT
Start: 2022-07-28 | End: 2022-07-28 | Stop reason: RX

## 2022-07-28 RX ORDER — LANOLIN ALCOHOL/MO/W.PET/CERES
3 CREAM (GRAM) TOPICAL NIGHTLY PRN
Status: DISCONTINUED | OUTPATIENT
Start: 2022-07-28 | End: 2022-08-01 | Stop reason: HOSPADM

## 2022-07-28 RX ORDER — AMIODARONE HYDROCHLORIDE 200 MG/1
200 TABLET ORAL DAILY
Status: DISCONTINUED | OUTPATIENT
Start: 2022-07-28 | End: 2022-08-01 | Stop reason: HOSPADM

## 2022-07-28 RX ORDER — INSULIN LISPRO 100 [IU]/ML
0-4 INJECTION, SOLUTION INTRAVENOUS; SUBCUTANEOUS
Status: DISCONTINUED | OUTPATIENT
Start: 2022-07-28 | End: 2022-08-01 | Stop reason: HOSPADM

## 2022-07-28 RX ORDER — DOCUSATE SODIUM 100 MG/1
100 CAPSULE, LIQUID FILLED ORAL 2 TIMES DAILY
Status: DISCONTINUED | OUTPATIENT
Start: 2022-07-28 | End: 2022-08-01 | Stop reason: HOSPADM

## 2022-07-28 RX ORDER — ERGOCALCIFEROL 1.25 MG/1
50000 CAPSULE ORAL WEEKLY
Status: DISCONTINUED | OUTPATIENT
Start: 2022-08-02 | End: 2022-08-01 | Stop reason: HOSPADM

## 2022-07-28 RX ORDER — MULTIVITAMIN WITH IRON
1 TABLET ORAL DAILY
Status: DISCONTINUED | OUTPATIENT
Start: 2022-07-28 | End: 2022-08-01 | Stop reason: HOSPADM

## 2022-07-28 RX ORDER — DEXAMETHASONE SODIUM PHOSPHATE 4 MG/ML
4 INJECTION, SOLUTION INTRA-ARTICULAR; INTRALESIONAL; INTRAMUSCULAR; INTRAVENOUS; SOFT TISSUE EVERY 6 HOURS
Status: DISCONTINUED | OUTPATIENT
Start: 2022-07-28 | End: 2022-08-01 | Stop reason: HOSPADM

## 2022-07-28 RX ORDER — LEVETIRACETAM 500 MG/1
500 TABLET ORAL 2 TIMES DAILY
Status: DISCONTINUED | OUTPATIENT
Start: 2022-07-28 | End: 2022-08-01 | Stop reason: HOSPADM

## 2022-07-28 RX ORDER — ALBUTEROL SULFATE 2.5 MG/3ML
2.5 SOLUTION RESPIRATORY (INHALATION) EVERY 6 HOURS PRN
Status: DISCONTINUED | OUTPATIENT
Start: 2022-07-28 | End: 2022-08-01 | Stop reason: HOSPADM

## 2022-07-28 RX ORDER — ONDANSETRON 2 MG/ML
4 INJECTION INTRAMUSCULAR; INTRAVENOUS EVERY 6 HOURS PRN
Status: DISCONTINUED | OUTPATIENT
Start: 2022-07-28 | End: 2022-08-01 | Stop reason: HOSPADM

## 2022-07-28 RX ORDER — DEXAMETHASONE SODIUM PHOSPHATE 4 MG/ML
4 INJECTION, SOLUTION INTRA-ARTICULAR; INTRALESIONAL; INTRAMUSCULAR; INTRAVENOUS; SOFT TISSUE EVERY 6 HOURS
Status: DISCONTINUED | OUTPATIENT
Start: 2022-07-28 | End: 2022-07-28

## 2022-07-28 RX ORDER — MIDODRINE HYDROCHLORIDE 2.5 MG/1
2.5 TABLET ORAL
Status: DISCONTINUED | OUTPATIENT
Start: 2022-07-28 | End: 2022-08-01 | Stop reason: HOSPADM

## 2022-07-28 RX ORDER — SODIUM CHLORIDE 0.9 % (FLUSH) 0.9 %
5-40 SYRINGE (ML) INJECTION EVERY 12 HOURS SCHEDULED
Status: DISCONTINUED | OUTPATIENT
Start: 2022-07-28 | End: 2022-08-01 | Stop reason: HOSPADM

## 2022-07-28 RX ORDER — ZINC SULFATE 50(220)MG
50 CAPSULE ORAL DAILY
Status: DISCONTINUED | OUTPATIENT
Start: 2022-07-28 | End: 2022-08-01 | Stop reason: HOSPADM

## 2022-07-28 RX ORDER — CETIRIZINE HYDROCHLORIDE 10 MG/1
5 TABLET ORAL DAILY
Status: DISCONTINUED | OUTPATIENT
Start: 2022-07-28 | End: 2022-08-01 | Stop reason: HOSPADM

## 2022-07-28 RX ORDER — POLYETHYLENE GLYCOL 3350 17 G/17G
17 POWDER, FOR SOLUTION ORAL DAILY PRN
Status: DISCONTINUED | OUTPATIENT
Start: 2022-07-28 | End: 2022-08-01 | Stop reason: HOSPADM

## 2022-07-28 RX ORDER — ACETAMINOPHEN 650 MG/1
650 SUPPOSITORY RECTAL EVERY 6 HOURS PRN
Status: DISCONTINUED | OUTPATIENT
Start: 2022-07-28 | End: 2022-08-01 | Stop reason: HOSPADM

## 2022-07-28 RX ORDER — ATORVASTATIN CALCIUM 10 MG/1
10 TABLET, FILM COATED ORAL NIGHTLY
Status: DISCONTINUED | OUTPATIENT
Start: 2022-07-28 | End: 2022-08-01 | Stop reason: HOSPADM

## 2022-07-28 RX ORDER — SODIUM CHLORIDE 9 MG/ML
INJECTION, SOLUTION INTRAVENOUS CONTINUOUS
Status: DISCONTINUED | OUTPATIENT
Start: 2022-07-28 | End: 2022-08-01 | Stop reason: HOSPADM

## 2022-07-28 RX ADMIN — SERTRALINE 50 MG: 50 TABLET, FILM COATED ORAL at 20:28

## 2022-07-28 RX ADMIN — METOPROLOL TARTRATE 12.5 MG: 25 TABLET, FILM COATED ORAL at 09:16

## 2022-07-28 RX ADMIN — MIDODRINE HYDROCHLORIDE 2.5 MG: 2.5 TABLET ORAL at 17:09

## 2022-07-28 RX ADMIN — SERTRALINE 50 MG: 50 TABLET, FILM COATED ORAL at 09:16

## 2022-07-28 RX ADMIN — LEVETIRACETAM 500 MG: 500 TABLET, FILM COATED ORAL at 01:38

## 2022-07-28 RX ADMIN — LEVETIRACETAM 500 MG: 500 TABLET, FILM COATED ORAL at 20:28

## 2022-07-28 RX ADMIN — LEVOTHYROXINE SODIUM 100 MCG: 0.1 TABLET ORAL at 05:20

## 2022-07-28 RX ADMIN — METOPROLOL TARTRATE 12.5 MG: 25 TABLET, FILM COATED ORAL at 20:28

## 2022-07-28 RX ADMIN — SODIUM CHLORIDE: 9 INJECTION, SOLUTION INTRAVENOUS at 01:41

## 2022-07-28 RX ADMIN — CETIRIZINE HYDROCHLORIDE 5 MG: 10 TABLET, FILM COATED ORAL at 09:15

## 2022-07-28 RX ADMIN — LEVETIRACETAM 500 MG: 500 TABLET, FILM COATED ORAL at 09:16

## 2022-07-28 RX ADMIN — Medication 1 TABLET: at 09:15

## 2022-07-28 RX ADMIN — DOCUSATE SODIUM 100 MG: 100 CAPSULE, LIQUID FILLED ORAL at 20:28

## 2022-07-28 RX ADMIN — DEXAMETHASONE SODIUM PHOSPHATE 4 MG: 4 INJECTION, SOLUTION INTRA-ARTICULAR; INTRALESIONAL; INTRAMUSCULAR; INTRAVENOUS; SOFT TISSUE at 18:21

## 2022-07-28 RX ADMIN — DEXAMETHASONE SODIUM PHOSPHATE 4 MG: 4 INJECTION, SOLUTION INTRA-ARTICULAR; INTRALESIONAL; INTRAMUSCULAR; INTRAVENOUS; SOFT TISSUE at 05:23

## 2022-07-28 RX ADMIN — MIDODRINE HYDROCHLORIDE 2.5 MG: 2.5 TABLET ORAL at 13:27

## 2022-07-28 RX ADMIN — INSULIN LISPRO 1 UNITS: 100 INJECTION, SOLUTION INTRAVENOUS; SUBCUTANEOUS at 13:24

## 2022-07-28 RX ADMIN — DEXAMETHASONE SODIUM PHOSPHATE 4 MG: 4 INJECTION, SOLUTION INTRA-ARTICULAR; INTRALESIONAL; INTRAMUSCULAR; INTRAVENOUS; SOFT TISSUE at 13:25

## 2022-07-28 RX ADMIN — Medication 50 MG: at 09:16

## 2022-07-28 RX ADMIN — DOCUSATE SODIUM 100 MG: 100 CAPSULE, LIQUID FILLED ORAL at 09:16

## 2022-07-28 RX ADMIN — ATORVASTATIN CALCIUM 10 MG: 10 TABLET, FILM COATED ORAL at 20:28

## 2022-07-28 RX ADMIN — ACETAMINOPHEN 325MG 650 MG: 325 TABLET ORAL at 13:31

## 2022-07-28 RX ADMIN — ATORVASTATIN CALCIUM 10 MG: 10 TABLET, FILM COATED ORAL at 01:38

## 2022-07-28 RX ADMIN — AMIODARONE HYDROCHLORIDE 200 MG: 200 TABLET ORAL at 09:17

## 2022-07-28 RX ADMIN — Medication 1 TABLET: at 20:28

## 2022-07-28 RX ADMIN — PANTOPRAZOLE SODIUM 40 MG: 40 TABLET, DELAYED RELEASE ORAL at 05:19

## 2022-07-28 RX ADMIN — TRAZODONE HYDROCHLORIDE 50 MG: 50 TABLET ORAL at 20:28

## 2022-07-28 RX ADMIN — MIDODRINE HYDROCHLORIDE 2.5 MG: 2.5 TABLET ORAL at 09:17

## 2022-07-28 RX ADMIN — ISOSORBIDE MONONITRATE 30 MG: 30 TABLET, EXTENDED RELEASE ORAL at 09:16

## 2022-07-28 ASSESSMENT — ENCOUNTER SYMPTOMS
VOMITING: 0
ABDOMINAL PAIN: 0
DIARRHEA: 0
ABDOMINAL DISTENTION: 0
CHEST TIGHTNESS: 0
COUGH: 0
BLOOD IN STOOL: 0
COLOR CHANGE: 0
NAUSEA: 0
CONSTIPATION: 0
APNEA: 0
BACK PAIN: 0
SHORTNESS OF BREATH: 0
WHEEZING: 1

## 2022-07-28 ASSESSMENT — PAIN DESCRIPTION - DESCRIPTORS: DESCRIPTORS: ACHING

## 2022-07-28 ASSESSMENT — PAIN - FUNCTIONAL ASSESSMENT: PAIN_FUNCTIONAL_ASSESSMENT: ACTIVITIES ARE NOT PREVENTED

## 2022-07-28 ASSESSMENT — PAIN SCALES - WONG BAKER: WONGBAKER_NUMERICALRESPONSE: 0

## 2022-07-28 ASSESSMENT — PAIN DESCRIPTION - LOCATION: LOCATION: HEAD;NECK

## 2022-07-28 ASSESSMENT — PAIN SCALES - GENERAL
PAINLEVEL_OUTOF10: 2
PAINLEVEL_OUTOF10: 0
PAINLEVEL_OUTOF10: 0

## 2022-07-28 NOTE — PROGRESS NOTES
Patient was seen and examined in floor. This is an 80-year-old female with history of metastatic breast cancer to the brain. She underwent two craniotomy in the past for resection of right parietal occipital craniotomy for resection of metastatic brain tumor (the last on was on May 11, 2022). She did well after her craniotomies. Patiently admitted recently because of history of general weakness and right-sided headache and a decline in her vision in left. During my assessment of the patient: She is following command by 4 strength throughout, she is almost blind in both eyes now. New labs have been reviewed. She underwent a brain CT that showed findings suggestive of recurrence of her brain metastatic tumor (brain MRI is ordered for further evaluation). Patient is supposed to go with hospice Per my discussion with her primary care provider and her family in the past however, it seems that the patient declined to go to the hospital at this time. I discussed the case with radiation oncology Dr. Patel Spear, who are both in agreement that the patient is not a good candidate for any further neurosurgical intervention at this time. Palliative care is recommended at this time and hospice options should be considered at some point. Follow-up radiation and medical oncology team recommendations. I discussed the case also with patient and her nurse and explained to her the recommendation treatment plan from neurosurgical perspective at this time. From this time on, neurosurgery team will see this patient only as needed as long as sheis in the hospital. Please call if you have any further questions or concerns regarding this patient. Follow-up with neurosurgery outpatient only as needed.         *I spend a total of 35 minutes with greater than 60% of time spent face to face, counseling, coordinating care, examining patient, reviewing images and labs personally, and speaking with team.

## 2022-07-28 NOTE — FLOWSHEET NOTE
07/28/22 1340   Encounter Summary   Encounter Overview/Reason  Initial Encounter   Service Provided For: Patient   Referral/Consult From: [facility];Family members   Last Encounter  07/28/22   Complexity of Encounter Low   Begin Time 1245   End Time  1256   Total Time Calculated 11 min   Encounter    Type Initial Screen/Assessment   Assessment/Intervention/Outcome   Assessment Coping   Intervention Active listening;Sustaining Presence/Ministry of presence   Outcome Acceptance      Notes from [facility]. Valeri Ghotra:  I met the patient for prayer. Grey Zurita is a woman of mary jane. She has her sisters and a mary jane community. She affirmed her mary jane in [place] and his love for her. God will never leave me nor forsake me, she said. A Mu-ism and some family members are in her support system. She blessed me for praying for her. I accepted that with gratitude. She is full of gracious words. Recommendation: Please continue to visit with her for prayer.

## 2022-07-28 NOTE — ED NOTES
Pt resting on cot with eyes closed. Does not appear to be in acute distress. VSS. Telemetry in place.       Edgar Archer RN  07/27/22 2037

## 2022-07-28 NOTE — CARE COORDINATION
7/28/22, 11:14 AM EDT  DISCHARGE PLANNING EVALUATION:    Desmond Bowles       Admitted: 7/27/2022/ [facility] day: 1   Location: [address] [place] [address]-A Reason for admit: Generalized weakness [R53.1]  Brain mass [G93.89]  Breast cancer metastasized to brain, unspecified laterality ([address] [C50.919, C79.31]   PMH:  has a past medical history of Anxiety, Blind right eye, Breast cancer ([address] Breast cancer metastasized to liver (5/16) and brain (7/21) ([address] Carotid stenosis, Colostomy in place [facility]), Degenerative arthritis of cervical spine, GERD (gastroesophageal reflux disease), History of craniotomy, Hx antineoplastic chemo, Hypercholesteremia, Hypoestrogenism, Hypothyroidism, Lumbago, Lumbosacral spinal stenosis, MDRO (multiple drug resistant organisms) resistance, Metastasis ([address] Personal history of cardiac dysrhythmia, Respiratory tract infection due to COVID-19 virus, Sigmoid diverticulosis, Spondylolisthesis of lumbosacral region, Steroid-induced hyperglycemia, Subclavian artery stenosis ([address] Superior and Inferior Pubic ramus fracture, right, closed, initial encounter ([address] SVT (supraventricular tachycardia) ([address] Temporal arteritis ([address] Vertebral artery occlusion, and Vitamin D deficiency. Procedure:   7/27 CT Head: 1. There are 2 new round and confluent areas of low attenuation in the right parietal lobe and right temporal lobe, as detailed above. Worsening confluent white matter changes are noted within portions of right cerebral hemisphere. Findings are compatible with worsening   neoplastic disease. New since the prior study is approximately 5-6 mm of right-to-left midline shift at the level of the septum pellucidum. No hydrocephalus or downward herniation. Postsurgical changes are again noted with decreasing postoperative fluid underlying the craniotomy site. 4. Neurosurgical consultation is advised. Barriers to Discharge:  Admitted through ED with fatigue and generalized weakness.  History of breast cancer with mets to the brain. CT head is showing new sites and worsening neoplastic process. Pt has been stating she wants to continue to treat. Palliative Care has been consulted. Consulting Neurosurgery, Oncology and Radiation Oncology. IVF. Decadron 4mg iv q6hr. PCP: Liza Briceño MD  Readmission Risk Score: 26.2%  Patient's Healthcare Decision Maker: Named in [facility]    Patient Goals/Plan/Treatment Preferences: Spoke with Lavelle Maier, she lives at home. States that a niece stays with her (2 nieces rotate) at all times. Her brother also comes to visit frequently. States she uses [employer] to get to appts. Pt verified that she has a 2 wheeled walker, shower chair and BSC at home. She also has home O2 through [facility] and wears it at 2L/nc prn. But has been wearing frequently. Current with [employer] and [employer]. Verified PCP and insurance. SW consulted. Transportation/Food Security/Housekeeping Addressed:  No issues identified.

## 2022-07-28 NOTE — ED NOTES
ED to inpatient nurses report    Chief Complaint   Patient presents with    Fatigue      Present to ED from home  LOC: alert and orientated to name, place, date  Vital signs   Vitals:    07/27/22 1933 07/27/22 2030 07/27/22 2142 07/27/22 2239   BP: (!) 122/50 (!) 122/46 (!) 138/47 (!) 123/48   Pulse: 69 53 54 53   Resp: 20 18 16 16   Temp: 97.7 °F (36.5 °C)      TempSrc: Oral      SpO2: 98% 97% 92% 99%   Weight: 130 lb (59 kg)      Height: 5' (1.524 m)         Oxygen Baseline 2 L NC    Current needs required 2 L NC Bipap/Cpap No  LDAs:   Peripheral IV 07/27/22 Right Antecubital (Active)   Site Assessment Clean, dry & intact 07/27/22 2147   Line Status Flushed;Normal saline locked 07/27/22 2147   Phlebitis Assessment No symptoms 07/27/22 2147   Infiltration Assessment 0 07/27/22 2147   Dressing Status Clean, dry & intact 07/27/22 2147   Dressing Type Transparent 07/27/22 2147     Mobility: Requires assistance * 2  Pending ED orders: none  Present condition: stable    C-SSRS  no risk   Swallow Screening  pass    Electronically signed by Nichole Barone RN on 7/27/2022 at 11:02 PM       Nichole Barone RN  07/27/22 3201

## 2022-07-28 NOTE — ED PROVIDER NOTES
[facility] [address] [address] EMERGENCY DEPT    EMERGENCY MEDICINE     Pt Name: Ray Bryant  MRN: [id number]  [place] 5/29/1933  Date of evaluation: 7/27/2022  Provider: Apurva Mancera MD    CHIEF COMPLAINT       Chief Complaint   Patient presents with    Fatigue       HISTORY OF PRESENT ILLNESS    Ray Bryant is a pleasant 80 y.o. female   Presents to the emergency department from home for feelings of generalized weakness over the last week. She denies any pain. She reports she feels very tired. She reports she gets light-headed and sometimes feels like she is going to pass out. She denies any chest pain or shortness of breath. No cough, no fever. No neck/back pain. She does report she had a mild R sided headache earlier. No rash or vomiting. No urinary symptoms. Triage notes and Nursing notes were reviewed by myself. Any discrepancies are addressed above.     PAST MEDICAL HISTORY     Past Medical History:   Diagnosis Date    Anxiety     Blind right eye     Breast cancer ([facility] 05/23/2016    IDC @ [facility] only chemo, no surgery    Breast cancer metastasized to liver (5/16) and brain (7/21) ([address] 05/10/2016    Liver lesion noted 5/16 : Brain 7/21    Carotid stenosis      Left ICA 25%    Colostomy in place ([address] 05/27/2016    Degenerative arthritis of cervical spine 05/2007    GERD (gastroesophageal reflux disease) 11/2006    History of craniotomy 07/2021    mets from breast CA    Hx antineoplastic chemo 2016    left breast cancer, no surgery    Hypercholesteremia     Hypoestrogenism     Hypothyroidism     Lumbago     Lumbosacral spinal stenosis 05/2019    MDRO (multiple drug resistant organisms) resistance 2008    pt stated cleared    Metastasis ([address] 2019    from breast to brain    Personal history of cardiac dysrhythmia     Respiratory tract infection due to COVID-19 virus 01/21/2022    Sigmoid diverticulosis 08/2004    Spondylolisthesis of lumbosacral region 08/2004    Steroid-induced hyperglycemia     Subclavian artery stenosis (HCC)     left    Superior and Inferior Pubic ramus fracture, right, closed, initial encounter ([facility] 05/21/2019    SVT (supraventricular tachycardia) ([facility] 06/2007    Temporal arteritis (HCC)     Vertebral artery occlusion     left    Vitamin D deficiency 06/2017       SURGICAL HISTORY       Past Surgical History:   Procedure Laterality Date    CARDIAC CATHETERIZATION  5/07    CATARACT REMOVAL  right 1/08; left 2/08    CHOLECYSTECTOMY  1/03    COLONOSCOPY  11/06    COLOSTOMY  09/04/2018    REVERSAL OF COLOSTOMY    CRANIOTOMY Right 7/2/2021    CRANIOTOMY FOR RESECTION/DEBULKING OF RIGHT SIDE INTRA BRAIN TUMOR performed by Rosibel Danielle MD at [address] [facility] Right 5/11/2022    RIGHT SIDED CRANIOTOMY FOR TUMOR performed by Rosibel Danielle MD at [place] [address], COLON, DIAGNOSTIC      EYE REMOVAL Right 03/2017    EYE SURGERY      EYE SURGERY Right 11/06/2017    Dr Abad Howard [facility] [place] [facility] ([address] [place] [address])      partial    OTHER SURGICAL HISTORY  05/27/2016    robotic assisted laparoscopic anterior colon resection and end colostomy    [place] OFFICE/OUTPT VISIT,PROCEDURE ONLY N/A 9/4/2018    COLOSTOMY REVERSAL AND PARASTOMAL HERNIA REPAIR performed by Marge George MD at [facility] / [address] [place] [address] ENDOSCOPY  11/06     BREAST NEEDLE BIOPSY LEFT Left 05/23/2016    IDC       CURRENT MEDICATIONS       Previous Medications    ALBUTEROL (PROVENTIL) (2.5 MG/3ML) 0.083% NEBULIZER SOLUTION    Take 3 mLs by nebulization every 6 hours as needed for Wheezing    ALBUTEROL SULFATE  (90 BASE) MCG/ACT INHALER    Inhale 2 puffs into the lungs every 6 hours as needed for Wheezing or Shortness of Breath    ALPRAZOLAM (XANAX) 0.25 MG TABLET    take 1 tablet by mouth twice a day if needed for anxiety    AMIODARONE (CORDARONE) 200 MG TABLET    Take 1 tablet by mouth in the morning.     CALCIUM CARBONATE-VITAMIN D (OYSTER SHELL CALCIUM/D) 500-200 MG-UNIT TABS    take 1 tablet by mouth twice a day    DOCUSATE (COLACE, DULCOLAX) 100 MG CAPS    Take 100 mg by mouth 2 times daily    ISOSORBIDE MONONITRATE (IMDUR) 30 MG EXTENDED RELEASE TABLET    take 1 tablet by mouth once daily    LEVETIRACETAM (KEPPRA) 500 MG TABLET    take 1 tablet by mouth twice a day    LEVOTHYROXINE (SYNTHROID) 100 MCG TABLET    take 1 tablet by mouth once daily    LORATADINE (CLARITIN) 10 MG TABLET    take 1 tablet by mouth once daily    MELATONIN 3 MG TABS TABLET    Take 1 tablet by mouth nightly as needed (insomnia)    METOPROLOL TARTRATE (LOPRESSOR) 25 MG TABLET    Take 0.5 tablets by mouth 2 times daily    MIDODRINE (PROAMATINE) 2.5 MG TABLET    Take 1 tablet by mouth 3 times daily    MULTIPLE VITAMINS-MINERALS (CENTRUM SILVER ADULT 50+) TABS    Take 1 tablet by mouth daily    NEBULIZERS (COMPRESSOR/NEBULIZER) MISC    1 Device by Does not apply route 4 times daily as needed (Shortness of breath and wheezing)    NUTRITIONAL SUPPLEMENTS (ENSURE ORIGINAL) LIQD    Take 1 Can by mouth 2 times daily    ONDANSETRON (ZOFRAN-ODT) 4 MG DISINTEGRATING TABLET    Take 4 mg by mouth every 8 hours as needed    PANTOPRAZOLE (PROTONIX) 40 MG TABLET    take 1 tablet by mouth once daily    POLYETHYLENE GLYCOL (GLYCOLAX) 17 GM/SCOOP POWDER    Take 17 g by mouth daily as needed (constipation)    PREDNISONE (DELTASONE) 1 MG TABLET    Take 1 tablet by mouth daily - resume after completes decadron    RA BIOTIN 1000 MCG TABS    Take 1,000 mcg by mouth 2 times daily    REFRESH PLUS 0.5 % SOLN OPHTHALMIC SOLUTION    use as directed    SERTRALINE (ZOLOFT) 50 MG TABLET    take 1 tablet by mouth twice a day    SIMVASTATIN (ZOCOR) 20 MG TABLET    take 1 tablet by mouth every evening    TRAZODONE (DESYREL) 50 MG TABLET    take 1 tablet by mouth at bedtime if needed for sleep    VITAMIN D (ERGOCALCIFEROL) 1.25 MG (68302 UT) CAPS CAPSULE    take 1 capsule by mouth every week    ZINC SULFATE (ZINCATE) 220 (50 ZN) MG CAPSULE    Take 1 capsule by mouth daily       ALLERGIES     Bactrim [sulfamethoxazole-trimethoprim], Demerol, and Pcn [penicillins]    FAMILY HISTORY       Family History   Problem Relation Age of Onset    Breast Cancer Sister 61        breast    Cancer Brother 79        lung    Cancer Brother 68        colon    Cancer Son 48        lung        SOCIAL HISTORY       Social History     Socioeconomic History    Marital status:      Spouse name: None    Number of children: 0    Years of education: 10th grade     Highest education level: None   Tobacco Use    Smoking status: Former     Packs/day: 0.50     Types: Cigarettes    Smokeless tobacco: Never   Vaping Use    Vaping Use: Never used   Substance and Sexual Activity    Alcohol use: No    Drug use: No    Sexual activity: Never     Social Determinants of Health     Financial Resource Strain: Low Risk     Difficulty of Paying Living Expenses: Not very hard   Food Insecurity: No Food Insecurity    Worried About Running Out of Food in the Last Year: Never true    Ran Out of Food in the Last Year: Never true   Transportation Needs: No Transportation Needs    Lack of Transportation (Medical): No    Lack of Transportation (Non-Medical): No   Physical Activity: Inactive    Days of Exercise per Week: 0 days    Minutes of Exercise per Session: 0 min   Stress: Stress Concern Present    Feeling of Stress : To some extent   Social Connections: Unknown    Frequency of Communication with Friends and Family: More than three times a week    Frequency of Social Gatherings with Friends and Family: More than three times a week    Attends Club or Organization Meetings: Never   Housing Stability: Low Risk     Unable to Pay for Housing in the Last Year: No    Number of [place] in the Last Year: 1    Unstable Housing in the Last Year: No       REVIEW OF SYSTEMS     Review of Systems   Constitutional:  Positive for activity change and fatigue.  Negative for chills and fever. Respiratory:  Negative for cough and shortness of breath. Cardiovascular:  Negative for chest pain and leg swelling. Gastrointestinal:  Negative for abdominal pain and vomiting. Skin:  Negative for color change and rash. All other systems reviewed and are negative. Except as noted above the remainder of the review of systems was reviewed and is. PHYSICAL EXAM    (up to 7 for level 4, 8 or more for level 5)     ED Triage Vitals [07/27/22 1933]   BP Temp Temp Source Heart Rate Resp SpO2 Height Weight   (!) 122/50 97.7 °F (36.5 °C) Oral 69 20 98 % 5' (1.524 m) 130 lb (59 kg)       Physical Exam  Vitals and nursing note reviewed. HENT:      Head: Normocephalic and atraumatic. Nose: Nose normal.      Mouth/Throat:      Lips: Pink. Mouth: Mucous membranes are moist.   Eyes:      General: Lids are normal.      Comments: R eye prosthesis; very poor vision L eye, L eye pupil reactive to light   Neck:      Vascular: No JVD. Cardiovascular:      Rate and Rhythm: Normal rate and regular rhythm. Heart sounds: No murmur heard. No friction rub. No gallop. Pulmonary:      Effort: Pulmonary effort is normal.      Breath sounds: Normal breath sounds and air entry. No wheezing, rhonchi or rales. Abdominal:      Palpations: Abdomen is soft. Tenderness: There is no abdominal tenderness. Musculoskeletal:      Cervical back: Neck supple. Skin:     General: Skin is warm and dry. Findings: No rash. Neurological:      Mental Status: She is alert. GCS: GCS eye subscore is 4. GCS verbal subscore is 5. GCS motor subscore is 6. Sensory: Sensation is intact. Comments: 4+/5 strength in all extremities, equal b.l   Psychiatric:         Behavior: Behavior is cooperative.        DIAGNOSTIC RESULTS     EKG:(none if blank)  All EKG's are interpreted by [facility] Phys[facility] who either signs or Co-signs this chart in the absence of a cardiologist.    Sinus bradycardia  ST & T wave abnormality, consider anterolateral ischemia    RADIOLOGY: (none if blank)   Interpretation per the Radiologistbelow, if available at the time of this note:    CT Head WO Contrast   Final Result   1. There are 2 new round and confluent areas of low attenuation in the    right parietal lobe and right temporal lobe, as detailed above. Worsening    confluent white matter changes are noted within portions of right cerebral    hemisphere, as detailed above. Findings are compatible with worsening    neoplastic disease. 2. New since the prior study is approximately 5-6 mm of right-to-left    midline shift at the level of the septum pellucidum. No hydrocephalus or    downward herniation. 3. Postsurgical changes are again noted with decreasing postoperative    fluid underlying the craniotomy site. 4. Neurosurgical consultation is advised. This document has been electronically signed by: Suleiman Soriano M.D. on    07/27/2022 10:14 PM      All CTs at this facility use dose modulation techniques and iterative    reconstructions, and/or weight-based dosing   when appropriate to reduce radiation to a low as reasonably achievable. XR CHEST PORTABLE   Final Result   1. No acute findings.       This document has been electronically signed by: Ethel Cano MD on    07/27/2022 08:51 PM          LABS:  Labs Reviewed   BASIC METABOLIC PANEL W/ REFLEX TO MG FOR LOW K - Abnormal; Notable for the following components:       Result Value    BUN 24 (*)     All other components within normal limits   CBC WITH AUTO DIFFERENTIAL - Abnormal; Notable for the following components:    RBC 4.05 (*)     Hemoglobin 11.4 (*)     MCHC 29.5 (*)     RDW-CV 15.6 (*)     RDW-SD 54.9 (*)     All other components within normal limits   GLOMERULAR FILTRATION RATE, ESTIMATED - Abnormal; Notable for the following components:    Est, Glom Filt Rate 68 (*)     All other components within normal limits URINE WITH REFLEXED MICRO - Abnormal; Notable for the following components:    Leukocyte Esterase, Urine SMALL (*)     All other components within normal limits   COVID-19, RAPID   TROPONIN   ANION GAP   OSMOLALITY       All other labs were within normal range or not returned as of this dictation. Please note, any cultures that may have been sent were not resulted at the time of this patient visit. EMERGENCY DEPARTMENT COURSE andMedical Decision Making:     MDM  /   I spoke with Dr. Ivonne Proctor, he is aware of patient, reports he has signed off as her recommends palliative care for patient. Pt presents to the ER with worsening generalized weakness over the last week, not able to get around or care for self, ct scan findings noted, known brain mets, appears progressive, dr Junius Libman recommends palliative care, per review of records, patient was seen by rad onc for palliatrive therapy with plan for repeat mri. Given patients condition, will admit, consider MRI and continue to monitor. I d./w patient regarding code status, she requests her status to be DNR CC-A at this time. , dr Larry Ban informed. Strict returnprecautions and follow up instructions were discussed with the patient with which the patient agrees      ED Medications administered this visit:    Medications   0.9 % sodium chloride bolus (0 mLs IntraVENous Stopped 7/27/22 2146)   Dexamethasone Sodium Phosphate injection 10 mg (10 mg IntraVENous Given 7/27/22 2236)         Procedures: (None if blank)         CLINICAL IMPRESSION     1. Generalized weakness    2. Brain mass          DISPOSITION/PLAN   DISPOSITION Admitted 07/27/2022 11:19:52 PM      PATIENT REFERRED TO:  No follow-up provider specified.     DISCHARGE MEDICATIONS:  New Prescriptions    No medications on file           (Please note that portions of this note were completed with a voice recognition program.  Efforts were made to edit the dictations but occasionallywords are mis-transcribed.)      Chris Qureshi MD,FACEP (electronically signed)  Attending Physician, Emergency Department         Tony Baker MD  07/28/22 0001

## 2022-07-28 NOTE — PROGRESS NOTES
Initial Evaluation          Patient:   Gricelda Davies  YOB: 1933  Age:  80 y.o. Room:  [address] [place] [address]  MRN:  [id number]   Acct: [de-identified]    Date of Admission:  7/27/2022  7:28 PM  Date of Service:  7/28/2022  Completed By:  Patrick Bedoya RN                 Reason for Palliative Care Evaluation:-             [x] Code Status Discussion              [x] Goals of Care              [] Pain/Symptom Management               [] Emotional Support              [] Other:                   Current Issues:-  []  Pain  [x]  Fatigue  []  Nausea  []  Anxiety  []  Depression  []  Shortness of Breath  []  Constipation  []  Appetite  []  Other:             Advance Directives:-  [] [place] DNR Form  [x] Living Will  [x] Medical POA             Current Code Status:-  [] Full Resuscitation  [x] DNR-Comfort Care-Arrest  [] DNR-Comfort Care       [] Limited Resuscitation             [] No CPR            [] No shock            [] No ET intubation/reintubation            [] No resuscitative medications            [] Other limitation:              Palliative Performance Status:          [] 60%  Ambulation reduced; Significant disease;Can't do hobbies/housework; intake normal or reduced; occasional assist; LOC full/confusion        [x] 50%  Mainly sit/lie; Extensive disease; Can't do any work; Considerable assist; intake normal or reduced; LOC full/confusion        [] 40%  Mainly in bed; Extensive disease; Mainly assist; intake normal or reduced; LOC full/confusion         [] 30%  Bed Bound; Extensive disease; Total care; intake reduced; LOC full/confusion        [] 20%  Bed Bound; Extensive disease; Total care; intake minimal; Drowsy/coma        [] 10%  Bed Bound; Extensive disease;  Total care; Mouth care only; Drowsy/coma        [] 0  Death        Goals of care evaluation:-        The patient goals of care are to provide comfort care/supportive services/palliation & relieve suffering:  Goals of care discussed with:  [x] Patient independently  [] Patient and Family  [] Family or Healthcare DPOA independently  [] Unable to discuss with patient, family/DPOA not present         Family/Patient Discussion:  Spoke with Latanya at bedside. No visitors were present. Minh Blackman reports that she is feeling better and wants to go home. Discussed cancer treatment plan. She stated she wants to continue treatment and plans on going to the oncologists office for chemo. She stated her brother and niece stay with her and help her at home with ADLs. Discussed code status. Explained the different code options. Latanya said \"no\" to resuscitative measures and intubation. Discussed ACP. Latanya wishes to change her current HCPOA documents and name a different POA. Plan/Follow-Up:  Spiritual care consult placed to assist with new POA forms. Perfect serve message sent to Dr. Lior Arita with an update regarding Latanya's wishes to change code status to limited x4.         Electronically signed by Ajay Roque RN on 7/28/2022 at 1:59 PM           Palliative Care Office: [phone]

## 2022-07-28 NOTE — CONSULTS
[facility] [address], [address]        [place], [address] [facility]        Cipriano Due: [phone]        F: [phone]       [email or web address]            INPATIENT CONSULTATION    Date of Service: 2022  Patient ID: Jillian Mauro   : 1933  MRN: [id number]   Acct Number: [de-identified]         Requesting Provider:  Inpatient team  Reason for request: Evaluation for the potential role of palliative radiation therapy. CONSULTANT: Tayla Olivera MD MS    CHIEF COMPLAINT: Evaluation for the potential role of palliative radiation therapy. ASSESSMENT:  Cancer Staging  Metastatic breast cancer ([facility] [address]  Staging form: Breast, AJCC 8th Edition  - Clinical stage from 5/10/2018: Stage IV (rcT1c, cN0, cM1, G3, ER+, KY+, HER2+) - Signed by Jaycee Ricks MD on 2021      PLAN:  -Patient seen as an inpatient, sitting by bedside in no acute distress  -Overall she appears well, no neurological complaints, stable on limited exam  -On Dexamethasone, continue as tolerated  -Seen in outpatient clinic this week, plan for MRI brain Raymond protocol on 22  -If possible, recommend MRI imaging as inpatient given recent CT head findings of progression  -Will plan for palliative radiation therapy treating intracranial disease following MRI brain  -Will start treatment as inpatient if extended stay is expected  -Continue care as per inpatient team  -Please contact radiation oncology if her condition were to change or worsen, or for ques/concerns    Thank you for allowing my assistance in the care of your patient. HISTORY OF PRESENT ILLNESS:  Oncology History Overview Note   Jillian aMuro is a 80 y.o. female with a history of breast cancer which was metastatic at the time of diagnosis, in 2018. She had liver lesions and colonoscopy was negative, +breast mass. The liver biopsy on 05/10/2016 was positive for liver metastasis with breast primary.   She underwent chemotherapy and Herceptin. She did very well and responded completely. Last chemotherapy was somewhere in 07/2019, two years ago. She has been doing well since then. Recently, she had frequent falls and she has hit her head and she was brought to the emergency room and was found to have a large, 5-cm brain mass in the right occipitotemporal lobe junction. Dr. Deng Oglesby performed a craniotomy and resection of the mass on 07/02/2021 and the final pathology showed that is a poorly differentiated carcinoma consistent with breast carcinoma. ER, PA, and HER2 are not done. The postsurgery MRI done on 07/04/2021 showed large cavity at the surgical site. No other lesions were found. 06/28/21 CT Chest    Impression   1. No acute traumatic intrathoracic findings. 06/28/21 CT Abd/pelv    Impression   Multiple chronic findings. No acute abdominal or pelvic abnormality. 07/01/21 MRI Brain w Cons    Impression    Redemonstration of a 5.5 cm irregularly enhancing intra-axial mass at the right temporal occipital junction as evidence for high-grade neoplasm with stable 1 cm leftward midline shift for surgical planning. 07/04/21 MRI Brain w wo Cons    Impression       1. The patient has undergone interval removal of the large mass in the right posterior temporal lobe. 2. There is a residual 3.9 x 2.1 x 1.7 cm cavity at the surgical site with increased T1 weighted signal intensity suggestive of hemorrhage. 3. There is moderate surrounding edema. 4. There is significantly less mass effect and midline deviation than on previous study. 5. There are probable ischemic changes in the white matter with no evidence of an acute infarct. 6. There are postoperative changes in the right orbit.    7. Otherwise negative MRI scan of the brain with and without intravenous contrast.     05/09/2022 InPt Consult [facility]    INTERVAL HISTORY:  Rhea Cast was initiated on Enhertu under the care of Dr. Jose G Harley.  She had cavitary lesion at the surgical site measuring 4.3 x 3.5 x 2.8 cm in size significantly larger than on previous study dated 12/8/2022. Extra-axial fluid collection at the surgical site measuring 4.9 x 0.8 cm in size. 3. Abnormal enhancement along the anterior aspect of the cavity and extensive surrounding edema suggestive of tumor. There is mass effect upon the right lateral ventricle. 4. Mild atrophy and dilatation of the third and lateral ventricles. 5. Increased signal intensity in the white matter which may represent ischemic changes or possibly changes secondary to radiation therapy. 6. Small incidental meningioma over the left frontal lobe laterally. 7. Inflammatory changes in the mastoid air cells bilaterally. Lisa Lees 06/06/2022 As per Neurosurgery follow up    Assessment/Plan:  Status Post craniotomy and reexploration for resection of recurrent intercranial lesion performed by Dr. Nayana Dodge, without complication  Doing well overall  Encouraged gradual increase in physical and mental activity. Fall precaution and home safety education provided to patient. Follow-up: 2-week follow-up to ascertain continued progress towards healing of the incision and for more detailed review of the latest MRI imaging following input from the neurosurgeon. A follow-up appointment with oncology is processed along with a appointment to wound care for evaluation of the incision to rule out any dehiscence or complications    45/74/2381 As per wound care,  \" Lamin Alatorre is a 80 y.o. female who presents today for evaluation of nonhealing surgical wound to head. Patient is s/p re-exploration craniotomy and resection of recurrent brain lesion per Dr. Padmini Valente 5/11/22. At follow up visit 6/6 with PA for suture removal surgical wound dehiscence was noted to proximal portion of wound prompting referral to clinic today.   Patient has been leaving area open to air, states that she has aid assist with shower weekly, has been washing with shampoo. She notes difficulties with her short term memory post operatively and headaches. She is current with home health-weekly visits per nursing and daily aide. States she lives with her brother. Patient is legally blind, ambulates with walker. She denies any fevers or chills. Denies any further needs or concerns. ..  -nonhealing surgical wound, surgical wound dehiscence- Three small open areas noted along incision line. Adjacent skin very thin, fragile- may be difficult to heal.  Minimal drainage noted today. Will start use of Duoderm thin to wound, change twice weekly. Advised her to keep area clean and dry. Clean with chlorhexidine gluconate weekly. Avoid use of regular shampoos due to scents and moisturizers. Home health nursing to assist with wound care as patient is legally blind, unable to see wounds. Orders sent today.       -No indications of infection to wounds today. Education provided on signs and symptoms of infection. Call clinic or seek emergency care should these occur. Follow up 2-3 weeks. Call clinic with any needs or concerns prior to scheduled visit. \"    06/23/2022 As per neurosurgery,  \"Assessment and Plan:         4 week follow up for incisional site check  MRI of the brain with and without contrast from 6-3-22 discussed with Dr. Jonathan Brumfield. He stated that post op scans showed complete resection of tumor and the new scan on 6-3-22 showed tumor. Discussed with patient that Dr. Jonathan Brumfield stated that she is not a surgical canidate  Continue to follow with Damari Farrell  Discussed with patient that if she continue to need pain medication then she needs to get them from her PCP. Patient verbalized understanding. Continue to follow with wound care  Follow up in 2 month for incisional site check . Call office is symptoms worsen or fail to improve  All patient questions answered. Pt voiced understanding.  Patient instructed to call the office with any questions or concerns\"     07/06/2022 As per ED,  \"The patient presents with near syncope. Patient did not have any chest pain and EKG did not reveal signs of ischemia and troponin was not elevated. Fortunately, the patient's PCP was working a shift in our emergency department and was also able to evaluate the patient. PCP knows the patient well and her prior visits she has been hypotensive. The patient has been mentating appropriately and states that she feels better after the fluid bolus. Prior to the craniotomies, the patient was taken off her midodrine which I suspect is likely the cause of her hypotension. The patient's PCP and I discussed this and we decided to place the patient back on midodrine and give her dose to the emergency department. I discussed the risk of hypotension with the patient especially risk of syncope and fall. The patient understands these risks and through shared decision-making, the patient wishes to go home and restart the midodrine. PCP also agrees with this plan. The patient has a home nurse that can monitor the patient and her response to restarting the midodrine. \"    07/21/2022 As per wound care,  \"Patient examined and evaluated. All available lab work, radiology studies, and progress notes pertaining to [facility] reviewed prior to or during patient visit today.     -nonhealing surgical wound, surgical wound dehiscence- One small open area remains along incision line, no drainage noted. Patient states she prefers to leave open to air. Advised her to keep area clean and dry. I do recommend she clean With chlorhexidine gluconate 1-2 times weekly to help keep clean and decrease bacterial load on scalp. Avoid use of regular shampoos due to scents and moisturizers.       -No indications of infection to wounds today. Education provided on signs and symptoms of infection.   Call clinic or seek emergency care should these occur.     -Encouraged patient and her daughter to discuss new concerns for pressure in her head with surgeon. Seek emergency care with any new onset or worsening of symptoms. \"     Metastatic breast cancer ([address]   7/2/2021 Surgery    Craniotomy with resection of brain mass, performed by Dr. Stuart Felton ([facility])    FINAL DIAGNOSIS:   A-B. Right parietal lobe, masses 1 and 2, biopsies:    Metastatic carcinoma consistent with breast primary. See microscopic description. 7/6/2021 Initial Diagnosis    Breast cancer metastasized to brain, right [facility])      Chemotherapy    Systemic therapy administered under the care of Dr. Nunu Linotn ([address])     5/11/2022 Surgery    Neurosurgery performed under the care of Dr. Stuart Felton (Neurosurgery)    Clinical Information: BRAIN MASS     FINAL DIAGNOSIS:   A-C. Brain, right, lesion, excision:     Metastatic carcinoma consistent with breast primary. Specimen:   A) BRAIN BIOPSY, RIGHT LESION  FS   B) BRAIN BIOPSY, RIGHT LESION   C) BRAIN BIOPSY, RIGHT LESION     Intraoperative Consultation:   A - Frozen Section DX:  Malignancy present. SIO    Received:  10:27 Reported:  10:48     Gross Examination:   A - The container is labeled Latanya Rod, right brain lesion. Received fresh for frozen section analysis are multiple fragments of   red-white tissue aggregating to 1.7 x 1.2 x 0.3 cm. A crush prep is   made and the remaining specimen is entirely submitted for frozen   section analysis. Frozen section remnants are entirely submitted. 1   ss. B - The container is labeled Latanya Sins, right brain lesion. Received in formalin are fragments of pink-white tissue aggregating to   3 x 2.5 x 2 cm. Representative sections are submitted in three   cassettes. ss.     C - The container is labeled Latanya Rod, right brain lesion. Received in formalin are multiple fragments of red-white tissue   aggregating to 2.2 x 1.7 x 0.4 cm.  1 ns. SIO/DKR:[id number]     Microscopic Examination:   A-C.   The specimens demonstrate nests of malignant cells with   associated necrosis. The malignant cells demonstrate nuclear   pleomorphism, prominent nucleoli, and moderate vacuolated cytoplasm. Mitotic figures are readily identifiable. The patient's history of   metastatic poorly differentiated breast carcinoma is noted. Immunohistochemistry for GATA3 is performed on block B2 with adequate   controls. The malignant cells are positive for GATA3. The morphology   and immunohistochemistry are consistent with metastatic breast   carcinoma. Clinical and radiologic correlation is recommended. Ms. Levi Louis is a 80 y.o. female with above mentioned oncologic history seen today for inpatient consultation regarding the potential role for radiation therapy. Review of Systems   Constitutional:  Positive for activity change and fatigue. Eyes:  Positive for visual disturbance. Respiratory:  Negative for chest tightness and shortness of breath. Cardiovascular:  Negative for chest pain. Gastrointestinal:  Negative for abdominal pain. Genitourinary:  Negative for difficulty urinating. Neurological:  Negative for speech difficulty, weakness, numbness and headaches. A complete review of systems was performed and found to be negative except as presented above. PHYSICAL EXAMINATION:     VITAL SIGNS: /60   Pulse 50   Temp 98.2 °F (36.8 °C) (Oral)   Resp 16   Ht 5' (1.524 m)   Wt 136 lb (61.7 kg)   LMP  (LMP Unknown) Comment: age 52 ovaries intact  SpO2 97%   BMI 26.56 kg/m²     ECO - Symptomatic, <50% in bed during the day (Ambulatory and capable of all self care but unable to carry out any work activities. Up and about more than 50% of waking hours)    PAIN: 0/10    Physical Exam  Constitutional:       Appearance: Normal appearance. HENT:      Head: Normocephalic. Cardiovascular:      Rate and Rhythm: Normal rate.    Pulmonary:      Effort: Pulmonary effort is normal.   Abdominal:      General: Abdomen is flat.   Musculoskeletal:         General: Normal range of motion. Skin:     Coloration: Skin is not jaundiced or pale. Neurological:      General: No focal deficit present. Mental Status: She is alert.    Psychiatric:         Mood and Affect: Mood normal.       Past Medical History:   Diagnosis Date    Anxiety     Blind right eye     Breast cancer ([address] 05/23/2016    IDC @ [facility] only chemo, no surgery    Breast cancer metastasized to liver (5/16) and brain (7/21) ([address] 05/10/2016    Liver lesion noted 5/16 : Brain 7/21    Carotid stenosis      Left ICA 25%    Colostomy in place ([address] 05/27/2016    Degenerative arthritis of cervical spine 05/2007    GERD (gastroesophageal reflux disease) 11/2006    History of craniotomy 07/2021    mets from breast CA    Hx antineoplastic chemo 2016    left breast cancer, no surgery    Hypercholesteremia     Hypoestrogenism     Hypothyroidism     Lumbago     Lumbosacral spinal stenosis 05/2019    MDRO (multiple drug resistant organisms) resistance 2008    pt stated cleared    Metastasis ([address] 2019    from breast to brain    Personal history of cardiac dysrhythmia     Respiratory tract infection due to COVID-19 virus 01/21/2022    Sigmoid diverticulosis 08/2004    Spondylolisthesis of lumbosacral region 08/2004    Steroid-induced hyperglycemia     Subclavian artery stenosis (HCC)     left    Superior and Inferior Pubic ramus fracture, right, closed, initial encounter ([address] 05/21/2019    SVT (supraventricular tachycardia) ([address] 06/2007    Temporal arteritis ([address]     Vertebral artery occlusion     left    Vitamin D deficiency 06/2017       Past Surgical History:   Procedure Laterality Date    CARDIAC CATHETERIZATION  5/07    CATARACT REMOVAL  right 1/08; left 2/08    CHOLECYSTECTOMY  1/03    COLONOSCOPY  11/06    COLOSTOMY  09/04/2018    REVERSAL OF COLOSTOMY    CRANIOTOMY Right 7/2/2021    CRANIOTOMY FOR RESECTION/DEBULKING OF RIGHT SIDE INTRA BRAIN TUMOR performed by Glendy Miller, MD at [address] Right 5/11/2022    RIGHT SIDED CRANIOTOMY FOR TUMOR performed by Jersey Siddiqui MD at [facility], COLON, DIAGNOSTIC      EYE REMOVAL Right 03/2017    EYE SURGERY      EYE SURGERY Right 11/06/2017    Dr Candice Peraza in [facility] ([address] [place] [address])      partial    OTHER SURGICAL HISTORY  05/27/2016    robotic assisted laparoscopic anterior colon resection and end colostomy    CT OFFICE/OUTPT VISIT,PROCEDURE ONLY N/A 9/4/2018    COLOSTOMY REVERSAL AND PARASTOMAL HERNIA REPAIR performed by Pablo Guan MD at [address] [place] [address] / PULMONARY SHUNT  2002    UPPER GASTROINTESTINAL ENDOSCOPY  11/06     BREAST NEEDLE BIOPSY LEFT Left 05/23/2016    IDC       Family History   Problem Relation Age of Onset    Breast Cancer Sister 61        breast    Cancer Brother 79        lung    Cancer Brother 68        colon    Cancer Son 48        lung       Social History     Socioeconomic History    Marital status:      Spouse name: Not on file    Number of children: 0    Years of education: 10th grade     Highest education level: Not on file   Occupational History    Not on file   Tobacco Use    Smoking status: Former     Packs/day: 0.50     Types: Cigarettes    Smokeless tobacco: Never   Vaping Use    Vaping Use: Never used   Substance and Sexual Activity    Alcohol use: No    Drug use: No    Sexual activity: Never   Other Topics Concern    Not on file   Social History Narrative    Not on file     Social Determinants of Health     Financial Resource Strain: Low Risk     Difficulty of Paying Living Expenses: Not very hard   Food Insecurity: No Food Insecurity    Worried About Running Out of Food in the Last Year: Never true    [address] in the Last Year: Never true   Transportation Needs: No Transportation Needs    Lack of Transportation (Medical): No    Lack of Transportation (Non-Medical):  No   Physical Activity: Inactive    Days of Exercise per Week: 0 days    Minutes of Exercise per Session: 0 min   Stress: Stress Concern Present    Feeling of Stress :  To some extent   Social Connections: Unknown    Frequency of Communication with Friends and Family: More than three times a week    Frequency of Social Gatherings with Friends and Family: More than three times a week    Attends Gnosticism Services: Not on file    Active Member of Clubs or Organizations: Not on file    Attends Club or Organization Meetings: Never    Marital Status: Not on file   Intimate Partner Violence: Not on file   Housing Stability: [address] to Pay for Housing in the Last Year: No    Number of [place] in the Last Year: 1    Unstable Housing in the Last Year: No       Allergies   Allergen Reactions    Bactrim [Sulfamethoxazole-Trimethoprim] Swelling     Of tongue    Demerol Rash     nausea    Pcn [Penicillins] Rash        Current Facility-Administered Medications   Medication Dose Route Frequency Provider Last Rate Last Admin    albuterol (PROVENTIL) nebulizer solution 2.5 mg  2.5 mg Nebulization Q6H PRN Davida Morley MD        ALPRAZolam (XANAX) tablet 0.25 mg  0.25 mg Oral BID PRN Davida Morley MD        amiodarone (CORDARONE) tablet 200 mg  200 mg Oral Daily Davida Morley MD   200 mg at 07/28/22 0917    calcium-cholecalciferol 500-200 MG-UNIT per tablet 1 tablet  1 tablet Oral BID Davida Morley MD   1 tablet at 07/28/22 0915    docusate sodium (COLACE) capsule 100 mg  100 mg Oral BID Davida Morley MD   100 mg at 07/28/22 0916    isosorbide mononitrate (IMDUR) extended release tablet 30 mg  30 mg Oral Daily Davida Morley MD   30 mg at 07/28/22 0916    levETIRAcetam (KEPPRA) tablet 500 mg  500 mg Oral BID Davida Morley MD   500 mg at 07/28/22 0916    levothyroxine (SYNTHROID) tablet 100 mcg  100 mcg Oral Daily Davida Morley MD   100 mcg at 07/28/22 0520    cetirizine (ZYRTEC) tablet 5 mg  5 mg Oral Daily Claritza Castro MD   5 mg at 07/28/22 0915    melatonin tablet 3 mg  3 mg Oral Nightly PRN Claritza Castro MD        metoprolol tartrate (LOPRESSOR) tablet 12.5 mg  12.5 mg Oral BID Claritza Castro MD   12.5 mg at 07/28/22 0916    midodrine (PROAMATINE) tablet 2.5 mg  2.5 mg Oral TID WC Claritza Castro MD   2.5 mg at 07/28/22 1327    multivitamin 1 tablet  1 tablet Oral Daily Claritza Castro MD   1 tablet at 07/28/22 0915    pantoprazole (PROTONIX) tablet 40 mg  40 mg Oral QAM AC Claritza Castro MD   40 mg at 07/28/22 0519    polyethylene glycol (GLYCOLAX) packet 17 g  17 g Oral Daily PRN Claritza Castro MD        polyethyl glycol-propyl glycol 0.4-0.3 % (SYSTANE) ophthalmic solution 1 drop  1 drop Both Eyes 4x Daily Claritza Castro MD   1 drop at 07/28/22 1326    sertraline (ZOLOFT) tablet 50 mg  50 mg Oral BID Claritza Castro MD   50 mg at 07/28/22 0916    atorvastatin (LIPITOR) tablet 10 mg  10 mg Oral Nightly Claritza Castro MD   10 mg at 07/28/22 0138    traZODone (DESYREL) tablet 50 mg  50 mg Oral Nightly PRN Claritza Castro MD        [START ON 8/2/2022] vitamin D (ERGOCALCIFEROL) capsule 50,000 Units  50,000 Units Oral Weekly Claritza Castro MD        zinc sulfate (ZINCATE) capsule 50 mg  50 mg Oral Daily Claritza Castro MD   50 mg at 07/28/22 0916    sodium chloride flush 0.9 % injection 5-40 mL  5-40 mL IntraVENous 2 times per day Claritza Castro MD        sodium chloride flush 0.9 % injection 5-40 mL  5-40 mL IntraVENous PRN Lona Ceballos MD        0.9 % sodium chloride infusion   IntraVENous LAYN Claritza Castro MD        ondansetron (ZOFRAN-ODT) disintegrating tablet 4 mg  4 mg Oral Q8H LAYN Claritza Castro MD        Or    ondansetron (ZOFRAN) injection 4 mg  4 mg IntraVENous Q6H LAYN Claritza Castro MD        acetaminophen (TYLENOL) tablet 650 mg  650 mg Oral Q6H PRN Lona Fowler Jennifer Matias MD   650 mg at 07/28/22 1331    Or    acetaminophen (TYLENOL) suppository 650 mg  650 mg Rectal Q6H PRN Dami Powers MD        insulin lispro (HUMALOG) injection vial 0-4 Units  0-4 Units SubCUTAneous TID WC Dami Powers MD   1 Units at 07/28/22 1324    insulin lispro (HUMALOG) injection vial 0-4 Units  0-4 Units SubCUTAneous Nightly Willian Ceballos MD        0.9 % sodium chloride infusion   IntraVENous Continuous Dami Powers MD 40 mL/hr at 07/28/22 0141 New Bag at 07/28/22 0141    glucose chewable tablet 16 g  4 tablet Oral PRN Héctor Ceballos MD        dextrose bolus 10% 125 mL  125 mL IntraVENous PRN Dami Powers MD        Or    dextrose bolus 10% 250 mL  250 mL IntraVENous PRN Héctor Ceballos MD        glucagon (rDNA) injection 1 mg  1 mg SubCUTAneous PRN Héctor Ceballos MD        dextrose 10 % infusion   IntraVENous Continuous PRN Dami Powers MD        dexamethasone (DECADRON) injection 4 mg  4 mg Oral Q6H PAMELA Rodriguez - CNP   4 mg at 07/28/22 1325       No outpatient medications have been marked as taking for the 7/27/22 encounter [facility] Encounter). LABORATORY STUDIES:   Onc labs:   Lab Results   Component Value Date/Time     01/21/2022 03:38 PM       Lab Results   Component Value Date    CREATININE 0.7 07/28/2022     Lab Results   Component Value Date    BUN 19 07/28/2022       PATHOLOGY: As per HPI above. RADIOLOGIC STUDIES: As per HPI above. Electronically signed by Julieta Nielsen on 7/28/22 at 1:44 PM EDT     ATTESTATION: 15 minutes were spent with the patient at today's visit reviewing pertinent information related to their oncologic diagnosis, including any recent labs, imaging, follow ups and plan of care going forward.     CC:Dr. Atiya Miller ([address]) Dr. Deborah Astudillo ([facility])   ACC:[facility]

## 2022-07-28 NOTE — H&P
INTERNAL MEDICINE SPECIALTIES    History & Physical    Patient:  Darrel Gonzalez  YOB: 1933  Date of Service: 7/28/2022  MRN: [id number]   Acct:  [de-identified]   Primary Care Physician: Patric Gill MD    Chief Complaint:  generalized fatigue    History of Present Illness:   History obtained from the patient. The patient is a 80 y.o. 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 She had been treated with chemotherapy but no surgery for the left breast CA, she however did undergo craniotomy for resection / debulking of the right-sided intracranial metastatic tumor on 05/11/ 22. She has a history of GERD, DJD, HLD, hypothyroidism, MDRO resistance. She had presented to the emergency room with generalized weakness over a period of a week, she  has had some mild right-sided headaches and complained of feeling lightheaded, she at times feels like she may pass out. She complained of generalized fatigue no rhinorrhea nasal congestion or sore throat and has had some nausea without vomiting. There has been no fever or chills. There has been no dysuria material flank pains or urgency. Head CT scan obtained in the ED had shown 2 new round and confluent areas of low attenuation in the right parietal lobe and right temporal lobe,  worsening confluent white matter changes are noted within portions of right cerebral hemisphere, findings are compatible with worsening neoplastic disease. Also new since the prior study is a 5-6 mm right to left midline shift in the level of the septum pellucidum.     Past Medical History:        Diagnosis Date    Anxiety     Blind right eye     Breast cancer ([facility] 05/23/2016    [facility] only chemo, no surgery    Breast cancer metastasized to liver (5/16) and brain (7/21) ([facility] [address] 05/10/2016    Liver lesion noted 5/16 : Brain 7/21    Carotid stenosis      Left ICA 25%    Colostomy in place [facility]) 05/27/2016 Degenerative arthritis of cervical spine 05/2007    GERD (gastroesophageal reflux disease) 11/2006    History of craniotomy 07/2021    mets from breast CA    Hx antineoplastic chemo 2016    left breast cancer, no surgery    Hypercholesteremia     Hypoestrogenism     Hypothyroidism     Lumbago     Lumbosacral spinal stenosis 05/2019    MDRO (multiple drug resistant organisms) resistance 2008    pt stated cleared    Metastasis ([address] 2019    from breast to brain    Personal history of cardiac dysrhythmia     Respiratory tract infection due to COVID-19 virus 01/21/2022    Sigmoid diverticulosis 08/2004    Spondylolisthesis of lumbosacral region 08/2004    Steroid-induced hyperglycemia     Subclavian artery stenosis (HCC)     left    Superior and Inferior Pubic ramus fracture, right, closed, initial encounter ([address] 05/21/2019    SVT (supraventricular tachycardia) ([address] 06/2007    Temporal arteritis ([address]     Vertebral artery occlusion     left    Vitamin D deficiency 06/2017       Past Surgical History:        Procedure Laterality Date    CARDIAC CATHETERIZATION  5/07    CATARACT REMOVAL  right 1/08; left 2/08    CHOLECYSTECTOMY  1/03    COLONOSCOPY  11/06    COLOSTOMY  09/04/2018    REVERSAL OF COLOSTOMY    CRANIOTOMY Right 7/2/2021    CRANIOTOMY FOR RESECTION/DEBULKING OF RIGHT SIDE INTRA BRAIN TUMOR performed by Charmayne Bump, MD at [address] [place] [address] Right 5/11/2022    RIGHT SIDED CRANIOTOMY FOR TUMOR performed by Charmayne Bump, MD at [place] [address], COLON, DIAGNOSTIC      EYE REMOVAL Right 03/2017    EYE SURGERY      EYE SURGERY Right 11/06/2017    Dr Carter Sit in [facility] ([address] [place] [address])      partial    OTHER SURGICAL HISTORY  05/27/2016    robotic assisted laparoscopic anterior colon resection and end colostomy    MD OFFICE/OUTPT VISIT,PROCEDURE ONLY N/A 9/4/2018    COLOSTOMY REVERSAL AND PARASTOMAL HERNIA REPAIR performed by Faiza Son MD at [facility] SUBCLAVIAN / PULMONARY SHUNT  2002    UPPER GASTROINTESTINAL ENDOSCOPY  11/06     BREAST NEEDLE BIOPSY LEFT Left 05/23/2016    IDC       Home Medications:   No current facility-administered medications on file prior to encounter. Current Outpatient Medications on File Prior to Encounter   Medication Sig Dispense Refill    amiodarone (CORDARONE) 200 MG tablet Take 1 tablet by mouth in the morning.  30 tablet 0    midodrine (PROAMATINE) 2.5 MG tablet Take 1 tablet by mouth 3 times daily 90 tablet 3    sertraline (ZOLOFT) 50 MG tablet take 1 tablet by mouth twice a day 180 tablet 1    Calcium Carbonate-Vitamin D (OYSTER SHELL CALCIUM/D) 500-200 MG-UNIT TABS take 1 tablet by mouth twice a day 180 tablet 3    RA BIOTIN 1000 MCG TABS Take 1,000 mcg by mouth 2 times daily 60 tablet 5    vitamin D (ERGOCALCIFEROL) 1.25 MG (58950 UT) CAPS capsule take 1 capsule by mouth every week 5 capsule 5    metoprolol tartrate (LOPRESSOR) 25 MG tablet Take 0.5 tablets by mouth 2 times daily 60 tablet 3    levothyroxine (SYNTHROID) 100 MCG tablet take 1 tablet by mouth once daily 90 tablet 0    ALPRAZolam (XANAX) 0.25 MG tablet take 1 tablet by mouth twice a day if needed for anxiety 60 tablet 2    isosorbide mononitrate (IMDUR) 30 MG extended release tablet take 1 tablet by mouth once daily 90 tablet 1    simvastatin (ZOCOR) 20 MG tablet take 1 tablet by mouth every evening 90 tablet 1    docusate (COLACE, DULCOLAX) 100 MG CAPS Take 100 mg by mouth 2 times daily 60 capsule 1    Nebulizers (COMPRESSOR/NEBULIZER) MISC 1 Device by Does not apply route 4 times daily as needed (Shortness of breath and wheezing) 1 each 3    albuterol (PROVENTIL) (2.5 MG/3ML) 0.083% nebulizer solution Take 3 mLs by nebulization every 6 hours as needed for Wheezing 120 each 3    polyethylene glycol (GLYCOLAX) 17 GM/SCOOP powder Take 17 g by mouth daily as needed (constipation) 510 g 5    pantoprazole (PROTONIX) 40 MG tablet take 1 tablet by mouth once daily 90 tablet 1    zinc sulfate (ZINCATE) 220 (50 Zn) MG capsule Take 1 capsule by mouth daily 30 capsule 0    albuterol sulfate  (90 Base) MCG/ACT inhaler Inhale 2 puffs into the lungs every 6 hours as needed for Wheezing or Shortness of Breath 18 g 3    predniSONE (DELTASONE) 1 MG tablet Take 1 tablet by mouth daily - resume after completes decadron 60 tablet 2    levETIRAcetam (KEPPRA) 500 MG tablet take 1 tablet by mouth twice a day 240 tablet 2    loratadine (CLARITIN) 10 MG tablet take 1 tablet by mouth once daily 90 tablet 1    REFRESH PLUS 0.5 % SOLN ophthalmic solution use as directed      ondansetron (ZOFRAN-ODT) 4 MG disintegrating tablet Take 4 mg by mouth every 8 hours as needed      traZODone (DESYREL) 50 MG tablet take 1 tablet by mouth at bedtime if needed for sleep (Patient not taking: No sig reported) 30 tablet 5    melatonin 3 MG TABS tablet Take 1 tablet by mouth nightly as needed (insomnia) 30 tablet 3    Nutritional Supplements (ENSURE ORIGINAL) LIQD Take 1 Can by mouth 2 times daily 60 Bottle 5    Multiple Vitamins-Minerals (CENTRUM SILVER ADULT 50+) TABS Take 1 tablet by mouth daily 90 tablet 1       Allergies:  Bactrim [sulfamethoxazole-trimethoprim], Demerol, and Pcn [penicillins]    Social History:    reports that she has quit smoking. Her smoking use included cigarettes. She smoked an average of .5 packs per day. She has never used smokeless tobacco. She reports that she does not drink alcohol and does not use drugs.     Family History:       Problem Relation Age of Onset    Breast Cancer Sister 61        breast    Cancer Brother 79        lung    Cancer Brother 68        colon    Cancer Son 48        lung       Review of systems:    CNS:  as per HPI otherwise negative  CARDIOVASCULAR: No chest pain, dyspnea at rest or with activity, no orthopnea or  PND, no palpitations, no ankle edema  RESPIRATORY:  as per HPI otherwise negative  GASTROINTESTINAL SYSTEM:  as per HPI otherwise negative  GENITOURINARY SYSTEM: as per HPI otherwise negative  SKIN / Jovanna Simms:  No rashes, petechiae, , no open wounds , no soft tissue swelling   MUSCULOSKELETAL:  as per HPI otherwise negative  HEMATOLOGIC: No easy bruising, no bleeding  OPHTHALMOLOGY: No conjuctival injection, no discharge, no pain, no blurring of vision, no loss of vision, no diplopia  EAR: No loss of hearing , tinnitus, ear discharge , no ear pain          Vitals:   Vitals:    07/28/22 0750   BP: (!) 132/53   Pulse: 52   Resp: 16   Temp: 97.5 °F (36.4 °C)   SpO2: 98%      BMI: Body mass index is 26.56 kg/m². PHYSICAL EXAMINATION:          General appearance:  No apparent distress, appears stated age and cooperative. HEENT:  Normal cephalic, atraumatic without obvious deformity. Right eye prosthesis in place, poor vision in left eye, left pupil reactive to light   Neck: Supple  Respiratory:  clinically clear bilaterally  Cardiovascular:  Regular rhythm with normal S1/S2 without rubs or gallops. Abdomen:  abdominal scars noted, soft, non-tender, non-distended with normal bowel sounds. Musculoskeletal:  No clubbing, cyanosis  or edema bilaterally . Neurologic: Alert and oriented x3  motor strength 4/5 in left upper/ lower extremities.  5/5 RUE/RLE      Review of Labs and Diagnostic Testing:    Recent Results (from the past 24 hour(s))   EKG 12 Lead    Collection Time: 07/27/22  7:32 PM   Result Value Ref Range    Ventricular Rate 53 BPM    Atrial Rate 53 BPM    P-R Interval 146 ms    QRS Duration 80 ms    Q-T Interval 480 ms    QTc Calculation (Bazett) 450 ms    R Axis 2 degrees    T Axis 159 degrees   Basic Metabolic Panel w/ Reflex to MG    Collection Time: 07/27/22  8:07 PM   Result Value Ref Range    Sodium 142 135 - 145 meq/L    Potassium reflex Magnesium 4.8 3.5 - 5.2 meq/L    Chloride 105 98 - 111 meq/L    CO2 29 23 - 33 meq/L    Glucose 98 70 - 108 mg/dL    BUN 24 (H) 7 - 22 mg/dL    Creatinine 0.8 0.4 - 1.2 mg/dL    Calcium 9.2 8.5 - 10.5 mg/dL   Troponin    Collection Time: 07/27/22  8:07 PM   Result Value Ref Range    Troponin T < 0.010 ng/ml   Anion Gap    Collection Time: 07/27/22  8:07 PM   Result Value Ref Range    Anion Gap 8.0 8.0 - 16.0 meq/L   Glomerular Filtration Rate, Estimated    Collection Time: 07/27/22  8:07 PM   Result Value Ref Range    Est, Glom Filt Rate 68 (A) ml/min/1.73m2   Osmolality    Collection Time: 07/27/22  8:07 PM   Result Value Ref Range    Osmolality Calc 287.1 275.0 - 300.0 mOsmol/kg   CBC with Auto Differential    Collection Time: 07/27/22  8:08 PM   Result Value Ref Range    WBC 7.5 4.8 - 10.8 thou/mm3    RBC 4.05 (L) 4.20 - 5.40 mill/mm3    Hemoglobin 11.4 (L) 12.0 - 16.0 gm/dl    Hematocrit 38.6 37.0 - 47.0 %    MCV 95.3 81.0 - 99.0 fL    MCH 28.1 26.0 - 33.0 pg    MCHC 29.5 (L) 32.2 - 35.5 gm/dl    RDW-CV 15.6 (H) 11.5 - 14.5 %    RDW-SD 54.9 (H) 35.0 - 45.0 fL    Platelets 598 340 - 297 thou/mm3    MPV 12.1 9.4 - 12.4 fL    Seg Neutrophils 67.7 %    Lymphocytes 23.0 %    Monocytes 7.1 %    Eosinophils 1.7 %    Basophils 0.1 %    Immature Granulocytes 0.4 %    Segs Absolute 5.1 1.8 - 7.7 thou/mm3    Lymphocytes Absolute 1.7 1.0 - 4.8 thou/mm3    Monocytes Absolute 0.5 0.4 - 1.3 thou/mm3    Eosinophils Absolute 0.1 0.0 - 0.4 thou/mm3    Basophils Absolute 0.0 0.0 - 0.1 thou/mm3    Immature Grans (Abs) 0.03 0.00 - 0.07 thou/mm3    nRBC 0 /100 wbc   COVID-19, Rapid    Collection Time: 07/27/22  9:00 PM    Specimen: Nasopharyngeal Swab   Result Value Ref Range    SARS-CoV-2, NAAT NOT  DETECTED NOT DETECTED   Urine with Reflexed Micro    Collection Time: 07/27/22  9:56 PM   Result Value Ref Range    Glucose, Ur NEGATIVE NEGATIVE mg/dl    Bilirubin Urine NEGATIVE NEGATIVE    Ketones, Urine NEGATIVE NEGATIVE    Specific Gravity, Urine 1.019 1.002 - 1.030    Blood, Urine NEGATIVE NEGATIVE    pH, UA 7.5 5.0 - 9.0    Protein, UA NEGATIVE NEGATIVE    Urobilinogen, Urine 0.2 0.0 - 1.0 eu/dl    Nitrite, Urine NEGATIVE NEGATIVE    Leukocyte Esterase, Urine SMALL (A) NEGATIVE    Color, UA YELLOW STRAW-YELLOW    Character, Urine CLEAR CLEAR-SL CLOUD    RBC, UA 3-5 0-2/hpf /hpf    WBC, UA 2-4 0-4/hpf /hpf    Epithelial Cells, UA NONE SEEN 3-5/hpf /hpf    Bacteria, UA NONE SEEN FEW/NONE SEEN /hpf    Casts UA NONE SEEN NONE SEEN /lpf    Crystals, UA NONE SEEN NONE SEEN    Renal Epithelial, UA NONE SEEN NONE SEEN    Yeast, UA NONE SEEN NONE SEEN    CASTS 2 NONE SEEN NONE SEEN /lpf    MISCELLANEOUS 2 NONE SEEN    Comprehensive Metabolic Panel w/ Reflex to MG    Collection Time: 07/28/22  3:23 AM   Result Value Ref Range    Glucose 152 (H) 70 - 108 mg/dL    Creatinine 0.7 0.4 - 1.2 mg/dL    BUN 19 7 - 22 mg/dL    Sodium 142 135 - 145 meq/L    Potassium reflex Magnesium 4.5 3.5 - 5.2 meq/L    Chloride 106 98 - 111 meq/L    CO2 24 23 - 33 meq/L    Calcium 9.1 8.5 - 10.5 mg/dL    AST 18 5 - 40 U/L    Alkaline Phosphatase 44 38 - 126 U/L    Total Protein 6.0 (L) 6.1 - 8.0 g/dL    Albumin 3.5 3.5 - 5.1 g/dL    Total Bilirubin 0.3 0.3 - 1.2 mg/dL    ALT 9 (L) 11 - 66 U/L   CBC with Auto Differential    Collection Time: 07/28/22  3:23 AM   Result Value Ref Range    WBC 7.7 4.8 - 10.8 thou/mm3    RBC 4.05 (L) 4.20 - 5.40 mill/mm3    Hemoglobin 11.5 (L) 12.0 - 16.0 gm/dl    Hematocrit 38.2 37.0 - 47.0 %    MCV 94.3 81.0 - 99.0 fL    MCH 28.4 26.0 - 33.0 pg    MCHC 30.1 (L) 32.2 - 35.5 gm/dl    RDW-CV 15.6 (H) 11.5 - 14.5 %    RDW-SD 54.3 (H) 35.0 - 45.0 fL    Platelets 500 949 - 251 thou/mm3    MPV 12.4 9.4 - 12.4 fL    Seg Neutrophils 86.6 %    Lymphocytes 11.4 %    Monocytes 1.0 %    Eosinophils 0.3 %    Basophils 0.0 %    Immature Granulocytes 0.7 %    Platelet Estimate ADEQUATE Adequate    Segs Absolute 6.7 1.8 - 7.7 thou/mm3    Lymphocytes Absolute 0.9 (L) 1.0 - 4.8 thou/mm3    Monocytes Absolute 0.1 (L) 0.4 - 1.3 thou/mm3    Eosinophils Absolute 0.0 0.0 - 0.4 thou/mm3    Basophils Absolute 0.0 0.0 - 0.1 thou/mm3    Immature Grans (Abs) 0.05 0.00 - 0.07 thou/mm3    nRBC 0 /100 wbc   Anion Gap    Collection Time: 07/28/22  3:23 AM   Result Value Ref Range    Anion Gap 12.0 8.0 - 16.0 meq/L   Glomerular Filtration Rate, Estimated    Collection Time: 07/28/22  3:23 AM   Result Value Ref Range    Est, Glom Filt Rate 79 (A) ml/min/1.73m2   Scan of Blood Smear    Collection Time: 07/28/22  3:23 AM   Result Value Ref Range    SCAN OF BLOOD SMEAR see below        Radiology:     CT Head WO Contrast    Result Date: 7/27/2022  ** ADDENDUM #1 **This report was discussed with Philomena Batres RN on Jul 27, 2022 22:18:00 EDT. This document has been electronically signed by: Romeo Johnson on 07/27/2022 10:19 PM **ORIGINAL REPORT ** CT BRAIN WITHOUT CONTRAST COMPARISON: 5/23/2022. FINDINGS: Right-sided craniotomy changes are again noted, with decreased postoperative fluid underlying the craniotomy site. Round confluent areas of low attenuation are noted within the right parietal white matter and right temporal lobe which are new since the prior study. For example this measures approximately 2.8 x 2.5 cm in the right parietal lobe on axial image 21, and approximately 3.2 x 2.3 cm in the right temporal lobe on axial image 15. Additionally, there is significantly worsening confluent low attenuation involving the white matter of the right frontal lobe, right occipital lobe, right parietal lobe, and dorsal aspect of the right frontal lobe. Low attenuation also includes portions of the right external capsule and right internal capsule. Overall the findings are compatible with worsening neoplastic disease. New since the prior study, there is right to left midline shift at the level of the septum pellucidum on axial image 18, estimated to measure 5-6 mm. There is no hydrocephalus or downward herniation. There is no acute extra-axial blood. The right globe prosthesis is again noted.      1. There are 2 new round and confluent areas of low attenuation in the right parietal lobe and right temporal lobe, as detailed above. Worsening confluent white matter changes are noted within portions of right cerebral hemisphere, as detailed above. Findings are compatible with worsening neoplastic disease. 2. New since the prior study is approximately 5-6 mm of right-to-left midline shift at the level of the septum pellucidum. No hydrocephalus or downward herniation. 3. Postsurgical changes are again noted with decreasing postoperative fluid underlying the craniotomy site. 4. Neurosurgical consultation is advised. This document has been electronically signed by: Domenic Haas M.D. on 07/27/2022 10:14 PM All CTs at this facility use dose modulation techniques and iterative reconstructions, and/or weight-based dosing when appropriate to reduce radiation to a low as reasonably achievable. XR CHEST PORTABLE    Result Date: 7/27/2022  1 view chest x-ray Comparison: [id number] - XR CHEST PORTABLE - 07/06/2022 03:51 PM EDT Findings: No consolidation or effusion. Normal size heart. No acute fracture. 1. No acute findings. This document has been electronically signed by: Lucille Chaparro MD on 07/27/2022 08:51 PM        EKG: Sinus bradycardia  ST & T wave abnormality, consider anterolateral ischemia  Abnormal ECG  When compared with ECG of 06-JUL-2022 14:57,  Nonspecific T wave abnormality has replaced inverted T waves in Inferior leads  T wave inversion more evident in Anterolateral leads  Confirmed by Jr Salazar ([id number]) on 7/28/2022 7:48:56 AM      Principal Problem:    Breast cancer metastasized to brain, unspecified laterality ([address]  Resolved Problems:    * No resolved hospital problems. *      Plan:    Patient had been started on IV dexamethasone, cautious hydration, consultation to Radiation Oncology. The neurosurgical service had been contacted by the ED and had recommended palliative care for the patient.     Oncology consult had been placed to Dr. Taty Caballero and indicates that he will see the patient in the outpatient setting if she desires to pursue further treatment. Plan PT/OT and palliative care consultation    Pain management as indicated     Code status had been discussed by the ED physician with the patient while I was on the phone with him and the patient had desired no CPR, no shock no intubation in the event of a cardiopulmonary arrest.  Code status was made DNR CC - A.       DVT prophylaxis: [] Lovenox                                 [x] SCDs                                 [] SQ Heparin                                 [] Encourage ambulation, low risk for DVT, no chemical or mechanical prophylaxis necessary              [] Already on Anticoagulation                Anticipated Disposition upon discharge: [x] Home                                                                         [] Home with Home Health                                                                         [] [facility]                                                                         [] [address]          Electronically signed by Jasmin Martinez MD on 7/28/2022 at 10:30 AM

## 2022-07-28 NOTE — PROGRESS NOTES
session. Upon arrival patient was sitting up in bed. Pt was agreeable to OT session. Pain: 2/10: Head and Neck     Vitals: Vitals not assessed per clinical judgement, see nursing flowsheet    Social/Functional History:  Lives With: Family (Brother and Neice)  Type of Home: House  Home Layout: One level  Home Access: Stairs to enter without rails  Entrance Stairs - Number of Steps: 1 LEI  Home Equipment: Hospital bed, Rollator   Bathroom Shower/Tub: Tub/Shower unit, Shower chair with back  Bathroom Toilet: Handicap height  Bathroom Accessibility: Accessible    Receives Help From: Personal care attendant (Aides M-F for 2 hrs.)  ADL Assistance: Needs assistance (aides/family assists with dressing, showering, and toileting.)  Homemaking Assistance: Needs assistance  Homemaking Responsibilities: No  Ambulation Assistance: Independent  Transfer Assistance: Independent    Active : No  Patient's  Info: Family drives PRN        VISION: Pt is legally blind and only able to see shadows. HEARING:  WFL    COGNITION: Slow Processing, Decreased Recall, Decreased Problem Solving, and Decreased Safety Awareness    RANGE OF MOTION:  Bilateral Upper Extremity:  WFL    STRENGTH:  Bilateral Upper Extremity:  Impaired - deconditioned    ADL:   Lower Extremity Dressing: Maximum Assistance. To don B Socks . BALANCE:  Sitting Balance:  Stand By Assistance. Seated EOB. Standing Balance: Contact Guard Assistance. BED MOBILITY:  Supine to Sit: Minimal Assistance, X 1, with head of bed raised, with rail, with increased time for completion    Scooting: [employer], with rail, with increased time for completion      TRANSFERS:  Sit to Stand:  [employer], X 1, with increased time for completion, cues for hand placement. Stand to Sit: [employer], Dependent, with increased time for completion, cues for hand placement.       FUNCTIONAL MOBILITY:  Assistive Device: Rolling Recommendations:  Equipment Needed: No    Plan:  Times per Week: 3-5x  Current Treatment Recommendations: Strengthening, Balance training, Functional mobility training, Endurance training, Safety education & training, Patient/Caregiver education & training, Equipment evaluation, education, & procurement, Self-Care / ADL. See long-term goal time frame for expected duration of plan of care. If no long-term goals established, a short length of stay is anticipated. Goals:  Patient goals : Go Home with family  Short Term Goals  Time Frame for Short term goals: Until discharge  Short Term Goal 1: Pt will complete BUE light resistive exercises with min vcs for technique to increase indep and endurance with transfers and self cares. Short Term Goal 2: Pt will complete standing tolerance x 3 minutes with 1 UE release and SBA to increase indep and endurance with sinkside grooming. Short Term Goal 3: Pt will complete functional mobility to/from BR and HH distances with SBA to increase indep with self cares. Short Term Goal 4: Pt will complete UB dressing with SBA to increase indep and endurance within home environment. Additional Goals?: No         Following session, patient left in safe position with all fall risk precautions in place.

## 2022-07-28 NOTE — ED NOTES
Pt resting in bed quietly. Pt denies any pain at this time. Pt updated on POC, verbalized understanding. Call light in reach.       Keysha Field RN  07/27/22 [id number]

## 2022-07-28 NOTE — CONSULTS
[facility]  Hematology/Oncology  Consult Note      Reason for Consult:  Metastatic breast cancer to brain  Requesting Physician:  Rut Boateng MD     CHIEF COMPLAINT:  Fatigue    History Obtained From:  Patient, electronic medical record    HISTORY OF PRESENT ILLNESS:      The patient is a 80 y.o. female who presented to the hospital with a chief complaint of fatigue x1 week. She has a history of breast cancer in May 2016 and was previously seen by Dr. Ilya Randolph. She is s/p chemotherapy. 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 A PET scan on 5/16/16 showed FDG avid soft tissue density in the lateral left breast highly suspicious for malignancy. Multiple FDG avid hypoattenuating liver lesions consistent with known metastatic disease. Indistinct FDG avid soft tissue foci in the sigmoid colon and at the rectosigmoid junction, also suspicious for malignancy. Biopsy of liver mass on 5/10/16 showed metastatic adenocarcinoma, favor breast primary. She underwent biopsy of a left breast mass on 5/23/16 that showed: Invasive poorly differentiated ductal adenocarcinoma, ER- 40% positive, FL- 3% positive, Ki-67 90%, and Her2-lacie amplified. CA 27.29 elevated at 141. She is s/p sigmoid colon resection for colon mass with diverting colostomy on 5/27/16, colon path negative (colostomy reversed on 9/4/18). She was started on treatment per Dr. Ilya Randolph with Taxotere on 6/20/16 and added weekly Herceptin on 6/27/16. She started Perjeta on 10/17/16. Per Dr. Tan العلي notes, she had an intermission in treatment and was restarted on Perjeta + Herceptin on 7/11/19. She was started on treatment with Enhertu on 8/10/21 after review of pathology from craniotomy on 7/2/21. No documentation of how long she was on treatment with Enhertu. She has brain metastasis.  An MRI brain on 6/28/21 showed a heterogeneous 5.5 cm nodular peripherally enhancing cystic and solid mass centered at the right occipital/temporal lobe junction with prominent adjacent T2/FLAIR prolongation involving the corpus callosum, temporal, parietal and frontal lobes with prominent mass effect on the right lateral ventricle and 9 mm leftward midline shift. 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 She underwent initial debulking craniotomy with Dr. Fern Bo on 7/2/21 and pathology showed metastatic carcinoma c/w breast primary. ER- negative, TN- negative, Her2-lacie positive (3+). Restaging CT Chest/Abdomen/Pelvis on 7/8/21 showed no evidence of metastatic disease in the chest, abdomen and pelvis. 7 mm hypodense focus in the body of the pancreas may represent IPMN. Mild colonic diverticulosis. Moderate retained stool  She had a repeat MRI brain on 5/6/22 that showed a large irregular enhancing cystic mass on the right with surrounding edema. Primary considerations would be recurrent malignancy versus abscess. Persistent linear enhancement along the meninges anterior left parietal lobe. She is s/p craniotomy on 5/11/22 with Dr. Fern Bo and surgical pathology showed: Metastatic carcinoma consistent with breast primary. An MRI brain post-op on 6/3/22 showed: Postoperative changes in the right posterior temporal/parietal region. Large cavitary lesion at the surgical site measuring 4.3 x 3.5 x 2.8 cm in size significantly larger than on previous study. Extra-axial fluid collection at the surgical site measuring 4.9 x 0.8 cm in size. Abnormal enhancement along the anterior aspect of the cavity and extensive surrounding edema suggestive of tumor. There is mass effect upon the right lateral ventricle. Mild atrophy and dilatation of the third and lateral ventricles. At this time XRT was recommended, in which the patient was reluctant. She did not choose to follow-up in the office and now presented to the ED on 7/27/22 with c/o fatigue and headaches.  CT Head on 7/27/22 showed 2 new round and confluent areas of low attenuation in the right parietal lobe and right temporal lobe, as detailed above. Worsening confluent white matter changes are noted within portions of right cerebral hemisphere, as detailed above. Findings are compatible with worsening neoplastic disease. New since the prior study is approximately 5-6 mm of right-to-left midline shift at the level of the septum pellucidum. No hydrocephalus or downward herniation. Postsurgical changes are again noted with decreasing postoperative fluid underlying the craniotomy site. Radiation oncology has been consulted. She is sitting up in the chair in no apparent distress. She just finished working with therapy. Denies chest pain. Denies shortness of breath. C/o wheezing, she uses breathing treatments at home. Denies abdominal pain, nausea, vomiting. C/o fatigue and weakness. C/o headaches. She has poor vision.     Past Medical History:          Diagnosis Date    Anxiety     Blind right eye     Breast cancer ([facility] [address] 05/23/2016    IDC @ [facility] only chemo, no surgery    Breast cancer metastasized to liver (5/16) and brain (7/21) ([facility] [address] 05/10/2016    Liver lesion noted 5/16 : Brain 7/21    Carotid stenosis      Left ICA 25%    Colostomy in place [facility]) 05/27/2016    Degenerative arthritis of cervical spine 05/2007    GERD (gastroesophageal reflux disease) 11/2006    History of craniotomy 07/2021    mets from breast CA    Hx antineoplastic chemo 2016    left breast cancer, no surgery    Hypercholesteremia     Hypoestrogenism     Hypothyroidism     Lumbago     Lumbosacral spinal stenosis 05/2019    MDRO (multiple drug resistant organisms) resistance 2008    pt stated cleared    Metastasis ([address] 2019    from breast to brain    Personal history of cardiac dysrhythmia     Respiratory tract infection due to COVID-19 virus 01/21/2022    Sigmoid diverticulosis 08/2004    Spondylolisthesis of lumbosacral region 08/2004    Steroid-induced hyperglycemia     Subclavian artery stenosis (HCC)     left    Superior and Inferior Pubic ramus (ZYRTEC) tablet 5 mg, 5 mg, Oral, Daily  melatonin tablet 3 mg, 3 mg, Oral, Nightly PRN  metoprolol tartrate (LOPRESSOR) tablet 12.5 mg, 12.5 mg, Oral, BID  midodrine (PROAMATINE) tablet 2.5 mg, 2.5 mg, Oral, TID WC  multivitamin 1 tablet, 1 tablet, Oral, Daily  pantoprazole (PROTONIX) tablet 40 mg, 40 mg, Oral, QAM AC  polyethylene glycol (GLYCOLAX) packet 17 g, 17 g, Oral, Daily PRN  polyethyl glycol-propyl glycol 0.4-0.3 % (SYSTANE) ophthalmic solution 1 drop, 1 drop, Both Eyes, 4x Daily  sertraline (ZOLOFT) tablet 50 mg, 50 mg, Oral, BID  atorvastatin (LIPITOR) tablet 10 mg, 10 mg, Oral, Nightly  traZODone (DESYREL) tablet 50 mg, 50 mg, Oral, Nightly PRN  [START ON 8/2/2022] vitamin D (ERGOCALCIFEROL) capsule 50,000 Units, 50,000 Units, Oral, Weekly  zinc sulfate (ZINCATE) capsule 50 mg, 50 mg, Oral, Daily  sodium chloride flush 0.9 % injection 5-40 mL, 5-40 mL, IntraVENous, 2 times per day  sodium chloride flush 0.9 % injection 5-40 mL, 5-40 mL, IntraVENous, PRN  0.9 % sodium chloride infusion, , IntraVENous, PRN  ondansetron (ZOFRAN-ODT) disintegrating tablet 4 mg, 4 mg, Oral, Q8H PRN **OR** ondansetron (ZOFRAN) injection 4 mg, 4 mg, IntraVENous, Q6H PRN  acetaminophen (TYLENOL) tablet 650 mg, 650 mg, Oral, Q6H PRN **OR** acetaminophen (TYLENOL) suppository 650 mg, 650 mg, Rectal, Q6H PRN  dexamethasone (DECADRON) injection 4 mg, 4 mg, IntraVENous, Q6H  insulin lispro (HUMALOG) injection vial 0-4 Units, 0-4 Units, SubCUTAneous, TID WC  insulin lispro (HUMALOG) injection vial 0-4 Units, 0-4 Units, SubCUTAneous, Nightly  0.9 % sodium chloride infusion, , IntraVENous, Continuous  glucose chewable tablet 16 g, 4 tablet, Oral, PRN  dextrose bolus 10% 125 mL, 125 mL, IntraVENous, PRN **OR** dextrose bolus 10% 250 mL, 250 mL, IntraVENous, PRN  glucagon (rDNA) injection 1 mg, 1 mg, SubCUTAneous, PRN  dextrose 10 % infusion, , IntraVENous, Continuous PRN    Allergies:  Bactrim [sulfamethoxazole-trimethoprim], Demerol, and Pcn [penicillins]    Family History:     Family History   Problem Relation Age of Onset    Breast Cancer Sister 61        breast    Cancer Brother 79        lung    Cancer Brother 68        colon    Cancer Son 48        lung        Vitals:      BP (!) 132/53   Pulse 52   Temp 97.5 °F (36.4 °C) (Oral)   Resp 16   Ht 5' (1.524 m)   Wt 136 lb (61.7 kg)   LMP  (LMP Unknown) Comment: age 52 ovaries intact  SpO2 98%   BMI 26.56 kg/m²     REVIEW OF SYSTEMS:      Review of Systems   Constitutional:  Positive for fatigue. Negative for activity change, appetite change, fever and unexpected weight change. HENT:  Negative for hearing loss and mouth sores. Eyes:  Positive for visual disturbance. Respiratory:  Positive for wheezing. Negative for apnea, cough, chest tightness and shortness of breath. Cardiovascular:  Negative for chest pain, palpitations and leg swelling. Gastrointestinal:  Negative for abdominal distention, abdominal pain, blood in stool, constipation, diarrhea, nausea and vomiting. Genitourinary:  Negative for dysuria, hematuria and urgency. Musculoskeletal:  Negative for arthralgias, back pain and myalgias. Skin:  Negative for color change and pallor. Neurological:  Positive for dizziness, weakness and light-headedness. Negative for numbness and headaches. Patient reports some memory loss   Hematological:  Negative for adenopathy. Does not bruise/bleed easily. Psychiatric/Behavioral:  Negative for dysphoric mood and sleep disturbance. The patient is not nervous/anxious. PHYSICAL EXAM:      ECOG Performance Status (ECOG Scale 0-4): Score 1:  Patient is restricted in physically strenuous activity but ambulatory and able to carry out work of light or sedentary nature (e.g. light house work, office work). Physical Exam  Vitals reviewed. Constitutional:       General: She is not in acute distress. Appearance: Normal appearance.  She is not ill-appearing or toxic-appearing. HENT:      Head: Normocephalic. Comments: Returning hair growth     Nose: Nose normal.      Mouth/Throat:      Mouth: Mucous membranes are moist.      Pharynx: Oropharynx is clear. Eyes:      General: No scleral icterus. Pupils: Pupils are equal, round, and reactive to light. Cardiovascular:      Rate and Rhythm: Normal rate and regular rhythm. Pulmonary:      Effort: Pulmonary effort is normal. No respiratory distress. Breath sounds: Wheezing present. Abdominal:      General: There is no distension. Palpations: Abdomen is soft. Tenderness: There is no abdominal tenderness. There is no guarding. Musculoskeletal:         General: Normal range of motion. Cervical back: Neck supple. Right lower leg: No edema. Left lower leg: No edema. Lymphadenopathy:      Cervical: No cervical adenopathy. Skin:     General: Skin is warm and dry. Findings: Bruising (generalized) present. Neurological:      General: No focal deficit present. Mental Status: She is alert and oriented to person, place, and time. Mental status is at baseline. Motor: Weakness present. Comments: Some difficulty with recollection of memory   Psychiatric:         Mood and Affect: Mood normal.         Thought Content: Thought content normal.         Judgment: Judgment normal.       LABS:    CBC  Recent Labs     07/27/22 2008 07/28/22  0323   WBC 7.5 7.7   RBC 4.05* 4.05*   HGB 11.4* 11.5*   HCT 38.6 38.2   MCV 95.3 94.3   MCH 28.1 28.4   MCHC 29.5* 30.1*    140   MPV 12.1 12.4      BMP  Recent Labs     07/27/22 2007 07/28/22  0323    142   K 4.8 4.5    106   CO2 29 24   BUN 24* 19   CREATININE 0.8 0.7   GLUCOSE 98 152*   CALCIUM 9.2 9.1     LFT  Recent Labs     07/28/22  0323   AST 18   ALT 9*   BILITOT 0.3   ALKPHOS 44     TUMOR MARKERS    CA 27.29: 16 (2/21/22)    IMAGING:    CT Head WO Contrast    Result Date: 7/27/2022  1.  There are 2 new round and confluent areas of low attenuation in the right parietal lobe and right temporal lobe, as detailed above. Worsening confluent white matter changes are noted within portions of right cerebral hemisphere, as detailed above. Findings are compatible with worsening neoplastic disease. 2. New since the prior study is approximately 5-6 mm of right-to-left midline shift at the level of the septum pellucidum. No hydrocephalus or downward herniation. 3. Postsurgical changes are again noted with decreasing postoperative fluid underlying the craniotomy site. 4. Neurosurgical consultation is advised. This document has been electronically signed by: Sommer Walton M.D. on 07/27/2022 10:14 PM All CTs at this facility use dose modulation techniques and iterative reconstructions, and/or weight-based dosing when appropriate to reduce radiation to a low as reasonably achievable. XR CHEST PORTABLE    Result Date: 7/27/2022  1. No acute findings. This document has been electronically signed by: Bruno Zepeda MD on 07/27/2022 08:51 PM       IMPRESSION/RECOMMENDATIONS:      For Metastatic Breast Cancer to brain-  - Radiation oncology consulted, note reviewed. Plan for palliative radiation therapy treating intracranial disease following MRI brain, may start inpatient if extended stay is expected. - Patient states she would like to proceed with treatment. She is of advanced age, although functional status is adequate. Palliative care consulted. - Consider Herceptin + Perjeta + antihormonal therapy (pending new ER, FL, and Her2-lacie status). Called and discussed with pathology department and ordered further testing of ER, FL,and Her2-lacie testing on surgical specimen from 5/11/22. - Echocardiogram on 5/12/22 showed EF 65-70%.  Okay for Herceptin therapy if warranted  - Do not recommend any further surgery with Stage IV disease  - Continue Decadron 4mg every 6 hours, will switch to po  - Plan to follow-up outpatient after discharge to discuss treatment plan. She will require restaging PET scan outpatient. ** Attempted to call patient's brother Consuelo Reeves) to update on plan per patient request. Called phone number in chart on 2 occasions with no answer. Updated RN if patient calls/visits, he can call our office for update.     Electronically signed by PAMELA Sosa CNP on 7/28/2022 at 10:14 AM

## 2022-07-29 ENCOUNTER — APPOINTMENT (OUTPATIENT)
Dept: MRI IMAGING | Age: 87
DRG: 054 | End: 2022-07-29
Payer: MEDICARE

## 2022-07-29 LAB
GLUCOSE BLD-MCNC: 114 MG/DL (ref 70–108)
GLUCOSE BLD-MCNC: 133 MG/DL (ref 70–108)
GLUCOSE BLD-MCNC: 146 MG/DL (ref 70–108)
GLUCOSE BLD-MCNC: 281 MG/DL (ref 70–108)

## 2022-07-29 PROCEDURE — 86300 IMMUNOASSAY TUMOR CA 15-3: CPT

## 2022-07-29 PROCEDURE — 6360000004 HC RX CONTRAST MEDICATION: Performed by: RADIOLOGY

## 2022-07-29 PROCEDURE — 97110 THERAPEUTIC EXERCISES: CPT

## 2022-07-29 PROCEDURE — 82948 REAGENT STRIP/BLOOD GLUCOSE: CPT

## 2022-07-29 PROCEDURE — 6360000002 HC RX W HCPCS: Performed by: NURSE PRACTITIONER

## 2022-07-29 PROCEDURE — 6370000000 HC RX 637 (ALT 250 FOR IP): Performed by: INTERNAL MEDICINE

## 2022-07-29 PROCEDURE — 70553 MRI BRAIN STEM W/O & W/DYE: CPT

## 2022-07-29 PROCEDURE — 2140000000 HC CCU INTERMEDIATE R&B

## 2022-07-29 PROCEDURE — A9579 GAD-BASE MR CONTRAST NOS,1ML: HCPCS | Performed by: RADIOLOGY

## 2022-07-29 PROCEDURE — 2580000003 HC RX 258: Performed by: INTERNAL MEDICINE

## 2022-07-29 PROCEDURE — 36415 COLL VENOUS BLD VENIPUNCTURE: CPT

## 2022-07-29 RX ADMIN — DEXAMETHASONE SODIUM PHOSPHATE 4 MG: 4 INJECTION, SOLUTION INTRA-ARTICULAR; INTRALESIONAL; INTRAMUSCULAR; INTRAVENOUS; SOFT TISSUE at 11:18

## 2022-07-29 RX ADMIN — Medication 1 TABLET: at 17:09

## 2022-07-29 RX ADMIN — PANTOPRAZOLE SODIUM 40 MG: 40 TABLET, DELAYED RELEASE ORAL at 06:28

## 2022-07-29 RX ADMIN — ATORVASTATIN CALCIUM 10 MG: 10 TABLET, FILM COATED ORAL at 19:56

## 2022-07-29 RX ADMIN — Medication 50 MG: at 11:18

## 2022-07-29 RX ADMIN — DOCUSATE SODIUM 100 MG: 100 CAPSULE, LIQUID FILLED ORAL at 11:18

## 2022-07-29 RX ADMIN — Medication 1 TABLET: at 19:56

## 2022-07-29 RX ADMIN — SODIUM CHLORIDE, PRESERVATIVE FREE 10 ML: 5 INJECTION INTRAVENOUS at 19:57

## 2022-07-29 RX ADMIN — SERTRALINE 50 MG: 50 TABLET, FILM COATED ORAL at 19:56

## 2022-07-29 RX ADMIN — TRAZODONE HYDROCHLORIDE 50 MG: 50 TABLET ORAL at 19:56

## 2022-07-29 RX ADMIN — ISOSORBIDE MONONITRATE 30 MG: 30 TABLET, EXTENDED RELEASE ORAL at 17:09

## 2022-07-29 RX ADMIN — LEVOTHYROXINE SODIUM 100 MCG: 0.1 TABLET ORAL at 06:28

## 2022-07-29 RX ADMIN — CETIRIZINE HYDROCHLORIDE 5 MG: 10 TABLET, FILM COATED ORAL at 11:18

## 2022-07-29 RX ADMIN — Medication 1 TABLET: at 11:19

## 2022-07-29 RX ADMIN — DEXAMETHASONE SODIUM PHOSPHATE 4 MG: 4 INJECTION, SOLUTION INTRA-ARTICULAR; INTRALESIONAL; INTRAMUSCULAR; INTRAVENOUS; SOFT TISSUE at 18:45

## 2022-07-29 RX ADMIN — Medication 3 MG: at 19:57

## 2022-07-29 RX ADMIN — DEXAMETHASONE SODIUM PHOSPHATE 4 MG: 4 INJECTION, SOLUTION INTRA-ARTICULAR; INTRALESIONAL; INTRAMUSCULAR; INTRAVENOUS; SOFT TISSUE at 01:28

## 2022-07-29 RX ADMIN — LEVETIRACETAM 500 MG: 500 TABLET, FILM COATED ORAL at 19:56

## 2022-07-29 RX ADMIN — ALPRAZOLAM 0.25 MG: 0.25 TABLET ORAL at 19:57

## 2022-07-29 RX ADMIN — LEVETIRACETAM 500 MG: 500 TABLET, FILM COATED ORAL at 11:18

## 2022-07-29 RX ADMIN — DEXAMETHASONE SODIUM PHOSPHATE 4 MG: 4 INJECTION, SOLUTION INTRA-ARTICULAR; INTRALESIONAL; INTRAMUSCULAR; INTRAVENOUS; SOFT TISSUE at 06:28

## 2022-07-29 RX ADMIN — GADOTERIDOL 10 ML: 279.3 INJECTION, SOLUTION INTRAVENOUS at 14:52

## 2022-07-29 ASSESSMENT — PAIN SCALES - GENERAL: PAINLEVEL_OUTOF10: 0

## 2022-07-29 NOTE — CARE COORDINATION
DISCHARGE/PLANNING EVALUATION  7/29/22, 8:35 AM EDT    Reason for Referral: Current with [facility]  Mental Status: Answered questions appropriately. Some forgetfulness at times. Decision Making: Family helps with decision making as needed. Family/Social/Home Environment: Lives at home. She has nieces that take turns staying with her. Her brother Carole Reeder is her primary contact. She reports that he lives in [place]. Current Services including food security, transportation and housekeeping: Spoke with Raquel at [facility]. She is current with them for weekly nursing and aid services 2-3 hours a week M-F. They will need new home health orders for RN, PT, OT and aid services. [employer]port [facility]. Jessica Barber reports that she has ERS, home delivered meals and [facility]. Current Equipment:rollator, shower chair  Payment Source:Medicare and Medicaid  Concerns or Barriers to Discharge: Unsure if family is at the home all of the time. Post acute provider list with quality measures, geographic area and applicable managed care information provided. Questions regarding selection process answered:Current with [facility]. Teach Back Method used with Latanya regarding care plan and discharge planning. Patient verbalize understanding of the plan of care and contribute to goal setting. Patient goals, treatment preferences and discharge plan: Patient will return home with [employer] services and [facility] for RN, PT, OT and aid. [facility]. Electronically signed by MIGEL Henderson on 7/29/2022 at 8:35 AM    Update 3:24pm: Spoke with Latanya. Made her aware therapy is recommending 24/7 care and family may not be able to do it. Told her she may need to start thinking about ECF placement. She told SW that she will talk to her family over the weekend.

## 2022-07-29 NOTE — FLOWSHEET NOTE
Pt is an 80y.o. female in 4B-34.  visited due to a Consult entered, re: HCPOA changes desired. Latanya appeared mildly agitated and confused about another patient she hears moaning, thus not fully oriented. She did briefly share she wants to change her Agent but returned to confusion about other patient. Agreed to wait until tomorrow to address.      07/28/22 2207   Encounter Summary   Encounter Overview/Reason  Advance Care Planning   Service Provided For: Patient   Referral/Consult From: Multi-disciplinary team   Support System Children;Family members   Last Encounter  07/28/22   Complexity of Encounter Low   Begin Time 2040   End Time  2045   Total Time Calculated 5 min   Advance Care Planning   Type Refused ACP conversation  (Wants to discuss HCPOA Friday instead of today.)

## 2022-07-29 NOTE — PROGRESS NOTES
[address] [place] [address]  Occupational Therapy  Daily Note  Time:   Time In: 99  Time Out: 1140  Timed Code Treatment Minutes: 15 Minutes  Minutes: 15          Date: 2022  Patient Name: Anton Blount,   Gender: female      Room: -32/032-A  MRN: [id number]  : 1933  (80 y.o.)  Referring Practitioner: Gillian Downs MD  Diagnosis: Generalized Weakness  Additional Pertinent Hx: The patient is a 80 y.o. 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 She had been treated with chemotherapy but no surgery for the left breast CA, she however did undergo craniotomy for resection / debulking of the right-sided intracranial metastatic tumor on . She has a history of GERD, DJD, HLD, hypothyroidism, MDRO resistance. She had presented to the emergency room with generalized weakness over a period of a week, she  has had some mild right-sided headaches and complained of feeling lightheaded, she at times feels like she may pass out. She complained of generalized fatigue no rhinorrhea nasal congestion or sore throat and has had some nausea without vomiting. There has been no fever or chills. There has been no dysuria material flank pains or urgency. Head CT scan obtained in the ED had shown 2 new round and confluent areas of low attenuation in the right parietal lobe and right temporal lobe,  worsening confluent white matter changes are noted within portions of right cerebral hemisphere, findings are compatible with worsening neoplastic disease. Also new since the prior study is a 5-6 mm right to left midline shift in the level of the septum pellucidum. \"    Restrictions/Precautions:  Restrictions/Precautions: Fall Risk, General Precautions      SUBJECTIVE: Pt reclined in bedside chair on arrival, just finishing breakfast. Pt agreeable to OT session, significantly confused throughout.  Pt agreeable to exercises only, declined all ADLs and mobility. PAIN: 0/10:     Vitals: Vitals not assessed per clinical judgement, see nursing flowsheet    COGNITION: Slow Processing, Decreased Recall, Decreased Insight, Impaired Memory, Inattention, Decreased Problem Solving, Decreased Safety Awareness, Impaired Attention, and Tangential  Pt oriented to person, partially to place as initially aware she is in [place] [id number], then later refers to being at home multiple times. Pt asking if \"kitchen has been mopped, and needing to throw blankets in the laundry. \"     ADL:   No ADL's completed this session. .Pt declined completing at this time despite encouragement. BALANCE:  Sitting Balance:  Supervision. While seated in bedside chair. Minimal cues to correct R lateral lean and reposition. .hep    ADDITIONAL ACTIVITIES:  Pt completed BUE AROM exercises while seated in chair. Pt completed 1 set X 15 repetitions in all planes with short rest breaks in between variations. Pt declined progression of exercises to use of min resistance band. Exercises completed to increase overall strength/endurance needed for ADLs and transfers. ASSESSMENT:     Activity Tolerance:  Patient tolerance of  treatment: fair.         Discharge Recommendations: 24 hour assistance or supervision and Home with Home Health OT  Equipment Recommendations: Equipment Needed: No  Plan: Times per Week: 3-5x  Current Treatment Recommendations: Strengthening, Balance training, Functional mobility training, Endurance training, Safety education & training, Patient/Caregiver education & training, Equipment evaluation, education, & procurement, Self-Care / ADL    Patient Education  Patient Education: Plan of Care, Home Exercise Program, and Importance of Increasing Activity    Goals  Short Term Goals  Time Frame for Short term goals: Until discharge  Short Term Goal 1: Pt will complete BUE light resistive exercises with min vcs for technique to increase indep and endurance with transfers and self cares. Short Term Goal 2: Pt will complete standing tolerance x 3 minutes with 1 UE release and SBA to increase indep and endurance with sinkside grooming. Short Term Goal 3: Pt will complete functional mobility to/from BR and HH distances with SBA to increase indep with self cares. Short Term Goal 4: Pt will complete UB dressing with SBA to increase indep and endurance within home environment. Additional Goals?: No    Following session, patient left in safe position with all fall risk precautions in place.

## 2022-07-29 NOTE — CARE COORDINATION
7/29/22, 1:17 PM EDT    DISCHARGE ON GOING EVALUATION    [facility] day: 2  Location: -32/032-A Reason for admit: Generalized weakness [R53.1]  Brain mass [G93.89]  Breast cancer metastasized to brain, unspecified laterality ([facility] [address] [C50.919, C79.31]   Procedure:   7/27 CT Head: 1. There are 2 new round and confluent areas of low attenuation in the right parietal lobe and right temporal lobe, as detailed above. Worsening confluent white matter changes are noted within portions of right cerebral hemisphere. Findings are compatible with worsening neoplastic disease. New since the prior study is approximately 5-6 mm of right-to-left midline shift at the level of the septum pellucidum. No hydrocephalus or downward herniation. Postsurgical changes are again noted with decreasing postoperative fluid underlying the craniotomy site. 4. Neurosurgical consultation is advised. Barriers to Discharge: IM, Neurosurgery, Oncology and Radiation Oncology following. Palliative Care consulted, limited code x4. PT/OT. IVF. Decadron 4mg po q6hr. PCP: Kathy Martins MD  Readmission Risk Score: 26.2%  Patient Goals/Plan/Treatment Preferences: Plans home with family. Current with [facility] and [facility]. SW following.

## 2022-07-29 NOTE — PROGRESS NOTES
INTERNAL MEDICINE SPECIALTIES  Progress Note For Dr Alfonso Castillo       Patient:  Reddy Garcia  YOB: 1933  Date of Service: 7/29/2022  MRN: [id number]   Acct:  [de-identified]   Primary Care Physician: Sammy Mccormack MD    SUBJECTIVE: No new complaints    Home Medications:   No current facility-administered medications on file prior to encounter. Current Outpatient Medications on File Prior to Encounter   Medication Sig Dispense Refill    amiodarone (CORDARONE) 200 MG tablet Take 1 tablet by mouth in the morning.  30 tablet 0    midodrine (PROAMATINE) 2.5 MG tablet Take 1 tablet by mouth 3 times daily 90 tablet 3    sertraline (ZOLOFT) 50 MG tablet take 1 tablet by mouth twice a day 180 tablet 1    Calcium Carbonate-Vitamin D (OYSTER SHELL CALCIUM/D) 500-200 MG-UNIT TABS take 1 tablet by mouth twice a day 180 tablet 3    RA BIOTIN 1000 MCG TABS Take 1,000 mcg by mouth 2 times daily 60 tablet 5    vitamin D (ERGOCALCIFEROL) 1.25 MG (31872 UT) CAPS capsule take 1 capsule by mouth every week 5 capsule 5    metoprolol tartrate (LOPRESSOR) 25 MG tablet Take 0.5 tablets by mouth 2 times daily 60 tablet 3    levothyroxine (SYNTHROID) 100 MCG tablet take 1 tablet by mouth once daily 90 tablet 0    ALPRAZolam (XANAX) 0.25 MG tablet take 1 tablet by mouth twice a day if needed for anxiety 60 tablet 2    isosorbide mononitrate (IMDUR) 30 MG extended release tablet take 1 tablet by mouth once daily 90 tablet 1    simvastatin (ZOCOR) 20 MG tablet take 1 tablet by mouth every evening 90 tablet 1    docusate (COLACE, DULCOLAX) 100 MG CAPS Take 100 mg by mouth 2 times daily 60 capsule 1    Nebulizers (COMPRESSOR/NEBULIZER) MISC 1 Device by Does not apply route 4 times daily as needed (Shortness of breath and wheezing) 1 each 3    albuterol (PROVENTIL) (2.5 MG/3ML) 0.083% nebulizer solution Take 3 mLs by nebulization every 6 hours as needed for Wheezing 120 each 3    polyethylene glycol (GLYCOLAX) 17 GM/SCOOP powder flush  5-40 mL IntraVENous 2 times per day    insulin lispro  0-4 Units SubCUTAneous TID WC    insulin lispro  0-4 Units SubCUTAneous Nightly    dexamethasone  4 mg Oral Q6H     Continuous Infusions:   sodium chloride      sodium chloride 40 mL/hr at 07/28/22 0141    dextrose       PRN Meds:albuterol, ALPRAZolam, melatonin, polyethylene glycol, traZODone, sodium chloride flush, sodium chloride, ondansetron **OR** ondansetron, acetaminophen **OR** acetaminophen, glucose, dextrose bolus **OR** dextrose bolus, glucagon (rDNA), dextrose        Allergies:  Bactrim [sulfamethoxazole-trimethoprim], Demerol, and Pcn [penicillins]    OBJECTIVE:    Vitals:   Vitals:    07/29/22 1205   BP: (!) 146/50   Pulse: 52   Resp: 16   Temp: 98.4 °F (36.9 °C)   SpO2: 97%      BMI: Body mass index is 26.99 kg/m². PHYSICAL EXAMINATION:            General appearance:  No apparent distress, appears stated age and cooperative. HEENT:  Normal cephalic, atraumatic without obvious deformity. 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 Abdomen:  abdominal scars noted, soft, non-tender, non-distended with normal bowel sounds. Musculoskeletal:  No clubbing, cyanosis  or edema bilaterally . Neurologic: Alert and oriented x3  motor strength 4/5 in left upper/ lower extremities.  5/5 RUE/RLE         Review of Labs and Diagnostic Testing:    Recent Results (from the past 24 hour(s))   POCT glucose    Collection Time: 07/28/22  4:29 PM   Result Value Ref Range    POC Glucose 120 (H) 70 - 108 mg/dl   POCT glucose    Collection Time: 07/28/22  7:54 PM   Result Value Ref Range    POC Glucose 136 (H) 70 - 108 mg/dl   POCT glucose    Collection Time: 07/29/22  7:37 AM   Result Value Ref Range    POC Glucose 146 (H) 70 - 108 mg/dl   POCT glucose    Collection Time: 07/29/22 11:41 AM   Result Value Ref Range    POC Glucose 281 (H) 70 - 108 mg/dl       Radiology:     CT Head WO Contrast    Result Date: 7/27/2022  **ADDENDUM #1 **  This report was discussed with Nelia Garcia RN on Jul 27, 2022 22:18:00 EDT. This document has been electronically signed by: Alba Thomas on 07/27/2022 10:19 PM ** ORIGINAL REPORT **  CT BRAIN WITHOUT CONTRAST COMPARISON: 5/23/2022. FINDINGS: Right-sided craniotomy changes are again noted, with decreased postoperative fluid underlying the craniotomy site. Round confluent areas of low attenuation are noted within the right parietal white matter and right temporal lobe which are new since the prior study. For example this measures approximately 2.8 x 2.5 cm in the right parietal lobe on axial image 21, and approximately 3.2 x 2.3 cm in the right temporal lobe on axial image 15. Additionally, there is significantly worsening confluent low attenuation involving the white matter of the right frontal lobe, right occipital lobe, right parietal lobe, and dorsal aspect of the right frontal lobe. Low attenuation also includes portions of the right external capsule and right internal capsule. Overall the findings are compatible with worsening neoplastic disease. New since the prior study, there is right to left midline shift at the level of the septum pellucidum on axial image 18, estimated to measure 5-6 mm. There is no hydrocephalus or downward herniation. There is no acute extra-axial blood. The right globe prosthesis is again noted. 1. There are 2 new round and confluent areas of low attenuation in the right parietal lobe and right temporal lobe, as detailed above. Worsening confluent white matter changes are noted within portions of right cerebral hemisphere, as detailed above. Findings are compatible with worsening neoplastic disease. 2. New since the prior study is approximately 5-6 mm of right-to-left midline shift at the level of the septum pellucidum.  No hydrocephalus or downward herniation. 3. Postsurgical changes are again noted with decreasing postoperative fluid underlying the craniotomy site. 4. Neurosurgical consultation is advised. This document has been electronically signed by: Dakota Auguste M.D. on 07/27/2022 10:14 PM All CTs at this facility use dose modulation techniques and iterative reconstructions, and/or weight-based dosing when appropriate to reduce radiation to a low as reasonably achievable. XR CHEST PORTABLE    Result Date: 7/27/2022  1 view chest x-ray Comparison: CR,SR - XR CHEST PORTABLE - 07/06/2022 03:51 PM EDT Findings: No consolidation or effusion. Normal size heart. No acute fracture. 1. No acute findings. This document has been electronically signed by: Rosemary Brady MD on 07/27/2022 08:51 PM        ASSESMENT:      Principal Problem:    Breast cancer metastasized to brain, unspecified laterality ([facility] [address]  Resolved Problems:    * No resolved hospital problems. *      PLAN:  Continue dexamethasone, d/c IVF ,   Appreciate Radiation Oncology input. MRI brain ordered, can have out pt  palliative XRT. The neurosurgical service had been contacted by the ED and had recommended palliative care for the patient. Oncology consult had been placed to Dr. Valerie Wallace and indicates that he will see the patient  in the outpatient setting if she desires to pursue further treatment. Continue PT/OT and palliative care consultation     Pain management as indicated      Code status had been discussed by the ED physician with the patient while I was on the phone with him and the patient had desired no CPR, no shock no intubation in the event of a cardiopulmonary arrest.  Code status was made DNR CC - A.          DVT prophylaxis: [] Lovenox                                 [x] SCDs                                 [] SQ Heparin                                 [] Encourage ambulation, low risk for DVT, no chemical or mechanical prophylaxis necessary              [] Already on Anticoagulation                Anticipated Disposition upon discharge: [] Home                                                                         [] Home with Home Health                                                                         [] [facility]                                                                         [] [address]          Electronically signed by Jasmin Martinez MD on 7/29/2022 at 2:07 PM

## 2022-07-30 LAB
GLUCOSE BLD-MCNC: 123 MG/DL (ref 70–108)
GLUCOSE BLD-MCNC: 136 MG/DL (ref 70–108)
GLUCOSE BLD-MCNC: 157 MG/DL (ref 70–108)
GLUCOSE BLD-MCNC: 174 MG/DL (ref 70–108)

## 2022-07-30 PROCEDURE — 2140000000 HC CCU INTERMEDIATE R&B

## 2022-07-30 PROCEDURE — 2580000003 HC RX 258: Performed by: INTERNAL MEDICINE

## 2022-07-30 PROCEDURE — 82948 REAGENT STRIP/BLOOD GLUCOSE: CPT

## 2022-07-30 PROCEDURE — 6360000002 HC RX W HCPCS: Performed by: NURSE PRACTITIONER

## 2022-07-30 PROCEDURE — 6370000000 HC RX 637 (ALT 250 FOR IP): Performed by: INTERNAL MEDICINE

## 2022-07-30 RX ADMIN — ALPRAZOLAM 0.25 MG: 0.25 TABLET ORAL at 19:42

## 2022-07-30 RX ADMIN — Medication 1 TABLET: at 09:05

## 2022-07-30 RX ADMIN — SERTRALINE 50 MG: 50 TABLET, FILM COATED ORAL at 09:05

## 2022-07-30 RX ADMIN — DEXAMETHASONE SODIUM PHOSPHATE 4 MG: 4 INJECTION, SOLUTION INTRA-ARTICULAR; INTRALESIONAL; INTRAMUSCULAR; INTRAVENOUS; SOFT TISSUE at 23:52

## 2022-07-30 RX ADMIN — DEXAMETHASONE SODIUM PHOSPHATE 4 MG: 4 INJECTION, SOLUTION INTRA-ARTICULAR; INTRALESIONAL; INTRAMUSCULAR; INTRAVENOUS; SOFT TISSUE at 07:02

## 2022-07-30 RX ADMIN — SODIUM CHLORIDE, PRESERVATIVE FREE 10 ML: 5 INJECTION INTRAVENOUS at 13:54

## 2022-07-30 RX ADMIN — LEVETIRACETAM 500 MG: 500 TABLET, FILM COATED ORAL at 09:05

## 2022-07-30 RX ADMIN — SODIUM CHLORIDE, PRESERVATIVE FREE 10 ML: 5 INJECTION INTRAVENOUS at 19:42

## 2022-07-30 RX ADMIN — ATORVASTATIN CALCIUM 10 MG: 10 TABLET, FILM COATED ORAL at 19:42

## 2022-07-30 RX ADMIN — LEVETIRACETAM 500 MG: 500 TABLET, FILM COATED ORAL at 19:42

## 2022-07-30 RX ADMIN — Medication 50 MG: at 09:05

## 2022-07-30 RX ADMIN — CETIRIZINE HYDROCHLORIDE 5 MG: 10 TABLET, FILM COATED ORAL at 09:05

## 2022-07-30 RX ADMIN — Medication 1 TABLET: at 19:42

## 2022-07-30 RX ADMIN — Medication 3 MG: at 19:42

## 2022-07-30 RX ADMIN — ISOSORBIDE MONONITRATE 30 MG: 30 TABLET, EXTENDED RELEASE ORAL at 09:05

## 2022-07-30 RX ADMIN — SERTRALINE 50 MG: 50 TABLET, FILM COATED ORAL at 19:42

## 2022-07-30 RX ADMIN — LEVOTHYROXINE SODIUM 100 MCG: 0.1 TABLET ORAL at 07:02

## 2022-07-30 RX ADMIN — TRAZODONE HYDROCHLORIDE 50 MG: 50 TABLET ORAL at 19:42

## 2022-07-30 RX ADMIN — DEXAMETHASONE SODIUM PHOSPHATE 4 MG: 4 INJECTION, SOLUTION INTRA-ARTICULAR; INTRALESIONAL; INTRAMUSCULAR; INTRAVENOUS; SOFT TISSUE at 00:03

## 2022-07-30 RX ADMIN — PANTOPRAZOLE SODIUM 40 MG: 40 TABLET, DELAYED RELEASE ORAL at 07:02

## 2022-07-30 RX ADMIN — DEXAMETHASONE SODIUM PHOSPHATE 4 MG: 4 INJECTION, SOLUTION INTRA-ARTICULAR; INTRALESIONAL; INTRAMUSCULAR; INTRAVENOUS; SOFT TISSUE at 15:13

## 2022-07-30 NOTE — PROGRESS NOTES
INTERNAL MEDICINE SPECIALTIES  Progress Note For Dr Jennifer Matias       Patient:  Sergio Alvarado  YOB: 1933  Date of Service: 7/30/2022  MRN: [id number]   Acct:  [de-identified]   Primary Care Physician: Hawa Junior MD    SUBJECTIVE: has no complaints    Home Medications:   No current facility-administered medications on file prior to encounter. Current Outpatient Medications on File Prior to Encounter   Medication Sig Dispense Refill    amiodarone (CORDARONE) 200 MG tablet Take 1 tablet by mouth in the morning.  30 tablet 0    midodrine (PROAMATINE) 2.5 MG tablet Take 1 tablet by mouth 3 times daily 90 tablet 3    sertraline (ZOLOFT) 50 MG tablet take 1 tablet by mouth twice a day 180 tablet 1    Calcium Carbonate-Vitamin D (OYSTER SHELL CALCIUM/D) 500-200 MG-UNIT TABS take 1 tablet by mouth twice a day 180 tablet 3    RA BIOTIN 1000 MCG TABS Take 1,000 mcg by mouth 2 times daily 60 tablet 5    vitamin D (ERGOCALCIFEROL) 1.25 MG (10287 UT) CAPS capsule take 1 capsule by mouth every week 5 capsule 5    metoprolol tartrate (LOPRESSOR) 25 MG tablet Take 0.5 tablets by mouth 2 times daily 60 tablet 3    levothyroxine (SYNTHROID) 100 MCG tablet take 1 tablet by mouth once daily 90 tablet 0    ALPRAZolam (XANAX) 0.25 MG tablet take 1 tablet by mouth twice a day if needed for anxiety 60 tablet 2    isosorbide mononitrate (IMDUR) 30 MG extended release tablet take 1 tablet by mouth once daily 90 tablet 1    simvastatin (ZOCOR) 20 MG tablet take 1 tablet by mouth every evening 90 tablet 1    docusate (COLACE, DULCOLAX) 100 MG CAPS Take 100 mg by mouth 2 times daily 60 capsule 1    Nebulizers (COMPRESSOR/NEBULIZER) MISC 1 Device by Does not apply route 4 times daily as needed (Shortness of breath and wheezing) 1 each 3    albuterol (PROVENTIL) (2.5 MG/3ML) 0.083% nebulizer solution Take 3 mLs by nebulization every 6 hours as needed for Wheezing 120 each 3    polyethylene glycol (GLYCOLAX) 17 GM/SCOOP powder Take 17 g by mouth daily as needed (constipation) 510 g 5    pantoprazole (PROTONIX) 40 MG tablet take 1 tablet by mouth once daily 90 tablet 1    zinc sulfate (ZINCATE) 220 (50 Zn) MG capsule Take 1 capsule by mouth daily 30 capsule 0    albuterol sulfate  (90 Base) MCG/ACT inhaler Inhale 2 puffs into the lungs every 6 hours as needed for Wheezing or Shortness of Breath 18 g 3    predniSONE (DELTASONE) 1 MG tablet Take 1 tablet by mouth daily - resume after completes decadron 60 tablet 2    levETIRAcetam (KEPPRA) 500 MG tablet take 1 tablet by mouth twice a day 240 tablet 2    loratadine (CLARITIN) 10 MG tablet take 1 tablet by mouth once daily 90 tablet 1    REFRESH PLUS 0.5 % SOLN ophthalmic solution use as directed      ondansetron (ZOFRAN-ODT) 4 MG disintegrating tablet Take 4 mg by mouth every 8 hours as needed      traZODone (DESYREL) 50 MG tablet take 1 tablet by mouth at bedtime if needed for sleep (Patient not taking: No sig reported) 30 tablet 5    melatonin 3 MG TABS tablet Take 1 tablet by mouth nightly as needed (insomnia) 30 tablet 3    Nutritional Supplements (ENSURE ORIGINAL) LIQD Take 1 Can by mouth 2 times daily 60 Bottle 5    Multiple Vitamins-Minerals (CENTRUM SILVER ADULT 50+) TABS Take 1 tablet by mouth daily 90 tablet 1         Scheduled Meds:   amiodarone  200 mg Oral Daily    calcium-cholecalciferol  1 tablet Oral BID    docusate sodium  100 mg Oral BID    isosorbide mononitrate  30 mg Oral Daily    levETIRAcetam  500 mg Oral BID    levothyroxine  100 mcg Oral Daily    cetirizine  5 mg Oral Daily    metoprolol tartrate  12.5 mg Oral BID    midodrine  2.5 mg Oral TID WC    multivitamin  1 tablet Oral Daily    pantoprazole  40 mg Oral QAM AC    polyethyl glycol-propyl glycol 0.4-0.3 %  1 drop Both Eyes 4x Daily    sertraline  50 mg Oral BID    atorvastatin  10 mg Oral Nightly    [START ON 8/2/2022] vitamin D  50,000 Units Oral Weekly    zinc sulfate  50 mg Oral Daily    sodium chloride flush  5-40 mL IntraVENous 2 times per day    insulin lispro  0-4 Units SubCUTAneous TID WC    insulin lispro  0-4 Units SubCUTAneous Nightly    dexamethasone  4 mg Oral Q6H     Continuous Infusions:   sodium chloride      sodium chloride 40 mL/hr at 07/28/22 0141    dextrose       PRN Meds:albuterol, ALPRAZolam, melatonin, polyethylene glycol, traZODone, sodium chloride flush, sodium chloride, ondansetron **OR** ondansetron, acetaminophen **OR** acetaminophen, glucose, dextrose bolus **OR** dextrose bolus, glucagon (rDNA), dextrose        Allergies:  Bactrim [sulfamethoxazole-trimethoprim], Demerol, and Pcn [penicillins]    OBJECTIVE:    Vitals:   Vitals:    07/30/22 1454   BP: (!) 114/40   Pulse: 50   Resp: 18   Temp: 98.3 °F (36.8 °C)   SpO2: 95%      BMI: Body mass index is 25.96 kg/m². PHYSICAL EXAMINATION:            General appearance:  No apparent distress, appears stated age and cooperative. HEENT:  Normal cephalic, atraumatic without obvious deformity. 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 Abdomen:  abdominal scars noted, soft, non-tender, non-distended with normal bowel sounds. Musculoskeletal:  No clubbing, cyanosis  or edema bilaterally . Neurologic: Alert and oriented x3  motor strength 4/5 in left upper/ lower extremities.  5/5 RUE/RLE         Review of Labs and Diagnostic Testing:    Recent Results (from the past 24 hour(s))   POCT glucose    Collection Time: 07/29/22  8:08 PM   Result Value Ref Range    POC Glucose 114 (H) 70 - 108 mg/dl   POCT glucose    Collection Time: 07/30/22  8:01 AM   Result Value Ref Range    POC Glucose 123 (H) 70 - 108 mg/dl   POCT glucose    Collection Time: 07/30/22 11:56 AM   Result Value Ref Range    POC Glucose 136 (H) 70 - 108 mg/dl   POCT glucose    Collection Time: 07/30/22  4:20 PM   Result Value Ref Range    POC Glucose 157 (H) 70 - 108 mg/dl       Radiology:     CT Head WO Contrast    Result Date: 7/27/2022  **ADDENDUM #1 **  This report was discussed with David Manning RN on Jul 27, 2022 22:18:00 EDT. This document has been electronically signed by: Humphrey Redmond on 07/27/2022 10:19 PM ** ORIGINAL REPORT **  CT BRAIN WITHOUT CONTRAST COMPARISON: 5/23/2022. FINDINGS: Right-sided craniotomy changes are again noted, with decreased postoperative fluid underlying the craniotomy site. Round confluent areas of low attenuation are noted within the right parietal white matter and right temporal lobe which are new since the prior study. For example this measures approximately 2.8 x 2.5 cm in the right parietal lobe on axial image 21, and approximately 3.2 x 2.3 cm in the right temporal lobe on axial image 15. Additionally, there is significantly worsening confluent low attenuation involving the white matter of the right frontal lobe, right occipital lobe, right parietal lobe, and dorsal aspect of the right frontal lobe. Low attenuation also includes portions of the right external capsule and right internal capsule. Overall the findings are compatible with worsening neoplastic disease. New since the prior study, there is right to left midline shift at the level of the septum pellucidum on axial image 18, estimated to measure 5-6 mm. There is no hydrocephalus or downward herniation. There is no acute extra-axial blood. The right globe prosthesis is again noted. 1. There are 2 new round and confluent areas of low attenuation in the right parietal lobe and right temporal lobe, as detailed above. Worsening confluent white matter changes are noted within portions of right cerebral hemisphere, as detailed above. Findings are compatible with worsening neoplastic disease. 2. New since the prior study is approximately 5-6 mm of right-to-left midline shift at the level of the septum pellucidum.  No hydrocephalus or downward herniation. 3. Postsurgical changes are again noted with decreasing postoperative fluid underlying the craniotomy site. 4. Neurosurgical consultation is advised. This document has been electronically signed by: Cherelle Warren M.D. on 07/27/2022 10:14 PM All CTs at this facility use dose modulation techniques and iterative reconstructions, and/or weight-based dosing when appropriate to reduce radiation to a low as reasonably achievable. XR CHEST PORTABLE    Result Date: 7/27/2022  1 view chest x-ray Comparison: CR,SR - XR CHEST PORTABLE - 07/06/2022 03:51 PM EDT Findings: No consolidation or effusion. Normal size heart. No acute fracture. 1. No acute findings. This document has been electronically signed by: Radha Hidalgo MD on 07/27/2022 08:51 PM    MRI BRAIN :7/29/2022      1. Postoperative changes involving the right posterior temporal/parietal calvarium. 2. Large metastatic lesion in the right posterior temporal lobe patient 5.3 x 3.2 x 3.4 cm in size, significantly larger than on previous study dated third of June 2022. Extensive surrounding edema and mass effect upon the right lateral ventricle with    midline deviation towards the left side. 3. Increased signal intensity in the white matter. 4. Possible small incidental meningioma over the left parietal lobe, unchanged. No associated mass effect or edema. ASSESMENT:      Principal Problem:    Breast cancer metastasized to brain, unspecified laterality ([facility] [address]  Active Problems:    Generalized weakness  Resolved Problems:    * No resolved hospital problems. *      PLAN:  Continue dexamethasone,   Appreciate Radiation Oncology input. MRI brain , can have out pt  palliative XRT. The neurosurgical service had been contacted by the ED and had recommended palliative care for the patient.      Oncology consult had been placed to Dr. Linda Mosher and indicates that he will see the patient  in the outpatient setting if she desires to pursue

## 2022-07-30 NOTE — PLAN OF CARE
Problem: Discharge Planning  Goal: Discharge to home or other facility with appropriate resources  Outcome: Progressing  Flowsheets (Taken 7/29/2022 1945)  Discharge to home or other facility with appropriate resources:   Identify barriers to discharge with patient and caregiver   Arrange for needed discharge resources and transportation as appropriate   Identify discharge learning needs (meds, wound care, etc)     Problem: ABCDS Injury Assessment  Goal: Absence of physical injury  Outcome: Progressing     Problem: Skin/Tissue Integrity  Goal: Absence of new skin breakdown  Description: 1. Monitor for areas of redness and/or skin breakdown  2. Assess vascular access sites hourly  3. Every 4-6 hours minimum:  Change oxygen saturation probe site  4. Every 4-6 hours:  If on nasal continuous positive airway pressure, respiratory therapy assess nares and determine need for appliance change or resting period.   Outcome: Progressing

## 2022-07-30 NOTE — PLAN OF CARE
Problem: Discharge Planning  Goal: Discharge to home or other facility with appropriate resources  7/30/2022 1520 by Hanna Reilly RN  Outcome: Progressing  Flowsheets (Taken 7/30/2022 0845)  Discharge to home or other facility with appropriate resources: Identify barriers to discharge with patient and caregiver     Problem: ABCDS Injury Assessment  Goal: Absence of physical injury  7/30/2022 0457 by Robert Pires RN  Outcome: Progressing     Problem: Skin/Tissue Integrity  Goal: Absence of new skin breakdown  Description: 1. Monitor for areas of redness and/or skin breakdown  2. Assess vascular access sites hourly  3. Every 4-6 hours minimum:  Change oxygen saturation probe site  4. Every 4-6 hours:  If on nasal continuous positive airway pressure, respiratory therapy assess nares and determine need for appliance change or resting period. 7/30/2022 1520 by Hanna Reilly RN  Outcome: Progressing  Note: Pt has no new sites of skin breakdown     Problem: Safety - Adult  Goal: Free from fall injury  Outcome: Progressing  Flowsheets (Taken 7/30/2022 1520)  Free From Fall Injury:   Instruct family/caregiver on patient safety   Based on caregiver fall risk screen, instruct family/caregiver to ask for assistance with transferring infant if caregiver noted to have fall risk factors     Care plan reviewed with patient. Patient verbalizes understanding of the care plan and contributed to goal setting.

## 2022-07-30 NOTE — PROGRESS NOTES
Chart reviewed including all notes. Current documentation for plan to continue active treatment and patient considering ECF, to discuss with family. Call to brother Tye Nunez x2 to determine visit plans without answer or ability to leave message. Hospice will follow up Monday.

## 2022-07-31 LAB
CA 27-29: 9 U/ML (ref 0–38)
GLUCOSE BLD-MCNC: 123 MG/DL (ref 70–108)
GLUCOSE BLD-MCNC: 124 MG/DL (ref 70–108)
GLUCOSE BLD-MCNC: 195 MG/DL (ref 70–108)
GLUCOSE BLD-MCNC: 231 MG/DL (ref 70–108)

## 2022-07-31 PROCEDURE — 6360000002 HC RX W HCPCS: Performed by: NURSE PRACTITIONER

## 2022-07-31 PROCEDURE — 82948 REAGENT STRIP/BLOOD GLUCOSE: CPT

## 2022-07-31 PROCEDURE — 2140000000 HC CCU INTERMEDIATE R&B

## 2022-07-31 PROCEDURE — 6370000000 HC RX 637 (ALT 250 FOR IP): Performed by: INTERNAL MEDICINE

## 2022-07-31 PROCEDURE — 2580000003 HC RX 258: Performed by: INTERNAL MEDICINE

## 2022-07-31 RX ADMIN — DEXAMETHASONE SODIUM PHOSPHATE 4 MG: 4 INJECTION, SOLUTION INTRA-ARTICULAR; INTRALESIONAL; INTRAMUSCULAR; INTRAVENOUS; SOFT TISSUE at 06:02

## 2022-07-31 RX ADMIN — LEVETIRACETAM 500 MG: 500 TABLET, FILM COATED ORAL at 09:15

## 2022-07-31 RX ADMIN — SERTRALINE 50 MG: 50 TABLET, FILM COATED ORAL at 20:46

## 2022-07-31 RX ADMIN — ATORVASTATIN CALCIUM 10 MG: 10 TABLET, FILM COATED ORAL at 20:46

## 2022-07-31 RX ADMIN — Medication 1 TABLET: at 09:15

## 2022-07-31 RX ADMIN — PANTOPRAZOLE SODIUM 40 MG: 40 TABLET, DELAYED RELEASE ORAL at 06:02

## 2022-07-31 RX ADMIN — DEXAMETHASONE SODIUM PHOSPHATE 4 MG: 4 INJECTION, SOLUTION INTRA-ARTICULAR; INTRALESIONAL; INTRAMUSCULAR; INTRAVENOUS; SOFT TISSUE at 13:47

## 2022-07-31 RX ADMIN — Medication 1 TABLET: at 20:46

## 2022-07-31 RX ADMIN — LEVETIRACETAM 500 MG: 500 TABLET, FILM COATED ORAL at 20:46

## 2022-07-31 RX ADMIN — SODIUM CHLORIDE, PRESERVATIVE FREE 10 ML: 5 INJECTION INTRAVENOUS at 20:47

## 2022-07-31 RX ADMIN — LEVOTHYROXINE SODIUM 100 MCG: 0.1 TABLET ORAL at 06:02

## 2022-07-31 RX ADMIN — INSULIN LISPRO 1 UNITS: 100 INJECTION, SOLUTION INTRAVENOUS; SUBCUTANEOUS at 13:47

## 2022-07-31 RX ADMIN — CETIRIZINE HYDROCHLORIDE 5 MG: 10 TABLET, FILM COATED ORAL at 09:15

## 2022-07-31 RX ADMIN — ISOSORBIDE MONONITRATE 30 MG: 30 TABLET, EXTENDED RELEASE ORAL at 09:14

## 2022-07-31 RX ADMIN — ALPRAZOLAM 0.25 MG: 0.25 TABLET ORAL at 20:53

## 2022-07-31 RX ADMIN — SODIUM CHLORIDE, PRESERVATIVE FREE 10 ML: 5 INJECTION INTRAVENOUS at 09:16

## 2022-07-31 RX ADMIN — ALPRAZOLAM 0.25 MG: 0.25 TABLET ORAL at 09:28

## 2022-07-31 RX ADMIN — Medication 3 MG: at 20:51

## 2022-07-31 RX ADMIN — DEXAMETHASONE SODIUM PHOSPHATE 4 MG: 4 INJECTION, SOLUTION INTRA-ARTICULAR; INTRALESIONAL; INTRAMUSCULAR; INTRAVENOUS; SOFT TISSUE at 18:52

## 2022-07-31 RX ADMIN — SERTRALINE 50 MG: 50 TABLET, FILM COATED ORAL at 09:15

## 2022-07-31 RX ADMIN — Medication 50 MG: at 09:15

## 2022-07-31 ASSESSMENT — PAIN SCALES - WONG BAKER
WONGBAKER_NUMERICALRESPONSE: 0

## 2022-07-31 ASSESSMENT — PAIN SCALES - GENERAL: PAINLEVEL_OUTOF10: 0

## 2022-07-31 NOTE — PLAN OF CARE
Problem: Discharge Planning  Goal: Discharge to home or other facility with appropriate resources  Outcome: Progressing     Problem: ABCDS Injury Assessment  Goal: Absence of physical injury  Outcome: Progressing  Flowsheets (Taken 7/30/2022 2140)  Absence of Physical Injury: Implement safety measures based on patient assessment     Problem: Skin/Tissue Integrity  Goal: Absence of new skin breakdown  Description: 1. Monitor for areas of redness and/or skin breakdown  2. Assess vascular access sites hourly  3. Every 4-6 hours minimum:  Change oxygen saturation probe site  4. Every 4-6 hours:  If on nasal continuous positive airway pressure, respiratory therapy assess nares and determine need for appliance change or resting period.   Outcome: Progressing     Problem: Safety - Adult  Goal: Free from fall injury  Outcome: Progressing  Flowsheets (Taken 7/30/2022 2140)  Free From Fall Injury: Instruct family/caregiver on patient safety

## 2022-07-31 NOTE — PLAN OF CARE
Problem: Discharge Planning  Goal: Discharge to home or other facility with appropriate resources  7/31/2022 1651 by Jon Hummel RN  Outcome: Progressing  Flowsheets  Taken 7/31/2022 1651  Discharge to home or other facility with appropriate resources:   Identify barriers to discharge with patient and caregiver   Arrange for needed discharge resources and transportation as appropriate  Taken 7/31/2022 0900  Discharge to home or other facility with appropriate resources: Identify barriers to discharge with patient and caregiver     Problem: ABCDS Injury Assessment  Goal: Absence of physical injury  7/31/2022 1651 by Jon Hummel RN  Note: Patient educated on the importance of using her call light and using a walker when she is up and moving. Problem: Skin/Tissue Integrity  Goal: Absence of new skin breakdown  Description: 1. Monitor for areas of redness and/or skin breakdown  2. Assess vascular access sites hourly  3. Every 4-6 hours minimum:  Change oxygen saturation probe site  4. Every 4-6 hours:  If on nasal continuous positive airway pressure, respiratory therapy assess nares and determine need for appliance change or resting period. 7/31/2022 1651 by Jon Hummel RN  Outcome: Progressing  Note: Patient has been up and moving.       Problem: Pain  Goal: Verbalizes/displays adequate comfort level or baseline comfort level  Flowsheets (Taken 7/31/2022 1651)  Verbalizes/displays adequate comfort level or baseline comfort level:   Encourage patient to monitor pain and request assistance   Assess pain using appropriate pain scale

## 2022-07-31 NOTE — PROGRESS NOTES
INTERNAL MEDICINE SPECIALTIES  Progress Note For Dr Farida Morrissey       Patient:  Toni Wiggins  YOB: 1933  Date of Service: 7/31/2022  MRN: [id number]   Acct:  [de-identified]   Primary Care Physician: Valeri Mac MD    SUBJECTIVE: has no complaints    Home Medications:   No current facility-administered medications on file prior to encounter. Current Outpatient Medications on File Prior to Encounter   Medication Sig Dispense Refill    amiodarone (CORDARONE) 200 MG tablet Take 1 tablet by mouth in the morning.  30 tablet 0    midodrine (PROAMATINE) 2.5 MG tablet Take 1 tablet by mouth 3 times daily 90 tablet 3    sertraline (ZOLOFT) 50 MG tablet take 1 tablet by mouth twice a day 180 tablet 1    Calcium Carbonate-Vitamin D (OYSTER SHELL CALCIUM/D) 500-200 MG-UNIT TABS take 1 tablet by mouth twice a day 180 tablet 3    RA BIOTIN 1000 MCG TABS Take 1,000 mcg by mouth 2 times daily 60 tablet 5    vitamin D (ERGOCALCIFEROL) 1.25 MG (35918 UT) CAPS capsule take 1 capsule by mouth every week 5 capsule 5    metoprolol tartrate (LOPRESSOR) 25 MG tablet Take 0.5 tablets by mouth 2 times daily 60 tablet 3    levothyroxine (SYNTHROID) 100 MCG tablet take 1 tablet by mouth once daily 90 tablet 0    ALPRAZolam (XANAX) 0.25 MG tablet take 1 tablet by mouth twice a day if needed for anxiety 60 tablet 2    isosorbide mononitrate (IMDUR) 30 MG extended release tablet take 1 tablet by mouth once daily 90 tablet 1    simvastatin (ZOCOR) 20 MG tablet take 1 tablet by mouth every evening 90 tablet 1    docusate (COLACE, DULCOLAX) 100 MG CAPS Take 100 mg by mouth 2 times daily 60 capsule 1    Nebulizers (COMPRESSOR/NEBULIZER) MISC 1 Device by Does not apply route 4 times daily as needed (Shortness of breath and wheezing) 1 each 3    albuterol (PROVENTIL) (2.5 MG/3ML) 0.083% nebulizer solution Take 3 mLs by nebulization every 6 hours as needed for Wheezing 120 each 3    polyethylene glycol (GLYCOLAX) 17 GM/SCOOP powder Take 17 g by mouth daily as needed (constipation) 510 g 5    pantoprazole (PROTONIX) 40 MG tablet take 1 tablet by mouth once daily 90 tablet 1    zinc sulfate (ZINCATE) 220 (50 Zn) MG capsule Take 1 capsule by mouth daily 30 capsule 0    albuterol sulfate  (90 Base) MCG/ACT inhaler Inhale 2 puffs into the lungs every 6 hours as needed for Wheezing or Shortness of Breath 18 g 3    predniSONE (DELTASONE) 1 MG tablet Take 1 tablet by mouth daily - resume after completes decadron 60 tablet 2    levETIRAcetam (KEPPRA) 500 MG tablet take 1 tablet by mouth twice a day 240 tablet 2    loratadine (CLARITIN) 10 MG tablet take 1 tablet by mouth once daily 90 tablet 1    REFRESH PLUS 0.5 % SOLN ophthalmic solution use as directed      ondansetron (ZOFRAN-ODT) 4 MG disintegrating tablet Take 4 mg by mouth every 8 hours as needed      traZODone (DESYREL) 50 MG tablet take 1 tablet by mouth at bedtime if needed for sleep (Patient not taking: No sig reported) 30 tablet 5    melatonin 3 MG TABS tablet Take 1 tablet by mouth nightly as needed (insomnia) 30 tablet 3    Nutritional Supplements (ENSURE ORIGINAL) LIQD Take 1 Can by mouth 2 times daily 60 Bottle 5    Multiple Vitamins-Minerals (CENTRUM SILVER ADULT 50+) TABS Take 1 tablet by mouth daily 90 tablet 1         Scheduled Meds:   amiodarone  200 mg Oral Daily    calcium-cholecalciferol  1 tablet Oral BID    docusate sodium  100 mg Oral BID    isosorbide mononitrate  30 mg Oral Daily    levETIRAcetam  500 mg Oral BID    levothyroxine  100 mcg Oral Daily    cetirizine  5 mg Oral Daily    metoprolol tartrate  12.5 mg Oral BID    midodrine  2.5 mg Oral TID WC    multivitamin  1 tablet Oral Daily    pantoprazole  40 mg Oral QAM AC    polyethyl glycol-propyl glycol 0.4-0.3 %  1 drop Both Eyes 4x Daily    sertraline  50 mg Oral BID    atorvastatin  10 mg Oral Nightly    [START ON 8/2/2022] vitamin D  50,000 Units Oral Weekly    zinc sulfate  50 mg Oral Daily    sodium chloride flush  5-40 mL IntraVENous 2 times per day    insulin lispro  0-4 Units SubCUTAneous TID WC    insulin lispro  0-4 Units SubCUTAneous Nightly    dexamethasone  4 mg Oral Q6H     Continuous Infusions:   sodium chloride      sodium chloride 40 mL/hr at 07/28/22 0141    dextrose       PRN Meds:albuterol, ALPRAZolam, melatonin, polyethylene glycol, traZODone, sodium chloride flush, sodium chloride, ondansetron **OR** ondansetron, acetaminophen **OR** acetaminophen, glucose, dextrose bolus **OR** dextrose bolus, glucagon (rDNA), dextrose        Allergies:  Bactrim [sulfamethoxazole-trimethoprim], Demerol, and Pcn [penicillins]    OBJECTIVE:    Vitals:   Vitals:    07/31/22 0348   BP: (!) 156/63   Pulse: (!) 49   Resp: 15   Temp: 97.9 °F (36.6 °C)   SpO2: 94%      BMI: Body mass index is 26.01 kg/m². PHYSICAL EXAMINATION:            General appearance:  No apparent distress, appears stated age and cooperative. HEENT:  Normal cephalic, atraumatic without obvious deformity. 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 Abdomen:  abdominal scars noted, soft, non-tender, non-distended with normal bowel sounds. Musculoskeletal:  No clubbing, cyanosis  or edema bilaterally . Neurologic: Alert and oriented x3  motor strength 4/5 in left upper/ lower extremities.  5/5 RUE/RLE         Review of Labs and Diagnostic Testing:    Recent Results (from the past 24 hour(s))   POCT glucose    Collection Time: 07/30/22  8:01 AM   Result Value Ref Range    POC Glucose 123 (H) 70 - 108 mg/dl   POCT glucose    Collection Time: 07/30/22 11:56 AM   Result Value Ref Range    POC Glucose 136 (H) 70 - 108 mg/dl   POCT glucose    Collection Time: 07/30/22  4:20 PM   Result Value Ref Range    POC Glucose 157 (H) 70 - 108 mg/dl   POCT glucose    Collection Time: 07/30/22  7:32 PM Result Value Ref Range    POC Glucose 174 (H) 70 - 108 mg/dl       Radiology:     CT Head WO Contrast    Result Date: 7/27/2022  **ADDENDUM #1 **  This report was discussed with Makenna Mcdonnell RN on Jul 27, 2022 22:18:00 EDT. This document has been electronically signed by: Neeraj Lagunas on 07/27/2022 10:19 PM ** ORIGINAL REPORT **  CT BRAIN WITHOUT CONTRAST COMPARISON: 5/23/2022. FINDINGS: Right-sided craniotomy changes are again noted, with decreased postoperative fluid underlying the craniotomy site. Round confluent areas of low attenuation are noted within the right parietal white matter and right temporal lobe which are new since the prior study. For example this measures approximately 2.8 x 2.5 cm in the right parietal lobe on axial image 21, and approximately 3.2 x 2.3 cm in the right temporal lobe on axial image 15. Additionally, there is significantly worsening confluent low attenuation involving the white matter of the right frontal lobe, right occipital lobe, right parietal lobe, and dorsal aspect of the right frontal lobe. Low attenuation also includes portions of the right external capsule and right internal capsule. Overall the findings are compatible with worsening neoplastic disease. New since the prior study, there is right to left midline shift at the level of the septum pellucidum on axial image 18, estimated to measure 5-6 mm. There is no hydrocephalus or downward herniation. There is no acute extra-axial blood. The right globe prosthesis is again noted. 1. There are 2 new round and confluent areas of low attenuation in the right parietal lobe and right temporal lobe, as detailed above. Worsening confluent white matter changes are noted within portions of right cerebral hemisphere, as detailed above. Findings are compatible with worsening neoplastic disease. 2. New since the prior study is approximately 5-6 mm of right-to-left midline shift at the level of the septum pellucidum.  No hydrocephalus or downward herniation. 3. Postsurgical changes are again noted with decreasing postoperative fluid underlying the craniotomy site. 4. Neurosurgical consultation is advised. This document has been electronically signed by: Kyle Heath M.D. on 07/27/2022 10:14 PM All CTs at this facility use dose modulation techniques and iterative reconstructions, and/or weight-based dosing when appropriate to reduce radiation to a low as reasonably achievable. XR CHEST PORTABLE    Result Date: 7/27/2022  1 view chest x-ray Comparison: CR,SR - XR CHEST PORTABLE - 07/06/2022 03:51 PM EDT Findings: No consolidation or effusion. Normal size heart. No acute fracture. 1. No acute findings. This document has been electronically signed by: Barrett Nam MD on 07/27/2022 08:51 PM    MRI BRAIN :7/29/2022      1. Postoperative changes involving the right posterior temporal/parietal calvarium. 2. Large metastatic lesion in the right posterior temporal lobe patient 5.3 x 3.2 x 3.4 cm in size, significantly larger than on previous study dated third of June 2022. Extensive surrounding edema and mass effect upon the right lateral ventricle with    midline deviation towards the left side. 3. Increased signal intensity in the white matter. 4. Possible small incidental meningioma over the left parietal lobe, unchanged. No associated mass effect or edema. ASSESMENT:      Principal Problem:    Breast cancer metastasized to brain, unspecified laterality ([address]  Active Problems:    Generalized weakness  Resolved Problems:    * No resolved hospital problems. *      PLAN:  Continue dexamethasone,   Appreciate Radiation Oncology input. MRI brain  as noted above,hopefully start  palliative XRT tomorrow,  can also have out pt  palliative XRT. The neurosurgical service had been contacted by the ED and had recommended palliative care for the patient.      Oncology consult had been placed to Dr. Sarath Murphy and indicates that he will see the patient  in the outpatient setting if she desires to pursue further treatment. Continue PT/OT and palliative care consultation     Pain management as indicated     Code status had been discussed by the ED physician with the patient while I was on the phone with him and the patient had desired no CPR, no shock no intubation in the event of a cardiopulmonary arrest.  Code status was made DNR CC - A. Pt has poor vision ,requires 2 stand by assist to ambulate, family working on providing further care for the home,  to assist. Also consider physiatry consult for IPR.           DVT prophylaxis: [] Lovenox                                 [x] SCDs                                 [] SQ Heparin                                 [] Encourage ambulation, low risk for DVT, no chemical or mechanical prophylaxis necessary              [] Already on Anticoagulation                Anticipated Disposition upon discharge: [] Home                                                                         [] Home with Home Health                                                                         [] [facility]                                                                         [] [address] [place] [address],[address]          Electronically signed by Jorgito De Anda MD on 7/31/2022 at 7:25 AM

## 2022-08-01 ENCOUNTER — CARE COORDINATION (OUTPATIENT)
Dept: CARE COORDINATION | Age: 87
End: 2022-08-01

## 2022-08-01 VITALS
TEMPERATURE: 98 F | HEART RATE: 54 BPM | SYSTOLIC BLOOD PRESSURE: 160 MMHG | DIASTOLIC BLOOD PRESSURE: 69 MMHG | BODY MASS INDEX: 26.41 KG/M2 | HEIGHT: 60 IN | OXYGEN SATURATION: 93 % | WEIGHT: 134.5 LBS | RESPIRATION RATE: 16 BRPM

## 2022-08-01 LAB
GLUCOSE BLD-MCNC: 134 MG/DL (ref 70–108)
GLUCOSE BLD-MCNC: 137 MG/DL (ref 70–108)

## 2022-08-01 PROCEDURE — 6360000002 HC RX W HCPCS: Performed by: NURSE PRACTITIONER

## 2022-08-01 PROCEDURE — 97162 PT EVAL MOD COMPLEX 30 MIN: CPT

## 2022-08-01 PROCEDURE — 82948 REAGENT STRIP/BLOOD GLUCOSE: CPT

## 2022-08-01 PROCEDURE — 6370000000 HC RX 637 (ALT 250 FOR IP): Performed by: INTERNAL MEDICINE

## 2022-08-01 PROCEDURE — 99221 1ST HOSP IP/OBS SF/LOW 40: CPT | Performed by: NURSE PRACTITIONER

## 2022-08-01 PROCEDURE — 97116 GAIT TRAINING THERAPY: CPT

## 2022-08-01 PROCEDURE — 2580000003 HC RX 258: Performed by: INTERNAL MEDICINE

## 2022-08-01 RX ORDER — DEXAMETHASONE 4 MG/1
4 TABLET ORAL EVERY 6 HOURS
Qty: 80 TABLET | Refills: 1 | Status: SHIPPED | OUTPATIENT
Start: 2022-08-01 | End: 2022-08-24

## 2022-08-01 RX ORDER — HYDROCODONE BITARTRATE AND ACETAMINOPHEN 5; 325 MG/1; MG/1
1 TABLET ORAL EVERY 6 HOURS PRN
Qty: 28 TABLET | Refills: 0 | Status: SHIPPED | OUTPATIENT
Start: 2022-08-01 | End: 2022-08-12 | Stop reason: SDUPTHER

## 2022-08-01 RX ADMIN — METOPROLOL TARTRATE 12.5 MG: 25 TABLET, FILM COATED ORAL at 09:18

## 2022-08-01 RX ADMIN — DEXAMETHASONE SODIUM PHOSPHATE 4 MG: 4 INJECTION, SOLUTION INTRA-ARTICULAR; INTRALESIONAL; INTRAMUSCULAR; INTRAVENOUS; SOFT TISSUE at 12:16

## 2022-08-01 RX ADMIN — AMIODARONE HYDROCHLORIDE 200 MG: 200 TABLET ORAL at 09:18

## 2022-08-01 RX ADMIN — Medication 1 TABLET: at 09:16

## 2022-08-01 RX ADMIN — Medication 1 TABLET: at 09:17

## 2022-08-01 RX ADMIN — Medication 50 MG: at 09:17

## 2022-08-01 RX ADMIN — DEXAMETHASONE SODIUM PHOSPHATE 4 MG: 4 INJECTION, SOLUTION INTRA-ARTICULAR; INTRALESIONAL; INTRAMUSCULAR; INTRAVENOUS; SOFT TISSUE at 06:18

## 2022-08-01 RX ADMIN — SERTRALINE 50 MG: 50 TABLET, FILM COATED ORAL at 09:17

## 2022-08-01 RX ADMIN — LEVETIRACETAM 500 MG: 500 TABLET, FILM COATED ORAL at 09:18

## 2022-08-01 RX ADMIN — PANTOPRAZOLE SODIUM 40 MG: 40 TABLET, DELAYED RELEASE ORAL at 06:18

## 2022-08-01 RX ADMIN — MIDODRINE HYDROCHLORIDE 2.5 MG: 2.5 TABLET ORAL at 12:16

## 2022-08-01 RX ADMIN — ISOSORBIDE MONONITRATE 30 MG: 30 TABLET, EXTENDED RELEASE ORAL at 09:18

## 2022-08-01 RX ADMIN — SODIUM CHLORIDE, PRESERVATIVE FREE 10 ML: 5 INJECTION INTRAVENOUS at 09:16

## 2022-08-01 RX ADMIN — DEXAMETHASONE SODIUM PHOSPHATE 4 MG: 4 INJECTION, SOLUTION INTRA-ARTICULAR; INTRALESIONAL; INTRAMUSCULAR; INTRAVENOUS; SOFT TISSUE at 01:03

## 2022-08-01 RX ADMIN — DOCUSATE SODIUM 100 MG: 100 CAPSULE, LIQUID FILLED ORAL at 09:20

## 2022-08-01 RX ADMIN — CETIRIZINE HYDROCHLORIDE 5 MG: 10 TABLET, FILM COATED ORAL at 09:20

## 2022-08-01 RX ADMIN — MIDODRINE HYDROCHLORIDE 2.5 MG: 2.5 TABLET ORAL at 09:16

## 2022-08-01 RX ADMIN — LEVOTHYROXINE SODIUM 100 MCG: 0.1 TABLET ORAL at 06:18

## 2022-08-01 ASSESSMENT — PAIN SCALES - WONG BAKER

## 2022-08-01 NOTE — PROGRESS NOTES
Take 17 g by mouth daily as needed (constipation) 510 g 5    pantoprazole (PROTONIX) 40 MG tablet take 1 tablet by mouth once daily 90 tablet 1    zinc sulfate (ZINCATE) 220 (50 Zn) MG capsule Take 1 capsule by mouth daily 30 capsule 0    albuterol sulfate  (90 Base) MCG/ACT inhaler Inhale 2 puffs into the lungs every 6 hours as needed for Wheezing or Shortness of Breath 18 g 3    predniSONE (DELTASONE) 1 MG tablet Take 1 tablet by mouth daily - resume after completes decadron 60 tablet 2    levETIRAcetam (KEPPRA) 500 MG tablet take 1 tablet by mouth twice a day 240 tablet 2    loratadine (CLARITIN) 10 MG tablet take 1 tablet by mouth once daily 90 tablet 1    REFRESH PLUS 0.5 % SOLN ophthalmic solution use as directed      ondansetron (ZOFRAN-ODT) 4 MG disintegrating tablet Take 4 mg by mouth every 8 hours as needed      traZODone (DESYREL) 50 MG tablet take 1 tablet by mouth at bedtime if needed for sleep (Patient not taking: No sig reported) 30 tablet 5    melatonin 3 MG TABS tablet Take 1 tablet by mouth nightly as needed (insomnia) 30 tablet 3    Nutritional Supplements (ENSURE ORIGINAL) LIQD Take 1 Can by mouth 2 times daily 60 Bottle 5    Multiple Vitamins-Minerals (CENTRUM SILVER ADULT 50+) TABS Take 1 tablet by mouth daily 90 tablet 1         Scheduled Meds:   amiodarone  200 mg Oral Daily    calcium-cholecalciferol  1 tablet Oral BID    docusate sodium  100 mg Oral BID    isosorbide mononitrate  30 mg Oral Daily    levETIRAcetam  500 mg Oral BID    levothyroxine  100 mcg Oral Daily    cetirizine  5 mg Oral Daily    metoprolol tartrate  12.5 mg Oral BID    midodrine  2.5 mg Oral TID WC    multivitamin  1 tablet Oral Daily    pantoprazole  40 mg Oral QAM AC    polyethyl glycol-propyl glycol 0.4-0.3 %  1 drop Both Eyes 4x Daily    sertraline  50 mg Oral BID    atorvastatin  10 mg Oral Nightly    [START ON 8/2/2022] vitamin D  50,000 Units Oral Weekly    zinc sulfate  50 mg Oral Daily    sodium chloride flush  5-40 mL IntraVENous 2 times per day    insulin lispro  0-4 Units SubCUTAneous TID WC    insulin lispro  0-4 Units SubCUTAneous Nightly    dexamethasone  4 mg Oral Q6H     Continuous Infusions:   sodium chloride      sodium chloride 40 mL/hr at 07/28/22 0141    dextrose       PRN Meds:albuterol, ALPRAZolam, melatonin, polyethylene glycol, traZODone, sodium chloride flush, sodium chloride, ondansetron **OR** ondansetron, acetaminophen **OR** acetaminophen, glucose, dextrose bolus **OR** dextrose bolus, glucagon (rDNA), dextrose        Allergies:  Bactrim [sulfamethoxazole-trimethoprim], Demerol, and Pcn [penicillins]    OBJECTIVE:    Vitals:   Vitals:    08/01/22 0815   BP: (!) 158/87   Pulse: 56   Resp: 18   Temp:    SpO2:       BMI: Body mass index is 26.27 kg/m². PHYSICAL EXAMINATION:            General appearance:  No apparent distress, appears stated age and cooperative. HEENT:  Normal cephalic, atraumatic without obvious deformity. 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 Abdomen:  abdominal scars noted, soft, non-tender, non-distended with normal bowel sounds. Musculoskeletal:  No clubbing, cyanosis  or edema bilaterally . Neurologic: Alert and oriented x3  motor strength 4/5 in left upper/ lower extremities.  5/5 RUE/RLE         Review of Labs and Diagnostic Testing:    Recent Results (from the past 24 hour(s))   POCT glucose    Collection Time: 07/31/22 12:11 PM   Result Value Ref Range    POC Glucose 231 (H) 70 - 108 mg/dl   POCT glucose    Collection Time: 07/31/22  4:24 PM   Result Value Ref Range    POC Glucose 123 (H) 70 - 108 mg/dl   POCT glucose    Collection Time: 07/31/22  7:56 PM   Result Value Ref Range    POC Glucose 195 (H) 70 - 108 mg/dl   POCT glucose    Collection Time: 08/01/22  7:47 AM   Result Value Ref Range    POC Glucose 137 (H) 70 - 108 mg/dl       Radiology:     CT Head WO Contrast    Result Date: 7/27/2022  **ADDENDUM #1 **  This report was discussed with Kris Jarquin RN on Jul 27, 2022 22:18:00 EDT. This document has been electronically signed by: Zion Zelaya on 07/27/2022 10:19 PM ** ORIGINAL REPORT **  CT BRAIN WITHOUT CONTRAST COMPARISON: 5/23/2022. FINDINGS: Right-sided craniotomy changes are again noted, with decreased postoperative fluid underlying the craniotomy site. Round confluent areas of low attenuation are noted within the right parietal white matter and right temporal lobe which are new since the prior study. For example this measures approximately 2.8 x 2.5 cm in the right parietal lobe on axial image 21, and approximately 3.2 x 2.3 cm in the right temporal lobe on axial image 15. Additionally, there is significantly worsening confluent low attenuation involving the white matter of the right frontal lobe, right occipital lobe, right parietal lobe, and dorsal aspect of the right frontal lobe. Low attenuation also includes portions of the right external capsule and right internal capsule. Overall the findings are compatible with worsening neoplastic disease. New since the prior study, there is right to left midline shift at the level of the septum pellucidum on axial image 18, estimated to measure 5-6 mm. There is no hydrocephalus or downward herniation. There is no acute extra-axial blood. The right globe prosthesis is again noted. 1. There are 2 new round and confluent areas of low attenuation in the right parietal lobe and right temporal lobe, as detailed above. Worsening confluent white matter changes are noted within portions of right cerebral hemisphere, as detailed above. Findings are compatible with worsening neoplastic disease. 2. New since the prior study is approximately 5-6 mm of right-to-left midline shift at the level of the septum pellucidum. No hydrocephalus or downward herniation.  3. Postsurgical changes are again noted with decreasing postoperative fluid underlying the craniotomy site. 4. Neurosurgical consultation is advised. This document has been electronically signed by: Ramírez Fraire M.D. on 07/27/2022 10:14 PM All CTs at this facility use dose modulation techniques and iterative reconstructions, and/or weight-based dosing when appropriate to reduce radiation to a low as reasonably achievable. XR CHEST PORTABLE    Result Date: 7/27/2022  1 view chest x-ray Comparison: CR,SR - XR CHEST PORTABLE - 07/06/2022 03:51 PM EDT Findings: No consolidation or effusion. Normal size heart. No acute fracture. 1. No acute findings. This document has been electronically signed by: Rosalie Candelario MD on 07/27/2022 08:51 PM    MRI BRAIN :7/29/2022      1. Postoperative changes involving the right posterior temporal/parietal calvarium. 2. Large metastatic lesion in the right posterior temporal lobe patient 5.3 x 3.2 x 3.4 cm in size, significantly larger than on previous study dated third of June 2022. Extensive surrounding edema and mass effect upon the right lateral ventricle with    midline deviation towards the left side. 3. Increased signal intensity in the white matter. 4. Possible small incidental meningioma over the left parietal lobe, unchanged. No associated mass effect or edema. ASSESMENT:      Principal Problem:    Breast cancer metastasized to brain, unspecified laterality ([address]  Active Problems:    Generalized weakness  Resolved Problems:    * No resolved hospital problems. *      PLAN:  Continue dexamethasone,   Appreciate Radiation Oncology input. MRI brain  as noted above, Radiation oncologist to set time for XRT of the brain. The neurosurgical service had been contacted by the ED and had recommended palliative care for the patient.     Patient's niece will be living with the patient so the plan is to discharge her today     Oncology consult had been placed to  Leon Workman and indicates that he will see the patient  in the outpatient setting if she desires to pursue further treatment. Continue PT/OT and palliative care consultation     Pain management as indicated     Code status Limited code x4,  DNR CC - A.      Home health evaluation      DVT prophylaxis: [] Lovenox                                 [x] SCDs                                 [] SQ Heparin                                 [] Encourage ambulation, low risk for DVT, no chemical or mechanical prophylaxis necessary              [] Already on Anticoagulation                Anticipated Disposition upon discharge: [] Home                                                                         [] Home with Home Health                                                                         [] [facility]                                                                         [] [address]          Electronically signed by Malathi Wilks MD on 8/1/2022 at 10:33 AM

## 2022-08-01 NOTE — PROGRESS NOTES
[address] [place] [address]  INPATIENT PHYSICAL THERAPY  EVALUATION  [facility] - 3B-32/032-A    Time In: 2653  Time Out: 2822  Timed Code Treatment Minutes: 15 Minutes  Minutes: 21          Date: 2022  Patient Name: Zaid Kumar,  Gender:  female        MRN: [id number]  : 1933  (80 y.o.)      Referring Practitioner: Dannie Zepeda MD  Diagnosis: Generalized weakness  Additional Pertinent Hx: The patient is a 80 y.o. 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 She had been treated with chemotherapy but no surgery for the left breast CA, she however did undergo craniotomy for resection / debulking of the right-sided intracranial metastatic tumor on . She has a history of GERD, DJD, HLD, hypothyroidism, MDRO resistance. She had presented to the emergency room with generalized weakness over a period of a week, she  has had some mild right-sided headaches and complained of feeling lightheaded, she at times feels like she may pass out. She complained of generalized fatigue no rhinorrhea nasal congestion or sore throat and has had some nausea without vomiting. There has been no fever or chills. There has been no dysuria material flank pains or urgency. Head CT scan obtained in the ED had shown 2 new round and confluent areas of low attenuation in the right parietal lobe and right temporal lobe,  worsening confluent white matter changes are noted within portions of right cerebral hemisphere, findings are compatible with worsening neoplastic disease. Also new since the prior study is a 5-6 mm right to left midline shift in the level of the septum pellucidum. \"     Restrictions/Precautions:  Restrictions/Precautions: Fall Risk, General Precautions  Position Activity Restriction  Other position/activity restrictions: artificial R eye, poor vision in L; legally blind    Subjective:  Chart Reviewed: Yes  Patient assessed sequencing. Min A required for RW navigation d/t poor vision       Functional Outcome Measures: Completed  AM-PAC Inpatient Mobility Raw Score : 17  AM-PAC Inpatient T-Scale Score : 42.13    ASSESSMENT:  Activity Tolerance:  Patient tolerance of  treatment: good. Treatment Initiated: Treatment and education initiated within context of evaluation. Evaluation time included review of current medical information, gathering information related to past medical, social and functional history, completion of standardized testing, formal and informal observation of tasks, assessment of data and development of plan of care and goals. Treatment time included skilled education and facilitation of tasks to increase safety and independence with functional mobility for improved independence and quality of life. Assessment: Body Structures, Functions, Activity Limitations Requiring Skilled Therapeutic Intervention: Decreased functional mobility , Decreased strength, Decreased endurance, Decreased balance, Decreased posture  Assessment: Lamin Alatorre is a 80 y.o. female that presents with generalized weakness. she is ind prior to admission now requiring assist for basic mobility. Pt demonstrates a decrease in baseline by way of bed mobility, transfers and ambulation secondary to decreased activity tolerance, strength, fatigue, and balance deficits. Pt will benefit from skilled PT services throughout admission and beyond hospital discharge for improvements in functional mobility and in order to decrease fall risk and return pt to OF. Therapy Prognosis: Good    Requires PT Follow-Up: Yes    Discharge Recommendations:  Discharge Recommendations: Home with Home health PT, 24 hour supervision or assist    Patient Education:      .     Patient Education  Education Given To: Patient  Education Provided: Role of Therapy, Plan of Care  Education Method: Verbal  Barriers to Learning: None  Education Outcome: Verbalized understanding       Equipment Recommendations:  Equipment Needed: No    Plan:  Current Treatment Recommendations: Strengthening, Endurance training, Functional mobility training, Gait training  Plan:  (5x GM)    Goals:  Patient goals : get better and go home  Short Term Goals  Time Frame for Short term goals: by discharge  Short term goal 1: bed mobility with HOB flat, no rails, mod I for increased functional ind  Short term goal 2: sit<>stand from various surfaces with LRD mod I for safe transfers  Short term goal 3: ambulate Jose Eduardo  1560' with LRD mod I for safe household distances  Short term goal 4: navigate 2 steps with LRD mod I for safe enter/exit of home  Long Term Goals  Time Frame for Long term goals : NA d/t short ELOS    Following session, patient left in safe position with all fall risk precautions in place.     Shanti Lee PT, DPT

## 2022-08-01 NOTE — CARE COORDINATION
8/1/22, 12:00 PM EDT    Patient goals/plan/ treatment preferences discussed by  and . Patient goals/plan/ treatment preferences reviewed with patient/ family. Patient/ family verbalize understanding of discharge plan and are in agreement with goal/plan/treatment preferences. Understanding was demonstrated using the teach back method. AVS provided by RN at time of discharge, which includes all necessary medical information pertaining to the patients current course of illness, treatment, post-discharge goals of care, and treatment preferences. Services At/After Discharge: Home Health, Aide services, Nursing service, OT, and PT       IMM Letter  IMM Letter given to Patient/Family/Significant other/Guardian/POA/by[de-identified] Isela Gallegos RN, CM  IMM Letter date given[de-identified] 08/01/22  IMM Letter time given[de-identified] 0     Latanya will be discharged to home today. Her niece Carol Quevedo and Krista's spouse were in the room. Milo German reports that she will provide [place] 24/7 care. When she needs to return back home she will find someone else to stay. Milo German does not drive. [facility] will resume aid services. Added RN, PT and OT orders. Spoke with Gricelda Virk at the agency to make them aware. Called Merly Stock CM and made her aware of discharge.

## 2022-08-01 NOTE — PROGRESS NOTES
Noted hospice referral order. Co-worker had attempted to speak with patient's brother over weekend about consult with no answer. Noted SW not that patient being discharged with [facility] service with RN, PT, OT, and aide service with stanley Keyes and her  to assist with care. Visit to room, patient sitting up in chair. Introduced self. Patient and family voiced that they were unaware of hospice consult and do not want service. Latanya said \"I don't want no hospice\". Folder given and encouraged to call if would want service in future.

## 2022-08-01 NOTE — CONSULTS
Physical Medicine & Rehabilitation   Consult Note      Admitting Physician: Dami Powers MD    Primary Care Provider: Hawa Junior MD     Reason for Consult:  metastatic breast cancer to the brain. Rehab needs    History of Present Illness:  Sergio Alvarado is a 80 y.o. female admitted to [facility] on 7/27/2022. Patient  has a past medical history of Anxiety, Blind right eye, Breast cancer ([address] Breast cancer metastasized to liver (5/16) and brain (7/21) ([address] Carotid stenosis, Colostomy in place [facility]), Degenerative arthritis of cervical spine, GERD (gastroesophageal reflux disease), History of craniotomy, Hx antineoplastic chemo, Hypercholesteremia, Hypoestrogenism, Hypothyroidism, Lumbago, Lumbosacral spinal stenosis, MDRO (multiple drug resistant organisms) resistance, Metastasis ([address] Personal history of cardiac dysrhythmia, Respiratory tract infection due to COVID-19 virus, Sigmoid diverticulosis, Spondylolisthesis of lumbosacral region, Steroid-induced hyperglycemia, Subclavian artery stenosis ([address] Superior and Inferior Pubic ramus fracture, right, closed, initial encounter ([address] SVT (supraventricular tachycardia) ([address] Temporal arteritis ([address] Vertebral artery occlusion, and Vitamin D deficiency. Patient presented to [facility] ER with fatigue and headache. Patient with known metastatic disease and was following with radiation oncology and neurosurgery. Patient chart reviewed and was to follow up with Dr Dorothy Yousif, but it doesn't appear that she did. CT Head on 7/27/22 showed 2 new round and confluent areas of low attenuation in the right parietal lobe and right temporal lobe. Worsening confluent white matter changes are noted within portions of right cerebral hemisphere, as detailed above. Findings are compatible with worsening neoplastic disease. New since the prior study is approximately 5-6 mm of right-to-left midline shift at the level of the septum pellucidum.  No hydrocephalus or downward herniation. Postsurgical changes are again noted with decreasing postoperative fluid underlying the craniotomy site. Radiation oncology has been consulted, they are planning palliative radiation treatment. Neurosurgery consulted, no further debulking recommended. Oncology with recommendations for decadron and possible chemo depending on further testing. Patient seen today, sitting up in her chair. Patient calm and cooperative. Discussed current therapy needs at radiation plan. Patient wanting to start radiation soon. This will limit options for IPR. Patient also states she just wants to go home, and her niece is available to care for her 24/7. Current Rehabilitation Assessments:  PT:  not ordered  OT:    COGNITION: Slow Processing, Decreased Recall, Decreased Insight, Impaired Memory, Inattention, Decreased Problem Solving, Decreased Safety Awareness, Impaired Attention, and Tangential  Pt oriented to person, partially to place as initially aware she is in [place] [address], then later refers to being at home multiple times. Pt asking if \"kitchen has been mopped, and needing to throw blankets in the laundry. \"   ADL:   No ADL's completed this session. .Pt declined completing at this time despite encouragement. BALANCE:  Sitting Balance:  Supervision. While seated in bedside chair. Minimal cues to correct R lateral lean and reposition. ADDITIONAL ACTIVITIES:  Pt completed BUE AROM exercises while seated in chair. Pt completed 1 set X 15 repetitions in all planes with short rest breaks in between variations. Pt declined progression of exercises to use of min resistance band. Exercises completed to increase overall strength/endurance needed for ADLs and transfers.        Past Medical History:        Diagnosis Date    Anxiety     Blind right eye     Breast cancer ([facility] 05/23/2016    IDC @ [facility] only chemo, no surgery    Breast cancer metastasized to liver (5/16) and brain (7/21) ([facility] 05/10/2016    Liver lesion noted 5/16 : Brain 7/21    Carotid stenosis      Left ICA 25%    Colostomy in place ([facility] 05/27/2016    Degenerative arthritis of cervical spine 05/2007    GERD (gastroesophageal reflux disease) 11/2006    History of craniotomy 07/2021    mets from breast CA    Hx antineoplastic chemo 2016    left breast cancer, no surgery    Hypercholesteremia     Hypoestrogenism     Hypothyroidism     Lumbago     Lumbosacral spinal stenosis 05/2019    MDRO (multiple drug resistant organisms) resistance 2008    pt stated cleared    Metastasis ([address] 2019    from breast to brain    Personal history of cardiac dysrhythmia     Respiratory tract infection due to COVID-19 virus 01/21/2022    Sigmoid diverticulosis 08/2004    Spondylolisthesis of lumbosacral region 08/2004    Steroid-induced hyperglycemia     Subclavian artery stenosis (HCC)     left    Superior and Inferior Pubic ramus fracture, right, closed, initial encounter ([facility] 05/21/2019    SVT (supraventricular tachycardia) ([address] 06/2007    Temporal arteritis ([address]     Vertebral artery occlusion     left    Vitamin D deficiency 06/2017       Past Surgical History:        Procedure Laterality Date    CARDIAC CATHETERIZATION  5/07    CATARACT REMOVAL  right 1/08; left 2/08    CHOLECYSTECTOMY  1/03    COLONOSCOPY  11/06    COLOSTOMY  09/04/2018    REVERSAL OF COLOSTOMY    CRANIOTOMY Right 7/2/2021    CRANIOTOMY FOR RESECTION/DEBULKING OF RIGHT SIDE INTRA BRAIN TUMOR performed by Stuart Felton MD at [address] [place] [address] Right 5/11/2022    RIGHT SIDED CRANIOTOMY FOR TUMOR performed by Stuart Felton MD at [place] [address], COLON, DIAGNOSTIC      EYE REMOVAL Right 03/2017    EYE SURGERY      EYE SURGERY Right 11/06/2017    Dr Licha Hyde in [facility] ([facility]      partial    OTHER SURGICAL HISTORY  05/27/2016    robotic assisted laparoscopic anterior colon resection and end colostomy    GA OFFICE/OUTPT VISIT,PROCEDURE ONLY N/A 9/4/2018    COLOSTOMY REVERSAL AND PARASTOMAL HERNIA REPAIR performed by Juanpablo Muse MD at [facility] / PULMONARY SHUNT  2002    UPPER GASTROINTESTINAL ENDOSCOPY  11/06    US BREAST NEEDLE BIOPSY LEFT Left 05/23/2016    IDC       Allergies:     Allergies   Allergen Reactions    Bactrim [Sulfamethoxazole-Trimethoprim] Swelling     Of tongue    Demerol Rash     nausea    Pcn [Penicillins] Rash        Current Medications:   Current Facility-Administered Medications: albuterol (PROVENTIL) nebulizer solution 2.5 mg, 2.5 mg, Nebulization, Q6H PRN  ALPRAZolam (XANAX) tablet 0.25 mg, 0.25 mg, Oral, BID PRN  amiodarone (CORDARONE) tablet 200 mg, 200 mg, Oral, Daily  calcium-cholecalciferol 500-200 MG-UNIT per tablet 1 tablet, 1 tablet, Oral, BID  docusate sodium (COLACE) capsule 100 mg, 100 mg, Oral, BID  isosorbide mononitrate (IMDUR) extended release tablet 30 mg, 30 mg, Oral, Daily  levETIRAcetam (KEPPRA) tablet 500 mg, 500 mg, Oral, BID  levothyroxine (SYNTHROID) tablet 100 mcg, 100 mcg, Oral, Daily  cetirizine (ZYRTEC) tablet 5 mg, 5 mg, Oral, Daily  melatonin tablet 3 mg, 3 mg, Oral, Nightly PRN  metoprolol tartrate (LOPRESSOR) tablet 12.5 mg, 12.5 mg, Oral, BID  midodrine (PROAMATINE) tablet 2.5 mg, 2.5 mg, Oral, TID WC  multivitamin 1 tablet, 1 tablet, Oral, Daily  pantoprazole (PROTONIX) tablet 40 mg, 40 mg, Oral, QAM AC  polyethylene glycol (GLYCOLAX) packet 17 g, 17 g, Oral, Daily PRN  polyethyl glycol-propyl glycol 0.4-0.3 % (SYSTANE) ophthalmic solution 1 drop, 1 drop, Both Eyes, 4x Daily  sertraline (ZOLOFT) tablet 50 mg, 50 mg, Oral, BID  atorvastatin (LIPITOR) tablet 10 mg, 10 mg, Oral, Nightly  traZODone (DESYREL) tablet 50 mg, 50 mg, Oral, Nightly PRN  [START ON 8/2/2022] vitamin D (ERGOCALCIFEROL) capsule 50,000 Units, 50,000 Units, Oral, Weekly  zinc sulfate (ZINCATE) capsule 50 mg, 50 mg, Oral, Daily  sodium chloride flush 0.9 % injection 5-40 mL, 5-40 mL, IntraVENous, 2 times per day  sodium chloride flush 0.9 % injection 5-40 mL, 5-40 mL, IntraVENous, PRN  0.9 % sodium chloride infusion, , IntraVENous, PRN  ondansetron (ZOFRAN-ODT) disintegrating tablet 4 mg, 4 mg, Oral, Q8H PRN **OR** ondansetron (ZOFRAN) injection 4 mg, 4 mg, IntraVENous, Q6H PRN  acetaminophen (TYLENOL) tablet 650 mg, 650 mg, Oral, Q6H PRN **OR** acetaminophen (TYLENOL) suppository 650 mg, 650 mg, Rectal, Q6H PRN  insulin lispro (HUMALOG) injection vial 0-4 Units, 0-4 Units, SubCUTAneous, TID WC  insulin lispro (HUMALOG) injection vial 0-4 Units, 0-4 Units, SubCUTAneous, Nightly  0.9 % sodium chloride infusion, , IntraVENous, Continuous  glucose chewable tablet 16 g, 4 tablet, Oral, PRN  dextrose bolus 10% 125 mL, 125 mL, IntraVENous, PRN **OR** dextrose bolus 10% 250 mL, 250 mL, IntraVENous, PRN  glucagon (rDNA) injection 1 mg, 1 mg, SubCUTAneous, PRN  dextrose 10 % infusion, , IntraVENous, Continuous PRN  dexamethasone (DECADRON) injection 4 mg, 4 mg, Oral, Q6H    Social History:  Social History     Socioeconomic History    Marital status:      Spouse name: Not on file    Number of children: 0    Years of education: 10th grade     Highest education level: Not on file   Occupational History    Not on file   Tobacco Use    Smoking status: Former     Packs/day: 0.50     Types: Cigarettes    Smokeless tobacco: Never   Vaping Use    Vaping Use: Never used   Substance and Sexual Activity    Alcohol use: No    Drug use: No    Sexual activity: Never   Other Topics Concern    Not on file   Social History Narrative    Not on file     Social Determinants of Health     Financial Resource Strain: Low Risk     Difficulty of Paying Living Expenses: Not very hard   Food Insecurity: No Food Insecurity    Worried About Running Out of Food in the Last Year: Never true    [address] in the Last Year: Never true   Transportation Needs: No Transportation Needs    Lack of Transportation (Medical):  No Lack of Transportation (Non-Medical): No   Physical Activity: Inactive    Days of Exercise per Week: 0 days    Minutes of Exercise per Session: 0 min   Stress: Stress Concern Present    Feeling of Stress :  To some extent   Social Connections: Unknown    Frequency of Communication with Friends and Family: More than three times a week    Frequency of Social Gatherings with Friends and Family: More than three times a week    Attends [employer] Services: Not on file    Active Member of Clubs or Organizations: Not on file    Attends Club or Organization Meetings: Never    Marital Status: Not on file   Intimate Partner Violence: Not on file   Housing Stability: [address] to Pay for Housing in the Last Year: No    Number of [place] in the Last Year: 1    Unstable Housing in the Last Year: No     Lives With: Family (Brother and [place])  Type of Home: House  Home Layout: One level  Home Access: Stairs to enter without rails  Entrance Stairs - Number of Steps: 1 [address] bed, Rollator   Bathroom Shower/Tub: Tub/Shower unit, Shower chair with back  [address] Toilet: Handicap height  Bathroom Accessibility: Accessible     Receives Help From: Personal care attendant (Aides M-F for 2 hrs.)  ADL Assistance: Needs assistance (aides/family assists with dressing, showering, and toileting.)  Homemaking Assistance: Needs assistance  Homemaking Responsibilities: No  Ambulation Assistance: Independent  Transfer Assistance: Independent     Active : No  Patient's  Info: Family drives PRN    Family History:       Problem Relation Age of Onset    Breast Cancer Sister 61        breast    Lung Cancer Brother 79    Colon Cancer Brother 68    Lung Cancer Son 48       Review of Systems:  CONSTITUTIONAL:  positive for  fatigue  EYES: blind right eye, cataracts left  HEENT:  negative  RESPIRATORY:  negative  CARDIOVASCULAR:  negative  GASTROINTESTINAL:  Negative  GENITOURINARY:  negative  SKIN: negative  HEMATOLOGIC/LYMPHATIC:  positive for swelling/edema  MUSCULOSKELETAL:  positive for  muscle weakness  NEUROLOGICAL:  positive for gait problems and weakness  BEHAVIOR/PSYCH:  negative  System review otherwise negative    Physical Exam:  BP (!) 158/87   Pulse 56   Temp 98.4 °F (36.9 °C) (Oral)   Resp 18   Ht 5' (1.524 m)   Wt 134 lb 8 oz (61 kg)   LMP  (LMP Unknown) Comment: age 52 ovaries intact  SpO2 97%   BMI 26.27 kg/m²   awake  Orientation:   person, place, time  Mood: within normal limits  Affect: calm  General appearance: Patient is well nourished, well developed, well groomed and in no acute distress     Memory:   normal,  Attention/Concentration: normal  Language:  normal     Cranial Nerves:  cranial nerves II-XII are grossly intact except chronic vision issues with right eye false and left eye cataracts  ROM:  normal  Tone:  normal  Muscle bulk: within normal limits  Sensory:  Sensory intact    Heart: normal rate, regular rhythm, normal S1, S2, no murmurs, rubs, clicks or gallops  Lungs: clear to auscultation without wheezes or rales  Abdomen: soft, non-tender, non-distended, normal bowel sounds, no masses or organomegaly    Skin: warm and dry, no rash or erythema.    Peripheral vascular: Pulses: Normal upper and lower extremity pulses; Edema: trace      Diagnostics:  Recent Results (from the past 24 hour(s))   POCT glucose    Collection Time: 07/31/22 12:11 PM   Result Value Ref Range    POC Glucose 231 (H) 70 - 108 mg/dl   POCT glucose    Collection Time: 07/31/22  4:24 PM   Result Value Ref Range    POC Glucose 123 (H) 70 - 108 mg/dl   POCT glucose    Collection Time: 07/31/22  7:56 PM   Result Value Ref Range    POC Glucose 195 (H) 70 - 108 mg/dl   POCT glucose    Collection Time: 08/01/22  7:47 AM   Result Value Ref Range    POC Glucose 137 (H) 70 - 108 mg/dl         Impression:  Metastatic breast cancer to the brain  Hx 2 craniotomies for resection right parietal occipital tumor  Legally blind, blind right eye, cataracts left  GERD  Hypothyroidism  HLD  Carotid stenosis    Recommendations:  Continue current therapies  Per neurosurgery and oncology, patient not appropriate for further tumor resection. Planning palliative radiation therapy for brain mets  Patient not appropriate for IPR at this time, unable to accommodate radiation treatments on IPR  Patient wants to go home, she does not wish to stay in hospital for extended amount of time or return to SNF. She wishes to go home. Patient states her niece is planning to stay 24/7 with her. It was my pleasure to evaluate Latanya Rod today. Please call with questions.     Kristofer Uribe, APRN - CNP

## 2022-08-01 NOTE — CONSULTS
Consult noted and completed.  Please see today's note      Electronically signed by PAMELA Pettit CNP on 8/1/2022 at 11:36 AM

## 2022-08-01 NOTE — DISCHARGE SUMMARY
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 *   OTHER PROBLEMS:  GERD  DJD  HLD  Hypothyroidism,    The patient was seen and examined on day of discharge and this discharge summary is in conjunction with any daily progress note from day of discharge. Hospital Course:   Ligia Castillo is a 80 y.o. 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 She had been treated with chemotherapy but no surgery for the left breast CA, she however did undergo craniotomy for resection / debulking of the right-sided intracranial metastatic tumor on . She has a history of GERD, DJD, HLD, hypothyroidism, MDRO resistance. She had presented to the emergency room with generalized weakness over a period of a week, she  has had some mild right-sided headaches and complained of feeling lightheaded, she at times feels like she may pass out. She complained of generalized fatigue no rhinorrhea nasal congestion or sore throat and has had some nausea without vomiting. There has been no fever or chills. There has been no dysuria material flank pains or urgency. Head CT scan obtained in the ED had shown 2 new round and confluent areas of low attenuation in the right parietal lobe and right temporal lobe,  worsening confluent white matter changes are noted within portions of right cerebral hemisphere, findings are compatible with worsening neoplastic disease. Also new since the prior study is a 5-6 mm right to left midline shift in the level of the septum pellucidum. Patient was started on IV dexamethasone and this was transition to oral dexamethasone. She was hydrated and was seen by the radiation oncologist with a plan to initiate XRTof the brain  Mass. MRI of the brain is as noted below. He had been seen by the neurosurgical service who had recommended palliative care for the patient. She was seen by the palliative care service. Patient was seen by the oncology service and had indicated that the patient will be seen in the outpatient setting if she desires to pursue further treatment. She had received PT/ OT. She had been seen by the physiatrist service and was not considered a patient for in-patient rehab due to the XRT needs    Code status had been discussed by the ED physician with the patient while I was on the phone with him and the patient had desired no CPR, no shock no intubation in the event of a cardiopulmonary arrest.  Code status was made DNR CC - A. Patient's niece will be living with the patient so the plan is to discharge her today. Labs: For convenience and continuity at follow-up the following most recent labs are provided:      CBC:    Lab Results   Component Value Date/Time    WBC 7.7 07/28/2022 03:23 AM    HGB 11.5 07/28/2022 03:23 AM    HCT 38.2 07/28/2022 03:23 AM     07/28/2022 03:23 AM       Renal:    Lab Results   Component Value Date/Time     07/28/2022 03:23 AM    K 4.5 07/28/2022 03:23 AM     07/28/2022 03:23 AM    CO2 24 07/28/2022 03:23 AM    BUN 19 07/28/2022 03:23 AM    CREATININE 0.7 07/28/2022 03:23 AM    CALCIUM 9.1 07/28/2022 03:23 AM         Significant Diagnostic Studies    Radiology:   CT Head WO Contrast    Result Date: 7/27/2022  **ADDENDUM #1 ** This report was discussed with Leo Howard RN on Jul 27, 2022 22:18:00 EDT.  This document has been electronically signed by: Vincent Mc on 07/27/2022 10:19 PM **ORIGINAL REPORT **CT BRAIN WITHOUT CONTRAST COMPARISON: 5/23/2022. FINDINGS: Right-sided craniotomy changes are again noted, with decreased postoperative fluid underlying the craniotomy site. Round confluent areas of low attenuation are noted within the right parietal white matter and right temporal lobe which are new since the prior study. For example this measures approximately 2.8 x 2.5 cm in the right parietal lobe on axial image 21, and approximately 3.2 x 2.3 cm in the right temporal lobe on axial image 15. Additionally, there is significantly worsening confluent low attenuation involving the white matter of the right frontal lobe, right occipital lobe, right parietal lobe, and dorsal aspect of the right frontal lobe. Low attenuation also includes portions of the right external capsule and right internal capsule. Overall the findings are compatible with worsening neoplastic disease. New since the prior study, there is right to left midline shift at the level of the septum pellucidum on axial image 18, estimated to measure 5-6 mm. There is no hydrocephalus or downward herniation. There is no acute extra-axial blood. The right globe prosthesis is again noted. 1. There are 2 new round and confluent areas of low attenuation in the right parietal lobe and right temporal lobe, as detailed above. Worsening confluent white matter changes are noted within portions of right cerebral hemisphere, as detailed above. Findings are compatible with worsening neoplastic disease. 2. New since the prior study is approximately 5-6 mm of right-to-left midline shift at the level of the septum pellucidum. No hydrocephalus or downward herniation. 3. Postsurgical changes are again noted with decreasing postoperative fluid underlying the craniotomy site. 4. Neurosurgical consultation is advised.  This document has been electronically signed by: Jude Nunez M.D. on 07/27/2022 10:14 PM All CTs at this facility use dose modulation techniques and iterative reconstructions, and/or weight-based dosing when appropriate to reduce radiation to a low as reasonably achievable. XR CHEST PORTABLE    Result Date: 7/27/2022  1 view chest x-ray Comparison: CR,SR - XR CHEST PORTABLE - 07/06/2022 03:51 PM EDT Findings: No consolidation or effusion. Normal size heart. No acute fracture. 1. No acute findings. This document has been electronically signed by: Dora Faria MD on 07/27/2022 08:51 PM    XR CHEST PORTABLE    Result Date: 7/6/2022  PROCEDURE: XR CHEST PORTABLE CLINICAL INFORMATION: syncope. COMPARISON: Chest x-ray dated 5/11/2022. TECHNIQUE: AP upright view of the chest. FINDINGS: The previously noted right internal jugular catheter is no longer seen There is mild cardiomegaly. . There is atherosclerotic calcification in the aortic arch. There is diffuse COPD with no acute pulmonary infiltrates or effusions. The pulmonary vascularity is normal. There is thoracic spondylosis. 1. The previously noted right internal jugular catheter is no longer seen. 2. Mild cardiomegaly. 3. Diffuse COPD. . **This report has been created using voice recognition software. It may contain minor errors which are inherent in voice recognition technology. ** Final report electronically signed by DR Katerine Bates on 7/6/2022 4:00 PM    MRI BRAIN W WO CONTRAST    Result Date: 7/29/2022  PROCEDURE: MRI BRAIN W WO CONTRAST CLINICAL INFORMATIONBrain [facility], raymond protocol for Radiation planning, eval for current state of intracranial disease. COMPARISON: CT scan of the brain dated 27 July 2022. MRI scan of the brain dated 6/3/2022. TECHNIQUE: Multiplanar and multiple spin echo T1 and T2-weighted images were obtained through the brain before and after the administration of intravenous contrast using Raymond protocol. FINDINGS: There are postoperative changes involving the right posterior temporal/parietal calvarium.  There is a large metastatic lesion in the right posterior temporal lobe measuring 5.3 x 3.2 x 3.4 cm in size, significantly larger than on previous study dated third of June 2022. Estil Moffett There is extensive surrounding edema. There is mass effect upon the right lateral ventricle and mild midline deviation. There is a possible 7 mm incidental meningioma over the left parietal lobe. There is no associated mass effect or edema. The diffusion-weighted images are normal. The brain volume is slightly reduced. There are no intra-or extra-axial collections. There is mild dilatation of the third and lateral ventricles. There is increased signal intensity in the white matter possibly representing ischemic changes of changes of radiation therapy. On the gradient echo T2-weighted images, there is mineralization in the  basal ganglia and cerebral peduncle. No other areas of susceptibility artifact are present. There is no other abnormal enhancement in the brain. The major intracranial vascular flow voids are present. The midline craniocervical junction structures are normal.  The brainstem and pituitary gland are normal.     1. Postoperative changes involving the right posterior temporal/parietal calvarium. 2. Large metastatic lesion in the right posterior temporal lobe patient 5.3 x 3.2 x 3.4 cm in size, significantly larger than on previous study dated third of June 2022. Extensive surrounding edema and mass effect upon the right lateral ventricle with midline deviation towards the left side. 3. Increased signal intensity in the white matter. 4. Possible small incidental meningioma over the left parietal lobe, unchanged. No associated mass effect or edema. **This report has been created using voice recognition software. It may contain minor errors which are inherent in voice recognition technology. ** Final report electronically signed by DR Siria Coleman on 7/29/2022 3:18 PM         Consults:     IP CONSULT TO NEUROSURGERY  IP CONSULT TO RADIATION ONCOLOGY  IP CONSULT TO ONCOLOGY  PALLIATIVE CARE EVAL  IP CONSULT TO SPIRITUAL SERVICES  IP CONSULT TO SOCIAL WORK  IP CONSULT TO HOSPICE  IP CONSULT TO PHYSICAL MEDICINE REHAB  IP CONSULT TO HOME CARE NEEDS    Disposition:    [x] Home       [] TCU       [] Rehab       [] Psych       [] SNF       [] [place]       [] Other-    Condition at Discharge: Stable    Code Status:  Limited x 4     Patient Instructions:    Discharge lab work: Activity: activity as tolerated  Diet: ADULT DIET; Regular; Low Fat/Low Chol/High Fiber/LIANET      Follow-up visits:   CM [facility]  [facility] [address]  [phone]           Discharge Medications:        Medication List        START taking these medications      HYDROcodone-acetaminophen 5-325 MG per tablet  Commonly known as: Norco  Take 1 tablet by mouth every 6 hours as needed for Pain for up to 7 days. Intended supply: 3 days. Take lowest dose possible to manage pain            CONTINUE taking these medications      * albuterol sulfate  (90 Base) MCG/ACT inhaler  Commonly known as: PROVENTIL;VENTOLIN;PROAIR  Inhale 2 puffs into the lungs every 6 hours as needed for Wheezing or Shortness of Breath     * albuterol (2.5 MG/3ML) 0.083% nebulizer solution  Commonly known as: PROVENTIL  Take 3 mLs by nebulization every 6 hours as needed for Wheezing     ALPRAZolam 0.25 MG tablet  Commonly known as: XANAX  take 1 tablet by mouth twice a day if needed for anxiety     amiodarone 200 MG tablet  Commonly known as: CORDARONE  Take 1 tablet by mouth in the morning.      Centrum Silver Adult 50+ Tabs  Take 1 tablet by mouth daily     Compressor/Nebulizer Misc  1 Device by Does not apply route 4 times daily as needed (Shortness of breath and wheezing)     docusate 100 MG Caps  Commonly known as: COLACE, DULCOLAX  Take 100 mg by mouth 2 times daily     Ensure Original Liqd  Take 1 Can by mouth 2 times daily     isosorbide mononitrate 30 MG extended release Ceci [place] your doctor about these medications      * dexamethasone 2 MG tablet  Commonly known as: DECADRON  Take 1 tablet by mouth daily for 10 days  Ask about: Which instructions should I use? * dexamethasone 6 MG tablet  Commonly known as: DECADRON  Take 1 tablet by mouth daily for 7 days  Ask about: Which instructions should I use? * dexamethasone 4 MG tablet  Commonly known as: Decadron  Take 1 tablet by mouth in the morning and 1 tablet at noon and 1 tablet in the evening and 1 tablet before bedtime. Do all this for 20 days. Ask about: Which instructions should I use? * This list has 3 medication(s) that are the same as other medications prescribed for you. Read the directions carefully, and ask your doctor or other care provider to review them with you. Where to Get Your Medications        These medications were sent to [address]  [address] [place] [address] 1st Floor, [facility] [place] [address]      Phone: [phone]   dexamethasone 4 MG tablet       These medications were sent to [address] [place] [address] [id number] [place] [address], [address] [place] [address] [address] [place] [address]  [address] [facility], [address] [place] [id number]      Phone: [phone]   dexamethasone 2 MG tablet  dexamethasone 6 MG tablet       You can get these medications from any pharmacy    Bring a paper prescription for each of these medications  HYDROcodone-acetaminophen 5-325 MG per tablet         Time Spent on discharge is more than 35 minutes in the examination, evaluation, counseling and review of medications and discharge plan. Signed: Thank you Estephania Osman MD for the opportunity to be involved in this patient's care.     Electronically signed by Nunu Coreas MD on 8/1/2022 at 1:32 PM

## 2022-08-01 NOTE — DISCHARGE INSTR - DIET

## 2022-08-01 NOTE — PLAN OF CARE
Problem: Discharge Planning  Goal: Discharge to home or other facility with appropriate resources  8/1/2022 0253 by Edith Castillo RN  Outcome: Progressing  Flowsheets  Taken 8/1/2022 0253  Discharge to home or other facility with appropriate resources:   Identify barriers to discharge with patient and caregiver   Arrange for needed discharge resources and transportation as appropriate  Taken 7/31/2022 2015  Discharge to home or other facility with appropriate resources: Identify barriers to discharge with patient and caregiver     Problem: ABCDS Injury Assessment  Goal: Absence of physical injury  8/1/2022 0253 by Edith Castillo RN  Outcome: Progressing  Flowsheets (Taken 8/1/2022 0240)  Absence of Physical Injury: Implement safety measures based on patient assessment     Problem: Skin/Tissue Integrity  Goal: Absence of new skin breakdown  Description: 1. Monitor for areas of redness and/or skin breakdown  2. Assess vascular access sites hourly  3. Every 4-6 hours minimum:  Change oxygen saturation probe site  4. Every 4-6 hours:  If on nasal continuous positive airway pressure, respiratory therapy assess nares and determine need for appliance change or resting period. 8/1/2022 0253 by Edith Castillo RN  Outcome: Progressing     Problem: Safety - Adult  Goal: Free from fall injury  Outcome: Progressing  Flowsheets  Taken 8/1/2022 0253  Free From Fall Injury:   Instruct family/caregiver on patient safety   Based on caregiver fall risk screen, instruct family/caregiver to ask for assistance with transferring infant if caregiver noted to have fall risk factors  Taken 8/1/2022 0240   Free From Fall Injury: Instruct family/caregiver on patient safety     Care plan reviewed with patient. Patient verbalizes understanding of the care plan and contributed to goal setting.

## 2022-08-02 ENCOUNTER — CARE COORDINATION (OUTPATIENT)
Dept: CASE MANAGEMENT | Age: 87
End: 2022-08-02

## 2022-08-02 NOTE — CARE COORDINATION
Care Transitions Initial Outreach Attempt-1st attempt    Call within 2 business days of discharge: Yes   Attempted initial 24 hour transitional call to patient. Left VM to return call directly to [phone]. Tried brother phone, VM not set up. Her brother is the only one on HIPPA form from 2022. Will attempt again. Confirmed HH with [facility] Sim[facility] 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 .. ED to Hosp-Admission (Discharged) (ADMITTED) JOSE MIGUEL 3B Littie Dakins, MD; Christy Fan . .. Challenges to be reviewed by the provider   Additional needs identified to be addressed with provider Yes  Needs 7 day hospital f/u.                    Noted following upcoming appointments from discharge chart review:   [facility] follow up appointment(s):   Future Appointments   Date Time Provider Irene Shah   2022  4:00 PM STR MRI RM1 STRZ MRI STR Radiolog   2022 11:00 AM SCHEDULE, STRZ STEFAN CT SIM [place] [address] [facility]   2022  1:30 PM Alix Boucher, APRN - [facility] [facility]   10/11/2022  1:30 PM Hari Holly MD [facility] [place] [address]     Non-[facility] follow up appointment(s): na

## 2022-08-03 ENCOUNTER — CARE COORDINATION (OUTPATIENT)
Dept: CASE MANAGEMENT | Age: 87
End: 2022-08-03

## 2022-08-03 ENCOUNTER — APPOINTMENT (OUTPATIENT)
Dept: RADIATION ONCOLOGY | Age: 87
End: 2022-08-03
Payer: MEDICARE

## 2022-08-03 DIAGNOSIS — R53.1 GENERALIZED WEAKNESS: Primary | ICD-10-CM

## 2022-08-03 PROCEDURE — 1111F DSCHRG MED/CURRENT MED MERGE: CPT | Performed by: PHYSICAL MEDICINE & REHABILITATION

## 2022-08-04 ENCOUNTER — CARE COORDINATION (OUTPATIENT)
Dept: CASE MANAGEMENT | Age: 87
End: 2022-08-04

## 2022-08-04 ENCOUNTER — HOSPITAL ENCOUNTER (EMERGENCY)
Age: 87
Discharge: HOME | End: 2022-08-04
Attending: EMERGENCY MEDICINE
Payer: MEDICARE

## 2022-08-04 VITALS
OXYGEN SATURATION: 88 % | TEMPERATURE: 98.5 F | HEART RATE: 59 BPM | DIASTOLIC BLOOD PRESSURE: 63 MMHG | WEIGHT: 135 LBS | RESPIRATION RATE: 20 BRPM | BODY MASS INDEX: 26.37 KG/M2 | SYSTOLIC BLOOD PRESSURE: 170 MMHG

## 2022-08-04 DIAGNOSIS — R11.2 NON-INTRACTABLE VOMITING WITH NAUSEA, UNSPECIFIED VOMITING TYPE: Primary | ICD-10-CM

## 2022-08-04 LAB
ALBUMIN SERPL-MCNC: 3.4 G/DL (ref 3.5–5.1)
ALP BLD-CCNC: 46 U/L (ref 38–126)
ALT SERPL-CCNC: 40 U/L (ref 11–66)
ANION GAP SERPL CALCULATED.3IONS-SCNC: 12 MEQ/L (ref 8–16)
AST SERPL-CCNC: 32 U/L (ref 5–40)
BASOPHILS # BLD: 0.2 %
BASOPHILS ABSOLUTE: 0 THOU/MM3 (ref 0–0.1)
BILIRUB SERPL-MCNC: 0.7 MG/DL (ref 0.3–1.2)
BILIRUBIN DIRECT: < 0.2 MG/DL (ref 0–0.3)
BUN BLDV-MCNC: 38 MG/DL (ref 7–22)
CALCIUM SERPL-MCNC: 9 MG/DL (ref 8.5–10.5)
CHLORIDE BLD-SCNC: 101 MEQ/L (ref 98–111)
CO2: 23 MEQ/L (ref 23–33)
CREAT SERPL-MCNC: 0.7 MG/DL (ref 0.4–1.2)
EOSINOPHIL # BLD: 0.2 %
EOSINOPHILS ABSOLUTE: 0 THOU/MM3 (ref 0–0.4)
ERYTHROCYTE [DISTWIDTH] IN BLOOD BY AUTOMATED COUNT: 15.6 % (ref 11.5–14.5)
ERYTHROCYTE [DISTWIDTH] IN BLOOD BY AUTOMATED COUNT: 53 FL (ref 35–45)
GFR SERPL CREATININE-BSD FRML MDRD: 79 ML/MIN/1.73M2
GLUCOSE BLD-MCNC: 166 MG/DL (ref 70–108)
HCT VFR BLD CALC: 43.7 % (ref 37–47)
HEMOGLOBIN: 13.4 GM/DL (ref 12–16)
IMMATURE GRANS (ABS): 0.1 THOU/MM3 (ref 0–0.07)
IMMATURE GRANULOCYTES: 0.8 %
LIPASE: 22.3 U/L (ref 5.6–51.3)
LYMPHOCYTES # BLD: 5.2 %
LYMPHOCYTES ABSOLUTE: 0.7 THOU/MM3 (ref 1–4.8)
MCH RBC QN AUTO: 28.2 PG (ref 26–33)
MCHC RBC AUTO-ENTMCNC: 30.7 GM/DL (ref 32.2–35.5)
MCV RBC AUTO: 92 FL (ref 81–99)
MONOCYTES # BLD: 7.4 %
MONOCYTES ABSOLUTE: 0.9 THOU/MM3 (ref 0.4–1.3)
NUCLEATED RED BLOOD CELLS: 0 /100 WBC
OSMOLALITY CALCULATION: 284.8 MOSMOL/KG (ref 275–300)
PLATELET # BLD: 153 THOU/MM3 (ref 130–400)
PMV BLD AUTO: 11.7 FL (ref 9.4–12.4)
POTASSIUM REFLEX MAGNESIUM: 4.8 MEQ/L (ref 3.5–5.2)
RBC # BLD: 4.75 MILL/MM3 (ref 4.2–5.4)
SEG NEUTROPHILS: 86.2 %
SEGMENTED NEUTROPHILS ABSOLUTE COUNT: 10.9 THOU/MM3 (ref 1.8–7.7)
SODIUM BLD-SCNC: 136 MEQ/L (ref 135–145)
TOTAL PROTEIN: 5.9 G/DL (ref 6.1–8)
TROPONIN T: < 0.01 NG/ML
WBC # BLD: 12.6 THOU/MM3 (ref 4.8–10.8)

## 2022-08-04 PROCEDURE — 93005 ELECTROCARDIOGRAM TRACING: CPT | Performed by: STUDENT IN AN ORGANIZED HEALTH CARE EDUCATION/TRAINING PROGRAM

## 2022-08-04 PROCEDURE — 80048 BASIC METABOLIC PNL TOTAL CA: CPT

## 2022-08-04 PROCEDURE — 83690 ASSAY OF LIPASE: CPT

## 2022-08-04 PROCEDURE — 96375 TX/PRO/DX INJ NEW DRUG ADDON: CPT

## 2022-08-04 PROCEDURE — 85025 COMPLETE CBC W/AUTO DIFF WBC: CPT

## 2022-08-04 PROCEDURE — 80076 HEPATIC FUNCTION PANEL: CPT

## 2022-08-04 PROCEDURE — 6360000002 HC RX W HCPCS: Performed by: STUDENT IN AN ORGANIZED HEALTH CARE EDUCATION/TRAINING PROGRAM

## 2022-08-04 PROCEDURE — 96374 THER/PROPH/DIAG INJ IV PUSH: CPT

## 2022-08-04 PROCEDURE — 84484 ASSAY OF TROPONIN QUANT: CPT

## 2022-08-04 PROCEDURE — 36415 COLL VENOUS BLD VENIPUNCTURE: CPT

## 2022-08-04 PROCEDURE — 99284 EMERGENCY DEPT VISIT MOD MDM: CPT

## 2022-08-04 PROCEDURE — 2580000003 HC RX 258: Performed by: STUDENT IN AN ORGANIZED HEALTH CARE EDUCATION/TRAINING PROGRAM

## 2022-08-04 RX ORDER — 0.9 % SODIUM CHLORIDE 0.9 %
1000 INTRAVENOUS SOLUTION INTRAVENOUS ONCE
Status: COMPLETED | OUTPATIENT
Start: 2022-08-04 | End: 2022-08-04

## 2022-08-04 RX ORDER — ONDANSETRON 2 MG/ML
4 INJECTION INTRAMUSCULAR; INTRAVENOUS ONCE
Status: COMPLETED | OUTPATIENT
Start: 2022-08-04 | End: 2022-08-04

## 2022-08-04 RX ORDER — DEXAMETHASONE SODIUM PHOSPHATE 4 MG/ML
4 INJECTION, SOLUTION INTRA-ARTICULAR; INTRALESIONAL; INTRAMUSCULAR; INTRAVENOUS; SOFT TISSUE ONCE
Status: COMPLETED | OUTPATIENT
Start: 2022-08-04 | End: 2022-08-04

## 2022-08-04 RX ADMIN — ONDANSETRON 4 MG: 2 INJECTION INTRAMUSCULAR; INTRAVENOUS at 17:43

## 2022-08-04 RX ADMIN — DEXAMETHASONE SODIUM PHOSPHATE 4 MG: 4 INJECTION, SOLUTION INTRA-ARTICULAR; INTRALESIONAL; INTRAMUSCULAR; INTRAVENOUS; SOFT TISSUE at 18:14

## 2022-08-04 RX ADMIN — SODIUM CHLORIDE 1000 ML: 9 INJECTION, SOLUTION INTRAVENOUS at 17:42

## 2022-08-04 ASSESSMENT — ENCOUNTER SYMPTOMS
SORE THROAT: 0
VOMITING: 1
BACK PAIN: 0
ABDOMINAL PAIN: 0
TROUBLE SWALLOWING: 0
BLOOD IN STOOL: 0
NAUSEA: 1
DIARRHEA: 0
COUGH: 0
SHORTNESS OF BREATH: 0

## 2022-08-04 ASSESSMENT — PAIN SCALES - GENERAL
PAINLEVEL_OUTOF10: 8
PAINLEVEL_OUTOF10: 9

## 2022-08-04 ASSESSMENT — PAIN - FUNCTIONAL ASSESSMENT
PAIN_FUNCTIONAL_ASSESSMENT: 0-10
PAIN_FUNCTIONAL_ASSESSMENT: 0-10

## 2022-08-04 NOTE — CARE COORDINATION
Spoke with patient about how things were working out being at home. Patient advised niece is taking great care of her and she is so happy to be at home. Denies need for palliative or hospice care at this time. This information was presented to Dr. Cipriano Santoyo.

## 2022-08-04 NOTE — CARE COORDINATION
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  Said yesterday after she woke up, she didn't have much n/v. Was able to take her meds and eat/drink but when she takes her bedtime medication, she is good until about 6 am, starts vomiting. She has her sitting in a chair tilted a little and is doing ok but tried to give her a pain pill for headache after she vomited but it came right back up. Tried again but same thing happened. Phenergan is not on her med list but has about 4-5 doses left at home which I told her PCP responded to take every 6 hrs ATC. Said the [facility] nurse said she is calling the PCP and see if there is something else that can be given, said she will see her this afternoon. Discussed f/u appt for next week. Agreeable for ACM to continue to follow, sent to [facility]. No other questions or concerns at this time.       Care Transitions Subsequent and Final Call    Schedule Follow Up Appointment with PCP: Completed  Subsequent and Final Calls  Do you have any ongoing symptoms?: Yes  Onset of Patient-reported symptoms: Yesterday  Patient-reported symptoms: Nausea, Vomiting  Interventions for patient-reported symptoms: Notified Home Care  Have your medications changed?: No  Do you have any questions related to your medications?: No  Do you currently have any active services?: Yes  Are you currently active with any services?: Home Health  Do you have any needs or concerns that I can assist you with?: No  Identified Barriers: Lack of Education, Medication Side Effects, Impairment  Care Transitions Interventions     Transportation Support: Declined   Other Interventions:         Care Transitions Follow Up Call    Needs to be reviewed by the provider   Additional needs identified to be addressed with provider: No  none             Method of communication with provider : none      Care Transition Nurse contacted the family by telephone to follow up after admission on 22. Verified name and  with family as identifiers. Addressed changes since last contact: none  Discussed follow-up appointments. If no appointment was previously scheduled, appointment scheduling offered: Yes. Is follow up appointment scheduled within 7 days of discharge? No.    Advance Care Planning:   Does patient have an Advance Directive: reviewed and current. CTN reviewed discharge instructions, medical action plan and red flags with patient and discussed any barriers to care and/or understanding of plan of care after discharge. Discussed appropriate site of care based on symptoms and resources available to patient including: PCP  Specialist  Home health. The family agrees to contact the PCP office for questions related to their healthcare. Patients top risk factors for readmission: lack of knowledge about disease  medical condition-Afib, breast Ca with mets to liver, brain  polypharmacy  Interventions to address risk factors: Scheduled appointment with PCP- and Scheduled appointment with Specialist-      [facility] follow up appointment(s): na    CTN provided contact information for future needs. No further follow-up call indicated based on severity of symptoms and risk factors. Plan for next call: symptom management-vomiting  referral to ambulatory care manager-sent to [facility].          Follow Up  Future Appointments   Date Time Provider Irene Shah   2022  2:45 PM Amber [facility] STEFAN [facility] SIM STRZ ST[facility]AN [facility]   2022  1:50 PM Trang Chaney MD [facility] [address]   2022  4:00 PM STR MRI RM1 STRZ MRI STR Radiolog   2022  1:30 PM Julieth Connell, APRN - [address] [facility]

## 2022-08-04 NOTE — ED PROVIDER NOTES
[address] [address]          Pt Name: Delia Tam  MRN: [id number]  [place] 5/29/1933  Date of evaluation: 8/4/2022  Treating Resident Physician: Francisco Martins MD  Supervising Physician: Kristie Cruz [id number]       Chief Complaint   Patient presents with    Emesis    Abdominal Pain     History obtained from the patient. HISTORY OF PRESENT ILLNESS    HPI  Delia Tam is a 80 y.o. female with PMHx of left-sided breast cancer which has metastasized to the liver and brain, GERD, degenerative disc disease, hyperlipidemia, hypothyroidism, MDRO resistance who presents to the emergency department for evaluation of SOB, emesis and abdominal pain. Patient reports worsening headache associate with nausea and vomiting for the past 2 days. States that she can no longer take her oral medications or per Methasone secondary to the nausea. Patient was admitted on 7/27 for headaches whereby CT scan showed 2 new round metastatic areas 1 in the right parietal lobe and 1 in the right temporal lobe. There is also 5 to 6 mm of right to left midline shift. While inpatient, was started on oral dexamethasone and neurosurgery recommended palliative care. The patient has no other acute complaints at this time. REVIEW OF SYSTEMS   Review of Systems   Constitutional:  Negative for chills, diaphoresis, fatigue and fever. HENT:  Negative for sore throat and trouble swallowing. Eyes:  Negative for visual disturbance. Respiratory:  Negative for cough and shortness of breath. Cardiovascular:  Negative for chest pain, palpitations and leg swelling. Gastrointestinal:  Positive for nausea and vomiting. Negative for abdominal pain, blood in stool and diarrhea. Genitourinary:  Negative for dysuria, hematuria and urgency. Musculoskeletal:  Negative for arthralgias, back pain, myalgias and neck pain. Skin:  Negative for rash.    Neurological: Positive for headaches. Negative for seizures, syncope, weakness and numbness.        PAST MEDICAL AND SURGICAL HISTORY     Past Medical History:   Diagnosis Date    Anxiety     Blind right eye     Breast cancer ([address] 05/23/2016    IDC @ [facility] only chemo, no surgery    Breast cancer metastasized to liver (5/16) and brain (7/21) ([address] 05/10/2016    Liver lesion noted 5/16 : Brain 7/21    Carotid stenosis      Left ICA 25%    Colostomy in place ([address] 05/27/2016    Degenerative arthritis of cervical spine 05/2007    GERD (gastroesophageal reflux disease) 11/2006    History of craniotomy 07/2021    mets from breast CA    Hx antineoplastic chemo 2016    left breast cancer, no surgery    Hypercholesteremia     Hypoestrogenism     Hypothyroidism     Lumbago     Lumbosacral spinal stenosis 05/2019    MDRO (multiple drug resistant organisms) resistance 2008    pt stated cleared    Metastasis ([facility] 2019    from breast to brain    Personal history of cardiac dysrhythmia     Respiratory tract infection due to COVID-19 virus 01/21/2022    Sigmoid diverticulosis 08/2004    Spondylolisthesis of lumbosacral region 08/2004    Steroid-induced hyperglycemia     Subclavian artery stenosis (HCC)     left    Superior and Inferior Pubic ramus fracture, right, closed, initial encounter ([facility] 05/21/2019    SVT (supraventricular tachycardia) ([facility] 06/2007    Temporal arteritis (HCC)     Vertebral artery occlusion     left    Vitamin D deficiency 06/2017     Past Surgical History:   Procedure Laterality Date    CARDIAC CATHETERIZATION  5/07    CATARACT REMOVAL  right 1/08; left 2/08    CHOLECYSTECTOMY  1/03    COLONOSCOPY  11/06    COLOSTOMY  09/04/2018    REVERSAL OF COLOSTOMY    CRANIOTOMY Right 7/2/2021    CRANIOTOMY FOR RESECTION/DEBULKING OF RIGHT SIDE INTRA BRAIN TUMOR performed by Kit Salmon MD at [address] [facility] Right 5/11/2022    RIGHT SIDED CRANIOTOMY FOR TUMOR performed by Kit Salmon MD at [facility] UTERUS      ENDOSCOPY, COLON, DIAGNOSTIC      EYE REMOVAL Right 03/2017    EYE SURGERY      EYE SURGERY Right 11/06/2017    Dr Kena Leon in [facility] [place] ([facility])      partial    OTHER SURGICAL HISTORY  05/27/2016    robotic assisted laparoscopic anterior colon resection and end colostomy    FL OFFICE/OUTPT VISIT,PROCEDURE ONLY N/A 9/4/2018    COLOSTOMY REVERSAL AND PARASTOMAL HERNIA REPAIR performed by Salome Hand MD at [facility] / PULMONARY SHUNT  2002    UPPER GASTROINTESTINAL ENDOSCOPY  11/06     BREAST NEEDLE BIOPSY LEFT Left 05/23/2016    IDC         MEDICATIONS   No current facility-administered medications for this encounter. Current Outpatient Medications:     dexamethasone (DECADRON) 4 MG tablet, Take 1 tablet by mouth in the morning and 1 tablet at noon and 1 tablet in the evening and 1 tablet before bedtime. Do all this for 20 days. , Disp: 80 tablet, Rfl: 1    HYDROcodone-acetaminophen (NORCO) 5-325 MG per tablet, Take 1 tablet by mouth every 6 hours as needed for Pain for up to 7 days. Intended supply: 3 days.  Take lowest dose possible to manage pain, Disp: 28 tablet, Rfl: 0    amiodarone (CORDARONE) 200 MG tablet, Take 1 tablet by mouth in the morning., Disp: 30 tablet, Rfl: 0    midodrine (PROAMATINE) 2.5 MG tablet, Take 1 tablet by mouth 3 times daily, Disp: 90 tablet, Rfl: 3    sertraline (ZOLOFT) 50 MG tablet, take 1 tablet by mouth twice a day, Disp: 180 tablet, Rfl: 1    Calcium Carbonate-Vitamin D (OYSTER SHELL CALCIUM/D) 500-200 MG-UNIT TABS, take 1 tablet by mouth twice a day, Disp: 180 tablet, Rfl: 3    RA BIOTIN 1000 MCG TABS, Take 1,000 mcg by mouth 2 times daily, Disp: 60 tablet, Rfl: 5    vitamin D (ERGOCALCIFEROL) 1.25 MG (29577 UT) CAPS capsule, take 1 capsule by mouth every week, Disp: 5 capsule, Rfl: 5    metoprolol tartrate (LOPRESSOR) 25 MG tablet, Take 0.5 tablets by mouth 2 times daily, Disp: 60 tablet, Rfl: 3    levothyroxine (SYNTHROID) 100 MCG tablet, take 1 tablet by mouth once daily, Disp: 90 tablet, Rfl: 0    isosorbide mononitrate (IMDUR) 30 MG extended release tablet, take 1 tablet by mouth once daily, Disp: 90 tablet, Rfl: 1    simvastatin (ZOCOR) 20 MG tablet, take 1 tablet by mouth every evening, Disp: 90 tablet, Rfl: 1    docusate (COLACE, DULCOLAX) 100 MG CAPS, Take 100 mg by mouth 2 times daily, Disp: 60 capsule, Rfl: 1    Nebulizers (COMPRESSOR/NEBULIZER) MISC, 1 Device by Does not apply route 4 times daily as needed (Shortness of breath and wheezing), Disp: 1 each, Rfl: 3    albuterol (PROVENTIL) (2.5 MG/3ML) 0.083% nebulizer solution, Take 3 mLs by nebulization every 6 hours as needed for Wheezing, Disp: 120 each, Rfl: 3    polyethylene glycol (GLYCOLAX) 17 GM/SCOOP powder, Take 17 g by mouth daily as needed (constipation), Disp: 510 g, Rfl: 5    pantoprazole (PROTONIX) 40 MG tablet, take 1 tablet by mouth once daily, Disp: 90 tablet, Rfl: 1    zinc sulfate (ZINCATE) 220 (50 Zn) MG capsule, Take 1 capsule by mouth daily, Disp: 30 capsule, Rfl: 0    albuterol sulfate  (90 Base) MCG/ACT inhaler, Inhale 2 puffs into the lungs every 6 hours as needed for Wheezing or Shortness of Breath, Disp: 18 g, Rfl: 3    levETIRAcetam (KEPPRA) 500 MG tablet, take 1 tablet by mouth twice a day, Disp: 240 tablet, Rfl: 2    loratadine (CLARITIN) 10 MG tablet, take 1 tablet by mouth once daily, Disp: 90 tablet, Rfl: 1    REFRESH PLUS 0.5 % SOLN ophthalmic solution, use as directed, Disp: , Rfl:     ondansetron (ZOFRAN-ODT) 4 MG disintegrating tablet, Take 4 mg by mouth every 8 hours as needed, Disp: , Rfl:     melatonin 3 MG TABS tablet, Take 1 tablet by mouth nightly as needed (insomnia), Disp: 30 tablet, Rfl: 3    Nutritional Supplements (ENSURE ORIGINAL) LIQD, Take 1 Can by mouth 2 times daily, Disp: 60 Bottle, Rfl: 5    Multiple Vitamins-Minerals (CENTRUM SILVER ADULT 50+) TABS, Take 1 tablet by mouth daily, Disp: 90 tablet, Rfl: 1      SOCIAL Normal pulses. Heart sounds: Normal heart sounds. No murmur heard. No friction rub. No gallop. Pulmonary:      Effort: Pulmonary effort is normal. No respiratory distress. Breath sounds: Normal breath sounds. Abdominal:      Palpations: Abdomen is soft. Tenderness: There is no abdominal tenderness. There is no right CVA tenderness, left CVA tenderness, guarding or rebound. Musculoskeletal:         General: No swelling or tenderness. Normal range of motion. Cervical back: Normal range of motion and neck supple. Right lower leg: No edema. Left lower leg: No edema. Skin:     General: Skin is warm and dry. Capillary Refill: Capillary refill takes less than 2 seconds. Neurological:      General: No focal deficit present. Mental Status: She is alert and oriented to person, place, and time. Cranial Nerves: No cranial nerve deficit. Motor: No weakness. MEDICAL DECISION MAKING   Initial Assessment:   80-year-old female presenting from home with chief complaint nausea vomiting. This patient has known metastatic lesions to the brain with mass-effect and midline shift. Non-Operable. Patient sent by her oncologist for IV Decadron, Zofran and fluid      Given the patient's above chief complaint and findings on history and physical examination, I thought it was appropriate to consider the following emergency medical conditions:  Mass-effect secondary to malignancy, malignancy, increased ICP, chemotherapy side effect, pancreatitis, hepatitis, sepsis  Although some of these diagnoses are unlikely they were considered in my medical decision making. I spoke with the patient's oncologist, Dr. Poli Montoya. He wanted the patient to receive IV Decadron, Zofran and fluids given the nausea or vomiting. We did not repeat neuroimaging as neurosurgery has signed off. And patient is in palliative care.   The patient had a benign abdominal exam.  Mild leukocytosis but no fevers. Patient was hypoxic at 88% however this was because staff did not know if she wore oxygen at home. She maintain saturations on 2 L nasal cannula which is what she is normally on. Patient reports that she feels better after receiving IV Zofran. States that she would like to go home. She spoke with her  and lives with her niece who takes care of her.   She ultimately plans to pass at home and not in the hospital.      ED RESULTS   Laboratory results:  Labs Reviewed   CBC WITH AUTO DIFFERENTIAL - Abnormal; Notable for the following components:       Result Value    WBC 12.6 (*)     MCHC 30.7 (*)     RDW-CV 15.6 (*)     RDW-SD 53.0 (*)     Segs Absolute 10.9 (*)     Lymphocytes Absolute 0.7 (*)     Immature Grans (Abs) 0.10 (*)     All other components within normal limits   BASIC METABOLIC PANEL W/ REFLEX TO MG FOR LOW K - Abnormal; Notable for the following components:    Glucose 166 (*)     BUN 38 (*)     All other components within normal limits   HEPATIC FUNCTION PANEL - Abnormal; Notable for the following components:    Albumin 3.4 (*)     Total Protein 5.9 (*)     All other components within normal limits   GLOMERULAR FILTRATION RATE, ESTIMATED - Abnormal; Notable for the following components:    Est, Glom Filt Rate 79 (*)     All other components within normal limits   CULTURE, URINE   LIPASE   TROPONIN   ANION GAP   OSMOLALITY   URINALYSIS WITH MICROSCOPIC       Radiologic studies results:  No orders to display       ED Medications administered this visit:   Medications   0.9 % sodium chloride bolus (1,000 mLs IntraVENous New Bag 8/4/22 1742)   ondansetron (ZOFRAN) injection 4 mg (4 mg IntraVENous Given 8/4/22 1743)   dexamethasone (DECADRON) injection 4 mg (4 mg IntraVENous Given 8/4/22 1814)         ED COURSE     ED Course as of 08/04/22 1922   Thu Aug 04, 2022   1910 Troponin T: < 0.010 [TM]      ED Course User Index  [TM] Daya Ngo MD             Strict return precautions and follow up instructions were discussed with the patient prior to discharge, with which the patient agrees. MEDICATION CHANGES     New Prescriptions    No medications on file         FINAL DISPOSITION     Final diagnoses:   Non-intractable vomiting with nausea, unspecified vomiting type     Condition: condition: stable  Dispo: Discharge to home      This transcription was electronically signed. Parts of this transcriptions may have been dictated by use of voice recognition software and electronically transcribed, and parts may have been transcribed with the assistance of an ED scribe. The transcription may contain errors not detected in proofreading. Please refer to my supervising physician's documentation if my documentation differs.     Electronically Signed: Nicklas Lefort, MD, 08/04/22, 7:22 PM         Daya Ngo MD  Resident  08/04/22 [id number]

## 2022-08-04 NOTE — ED NOTES
Pt resting quietly in room no needs expressed. Side rails up x2 with call light in reach.      Ale Langford RN  08/04/22 [id number]

## 2022-08-04 NOTE — ED TRIAGE NOTES
Pt to ED due to shortness of breath, abdominal pain and nausea. Pt states that she recently started promethazine and since then has been vomiting. Upon arrival pt states that she is feeing short of breath. O2 on RA at 88%. This RN placed the pt on 3L NC.pt states that she feels very tired.

## 2022-08-05 ENCOUNTER — CARE COORDINATION (OUTPATIENT)
Dept: CARE COORDINATION | Age: 87
End: 2022-08-05

## 2022-08-05 LAB
EKG ATRIAL RATE: 63 BPM
EKG P AXIS: -71 DEGREES
EKG P-R INTERVAL: 142 MS
EKG Q-T INTERVAL: 424 MS
EKG QRS DURATION: 80 MS
EKG QTC CALCULATION (BAZETT): 433 MS
EKG R AXIS: -7 DEGREES
EKG T AXIS: 146 DEGREES
EKG VENTRICULAR RATE: 63 BPM

## 2022-08-05 PROCEDURE — 93010 ELECTROCARDIOGRAM REPORT: CPT | Performed by: INTERNAL MEDICINE

## 2022-08-08 ENCOUNTER — HOSPITAL ENCOUNTER (OUTPATIENT)
Dept: RADIATION ONCOLOGY | Age: 87
Discharge: HOME | End: 2022-08-08
Payer: MEDICARE

## 2022-08-08 ENCOUNTER — HOSPITAL ENCOUNTER (OUTPATIENT)
Dept: CT IMAGING | Age: 87
Discharge: HOME | End: 2022-08-08

## 2022-08-08 ENCOUNTER — CARE COORDINATION (OUTPATIENT)
Dept: CARE COORDINATION | Age: 87
End: 2022-08-08

## 2022-08-08 DIAGNOSIS — C79.49 SECONDARY MALIGNANT NEOPLASM OF BRAIN AND SPINAL CORD (HCC): ICD-10-CM

## 2022-08-08 DIAGNOSIS — C79.31 SECONDARY MALIGNANT NEOPLASM OF BRAIN AND SPINAL CORD (HCC): ICD-10-CM

## 2022-08-08 PROCEDURE — 77290 THER RAD SIMULAJ FIELD CPLX: CPT | Performed by: RADIOLOGY

## 2022-08-08 PROCEDURE — 77334 RADIATION TREATMENT AID(S): CPT | Performed by: RADIOLOGY

## 2022-08-08 PROCEDURE — 3209999900 CT GUIDE RADIATION THERAPY NO CHARGE

## 2022-08-08 PROCEDURE — 77263 THER RADIOLOGY TX PLNG CPLX: CPT | Performed by: RADIOLOGY

## 2022-08-08 NOTE — CARE COORDINATION
Ambulatory Care Coordination Note  8/8/2022    ACC: Gloria Nurse, RN    Summary Note: Spoke with Ivonne Langston and family member Shahriar All. Ivonne Langston states she is not feeling the best  States nausea is improved but now having diarrhea  Has PCP appt scheduled for tomorrow  [facility] continues to be in place  Not able to complete Med Rec due to [facility] nurse setting up meds weekly  Had a lengthy discussion today about support services that can be put in place to provide support  Discussed Hospice which she declines  Discussed palliative care program which they are going to think about and talk about. States they will get back with me if they decide to pursue palliative care. Also discussed referral to [facility] which they will think about as well  I gave Sara Keith my contact information. No falls    Plan  Assist with setting up additional support as she allows  Ensure follow up with PCP and specialist as scheduled  Reinforce importance of early symptom recognition and reporting to prevent exacerbation and unnecessary hospitalization  Reinforce importance of fall prevention and intervention to reduce risk for injury related to falls  General Assessment    Do you have any symptoms that are causing concern?: Yes  Progression since Onset: Unchanged  Reported Symptoms: Other, Nausea, Vomiting, Pain (Comment: cancer)           Lab Results       None            Care Coordination Interventions    Referral from Primary Care Provider: No  Suggested Interventions and [address] [place] [address] Hwy: Completed  Medication Assistance Program: Not Started  Medi Set or Pill Pack: Completed (Comment: set up by [facility] nurse weekly)  Occupational Therapy: Not Started  Palliative Care: In Process  Physical Therapy: Not Started  Senior Services:  In Process (Comment: COA/also discussed referral to [address] [place] [address] on Aging)  Social Work: Not Started  Transportation Support: Completed          Goals Addressed                      This Visit's Progress Conditions and Symptoms (pt-stated)   On track      I will schedule office visits, as directed by my provider. I will keep my appointment or reschedule if I have to cancel. I will notify my provider of any barriers to my plan of care. I will notify my provider of any symptoms that indicate a worsening of my condition. Barriers: need for additional support and education  Plan for overcoming my barriers: need for additional support and education  Confidence: 9/10  Anticipated Goal Completion Date: 10/8/22          Reduce Falls  (pt-stated)   On track      I will reduce my risk of falls by the following: Use walking aids like cane or walker    Barriers: impairment:  cognitive  Plan for overcoming my barriers: family and , ACM support  Confidence: 7/10  Anticipated Goal Completion Date: 10/8/22              Prior to Admission medications    Medication Sig Start Date End Date Taking? Authorizing Provider   dexamethasone (DECADRON) 4 MG tablet Take 1 tablet by mouth in the morning and 1 tablet at noon and 1 tablet in the evening and 1 tablet before bedtime. Do all this for 20 days. 8/1/22 8/21/22  Rut Boateng MD   HYDROcodone-acetaminophen (NORCO) 5-325 MG per tablet Take 1 tablet by mouth every 6 hours as needed for Pain for up to 7 days. Intended supply: 3 days. Take lowest dose possible to manage pain 8/1/22 8/8/22  Rut Boateng MD   amiodarone (CORDARONE) 200 MG tablet Take 1 tablet by mouth in the morning.  7/19/22 8/18/22  Dayanara Perez MD   midodrine (PROAMATINE) 2.5 MG tablet Take 1 tablet by mouth 3 times daily 7/6/22   Farhana Arce MD   sertraline (ZOLOFT) 50 MG tablet take 1 tablet by mouth twice a day 6/28/22   Ivonne Strickland MD   Calcium Carbonate-Vitamin D (OYSTER SHELL CALCIUM/D) 500-200 MG-UNIT TABS take 1 tablet by mouth twice a day 6/28/22   Ivonne Strickland MD   RA BIOTIN 1000 MCG TABS Take 1,000 mcg by mouth 2 times daily 6/21/22   Dayanara Perez MD   vitamin D (ERGOCALCIFEROL) 1.25 MG (76431 UT) CAPS capsule take 1 capsule by mouth every week 6/21/22   Karthik Rinaldi MD   metoprolol tartrate (LOPRESSOR) 25 MG tablet Take 0.5 tablets by mouth 2 times daily 5/19/22   David Denson DO   levothyroxine (SYNTHROID) 100 MCG tablet take 1 tablet by mouth once daily 5/4/22   Sammy Mccormack MD   isosorbide mononitrate (IMDUR) 30 MG extended release tablet take 1 tablet by mouth once daily 4/1/22   Sammy Mccormack MD   simvastatin (ZOCOR) 20 MG tablet take 1 tablet by mouth every evening 3/28/22   Sammy Mccormack MD   docusate (COLACE, DULCOLAX) 100 MG CAPS Take 100 mg by mouth 2 times daily 3/22/22   Sammy Mccormack MD   Nebulizers (COMPRESSOR/NEBULIZER) MISC 1 Device by Does not apply route 4 times daily as needed (Shortness of breath and wheezing) 3/3/22   Sammy Mccormack MD   albuterol (PROVENTIL) (2.5 MG/3ML) 0.083% nebulizer solution Take 3 mLs by nebulization every 6 hours as needed for Wheezing 2/28/22   Delvin Sarmiento MD   polyethylene glycol [facility]) 17 GM/SCOOP powder Take 17 g by mouth daily as needed (constipation) 2/16/22 8/15/22  Sammy Mccormack MD   pantoprazole (PROTONIX) 40 MG tablet take 1 tablet by mouth once daily 2/10/22   Sammy Mccormack MD   zinc sulfate (ZINCATE) 220 (50 Zn) MG capsule Take 1 capsule by mouth daily 1/28/22   Delvin Sarmiento MD   albuterol sulfate  (90 Base) MCG/ACT inhaler Inhale 2 puffs into the lungs every 6 hours as needed for Wheezing or Shortness of Breath 1/25/22   Tamela Araya MD   levETIRAcetam (KEPPRA) 500 MG tablet take 1 tablet by mouth twice a day 11/16/21   Melita Agudelo MD   loratadine (CLARITIN) 10 MG tablet take 1 tablet by mouth once daily 11/16/21   Sammy Mccormack MD   REFRESH PLUS 0.5 % SOLN ophthalmic solution use as directed 9/9/21   Historical Provider, MD   ondansetron (ZOFRAN-ODT) 4 MG disintegrating tablet Take 4 mg by mouth every 8 hours as needed 8/5/21 Historical Provider, MD   traZODone (DESYREL) 50 MG tablet take 1 tablet by mouth at bedtime if needed for sleep  Patient not taking: No sig reported 8/16/21 8/1/22  Dinah Echols MD   melatonin 3 MG TABS tablet Take 1 tablet by mouth nightly as needed (insomnia) 7/20/21   Elliot Mina MD   Nutritional Supplements (ENSURE ORIGINAL) LIQD Take 1 Can by mouth 2 times daily 5/13/21   Dinah Echols MD   Multiple Vitamins-Minerals (CENTRUM SILVER ADULT 50+) TABS Take 1 tablet by mouth daily 12/7/15   Aram Buchanan MD       Future Appointments   Date Time Provider Irene Alyssa   8/8/2022  2:45 PM Adrienne Clark CT SIM STRZ [facility] Velarde [facility]   8/9/2022  1:50 PM Dinah Echols MD [facility]   8/18/2022  4:00 PM STR MRI RM1 STRZ MRI STR Radiolog   8/23/2022  1:30 PM PAMELA Alejandre - CNP N SRPXNEURSU [facility]   10/11/2022  1:30 PM Dinah Echols MD [facility] [address]

## 2022-08-08 NOTE — TELEPHONE ENCOUNTER
Latanya called requesting a refill of their:    sertraline (ZOLOFT) 50 MG tablet BID    [facility] on [address]

## 2022-08-08 NOTE — TELEPHONE ENCOUNTER
Patient does have an appt tomorrow but they are afraid that she will forget the refill.     Date of last visit:  7/5/2022  Date of next visit:  8/9/2022    Requested Prescriptions     Pending Prescriptions Disp Refills    sertraline (ZOLOFT) 50 MG tablet 180 tablet 1     Sig: take 1 tablet by mouth twice a day

## 2022-08-09 ENCOUNTER — OFFICE VISIT (OUTPATIENT)
Dept: FAMILY MEDICINE CLINIC | Age: 87
End: 2022-08-09

## 2022-08-09 VITALS
SYSTOLIC BLOOD PRESSURE: 110 MMHG | HEART RATE: 68 BPM | BODY MASS INDEX: 25.95 KG/M2 | RESPIRATION RATE: 12 BRPM | WEIGHT: 132.2 LBS | DIASTOLIC BLOOD PRESSURE: 70 MMHG | HEIGHT: 60 IN

## 2022-08-09 DIAGNOSIS — E03.4 HYPOTHYROIDISM DUE TO ACQUIRED ATROPHY OF THYROID: ICD-10-CM

## 2022-08-09 DIAGNOSIS — C50.919 CARCINOMA OF BREAST METASTATIC TO LIVER, UNSPECIFIED LATERALITY (HCC): ICD-10-CM

## 2022-08-09 DIAGNOSIS — C50.919 MALIGNANT NEOPLASM OF BREAST METASTATIC TO BRAIN, UNSPECIFIED LATERALITY (HCC): Primary | ICD-10-CM

## 2022-08-09 DIAGNOSIS — I10 ESSENTIAL HYPERTENSION: ICD-10-CM

## 2022-08-09 DIAGNOSIS — Z91.81 AT HIGH RISK FOR FALLS: ICD-10-CM

## 2022-08-09 DIAGNOSIS — Z09 HOSPITAL DISCHARGE FOLLOW-UP: ICD-10-CM

## 2022-08-09 DIAGNOSIS — C79.31 MALIGNANT NEOPLASM OF BREAST METASTATIC TO BRAIN, UNSPECIFIED LATERALITY (HCC): Primary | ICD-10-CM

## 2022-08-09 DIAGNOSIS — H53.9 VISUAL DISTURBANCE: ICD-10-CM

## 2022-08-09 DIAGNOSIS — C78.7 CARCINOMA OF BREAST METASTATIC TO LIVER, UNSPECIFIED LATERALITY (HCC): ICD-10-CM

## 2022-08-09 PROCEDURE — G8417 CALC BMI ABV UP PARAM F/U: HCPCS | Performed by: EMERGENCY MEDICINE

## 2022-08-09 PROCEDURE — G8427 DOCREV CUR MEDS BY ELIG CLIN: HCPCS | Performed by: EMERGENCY MEDICINE

## 2022-08-09 PROCEDURE — 99214 OFFICE O/P EST MOD 30 MIN: CPT | Performed by: EMERGENCY MEDICINE

## 2022-08-09 PROCEDURE — 1036F TOBACCO NON-USER: CPT | Performed by: EMERGENCY MEDICINE

## 2022-08-09 PROCEDURE — 1090F PRES/ABSN URINE INCON ASSESS: CPT | Performed by: EMERGENCY MEDICINE

## 2022-08-09 PROCEDURE — 1123F ACP DISCUSS/DSCN MKR DOCD: CPT | Performed by: EMERGENCY MEDICINE

## 2022-08-09 ASSESSMENT — ENCOUNTER SYMPTOMS
VOMITING: 0
TROUBLE SWALLOWING: 0
ABDOMINAL PAIN: 0
RHINORRHEA: 0
SORE THROAT: 0
DIARRHEA: 0
CONSTIPATION: 0
SINUS PRESSURE: 0
VOICE CHANGE: 0
NAUSEA: 0
SHORTNESS OF BREATH: 0
BACK PAIN: 0
WHEEZING: 0
COUGH: 0
CHEST TIGHTNESS: 0

## 2022-08-09 NOTE — PROGRESS NOTES
Visit Date: 8/9/2022    Subjective:    Jillian Mauro is a 80 y. o.female who presents today for:  Chief Complaint   Patient presents with    Follow-Up from Hospital         HPI:     HPI    Seen in the ER for vomiting but better now. No vomiting since was at the ER    Had pressure like-HA he is taking Decadron 3 times a day instead of 4 times a day    Want her Eilleen Fall \"Krista\" on the HIPPA with Brother Con Sullivan Medications:  Current Outpatient Medications   Medication Sig Dispense Refill    dexamethasone (DECADRON) 4 MG tablet Take 1 tablet by mouth in the morning and 1 tablet at noon and 1 tablet in the evening and 1 tablet before bedtime. Do all this for 20 days. 80 tablet 1    amiodarone (CORDARONE) 200 MG tablet Take 1 tablet by mouth in the morning.  30 tablet 0    midodrine (PROAMATINE) 2.5 MG tablet Take 1 tablet by mouth 3 times daily 90 tablet 3    sertraline (ZOLOFT) 50 MG tablet take 1 tablet by mouth twice a day 180 tablet 1    Calcium Carbonate-Vitamin D (OYSTER SHELL CALCIUM/D) 500-200 MG-UNIT TABS take 1 tablet by mouth twice a day 180 tablet 3    RA BIOTIN 1000 MCG TABS Take 1,000 mcg by mouth 2 times daily 60 tablet 5    vitamin D (ERGOCALCIFEROL) 1.25 MG (32260 UT) CAPS capsule take 1 capsule by mouth every week 5 capsule 5    metoprolol tartrate (LOPRESSOR) 25 MG tablet Take 0.5 tablets by mouth 2 times daily 60 tablet 3    levothyroxine (SYNTHROID) 100 MCG tablet take 1 tablet by mouth once daily 90 tablet 0    isosorbide mononitrate (IMDUR) 30 MG extended release tablet take 1 tablet by mouth once daily 90 tablet 1    simvastatin (ZOCOR) 20 MG tablet take 1 tablet by mouth every evening 90 tablet 1    docusate (COLACE, DULCOLAX) 100 MG CAPS Take 100 mg by mouth 2 times daily 60 capsule 1    Nebulizers (COMPRESSOR/NEBULIZER) MISC 1 Device by Does not apply route 4 times daily as needed (Shortness of breath and wheezing) 1 each 3    albuterol (PROVENTIL) (2.5 MG/3ML) 0.083% nebulizer solution Take 3 mLs by nebulization every 6 hours as needed for Wheezing 120 each 3    polyethylene glycol (GLYCOLAX) 17 GM/SCOOP powder Take 17 g by mouth daily as needed (constipation) 510 g 5    pantoprazole (PROTONIX) 40 MG tablet take 1 tablet by mouth once daily 90 tablet 1    zinc sulfate (ZINCATE) 220 (50 Zn) MG capsule Take 1 capsule by mouth daily 30 capsule 0    albuterol sulfate  (90 Base) MCG/ACT inhaler Inhale 2 puffs into the lungs every 6 hours as needed for Wheezing or Shortness of Breath 18 g 3    levETIRAcetam (KEPPRA) 500 MG tablet take 1 tablet by mouth twice a day 240 tablet 2    loratadine (CLARITIN) 10 MG tablet take 1 tablet by mouth once daily 90 tablet 1    REFRESH PLUS 0.5 % SOLN ophthalmic solution use as directed      ondansetron (ZOFRAN-ODT) 4 MG disintegrating tablet Take 4 mg by mouth every 8 hours as needed      melatonin 3 MG TABS tablet Take 1 tablet by mouth nightly as needed (insomnia) 30 tablet 3    Nutritional Supplements (ENSURE ORIGINAL) LIQD Take 1 Can by mouth 2 times daily 60 Bottle 5    Multiple Vitamins-Minerals (CENTRUM SILVER ADULT 50+) TABS Take 1 tablet by mouth daily 90 tablet 1     No current facility-administered medications for this visit. Subjective:      Review of Systems   Constitutional:  Negative for appetite change, chills, diaphoresis, fatigue and fever. HENT:  Negative for congestion, ear pain, postnasal drip, rhinorrhea, sinus pressure, sneezing, sore throat, trouble swallowing and voice change. Respiratory:  Negative for cough, chest tightness, shortness of breath and wheezing. Cardiovascular:  Negative for chest pain, palpitations and leg swelling. Gastrointestinal:  Negative for abdominal pain, constipation, diarrhea, nausea and vomiting. Musculoskeletal:  Negative for arthralgias, back pain, joint swelling, myalgias, neck pain and neck stiffness. Neurological:  Positive for headaches.  Negative for dizziness, syncope, weakness, light-headedness and numbness. Objective:     /70 (Site: Right Upper Arm, Position: Sitting, Cuff Size: Medium Adult)   Pulse 68   Resp 12   Ht 5' (1.524 m)   Wt 132 lb 3.2 oz (60 kg)   LMP  (LMP Unknown) Comment: age 52 ovaries intact  BMI 25.82 kg/m²   BP Readings from Last 3 Encounters:   08/09/22 110/70   08/04/22 (!) 170/63   08/01/22 (!) 160/69     Wt Readings from Last 3 Encounters:   08/09/22 132 lb 3.2 oz (60 kg)   08/04/22 135 lb (61.2 kg)   08/01/22 134 lb 8 oz (61 kg)       Physical Exam  Vitals reviewed. Constitutional:       Appearance: She is well-developed. HENT:      Head: Normocephalic and atraumatic. Right Ear: External ear normal.      Left Ear: External ear normal.      Nose: Nose normal.   Eyes:      General: No scleral icterus. Conjunctiva/sclera: Conjunctivae normal.      Pupils: Pupils are equal, round, and reactive to light. Neck:      Thyroid: No thyromegaly. Vascular: No JVD. Cardiovascular:      Rate and Rhythm: Normal rate and regular rhythm. Heart sounds: No murmur heard. No friction rub. Pulmonary:      Effort: Pulmonary effort is normal.      Breath sounds: Normal breath sounds. No wheezing or rales. Chest:      Chest wall: No tenderness. Abdominal:      General: Bowel sounds are normal.      Palpations: Abdomen is soft. There is no mass. Tenderness: There is no abdominal tenderness. Musculoskeletal:      Cervical back: Normal range of motion and neck supple. Lymphadenopathy:      Cervical: No cervical adenopathy. Skin:     Findings: No rash. Neurological:      Mental Status: She is alert and oriented to person, place, and time. Psychiatric:         Behavior: Behavior is cooperative. Assessment:         Diagnosis Orders   1. 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 Visual disturbance        4. Hypothyroidism due to acquired atrophy of thyroid        5. Essential hypertension            Plan:      Medications Prescribed:  No orders of the defined types were placed in this encounter. Orders Placed:  Orders Placed This Encounter   Procedures    CLAUDIA Langston DO, Hematology & Oncology, [place]     Referral Priority:   Routine     Referral Type:   Eval and Treat     Referral Reason:   Specialty Services Required     Referred to Provider:   Chris Love DO     Requested Specialty:   Hematology and Oncology     Number of Visits Requested:   1        Return in about 3 months (around 11/9/2022). Discussed use, benefit, and side effects of prescribedmedications. All patient questions answered. Pt voiced understanding. Instructedto continue current medications, diet and exercise. Patient agreed with treatmentplan. On the basis of positive falls risk screening, assessment and plan is as follows: home safety tips provided.

## 2022-08-09 NOTE — PROGRESS NOTES
Physician Progress Note      PATIENTYaritza Harris  CSN #:                  [id number]  :                       1933  ADMIT DATE:       2022 7:28 PM  [address] [place] DATE:        2022 3:45 PM  RESPONDING  PROVIDER #:        Jose Mcknight MD          QUERY TEXT:    Patient was diagnosed with brain metastasis. ? Imaging showed? New since the   prior study is approximately 5-6 mm of right-to-left midline shift at the   level of the septum pellucidum. ? Large right posterior temporal lobe lesion   w/extensive surrounding edema and mass effect upon the right lateral   ventricle. ? The patient was treated with IV and oral Decadron. After further   review, can the imaging finding be further specified as: The medical record reflects the following:  Risk Factors: brain mass  Clinical Indicators: Imaging showed? New since the prior study is approximately   5-6 mm of right-to-left midline shift at the level of the septum pellucidum. ? Treatment: treated with IV and oral Decadron. Thank you. Please call if you have any questions. P) [phone]. Signed   by Yvrose Bingham RN Clinical , CRCR  Options provided:  -- Cerebral edema  -- Brain compression  -- Cerebral edema and Brain compression  -- Other - I will add my own diagnosis  -- Disagree - Not applicable / Not valid  -- Disagree - Clinically unable to determine / Unknown  -- Refer to Clinical Documentation Reviewer    PROVIDER RESPONSE TEXT:    This patient has cerebral edema and brain compression.     Query created by: Beth Allen on 2022 7:17 AM      Electronically signed by:  Jose Mcknight MD 2022 5:55 PM

## 2022-08-11 DIAGNOSIS — C50.919 BREAST CANCER METASTASIZED TO BRAIN, UNSPECIFIED LATERALITY (HCC): ICD-10-CM

## 2022-08-11 DIAGNOSIS — C79.31 BREAST CANCER METASTASIZED TO BRAIN, UNSPECIFIED LATERALITY (HCC): ICD-10-CM

## 2022-08-11 RX ORDER — HYDROCODONE BITARTRATE AND ACETAMINOPHEN 5; 325 MG/1; MG/1
1 TABLET ORAL EVERY 6 HOURS PRN
Qty: 28 TABLET | Refills: 0 | OUTPATIENT
Start: 2022-08-11 | End: 2022-08-18

## 2022-08-11 NOTE — TELEPHONE ENCOUNTER
Latanya Rod called requesting a refill on the following medications:  Requested Prescriptions     Pending Prescriptions Disp Refills    HYDROcodone-acetaminophen (NORCO) 5-325 MG per tablet 28 tablet 0     Sig: Take 1 tablet by mouth every 6 hours as needed for Pain for up to 7 days. Intended supply: 3 days. Take lowest dose possible to manage pain     Pharmacy verified:  .pv  [id number] - [place], [address]     Date of last visit: 06/23/2022  Date of next visit (if applicable): 4/06/1213      Patient's bottle says it was 7.5-325mg rather than the 5/325 mg tablets?   Last refill was from [facility], pt said they were told to call this office for the refill

## 2022-08-12 ENCOUNTER — HOSPITAL ENCOUNTER (OUTPATIENT)
Dept: RADIATION ONCOLOGY | Age: 87
End: 2022-08-12
Payer: MEDICARE

## 2022-08-12 ENCOUNTER — TELEPHONE (OUTPATIENT)
Dept: FAMILY MEDICINE CLINIC | Age: 87
End: 2022-08-12

## 2022-08-12 DIAGNOSIS — C79.31 BREAST CANCER METASTASIZED TO BRAIN, UNSPECIFIED LATERALITY (HCC): ICD-10-CM

## 2022-08-12 DIAGNOSIS — C50.919 BREAST CANCER METASTASIZED TO BRAIN, UNSPECIFIED LATERALITY (HCC): ICD-10-CM

## 2022-08-12 PROCEDURE — 77334 RADIATION TREATMENT AID(S): CPT | Performed by: RADIOLOGY

## 2022-08-12 PROCEDURE — 77307 TELETHX ISODOSE PLAN CPLX: CPT | Performed by: RADIOLOGY

## 2022-08-12 RX ORDER — HYDROCODONE BITARTRATE AND ACETAMINOPHEN 5; 325 MG/1; MG/1
1 TABLET ORAL EVERY 6 HOURS PRN
Qty: 28 TABLET | Refills: 0 | Status: SHIPPED | OUTPATIENT
Start: 2022-08-12 | End: 2022-08-25 | Stop reason: SDUPTHER

## 2022-08-12 RX ORDER — ONDANSETRON 4 MG/1
TABLET, FILM COATED ORAL
Qty: 30 TABLET | Refills: 0 | Status: ON HOLD | OUTPATIENT
Start: 2022-08-12 | End: 2022-08-24 | Stop reason: HOSPADM

## 2022-08-15 ENCOUNTER — CARE COORDINATION (OUTPATIENT)
Dept: CARE COORDINATION | Age: 87
End: 2022-08-15

## 2022-08-15 DIAGNOSIS — C79.31 MALIGNANT NEOPLASM OF BREAST METASTATIC TO BRAIN, UNSPECIFIED LATERALITY (HCC): Primary | ICD-10-CM

## 2022-08-15 DIAGNOSIS — C50.919 MALIGNANT NEOPLASM OF BREAST METASTATIC TO BRAIN, UNSPECIFIED LATERALITY (HCC): Primary | ICD-10-CM

## 2022-08-15 DIAGNOSIS — C79.31 BRAIN METASTASES (HCC): ICD-10-CM

## 2022-08-15 NOTE — TELEPHONE ENCOUNTER
Date of last visit:  Visit date not found  Date of next visit:  11/10/2022    Requested Prescriptions     Pending Prescriptions Disp Refills    amiodarone (CORDARONE) 200 MG tablet [Pharmacy Med Name: AMIODARONE  MG TABLET] 30 tablet 0     Sig: take 1 tablet by mouth every morning

## 2022-08-15 NOTE — CARE COORDINATION
Dr. Adolfo Escalante,  could you please place an EXTERNAL order for Palliative Care for Latanya through [facility]. She does not want Hospice, but is willing to see what Palliative Care can offer her. Thank you.

## 2022-08-15 NOTE — CARE COORDINATION
Ambulatory Care Coordination Note  8/15/2022    ACC: Teena Lynch, RN    Summary Note: Spoke with chester Urias HIPPA form for continued Care coordination follow up  States her n/v have improved and she is able to eat and tolerate it well  States she continues to have headaches daily and is taking Norco which is effective  Following up with Dr. Oj Verdugo, oncology  Was agreeable to palliative care referral.  Will ask PCP for order and fax over needed information to [facility] palliative care  Was agreeable to referral placed with [employer] to see if they can provide additional support as well    Plan  Referral for palliative care  Referral for [employer]  Collaborate with oncology as needed  Reinforce importance of early symptom recognition and reporting to prevent exacerbation and unnecessary hospitalization  General Assessment    Do you have any symptoms that are causing concern?: Yes  Progression since Onset: Unchanged  Reported Symptoms: Other (Comment: headache)           Lab Results       None            Care Coordination Interventions    Referral from Primary Care Provider: No  Suggested Interventions and [address] [place] [address] Hwy: Completed  Medication Assistance Program: Not Started  Medi Set or Pill Pack: Completed (Comment: set up by [place] nurse weekly)  Occupational Therapy: Not Started  Palliative Care: In Process  Physical Therapy: Not Started  Senior Services: In Process (Comment: COA/also discussed referral to [address] [place] [address] on Aging)  Social Work: Not Started  Transportation Support: Completed          Goals Addressed    None         Prior to Admission medications    Medication Sig Start Date End Date Taking? Authorizing Provider   HYDROcodone-acetaminophen (NORCO) 5-325 MG per tablet Take 1 tablet by mouth every 6 hours as needed for Pain for up to 7 days.  8/12/22 8/19/22 Yes Valeri Mac MD   [facility]ron [facility]) 4 MG tablet 1 tablet every 6-8 hours for nausea vomiting 8/12/22 Yes Isiah Montes De Oca MD   sertraline (ZOLOFT) 50 MG tablet take 1 tablet by mouth twice a day 8/9/22  Yes Isiah Montes De Oca MD   dexamethasone (DECADRON) 4 MG tablet Take 1 tablet by mouth in the morning and 1 tablet at noon and 1 tablet in the evening and 1 tablet before bedtime. Do all this for 20 days. 8/1/22 8/21/22 Yes Ced Maradiaga MD   amiodarone (CORDARONE) 200 MG tablet Take 1 tablet by mouth in the morning.  7/19/22 8/18/22 Yes Rip Ortiz MD   midodrine (PROAMATINE) 2.5 MG tablet Take 1 tablet by mouth 3 times daily 7/6/22  Yes Erin Choi MD   Calcium Carbonate-Vitamin D (OYSTER SHELL CALCIUM/D) 500-200 MG-UNIT TABS take 1 tablet by mouth twice a day 6/28/22  Yes Isiah Montes De Oca MD   RA BIOTIN 1000 MCG TABS Take 1,000 mcg by mouth 2 times daily 6/21/22  Yes Rip Ortiz MD   vitamin D (ERGOCALCIFEROL) 1.25 MG (11152 UT) CAPS capsule take 1 capsule by mouth every week 6/21/22  Yes Rip Ortiz MD   metoprolol tartrate (LOPRESSOR) 25 MG tablet Take 0.5 tablets by mouth 2 times daily 5/19/22  Yes Luis Britton DO   levothyroxine (SYNTHROID) 100 MCG tablet take 1 tablet by mouth once daily 5/4/22  Yes Isiah Montes De Oca MD   isosorbide mononitrate (IMDUR) 30 MG extended release tablet take 1 tablet by mouth once daily 4/1/22  Yes Isiah Montes De Oca MD   simvastatin (ZOCOR) 20 MG tablet take 1 tablet by mouth every evening 3/28/22  Yes Isiah Montes De Oca MD   docusate (COLACE, DULCOLAX) 100 MG CAPS Take 100 mg by mouth 2 times daily 3/22/22  Yes Isiah Montes De Oca MD   Nebulizers (COMPRESSOR/NEBULIZER) MISC 1 Device by Does not apply route 4 times daily as needed (Shortness of breath and wheezing) 3/3/22  Yes Isiah Montes De Oca MD   albuterol (PROVENTIL) (2.5 MG/3ML) 0.083% nebulizer solution Take 3 mLs by nebulization every 6 hours as needed for Wheezing 2/28/22  Yes Shima Fraga MD   polyethylene glycol (GLYCOLAX) 17 GM/SCOOP powder Take 17 g by mouth daily as [facility]   8/23/2022  2:45 PM SCHEDULE, Minerva Done LINAC [address] HOD   8/24/2022  2:45 PM SCHEDULE, Minerva Done LINAC [address]   8/25/2022  2:45 PM SCHEDULE, Minerva Done LINAC [address] HOD   8/26/2022  2:45 PM SCHEDULE, Minerva Done LINAC [address]   8/29/2022  2:45 PM SCHEDULE, Minerva Done LINAC [address]   11/10/2022  3:10 PM Trang Chaney MD [facility]  MARKET

## 2022-08-16 ENCOUNTER — HOSPITAL ENCOUNTER (OUTPATIENT)
Dept: RADIATION ONCOLOGY | Age: 87
Discharge: HOME | End: 2022-08-16
Payer: MEDICARE

## 2022-08-16 PROCEDURE — 77280 THER RAD SIMULAJ FIELD SMPL: CPT | Performed by: RADIOLOGY

## 2022-08-16 PROCEDURE — 77412 RADIATION TX DELIVERY LVL 3: CPT | Performed by: RADIOLOGY

## 2022-08-16 RX ORDER — AMIODARONE HYDROCHLORIDE 200 MG/1
TABLET ORAL
Qty: 30 TABLET | Refills: 3 | Status: SHIPPED | OUTPATIENT
Start: 2022-08-16 | End: 2022-10-04 | Stop reason: SDUPTHER

## 2022-08-16 NOTE — CARE COORDINATION
Noted.  I have faxed over additional information to [facility] palliative care program including demo sheet and med list.  I spoke with staff from palliative care and informed them information was coming.

## 2022-08-17 ENCOUNTER — HOSPITAL ENCOUNTER (OUTPATIENT)
Dept: RADIATION ONCOLOGY | Age: 87
Discharge: HOME | End: 2022-08-17
Payer: MEDICARE

## 2022-08-17 PROCEDURE — 77412 RADIATION TX DELIVERY LVL 3: CPT | Performed by: RADIOLOGY

## 2022-08-17 PROCEDURE — 77387 GUIDANCE FOR RADJ TX DLVR: CPT | Performed by: RADIOLOGY

## 2022-08-17 PROCEDURE — G6002 STEREOSCOPIC X-RAY GUIDANCE: HCPCS | Performed by: RADIOLOGY

## 2022-08-18 ENCOUNTER — HOSPITAL ENCOUNTER (OUTPATIENT)
Dept: RADIATION ONCOLOGY | Age: 87
Discharge: HOME | End: 2022-08-18
Payer: MEDICARE

## 2022-08-18 ENCOUNTER — HOSPITAL ENCOUNTER (OUTPATIENT)
Dept: MRI IMAGING | Age: 87
Discharge: HOME | DRG: 923 | End: 2022-08-18
Payer: MEDICARE

## 2022-08-18 VITALS
DIASTOLIC BLOOD PRESSURE: 58 MMHG | HEART RATE: 67 BPM | WEIGHT: 130.07 LBS | OXYGEN SATURATION: 93 % | TEMPERATURE: 97.8 F | RESPIRATION RATE: 20 BRPM | SYSTOLIC BLOOD PRESSURE: 117 MMHG | BODY MASS INDEX: 25.4 KG/M2

## 2022-08-18 DIAGNOSIS — C79.31 BRAIN METASTASES (HCC): ICD-10-CM

## 2022-08-18 PROCEDURE — 77387 GUIDANCE FOR RADJ TX DLVR: CPT | Performed by: RADIOLOGY

## 2022-08-18 PROCEDURE — A9579 GAD-BASE MR CONTRAST NOS,1ML: HCPCS | Performed by: RADIOLOGY

## 2022-08-18 PROCEDURE — G1010 CDSM STANSON: HCPCS

## 2022-08-18 PROCEDURE — 77412 RADIATION TX DELIVERY LVL 3: CPT | Performed by: RADIOLOGY

## 2022-08-18 PROCEDURE — 6360000004 HC RX CONTRAST MEDICATION: Performed by: RADIOLOGY

## 2022-08-18 PROCEDURE — G6002 STEREOSCOPIC X-RAY GUIDANCE: HCPCS | Performed by: RADIOLOGY

## 2022-08-18 RX ADMIN — GADOTERIDOL 15 ML: 279.3 INJECTION, SOLUTION INTRAVENOUS at 17:07

## 2022-08-18 NOTE — PROGRESS NOTES
[facility] [address], [address]        [place] JETER[place]AD [place],  [facility]: [phone]        F: [phone]       [employer]            Dr. Vincent Nguyen MD MS          Dr. Jacob Ro MD PhD    ON TREATMENT VISIT (OTV) NOTE     Date of Service: 2022  Patient ID: Sintia Courser   : 1933  MRN: [id number]   Acct Number: [de-identified]     RADIATION ONCOLOGY ATTENDING:  Sue Louis MD MS    DIAGNOSIS:  Cancer Staging  Metastatic breast cancer [facility])  Staging form: Breast, AJCC 8th Edition  - Clinical stage from 5/10/2018: Stage IV (rcT1c, cN0, cM1, G3, ER+, NH+, HER2+) - Signed by Miranda Simpson MD on 2021      Treatment Area: Brain    Current Total Dose(cGy): 900  Current Fraction: 3/10  Final/Cumulative Rx. Dose (cGy): 3000    Patient was seen today for weekly visit.      Wt Readings from Last 3 Encounters:   22 130 lb 1.1 oz (59 kg)   22 132 lb 3.2 oz (60 kg)   22 135 lb (61.2 kg)       BP (!) 117/58   Pulse 67   Temp 97.8 °F (36.6 °C) (Infrared)   Resp 20   Wt 130 lb 1.1 oz (59 kg)   LMP  (LMP Unknown) Comment: age 52 ovaries intact  SpO2 93%   BMI 25.40 kg/m²     Lab Results   Component Value Date    WBC 12.6 (H) 2022     2022       Comfort Alteration  Fatigue:None, able to perform daily activities    Pain Location: none  Pain Intensity (Current): 0 No Pain  Pain Treatment: No treatment scheduled  Pain Relief: No relief    Emotional Alteration:   Coping: effective    Nutritional Alteration  Anorexia: none   Nausea: No nausea noted  Vomiting: No vomiting   Salivary Glands- Acute: No changes over baseline  Taste Disturbance (Dysgeusia): Normal   Dyspepsia/Heartburn: None  Dysphagia/Esophagitis: None    Skin Alteration   Skin reaction: No changes noted  Alopecia: No loss    Mucous Membrane Alteration  Mucositis XRT Related: None  Pharynx and Esophagus- Acute: No change over baseline  Voice Changes: Normal    Ventilation Alteration  Cough: None  Hemoptysis: None  Dyspnea: Normal  Mucous Quantity/Quality:     CNS Alteration  Level of Consciousness: Alert, responds briskly, appropriately with minimal stimulus  Seizure Activity: None  Insomnia: Normal   Orientation: Oriented in 3 spheres of person, place, and time  Comment:   Speech Impairment: No  Ataxia: Normal    Additional Comments:    MEDICATIONS:     Current Outpatient Medications   Medication Sig Dispense Refill    amiodarone (CORDARONE) 200 MG tablet take 1 tablet by mouth every morning 30 tablet 3    HYDROcodone-acetaminophen (NORCO) 5-325 MG per tablet Take 1 tablet by mouth every 6 hours as needed for Pain for up to 7 days. 28 tablet 0    ondansetron (ZOFRAN) 4 MG tablet 1 tablet every 6-8 hours for nausea vomiting 30 tablet 0    sertraline (ZOLOFT) 50 MG tablet take 1 tablet by mouth twice a day 180 tablet 1    dexamethasone (DECADRON) 4 MG tablet Take 1 tablet by mouth in the morning and 1 tablet at noon and 1 tablet in the evening and 1 tablet before bedtime. Do all this for 20 days.  80 tablet 1    midodrine (PROAMATINE) 2.5 MG tablet Take 1 tablet by mouth 3 times daily 90 tablet 3    Calcium Carbonate-Vitamin D (OYSTER SHELL CALCIUM/D) 500-200 MG-UNIT TABS take 1 tablet by mouth twice a day 180 tablet 3    RA BIOTIN 1000 MCG TABS Take 1,000 mcg by mouth 2 times daily 60 tablet 5    vitamin D (ERGOCALCIFEROL) 1.25 MG (88442 UT) CAPS capsule take 1 capsule by mouth every week 5 capsule 5    metoprolol tartrate (LOPRESSOR) 25 MG tablet Take 0.5 tablets by mouth 2 times daily 60 tablet 3    levothyroxine (SYNTHROID) 100 MCG tablet take 1 tablet by mouth once daily 90 tablet 0    isosorbide mononitrate (IMDUR) 30 MG extended release tablet take 1 tablet by mouth once daily 90 tablet 1    simvastatin (ZOCOR) 20 MG tablet take 1 tablet by mouth every evening 90 tablet 1    docusate (COLACE, DULCOLAX) 100 MG CAPS Take 100 mg by mouth 2 times daily 60 capsule 1    Nebulizers (COMPRESSOR/NEBULIZER) MISC 1 Device by Does not apply route 4 times daily as needed (Shortness of breath and wheezing) 1 each 3    albuterol (PROVENTIL) (2.5 MG/3ML) 0.083% nebulizer solution Take 3 mLs by nebulization every 6 hours as needed for Wheezing 120 each 3    pantoprazole (PROTONIX) 40 MG tablet take 1 tablet by mouth once daily 90 tablet 1    zinc sulfate (ZINCATE) 220 (50 Zn) MG capsule Take 1 capsule by mouth daily 30 capsule 0    albuterol sulfate  (90 Base) MCG/ACT inhaler Inhale 2 puffs into the lungs every 6 hours as needed for Wheezing or Shortness of Breath 18 g 3    levETIRAcetam (KEPPRA) 500 MG tablet take 1 tablet by mouth twice a day 240 tablet 2    loratadine (CLARITIN) 10 MG tablet take 1 tablet by mouth once daily 90 tablet 1    REFRESH PLUS 0.5 % SOLN ophthalmic solution use as directed      ondansetron (ZOFRAN-ODT) 4 MG disintegrating tablet Take 4 mg by mouth every 8 hours as needed      melatonin 3 MG TABS tablet Take 1 tablet by mouth nightly as needed (insomnia) 30 tablet 3    Nutritional Supplements (ENSURE ORIGINAL) LIQD Take 1 Can by mouth 2 times daily 60 Bottle 5    Multiple Vitamins-Minerals (CENTRUM SILVER ADULT 50+) TABS Take 1 tablet by mouth daily 90 tablet 1     No current facility-administered medications for this encounter. * New    PHYSICAL EXAM:       ECO - Asymptomatic (Fully active, able to carry on all pre-disease activities without restriction)    Vital signs stable  General: NAD, AO x 3, Mentation is clear with appropriate affect. Examination stable    Chemotherapy Update: None    Treatment Imaging: Kv Pair    ASSESSMENT: No significant radiation side effects. Responding appropriately to symptomatic management. New medications, diagnostic results: Not applicable    PLAN: Again reviewed potential side effects of radiation for the patient's treatment. Continue local/topical care. Continue current radiation course as prescribed.

## 2022-08-19 ENCOUNTER — HOSPITAL ENCOUNTER (OUTPATIENT)
Dept: RADIATION ONCOLOGY | Age: 87
Discharge: HOME | End: 2022-08-19
Payer: MEDICARE

## 2022-08-19 PROCEDURE — G6002 STEREOSCOPIC X-RAY GUIDANCE: HCPCS | Performed by: RADIOLOGY

## 2022-08-19 PROCEDURE — 77387 GUIDANCE FOR RADJ TX DLVR: CPT | Performed by: RADIOLOGY

## 2022-08-19 PROCEDURE — 77412 RADIATION TX DELIVERY LVL 3: CPT | Performed by: RADIOLOGY

## 2022-08-19 NOTE — TELEPHONE ENCOUNTER
Pt's home health nurse called nika a #90 day refill for Metoprolol.     [address] [place] [address]

## 2022-08-19 NOTE — TELEPHONE ENCOUNTER
Date of last visit:  8/9/2022  Date of next visit:  11/10/2022    Requested Prescriptions     Pending Prescriptions Disp Refills    metoprolol tartrate (LOPRESSOR) 25 MG tablet 60 tablet 3     Sig: Take 0.5 tablets by mouth 2 times daily

## 2022-08-20 ENCOUNTER — APPOINTMENT (OUTPATIENT)
Dept: GENERAL RADIOLOGY | Age: 87
DRG: 923 | End: 2022-08-20
Payer: MEDICARE

## 2022-08-20 ENCOUNTER — HOSPITAL ENCOUNTER (INPATIENT)
Age: 87
LOS: 4 days | Discharge: HOME HEALTH | DRG: 923 | End: 2022-08-25
Attending: EMERGENCY MEDICINE | Admitting: INTERNAL MEDICINE
Payer: MEDICARE

## 2022-08-20 DIAGNOSIS — R53.1 GENERAL WEAKNESS: Primary | ICD-10-CM

## 2022-08-20 DIAGNOSIS — T74.01XA: ICD-10-CM

## 2022-08-20 DIAGNOSIS — L89.301 PRESSURE INJURY OF BUTTOCK, STAGE 1, UNSPECIFIED LATERALITY: ICD-10-CM

## 2022-08-20 LAB
ALBUMIN SERPL-MCNC: 3.4 G/DL (ref 3.5–5.1)
ALP BLD-CCNC: 45 U/L (ref 38–126)
ALT SERPL-CCNC: 29 U/L (ref 11–66)
ANION GAP SERPL CALCULATED.3IONS-SCNC: 8 MEQ/L (ref 8–16)
AST SERPL-CCNC: 19 U/L (ref 5–40)
BACTERIA: ABNORMAL /HPF
BASOPHILS # BLD: 0.1 %
BASOPHILS ABSOLUTE: 0 THOU/MM3 (ref 0–0.1)
BILIRUB SERPL-MCNC: 0.8 MG/DL (ref 0.3–1.2)
BILIRUBIN URINE: NEGATIVE
BLOOD, URINE: ABNORMAL
BUN BLDV-MCNC: 33 MG/DL (ref 7–22)
CALCIUM SERPL-MCNC: 8.6 MG/DL (ref 8.5–10.5)
CASTS 2: ABNORMAL /LPF
CASTS UA: ABNORMAL /LPF
CHARACTER, URINE: CLEAR
CHLORIDE BLD-SCNC: 104 MEQ/L (ref 98–111)
CO2: 30 MEQ/L (ref 23–33)
COLOR: YELLOW
CREAT SERPL-MCNC: 0.5 MG/DL (ref 0.4–1.2)
CRYSTALS, UA: ABNORMAL
EOSINOPHIL # BLD: 0.3 %
EOSINOPHILS ABSOLUTE: 0 THOU/MM3 (ref 0–0.4)
EPITHELIAL CELLS, UA: ABNORMAL /HPF
ERYTHROCYTE [DISTWIDTH] IN BLOOD BY AUTOMATED COUNT: 16.3 % (ref 11.5–14.5)
ERYTHROCYTE [DISTWIDTH] IN BLOOD BY AUTOMATED COUNT: 55.2 FL (ref 35–45)
FLU A ANTIGEN: NEGATIVE
FLU B ANTIGEN: NEGATIVE
GFR SERPL CREATININE-BSD FRML MDRD: > 90 ML/MIN/1.73M2
GLUCOSE BLD-MCNC: 74 MG/DL (ref 70–108)
GLUCOSE URINE: NEGATIVE MG/DL
HCT VFR BLD CALC: 43.4 % (ref 37–47)
HEMOGLOBIN: 13.4 GM/DL (ref 12–16)
IMMATURE GRANS (ABS): 0.16 THOU/MM3 (ref 0–0.07)
IMMATURE GRANULOCYTES: 1.3 %
KETONES, URINE: NEGATIVE
LEUKOCYTE ESTERASE, URINE: NEGATIVE
LYMPHOCYTES # BLD: 6.7 %
LYMPHOCYTES ABSOLUTE: 0.8 THOU/MM3 (ref 1–4.8)
MCH RBC QN AUTO: 28.6 PG (ref 26–33)
MCHC RBC AUTO-ENTMCNC: 30.9 GM/DL (ref 32.2–35.5)
MCV RBC AUTO: 92.7 FL (ref 81–99)
MISCELLANEOUS 2: ABNORMAL
MONOCYTES # BLD: 6.2 %
MONOCYTES ABSOLUTE: 0.8 THOU/MM3 (ref 0.4–1.3)
NITRITE, URINE: NEGATIVE
NUCLEATED RED BLOOD CELLS: 0 /100 WBC
OSMOLALITY CALCULATION: 289 MOSMOL/KG (ref 275–300)
PH UA: 5.5 (ref 5–9)
PLATELET # BLD: 72 THOU/MM3 (ref 130–400)
PLATELET ESTIMATE: ABNORMAL
PMV BLD AUTO: 12.3 FL (ref 9.4–12.4)
POTASSIUM REFLEX MAGNESIUM: 3.8 MEQ/L (ref 3.5–5.2)
PRO-BNP: 2966 PG/ML (ref 0–1800)
PROTEIN UA: NEGATIVE
RBC # BLD: 4.68 MILL/MM3 (ref 4.2–5.4)
RBC URINE: ABNORMAL /HPF
RENAL EPITHELIAL, UA: ABNORMAL
SARS-COV-2, NAAT: NOT  DETECTED
SCAN OF BLOOD SMEAR: NORMAL
SEG NEUTROPHILS: 85.4 %
SEGMENTED NEUTROPHILS ABSOLUTE COUNT: 10.7 THOU/MM3 (ref 1.8–7.7)
SODIUM BLD-SCNC: 142 MEQ/L (ref 135–145)
SPECIFIC GRAVITY, URINE: 1.02 (ref 1–1.03)
T4 FREE: 1.29 NG/DL (ref 0.93–1.76)
TOTAL CK: 33 U/L (ref 30–135)
TOTAL PROTEIN: 5.5 G/DL (ref 6.1–8)
TROPONIN T: < 0.01 NG/ML
TSH SERPL DL<=0.05 MIU/L-ACNC: 0.3 UIU/ML (ref 0.4–4.2)
UROBILINOGEN, URINE: 0.2 EU/DL (ref 0–1)
WBC # BLD: 12.5 THOU/MM3 (ref 4.8–10.8)
WBC UA: ABNORMAL /HPF
YEAST: ABNORMAL

## 2022-08-20 PROCEDURE — 84443 ASSAY THYROID STIM HORMONE: CPT

## 2022-08-20 PROCEDURE — 36415 COLL VENOUS BLD VENIPUNCTURE: CPT

## 2022-08-20 PROCEDURE — 80053 COMPREHEN METABOLIC PANEL: CPT

## 2022-08-20 PROCEDURE — 81003 URINALYSIS AUTO W/O SCOPE: CPT

## 2022-08-20 PROCEDURE — 87804 INFLUENZA ASSAY W/OPTIC: CPT

## 2022-08-20 PROCEDURE — 84439 ASSAY OF FREE THYROXINE: CPT

## 2022-08-20 PROCEDURE — 87635 SARS-COV-2 COVID-19 AMP PRB: CPT

## 2022-08-20 PROCEDURE — 81001 URINALYSIS AUTO W/SCOPE: CPT

## 2022-08-20 PROCEDURE — 85025 COMPLETE CBC W/AUTO DIFF WBC: CPT

## 2022-08-20 PROCEDURE — 99285 EMERGENCY DEPT VISIT HI MDM: CPT

## 2022-08-20 PROCEDURE — 84484 ASSAY OF TROPONIN QUANT: CPT

## 2022-08-20 PROCEDURE — 93005 ELECTROCARDIOGRAM TRACING: CPT | Performed by: STUDENT IN AN ORGANIZED HEALTH CARE EDUCATION/TRAINING PROGRAM

## 2022-08-20 PROCEDURE — 83880 ASSAY OF NATRIURETIC PEPTIDE: CPT

## 2022-08-20 PROCEDURE — 71045 X-RAY EXAM CHEST 1 VIEW: CPT

## 2022-08-20 PROCEDURE — 82550 ASSAY OF CK (CPK): CPT

## 2022-08-20 ASSESSMENT — ENCOUNTER SYMPTOMS
CHEST TIGHTNESS: 0
DIARRHEA: 0
ABDOMINAL PAIN: 0
EYE ITCHING: 0
ABDOMINAL DISTENTION: 0
COLOR CHANGE: 0
EYE DISCHARGE: 0
RHINORRHEA: 0
SINUS PRESSURE: 0
SHORTNESS OF BREATH: 0
EYE REDNESS: 0
NAUSEA: 0
SINUS PAIN: 0
VOMITING: 0

## 2022-08-20 ASSESSMENT — PAIN - FUNCTIONAL ASSESSMENT: PAIN_FUNCTIONAL_ASSESSMENT: NONE - DENIES PAIN

## 2022-08-20 NOTE — ED NOTES
Pt sitting at bedside at this time eating dinner. Patient denies needs at this time.       Marybel Gaytan RN  08/20/22 [id number]

## 2022-08-20 NOTE — ED TRIAGE NOTES
Patient to ED from home by ALEXANDRO with complaints of fatigue. Patient states that she had radiation yesterday at the cancer center. Patient states that she normally has fatigue after her treatment and her family member normally has a hot meal ready for her when she is done but yesterday she did not. Patient states she is fatigued and feels hungry.

## 2022-08-20 NOTE — ED NOTES
Patient returns from bathroom. Patient side rails up x2 and call light in reach. Report given to Brandie [facility].       Pierce Councilman, RN  08/20/22 1921
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
## 2022-08-21 PROBLEM — D69.6 THROMBOCYTOPENIA (HCC): Status: ACTIVE | Noted: 2022-01-01

## 2022-08-21 PROBLEM — T74.01XA ADULT NEGLECT: Status: ACTIVE | Noted: 2022-01-01

## 2022-08-21 PROBLEM — T74.01XA: Status: ACTIVE | Noted: 2022-01-01

## 2022-08-21 PROBLEM — R79.89 AZOTEMIA: Status: ACTIVE | Noted: 2022-01-01

## 2022-08-21 LAB
EKG ATRIAL RATE: 61 BPM
EKG P AXIS: 80 DEGREES
EKG P-R INTERVAL: 136 MS
EKG Q-T INTERVAL: 422 MS
EKG QRS DURATION: 76 MS
EKG QTC CALCULATION (BAZETT): 424 MS
EKG R AXIS: -3 DEGREES
EKG T AXIS: 137 DEGREES
EKG VENTRICULAR RATE: 61 BPM
FOLATE: > 20 NG/ML (ref 4.8–24.2)
INR BLD: 0.95 (ref 0.85–1.13)
VITAMIN B-12: 361 PG/ML (ref 211–911)

## 2022-08-21 PROCEDURE — 6370000000 HC RX 637 (ALT 250 FOR IP): Performed by: INTERNAL MEDICINE

## 2022-08-21 PROCEDURE — 82746 ASSAY OF FOLIC ACID SERUM: CPT

## 2022-08-21 PROCEDURE — 82607 VITAMIN B-12: CPT

## 2022-08-21 PROCEDURE — 85610 PROTHROMBIN TIME: CPT

## 2022-08-21 PROCEDURE — 93010 ELECTROCARDIOGRAM REPORT: CPT | Performed by: INTERNAL MEDICINE

## 2022-08-21 PROCEDURE — 86022 PLATELET ANTIBODIES: CPT

## 2022-08-21 PROCEDURE — 83883 ASSAY NEPHELOMETRY NOT SPEC: CPT

## 2022-08-21 PROCEDURE — 36415 COLL VENOUS BLD VENIPUNCTURE: CPT

## 2022-08-21 PROCEDURE — 1200000000 HC SEMI PRIVATE

## 2022-08-21 PROCEDURE — 2580000003 HC RX 258: Performed by: INTERNAL MEDICINE

## 2022-08-21 RX ORDER — POLYETHYLENE GLYCOL 3350 17 G/17G
17 POWDER, FOR SOLUTION ORAL DAILY PRN
Status: DISCONTINUED | OUTPATIENT
Start: 2022-08-21 | End: 2022-08-25 | Stop reason: HOSPADM

## 2022-08-21 RX ORDER — ISOSORBIDE MONONITRATE 30 MG/1
30 TABLET, EXTENDED RELEASE ORAL DAILY
Status: DISCONTINUED | OUTPATIENT
Start: 2022-08-21 | End: 2022-08-25 | Stop reason: HOSPADM

## 2022-08-21 RX ORDER — ASCORBIC ACID 500 MG
1000 TABLET ORAL DAILY
Status: ON HOLD | COMMUNITY
End: 2022-09-08 | Stop reason: HOSPADM

## 2022-08-21 RX ORDER — ERGOCALCIFEROL 1.25 MG/1
50000 CAPSULE ORAL WEEKLY
Status: DISCONTINUED | OUTPATIENT
Start: 2022-08-21 | End: 2022-08-25 | Stop reason: HOSPADM

## 2022-08-21 RX ORDER — ACETAMINOPHEN 325 MG/1
650 TABLET ORAL EVERY 6 HOURS PRN
Status: DISCONTINUED | OUTPATIENT
Start: 2022-08-21 | End: 2022-08-25 | Stop reason: HOSPADM

## 2022-08-21 RX ORDER — SODIUM CHLORIDE 0.9 % (FLUSH) 0.9 %
5-40 SYRINGE (ML) INJECTION PRN
Status: DISCONTINUED | OUTPATIENT
Start: 2022-08-21 | End: 2022-08-25 | Stop reason: HOSPADM

## 2022-08-21 RX ORDER — MULTIVITAMIN WITH IRON
1 TABLET ORAL DAILY
Status: DISCONTINUED | OUTPATIENT
Start: 2022-08-21 | End: 2022-08-25 | Stop reason: HOSPADM

## 2022-08-21 RX ORDER — LEVOTHYROXINE SODIUM 0.1 MG/1
100 TABLET ORAL DAILY
Status: DISCONTINUED | OUTPATIENT
Start: 2022-08-21 | End: 2022-08-25 | Stop reason: HOSPADM

## 2022-08-21 RX ORDER — ONDANSETRON 4 MG/1
4 TABLET, ORALLY DISINTEGRATING ORAL EVERY 8 HOURS PRN
Status: DISCONTINUED | OUTPATIENT
Start: 2022-08-21 | End: 2022-08-25 | Stop reason: HOSPADM

## 2022-08-21 RX ORDER — DOCUSATE SODIUM 100 MG/1
100 CAPSULE, LIQUID FILLED ORAL 2 TIMES DAILY
Status: DISCONTINUED | OUTPATIENT
Start: 2022-08-21 | End: 2022-08-25 | Stop reason: HOSPADM

## 2022-08-21 RX ORDER — MIDODRINE HYDROCHLORIDE 2.5 MG/1
2.5 TABLET ORAL 3 TIMES DAILY
Status: DISCONTINUED | OUTPATIENT
Start: 2022-08-21 | End: 2022-08-25 | Stop reason: HOSPADM

## 2022-08-21 RX ORDER — SODIUM CHLORIDE 0.9 % (FLUSH) 0.9 %
5-40 SYRINGE (ML) INJECTION EVERY 12 HOURS SCHEDULED
Status: DISCONTINUED | OUTPATIENT
Start: 2022-08-21 | End: 2022-08-25 | Stop reason: HOSPADM

## 2022-08-21 RX ORDER — AMIODARONE HYDROCHLORIDE 200 MG/1
200 TABLET ORAL DAILY
Status: DISCONTINUED | OUTPATIENT
Start: 2022-08-21 | End: 2022-08-25 | Stop reason: HOSPADM

## 2022-08-21 RX ORDER — SODIUM CHLORIDE 9 MG/ML
INJECTION, SOLUTION INTRAVENOUS PRN
Status: DISCONTINUED | OUTPATIENT
Start: 2022-08-21 | End: 2022-08-25 | Stop reason: HOSPADM

## 2022-08-21 RX ORDER — SODIUM CHLORIDE 9 MG/ML
INJECTION, SOLUTION INTRAVENOUS CONTINUOUS
Status: DISCONTINUED | OUTPATIENT
Start: 2022-08-21 | End: 2022-08-25 | Stop reason: HOSPADM

## 2022-08-21 RX ORDER — LANOLIN ALCOHOL/MO/W.PET/CERES
3 CREAM (GRAM) TOPICAL NIGHTLY PRN
Status: DISCONTINUED | OUTPATIENT
Start: 2022-08-21 | End: 2022-08-25 | Stop reason: HOSPADM

## 2022-08-21 RX ORDER — ALPRAZOLAM 0.25 MG/1
0.25 TABLET ORAL 2 TIMES DAILY PRN
COMMUNITY
End: 2022-08-27

## 2022-08-21 RX ORDER — LEVETIRACETAM 500 MG/1
500 TABLET ORAL 2 TIMES DAILY
Status: DISCONTINUED | OUTPATIENT
Start: 2022-08-21 | End: 2022-08-25 | Stop reason: HOSPADM

## 2022-08-21 RX ORDER — DEXAMETHASONE 4 MG/1
4 TABLET ORAL EVERY 6 HOURS
Status: DISCONTINUED | OUTPATIENT
Start: 2022-08-21 | End: 2022-08-21

## 2022-08-21 RX ORDER — ACETAMINOPHEN 650 MG/1
650 SUPPOSITORY RECTAL EVERY 6 HOURS PRN
Status: DISCONTINUED | OUTPATIENT
Start: 2022-08-21 | End: 2022-08-25 | Stop reason: HOSPADM

## 2022-08-21 RX ORDER — PREDNISONE 1 MG/1
1 TABLET ORAL DAILY
COMMUNITY

## 2022-08-21 RX ORDER — ATORVASTATIN CALCIUM 10 MG/1
10 TABLET, FILM COATED ORAL DAILY
Status: DISCONTINUED | OUTPATIENT
Start: 2022-08-21 | End: 2022-08-21

## 2022-08-21 RX ORDER — PANTOPRAZOLE SODIUM 40 MG/1
40 TABLET, DELAYED RELEASE ORAL
Status: DISCONTINUED | OUTPATIENT
Start: 2022-08-21 | End: 2022-08-25 | Stop reason: HOSPADM

## 2022-08-21 RX ORDER — CETIRIZINE HYDROCHLORIDE 10 MG/1
5 TABLET ORAL DAILY
Status: DISCONTINUED | OUTPATIENT
Start: 2022-08-21 | End: 2022-08-25 | Stop reason: HOSPADM

## 2022-08-21 RX ORDER — ONDANSETRON 2 MG/ML
4 INJECTION INTRAMUSCULAR; INTRAVENOUS EVERY 6 HOURS PRN
Status: DISCONTINUED | OUTPATIENT
Start: 2022-08-21 | End: 2022-08-25 | Stop reason: HOSPADM

## 2022-08-21 RX ORDER — PREDNISONE 1 MG/1
1 TABLET ORAL DAILY
Status: DISCONTINUED | OUTPATIENT
Start: 2022-08-21 | End: 2022-08-25 | Stop reason: HOSPADM

## 2022-08-21 RX ORDER — BIOTIN 1 MG
1000 TABLET ORAL 2 TIMES DAILY
Status: DISCONTINUED | OUTPATIENT
Start: 2022-08-21 | End: 2022-08-21 | Stop reason: RX

## 2022-08-21 RX ORDER — ALBUTEROL SULFATE 2.5 MG/3ML
2.5 SOLUTION RESPIRATORY (INHALATION) EVERY 6 HOURS PRN
Status: DISCONTINUED | OUTPATIENT
Start: 2022-08-21 | End: 2022-08-25 | Stop reason: HOSPADM

## 2022-08-21 RX ADMIN — LEVETIRACETAM 500 MG: 500 TABLET, FILM COATED ORAL at 08:04

## 2022-08-21 RX ADMIN — Medication 1 TABLET: at 08:05

## 2022-08-21 RX ADMIN — SODIUM CHLORIDE: 9 INJECTION, SOLUTION INTRAVENOUS at 06:18

## 2022-08-21 RX ADMIN — METOPROLOL TARTRATE 12.5 MG: 25 TABLET, FILM COATED ORAL at 08:04

## 2022-08-21 RX ADMIN — PANTOPRAZOLE SODIUM 40 MG: 40 TABLET, DELAYED RELEASE ORAL at 06:32

## 2022-08-21 RX ADMIN — DOCUSATE SODIUM 100 MG: 100 CAPSULE, LIQUID FILLED ORAL at 08:08

## 2022-08-21 RX ADMIN — MIDODRINE HYDROCHLORIDE 2.5 MG: 2.5 TABLET ORAL at 12:59

## 2022-08-21 RX ADMIN — CETIRIZINE HYDROCHLORIDE 5 MG: 10 TABLET, FILM COATED ORAL at 08:04

## 2022-08-21 RX ADMIN — MIDODRINE HYDROCHLORIDE 2.5 MG: 2.5 TABLET ORAL at 06:32

## 2022-08-21 RX ADMIN — SERTRALINE 50 MG: 50 TABLET, FILM COATED ORAL at 08:04

## 2022-08-21 RX ADMIN — ISOSORBIDE MONONITRATE 30 MG: 30 TABLET, EXTENDED RELEASE ORAL at 08:04

## 2022-08-21 RX ADMIN — LEVOTHYROXINE SODIUM 100 MCG: 0.1 TABLET ORAL at 06:33

## 2022-08-21 RX ADMIN — AMIODARONE HYDROCHLORIDE 200 MG: 200 TABLET ORAL at 08:05

## 2022-08-21 RX ADMIN — PREDNISONE 1 MG: 1 TABLET ORAL at 16:35

## 2022-08-21 RX ADMIN — LEVETIRACETAM 500 MG: 500 TABLET, FILM COATED ORAL at 19:56

## 2022-08-21 RX ADMIN — MIDODRINE HYDROCHLORIDE 2.5 MG: 2.5 TABLET ORAL at 16:35

## 2022-08-21 ASSESSMENT — PAIN SCALES - GENERAL
PAINLEVEL_OUTOF10: 0
PAINLEVEL_OUTOF10: 0

## 2022-08-21 NOTE — PLAN OF CARE
Problem: Skin/Tissue Integrity  Goal: Absence of new skin breakdown  Description: 1. Monitor for areas of redness and/or skin breakdown  2. Assess vascular access sites hourly  3. Every 4-6 hours minimum:  Change oxygen saturation probe site  4. Every 4-6 hours:  If on nasal continuous positive airway pressure, respiratory therapy assess nares and determine need for appliance change or resting period. Outcome: Progressing  Note: Skin assessment completed. Patient turned every 2 hours and as needed. No skin breakdown this shift. Problem: ABCDS Injury Assessment  Goal: Absence of physical injury  Outcome: Progressing  Flowsheets (Taken 8/21/2022 1611)  Absence of Physical Injury: Implement safety measures based on patient assessment     Problem: Safety - Adult  Goal: Free from fall injury  Outcome: Progressing  Flowsheets  Taken 9/88/2464 0377 by Christiano Dill RN  Free From Fall Injury:   Instruct family/caregiver on patient safety   Based on caregiver fall risk screen, instruct family/caregiver to ask for assistance with transferring infant if caregiver noted to have fall risk factors  Free From Fall Injury: Instruct family/caregiver on patient safety   Pain Assessment: None - Denies Pain  Pain Level: 0     Pt denies pain, will continue to monitor and reassess. Is pain goal met at this time? Yes     Discharge date and location are unclear at this time. Care plan reviewed with patient. Patient verbalizes understanding of the plan of care and contributes to goal setting.

## 2022-08-21 NOTE — ED NOTES
Ar Hernandez contacted this RN through the hospital phone. Ar Hernandez states to this RN \"Latanya and her aid Destiney arranged her own care for herself while I was gone\". Ar Hernandez then continued to tell me that \"I was told several days ago that someone would be caring for Latanya while I was gone\". Ar Hernandez was unable to give me the specific name of the individual that notified her that someone would be caring for her.       Kathryn patelHelen M. [facility]  08/21/22 4019

## 2022-08-21 NOTE — ED NOTES
[facility] 78 office at this time to speak with APS representative.  Waiting for call back at this time     Joshua ValenzuelaEagleville Hospital  08/20/22 4624

## 2022-08-21 NOTE — ED NOTES
In to check on patient and verify contact information for patient family member Kali Hartman. Pt gave this RN permission to use her cell phone to contact Kali Hartman. Pt niece Kali Hartman answered phone immediately. I introduced myself to her and informed family with patients permission, that pt was to be discharged. Pt family member Kali Hartman then states \"Well I will not be able to pick her up and take her home because I am at my home a couple hours away\". I then asked Kali Hartman if there was a back up family member to care for this patient at home in place of her because patient states that Kali Hartman is her primary caretaker. Kali Hartman then states that \"I told Chelerandee Aguilera that I would be out of town until Tuesday because I need to see my family doctor at home\". According to patient, Kali Hartman left sometime yesterday an informed the patient that she would be coming back today. Kali Hartman then tells me Viktor Stephanie must've got confuse because I will not be back until Tuesday\". Kali Hartman then requested to speak with patient, during her conversation with patient, patient paused her conversation to ask me if she could be admitted to the hospital because she does not have anyone to care for her until her niece returns. This RN reported to providers Dr Hermelinda Reno and Dr Romain Puga about conversation with patients family.  Providers advised to contact [facility]     [facility]  08/20/22 5287

## 2022-08-21 NOTE — DISCHARGE INSTRUCTIONS
Do not hesitate to return to the emergency department if you are experiencing any new or worsening symptoms such as lightheadedness, dizziness, feel as if you are going to pass out, chest pain, shortness of breath, or if you have any other concerns. Apply zinc oxide cream to the sores in your perianal area as needed to keep them covered. Follow-up with your primary care provider as soon as possible regarding your emergency department visit today.

## 2022-08-21 NOTE — ED NOTES
In to straight cath patient, 3 small open sores were noted near the perineum and one small open sore was noted on the coccyx. Dr Kervin Odom and Dr Henry Grewal notified, Dr Kervin Odom to beside to assess open sores.  Zinc barrier cream applied an patients attends were changed     Allie Wang RN  08/21/22 0550

## 2022-08-21 NOTE — ED NOTES
Spoke with Dwight Espinoza from Adult protective services.       [facility]  08/21/22 0000

## 2022-08-21 NOTE — ED NOTES
Pt resting on cot with eyes closed, respirations are equal and unlabored.  Pt denies needs at this time, will continue to monitor, call light in reach      [facility]  08/21/22 1669

## 2022-08-21 NOTE — ONCOLOGY
Brief Note  Radiation Oncology    Ms. 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 She has completed 4 radiation treatments out of a planned 10 treatment fraction course, last radiation treatment received on 08/19/2022. She has been tolerating radiation therapy reasonably well overall. We will plan to further evaluate her present condition prior to next planned treatment on Monday 08/22/2022. If she is felt to be too deconditioned for treatment then will plan to suspend treatment until such time as she is strong enough to continue with therapy, other consideration with be for hospice care if she is unable to complete current treatment course. Continue care as per primary team. Please contact radiation oncology for any further questions or concerns, or if her condition were to change or worsen.     Ladi Wasserman  Radiation Oncology

## 2022-08-21 NOTE — ED NOTES
Pt wheelchaired to bathroom an had bowel movement, contaminating her urine sample.  Unable to collect urine specimen     Jase Toure RN  08/20/22 6510

## 2022-08-21 NOTE — CARE COORDINATION
08/21/22 1112   Readmission Assessment   Number of Days since last admission? 8-30 days   Previous Disposition Home with Home Health   Who is being Interviewed Patient   What was the patient's/caregiver's perception as to why they think they needed to return back to the hospital?   ([place] very weak and unwell.)   Did you visit your Primary Care Physician after you left the hospital, before you returned this time? Yes   Did you see a specialist, such as Cardiac, Pulmonary, Orthopedic Physician, etc. after you left the hospital? No   Who advised the patient to return to the hospital? Self-referral   Does the patient report anything that got in the way of taking their medications? No   In our efforts to provide the best possible care to you and others like you, can you think of anything that we could have done to help you after you left the hospital the first time, so that you might not have needed to return so soon?  Other (Comment)  (No.)

## 2022-08-21 NOTE — ED NOTES
Inocencio Moody, patients Brother contacted this RN via hospital phone. This RN asked patient for permission to speak with Inocencio Gamezler her brother. Pt granted this RN to speak to Alena Ponce, her brother, on patients behalf. Inocencio Moody asked this RN about the status of this patient.  Inocencio Moody was notified by myself that pt was originally supposed to be discharge but since no one is home to help take care of her, we are going to try to admit her to the hospital. Inocencio Moody then states to this RN \"I will not be home until August 25-26 so if you can keep her in the hospital until I or Shea Meme comes home, that would be great\"     Dilma eMnon RN  08/21/22 0864 [facility]  08/21/22 9248

## 2022-08-21 NOTE — ED NOTES
ED to inpatient nurses report    Chief Complaint   Patient presents with    Fatigue      Present to ED from home  LOC: alert and orientated to name, place, date  Vital signs   Vitals:    08/20/22 2225 08/21/22 0037 08/21/22 0208 08/21/22 0324   BP: (!) 145/64 (!) 148/47 (!) 125/57 (!) 126/42   Pulse: 64 66 67 68   Resp: 20 20 16 16   Temp:       SpO2: 95% 95% 95% 95%      Oxygen Baseline room air    Current needs required none   LDAs:    Mobility: Requires assistance * 2  Pending ED orders: none  Present condition: stable  Our promise was given to patient   Patient is Legally Blinde    C-SSRS Risk of Suicide: No Risk  Swallow Screening    Preferred Language: English     Electronically signed by Yaya Graves RN on 8/21/2022 at 3:55 AM       Yaya Graves, [facility]  08/21/22 1972
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
## 2022-08-21 NOTE — ED NOTES
Attempted to contact pt Anusha hernandez at this time, no answer and no voicemail box available     Beka Padron RN  08/20/22 6723

## 2022-08-21 NOTE — ED NOTES
This RN spoke with patient privately about her home situation today. Pt states \"Krista left town yesterday because she needed to see her family doctor\". This RN then asked her if Milo Leaver assigned someone to care for patient whilst Milo Leaver was gone and pt reports \"No\". I then asked Patient how often her Aid comes to see her and pt states \"Destiney comes and sees me on Mondays and Fridays\". I then asked patient who called 9-1-1 today and patient states that she \"called Milo Leaver and then Milo Leaver called my aid Delma Mitsuleman, then Delma Mitten came over and helped me push my life alert button\". Pt additionally reports \"I needed help because I was so tired and have ate since krista left yesterday\".       [facility]  08/20/22 0260

## 2022-08-21 NOTE — ED NOTES
Pt resting on cot with eyes closed, respirations are equal and unlabored.  Call light in reach, will continue to monitor      JHOAN LOPEZ RN  08/21/22 [id number]

## 2022-08-21 NOTE — ED NOTES
Pt resting on cot with eyes closed, respirations are equal and unlabored.  Pt denies needs at this time, will continue to monitor     Braulio Mason RN  08/21/22 [id number]

## 2022-08-21 NOTE — ED NOTES
Pt resting on cot with eyes closed, BP cuff was off patient and on the floor,. Vital signs reassessed at this time.  Pt remains alert and oriented, respirations are equal and unlabored     JHOAN LOPEZ RN  08/20/22 2010
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
## 2022-08-21 NOTE — ED NOTES
Attempted to contact patients family for the second time for discharge.  Unable to get a hold of patients stanley [facility], [facility]  08/20/22 6239

## 2022-08-22 ENCOUNTER — HOSPITAL ENCOUNTER (OUTPATIENT)
Dept: RADIATION ONCOLOGY | Age: 87
Discharge: HOME | End: 2022-08-22
Payer: MEDICARE

## 2022-08-22 ENCOUNTER — CARE COORDINATION (OUTPATIENT)
Dept: CARE COORDINATION | Age: 87
End: 2022-08-22

## 2022-08-22 LAB
ANION GAP SERPL CALCULATED.3IONS-SCNC: 9 MEQ/L (ref 8–16)
BASOPHILS # BLD: 0.1 %
BASOPHILS ABSOLUTE: 0 THOU/MM3 (ref 0–0.1)
BUN BLDV-MCNC: 20 MG/DL (ref 7–22)
CALCIUM SERPL-MCNC: 8.4 MG/DL (ref 8.5–10.5)
CHLORIDE BLD-SCNC: 102 MEQ/L (ref 98–111)
CO2: 25 MEQ/L (ref 23–33)
CREAT SERPL-MCNC: 0.7 MG/DL (ref 0.4–1.2)
EOSINOPHIL # BLD: 1.2 %
EOSINOPHILS ABSOLUTE: 0.1 THOU/MM3 (ref 0–0.4)
ERYTHROCYTE [DISTWIDTH] IN BLOOD BY AUTOMATED COUNT: 16.9 % (ref 11.5–14.5)
ERYTHROCYTE [DISTWIDTH] IN BLOOD BY AUTOMATED COUNT: 57.1 FL (ref 35–45)
GFR SERPL CREATININE-BSD FRML MDRD: 79 ML/MIN/1.73M2
GLUCOSE BLD-MCNC: 117 MG/DL (ref 70–108)
GLUCOSE BLD-MCNC: 121 MG/DL (ref 70–108)
HCT VFR BLD CALC: 37.4 % (ref 37–47)
HEMOGLOBIN: 11.5 GM/DL (ref 12–16)
IMMATURE GRANS (ABS): 0.08 THOU/MM3 (ref 0–0.07)
IMMATURE GRANULOCYTES: 0.9 %
LYMPHOCYTES # BLD: 17.2 %
LYMPHOCYTES ABSOLUTE: 1.5 THOU/MM3 (ref 1–4.8)
MCH RBC QN AUTO: 28.5 PG (ref 26–33)
MCHC RBC AUTO-ENTMCNC: 30.7 GM/DL (ref 32.2–35.5)
MCV RBC AUTO: 92.6 FL (ref 81–99)
MONOCYTES # BLD: 6.4 %
MONOCYTES ABSOLUTE: 0.6 THOU/MM3 (ref 0.4–1.3)
NUCLEATED RED BLOOD CELLS: 0 /100 WBC
PLATELET # BLD: 61 THOU/MM3 (ref 130–400)
PMV BLD AUTO: 11.7 FL (ref 9.4–12.4)
POTASSIUM SERPL-SCNC: 4.4 MEQ/L (ref 3.5–5.2)
RBC # BLD: 4.04 MILL/MM3 (ref 4.2–5.4)
SEG NEUTROPHILS: 74.2 %
SEGMENTED NEUTROPHILS ABSOLUTE COUNT: 6.6 THOU/MM3 (ref 1.8–7.7)
SODIUM BLD-SCNC: 136 MEQ/L (ref 135–145)
WBC # BLD: 8.9 THOU/MM3 (ref 4.8–10.8)

## 2022-08-22 PROCEDURE — 1200000000 HC SEMI PRIVATE

## 2022-08-22 PROCEDURE — 36415 COLL VENOUS BLD VENIPUNCTURE: CPT

## 2022-08-22 PROCEDURE — 6370000000 HC RX 637 (ALT 250 FOR IP): Performed by: INTERNAL MEDICINE

## 2022-08-22 PROCEDURE — G6002 STEREOSCOPIC X-RAY GUIDANCE: HCPCS | Performed by: RADIOLOGY

## 2022-08-22 PROCEDURE — 77387 GUIDANCE FOR RADJ TX DLVR: CPT | Performed by: RADIOLOGY

## 2022-08-22 PROCEDURE — 6360000002 HC RX W HCPCS: Performed by: INTERNAL MEDICINE

## 2022-08-22 PROCEDURE — 77336 RADIATION PHYSICS CONSULT: CPT | Performed by: RADIOLOGY

## 2022-08-22 PROCEDURE — 77412 RADIATION TX DELIVERY LVL 3: CPT | Performed by: RADIOLOGY

## 2022-08-22 PROCEDURE — 85025 COMPLETE CBC W/AUTO DIFF WBC: CPT

## 2022-08-22 PROCEDURE — 6370000000 HC RX 637 (ALT 250 FOR IP): Performed by: STUDENT IN AN ORGANIZED HEALTH CARE EDUCATION/TRAINING PROGRAM

## 2022-08-22 PROCEDURE — 82948 REAGENT STRIP/BLOOD GLUCOSE: CPT

## 2022-08-22 PROCEDURE — 94640 AIRWAY INHALATION TREATMENT: CPT

## 2022-08-22 PROCEDURE — 77427 RADIATION TX MANAGEMENT X5: CPT | Performed by: RADIOLOGY

## 2022-08-22 PROCEDURE — 80048 BASIC METABOLIC PNL TOTAL CA: CPT

## 2022-08-22 RX ORDER — CALCIUM CARBONATE 200(500)MG
500 TABLET,CHEWABLE ORAL 3 TIMES DAILY PRN
Status: DISCONTINUED | OUTPATIENT
Start: 2022-08-22 | End: 2022-08-25 | Stop reason: HOSPADM

## 2022-08-22 RX ADMIN — LEVOTHYROXINE SODIUM 100 MCG: 0.1 TABLET ORAL at 06:01

## 2022-08-22 RX ADMIN — ACETAMINOPHEN 650 MG: 325 TABLET ORAL at 15:52

## 2022-08-22 RX ADMIN — SERTRALINE 50 MG: 50 TABLET, FILM COATED ORAL at 08:15

## 2022-08-22 RX ADMIN — Medication 1 TABLET: at 08:15

## 2022-08-22 RX ADMIN — ANTACID TABLETS 500 MG: 500 TABLET, CHEWABLE ORAL at 04:22

## 2022-08-22 RX ADMIN — CETIRIZINE HYDROCHLORIDE 5 MG: 10 TABLET, FILM COATED ORAL at 08:15

## 2022-08-22 RX ADMIN — MIDODRINE HYDROCHLORIDE 2.5 MG: 2.5 TABLET ORAL at 06:01

## 2022-08-22 RX ADMIN — LEVETIRACETAM 500 MG: 500 TABLET, FILM COATED ORAL at 08:15

## 2022-08-22 RX ADMIN — ISOSORBIDE MONONITRATE 30 MG: 30 TABLET, EXTENDED RELEASE ORAL at 08:15

## 2022-08-22 RX ADMIN — METOPROLOL TARTRATE 12.5 MG: 25 TABLET, FILM COATED ORAL at 08:15

## 2022-08-22 RX ADMIN — ALBUTEROL SULFATE 2.5 MG: 2.5 SOLUTION RESPIRATORY (INHALATION) at 04:42

## 2022-08-22 RX ADMIN — PREDNISONE 1 MG: 1 TABLET ORAL at 08:15

## 2022-08-22 RX ADMIN — PANTOPRAZOLE SODIUM 40 MG: 40 TABLET, DELAYED RELEASE ORAL at 06:01

## 2022-08-22 RX ADMIN — MIDODRINE HYDROCHLORIDE 2.5 MG: 2.5 TABLET ORAL at 12:16

## 2022-08-22 RX ADMIN — MIDODRINE HYDROCHLORIDE 2.5 MG: 2.5 TABLET ORAL at 17:42

## 2022-08-22 RX ADMIN — AMIODARONE HYDROCHLORIDE 200 MG: 200 TABLET ORAL at 08:15

## 2022-08-22 ASSESSMENT — PAIN DESCRIPTION - LOCATION: LOCATION: BACK

## 2022-08-22 ASSESSMENT — PAIN SCALES - GENERAL
PAINLEVEL_OUTOF10: 10
PAINLEVEL_OUTOF10: 0

## 2022-08-22 ASSESSMENT — PAIN DESCRIPTION - DESCRIPTORS: DESCRIPTORS: ACHING

## 2022-08-22 ASSESSMENT — PAIN DESCRIPTION - ORIENTATION: ORIENTATION: LOWER

## 2022-08-22 NOTE — PROGRESS NOTES
Comprehensive Nutrition Assessment    Type and Reason for Visit:  Initial, Positive Nutrition Screen    Nutrition Recommendations/Plan:   Continue diet as ordered and encourage PO intake at best efforts. Magic cups ordered BID. Monitor nutrition related lab values, PO intake, wound status, and provide further nutrition recommendations as necessary. Malnutrition Assessment:  Malnutrition Status: At risk for malnutrition (Comment) (08/22/22 1232)    Context:  Chronic Illness     Findings of the 6 clinical characteristics of malnutrition:  Energy Intake:  Mild decrease in energy intake (Comment) (pt reports appetite and intake has improved since admission)  Weight Loss:  Unable to assess (4% weight loss in 3 weeks but hard to assess d/t fluid shifts)     Body Fat Loss:  No significant body fat loss     Muscle Mass Loss:  No significant muscle mass loss    Fluid Accumulation:  Mild     Strength:  Not Performed    Nutrition Assessment:     Pt. nutritionally compromised AEB wounds. At risk for further nutrition compromise r/t increased nutrient needs for wound healing, presented to ED on 8/20 for evaluation of fatigue; undergoing radiation to brain d/t metastatic breast cancer; s/p craniotomy for resection / debulking of the right-sided intracranial metastatic tumor on 05/11/22 underlying medical condition (PMH: breast cancer, colostomy 2016; GERD, MDRO, sigmoid diverticulosis). Nutrition Related Findings:      Wound Type: Stage II (sacrum and buttocks)     Pt.  Report/Treatments/Miscellaneous: Spoke with patient at bedside this morning; pt reports appetite and intake normal; pt reports she drinks 2-4 boosts at home; pt does not like ensures but agreeable to trying magic cups to promote wound healing; pt ate all of breakfast this morning; pt lives at home and receives help from family   GI Status: last BM 8/21; denies nausea   Pertinent Labs: hgb 11.5  Pertinent Meds: colace, keppra, lopressor, MVI, protonix, prednisone     Current Nutrition Intake & Therapies:    Average Meal Intake: 51-75%, 26-50% (pt reports good PO intake and appetite while here)  Average Supplements Intake:  (magic cups to start BID)  ADULT DIET; Regular; Low Fat/Low Chol/High Fiber/2 gm Na  ADULT ORAL NUTRITION SUPPLEMENT; Lunch, Dinner; Frozen Oral Supplement    Anthropometric Measures:  Height: 5' (152.4 cm)  Ideal Body Weight (IBW): 100 lbs (45 kg)    Admission Body Weight: 134 lb 14.4 oz (61.2 kg) (8/21: BLE +1 nonpitting edema)  Current Body Weight: 134 lb 14.4 oz (61.2 kg) (8/21: BLE +1 nonpitting edema), 134.9 % IBW. Weight Source: Bed Scale  Current BMI (kg/m2): 26.3  Usual Body Weight:  (pt unsure but reports ~135 lbs; wt hx per EMR: 7/29/22: 138 lbs 3.2 oz, 7/31/22: 133 lbs 3.2 oz, 8/1/22: 134 lns 8 oz, 8/9/22: 132 lbs 3.2 oz, 8/18/22: 130 lbs 1.1 oz)                       BMI Categories: Overweight (BMI 25.0-29. 9)    Estimated Daily Nutrient Needs:  Energy Requirements Based On: Kcal/kg  Weight Used for Energy Requirements: Current  Energy (kcal/day): 5188-0123 kcals/day (20-25 kcals/kg)  Weight Used for Protein Requirements: Ideal  Protein (g/day): 54-67 g/day (1.2-1.5 g/kg IBW)       Nutrition Diagnosis:   Increased nutrient needs related to increase demand for energy/nutrients as evidenced by wounds    Nutrition Interventions:   Food and/or Nutrient Delivery: Continue Current Diet, Start Oral Nutrition Supplement  Nutrition Education/Counseling: Education initiated (encouraged PO intake at best efforts and use of ONS)  Coordination of Nutrition Care: Continue to monitor while inpatient       Goals:     Goals: Meet at least 75% of estimated needs, by next RD assessment       Nutrition Monitoring and Evaluation:      Food/Nutrient Intake Outcomes: Food and Nutrient Intake, Supplement Intake  Physical Signs/Symptoms Outcomes: Biochemical Data, GI Status, Weight, Skin, Nutrition Focused Physical Findings, Meal Time Behavior    Discharge Planning:    Continue Oral Nutrition Supplement     Lisa Warren RD  Contact: [phone]

## 2022-08-22 NOTE — PROGRESS NOTES
Wound ostomy consulted for stage 1 and 2 pressure injuries upon admission. Recommend primary nursing to follow wound care and ha order sets, offload areas.

## 2022-08-22 NOTE — PROGRESS NOTES
Treatment # 5 of 10 given today. Total dose to date 1500 cGy of planned dose 3000 cGy. Patient tolerated treatment fine. No other issues.

## 2022-08-22 NOTE — PROGRESS NOTES
[address] [facility] THERAPY MISSED TREATMENT NOTE  Dmitry Summers [facility] 5K  5K-17/017-A      Date: 2022  Patient Name: Carol Zhu        CSN: [id number]   : 1933  (80 y.o.)  Gender: female                REASON FOR MISSED TREATMENT:  started OT evaluation on pt, however transport arrived to take pt to radiation. OT helped assist pt to w/c from EOB with CGA for transfers and functional mobility. However unable to complete full evaluation on pt.  OTR to complete full evaluation next available date       Time in: 1407   Time out: 1413

## 2022-08-22 NOTE — CARE COORDINATION
8/22/22, 1:53 PM EDT    DISCHARGE PLANNING EVALUATION       Spoke with patient with she stated that she plans to return home when her niece returns. Spoke with Sat[facility] [employer]  [phone]. Spoke with Nicola Will form [facility] and updated. Left a message for niece to determine plan and will she will be returning to the area.

## 2022-08-22 NOTE — PLAN OF CARE
Problem: Skin/Tissue Integrity  Goal: Absence of new skin breakdown  Description: 1. Monitor for areas of redness and/or skin breakdown  2. Assess vascular access sites hourly  3. Every 4-6 hours minimum:  Change oxygen saturation probe site  4. Every 4-6 hours:  If on nasal continuous positive airway pressure, respiratory therapy assess nares and determine need for appliance change or resting period. 8/21/2022 2332 by Mich Mcconnell RN  Outcome: Progressing     Problem: ABCDS Injury Assessment  Goal: Absence of physical injury  8/21/2022 2332 by Mich Mcconnell RN  Outcome: Progressing  Flowsheets (Taken 8/21/2022 2332)  Absence of Physical Injury: Implement safety measures based on patient assessment     Problem: Safety - Adult  Goal: Free from fall injury  8/21/2022 2332 by Mich Mcconnell RN  Outcome: Progressing  Flowsheets (Taken 8/21/2022 2332)  Free From Fall Injury: Instruct family/caregiver on patient safety  Note: Fall assessment completed. Patient using call light appropriately to call for assistance with ambulation to bathroom. Personal items within reach. Patient is also compliant with use of non-skid slippers. Up with walker. Problem: Pain  Goal: Verbalizes/displays adequate comfort level or baseline comfort level  Outcome: Progressing  Flowsheets (Taken 8/21/2022 2332)  Verbalizes/displays adequate comfort level or baseline comfort level:   Encourage patient to monitor pain and request assistance   Assess pain using appropriate pain scale   Administer analgesics based on type and severity of pain and evaluate response   Implement non-pharmacological measures as appropriate and evaluate response  Note: No pain stated this shift.      Problem: Discharge Planning  Goal: Discharge to home or other facility with appropriate resources  Outcome: Progressing  Flowsheets (Taken 8/21/2022 2332)  Discharge to home or other facility with appropriate resources:   Identify barriers to discharge with patient and caregiver   Arrange for needed discharge resources and transportation as appropriate   Refer to discharge planning if patient needs post-hospital services based on physician order or complex needs related to functional status, cognitive ability or social support system   Care plan reviewed with patient. Patient verbalizes understanding of the plan of care and contribute to goal setting.
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
## 2022-08-22 NOTE — PLAN OF CARE
Problem: Skin/Tissue Integrity  Goal: Absence of new skin breakdown  Description: 1. Monitor for areas of redness and/or skin breakdown  2. Assess vascular access sites hourly  3. Every 4-6 hours minimum:  Change oxygen saturation probe site  4. Every 4-6 hours:  If on nasal continuous positive airway pressure, respiratory therapy assess nares and determine need for appliance change or resting period. Outcome: Progressing  Note: Skin assessment completed. Patient turned every 2 hours and as needed. No skin breakdown this shift. Problem: ABCDS Injury Assessment  Goal: Absence of physical injury  Outcome: Progressing  Absence of Physical Injury: Implement safety measures based on patient assessment     Problem: Safety - Adult  Goal: Free from fall injury  Outcome: Progressing  Flowsheets  Free From Fall Injury:   Instruct family/caregiver on patient safety   Based on caregiver fall risk screen, instruct family/caregiver to ask for assistance with transferring infant if caregiver noted to have fall risk factors    Free From Fall Injury: Instruct family/caregiver on patient safety   Pain Assessment: None - Denies Pain  Pain Level: 10   Patient's Stated Pain Goal: 3 Pt denies pain, will continue to monitor and reassess. Is pain goal met at this time? Yes     Discharge date and location are unclear at this time. Care plan reviewed with patient. Patient verbalizes understanding of the plan of care and contributes to goal setting.

## 2022-08-23 ENCOUNTER — HOSPITAL ENCOUNTER (OUTPATIENT)
Dept: RADIATION ONCOLOGY | Age: 87
Discharge: HOME | End: 2022-08-23
Payer: MEDICARE

## 2022-08-23 ENCOUNTER — APPOINTMENT (OUTPATIENT)
Dept: RADIATION ONCOLOGY | Age: 87
End: 2022-08-23
Payer: MEDICARE

## 2022-08-23 PROCEDURE — 1200000000 HC SEMI PRIVATE

## 2022-08-23 PROCEDURE — 97530 THERAPEUTIC ACTIVITIES: CPT

## 2022-08-23 PROCEDURE — G6002 STEREOSCOPIC X-RAY GUIDANCE: HCPCS | Performed by: RADIOLOGY

## 2022-08-23 PROCEDURE — 6370000000 HC RX 637 (ALT 250 FOR IP): Performed by: INTERNAL MEDICINE

## 2022-08-23 PROCEDURE — 77412 RADIATION TX DELIVERY LVL 3: CPT | Performed by: RADIOLOGY

## 2022-08-23 PROCEDURE — 97162 PT EVAL MOD COMPLEX 30 MIN: CPT

## 2022-08-23 PROCEDURE — 77387 GUIDANCE FOR RADJ TX DLVR: CPT | Performed by: RADIOLOGY

## 2022-08-23 PROCEDURE — 97166 OT EVAL MOD COMPLEX 45 MIN: CPT

## 2022-08-23 PROCEDURE — 97535 SELF CARE MNGMENT TRAINING: CPT

## 2022-08-23 RX ORDER — LORAZEPAM 0.5 MG/1
0.5 TABLET ORAL DAILY PRN
Status: DISCONTINUED | OUTPATIENT
Start: 2022-08-23 | End: 2022-08-25 | Stop reason: HOSPADM

## 2022-08-23 RX ADMIN — CETIRIZINE HYDROCHLORIDE 5 MG: 10 TABLET, FILM COATED ORAL at 08:05

## 2022-08-23 RX ADMIN — PANTOPRAZOLE SODIUM 40 MG: 40 TABLET, DELAYED RELEASE ORAL at 05:41

## 2022-08-23 RX ADMIN — Medication 1 TABLET: at 08:05

## 2022-08-23 RX ADMIN — SERTRALINE 50 MG: 50 TABLET, FILM COATED ORAL at 08:05

## 2022-08-23 RX ADMIN — MIDODRINE HYDROCHLORIDE 2.5 MG: 2.5 TABLET ORAL at 15:14

## 2022-08-23 RX ADMIN — METOPROLOL TARTRATE 12.5 MG: 25 TABLET, FILM COATED ORAL at 08:05

## 2022-08-23 RX ADMIN — LEVOTHYROXINE SODIUM 100 MCG: 0.1 TABLET ORAL at 05:41

## 2022-08-23 RX ADMIN — LEVETIRACETAM 500 MG: 500 TABLET, FILM COATED ORAL at 20:25

## 2022-08-23 RX ADMIN — ACETAMINOPHEN 650 MG: 325 TABLET ORAL at 15:05

## 2022-08-23 RX ADMIN — DOCUSATE SODIUM 100 MG: 100 CAPSULE, LIQUID FILLED ORAL at 08:05

## 2022-08-23 RX ADMIN — ISOSORBIDE MONONITRATE 30 MG: 30 TABLET, EXTENDED RELEASE ORAL at 08:05

## 2022-08-23 RX ADMIN — LEVETIRACETAM 500 MG: 500 TABLET, FILM COATED ORAL at 08:15

## 2022-08-23 RX ADMIN — PREDNISONE 1 MG: 1 TABLET ORAL at 08:05

## 2022-08-23 RX ADMIN — MIDODRINE HYDROCHLORIDE 2.5 MG: 2.5 TABLET ORAL at 17:49

## 2022-08-23 RX ADMIN — AMIODARONE HYDROCHLORIDE 200 MG: 200 TABLET ORAL at 08:05

## 2022-08-23 ASSESSMENT — PAIN SCALES - GENERAL: PAINLEVEL_OUTOF10: 5

## 2022-08-23 ASSESSMENT — PAIN - FUNCTIONAL ASSESSMENT: PAIN_FUNCTIONAL_ASSESSMENT: PREVENTS OR INTERFERES SOME ACTIVE ACTIVITIES AND ADLS

## 2022-08-23 ASSESSMENT — PAIN DESCRIPTION - LOCATION: LOCATION: BACK

## 2022-08-23 ASSESSMENT — PAIN DESCRIPTION - DESCRIPTORS: DESCRIPTORS: ACHING;DISCOMFORT

## 2022-08-23 ASSESSMENT — PAIN DESCRIPTION - ORIENTATION: ORIENTATION: MID

## 2022-08-23 NOTE — PLAN OF CARE
Problem: Skin/Tissue Integrity  Goal: Absence of new skin breakdown  Description: 1. Monitor for areas of redness and/or skin breakdown  2. Assess vascular access sites hourly  3. Every 4-6 hours minimum:  Change oxygen saturation probe site  4. Every 4-6 hours:  If on nasal continuous positive airway pressure, respiratory therapy assess nares and determine need for appliance change or resting period. Outcome: Progressing  Note: Pt. Remains free from new skin breakdown.      Problem: ABCDS Injury Assessment  Goal: Absence of physical injury  Outcome: Progressing  Flowsheets (Taken 8/23/2022 0258 by Arlene Gee RN)  Absence of Physical Injury: Implement safety measures based on patient assessment     Problem: Safety - Adult  Goal: Free from fall injury  Outcome: Progressing  Flowsheets (Taken 8/23/2022 0258 by Arlene Gee RN)  Free From Fall Injury: Instruct family/caregiver on patient safety     Problem: Pain  Goal: Verbalizes/displays adequate comfort level or baseline comfort level  Outcome: Progressing  Flowsheets (Taken 8/21/2022 6582 by Arlene Gee RN)  Verbalizes/displays adequate comfort level or baseline comfort level:   Encourage patient to monitor pain and request assistance   Assess pain using appropriate pain scale   Administer analgesics based on type and severity of pain and evaluate response   Implement non-pharmacological measures as appropriate and evaluate response     Problem: Discharge Planning  Goal: Discharge to home or other facility with appropriate resources  Outcome: Progressing  Flowsheets (Taken 8/23/2022 9842)  Discharge to home or other facility with appropriate resources:   Identify barriers to discharge with patient and caregiver   Arrange for needed discharge resources and transportation as appropriate   Identify discharge learning needs (meds, wound care, etc)     Problem: Nutrition Deficit:  Goal: Optimize nutritional status  Outcome: Progressing  Flowsheets (Taken 8/23/2022 8632)  Nutrient intake appropriate for improving, restoring, or maintaining nutritional needs:   Assess nutritional status and recommend course of action   Monitor oral intake, labs, and treatment plans   Care plan reviewed with patient. Patient verbalizes understanding of the plan of care and contribute to goal setting.
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
## 2022-08-23 NOTE — PROGRESS NOTES
Initial Evaluation          Patient:   Delia Tam  YOB: 1933  Age:  80 y.o. Room:  [facility]/017  MRN:  [id number]   Acct: [de-identified]    Date of Admission:  8/20/2022  3:46 PM  Date of Service:  8/23/2022  Completed By:  You Buitrago RN                 Reason for Palliative Care Evaluation:-             [] Code Status Discussion              [x] Goals of Care              [] Pain/Symptom Management               [] Emotional Support              [] Other:                   Current Issues:-  []  Pain  [x]  Fatigue  []  Nausea  [x]  Anxiety  []  Depression  []  Shortness of Breath  []  Constipation  []  Appetite  []  Other:             Advance Directives:- Patient would like new POA and Living Will admitted  [] [place] DNR Form  [x] Living Will  [x] Medical POA             Current Code Status:-  [] Full Resuscitation  [] DNR-Comfort Care-Arrest  [] DNR-Comfort Care       [x] Limited Resuscitation             [x] No CPR            [x] No shock            [x] No ET intubation/reintubation            [x] No resuscitative medications            [] Other limitation:              Palliative Performance Status:          [] 60%  Ambulation reduced; Significant disease;Can't do hobbies/housework; intake normal or reduced; occasional assist; LOC full/confusion        [x] 50%  Mainly sit/lie; Extensive disease; Can't do any work; Considerable assist; intake normal or reduced; LOC full/confusion        [] 40%  Mainly in bed; Extensive disease; Mainly assist; intake normal or reduced; LOC full/confusion         [] 30%  Bed Bound; Extensive disease; Total care; intake reduced; LOC full/confusion        [] 20%  Bed Bound; Extensive disease; Total care; intake minimal; Drowsy/coma        [] 10%  Bed Bound; Extensive disease;  Total care; Mouth care only; Drowsy/coma        [] 0  Death        Goals of care evaluation:-        The patient goals of care are to provide

## 2022-08-23 NOTE — PROGRESS NOTES
[address] [address]  INPATIENT PHYSICAL THERAPY  EVALUATION  [facility] ONC MED 5K - 5K-17/017-A    Time In: 7964  Time Out: 1501  Timed Code Treatment Minutes: 16 Minutes  Minutes: 24          Date: 2022  Patient Name: Sky Chatman,  Gender:  female        MRN: [id number]  : 1933  (80 y.o.)      Referring Practitioner: Lars Pulido MD  Diagnosis: general weakness  Additional Pertinent Hx: Per EMR \"The patient is a 80 y.o. 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 She had been treated with chemotherapy but no surgery for the left breast CA, she however did undergo craniotomy for resection / debulking of the right-sided intracranial metastatic tumor on . She has a history of GERD, DJD, HLD, hypothyroidism, MDRO resistance. She had recently been admitted with generalized weakness was noted to have 2 new round and confluent areas of low attenuation in the right parietal lobe and right temporal lobe,  worsening confluent white matter changes are noted within portions of right cerebral hemisphere, a 5-6 mm right to left midline shift in the level of the septum pellucidum findings were compatible with worsening neoplastic disease. she had been placed on dexamethasone and this was transition to oral dexamethasone and XRTof the brain  Mass was initiated. She had also been seen by the neurosurgical service who had recommended palliative care . Patient was seen by the oncology service ( Dr Kd Solis had indicated that the patient will be seen in the outpatient setting if she desires to pursue further treatment. She is yet to see him. Arrangements were made for outpatient radiation therapy and the patient's niece was to be living with the patient to assist with her needs  . The patient also has an aide come in.   The patient had radiation therapy on her brain on 2 days ago with Dr. Angelica Nelson transported by an adult transportation service and when she got home her niece was not home and had gone out of state to [place]. She is generally able to eat a  meal after the radiation therapy however upon arrival home there was no one there to cook for her. She indicated that she had difficulty ambulating because she felt weak and could not get it to her button to call for help. She   had felt fatigued and hungry. She was able to contact her aide who comes in mon-fri for 2 hrs at a time who had then contacted the patient's niece who was in [place] and according to the ED physician I was informed that the patient's niece thought that the patient had made arrangements for somebody to be there with her. The aide had contacted the EMS who had brought the patient to the ED. \"     Restrictions/Precautions:  Restrictions/Precautions: General Precautions, Fall Risk  Position Activity Restriction  Other position/activity restrictions: low vision, radiation every day in afternoon    Subjective:  Chart Reviewed: Yes  Patient assessed for rehabilitation services?: Yes  Family / Caregiver Present: No  Subjective: OK to see pt per nursing. Pt in bed when PT arrived, reports she has radiation soon and requesting to use the restroom.     General:  Overall Orientation Status: Within Functional Limits  Orientation Level: Oriented X4  Vision: Impaired  Vision Exceptions: Legally blind  Hearing: Exceptions to [facility]  Hearing Exceptions: Hard of hearing/hearing concerns       Pain: denies during session    Vitals: Vitals not assessed per clinical judgement, see nursing flowsheet    Social/Functional History:    Lives With: Other (comment) (niece)  Type of Home: House  Home Layout: One level  Home Access: Level entry  Home Equipment: [place], rolling     Bathroom Shower/Tub: Tub/Shower unit  Bathroom Equipment: Shower chair  Bathroom Accessibility: Walker accessible    Receives Help From: Family, Home health  ADL Assistance: Needs assistance  Toileting: Needs assistance  Homemaking Assistance: Needs assistance  Ambulation Assistance: Needs assistance  Transfer Assistance: Needs assistance    Active : No     Additional Comments: patient's niece is going to be available 24/7. OBJECTIVE:  Range of Motion:  Bilateral Lower Extremity: WFL    Strength:  Bilateral Lower Extremity: Impaired - grossly deconditioned    Balance:  Static Sitting Balance:  Supervision, Stand By Assistance  Dynamic Sitting Balance: Stand By Assistance  Static Standing Balance: Contact Guard Assistance  Dynamic Standing Balance: Contact Guard Assistance, X 1  Pt required assist with dc care following toileting tasks in standing, use of RW for support. Pt completed hand washing, required cues for maintaining RW in close proximity to self at all times to decrease falls, fair demo noted. Bed Mobility:  Supine to Sit: Stand By Assistance, X 1, with head of bed raised  Sit to Supine: Stand By Assistance, X 1, with head of bed flat   Scooting: Stand By Assistance  Cues for safety with completion with good demo  Transfers:  Sit to Stand: [employer], Minimal Assistance, X 1, with increased time for completion, cues for hand placement, with verbal cues  Stand to Sit:Contact Guard Assistance, X 1, with increased time for completion, cues for hand placement, with verbal cues  RW for support. Pt required cues for proper sequencing to decrease falls, maintain RW in close proximity to self at all times with fair demo noted and occasional assist for AD management  Ambulation:  Contact Guard Assistance, Minimal Assistance, X 1, with cues for safety, with verbal cues , with increased time for completion  Distance: 15 feet x2  Surface: Level Tile  Device:Rolling Walker  Gait Deviations:   Forward Flexed Posture, Slow Gillian, Decreased Step Length Bilaterally, Decreased Weight Shift Bilaterally, Decreased Gait Speed, Decreased Heel Strike Bilaterally, Mild Path Deviations, decreased foot clearance, drags B LE on ground and shuffles and Decreased Terminal Knee Extension  Min-mod A for AD management to decrease falls and for safety with sequencing for completion    Functional Outcome Measures: Completed  AM-PAC Inpatient Mobility without Stair Climbing Raw Score : 14  AM-PAC Inpatient without Stair Climbing T-Scale Score : 40.85    ASSESSMENT:  Activity Tolerance:  Patient tolerance of  treatment: fair. Reduced vision, decreased safety      Treatment Initiated: Treatment and education initiated within context of evaluation. Evaluation time included review of current medical information, gathering information related to past medical, social and functional history, completion of standardized testing, formal and informal observation of tasks, assessment of data and development of plan of care and goals. Treatment time included skilled education and facilitation of tasks to increase safety and independence with functional mobility for improved independence and quality of life. Assessment: Body Structures, Functions, Activity Limitations Requiring Skilled Therapeutic Intervention: Decreased functional mobility , Decreased cognition, Decreased endurance, Decreased balance, Decreased strength, Decreased vision/visual deficit, Decreased safe awareness, Decreased posture  Assessment: Pt presents with the deficits above, cont to require skilled PT services to increase strength, progress with functional mobility to increase safety and decrease risk for falls. Pt requires 1 person assist for functional tasks with use of RW for support and mod cues for safety with environment due to visual deficits. Therapy Prognosis: Good    Requires PT Follow-Up: Yes    Discharge Recommendations:  Discharge Recommendations: Continue to assess pending progress, Subacute/Skilled Nursing Facility, 24 hour supervision or assist, Patient would benefit from continued therapy after discharge    Patient Education:      . Patient Education  Education Given To: Patient  Education Provided: Role of Therapy, Plan of Care, Transfer Training, Low Vision Education, Equipment  Education Method: Verbal  Education Outcome: Verbalized understanding, Continued education needed       Equipment Recommendations:  Equipment Needed: No    Plan:  Current Treatment Recommendations: Strengthening, Balance training, Gait training, Functional mobility training, Neuromuscular re-education, Transfer training, Endurance training, Patient/Caregiver education & training, Therapeutic activities, Safety education & training, Home exercise program, Equipment evaluation, education, & procurement  Plan:  (5x GM)    Goals:  Patient goals : return home with neice  Short Term Goals  Time Frame for Short term goals: by discharge  Short term goal 1: Pt will amb for >50 feet to complete HH distances with S for safety to progress with overall mobility. Short term goal 2: Pt will demo S for transfers with RW for support with good safety to progress towards PLOF. Short term goal 3: Pt will demo IND with bed mobility tasks with bed flat to return home safely. Short term goal 4: Pt will tolerate 10-20 reps of ther ex to increase overall mobility. Long Term Goals  Time Frame for Long term goals : NA due to short ELOS    Following session, patient left in safe position with all fall risk precautions in place. Back in bed, all needs in reach,alarm on.
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
## 2022-08-24 ENCOUNTER — HOSPITAL ENCOUNTER (OUTPATIENT)
Dept: RADIATION ONCOLOGY | Age: 87
Discharge: HOME | End: 2022-08-24
Payer: MEDICARE

## 2022-08-24 VITALS
HEART RATE: 65 BPM | HEIGHT: 60 IN | DIASTOLIC BLOOD PRESSURE: 67 MMHG | WEIGHT: 134.9 LBS | RESPIRATION RATE: 16 BRPM | BODY MASS INDEX: 26.48 KG/M2 | OXYGEN SATURATION: 92 % | TEMPERATURE: 97.5 F | SYSTOLIC BLOOD PRESSURE: 145 MMHG

## 2022-08-24 LAB
KAPPA/LAMBDA FREE LIGHT CHAINS: NORMAL
PLATELET AB, INDIRECT: NORMAL

## 2022-08-24 PROCEDURE — 77412 RADIATION TX DELIVERY LVL 3: CPT | Performed by: RADIOLOGY

## 2022-08-24 PROCEDURE — 1200000000 HC SEMI PRIVATE

## 2022-08-24 PROCEDURE — G6002 STEREOSCOPIC X-RAY GUIDANCE: HCPCS | Performed by: RADIOLOGY

## 2022-08-24 PROCEDURE — 77387 GUIDANCE FOR RADJ TX DLVR: CPT | Performed by: RADIOLOGY

## 2022-08-24 PROCEDURE — 97116 GAIT TRAINING THERAPY: CPT

## 2022-08-24 PROCEDURE — 97110 THERAPEUTIC EXERCISES: CPT

## 2022-08-24 PROCEDURE — 6370000000 HC RX 637 (ALT 250 FOR IP): Performed by: INTERNAL MEDICINE

## 2022-08-24 PROCEDURE — 97535 SELF CARE MNGMENT TRAINING: CPT

## 2022-08-24 RX ADMIN — AMIODARONE HYDROCHLORIDE 200 MG: 200 TABLET ORAL at 08:22

## 2022-08-24 RX ADMIN — PANTOPRAZOLE SODIUM 40 MG: 40 TABLET, DELAYED RELEASE ORAL at 08:22

## 2022-08-24 RX ADMIN — Medication 1 TABLET: at 08:23

## 2022-08-24 RX ADMIN — DOCUSATE SODIUM 100 MG: 100 CAPSULE, LIQUID FILLED ORAL at 08:23

## 2022-08-24 RX ADMIN — PREDNISONE 1 MG: 1 TABLET ORAL at 08:22

## 2022-08-24 RX ADMIN — LEVETIRACETAM 500 MG: 500 TABLET, FILM COATED ORAL at 08:22

## 2022-08-24 RX ADMIN — DOCUSATE SODIUM 100 MG: 100 CAPSULE, LIQUID FILLED ORAL at 20:53

## 2022-08-24 RX ADMIN — ACETAMINOPHEN 650 MG: 325 TABLET ORAL at 12:33

## 2022-08-24 RX ADMIN — LEVETIRACETAM 500 MG: 500 TABLET, FILM COATED ORAL at 20:53

## 2022-08-24 RX ADMIN — CETIRIZINE HYDROCHLORIDE 5 MG: 10 TABLET, FILM COATED ORAL at 08:23

## 2022-08-24 RX ADMIN — METOPROLOL TARTRATE 12.5 MG: 25 TABLET, FILM COATED ORAL at 20:53

## 2022-08-24 RX ADMIN — MIDODRINE HYDROCHLORIDE 2.5 MG: 2.5 TABLET ORAL at 08:22

## 2022-08-24 RX ADMIN — LEVOTHYROXINE SODIUM 100 MCG: 0.1 TABLET ORAL at 08:22

## 2022-08-24 RX ADMIN — Medication 1 TABLET: at 08:22

## 2022-08-24 RX ADMIN — ISOSORBIDE MONONITRATE 30 MG: 30 TABLET, EXTENDED RELEASE ORAL at 08:22

## 2022-08-24 RX ADMIN — MIDODRINE HYDROCHLORIDE 2.5 MG: 2.5 TABLET ORAL at 12:33

## 2022-08-24 RX ADMIN — MIDODRINE HYDROCHLORIDE 2.5 MG: 2.5 TABLET ORAL at 19:02

## 2022-08-24 RX ADMIN — SERTRALINE 50 MG: 50 TABLET, FILM COATED ORAL at 08:22

## 2022-08-24 ASSESSMENT — PAIN DESCRIPTION - LOCATION: LOCATION: BACK

## 2022-08-24 ASSESSMENT — PAIN SCALES - GENERAL: PAINLEVEL_OUTOF10: 5

## 2022-08-24 ASSESSMENT — PAIN DESCRIPTION - ORIENTATION: ORIENTATION: LOWER

## 2022-08-24 ASSESSMENT — PAIN DESCRIPTION - DESCRIPTORS: DESCRIPTORS: ACHING;DISCOMFORT

## 2022-08-24 ASSESSMENT — PAIN - FUNCTIONAL ASSESSMENT: PAIN_FUNCTIONAL_ASSESSMENT: ACTIVITIES ARE NOT PREVENTED
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
## 2022-08-24 NOTE — CARE COORDINATION
8/24/22, 10:29 AM EDT    DISCHARGE PLANNING EVALUATION    Therapy recommending continuing PT/OT at discharge, ALEX met with pt, she is agreeable to this. ALEX did message provider asking for orders for home health if in agreement. 10:40 AM  ALEX did call Valery with [employer], updated on anticipating discharge this evening when niece gets here. Alex did call Ninfa Ramboalex with [facility], left a voicemail regarding anticipating discharge this evening when niece gets back. 12:03 PM  Call to [facility], spoke with Jen Love, updated on anticipating discharge this evening when pt's niece back in town. ALEX did fax home health order to [phone] per request.     12:19 PM  SW reviewed therapy notes, recommendation for SNF. ALEX met with pt regarding this, pt declines, stating \"I always take my therapy at home\". 8/24/22, 12:20 PM EDT    Patient goals/plan/ treatment preferences discussed by  and . Patient goals/plan/ treatment preferences reviewed with patient/ family. Patient/ family verbalize understanding of discharge plan and are in agreement with goal/plan/treatment preferences. Understanding was demonstrated using the teach back method. AVS provided by RN at time of discharge, which includes all necessary medical information pertaining to the patients current course of illness, treatment, post-discharge goals of care, and treatment preferences.      Services At/After Discharge: [facility]       IMM Letter  IMM Letter given to Patient/Family/Significant other/Guardian/POA/by[de-identified] PT Access  IMM Letter date given[de-identified] 08/21/22  IMM Letter time given[de-identified] 7751

## 2022-08-24 NOTE — PLAN OF CARE
Problem: Skin/Tissue Integrity  Goal: Absence of new skin breakdown  Description: 1. Monitor for areas of redness and/or skin breakdown  2. Assess vascular access sites hourly  3. Every 4-6 hours minimum:  Change oxygen saturation probe site  4. Every 4-6 hours:  If on nasal continuous positive airway pressure, respiratory therapy assess nares and determine need for appliance change or resting period. 8/24/2022 0131 by Brittni Menezes RN  Outcome: Progressing  Note: No skin breakdown this shift. Patient being assisted with turning. Patients states understanding of repositioning every two hours. Problem: ABCDS Injury Assessment  Goal: Absence of physical injury  8/24/2022 0131 by Brittni Menezes RN  Outcome: Progressing  Flowsheets  Taken 8/24/2022 0131 by Brittni Menezes RN  Absence of Physical Injury: Implement safety measures based on patient assessment    Problem: Safety - Adult  Goal: Free from fall injury  8/24/2022 0131 by Brittni Menezes RN  Outcome: Progressing  Flowsheets  Taken 8/24/2022 0131 by Brittni Menezes RN  Free From Fall Injury: Instruct family/caregiver on patient safety  Note: Fall assessment completed. Patient using call light appropriately to call for assistance with ambulation to bathroom. Personal items within reach. Patient is also compliant with use of non-skid slippers. Problem: Pain  Goal: Verbalizes/displays adequate comfort level or baseline comfort level  8/24/2022 0131 by Brittni Menezes RN  Outcome: Progressing  Flowsheets (Taken 8/24/2022 0131)  Verbalizes/displays adequate comfort level or baseline comfort level:   Encourage patient to monitor pain and request assistance   Assess pain using appropriate pain scale   Administer analgesics based on type and severity of pain and evaluate response   Implement non-pharmacological measures as appropriate and evaluate response  Note: No pain stated this shift.      Problem: Discharge Planning  Goal: Discharge to home or other facility with appropriate resources  8/24/2022 0131 by Shawnee Cruz RN  Outcome: Progressing  Flowsheets (Taken 8/24/2022 0131)  Discharge to home or other facility with appropriate resources:   Identify barriers to discharge with patient and caregiver   Arrange for needed discharge resources and transportation as appropriate   Identify discharge learning needs (meds, wound care, etc)  Note: Discharge plan is to return home with niece. Problem: Nutrition Deficit:  Goal: Optimize nutritional status  8/24/2022 0131 by Shawnee Cruz RN  Outcome: Progressing  Flowsheets (Taken 8/24/2022 0131)  Nutrient intake appropriate for improving, restoring, or maintaining nutritional needs:   Assess nutritional status and recommend course of action   Monitor oral intake, labs, and treatment plans   Care plan reviewed with patient. Patient verbalizes understanding of the plan of care and contribute to goal setting.
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
## 2022-08-24 NOTE — PROGRESS NOTES
Treatment # 7 of 10 given today. Total dose to date 2100 cGy of planned dose 3000 cGy. Patient tolerated treatment fine. No other issues.
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
## 2022-08-24 NOTE — PLAN OF CARE
Problem: Skin/Tissue Integrity  Goal: Absence of new skin breakdown  Description: 1. Monitor for areas of redness and/or skin breakdown  2. Assess vascular access sites hourly  3. Every 4-6 hours minimum:  Change oxygen saturation probe site  4. Every 4-6 hours:  If on nasal continuous positive airway pressure, respiratory therapy assess nares and determine need for appliance change or resting period. Outcome: Progressing  Note: Pt.  Remains free from new skin breakdown     Problem: ABCDS Injury Assessment  Goal: Absence of physical injury  Outcome: Progressing  Flowsheets (Taken 8/24/2022 1557)  Absence of Physical Injury: Implement safety measures based on patient assessment     Problem: Safety - Adult  Goal: Free from fall injury  Outcome: Progressing  Flowsheets (Taken 8/24/2022 1557)  Free From Fall Injury: Instruct family/caregiver on patient safety     Problem: Pain  Goal: Verbalizes/displays adequate comfort level or baseline comfort level  Outcome: Progressing  Flowsheets (Taken 8/24/2022 0800)  Verbalizes/displays adequate comfort level or baseline comfort level:   Encourage patient to monitor pain and request assistance   Assess pain using appropriate pain scale   Administer analgesics based on type and severity of pain and evaluate response   Implement non-pharmacological measures as appropriate and evaluate response     Problem: Discharge Planning  Goal: Discharge to home or other facility with appropriate resources  Outcome: Progressing  Flowsheets (Taken 8/24/2022 0820)  Discharge to home or other facility with appropriate resources:   Identify barriers to discharge with patient and caregiver   Arrange for needed discharge resources and transportation as appropriate   Identify discharge learning needs (meds, wound care, etc)     Problem: Nutrition Deficit:  Goal: Optimize nutritional status  Outcome: Progressing  Flowsheets (Taken 8/24/2022 0131 by Fany Díaz RN)  Nutrient intake appropriate for improving, restoring, or maintaining nutritional needs:   Assess nutritional status and recommend course of action   Monitor oral intake, labs, and treatment plans   Care plan reviewed with patient. Patient verbalizes understanding of the plan of care and contribute to goal setting.

## 2022-08-24 NOTE — PROGRESS NOTES
[facility]  INPATIENT PHYSICAL THERAPY  DAILY NOTE  [facility] ONC MED 5K - 5K-17/017-A  Time In: 0730  Time Out: 0800  Timed Code Treatment Minutes: 30 Minutes  Minutes: 30          Date: 2022  Patient Name: Gricelda Davies,  Gender:  female        MRN: [id number]  : 1933  (80 y.o.)     Referring Practitioner: Sylvia Villafana MD  Diagnosis: general weakness  Additional Pertinent Hx: Per EMR \"The patient is a 80 y.o. 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 She had been treated with chemotherapy but no surgery for the left breast CA, she however did undergo craniotomy for resection / debulking of the right-sided intracranial metastatic tumor on . She has a history of GERD, DJD, HLD, hypothyroidism, MDRO resistance. She had recently been admitted with generalized weakness was noted to have 2 new round and confluent areas of low attenuation in the right parietal lobe and right temporal lobe,  worsening confluent white matter changes are noted within portions of right cerebral hemisphere, a 5-6 mm right to left midline shift in the level of the septum pellucidum findings were compatible with worsening neoplastic disease. she had been placed on dexamethasone and this was transition to oral dexamethasone and XRTof the brain  Mass was initiated. She had also been seen by the neurosurgical service who had recommended palliative care . Patient was seen by the oncology service ( Dr Melvina Barkley had indicated that the patient will be seen in the outpatient setting if she desires to pursue further treatment. She is yet to see him. Arrangements were made for outpatient radiation therapy and the patient's niece was to be living with the patient to assist with her needs  . The patient also has an aide come in.   The patient had radiation therapy on her brain on 2 days ago with Dr. Evelin Reyes transported by an adult 1, with increased time for completion, cues for hand placement, with verbal cues  Stand to [facility], Minimal Assistance, X 1, cues for hand placement, with verbal cues  RW for support once in standing, required cues for proper technique of completion with increased time due to pt being blind. Cues for maintaining RW in close proximity to self at all times to decrease falls, fair demo and decreased carryover. Once pt in chair, required mod cues for scooting for comfort with BUE for support to position self in chair for comfort. Ambulation:  Contact Guard Assistance, X 1, with cues for safety, with verbal cues , with increased time for completion  Distance: 10 feet, 15 feet  Surface: Level Tile  Device:Rolling Walker  Gait Deviations: Forward Flexed Posture, Slow Gillian, Decreased Step Length Bilaterally, Decreased Weight Shift Bilaterally, Decreased Gait Speed, and Decreased Heel Strike Bilaterally, decreased foot clearance. Pt required frequent cues for commands with amb to chair as pt has visual impairments for directional changes. Pt required min A for maintaining RW in close proximity to self at all times to decrease falls with fair demo and decreased carryover. Balance:  Static Sitting Balance:  Supervision, Stand By Assistance  Static Standing Balance: Contact Guard Assistance  B UE forearms on counter with hand washing and required 1 UE support when reaching for towels. Exercise:  Patient was guided in 1 set(s) 10 reps of exercise to both lower extremities. Ankle pumps, Glut sets, Quad sets, Heelslides, Hip abduction/adduction, and Straight leg raises. Exercises were completed for increased independence with functional mobility. VC and visual cues for proper technique of exercises for completion with good demo.      Functional Outcome Measures: Completed  AM-PAC Inpatient Mobility without Stair Climbing Raw Score : 13  AM-PAC Inpatient without Stair Climbing T-Scale Score : 38.96    ASSESSMENT:  Assessment: Patient progressing toward established goals. and cont to require cues for safety with RW for support and for room navigation due to visual impairment. Activity Tolerance:  Patient tolerance of  treatment: fair. Weakness and decreased endurance     Equipment Recommendations:Equipment Needed: No  Discharge Recommendations: Subacte/Skilled Nursing Facility, 24 hour assistance or supervision, and Patient would benefit from continued PT at discharge Pt would benefit from SNF stay prior to home return home to increase strength, safety and overall mobility to decrease falls in the home. Plan: Current Treatment Recommendations: Strengthening, Balance training, Gait training, Functional mobility training, Neuromuscular re-education, Transfer training, Endurance training, Patient/Caregiver education & training, Therapeutic activities, Safety education & training, Home exercise program, Equipment evaluation, education, & procurement  Plan:  (5x GM)    Patient Education  Patient Education: Plan of Care, Equipment Education, Transfers, Gait, Use of Gait [place], Verbal Exercise Instruction,  - Patient Verbalized Understanding, - Patient Requires Continued Education    Goals:  Patient goals : return home with neice  Short Term Goals  Time Frame for Short term goals: by discharge  Short term goal 1: Pt will amb for >50 feet to complete HH distances with S for safety to progress with overall mobility. Short term goal 2: Pt will demo S for transfers with RW for support with good safety to progress towards PLOF. Short term goal 3: Pt will demo IND with bed mobility tasks with bed flat to return home safely. Short term goal 4: Pt will tolerate 10-20 reps of ther ex to increase overall mobility. Long Term Goals  Time Frame for Long term goals : NA due to short ELOS    Following session, patient left in safe position with all fall risk precautions in place.  In beside chair, all needs in reach, alarm on.

## 2022-08-25 ENCOUNTER — CARE COORDINATION (OUTPATIENT)
Dept: CASE MANAGEMENT | Age: 87
End: 2022-08-25

## 2022-08-25 ENCOUNTER — HOSPITAL ENCOUNTER (OUTPATIENT)
Dept: RADIATION ONCOLOGY | Age: 87
Discharge: HOME | End: 2022-08-25
Payer: MEDICARE

## 2022-08-25 ENCOUNTER — TELEPHONE (OUTPATIENT)
Dept: FAMILY MEDICINE CLINIC | Age: 87
End: 2022-08-25

## 2022-08-25 VITALS
HEART RATE: 74 BPM | BODY MASS INDEX: 26.17 KG/M2 | TEMPERATURE: 98 F | DIASTOLIC BLOOD PRESSURE: 49 MMHG | WEIGHT: 134 LBS | SYSTOLIC BLOOD PRESSURE: 100 MMHG | OXYGEN SATURATION: 92 % | RESPIRATION RATE: 16 BRPM

## 2022-08-25 DIAGNOSIS — F41.9 ANXIETY: Primary | ICD-10-CM

## 2022-08-25 DIAGNOSIS — C50.919 BREAST CANCER METASTASIZED TO BRAIN, UNSPECIFIED LATERALITY (HCC): ICD-10-CM

## 2022-08-25 DIAGNOSIS — C79.31 BREAST CANCER METASTASIZED TO BRAIN, UNSPECIFIED LATERALITY (HCC): ICD-10-CM

## 2022-08-25 DIAGNOSIS — R53.1 GENERALIZED WEAKNESS: Primary | ICD-10-CM

## 2022-08-25 DIAGNOSIS — G44.89 OTHER HEADACHE SYNDROME: Primary | ICD-10-CM

## 2022-08-25 PROCEDURE — 77387 GUIDANCE FOR RADJ TX DLVR: CPT | Performed by: RADIOLOGY

## 2022-08-25 PROCEDURE — 1111F DSCHRG MED/CURRENT MED MERGE: CPT | Performed by: PHYSICAL MEDICINE & REHABILITATION

## 2022-08-25 PROCEDURE — 77412 RADIATION TX DELIVERY LVL 3: CPT | Performed by: RADIOLOGY

## 2022-08-25 PROCEDURE — G6002 STEREOSCOPIC X-RAY GUIDANCE: HCPCS | Performed by: RADIOLOGY

## 2022-08-25 RX ORDER — HYDROCODONE BITARTRATE AND ACETAMINOPHEN 5; 325 MG/1; MG/1
1 TABLET ORAL EVERY 6 HOURS PRN
Qty: 60 TABLET | Refills: 0 | Status: ON HOLD | OUTPATIENT
Start: 2022-08-25 | End: 2022-09-08 | Stop reason: HOSPADM

## 2022-08-25 ASSESSMENT — PAIN SCALES - GENERAL: PAINLEVEL_OUTOF10: 10

## 2022-08-25 ASSESSMENT — PAIN DESCRIPTION - LOCATION: LOCATION: BACK

## 2022-08-25 NOTE — PROGRESS NOTES
[facility] [address], [address]        [place] [facility] Cargo: [phone]        F: [phone]       [employer]            Dr. Israel Osorio MD MS          Dr. Nubia Mora MD PhD    ON TREATMENT VISIT (OTV) NOTE     Date of Service: 2022  Patient ID: Gisela Haq   : 1933  MRN: [id number]   Acct Number: [de-identified]     RADIATION ONCOLOGY ATTENDING:  Viki Cosby MD MS    DIAGNOSIS:  Cancer Staging  Metastatic breast cancer [facility])  Staging form: Breast, AJCC 8th Edition  - Clinical stage from 5/10/2018: Stage IV (rcT1c, cN0, cM1, G3, ER+, OH+, HER2+) - Signed by Martinez Parrish MD on 2021      Treatment Area: Brain    Current Total Dose(cGy): 2400  Current Fraction: 8/10  Final/Cumulative Rx. Dose (cGy): 3000    Patient was seen today for weekly visit. Wt Readings from Last 3 Encounters:   22 134 lb (60.8 kg)   22 134 lb 14.4 oz (61.2 kg)   22 130 lb 1.1 oz (59 kg)       BP (!) 100/49   Pulse 74   Temp 98 °F (36.7 °C) (Infrared)   Resp 16   Wt 134 lb (60.8 kg)   LMP  (LMP Unknown) Comment: age 52 ovaries intact  SpO2 92%   BMI 26.17 kg/m²     Lab Results   Component Value Date    WBC 8.9 2022    PLT 61 (L) 2022       Comfort Alteration  Fatigue:Must curtail daily activities even with rest periods and early bedtime    Pain Location: Back  Pain Intensity (Current): 10 Worst possible pain  Pain Treatment:  Norco given at hospital, pt states she was unaware.   Pain Relief: n/a    Emotional Alteration:   Coping: effective    Nutritional Alteration  Anorexia: none   Nausea: 1-2 episodes of nausea in 24 hours  Vomiting: No vomiting   Salivary Glands- Acute: No changes over baseline  Taste Disturbance (Dysgeusia): Normal   Dyspepsia/Heartburn: None  Dysphagia/Esophagitis: None    Skin Alteration   Skin reaction: No changes noted  Alopecia: No loss    Mucous Membrane Alteration  Mucositis XRT Related: None  Pharynx and Esophagus- Acute: No change over baseline  Voice Changes: Normal    Ventilation Alteration  Cough: None  Hemoptysis: None  Dyspnea: Normal  Mucous Quantity/Quality:     CNS Alteration  Level of Consciousness: Alert, responds briskly, appropriately with minimal stimulus  Seizure Activity: None  Insomnia: Normal   Orientation: Oriented in 3 spheres of person, place, and time  Comment:   Speech Impairment: No  Ataxia: Normal    Additional Comments: Pt appears confused today, states she wasn't given anything to eat today, but then states she was nauseous and tried to eat. MEDICATIONS:     Current Outpatient Medications   Medication Sig Dispense Refill    HYDROcodone-acetaminophen (NORCO) 5-325 MG per tablet Take 1 tablet by mouth every 6 hours as needed for Pain for up to 15 days.  60 tablet 0    predniSONE (DELTASONE) 1 MG tablet Take 1 mg by mouth daily      ALPRAZolam (XANAX) 0.25 MG tablet Take 0.25 mg by mouth 2 times daily as needed for Sleep.      vitamin C (ASCORBIC ACID) 500 MG tablet Take 1,000 mg by mouth daily      metoprolol tartrate (LOPRESSOR) 25 MG tablet Take 0.5 tablets by mouth 2 times daily 60 tablet 3    amiodarone (CORDARONE) 200 MG tablet take 1 tablet by mouth every morning 30 tablet 3    sertraline (ZOLOFT) 50 MG tablet take 1 tablet by mouth twice a day 180 tablet 1    midodrine (PROAMATINE) 2.5 MG tablet Take 1 tablet by mouth 3 times daily 90 tablet 3    Calcium Carbonate-Vitamin D (OYSTER SHELL CALCIUM/D) 500-200 MG-UNIT TABS take 1 tablet by mouth twice a day 180 tablet 3    RA BIOTIN 1000 MCG TABS Take 1,000 mcg by mouth 2 times daily 60 tablet 5    vitamin D (ERGOCALCIFEROL) 1.25 MG (89064 UT) CAPS capsule take 1 capsule by mouth every week 5 capsule 5    levothyroxine (SYNTHROID) 100 MCG tablet take 1 tablet by mouth once daily 90 tablet 0    isosorbide mononitrate (IMDUR) 30 MG extended release tablet take 1 tablet by mouth once daily 90 tablet 1    Nebulizers (COMPRESSOR/NEBULIZER) MISC 1 Device by Does not apply route 4 times daily as needed (Shortness of breath and wheezing) 1 each 3    albuterol (PROVENTIL) (2.5 MG/3ML) 0.083% nebulizer solution Take 3 mLs by nebulization every 6 hours as needed for Wheezing 120 each 3    pantoprazole (PROTONIX) 40 MG tablet take 1 tablet by mouth once daily 90 tablet 1    albuterol sulfate  (90 Base) MCG/ACT inhaler Inhale 2 puffs into the lungs every 6 hours as needed for Wheezing or Shortness of Breath 18 g 3    levETIRAcetam (KEPPRA) 500 MG tablet take 1 tablet by mouth twice a day 240 tablet 2    loratadine (CLARITIN) 10 MG tablet take 1 tablet by mouth once daily 90 tablet 1    REFRESH PLUS 0.5 % SOLN ophthalmic solution use as directed      melatonin 3 MG TABS tablet Take 1 tablet by mouth nightly as needed (insomnia) 30 tablet 3    Nutritional Supplements (ENSURE ORIGINAL) LIQD Take 1 Can by mouth 2 times daily (Patient not taking: Reported on 2022) 60 Bottle 5    Multiple Vitamins-Minerals (CENTRUM SILVER ADULT 50+) TABS Take 1 tablet by mouth daily 90 tablet 1     No current facility-administered medications for this encounter. PHYSICAL EXAM:       ECO - Asymptomatic (Fully active, able to carry on all pre-disease activities without restriction)    General:  NAD, AxO x 3  HEENT:  no significant erythema; skin otherwise intact   Thorax:  Unlabored  Abdomen:  Non-distended    Chemotherapy Update: None    Treatment Imaging: Kv Pair, Port Film, and All imaging reviewed and approved by Dr. Lynn Lin: No significant radiation side effects. Responding appropriately to symptomatic management.  On further discussion, patient states back pain has occurred about once per week after getting off treatment table, but gets better once she goes home    New medications, diagnostic results: Not applicable    PLAN: Again reviewed potential side effects of radiation for the patient's treatment. Per complaint of back pain after lying on treatment table, will seek to provide more back support on table   Continue local/topical care. Continue current radiation course as prescribed.

## 2022-08-25 NOTE — TELEPHONE ENCOUNTER
Date of last visit:  8/9/2022  Date of next visit:  11/10/2022    Requested Prescriptions     Pending Prescriptions Disp Refills    HYDROcodone-acetaminophen (NORCO) 5-325 MG per tablet 28 tablet 0     Sig: Take 1 tablet by mouth every 6 hours as needed for Pain for up to 7 days. Maxcine Fraction called and stated patient patient is having a really Bad HA and requesting [place] to [facility] on [address]    Please call Gilda Bean #: [phone] after its send over.

## 2022-08-25 NOTE — TELEPHONE ENCOUNTER
The pharmacy is requesting a refill of the below medication which has been pended for you:     Requested Prescriptions     Pending Prescriptions Disp Refills    ALPRAZolam (XANAX) 0.25 MG tablet [Pharmacy Med Name: ALPRAZOLAM 0.25 MG TABLET] 60 tablet      Sig: take 1 tablet by mouth twice a day if needed for anxiety       Last Appointment Date: 8/9/2022  Next Appointment Date: 11/10/2022    Allergies   Allergen Reactions    Bactrim [Sulfamethoxazole-Trimethoprim] Swelling     Of tongue    Demerol Rash     nausea    Pcn [Penicillins] Rash

## 2022-08-25 NOTE — CARE COORDINATION
[facility] Transitions Initial Follow Up Call    Call within 2 business days of discharge: Yes    Patient: Levi Louis Patient : 1933   MRN: [id number]  Reason for Admission: General weakness  Discharge Date: 22 RARS: Readmission Risk Score: 27.5      Last Discharge [facility]       Date Complaint Diagnosis Description Type Department Provider    22 Fatigue General weakness . .. ED to Hosp-Admission (Discharged) (ADMITTED) JOSE MIGUEL 5K Carlyn Dewitt MD; Gilda Hobbs. .. Spoke with stanley, Darryl Guillaume, said that Mahlon Shone is still pretty weak, resting now. Did complain of bad headache today. Said the hospital was supposed to give a script for pain meds. She wants Norco because Tylenol #3 makes her sick to her stomach. Her  is going back to [place] in about half hour and he would be the one to  if called in. Pharmacy can not deliver controlled substance. Suggested she call PCP herself since he may not see my message in time, said she would also schedule her f/u appt. Has transportation thru [employer]. Reviewed medications/changes. Is aware that several meds were stopped. Said the [facility] nurse sets up her meds. Mahlon Shone has some open areas around her perineum and coccyx, Darryl Guillaume said looks pretty bad, she washed her up and covered them with zinc oxide. Continued care HH has called and the nurse will be coming out. Eating and drinking ok. Denies other needs. Agreed for ACM to continue to follow. No other questions or concerns at this time.       Non-face-to-face services provided:  Scheduled appointment with PCP-stanley calling  Scheduled appointment with Specialist-  Obtained and reviewed discharge summary and/or continuity of care documents    Care Transitions 24 Hour Call    Do you have a copy of your discharge instructions?: Yes  Do you have all of your prescriptions and are they filled?: Yes  Have you been contacted by a [id number] [employer] Pharmacist?: No  Have you scheduled your follow up appointment?: No  Post Acute Services: Home Health (Comment: Continued Care)  Patient DME: Nathan Newsome, Wheelchair  Do you have support at home?: Alone  Do you feel like you have everything you need to keep you well at home?: Yes  Are you an active caregiver in your home?: No  Care Transitions Interventions     Transportation Support: Completed        Transitions of Care Initial Call    Challenges to be reviewed by the provider   Additional needs identified to be addressed with provider: yes  Needs 7 day hospital f/u             Method of communication with provider : chart message    Advance Care Planning:   Does patient have an Advance Directive: reviewed and current. Care Transition Nurse contacted the family by telephone to perform post hospital discharge assessment. Verified name and  with family as identifiers. Provided introduction to self, and explanation of the CTN role. CTN reviewed discharge instructions, medical action plan and red flags with family who verbalized understanding. Family given an opportunity to ask questions and does not have any further questions or concerns at this time. Were discharge instructions available to patient? Yes. Reviewed appropriate site of care based on symptoms and resources available to patient including: PCP  Specialist  Home health. The family agrees to contact the PCP office for questions related to their healthcare. Medication reconciliation was performed with family, who verbalizes understanding of administration of home medications. Advised obtaining a 90-day supply of all daily and as-needed medications. Was patient discharged with a pulse oximeter? no    CTN provided contact information. No further follow-up call indicated based on severity of symptoms and risk factors.       Follow Up  Future Appointments   Date Time Provider Irene Shah   2022  2:45 PM Zena Chaney   2022  2:45 PM SCHEDULE, Compa Bashir LIN[facility] [address] HOD   11/10/2022  3:10 PM Jeanette Bowles MD [facility]       Lynda Vazquez, [address] [place] [address]

## 2022-08-25 NOTE — PLAN OF CARE
Problem: Skin/Tissue Integrity  Goal: Absence of new skin breakdown  Description: 1. Monitor for areas of redness and/or skin breakdown  2. Assess vascular access sites hourly  3. Every 4-6 hours minimum:  Change oxygen saturation probe site  4. Every 4-6 hours:  If on nasal continuous positive airway pressure, respiratory therapy assess nares and determine need for appliance change or resting period. 8/25/2022 0117 by Bonnie Garcia RN  Outcome: Progressing  Note: Ongoing assessment & interventions provided throughout shift. Skin assessments provided. Encouraged patient to turn as needed.        Problem: ABCDS Injury Assessment  Goal: Absence of physical injury  8/25/2022 0117 by Bonnie Garcia RN  Outcome: Progressing  Flowsheets (Taken 8/25/2022 0117)  Absence of Physical Injury: Implement safety measures based on patient assessment     Problem: Safety - Adult  Goal: Free from fall injury  8/25/2022 0117 by Bonnie Garcia RN  Outcome: Progressing  Flowsheets (Taken 8/25/2022 0117)  Free From Fall Injury: Instruct family/caregiver on patient safety     Problem: Pain  Goal: Verbalizes/displays adequate comfort level or baseline comfort level  8/25/2022 0117 by Bonnie Garcia RN  Outcome: Progressing  Flowsheets (Taken 8/25/2022 0117)  Verbalizes/displays adequate comfort level or baseline comfort level:   Encourage patient to monitor pain and request assistance   Assess pain using appropriate pain scale     Problem: Discharge Planning  Goal: Discharge to home or other facility with appropriate resources  8/25/2022 0117 by Bonnie Garcia RN  Outcome: Progressing  Flowsheets  Taken 8/25/2022 0117  Discharge to home or other facility with appropriate resources:   Identify barriers to discharge with patient and caregiver   Arrange for needed discharge resources and transportation as appropriate  Taken 8/24/2022 2045  Discharge to home or other facility with appropriate resources: Identify barriers to discharge with patient and caregiver     Problem: Nutrition Deficit:  Goal: Optimize nutritional status  8/25/2022 0117 by Julio Roman RN  Outcome: Progressing  Flowsheets (Taken 8/25/2022 0117)  Nutrient intake appropriate for improving, restoring, or maintaining nutritional needs: Assess nutritional status and recommend course of action     Care plan reviewed with patient. Patient verbalizes understanding of the care plan and contributed to goal setting.

## 2022-08-25 NOTE — PLAN OF CARE
Problem: Skin/Tissue Integrity  Goal: Absence of new skin breakdown  Description: 1. Monitor for areas of redness and/or skin breakdown  2. Assess vascular access sites hourly  3. Every 4-6 hours minimum:  Change oxygen saturation probe site  4. Every 4-6 hours:  If on nasal continuous positive airway pressure, respiratory therapy assess nares and determine need for appliance change or resting period. 8/25/2022 0200 by Christiano Laboy RN  Outcome: Completed     Problem: ABCDS Injury Assessment  Goal: Absence of physical injury  8/25/2022 0200 by Christiano Laboy RN  Outcome: Completed     Problem: Safety - Adult  Goal: Free from fall injury  8/25/2022 0200 by Christiano Laboy RN  Outcome: Completed     Problem: Pain  Goal: Verbalizes/displays adequate comfort level or baseline comfort level  8/25/2022 0200 by Christiano Laboy RN  Outcome: Completed     Problem: Discharge Planning  Goal: Discharge to home or other facility with appropriate resources  8/25/2022 0200 by Christiano Laboy RN  Outcome: Completed     Problem: Nutrition Deficit:  Goal: Optimize nutritional status  8/25/2022 0200 by Christiano Laboy RN  Outcome: Completed     Care plan reviewed with patient. Patient verbalizes understanding of the care plan and contributed to goal setting.

## 2022-08-26 ENCOUNTER — HOSPITAL ENCOUNTER (OUTPATIENT)
Dept: RADIATION ONCOLOGY | Age: 87
Discharge: HOME | End: 2022-08-26
Payer: MEDICARE

## 2022-08-26 PROCEDURE — 77387 GUIDANCE FOR RADJ TX DLVR: CPT | Performed by: RADIOLOGY

## 2022-08-26 PROCEDURE — 77412 RADIATION TX DELIVERY LVL 3: CPT | Performed by: RADIOLOGY

## 2022-08-26 PROCEDURE — G6002 STEREOSCOPIC X-RAY GUIDANCE: HCPCS | Performed by: RADIOLOGY

## 2022-08-27 RX ORDER — ALPRAZOLAM 0.25 MG/1
TABLET ORAL
Qty: 60 TABLET | Refills: 0 | Status: SHIPPED | OUTPATIENT
Start: 2022-08-27 | End: 2022-09-26

## 2022-08-28 NOTE — CONSULTS
Brief Note  Radiation Oncology    Ray Bryant is a 80 y.o. female who is well known to our service and currently receiving treatment for her intracranial metastatic disease (See notes from 07/26/22 and 08/20/22 for further information). She has tolerated treatment well so far, however she does note intermittent low back pain following treatment, likely related to discomfort lying on the firm treatment table. She states that this resolves later in the day following treatment. She is instructed to take tylenol prior to treatment to help with her discomfort. She is otherwise well with no other general complaints. We will continue with palliative radiation therapy and will follow closely once treatment is completed.     Darinel Israel MD MS  Radiation Oncology

## 2022-08-29 ENCOUNTER — HOSPITAL ENCOUNTER (OUTPATIENT)
Dept: RADIATION ONCOLOGY | Age: 87
Discharge: HOME | End: 2022-08-29
Payer: MEDICARE

## 2022-08-29 ENCOUNTER — CARE COORDINATION (OUTPATIENT)
Dept: CARE COORDINATION | Age: 87
End: 2022-08-29

## 2022-08-29 PROCEDURE — 77412 RADIATION TX DELIVERY LVL 3: CPT | Performed by: RADIOLOGY

## 2022-08-29 PROCEDURE — 77387 GUIDANCE FOR RADJ TX DLVR: CPT | Performed by: RADIOLOGY

## 2022-08-29 PROCEDURE — G6002 STEREOSCOPIC X-RAY GUIDANCE: HCPCS | Performed by: RADIOLOGY

## 2022-08-29 PROCEDURE — 77427 RADIATION TX MANAGEMENT X5: CPT | Performed by: RADIOLOGY

## 2022-08-29 NOTE — CARE COORDINATION
Ambulatory Care Coordination Note  8/29/2022    ACC: Erin Carr RN    Summary Note: Spoke with Celeste, caregiver on HIPPA. States Robbin Sharma is having good days and bad  States some days she has a headache and other days she does not  Taking Norco for pain which is effective  Discussed some programs offered during last call. She has declined palliative care program through [facility] and palliative care. The referral had been placed on 8-15-22 but she declines to have this support  Discussed recent hospitalization  Has follow up appt scheduled for chemo tx as well as PCP appt scheduled for this week.   Confirmed date and time of these appts during call  [facility] continues to be in place    Plan  Continue to offer additional support services as needed/allowed  Collaborate with [facility] as needed  Reinforce fall education and prevention to reduce risk for injury related to falls  Reinforce importance of early symptom recognition and reporting to prevent exacerbation and unnecessary hospitalization  General Assessment    Do you have any symptoms that are causing concern?: Yes  Progression since Onset: Unchanged, Intermittent - Waxing/Waning  Reported Symptoms: Other (Comment: headache/ brain cancer)           Lab Results       None            Care Coordination Interventions    Referral from Primary Care Provider: No  Suggested Interventions and [address] [place] [address] Hwy: Completed  Medication Assistance Program: Not Started  Medi Set or Pill Pack: Completed (Comment: set up by [facility] nurse weekly)  Occupational Therapy: Not Started  Palliative Care: Declined (Comment: referral was placed 8-15-22 and now declined enrollment 8/29/22)  Physical Therapy: Not Started  Senior Services: Completed (Comment: COA/also discussed referral to [facility])  Social Work: Not Started  Transportation Support: Completed  Other Services or Interventions: referral placed with [facility] 8/15/22- has declined all support. continues to have [facility] in place          Goals Addressed                      This Visit's Progress      Conditions and Symptoms (pt-stated)   On track      I will schedule office visits, as directed by my provider. I will keep my appointment or reschedule if I have to cancel. I will notify my provider of any barriers to my plan of care. I will notify my provider of any symptoms that indicate a worsening of my condition. Barriers: need for additional support and education  Plan for overcoming my barriers: need for additional support and education  Confidence: 9/10  Anticipated Goal Completion Date: 10/8/22          Reduce Falls  (pt-stated)   On track      I will reduce my risk of falls by the following: Use walking aids like cane or walker    Barriers: impairment:  cognitive  Plan for overcoming my barriers: family and HH, ACM support  Confidence: 7/10  Anticipated Goal Completion Date: 10/8/22              Prior to Admission medications    Medication Sig Start Date End Date Taking? Authorizing Provider   ALPRAZolam Nilda Hughes) 0.25 MG tablet take 1 tablet by mouth twice a day if needed for anxiety 8/27/22 9/26/22  Hawa Junior MD   HYDROcodone-acetaminophen [facility]) 5-325 MG per tablet Take 1 tablet by mouth every 6 hours as needed for Pain for up to 15 days.  8/25/22 9/9/22  Hawa Junior MD   predniSONE (DELTASONE) 1 MG tablet Take 1 mg by mouth daily    Historical Provider, MD   vitamin C (ASCORBIC ACID) 500 MG tablet Take 1,000 mg by mouth daily    Historical Provider, MD   metoprolol tartrate (LOPRESSOR) 25 MG tablet Take 0.5 tablets by mouth 2 times daily 8/19/22   Hawa Junior MD   amiodarone (CORDARONE) 200 MG tablet take 1 tablet by mouth every morning 8/16/22   Hawa Junior MD   sertraline (ZOLOFT) 50 MG tablet take 1 tablet by mouth twice a day 8/9/22   Hawa Junior MD   dexamethasone (DECADRON) 4 MG tablet Take 1 tablet by mouth in the morning and 1 tablet at noon and 1 tablet in the evening and 1 tablet before bedtime. Do all this for 20 days.   Patient not taking: Reported on 8/21/2022 8/1/22 8/24/22  Dami Powers MD   midodrine (PROAMATINE) 2.5 MG tablet Take 1 tablet by mouth 3 times daily 7/6/22   Nick Mcdonald MD   Calcium Carbonate-Vitamin D (OYSTER SHELL CALCIUM/D) 500-200 MG-UNIT TABS take 1 tablet by mouth twice a day 6/28/22   Hawa Junior MD   RA BIOTIN 1000 MCG TABS Take 1,000 mcg by mouth 2 times daily 6/21/22   Shirley Uribe MD   vitamin D (ERGOCALCIFEROL) 1.25 MG (15721 UT) CAPS capsule take 1 capsule by mouth every week 6/21/22   Shirley Uribe MD   levothyroxine (SYNTHROID) 100 MCG tablet take 1 tablet by mouth once daily 5/4/22   Hawa Junior MD   isosorbide mononitrate (IMDUR) 30 MG extended release tablet take 1 tablet by mouth once daily 4/1/22   Hawa Junior MD   simvastatin (ZOCOR) 20 MG tablet take 1 tablet by mouth every evening  Patient not taking: Reported on 8/21/2022 3/28/22 8/24/22  Hawa Junior MD   Nebulizers (COMPRESSOR/NEBULIZER) MISC 1 Device by Does not apply route 4 times daily as needed (Shortness of breath and wheezing) 3/3/22   Hawa Junior MD   albuterol (PROVENTIL) (2.5 MG/3ML) 0.083% nebulizer solution Take 3 mLs by nebulization every 6 hours as needed for Wheezing 2/28/22   Mayra Pratt MD   pantoprazole (PROTONIX) 40 MG tablet take 1 tablet by mouth once daily 2/10/22   Hawa Junior MD   zinc sulfate (ZINCATE) 220 (50 Zn) MG capsule Take 1 capsule by mouth daily  Patient not taking: Reported on 8/21/2022 1/28/22 8/24/22  Mayra Pratt MD   albuterol sulfate  (90 Base) MCG/ACT inhaler Inhale 2 puffs into the lungs every 6 hours as needed for Wheezing or Shortness of Breath 1/25/22   Adrianne Chauhan MD   levETIRAcetam (KEPPRA) 500 MG tablet take 1 tablet by mouth twice a day 11/16/21   Joby Díaz MD   loratadine (CLARITIN) 10 MG tablet take 1 tablet by mouth once daily 11/16/21   Gee Huggins MD   REFRESH PLUS 0.5 % SOLN ophthalmic solution use as directed 9/9/21   Historical Provider, MD   traZODone (DESYREL) 50 MG tablet take 1 tablet by mouth at bedtime if needed for sleep  Patient not taking: No sig reported 8/16/21 8/1/22  Gee Huggins MD   melatonin 3 MG TABS tablet Take 1 tablet by mouth nightly as needed (insomnia) 7/20/21   Adolfo Glez MD   Nutritional Supplements (ENSURE ORIGINAL) LIQD Take 1 Can by mouth 2 times daily  Patient not taking: Reported on 8/21/2022 5/13/21   Gee Huggins MD   Multiple Vitamins-Minerals (CENTRUM SILVER ADULT 50+) TABS Take 1 tablet by mouth daily 12/7/15   Sarita Drew MD       Future Appointments   Date Time Provider Irene Shah   8/29/2022  2:45 PM [address] [facility]   9/1/2022  3:40 PM Gee Huggins MD [facility] [address]   11/10/2022  3:10 PM Gee Huggins MD [facility]

## 2022-08-29 NOTE — DISCHARGE INSTRUCTIONS
PATIENT DISCHARGE INSTRUCTIONS    Remember that side effects present at the end of your treatments will improve within a few weeks after the last treatment. Eat well balanced meals even though your treatments are finished. This will help speed the healing process. Continue any special diets prescribed to control side effects until these side effects have been resolved. Get plenty of rest.  If you have experienced fatigue and/or weakness, this may continue for several weeks after your last treatment. Continue with your daily activities according to the way you feel. Continue to be gentle with your skin. Follow your present skin care instructions until your follow-up visit. IF YOU DEVELOP ANY CHANGES IN YOUR SKIN IN THE AREA TREATED WITH RADIATION, PLEASE CALL THE RADIATION ONCOLOGY NURSE -[phone]. Protect your skin from any injury and avoid direct sun exposure in the treatment area. The skin in the treated area may always be more sensitive than the rest of your skin. Always use SPF 27 or higher sun block if you will be in the sun and cannot avoid exposure. Please contact your referring physician for a follow-up appointment in addition to your Radiation Oncology appointment.   Presence of pain: No  Medication Taper: No    See Instructions Dated: n/a  Follow up orders: n/a

## 2022-08-30 ENCOUNTER — APPOINTMENT (OUTPATIENT)
Dept: GENERAL RADIOLOGY | Age: 87
DRG: 177 | End: 2022-08-30
Payer: MEDICARE

## 2022-08-30 ENCOUNTER — APPOINTMENT (OUTPATIENT)
Dept: CT IMAGING | Age: 87
DRG: 177 | End: 2022-08-30
Payer: MEDICARE

## 2022-08-30 ENCOUNTER — HOSPITAL ENCOUNTER (INPATIENT)
Age: 87
LOS: 9 days | Discharge: NURSING, REHAB OR LONG-TERM CARE | DRG: 177 | End: 2022-09-08
Attending: EMERGENCY MEDICINE | Admitting: FAMILY MEDICINE
Payer: MEDICARE

## 2022-08-30 DIAGNOSIS — I26.99 ACUTE PULMONARY EMBOLISM WITHOUT ACUTE COR PULMONALE, UNSPECIFIED PULMONARY EMBOLISM TYPE (HCC): Primary | ICD-10-CM

## 2022-08-30 DIAGNOSIS — R60.9 SWELLING: ICD-10-CM

## 2022-08-30 DIAGNOSIS — I82.403 ACUTE THROMBOEMBOLISM OF DEEP VEINS OF BOTH LOWER EXTREMITIES (HCC): ICD-10-CM

## 2022-08-30 PROBLEM — D69.6 THROMBOCYTOPENIA (HCC): Status: RESOLVED | Noted: 2022-01-01 | Resolved: 2022-01-01

## 2022-08-30 PROBLEM — I48.20 CHRONIC ATRIAL FIBRILLATION (HCC): Status: RESOLVED | Noted: 2022-01-01 | Resolved: 2022-01-01
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
## 2022-08-30 PROBLEM — J18.9 PNEUMONIA: Status: ACTIVE | Noted: 2022-01-01

## 2022-08-30 PROBLEM — C79.31 BREAST CANCER METASTASIZED TO BRAIN, UNSPECIFIED LATERALITY (HCC): Status: RESOLVED | Noted: 2022-01-01 | Resolved: 2022-01-01

## 2022-08-30 PROBLEM — Z79.52 CURRENT CHRONIC USE OF SYSTEMIC STEROIDS: Status: RESOLVED | Noted: 2018-09-04 | Resolved: 2022-01-01

## 2022-08-30 PROBLEM — T81.31XA SURGICAL WOUND DEHISCENCE: Status: RESOLVED | Noted: 2022-06-20 | Resolved: 2022-08-30

## 2022-08-30 PROBLEM — T81.89XA NONHEALING SURGICAL WOUND: Status: RESOLVED | Noted: 2022-01-01 | Resolved: 2022-01-01
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
## 2022-08-30 PROBLEM — M48.07 LUMBOSACRAL SPINAL STENOSIS: Status: RESOLVED | Noted: 2019-05-01 | Resolved: 2022-01-01

## 2022-08-30 PROBLEM — Z86.73 H/O: CVA (CEREBROVASCULAR ACCIDENT): Status: RESOLVED | Noted: 2017-11-06 | Resolved: 2022-01-01

## 2022-08-30 PROBLEM — R79.89 AZOTEMIA: Status: RESOLVED | Noted: 2022-01-01 | Resolved: 2022-01-01

## 2022-08-30 PROBLEM — C50.919 METASTATIC BREAST CANCER (HCC): Status: RESOLVED | Noted: 2021-07-06 | Resolved: 2022-01-01

## 2022-08-30 PROBLEM — N30.00 ACUTE CYSTITIS WITHOUT HEMATURIA: Status: RESOLVED | Noted: 2021-07-15 | Resolved: 2022-01-01

## 2022-08-30 PROBLEM — C50.919 BREAST CANCER METASTASIZED TO BRAIN, UNSPECIFIED LATERALITY (HCC): Status: RESOLVED | Noted: 2022-01-01 | Resolved: 2022-01-01

## 2022-08-30 LAB
ALBUMIN SERPL-MCNC: 2.4 G/DL (ref 3.5–5.1)
ALLEN TEST: POSITIVE
ALP BLD-CCNC: 65 U/L (ref 38–126)
ALT SERPL-CCNC: 34 U/L (ref 11–66)
ANION GAP SERPL CALCULATED.3IONS-SCNC: 13 MEQ/L (ref 8–16)
AST SERPL-CCNC: 42 U/L (ref 5–40)
BASE EXCESS (CALCULATED): 0.4 MMOL/L (ref -2.5–2.5)
BILIRUB SERPL-MCNC: 0.3 MG/DL (ref 0.3–1.2)
BILIRUBIN DIRECT: < 0.2 MG/DL (ref 0–0.3)
BUN BLDV-MCNC: 33 MG/DL (ref 7–22)
C-REACTIVE PROTEIN: 27.95 MG/DL (ref 0–1)
CALCIUM SERPL-MCNC: 10.2 MG/DL (ref 8.5–10.5)
CHLORIDE BLD-SCNC: 98 MEQ/L (ref 98–111)
CO2: 22 MEQ/L (ref 23–33)
COLLECTED BY:: NORMAL
CREAT SERPL-MCNC: 0.8 MG/DL (ref 0.4–1.2)
DEVICE: NORMAL
EKG ATRIAL RATE: 74 BPM
EKG P AXIS: 21 DEGREES
EKG P-R INTERVAL: 166 MS
EKG Q-T INTERVAL: 404 MS
EKG QRS DURATION: 78 MS
EKG QTC CALCULATION (BAZETT): 448 MS
EKG R AXIS: -15 DEGREES
EKG T AXIS: 170 DEGREES
EKG VENTRICULAR RATE: 74 BPM
FLU A ANTIGEN: NEGATIVE
FLU B ANTIGEN: NEGATIVE
GFR SERPL CREATININE-BSD FRML MDRD: 67 ML/MIN/1.73M2
GLUCOSE BLD-MCNC: 174 MG/DL (ref 70–108)
HCO3: 25 MMOL/L (ref 23–28)
IFIO2: 4
INR BLD: 1.14 (ref 0.85–1.13)
LACTIC ACID, SEPSIS: 1.1 MMOL/L (ref 0.5–1.9)
LACTIC ACID, SEPSIS: 2 MMOL/L (ref 0.5–1.9)
LIPASE: 8.1 U/L (ref 5.6–51.3)
O2 SATURATION: 96 %
OSMOLALITY CALCULATION: 277.8 MOSMOL/KG (ref 275–300)
PCO2: 42 MMHG (ref 35–45)
PH BLOOD GAS: 7.39 (ref 7.35–7.45)
PO2: 80 MMHG (ref 71–104)
POTASSIUM REFLEX MAGNESIUM: 4.6 MEQ/L (ref 3.5–5.2)
PRO-BNP: 5499 PG/ML (ref 0–1800)
PROCALCITONIN: 0.31 NG/ML (ref 0.01–0.09)
REASON FOR REJECTION: NORMAL
REJECTED TEST: NORMAL
SARS-COV-2, NAAT: NOT  DETECTED
SCAN OF BLOOD SMEAR: NORMAL
SODIUM BLD-SCNC: 133 MEQ/L (ref 135–145)
SOURCE, BLOOD GAS: NORMAL
TOTAL PROTEIN: 5.2 G/DL (ref 6.1–8)
TROPONIN T: < 0.01 NG/ML

## 2022-08-30 PROCEDURE — 82803 BLOOD GASES ANY COMBINATION: CPT

## 2022-08-30 PROCEDURE — 80076 HEPATIC FUNCTION PANEL: CPT

## 2022-08-30 PROCEDURE — 84145 PROCALCITONIN (PCT): CPT

## 2022-08-30 PROCEDURE — 2580000003 HC RX 258: Performed by: FAMILY MEDICINE

## 2022-08-30 PROCEDURE — 71045 X-RAY EXAM CHEST 1 VIEW: CPT

## 2022-08-30 PROCEDURE — 80048 BASIC METABOLIC PNL TOTAL CA: CPT

## 2022-08-30 PROCEDURE — 2060000000 HC ICU INTERMEDIATE R&B

## 2022-08-30 PROCEDURE — 70450 CT HEAD/BRAIN W/O DYE: CPT

## 2022-08-30 PROCEDURE — 6370000000 HC RX 637 (ALT 250 FOR IP): Performed by: FAMILY MEDICINE

## 2022-08-30 PROCEDURE — 93005 ELECTROCARDIOGRAM TRACING: CPT | Performed by: EMERGENCY MEDICINE

## 2022-08-30 PROCEDURE — 2580000003 HC RX 258: Performed by: EMERGENCY MEDICINE

## 2022-08-30 PROCEDURE — 83880 ASSAY OF NATRIURETIC PEPTIDE: CPT

## 2022-08-30 PROCEDURE — 86140 C-REACTIVE PROTEIN: CPT

## 2022-08-30 PROCEDURE — 87040 BLOOD CULTURE FOR BACTERIA: CPT

## 2022-08-30 PROCEDURE — 36600 WITHDRAWAL OF ARTERIAL BLOOD: CPT

## 2022-08-30 PROCEDURE — 83605 ASSAY OF LACTIC ACID: CPT

## 2022-08-30 PROCEDURE — 87635 SARS-COV-2 COVID-19 AMP PRB: CPT

## 2022-08-30 PROCEDURE — 85025 COMPLETE CBC W/AUTO DIFF WBC: CPT

## 2022-08-30 PROCEDURE — 84484 ASSAY OF TROPONIN QUANT: CPT

## 2022-08-30 PROCEDURE — 87804 INFLUENZA ASSAY W/OPTIC: CPT

## 2022-08-30 PROCEDURE — 6360000002 HC RX W HCPCS: Performed by: EMERGENCY MEDICINE

## 2022-08-30 PROCEDURE — 36415 COLL VENOUS BLD VENIPUNCTURE: CPT

## 2022-08-30 PROCEDURE — 99285 EMERGENCY DEPT VISIT HI MDM: CPT

## 2022-08-30 PROCEDURE — 83690 ASSAY OF LIPASE: CPT

## 2022-08-30 PROCEDURE — 77336 RADIATION PHYSICS CONSULT: CPT | Performed by: RADIOLOGY

## 2022-08-30 PROCEDURE — 6360000002 HC RX W HCPCS: Performed by: FAMILY MEDICINE

## 2022-08-30 PROCEDURE — 85610 PROTHROMBIN TIME: CPT

## 2022-08-30 PROCEDURE — 93010 ELECTROCARDIOGRAM REPORT: CPT | Performed by: INTERNAL MEDICINE

## 2022-08-30 RX ORDER — METRONIDAZOLE 500 MG/100ML
500 INJECTION, SOLUTION INTRAVENOUS ONCE
Status: COMPLETED | OUTPATIENT
Start: 2022-08-30 | End: 2022-08-31

## 2022-08-30 RX ORDER — POTASSIUM CHLORIDE 7.45 MG/ML
10 INJECTION INTRAVENOUS PRN
Status: DISCONTINUED | OUTPATIENT
Start: 2022-08-30 | End: 2022-09-08 | Stop reason: HOSPADM

## 2022-08-30 RX ORDER — 0.9 % SODIUM CHLORIDE 0.9 %
1000 INTRAVENOUS SOLUTION INTRAVENOUS ONCE
Status: COMPLETED | OUTPATIENT
Start: 2022-08-30 | End: 2022-08-30

## 2022-08-30 RX ORDER — ACETAMINOPHEN 650 MG/1
650 SUPPOSITORY RECTAL EVERY 6 HOURS PRN
Status: DISCONTINUED | OUTPATIENT
Start: 2022-08-30 | End: 2022-09-08 | Stop reason: HOSPADM

## 2022-08-30 RX ORDER — ONDANSETRON 2 MG/ML
4 INJECTION INTRAMUSCULAR; INTRAVENOUS EVERY 6 HOURS PRN
Status: DISCONTINUED | OUTPATIENT
Start: 2022-08-30 | End: 2022-09-08 | Stop reason: HOSPADM

## 2022-08-30 RX ORDER — ONDANSETRON 4 MG/1
4 TABLET, ORALLY DISINTEGRATING ORAL EVERY 8 HOURS PRN
Status: DISCONTINUED | OUTPATIENT
Start: 2022-08-30 | End: 2022-09-08 | Stop reason: HOSPADM

## 2022-08-30 RX ORDER — ISOSORBIDE MONONITRATE 30 MG/1
30 TABLET, EXTENDED RELEASE ORAL DAILY
Status: DISCONTINUED | OUTPATIENT
Start: 2022-08-30 | End: 2022-09-08 | Stop reason: HOSPADM

## 2022-08-30 RX ORDER — AMIODARONE HYDROCHLORIDE 200 MG/1
200 TABLET ORAL DAILY
Status: DISCONTINUED | OUTPATIENT
Start: 2022-08-30 | End: 2022-09-08 | Stop reason: HOSPADM

## 2022-08-30 RX ORDER — ACETAMINOPHEN 325 MG/1
650 TABLET ORAL EVERY 6 HOURS PRN
Status: DISCONTINUED | OUTPATIENT
Start: 2022-08-30 | End: 2022-09-08 | Stop reason: HOSPADM

## 2022-08-30 RX ORDER — ALPRAZOLAM 0.25 MG/1
0.25 TABLET ORAL 2 TIMES DAILY PRN
Status: DISCONTINUED | OUTPATIENT
Start: 2022-08-30 | End: 2022-09-08 | Stop reason: HOSPADM

## 2022-08-30 RX ORDER — POLYVINYL ALCOHOL 14 MG/ML
2 SOLUTION/ DROPS OPHTHALMIC 3 TIMES DAILY
Status: DISCONTINUED | OUTPATIENT
Start: 2022-08-30 | End: 2022-09-08 | Stop reason: HOSPADM

## 2022-08-30 RX ORDER — 0.9 % SODIUM CHLORIDE 0.9 %
1000 INTRAVENOUS SOLUTION INTRAVENOUS ONCE
Status: DISCONTINUED | OUTPATIENT
Start: 2022-08-30 | End: 2022-08-31 | Stop reason: HOSPADM

## 2022-08-30 RX ORDER — SODIUM CHLORIDE 9 MG/ML
INJECTION, SOLUTION INTRAVENOUS CONTINUOUS
Status: DISCONTINUED | OUTPATIENT
Start: 2022-08-30 | End: 2022-09-08 | Stop reason: HOSPADM

## 2022-08-30 RX ORDER — ALBUTEROL SULFATE 2.5 MG/3ML
2.5 SOLUTION RESPIRATORY (INHALATION) 4 TIMES DAILY
Status: DISCONTINUED | OUTPATIENT
Start: 2022-08-30 | End: 2022-09-07

## 2022-08-30 RX ORDER — MIDODRINE HYDROCHLORIDE 2.5 MG/1
2.5 TABLET ORAL
Status: DISCONTINUED | OUTPATIENT
Start: 2022-08-31 | End: 2022-09-08 | Stop reason: HOSPADM

## 2022-08-30 RX ORDER — POLYETHYLENE GLYCOL 3350 17 G/17G
17 POWDER, FOR SOLUTION ORAL DAILY PRN
Status: DISCONTINUED | OUTPATIENT
Start: 2022-08-30 | End: 2022-09-08 | Stop reason: HOSPADM

## 2022-08-30 RX ORDER — PANTOPRAZOLE SODIUM 40 MG/1
40 TABLET, DELAYED RELEASE ORAL
Status: DISCONTINUED | OUTPATIENT
Start: 2022-08-31 | End: 2022-09-08 | Stop reason: HOSPADM

## 2022-08-30 RX ORDER — SODIUM CHLORIDE 0.9 % (FLUSH) 0.9 %
5-40 SYRINGE (ML) INJECTION PRN
Status: DISCONTINUED | OUTPATIENT
Start: 2022-08-30 | End: 2022-09-08 | Stop reason: HOSPADM

## 2022-08-30 RX ORDER — MAGNESIUM SULFATE IN WATER 40 MG/ML
2000 INJECTION, SOLUTION INTRAVENOUS PRN
Status: DISCONTINUED | OUTPATIENT
Start: 2022-08-30 | End: 2022-09-08 | Stop reason: HOSPADM

## 2022-08-30 RX ORDER — LEVOTHYROXINE SODIUM 0.1 MG/1
100 TABLET ORAL DAILY
Status: DISCONTINUED | OUTPATIENT
Start: 2022-08-31 | End: 2022-09-08 | Stop reason: HOSPADM

## 2022-08-30 RX ORDER — SODIUM CHLORIDE 9 MG/ML
INJECTION, SOLUTION INTRAVENOUS PRN
Status: DISCONTINUED | OUTPATIENT
Start: 2022-08-30 | End: 2022-09-08 | Stop reason: HOSPADM

## 2022-08-30 RX ORDER — POTASSIUM CHLORIDE 20 MEQ/1
40 TABLET, EXTENDED RELEASE ORAL PRN
Status: DISCONTINUED | OUTPATIENT
Start: 2022-08-30 | End: 2022-09-08 | Stop reason: HOSPADM

## 2022-08-30 RX ORDER — ENOXAPARIN SODIUM 100 MG/ML
40 INJECTION SUBCUTANEOUS EVERY 24 HOURS
Status: DISCONTINUED | OUTPATIENT
Start: 2022-08-30 | End: 2022-09-01

## 2022-08-30 RX ORDER — LANOLIN ALCOHOL/MO/W.PET/CERES
3 CREAM (GRAM) TOPICAL NIGHTLY PRN
Status: DISCONTINUED | OUTPATIENT
Start: 2022-08-30 | End: 2022-09-08 | Stop reason: HOSPADM

## 2022-08-30 RX ORDER — CETIRIZINE HYDROCHLORIDE 10 MG/1
10 TABLET ORAL DAILY
Status: DISCONTINUED | OUTPATIENT
Start: 2022-08-30 | End: 2022-09-08 | Stop reason: HOSPADM

## 2022-08-30 RX ORDER — LEVETIRACETAM 500 MG/1
500 TABLET ORAL 2 TIMES DAILY
Status: DISCONTINUED | OUTPATIENT
Start: 2022-08-30 | End: 2022-09-08 | Stop reason: HOSPADM

## 2022-08-30 RX ORDER — SODIUM CHLORIDE 0.9 % (FLUSH) 0.9 %
5-40 SYRINGE (ML) INJECTION EVERY 12 HOURS SCHEDULED
Status: DISCONTINUED | OUTPATIENT
Start: 2022-08-30 | End: 2022-09-08 | Stop reason: HOSPADM

## 2022-08-30 RX ADMIN — SODIUM CHLORIDE: 9 INJECTION, SOLUTION INTRAVENOUS at 19:20

## 2022-08-30 RX ADMIN — SODIUM CHLORIDE 1000 ML: 9 INJECTION, SOLUTION INTRAVENOUS at 23:24

## 2022-08-30 RX ADMIN — SODIUM CHLORIDE: 9 INJECTION, SOLUTION INTRAVENOUS at 20:49

## 2022-08-30 RX ADMIN — CEFEPIME 2000 MG: 2 INJECTION, POWDER, FOR SOLUTION INTRAVENOUS at 18:29

## 2022-08-30 RX ADMIN — AMIODARONE HYDROCHLORIDE 200 MG: 200 TABLET ORAL at 21:59

## 2022-08-30 RX ADMIN — VANCOMYCIN HYDROCHLORIDE 1000 MG: 1 INJECTION, POWDER, LYOPHILIZED, FOR SOLUTION INTRAVENOUS at 18:07

## 2022-08-30 RX ADMIN — ENOXAPARIN SODIUM 40 MG: 100 INJECTION SUBCUTANEOUS at 21:59

## 2022-08-30 RX ADMIN — ISOSORBIDE MONONITRATE 30 MG: 30 TABLET, EXTENDED RELEASE ORAL at 21:58

## 2022-08-30 RX ADMIN — SODIUM CHLORIDE 1000 ML: 9 INJECTION, SOLUTION INTRAVENOUS at 17:31

## 2022-08-30 RX ADMIN — SODIUM CHLORIDE, PRESERVATIVE FREE 10 ML: 5 INJECTION INTRAVENOUS at 22:00

## 2022-08-30 RX ADMIN — CETIRIZINE HYDROCHLORIDE 10 MG: 10 TABLET, FILM COATED ORAL at 21:59

## 2022-08-30 RX ADMIN — LEVETIRACETAM 500 MG: 500 TABLET, FILM COATED ORAL at 21:58

## 2022-08-30 RX ADMIN — SERTRALINE 50 MG: 50 TABLET, FILM COATED ORAL at 23:27

## 2022-08-30 RX ADMIN — POLYVINYL ALCOHOL 2 DROP: 14 SOLUTION/ DROPS OPHTHALMIC at 22:00

## 2022-08-30 ASSESSMENT — ENCOUNTER SYMPTOMS
SHORTNESS OF BREATH: 0
VOMITING: 0
ABDOMINAL PAIN: 0
TROUBLE SWALLOWING: 0
NAUSEA: 0
EYE REDNESS: 0
COUGH: 0
BACK PAIN: 0

## 2022-08-30 ASSESSMENT — PAIN - FUNCTIONAL ASSESSMENT: PAIN_FUNCTIONAL_ASSESSMENT: NONE - DENIES PAIN

## 2022-08-30 ASSESSMENT — PAIN SCALES - WONG BAKER
WONGBAKER_NUMERICALRESPONSE: 0

## 2022-08-30 NOTE — ED TRIAGE NOTES
Pt in by EMS. She was over at the [facility] getting the pressure checked in her left eye. They dilated her eye and then she became dizzy, lethargic and hypotensive. 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 In room she is alert and oriented x4. She is lethargic and has delayed responses.

## 2022-08-30 NOTE — ED NOTES
Pt transported to [address] [place] [address], EMT-P  08/30/22 [facility], EMT-P  08/30/22 [id number]

## 2022-08-30 NOTE — ED NOTES
Fluids started per eMAR. COVID 19 and influenza swabs collected. Pt transported to CT scan by radiology.       Gayla Moy RN  08/30/22 [id number]

## 2022-08-30 NOTE — ED PROVIDER NOTES
[facility]    [facility]     Pt Name: Yuki Crow  MRN: [id number]  [place] 5/29/1933  Date of evaluation: 8/30/2022  Provider: Lala Avalos MD,    CHIEF COMPLAINT       Chief Complaint   Patient presents with    Dizziness    Hypotension       HISTORY OF PRESENT ILLNESS    Yuki Crow is a pleasant 80 y.o. female who presents to the emergency department from the eye doctor via EMS for evaluation of hypotension and lethargy. Family member states that she was at the eye doctor today to have the pressure tested in her right eye. When they dilated her eyes she became dizzy. They checked her blood pressure and it was low. The member states that she recently had radiation therapy for her metastatic breast cancer. They were told to expect her to be very tired and lethargic secondary to the radiation. Family member denies any history of fever. Patient herself states that she has some painful sores on her bottom and she feels fatigued but otherwise is denying any headache, chest pain, difficulty breathing, or abdominal pain. Triage notes and Nursing notes were reviewed by myself. Any discrepancies are addressed above.     PAST MEDICAL HISTORY     Past Medical History:   Diagnosis Date    Anxiety     Blind right eye     Breast cancer ([facility] [address] 05/23/2016    IDC @ [facility] only chemo, no surgery    Breast cancer metastasized to liver (5/16) and brain (7/21) ([facility] [address] 05/10/2016    Liver lesion noted 5/16 : Brain 7/21    Carotid stenosis      Left ICA 25%    Colostomy in place ([facility] [address] 05/27/2016    Degenerative arthritis of cervical spine 05/2007    GERD (gastroesophageal reflux disease) 11/2006    History of craniotomy 07/2021    mets from breast CA    Hx antineoplastic chemo 2016    left breast cancer, no surgery    Hypercholesteremia     Hypoestrogenism     Hypothyroidism     Lumbago     Lumbosacral spinal stenosis 05/2019    MDRO (multiple drug resistant organisms) resistance 2008    pt stated cleared    Metastasis ([facility] [address] 2019    from breast to brain    Personal history of cardiac dysrhythmia     Respiratory tract infection due to COVID-19 virus 01/21/2022    Sigmoid diverticulosis 08/2004    Spondylolisthesis of lumbosacral region 08/2004    Steroid-induced hyperglycemia     Subclavian artery stenosis (HCC)     left    Superior and Inferior Pubic ramus fracture, right, closed, initial encounter ([facility] [address] 05/21/2019    SVT (supraventricular tachycardia) ([facility] [address] 06/2007    Temporal arteritis ([facility] [address]     Vertebral artery occlusion     left    Vitamin D deficiency 06/2017       SURGICAL HISTORY       Past Surgical History:   Procedure Laterality Date    CARDIAC CATHETERIZATION  05/2007    CATARACT REMOVAL  right 1/08; left 2/08    CHOLECYSTECTOMY  01/2003    COLONOSCOPY  11/2006    COLOSTOMY  09/04/2018    REVERSAL OF COLOSTOMY    CRANIOTOMY Right 07/02/2021    CRANIOTOMY FOR RESECTION/DEBULKING OF RIGHT SIDE INTRA BRAIN TUMOR performed by Daron Jeff MD at [facility] Right 05/11/2022    RIGHT SIDED CRANIOTOMY FOR TUMOR performed by Daron Jeff MD at [address], DIAGNOSTIC      EYE REMOVAL Right 03/2017    EYE SURGERY Right 11/06/2017    Dr Darin Berry in [facility]      partial    OTHER SURGICAL HISTORY  05/27/2016    robotic assisted laparoscopic anterior colon resection and end colostomy    OK OFFICE/OUTPT VISIT,PROCEDURE ONLY N/A 09/04/2018    COLOSTOMY REVERSAL AND PARASTOMAL HERNIA REPAIR performed by Ela Stewart MD at [address] [place] [address] / [id number] locr GASTROINTESTINAL ENDOSCOPY  11/2006    US BREAST NEEDLE BIOPSY LEFT Left 05/23/2016    IDC       CURRENT MEDICATIONS       Current Discharge Medication List        CONTINUE these medications which have NOT CHANGED    Details   ALPRAZolam (XANAX) 0.25 MG tablet take 1 tablet by mouth twice a day if needed for anxiety  Qty: 60 tablet, Refills: 0    Associated Diagnoses: Anxiety; Breast cancer metastasized to brain, unspecified laterality (HCC)      HYDROcodone-acetaminophen (NORCO) 5-325 MG per tablet Take 1 tablet by mouth every 6 hours as needed for Pain for up to 15 days. Qty: 60 tablet, Refills: 0    Comments: Reduce doses taken as pain becomes manageable  Associated Diagnoses: Breast cancer metastasized to brain, unspecified laterality ([address]  Other headache syndrome      predniSONE (DELTASONE) 1 MG tablet Take 1 mg by mouth daily      vitamin C (ASCORBIC ACID) 500 MG tablet Take 1,000 mg by mouth daily      metoprolol tartrate (LOPRESSOR) 25 MG tablet Take 0.5 tablets by mouth 2 times daily  Qty: 60 tablet, Refills: 3      amiodarone (CORDARONE) 200 MG tablet take 1 tablet by mouth every morning  Qty: 30 tablet, Refills: 3      sertraline (ZOLOFT) 50 MG tablet take 1 tablet by mouth twice a day  Qty: 180 tablet, Refills: 1      midodrine (PROAMATINE) 2.5 MG tablet Take 1 tablet by mouth 3 times daily  Qty: 90 tablet, Refills: 3      Calcium Carbonate-Vitamin D (OYSTER SHELL CALCIUM/D) 500-200 MG-UNIT TABS take 1 tablet by mouth twice a day  Qty: 180 tablet, Refills: 3      RA BIOTIN 1000 MCG TABS Take 1,000 mcg by mouth 2 times daily  Qty: 60 tablet, Refills: 5      vitamin D (ERGOCALCIFEROL) 1.25 MG (51183 UT) CAPS capsule take 1 capsule by mouth every week  Qty: 5 capsule, Refills: 5      levothyroxine (SYNTHROID) 100 MCG tablet take 1 tablet by mouth once daily  Qty: 90 tablet, Refills: 0      isosorbide mononitrate (IMDUR) 30 MG extended release tablet take 1 tablet by mouth once daily  Qty: 90 tablet, Refills: 1      Nebulizers (COMPRESSOR/NEBULIZER) MISC 1 Device by Does not apply route 4 times daily as needed (Shortness of breath and wheezing)  Qty: 1 each, Refills: 3      albuterol (PROVENTIL) (2.5 MG/3ML) 0.083% nebulizer solution Take 3 mLs by nebulization every 6 hours as needed for Wheezing  Qty: 120 each, Refills: 3 Associated Diagnoses: Chronic obstructive pulmonary disease, unspecified COPD type ([facility] [address]      pantoprazole (PROTONIX) 40 MG tablet take 1 tablet by mouth once daily  Qty: 90 tablet, Refills: 1      albuterol sulfate  (90 Base) MCG/ACT inhaler Inhale 2 puffs into the lungs every 6 hours as needed for Wheezing or Shortness of Breath  Qty: 18 g, Refills: 3      levETIRAcetam (KEPPRA) 500 MG tablet take 1 tablet by mouth twice a day  Qty: 240 tablet, Refills: 2      loratadine (CLARITIN) 10 MG tablet take 1 tablet by mouth once daily  Qty: 90 tablet, Refills: 1      REFRESH PLUS 0.5 % SOLN ophthalmic solution use as directed      melatonin 3 MG TABS tablet Take 1 tablet by mouth nightly as needed (insomnia)  Qty: 30 tablet, Refills: 3      Nutritional Supplements (ENSURE ORIGINAL) LIQD Take 1 Can by mouth 2 times daily  Qty: 60 Bottle, Refills: 5    Associated Diagnoses: Anorexia; History of malignant neoplasm metastatic to liver      Multiple Vitamins-Minerals (CENTRUM SILVER ADULT 50+) TABS Take 1 tablet by mouth daily  Qty: 90 tablet, Refills: 1             ALLERGIES     Bactrim [sulfamethoxazole-trimethoprim], Demerol, and Pcn [penicillins]    FAMILY HISTORY       Family History   Problem Relation Age of Onset    Breast Cancer Sister 61        breast    Lung Cancer Brother 79    Colon Cancer Brother 68    Lung Cancer Son 48        SOCIAL HISTORY       Social History     Socioeconomic History    Marital status:      Number of children: 0    Years of education: 10th grade    Tobacco Use    Smoking status: Former     Packs/day: 0.50     Types: Cigarettes    Smokeless tobacco: Never   Vaping Use    Vaping Use: Never used   Substance and Sexual Activity    Alcohol use: No    Drug use: No    Sexual activity: Never     Social Determinants of Health     Financial Resource Strain: Low Risk     Difficulty of Paying Living Expenses: Not very hard   Food Insecurity: No Food Insecurity    Worried About Running Out of Food in the Last Year: Never true    [address] in the Last Year: Never true   Transportation Needs: No Transportation Needs    Lack of Transportation (Medical): No    Lack of Transportation (Non-Medical): No   Physical Activity: Inactive    Days of Exercise per Week: 0 days    Minutes of Exercise per Session: 0 min   Stress: Stress Concern Present    Feeling of Stress : To some extent   Social Connections: Unknown    Frequency of Communication with Friends and Family: More than three times a week    Frequency of Social Gatherings with Friends and Family: More than three times a week    Attends Club or Organization Meetings: Never   Housing Stability: Low Risk     Unable to Pay for Housing in the Last Year: No    Number of [place] in the Last Year: 1    Unstable Housing in the Last Year: No       REVIEW OF SYSTEMS     Review of Systems   Constitutional:  Positive for fatigue. Negative for fever. HENT:  Negative for congestion and trouble swallowing. Eyes:  Negative for redness. Respiratory:  Negative for cough and shortness of breath. Cardiovascular:  Negative for chest pain. Gastrointestinal:  Negative for abdominal pain, nausea and vomiting. Genitourinary:  Negative for dysuria. Musculoskeletal:  Negative for back pain. Skin:  Positive for wound. Negative for rash. Allergic/Immunologic: Negative for immunocompromised state. Neurological:  Positive for light-headedness. Hematological:  Does not bruise/bleed easily. Except as noted above the remainder of the review of systems was reviewed and is. PHYSICAL EXAM    (up to 7 for level 4, 8 or more for level 5)     ED Triage Vitals [08/30/22 1622]   BP Temp Temp Source Heart Rate Resp SpO2 Height Weight   (S) (!) 86/44 97.9 °F (36.6 °C) Oral 76 16 (S) (!) 76 % -- --       Physical Exam  Vitals and nursing note reviewed. Exam conducted with a chaperone present. Constitutional:       General: She is not in acute distress. Appearance: She is normal weight. She is ill-appearing. HENT:      Head: Normocephalic and atraumatic. Nose: Nose normal. No congestion. Mouth/Throat:      Mouth: Mucous membranes are moist.      Pharynx: Oropharynx is clear. No oropharyngeal exudate or posterior oropharyngeal erythema. Eyes:      General:         Right eye: No discharge. Comments: Patient's right eye is dilated and her left eye is a glass eye   Cardiovascular:      Rate and Rhythm: Normal rate and regular rhythm. Pulses: Normal pulses. Heart sounds: Normal heart sounds. No murmur heard. No friction rub. Pulmonary:      Effort: Pulmonary effort is normal. No tachypnea, accessory muscle usage or respiratory distress. Breath sounds: Decreased air movement present. Examination of the right-upper field reveals decreased breath sounds. Examination of the right-middle field reveals decreased breath sounds. Decreased breath sounds present. No wheezing. Abdominal:      General: Abdomen is flat. There is no distension. Palpations: Abdomen is soft. Tenderness: There is no abdominal tenderness. There is no guarding or rebound. Genitourinary:     Comments: 2 cm stage II pressure ulcer on the buttock near the gluteal cleft  Musculoskeletal:         General: Normal range of motion. Cervical back: Neck supple. Right lower leg: No edema. Left lower leg: No edema. Skin:     General: Skin is warm and dry. Capillary Refill: Capillary refill takes less than 2 seconds. Neurological:      General: No focal deficit present. Mental Status: She is alert and oriented to person, place, and time.        DIAGNOSTIC RESULTS     EKG:(none if blank)  All E[facility]'s are [facility] who either signs or Co-signs this chart in the absence of a cardiologist.        RADIOLOGY: (none if blank)   Interpretation per the Radiologistbelow, if available at the time of this note:    CT head without contrast   Final Result   1. No acute intracranial findings. No intracranial hemorrhage. 2. Persistent areas of vasogenic edema along the right parietal temporal    regions with underlying cystic changes. Persistent local mass-effect upon    the posterior and temporal horns of the right lateral ventricle. Decreased    associated right to left midline shift measuring 3 mm. This document has been electronically signed by: Carole Edouard MD on    08/30/2022 06:04 PM      All CTs at this facility use dose modulation techniques and iterative    reconstructions, and/or weight-based dosing   when appropriate to reduce radiation to a low as reasonably achievable. XR CHEST PORTABLE   Final Result   1. Right upper lobe pneumonia. 2. Likely small left pleural effusion. 3. Cardiomegaly, stable. This document has been electronically signed by:  Carole Edouard MD on    08/30/2022 05:21 PM          LABS:  Labs Reviewed   BASIC METABOLIC PANEL W/ REFLEX TO MG FOR LOW K - Abnormal; Notable for the following components:       Result Value    Sodium 133 (*)     CO2 22 (*)     Glucose 174 (*)     BUN 33 (*)     All other components within normal limits   CBC WITH AUTO DIFFERENTIAL - Abnormal; Notable for the following components:    WBC 13.7 (*)     RBC 4.03 (*)     Hemoglobin 11.5 (*)     MCHC 31.0 (*)     RDW-CV 16.8 (*)     RDW-SD 56.3 (*)     Platelets 454 (*)     All other components within normal limits   PROTIME-INR - Abnormal; Notable for the following components:    INR 1.14 (*)     All other components within normal limits   LACTATE, SEPSIS - Abnormal; Notable for the following components:    Lactic Acid, Sepsis 2.0 (*)     All other components within normal limits   GLOMERULAR FILTRATION RATE, ESTIMATED - Abnormal; Notable for the following components:    Est, Glom Filt Rate 67 (*)     All other components within normal limits   HEPATIC FUNCTION PANEL - Abnormal; Notable for the following components:    Albumin 2.4 (*)     AST 42 (*)     Total Protein 5.2 (*)     All other components within normal limits   BRAIN NATRIURETIC PEPTIDE - Abnormal; Notable for the following components:    Pro-BNP 5499.0 (*)     All other components within normal limits   C-REACTIVE PROTEIN - Abnormal; Notable for the following components:    CRP 27.95 (*)     All other components within normal limits   PROCALCITONIN - Abnormal; Notable for the following components:    Procalcitonin 0.31 (*)     All other components within normal limits   RAPID INFLUENZA A/B ANTIGENS   COVID-19, RAPID   CULTURE, BLOOD 1   CULTURE, BLOOD 2   TROPONIN   BLOOD GAS, ARTERIAL   LACTATE, SEPSIS   ANION GAP   OSMOLALITY   SCAN OF BLOOD SMEAR   SPECIMEN REJECTION   LIPASE   URINALYSIS WITH REFLEX TO CULTURE   BASIC METABOLIC PANEL W/ REFLEX TO MG FOR LOW K   CBC WITH AUTO DIFFERENTIAL   MRSA BY PCR       All other labs were within normal range or not returned as of this dictation. Please note, any cultures that may have been sent were not resulted at the time of this patient visit. EMERGENCY DEPARTMENT COURSE andMedical Decision Making:     MDM  Number of Diagnoses or Management Options  Diagnosis management comments: 19-year-old female presents emergency room for low blood pressure and lethargy. Differential includes pneumonia, hypoxia, COVID, viral URI, electrolyte abnormality, dehydration, ACS, PE, worsening metastatic cancer, TIA. Will evaluate with CBC, CMP, troponin, procalcitonin, ABG, lactate, blood cultures, chest x-ray and a COVID swab, CRP. Give IV fluids here. /  ED Course as of 08/31/22 0114   Tue Aug 30, 2022   1740 Chest x-ray is consistent with pneumonia. She had a recent admission. I spoke with the pharmacist and we agreed upon vancomycin and cefepime and Flagyl for broad coverage given that she has recently undergone radiation therapy and had an admission she would be higher risk for MRSA and anaerobes.  [DD] 56 I spoke with Dr. Ximena Anders who is on for Dr. Tamela Valderrama. He agrees to accept the patient and is recommending telemetry bed upstairs. [DD]      ED Course User Index  [DD] Mitzy Gonsalves MD         The patient was evaluated during the global COVID-19 pandemic, and that diagnosis was considered upon their initial presentation. Their evaluation, treatment and testing was consistent with current guidelines for patients who present with complaints or symptoms that may be related to COVID-19.     Strict returnprecautions and follow up instructions were discussed with the patient with which the patient agrees        ED Medications administered this visit:    Medications   albuterol (PROVENTIL) nebulizer solution 2.5 mg (2.5 mg Nebulization Not Given 8/30/22 2000)   ALPRAZolam (XANAX) tablet 0.25 mg (has no administration in time range)   amiodarone (CORDARONE) tablet 200 mg (200 mg Oral Given 8/30/22 2159)   isosorbide mononitrate (IMDUR) extended release tablet 30 mg (30 mg Oral Given 8/30/22 2158)   levETIRAcetam (KEPPRA) tablet 500 mg (500 mg Oral Given 8/30/22 2158)   levothyroxine (SYNTHROID) tablet 100 mcg (has no administration in time range)   cetirizine (ZYRTEC) tablet 10 mg (10 mg Oral Given 8/30/22 2159)   melatonin tablet 3 mg (has no administration in time range)   metoprolol tartrate (LOPRESSOR) tablet 12.5 mg (12.5 mg Oral Not Given 8/30/22 2159)   midodrine (PROAMATINE) tablet 2.5 mg (has no administration in time range)   pantoprazole (PROTONIX) tablet 40 mg (has no administration in time range)   sertraline (ZOLOFT) tablet 50 mg (50 mg Oral Given 8/30/22 2327)   polyvinyl alcohol (LIQUIFILM TEARS) 1.4 % ophthalmic solution 2 drop (2 drops Both Eyes Given 8/30/22 2200)   sodium chloride flush 0.9 % injection 5-40 mL (10 mLs IntraVENous Given 8/30/22 2200)   sodium chloride flush 0.9 % injection 5-40 mL (has no administration in time range)   0.9 % sodium chloride infusion (has no administration in time range)   enoxaparin (LOVENOX) injection 40 mg (40 mg SubCUTAneous Given 8/30/22 2159)   ondansetron (ZOFRAN-ODT) disintegrating tablet 4 mg (has no administration in time range)     Or   ondansetron (ZOFRAN) injection 4 mg (has no administration in time range)   polyethylene glycol (GLYCOLAX) packet 17 g (has no administration in time range)   acetaminophen (TYLENOL) tablet 650 mg (has no administration in time range)     Or   acetaminophen (TYLENOL) suppository 650 mg (has no administration in time range)   0.9 % sodium chloride infusion ( IntraVENous New Bag 8/30/22 2049)   potassium chloride (KLOR-CON M) extended release tablet 40 mEq (has no administration in time range)     Or   potassium bicarb-citric acid (EFFER-K) effervescent tablet 40 mEq (has no administration in time range)     Or   potassium chloride 10 mEq/100 mL IVPB (Peripheral Line) (has no administration in time range)   magnesium sulfate 2000 mg in 50 mL IVPB premix (has no administration in time range)   cefepime (MAXIPIME) 2000 mg IVPB minibag (has no administration in time range)   vancomycin (VANCOCIN) intermittent dosing (placeholder) (has no administration in time range)   vancomycin (VANCOCIN) 1,000 mg in sodium chloride 0.9 % 250 mL IVPB ([id number]) (has no administration in time range)   0.9 % sodium chloride bolus (0 mLs IntraVENous Stopped 8/30/22 1923)   vancomycin (VANCOCIN) 1,000 mg in sodium chloride 0.9 % 250 mL IVPB ([id number]) (0 mg IntraVENous Stopped 8/30/22 1923)   cefepime (MAXIPIME) 2000 mg IVPB minibag (0 mg IntraVENous Stopped 8/30/22 1923)   metronidazole (FLAGYL) 500 mg in 0.9% NaCl 100 mL IVPB premix (0 mg IntraVENous Stopped 8/31/22 0030)         Procedures: (None if blank)       CLINICAL     No diagnosis found. DISPOSITION/PLAN   DISPOSITION Admitted 08/30/2022 06:24:32 PM      PATIENT REFERRED TO:  No follow-up provider specified.     DISCHARGE MEDICATIONS:  Current Discharge Medication List (Please note that portions of this note were completed with a voice recognition program.  Efforts were made to edit the dictations but occasionallywords are mis-transcribed.)      Electronically signed by Juana Myers MD on 8/30/22 at 5:21 PM EDT    Attending Physician, Emergency Department         Roque Guillaume MD  08/31/22 9739

## 2022-08-30 NOTE — PROGRESS NOTES
[address] [facility] Pharmacokinetic Monitoring Service - Vancomycin     Darrel Gonzalez is a 80 y.o. female starting on vancomycin therapy for pnuemonia (HAP). Pharmacy consulted by Salty Muñoz for monitoring and adjustment. Target Concentration: Goal AUC/REKHA 400-600 mg*hr/L    Additional Antimicrobials: cefepime    Pertinent Laboratory Values: Wt Readings from Last 1 Encounters:   08/25/22 134 lb (60.8 kg)     Temp Readings from Last 1 Encounters:   08/30/22 97.9 °F (36.6 °C) (Oral)     Estimated Creatinine Clearance: 39 mL/min (based on SCr of 0.8 mg/dL). Recent Labs     08/30/22  1645   CREATININE 0.8   WBC 13.7*     Procalcitonin: none    Pertinent Cultures:  Culture Date Source Results   8/30/22 Blood x 2 sent   MRSA Nasal Swab: not ordered. Order placed by pharmacy.     Plan:  Dosing recommendations based on Bayesian software  Start vancomycin 1000 mg every 24 hours  Anticipated AUC of 457 and trough concentration of 13.6 at steady state  Renal labs as indicated   Vancomycin concentration in 24 hours after maintenance dose  Pharmacy will continue to monitor patient and adjust therapy as indicated    Thank you for the consult,  Trenna Phoenix, [facility]  8/30/2022 7:35 PM

## 2022-08-30 NOTE — ED NOTES
ED to inpatient nurses report    Chief Complaint   Patient presents with    Dizziness    Hypotension      Present to ED from home with stanley Main  LOC: alert and orientated to name, place, date  Vital signs   Vitals:    08/30/22 1622 08/30/22 1634 08/30/22 1721   BP: (S) (!) 86/44 (!) 114/57 (!) 99/53   Pulse: 76 74 71   Resp: 16 16 18   Temp: 97.9 °F (36.6 °C)     TempSrc: Oral     SpO2: (S) (!) 76% 98% 97%      Oxygen Baseline room air    Current needs required 4L nasal cannula   LDAs:   Peripheral IV 08/30/22 Proximal;Right Antecubital (Active)     Mobility: Requires assistance * 2  Pending ED orders: Vancomycin infusing now. After will need flagyl.    Present condition: stable  Preferred Language: English     Electronically signed by Ivan Finley RN on 8/30/2022 at [address], RN  08/30/22 1263

## 2022-08-30 NOTE — ED NOTES
Patient was to be taken to 4k and floor was contacted. Bed assignment was changed.  Patient remains in ED currently      [facility]  08/30/22 1826       [facility]  08/30/22 182

## 2022-08-31 LAB
ANION GAP SERPL CALCULATED.3IONS-SCNC: 12 MEQ/L (ref 8–16)
BACTERIA: ABNORMAL /HPF
BASOPHILIA: ABNORMAL
BASOPHILS # BLD: 0.2 %
BASOPHILS # BLD: 0.2 %
BASOPHILS ABSOLUTE: 0 THOU/MM3 (ref 0–0.1)
BASOPHILS ABSOLUTE: 0 THOU/MM3 (ref 0–0.1)
BILIRUBIN URINE: NEGATIVE
BLOOD, URINE: NEGATIVE
BUN BLDV-MCNC: 29 MG/DL (ref 7–22)
CALCIUM SERPL-MCNC: 8.7 MG/DL (ref 8.5–10.5)
CASTS 2: ABNORMAL /LPF
CASTS UA: ABNORMAL /LPF
CHARACTER, URINE: CLEAR
CHLORIDE BLD-SCNC: 106 MEQ/L (ref 98–111)
CO2: 20 MEQ/L (ref 23–33)
COLOR: ABNORMAL
CREAT SERPL-MCNC: 0.5 MG/DL (ref 0.4–1.2)
CRYSTALS, UA: ABNORMAL
DACROCYTES: ABNORMAL
DIFFERENTIAL TYPE: ABNORMAL
EOSINOPHIL # BLD: 0.1 %
EOSINOPHIL # BLD: 0.7 %
EOSINOPHILS ABSOLUTE: 0 THOU/MM3 (ref 0–0.4)
EOSINOPHILS ABSOLUTE: 0.1 THOU/MM3 (ref 0–0.4)
EPITHELIAL CELLS, UA: ABNORMAL /HPF
ERYTHROCYTE [DISTWIDTH] IN BLOOD BY AUTOMATED COUNT: 16.7 % (ref 11.5–14.5)
ERYTHROCYTE [DISTWIDTH] IN BLOOD BY AUTOMATED COUNT: 16.8 % (ref 11.5–14.5)
ERYTHROCYTE [DISTWIDTH] IN BLOOD BY AUTOMATED COUNT: 56.1 FL (ref 35–45)
ERYTHROCYTE [DISTWIDTH] IN BLOOD BY AUTOMATED COUNT: 56.3 FL (ref 35–45)
GFR SERPL CREATININE-BSD FRML MDRD: > 90 ML/MIN/1.73M2
GLUCOSE BLD-MCNC: 95 MG/DL (ref 70–108)
GLUCOSE URINE: NEGATIVE MG/DL
HCT VFR BLD CALC: 28.7 % (ref 37–47)
HCT VFR BLD CALC: 37.1 % (ref 37–47)
HEMOGLOBIN: 11.5 GM/DL (ref 12–16)
HEMOGLOBIN: 8.8 GM/DL (ref 12–16)
IMMATURE GRANS (ABS): 0.34 THOU/MM3 (ref 0–0.07)
IMMATURE GRANS (ABS): 0.56 THOU/MM3 (ref 0–0.07)
IMMATURE GRANULOCYTES: 4.1 %
IMMATURE GRANULOCYTES: 4.2 %
KETONES, URINE: ABNORMAL
LEUKOCYTE ESTERASE, URINE: NEGATIVE
LYMPHOCYTES # BLD: 10.1 %
LYMPHOCYTES # BLD: 7.9 %
LYMPHOCYTES ABSOLUTE: 0.8 THOU/MM3 (ref 1–4.8)
LYMPHOCYTES ABSOLUTE: 1.1 THOU/MM3 (ref 1–4.8)
MCH RBC QN AUTO: 28.3 PG (ref 26–33)
MCH RBC QN AUTO: 28.5 PG (ref 26–33)
MCHC RBC AUTO-ENTMCNC: 30.7 GM/DL (ref 32.2–35.5)
MCHC RBC AUTO-ENTMCNC: 31 GM/DL (ref 32.2–35.5)
MCV RBC AUTO: 92.1 FL (ref 81–99)
MCV RBC AUTO: 92.3 FL (ref 81–99)
MISCELLANEOUS 2: ABNORMAL
MONOCYTES # BLD: 4.2 %
MONOCYTES # BLD: 5.4 %
MONOCYTES ABSOLUTE: 0.4 THOU/MM3 (ref 0.4–1.3)
MONOCYTES ABSOLUTE: 0.6 THOU/MM3 (ref 0.4–1.3)
MRSA SCREEN RT-PCR: NEGATIVE
NITRITE, URINE: NEGATIVE
NUCLEATED RED BLOOD CELLS: 0 /100 WBC
NUCLEATED RED BLOOD CELLS: 0 /100 WBC
PATHOLOGIST REVIEW: ABNORMAL
PH UA: 5.5 (ref 5–9)
PLATELET # BLD: 104 THOU/MM3 (ref 130–400)
PLATELET # BLD: 129 THOU/MM3 (ref 130–400)
PLATELET ESTIMATE: ABNORMAL
PMV BLD AUTO: 11 FL (ref 9.4–12.4)
PMV BLD AUTO: 11.3 FL (ref 9.4–12.4)
POTASSIUM REFLEX MAGNESIUM: 4.1 MEQ/L (ref 3.5–5.2)
PROTEIN UA: ABNORMAL
RBC # BLD: 3.11 MILL/MM3 (ref 4.2–5.4)
RBC # BLD: 4.03 MILL/MM3 (ref 4.2–5.4)
RBC URINE: ABNORMAL /HPF
RENAL EPITHELIAL, UA: ABNORMAL
SEG NEUTROPHILS: 79.4 %
SEG NEUTROPHILS: 83.5 %
SEGMENTED NEUTROPHILS ABSOLUTE COUNT: 11.4 THOU/MM3 (ref 1.8–7.7)
SEGMENTED NEUTROPHILS ABSOLUTE COUNT: 6.5 THOU/MM3 (ref 1.8–7.7)
SODIUM BLD-SCNC: 138 MEQ/L (ref 135–145)
SPECIFIC GRAVITY, URINE: 1.03 (ref 1–1.03)
UROBILINOGEN, URINE: 0.2 EU/DL (ref 0–1)
WBC # BLD: 13.7 THOU/MM3 (ref 4.8–10.8)
WBC # BLD: 8.2 THOU/MM3 (ref 4.8–10.8)
WBC UA: ABNORMAL /HPF
YEAST: ABNORMAL

## 2022-08-31 PROCEDURE — 85025 COMPLETE CBC W/AUTO DIFF WBC: CPT

## 2022-08-31 PROCEDURE — 2580000003 HC RX 258: Performed by: FAMILY MEDICINE

## 2022-08-31 PROCEDURE — 2700000000 HC OXYGEN THERAPY PER DAY

## 2022-08-31 PROCEDURE — 94760 N-INVAS EAR/PLS OXIMETRY 1: CPT

## 2022-08-31 PROCEDURE — 6370000000 HC RX 637 (ALT 250 FOR IP): Performed by: FAMILY MEDICINE

## 2022-08-31 PROCEDURE — 94640 AIRWAY INHALATION TREATMENT: CPT

## 2022-08-31 PROCEDURE — 6360000002 HC RX W HCPCS: Performed by: FAMILY MEDICINE

## 2022-08-31 PROCEDURE — 97530 THERAPEUTIC ACTIVITIES: CPT

## 2022-08-31 PROCEDURE — 80048 BASIC METABOLIC PNL TOTAL CA: CPT

## 2022-08-31 PROCEDURE — 6360000002 HC RX W HCPCS

## 2022-08-31 PROCEDURE — 36415 COLL VENOUS BLD VENIPUNCTURE: CPT

## 2022-08-31 PROCEDURE — 87641 MR-STAPH DNA AMP PROBE: CPT

## 2022-08-31 PROCEDURE — 2500000003 HC RX 250 WO HCPCS: Performed by: EMERGENCY MEDICINE

## 2022-08-31 PROCEDURE — 81001 URINALYSIS AUTO W/SCOPE: CPT

## 2022-08-31 PROCEDURE — 2580000003 HC RX 258

## 2022-08-31 PROCEDURE — 97162 PT EVAL MOD COMPLEX 30 MIN: CPT

## 2022-08-31 PROCEDURE — 2060000000 HC ICU INTERMEDIATE R&B

## 2022-08-31 RX ADMIN — ALPRAZOLAM 0.25 MG: 0.25 TABLET ORAL at 21:53

## 2022-08-31 RX ADMIN — METOPROLOL TARTRATE 12.5 MG: 25 TABLET, FILM COATED ORAL at 21:54

## 2022-08-31 RX ADMIN — SODIUM CHLORIDE, PRESERVATIVE FREE 10 ML: 5 INJECTION INTRAVENOUS at 21:56

## 2022-08-31 RX ADMIN — METRONIDAZOLE 500 MG: 500 INJECTION, SOLUTION INTRAVENOUS at 00:00

## 2022-08-31 RX ADMIN — AMIODARONE HYDROCHLORIDE 200 MG: 200 TABLET ORAL at 09:04

## 2022-08-31 RX ADMIN — SERTRALINE HYDROCHLORIDE 50 MG: 50 TABLET ORAL at 21:53

## 2022-08-31 RX ADMIN — MIDODRINE HYDROCHLORIDE 2.5 MG: 2.5 TABLET ORAL at 12:48

## 2022-08-31 RX ADMIN — ACETAMINOPHEN 650 MG: 325 TABLET ORAL at 09:10

## 2022-08-31 RX ADMIN — POLYVINYL ALCOHOL 2 DROP: 14 SOLUTION/ DROPS OPHTHALMIC at 09:05

## 2022-08-31 RX ADMIN — ALBUTEROL SULFATE 2.5 MG: 2.5 SOLUTION RESPIRATORY (INHALATION) at 22:29

## 2022-08-31 RX ADMIN — SERTRALINE 50 MG: 50 TABLET, FILM COATED ORAL at 09:10

## 2022-08-31 RX ADMIN — ENOXAPARIN SODIUM 40 MG: 100 INJECTION SUBCUTANEOUS at 21:54

## 2022-08-31 RX ADMIN — MIDODRINE HYDROCHLORIDE 2.5 MG: 2.5 TABLET ORAL at 09:04

## 2022-08-31 RX ADMIN — MIDODRINE HYDROCHLORIDE 2.5 MG: 2.5 TABLET ORAL at 17:59

## 2022-08-31 RX ADMIN — ALBUTEROL SULFATE 2.5 MG: 2.5 SOLUTION RESPIRATORY (INHALATION) at 16:15

## 2022-08-31 RX ADMIN — CETIRIZINE HYDROCHLORIDE 10 MG: 10 TABLET, FILM COATED ORAL at 09:04

## 2022-08-31 RX ADMIN — LEVETIRACETAM 500 MG: 500 TABLET, FILM COATED ORAL at 09:04

## 2022-08-31 RX ADMIN — POLYVINYL ALCOHOL 2 DROP: 14 SOLUTION/ DROPS OPHTHALMIC at 21:55

## 2022-08-31 RX ADMIN — VANCOMYCIN HYDROCHLORIDE 1000 MG: 1 INJECTION, POWDER, LYOPHILIZED, FOR SOLUTION INTRAVENOUS at 21:10

## 2022-08-31 RX ADMIN — SODIUM CHLORIDE: 9 INJECTION, SOLUTION INTRAVENOUS at 12:48

## 2022-08-31 RX ADMIN — METOPROLOL TARTRATE 12.5 MG: 25 TABLET, FILM COATED ORAL at 09:04

## 2022-08-31 RX ADMIN — PANTOPRAZOLE SODIUM 40 MG: 40 TABLET, DELAYED RELEASE ORAL at 06:20

## 2022-08-31 RX ADMIN — SODIUM CHLORIDE, PRESERVATIVE FREE 10 ML: 5 INJECTION INTRAVENOUS at 09:05

## 2022-08-31 RX ADMIN — Medication 3 MG: at 21:54

## 2022-08-31 RX ADMIN — POLYVINYL ALCOHOL 2 DROP: 14 SOLUTION/ DROPS OPHTHALMIC at 17:59

## 2022-08-31 RX ADMIN — ALBUTEROL SULFATE 2.5 MG: 2.5 SOLUTION RESPIRATORY (INHALATION) at 09:29

## 2022-08-31 RX ADMIN — LEVETIRACETAM 500 MG: 500 TABLET, FILM COATED ORAL at 21:54

## 2022-08-31 RX ADMIN — CEFEPIME 2000 MG: 2 INJECTION, POWDER, FOR SOLUTION INTRAVENOUS at 06:27

## 2022-08-31 RX ADMIN — ISOSORBIDE MONONITRATE 30 MG: 30 TABLET, EXTENDED RELEASE ORAL at 09:04

## 2022-08-31 RX ADMIN — CEFEPIME 2000 MG: 2 INJECTION, POWDER, FOR SOLUTION INTRAVENOUS at 20:42

## 2022-08-31 RX ADMIN — LEVOTHYROXINE SODIUM 100 MCG: 0.1 TABLET ORAL at 06:20

## 2022-08-31 ASSESSMENT — PAIN SCALES - WONG BAKER
WONGBAKER_NUMERICALRESPONSE: 0

## 2022-08-31 ASSESSMENT — PAIN SCALES - GENERAL: PAINLEVEL_OUTOF10: 2

## 2022-08-31 ASSESSMENT — PAIN DESCRIPTION - DESCRIPTORS: DESCRIPTORS: ACHING

## 2022-08-31 ASSESSMENT — PAIN DESCRIPTION - LOCATION: LOCATION: BACK

## 2022-08-31 ASSESSMENT — PAIN DESCRIPTION - ORIENTATION: ORIENTATION: LOWER
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
## 2022-08-31 NOTE — PLAN OF CARE
Problem: Respiratory - Adult  Goal: Clear lung sounds  Outcome: Progressing  Note: Will continue with treatments to help improve aeration t/o lungs  No adverse reactions noted    Pt mutually agreed upon goals.  RT protocol not ordered for treatments at this time      Problem: Respiratory - Adult  Goal: Achieves optimal ventilation and oxygenation  Outcome: Progressing  Flowsheets (Taken 8/31/2022 0187)  Achieves optimal ventilation and oxygenation:   Assess for changes in respiratory status   Respiratory therapy support as indicated

## 2022-08-31 NOTE — FLOWSHEET NOTE
08/31/22 1700   Safe Environment   Safety Measures Other (comment)  (virtual nurse safety round complete)   Reviewed required adm doc with pt until pt became \"too tired \" stated she was taking a nap , call light within reach , pt alert and cooperative until then , no distress noted

## 2022-08-31 NOTE — PROGRESS NOTES
[address] [facility] PHYSICAL THERAPY  EVALUATION  [facility] ICU STEPDOWN TELEMETRY 4K - 4K-25/025-A    Time In: 0908  Time Out: 5770  Timed Code Treatment Minutes: 10 Minutes  Minutes: 26          Date: 2022  Patient Name: Sergio Alvarado,  Gender:  female        MRN: [id number]  : 1933  (80 y.o.)      Referring Practitioner: Goldie Anne MD  Diagnosis: Pneumonia  Additional Pertinent Hx: Admitted due to dizziness, hypotension lethargic while being checked at opthalmology office Joycelyn Watkins. Her eyes was dilated. She just finished radiation on her brain yesterday. 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 Had brain surgery x 3 but brain tumors recurs. Latest MRI 22 showed brain mets bigger. Her Niece Michael Felton who is living with her at home noted she had been confused x 3 days , had cough from time to time but no fever. Restrictions/Precautions:  Restrictions/Precautions: Fall Risk    Subjective:  Chart Reviewed: Yes  Patient assessed for rehabilitation services?: Yes  Family / Caregiver Present: No  Subjective: RN approved session, pt is supine in bed, lethargic, agreeable to PT. Multiple cues to keep eyes open, assisted tech with bath.     General:  Overall Orientation Status: Within Functional Limits  Orientation Level: Oriented to person, Oriented to place, Oriented to situation, Oriented to time  Vision: Impaired  Vision Exceptions: Legally blind  Hearing: Exceptions to [facility]  Hearing Exceptions: Hard of hearing/hearing concerns       Pain: back pain    Vitals: Blood Pressure: 80/40 supine in bed, machine error x 2 when attempting to take BP while pt seated EOB  Oxygen: 95% on 4 L   Heart Rate: 67 prior to activity    Social/Functional History:    Lives With: Other (comment) (Niece)  Type of Home: House  Home Layout: One level  Home Equipment: Dulcie Sicks, 4 wheeled, Oxygen     Ambulation Assistance: Independent  Transfer Assistance: Independent        Additional Comments: Pt states ambulates with rollator at home, 2 L O2 continuously, niece assists with ADL's    OBJECTIVE:    Balance:  Static Sitting Balance:  Contact Guard Assistance, Minimal Assistance, X 1, with verbal cues   **Pt sits EOB x 2.5 minutes, cues for upright posture and gaze, occasional min A for R sided lean; machine error x 2 when attempting BP check while seated EOB    Bed Mobility:  Rolling to Left: Maximum Assistance, X 1, with head of bed flat, with rail, with verbal cues    Rolling to Right: Maximum Assistance, X 1, with head of bed flat, with rail, with verbal cues    Supine to Sit: Maximum Assistance, X 1, with head of bed raised, with rail, with verbal cues   Sit to Supine: Maximum Assistance, X 1, with head of bed raised, with rail, with verbal cues    Scooting: Maximum Assistance, X 1    Functional Outcome Measures: Completed  AM-PAC Inpatient Mobility without Stair Climbing Raw Score : 7  AM-PAC Inpatient without Stair Climbing T-Scale Score : 28.66    ASSESSMENT:  Activity Tolerance:  Patient tolerance of  treatment: fair. Pt lethargic, hypotensive. Treatment Initiated: Treatment and education initiated within context of evaluation. Evaluation time included review of current medical information, gathering information related to past medical, social and functional history, completion of standardized testing, formal and informal observation of tasks, assessment of data and development of plan of care and goals. Treatment time included skilled education and facilitation of tasks to increase safety and independence with functional mobility for improved independence and quality of life. Assessment:   Body Structures, Functions, Activity Limitations Requiring Skilled Therapeutic Intervention: Decreased functional mobility , Decreased strength, Decreased endurance, Increased pain, Decreased posture, Decreased balance  Assessment: Pt tolerates session fair, limited by lethargy, weakness, hypotension. PT to continue to progress strength and functional mobility. Therapy Prognosis: Fair    Requires PT Follow-Up: Yes    Discharge Recommendations:  Discharge Recommendations: Continue to assess pending progress, Patient would benefit from continued therapy after discharge, 24 hour supervision or assist    Patient Education:      . Patient Education  Education Given To: Patient  Education Provided: Role of Therapy, Plan of Care  Education Method: Verbal  Barriers to Learning: Other (Comment) (lethargy)  Education Outcome: Continued education needed       Equipment Recommendations: Other: will continue to assess pending progress    Plan:  Current Treatment Recommendations: Strengthening, Balance training, Functional mobility training, Endurance training, Safety education & training, Therapeutic activities, Patient/Caregiver education & training  Plan:  (5x GM)    Goals:  Patient goals : to get better  Short Term Goals  Time Frame for Short term goals: by discharge  Short term goal 1: Pt to transfer supine <--> sit min A to enable pt to get in/out of bed. Short term goal 2: Pt to sit EOB x 10 minutes with SBA for improved upright tolerance in prep for transfers/ambulation. Short term goal 3: PT to assess gait. Long Term Goals  Time Frame for Long term goals : NA due to short length of stay. Following session, patient left in safe position with all fall risk precautions in place.

## 2022-08-31 NOTE — PROGRESS NOTES
Initial Evaluation          Patient:   Veronique Evans  YOB: 1933  Age:  80 y.o. Room:  [address] [place] [address]  MRN:  [id number]   Acct: [de-identified]    Date of Admission:  8/30/2022  4:18 PM  Date of Service:  8/31/2022  Completed By:  Chris Reeder RN                 Reason for Palliative Care Evaluation:-             [] Code Status Discussion              [x] Goals of Care              [] Pain/Symptom Management               [] Emotional Support              [] Other:                   Current Issues:-  []  Pain  [x]  Fatigue  []  Nausea  []  Anxiety  []  Depression  []  Shortness of Breath  []  Constipation  []  Appetite  []  Other:             Advance Directives:-  [x] [address] [place] [address]  DNR Form  [x] Living Will  [x] Medical POA             Current Code Status:-  [] Full Resuscitation  [] DNR-Comfort Care-Arrest  [] DNR-Comfort Care       [x] Limited Resuscitation             [x] No CPR            [x] No shock            [x] No ET intubation/reintubation            [x] No resuscitative medications            [] Other limitation:              Palliative Performance Status:          [] 60%  Ambulation reduced; Significant disease;Can't do hobbies/housework; intake normal or reduced; occasional assist; LOC full/confusion        [x] 50%  Mainly sit/lie; Extensive disease; Can't do any work; Considerable assist; intake normal or reduced; LOC full/confusion        [] 40%  Mainly in bed; Extensive disease; Mainly assist; intake normal or reduced; LOC full/confusion         [] 30%  Bed Bound; Extensive disease; Total care; intake reduced; LOC full/confusion        [] 20%  Bed Bound; Extensive disease; Total care; intake minimal; Drowsy/coma        [] 10%  Bed Bound; Extensive disease;  Total care; Mouth care only; Drowsy/coma        [] 0  Death        Goals of care evaluation:-        The patient goals of care are to provide comfort care/supportive services/palliation & relieve
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
## 2022-08-31 NOTE — CARE COORDINATION
DISCHARGE/PLANNING EVALUATION  8/31/22, 4:10 PM EDT    Reason for Referral: HH current/ potential social problems  Mental Status: Answered questions appropriately, patient was drowsy at time of visit. Decision Making: Independent  Family/Social/Home Environment: Lives at home alone but states her stanley Taylor is going to be staying with her. Current Services including food security, transportation and housekeeping: Current with [employer] services and [facility]  Current Equipment:Rollator, shower chair  Payment Source: Medicare and Medicaid   Concerns or Barriers to Discharge: Patient would benefit greatly from [facility] stay. Post acute provider list with quality measures, geographic area and applicable managed care information provided. Questions regarding selection process answered: None    Teach Back Method used with Latanya regarding care plan and discharge planning. Patient verbalize understanding of the plan of care and contribute to goal setting. Patient goals, treatment preferences and discharge plan: Return to home with help from stanley Taylor and Passport services. Attempted to call stanley Taylor and was not able to reach her or leave a message. Minerva Bhakta is her Passport CM, was unable to reach her as well, left a message to call SW back.      Electronically signed by MIGEL Solis on 8/31/2022 at 4:10 PM

## 2022-08-31 NOTE — CARE COORDINATION
8/31/22, 1:43 PM EDT    DISCHARGE PLANNING EVALUATION    Attempted to see patient this afternoon, she is sleeping heavily at this time. Will attempt later. Update 3:11 PM- Patient is still sleeping, woke her and she asked that SW \"come bother her later\".

## 2022-08-31 NOTE — PROGRESS NOTES
Patients niece Neftali Eis at bedside, asking for update. RN educated family that we are unable to provide information at this time.

## 2022-08-31 NOTE — H&P
[address] [place] [address]                              HISTORY AND PHYSICAL    PATIENT NAME: Gabby Olivera                      :        1933  MED REC NO:   [id number]                           ROOM:       0025  ACCOUNT NO:   [de-identified]                           ADMIT DATE: 2022  PROVIDER:     Bernadette Qureshi. Sabrina Gamez M.D.    [address]:  The patient is an 80-year-old female, came in by EMS  from the ophthalmology clinic Dr. Renea Caro, because of hypotension,  lethargy and dizziness. HISTORY OF PRESENT ILLNESS:  The patient is an 80-year-old female who is  known to me through the office being her primary care provider. The  patient had known history of breast cancer with metastasis to the liver  and the brain and had craniotomy x3 for debulking of the tumor, the last  time that she had craniotomy was 2022 by Dr. Kareen Carbajal here at [facility]. However, the brain tumor recurred from time to time and currently  the patient just recently finished radiation treatment yesterday for the  metastatic breast cancer to the brain. The patient also had history of  temporal arteritis and went to Dr. Elroy Yoder office for evaluation as the  patient is right eye blind, had right eye enucleation in the past.  The  patient's eye was got dilated and the patient developed dizziness,  lethargy, hypotension in Dr. Elroy Yoder office, for which the patient was  then brought by the EMS. The patient's left eye was dilated in Dr. Elroy Yoder office. The patient's niece, Leandro Puga, I called her to light us,  the patient had been confused, previously had been living with her at  her home, and states that the patient had been confused for several  days, especially for the past three days had been coughing from time to  time, however, no fever, no shortness of breath. The patient was then  evaluated in the emergency room.   Had a CAT scan and x-ray of the chest,  CAT scan of the brain continued to show edema and the brain mass with  associated swelling. The patient's chest x-ray showed a right-sided  pneumonia. The patient was hence admitted. REVIEW OF SYSTEMS:  The patient had no fever. Positive headache. Positive right eye blindness with enucleation. Had episodes of  dizziness, hypotension, lethargy. The patient had been coughing. No  chest pain. No nausea, no vomiting, no abdominal pain, diarrhea or  constipation. Positive weakness. No paralysis. The rest of the review  of systems is negative. PAST MEDICAL HISTORY:  The patient has history of temporal arteritis  with right eye blindness/enucleation, has anxiety, has breast cancer  with metastasis to liver and the brain, had degenerative cervical spine,  GERD, craniotomy x3, has been on chemotherapy, hypercholesterolemia,  hypoestrogenism, hypothyroidism, chronic back pain, had history of  personal cardiac dysrhythmia, history of atrial fibrillation, vertebral  artery occlusion on the left, and vitamin D deficiency. PAST SURGICAL HISTORY:  Includes craniotomy x3, had colostomy in the  past, cholecystectomy, cataract surgery, right eye enucleation,  hysterectomy, subclavian and pulmonary shunt. FAMILY HISTORY:  Significant for breast cancer, colon cancer, and lung  cancer. His son  of lung cancer. SOCIAL HISTORY:  The patient lives with the niece at this time at her  home. No smoking or substance abuse; however, the patient had history  of smoking and quit years ago. No alcohol or substance abuse. PHYSICAL EXAMINATION:  GENERAL:  When I was seeing the patient, the patient is alert, however,  has been confused, not in distress. VITAL SIGNS:  Blood pressure is 102/51, temperature 97.9, respirations  18, pulse 64, saturation 91%. HEAD:  Normocephalic, atraumatic. The right eye is enucleated, she is  now with artificial eye. Left eye showed reactive pupils.   No otorrhea  or

## 2022-08-31 NOTE — PLAN OF CARE
Problem: Discharge Planning  Goal: Discharge to home or other facility with appropriate resources  8/31/2022 1610 by MIGEL Mares  Outcome: Progressing    Consult received. Please see SW note dated 8/31.

## 2022-08-31 NOTE — PROGRESS NOTES
08/31/22 1552   Encounter Summary   Encounter Overview/Reason  Initial Encounter   Service Provided For: Patient   Referral/Consult From: [employer]   Support System Unknown   Last Encounter  08/31/22   Complexity of Encounter Low   Begin Time 1535   End Time  1550   Total Time Calculated 15 min   Assessment/Intervention/Outcome   Assessment Unable to assess; Coping   Intervention Active listening;Empowerment;Sustaining Presence/Ministry of presence   Outcome Comfort;Peace     Notes from Dr. Anson Batista:    Jose G Paul was barely able to communicate verbally. This was my second time trying to meet the patient. She was still able to join me I prayer. She agreed to prayer. She is related to the [facility]. The  came to see her. INTERVENTION    I talked to her for a while. I asked permission to pray, to which she readily agreed. I prayed and offered words of comfort for her. OUTCOME    P was able to affirm the prayer by saying \"Amen. \"     CARE  She would need continuous conversation and prayer.

## 2022-08-31 NOTE — PROGRESS NOTES
Attempted to complete admission. Patient dosing off frequently and having difficulty staying awake. Bedside nurse states to wait until later and that she will message virtual nurse when patient is more awake/ready for admission.

## 2022-09-01 ENCOUNTER — APPOINTMENT (OUTPATIENT)
Dept: INTERVENTIONAL RADIOLOGY/VASCULAR | Age: 87
DRG: 177 | End: 2022-09-01
Payer: MEDICARE

## 2022-09-01 ENCOUNTER — APPOINTMENT (OUTPATIENT)
Dept: CT IMAGING | Age: 87
DRG: 177 | End: 2022-09-01
Payer: MEDICARE

## 2022-09-01 ENCOUNTER — APPOINTMENT (OUTPATIENT)
Dept: GENERAL RADIOLOGY | Age: 87
DRG: 177 | End: 2022-09-01
Payer: MEDICARE

## 2022-09-01 LAB — VANCOMYCIN RANDOM: 11 UG/ML (ref 0.1–39.9)

## 2022-09-01 PROCEDURE — 6360000002 HC RX W HCPCS: Performed by: EMERGENCY MEDICINE

## 2022-09-01 PROCEDURE — 94760 N-INVAS EAR/PLS OXIMETRY 1: CPT

## 2022-09-01 PROCEDURE — 2580000003 HC RX 258: Performed by: FAMILY MEDICINE

## 2022-09-01 PROCEDURE — 97110 THERAPEUTIC EXERCISES: CPT

## 2022-09-01 PROCEDURE — 36415 COLL VENOUS BLD VENIPUNCTURE: CPT

## 2022-09-01 PROCEDURE — 6370000000 HC RX 637 (ALT 250 FOR IP): Performed by: FAMILY MEDICINE

## 2022-09-01 PROCEDURE — 71045 X-RAY EXAM CHEST 1 VIEW: CPT

## 2022-09-01 PROCEDURE — 6360000002 HC RX W HCPCS: Performed by: FAMILY MEDICINE

## 2022-09-01 PROCEDURE — 71275 CT ANGIOGRAPHY CHEST: CPT

## 2022-09-01 PROCEDURE — 97530 THERAPEUTIC ACTIVITIES: CPT

## 2022-09-01 PROCEDURE — 93970 EXTREMITY STUDY: CPT

## 2022-09-01 PROCEDURE — 2700000000 HC OXYGEN THERAPY PER DAY

## 2022-09-01 PROCEDURE — 80202 ASSAY OF VANCOMYCIN: CPT

## 2022-09-01 PROCEDURE — 94640 AIRWAY INHALATION TREATMENT: CPT

## 2022-09-01 PROCEDURE — 6360000004 HC RX CONTRAST MEDICATION: Performed by: EMERGENCY MEDICINE

## 2022-09-01 PROCEDURE — 97535 SELF CARE MNGMENT TRAINING: CPT

## 2022-09-01 PROCEDURE — 2060000000 HC ICU INTERMEDIATE R&B

## 2022-09-01 PROCEDURE — 6370000000 HC RX 637 (ALT 250 FOR IP): Performed by: EMERGENCY MEDICINE

## 2022-09-01 PROCEDURE — 97166 OT EVAL MOD COMPLEX 45 MIN: CPT

## 2022-09-01 RX ORDER — ENOXAPARIN SODIUM 100 MG/ML
1 INJECTION SUBCUTANEOUS EVERY 12 HOURS
Status: DISCONTINUED | OUTPATIENT
Start: 2022-09-01 | End: 2022-09-08 | Stop reason: HOSPADM

## 2022-09-01 RX ORDER — LEVOFLOXACIN 500 MG/1
500 TABLET, FILM COATED ORAL DAILY
Status: COMPLETED | OUTPATIENT
Start: 2022-09-01 | End: 2022-09-08

## 2022-09-01 RX ORDER — MORPHINE SULFATE 2 MG/ML
2 INJECTION, SOLUTION INTRAMUSCULAR; INTRAVENOUS
Status: DISCONTINUED | OUTPATIENT
Start: 2022-09-01 | End: 2022-09-08 | Stop reason: HOSPADM

## 2022-09-01 RX ORDER — HYDROCODONE BITARTRATE AND ACETAMINOPHEN 5; 325 MG/1; MG/1
1 TABLET ORAL EVERY 4 HOURS PRN
Status: DISCONTINUED | OUTPATIENT
Start: 2022-09-01 | End: 2022-09-08 | Stop reason: HOSPADM

## 2022-09-01 RX ADMIN — ALBUTEROL SULFATE 2.5 MG: 2.5 SOLUTION RESPIRATORY (INHALATION) at 20:49

## 2022-09-01 RX ADMIN — POLYVINYL ALCOHOL 2 DROP: 14 SOLUTION/ DROPS OPHTHALMIC at 08:20

## 2022-09-01 RX ADMIN — LEVETIRACETAM 500 MG: 500 TABLET, FILM COATED ORAL at 08:21

## 2022-09-01 RX ADMIN — CEFEPIME 2000 MG: 2 INJECTION, POWDER, FOR SOLUTION INTRAVENOUS at 18:39

## 2022-09-01 RX ADMIN — Medication 3 MG: at 22:19

## 2022-09-01 RX ADMIN — CETIRIZINE HYDROCHLORIDE 10 MG: 10 TABLET, FILM COATED ORAL at 08:21

## 2022-09-01 RX ADMIN — ALBUTEROL SULFATE 2.5 MG: 2.5 SOLUTION RESPIRATORY (INHALATION) at 17:31

## 2022-09-01 RX ADMIN — SERTRALINE HYDROCHLORIDE 50 MG: 50 TABLET ORAL at 08:20

## 2022-09-01 RX ADMIN — ISOSORBIDE MONONITRATE 30 MG: 30 TABLET, EXTENDED RELEASE ORAL at 08:21

## 2022-09-01 RX ADMIN — LEVOTHYROXINE SODIUM 100 MCG: 0.1 TABLET ORAL at 08:21

## 2022-09-01 RX ADMIN — METOPROLOL TARTRATE 12.5 MG: 25 TABLET, FILM COATED ORAL at 08:21

## 2022-09-01 RX ADMIN — PANTOPRAZOLE SODIUM 40 MG: 40 TABLET, DELAYED RELEASE ORAL at 08:21

## 2022-09-01 RX ADMIN — CEFEPIME 2000 MG: 2 INJECTION, POWDER, FOR SOLUTION INTRAVENOUS at 06:03

## 2022-09-01 RX ADMIN — LEVOFLOXACIN 500 MG: 500 TABLET, FILM COATED ORAL at 16:38

## 2022-09-01 RX ADMIN — LEVETIRACETAM 500 MG: 500 TABLET, FILM COATED ORAL at 22:18

## 2022-09-01 RX ADMIN — MIDODRINE HYDROCHLORIDE 2.5 MG: 2.5 TABLET ORAL at 16:38

## 2022-09-01 RX ADMIN — ENOXAPARIN SODIUM 70 MG: 100 INJECTION SUBCUTANEOUS at 16:38

## 2022-09-01 RX ADMIN — IOPAMIDOL 80 ML: 755 INJECTION, SOLUTION INTRAVENOUS at 17:04

## 2022-09-01 RX ADMIN — ALBUTEROL SULFATE 2.5 MG: 2.5 SOLUTION RESPIRATORY (INHALATION) at 13:43

## 2022-09-01 RX ADMIN — POLYVINYL ALCOHOL 2 DROP: 14 SOLUTION/ DROPS OPHTHALMIC at 16:38

## 2022-09-01 RX ADMIN — MIDODRINE HYDROCHLORIDE 2.5 MG: 2.5 TABLET ORAL at 08:20

## 2022-09-01 RX ADMIN — AMIODARONE HYDROCHLORIDE 200 MG: 200 TABLET ORAL at 08:21

## 2022-09-01 RX ADMIN — METOPROLOL TARTRATE 12.5 MG: 25 TABLET, FILM COATED ORAL at 22:18

## 2022-09-01 RX ADMIN — SODIUM CHLORIDE, PRESERVATIVE FREE 10 ML: 5 INJECTION INTRAVENOUS at 22:19

## 2022-09-01 RX ADMIN — ALBUTEROL SULFATE 2.5 MG: 2.5 SOLUTION RESPIRATORY (INHALATION) at 08:22

## 2022-09-01 RX ADMIN — SODIUM CHLORIDE, PRESERVATIVE FREE 10 ML: 5 INJECTION INTRAVENOUS at 08:20

## 2022-09-01 RX ADMIN — SERTRALINE HYDROCHLORIDE 50 MG: 50 TABLET ORAL at 22:18

## 2022-09-01 RX ADMIN — MIDODRINE HYDROCHLORIDE 2.5 MG: 2.5 TABLET ORAL at 11:57

## 2022-09-01 RX ADMIN — POLYVINYL ALCOHOL 2 DROP: 14 SOLUTION/ DROPS OPHTHALMIC at 22:19

## 2022-09-01 RX ADMIN — SODIUM CHLORIDE: 9 INJECTION, SOLUTION INTRAVENOUS at 06:02

## 2022-09-01 RX ADMIN — MORPHINE SULFATE 2 MG: 2 INJECTION, SOLUTION INTRAMUSCULAR; INTRAVENOUS at 22:18

## 2022-09-01 ASSESSMENT — PAIN SCALES - GENERAL
PAINLEVEL_OUTOF10: 0
PAINLEVEL_OUTOF10: 8

## 2022-09-01 ASSESSMENT — PAIN SCALES - WONG BAKER
WONGBAKER_NUMERICALRESPONSE: 0
WONGBAKER_NUMERICALRESPONSE: 0

## 2022-09-01 ASSESSMENT — PAIN DESCRIPTION - FREQUENCY: FREQUENCY: INTERMITTENT

## 2022-09-01 ASSESSMENT — PAIN DESCRIPTION - DESCRIPTORS: DESCRIPTORS: ACHING

## 2022-09-01 ASSESSMENT — PAIN DESCRIPTION - ONSET: ONSET: ON-GOING

## 2022-09-01 ASSESSMENT — PAIN DESCRIPTION - LOCATION: LOCATION: HEAD

## 2022-09-01 ASSESSMENT — PAIN DESCRIPTION - PAIN TYPE: TYPE: OTHER (COMMENT)

## 2022-09-01 NOTE — CARE COORDINATION
9/1/22, 1:34 PM EDT    DISCHARGE ON GOING EVALUATION    [facility] day: 2  Location: [facility] Reason for admit: Pneumonia [J18.9]   Procedure:    8/30: CXR:  1. Right upper lobe pneumonia. 2. Likely small left pleural effusion. 3. Cardiomegaly, stable. 8/30: CT head:  1. No acute intracranial findings. No intracranial hemorrhage. 2. Persistent areas of vasogenic edema along the right parietal temporal    regions with underlying cystic changes. Persistent local mass-effect upon    the posterior and temporal horns of the right lateral ventricle. Decreased    associated right to left midline shift measuring 3 mm     Barriers to Discharge: PCP following. Pneumonia. IVF. IV cefepime. IV vanc. Nebs. 3L NC.   PCP: So Fortune MD  Readmission Risk Score: 32.5%  Patient Goals/Plan/Treatment Preferences: Teressa Foster is from home with [employer] services (2hrs daily Mon-Fri). SW has left messages to discuss discharge needs with family.

## 2022-09-01 NOTE — FLOWSHEET NOTE
09/01/22 1043   Safe Environment   Safety Measures Other (comment)  (Virtual Safety Round Complete)   Virtual Nurse rounds, staff responded to audio that they were with the patient, camera not turned on

## 2022-09-01 NOTE — CARE COORDINATION
9/1/22, 10:36 AM EDT  DISCHARGE PLANNING EVALUATION    SW attempted to call Warren Rilye but she did not answer and SW was not able to leave a voicemail. SW called Montrell Vivar CM, and left a voicemail asking for a call back. 9/1/22, 11:03 AM EDT  DISCHARGE PLANNING EVALUATION    SW received a call from Montrell Eric CM, and she confirmed that patient is current with their services and have an ERS, meals, RN weekly from [facility] and aide [facility] 2 hours Monday through Friday. Jose Luis Kowalski explained that SW may have better luck calling home number and getting in contact with Warren Riley. SW called patient's home phone number and left voicemail for Wileylaurie Riley. SW called [facility] confirmed current with aide and RN services.
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
## 2022-09-01 NOTE — PROGRESS NOTES
5- Dr. Kofi Holder perfect served critical result of VL doppler. Waiting response. 0- Dr. Kofi Holder called at office. Telephone order placed for CTA chest w/wo contrast and Lovenox 1mg/kg BID. This RN read order back to physician and order verified CT called and updated as well.

## 2022-09-01 NOTE — PROGRESS NOTES
Sandra Cop[facility] 60  INPATIENT OCCUPATIONAL THERAPY  [facility] ICU STEPDOWN TELEMETRY 4K  EVALUATION    Time:    Time In: 902  Time Out: 9314  Timed Code Treatment Minutes: 25 Minutes  Minutes: 35          Date: 2022  Patient Name: Amanda Villalba,   Gender: female      MRN: [id number]  : 1933  (80 y.o.)  Referring Practitioner: Gee Huggins MD  Diagnosis: Pneumonia  Additional Pertinent Hx: Per ER note on 2022:89 y.o. female who presents to the emergency department from the eye doctor via EMS for evaluation of hypotension and lethargy. Family member states that she was at the eye doctor today to have the pressure tested in her right eye. When they dilated her eyes she became dizzy. They checked her blood pressure and it was low. The member states that she recently had radiation therapy for her metastatic breast cancer. They were told to expect her to be very tired and lethargic secondary to the radiation. Had brain surgery x 3 but brain tumors recurs. Latest MRI 22 showed brain mets bigger. Her Niece Marizol Toussaint who is living with her at home noted she had been confused x 3 days , had cough from time to time but no fever.     Restrictions/Precautions:  Restrictions/Precautions: Fall Risk  Position Activity Restriction  Other position/activity restrictions: 3L O2 on ; monitor BP    Subjective  Chart Reviewed: Yes, Orders, Progress Notes, History and Physical  Patient assessed for rehabilitation services?: Yes  Family / Caregiver Present: No    Subjective: drowsy, but agreeable    Pain: no number given/10: pain in chest at one point-nurse notified    Vitals: Orthostatic Blood Pressure: Supine: 91/52, Sittin/49, Standing: NT  O2 sat 93% on 3L O2    Social/Functional History:  Lives With:  (niece)  Type of Home: House  Home Layout: One level  Home Access: Level entry  Home Equipment: Albino Hawks, 4 wheeled, Oxygen           ADL Assistance: Needs assistance  Ambulation Assistance: Independent  Transfer Assistance: Independent          Additional Comments: Per PT eval: Pt states ambulates with rollator at home, 2 L O2 continuously, niece assists with ADL's. Nurse reports 9/1 that niece is not with Pt 24 hr a day. VISION: blind per chart, Pt reports able to see call light    HEARING:  WFL    COGNITION: Decreased Safety Awareness    RANGE OF MOTION:  Bilateral Upper Extremity:  WFL    STRENGTH:  BUE appears 3/5 grossly    SENSATION:   WFL    ADL:   Bathing: Maximum Assistance. For BADL while seated EOB, finished pericare in supine  Upper Extremity Dressing: Maximum Assistance. For [facility] gown  Lower Extremity Dressing: Dependent. For adjusting slipper socks  Toileting: Dependent. For clean up after incontinent of urine in bed .  **noted with BADL that Pt has significant edema in L thigh--nurse notified      BALANCE:  Sitting Balance:  Minimal Assistance. -CGA; Pt leaning to R side or posteriorly especially with fatigue . Pt sat EOB x 25 min    BED MOBILITY:  Supine to Sit: Maximum Assistance, X 1    Sit to Supine: Maximum Assistance, X 1      TRANSFERS:  Pt declined trying to stand after fatigue from EOB activity        Activity Tolerance:  Patient tolerance of  treatment: fair. Assessment:  Assessment: Pt demo decreased indep with functional mobility & ADLs over PLOF s/p admission with pneumonia. Continued OT recommended to faciliate improved endurance, strength, & balance as well as educate Pt on adaptive strategies & safety for returning to ADLs within discharge environment. Performance deficits / Impairments: Decreased functional mobility , Decreased endurance, Decreased ADL status, Decreased balance, Decreased safe awareness, Decreased vision/visual deficit  Prognosis: Fair  REQUIRES OT FOLLOW-UP: Yes  Decision Making: Medium Complexity    Treatment Initiated: Treatment and education initiated within context of evaluation.   Evaluation time included review of current medical information, gathering information related to past medical, social and functional history, completion of standardized testing, formal and informal observation of tasks, assessment of data and development of plan of care and goals. Treatment time included skilled education and facilitation of tasks to increase safety and independence with ADL's for improved functional independence and quality of life. Discharge Recommendations:  [address] (Pt is not safe to be discharge home without 24hr A)    Patient Education:     Patient Education  Education Given To: Patient  Education Provided: Role of Therapy, Plan of Care  Education Method: Verbal, Demonstration  Barriers to Learning: Vision  Education Outcome: Continued education needed    Equipment Recommendations: Other: Monitor pending progress    Plan:  Times per Week: 5x  Current Treatment Recommendations: Functional mobility training, Balance training, Self-Care / ADL, Strengthening, Endurance training, Patient/Caregiver education & training, Safety education & training. See long-term goal time frame for expected duration of plan of care. If no long-term goals established, a short length of stay is anticipated. Goals:  Patient goals : Pt did not report  Short Term Goals  Time Frame for Short term goals: until discharge  Short Term Goal 1: Pt will tolerate sitting EOB 8-10 min with 0>CGA for increased ease of EOB ADL tasks  Short Term Goal 2: Pt will complete grooming tasks with min A  Short Term Goal 3: Pt will tolerate further assessment of functional t/fs by OTR when appropriate  Long Term Goals  Time Frame for Long term goals : No LTG set d/t short ELOS         Following session, patient left in safe position with all fall risk precautions in place.

## 2022-09-01 NOTE — PROGRESS NOTES
Comprehensive Nutrition Assessment    Type and Reason for Visit:  Initial, Positive Nutrition Screen, Wound (unplanned wt loss, wounds)    Nutrition Recommendations/Plan:   Consider SLP swallow eval to insure swallowing safety as appropriate. Monitor intake, labs, & wt. Consider MVI as appropriate. Revisit as able. Malnutrition Assessment:  Malnutrition Status:  Insufficient data (09/01/22 1534)    Context:  Acute Illness     Findings of the 6 clinical characteristics of malnutrition:  Energy Intake:  Mild decrease in energy intake (Comment) (75% or less atleast1 day)  Weight Loss:     no recent significant wt loss   Body Fat Loss:  Unable to assess     Muscle Mass Loss:  Unable to assess    Fluid Accumulation:  Mild Extremities   Strength:  Not Performed    Nutrition Assessment:     Pt. nutritionally compromised AEB wounds. At risk for further nutrition compromise r/t increased nutrient needs for wound healing, admit  with rt sided pneumonia likely aspiration, breast cancer with mets to liver & brain ( craniotomy x 3 for debulking of tumor, last craniotomy was 5/11/22, and underlying medical condition (hx chemotherapy, blind rt eye, just finished radiation therapy (8/29), GERD, Covid 19 (1/21/22, hypercholesterolemia, sigmoid diverticulosis, steroid induced hyperglycemia, SVT, Vitamin D Deficiency). Nutrition Related Findings:  Pt. Report/Treatments/Miscellaneous: Pt N/A. RN mentions pt went to ultrasound. RN mentions pt needs to take pills crushed with applesauce & mentions will ask physician for SLP swallow eval. RN mentions intake has been very poor.     GI Status: No BM LOS day 2  Pertinent Labs: (8/30) AST 42, Pro BNP 5499. (8/22) , (8/31) Hemoglobin 8.8  Pertinent Meds: Zofran, Glycolax     Wound Type:  (stage II sacrum, stage II buttocks left inner, stage II buttocks rt)       Current Nutrition Intake & Therapies:    Average Meal Intake: 1-25%, 26-50%, 51-75%  Average Supplements Intake: None Ordered  ADULT DIET; Regular; 4 carb choices (60 gm/meal)    Anthropometric Measures:  Height: 5' (152.4 cm)  Ideal Body Weight (IBW): 100 lbs (45 kg)    Admission Body Weight: 144 lb 6.4 oz (65.5 kg) ((8/31) + 1 pitting RLE & + 2 pitting LLE edema)  Current Body Weight: 145 lb 3.2 oz (65.9 kg) ((9/1) + 1 RLE, + 2 pitting LLE edema),   IBW. Current BMI (kg/m2): 28.4  Usual Body Weight:  (per EMR: (8/25) 134#, (8/21) 134# 14.4oz bedscale, (8/18) 130# 1.1 oz stand scale, (8/4) 135#)                       BMI Categories: Overweight (BMI 25.0-29. 9)    Estimated Daily Nutrient Needs:  Energy Requirements Based On: Kcal/kg  Weight Used for Energy Requirements:  ((9/1))  Energy (kcal/day): 1638kcals ( ~25kcals/kgm)     Protein (g/day): 63-90 grams (1.4-2 grams protein/kgm IBW)       Nutrition Diagnosis:   Increased nutrient needs related to increase demand for energy/nutrients as evidenced by wounds    Nutrition Interventions:   Food and/or Nutrient Delivery:  (Diet per physician/SLP)  Nutrition Education/Counseling: Education not appropriate  Coordination of Nutrition Care: Continue to monitor while inpatient       Goals:     Goals: PO intake 50% or greater, by next RD assessment (as appropriate)       Nutrition Monitoring and Evaluation:   Behavioral-Environmental Outcomes: None Identified  Food/Nutrient Intake Outcomes: Diet Advancement/Tolerance, Food and Nutrient Intake, Supplement Intake, Vitamin/Mineral Intake  Physical Signs/Symptoms Outcomes: Biochemical Data, Chewing or Swallowing, GI Status, Fluid Status or Edema, Hemodynamic Status, Nutrition Focused Physical Findings, Skin, Weight    Discharge Planning:     Too soon to determine     Joel Morris RD, LD  Contact: [phone]

## 2022-09-01 NOTE — PLAN OF CARE
needed   Assess and instruct to report shortness of breath or any respiratory difficulty   Respiratory therapy support as indicated  Goal: Clear lung sounds  8/31/2022 2234 by Tricia Orr RCP  Outcome: Progressing     Problem: ABCDS Injury Assessment  Goal: Absence of physical injury  Outcome: Progressing  Flowsheets (Taken 9/1/2022 0443)  Absence of Physical Injury: Implement safety measures based on patient assessment     Problem: Skin/Tissue Integrity  Goal: Absence of new skin breakdown  Description: 1. Monitor for areas of redness and/or skin breakdown  2. Assess vascular access sites hourly  3. Every 4-6 hours minimum:  Change oxygen saturation probe site  4. Every 4-6 hours:  If on nasal continuous positive airway pressure, respiratory therapy assess nares and determine need for appliance change or resting period.   Outcome: Progressing     Problem: Safety - Adult  Goal: Free from fall injury  Outcome: Progressing  Flowsheets (Taken 9/1/2022 0443)  Free From Fall Injury:   Instruct family/caregiver on patient safety   Based on caregiver fall risk screen, instruct family/caregiver to ask for assistance with transferring infant if caregiver noted to have fall risk factors

## 2022-09-01 NOTE — PLAN OF CARE
Problem: Respiratory - Adult  Goal: Clear lung sounds  9/1/2022 0826 by Mary Alvarez RCP  Outcome: Progressing  Note: Albuterol to maintain clear breath sounds.

## 2022-09-01 NOTE — PLAN OF CARE
Problem: Discharge Planning  Goal: Discharge to home or other facility with appropriate resources  9/1/2022 1054 by Jose Becker RN  Outcome: Progressing  Flowsheets (Taken 9/1/2022 0919)  Discharge to home or other facility with appropriate resources:   Identify barriers to discharge with patient and caregiver   Arrange for needed discharge resources and transportation as appropriate   Identify discharge learning needs (meds, wound care, etc)   Refer to discharge planning if patient needs post-hospital services based on physician order or complex needs related to functional status, cognitive ability or social support system   Arrange for interpreters to assist at discharge as needed  0/7/0514 9761 by Kal Zacarias RN  Outcome: Progressing  Flowsheets  Taken 0/72/8050 8665 by Kal Zacarias RN  Discharge to home or other facility with appropriate resources:   Identify barriers to discharge with patient and caregiver   Arrange for needed discharge resources and transportation as appropriate   Identify discharge learning needs (meds, wound care, etc)   Arrange for interpreters to assist at discharge as needed   Refer to discharge planning if patient needs post-hospital services based on physician order or complex needs related to functional status, cognitive ability or social support system  Taken 8/31/2022 1709 by Clemencia Chu RN  Discharge to home or other facility with appropriate resources:   Identify barriers to discharge with patient and caregiver   Identify discharge learning needs (meds, wound care, etc)   Refer to discharge planning if patient needs post-hospital services based on physician order or complex needs related to functional status, cognitive ability or social support system     Problem: Respiratory - Adult  Goal: Clear lung sounds  9/1/2022 0826 by Casa Billingsley RCP  Outcome: Progressing  Note: Albuterol to maintain clear breath sounds.   8/31/2022 2234 by Ramos Leach RCP  Outcome: Progressing     Problem: ABCDS Injury Assessment  Goal: Absence of physical injury  9/1/2022 1054 by Jose Becker RN  Outcome: Progressing  Flowsheets (Taken 9/1/2022 1049)  Absence of Physical Injury: Implement safety measures based on patient assessment  8/0/8810 4897 by Kal Zacarias RN  Outcome: Progressing  Flowsheets (Taken 9/1/2022 0443)  Absence of Physical Injury: Implement safety measures based on patient assessment     Problem: Skin/Tissue Integrity  Goal: Absence of new skin breakdown  Description: 1. Monitor for areas of redness and/or skin breakdown  2. Assess vascular access sites hourly  3. Every 4-6 hours minimum:  Change oxygen saturation probe site  4. Every 4-6 hours:  If on nasal continuous positive airway pressure, respiratory therapy assess nares and determine need for appliance change or resting period. 9/1/2022 1054 by Jose Becker RN  Outcome: Progressing  Note: During shift patient turned every 2 hours, pillow support utilized. 7/9/3426 1844 by Kal Zacarias RN  Outcome: Progressing     Problem: Safety - Adult  Goal: Free from fall injury  9/1/2022 1054 by Jose Becker RN  Outcome: Progressing  Flowsheets (Taken 9/1/2022 1049)  Free From Fall Injury:   Efrain Santoro family/caregiver on patient safety   Based on caregiver fall risk screen, instruct family/caregiver to ask for assistance with transferring infant if caregiver noted to have fall risk factors  7/5/5672 3401 by Kal Zacarias RN  Outcome: Progressing  Flowsheets (Taken 9/1/2022 0443)  Free From Fall Injury:   Instruct family/caregiver on patient safety   Based on caregiver fall risk screen, instruct family/caregiver to ask for assistance with transferring infant if caregiver noted to have fall risk factors   Care plan reviewed with patient. Patient verbalized understanding of the plan of care and contribute to goal setting.

## 2022-09-01 NOTE — PLAN OF CARE
Problem: Respiratory - Adult  Goal: Achieves optimal ventilation and oxygenation  8/31/2022 2234 by So Valentino RCP  Outcome: Progressing     Problem: Respiratory - Adult  Goal: Clear lung sounds  8/31/2022 2234 by So Valentino RCP  Outcome: Progressing   Mutually agreed upon goals.

## 2022-09-01 NOTE — PROGRESS NOTES
[address] [facility] PHYSICAL THERAPY  DAILY NOTE  STRZ ICU STEPDOWN TELEMETRY 4K - 4K-25/025-A    Time In: 1125  Time Out: 1148  Timed Code Treatment Minutes: 23 Minutes  Minutes: 23          Date: 2022  Patient Name: Amanda Villalba,  Gender:  female        MRN: [id number]  : 1933  (80 y.o.)     Referring Practitioner: Prosper Ferguson MD  Diagnosis: Pneumonia  Additional Pertinent Hx: Admitted due to dizziness, hypotension lethargic while being checked at opthalmology office [facility]. Her eyes was dilated. She just finished radiation on her brain yesterday. 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 Had brain surgery x 3 but brain tumors recurs. Latest MRI 22 showed brain mets bigger. Her Niece Marizol Toussaint who is living with her at home noted she had been confused x 3 days , had cough from time to time but no fever. Prior Level of Function:  Lives With:  (niece)  Type of Home: House  Home Layout: One level  Home Access: Level entry  Home Equipment: Albino Hawks, 4 wheeled, Oxygen        ADL Assistance: Needs assistance  Ambulation Assistance: Independent  Transfer Assistance: Independent  Additional Comments: Per PT eval: Pt states ambulates with rollator at home, 2 L O2 continuously, niece assists with ADL's. Nurse reports  that niece is not with Pt 24 hr a day. Restrictions/Precautions:  Restrictions/Precautions: Fall Risk  Position Activity Restriction  Other position/activity restrictions: 3L O2 on ; monitor BP     SUBJECTIVE: RN approved session and in to assist during session. Patient in bed at arrival and agreeable to therapy. Patient is frequently confused throughout session required frequent assistance to reorient. Patient's niece present throughout session and supportive. PAIN: Yes, B UE however did not rate. Vitals: Oxygen: 90's on 3L with all activity.      OBJECTIVE:  Bed Mobility:  Rolling to Left: Maximum Assistance, X 1, with head of bed flat, with verbal cues    Rolling to Right: Maximum Assistance, X 1, with head of bed flat, with verbal cues , with increased time for completion   Supine to Sit: Maximum Assistance, X 1, with head of bed flat, with verbal cues   Sit to Supine: Maximum Assistance, X 1, with verbal cues    Scooting: Maximum Assistance, X 2, with verbal cues , with increased time for completion    Transfers:  Not Tested    Ambulation:  Not Tested    Balance:  Static Sitting Balance:  Maximum Assistance initially then progressed to contact guard. Max cues to improve posture being unable to maintain. Patient frequently moved B UE to grab therapist required max cues for hand placement. Patient sat at EOB for about 2.5 minutes prior to requesting to return to bed. Exercise:  Patient was guided in 1 set(s) 10 reps of exercise to both lower extremities. Ankle pumps, Heelslides, and Hip abduction/adduction. Exercises were completed for increased independence with functional mobility. Functional Outcome Measures: Completed  AM-PAC Inpatient Mobility without Stair Climbing Raw Score : 7  AM-PAC Inpatient without Stair Climbing T-Scale Score : 28.66    ASSESSMENT:  Assessment: Patient progressing toward established goals. Activity Tolerance:  Patient tolerance of  treatment: fair. Equipment Recommendations: Other: will continue to assess pending progress  Discharge Recommendations: Continue to assess pending progress, Subacte/Skilled Nursing Facility, and Patient would benefit from continued PT at discharge  Plan: Current Treatment Recommendations: Strengthening, Balance training, Functional mobility training, Endurance training, Safety education & training, Therapeutic activities, Patient/Caregiver education & training  Plan:  (5x GM)    Patient Education  Patient Education: Plan of Care    Goals:  Patient goals : to get better  Short Term Goals  Time Frame for Short term goals: by discharge  Short term goal 1: Pt to transfer supine <--> sit min A to enable pt to get in/out of bed. Short term goal 2: Pt to sit EOB x 10 minutes with SBA for improved upright tolerance in prep for transfers/ambulation. Short term goal 3: PT to assess gait. Long Term Goals  Time Frame for Long term goals : NA due to short length of stay. Following session, patient left in safe position with all fall risk precautions in place.

## 2022-09-02 PROBLEM — I82.403 ACUTE DEEP VEIN THROMBOSIS (DVT) OF BOTH LOWER EXTREMITIES (HCC): Status: ACTIVE | Noted: 2022-09-01

## 2022-09-02 PROBLEM — I26.99 ACUTE PULMONARY EMBOLISM WITHOUT ACUTE COR PULMONALE (HCC): Status: ACTIVE | Noted: 2022-01-01

## 2022-09-02 PROBLEM — R60.9 SWELLING: Status: ACTIVE | Noted: 2022-01-01

## 2022-09-02 PROCEDURE — 2580000003 HC RX 258: Performed by: FAMILY MEDICINE

## 2022-09-02 PROCEDURE — 99223 1ST HOSP IP/OBS HIGH 75: CPT | Performed by: INTERNAL MEDICINE

## 2022-09-02 PROCEDURE — 2700000000 HC OXYGEN THERAPY PER DAY

## 2022-09-02 PROCEDURE — 6370000000 HC RX 637 (ALT 250 FOR IP): Performed by: FAMILY MEDICINE

## 2022-09-02 PROCEDURE — 6370000000 HC RX 637 (ALT 250 FOR IP): Performed by: EMERGENCY MEDICINE

## 2022-09-02 PROCEDURE — 6360000002 HC RX W HCPCS: Performed by: FAMILY MEDICINE

## 2022-09-02 PROCEDURE — 6360000002 HC RX W HCPCS: Performed by: EMERGENCY MEDICINE

## 2022-09-02 PROCEDURE — 94760 N-INVAS EAR/PLS OXIMETRY 1: CPT

## 2022-09-02 PROCEDURE — 2060000000 HC ICU INTERMEDIATE R&B

## 2022-09-02 PROCEDURE — 94640 AIRWAY INHALATION TREATMENT: CPT

## 2022-09-02 RX ADMIN — ISOSORBIDE MONONITRATE 30 MG: 30 TABLET, EXTENDED RELEASE ORAL at 09:02

## 2022-09-02 RX ADMIN — POLYVINYL ALCOHOL 2 DROP: 14 SOLUTION/ DROPS OPHTHALMIC at 21:11

## 2022-09-02 RX ADMIN — LEVETIRACETAM 500 MG: 500 TABLET, FILM COATED ORAL at 09:02

## 2022-09-02 RX ADMIN — ENOXAPARIN SODIUM 70 MG: 100 INJECTION SUBCUTANEOUS at 04:20

## 2022-09-02 RX ADMIN — MIDODRINE HYDROCHLORIDE 2.5 MG: 2.5 TABLET ORAL at 11:44

## 2022-09-02 RX ADMIN — SERTRALINE HYDROCHLORIDE 50 MG: 50 TABLET ORAL at 09:02

## 2022-09-02 RX ADMIN — ENOXAPARIN SODIUM 70 MG: 100 INJECTION SUBCUTANEOUS at 16:25

## 2022-09-02 RX ADMIN — SODIUM CHLORIDE, PRESERVATIVE FREE 10 ML: 5 INJECTION INTRAVENOUS at 21:10

## 2022-09-02 RX ADMIN — LEVETIRACETAM 500 MG: 500 TABLET, FILM COATED ORAL at 21:10

## 2022-09-02 RX ADMIN — SODIUM CHLORIDE: 9 INJECTION, SOLUTION INTRAVENOUS at 15:37

## 2022-09-02 RX ADMIN — ALBUTEROL SULFATE 2.5 MG: 2.5 SOLUTION RESPIRATORY (INHALATION) at 15:35

## 2022-09-02 RX ADMIN — METOPROLOL TARTRATE 12.5 MG: 25 TABLET, FILM COATED ORAL at 21:11

## 2022-09-02 RX ADMIN — ALBUTEROL SULFATE 2.5 MG: 2.5 SOLUTION RESPIRATORY (INHALATION) at 08:03

## 2022-09-02 RX ADMIN — AMIODARONE HYDROCHLORIDE 200 MG: 200 TABLET ORAL at 09:02

## 2022-09-02 RX ADMIN — CEFEPIME 2000 MG: 2 INJECTION, POWDER, FOR SOLUTION INTRAVENOUS at 05:50

## 2022-09-02 RX ADMIN — PANTOPRAZOLE SODIUM 40 MG: 40 TABLET, DELAYED RELEASE ORAL at 09:02

## 2022-09-02 RX ADMIN — SODIUM CHLORIDE, PRESERVATIVE FREE 10 ML: 5 INJECTION INTRAVENOUS at 09:02

## 2022-09-02 RX ADMIN — SODIUM CHLORIDE: 9 INJECTION, SOLUTION INTRAVENOUS at 01:36

## 2022-09-02 RX ADMIN — ALBUTEROL SULFATE 2.5 MG: 2.5 SOLUTION RESPIRATORY (INHALATION) at 20:31

## 2022-09-02 RX ADMIN — POLYVINYL ALCOHOL 2 DROP: 14 SOLUTION/ DROPS OPHTHALMIC at 09:02

## 2022-09-02 RX ADMIN — MIDODRINE HYDROCHLORIDE 2.5 MG: 2.5 TABLET ORAL at 09:02

## 2022-09-02 RX ADMIN — POLYVINYL ALCOHOL 2 DROP: 14 SOLUTION/ DROPS OPHTHALMIC at 14:15

## 2022-09-02 RX ADMIN — CETIRIZINE HYDROCHLORIDE 10 MG: 10 TABLET, FILM COATED ORAL at 09:02

## 2022-09-02 RX ADMIN — SERTRALINE HYDROCHLORIDE 50 MG: 50 TABLET ORAL at 21:10

## 2022-09-02 RX ADMIN — ALBUTEROL SULFATE 2.5 MG: 2.5 SOLUTION RESPIRATORY (INHALATION) at 11:56

## 2022-09-02 RX ADMIN — MIDODRINE HYDROCHLORIDE 2.5 MG: 2.5 TABLET ORAL at 16:25

## 2022-09-02 RX ADMIN — LEVOFLOXACIN 500 MG: 500 TABLET, FILM COATED ORAL at 09:02

## 2022-09-02 RX ADMIN — METOPROLOL TARTRATE 12.5 MG: 25 TABLET, FILM COATED ORAL at 09:02

## 2022-09-02 RX ADMIN — LEVOTHYROXINE SODIUM 100 MCG: 0.1 TABLET ORAL at 09:02

## 2022-09-02 NOTE — PROGRESS NOTES
[facility]  PHYSICAL THERAPY MISSED TREATMENT NOTE  STRZ ICU STEPDOWN TELEMETRY 4K    Date: 2022  Patient Name: Italia Vang        MRN: [id number]   : 1933  (80 y.o.)  Gender: female   Referring Practitioner: Jada Jain MD  Diagnosis: Pneumonia         REASON FOR MISSED TREATMENT:  Hold treatment per RN. Patient is not doing well medically since discovery of DVT in L LE to abdomen. Patient is not tolerating rolling and very sensitive to touch on L LE. Will check back at next available date. [facility] [employer]

## 2022-09-02 NOTE — FLOWSHEET NOTE
09/02/22 8729   Safe Environment   Safety Measures   (virtual safety round complete)     Pt lying in bed resting quietly, call light in reach, family at bedside.
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
## 2022-09-02 NOTE — PLAN OF CARE
Problem: Respiratory - Adult  Goal: Clear lung sounds  Outcome: Progressing   Albuterol aerosol 4 x day--  breath sounds are clear  3 lpm nc sats 93%

## 2022-09-02 NOTE — PLAN OF CARE
1. Monitor for areas of redness and/or skin breakdown  2. Assess vascular access sites hourly  3. Every 4-6 hours minimum:  Change oxygen saturation probe site  4. Every 4-6 hours:  If on nasal continuous positive airway pressure, respiratory therapy assess nares and determine need for appliance change or resting period. 9/2/2022 1231 by Stacy Espinoza RN  Outcome: Progressing  Note: Patient turned every 2 hours, pillow support utilized.    8/9/5471 5244 by Puneet Olvera RN  Outcome: Progressing     Problem: Safety - Adult  Goal: Free from fall injury  9/2/2022 1231 by Stacy Espinoza RN  Outcome: Progressing  Flowsheets (Taken 9/2/2022 1228)  Free From Fall Injury:   Ester Sampson family/caregiver on patient safety   Based on caregiver fall risk screen, instruct family/caregiver to ask for assistance with transferring infant if caregiver noted to have fall risk factors  5/3/1380 5960 by Puneet Olvera RN  Outcome: Progressing  Flowsheets (Taken 9/2/2022 0252)  Free From Fall Injury:   Instruct family/caregiver on patient safety   Based on caregiver fall risk screen, instruct family/caregiver to ask for assistance with transferring infant if caregiver noted to have fall risk factors     Problem: Nutrition Deficit:  Goal: Optimize nutritional status  9/2/2022 1231 by Stacy Espinoza RN  Outcome: Progressing  Flowsheets (Taken 9/2/2022 1231)  Nutrient intake appropriate for improving, restoring, or maintaining nutritional needs:   Assess nutritional status and recommend course of action   Monitor oral intake, labs, and treatment plans   Recommend appropriate diets, oral nutritional supplements, and vitamin/mineral supplements   Order, calculate, and assess calorie counts as needed   Provide specific nutrition education to patient or family as appropriate  9/8/6176 8053 by Puneet Olvera RN  Outcome: Progressing     Problem: Pain  Goal: Verbalizes/displays adequate comfort level or baseline comfort level  9/2/2022 1231 by [place] Rey RN  Outcome: Progressing  Flowsheets (Taken 9/2/2022 1231)  Verbalizes/displays adequate comfort level or baseline comfort level:   Encourage patient to monitor pain and request assistance   Assess pain using appropriate pain scale   Administer analgesics based on type and severity of pain and evaluate response   Implement non-pharmacological measures as appropriate and evaluate response   Consider cultural and social influences on pain and pain management   Notify Licensed Independent Practitioner if interventions unsuccessful or patient reports new pain  7/2/1380 8253 by Adri Villa RN  Outcome: Progressing  Flowsheets (Taken 9/1/2022 2017)  Verbalizes/displays adequate comfort level or baseline comfort level:   Encourage patient to monitor pain and request assistance   Assess pain using appropriate pain scale   Administer analgesics based on type and severity of pain and evaluate response   Implement non-pharmacological measures as appropriate and evaluate response   Consider cultural and social influences on pain and pain management   Notify Licensed Independent Practitioner if interventions unsuccessful or patient reports new pain   Care plan reviewed with patient. Patient verbalize understanding of the plan of care and contribute to goal setting.

## 2022-09-02 NOTE — PLAN OF CARE
Problem: Discharge Planning  Goal: Discharge to home or other facility with appropriate resources  Outcome: Progressing  Flowsheets (Taken 9/1/2022 2017)  Discharge to home or other facility with appropriate resources:   Identify barriers to discharge with patient and caregiver   Arrange for needed discharge resources and transportation as appropriate   Identify discharge learning needs (meds, wound care, etc)   Arrange for interpreters to assist at discharge as needed   Refer to discharge planning if patient needs post-hospital services based on physician order or complex needs related to functional status, cognitive ability or social support system     Problem: ABCDS Injury Assessment  Goal: Absence of physical injury  Outcome: Progressing  Flowsheets (Taken 9/2/2022 0252)  Absence of Physical Injury: Implement safety measures based on patient assessment     Problem: Skin/Tissue Integrity  Goal: Absence of new skin breakdown  Description: 1. Monitor for areas of redness and/or skin breakdown  2. Assess vascular access sites hourly  3. Every 4-6 hours minimum:  Change oxygen saturation probe site  4. Every 4-6 hours:  If on nasal continuous positive airway pressure, respiratory therapy assess nares and determine need for appliance change or resting period.   Outcome: Progressing     Problem: Safety - Adult  Goal: Free from fall injury  Outcome: Progressing  Flowsheets (Taken 9/2/2022 0252)  Free From Fall Injury:   Instruct family/caregiver on patient safety   Based on caregiver fall risk screen, instruct family/caregiver to ask for assistance with transferring infant if caregiver noted to have fall risk factors     Problem: Nutrition Deficit:  Goal: Optimize nutritional status  9/0/0760 4375 by Terry Bartlett RN  Outcome: Progressing  9/1/2022 1555 by Jaziel Gutierrez RD, LD  Flowsheets (Taken 9/1/2022 1555)  Nutrient intake appropriate for improving, restoring, or maintaining nutritional needs:   Assess nutritional status and recommend course of action   Monitor oral intake, labs, and treatment plans   Recommend appropriate diets, oral nutritional supplements, and vitamin/mineral supplements     Problem: Pain  Goal: Verbalizes/displays adequate comfort level or baseline comfort level  Outcome: Progressing

## 2022-09-02 NOTE — CARE COORDINATION
9/2/22, 12:38 PM EDT    DISCHARGE ON GOING EVALUATION    [facility] day: 3  Location: -25/025-A Reason for admit: Pneumonia [J18.9]   Procedure:  8/30: CXR:  1. Right upper lobe pneumonia. 2. Likely small left pleural effusion. 3. Cardiomegaly, stable. 8/30: CT head:  1. No acute intracranial findings. No intracranial hemorrhage. 2. Persistent areas of vasogenic edema along the right parietal temporal    regions with underlying cystic changes. Persistent local mass-effect upon    the posterior and temporal horns of the right lateral ventricle. Decreased    associated right to left midline shift measuring 3 mm   9/1: Bilateral LE doppler:  1. There is extensive acute DVT extending from the left popliteal vein to the common femoral vein. 2. There is also acute DVT in the right common femoral vein and profunda. 9/1: CTA chest:  1. Right upper lobe pulmonary embolism extending from the main pulmonary    artery into the segmental and subsegmental pulmonary arteries. There is    distal groundglass opacities suggesting pulmonary infarct. No evidence of    right heart strain. 2. Multifocal groundglass opacities suggestive of a multifocal pneumonia. 3. Moderate right and small left pleural effusion. 4. Small hiatal hernia. Barriers to Discharge: IVF, IV Cefepime, 2L O2, Pulm following-Lovenox, pain control, PT/OT,   PCP: Shannan Vallejo MD  Readmission Risk Score: 32.3%  Patient Goals/Plan/Treatment Preferences: Return home with niece and Continued care RN and aides. May need SNF/hospice depending. Updated MICHAEL paperwork filed-Manuela RODRIGUEZ.
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
## 2022-09-03 LAB
ANION GAP SERPL CALCULATED.3IONS-SCNC: 11 MEQ/L (ref 8–16)
ANISOCYTOSIS: PRESENT
BASOPHILIA: ABNORMAL
BASOPHILS # BLD: 0.1 %
BASOPHILS ABSOLUTE: 0 THOU/MM3 (ref 0–0.1)
BUN BLDV-MCNC: 9 MG/DL (ref 7–22)
CALCIUM SERPL-MCNC: 8 MG/DL (ref 8.5–10.5)
CHLORIDE BLD-SCNC: 106 MEQ/L (ref 98–111)
CO2: 21 MEQ/L (ref 23–33)
CREAT SERPL-MCNC: 0.4 MG/DL (ref 0.4–1.2)
EOSINOPHIL # BLD: 0.8 %
EOSINOPHILS ABSOLUTE: 0.1 THOU/MM3 (ref 0–0.4)
ERYTHROCYTE [DISTWIDTH] IN BLOOD BY AUTOMATED COUNT: 16.5 % (ref 11.5–14.5)
ERYTHROCYTE [DISTWIDTH] IN BLOOD BY AUTOMATED COUNT: 54.7 FL (ref 35–45)
GFR SERPL CREATININE-BSD FRML MDRD: > 90 ML/MIN/1.73M2
GLUCOSE BLD-MCNC: 92 MG/DL (ref 70–108)
HCT VFR BLD CALC: 29.4 % (ref 37–47)
HEMOGLOBIN: 8.9 GM/DL (ref 12–16)
IMMATURE GRANS (ABS): 0.43 THOU/MM3 (ref 0–0.07)
IMMATURE GRANULOCYTES: 6.1 %
LYMPHOCYTES # BLD: 11.3 %
LYMPHOCYTES ABSOLUTE: 0.8 THOU/MM3 (ref 1–4.8)
MCH RBC QN AUTO: 27.5 PG (ref 26–33)
MCHC RBC AUTO-ENTMCNC: 30.3 GM/DL (ref 32.2–35.5)
MCV RBC AUTO: 90.7 FL (ref 81–99)
MONOCYTES # BLD: 7.2 %
MONOCYTES ABSOLUTE: 0.5 THOU/MM3 (ref 0.4–1.3)
NUCLEATED RED BLOOD CELLS: 0 /100 WBC
PLATELET # BLD: 181 THOU/MM3 (ref 130–400)
PLATELET ESTIMATE: ADEQUATE
PMV BLD AUTO: 10.5 FL (ref 9.4–12.4)
POIKILOCYTES: ABNORMAL
POTASSIUM SERPL-SCNC: 3.2 MEQ/L (ref 3.5–5.2)
RBC # BLD: 3.24 MILL/MM3 (ref 4.2–5.4)
SCAN OF BLOOD SMEAR: NORMAL
SEG NEUTROPHILS: 74.5 %
SEGMENTED NEUTROPHILS ABSOLUTE COUNT: 5.3 THOU/MM3 (ref 1.8–7.7)
SODIUM BLD-SCNC: 138 MEQ/L (ref 135–145)
WBC # BLD: 7.1 THOU/MM3 (ref 4.8–10.8)

## 2022-09-03 PROCEDURE — 80048 BASIC METABOLIC PNL TOTAL CA: CPT

## 2022-09-03 PROCEDURE — 36415 COLL VENOUS BLD VENIPUNCTURE: CPT

## 2022-09-03 PROCEDURE — 85025 COMPLETE CBC W/AUTO DIFF WBC: CPT

## 2022-09-03 PROCEDURE — 2700000000 HC OXYGEN THERAPY PER DAY

## 2022-09-03 PROCEDURE — 94640 AIRWAY INHALATION TREATMENT: CPT

## 2022-09-03 PROCEDURE — 6360000002 HC RX W HCPCS: Performed by: FAMILY MEDICINE

## 2022-09-03 PROCEDURE — 94760 N-INVAS EAR/PLS OXIMETRY 1: CPT

## 2022-09-03 PROCEDURE — 6370000000 HC RX 637 (ALT 250 FOR IP): Performed by: FAMILY MEDICINE

## 2022-09-03 PROCEDURE — 6370000000 HC RX 637 (ALT 250 FOR IP): Performed by: EMERGENCY MEDICINE

## 2022-09-03 PROCEDURE — 2060000000 HC ICU INTERMEDIATE R&B

## 2022-09-03 PROCEDURE — 99232 SBSQ HOSP IP/OBS MODERATE 35: CPT | Performed by: INTERNAL MEDICINE

## 2022-09-03 PROCEDURE — 6360000002 HC RX W HCPCS: Performed by: EMERGENCY MEDICINE

## 2022-09-03 PROCEDURE — 2580000003 HC RX 258: Performed by: FAMILY MEDICINE

## 2022-09-03 RX ADMIN — CEFEPIME 2000 MG: 2 INJECTION, POWDER, FOR SOLUTION INTRAVENOUS at 06:04

## 2022-09-03 RX ADMIN — ALBUTEROL SULFATE 2.5 MG: 2.5 SOLUTION RESPIRATORY (INHALATION) at 13:10

## 2022-09-03 RX ADMIN — MORPHINE SULFATE 2 MG: 2 INJECTION, SOLUTION INTRAMUSCULAR; INTRAVENOUS at 14:18

## 2022-09-03 RX ADMIN — PANTOPRAZOLE SODIUM 40 MG: 40 TABLET, DELAYED RELEASE ORAL at 06:55

## 2022-09-03 RX ADMIN — POLYVINYL ALCOHOL 2 DROP: 14 SOLUTION/ DROPS OPHTHALMIC at 21:36

## 2022-09-03 RX ADMIN — LEVETIRACETAM 500 MG: 500 TABLET, FILM COATED ORAL at 09:37

## 2022-09-03 RX ADMIN — LEVOTHYROXINE SODIUM 100 MCG: 0.1 TABLET ORAL at 06:55

## 2022-09-03 RX ADMIN — SODIUM CHLORIDE: 9 INJECTION, SOLUTION INTRAVENOUS at 17:16

## 2022-09-03 RX ADMIN — SODIUM CHLORIDE: 9 INJECTION, SOLUTION INTRAVENOUS at 05:41

## 2022-09-03 RX ADMIN — AMIODARONE HYDROCHLORIDE 200 MG: 200 TABLET ORAL at 09:35

## 2022-09-03 RX ADMIN — POLYVINYL ALCOHOL 2 DROP: 14 SOLUTION/ DROPS OPHTHALMIC at 12:58

## 2022-09-03 RX ADMIN — LEVETIRACETAM 500 MG: 500 TABLET, FILM COATED ORAL at 21:36

## 2022-09-03 RX ADMIN — ENOXAPARIN SODIUM 70 MG: 100 INJECTION SUBCUTANEOUS at 17:08

## 2022-09-03 RX ADMIN — POLYVINYL ALCOHOL 2 DROP: 14 SOLUTION/ DROPS OPHTHALMIC at 09:38

## 2022-09-03 RX ADMIN — ISOSORBIDE MONONITRATE 30 MG: 30 TABLET, EXTENDED RELEASE ORAL at 09:36

## 2022-09-03 RX ADMIN — SODIUM CHLORIDE, PRESERVATIVE FREE 10 ML: 5 INJECTION INTRAVENOUS at 21:46

## 2022-09-03 RX ADMIN — SERTRALINE HYDROCHLORIDE 50 MG: 50 TABLET ORAL at 21:36

## 2022-09-03 RX ADMIN — MIDODRINE HYDROCHLORIDE 2.5 MG: 2.5 TABLET ORAL at 17:08

## 2022-09-03 RX ADMIN — ALBUTEROL SULFATE 2.5 MG: 2.5 SOLUTION RESPIRATORY (INHALATION) at 09:40

## 2022-09-03 RX ADMIN — CEFEPIME 2000 MG: 2 INJECTION, POWDER, FOR SOLUTION INTRAVENOUS at 18:07

## 2022-09-03 RX ADMIN — ENOXAPARIN SODIUM 70 MG: 100 INJECTION SUBCUTANEOUS at 04:36

## 2022-09-03 RX ADMIN — ALBUTEROL SULFATE 2.5 MG: 2.5 SOLUTION RESPIRATORY (INHALATION) at 20:49

## 2022-09-03 RX ADMIN — ALBUTEROL SULFATE 2.5 MG: 2.5 SOLUTION RESPIRATORY (INHALATION) at 16:54

## 2022-09-03 RX ADMIN — MIDODRINE HYDROCHLORIDE 2.5 MG: 2.5 TABLET ORAL at 09:36

## 2022-09-03 RX ADMIN — METOPROLOL TARTRATE 12.5 MG: 25 TABLET, FILM COATED ORAL at 21:36

## 2022-09-03 RX ADMIN — MIDODRINE HYDROCHLORIDE 2.5 MG: 2.5 TABLET ORAL at 12:58

## 2022-09-03 RX ADMIN — SODIUM CHLORIDE, PRESERVATIVE FREE 10 ML: 5 INJECTION INTRAVENOUS at 09:38

## 2022-09-03 RX ADMIN — LEVOFLOXACIN 500 MG: 500 TABLET, FILM COATED ORAL at 09:35

## 2022-09-03 RX ADMIN — SERTRALINE HYDROCHLORIDE 50 MG: 50 TABLET ORAL at 09:34

## 2022-09-03 RX ADMIN — CETIRIZINE HYDROCHLORIDE 10 MG: 10 TABLET, FILM COATED ORAL at 09:35

## 2022-09-03 ASSESSMENT — PAIN SCALES - GENERAL
PAINLEVEL_OUTOF10: 6
PAINLEVEL_OUTOF10: 8

## 2022-09-03 ASSESSMENT — PAIN SCALES - WONG BAKER: WONGBAKER_NUMERICALRESPONSE: 0

## 2022-09-03 ASSESSMENT — PAIN DESCRIPTION - LOCATION: LOCATION: GENERALIZED
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
## 2022-09-03 NOTE — FLOWSHEET NOTE
09/03/22 1845   Safe Environment   Safety Measures   (virtual safety rounds complete)   Virtual nurse rounds, pt did not respond to audio, so camera turned on to visually check safety of patient. pt sleeping, resp unlabored, call light within reach.

## 2022-09-03 NOTE — PLAN OF CARE
Problem: Respiratory - Adult  Goal: Achieves optimal ventilation and oxygenation  Outcome: Progressing  Flowsheets (Taken 9/3/2022 1542)  Achieves optimal ventilation and oxygenation:   Assess for changes in respiratory status   Assess for changes in mentation and behavior   Oxygen supplementation based on oxygen saturation or arterial blood gases   Encourage broncho-pulmonary hygiene including cough, deep breathe, incentive spirometry     Problem: Cardiovascular - Adult  Goal: Maintains optimal cardiac output and hemodynamic stability  Outcome: Progressing  Flowsheets (Taken 9/3/2022 1542)  Maintains optimal cardiac output and hemodynamic stability:   Monitor blood pressure and heart rate   Monitor urine output and notify Licensed Independent Practitioner for values outside of normal range   Administer fluid and/or volume expanders as ordered     Problem: Skin/Tissue Integrity - Adult  Goal: Incisions, wounds, or drain sites healing without S/S of infection  Outcome: Progressing  Flowsheets (Taken 9/3/2022 1542)  Incisions, Wounds, or Drain Sites Healing Without Sign and Symptoms of Infection:   TWICE DAILY: Assess and document skin integrity   Implement wound care per orders     Problem: Gastrointestinal - Adult  Goal: Maintains adequate nutritional intake  Outcome: Progressing  Flowsheets (Taken 9/3/2022 1542)  Maintains adequate nutritional intake:   Assist with meals as needed   Monitor intake and output, weight and lab values   Care plan reviewed with patient and verbalizes understanding of the plan of care and contribute to goal setting.

## 2022-09-03 NOTE — PLAN OF CARE
Problem: Discharge Planning  Goal: Discharge to home or other facility with appropriate resources  9/3/2022 0122 by Ghanshyam Grant RN  Outcome: Progressing  Flowsheets (Taken 9/3/2022 0122)  Discharge to home or other facility with appropriate resources:   Identify barriers to discharge with patient and caregiver   Arrange for needed discharge resources and transportation as appropriate   Identify discharge learning needs (meds, wound care, etc)   Refer to discharge planning if patient needs post-hospital services based on physician order or complex needs related to functional status, cognitive ability or social support system     Problem: ABCDS Injury Assessment  Goal: Absence of physical injury  9/3/2022 0122 by Ghanshyam Grant RN  Outcome: Progressing  Flowsheets (Taken 9/3/2022 0122)  Absence of Physical Injury: Implement safety measures based on patient assessment     Problem: Skin/Tissue Integrity  Goal: Absence of new skin breakdown  Description: 1. Monitor for areas of redness and/or skin breakdown  2. Assess vascular access sites hourly  3. Every 4-6 hours minimum:  Change oxygen saturation probe site  4. Every 4-6 hours:  If on nasal continuous positive airway pressure, respiratory therapy assess nares and determine need for appliance change or resting period.   9/3/2022 0122 by Ghanshyam Grant RN  Outcome: Progressing  Note: No new skin breakdown, patient turned every 2 hours     Problem: Safety - Adult  Goal: Free from fall injury  9/3/2022 0122 by Ghanshyam Grant RN  Outcome: Progressing  Flowsheets (Taken 9/3/2022 0122)  Free From Fall Injury: Instruct family/caregiver on patient safety     Problem: Nutrition Deficit:  Goal: Optimize nutritional status  9/3/2022 0122 by Ghanshyam Grant RN  Outcome: Progressing  Flowsheets (Taken 9/3/2022 0122)  Nutrient intake appropriate for improving, restoring, or maintaining nutritional needs:   Assess nutritional status and recommend course of action Monitor oral intake, labs, and treatment plans   Provide specific nutrition education to patient or family as appropriate     Problem: Pain  Goal: Verbalizes/displays adequate comfort level or baseline comfort level  9/3/2022 0122 by Magali Horan RN  Outcome: Progressing  Flowsheets (Taken 9/3/2022 0122)  Verbalizes/displays adequate comfort level or baseline comfort level:   Encourage patient to monitor pain and request assistance   Assess pain using appropriate pain scale   Administer analgesics based on type and severity of pain and evaluate response   Implement non-pharmacological measures as appropriate and evaluate response   Consider cultural and social influences on pain and pain management   Notify Licensed Independent Practitioner if interventions unsuccessful or patient reports new pain   Care plan reviewed with patient. Patient verbalized understanding of the plan of care and contribute to goal setting.

## 2022-09-03 NOTE — PROGRESS NOTES
Nurse requested visit for emotional support. Patient is in bed. She is tired; offered to return later.
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
## 2022-09-04 LAB
BLOOD CULTURE, ROUTINE: NORMAL
BLOOD CULTURE, ROUTINE: NORMAL
POTASSIUM SERPL-SCNC: 3.5 MEQ/L (ref 3.5–5.2)

## 2022-09-04 PROCEDURE — 2060000000 HC ICU INTERMEDIATE R&B

## 2022-09-04 PROCEDURE — 6360000002 HC RX W HCPCS: Performed by: FAMILY MEDICINE

## 2022-09-04 PROCEDURE — 94640 AIRWAY INHALATION TREATMENT: CPT

## 2022-09-04 PROCEDURE — 2580000003 HC RX 258: Performed by: FAMILY MEDICINE

## 2022-09-04 PROCEDURE — 6360000002 HC RX W HCPCS: Performed by: EMERGENCY MEDICINE

## 2022-09-04 PROCEDURE — 6370000000 HC RX 637 (ALT 250 FOR IP): Performed by: EMERGENCY MEDICINE

## 2022-09-04 PROCEDURE — 6370000000 HC RX 637 (ALT 250 FOR IP): Performed by: FAMILY MEDICINE

## 2022-09-04 PROCEDURE — 84132 ASSAY OF SERUM POTASSIUM: CPT

## 2022-09-04 PROCEDURE — 36415 COLL VENOUS BLD VENIPUNCTURE: CPT

## 2022-09-04 RX ADMIN — LEVETIRACETAM 500 MG: 500 TABLET, FILM COATED ORAL at 08:52

## 2022-09-04 RX ADMIN — CEFEPIME 2000 MG: 2 INJECTION, POWDER, FOR SOLUTION INTRAVENOUS at 06:00

## 2022-09-04 RX ADMIN — POLYVINYL ALCOHOL 2 DROP: 14 SOLUTION/ DROPS OPHTHALMIC at 08:53

## 2022-09-04 RX ADMIN — ALBUTEROL SULFATE 2.5 MG: 2.5 SOLUTION RESPIRATORY (INHALATION) at 13:49

## 2022-09-04 RX ADMIN — AMIODARONE HYDROCHLORIDE 200 MG: 200 TABLET ORAL at 08:52

## 2022-09-04 RX ADMIN — PANTOPRAZOLE SODIUM 40 MG: 40 TABLET, DELAYED RELEASE ORAL at 06:00

## 2022-09-04 RX ADMIN — SODIUM CHLORIDE: 9 INJECTION, SOLUTION INTRAVENOUS at 05:58

## 2022-09-04 RX ADMIN — ACETAMINOPHEN 650 MG: 325 TABLET ORAL at 15:12

## 2022-09-04 RX ADMIN — METOPROLOL TARTRATE 12.5 MG: 25 TABLET, FILM COATED ORAL at 08:52

## 2022-09-04 RX ADMIN — ALBUTEROL SULFATE 2.5 MG: 2.5 SOLUTION RESPIRATORY (INHALATION) at 17:04

## 2022-09-04 RX ADMIN — ALBUTEROL SULFATE 2.5 MG: 2.5 SOLUTION RESPIRATORY (INHALATION) at 20:34

## 2022-09-04 RX ADMIN — ISOSORBIDE MONONITRATE 30 MG: 30 TABLET, EXTENDED RELEASE ORAL at 08:52

## 2022-09-04 RX ADMIN — LEVOTHYROXINE SODIUM 100 MCG: 0.1 TABLET ORAL at 06:00

## 2022-09-04 RX ADMIN — MIDODRINE HYDROCHLORIDE 2.5 MG: 2.5 TABLET ORAL at 12:07

## 2022-09-04 RX ADMIN — SERTRALINE HYDROCHLORIDE 50 MG: 50 TABLET ORAL at 21:28

## 2022-09-04 RX ADMIN — SODIUM CHLORIDE: 9 INJECTION, SOLUTION INTRAVENOUS at 18:13

## 2022-09-04 RX ADMIN — CETIRIZINE HYDROCHLORIDE 10 MG: 10 TABLET, FILM COATED ORAL at 08:53

## 2022-09-04 RX ADMIN — POLYVINYL ALCOHOL 2 DROP: 14 SOLUTION/ DROPS OPHTHALMIC at 12:07

## 2022-09-04 RX ADMIN — MIDODRINE HYDROCHLORIDE 2.5 MG: 2.5 TABLET ORAL at 17:01

## 2022-09-04 RX ADMIN — SERTRALINE HYDROCHLORIDE 50 MG: 50 TABLET ORAL at 08:52

## 2022-09-04 RX ADMIN — POLYVINYL ALCOHOL 2 DROP: 14 SOLUTION/ DROPS OPHTHALMIC at 21:28

## 2022-09-04 RX ADMIN — CEFEPIME 2000 MG: 2 INJECTION, POWDER, FOR SOLUTION INTRAVENOUS at 17:01

## 2022-09-04 RX ADMIN — ALBUTEROL SULFATE 2.5 MG: 2.5 SOLUTION RESPIRATORY (INHALATION) at 09:21

## 2022-09-04 RX ADMIN — SODIUM CHLORIDE, PRESERVATIVE FREE 10 ML: 5 INJECTION INTRAVENOUS at 21:28

## 2022-09-04 RX ADMIN — LEVOFLOXACIN 500 MG: 500 TABLET, FILM COATED ORAL at 08:53

## 2022-09-04 RX ADMIN — LEVETIRACETAM 500 MG: 500 TABLET, FILM COATED ORAL at 21:28

## 2022-09-04 RX ADMIN — MIDODRINE HYDROCHLORIDE 2.5 MG: 2.5 TABLET ORAL at 08:52

## 2022-09-04 RX ADMIN — ENOXAPARIN SODIUM 70 MG: 100 INJECTION SUBCUTANEOUS at 04:40

## 2022-09-04 RX ADMIN — ENOXAPARIN SODIUM 70 MG: 100 INJECTION SUBCUTANEOUS at 17:01

## 2022-09-04 ASSESSMENT — PAIN SCALES - WONG BAKER
WONGBAKER_NUMERICALRESPONSE: 0

## 2022-09-04 ASSESSMENT — PAIN DESCRIPTION - LOCATION: LOCATION: HEAD

## 2022-09-04 ASSESSMENT — PAIN DESCRIPTION - ORIENTATION: ORIENTATION: MID

## 2022-09-04 ASSESSMENT — PAIN SCALES - GENERAL
PAINLEVEL_OUTOF10: 1
PAINLEVEL_OUTOF10: 0
PAINLEVEL_OUTOF10: 3

## 2022-09-04 ASSESSMENT — PAIN DESCRIPTION - DESCRIPTORS: DESCRIPTORS: ACHING

## 2022-09-04 NOTE — PLAN OF CARE
Problem: Skin/Tissue Integrity - Adult  Goal: Incisions, wounds, or drain sites healing without S/S of infection  Outcome: Progressing  Flowsheets (Taken 9/4/2022 0800)  Incisions, Wounds, or Drain Sites Healing Without Sign and Symptoms of Infection: TWICE DAILY: Assess and document skin integrity
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
## 2022-09-04 NOTE — PROGRESS NOTES
Attempted to call POA of record Sangeeta Adjutant, [phone]  , VM box continues to be full, unable to leave message at this time

## 2022-09-04 NOTE — FLOWSHEET NOTE
09/04/22 1053   Safe Environment   Safety Measures   (Virtual RN rounds complete)         Pt did not respond to audio, camera turned on in the interest of pt safety. Pt resting comfortably, no needs identified.

## 2022-09-04 NOTE — PROGRESS NOTES
Virtual Nurse called Jayshree Andujar    [phone]  to attempt to complete medication list but voicemail box was full and unable to leave message.

## 2022-09-04 NOTE — PLAN OF CARE
Problem: Discharge Planning  Goal: Discharge to home or other facility with appropriate resources  Outcome: Progressing  Flowsheets (Taken 9/3/2022 2217)  Discharge to home or other facility with appropriate resources:   Identify barriers to discharge with patient and caregiver   Arrange for needed discharge resources and transportation as appropriate   Identify discharge learning needs (meds, wound care, etc)   Refer to discharge planning if patient needs post-hospital services based on physician order or complex needs related to functional status, cognitive ability or social support system     Problem: Respiratory - Adult  Goal: Achieves optimal ventilation and oxygenation  9/3/2022 2217 by Gene Salinas RN  Outcome: Progressing  Flowsheets (Taken 9/3/2022 2217)  Achieves optimal ventilation and oxygenation:   Assess for changes in respiratory status   Assess for changes in mentation and behavior   Position to facilitate oxygenation and minimize respiratory effort   Oxygen supplementation based on oxygen saturation or arterial blood gases   Encourage broncho-pulmonary hygiene including cough, deep breathe, incentive spirometry   Respiratory therapy support as indicated     Problem: ABCDS Injury Assessment  Goal: Absence of physical injury  Outcome: Progressing  Flowsheets  Taken 9/3/2022 2217  Absence of Physical Injury: Implement safety measures based on patient assessment  Taken 9/3/2022 2214  Absence of Physical Injury: Implement safety measures based on patient assessment     Problem: Skin/Tissue Integrity  Goal: Absence of new skin breakdown  Description: 1. Monitor for areas of redness and/or skin breakdown  2. Assess vascular access sites hourly  3. Every 4-6 hours minimum:  Change oxygen saturation probe site  4. Every 4-6 hours:  If on nasal continuous positive airway pressure, respiratory therapy assess nares and determine need for appliance change or resting period.   Outcome: Progressing  Note: No new skin break down, patient turned every two hours.      Problem: Safety - Adult  Goal: Free from fall injury  Outcome: Progressing  Flowsheets (Taken 9/3/2022 2217)  Free From Fall Injury: Instruct family/caregiver on patient safety     Problem: Nutrition Deficit:  Goal: Optimize nutritional status  Outcome: Progressing  Flowsheets (Taken 9/3/2022 2217)  Nutrient intake appropriate for improving, restoring, or maintaining nutritional needs:   Assess nutritional status and recommend course of action   Monitor oral intake, labs, and treatment plans   Order, calculate, and assess calorie counts as needed   Provide specific nutrition education to patient or family as appropriate     Problem: Pain  Goal: Verbalizes/displays adequate comfort level or baseline comfort level  Outcome: Progressing  Flowsheets (Taken 9/3/2022 2217)  Verbalizes/displays adequate comfort level or baseline comfort level:   Encourage patient to monitor pain and request assistance   Assess pain using appropriate pain scale   Administer analgesics based on type and severity of pain and evaluate response   Implement non-pharmacological measures as appropriate and evaluate response   Consider cultural and social influences on pain and pain management   Notify Licensed Independent Practitioner if interventions unsuccessful or patient reports new pain     Problem: Cardiovascular - Adult  Goal: Maintains optimal cardiac output and hemodynamic stability  9/3/2022 2217 by Wanda Edwards RN  Outcome: Progressing  Flowsheets (Taken 9/3/2022 2217)  Maintains optimal cardiac output and hemodynamic stability:   Monitor blood pressure and heart rate   Monitor urine output and notify Licensed Independent Practitioner for values outside of normal range   Assess for signs of decreased cardiac output     Problem: Skin/Tissue Integrity - Adult  Goal: Incisions, wounds, or drain sites healing without S/S of infection  9/3/2022 2217 by Wanda Edwards RN  Outcome: Progressing  Flowsheets (Taken 9/3/2022 2217)  Incisions, Wounds, or Drain Sites Healing Without Sign and Symptoms of Infection: TWICE DAILY: Assess and document skin integrity     Problem: Gastrointestinal - Adult  Goal: Maintains adequate nutritional intake  9/3/2022 2217 by Saundra Price RN  Outcome: Progressing  Flowsheets (Taken 9/3/2022 2217)  Maintains adequate nutritional intake:   Monitor percentage of each meal consumed   Identify factors contributing to decreased intake, treat as appropriate   Monitor intake and output, weight and lab values   Assist with meals as needed     Problem: Metabolic/Fluid and Electrolytes - Adult  Goal: Electrolytes maintained within normal limits  Outcome: Progressing  Flowsheets (Taken 9/3/2022 2217)  Electrolytes maintained within normal limits:   Monitor labs and assess patient for signs and symptoms of electrolyte imbalances   Administer electrolyte replacement as ordered   Monitor response to electrolyte replacements, including repeat lab results as appropriate   Fluid restriction as ordered   Instruct patient on fluid and nutrition restrictions as appropriate   Care plan reviewed with patient. Patient verbalized understanding of the plan of care and contribute to goal setting.

## 2022-09-04 NOTE — PLAN OF CARE
Problem: Respiratory - Adult  Goal: Achieves optimal ventilation and oxygenation  9/3/2022 2306 by Cristóbal Montes De Oca RCP  Outcome: Progressing     Problem: Respiratory - Adult  Goal: Clear lung sounds  Outcome: Progressing   Mutually agreed upon goals.

## 2022-09-05 LAB
ANION GAP SERPL CALCULATED.3IONS-SCNC: 10 MEQ/L (ref 8–16)
BUN BLDV-MCNC: 8 MG/DL (ref 7–22)
CALCIUM SERPL-MCNC: 7.9 MG/DL (ref 8.5–10.5)
CHLORIDE BLD-SCNC: 109 MEQ/L (ref 98–111)
CO2: 22 MEQ/L (ref 23–33)
CREAT SERPL-MCNC: 0.4 MG/DL (ref 0.4–1.2)
GFR SERPL CREATININE-BSD FRML MDRD: > 90 ML/MIN/1.73M2
GLUCOSE BLD-MCNC: 98 MG/DL (ref 70–108)
POTASSIUM SERPL-SCNC: 2.9 MEQ/L (ref 3.5–5.2)
SODIUM BLD-SCNC: 141 MEQ/L (ref 135–145)

## 2022-09-05 PROCEDURE — 80048 BASIC METABOLIC PNL TOTAL CA: CPT

## 2022-09-05 PROCEDURE — 94760 N-INVAS EAR/PLS OXIMETRY 1: CPT

## 2022-09-05 PROCEDURE — 2580000003 HC RX 258: Performed by: FAMILY MEDICINE

## 2022-09-05 PROCEDURE — 6370000000 HC RX 637 (ALT 250 FOR IP): Performed by: EMERGENCY MEDICINE

## 2022-09-05 PROCEDURE — 6360000002 HC RX W HCPCS: Performed by: EMERGENCY MEDICINE

## 2022-09-05 PROCEDURE — 94640 AIRWAY INHALATION TREATMENT: CPT

## 2022-09-05 PROCEDURE — 6370000000 HC RX 637 (ALT 250 FOR IP): Performed by: FAMILY MEDICINE

## 2022-09-05 PROCEDURE — 6360000002 HC RX W HCPCS: Performed by: FAMILY MEDICINE

## 2022-09-05 PROCEDURE — 2060000000 HC ICU INTERMEDIATE R&B

## 2022-09-05 PROCEDURE — 36415 COLL VENOUS BLD VENIPUNCTURE: CPT

## 2022-09-05 PROCEDURE — 2700000000 HC OXYGEN THERAPY PER DAY

## 2022-09-05 RX ADMIN — ENOXAPARIN SODIUM 70 MG: 100 INJECTION SUBCUTANEOUS at 04:52

## 2022-09-05 RX ADMIN — MIDODRINE HYDROCHLORIDE 2.5 MG: 2.5 TABLET ORAL at 13:33

## 2022-09-05 RX ADMIN — HYDROCODONE BITARTRATE AND ACETAMINOPHEN 1 TABLET: 5; 325 TABLET ORAL at 07:53

## 2022-09-05 RX ADMIN — ISOSORBIDE MONONITRATE 30 MG: 30 TABLET, EXTENDED RELEASE ORAL at 09:15

## 2022-09-05 RX ADMIN — SERTRALINE HYDROCHLORIDE 50 MG: 50 TABLET ORAL at 21:31

## 2022-09-05 RX ADMIN — ALBUTEROL SULFATE 2.5 MG: 2.5 SOLUTION RESPIRATORY (INHALATION) at 08:43

## 2022-09-05 RX ADMIN — LEVETIRACETAM 500 MG: 500 TABLET, FILM COATED ORAL at 21:31

## 2022-09-05 RX ADMIN — ALBUTEROL SULFATE 2.5 MG: 2.5 SOLUTION RESPIRATORY (INHALATION) at 12:17

## 2022-09-05 RX ADMIN — AMIODARONE HYDROCHLORIDE 200 MG: 200 TABLET ORAL at 09:13

## 2022-09-05 RX ADMIN — LEVOTHYROXINE SODIUM 100 MCG: 0.1 TABLET ORAL at 07:53

## 2022-09-05 RX ADMIN — POLYVINYL ALCOHOL 2 DROP: 14 SOLUTION/ DROPS OPHTHALMIC at 21:31

## 2022-09-05 RX ADMIN — POTASSIUM CHLORIDE 10 MEQ: 7.46 INJECTION, SOLUTION INTRAVENOUS at 19:25

## 2022-09-05 RX ADMIN — LEVOFLOXACIN 500 MG: 500 TABLET, FILM COATED ORAL at 09:13

## 2022-09-05 RX ADMIN — SODIUM CHLORIDE, PRESERVATIVE FREE 10 ML: 5 INJECTION INTRAVENOUS at 21:31

## 2022-09-05 RX ADMIN — MIDODRINE HYDROCHLORIDE 2.5 MG: 2.5 TABLET ORAL at 17:29

## 2022-09-05 RX ADMIN — PANTOPRAZOLE SODIUM 40 MG: 40 TABLET, DELAYED RELEASE ORAL at 09:17

## 2022-09-05 RX ADMIN — POLYVINYL ALCOHOL 2 DROP: 14 SOLUTION/ DROPS OPHTHALMIC at 09:16

## 2022-09-05 RX ADMIN — POTASSIUM CHLORIDE 10 MEQ: 7.46 INJECTION, SOLUTION INTRAVENOUS at 21:39

## 2022-09-05 RX ADMIN — SODIUM CHLORIDE: 9 INJECTION, SOLUTION INTRAVENOUS at 09:14

## 2022-09-05 RX ADMIN — CETIRIZINE HYDROCHLORIDE 10 MG: 10 TABLET, FILM COATED ORAL at 09:13

## 2022-09-05 RX ADMIN — HYDROCODONE BITARTRATE AND ACETAMINOPHEN 1 TABLET: 5; 325 TABLET ORAL at 22:00

## 2022-09-05 RX ADMIN — ALBUTEROL SULFATE 2.5 MG: 2.5 SOLUTION RESPIRATORY (INHALATION) at 15:49

## 2022-09-05 RX ADMIN — CEFEPIME 2000 MG: 2 INJECTION, POWDER, FOR SOLUTION INTRAVENOUS at 06:27

## 2022-09-05 RX ADMIN — METOPROLOL TARTRATE 12.5 MG: 25 TABLET, FILM COATED ORAL at 21:31

## 2022-09-05 RX ADMIN — POLYVINYL ALCOHOL 2 DROP: 14 SOLUTION/ DROPS OPHTHALMIC at 13:34

## 2022-09-05 RX ADMIN — LEVETIRACETAM 500 MG: 500 TABLET, FILM COATED ORAL at 09:13

## 2022-09-05 RX ADMIN — SERTRALINE HYDROCHLORIDE 50 MG: 50 TABLET ORAL at 09:13

## 2022-09-05 RX ADMIN — POTASSIUM CHLORIDE 10 MEQ: 7.46 INJECTION, SOLUTION INTRAVENOUS at 23:57

## 2022-09-05 RX ADMIN — ALBUTEROL SULFATE 2.5 MG: 2.5 SOLUTION RESPIRATORY (INHALATION) at 20:58

## 2022-09-05 RX ADMIN — SODIUM CHLORIDE, PRESERVATIVE FREE 10 ML: 5 INJECTION INTRAVENOUS at 09:17

## 2022-09-05 RX ADMIN — CEFEPIME 2000 MG: 2 INJECTION, POWDER, FOR SOLUTION INTRAVENOUS at 17:30

## 2022-09-05 RX ADMIN — ENOXAPARIN SODIUM 70 MG: 100 INJECTION SUBCUTANEOUS at 17:29

## 2022-09-05 RX ADMIN — MIDODRINE HYDROCHLORIDE 2.5 MG: 2.5 TABLET ORAL at 07:59

## 2022-09-05 ASSESSMENT — PAIN SCALES - WONG BAKER
WONGBAKER_NUMERICALRESPONSE: 0

## 2022-09-05 ASSESSMENT — PAIN SCALES - GENERAL
PAINLEVEL_OUTOF10: 4
PAINLEVEL_OUTOF10: 5
PAINLEVEL_OUTOF10: 0
PAINLEVEL_OUTOF10: 6

## 2022-09-05 ASSESSMENT — PAIN DESCRIPTION - ORIENTATION: ORIENTATION: MID

## 2022-09-05 ASSESSMENT — PAIN DESCRIPTION - LOCATION
LOCATION: HEAD
LOCATION: BACK;HEAD

## 2022-09-05 ASSESSMENT — PAIN DESCRIPTION - DESCRIPTORS
DESCRIPTORS: ACHING
DESCRIPTORS: ACHING

## 2022-09-05 ASSESSMENT — PAIN - FUNCTIONAL ASSESSMENT: PAIN_FUNCTIONAL_ASSESSMENT: ACTIVITIES ARE NOT PREVENTED

## 2022-09-05 NOTE — FLOWSHEET NOTE
09/05/22 1830   Safe Environment   Safety Measures   (Virtual RN safety rounds complete)         PT did not respond to audio, camera turned on in the interest of pt safety. Resting comfortably in bed, no needs identified.

## 2022-09-05 NOTE — PLAN OF CARE
Infection: TWICE DAILY: Assess and document skin integrity     Problem: Gastrointestinal - Adult  Goal: Maintains adequate nutritional intake  Outcome: Progressing     Problem: Metabolic/Fluid and Electrolytes - Adult  Goal: Electrolytes maintained within normal limits  Outcome: Progressing  Flowsheets (Taken 9/4/2022 1930)  Electrolytes maintained within normal limits: Monitor labs and assess patient for signs and symptoms of electrolyte imbalances
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
## 2022-09-05 NOTE — PLAN OF CARE
Problem: Respiratory - Adult  Goal: Clear lung sounds  Outcome: Progressing     Problem: Respiratory - Adult  Goal: Achieves optimal ventilation and oxygenation  9/5/2022 0146 by Abbey Luo RCP  Outcome: Progressing

## 2022-09-05 NOTE — FLOWSHEET NOTE
09/05/22 1103   Safe Environment   Safety Measures   (Virtual RN rounds complete)       PT did not respond to audio, camera turned on in the interest of pt safety. Resting comfortably in bed, no needs identified.

## 2022-09-05 NOTE — PLAN OF CARE
Problem: Discharge Planning  Goal: Discharge to home or other facility with appropriate resources  Outcome: Progressing  Flowsheets (Taken 9/5/2022 0800)  Discharge to home or other facility with appropriate resources:   Identify barriers to discharge with patient and caregiver   Arrange for needed discharge resources and transportation as appropriate   Identify discharge learning needs (meds, wound care, etc)   Arrange for interpreters to assist at discharge as needed   Refer to discharge planning if patient needs post-hospital services based on physician order or complex needs related to functional status, cognitive ability or social support system     Problem: Respiratory - Adult  Goal: Clear lung sounds  9/5/2022 0847 by Anat Hartman RCP  Outcome: Progressing  9/5/2022 0146 by Raudel Vargas RCP  Outcome: Progressing  Goal: Achieves optimal ventilation and oxygenation  9/5/2022 1516 by Johnny Galvin RN  Outcome: Progressing  Flowsheets (Taken 9/5/2022 0800)  Achieves optimal ventilation and oxygenation:   Assess for changes in respiratory status   Position to facilitate oxygenation and minimize respiratory effort  9/5/2022 0146 by Raudel Vargas RCP  Outcome: Progressing     Problem: ABCDS Injury Assessment  Goal: Absence of physical injury  Outcome: Progressing     Problem: Skin/Tissue Integrity  Goal: Absence of new skin breakdown  Description: 1. Monitor for areas of redness and/or skin breakdown  2. Assess vascular access sites hourly  3. Every 4-6 hours minimum:  Change oxygen saturation probe site  4. Every 4-6 hours:  If on nasal continuous positive airway pressure, respiratory therapy assess nares and determine need for appliance change or resting period.   Outcome: Progressing     Problem: Safety - Adult  Goal: Free from fall injury  Outcome: Progressing     Problem: Nutrition Deficit:  Goal: Optimize nutritional status  Outcome: Progressing     Problem: Pain  Goal: Verbalizes/displays adequate comfort level or baseline comfort level  Outcome: Progressing     Problem: Cardiovascular - Adult  Goal: Maintains optimal cardiac output and hemodynamic stability  Outcome: Progressing  Flowsheets (Taken 9/5/2022 0800)  Maintains optimal cardiac output and hemodynamic stability:   Monitor blood pressure and heart rate   Monitor urine output and notify Licensed Independent Practitioner for values outside of normal range   Assess for signs of decreased cardiac output   Administer fluid and/or volume expanders as ordered     Problem: Skin/Tissue Integrity - Adult  Goal: Incisions, wounds, or drain sites healing without S/S of infection  Outcome: Progressing  Flowsheets (Taken 9/5/2022 0800)  Incisions, Wounds, or Drain Sites Healing Without Sign and Symptoms of Infection: ADMISSION and DAILY: Assess and document risk factors for pressure ulcer development     Problem: Gastrointestinal - Adult  Goal: Maintains adequate nutritional intake  Outcome: Progressing  Flowsheets (Taken 9/5/2022 0800)  Maintains adequate nutritional intake:   Monitor percentage of each meal consumed   Identify factors contributing to decreased intake, treat as appropriate   Assist with meals as needed   Monitor intake and output, weight and lab values   Obtain nutritional consult as needed     Problem: Metabolic/Fluid and Electrolytes - Adult  Goal: Electrolytes maintained within normal limits  Outcome: Progressing  Flowsheets (Taken 9/5/2022 0800)  Electrolytes maintained within normal limits:   Monitor labs and assess patient for signs and symptoms of electrolyte imbalances   Administer electrolyte replacement as ordered   Monitor response to electrolyte replacements, including repeat lab results as appropriate

## 2022-09-06 ENCOUNTER — CARE COORDINATION (OUTPATIENT)
Dept: CARE COORDINATION | Age: 87
End: 2022-09-06

## 2022-09-06 LAB — POTASSIUM SERPL-SCNC: 4.3 MEQ/L (ref 3.5–5.2)

## 2022-09-06 PROCEDURE — 6370000000 HC RX 637 (ALT 250 FOR IP): Performed by: EMERGENCY MEDICINE

## 2022-09-06 PROCEDURE — 97530 THERAPEUTIC ACTIVITIES: CPT

## 2022-09-06 PROCEDURE — 6360000002 HC RX W HCPCS: Performed by: EMERGENCY MEDICINE

## 2022-09-06 PROCEDURE — 2060000000 HC ICU INTERMEDIATE R&B

## 2022-09-06 PROCEDURE — 6360000002 HC RX W HCPCS: Performed by: FAMILY MEDICINE

## 2022-09-06 PROCEDURE — 84132 ASSAY OF SERUM POTASSIUM: CPT

## 2022-09-06 PROCEDURE — 94640 AIRWAY INHALATION TREATMENT: CPT

## 2022-09-06 PROCEDURE — 6370000000 HC RX 637 (ALT 250 FOR IP): Performed by: FAMILY MEDICINE

## 2022-09-06 PROCEDURE — 94761 N-INVAS EAR/PLS OXIMETRY MLT: CPT

## 2022-09-06 PROCEDURE — 2580000003 HC RX 258: Performed by: FAMILY MEDICINE

## 2022-09-06 PROCEDURE — 2700000000 HC OXYGEN THERAPY PER DAY

## 2022-09-06 PROCEDURE — 97110 THERAPEUTIC EXERCISES: CPT

## 2022-09-06 PROCEDURE — 36415 COLL VENOUS BLD VENIPUNCTURE: CPT

## 2022-09-06 RX ADMIN — AMIODARONE HYDROCHLORIDE 200 MG: 200 TABLET ORAL at 08:50

## 2022-09-06 RX ADMIN — ISOSORBIDE MONONITRATE 30 MG: 30 TABLET, EXTENDED RELEASE ORAL at 08:50

## 2022-09-06 RX ADMIN — ENOXAPARIN SODIUM 70 MG: 100 INJECTION SUBCUTANEOUS at 03:34

## 2022-09-06 RX ADMIN — SODIUM CHLORIDE, PRESERVATIVE FREE 10 ML: 5 INJECTION INTRAVENOUS at 22:11

## 2022-09-06 RX ADMIN — POLYVINYL ALCOHOL 2 DROP: 14 SOLUTION/ DROPS OPHTHALMIC at 22:11

## 2022-09-06 RX ADMIN — POLYVINYL ALCOHOL 2 DROP: 14 SOLUTION/ DROPS OPHTHALMIC at 08:54

## 2022-09-06 RX ADMIN — SODIUM CHLORIDE: 9 INJECTION, SOLUTION INTRAVENOUS at 21:01

## 2022-09-06 RX ADMIN — ALBUTEROL SULFATE 2.5 MG: 2.5 SOLUTION RESPIRATORY (INHALATION) at 19:51

## 2022-09-06 RX ADMIN — MIDODRINE HYDROCHLORIDE 2.5 MG: 2.5 TABLET ORAL at 12:31

## 2022-09-06 RX ADMIN — POTASSIUM CHLORIDE 10 MEQ: 7.46 INJECTION, SOLUTION INTRAVENOUS at 04:27

## 2022-09-06 RX ADMIN — LEVOTHYROXINE SODIUM 100 MCG: 0.1 TABLET ORAL at 06:35

## 2022-09-06 RX ADMIN — CEFEPIME 2000 MG: 2 INJECTION, POWDER, FOR SOLUTION INTRAVENOUS at 06:29

## 2022-09-06 RX ADMIN — MIDODRINE HYDROCHLORIDE 2.5 MG: 2.5 TABLET ORAL at 08:50

## 2022-09-06 RX ADMIN — SERTRALINE HYDROCHLORIDE 50 MG: 50 TABLET ORAL at 08:52

## 2022-09-06 RX ADMIN — MIDODRINE HYDROCHLORIDE 2.5 MG: 2.5 TABLET ORAL at 17:01

## 2022-09-06 RX ADMIN — CEFEPIME 2000 MG: 2 INJECTION, POWDER, FOR SOLUTION INTRAVENOUS at 17:01

## 2022-09-06 RX ADMIN — METOPROLOL TARTRATE 12.5 MG: 25 TABLET, FILM COATED ORAL at 22:09

## 2022-09-06 RX ADMIN — CETIRIZINE HYDROCHLORIDE 10 MG: 10 TABLET, FILM COATED ORAL at 08:51

## 2022-09-06 RX ADMIN — ALBUTEROL SULFATE 2.5 MG: 2.5 SOLUTION RESPIRATORY (INHALATION) at 16:05

## 2022-09-06 RX ADMIN — ALBUTEROL SULFATE 2.5 MG: 2.5 SOLUTION RESPIRATORY (INHALATION) at 11:49

## 2022-09-06 RX ADMIN — ENOXAPARIN SODIUM 70 MG: 100 INJECTION SUBCUTANEOUS at 17:01

## 2022-09-06 RX ADMIN — POTASSIUM CHLORIDE 10 MEQ: 7.46 INJECTION, SOLUTION INTRAVENOUS at 02:11

## 2022-09-06 RX ADMIN — SERTRALINE HYDROCHLORIDE 50 MG: 50 TABLET ORAL at 22:11

## 2022-09-06 RX ADMIN — LEVETIRACETAM 500 MG: 500 TABLET, FILM COATED ORAL at 22:10

## 2022-09-06 RX ADMIN — LEVOFLOXACIN 500 MG: 500 TABLET, FILM COATED ORAL at 08:50

## 2022-09-06 RX ADMIN — POTASSIUM CHLORIDE 10 MEQ: 7.46 INJECTION, SOLUTION INTRAVENOUS at 03:18

## 2022-09-06 RX ADMIN — POLYVINYL ALCOHOL 2 DROP: 14 SOLUTION/ DROPS OPHTHALMIC at 13:06

## 2022-09-06 RX ADMIN — ALBUTEROL SULFATE 2.5 MG: 2.5 SOLUTION RESPIRATORY (INHALATION) at 07:38

## 2022-09-06 RX ADMIN — PANTOPRAZOLE SODIUM 40 MG: 40 TABLET, DELAYED RELEASE ORAL at 06:33

## 2022-09-06 RX ADMIN — METOPROLOL TARTRATE 12.5 MG: 25 TABLET, FILM COATED ORAL at 08:51

## 2022-09-06 RX ADMIN — LEVETIRACETAM 500 MG: 500 TABLET, FILM COATED ORAL at 08:50

## 2022-09-06 RX ADMIN — SODIUM CHLORIDE: 9 INJECTION, SOLUTION INTRAVENOUS at 06:28

## 2022-09-06 ASSESSMENT — PAIN SCALES - GENERAL
PAINLEVEL_OUTOF10: 0
PAINLEVEL_OUTOF10: 0

## 2022-09-06 NOTE — PLAN OF CARE
Problem: Respiratory - Adult  Goal: Clear lung sounds  9/6/2022 0758 by Chacha Grewal RCP  Outcome: Progressing     Problem: Respiratory - Adult  Goal: Achieves optimal ventilation and oxygenation  9/6/2022 0758 by Chacha Grewal RCP  Outcome: Progressing   2 lpm nc - sats 91%  Breath sounds clear

## 2022-09-06 NOTE — PLAN OF CARE
Problem: Discharge Planning  Goal: Discharge to home or other facility with appropriate resources  Outcome: Progressing  Flowsheets (Taken 9/5/2022 2045)  Discharge to home or other facility with appropriate resources:   Identify barriers to discharge with patient and caregiver   Arrange for needed discharge resources and transportation as appropriate   Identify discharge learning needs (meds, wound care, etc)   Refer to discharge planning if patient needs post-hospital services based on physician order or complex needs related to functional status, cognitive ability or social support system     Problem: Respiratory - Adult  Goal: Clear lung sounds  9/6/2022 0105 by Duane Black RCP  Outcome: Progressing  Goal: Achieves optimal ventilation and oxygenation  9/6/2022 0529 by Amy Grant RN  Outcome: Progressing  Flowsheets (Taken 9/5/2022 2045)  Achieves optimal ventilation and oxygenation:   Assess for changes in respiratory status   Assess for changes in mentation and behavior   Position to facilitate oxygenation and minimize respiratory effort   Oxygen supplementation based on oxygen saturation or arterial blood gases   Assess the need for suctioning and aspirate as needed   Assess and instruct to report shortness of breath or any respiratory difficulty   Respiratory therapy support as indicated  9/6/2022 0105 by Duane Black RCP  Outcome: Progressing  Flowsheets (Taken 9/5/2022 2045 by Amy Grant RN)  Achieves optimal ventilation and oxygenation:   Assess for changes in respiratory status   Assess for changes in mentation and behavior   Position to facilitate oxygenation and minimize respiratory effort   Oxygen supplementation based on oxygen saturation or arterial blood gases   Assess the need for suctioning and aspirate as needed   Assess and instruct to report shortness of breath or any respiratory difficulty   Respiratory therapy support as indicated     Problem: ABCDS Injury Assessment  Goal: Absence of physical injury  Outcome: Progressing  Flowsheets (Taken 9/3/2022 2217 by Evelyne Crawley RN)  Absence of Physical Injury: Implement safety measures based on patient assessment     Problem: Skin/Tissue Integrity  Goal: Absence of new skin breakdown  Description: 1. Monitor for areas of redness and/or skin breakdown  2. Assess vascular access sites hourly  3. Every 4-6 hours minimum:  Change oxygen saturation probe site  4. Every 4-6 hours:  If on nasal continuous positive airway pressure, respiratory therapy assess nares and determine need for appliance change or resting period.   Outcome: Progressing  Note: No new evidence of skin breakdown     Problem: Safety - Adult  Goal: Free from fall injury  Outcome: Progressing  Flowsheets (Taken 9/3/2022 2217 by Evelyne Crawley RN)  Free From Fall Injury: Instruct family/caregiver on patient safety     Problem: Nutrition Deficit:  Goal: Optimize nutritional status  Outcome: Progressing  Flowsheets (Taken 9/3/2022 2217 by Evelyne Crawley RN)  Nutrient intake appropriate for improving, restoring, or maintaining nutritional needs:   Assess nutritional status and recommend course of action   Monitor oral intake, labs, and treatment plans   Order, calculate, and assess calorie counts as needed   Provide specific nutrition education to patient or family as appropriate     Problem: Pain  Goal: Verbalizes/displays adequate comfort level or baseline comfort level  Outcome: Progressing  Flowsheets (Taken 9/4/2022 1930 by Ryan Nichole RN)  Verbalizes/displays adequate comfort level or baseline comfort level: Encourage patient to monitor pain and request assistance     Problem: Cardiovascular - Adult  Goal: Maintains optimal cardiac output and hemodynamic stability  Outcome: Progressing  Flowsheets (Taken 9/5/2022 2045)  Maintains optimal cardiac output and hemodynamic stability:   Monitor blood pressure and heart rate   Monitor urine output and notify Licensed Independent Practitioner for values outside of normal range   Assess for signs of decreased cardiac output   Administer fluid and/or volume expanders as ordered     Problem: Skin/Tissue Integrity - Adult  Goal: Incisions, wounds, or drain sites healing without S/S of infection  Outcome: Progressing  Flowsheets (Taken 9/5/2022 2045)  Incisions, Wounds, or Drain Sites Healing Without Sign and Symptoms of Infection:   ADMISSION and DAILY: Assess and document risk factors for pressure ulcer development   TWICE DAILY: Assess and document skin integrity   Initiate pressure ulcer prevention bundle as indicated     Problem: Gastrointestinal - Adult  Goal: Maintains adequate nutritional intake  Outcome: Progressing  Flowsheets (Taken 9/5/2022 0800 by Vinciio Barlow RN)  Maintains adequate nutritional intake:   Monitor percentage of each meal consumed   Identify factors contributing to decreased intake, treat as appropriate   Assist with meals as needed   Monitor intake and output, weight and lab values   Obtain nutritional consult as needed     Problem: Metabolic/Fluid and Electrolytes - Adult  Goal: Electrolytes maintained within normal limits  Outcome: Progressing  Flowsheets (Taken 9/5/2022 2045)  Electrolytes maintained within normal limits:   Monitor labs and assess patient for signs and symptoms of electrolyte imbalances   Administer electrolyte replacement as ordered   Monitor response to electrolyte replacements, including repeat lab results as appropriate   Fluid restriction as ordered  Care plan reviewed with patient. Patient verbalizes understanding of the care plan and contributed to goal setting.

## 2022-09-06 NOTE — PROGRESS NOTES
Family conferences with Latanya's brother, Merrill Wilson a niese, Aisha her neise, a grandson, Nurse manager Pratima Martines and me at 1:pm - 1:45 PM    Discuss about patient's status, stage 4 breast cancer ,chemotherapy ,brain surgery. Unlikely she will have recovery from the cancer, neurosurgery Dr. Madeleine Lopez do not want to operate again her brain, discuss about pulmonary emboli and blood clots of her legs bilateral including blood thinners. discussed also hospice, palliative care,      Discuss about nursing home with physical therapy and occupational therapy, patient does not like hospice but at this time she is amenable to go to a nursing home short stay for physical therapy.     Brothers, nieces, grandson and agreed with ECF for physical therapy    Spend 50 minutes counseling the family members including the patient        Jesus Ellis MD

## 2022-09-06 NOTE — CARE COORDINATION
9/6/22, 12:00 PM EDT    DISCHARGE ON [address] [place] [address] day: 7  Location: [facility]25/025-A Reason for admit: Pneumonia [J18.9]   Procedure:    8/30: CXR:  1. Right upper lobe pneumonia. 2. Likely small left pleural effusion. 3. Cardiomegaly, stable. 8/30: CT head:  1. No acute intracranial findings. No intracranial hemorrhage. 2. Persistent areas of vasogenic edema along the right parietal temporal   regions with underlying cystic changes. Persistent local mass-effect upon   the posterior and temporal horns of the right lateral ventricle. Decreased   associated right to left midline shift measuring 3 mm  9/1: Bilateral LE doppler:  1. There is extensive acute DVT extending from the left popliteal vein to the common femoral vein. 2. There is also acute DVT in the right common femoral vein and profunda. 9/1: CTA chest:  1. Right upper lobe pulmonary embolism extending from the main pulmonary   artery into the segmental and subsegmental pulmonary arteries. There is   distal groundglass opacities suggesting pulmonary infarct. No evidence of   right heart strain. 2. Multifocal groundglass opacities suggestive of a multifocal pneumonia. 3. Moderate right and small left pleural effusion. 4. Small hiatal hernia      Barriers to Discharge: K+ 2.9, nasal cannula oxygen at 3 liters with saturations at 92%. PT/OT, Pulmonology following, IV fluids, Albuterol nebs, IV Maxipime, Lovenox, Keppra, Levaquin po, Midodrine, Protonix, electrolyte replacement protocols. PCP: Ivonne Strickland MD  Readmission Risk Score: 33.5%  Patient Goals/Plan/Treatment Preferences: Return home with niece and Continued care RN and aides. May need SNF/hospice depending. Updated POA paperwork filed-Manuela RODRIGUEZ.

## 2022-09-06 NOTE — CARE COORDINATION
9/6/22, 9:11 AM EDT  DISCHARGE PLANNING EVALUATION    SW received 2 voicemail from Temitope Sanchez asking for a call back. SW spoke with patient and patient agreed to have SW call Temitope Sanchez. SW explained that Temitope Sanchez was not on the contact list since she changed her healthcare POA last week. Latanya agreeable to have Temitope Sanchez added to contact list.     RN notified SW that attending wants to meet with patient and family at [address] [place] [address] called Temitope Sanchez. Temitope Sanchez asked why every time patient came to the hospital her POA was changed. ALEX explained that hospital only addresses Health care POA and it is a frequently asked question by multiple people during admission. SW explained importance of healthcare POA if patient was not able to make her own decisions. SW explained that hospital does not address any estate affairs or financial POA. SW explained that if [place] is changed it is due to patient requesting it. SW explained that healthcare POA can be changed on patient's request at any time. SW explained that when an updated healthcare POA form is completed the previous paperwork would not be valid. SW notified Temitope Sanchez that attending wants to meet with patient and family at 1pm today. Temitope Sanchez reported that she will be there. SW called Aisha, current healthcare POA and notified her of attending wanting to meet with patient and family at [facility] let her know that Temitope Sanchez is aware of meeting as well. 9/6/22, 2:04 PM EDT  DISCHARGE PLANNING EVALUATION    RN manager updated ALEX on meeting with attending. Both Aisha and Temitope Sanchez are agreeable to [employer]. ALEX spoke with patient, Temitope Sanchez, brother Louann Landau and grandson Candy Villanueva about discharge planning. All agreeable to [facility] at discharge for rehab. Patient requested to change her POA again. RN made aware to consult spiritual care. SW made referral to [facility].          9/6/22, 2:21 PM EDT  DISCHARGE PLANNING EVALUATION    SW called [place] with [employer]. SW notified him of hospital admission. Kemal Phoenix reported that he has spoken to Angelic Ro last admission and Angelic Ro had a planned trip that she had to leave patient. Kemal Phoenix reported that it was his understanding that a passport caregiver had planned to assist while Angelic Ro was away and that did not happen. SW to keep him updated.
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
## 2022-09-06 NOTE — PLAN OF CARE
Problem: Respiratory - Adult  Goal: Clear lung sounds  Outcome: Progressing     Problem: Respiratory - Adult  Goal: Achieves optimal ventilation and oxygenation  9/6/2022 0105 by Jessy Patel RCP    Patient agrees on goals

## 2022-09-06 NOTE — PROGRESS NOTES
[facility] PHYSICAL THERAPY  DAILY NOTE  [facility] ICU STEPDOWN TELEMETRY 4K - 4K-25/025-A      Time In: 0802  Time Out: 5951  Timed Code Treatment Minutes: 43 Minutes  Minutes: 42          Date: 2022  Patient Name: Lamin Alatorre,  Gender:  female        MRN: [id number]  : 1933  (80 y.o.)     Referring Practitioner: Helen Fuentes MD  Diagnosis: Pneumonia  Additional Pertinent Hx: Admitted due to dizziness, hypotension lethargic while being checked at opthalmology office [facility]my [facility]. Her eyes was dilated. She just finished radiation on her brain yesterday. 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 Had brain surgery x 3 but brain tumors recurs. Latest MRI 22 showed brain mets bigger. Her Niece Kali Hartman who is living with her at home noted she had been confused x 3 days , had cough from time to time but no fever. Prior Level of Function:  Lives With:  (niece)  Type of Home: House  Home Layout: One level  Home Access: Level entry  Home Equipment: Lupis Rota, 4 wheeled, Oxygen        ADL Assistance: Needs assistance  Ambulation Assistance: Independent  Transfer Assistance: Independent  Additional Comments: Per PT eval: Pt states ambulates with rollator at home, 2 L O2 continuously, niece assists with ADL's. Nurse reports  that niece is not with Pt 24 hr a day.     Restrictions/Precautions:  Restrictions/Precautions: Fall Risk  Position Activity Restriction  Other position/activity restrictions: 3L O2 on ; monitor BP-  PE and left blood clot pt on lovenox       SUBJECTIVE: nursing ok'd therapy- pt in bed on arrival and agreeable for therapy - pt needed total bed change and completed multiple rolls - noted pt kept head rotated to the right throughout session- at end of session did set her up for breakfast and used the clock method (however her head was rotated to the right of her tray) for her to locate where her drink, fork and plate was located and she wasn't able to feed herself and nursing was going to assist with this task     PAIN: 0/10: pt denied pain however she did moan with graded lifts but denied pain     Vitals: Blood Pressure: 108/68  Oxygen: pt on 3L O2 sats 89-94%     OBJECTIVE:  Bed Mobility:  Rolling to Left: Moderate Assistance, to max assist with hand over hand to reach for rail    Rolling to Right: Moderate Assistance, to max assist again needing hand over hand for rail    Supine to Sit: Maximum Assistance, with HOB elevated   Sit to Supine: Maximum Assistance, then was dependent to get repositioned in bed    Scooting: Moderate Assistance  Pt had to complete several rolls to complete change of sheets and slow to respond   Transfers:  Not Tested  Did attempt some graded lifts and side scoot to [facility] pt not clearing buttocks from bed and needed max assist - she does demonstrate slow processing and difficult to follow multi step directions   Balance:  Pt sat edge of bed for ~ 15 min her balance varied from CGA to mod assist- she tended to keep her trunk and head rotated to the right needing cues for midline     Exercise:  Patient was guided in 1 set(s) 5-10 reps of exercise to both lower extremities. Ankle pumps, Heelslides, Seated marches, Long arc quads, and she needed tactile cues to complete exercises and encouragement to complete all the reps- decreased range in left LE along with decreased strength  . Exercises were completed for increased independence with functional mobility. Functional Outcome Measures: Completed  AM-PAC Inpatient Mobility without Stair Climbing Raw Score : 7  AM-PAC Inpatient without Stair Climbing T-Scale Score : 28.66    ASSESSMENT:  Assessment:  Pt cont to demonstrate generalized weakness and required much assist to complete bed mobility and unable to complete any sit to stand transfers at this time. Pt also demonstrated slow processing and difficulty following multiple step directions.  Pt would benefit from cont skilled therapy   Activity Tolerance:  Patient tolerance of  treatment: fair. Equipment Recommendations: Other: will continue to assess pending progress- pt reported that she does have a hospital bed and wheelchair at home   Discharge Recommendations: [address] and if home going  pt would require 24 hour hands on physical assist and cont therapy   Plan: Current Treatment Recommendations: Strengthening, Balance training, Functional mobility training, Endurance training, Safety education & training, Therapeutic activities, Patient/Caregiver education & training  Plan:  (5x GM)    Patient Education  Patient Education: Plan of Care, Bed Mobility, midline awareness     Goals:  Patient goals : to get better  Short Term Goals  Time Frame for Short term goals: by discharge  Short term goal 1: Pt to transfer supine <--> sit min A to enable pt to get in/out of bed. Short term goal 2: Pt to sit EOB x 10 minutes with SBA for improved upright tolerance in prep for transfers/ambulation. Short term goal 3: PT to assess gait. Long Term Goals  Time Frame for Long term goals : NA due to short length of stay. Following session, patient left in safe position with all fall risk precautions in place.

## 2022-09-06 NOTE — FLOWSHEET NOTE
09/06/22 1402   Safe Environment   Safety Measures Other (comment)  (virtual safety round complete)   Virtual Nurse rounds, staff responded to audio that they were with the patient, camera not turned on

## 2022-09-06 NOTE — FLOWSHEET NOTE
09/06/22 1030   Safe Environment   Safety Measures Other (comment)  (virtual safety round complete)   Virtual nurse rounds, pt did not respond to audio, so camera turned on to visually check safety of patient.  pt sleeping, resp unlabored

## 2022-09-06 NOTE — PROGRESS NOTES
[facility] ICU STEPDOWN TELEMETRY 4K  Occupational Therapy  Daily Note  Time:   Time In:   Time Out: 1409  Timed Code Treatment Minutes: 14 Minutes  Minutes: 14          Date: 2022  Patient Name: Veronique Evans,   Gender: female      Room: [facility]/025-A  MRN: [id number]  : 1933  (80 y.o.)  Referring Practitioner: Tamika Caro MD  Diagnosis: Pneumonia  Additional Pertinent Hx: Per ER note on 2022:89 y.o. female who presents to the emergency department from the eye doctor via EMS for evaluation of hypotension and lethargy. Family member states that she was at the eye doctor today to have the pressure tested in her right eye. When they dilated her eyes she became dizzy. They checked her blood pressure and it was low. The member states that she recently had radiation therapy for her metastatic breast cancer. They were told to expect her to be very tired and lethargic secondary to the radiation. Had brain surgery x 3 but brain tumors recurs. Latest MRI 22 showed brain mets bigger. Her Niece Liberty Gee who is living with her at home noted she had been confused x 3 days , had cough from time to time but no fever. Restrictions/Precautions:  Restrictions/Precautions: Fall Risk  Position Activity Restriction  Other position/activity restrictions: 3L O2 on ; monitor BP-  PE and left blood clot pt on lovenox     SUBJECTIVE: Pt laying in bed upon arrival, pt extremely fatigued from PT session this AM, family present during session     PAIN: 0/10:     Vitals: Nurse checked vitals prior to session    COGNITION: Slow Processing, Decreased Recall, Decreased Insight, and Impaired Memory    ADL:   No ADL's completed this session. .    BED MOBILITY:  Rolling to Left: Maximum Assistance, with head of bed raised, with head of bed flat, with verbal cues , with increased time for completion    Rolling to Right: Maximum Assistance, with head of bed flat, without rail, with verbal

## 2022-09-06 NOTE — PLAN OF CARE
Problem: Respiratory - Adult  Goal: Clear lung sounds  9/6/2022 1956 by Bhargavi Buchanan RCP  Outcome: Progressing     Problem: Respiratory - Adult  Goal: Achieves optimal ventilation and oxygenation  9/6/2022 1956 by Bhargavi Buchanan RCP  Outcome: Progressing     Continue breathing tx's to improve aeration of lungs. Wean oxygen as able per protocol. Patient mutually agreed on goals.

## 2022-09-07 ENCOUNTER — APPOINTMENT (OUTPATIENT)
Dept: GENERAL RADIOLOGY | Age: 87
DRG: 177 | End: 2022-09-07
Payer: MEDICARE

## 2022-09-07 PROBLEM — H54.8 LEGALLY BLIND: Status: ACTIVE | Noted: 2022-01-01

## 2022-09-07 PROBLEM — E43 SEVERE MALNUTRITION (HCC): Status: ACTIVE | Noted: 2022-01-01

## 2022-09-07 LAB
ANION GAP SERPL CALCULATED.3IONS-SCNC: 12 MEQ/L (ref 8–16)
BUN BLDV-MCNC: 9 MG/DL (ref 7–22)
CALCIUM SERPL-MCNC: 7.9 MG/DL (ref 8.5–10.5)
CHLORIDE BLD-SCNC: 107 MEQ/L (ref 98–111)
CO2: 19 MEQ/L (ref 23–33)
CREAT SERPL-MCNC: 0.4 MG/DL (ref 0.4–1.2)
ERYTHROCYTE [DISTWIDTH] IN BLOOD BY AUTOMATED COUNT: 16.7 % (ref 11.5–14.5)
ERYTHROCYTE [DISTWIDTH] IN BLOOD BY AUTOMATED COUNT: 57.4 FL (ref 35–45)
GFR SERPL CREATININE-BSD FRML MDRD: > 90 ML/MIN/1.73M2
GLUCOSE BLD-MCNC: 56 MG/DL (ref 70–108)
GLUCOSE BLD-MCNC: 59 MG/DL (ref 70–108)
GLUCOSE BLD-MCNC: 66 MG/DL (ref 70–108)
GLUCOSE BLD-MCNC: 83 MG/DL (ref 70–108)
HCT VFR BLD CALC: 32 % (ref 37–47)
HEMOGLOBIN: 9.4 GM/DL (ref 12–16)
MCH RBC QN AUTO: 27.8 PG (ref 26–33)
MCHC RBC AUTO-ENTMCNC: 29.4 GM/DL (ref 32.2–35.5)
MCV RBC AUTO: 94.7 FL (ref 81–99)
PLATELET # BLD: 247 THOU/MM3 (ref 130–400)
PMV BLD AUTO: 10.1 FL (ref 9.4–12.4)
POTASSIUM SERPL-SCNC: 3.6 MEQ/L (ref 3.5–5.2)
RBC # BLD: 3.38 MILL/MM3 (ref 4.2–5.4)
SODIUM BLD-SCNC: 138 MEQ/L (ref 135–145)
WBC # BLD: 8.1 THOU/MM3 (ref 4.8–10.8)

## 2022-09-07 PROCEDURE — 36415 COLL VENOUS BLD VENIPUNCTURE: CPT

## 2022-09-07 PROCEDURE — 85027 COMPLETE CBC AUTOMATED: CPT

## 2022-09-07 PROCEDURE — 6360000002 HC RX W HCPCS: Performed by: FAMILY MEDICINE

## 2022-09-07 PROCEDURE — 71046 X-RAY EXAM CHEST 2 VIEWS: CPT

## 2022-09-07 PROCEDURE — 6370000000 HC RX 637 (ALT 250 FOR IP): Performed by: EMERGENCY MEDICINE

## 2022-09-07 PROCEDURE — 97530 THERAPEUTIC ACTIVITIES: CPT

## 2022-09-07 PROCEDURE — 2580000003 HC RX 258: Performed by: FAMILY MEDICINE

## 2022-09-07 PROCEDURE — 6360000002 HC RX W HCPCS: Performed by: EMERGENCY MEDICINE

## 2022-09-07 PROCEDURE — 82948 REAGENT STRIP/BLOOD GLUCOSE: CPT

## 2022-09-07 PROCEDURE — 80048 BASIC METABOLIC PNL TOTAL CA: CPT

## 2022-09-07 PROCEDURE — 94640 AIRWAY INHALATION TREATMENT: CPT

## 2022-09-07 PROCEDURE — 97110 THERAPEUTIC EXERCISES: CPT

## 2022-09-07 PROCEDURE — 6370000000 HC RX 637 (ALT 250 FOR IP): Performed by: FAMILY MEDICINE

## 2022-09-07 PROCEDURE — 2060000000 HC ICU INTERMEDIATE R&B

## 2022-09-07 RX ORDER — FUROSEMIDE 10 MG/ML
20 INJECTION INTRAMUSCULAR; INTRAVENOUS ONCE
Status: COMPLETED | OUTPATIENT
Start: 2022-09-07 | End: 2022-09-07

## 2022-09-07 RX ORDER — IPRATROPIUM BROMIDE AND ALBUTEROL SULFATE 2.5; .5 MG/3ML; MG/3ML
1 SOLUTION RESPIRATORY (INHALATION)
Status: DISCONTINUED | OUTPATIENT
Start: 2022-09-07 | End: 2022-09-08 | Stop reason: HOSPADM

## 2022-09-07 RX ORDER — PREDNISONE 1 MG/1
1 TABLET ORAL DAILY
Status: DISCONTINUED | OUTPATIENT
Start: 2022-09-07 | End: 2022-09-08 | Stop reason: HOSPADM

## 2022-09-07 RX ADMIN — ISOSORBIDE MONONITRATE 30 MG: 30 TABLET, EXTENDED RELEASE ORAL at 08:41

## 2022-09-07 RX ADMIN — PANTOPRAZOLE SODIUM 40 MG: 40 TABLET, DELAYED RELEASE ORAL at 06:51

## 2022-09-07 RX ADMIN — POLYVINYL ALCOHOL 2 DROP: 14 SOLUTION/ DROPS OPHTHALMIC at 14:29

## 2022-09-07 RX ADMIN — ALBUTEROL SULFATE 2.5 MG: 2.5 SOLUTION RESPIRATORY (INHALATION) at 09:30

## 2022-09-07 RX ADMIN — AMIODARONE HYDROCHLORIDE 200 MG: 200 TABLET ORAL at 08:38

## 2022-09-07 RX ADMIN — POLYVINYL ALCOHOL 2 DROP: 14 SOLUTION/ DROPS OPHTHALMIC at 08:39

## 2022-09-07 RX ADMIN — METOPROLOL TARTRATE 12.5 MG: 25 TABLET, FILM COATED ORAL at 08:38

## 2022-09-07 RX ADMIN — MIDODRINE HYDROCHLORIDE 2.5 MG: 2.5 TABLET ORAL at 16:11

## 2022-09-07 RX ADMIN — PREDNISONE 1 MG: 1 TABLET ORAL at 12:19

## 2022-09-07 RX ADMIN — FUROSEMIDE 20 MG: 10 INJECTION, SOLUTION INTRAMUSCULAR; INTRAVENOUS at 18:53

## 2022-09-07 RX ADMIN — SERTRALINE HYDROCHLORIDE 50 MG: 50 TABLET ORAL at 08:38

## 2022-09-07 RX ADMIN — POLYVINYL ALCOHOL 2 DROP: 14 SOLUTION/ DROPS OPHTHALMIC at 21:37

## 2022-09-07 RX ADMIN — LEVOTHYROXINE SODIUM 100 MCG: 0.1 TABLET ORAL at 06:53

## 2022-09-07 RX ADMIN — ENOXAPARIN SODIUM 70 MG: 100 INJECTION SUBCUTANEOUS at 16:11

## 2022-09-07 RX ADMIN — ALBUTEROL SULFATE 2.5 MG: 2.5 SOLUTION RESPIRATORY (INHALATION) at 17:02

## 2022-09-07 RX ADMIN — SODIUM CHLORIDE, PRESERVATIVE FREE 10 ML: 5 INJECTION INTRAVENOUS at 21:37

## 2022-09-07 RX ADMIN — SODIUM CHLORIDE: 9 INJECTION, SOLUTION INTRAVENOUS at 23:37

## 2022-09-07 RX ADMIN — MIDODRINE HYDROCHLORIDE 2.5 MG: 2.5 TABLET ORAL at 12:19

## 2022-09-07 RX ADMIN — MIDODRINE HYDROCHLORIDE 2.5 MG: 2.5 TABLET ORAL at 08:37

## 2022-09-07 RX ADMIN — CETIRIZINE HYDROCHLORIDE 10 MG: 10 TABLET, FILM COATED ORAL at 08:38

## 2022-09-07 RX ADMIN — HYDROCODONE BITARTRATE AND ACETAMINOPHEN 1 TABLET: 5; 325 TABLET ORAL at 18:05

## 2022-09-07 RX ADMIN — LEVETIRACETAM 500 MG: 500 TABLET, FILM COATED ORAL at 08:37

## 2022-09-07 RX ADMIN — ALBUTEROL SULFATE 2.5 MG: 2.5 SOLUTION RESPIRATORY (INHALATION) at 12:58

## 2022-09-07 RX ADMIN — SODIUM CHLORIDE: 9 INJECTION, SOLUTION INTRAVENOUS at 10:16

## 2022-09-07 RX ADMIN — ENOXAPARIN SODIUM 70 MG: 100 INJECTION SUBCUTANEOUS at 04:31

## 2022-09-07 RX ADMIN — LEVOFLOXACIN 500 MG: 500 TABLET, FILM COATED ORAL at 08:37

## 2022-09-07 ASSESSMENT — PAIN SCALES - GENERAL
PAINLEVEL_OUTOF10: 8
PAINLEVEL_OUTOF10: 7
PAINLEVEL_OUTOF10: 6
PAINLEVEL_OUTOF10: 0

## 2022-09-07 ASSESSMENT — PAIN SCALES - WONG BAKER
WONGBAKER_NUMERICALRESPONSE: 2
WONGBAKER_NUMERICALRESPONSE: 2

## 2022-09-07 ASSESSMENT — PAIN DESCRIPTION - LOCATION: LOCATION: GENERALIZED

## 2022-09-07 ASSESSMENT — PAIN DESCRIPTION - DESCRIPTORS: DESCRIPTORS: ACHING

## 2022-09-07 NOTE — PROGRESS NOTES
09/07/22 0825   Encounter Summary   Encounter Overview/Reason  Attempted Encounter;Spiritual/Emotional Needs   Service Provided For: Patient   Referral/Consult From: [place]; Other    Support System Family members   Last Encounter  09/07/22  (Pt is blind. Attempted ACP)   Complexity of Encounter Low   Begin Time 0820   End Time  0825   Total Time Calculated 5 min   Encounter    Type Initial Screen/Assessment   Spiritual/Emotional needs   Type Spiritual Support   Advance Care Planning   Type ACP conversation   Assessment/Intervention/Outcome   Assessment Unable to assess   Intervention Prayer (assurance of)/Waldo;Sustaining Presence/Ministry of presence   Outcome Did not respond   The  attempted to have a follow up encounter with the 80 yr old Palliative Care patient to discuss APC/ Advance Care Planning but the pt was fatigued, tired and was not physically able to complete the Advance Directive or attentive enough to have a meaningful dialogue. The pt is blind and was admitted due to pneumonia. The pt does have family and lives with her Niece Ar Hernandez. According to the pt's chart (prior to admission) the Niece stated that \"the pt was was confused for 3 days. \" I offered prayer at the pt's bedside. PLAN:  I or another  will follow up with the patient at another time when she is more cognizant and alert.

## 2022-09-07 NOTE — PROGRESS NOTES
09/07/22 1044   Encounter Summary   Encounter Overview/Reason  Spiritual/Emotional Needs   Service Provided For: Patient   Referral/Consult From: Nurse   Support System Family members   Last Encounter  09/07/22  (Follow up ACP encounter)   Complexity of Encounter Moderate   Begin Time 1034   End Time  1044   Total Time Calculated 10 min   Encounter    Type Follow up   Spiritual/Emotional needs   Type Spiritual Support   Advance Care Planning   Type ACP conversation   Advance Care Planning     Advance Care Planning Inpatient Note  Spiritual Care Department    Today's Date: 9/7/2022  Unit: [facility] ICU STEPDOWN TELEMETRY 4K    Received request from IDT Member. Upon review of chart and communication with care team, Spiritual Care will defer advance care planning with patient at this time. . Patient was/were present in the room during visit. Goals of ACP Conversation:  During my follow up encounter with the 80, yr old Palliative Care patient she was not physically able at this time to engage in meaningful dialogue. Health Care Decision Makers:       Primary Decision Maker: Maranda Learydi - Niece/Nephew - [phone]  Summary:  No Decision Maker named by patient at this time  {  [address] 53 (Patient Wishes):  None     Assessment:  During my follow up encounter with the 80 yr old Palliative Care patient she was awake and in bed. As we began to engage in conversation to discuss ACP she did not comprehend. She mumbled and did not coherently express her wishes. Interventions: The  offered words of hope and the pt joined me in prayer. Care Preferences Communicated:   No    Outcomes/Plan: At this time the patient was not cognizant of her ACP decisions. A follow up spiritual care visit would be helpful for the patient and her family.      Electronically signed by Celia Mehta, [employer] on 9/7/2022 at 10:46 AM

## 2022-09-07 NOTE — PLAN OF CARE
Problem: Metabolic/Fluid and Electrolytes - Adult  Goal: Electrolytes maintained within normal limits  Outcome: Progressing  Flowsheets (Taken 9/7/2022 0840)  Electrolytes maintained within normal limits:   Monitor labs and assess patient for signs and symptoms of electrolyte imbalances   Administer electrolyte replacement as ordered   Monitor response to electrolyte replacements, including repeat lab results as appropriate     Problem: Nutrition Deficit:  Goal: Optimize nutritional status  Outcome: Progressing  Flowsheets (Taken 9/3/2022 2217 by Shashank White RN)  Nutrient intake appropriate for improving, restoring, or maintaining nutritional needs:   Assess nutritional status and recommend course of action   Monitor oral intake, labs, and treatment plans   Order, calculate, and assess calorie counts as needed   Provide specific nutrition education to patient or family as appropriate     Problem: Safety - Adult  Goal: Free from fall injury  Outcome: Progressing  Flowsheets (Taken 9/3/2022 2217 by Shashank White, RN)  Free From Fall Injury: Instruct family/caregiver on patient safety

## 2022-09-07 NOTE — PROGRESS NOTES
[facility]  Occupational Therapy  Daily Note  Time:   Time In:   Time Out: 1018  Timed Code Treatment Minutes: 31 Minutes  Minutes: 31          Date: 2022  Patient Name: Tamela Smallwood,   Gender: female      Room: [facility]25/025-A  MRN: [id number]  : 1933  (80 y.o.)  Referring Practitioner: Drew Blake MD  Diagnosis: Pneumonia  Additional Pertinent Hx: Per ER note on 2022:89 y.o. female who presents to the emergency department from the eye doctor via EMS for evaluation of hypotension and lethargy. Family member states that she was at the eye doctor today to have the pressure tested in her right eye. When they dilated her eyes she became dizzy. They checked her blood pressure and it was low. The member states that she recently had radiation therapy for her metastatic breast cancer. They were told to expect her to be very tired and lethargic secondary to the radiation. Had brain surgery x 3 but brain tumors recurs. Latest MRI 22 showed brain mets bigger. Her Niece Lucien Beckford who is living with her at home noted she had been confused x 3 days , had cough from time to time but no fever. Restrictions/Precautions:  Restrictions/Precautions: Fall Risk  Position Activity Restriction  Other position/activity restrictions: 2L O2 on ; monitor BP-  PE and left blood clot pt on lovenox, visually impaired     SUBJECTIVE: Patient supine in bed upon arrival; agreeable to therapy with moderate encouragement. Patient requires encouragement throughout session as patient attempts to return to supine upon sitting EOB. Patient fatigues quickly demonstrating decreased activity tolerance. Patient states she would like to be able to return home.     PAIN: no numerical scale provided however with movement, vocalizes pain    Vitals: Oxygen: 2L nasal canula >90% throughout    COGNITION: Decreased Insight and Decreased Problem Solving    ADL:   No ADL's completed this session. declined . BALANCE:  Sitting Balance:  Minimal Assistance, Moderate Assistance, X 1, with cues for safety. Seated EOB x ~6 min to complete UE HEP. Patient requires verbal cues for upright posture with unilateral release    BED MOBILITY:  Rolling to Left: Maximum Assistance, X 2, with head of bed flat, without rail, with verbal cues , with increased time for completion    Rolling to Right: Maximum Assistance, X 2, with head of bed flat, without rail, with verbal cues , with increased time for completion    Supine to Sit: Maximum Assistance, X 1, with head of bed raised, without rail, with verbal cues , with increased time for completion    Sit to Supine: Maximum Assistance, X 2, with head of bed flat, without rail, with verbal cues , with increased time for completion    Scooting: Dependent hercules    ADDITIONAL ACTIVITIES:  Patient completed BUE strengthening exercises with skilled education on HEP: completed x10 reps x1 set with AAROM in all joints and all planes in order to improve UE strength and activity tolerance required for BADL routine and toilet / shower transfers. Patient tolerated fair, requiring minimal rest breaks. Patient also required minimal verbal/tactile cues for technique. ASSESSMENT:     Activity Tolerance:  Patient tolerance of  treatment: fair. Discharge Recommendations: Subacute/skilled nursing facility  Equipment Recommendations:  Other: Monitor pending progress  Plan: Times per Week: 5x  Current Treatment Recommendations: Functional mobility training, Balance training, Self-Care / ADL, Strengthening, Endurance training, Patient/Caregiver education & training, Safety education & training    Patient Education  Patient Education: Role of OT, Plan of Care, Home Exercise Program, Home Safety, and Importance of Increasing Activity    Goals  Short Term Goals  Time Frame for Short term goals: until discharge  Short Term Goal 1: Pt will tolerate sitting EOB 8-10 min with 0>CGA for increased ease of EOB ADL tasks  Short Term Goal 2: Pt will complete grooming tasks with min A  Short Term Goal 3: Pt will tolerate further assessment of functional t/fs by OTR when appropriate  Long Term Goals  Time Frame for Long term goals : No LTG set d/t short ELOS    Following session, patient left in safe position with all fall risk precautions in place.

## 2022-09-07 NOTE — CARE COORDINATION
9/7/22, 8:08 AM EDT  DISCHARGE PLANNING EVALUATION    SW called [facility] and left a voicemail for Dalia Contreras. SW requested a call back. 9/7/22, 9:02 AM EDT  DISCHARGE PLANNING EVALUATION    SW received a call from Dalia Contreras at [facility] and she wanted to confirm that treatments and IV medications will be stopped before discharge. She reported that they will accept patient if IV medications and treatments are on hold or stopped. RN made aware and will check with attending.

## 2022-09-07 NOTE — PLAN OF CARE
Problem: Respiratory - Adult  Goal: Clear lung sounds  9/7/2022 0932 by Farzad Gonzalez, RCHOWARD  Outcome: Progressing     Problem: Respiratory - Adult  Goal: Achieves optimal ventilation and oxygenation  9/7/2022 0932 by Farzad Gonzalez, JERICHO  Outcome: Progressing     Patient mutually agreed on goals.
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
## 2022-09-07 NOTE — PROGRESS NOTES
09/07/22 1659   Encounter Summary   Encounter Overview/Reason  Spiritual/Emotional Needs   Service Provided For: Patient and family together   Referral/Consult From: Nurse   Support System Family members   Last Encounter  09/07/22   Complexity of Encounter High   Begin Time 1630   End Time  1640   Total Time Calculated 10 min   Encounter    Type Follow up   Spiritual/Emotional needs   Type Spiritual Support   Latanya's family wanted to do a new AD. Lavelle Maier is not awake and she is only repeating what any one is saying. This  found her not to be alert or able to speak or understand what is happening. This  talked with the family as to what is Latanya's wishes and they know that she wants. They know she has a living will and that she has made her wishes known to her doctor. She does have a DNR papers. The family included her brother, Hussein Roberson and her grandson, as well as Sorin Block. Lavelle Maier is planning on going back to the nursing home tomorrow. We had prayer for her healing and she repeated every word this  said in her prayers. Words of comfort given.

## 2022-09-07 NOTE — PROGRESS NOTES
Comprehensive Nutrition Assessment    Type and Reason for Visit:  Reassess    Nutrition Recommendations/Plan:   Pt refusing to eat, intake not meeting needs, physician to advise. RN mentions she tried to feed pt lunch but pt  refusing to eat. Send Magic cup TID as appropriate. Consider SLP swallow eval to insure swallowing safety as appropriate. Alerted RN no BM noted  LOS DAY 8. Notice glycolax is prn. Malnutrition Assessment:  Malnutrition Status:  Severe malnutrition (09/07/22 1617)    Context:  Acute Illness     Findings of the 6 clinical characteristics of malnutrition:  Energy Intake:  75% or less of estimated energy requirements for 7 or more days  Weight Loss:  Unable to assess (+ 1 & + 2edema)     Body Fat Loss: Moderate body fat loss Orbital   Muscle Mass Loss: Moderate muscle mass loss Temples (temporalis), Clavicles (pectoralis & deltoids)  Fluid Accumulation:  Mild Extremities   Strength:  Not Performed       Nutrition Assessment:     Pt. nutritionally declining AEB pt refusing to eat, po intake poor. At risk for further nutrition compromise r/t increased nutrient needs for wound healing, admit  with rt sided pneumonia likely aspiration, severe malnutrition,breast cancer with mets to liver & brain ( craniotomy x 3 for debulking of tumor, last craniotomy was 5/11/22, and underlying medical condition (hx chemotherapy, blind rt eye, just finished radiation therapy (8/29), GERD, Covid 19 (1/21/22, hypercholesterolemia, sigmoid diverticulosis, steroid induced hyperglycemia, SVT, Vitamin D Deficiency    Nutrition Related Findings:    Pt. Report/Treatments/Miscellaneous: Pt refused hospice per cht. Pt refusing to eat per RN. Pt's eyes are closed, occasionally answers my questions. GI Status:  No BM LOS day 8. Glycolax is prn. Pertinent Labs: (9/7) Ca Serum 7.9, Hemoglobin 9.4.    Pertinent Meds: Deltasone     Wound Type:  (stage II sacrum, stage II buttocks left inner, stage II buttocks rt)       Current Nutrition Intake & Therapies:    Average Meal Intake: 0%, 1-25%, 26-50%  Average Supplements Intake: None Ordered  ADULT DIET; Regular  ADULT ORAL NUTRITION SUPPLEMENT; Breakfast, Lunch, Dinner; Frozen Oral Supplement    Anthropometric Measures:  Height: 5' (152.4 cm)  Ideal Body Weight (IBW): 100 lbs (45 kg)    Admission Body Weight: 144 lb 6.4 oz (65.5 kg) ((8/31) + 1 pitting RLE & + 2 pitting LLE edema)  Current Body Weight: 148 lb 11.2 oz (67.4 kg) ((9/7) + 1 pitting RLE & + 2 LLE edema),   IBW. Current BMI (kg/m2): 29  Usual Body Weight:  (per EMR: (8/25) 134#, (8/21) 134# 14.4oz bedscale, (8/18) 130# 1.1 oz stand scale, (8/4) 135#)                       BMI Categories: Overweight (BMI 25.0-29. 9)    Estimated Daily Nutrient Needs:  Energy Requirements Based On: Kcal/kg  Weight Used for Energy Requirements:  ((9/1))  Energy (kcal/day): 1638kcals ( ~25kcals/kgm)     Protein (g/day): 63-90 grams (1.4-2 grams protein/kgm IBW)       Nutrition Diagnosis:   Moderate malnutrition, In context of acute illness or injury related to inadequate protein-energy intake as evidenced by Criteria as identified in malnutrition assessment    Nutrition Interventions:   Food and/or Nutrient Delivery: Start Oral Diet (diet per physician)  Nutrition Education/Counseling: Education not appropriate  Coordination of Nutrition Care: Continue to monitor while inpatient       Goals:     Goals: PO intake 50% or greater, by next RD assessment (as appropriate)       Nutrition Monitoring and Evaluation:   Behavioral-Environmental Outcomes: None Identified  Food/Nutrient Intake Outcomes: Diet Advancement/Tolerance, Food and Nutrient Intake, Supplement Intake, Vitamin/Mineral Intake  Physical Signs/Symptoms Outcomes: Biochemical Data, Chewing or Swallowing, GI Status, Fluid Status or Edema, Hemodynamic Status, Nutrition Focused Physical Findings, Skin, Weight    Discharge Planning:     Too soon to determine     France Simons GERMAN, BAKARI  Contact: [phone]

## 2022-09-08 VITALS
OXYGEN SATURATION: 86 % | WEIGHT: 150 LBS | RESPIRATION RATE: 18 BRPM | DIASTOLIC BLOOD PRESSURE: 35 MMHG | HEIGHT: 60 IN | BODY MASS INDEX: 29.45 KG/M2 | TEMPERATURE: 97.9 F | HEART RATE: 68 BPM | SYSTOLIC BLOOD PRESSURE: 113 MMHG

## 2022-09-08 LAB
ANION GAP SERPL CALCULATED.3IONS-SCNC: 13 MEQ/L (ref 8–16)
BUN BLDV-MCNC: 9 MG/DL (ref 7–22)
CALCIUM SERPL-MCNC: 7.9 MG/DL (ref 8.5–10.5)
CHLORIDE BLD-SCNC: 105 MEQ/L (ref 98–111)
CO2: 22 MEQ/L (ref 23–33)
CREAT SERPL-MCNC: 0.4 MG/DL (ref 0.4–1.2)
GFR SERPL CREATININE-BSD FRML MDRD: > 90 ML/MIN/1.73M2
GLUCOSE BLD-MCNC: 102 MG/DL (ref 70–108)
GLUCOSE BLD-MCNC: 111 MG/DL (ref 70–108)
GLUCOSE BLD-MCNC: 61 MG/DL (ref 70–108)
GLUCOSE BLD-MCNC: 63 MG/DL (ref 70–108)
POTASSIUM SERPL-SCNC: 3.3 MEQ/L (ref 3.5–5.2)
SARS-COV-2, NAAT: NOT DETECTED
SODIUM BLD-SCNC: 140 MEQ/L (ref 135–145)

## 2022-09-08 PROCEDURE — 94760 N-INVAS EAR/PLS OXIMETRY 1: CPT

## 2022-09-08 PROCEDURE — 6370000000 HC RX 637 (ALT 250 FOR IP): Performed by: EMERGENCY MEDICINE

## 2022-09-08 PROCEDURE — 94640 AIRWAY INHALATION TREATMENT: CPT

## 2022-09-08 PROCEDURE — 6370000000 HC RX 637 (ALT 250 FOR IP): Performed by: FAMILY MEDICINE

## 2022-09-08 PROCEDURE — 87635 SARS-COV-2 COVID-19 AMP PRB: CPT

## 2022-09-08 PROCEDURE — 82948 REAGENT STRIP/BLOOD GLUCOSE: CPT

## 2022-09-08 PROCEDURE — 80048 BASIC METABOLIC PNL TOTAL CA: CPT

## 2022-09-08 PROCEDURE — 36415 COLL VENOUS BLD VENIPUNCTURE: CPT

## 2022-09-08 PROCEDURE — 6360000002 HC RX W HCPCS: Performed by: EMERGENCY MEDICINE

## 2022-09-08 RX ORDER — POLYETHYLENE GLYCOL 3350 17 G/17G
17 POWDER, FOR SOLUTION ORAL DAILY PRN
Qty: 527 G | Refills: 1 | Status: SHIPPED | OUTPATIENT
Start: 2022-09-08 | End: 2022-10-08

## 2022-09-08 RX ORDER — IPRATROPIUM BROMIDE AND ALBUTEROL SULFATE 2.5; .5 MG/3ML; MG/3ML
3 SOLUTION RESPIRATORY (INHALATION)
Qty: 120 ML | Refills: 1 | Status: SHIPPED | OUTPATIENT
Start: 2022-09-08

## 2022-09-08 RX ORDER — ENOXAPARIN SODIUM 100 MG/ML
1 INJECTION SUBCUTANEOUS EVERY 12 HOURS
Qty: 30 EACH | Refills: 0 | Status: SHIPPED | OUTPATIENT
Start: 2022-09-08

## 2022-09-08 RX ORDER — ONDANSETRON 4 MG/1
4 TABLET, ORALLY DISINTEGRATING ORAL EVERY 8 HOURS PRN
Qty: 12 TABLET | Refills: 0 | Status: SHIPPED | OUTPATIENT
Start: 2022-09-08

## 2022-09-08 RX ORDER — LEVOFLOXACIN 500 MG/1
500 TABLET, FILM COATED ORAL DAILY
Qty: 6 TABLET | Refills: 0 | Status: SHIPPED | OUTPATIENT
Start: 2022-09-08 | End: 2022-09-14

## 2022-09-08 RX ADMIN — CETIRIZINE HYDROCHLORIDE 10 MG: 10 TABLET, FILM COATED ORAL at 10:03

## 2022-09-08 RX ADMIN — SERTRALINE HYDROCHLORIDE 50 MG: 50 TABLET ORAL at 10:05

## 2022-09-08 RX ADMIN — PANTOPRAZOLE SODIUM 40 MG: 40 TABLET, DELAYED RELEASE ORAL at 10:01

## 2022-09-08 RX ADMIN — ISOSORBIDE MONONITRATE 30 MG: 30 TABLET, EXTENDED RELEASE ORAL at 10:26

## 2022-09-08 RX ADMIN — ENOXAPARIN SODIUM 70 MG: 100 INJECTION SUBCUTANEOUS at 04:05

## 2022-09-08 RX ADMIN — MIDODRINE HYDROCHLORIDE 2.5 MG: 2.5 TABLET ORAL at 10:26

## 2022-09-08 RX ADMIN — LEVOFLOXACIN 500 MG: 500 TABLET, FILM COATED ORAL at 10:03

## 2022-09-08 RX ADMIN — POLYVINYL ALCOHOL 2 DROP: 14 SOLUTION/ DROPS OPHTHALMIC at 10:08

## 2022-09-08 RX ADMIN — IPRATROPIUM BROMIDE AND ALBUTEROL SULFATE 1 AMPULE: .5; 3 SOLUTION RESPIRATORY (INHALATION) at 09:53

## 2022-09-08 RX ADMIN — LEVETIRACETAM 500 MG: 500 TABLET, FILM COATED ORAL at 10:02

## 2022-09-08 RX ADMIN — METOPROLOL TARTRATE 12.5 MG: 25 TABLET, FILM COATED ORAL at 10:22

## 2022-09-08 RX ADMIN — AMIODARONE HYDROCHLORIDE 200 MG: 200 TABLET ORAL at 10:26

## 2022-09-08 RX ADMIN — LEVOTHYROXINE SODIUM 100 MCG: 0.1 TABLET ORAL at 10:04

## 2022-09-08 RX ADMIN — PREDNISONE 1 MG: 1 TABLET ORAL at 10:06

## 2022-09-08 NOTE — PLAN OF CARE
Problem: Respiratory - Adult  Goal: Clear lung sounds  9/7/2022 2101 by Riley Cantrell RCP  Outcome: Progressing  9/7/2022 0932 by Emmie Ricks RCP  Outcome: Progressing  Goal: Achieves optimal ventilation and oxygenation  9/7/2022 2101 by Riley Cantrell RCP  Outcome: Progressing  9/7/2022 0958 by Ashley Moreno RN  Outcome: Progressing  9/7/2022 0932 by Emmie Ricks RCP  Outcome: Progressing

## 2022-09-08 NOTE — CARE COORDINATION
9/8/22, 12:00 PM EDT    Patient goals/plan/ treatment preferences discussed by  and . Patient goals/plan/ treatment preferences reviewed with patient/ family. Patient/ family verbalize understanding of discharge plan and are in agreement with goal/plan/treatment preferences. Understanding was demonstrated using the teach back method. AVS provided by RN at time of discharge, which includes all necessary medical information pertaining to the patients current course of illness, treatment, post-discharge goals of care, and treatment preferences. Services At/After Discharge: [facility] (SNF)       IMM Letter  IMM Letter given to Patient/Family/Significant other/Guardian/POA/by[de-identified] pt access  IMM Letter date given[de-identified] 09/30/22  IMM Letter time given[de-identified] [phone] with Dl at [facility] and they are ready to accept Latanya. Spoke with Gama Pope and she is aware and wants to make sure her family is aware. Called and spoke with Aisha and she stated that she will update the family and is aware of the 1 pm transport. Dl is aware and RN is aware and will fax paperwork when complete. Updated [employer] PAOLA Rasmussen of discharge to [employer].

## 2022-09-08 NOTE — PROGRESS NOTES
Report called to [facility]. No questions at this time.

## 2022-09-08 NOTE — PROGRESS NOTES
[address] THERAPY MISSED TREATMENT NOTE  [facility] ICU STEPDOWN TELEMETRY 4K  4K-25/025-A      Date: 2022  Patient Name: Ray Bryant        CSN: [id number]   : 1933  (80 y.o.)  Gender: female   Referring Practitioner: Jesus Ellis MD  Diagnosis: Pneumonia         REASON FOR MISSED TREATMENT:  Patient lethargic, difficult to arouse for therapy this date. Patient mumbles with communication and difficulty keeping eyes open. Discussed with nursing, collaborated with OTR to decrease frequency 3-5x a week at this time.

## 2022-09-08 NOTE — PLAN OF CARE
Problem: Discharge Planning  Goal: Discharge to home or other facility with appropriate resources  Outcome: Progressing  Flowsheets (Taken 9/7/2022 2000)  Discharge to home or other facility with appropriate resources:   Identify barriers to discharge with patient and caregiver   Arrange for needed discharge resources and transportation as appropriate     Problem: Respiratory - Adult  Goal: Clear lung sounds  9/7/2022 2101 by Irais Acuna RCP  Outcome: Progressing  Goal: Achieves optimal ventilation and oxygenation  9/8/2022 0512 by Jw Camarena RN  Outcome: Progressing  9/7/2022 2101 by Irais Acuna RCP  Outcome: Progressing  Flowsheets (Taken 9/7/2022 2000 by Jw Camarena RN)  Achieves optimal ventilation and oxygenation:   Assess for changes in respiratory status   Assess for changes in mentation and behavior   Position to facilitate oxygenation and minimize respiratory effort   Respiratory therapy support as indicated     Problem: ABCDS Injury Assessment  Goal: Absence of physical injury  Outcome: Progressing     Problem: Skin/Tissue Integrity  Goal: Absence of new skin breakdown  Description: 1. Monitor for areas of redness and/or skin breakdown  2. Assess vascular access sites hourly  3. Every 4-6 hours minimum:  Change oxygen saturation probe site  4. Every 4-6 hours:  If on nasal continuous positive airway pressure, respiratory therapy assess nares and determine need for appliance change or resting period.   Outcome: Progressing     Problem: Safety - Adult  Goal: Free from fall injury  Outcome: Progressing  Flowsheets (Taken 9/7/2022 2227)  Free From Fall Injury:   Instruct family/caregiver on patient safety   Based on caregiver fall risk screen, instruct family/caregiver to ask for assistance with transferring infant if caregiver noted to have fall risk factors     Problem: Nutrition Deficit:  Goal: Optimize nutritional status  9/8/2022 0512 by Jw Camarena RN  Outcome: Progressing  9/7/2022 1635 by Sera Asencio RD, LD  Outcome: Not Progressing  Flowsheets (Taken 9/7/2022 1635)  Nutrient intake appropriate for improving, restoring, or maintaining nutritional needs:   Assess nutritional status and recommend course of action   Monitor oral intake, labs, and treatment plans   Recommend appropriate diets, oral nutritional supplements, and vitamin/mineral supplements     Problem: Pain  Goal: Verbalizes/displays adequate comfort level or baseline comfort level  Outcome: Progressing     Problem: Cardiovascular - Adult  Goal: Maintains optimal cardiac output and hemodynamic stability  Outcome: Progressing  Flowsheets (Taken 9/7/2022 2000)  Maintains optimal cardiac output and hemodynamic stability:   Monitor blood pressure and heart rate   Monitor urine output and notify Licensed Independent Practitioner for values outside of normal range   Assess for signs of decreased cardiac output     Problem: Skin/Tissue Integrity - Adult  Goal: Incisions, wounds, or drain sites healing without S/S of infection  Outcome: Progressing  Flowsheets  Taken 9/7/2022 2228  Incisions, Wounds, or Drain Sites Healing Without Sign and Symptoms of Infection: ADMISSION and DAILY: Assess and document risk factors for pressure ulcer development  Taken 9/7/2022 2000  Incisions, Wounds, or Drain Sites Healing Without Sign and Symptoms of Infection: ADMISSION and DAILY: Assess and document risk factors for pressure ulcer development     Problem: Gastrointestinal - Adult  Goal: Maintains adequate nutritional intake  Outcome: Progressing  Flowsheets (Taken 9/7/2022 2000)  Maintains adequate nutritional intake:   Monitor percentage of each meal consumed   Identify factors contributing to decreased intake, treat as appropriate     Problem: Metabolic/Fluid and Electrolytes - Adult  Goal: Electrolytes maintained within normal limits  Outcome: Progressing  Flowsheets (Taken 9/7/2022 2000)  Electrolytes maintained within normal limits:   Monitor labs and assess patient for signs and symptoms of electrolyte imbalances   Administer electrolyte replacement as ordered     Problem: Nutrition Deficit:  Goal: Optimize nutritional status  9/8/2022 0512 by Tati Bain RN  Outcome: Progressing  9/7/2022 1635 by Kanchan Lux RD, LD  Outcome: Not Progressing  Flowsheets (Taken 9/7/2022 1635)  Nutrient intake appropriate for improving, restoring, or maintaining nutritional needs:   Assess nutritional status and recommend course of action   Monitor oral intake, labs, and treatment plans   Recommend appropriate diets, oral nutritional supplements, and vitamin/mineral supplements
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
## 2022-09-08 NOTE — PLAN OF CARE
Problem: Discharge Planning  Goal: Discharge to home or other facility with appropriate resources  9/8/2022 1340 by Kishore Goncalves RN  Outcome: Adequate for Discharge  Flowsheets (Taken 9/8/2022 0800)  Discharge to home or other facility with appropriate resources:   Identify barriers to discharge with patient and caregiver   Arrange for needed discharge resources and transportation as appropriate   Refer to discharge planning if patient needs post-hospital services based on physician order or complex needs related to functional status, cognitive ability or social support system  9/8/2022 0512 by Fadia Jeter RN  Outcome: Progressing  Flowsheets (Taken 9/7/2022 2000)  Discharge to home or other facility with appropriate resources:   Identify barriers to discharge with patient and caregiver   Arrange for needed discharge resources and transportation as appropriate     Problem: Respiratory - Adult  Goal: Achieves optimal ventilation and oxygenation  9/8/2022 1340 by Kishore Goncalves RN  Outcome: Adequate for Discharge  9/8/2022 0512 by Fadia Jeter RN  Outcome: Progressing     Problem: ABCDS Injury Assessment  Goal: Absence of physical injury  9/8/2022 1340 by Kishore Goncalves RN  Outcome: Adequate for Discharge  9/8/2022 0512 by Fadia Jeter RN  Outcome: Progressing     Problem: Skin/Tissue Integrity  Goal: Absence of new skin breakdown  Description: 1. Monitor for areas of redness and/or skin breakdown  2. Assess vascular access sites hourly  3. Every 4-6 hours minimum:  Change oxygen saturation probe site  4. Every 4-6 hours:  If on nasal continuous positive airway pressure, respiratory therapy assess nares and determine need for appliance change or resting period.   9/8/2022 1340 by Kishore Goncalves RN  Outcome: Adequate for Discharge  9/8/2022 7193 by Fadia Jeter RN  Outcome: Progressing     Problem: Safety - Adult  Goal: Free from fall injury  9/8/2022 1340 by Kishore Goncalves RN  Outcome: Adequate for Discharge  9/8/2022 8242 by Lucille Gamboa RN  Outcome: Progressing  Flowsheets (Taken 9/7/2022 2227)  Free From Fall Injury:   Instruct family/caregiver on patient safety   Based on caregiver fall risk screen, instruct family/caregiver to ask for assistance with transferring infant if caregiver noted to have fall risk factors     Problem: Nutrition Deficit:  Goal: Optimize nutritional status  9/8/2022 1340 by Osmar Muñoz RN  Outcome: Adequate for Discharge  9/8/2022 0512 by Lucille Gamboa RN  Outcome: Progressing     Problem: Pain  Goal: Verbalizes/displays adequate comfort level or baseline comfort level  9/8/2022 1340 by Osmar Muñoz RN  Outcome: Adequate for Discharge  9/8/2022 0512 by Lucille Gamboa RN  Outcome: Progressing     Problem: Cardiovascular - Adult  Goal: Maintains optimal cardiac output and hemodynamic stability  9/8/2022 1340 by Osmar Muñoz RN  Outcome: Adequate for Discharge  9/8/2022 0512 by Lucille Gamboa RN  Outcome: Progressing  Flowsheets (Taken 9/7/2022 2000)  Maintains optimal cardiac output and hemodynamic stability:   Monitor blood pressure and heart rate   Monitor urine output and notify Licensed Independent Practitioner for values outside of normal range   Assess for signs of decreased cardiac output     Problem: Skin/Tissue Integrity - Adult  Goal: Incisions, wounds, or drain sites healing without S/S of infection  9/8/2022 1340 by Osmar Muñoz RN  Outcome: Adequate for Discharge  Flowsheets (Taken 9/8/2022 0800)  Incisions, Wounds, or Drain Sites Healing Without Sign and Symptoms of Infection:   ADMISSION and DAILY: Assess and document risk factors for pressure ulcer development   TWICE DAILY: Assess and document skin integrity  9/8/2022 0512 by Lucille Gamboa RN  Outcome: Progressing  Flowsheets  Taken 9/7/2022 2228  Incisions, Wounds, or Drain Sites Healing Without Sign and Symptoms of Infection: ADMISSION and DAILY: Assess and document risk factors for pressure ulcer development  Taken 9/7/2022 2000  Incisions, Wounds, or Drain Sites Healing Without Sign and Symptoms of Infection: ADMISSION and DAILY: Assess and document risk factors for pressure ulcer development     Problem: Gastrointestinal - Adult  Goal: Maintains adequate nutritional intake  9/8/2022 1340 by Paige Sorensen RN  Outcome: Adequate for Discharge  9/8/2022 0512 by Ayesha Bundy RN  Outcome: Progressing  Flowsheets (Taken 9/7/2022 2000)  Maintains adequate nutritional intake:   Monitor percentage of each meal consumed   Identify factors contributing to decreased intake, treat as appropriate     Problem: Metabolic/Fluid and Electrolytes - Adult  Goal: Electrolytes maintained within normal limits  9/8/2022 1340 by Paige Sorensen RN  Outcome: Adequate for Discharge  Flowsheets (Taken 9/8/2022 0800)  Electrolytes maintained within normal limits:   Monitor labs and assess patient for signs and symptoms of electrolyte imbalances   Administer electrolyte replacement as ordered  9/8/2022 9882 by Ayesha Bundy RN  Outcome: Progressing  Flowsheets (Taken 9/7/2022 2000)  Electrolytes maintained within normal limits:   Monitor labs and assess patient for signs and symptoms of electrolyte imbalances   Administer electrolyte replacement as ordered     Care plan reviewed with patient and family. Patient and family verbalize understanding of the plan of care and contribute to goal setting.

## 2022-09-12 ENCOUNTER — CARE COORDINATION (OUTPATIENT)
Dept: CARE COORDINATION | Age: 87
End: 2022-09-12

## 2022-09-13 ENCOUNTER — TELEPHONE (OUTPATIENT)
Dept: FAMILY MEDICINE CLINIC | Age: 87
End: 2022-09-13

## 2022-09-13 NOTE — TELEPHONE ENCOUNTER
Anusha Prabhjot Rosesophia, ([phone] home #) - called said that pt is wanting out of the nursing home. Gutierrez Drewds that she is very upset, not eating good there. Said that she can get the same care at home that she gets at the nursing home. She is wanting to talk to Rabia Butt. [facility].
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
## 2022-09-15 NOTE — TELEPHONE ENCOUNTER
Did call and left message with daughter as can transfer home and with hospice as just need to contact the nursing home

## 2022-09-15 NOTE — PROGRESS NOTES
Physician Progress Note      Jose Carlos Chiu  CSN #:                  [id number]  :                       1933  ADMIT DATE:       2022 4:18 PM  Siri Manning DATE:        2022 1:00 PM  RESPONDING  PROVIDER #:        Linda Bingham MD          QUERY TEXT:    Patient admitted with Dizziness and hypotension. Documentation reflects   Aspiration PNA in [facility] & [facility]. If possible, please document in the progress notes   and discharge summary if Aspiration PNA was: The medical record reflects the following:  Risk Factors: Elderly  Clinical Indicators: CXR-Moderate pneumonia involving the right lung   relatively diffusely. Mild pneumonia along left hemidiaphragm and small patch   of infiltrate left mid and upper lung fields. Treatment: IV ATB's, IVF, imaging, lab monitoring    Thank You! Don Jackson RN, CRCR  RN Clinical Documentation Integrity  (T) [phone] (W) [phone]  Options provided:  -- Aspiration PNA confirmed after study  -- Aspiration PNA ruled out after study  -- Other - I will add my own diagnosis  -- Disagree - Not applicable / Not valid  -- Disagree - Clinically unable to determine / Unknown  -- Refer to Clinical Documentation Reviewer    PROVIDER RESPONSE TEXT:    Aspiration PNA confirmed after study. Query created by: Adam Perez on 2022 11:51 AM      Electronically signed by:   Linad Bingham MD 9/15/2022 4:07 PM

## 2022-09-24 NOTE — PROGRESS NOTES
Physician Progress Note      Toshia Thomas  CSN #:                  [id number]  :                       1933  ADMIT DATE:       2022 4:18 PM  [address] DATE:        2022 1:00 PM  RESPONDING  PROVIDER #:        Miley Tucker MD          QUERY TEXT:    Pt admitted with Aspiration PNA. Pt noted to have WBC of 13.7, glucose 174,   CRP 27.95, procalcitonin 0.31 and BP 86/44. 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 Cristhian Cantu RN, CRCR  RN Clinical Documentation Integrity  (B) [phone] (O) [phone]  Options provided:  -- Sepsis due to aspiration PNA, present on admission  -- Aspiration PNA without Sepsis  -- Sepsis was ruled out  -- Other - I will add my own diagnosis  -- Disagree - Not applicable / Not valid  -- Disagree - Clinically unable to determine / Unknown  -- Refer to Clinical Documentation Reviewer    PROVIDER RESPONSE TEXT:    This patient has Aspiration PNA without Sepsis. Query created by: Sherman Dinero on 2022 9:19 AM      Electronically signed by:   Miley Tucker MD 2022 8:24 AM

## 2022-10-03 NOTE — TELEPHONE ENCOUNTER
Date of last visit:  8/9/2022  Date of next visit:  11/10/2022    Requested Prescriptions     Pending Prescriptions Disp Refills    amiodarone (CORDARONE) 200 MG tablet 30 tablet 5     Sig: take 1 tablet by mouth every morning    isosorbide mononitrate (IMDUR) 30 MG extended release tablet 30 tablet 5     Sig: take 1 tablet by mouth once daily    levothyroxine (SYNTHROID) 100 MCG tablet 30 tablet 5     Sig: take 1 tablet by mouth once daily    midodrine (PROAMATINE) 2.5 MG tablet 90 tablet 5     Sig: Take 1 tablet by mouth 3 times daily     Patient is getting Discharged from [facility] and requesting the following meds to [facility] on [address]

## 2022-10-04 RX ORDER — LEVOTHYROXINE SODIUM 0.1 MG/1
TABLET ORAL
Qty: 30 TABLET | Refills: 5 | Status: SHIPPED | OUTPATIENT
Start: 2022-10-04 | End: 2022-10-27 | Stop reason: SDUPTHER

## 2022-10-04 RX ORDER — ISOSORBIDE MONONITRATE 30 MG/1
TABLET, EXTENDED RELEASE ORAL
Qty: 30 TABLET | Refills: 5 | Status: SHIPPED | OUTPATIENT
Start: 2022-10-04

## 2022-10-04 RX ORDER — AMIODARONE HYDROCHLORIDE 200 MG/1
TABLET ORAL
Qty: 30 TABLET | Refills: 5 | Status: SHIPPED | OUTPATIENT
Start: 2022-10-04

## 2022-10-04 RX ORDER — MIDODRINE HYDROCHLORIDE 2.5 MG/1
2.5 TABLET ORAL 3 TIMES DAILY
Qty: 90 TABLET | Refills: 5 | Status: SHIPPED | OUTPATIENT
Start: 2022-10-04

## 2022-10-04 NOTE — PROGRESS NOTES
occipital junction as evidence for high-grade neoplasm with stable 1 cm leftward midline shift for surgical planning. 07/04/21 MRI Brain w wo Cons    Impression       1. The patient has undergone interval removal of the large mass in the right posterior temporal lobe. 2. There is a residual 3.9 x 2.1 x 1.7 cm cavity at the surgical site with increased T1 weighted signal intensity suggestive of hemorrhage. 3. There is moderate surrounding edema. 4. There is significantly less mass effect and midline deviation than on previous study. 5. There are probable ischemic changes in the white matter with no evidence of an acute infarct. 6. There are postoperative changes in the right orbit. 7. Otherwise negative MRI scan of the brain with and without intravenous contrast.     05/09/2022 InPt Consult [facility]    INTERVAL HISTORY:  Christopher Shearer was initiated on Enhertu under the care of Dr. Nora Cantu.  She had favorable response to systemic therapy, And in October 2021 MRI scan of the brain indicated stability of the resection cavity. Christopher Shearer was brought to the emergency department 5/6/2022 with complaints of increasing headache, mood/personality changes and nausea/vomiting. MRI scan shows a large cystic enhancing mass corresponding to the previous operative bed. There is also an area of abnormal enhancement along the adjacent meninges very worrisome for meningeal involvement. Christopher Shearer has been seen for neurosurgical reconsultation (Dr. Dhara Lopez). In accordance with clinical practice guidelines she received a recommendation for repeat resection followed by radiation therapy. She is being seen today 5/9/2022 for reevaluation and discussion regarding the potential role of radiation therapy in the management of her metastatic breast cancer. 05/11/2022 Neurosurgery, Pathology    FINAL DIAGNOSIS:   A-C. Brain, right, lesion, excision:     Metastatic carcinoma consistent with breast primary.      Specimen:   A) BRAIN BIOPSY, RIGHT LESION  FS   B) BRAIN BIOPSY, RIGHT LESION   C) BRAIN BIOPSY, RIGHT LESION     05/12/2022 MRI Brain W WO Cons    Impression       1. Postoperative changes involving the right parietal and posterior temporal calvarium. 2. Thickening at the surgical site and in the right lateral ventricle. 3. 3.7 x 1.3 cm area of abnormal signal intensity in the right posterior temporal lobe consistent with postoperative changes and hemorrhage at the surgical site. 4. There is edema in the right temporal and parietal regions. 4. There is mild atrophy and dilatation of the lateral ventricles. 5.. Probable ischemic changes in the white matter. 6. There is a small extra-axial lesion over the left posterior frontal lobe possibly representing an incidental meningioma. 06/03/2022 MRI Brain W WO Cons    Impression       1. Postoperative changes in the right posterior temporal/parietal region. 2. Large cavitary lesion at the surgical site measuring 4.3 x 3.5 x 2.8 cm in size significantly larger than on previous study dated 12/8/2022. Extra-axial fluid collection at the surgical site measuring 4.9 x 0.8 cm in size. 3. Abnormal enhancement along the anterior aspect of the cavity and extensive surrounding edema suggestive of tumor. There is mass effect upon the right lateral ventricle. 4. Mild atrophy and dilatation of the third and lateral ventricles. 5. Increased signal intensity in the white matter which may represent ischemic changes or possibly changes secondary to radiation therapy. 6. Small incidental meningioma over the left frontal lobe laterally. 7. Inflammatory changes in the mastoid air cells bilaterally. Cloteal Ortez        06/06/2022 As per Neurosurgery follow up    Assessment/Plan:  Status Post craniotomy and reexploration for resection of recurrent intercranial lesion performed by Dr. Rolf Rapp, without complication  Doing well overall  Encouraged gradual increase in physical and mental activity. Fall precaution and home safety education provided to patient. Follow-up: 2-week follow-up to ascertain continued progress towards healing of the incision and for more detailed review of the latest MRI imaging following input from the neurosurgeon. A follow-up appointment with oncology is processed along with a appointment to wound care for evaluation of the incision to rule out any dehiscence or complications    90/39/3659 As per wound care,  \" Lamin Alatorre is a 80 y.o. female who presents today for evaluation of nonhealing surgical wound to head. Patient is s/p re-exploration craniotomy and resection of recurrent brain lesion per Dr. Padmini Valente 5/11/22. At follow up visit 6/6 with PA for suture removal surgical wound dehiscence was noted to proximal portion of wound prompting referral to clinic today. Patient has been leaving area open to air, states that she has aid assist with shower weekly, has been washing with shampoo. She notes difficulties with her short term memory post operatively and headaches. She is current with home health-weekly visits per nursing and daily aide. States she lives with her brother. Patient is legally blind, ambulates with walker. She denies any fevers or chills. Denies any further needs or concerns. ..  -nonhealing surgical wound, surgical wound dehiscence- Three small open areas noted along incision line. Adjacent skin very thin, fragile- may be difficult to heal.  Minimal drainage noted today. Will start use of Duoderm thin to wound, change twice weekly. Advised her to keep area clean and dry. Clean with chlorhexidine gluconate weekly. Avoid use of regular shampoos due to scents and moisturizers. Home health nursing to assist with wound care as patient is legally blind, unable to see wounds. Orders sent today.       -No indications of infection to wounds today. Education provided on signs and symptoms of infection.   Call clinic or seek emergency care should nurse that can monitor the patient and her response to restarting the midodrine. \"    07/21/2022 As per wound care,  \"Patient examined and evaluated. All available lab work, radiology studies, and progress notes pertaining to [facility] reviewed prior to or during patient visit today.     -nonhealing surgical wound, surgical wound dehiscence- One small open area remains along incision line, no drainage noted. Patient states she prefers to leave open to air. Advised her to keep area clean and dry. I do recommend she clean With chlorhexidine gluconate 1-2 times weekly to help keep clean and decrease bacterial load on scalp. Avoid use of regular shampoos due to scents and moisturizers.       -No indications of infection to wounds today. Education provided on signs and symptoms of infection. Call clinic or seek emergency care should these occur.     -Encouraged patient and her daughter to discuss new concerns for pressure in her head with surgeon. Seek emergency care with any new onset or worsening of symptoms. \"     Metastatic breast cancer ([address] (Resolved)   7/2/2021 Surgery    Craniotomy with resection of brain mass, performed by Dr. Lilliam Chaparro ([facility])    FINAL DIAGNOSIS:   A-B. Right parietal lobe, masses 1 and 2, biopsies:    Metastatic carcinoma consistent with breast primary. See microscopic description. 7/6/2021 Initial Diagnosis    Breast cancer metastasized to brain, right [facility])      Chemotherapy    Systemic therapy administered under the care of Dr. Meagan Rojo ([address])     5/11/2022 Surgery    Neurosurgery performed under the care of Dr. Lilliam Chaparro (Neurosurgery)    Clinical Information: BRAIN MASS     FINAL DIAGNOSIS:   A-C. Brain, right, lesion, excision:     Metastatic carcinoma consistent with breast primary.      Specimen:   A) BRAIN BIOPSY, RIGHT LESION  FS   B) BRAIN BIOPSY, RIGHT LESION   C) BRAIN BIOPSY, RIGHT LESION     Intraoperative Consultation:   A - Frozen Section DX: Malignancy present. SIO    Received:  10:27 Reported:  10:48     Gross Examination:   A - The container is labeled Latanya Rod, right brain lesion. Received fresh for frozen section analysis are multiple fragments of   red-white tissue aggregating to 1.7 x 1.2 x 0.3 cm. A crush prep is   made and the remaining specimen is entirely submitted for frozen   section analysis. Frozen section remnants are entirely submitted. 1   ss. B - The container is labeled Latanya Rod, right brain lesion. Received in formalin are fragments of pink-white tissue aggregating to   3 x 2.5 x 2 cm. Representative sections are submitted in three   cassettes. ss.     C - The container is labeled Latanya Rod, right brain lesion. Received in formalin are multiple fragments of red-white tissue   aggregating to 2.2 x 1.7 x 0.4 cm.  1 ns. SIO/DKR:[id number]     Microscopic Examination:   A-C. The specimens demonstrate nests of malignant cells with   associated necrosis. The malignant cells demonstrate nuclear   pleomorphism, prominent nucleoli, and moderate vacuolated cytoplasm. Mitotic figures are readily identifiable. The patient's history of   metastatic poorly differentiated breast carcinoma is noted. Immunohistochemistry for GATA3 is performed on block B2 with adequate   controls. The malignant cells are positive for GATA3. The morphology   and immunohistochemistry are consistent with metastatic breast   carcinoma. Clinical and radiologic correlation is recommended. Breast 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 ([facility] [address]   7/2/2021 Surgery    Craniotomy with resection of brain mass, performed by Dr. Washington Aj ([facility])     FINAL DIAGNOSIS:   A-B. Right parietal lobe, masses 1 and 2, biopsies:    Metastatic carcinoma consistent with breast primary. See microscopic description.       5/11/2022 Surgery    Neurosurgery performed under the care of Dr. Washington Aj (Neurosurgery)     Clinical Information: BRAIN MASS     FINAL DIAGNOSIS:   A-C. Brain, right, lesion, excision:     Metastatic carcinoma consistent with breast primary. Specimen:   A) BRAIN BIOPSY, RIGHT LESION  FS   B) BRAIN BIOPSY, RIGHT LESION   C) BRAIN BIOPSY, RIGHT LESION     Intraoperative Consultation:   A - Frozen Section DX:  Malignancy present. SIO    Received:  10:27 Reported:  10:48     Gross Examination:   A - The container is labeled Latanya Rod, right brain lesion. Received fresh for frozen section analysis are multiple fragments of   red-white tissue aggregating to 1.7 x 1.2 x 0.3 cm. A crush prep is   made and the remaining specimen is entirely submitted for frozen   section analysis. Frozen section remnants are entirely submitted. 1   ss. B - The container is labeled Latanya Sins, right brain lesion. Received in formalin are fragments of pink-white tissue aggregating to   3 x 2.5 x 2 cm. Representative sections are submitted in three   cassettes. ss.     C - The container is labeled Latanya Rod, right brain lesion. Received in formalin are multiple fragments of red-white tissue   aggregating to 2.2 x 1.7 x 0.4 cm.  1 ns. SIO/DKR:[id number]     Microscopic Examination:   A-C. The specimens demonstrate nests of malignant cells with   associated necrosis. The malignant cells demonstrate nuclear   pleomorphism, prominent nucleoli, and moderate vacuolated cytoplasm. Mitotic figures are readily identifiable. The patient's history of   metastatic poorly differentiated breast carcinoma is noted. Immunohistochemistry for GATA3 is performed on block B2 with adequate   controls. The malignant cells are positive for GATA3. The morphology   and immunohistochemistry are consistent with metastatic breast   carcinoma. Clinical and radiologic correlation is recommended.      5/24/2022 Initial Diagnosis    Breast 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 [facility])      Chemotherapy    Systemic therapy administered under the care of Dr. Brenna Van ([address])       8/16/2022 - 8/29/2022 Radiation    Treatment Course Number: 1    Treatment Site (s) Modality Dose (cGy) Fractions Elapsed Days   Whole Brain RT RT/LT 3000 10 13   Cumulative Dose: 3000 cGy    Radiation therapy delivered under the care of Dr. Kathleen Mg MD MS ([facility])         Treatment Tolerance/Response:     Fatigue:Must curtail daily activities even with rest periods and early bedtime    Pain Location: Back  Pain Intensity (Current): 10 Worst possible pain  Pain Treatment: Norco given at hospital, pt states she was unaware. Pain Relief: n/a    Emotional Alteration:   Coping: effective    Nutritional Alteration  Anorexia: none   Nausea: 1-2 episodes of nausea in 24 hours  Vomiting: No vomiting   Salivary Glands- Acute: No changes over baseline  Taste Disturbance (Dysgeusia): Normal   Dyspepsia/Heartburn: None  Dysphagia/Esophagitis: None    Skin Alteration   Skin reaction: No changes noted  Alopecia: No loss    Mucous Membrane Alteration  Mucositis XRT Related: None  Pharynx and Esophagus- Acute: No change over baseline  Voice Changes: Normal    Ventilation Alteration  Cough: None  Hemoptysis: None  Dyspnea: Normal  Mucous Quantity/Quality:     CNS Alteration  Level of Consciousness: Alert, responds briskly, appropriately with minimal stimulus  Seizure Activity: None  Insomnia: Normal   Orientation: Oriented in 3 spheres of person, place, and time  Comment:   Speech Impairment: No  Ataxia: Normal    ECOG: 3 - Symptomatic, >50% in bed, but not bedbound (Capable of only limited self-care, confined to bed or chair 50% or more of waking hours)    Latanya Rod tolerated radiation therapy well with only mild treatment related symptoms as detailed above. Radiation therapy was completed as planned without any significant treatment breaks or suspensions. Any treatment effects experienced at completion of therapy should improve or resolve in the next 2-3 weeks.  Continue symptom management, if required, as instructed on the last day of treatment. Systemic Therapy: None      Follow Up Plan: Patient tolerated radiation therapy well overall with only mild side effects noted above. Continue regular follow up with all other treating physicians. The patient will return to clinic in 1 month or sooner if clinically indicated. They have our clinic number to call with any questions or concerns if needed. Thank you for allowing us to be a part of their care.     Electronically signed by Benigno Rajput MD on 10/4/2022 at 1:01 PM  Radiation Oncology    CC:  Dr. Karthik Magallon ([address]) Dr. Leopold Baas ([facility])   [facility]: Tumor Registry: Kandy Talbert

## 2022-10-07 ENCOUNTER — CLINICAL DOCUMENTATION (OUTPATIENT)
Dept: SPIRITUAL SERVICES | Facility: CLINIC | Age: 87
End: 2022-10-07

## 2022-10-07 NOTE — PROGRESS NOTES
As a follow up to spiritual care, this  called [facility] and her niece answered the phone. Her niece informed me that Tulio Soriano is still in bed and unable to get out of bed at this time. Words of comfort and encouragement given to Tulio Soriano and to her niece. Follow up in a few weeks. Care Plan:  Continue spiritual and emotional care for patient and family. Including prayers.

## 2022-10-11 RX ORDER — LEVOFLOXACIN 500 MG/1
500 TABLET, FILM COATED ORAL DAILY
Qty: 10 TABLET | Refills: 0 | Status: SHIPPED | OUTPATIENT
Start: 2022-10-11 | End: 2022-10-21

## 2022-10-11 RX ORDER — DEXTROMETHORPHAN HBR, GUAIFENESIN, PHENYLEPHRINE HCL 10; 200; 5 MG/5ML; MG/5ML; MG/5ML
LIQUID ORAL
Qty: 118 ML | Refills: 0 | Status: SHIPPED | OUTPATIENT
Start: 2022-10-11

## 2022-10-11 NOTE — PROGRESS NOTES
Patient is currently on hospice care, been coughing with chest congestion with thick yellow phlegm, patient was prescribed Levaquin and Robitussin-CF

## 2022-10-14 ENCOUNTER — CARE COORDINATION (OUTPATIENT)
Dept: CARE COORDINATION | Age: 87
End: 2022-10-14

## 2022-10-14 NOTE — TELEPHONE ENCOUNTER
Date of last visit:  7/26/2018  Date of next visit:  9/27/2018    Requested Prescriptions     Pending Prescriptions Disp Refills    sertraline (ZOLOFT) 50 MG tablet [Pharmacy Med Name: SERTRALINE HCL 50 MG TABLET] 180 tablet 1     Sig: take 1 tablet by mouth twice a day Attending Attestation (For Attendings USE Only)...
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
## 2022-11-07 NOTE — TELEPHONE ENCOUNTER
Raghu Mayorga called about Misbah Gaona stating she has phlegm stuff and when she does cough it up it's yellow    Please call Misbah Gaona once addressed [phone]    Pt uses [address]

## 2022-11-07 NOTE — TELEPHONE ENCOUNTER
I spoke with Maddie Hobbs and the pt has not been out of the bed since she has been home from the nursing home. I asked her if she has said  something to Hospice when they come and she sad \"no\". I told her I would call Hospice and speak with them. Maddie Hobbs said that t he pt had been out of the NH for several months. I spoke with [facility] and they will be going out tomorrow to assess the patient. They will call us back and let us know what is going on. If the patient is need of an ATB they will call us. Maddie Hobbs informed.

## 2022-12-06 NOTE — TELEPHONE ENCOUNTER
Date of last visit:  3/21/2019  Date of next visit:  8/13/2019    Requested Prescriptions     Pending Prescriptions Disp Refills    levothyroxine (SYNTHROID) 88 MCG tablet [Pharmacy Med Name: LEVOTHYROXINE 88 MCG TABLET] 90 tablet 1     Sig: take 1 tablet by mouth once daily
General

## 2023-03-01 NOTE — PLAN OF CARE
Patient came in today and stated that his Pharmacy doesn't have [address] in stock and he needs something else sent to [facility]. Patient is completely out of his insulin Problem: Skin/Tissue Integrity  Goal: Absence of new skin breakdown  Description: 1. Monitor for areas of redness and/or skin breakdown  2. Assess vascular access sites hourly  3. Every 4-6 hours minimum:  Change oxygen saturation probe site  4. Every 4-6 hours:  If on nasal continuous positive airway pressure, respiratory therapy assess nares and determine need for appliance change or resting period. Outcome: Progressing  Note: No skin breakdown this shift. Patient being assisted with turning. Patients states understanding of repositioning every two hours. Problem: ABCDS Injury Assessment  Goal: Absence of physical injury  Outcome: Progressing  Flowsheets (Taken 8/23/2022 0258)  Absence of Physical Injury: Implement safety measures based on patient assessment     Problem: Safety - Adult  Goal: Free from fall injury  Outcome: Progressing  Flowsheets (Taken 8/23/2022 0258)  Free From Fall Injury: Instruct family/caregiver on patient safety  Note: Fall assessment completed. Patient using call light appropriately to call for assistance with ambulation to bathroom. Personal items within reach. Patient is also compliant with use of non-skid slippers. Problem: Pain  Goal: Verbalizes/displays adequate comfort level or baseline comfort level  Outcome: Progressing  Flowsheets (Taken 8/21/2022 2332)  Verbalizes/displays adequate comfort level or baseline comfort level:   Encourage patient to monitor pain and request assistance   Assess pain using appropriate pain scale   Administer analgesics based on type and severity of pain and evaluate response   Implement non-pharmacological measures as appropriate and evaluate response  Note: No pain stated this shift.      Problem: Discharge Planning  Goal: Discharge to home or other facility with appropriate resources  Outcome: Progressing  Flowsheets (Taken 8/21/2022 2332)  Discharge to home or other facility with appropriate resources:   Identify barriers to

## 2023-06-01 NOTE — TELEPHONE ENCOUNTER
Aaron Beaulieu informed.
Please call [facility], needs to elevate legs, use compression stockings during the day.
electronic

## 2023-06-28 NOTE — PROGRESS NOTES
Attending Note:     Patient seen and examined in the floor in conjunction with neurosurgery PA/NP.  Discussed with patient and her nurse as well.  All data and imaging reviewed by myself. I agree with examination assessment and plan as documented below.  Shlomo Patel MD    Neurosurgery Progress Note    Patient:  Paralee Frame      Unit/Bed:4A-04/004-A    YOB: 1933    MRN: [id number]     Acct: [de-identified]     Admit date: 5/6/2022    Chief Complaint   Patient presents with    Abdominal Pain       Patient Seen, Chart, Physician notes, Labs, Radiology studies reviewed. Subjective: Seen and evaluated on the floor with evaluation exam findings reviewed discussed with Dr. Yohana Thomas and with nursing. Patient was resting comfortably today with pain moderately controlled. Past, Family, Social History unchanged from admission. Diet:  ADULT ORAL NUTRITION SUPPLEMENT; Breakfast, Lunch, Dinner; Frozen Oral Supplement  ADULT DIET; Regular;  No Caffeine    Medications:  Scheduled Meds:   metoprolol tartrate  12.5 mg Oral BID    bupivacaine  30 mL IntraDERmal Once    amiodarone  200 mg Oral Daily    calcium-cholecalciferol  1 tablet Oral Daily    lidocaine  2 patch Topical Daily    traZODone  50 mg Oral Nightly    cetirizine  5 mg Oral Daily    predniSONE  1 mg Oral Daily    sodium chloride flush  5-40 mL IntraVENous 2 times per day    insulin lispro  0-6 Units SubCUTAneous TID WC    insulin lispro  0-3 Units SubCUTAneous Nightly    miconazole   Topical BID    levETIRAcetam  500 mg Oral BID    isosorbide mononitrate  30 mg Oral Daily    docusate sodium  100 mg Oral BID    levothyroxine  100 mcg Oral Daily    pantoprazole  40 mg Oral QAM AC    [Held by provider] aspirin  81 mg Oral Daily    sertraline  50 mg Oral BID    atorvastatin  10 mg Oral Nightly    [Held by provider] enoxaparin  40 mg SubCUTAneous Daily     Continuous Infusions:   sodium chloride 75 mL/hr at Please advise.    05/18/22 0335    sodium chloride      dextrose       PRN Meds:albuterol, potassium chloride **OR** potassium alternative oral replacement **OR** potassium chloride, ALPRAZolam, sodium chloride flush, sodium chloride, magnesium sulfate, glucose, dextrose bolus **OR** dextrose bolus, glucagon (rDNA), dextrose, albuterol, fentanNYL, fentanNYL, hydrALAZINE, HYDROcodone 5 mg - acetaminophen **OR** HYDROcodone 5 mg - acetaminophen, phenol, melatonin, polyethylene glycol, ondansetron **OR** ondansetron    Objective: Patient was observed ambulating with the assistance of physical therapy before returning to her bed to undergo bedside procedure involving aspiration of fluid accumulation from beneath the surgical incision site. She tolerated removal of less than 60 mL resulting in a flat dry incision. Incision site was redressed with Kerlix and stockinette. She remains otherwise stable and neurologically intact her baseline on exam this morning with no additional significant changes noted. Vitals: BP (!) 169/52   Pulse 68   Temp 98.2 °F (36.8 °C) (Oral)   Resp 18   Ht 5' (1.524 m)   Wt 151 lb 6.4 oz (68.7 kg)   LMP  (LMP Unknown) Comment: age 52 ovaries intact  SpO2 100%   BMI 29.57 kg/m²     Physical Exam     Physical Exam:  Alert and attentive. Language appropriate, with no aphasia. Pupils equal.  Facial strength symmetric. Review of Systems     Constitutional: Negative for activity change. Respiratory: Negative for apnea, chest tightness and shortness of breath.    Cardiovascular: Negative for chest pain. Musculoskeletal: Positive for neck pain.        Some mild neck pain is noted along with tenderness and pressure sensation secondary to fluid accumulation beneath the incision site   Neurological: Negative for dizziness and speech difficulty. Psychiatric/Behavioral: Positive for confusion.  Negative for agitation, behavioral problems and decreased concentration.        Mild confusion per corresponds to expansile T2/FLAIR signal seen on previous MRI. There is stable mass effect on the right occipital horn. No midline shift is identified. Ventricular size is stable. 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 Orbits are otherwise unremarkable. Paranasal sinuses and mastoid air cells are clear. Raymond CT of the head for surgical planning redemonstrating right parietal craniotomy with complex cystic mass subjacent involving the prior resection cavity with adjacent mild expansile hypodensity, better characterized on previous MRI and causing stable mild mass effect on the occipital horn of the right lateral ventricle. **This report has been created using voice recognition software. It may contain minor errors which are inherent in voice recognition technology. ** Final report electronically signed by Dr. Josse Faye MD on 5/10/2022 10:35 AM                   Assessment:Status postoperative day #7 from reexploration revision of craniotomy for resection of recurrent lesion performed by Dr. Jonnathan Velásquez, without complication    Principal Problem:    Brain mass  Active Problems:    Brain metastases ([address]  Resolved Problems:    * No resolved hospital problems. *        Plan: Patient is seen and evaluated on the floor with evaluation exam findings reviewed discussed with Dr. Jonnathan Velásquez with nursing. Patient was resting comfortably again this morning with pain moderately controlled. Some recurrence of fluid collection and fluctuance beneath the incision site was noted again this morning with approximately 60 mL removed at bedside. She tolerated the procedure with a flat dry incision remaining. A snug and secure Curlex dressing and stockinette were applied. On exam she remained stable and intact neurologically to her baseline with no additional significant changes noted and was absent fever, headache or visual changes or significant deficits.   Neurosurgery continues to recommend discharge planning with a follow-up with neurosurgery for suture removal at 2 weeks postop.       Electronically signed by Mckenzie Miguel PA-C on 5/18/2022 at 12:48 PM    Neurosurgery

## 2024-04-24 NOTE — CARE COORDINATION
appointment?: Yes  How are you going to get to your appointment?: Car - family or friend to transport  Post Acute Services: [address] (Comment: [facility])  Patient DME: Angela May, Wheelchair  Do you have support at home?: Alone  Do you feel like you have everything you need to keep you well at home?: Yes  Are you an active caregiver in your home?: No  Care Transitions Interventions     Transportation Support: Declined        Transitions of Care Initial Call    Challenges to be reviewed by the provider   Additional needs identified to be addressed with provider: Yes  DAO Carrie Harrisonodilon Spoke to niarianna, Daphne Fernandes, said Sara Novoa was doing well yesterday but after taking her bedtime dose of Decadron, she has been up off and on all night vomiting. [facility] nurse told her to give her Phenergan and is now sleeping sound since 9am this morning. Denies fever, has appt /9. Method of communication with provider : chart routing    Advance Care Planning:   Does patient have an Advance Directive: reviewed and current. Care Transition Nurse contacted the family by telephone to perform post hospital discharge assessment. Verified name and  with family as identifiers. Provided introduction to self, and explanation of the CTN role. CTN reviewed discharge instructions, medical action plan and red flags with family who verbalized understanding. Family given an opportunity to ask questions and does not have any further questions or concerns at this time. Were discharge instructions available to patient? Yes. Reviewed appropriate site of care based on symptoms and resources available to patient including: PCP  Specialist  Home health. The family agrees to contact the PCP office for questions related to their healthcare. Medication reconciliation was performed with family, who verbalizes understanding of administration of home medications. Advised obtaining a 90-day supply of all daily and as-needed medications.      Was patient discharged with a pulse oximeter? no    CTN provided contact information. Plan for follow-up call in 1-2 days based on severity of symptoms and risk factors. Plan for next call: symptom management-vomiting?       Follow Up  Future Appointments   Date Time Provider Irene Shah   8/8/2022  2:45 PM Amber [facility] SIM STRZ [facility]EF[facility]   8/9/2022  1:50 PM Jimenez Marquis MD [facility] [place]   8/18/2022  4:00 PM STR MRI RM1 STRZ MRI STR Radiolog   8/23/2022  1:30 PM Sebastien Rios, APRN - CNP N [facility]PX[facility]UR[facility]   10/11/2022  1:30 PM Jimenez Marquis MD [facility] [place]       Joekyleeahmet Gonzalez, [facility] no

## 2024-07-17 NOTE — PLAN OF CARE
Problem: Potential for Falls  Goal: Patient will remain free of falls  Description: INTERVENTIONS:  - Educate patient/family on patient safety including physical limitations  - Instruct patient to call for assistance with activity   - Consult OT/PT to assist with strengthening/mobility   - Keep Call bell within reach  - Keep bed low and locked with side rails adjusted as appropriate  - Keep care items and personal belongings within reach  - Initiate and maintain comfort rounds  - Make Fall Risk Sign visible to staff  - Offer Toileting every 2 Hours, in advance of need  - Initiate/Maintain bed alarm  - Obtain necessary fall risk management equipment: alarms, socks  - Apply yellow socks and bracelet for high fall risk patients  - Consider moving patient to room near nurses station  Outcome: Progressing     Problem: PAIN - ADULT  Goal: Verbalizes/displays adequate comfort level or baseline comfort level  Description: Interventions:  - Encourage patient to monitor pain and request assistance  - Assess pain using appropriate pain scale  - Administer analgesics based on type and severity of pain and evaluate response  - Implement non-pharmacological measures as appropriate and evaluate response  - Consider cultural and social influences on pain and pain management  - Notify physician/advanced practitioner if interventions unsuccessful or patient reports new pain  Outcome: Progressing     Problem: INFECTION - ADULT  Goal: Absence or prevention of progression during hospitalization  Description: INTERVENTIONS:  - Assess and monitor for signs and symptoms of infection  - Monitor lab/diagnostic results  - Monitor all insertion sites, i.e. indwelling lines, tubes, and drains  - Monitor endotracheal if appropriate and nasal secretions for changes in amount and color  - [place] appropriate cooling/warming therapies per order  - Administer medications as ordered  - Instruct and encourage patient and family to use good hand  Problem: Discharge Planning  Goal: Discharge to home or other facility with appropriate resources  Description: Discharge planning in progress. Outcome: Progressing  Note: Discharge planning in process and discussed with patient/family. Social work consulted for any additional needs. Care manager aware of discharge needs.         Problem: Pain  Goal: Verbalizes/displays adequate comfort level or baseline comfort level  Description: PRN Norco for pain management. Encouraged to report pain on pain scale. Outcome: Progressing  Note: Patient able to use 0-10 pain scale. Denies pain at this time. C/O burning sensation at incision site. Agreeable to take PRN pain medications.        Problem: Safety - Adult  Goal: Free from fall injury  Description: Family at bedside. Bed alarm activated. Outcome: Progressing  Note: Patient remained free from falls this shift. Bed is in low position with alarm on and siderails up x2. Education given on use of call light and patient voiced understanding. Call light and beside table within reach. Arm band and falling star in place. Hourly rounds completed. Will continue to monitor.         Problem: Skin/Tissue Integrity  Goal: Absence of new skin breakdown  Description: 1. Monitor for areas of redness and/or skin breakdown  2. Assess vascular access sites hourly  3. Every 4-6 hours minimum:  Change oxygen saturation probe site  4. Every 4-6 hours:  If on nasal continuous positive airway pressure, respiratory therapy assess nares and determine need for appliance change or resting period. Outcome: Progressing  Note: No signs of skin breakdown. Skin warm, dry, and intact. Mucous membranes pink and moist. Pt has incision site closed with sutures on head open to air. Assistance with turns/ambulation provided PRN. Will continue to monitor.      Care plan reviewed with patient.   Patient verbalizes understanding of the plan of care and contributed to goal setting.      hygiene technique  - Identify and instruct in appropriate isolation precautions for identified infection/condition  Outcome: Progressing     Problem: DISCHARGE PLANNING  Goal: Discharge to home or other facility with appropriate resources  Description: INTERVENTIONS:  - Identify barriers to discharge w/patient and caregiver  - Arrange for needed discharge resources and transportation as appropriate  - Identify discharge learning needs (meds, wound care, etc.)  - Arrange for interpretive services to assist at discharge as needed  - Refer to Case Management Department for coordinating discharge planning if the patient needs post-hospital services based on physician/advanced practitioner order or complex needs related to functional status, cognitive ability, or social support system  Outcome: Progressing     Problem: Knowledge Deficit  Goal: Patient/family/caregiver demonstrates understanding of disease process, treatment plan, medications, and discharge instructions  Description: Complete learning assessment and assess knowledge base.  Interventions:  - Provide teaching at level of understanding  - Provide teaching via preferred learning methods  Outcome: Progressing     Problem: RESPIRATORY - ADULT  Goal: Achieves optimal ventilation and oxygenation  Description: INTERVENTIONS:  - Assess for changes in respiratory status  - Assess for changes in mentation and behavior  - Position to facilitate oxygenation and minimize respiratory effort  - Oxygen administered by appropriate delivery if ordered  - Initiate smoking cessation education as indicated  - Encourage broncho-pulmonary hygiene including cough, deep breathe, Incentive Spirometry  - Assess the need for suctioning and aspirate as needed  - Assess and instruct to report SOB or any respiratory difficulty  - Respiratory Therapy support as indicated  Outcome: Progressing
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
## 2025-04-14 NOTE — PROCEDURES
Bladder Scan showed >561 ml. \[facility] [place] [facility]\"" RN notified. Please see the attached refill request.

## (undated) DEVICE — COVER ARMBRD W13XL28.5IN IMPERV BLU FOR OP RM

## (undated) DEVICE — SCALPFIX STERILE: Brand: AESCULAP

## (undated) DEVICE — AGENT HEMSTAT W2XL14IN OXIDIZED REGENERATED CELOS ABSRB FOR

## (undated) DEVICE — TOWEL,OR,DSP,ST,BLUE,DLX,4/PK,20PK/CS: Brand: MEDLINE

## (undated) DEVICE — CATHETER IV 14GA L1.25IN TEF STR HUB INTROCAN SFTY

## (undated) DEVICE — TTL1LYR 16FR10ML 100%SIL TMPST TR: Brand: MEDLINE

## (undated) DEVICE — SEALANT TISS 10 CC FIBRIN VISTASEAL

## (undated) DEVICE — DISPOSABLE SLIM BIPOLAR FORCEPS, NON-STICK,: Brand: SPETZLER-MALIS

## (undated) DEVICE — GLOVE ORANGE PI 7   MSG9070

## (undated) DEVICE — BLANKET THER AD W24XL60IN FAB COVERING SUP SFT ULT THN LTWT

## (undated) DEVICE — 450 ML BOTTLE OF 0.05% CHLORHEXIDINE GLUCONATE IN 99.95% STERILE WATER FOR IRRIGATION, USP AND APPLICATOR.: Brand: IRRISEPT ANTIMICROBIAL WOUND LAVAGE

## (undated) DEVICE — BINDER ABD UNISX 4 PNL PREM 6INX6INX12IN L XL 4

## (undated) DEVICE — AGENT HEMOSTATIC SURGIFLOW MATRIX KIT W/THROMBIN

## (undated) DEVICE — SUTURE VCRL + SZ 2-0 L18IN ABSRB VLT L26MM SH 1/2 CIR VCP775D

## (undated) DEVICE — CATHETER IV 14GA L1.75IN OD2.146MM ID1.740MM ORNG VIALON

## (undated) DEVICE — GOWN,SIRUS,NON REINFRCD,LARGE,SET IN SL: Brand: MEDLINE

## (undated) DEVICE — GOWN,SIRUS,NONRNF,SETINSLV,XL,20/CS: Brand: MEDLINE

## (undated) DEVICE — COVER,MAYO STAND,STERILE: Brand: MEDLINE

## (undated) DEVICE — TOTAL TRAY, DB, 100% SILI FOLEY, 16FR 10: Brand: MEDLINE

## (undated) DEVICE — WAX SURG 2.5GM HEMSTAT BNE BEESWAX PARAFFIN ISO PALMITATE

## (undated) DEVICE — TUBING, SUCTION, 1/4" X 20', STRAIGHT: Brand: MEDLINE INDUSTRIES, INC.

## (undated) DEVICE — APPLICATOR PREP 26ML 0.7% IOD POVACRYLEX 74% ISO ALC ST

## (undated) DEVICE — MICRO TIP WIPE: Brand: DEVON

## (undated) DEVICE — EVACUATOR SURG 100CC SIL BLB SUCT RESVR FOR CLS WND DRNGE

## (undated) DEVICE — SUTURE NRLN SZ 4-0 L18IN NONABSORBABLE BLK L17MM RB-1 1/2 C554D

## (undated) DEVICE — PAD,ABDOMINAL,5"X9",ST,LF,25/BX: Brand: MEDLINE INDUSTRIES, INC.

## (undated) DEVICE — AGENT HEMSTAT W2XL4IN OXIDIZED REGENERATED CELOS ABSRB SFT

## (undated) DEVICE — PAD,SANITARY,11 IN,MAXI,W/WINGS,N-STRL: Brand: MEDLINE

## (undated) DEVICE — SUTURE V-LOC 180 SZ 3-0 L9IN ABSRB GRN L26MM V-20 1/2 CIR VLOCL0644

## (undated) DEVICE — CODMAN® DISPOSABLE PERFORATOR 14MM: Brand: CODMAN®

## (undated) DEVICE — SUTURE VCRL SZ 2-0 L18IN ABSRB VLT L26MM SH 1/2 CIR J775D

## (undated) DEVICE — 3M™ IOBAN™ 2 ANTIMICROBIAL INCISE DRAPE 6650EZ: Brand: IOBAN™ 2

## (undated) DEVICE — APPLIER CLP L L13IN TI MULT RNG HNDL 20 CLP STR LIGACLP

## (undated) DEVICE — PENCIL SMK EVAC ALL IN 1 DSGN ENH VISIBILITY IMPROVED AIR

## (undated) DEVICE — PACK-MAJOR

## (undated) DEVICE — DIFFUSER: Brand: CORE, MAESTRO

## (undated) DEVICE — BASIC SINGLE BASIN BTC-LF: Brand: MEDLINE INDUSTRIES, INC.

## (undated) DEVICE — CODMAN® SURGICAL PATTIES 1/2" X 1/2" (1.27CM X 1.27CM): Brand: CODMAN®

## (undated) DEVICE — GLOVE ORANGE PI 8   MSG9080

## (undated) DEVICE — MARKER,SKIN,WI/RULER AND LABELS: Brand: MEDLINE

## (undated) DEVICE — CRANI PACK: Brand: MEDLINE INDUSTRIES, INC.

## (undated) DEVICE — SYRINGE IRRIG 60ML SFT PLIABLE BLB EZ TO GRP 1 HND USE W/

## (undated) DEVICE — SOLUTION IV 1000ML LAC RINGERS PH 6.5 INJ USP VIAFLX PLAS

## (undated) DEVICE — COVER MICSCP W46XL120IN 4 BINOC GLS LENS LEICA

## (undated) DEVICE — CLIP LIG M TI 6 SIL HNDL FOR OPN AND ENDOSCP SGL APPL

## (undated) DEVICE — SPONGE LAP W18XL18IN WHT COT 4 PLY FLD STRUNG RADPQ DISP ST

## (undated) DEVICE — RESERVOIR,SUCTION,100CC,SILICONE: Brand: MEDLINE

## (undated) DEVICE — Z DISCONTINUED NO SUB IDED CONNECTOR SURG HEMOSTATIC OPN FLD FOR BIOMATERIAL

## (undated) DEVICE — 35 ML SYRINGE LUER-LOCK TIP: Brand: MONOJECT

## (undated) DEVICE — 3M™ STERI-DRAPE™ INSTRUMENT POUCH 1018: Brand: STERI-DRAPE™

## (undated) DEVICE — BATTERY DRVR FOR INTELLIGENT SYS

## (undated) DEVICE — TOWEL,OR,DSP,ST,BLUE,STD,4/PK,20PK/CS: Brand: MEDLINE

## (undated) DEVICE — YANKAUER,BULB TIP,W/O VENT,RIGID,STERILE: Brand: MEDLINE

## (undated) DEVICE — CODMAN® SURGICAL PATTIES 1/2" X1 1/2" (1.27CM X 3.81CM): Brand: CODMAN®

## (undated) DEVICE — DRAIN,WOUND,15FR,3/16,FULL-FLUTED: Brand: MEDLINE

## (undated) DEVICE — BAG,BANDED,W/RUBBERBAND,STERILE,30X36: Brand: MEDLINE

## (undated) DEVICE — Z DISCONTINUED USE 2218995 STAPLER INT L26CM STPL 29MM OPN INTLUMN CRV CIR ADJ HT

## (undated) DEVICE — Device

## (undated) DEVICE — CLIP LIG SM TI 6 BLU HNDL FOR OPN AND ENDOSCP SGL APPL

## (undated) DEVICE — SPONGE GZ W4XL4IN COT 12 PLY TYP VII WVN C FLD DSGN

## (undated) DEVICE — DRAPE GYN LAPSCP UROLOGY CLR DISP STRL OCVD CLRVIEWDRP

## (undated) DEVICE — PREP SOL PVP IODINE 4%  4 OZ/BTL

## (undated) DEVICE — OIL CARTRIDGE: Brand: CORE, MAESTRO

## (undated) DEVICE — SYRINGE CATH TIP 50ML

## (undated) DEVICE — SUTURE PDS II 3 0 L18IN ABSRB VLT SH 26MM 1 2 CIR TAPERPOINT VC774D

## (undated) DEVICE — 2.3MM TAPERED ROUTER

## (undated) DEVICE — 5.0MM ROUND FLUTED AGGRESSIVE

## (undated) DEVICE — SUTURE V-LOC 180 SZ 2-0 L9IN ABSRB VLT GS-21 L37MM 1/2 CIR VLOCM0345

## (undated) DEVICE — 1.1MM X 6.0MM STRAIGHT ROUTER

## (undated) DEVICE — CARBIDE MATCH HEAD

## (undated) DEVICE — ROYAL SILK SURGICAL GOWN, XXL: Brand: CONVERTORS

## (undated) DEVICE — 1010 S-DRAPE TOWEL DRAPE 10/BX: Brand: STERI-DRAPE™

## (undated) DEVICE — STAPLER INT STPL LN H1.8-4.8XL60MM THCK TISS 2 ROW 8 FIRING

## (undated) DEVICE — AGENT HEMSTAT W3XL4IN OXIDIZED REGENERATED CELOS ABSRB FOR

## (undated) DEVICE — GLOVE SURG SZ 85 L12IN THK75MIL DK GRN LTX FREE

## (undated) DEVICE — BREAST HERNIA PACK: Brand: MEDLINE INDUSTRIES, INC.

## (undated) DEVICE — SUTURE VCRL SZ 2-0 L27IN ABSRB UD L26MM SH 1/2 CIR J417H

## (undated) DEVICE — GLOVE ORANGE PI 8 1/2   MSG9085

## (undated) DEVICE — 3M™ TRANSPORE™ WHITE SURGICAL TAPE 1534-2, 2 INCH X 10 YARD (5CM X 9,1M), 6 ROLLS/CARTON 10 CARTONS/CASE: Brand: 3M™ TRANSPORE™

## (undated) DEVICE — DRAIN SURG FLAT W7MMXL20CM FULL PERF

## (undated) DEVICE — SUTURE VCRL SZ 0 L18IN ABSRB VLT SUTUPAK PRECUT W/O NDL J106T

## (undated) DEVICE — INSTRUMENT BATTERY

## (undated) DEVICE — 3M™ WARMING BLANKET, UPPER BODY, 10 PER CASE, 42268: Brand: BAIR HUGGER™

## (undated) DEVICE — PACK PROCEDURE SURG SET UP SRMC

## (undated) DEVICE — TIDISHIELD SURGICAL PATIENT DRAPE WITH INVASIVE APERTURES BLUE STERILE UNIVERSAL NAVIGATIONAL CRANIOTOMY 8 PER CASE: Brand: TIDISHIELD

## (undated) DEVICE — DISPOSABLE STANDARD BIPOLAR FORCEPS, NON-STICK,: Brand: SPETZLER-MALIS

## (undated) DEVICE — STAPLER INT L55MM CUT LN L53MM STPL LN L57MM BLU B FRM 8

## (undated) DEVICE — YANKAUER,POOLE TIP,STERILE,50/CS: Brand: MEDLINE

## (undated) DEVICE — STRAIGHT TIP, UNIVERSAL

## (undated) DEVICE — SUTURE ETHLN SZ 3-0 L18IN NONABSORBABLE BLK FS-1 L24MM 3/8 663H

## (undated) DEVICE — PACK CRANIOPLASTY RESINOUS 75PPM BONE SUB FOR REPAIRING CRAN

## (undated) DEVICE — SHEET, T, LAPAROTOMY, STERILE: Brand: MEDLINE

## (undated) DEVICE — APPLIER LIG CLP M L11IN TI STR RNG HNDL FOR 20 CLP DISP

## (undated) DEVICE — SUTURE ETHLN SZ 2-0 L30IN NONABSORBABLE BLK L36MM FSLX 3/8 1674H